# Patient Record
Sex: FEMALE | Race: WHITE | Employment: OTHER | URBAN - METROPOLITAN AREA
[De-identification: names, ages, dates, MRNs, and addresses within clinical notes are randomized per-mention and may not be internally consistent; named-entity substitution may affect disease eponyms.]

---

## 2017-01-02 ENCOUNTER — TELEPHONE (OUTPATIENT)
Dept: FAMILY MEDICINE | Facility: CLINIC | Age: 82
End: 2017-01-02

## 2017-01-02 DIAGNOSIS — K59.09 CONSTIPATION, CHRONIC: Primary | ICD-10-CM

## 2017-01-02 RX ORDER — AMOXICILLIN 250 MG
2 CAPSULE ORAL AT BEDTIME
Qty: 120 TABLET | Refills: 11 | Status: SHIPPED
Start: 2017-01-02 | End: 2018-11-28

## 2017-01-02 NOTE — TELEPHONE ENCOUNTER
Dok Plus 50/8.6mg      Last Written Prescription Date:  10/15/2015  Last Fill Quantity: 100,   # refills: 0  Last Office Visit with [facility], [facility] or [facility] prescribing provider: 12/22/2016  Future Office visit:    Next 5 appointments (look out 90 days)     Jan 05, 2017  9:40 AM   Nurse Only with [facility]   [facility]    [address] [place] [id number] [phone]                   Routing refill request to provider for review/approval because:  Drug not active on patient's medication list    Thank you,    Bravo Hughes CPhT  [facility]  P. [phone]

## 2017-01-05 ENCOUNTER — ALLIED HEALTH/NURSE VISIT (OUTPATIENT)
Dept: NURSING | Facility: CLINIC | Age: 82
End: 2017-01-05
Payer: MEDICARE

## 2017-01-05 ENCOUNTER — CARE COORDINATION (OUTPATIENT)
Dept: CARE COORDINATION | Facility: CLINIC | Age: 82
End: 2017-01-05

## 2017-01-05 DIAGNOSIS — I48.0 PAROXYSMAL ATRIAL FIBRILLATION (H): ICD-10-CM

## 2017-01-05 DIAGNOSIS — R42 DIZZINESS: Primary | ICD-10-CM

## 2017-01-05 PROCEDURE — 99207 ZZC NO CHARGE NURSE ONLY: CPT

## 2017-01-05 PROCEDURE — 93272 ECG/REVIEW INTERPRET ONLY: CPT | Performed by: INTERNAL MEDICINE

## 2017-01-05 PROCEDURE — 93225 XTRNL ECG REC<48 HRS REC: CPT | Performed by: INTERNAL MEDICINE

## 2017-01-05 PROCEDURE — 93227 XTRNL ECG REC<48 HR R&I: CPT | Performed by: INTERNAL MEDICINE

## 2017-01-05 NOTE — PROGRESS NOTES
Per Dr. Lockett, patient came in and had 48 hour of a 48 hour holter put on. Patient instructed to return on 1/9/2017 and have tape and battery changed, no diary was given. Patient was instructed to use the sensor and phone.  Patient to return on 1/9/2017 and have monitor removed.   All questions the patient had has been answered before she was discharged.  Glenna Marquez

## 2017-01-05 NOTE — PROGRESS NOTES
Clinic Care Coordination Contact  D/I: Received call from this patient asking about status of refill for Aldactone. Discussed her regular CC COLLEEN and that another RN CC has been assigned. She stated she had been given my number. Communicated that I would be happy to help her, but that she may be receiving a call from another CC so as not to get confused. Stated she understood this. As we were speaking, she realized that the refill she was looking for needed to go through Dr. Loza. Per chart review, his office has received request. She has Dr. Loza's RN number and will call her. Advised her to call this CC back if having difficulty. Stated she would.  P: Patient will contact Dr. Loza's office. Will notify RN CC of further needs.       RN CC will follow up as scheduled.    John SANDERS,RN- BC  Clinic Care Coordinator  [facility]  Phone: [phone]

## 2017-01-06 ENCOUNTER — INFUSION THERAPY VISIT (OUTPATIENT)
Dept: INFUSION THERAPY | Facility: CLINIC | Age: 82
End: 2017-01-06
Payer: MEDICARE

## 2017-01-06 VITALS
OXYGEN SATURATION: 100 % | SYSTOLIC BLOOD PRESSURE: 126 MMHG | RESPIRATION RATE: 18 BRPM | WEIGHT: 160.6 LBS | HEART RATE: 61 BPM | DIASTOLIC BLOOD PRESSURE: 58 MMHG | BODY MASS INDEX: 28.46 KG/M2 | TEMPERATURE: 96.5 F

## 2017-01-06 DIAGNOSIS — M06.9 RHEUMATOID ARTHRITIS INVOLVING MULTIPLE SITES, UNSPECIFIED RHEUMATOID FACTOR PRESENCE: Primary | ICD-10-CM

## 2017-01-06 PROCEDURE — 96401 CHEMO ANTI-NEOPL SQ/IM: CPT | Performed by: INTERNAL MEDICINE

## 2017-01-06 PROCEDURE — 99207 ZZC NO CHARGE LOS: CPT

## 2017-01-06 NOTE — PROGRESS NOTES
Infusion Nursing Note:  Linda Spicer presents today for Cimzia.    Patient seen by provider today: No    Note: Linda is feeling quite well lately.  She is happy to be feeling this good.      Intravenous Access:  No Intravenous access/labs at this visit.    Treatment Conditions:  N/A    Post Infusion Assessment:  Patient tolerated injection without incident.  Site patent and intact, free from redness, edema or discomfort.    Discharge Plan:   Copy of AVS reviewed with patient and/or family.  Patient will return 2/3/17 for next appointment.  Patient discharged in stable condition accompanied by: daughter.  Departure Mode: Ambulatory.    Isidra Rojas RN

## 2017-01-10 NOTE — NURSING NOTE
Patient returned Holter to the clinic 1/8/2016. Holter was mailed to life watched.  Glenna Marquez

## 2017-01-11 DIAGNOSIS — R42 DIZZINESS: ICD-10-CM

## 2017-01-11 DIAGNOSIS — I48.0 PAROXYSMAL ATRIAL FIBRILLATION (H): ICD-10-CM

## 2017-01-13 ENCOUNTER — CARE COORDINATION (OUTPATIENT)
Dept: CARE COORDINATION | Facility: CLINIC | Age: 82
End: 2017-01-13

## 2017-01-13 ENCOUNTER — TELEPHONE (OUTPATIENT)
Dept: CARDIOLOGY | Facility: CLINIC | Age: 82
End: 2017-01-13

## 2017-01-13 PROBLEM — Z76.89 HEALTH CARE HOME: Status: ACTIVE | Noted: 2017-01-13

## 2017-01-13 NOTE — TELEPHONE ENCOUNTER
Patient called and left a message stating that she sent in her paperwork to [employer]avis Guzman for her medication and she has some questions on it. Please give her a call back. Lizett Arguelles CMA ([facility])

## 2017-01-13 NOTE — TELEPHONE ENCOUNTER
Spoke with patient.  Needs forms filled out to help with financial assistance with the Eliquis. Will email forms to us and she asked if the forms could be sent back to the company when completed.

## 2017-01-13 NOTE — PROGRESS NOTES
"Clinic Care Coordination Contact  Care Team Conversations  D/I: This RN CC received incoming call from patient. She was wondering about her Holter Monitor results. Reviewed chart, and it appears that result has been sent to PCP for review. This RN CC checked report, it appears that nothing abnormal was found. Told patient it appears to be normal but that her PCP or cardiologist office should be contacting her with results. She then brought up an issue she had last night. States she got up in the night and had a strange feeling in the back of her palette. States it felt stretched and she had a choking- like sensation.States it is sore in that area today. States she has not had this before, wondering if normal. Advised that it is not something that most people experience but that she should continue to monitor for this and if she has more of these episodes or if soreness does not go away she should see PCP. Patient will continue to monitor. She states \"I know that if it is not happening at the moment they probably won't know what it is.\"  P: Patient will monitor/report further episodes. Will make MD appt. As needed.            RN CC will continue to follow and assist as needed with CC.    John SANDERS,RN- BC  Clinic Care Coordinator  [facility]  Phone: [phone]                   "

## 2017-01-16 DIAGNOSIS — K21.00 REFLUX ESOPHAGITIS: Primary | ICD-10-CM

## 2017-01-16 NOTE — TELEPHONE ENCOUNTER
omeprazole (PRILOSEC) 20 MG capsule      Last Written Prescription Date: 1/11/16  Last Fill Quantity: 90,  # refills: 3   Last Office Visit with [facility], [facility] or [facility] prescribing provider: 12/22/16

## 2017-01-18 NOTE — TELEPHONE ENCOUNTER
Prescription approved per [facility] Refill Protocol.  ANJELICA Jaramillo, Clinical RN [facility].

## 2017-01-26 ENCOUNTER — CARE COORDINATION (OUTPATIENT)
Dept: CARE COORDINATION | Facility: CLINIC | Age: 82
End: 2017-01-26

## 2017-01-26 NOTE — PROGRESS NOTES
Clinic Care Coordination Contact  Care Team Conversations  D/I: Received call from patient for advice about pain in the roof of her mouth. States it feels like she burned the roof of her mouth but to her knowledge she has not. She had called this RN CC 1 week ago stating that she awoke in the night with a pain/oressure sensation in the back of her mouth/throat area (states it was her palette). I asked if it was that same pain. She said no, that particular pain went away and this is something new. States it started a day or two ago and that it is worse today. She is wondering if a medication could be causing this. Advised this is possible but difficult to know what is causing it without seeing her mouth. States she cannot see it herself as it is toward the back of her mouth. She has no other symptoms. There is nobody with her who can look at moth for redness or white patches. Advise to swish and spit warm salt water for comfort. States she has to be on a low salt diet. Discussed not swallowing, just swishing and spitting. States she understands. She feels like she should be seen because itis getting worse. She plans to check her daughter's availability to bring her in tomorrow. She will have daughter call scheduling line if she plans to come in.  P: Patient will decide if she needs to be seen, Will try warm water swishes for comfort. Will notify RN CC of further needs.       RN CC will F/U with patient as planned.    John SANDERS,RN- BC  Clinic Care Coordinator  [facility]  Phone: [phone]

## 2017-01-27 ENCOUNTER — TELEPHONE (OUTPATIENT)
Dept: FAMILY MEDICINE | Facility: CLINIC | Age: 82
End: 2017-01-27

## 2017-01-27 ENCOUNTER — TELEPHONE (OUTPATIENT)
Dept: NURSING | Facility: CLINIC | Age: 82
End: 2017-01-27

## 2017-01-27 ENCOUNTER — TELEPHONE (OUTPATIENT)
Dept: RHEUMATOLOGY | Facility: CLINIC | Age: 82
End: 2017-01-27

## 2017-01-27 ENCOUNTER — OFFICE VISIT (OUTPATIENT)
Dept: FAMILY MEDICINE | Facility: CLINIC | Age: 82
End: 2017-01-27
Payer: MEDICARE

## 2017-01-27 VITALS
BODY MASS INDEX: 28.17 KG/M2 | HEART RATE: 66 BPM | DIASTOLIC BLOOD PRESSURE: 58 MMHG | SYSTOLIC BLOOD PRESSURE: 118 MMHG | OXYGEN SATURATION: 99 % | TEMPERATURE: 98.8 F | WEIGHT: 159 LBS | HEIGHT: 63 IN

## 2017-01-27 DIAGNOSIS — Z86.79 H/O ATRIAL FLUTTER: Primary | ICD-10-CM

## 2017-01-27 DIAGNOSIS — K12.0 ORAL APHTHAE: Primary | ICD-10-CM

## 2017-01-27 DIAGNOSIS — Z29.89 NEED FOR PROPHYLAXIS AGAINST URINARY TRACT INFECTION: ICD-10-CM

## 2017-01-27 DIAGNOSIS — I48.92 ATRIAL FLUTTER, UNSPECIFIED TYPE (H): ICD-10-CM

## 2017-01-27 DIAGNOSIS — Z87.440 PERSONAL HISTORY OF URINARY TRACT INFECTION: ICD-10-CM

## 2017-01-27 PROCEDURE — 99214 OFFICE O/P EST MOD 30 MIN: CPT | Performed by: INTERNAL MEDICINE

## 2017-01-27 RX ORDER — CHLORHEXIDINE GLUCONATE ORAL RINSE 1.2 MG/ML
15 SOLUTION DENTAL DAILY
COMMUNITY
End: 2017-02-16

## 2017-01-27 RX ORDER — FOLIC ACID 1 MG/1
1 TABLET ORAL DAILY
Qty: 100 TABLET | Refills: 3 | Status: SHIPPED | OUTPATIENT
Start: 2017-01-27 | End: 2017-05-12

## 2017-01-27 RX ORDER — CIPROFLOXACIN 250 MG/1
250 TABLET, FILM COATED ORAL DAILY
Qty: 90 TABLET | Refills: 3 | Status: SHIPPED | OUTPATIENT
Start: 2017-01-27 | End: 2017-02-16

## 2017-01-27 RX ORDER — CHLORHEXIDINE GLUCONATE ORAL RINSE 1.2 MG/ML
15 SOLUTION DENTAL DAILY
Qty: 473 ML | Refills: 11 | Status: SHIPPED | OUTPATIENT
Start: 2017-01-27 | End: 2017-02-16

## 2017-01-27 NOTE — MR AVS SNAPSHOT
After Visit Summary   1/27/2017    Linda Spicer    MRN: [id number]           Patient Information     Date Of Birth          6/13/1931        Visit Information        Provider Department      1/27/2017 8:00 AM Tre Lockett MD [facility]        Today's Diagnoses     Oral aphthae    -  1     Atrial flutter, unspecified type (H)         Personal history of urinary tract infection         Need for prophylaxis against urinary tract infection           Care Instructions    This summary includes the important diagnoses, test, medications and other important parts of your medical history.  Below are a few good we sites you can use to learn more about these.     Www.[place]a Village.OpenROV : Up to date and easily searchable information on multiple topics.  Www.Tribal Nova/Pharmacy/c_539084.asp : [facility] $4.99 medications  Www.Critical Media.gov : medication info, interactive tutorials, watch real surgeries online  Www.familydoctor.org : good info from the Academy of Family Physicians  Www.[employer].PROV[employer]US PH[employer]ACE[employer]ICALS : good info from the [facility]  Www.cdc.gov : public health info, travel advisories, epidemics (H1N1)  Www.aap.org : children's health info, normal development, vaccinations  Www.health.[facility].[place].us : [place] dept of heatlh, public health issues in [place], N1N1    Based on your medical history and these are the current health maintenance or preventive care services that you are due for (some may have been done at this visit:)  There are no preventive care reminders to display for this patient.  =================================================================================  Normal Values   Blood pressure  <140/90 for most adults    <130/80 for some chronic diseases (ask your care team about yours)    BMI (body mass index)  18.5-25 kg/m2 (based on height and weight)     Thank you for visiting [facility]    Normal or non-critical lab and imaging results will be  communicated to you by MyChart, letter or phone within 7 days.  If you do not hear from us within 10 days, please call the clinic. If you have a critical or abnormal lab result, we will notify you by phone as soon as possible.     If you have any questions regarding your visit please contact:     Team Comfort:   Clinic Hours Telephone Number   Dr. Aravind Novak   7am-5pm  Monday - Friday [phone]  Viktor RN   Pharmacy 8am-8pm Monday-Thursday      8am-6pm Friday  9am-5pm Saturday-Sunday [phone]   Urgent Care 11am-8pm Monday-Friday        9am-5pm Saturday-Sunday [phone]     After hours, weekend or if you need to make an appointment with your primary provider please call [phone].   After Hours nurse advise: call [facility]: [phone]    Medication Refills:  Call your pharmacy and they will forward the refill to us. Please allow 3 business days for your refills to be completed.    Use Global [employer] (secure email communication and access to your chart) to send your primary care provider a message or make an appointment. Ask someone on your Team how to sign up for [employer]. To log on to Garena or for more information in TheTakes please visit the website at [email or web address].  As of October 8, 2013, all password changes, disabled accounts, or ID changes in [employer]/MyHealth will be done by our Access Services Department.   If you need help with your account or password, call: [phone]. Clinic staff no longer has the ability to change passwords.           Follow-ups after your visit        Your next 10 appointments already scheduled     Feb 03, 2017  9:30 AM   Level O with [place] [facility] ([facility])    [address] [place] [id number] [phone]            Mar 03, 2017  9:30 AM   Level O with [address]  "([email or web address]    [address] [place] [id number] [phone]            Apr 18, 2017  9:20 AM   Return Visit with Mario Davenport MD   [facility] ([facility])    [address] [facility] [address]   [phone]            May 15, 2017 12:30 PM   Return Visit with Emeterio Loza MD   [email or web address] ([email or web address]    [address] [place] [id number] [phone]              Who to contact     If you have questions or need follow up information about today's clinic visit or your schedule please contact [facility] directly at [phone].  Normal or non-critical lab and imaging results will be communicated to you by MyChart, letter or phone within 4 business days after the clinic has received the results. If you do not hear from us within 7 days, please contact the clinic through ThirdPresencehart or phone. If you have a critical or abnormal lab result, we will notify you by phone as soon as possible.  Submit refill requests through MassHousing or call your pharmacy and they will forward the refill request to us. Please allow 3 business days for your refill to be completed.          Additional Information About Your Visit        MyChart Information     [employer] gives you secure access to your electronic health record. If you see a primary care provider, you can also send messages to your care team and make appointments. If you have questions, please call your primary care clinic.  If you do not have a primary care provider, please call [phone] and they will assist you.        Care EveryWhere ID     This is your Care EveryWhere ID. This could be used by other organizations to access your [facility] medical records  [id number]        Your Vitals Were     Pulse Temperature Height BMI (Body Mass Index) Pulse Oximetry       66 98.8  F (37.1  C) (Oral) 5' 3\" (1.6 m) 28.17 " kg/m2 99%        Blood Pressure from Last 3 Encounters:   01/27/17 118/58   01/06/17 126/58   12/22/16 138/70    Weight from Last 3 Encounters:   01/27/17 159 lb (72.122 kg)   01/06/17 160 lb 9.6 oz (72.848 kg)   12/22/16 158 lb (71.668 kg)              Today, you had the following     No orders found for display         Today's Medication Changes          These changes are accurate as of: 1/27/17  8:58 AM.  If you have any questions, ask your nurse or doctor.               Start taking these medicines.        Dose/Directions    folic acid 1 MG tablet   Commonly known as:  FOLVITE   Used for:  Oral aphthae   Started by:  Tre Lockett MD        Dose:  1 mg   Take 1 tablet (1 mg) by mouth daily   Quantity:  100 tablet   Refills:  3         These medicines have changed or have updated prescriptions.        Dose/Directions    ciprofloxacin 250 MG tablet   Commonly known as:  CIPRO   This may have changed:  additional instructions   Used for:  Need for prophylaxis against urinary tract infection, Personal history of urinary tract infection        Dose:  250 mg   Take 1 tablet (250 mg) by mouth daily   Quantity:  90 tablet   Refills:  3            Where to get your medicines      [place] [facility] [place] [address]  [address], [facility] [address]     Phone:  [phone]    - ciprofloxacin 250 MG tablet  - folic acid 1 MG tablet             Primary Care Provider Office Phone # Fax #    Tre Lockett [phone]       [facility] [address]  [facility] [address]        Thank you!     Thank you for choosing [facility]  for your care. Our goal is always to provide you with excellent care. Hearing back from our patients is one way we can continue to improve our services. Please take a few minutes to complete the written survey that you may receive in the mail after your  visit with us. Thank you!             Your Updated Medication List - Protect others around you: Learn how to safely use, store and throw away your medicines at www.disposemymeds.org.          This list is accurate as of: 1/27/17  8:58 AM.  Always use your most recent med list.                   Brand Name Dispense Instructions for use    albuterol (2.5 MG/3ML) 0.083% neb solution     50 vial    Take 1 vial (2.5 mg) by nebulization every 6 hours as needed for shortness of breath / dyspnea or wheezing       apixaban ANTICOAGULANT 2.5 MG tablet    ELIQUIS    60 tablet    Take 1 tablet (2.5 mg) by mouth 2 times daily       bismuth subsalicylate 262 MG chewable tablet    PEPTO BISMOL     Take 524 mg by mouth 4 times daily (before meals and nightly)       Blood Pressure Monitor Kit     1 kit    1 kit daily as needed       calcium-vitamin D 600-400 MG-UNIT per tablet    CALTRATE     Take 1 tablet by mouth 2 times daily       Carboxymethylcellulose Sodium 1 % Gel      1-2 drops (aka Genteal)       certolizumab pegol 2 X 200 MG/ML Kit 2 syringes/kit    CIMZIA     Inject 1 Syringe Subcutaneous       ciprofloxacin 250 MG tablet    CIPRO    90 tablet    Take 1 tablet (250 mg) by mouth daily       Cranberry 180 MG Caps      Take 2 capsules by mouth daily Unsure of strength       fish oil-omega-3 fatty acids 1000 MG capsule      Take 2 g by mouth daily. 2 capsules daily       fluticasone 50 MCG/ACT spray    FLONASE    16 g    Spray 2 sprays into both nostrils daily       folic acid 1 MG tablet    FOLVITE    100 tablet    Take 1 tablet (1 mg) by mouth daily       GENTEAL MILD OP      Apply  to eye daily.       IBANdronate 150 MG tablet    BONIVA    3 tablet    Take 1 tablet (150 mg) by mouth every 30 days       ipratropium 17 MCG/ACT Inhaler    ATROVENT HFA    1 Inhaler    Inhale 2 puffs into the lungs 4 times daily Use with Albuterol inhaler.       levothyroxine 75 MCG tablet    SYNTHROID/LEVOTHROID    90 tablet    Take 1 tablet  (75 mcg) by mouth daily       losartan 100 MG tablet    COZAAR    90 tablet    TAKE ONE TABLET BY MOUTH EVERY DAY FOR BLOOD PRESSURE       LUCENTIS IO      1 injection every 4 Weeks       metoprolol 25 MG 24 hr tablet    TOPROL-XL    45 tablet    Take 0.5 tablets (12.5 mg) by mouth daily       nitroglycerin 0.4 MG sublingual tablet    NITROSTAT    25 tablet    Place 1 tablet (0.4 mg) under the tongue every 5 minutes as needed for chest pain       omeprazole 20 MG CR capsule    priLOSEC    90 capsule    TAKE ONE CAPSULE BY MOUTH 30 MINUTES BEFORE BREAKFAST. DO NOT TAKE WITH LEVOTHYROXINE       * order for DME     1 Box    Equipment being ordered: Dispense face mask. Mrs. Spicer is immunosuppressed due to rheumatoid arthritis.       * order for DME     1 each    Equipment being ordered: Nebulizer. Use with Albuterol.       order for DME     1 each    Equipment being ordered: Dispense baffle, for use with nebulizer.       * order for DME     1 each    Equipment being ordered: Left wrist splint.       * order for DME     1 each    Equipment being ordered: Left wrist splint.       PRESERVISION AREDS PO      Take 2 tablets by mouth daily       REFRESH P.M. Oint      Apply  to eye. Daily at bedtime       saccharomyces boulardii 250 MG capsule    FLORASTOR     Take 250 mg by mouth       senna-docusate 8.6-50 MG per tablet    SENOKOT-S;PERICOLACE    120 tablet    Take 2 tablets by mouth At Bedtime Hold if diarrhea occurs.       spironolactone 25 MG tablet    ALDACTONE    90 tablet    Take 1 tablet (25 mg) by mouth daily       triamcinolone 0.1 % cream    KENALOG    453.6 g    Apply sparingly to affected area on face three times daily.       ZYRTEC ALLERGY 10 MG tablet   Generic drug:  cetirizine      Take 10 mg by mouth At Bedtime       * Notice:  This list has 4 medication(s) that are the same as other medications prescribed for you. Read the directions carefully, and ask your doctor or other care provider to review them  with you.

## 2017-01-27 NOTE — NURSING NOTE
"Chief Complaint   Patient presents with     Mouth Problem     sores in mouth x 3 days       Initial /68 mmHg  Pulse 66  Temp(Src) 98.8  F (37.1  C) (Oral)  Ht 5' 3\" (1.6 m)  Wt 159 lb (72.122 kg)  BMI 28.17 kg/m2  SpO2 99% Estimated body mass index is 28.17 kg/(m^2) as calculated from the following:    Height as of this encounter: 5' 3\" (1.6 m).    Weight as of this encounter: 159 lb (72.122 kg).  BP completed using cuff size: regular  Jorge Forrester MA      "

## 2017-01-27 NOTE — TELEPHONE ENCOUNTER
Left detailed message for patient on voice mail to follow-up with rheumatologist.  Usama Ching CMA

## 2017-01-27 NOTE — PATIENT INSTRUCTIONS
This summary includes the important diagnoses, test, medications and other important parts of your medical history.  Below are a few good we sites you can use to learn more about these.     Www.ResearchGate.org : Up to date and easily searchable information on multiple topics.  Www.ResearchGate.org/Pharmacy/c_539084.asp : [place] [facility] $4.99 medications  Www.medlineplus.gov : medication info, interactive tutorials, watch real surgeries online  Www.familydoctor.org : good info from the Academy of Family Physicians  Www.[facility]oPeacock [facility]adeinic.com : good info from the [facility]  Www.cdc.gov : public health info, travel advisories, epidemics (H1N1)  Www.aap.org : children's health info, normal development, vaccinations  Www.health.[facility].[place].us : [place] dept of heat, public health issues in [place], N1N1    Based on your medical history and these are the current health maintenance or preventive care services that you are due for (some may have been done at this visit:)  There are no preventive care reminders to display for this patient.  =================================================================================  Normal Values   Blood pressure  <140/90 for most adults    <130/80 for some chronic diseases (ask your care team about yours)    BMI (body mass index)  18.5-25 kg/m2 (based on height and weight)     Thank you for visiting [facility]    Normal or non-critical lab and imaging results will be communicated to you by MyChart, letter or phone within 7 days.  If you do not hear from us within 10 days, please call the clinic. If you have a critical or abnormal lab result, we will notify you by phone as soon as possible.     If you have any questions regarding your visit please contact:     Team Comfort:   Clinic Hours Telephone Number   Dr. Aravind Novak   7am-5pm  Monday - Friday [phone]  Viktor PAZ   Pharmacy 8am-8pm  Monday-Thursday      8am-6pm Friday  9am-5pm Saturday-Sunday [phone]   Urgent Care 11am-8pm Monday-Friday        9am-5pm Saturday-Sunday [phone]     After hours, weekend or if you need to make an appointment with your primary provider please call [phone].   After Hours nurse advise: call [facility]: [phone]    Medication Refills:  Call your pharmacy and they will forward the refill to us. Please allow 3 business days for your refills to be completed.    Use Arisoko (secure email communication and access to your chart) to send your primary care provider a message or make an appointment. Ask someone on your Team how to sign up for Arisoko. To log on to Ringerscommunications or for more information in [place] please visit the website at [email or web address].  As of October 8, 2013, all password changes, disabled accounts, or ID changes in Arisoko/MyHealth will be done by our Access Services Department.   If you need help with your account or password, call: [phone]. Clinic staff no longer has the ability to change passwords.

## 2017-01-27 NOTE — TELEPHONE ENCOUNTER
Status: Signed         Expand All Collapse All    Reason for Call:  Other prescription    Detailed comments: Pt would like a change  in her arthitrits Rx to be changed form April to now for Pt is startin to experience sores in her mouth.  She would like to discus this change further with Dr. Davenport    Phone Number Patient can be reached at: Home number on file [phone] (home)    Best Time: Anytime    Can we leave a detailed message on this number? YES    Call taken on 1/27/2017 at 4:27 PM by Maribel Crockett

## 2017-01-27 NOTE — TELEPHONE ENCOUNTER
Patient calling about paperwork for [employer] Patient Assistance [facility]. E-mail that she had sent was to have an attachment but did not. Unable to get through with phone number she provided. Did find a different phone contact number on the website and they were able to direct me to the forms. Will have Dr Loza fill out provider form and sign a prescription which will be faxed directly to the [facility]. Patient aware.      Faxed refill request.   Medication requested: Eliquis  Pharmacy Requested: Script for patient assistance [facility]   Pt's last office visit: 11/14/16  Next scheduled office visit: 5/15/17  Component      Latest Ref Rng 10/18/2016 12/22/2016   WBC      4.0 - 11.0 10e9/L 7.9    RBC Count      3.8 - 5.2 10e12/L 3.56 (L)    Hemoglobin      11.7 - 15.7 g/dL 11.5 (L)    Hematocrit      35.0 - 47.0 % 34.4 (L)    MCV      78 - 100 fl 97    MCH      26.5 - 33.0 pg 32.3    MCHC      31.5 - 36.5 g/dL 33.4    RDW      10.0 - 15.0 % 13.0    Platelet Count      150 - 450 10e9/L 279    Diff Method       Automated Method    % Neutrophils       56.9    % Lymphocytes       30.6    % Monocytes       9.5    % Eosinophils       2.5    % Basophils       0.5    Absolute Neutrophil      1.6 - 8.3 10e9/L 4.5    Absolute Lymphocytes      0.8 - 5.3 10e9/L 2.4    Absolute Monocytes      0.0 - 1.3 10e9/L 0.8    Absolute Eosinophils      0.0 - 0.7 10e9/L 0.2    Absolute Basophils      0.0 - 0.2 10e9/L 0.0    Sodium      133 - 144 mmol/L  139   Potassium      3.4 - 5.3 mmol/L  4.8   Chloride      94 - 109 mmol/L  105   Carbon Dioxide      20 - 32 mmol/L  26   Anion Gap      3 - 14 mmol/L  8   Glucose      70 - 99 mg/dL  146 (H)   Urea Nitrogen      7 - 30 mg/dL  37 (H)   Creatinine      0.52 - 1.04 mg/dL  1.22 (H)   GFR Estimate      >60 mL/min/1.7m2  42 (L)   GFR Estimate If Black      >60 mL/min/1.7m2  51 (L)   Calcium      8.5 - 10.1 mg/dL  9.2   Bilirubin Total      0.2 - 1.3 mg/dL  0.4   Albumin      3.4  - 5.0 g/dL  3.9   Protein Total      6.8 - 8.8 g/dL  8.1   Alkaline Phosphatase      40 - 150 U/L  66   ALT      0 - 50 U/L  31   AST      0 - 45 U/L  22       Refill authorized per protocol  Stacey Perez RN  Cardiology Care Coordinator  [facility]  Phone: [phone]

## 2017-01-27 NOTE — TELEPHONE ENCOUNTER
Reason for Call:  Other prescription    Detailed comments: Pt would like a change  in her arthitrits Rx to be changed form April to now for Pt is startin to experience sores in her mouth.  She would like to discus this change further with Dr. Lockett    Phone Number Patient can be reached at: Home number on file [phone] (home)    Best Time: Anytime    Can we leave a detailed message on this number? YES    Call taken on 1/27/2017 at 10:13 AM by Siva Swanson

## 2017-01-27 NOTE — PROGRESS NOTES
SUBJECTIVE:                                                    Linda Spicer is a 85 year old female who presents to clinic today for the following health issues:    Concern - sores     Onset: 3 days     Description:   Sores in the mouth     Intensity: mild    Progression of Symptoms:  worsening    Accompanying Signs & Symptoms:  Painful and tender to the touch       Previous history of similar problem:       Precipitating factors:   Worsened by:     Alleviating factors:  Improved by:        Therapies Tried and outcome: none       UTI follow-up      Duration: chronic    Description (location/character/radiation): recurring bacteriuria and pyuria that resolved with multiple oral antibiotics and with prophylactic daily doses of Cipro 250 mg    Culture last 5/23/16 shows Citrobacter freundii    Accompanying signs and symptoms: no pyelonephritis    History (similar episodes/previous evaluation): Yes. Urinalysis last month is normal.    Precipitating or alleviating factors: immunosuppression from Cimzia          Problem list and histories reviewed & adjusted, as indicated.  Additional history: as documented    Patient Active Problem List   Diagnosis     Advanced directives, counseling/discussion     Hypertension goal BP (blood pressure) < 140/90     Hypothyroid     Diffuse idiopathic pulmonary fibrosis (H)     Macular degeneration, left eye     Irritable bowel syndrome     Encounter for palliative care     Adjustment disorder with anxious mood     Mild anemia     DDD (degenerative disc disease), lumbar     CKD (chronic kidney disease) stage 3, GFR 30-59 ml/min     History of blood transfusion     Aftercare following surgery     S/P lumbar laminectomy     High risk medication use     Osteopenia     Atrophic vaginitis     Fecal incontinence     Female stress incontinence     Impingement syndrome of both shoulders     UIP (usual interstitial pneumonitis) (H)     High risk medications (not anticoagulants) long-term use      Heart failure with preserved ejection fraction (H)     Congestive heart failure with preserved LV function, NYHA class 3 (H)     Other specified hypothyroidism     Rheumatoid arthritis involving multiple sites with positive rheumatoid factor (H)     Health Care Home     Past Surgical History   Procedure Laterality Date     Biopsy       hemorrhoidectomy     Ent surgery       tonsillectomy     Appendectomy       Hysterectomy, pap no longer indicated       Gyn surgery       3 D & C's     Laminectomy lumbar one level N/A 10/13/2015     Procedure: LAMINECTOMY LUMBAR ONE LEVEL;  Surgeon: Fransico Toussaint MD;  Location:  OR       Social History   Substance Use Topics     Smoking status: Never Smoker      Smokeless tobacco: Never Used     Alcohol Use: Yes      Comment: rare wine      Family History   Problem Relation Age of Onset     Hypertension Mother      Psychotic Disorder Father      DIABETES Son      DIABETES Daughter      Blood Disease Daughter          Allergies   Allergen Reactions     Cephalexin Hcl Diarrhea     Gabapentin Other (See Comments)     Dizzsiness     Naproxen GI Disturbance     Perfume      Lactase Other (See Comments)     Macrobid [Nitrofurantoin Anhydrous]      Possibly related to lung disease      Sulfa Drugs      Throat swelling     Xanax [Alprazolam] Other (See Comments)     Dizziness      Recent Labs   Lab Test  12/22/16   1506  10/18/16   1000  08/18/16   1002  08/01/16   0845   06/03/16   0756   05/22/14   0821   LDL   --    --    --    --    --   109*   --   97   HDL   --    --    --    --    --   47*   --   42*   TRIG   --    --    --    --    --   75   --   84   ALT  31  30   --    --    --   36   < >   --    CR  1.22*  1.31*  1.22*   --    < >  1.44*   < >  1.21*   GFRESTIMATED  42*  39*  42*   --    < >  35*   < >  43*   GFRESTBLACK  51*  47*  51*   --    < >  42*   < >  52*   POTASSIUM  4.8   --   4.4   --    < >  5.1   < >  4.1   TSH  0.83   --    --   0.64   --    --    < >   "0.58    < > = values in this interval not displayed.      BP Readings from Last 3 Encounters:   01/27/17 118/58   01/06/17 126/58   12/22/16 138/70    Wt Readings from Last 3 Encounters:   01/27/17 159 lb (72.122 kg)   01/06/17 160 lb 9.6 oz (72.848 kg)   12/22/16 158 lb (71.668 kg)                  Labs reviewed in EPIC  Problem list, Medication list, Allergies, and Medical/Social/Surgical histories reviewed in [facility] and updated as appropriate.    ROS:  C: NEGATIVE for fever, chills, change in weight  INTEGUMENTARY/SKIN: As above.  E: NEGATIVE for vision changes or irritation  E/M: NEGATIVE for ear, mouth and throat problems  R: NEGATIVE for significant cough or SOB  CV: POSITIVE for atrial flutter. Patient is taking novel anticoagulant (Eliquis) without any side effects.  GI: NEGATIVE for nausea, abdominal pain, heartburn, or change in bowel habits  : NEGATIVE for frequency, dysuria, or hematuria. POSITIVE for CHRONIC KIDNEY DISEASE stage 3, GFR of 42 last 12/22/2016.  M: NEGATIVE for significant arthralgias or myalgia  N: NEGATIVE for weakness, dizziness or paresthesias  E: NEGATIVE for temperature intolerance, skin/hair changes  H: NEGATIVE for bleeding problems  P: NEGATIVE for changes in mood or affect    OBJECTIVE:                                                    /58 mmHg  Pulse 66  Temp(Src) 98.8  F (37.1  C) (Oral)  Ht 5' 3\" (1.6 m)  Wt 159 lb (72.122 kg)  BMI 28.17 kg/m2  SpO2 99%  Body mass index is 28.17 kg/(m^2).  GENERAL: healthy, alert and no distress  EYES: Eyes grossly normal to inspection  HENT: normal cephalic/atraumatic  RESP: lungs clear to auscultation - no rales, rhonchi or wheezes  CV: regular rate and rhythm, normal S1 S2, no S3 or S4, no murmur, click or rub, no peripheral edema and peripheral pulses strong  ABDOMEN: soft, nontender, no hepatosplenomegaly, no masses and bowel sounds normal   (female): deferred  NEURO: Normal strength and tone, mentation intact and speech " normal  PSYCH: mentation appears normal, affect normal/bright    Diagnostic Test Results:  Results for orders placed or performed in visit on 12/22/16   *UA reflex to Microscopic   Result Value Ref Range    Color Urine Yellow     Appearance Urine Clear     Glucose Urine Negative NEG mg/dL    Bilirubin Urine Negative NEG    Ketones Urine Negative NEG mg/dL    Specific Gravity Urine 1.010 1.003 - 1.035    Blood Urine Negative NEG    pH Urine 6.0 5.0 - 7.0 pH    Protein Albumin Urine Negative NEG mg/dL    Urobilinogen Urine 0.2 0.2 - 1.0 EU/dL    Nitrite Urine Negative NEG    Leukocyte Esterase Urine Negative NEG    Source Midstream Urine    Comprehensive metabolic panel (BMP + Alb, Alk Phos, ALT, AST, Total. Bili, TP)   Result Value Ref Range    Sodium 139 133 - 144 mmol/L    Potassium 4.8 3.4 - 5.3 mmol/L    Chloride 105 94 - 109 mmol/L    Carbon Dioxide 26 20 - 32 mmol/L    Anion Gap 8 3 - 14 mmol/L    Glucose 146 (H) 70 - 99 mg/dL    Urea Nitrogen 37 (H) 7 - 30 mg/dL    Creatinine 1.22 (H) 0.52 - 1.04 mg/dL    GFR Estimate 42 (L) >60 mL/min/1.7m2    GFR Estimate If Black 51 (L) >60 mL/min/1.7m2    Calcium 9.2 8.5 - 10.1 mg/dL    Bilirubin Total 0.4 0.2 - 1.3 mg/dL    Albumin 3.9 3.4 - 5.0 g/dL    Protein Total 8.1 6.8 - 8.8 g/dL    Alkaline Phosphatase 66 40 - 150 U/L    ALT 31 0 - 50 U/L    AST 22 0 - 45 U/L   TSH with free T4 reflex   Result Value Ref Range    TSH 0.83 0.40 - 4.00 mU/L   Ferritin   Result Value Ref Range    Ferritin 42 8 - 252 ng/mL        ASSESSMENT/PLAN:                                                            (K12.0) Oral aphthae  (primary encounter diagnosis)  Comment: Most likely secondary to use of immunosuppressants.  Plan: folic acid (FOLVITE) 1 MG tablet, chlorhexidine        (PERIDEX) 0.12 % solution            (I48.92) Atrial flutter, unspecified type (H)  Comment:Ventricular rate well-controlled with Toprol XL 12.5 mg once daily.  Plan: Continue Eliquis, adjusted according to GFR  (2.5 mg BID).    (Z87.440) Personal history of urinary tract infection  Comment: Asymptomatic since taking Ciprofloxacin  Plan: ciprofloxacin (CIPRO) 250 MG tablet            (Z41.8) Need for prophylaxis against urinary tract infection  Comment: Necessary due to recurrent episodes of UTI.  Plan: ciprofloxacin (CIPRO) 250 MG tablet              FUTURE APPOINTMENTS:       - Follow-up visit if aphthous ulcer are worse.    Tre Lockett MD  [facility]

## 2017-01-31 NOTE — TELEPHONE ENCOUNTER
Called and spoke to patient.  Patient recently was seen by PCP for mouth sores.  Patient states was last seen in clinic 10/2016.  During that appointment provider discussed changing medication.  Patient has an infusion scheduled for this week and would like to discuss the change before her next scheduled infusion in March.  Patient will have daughter call to make an appointment to discuss change in medication.  Sharon Chiang RN

## 2017-02-03 ENCOUNTER — INFUSION THERAPY VISIT (OUTPATIENT)
Dept: INFUSION THERAPY | Facility: CLINIC | Age: 82
End: 2017-02-03
Payer: MEDICARE

## 2017-02-03 VITALS
OXYGEN SATURATION: 98 % | SYSTOLIC BLOOD PRESSURE: 138 MMHG | WEIGHT: 159.6 LBS | BODY MASS INDEX: 28.28 KG/M2 | TEMPERATURE: 97.3 F | RESPIRATION RATE: 16 BRPM | HEART RATE: 68 BPM | DIASTOLIC BLOOD PRESSURE: 75 MMHG

## 2017-02-03 DIAGNOSIS — M06.9 RHEUMATOID ARTHRITIS INVOLVING MULTIPLE SITES, UNSPECIFIED RHEUMATOID FACTOR PRESENCE: Primary | ICD-10-CM

## 2017-02-03 PROCEDURE — 96401 CHEMO ANTI-NEOPL SQ/IM: CPT | Performed by: INTERNAL MEDICINE

## 2017-02-03 PROCEDURE — 99207 ZZC NO CHARGE LOS: CPT

## 2017-02-03 NOTE — PROGRESS NOTES
Infusion Nursing Note:  Linda Spicer presents today for Cimzia.    Patient seen by provider today: No   present during visit today: Not Applicable.    Note: N/A.    Intravenous Access:  No Intravenous access/labs at this visit.    Treatment Conditions:  Not Applicable.      Post Infusion Assessment:  Patient tolerated injection without incident; 2 injections given in each side of the lower abdomen    Discharge Plan:   Discharge instructions reviewed with: Patient.  Copy of AVS reviewed with patient and/or family.  Patient will return 2/21 with [place] and 3/3 for Cimzia (pt will call and notify us if plan of care changes)_ for next appointment.  Patient discharged in stable condition accompanied by: self.  Departure Mode: Ambulatory.    Charisma Lazo RN

## 2017-02-05 ENCOUNTER — TELEPHONE (OUTPATIENT)
Dept: NURSING | Facility: CLINIC | Age: 82
End: 2017-02-05

## 2017-02-05 ENCOUNTER — TRANSFERRED RECORDS (OUTPATIENT)
Dept: HEALTH INFORMATION MANAGEMENT | Facility: CLINIC | Age: 82
End: 2017-02-05

## 2017-02-05 NOTE — TELEPHONE ENCOUNTER
"Call Type: Triage Call    Presenting Problem: Caller reporting \"pulse is over 100.\" Patient  labored with her breathing. Heart rate rechecked during triage at  130 bpm.  Triage Note:  Guideline Title: Irregular Heartbeat  Recommended Disposition: Activate   Original Inclination: Did not know what to do  Override Disposition:  Intended Action: Follow advice given  Physician Contacted: No  Currently having palpitations/irregular heartbeats AND severe breathing problems ?  YES  Loss of consciousness for any period of time ? NO  New or worsening signs and symptoms that may indicate shock ? NO  Any other cardiac signs/symptoms for more than 5 minutes, now or within last hour.  Pain is NOT associated with taking a deep breath or a productive cough, movement,  or touch to a localized area on the chest or upper body. ? NO  Physician Instructions:  Care Advice: Do not give the patient anything to eat or drink.  "

## 2017-02-07 ENCOUNTER — TELEPHONE (OUTPATIENT)
Dept: NURSING | Facility: CLINIC | Age: 82
End: 2017-02-07

## 2017-02-07 NOTE — TELEPHONE ENCOUNTER
Patient placement just called me with phone call from [facility].     ADDENDUM (2/6/17): Patient hospitalized at [facility] a week ago with syncope and bradycardia and her beta blocker was stopped.  She returned to [facility] today with recurrent symptoms and was noted to have bursts of AF. 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       Emeterio Loza MD

## 2017-02-13 ENCOUNTER — TRANSFERRED RECORDS (OUTPATIENT)
Dept: HEALTH INFORMATION MANAGEMENT | Facility: CLINIC | Age: 82
End: 2017-02-13

## 2017-02-13 ENCOUNTER — TELEPHONE (OUTPATIENT)
Dept: FAMILY MEDICINE | Facility: CLINIC | Age: 82
End: 2017-02-13

## 2017-02-13 ENCOUNTER — TELEPHONE (OUTPATIENT)
Dept: CARDIOLOGY | Facility: CLINIC | Age: 82
End: 2017-02-13

## 2017-02-13 ENCOUNTER — ALLIED HEALTH/NURSE VISIT (OUTPATIENT)
Dept: NURSING | Facility: CLINIC | Age: 82
End: 2017-02-13
Payer: MEDICARE

## 2017-02-13 VITALS
HEART RATE: 74 BPM | SYSTOLIC BLOOD PRESSURE: 185 MMHG | OXYGEN SATURATION: 98 % | RESPIRATION RATE: 20 BRPM | DIASTOLIC BLOOD PRESSURE: 78 MMHG

## 2017-02-13 DIAGNOSIS — I10 HYPERTENSION GOAL BP (BLOOD PRESSURE) < 140/90: Primary | ICD-10-CM

## 2017-02-13 PROCEDURE — 99207 ZZC NO CHARGE NURSE ONLY: CPT

## 2017-02-13 NOTE — TELEPHONE ENCOUNTER
Reason for call: Same Day Appointment   Requested Provider: vocal      Reason for visit: feeling she needs a med change    Duration of symptoms: n/a    Have you been treated for this in the past? Yes    Additional comments: please win pt if you could work her in tomorrow  Or if your nurse can call pt to assess how she is feeling. Declined triage  But would like a call back as soon as you can      Phone Number Pt can be reached at: Home number on file [phone] (home)  Best Time: any  Can we leave a detailed message on this number? YES

## 2017-02-13 NOTE — TELEPHONE ENCOUNTER
Patient had pacemaker placed last week.  For the past 2 weeks the patient has felt like she is dragging.  Patient was hoping that the new pace maker would fix problems.  Patient has called pacemaker company multiple times and was told she had an episode last night, but it has resolved. Patient reports fatigue and shortness of breath; however, patient says the shortness of breath is not worse than normal.  Patient states her symptoms are worsening. Patient denies any dizziness, lightheadedness, or chest pain.  Patient did report some nasal congestion.  Patient checked vitals at home.  BP was 117/49, Pulse 76, Oxygen 95.    Patient has an appointment with Cardiologist March 6th.    Patient is requesting an appointment tomorrow with provider.  Due to the fatigue and shortness of breath, RN advised that she be seen today in ER.  Patient doesn't want to go to the ER.  RN explained concerns.  RN advised at the very least come to Urgent Care.  There is a chance that they will send you to the ER, but she should be evaluated in ER or Urgent care tonight. Patient will think about it.  Patient will call the clinic if she doesn't need appointment tomorrow.    Dayanara Luna RN

## 2017-02-13 NOTE — TELEPHONE ENCOUNTER
Patient calling to make a follow up appt with Dr Loza. Had pacemaker placed at [facility] on 2/6/17. Wound check by device nurse planned for next week. She would like to see the device nurse here in the future and this can be set up when she comes in to see Dr Loza. Appt made for 3/6/17.

## 2017-02-13 NOTE — MR AVS SNAPSHOT
After Visit Summary   2/13/2017    Linda Spicer    MRN: [id number]           Patient Information     Date Of Birth          6/13/1931        Visit Information        Provider Department      2/13/2017 3:00 PM BK RN [facility]        Today's Diagnoses     Hypertension goal BP (blood pressure) < 140/90    -  1       Follow-ups after your visit        Your next 10 appointments already scheduled     Feb 14, 2017  3:00 PM CST   Office Visit with Tre Lockett MD   [facility]    [address] [address]   [phone]           Bring a current list of meds and any records pertaining to this visit.  For Physicals, please bring immunization records and any forms needing to be filled out.  Please arrive 10 minutes early to complete paperwork.            Feb 16, 2017  9:00 AM CST   Office Visit with Tre Lockett MD   [facility]    [address] [address]   [phone]           Bring a current list of meds and any records pertaining to this visit.  For Physicals, please bring immunization records and any forms needing to be filled out.  Please arrive 10 minutes early to complete paperwork.            Feb 21, 2017 10:40 AM CST   Return Visit with Mario Davenport MD   [facility]    [address] [address]   [phone]            Mar 03, 2017  9:30 AM CST   Level O with [place] [facility] ([facility])    [address] [place] [address]   [phone]            Mar 06, 2017  3:50 PM CST   Return Visit with Emeterio Loza MD   [facility] ([facility]    [address] [place] [address]   [phone]            Mar 31, 2017   9:30 AM CDT   Level O with [place] [facility] ([facility])    [address] [address] [id number] [phone]            Apr 18, 2017  9:20 AM CDT   Return Visit with Mario Davenport MD   [facility]    [address] [facility] [id number] [phone]            May 15, 2017 12:30 PM CDT   Return Visit with Emeterio Loza MD   [facility] ([facility])    [address] [place] [address] [phone]              Who to contact     If you have questions or need follow up information about today's clinic visit or your schedule please contact [facility] directly at [phone].  Normal or non-critical lab and imaging results will be communicated to you by BuildingIQhart, letter or phone within 4 business days after the clinic has received the results. If you do not hear from us within 7 days, please contact the clinic through Geo[employer] or phone. If you have a critical or abnormal lab result, we will notify you by phone as soon as possible.  Submit refill requests through LOYAL3 or call your pharmacy and they will forward the refill request to us. Please allow 3 business days for your refill to be completed.          Additional Information About Your Visit        BuildingIQhart Information     LOYAL3 gives you secure access to your electronic health record. If you see a primary care provider, you can also send messages to your care team and make appointments. If you have questions, please call your primary care clinic.  If you do not have a primary care provider, please call [phone] and they will assist you.        Care EveryWhere ID     This is your Care EveryWhere ID. This could be used by other organizations to access your [place] medical records  [id number]        Your Vitals Were     Pulse Respirations Pulse Oximetry              74 20 98%          Blood Pressure from Last 3 Encounters:   02/13/17 185/78   02/03/17 138/75   01/27/17 118/58    Weight from Last 3 Encounters:   02/03/17 159 lb 9.6 oz (72.4 kg)   01/27/17 159 lb (72.1 kg)   01/06/17 160 lb 9.6 oz (72.8 kg)              Today, you had the following     No orders found for display       Primary Care Provider Office Phone # Fax #    Tre Lockett [phone]       [facility] [address]  [facility] [address]        Thank you!     Thank you for choosing [facility]  for your care. Our goal is always to provide you with excellent care. Hearing back from our patients is one way we can continue to improve our services. Please take a few minutes to complete the written survey that you may receive in the mail after your visit with us. Thank you!             Your Updated Medication List - Protect others around you: Learn how to safely use, store and throw away your medicines at www.disposemymeds.org.          This list is accurate as of: 2/13/17  4:41 PM.  Always use your most recent med list.                   Brand Name Dispense Instructions for use    albuterol (2.5 MG/3ML) 0.083% neb solution     50 vial    Take 1 vial (2.5 mg) by nebulization every 6 hours as needed for shortness of breath / dyspnea or wheezing       apixaban ANTICOAGULANT 2.5 MG tablet    ELIQUIS    60 tablet    Take 1 tablet (2.5 mg) by mouth 2 times daily       bismuth subsalicylate 262 MG chewable tablet    PEPTO BISMOL     Take 524 mg by mouth 4 times daily (before meals and nightly)       Blood Pressure Monitor Kit     1 kit    1 kit daily as needed       calcium-vitamin D 600-400 MG-UNIT per tablet    CALTRATE     Take 1 tablet by mouth 2 times daily       Carboxymethylcellulose Sodium 1 % Gel      1-2 drops (aka Genteal)       certolizumab pegol 2 X 200 MG/ML Kit 2 syringes/kit    CIMZIA     Inject 1 Syringe Subcutaneous       *  chlorhexidine 0.12 % solution    PERIDEX     Swish and spit 15 mLs in mouth daily       * chlorhexidine 0.12 % solution    PERIDEX    473 mL    Swish and spit 15 mLs in mouth daily       ciprofloxacin 250 MG tablet    CIPRO    90 tablet    Take 1 tablet (250 mg) by mouth daily       Cranberry 180 MG Caps      Take 2 capsules by mouth daily Unsure of strength       fish oil-omega-3 fatty acids 1000 MG capsule      Take 2 g by mouth daily. 2 capsules daily       fluticasone 50 MCG/ACT spray    FLONASE    16 g    Spray 2 sprays into both nostrils daily       folic acid 1 MG tablet    FOLVITE    100 tablet    Take 1 tablet (1 mg) by mouth daily       GENTEAL MILD OP      Apply  to eye daily.       IBANdronate 150 MG tablet    BONIVA    3 tablet    Take 1 tablet (150 mg) by mouth every 30 days       ipratropium 17 MCG/ACT Inhaler    ATROVENT HFA    1 Inhaler    Inhale 2 puffs into the lungs 4 times daily Use with Albuterol inhaler.       levothyroxine 75 MCG tablet    SYNTHROID/LEVOTHROID    90 tablet    Take 1 tablet (75 mcg) by mouth daily       losartan 100 MG tablet    COZAAR    90 tablet    TAKE ONE TABLET BY MOUTH EVERY DAY FOR BLOOD PRESSURE       LUCENTIS IO      1 injection every 4 Weeks       metoprolol 25 MG 24 hr tablet    TOPROL-XL    45 tablet    Take 0.5 tablets (12.5 mg) by mouth daily       nitroglycerin 0.4 MG sublingual tablet    NITROSTAT    25 tablet    Place 1 tablet (0.4 mg) under the tongue every 5 minutes as needed for chest pain       omeprazole 20 MG CR capsule    priLOSEC    90 capsule    TAKE ONE CAPSULE BY MOUTH 30 MINUTES BEFORE BREAKFAST. DO NOT TAKE WITH LEVOTHYROXINE       * order for DME     1 Box    Equipment being ordered: Dispense face mask. Mrs. Spicer is immunosuppressed due to rheumatoid arthritis.       * order for DME     1 each    Equipment being ordered: Nebulizer. Use with Albuterol.       order for DME     1 each    Equipment being ordered: Dispense baffle, for use with  nebulizer.       * order for DME     1 each    Equipment being ordered: Left wrist splint.       * order for DME     1 each    Equipment being ordered: Left wrist splint.       PRESERVISION AREDS PO      Take 2 tablets by mouth daily       REFRESH P.M. Oint      Apply  to eye. Daily at bedtime       saccharomyces boulardii 250 MG capsule    FLORASTOR     Take 250 mg by mouth       senna-docusate 8.6-50 MG per tablet    SENOKOT-S;PERICOLACE    120 tablet    Take 2 tablets by mouth At Bedtime Hold if diarrhea occurs.       spironolactone 25 MG tablet    ALDACTONE    90 tablet    Take 1 tablet (25 mg) by mouth daily       triamcinolone 0.1 % cream    KENALOG    453.6 g    Apply sparingly to affected area on face three times daily.       ZYRTEC ALLERGY 10 MG tablet   Generic drug:  cetirizine      Take 10 mg by mouth At Bedtime       * Notice:  This list has 6 medication(s) that are the same as other medications prescribed for you. Read the directions carefully, and ask your doctor or other care provider to review them with you.

## 2017-02-13 NOTE — NURSING NOTE
"RN Triage:     Chief Complaint   Patient presents with     Fatigue     BP of 117/49; extreme fatigue since pacemaker placement 1 week ago       Initial /78 (BP Location: Right arm, Patient Position: Chair, Cuff Size: Adult Regular)  Pulse 74  Resp 20  SpO2 98% Estimated body mass index is 28.27 kg/(m^2) as calculated from the following:    Height as of 1/27/17: 5' 3\" (1.6 m).    Weight as of 2/3/17: 159 lb 9.6 oz (72.4 kg).  BP completed using cuff size: regular    Assessment:  Patient presents for second level triage. Patient was informed when triaged on the phone to go to the ER. Pacemaker placed 1 week ago; patient is having fatigue; BP is 117/49 at home.    Triage Dispo: patient was taken to the ER by daughter.    Intervention: none    Sena Lloyd, RN    "

## 2017-02-15 ENCOUNTER — CARE COORDINATION (OUTPATIENT)
Dept: CARE COORDINATION | Facility: CLINIC | Age: 82
End: 2017-02-15

## 2017-02-15 NOTE — PROGRESS NOTES
Clinic Care Coordination Contact  [facility] / message with     Referral Source: [employer] Outreach (Per chart notes hospitalized X2 and ED x1 since last contact)  Clinical Data: Care Coordinator Outreach  Outreach attempted.  Left message on voicemail with call back information and requested return call.  Per chart notes, patient has had 2 hospital visits ([facility]) since our last contac. She was admitted 2/3 with near syncope and then again on 2/6 with afib and pacer was places. Pt called PCP yesterday C/O extreme fatigue that has not improved since pacer placed. Also had contacted pacer co. And was told she had some sort of event overnight.She was advised to be seen at ED. Per [place] chart notes, she was seen in ED. Work up was negative and she was discharged home with daughter and  late last evening. Today  reports patient is napping. He does not want to bother her. Pt. Has f/u with PCP tomorrow.  Plan: Care Coordinator mailed out care coordination introduction letter on 8/3/16. Care Coordinator will contact in 3-5 days.    John SANDERS,RN- BC  Clinic Care Coordinator  [facility]  Phone: [phone]

## 2017-02-16 ENCOUNTER — OFFICE VISIT (OUTPATIENT)
Dept: FAMILY MEDICINE | Facility: CLINIC | Age: 82
End: 2017-02-16
Payer: MEDICARE

## 2017-02-16 ENCOUNTER — RADIANT APPOINTMENT (OUTPATIENT)
Dept: GENERAL RADIOLOGY | Facility: CLINIC | Age: 82
End: 2017-02-16
Attending: INTERNAL MEDICINE
Payer: MEDICARE

## 2017-02-16 VITALS
OXYGEN SATURATION: 98 % | WEIGHT: 158 LBS | TEMPERATURE: 98.7 F | SYSTOLIC BLOOD PRESSURE: 130 MMHG | DIASTOLIC BLOOD PRESSURE: 70 MMHG | BODY MASS INDEX: 28 KG/M2 | HEIGHT: 63 IN | HEART RATE: 74 BPM

## 2017-02-16 DIAGNOSIS — D64.9 CHRONIC ANEMIA: ICD-10-CM

## 2017-02-16 DIAGNOSIS — J47.9 BRONCHIECTASIS WITHOUT COMPLICATION (H): ICD-10-CM

## 2017-02-16 DIAGNOSIS — I50.30 CONGESTIVE HEART FAILURE WITH PRESERVED LV FUNCTION, NYHA CLASS 3 (H): ICD-10-CM

## 2017-02-16 DIAGNOSIS — R53.83 OTHER FATIGUE: ICD-10-CM

## 2017-02-16 DIAGNOSIS — N30.20 CHRONIC CYSTITIS: ICD-10-CM

## 2017-02-16 DIAGNOSIS — J84.10 PULMONARY FIBROSIS (H): ICD-10-CM

## 2017-02-16 DIAGNOSIS — I49.5 SICK SINUS SYNDROME (H): Primary | ICD-10-CM

## 2017-02-16 LAB
ALBUMIN SERPL-MCNC: 3.6 G/DL (ref 3.4–5)
ALBUMIN UR-MCNC: NEGATIVE MG/DL
ALP SERPL-CCNC: 62 U/L (ref 40–150)
ALT SERPL W P-5'-P-CCNC: 21 U/L (ref 0–50)
ANION GAP SERPL CALCULATED.3IONS-SCNC: 8 MMOL/L (ref 3–14)
APPEARANCE UR: CLEAR
AST SERPL W P-5'-P-CCNC: 21 U/L (ref 0–45)
BACTERIA #/AREA URNS HPF: ABNORMAL /HPF
BASOPHILS # BLD AUTO: 0 10E9/L (ref 0–0.2)
BASOPHILS NFR BLD AUTO: 0.3 %
BILIRUB SERPL-MCNC: 0.3 MG/DL (ref 0.2–1.3)
BILIRUB UR QL STRIP: NEGATIVE
BUN SERPL-MCNC: 30 MG/DL (ref 7–30)
CALCIUM SERPL-MCNC: 8.5 MG/DL (ref 8.5–10.1)
CHLORIDE SERPL-SCNC: 103 MMOL/L (ref 94–109)
CO2 SERPL-SCNC: 27 MMOL/L (ref 20–32)
COLOR UR AUTO: YELLOW
CREAT SERPL-MCNC: 1.24 MG/DL (ref 0.52–1.04)
CRP SERPL-MCNC: 4.5 MG/L (ref 0–8)
DIFFERENTIAL METHOD BLD: ABNORMAL
EOSINOPHIL # BLD AUTO: 0.2 10E9/L (ref 0–0.7)
EOSINOPHIL NFR BLD AUTO: 2.9 %
ERYTHROCYTE [DISTWIDTH] IN BLOOD BY AUTOMATED COUNT: 12.4 % (ref 10–15)
FERRITIN SERPL-MCNC: 60 NG/ML (ref 8–252)
GFR SERPL CREATININE-BSD FRML MDRD: 41 ML/MIN/1.7M2
GLUCOSE SERPL-MCNC: 89 MG/DL (ref 70–99)
GLUCOSE UR STRIP-MCNC: NEGATIVE MG/DL
HCT VFR BLD AUTO: 32.5 % (ref 35–47)
HGB BLD-MCNC: 10.5 G/DL (ref 11.7–15.7)
HGB UR QL STRIP: NEGATIVE
IRON SATN MFR SERPL: 17 % (ref 15–46)
IRON SERPL-MCNC: 54 UG/DL (ref 35–180)
KETONES UR STRIP-MCNC: NEGATIVE MG/DL
LEUKOCYTE ESTERASE UR QL STRIP: ABNORMAL
LYMPHOCYTES # BLD AUTO: 2 10E9/L (ref 0.8–5.3)
LYMPHOCYTES NFR BLD AUTO: 34.7 %
MCH RBC QN AUTO: 31.9 PG (ref 26.5–33)
MCHC RBC AUTO-ENTMCNC: 32.3 G/DL (ref 31.5–36.5)
MCV RBC AUTO: 99 FL (ref 78–100)
MONOCYTES # BLD AUTO: 1.1 10E9/L (ref 0–1.3)
MONOCYTES NFR BLD AUTO: 18.8 %
NEUTROPHILS # BLD AUTO: 2.5 10E9/L (ref 1.6–8.3)
NEUTROPHILS NFR BLD AUTO: 43.3 %
NITRATE UR QL: NEGATIVE
PH UR STRIP: 5.5 PH (ref 5–7)
PLATELET # BLD AUTO: 256 10E9/L (ref 150–450)
POTASSIUM SERPL-SCNC: 4.7 MMOL/L (ref 3.4–5.3)
PROT SERPL-MCNC: 7.4 G/DL (ref 6.8–8.8)
RBC # BLD AUTO: 3.29 10E12/L (ref 3.8–5.2)
RBC #/AREA URNS AUTO: ABNORMAL /HPF (ref 0–2)
SODIUM SERPL-SCNC: 138 MMOL/L (ref 133–144)
SP GR UR STRIP: 1.01 (ref 1–1.03)
TIBC SERPL-MCNC: 322 UG/DL (ref 240–430)
TSH SERPL DL<=0.005 MIU/L-ACNC: 0.93 MU/L (ref 0.4–4)
URN SPEC COLLECT METH UR: ABNORMAL
UROBILINOGEN UR STRIP-ACNC: 0.2 EU/DL (ref 0.2–1)
WBC # BLD AUTO: 5.9 10E9/L (ref 4–11)
WBC #/AREA URNS AUTO: ABNORMAL /HPF (ref 0–2)

## 2017-02-16 PROCEDURE — 81001 URINALYSIS AUTO W/SCOPE: CPT | Performed by: INTERNAL MEDICINE

## 2017-02-16 PROCEDURE — 83550 IRON BINDING TEST: CPT | Performed by: INTERNAL MEDICINE

## 2017-02-16 PROCEDURE — 86140 C-REACTIVE PROTEIN: CPT | Performed by: INTERNAL MEDICINE

## 2017-02-16 PROCEDURE — 99214 OFFICE O/P EST MOD 30 MIN: CPT | Performed by: INTERNAL MEDICINE

## 2017-02-16 PROCEDURE — 82728 ASSAY OF FERRITIN: CPT | Performed by: INTERNAL MEDICINE

## 2017-02-16 PROCEDURE — 85025 COMPLETE CBC W/AUTO DIFF WBC: CPT | Performed by: INTERNAL MEDICINE

## 2017-02-16 PROCEDURE — 36415 COLL VENOUS BLD VENIPUNCTURE: CPT | Performed by: INTERNAL MEDICINE

## 2017-02-16 PROCEDURE — 71020 XR CHEST 2 VW: CPT

## 2017-02-16 PROCEDURE — 80053 COMPREHEN METABOLIC PANEL: CPT | Performed by: INTERNAL MEDICINE

## 2017-02-16 PROCEDURE — 83540 ASSAY OF IRON: CPT | Performed by: INTERNAL MEDICINE

## 2017-02-16 PROCEDURE — 84443 ASSAY THYROID STIM HORMONE: CPT | Performed by: INTERNAL MEDICINE

## 2017-02-16 RX ORDER — FUROSEMIDE 20 MG
20 TABLET ORAL EVERY MORNING
Qty: 30 TABLET | Refills: 5 | Status: SHIPPED | OUTPATIENT
Start: 2017-02-16 | End: 2017-02-16 | Stop reason: SINTOL

## 2017-02-16 RX ORDER — LEVOFLOXACIN 250 MG/1
250 TABLET, FILM COATED ORAL DAILY
Qty: 30 TABLET | Refills: 11 | Status: SHIPPED | OUTPATIENT
Start: 2017-02-16 | End: 2017-03-17

## 2017-02-16 NOTE — MR AVS SNAPSHOT
After Visit Summary   2/16/2017    Linda Spicer    MRN: [id number]           Patient Information     Date Of Birth          6/13/1931        Visit Information        Provider Department      2/16/2017 9:00 AM Tre Lockett MD [facility]        Today's Diagnoses     Atelectasis, bilateral    -  1    Chronic cystitis        Other fatigue        Chronic anemia        Congestive heart failure with preserved LV function, NYHA class 3 (H)        Bronchiectasis without complication (H)        Pulmonary fibrosis (H)        Chronic cystitis without hematuria          Care Instructions    This summary includes the important diagnoses, test, medications and other important parts of your medical history.  Below are a few good we sites you can use to learn more about these.     Www.Brighter.com.MoFuse : Up to date and easily searchable information on multiple topics.  Www.[email or web address]/c_[email or web address] : [facility] $4.99 medications  Www.Vatler.gov : medication info, interactive tutorials, watch real surgeries online  Www.familydoctor.org : good info from the Academy of Family Physicians  Www.OCP Collectiveinic.Regenesance : good info from the [facility]  Www.cdc.gov : public health info, travel advisories, epidemics (H1N1)  Www.aap.org : children's health info, normal development, vaccinations  Www.health.[facility].[place].us : [place] dept of heatlh, public health issues in [place], N1N1    Based on your medical history and these are the current health maintenance or preventive care services that you are due for (some may have been done at this visit:)  There are no preventive care reminders to display for this patient.  =================================================================================  Normal Values   Blood pressure  <140/90 for most adults    <130/80 for some chronic diseases (ask your care team about yours)    BMI (body mass index)  18.5-25 kg/m2 (based on height and weight)     Thank  you for visiting [facility]    Normal or non-critical lab and imaging results will be communicated to you by MyChart, letter or phone within 7 days.  If you do not hear from us within 10 days, please call the clinic. If you have a critical or abnormal lab result, we will notify you by phone as soon as possible.     If you have any questions regarding your visit please contact:     Team Comfort:   Clinic Hours Telephone Number   Dr. Aravind Novak   7am-5pm  Monday - Friday [phone]  Viktor RN   Pharmacy 8am-8pm Monday-Thursday      8am-6pm Friday  9am-5pm Saturday-Sunday [phone]   Urgent Care 11am-8pm Monday-Friday        9am-5pm Saturday-Sunday [phone]     After hours, weekend or if you need to make an appointment with your primary provider please call [phone].   After Hours nurse advise: call [facility]: [phone]    Medication Refills:  Call your pharmacy and they will forward the refill to us. Please allow 3 business days for your refills to be completed.    Use Fundgrazing (secure email communication and access to your chart) to send your primary care provider a message or make an appointment. Ask someone on your Team how to sign up for Fundgrazing. To log on to Coinsetter or for more information in MobileSnack please visit the website at [email or web address].  As of October 8, 2013, all password changes, disabled accounts, or ID changes in [email or web address]/MyHealth will be done by our Access Services Department.   If you need help with your account or password, call: [phone]. Clinic staff no longer has the ability to change passwords.     Understanding Bronchiectasis  Bronchiectasis is a condition in which the airways of the lungs (bronchi and bronchioles) become wider than normal. Over time the walls of the airways become thick and scarred. The damaged airways can t clear mucus as well.  Because of this, mucus builds up in the airways. This increases the risk for lung infections. Bronchiectasis is a long-term (chronic) condition.  What causes bronchiectasis?  Doctors do not know exactly what causes this condition. It occurs in people with lung infections who have long-term damage to the airways from other health problems.  Smokers and those with long-term lung disease are more likely to develop bronchiectasis. Some of the conditions that increase the chance for bronchiectasis include:    Cystic fibrosis    Lung infections such as pneumonia, tuberculosis, or whooping cough    Chronic obstructive pulmonary disease (COPD)    Problems with the body s defense (immune) system    Allergic bronchopulmonary aspergillosis (ABPA)    Long-term problem with inhaling food or liquids into the lungs (aspiration)    Certain autoimmune diseases such as rheumatoid arthritis    Blocked airway, such as from a tumor or inhaled object    Lung problems that are present at birth (congenital)  Symptoms of bronchiectasis  Damage to the airways often starts in childhood. You may not have symptoms until months or years after repeated lung infections. Some people have few or no symptoms. Others have daily symptoms that get worse over time. Common symptoms include:    Long-term cough    Coughing up a lot of thick mucus that may have blood in it    Trouble breathing    Inflammation of the nose and sinuses (rhinosinusitis)    Inflammation of the covering of the lungs (pleurisy or pleuritis)    Feeling tired  Diagnosing bronchiectasis  Your healthcare provider will ask about your past health and symptoms. You ll also have a physical exam. This includes listening to your chest with a stethoscope. You may need tests to help with the diagnosis, including:    Blood tests. These check for infection, immune system problems, and overall health.    Sputum culture. This is a test of the mucus in your lungs. It s checked for a bacterial or  fungal infection.    Chest X-ray. This is done to get information about your heart and lungs.    Chest CT scan. This gives detailed pictures of your airway. It s most often used to make the diagnosis.    Lung function tests. They include spirometry and a 6-minute walk test. These tests show how well your lungs work.    5667-8322 Boomerang.com. [address] [place], [place] [address]. All rights reserved. This information is not intended as a substitute for professional medical care. Always follow your healthcare professional's instructions.        Treatment for Bronchiectasis  Bronchiectasis is a condition in which the airways of the lungs become wider than normal. These airways are called bronchi and bronchioles. Over time the walls of the airways become thick and scarred. The damaged airways can t clear mucus as well. Because of this, mucus builds up in the airways. This increases the risk for lung infections. Bronchiectasis is a long-term (chronic) condition.  Types of treatment  Treating the cause of bronchiectasis can help to prevent more damage. For example, you may take antibiotic medicine for an infection caused by bacteria.  Bronchiectasis is a long-term (chronic) condition. There may be times when you have an infection and your symptoms get worse. During these times, you may be given antibiotic medicine to take by mouth.  If oral antibiotic medicine does not work, or if you have severe symptoms, you may need to stay in the hospital. While in the hospital, you may get antibiotic medicine in one of your veins (IV). You will get other treatment to help with breathing and other symptoms. For example, you may be given oxygen.  Surgery is another treatment. Your health care provider can tell you more about what kinds of treatment may work best for you.  Possible complications of bronchiectasis  Possible complications of bronchiectasis include:    Collapsed lung    Respiratory failure, when you  don t have enough oxygen in your blood    Heart failure, when your heart can t pump well  Managing bronchiectasis  Your health care provider will talk with you about managing your condition. You can take steps to help prevent bronchiectasis from getting worse, such as:    Avoiding people with respiratory infections, if possible    Keeping your hands clean by washing with soap and water or using antibacterial gel    Getting all the vaccines your health care provider advises    Taking antibiotic medicine exactly as prescribed, to treat or to prevent infections    Following all instructions to clear mucus from your airways, using special equipment or methods  Staying healthy with bronchiectasis  You ll need to take good care of your overall health. Make sure to:    Quit smoking, if you smoke. Talk with your health care provider. He or she can recommend medicines and programs that can help. Or call the national quit-line at 884-QUIT-BOI (488-157-3691).    Make sure you drink a lot of water or other healthy drinks every day.    Stay healthy by eating fresh fruits and vegetables, whole grains, low-fat milk foods, and lean meats.    Keep active and get exercise.     When to call the health care provider  Call your health care provider right away if you have any of these:    Fever of 100.4 F (38 C) or higher    Trouble breathing    Coughing that gets worse    Increased amount of mucus    Mucus that s darker in color        5599-1961 [employer]. [address] [place] [address], [place] [address]. All rights reserved. This information is not intended as a substitute for professional medical care. Always follow your healthcare professional's instructions.              Follow-ups after your visit        Your next 10 appointments already scheduled     Feb 21, 2017 10:40 AM CST   [facility]    [address] [place] [address]  [address]   [phone]            Mar 03, 2017  9:30 AM CST   Level O with BAY 4 INFUSION   [facility]    [address] [place] [address]   [phone]            Mar 06, 2017  3:50 PM CST   Return Visit with Emeterio Loza MD   [facility]    [address] [place] [address]   [phone]            Mar 31, 2017  9:30 AM CDT   Level O with BAY 4 INFUSION   [facility])    [address] [place] [address]   [phone]            Apr 18, 2017  9:20 AM CDT   Return Visit with Mario Davenport MD   [facility] ([facility])    [address] [place] [address]   [phone]            May 15, 2017 12:30 PM CDT   Return Visit with Emeterio Loza MD   [facility])    [address] [place] [address]   [phone]              Who to contact     If you have questions or need follow up information about today's clinic visit or your schedule please contact [facility] directly at [phone].  Normal or non-critical lab and imaging results will be communicated to you by MyChart, letter or phone within 4 business days after the clinic has received the results. If you do not hear from us within 7 days, please contact the clinic through [email or web address] or phone. If you have a critical or abnormal lab result, we will notify you by phone as soon as possible.  Submit refill requests through [employer] or call your pharmacy and they will forward the refill request to us. Please allow 3 business days for your refill to be completed.          Additional Information About Your Visit        [email or web address] Information     [employer] gives you secure access to your electronic health record. If you see a primary care  "provider, you can also send messages to your care team and make appointments. If you have questions, please call your primary care clinic.  If you do not have a primary care provider, please call [phone] and they will assist you.        Care EveryWhere ID     This is your Care EveryWhere ID. This could be used by other organizations to access your [facility] medical records  [id number]        Your Vitals Were     Pulse Temperature Height Pulse Oximetry BMI (Body Mass Index)       74 98.7  F (37.1  C) (Oral) 5' 3\" (1.6 m) 98% 27.99 kg/m2        Blood Pressure from Last 3 Encounters:   02/16/17 130/70   02/13/17 185/78   02/03/17 138/75    Weight from Last 3 Encounters:   02/16/17 158 lb (71.7 kg)   02/03/17 159 lb 9.6 oz (72.4 kg)   01/27/17 159 lb (72.1 kg)              We Performed the Following     *UA reflex to Microscopic     CBC with platelets and differential     Comprehensive metabolic panel (BMP + Alb, Alk Phos, ALT, AST, Total. Bili, TP)     CRP, inflammation     Ferritin     TSH with free T4 reflex     Urine Microscopic     XR Chest 2 Views          Today's Medication Changes          These changes are accurate as of: 2/16/17 10:25 AM.  If you have any questions, ask your nurse or doctor.               Start taking these medicines.        Dose/Directions    levofloxacin 250 MG tablet   Commonly known as:  LEVAQUIN   Used for:  Bronchiectasis without complication (H), Chronic cystitis without hematuria   Started by:  Tre Lockett MD        Dose:  250 mg   Take 1 tablet (250 mg) by mouth daily   Quantity:  30 tablet   Refills:  11         Stop taking these medicines if you haven't already. Please contact your care team if you have questions.     ciprofloxacin 250 MG tablet   Commonly known as:  CIPRO   Stopped by:  Tre Lockett MD                Where to get your medicines      These medications [place] [facility] [place] [place] - [address]  [address] [address] [facility] [address]     Phone:  [phone]     levofloxacin 250 MG tablet                Primary Care Provider Office Phone # Fax #    Tre Lockett [phone]       [facility] [address] [place]  [facility] [address]        Thank you!     Thank you for choosing [facility]  for your care. Our goal is always to provide you with excellent care. Hearing back from our patients is one way we can continue to improve our services. Please take a few minutes to complete the written survey that you may receive in the mail after your visit with us. Thank you!             Your Updated Medication List - Protect others around you: Learn how to safely use, store and throw away your medicines at www.disposemymeds.org.          This list is accurate as of: 2/16/17 10:25 AM.  Always use your most recent med list.                   Brand Name Dispense Instructions for use    apixaban ANTICOAGULANT 2.5 MG tablet    ELIQUIS    60 tablet    Take 1 tablet (2.5 mg) by mouth 2 times daily       Blood Pressure Monitor Kit     1 kit    1 kit daily as needed       calcium-vitamin D 600-400 MG-UNIT per tablet    CALTRATE     Take 1 tablet by mouth 2 times daily       Carboxymethylcellulose Sodium 1 % Gel      1-2 drops (aka Genteal)       certolizumab pegol 2 X 200 MG/ML Kit 2 syringes/kit    CIMZIA     Inject 1 Syringe Subcutaneous       Cranberry 180 MG Caps      Take 1 capsule by mouth daily Unsure of strength       fish oil-omega-3 fatty acids 1000 MG capsule      Take 2 g by mouth daily. 2 capsules daily       folic acid 1 MG tablet    FOLVITE    100 tablet    Take 1 tablet (1 mg) by mouth daily       GENTEAL MILD OP      Apply  to eye daily.       IBANdronate 150 MG tablet    BONIVA    3 tablet    Take 1 tablet (150 mg) by mouth every 30 days       levofloxacin 250 MG tablet    LEVAQUIN    30 tablet    Take 1 tablet (250 mg) by mouth daily        levothyroxine 75 MCG tablet    SYNTHROID/LEVOTHROID    90 tablet    Take 1 tablet (75 mcg) by mouth daily       losartan 100 MG tablet    COZAAR    90 tablet    TAKE ONE TABLET BY MOUTH EVERY DAY FOR BLOOD PRESSURE       LUCENTIS IO      1 injection every 4 Weeks       metoprolol 25 MG 24 hr tablet    TOPROL-XL    45 tablet    Take 0.5 tablets (12.5 mg) by mouth daily       nitroglycerin 0.4 MG sublingual tablet    NITROSTAT    25 tablet    Place 1 tablet (0.4 mg) under the tongue every 5 minutes as needed for chest pain       omeprazole 20 MG CR capsule    priLOSEC    90 capsule    TAKE ONE CAPSULE BY MOUTH 30 MINUTES BEFORE BREAKFAST. DO NOT TAKE WITH LEVOTHYROXINE       * order for DME     1 Box    Equipment being ordered: Dispense face mask. Mrs. Spicer is immunosuppressed due to rheumatoid arthritis.       * order for DME     1 each    Equipment being ordered: Nebulizer. Use with Albuterol.       order for DME     1 each    Equipment being ordered: Dispense baffle, for use with nebulizer.       * order for DME     1 each    Equipment being ordered: Left wrist splint.       * order for DME     1 each    Equipment being ordered: Left wrist splint.       PRESERVISION AREDS PO      Take 2 tablets by mouth daily       REFRESH P.M. Oint      Apply  to eye. Daily at bedtime       saccharomyces boulardii 250 MG capsule    FLORASTOR     Take 250 mg by mouth       senna-docusate 8.6-50 MG per tablet    SENOKOT-S;PERICOLACE    120 tablet    Take 2 tablets by mouth At Bedtime Hold if diarrhea occurs.       SPIRONOLACTONE PO      Take 25 mg by mouth daily       triamcinolone 0.1 % cream    KENALOG    453.6 g    Apply sparingly to affected area on face three times daily.       ZYRTEC ALLERGY 10 MG tablet   Generic drug:  cetirizine      Take 10 mg by mouth At Bedtime       * Notice:  This list has 4 medication(s) that are the same as other medications prescribed for you. Read the directions carefully, and ask your doctor or  other care provider to review them with you.

## 2017-02-16 NOTE — PROGRESS NOTES
SUBJECTIVE:                                                    Linda Spicer is a 85 year old female who presents to clinic today for the following health issues:    Hospital Follow-up Visit:    Hospital/Nursing Home/ Rehab Facility: [facility]  Date of Admission: 2-3-17  Date of Discharge: 2-8-17  Reason(s) for Admission: lightheadedness            Problems taking medications regularly:  None       Medication changes since discharge: None       Problems adhering to non-medication therapy:  None    Summary of hospitalization:  CareEverywhere information obtained and reviewed  Diagnostic Tests/Treatments reviewed.  Follow up needed: yes  Other Healthcare Providers Involved in Patient s Care:         None  Update since discharge: improved.     Post Discharge Medication Reconciliation: discharge medications reconciled and changed, per note/orders (see AVS).  Plan of care communicated with patient and family     Coding guidelines for this visit:  Type of Medical   Decision Making Face-to-Face Visit       within 7 Days of discharge Face-to-Face Visit        within 14 days of discharge   Moderate Complexity [id number]   High Complexity [id number]            Fatigue  Onset: 2/8/17  Description: easy fatigability  Course: worsening  Intensity: moderate  Associated symptoms: denies any chest pain  History: yes  Evaluation: yes  Precipitating factor: multifactorial  Alleviating factor: none  Complications: none  Therapies tried and outcome: none    Sick sinus sydnrome  Onset:   Description: 2/3/2017  Course: resolved  Intensity: severe  Associated symptoms: chest pain  History: no  Evaluation: Holter monitor last month did not show any sinus pauses.  Precipitating factor: use of beta blockers  Alleviating factor: permanent pacemaker placement.  Complications: none  Therapies tried and outcome: As above.                 Heart Failure Follow-up    Symptoms:    Shortness of breath: happens with rest and exertion -  improved    Lower extremity edema: none    Chest pain: No    Using more pillows than normal: No    Cough at night: No    Weight:    Checking weight daily: No    Weight change: none    Cardiology visits, ER/UC, or hospital admissions since last visit: Cardiology Visit - at [facility]. and ER/UC -     Medication side effects: none from Spironolactone (other than suspected orthostasis).         Problem list and histories reviewed & adjusted, as indicated.  Additional history: as documented    Patient Active Problem List   Diagnosis     Advanced directives, counseling/discussion     Hypertension goal BP (blood pressure) < 150/90     Hypothyroid     Diffuse idiopathic pulmonary fibrosis (H)     Macular degeneration, left eye     Irritable bowel syndrome     Encounter for palliative care     Adjustment disorder with anxious mood     Mild anemia     DDD (degenerative disc disease), lumbar     CKD (chronic kidney disease) stage 3, GFR 30-59 ml/min     History of blood transfusion     Aftercare following surgery     S/P lumbar laminectomy     High risk medication use     Osteopenia     Atrophic vaginitis     Fecal incontinence     Female stress incontinence     Impingement syndrome of both shoulders     UIP (usual interstitial pneumonitis) (H)     High risk medications (not anticoagulants) long-term use     Heart failure with preserved ejection fraction (H)     Congestive heart failure with preserved LV function, NYHA class 3 (H)     Other specified hypothyroidism     Rheumatoid arthritis involving multiple sites with positive rheumatoid factor (H)     Health Care Home     Sick sinus syndrome (H)     Past Surgical History   Procedure Laterality Date     Biopsy       hemorrhoidectomy     Ent surgery       tonsillectomy     Appendectomy       Hysterectomy, pap no longer indicated       Gyn surgery       3 D & C's     Laminectomy lumbar one level N/A 10/13/2015     Procedure: LAMINECTOMY LUMBAR ONE LEVEL;  Surgeon: Norbert  Fransico Melchor MD;  Location:  OR       Social History   Substance Use Topics     Smoking status: Never Smoker     Smokeless tobacco: Never Used     Alcohol use Yes      Comment: rare wine      Family History   Problem Relation Age of Onset     Hypertension Mother      Psychotic Disorder Father      DIABETES Son      DIABETES Daughter      Blood Disease Daughter          Allergies   Allergen Reactions     Cephalexin Hcl Diarrhea     Gabapentin Other (See Comments)     Dizzsiness     Naproxen GI Disturbance     Perfume      Lactase Other (See Comments)     Macrobid [Nitrofurantoin Anhydrous]      Possibly related to lung disease      Sulfa Drugs      Throat swelling     Xanax [Alprazolam] Other (See Comments)     Dizziness      Recent Labs   Lab Test  02/16/17   0949  12/22/16   1506  10/18/16   1000   06/03/16   0756   05/22/14   0821   LDL   --    --    --    --   109*   --   97   HDL   --    --    --    --   47*   --   42*   TRIG   --    --    --    --   75   --   84   ALT  21  31  30   --   36   < >   --    CR  1.24*  1.22*  1.31*   < >  1.44*   < >  1.21*   GFRESTIMATED  41*  42*  39*   < >  35*   < >  43*   GFRESTBLACK  50*  51*  47*   < >  42*   < >  52*   POTASSIUM  4.7  4.8   --    < >  5.1   < >  4.1   TSH  0.93  0.83   --    < >   --    < >  0.58    < > = values in this interval not displayed.      BP Readings from Last 3 Encounters:   02/21/17 130/70   02/21/17 150/72   02/16/17 130/70    Wt Readings from Last 3 Encounters:   02/21/17 157 lb (71.2 kg)   02/21/17 157 lb 3.2 oz (71.3 kg)   02/16/17 158 lb (71.7 kg)                  Labs reviewed in EPIC  Problem list, Medication list, Allergies, and Medical/Social/Surgical histories reviewed in [facility] and updated as appropriate.    ROS:  C: NEGATIVE for fever, chills, change in weight  I: NEGATIVE for worrisome rashes, moles or lesions  E: NEGATIVE for vision changes or irritation  E/M: NEGATIVE for ear, mouth and throat problems  R: NEGATIVE for  "significant cough or SOB  B: NEGATIVE for masses, tenderness or discharge  CV: NEGATIVE for chest pain, palpitations or peripheral edema  GI: NEGATIVE for nausea, abdominal pain, heartburn, or change in bowel habits  : NEGATIVE for frequency, dysuria, or hematuria  M: NEGATIVE for significant arthralgias or myalgia  N: NEGATIVE for weakness, dizziness or paresthesias  E: NEGATIVE for temperature intolerance, skin/hair changes  H: NEGATIVE for bleeding problems  P: NEGATIVE for changes in mood or affect    OBJECTIVE:                                                    /70  Pulse 74  Temp 98.7  F (37.1  C) (Oral)  Ht 5' 3\" (1.6 m)  Wt 158 lb (71.7 kg)  SpO2 98%  BMI 27.99 kg/m2  Body mass index is 27.99 kg/(m^2).  GENERAL: healthy, alert and no distress  EYES: Eyes grossly normal to inspection, PERRL and conjunctivae and sclerae normal  HENT: ear canals and TM's normal, nose and mouth without ulcers or lesions  NECK: no adenopathy, no asymmetry, masses, or scars and thyroid normal to palpation  RESP: lungs clear to auscultation - no rales, rhonchi or wheezes  CV: regular rate and rhythm, normal S1 S2, no S3 or S4, no murmur, click or rub, no peripheral edema and peripheral pulses strong  ABDOMEN: soft, nontender, no hepatosplenomegaly, no masses and bowel sounds normal  MS: no gross musculoskeletal defects noted, no edema  SKIN: no suspicious lesions or rashes  NEURO: Normal strength and tone, mentation intact and speech normal  PSYCH: mentation appears normal, affect normal/bright    Diagnostic Test Results:  Results for orders placed or performed in visit on 02/16/17   XR Chest 2 Views    Narrative    CHEST TWO VIEWS 2/16/2017 9:59 AM     HISTORY: Atelectasis.    COMPARISON: 4/19/2016.      Impression    IMPRESSION: There is a new cardiac device in the left chest wall with  leads in the projection of the right atrium and right ventricle.  Moderate cardiomegaly. There are unchanged bibasilar opacities " likely  due to fibrosis. No new infiltrates are seen.    WILFRIDO ANDRADE MD   *UA reflex to Microscopic   Result Value Ref Range    Color Urine Yellow     Appearance Urine Clear     Glucose Urine Negative NEG mg/dL    Bilirubin Urine Negative NEG    Ketones Urine Negative NEG mg/dL    Specific Gravity Urine 1.010 1.003 - 1.035    Blood Urine Negative NEG    pH Urine 5.5 5.0 - 7.0 pH    Protein Albumin Urine Negative NEG mg/dL    Urobilinogen Urine 0.2 0.2 - 1.0 EU/dL    Nitrite Urine Negative NEG    Leukocyte Esterase Urine Trace (A) NEG    Source Midstream Urine    CBC with platelets and differential   Result Value Ref Range    WBC 5.9 4.0 - 11.0 10e9/L    RBC Count 3.29 (L) 3.8 - 5.2 10e12/L    Hemoglobin 10.5 (L) 11.7 - 15.7 g/dL    Hematocrit 32.5 (L) 35.0 - 47.0 %    MCV 99 78 - 100 fl    MCH 31.9 26.5 - 33.0 pg    MCHC 32.3 31.5 - 36.5 g/dL    RDW 12.4 10.0 - 15.0 %    Platelet Count 256 150 - 450 10e9/L    Diff Method Automated Method     % Neutrophils 43.3 %    % Lymphocytes 34.7 %    % Monocytes 18.8 %    % Eosinophils 2.9 %    % Basophils 0.3 %    Absolute Neutrophil 2.5 1.6 - 8.3 10e9/L    Absolute Lymphocytes 2.0 0.8 - 5.3 10e9/L    Absolute Monocytes 1.1 0.0 - 1.3 10e9/L    Absolute Eosinophils 0.2 0.0 - 0.7 10e9/L    Absolute Basophils 0.0 0.0 - 0.2 10e9/L   Comprehensive metabolic panel (BMP + Alb, Alk Phos, ALT, AST, Total. Bili, TP)   Result Value Ref Range    Sodium 138 133 - 144 mmol/L    Potassium 4.7 3.4 - 5.3 mmol/L    Chloride 103 94 - 109 mmol/L    Carbon Dioxide 27 20 - 32 mmol/L    Anion Gap 8 3 - 14 mmol/L    Glucose 89 70 - 99 mg/dL    Urea Nitrogen 30 7 - 30 mg/dL    Creatinine 1.24 (H) 0.52 - 1.04 mg/dL    GFR Estimate 41 (L) >60 mL/min/1.7m2    GFR Estimate If Black 50 (L) >60 mL/min/1.7m2    Calcium 8.5 8.5 - 10.1 mg/dL    Bilirubin Total 0.3 0.2 - 1.3 mg/dL    Albumin 3.6 3.4 - 5.0 g/dL    Protein Total 7.4 6.8 - 8.8 g/dL    Alkaline Phosphatase 62 40 - 150 U/L    ALT 21 0 - 50 U/L     AST 21 0 - 45 U/L   CRP, inflammation   Result Value Ref Range    CRP Inflammation 4.5 0.0 - 8.0 mg/L   TSH with free T4 reflex   Result Value Ref Range    TSH 0.93 0.40 - 4.00 mU/L   Ferritin   Result Value Ref Range    Ferritin 60 8 - 252 ng/mL   Urine Microscopic   Result Value Ref Range    WBC Urine O - 2 0 - 2 /HPF    RBC Urine O - 2 0 - 2 /HPF    Bacteria Urine Few (A) NEG /HPF   Iron and iron binding capacity   Result Value Ref Range    Iron 54 35 - 180 ug/dL    Iron Binding Cap 322 240 - 430 ug/dL    Iron Saturation Index 17 15 - 46 %        ASSESSMENT/PLAN:                                                            (I49.5) Sick sinus syndrome (H)  (primary encounter diagnosis)  Comment: s/p permanent pacemaker placement.  Plan: Defer to Cardiology.    (J47.9) Bronchiectasis without complication (H)  Comment:   Plan: XR Chest 2 Views, levofloxacin (LEVAQUIN) 250         MG tablet, CT Chest w/o Contrast            (I50.30) Congestive heart failure with preserved LV function, NYHA class 3 (H)  Comment:   Plan: SPIRONOLACTONE PO, DISCONTINUED: furosemide         (LASIX) 20 MG tablet            (N30.20) Chronic cystitis  Comment:   Plan: *UA reflex to Microscopic, levofloxacin         (LEVAQUIN) 250 MG tablet, Urine Microscopic            (R53.83) Other fatigue  Comment:   Plan: CBC with platelets and differential,         Comprehensive metabolic panel (BMP + Alb, Alk         Phos, ALT, AST, Total. Bili, TP), CRP,         inflammation, TSH with free T4 reflex            (D64.9) Chronic anemia  Comment:   Plan: Ferritin, Iron and iron binding capacity            (J84.10) Pulmonary fibrosis (H)  Comment:   Plan: CT Chest w/o Contrast              Follow-up visit if condition worsens.    Tre Lockett MD  [facility]

## 2017-02-16 NOTE — NURSING NOTE
"Chief Complaint   Patient presents with     Hospital F/U       Initial /70 (BP Location: Right arm, Patient Position: Chair, Cuff Size: Adult Regular)  Pulse 74  Temp 98.7  F (37.1  C) (Oral)  Ht 5' 3\" (1.6 m)  Wt 158 lb (71.7 kg)  SpO2 98%  BMI 27.99 kg/m2 Estimated body mass index is 27.99 kg/(m^2) as calculated from the following:    Height as of this encounter: 5' 3\" (1.6 m).    Weight as of this encounter: 158 lb (71.7 kg).  Medication Reconciliation: complete     Jorge Forrester MA      "

## 2017-02-16 NOTE — PATIENT INSTRUCTIONS
This summary includes the important diagnoses, test, medications and other important parts of your medical history.  Below are a few good we sites you can use to learn more about these.     Www.Adly.org : Up to date and easily searchable information on multiple topics.  Www.Adly.org/Pharmacy/c_[email or web address] : [facility] $4.99 medications  Www.medlineplus.gov : medication info, interactive tutorials, watch real surgeries online  Www.familydoctor.org : good info from the Academy of Family Physicians  Www.may[facility]XMOSinic.com : good info from the [facility]  Www.cdc.gov : public health info, travel advisories, epidemics (H1N1)  Www.aap.org : children's health info, normal development, vaccinations  Www.health.[facility].[place].us : [place] dept of heat, public health issues in [place], N1N1    Based on your medical history and these are the current health maintenance or preventive care services that you are due for (some may have been done at this visit:)  There are no preventive care reminders to display for this patient.  =================================================================================  Normal Values   Blood pressure  <140/90 for most adults    <130/80 for some chronic diseases (ask your care team about yours)    BMI (body mass index)  18.5-25 kg/m2 (based on height and weight)     Thank you for visiting [facility]    Normal or non-critical lab and imaging results will be communicated to you by MyChart, letter or phone within 7 days.  If you do not hear from us within 10 days, please call the clinic. If you have a critical or abnormal lab result, we will notify you by phone as soon as possible.     If you have any questions regarding your visit please contact:     Team Comfort:   Clinic Hours Telephone Number   Dr. Aravind Novak   7am-5pm  Monday - Friday [phone]  [facility]   Pharmacy 8am-8pm  Monday-Thursday      8am-6pm Friday  9am-5pm Saturday-Sunday [phone]   Urgent Care 11am-8pm Monday-Friday        9am-5pm Saturday-Sunday [phone]     After hours, weekend or if you need to make an appointment with your primary provider please call [phone].   After Hours nurse advise: call [facility]: [phone]    Medication Refills:  Call your pharmacy and they will forward the refill to us. Please allow 3 business days for your refills to be completed.    Use Punctil (secure email communication and access to your chart) to send your primary care provider a message or make an appointment. Ask someone on your Team how to sign up for Punctil. To log on to [employer] or for more information in [email or web address] please visit the website at [email or web address].  As of October 8, 2013, all password changes, disabled accounts, or ID changes in Punctil/MyHealth will be done by our Access Services Department.   If you need help with your account or password, call: [phone]. Clinic staff no longer has the ability to change passwords.     Understanding Bronchiectasis  Bronchiectasis is a condition in which the airways of the lungs (bronchi and bronchioles) become wider than normal. Over time the walls of the airways become thick and scarred. The damaged airways can t clear mucus as well. Because of this, mucus builds up in the airways. This increases the risk for lung infections. Bronchiectasis is a long-term (chronic) condition.  What causes bronchiectasis?  Doctors do not know exactly what causes this condition. It occurs in people with lung infections who have long-term damage to the airways from other health problems.  Smokers and those with long-term lung disease are more likely to develop bronchiectasis. Some of the conditions that increase the chance for bronchiectasis include:    Cystic fibrosis    Lung infections such as pneumonia, tuberculosis, or whooping cough    Chronic obstructive  pulmonary disease (COPD)    Problems with the body s defense (immune) system    Allergic bronchopulmonary aspergillosis (ABPA)    Long-term problem with inhaling food or liquids into the lungs (aspiration)    Certain autoimmune diseases such as rheumatoid arthritis    Blocked airway, such as from a tumor or inhaled object    Lung problems that are present at birth (congenital)  Symptoms of bronchiectasis  Damage to the airways often starts in childhood. You may not have symptoms until months or years after repeated lung infections. Some people have few or no symptoms. Others have daily symptoms that get worse over time. Common symptoms include:    Long-term cough    Coughing up a lot of thick mucus that may have blood in it    Trouble breathing    Inflammation of the nose and sinuses (rhinosinusitis)    Inflammation of the covering of the lungs (pleurisy or pleuritis)    Feeling tired  Diagnosing bronchiectasis  Your healthcare provider will ask about your past health and symptoms. You ll also have a physical exam. This includes listening to your chest with a stethoscope. You may need tests to help with the diagnosis, including:    Blood tests. These check for infection, immune system problems, and overall health.    Sputum culture. This is a test of the mucus in your lungs. It s checked for a bacterial or fungal infection.    Chest X-ray. This is done to get information about your heart and lungs.    Chest CT scan. This gives detailed pictures of your airway. It s most often used to make the diagnosis.    Lung function tests. They include spirometry and a 6-minute walk test. These tests show how well your lungs work.    1353-9794 [employer]. [address] [place] [address], [place] [id number]. All rights reserved. This information is not intended as a substitute for professional medical care. Always follow your healthcare professional's instructions.        Treatment for Bronchiectasis  Bronchiectasis is a  condition in which the airways of the lungs become wider than normal. These airways are called bronchi and bronchioles. Over time the walls of the airways become thick and scarred. The damaged airways can t clear mucus as well. Because of this, mucus builds up in the airways. This increases the risk for lung infections. Bronchiectasis is a long-term (chronic) condition.  Types of treatment  Treating the cause of bronchiectasis can help to prevent more damage. For example, you may take antibiotic medicine for an infection caused by bacteria.  Bronchiectasis is a long-term (chronic) condition. There may be times when you have an infection and your symptoms get worse. During these times, you may be given antibiotic medicine to take by mouth.  If oral antibiotic medicine does not work, or if you have severe symptoms, you may need to stay in the hospital. While in the hospital, you may get antibiotic medicine in one of your veins (IV). You will get other treatment to help with breathing and other symptoms. For example, you may be given oxygen.  Surgery is another treatment. Your health care provider can tell you more about what kinds of treatment may work best for you.  Possible complications of bronchiectasis  Possible complications of bronchiectasis include:    Collapsed lung    Respiratory failure, when you don t have enough oxygen in your blood    Heart failure, when your heart can t pump well  Managing bronchiectasis  Your health care provider will talk with you about managing your condition. You can take steps to help prevent bronchiectasis from getting worse, such as:    Avoiding people with respiratory infections, if possible    Keeping your hands clean by washing with soap and water or using antibacterial gel    Getting all the vaccines your health care provider advises    Taking antibiotic medicine exactly as prescribed, to treat or to prevent infections    Following all instructions to clear mucus from your  airways, using special equipment or methods  Staying healthy with bronchiectasis  You ll need to take good care of your overall health. Make sure to:    Quit smoking, if you smoke. Talk with your health care provider. He or she can recommend medicines and programs that can help. Or call the national quit-line at 432-QUIT-NOW (720-089-8541).    Make sure you drink a lot of water or other healthy drinks every day.    Stay healthy by eating fresh fruits and vegetables, whole grains, low-fat milk foods, and lean meats.    Keep active and get exercise.     When to call the health care provider  Call your health care provider right away if you have any of these:    Fever of 100.4 F (38 C) or higher    Trouble breathing    Coughing that gets worse    Increased amount of mucus    Mucus that s darker in color        6283-0830 [employer]. [address] [place] [address], [place] [address]. All rights reserved. This information is not intended as a substitute for professional medical care. Always follow your healthcare professional's instructions.

## 2017-02-18 ENCOUNTER — TRANSFERRED RECORDS (OUTPATIENT)
Dept: HEALTH INFORMATION MANAGEMENT | Facility: CLINIC | Age: 82
End: 2017-02-18

## 2017-02-19 PROBLEM — I49.5 SICK SINUS SYNDROME (H): Status: ACTIVE | Noted: 2017-02-19

## 2017-02-20 ENCOUNTER — CARE COORDINATION (OUTPATIENT)
Dept: CARE COORDINATION | Facility: CLINIC | Age: 82
End: 2017-02-20

## 2017-02-20 ENCOUNTER — RADIANT APPOINTMENT (OUTPATIENT)
Dept: CT IMAGING | Facility: CLINIC | Age: 82
End: 2017-02-20
Attending: INTERNAL MEDICINE
Payer: MEDICARE

## 2017-02-20 DIAGNOSIS — J47.9 BRONCHIECTASIS WITHOUT COMPLICATION (H): ICD-10-CM

## 2017-02-20 DIAGNOSIS — J84.10 PULMONARY FIBROSIS (H): ICD-10-CM

## 2017-02-20 PROCEDURE — 71250 CT THORAX DX C-: CPT | Performed by: RADIOLOGY

## 2017-02-20 NOTE — PROGRESS NOTES
Clinic Care Coordination Contact  OUTREACH    Referral Information:  Referral Source: [facility] (Per chart notes hospitalized X2 and ED x1 since last contact)  Reason for Contact: Patient and daughter left  for f/U      [place] Utilization:   ED Visits in last year: 8  Hospital visits in last year: 4  Last PCP appointment: 01/27/17  Missed Appointments: 1  Concerns: no  Multiple Providers or Specialists: yes    Clinical Concerns:  Current Medical Concerns: Patient and her daughter Toya called with questions for this RN CC. Pt has been feeling dizzy. Saw PCP last week, and was started on Levofloxacin for UTI/Bronchiectasis per daughter. Patient has been having dizziness for quite sometime and adjustments have been made to cardiac medications. Per daughter, she was on XR metoprolol but that was changed to 1/2 tab of non-XR. However, she continues to be dizzy. Per daughter, patient went to the [place] ED on Saturday and was discharged home. Per ED report, it appears that cardiac issues were ruled out and they thought it was a panic attack and possibly a mild reaction to new Levofloxacin. Per daughter, dizziness is not new or changed, they have stopped her levofloxacin since Saturday and now want to stop Metorolol until they are able to see Dr. Lockett tomorrow. They were looking for guidance. Discussed importance of not just starting and stopping medications. Dizziness has not changed since starting Levofloxacin so they plan on restarting today (even though ED suggested stopping). They want to hold this evenings dose of metoprolol and will discuss it further with Dr. Lockett tomorrow.  Current Behavioral Concerns: None    Education Provided to patient: Importance of not just stopping medications abruptly, although sometimes side effects require this.   Clinical Pathway Name: None    Medication Management:  Daughter sets up medications and monitors.     Functional Status:  Mobility Status: Independent  Equipment  Currently Used at Home: raised toilet, shower chair  Transportation: Daughter transports      Psychosocial:  Current living arrangement: I live in a private home with spouse (Live in a Senior Apartment)  Financial/Insurance:No concerns     Resources and Interventions:  Current Resources: Other (see comment) (Heart Failure Action Plan)  Advanced Care Plans/Directives on file: In process   Patient/Caregiver understanding:Patient has an appointment with Dr. Lockett tomorrow. They will discuss her dizziness and if it is medication related.  Frequency of Care Coordination: as needed  Upcoming appointment: 02/21/17    Plan:Patient will see Dr. Lockett tomorrow. Daughter will be present.           RN CC will F/U as planned.    John SANDERS,RN- BC  Clinic Care Coordinator  [facility]  Phone: [phone]

## 2017-02-21 ENCOUNTER — OFFICE VISIT (OUTPATIENT)
Dept: FAMILY MEDICINE | Facility: CLINIC | Age: 82
End: 2017-02-21
Payer: MEDICARE

## 2017-02-21 ENCOUNTER — OFFICE VISIT (OUTPATIENT)
Dept: RHEUMATOLOGY | Facility: CLINIC | Age: 82
End: 2017-02-21
Payer: MEDICARE

## 2017-02-21 VITALS
WEIGHT: 157.2 LBS | HEIGHT: 63 IN | HEART RATE: 80 BPM | OXYGEN SATURATION: 98 % | BODY MASS INDEX: 27.85 KG/M2 | TEMPERATURE: 96.6 F | SYSTOLIC BLOOD PRESSURE: 150 MMHG | DIASTOLIC BLOOD PRESSURE: 72 MMHG

## 2017-02-21 DIAGNOSIS — I10 HYPERTENSION GOAL BP (BLOOD PRESSURE) < 150/90: ICD-10-CM

## 2017-02-21 DIAGNOSIS — I48.20 CHRONIC ATRIAL FIBRILLATION (H): ICD-10-CM

## 2017-02-21 DIAGNOSIS — Z79.899 HIGH RISK MEDICATION USE: ICD-10-CM

## 2017-02-21 DIAGNOSIS — Z95.0 CARDIAC PACEMAKER IN SITU: ICD-10-CM

## 2017-02-21 DIAGNOSIS — M05.79 RHEUMATOID ARTHRITIS INVOLVING MULTIPLE SITES WITH POSITIVE RHEUMATOID FACTOR (H): Primary | ICD-10-CM

## 2017-02-21 DIAGNOSIS — I95.1 ORTHOSTASIS: Primary | ICD-10-CM

## 2017-02-21 PROCEDURE — 86480 TB TEST CELL IMMUN MEASURE: CPT | Performed by: INTERNAL MEDICINE

## 2017-02-21 PROCEDURE — 99214 OFFICE O/P EST MOD 30 MIN: CPT | Performed by: INTERNAL MEDICINE

## 2017-02-21 PROCEDURE — 36415 COLL VENOUS BLD VENIPUNCTURE: CPT | Performed by: INTERNAL MEDICINE

## 2017-02-21 NOTE — PROGRESS NOTES
Rheumatology Clinic Visit      Linda Spicer MRN# [id number]   YOB: 1931 Age: 85 year old      Date of visit: 2/21/17   PCP: Dr. Tre Lockett  Pulmonology: Dr. Sandra Comer  Cardiology: Dr. Emeterio Loza  Dermatology: Dr. Andrews Knott at [facility] in [place]     Chief Complaint   Patient presents with:  Arthritis: Patient states RA is not real bad, feet still hurt some. Pacemaker put in on 2/6/17, also has infection in lungs.      Assessment and Plan     1. Seropositive Nonerosive Rheumatoid Arthritis (RF 99, CCP 52): Previously treated with hydroxychloroquine 200 mg daily (stopped previously because of macular degeneration) and methotrexate (leg cramps).  Has sulfa allergy. Currently on Cimzia 400 mg monthly. Recently with oral sores that are unlikely due to Cimzia, but given the recurrent basal cell carcinoma and cardiac issues will change Cimzia to Orencia.  We discussed this in detail today.    - Discontinue cimzia  - Start orencia 750mg IV to be given at the [place] infusion center  - Labs today: Quantiferon TB  - Labs 2-3 days prior to the next rheumatology clinic visit: CBC, Creatinine, Hepatic Panel, ESR, CRP    # Abatacept (Orencia) Risks and Benefits: The risks and benefits of abatacept were discussed in detail and the patient verbalized understanding.  The risks discussed include, but are not limited to, the risk for hypersensitivity, anaphylaxis, anaphylactoid reactions, an increased risk for serious infections leading to hospitalization or death, and an increased risk for more frequent respiratory adverse events in patients with COPD.  If subcutaneous injections are used, then injection site reactions and/or pain may occur at the site of injection.  The most common side effects were discussed that include headache, upper respiratory tract infections, nasopharyngitis, and nausea.  It was discussed that the medication would need to be discontinued if a serious infection  develops.  It was discussed that live vaccinations should not be received while using abatacept or within 30 days prior to starting abatacept.  I encouraged reviewing the package insert and asking any questions about the medication.        2. Shoulder impingement syndrome, bilaterally: Maintaining ROM with exercises at home. She was previously referred to PT but did not go.     3. Macular degeneration: listed here because of clinical significance    4. History of basal cell carcinoma: Reportedly with one lesion removed in 2007 and recurrence in fall 2016.    5. Heart failure: She is followed by cardiology.  Recently had a pacemaker placed per patient.     6. Pulmonary fibrosis / UIP: Many of her symptoms appear to be related to her travels to [place], and therefore she no longer goes to [place].  2013 chest CT with contrast performed at [facility] documents UIP pattern. There may be an association with rheumatoid arthritis.  Follows with pulmonology in the past and is considering seeing again in the near future but wants to discuss with Dr. Cathryn scott.     7. Hypertersion: Blood pressure checked this clinic visit and was elevated; management per PCP/cardiology.    8. Bone Health: Managed by PCP already.     9. Vaccinations:   - Influenza: encouraged yearly vaccination   - [id number]: up to date  - [id number]: up to date  - Zostavax: up to date    Ms. Spicer verbalized agreement with and understanding of the rational for the diagnosis and treatment plan.  All questions were answered to best of my ability and the patient's satisfaction. Ms. Spicer was advised to contact the clinic with any questions that may arise after the clinic visit.      Thank you for involving me in the care of the patient    Return to clinic: 3 months      HPI   Linda Spicer is a 85 year old female with a medical history significant for hypertension, heart failure, pulmonary fibrosis, audible bowel syndrome, degenerative disc disease of the  lumbar spine status post laminectomy, chronic kidney disease, history of basal cell carcinoma, and rheumatoid arthritis who presents for follow-up of rheumatoid arthritis.    Today, she reports doing well with regard to her rheumatoid nephritis. No morning stiffness. No joint pain. However, she had oral sores in her mouth about 1 month ago that was possibly related to Cimzia; she tells me that folic acid made sores better. She also recently had a pacemaker placed for heart failure/atrial fibrillation. In fall 2016 she had basal cell carcinoma removed.     Denies fevers, chills, nausea, vomiting, constipation, diarrhea. No abdominal pain. No chest pain/pressure, palpitations.  Chronic SOB. No oral or nasal sores. No neck pain.  No LE swelling.  No photosensitivity or photophobia.  No sicca symptoms.  No eye pain or redness.     Tobacco: None  EtOH: rare  Drugs: none  Used to live in [place], [place] part time.    ROS   GEN: No fevers, chills, night sweats, or weight change  SKIN: See HPI  HEENT: No epistaxis. No oral or nasal ulcers.  CV: No chest pain, pressure, palpitations  PULM: No wheeze, cough.  GI: No nausea, vomiting, constipation, diarrhea. No blood in stool. No abdominal pain.  : No blood in urine.  MSK: See HPI.  NEURO: No numbness, tingling, or weakness.  ENDO: No heat/cold intolerance.  EXT: No LE swelling    Active Problem List     Patient Active Problem List   Diagnosis     Advanced directives, counseling/discussion     Hypertension goal BP (blood pressure) < 140/90     Hypothyroid     Diffuse idiopathic pulmonary fibrosis (H)     Macular degeneration, left eye     Irritable bowel syndrome     Encounter for palliative care     Adjustment disorder with anxious mood     Mild anemia     DDD (degenerative disc disease), lumbar     CKD (chronic kidney disease) stage 3, GFR 30-59 ml/min     History of blood transfusion     Aftercare following surgery     S/P lumbar laminectomy     High risk medication use      Osteopenia     Atrophic vaginitis     Fecal incontinence     Female stress incontinence     Impingement syndrome of both shoulders     UIP (usual interstitial pneumonitis) (H)     High risk medications (not anticoagulants) long-term use     Heart failure with preserved ejection fraction (H)     Congestive heart failure with preserved LV function, NYHA class 3 (H)     Other specified hypothyroidism     Rheumatoid arthritis involving multiple sites with positive rheumatoid factor (H)     Health Care Home     Sick sinus syndrome (H)     Past Medical History     Past Medical History   Diagnosis Date     Adjustment disorder with anxious mood 5/18/2015     Advanced directives, counseling/discussion 8/30/2012     Patient states has Advance Directive and will bring in a copy to clinic. 8/30/2012   [facility] Medical Assistant \       Anemia 9/25/2015     Basal cell cancer      CHF (congestive heart failure) (H) 9/18/2014     CKD (chronic kidney disease) stage 3, GFR 30-59 ml/min 9/29/2015     DDD (degenerative disc disease), lumbar 9/25/2015     Diffuse idiopathic pulmonary fibrosis (H) 5/6/2013     Encounter for palliative care 5/18/2015     History of blood transfusion 9/29/2015     Hypertension goal BP (blood pressure) < 140/90 9/7/2012     Hypothyroid 9/7/2012     Irritable bowel syndrome 10/29/2013     Macular degeneration      Macular degeneration, 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     Nondisplaced spiral fracture of shaft of humerus      Osteoporosis 8/13/2013      Imo Update utility     RA (rheumatoid arthritis) (H) 5/7/2013     Rheumatic fever      Shingles      Spinal stenosis of lumbar region with neurogenic claudication 9/14/2015     Past Surgical History     Past Surgical History   Procedure Laterality Date     Biopsy       hemorrhoidectomy     Ent surgery       tonsillectomy     Appendectomy       Hysterectomy, pap no longer indicated       Gyn surgery       3 D & C's     Laminectomy lumbar one level N/A  10/13/2015     Procedure: LAMINECTOMY LUMBAR ONE LEVEL;  Surgeon: Fransico Toussaint MD;  Location: [facility] OR     Allergy     Allergies   Allergen Reactions     Cephalexin Hcl Diarrhea     Gabapentin Other (See Comments)     Dizzsiness     Naproxen GI Disturbance     Perfume      Lactase Other (See Comments)     Macrobid [Nitrofurantoin Anhydrous]      Possibly related to lung disease      Sulfa Drugs      Throat swelling     Xanax [Alprazolam] Other (See Comments)     Dizziness      Current Medication List     Current Outpatient Prescriptions   Medication Sig     levofloxacin (LEVAQUIN) 250 MG tablet Take 1 tablet (250 mg) by mouth daily     SPIRONOLACTONE PO Take 25 mg by mouth daily     folic acid (FOLVITE) 1 MG tablet Take 1 tablet (1 mg) by mouth daily     apixaban ANTICOAGULANT (ELIQUIS) 2.5 MG tablet Take 1 tablet (2.5 mg) by mouth 2 times daily     omeprazole (PRILOSEC) 20 MG CR capsule TAKE ONE CAPSULE BY MOUTH 30 MINUTES BEFORE BREAKFAST. DO NOT TAKE WITH LEVOTHYROXINE     losartan (COZAAR) 100 MG tablet TAKE ONE TABLET BY MOUTH EVERY DAY FOR BLOOD PRESSURE     senna-docusate (SENOKOT-S;PERICOLACE) 8.6-50 MG per tablet Take 2 tablets by mouth At Bedtime Hold if diarrhea occurs.     Cranberry 180 MG CAPS Take 1 capsule by mouth daily Unsure of strength     metoprolol (TOPROL-XL) 25 MG 24 hr tablet Take 0.5 tablets (12.5 mg) by mouth daily     Carboxymethylcellulose Sodium 1 % GEL 1-2 drops (aka Genteal)     certolizumab pegol (CIMZIA) 2 X 200 MG/ML KIT 2 syringes/kit Inject 1 Syringe Subcutaneous     saccharomyces boulardii (FLORASTOR) 250 MG capsule Take 250 mg by mouth     levothyroxine (SYNTHROID, LEVOTHROID) 75 MCG tablet Take 1 tablet (75 mcg) by mouth daily     order for DME Equipment being ordered: Left wrist splint.     order for DME Equipment being ordered: Left wrist splint.     IBANdronate (BONIVA) 150 MG tablet Take 1 tablet (150 mg) by mouth every 30 days     order for DME Equipment being  ordered: Dispense baffle, for use with nebulizer.     order for DME Equipment being ordered: Nebulizer. Use with Albuterol.     order for DME Equipment being ordered: Dispense face mask.  Mrs. Spicer is immunosuppressed due to rheumatoid arthritis.     triamcinolone (KENALOG) 0.1 % cream Apply sparingly to affected area on face three times daily.     Multiple Vitamins-Minerals (PRESERVISION AREDS PO) Take 2 tablets by mouth daily     Blood Pressure Monitor KIT 1 kit daily as needed     nitroglycerin (NITROSTAT) 0.4 MG SL tablet Place 1 tablet (0.4 mg) under the tongue every 5 minutes as needed for chest pain     Ranibizumab (LUCENTIS IO) 1 injection every 4 Weeks     cetirizine (ZYRTEC ALLERGY) 10 MG tablet Take 10 mg by mouth At Bedtime     Hypromellose (GENTEAL MILD OP) Apply  to eye daily.     Artificial Tear Ointment (REFRESH P.M.) OINT Apply  to eye. Daily at bedtime        calcium-vitamin D (CALTRATE) 600-400 MG-UNIT per tablet Take 1 tablet by mouth 2 times daily      fish oil-omega-3 fatty acids (FISH OIL) 1000 MG capsule Take 2 g by mouth daily. 2 capsules daily          No current facility-administered medications for this visit.      Facility-Administered Medications Ordered in Other Visits   Medication     DOBUTamine 500 mg in dextrose 5% 250 mL (adult std)     DOBUTamine 500 mg in dextrose 5% 250 mL (adult std)       Social History   See HPI    Family History     Family History   Problem Relation Age of Onset     Hypertension Mother      Psychotic Disorder Father      DIABETES Son      DIABETES Daughter      Blood Disease Daughter      Physical Exam     Temp Readings from Last 3 Encounters:   02/21/17 96.6  F (35.9  C) (Oral)   02/16/17 98.7  F (37.1  C) (Oral)   02/03/17 97.3  F (36.3  C) (Oral)     BP Readings from Last 5 Encounters:   02/21/17 140/64   02/21/17 150/72   02/16/17 130/70   02/13/17 185/78   02/03/17 138/75     Pulse Readings from Last 1 Encounters:   02/21/17 80     Resp Readings from  "Last 1 Encounters:   02/13/17 20     Estimated body mass index is 27.85 kg/(m^2) as calculated from the following:    Height as of this encounter: 1.6 m (5' 3\").    Weight as of this encounter: 71.3 kg (157 lb 3.2 oz).    GEN: NAD  HEENT: MMM. No oral lesions. Anicteric, noninjected sclera  CV: S1, S2. RRR. No m/r/g.  PULM: CTA bilaterally. No w/c. Not using supplemental oxygen. Appears short of breath while talking, which she tells me is her baseline.  MSK: Mild synovial swelling and tenderness to palpation of the right third-fourth PIPs Wrists and elbows without synovial swelling, increased warmth, tenderness to palpation, or overlying erythema.  Negative MCP squeeze.  Normal  strength. Bilateral shoulders nontender to palpation; positive Mann test bilaterally; negative lift off test, positive empty can sign. Knees mildly tender at the medial joint lines bilaterally. Ankles and feet without swelling, increased warmth, tenderness to palpation, or overlying erythema. Negative MTP squeeze.  No dactylitis.  NEURO: UE and LE strengths 5/5 and equal bilaterally.   SKIN: No rash  EXT: No LE edema  PSYCH: Alert. Appropriate.    Labs / Imaging (select studies)   RF/CCP  Recent Labs   Lab Test  04/05/16   1036   CCPIGG  52*   RHF  99*     CBC  Recent Labs   Lab Test  02/16/17   0949  10/18/16   1000  08/18/16   1002   WBC  5.9  7.9  7.6   RBC  3.29*  3.56*  3.45*   HGB  10.5*  11.5*  11.2*   HCT  32.5*  34.4*  33.5*   MCV  99  97  97   RDW  12.4  13.0  12.6   PLT  256  279  273   MCH  31.9  32.3  32.5   MCHC  32.3  33.4  33.4   NEUTROPHIL  43.3  56.9  62.9   LYMPH  34.7  30.6  24.3   MONOCYTE  18.8  9.5  8.4   EOSINOPHIL  2.9  2.5  4.0   BASOPHIL  0.3  0.5  0.4   ANEU  2.5  4.5  4.8   ALYM  2.0  2.4  1.8   YEHUDA  1.1  0.8  0.6   AEOS  0.2  0.2  0.3   ABAS  0.0  0.0  0.0     CMP  Recent Labs   Lab Test  02/16/17   0949  12/22/16   1506  10/18/16   1000  08/18/16   1002   NA  138  139   --   140   POTASSIUM  4.7  4.8   " --   4.4   CHLORIDE  103  105   --   108   CO2  27  26   --   24   ANIONGAP  8  8   --   8   GLC  89  146*   --   117*   BUN  30  37*   --   33*   CR  1.24*  1.22*  1.31*  1.22*   GFRESTIMATED  41*  42*  39*  42*   GFRESTBLACK  50*  51*  47*  51*   FATOUMATA  8.5  9.2   --   9.1   BILITOTAL  0.3  0.4  0.4   --    ALBUMIN  3.6  3.9  3.7   --    PROTTOTAL  7.4  8.1  7.4   --    ALKPHOS  62  66  64   --    AST  21  22  18   --    ALT  21  31  30   --      Iron Studies  Recent Labs   Lab Test  02/16/17   0949  12/22/16   1506  01/29/16   1044  05/23/13   1718   BAILEE  60  42  38  37   IRON  54   --   66  82   FEB  322   --   365  324   IRONSAT  17   --   18  25     Calcium/VitaminD  Recent Labs   Lab Test  02/16/17   0949  12/22/16   1506  08/18/16   1002   FATOUMATA  8.5  9.2  9.1     ESR/CRP  Recent Labs   Lab Test  02/16/17   0949  04/19/16   1800  04/05/16   1036   SED   --    --   28   CRP  4.5  <2.9   --      TSH/T4  Recent Labs   Lab Test  02/16/17   0949  12/22/16   1506  08/01/16   0845  04/08/15   1148   TSH  0.93  0.83  0.64  0.60   T4   --    --   1.19  1.32     Lipid Panel  Recent Labs   Lab Test  06/03/16   0756  05/22/14   0821   CHOL  171  155   TRIG  75  84   HDL  47*  42*   LDL  109*  97   VLDL   --   17   CHOLHDLRATIO   --   3.7   NHDL  124   --      Hepatitis B  Recent Labs   Lab Test  04/05/16   1036   HBCAB  Nonreactive   HEPBANG  Nonreactive     Hepatitis C  Recent Labs   Lab Test  04/05/16   1036   HCVAB  Nonreactive   Assay performance characteristics have not been established for newborns,   infants, and children       Tuberculosis Screening  Recent Labs   Lab Test  04/05/16   1037   TBRSLT  Negative   TBAGN  0.03     HIV Screening  Recent Labs   Lab Test  04/05/16   1036   HIAGAB  Nonreactive   HIV-1 p24 Ag & HIV-1/HIV-2 Ab Not Detected       UA  Recent Labs   Lab Test  02/16/17   1008  12/22/16   1517  10/21/16   0947  09/01/16   0940   08/09/16   1021  07/28/16   0901  07/24/16   0953   COLOR  Yellow   Yellow  Yellow  Yellow   < >  Yellow  Yellow  Yellow   APPEARANCE  Clear  Clear  Clear  Clear   < >  Clear  Clear  Slightly Cloudy   URINEGLC  Negative  Negative  Negative  Negative   < >  Negative  Negative  Negative   URINEBILI  Negative  Negative  Negative  Negative   < >  Negative  Negative  Negative   SG  1.010  1.010  1.010  1.010   < >  1.020  1.020  1.020   URINEPH  5.5  6.0  5.5  5.5   < >  5.5  5.0  5.5   PROTEIN  Negative  Negative  Negative  Negative   < >  Negative  Negative  Negative   UROBILINOGEN  0.2  0.2  0.2  0.2   < >  0.2  0.2  0.2   NITRITE  Negative  Negative  Negative  Negative   < >  Negative  Negative  Negative   UBLD  Negative  Negative  Negative  Negative   < >  Trace*  Negative  Small*   LEUKEST  Trace*  Negative  Negative  Trace*   < >  Negative  Negative  Large*   WBCU  O - 2   --    --   2-5*   --   10-25  Urine culture added due to reflex criteria  *  O - 2  >100  Clumps of WBC's seen  *   RBCU  O - 2   --    --   O - 2   --   O - 2  O - 2  2-5*   SQUAMOUSEPI   --    --    --   Few   --   Moderate*  Few   --    BACTERIA  Few*   --    --    --    --   Many*   --   Many*    < > = values in this interval not displayed.     Urine Microscopic  Recent Labs   Lab Test  02/16/17   1008  09/01/16   0940  08/09/16   1021  07/28/16   0901  07/24/16   0953   WBCU  O - 2  2-5*  10-25  Urine culture added due to reflex criteria  *  O - 2  >100  Clumps of WBC's seen  *   RBCU  O - 2  O - 2  O - 2  O - 2  2-5*   SQUAMOUSEPI   --   Few  Moderate*  Few   --    BACTERIA  Few*   --   Many*   --   Many*     Immunization History     Immunization History   Administered Date(s) Administered     Influenza (High Dose) 3 valent vaccine 09/07/2012, 10/08/2013, 09/26/2014, 10/09/2015, 09/21/2016     Influenza (IIV3) 08/29/2011     Pneumococcal (PCV 13) 04/05/2016     Pneumococcal 23 valent 05/01/2001, 10/04/2010     TD (ADULT, 7+) 07/01/2008          Chart documentation done in part with Dragon Voice recognition  Software. Although reviewed after completion, some word and grammatical error may remain.    Mario Davenport MD

## 2017-02-21 NOTE — NURSING NOTE
"Chief Complaint   Patient presents with     Arthritis     Patient states RA is not real bad, feet still hurt some. Pacemaker put in on 2/6/17, also has infection in lungs.       Initial Pulse 80  Temp 96.6  F (35.9  C) (Oral)  Ht 1.6 m (5' 3\")  Wt 71.3 kg (157 lb 3.2 oz)  SpO2 98%  BMI 27.85 kg/m2 Estimated body mass index is 27.85 kg/(m^2) as calculated from the following:    Height as of this encounter: 1.6 m (5' 3\").    Weight as of this encounter: 71.3 kg (157 lb 3.2 oz).  BP completed using cuff size:          RAPID3 (0-30) Cumulative Score            RAPID3 Weighted Score (divide #4 by 3 and that is the weighted score)           "

## 2017-02-21 NOTE — MR AVS SNAPSHOT
After Visit Summary   2/21/2017    Linda Spicer    MRN: [id number]           Patient Information     Date Of Birth          6/13/1931        Visit Information        Provider Department      2/21/2017 10:40 AM Mario Davenport MD [facility]        Today's Diagnoses     Rheumatoid arthritis involving multiple sites with positive rheumatoid factor (H)    -  1    High risk medication use           Follow-ups after your visit        Your next 10 appointments already scheduled     Feb 21, 2017 12:00 PM CST   [facility]    [address] [place] [address]   [phone]           Bring a current list of meds and any records pertaining to this visit.  For Physicals, please bring immunization records and any forms needing to be filled out.  Please arrive 10 minutes early to complete paperwork.            Feb 27, 2017  9:00 AM CST   [facility]   [facility]    [address] [facility] [address]   [phone]           Bring a current list of meds and any records pertaining to this visit.  For Physicals, please bring immunization records and any forms needing to be filled out.  Please arrive 10 minutes early to complete paperwork.            Mar 03, 2017  9:30 AM CST   Level O with [address] ([facility])    [address] [place] [address]   [phone]            Mar 06, 2017  3:50 PM CST   Return Visit with Emeterio Loza MD   [facility] ([facility])    [address] [place] [address]   [phone]            Mar 31, 2017  9:30 AM CDT   Level O with [address] ([facility])    [address] [place] [address]  [facility] [address]   [phone]            May 11, 2017  9:30 AM CDT   LAB with [facility] LAB   [facility]    [address] [facility] [address]   [phone]           Patient must bring picture ID.  Patient should be prepared to give a urine specimen  Please do not eat 10-12 hours before your appointment if you are coming in fasting for labs on lipids, cholesterol, or glucose (sugar).  Pregnant women should follow their Care Team instructions. Water with medications is okay. Do not drink coffee or other fluids.   If you have concerns about taking  your medications, please ask at office or if scheduling via [employer], send a message by clicking on Secure Messaging, Message Your Care Team.            May 15, 2017 12:30 PM CDT   [facility] Visit with Emeterio Loza MD   [facility]    [address] [place] [address]   [phone]            May 16, 2017  9:20 AM CDT   Return Visit with Mario Davenport MD   [facility] ([facility])    [address] [facility] [id number] [phone]              Future tests that were ordered for you today     Open Future Orders        Priority Expected Expires Ordered    CBC with platelets differential Routine 5/18/2017 6/6/2017 2/21/2017    Creatinine Routine 5/18/2017 6/6/2017 2/21/2017    CRP inflammation Routine 5/18/2017 6/6/2017 2/21/2017    Erythrocyte sedimentation rate auto Routine 5/18/2017 6/6/2017 2/21/2017    Hepatic panel Routine 5/18/2017 6/6/2017 2/21/2017            Who to contact     If you have questions or need follow up information about today's clinic visit or your schedule please contact [facility] directly at [phone].  Normal or non-critical lab and imaging results will be communicated to you by MyChart, letter or phone within 4 business days  "after the clinic has received the results. If you do not hear from us within 7 days, please contact the clinic through [employer] or phone. If you have a critical or abnormal lab result, we will notify you by phone as soon as possible.  Submit refill requests through [employer] or call your pharmacy and they will forward the refill request to us. Please allow 3 business days for your refill to be completed.          Additional Information About Your Visit        [facility] Information     [employer] gives you secure access to your electronic health record. If you see a primary care provider, you can also send messages to your care team and make appointments. If you have questions, please call your primary care clinic.  If you do not have a primary care provider, please call [phone] and they will assist you.        Care EveryWhere ID     This is your Care EveryWhere ID. This could be used by other organizations to access your [facility] medical records  [id number]        Your Vitals Were     Pulse Temperature Height Pulse Oximetry BMI (Body Mass Index)       80 96.6  F (35.9  C) (Oral) 5' 3\" (1.6 m) 98% 27.85 kg/m2        Blood Pressure from Last 3 Encounters:   02/21/17 150/72   02/16/17 130/70   02/13/17 185/78    Weight from Last 3 Encounters:   02/21/17 157 lb 3.2 oz (71.3 kg)   02/16/17 158 lb (71.7 kg)   02/03/17 159 lb 9.6 oz (72.4 kg)              We Performed the Following     M Tuberculosis by Quantiferon          Today's Medication Changes          These changes are accurate as of: 2/21/17 11:37 AM.  If you have any questions, ask your nurse or doctor.               Stop taking these medicines if you haven't already. Please contact your care team if you have questions.     certolizumab pegol 2 X 200 MG/ML Kit 2 syringes/kit   Commonly known as:  CIMZIA   Stopped by:  Mario Davenport MD                    Primary Care Provider Office Phone # Fax #    Tre Lockett [phone]       [place] " [facility] [address]  [facility] [address]        Thank you!     Thank you for choosing [facility]  for your care. Our goal is always to provide you with excellent care. Hearing back from our patients is one way we can continue to improve our services. Please take a few minutes to complete the written survey that you may receive in the mail after your visit with us. Thank you!             Your Updated Medication List - Protect others around you: Learn how to safely use, store and throw away your medicines at www.disposemymeds.org.          This list is accurate as of: 2/21/17 11:37 AM.  Always use your most recent med list.                   Brand Name Dispense Instructions for use    apixaban ANTICOAGULANT 2.5 MG tablet    ELIQUIS    60 tablet    Take 1 tablet (2.5 mg) by mouth 2 times daily       Blood Pressure Monitor Kit     1 kit    1 kit daily as needed       calcium-vitamin D 600-400 MG-UNIT per tablet    CALTRATE     Take 1 tablet by mouth 2 times daily       Carboxymethylcellulose Sodium 1 % Gel      1-2 drops (aka Genteal)       Cranberry 180 MG Caps      Take 1 capsule by mouth daily Unsure of strength       fish oil-omega-3 fatty acids 1000 MG capsule      Take 2 g by mouth daily. 2 capsules daily       folic acid 1 MG tablet    FOLVITE    100 tablet    Take 1 tablet (1 mg) by mouth daily       GENTEAL MILD OP      Apply  to eye daily.       IBANdronate 150 MG tablet    BONIVA    3 tablet    Take 1 tablet (150 mg) by mouth every 30 days       levofloxacin 250 MG tablet    LEVAQUIN    30 tablet    Take 1 tablet (250 mg) by mouth daily       levothyroxine 75 MCG tablet    SYNTHROID/LEVOTHROID    90 tablet    Take 1 tablet (75 mcg) by mouth daily       losartan 100 MG tablet    COZAAR    90 tablet    TAKE ONE TABLET BY MOUTH EVERY DAY FOR BLOOD PRESSURE       LUCENTIS IO      1 injection every 4 Weeks       metoprolol 25 MG 24 hr tablet    TOPROL-XL    45 tablet     Take 0.5 tablets (12.5 mg) by mouth daily       nitroglycerin 0.4 MG sublingual tablet    NITROSTAT    25 tablet    Place 1 tablet (0.4 mg) under the tongue every 5 minutes as needed for chest pain       omeprazole 20 MG CR capsule    priLOSEC    90 capsule    TAKE ONE CAPSULE BY MOUTH 30 MINUTES BEFORE BREAKFAST. DO NOT TAKE WITH LEVOTHYROXINE       * order for DME     1 Box    Equipment being ordered: Dispense face mask. Mrs. Spicer is immunosuppressed due to rheumatoid arthritis.       * order for DME     1 each    Equipment being ordered: Nebulizer. Use with Albuterol.       order for DME     1 each    Equipment being ordered: Dispense baffle, for use with nebulizer.       * order for DME     1 each    Equipment being ordered: Left wrist splint.       * order for DME     1 each    Equipment being ordered: Left wrist splint.       PRESERVISION AREDS PO      Take 2 tablets by mouth daily       REFRESH P.M. Oint      Apply  to eye. Daily at bedtime       saccharomyces boulardii 250 MG capsule    FLORASTOR     Take 250 mg by mouth       senna-docusate 8.6-50 MG per tablet    SENOKOT-S;PERICOLACE    120 tablet    Take 2 tablets by mouth At Bedtime Hold if diarrhea occurs.       SPIRONOLACTONE PO      Take 25 mg by mouth daily       triamcinolone 0.1 % cream    KENALOG    453.6 g    Apply sparingly to affected area on face three times daily.       ZYRTEC ALLERGY 10 MG tablet   Generic drug:  cetirizine      Take 10 mg by mouth At Bedtime       * Notice:  This list has 4 medication(s) that are the same as other medications prescribed for you. Read the directions carefully, and ask your doctor or other care provider to review them with you.

## 2017-02-21 NOTE — NURSING NOTE
"Chief Complaint   Patient presents with     Hospital F/U       Initial /70 (BP Location: Left arm, Patient Position: Chair, Cuff Size: Adult Regular)  Pulse 120  Temp 96.8  F (36  C) (Oral)  Ht 5' 3\" (1.6 m)  Wt 157 lb (71.2 kg)  SpO2 98%  BMI 27.81 kg/m2 Estimated body mass index is 27.81 kg/(m^2) as calculated from the following:    Height as of this encounter: 5' 3\" (1.6 m).    Weight as of this encounter: 157 lb (71.2 kg).  Medication Reconciliation: complete     Jorge Forrester MA      "

## 2017-02-21 NOTE — PATIENT INSTRUCTIONS
This summary includes the important diagnoses, test, medications and other important parts of your medical history.  Below are a few good we sites you can use to learn more about these.     Www.[employer].org : Up to date and easily searchable information on multiple topics.  Www.[employer].org/Pharmacy/c_539084.asp : [facility] $4.99 medications  Www.medlineplus.gov : medication info, interactive tutorials, watch real surgeries online  Www.familydoctor.org : good info from the Academy of Family Physicians  Www.[employer].com : good info from the [facility]  Www.cdc.gov : public health info, travel advisories, epidemics (H1N1)  Www.aap.org : children's health info, normal development, vaccinations  Www.health.[facility].[place].us : [place] dept of heat, public health issues in [place], N1N1    Based on your medical history and these are the current health maintenance or preventive care services that you are due for (some may have been done at this visit:)  There are no preventive care reminders to display for this patient.  =================================================================================  Normal Values   Blood pressure  <140/90 for most adults    <130/80 for some chronic diseases (ask your care team about yours)    BMI (body mass index)  18.5-25 kg/m2 (based on height and weight)     Thank you for visiting [facility]    Normal or non-critical lab and imaging results will be communicated to you by MyChart, letter or phone within 7 days.  If you do not hear from us within 10 days, please call the clinic. If you have a critical or abnormal lab result, we will notify you by phone as soon as possible.     If you have any questions regarding your visit please contact:     Team Comfort:   Clinic Hours Telephone Number   Dr. Aravind Novak   7am-5pm  Monday - Friday [phone]  [facility]   Pharmacy 8am-8pm  Monday-Thursday      8am-6pm Friday  9am-5pm Saturday-Sunday [phone]   Urgent Care 11am-8pm Monday-Friday        9am-5pm Saturday-Sunday [phone]     After hours, weekend or if you need to make an appointment with your primary provider please call [phone].   After Hours nurse advise: call [facility]: [phone]    Medication Refills:  Call your pharmacy and they will forward the refill to us. Please allow 3 business days for your refills to be completed.    Use [employer] (secure email communication and access to your chart) to send your primary care provider a message or make an appointment. Ask someone on your Team how to sign up for [employer]. To log on to [employer] or for more information in [employer] please visit the website at [email or web address].  As of October 8, 2013, all password changes, disabled accounts, or ID changes in [employer]/MyHealth will be done by our Access Services Department.   If you need help with your account or password, call: [phone]. Clinic staff no longer has the ability to change passwords.

## 2017-02-21 NOTE — PROGRESS NOTES
SUBJECTIVE:                                                    Linda Spicer is a 85 year old female who presents to clinic today for the following health issues:    ED/UC Followup:    Facility:  [facility]  Date of visit: 2-18-17  Reason for visit: lightheadedness  Current Status: better       Dizziness      Duration: less than one week.    Description (location/character/radiation): presyncope    Intensity:  moderate    Accompanying signs and symptoms: denies any fever, chest pain or palpitations.    History (similar episodes/previous evaluation): Holter does not show any sinus pauses.    Precipitating or alleviating factors: None    Therapies tried and outcome: Dizziness       Problem list and histories reviewed & adjusted, as indicated.  Additional history: as documented    Patient Active Problem List   Diagnosis     Advanced directives, counseling/discussion     Hypertension goal BP (blood pressure) < 150/90     Hypothyroid     Diffuse idiopathic pulmonary fibrosis (H)     Macular degeneration, left eye     Irritable bowel syndrome     Encounter for palliative care     Adjustment disorder with anxious mood     Mild anemia     DDD (degenerative disc disease), lumbar     CKD (chronic kidney disease) stage 3, GFR 30-59 ml/min     History of blood transfusion     Aftercare following surgery     S/P lumbar laminectomy     High risk medication use     Osteopenia     Atrophic vaginitis     Fecal incontinence     Female stress incontinence     Impingement syndrome of both shoulders     UIP (usual interstitial pneumonitis) (H)     High risk medications (not anticoagulants) long-term use     Heart failure with preserved ejection fraction (H)     Congestive heart failure with preserved LV function, NYHA class 3 (H)     Other specified hypothyroidism     Rheumatoid arthritis involving multiple sites with positive rheumatoid factor (H)     Health Care Home     Sick sinus syndrome (H)     Past Surgical History    Procedure Laterality Date     Biopsy       hemorrhoidectomy     Ent surgery       tonsillectomy     Appendectomy       Hysterectomy, pap no longer indicated       Gyn surgery       3 D & C's     Laminectomy lumbar one level N/A 10/13/2015     Procedure: LAMINECTOMY LUMBAR ONE LEVEL;  Surgeon: Fransico Toussaint MD;  Location:  OR       Social History   Substance Use Topics     Smoking status: Never Smoker     Smokeless tobacco: Never Used     Alcohol use Yes      Comment: rare wine      Family History   Problem Relation Age of Onset     Hypertension Mother      Psychotic Disorder Father      DIABETES Son      DIABETES Daughter      Blood Disease Daughter          Allergies   Allergen Reactions     Cephalexin Hcl Diarrhea     Gabapentin Other (See Comments)     Dizzsiness     Naproxen GI Disturbance     Perfume      Lactase Other (See Comments)     Macrobid [Nitrofurantoin Anhydrous]      Possibly related to lung disease      Sulfa Drugs      Throat swelling     Xanax [Alprazolam] Other (See Comments)     Dizziness      Recent Labs   Lab Test  02/16/17   0949  12/22/16   1506  10/18/16   1000   06/03/16   0756   05/22/14   0821   LDL   --    --    --    --   109*   --   97   HDL   --    --    --    --   47*   --   42*   TRIG   --    --    --    --   75   --   84   ALT  21  31  30   --   36   < >   --    CR  1.24*  1.22*  1.31*   < >  1.44*   < >  1.21*   GFRESTIMATED  41*  42*  39*   < >  35*   < >  43*   GFRESTBLACK  50*  51*  47*   < >  42*   < >  52*   POTASSIUM  4.7  4.8   --    < >  5.1   < >  4.1   TSH  0.93  0.83   --    < >   --    < >  0.58    < > = values in this interval not displayed.      BP Readings from Last 3 Encounters:   02/21/17 140/64   02/21/17 150/72   02/16/17 130/70    Wt Readings from Last 3 Encounters:   02/21/17 157 lb (71.2 kg)   02/21/17 157 lb 3.2 oz (71.3 kg)   02/16/17 158 lb (71.7 kg)                  Labs reviewed in EPIC  Problem list, Medication list, Allergies, and  "Medical/Social/Surgical histories reviewed in [facility] and updated as appropriate.    ROS:  C: NEGATIVE for fever, chills, change in weight  I: NEGATIVE for worrisome rashes, moles or lesions  E: NEGATIVE for vision changes or irritation  E/M: NEGATIVE for ear, mouth and throat problems  R: NEGATIVE for significant cough or SOB  CV: NEGATIVE for chest pain, palpitations or peripheral edema  GI: NEGATIVE for nausea, abdominal pain, heartburn, or change in bowel habits  : NEGATIVE for frequency, dysuria, or hematuria  M: NEGATIVE for significant arthralgias or myalgia  N: NEGATIVE for weakness, dizziness or paresthesias  E: NEGATIVE for temperature intolerance, skin/hair changes  H: NEGATIVE for bleeding problems  P: NEGATIVE for changes in mood or affect    OBJECTIVE:                                                    /64  Pulse 120  Temp 96.8  F (36  C) (Oral)  Ht 5' 3\" (1.6 m)  Wt 157 lb (71.2 kg)  SpO2 98%  BMI 27.81 kg/m2  Body mass index is 27.81 kg/(m^2).  GENERAL: healthy, alert and no distress  NECK: no adenopathy, no asymmetry, masses, or scars and thyroid normal to palpation  RESP: lungs clear to auscultation - no rales, rhonchi or wheezes  CV: regular rate and rhythm, normal S1 S2, no S3 or S4, no murmur, click or rub, no peripheral edema and peripheral pulses strong  ABDOMEN: soft, nontender, no hepatosplenomegaly, no masses and bowel sounds normal  MS: no gross musculoskeletal defects noted, no edema  SKIN: no suspicious lesions or rashes  NEURO: Normal strength and tone, mentation intact and speech normal  BACK: no CVA tenderness, no paralumbar tenderness  PSYCH: mentation appears normal, affect normal/bright    Diagnostic Test Results:  Pending.     ASSESSMENT/PLAN:                                                          (I95.1) Orthostasis  (primary encounter diagnosis)  Comment: Etiology of her dizziness, most likely triggered by polypharmacy.  Plan:Change Losartan to bedtime.     (I48.2) " Chronic atrial fibrillation (H)  Comment: Anticoagulated with Warfarin.  Plan: Continue Eliquis for rate control. Restart Toprol XL 12.5 mg once a day and D/C Metoprolol Tartrate.    (I10) Hypertension goal BP (blood pressure) < 150/90  Comment: At goal with current regimen.  Plan: Continue Losartan but switch to bedtime dose. Restart Toprol XL 12.5 mg every morning. Take spironolactone 25 mg every morning.    (Z95.0) Cardiac pacemaker in situ  Comment: Swelling noted at pacemaker site.  Plan: Possibly hematoma since patient keep takes Eliquis.    Follow-up visit if condition worsens.    Tre Lockett MD  [facility]

## 2017-02-21 NOTE — MR AVS SNAPSHOT
After Visit Summary   2/21/2017    Linda Spicer    MRN: [id number]           Patient Information     Date Of Birth          6/13/1931        Visit Information        Provider Department      2/21/2017 12:00 PM Tre Lockett MD [facility]        Today's Diagnoses     Orthostasis    -  1    Chronic atrial fibrillation (H)        Hypertension goal BP (blood pressure) < 150/90        Cardiac pacemaker in situ          Care Instructions    This summary includes the important diagnoses, test, medications and other important parts of your medical history.  Below are a few good we sites you can use to learn more about these.     Www.ExecOnline.Diaspora : Up to date and easily searchable information on multiple topics.  Www.[facility] Hospital[facility]oderopedia.Diaspora/Pharmacy/c_539084.asp : [place] [facility] $4.99 medications  Www.Apolo Energia.gov : medication info, interactive tutorials, watch real surgeries online  Www.familydoctor.org : good info from the Academy of Family Physicians  Www.Regent Education.BlackBamboozStudio : good info from the [facility]  Www.cdc.gov : public health info, travel advisories, epidemics (H1N1)  Www.aap.org : children's health info, normal development, vaccinations  Www.health.[facility].[place].us : [place] dept of heatlh, public health issues in [place], N1N1    Based on your medical history and these are the current health maintenance or preventive care services that you are due for (some may have been done at this visit:)  There are no preventive care reminders to display for this patient.  =================================================================================  Normal Values   Blood pressure  <140/90 for most adults    <130/80 for some chronic diseases (ask your care team about yours)    BMI (body mass index)  18.5-25 kg/m2 (based on height and weight)     Thank you for visiting [facility]    Normal or non-critical lab and imaging results will be communicated to you by MyChart, letter or  phone within 7 days.  If you do not hear from us within 10 days, please call the clinic. If you have a critical or abnormal lab result, we will notify you by phone as soon as possible.     If you have any questions regarding your visit please contact:     Team Comfort:   Clinic Hours Telephone Number   Dr. Aravind Mooney  [facility]   7am-5pm  Monday - Friday [phone]  Viktor PAZ   Pharmacy 8am-8pm Monday-Thursday      8am-6pm Friday  9am-5pm Saturday-Sunday [phone]   Urgent Care 11am-8pm Monday-Friday        9am-5pm Saturday-Sunday [phone]     After hours, weekend or if you need to make an appointment with your primary provider please call [phone].   After Hours nurse advise: call [facility]: [phone]    Medication Refills:  Call your pharmacy and they will forward the refill to us. Please allow 3 business days for your refills to be completed.    Use [employer] (secure email communication and access to your chart) to send your primary care provider a message or make an appointment. Ask someone on your Team how to sign up for [employer]. To log on to CoverMe or for more information in Net 263 please visit the website at [email or web address].  As of October 8, 2013, all password changes, disabled accounts, or ID changes in [employer]/MyHealth will be done by our Access Services Department.   If you need help with your account or password, call: [phone]. Clinic staff no longer has the ability to change passwords.           Follow-ups after your visit        Your next 10 appointments already scheduled     Feb 27, 2017  9:00 AM CST   Office Visit with Tre Lockett MD   [facility]    [address] [place] [address] [phone]           Bring a current list of meds and any records pertaining to this visit.  For Physicals, please  bring immunization records and any forms needing to be filled out.  Please arrive 10 minutes early to complete paperwork.            Mar 03, 2017  9:30 AM CST   Level O with BAY 4 INFUSION   [facility]    [address] [place] [address]   [phone]            Mar 06, 2017  3:50 PM CST   Return Visit with Emeterio Loza MD   [facility])    [address] [place] [address]   [phone]            Mar 17, 2017  9:30 AM CDT   Level O with BAY 6 INFUSION   [facility])    [address] [place] [address]   [phone]            Mar 31, 2017  9:30 AM CDT   Level O with BAY 4 INFUSION   [facility] ([facility])    [address] [place] [address]   [phone]            May 11, 2017  9:30 AM CDT   LAB with BK LAB   [facility]    [address] [place] [address] [phone]           Patient must bring picture ID.  Patient should be prepared to give a urine specimen  Please do not eat 10-12 hours before your appointment if you are coming in fasting for labs on lipids, cholesterol, or glucose (sugar).  Pregnant women should follow their Care Team instructions. Water with medications is okay. Do not drink coffee or other fluids.   If you have concerns about taking  your medications, please ask at office or if scheduling via [facility], send a message by clicking on Secure Messaging, Message Your Care Team.            May 15, 2017 12:30 PM CDT   Return Visit with Emeterio Loza MD   [facility])    [address] [place] [address]   [phone]            May 16, 2017  9:20 AM CDT   Return Visit with Mario Davenport MD   [facility]  [place] ([facility])    [address] [facility] [address]   [phone]              Future tests that were ordered for you today     Open Future Orders        Priority Expected Expires Ordered    CBC with platelets differential Routine 5/18/2017 6/6/2017 2/21/2017    Creatinine Routine 5/18/2017 6/6/2017 2/21/2017    CRP inflammation Routine 5/18/2017 6/6/2017 2/21/2017    Erythrocyte sedimentation rate auto Routine 5/18/2017 6/6/2017 2/21/2017    Hepatic panel Routine 5/18/2017 6/6/2017 2/21/2017            Who to contact     If you have questions or need follow up information about today's clinic visit or your schedule please contact [facility] directly at [phone].  Normal or non-critical lab and imaging results will be communicated to you by MyChart, letter or phone within 4 business days after the clinic has received the results. If you do not hear from us within 7 days, please contact the clinic through PeerPonghart or phone. If you have a critical or abnormal lab result, we will notify you by phone as soon as possible.  Submit refill requests through misterbnb or call your pharmacy and they will forward the refill request to us. Please allow 3 business days for your refill to be completed.          Additional Information About Your Visit        PeerPonghart Information     misterbnb gives you secure access to your electronic health record. If you see a primary care provider, you can also send messages to your care team and make appointments. If you have questions, please call your primary care clinic.  If you do not have a primary care provider, please call [phone] and they will assist you.        Care EveryWhere ID     This is your Care EveryWhere ID. This could be used by other organizations to access your [place] medical records  [id number]        Your Vitals Were     Pulse Temperature Height Pulse Oximetry BMI (Body Mass Index)       120 96.8  F (36  " C) (Oral) 5' 3\" (1.6 m) 98% 27.81 kg/m2        Blood Pressure from Last 3 Encounters:   02/21/17 140/64   02/21/17 150/72   02/16/17 130/70    Weight from Last 3 Encounters:   02/21/17 157 lb (71.2 kg)   02/21/17 157 lb 3.2 oz (71.3 kg)   02/16/17 158 lb (71.7 kg)              Today, you had the following     No orders found for display         Today's Medication Changes          These changes are accurate as of: 2/21/17 12:57 PM.  If you have any questions, ask your nurse or doctor.               These medicines have changed or have updated prescriptions.        Dose/Directions    LOSARTAN POTASSIUM PO   This may have changed:  Another medication with the same name was removed. Continue taking this medication, and follow the directions you see here.   Changed by:  Tre Lockett MD        Dose:  25 mg   Take 25 mg by mouth daily (with dinner) Hold if SBP less than 110.   Refills:  0       metoprolol 25 MG 24 hr tablet   Commonly known as:  TOPROL-XL   This may have changed:    - when to take this  - additional instructions   Used for:  Paroxysmal atrial flutter (H)        Dose:  12.5 mg   Take 0.5 tablets (12.5 mg) by mouth daily   Quantity:  45 tablet   Refills:  6         Stop taking these medicines if you haven't already. Please contact your care team if you have questions.     certolizumab pegol 2 X 200 MG/ML Kit 2 syringes/kit   Commonly known as:  CIMZIA   Stopped by:  Mario Davenport MD                    Primary Care Provider Office Phone # Fax #    Tre Lockett [phone]       [facility] [address]  [facility] [address]        Thank you!     Thank you for choosing [facility]  for your care. Our goal is always to provide you with excellent care. Hearing back from our patients is one way we can continue to improve our services. Please take a few minutes to complete the written survey that you may receive in the mail after your " visit with us. Thank you!             Your Updated Medication List - Protect others around you: Learn how to safely use, store and throw away your medicines at www.disposemymeds.org.          This list is accurate as of: 2/21/17 12:57 PM.  Always use your most recent med list.                   Brand Name Dispense Instructions for use    apixaban ANTICOAGULANT 2.5 MG tablet    ELIQUIS    60 tablet    Take 1 tablet (2.5 mg) by mouth 2 times daily       Blood Pressure Monitor Kit     1 kit    1 kit daily as needed       calcium-vitamin D 600-400 MG-UNIT per tablet    CALTRATE     Take 1 tablet by mouth 2 times daily       Carboxymethylcellulose Sodium 1 % Gel      1-2 drops (aka Genteal)       Cranberry 180 MG Caps      Take 1 capsule by mouth daily Unsure of strength       fish oil-omega-3 fatty acids 1000 MG capsule      Take 2 g by mouth daily. 2 capsules daily       folic acid 1 MG tablet    FOLVITE    100 tablet    Take 1 tablet (1 mg) by mouth daily       GENTEAL MILD OP      Apply  to eye daily.       IBANdronate 150 MG tablet    BONIVA    3 tablet    Take 1 tablet (150 mg) by mouth every 30 days       levofloxacin 250 MG tablet    LEVAQUIN    30 tablet    Take 1 tablet (250 mg) by mouth daily       levothyroxine 75 MCG tablet    SYNTHROID/LEVOTHROID    90 tablet    Take 1 tablet (75 mcg) by mouth daily       LOSARTAN POTASSIUM PO      Take 25 mg by mouth daily (with dinner) Hold if SBP less than 110.       LUCENTIS IO      1 injection every 4 Weeks       metoprolol 25 MG 24 hr tablet    TOPROL-XL    45 tablet    Take 0.5 tablets (12.5 mg) by mouth daily       nitroglycerin 0.4 MG sublingual tablet    NITROSTAT    25 tablet    Place 1 tablet (0.4 mg) under the tongue every 5 minutes as needed for chest pain       omeprazole 20 MG CR capsule    priLOSEC    90 capsule    TAKE ONE CAPSULE BY MOUTH 30 MINUTES BEFORE BREAKFAST. DO NOT TAKE WITH LEVOTHYROXINE       * order for DME     1 Box    Equipment being  ordered: Dispense face mask. Mrs. Spicer is immunosuppressed due to rheumatoid arthritis.       * order for DME     1 each    Equipment being ordered: Nebulizer. Use with Albuterol.       order for DME     1 each    Equipment being ordered: Dispense baffle, for use with nebulizer.       * order for DME     1 each    Equipment being ordered: Left wrist splint.       * order for DME     1 each    Equipment being ordered: Left wrist splint.       PRESERVISION AREDS PO      Take 2 tablets by mouth daily       REFRESH P.M. Oint      Apply  to eye. Daily at bedtime       saccharomyces boulardii 250 MG capsule    FLORASTOR     Take 250 mg by mouth       senna-docusate 8.6-50 MG per tablet    SENOKOT-S;PERICOLACE    120 tablet    Take 2 tablets by mouth At Bedtime Hold if diarrhea occurs.       SPIRONOLACTONE PO      Take 25 mg by mouth every morning Hold if SBP is less than 120.       triamcinolone 0.1 % cream    KENALOG    453.6 g    Apply sparingly to affected area on face three times daily.       ZYRTEC ALLERGY 10 MG tablet   Generic drug:  cetirizine      Take 10 mg by mouth At Bedtime       * Notice:  This list has 4 medication(s) that are the same as other medications prescribed for you. Read the directions carefully, and ask your doctor or other care provider to review them with you.

## 2017-02-22 VITALS
WEIGHT: 157 LBS | DIASTOLIC BLOOD PRESSURE: 70 MMHG | HEIGHT: 63 IN | SYSTOLIC BLOOD PRESSURE: 130 MMHG | BODY MASS INDEX: 27.82 KG/M2 | OXYGEN SATURATION: 98 % | HEART RATE: 120 BPM | TEMPERATURE: 96.8 F

## 2017-02-23 LAB
M TB TUBERC IFN-G BLD QL: NEGATIVE
M TB TUBERC IFN-G/MITOGEN IGNF BLD: 0.19 IU/ML

## 2017-03-03 ENCOUNTER — TELEPHONE (OUTPATIENT)
Dept: FAMILY MEDICINE | Facility: CLINIC | Age: 82
End: 2017-03-03

## 2017-03-03 ENCOUNTER — INFUSION THERAPY VISIT (OUTPATIENT)
Dept: INFUSION THERAPY | Facility: CLINIC | Age: 82
End: 2017-03-03
Payer: MEDICARE

## 2017-03-03 VITALS
SYSTOLIC BLOOD PRESSURE: 122 MMHG | DIASTOLIC BLOOD PRESSURE: 59 MMHG | HEART RATE: 63 BPM | BODY MASS INDEX: 28.06 KG/M2 | TEMPERATURE: 98 F | RESPIRATION RATE: 18 BRPM | OXYGEN SATURATION: 92 % | WEIGHT: 158.4 LBS

## 2017-03-03 DIAGNOSIS — I10 HYPERTENSION GOAL BP (BLOOD PRESSURE) < 140/90: Primary | ICD-10-CM

## 2017-03-03 DIAGNOSIS — M05.79 RHEUMATOID ARTHRITIS INVOLVING MULTIPLE SITES WITH POSITIVE RHEUMATOID FACTOR (H): Primary | ICD-10-CM

## 2017-03-03 PROCEDURE — 96365 THER/PROPH/DIAG IV INF INIT: CPT | Performed by: INTERNAL MEDICINE

## 2017-03-03 PROCEDURE — 99207 ZZC NO CHARGE LOS: CPT

## 2017-03-03 RX ADMIN — Medication 250 ML: at 10:01

## 2017-03-03 ASSESSMENT — PAIN SCALES - GENERAL: PAINLEVEL: NO PAIN (0)

## 2017-03-03 NOTE — TELEPHONE ENCOUNTER
Losartan 25mg      Last Written Prescription Date:  2/7/17  Last Fill Quantity: 30,   # refills: 0  Last Office Visit with G, P or [facility] prescribing provider: 2/21/17  Future Office visit:    Next 5 appointments (look out 90 days)     Mar 06, 2017  3:50 PM CST   Return Visit with Emeterio Loza MD   [facility])    [address] [place] [address]   [phone]            Mar 09, 2017  9:00 AM CST   Office Visit with PFT LAB   [facility])    [address] [place] [address]   [phone]            Mar 14, 2017  9:00 AM CDT   Return Visit with Sandra Comer MD   [facility])    [address] [place] [address]   [phone]            May 15, 2017 12:30 PM CDT   Return Visit with Emeterio Loza MD   [facility] ([facility])    [address] [place] [address]   [phone]            May 16, 2017  9:20 AM CDT   [facility] with [facility]   [facility]    [address] [facility] [address]   [phone]                   Routing refill request to provider for review/approval because:  Medication is reported/historical  Thank you very kindly!  Niurka Olivares CPhT  [facility] [place]

## 2017-03-03 NOTE — MR AVS SNAPSHOT
After Visit Summary   3/3/2017    Linda Spicer    MRN: [id number]           Patient Information     Date Of Birth          6/13/1931        Visit Information        Provider Department      3/3/2017 9:30 AM BAY 4 INFUSION [facility]        Today's Diagnoses     Rheumatoid arthritis involving multiple sites with positive rheumatoid factor (H)    -  1       Follow-ups after your visit        Your next 10 appointments already scheduled     Mar 06, 2017  3:50 PM CST   Return Visit with Emeterio Loza MD   [facility])    [address] [place] [address]   [phone]            Mar 09, 2017  9:00 AM CST   Office Visit with PFT LAB   [facility])    [address] [place] [address]   [phone]           Bring a current list of meds and any records pertaining to this visit.  For Physicals, please bring immunization records and any forms needing to be filled out.  Please arrive 10 minutes early to complete paperwork.            Mar 14, 2017  9:00 AM CDT   [facility]   [facility]    [address] [facility] [address]   [phone]            Mar 17, 2017  9:30 AM CDT   Level O with BAY 6 INFUSION   [facility]    [address] [facility] [address]   [phone]            Mar 31, 2017  9:30 AM CDT   Level O with BAY 4 INFUSION   [facility]    [address] [place] [address]   [phone]            May 11, 2017  9:30 AM CDT   LAB with BK LAB   [facility]    [address] [place] [id number] [phone]           Patient must bring picture ID.  Patient should be prepared  to give a urine specimen  Please do not eat 10-12 hours before your appointment if you are coming in fasting for labs on lipids, cholesterol, or glucose (sugar).  Pregnant women should follow their Care Team instructions. Water with medications is okay. Do not drink coffee or other fluids.   If you have concerns about taking  your medications, please ask at office or if scheduling via Yassets, send a message by clicking on Secure Messaging, Message Your Care Team.            May 15, 2017 12:30 PM CDT   [facility]   [facility]    [address] [place] [address]   [phone]            May 16, 2017  9:20 AM CDT   [facility] with [facility]   [facility]    [address] [facility] [address] [phone]              Who to contact     If you have questions or need follow up information about today's clinic visit or your schedule please contact [facility] directly at [phone].  Normal or non-critical lab and imaging results will be communicated to you by TapMetricshart, letter or phone within 4 business days after the clinic has received the results. If you do not hear from us within 7 days, please contact the clinic through TapMetricshart or phone. If you have a critical or abnormal lab result, we will notify you by phone as soon as possible.  Submit refill requests through Yassets or call your pharmacy and they will forward the refill request to us. Please allow 3 business days for your refill to be completed.          Additional Information About Your Visit        TapMetricshart Information     Yassets gives you secure access to your electronic health record. If you see a primary care provider, you can also send messages to your care team and make appointments. If you have questions, please call your primary care clinic.  If you do not have a  primary care provider, please call [phone] and they will assist you.      INVOLTA is an electronic gateway that provides easy, online access to your medical records. With INVOLTA, you can request a clinic appointment, read your test results, renew a prescription or communicate with your care team.     To access your existing account, please contact your [facility] or call [phone] for assistance.        Care EveryWhere ID     This is your Care EveryWhere ID. This could be used by other organizations to access your [place] medical records  [id number]        Your Vitals Were     Pulse Temperature Respirations Pulse Oximetry BMI (Body Mass Index)       63 98  F (36.7  C) (Oral) 18 92% 28.06 kg/m2        Blood Pressure from Last 3 Encounters:   03/03/17 122/59   02/21/17 130/70   02/21/17 150/72    Weight from Last 3 Encounters:   03/03/17 71.8 kg (158 lb 6.4 oz)   02/21/17 71.2 kg (157 lb)   02/21/17 71.3 kg (157 lb 3.2 oz)              We Performed the Following     Treatment Conditions     Treatment Conditions          Today's Medication Changes          These changes are accurate as of: 3/3/17 11:22 AM.  If you have any questions, ask your nurse or doctor.               These medicines have changed or have updated prescriptions.        Dose/Directions    metoprolol 25 MG 24 hr tablet   Commonly known as:  TOPROL-XL   This may have changed:    - when to take this  - additional instructions   Used for:  Paroxysmal atrial flutter (H)        Dose:  12.5 mg   Take 0.5 tablets (12.5 mg) by mouth daily   Quantity:  45 tablet   Refills:  6                Primary Care Provider Office Phone # Fax #    Tre Lockett [phone]       [facility] [address] [place]  [facility] [address]        Thank you!     Thank you for choosing [facility]  for your care. Our goal is always to provide you with excellent care. Hearing back  from our patients is one way we can continue to improve our services. Please take a few minutes to complete the written survey that you may receive in the mail after your visit with us. Thank you!             Your Updated Medication List - Protect others around you: Learn how to safely use, store and throw away your medicines at www.disposemymeds.org.          This list is accurate as of: 3/3/17 11:22 AM.  Always use your most recent med list.                   Brand Name Dispense Instructions for use    apixaban ANTICOAGULANT 2.5 MG tablet    ELIQUIS    60 tablet    Take 1 tablet (2.5 mg) by mouth 2 times daily       Blood Pressure Monitor Kit     1 kit    1 kit daily as needed       calcium-vitamin D 600-400 MG-UNIT per tablet    CALTRATE     Take 1 tablet by mouth 2 times daily       Carboxymethylcellulose Sodium 1 % Gel      1-2 drops (aka Genteal)       Cranberry 180 MG Caps      Take 1 capsule by mouth daily Unsure of strength       fish oil-omega-3 fatty acids 1000 MG capsule      Take 2 g by mouth daily. 2 capsules daily       folic acid 1 MG tablet    FOLVITE    100 tablet    Take 1 tablet (1 mg) by mouth daily       GENTEAL MILD OP      Apply  to eye daily.       IBANdronate 150 MG tablet    BONIVA    3 tablet    Take 1 tablet (150 mg) by mouth every 30 days       levofloxacin 250 MG tablet    LEVAQUIN    30 tablet    Take 1 tablet (250 mg) by mouth daily       levothyroxine 75 MCG tablet    SYNTHROID/LEVOTHROID    90 tablet    Take 1 tablet (75 mcg) by mouth daily       LOSARTAN POTASSIUM PO      Take 25 mg by mouth daily (with dinner) Hold if SBP less than 110.       LUCENTIS IO      1 injection every 4 Weeks       metoprolol 25 MG 24 hr tablet    TOPROL-XL    45 tablet    Take 0.5 tablets (12.5 mg) by mouth daily       nitroglycerin 0.4 MG sublingual tablet    NITROSTAT    25 tablet    Place 1 tablet (0.4 mg) under the tongue every 5 minutes as needed for chest pain       omeprazole 20 MG CR capsule     priLOSEC    90 capsule    TAKE ONE CAPSULE BY MOUTH 30 MINUTES BEFORE BREAKFAST. DO NOT TAKE WITH LEVOTHYROXINE       * order for DME     1 Box    Equipment being ordered: Dispense face mask. Mrs. Spicer is immunosuppressed due to rheumatoid arthritis.       * order for DME     1 each    Equipment being ordered: Nebulizer. Use with Albuterol.       order for DME     1 each    Equipment being ordered: Dispense baffle, for use with nebulizer.       * order for DME     1 each    Equipment being ordered: Left wrist splint.       * order for DME     1 each    Equipment being ordered: Left wrist splint.       PRESERVISION AREDS PO      Take 2 tablets by mouth daily       REFRESH P.M. Oint      Apply  to eye. Daily at bedtime       saccharomyces boulardii 250 MG capsule    FLORASTOR     Take 250 mg by mouth       senna-docusate 8.6-50 MG per tablet    SENOKOT-S;PERICOLACE    120 tablet    Take 2 tablets by mouth At Bedtime Hold if diarrhea occurs.       SPIRONOLACTONE PO      Take 25 mg by mouth every morning Hold if SBP is less than 120.       triamcinolone 0.1 % cream    KENALOG    453.6 g    Apply sparingly to affected area on face three times daily.       ZYRTEC ALLERGY 10 MG tablet   Generic drug:  cetirizine      Take 10 mg by mouth At Bedtime       * Notice:  This list has 4 medication(s) that are the same as other medications prescribed for you. Read the directions carefully, and ask your doctor or other care provider to review them with you.

## 2017-03-03 NOTE — PROGRESS NOTES
"Infusion Nursing Note:  Linda Spicer presents today for Orencia.    Patient seen by provider today: No   present during visit today: Not Applicable.    Note: Pharmacist met with patient for education on Orencia. Patient was provided a drug information handout on Orencia.    Intravenous Access:  Peripheral IV placed.    Treatment Conditions:  Rheumatology Infusion Checklist: PRIOR TO INFUSION OF BIOLOGICAL MEDICATIONS OR ANY OF THESE AS LISTED: Orencia (abatacept) \".rheumbiologicalchecklist\"    Prior to Infusion of biological medications or any of these as listed:    1. Elevated temperature, fever, chills, productive cough or abnormal vital signs, night sweats, coughing up blood or sputum, no appetite or abnormal vital signs : NO    2. Open wounds or new incisions: NO    3. Recent hospitalization: NO    4.  Recent surgeries:  NO    5. Any upcoming surgeries or dental procedures?:NO    6. Any current or recent bouts of illness or infection? On any antibiotics? : YES, on Levofloxacin for lung infection. Dr. Davenport aware.    7. Any new, sudden or worsening abdominal pain :NO    8. Vaccination within 4 weeks? Patient or someone in the household is scheduled to receive vaccination? No live virus vaccines prior to or during treatment :NO    9. Any nervous system diseases [i.e. multiple sclerosis, Guillain-Brutus, seizures, neurological  changes]: NO    10. Pregnant or breast feeding; or plans on pregnancy in the future: NO    11. Signs of worsening depression or suicidal ideations while taking benlysta:NO    12. New-onset medical symptoms: NO    13.  New cancer diagnosis or on chemotherapy or radiation NO    14.  Evaluate for any sign of active TB [Unexplained weight loss, Loss of appetite, Night sweats, Fever, Fatigue, Chills, Coughing for 3 weeks or longer, Hemoptysis (coughing up blood), Chest pain]: NO    **Note: If answered yes to any of the above, hold the infusion and contact ordering rheumatologist " or on-call rheumatologist.   .      Post Infusion Assessment:  Patient tolerated infusion without incident.  Blood return noted pre and post infusion.  Site patent and intact, free from redness, edema or discomfort.  Access discontinued per protocol.    Discharge Plan:   AVS to patient via MYCHART.  Patient will return 3/17/17 for next appointment.   Patient discharged in stable condition accompanied by: daughter.  Departure Mode: Ambulatory.    Trinity Villalpando RN

## 2017-03-05 RX ORDER — LOSARTAN POTASSIUM 25 MG/1
25 TABLET ORAL
Qty: 90 TABLET | Refills: 3 | Status: SHIPPED | OUTPATIENT
Start: 2017-03-05 | End: 2017-04-20

## 2017-03-06 ENCOUNTER — OFFICE VISIT (OUTPATIENT)
Dept: CARDIOLOGY | Facility: CLINIC | Age: 82
End: 2017-03-06
Payer: MEDICARE

## 2017-03-06 VITALS
OXYGEN SATURATION: 99 % | WEIGHT: 154 LBS | HEART RATE: 61 BPM | BODY MASS INDEX: 27.28 KG/M2 | DIASTOLIC BLOOD PRESSURE: 61 MMHG | SYSTOLIC BLOOD PRESSURE: 126 MMHG

## 2017-03-06 DIAGNOSIS — I50.22 CHRONIC SYSTOLIC CONGESTIVE HEART FAILURE (H): Primary | ICD-10-CM

## 2017-03-06 PROCEDURE — 99214 OFFICE O/P EST MOD 30 MIN: CPT | Performed by: INTERNAL MEDICINE

## 2017-03-06 NOTE — MR AVS SNAPSHOT
After Visit Summary   3/6/2017    Linda Spicer    MRN: [id number]           Patient Information     Date Of Birth          6/13/1931        Visit Information        Provider Department      3/6/2017 3:50 PM Emeterio Loza MD [facility]        Care Instructions      The following is a summary of your office visit:    Medications started today:none    Medications stopped today:none    Medication dose change: none    Nurse contact information: Stacey Perez RN  Cardiology Care Coordinator  [phone] Phone  [phone] Fax    Appointments made today: 6 month follow up    Patient instructions:1. Stacey will check when you need your next device check at [place]. 2. If your top BP number is almost always over 140, call Stacey        If you have had any blood work, imaging or other testing completed we will be in touch within 1-2 weeks regarding the results. If you have any questions, concerns or need to schedule a follow up, please contact us at [phone]. If you are needing refills please contact your pharmacy. For urgent after hour care please call the [facility] at [phone] or the [facility] at [phone] and ask to speak to the cardiologist on call.    It was a pleasure meeting with you today. Please let us know if there is anything else we can do for you so that we can be sure you are leaving completely satisfied with your care experience.     Your Cardiology Team at [facility]  RN Care Coordinator: Stacey Sebastian                  Follow-ups after your visit        Follow-up notes from your care team     Return in about 6 months (around 9/6/2017).      Your next 10 appointments already scheduled     Mar 09, 2017  9:00 AM CST   Office Visit with PFT LAB   [facility]    [address] [place] [id number] [phone]           Bring a  current list of meds and any records pertaining to this visit.  For Physicals, please bring immunization records and any forms needing to be filled out.  Please arrive 10 minutes early to complete paperwork.            Mar 14, 2017  9:00 AM CDT   Return Visit with Sandra Comer MD   [facility]    [address] [place] [address]   [phone]            Mar 17, 2017  9:30 AM CDT   Level O with BAY 6 INFUSION   [facility] ([facility])    [address] [place] [address]   [phone]            Mar 31, 2017  9:30 AM CDT   Level O with BAY 4 INFUSION   [facility] ([facility])    [address] [place] [address]   [phone]            May 11, 2017  9:30 AM CDT   LAB with BK LAB   [facility]    [address] [facility] [id number] [phone]           Patient must bring picture ID.  Patient should be prepared to give a urine specimen  Please do not eat 10-12 hours before your appointment if you are coming in fasting for labs on lipids, cholesterol, or glucose (sugar).  Pregnant women should follow their Care Team instructions. Water with medications is okay. Do not drink coffee or other fluids.   If you have concerns about taking  your medications, please ask at office or if scheduling via [facility], send a message by clicking on Secure Messaging, Message Your Care Team.            May 16, 2017  9:20 AM CDT   Return Visit with Mario Davenport MD   [facility]    [address] [facility] [id number] [phone]            Sep 11, 2017  1:10 PM CDT   Return Visit with Emeterio Loza MD   [facility] ([facility]    [address] [place] [address]    [phone]              Who to contact     If you have questions or need follow up information about today's clinic visit or your schedule please contact [facility] directly at [phone].  Normal or non-critical lab and imaging results will be communicated to you by MyChart, letter or phone within 4 business days after the clinic has received the results. If you do not hear from us within 7 days, please contact the clinic through MyChart or phone. If you have a critical or abnormal lab result, we will notify you by phone as soon as possible.  Submit refill requests through [employer] or call your pharmacy and they will forward the refill request to us. Please allow 3 business days for your refill to be completed.          Additional Information About Your Visit        [employer] Information     [employer] gives you secure access to your electronic health record. If you see a primary care provider, you can also send messages to your care team and make appointments. If you have questions, please call your primary care clinic.  If you do not have a primary care provider, please call [phone] and they will assist you.      [employer] is an electronic gateway that provides easy, online access to your medical records. With [employer], you can request a clinic appointment, read your test results, renew a prescription or communicate with your care team.     To access your existing account, please contact your [facility] or call [phone] for assistance.        Care EveryWhere ID     This is your Care EveryWhere ID. This could be used by other organizations to access your [facility] medical records  [id number]        Your Vitals Were     Pulse Pulse Oximetry BMI (Body Mass Index)             61 99% 27.28 kg/m2          Blood Pressure from Last 3 Encounters:   03/06/17 126/61   03/03/17 122/59   02/21/17 130/70    Weight from Last 3 Encounters:   03/06/17 154 lb (69.9 kg)    03/03/17 158 lb 6.4 oz (71.8 kg)   02/21/17 157 lb (71.2 kg)              Today, you had the following     No orders found for display         Today's Medication Changes          These changes are accurate as of: 3/6/17  4:34 PM.  If you have any questions, ask your nurse or doctor.               These medicines have changed or have updated prescriptions.        Dose/Directions    metoprolol 25 MG 24 hr tablet   Commonly known as:  TOPROL-XL   This may have changed:    - when to take this  - additional instructions   Used for:  Paroxysmal atrial flutter (H)        Dose:  12.5 mg   Take 0.5 tablets (12.5 mg) by mouth daily   Quantity:  45 tablet   Refills:  6                Primary Care Provider Office Phone # Fax #    Tre Lockett [phone]       [facility] [address] [facility] [address]        Thank you!     Thank you for choosing [facility]  for your care. Our goal is always to provide you with excellent care. Hearing back from our patients is one way we can continue to improve our services. Please take a few minutes to complete the written survey that you may receive in the mail after your visit with us. Thank you!             Your Updated Medication List - Protect others around you: Learn how to safely use, store and throw away your medicines at www.disposemymeds.org.          This list is accurate as of: 3/6/17  4:34 PM.  Always use your most recent med list.                   Brand Name Dispense Instructions for use    apixaban ANTICOAGULANT 2.5 MG tablet    ELIQUIS    60 tablet    Take 1 tablet (2.5 mg) by mouth 2 times daily       Blood Pressure Monitor Kit     1 kit    1 kit daily as needed       calcium-vitamin D 600-400 MG-UNIT per tablet    CALTRATE     Take 1 tablet by mouth 2 times daily       Carboxymethylcellulose Sodium 1 % Gel      1-2 drops (aka Genteal)       Cranberry 180 MG Caps      Take 1 capsule by mouth daily  Unsure of strength       fish oil-omega-3 fatty acids 1000 MG capsule      Take 2 g by mouth daily. 2 capsules daily       folic acid 1 MG tablet    FOLVITE    100 tablet    Take 1 tablet (1 mg) by mouth daily       GENTEAL MILD OP      Apply  to eye daily.       IBANdronate 150 MG tablet    BONIVA    3 tablet    Take 1 tablet (150 mg) by mouth every 30 days       levofloxacin 250 MG tablet    LEVAQUIN    30 tablet    Take 1 tablet (250 mg) by mouth daily       levothyroxine 75 MCG tablet    SYNTHROID/LEVOTHROID    90 tablet    Take 1 tablet (75 mcg) by mouth daily       losartan 25 MG tablet    COZAAR    90 tablet    Take 1 tablet (25 mg) by mouth daily (with dinner) Hold if SBP less than 110.       LUCENTIS IO      1 injection every 4 Weeks       metoprolol 25 MG 24 hr tablet    TOPROL-XL    45 tablet    Take 0.5 tablets (12.5 mg) by mouth daily       nitroglycerin 0.4 MG sublingual tablet    NITROSTAT    25 tablet    Place 1 tablet (0.4 mg) under the tongue every 5 minutes as needed for chest pain       omeprazole 20 MG CR capsule    priLOSEC    90 capsule    TAKE ONE CAPSULE BY MOUTH 30 MINUTES BEFORE BREAKFAST. DO NOT TAKE WITH LEVOTHYROXINE       * order for DME     1 Box    Equipment being ordered: Dispense face mask. Mrs. Spicer is immunosuppressed due to rheumatoid arthritis.       * order for DME     1 each    Equipment being ordered: Nebulizer. Use with Albuterol.       order for DME     1 each    Equipment being ordered: Dispense baffle, for use with nebulizer.       * order for DME     1 each    Equipment being ordered: Left wrist splint.       * order for DME     1 each    Equipment being ordered: Left wrist splint.       PRESERVISION AREDS PO      Take 2 tablets by mouth daily       REFRESH P.M. Oint      Apply  to eye. Daily at bedtime       saccharomyces boulardii 250 MG capsule    FLORASTOR     Take 250 mg by mouth       senna-docusate 8.6-50 MG per tablet    SENOKOT-S;PERICOLACE    120 tablet     Take 2 tablets by mouth At Bedtime Hold if diarrhea occurs.       SPIRONOLACTONE PO      Take 25 mg by mouth every morning Hold if SBP is less than 120.       triamcinolone 0.1 % cream    KENALOG    453.6 g    Apply sparingly to affected area on face three times daily.       ZYRTEC ALLERGY 10 MG tablet   Generic drug:  cetirizine      Take 10 mg by mouth At Bedtime       * Notice:  This list has 4 medication(s) that are the same as other medications prescribed for you. Read the directions carefully, and ask your doctor or other care provider to review them with you.

## 2017-03-06 NOTE — PROGRESS NOTES
CARDIOLOGY CLINIC FOLLOW UP    Referring Provider:  Established Patient  Primary Care Provider:   Marianne Basurto    HPI: Linda Spicer is a 85 year old female who returns to the cardiology clinic for ongoing evaluation of HFpEF and recent paroxysmal atrial flutter.  The patient's risk factor profile is: (+) HTN, (-) diabetes, (-) hyperlipidemia, (-) tobacco use, (+) family Hx CAD [mother, sister]. The patient was diagnosed with HFpEF in Oct 2014.  She had a dobutamine ECHO (Oct 2014) that was normal.  She had a RHC in Oct 2014 that showed normal hemodynamics at baseline although the CO was mildly depressed.  With fluid challenge, the left sided filling pressures dramatically increased. The results of her prior dobutamine ECHO and RHC are noted below; she has not undergone coronary angiography.    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 She had PFTs in Aug 2015 that showed moderate lung diffusion defect.  She did not desaturate after walking 6 minutes.  Inhalers have not provided any benefit.   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  She has been recuperating gradually.    She presented in August 2016 with chest pain, dizziness, and lightheadedness.  She was in atrial flutter at that time but converted back to NSR while in the ER.  She was started on metoprolol XL 12.5 mg daily, losartan was decreased to 50 mg daily, and spironolactone 25 mg qd was continued.  Her ECG did not show ischemic changes and her troponins were negative.  She was treated on heparin but anticoagulation was deferred and she was given ASA 81 mg qd.      In Nov 2016 she reported rapid fatiguability and signficant RUSSELL.  She uses a bike in the exercise room for 10 minutes followed by walking 20-30 minutes a day.  Her exercise is limited by SOB and fatigue.  She has used home nebullizer but  does not require home O2 therapy. I started her on Apixaban at that time.    In Feb 2017 she was started to have recurrent atrial flutter.  She was treated with higher dose of beta blocker but developed bradycardia and near syncope and her beta blocker was stopped.  She returned to [facility] with tachycardia and a pacemaker was placed and she was restarted on Toprol XL 12.5 mg qd.  Her losartan was decreased to 25 mg qd and her spironolactone was continued at 25 mg qd.    She denies chest pain.  She continues to experience SOB, unchanged.  She developed bronchiectasis and remains on antibiotics but is feeling better.  She is not O2 dependent.  She experiences RUSSELL with a flight of steps or walking a block.  She denies pedal edema, PND and orthopnea.  She denies palpitations, lightheadedness, and syncope.      She remains compliant with her medications.    Her BPs are borderline elevated 125-145/57-70 mm Hg.  Her HR is in the 60-78 range.    PAST MEDICAL HISTORY:  Past Medical History   Diagnosis Date     Adjustment disorder with anxious mood 5/18/2015     Advanced directives, counseling/discussion 8/30/2012     Patient states has Advance Directive and will bring in a copy to clinic. 8/30/2012   [facility] Medical Assistant \       Anemia 9/25/2015     Basal cell cancer      CHF (congestive heart failure) (H) 9/18/2014     CKD (chronic kidney disease) stage 3, GFR 30-59 ml/min 9/29/2015     DDD (degenerative disc disease), lumbar 9/25/2015     Diffuse idiopathic pulmonary fibrosis (H) 5/6/2013     Encounter for palliative care 5/18/2015     History of blood transfusion 9/29/2015     Hypertension goal BP (blood pressure) < 140/90 9/7/2012     Hypothyroid 9/7/2012     Irritable bowel syndrome 10/29/2013     Macular degeneration      Macular degeneration, 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     Nondisplaced spiral fracture of shaft of humerus      Osteoporosis 8/13/2013      [employer] Update utility     RA (rheumatoid arthritis) (H)  5/7/2013     Rheumatic fever      Shingles      Spinal stenosis of lumbar region with neurogenic claudication 9/14/2015       CURRENT MEDICATIONS:  Current Outpatient Prescriptions   Medication Sig Dispense Refill     losartan (COZAAR) 25 MG tablet Take 1 tablet (25 mg) by mouth daily (with dinner) Hold if SBP less than 110. 90 tablet 3     levofloxacin (LEVAQUIN) 250 MG tablet Take 1 tablet (250 mg) by mouth daily 30 tablet 11     SPIRONOLACTONE PO Take 25 mg by mouth every morning Hold if SBP is less than 120.       folic acid (FOLVITE) 1 MG tablet Take 1 tablet (1 mg) by mouth daily 100 tablet 3     apixaban ANTICOAGULANT (ELIQUIS) 2.5 MG tablet Take 1 tablet (2.5 mg) by mouth 2 times daily 60 tablet 11     omeprazole (PRILOSEC) 20 MG CR capsule TAKE ONE CAPSULE BY MOUTH 30 MINUTES BEFORE BREAKFAST. DO NOT TAKE WITH LEVOTHYROXINE 90 capsule 3     senna-docusate (SENOKOT-S;PERICOLACE) 8.6-50 MG per tablet Take 2 tablets by mouth At Bedtime Hold if diarrhea occurs. 120 tablet 11     Cranberry 180 MG CAPS Take 1 capsule by mouth daily Unsure of strength       metoprolol (TOPROL-XL) 25 MG 24 hr tablet Take 0.5 tablets (12.5 mg) by mouth daily (Patient taking differently: Take 12.5 mg by mouth every morning Hold if SBP less than 100.) 45 tablet 6     Carboxymethylcellulose Sodium 1 % GEL 1-2 drops (aka Genteal)       saccharomyces boulardii (FLORASTOR) 250 MG capsule Take 250 mg by mouth       levothyroxine (SYNTHROID, LEVOTHROID) 75 MCG tablet Take 1 tablet (75 mcg) by mouth daily 90 tablet 2     order for DME Equipment being ordered: Left wrist splint. 1 each 0     order for DME Equipment being ordered: Left wrist splint. 1 each 0     IBANdronate (BONIVA) 150 MG tablet Take 1 tablet (150 mg) by mouth every 30 days 3 tablet 3     order for DME Equipment being ordered: Dispense baffle, for use with nebulizer. 1 each 0     order for DME Equipment being ordered: Nebulizer. Use with Albuterol. 1 each 0     order for DME  Equipment being ordered: Dispense face mask.  Mrs. Spicer is immunosuppressed due to rheumatoid arthritis. 1 Box 11     triamcinolone (KENALOG) 0.1 % cream Apply sparingly to affected area on face three times daily. 453.6 g 5     Multiple Vitamins-Minerals (PRESERVISION AREDS PO) Take 2 tablets by mouth daily       Blood Pressure Monitor KIT 1 kit daily as needed 1 kit 0     nitroglycerin (NITROSTAT) 0.4 MG SL tablet Place 1 tablet (0.4 mg) under the tongue every 5 minutes as needed for chest pain 25 tablet 0     Ranibizumab (LUCENTIS IO) 1 injection every 4 Weeks       cetirizine (ZYRTEC ALLERGY) 10 MG tablet Take 10 mg by mouth At Bedtime       Hypromellose (GENTEAL MILD OP) Apply  to eye daily.       Artificial Tear Ointment (REFRESH P.M.) OINT Apply  to eye. Daily at bedtime          calcium-vitamin D (CALTRATE) 600-400 MG-UNIT per tablet Take 1 tablet by mouth 2 times daily        fish oil-omega-3 fatty acids (FISH OIL) 1000 MG capsule Take 2 g by mouth daily. 2 capsules daily              PAST SURGICAL HISTORY:  Past Surgical History   Procedure Laterality Date     Biopsy       hemorrhoidectomy     Ent surgery       tonsillectomy     Appendectomy       Hysterectomy, pap no longer indicated       Gyn surgery       3 D & C's     Laminectomy lumbar one level N/A 10/13/2015     Procedure: LAMINECTOMY LUMBAR ONE LEVEL;  Surgeon: Fransico Toussaint MD;  Location: [facility] OR       ALLERGIES  Cephalexin hcl; Gabapentin; Naproxen; Perfume; Lactase; Macrobid [nitrofurantoin anhydrous]; Sulfa drugs; and Xanax [alprazolam]    FAMILY HX:  Family History   Problem Relation Age of Onset     Hypertension Mother      Psychotic Disorder Father      DIABETES Son      DIABETES Daughter      Blood Disease Daughter        SOCIAL HX:  History     Social History     Marital Status:      Spouse Name: N/A     Number of Children: N/A     Years of Education: N/A     Social History Main Topics     Smoking status: Never Smoker       Smokeless tobacco: Never Used     Alcohol Use: Yes      Comment: rare wine      Drug Use: No     Sexual Activity: No     Other Topics Concern     None     Social History Narrative    . Has six children. She enjoys bridge and genealogy. Her  has cancer. Her son has financial and alcohol issues.       ROS:  Constitutional: No fever, chills, or sweats. No weight gain/loss.   HEENT: No visual disturbance, ear ache, epistaxis, sore throat.   Allergies/Immunologic: Negative.   Respiratory:(+) cough productive of clear sputum, (-) hemoptysis.   Cardiovascular: As per HPI.   GI: No nausea, vomiting, hematemesis, melena, or hematochezia.   : No urinary frequency, dysuria, or hematuria.   Integument: No rash.   Psychiatric: No anxiety / depression.   Neuro: No speech disturbance, focal sensory or motor deficit.   Endocrinology: No polyuria / polyphagia.   Musculoskeletal: No myalgia.    VITAL SIGNS:  /61 (BP Location: Left arm, Patient Position: Chair, Cuff Size: Adult Regular)  Pulse 61  Wt 69.9 kg (154 lb)  SpO2 99%  BMI 27.28 kg/m2  Body mass index is 27.28 kg/(m^2).  Wt Readings from Last 2 Encounters:   03/06/17 69.9 kg (154 lb)   03/03/17 71.8 kg (158 lb 6.4 oz)       PHYSICAL EXAM  Linda Spicer is a 84 year old female.in no acute distress.  HEENT: Eyes Nonicteric.  Neck: JVP normal.  Carotids +3/3 bilaterally without bruits.  Lungs: Bibasilar crackles about 1/3 the way up [unchanged] slightly worse than last clinic visit.  Cor: RRR. Normal S1 and S2.  No murmur, rub, or gallop.  PMI in Lf 5th ICS.  Abd: Soft, nontender, nondistended.  NABS.  No pulsatile mass.  Extremities: No C/C/E.  Pulses +3/3 symmetric in upper and lower extremities.  Neuro: Grossly intact.  Psych: A&O x 3.  Skin: No rash.    LABS  Recent Labs   Lab Test  10/15/15   0851  10/14/15   0645   WBC  10.0  11.6*   HGB  9.7*  9.7*   HCT  29.3*  29.4*   PLT  169  195     Recent Labs   Lab Test  10/15/15   0851  10/14/15   0645   NA   139  140   POTASSIUM  4.4  4.7   CHLORIDE  110*  112*   CO2  21  22   GLC  87  94   BUN  28  32*   CR  1.05*  1.08*   FATOUMATA  8.5  8.4*     Recent Labs   Lab Test  14   0821   CHOL  155   HDL  42*   LDL  97   TRIG  84   CHOLHDLRATIO  3.7        EK16  SB at 54.  Intervals: 0.17/0.076/0.404.  Isolated Q wave in III.  No other abnormalities.    EK/7/15  SB at 49.  Q waves in III and aVF.  No change from ECG () by report but I am not able to locate this or any other ECGs.    ECHO: None    DOBUTAMINE ECHO STRESS TEST:  14  Interpretation Summary  The EKG portion of this stress test was negative for inducible ischemia (see echo results below). Normal resting wall motion and no stress-induced wall motion abnormality.    Baseline  Normal baseline electrocardiogram.  Normal left ventricular wall motion.    Stress  The maximum dose of dobutamine was 20mcg/kg/min.  Target Heart Rate was achieved.  The EKG portion of this stress test was negative for inducible ischemia (see   echo results below).  Arrhythmia induced during stress: occasional PVC's and PAC's.  Normal resting wall motion and no stress-induced wall motion abnormality.    Left Ventricle  There is mild concentric left ventricular hypertrophy.  Left ventricular systolic function is normal.    Right Ventricle  The right ventricle is normal in size and function.    Atria  The left atrium is moderate to severely dilated.    Mitral Valve  There is mild (1+) mitral regurgitation.    Tricuspid Valve  There is mild (1+) tricuspid regurgitation.  The right ventricular systolic pressure is approximated at 39 mmHg plus the   right atrial pressure.    Aortic Valve  There is trace aortic regurgitation.    Pericardial/Pleural  There is no pericardial effusion.      RIGHT HEART CATH: 14  RA 3  RV 36/3  PA 36/13 (mean 21)  PCW 10  Fluid challenge 500 ml NS --> PA 46/18 (mean 27 mm Hg), PCW 18 with prominent v waves    PFTs  (8/21/15)  The six-minute  walk distance is normal.  There is no significant desaturation during the six-minute walk done on room air.  No significant airflow obstruction.  Moderate Diffusion Defect   Compared with testing from 1/15/2015 there has been an increase in the FVC.    CORONARY ANGIOGRAPHY:  None    ASSESSMENT AND PLAN:   1. Heart Failure, preserved LVEF.  The patient has significant RUSSELL, occurring with minimal activities. I attribute this to her underlying pulmonary fibrosis a recent pneumonia superimposed.  She is not volume overloaded on exam.  Continue Losartan 25 mg qd and Aldactone 25 mg qd and Toprol XL 12.5 mg qd.  We will monitor for worsening pulmonary symptoms on the beta blocker  Continue with light exercise. Avoid added salt and processed foods.    2. Atrial Flutter, Paroxysmal.  I would equate paroxysmal atrial flutter to PAF in regard to risk of stroke or systemic embolism.    The patient has a CHADS2-Vasc score of 4, corresponding to a 4.0% annual stroke / systemic emolism event rate.  The patient has Stage III CKD with GFR 39 - 42 ml/min.  She is on Eliquis 2.5 mg BID given the borderline Cr and the age > 80 yrs.  She has concerns over the interaction of coumadin with her food.  Pacemaker interrogation per protocol, at [facility].    3. Rheumatoid Pulmonary Fibrosis.  Per Dr. Comer, she had severe dyspnea and hyperventilation in the past now showing significant improvement in ventilatory control and symptoms and improvement in pulmonary function and exercise tolerance.  There was a concern that patient was destaturating but she did not do so on 6 minute walk test.  She is up to date on vaccinations and flu shots.    4. Hypertension.  Well controlled on combination of aldactone, Toprol, and Losartan.  Restrict Na.  No new changes.    FOLLOW UP:  6 months      Emeterio Loza MD    Divisions of Cardiology  [place], [place]    CC  ESTABLISHED PATIENT

## 2017-03-06 NOTE — NURSING NOTE
"Linda Spicer's goals for this visit include: atrial flutter  She requests these members of her care team be copied on today's visit information: yes    PCP: Tre Lockett    Referring Provider:  No referring provider defined for this encounter.    Chief Complaint   Patient presents with     RECHECK       Initial /61 (BP Location: Left arm, Patient Position: Chair, Cuff Size: Adult Regular)  Pulse 61  Wt 69.9 kg (154 lb)  SpO2 99%  BMI 27.28 kg/m2 Estimated body mass index is 27.28 kg/(m^2) as calculated from the following:    Height as of 2/21/17: 1.6 m (5' 3\").    Weight as of this encounter: 69.9 kg (154 lb).  Medication Reconciliation: complete    Do you need any medication refills at today's visit? no    "

## 2017-03-06 NOTE — PATIENT INSTRUCTIONS
The following is a summary of your office visit:    Medications started today:none    Medications stopped today:none    Medication dose change: none    Nurse contact information: Stacey Perez RN  Cardiology Care Coordinator  [phone] Phone  [phone] Fax    Appointments made today: 6 month follow up    Patient instructions:1. Stacey will check when you need your next device check at [place]. 2. If your top BP number is almost always over 140, call Stacey        If you have had any blood work, imaging or other testing completed we will be in touch within 1-2 weeks regarding the results. If you have any questions, concerns or need to schedule a follow up, please contact us at [phone]. If you are needing refills please contact your pharmacy. For urgent after hour care please call the [facility] at [phone] or the [facility] at [phone] and ask to speak to the cardiologist on call.    It was a pleasure meeting with you today. Please let us know if there is anything else we can do for you so that we can be sure you are leaving completely satisfied with your care experience.     Your Cardiology Team at [facility]  RN Care Coordinator: Stacey PAGAN: Jaz

## 2017-03-07 ENCOUNTER — CARE COORDINATION (OUTPATIENT)
Dept: CARE COORDINATION | Facility: CLINIC | Age: 82
End: 2017-03-07

## 2017-03-07 NOTE — PROGRESS NOTES
Clinic Care Coordination Contact  OUTREACH    Referral Information:  Referral Source: [employer] Outreach (Per chart notes hospitalized X2 and ED x1 prior to last contact)  Reason for Contact: follow up     Universal Utilization:   ED Visits in last year: 8  Hospital visits in last year: 4  Last PCP appointment: 02/16/17  Missed Appointments: 1  Concerns: no  Multiple Providers or Specialists: yes    Clinical Concerns:  Current Medical Concerns:Prior to last contact, patient had been in hospital ED. She and her daughter had many questions/concerns re treatment plan and meds. They saw Dr. Lockett the next day and patient has seem Rheum and Cards as well. Today she reports feeling better and feeling good about taking care of her health care issues. Continues to have SOB R/T lung disease. She will be seeing Pulmonary in a couple weeks and plans to discuss with provider. SOB has not changed or worsened but clearly evident when she speaks   Current Behavioral Concerns:None   Education Provided to patient: Discussed material given by PCP on Bronchiectasis. Questions were answered.   Clinical Pathway Name: None    Medication Management:  Good understanding of medications and takes appropriately.    Functional Status:  Mobility Status: Independent  Equipment Currently Used at Home: raised toilet, shower chair  Transportation: Family           Psychosocial:  Current living arrangement: I live in a private home with spouse (Live in a Senior Apartment)  Financial/Insurance: No concerns     Resources and Interventions:  Current Resources: Other (see comment) (Heart Failure Action Plan)  Advanced Care Plans/Directives on file: In process     Goals:   Goal 1 Statement: I will complete my health care directive.    Barriers: Multiple complex health issues/many specialties makes everything difficult to understand at times.  Strengths: Patient follows up as directed and tries to gain understanding of health issues and how they ar  interrelated.  Patient/Caregiver understanding: Patient asking appropriate questions re her recent PCP and specialty visits.   Frequency of Care Coordination: as needed  Upcoming appointment: None scheduled     Plan: Patient will F/U as indicated. Will contact RN CC with questions/concerns            RN CC will F/U in 1 month.    John IRAHETAN,RN- BC  Clinic Care Coordinator  [facility]  Phone: [phone]

## 2017-03-13 ENCOUNTER — OFFICE VISIT (OUTPATIENT)
Dept: NURSING | Facility: CLINIC | Age: 82
End: 2017-03-13
Payer: MEDICARE

## 2017-03-13 DIAGNOSIS — J84.112 IPF (IDIOPATHIC PULMONARY FIBROSIS) (H): ICD-10-CM

## 2017-03-13 DIAGNOSIS — J84.112 UIP (USUAL INTERSTITIAL PNEUMONITIS) (H): Primary | ICD-10-CM

## 2017-03-13 PROCEDURE — 94375 RESPIRATORY FLOW VOLUME LOOP: CPT | Performed by: INTERNAL MEDICINE

## 2017-03-13 PROCEDURE — 94726 PLETHYSMOGRAPHY LUNG VOLUMES: CPT | Performed by: INTERNAL MEDICINE

## 2017-03-13 PROCEDURE — 94729 DIFFUSING CAPACITY: CPT | Performed by: INTERNAL MEDICINE

## 2017-03-13 NOTE — MR AVS SNAPSHOT
After Visit Summary   3/13/2017    Linda Spicer    MRN: [id number]           Patient Information     Date Of Birth          6/13/1931        Visit Information        Provider Department      3/13/2017 9:00 AM PFT LAB [facility]        Today's Diagnoses     UIP (usual interstitial pneumonitis) (H)    -  1    IPF (idiopathic pulmonary fibrosis) (H)           Follow-ups after your visit        Your next 10 appointments already scheduled     Mar 14, 2017  9:00 AM CDT   Return Visit with Sandra Comer MD   Formerly named [facility])    [id number] [place] [address]   [phone]            Mar 17, 2017  9:30 AM CDT   Level O with BAY 6 INFUSION   Formerly named [facility])    [address] [place] [address]   [phone]            Mar 31, 2017  9:30 AM CDT   Level O with BAY 4 INFUSION   Formerly named [facility])    [address] [place] [address]   [phone]            May 11, 2017  9:30 AM CDT   LAB with BK LAB   [facility] ([facility])    [address] [facility] [id number] [phone]           Patient must bring picture ID.  Patient should be prepared to give a urine specimen  Please do not eat 10-12 hours before your appointment if you are coming in fasting for labs on lipids, cholesterol, or glucose (sugar).  Pregnant women should follow their Care Team instructions. Water with medications is okay. Do not drink coffee or other fluids.   If you have concerns about taking  your medications, please ask at office or if scheduling via Nex[employer]t, send a message by clicking on Secure Messaging, Message Your Care Team.            May 16, 2017  9:20 AM CDT   Return Visit with Mario Davenport MD   [facility] ([facility])    [address] [place] [address]  [facility] [address]   [phone]            Jun 07, 2017  9:00 AM CDT   Nurse Only with DEVICE CHECK RN CARD   [facility] ([facility])    [address] [address] [id number] [phone]            Sep 11, 2017  1:10 PM CDT   Return Visit with Emeterio Loza MD   [facility] ([facility])    [address] [id number] [phone]              Future tests that were ordered for you today     Open Future Orders        Priority Expected Expires Ordered    RESPIRATORY FLOW VOLUME LOOP Routine  3/13/2018 3/13/2017    HC PLETHYSMOGRAPHY LUNG VOLUMES W/WO AIRWAY RESIST Routine 3/13/2017 3/13/2018 3/13/2017            Who to contact     If you have questions or need follow up information about today's clinic visit or your schedule please contact [facility] directly at [phone].  Normal or non-critical lab and imaging results will be communicated to you by [email or web address], letter or phone within 4 business days after the clinic has received the results. If you do not hear from us within 7 days, please contact the clinic through [employer] or phone. If you have a critical or abnormal lab result, we will notify you by phone as soon as possible.  Submit refill requests through [employer] or call your pharmacy and they will forward the refill request to us. Please allow 3 business days for your refill to be completed.          Additional Information About Your Visit        [email or web address] Information     [employer] gives you secure access to your electronic health record. If you see a primary care provider, you can also send messages to your care team and make appointments. If you have questions, please call your primary care clinic.  If you do not have a primary care provider, please call [phone] and they will assist you.      [employer] is an electronic gateway that provides easy, online access  to your medical records. With Fundation, you can request a clinic appointment, read your test results, renew a prescription or communicate with your care team.     To access your existing account, please contact your [facility] or call [phone] for assistance.        Care EveryWhere ID     This is your Care EveryWhere ID. This could be used by other organizations to access your [facility] medical records  [id number]         Blood Pressure from Last 3 Encounters:   03/06/17 126/61   03/03/17 122/59   02/21/17 130/70    Weight from Last 3 Encounters:   03/06/17 69.9 kg (154 lb)   03/03/17 71.8 kg (158 lb 6.4 oz)   02/21/17 71.2 kg (157 lb)              We Performed the Following     General PFT Lab (Please always keep checked)     HC DIFFUSING CAPACITY     Pulmonary Function Test          Today's Medication Changes          These changes are accurate as of: 3/13/17  4:54 PM.  If you have any questions, ask your nurse or doctor.               These medicines have changed or have updated prescriptions.        Dose/Directions    metoprolol 25 MG 24 hr tablet   Commonly known as:  TOPROL-XL   This may have changed:    - when to take this  - additional instructions   Used for:  Paroxysmal atrial flutter (H)        Dose:  12.5 mg   Take 0.5 tablets (12.5 mg) by mouth daily   Quantity:  45 tablet   Refills:  6                Primary Care Provider Office Phone # Fax #    Tre Lockett [phone]       [facility] [address] [facility] [address]        Thank you!     Thank you for choosing [facility]  for your care. Our goal is always to provide you with excellent care. Hearing back from our patients is one way we can continue to improve our services. Please take a few minutes to complete the written survey that you may receive in the mail after your visit with us. Thank you!             Your Updated Medication List -  Protect others around you: Learn how to safely use, store and throw away your medicines at www.disposemymeds.org.          This list is accurate as of: 3/13/17  4:54 PM.  Always use your most recent med list.                   Brand Name Dispense Instructions for use    apixaban ANTICOAGULANT 2.5 MG tablet    ELIQUIS    60 tablet    Take 1 tablet (2.5 mg) by mouth 2 times daily       Blood Pressure Monitor Kit     1 kit    1 kit daily as needed       calcium-vitamin D 600-400 MG-UNIT per tablet    CALTRATE     Take 1 tablet by mouth 2 times daily       Carboxymethylcellulose Sodium 1 % Gel      1-2 drops (aka Genteal)       Cranberry 180 MG Caps      Take 1 capsule by mouth daily Unsure of strength       fish oil-omega-3 fatty acids 1000 MG capsule      Take 2 g by mouth daily. 2 capsules daily       folic acid 1 MG tablet    FOLVITE    100 tablet    Take 1 tablet (1 mg) by mouth daily       GENTEAL MILD OP      Apply  to eye daily.       IBANdronate 150 MG tablet    BONIVA    3 tablet    Take 1 tablet (150 mg) by mouth every 30 days       levofloxacin 250 MG tablet    LEVAQUIN    30 tablet    Take 1 tablet (250 mg) by mouth daily       levothyroxine 75 MCG tablet    SYNTHROID/LEVOTHROID    90 tablet    Take 1 tablet (75 mcg) by mouth daily       losartan 25 MG tablet    COZAAR    90 tablet    Take 1 tablet (25 mg) by mouth daily (with dinner) Hold if SBP less than 110.       LUCENTIS IO      1 injection every 4 Weeks       metoprolol 25 MG 24 hr tablet    TOPROL-XL    45 tablet    Take 0.5 tablets (12.5 mg) by mouth daily       nitroglycerin 0.4 MG sublingual tablet    NITROSTAT    25 tablet    Place 1 tablet (0.4 mg) under the tongue every 5 minutes as needed for chest pain       omeprazole 20 MG CR capsule    priLOSEC    90 capsule    TAKE ONE CAPSULE BY MOUTH 30 MINUTES BEFORE BREAKFAST. DO NOT TAKE WITH LEVOTHYROXINE       * order for DME     1 Box    Equipment being ordered: Dispense face mask. Mrs. Spicer is  immunosuppressed due to rheumatoid arthritis.       * order for DME     1 each    Equipment being ordered: Nebulizer. Use with Albuterol.       order for DME     1 each    Equipment being ordered: Dispense baffle, for use with nebulizer.       * order for DME     1 each    Equipment being ordered: Left wrist splint.       * order for DME     1 each    Equipment being ordered: Left wrist splint.       PRESERVISION AREDS PO      Take 2 tablets by mouth daily       REFRESH P.M. Oint      Apply  to eye. Daily at bedtime       saccharomyces boulardii 250 MG capsule    FLORASTOR     Take 250 mg by mouth       senna-docusate 8.6-50 MG per tablet    SENOKOT-S;PERICOLACE    120 tablet    Take 2 tablets by mouth At Bedtime Hold if diarrhea occurs.       SPIRONOLACTONE PO      Take 25 mg by mouth every morning Hold if SBP is less than 120.       triamcinolone 0.1 % cream    KENALOG    453.6 g    Apply sparingly to affected area on face three times daily.       ZYRTEC ALLERGY 10 MG tablet   Generic drug:  cetirizine      Take 10 mg by mouth At Bedtime       * Notice:  This list has 4 medication(s) that are the same as other medications prescribed for you. Read the directions carefully, and ask your doctor or other care provider to review them with you.

## 2017-03-14 ENCOUNTER — OFFICE VISIT (OUTPATIENT)
Dept: PULMONOLOGY | Facility: CLINIC | Age: 82
End: 2017-03-14
Payer: MEDICARE

## 2017-03-14 VITALS
WEIGHT: 160.8 LBS | BODY MASS INDEX: 28.48 KG/M2 | SYSTOLIC BLOOD PRESSURE: 152 MMHG | HEART RATE: 75 BPM | RESPIRATION RATE: 24 BRPM | OXYGEN SATURATION: 100 % | DIASTOLIC BLOOD PRESSURE: 77 MMHG | TEMPERATURE: 97.5 F

## 2017-03-14 DIAGNOSIS — R06.02 SHORTNESS OF BREATH: ICD-10-CM

## 2017-03-14 DIAGNOSIS — J84.112 UIP (USUAL INTERSTITIAL PNEUMONITIS) (H): Primary | ICD-10-CM

## 2017-03-14 PROCEDURE — 99214 OFFICE O/P EST MOD 30 MIN: CPT | Performed by: INTERNAL MEDICINE

## 2017-03-14 NOTE — MR AVS SNAPSHOT
After Visit Summary   3/14/2017    Linda Spicer    MRN: [id number]           Patient Information     Date Of Birth          6/13/1931        Visit Information        Provider Department      3/14/2017 9:00 AM Sandra Comer MD [facility]        Care Instructions    Okay to use the neb as needed  Breathing tests improved  CT scan stable  You do not need antibiotics for your lungs  Bronchiectasis is associated with the fibrosis (epected) and you do not have daily symptoms of bronchiectasis that need to be treated specifically          Follow-ups after your visit        Your next 10 appointments already scheduled     Mar 17, 2017  9:30 AM CDT   Level O with BAY 6 INFUSION   [facility] ([facility])    [id number] [place] [address] [phone]            Mar 31, 2017  9:30 AM CDT   Level O with BAY 4 INFUSION   [facility] ([facility])    [address] [place] [address] [phone]            May 11, 2017  9:30 AM CDT   LAB with BK LAB   [facility] ([facility])    [address] [place] [id number] [phone]           Patient must bring picture ID.  Patient should be prepared to give a urine specimen  Please do not eat 10-12 hours before your appointment if you are coming in fasting for labs on lipids, cholesterol, or glucose (sugar).  Pregnant women should follow their Care Team instructions. Water with medications is okay. Do not drink coffee or other fluids.   If you have concerns about taking  your medications, please ask at office or if scheduling via [email or web address], send a message by clicking on Secure Messaging, Message Your Care Team.            May 16, 2017  9:20 AM CDT   Return Visit with Mario Davenport MD   [facility] ([facility])    [address] [place] [address]  [facility] [address]   [phone]            Jun 07, 2017  9:00 AM CDT   Nurse Only with DEVICE CHECK RN CARD   [facility] ([facility])    [address] [place] [address] [phone]            Sep 11, 2017  1:10 PM CDT   Return Visit with Emeterio Loza MD   [facility] ([facility])    [address] [place] [address] [phone]              Who to contact     If you have questions or need follow up information about today's clinic visit or your schedule please contact [facility] directly at [phone].  Normal or non-critical lab and imaging results will be communicated to you by Guardiumhart, letter or phone within 4 business days after the clinic has received the results. If you do not hear from us within 7 days, please contact the clinic through Guardium[employer] or phone. If you have a critical or abnormal lab result, we will notify you by phone as soon as possible.  Submit refill requests through [employer] or call your pharmacy and they will forward the refill request to us. Please allow 3 business days for your refill to be completed.          Additional Information About Your Visit        [employer] Information     [employer] gives you secure access to your electronic health record. If you see a primary care provider, you can also send messages to your care team and make appointments. If you have questions, please call your primary care clinic.  If you do not have a primary care provider, please call [phone] and they will assist you.      [employer] is an electronic gateway that provides easy, online access to your medical records. With [employer], you can request a clinic appointment, read your test results, renew a prescription or communicate with your care team.     To access your existing account, please contact your [facility] or call [phone] for  assistance.        Care EveryWhere ID     This is your Care EveryWhere ID. This could be used by other organizations to access your [place] [facility] medical records  [id number]        Your Vitals Were     Pulse Temperature Respirations Pulse Oximetry BMI (Body Mass Index)       75 97.5  F (36.4  C) (Oral) 24 100% 28.48 kg/m2        Blood Pressure from Last 3 Encounters:   03/14/17 152/77   03/06/17 126/61   03/03/17 122/59    Weight from Last 3 Encounters:   03/14/17 72.9 kg (160 lb 12.8 oz)   03/06/17 69.9 kg (154 lb)   03/03/17 71.8 kg (158 lb 6.4 oz)              Today, you had the following     No orders found for display         Today's Medication Changes          These changes are accurate as of: 3/14/17  9:48 AM.  If you have any questions, ask your nurse or doctor.               These medicines have changed or have updated prescriptions.        Dose/Directions    metoprolol 25 MG 24 hr tablet   Commonly known as:  TOPROL-XL   This may have changed:    - when to take this  - additional instructions   Used for:  Paroxysmal atrial flutter (H)        Dose:  12.5 mg   Take 0.5 tablets (12.5 mg) by mouth daily   Quantity:  45 tablet   Refills:  6                Primary Care Provider Office Phone # Fax #    Tre Lockett [phone]       [facility] [address] [facility] [address]        Thank you!     Thank you for choosing [facility]  for your care. Our goal is always to provide you with excellent care. Hearing back from our patients is one way we can continue to improve our services. Please take a few minutes to complete the written survey that you may receive in the mail after your visit with us. Thank you!             Your Updated Medication List - Protect others around you: Learn how to safely use, store and throw away your medicines at www.disposemymeds.org.          This list is accurate as of: 3/14/17  9:48 AM.  Always use your most recent  med list.                   Brand Name Dispense Instructions for use    apixaban ANTICOAGULANT 2.5 MG tablet    ELIQUIS    60 tablet    Take 1 tablet (2.5 mg) by mouth 2 times daily       Blood Pressure Monitor Kit     1 kit    1 kit daily as needed       calcium-vitamin D 600-400 MG-UNIT per tablet    CALTRATE     Take 1 tablet by mouth 2 times daily       Carboxymethylcellulose Sodium 1 % Gel      1-2 drops (aka Genteal)       Cranberry 180 MG Caps      Take 1 capsule by mouth daily Unsure of strength       fish oil-omega-3 fatty acids 1000 MG capsule      Take 2 g by mouth daily. 2 capsules daily       folic acid 1 MG tablet    FOLVITE    100 tablet    Take 1 tablet (1 mg) by mouth daily       GENTEAL MILD OP      Apply  to eye daily.       IBANdronate 150 MG tablet    BONIVA    3 tablet    Take 1 tablet (150 mg) by mouth every 30 days       levofloxacin 250 MG tablet    LEVAQUIN    30 tablet    Take 1 tablet (250 mg) by mouth daily       levothyroxine 75 MCG tablet    SYNTHROID/LEVOTHROID    90 tablet    Take 1 tablet (75 mcg) by mouth daily       losartan 25 MG tablet    COZAAR    90 tablet    Take 1 tablet (25 mg) by mouth daily (with dinner) Hold if SBP less than 110.       LUCENTIS IO      1 injection every 4 Weeks       metoprolol 25 MG 24 hr tablet    TOPROL-XL    45 tablet    Take 0.5 tablets (12.5 mg) by mouth daily       nitroglycerin 0.4 MG sublingual tablet    NITROSTAT    25 tablet    Place 1 tablet (0.4 mg) under the tongue every 5 minutes as needed for chest pain       omeprazole 20 MG CR capsule    priLOSEC    90 capsule    TAKE ONE CAPSULE BY MOUTH 30 MINUTES BEFORE BREAKFAST. DO NOT TAKE WITH LEVOTHYROXINE       * order for DME     1 Box    Equipment being ordered: Dispense face mask. Mrs. Spicre is immunosuppressed due to rheumatoid arthritis.       * order for DME     1 each    Equipment being ordered: Nebulizer. Use with Albuterol.       order for DME     1 each    Equipment being ordered:  Dispense baffle, for use with nebulizer.       * order for DME     1 each    Equipment being ordered: Left wrist splint.       * order for DME     1 each    Equipment being ordered: Left wrist splint.       PRESERVISION AREDS PO      Take 2 tablets by mouth daily       REFRESH P.M. Oint      Apply  to eye. Daily at bedtime       saccharomyces boulardii 250 MG capsule    FLORASTOR     Take 250 mg by mouth       senna-docusate 8.6-50 MG per tablet    SENOKOT-S;PERICOLACE    120 tablet    Take 2 tablets by mouth At Bedtime Hold if diarrhea occurs.       SPIRONOLACTONE PO      Take 25 mg by mouth every morning Hold if SBP is less than 120.       triamcinolone 0.1 % cream    KENALOG    453.6 g    Apply sparingly to affected area on face three times daily.       ZYRTEC ALLERGY 10 MG tablet   Generic drug:  cetirizine      Take 10 mg by mouth At Bedtime       * Notice:  This list has 4 medication(s) that are the same as other medications prescribed for you. Read the directions carefully, and ask your doctor or other care provider to review them with you.

## 2017-03-14 NOTE — PROGRESS NOTES
CHIEF COMPLAINT:  Followup of dyspnea.      HISTORY OF PRESENT ILLNESS:  Linda Spicer is an 85-year-old female with history of dyspnea that has been chronic.  She has rheumatoid arthritis with interstitial lung disease.  She is currently on antibiotic chronically for cystitis as well as bronchiectasis.  A question today is whether she should continue this medication from a pulmonary standpoint.  The patient states that she continues to feel increased work of breathing.  Oxygen saturations at home usually between 95 and 99% on room air.  She denies chest pain.  She did have significant cough after receiving a pacemaker for atrial fibrillation.  She was treated with nebs, which she found helpful as well as levofloxacin.  She is continuing on this.  Her cough has essentially resolved.   She does not have a syndrome of bronchiectasis with chronic daily cough with excessive mucus production.  When she makes mucus it is less than 1 tablespoon.  This is not a primary complaint for her. She has occasional throat clearing but no hemoptysis.  She is on immunologic biological therapy for her rheumatoid arthritis.  She is not exposed to cigarette smoke.       PAST MEDICAL HISTORY, ALLERGIES, MEDICATIONS:  Reviewed.      SOCIAL HISTORY:  She is here with her daughter, nonsmoker.      REVIEW OF SYSTEMS:  A detailed review of systems is obtained and is notable for the items in the HPI, arthritis symptoms are under reasonably good control, mild edema.      PHYSICAL EXAMINATION:   GENERAL:  Pleasant female, mildly tachypneic.   VITAL SIGNS:  Blood pressure 152/77.  Pulse is 75, respirations 24.  Temperature is 97.5.  O2 saturation is 100% on room air.  Weight is 160 pounds for a body mass index of 28.   HEENT:  Normocephalic, atraumatic.  Pupils are equal, reactive.  Sclerae are without icterus.  Oropharynx is without exudates.   NECK:  Supple, without lymphadenopathy.   CHEST:  With bilateral basilar dry crackles.  Otherwise, mid  and upper lung fields are clear.   HEART:  Distant S1, S2.  Pacemaker placement not visualized but nontender.   ABDOMEN:  Obese and nontender.   EXTREMITIES:  Perfused with trace edema.      PULMONARY FUNCTION TESTING performed yesterday.  Forced vital capacity of 1.95, which is 83% predicted, FEV1 of 1.5, which is 85% predicted, ratio of 77, which is normal.  Total lung capacity normal at 86% predicted.  Diffusion capacity mildly reduced at 67% predicted.  Findings are consistent with normal spirometry and lung volumes, mild diffusion defect.  Compared to previous pulmonary function testing performed in 2015, there has been no significant change.  Overall PFTs have improved since 2014.      CT scan of the chest from 02/20/2017 was personally reviewed and personally compared with CT from 2013.  Shows some improvement in right mid lung ground-glass opacity, otherwise stable fibrotic changes in both lungs, particularly at the bases.  There is some mild traction bronchiectasis.  There are bilateral changes of honeycombing as well.      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  She has had stabilization with biologic therapy.  She remains tachypneic although pulmonary function testing is normal with the exception of mild diffusion defect.  She has no evidence for hypoxemia. No obstructive bronchiectasis syndrome requiring antibiotic or aggressive airway clearance.     PLAN:   1.  No change in her current medications with the exception that I do not recommend antibiotics from a pulmonary standpoint.   She should be assessed promptly for new signs of lower respiratory tract infection.  However, specific treatment for her classic bronchiectasis is not indicated in this patient.  The patient is going to continue to work on breathing techniques and follow up in this clinic on an as-needed basis.  She is agreeable with this plan.          JOSE LISA MD             D: 2017 10:34   T: 2017 13:30   MT:       Name:     RG WALTER   MRN:      [id number]        Account:      [id number]   :      1931           Visit Date:   2017      Document: [id number]

## 2017-03-14 NOTE — PATIENT INSTRUCTIONS
Okay to use the neb as needed  Breathing tests improved  CT scan stable  You do not need antibiotics for your lungs  Bronchiectasis is associated with the fibrosis (expected) and you do not have daily symptoms of bronchiectasis that need to be treated specifically

## 2017-03-14 NOTE — NURSING NOTE
"Linda Spicer's goals for this visit include: Pulmonary Fibrosis  She requests these members of her care team be copied on today's visit information: yes    PCP: Tre Lockett    Referring Provider:  ESTABLISHED PATIENT  No address on file    Chief Complaint   Patient presents with     RECHECK       Initial /77 (BP Location: Left arm, Patient Position: Chair, Cuff Size: Adult Regular)  Pulse 75  Temp 97.5  F (36.4  C) (Oral)  Resp 24  Wt 72.9 kg (160 lb 12.8 oz)  SpO2 100%  BMI 28.48 kg/m2 Estimated body mass index is 28.48 kg/(m^2) as calculated from the following:    Height as of 2/21/17: 1.6 m (5' 3\").    Weight as of this encounter: 72.9 kg (160 lb 12.8 oz).  Medication Reconciliation: complete    Do you need any medication refills at today's visit? no    "

## 2017-03-16 LAB
DLCOUNC-%PRED-PRE: 67 %
DLCOUNC-PRE: 12.49 ML/MIN/MMHG
DLCOUNC-PRED: 18.46 ML/MIN/MMHG
ERV-%PRED-PRE: 122 %
ERV-PRE: 0.46 L
ERV-PRED: 0.38 L
EXPTIME-PRE: 6.04 SEC
FEF2575-%PRED-PRE: 87 %
FEF2575-PRE: 1.2 L/SEC
FEF2575-PRED: 1.38 L/SEC
FEFMAX-%PRED-PRE: 105 %
FEFMAX-PRE: 4.32 L/SEC
FEFMAX-PRED: 4.09 L/SEC
FEV1-%PRED-PRE: 85 %
FEV1-PRE: 1.5 L
FEV1FEV6-PRE: 77 %
FEV1FEV6-PRED: 77 %
FEV1FVC-PRE: 77 %
FEV1FVC-PRED: 77 %
FEV1SVC-PRE: 74 %
FEV1SVC-PRED: 67 %
FIFMAX-PRE: 2.95 L/SEC
FRCPLETH-%PRED-PRE: 96 %
FRCPLETH-PRE: 2.58 L
FRCPLETH-PRED: 2.68 L
FVC-%PRED-PRE: 83 %
FVC-PRE: 1.95 L
FVC-PRED: 2.33 L
IC-%PRED-PRE: 69 %
IC-PRE: 1.57 L
IC-PRED: 2.25 L
RVPLETH-%PRED-PRE: 92 %
RVPLETH-PRE: 2.12 L
RVPLETH-PRED: 2.28 L
TLCPLETH-%PRED-PRE: 86 %
TLCPLETH-PRE: 4.16 L
TLCPLETH-PRED: 4.81 L
VA-%PRED-PRE: 73 %
VA-PRE: 3.63 L
VC-%PRED-PRE: 77 %
VC-PRE: 2.04 L
VC-PRED: 2.63 L

## 2017-03-17 ENCOUNTER — INFUSION THERAPY VISIT (OUTPATIENT)
Dept: INFUSION THERAPY | Facility: CLINIC | Age: 82
End: 2017-03-17
Payer: MEDICARE

## 2017-03-17 ENCOUNTER — TELEPHONE (OUTPATIENT)
Dept: FAMILY MEDICINE | Facility: CLINIC | Age: 82
End: 2017-03-17

## 2017-03-17 VITALS
BODY MASS INDEX: 26.45 KG/M2 | HEART RATE: 62 BPM | OXYGEN SATURATION: 99 % | TEMPERATURE: 97.4 F | RESPIRATION RATE: 18 BRPM | WEIGHT: 149.3 LBS

## 2017-03-17 DIAGNOSIS — N39.0 CHRONIC UTI: Primary | ICD-10-CM

## 2017-03-17 DIAGNOSIS — M05.79 RHEUMATOID ARTHRITIS INVOLVING MULTIPLE SITES WITH POSITIVE RHEUMATOID FACTOR (H): Primary | ICD-10-CM

## 2017-03-17 PROCEDURE — 96365 THER/PROPH/DIAG IV INF INIT: CPT | Performed by: INTERNAL MEDICINE

## 2017-03-17 PROCEDURE — 99207 ZZC NO CHARGE LOS: CPT

## 2017-03-17 RX ORDER — CIPROFLOXACIN 250 MG/1
250 TABLET, FILM COATED ORAL DAILY
Qty: 90 TABLET | Refills: 3 | Status: SHIPPED | OUTPATIENT
Start: 2017-03-17 | End: 2017-12-07

## 2017-03-17 RX ADMIN — Medication 250 ML: at 10:12

## 2017-03-17 NOTE — PROGRESS NOTES
"Infusion Nursing Note:  Linda KATEY Orellana presents today for Orencia.    Patient seen by provider today: No   present during visit today: Not Applicable.    Note: N/A.    Intravenous Access:  Peripheral IV placed.    Treatment Conditions:  Rheumatology Infusion Checklist: PRIOR TO INFUSION OF BIOLOGICAL MEDICATIONS OR ANY OF THESE AS LISTED: Orencia (abatacept) \".rheumbiologicalchecklist\"    Prior to Infusion of biological medications or any of these as listed:    1. Elevated temperature, fever, chills, productive cough or abnormal vital signs, night sweats, coughing up blood or sputum, no appetite or abnormal vital signs : NO    2. Open wounds or new incisions: NO    3. Recent hospitalization: NO    4.  Recent surgeries:  NO    5. Any upcoming surgeries or dental procedures?:NO    6. Any current or recent bouts of illness or infection? On any antibiotics? : NO    7. Any new, sudden or worsening abdominal pain :NO    8. Vaccination within 4 weeks? Patient or someone in the household is scheduled to receive vaccination? No live virus vaccines prior to or during treatment :NO    9. Any nervous system diseases [i.e. multiple sclerosis, Guillain-[place], seizures, neurological  changes]: NO    10. Pregnant or breast feeding; or plans on pregnancy in the future: NO    11. Signs of worsening depression or suicidal ideations while taking benlysta:NO    12. New-onset medical symptoms: NO    13.  New cancer diagnosis or on chemotherapy or radiation NO    14.  Evaluate for any sign of active TB [Unexplained weight loss, Loss of appetite, Night sweats, Fever, Fatigue, Chills, Coughing for 3 weeks or longer, Hemoptysis (coughing up blood), Chest pain]: NO    **Note: If answered yes to any of the above, hold the infusion and contact ordering rheumatologist or on-call rheumatologist.   .      Post Infusion Assessment:  Patient tolerated infusion without incident.    Discharge Plan:   RTC in 4 wks as scheduled.    BERENICE MELO" CHAD RN

## 2017-03-17 NOTE — MR AVS SNAPSHOT
After Visit Summary   3/17/2017    Linda Spicer    MRN: [id number]           Patient Information     Date Of Birth          6/13/1931        Visit Information        Provider Department      3/17/2017 9:30 AM BAY 6 INFUSION [facility]        Today's Diagnoses     Rheumatoid arthritis involving multiple sites with positive rheumatoid factor (H)    -  1       Follow-ups after your visit        Your next 10 appointments already scheduled     Mar 31, 2017  9:30 AM CDT   Level O with BAY 4 INFUSION   [facility] ([facility]    [address] [place] [id number] [phone]            Apr 21, 2017  9:30 AM CDT   Level O with BAY 4 INFUSION   [facility] ([facility])    [address] [place] [id number] [phone]            May 11, 2017  9:30 AM CDT   [facility]    [address] [facility] [id number] [phone]           Patient must bring picture ID.  Patient should be prepared to give a urine specimen  Please do not eat 10-12 hours before your appointment if you are coming in fasting for labs on lipids, cholesterol, or glucose (sugar).  Pregnant women should follow their Care Team instructions. Water with medications is okay. Do not drink coffee or other fluids.   If you have concerns about taking  your medications, please ask at office or if scheduling via [employer], send a message by clicking on Secure Messaging, Message Your Care Team.            May 16, 2017  9:20 AM CDT   [facility] Visit with Mario Davenport MD   [facility]    [address] [facility] [address]   [phone]            Jun 07, 2017  9:00 AM CDT   Nurse Only with DEVICE CHECK RN CARD   [facility] ([facility])    [id number]  [facility] [address] [phone]            Sep 11, 2017  1:10 PM CDT   Return Visit with Emeterio Loza MD   [facility] ([facility])    [address] [facility] [address] [phone]              Who to contact     If you have questions or need follow up information about today's clinic visit or your schedule please contact [facility] directly at [phone].  Normal or non-critical lab and imaging results will be communicated to you by Joontohart, letter or phone within 4 business days after the clinic has received the results. If you do not hear from us within 7 days, please contact the clinic through "[employer]"t or phone. If you have a critical or abnormal lab result, we will notify you by phone as soon as possible.  Submit refill requests through [employer] or call your pharmacy and they will forward the refill request to us. Please allow 3 business days for your refill to be completed.          Additional Information About Your Visit        [employer] Information     [employer] gives you secure access to your electronic health record. If you see a primary care provider, you can also send messages to your care team and make appointments. If you have questions, please call your primary care clinic.  If you do not have a primary care provider, please call [phone] and they will assist you.      [employer] is an electronic gateway that provides easy, online access to your medical records. With [employer], you can request a clinic appointment, read your test results, renew a prescription or communicate with your care team.     To access your existing account, please contact your [facility] or call [phone] for assistance.        Care EveryWhere ID     This is your Care EveryWhere ID. This could be used by other organizations to access your [facility] medical records  [id number]        Your Vitals Were      Pulse Temperature Respirations Pulse Oximetry BMI (Body Mass Index)       62 97.4  F (36.3  C) (Oral) 18 99% 26.45 kg/m2        Blood Pressure from Last 3 Encounters:   03/14/17 152/77   03/06/17 126/61   03/03/17 122/59    Weight from Last 3 Encounters:   03/17/17 67.7 kg (149 lb 4.8 oz)   03/14/17 72.9 kg (160 lb 12.8 oz)   03/06/17 69.9 kg (154 lb)              We Performed the Following     Treatment Conditions     Treatment Conditions          Today's Medication Changes          These changes are accurate as of: 3/17/17 10:49 AM.  If you have any questions, ask your nurse or doctor.               These medicines have changed or have updated prescriptions.        Dose/Directions    metoprolol 25 MG 24 hr tablet   Commonly known as:  TOPROL-XL   This may have changed:    - when to take this  - additional instructions   Used for:  Paroxysmal atrial flutter (H)        Dose:  12.5 mg   Take 0.5 tablets (12.5 mg) by mouth daily   Quantity:  45 tablet   Refills:  6                Primary Care Provider Office Phone # Fax #    Tre Lockett [phone]       [facility] [address]  [place] [address]        Thank you!     Thank you for choosing [facility]  for your care. Our goal is always to provide you with excellent care. Hearing back from our patients is one way we can continue to improve our services. Please take a few minutes to complete the written survey that you may receive in the mail after your visit with us. Thank you!             Your Updated Medication List - Protect others around you: Learn how to safely use, store and throw away your medicines at www.disposemymeds.org.          This list is accurate as of: 3/17/17 10:49 AM.  Always use your most recent med list.                   Brand Name Dispense Instructions for use    apixaban ANTICOAGULANT 2.5 MG tablet    ELIQUIS    60 tablet    Take 1 tablet (2.5 mg) by mouth 2 times daily        Blood Pressure Monitor Kit     1 kit    1 kit daily as needed       calcium-vitamin D 600-400 MG-UNIT per tablet    CALTRATE     Take 1 tablet by mouth 2 times daily       Carboxymethylcellulose Sodium 1 % Gel      1-2 drops (aka Genteal)       Cranberry 180 MG Caps      Take 1 capsule by mouth daily Unsure of strength       fish oil-omega-3 fatty acids 1000 MG capsule      Take 2 g by mouth daily. 2 capsules daily       folic acid 1 MG tablet    FOLVITE    100 tablet    Take 1 tablet (1 mg) by mouth daily       GENTEAL MILD OP      Apply  to eye daily.       IBANdronate 150 MG tablet    BONIVA    3 tablet    Take 1 tablet (150 mg) by mouth every 30 days       levofloxacin 250 MG tablet    LEVAQUIN    30 tablet    Take 1 tablet (250 mg) by mouth daily       levothyroxine 75 MCG tablet    SYNTHROID/LEVOTHROID    90 tablet    Take 1 tablet (75 mcg) by mouth daily       losartan 25 MG tablet    COZAAR    90 tablet    Take 1 tablet (25 mg) by mouth daily (with dinner) Hold if SBP less than 110.       LUCENTIS IO      1 injection every 4 Weeks       metoprolol 25 MG 24 hr tablet    TOPROL-XL    45 tablet    Take 0.5 tablets (12.5 mg) by mouth daily       nitroglycerin 0.4 MG sublingual tablet    NITROSTAT    25 tablet    Place 1 tablet (0.4 mg) under the tongue every 5 minutes as needed for chest pain       omeprazole 20 MG CR capsule    priLOSEC    90 capsule    TAKE ONE CAPSULE BY MOUTH 30 MINUTES BEFORE BREAKFAST. DO NOT TAKE WITH LEVOTHYROXINE       * order for DME     1 Box    Equipment being ordered: Dispense face mask. Mrs. Spicer is immunosuppressed due to rheumatoid arthritis.       * order for DME     1 each    Equipment being ordered: Nebulizer. Use with Albuterol.       order for DME     1 each    Equipment being ordered: Dispense baffle, for use with nebulizer.       * order for DME     1 each    Equipment being ordered: Left wrist splint.       * order for DME     1 each    Equipment being ordered: Left  wrist splint.       PRESERVISION AREDS PO      Take 2 tablets by mouth daily       REFRESH P.M. Oint      Apply  to eye. Daily at bedtime       saccharomyces boulardii 250 MG capsule    FLORASTOR     Take 250 mg by mouth       senna-docusate 8.6-50 MG per tablet    SENOKOT-S;PERICOLACE    120 tablet    Take 2 tablets by mouth At Bedtime Hold if diarrhea occurs.       SPIRONOLACTONE PO      Take 25 mg by mouth every morning Hold if SBP is less than 120.       triamcinolone 0.1 % cream    KENALOG    453.6 g    Apply sparingly to affected area on face three times daily.       ZYRTEC ALLERGY 10 MG tablet   Generic drug:  cetirizine      Take 10 mg by mouth At Bedtime       * Notice:  This list has 4 medication(s) that are the same as other medications prescribed for you. Read the directions carefully, and ask your doctor or other care provider to review them with you.

## 2017-03-17 NOTE — TELEPHONE ENCOUNTER
The patient called stating that the pulmonologist instructed her to stop the Levaquin and return to the Cipro as a prophylaxis.  The patient will need a refill on the Cipro is that is what you want.  Pharmacy has been indicated.  Please update the patient as to what the decision is and when the medication will be available for the patient to .    Jim Livingston, MSN, RN, PHN  [facility] Care Coordinator  [facility]  Phone: [phone]  [email or web address]

## 2017-03-23 ENCOUNTER — TRANSFERRED RECORDS (OUTPATIENT)
Dept: HEALTH INFORMATION MANAGEMENT | Facility: CLINIC | Age: 82
End: 2017-03-23

## 2017-03-23 ENCOUNTER — TELEPHONE (OUTPATIENT)
Dept: NURSING | Facility: CLINIC | Age: 82
End: 2017-03-23

## 2017-03-24 ENCOUNTER — OFFICE VISIT (OUTPATIENT)
Dept: FAMILY MEDICINE | Facility: CLINIC | Age: 82
End: 2017-03-24
Payer: MEDICARE

## 2017-03-24 ENCOUNTER — CARE COORDINATION (OUTPATIENT)
Dept: CARE COORDINATION | Facility: CLINIC | Age: 82
End: 2017-03-24

## 2017-03-24 ENCOUNTER — TELEPHONE (OUTPATIENT)
Dept: FAMILY MEDICINE | Facility: CLINIC | Age: 82
End: 2017-03-24

## 2017-03-24 VITALS
OXYGEN SATURATION: 96 % | TEMPERATURE: 98 F | BODY MASS INDEX: 28 KG/M2 | DIASTOLIC BLOOD PRESSURE: 72 MMHG | WEIGHT: 158 LBS | HEART RATE: 77 BPM | HEIGHT: 63 IN | SYSTOLIC BLOOD PRESSURE: 110 MMHG

## 2017-03-24 DIAGNOSIS — A08.11 ENTERITIS DUE TO NOROVIRUS: Primary | ICD-10-CM

## 2017-03-24 DIAGNOSIS — A09 ACUTE INFECTIOUS DIARRHEA: ICD-10-CM

## 2017-03-24 LAB
ALBUMIN SERPL-MCNC: 3.5 G/DL (ref 3.4–5)
ALP SERPL-CCNC: 62 U/L (ref 40–150)
ALT SERPL W P-5'-P-CCNC: 36 U/L (ref 0–50)
ANION GAP SERPL CALCULATED.3IONS-SCNC: 11 MMOL/L (ref 3–14)
AST SERPL W P-5'-P-CCNC: 34 U/L (ref 0–45)
BASOPHILS # BLD AUTO: 0 10E9/L (ref 0–0.2)
BASOPHILS NFR BLD AUTO: 0.2 %
BILIRUB SERPL-MCNC: 0.5 MG/DL (ref 0.2–1.3)
BUN SERPL-MCNC: 34 MG/DL (ref 7–30)
CALCIUM SERPL-MCNC: 8.7 MG/DL (ref 8.5–10.1)
CHLORIDE SERPL-SCNC: 104 MMOL/L (ref 94–109)
CO2 SERPL-SCNC: 23 MMOL/L (ref 20–32)
CREAT SERPL-MCNC: 1.31 MG/DL (ref 0.52–1.04)
DIFFERENTIAL METHOD BLD: ABNORMAL
EOSINOPHIL # BLD AUTO: 0.1 10E9/L (ref 0–0.7)
EOSINOPHIL NFR BLD AUTO: 1 %
ERYTHROCYTE [DISTWIDTH] IN BLOOD BY AUTOMATED COUNT: 12.7 % (ref 10–15)
GFR SERPL CREATININE-BSD FRML MDRD: 39 ML/MIN/1.7M2
GLUCOSE SERPL-MCNC: 96 MG/DL (ref 70–99)
HCT VFR BLD AUTO: 35.4 % (ref 35–47)
HGB BLD-MCNC: 11.8 G/DL (ref 11.7–15.7)
LYMPHOCYTES # BLD AUTO: 1.9 10E9/L (ref 0.8–5.3)
LYMPHOCYTES NFR BLD AUTO: 31 %
MCH RBC QN AUTO: 32.4 PG (ref 26.5–33)
MCHC RBC AUTO-ENTMCNC: 33.3 G/DL (ref 31.5–36.5)
MCV RBC AUTO: 97 FL (ref 78–100)
MONOCYTES # BLD AUTO: 0.9 10E9/L (ref 0–1.3)
MONOCYTES NFR BLD AUTO: 14 %
NEUTROPHILS # BLD AUTO: 3.3 10E9/L (ref 1.6–8.3)
NEUTROPHILS NFR BLD AUTO: 53.8 %
PLATELET # BLD AUTO: 224 10E9/L (ref 150–450)
POTASSIUM SERPL-SCNC: 3.7 MMOL/L (ref 3.4–5.3)
PROT SERPL-MCNC: 7.3 G/DL (ref 6.8–8.8)
RBC # BLD AUTO: 3.64 10E12/L (ref 3.8–5.2)
SODIUM SERPL-SCNC: 138 MMOL/L (ref 133–144)
WBC # BLD AUTO: 6.2 10E9/L (ref 4–11)

## 2017-03-24 PROCEDURE — 80053 COMPREHEN METABOLIC PANEL: CPT | Performed by: INTERNAL MEDICINE

## 2017-03-24 PROCEDURE — 99214 OFFICE O/P EST MOD 30 MIN: CPT | Performed by: INTERNAL MEDICINE

## 2017-03-24 PROCEDURE — 36415 COLL VENOUS BLD VENIPUNCTURE: CPT | Performed by: INTERNAL MEDICINE

## 2017-03-24 PROCEDURE — 83993 ASSAY FOR CALPROTECTIN FECAL: CPT | Performed by: INTERNAL MEDICINE

## 2017-03-24 PROCEDURE — 87506 IADNA-DNA/RNA PROBE TQ 6-11: CPT | Performed by: INTERNAL MEDICINE

## 2017-03-24 PROCEDURE — 85025 COMPLETE CBC W/AUTO DIFF WBC: CPT | Performed by: INTERNAL MEDICINE

## 2017-03-24 PROCEDURE — 99000 SPECIMEN HANDLING OFFICE-LAB: CPT | Performed by: INTERNAL MEDICINE

## 2017-03-24 NOTE — PROGRESS NOTES
SUBJECTIVE:                                                    Linda Spicer is a 85 year old female who presents to clinic today for the following health issues:      ED/UC Followup:    Facility:  [facility]  Date of visit: 3-23-17  Reason for visit: dehydration  Current Status: better       Diarrhea  Duration of complaint: acute   Description:       Consistency of stool: loose       Blood in stool: no        Number of loose stools past 24 hours: not elicited       Fever: no        Nausea/vomitting: no        Abdominal pain: YES       Weight loss: no        Recent antibiotics: no        Recent travel: no        Any contacts ill: no   Therapies tried and outcome: Flagyl (dispensed for other reasons) slightly improved diarrhea.    Problem list and histories reviewed & adjusted, as indicated.  Additional history: as documented    Patient Active Problem List   Diagnosis     Advanced directives, counseling/discussion     Hypertension goal BP (blood pressure) < 150/90     Hypothyroid     Diffuse idiopathic pulmonary fibrosis (H)     Macular degeneration, left eye     Irritable bowel syndrome     Encounter for palliative care     Adjustment disorder with anxious mood     Mild anemia     DDD (degenerative disc disease), lumbar     CKD (chronic kidney disease) stage 3, GFR 30-59 ml/min     History of blood transfusion     Aftercare following surgery     S/P lumbar laminectomy     High risk medication use     Osteopenia     Atrophic vaginitis     Fecal incontinence     Female stress incontinence     Impingement syndrome of both shoulders     UIP (usual interstitial pneumonitis) (H)     High risk medications (not anticoagulants) long-term use     Heart failure with preserved ejection fraction (H)     Congestive heart failure with preserved LV function, NYHA class 3 (H)     Other specified hypothyroidism     Rheumatoid arthritis involving multiple sites with positive rheumatoid factor (H)     Health Care Home      Sick sinus syndrome (H)     Past Surgical History:   Procedure Laterality Date     APPENDECTOMY       BIOPSY      hemorrhoidectomy     ENT SURGERY      tonsillectomy     GYN SURGERY      3 D & C's     HYSTERECTOMY, PAP NO LONGER INDICATED       LAMINECTOMY LUMBAR ONE LEVEL N/A 10/13/2015    Procedure: LAMINECTOMY LUMBAR ONE LEVEL;  Surgeon: Fransico Toussaint MD;  Location:  OR       Social History   Substance Use Topics     Smoking status: Never Smoker     Smokeless tobacco: Never Used     Alcohol use Yes      Comment: rare wine      Family History   Problem Relation Age of Onset     Hypertension Mother      Psychotic Disorder Father      DIABETES Son      DIABETES Daughter      Blood Disease Daughter          Allergies   Allergen Reactions     Cephalexin Hcl Diarrhea     Gabapentin Other (See Comments)     Dizzsiness     Naproxen GI Disturbance     Perfume      Lactase Other (See Comments)     Macrobid [Nitrofurantoin Anhydrous]      Possibly related to lung disease      Sulfa Drugs      Throat swelling     Xanax [Alprazolam] Other (See Comments)     Dizziness      Recent Labs   Lab Test  03/24/17   1401  02/16/17   0949  12/22/16   1506   06/03/16   0756   05/22/14   0821   LDL   --    --    --    --   109*   --   97   HDL   --    --    --    --   47*   --   42*   TRIG   --    --    --    --   75   --   84   ALT  36  21  31   < >  36   < >   --    CR  1.31*  1.24*  1.22*   < >  1.44*   < >  1.21*   GFRESTIMATED  39*  41*  42*   < >  35*   < >  43*   GFRESTBLACK  47*  50*  51*   < >  42*   < >  52*   POTASSIUM  3.7  4.7  4.8   < >  5.1   < >  4.1   TSH   --   0.93  0.83   < >   --    < >  0.58    < > = values in this interval not displayed.      BP Readings from Last 3 Encounters:   03/24/17 110/72   03/14/17 152/77   03/06/17 126/61    Wt Readings from Last 3 Encounters:   03/24/17 158 lb (71.7 kg)   03/17/17 149 lb 4.8 oz (67.7 kg)   03/14/17 160 lb 12.8 oz (72.9 kg)                  Labs reviewed in  "EPIC    Reviewed and updated as needed this visit by clinical staff       Reviewed and updated as needed this visit by Provider         ROS:  C: NEGATIVE for fever, chills, change in weight  I: NEGATIVE for worrisome rashes, moles or lesions  E: NEGATIVE for vision changes or irritation  E/M: NEGATIVE for ear, mouth and throat problems  R: NEGATIVE for significant cough or SOB  B: NEGATIVE for masses, tenderness or discharge  CV: NEGATIVE for chest pain, palpitations or peripheral edema  GI: NEGATIVE for heartburn or reflux, hematemesis, hematochezia, jaundice and melena  : NEGATIVE for frequency, dysuria, or hematuria  M: NEGATIVE for significant arthralgias or myalgia  N: NEGATIVE for weakness, dizziness or paresthesias  E: NEGATIVE for temperature intolerance, skin/hair changes  H: NEGATIVE for bleeding problems  P: NEGATIVE for changes in mood or affect    OBJECTIVE:                                                    /72  Pulse 77  Temp 98  F (36.7  C) (Oral)  Ht 5' 3\" (1.6 m)  Wt 158 lb (71.7 kg)  SpO2 96%  BMI 27.99 kg/m2  Body mass index is 27.99 kg/(m^2).  GENERAL: healthy, alert and no distress  EYES: Eyes grossly normal to inspection  NECK: no adenopathy  RESP: lungs clear to auscultation - no rales, rhonchi or wheezes  CV: regular rate and rhythm, normal S1 S2, no S3 or S4, no murmur, click or rub, no peripheral edema and peripheral pulses strong  ABDOMEN: Obese, soft, mildly distended with hyperactive bowel sounds.  MS: no gross musculoskeletal defects noted, no edema  SKIN: no suspicious lesions or rashes  NEURO: Normal strength and tone, mentation intact and speech normal  PSYCH: mentation appears normal, affect normal/bright    Diagnostic Test Results:  Results for orders placed or performed in visit on 03/24/17   CBC with platelets and differential   Result Value Ref Range    WBC 6.2 4.0 - 11.0 10e9/L    RBC Count 3.64 (L) 3.8 - 5.2 10e12/L    Hemoglobin 11.8 11.7 - 15.7 g/dL    " Hematocrit 35.4 35.0 - 47.0 %    MCV 97 78 - 100 fl    MCH 32.4 26.5 - 33.0 pg    MCHC 33.3 31.5 - 36.5 g/dL    RDW 12.7 10.0 - 15.0 %    Platelet Count 224 150 - 450 10e9/L    Diff Method Automated Method     % Neutrophils 53.8 %    % Lymphocytes 31.0 %    % Monocytes 14.0 %    % Eosinophils 1.0 %    % Basophils 0.2 %    Absolute Neutrophil 3.3 1.6 - 8.3 10e9/L    Absolute Lymphocytes 1.9 0.8 - 5.3 10e9/L    Absolute Monocytes 0.9 0.0 - 1.3 10e9/L    Absolute Eosinophils 0.1 0.0 - 0.7 10e9/L    Absolute Basophils 0.0 0.0 - 0.2 10e9/L   Comprehensive metabolic panel (BMP + Alb, Alk Phos, ALT, AST, Total. Bili, TP)   Result Value Ref Range    Sodium 138 133 - 144 mmol/L    Potassium 3.7 3.4 - 5.3 mmol/L    Chloride 104 94 - 109 mmol/L    Carbon Dioxide 23 20 - 32 mmol/L    Anion Gap 11 3 - 14 mmol/L    Glucose 96 70 - 99 mg/dL    Urea Nitrogen 34 (H) 7 - 30 mg/dL    Creatinine 1.31 (H) 0.52 - 1.04 mg/dL    GFR Estimate 39 (L) >60 mL/min/1.7m2    GFR Estimate If Black 47 (L) >60 mL/min/1.7m2    Calcium 8.7 8.5 - 10.1 mg/dL    Bilirubin Total 0.5 0.2 - 1.3 mg/dL    Albumin 3.5 3.4 - 5.0 g/dL    Protein Total 7.3 6.8 - 8.8 g/dL    Alkaline Phosphatase 62 40 - 150 U/L    ALT 36 0 - 50 U/L    AST 34 0 - 45 U/L        Campylobacter group by RUPA Not Detected  NDET  Final 03/24/2017 10:00    Salmonella species by RUPA Not Detected  NDET  Final 03/24/2017 10:00    Shigella species by RUPA Not Detected  NDET  Final 03/24/2017 10:00    Vibrio group by RUPA Not Detected  NDET  Final 03/24/2017 10:00    Rotavirus A by RUPA Not Detected  NDET  Final 03/24/2017 10:00    Shiga toxin 1 gene by RUPA Not Detected  NDET  Final 03/24/2017 10:00    Shiga toxin 2 gene by RUPA Not Detected  NDET  Final 03/24/2017 10:00    Norovirus I and II by RUPA  (A) NDET  Final 03/24/2017 10:00    Detected, Abnormal Result   Yersinia enterocolitica by RUPA Not Detected  NDET  Final 03/24/2017 10:00     Enteric pathogen comment     Final 03/24/2017 10:00        ASSESSMENT/PLAN:                                                            (A08.11) Enteritis due to Norovirus  (primary encounter diagnosis)  Comment: Positive stool culture. Requires conservative treatment only and proper hygiene.  Plan: bismuth subsalicylate 262 MG TABS            (A09) Acute infectious diarrhea  Comment: Rule out potential causeas.  Plan: Enteric Bacteria and Virus Panel by RUPA Stool,         Clostridium difficile Toxin B PCR, Calprotectin        Fecal, bismuth subsalicylate 262 MG TABS,         MetroNIDAZOLE (FLAGYL PO), CBC with platelets         and differential, Comprehensive metabolic panel        (BMP + Alb, Alk Phos, ALT, AST, Total. Bili,         TP)              Follow-up visit if condition worsens.    Tre Lockett MD  [facility]

## 2017-03-24 NOTE — TELEPHONE ENCOUNTER
"Call Type: Triage Call    Presenting Problem: \"My mom has diarrhea that started today\"  She  feels shaky.  Her daughter is not with her.  Triage Note:  Guideline Title: Diarrhea or Other Change in Bowel Habits  Recommended Disposition: See ED Immediately  Original Inclination: Would have called clinic  Override Disposition:  Intended Action: Go to Hospital / ED  Physician Contacted: No  Signs of dehydration ?  YES  New or worsening signs and symptoms that may indicate shock ? NO  Passing red, black or tarry material from rectum AND onset of new signs and  symptoms of hypovolemia ? NO  Rectal bleeding (blood in or on the stool, more than scant; black tarry stools) ?  NO  Known diabetic and diarrhea or other change in bowel habits ? NO  High to low (but not zero) risk of exposure to Ebola within the past 21 days ? NO  Physician Instructions:  Care Advice: Protect the patient from falling or other harm.  Another adult should drive.  Call  if signs and symptoms of shock develop (such as unable to  stand due to faintness, dizziness, or lightheadedness  new onset of confusion  slow to respond or difficult to awaken  skin is pale, gray, cool, or moist to touch  severe weakness  loss of consciousness).  IMMEDIATE ACTION  Change position slowly.  Sit for a couple of minutes before standing to  walk.  Quick position changes may cause or worsen symptoms.  Write down provider's name. List or place the following in a bag for  transport with the patient: current prescription and/or nonprescription  medications  alternative treatments, therapies and medications  and street drugs.  Take sips of clear liquids (such as water, clear fruit juices without pulp,  soda, tea or coffee without dairy or non-dairy creamer, clear broth or  bouillon, oral hydration solution, or plain gelatin, fruit ices/popsicles,  hard candy) as tolerated. Sucking on ice chips is another option.  "

## 2017-03-24 NOTE — TELEPHONE ENCOUNTER
Patient contacted and informed of appointment time with [facility] at 1:20pm. Patient very appreciative.  Cancelled 3pm appointment with Dr. Sharma.     Jacquelyn Schulz RN

## 2017-03-24 NOTE — TELEPHONE ENCOUNTER
..Reason for Call:   Requesting appointment today    Detailed comments: pt was seen at [facility] ER last night for frequent stools///they recurred this am, has had 5-6;  Pt is scheduled with Dr Sharma at 3 pm but is hoping Dr Lockett will add her on for today;   *pt is aware Dr Lockett leaves early.     Phone Number Patient can be reached at: Home number on file [phone] (home)    Best Time: anytime    Can we leave a detailed message on this number? YES    Call taken on 3/24/2017 at 9:03 AM by Radha Martins

## 2017-03-24 NOTE — MR AVS SNAPSHOT
After Visit Summary   3/24/2017    Linda Spicer    MRN: [id number]           Patient Information     Date Of Birth          6/13/1931        Visit Information        Provider Department      3/24/2017 1:20 PM Tre Lockett MD [facility]        Today's Diagnoses     Enteritis due to Norovirus    -  1    Acute infectious diarrhea          Care Instructions    This summary includes the important diagnoses, test, medications and other important parts of your medical history.  Below are a few good we sites you can use to learn more about these.     Www.BeckonCall.sageCrowd : Up to date and easily searchable information on multiple topics.  Www.BeckonCall.sageCrowd/Pharmacy/c_[email or web address].asp : [facility] $4.99 medications  Www.MBF Therapeutics.gov : medication info, interactive tutorials, watch real surgeries online  Www.familydoctor.org : good info from the Academy of Family Physicians  Www.Vdancer.built.io : good info from the [facility]  Www.cdc.gov : public health info, travel advisories, epidemics (H1N1)  Www.aap.org : children's health info, normal development, vaccinations  Www.health.[facility].[place].us : [place] dept of heatlh, public health issues in [place], N1N1    Based on your medical history and these are the current health maintenance or preventive care services that you are due for (some may have been done at this visit:)  There are no preventive care reminders to display for this patient.  =================================================================================  Normal Values   Blood pressure  <140/90 for most adults    <130/80 for some chronic diseases (ask your care team about yours)    BMI (body mass index)  18.5-25 kg/m2 (based on height and weight)     Thank you for visiting [facility]    Normal or non-critical lab and imaging results will be communicated to you by MyChart, letter or phone within 7 days.  If you do not hear from us within 10 days, please call  the clinic. If you have a critical or abnormal lab result, we will notify you by phone as soon as possible.     If you have any questions regarding your visit please contact:     Team Comfort:   Clinic Hours Telephone Number   Dr. Aravind Mooney  Maddie Novak   7am-5pm  Monday - Friday [phone]  Viktor PAZ   Pharmacy 8am-8pm Monday-Thursday      8am-6pm Friday  9am-5pm Saturday-Sunday [phone]   Urgent Care 11am-8pm Monday-Friday        9am-5pm Saturday-Sunday [phone]     After hours, weekend or if you need to make an appointment with your primary provider please call [phone].   After Hours nurse advise: call [place] [facility]: [phone]    Medication Refills:  Call your pharmacy and they will forward the refill to us. Please allow 3 business days for your refills to be completed.    Use Sjh direct marketing concepts (secure email communication and access to your chart) to send your primary care provider a message or make an appointment. Ask someone on your Team how to sign up for Sjh direct marketing concepts. To log on to goTenna or for more information in [employer] please visit the website at [email or web address].  As of October 8, 2013, all password changes, disabled accounts, or ID changes in Sjh direct marketing concepts/MyHealth will be done by our Access Services Department.   If you need help with your account or password, call: [phone]. Clinic staff no longer has the ability to change passwords.           Follow-ups after your visit        Follow-up notes from your care team     Return if symptoms worsen or fail to improve.      Your next 10 appointments already scheduled     Mar 31, 2017  9:30 AM CDT   Level O with [facility]FUSION   [facility]    [address] [address] [place] [address]   [phone]            Apr 21, 2017  9:30 AM CDT   Level O with [facility] INFUSION   [facility] ([facility]  [facility])    [address] [address] [place] [address]   [phone]            May 11, 2017  9:30 AM CDT   LAB with BK LAB   [facility] ([facility])    [address] [facility] [address]   [phone]           Patient must bring picture ID.  Patient should be prepared to give a urine specimen  Please do not eat 10-12 hours before your appointment if you are coming in fasting for labs on lipids, cholesterol, or glucose (sugar).  Pregnant women should follow their Care Team instructions. Water with medications is okay. Do not drink coffee or other fluids.   If you have concerns about taking  your medications, please ask at office or if scheduling via Spire Sensibo, send a message by clicking on Secure Messaging, Message Your Care Team.            May 16, 2017  9:20 AM CDT   Return Visit with Mario Davenport MD   [facility] ([facility])    [address] [facility] [id number] [phone]            Jun 07, 2017  9:00 AM CDT   Nurse Only with DEVICE CHECK RN CARD   [facility]    [address] [place] [id number] [phone]            Sep 11, 2017  1:10 PM CDT   Return Visit with Emeterio Loza MD   [facility] ([facility])    [address] [place] [address] [phone]              Who to contact     If you have questions or need follow up information about today's clinic visit or your schedule please contact [facility] directly at [phone].  Normal or non-critical lab and imaging results will be communicated to you by MyChart, letter or phone within 4 business days after the clinic has received the results. If you do not hear from us within 7 days, please contact the clinic through MyChart or phone. If you have a critical or abnormal lab result, we will  "notify you by phone as soon as possible.  Submit refill requests through Recoup or call your pharmacy and they will forward the refill request to us. Please allow 3 business days for your refill to be completed.          Additional Information About Your Visit        Paperhater.[email or web address] Information     Recoup gives you secure access to your electronic health record. If you see a primary care provider, you can also send messages to your care team and make appointments. If you have questions, please call your primary care clinic.  If you do not have a primary care provider, please call [phone] and they will assist you.        Care EveryWhere ID     This is your Care EveryWhere ID. This could be used by other organizations to access your [facility] medical records  [id number]        Your Vitals Were     Pulse Temperature Height Pulse Oximetry BMI (Body Mass Index)       77 98  F (36.7  C) (Oral) 5' 3\" (1.6 m) 96% 27.99 kg/m2        Blood Pressure from Last 3 Encounters:   03/24/17 110/72   03/14/17 152/77   03/06/17 126/61    Weight from Last 3 Encounters:   03/24/17 158 lb (71.7 kg)   03/17/17 149 lb 4.8 oz (67.7 kg)   03/14/17 160 lb 12.8 oz (72.9 kg)              We Performed the Following     CBC with platelets and differential     Comprehensive metabolic panel (BMP + Alb, Alk Phos, ALT, AST, Total. Bili, TP)          Today's Medication Changes          These changes are accurate as of: 3/24/17 11:59 PM.  If you have any questions, ask your nurse or doctor.               Start taking these medicines.        Dose/Directions    bismuth subsalicylate 262 MG Tabs   Used for:  Acute infectious diarrhea, Enteritis due to Norovirus   Started by:  Tre Lockett MD        Dose:  1 tablet   Take 1 tablet by mouth every 4 hours as needed   Quantity:  80 tablet   Refills:  1            Where to get your medicines      These medications were sent to [facility] - [place] - [address]  " [address], [facility] [address]     Phone:  [phone]     bismuth subsalicylate 262 MG Tabs                Primary Care Provider Office Phone # Fax #    Tre Lockett [phone]       [facility] [address]  [facility] [address]        Thank you!     Thank you for choosing [facility]  for your care. Our goal is always to provide you with excellent care. Hearing back from our patients is one way we can continue to improve our services. Please take a few minutes to complete the written survey that you may receive in the mail after your visit with us. Thank you!             Your Updated Medication List - Protect others around you: Learn how to safely use, store and throw away your medicines at www.disposemymeds.org.          This list is accurate as of: 3/24/17 11:59 PM.  Always use your most recent med list.                   Brand Name Dispense Instructions for use    apixaban ANTICOAGULANT 2.5 MG tablet    ELIQUIS    60 tablet    Take 1 tablet (2.5 mg) by mouth 2 times daily       bismuth subsalicylate 262 MG Tabs     80 tablet    Take 1 tablet by mouth every 4 hours as needed       Blood Pressure Monitor Kit     1 kit    1 kit daily as needed       calcium-vitamin D 600-400 MG-UNIT per tablet    CALTRATE     Take 1 tablet by mouth 2 times daily       Carboxymethylcellulose Sodium 1 % Gel      1-2 drops (aka Genteal)       ciprofloxacin 250 MG tablet    CIPRO    90 tablet    Take 1 tablet (250 mg) by mouth daily       Cranberry 180 MG Caps      Take 1 capsule by mouth daily Unsure of strength       fish oil-omega-3 fatty acids 1000 MG capsule      Take 2 g by mouth daily. 2 capsules daily       FLAGYL PO      Take 500 mg by mouth 2 times daily       folic acid 1 MG tablet    FOLVITE    100 tablet    Take 1 tablet (1 mg) by mouth daily       GENTEAL MILD OP      Apply  to eye daily.       IBANdronate 150 MG tablet    BONIVA    3  tablet    Take 1 tablet (150 mg) by mouth every 30 days       levothyroxine 75 MCG tablet    SYNTHROID/LEVOTHROID    90 tablet    Take 1 tablet (75 mcg) by mouth daily       losartan 25 MG tablet    COZAAR    90 tablet    Take 1 tablet (25 mg) by mouth daily (with dinner) Hold if SBP less than 110.       LUCENTIS IO      1 injection every 4 Weeks       metoprolol 25 MG 24 hr tablet    TOPROL-XL    45 tablet    Take 0.5 tablets (12.5 mg) by mouth daily       nitroglycerin 0.4 MG sublingual tablet    NITROSTAT    25 tablet    Place 1 tablet (0.4 mg) under the tongue every 5 minutes as needed for chest pain       omeprazole 20 MG CR capsule    priLOSEC    90 capsule    TAKE ONE CAPSULE BY MOUTH 30 MINUTES BEFORE BREAKFAST. DO NOT TAKE WITH LEVOTHYROXINE       * order for DME     1 Box    Equipment being ordered: Dispense face mask. Mrs. Spicer is immunosuppressed due to rheumatoid arthritis.       * order for DME     1 each    Equipment being ordered: Nebulizer. Use with Albuterol.       order for DME     1 each    Equipment being ordered: Dispense baffle, for use with nebulizer.       * order for DME     1 each    Equipment being ordered: Left wrist splint.       * order for DME     1 each    Equipment being ordered: Left wrist splint.       PRESERVISION AREDS PO      Take 2 tablets by mouth daily       REFRESH P.M. Oint      Apply  to eye. Daily at bedtime       saccharomyces boulardii 250 MG capsule    FLORASTOR     Take 250 mg by mouth       senna-docusate 8.6-50 MG per tablet    SENOKOT-S;PERICOLACE    120 tablet    Take 2 tablets by mouth At Bedtime Hold if diarrhea occurs.       SPIRONOLACTONE PO      Take 25 mg by mouth every morning Hold if SBP is less than 120.       triamcinolone 0.1 % cream    KENALOG    453.6 g    Apply sparingly to affected area on face three times daily.       ZYRTEC ALLERGY 10 MG tablet   Generic drug:  cetirizine      Take 10 mg by mouth At Bedtime       * Notice:  This list has 4  medication(s) that are the same as other medications prescribed for you. Read the directions carefully, and ask your doctor or other care provider to review them with you.

## 2017-03-24 NOTE — PROGRESS NOTES
Clinic Care Coordination Contact  Care Team Conversations    D:  Pt called CC expressing concerns with frequent loose stools.  Per pt she was seen in the [facility] ED last night d/t frequent loose stools.  Pt states this started roughly 36 hours ago.  Pt notes not fever, no vomiting however has had episodes of nausea.  A stool culture was not done in the ER as pt had no further stools.  However the pt states the stooling restarted shortly after she got back home.  Pt states she has had 5-6 stools so far.       I/A:  Assisted pt in connecting with [place] Sched[facility] to arrange a clinic appointment for today.  Pt PCP has no availability however pt was scheduled to see Dr. Sharma at 3 p.m. Today.  Pt states she will call her daughter to arrange transportation as well as having her attend the appointment.       P: Will updated primary RN CC for additional f/u.    Swathi Brito, RN BSN, PHN RN Care Coordinator  [facility]  [email or web address]  [phone]  3/24/2017 9:19 AM

## 2017-03-24 NOTE — PATIENT INSTRUCTIONS
This summary includes the important diagnoses, test, medications and other important parts of your medical history.  Below are a few good we sites you can use to learn more about these.     Www.Cheers.org : Up to date and easily searchable information on multiple topics.  Www.Cheers.org/[email or web address]/[email or web address] : [place] [facility] $4.99 medications  Www.medlineplus.gov : medication info, interactive tutorials, watch real surgeries online  Www.familydoctor.org : good info from the Academy of Family Physicians  Www.mayoDeposcoinic.com : good info from the [facility]  Www.cdc.gov : public health info, travel advisories, epidemics (H1N1)  Www.aap.org : children's health info, normal development, vaccinations  Www.health.[facility].us : [place] dept of heat, public health issues in [place], N1N1    Based on your medical history and these are the current health maintenance or preventive care services that you are due for (some may have been done at this visit:)  There are no preventive care reminders to display for this patient.  =================================================================================  Normal Values   Blood pressure  <140/90 for most adults    <130/80 for some chronic diseases (ask your care team about yours)    BMI (body mass index)  18.5-25 kg/m2 (based on height and weight)     Thank you for visiting [facility]    Normal or non-critical lab and imaging results will be communicated to you by MyChart, letter or phone within 7 days.  If you do not hear from us within 10 days, please call the clinic. If you have a critical or abnormal lab result, we will notify you by phone as soon as possible.     If you have any questions regarding your visit please contact:     Team Comfort:   Clinic Hours Telephone Number   Dr. Aravind Novak   7am-5pm  Monday - Friday [phone]  Viktor PAZ   Pharmacy 8am-8pm  Monday-Thursday      8am-6pm Friday  9am-5pm Saturday-Sunday [phone]   Urgent Care 11am-8pm Monday-Friday        9am-5pm Saturday-Sunday [phone]     After hours, weekend or if you need to make an appointment with your primary provider please call [phone].   After Hours nurse advise: call [place] [facility]: [phone]    Medication Refills:  Call your pharmacy and they will forward the refill to us. Please allow 3 business days for your refills to be completed.    Use LearnBIG (secure email communication and access to your chart) to send your primary care provider a message or make an appointment. Ask someone on your Team how to sign up for LearnBIG. To log on to Sarta or for more information in [employer] please visit the website at [email or web address].  As of October 8, 2013, all password changes, disabled accounts, or ID changes in LearnBIG/MyHealth will be done by our Access Services Department.   If you need help with your account or password, call: [phone]. Clinic staff no longer has the ability to change passwords.

## 2017-03-24 NOTE — TELEPHONE ENCOUNTER
Per Dr. Lockett, he will not be available at 11:40a, pt will need to come in later after 1:20p.  appt at 11:40a is cancelled.  Elmo Jimenez,  For Teams Comfort and Heart

## 2017-03-24 NOTE — TELEPHONE ENCOUNTER
Patient is being seen by a provider in the department.    Jim Livingston, MSN, RN, PHN  [facility] Care Coordinator  [facility]  Phone: [phone]  [email or web address]

## 2017-03-24 NOTE — NURSING NOTE
"Chief Complaint   Patient presents with     Hospital F/U     ER [facility] follow up       Initial /73 (BP Location: Left arm, Patient Position: Chair, Cuff Size: Adult Regular)  Pulse 77  Temp 98  F (36.7  C) (Oral)  Ht 5' 3\" (1.6 m)  Wt 158 lb (71.7 kg)  SpO2 96%  BMI 27.99 kg/m2 Estimated body mass index is 27.99 kg/(m^2) as calculated from the following:    Height as of this encounter: 5' 3\" (1.6 m).    Weight as of this encounter: 158 lb (71.7 kg).  Medication Reconciliation: complete     Jorge Forrester MA      "

## 2017-03-25 DIAGNOSIS — A09 INFECTIOUS DIARRHEA: ICD-10-CM

## 2017-03-26 LAB
CAMPYLOBACTER GROUP BY NAT: NOT DETECTED
ENTERIC PATHOGEN COMMENT: ABNORMAL
NOROVIRUS I AND II BY NAT: ABNORMAL
ROTAVIRUS A BY NAT: NOT DETECTED
SALMONELLA SPECIES BY NAT: NOT DETECTED
SHIGA TOXIN 1 GENE BY NAT: NOT DETECTED
SHIGA TOXIN 2 GENE BY NAT: NOT DETECTED
SHIGELLA SP+EIEC IPAH STL QL NAA+PROBE: NOT DETECTED
VIBRIO GROUP BY NAT: NOT DETECTED
YERSINIA ENTEROCOLITICA BY NAT: NOT DETECTED

## 2017-03-27 LAB — CALPROTECTIN STL-MCNT: 104 UG/G

## 2017-03-30 ENCOUNTER — DOCUMENTATION ONLY (OUTPATIENT)
Dept: OTHER | Facility: CLINIC | Age: 82
End: 2017-03-30

## 2017-03-30 DIAGNOSIS — Z71.89 ACP (ADVANCE CARE PLANNING): Chronic | ICD-10-CM

## 2017-03-31 ENCOUNTER — INFUSION THERAPY VISIT (OUTPATIENT)
Dept: INFUSION THERAPY | Facility: CLINIC | Age: 82
End: 2017-03-31
Payer: MEDICARE

## 2017-03-31 VITALS
SYSTOLIC BLOOD PRESSURE: 146 MMHG | RESPIRATION RATE: 18 BRPM | BODY MASS INDEX: 27.9 KG/M2 | TEMPERATURE: 98.5 F | DIASTOLIC BLOOD PRESSURE: 72 MMHG | OXYGEN SATURATION: 100 % | WEIGHT: 157.5 LBS | HEART RATE: 62 BPM

## 2017-03-31 DIAGNOSIS — M05.79 RHEUMATOID ARTHRITIS INVOLVING MULTIPLE SITES WITH POSITIVE RHEUMATOID FACTOR (H): Primary | ICD-10-CM

## 2017-03-31 PROCEDURE — 96365 THER/PROPH/DIAG IV INF INIT: CPT | Performed by: NURSE PRACTITIONER

## 2017-03-31 PROCEDURE — 99207 ZZC NO CHARGE LOS: CPT

## 2017-03-31 RX ADMIN — Medication 250 ML: at 10:23

## 2017-03-31 NOTE — MR AVS SNAPSHOT
After Visit Summary   3/31/2017    Linda Spicer    MRN: [id number]           Patient Information     Date Of Birth          6/13/1931        Visit Information        Provider Department      3/31/2017 9:30 AM [place] [facility]        Today's Diagnoses     Rheumatoid arthritis involving multiple sites with positive rheumatoid factor (H)    -  1       Follow-ups after your visit        Your next 10 appointments already scheduled     Apr 28, 2017  9:30 AM CDT   Level O with [place] [facility] INFUSION   [facility]    [address] [address] [address]   [phone]            May 11, 2017  9:30 AM CDT   LAB with [facility] LAB   [facility]    [address] [facility] [id number] [phone]           Patient must bring picture ID.  Patient should be prepared to give a urine specimen  Please do not eat 10-12 hours before your appointment if you are coming in fasting for labs on lipids, cholesterol, or glucose (sugar).  Pregnant women should follow their Care Team instructions. Water with medications is okay. Do not drink coffee or other fluids.   If you have concerns about taking  your medications, please ask at office or if scheduling via [employer], send a message by clicking on Secure Messaging, Message Your Care Team.            May 16, 2017  9:20 AM CDT   Return Visit with Mario Davenport MD   [facility]    [address] [facility] [id number] [phone]            May 26, 2017  9:30 AM CDT   Level O with [place] [facility] ([facility])    [address] [address] [address]   [phone]            Jun 07, 2017  9:00 AM CDT   Nurse Only with DEVICE CHECK RN CARD   [facility] ([facility]    [address] [address]  [facility] [address] [phone]            Sep 11, 2017  1:10 PM CDT   Return Visit with Emeterio Loza MD   [facility]    [address] [facility] [address] [phone]              Who to contact     If you have questions or need follow up information about today's clinic visit or your schedule please contact [facility] directly at [phone].  Normal or non-critical lab and imaging results will be communicated to you by [employer], letter or phone within 4 business days after the clinic has received the results. If you do not hear from us within 7 days, please contact the clinic through [email or web address] or phone. If you have a critical or abnormal lab result, we will notify you by phone as soon as possible.  Submit refill requests through [employer] or call your pharmacy and they will forward the refill request to us. Please allow 3 business days for your refill to be completed.          Additional Information About Your Visit        [employer] Information     [employer] gives you secure access to your electronic health record. If you see a primary care provider, you can also send messages to your care team and make appointments. If you have questions, please call your primary care clinic.  If you do not have a primary care provider, please call [phone] and they will assist you.      [employer] is an electronic gateway that provides easy, online access to your medical records. With [employer], you can request a clinic appointment, read your test results, renew a prescription or communicate with your care team.     To access your existing account, please contact your [facility] or call [phone] for assistance.        Care EveryWhere ID     This is your Care EveryWhere ID. This could be used by other organizations to access your [place] medical records  [id number]        Your Vitals Were      Pulse Temperature Respirations Pulse Oximetry BMI (Body Mass Index)       62 98.5  F (36.9  C) (Oral) 18 100% 27.9 kg/m2        Blood Pressure from Last 3 Encounters:   03/31/17 146/72   03/24/17 110/72   03/14/17 152/77    Weight from Last 3 Encounters:   03/31/17 71.4 kg (157 lb 8 oz)   03/24/17 71.7 kg (158 lb)   03/17/17 67.7 kg (149 lb 4.8 oz)              Today, you had the following     No orders found for display       Primary Care Provider Office Phone # Fax #    Tre Lockett [phone]       [facility] [address] [facility] [address]        Thank you!     Thank you for choosing [facility]  for your care. Our goal is always to provide you with excellent care. Hearing back from our patients is one way we can continue to improve our services. Please take a few minutes to complete the written survey that you may receive in the mail after your visit with us. Thank you!             Your Updated Medication List - Protect others around you: Learn how to safely use, store and throw away your medicines at www.disposemymeds.org.          This list is accurate as of: 3/31/17 11:50 AM.  Always use your most recent med list.                   Brand Name Dispense Instructions for use    apixaban ANTICOAGULANT 2.5 MG tablet    ELIQUIS    60 tablet    Take 1 tablet (2.5 mg) by mouth 2 times daily       bismuth subsalicylate 262 MG Tabs     80 tablet    Take 1 tablet by mouth every 4 hours as needed       Blood Pressure Monitor Kit     1 kit    1 kit daily as needed       calcium-vitamin D 600-400 MG-UNIT per tablet    CALTRATE     Take 1 tablet by mouth 2 times daily       Carboxymethylcellulose Sodium 1 % Gel      1-2 drops (aka Genteal)       ciprofloxacin 250 MG tablet    CIPRO    90 tablet    Take 1 tablet (250 mg) by mouth daily       Cranberry 180 MG Caps      Take 1 capsule by mouth daily Unsure of strength       fish oil-omega-3 fatty acids  1000 MG capsule      Take 2 g by mouth daily. 2 capsules daily       FLAGYL PO      Take 500 mg by mouth 2 times daily       folic acid 1 MG tablet    FOLVITE    100 tablet    Take 1 tablet (1 mg) by mouth daily       GENTEAL MILD OP      Apply  to eye daily.       IBANdronate 150 MG tablet    BONIVA    3 tablet    Take 1 tablet (150 mg) by mouth every 30 days       levothyroxine 75 MCG tablet    SYNTHROID/LEVOTHROID    90 tablet    Take 1 tablet (75 mcg) by mouth daily       losartan 25 MG tablet    COZAAR    90 tablet    Take 1 tablet (25 mg) by mouth daily (with dinner) Hold if SBP less than 110.       LUCENTIS IO      1 injection every 4 Weeks       metoprolol 25 MG 24 hr tablet    TOPROL-XL    45 tablet    Take 0.5 tablets (12.5 mg) by mouth daily       nitroglycerin 0.4 MG sublingual tablet    NITROSTAT    25 tablet    Place 1 tablet (0.4 mg) under the tongue every 5 minutes as needed for chest pain       omeprazole 20 MG CR capsule    priLOSEC    90 capsule    TAKE ONE CAPSULE BY MOUTH 30 MINUTES BEFORE BREAKFAST. DO NOT TAKE WITH LEVOTHYROXINE       * order for DME     1 Box    Equipment being ordered: Dispense face mask. Mrs. Spicer is immunosuppressed due to rheumatoid arthritis.       * order for DME     1 each    Equipment being ordered: Nebulizer. Use with Albuterol.       order for DME     1 each    Equipment being ordered: Dispense baffle, for use with nebulizer.       * order for DME     1 each    Equipment being ordered: Left wrist splint.       * order for DME     1 each    Equipment being ordered: Left wrist splint.       PRESERVISION AREDS PO      Take 2 tablets by mouth daily       REFRESH P.M. Oint      Apply  to eye. Daily at bedtime       saccharomyces boulardii 250 MG capsule    FLORASTOR     Take 250 mg by mouth       senna-docusate 8.6-50 MG per tablet    SENOKOT-S;PERICOLACE    120 tablet    Take 2 tablets by mouth At Bedtime Hold if diarrhea occurs.       SPIRONOLACTONE PO      Take 25 mg  by mouth every morning Hold if SBP is less than 120.       triamcinolone 0.1 % cream    KENALOG    453.6 g    Apply sparingly to affected area on face three times daily.       ZYRTEC ALLERGY 10 MG tablet   Generic drug:  cetirizine      Take 10 mg by mouth At Bedtime       * Notice:  This list has 4 medication(s) that are the same as other medications prescribed for you. Read the directions carefully, and ask your doctor or other care provider to review them with you.

## 2017-03-31 NOTE — PROGRESS NOTES
"Infusion Nursing Note:  Linda KATEY Orellana presents today for Orencia.    Patient seen by provider today: No   present during visit today: Not Applicable.    Note: N/A.    Intravenous Access:  Peripheral IV placed.    Treatment Conditions:  Rheumatology Infusion Checklist: PRIOR TO INFUSION OF BIOLOGICAL MEDICATIONS OR ANY OF THESE AS LISTED: Orencia (abatacept) \".rheumbiologicalchecklist\"    Prior to Infusion of biological medications or any of these as listed:    1. Elevated temperature, fever, chills, productive cough or abnormal vital signs, night sweats, coughing up blood or sputum, no appetite or abnormal vital signs : NO    2. Open wounds or new incisions: NO    3. Recent hospitalization: NO    4.  Recent surgeries:  NO    5. Any upcoming surgeries or dental procedures?:NO    6. Any current or recent bouts of illness or infection? On any antibiotics? : NO    7. Any new, sudden or worsening abdominal pain :NO    8. Vaccination within 4 weeks? Patient or someone in the household is scheduled to receive vaccination? No live virus vaccines prior to or during treatment :NO    9. Any nervous system diseases [i.e. multiple sclerosis, Guillain-Garrochales, seizures, neurological  changes]: NO    10. Pregnant or breast feeding; or plans on pregnancy in the future: NO    11. Signs of worsening depression or suicidal ideations while taking benlysta:NO    12. New-onset medical symptoms: NO    13.  New cancer diagnosis or on chemotherapy or radiation NO    14.  Evaluate for any sign of active TB [Unexplained weight loss, Loss of appetite, Night sweats, Fever, Fatigue, Chills, Coughing for 3 weeks or longer, Hemoptysis (coughing up blood), Chest pain]: NO    **Note: If answered yes to any of the above, hold the infusion and contact ordering rheumatologist or on-call rheumatologist.   .      Post Infusion Assessment:  Patient tolerated infusion without incident.    Discharge Plan:   Copy of AVS reviewed with patient " and/or family.  Patient will return 4/28/17 for next appointment.  Patient discharged in stable condition accompanied by: daughter.  Departure Mode: Ambulatory.    Isidra Rojas RN

## 2017-04-03 ENCOUNTER — CARE COORDINATION (OUTPATIENT)
Dept: CARE COORDINATION | Facility: CLINIC | Age: 82
End: 2017-04-03

## 2017-04-03 DIAGNOSIS — I10 HYPERTENSION GOAL BP (BLOOD PRESSURE) < 140/90: ICD-10-CM

## 2017-04-03 NOTE — PROGRESS NOTES
Her systolic  blood pressure should range between 143--140 for her age, hence she doesn't feel with her current blood pressure reading.  Reduce Losartan to 12.5 mg once daily (take 1/2 tablet of Losartan 25 mg once daily).

## 2017-04-03 NOTE — PROGRESS NOTES
"Clinic Care Coordination Contact  OUTREACH    Referral Information:  Referral Source: Self-patient/Caregiver  Reason for Contact: RN call to patient   Care Conference: No     Universal Utilization:   ED Visits in last year: 9  Hospital visits in last year: 4  Last PCP appointment: 03/24/17  Missed Appointments: 1  Concerns: no  Multiple Providers or Specialists: yes    Clinical Concerns:  Current Medical Concerns: Patient with multiple medical issues including rheumatoid arthritis, pulmonary fibrosis, CHF, sick sinus syndrome s/p pacemaker placement, HTN.  Patient reports diarrhea has resolved since last office visit 3/24/17, diagnosis was Norovirus.  She states this morning she awoke and \"I just didn't feel well.\"  Patient denies feeling dizzy or lightheaded.  Patient took her blood pressure and reports a reading of 108/50.  Patient states that since her blood pressure was lower she did not take her spironolactone and ate some salt and drank water.  Patient reports feeling slightly better after this and a repeat blood pressure of 116/52.  Patient informed writer that Dr. Lockett advised patient to hold her spironolactone and consume salt and water if she ever experienced a low blood pressure along with not feeling well.  Patient reports this is the 2nd occurrence since her pacemaker placement.  Patient states she will contact Dr. Lockett and/or her cardiologist if this occurs again or if she experiences any new symptoms.  RN CC will forward to PCP as fyi.     Current Behavioral Concerns: n/a    Education Provided to patient: RN CC reviewed when to contact provider for persistent, new or worsening symptoms.   Clinical Pathway Name: None  Clinical Pathway: none    Medication Management:  Patient is independent in medication management and sets up her medications weekly in a pill box.  Patient did not take her spironolactone today due to a blood pressure of 108/50 and a general ill feeling.     Functional " Status:  Mobility Status: Independent  Equipment Currently Used at Home: raised toilet, shower chair  Transportation: Patient's family provides transportation and she has [employer] if needed.           Psychosocial:  Current living arrangement: lives in a private home with spouse (Live in a Senior Apartment)  Financial/Insurance: No concerns at this time.       Resources and Interventions:  Current Resources:  ; Other (see comment) (Heart Failure Action Plan)        Advanced Care Plans/Directives on file:: In process        Goals:   Goal 1 Statement: I will complete my health care directive.  Goal 1 Progression Percent: 50%  Goal 1 Progression Date: 04/03/17              Barriers: Recent illnesses  Strengths: Patient has demonstrated that she is independent in following through with goals.  Patient attended a Advance Care Planning class on 3/23/17.  Patient/Caregiver understanding: Patient verbalized understanding of when to contact her providers.  Frequency of Care Coordination: as needed  Upcoming appointment: 04/28/17 (Infusion)     Plan:   Patient will continue to follow treatment plan as directed and follow up with PCP with concerns ongoing.   RN CC will forward update to PCP as FYI.  Patient will complete her health care directive.    RN CC will remain available to patient and care team as needed.      Melissa Behl BSN, RN, PHN  [facility] Care Coordinator  [phone]  [email or web address]

## 2017-04-03 NOTE — PROGRESS NOTES
Noted.    Melissa Behl BSN, RN, PHN  [facility] Care Coordinator  [phone]  [email or web address]

## 2017-04-19 ENCOUNTER — TELEPHONE (OUTPATIENT)
Dept: CARDIOLOGY | Facility: CLINIC | Age: 82
End: 2017-04-19

## 2017-04-19 DIAGNOSIS — I10 HYPERTENSION GOAL BP (BLOOD PRESSURE) < 140/90: ICD-10-CM

## 2017-04-19 NOTE — TELEPHONE ENCOUNTER
Patient calling because she had some salt over the weekend and her blood pressure has been running a little high. She had gained two pounds but she has lost it. Does have shortness of breath. No complaints of edema. BP over last few days has been 148/71, 138/73, and today 157/70. Pulse still runs. Advised to check BP daily a few hours after she takes her mediations and record. l send to Dr Loza for recommendations.    Date: 4/20/2017    Time of Call: 9:34 AM     Diagnosis:  Hypertension     [  ] Ordering provider: Dr Loza    Order: Increase the Losartan to 50 mg qd.      Order received by: BRANDI Berger     Follow-up/additional notes:

## 2017-04-20 RX ORDER — LOSARTAN POTASSIUM 25 MG/1
50 TABLET ORAL
Qty: 180 TABLET | Refills: 3 | Status: SHIPPED | OUTPATIENT
Start: 2017-04-20 | End: 2017-05-04

## 2017-04-20 NOTE — TELEPHONE ENCOUNTER
Called and spoke to patient. She checked her BP this am and it was 117/52 before medications and two hours later is was 142/68.   Date: 4/20/2017    Time of Call: 2:12 PM     Diagnosis:  hypertension     [  ] Ordering provider: Dr Loza    Order: Increase the Losartan to 50 mg qd     Order received by: BRANDI Berger     Follow-up/additional notes: Linda will increase to 50 mg daily. Pharmacy notified. She will continue to monitor her BP and call in two weeks with status report.

## 2017-04-20 NOTE — TELEPHONE ENCOUNTER
Pt left message on [facility] voicemail to speak to Stacey.     Ellen HOPKINS RN, BSN  Pulmonary Care Coordinator

## 2017-04-28 ENCOUNTER — INFUSION THERAPY VISIT (OUTPATIENT)
Dept: INFUSION THERAPY | Facility: CLINIC | Age: 82
End: 2017-04-28
Payer: MEDICARE

## 2017-04-28 VITALS
DIASTOLIC BLOOD PRESSURE: 61 MMHG | HEART RATE: 63 BPM | TEMPERATURE: 98.3 F | BODY MASS INDEX: 27.81 KG/M2 | OXYGEN SATURATION: 99 % | SYSTOLIC BLOOD PRESSURE: 131 MMHG | RESPIRATION RATE: 16 BRPM | WEIGHT: 157 LBS

## 2017-04-28 DIAGNOSIS — M05.79 RHEUMATOID ARTHRITIS INVOLVING MULTIPLE SITES WITH POSITIVE RHEUMATOID FACTOR (H): Primary | ICD-10-CM

## 2017-04-28 PROCEDURE — 96365 THER/PROPH/DIAG IV INF INIT: CPT | Performed by: INTERNAL MEDICINE

## 2017-04-28 PROCEDURE — 99207 ZZC NO CHARGE LOS: CPT

## 2017-04-28 RX ADMIN — Medication 250 ML: at 09:54

## 2017-04-28 NOTE — MR AVS SNAPSHOT
After Visit Summary   4/28/2017    Linda Spicer    MRN: [id number]           Patient Information     Date Of Birth          6/13/1931        Visit Information        Provider Department      4/28/2017 9:30 AM [address]        Today's Diagnoses     Rheumatoid arthritis involving multiple sites with positive rheumatoid factor (H)    -  1      Care Instructions    POST-INFUSION OF BIOLOGICAL MEDICATION:    Reviewed with patient.  Given biologic medication or medication hand-out. Inform patient if any fever, chills or signs of infection, new symptoms, abdominal pain, heart palpitations, shortness of breath, reaction, weakness, neurological changes, seek medical attention immediately and should not receive infusions. No live virus vaccines prior to or during treatment or up to 6 months post infusion. If the patient has an upcoming procedure or surgery, this should be discussed with the rheumatologist and surgeon or provider.        April 2017 Sunday Monday Tuesday Wednesday Thursday Friday Saturday                                 1       2     3     4     5     6     7     8       9     10     11     12     13     14     15       16     17     18     19     20     21     22       23     24     25     26     27     28     LEVEL 0    9:30 AM   (30 min.)   [address] 29       30                                              May 2017   [place]vany Monday Tuesday Wednesday Thursday Friday Saturday        1     2     3     4     5     6       7     8     9     10     11     LAB    9:30 AM   (15 min.)   BK LAB   [facility] 12     13       14     15     16     RETURN    9:20 AM   (20 min.)   Mario Davenport MD   [facility] 17     18     19     20       21     22     23     24     25     26     LEVEL 0    9:30 AM   (30 min.)   [address] 27 28 29     30     31                               No results found for this or any previous visit (from the past 24 hour(s)).            Follow-ups after your visit        Your next 10 appointments already scheduled     May 11, 2017  9:30 AM CDT   LAB with BK LAB   [facility])    [address] [facility] [address]   [phone]           Patient must bring picture ID.  Patient should be prepared to give a urine specimen  Please do not eat 10-12 hours before your appointment if you are coming in fasting for labs on lipids, cholesterol, or glucose (sugar).  Pregnant women should follow their Care Team instructions. Water with medications is okay. Do not drink coffee or other fluids.   If you have concerns about taking  your medications, please ask at office or if scheduling via [employer], send a message by clicking on Secure Messaging, Message Your Care Team.            May 16, 2017  9:20 AM CDT   Return Visit with Mario Davenport MD   [facility])    [address] [facility] [address]   [phone]            May 26, 2017  9:30 AM CDT   Level O with BAY 9 INFUSION   [facility]    [address] [facility] [address]   [phone]            Jun 07, 2017  9:00 AM CDT   Nurse Only with DEVICE CHECK RN CARD   [facility]    [address] [facility] [address]   [phone]            Jun 23, 2017  9:30 AM CDT   Level O with BAY 9 INFUSION   [facility]    [address] [facility] [address]   [phone]            Sep 11, 2017  1:10 PM CDT   Return Visit with Emeterio Loza MD   [facility])    [address] [facility] [address]   [phone]              Who to  contact     If you have questions or need follow up information about today's clinic visit or your schedule please contact [facility] directly at [phone].  Normal or non-critical lab and imaging results will be communicated to you by [employer], letter or phone within 4 business days after the clinic has received the results. If you do not hear from us within 7 days, please contact the clinic through [employer] or phone. If you have a critical or abnormal lab result, we will notify you by phone as soon as possible.  Submit refill requests through [employer] or call your pharmacy and they will forward the refill request to us. Please allow 3 business days for your refill to be completed.          Additional Information About Your Visit        [employer] Information     [employer] gives you secure access to your electronic health record. If you see a primary care provider, you can also send messages to your care team and make appointments. If you have questions, please call your primary care clinic.  If you do not have a primary care provider, please call [phone] and they will assist you.      [employer] is an electronic gateway that provides easy, online access to your medical records. With [employer], you can request a clinic appointment, read your test results, renew a prescription or communicate with your care team.     To access your existing account, please contact your [facility] or call [phone] for assistance.        Care EveryWhere ID     This is your Care EveryWhere ID. This could be used by other organizations to access your [facility] medical records  [id number]        Your Vitals Were     Pulse Temperature Respirations Pulse Oximetry BMI (Body Mass Index)       63 98.3  F (36.8  C) (Oral) 16 99% 27.81 kg/m2        Blood Pressure from Last 3 Encounters:   04/28/17 131/61   03/31/17 146/72   03/24/17 110/72    Weight from Last 3 Encounters:   04/28/17 71.2 kg (157  lb)   03/31/17 71.4 kg (157 lb 8 oz)   03/24/17 71.7 kg (158 lb)              We Performed the Following     Treatment Conditions     Treatment Conditions        Primary Care Provider Office Phone # Fax #    Tre Lockett [phone]       [facility] [address]  [facility] [address]        Thank you!     Thank you for choosing [facility]  for your care. Our goal is always to provide you with excellent care. Hearing back from our patients is one way we can continue to improve our services. Please take a few minutes to complete the written survey that you may receive in the mail after your visit with us. Thank you!             Your Updated Medication List - Protect others around you: Learn how to safely use, store and throw away your medicines at www.disposemymeds.org.          This list is accurate as of: 4/28/17  9:57 AM.  Always use your most recent med list.                   Brand Name Dispense Instructions for use    apixaban ANTICOAGULANT 2.5 MG tablet    ELIQUIS    60 tablet    Take 1 tablet (2.5 mg) by mouth 2 times daily       bismuth subsalicylate 262 MG Tabs     80 tablet    Take 1 tablet by mouth every 4 hours as needed       Blood Pressure Monitor Kit     1 kit    1 kit daily as needed       calcium-vitamin D 600-400 MG-UNIT per tablet    CALTRATE     Take 1 tablet by mouth 2 times daily       Carboxymethylcellulose Sodium 1 % Gel      1-2 drops (aka Genteal)       ciprofloxacin 250 MG tablet    CIPRO    90 tablet    Take 1 tablet (250 mg) by mouth daily       Cranberry 180 MG Caps      Take 1 capsule by mouth daily Unsure of strength       fish oil-omega-3 fatty acids 1000 MG capsule      Take 2 g by mouth daily. 2 capsules daily       FLAGYL PO      Take 500 mg by mouth 2 times daily       folic acid 1 MG tablet    FOLVITE    100 tablet    Take 1 tablet (1 mg) by mouth daily       GENTEAL MILD OP      Apply  to eye daily.        IBANdronate 150 MG tablet    BONIVA    3 tablet    Take 1 tablet (150 mg) by mouth every 30 days       levothyroxine 75 MCG tablet    SYNTHROID/LEVOTHROID    90 tablet    Take 1 tablet (75 mcg) by mouth daily       losartan 25 MG tablet    COZAAR    180 tablet    Take 2 tablets (50 mg) by mouth daily (with dinner) Hold if SBP less than 110.       LUCENTIS IO      1 injection every 4 Weeks       metoprolol 25 MG 24 hr tablet    TOPROL-XL    45 tablet    Take 0.5 tablets (12.5 mg) by mouth daily       nitroglycerin 0.4 MG sublingual tablet    NITROSTAT    25 tablet    Place 1 tablet (0.4 mg) under the tongue every 5 minutes as needed for chest pain       omeprazole 20 MG CR capsule    priLOSEC    90 capsule    TAKE ONE CAPSULE BY MOUTH 30 MINUTES BEFORE BREAKFAST. DO NOT TAKE WITH LEVOTHYROXINE       * order for DME     1 Box    Equipment being ordered: Dispense face mask. Mrs. Spicer is immunosuppressed due to rheumatoid arthritis.       * order for DME     1 each    Equipment being ordered: Nebulizer. Use with Albuterol.       order for DME     1 each    Equipment being ordered: Dispense baffle, for use with nebulizer.       * order for DME     1 each    Equipment being ordered: Left wrist splint.       * order for DME     1 each    Equipment being ordered: Left wrist splint.       PRESERVISION AREDS PO      Take 2 tablets by mouth daily       REFRESH P.M. Oint      Apply  to eye. Daily at bedtime       saccharomyces boulardii 250 MG capsule    FLORASTOR     Take 250 mg by mouth       senna-docusate 8.6-50 MG per tablet    SENOKOT-S;PERICOLACE    120 tablet    Take 2 tablets by mouth At Bedtime Hold if diarrhea occurs.       SPIRONOLACTONE PO      Take 25 mg by mouth every morning Hold if SBP is less than 120.       triamcinolone 0.1 % cream    KENALOG    453.6 g    Apply sparingly to affected area on face three times daily.       ZYRTEC ALLERGY 10 MG tablet   Generic drug:  cetirizine      Take 10 mg by mouth At  Bedtime       * Notice:  This list has 4 medication(s) that are the same as other medications prescribed for you. Read the directions carefully, and ask your doctor or other care provider to review them with you.

## 2017-04-28 NOTE — PATIENT INSTRUCTIONS
POST-INFUSION OF BIOLOGICAL MEDICATION:    Reviewed with patient.  Given biologic medication or medication hand-out. Inform patient if any fever, chills or signs of infection, new symptoms, abdominal pain, heart palpitations, shortness of breath, reaction, weakness, neurological changes, seek medical attention immediately and should not receive infusions. No live virus vaccines prior to or during treatment or up to 6 months post infusion. If the patient has an upcoming procedure or surgery, this should be discussed with the rheumatologist and surgeon or provider.        April 2017 Sunday Monday Tuesday Wednesday Thursday Friday Saturday                                 1       2     3     4     5     6     7     8       9     10     11     12     13     14     15       16     17     18     19     20     21     22       23     24     25     26     27     28     LEVEL 0    9:30 AM   (30 min.)   [address] 29       30                                              May 2017   Y[place]any Monday Tuesday Wednesday Thursday Friday Saturday        1     2     3     4     5     6       7     8     9     10     11     LAB    9:30 AM   (15 min.)   BK LAB   [facility] 12     13       14     15     16     RETURN    9:20 AM   (20 min.)   Mario Davenport MD   [facility] 17     18     19     20       21     22     23     24     25     26     LEVEL 0    9:30 AM   (30 min.)   [address] 27       28     29     30     31                              No results found for this or any previous visit (from the past 24 hour(s)).

## 2017-04-28 NOTE — PROGRESS NOTES
"Infusion Nursing Note:  Linda KATEY Orellana presents today for Orencia.    Patient seen by provider today: No   present during visit today: Not Applicable.    Note:   Last orencia 3/31; tolerated well; ordered every 4 weeks.     PRIOR TO INFUSION OF BIOLOGICAL MEDICATIONS OR ANY OF THESE AS LISTED: Orencia (abatacept) \".rheumbiologicalchecklist\"    Prior to Infusion of biological medications or any of these as listed:    1. Elevated temperature, fever, chills, productive cough or abnormal vital signs, night sweats, coughing up blood or sputum, no appetite or abnormal vital signs : NO    2. Open wounds or new incisions: NO    3. Recent hospitalization: NO    4.  Recent surgeries:  NO    5. Any upcoming surgeries or dental procedures?:NO    6. Any current or recent bouts of illness or infection? On any antibiotics? : NO    7. Any new, sudden or worsening abdominal pain :NO    8. Vaccination within 4 weeks? Patient or someone in the household is scheduled to receive vaccination? No live virus vaccines prior to or during treatment :NO    9. Any nervous system diseases [i.e. multiple sclerosis, Guillain-Melrose, seizures, neurological  changes]: NO    10. Pregnant or breast feeding; or plans on pregnancy in the future: NO    11. Signs of worsening depression or suicidal ideations while taking benlysta:YES    12. New-onset medical symptoms: NO    13.  New cancer diagnosis or on chemotherapy or radiation NO    14.  Evaluate for any sign of active TB [Unexplained weight loss, Loss of appetite, Night sweats, Fever, Fatigue, Chills, Coughing for 3 weeks or longer, Hemoptysis (coughing up blood), Chest pain]: NO    **Note: If answered yes to any of the above, hold the infusion and contact ordering rheumatologist or on-call rheumatologist.   .    Intravenous Access:  Peripheral IV placed.    Treatment Conditions:  Not Applicable.      Post Infusion Assessment:  Patient tolerated infusion without incident.  Blood return " noted pre and post infusion.  No evidence of extravasations.  Access discontinued per protocol.    Discharge Plan:   Copy of AVS reviewed with patient and/or family.  Patient will return 5/16 to see Dr. Davenport and 5/26 for next orencia for next appointment.  Patient discharged in stable condition accompanied by: self and daughter.  Departure Mode: Ambulatory.    Charisma Lazo RN

## 2017-05-03 ENCOUNTER — CARE COORDINATION (OUTPATIENT)
Dept: CARE COORDINATION | Facility: CLINIC | Age: 82
End: 2017-05-03

## 2017-05-03 NOTE — PROGRESS NOTES
Clinic Care Coordination Contact  OUTREACH    Referral Information:  Referral Source: Self-patient/Caregiver  Reason for Contact: RN CC call to patient for follow up.  Care Conference: Yes     Universal Utilization:   ED Visits in last year: 9  Hospital visits in last year: 4  Last PCP appointment: 03/24/17  Missed Appointments: 1  Concerns: no  Multiple Providers or Specialists: yes    Clinical Concerns:  Current Medical Concerns: Patient reports doing well.  She has increased her losartan to 50mg daily and is tolerating well.  Patient reports new symptoms of eye burning/pain and has a call into her ophthalmologist, as she receives eye injections due to her macular degeneration.  Patient continues to receive infusions for her rheumatoid arthritis and will be seeing her rheumatologist 5/16/17.  Patient denies any questions or concerns at this time, but would appreciate care coordination involvement.    Current Behavioral Concerns: n/a    Education Provided to patient: none this contact, patient continuing to work on goal of completing health care directive.   Clinical Pathway Name: None    Medication Management:  Patient is independent in medication management and sets up her medications weekly in a pill box.      Functional Status:  Mobility Status: Independent  Equipment Currently Used at Home: raised toilet, shower chair  Transportation: Patient's family provides and she also has [employer] if needed.           Psychosocial:  Current living arrangement:: I live in a private home with spouse (Live in a Senior Apartment)  Financial/Insurance: No concerns at this time.       Resources and Interventions:  Current Resources:  ; Other (see comment) (Heart Failure Action Plan)        Advanced Care Plans/Directives on file:: In process        Goals:   Goal 1 Statement: I will complete my health care directive.  Goal 1 Progression Percent: 50%  Goal 1 Progression Date: 05/03/17              Barriers: Patient dependent  on family for transportation to have health care directive notorized.  Strengths: Patient has been actively contacting care coordination when needed, has great family support.  Patient/Caregiver understanding: Patient verbalized understanding of her current medical conditions and treatments.  Patient denies any questions or concerns this contact, but would appreciate ongoing care coordination involvement due to her multiple chronic health conditions.  Frequency of Care Coordination: as needed  Upcoming appointment: 05/11/17 (labs)     Plan:   Patient will continue to follow treatment plan as directed and follow up with PCP with concerns ongoing.   Patient will have her health care directive completed.  RN CC will follow up with patient in 1 month.    Melissa Behl BSN, RN, N  [facility] Care Coordinator  [phone]  [email or web address]

## 2017-05-03 NOTE — LETTER
[facility]  Complex Care Plan  About Me  Patient Name:  Linda Walter    YOB: 1931  Age:   85 year old   [facility] MRN: [id number] Telephone Information:     Home Phone [phone]   Mobile [phone]       Address:    [address] [place] [address] [address]   [place] [address] Email address:  [email or web address]      Emergency Contact(s)  Name Relationship Lgl Grd Work Phone Home Phone Mobile Phone   1. JENNIFER GAONA Daughter No NONE NONE [phone]   2. LEA WALTER Spouse No none [phone]   3. KAROLINA WALTER Son No NONE NONE [phone]   4. CAITY WALTER Son No none [phone]           Primary language:  English     needed? No   [facility]:  [phone] op. 1  Other communication barriers: No  Preferred Method of Communication:  Phone  Current living arrangement: I live in a private home with spouse (Lives in a Senior Apartment)  Mobility Status/ Medical Equipment: Independent  Other information to know about me:    Health Maintenance  Health Maintenance Reviewed: Up to date    My Access Plan  Medical Emergency 911   Primary Clinic Line [facility]- [phone]   24 Hour Appointment Line [phone] or  [phone]) (toll-free)   24 Hour Nurse Line [phone] (toll-free)   Preferred Urgent Care [facility], [phone]   [facility]  [phone]   Preferred Pharmacy [facility] [place], [place] - [address]     Behavioral Health Crisis Line [facility], [phone] or 911     My Care Team Members  Patient Care Team       Relationship Specialty Notifications Start End    Tre Lockett MD PCP - General Internal Medicine  12/14/15     Phone: [phone] Fax: [phone]         [facility] [address] [facility] [place] [address]    [place]  Joey Paniagua MD MD Family Medicine - Sports Medicine  9/10/15     Phone: [phone] Fax: [phone]         [facility] [address] [facility] [address]    Fransico Toussaint MD MD Orthopaedic Surgery  9/10/15     Phone: [phone] Fax: [phone]          [facility] [address] [facility] [address]         My Care Plans  Self Management and Treatment Plan  Goals and (Comments)  Goal #1: I will complete my health care directive.      50% of goal reached    Action Plans on File: None  Advance Care Plans/Directives Type:        My Medical and Care Information  Problem List   Patient Active Problem List   Diagnosis     ACP (advance care planning)     Hypertension goal BP (blood pressure) < 150/90     Hypothyroid     Diffuse idiopathic pulmonary fibrosis (H)     Macular degeneration, left eye     Irritable bowel syndrome     Encounter for palliative care     Adjustment disorder with anxious mood     Mild anemia     DDD (degenerative disc disease), lumbar     CKD (chronic kidney disease) stage 3, GFR 30-59 ml/min     History of blood transfusion     Aftercare following surgery     S/P lumbar laminectomy     High risk medication use     Osteopenia     Atrophic vaginitis     Fecal incontinence     Female stress incontinence     Impingement syndrome of both shoulders     UIP (usual interstitial pneumonitis) (H)     High risk medications (not anticoagulants) long-term use     Heart failure with preserved ejection fraction (H)     Congestive heart failure with preserved LV function, NYHA class 3 (H)     Other specified hypothyroidism     Rheumatoid arthritis involving multiple sites with positive rheumatoid factor (H)     Health Care Home     Sick sinus syndrome (H)      Current Medications and Allergies:  See printed Medication Report.    Care Coordination Start Date: 04/06/16   Frequency of Care Coordination: as needed   Form Last Updated: 05/03/2017

## 2017-05-04 ENCOUNTER — TELEPHONE (OUTPATIENT)
Dept: NURSING | Facility: CLINIC | Age: 82
End: 2017-05-04

## 2017-05-04 DIAGNOSIS — I10 HYPERTENSION GOAL BP (BLOOD PRESSURE) < 140/90: ICD-10-CM

## 2017-05-04 RX ORDER — LOSARTAN POTASSIUM 25 MG/1
50 TABLET ORAL
Qty: 180 TABLET | Refills: 3 | Status: SHIPPED | OUTPATIENT
Start: 2017-05-04 | End: 2017-08-30 | Stop reason: DRUGHIGH

## 2017-05-04 NOTE — TELEPHONE ENCOUNTER
Patient calling to report recent BP readings. Running 130's/60. Feels great . She will continue on the 50 mg daily of Losartan and call with problems.     Medication requested: losartan  Pharmacy Requested: [facility]   Pt's last office visit: 3/6/16  Next scheduled office visit: 9/11/17      Refill authorized per protocol  Stacey Perez RN  Cardiology Care Coordinator  [facility]  Phone: [phone]

## 2017-05-11 DIAGNOSIS — Z79.899 HIGH RISK MEDICATION USE: ICD-10-CM

## 2017-05-11 DIAGNOSIS — M05.79 RHEUMATOID ARTHRITIS INVOLVING MULTIPLE SITES WITH POSITIVE RHEUMATOID FACTOR (H): ICD-10-CM

## 2017-05-11 LAB
ALBUMIN SERPL-MCNC: 3.4 G/DL (ref 3.4–5)
ALP SERPL-CCNC: 70 U/L (ref 40–150)
ALT SERPL W P-5'-P-CCNC: 29 U/L (ref 0–50)
AST SERPL W P-5'-P-CCNC: 23 U/L (ref 0–45)
BASOPHILS # BLD AUTO: 0 10E9/L (ref 0–0.2)
BASOPHILS NFR BLD AUTO: 0.3 %
BILIRUB DIRECT SERPL-MCNC: <0.1 MG/DL (ref 0–0.2)
BILIRUB SERPL-MCNC: 0.4 MG/DL (ref 0.2–1.3)
CREAT SERPL-MCNC: 1.18 MG/DL (ref 0.52–1.04)
CRP SERPL-MCNC: <2.9 MG/L (ref 0–8)
DIFFERENTIAL METHOD BLD: ABNORMAL
EOSINOPHIL # BLD AUTO: 0.2 10E9/L (ref 0–0.7)
EOSINOPHIL NFR BLD AUTO: 2.1 %
ERYTHROCYTE [DISTWIDTH] IN BLOOD BY AUTOMATED COUNT: 13.2 % (ref 10–15)
ERYTHROCYTE [SEDIMENTATION RATE] IN BLOOD BY WESTERGREN METHOD: 27 MM/H (ref 0–30)
GFR SERPL CREATININE-BSD FRML MDRD: 43 ML/MIN/1.7M2
HCT VFR BLD AUTO: 33 % (ref 35–47)
HGB BLD-MCNC: 10.9 G/DL (ref 11.7–15.7)
LYMPHOCYTES # BLD AUTO: 2 10E9/L (ref 0.8–5.3)
LYMPHOCYTES NFR BLD AUTO: 28.8 %
MCH RBC QN AUTO: 32.3 PG (ref 26.5–33)
MCHC RBC AUTO-ENTMCNC: 33 G/DL (ref 31.5–36.5)
MCV RBC AUTO: 98 FL (ref 78–100)
MONOCYTES # BLD AUTO: 0.7 10E9/L (ref 0–1.3)
MONOCYTES NFR BLD AUTO: 10.6 %
NEUTROPHILS # BLD AUTO: 4.1 10E9/L (ref 1.6–8.3)
NEUTROPHILS NFR BLD AUTO: 58.2 %
PLATELET # BLD AUTO: 227 10E9/L (ref 150–450)
PROT SERPL-MCNC: 7.1 G/DL (ref 6.8–8.8)
RBC # BLD AUTO: 3.37 10E12/L (ref 3.8–5.2)
WBC # BLD AUTO: 7 10E9/L (ref 4–11)

## 2017-05-11 PROCEDURE — 82565 ASSAY OF CREATININE: CPT | Performed by: INTERNAL MEDICINE

## 2017-05-11 PROCEDURE — 36415 COLL VENOUS BLD VENIPUNCTURE: CPT | Performed by: INTERNAL MEDICINE

## 2017-05-11 PROCEDURE — 80076 HEPATIC FUNCTION PANEL: CPT | Performed by: INTERNAL MEDICINE

## 2017-05-11 PROCEDURE — 86140 C-REACTIVE PROTEIN: CPT | Performed by: INTERNAL MEDICINE

## 2017-05-11 PROCEDURE — 85025 COMPLETE CBC W/AUTO DIFF WBC: CPT | Performed by: INTERNAL MEDICINE

## 2017-05-11 PROCEDURE — 85652 RBC SED RATE AUTOMATED: CPT | Performed by: INTERNAL MEDICINE

## 2017-05-12 ENCOUNTER — MYC REFILL (OUTPATIENT)
Dept: FAMILY MEDICINE | Facility: CLINIC | Age: 82
End: 2017-05-12

## 2017-05-12 DIAGNOSIS — K12.0 ORAL APHTHAE: ICD-10-CM

## 2017-05-12 NOTE — TELEPHONE ENCOUNTER
[facility]  Original authorizing provider: MD Linda Michael would like a refill of the following medications:  folic acid (FOLVITE) 1 MG tablet [Tre Lockett MD]    Preferred pharmacy: [facility], [place] - [address]    Comment:      Medication renewals requested in this message routed to other providers:  metoprolol (TOPROL-XL) 25 MG 24 hr tablet [Ayesha Paniagua PA-C]

## 2017-05-12 NOTE — TELEPHONE ENCOUNTER
folic acid (FOLVITE) 1 MG tablet      Last Written Prescription Date: 1/27/17  Last Fill Quantity: 100,  # refills: 3   Last Office Visit with [facility], [facility] or [facility] prescribing provider: 3/24/17                                         Next 5 appointments (look out 90 days)     May 16, 2017  9:20 AM CDT   [facility]port [facility]   [facility]    [address] [facility] [address]   [phone]            Jul 05, 2017  9:00 AM CDT   Nurse Only with DEVICE CHECK RN CARD   [facility])    [id number] [place] [address]   [phone]

## 2017-05-16 ENCOUNTER — TELEPHONE (OUTPATIENT)
Dept: RHEUMATOLOGY | Facility: CLINIC | Age: 82
End: 2017-05-16

## 2017-05-16 ENCOUNTER — OFFICE VISIT (OUTPATIENT)
Dept: RHEUMATOLOGY | Facility: CLINIC | Age: 82
End: 2017-05-16
Payer: MEDICARE

## 2017-05-16 VITALS
DIASTOLIC BLOOD PRESSURE: 66 MMHG | HEIGHT: 63 IN | TEMPERATURE: 97.8 F | SYSTOLIC BLOOD PRESSURE: 140 MMHG | BODY MASS INDEX: 28.24 KG/M2 | WEIGHT: 159.4 LBS | HEART RATE: 66 BPM | OXYGEN SATURATION: 99 %

## 2017-05-16 DIAGNOSIS — M05.79 RHEUMATOID ARTHRITIS INVOLVING MULTIPLE SITES WITH POSITIVE RHEUMATOID FACTOR (H): Primary | ICD-10-CM

## 2017-05-16 DIAGNOSIS — M70.50 PES ANSERINE BURSITIS: ICD-10-CM

## 2017-05-16 DIAGNOSIS — Z79.899 HIGH RISK MEDICATION USE: ICD-10-CM

## 2017-05-16 PROCEDURE — 99214 OFFICE O/P EST MOD 30 MIN: CPT | Performed by: INTERNAL MEDICINE

## 2017-05-16 RX ORDER — FOLIC ACID 1 MG/1
1 TABLET ORAL DAILY
Qty: 100 TABLET | Refills: 1 | Status: SHIPPED | OUTPATIENT
Start: 2017-05-16 | End: 2017-11-27

## 2017-05-16 RX ORDER — AZATHIOPRINE 50 MG/1
50 TABLET ORAL DAILY
Qty: 30 TABLET | Refills: 3 | Status: SHIPPED | OUTPATIENT
Start: 2017-05-16 | End: 2017-08-01

## 2017-05-16 NOTE — TELEPHONE ENCOUNTER
Prescription approved per [facility].  Betsey Mendoza RN

## 2017-05-16 NOTE — NURSING NOTE
"Chief Complaint   Patient presents with     Arthritis     RA, patient states her right knee is bothering her, left knee hurt a little. Left wrist is bad, right thumb       Initial /64 (BP Location: Right arm, Patient Position: Chair, Cuff Size: Adult Regular)  Pulse 66  Temp 97.8  F (36.6  C) (Oral)  Ht 1.6 m (5' 3\")  Wt 72.3 kg (159 lb 6.4 oz)  SpO2 99%  BMI 28.24 kg/m2 Estimated body mass index is 28.24 kg/(m^2) as calculated from the following:    Height as of this encounter: 1.6 m (5' 3\").    Weight as of this encounter: 72.3 kg (159 lb 6.4 oz).  BP completed using cuff size: regular         RAPID3 (0-30) Cumulative Score            RAPID3 Weighted Score (divide #4 by 3 and that is the weighted score)           "

## 2017-05-16 NOTE — PROGRESS NOTES
Rheumatology Clinic Visit      Linda Spicer MRN# [id number]   YOB: 1931 Age: 85 year old      Date of visit: 5/16/17   PCP: Dr. Tre Lockett  Pulmonology: Dr. aSndra Comer  Cardiology: Dr. Emeterio Loza  Dermatology: Dr. Andrews Knott at [facility] in [place]     Chief Complaint   Patient presents with:  Arthritis: RA, patient states her right knee is bothering her, left knee hurt a little. Left wrist is bad, right thumb      Assessment and Plan     1. Seropositive Nonerosive Rheumatoid Arthritis (RF 99, CCP 52): Previously treated with hydroxychloroquine 200 mg daily (stopped previously because of macular degeneration), methotrexate (leg cramps), Cimzia (stopped due to recurrent basal cell carcinoma and hx of heart failure). Has sulfa allergy. Leflunomide avoided because of ILD.  Currently on Orencia IV.  Doing poorly.  Continue orencia for a longer duration.  Start azathioprine today.   - Continue orencia 750mg IV to be given at the [facility]  - Start azathioprine 50mg daily  - Labs in 2 weeks: TPMT  - Labs in 2 weeks, 4 weeks, and 8 weeks: CBC, Cr, Hepatic Panel  - Labs 2-3 days prior to the next rheumatology clinic visit: CBC, Creatinine, Hepatic Panel, ESR, CRP    # Azathioprine (Imuran) Risks and Benefits.  The risks and benefits of azathioprine were discussed in detail and the patient verbalized understanding.  The risks discussed include, but are not limited to, the risk for hypersensitivity, anaphylaxis, anaphylactoid reactions, the increased risk for malignancy, leukopenia, thrombocytopenia, anemia, hepatotoxicity (including increases in alkaline phosphatase, bilirubin, and transaminases), myalgia, infection, and fever.  People with genetic deficiency of TPMT are more sensitive to the myelosuppressive effects.  Myelosuppressive effects may occur more often if concurrently taking a xanthine oxidase inhibitor such as allopurinol.  I encouraged reviewing the  package insert and asking any questions about the medication.     2. Shoulder impingement syndrome, bilaterally: Maintaining ROM with exercises at home. She was previously referred to PT but did not go. Not an issue today.     3. Macular degeneration: listed here because of clinical significance; not managed in this clinic    4. History of basal cell carcinoma: Reportedly with one lesion removed in 2007 and recurrence in fall 2016.    5. Heart failure: She is followed by cardiology.  Has pacemaker per patient.     6. 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  2013 chest CT with contrast performed at [facility] documents UIP pattern. She was then seen by Dr. Comer on 3/14/2017.  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     7. Hypertersion: Blood pressure checked this clinic visit and was elevated; management per PCP/cardiology.    8. Bilateral pes anserine bursitis: The diagnosis and treatment options were discussed in detail today. She prefers to do physical therapy. She does not want to have steroid injections at this time  - Physical therapy referral    9. Bone Health: Managed by PCP already.     Ms. Spicer verbalized agreement with and understanding of the rational for the diagnosis and treatment plan.  All questions were answered to best of my ability and the patient's satisfaction. Ms. Spicer was advised to contact the clinic with any questions that may arise after the clinic visit.      Thank you for involving me in the care of the patient    Return to clinic: 3 months      HPI   Linda Spicer is a 85 year old female with a medical history significant for hypertension, heart failure, pulmonary fibrosis, audible bowel syndrome, degenerative disc disease of the lumbar spine status post laminectomy, chronic kidney disease, history of basal cell carcinoma, and rheumatoid arthritis who presents for follow-up of  rheumatoid arthritis.    Today, she reports doing poorly because of bilateral knee pain, R>L, and pain in her fingers. She believes that the medication changes have made her arthritis worse. Morning stiffness for at least 1 hour. There most affected joints include the knees that are worse with walking and when she lies in bed if one knee is on top of the other. Bilateral wrists hurt. Right thumb IP joint is tender and makes flexion and extension painful.    Denies fevers, chills, nausea, vomiting, constipation, diarrhea. No abdominal pain. No chest pain/pressure, palpitations.  Chronic SOB. No oral or nasal sores. No neck pain.  No LE swelling.  No photosensitivity or photophobia.  No sicca symptoms.  No eye pain or redness.     Tobacco: None  EtOH: rare  Drugs: none  Used to live in [place], [place] part time.    ROS   GEN: No fevers, chills, night sweats, or weight change  SKIN: See HPI  HEENT: No epistaxis. No oral or nasal ulcers.  CV: No chest pain, pressure, palpitations  PULM: No wheeze, cough.  GI: No nausea, vomiting, constipation, diarrhea. No blood in stool. No abdominal pain.  : No blood in urine.  MSK: See HPI.  NEURO: No numbness, tingling, or weakness.  EXT: No LE swelling  PSYCH: Negative    Active Problem List     Patient Active Problem List   Diagnosis     ACP (advance care planning)     Hypertension goal BP (blood pressure) < 150/90     Hypothyroid     Diffuse idiopathic pulmonary fibrosis (H)     Macular degeneration, left eye     Irritable bowel syndrome     Encounter for palliative care     Adjustment disorder with anxious mood     Mild anemia     DDD (degenerative disc disease), lumbar     CKD (chronic kidney disease) stage 3, GFR 30-59 ml/min     History of blood transfusion     Aftercare following surgery     S/P lumbar laminectomy     High risk medication use     Osteopenia     Atrophic vaginitis     Fecal incontinence     Female stress incontinence     Impingement syndrome of both shoulders      UIP (usual interstitial pneumonitis) (H)     High risk medications (not anticoagulants) long-term use     Heart failure with preserved ejection fraction (H)     Congestive heart failure with preserved LV function, NYHA class 3 (H)     Other specified hypothyroidism     Rheumatoid arthritis involving multiple sites with positive rheumatoid factor (H)     Health Care Home     Sick sinus syndrome (H)     Past Medical History     Past Medical History:   Diagnosis Date     Adjustment disorder with anxious mood 5/18/2015     Advanced directives, counseling/discussion 8/30/2012    Patient states has Advance Directive and will bring in a copy to clinic. 8/30/2012   Tevin May  [facility] Medical Assistant \       Anemia 9/25/2015     Basal cell cancer      CHF (congestive heart failure) (H) 9/18/2014     CKD (chronic kidney disease) stage 3, GFR 30-59 ml/min 9/29/2015     DDD (degenerative disc disease), lumbar 9/25/2015     Diffuse idiopathic pulmonary fibrosis (H) 5/6/2013     Encounter for palliative care 5/18/2015     History of blood transfusion 9/29/2015     Hypertension goal BP (blood pressure) < 140/90 9/7/2012     Hypothyroid 9/7/2012     Irritable bowel syndrome 10/29/2013     Macular degeneration      Macular degeneration, 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     Nondisplaced spiral fracture of shaft of humerus      Osteoporosis 8/13/2013     Imo Update utility     RA (rheumatoid arthritis) (H) 5/7/2013     Rheumatic fever      Shingles      Spinal stenosis of lumbar region with neurogenic claudication 9/14/2015     Past Surgical History     Past Surgical History:   Procedure Laterality Date     APPENDECTOMY       BIOPSY      hemorrhoidectomy     ENT SURGERY      tonsillectomy     GYN SURGERY      3 D & C's     HYSTERECTOMY, PAP NO LONGER INDICATED       LAMINECTOMY LUMBAR ONE LEVEL N/A 10/13/2015    Procedure: LAMINECTOMY LUMBAR ONE LEVEL;  Surgeon: Fransico Toussaint MD;  Location: [facility] OR     Allergy     Allergies   Allergen  Reactions     Cephalexin Hcl Diarrhea     Gabapentin Other (See Comments)     Dizzsiness     Naproxen GI Disturbance     Perfume      Lactase Other (See Comments)     Macrobid [Nitrofurantoin Anhydrous]      Possibly related to lung disease      Sulfa Drugs      Throat swelling     Xanax [Alprazolam] Other (See Comments)     Dizziness      Current Medication List     Current Outpatient Prescriptions   Medication Sig     losartan (COZAAR) 25 MG tablet Take 2 tablets (50 mg) by mouth daily (with dinner) Hold if SBP less than 110.     bismuth subsalicylate 262 MG TABS Take 1 tablet by mouth every 4 hours as needed     MetroNIDAZOLE (FLAGYL PO) Take 500 mg by mouth 2 times daily      metoprolol (TOPROL-XL) 25 MG 24 hr tablet Take 0.5 tablets (12.5 mg) by mouth daily     ciprofloxacin (CIPRO) 250 MG tablet Take 1 tablet (250 mg) by mouth daily     SPIRONOLACTONE PO Take 25 mg by mouth every morning Hold if SBP is less than 120.     folic acid (FOLVITE) 1 MG tablet Take 1 tablet (1 mg) by mouth daily     apixaban ANTICOAGULANT (ELIQUIS) 2.5 MG tablet Take 1 tablet (2.5 mg) by mouth 2 times daily     omeprazole (PRILOSEC) 20 MG CR capsule TAKE ONE CAPSULE BY MOUTH 30 MINUTES BEFORE BREAKFAST. DO NOT TAKE WITH LEVOTHYROXINE     senna-docusate (SENOKOT-S;PERICOLACE) 8.6-50 MG per tablet Take 2 tablets by mouth At Bedtime Hold if diarrhea occurs.     Cranberry 180 MG CAPS Take 1 capsule by mouth daily Unsure of strength     Carboxymethylcellulose Sodium 1 % GEL 1-2 drops (aka Genteal)     saccharomyces boulardii (FLORASTOR) 250 MG capsule Take 250 mg by mouth     levothyroxine (SYNTHROID, LEVOTHROID) 75 MCG tablet Take 1 tablet (75 mcg) by mouth daily     order for DME Equipment being ordered: Left wrist splint.     order for DME Equipment being ordered: Left wrist splint.     IBANdronate (BONIVA) 150 MG tablet Take 1 tablet (150 mg) by mouth every 30 days     order for DME Equipment being ordered: Dispense baffle, for use  with nebulizer.     order for DME Equipment being ordered: Nebulizer. Use with Albuterol.     order for DME Equipment being ordered: Dispense face mask.  Mrs. Spicer is immunosuppressed due to rheumatoid arthritis.     triamcinolone (KENALOG) 0.1 % cream Apply sparingly to affected area on face three times daily.     Multiple Vitamins-Minerals (PRESERVISION AREDS PO) Take 2 tablets by mouth daily     Blood Pressure Monitor KIT 1 kit daily as needed     nitroglycerin (NITROSTAT) 0.4 MG SL tablet Place 1 tablet (0.4 mg) under the tongue every 5 minutes as needed for chest pain     Ranibizumab (LUCENTIS IO) 1 injection every 4 Weeks     cetirizine (ZYRTEC ALLERGY) 10 MG tablet Take 10 mg by mouth At Bedtime     Hypromellose (GENTEAL MILD OP) Apply  to eye daily.     Artificial Tear Ointment (REFRESH P.M.) OINT Apply  to eye. Daily at bedtime        calcium-vitamin D (CALTRATE) 600-400 MG-UNIT per tablet Take 1 tablet by mouth 2 times daily      fish oil-omega-3 fatty acids (FISH OIL) 1000 MG capsule Take 2 g by mouth daily. 2 capsules daily          No current facility-administered medications for this visit.      Facility-Administered Medications Ordered in Other Visits   Medication     DOBUTamine 500 mg in dextrose 5% 250 mL (adult std)     DOBUTamine 500 mg in dextrose 5% 250 mL (adult std)       Social History   See HPI    Family History     Family History   Problem Relation Age of Onset     Hypertension Mother      Psychotic Disorder Father      DIABETES Son      DIABETES Daughter      Blood Disease Daughter      Physical Exam     Temp Readings from Last 3 Encounters:   04/28/17 98.3  F (36.8  C) (Oral)   03/31/17 98.5  F (36.9  C) (Oral)   03/24/17 98  F (36.7  C) (Oral)     BP Readings from Last 5 Encounters:   05/16/17 140/66   04/28/17 131/61   03/31/17 146/72   03/24/17 110/72   03/14/17 152/77     Pulse Readings from Last 1 Encounters:   04/28/17 63     Resp Readings from Last 1 Encounters:   04/28/17 16  "    Estimated body mass index is 27.81 kg/(m^2) as calculated from the following:    Height as of 3/24/17: 1.6 m (5' 3\").    Weight as of 4/28/17: 71.2 kg (157 lb).    GEN: NAD  HEENT: MMM. No oral lesions. Anicteric, noninjected sclera  CV: S1, S2. RRR. No m/r/g.  PULM: CTA bilaterally. No w/c.   MSK: Synovial swelling and tenderness to palpation in the bilateral second and third MCPs and second-fifth PIPs. Wrists tender to palpation bilaterally but without swelling. Elbows and shoulders without swelling or tenderness to palpation. Hips nontender to direct palpation. Knees without medial joint line tenderness, effusion, or increased warmth; however, she has tenderness to palpation over the bilateral pes anserine bursae.  Ankles without swelling or tenderness to palpation. Positive MTP squeeze bilaterally.   NEURO: UE and LE strengths 5/5 and equal bilaterally.   SKIN: No rash  EXT: No LE edema  PSYCH: Alert. Appropriate.    Labs / Imaging (select studies)   RF/CCP  Recent Labs   Lab Test  04/05/16   1036   CCPIGG  52*   RHF  99*     CBC  Recent Labs   Lab Test  05/11/17   0931  03/24/17   1401  02/16/17   0949   WBC  7.0  6.2  5.9   RBC  3.37*  3.64*  3.29*   HGB  10.9*  11.8  10.5*   HCT  33.0*  35.4  32.5*   MCV  98  97  99   RDW  13.2  12.7  12.4   PLT  227  224  256   MCH  32.3  32.4  31.9   MCHC  33.0  33.3  32.3   NEUTROPHIL  58.2  53.8  43.3   LYMPH  28.8  31.0  34.7   MONOCYTE  10.6  14.0  18.8   EOSINOPHIL  2.1  1.0  2.9   BASOPHIL  0.3  0.2  0.3   ANEU  4.1  3.3  2.5   ALYM  2.0  1.9  2.0   YEHUDA  0.7  0.9  1.1   AEOS  0.2  0.1  0.2   ABAS  0.0  0.0  0.0     CMP  Recent Labs   Lab Test  05/11/17   0931  03/24/17   1401  02/16/17   0949  12/22/16   1506   NA   --   138  138  139   POTASSIUM   --   3.7  4.7  4.8   CHLORIDE   --   104  103  105   CO2   --   23  27  26   ANIONGAP   --   11  8  8   GLC   --   96  89  146*   BUN   --   34*  30  37*   CR  1.18*  1.31*  1.24*  1.22*   GFRESTIMATED  43*  39*  41*  " 42*   GFRESTBLACK  53*  47*  50*  51*   FATOUMATA   --   8.7  8.5  9.2   BILITOTAL  0.4  0.5  0.3  0.4   ALBUMIN  3.4  3.5  3.6  3.9   PROTTOTAL  7.1  7.3  7.4  8.1   ALKPHOS  70  62  62  66   AST  23  34  21  22   ALT  29  36  21  31     Iron Studies  Recent Labs   Lab Test  02/16/17   0949  12/22/16   1506  01/29/16   1044  05/23/13   1718   BAILEE  60  42  38  37   IRON  54   --   66  82   FEB  322   --   365  324   IRONSAT  17   --   18  25     Calcium/VitaminD  Recent Labs   Lab Test  03/24/17   1401  02/16/17   0949  12/22/16   1506   FATOUMATA  8.7  8.5  9.2     ESR/CRP  Recent Labs   Lab Test  05/11/17   0931  02/16/17   0949  04/19/16   1800  04/05/16   1036   SED  27   --    --   28   CRP  <2.9  4.5  <2.9   --      TSH/T4  Recent Labs   Lab Test  02/16/17   0949  12/22/16   1506  08/01/16   0845  04/08/15   1148   TSH  0.93  0.83  0.64  0.60   T4   --    --   1.19  1.32     Lipid Panel  Recent Labs   Lab Test  06/03/16   0756  05/22/14   0821   CHOL  171  155   TRIG  75  84   HDL  47*  42*   LDL  109*  97   VLDL   --   17   CHOLHDLRATIO   --   3.7   NHDL  124   --      Hepatitis B  Recent Labs   Lab Test  04/05/16   1036   HBCAB  Nonreactive   HEPBANG  Nonreactive     Hepatitis C  Recent Labs   Lab Test  04/05/16   1036   HCVAB  Nonreactive   Assay performance characteristics have not been established for newborns,   infants, and children       Tuberculosis Screening  Recent Labs   Lab Test  02/21/17   1148  04/05/16   1037   TBRSLT  Negative  Negative   TBAGN  0.19  0.03     HIV Screening  Recent Labs   Lab Test  04/05/16   1036   HIAGAB  Nonreactive   HIV-1 p24 Ag & HIV-1/HIV-2 Ab Not Detected       UA  Recent Labs   Lab Test  02/16/17   1008  12/22/16   1517  10/21/16   0947  09/01/16   0940   08/09/16   1021  07/28/16   0901  07/24/16   0953   COLOR  Yellow  Yellow  Yellow  Yellow   < >  Yellow  Yellow  Yellow   APPEARANCE  Clear  Clear  Clear  Clear   < >  Clear  Clear  Slightly Cloudy   URINEGLC  Negative  Negative   Negative  Negative   < >  Negative  Negative  Negative   URINEBILI  Negative  Negative  Negative  Negative   < >  Negative  Negative  Negative   SG  1.010  1.010  1.010  1.010   < >  1.020  1.020  1.020   URINEPH  5.5  6.0  5.5  5.5   < >  5.5  5.0  5.5   PROTEIN  Negative  Negative  Negative  Negative   < >  Negative  Negative  Negative   UROBILINOGEN  0.2  0.2  0.2  0.2   < >  0.2  0.2  0.2   NITRITE  Negative  Negative  Negative  Negative   < >  Negative  Negative  Negative   UBLD  Negative  Negative  Negative  Negative   < >  Trace*  Negative  Small*   LEUKEST  Trace*  Negative  Negative  Trace*   < >  Negative  Negative  Large*   WBCU  O - 2   --    --   2-5*   --   10-25  Urine culture added due to reflex criteria  *  O - 2  >100  Clumps of WBC's seen  *   RBCU  O - 2   --    --   O - 2   --   O - 2  O - 2  2-5*   SQUAMOUSEPI   --    --    --   Few   --   Moderate*  Few   --    BACTERIA  Few*   --    --    --    --   Many*   --   Many*    < > = values in this interval not displayed.     Urine Microscopic  Recent Labs   Lab Test  02/16/17   1008  09/01/16   0940  08/09/16   1021  07/28/16   0901  07/24/16   0953   WBCU  O - 2  2-5*  10-25  Urine culture added due to reflex criteria  *  O - 2  >100  Clumps of WBC's seen  *   RBCU  O - 2  O - 2  O - 2  O - 2  2-5*   SQUAMOUSEPI   --   Few  Moderate*  Few   --    BACTERIA  Few*   --   Many*   --   Many*     Immunization History     Immunization History   Administered Date(s) Administered     Influenza (High Dose) 3 valent vaccine 09/07/2012, 10/08/2013, 09/26/2014, 10/09/2015, 09/21/2016     Influenza (IIV3) 08/29/2011     Pneumococcal (PCV 13) 04/05/2016     Pneumococcal 23 valent 05/01/2001, 10/04/2010     TD (ADULT, 7+) 07/01/2008          Chart documentation done in part with Dragon Voice recognition Software. Although reviewed after completion, some word and grammatical error may remain.    Mario Davenport MD

## 2017-05-16 NOTE — MR AVS SNAPSHOT
After Visit Summary   5/16/2017    Linda Spicer    MRN: [id number]           Patient Information     Date Of Birth          6/13/1931        Visit Information        Provider Department      5/16/2017 9:20 AM Mario Davenport MD [facility]        Today's Diagnoses     Rheumatoid arthritis involving multiple sites with positive rheumatoid factor (H)    -  1    High risk medication use        Pes anserine bursitis           Follow-ups after your visit        Additional Services     PHYSICAL THERAPY REFERRAL       Physical therapy at [facility]    Indication: bilateral pes anserine bursitis  Evaluate and treat                  Your next 10 appointments already scheduled     May 24, 2017 12:15 PM CDT   New Visit with Darrell Donaldson DPM   [facility]    [address] [facility] [id number] [phone]            May 26, 2017  9:30 AM CDT   Level O with [address])    [address] [address] [place] [id number] [phone]            May 30, 2017  9:00 AM CDT   LAB with BK LAB   [facility]    [address] [facility] [id number] [phone]           Patient must bring picture ID.  Patient should be prepared to give a urine specimen  Please do not eat 10-12 hours before your appointment if you are coming in fasting for labs on lipids, cholesterol, or glucose (sugar).  Pregnant women should follow their Care Team instructions. Water with medications is okay. Do not drink coffee or other fluids.   If you have concerns about taking  your medications, please ask at office or if scheduling via [email or web address], send a message by clicking on Secure Messaging, Message Your Care Team.            Jun 23, 2017  9:30 AM CDT   Level O with [address]  [facility])    [address] [place] [phone]   [phone]            Jun 27, 2017  9:00 AM CDT   [facility]    [address] [facility] [address]   [phone]           Patient must bring picture ID.  Patient should be prepared to give a urine specimen  Please do not eat 10-12 hours before your appointment if you are coming in fasting for labs on lipids, cholesterol, or glucose (sugar).  Pregnant women should follow their Care Team instructions. Water with medications is okay. Do not drink coffee or other fluids.   If you have concerns about taking  your medications, please ask at office or if scheduling via [email or web address], send a message by clicking on Secure Messaging, Message Your Care Team.            Jul 05, 2017  9:00 AM CDT   Nurse Only with DEVICE CHECK RN CARD   [facility]    [address] [place] [phone]   [phone]            Jul 25, 2017  9:30 AM CDT   LAB with BK LAB   [facility]    [address] [facility] [address]   [phone]           Patient must bring picture ID.  Patient should be prepared to give a urine specimen  Please do not eat 10-12 hours before your appointment if you are coming in fasting for labs on lipids, cholesterol, or glucose (sugar).  Pregnant women should follow their Care Team instructions. Water with medications is okay. Do not drink coffee or other fluids.   If you have concerns about taking  your medications, please ask at office or if scheduling via [email or web address], send a message by clicking on Secure Messaging, Message Your Care Team.            Aug 01, 2017 10:20 AM CDT   [facility] Visit with [facility]   [facility]    [address] [facility] [address]    [phone]            Sep 11, 2017  1:10 PM CDT   Return Visit with Emeterio Loza MD   [facility]    [address] [place] [address] [phone]              Future tests that were ordered for you today     Open Standing Orders        Priority Remaining Interval Expires Ordered    CBC with platelets differential Routine 2/2 Every 4 Weeks 11/12/2017 5/16/2017    Creatinine Routine 2/2 Every 4 Weeks 11/12/2017 5/16/2017    Hepatic panel Routine 2/2 Every 4 Weeks 11/12/2017 5/16/2017          Open Future Orders        Priority Expected Expires Ordered    CBC with platelets differential Routine 8/10/2017 8/29/2017 5/16/2017    Creatinine Routine 8/10/2017 8/29/2017 5/16/2017    CRP inflammation Routine 8/10/2017 8/29/2017 5/16/2017    Erythrocyte sedimentation rate auto Routine 8/10/2017 8/29/2017 5/16/2017    Hepatic panel Routine 8/10/2017 8/29/2017 5/16/2017    CBC with platelets differential Routine 5/29/2017 6/2/2017 5/16/2017    Creatinine Routine 5/29/2017 6/2/2017 5/16/2017    Hepatic panel Routine 5/29/2017 6/2/2017 5/16/2017    TPMT enzyme and phenotype Routine 5/29/2017 6/2/2017 5/16/2017            Who to contact     If you have questions or need follow up information about today's clinic visit or your schedule please contact [facility] directly at [phone].  Normal or non-critical lab and imaging results will be communicated to you by MyChart, letter or phone within 4 business days after the clinic has received the results. If you do not hear from us within 7 days, please contact the clinic through MyChart or phone. If you have a critical or abnormal lab result, we will notify you by phone as soon as possible.  Submit refill requests through [employer] or call your pharmacy and they will forward the refill request to us. Please allow 3 business days for your refill to be completed.          Additional  "Information About Your Visit        MyChart Information     [employer] gives you secure access to your electronic health record. If you see a primary care provider, you can also send messages to your care team and make appointments. If you have questions, please call your primary care clinic.  If you do not have a primary care provider, please call [phone] and they will assist you.        Care EveryWhere ID     This is your Care EveryWhere ID. This could be used by other organizations to access your [facility] medical records  [id number]        Your Vitals Were     Pulse Temperature Height Pulse Oximetry BMI (Body Mass Index)       66 97.8  F (36.6  C) (Oral) 1.6 m (5' 3\") 99% 28.24 kg/m2        Blood Pressure from Last 3 Encounters:   05/16/17 142/64   04/28/17 131/61   03/31/17 146/72    Weight from Last 3 Encounters:   05/16/17 72.3 kg (159 lb 6.4 oz)   04/28/17 71.2 kg (157 lb)   03/31/17 71.4 kg (157 lb 8 oz)              We Performed the Following     PHYSICAL THERAPY REFERRAL          Today's Medication Changes          These changes are accurate as of: 5/16/17 10:11 AM.  If you have any questions, ask your nurse or doctor.               Start taking these medicines.        Dose/Directions    azaTHIOprine 50 MG tablet   Commonly known as:  IMURAN   Used for:  Rheumatoid arthritis involving multiple sites with positive rheumatoid factor (H), High risk medication use   Started by:  Mario Davenport MD        Dose:  50 mg   Take 1 tablet (50 mg) by mouth daily   Quantity:  30 tablet   Refills:  3            Where to get your medicines      These medications were sent to [facility] - [place], [place] - [address]  [address], [facility] [address]     Phone:  [phone]     azaTHIOprine 50 MG tablet                Primary Care Provider Office Phone # Fax #    Tre Lockett [phone]       [facility] [address]  [facility] " [place] [address]        Thank you!     Thank you for choosing [facility]  for your care. Our goal is always to provide you with excellent care. Hearing back from our patients is one way we can continue to improve our services. Please take a few minutes to complete the written survey that you may receive in the mail after your visit with us. Thank you!             Your Updated Medication List - Protect others around you: Learn how to safely use, store and throw away your medicines at www.disposemymeds.org.          This list is accurate as of: 5/16/17 10:11 AM.  Always use your most recent med list.                   Brand Name Dispense Instructions for use    apixaban ANTICOAGULANT 2.5 MG tablet    ELIQUIS    60 tablet    Take 1 tablet (2.5 mg) by mouth 2 times daily       azaTHIOprine 50 MG tablet    IMURAN    30 tablet    Take 1 tablet (50 mg) by mouth daily       bismuth subsalicylate 262 MG Tabs     80 tablet    Take 1 tablet by mouth every 4 hours as needed       Blood Pressure Monitor Kit     1 kit    1 kit daily as needed       calcium-vitamin D 600-400 MG-UNIT per tablet    CALTRATE     Take 1 tablet by mouth 2 times daily       Carboxymethylcellulose Sodium 1 % Gel      1-2 drops (aka Genteal)       ciprofloxacin 250 MG tablet    CIPRO    90 tablet    Take 1 tablet (250 mg) by mouth daily       Cranberry 180 MG Caps      Take 1 capsule by mouth daily Unsure of strength       fish oil-omega-3 fatty acids 1000 MG capsule      Take 2 g by mouth daily. 2 capsules daily       FLAGYL PO      Take 500 mg by mouth 2 times daily       folic acid 1 MG tablet    FOLVITE    100 tablet    Take 1 tablet (1 mg) by mouth daily       GENTEAL MILD OP      Apply  to eye daily.       IBANdronate 150 MG tablet    BONIVA    3 tablet    Take 1 tablet (150 mg) by mouth every 30 days       levothyroxine 75 MCG tablet    SYNTHROID/LEVOTHROID    90 tablet    Take 1 tablet (75 mcg) by mouth daily       losartan 25 MG  tablet    COZAAR    180 tablet    Take 2 tablets (50 mg) by mouth daily (with dinner) Hold if SBP less than 110.       LUCENTIS IO      1 injection every 4 Weeks       metoprolol 25 MG 24 hr tablet    TOPROL-XL    45 tablet    Take 0.5 tablets (12.5 mg) by mouth daily       nitroglycerin 0.4 MG sublingual tablet    NITROSTAT    25 tablet    Place 1 tablet (0.4 mg) under the tongue every 5 minutes as needed for chest pain       omeprazole 20 MG CR capsule    priLOSEC    90 capsule    TAKE ONE CAPSULE BY MOUTH 30 MINUTES BEFORE BREAKFAST. DO NOT TAKE WITH LEVOTHYROXINE       * order for DME     1 Box    Equipment being ordered: Dispense face mask. Mrs. Spicer is immunosuppressed due to rheumatoid arthritis.       * order for DME     1 each    Equipment being ordered: Nebulizer. Use with Albuterol.       order for DME     1 each    Equipment being ordered: Dispense baffle, for use with nebulizer.       * order for DME     1 each    Equipment being ordered: Left wrist splint.       * order for DME     1 each    Equipment being ordered: Left wrist splint.       PRESERVISION AREDS PO      Take 2 tablets by mouth daily       REFRESH P.M. Oint      Apply  to eye. Daily at bedtime       saccharomyces boulardii 250 MG capsule    FLORASTOR     Take 250 mg by mouth       senna-docusate 8.6-50 MG per tablet    SENOKOT-S;PERICOLACE    120 tablet    Take 2 tablets by mouth At Bedtime Hold if diarrhea occurs.       SPIRONOLACTONE PO      Take 25 mg by mouth every morning Hold if SBP is less than 120.       triamcinolone 0.1 % cream    KENALOG    453.6 g    Apply sparingly to affected area on face three times daily.       ZYRTEC ALLERGY 10 MG tablet   Generic drug:  cetirizine      Take 10 mg by mouth At Bedtime       * Notice:  This list has 4 medication(s) that are the same as other medications prescribed for you. Read the directions carefully, and ask your doctor or other care provider to review them with you.

## 2017-05-16 NOTE — TELEPHONE ENCOUNTER
Prior Auth Needed Please  Drug: azathioprine 50mg  Insurance: [employer]  ID: [id number]  Phone Number: [phone]    Please do not close encounter until prior auth is approved or denied.    Thank you very kindly!  Niurka Olivares [facility]

## 2017-05-23 ENCOUNTER — CARE COORDINATION (OUTPATIENT)
Dept: CARE COORDINATION | Facility: CLINIC | Age: 82
End: 2017-05-23

## 2017-05-23 NOTE — TELEPHONE ENCOUNTER
Prior authorization has been approved for Azathioprine until 2039. Patient has been informed, will inform pharmacy also.  Nicole Will CMA  5/23/2017 2:53 PM

## 2017-05-23 NOTE — PROGRESS NOTES
Clinic Care Coordination Contact  Care Team Conversations    Patient called into RN CC to inquire why her pharmacy will not fill her azathioprine.  RN CC noted telephone encounter from 5/16/17 that a prior authorization was requested by Dr. Davenport and faxed on 5/18/17.  BRNADI CC spoke with Nicole Will MA with Dr. Davenport who states she will call the insurance company to determine the status of the prior authorization and then update patient.  Patient informed of the above information and verbalized understanding.    Melissa Behl BSN, RN, N  [facility] Care Coordinator  [phone]  [email or web address]

## 2017-05-24 ENCOUNTER — TRANSFERRED RECORDS (OUTPATIENT)
Dept: HEALTH INFORMATION MANAGEMENT | Facility: CLINIC | Age: 82
End: 2017-05-24

## 2017-05-24 ENCOUNTER — CARE COORDINATION (OUTPATIENT)
Dept: CARE COORDINATION | Facility: CLINIC | Age: 82
End: 2017-05-24

## 2017-05-24 ENCOUNTER — OFFICE VISIT (OUTPATIENT)
Dept: PODIATRY | Facility: CLINIC | Age: 82
End: 2017-05-24
Payer: MEDICARE

## 2017-05-24 VITALS — WEIGHT: 160 LBS | HEART RATE: 65 BPM | BODY MASS INDEX: 28.34 KG/M2 | OXYGEN SATURATION: 98 %

## 2017-05-24 DIAGNOSIS — M77.41 METATARSALGIA OF BOTH FEET: Primary | ICD-10-CM

## 2017-05-24 DIAGNOSIS — M77.42 METATARSALGIA OF BOTH FEET: Primary | ICD-10-CM

## 2017-05-24 PROCEDURE — 99203 OFFICE O/P NEW LOW 30 MIN: CPT | Performed by: PODIATRIST

## 2017-05-24 NOTE — PROGRESS NOTES
Clinic Care Coordination Contact  Care Team Conversations  D/I: This RN CC received a call from patient as her assigned CC is out of office. Patient asking to speak with Dr. Davenport's (Rheum) nurse Nicole as she has questions about when she should follow up as there has been a delay in her getting prescribed medication. Advised I would contact Nicole and she will call her back. This RN CC spoke to Nicole , explaining situation, patient's question.   P: Nicole will f/u with patient.       RN CC will f/u with patient as previously planned.    John IRAHETAN,RN- BC  Clinic Care Coordinator  [facility]  Phone: [phone]

## 2017-05-24 NOTE — PATIENT INSTRUCTIONS
We wish you continued good healing. If you have any questions or concerns, please do not hesitate to contact us at [phone].      Please remember to call and schedule a follow up appointment if one was recommended at your earliest convenience.   PODIATRY CLINIC HOURS  TELEPHONE NUMBER    Dr. Darrell Donaldson D.P.M [facility]    Clinics:  [facility]        Chloé Orozco MA  Medical Assistant  Tuesday 1PM-6PM  [facility]  Wednesday 7AM-2PM  [place]  Thursday 10AM-6PM  [place] Friday 7AM-345PM  [place]  Specialty schedulers:   [phone] to make an appointment with any Specialty Provider.        Urgent Care locations:    [facility] Monday-Friday 5 pm - 9 pm. Saturday-Sunday 9 am -5pm    Monday-Friday 11 am - 9 pm Saturday 9 am - 5 pm     Monday-Sunday 12 noon-8PM [phone]     If you need a medication refill, please contact us you may need lab work and/or a follow up visit prior to your refill (i.e. Antifungal medications).    Eng[employer] (secure e-mail communication and access to your chart) to send a message or to make an appointment.    If MRI needed please call Darnell Sanchez at [phone]        Weight management plan: Patient was referred to their PCP to discuss a diet and exercise plan.

## 2017-05-24 NOTE — MR AVS SNAPSHOT
After Visit Summary   5/24/2017    Linda Spicer    MRN: [id number]           Patient Information     Date Of Birth          6/13/1931        Visit Information        Provider Department      5/24/2017 12:15 PM Darrell Donaldson, DPM [facility]        Today's Diagnoses     Metatarsalgia of both feet    -  1      Care Instructions    We wish you continued good healing. If you have any questions or concerns, please do not hesitate to contact us at [phone].      Please remember to call and schedule a follow up appointment if one was recommended at your earliest convenience.   PODIATRY CLINIC HOURS  TELEPHONE NUMBER    Dr. Darrell LÓPEZPERNESTINE FAC FAS    Clinics:  [facility]        Chloé Orozco MA  Medical Assistant  Tuesday 1PM-6PM  [place]  Wednesday 7AM-2PM  [place]  Thursday 10AM-6PM  [place] Friday 7AM-345PM  [place]  Specialty schedulers:   [phone] to make an appointment with any Specialty Provider.        Urgent Care locations:    [facility] Monday-Friday 5 pm - 9 pm. Saturday-Sunday 9 am -5pm    Monday-Friday 11 am - 9 pm Saturday 9 am - 5 pm     Monday-Sunday 12 noon-8PM [phone]     If you need a medication refill, please contact us you may need lab work and/or a follow up visit prior to your refill (i.e. Antifungal medications).    [employer] (secure e-mail communication and access to your chart) to send a message or to make an appointment.    If MRI needed please call [facility] at [phone]        Weight management plan: Patient was referred to their PCP to discuss a diet and exercise plan.            Follow-ups after your visit        Your next 10 appointments already scheduled     May 26, 2017  9:00 AM CDT   LAB with LAB ONC [facility] ([facility])     [address] [place] [id number]   [phone]           Patient must bring picture ID.  Patient should be prepared to give a urine specimen  Please do not eat 10-12 hours before your appointment if you are coming in fasting for labs on lipids, cholesterol, or glucose (sugar).  Pregnant women should follow their Care Team instructions. Water with medications is okay. Do not drink coffee or other fluids.   If you have concerns about taking  your medications, please ask at office or if scheduling via Vis[employer], send a message by clicking on Secure Messaging, Message Your Care Team.            May 26, 2017  9:30 AM CDT   Level O with BAY 9 INFUSION   [facility]    [address] [place] [id number] [phone]            Jun 23, 2017  9:30 AM CDT   Level O with BAY 9 INFUSION   [facility] ([facility])    [address] [place] [id number] [phone]            Jun 27, 2017  9:00 AM CDT   LAB with BK LAB   [facility]    [address] [place] [id number] [phone]           Patient must bring picture ID.  Patient should be prepared to give a urine specimen  Please do not eat 10-12 hours before your appointment if you are coming in fasting for labs on lipids, cholesterol, or glucose (sugar).  Pregnant women should follow their Care Team instructions. Water with medications is okay. Do not drink coffee or other fluids.   If you have concerns about taking  your medications, please ask at office or if scheduling via [employer], send a message by clicking on Secure Messaging, Message Your Care Team.            Jul 05, 2017  9:00 AM CDT   Nurse Only with DEVICE CHECK RN CARD   [facility]    [address] [place] [address]   [phone]            Jul 25, 2017   9:30 AM CDT   LAB with BK LAB   [facility]    [address] [facility] [id number] [phone]           Patient must bring picture ID.  Patient should be prepared to give a urine specimen  Please do not eat 10-12 hours before your appointment if you are coming in fasting for labs on lipids, cholesterol, or glucose (sugar).  Pregnant women should follow their Care Team instructions. Water with medications is okay. Do not drink coffee or other fluids.   If you have concerns about taking  your medications, please ask at office or if scheduling via Narus, send a message by clicking on Secure Messaging, Message Your Care Team.            Aug 01, 2017 10:20 AM CDT   [facility] Visit with [facility]port MD   [facility]    [address] [place] [id number] [phone]            Sep 11, 2017  1:10 PM CDT   Return Visit with Emeterio Loza MD   [facility]    [address] [place] [id number] [phone]              Who to contact     If you have questions or need follow up information about today's clinic visit or your schedule please contact [facility] directly at [phone].  Normal or non-critical lab and imaging results will be communicated to you by MyChart, letter or phone within 4 business days after the clinic has received the results. If you do not hear from us within 7 days, please contact the clinic through [employer] or phone. If you have a critical or abnormal lab result, we will notify you by phone as soon as possible.  Submit refill requests through Narus or call your pharmacy and they will forward the refill request to us. Please allow 3 business days for your refill to be completed.          Additional Information About Your Visit        [employer] Information     [employer]arus gives  you secure access to your electronic health record. If you see a primary care provider, you can also send messages to your care team and make appointments. If you have questions, please call your primary care clinic.  If you do not have a primary care provider, please call [phone] and they will assist you.        Care EveryWhere ID     This is your Care EveryWhere ID. This could be used by other organizations to access your [place] medical records  [id number]        Your Vitals Were     Pulse Pulse Oximetry BMI (Body Mass Index)             65 98% 28.34 kg/m2          Blood Pressure from Last 3 Encounters:   05/16/17 140/66   04/28/17 131/61   03/31/17 146/72    Weight from Last 3 Encounters:   05/24/17 72.6 kg (160 lb)   05/16/17 72.3 kg (159 lb 6.4 oz)   04/28/17 71.2 kg (157 lb)              Today, you had the following     No orders found for display       Primary Care Provider Office Phone # Fax #    Tre Lockett [phone]       [facility] [address]  [facility] [address]        Thank you!     Thank you for choosing [facility]  for your care. Our goal is always to provide you with excellent care. Hearing back from our patients is one way we can continue to improve our services. Please take a few minutes to complete the written survey that you may receive in the mail after your visit with us. Thank you!             Your Updated Medication List - Protect others around you: Learn how to safely use, store and throw away your medicines at www.disposemymeds.org.          This list is accurate as of: 5/24/17 12:57 PM.  Always use your most recent med list.                   Brand Name Dispense Instructions for use    apixaban ANTICOAGULANT 2.5 MG tablet    ELIQUIS    60 tablet    Take 1 tablet (2.5 mg) by mouth 2 times daily       azaTHIOprine 50 MG tablet    IMURAN    30 tablet    Take 1 tablet (50 mg) by mouth daily       bismuth  subsalicylate 262 MG Tabs     80 tablet    Take 1 tablet by mouth every 4 hours as needed       Blood Pressure Monitor Kit     1 kit    1 kit daily as needed       calcium-vitamin D 600-400 MG-UNIT per tablet    CALTRATE     Take 1 tablet by mouth 2 times daily       Carboxymethylcellulose Sodium 1 % Gel      1-2 drops (aka Genteal)       ciprofloxacin 250 MG tablet    CIPRO    90 tablet    Take 1 tablet (250 mg) by mouth daily       Cranberry 180 MG Caps      Take 1 capsule by mouth daily Unsure of strength       fish oil-omega-3 fatty acids 1000 MG capsule      Take 2 g by mouth daily. 2 capsules daily       FLAGYL PO      Take 500 mg by mouth 2 times daily       folic acid 1 MG tablet    FOLVITE    100 tablet    Take 1 tablet (1 mg) by mouth daily       GENTEAL MILD OP      Apply  to eye daily.       IBANdronate 150 MG tablet    BONIVA    3 tablet    Take 1 tablet (150 mg) by mouth every 30 days       levothyroxine 75 MCG tablet    SYNTHROID/LEVOTHROID    90 tablet    Take 1 tablet (75 mcg) by mouth daily       losartan 25 MG tablet    COZAAR    180 tablet    Take 2 tablets (50 mg) by mouth daily (with dinner) Hold if SBP less than 110.       LUCENTIS IO      1 injection every 4 Weeks       metoprolol 25 MG 24 hr tablet    TOPROL-XL    45 tablet    Take 0.5 tablets (12.5 mg) by mouth daily       nitroglycerin 0.4 MG sublingual tablet    NITROSTAT    25 tablet    Place 1 tablet (0.4 mg) under the tongue every 5 minutes as needed for chest pain       omeprazole 20 MG CR capsule    priLOSEC    90 capsule    TAKE ONE CAPSULE BY MOUTH 30 MINUTES BEFORE BREAKFAST. DO NOT TAKE WITH LEVOTHYROXINE       * order for DME     1 Box    Equipment being ordered: Dispense face mask. Mrs. Spicer is immunosuppressed due to rheumatoid arthritis.       * order for DME     1 each    Equipment being ordered: Nebulizer. Use with Albuterol.       order for DME     1 each    Equipment being ordered: Dispense baffle, for use with  nebulizer.       * order for DME     1 each    Equipment being ordered: Left wrist splint.       * order for DME     1 each    Equipment being ordered: Left wrist splint.       PRESERVISION AREDS PO      Take 2 tablets by mouth daily       REFRESH P.M. Oint      Apply  to eye. Daily at bedtime       saccharomyces boulardii 250 MG capsule    FLORASTOR     Take 250 mg by mouth       senna-docusate 8.6-50 MG per tablet    SENOKOT-S;PERICOLACE    120 tablet    Take 2 tablets by mouth At Bedtime Hold if diarrhea occurs.       SPIRONOLACTONE PO      Take 25 mg by mouth every morning Hold if SBP is less than 120.       triamcinolone 0.1 % cream    KENALOG    453.6 g    Apply sparingly to affected area on face three times daily.       ZYRTEC ALLERGY 10 MG tablet   Generic drug:  cetirizine      Take 10 mg by mouth At Bedtime       * Notice:  This list has 4 medication(s) that are the same as other medications prescribed for you. Read the directions carefully, and ask your doctor or other care provider to review them with you.

## 2017-05-24 NOTE — PROGRESS NOTES
S:  Patient complains of bilateral foot pain.  Points to ball of foot.  Describes as a burning pain.  aggravated by activity and relieved by rest.  Has had this for years and relieved by orthotics with metatarsal pad.  Would like new pair.  She has RA.    ROS:  Denies edema, erythema, ecchymosis, numbness, or drainage.  Denies weakness or toe deformity.       Allergies   Allergen Reactions     Cephalexin Hcl Diarrhea     Gabapentin Other (See Comments)     Dizzsiness     Naproxen GI Disturbance     Perfume      Lactase Other (See Comments)     Macrobid [Nitrofurantoin Anhydrous]      Possibly related to lung disease      Sulfa Drugs      Throat swelling     Xanax [Alprazolam] Other (See Comments)     Dizziness        Current Outpatient Prescriptions   Medication Sig Dispense Refill     folic acid (FOLVITE) 1 MG tablet Take 1 tablet (1 mg) by mouth daily 100 tablet 1     azaTHIOprine (IMURAN) 50 MG tablet Take 1 tablet (50 mg) by mouth daily 30 tablet 3     losartan (COZAAR) 25 MG tablet Take 2 tablets (50 mg) by mouth daily (with dinner) Hold if SBP less than 110. 180 tablet 3     bismuth subsalicylate 262 MG TABS Take 1 tablet by mouth every 4 hours as needed 80 tablet 1     MetroNIDAZOLE (FLAGYL PO) Take 500 mg by mouth 2 times daily        metoprolol (TOPROL-XL) 25 MG 24 hr tablet Take 0.5 tablets (12.5 mg) by mouth daily 45 tablet 6     ciprofloxacin (CIPRO) 250 MG tablet Take 1 tablet (250 mg) by mouth daily 90 tablet 3     SPIRONOLACTONE PO Take 25 mg by mouth every morning Hold if SBP is less than 120.       apixaban ANTICOAGULANT (ELIQUIS) 2.5 MG tablet Take 1 tablet (2.5 mg) by mouth 2 times daily 60 tablet 11     omeprazole (PRILOSEC) 20 MG CR capsule TAKE ONE CAPSULE BY MOUTH 30 MINUTES BEFORE BREAKFAST. DO NOT TAKE WITH LEVOTHYROXINE 90 capsule 3     senna-docusate (SENOKOT-S;PERICOLACE) 8.6-50 MG per tablet Take 2 tablets by mouth At Bedtime Hold if diarrhea occurs. 120 tablet 11     Cranberry 180 MG  CAPS Take 1 capsule by mouth daily Unsure of strength       Carboxymethylcellulose Sodium 1 % GEL 1-2 drops (aka Genteal)       saccharomyces boulardii (FLORASTOR) 250 MG capsule Take 250 mg by mouth       levothyroxine (SYNTHROID, LEVOTHROID) 75 MCG tablet Take 1 tablet (75 mcg) by mouth daily 90 tablet 2     order for DME Equipment being ordered: Left wrist splint. 1 each 0     order for DME Equipment being ordered: Left wrist splint. 1 each 0     IBANdronate (BONIVA) 150 MG tablet Take 1 tablet (150 mg) by mouth every 30 days 3 tablet 3     order for DME Equipment being ordered: Dispense baffle, for use with nebulizer. 1 each 0     order for DME Equipment being ordered: Nebulizer. Use with Albuterol. 1 each 0     order for DME Equipment being ordered: Dispense face mask.  Mrs. Spicer is immunosuppressed due to rheumatoid arthritis. 1 Box 11     triamcinolone (KENALOG) 0.1 % cream Apply sparingly to affected area on face three times daily. 453.6 g 5     Multiple Vitamins-Minerals (PRESERVISION AREDS PO) Take 2 tablets by mouth daily       Blood Pressure Monitor KIT 1 kit daily as needed 1 kit 0     nitroglycerin (NITROSTAT) 0.4 MG SL tablet Place 1 tablet (0.4 mg) under the tongue every 5 minutes as needed for chest pain 25 tablet 0     Ranibizumab (LUCENTIS IO) 1 injection every 4 Weeks       cetirizine (ZYRTEC ALLERGY) 10 MG tablet Take 10 mg by mouth At Bedtime       Hypromellose (GENTEAL MILD OP) Apply  to eye daily.       Artificial Tear Ointment (REFRESH P.M.) OINT Apply  to eye. Daily at bedtime          calcium-vitamin D (CALTRATE) 600-400 MG-UNIT per tablet Take 1 tablet by mouth 2 times daily        fish oil-omega-3 fatty acids (FISH OIL) 1000 MG capsule Take 2 g by mouth daily. 2 capsules daily              Patient Active Problem List   Diagnosis     ACP (advance care planning)     Hypertension goal BP (blood pressure) < 150/90     Hypothyroid     Diffuse idiopathic pulmonary fibrosis (H)     Macular  degeneration, left eye     Irritable bowel syndrome     Encounter for palliative care     Adjustment disorder with anxious mood     Mild anemia     DDD (degenerative disc disease), lumbar     CKD (chronic kidney disease) stage 3, GFR 30-59 ml/min     History of blood transfusion     Aftercare following surgery     S/P lumbar laminectomy     High risk medication use     Osteopenia     Atrophic vaginitis     Fecal incontinence     Female stress incontinence     Impingement syndrome of both shoulders     UIP (usual interstitial pneumonitis) (H)     High risk medications (not anticoagulants) long-term use     Heart failure with preserved ejection fraction (H)     Congestive heart failure with preserved LV function, NYHA class 3 (H)     Other specified hypothyroidism     Rheumatoid arthritis involving multiple sites with positive rheumatoid factor (H)     Health Care Home     Sick sinus syndrome (H)       Past Medical History:   Diagnosis Date     Adjustment disorder with anxious mood 5/18/2015     Advanced directives, counseling/discussion 8/30/2012    Patient states has Advance Directive and will bring in a copy to clinic. 8/30/2012   Tevin May  [facility] Medical Assistant \       Anemia 9/25/2015     Basal cell cancer      CHF (congestive heart failure) (H) 9/18/2014     CKD (chronic kidney disease) stage 3, GFR 30-59 ml/min 9/29/2015     DDD (degenerative disc disease), lumbar 9/25/2015     Diffuse idiopathic pulmonary fibrosis (H) 5/6/2013     Encounter for palliative care 5/18/2015     History of blood transfusion 9/29/2015     Hypertension goal BP (blood pressure) < 140/90 9/7/2012     Hypothyroid 9/7/2012     Irritable bowel syndrome 10/29/2013     Macular degeneration      Macular degeneration, 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     Nondisplaced spiral fracture of shaft of humerus      Osteoporosis 8/13/2013     Imo Update utility     RA (rheumatoid arthritis) (H) 5/7/2013     Rheumatic fever      Shingles      Spinal stenosis  of lumbar region with neurogenic claudication 9/14/2015       Past Surgical History:   Procedure Laterality Date     APPENDECTOMY       BIOPSY      hemorrhoidectomy     ENT SURGERY      tonsillectomy     GYN SURGERY      3 D & C's     HYSTERECTOMY, PAP NO LONGER INDICATED       LAMINECTOMY LUMBAR ONE LEVEL N/A 10/13/2015    Procedure: LAMINECTOMY LUMBAR ONE LEVEL;  Surgeon: Fransico Toussaint MD;  Location:  OR       Family History   Problem Relation Age of Onset     Hypertension Mother      Psychotic Disorder Father      DIABETES Son      DIABETES Daughter      Blood Disease Daughter        Social History   Substance Use Topics     Smoking status: Never Smoker     Smokeless tobacco: Never Used     Alcohol use Yes      Comment: rare wine          O:  Pulse 65  Wt 72.6 kg (160 lb)  SpO2 98%  BMI 28.34 kg/m2.  Patient good historian.  A&O X3.  Pulses DP, PT 1/4 b/l.  CRT < 3 seconds X 10 digits.  Mild ankle edema or varicosities noted.  Sensation to light touch intact b/l.  Reflexes 2/4 b/l.  Skin thin with no hair b/l.  Normal arch with weightbearing.  No forefoot or rear foot deformities noted.  MS 5/5 all compartments.  Normal ROM all fore foot and rearfoot joints.  No equinus.  Pain under bilateral plantar metatarsal heads.  Atrophic fat pad.  Negative Lachmans test.  Negative Mulders click.  No pain anywhere else.  No erythema, edema, ecchymosis, or subcutaneous masses noted.    FOOT, COMPLETE, THREE OR MORE VIEWS, BILATERAL   5/15/2013 11:42 AM      HISTORY: Rheumatoid arthritis.      COMPARISON: None.      FINDINGS: There is diffuse osteopenia. Resection of the head of the  right fifth metatarsal and probable bilateral medial first metatarsal  bunionectomies are noted. There is joint space loss of the  interphalangeal joints of the second and third toes bilaterally,  worse on the left. There could be fusion of these joints. No fracture  or malalignment is identified. Remaining joint spaces are  maintained.      IMPRESSION  IMPRESSION:  1. Diffuse osteopenia.  2. Postop changes status post resection of the distal right fifth  metatarsal and probable bunionectomies of the bilateral first  metatarsals.  3. Fusion of the proximal interphalangeal joints of the bilateral  second toes and possibly of the third toes.      A:  Metatarsalgia right and left feet    P:    Personally reviewed x-rays.  Discussed cause with patient.  Explained how her atrophic fat pad is contributing to this.  Her shoes are 5 years old and she will get new pair every year. Instructed to wear stiff soles shoes at all times.   RX for orthotics.  She will call if she would like a new pair in future.  RETURN TO CLINIC PRN.    Darrell Donaldson DPM, FACFAS

## 2017-05-24 NOTE — NURSING NOTE
"Chief Complaint   Patient presents with     Foot Pain     both feet due to needing new orthotics       Initial Pulse 65  Wt 72.6 kg (160 lb)  SpO2 98%  BMI 28.34 kg/m2 Estimated body mass index is 28.34 kg/(m^2) as calculated from the following:    Height as of 5/16/17: 1.6 m (5' 3\").    Weight as of this encounter: 72.6 kg (160 lb).  Medication Reconciliation: complete  "

## 2017-05-25 ENCOUNTER — CARE COORDINATION (OUTPATIENT)
Dept: CARE COORDINATION | Facility: CLINIC | Age: 82
End: 2017-05-25

## 2017-05-25 DIAGNOSIS — E03.9 HYPOTHYROIDISM, UNSPECIFIED TYPE: ICD-10-CM

## 2017-05-25 NOTE — TELEPHONE ENCOUNTER
levothyroxine (SYNTHROID, LEVOTHROID) 75 MCG tablet     Last Written Prescription Date: 08/01/16  Last Quantity: 90, # refills: 2  Last Office Visit with [facility], [facility] or [facility] prescribing provider: 03/24/17     Next 5 appointments (look out 90 days)     Jul 05, 2017  9:00 AM CDT   Nurse Only with DEVICE CHECK RN CARD   [facility]    [address] [place] [id number]   [phone]            Aug 01, 2017 10:20 AM CDT   Return Visit with Mario Davenport MD   [facility] ([facility])    [address] [facility] [id number] [phone]                   TSH   Date Value Ref Range Status   02/16/2017 0.93 0.40 - 4.00 mU/L Final         Joann Paul  [facility]

## 2017-05-25 NOTE — PROGRESS NOTES
Clinic Care Coordination Contact  Care Team Conversations    Patient called into writer, as she states she has not heard back from Dr. Davenport's office.  Patient was instructed at her office visit with Dr. Davenport on 5/16/17:  Start azathioprine 50mg daily  - Labs in 2 weeks: TPMT  - Labs in 2 weeks, 4 weeks, and 8 weeks: CBC, Cr, Hepatic Panel    Patient has a lab appointment tomorrow and inquires if she should keep it or wait until she's been on the medicine for 2 weeks.  RN CC spoke with Dr. Davenport's MA Nicole Will who states Dr. Davenport wants patient to be on the medication for 2 weeks prior to having her labs drawn.  RN CC relayed this information to patient who will call back to reschedule her lab appointment.    Melissa Behl BSN, RN, N  [facility] Care Coordinator  [phone]  [email or web address]

## 2017-05-26 ENCOUNTER — INFUSION THERAPY VISIT (OUTPATIENT)
Dept: INFUSION THERAPY | Facility: CLINIC | Age: 82
End: 2017-05-26
Payer: MEDICARE

## 2017-05-26 VITALS
TEMPERATURE: 97.5 F | WEIGHT: 158.4 LBS | OXYGEN SATURATION: 99 % | DIASTOLIC BLOOD PRESSURE: 61 MMHG | HEART RATE: 61 BPM | BODY MASS INDEX: 28.06 KG/M2 | RESPIRATION RATE: 18 BRPM | SYSTOLIC BLOOD PRESSURE: 125 MMHG

## 2017-05-26 DIAGNOSIS — Z79.899 HIGH RISK MEDICATION USE: ICD-10-CM

## 2017-05-26 DIAGNOSIS — M05.79 RHEUMATOID ARTHRITIS INVOLVING MULTIPLE SITES WITH POSITIVE RHEUMATOID FACTOR (H): Primary | ICD-10-CM

## 2017-05-26 DIAGNOSIS — M05.79 RHEUMATOID ARTHRITIS INVOLVING MULTIPLE SITES WITH POSITIVE RHEUMATOID FACTOR (H): ICD-10-CM

## 2017-05-26 PROCEDURE — 96365 THER/PROPH/DIAG IV INF INIT: CPT | Performed by: INTERNAL MEDICINE

## 2017-05-26 PROCEDURE — 99207 ZZC NO CHARGE LOS: CPT

## 2017-05-26 RX ADMIN — Medication 250 ML: at 10:31

## 2017-05-26 ASSESSMENT — PAIN SCALES - GENERAL: PAINLEVEL: MILD PAIN (2)

## 2017-05-26 NOTE — MR AVS SNAPSHOT
After Visit Summary   5/26/2017    Linda Spicer    MRN: [id number]           Patient Information     Date Of Birth          6/13/1931        Visit Information        Provider Department      5/26/2017 9:30 AM [address]        Today's Diagnoses     Rheumatoid arthritis involving multiple sites with positive rheumatoid factor (H)    -  1       Follow-ups after your visit        Your next 10 appointments already scheduled     Jun 12, 2017  9:30 AM CDT   [facility]   [facility]    [address] [facility] [address]   [phone]           Patient must bring picture ID.  Patient should be prepared to give a urine specimen  Please do not eat 10-12 hours before your appointment if you are coming in fasting for labs on lipids, cholesterol, or glucose (sugar).  Pregnant women should follow their Care Team instructions. Water with medications is okay. Do not drink coffee or other fluids.   If you have concerns about taking  your medications, please ask at office or if scheduling via Vint, send a message by clicking on Secure Messaging, Message Your Care Team.            Jun 23, 2017  9:30 AM CDT   Level O with [address] ([facility])    [id number] [place] [id number] [phone]            Jun 27, 2017  9:00 AM CDT   [facility] BK LAB   [facility]    [address] [facility] [id number] [phone]           Patient must bring picture ID.  Patient should be prepared to give a urine specimen  Please do not eat 10-12 hours before your appointment if you are coming in fasting for labs on lipids, cholesterol, or glucose (sugar).  Pregnant women should follow their Care Team instructions. Water with medications is okay. Do not drink coffee or other fluids.   If  you have concerns about taking  your medications, please ask at office or if scheduling via Re2you, send a message by clicking on Secure Messaging, Message Your Care Team.            Jul 05, 2017  9:00 AM CDT   Nurse Only with DEVICE CHECK RN CARD   [facility]    [address] [facility] [address]   [phone]            Jul 21, 2017  9:30 AM CDT   Level O with BAY 5 INFUSION   [facility] ([facility])    [address] [facility] [address]   [phone]            Jul 25, 2017  9:30 AM CDT   LAB with BK LAB   [facility]    [address] [facility] [address]   [phone]           Patient must bring picture ID.  Patient should be prepared to give a urine specimen  Please do not eat 10-12 hours before your appointment if you are coming in fasting for labs on lipids, cholesterol, or glucose (sugar).  Pregnant women should follow their Care Team instructions. Water with medications is okay. Do not drink coffee or other fluids.   If you have concerns about taking  your medications, please ask at office or if scheduling via Re2you, send a message by clicking on Secure Messaging, Message Your Care Team.            Aug 01, 2017 10:20 AM CDT   [facility] with [facility]   [facility]    [address] [facility] [address]   [phone]            Sep 11, 2017  1:10 PM CDT   Return Visit with Emeterio Loza MD   [facility] ([facility])    [address] [facility] [address]   [phone]              Future tests that were ordered for you today     Open Future Orders        Priority Expected Expires Ordered    CBC with platelets and differential Routine 5/26/2017 5/26/2018 5/26/2017    Creatinine Routine 5/26/2017  5/26/2018 5/26/2017    Hepatic panel Routine 5/26/2017 5/26/2018 5/26/2017    Thiopurine Methyltransferase RBC Routine 5/26/2017 5/26/2018 5/26/2017            Who to contact     If you have questions or need follow up information about today's clinic visit or your schedule please contact [facility] directly at [phone].  Normal or non-critical lab and imaging results will be communicated to you by [employer], letter or phone within 4 business days after the clinic has received the results. If you do not hear from us within 7 days, please contact the clinic through [employer] or phone. If you have a critical or abnormal lab result, we will notify you by phone as soon as possible.  Submit refill requests through SnapNames or call your pharmacy and they will forward the refill request to us. Please allow 3 business days for your refill to be completed.          Additional Information About Your Visit        P[employer]Lob Information     SnapNames gives you secure access to your electronic health record. If you see a primary care provider, you can also send messages to your care team and make appointments. If you have questions, please call your primary care clinic.  If you do not have a primary care provider, please call [phone] and they will assist you.      SnapNames is an electronic gateway that provides easy, online access to your medical records. With SnapNames, you can request a clinic appointment, read your test results, renew a prescription or communicate with your care team.     To access your existing account, please contact your [facility] or call [phone] for assistance.        Care EveryWhere ID     This is your Care EveryWhere ID. This could be used by other organizations to access your [place] medical records  [id number]        Your Vitals Were     Pulse Temperature Respirations Pulse Oximetry BMI (Body Mass Index)       61 97.5  F (36.4  C) (Oral) 18 99%  28.06 kg/m2        Blood Pressure from Last 3 Encounters:   05/26/17 125/61   05/16/17 140/66   04/28/17 131/61    Weight from Last 3 Encounters:   05/26/17 71.8 kg (158 lb 6.4 oz)   05/24/17 72.6 kg (160 lb)   05/16/17 72.3 kg (159 lb 6.4 oz)              Today, you had the following     No orders found for display       Primary Care Provider Office Phone # Fax #    Tre Lockett [phone]       [facility] [address]  [facility] [address]        Thank you!     Thank you for choosing [facility]  for your care. Our goal is always to provide you with excellent care. Hearing back from our patients is one way we can continue to improve our services. Please take a few minutes to complete the written survey that you may receive in the mail after your visit with us. Thank you!             Your Updated Medication List - Protect others around you: Learn how to safely use, store and throw away your medicines at www.disposemymeds.org.          This list is accurate as of: 5/26/17  3:49 PM.  Always use your most recent med list.                   Brand Name Dispense Instructions for use    apixaban ANTICOAGULANT 2.5 MG tablet    ELIQUIS    60 tablet    Take 1 tablet (2.5 mg) by mouth 2 times daily       azaTHIOprine 50 MG tablet    IMURAN    30 tablet    Take 1 tablet (50 mg) by mouth daily       bismuth subsalicylate 262 MG Tabs     80 tablet    Take 1 tablet by mouth every 4 hours as needed       Blood Pressure Monitor Kit     1 kit    1 kit daily as needed       calcium-vitamin D 600-400 MG-UNIT per tablet    CALTRATE     Take 1 tablet by mouth 2 times daily       Carboxymethylcellulose Sodium 1 % Gel      1-2 drops (aka Genteal)       ciprofloxacin 250 MG tablet    CIPRO    90 tablet    Take 1 tablet (250 mg) by mouth daily       Cranberry 180 MG Caps      Take 1 capsule by mouth daily Unsure of strength       fish oil-omega-3 fatty acids 1000 MG  capsule      Take 2 g by mouth daily. 2 capsules daily       FLAGYL PO      Take 500 mg by mouth 2 times daily       folic acid 1 MG tablet    FOLVITE    100 tablet    Take 1 tablet (1 mg) by mouth daily       GENTEAL MILD OP      Apply  to eye daily.       IBANdronate 150 MG tablet    BONIVA    3 tablet    Take 1 tablet (150 mg) by mouth every 30 days       levothyroxine 75 MCG tablet    SYNTHROID/LEVOTHROID    90 tablet    Take 1 tablet (75 mcg) by mouth daily       losartan 25 MG tablet    COZAAR    180 tablet    Take 2 tablets (50 mg) by mouth daily (with dinner) Hold if SBP less than 110.       LUCENTIS IO      1 injection every 4 Weeks       metoprolol 25 MG 24 hr tablet    TOPROL-XL    45 tablet    Take 0.5 tablets (12.5 mg) by mouth daily       nitroglycerin 0.4 MG sublingual tablet    NITROSTAT    25 tablet    Place 1 tablet (0.4 mg) under the tongue every 5 minutes as needed for chest pain       omeprazole 20 MG CR capsule    priLOSEC    90 capsule    TAKE ONE CAPSULE BY MOUTH 30 MINUTES BEFORE BREAKFAST. DO NOT TAKE WITH LEVOTHYROXINE       * order for DME     1 Box    Equipment being ordered: Dispense face mask. Mrs. Spicer is immunosuppressed due to rheumatoid arthritis.       * order for DME     1 each    Equipment being ordered: Nebulizer. Use with Albuterol.       order for DME     1 each    Equipment being ordered: Dispense baffle, for use with nebulizer.       * order for DME     1 each    Equipment being ordered: Left wrist splint.       * order for DME     1 each    Equipment being ordered: Left wrist splint.       PRESERVISION AREDS PO      Take 2 tablets by mouth daily       REFRESH P.M. Oint      Apply  to eye. Daily at bedtime       saccharomyces boulardii 250 MG capsule    FLORASTOR     Take 250 mg by mouth       senna-docusate 8.6-50 MG per tablet    SENOKOT-S;PERICOLACE    120 tablet    Take 2 tablets by mouth At Bedtime Hold if diarrhea occurs.       SPIRONOLACTONE PO      Take 25 mg by  mouth every morning Hold if SBP is less than 120.       triamcinolone 0.1 % cream    KENALOG    453.6 g    Apply sparingly to affected area on face three times daily.       ZYRTEC ALLERGY 10 MG tablet   Generic drug:  cetirizine      Take 10 mg by mouth At Bedtime       * Notice:  This list has 4 medication(s) that are the same as other medications prescribed for you. Read the directions carefully, and ask your doctor or other care provider to review them with you.

## 2017-05-26 NOTE — PROGRESS NOTES
"Infusion Nursing Note:  Linad Spicer presents today for Orencia.    Patient seen by provider today: No   present during visit today: Not Applicable.    Note: N/A.    Intravenous Access:  Peripheral IV placed.    Treatment Conditions:  Rheumatology Infusion Checklist: PRIOR TO INFUSION OF BIOLOGICAL MEDICATIONS OR ANY OF THESE AS LISTED: Orencia (abatacept) \".rheumbiologicalchecklist\"    Prior to Infusion of biological medications or any of these as listed:    1. Elevated temperature, fever, chills, productive cough or abnormal vital signs, night sweats, coughing up blood or sputum, no appetite or abnormal vital signs : NO    2. Open wounds or new incisions: NO    3. Recent hospitalization: NO    4.  Recent surgeries:  NO    5. Any upcoming surgeries or dental procedures?:NO    6. Any current or recent bouts of illness or infection? On any antibiotics? : NO    7. Any new, sudden or worsening abdominal pain :NO    8. Vaccination within 4 weeks? Patient or someone in the household is scheduled to receive vaccination? No live virus vaccines prior to or during treatment :NO    9. Any nervous system diseases [i.e. multiple sclerosis, Guillain-[place], seizures, neurological  changes]: NO    10. Pregnant or breast feeding; or plans on pregnancy in the future: NO    11. Signs of worsening depression or suicidal ideations while taking benlysta:NO    12. New-onset medical symptoms: NO    13.  New cancer diagnosis or on chemotherapy or radiation NO    14.  Evaluate for any sign of active TB [Unexplained weight loss, Loss of appetite, Night sweats, Fever, Fatigue, Chills, Coughing for 3 weeks or longer, Hemoptysis (coughing up blood), Chest pain]: NO    **Note: If answered yes to any of the above, hold the infusion and contact ordering rheumatologist or on-call rheumatologist.   .      Post Infusion Assessment:  Patient tolerated infusion without incident.    Discharge Plan:   6/23 for Orencia.  Labs will be " arranged based on when she starts new oral medication.    BERENICE BONILLA RN

## 2017-05-30 RX ORDER — LEVOTHYROXINE SODIUM 75 UG/1
TABLET ORAL
Qty: 90 TABLET | Refills: 1 | Status: SHIPPED | OUTPATIENT
Start: 2017-05-30 | End: 2017-11-24

## 2017-05-30 NOTE — TELEPHONE ENCOUNTER
Prescription approved per [facility] Refill Protocol.  Dayanara Luna RN

## 2017-05-31 ENCOUNTER — CARE COORDINATION (OUTPATIENT)
Dept: CARE COORDINATION | Facility: CLINIC | Age: 82
End: 2017-05-31

## 2017-05-31 DIAGNOSIS — M81.0 OSTEOPOROSIS, POST-MENOPAUSAL: ICD-10-CM

## 2017-05-31 RX ORDER — IBANDRONATE SODIUM 150 MG/1
150 TABLET, FILM COATED ORAL
Qty: 3 TABLET | Refills: 3 | Status: CANCELLED
Start: 2017-05-31

## 2017-05-31 RX ORDER — IBANDRONATE SODIUM 150 MG/1
150 TABLET, FILM COATED ORAL
Qty: 3 TABLET | Refills: 3 | Status: SHIPPED | OUTPATIENT
Start: 2017-05-31 | End: 2017-05-31

## 2017-05-31 RX ORDER — IBANDRONATE SODIUM 150 MG/1
150 TABLET, FILM COATED ORAL
Qty: 3 TABLET | Refills: 3 | Status: SHIPPED | OUTPATIENT
Start: 2017-05-31 | End: 2017-06-26

## 2017-05-31 NOTE — PROGRESS NOTES
Clinic Care Coordination Contact  Care Team Conversations    Patient called RN PENNY requesting a refill of her Boniva.  She is inquiring if Dr. Lockett will refill this for her and if he would authorize a 3 month supply.    Please advise.    Melissa Behl BSN, RN, N  [facility] Care Coordinator  [phone]  [email or web address]

## 2017-06-01 ENCOUNTER — TELEPHONE (OUTPATIENT)
Dept: FAMILY MEDICINE | Facility: CLINIC | Age: 82
End: 2017-06-01

## 2017-06-01 DIAGNOSIS — M85.80 OSTEOPENIA: Primary | ICD-10-CM

## 2017-06-01 NOTE — TELEPHONE ENCOUNTER
Prior Auth Needed Please product not on formulary  Drug: boniva 150  Insurance: advance pcs  ID: [id number]  Phone Number: [phone]    Please do not close encounter until prior auth is approved or denied.    Thank you very kindly!  Niurka Olivares [facility] [place]

## 2017-06-02 NOTE — PROGRESS NOTES
Clinic Care Coordination Contact  Care Team Conversations    Patient informed Dr. Lockett refilled her Boniva and a prior authorization is required. Patient states she was due to take her Boniva yesterday and inquires how she can get back to taking it the first of the month.  RN CC advised patient to discuss Boniva administration/timing with pharmacist when she picks up her medication.  Patient agreeable to plan.    Melissa Behl BSN, RN, PHN  [facility] Care Coordinator  [phone]  [email or web address]

## 2017-06-02 NOTE — PROGRESS NOTES
Clinic Care Coordination Contact  [facility]/Voicemail    Referral Source: Self-patient/Caregiver  Clinical Data: Care Coordinator Outreach  Noted Dr. Lockett refilled patient's Boniva and per 6/1/17 telephone encounter, a prior authorization is required.  Outreach attempted x 1.  Left message on voicemail with call back information and requested return call.  Plan: Care Coordinator mailed out care coordination introduction letter on previous encounter. Care Coordinator will try to reach patient again in 3-5 business days.    Melissa Behl BSN, RN, PHN  [facility] Care Coordinator  [phone]  [email or web address]

## 2017-06-08 ENCOUNTER — TELEPHONE (OUTPATIENT)
Dept: FAMILY MEDICINE | Facility: CLINIC | Age: 82
End: 2017-06-08

## 2017-06-08 DIAGNOSIS — M85.80 OSTEOPENIA: Primary | ICD-10-CM

## 2017-06-08 DIAGNOSIS — M89.9 DISORDER OF BONE: ICD-10-CM

## 2017-06-08 RX ORDER — RISEDRONATE SODIUM 35 MG/1
35 TABLET, FILM COATED ORAL
Qty: 12 TABLET | Refills: 3 | Status: SHIPPED | OUTPATIENT
Start: 2017-06-08 | End: 2017-06-26

## 2017-06-08 NOTE — TELEPHONE ENCOUNTER
Rx for Actonel, substitute for Boniva, sent to [facility] pharmacy.

## 2017-06-08 NOTE — TELEPHONE ENCOUNTER
Prior Auth Needed Please (not on formulary)  Drug: actonel 35mg  Insurance: advance pcs  ID: [id number]  Phone Number: [phone]    Please do not close encounter until prior auth is approved or denied.    Thank you very kindly!  Niurka Olivares [facility] [place]

## 2017-06-12 DIAGNOSIS — M05.79 RHEUMATOID ARTHRITIS INVOLVING MULTIPLE SITES WITH POSITIVE RHEUMATOID FACTOR (H): ICD-10-CM

## 2017-06-12 DIAGNOSIS — Z79.899 HIGH RISK MEDICATION USE: ICD-10-CM

## 2017-06-12 LAB
ALBUMIN SERPL-MCNC: 3.5 G/DL (ref 3.4–5)
ALP SERPL-CCNC: 62 U/L (ref 40–150)
ALT SERPL W P-5'-P-CCNC: 34 U/L (ref 0–50)
AST SERPL W P-5'-P-CCNC: 28 U/L (ref 0–45)
BASOPHILS # BLD AUTO: 0 10E9/L (ref 0–0.2)
BASOPHILS NFR BLD AUTO: 0.3 %
BILIRUB DIRECT SERPL-MCNC: <0.1 MG/DL (ref 0–0.2)
BILIRUB SERPL-MCNC: 0.5 MG/DL (ref 0.2–1.3)
CREAT SERPL-MCNC: 1.21 MG/DL (ref 0.52–1.04)
DIFFERENTIAL METHOD BLD: ABNORMAL
EOSINOPHIL # BLD AUTO: 0.1 10E9/L (ref 0–0.7)
EOSINOPHIL NFR BLD AUTO: 2.3 %
ERYTHROCYTE [DISTWIDTH] IN BLOOD BY AUTOMATED COUNT: 13.3 % (ref 10–15)
GFR SERPL CREATININE-BSD FRML MDRD: 42 ML/MIN/1.7M2
HCT VFR BLD AUTO: 35.4 % (ref 35–47)
HGB BLD-MCNC: 11.6 G/DL (ref 11.7–15.7)
LYMPHOCYTES # BLD AUTO: 1.7 10E9/L (ref 0.8–5.3)
LYMPHOCYTES NFR BLD AUTO: 27.3 %
MCH RBC QN AUTO: 32.2 PG (ref 26.5–33)
MCHC RBC AUTO-ENTMCNC: 32.8 G/DL (ref 31.5–36.5)
MCV RBC AUTO: 98 FL (ref 78–100)
MONOCYTES # BLD AUTO: 0.5 10E9/L (ref 0–1.3)
MONOCYTES NFR BLD AUTO: 7.4 %
NEUTROPHILS # BLD AUTO: 3.8 10E9/L (ref 1.6–8.3)
NEUTROPHILS NFR BLD AUTO: 62.7 %
PLATELET # BLD AUTO: 243 10E9/L (ref 150–450)
PROT SERPL-MCNC: 7.3 G/DL (ref 6.8–8.8)
RBC # BLD AUTO: 3.6 10E12/L (ref 3.8–5.2)
WBC # BLD AUTO: 6.1 10E9/L (ref 4–11)

## 2017-06-12 PROCEDURE — 82657 ENZYME CELL ACTIVITY: CPT | Mod: 90 | Performed by: INTERNAL MEDICINE

## 2017-06-12 PROCEDURE — 85025 COMPLETE CBC W/AUTO DIFF WBC: CPT | Performed by: INTERNAL MEDICINE

## 2017-06-12 PROCEDURE — 36415 COLL VENOUS BLD VENIPUNCTURE: CPT | Performed by: INTERNAL MEDICINE

## 2017-06-12 PROCEDURE — 82565 ASSAY OF CREATININE: CPT | Performed by: INTERNAL MEDICINE

## 2017-06-12 PROCEDURE — 99000 SPECIMEN HANDLING OFFICE-LAB: CPT | Performed by: INTERNAL MEDICINE

## 2017-06-12 PROCEDURE — 80076 HEPATIC FUNCTION PANEL: CPT | Performed by: INTERNAL MEDICINE

## 2017-06-13 NOTE — TELEPHONE ENCOUNTER
"Pursue prior authorization for Boniva, based on telephone encounter last 6/1/17, as indicated blow.  Indication: Osteopenia  Rationale: Postmenopausal osteopenia associated with connective tissue disorder is at high risk of osteoporosis. Calcium with Vitamin D is insufficient. If left untreated, it will lead to osteoporosis and at high risk of fragility fractures and hospitalization.      \"Prior Auth Needed Please product not on formulary  Drug: boniva 150  Insurance: advance pcs  ID: [id number]  Phone Number: [phone]     Please do not close encounter until prior auth is approved or denied.     Thank you very kindly!  Niurka Olivares [facility] [place]\"  "

## 2017-06-13 NOTE — TELEPHONE ENCOUNTER
Has prior auth been started? Just checking.  Thank you very kindly!  Niurka Olivares [facility] [place]

## 2017-06-15 ENCOUNTER — OFFICE VISIT (OUTPATIENT)
Dept: FAMILY MEDICINE | Facility: CLINIC | Age: 82
End: 2017-06-15
Payer: MEDICARE

## 2017-06-15 VITALS
DIASTOLIC BLOOD PRESSURE: 64 MMHG | WEIGHT: 154 LBS | BODY MASS INDEX: 27.29 KG/M2 | HEART RATE: 64 BPM | HEIGHT: 63 IN | SYSTOLIC BLOOD PRESSURE: 146 MMHG | TEMPERATURE: 97.8 F | OXYGEN SATURATION: 98 %

## 2017-06-15 DIAGNOSIS — Z98.890 S/P LUMBAR LAMINECTOMY: ICD-10-CM

## 2017-06-15 DIAGNOSIS — R30.0 DYSURIA: Primary | ICD-10-CM

## 2017-06-15 DIAGNOSIS — J84.10 DIFFUSE IDIOPATHIC PULMONARY FIBROSIS (H): ICD-10-CM

## 2017-06-15 DIAGNOSIS — M05.79 RHEUMATOID ARTHRITIS INVOLVING MULTIPLE SITES WITH POSITIVE RHEUMATOID FACTOR (H): ICD-10-CM

## 2017-06-15 DIAGNOSIS — M62.830 SPASM OF BACK MUSCLES: ICD-10-CM

## 2017-06-15 DIAGNOSIS — N18.30 CKD (CHRONIC KIDNEY DISEASE) STAGE 3, GFR 30-59 ML/MIN (H): ICD-10-CM

## 2017-06-15 DIAGNOSIS — M51.369 DDD (DEGENERATIVE DISC DISEASE), LUMBAR: ICD-10-CM

## 2017-06-15 LAB
ALBUMIN UR-MCNC: NEGATIVE MG/DL
APPEARANCE UR: CLEAR
BILIRUB UR QL STRIP: NEGATIVE
COLOR UR AUTO: YELLOW
GLUCOSE UR STRIP-MCNC: NEGATIVE MG/DL
HGB UR QL STRIP: NEGATIVE
KETONES UR STRIP-MCNC: NEGATIVE MG/DL
LEUKOCYTE ESTERASE UR QL STRIP: NEGATIVE
NITRATE UR QL: NEGATIVE
PH UR STRIP: 5 PH (ref 5–7)
SP GR UR STRIP: <=1.005 (ref 1–1.03)
URN SPEC COLLECT METH UR: NORMAL
UROBILINOGEN UR STRIP-ACNC: 0.2 EU/DL (ref 0.2–1)

## 2017-06-15 PROCEDURE — 81003 URINALYSIS AUTO W/O SCOPE: CPT | Performed by: FAMILY MEDICINE

## 2017-06-15 PROCEDURE — 99214 OFFICE O/P EST MOD 30 MIN: CPT | Performed by: FAMILY MEDICINE

## 2017-06-15 ASSESSMENT — PAIN SCALES - GENERAL: PAINLEVEL: EXTREME PAIN (9)

## 2017-06-15 NOTE — TELEPHONE ENCOUNTER
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      Prior Authorization Starts (date): 6/15/2017    Insurance Plan: Medicare  Patient ID: [id number]  Phone: [phone]  On the phone  Route it to the team comfort   Postponed for 3 business days as protocol.  G[place]na Marquez

## 2017-06-15 NOTE — PATIENT INSTRUCTIONS
How to contact your care team: [phone] Pharmacy [phone]   ABY BARAKAT MD KATYA GEORGIEV, PA-C CHRIS JONES, PA-C NAM HO, MD JONATHAN BATES, MD ARVIN VOCAL, MD    Clinic hours M-Th 7am-7pm Fri 7am-5pm.   Urgent care M-F 11am-9pm  Sat/Sun 9am-5pm.   Pharmacy   Mon-Th:  8:00am-8pm   Fri:  8:00am-6:00pm  Sat/Sun  8:00am-5:00 pm       Back Spasm (No Trauma)    Spasm of the back muscles can occur after a sudden forceful twisting or bending force (such as in a car accident), after a simple awkward movement, or after lifting something heavy with poor body positioning. In any case, muscle spasm adds to the pain. Sleeping in an awkward position or on a poor quality mattress can also cause this. Some people respond to emotional stress by tensing the muscles of their back.  Pain that continues may need further evaluation or other types of treatment such as physical therapy.  You don't always need X-rays for the initial evaluation of back pain, unless you had a physical injury such as from a car accident or fall. If your pain continues and doesn't respond to medical treatment, X-rays and other tests may then be done.   Home care    As soon as possible, start sitting or walking again to avoid problems from prolonged bed rest (muscle weakness, worsening back stiffness and pain, blood clots in the legs).    When in bed, try to find a position of comfort. A firm mattress is best. Try lying flat on your back with pillows under your knees. You can also try lying on your side with your knees bent up toward your chest and a pillow between your knees.    Avoid prolonged sitting, long car rides, or travel. This puts more stress on the lower back than standing or walking.     During the first 24 to 72 hours after an injury or flare-up, apply an ice pack to the painful area for 20 minutes, then remove it for 20 minutes. Do this over a period of 60 to 90 minutes or several times a day. This will  reduce swelling and pain. Always wrap ice packs in a thin towel.    You can start with ice, then switch to heat. Heat (hot shower, hot bath, or heating pad) reduces pain, and works well for muscle spasms. Apply heat to the painful area for 20 minutes, then remove it for 20 minutes. Do this over a period of 60 to 90 minutes or several times a day. Do not sleep on a heating pad as it can burn or damage skin.    Alternate ice and heat therapies.    Be aware of safe lifting methods and do not lift anything over 15 pounds until all the pain is gone.  Gentle stretching will help your back heal faster. Do this simple routine 2 to 3 times a day until your back is feeling better.    Lie on your back with your knees bent and both feet on the ground    Slowly raise your left knee to your chest as you flatten your lower back against the floor. Hold for 20 to 30 seconds.    Relax and repeat the exercise with your right knee.    Do 2 to 3 of these exercises for each leg.    Repeat, hugging both knees to your chest at the same time.    Do not bounce, but use a gentle pull.  Medicines  Talk to your doctor before using medicine, especially if you have other medical problems or are taking other medicines.  You may use acetaminophen or ibuprofen to control pain, unless your healthcare provider prescribed another pain medicine. If you have a chronic condition such as diabetes, liver or kidney disease, stomach ulcer, or gastrointestinal bleeding, or are taking blood thinners, talk with your healthcare provider before taking any medicines.  Be careful if you are given prescription pain medicine, narcotics, or medicine for muscle spasm. They can cause drowsiness, affect your coordination, reflexes, or judgment. Do not drive or operate heavy machinery when taking these medicines. Take pain medicine only as prescribed by your healthcare provider.  Follow-up care  Follow up with your doctor, or as advised. Physical therapy or further tests  may be needed.  If X-rays were taken, they may be reviewed by a radiologist. You will be notified of any new findings that may affect your care.  Call 911  Seek emergency medical care if any of these occur:    Trouble breathing    Confusion    Drowsiness or trouble awakening    Fainting or loss of consciousness    Rapid or very slow heart rate    Loss of bowel or bladder control  When to seek medical advice  Call your healthcare provider right away if any of these occur:    Pain becomes worse or spreads to your legs    Weakness or numbness in one or both legs    Numbness in the groin or genital area    Unexplained fever over 100.4 F (38.0 C)    Burning or pain when passing urine  Date Last Reviewed: 6/1/2016 2000-2017 [employer]. [address] [place] [address], [place] [address]. All rights reserved. This information is not intended as a substitute for professional medical care. Always follow your healthcare professional's instructions.

## 2017-06-15 NOTE — PROGRESS NOTES
SUBJECTIVE:                                                    Linda Spicer is a 86 year old female who presents to clinic today for the following health issues:    URINARY TRACT SYMPTOMS- left flank pain or upper back pain     Onset: Yesterday morning 4am    Description:   Painful urination (Dysuria): no but burning  Blood in urine (Hematuria): no   Delay in urine (Hesitency): no     Intensity: severe    Progression of Symptoms:  worsening    Accompanying Signs & Symptoms:  Fever/chills: no   Flank pain no   Nausea and vomiting: no   Any vaginal symptoms: none  Abdominal/Pelvic Pain: YES- left abdominal and upper left back   History:   History of frequent UTI's: YES  History of kidney stones: no   Sexually Active: no   Possibility of pregnancy: No    Precipitating factors:   None         Therapies Tried and outcome: Increase fluid intake        Hypertension Follow-up      Outpatient blood pressures are not being checked.    Low Salt Diet: no added salt     Chronic Kidney Disease Follow-up      Current NSAID use?  No    Hypothyroidism Follow-up      Since last visit, patient describes the following symptoms: Weight stable, no hair loss, no skin changes, no constipation, no loose stools     Back Pain Follow Up       Description:   Location of pain:  left  Character of pain: sharp  Pain radiation: Does not radiate  Since last visit, pain is:  unchanged  New numbness or weakness in legs, not attributed to pain:  no     Intensity: Currently 3/10    History:   Pain interferes with job: Not applicable  Therapies tried without relief: rest  Therapies tried with relief: heat        Accompanying Signs & Symptoms:  Risk of Fracture:  None  Risk of Cauda Equina:  None  Risk of Infection:  None  Risk of Cancer:  None           Problem list and histories reviewed & adjusted, as indicated.  Additional history: as documented    Patient Active Problem List   Diagnosis     ACP (advance care planning)     Hypertension goal BP  (blood pressure) < 150/90     Hypothyroid     Diffuse idiopathic pulmonary fibrosis (H)     Macular degeneration, left eye     Irritable bowel syndrome     Encounter for palliative care     Adjustment disorder with anxious mood     Mild anemia     DDD (degenerative disc disease), lumbar     CKD (chronic kidney disease) stage 3, GFR 30-59 ml/min     History of blood transfusion     Aftercare following surgery     S/P lumbar laminectomy     High risk medication use     Osteopenia     Atrophic vaginitis     Fecal incontinence     Female stress incontinence     Impingement syndrome of both shoulders     UIP (usual interstitial pneumonitis) (H)     High risk medications (not anticoagulants) long-term use     Heart failure with preserved ejection fraction (H)     Congestive heart failure with preserved LV function, NYHA class 3 (H)     Other specified hypothyroidism     Rheumatoid arthritis involving multiple sites with positive rheumatoid factor (H)     Health Care Home     Sick sinus syndrome (H)     Past Surgical History:   Procedure Laterality Date     APPENDECTOMY       BIOPSY      hemorrhoidectomy     ENT SURGERY      tonsillectomy     GYN SURGERY      3 D & C's     HYSTERECTOMY, PAP NO LONGER INDICATED       LAMINECTOMY LUMBAR ONE LEVEL N/A 10/13/2015    Procedure: LAMINECTOMY LUMBAR ONE LEVEL;  Surgeon: Fransico Toussaint MD;  Location:  OR       Social History   Substance Use Topics     Smoking status: Never Smoker     Smokeless tobacco: Never Used     Alcohol use Yes      Comment: rare wine      Family History   Problem Relation Age of Onset     Hypertension Mother      Psychotic Disorder Father      DIABETES Son      DIABETES Daughter      Blood Disease Daughter          Current Outpatient Prescriptions   Medication Sig Dispense Refill     RISEdronate (ACTONEL) 35 MG tablet Take 1 tablet (35 mg) by mouth every 7 days Take 60 minutes before am meal with 8 oz. water. Remain upright for 30 minutes. 12 tablet  3     IBANdronate (BONIVA) 150 MG tablet Take 1 tablet (150 mg) by mouth every 30 days 3 tablet 3     levothyroxine (SYNTHROID/LEVOTHROID) 75 MCG tablet TAKE ONE TABLET BY MOUTH EVERY DAY 90 tablet 1     folic acid (FOLVITE) 1 MG tablet Take 1 tablet (1 mg) by mouth daily 100 tablet 1     azaTHIOprine (IMURAN) 50 MG tablet Take 1 tablet (50 mg) by mouth daily 30 tablet 3     losartan (COZAAR) 25 MG tablet Take 2 tablets (50 mg) by mouth daily (with dinner) Hold if SBP less than 110. 180 tablet 3     bismuth subsalicylate 262 MG TABS Take 1 tablet by mouth every 4 hours as needed 80 tablet 1     MetroNIDAZOLE (FLAGYL PO) Take 500 mg by mouth 2 times daily        metoprolol (TOPROL-XL) 25 MG 24 hr tablet Take 0.5 tablets (12.5 mg) by mouth daily 45 tablet 6     ciprofloxacin (CIPRO) 250 MG tablet Take 1 tablet (250 mg) by mouth daily 90 tablet 3     SPIRONOLACTONE PO Take 25 mg by mouth every morning Hold if SBP is less than 120.       apixaban ANTICOAGULANT (ELIQUIS) 2.5 MG tablet Take 1 tablet (2.5 mg) by mouth 2 times daily 60 tablet 11     omeprazole (PRILOSEC) 20 MG CR capsule TAKE ONE CAPSULE BY MOUTH 30 MINUTES BEFORE BREAKFAST. DO NOT TAKE WITH LEVOTHYROXINE 90 capsule 3     senna-docusate (SENOKOT-S;PERICOLACE) 8.6-50 MG per tablet Take 2 tablets by mouth At Bedtime Hold if diarrhea occurs. 120 tablet 11     Cranberry 180 MG CAPS Take 1 capsule by mouth daily Unsure of strength       Carboxymethylcellulose Sodium 1 % GEL 1-2 drops (aka Genteal)       saccharomyces boulardii (FLORASTOR) 250 MG capsule Take 250 mg by mouth       order for DME Equipment being ordered: Left wrist splint. 1 each 0     order for DME Equipment being ordered: Left wrist splint. 1 each 0     order for DME Equipment being ordered: Dispense baffle, for use with nebulizer. 1 each 0     order for DME Equipment being ordered: Nebulizer. Use with Albuterol. 1 each 0     order for DME Equipment being ordered: Dispense face mask.  Mrs. Spicer  is immunosuppressed due to rheumatoid arthritis. 1 Box 11     triamcinolone (KENALOG) 0.1 % cream Apply sparingly to affected area on face three times daily. 453.6 g 5     Multiple Vitamins-Minerals (PRESERVISION AREDS PO) Take 2 tablets by mouth daily       Blood Pressure Monitor KIT 1 kit daily as needed 1 kit 0     nitroglycerin (NITROSTAT) 0.4 MG SL tablet Place 1 tablet (0.4 mg) under the tongue every 5 minutes as needed for chest pain 25 tablet 0     Ranibizumab (LUCENTIS IO) 1 injection every 4 Weeks       cetirizine (ZYRTEC ALLERGY) 10 MG tablet Take 10 mg by mouth At Bedtime       Hypromellose (GENTEAL MILD OP) Apply  to eye daily.       Artificial Tear Ointment (REFRESH P.M.) OINT Apply  to eye. Daily at bedtime          calcium-vitamin D (CALTRATE) 600-400 MG-UNIT per tablet Take 1 tablet by mouth 2 times daily        fish oil-omega-3 fatty acids (FISH OIL) 1000 MG capsule Take 2 g by mouth daily. 2 capsules daily            Allergies   Allergen Reactions     Cephalexin Hcl Diarrhea     Gabapentin Other (See Comments)     Dizzsiness     Naproxen GI Disturbance     Perfume      Lactase Other (See Comments)     Macrobid [Nitrofurantoin Anhydrous]      Possibly related to lung disease      Sulfa Drugs      Throat swelling     Xanax [Alprazolam] Other (See Comments)     Dizziness      Recent Labs   Lab Test  06/12/17   0931  05/11/17   0931  03/24/17   1401  02/16/17   0949  12/22/16   1506   06/03/16   0756   05/22/14   0821   LDL   --    --    --    --    --    --   109*   --   97   HDL   --    --    --    --    --    --   47*   --   42*   TRIG   --    --    --    --    --    --   75   --   84   ALT  34  29  36  21  31   < >  36   < >   --    CR  1.21*  1.18*  1.31*  1.24*  1.22*   < >  1.44*   < >  1.21*   GFRESTIMATED  42*  43*  39*  41*  42*   < >  35*   < >  43*   GFRESTBLACK  51*  53*  47*  50*  51*   < >  42*   < >  52*   POTASSIUM   --    --   3.7  4.7  4.8   < >  5.1   < >  4.1   TSH   --    --     "--   0.93  0.83   < >   --    < >  0.58    < > = values in this interval not displayed.      BP Readings from Last 3 Encounters:   06/15/17 156/64   05/26/17 125/61   05/16/17 140/66    Wt Readings from Last 3 Encounters:   06/15/17 154 lb (69.9 kg)   05/26/17 158 lb 6.4 oz (71.8 kg)   05/24/17 160 lb (72.6 kg)                  Labs reviewed in EPIC    Reviewed and updated as needed this visit by clinical staff       Reviewed and updated as needed this visit by Provider         ROS:  Constitutional, HEENT, cardiovascular, pulmonary, gi and gu systems are negative, except as otherwise noted.    OBJECTIVE:                                                    /64 (BP Location: Right arm, Patient Position: Chair, Cuff Size: Adult Large)  Pulse 64  Temp 97.8  F (36.6  C) (Oral)  Ht 5' 3\" (1.6 m)  Wt 154 lb (69.9 kg)  SpO2 98%  Breastfeeding? No  BMI 27.28 kg/m2  Body mass index is 27.28 kg/(m^2).  GENERAL: elderly, alert, well nourished, well hydrated, no distress, slow gait but does not need aid.   HENT: ear canals- normal; TMs- hearing aids not removed; Nose- normal; Mouth- no ulcers, no lesions, missing dentition  NECK: no tenderness, no adenopathy, no asymmetry, no masses, no stiffness; thyroid- normal to palpation  RESP: lungs bilateral crackles consistent with pulmonary fibrosis  CV: regular rates and rhythm, normal S1 S2, no S3 or S4 and no murmur, no click or rub, normal pulses  ABDOMEN: soft, no tenderness, no  hepatosplenomegaly, no masses, normal bowel sounds  MS: extremities- no gross deformities noted, no edema  SKIN: no suspicious lesions, no rashes, age related skin changes with seborrheic keratosis and no actinic keratosis.    NEURO: strength and tone- decreased, sensory exam- grossly normal, reflexes- symmetric  BACK: no CVA tenderness, no paralumbar tenderness but left upper back paraspinal muscle spasm with tenderness. Scoliosis of mid thoracic spine. Scar in low back with no spinal " tenderness. Decreased ROM and straight leg raise not done.   MENTAL STATUS EXAM:  Appearance/Behavior: no apparent distress, neatly groomed, dressed appropriately for weather, appears stated age and is frail-appearing  Speech: normal  Mood/Affect: normal affect  Insight: Good     Diagnostic Test Results:  Results for orders placed or performed in visit on 06/15/17 (from the past 24 hour(s))   *UA reflex to Microscopic and Culture ([facility] (except [place] and [facility])   Result Value Ref [facility]    Color Urine Yellow     Appearance Urine Clear     Glucose Urine Negative NEG mg/dL    Bilirubin Urine Negative NEG    Ketones Urine Negative NEG mg/dL    Specific Gravity Urine <=1.005 1.003 - 1.035    Blood Urine Negative NEG    pH Urine 5.0 5.0 - 7.0 pH    Protein Albumin Urine Negative NEG mg/dL    Urobilinogen Urine 0.2 0.2 - 1.0 EU/dL    Nitrite Urine Negative NEG    Leukocyte Esterase Urine Negative NEG    Source Midstream Urine         ASSESSMENT/PLAN:                                                              ICD-10-CM    1. Dysuria R30.0 *UA reflex to Microscopic and Culture ([facility] (except [place] and [facility])     **UA reflex to Microscopic FUTURE anytime   2. Spasm of back muscles M62.830 Left upper back with previous surgery and scoliosis. May have twisted her back or slept on it wrong. No deformity noted. Careful use of heating pad OK but should be on a timer.    3. Diffuse idiopathic pulmonary fibrosis (H) J84.10 Chronic and managed by pulmonary. No recent increase in dyspnea.    4. DDD (degenerative disc disease), lumbar M51.36 stable   5. CKD (chronic kidney disease) stage 3, GFR 30-59 ml/min N18.3 Worried about kidneys. Will put in future lab order for urinalysis in case symptoms recur or do not improve   6. Rheumatoid arthritis involving multiple sites with positive rheumatoid factor (H) M05.79 rheumatology   7. S/P lumbar laminectomy Z98.890 At risk for  problems above level of surgery.        FUTURE LABS:       - Schedule non-fasting labs in 3 months  FUTURE APPOINTMENTS:       - Follow-up visit in 1-2 months or sooner if any questions or concerns. Follow up with primary care provider   Regular exercise- walking and work with physical therapy at assisted living  See Patient Instructions    Lily Quintero MD  [facility]

## 2017-06-15 NOTE — MR AVS SNAPSHOT
After Visit Summary   6/15/2017    Linda Spicer    MRN: [id number]           Patient Information     Date Of Birth          6/13/1931        Visit Information        Provider Department      6/15/2017 11:20 AM Lily Quintero MD [facility]        Today's Diagnoses     Dysuria    -  1    Spasm of back muscles          Care Instructions    How to contact your care team: [phone] Pharmacy [phone]   ABY BARAKAT MD KATYA GEORGIEV, PA-C CHRIS JONES, PA-C NAM HO, MD JONATHAN BATES, MD ARVIN VOCAL, MD    Clinic hours M-Th 7am-7pm Fri 7am-5pm.   Urgent care M-F 11am-9pm  Sat/Sun 9am-5pm.   Pharmacy   Mon-Th:  8:00am-8pm   Fri:  8:00am-6:00pm  Sat/Sun  8:00am-5:00 pm       Back Spasm (No Trauma)    Spasm of the back muscles can occur after a sudden forceful twisting or bending force (such as in a car accident), after a simple awkward movement, or after lifting something heavy with poor body positioning. In any case, muscle spasm adds to the pain. Sleeping in an awkward position or on a poor quality mattress can also cause this. Some people respond to emotional stress by tensing the muscles of their back.  Pain that continues may need further evaluation or other types of treatment such as physical therapy.  You don't always need X-rays for the initial evaluation of back pain, unless you had a physical injury such as from a car accident or fall. If your pain continues and doesn't respond to medical treatment, X-rays and other tests may then be done.   Home care    As soon as possible, start sitting or walking again to avoid problems from prolonged bed rest (muscle weakness, worsening back stiffness and pain, blood clots in the legs).    When in bed, try to find a position of comfort. A firm mattress is best. Try lying flat on your back with pillows under your knees. You can also try lying on your side with your knees bent up toward your chest  and a pillow between your knees.    Avoid prolonged sitting, long car rides, or travel. This puts more stress on the lower back than standing or walking.     During the first 24 to 72 hours after an injury or flare-up, apply an ice pack to the painful area for 20 minutes, then remove it for 20 minutes. Do this over a period of 60 to 90 minutes or several times a day. This will reduce swelling and pain. Always wrap ice packs in a thin towel.    You can start with ice, then switch to heat. Heat (hot shower, hot bath, or heating pad) reduces pain, and works well for muscle spasms. Apply heat to the painful area for 20 minutes, then remove it for 20 minutes. Do this over a period of 60 to 90 minutes or several times a day. Do not sleep on a heating pad as it can burn or damage skin.    Alternate ice and heat therapies.    Be aware of safe lifting methods and do not lift anything over 15 pounds until all the pain is gone.  Gentle stretching will help your back heal faster. Do this simple routine 2 to 3 times a day until your back is feeling better.    Lie on your back with your knees bent and both feet on the ground    Slowly raise your left knee to your chest as you flatten your lower back against the floor. Hold for 20 to 30 seconds.    Relax and repeat the exercise with your right knee.    Do 2 to 3 of these exercises for each leg.    Repeat, hugging both knees to your chest at the same time.    Do not bounce, but use a gentle pull.  Medicines  Talk to your doctor before using medicine, especially if you have other medical problems or are taking other medicines.  You may use acetaminophen or ibuprofen to control pain, unless your healthcare provider prescribed another pain medicine. If you have a chronic condition such as diabetes, liver or kidney disease, stomach ulcer, or gastrointestinal bleeding, or are taking blood thinners, talk with your healthcare provider before taking any medicines.  Be careful if you are  given prescription pain medicine, narcotics, or medicine for muscle spasm. They can cause drowsiness, affect your coordination, reflexes, or judgment. Do not drive or operate heavy machinery when taking these medicines. Take pain medicine only as prescribed by your healthcare provider.  Follow-up care  Follow up with your doctor, or as advised. Physical therapy or further tests may be needed.  If X-rays were taken, they may be reviewed by a radiologist. You will be notified of any new findings that may affect your care.  Call 911  Seek emergency medical care if any of these occur:    Trouble breathing    Confusion    Drowsiness or trouble awakening    Fainting or loss of consciousness    Rapid or very slow heart rate    Loss of bowel or bladder control  When to seek medical advice  Call your healthcare provider right away if any of these occur:    Pain becomes worse or spreads to your legs    Weakness or numbness in one or both legs    Numbness in the groin or genital area    Unexplained fever over 100.4 F (38.0 C)    Burning or pain when passing urine  Date Last Reviewed: 6/1/2016 2000-2017 [employer]. [address] [place] [address]. All rights reserved. This information is not intended as a substitute for professional medical care. Always follow your healthcare professional's instructions.                Follow-ups after your visit        Your next 10 appointments already scheduled     Jun 23, 2017  9:30 AM CDT   Level O with BAY [address] [place] [address] ([facility])    [address] [address] [place] [id number] [phone]            Jun 27, 2017  9:00 AM CDT   LAB with BK LAB   [facility] ([facility])    [address] [facility] [id number] [phone]           Patient must bring picture ID.  Patient should be prepared to give a urine specimen  Please do not eat 10-12 hours before  your appointment if you are coming in fasting for labs on lipids, cholesterol, or glucose (sugar).  Pregnant women should follow their Care Team instructions. Water with medications is okay. Do not drink coffee or other fluids.   If you have concerns about taking  your medications, please ask at office or if scheduling via [employer], send a message by clicking on Secure Messaging, Message Your Care Team.            Jul 05, 2017  9:00 AM CDT   Nurse Only with DEVICE CHECK RN CARD   [facility] ([facility])    [address] [place] [address]   [phone]            Jul 21, 2017  9:30 AM CDT   Level O with BAY 5 INFUSION   [facility] ([facility])    [address] [place] [address]   [phone]            Jul 25, 2017  9:30 AM CDT   LAB with BK LAB   [facility] ([facility])    [address] [facility] [id number] [phone]           Patient must bring picture ID.  Patient should be prepared to give a urine specimen  Please do not eat 10-12 hours before your appointment if you are coming in fasting for labs on lipids, cholesterol, or glucose (sugar).  Pregnant women should follow their Care Team instructions. Water with medications is okay. Do not drink coffee or other fluids.   If you have concerns about taking  your medications, please ask at office or if scheduling via [employer], send a message by clicking on Secure Messaging, Message Your Care Team.            Aug 01, 2017 10:20 AM CDT   Return Visit with Mario Davenport MD   [facility] ([facility])    [address] [facility] [id number] [phone]            Sep 11, 2017  1:10 PM CDT   Return Visit with Emeterio Loza MD   [facility] ([facility])    [address] [place] [address]  "  [phone]              Future tests that were ordered for you today     Open Future Orders        Priority Expected Expires Ordered    **UA reflex to Microscopic FUTURE anytime Routine 6/15/2017 6/15/2018 6/15/2017            Who to contact     If you have questions or need follow up information about today's clinic visit or your schedule please contact [facility] directly at [phone].  Normal or non-critical lab and imaging results will be communicated to you by [employer], letter or phone within 4 business days after the clinic has received the results. If you do not hear from us within 7 days, please contact the clinic through [employer] or phone. If you have a critical or abnormal lab result, we will notify you by phone as soon as possible.  Submit refill requests through [employer] or call your pharmacy and they will forward the refill request to us. Please allow 3 business days for your refill to be completed.          Additional Information About Your Visit        [facility] Information     [employer] gives you secure access to your electronic health record. If you see a primary care provider, you can also send messages to your care team and make appointments. If you have questions, please call your primary care clinic.  If you do not have a primary care provider, please call [phone] and they will assist you.        Care EveryWhere ID     This is your Care EveryWhere ID. This could be used by other organizations to access your [facility] medical records  [id number]        Your Vitals Were     Pulse Temperature Height Pulse Oximetry Breastfeeding? BMI (Body Mass Index)    64 97.8  F (36.6  C) (Oral) 5' 3\" (1.6 m) 98% No 27.28 kg/m2       Blood Pressure from Last 3 Encounters:   06/15/17 156/64   05/26/17 125/61   05/16/17 140/66    Weight from Last 3 Encounters:   06/15/17 154 lb (69.9 kg)   05/26/17 158 lb 6.4 oz (71.8 kg)   05/24/17 160 lb (72.6 kg)              We Performed the Following  "    *UA reflex to Microscopic and Culture ([place] [facility] (except [place] and [place])        Primary Care Provider Office Phone # Fax #    Tre Lockett [phone]       [facility] [address]  [facility] [address]        Thank you!     Thank you for choosing [facility]  for your care. Our goal is always to provide you with excellent care. Hearing back from our patients is one way we can continue to improve our services. Please take a few minutes to complete the written survey that you may receive in the mail after your visit with us. Thank you!             Your Updated Medication List - Protect others around you: Learn how to safely use, store and throw away your medicines at www.disposemymeds.org.          This list is accurate as of: 6/15/17 11:59 AM.  Always use your most recent med list.                   Brand Name Dispense Instructions for use    apixaban ANTICOAGULANT 2.5 MG tablet    ELIQUIS    60 tablet    Take 1 tablet (2.5 mg) by mouth 2 times daily       azaTHIOprine 50 MG tablet    IMURAN    30 tablet    Take 1 tablet (50 mg) by mouth daily       bismuth subsalicylate 262 MG Tabs     80 tablet    Take 1 tablet by mouth every 4 hours as needed       Blood Pressure Monitor Kit     1 kit    1 kit daily as needed       calcium-vitamin D 600-400 MG-UNIT per tablet    CALTRATE     Take 1 tablet by mouth 2 times daily       Carboxymethylcellulose Sodium 1 % Gel      1-2 drops (aka Genteal)       ciprofloxacin 250 MG tablet    CIPRO    90 tablet    Take 1 tablet (250 mg) by mouth daily       Cranberry 180 MG Caps      Take 1 capsule by mouth daily Unsure of strength       fish oil-omega-3 fatty acids 1000 MG capsule      Take 2 g by mouth daily. 2 capsules daily       FLAGYL PO      Take 500 mg by mouth 2 times daily       folic acid 1 MG tablet    FOLVITE    100 tablet    Take 1 tablet (1 mg) by mouth daily       GENTEAL  MILD OP      Apply  to eye daily.       IBANdronate 150 MG tablet    BONIVA    3 tablet    Take 1 tablet (150 mg) by mouth every 30 days       levothyroxine 75 MCG tablet    SYNTHROID/LEVOTHROID    90 tablet    TAKE ONE TABLET BY MOUTH EVERY DAY       losartan 25 MG tablet    COZAAR    180 tablet    Take 2 tablets (50 mg) by mouth daily (with dinner) Hold if SBP less than 110.       LUCENTIS IO      1 injection every 4 Weeks       metoprolol 25 MG 24 hr tablet    TOPROL-XL    45 tablet    Take 0.5 tablets (12.5 mg) by mouth daily       nitroglycerin 0.4 MG sublingual tablet    NITROSTAT    25 tablet    Place 1 tablet (0.4 mg) under the tongue every 5 minutes as needed for chest pain       omeprazole 20 MG CR capsule    priLOSEC    90 capsule    TAKE ONE CAPSULE BY MOUTH 30 MINUTES BEFORE BREAKFAST. DO NOT TAKE WITH LEVOTHYROXINE       * order for DME     1 Box    Equipment being ordered: Dispense face mask. Mrs. Spicer is immunosuppressed due to rheumatoid arthritis.       * order for DME     1 each    Equipment being ordered: Nebulizer. Use with Albuterol.       order for DME     1 each    Equipment being ordered: Dispense baffle, for use with nebulizer.       * order for DME     1 each    Equipment being ordered: Left wrist splint.       * order for DME     1 each    Equipment being ordered: Left wrist splint.       PRESERVISION AREDS PO      Take 2 tablets by mouth daily       REFRESH P.M. Oint      Apply  to eye. Daily at bedtime       RISEdronate 35 MG tablet    ACTONEL    12 tablet    Take 1 tablet (35 mg) by mouth every 7 days Take 60 minutes before am meal with 8 oz. water. Remain upright for 30 minutes.       saccharomyces boulardii 250 MG capsule    FLORASTOR     Take 250 mg by mouth       senna-docusate 8.6-50 MG per tablet    SENOKOT-S;PERICOLACE    120 tablet    Take 2 tablets by mouth At Bedtime Hold if diarrhea occurs.       SPIRONOLACTONE PO      Take 25 mg by mouth every morning Hold if SBP is less  than 120.       triamcinolone 0.1 % cream    KENALOG    453.6 g    Apply sparingly to affected area on face three times daily.       ZYRTEC ALLERGY 10 MG tablet   Generic drug:  cetirizine      Take 10 mg by mouth At Bedtime       * Notice:  This list has 4 medication(s) that are the same as other medications prescribed for you. Read the directions carefully, and ask your doctor or other care provider to review them with you.

## 2017-06-15 NOTE — NURSING NOTE
"Chief Complaint   Patient presents with     UTI       Initial /64 (BP Location: Right arm, Patient Position: Chair, Cuff Size: Adult Large)  Pulse 64  Temp 97.8  F (36.6  C) (Oral)  Ht 5' 3\" (1.6 m)  Wt 154 lb (69.9 kg)  SpO2 98%  Breastfeeding? No  BMI 27.28 kg/m2 Estimated body mass index is 27.28 kg/(m^2) as calculated from the following:    Height as of this encounter: 5' 3\" (1.6 m).    Weight as of this encounter: 154 lb (69.9 kg).  Medication Reconciliation: complete     Usama Ching CMA    "

## 2017-06-15 NOTE — TELEPHONE ENCOUNTER
Prior Authorization for @medication@ IBANdronate (BONIVA) 150 MG tabletwas approved on Aarti 15, 2017.  End date: 6/15/2018  PA reference #: [id number]  Pharmacy was notified by plan  Patient will be notified by the insurance plan. Letter sent by insurance company and letter forwarded to Abstracting team.  Glenna Marquez

## 2017-06-16 RX ORDER — IBANDRONATE SODIUM 150 MG/1
150 TABLET, FILM COATED ORAL
Qty: 1 TABLET | Refills: 11 | Status: SHIPPED | OUTPATIENT
Start: 2017-06-16 | End: 2017-11-11

## 2017-06-16 NOTE — TELEPHONE ENCOUNTER
I meant need new RX for Boniva.  Per Dr Lockett- Actonel (risedronate sodium) was sent as a substitute for the Boniva.  We deleted the Rx for Boniva.  Please send new Rx for Boniva or pursue prior auth for Actonel.  Let us know what you plan to do.  Thank you very kindly!  Niurka Oilvares, [facility] Regino Ramirez

## 2017-06-16 NOTE — TELEPHONE ENCOUNTER
Per Dr Lockett- Actonel (risedronate sodium) was sent as a substitute for the Boniva.  We deleted the Rx for Boniva.  Please send new Rx for Actonel or pursue prior auth for Actonel.  Let us know what you plan to do.  Thank you very kindly!  Niurka Olivares, [facility] [place]

## 2017-06-18 ENCOUNTER — MYC REFILL (OUTPATIENT)
Dept: FAMILY MEDICINE | Facility: CLINIC | Age: 82
End: 2017-06-18

## 2017-06-18 DIAGNOSIS — K21.00 REFLUX ESOPHAGITIS: ICD-10-CM

## 2017-06-18 LAB — TPMT BLD-CCNC: 23.2 U/ML

## 2017-06-19 NOTE — TELEPHONE ENCOUNTER
omeprazole (PRILOSEC) 20 MG CR capsule      Last Written Prescription Date: 01/18/17  Last Fill Quantity: 90,  # refills: 3   Last Office Visit with [facility], [facility] or [facility] prescribing provider: 06/15/17                                           Next 5 appointments (look out 90 days)     Jul 05, 2017  9:00 AM CDT   Nurse Only with DEVICE CHECK RN CARD   [facility] ([facility])    [address] [place] [id number]   [phone]            Aug 01, 2017 10:20 AM CDT   Return Visit with Mario Davenport MD   [facility] ([facility])    [address] [place] [phone]   [phone]            Sep 11, 2017  1:10 PM CDT   Return Visit with Emeterio Loza MD   [facility] ([facility])    [address] [place] [id number]   [phone]

## 2017-06-19 NOTE — TELEPHONE ENCOUNTER
Message from MyChart:  Original authorizing provider: MD Linda Michael would like a refill of the following medications:  omeprazole (PRILOSEC) 20 MG CR capsule [Tre Lockett MD]    [place] pharmacy: [facility] [place] [address]    Comment:

## 2017-06-20 ENCOUNTER — CARE COORDINATION (OUTPATIENT)
Dept: CARE COORDINATION | Facility: CLINIC | Age: 82
End: 2017-06-20

## 2017-06-20 ENCOUNTER — MEDICAL CORRESPONDENCE (OUTPATIENT)
Dept: HEALTH INFORMATION MANAGEMENT | Facility: CLINIC | Age: 82
End: 2017-06-20

## 2017-06-20 NOTE — PROGRESS NOTES
Clinic Care Coordination Contact  Care Team Conversations    Patient called into RN CC to inquire how to transfer her prescription for Boniva to the [facility].  RN CC informed patient she can contact [facility] and request for her prescription to be transferred to them from [facility].  Patient verbalized understanding.  No further questions or concerns at this time.    Melissa Behl BSN, RN, N  [facility] Care Coordinator  [phone]  [email or web address]

## 2017-06-21 NOTE — TELEPHONE ENCOUNTER
Prescription approved per [facility] Refill Protocol.  ANJELICA Jaramillo, Clinical RN Aura Bran.

## 2017-06-22 NOTE — PROGRESS NOTES
"TPMT with intermediate activity    [employer] message sent:  \"Ms. Spicer,    Labs show stable renal function.  Other labs are normal.    Sincerely,  Mario Davenport MD  6/22/2017 10:57 AM\""

## 2017-06-23 ENCOUNTER — CARE COORDINATION (OUTPATIENT)
Dept: CARE COORDINATION | Facility: CLINIC | Age: 82
End: 2017-06-23

## 2017-06-23 ENCOUNTER — INFUSION THERAPY VISIT (OUTPATIENT)
Dept: INFUSION THERAPY | Facility: CLINIC | Age: 82
End: 2017-06-23
Payer: MEDICARE

## 2017-06-23 VITALS
SYSTOLIC BLOOD PRESSURE: 136 MMHG | TEMPERATURE: 97.1 F | RESPIRATION RATE: 20 BRPM | WEIGHT: 157.8 LBS | DIASTOLIC BLOOD PRESSURE: 74 MMHG | HEART RATE: 69 BPM | BODY MASS INDEX: 27.95 KG/M2 | OXYGEN SATURATION: 98 %

## 2017-06-23 DIAGNOSIS — M05.79 RHEUMATOID ARTHRITIS INVOLVING MULTIPLE SITES WITH POSITIVE RHEUMATOID FACTOR (H): Primary | ICD-10-CM

## 2017-06-23 PROCEDURE — 99207 ZZC NO CHARGE LOS: CPT

## 2017-06-23 PROCEDURE — 96365 THER/PROPH/DIAG IV INF INIT: CPT | Performed by: INTERNAL MEDICINE

## 2017-06-23 RX ADMIN — Medication 250 ML: at 10:02

## 2017-06-23 NOTE — PROGRESS NOTES
Rheumatology team: please advise MsDriss Dannielle to continue Imuran (azathioprine)    Mario Davenport MD  6/23/2017 1:49 PM

## 2017-06-23 NOTE — PROGRESS NOTES
Clinic Care Coordination Contact  Care Team Conversations    RN CC received a call from patient inquiring if she is to continue taking Imuran, as she has completed the 30 day prescription.  RN CC noted this medication has refills and there is no note in the chart indicating that she should stop this medication after 30 days and informed patient of this.  Patient states she would still like a message sent to Dr. Davenport to advise if she should continue the Imuran.    Melissa Behl BSN, RN, N  [facility] Care Coordinator  [phone]  [email or web address]

## 2017-06-23 NOTE — MR AVS SNAPSHOT
After Visit Summary   6/23/2017    Linda Spicer    MRN: [id number]           Patient Information     Date Of Birth          6/13/1931        Visit Information        Provider Department      6/23/2017 9:30 AM [place] [facility]        Today's Diagnoses     Rheumatoid arthritis involving multiple sites with positive rheumatoid factor (H)    -  1       Follow-ups after your visit        Your next 10 appointments already scheduled     Jun 26, 2017  9:30 AM CDT   DX HIP/PELVIS/SPINE with MGDX1   [facility] ([facility])    [address] [place] [address]   [phone]           Please do not take any of the following 24 hours prior to the day of your exam: vitamins, calcium tablets, antacids.            Jun 27, 2017  9:00 AM CDT   LAB with BK LAB   [facility] ([facility])    [address] [facility] [id number] [phone]           Patient must bring picture ID.  Patient should be prepared to give a urine specimen  Please do not eat 10-12 hours before your appointment if you are coming in fasting for labs on lipids, cholesterol, or glucose (sugar).  Pregnant women should follow their Care Team instructions. Water with medications is okay. Do not drink coffee or other fluids.   If you have concerns about taking  your medications, please ask at office or if scheduling via [facility], send a message by clicking on Secure Messaging, Message Your Care Team.            Jul 05, 2017  9:00 AM CDT   Nurse Only with DEVICE CHECK RN CARD   [facility] ([facility])    [address] [place] [address]   [phone]            Jul 21, 2017  9:30 AM CDT   Level O with BAY 5 INFUSION   [facility] ([facility])    [address] [facility] [address]   [phone]            Jul 25, 2017  9:30  AM CDT   LAB with BK LAB   [facility] ([facility])    [address] [facility] [id number] [phone]           Patient must bring picture ID.  Patient should be prepared to give a urine specimen  Please do not eat 10-12 hours before your appointment if you are coming in fasting for labs on lipids, cholesterol, or glucose (sugar).  Pregnant women should follow their Care Team instructions. Water with medications is okay. Do not drink coffee or other fluids.   If you have concerns about taking  your medications, please ask at office or if scheduling via [employer], send a message by clicking on Secure Messaging, Message Your Care Team.            Aug 01, 2017 10:20 AM CDT   Return Visit with Mario Davenport MD   [facility] ([facility])    [address] [facility] [id number] [phone]            Aug 18, 2017  9:30 AM CDT   Level O with [place] [facility] ([facility])    [address] [place] [id number] [phone]            Sep 11, 2017  1:10 PM CDT   Return Visit with mEeterio Loza MD   [facility] ([facility])    [address] [place] [id number] [phone]              Who to contact     If you have questions or need follow up information about today's clinic visit or your schedule please contact [facility] directly at [phone].  Normal or non-critical lab and imaging results will be communicated to you by MyChart, letter or phone within 4 business days after the clinic has received the results. If you do not hear from us within 7 days, please contact the clinic through MyChart or phone. If you have a critical or abnormal lab result, we will notify you by phone as soon as possible.  Submit refill requests through [employer] or call your pharmacy and they will  forward the refill request to us. Please allow 3 business days for your refill to be completed.          Additional Information About Your Visit        [employer]     [employer] gives you secure access to your electronic health record. If you see a primary care provider, you can also send messages to your care team and make appointments. If you have questions, please call your primary care clinic.  If you do not have a primary care provider, please call [phone] and they will assist you.      [employer] is an electronic gateway that provides easy, online access to your medical records. With [employer], you can request a clinic appointment, read your test results, renew a prescription or communicate with your care team.     To access your existing account, please contact your [facility] or call [phone] for assistance.        Care EveryWhere ID     This is your Care EveryWhere ID. This could be used by other organizations to access your [place] medical records  [id number]        Your Vitals Were     Pulse Temperature Respirations Pulse Oximetry BMI (Body Mass Index)       69 97.1  F (36.2  C) (Oral) 20 98% 27.95 kg/m2        Blood Pressure from Last 3 Encounters:   06/23/17 136/74   06/15/17 146/64   05/26/17 125/61    Weight from Last 3 Encounters:   06/23/17 71.6 kg (157 lb 12.8 oz)   06/15/17 69.9 kg (154 lb)   05/26/17 71.8 kg (158 lb 6.4 oz)              Today, you had the following     No orders found for display       Primary Care Provider Office Phone # Fax #    Tre Lockett [phone]       [facility] [address]  [facility] [address]        Equal Access to Services     [facility] : Hadii aad mariely hadasho Soomaali, waaxda luqadaha, qaybta kaalmada adeegyada, zahraa skinner. So [facility] [phone].    ATENCIÓN: Si habla español, tiene a merchant disposición servicios gratuitos de asistencia  lingüística. Alin al [phone].    We comply with applicable federal civil rights laws and [place] laws. We do not discriminate on the basis of race, color, national origin, age, disability sex, sexual orientation or gender identity.            Thank you!     Thank you for choosing [facility]  for your care. Our goal is always to provide you with excellent care. Hearing back from our patients is one way we can continue to improve our services. Please take a few minutes to complete the written survey that you may receive in the mail after your visit with us. Thank you!             Your Updated Medication List - Protect others around you: Learn how to safely use, store and throw away your medicines at www.disposemymeds.org.          This list is accurate as of: 6/23/17  2:49 PM.  Always use your most recent med list.                   Brand Name Dispense Instructions for use Diagnosis    apixaban ANTICOAGULANT 2.5 MG tablet    ELIQUIS    60 tablet    Take 1 tablet (2.5 mg) by mouth 2 times daily    H/O atrial flutter       azaTHIOprine 50 MG tablet    IMURAN    30 tablet    Take 1 tablet (50 mg) by mouth daily    Rheumatoid arthritis involving multiple sites with positive rheumatoid factor (H), High risk medication use       bismuth subsalicylate 262 MG Tabs     80 tablet    Take 1 tablet by mouth every 4 hours as needed    Acute infectious diarrhea, Enteritis due to Norovirus       Blood Pressure Monitor Kit     1 kit    1 kit daily as needed    CHF (congestive heart failure) (H)       calcium-vitamin D 600-400 MG-UNIT per tablet    CALTRATE     Take 1 tablet by mouth 2 times daily        Carboxymethylcellulose Sodium 1 % Gel      1-2 drops (aka Genteal)        ciprofloxacin 250 MG tablet    CIPRO    90 tablet    Take 1 tablet (250 mg) by mouth daily    Chronic UTI       Cranberry 180 MG Caps      Take 1 capsule by mouth daily Unsure of strength        fish oil-omega-3 fatty acids 1000 MG  capsule      Take 2 g by mouth daily. 2 capsules daily        FLAGYL PO      Take 500 mg by mouth 2 times daily    Acute infectious diarrhea       folic acid 1 MG tablet    FOLVITE    100 tablet    Take 1 tablet (1 mg) by mouth daily    Oral aphthae       GENTEAL MILD OP      Apply  to eye daily.        * IBANdronate 150 MG tablet    BONIVA    3 tablet    Take 1 tablet (150 mg) by mouth every 30 days    Osteoporosis, post-menopausal       * IBANdronate 150 MG tablet    BONIVA    1 tablet    Take 1 tablet (150 mg) by mouth every 30 days Take 60 minutes before am meal with 8 oz. water. Remain upright for 30 minutes.    Osteopenia       levothyroxine 75 MCG tablet    SYNTHROID/LEVOTHROID    90 tablet    TAKE ONE TABLET BY MOUTH EVERY DAY    Hypothyroidism, unspecified type       losartan 25 MG tablet    COZAAR    180 tablet    Take 2 tablets (50 mg) by mouth daily (with dinner) Hold if SBP less than 110.    Hypertension goal BP (blood pressure) < 140/90       LUCENTIS IO      1 injection every 4 Weeks        metoprolol 25 MG 24 hr tablet    TOPROL-XL    45 tablet    Take 0.5 tablets (12.5 mg) by mouth daily    Paroxysmal atrial flutter (H)       nitroglycerin 0.4 MG sublingual tablet    NITROSTAT    25 tablet    Place 1 tablet (0.4 mg) under the tongue every 5 minutes as needed for chest pain    Chest pain       omeprazole 20 MG CR capsule    priLOSEC    90 capsule    Take 1 capsule (20 mg) by mouth daily    Reflux esophagitis       * order for DME     1 Box    Equipment being ordered: Dispense face mask. Mrs. Spicer is immunosuppressed due to rheumatoid arthritis.    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 Use with Albuterol.    Hypoxia       order for DME     1 each    Equipment being ordered: Dispense baffle, for use with nebulizer.    Pneumonia of left upper lobe due to Mycoplasma pneumoniae       * order  for DME     1 each    Equipment being ordered: Left wrist splint.    Injury of left hand, initial encounter       * order for DME     1 each    Equipment being ordered: Left wrist splint.    Left wrist injury, initial encounter       PRESERVISION AREDS PO      Take 2 tablets by mouth daily        REFRESH P.M. Oint      Apply  to eye. Daily at bedtime        RISEdronate 35 MG tablet    ACTONEL    12 tablet    Take 1 tablet (35 mg) by mouth every 7 days Take 60 minutes before am meal with 8 oz. water. Remain upright for 30 minutes.    Osteopenia       saccharomyces boulardii 250 MG capsule    FLORASTOR     Take 250 mg by mouth        senna-docusate 8.6-50 MG per tablet    SENOKOT-S;PERICOLACE    120 tablet    Take 2 tablets by mouth At Bedtime Hold if diarrhea occurs.    Constipation, chronic       SPIRONOLACTONE PO      Take 25 mg by mouth every morning Hold if SBP is less than 120.    Congestive heart failure with preserved LV function, NYHA class 3 (H)       triamcinolone 0.1 % cream    KENALOG    453.6 g    Apply sparingly to affected area on face three times daily.    Facial lesion       ZYRTEC ALLERGY 10 MG tablet   Generic drug:  cetirizine      Take 10 mg by mouth At Bedtime        * Notice:  This list has 6 medication(s) that are the same as other medications prescribed for you. Read the directions carefully, and ask your doctor or other care provider to review them with you.

## 2017-06-23 NOTE — PROGRESS NOTES
"Infusion Nursing Note:  Linda KATEY Orellana presents today for Orencia.    Patient seen by provider today: No   present during visit today: Not Applicable.    Note: N/A.    Intravenous Access:  Peripheral IV placed.    Treatment Conditions:  Rheumatology Infusion Checklist: PRIOR TO INFUSION OF BIOLOGICAL MEDICATIONS OR ANY OF THESE AS LISTED: Orencia (abatacept) \".rheumbiologicalchecklist\"    Prior to Infusion of biological medications or any of these as listed:    1. Elevated temperature, fever, chills, productive cough or abnormal vital signs, night sweats, coughing up blood or sputum, no appetite or abnormal vital signs : NO    2. Open wounds or new incisions: NO    3. Recent hospitalization: NO    4.  Recent surgeries:  NO    5. Any upcoming surgeries or dental procedures?:NO    6. Any current or recent bouts of illness or infection? On any antibiotics? : NO    7. Any new, sudden or worsening abdominal pain :NO    8. Vaccination within 4 weeks? Patient or someone in the household is scheduled to receive vaccination? No live virus vaccines prior to or during treatment :NO    9. Any nervous system diseases [i.e. multiple sclerosis, Guillain-[place], seizures, neurological  changes]: NO    10. Pregnant or breast feeding; or plans on pregnancy in the future: NO    11. Signs of worsening depression or suicidal ideations while taking benlysta:N/A    12. New-onset medical symptoms: NO    13.  New cancer diagnosis or on chemotherapy or radiation NO    14.  Evaluate for any sign of active TB [Unexplained weight loss, Loss of appetite, Night sweats, Fever, Fatigue, Chills, Coughing for 3 weeks or longer, Hemoptysis (coughing up blood), Chest pain]: NO    **Note: If answered yes to any of the above, hold the infusion and contact ordering rheumatologist or on-call rheumatologist.   .      Post Infusion Assessment:  Patient tolerated infusion without incident.  Site patent and intact, free from redness, edema or " discomfort.  No evidence of extravasations.  Access discontinued per protocol.  Rheumatology Post Infusion: POST-INFUSION OF BIOLOGICAL MEDICATION:    Reviewed with patient.  Given biologic medication or medication hand-out. Inform patient if any fever, chills or signs of infection, new symptoms, abdominal pain, heart palpitations, shortness of breath, reaction, weakness, neurological changes, seek medical attention immediately and should not receive infusions. No live virus vaccines prior to or during treatment or up to 6 months post infusion. If the patient has an upcoming procedure or surgery, this should be discussed with the rheumatologist and surgeon or provider..    Discharge Plan:   Patient and/or family verbalized understanding of discharge instructions and all questions answered.  Copy of AVS reviewed with patient and/or family.  Patient will return 7/21/2017 for next appointment.  Patient discharged in stable condition accompanied by: daughter.  Departure Mode: Ambulatory.    Lacey Granger RN

## 2017-06-26 ENCOUNTER — RADIANT APPOINTMENT (OUTPATIENT)
Dept: BONE DENSITY | Facility: CLINIC | Age: 82
End: 2017-06-26
Attending: INTERNAL MEDICINE
Payer: MEDICARE

## 2017-06-26 DIAGNOSIS — M89.9 DISORDER OF BONE: ICD-10-CM

## 2017-06-26 DIAGNOSIS — M85.80 OSTEOPENIA: ICD-10-CM

## 2017-06-26 PROCEDURE — 77080 DXA BONE DENSITY AXIAL: CPT | Performed by: RADIOLOGY

## 2017-06-26 NOTE — PROGRESS NOTES
Patient informed of below information and recommendations. Understood and agreed with plan. Had no further questions. Verified appointment information in August.     Ivelisse Fall RN

## 2017-06-27 DIAGNOSIS — M05.79 RHEUMATOID ARTHRITIS INVOLVING MULTIPLE SITES WITH POSITIVE RHEUMATOID FACTOR (H): ICD-10-CM

## 2017-06-27 DIAGNOSIS — Z79.899 HIGH RISK MEDICATION USE: ICD-10-CM

## 2017-06-27 LAB
ALBUMIN SERPL-MCNC: 3.4 G/DL (ref 3.4–5)
ALP SERPL-CCNC: 64 U/L (ref 40–150)
ALT SERPL W P-5'-P-CCNC: 26 U/L (ref 0–50)
AST SERPL W P-5'-P-CCNC: 19 U/L (ref 0–45)
BASOPHILS # BLD AUTO: 0 10E9/L (ref 0–0.2)
BASOPHILS NFR BLD AUTO: 0.4 %
BILIRUB DIRECT SERPL-MCNC: <0.1 MG/DL (ref 0–0.2)
BILIRUB SERPL-MCNC: 0.4 MG/DL (ref 0.2–1.3)
CREAT SERPL-MCNC: 1.19 MG/DL (ref 0.52–1.04)
DIFFERENTIAL METHOD BLD: ABNORMAL
EOSINOPHIL # BLD AUTO: 0.1 10E9/L (ref 0–0.7)
EOSINOPHIL NFR BLD AUTO: 1.9 %
ERYTHROCYTE [DISTWIDTH] IN BLOOD BY AUTOMATED COUNT: 13.6 % (ref 10–15)
GFR SERPL CREATININE-BSD FRML MDRD: 43 ML/MIN/1.7M2
HCT VFR BLD AUTO: 35.1 % (ref 35–47)
HGB BLD-MCNC: 11.5 G/DL (ref 11.7–15.7)
LYMPHOCYTES # BLD AUTO: 1.8 10E9/L (ref 0.8–5.3)
LYMPHOCYTES NFR BLD AUTO: 25 %
MCH RBC QN AUTO: 32 PG (ref 26.5–33)
MCHC RBC AUTO-ENTMCNC: 32.8 G/DL (ref 31.5–36.5)
MCV RBC AUTO: 98 FL (ref 78–100)
MONOCYTES # BLD AUTO: 0.7 10E9/L (ref 0–1.3)
MONOCYTES NFR BLD AUTO: 10.2 %
NEUTROPHILS # BLD AUTO: 4.5 10E9/L (ref 1.6–8.3)
NEUTROPHILS NFR BLD AUTO: 62.5 %
PLATELET # BLD AUTO: 257 10E9/L (ref 150–450)
PROT SERPL-MCNC: 7.2 G/DL (ref 6.8–8.8)
RBC # BLD AUTO: 3.59 10E12/L (ref 3.8–5.2)
WBC # BLD AUTO: 7.2 10E9/L (ref 4–11)

## 2017-06-27 PROCEDURE — 36415 COLL VENOUS BLD VENIPUNCTURE: CPT | Performed by: INTERNAL MEDICINE

## 2017-06-27 PROCEDURE — 82565 ASSAY OF CREATININE: CPT | Performed by: INTERNAL MEDICINE

## 2017-06-27 PROCEDURE — 85025 COMPLETE CBC W/AUTO DIFF WBC: CPT | Performed by: INTERNAL MEDICINE

## 2017-06-27 PROCEDURE — 80076 HEPATIC FUNCTION PANEL: CPT | Performed by: INTERNAL MEDICINE

## 2017-06-28 NOTE — PROGRESS NOTES
"Planbo[employer] message sent:  \"Ms. Spicer,    Labs are stable.  No evidence of azathioprine toxicity.    Sincerely,  Mario Davenport MD  6/28/2017 6:04 AM\""

## 2017-07-05 ENCOUNTER — ALLIED HEALTH/NURSE VISIT (OUTPATIENT)
Dept: CARDIOLOGY | Facility: CLINIC | Age: 82
End: 2017-07-05
Payer: MEDICARE

## 2017-07-05 DIAGNOSIS — I49.5 SICK SINUS SYNDROME (H): Primary | ICD-10-CM

## 2017-07-05 PROBLEM — Z95.0 CARDIAC PACEMAKER IN SITU: Status: ACTIVE | Noted: 2017-07-05

## 2017-07-05 PROCEDURE — 93288 INTERROG EVL PM/LDLS PM IP: CPT | Mod: 26

## 2017-07-05 NOTE — MR AVS SNAPSHOT
After Visit Summary   7/5/2017    Linda Spicer    MRN: [id number]           Patient Information     Date Of Birth          6/13/1931        Visit Information        Provider Department      7/5/2017 9:00 AM DEVICE CHECK RN CARD [facility]        Today's Diagnoses     Sick sinus syndrome (H)    -  1      Care Instructions    It was a pleasure to see you in clinic today.  Please do not hesitate to call with any questions or concerns.  We look forward to seeing you in clinic at your next device check in 3 months.    Rose Marquez, RN, MS, CCRN  Electrophysiology Nurse Clinician  [facility]    During Business Hours Please Call:  [phone]  After Hours Please Call:  [phone] - select option #4 and ask for job code [phone]                    Follow-ups after your visit        Follow-up notes from your care team     Return in about 3 months (around 10/5/2017) for Pacemaker Check.      Your next 10 appointments already scheduled     Jul 21, 2017  9:30 AM CDT   Level O with BAY 5 INFUSION   [facility] ([facility])    [address] [address] [place] [address] [phone]            Jul 25, 2017  9:30 AM CDT   LAB with BK LAB   [facility] ([facility]    [address] [facility] [address] [phone]           Patient must bring picture ID.  Patient should be prepared to give a urine specimen  Please do not eat 10-12 hours before your appointment if you are coming in fasting for labs on lipids, cholesterol, or glucose (sugar).  Pregnant women should follow their Care Team instructions. Water with medications is okay. Do not drink coffee or other fluids.   If you have concerns about taking  your medications, please ask at office or if scheduling via [employer], send a message by clicking on Secure Messaging, Message Your Care Team.            Aug 01, 2017 10:20 AM  CDT   Return Visit with Mario Davenport MD   [facility] ([facility])    [address] [facility] [address]   [phone]            Aug 18, 2017  9:30 AM CDT   Level O with BAY 9 INFUSION   [facility] ([facility]    [address] [place] [address] [phone]            Sep 11, 2017  1:10 PM CDT   Return Visit with Emeterio Loza MD   [facility] ([facility])    [address] [place] [address] [phone]            Oct 04, 2017  4:30 PM CDT   Return Visit with DEVICE CHECK RN CARD   [facility] ([facility])    [address] [place] [id number] [phone]              Who to contact     If you have questions or need follow up information about today's clinic visit or your schedule please contact [facility] directly at [phone].  Normal or non-critical lab and imaging results will be communicated to you by BovControlhart, letter or phone within 4 business days after the clinic has received the results. If you do not hear from us within 7 days, please contact the clinic through Page Maget or phone. If you have a critical or abnormal lab result, we will notify you by phone as soon as possible.  Submit refill requests through [employer] or call your pharmacy and they will forward the refill request to us. Please allow 3 business days for your refill to be completed.          Additional Information About Your Visit        [email or web address] Information     [employer] gives you secure access to your electronic health record. If you see a primary care provider, you can also send messages to your care team and make appointments. If you have questions, please call your primary care clinic.  If you do not have a primary care provider, please call [phone] and they will assist you.      [employer] is an  electronic gateway that provides easy, online access to your medical records. With [employer] Solutions, you can request a clinic appointment, read your test results, renew a prescription or communicate with your care team.     To access your existing account, please contact your [facility] or call [phone] for assistance.        Care EveryWhere ID     This is your Care EveryWhere ID. This could be used by other organizations to access your [place] medical records  [id number]         Blood Pressure from Last 3 Encounters:   06/23/17 136/74   06/15/17 146/64   05/26/17 125/61    Weight from Last 3 Encounters:   06/23/17 71.6 kg (157 lb 12.8 oz)   06/15/17 69.9 kg (154 lb)   05/26/17 71.8 kg (158 lb 6.4 oz)              We Performed the Following     PM DEVICE PROGRAMMING EVAL, DUAL LEAD PACER        Primary Care Provider Office Phone # Fax #    Tre Lockett [phone]       [facility] [address] [address] [facility] [address]        Equal Access to Services     [facility] : Hadii aad ku hadasho Soomaali, waaxda luqadaha, qaybta kaalmada adeegyada, waxay marisabel moss . So [facility] [phone].    ATENCIÓN: Si habla español, tiene a merchant disposición servicios gratuitos de asistencia lingüística. Alin al [phone].    We comply with applicable federal civil rights laws and [place] laws. We do not discriminate on the basis of race, color, national origin, age, disability sex, sexual orientation or gender identity.            Thank you!     Thank you for choosing [facility]  for your care. Our goal is always to provide you with excellent care. Hearing back from our patients is one way we can continue to improve our services. Please take a few minutes to complete the written survey that you may receive in the mail after your visit with us. Thank you!             Your Updated Medication List - Protect others  around you: Learn how to safely use, store and throw away your medicines at www.disposemymeds.org.          This list is accurate as of: 7/5/17  9:25 AM.  Always use your most recent med list.                   Brand Name Dispense Instructions for use Diagnosis    apixaban ANTICOAGULANT 2.5 MG tablet    ELIQUIS    60 tablet    Take 1 tablet (2.5 mg) by mouth 2 times daily    H/O atrial flutter       azaTHIOprine 50 MG tablet    IMURAN    30 tablet    Take 1 tablet (50 mg) by mouth daily    Rheumatoid arthritis involving multiple sites with positive rheumatoid factor (H), High risk medication use       bismuth subsalicylate 262 MG Tabs     80 tablet    Take 1 tablet by mouth every 4 hours as needed    Acute infectious diarrhea, Enteritis due to Norovirus       Blood Pressure Monitor Kit     1 kit    1 kit daily as needed    CHF (congestive heart failure) (H)       calcium-vitamin D 600-400 MG-UNIT per tablet    CALTRATE     Take 1 tablet by mouth 2 times daily        Carboxymethylcellulose Sodium 1 % Gel      1-2 drops (aka Genteal)        ciprofloxacin 250 MG tablet    CIPRO    90 tablet    Take 1 tablet (250 mg) by mouth daily    Chronic UTI       Cranberry 180 MG Caps      Take 1 capsule by mouth daily Unsure of strength        fish oil-omega-3 fatty acids 1000 MG capsule      Take 2 g by mouth daily. 2 capsules daily        FLAGYL PO      Take 500 mg by mouth 2 times daily    Acute infectious diarrhea       folic acid 1 MG tablet    FOLVITE    100 tablet    Take 1 tablet (1 mg) by mouth daily    Oral aphthae       GENTEAL MILD OP      Apply  to eye daily.        IBANdronate 150 MG tablet    BONIVA    1 tablet    Take 1 tablet (150 mg) by mouth every 30 days Take 60 minutes before am meal with 8 oz. water. Remain upright for 30 minutes.    Osteopenia       levothyroxine 75 MCG tablet    SYNTHROID/LEVOTHROID    90 tablet    TAKE ONE TABLET BY MOUTH EVERY DAY    Hypothyroidism, unspecified type       losartan 25 MG  tablet    COZAAR    180 tablet    Take 2 tablets (50 mg) by mouth daily (with dinner) Hold if SBP less than 110.    Hypertension goal BP (blood pressure) < 140/90       LUCENTIS IO      1 injection every 4 Weeks        metoprolol 25 MG 24 hr tablet    TOPROL-XL    45 tablet    Take 0.5 tablets (12.5 mg) by mouth daily    Paroxysmal atrial flutter (H)       nitroGLYcerin 0.4 MG sublingual tablet    NITROSTAT    25 tablet    Place 1 tablet (0.4 mg) under the tongue every 5 minutes as needed for chest pain    Chest pain       omeprazole 20 MG CR capsule    priLOSEC    90 capsule    Take 1 capsule (20 mg) by mouth daily    Reflux esophagitis       * order for DME     1 Box    Equipment being ordered: Dispense face mask. Mrs. Spicer is immunosuppressed due to rheumatoid arthritis.    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 Use with Albuterol.    Hypoxia       order for DME     1 each    Equipment being ordered: Dispense baffle, for use with nebulizer.    Pneumonia of left upper lobe due to Mycoplasma pneumoniae       * order for DME     1 each    Equipment being ordered: Left wrist splint.    Injury of left hand, initial encounter       * order for DME     1 each    Equipment being ordered: Left wrist splint.    Left wrist injury, initial encounter       PRESERVISION AREDS PO      Take 2 tablets by mouth daily        REFRESH P.M. Oint      Apply  to eye. Daily at bedtime        saccharomyces boulardii 250 MG capsule    FLORASTOR     Take 250 mg by mouth        senna-docusate 8.6-50 MG per tablet    SENOKOT-S;PERICOLACE    120 tablet    Take 2 tablets by mouth At Bedtime Hold if diarrhea occurs.    Constipation, chronic       SPIRONOLACTONE PO      Take 25 mg by mouth every morning Hold if SBP is less than 120.    Congestive heart failure with preserved LV function, NYHA class 3 (H)       triamcinolone 0.1 % cream     KENALOG    453.6 g    Apply sparingly to affected area on face three times daily.    Facial lesion       ZYRTEC ALLERGY 10 MG tablet   Generic drug:  cetirizine      Take 10 mg by mouth At Bedtime        * Notice:  This list has 4 medication(s) that are the same as other medications prescribed for you. Read the directions carefully, and ask your doctor or other care provider to review them with you.

## 2017-07-05 NOTE — PROGRESS NOTES
Patient seen in clinic for evaluation and iterative programming of her Medtronic dual lead pacemaker per MD orders.  Patient is new to the [facility] device clinic today.  Normal pacemaker function.  292 AT/AF episodes recorded - < 1 min - 5 hrs 38 min in duration.  AF burden = 6.1%.  Patient is taking Eliquis.  Intrinsic rhythm = SB @ 58 bpm.  AP = 54.9%.   = 4.7%.  Estimated battery longevity to ZENAIDA = 9 years. Patient reports that she is feeling well and denies specific complaints.  Plan for patient to return to clinic in 3 months.    Dual lead pacemaker

## 2017-07-05 NOTE — PATIENT INSTRUCTIONS
It was a pleasure to see you in clinic today.  Please do not hesitate to call with any questions or concerns.  We look forward to seeing you in clinic at your next device check in 3 months.    Rose Marquez, RN, MS, CCRN  Electrophysiology Nurse Clinician  [facility]    During Business Hours Please Call:  [phone]  After Hours Please Call:  [phone] - select option #4 and ask for job code [phone]

## 2017-07-08 ENCOUNTER — MYC REFILL (OUTPATIENT)
Dept: RHEUMATOLOGY | Facility: CLINIC | Age: 82
End: 2017-07-08

## 2017-07-08 DIAGNOSIS — Z79.899 HIGH RISK MEDICATION USE: ICD-10-CM

## 2017-07-08 DIAGNOSIS — M05.79 RHEUMATOID ARTHRITIS INVOLVING MULTIPLE SITES WITH POSITIVE RHEUMATOID FACTOR (H): ICD-10-CM

## 2017-07-10 RX ORDER — AZATHIOPRINE 50 MG/1
50 TABLET ORAL DAILY
Qty: 30 TABLET | Refills: 3 | OUTPATIENT
Start: 2017-07-10

## 2017-07-10 NOTE — TELEPHONE ENCOUNTER
Message from MyChart:  Original authorizing provider: MD Linda Simons would like a refill of the following medications:  azaTHIOprine (IMURAN) 50 MG tablet [Mario Davenport MD]    Preferred pharmacy: [facility] [place] - [address]    Comment:

## 2017-07-10 NOTE — TELEPHONE ENCOUNTER
Routing refill request to provider for review/approval because:  Drug not on the [facility] refill protocol.

## 2017-07-10 NOTE — TELEPHONE ENCOUNTER
Called and spoke to pharmacy - just picked up Rx on 6/26/17 for a 1 month supply and has 2 refills left.   Called and updated patient on this and forgot she picked up the new rx.   Disregard message.     Dayanara Hernandes RN

## 2017-07-15 ENCOUNTER — ALLIED HEALTH/NURSE VISIT (OUTPATIENT)
Dept: CARDIOLOGY | Facility: CLINIC | Age: 82
End: 2017-07-15
Attending: INTERNAL MEDICINE
Payer: MEDICARE

## 2017-07-15 ENCOUNTER — TELEPHONE (OUTPATIENT)
Dept: CARDIOLOGY | Facility: CLINIC | Age: 82
End: 2017-07-15

## 2017-07-15 DIAGNOSIS — I49.5 SICK SINUS SYNDROME (H): Primary | ICD-10-CM

## 2017-07-15 PROCEDURE — 93296 REM INTERROG EVL PM/IDS: CPT | Mod: ZF

## 2017-07-15 PROCEDURE — 93294 REM INTERROG EVL PM/LDLS PM: CPT | Performed by: INTERNAL MEDICINE

## 2017-07-15 NOTE — TELEPHONE ENCOUNTER
Received page from patient who reports that she has been experiencing #5/10 chest pain this morning.  Patient reports that she has taken 1 SL NTG without relief.  Patient instructed to take SL NTG every 5 minutes x 3 and seek emergency medical attention.  Patient sent a remote pacemaker transmission.  Remote pacemaker transmission received and reviewed.  Device transmission sent per MD orders.  Patient has a Medtronic dual lead pacemaker.  Normal pacemaker function.  20 AT/AF episodes recorded - < 1 min - 2 hrs 42 min in duration.  AF burden = 4.1%.  Last AF episode was recorded on 7/13/17.  Presenting EGM = NSR @ 80 bpm.  AP = 53.9%.   = 3.9%.  Estimated battery longevity to ZENAIDA = 9 years.  Patient notified of interrogation results.  Patient reports that she is still experiencing 5/10 chest pain after taking a 2nd SL NTG.  Patient instructed to call 911 and go the emergency room for further assessment and treatment.  Patient states that her daughter will be at her house any minute and will take her to the [facility] ER.  Patient states she will take a 3rd SL NTG and go to the ER.  Patient encouraged to call device RN with further questions or concerns.    Remote pacemaker transmission

## 2017-07-15 NOTE — MR AVS SNAPSHOT
After Visit Summary   7/15/2017    Linda Spicer    MRN: [id number]           Patient Information     Date Of Birth          6/13/1931        Visit Information        Provider Department      7/15/2017 6:00 AM [facility] REMOTE [facility]        Today's Diagnoses     Sick sinus syndrome (H)    -  1       Follow-ups after your visit        Your next 10 appointments already scheduled     Jul 21, 2017  9:30 AM CDT   Level O with [place] 5 INFUSION   [facility]    [address] [facility] [address]   [phone]            Jul 25, 2017  9:30 AM CDT   LAB with BK LAB   [facility]    [address] [facility] [id number] [phone]           Patient must bring picture ID.  Patient should be prepared to give a urine specimen  Please do not eat 10-12 hours before your appointment if you are coming in fasting for labs on lipids, cholesterol, or glucose (sugar).  Pregnant women should follow their Care Team instructions. Water with medications is okay. Do not drink coffee or other fluids.   If you have concerns about taking  your medications, please ask at office or if scheduling via Dnevnikhart, send a message by clicking on Secure Messaging, Message Your Care Team.            Aug 01, 2017 10:20 AM CDT   Return Visit with Mario Davenport MD   [facility] ([facility])    [address] [facility] [address]   [phone]            Aug 18, 2017  9:30 AM CDT   Level O with [place] [address] [facility] ([facility])    [address] [address] [address]   [phone]            Sep 11, 2017  1:10 PM CDT   Return Visit with Emeterio Loza MD   [facility] ([facility])    [address] [facility] [address]   [phone]            Oct  04, 2017  4:30 PM CDT   Return Visit with DEVICE CHECK RN CARD   [facility] ([facility])    [address] [address] [place] [address] [phone]              Who to contact     If you have questions or need follow up information about today's clinic visit or your schedule please contact [facility] directly at [phone].  Normal or non-critical lab and imaging results will be communicated to you by ChiScanhart, letter or phone within 4 business days after the clinic has received the results. If you do not hear from us within 7 days, please contact the clinic through [employer] or phone. If you have a critical or abnormal lab result, we will notify you by phone as soon as possible.  Submit refill requests through [employer] or call your pharmacy and they will forward the refill request to us. Please allow 3 business days for your refill to be completed.          Additional Information About Your Visit        ChiScanharPinMyPet Information     [employer] gives you secure access to your electronic health record. If you see a primary care provider, you can also send messages to your care team and make appointments. If you have questions, please call your primary care clinic.  If you do not have a primary care provider, please call [phone] and they will assist you.        Care EveryWhere ID     This is your Care EveryWhere ID. This could be used by other organizations to access your [place] medical records  [id number]         Blood Pressure from Last 3 Encounters:   06/23/17 136/74   06/15/17 146/64   05/26/17 125/61    Weight from Last 3 Encounters:   06/23/17 71.6 kg (157 lb 12.8 oz)   06/15/17 69.9 kg (154 lb)   05/26/17 71.8 kg (158 lb 6.4 oz)              We Performed the Following     INTERROGATION DEVICE EVAL REMOTE, PACER/ICD        Primary Care Provider Office Phone # Fax #    Tre Lockett [phone]       [facility] [address]  [place] [facility] [address]        Equal Access to Services     [facility] : Hadii aad ku hadmyrajason Aaronali, wareidda lunoheliadanyha, qasabihata kagigifidencio jaureguidelanofidencio, waxay idiin haydyloncapri quezadairmaaddy moss . So [facility] [phone].    ATENCIÓN: Si habla español, tiene a merchant disposición servicios gratuitos de asistencia lingüística. [facility] [phone].    We comply with applicable federal civil rights laws and [place] laws. We do not discriminate on the basis of race, color, national origin, age, disability sex, sexual orientation or gender identity.            Thank you!     Thank you for choosing [facility]  for your care. Our goal is always to provide you with excellent care. Hearing back from our patients is one way we can continue to improve our services. Please take a few minutes to complete the written survey that you may receive in the mail after your visit with us. Thank you!             Your Updated Medication List - Protect others around you: Learn how to safely use, store and throw away your medicines at www.disposemymeds.org.          This list is accurate as of: 7/15/17 11:59 PM.  Always use your most recent med list.                   Brand Name Dispense Instructions for use Diagnosis    apixaban ANTICOAGULANT 2.5 MG tablet    ELIQUIS    60 tablet    Take 1 tablet (2.5 mg) by mouth 2 times daily    H/O atrial flutter       azaTHIOprine 50 MG tablet    IMURAN    30 tablet    Take 1 tablet (50 mg) by mouth daily    Rheumatoid arthritis involving multiple sites with positive rheumatoid factor (H), High risk medication use       bismuth subsalicylate 262 MG Tabs     80 tablet    Take 1 tablet by mouth every 4 hours as needed    Acute infectious diarrhea, Enteritis due to Norovirus       Blood Pressure Monitor Kit     1 kit    1 kit daily as needed    CHF (congestive heart failure) (H)       calcium-vitamin D 600-400 MG-UNIT per tablet    CALTRATE     Take 1 tablet by mouth 2 times daily         Carboxymethylcellulose Sodium 1 % Gel      1-2 drops (aka Genteal)        ciprofloxacin 250 MG tablet    CIPRO    90 tablet    Take 1 tablet (250 mg) by mouth daily    Chronic UTI       Cranberry 180 MG Caps      Take 1 capsule by mouth daily Unsure of strength        fish oil-omega-3 fatty acids 1000 MG capsule      Take 2 g by mouth daily. 2 capsules daily        FLAGYL PO      Take 500 mg by mouth 2 times daily    Acute infectious diarrhea       folic acid 1 MG tablet    FOLVITE    100 tablet    Take 1 tablet (1 mg) by mouth daily    Oral aphthae       GENTEAL MILD OP      Apply  to eye daily.        IBANdronate 150 MG tablet    BONIVA    1 tablet    Take 1 tablet (150 mg) by mouth every 30 days Take 60 minutes before am meal with 8 oz. water. Remain upright for 30 minutes.    Osteopenia       levothyroxine 75 MCG tablet    SYNTHROID/LEVOTHROID    90 tablet    TAKE ONE TABLET BY MOUTH EVERY DAY    Hypothyroidism, unspecified type       losartan 25 MG tablet    COZAAR    180 tablet    Take 2 tablets (50 mg) by mouth daily (with dinner) Hold if SBP less than 110.    Hypertension goal BP (blood pressure) < 140/90       LUCENTIS IO      1 injection every 4 Weeks        metoprolol 25 MG 24 hr tablet    TOPROL-XL    45 tablet    Take 0.5 tablets (12.5 mg) by mouth daily    Paroxysmal atrial flutter (H)       nitroGLYcerin 0.4 MG sublingual tablet    NITROSTAT    25 tablet    Place 1 tablet (0.4 mg) under the tongue every 5 minutes as needed for chest pain    Chest pain       omeprazole 20 MG CR capsule    priLOSEC    90 capsule    Take 1 capsule (20 mg) by mouth daily    Reflux esophagitis       * order for DME     1 Box    Equipment being ordered: Dispense face mask. Mrs. Spicer is immunosuppressed due to rheumatoid arthritis.    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 Use with Albuterol.    Hypoxia       order  for DME     1 each    Equipment being ordered: Dispense baffle, for use with nebulizer.    Pneumonia of left upper lobe due to Mycoplasma pneumoniae       * order for DME     1 each    Equipment being ordered: Left wrist splint.    Injury of left hand, initial encounter       * order for DME     1 each    Equipment being ordered: Left wrist splint.    Left wrist injury, initial encounter       PRESERVISION AREDS PO      Take 2 tablets by mouth daily        REFRESH P.M. Oint      Apply  to eye. Daily at bedtime        saccharomyces boulardii 250 MG capsule    FLORASTOR     Take 250 mg by mouth        senna-docusate 8.6-50 MG per tablet    SENOKOT-S;PERICOLACE    120 tablet    Take 2 tablets by mouth At Bedtime Hold if diarrhea occurs.    Constipation, chronic       SPIRONOLACTONE PO      Take 25 mg by mouth every morning Hold if SBP is less than 120.    Congestive heart failure with preserved LV function, NYHA class 3 (H)       triamcinolone 0.1 % cream    KENALOG    453.6 g    Apply sparingly to affected area on face three times daily.    Facial lesion       ZYRTEC ALLERGY 10 MG tablet   Generic drug:  cetirizine      Take 10 mg by mouth At Bedtime        * Notice:  This list has 4 medication(s) that are the same as other medications prescribed for you. Read the directions carefully, and ask your doctor or other care provider to review them with you.

## 2017-07-17 ENCOUNTER — TELEPHONE (OUTPATIENT)
Dept: NURSING | Facility: CLINIC | Age: 82
End: 2017-07-17

## 2017-07-17 NOTE — PROGRESS NOTES
Received page from patient who reports that she has been experiencing #5/10 chest pain this morning.  Patient reports that she has taken 1 SL NTG without relief.  Patient instructed to take SL NTG every 5 minutes x 3 and seek emergency medical attention.  Patient sent a remote pacemaker transmission.  Remote pacemaker transmission received and reviewed.  Device transmission sent per MD orders.  Patient has a Medtronic dual lead pacemaker.  Normal pacemaker function.  20 AT/AF episodes recorded - < 1 min - 2 hrs 42 min in duration.  AF burden = 4.1%.  Last AF episode was recorded on 7/13/17.  Presenting EGM = NSR @ 80 bpm.  AP = 53.9%.   = 3.9%.  Estimated battery longevity to ZENAIDA = 9 years.  Patient notified of interrogation results.  Patient reports that she is still experiencing 5/10 chest pain after taking a 2nd SL NTG.  Patient instructed to call 911 and go the emergency room for further assessment and treatment.  Patient states that her daughter will be at her house any minute and will take her to the [facility] ER.  Patient states she will take a 3rd SL NTG and go to the ER.  Patient encouraged to call device RN with further questions or concerns.     Remote pacemaker transmission

## 2017-07-17 NOTE — TELEPHONE ENCOUNTER
Patient called. States she had gone to [facility] in [place] on July 15 th for chest pains which have been resolved at this time. We are unable to see the records from [facility], will call and try to have faxed to Dr Loza.

## 2017-07-18 NOTE — TELEPHONE ENCOUNTER
"Left message asking patient to call back. Received records from [facility], It was recommended to follow up with Dr Loza for \" provocative cardiac testing\" .  "

## 2017-07-18 NOTE — TELEPHONE ENCOUNTER
Patient called back..Explained that I had reviewed records and the physician thought it would be best to follow up with Dr Loza. Appointment was made. She will call back if she cannot get a ride.

## 2017-07-21 ENCOUNTER — TELEPHONE (OUTPATIENT)
Dept: RHEUMATOLOGY | Facility: CLINIC | Age: 82
End: 2017-07-21

## 2017-07-21 ENCOUNTER — INFUSION THERAPY VISIT (OUTPATIENT)
Dept: INFUSION THERAPY | Facility: CLINIC | Age: 82
End: 2017-07-21
Payer: MEDICARE

## 2017-07-21 VITALS
BODY MASS INDEX: 28.17 KG/M2 | WEIGHT: 159 LBS | HEART RATE: 69 BPM | DIASTOLIC BLOOD PRESSURE: 74 MMHG | RESPIRATION RATE: 20 BRPM | TEMPERATURE: 97.8 F | OXYGEN SATURATION: 97 % | SYSTOLIC BLOOD PRESSURE: 145 MMHG

## 2017-07-21 DIAGNOSIS — R30.0 DYSURIA: ICD-10-CM

## 2017-07-21 DIAGNOSIS — Z79.899 HIGH RISK MEDICATION USE: ICD-10-CM

## 2017-07-21 DIAGNOSIS — M05.79 RHEUMATOID ARTHRITIS INVOLVING MULTIPLE SITES WITH POSITIVE RHEUMATOID FACTOR (H): ICD-10-CM

## 2017-07-21 DIAGNOSIS — M05.79 RHEUMATOID ARTHRITIS INVOLVING MULTIPLE SITES WITH POSITIVE RHEUMATOID FACTOR (H): Primary | ICD-10-CM

## 2017-07-21 LAB
ALBUMIN SERPL-MCNC: 3.5 G/DL (ref 3.4–5)
ALBUMIN UR-MCNC: NEGATIVE MG/DL
ALP SERPL-CCNC: 63 U/L (ref 40–150)
ALT SERPL W P-5'-P-CCNC: 31 U/L (ref 0–50)
APPEARANCE UR: CLEAR
AST SERPL W P-5'-P-CCNC: 28 U/L (ref 0–45)
BASOPHILS # BLD AUTO: 0 10E9/L (ref 0–0.2)
BASOPHILS NFR BLD AUTO: 0.4 %
BILIRUB DIRECT SERPL-MCNC: 0.1 MG/DL (ref 0–0.2)
BILIRUB SERPL-MCNC: 0.4 MG/DL (ref 0.2–1.3)
BILIRUB UR QL STRIP: NEGATIVE
COLOR UR AUTO: YELLOW
CREAT SERPL-MCNC: 1.24 MG/DL (ref 0.52–1.04)
DIFFERENTIAL METHOD BLD: ABNORMAL
EOSINOPHIL # BLD AUTO: 0.1 10E9/L (ref 0–0.7)
EOSINOPHIL NFR BLD AUTO: 1.8 %
ERYTHROCYTE [DISTWIDTH] IN BLOOD BY AUTOMATED COUNT: 13.7 % (ref 10–15)
GFR SERPL CREATININE-BSD FRML MDRD: 41 ML/MIN/1.7M2
GLUCOSE UR STRIP-MCNC: NEGATIVE MG/DL
HCT VFR BLD AUTO: 35.3 % (ref 35–47)
HGB BLD-MCNC: 11.9 G/DL (ref 11.7–15.7)
HGB UR QL STRIP: NEGATIVE
KETONES UR STRIP-MCNC: NEGATIVE MG/DL
LEUKOCYTE ESTERASE UR QL STRIP: NEGATIVE
LYMPHOCYTES # BLD AUTO: 1.3 10E9/L (ref 0.8–5.3)
LYMPHOCYTES NFR BLD AUTO: 23.6 %
MCH RBC QN AUTO: 32.2 PG (ref 26.5–33)
MCHC RBC AUTO-ENTMCNC: 33.7 G/DL (ref 31.5–36.5)
MCV RBC AUTO: 96 FL (ref 78–100)
MONOCYTES # BLD AUTO: 0.6 10E9/L (ref 0–1.3)
MONOCYTES NFR BLD AUTO: 11.3 %
NEUTROPHILS # BLD AUTO: 3.6 10E9/L (ref 1.6–8.3)
NEUTROPHILS NFR BLD AUTO: 62.9 %
NITRATE UR QL: NEGATIVE
PH UR STRIP: 5.5 PH (ref 5–7)
PLATELET # BLD AUTO: 246 10E9/L (ref 150–450)
PROT SERPL-MCNC: 7.3 G/DL (ref 6.8–8.8)
RBC # BLD AUTO: 3.69 10E12/L (ref 3.8–5.2)
SP GR UR STRIP: 1.01 (ref 1–1.03)
URN SPEC COLLECT METH UR: NORMAL
UROBILINOGEN UR STRIP-MCNC: NORMAL MG/DL (ref 0–2)
WBC # BLD AUTO: 5.7 10E9/L (ref 4–11)

## 2017-07-21 PROCEDURE — 85025 COMPLETE CBC W/AUTO DIFF WBC: CPT | Performed by: FAMILY MEDICINE

## 2017-07-21 PROCEDURE — 36415 COLL VENOUS BLD VENIPUNCTURE: CPT | Performed by: FAMILY MEDICINE

## 2017-07-21 PROCEDURE — 80076 HEPATIC FUNCTION PANEL: CPT | Performed by: FAMILY MEDICINE

## 2017-07-21 PROCEDURE — 81003 URINALYSIS AUTO W/O SCOPE: CPT | Performed by: FAMILY MEDICINE

## 2017-07-21 PROCEDURE — 82565 ASSAY OF CREATININE: CPT | Performed by: FAMILY MEDICINE

## 2017-07-21 PROCEDURE — 99207 ZZC NO CHARGE LOS: CPT

## 2017-07-21 PROCEDURE — 96365 THER/PROPH/DIAG IV INF INIT: CPT | Performed by: INTERNAL MEDICINE

## 2017-07-21 RX ADMIN — Medication 100 ML: at 09:46

## 2017-07-21 ASSESSMENT — PAIN SCALES - GENERAL: PAINLEVEL: NO PAIN (0)

## 2017-07-21 NOTE — TELEPHONE ENCOUNTER
Called and spoke to patient and she reports she has enough medication for another week but tried to refill her medication and said pharmacy said she already picked it up and couldn't get a refill yet because it was too soon.   Pharmacy said she was able to get a refill and will fill it for her.   Updated patient that Rx is being filled and will be available soon.     Dayanara Hernandes RN

## 2017-07-21 NOTE — PROGRESS NOTES
"Infusion Nursing Note:  Linda KATEY Orellana presents today for Orencia.    Patient seen by provider today: No   present during visit today: Not Applicable.    Note: N/A.    Intravenous Access:  Peripheral IV placed.    Treatment Conditions:  Rheumatology Infusion Checklist: PRIOR TO INFUSION OF BIOLOGICAL MEDICATIONS OR ANY OF THESE AS LISTED: Orencia (abatacept) \".rheumbiologicalchecklist\"    Prior to Infusion of biological medications or any of these as listed:    1. Elevated temperature, fever, chills, productive cough or abnormal vital signs, night sweats, coughing up blood or sputum, no appetite or abnormal vital signs : NO    2. Open wounds or new incisions: NO    3. Recent hospitalization: NO    4.  Recent surgeries:  NO    5. Any upcoming surgeries or dental procedures?:NO    6. Any current or recent bouts of illness or infection? On any antibiotics? : NO    7. Any new, sudden or worsening abdominal pain :NO    8. Vaccination within 4 weeks? Patient or someone in the household is scheduled to receive vaccination? No live virus vaccines prior to or during treatment :NO    9. Any nervous system diseases [i.e. multiple sclerosis, Guillain-[place]apwai, seizures, neurological  changes]: NO    10. Pregnant or breast feeding; or plans on pregnancy in the future: NO    11. Signs of worsening depression or suicidal ideations while taking benlysta:NO    12. New-onset medical symptoms: NO    13.  New cancer diagnosis or on chemotherapy or radiation NO    14.  Evaluate for any sign of active TB [Unexplained weight loss, Loss of appetite, Night sweats, Fever, Fatigue, Chills, Coughing for 3 weeks or longer, Hemoptysis (coughing up blood), Chest pain]: NO    **Note: If answered yes to any of the above, hold the infusion and contact ordering rheumatologist or on-call rheumatologist.   .        Post Infusion Assessment:  Patient tolerated infusion without incident.  Blood return noted pre and post infusion.  Site patent " and intact, free from redness, edema or discomfort.  Access discontinued per protocol.  Rheumatology Post Infusion: POST-INFUSION OF BIOLOGICAL MEDICATION:    Reviewed with patient.  Given biologic medication or medication hand-out. Inform patient if any fever, chills or signs of infection, new symptoms, abdominal pain, heart palpitations, shortness of breath, reaction, weakness, neurological changes, seek medical attention immediately and should not receive infusions. No live virus vaccines prior to or during treatment or up to 6 months post infusion. If the patient has an upcoming procedure or surgery, this should be discussed with the rheumatologist and surgeon or provider..      Discharge Plan:   Patient discharged in stable condition accompanied by:  and daughter.  Departure Mode: Ambulatory.    Trinity Villalpando RN

## 2017-07-21 NOTE — MR AVS SNAPSHOT
After Visit Summary   7/21/2017    Linda Spicer    MRN: [id number]           Patient Information     Date Of Birth          6/13/1931        Visit Information        Provider Department      7/21/2017 9:30 AM BAY 5 INFUSION [facility]        Today's Diagnoses     Rheumatoid arthritis involving multiple sites with positive rheumatoid factor (H)    -  1       Follow-ups after your visit        Your next 10 appointments already scheduled     Jul 31, 2017  3:50 PM CDT   Return Visit with Emeterio Loza MD   [facility]    [address] [place] [id number]   [phone]            Aug 01, 2017 10:20 AM CDT   Return Visit with Mario Davenport MD   [facility]    [address] [place] [id number]   [phone]            Aug 18, 2017  9:30 AM CDT   Level O with BAY 9 INFUSION   [facility]    [address] [place] [id number] [phone]            Sep 11, 2017  1:10 PM CDT   Return Visit with Emeterio Loza MD   [facility]    [id number] [place] [id number] [phone]            Sep 15, 2017  9:30 AM CDT   Level O with BAY 1 INFUSION   [facility])    [address] [place] [id number] [phone]            Oct 04, 2017  4:30 PM CDT   Return Visit with DEVICE CHECK RN CARD   [facility] ([facility])    [address] [place] [id number] [phone]              Who to contact     If you have questions or need follow up information about today's clinic visit or your schedule please contact [facility] directly at [phone].  Normal or non-critical lab and imaging results will be  communicated to you by [email or web address], letter or phone within 4 business days after the clinic has received the results. If you do not hear from us within 7 days, please contact the clinic through [employer] or phone. If you have a critical or abnormal lab result, we will notify you by phone as soon as possible.  Submit refill requests through [employer] Online or call your pharmacy and they will forward the refill request to us. Please allow 3 business days for your refill to be completed.          Additional Information About Your Visit        [employer] Online Information     [employer] gives you secure access to your electronic health record. If you see a primary care provider, you can also send messages to your care team and make appointments. If you have questions, please call your primary care clinic.  If you do not have a primary care provider, please call [phone] and they will assist you.      [employer] Online is an electronic gateway that provides easy, online access to your medical records. With [employer] Online, you can request a clinic appointment, read your test results, renew a prescription or communicate with your care team.     To access your existing account, please contact your [facility] or call [phone] for assistance.        Care EveryWhere ID     This is your Care EveryWhere ID. This could be used by other organizations to access your [facility] medical records  [id number]        Your Vitals Were     Pulse Temperature Respirations Pulse Oximetry BMI (Body Mass Index)       69 97.8  F (36.6  C) (Oral) 20 97% 28.17 kg/m2        Blood Pressure from Last 3 Encounters:   07/21/17 145/74   06/23/17 136/74   06/15/17 146/64    Weight from Last 3 Encounters:   07/21/17 72.1 kg (159 lb)   06/23/17 71.6 kg (157 lb 12.8 oz)   06/15/17 69.9 kg (154 lb)              Today, you had the following     No orders found for display       Primary Care Provider Office Phone # Fax #    Tre Lockett [phone]  [phone]       [facility] [address]  [facility] [address]        Equal Access to Services     [facility] : Hadii aad ku hadyakelin Sojordin, waaxda luqadaha, qaybta kaalmada aderyanneda, zahraa chazin hayaan kimberlynicolas kamara laVickiemelina skinner. So [facility] [phone].    ATENCIÓN: Si habla español, tiene a merchant disposición servicios gratuitos de asistencia lingüística. Llame al [phone].    We comply with applicable federal civil rights laws and [place] laws. We do not discriminate on the basis of race, color, national origin, age, disability sex, sexual orientation or gender identity.            Thank you!     Thank you for choosing [facility]  for your care. Our goal is always to provide you with excellent care. Hearing back from our patients is one way we can continue to improve our services. Please take a few minutes to complete the written survey that you may receive in the mail after your visit with us. Thank you!             Your Updated Medication List - Protect others around you: Learn how to safely use, store and throw away your medicines at www.disposemymeds.org.          This list is accurate as of: 7/21/17 10:56 AM.  Always use your most recent med list.                   Brand Name Dispense Instructions for use Diagnosis    apixaban ANTICOAGULANT 2.5 MG tablet    ELIQUIS    60 tablet    Take 1 tablet (2.5 mg) by mouth 2 times daily    H/O atrial flutter       azaTHIOprine 50 MG tablet    IMURAN    30 tablet    Take 1 tablet (50 mg) by mouth daily    Rheumatoid arthritis involving multiple sites with positive rheumatoid factor (H), High risk medication use       bismuth subsalicylate 262 MG Tabs     80 tablet    Take 1 tablet by mouth every 4 hours as needed    Acute infectious diarrhea, Enteritis due to Norovirus       Blood Pressure Monitor Kit     1 kit    1 kit daily as needed    CHF (congestive heart failure) (H)       calcium-vitamin D 600-400 MG-UNIT per tablet     CALTRATE     Take 1 tablet by mouth 2 times daily        Carboxymethylcellulose Sodium 1 % Gel      1-2 drops (aka Genteal)        ciprofloxacin 250 MG tablet    CIPRO    90 tablet    Take 1 tablet (250 mg) by mouth daily    Chronic UTI       Cranberry 180 MG Caps      Take 1 capsule by mouth daily Unsure of strength        fish oil-omega-3 fatty acids 1000 MG capsule      Take 2 g by mouth daily. 2 capsules daily        FLAGYL PO      Take 500 mg by mouth 2 times daily    Acute infectious diarrhea       folic acid 1 MG tablet    FOLVITE    100 tablet    Take 1 tablet (1 mg) by mouth daily    Oral aphthae       GENTEAL MILD OP      Apply  to eye daily.        IBANdronate 150 MG tablet    BONIVA    1 tablet    Take 1 tablet (150 mg) by mouth every 30 days Take 60 minutes before am meal with 8 oz. water. Remain upright for 30 minutes.    Osteopenia       levothyroxine 75 MCG tablet    SYNTHROID/LEVOTHROID    90 tablet    TAKE ONE TABLET BY MOUTH EVERY DAY    Hypothyroidism, unspecified type       losartan 25 MG tablet    COZAAR    180 tablet    Take 2 tablets (50 mg) by mouth daily (with dinner) Hold if SBP less than 110.    Hypertension goal BP (blood pressure) < 140/90       LUCENTIS IO      1 injection every 4 Weeks        metoprolol 25 MG 24 hr tablet    TOPROL-XL    45 tablet    Take 0.5 tablets (12.5 mg) by mouth daily    Paroxysmal atrial flutter (H)       nitroGLYcerin 0.4 MG sublingual tablet    NITROSTAT    25 tablet    Place 1 tablet (0.4 mg) under the tongue every 5 minutes as needed for chest pain    Chest pain       omeprazole 20 MG CR capsule    priLOSEC    90 capsule    Take 1 capsule (20 mg) by mouth daily    Reflux esophagitis       * order for DME     1 Box    Equipment being ordered: Dispense face mask. Mrs. Spicer is immunosuppressed due to rheumatoid arthritis.    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 Use with Albuterol.    Hypoxia       order for DME     1 each    Equipment being ordered: Dispense baffle, for use with nebulizer.    Pneumonia of left upper lobe due to Mycoplasma pneumoniae       * order for DME     1 each    Equipment being ordered: Left wrist splint.    Injury of left hand, initial encounter       * order for DME     1 each    Equipment being ordered: Left wrist splint.    Left wrist injury, initial encounter       PRESERVISION AREDS PO      Take 2 tablets by mouth daily        REFRESH P.M. Oint      Apply  to eye. Daily at bedtime        saccharomyces boulardii 250 MG capsule    FLORASTOR     Take 250 mg by mouth        senna-docusate 8.6-50 MG per tablet    SENOKOT-S;PERICOLACE    120 tablet    Take 2 tablets by mouth At Bedtime Hold if diarrhea occurs.    Constipation, chronic       SPIRONOLACTONE PO      Take 25 mg by mouth every morning Hold if SBP is less than 120.    Congestive heart failure with preserved LV function, NYHA class 3 (H)       triamcinolone 0.1 % cream    KENALOG    453.6 g    Apply sparingly to affected area on face three times daily.    Facial lesion       ZYRTEC ALLERGY 10 MG tablet   Generic drug:  cetirizine      Take 10 mg by mouth At Bedtime        * Notice:  This list has 4 medication(s) that are the same as other medications prescribed for you. Read the directions carefully, and ask your doctor or other care provider to review them with you.

## 2017-07-24 ENCOUNTER — CARE COORDINATION (OUTPATIENT)
Dept: CARE COORDINATION | Facility: CLINIC | Age: 82
End: 2017-07-24

## 2017-07-24 NOTE — PROGRESS NOTES
"Clinic Care Coordination Contact  Care Team Conversations    Patient calls into care coordination requesting advice from Dr. Lockett.  Patient states her creatinine was elevated on 7/21/17 at 1.24.  Creatinine   Date Value Ref Range Status   07/21/2017 1.24 (H) 0.52 - 1.04 mg/dL Final     Component      Latest Ref Rng & Units 6/27/2017   Creatinine      0.52 - 1.04 mg/dL 1.19 (H)     Patient verbalizes concern that this has risen.  Patient also reports persistent loose stools as well as lower abdominal discomfort and attributes this to her diverticulosis.  She denies fever, but states \"I just don't feel good.\"  Patient also reports a 2-3 lb weight gain recently.  She states she normally weighs 153-154 lbs and is now 156 lbs.  She denies any increase in shortness of breath, edema or chest pain.    Patient is inquiring if Dr. Lockett would want to see her for an appointment, wait until her cardiology appointment 7/31/17, rheumatology appointment 8/1/17 or of any other recommendations.    Please advise.    Melissa Behl BSN, RN, N  [facility] Care Coordinator  [phone]  [email or web address]       "

## 2017-07-24 NOTE — PROGRESS NOTES
This writer attempted to contact Linda on 07/24/17      Reason for call Provider's recommendation and left message to return call.      When patient calls back, please contact clinic RN team. If no one available, document that pt called and route to care team. routine priority.          Dayanara Luna RN

## 2017-07-24 NOTE — PROGRESS NOTES
I recommend appointment due to loose stools and abdomen.  Serum creatinine usually fluctuates; she had serum creatinine of 1.31 last 3/24/2017.

## 2017-07-26 ENCOUNTER — OFFICE VISIT (OUTPATIENT)
Dept: FAMILY MEDICINE | Facility: CLINIC | Age: 82
End: 2017-07-26
Payer: MEDICARE

## 2017-07-26 ENCOUNTER — RADIANT APPOINTMENT (OUTPATIENT)
Dept: GENERAL RADIOLOGY | Facility: CLINIC | Age: 82
End: 2017-07-26
Attending: INTERNAL MEDICINE
Payer: MEDICARE

## 2017-07-26 VITALS
BODY MASS INDEX: 28.35 KG/M2 | TEMPERATURE: 97.4 F | HEART RATE: 79 BPM | OXYGEN SATURATION: 100 % | DIASTOLIC BLOOD PRESSURE: 78 MMHG | SYSTOLIC BLOOD PRESSURE: 137 MMHG | HEIGHT: 63 IN | WEIGHT: 160 LBS

## 2017-07-26 DIAGNOSIS — R14.0 ABDOMINAL DISTENTION: ICD-10-CM

## 2017-07-26 DIAGNOSIS — N18.30 CKD (CHRONIC KIDNEY DISEASE) STAGE 3, GFR 30-59 ML/MIN (H): ICD-10-CM

## 2017-07-26 DIAGNOSIS — A09 ACUTE INFECTIOUS DIARRHEA: ICD-10-CM

## 2017-07-26 DIAGNOSIS — K21.9 GASTROESOPHAGEAL REFLUX DISEASE WITHOUT ESOPHAGITIS: ICD-10-CM

## 2017-07-26 DIAGNOSIS — R19.7 ACUTE DIARRHEA: Primary | ICD-10-CM

## 2017-07-26 LAB
CREAT SERPL-MCNC: 1.32 MG/DL (ref 0.52–1.04)
CREAT UR-MCNC: 42 MG/DL
GFR SERPL CREATININE-BSD FRML MDRD: 38 ML/MIN/1.7M2
MICROALBUMIN UR-MCNC: 20 MG/L
MICROALBUMIN/CREAT UR: 48.2 MG/G CR (ref 0–25)

## 2017-07-26 PROCEDURE — 83993 ASSAY FOR CALPROTECTIN FECAL: CPT | Performed by: INTERNAL MEDICINE

## 2017-07-26 PROCEDURE — 82043 UR ALBUMIN QUANTITATIVE: CPT | Performed by: INTERNAL MEDICINE

## 2017-07-26 PROCEDURE — 74010 XR KUB: CPT | Mod: 52

## 2017-07-26 PROCEDURE — 82565 ASSAY OF CREATININE: CPT | Performed by: INTERNAL MEDICINE

## 2017-07-26 PROCEDURE — 87506 IADNA-DNA/RNA PROBE TQ 6-11: CPT | Performed by: INTERNAL MEDICINE

## 2017-07-26 PROCEDURE — 36415 COLL VENOUS BLD VENIPUNCTURE: CPT | Performed by: INTERNAL MEDICINE

## 2017-07-26 PROCEDURE — 99214 OFFICE O/P EST MOD 30 MIN: CPT | Performed by: INTERNAL MEDICINE

## 2017-07-26 NOTE — PATIENT INSTRUCTIONS
This summary includes the important diagnoses, test, medications and other important parts of your medical history.  Below are a few good we sites you can use to learn more about these.     Www.Grow the Planet.org : Up to date and easily searchable information on multiple topics.  Www.Grow the Planet.org/Pharmacy/c_539084.asp : [facility] $4.99 medications  Www.medlineplus.gov : medication info, interactive tutorials, watch real surgeries online  Www.familydoctor.org : good info from the Academy of Family Physicians  Www.mayoEferioinic.com : good info from the [facility]  Www.cdc.gov : public health info, travel advisories, epidemics (H1N1)  Www.aap.org : children's health info, normal development, vaccinations  Www.health.[facility].[place].us : [place] dept of heat, public health issues in [place], N1N1    Based on your medical history and these are the current health maintenance or preventive care services that you are due for (some may have been done at this visit:)  Health Maintenance Due   Topic Date Due     LIPID MONITORING Q1 YEAR  06/03/2017     =================================================================================  Normal Values   Blood pressure  <140/90 for most adults    <130/80 for some chronic diseases (ask your care team about yours)    BMI (body mass index)  18.5-25 kg/m2 (based on height and weight)     Thank you for visiting [facility]    Normal or non-critical lab and imaging results will be communicated to you by MyChart, letter or phone within 7 days.  If you do not hear from us within 10 days, please call the clinic. If you have a critical or abnormal lab result, we will notify you by phone as soon as possible.     If you have any questions regarding your visit please contact:     Team Comfort:   Clinic Hours Telephone Number   Dr. Aravind Novak   7am-5pm  Monday - Friday [phone]  [facility]   Pharmacy  8am-8pm Monday-Thursday      8am-6pm Friday  9am-5pm Saturday-Sunday [phone]   Urgent Care 11am-8pm Monday-Friday        9am-5pm Saturday-Sunday [phone]     After hours, weekend or if you need to make an appointment with your primary provider please call [phone].   After Hours nurse advise: call [facility]: [phone]    Medication Refills:  Call your pharmacy and they will forward the refill to us. Please allow 3 business days for your refills to be completed.    Use Ey[employer]reight (secure email communication and access to your chart) to send your primary care provider a message or make an appointment. Ask someone on your Team how to sign up for Ey[employer]ight. To log on to [employer] or for more information in NextPage please visit the website at [email or web address].  As of October 8, 2013, all password changes, disabled accounts, or ID changes in Eyefreight/MyHealth will be done by our Access Services Department.   If you need help with your account or password, call: [phone]. Clinic staff no longer has the ability to change passwords.

## 2017-07-26 NOTE — NURSING NOTE
"Chief Complaint   Patient presents with     Sick     patient just don't feel good       Initial /78 (BP Location: Left arm, Patient Position: Chair, Cuff Size: Adult Regular)  Pulse 79  Temp 97.4  F (36.3  C) (Oral)  Ht 5' 3\" (1.6 m)  Wt 160 lb (72.6 kg)  SpO2 100%  BMI 28.34 kg/m2 Estimated body mass index is 28.34 kg/(m^2) as calculated from the following:    Height as of this encounter: 5' 3\" (1.6 m).    Weight as of this encounter: 160 lb (72.6 kg).  Medication Reconciliation: complete     Jorge Forrester MA      "

## 2017-07-26 NOTE — MR AVS SNAPSHOT
After Visit Summary   7/26/2017    Linda Spicer    MRN: [id number]           Patient Information     Date Of Birth          6/13/1931        Visit Information        Provider Department      7/26/2017 10:20 AM Tre Lockett MD [facility]        Today's Diagnoses     Acute diarrhea    -  1    Abdominal distention        CKD (chronic kidney disease) stage 3, GFR 30-59 ml/min        Gastroesophageal reflux disease without esophagitis          Care Instructions    This summary includes the important diagnoses, test, medications and other important parts of your medical history.  Below are a few good we sites you can use to learn more about these.     Www.[employer] Skate[employer].Actelis Networks : Up to date and easily searchable information on multiple topics.  Www.[facility] Hospital[facility]ace[facility] Logistics.Actelis Networks/Pharmacy/c_539084.asp : [facility] $4.99 medications  Www.MyLorry.gov : medication info, interactive tutorials, watch real surgeries online  Www.familydoctor.org : good info from the Academy of Family Physicians  Www.RealGravity.snagajob.com : good info from the [facility]  Www.cdc.gov : public health info, travel advisories, epidemics (H1N1)  Www.aap.org : children's health info, normal development, vaccinations  Www.health.[facility].[place].us : [place] dept of heatlh, public health issues in [place], N1N1    Based on your medical history and these are the current health maintenance or preventive care services that you are due for (some may have been done at this visit:)  Health Maintenance Due   Topic Date Due     LIPID MONITORING Q1 YEAR  06/03/2017     =================================================================================  Normal Values   Blood pressure  <140/90 for most adults    <130/80 for some chronic diseases (ask your care team about yours)    BMI (body mass index)  18.5-25 kg/m2 (based on height and weight)     Thank you for visiting [facility]    Normal or non-critical lab and imaging results will be  communicated to you by MyChart, letter or phone within 7 days.  If you do not hear from us within 10 days, please call the clinic. If you have a critical or abnormal lab result, we will notify you by phone as soon as possible.     If you have any questions regarding your visit please contact:     Team Comfort:   Clinic Hours Telephone Number   Dr. Aravind Novak   7am-5pm  Monday - Friday [phone]  Viktor RN   Pharmacy 8am-8pm Monday-Thursday      8am-6pm Friday  9am-5pm Saturday-Sunday [phone]   Urgent Care 11am-8pm Monday-Friday        9am-5pm Saturday-Sunday [phone]     After hours, weekend or if you need to make an appointment with your primary provider please call [phone].   After Hours nurse advise: call [facility]: [phone]    Medication Refills:  Call your pharmacy and they will forward the refill to us. Please allow 3 business days for your refills to be completed.    Use Prism Microwavet (secure email communication and access to your chart) to send your primary care provider a message or make an appointment. Ask someone on your Team how to sign up for AutoSpot. To log on to Cause.it or for more information in Gennio please visit the website at [email or web address].  As of October 8, 2013, all password changes, disabled accounts, or ID changes in AutoSpot/MyHealth will be done by our Access Services Department.   If you need help with your account or password, call: [phone]. Clinic staff no longer has the ability to change passwords.             Follow-ups after your visit        Follow-up notes from your care team     Return if symptoms worsen or fail to improve.      Your next 10 appointments already scheduled     Aug 18, 2017  9:30 AM CDT   Level O with [address] ([facility])    [address] [place] [address]    [phone]            Sep 11, 2017  1:10 PM CDT   [facility] Em[facility]io Loza MD   [facility]    [address] [place] [address]   [phone]            Sep 15, 2017  9:30 AM CDT   Level O with [address] INFUSION   [facility] ([facility])    [address] [place] [address]   [phone]            Oct 04, 2017  4:30 PM CDT   Return Visit with DEVICE CHECK RN CARD   [facility] ([facility])    [address] [place] [address]   [phone]            Nov 07, 2017  9:20 AM CST   [facility] Visit with Mario Davenport MD   [facility]    [address] [facility] [address] [phone]              Who to contact     If you have questions or need follow up information about today's clinic visit or your schedule please contact [facility] directly at [phone].  Normal or non-critical lab and imaging results will be communicated to you by MyChart, letter or phone within 4 business days after the clinic has received the results. If you do not hear from us within 7 days, please contact the clinic through Ubihart or phone. If you have a critical or abnormal lab result, we will notify you by phone as soon as possible.  Submit refill requests through George Mobile or call your pharmacy and they will forward the refill request to us. Please allow 3 business days for your refill to be completed.          Additional Information About Your Visit        Ubihart Information     George Mobile gives you secure access to your electronic health record. If you see a primary care provider, you can also send messages to your care team and make appointments. If you have questions, please call your primary care clinic.  If you do not have a primary care provider, please call [phone] and  "they will assist you.        Care EveryWhere ID     This is your Care EveryWhere ID. This could be used by other organizations to access your [facility] medical records  [id number]        Your Vitals Were     Pulse Temperature Height Pulse Oximetry BMI (Body Mass Index)       79 97.4  F (36.3  C) (Oral) 5' 3\" (1.6 m) 100% 28.34 kg/m2        Blood Pressure from Last 3 Encounters:   08/01/17 136/78   07/26/17 137/78   07/21/17 145/74    Weight from Last 3 Encounters:   08/01/17 159 lb 6.4 oz (72.3 kg)   07/26/17 160 lb (72.6 kg)   07/21/17 159 lb (72.1 kg)              We Performed the Following     Albumin Random Urine Quantitative     Calprotectin Feces     Creatinine          Today's Medication Changes          These changes are accurate as of: 7/26/17 11:59 PM.  If you have any questions, ask your nurse or doctor.               Start taking these medicines.        Dose/Directions    cholestyramine 4 G Packet   Commonly known as:  QUESTRAN   Used for:  Acute diarrhea   Started by:  Tre Lockett MD        Dose:  1 packet   Take 1 packet (4 g) by mouth 2 times daily (with meals)   Quantity:  60 each   Refills:  5       ranitidine 150 MG tablet   Commonly known as:  ZANTAC   Used for:  Gastroesophageal reflux disease without esophagitis   Started by:  Tre Lockett MD        Dose:  150 mg   Take 1 tablet (150 mg) by mouth 2 times daily   Quantity:  60 tablet   Refills:  5         Stop taking these medicines if you haven't already. Please contact your care team if you have questions.     omeprazole 20 MG CR capsule   Commonly known as:  priLOSEC   Stopped by:  Tre Lockett MD                Where to get your medicines      [place] [facility] [place] [address]  [address], [facility] [address]     Phone:  [phone]     cholestyramine 4 G Packet    ranitidine 150 MG tablet                Primary Care Provider Office " Phone # Fax #    Tre Lockett [phone]       [facility] [address]  [facility] [address]        Equal Access to Services     [facility] : Hadii rakesh ku hadmyrao Soomaali, waaxda luqadaha, qaybta kaalmada adeegyada, waxay chazin hayaan kimberlynicolas kamara ajay skinner. So [facility] [phone].    ATENCIÓN: Si habla español, tiene a merchant disposición servicios gratuitos de asistencia lingüística. Llame al [phone].    We comply with applicable federal civil rights laws and [place] laws. We do not discriminate on the basis of race, color, national origin, age, disability sex, sexual orientation or gender identity.            Thank you!     Thank you for choosing [facility]  for your care. Our goal is always to provide you with excellent care. Hearing back from our patients is one way we can continue to improve our services. Please take a few minutes to complete the written survey that you may receive in the mail after your visit with us. Thank you!             Your Updated Medication List - Protect others around you: Learn how to safely use, store and throw away your medicines at www.disposemymeds.org.          This list is accurate as of: 7/26/17 11:59 PM.  Always use your most recent med list.                   Brand Name Dispense Instructions for use Diagnosis    apixaban ANTICOAGULANT 2.5 MG tablet    ELIQUIS    60 tablet    Take 1 tablet (2.5 mg) by mouth 2 times daily    H/O atrial flutter       bismuth subsalicylate 262 MG Tabs     80 tablet    Take 1 tablet by mouth every 4 hours as needed    Acute infectious diarrhea, Enteritis due to Norovirus       Blood Pressure Monitor Kit     1 kit    1 kit daily as needed    CHF (congestive heart failure) (H)       calcium-vitamin D 600-400 MG-UNIT per tablet    CALTRATE     Take 1 tablet by mouth 2 times daily        Carboxymethylcellulose Sodium 1 % Gel      1-2 drops (aka Genteal)        cholestyramine 4  G Packet    QUESTRAN    60 each    Take 1 packet (4 g) by mouth 2 times daily (with meals)    Acute diarrhea       ciprofloxacin 250 MG tablet    CIPRO    90 tablet    Take 1 tablet (250 mg) by mouth daily    Chronic UTI       Cranberry 180 MG Caps      Take 1 capsule by mouth daily Unsure of strength        fish oil-omega-3 fatty acids 1000 MG capsule      Take 2 g by mouth daily. 2 capsules daily        FLAGYL PO      Take 500 mg by mouth 2 times daily    Acute infectious diarrhea       folic acid 1 MG tablet    FOLVITE    100 tablet    Take 1 tablet (1 mg) by mouth daily    Oral aphthae       GENTEAL MILD OP      Apply  to eye daily.        IBANdronate 150 MG tablet    BONIVA    1 tablet    Take 1 tablet (150 mg) by mouth every 30 days Take 60 minutes before am meal with 8 oz. water. Remain upright for 30 minutes.    Osteopenia       levothyroxine 75 MCG tablet    SYNTHROID/LEVOTHROID    90 tablet    TAKE ONE TABLET BY MOUTH EVERY DAY    Hypothyroidism, unspecified type       losartan 25 MG tablet    COZAAR    180 tablet    Take 2 tablets (50 mg) by mouth daily (with dinner) Hold if SBP less than 110.    Hypertension goal BP (blood pressure) < 140/90       LUCENTIS IO      1 injection every 4 Weeks        metoprolol 25 MG 24 hr tablet    TOPROL-XL    45 tablet    Take 0.5 tablets (12.5 mg) by mouth daily    Paroxysmal atrial flutter (H)       nitroGLYcerin 0.4 MG sublingual tablet    NITROSTAT    25 tablet    Place 1 tablet (0.4 mg) under the tongue every 5 minutes as needed for chest pain    Chest pain       * order for DME     1 Box    Equipment being ordered: Dispense face mask. Mrs. Spicer is immunosuppressed due to rheumatoid arthritis.    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 Use with Albuterol.    Hypoxia       order for DME     1 each    Equipment being ordered: Dispense baffle, for use with  nebulizer.    Pneumonia of left upper lobe due to Mycoplasma pneumoniae       * order for DME     1 each    Equipment being ordered: Left wrist splint.    Injury of left hand, initial encounter       * order for DME     1 each    Equipment being ordered: Left wrist splint.    Left wrist injury, initial encounter       PRESERVISION AREDS PO      Take 2 tablets by mouth daily        ranitidine 150 MG tablet    ZANTAC    60 tablet    Take 1 tablet (150 mg) by mouth 2 times daily    Gastroesophageal reflux disease without esophagitis       REFRESH P.M. Oint      Apply  to eye. Daily at bedtime        saccharomyces boulardii 250 MG capsule    FLORASTOR     Take 250 mg by mouth        senna-docusate 8.6-50 MG per tablet    SENOKOT-S;PERICOLACE    120 tablet    Take 2 tablets by mouth At Bedtime Hold if diarrhea occurs.    Constipation, chronic       SPIRONOLACTONE PO      Take 25 mg by mouth every morning Hold if SBP is less than 120.    Congestive heart failure with preserved LV function, NYHA class 3 (H)       triamcinolone 0.1 % cream    KENALOG    453.6 g    Apply sparingly to affected area on face three times daily.    Facial lesion       ZYRTEC ALLERGY 10 MG tablet   Generic drug:  cetirizine      Take 10 mg by mouth At Bedtime        * Notice:  This list has 4 medication(s) that are the same as other medications prescribed for you. Read the directions carefully, and ask your doctor or other care provider to review them with you.

## 2017-07-26 NOTE — PROGRESS NOTES
SUBJECTIVE:                                                    iLnda Spicer is a 86 year old female who presents to clinic today for the following health issues:      Acute diarrhea   Onset: 2-3 weeks    Fever: no    Chills/Sweats: no    Headache (location?): no    Sinus Pressure:no    Conjunctivitis:  no    Ear Pain: no    Rhinorrhea: no    Congestion: no    Sore Throat: no    Abdominal distention: YES     Cough: no    Wheeze: no    Decreased Appetite: YES    Nausea: YES- bloated also    Vomiting: no    Diarrhea:  YES- loose    Dysuria/Freq.: YES unsure    Fatigue/Achiness: YES    Sick/Strep Exposure: no     Therapies Tried and outcome: azo, little relief        Patient Active Problem List   Diagnosis     ACP (advance care planning)     Hypertension goal BP (blood pressure) < 150/90     Hypothyroid     Diffuse idiopathic pulmonary fibrosis (H)     Macular degeneration, left eye     Irritable bowel syndrome     Encounter for palliative care     Adjustment disorder with anxious mood     Mild anemia     DDD (degenerative disc disease), lumbar     CKD (chronic kidney disease) stage 3, GFR 30-59 ml/min     History of blood transfusion     Aftercare following surgery     S/P lumbar laminectomy     High risk medication use     Osteopenia     Atrophic vaginitis     Fecal incontinence     Female stress incontinence     Impingement syndrome of both shoulders     UIP (usual interstitial pneumonitis) (H)     High risk medications (not anticoagulants) long-term use     Heart failure with preserved ejection fraction (H)     Congestive heart failure with preserved LV function, NYHA class 3 (H)     Other specified hypothyroidism     Rheumatoid arthritis involving multiple sites with positive rheumatoid factor (H)     Health Care Home     Sick sinus syndrome (H)     Cardiac pacemaker - Medtronic dual lead pacemaker - Not Dependent - MRI Safe     Past Surgical History:   Procedure Laterality Date     APPENDECTOMY       BIOPSY       hemorrhoidectomy     ENT SURGERY      tonsillectomy     GYN SURGERY      3 D & C's     HYSTERECTOMY, PAP NO LONGER INDICATED       LAMINECTOMY LUMBAR ONE LEVEL N/A 10/13/2015    Procedure: LAMINECTOMY LUMBAR ONE LEVEL;  Surgeon: Fransico Toussaint MD;  Location:  OR       Social History   Substance Use Topics     Smoking status: Never Smoker     Smokeless tobacco: Never Used     Alcohol use Yes      Comment: rare wine      Family History   Problem Relation Age of Onset     Hypertension Mother      Psychotic Disorder Father      DIABETES Son      DIABETES Daughter      Blood Disease Daughter          Allergies   Allergen Reactions     Cephalexin Hcl Diarrhea     Gabapentin Other (See Comments)     Dizzsiness     Naproxen GI Disturbance     Perfume      Lactase Other (See Comments)     Macrobid [Nitrofurantoin Anhydrous]      Possibly related to lung disease      Sulfa Drugs      Throat swelling     Xanax [Alprazolam] Other (See Comments)     Dizziness      Recent Labs   Lab Test  07/26/17   1144  07/21/17   0859  06/27/17   0852  06/12/17   0931   03/24/17   1401  02/16/17   0949  12/22/16   1506   06/03/16   0756   05/22/14   0821   LDL   --    --    --    --    --    --    --    --    --   109*   --   97   HDL   --    --    --    --    --    --    --    --    --   47*   --   42*   TRIG   --    --    --    --    --    --    --    --    --   75   --   84   ALT   --   31  26  34   < >  36  21  31   < >  36   < >   --    CR  1.32*  1.24*  1.19*  1.21*   < >  1.31*  1.24*  1.22*   < >  1.44*   < >  1.21*   GFRESTIMATED  38*  41*  43*  42*   < >  39*  41*  42*   < >  35*   < >  43*   GFRESTBLACK  46*  50*  52*  51*   < >  47*  50*  51*   < >  42*   < >  52*   POTASSIUM   --    --    --    --    --   3.7  4.7  4.8   < >  5.1   < >  4.1   TSH   --    --    --    --    --    --   0.93  0.83   < >   --    < >  0.58    < > = values in this interval not displayed.      BP Readings from Last 3 Encounters:   08/01/17  "136/78   07/26/17 137/78   07/21/17 145/74    Wt Readings from Last 3 Encounters:   08/01/17 159 lb 6.4 oz (72.3 kg)   07/26/17 160 lb (72.6 kg)   07/21/17 159 lb (72.1 kg)               ROS:  C: NEGATIVE for fever, chills, change in weight  I: NEGATIVE for worrisome rashes, moles or lesions  E: NEGATIVE for vision changes or irritation  E/M: NEGATIVE for ear, mouth and throat problems  R: NEGATIVE for significant cough or SOB  CV: NEGATIVE for chest pain, palpitations or peripheral edema  GI: As above.  : NEGATIVE for frequency, dysuria, or hematuria  M: NEGATIVE for significant arthralgias or myalgia  N: NEGATIVE for weakness, dizziness or paresthesias  E: NEGATIVE for temperature intolerance, skin/hair changes  H: NEGATIVE for bleeding problems  P: NEGATIVE for changes in mood or affect    OBJECTIVE:     /78 (BP Location: Left arm, Patient Position: Chair, Cuff Size: Adult Regular)  Pulse 79  Temp 97.4  F (36.3  C) (Oral)  Ht 5' 3\" (1.6 m)  Wt 160 lb (72.6 kg)  SpO2 100%  BMI 28.34 kg/m2  Body mass index is 28.34 kg/(m^2).  GENERAL: healthy, in mild distress, elderly and fatigued  EYES: Eyes grossly normal to inspection and conjunctivae and sclerae normal  HENT: normal cephalic/atraumatic and oral mucous membranes moist  NECK: no adenopathy and thyroid normal to palpation  RESP: lungs clear to auscultation - no rales, rhonchi or wheezes  CV: regular rate and rhythm, normal S1 S2, no S3 or S4, no murmur, click or rub, no peripheral edema and peripheral pulses strong  ABDOMEN: soft, nontender, no hepatosplenomegaly, no masses and bowel sounds normal  MS: no gross musculoskeletal defects noted, no edema  SKIN: no suspicious lesions or rashes  NEURO: Normal strength and tone, mentation intact and speech normal  PSYCH: mentation appears normal, affect normal/bright    Diagnostic Test Results:  Results for orders placed or performed in visit on 07/26/17   Calprotectin Feces   Result Value Ref Range    " Calprotectin Feces <27.0  Normal   0.0 - 49.9 mg/kg   Creatinine   Result Value Ref Range    Creatinine 1.32 (H) 0.52 - 1.04 mg/dL    GFR Estimate 38 (L) >60 mL/min/1.7m2    GFR Estimate If Black 46 (L) >60 mL/min/1.7m2   Albumin Random Urine Quantitative   Result Value Ref Range    Creatinine Urine 42 mg/dL    Albumin Urine mg/L 20 mg/L    Albumin Urine mg/g Cr 48.20 (H) 0 - 25 mg/g Cr       ASSESSMENT/PLAN:       (R19.7) Acute diarrhea  (primary encounter diagnosis)  Comment:   Plan: Calprotectin Feces, Enteric Bacteria and Virus         Panel by RUPA Stool, Clostridium difficile Toxin        B PCR, cholestyramine (QUESTRAN) 4 G Packet            (R14.0) Abdominal distention  Comment:   Plan: XR KUB            (N18.3) CKD (chronic kidney disease) stage 3, GFR 30-59 ml/min  Comment:   Plan: Creatinine, Albumin Random Urine Quantitative            (K21.9) Gastroesophageal reflux disease without esophagitis  Comment:   Plan: ranitidine (ZANTAC) 150 MG tablet          Follow-up visit if condition worsens.    Tre Lockett MD  [facility]

## 2017-07-27 LAB
CAMPYLOBACTER GROUP BY NAT: NOT DETECTED
ENTERIC PATHOGEN COMMENT: NORMAL
NOROVIRUS I AND II BY NAT: NOT DETECTED
ROTAVIRUS A BY NAT: NOT DETECTED
SALMONELLA SPECIES BY NAT: NOT DETECTED
SHIGA TOXIN 1 GENE BY NAT: NOT DETECTED
SHIGA TOXIN 2 GENE BY NAT: NOT DETECTED
SHIGELLA SP+EIEC IPAH STL QL NAA+PROBE: NOT DETECTED
VIBRIO GROUP BY NAT: NOT DETECTED
YERSINIA ENTEROCOLITICA BY NAT: NOT DETECTED

## 2017-07-28 LAB — CALPROTECTIN STL-MCNT: NORMAL MG/KG (ref 0–49.9)

## 2017-07-30 RX ORDER — CHOLESTYRAMINE 4 G/9G
1 POWDER, FOR SUSPENSION ORAL 2 TIMES DAILY WITH MEALS
Qty: 60 EACH | Refills: 5 | Status: SHIPPED | OUTPATIENT
Start: 2017-07-30 | End: 2017-12-07

## 2017-08-01 ENCOUNTER — OFFICE VISIT (OUTPATIENT)
Dept: RHEUMATOLOGY | Facility: CLINIC | Age: 82
End: 2017-08-01
Payer: MEDICARE

## 2017-08-01 VITALS
HEART RATE: 62 BPM | OXYGEN SATURATION: 98 % | WEIGHT: 159.4 LBS | TEMPERATURE: 97.2 F | BODY MASS INDEX: 28.24 KG/M2 | HEIGHT: 63 IN | SYSTOLIC BLOOD PRESSURE: 136 MMHG | DIASTOLIC BLOOD PRESSURE: 78 MMHG

## 2017-08-01 DIAGNOSIS — A09 ACUTE INFECTIOUS DIARRHEA: ICD-10-CM

## 2017-08-01 DIAGNOSIS — Z79.899 HIGH RISK MEDICATION USE: ICD-10-CM

## 2017-08-01 DIAGNOSIS — M05.79 RHEUMATOID ARTHRITIS INVOLVING MULTIPLE SITES WITH POSITIVE RHEUMATOID FACTOR (H): Primary | ICD-10-CM

## 2017-08-01 PROCEDURE — 99213 OFFICE O/P EST LOW 20 MIN: CPT | Performed by: INTERNAL MEDICINE

## 2017-08-01 RX ORDER — AZATHIOPRINE 50 MG/1
50 TABLET ORAL DAILY
Qty: 90 TABLET | Refills: 1 | Status: SHIPPED | OUTPATIENT
Start: 2017-08-01 | End: 2017-11-07

## 2017-08-01 NOTE — MR AVS SNAPSHOT
After Visit Summary   8/1/2017    iLnda Spicer    MRN: [id number]           Patient Information     Date Of Birth          6/13/1931        Visit Information        Provider Department      8/1/2017 10:20 AM Mario Davenport MD [facility]        Today's Diagnoses     Rheumatoid arthritis involving multiple sites with positive rheumatoid factor (H)        High risk medication use           Follow-ups after your visit        Your next 10 appointments already scheduled     Aug 18, 2017  9:30 AM CDT   Level O with BAY 9 INFUSION   [facility])    [id number] [place] [id number]   [phone]            Sep 11, 2017  1:10 PM CDT   Return Visit with Emeterio Loza MD   [facility])    [id number] [place] [address]   [phone]            Sep 15, 2017  9:30 AM CDT   Level O with BAY 1 INFUSION   [facility])    [address] [place] [address]   [phone]            Oct 04, 2017  4:30 PM CDT   Return Visit with DEVICE CHECK RN CARD   [facility])    [address] [place] [address]   [phone]            Nov 07, 2017  9:20 AM CST   [facility] Visit with Mario Davenport MD   [facility]    [address] [facility] [id number] [phone]              Who to contact     If you have questions or need follow up information about today's clinic visit or your schedule please contact [facility] directly at [phone].  Normal or non-critical lab and imaging results will be communicated to you by MyChart, letter or phone within 4 business days after the clinic has received the results. If you do not hear from us within 7 days, please contact the clinic  "through [facility] or phone. If you have a critical or abnormal lab result, we will notify you by phone as soon as possible.  Submit refill requests through [employer] or call your pharmacy and they will forward the refill request to us. Please allow 3 business days for your refill to be completed.          Additional Information About Your Visit        [facility] Information     [employer] gives you secure access to your electronic health record. If you see a primary care provider, you can also send messages to your care team and make appointments. If you have questions, please call your primary care clinic.  If you do not have a primary care provider, please call [phone] and they will assist you.        Care EveryWhere ID     This is your Care EveryWhere ID. This could be used by other organizations to access your [place] medical records  [id number]        Your Vitals Were     Pulse Temperature Height Pulse Oximetry BMI (Body Mass Index)       62 97.2  F (36.2  C) (Oral) 1.6 m (5' 3\") 98% 28.24 kg/m2        Blood Pressure from Last 3 Encounters:   08/01/17 136/78   07/26/17 137/78   07/21/17 145/74    Weight from Last 3 Encounters:   08/01/17 72.3 kg (159 lb 6.4 oz)   07/26/17 72.6 kg (160 lb)   07/21/17 72.1 kg (159 lb)              Today, you had the following     No orders found for display       Primary Care Provider Office Phone # Fax #    Tre Lockett [phone]       [facility] [address]  [facility] [address]        Equal Access to Services     [facility]: Hadii aad ku hadasho Soomaali, waaxda luqadaha, qaybta kaalmada ana, zahraa moss . So [facility] [phone].    ATENCIÓN: Si habla español, tiene a merchant disposición servicios gratuitos de asistencia lingüística. Llame al [phone].    We comply with applicable federal civil rights laws and [place] laws. We do not discriminate on the basis of race, color, national " origin, age, disability sex, sexual orientation or gender identity.            Thank you!     Thank you for choosing [facility]  for your care. Our goal is always to provide you with excellent care. Hearing back from our patients is one way we can continue to improve our services. Please take a few minutes to complete the written survey that you may receive in the mail after your visit with us. Thank you!             Your Updated Medication List - Protect others around you: Learn how to safely use, store and throw away your medicines at www.disposemymeds.org.          This list is accurate as of: 8/1/17 11:08 AM.  Always use your most recent med list.                   Brand Name Dispense Instructions for use Diagnosis    apixaban ANTICOAGULANT 2.5 MG tablet    ELIQUIS    60 tablet    Take 1 tablet (2.5 mg) by mouth 2 times daily    H/O atrial flutter       azaTHIOprine 50 MG tablet    IMURAN    30 tablet    Take 1 tablet (50 mg) by mouth daily    Rheumatoid arthritis involving multiple sites with positive rheumatoid factor (H), High risk medication use       bismuth subsalicylate 262 MG Tabs     80 tablet    Take 1 tablet by mouth every 4 hours as needed    Acute infectious diarrhea, Enteritis due to Norovirus       Blood Pressure Monitor Kit     1 kit    1 kit daily as needed    CHF (congestive heart failure) (H)       calcium-vitamin D 600-400 MG-UNIT per tablet    CALTRATE     Take 1 tablet by mouth 2 times daily        Carboxymethylcellulose Sodium 1 % Gel      1-2 drops (aka Genteal)        cholestyramine 4 G Packet    QUESTRAN    60 each    Take 1 packet (4 g) by mouth 2 times daily (with meals)    Acute diarrhea       ciprofloxacin 250 MG tablet    CIPRO    90 tablet    Take 1 tablet (250 mg) by mouth daily    Chronic UTI       Cranberry 180 MG Caps      Take 1 capsule by mouth daily Unsure of strength        fish oil-omega-3 fatty acids 1000 MG capsule      Take 2 g by mouth daily. 2  capsules daily        FLAGYL PO      Take 500 mg by mouth 2 times daily    Acute infectious diarrhea       folic acid 1 MG tablet    FOLVITE    100 tablet    Take 1 tablet (1 mg) by mouth daily    Oral aphthae       GENTEAL MILD OP      Apply  to eye daily.        IBANdronate 150 MG tablet    BONIVA    1 tablet    Take 1 tablet (150 mg) by mouth every 30 days Take 60 minutes before am meal with 8 oz. water. Remain upright for 30 minutes.    Osteopenia       levothyroxine 75 MCG tablet    SYNTHROID/LEVOTHROID    90 tablet    TAKE ONE TABLET BY MOUTH EVERY DAY    Hypothyroidism, unspecified type       losartan 25 MG tablet    COZAAR    180 tablet    Take 2 tablets (50 mg) by mouth daily (with dinner) Hold if SBP less than 110.    Hypertension goal BP (blood pressure) < 140/90       LUCENTIS IO      1 injection every 4 Weeks        metoprolol 25 MG 24 hr tablet    TOPROL-XL    45 tablet    Take 0.5 tablets (12.5 mg) by mouth daily    Paroxysmal atrial flutter (H)       nitroGLYcerin 0.4 MG sublingual tablet    NITROSTAT    25 tablet    Place 1 tablet (0.4 mg) under the tongue every 5 minutes as needed for chest pain    Chest pain       * order for DME     1 Box    Equipment being ordered: Dispense face mask. Mrs. Spicer is immunosuppressed due to rheumatoid arthritis.    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 Use with Albuterol.    Hypoxia       order for DME     1 each    Equipment being ordered: Dispense baffle, for use with nebulizer.    Pneumonia of left upper lobe due to Mycoplasma pneumoniae       * order for DME     1 each    Equipment being ordered: Left wrist splint.    Injury of left hand, initial encounter       * order for DME     1 each    Equipment being ordered: Left wrist splint.    Left wrist injury, initial encounter       PRESERVISION AREDS PO      Take 2 tablets by mouth daily         ranitidine 150 MG tablet    ZANTAC    60 tablet    Take 1 tablet (150 mg) by mouth 2 times daily    Gastroesophageal reflux disease without esophagitis       REFRESH P.M. Oint      Apply  to eye. Daily at bedtime        saccharomyces boulardii 250 MG capsule    FLORASTOR     Take 250 mg by mouth        senna-docusate 8.6-50 MG per tablet    SENOKOT-S;PERICOLACE    120 tablet    Take 2 tablets by mouth At Bedtime Hold if diarrhea occurs.    Constipation, chronic       SPIRONOLACTONE PO      Take 25 mg by mouth every morning Hold if SBP is less than 120.    Congestive heart failure with preserved LV function, NYHA class 3 (H)       triamcinolone 0.1 % cream    KENALOG    453.6 g    Apply sparingly to affected area on face three times daily.    Facial lesion       ZYRTEC ALLERGY 10 MG tablet   Generic drug:  cetirizine      Take 10 mg by mouth At Bedtime        * Notice:  This list has 4 medication(s) that are the same as other medications prescribed for you. Read the directions carefully, and ask your doctor or other care provider to review them with you.

## 2017-08-01 NOTE — NURSING NOTE
"Chief Complaint   Patient presents with     Arthritis     RA, patient states she is doing good, right knee is still a little painful       Initial /69 (BP Location: Left arm, Patient Position: Chair, Cuff Size: Adult Regular)  Pulse 62  Temp 97.2  F (36.2  C) (Oral)  Ht 1.6 m (5' 3\")  Wt 72.3 kg (159 lb 6.4 oz)  SpO2 98%  BMI 28.24 kg/m2 Estimated body mass index is 28.24 kg/(m^2) as calculated from the following:    Height as of this encounter: 1.6 m (5' 3\").    Weight as of this encounter: 72.3 kg (159 lb 6.4 oz).  BP completed using cuff size: regular         RAPID3 (0-30) Cumulative Score            RAPID3 Weighted Score (divide #4 by 3 and that is the weighted score)           "

## 2017-08-01 NOTE — PROGRESS NOTES
"Rheumatology Clinic Visit      Linda Spicer MRN# [id number]   YOB: 1931 Age: 86 year old      Date of visit: 8/01/17   PCP: Dr. Tre Lockett  Pulmonology: Dr. Sandra Comer  Cardiology: Dr. Emeterio Loza  Dermatology: Dr. Andrews Knott at [facility] in [place]     Chief Complaint   Patient presents with:  Arthritis: RA, patient states she is doing good, right knee is still a little painful      Assessment and Plan     1. Seropositive Nonerosive Rheumatoid Arthritis (RF 99, CCP 52): Previously treated with hydroxychloroquine 200 mg daily (stopped previously because of macular degeneration), methotrexate (leg cramps), Cimzia (stopped due to recurrent basal cell carcinoma and hx of heart failure). Has sulfa allergy. Leflunomide avoided because of ILD.  Currently on Orencia IV and azathioprine 50mg daily. Doing well today and therefore will maintain the current medication regimen.  TPMT 23.2 (normal 24-44) and considered \"intermediate activity\".  - Continue orencia 750mg IV, administered  at the [facility]  - Continue azathioprine 50mg daily  - Labs every 3 months to be done with her Orencia infusions (every third infusion): CBC, Creatinine, Hepatic Panel, ESR, CRP    2. Bilateral shoulder impingement syndrome, history: Maintaining ROM with exercises at home. She was previously referred to PT but did not go. Not an issue today.     3. Macular degeneration: listed here because of clinical significance; not managed in this clinic    4. History of basal cell carcinoma: Reportedly with one lesion removed in 2007 and recurrence in fall 2016.    5. Heart failure: She is followed by cardiology.  Has a pacemaker, per patient.     6. 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  2013 chest CT with contrast performed at [facility] documents UIP pattern. She was then seen by " Dr. Comer on 3/14/2017.  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     7. Hypertersion: Blood pressure checked this clinic visit and it was reasonable.     8. Bilateral pes anserine bursitis: Left knee pain has resolved. Right knee pain improved and is minimal at this time.     9. Bone Health: Managed by PCP already.     Ms. Spicer verbalized agreement with and understanding of the rational for the diagnosis and treatment plan.  All questions were answered to best of my ability and the patient's satisfaction. Ms. Spicer was advised to contact the clinic with any questions that may arise after the clinic visit.      Thank you for involving me in the care of the patient    Return to clinic: 3 months      HPI   Linda Spicer is a 86 year old female with a medical history significant for hypertension, heart failure, pulmonary fibrosis, audible bowel syndrome, degenerative disc disease of the lumbar spine status post laminectomy, chronic kidney disease, history of basal cell carcinoma, and rheumatoid arthritis who presents for follow-up of rheumatoid arthritis.    Today, she reports that her joints are doing great. She went to physical therapy for her right knee pain where they did ultrasound and stretching exercises that were very effective. She still has mild right knee pain but it is not inhibiting her daily activities. She also went to podiatry where she had orthotics made that have helped her immensely. She says that she is able to do all of her daily activities without any problems. She is having some loose stools, but also some constipation. She is being worked up for infectious diarrhea by Dr. Lockett.  She plans to get the collection cup for a stool sample today from the lab. She reports that she was doing better while taking Flagyl.     Denies fevers, chills, nausea, vomiting. No abdominal pain. No chest pain/pressure, palpitations.  Chronic SOB. No oral or nasal sores. No neck pain.  No LE swelling.  No photosensitivity or  photophobia.  No sicca symptoms.  No eye pain or redness.     Tobacco: None  EtOH: rare  Drugs: none  Used to live in [place], [place] part time.    ROS   GEN: No fevers, chills, night sweats, or weight change  SKIN: See HPI  HEENT: No epistaxis. No oral or nasal ulcers.  CV: No chest pain, pressure, palpitations  PULM: No wheeze, cough.  GI: No nausea, vomiting. No blood in stool. No abdominal pain.  : No blood in urine.  MSK: See HPI.  NEURO: No numbness, tingling, or weakness.  EXT: No LE swelling  PSYCH: Negative    Active Problem List     Patient Active Problem List   Diagnosis     ACP (advance care planning)     Hypertension goal BP (blood pressure) < 150/90     Hypothyroid     Diffuse idiopathic pulmonary fibrosis (H)     Macular degeneration, left eye     Irritable bowel syndrome     Encounter for palliative care     Adjustment disorder with anxious mood     Mild anemia     DDD (degenerative disc disease), lumbar     CKD (chronic kidney disease) stage 3, GFR 30-59 ml/min     History of blood transfusion     Aftercare following surgery     S/P lumbar laminectomy     High risk medication use     Osteopenia     Atrophic vaginitis     Fecal incontinence     Female stress incontinence     Impingement syndrome of both shoulders     UIP (usual interstitial pneumonitis) (H)     High risk medications (not anticoagulants) long-term use     Heart failure with preserved ejection fraction (H)     Congestive heart failure with preserved LV function, NYHA class 3 (H)     Other specified hypothyroidism     Rheumatoid arthritis involving multiple sites with positive rheumatoid factor (H)     Health Care Home     Sick sinus syndrome (H)     Cardiac pacemaker - Medtronic dual lead pacemaker - Not Dependent - MRI Safe     Past Medical History     Past Medical History:   Diagnosis Date     Adjustment disorder with anxious mood 5/18/2015     Advanced directives, counseling/discussion 8/30/2012    Patient states has Advance Directive  and will bring in a copy to clinic. 8/30/2012   Tevin May  [facility] Medical Assistant \       Anemia 9/25/2015     Basal cell cancer      CHF (congestive heart failure) (H) 9/18/2014     CKD (chronic kidney disease) stage 3, GFR 30-59 ml/min 9/29/2015     DDD (degenerative disc disease), lumbar 9/25/2015     Diffuse idiopathic pulmonary fibrosis (H) 5/6/2013     Encounter for palliative care 5/18/2015     History of blood transfusion 9/29/2015     Hypertension goal BP (blood pressure) < 140/90 9/7/2012     Hypothyroid 9/7/2012     Irritable bowel syndrome 10/29/2013     Macular degeneration      Macular degeneration, 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     Nondisplaced spiral fracture of shaft of humerus      Osteoporosis 8/13/2013     Imo Update utility     RA (rheumatoid arthritis) (H) 5/7/2013     Rheumatic fever      Shingles      Spinal stenosis of lumbar region with neurogenic claudication 9/14/2015     Past Surgical History     Past Surgical History:   Procedure Laterality Date     APPENDECTOMY       BIOPSY      hemorrhoidectomy     ENT SURGERY      tonsillectomy     GYN SURGERY      3 D & C's     HYSTERECTOMY, PAP NO LONGER INDICATED       LAMINECTOMY LUMBAR ONE LEVEL N/A 10/13/2015    Procedure: LAMINECTOMY LUMBAR ONE LEVEL;  Surgeon: Fransico Toussaint MD;  Location: [facility] OR     Allergy     Allergies   Allergen Reactions     Cephalexin Hcl Diarrhea     Gabapentin Other (See Comments)     Dizzsiness     Naproxen GI Disturbance     Perfume      Lactase Other (See Comments)     Macrobid [Nitrofurantoin Anhydrous]      Possibly related to lung disease      Sulfa Drugs      Throat swelling     Xanax [Alprazolam] Other (See Comments)     Dizziness      Current Medication List     Current Outpatient Prescriptions   Medication Sig     ranitidine (ZANTAC) 150 MG tablet Take 1 tablet (150 mg) by mouth 2 times daily     cholestyramine (QUESTRAN) 4 G Packet Take 1 packet (4 g) by mouth 2 times daily (with meals)     IBANdronate  (BONIVA) 150 MG tablet Take 1 tablet (150 mg) by mouth every 30 days Take 60 minutes before am meal with 8 oz. water. Remain upright for 30 minutes.     levothyroxine (SYNTHROID/LEVOTHROID) 75 MCG tablet TAKE ONE TABLET BY MOUTH EVERY DAY     folic acid (FOLVITE) 1 MG tablet Take 1 tablet (1 mg) by mouth daily     azaTHIOprine (IMURAN) 50 MG tablet Take 1 tablet (50 mg) by mouth daily     losartan (COZAAR) 25 MG tablet Take 2 tablets (50 mg) by mouth daily (with dinner) Hold if SBP less than 110.     bismuth subsalicylate 262 MG TABS Take 1 tablet by mouth every 4 hours as needed     metoprolol (TOPROL-XL) 25 MG 24 hr tablet Take 0.5 tablets (12.5 mg) by mouth daily     ciprofloxacin (CIPRO) 250 MG tablet Take 1 tablet (250 mg) by mouth daily     SPIRONOLACTONE PO Take 25 mg by mouth every morning Hold if SBP is less than 120.     apixaban ANTICOAGULANT (ELIQUIS) 2.5 MG tablet Take 1 tablet (2.5 mg) by mouth 2 times daily     senna-docusate (SENOKOT-S;PERICOLACE) 8.6-50 MG per tablet Take 2 tablets by mouth At Bedtime Hold if diarrhea occurs.     Cranberry 180 MG CAPS Take 1 capsule by mouth daily Unsure of strength     Carboxymethylcellulose Sodium 1 % GEL 1-2 drops (aka Genteal)     saccharomyces boulardii (FLORASTOR) 250 MG capsule Take 250 mg by mouth     triamcinolone (KENALOG) 0.1 % cream Apply sparingly to affected area on face three times daily.     Multiple Vitamins-Minerals (PRESERVISION AREDS PO) Take 2 tablets by mouth daily     cetirizine (ZYRTEC ALLERGY) 10 MG tablet Take 10 mg by mouth At Bedtime     Hypromellose (GENTEAL MILD OP) Apply  to eye daily.     Artificial Tear Ointment (REFRESH P.M.) OINT Apply  to eye. Daily at bedtime        calcium-vitamin D (CALTRATE) 600-400 MG-UNIT per tablet Take 1 tablet by mouth 2 times daily      fish oil-omega-3 fatty acids (FISH OIL) 1000 MG capsule Take 2 g by mouth daily. 2 capsules daily          MetroNIDAZOLE (FLAGYL PO) Take 500 mg by mouth 2 times daily   "    order for DME Equipment being ordered: Left wrist splint.     order for DME Equipment being ordered: Left wrist splint.     order for DME Equipment being ordered: Dispense baffle, for use with nebulizer.     order for DME Equipment being ordered: Nebulizer. Use with Albuterol.     order for DME Equipment being ordered: Dispense face mask.  Mrs. Spicer is immunosuppressed due to rheumatoid arthritis.     Blood Pressure Monitor KIT 1 kit daily as needed     nitroglycerin (NITROSTAT) 0.4 MG SL tablet Place 1 tablet (0.4 mg) under the tongue every 5 minutes as needed for chest pain     Ranibizumab (LUCENTIS IO) 1 injection every 4 Weeks     No current facility-administered medications for this visit.      Facility-Administered Medications Ordered in Other Visits   Medication     DOBUTamine 500 mg in dextrose 5% 250 mL (adult std)     DOBUTamine 500 mg in dextrose 5% 250 mL (adult std)       Social History   See HPI    Family History     Family History   Problem Relation Age of Onset     Hypertension Mother      Psychotic Disorder Father      DIABETES Son      DIABETES Daughter      Blood Disease Daughter      Physical Exam     Temp Readings from Last 3 Encounters:   08/01/17 97.2  F (36.2  C) (Oral)   07/26/17 97.4  F (36.3  C) (Oral)   07/21/17 97.8  F (36.6  C) (Oral)     BP Readings from Last 5 Encounters:   08/01/17 136/78   07/26/17 137/78   07/21/17 145/74   06/23/17 136/74   06/15/17 146/64     Pulse Readings from Last 1 Encounters:   08/01/17 62     Resp Readings from Last 1 Encounters:   07/21/17 20     Estimated body mass index is 28.24 kg/(m^2) as calculated from the following:    Height as of this encounter: 1.6 m (5' 3\").    Weight as of this encounter: 72.3 kg (159 lb 6.4 oz).    GEN: NAD  HEENT: MMM. No oral lesions. Anicteric, noninjected sclera  CV: S1, S2. RRR. No m/r/g.  PULM: CTA bilaterally. No w/c.   MSK: Hands, wrists, elbows, and shoulders without swelling or tenderness to palpation. Hips " nontender to direct palpation. Right knee mildly tender over the pes anserine bursa but no increased warmth or overlying erythema. Left knee without tenderness to palpation or swelling. Ankles and feet without swelling or tenderness to palpation.  Negative MCP and MTP squeeze bilaterally.   NEURO: UE and LE strengths 5/5 and equal bilaterally.   SKIN: No rash  EXT: No LE edema  PSYCH: Alert. Appropriate.    Labs / Imaging (select studies)   RF/CCP  Recent Labs   Lab Test  04/05/16   1036   CCPIGG  52*   RHF  99*     CBC  Recent Labs   Lab Test  07/21/17   0859  06/27/17   0852  06/12/17   0931   WBC  5.7  7.2  6.1   RBC  3.69*  3.59*  3.60*   HGB  11.9  11.5*  11.6*   HCT  35.3  35.1  35.4   MCV  96  98  98   RDW  13.7  13.6  13.3   PLT  246  257  243   MCH  32.2  32.0  32.2   MCHC  33.7  32.8  32.8   NEUTROPHIL  62.9  62.5  62.7   LYMPH  23.6  25.0  27.3   MONOCYTE  11.3  10.2  7.4   EOSINOPHIL  1.8  1.9  2.3   BASOPHIL  0.4  0.4  0.3   ANEU  3.6  4.5  3.8   ALYM  1.3  1.8  1.7   YEHUDA  0.6  0.7  0.5   AEOS  0.1  0.1  0.1   ABAS  0.0  0.0  0.0     CMP  Recent Labs   Lab Test  07/26/17   1144  07/21/17   0859  06/27/17   0852  06/12/17   0931  05/11/17   0931  03/24/17   1401  02/16/17   0949  12/22/16   1506   08/18/16   1002  07/28/16   0951  06/03/16   0756   NA   --    --    --    --    --   138  138  139   --   140  139  139   POTASSIUM   --    --    --    --    --   3.7  4.7  4.8   --   4.4  4.4  5.1   CHLORIDE   --    --    --    --    --   104  103  105   --   108  107  109   CO2   --    --    --    --    --   23  27  26   --   24  24  23   ANIONGAP   --    --    --    --    --   11  8  8   --   8  8  7   GLC   --    --    --    --    --   96  89  146*   --   117*  82  88   BUN   --    --    --    --    --   34*  30  37*   --   33*  42*  35*   CR  1.32*  1.24*  1.19*  1.21*  1.18*  1.31*  1.24*  1.22*   < >  1.22*  1.27*  1.44*   GFRESTIMATED  38*  41*  43*  42*  43*  39*  41*  42*   < >  42*  40*  35*    GFRESTBLACK  46*  50*  52*  51*  53*  47*  50*  51*   < >  51*  48*  42*   FATOUMATA   --    --    --    --    --   8.7  8.5  9.2   --   9.1  9.7  8.9   BILITOTAL   --   0.4  0.4  0.5  0.4  0.5  0.3  0.4   < >   --    --   0.4   ALBUMIN   --   3.5  3.4  3.5  3.4  3.5  3.6  3.9   < >   --    --   3.5   PROTTOTAL   --   7.3  7.2  7.3  7.1  7.3  7.4  8.1   < >   --    --   7.3   ALKPHOS   --   63  64  62  70  62  62  66   < >   --    --   67   AST   --   28  19  28  23  34  21  22   < >   --    --   21   ALT   --   31  26  34  29  36  21  31   < >   --    --   36    < > = values in this interval not displayed.     Calcium/VitaminD  Recent Labs   Lab Test  03/24/17   1401  02/16/17   0949  12/22/16   1506   FATOUMATA  8.7  8.5  9.2     ESR/CRP  Recent Labs   Lab Test  05/11/17   0931  02/16/17   0949  04/19/16   1800  04/05/16   1036   SED  27   --    --   28   CRP  <2.9  4.5  <2.9   --      TSH/T4  Recent Labs   Lab Test  02/16/17   0949  12/22/16   1506  08/01/16   0845  04/08/15   1148   TSH  0.93  0.83  0.64  0.60   T4   --    --   1.19  1.32     Lipid Panel  Recent Labs   Lab Test  06/03/16   0756  05/22/14   0821   CHOL  171  155   TRIG  75  84   HDL  47*  42*   LDL  109*  97   VLDL   --   17   CHOLHDLRATIO   --   3.7   NHDL  124   --      Hepatitis B  Recent Labs   Lab Test  04/05/16   1036   HBCAB  Nonreactive   HEPBANG  Nonreactive     Hepatitis C  Recent Labs   Lab Test  04/05/16   1036   HCVAB  Nonreactive   Assay performance characteristics have not been established for newborns,   infants, and children       Tuberculosis Screening  Recent Labs   Lab Test  02/21/17   1148  04/05/16   1037   TBRSLT  Negative  Negative   TBAGN  0.19  0.03     HIV Screening  Recent Labs   Lab Test  04/05/16   1036   HIAGAB  Nonreactive   HIV-1 p24 Ag & HIV-1/HIV-2 Ab Not Detected       Immunization History     Immunization History   Administered Date(s) Administered     Influenza (High Dose) 3 valent vaccine 09/07/2012, 10/08/2013,  09/26/2014, 10/09/2015, 09/21/2016     Influenza (IIV3) 08/29/2011     Pneumococcal (PCV 13) 04/05/2016     Pneumococcal 23 valent 05/01/2001, 10/04/2010     TD (ADULT, 7+) 07/01/2008          Chart documentation done in part with Dragon Voice recognition Software. Although reviewed after completion, some word and grammatical error may remain.    Mario Davenport MD

## 2017-08-04 ENCOUNTER — MYC REFILL (OUTPATIENT)
Dept: FAMILY MEDICINE | Facility: CLINIC | Age: 82
End: 2017-08-04

## 2017-08-04 DIAGNOSIS — K12.0 ORAL APHTHAE: ICD-10-CM

## 2017-08-04 RX ORDER — FOLIC ACID 1 MG/1
1 TABLET ORAL DAILY
Qty: 100 TABLET | Refills: 1 | Status: CANCELLED | OUTPATIENT
Start: 2017-08-04

## 2017-08-04 NOTE — TELEPHONE ENCOUNTER
Message from MyChart:  Original authorizing provider: MD Linda Michael would like a refill of the following medications:  folic acid (FOLVITE) 1 MG tablet [Tre Lockett MD]    Preferred pharmacy: [facility] [place] - [address]    Comment:

## 2017-08-08 NOTE — TELEPHONE ENCOUNTER
Confirmed with pharmacy they have refill, he was not sure how it got sent.    Shala Martínez, MSN, RN-BC  Care Coordinator

## 2017-08-14 ENCOUNTER — CARE COORDINATION (OUTPATIENT)
Dept: CARE COORDINATION | Facility: CLINIC | Age: 82
End: 2017-08-14

## 2017-08-14 NOTE — PROGRESS NOTES
Clinic Care Coordination Contact  Care Team Conversations    Patient calls into writer to report persistent frequent, soft/loose stools that have not improved with any medications prescribed.  Patient reports most recently trying Questran 4 gm packets 2 times/daily as prescribed on 7/30/17.  Patient inquires if Dr. Lockett has any other advice or ideas and whether she should continue the bowel medications or if she should stop since she has noted no improvement with any of them.   Patient denies any new or worsening symptoms, only persistent frequent bowel movements.    Please advise.    Melissa Behl BSN, RN, N  [facility] Care Coordinator  [phone]  [email or web address]

## 2017-08-15 DIAGNOSIS — R19.7 DIARRHEA, UNSPECIFIED TYPE: Primary | ICD-10-CM

## 2017-08-15 NOTE — PROGRESS NOTES
Clinic Care Coordination Contact  Care Team Conversations    RN CC spoke with patient and informed her of Dr. Lockett's message below.  Patient states she is not taking any type of stool softener or laxative.  Patient scheduled a lab only appointment for 8/18/17.  Patient will hold the Cipro and continue Questran and metronidazole as advised.    RN CC will continue to follow up with patient on a monthly basis and remain available to patient and care team as needed.    Melissa Behl BSN, RN, N  [facility] Care Coordinator  [phone]  [email or web address]

## 2017-08-15 NOTE — PROGRESS NOTES
First of all, let's make that she is not taking any stool softeners.   A normal fecal calprotectin stool test last 7/27/2017 indicates no inflammatory/infectious causes of diarrhea.  Recommend additional lab workup (blood tests).   She can come in non-fasting  Hold Cipro.  Continue Questran and Metronidazole.

## 2017-08-18 ENCOUNTER — INFUSION THERAPY VISIT (OUTPATIENT)
Dept: INFUSION THERAPY | Facility: CLINIC | Age: 82
End: 2017-08-18
Payer: MEDICARE

## 2017-08-18 VITALS
RESPIRATION RATE: 20 BRPM | WEIGHT: 158.5 LBS | BODY MASS INDEX: 28.08 KG/M2 | SYSTOLIC BLOOD PRESSURE: 164 MMHG | TEMPERATURE: 97.1 F | DIASTOLIC BLOOD PRESSURE: 66 MMHG | HEART RATE: 84 BPM

## 2017-08-18 DIAGNOSIS — M05.79 RHEUMATOID ARTHRITIS INVOLVING MULTIPLE SITES WITH POSITIVE RHEUMATOID FACTOR (H): Primary | ICD-10-CM

## 2017-08-18 DIAGNOSIS — M05.79 RHEUMATOID ARTHRITIS INVOLVING MULTIPLE SITES WITH POSITIVE RHEUMATOID FACTOR (H): ICD-10-CM

## 2017-08-18 DIAGNOSIS — R19.7 DIARRHEA, UNSPECIFIED TYPE: ICD-10-CM

## 2017-08-18 DIAGNOSIS — Z79.899 HIGH RISK MEDICATION USE: ICD-10-CM

## 2017-08-18 LAB
ALBUMIN SERPL-MCNC: 3.7 G/DL (ref 3.4–5)
ALP SERPL-CCNC: 55 U/L (ref 40–150)
ALT SERPL W P-5'-P-CCNC: 31 U/L (ref 0–50)
AST SERPL W P-5'-P-CCNC: 20 U/L (ref 0–45)
BASOPHILS # BLD AUTO: 0 10E9/L (ref 0–0.2)
BASOPHILS NFR BLD AUTO: 0.3 %
BILIRUB DIRECT SERPL-MCNC: 0.1 MG/DL (ref 0–0.2)
BILIRUB SERPL-MCNC: 0.5 MG/DL (ref 0.2–1.3)
CREAT SERPL-MCNC: 1.18 MG/DL (ref 0.52–1.04)
CRP SERPL-MCNC: <2.9 MG/L (ref 0–8)
DIFFERENTIAL METHOD BLD: ABNORMAL
EOSINOPHIL # BLD AUTO: 0.1 10E9/L (ref 0–0.7)
EOSINOPHIL NFR BLD AUTO: 2.4 %
ERYTHROCYTE [DISTWIDTH] IN BLOOD BY AUTOMATED COUNT: 14.1 % (ref 10–15)
ERYTHROCYTE [SEDIMENTATION RATE] IN BLOOD BY WESTERGREN METHOD: 18 MM/H (ref 0–30)
GFR SERPL CREATININE-BSD FRML MDRD: 43 ML/MIN/1.7M2
HCT VFR BLD AUTO: 35.6 % (ref 35–47)
HGB BLD-MCNC: 12.1 G/DL (ref 11.7–15.7)
LYMPHOCYTES # BLD AUTO: 1.4 10E9/L (ref 0.8–5.3)
LYMPHOCYTES NFR BLD AUTO: 24.5 %
MCH RBC QN AUTO: 32.6 PG (ref 26.5–33)
MCHC RBC AUTO-ENTMCNC: 34 G/DL (ref 31.5–36.5)
MCV RBC AUTO: 96 FL (ref 78–100)
MONOCYTES # BLD AUTO: 0.5 10E9/L (ref 0–1.3)
MONOCYTES NFR BLD AUTO: 7.8 %
NEUTROPHILS # BLD AUTO: 3.8 10E9/L (ref 1.6–8.3)
NEUTROPHILS NFR BLD AUTO: 65 %
PLATELET # BLD AUTO: 253 10E9/L (ref 150–450)
PROT SERPL-MCNC: 7.5 G/DL (ref 6.8–8.8)
RBC # BLD AUTO: 3.71 10E12/L (ref 3.8–5.2)
WBC # BLD AUTO: 5.8 10E9/L (ref 4–11)

## 2017-08-18 PROCEDURE — 85652 RBC SED RATE AUTOMATED: CPT | Performed by: FAMILY MEDICINE

## 2017-08-18 PROCEDURE — 96365 THER/PROPH/DIAG IV INF INIT: CPT | Performed by: INTERNAL MEDICINE

## 2017-08-18 PROCEDURE — 83516 IMMUNOASSAY NONANTIBODY: CPT | Performed by: INTERNAL MEDICINE

## 2017-08-18 PROCEDURE — 86140 C-REACTIVE PROTEIN: CPT | Performed by: INTERNAL MEDICINE

## 2017-08-18 PROCEDURE — 82784 ASSAY IGA/IGD/IGG/IGM EACH: CPT | Performed by: INTERNAL MEDICINE

## 2017-08-18 PROCEDURE — 99207 ZZC NO CHARGE LOS: CPT

## 2017-08-18 PROCEDURE — 80076 HEPATIC FUNCTION PANEL: CPT | Performed by: FAMILY MEDICINE

## 2017-08-18 PROCEDURE — 36415 COLL VENOUS BLD VENIPUNCTURE: CPT | Performed by: FAMILY MEDICINE

## 2017-08-18 PROCEDURE — 82565 ASSAY OF CREATININE: CPT | Performed by: FAMILY MEDICINE

## 2017-08-18 PROCEDURE — 85025 COMPLETE CBC W/AUTO DIFF WBC: CPT | Performed by: FAMILY MEDICINE

## 2017-08-18 RX ADMIN — Medication 250 ML: at 10:33

## 2017-08-18 ASSESSMENT — PAIN SCALES - GENERAL: PAINLEVEL: NO PAIN (0)

## 2017-08-18 NOTE — PROGRESS NOTES
"Infusion Nursing Note:  Linda KATEY Orellana presents today for Orencia.    Patient seen by provider today: No   present during visit today: Not Applicable.    Note: N/A.    Intravenous Access:  Peripheral IV placed.    Treatment Conditions:  Rheumatology Infusion Checklist: PRIOR TO INFUSION OF BIOLOGICAL MEDICATIONS OR ANY OF THESE AS LISTED: Orencia (abatacept) \".rheumbiologicalchecklist\"    Prior to Infusion of biological medications or any of these as listed:    1. Elevated temperature, fever, chills, productive cough or abnormal vital signs, night sweats, coughing up blood or sputum, no appetite or abnormal vital signs : NO    2. Open wounds or new incisions: NO    3. Recent hospitalization: NO    4.  Recent surgeries:  NO    5. Any upcoming surgeries or dental procedures?:NO    6. Any current or recent bouts of illness or infection? On any antibiotics? : NO    7. Any new, sudden or worsening abdominal pain :YES, Patient saw Dr. Davenport on 8/1/17. Ok to proceed with Orencia.    8. Vaccination within 4 weeks? Patient or someone in the household is scheduled to receive vaccination? No live virus vaccines prior to or during treatment :NO    9. Any nervous system diseases [i.e. multiple sclerosis, Guillain-Blomkest, seizures, neurological  changes]: NO    10. Pregnant or breast feeding; or plans on pregnancy in the future: NO    11. Signs of worsening depression or suicidal ideations while taking benlysta:NO    12. New-onset medical symptoms: NO    13.  New cancer diagnosis or on chemotherapy or radiation NO    14.  Evaluate for any sign of active TB [Unexplained weight loss, Loss of appetite, Night sweats, Fever, Fatigue, Chills, Coughing for 3 weeks or longer, Hemoptysis (coughing up blood), Chest pain]: NO    **Note: If answered yes to any of the above, hold the infusion and contact ordering rheumatologist or on-call rheumatologist.   .        Post Infusion Assessment:  Patient tolerated infusion without " incident.  Blood return noted pre and post infusion.  Site patent and intact, free from redness, edema or discomfort.  Access discontinued per protocol.  Rheumatology Post Infusion: POST-INFUSION OF BIOLOGICAL MEDICATION:    Reviewed with patient.  Given biologic medication or medication hand-out. Inform patient if any fever, chills or signs of infection, new symptoms, abdominal pain, heart palpitations, shortness of breath, reaction, weakness, neurological changes, seek medical attention immediately and should not receive infusions. No live virus vaccines prior to or during treatment or up to 6 months post infusion. If the patient has an upcoming procedure or surgery, this should be discussed with the rheumatologist and surgeon or provider..    Discharge Plan:   Patient discharged in stable condition accompanied by: daughter.  Departure Mode: Ambulatory.    Trinity Villalpando RN

## 2017-08-18 NOTE — MR AVS SNAPSHOT
After Visit Summary   8/18/2017    Linda Spicer    MRN: [id number]           Patient Information     Date Of Birth          6/13/1931        Visit Information        Provider Department      8/18/2017 9:30 AM BAY 3 INFUSION [facility]        Today's Diagnoses     Rheumatoid arthritis involving multiple sites with positive rheumatoid factor (H)    -  1       Follow-ups after your visit        Your next 10 appointments already scheduled     Sep 11, 2017  1:10 PM CDT   Return Visit with Emeterio Loza MD   [facility] ([facility])    [id number] [place] [address]   [phone]            Sep 15, 2017  9:30 AM CDT   Level 2 with BAY 1 INFUSION   [facility] ([facility])    [address] [place] [address]   [phone]            Oct 04, 2017  4:30 PM CDT   Return Visit with DEVICE CHECK RN CARD   [facility] ([facility])    [address] [place] [address]   [phone]            Oct 13, 2017  9:30 AM CDT   Level 2 with BAY 4 INFUSION   [facility] ([facility])    [address] [place] [address]   [phone]            Nov 07, 2017  9:20 AM CST   [facility]    [address] [facility] [id number] [phone]              Who to contact     If you have questions or need follow up information about today's clinic visit or your schedule please contact [facility] directly at [phone].  Normal or non-critical lab and imaging results will be communicated to you by MyChart, letter or phone within 4 business days after the clinic has received the results. If you do not hear from us within 7 days, please contact the clinic through MyChart or phone. If you  have a critical or abnormal lab result, we will notify you by phone as soon as possible.  Submit refill requests through kabuku or call your pharmacy and they will forward the refill request to us. Please allow 3 business days for your refill to be completed.          Additional Information About Your Visit        Girly [employer]uff[employer] Information     kabuku gives you secure access to your electronic health record. If you see a primary care provider, you can also send messages to your care team and make appointments. If you have questions, please call your primary care clinic.  If you do not have a primary care provider, please call [phone] and they will assist you.      kabuku is an electronic gateway that provides easy, online access to your medical records. With kabuku, you can request a clinic appointment, read your test results, renew a prescription or communicate with your care team.     To access your existing account, please contact your [facility] or call [phone] for assistance.        Care EveryWhere ID     This is your Care EveryWhere ID. This could be used by other organizations to access your [place] medical records  [id number]        Your Vitals Were     Pulse Temperature Respirations BMI (Body Mass Index)          84 97.1  F (36.2  C) (Oral) 20 28.08 kg/m2         Blood Pressure from Last 3 Encounters:   08/18/17 164/66   08/01/17 136/78   07/26/17 137/78    Weight from Last 3 Encounters:   08/18/17 71.9 kg (158 lb 8 oz)   08/01/17 72.3 kg (159 lb 6.4 oz)   07/26/17 72.6 kg (160 lb)              Today, you had the following     No orders found for display       Primary Care Provider Office Phone # Fax #    Tre Lockett [phone]       [address]  [facility] [address]        Equal Access to Services     [facility] : Paul Goyal, zofia quinn, qazahraa malhotra.  So [facility] [phone].    ATENCIÓN: Si rik jessica, tiene a merchant disposición servicios gratuitos de asistencia lingüística. Alin george [phone].    We comply with applicable federal civil rights laws and [place] laws. We do not discriminate on the basis of race, color, national origin, age, disability sex, sexual orientation or gender identity.            Thank you!     Thank you for choosing [facility]  for your care. Our goal is always to provide you with excellent care. Hearing back from our patients is one way we can continue to improve our services. Please take a few minutes to complete the written survey that you may receive in the mail after your visit with us. Thank you!             Your Updated Medication List - Protect others around you: Learn how to safely use, store and throw away your medicines at www.disposemymeds.org.          This list is accurate as of: 8/18/17  1:26 PM.  Always use your most recent med list.                   Brand Name Dispense Instructions for use Diagnosis    apixaban ANTICOAGULANT 2.5 MG tablet    ELIQUIS    60 tablet    Take 1 tablet (2.5 mg) by mouth 2 times daily    H/O atrial flutter       azaTHIOprine 50 MG tablet    IMURAN    90 tablet    Take 1 tablet (50 mg) by mouth daily    Rheumatoid arthritis involving multiple sites with positive rheumatoid factor (H), High risk medication use       bismuth subsalicylate 262 MG Tabs     80 tablet    Take 1 tablet by mouth every 4 hours as needed    Acute infectious diarrhea, Enteritis due to Norovirus       Blood Pressure Monitor Kit     1 kit    1 kit daily as needed    CHF (congestive heart failure) (H)       calcium-vitamin D 600-400 MG-UNIT per tablet    CALTRATE     Take 1 tablet by mouth 2 times daily        Carboxymethylcellulose Sodium 1 % Gel      1-2 drops (aka Genteal)        cholestyramine 4 G Packet    QUESTRAN    60 each    Take 1 packet (4 g) by mouth 2 times daily (with meals)    Acute diarrhea        ciprofloxacin 250 MG tablet    CIPRO    90 tablet    Take 1 tablet (250 mg) by mouth daily    Chronic UTI       Cranberry 180 MG Caps      Take 1 capsule by mouth daily Unsure of strength        fish oil-omega-3 fatty acids 1000 MG capsule      Take 2 g by mouth daily. 2 capsules daily        FLAGYL PO      Take 500 mg by mouth 2 times daily    Acute infectious diarrhea       folic acid 1 MG tablet    FOLVITE    100 tablet    Take 1 tablet (1 mg) by mouth daily    Oral aphthae       GENTEAL MILD OP      Apply  to eye daily.        IBANdronate 150 MG tablet    BONIVA    1 tablet    Take 1 tablet (150 mg) by mouth every 30 days Take 60 minutes before am meal with 8 oz. water. Remain upright for 30 minutes.    Osteopenia       levothyroxine 75 MCG tablet    SYNTHROID/LEVOTHROID    90 tablet    TAKE ONE TABLET BY MOUTH EVERY DAY    Hypothyroidism, unspecified type       losartan 25 MG tablet    COZAAR    180 tablet    Take 2 tablets (50 mg) by mouth daily (with dinner) Hold if SBP less than 110.    Hypertension goal BP (blood pressure) < 140/90       LUCENTIS IO      1 injection every 4 Weeks        metoprolol 25 MG 24 hr tablet    TOPROL-XL    45 tablet    Take 0.5 tablets (12.5 mg) by mouth daily    Paroxysmal atrial flutter (H)       nitroGLYcerin 0.4 MG sublingual tablet    NITROSTAT    25 tablet    Place 1 tablet (0.4 mg) under the tongue every 5 minutes as needed for chest pain    Chest pain       * order for DME     1 Box    Equipment being ordered: Dispense face mask. Mrs. Spicer is immunosuppressed due to rheumatoid arthritis.    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 Use with Albuterol.    Hypoxia       order for DME     1 each    Equipment being ordered: Dispense baffle, for use with nebulizer.    Pneumonia of left upper lobe due to Mycoplasma pneumoniae       * order for DME     1 each     Equipment being ordered: Left wrist splint.    Injury of left hand, initial encounter       * order for DME     1 each    Equipment being ordered: Left wrist splint.    Left wrist injury, initial encounter       PRESERVISION AREDS PO      Take 2 tablets by mouth daily        ranitidine 150 MG tablet    ZANTAC    60 tablet    Take 1 tablet (150 mg) by mouth 2 times daily    Gastroesophageal reflux disease without esophagitis       REFRESH P.M. Oint      Apply  to eye. Daily at bedtime        saccharomyces boulardii 250 MG capsule    FLORASTOR     Take 250 mg by mouth        senna-docusate 8.6-50 MG per tablet    SENOKOT-S;PERICOLACE    120 tablet    Take 2 tablets by mouth At Bedtime Hold if diarrhea occurs.    Constipation, chronic       SPIRONOLACTONE PO      Take 25 mg by mouth every morning Hold if SBP is less than 120.    Congestive heart failure with preserved LV function, NYHA class 3 (H)       triamcinolone 0.1 % cream    KENALOG    453.6 g    Apply sparingly to affected area on face three times daily.    Facial lesion       ZYRTEC ALLERGY 10 MG tablet   Generic drug:  cetirizine      Take 10 mg by mouth At Bedtime        * Notice:  This list has 4 medication(s) that are the same as other medications prescribed for you. Read the directions carefully, and ask your doctor or other care provider to review them with you.

## 2017-08-21 ENCOUNTER — CARE COORDINATION (OUTPATIENT)
Dept: CARE COORDINATION | Facility: CLINIC | Age: 82
End: 2017-08-21

## 2017-08-21 LAB — IGA SERPL-MCNC: 351 MG/DL (ref 70–380)

## 2017-08-21 NOTE — PROGRESS NOTES
Also remind patient that Omeprazole is a proton pump inhibitor, and side effects include diarrhea.  While Ranitidine is a H2 receptor blocker, and side effects include constipation.  Both medications are not the same and they different mechanisms of action.

## 2017-08-21 NOTE — PROGRESS NOTES
Clinic Care Coordination Contact  Care Team Conversations    Patient called into care coordination to inquire if her test results ordered by Dr. Lockett and drawn on 8/18/17 were back.  RN CC informed 1 test is still pending.    Patient inquired if her tests come back negative if Dr. Lockett would refer her to a gastroenterologist and if he had any recommendations for a particular provider due to her persistent loose stools for the past 2 months.      Melissa Behl BSN, RN, N  [facility] Care Coordinator  [phone]  [email or web address]

## 2017-08-22 DIAGNOSIS — A09 ACUTE INFECTIOUS DIARRHEA: ICD-10-CM

## 2017-08-22 LAB
C DIFF TOX B STL QL: ABNORMAL
SPECIMEN SOURCE: ABNORMAL
TTG IGA SER-ACNC: 1 U/ML
TTG IGG SER-ACNC: <1 U/ML

## 2017-08-22 NOTE — PROGRESS NOTES
This writer attempted to contact Linda on 08/22/17      Reason for call inform of pending test results and left message to return call.      When patient calls back, please contact clinic RN team. If no one available, document that pt called and route to care team. routine priority.          Sobia Hung RN

## 2017-08-25 NOTE — PROGRESS NOTES
"BRANDI FRANCIS spoke with Dr. Lockett who requested patient to be informed of the following:  Negative fecal calprotectin  Negative fecal culture  No effect from Flagyl and cholestyramine, so patient is to stop these medications.  Dr. Lockett will be prescribing rifaximin for patient for new diagnosis of IBS diarrhea.    RN CC informed patient of Dr. Lockett's recommendations above and patient taught back information.    Patient inquired if she should be taking omeprazole and ranitidine, \"aren't they the same thing?\"  RN CC did not observe omeprazole on patient's current medication list and noted it was discontinued 7/30/17.  Upon chart review, it was noted that patient was instructed to stop omeprazole in 7/30/17 result notes:    A normal fecal calprotectin test means that your diarrhea is not from either infectious causes or even inflammatory causes. Therefore, it might be from irritable bowel syndrome or drug-induced. Stop Omeprazole, which can cause diarrhea and substitute with Ranitidine (Rx sent)  Also consider Cholestyramine powder twice a day also. I sent Rx for Cholestyramine for filing.     RN CC informed patient of this information who verbalized understanding.    FYI to Dr. Lockett that patient had not stopped omeprazole as instructed on 7/30/17.    BRANDI FRANCIS informed Dr. Lockett that patient had not stopped omeprazole and he advised for patient to wait a week to determine if symptoms resolve, as patient may not need rifaximin.  RN CC informed patient of this information who verbalized understanding.  Patient states she will call writer in 1 week with an update of symptoms.    Melissa Behl BSN, RN, N  [facility] Care Coordinator  [phone]  [email or web address]    "

## 2017-08-30 ENCOUNTER — TELEPHONE (OUTPATIENT)
Dept: NURSING | Facility: CLINIC | Age: 82
End: 2017-08-30

## 2017-08-30 ENCOUNTER — RADIANT APPOINTMENT (OUTPATIENT)
Dept: GENERAL RADIOLOGY | Facility: CLINIC | Age: 82
End: 2017-08-30
Attending: INTERNAL MEDICINE
Payer: MEDICARE

## 2017-08-30 ENCOUNTER — OFFICE VISIT (OUTPATIENT)
Dept: FAMILY MEDICINE | Facility: CLINIC | Age: 82
End: 2017-08-30
Payer: MEDICARE

## 2017-08-30 ENCOUNTER — CARE COORDINATION (OUTPATIENT)
Dept: CARE COORDINATION | Facility: CLINIC | Age: 82
End: 2017-08-30

## 2017-08-30 VITALS
HEIGHT: 63 IN | SYSTOLIC BLOOD PRESSURE: 120 MMHG | WEIGHT: 157 LBS | HEART RATE: 66 BPM | BODY MASS INDEX: 27.82 KG/M2 | DIASTOLIC BLOOD PRESSURE: 62 MMHG | OXYGEN SATURATION: 96 % | TEMPERATURE: 97.9 F

## 2017-08-30 DIAGNOSIS — E78.5 HYPERLIPIDEMIA LDL GOAL <100: ICD-10-CM

## 2017-08-30 DIAGNOSIS — I50.30 HEART FAILURE WITH PRESERVED EJECTION FRACTION (H): ICD-10-CM

## 2017-08-30 DIAGNOSIS — I48.20 CHRONIC ATRIAL FIBRILLATION (H): Primary | ICD-10-CM

## 2017-08-30 DIAGNOSIS — I10 HYPERTENSION GOAL BP (BLOOD PRESSURE) < 150/90: ICD-10-CM

## 2017-08-30 DIAGNOSIS — J84.10 DIFFUSE IDIOPATHIC PULMONARY FIBROSIS (H): ICD-10-CM

## 2017-08-30 DIAGNOSIS — Z87.440 PERSONAL HISTORY OF URINARY TRACT INFECTION: ICD-10-CM

## 2017-08-30 LAB
ALBUMIN UR-MCNC: NEGATIVE MG/DL
APPEARANCE UR: CLEAR
BILIRUB UR QL STRIP: NEGATIVE
CHOLEST SERPL-MCNC: 146 MG/DL
COLOR UR AUTO: YELLOW
GLUCOSE UR STRIP-MCNC: NEGATIVE MG/DL
HDLC SERPL-MCNC: 50 MG/DL
HGB UR QL STRIP: NEGATIVE
KETONES UR STRIP-MCNC: NEGATIVE MG/DL
LDLC SERPL CALC-MCNC: 76 MG/DL
LEUKOCYTE ESTERASE UR QL STRIP: NEGATIVE
NITRATE UR QL: NEGATIVE
NONHDLC SERPL-MCNC: 96 MG/DL
PH UR STRIP: 5.5 PH (ref 5–7)
SOURCE: NORMAL
SP GR UR STRIP: 1.01 (ref 1–1.03)
TRIGL SERPL-MCNC: 101 MG/DL
TSH SERPL DL<=0.005 MIU/L-ACNC: 0.77 MU/L (ref 0.4–4)
UROBILINOGEN UR STRIP-ACNC: 0.2 EU/DL (ref 0.2–1)

## 2017-08-30 PROCEDURE — 36415 COLL VENOUS BLD VENIPUNCTURE: CPT | Performed by: INTERNAL MEDICINE

## 2017-08-30 PROCEDURE — 99214 OFFICE O/P EST MOD 30 MIN: CPT | Performed by: INTERNAL MEDICINE

## 2017-08-30 PROCEDURE — 71020 XR CHEST 2 VW: CPT

## 2017-08-30 PROCEDURE — 80061 LIPID PANEL: CPT | Performed by: INTERNAL MEDICINE

## 2017-08-30 PROCEDURE — 84443 ASSAY THYROID STIM HORMONE: CPT | Performed by: INTERNAL MEDICINE

## 2017-08-30 PROCEDURE — 81003 URINALYSIS AUTO W/O SCOPE: CPT | Performed by: INTERNAL MEDICINE

## 2017-08-30 RX ORDER — METOPROLOL SUCCINATE 25 MG/1
25 TABLET, EXTENDED RELEASE ORAL EVERY MORNING
Qty: 90 TABLET | Refills: 3 | Status: SHIPPED | OUTPATIENT
Start: 2017-08-30 | End: 2017-12-07

## 2017-08-30 NOTE — PROGRESS NOTES
SUBJECTIVE:   Linda Spicer is a 86 year old female who presents to clinic today for the following health issues:  Chief Complaint   Patient presents with     Dizziness     lightheaded     Tachycardia     Heart rate elevated above normal     Shortness of Breath     Forgot meds, heart rate going up.          Problem list and histories reviewed & adjusted, as indicated.  Additional history: as documented    Patient Active Problem List   Diagnosis     ACP (advance care planning)     Hypertension goal BP (blood pressure) < 150/90     Hypothyroid     Diffuse idiopathic pulmonary fibrosis (H)     Macular degeneration, left eye     Irritable bowel syndrome     Encounter for palliative care     Adjustment disorder with anxious mood     Mild anemia     DDD (degenerative disc disease), lumbar     CKD (chronic kidney disease) stage 3, GFR 30-59 ml/min     History of blood transfusion     Aftercare following surgery     S/P lumbar laminectomy     High risk medication use     Osteopenia     Atrophic vaginitis     Fecal incontinence     Female stress incontinence     Impingement syndrome of both shoulders     UIP (usual interstitial pneumonitis) (H)     High risk medications (not anticoagulants) long-term use     Heart failure with preserved ejection fraction (H)     Congestive heart failure with preserved LV function, NYHA class 3 (H)     Other specified hypothyroidism     Rheumatoid arthritis involving multiple sites with positive rheumatoid factor (H)     Health Care Home     Sick sinus syndrome (H)     Cardiac pacemaker - Medtronic dual lead pacemaker - Not Dependent - MRI Safe     Past Surgical History:   Procedure Laterality Date     APPENDECTOMY       BIOPSY      hemorrhoidectomy     ENT SURGERY      tonsillectomy     GYN SURGERY      3 D & C's     HYSTERECTOMY, PAP NO LONGER INDICATED       LAMINECTOMY LUMBAR ONE LEVEL N/A 10/13/2015    Procedure: LAMINECTOMY LUMBAR ONE LEVEL;  Surgeon: Fransico Toussaint MD;   Location:  OR       Social History   Substance Use Topics     Smoking status: Never Smoker     Smokeless tobacco: Never Used     Alcohol use Yes      Comment: rare wine      Family History   Problem Relation Age of Onset     Hypertension Mother      Psychotic Disorder Father      DIABETES Son      DIABETES Daughter      Blood Disease Daughter          Allergies   Allergen Reactions     Cephalexin Hcl Diarrhea     Gabapentin Other (See Comments)     Dizzsiness     Naproxen GI Disturbance     Perfume      Lactase Other (See Comments)     Macrobid [Nitrofurantoin Anhydrous]      Possibly related to lung disease      Sulfa Drugs      Throat swelling     Xanax [Alprazolam] Other (See Comments)     Dizziness      Recent Labs   Lab Test  08/30/17   1214  08/18/17   0934  07/26/17   1144  07/21/17   0859  06/27/17   0852   03/24/17   1401  02/16/17   0949   06/03/16   0756   05/22/14   0821   LDL  76   --    --    --    --    --    --    --    --   109*   --   97   HDL  50   --    --    --    --    --    --    --    --   47*   --   42*   TRIG  101   --    --    --    --    --    --    --    --   75   --   84   ALT   --   31   --   31  26   < >  36  21   < >  36   < >   --    CR   --   1.18*  1.32*  1.24*  1.19*   < >  1.31*  1.24*   < >  1.44*   < >  1.21*   GFRESTIMATED   --   43*  38*  41*  43*   < >  39*  41*   < >  35*   < >  43*   GFRESTBLACK   --   53*  46*  50*  52*   < >  47*  50*   < >  42*   < >  52*   POTASSIUM   --    --    --    --    --    --   3.7  4.7   < >  5.1   < >  4.1   TSH  0.77   --    --    --    --    --    --   0.93   < >   --    < >  0.58    < > = values in this interval not displayed.      BP Readings from Last 3 Encounters:   08/30/17 120/62   08/18/17 164/66   08/01/17 136/78    Wt Readings from Last 3 Encounters:   08/30/17 157 lb (71.2 kg)   08/18/17 158 lb 8 oz (71.9 kg)   08/01/17 159 lb 6.4 oz (72.3 kg)                  Labs reviewed in EPIC          Reviewed and updated as needed this  "visit by clinical staff       Reviewed and updated as needed this visit by Provider         ROS:  C: NEGATIVE for fever, chills, change in weight  I: NEGATIVE for worrisome rashes, moles or lesions  E: NEGATIVE for vision changes or irritation  E/M: NEGATIVE for ear, mouth and throat problems  R: NEGATIVE for significant cough or SOB  B: NEGATIVE for masses, tenderness or discharge  CV: NEGATIVE for chest pain, palpitations or peripheral edema  GI: NEGATIVE for nausea, abdominal pain, heartburn, or change in bowel habits  : NEGATIVE for frequency, dysuria, or hematuria  M: NEGATIVE for significant arthralgias or myalgia  N: NEGATIVE for weakness, dizziness or paresthesias  E: NEGATIVE for temperature intolerance, skin/hair changes  H: NEGATIVE for bleeding problems  P: NEGATIVE for changes in mood or affect    OBJECTIVE:     /62  Pulse 66  Temp 97.9  F (36.6  C) (Oral)  Ht 5' 3\" (1.6 m)  Wt 157 lb (71.2 kg)  SpO2 96%  BMI 27.81 kg/m2  Body mass index is 27.81 kg/(m^2).  GENERAL: healthy, alert and no distress  EYES: Eyes grossly normal to inspection, PERRL and conjunctivae and sclerae normal  HENT: ear canals and TM's normal, nose and mouth without ulcers or lesions  NECK: no adenopathy, no asymmetry, masses, or scars and thyroid normal to palpation  RESP: lungs clear to auscultation - no rales, rhonchi or wheezes  CV: regular rate and rhythm, normal S1 S2, no S3 or S4, no murmur, click or rub, no peripheral edema and peripheral pulses strong  ABDOMEN: soft, nontender, no hepatosplenomegaly, no masses and bowel sounds normal  MS: no gross musculoskeletal defects noted, no edema  SKIN: no suspicious lesions or rashes  NEURO: Normal strength and tone, mentation intact and speech normal  PSYCH: mentation appears normal, affect normal/bright    Diagnostic Test Results:  Results for orders placed or performed in visit on 08/30/17   XR Chest 2 Views    Narrative    XR CHEST 2 VW 8/30/2017 12:29 " PM    HISTORY: Heart failure.    COMPARISON: April 19, 2016.      Impression    IMPRESSION: Left-sided cardiac device in place with leads in the right  atrium and ventricle. Peripheral interstitial prominence consistent  with fibrotic changes as before. No new focal pulmonary opacities. No  pleural effusions or pneumothorax. Stable heart size.    DARLENE BELL MD   Lipid panel reflex to direct LDL   Result Value Ref Range    Cholesterol 146 <200 mg/dL    Triglycerides 101 <150 mg/dL    HDL Cholesterol 50 >49 mg/dL    LDL Cholesterol Calculated 76 <100 mg/dL    Non HDL Cholesterol 96 <130 mg/dL   *UA reflex to Microscopic   Result Value Ref Range    Color Urine Yellow     Appearance Urine Clear     Glucose Urine Negative NEG^Negative mg/dL    Bilirubin Urine Negative NEG^Negative    Ketones Urine Negative NEG^Negative mg/dL    Specific Gravity Urine 1.015 1.003 - 1.035    Blood Urine Negative NEG^Negative    pH Urine 5.5 5.0 - 7.0 pH    Protein Albumin Urine Negative NEG^Negative mg/dL    Urobilinogen Urine 0.2 0.2 - 1.0 EU/dL    Nitrite Urine Negative NEG^Negative    Leukocyte Esterase Urine Negative NEG^Negative    Source Midstream Urine    TSH with free T4 reflex   Result Value Ref Range    TSH 0.77 0.40 - 4.00 mU/L       ASSESSMENT/PLAN:         (I48.2) Chronic atrial fibrillation (H)  (primary encounter diagnosis)  Comment:   Plan: metoprolol (TOPROL-XL) 25 MG 24 hr tablet, TSH         with free T4 reflex            (E78.5) Hyperlipidemia LDL goal <100  Comment:   Plan: Lipid panel reflex to direct LDL            (I10) Hypertension goal BP (blood pressure) < 150/90  Comment:   Plan:     (Z87.440) Personal history of urinary tract infection  Comment:   Plan: *UA reflex to Microscopic            (J84.10) Diffuse idiopathic pulmonary fibrosis (H)  Comment:   Plan:     (I50.30) Heart failure with preserved ejection fraction (H)  Comment:   Plan: XR Chest 2 Views            Follow-up visit if condition  worsens.      Tre Lockett MD  [facility]

## 2017-08-30 NOTE — PROGRESS NOTES
Clinic Care Coordination Contact  Care Team Conversations    RN CC received a voicemail from patient at 9:44 am stating she had to talk to Dr. Lockett based on the cardiology office's recommendation, as her cardiologist is out of the office this week.  RN CC noted patient spoke with Dr. Lockett's triage RN today and will be coming in to see Dr. Lockett at 11:00 am.  RN CC spoke with patient who states she realized she forgot to take her evening pills and this morning has increased heart rate from baseline and increased shortness of breath from baseline.  See 8/30/17 telephone encounter for full triage assessment.  Patient will be coming in to see Dr. Lockett at 11:00 am.    RN CC will follow up with patient as previously planned.    Melissa Behl BSN, RN, PHN  [facility] Care Coordinator  [phone]  [email or web address]

## 2017-08-30 NOTE — TELEPHONE ENCOUNTER
Patient calling. States since 9:10 this morning she has had lightheadedness. BP is 124/69. Pulse is varied from . No chest pain. Has a little more shortness of breath than usual (has pulmonary fibrosis) but denies edema and swelling. States she slept well last night. Confirmed she is taking spironolactone 25 mg, toprol 12.5 mg, losartan 25 mg ( ordered to be taking 50 mg daily), and eliquis 2.5 mg twice a day.  Patient was noted to be short of breath on the phone. Informed patient that she should be seen, she can try Dr Lockett's office but she may need to go to the ED. She may have some extra fluid on board, or currently be in Atrial fib and not tolerating it. She will call Dr Lockett's office.     In researching change in Losartan dose she was discharged on the 25 mg daily on 3/23/17.     Stacey Perez RN  Cardiology Care Coordinator  [facility]  Phone: [phone]

## 2017-08-30 NOTE — PATIENT INSTRUCTIONS
This summary includes the important diagnoses, test, medications and other important parts of your medical history.  Below are a few good we sites you can use to learn more about these.     Www.TeamDynamix.org : Up to date and easily searchable information on multiple topics.  Www.TeamDynamix.org/Pharmacy/c_539084.asp : [facility] $4.99 medications  Www.medlineplus.gov : medication info, interactive tutorials, watch real surgeries online  Www.familydoctor.org : good info from the Academy of Family Physicians  Www.mayoEndorseinic.com : good info from the [facility]  Www.cdc.gov : public health info, travel advisories, epidemics (H1N1)  Www.aap.org : children's health info, normal development, vaccinations  Www.health.[facility].[place].us : [place] dept of heat, public health issues in [place], N1N1    Based on your medical history and these are the current health maintenance or preventive care services that you are due for (some may have been done at this visit:)  Health Maintenance Due   Topic Date Due     LIPID MONITORING Q1 YEAR  06/03/2017     BMP Q6 MOS  09/24/2017     =================================================================================  Normal Values   Blood pressure  <140/90 for most adults    <130/80 for some chronic diseases (ask your care team about yours)    BMI (body mass index)  18.5-25 kg/m2 (based on height and weight)     Thank you for visiting [facility]    Normal or non-critical lab and imaging results will be communicated to you by MyChart, letter or phone within 7 days.  If you do not hear from us within 10 days, please call the clinic. If you have a critical or abnormal lab result, we will notify you by phone as soon as possible.     If you have any questions regarding your visit please contact:     Team Comfort:   Clinic Hours Telephone Number   Dr. Aravind Novak   7am-5pm  Monday - Friday  [phone]  Viktor PAZ   Pharmacy 8am-8pm Monday-Thursday      8am-6pm Friday  9am-5pm Saturday-Sunday [phone]   Urgent Care 11am-8pm Monday-Friday        9am-5pm Saturday-Sunday [phone]     After hours, weekend or if you need to make an appointment with your primary provider please call [phone].   After Hours nurse advise: call [facility]: [phone]    Medication Refills:  Call your pharmacy and they will forward the refill to us. Please allow 3 business days for your refills to be completed.    Use Cegal (secure email communication and access to your chart) to send your primary care provider a message or make an appointment. Ask someone on your Team how to sign up for Cegal. To log on to [employer] or for more information in bVisual please visit the website at [email or web address].  As of October 8, 2013, all password changes, disabled accounts, or ID changes in Cegal/MyHealth will be done by our Access Services Department.   If you need help with your account or password, call: [phone]. Clinic staff no longer has the ability to change passwords.

## 2017-08-30 NOTE — MR AVS SNAPSHOT
After Visit Summary   8/30/2017    Linda Spicer    MRN: [id number]           Patient Information     Date Of Birth          6/13/1931        Visit Information        Provider Department      8/30/2017 11:00 AM Tre Lockett MD [facility]        Today's Diagnoses     Chronic atrial fibrillation (H)    -  1    Hyperlipidemia LDL goal <100        Hypertension goal BP (blood pressure) < 150/90        Personal history of urinary tract infection        Diffuse idiopathic pulmonary fibrosis (H)        Heart failure with preserved ejection fraction (H)          Care Instructions    This summary includes the important diagnoses, test, medications and other important parts of your medical history.  Below are a few good we sites you can use to learn more about these.     Www.[employer] : Up to date and easily searchable information on multiple topics.  Www.[employer]/Pharmacy/c_539084.asp : [facility] $4.99 medications  Www.[employer].gov : medication info, interactive tutorials, watch real surgeries online  Www.familydoctor.org : good info from the Academy of Family Physicians  Www.Sicuboinic.U.S. Fiduciary : good info from the [facility]  Www.cdc.gov : public health info, travel advisories, epidemics (H1N1)  Www.aap.org : children's health info, normal development, vaccinations  Www.health.[facility].[place].us : [place] dept of heatlh, public health issues in [place], N1N1    Based on your medical history and these are the current health maintenance or preventive care services that you are due for (some may have been done at this visit:)  Health Maintenance Due   Topic Date Due     LIPID MONITORING Q1 YEAR  06/03/2017     BMP Q6 MOS  09/24/2017     =================================================================================  Normal Values   Blood pressure  <140/90 for most adults    <130/80 for some chronic diseases (ask your care team about yours)    BMI (body mass index)  18.5-25 kg/m2  (based on height and weight)     Thank you for visiting [facility]    Normal or non-critical lab and imaging results will be communicated to you by MyChart, letter or phone within 7 days.  If you do not hear from us within 10 days, please call the clinic. If you have a critical or abnormal lab result, we will notify you by phone as soon as possible.     If you have any questions regarding your visit please contact:     Team Comfort:   Clinic Hours Telephone Number   Dr. Aravind Novak   7am-5pm  Monday - Friday [phone]  Viktor RN   Pharmacy 8am-8pm Monday-Thursday      8am-6pm Friday  9am-5pm Saturday-Sunday [phone]   Urgent Care 11am-8pm Monday-Friday        9am-5pm Saturday-Sunday [phone]     After hours, weekend or if you need to make an appointment with your primary provider please call [phone].   After Hours nurse advise: call [facility]: [phone]    Medication Refills:  Call your pharmacy and they will forward the refill to us. Please allow 3 business days for your refills to be completed.    Use Eloquii (secure email communication and access to your chart) to send your primary care provider a message or make an appointment. Ask someone on your Team how to sign up for Eloquii. To log on to [employer] or for more information in Enerplant please visit the website at [email or web address].  As of October 8, 2013, all password changes, disabled accounts, or ID changes in Eloquii/MyHealth will be done by our Access Services Department.   If you need help with your account or password, call: [phone]. Clinic staff no longer has the ability to change passwords.             Follow-ups after your visit        Your next 10 appointments already scheduled     Sep 11, 2017  1:10 PM CDT   Return Visit with Emeterio Loza MD   [facility] ([facility]  [facility])    [address] [address] [place] [address]   [phone]            Sep 15, 2017  9:30 AM CDT   Level 2 with BAY 1 INFUSION   [facility] ([facility]    [address] [address] [place] [address]   [phone]            Oct 04, 2017  4:30 PM CDT   Return Visit with DEVICE CHECK RN CARD   [facility] ([facility])    [address] [place] [address]   [phone]            Oct 13, 2017  9:30 AM CDT   Level 2 with BAY 4 INFUSION   [facility] ([facility])    [address] [place] [address]   [phone]            Nov 07, 2017  9:20 AM CST   Return Visit with Mario Davenport MD   [facility] ([facility])    [address] [facility] [id number] [phone]              Who to contact     If you have questions or need follow up information about today's clinic visit or your schedule please contact [facility] directly at [phone].  Normal or non-critical lab and imaging results will be communicated to you by Jibohart, letter or phone within 4 business days after the clinic has received the results. If you do not hear from us within 7 days, please contact the clinic through MyChart or phone. If you have a critical or abnormal lab result, we will notify you by phone as soon as possible.  Submit refill requests through Qraved or call your pharmacy and they will forward the refill request to us. Please allow 3 business days for your refill to be completed.          Additional Information About Your Visit        Qraved Information     Qraved gives you secure access to your electronic health record. If you see a primary care provider, you can also send messages to your care team and make appointments. If you have questions, please call your primary care clinic.  If you do not have a  "primary care provider, please call [phone] and they will assist you.        Care EveryWhere ID     This is your Care EveryWhere ID. This could be used by other organizations to access your [facility] medical records  [id number]        Your Vitals Were     Pulse Temperature Height Pulse Oximetry BMI (Body Mass Index)       66 97.9  F (36.6  C) (Oral) 5' 3\" (1.6 m) 96% 27.81 kg/m2        Blood Pressure from Last 3 Encounters:   08/30/17 120/62   08/18/17 164/66   08/01/17 136/78    Weight from Last 3 Encounters:   08/30/17 157 lb (71.2 kg)   08/18/17 158 lb 8 oz (71.9 kg)   08/01/17 159 lb 6.4 oz (72.3 kg)              We Performed the Following     *UA reflex to Microscopic     Lipid panel reflex to direct LDL     TSH with free T4 reflex          Today's Medication Changes          These changes are accurate as of: 8/30/17 12:09 PM.  If you have any questions, ask your nurse or doctor.               These medicines have changed or have updated prescriptions.        Dose/Directions    LOSARTAN POTASSIUM PO   This may have changed:  Another medication with the same name was removed. Continue taking this medication, and follow the directions you see here.   Changed by:  Tre Lockett MD        Dose:  25 mg   Take 25 mg by mouth daily (with dinner) Hold if SBP < 110.   Refills:  0       metoprolol 25 MG 24 hr tablet   Commonly known as:  TOPROL-XL   This may have changed:    - how much to take  - when to take this   Used for:  Chronic atrial fibrillation (H)   Changed by:  Tre Lockett MD        Dose:  25 mg   Take 1 tablet (25 mg) by mouth every morning   Quantity:  90 tablet   Refills:  3            Where to get your medicines      These medications were [place] [facility] [place] [address]  [address], [place] [address]     Phone:  [phone]     metoprolol 25 MG 24 hr tablet                Primary Care Provider Office Phone # Fax #    Tre " Jenny Lockett [phone]       [address]  [facility] [address]        Equal Access to Services     [facility] S[facility]AR : Hadii aad ku hadmyrajason Goyal, zofia cohendanyha, michaelta kagigida kimberlylashon, waxcameron chazin hayaacapri harrisnicolas narayanaddy skinner. So [facility] [phone].    ATENCIÓN: Si habla español, tiene a merchant disposición servicios gratuitos de asistencia lingüística. Llame al [phone].    We comply with applicable federal civil rights laws and [place] laws. We do not discriminate on the basis of race, color, national origin, age, disability sex, sexual orientation or gender identity.            Thank you!     Thank you for choosing [facility]  for your care. Our goal is always to provide you with excellent care. Hearing back from our patients is one way we can continue to improve our services. Please take a few minutes to complete the written survey that you may receive in the mail after your visit with us. Thank you!             Your Updated Medication List - Protect others around you: Learn how to safely use, store and throw away your medicines at www.disposemymeds.org.          This list is accurate as of: 8/30/17 12:09 PM.  Always use your most recent med list.                   Brand Name Dispense Instructions for use Diagnosis    apixaban ANTICOAGULANT 2.5 MG tablet    ELIQUIS    60 tablet    Take 1 tablet (2.5 mg) by mouth 2 times daily    H/O atrial flutter       azaTHIOprine 50 MG tablet    IMURAN    90 tablet    Take 1 tablet (50 mg) by mouth daily    Rheumatoid arthritis involving multiple sites with positive rheumatoid factor (H), High risk medication use       bismuth subsalicylate 262 MG Tabs     80 tablet    Take 1 tablet by mouth every 4 hours as needed    Acute infectious diarrhea, Enteritis due to Norovirus       Blood Pressure Monitor Kit     1 kit    1 kit daily as needed    CHF (congestive heart failure) (H)       calcium-vitamin D 600-400 MG-UNIT per  tablet    CALTRATE     Take 1 tablet by mouth 2 times daily        Carboxymethylcellulose Sodium 1 % Gel      1-2 drops (aka Genteal)        cholestyramine 4 G Packet    QUESTRAN    60 each    Take 1 packet (4 g) by mouth 2 times daily (with meals)    Acute diarrhea       ciprofloxacin 250 MG tablet    CIPRO    90 tablet    Take 1 tablet (250 mg) by mouth daily    Chronic UTI       Cranberry 180 MG Caps      Take 1 capsule by mouth daily Unsure of strength        fish oil-omega-3 fatty acids 1000 MG capsule      Take 2 g by mouth daily. 2 capsules daily        FLAGYL PO      Take 500 mg by mouth 2 times daily    Acute infectious diarrhea       folic acid 1 MG tablet    FOLVITE    100 tablet    Take 1 tablet (1 mg) by mouth daily    Oral aphthae       GENTEAL MILD OP      Apply  to eye daily.        IBANdronate 150 MG tablet    BONIVA    1 tablet    Take 1 tablet (150 mg) by mouth every 30 days Take 60 minutes before am meal with 8 oz. water. Remain upright for 30 minutes.    Osteopenia       levothyroxine 75 MCG tablet    SYNTHROID/LEVOTHROID    90 tablet    TAKE ONE TABLET BY MOUTH EVERY DAY    Hypothyroidism, unspecified type       LOSARTAN POTASSIUM PO      Take 25 mg by mouth daily (with dinner) Hold if SBP < 110.        LUCENTIS IO      1 injection every 4 Weeks        metoprolol 25 MG 24 hr tablet    TOPROL-XL    90 tablet    Take 1 tablet (25 mg) by mouth every morning    Chronic atrial fibrillation (H)       nitroGLYcerin 0.4 MG sublingual tablet    NITROSTAT    25 tablet    Place 1 tablet (0.4 mg) under the tongue every 5 minutes as needed for chest pain    Chest pain       * order for DME     1 Box    Equipment being ordered: Dispense face mask. Mrs. Spicer is immunosuppressed due to rheumatoid arthritis.    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 Use with Albuterol.    Hypoxia       order for DME      1 each    Equipment being ordered: Dispense baffle, for use with nebulizer.    Pneumonia of left upper lobe due to Mycoplasma pneumoniae       * order for DME     1 each    Equipment being ordered: Left wrist splint.    Injury of left hand, initial encounter       * order for DME     1 each    Equipment being ordered: Left wrist splint.    Left wrist injury, initial encounter       PRESERVISION AREDS PO      Take 2 tablets by mouth daily        ranitidine 150 MG tablet    ZANTAC    60 tablet    Take 1 tablet (150 mg) by mouth 2 times daily    Gastroesophageal reflux disease without esophagitis       REFRESH P.M. Oint      Apply  to eye. Daily at bedtime        saccharomyces boulardii 250 MG capsule    FLORASTOR     Take 250 mg by mouth        senna-docusate 8.6-50 MG per tablet    SENOKOT-S;PERICOLACE    120 tablet    Take 2 tablets by mouth At Bedtime Hold if diarrhea occurs.    Constipation, chronic       SPIRONOLACTONE PO      Take 25 mg by mouth every morning Hold if SBP is less than 120.    Congestive heart failure with preserved LV function, NYHA class 3 (H)       triamcinolone 0.1 % cream    KENALOG    453.6 g    Apply sparingly to affected area on face three times daily.    Facial lesion       ZYRTEC ALLERGY 10 MG tablet   Generic drug:  cetirizine      Take 10 mg by mouth At Bedtime        * Notice:  This list has 4 medication(s) that are the same as other medications prescribed for you. Read the directions carefully, and ask your doctor or other care provider to review them with you.

## 2017-08-30 NOTE — NURSING NOTE
"Chief Complaint   Patient presents with     Dizziness     lightheaded     Tachycardia     Heart rate elevated above normal     Shortness of Breath       Initial /80 (BP Location: Left arm, Patient Position: Chair, Cuff Size: Adult Regular)  Pulse 72  Temp 97.9  F (36.6  C) (Oral)  Ht 5' 3\" (1.6 m)  Wt 157 lb (71.2 kg)  SpO2 96%  BMI 27.81 kg/m2 Estimated body mass index is 27.81 kg/(m^2) as calculated from the following:    Height as of this encounter: 5' 3\" (1.6 m).    Weight as of this encounter: 157 lb (71.2 kg).  Medication Reconciliation: complete     Jorge Forrester MA      "

## 2017-08-31 ENCOUNTER — TELEPHONE (OUTPATIENT)
Dept: NURSING | Facility: CLINIC | Age: 82
End: 2017-08-31

## 2017-08-31 ENCOUNTER — CARE COORDINATION (OUTPATIENT)
Dept: CARE COORDINATION | Facility: CLINIC | Age: 82
End: 2017-08-31

## 2017-08-31 NOTE — PROGRESS NOTES
Though the patient's vital signs are normal, I suspect that she has paroxysmal episodes of rapid atrial fibrillation, that would've triggered her chest pain.   I indicated to Mrs. Spicer yesterday to INCREASE her Toprol XL to 25 mg once a day.  Otherwise, go to ER if symptoms persist.

## 2017-08-31 NOTE — LETTER
Critical access hospital  Complex Care Plan  About Me  Patient Name:  Linda Spicer    YOB: 1931  Age:   86 year old   [place] MRN: [id number] Telephone Information:     Home Phone [phone]   Mobile [phone]       Address:    [address]   [facility] [address] Email address:  [email or web address]      Emergency Contact(s)  Name Relationship Lgl Grd Work Phone Home Phone Mobile Phone   1. JENNIFER GAONA Daughter No NONE NONE [phone]   2. LEA SPICER Spouse No none [phone]   3. KAROLINA SPICER Son No NONE NONE [phone]   4. CAITY SPICER Son No none [phone]           Primary language:  English     needed? No   [facility]:  [phone] op. 1  Other communication barriers: No  Preferred Method of Communication:  Phone  Current living arrangement: I live in a private home with spouse (Live in a Senior Apartment)  Mobility Status/ Medical Equipment: Independent  Other information to know about me:    Health Maintenance  Health Maintenance Reviewed:      My Access Plan  Medical Emergency 911   Primary Clinic Line [facility]- [phone]   24 Hour Appointment Line [phone] or  [phone]) (toll-free)   24 Hour Nurse Line [phone] (toll-free)   Preferred Urgent Care [facility], [phone]   [facility]  [phone]   Preferred Pharmacy [facility] [place], [place] - [address]     Behavioral Health Crisis Line The National Suicide Prevention Lifeline at 1-987.508.6442 or 911     My Care Team Members  Patient Care Team       Relationship Specialty Notifications Start End    Cathryn, Tre Alvarado MD PCP - General Internal Medicine  12/14/15     Phone: [phone] Fax: [phone]         [address] [facility] [place] [address]    Joey Ovalle MD  MD Family Medicine - Sports Medicine  9/10/15     Phone: [phone] Fax: [phone]         [address] [facility] [address]    Fransico Toussaint MD MD Orthopaedic Surgery  9/10/15     Phone: [phone] Fax: [phone]         [address] [facility] [address]         My Care Plans  Self Management and Treatment Plan  Goals and (Comments)  Goal #1: I will complete my health care directive.     Action Plans on File: None  Advance Care Plans/Directives Type:        My Medical and Care Information  Problem List   Patient Active Problem List   Diagnosis     ACP (advance care planning)     Hypertension goal BP (blood pressure) < 150/90     Hypothyroid     Diffuse idiopathic pulmonary fibrosis (H)     Macular degeneration, left eye     Irritable bowel syndrome     Encounter for palliative care     Adjustment disorder with anxious mood     Mild anemia     DDD (degenerative disc disease), lumbar     CKD (chronic kidney disease) stage 3, GFR 30-59 ml/min     History of blood transfusion     Aftercare following surgery     S/P lumbar laminectomy     High risk medication use     Osteopenia     Atrophic vaginitis     Fecal incontinence     Female stress incontinence     Impingement syndrome of both shoulders     UIP (usual interstitial pneumonitis) (H)     High risk medications (not anticoagulants) long-term use     Heart failure with preserved ejection fraction (H)     Congestive heart failure with preserved LV function, NYHA class 3 (H)     Other specified hypothyroidism     Rheumatoid arthritis involving multiple sites with positive rheumatoid factor (H)     Health Care Home     Sick sinus syndrome (H)     Cardiac pacemaker - Medtronic dual lead pacemaker - Not Dependent - MRI Safe      Current Medications and Allergies:  See printed Medication Report.    Care Coordination Start Date: 04/06/16   Frequency of Care Coordination: as needed   Form Last Updated: 09/25/2017

## 2017-08-31 NOTE — PROGRESS NOTES
Clinic Care Coordination Contact  Care Team Conversations    Pt called RN CC requesting clarification on medications that were changed yesterday during her clinic visit.  Per clinic visit summary pt is to take:    These medicines have changed or have updated prescriptions.       Dose/Directions     LOSARTAN POTASSIUM PO   This may have changed:  Another medication with the same name was removed. Continue taking this medication, and follow the directions you see here.   Changed by:  Tre Lockett MD          Dose:  25 mg   Take 25 mg by mouth daily (with dinner) Hold if SBP < 110.   Refills:  0         metoprolol 25 MG 24 hr tablet   Commonly known as:  TOPROL-XL   This may have changed:    - how much to take  - when to take this   Used for:  Chronic atrial fibrillation (H)   Changed by:  Tre Lockett MD         Reviewed with patient.   Patient states she took 50 mg of her Losartan dose last night and 12.5 mg of Metoprolol dose this morning.  Patient asking if she should take an additional dose of her metoprolol.  Pt states her BP was 127/63, Sats 98% and HR 78.  She notes she is short of breath but feels that she is less short of breath than how she felt yesterday.  Pt stated she is experiencing chest pain, increased pressure and in her left shoulder.   Given pt c/o of chest pain.  Pt instructed to seek care in the ED.  Pt in agreement.  Pt states her daughter is on her way.   Updated Stacey Perez RN CC Cardiology as pt had indicated she had spoken with Stacey this morning and indicated she was feeling better.    Will update Dr. Lockett.    Swathi Brito, RN BSN, PHN RN Care Coordinator  [facility] [place]  [email or web address]  [phone]  8/31/2017 11:28 AM

## 2017-08-31 NOTE — TELEPHONE ENCOUNTER
Patient calling this morning to state she is doing much better today. She believes the problem was that she forgot to take her medications the evening of 8/29. States her pulse is around 70 and coming down. She also said that Dr Lockett told her she could go back to taking losartan 50 mg daily. Encouraged her to continue to monitor her condition and call if needed. Reminded to take her medications as prescribed. Will change losartan dose in chart.

## 2017-09-01 ENCOUNTER — CARE COORDINATION (OUTPATIENT)
Dept: CARE COORDINATION | Facility: CLINIC | Age: 82
End: 2017-09-01

## 2017-09-01 NOTE — PROGRESS NOTES
Take Losartan 25 mg once a day.    I am proposing to change her Toprol XL 25 mg to a different beta-blocker, Propranolol.    The reason is that Propranolol is also beneficial for anxiety (instead of taking separate anxiolytic medications).    Inform patient if she is willing to change beta-blockers.

## 2017-09-01 NOTE — PROGRESS NOTES
"Clinic Care Coordination Contact  Care Team Conversations    Patient was seen at [facility] ED 8/31/17 for chest pain.  Patient states \"everything checked out fine\" and she was told she was experiencing anxiety.    Patient states her daughter Xi is over setting up her medications for her and that she is increasing her Toprol XL to 25mg daily as instructed and losartan is staying the same at 25mg daily.  Note medication list states losartan 50mg daily, will send to PCP to clarify.  Patient states her daughter Xi will be calling her every day at 5:30 pm to remind her to take her evening pills.    Patient reports since stopping omeprazole her loose stools and abdominal discomfort have resolved.    To PCP to clarify if patient is to be taking losartan 25 mg or 50 mg daily.    Melissa Behl BSN, RN, N  [facility] Care Coordinator  [phone]  [email or web address]      "

## 2017-09-06 NOTE — PROGRESS NOTES
"RN CC informed patient of Dr. Lockett's message below.  Patient states she would like to stay on her current medication regimen, \"It was going so well before I goofed up.\"  Patient states she wishes to stay on Torprol XL 25mg daily, \"If that's okay with Dr. Lockett,\" and that she is only anxious when she has breathing difficulties/shortness of breath.    Melissa Behl BSN, RN, N  [facility] Care Coordinator  [phone]  [email or web address]    "

## 2017-09-08 DIAGNOSIS — F41.1 GAD (GENERALIZED ANXIETY DISORDER): Primary | ICD-10-CM

## 2017-09-08 RX ORDER — PROPRANOLOL HYDROCHLORIDE 40 MG/1
40 TABLET ORAL 2 TIMES DAILY PRN
Qty: 60 TABLET | Refills: 5 | Status: SHIPPED | OUTPATIENT
Start: 2017-09-08 | End: 2017-09-25

## 2017-09-08 NOTE — PROGRESS NOTES
I agree to continue Toprol XL since it is better suited for atrial fibrillation.  However, still consider Propranolol as needed (40 mg twice BID PRN) if she develops anxiety.

## 2017-09-08 NOTE — PROGRESS NOTES
This writer attempted to contact Linda on 09/08/17      Reason for call relay message and left message to return call.      If patient calls back:   Please see if care coordinator Sena is available to take call. Otherwise take message and RN team will call back when able.        Jacquelyn Schulz RN

## 2017-09-08 NOTE — PROGRESS NOTES
RN CC informed patient of Dr. Lockett's message below.  Patient would like Dr. Lockett to know that she is feeling so much better since returning to her regular medication schedule.  Patient states she is agreeable to trying propanolol 40mg 2 times daily as needed as suggested by Dr. Lockett.  She would like this sent to the [facility].    RN CC will plan on following up with patient in 1 month and will remain available to patient and care team as needed.    Melissa Behl BSN, RN, PHN  [facility] Care Coordinator  [phone]  [email or web address]

## 2017-09-08 NOTE — PROGRESS NOTES
Clinic Care Coordination Contact  [facility]/Voicemail    Referral Source: Self-patient/Caregiver  Clinical Data: Care Coordinator Outreach  Outreach attempted x 1.  Left message with spouse for patient to return writer's phone call.  Plan: Care Coordinator mailed out care coordination introduction letter on 4/6/16. Care Coordinator will try to reach patient again in 3-5 business days.    Melissa Behl BSN, RN, PHN  [facility] Care Coordinator  [phone]  [email or web address]

## 2017-09-10 NOTE — PROGRESS NOTES
CARDIOLOGY CLINIC FOLLOW UP    Referring Provider:  Established Patient  Primary Care Provider:   Marianne Basurto    HPI: Linda Spicer is a 86 year old female who returns to the cardiology clinic for ongoing evaluation of HFpEF and recent paroxysmal atrial flutter.  The patient's risk factor profile is: (+) HTN, (-) diabetes, (-) hyperlipidemia, (-) tobacco use, (+) family Hx CAD [mother, sister]. The patient was diagnosed with HFpEF in Oct 2014.  She had a dobutamine ECHO (Oct 2014) that was normal.  She had a RHC in Oct 2014 that showed normal hemodynamics at baseline although the CO was mildly depressed.  With fluid challenge, the left sided filling pressures dramatically increased. The results of her prior dobutamine ECHO and RHC are noted below; she has not undergone coronary angiography.    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 She had PFTs in Aug 2015 that showed moderate lung diffusion defect.  She did not desaturate after walking 6 minutes.  Inhalers have not provided any benefit.   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  She has been recuperating gradually.    She presented in August 2016 with chest pain, dizziness, and lightheadedness.  She was in atrial flutter at that time but converted back to NSR while in the ER.  She was started on metoprolol XL 12.5 mg daily, losartan was decreased to 50 mg daily, and spironolactone 25 mg qd was continued.  Her ECG did not show ischemic changes and her troponins were negative.  She was treated on heparin but anticoagulation was deferred and she was given ASA 81 mg qd.  She was switched from ASA to Eliquis in Nov 2016.    In Feb 2017 she was started to have recurrent atrial flutter.  She was treated with higher dose of beta blocker but developed bradycardia and near syncope and her beta blocker was stopped.   She returned to [facility] with tachycardia and a pacemaker was placed and she was restarted on Toprol XL 12.5 mg qd.  Her losartan was decreased to 25 mg qd and her spironolactone was continued at 25 mg qd.    She was seen at [facility] ER in July 2017 with chest pain.  It had been occurring in the setting of resuming an exercise program and the left sided chest pain was attributed to musculoskeletal pain. ECG showed atrial pacing.    .  Troponin < 0.045. CXR negative.    She returned to the ER in Aug 2017 after mixing up her medications.  Her Toprol XL had been increased to 25 mg qd but she had accidentally taken Losartan 50 mg qd instead of 25 mg qd.  She noticed HR had increased to 100-110 and she had lightheadedness.  She was returned to Toprol XL 12.5 mg qd and Losartan 25 mg qHS, and Spironolactone 25 mg qd.    She has been feeling well for the past week.  She denies chest pain.  She continues to experience SOB, unchanged.  She has not had recurrent bronchiectasis and is off on ABX.  he is not O2 dependent.  She experiences RUSSELL with a flight of steps or walking a block.  At slow pace she can walk a mile.  She denies pedal edema, PND and orthopnea.  She denies palpitations, lightheadedness, and syncope.      She remains compliant with her medications.    Her BPs are borderline elevated 125-145/50-65 mm Hg.  Her HR is in the 60-80 range.    PAST MEDICAL HISTORY:  Past Medical History:   Diagnosis Date     Adjustment disorder with anxious mood 5/18/2015     Advanced directives, counseling/discussion 8/30/2012    Patient states has Advance Directive and will bring in a copy to clinic. 8/30/2012   Tevin May  [facility] Medical Assistant \       Anemia 9/25/2015     Basal cell cancer      CHF (congestive heart failure) (H) 9/18/2014     CKD (chronic kidney disease) stage 3, GFR 30-59 ml/min 9/29/2015     DDD (degenerative disc disease), lumbar 9/25/2015     Diffuse idiopathic pulmonary fibrosis (H) 5/6/2013      Encounter for palliative care 5/18/2015     History of blood transfusion 9/29/2015     Hypertension goal BP (blood pressure) < 140/90 9/7/2012     Hypothyroid 9/7/2012     Irritable bowel syndrome 10/29/2013     Macular degeneration      Macular degeneration, 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     Nondisplaced spiral fracture of shaft of humerus      Osteoporosis 8/13/2013     [employer] Update utility     RA (rheumatoid arthritis) (H) 5/7/2013     Rheumatic fever      Shingles      Spinal stenosis of lumbar region with neurogenic claudication 9/14/2015       CURRENT MEDICATIONS:  Current Outpatient Prescriptions   Medication Sig Dispense Refill     propranolol (INDERAL) 40 MG tablet Take 1 tablet (40 mg) by mouth 2 times daily as needed 60 tablet 5     metoprolol (TOPROL-XL) 25 MG 24 hr tablet Take 1 tablet (25 mg) by mouth every morning (Patient taking differently: Take 12.5 mg by mouth every morning ) 90 tablet 3     LOSARTAN POTASSIUM PO Take 25 mg by mouth daily (with dinner) Hold if SBP < 110.        azaTHIOprine (IMURAN) 50 MG tablet Take 1 tablet (50 mg) by mouth daily 90 tablet 1     ranitidine (ZANTAC) 150 MG tablet Take 1 tablet (150 mg) by mouth 2 times daily 60 tablet 5     cholestyramine (QUESTRAN) 4 G Packet Take 1 packet (4 g) by mouth 2 times daily (with meals) 60 each 5     IBANdronate (BONIVA) 150 MG tablet Take 1 tablet (150 mg) by mouth every 30 days Take 60 minutes before am meal with 8 oz. water. Remain upright for 30 minutes. 1 tablet 11     levothyroxine (SYNTHROID/LEVOTHROID) 75 MCG tablet TAKE ONE TABLET BY MOUTH EVERY DAY 90 tablet 1     folic acid (FOLVITE) 1 MG tablet Take 1 tablet (1 mg) by mouth daily 100 tablet 1     bismuth subsalicylate 262 MG TABS Take 1 tablet by mouth every 4 hours as needed 80 tablet 1     MetroNIDAZOLE (FLAGYL PO) Take 500 mg by mouth 2 times daily        ciprofloxacin (CIPRO) 250 MG tablet Take 1 tablet (250 mg) by mouth daily 90 tablet 3     SPIRONOLACTONE PO Take 25 mg by mouth  every morning Hold if SBP is less than 120.       apixaban ANTICOAGULANT (ELIQUIS) 2.5 MG tablet Take 1 tablet (2.5 mg) by mouth 2 times daily 60 tablet 11     senna-docusate (SENOKOT-S;PERICOLACE) 8.6-50 MG per tablet Take 2 tablets by mouth At Bedtime Hold if diarrhea occurs. 120 tablet 11     Cranberry 180 MG CAPS Take 1 capsule by mouth daily Unsure of strength       Carboxymethylcellulose Sodium 1 % GEL 1-2 drops (aka Genteal)       saccharomyces boulardii (FLORASTOR) 250 MG capsule Take 250 mg by mouth       triamcinolone (KENALOG) 0.1 % cream Apply sparingly to affected area on face three times daily. 453.6 g 5     Multiple Vitamins-Minerals (PRESERVISION AREDS PO) Take 2 tablets by mouth daily       Blood Pressure Monitor KIT 1 kit daily as needed 1 kit 0     Ranibizumab (LUCENTIS IO) 1 injection every 4 Weeks       cetirizine (ZYRTEC ALLERGY) 10 MG tablet Take 10 mg by mouth At Bedtime       Hypromellose (GENTEAL MILD OP) Apply  to eye daily.       Artificial Tear Ointment (REFRESH P.M.) OINT Apply  to eye. Daily at bedtime          calcium-vitamin D (CALTRATE) 600-400 MG-UNIT per tablet Take 1 tablet by mouth 2 times daily        fish oil-omega-3 fatty acids (FISH OIL) 1000 MG capsule Take 2 g by mouth daily. 2 capsules daily            order for DME Equipment being ordered: Left wrist splint. (Patient not taking: Reported on 9/11/2017) 1 each 0     order for DME Equipment being ordered: Left wrist splint. (Patient not taking: Reported on 9/11/2017) 1 each 0     order for DME Equipment being ordered: Dispense baffle, for use with nebulizer. (Patient not taking: Reported on 9/11/2017) 1 each 0     order for DME Equipment being ordered: Nebulizer. Use with Albuterol. (Patient not taking: Reported on 9/11/2017) 1 each 0     order for DME Equipment being ordered: Dispense face mask.  Mrs. Spicer is immunosuppressed due to rheumatoid arthritis. (Patient not taking: Reported on 9/11/2017) 1 Box 11      nitroglycerin (NITROSTAT) 0.4 MG SL tablet Place 1 tablet (0.4 mg) under the tongue every 5 minutes as needed for chest pain (Patient not taking: Reported on 9/11/2017) 25 tablet 0       PAST SURGICAL HISTORY:  Past Surgical History:   Procedure Laterality Date     APPENDECTOMY       BIOPSY      hemorrhoidectomy     ENT SURGERY      tonsillectomy     GYN SURGERY      3 D & C's     HYSTERECTOMY, PAP NO LONGER INDICATED       LAMINECTOMY LUMBAR ONE LEVEL N/A 10/13/2015    Procedure: LAMINECTOMY LUMBAR ONE LEVEL;  Surgeon: Fransico Toussaint MD;  Location: [facility] OR       ALLERGIES  Cephalexin hcl; Gabapentin; Naproxen; Perfume; Lactase; Macrobid [nitrofurantoin anhydrous]; Sulfa drugs; and Xanax [alprazolam]    FAMILY HX:  Family History   Problem Relation Age of Onset     Hypertension Mother      Psychotic Disorder Father      DIABETES Son      DIABETES Daughter      Blood Disease Daughter        SOCIAL HX:  History     Social History     Marital Status:      Spouse Name: N/A     Number of Children: N/A     Years of Education: N/A     Social History Main Topics     Smoking status: Never Smoker      Smokeless tobacco: Never Used     Alcohol Use: Yes      Comment: rare wine      Drug Use: No     Sexual Activity: No     Other Topics Concern     None     Social History Narrative    . Has six children. She enjoys [employer] and [employer]. Her  has cancer. Her son has financial and alcohol issues.       ROS:  Constitutional: No fever, chills, or sweats. No weight gain/loss.   HEENT: No visual disturbance, ear ache, epistaxis, sore throat.   Allergies/Immunologic: Negative.   Respiratory:(+) cough productive of clear sputum, (-) hemoptysis.   Cardiovascular: As per HPI.   GI: No nausea, vomiting, hematemesis, melena, or hematochezia.   : No urinary frequency, dysuria, or hematuria.   Integument: No rash.   Psychiatric: No anxiety / depression.   Neuro: No speech disturbance, focal sensory or motor  deficit.   Endocrinology: No polyuria / polyphagia.   Musculoskeletal: No myalgia.    VITAL SIGNS:  /67 (BP Location: Left arm, Patient Position: Chair, Cuff Size: Adult Regular)  Pulse 61  Wt 71.8 kg (158 lb 6.4 oz)  SpO2 99%  BMI 28.06 kg/m2  Body mass index is 28.06 kg/(m^2).  Wt Readings from Last 2 Encounters:   17 71.8 kg (158 lb 6.4 oz)   17 71.2 kg (157 lb)       PHYSICAL EXAM  Linda Spicer is a 84 year old female.in no acute distress.  HEENT: Eyes Nonicteric.  Neck: JVP normal.  Carotids +3/3 bilaterally without bruits.  Lungs: Bibasilar crackles about 1/3 the way up, unchanged.  Cor: RRR. Normal S1 and S2.  No murmur, rub, or gallop.  PMI in Lf 5th ICS.  Abd: Soft, nontender, nondistended.  NABS.  No pulsatile mass.  Extremities: No C/C.  Trace edema.  Pulses +3/3 symmetric in upper and lower extremities.  Neuro: Grossly intact.  Psych: A&O x 3.  Skin: No rash.    LABS  None    Recent Labs   Lab Test  17   1214  16   0756  14   0821   CHOL  146  171  155   HDL  50  47*  42*   LDL  76  109*  97   TRIG  101  75  84   CHOLHDLRATIO   --    --   3.7       EK16  SB at 54.  Intervals: 0.17/0.076/0.404.  Isolated Q wave in III.  No other abnormalities.    EK/7/15  SB at 49.  Q waves in III and aVF.  No change from ECG (2012) by report but I am not able to locate this or any other ECGs.    ECHO: None    DOBUTAMINE ECHO STRESS TEST:  14  Interpretation Summary  The EKG portion of this stress test was negative for inducible ischemia (see echo results below). Normal resting wall motion and no stress-induced wall motion abnormality.    Baseline  Normal baseline electrocardiogram.  Normal left ventricular wall motion.    Stress  The maximum dose of dobutamine was 20mcg/kg/min.  Target Heart Rate was achieved.  The EKG portion of this stress test was negative for inducible ischemia (see   echo results below).  Arrhythmia induced during stress: occasional  PVC's and PAC's.  Normal resting wall motion and no stress-induced wall motion abnormality.    Left Ventricle  There is mild concentric left ventricular hypertrophy.  Left ventricular systolic function is normal.    Right Ventricle  The right ventricle is normal in size and function.    Atria  The left atrium is moderate to severely dilated.    Mitral Valve  There is mild (1+) mitral regurgitation.    Tricuspid Valve  There is mild (1+) tricuspid regurgitation.  The right ventricular systolic pressure is approximated at 39 mmHg plus the   right atrial pressure.    Aortic Valve  There is trace aortic regurgitation.    Pericardial/Pleural  There is no pericardial effusion.      RIGHT HEART CATH: 9/22/14  RA 3  RV 36/3  PA 36/13 (mean 21)  PCW 10  Fluid challenge 500 ml NS --> PA 46/18 (mean 27 mm Hg), PCW 18 with prominent v waves    PFTs  (8/21/15)  The six-minute walk distance is normal.  There is no significant desaturation during the six-minute walk done on room air.  No significant airflow obstruction.  Moderate Diffusion Defect   Compared with testing from 1/15/2015 there has been an increase in the FVC.    CORONARY ANGIOGRAPHY:  None    ASSESSMENT AND PLAN:   1. Heart Failure, preserved LVEF.  The patient has significant RUSSELL, occurring with minimal activities. I attribute this to her underlying pulmonary fibrosis a recent pneumonia superimposed.  She is not volume overloaded on exam.  Continue Losartan 25 mg qd and Aldactone 25 mg qd and Toprol XL 12.5 mg qd.  We will monitor for worsening pulmonary symptoms on the beta blocker  Continue with light exercise. Avoid added salt and processed foods.    2. Atrial Flutter, Paroxysmal.  I would equate paroxysmal atrial flutter to PAF in regard to risk of stroke or systemic embolism.   The patient has a CHADS2-Vasc score of 4, corresponding to a 4.0% annual stroke / systemic [facility] event rate. The patient has Stage III CKD with GFR 39 - 42 ml/min.  She is on Eliquis  2.5 mg BID given the borderline Cr and the age > 80 yrs.  She has concerns over the interaction of coumadin with her food. Pacemaker interrogation per protocol, at [place].    3. Rheumatoid Pulmonary Fibrosis.  Per Dr. Comer, she had severe dyspnea and hyperventilation in the past now showing significant improvement in ventilatory control and symptoms and improvement in pulmonary function and exercise tolerance.  There was a concern that patient was destaturating but she did not do so on 6 minute walk test.  She is up to date on vaccinations and flu shots.  F/U with Dr. Comer prn.    4. Hypertension.  Well controlled on combination of aldactone, Toprol, and Losartan.  Restrict Na.  No new changes.    FOLLOW UP:  6 months      Emeterio Loza MD    Divisions of Cardiology  [place], [place]    CC  ESTABLISHED PATIENT

## 2017-09-11 ENCOUNTER — OFFICE VISIT (OUTPATIENT)
Dept: CARDIOLOGY | Facility: CLINIC | Age: 82
End: 2017-09-11
Payer: MEDICARE

## 2017-09-11 VITALS
SYSTOLIC BLOOD PRESSURE: 138 MMHG | WEIGHT: 158.4 LBS | BODY MASS INDEX: 28.06 KG/M2 | OXYGEN SATURATION: 99 % | DIASTOLIC BLOOD PRESSURE: 67 MMHG | HEART RATE: 61 BPM

## 2017-09-11 DIAGNOSIS — I50.30 HEART FAILURE WITH PRESERVED EJECTION FRACTION (H): Primary | ICD-10-CM

## 2017-09-11 PROCEDURE — 99214 OFFICE O/P EST MOD 30 MIN: CPT | Performed by: INTERNAL MEDICINE

## 2017-09-11 ASSESSMENT — PAIN SCALES - GENERAL: PAINLEVEL: NO PAIN (0)

## 2017-09-11 NOTE — MR AVS SNAPSHOT
After Visit Summary   9/11/2017    Linda Spiecr    MRN: [id number]           Patient Information     Date Of Birth          6/13/1931        Visit Information        Provider Department      9/11/2017 1:10 PM Emeterio Loza MD [facility]        Care Instructions    It was a pleasure to see you in the cardiology clinic today.    If you have any questions, you can reach my nurse, Stacey Perez, at [phone].     Note the new medications: None  Stop the following medications: None    The results from today include: None    Tests ordered today: None    I would like you to follow up with Dr. Loza in 6 months.    Sincerely,      Emeterio Loza MD     [facility]              Follow-ups after your visit        Follow-up notes from your care team     Return in about 6 months (around 3/11/2018).      Your next 10 appointments already scheduled     Sep 15, 2017  9:30 AM CDT   Level 2 with BAY 1 INFUSION   [facility]    [address] [place] [id number] [phone]            Oct 04, 2017  4:30 PM CDT   Return Visit with DEVICE CHECK RN CARD   [facility]    [address] [place] [id number] [phone]            Oct 13, 2017  9:30 AM CDT   Level 2 with BAY 4 INFUSION   [facility]    [address] [place] [id number] [phone]            Nov 07, 2017  9:20 AM CST   Return Visit with Mario Davenport MD   [facility] ([facility]    [address] [facility] [id number] [phone]              Who to contact     If you have questions or need follow up information about today's clinic visit or your schedule please contact [facility] directly at [phone].  Normal or non-critical  lab and imaging results will be communicated to you by [employer], letter or phone within 4 business days after the clinic has received the results. If you do not hear from us within 7 days, please contact the clinic through [employer] or phone. If you have a critical or abnormal lab result, we will notify you by phone as soon as possible.  Submit refill requests through [employer] or call your pharmacy and they will forward the refill request to us. Please allow 3 business days for your refill to be completed.          Additional Information About Your Visit        [employer] Information     [employer] gives you secure access to your electronic health record. If you see a primary care provider, you can also send messages to your care team and make appointments. If you have questions, please call your primary care clinic.  If you do not have a primary care provider, please call [phone] and they will assist you.      [employer] is an electronic gateway that provides easy, online access to your medical records. With [employer], you can request a clinic appointment, read your test results, renew a prescription or communicate with your care team.     To access your existing account, please contact your [facility] or call [phone] for assistance.        Care EveryWhere ID     This is your Care EveryWhere ID. This could be used by other organizations to access your [facility] medical records  [id number]        Your Vitals Were     Pulse Pulse Oximetry BMI (Body Mass Index)             61 99% 28.06 kg/m2          Blood Pressure from Last 3 Encounters:   09/11/17 138/67   08/30/17 120/62   08/18/17 164/66    Weight from Last 3 Encounters:   09/11/17 71.8 kg (158 lb 6.4 oz)   08/30/17 71.2 kg (157 lb)   08/18/17 71.9 kg (158 lb 8 oz)              Today, you had the following     No orders found for display         Today's Medication Changes          These changes are accurate as of: 9/11/17  1:39 PM.  If  you have any questions, ask your nurse or doctor.               These medicines have changed or have updated prescriptions.        Dose/Directions    metoprolol 25 MG 24 hr tablet   Commonly known as:  TOPROL-XL   This may have changed:  how much to take   Used for:  Chronic atrial fibrillation (H)        Dose:  25 mg   Take 1 tablet (25 mg) by mouth every morning   Quantity:  90 tablet   Refills:  3                Primary Care Provider Office Phone # Fax #    Tre Lockett [phone]       [address] [place]  [facility] [address]        Equal Access to Services     [facility]: Hadii aad ku hadasho Soomaali, waaxda luqadaha, qaybta kaalmada adeegyada, waxay marisabel moss . So [facility] [phone].    ATENCIÓN: Si habla español, tiene a merchant disposición servicios gratuitos de asistencia lingüística. [facility] [phone].    We comply with applicable federal civil rights laws and [place] laws. We do not discriminate on the basis of race, color, national origin, age, disability sex, sexual orientation or gender identity.            Thank you!     Thank you for choosing [facility]  for your care. Our goal is always to provide you with excellent care. Hearing back from our patients is one way we can continue to improve our services. Please take a few minutes to complete the written survey that you may receive in the mail after your visit with us. Thank you!             Your Updated Medication List - Protect others around you: Learn how to safely use, store and throw away your medicines at www.disposemymeds.org.          This list is accurate as of: 9/11/17  1:39 PM.  Always use your most recent med list.                   Brand Name Dispense Instructions for use Diagnosis    apixaban ANTICOAGULANT 2.5 MG tablet    ELIQUIS    60 tablet    Take 1 tablet (2.5 mg) by mouth 2 times daily    H/O atrial flutter       azaTHIOprine 50 MG tablet    IMURAN    90  tablet    Take 1 tablet (50 mg) by mouth daily    Rheumatoid arthritis involving multiple sites with positive rheumatoid factor (H), High risk medication use       bismuth subsalicylate 262 MG Tabs     80 tablet    Take 1 tablet by mouth every 4 hours as needed    Acute infectious diarrhea, Enteritis due to Norovirus       Blood Pressure Monitor Kit     1 kit    1 kit daily as needed    CHF (congestive heart failure) (H)       calcium-vitamin D 600-400 MG-UNIT per tablet    CALTRATE     Take 1 tablet by mouth 2 times daily        Carboxymethylcellulose Sodium 1 % Gel      1-2 drops (aka Genteal)        cholestyramine 4 G Packet    QUESTRAN    60 each    Take 1 packet (4 g) by mouth 2 times daily (with meals)    Acute diarrhea       ciprofloxacin 250 MG tablet    CIPRO    90 tablet    Take 1 tablet (250 mg) by mouth daily    Chronic UTI       Cranberry 180 MG Caps      Take 1 capsule by mouth daily Unsure of strength        fish oil-omega-3 fatty acids 1000 MG capsule      Take 2 g by mouth daily. 2 capsules daily        FLAGYL PO      Take 500 mg by mouth 2 times daily    Acute infectious diarrhea       folic acid 1 MG tablet    FOLVITE    100 tablet    Take 1 tablet (1 mg) by mouth daily    Oral aphthae       GENTEAL MILD OP      Apply  to eye daily.        IBANdronate 150 MG tablet    BONIVA    1 tablet    Take 1 tablet (150 mg) by mouth every 30 days Take 60 minutes before am meal with 8 oz. water. Remain upright for 30 minutes.    Osteopenia       levothyroxine 75 MCG tablet    SYNTHROID/LEVOTHROID    90 tablet    TAKE ONE TABLET BY MOUTH EVERY DAY    Hypothyroidism, unspecified type       LOSARTAN POTASSIUM PO      Take 25 mg by mouth daily (with dinner) Hold if SBP < 110.        LUCENTIS IO      1 injection every 4 Weeks        metoprolol 25 MG 24 hr tablet    TOPROL-XL    90 tablet    Take 1 tablet (25 mg) by mouth every morning    Chronic atrial fibrillation (H)       nitroGLYcerin 0.4 MG sublingual tablet     NITROSTAT    25 tablet    Place 1 tablet (0.4 mg) under the tongue every 5 minutes as needed for chest pain    Chest pain       * order for DME     1 Box    Equipment being ordered: Dispense face mask. Mrs. Spicer is immunosuppressed due to rheumatoid arthritis.    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 Use with Albuterol.    Hypoxia       order for DME     1 each    Equipment being ordered: Dispense baffle, for use with nebulizer.    Pneumonia of left upper lobe due to Mycoplasma pneumoniae       * order for DME     1 each    Equipment being ordered: Left wrist splint.    Injury of left hand, initial encounter       * order for DME     1 each    Equipment being ordered: Left wrist splint.    Left wrist injury, initial encounter       PRESERVISION AREDS PO      Take 2 tablets by mouth daily        propranolol 40 MG tablet    INDERAL    60 tablet    Take 1 tablet (40 mg) by mouth 2 times daily as needed    HANS (generalized anxiety disorder)       ranitidine 150 MG tablet    ZANTAC    60 tablet    Take 1 tablet (150 mg) by mouth 2 times daily    Gastroesophageal reflux disease without esophagitis       REFRESH P.M. Oint      Apply  to eye. Daily at bedtime        saccharomyces boulardii 250 MG capsule    FLORASTOR     Take 250 mg by mouth        senna-docusate 8.6-50 MG per tablet    SENOKOT-S;PERICOLACE    120 tablet    Take 2 tablets by mouth At Bedtime Hold if diarrhea occurs.    Constipation, chronic       SPIRONOLACTONE PO      Take 25 mg by mouth every morning Hold if SBP is less than 120.    Congestive heart failure with preserved LV function, NYHA class 3 (H)       triamcinolone 0.1 % cream    KENALOG    453.6 g    Apply sparingly to affected area on face three times daily.    Facial lesion       ZYRTEC ALLERGY 10 MG tablet   Generic drug:  cetirizine      Take 10 mg by mouth At Bedtime        *  Notice:  This list has 4 medication(s) that are the same as other medications prescribed for you. Read the directions carefully, and ask your doctor or other care provider to review them with you.

## 2017-09-11 NOTE — PATIENT INSTRUCTIONS
It was a pleasure to see you in the cardiology clinic today.    If you have any questions, you can reach my nurse, Stacey Perez, at [phone].     Note the new medications: None  Stop the following medications: None    The results from today include: None    Tests ordered today: None    I would like you to follow up with Dr. Loza in 6 months.    Sincerely,      Emeterio Loza MD     [facility]

## 2017-09-11 NOTE — NURSING NOTE
"Linda Spicer's goals for this visit include:   Chief Complaint   Patient presents with     RECHECK     Follow up       She requests these members of her care team be copied on today's visit information: PCP    PCP: Tre Lockett    Referring Provider:  No referring provider defined for this encounter.    Chief Complaint   Patient presents with     RECHECK     Follow up       Initial /67 (BP Location: Left arm, Patient Position: Chair, Cuff Size: Adult Regular)  Pulse 61  Wt 71.8 kg (158 lb 6.4 oz)  SpO2 99%  BMI 28.06 kg/m2 Estimated body mass index is 28.06 kg/(m^2) as calculated from the following:    Height as of 8/30/17: 1.6 m (5' 3\").    Weight as of this encounter: 71.8 kg (158 lb 6.4 oz).  Medication Reconciliation: complete         Medication Refills: none        Jaz Dumas CMA        "

## 2017-09-13 ENCOUNTER — CARE COORDINATION (OUTPATIENT)
Dept: CARE COORDINATION | Facility: CLINIC | Age: 82
End: 2017-09-13

## 2017-09-13 NOTE — PROGRESS NOTES
Noted. Patient should go to Urgent Care for removal of staples.  Hold Eliquis x 48 hours prior to procedure and recent 48 hours after procedure.

## 2017-09-13 NOTE — PROGRESS NOTES
"Clinic Care Coordination Contact  Care Team Conversations    RN CC received a call from patient with the following questions:    1. Patient will have a basal cell cancer removed from her nose on 10/24/17 and patient states the dermatologist is inquiring if patient can hold her Eliquis, and if so for how long prior to the procedure.    2. Patient reports a fall on 9/11/17 while walking on the paths outside her apartment.  Patient states she fell onto her left site and hit her head.  Patient reports she was evaluated at [facility] for a full work up, had 4 stiches placed on her left forehead and was informed everything else \"checked out.\"  Patient was instructed to have her stitches removed this upcoming weekend.  RN CC informed patient Urgent Care would need to see patient for an office visit to remove the sutures.  Patient elected to schedule a nurse only visit on 9/18/17 for suture removal.    To PCP to advise if patient is to hold Eliquis prior to her upcoming basal cell removal and if a hold is advised, please advise of duration    Melissa Behl BSN, RN, N  [facility] Care Coordinator  [phone]  [email or web address]       "

## 2017-09-14 NOTE — PROGRESS NOTES
RN CC informed patient of PCP's message below.  Patient verbalized understanding and cancelled her nurse only appointment for 9/18/17.  RN CC will follow up with patient in 4 weeks and remain available as needed.    Melissa Behl BSN, RN, N  [facility] Care Coordinator  [phone]  [email or web address]

## 2017-09-15 ENCOUNTER — INFUSION THERAPY VISIT (OUTPATIENT)
Dept: INFUSION THERAPY | Facility: CLINIC | Age: 82
End: 2017-09-15
Payer: MEDICARE

## 2017-09-15 VITALS
WEIGHT: 158.7 LBS | BODY MASS INDEX: 28.11 KG/M2 | OXYGEN SATURATION: 96 % | HEART RATE: 60 BPM | SYSTOLIC BLOOD PRESSURE: 146 MMHG | DIASTOLIC BLOOD PRESSURE: 70 MMHG | TEMPERATURE: 97.1 F | RESPIRATION RATE: 20 BRPM

## 2017-09-15 DIAGNOSIS — M05.79 RHEUMATOID ARTHRITIS INVOLVING MULTIPLE SITES WITH POSITIVE RHEUMATOID FACTOR (H): Primary | ICD-10-CM

## 2017-09-15 PROCEDURE — 99207 ZZC NO CHARGE LOS: CPT

## 2017-09-15 PROCEDURE — 96365 THER/PROPH/DIAG IV INF INIT: CPT | Performed by: INTERNAL MEDICINE

## 2017-09-15 RX ADMIN — Medication 250 ML: at 10:19

## 2017-09-15 ASSESSMENT — PAIN SCALES - GENERAL: PAINLEVEL: NO PAIN (0)

## 2017-09-15 NOTE — PROGRESS NOTES
"Infusion Nursing Note:  Linda KATEY Spicer presents today for Orencia.    Patient seen by provider today: No   present during visit today: Not Applicable.    Note: N/A.    Intravenous Access:  Peripheral IV placed.    Treatment Conditions:  Rheumatology Infusion Checklist: PRIOR TO INFUSION OF BIOLOGICAL MEDICATIONS OR ANY OF THESE AS LISTED: Orencia (abatacept) \".rheumbiologicalchecklist\"    Prior to Infusion of biological medications or any of these as listed:    1. Elevated temperature, fever, chills, productive cough or abnormal vital signs, night sweats, coughing up blood or sputum, no appetite or abnormal vital signs : NO    2. Open wounds or new incisions: YES, small skin tear left outer eye brow from fall. Four sutures in tact. Dry and intact.    3. Recent hospitalization: YES, was seen in ER after fall on 9/11/17.    4.  Recent surgeries:  NO    5. Any upcoming surgeries or dental procedures?:NO    6. Any current or recent bouts of illness or infection? On any antibiotics? : NO    7. Any new, sudden or worsening abdominal pain :NO    8. Vaccination within 4 weeks? Patient or someone in the household is scheduled to receive vaccination? No live virus vaccines prior to or during treatment :NO    9. Any nervous system diseases [i.e. multiple sclerosis, Guillain-[place], seizures, neurological  changes]: NO    10. Pregnant or breast feeding; or plans on pregnancy in the future: NO    11. Signs of worsening depression or suicidal ideations while taking benlysta:NO    12. New-onset medical symptoms: NO    13.  New cancer diagnosis or on chemotherapy or radiation NO    14.  Evaluate for any sign of active TB [Unexplained weight loss, Loss of appetite, Night sweats, Fever, Fatigue, Chills, Coughing for 3 weeks or longer, Hemoptysis (coughing up blood), Chest pain]: NO    **Note: If answered yes to any of the above, hold the infusion and contact ordering rheumatologist or on-call rheumatologist.       Post " Infusion Assessment:  Patient tolerated infusion without incident.  Blood return noted pre and post infusion.  Site patent and intact, free from redness, edema or discomfort.  Access discontinued per protocol.  Rheumatology Post Infusion: POST-INFUSION OF BIOLOGICAL MEDICATION:    Reviewed with patient.  Given biologic medication or medication hand-out. Inform patient if any fever, chills or signs of infection, new symptoms, abdominal pain, heart palpitations, shortness of breath, reaction, weakness, neurological changes, seek medical attention immediately and should not receive infusions. No live virus vaccines prior to or during treatment or up to 6 months post infusion. If the patient has an upcoming procedure or surgery, this should be discussed with the rheumatologist and surgeon or provider.    Discharge Plan:   Patient will return 10/1317 for next appointment.  Patient discharged in stable condition accompanied by: daughter.  Departure Mode: Ambulatory.    Trinity Villalpando RN

## 2017-09-15 NOTE — MR AVS SNAPSHOT
After Visit Summary   9/15/2017    Linda Spicer    MRN: [id number]           Patient Information     Date Of Birth          6/13/1931        Visit Information        Provider Department      9/15/2017 9:30 AM BAY 1 INFUSION [facility]        Today's Diagnoses     Rheumatoid arthritis involving multiple sites with positive rheumatoid factor (H)    -  1       Follow-ups after your visit        Your next 10 appointments already scheduled     Oct 04, 2017  4:30 PM CDT   Return Visit with DEVICE CHECK RN CARD   [facility] ([facility])    [address] [place] [id number] [phone]            Oct 13, 2017  9:30 AM CDT   Level 2 with BAY 4 INFUSION   [facility] ([facility])    [address] [place] [address] [phone]            Nov 07, 2017  9:20 AM CST   Return Visit with Mario Davenport MD   [facility] ([facility])    [address] [place] [id number] [phone]            Nov 10, 2017  9:30 AM CST   Level 2 with BAY 8 INFUSION   [facility] ([facility])    [address] [place] [address] [phone]            Mar 12, 2018  2:10 PM CDT   Return Visit with Emeterio Loza MD   [facility])    [id number] [place] [id number] [phone]              Future tests that were ordered for you today     Open Standing Orders        Priority Remaining Interval Expires Ordered    Notify Physician Routine [id number] PRN  9/15/2017            Who to contact     If you have questions or need follow up information about today's clinic visit or your schedule please contact [facility] directly at [phone].  Normal or non-critical lab and imaging results will be communicated to you by  MyChart, letter or phone within 4 business days after the clinic has received the results. If you do not hear from us within 7 days, please contact the clinic through [employer] or phone. If you have a critical or abnormal lab result, we will notify you by phone as soon as possible.  Submit refill requests through [employer] or call your pharmacy and they will forward the refill request to us. Please allow 3 business days for your refill to be completed.          Additional Information About Your Visit        [employer] [facility] Information     [employer] gives you secure access to your electronic health record. If you see a primary care provider, you can also send messages to your care team and make appointments. If you have questions, please call your primary care clinic.  If you do not have a primary care provider, please call [phone] and they will assist you.      [employer] is an electronic gateway that provides easy, online access to your medical records. With [employer], you can request a clinic appointment, read your test results, renew a prescription or communicate with your care team.     To access your existing account, please contact your [facility] or call [phone] for assistance.        Care EveryWhere ID     This is your Care EveryWhere ID. This could be used by other organizations to access your [facility] medical records  [id number]        Your Vitals Were     Pulse Temperature Respirations Pulse Oximetry BMI (Body Mass Index)       60 97.1  F (36.2  C) (Oral) 20 96% 28.11 kg/m2        Blood Pressure from Last 3 Encounters:   09/15/17 146/70   09/11/17 138/67   08/30/17 120/62    Weight from Last 3 Encounters:   09/15/17 72 kg (158 lb 11.2 oz)   09/11/17 71.8 kg (158 lb 6.4 oz)   08/30/17 71.2 kg (157 lb)              Today, you had the following     No orders found for display         Today's Medication Changes          These changes are accurate as of: 9/15/17  1:24 PM.  If you have  any questions, ask your nurse or doctor.               These medicines have changed or have updated prescriptions.        Dose/Directions    metoprolol 25 MG 24 hr tablet   Commonly known as:  TOPROL-XL   This may have changed:  how much to take   Used for:  Chronic atrial fibrillation (H)        Dose:  25 mg   Take 1 tablet (25 mg) by mouth every morning   Quantity:  90 tablet   Refills:  3                Primary Care Provider Office Phone # Fax #    Tre Lockett [phone] [address] [place]  [facility] [address]        Equal Access to Services     [facility]: Hadii aad ku hadasho Soomaali, waaxda luqadaha, qaybta kaalmada adeegyada, waxay marisabel moss . So [facility] [phone].    ATENCIÓN: Si habla español, tiene a merchant disposición servicios gratuitos de asistencia lingüística. [facility] [phone].    We comply with applicable federal civil rights laws and [place] laws. We do not discriminate on the basis of race, color, national origin, age, disability sex, sexual orientation or gender identity.            Thank you!     Thank you for choosing [facility]  for your care. Our goal is always to provide you with excellent care. Hearing back from our patients is one way we can continue to improve our services. Please take a few minutes to complete the written survey that you may receive in the mail after your visit with us. Thank you!             Your Updated Medication List - Protect others around you: Learn how to safely use, store and throw away your medicines at www.disposemymeds.org.          This list is accurate as of: 9/15/17  1:24 PM.  Always use your most recent med list.                   Brand Name Dispense Instructions for use Diagnosis    apixaban ANTICOAGULANT 2.5 MG tablet    ELIQUIS    60 tablet    Take 1 tablet (2.5 mg) by mouth 2 times daily    H/O atrial flutter       azaTHIOprine 50 MG tablet    IMURAN    90 tablet    Take  1 tablet (50 mg) by mouth daily    Rheumatoid arthritis involving multiple sites with positive rheumatoid factor (H), High risk medication use       bismuth subsalicylate 262 MG Tabs     80 tablet    Take 1 tablet by mouth every 4 hours as needed    Acute infectious diarrhea, Enteritis due to Norovirus       Blood Pressure Monitor Kit     1 kit    1 kit daily as needed    CHF (congestive heart failure) (H)       calcium-vitamin D 600-400 MG-UNIT per tablet    CALTRATE     Take 1 tablet by mouth 2 times daily        Carboxymethylcellulose Sodium 1 % Gel      1-2 drops (aka Genteal)        cholestyramine 4 G Packet    QUESTRAN    60 each    Take 1 packet (4 g) by mouth 2 times daily (with meals)    Acute diarrhea       ciprofloxacin 250 MG tablet    CIPRO    90 tablet    Take 1 tablet (250 mg) by mouth daily    Chronic UTI       Cranberry 180 MG Caps      Take 1 capsule by mouth daily Unsure of strength        fish oil-omega-3 fatty acids 1000 MG capsule      Take 2 g by mouth daily. 2 capsules daily        FLAGYL PO      Take 500 mg by mouth 2 times daily    Acute infectious diarrhea       folic acid 1 MG tablet    FOLVITE    100 tablet    Take 1 tablet (1 mg) by mouth daily    Oral aphthae       GENTEAL MILD OP      Apply  to eye daily.        IBANdronate 150 MG tablet    BONIVA    1 tablet    Take 1 tablet (150 mg) by mouth every 30 days Take 60 minutes before am meal with 8 oz. water. Remain upright for 30 minutes.    Osteopenia       levothyroxine 75 MCG tablet    SYNTHROID/LEVOTHROID    90 tablet    TAKE ONE TABLET BY MOUTH EVERY DAY    Hypothyroidism, unspecified type       LOSARTAN POTASSIUM PO      Take 25 mg by mouth daily (with dinner) Hold if SBP < 110.        LUCENTIS IO      1 injection every 4 Weeks        metoprolol 25 MG 24 hr tablet    TOPROL-XL    90 tablet    Take 1 tablet (25 mg) by mouth every morning    Chronic atrial fibrillation (H)       nitroGLYcerin 0.4 MG sublingual tablet    NITROSTAT     25 tablet    Place 1 tablet (0.4 mg) under the tongue every 5 minutes as needed for chest pain    Chest pain       * order for DME     1 Box    Equipment being ordered: Dispense face mask. Mrs. Spicer is immunosuppressed due to rheumatoid arthritis.    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 Use with Albuterol.    Hypoxia       order for DME     1 each    Equipment being ordered: Dispense baffle, for use with nebulizer.    Pneumonia of left upper lobe due to Mycoplasma pneumoniae       * order for DME     1 each    Equipment being ordered: Left wrist splint.    Injury of left hand, initial encounter       * order for DME     1 each    Equipment being ordered: Left wrist splint.    Left wrist injury, initial encounter       PRESERVISION AREDS PO      Take 2 tablets by mouth daily        propranolol 40 MG tablet    INDERAL    60 tablet    Take 1 tablet (40 mg) by mouth 2 times daily as needed    HANS (generalized anxiety disorder)       ranitidine 150 MG tablet    ZANTAC    60 tablet    Take 1 tablet (150 mg) by mouth 2 times daily    Gastroesophageal reflux disease without esophagitis       REFRESH P.M. Oint      Apply  to eye. Daily at bedtime        saccharomyces boulardii 250 MG capsule    FLORASTOR     Take 250 mg by mouth        senna-docusate 8.6-50 MG per tablet    SENOKOT-S;PERICOLACE    120 tablet    Take 2 tablets by mouth At Bedtime Hold if diarrhea occurs.    Constipation, chronic       SPIRONOLACTONE PO      Take 25 mg by mouth every morning Hold if SBP is less than 120.    Congestive heart failure with preserved LV function, NYHA class 3 (H)       triamcinolone 0.1 % cream    KENALOG    453.6 g    Apply sparingly to affected area on face three times daily.    Facial lesion       ZYRTEC ALLERGY 10 MG tablet   Generic drug:  cetirizine      Take 10 mg by mouth At Bedtime        * Notice:  This list  has 4 medication(s) that are the same as other medications prescribed for you. Read the directions carefully, and ask your doctor or other care provider to review them with you.

## 2017-09-16 ENCOUNTER — OFFICE VISIT (OUTPATIENT)
Dept: URGENT CARE | Facility: URGENT CARE | Age: 82
End: 2017-09-16
Payer: MEDICARE

## 2017-09-16 VITALS
OXYGEN SATURATION: 97 % | BODY MASS INDEX: 27.99 KG/M2 | TEMPERATURE: 98 F | DIASTOLIC BLOOD PRESSURE: 69 MMHG | SYSTOLIC BLOOD PRESSURE: 128 MMHG | HEART RATE: 65 BPM | WEIGHT: 158 LBS

## 2017-09-16 DIAGNOSIS — Z48.02 VISIT FOR SUTURE REMOVAL: Primary | ICD-10-CM

## 2017-09-16 PROCEDURE — 99211 OFF/OP EST MAY X REQ PHY/QHP: CPT | Performed by: FAMILY MEDICINE

## 2017-09-16 NOTE — NURSING NOTE
"Initial /69 (BP Location: Left arm, Patient Position: Chair)  Pulse 65  Temp 98  F (36.7  C) (Oral)  Wt 158 lb (71.7 kg)  SpO2 97%  BMI 27.99 kg/m2 Estimated body mass index is 27.99 kg/(m^2) as calculated from the following:    Height as of 8/30/17: 5' 3\" (1.6 m).    Weight as of this encounter: 158 lb (71.7 kg). .    "

## 2017-09-16 NOTE — PROGRESS NOTES
SUBJECTIVE:   Linda Spicer is a 86 year old female who presents to clinic today for the following health issues:      Linda presents to the clinic today for  removal of sutures.      The patient has had the sutures in place for 6 days.        There has been no history of infection or drainage.      O: 4 sutures are seen located on the left upper eyelid.  The wound is healing well with no signs of infection.      Tetanus status is up to date.      A: Suture removal.      P:  All sutures were easily removed today.  Routine wound care discussed.  The patient will follow up as needed.        Russell Melgar MD  [facility]

## 2017-09-16 NOTE — MR AVS SNAPSHOT
After Visit Summary   9/16/2017    Linda Spicer    MRN: [id number]           Patient Information     Date Of Birth          6/13/1931        Visit Information        Provider Department      9/16/2017 9:40 AM Russell Melgar MD [facility]        Today's Diagnoses     Visit for suture removal    -  1       Follow-ups after your visit        Your next 10 appointments already scheduled     Oct 04, 2017  4:30 PM CDT   Return Visit with DEVICE CHECK RN CARD   [facility] ([facility])    [address] [place] [address]   [phone]            Oct 13, 2017  9:30 AM CDT   Level 2 with BAY 4 INFUSION   [facility])    [address] [place] [id number] [phone]            Nov 07, 2017  9:20 AM CST   Return Visit with Mario Davenport MD   [facility] ([facility])    [address] [facility] [id number] [phone]            Nov 10, 2017  9:30 AM CST   Level 2 with BAY 8 INFUSION   [facility])    [address] [place] [address] [phone]            Mar 12, 2018  2:10 PM CDT   Return Visit with Emeterio Loza MD   [facility])    [address] [place] [address] [phone]              Who to contact     If you have questions or need follow up information about today's clinic visit or your schedule please contact [facility] directly at [phone].  Normal or non-critical lab and imaging results will be communicated to you by MyChart, letter or phone within 4 business days after the clinic has received the results. If you do not hear from us within 7 days, please contact the clinic through MyChart or phone. If you have a critical or abnormal lab result, we will  notify you by phone as soon as possible.  Submit refill requests through [employer] or call your pharmacy and they will forward the refill request to us. Please allow 3 business days for your refill to be completed.          Additional Information About Your Visit        [employer] Information     [employer] gives you secure access to your electronic health record. If you see a primary care provider, you can also send messages to your care team and make appointments. If you have questions, please call your primary care clinic.  If you do not have a primary care provider, please call [phone] and they will assist you.        Care EveryWhere ID     This is your Care EveryWhere ID. This could be used by other organizations to access your [facility] medical records  [id number]        Your Vitals Were     Pulse Temperature Pulse Oximetry BMI (Body Mass Index)          65 98  F (36.7  C) (Oral) 97% 27.99 kg/m2         Blood Pressure from Last 3 Encounters:   09/16/17 128/69   09/15/17 146/70   09/11/17 138/67    Weight from Last 3 Encounters:   09/16/17 158 lb (71.7 kg)   09/15/17 158 lb 11.2 oz (72 kg)   09/11/17 158 lb 6.4 oz (71.8 kg)              We Performed the Following     OFFICE/OUTPT VISIT,EST,LEVL I          Today's Medication Changes          These changes are accurate as of: 9/16/17 10:08 AM.  If you have any questions, ask your nurse or doctor.               These medicines have changed or have updated prescriptions.        Dose/Directions    metoprolol 25 MG 24 hr tablet   Commonly known as:  TOPROL-XL   This may have changed:  how much to take   Used for:  Chronic atrial fibrillation (H)        Dose:  25 mg   Take 1 tablet (25 mg) by mouth every morning   Quantity:  90 tablet   Refills:  3                Primary Care Provider Office Phone # Fax #    Tre Lockett [phone]       [address]  [facility] [address]        Equal Access to Services     [facility]: Paul schuster  el Goyal, wareidda luqadaha, qaybta kaalmada ana, zahraa chazin hayaan kimberlynicolas damienarnulfo ladejuancapri brody. So [facility] [phone].    ATENCIÓN: Si ashlila jaskaran, tiene a merchant disposición servicios gratuitos de asistencia lingüística. Alin al [phone].    We comply with applicable federal civil rights laws and [place] laws. We do not discriminate on the basis of race, color, national origin, age, disability sex, sexual orientation or gender identity.            Thank you!     Thank you for choosing [facility]  for your care. Our goal is always to provide you with excellent care. Hearing back from our patients is one way we can continue to improve our services. Please take a few minutes to complete the written survey that you may receive in the mail after your visit with us. Thank you!             Your Updated Medication List - Protect others around you: Learn how to safely use, store and throw away your medicines at www.disposemymeds.org.          This list is accurate as of: 9/16/17 10:08 AM.  Always use your most recent med list.                   Brand Name Dispense Instructions for use Diagnosis    apixaban ANTICOAGULANT 2.5 MG tablet    ELIQUIS    60 tablet    Take 1 tablet (2.5 mg) by mouth 2 times daily    H/O atrial flutter       azaTHIOprine 50 MG tablet    IMURAN    90 tablet    Take 1 tablet (50 mg) by mouth daily    Rheumatoid arthritis involving multiple sites with positive rheumatoid factor (H), High risk medication use       bismuth subsalicylate 262 MG Tabs     80 tablet    Take 1 tablet by mouth every 4 hours as needed    Acute infectious diarrhea, Enteritis due to Norovirus       Blood Pressure Monitor Kit     1 kit    1 kit daily as needed    CHF (congestive heart failure) (H)       calcium-vitamin D 600-400 MG-UNIT per tablet    CALTRATE     Take 1 tablet by mouth 2 times daily        cholestyramine 4 G Packet    QUESTRAN    60 each    Take 1 packet (4 g) by mouth 2 times  daily (with meals)    Acute diarrhea       ciprofloxacin 250 MG tablet    CIPRO    90 tablet    Take 1 tablet (250 mg) by mouth daily    Chronic UTI       Cranberry 180 MG Caps      Take 1 capsule by mouth daily Unsure of strength        fish oil-omega-3 fatty acids 1000 MG capsule      Take 2 g by mouth daily. 2 capsules daily        FLAGYL PO      Take 500 mg by mouth 2 times daily    Acute infectious diarrhea       folic acid 1 MG tablet    FOLVITE    100 tablet    Take 1 tablet (1 mg) by mouth daily    Oral aphthae       GENTEAL MILD OP      Apply  to eye daily.        IBANdronate 150 MG tablet    BONIVA    1 tablet    Take 1 tablet (150 mg) by mouth every 30 days Take 60 minutes before am meal with 8 oz. water. Remain upright for 30 minutes.    Osteopenia       levothyroxine 75 MCG tablet    SYNTHROID/LEVOTHROID    90 tablet    TAKE ONE TABLET BY MOUTH EVERY DAY    Hypothyroidism, unspecified type       LOSARTAN POTASSIUM PO      Take 25 mg by mouth daily (with dinner) Hold if SBP < 110.        * LUCENTIS IO      1 injection every 4 Weeks        * ranibizumab 0.3 MG/0.05ML Soln    LUCENTIS     0.3 mg by Intravitreal route        metoprolol 25 MG 24 hr tablet    TOPROL-XL    90 tablet    Take 1 tablet (25 mg) by mouth every morning    Chronic atrial fibrillation (H)       nitroGLYcerin 0.4 MG sublingual tablet    NITROSTAT    25 tablet    Place 1 tablet (0.4 mg) under the tongue every 5 minutes as needed for chest pain    Chest pain       * order for DME     1 Box    Equipment being ordered: Dispense face mask. Mrs. Spicer is immunosuppressed due to rheumatoid arthritis.    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 Use with Albuterol.    Hypoxia       order for DME     1 each    Equipment being ordered: Dispense baffle, for use with nebulizer.    Pneumonia of left upper lobe due to Mycoplasma pneumoniae        PRESERVISION AREDS PO      Take 2 tablets by mouth daily        propranolol 40 MG tablet    INDERAL    60 tablet    Take 1 tablet (40 mg) by mouth 2 times daily as needed    HANS (generalized anxiety disorder)       ranitidine 150 MG tablet    ZANTAC    60 tablet    Take 1 tablet (150 mg) by mouth 2 times daily    Gastroesophageal reflux disease without esophagitis       REFRESH P.M. Oint      Apply  to eye. Daily at bedtime        saccharomyces boulardii 250 MG capsule    FLORASTOR     Take 250 mg by mouth        senna-docusate 8.6-50 MG per tablet    SENOKOT-S;PERICOLACE    120 tablet    Take 2 tablets by mouth At Bedtime Hold if diarrhea occurs.    Constipation, chronic       SPIRONOLACTONE PO      Take 25 mg by mouth every morning Hold if SBP is less than 120.    Congestive heart failure with preserved LV function, NYHA class 3 (H)       triamcinolone 0.1 % cream    KENALOG    453.6 g    Apply sparingly to affected area on face three times daily.    Facial lesion       ZYRTEC ALLERGY 10 MG tablet   Generic drug:  cetirizine      Take 10 mg by mouth At Bedtime        * Notice:  This list has 4 medication(s) that are the same as other medications prescribed for you. Read the directions carefully, and ask your doctor or other care provider to review them with you.

## 2017-09-18 ENCOUNTER — CARE COORDINATION (OUTPATIENT)
Dept: CARE COORDINATION | Facility: CLINIC | Age: 82
End: 2017-09-18

## 2017-09-18 NOTE — PROGRESS NOTES
"Clinic Care Coordination Contact  Care Team Conversations    Patient had a fall 1 week ago and was seen at [facility].  Patient states since the fall she has had chest muscle pain located underneath her left breast that is worsened with talking and coughing.  \"When I fell it was like a jerking motion and I pulled something.\"  Patient states last night she began coughing and notes her shortness of breath \"Is slightly worse\" than her baseline dyspnea.  Patient checked her vitals this morning and temperature was 97, oxygen saturation was 96%.  Patient checked her blood pressure and pulse and did not have the readings but stated they were \"normal.\"  Patient states when she was at [facility] they did an MRI, X-rays \"checked my leads in my heart and my pacemaker and everything was fine.\"  RN CC reviewed when to seek emergency care for new/worsening chest pain, change in character, if unable to speak in full sentences due to shortness of breath.     Patient inquires if Dr. Lockett would see her today to evaluate her new cough, worsening chest muscle pain and slightly worsening shortness of breath.  Please advise.    Melissa Behl BSN, RN, N  [facility] Care Coordinator  [phone]  [email or web address]       "

## 2017-09-18 NOTE — PROGRESS NOTES
Clinic Care Coordination Contact  Care Team Conversations    RN CC spoke with triage RN with Dr. Lockett and states Dr. Lockett is advising ER due to patient's increase in shortness of breath.  RN CC spoke with patient and advised her to go to the ED per Dr. Lockett's recommendations and patient was agreeable to plan.    RN CC will follow up with patient tomorrow.    Melissa Behl BSN, RN, PHN  [facility] Care Coordinator  [phone]  [email or web address]

## 2017-09-19 NOTE — PROGRESS NOTES
"Clinic Care Coordination Contact  Care Team Conversations    RN CC spoke with patient who states she is doing better since her ED visit yesterday.  Patient states she was checked over pneumonia \"and anything for my heart\" and was told that her pain and shortness of breath was likely coming from bruising of ribs and muscles from her fall last week.  Patient will continue her albuterol nebulizer per ED instructions.    RN CC will follow up with patient in 1 week.    Melissa Behl BSN, RN, N  [facility] Care Coordinator  [phone]  [email or web address]       "

## 2017-09-25 ENCOUNTER — TELEPHONE (OUTPATIENT)
Dept: CARDIOLOGY | Facility: CLINIC | Age: 82
End: 2017-09-25

## 2017-09-25 ENCOUNTER — CARE COORDINATION (OUTPATIENT)
Dept: CARE COORDINATION | Facility: CLINIC | Age: 82
End: 2017-09-25

## 2017-09-25 NOTE — TELEPHONE ENCOUNTER
Called and spoke to patient. She states that on Saturday her SBP was 88 and she held the spironolactone for 2 days. She increased her fluid and salt intake. Her SBP did go up to 140. She feels much better today. She is wondering if she should cut her spironolactone to 12.5 mg. She already holds it for SBP<120.  Takes losartan 25 mg in the evening. Takes toprol 12.5 mg twice a day. Takes the spironolactone in the morning.   She also wants Dr Loza to be aware that after she saw him two weeks ago she tripped and fell while walking. She tripped on uneven ground.  Will route to Dr Loza regarding medication question.     Date: 9/25/2017    Time of Call: 11:41 AM       [  ] Ordering provider: Dr Emeterio Loza    Order: Her request is quite reasonable.   Drop spironolactone back to 12.5 mg qd.      Order received by: BRANDI Berger     Follow-up/additional notes: Informed patient. She had no questions. She will keep us updated regarding her blood pressures.

## 2017-09-25 NOTE — PROGRESS NOTES
"Clinic Care Coordination Contact  OUTREACH    Referral Information:  Referral Source: Self-patient/Caregiver  Reason for Contact: RN CC call to patient.  Care Conference: No     Universal Utilization:   ED Visits in last year: 9  Hospital visits in last year: 4  Last PCP appointment: 06/15/17  Missed Appointments: 1  Concerns: no  Multiple Providers or Specialists: yes    Clinical Concerns:  Current Medical Concerns: Patient left a voicemail for patient on 9/23/17 at 9:28 am stating she was not feeling well.  RN CC called patient today who states she is feeling much better.  Patient states on Saturday morning her systolic blood pressure was 88 and her heart rate was \"high\" and she did not feel well.  Patient states she did not want to go to the ED \"and just sit around all day,\" so she eat salt and drank water and by noon her systolic blood pressure was 118 and her heart rate was \"normal.\"  Patient did not recall diastolic readings or exact heart rate readings.  Patient informed writer that she developed a cold/cough and had been feeling ill on Saturday.  Patient denies experiences worsening shortness of breath, chest pain or fever.  Patient reports her cold/cough is improving and she remains afebrile.  Patient states she also has a message into her cardiology nurse.  RN CC advised patient to call writer/clinic for new, worsening symptoms or if symptoms do not continue to improve.  Patient verbalized understanding.    Current Behavioral Concerns: n/a    Education Provided to patient: RN CC reviewed 24 hour nurse line phone number with patient and advised when to contact clinic.     Clinical Pathway Name: None    Medication Management:  Patient sets up her medications weekly in a pill box and her daughter has been calling her in the evenings to remind her to take her evening medications.  Patient reports compliance and understanding of medication regimen.     Functional Status:  Mobility Status: Independent  Equipment " Currently Used at Home: raised toilet, shower chair  Transportation: Patient's family provides or she utilizes [employer] mobility.           Psychosocial:  Current living arrangement:: I live in a private home with spouse (Live in a Senior Apartment)  Financial/Insurance: No concerns.       Resources and Interventions:  Current Resources:  ; Other (see comment) (Heart Failure Action Plan)        Advanced Care Plans/Directives on file:: In process        Goals:   Goal 1 Statement: I will complete my health care directive.  Goal 1 Progression Percent: 50%  Goal 1 Progression Date: 09/25/17              Barriers: Patient has multiple health issues and she is dependent on family to complete her health care directive.  Strengths: Patient is engaged with care coordination.  Patient/Caregiver understanding: Patient wrote down phone number of 24 hour nurse line and verbalized understanding of when to contact writer/clinic.  Patient will await call back from cardiology nurse regarding her symptoms this weekend.  Frequency of Care Coordination: as needed  Upcoming appointment: 10/04/17 (cardiology)     Plan:   Patient will continue to work on finishing her health care directive.  Patient will call writer or clinic for new, worsening or no improvement in symptoms of cough.  Patient will utilize 24 hour nurse line if clinic is closed or if she is unable to get a hold of care coordination.  RN CC will follow up with patient in 1-2 weeks.    Melissa Behl BSN, RN, N  [facility] Care Coordinator  [phone]  [email or web address]

## 2017-09-25 NOTE — TELEPHONE ENCOUNTER
"Pt called and left voicemail on clinic phone stating, \"I am having blood pressure problems\". Pt is requesting a callback at [phone].  Jaz Dumas CMA      "

## 2017-10-04 ENCOUNTER — OFFICE VISIT (OUTPATIENT)
Dept: CARDIOLOGY | Facility: CLINIC | Age: 82
End: 2017-10-04
Payer: MEDICARE

## 2017-10-04 DIAGNOSIS — I49.5 SICK SINUS SYNDROME (H): Primary | ICD-10-CM

## 2017-10-04 PROCEDURE — 93288 INTERROG EVL PM/LDLS PM IP: CPT | Mod: 26 | Performed by: INTERNAL MEDICINE

## 2017-10-04 NOTE — PROGRESS NOTES
Pt seen in clinic for evaluation and iterative programming of her Medtronic dual chamber pacemaker per MD order.  She looks great and she states that she feels really good.  Her pacemaker check shows AF burden 26.9% of the time, no ventricular arrhythmias have been recorded.  She is RV pacing 17.2% of the time, her heart rate histograms show fair heart variation, and her pacemaker battery estimates 8 years left.  We will plan for her to send a remote transmission on 1/8/18 and RTC 4/4/18.  Normal pacemaker function.    Dual pacemaker

## 2017-10-04 NOTE — MR AVS SNAPSHOT
After Visit Summary   10/4/2017    Linda Spicer    MRN: [id number]           Patient Information     Date Of Birth          6/13/1931        Visit Information        Provider Department      10/4/2017 4:30 PM DEVICE CHECK RN CARD [facility]        Today's Diagnoses     Sick sinus syndrome (H)    -  1       Follow-ups after your visit        Follow-up notes from your care team     Discussed this visit Return in about 6 months (around 4/4/2018) for Pacemaker check.      Your next 10 appointments already scheduled     Oct 13, 2017  9:30 AM CDT   Level 2 with [place] 4 INFUSION   [facility] ([facility])    [id number] [place] [id number]   [phone]            Nov 07, 2017  9:20 AM CST   Return Visit with Mario Davenport MD   [facility] ([facility])    [address] [place] [id number]   [phone]            Nov 10, 2017  9:30 AM CST   Level 2 with [place] 8 INFUSION   [facility] ([facility])    [id number] [place] [id number]   [phone]            Mar 12, 2018  2:10 PM CDT   Return Visit with Emeterio Loza MD   [facility] ([facility])    [address] [place] [id number]   [phone]            Apr 04, 2018  4:00 PM CDT   Return Visit with DEVICE CHECK RN CARD   [facility])    [address] [place] [id number]   [phone]              Who to contact     If you have questions or need follow up information about today's clinic visit or your schedule please contact [facility] directly at [phone].  Normal or non-critical lab and imaging results will be communicated to you by MyChart, letter or phone within 4 business days after the clinic has received the results. If you do not hear from  us within 7 days, please contact the clinic through [employer] or phone. If you have a critical or abnormal lab result, we will notify you by phone as soon as possible.  Submit refill requests through [employer] or call your pharmacy and they will forward the refill request to us. Please allow 3 business days for your refill to be completed.          Additional Information About Your Visit        [employer] Information     [employer] gives you secure access to your electronic health record. If you see a primary care provider, you can also send messages to your care team and make appointments. If you have questions, please call your primary care clinic.  If you do not have a primary care provider, please call [phone] and they will assist you.      [employer] is an electronic gateway that provides easy, online access to your medical records. With [employer], you can request a clinic appointment, read your test results, renew a prescription or communicate with your care team.     To access your existing account, please contact your [facility] or call [phone] for assistance.        Care EveryWhere ID     This is your Care EveryWhere ID. This could be used by other organizations to access your [place] medical records  [id number]         Blood Pressure from Last 3 Encounters:   09/16/17 128/69   09/15/17 146/70   09/11/17 138/67    Weight from Last 3 Encounters:   09/16/17 71.7 kg (158 lb)   09/15/17 72 kg (158 lb 11.2 oz)   09/11/17 71.8 kg (158 lb 6.4 oz)              We Performed the Following     PM DEVICE PROGRAMMING EVAL, DUAL LEAD PACER          Today's Medication Changes          These changes are accurate as of: 10/4/17  4:52 PM.  If you have any questions, ask your nurse or doctor.               These medicines have changed or have updated prescriptions.        Dose/Directions    metoprolol 25 MG 24 hr tablet   Commonly known as:  TOPROL-XL   This may have changed:    - how much to  take  - when to take this   Used for:  Chronic atrial fibrillation (H)        Dose:  25 mg   Take 1 tablet (25 mg) by mouth every morning   Quantity:  90 tablet   Refills:  3                Primary Care Provider Office Phone # Fax #    Tre Lockett [phone] [address]  [place] [facility] [address]        Equal Access to Services     [facility] AH: Hadii aad ku hadasho Soomaali, waaxda luqadaha, qaybta kaalmada adeegyada, waxay idiin hayaan adeeg kharash la'aan ah. So [facility] [phone].    ATENCIÓN: Si habla español, tiene a merchant disposición servicios gratuitos de asistencia lingüística. Arsename al [phone].    We comply with applicable federal civil rights laws and [place] laws. We do not discriminate on the basis of race, color, national origin, age, disability, sex, sexual orientation, or gender identity.            Thank you!     Thank you for choosing [facility]  for your care. Our goal is always to provide you with excellent care. Hearing back from our patients is one way we can continue to improve our services. Please take a few minutes to complete the written survey that you may receive in the mail after your visit with us. Thank you!             Your Updated Medication List - Protect others around you: Learn how to safely use, store and throw away your medicines at www.disposemymeds.org.          This list is accurate as of: 10/4/17  4:52 PM.  Always use your most recent med list.                   Brand Name Dispense Instructions for use Diagnosis    apixaban ANTICOAGULANT 2.5 MG tablet    ELIQUIS    60 tablet    Take 1 tablet (2.5 mg) by mouth 2 times daily    H/O atrial flutter       azaTHIOprine 50 MG tablet    IMURAN    90 tablet    Take 1 tablet (50 mg) by mouth daily    Rheumatoid arthritis involving multiple sites with positive rheumatoid factor (H), High risk medication use       bismuth subsalicylate 262 MG Tabs     80 tablet    Take 1 tablet by  mouth every 4 hours as needed    Acute infectious diarrhea, Enteritis due to Norovirus       Blood Pressure Monitor Kit     1 kit    1 kit daily as needed    CHF (congestive heart failure) (H)       calcium-vitamin D 600-400 MG-UNIT per tablet    CALTRATE     Take 1 tablet by mouth 2 times daily        cholestyramine 4 G Packet    QUESTRAN    60 each    Take 1 packet (4 g) by mouth 2 times daily (with meals)    Acute diarrhea       ciprofloxacin 250 MG tablet    CIPRO    90 tablet    Take 1 tablet (250 mg) by mouth daily    Chronic UTI       Cranberry 180 MG Caps      Take 1 capsule by mouth daily Unsure of strength        fish oil-omega-3 fatty acids 1000 MG capsule      Take 2 g by mouth daily. 2 capsules daily        FLAGYL PO      Take 500 mg by mouth 2 times daily    Acute infectious diarrhea       folic acid 1 MG tablet    FOLVITE    100 tablet    Take 1 tablet (1 mg) by mouth daily    Oral aphthae       GENTEAL MILD OP      Apply  to eye daily.        IBANdronate 150 MG tablet    BONIVA    1 tablet    Take 1 tablet (150 mg) by mouth every 30 days Take 60 minutes before am meal with 8 oz. water. Remain upright for 30 minutes.    Osteopenia       levothyroxine 75 MCG tablet    SYNTHROID/LEVOTHROID    90 tablet    TAKE ONE TABLET BY MOUTH EVERY DAY    Hypothyroidism, unspecified type       LOSARTAN POTASSIUM PO      Take 25 mg by mouth daily (with dinner) Hold if SBP < 110.        * LUCENTIS IO      1 injection every 4 Weeks        * ranibizumab 0.3 MG/0.05ML Soln    LUCENTIS     0.3 mg by Intravitreal route        metoprolol 25 MG 24 hr tablet    TOPROL-XL    90 tablet    Take 1 tablet (25 mg) by mouth every morning    Chronic atrial fibrillation (H)       nitroGLYcerin 0.4 MG sublingual tablet    NITROSTAT    25 tablet    Place 1 tablet (0.4 mg) under the tongue every 5 minutes as needed for chest pain    Chest pain       * order for DME     1 Box    Equipment being ordered: Dispense face mask. Mrs. Spicer is  immunosuppressed due to rheumatoid arthritis.    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 Use with Albuterol.    Hypoxia       order for DME     1 each    Equipment being ordered: Dispense baffle, for use with nebulizer.    Pneumonia of left upper lobe due to Mycoplasma pneumoniae       PRESERVISION AREDS PO      Take 2 tablets by mouth daily        ranitidine 150 MG tablet    ZANTAC    60 tablet    Take 1 tablet (150 mg) by mouth 2 times daily    Gastroesophageal reflux disease without esophagitis       REFRESH P.M. Oint      Apply  to eye. Daily at bedtime        saccharomyces boulardii 250 MG capsule    FLORASTOR     Take 250 mg by mouth        senna-docusate 8.6-50 MG per tablet    SENOKOT-S;PERICOLACE    120 tablet    Take 2 tablets by mouth At Bedtime Hold if diarrhea occurs.    Constipation, chronic       SPIRONOLACTONE PO      Take 12.5 mg by mouth every morning Hold if SBP is less than 120.    Congestive heart failure with preserved LV function, NYHA class 3 (H)       triamcinolone 0.1 % cream    KENALOG    453.6 g    Apply sparingly to affected area on face three times daily.    Facial lesion       ZYRTEC ALLERGY 10 MG tablet   Generic drug:  cetirizine      Take 10 mg by mouth At Bedtime        * Notice:  This list has 4 medication(s) that are the same as other medications prescribed for you. Read the directions carefully, and ask your doctor or other care provider to review them with you.

## 2017-10-12 ENCOUNTER — CARE COORDINATION (OUTPATIENT)
Dept: CARE COORDINATION | Facility: CLINIC | Age: 82
End: 2017-10-12

## 2017-10-12 NOTE — PROGRESS NOTES
Clinic Care Coordination Contact  OUTREACH    Referral Information:  Referral Source: Self-patient/Caregiver  Reason for Contact: RN CC call to patient for follow up.  Care Conference: No     Universal Utilization:   ED Visits in last year: 9  Hospital visits in last year: 4  Last PCP appointment: 06/15/17  Missed Appointments: 1  Concerns: no  Multiple Providers or Specialists: yes    Clinical Concerns:  Current Medical Concerns: Patient reports her blood pressure and symptoms of dizziness have greatly improved since decreasing her spironolactone back to 12.5mg daily.  Patient holds spironolactone if SBP is <120.  Patient states this occurs 1-2 times/month, last occurrence was this morning.  Patient reports his SBP of 114, Hr 72, does not recall diastolic.  Patient states she was a little lightheaded, so she is drinking water and eating salt and it is improving.  Patient has the phone number to the cardiology nurse and will call if symptoms persist, worsen or if new symptoms develop.    Current Behavioral Concerns: n/a    Education Provided to patient: RN PENNY reviewed need for health care directive.   Clinical Pathway Name: None    Medication Management:  Patient sets up her medications weekly in a pill box and her daughter has been calling her in the evenings to remind her to take her evening medications.  Patient reports compliance and understanding of medication regimen.        Functional Status:  Mobility Status: Independent  Equipment Currently Used at Home: raised toilet, shower chair  Transportation: Patient uses [employer] or family provides.           Psychosocial:  Current living arrangement:: I live in a private home with spouse (Live in a Senior Apartment)  Financial/Insurance: No concerns at this time.       Resources and Interventions:  Current Resources:  ; Other (see comment) (Heart Failure Action Plan)        Advanced Care Plans/Directives on file:: In process        Goals:   Goal 1 Statement: I  will complete my health care directive.  Goal 1 Progression Percent: 50%  Goal 1 Progression Date: 10/12/17              Barriers: multiple complex health issues  Strengths: has family support  Patient/Caregiver understanding: Patient verbalizes understanding of when to call cardiology and seek emergency care for her blood pressure issues.  Patient continues to try and complete health care directive.  Frequency of Care Coordination: as needed  Upcoming appointment: 10/04/17 (cardiology)     Plan:   Patient will call cardiology office for new, worsening or persistent blood pressure concerns.  Patient will complete her health care directive.  RN CC will follow up with patient in 1 month.    Melissa Behl BSN, RN, PHN  [facility] Care Coordinator  [phone]  [email or web address]

## 2017-10-13 ENCOUNTER — INFUSION THERAPY VISIT (OUTPATIENT)
Dept: INFUSION THERAPY | Facility: CLINIC | Age: 82
End: 2017-10-13
Payer: MEDICARE

## 2017-10-13 VITALS
SYSTOLIC BLOOD PRESSURE: 131 MMHG | HEART RATE: 60 BPM | RESPIRATION RATE: 20 BRPM | BODY MASS INDEX: 28.33 KG/M2 | WEIGHT: 159.9 LBS | OXYGEN SATURATION: 98 % | TEMPERATURE: 96.9 F | DIASTOLIC BLOOD PRESSURE: 64 MMHG

## 2017-10-13 DIAGNOSIS — Z23 NEED FOR PROPHYLACTIC VACCINATION AND INOCULATION AGAINST INFLUENZA: Primary | ICD-10-CM

## 2017-10-13 DIAGNOSIS — M05.79 RHEUMATOID ARTHRITIS INVOLVING MULTIPLE SITES WITH POSITIVE RHEUMATOID FACTOR (H): ICD-10-CM

## 2017-10-13 PROCEDURE — G0008 ADMIN INFLUENZA VIRUS VAC: HCPCS | Performed by: NURSE PRACTITIONER

## 2017-10-13 PROCEDURE — 96365 THER/PROPH/DIAG IV INF INIT: CPT | Performed by: NURSE PRACTITIONER

## 2017-10-13 PROCEDURE — 99207 ZZC NO CHARGE LOS: CPT

## 2017-10-13 PROCEDURE — 90662 IIV NO PRSV INCREASED AG IM: CPT | Performed by: NURSE PRACTITIONER

## 2017-10-13 RX ADMIN — Medication 250 ML: at 09:57

## 2017-10-13 ASSESSMENT — PAIN SCALES - GENERAL: PAINLEVEL: NO PAIN (0)

## 2017-10-13 NOTE — PROGRESS NOTES
"Infusion Nursing Note:  Linda Spicer presents today for Orencia/Influenza vaccine.    Patient seen by provider today: No   present during visit today: Not Applicable.    Note: Injectable Influenza Immunization Documentation  1.  Has the patient received the information for the injectable influenza vaccine? YES     2. Is the patient 6 months of age or older? YES     3. Does the patient have any of the following contraindications?         Severe allergy to eggs? No     Severe allergic reaction to previous influenza vaccines? No   Severe allergy to latex? No       History of Guillain-[place] syndrome? No     Currently have a temperature greater than 100.4F? No  4.  Severely egg allergic patients should have flu vaccine eligibility assessed by an MD, RN, or pharmacist, and those who received flu vaccine should be observed for 15 min by an MD, RN, Pharmacist, Medical Technician, or member of clinic staff.\": YES    5. Latex-allergic patients should be given latex-free influenza vaccine Yes. Please reference the Vaccine latex table to determine if your clinic s product is latex-containing.    Vaccination given by Trinity Villalpando RN    Intravenous Access:  Peripheral IV placed.    Treatment Conditions:  Rheumatology Infusion Checklist: PRIOR TO INFUSION OF BIOLOGICAL MEDICATIONS OR ANY OF THESE AS LISTED: Orencia (abatacept) \".rheumbiologicalchecklist\"    Prior to Infusion of biological medications or any of these as listed:    1. Elevated temperature, fever, chills, productive cough or abnormal vital signs, night sweats, coughing up blood or sputum, no appetite or abnormal vital signs : NO    2. Open wounds or new incisions: NO    3. Recent hospitalization: NO    4.  Recent surgeries:  NO    5. Any upcoming surgeries or dental procedures?:NO    6. Any current or recent bouts of illness or infection? On any antibiotics? : NO    7. Any new, sudden or worsening abdominal pain :NO    8. Vaccination within 4 weeks? " Patient or someone in the household is scheduled to receive vaccination? No live virus vaccines prior to or during treatment :NO    9. Any nervous system diseases [i.e. multiple sclerosis, Guillain-[place], seizures, neurological  changes]: NO    10. Pregnant or breast feeding; or plans on pregnancy in the future: NO    11. Signs of worsening depression or suicidal ideations while taking benlysta:NO    12. New-onset medical symptoms: NO    13.  New cancer diagnosis or on chemotherapy or radiation NO    14.  Evaluate for any sign of active TB [Unexplained weight loss, Loss of appetite, Night sweats, Fever, Fatigue, Chills, Coughing for 3 weeks or longer, Hemoptysis (coughing up blood), Chest pain]: NO    **Note: If answered yes to any of the above, hold the infusion and contact ordering rheumatologist or on-call rheumatologist.     Post Infusion Assessment:  Patient tolerated infusion without incident.  Blood return noted pre and post infusion.  Site patent and intact, free from redness, edema or discomfort.  Access discontinued per protocol.  Rheumatology Post Infusion: POST-INFUSION OF BIOLOGICAL MEDICATION:    Reviewed with patient.  Given biologic medication or medication hand-out. Inform patient if any fever, chills or signs of infection, new symptoms, abdominal pain, heart palpitations, shortness of breath, reaction, weakness, neurological changes, seek medical attention immediately and should not receive infusions. No live virus vaccines prior to or during treatment or up to 6 months post infusion. If the patient has an upcoming procedure or surgery, this should be discussed with the rheumatologist and surgeon or provider..    Discharge Plan:   Patient will return 11/10/17 for next appointment.   Patient discharged in stable condition accompanied by: daughter.  Departure Mode: Ambulatory.    Trinity Villalpando RN

## 2017-10-13 NOTE — MR AVS SNAPSHOT
After Visit Summary   10/13/2017    Linda Spicer    MRN: [id number]           Patient Information     Date Of Birth          6/13/1931        Visit Information        Provider Department      10/13/2017 9:30 AM [place] 4 [facility]        Today's Diagnoses     Need for prophylactic vaccination and inoculation against influenza    -  1    Rheumatoid arthritis involving multiple sites with positive rheumatoid factor (H)           Follow-ups after your visit        Your next 10 appointments already scheduled     Nov 07, 2017  9:20 AM CST   [facility]    [address] [place] [id number]   [phone]            Nov 10, 2017  9:30 AM CST   Level 2 with [place] 8 [facility] ([facility])    [address] [place] [id number] [phone]            Mar 12, 2018  2:10 PM CDT   Return Visit with Emeterio Loza MD   [facility])    [address] [place] [id number] [phone]            Apr 04, 2018  4:00 PM CDT   Return Visit with DEVICE CHECK RN CARD   [facility])    [address] [place] [id number] [phone]              Who to contact     If you have questions or need follow up information about today's clinic visit or your schedule please contact [facility] directly at [phone].  Normal or non-critical lab and imaging results will be communicated to you by MyChart, letter or phone within 4 business days after the clinic has received the results. If you do not hear from us within 7 days, please contact the clinic through MyChart or phone. If you have a critical or abnormal lab result, we will notify you by phone as soon as possible.  Submit refill requests  through [employer] or call your pharmacy and they will forward the refill request to us. Please allow 3 business days for your refill to be completed.          Additional Information About Your Visit        [employer] Information     [employer] gives you secure access to your electronic health record. If you see a primary care provider, you can also send messages to your care team and make appointments. If you have questions, please call your primary care clinic.  If you do not have a primary care provider, please call [phone] and they will assist you.      [employer] is an electronic gateway that provides easy, online access to your medical records. With [employer], you can request a clinic appointment, read your test results, renew a prescription or communicate with your care team.     To access your existing account, please contact your [facility] or call [phone] for assistance.        Care EveryWhere ID     This is your Care EveryWhere ID. This could be used by other organizations to access your [place] medical records  [id number]        Your Vitals Were     Pulse Temperature Respirations Pulse Oximetry BMI (Body Mass Index)       60 96.9  F (36.1  C) (Oral) 20 98% 28.33 kg/m2        Blood Pressure from Last 3 Encounters:   10/13/17 131/64   09/16/17 128/69   09/15/17 146/70    Weight from Last 3 Encounters:   10/13/17 72.5 kg (159 lb 14.4 oz)   09/16/17 71.7 kg (158 lb)   09/15/17 72 kg (158 lb 11.2 oz)              Today, you had the following     No orders found for display         Today's Medication Changes          These changes are accurate as of: 10/13/17  1:26 PM.  If you have any questions, ask your nurse or doctor.               These medicines have changed or have updated prescriptions.        Dose/Directions    metoprolol 25 MG 24 hr tablet   Commonly known as:  TOPROL-XL   This may have changed:    - how much to take  - when to take this   Used for:  Chronic atrial  fibrillation (H)        Dose:  25 mg   Take 1 tablet (25 mg) by mouth every morning   Quantity:  90 tablet   Refills:  3                Primary Care Provider Office Phone # Fax #    Tre Lockett [phone]       [address]  [facility] [address]        Equal Access to Services     [facility] Hospital[facility] : Hadii aad ku hadasho Soomaali, waaxda luqadaha, qaybta kaalmada adeegyada, waxay marisabel hayaan adenicolas khirmash ladejuann . So [facility] [phone].    ATENCIÓN: Si habla español, tiene a merchant disposición servicios gratuitos de asistencia lingüística. Arsename al [phone].    We comply with applicable federal civil rights laws and [place] laws. We do not discriminate on the basis of race, color, national origin, age, disability, sex, sexual orientation, or gender identity.            Thank you!     Thank you for choosing [facility]  for your care. Our goal is always to provide you with excellent care. Hearing back from our patients is one way we can continue to improve our services. Please take a few minutes to complete the written survey that you may receive in the mail after your visit with us. Thank you!             Your Updated Medication List - Protect others around you: Learn how to safely use, store and throw away your medicines at www.disposemymeds.org.          This list is accurate as of: 10/13/17  1:26 PM.  Always use your most recent med list.                   Brand Name Dispense Instructions for use Diagnosis    apixaban ANTICOAGULANT 2.5 MG tablet    ELIQUIS    60 tablet    Take 1 tablet (2.5 mg) by mouth 2 times daily    H/O atrial flutter       azaTHIOprine 50 MG tablet    IMURAN    90 tablet    Take 1 tablet (50 mg) by mouth daily    Rheumatoid arthritis involving multiple sites with positive rheumatoid factor (H), High risk medication use       bismuth subsalicylate 262 MG Tabs     80 tablet    Take 1 tablet by mouth every 4 hours as needed    Acute infectious  diarrhea, Enteritis due to Norovirus       Blood Pressure Monitor Kit     1 kit    1 kit daily as needed    CHF (congestive heart failure) (H)       calcium-vitamin D 600-400 MG-UNIT per tablet    CALTRATE     Take 1 tablet by mouth 2 times daily        cholestyramine 4 G Packet    QUESTRAN    60 each    Take 1 packet (4 g) by mouth 2 times daily (with meals)    Acute diarrhea       ciprofloxacin 250 MG tablet    CIPRO    90 tablet    Take 1 tablet (250 mg) by mouth daily    Chronic UTI       Cranberry 180 MG Caps      Take 1 capsule by mouth daily Unsure of strength        fish oil-omega-3 fatty acids 1000 MG capsule      Take 2 g by mouth daily. 2 capsules daily        FLAGYL PO      Take 500 mg by mouth 2 times daily    Acute infectious diarrhea       folic acid 1 MG tablet    FOLVITE    100 tablet    Take 1 tablet (1 mg) by mouth daily    Oral aphthae       GENTEAL MILD OP      Apply  to eye daily.        IBANdronate 150 MG tablet    BONIVA    1 tablet    Take 1 tablet (150 mg) by mouth every 30 days Take 60 minutes before am meal with 8 oz. water. Remain upright for 30 minutes.    Osteopenia       levothyroxine 75 MCG tablet    SYNTHROID/LEVOTHROID    90 tablet    TAKE ONE TABLET BY MOUTH EVERY DAY    Hypothyroidism, unspecified type       LOSARTAN POTASSIUM PO      Take 25 mg by mouth daily (with dinner) Hold if SBP < 110.        * LUCENTIS IO      1 injection every 4 Weeks        * ranibizumab 0.3 MG/0.05ML Soln    LUCENTIS     0.3 mg by Intravitreal route        metoprolol 25 MG 24 hr tablet    TOPROL-XL    90 tablet    Take 1 tablet (25 mg) by mouth every morning    Chronic atrial fibrillation (H)       nitroGLYcerin 0.4 MG sublingual tablet    NITROSTAT    25 tablet    Place 1 tablet (0.4 mg) under the tongue every 5 minutes as needed for chest pain    Chest pain       * order for DME     1 Box    Equipment being ordered: Dispense face mask. Mrs. Spicer is immunosuppressed due to rheumatoid arthritis.     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 Use with Albuterol.    Hypoxia       order for DME     1 each    Equipment being ordered: Dispense baffle, for use with nebulizer.    Pneumonia of left upper lobe due to Mycoplasma pneumoniae       PRESERVISION AREDS PO      Take 2 tablets by mouth daily        ranitidine 150 MG tablet    ZANTAC    60 tablet    Take 1 tablet (150 mg) by mouth 2 times daily    Gastroesophageal reflux disease without esophagitis       REFRESH P.M. Oint      Apply  to eye. Daily at bedtime        saccharomyces boulardii 250 MG capsule    FLORASTOR     Take 250 mg by mouth        senna-docusate 8.6-50 MG per tablet    SENOKOT-S;PERICOLACE    120 tablet    Take 2 tablets by mouth At Bedtime Hold if diarrhea occurs.    Constipation, chronic       SPIRONOLACTONE PO      Take 12.5 mg by mouth every morning Hold if SBP is less than 120.    Congestive heart failure with preserved LV function, NYHA class 3 (H)       triamcinolone 0.1 % cream    KENALOG    453.6 g    Apply sparingly to affected area on face three times daily.    Facial lesion       ZYRTEC ALLERGY 10 MG tablet   Generic drug:  cetirizine      Take 10 mg by mouth At Bedtime        * Notice:  This list has 4 medication(s) that are the same as other medications prescribed for you. Read the directions carefully, and ask your doctor or other care provider to review them with you.

## 2017-10-23 ENCOUNTER — CARE COORDINATION (OUTPATIENT)
Dept: CARE COORDINATION | Facility: CLINIC | Age: 82
End: 2017-10-23

## 2017-10-23 NOTE — PROGRESS NOTES
"Clinic Care Coordination Contact  Care Team Conversations    Patient called RN CC to inquire what she should do regarding her Imuran.  Patient states she took her last pill on Saturday (10/21/17) and then ran out.  Patient questions if she is to continue taking this.  RN CC reviewed last rheumatology office visit notes from 8/1/17:  1. Seropositive Nonerosive Rheumatoid Arthritis (RF 99, CCP 52): Previously treated with hydroxychloroquine 200 mg daily (stopped previously because of macular degeneration), methotrexate (leg cramps), Cimzia (stopped due to recurrent basal cell carcinoma and hx of heart failure). Has sulfa allergy. Leflunomide avoided because of ILD.  Currently on Orencia IV and azathioprine 50mg daily. Doing well today and therefore will maintain the current medication regimen.  TPMT 23.2 (normal 24-44) and considered \"intermediate activity\".  - Continue orencia 750mg IV, administered  at the [facility]  - Continue azathioprine 50mg daily  - Labs every 3 months to be done with her Orencia infusions (every third infusion): CBC, Creatinine, Hepatic Panel, ESR, CRP    Patient informed of refill sent on 8/1/17 and office visit notes stating to continue azathioprine.  Patient questions as to whether she should refill this, \"because I don't have any pain anymore, I don't know if I should keep taking it.\"  Patient requested for writer to send a message to Dr. Davenport to clarify whether she should continue taking Imuran.  Of note, patient has a future appointment 11/7/17 with Dr. Davenport.    Please advise per patient request.    Melissa Behl BSN, RN, N  [facility] Care Coordinator  [phone]  [email or web address]       "

## 2017-10-23 NOTE — PROGRESS NOTES
Melissa Behl,    Yes, Ms. Spicer should continue the azathioprine that is used for rheumatoid arthritis treatment.  I am glad that she is doing well.     Please notify patient.     Mario Davenport MD  10/23/2017 4:50 PM

## 2017-10-24 NOTE — PROGRESS NOTES
RN CC informed patient of pharmacy's response below.  Patient states she will discuss this further with Dr. Davenport at her upcoming appointment on 11/7/17.    Melissa Behl BSN, RN, N  [facility] Care Coordinator  [phone]  [email or web address]

## 2017-10-24 NOTE — PROGRESS NOTES
"Clinic Care Coordination Contact  Care Team Conversations    RN CC informed patient of Dr. Davenport's message below.  Patient states she needs a refill of azathioprine.  RN CC spoke with [facility] pharmacy who informed writer patient had filled a 90 day prescription on 8/21/17, so it is approximately 1 month too soon to fill.  RN CC informed patient of this information and she stated she would look into it.    RN CC inquired how her health care directive was coming along and patient informed writer she was hung up on how to word \"I don't want them pounding on my chest.\"  RN CC discussed CPR and intubation with patient.  Patient would like writer to send her more information via [employer].  RN CC will send a [employer] message as well as honoring choices education documents on Health Care Goals, Advance Care Directive Planning, CPR and Help with Breathing.    Melissa Behl BSN, RN, N  [facility] Care Coordinator  [phone]  [email or web address]       "

## 2017-10-24 NOTE — PROGRESS NOTES
Clinic Care Coordination Contact  [facility]/Voicemail    Referral Source: Self-patient/Caregiver  Clinical Data: Care Coordinator Outreach  Outreach attempted x 1.  Left message on voicemail with call back information and requested return call.  Plan: Care Coordinator mailed out care coordination introduction letter on 4/6/16. Care Coordinator will try to reach patient again in 1-2 business days.    Melissa Behl BSN, RN, PHN  [facility] Care Coordinator  [phone]  [email or web address]

## 2017-10-24 NOTE — PROGRESS NOTES
"Clinic Care Coordination Contact  Care Team Conversations    Patient called RN CC back to state she had been taking additional azathioprine in error.  Patient states \"for some time\" she was taking 2 tablets/day instead of 1 tablet/day as prescribed.  Patient states this would account for why she is running out of medication too soon.  Patient is uncertain of how long she had been taking 2 tablets/day or how long ago she stopped doing this and went back to 1 tablet/day as prescribed.  Patient then informed writer she was able to locate 3 week's worth of azathioprine and will run out on 11/14/17.  RN CC spoke with [facility] and was informed that patient is unable to  her next prescription until 11/18/17.  Patient will need to call the clinic near 11/14/17 and request #4 tablets azathioprine 50mg to be filled at cash, she can then pay $11.17 and then on 11/18/17 can  the remaining 86 tablets under insurance cost.    RN CC left a voicemail for patient to return writer's call with this information.    FYI to Dr. Davenport that patient informed writer today of azathioprine patient dosing error, patient taking 2 tablets/day for an unknown length of time and changed back to 1 tablet/day when she began feeling better.  Patient is unsure how long she has been taking 1 tablet/day.    Melissa Behl BSN, RN, N  [facility] Care Coordinator  [phone]  [email or web address]        "

## 2017-10-26 ENCOUNTER — CARE COORDINATION (OUTPATIENT)
Dept: CARE COORDINATION | Facility: CLINIC | Age: 82
End: 2017-10-26

## 2017-10-26 NOTE — PROGRESS NOTES
Clinic Care Coordination Contact  Care Team Conversations    Patient called RN CC to inquire who she saw for her foot problem last spring, as she has obtained the new orthotics and believes they have caused more problems than they have helped.  RN CC informed patient she saw Dr. Donaldson at [facility] 5/24/17 and provided her with the phone number.  Patient states she will call to schedule an appointment.    RN CC will follow up with patient as previously planned.    Melissa Behl BSN, RN, N  [facility] Care Coordinator  [phone]  [email or web address]

## 2017-10-30 ENCOUNTER — CARE COORDINATION (OUTPATIENT)
Dept: CARE COORDINATION | Facility: CLINIC | Age: 82
End: 2017-10-30

## 2017-10-30 NOTE — PROGRESS NOTES
Clinic Care Coordination Contact  [facility]/Voicemail    Referral Source: Self-patient/Caregiver    Clinical Data: Care Coordinator Outreach:  Pt left  for primary RN CC requesting return call secondary to her foot bothering her.  Pt noted that she an appointment for f/u on 11/8.  Per VM pt concerned that she is unable to be seen until 11/8 and wondered if she could be seen sooner.      Outreach attempted x 1.  Left message on voicemail with call back information and requested return call.    Plan: Will route to primary RN CC, Melissa Behl for further f/u.    Swathi Brito, RN BSN, PHN RN Care Coordinator  [facility]  [email or web address]  [phone]  10/30/2017 3:07 PM

## 2017-10-31 ENCOUNTER — CARE COORDINATION (OUTPATIENT)
Dept: CARE COORDINATION | Facility: CLINIC | Age: 82
End: 2017-10-31

## 2017-10-31 NOTE — PROGRESS NOTES
Clinic Care Coordination Contact  Care Team Conversations    Pt left a VM returning writers call.  Verbal handoff given to Melissa Behl, RN CC who agreed to f/u with patient.    Swathi Brito RN BSN, PHN RN Care Coordinator  [facility]  [email or web address]  [phone]  10/31/2017 9:41 AM

## 2017-10-31 NOTE — PROGRESS NOTES
Patient returned writer's call stating she does not think she should wait until 11/8/17 to see podiatry, as her right foot pain has worsened.  She reports pain each time she steps on her foot, but it is improved if she walks in house slippers instead of a hard soled shoe.  Patient reports wearing the orthotics and foot pads as last recommended by podiatry at 5/24/17 office visit.  Patient requesting an X-ray of her foot, as she is worried she has dislocated something by wearing the orthotics and foot pads.  RN CC offered to look for sooner openings with podiatry at other clinic sites, but pt declined requesting to see PCP.  RN CC scheduled patient for an appointment with PCP tonight at 6:40 pm.    RN CC will continue to follow patient monthly and remain available as needed.    Melissa Behl BSN, RN, N  [facility] Care Coordinator  [phone]  [email or web address]

## 2017-10-31 NOTE — PROGRESS NOTES
"Patient's daughter Xi called writer (consent to communicate on file) to request the appointment for tonight to be rescheduled for tomorrow.  RN CC informed patient's daughter Xi of same day appointments for tomorrow.  Xi states she will call tomorrow morning for a same day appointment with PCP for patient.  Xi informed writer that patient had been walking \"just fine\" today and verbalized concern over the sudden report from patient 2 weeks ago, which was the same day patient's son had foot surgery.      Melissa Behl BSN, RN, N  [facility] Care Coordinator  [phone]  [email or web address]    "

## 2017-10-31 NOTE — PROGRESS NOTES
Clinic Care Coordination Contact  [facility]/Voicemail    Referral Source: Self-patient/Caregiver  Clinical Data: Care Coordinator Outreach  Outreach attempted x 1.  Left message with spouse for patient to return writer's call, as patient had left a message per covering RN CC, Swathi Brito.  Plan: Care Coordinator mailed out care coordination introduction letter on 4/6/16. Care Coordinator will try to reach patient again in 3-5 business days.    Melissa Behl BSN, RN, PHN  [facility] Care Coordinator  [phone]  [email or web address]

## 2017-10-31 NOTE — PROGRESS NOTES
[facility] requesting a call back in 1 hour, as she was going to be stepping out again.  RN CC attempted to return patient's call when requested, however, patient was not home at this time.  RN CC left a message with spouse for patient to return writer's call.    Melissa Behl BSN, RN, N  [facility] Care Coordinator  [phone]  [email or web address]

## 2017-11-01 ENCOUNTER — RADIANT APPOINTMENT (OUTPATIENT)
Dept: GENERAL RADIOLOGY | Facility: CLINIC | Age: 82
End: 2017-11-01
Attending: INTERNAL MEDICINE
Payer: MEDICARE

## 2017-11-01 ENCOUNTER — OFFICE VISIT (OUTPATIENT)
Dept: FAMILY MEDICINE | Facility: CLINIC | Age: 82
End: 2017-11-01
Payer: MEDICARE

## 2017-11-01 VITALS
WEIGHT: 158 LBS | OXYGEN SATURATION: 98 % | HEART RATE: 66 BPM | SYSTOLIC BLOOD PRESSURE: 136 MMHG | HEIGHT: 63 IN | DIASTOLIC BLOOD PRESSURE: 59 MMHG | BODY MASS INDEX: 28 KG/M2 | TEMPERATURE: 98.1 F

## 2017-11-01 DIAGNOSIS — N18.30 CHRONIC KIDNEY DISEASE, STAGE 3 (MODERATE): ICD-10-CM

## 2017-11-01 DIAGNOSIS — M79.674 PAIN OF TOE OF RIGHT FOOT: Primary | ICD-10-CM

## 2017-11-01 DIAGNOSIS — Z98.890 STATUS POST BUNIONECTOMY: ICD-10-CM

## 2017-11-01 LAB
ALBUMIN SERPL-MCNC: 3.6 G/DL (ref 3.4–5)
ALP SERPL-CCNC: 65 U/L (ref 40–150)
ALT SERPL W P-5'-P-CCNC: 33 U/L (ref 0–50)
ANION GAP SERPL CALCULATED.3IONS-SCNC: 3 MMOL/L (ref 3–14)
AST SERPL W P-5'-P-CCNC: 25 U/L (ref 0–45)
BILIRUB SERPL-MCNC: 0.5 MG/DL (ref 0.2–1.3)
BUN SERPL-MCNC: 42 MG/DL (ref 7–30)
CALCIUM SERPL-MCNC: 9.8 MG/DL (ref 8.5–10.1)
CHLORIDE SERPL-SCNC: 105 MMOL/L (ref 94–109)
CO2 SERPL-SCNC: 31 MMOL/L (ref 20–32)
CREAT SERPL-MCNC: 1.41 MG/DL (ref 0.52–1.04)
GFR SERPL CREATININE-BSD FRML MDRD: 35 ML/MIN/1.7M2
GLUCOSE SERPL-MCNC: 97 MG/DL (ref 70–99)
POTASSIUM SERPL-SCNC: 4.3 MMOL/L (ref 3.4–5.3)
PROT SERPL-MCNC: 7.4 G/DL (ref 6.8–8.8)
SODIUM SERPL-SCNC: 139 MMOL/L (ref 133–144)

## 2017-11-01 PROCEDURE — 99214 OFFICE O/P EST MOD 30 MIN: CPT | Performed by: INTERNAL MEDICINE

## 2017-11-01 PROCEDURE — 80053 COMPREHEN METABOLIC PANEL: CPT | Performed by: INTERNAL MEDICINE

## 2017-11-01 PROCEDURE — 36415 COLL VENOUS BLD VENIPUNCTURE: CPT | Performed by: INTERNAL MEDICINE

## 2017-11-01 PROCEDURE — 73660 X-RAY EXAM OF TOE(S): CPT | Mod: RT

## 2017-11-01 NOTE — PATIENT INSTRUCTIONS
At [facility], we strive to deliver an exceptional experience to you, every time we see you.  If you receive a survey in the mail, please send us back your thoughts. We really do value your feedback.    Based on your medical history, these are the current health maintenance/preventive care services that you are due for (some may have been done at this visit.)  Health Maintenance Due   Topic Date Due     BMP Q6 MOS  09/24/2017         Suggested websites for health information:  Www.[employer].org : Up to date and easily searchable information on multiple topics.  Www.medlineplus.gov : medication info, interactive tutorials, watch real surgeries online  Www.familydoctor.org : good info from the Academy of Family Physicians  Www.cdc.gov : public health info, travel advisories, epidemics (H1N1)  Www.aap.org : children's health info, normal development, vaccinations  Www.health.[facility].[place] : [place] dept of health, public health issues in [place], N1N1    Your care team:                            Family Medicine Internal Medicine   MD Tre Hicks MD Shantel Branch-Fleming, MD Katya Georgiev PA-C Nam Ho, MD Pediatrics   ABY Snyder, CNP Mariann Phillips APRN CNP   MD Maritza Condon MD Deborah Mielke, MD Kim Thein, APRN CNP      Clinic hours: Monday - Thursday 7 am-7 pm; Fridays 7 am-5 pm.   Urgent care: Monday - Friday 11 am-9 pm; Saturday and Sunday 9 am-5 pm.  Pharmacy : Monday -Thursday 8 am-8 pm; Friday 8 am-6 pm; Saturday and Sunday 9 am-5 pm.     Clinic: [phone]   Pharmacy: [phone]

## 2017-11-01 NOTE — MR AVS SNAPSHOT
After Visit Summary   11/1/2017    Linda Spicer    MRN: [id number]           Patient Information     Date Of Birth          6/13/1931        Visit Information        Provider Department      11/1/2017 8:00 AM Tre Lockett MD [facility]        Today's Diagnoses     Pain of toe of right foot    -  1    Status post bunionectomy        Chronic kidney disease, stage 3 (moderate)          Care Instructions    At [facility], we strive to deliver an exceptional experience to you, every time we see you.  If you receive a survey in the mail, please send us back your thoughts. We really do value your feedback.    Based on your medical history, these are the current health maintenance/preventive care services that you are due for (some may have been done at this visit.)  Health Maintenance Due   Topic Date Due     BMP Q6 MOS  09/24/2017         Suggested websites for health information:  Www.City Notes : Up to date and easily searchable information on multiple topics.  Www.ConXtech.gov : medication info, interactive tutorials, watch real surgeries online  Www.familydoctor.org : good info from the Academy of Family Physicians  Www.cdc.gov : public health info, travel advisories, epidemics (H1N1)  Www.aap.org : children's health info, normal development, vaccinations  Www.health.state.[place].us : [place] dept of health, public health issues in [place], N1N1    Your care team:                            Family Medicine Internal Medicine   MD Tre Hicks MD Shantel Branch-Fleming, MD Katya Georgiev PA-C Nam Ho, MD Pediatrics   ABY Snyder, KAVEH Phillips APRN MD Maritza Gutierres MD Deborah Mielke, MD Kim Thein, SUNNI CNP      Clinic hours: Monday - Thursday 7 am-7 pm; Fridays 7 am-5 pm.   Urgent care: Monday - Friday 11 am-9 pm; Saturday and Sunday 9 am-5 pm.  Pharmacy : Monday -Thursday 8 am-8  pm; Friday 8 am-6 pm; Saturday and Sunday 9 am-5 pm.     Clinic: [phone]   Pharmacy: [phone]            Follow-ups after your visit        Additional Services     PODIATRY/FOOT & ANKLE SURGERY REFERRAL       Your provider has referred you to: FMG: [facility] [phone]   [email or web address]    Please be aware that coverage of these services is subject to the terms and limitations of your health insurance plan.  Call member services at your health plan with any benefit or coverage questions.      Please bring the following to your appointment:  >>   Any x-rays, CTs or MRIs which have been performed.  Contact the facility where they were done to arrange for  prior to your scheduled appointment.    >>   List of current medications   >>   This referral request   >>   Any documents/labs given to you for this referral                  Your next 10 appointments already scheduled     Nov 07, 2017  9:20 AM CST   [facility] with Mario Davenport MD   [facility]    [address] [facility] [address]   [phone]            Nov 08, 2017  1:00 PM CST   Return Visit with Darrell Donaldson DPM   [facility]    [address] [address]   [phone]            Nov 10, 2017  9:30 AM CST   Level 2 with [place] [facility] ([facility])    [address] [place] [address]   [phone]            Mar 12, 2018  2:10 PM CDT   Return Visit with Emeterio Loza MD   [facility])    [address] [place] [address]   [phone]            Apr 04, 2018  4:00 PM CDT   Return Visit with DEVICE CHECK RN CARD   [facility])    [address]  "[address] [place] [address] [phone]              Who to contact     If you have questions or need follow up information about today's clinic visit or your schedule please contact [facility] [place] directly at [phone].  Normal or non-critical lab and imaging results will be communicated to you by MyChart, letter or phone within 4 business days after the clinic has received the results. If you do not hear from us within 7 days, please contact the clinic through [employer] or phone. If you have a critical or abnormal lab result, we will notify you by phone as soon as possible.  Submit refill requests through KnowRe or call your pharmacy and they will forward the refill request to us. Please allow 3 business days for your refill to be completed.          Additional Information About Your Visit        MyChart Information     KnowRe gives you secure access to your electronic health record. If you see a primary care provider, you can also send messages to your care team and make appointments. If you have questions, please call your primary care clinic.  If you do not have a primary care provider, please call [phone] and they will assist you.        Care EveryWhere ID     This is your Care EveryWhere ID. This could be used by other organizations to access your [facility] medical records  [id number]        Your Vitals Were     Pulse Temperature Height Pulse Oximetry BMI (Body Mass Index)       66 98.1  F (36.7  C) (Oral) 5' 3\" (1.6 m) 98% 27.99 kg/m2        Blood Pressure from Last 3 Encounters:   11/01/17 136/59   10/13/17 131/64   09/16/17 128/69    Weight from Last 3 Encounters:   11/01/17 158 lb (71.7 kg)   10/13/17 159 lb 14.4 oz (72.5 kg)   09/16/17 158 lb (71.7 kg)              We Performed the Following     Comprehensive metabolic panel (BMP + Alb, Alk Phos, ALT, AST, Total. Bili, TP)     PODIATRY/FOOT & ANKLE SURGERY REFERRAL     XR Toe Right G/E 2 Views          Today's " Medication Changes          These changes are accurate as of: 11/1/17  8:51 AM.  If you have any questions, ask your nurse or doctor.               These medicines have changed or have updated prescriptions.        Dose/Directions    metoprolol 25 MG 24 hr tablet   Commonly known as:  TOPROL-XL   This may have changed:    - how much to take  - when to take this   Used for:  Chronic atrial fibrillation (H)        Dose:  25 mg   Take 1 tablet (25 mg) by mouth every morning   Quantity:  90 tablet   Refills:  3                Primary Care Provider Office Phone # Fax #    Tre Lockett [phone]       [address]  [facility] [address]        Equal Access to Services     [facility] Hospital[facility]AM[facility]ON : Hadii rakesh gallegos Sojordin, waaxda luqadaha, qaybta kaalmada adenicolasyada, zahraa moss . So [facility] [phone].    ATENCIÓN: Si habla español, tiene a merchant disposición servicios gratuitos de asistencia lingüística. Llame al [phone].    We comply with applicable federal civil rights laws and [place] laws. We do not discriminate on the basis of race, color, national origin, age, disability, sex, sexual orientation, or gender identity.            Thank you!     Thank you for choosing [facility]  for your care. Our goal is always to provide you with excellent care. Hearing back from our patients is one way we can continue to improve our services. Please take a few minutes to complete the written survey that you may receive in the mail after your visit with us. Thank you!             Your Updated Medication List - Protect others around you: Learn how to safely use, store and throw away your medicines at www.disposemymeds.org.          This list is accurate as of: 11/1/17  8:51 AM.  Always use your most recent med list.                   Brand Name Dispense Instructions for use Diagnosis    apixaban ANTICOAGULANT 2.5 MG tablet    ELIQUIS    60 tablet    Take 1  tablet (2.5 mg) by mouth 2 times daily    H/O atrial flutter       azaTHIOprine 50 MG tablet    IMURAN    90 tablet    Take 1 tablet (50 mg) by mouth daily    Rheumatoid arthritis involving multiple sites with positive rheumatoid factor (H), High risk medication use       bismuth subsalicylate 262 MG Tabs     80 tablet    Take 1 tablet by mouth every 4 hours as needed    Acute infectious diarrhea, Enteritis due to Norovirus       Blood Pressure Monitor Kit     1 kit    1 kit daily as needed    CHF (congestive heart failure) (H)       calcium-vitamin D 600-400 MG-UNIT per tablet    CALTRATE     Take 1 tablet by mouth 2 times daily        cholestyramine 4 G Packet    QUESTRAN    60 each    Take 1 packet (4 g) by mouth 2 times daily (with meals)    Acute diarrhea       ciprofloxacin 250 MG tablet    CIPRO    90 tablet    Take 1 tablet (250 mg) by mouth daily    Chronic UTI       Cranberry 180 MG Caps      Take 1 capsule by mouth daily Unsure of strength        fish oil-omega-3 fatty acids 1000 MG capsule      Take 2 g by mouth daily. 2 capsules daily        FLAGYL PO      Take 500 mg by mouth 2 times daily    Acute infectious diarrhea       folic acid 1 MG tablet    FOLVITE    100 tablet    Take 1 tablet (1 mg) by mouth daily    Oral aphthae       GENTEAL MILD OP      Apply  to eye daily.        IBANdronate 150 MG tablet    BONIVA    1 tablet    Take 1 tablet (150 mg) by mouth every 30 days Take 60 minutes before am meal with 8 oz. water. Remain upright for 30 minutes.    Osteopenia       levothyroxine 75 MCG tablet    SYNTHROID/LEVOTHROID    90 tablet    TAKE ONE TABLET BY MOUTH EVERY DAY    Hypothyroidism, unspecified type       LOSARTAN POTASSIUM PO      Take 25 mg by mouth daily (with dinner) Hold if SBP < 110.        * LUCENTIS IO      1 injection every 4 Weeks        * ranibizumab 0.3 MG/0.05ML Soln    LUCENTIS     0.3 mg by Intravitreal route        metoprolol 25 MG 24 hr tablet    TOPROL-XL    90 tablet    Take  1 tablet (25 mg) by mouth every morning    Chronic atrial fibrillation (H)       nitroGLYcerin 0.4 MG sublingual tablet    NITROSTAT    25 tablet    Place 1 tablet (0.4 mg) under the tongue every 5 minutes as needed for chest pain    Chest pain       * order for DME     1 Box    Equipment being ordered: Dispense face mask. Mrs. Spicer is immunosuppressed due to rheumatoid arthritis.    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 Use with Albuterol.    Hypoxia       order for DME     1 each    Equipment being ordered: Dispense baffle, for use with nebulizer.    Pneumonia of left upper lobe due to Mycoplasma pneumoniae       PRESERVISION AREDS PO      Take 2 tablets by mouth daily        ranitidine 150 MG tablet    ZANTAC    60 tablet    Take 1 tablet (150 mg) by mouth 2 times daily    Gastroesophageal reflux disease without esophagitis       REFRESH P.M. Oint      Apply  to eye. Daily at bedtime        saccharomyces boulardii 250 MG capsule    FLORASTOR     Take 250 mg by mouth        senna-docusate 8.6-50 MG per tablet    SENOKOT-S;PERICOLACE    120 tablet    Take 2 tablets by mouth At Bedtime Hold if diarrhea occurs.    Constipation, chronic       SPIRONOLACTONE PO      Take 12.5 mg by mouth every morning Hold if SBP is less than 120.    Congestive heart failure with preserved LV function, NYHA class 3 (H)       triamcinolone 0.1 % cream    KENALOG    453.6 g    Apply sparingly to affected area on face three times daily.    Facial lesion       ZYRTEC ALLERGY 10 MG tablet   Generic drug:  cetirizine      Take 10 mg by mouth At Bedtime        * Notice:  This list has 4 medication(s) that are the same as other medications prescribed for you. Read the directions carefully, and ask your doctor or other care provider to review them with you.

## 2017-11-01 NOTE — NURSING NOTE
"Chief Complaint   Patient presents with     Musculoskeletal Problem     toe on right foot problem       Initial /59 (BP Location: Left arm, Patient Position: Chair, Cuff Size: Adult Regular)  Pulse 66  Temp 98.1  F (36.7  C) (Oral)  Ht 5' 3\" (1.6 m)  Wt 158 lb (71.7 kg)  SpO2 98%  BMI 27.99 kg/m2 Estimated body mass index is 27.99 kg/(m^2) as calculated from the following:    Height as of this encounter: 5' 3\" (1.6 m).    Weight as of this encounter: 158 lb (71.7 kg).  Medication Reconciliation: complete     Jorge Forrester MA      "

## 2017-11-03 ENCOUNTER — MYC REFILL (OUTPATIENT)
Dept: FAMILY MEDICINE | Facility: CLINIC | Age: 82
End: 2017-11-03

## 2017-11-03 ENCOUNTER — CARE COORDINATION (OUTPATIENT)
Dept: CARE COORDINATION | Facility: CLINIC | Age: 82
End: 2017-11-03

## 2017-11-03 ENCOUNTER — OFFICE VISIT (OUTPATIENT)
Dept: PODIATRY | Facility: CLINIC | Age: 82
End: 2017-11-03
Payer: MEDICARE

## 2017-11-03 VITALS — WEIGHT: 158 LBS | OXYGEN SATURATION: 98 % | HEART RATE: 65 BPM | BODY MASS INDEX: 27.99 KG/M2

## 2017-11-03 DIAGNOSIS — L84 CALLUS: ICD-10-CM

## 2017-11-03 DIAGNOSIS — M79.671 RIGHT FOOT PAIN: Primary | ICD-10-CM

## 2017-11-03 DIAGNOSIS — K21.9 GASTROESOPHAGEAL REFLUX DISEASE WITHOUT ESOPHAGITIS: ICD-10-CM

## 2017-11-03 DIAGNOSIS — M05.79 RHEUMATOID ARTHRITIS INVOLVING MULTIPLE SITES WITH POSITIVE RHEUMATOID FACTOR (H): ICD-10-CM

## 2017-11-03 DIAGNOSIS — N18.30 CKD (CHRONIC KIDNEY DISEASE) STAGE 3, GFR 30-59 ML/MIN (H): ICD-10-CM

## 2017-11-03 PROCEDURE — 99213 OFFICE O/P EST LOW 20 MIN: CPT | Mod: 25 | Performed by: PODIATRIST

## 2017-11-03 PROCEDURE — 11055 PARING/CUTG B9 HYPRKER LES 1: CPT | Mod: Q8 | Performed by: PODIATRIST

## 2017-11-03 NOTE — PATIENT INSTRUCTIONS
We wish you continued good healing. If you have any questions or concerns, please do not hesitate to contact us at [phone]      Please remember to call and schedule a follow up appointment if one was recommended at your earliest convenience.   PODIATRY CLINIC HOURS  TELEPHONE NUMBER    Dr. Darrell Donaldson D.P.M [facility]    Clinics:  [facility]        Chloé Orozco MA  Medical Assistant  Tuesday 1PM-6PM  [facility]  Wednesday 7AM-2PM  [place]  Thursday 10AM-6PM  [place] Friday 7AM-345PM  [place]  Specialty schedulers:   [phone] to make an appointment with any Specialty Provider.        Urgent Care locations:    [facility] Monday-Friday 5 pm - 9 pm. Saturday-Sunday 9 am -5pm    Monday-Friday 11 am - 9 pm Saturday 9 am - 5 pm     Monday-Sunday 12 noon-8PM [phone]     If you need a medication refill, please contact us you may need lab work and/or a follow up visit prior to your refill (i.e. Antifungal medications).    [employer] (secure e-mail communication and access to your chart) to send a message or to make an appointment.    If MRI needed please call Darnell Sanchez at [phone]        Weight management plan: Patient was referred to their PCP to discuss a diet and exercise plan.

## 2017-11-03 NOTE — PROGRESS NOTES
Clinic Care Coordination Contact  Care Team Conversations    Patient called into RN CC to inform writer she would be changing insurance from [employer] to [employer] starting 1/1/18.  Patient states her pharmacy will then be changing to [employer] [facility], which include [facility] and [facility].  Patient informed writer she would like to begin using the [facility] in [place], but may want to change to a mail order pharmacy in the future.  Patient inquired how to have her prescriptions changed over.  RN CC advised patient to contact the pharmacy she wishes to change to and to request a change.  Patient verbalized understanding.  RN CC updated patient's chart on preferred pharmacy.    Melissa Behl BSN, RN, PHN  [facility] Care Coordinator  [phone]  [email or web address]

## 2017-11-03 NOTE — MR AVS SNAPSHOT
After Visit Summary   11/3/2017    Linda Spicer    MRN: [id number]           Patient Information     Date Of Birth          6/13/1931        Visit Information        Provider Department      11/3/2017 9:30 AM Darrell Donaldson DPM [facility]        Care Instructions    We wish you continued good healing. If you have any questions or concerns, please do not hesitate to contact us at [phone]      Please remember to call and schedule a follow up appointment if one was recommended at your earliest convenience.   PODIATRY CLINIC HOURS  TELEPHONE NUMBER    Dr. Darrell Donaldson D.P.M FAC FAS    Clinics:  [facility]        Chloé Orozco MA  Medical Assistant  Tuesday 1PM-6PM  [facility]  Wednesday 7AM-2PM  [place]  Thursday 10AM-6PM  [place] Friday 7AM-345PM  [place]  Specialty schedulers:   [phone] to make an appointment with any Specialty Provider.        Urgent Care locations:    [facility] Monday-Friday 5 pm - 9 pm. Saturday-Sunday 9 am -5pm    Monday-Friday 11 am - 9 pm Saturday 9 am - 5 pm     Monday-Sunday 12 noon-8PM [phone]     If you need a medication refill, please contact us you may need lab work and/or a follow up visit prior to your refill (i.e. Antifungal medications).    [employer] (secure e-mail communication and access to your chart) to send a message or to make an appointment.    If MRI needed please call Darnell Sanchez at [phone]        Weight management plan: Patient was referred to their PCP to discuss a diet and exercise plan.            Follow-ups after your visit        Your next 10 appointments already scheduled     Nov 03, 2017  9:30 AM CDT   New Visit with Darrell Donaldson DPM   [facility] ([facility])    [address] [place] [address]  [facility] [address]   [phone]            Nov 07, 2017  9:20 AM CST   Return Visit with Mario Davenport MD   [facility] ([facility])    [address] [facility] [address]   [phone]            Nov 08, 2017  1:00 PM CST   Return Visit with Darrell Donaldson DPM   [facility] ([facility])    [address] [facility] [address]   [phone]            Nov 10, 2017  9:30 AM CST   Level 2 with BAY 8 INFUSION   [facility]    [address] [place] [address] [phone]            Mar 12, 2018  2:10 PM CDT   Return Visit with Emeterio Loza MD   [facility] ([facility])    [address] [place] [address] [phone]            Apr 04, 2018  4:00 PM CDT   Return Visit with DEVICE CHECK RN CARD   [facility] ([facility])    [address] [place] [address] [phone]              Who to contact     If you have questions or need follow up information about today's clinic visit or your schedule please contact [facility] directly at [phone].  Normal or non-critical lab and imaging results will be communicated to you by [employer], letter or phone within 4 business days after the clinic has received the results. If you do not hear from us within 7 days, please contact the clinic through [employer] or phone. If you have a critical or abnormal lab result, we will notify you by phone as soon as possible.  Submit refill requests through [employer] or call your pharmacy and they will forward the refill request to us. Please allow 3 business days for your refill to be completed.          Additional Information About Your Visit        Omiro Information     [employer] gives you secure access to your electronic health record.  If you see a primary care provider, you can also send messages to your care team and make appointments. If you have questions, please call your primary care clinic.  If you do not have a primary care provider, please call [phone] and they will assist you.        Care EveryWhere ID     This is your Care EveryWhere ID. This could be used by other organizations to access your [facility] medical records  [id number]        Your Vitals Were     Pulse Pulse Oximetry BMI (Body Mass Index)             65 98% 27.99 kg/m2          Blood Pressure from Last 3 Encounters:   11/01/17 136/59   10/13/17 131/64   09/16/17 128/69    Weight from Last 3 Encounters:   11/03/17 71.7 kg (158 lb)   11/01/17 71.7 kg (158 lb)   10/13/17 72.5 kg (159 lb 14.4 oz)              Today, you had the following     No orders found for display         Today's Medication Changes          These changes are accurate as of: 11/3/17  9:24 AM.  If you have any questions, ask your nurse or doctor.               These medicines have changed or have updated prescriptions.        Dose/Directions    metoprolol 25 MG 24 hr tablet   Commonly known as:  TOPROL-XL   This may have changed:    - how much to take  - when to take this   Used for:  Chronic atrial fibrillation (H)        Dose:  25 mg   Take 1 tablet (25 mg) by mouth every morning   Quantity:  90 tablet   Refills:  3                Primary Care Provider Office Phone # Fax #    Tre Lockett [phone]       [address]  [facility] [address]        Equal Access to Services     [facility] AH: Hadii aad ku hadasho Soomaali, waaxda luqadaha, qaybta kaalmada adeegyada, zahraa skinner. So [facility] [phone].    ATENCIÓN: Si habla español, tiene a merchant disposición servicios gratuitos de asistencia lingüística. Llame al [phone].    We comply with applicable federal civil rights laws and [place] laws. We do not discriminate on the basis of race,  color, national origin, age, disability, sex, sexual orientation, or gender identity.            Thank you!     Thank you for choosing [facility]  for your care. Our goal is always to provide you with excellent care. Hearing back from our patients is one way we can continue to improve our services. Please take a few minutes to complete the written survey that you may receive in the mail after your visit with us. Thank you!             Your Updated Medication List - Protect others around you: Learn how to safely use, store and throw away your medicines at www.disposemymeds.org.          This list is accurate as of: 11/3/17  9:24 AM.  Always use your most recent med list.                   Brand Name Dispense Instructions for use Diagnosis    apixaban ANTICOAGULANT 2.5 MG tablet    ELIQUIS    60 tablet    Take 1 tablet (2.5 mg) by mouth 2 times daily    H/O atrial flutter       azaTHIOprine 50 MG tablet    IMURAN    90 tablet    Take 1 tablet (50 mg) by mouth daily    Rheumatoid arthritis involving multiple sites with positive rheumatoid factor (H), High risk medication use       bismuth subsalicylate 262 MG Tabs     80 tablet    Take 1 tablet by mouth every 4 hours as needed    Acute infectious diarrhea, Enteritis due to Norovirus       Blood Pressure Monitor Kit     1 kit    1 kit daily as needed    CHF (congestive heart failure) (H)       calcium-vitamin D 600-400 MG-UNIT per tablet    CALTRATE     Take 1 tablet by mouth 2 times daily        cholestyramine 4 G Packet    QUESTRAN    60 each    Take 1 packet (4 g) by mouth 2 times daily (with meals)    Acute diarrhea       ciprofloxacin 250 MG tablet    CIPRO    90 tablet    Take 1 tablet (250 mg) by mouth daily    Chronic UTI       Cranberry 180 MG Caps      Take 1 capsule by mouth daily Unsure of strength        fish oil-omega-3 fatty acids 1000 MG capsule      Take 2 g by mouth daily. 2 capsules daily        FLAGYL PO      Take 500 mg by  mouth 2 times daily    Acute infectious diarrhea       folic acid 1 MG tablet    FOLVITE    100 tablet    Take 1 tablet (1 mg) by mouth daily    Oral aphthae       GENTEAL MILD OP      Apply  to eye daily.        IBANdronate 150 MG tablet    BONIVA    1 tablet    Take 1 tablet (150 mg) by mouth every 30 days Take 60 minutes before am meal with 8 oz. water. Remain upright for 30 minutes.    Osteopenia       levothyroxine 75 MCG tablet    SYNTHROID/LEVOTHROID    90 tablet    TAKE ONE TABLET BY MOUTH EVERY DAY    Hypothyroidism, unspecified type       LOSARTAN POTASSIUM PO      Take 25 mg by mouth daily (with dinner) Hold if SBP < 110.        * LUCENTIS IO      1 injection every 4 Weeks        * ranibizumab 0.3 MG/0.05ML Soln    LUCENTIS     0.3 mg by Intravitreal route        metoprolol 25 MG 24 hr tablet    TOPROL-XL    90 tablet    Take 1 tablet (25 mg) by mouth every morning    Chronic atrial fibrillation (H)       nitroGLYcerin 0.4 MG sublingual tablet    NITROSTAT    25 tablet    Place 1 tablet (0.4 mg) under the tongue every 5 minutes as needed for chest pain    Chest pain       * order for DME     1 Box    Equipment being ordered: Dispense face mask. Mrs. Spicer is immunosuppressed due to rheumatoid arthritis.    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 Use with Albuterol.    Hypoxia       order for DME     1 each    Equipment being ordered: Dispense baffle, for use with nebulizer.    Pneumonia of left upper lobe due to Mycoplasma pneumoniae       * order for DME     1 each    Dispense SP Walker-small    Pain of toe of right foot, Status post bunionectomy       PRESERVISION AREDS PO      Take 2 tablets by mouth daily        ranitidine 150 MG tablet    ZANTAC    60 tablet    Take 1 tablet (150 mg) by mouth 2 times daily    Gastroesophageal reflux disease without esophagitis       REFRESH P.M. Oint       Apply  to eye. Daily at bedtime        saccharomyces boulardii 250 MG capsule    FLORASTOR     Take 250 mg by mouth        senna-docusate 8.6-50 MG per tablet    SENOKOT-S;PERICOLACE    120 tablet    Take 2 tablets by mouth At Bedtime Hold if diarrhea occurs.    Constipation, chronic       SPIRONOLACTONE PO      Take 12.5 mg by mouth every morning Hold if SBP is less than 120.    Congestive heart failure with preserved LV function, NYHA class 3 (H)       triamcinolone 0.1 % cream    KENALOG    453.6 g    Apply sparingly to affected area on face three times daily.    Facial lesion       ZYRTEC ALLERGY 10 MG tablet   Generic drug:  cetirizine      Take 10 mg by mouth At Bedtime        * Notice:  This list has 5 medication(s) that are the same as other medications prescribed for you. Read the directions carefully, and ask your doctor or other care provider to review them with you.

## 2017-11-03 NOTE — LETTER
11/3/2017         RE: Linda Spicer  [address]   [facility] [address]        Dear Colleague,    Thank you for referring your patient, Linda Spicer, to the [facility]. Please see a copy of my visit note below.    Subjective:    Pt is seen today in consult from Dr. Lockett for right foot pain.  This started 6 weeks ago when she got new orthotics.  Had these adjusted 3 times but did not help. The pain is aggravated by activity and relieved by rest.pain in area of dorsal 3/4/5 MTPJs  Patient also has callus which in new on right fifth toe lateral, painful.  Describes it as a burning pain which is aggravated by activity and relieved by rest.  Primary care is Dr. Lockett last seen 11/1/17.  She was given ankle high air cast by Dr. Lockett.  No pain when wearing this and pain starting to subside.  History of RA and right fifth met head resection in the past.  She has gone back to wearing old orthotics.            ROS:  denies numbness, erythema, or fever and chills.  Denies foot ulcers.       Allergies   Allergen Reactions     Cephalexin Hcl Diarrhea     Gabapentin Other (See Comments)     Dizzsiness     Naproxen GI Disturbance     Perfume      Lactase Other (See Comments)     Macrobid [Nitrofurantoin Anhydrous]      Possibly related to lung disease      Sulfa Drugs      Throat swelling     Xanax [Alprazolam] Other (See Comments)     Dizziness        Current Outpatient Prescriptions   Medication Sig Dispense Refill     order for DME Dispense SP Walker-alberta 1 each 0     ranibizumab (LUCENTIS) 0.3 MG/0.05ML SOLN 0.3 mg by Intravitreal route       metoprolol (TOPROL-XL) 25 MG 24 hr tablet Take 1 tablet (25 mg) by mouth every morning (Patient taking differently: Take 12.5 mg by mouth 2 times daily ) 90 tablet 3     LOSARTAN POTASSIUM PO Take 25 mg by mouth daily (with dinner) Hold if SBP < 110.        azaTHIOprine (IMURAN) 50 MG tablet Take 1 tablet (50 mg) by mouth daily 90  tablet 1     ranitidine (ZANTAC) 150 MG tablet Take 1 tablet (150 mg) by mouth 2 times daily 60 tablet 5     cholestyramine (QUESTRAN) 4 G Packet Take 1 packet (4 g) by mouth 2 times daily (with meals) 60 each 5     IBANdronate (BONIVA) 150 MG tablet Take 1 tablet (150 mg) by mouth every 30 days Take 60 minutes before am meal with 8 oz. water. Remain upright for 30 minutes. 1 tablet 11     levothyroxine (SYNTHROID/LEVOTHROID) 75 MCG tablet TAKE ONE TABLET BY MOUTH EVERY DAY 90 tablet 1     folic acid (FOLVITE) 1 MG tablet Take 1 tablet (1 mg) by mouth daily 100 tablet 1     bismuth subsalicylate 262 MG TABS Take 1 tablet by mouth every 4 hours as needed 80 tablet 1     MetroNIDAZOLE (FLAGYL PO) Take 500 mg by mouth 2 times daily        ciprofloxacin (CIPRO) 250 MG tablet Take 1 tablet (250 mg) by mouth daily 90 tablet 3     SPIRONOLACTONE PO Take 12.5 mg by mouth every morning Hold if SBP is less than 120.       apixaban ANTICOAGULANT (ELIQUIS) 2.5 MG tablet Take 1 tablet (2.5 mg) by mouth 2 times daily 60 tablet 11     senna-docusate (SENOKOT-S;PERICOLACE) 8.6-50 MG per tablet Take 2 tablets by mouth At Bedtime Hold if diarrhea occurs. 120 tablet 11     Cranberry 180 MG CAPS Take 1 capsule by mouth daily Unsure of strength       saccharomyces boulardii (FLORASTOR) 250 MG capsule Take 250 mg by mouth       order for DME Equipment being ordered: Dispense baffle, for use with nebulizer. 1 each 0     order for DME Equipment being ordered: Nebulizer. Use with Albuterol. 1 each 0     order for DME Equipment being ordered: Dispense face mask.  Mrs. Spicer is immunosuppressed due to rheumatoid arthritis. 1 Box 11     triamcinolone (KENALOG) 0.1 % cream Apply sparingly to affected area on face three times daily. 453.6 g 5     Multiple Vitamins-Minerals (PRESERVISION AREDS PO) Take 2 tablets by mouth daily       Blood Pressure Monitor KIT 1 kit daily as needed 1 kit 0     nitroglycerin (NITROSTAT) 0.4 MG SL tablet Place 1  tablet (0.4 mg) under the tongue every 5 minutes as needed for chest pain 25 tablet 0     Ranibizumab (LUCENTIS IO) 1 injection every 4 Weeks       cetirizine (ZYRTEC ALLERGY) 10 MG tablet Take 10 mg by mouth At Bedtime       Hypromellose (GENTEAL MILD OP) Apply  to eye daily.       Artificial Tear Ointment (REFRESH P.M.) OINT Apply  to eye. Daily at bedtime          calcium-vitamin D (CALTRATE) 600-400 MG-UNIT per tablet Take 1 tablet by mouth 2 times daily        fish oil-omega-3 fatty acids (FISH OIL) 1000 MG capsule Take 2 g by mouth daily. 2 capsules daily              Patient Active Problem List   Diagnosis     ACP (advance care planning)     Hypertension goal BP (blood pressure) < 150/90     Hypothyroid     Diffuse idiopathic pulmonary fibrosis (H)     Macular degeneration, left eye     Irritable bowel syndrome     Encounter for palliative care     Adjustment disorder with anxious mood     Mild anemia     DDD (degenerative disc disease), lumbar     CKD (chronic kidney disease) stage 3, GFR 30-59 ml/min     History of blood transfusion     Aftercare following surgery     S/P lumbar laminectomy     High risk medication use     Osteopenia     Atrophic vaginitis     Fecal incontinence     Female stress incontinence     Impingement syndrome of both shoulders     UIP (usual interstitial pneumonitis) (H)     High risk medications (not anticoagulants) long-term use     Heart failure with preserved ejection fraction (H)     Congestive heart failure with preserved LV function, NYHA class 3 (H)     Other specified hypothyroidism     Rheumatoid arthritis involving multiple sites with positive rheumatoid factor (H)     Health Care Home     Sick sinus syndrome (H)     Cardiac pacemaker - Medtronic dual lead pacemaker - Not Dependent - MRI Safe       Past Medical History:   Diagnosis Date     Adjustment disorder with anxious mood 5/18/2015     Advanced directives, counseling/discussion 8/30/2012    Patient states has  Advance Directive and will bring in a copy to clinic. 8/30/2012   [facility]evin [facility] Medical Assistant \       Anemia 9/25/2015     Basal cell cancer      CHF (congestive heart failure) (H) 9/18/2014     CKD (chronic kidney disease) stage 3, GFR 30-59 ml/min 9/29/2015     DDD (degenerative disc disease), lumbar 9/25/2015     Diffuse idiopathic pulmonary fibrosis (H) 5/6/2013     Encounter for palliative care 5/18/2015     History of blood transfusion 9/29/2015     Hypertension goal BP (blood pressure) < 140/90 9/7/2012     Hypothyroid 9/7/2012     Irritable bowel syndrome 10/29/2013     Macular degeneration      Macular degeneration, 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     Nondisplaced spiral fracture of shaft of humerus      Osteoporosis 8/13/2013     Imo Update utility     RA (rheumatoid arthritis) (H) 5/7/2013     Rheumatic fever      Shingles      Spinal stenosis of lumbar region with neurogenic claudication 9/14/2015       Past Surgical History:   Procedure Laterality Date     APPENDECTOMY       BIOPSY      hemorrhoidectomy     ENT SURGERY      tonsillectomy     GYN SURGERY      3 D & C's     HYSTERECTOMY, PAP NO LONGER INDICATED       LAMINECTOMY LUMBAR ONE LEVEL N/A 10/13/2015    Procedure: LAMINECTOMY LUMBAR ONE LEVEL;  Surgeon: Fransico Toussaint MD;  Location:  OR       Family History   Problem Relation Age of Onset     Hypertension Mother      Psychotic Disorder Father      DIABETES Son      DIABETES Daughter      Blood Disease Daughter        Social History   Substance Use Topics     Smoking status: Never Smoker     Smokeless tobacco: Never Used     Alcohol use Yes      Comment: rare wine          Exam:    Pulse 65  Wt 71.7 kg (158 lb)  SpO2 98%  BMI 27.99 kg/m2.  Patient a good historian.  A&O X 3.  Sen with daughter.  Pulses DP 1/4, PT 0/4 b/l.  CRT < 3 seconds X 10 digits.  Bilateral edema or varicosities noted.  5.07 monofilament  intact b/l.  Reflexes 2/4 b/l.  Skin thin, shiny, and atrophic with no hair  growth noted b/l.  Normal arch with weightbearing.  No forefoot or rear foot deformities noted.  MS 5/5 all compartments.  Normal ROM all fore foot and rearfoot joints.  Right  fifth toe is flail.  Patient has callus on right fifth toe lateral.  Underlying tissue healthy.  Pain dorsum of 5/4/3 MTPJ.  No plantar pain.  No pain with vigorous loading of any met heads.  No masses or breakdown in the skin bilaterally.  No erythema or ecchymosis noted bilateral.     XR TOE RIGHT G/E 2 VIEWS 11/1/2017 8:38 AM     HISTORY: Right toe pain.     COMPARISON: 5/15/2013     FINDINGS: Stable changes to the distal fifth metatarsal. No acute  fracture or malalignment. The appearance of the lateral right forefoot  is not significantly changed from 5/15/2013.         IMPRESSION: No acute abnormality.    A/P  Right lateral forefoot pain from pressure  CKD stage 3  Calluses x 1    Personally reviewed x-rays.   Callus debrided with a fifteen blade.   She will keep this down with a pumice stone or use donut pad.  Explained  how her callus and foot pain from pressure with new orthotics taking up to much room in shoe.  She will not use these anymore.  Continue air cast until pain resolved.  Another option is cutting hole in old shoe over this area.    Return to clinic prn.  Thank you for allowing me participate in the care of this patient.          Darrell Donaldson DPM, FACFAS             Again, thank you for allowing me to participate in the care of your patient.        Sincerely,        Darrell Donaldson DPM

## 2017-11-03 NOTE — PROGRESS NOTES
Subjective:    Pt is seen today in consult from Dr. Lockett for right foot pain.  This started 6 weeks ago when she got new orthotics.  Had these adjusted 3 times but did not help. The pain is aggravated by activity and relieved by rest.pain in area of dorsal 3/4/5 MTPJs  Patient also has callus which in new on right fifth toe lateral, painful.  Describes it as a burning pain which is aggravated by activity and relieved by rest.  Primary care is Dr. Lockett last seen 11/1/17.  She was given ankle high air cast by Dr. Lockett.  No pain when wearing this and pain starting to subside.  History of RA and right fifth met head resection in the past.  She has gone back to wearing old orthotics.            ROS:  denies numbness, erythema, or fever and chills.  Denies foot ulcers.       Allergies   Allergen Reactions     Cephalexin Hcl Diarrhea     Gabapentin Other (See Comments)     Dizzsiness     Naproxen GI Disturbance     Perfume      Lactase Other (See Comments)     Macrobid [Nitrofurantoin Anhydrous]      Possibly related to lung disease      Sulfa Drugs      Throat swelling     Xanax [Alprazolam] Other (See Comments)     Dizziness        Current Outpatient Prescriptions   Medication Sig Dispense Refill     order for DME Dispense SP Walker-small 1 each 0     ranibizumab (LUCENTIS) 0.3 MG/0.05ML SOLN 0.3 mg by Intravitreal route       metoprolol (TOPROL-XL) 25 MG 24 hr tablet Take 1 tablet (25 mg) by mouth every morning (Patient taking differently: Take 12.5 mg by mouth 2 times daily ) 90 tablet 3     LOSARTAN POTASSIUM PO Take 25 mg by mouth daily (with dinner) Hold if SBP < 110.        azaTHIOprine (IMURAN) 50 MG tablet Take 1 tablet (50 mg) by mouth daily 90 tablet 1     ranitidine (ZANTAC) 150 MG tablet Take 1 tablet (150 mg) by mouth 2 times daily 60 tablet 5     cholestyramine (QUESTRAN) 4 G Packet Take 1 packet (4 g) by mouth 2 times daily (with meals) 60 each 5     IBANdronate (BONIVA) 150 MG tablet Take 1 tablet (150  mg) by mouth every 30 days Take 60 minutes before am meal with 8 oz. water. Remain upright for 30 minutes. 1 tablet 11     levothyroxine (SYNTHROID/LEVOTHROID) 75 MCG tablet TAKE ONE TABLET BY MOUTH EVERY DAY 90 tablet 1     folic acid (FOLVITE) 1 MG tablet Take 1 tablet (1 mg) by mouth daily 100 tablet 1     bismuth subsalicylate 262 MG TABS Take 1 tablet by mouth every 4 hours as needed 80 tablet 1     MetroNIDAZOLE (FLAGYL PO) Take 500 mg by mouth 2 times daily        ciprofloxacin (CIPRO) 250 MG tablet Take 1 tablet (250 mg) by mouth daily 90 tablet 3     SPIRONOLACTONE PO Take 12.5 mg by mouth every morning Hold if SBP is less than 120.       apixaban ANTICOAGULANT (ELIQUIS) 2.5 MG tablet Take 1 tablet (2.5 mg) by mouth 2 times daily 60 tablet 11     senna-docusate (SENOKOT-S;PERICOLACE) 8.6-50 MG per tablet Take 2 tablets by mouth At Bedtime Hold if diarrhea occurs. 120 tablet 11     Cranberry 180 MG CAPS Take 1 capsule by mouth daily Unsure of strength       saccharomyces boulardii (FLORASTOR) 250 MG capsule Take 250 mg by mouth       order for DME Equipment being ordered: Dispense baffle, for use with nebulizer. 1 each 0     order for DME Equipment being ordered: Nebulizer. Use with Albuterol. 1 each 0     order for DME Equipment being ordered: Dispense face mask.  Mrs. Spicer is immunosuppressed due to rheumatoid arthritis. 1 Box 11     triamcinolone (KENALOG) 0.1 % cream Apply sparingly to affected area on face three times daily. 453.6 g 5     Multiple Vitamins-Minerals (PRESERVISION AREDS PO) Take 2 tablets by mouth daily       Blood Pressure Monitor KIT 1 kit daily as needed 1 kit 0     nitroglycerin (NITROSTAT) 0.4 MG SL tablet Place 1 tablet (0.4 mg) under the tongue every 5 minutes as needed for chest pain 25 tablet 0     Ranibizumab (LUCENTIS IO) 1 injection every 4 Weeks       cetirizine (ZYRTEC ALLERGY) 10 MG tablet Take 10 mg by mouth At Bedtime       Hypromellose (GENTEAL MILD OP) Apply  to eye  daily.       Artificial Tear Ointment (REFRESH P.M.) OINT Apply  to eye. Daily at bedtime          calcium-vitamin D (CALTRATE) 600-400 MG-UNIT per tablet Take 1 tablet by mouth 2 times daily        fish oil-omega-3 fatty acids (FISH OIL) 1000 MG capsule Take 2 g by mouth daily. 2 capsules daily              Patient Active Problem List   Diagnosis     ACP (advance care planning)     Hypertension goal BP (blood pressure) < 150/90     Hypothyroid     Diffuse idiopathic pulmonary fibrosis (H)     Macular degeneration, left eye     Irritable bowel syndrome     Encounter for palliative care     Adjustment disorder with anxious mood     Mild anemia     DDD (degenerative disc disease), lumbar     CKD (chronic kidney disease) stage 3, GFR 30-59 ml/min     History of blood transfusion     Aftercare following surgery     S/P lumbar laminectomy     High risk medication use     Osteopenia     Atrophic vaginitis     Fecal incontinence     Female stress incontinence     Impingement syndrome of both shoulders     UIP (usual interstitial pneumonitis) (H)     High risk medications (not anticoagulants) long-term use     Heart failure with preserved ejection fraction (H)     Congestive heart failure with preserved LV function, NYHA class 3 (H)     Other specified hypothyroidism     Rheumatoid arthritis involving multiple sites with positive rheumatoid factor (H)     Health Care Home     Sick sinus syndrome (H)     Cardiac pacemaker - Medtronic dual lead pacemaker - Not Dependent - MRI Safe       Past Medical History:   Diagnosis Date     Adjustment disorder with anxious mood 5/18/2015     Advanced directives, counseling/discussion 8/30/2012    Patient states has Advance Directive and will bring in a copy to clinic. 8/30/2012   Tevin May  [facility] Medical Assistant \       Anemia 9/25/2015     Basal cell cancer      CHF (congestive heart failure) (H) 9/18/2014     CKD (chronic kidney disease) stage 3, GFR 30-59 ml/min 9/29/2015      DDD (degenerative disc disease), lumbar 9/25/2015     Diffuse idiopathic pulmonary fibrosis (H) 5/6/2013     Encounter for palliative care 5/18/2015     History of blood transfusion 9/29/2015     Hypertension goal BP (blood pressure) < 140/90 9/7/2012     Hypothyroid 9/7/2012     Irritable bowel syndrome 10/29/2013     Macular degeneration      Macular degeneration, 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     Nondisplaced spiral fracture of shaft of humerus      Osteoporosis 8/13/2013     Imo Update utility     RA (rheumatoid arthritis) (H) 5/7/2013     Rheumatic fever      Shingles      Spinal stenosis of lumbar region with neurogenic claudication 9/14/2015       Past Surgical History:   Procedure Laterality Date     APPENDECTOMY       BIOPSY      hemorrhoidectomy     ENT SURGERY      tonsillectomy     GYN SURGERY      3 D & C's     HYSTERECTOMY, PAP NO LONGER INDICATED       LAMINECTOMY LUMBAR ONE LEVEL N/A 10/13/2015    Procedure: LAMINECTOMY LUMBAR ONE LEVEL;  Surgeon: Fransico Toussaint MD;  Location:  OR       Family History   Problem Relation Age of Onset     Hypertension Mother      Psychotic Disorder Father      DIABETES Son      DIABETES Daughter      Blood Disease Daughter        Social History   Substance Use Topics     Smoking status: Never Smoker     Smokeless tobacco: Never Used     Alcohol use Yes      Comment: rare wine          Exam:    Pulse 65  Wt 71.7 kg (158 lb)  SpO2 98%  BMI 27.99 kg/m2.  Patient a good historian.  A&O X 3.  Sen with daughter.  Pulses DP 1/4, PT 0/4 b/l.  CRT < 3 seconds X 10 digits.  Bilateral edema or varicosities noted.  5.07 monofilament  intact b/l.  Reflexes 2/4 b/l.  Skin thin, shiny, and atrophic with no hair growth noted b/l.  Normal arch with weightbearing.  No forefoot or rear foot deformities noted.  MS 5/5 all compartments.  Normal ROM all fore foot and rearfoot joints.  Right  fifth toe is flail.  Patient has callus on right fifth toe lateral.  Underlying tissue healthy.   Pain dorsum of 5/4/3 MTPJ.  No plantar pain.  No pain with vigorous loading of any met heads.  No masses or breakdown in the skin bilaterally.  No erythema or ecchymosis noted bilateral.     XR TOE RIGHT G/E 2 VIEWS 11/1/2017 8:38 AM     HISTORY: Right toe pain.     COMPARISON: 5/15/2013     FINDINGS: Stable changes to the distal fifth metatarsal. No acute  fracture or malalignment. The appearance of the lateral right forefoot  is not significantly changed from 5/15/2013.         IMPRESSION: No acute abnormality.    A/P  Right lateral forefoot pain from pressure  CKD stage 3  Calluses x 1    Personally reviewed x-rays.   Callus debrided with a fifteen blade.   She will keep this down with a pumice stone or use donut pad.  Explained  how her callus and foot pain from pressure with new orthotics taking up to much room in shoe.  She will not use these anymore.  Continue air cast until pain resolved.  Another option is cutting hole in old shoe over this area.    Return to clinic prn.  Thank you for allowing me participate in the care of this patient.          Darrell Donaldson DPM, FACFAS

## 2017-11-03 NOTE — NURSING NOTE
"Chief Complaint   Patient presents with     Toe Pain     Right 5th toe       Initial Pulse 65  Wt 71.7 kg (158 lb)  SpO2 98%  BMI 27.99 kg/m2 Estimated body mass index is 27.99 kg/(m^2) as calculated from the following:    Height as of 11/1/17: 1.6 m (5' 3\").    Weight as of this encounter: 71.7 kg (158 lb).  Medication Reconciliation: complete  "

## 2017-11-06 DIAGNOSIS — I48.92 ATRIAL FLUTTER, PAROXYSMAL (H): Primary | ICD-10-CM

## 2017-11-06 NOTE — TELEPHONE ENCOUNTER
Message from MyChart:  Original authorizing provider: MD Linda Michael would like a refill of the following medications:  ranitidine (ZANTAC) 150 MG tablet [Tre Lockett MD]    Preferred pharmacy: [facility] [place] - [address]    Comment:

## 2017-11-07 ENCOUNTER — OFFICE VISIT (OUTPATIENT)
Dept: RHEUMATOLOGY | Facility: CLINIC | Age: 82
End: 2017-11-07
Payer: MEDICARE

## 2017-11-07 VITALS — WEIGHT: 159 LBS | BODY MASS INDEX: 28.17 KG/M2 | OXYGEN SATURATION: 98 % | HEART RATE: 66 BPM | HEIGHT: 63 IN

## 2017-11-07 DIAGNOSIS — M05.79 RHEUMATOID ARTHRITIS INVOLVING MULTIPLE SITES WITH POSITIVE RHEUMATOID FACTOR (H): Primary | ICD-10-CM

## 2017-11-07 DIAGNOSIS — Z79.899 HIGH RISK MEDICATION USE: ICD-10-CM

## 2017-11-07 PROCEDURE — 99213 OFFICE O/P EST LOW 20 MIN: CPT | Performed by: INTERNAL MEDICINE

## 2017-11-07 RX ORDER — AZATHIOPRINE 50 MG/1
50 TABLET ORAL DAILY
Qty: 90 TABLET | Refills: 2 | Status: SHIPPED | OUTPATIENT
Start: 2017-11-07 | End: 2018-05-08

## 2017-11-07 NOTE — PROGRESS NOTES
"Rheumatology Clinic Visit      Linda Spicer MRN# [id number]   YOB: 1931 Age: 86 year old      Date of visit: 11/07/17   PCP: Dr. Tre Lockett  Pulmonology: Dr. Sandra Comer  Cardiology: Dr. Emeterio Loza  Dermatology: Dr. Andrews Knott at [facility] in [place]     Chief Complaint   Patient presents with:  Arthritis: RA, patient states she is doing good, just once in awhile little twinges in her knees      Assessment and Plan     1. Seropositive Nonerosive Rheumatoid Arthritis (RF 99, CCP 52): Previously treated with hydroxychloroquine 200 mg daily (stopped previously because of macular degeneration), methotrexate (leg cramps), Cimzia (stopped due to recurrent basal cell carcinoma and hx of heart failure). Has sulfa allergy. Leflunomide avoided because of ILD.  Currently on Orencia IV and azathioprine 50mg daily. Doing well today and therefore will maintain the current medication regimen.  TPMT 23.2 (normal 24-44) and considered \"intermediate activity\".  - Continue orencia 750mg IV, administered  at the [facility]  - Continue azathioprine 50mg daily  - Labs every 3 months to be done with her Orencia infusions (every third infusion): CBC, Creatinine, Hepatic Panel, ESR, CRP    2. Bilateral shoulder impingement syndrome, history: Maintaining ROM with exercises at home. She was previously referred to PT but did not go. Not an issue today.     3. Macular degeneration: listed here because of clinical significance; not managed in this clinic    4. History of basal cell carcinoma: Reportedly with lesions removed in 2007, fall 2016 and fall 2017.    5. Heart failure: She is followed by cardiology.  Has a pacemaker, per patient.     6. 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  2013 chest CT with contrast performed at [facility] documents UIP pattern. She was then " seen by Dr. Comer on 3/14/2017.  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     7. Bone Health: Managed by PCP already.     8. Elevated creatinine / CKD: Possibly secondary to dehydration given the BUN: Creatinine ratio of 30. She is going have additional labs drawn on Friday for toxicity monitoring and the creatinine may be repeated at that time. I advised her to be well hydrated. The creatinine was only slightly elevated from her baseline. She follows with her PCP for this issue.     Ms. Spicer verbalized agreement with and understanding of the rational for the diagnosis and treatment plan.  All questions were answered to best of my ability and the patient's satisfaction. Ms. Spicer was advised to contact the clinic with any questions that may arise after the clinic visit.      Thank you for involving me in the care of the patient    Return to clinic: 3 months      HPI   Linda Spicer is a 86 year old female with a medical history significant for hypertension, heart failure, pulmonary fibrosis, audible bowel syndrome, degenerative disc disease of the lumbar spine status post laminectomy, chronic kidney disease, history of basal cell carcinoma, and rheumatoid arthritis who presents for follow-up of rheumatoid arthritis.    Today, she reports that her joints are doing great. She occasionally has a twinge in her knees but it never lasts for more than a couple minutes. No morning stiffness. Overall feels well. Recently found to have an elevated creatinine by Dr. Lockett.    Denies fevers, chills, nausea, vomiting. No abdominal pain. No chest pain/pressure, palpitations.  Chronic SOB. No oral or nasal sores. No neck pain.  No LE swelling.  No photosensitivity or photophobia.  No sicca symptoms.  No eye pain or redness.     Tobacco: None  EtOH: rare  Drugs: none  Used to live in [place], [place] part time.    ROS   GEN: No fevers, chills, night sweats, or weight change  SKIN: See HPI  HEENT: No epistaxis. No oral or nasal ulcers.  CV: No chest  pain, pressure, palpitations  PULM: No wheeze, cough.  GI: No nausea, vomiting. No blood in stool. No abdominal pain.  : No blood in urine.  MSK: See HPI.  NEURO: No numbness, tingling, or weakness.  EXT: No LE swelling  PSYCH: Negative    Active Problem List     Patient Active Problem List   Diagnosis     ACP (advance care planning)     Hypertension goal BP (blood pressure) < 150/90     Hypothyroid     Diffuse idiopathic pulmonary fibrosis (H)     Macular degeneration, left eye     Irritable bowel syndrome     Encounter for palliative care     Adjustment disorder with anxious mood     Mild anemia     DDD (degenerative disc disease), lumbar     CKD (chronic kidney disease) stage 3, GFR 30-59 ml/min     History of blood transfusion     Aftercare following surgery     S/P lumbar laminectomy     High risk medication use     Osteopenia     Atrophic vaginitis     Fecal incontinence     Female stress incontinence     Impingement syndrome of both shoulders     UIP (usual interstitial pneumonitis) (H)     High risk medications (not anticoagulants) long-term use     Heart failure with preserved ejection fraction (H)     Congestive heart failure with preserved LV function, NYHA class 3 (H)     Other specified hypothyroidism     Rheumatoid arthritis involving multiple sites with positive rheumatoid factor (H)     Health Care Home     Sick sinus syndrome (H)     Cardiac pacemaker - Medtronic dual lead pacemaker - Not Dependent - MRI Safe     Past Medical History     Past Medical History:   Diagnosis Date     Adjustment disorder with anxious mood 5/18/2015     Advanced directives, counseling/discussion 8/30/2012    Patient states has Advance Directive and will bring in a copy to clinic. 8/30/2012   Tevin May  [facility] Medical Assistant \       Anemia 9/25/2015     Basal cell cancer      CHF (congestive heart failure) (H) 9/18/2014     CKD (chronic kidney disease) stage 3, GFR 30-59 ml/min 9/29/2015     DDD (degenerative  disc disease), lumbar 9/25/2015     Diffuse idiopathic pulmonary fibrosis (H) 5/6/2013     Encounter for palliative care 5/18/2015     History of blood transfusion 9/29/2015     Hypertension goal BP (blood pressure) < 140/90 9/7/2012     Hypothyroid 9/7/2012     Irritable bowel syndrome 10/29/2013     Macular degeneration      Macular degeneration, 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     Nondisplaced spiral fracture of shaft of humerus      Osteoporosis 8/13/2013     Imo Update utility     RA (rheumatoid arthritis) (H) 5/7/2013     Rheumatic fever      Shingles      Spinal stenosis of lumbar region with neurogenic claudication 9/14/2015     Past Surgical History     Past Surgical History:   Procedure Laterality Date     APPENDECTOMY       BIOPSY      hemorrhoidectomy     ENT SURGERY      tonsillectomy     GYN SURGERY      3 D & C's     HYSTERECTOMY, PAP NO LONGER INDICATED       LAMINECTOMY LUMBAR ONE LEVEL N/A 10/13/2015    Procedure: LAMINECTOMY LUMBAR ONE LEVEL;  Surgeon: Fransico Toussaint MD;  Location: [facility] OR     Allergy     Allergies   Allergen Reactions     Cephalexin Hcl Diarrhea     Gabapentin Other (See Comments)     Dizzsiness     Naproxen GI Disturbance     Perfume      Lactase Other (See Comments)     Macrobid [Nitrofurantoin Anhydrous]      Possibly related to lung disease      Sulfa Drugs      Throat swelling     Xanax [Alprazolam] Other (See Comments)     Dizziness      Current Medication List     Current Outpatient Prescriptions   Medication Sig     apixaban ANTICOAGULANT (ELIQUIS) 2.5 MG tablet Take 1 tablet (2.5 mg) by mouth 2 times daily     order for DME Dispense TIESHA Curran     ranibizumab (LUCENTIS) 0.3 MG/0.05ML SOLN 0.3 mg by Intravitreal route     metoprolol (TOPROL-XL) 25 MG 24 hr tablet Take 1 tablet (25 mg) by mouth every morning (Patient taking differently: Take 12.5 mg by mouth 2 times daily )     LOSARTAN POTASSIUM PO Take 25 mg by mouth daily (with dinner) Hold if SBP < 110.      azaTHIOprine  (IMURAN) 50 MG tablet Take 1 tablet (50 mg) by mouth daily     ranitidine (ZANTAC) 150 MG tablet Take 1 tablet (150 mg) by mouth 2 times daily     cholestyramine (QUESTRAN) 4 G Packet Take 1 packet (4 g) by mouth 2 times daily (with meals)     IBANdronate (BONIVA) 150 MG tablet Take 1 tablet (150 mg) by mouth every 30 days Take 60 minutes before am meal with 8 oz. water. Remain upright for 30 minutes.     levothyroxine (SYNTHROID/LEVOTHROID) 75 MCG tablet TAKE ONE TABLET BY MOUTH EVERY DAY     folic acid (FOLVITE) 1 MG tablet Take 1 tablet (1 mg) by mouth daily     bismuth subsalicylate 262 MG TABS Take 1 tablet by mouth every 4 hours as needed     MetroNIDAZOLE (FLAGYL PO) Take 500 mg by mouth 2 times daily      ciprofloxacin (CIPRO) 250 MG tablet Take 1 tablet (250 mg) by mouth daily     SPIRONOLACTONE PO Take 12.5 mg by mouth every morning Hold if SBP is less than 120.     senna-docusate (SENOKOT-S;PERICOLACE) 8.6-50 MG per tablet Take 2 tablets by mouth At Bedtime Hold if diarrhea occurs.     Cranberry 180 MG CAPS Take 1 capsule by mouth daily Unsure of strength     saccharomyces boulardii (FLORASTOR) 250 MG capsule Take 250 mg by mouth     order for DME Equipment being ordered: Dispense baffle, for use with nebulizer.     order for DME Equipment being ordered: Nebulizer. Use with Albuterol.     order for DME Equipment being ordered: Dispense face mask.  Mrs. Spicer is immunosuppressed due to rheumatoid arthritis.     triamcinolone (KENALOG) 0.1 % cream Apply sparingly to affected area on face three times daily.     Multiple Vitamins-Minerals (PRESERVISION AREDS PO) Take 2 tablets by mouth daily     Blood Pressure Monitor KIT 1 kit daily as needed     nitroglycerin (NITROSTAT) 0.4 MG SL tablet Place 1 tablet (0.4 mg) under the tongue every 5 minutes as needed for chest pain     Ranibizumab (LUCENTIS IO) 1 injection every 4 Weeks     cetirizine (ZYRTEC ALLERGY) 10 MG tablet Take 10 mg by mouth At Bedtime      "Hypromellose (GENTEAL MILD OP) Apply  to eye daily.     Artificial Tear Ointment (REFRESH P.M.) OINT Apply  to eye. Daily at bedtime        calcium-vitamin D (CALTRATE) 600-400 MG-UNIT per tablet Take 1 tablet by mouth 2 times daily      fish oil-omega-3 fatty acids (FISH OIL) 1000 MG capsule Take 2 g by mouth daily. 2 capsules daily          No current facility-administered medications for this visit.      Facility-Administered Medications Ordered in Other Visits   Medication     DOBUTamine 500 mg in dextrose 5% 250 mL (adult std)     DOBUTamine 500 mg in dextrose 5% 250 mL (adult std)       Social History   See HPI    Family History     Family History   Problem Relation Age of Onset     Hypertension Mother      Psychotic Disorder Father      DIABETES Son      DIABETES Daughter      Blood Disease Daughter      Physical Exam     Temp Readings from Last 3 Encounters:   11/01/17 98.1  F (36.7  C) (Oral)   10/13/17 96.9  F (36.1  C) (Oral)   09/16/17 98  F (36.7  C) (Oral)     BP Readings from Last 5 Encounters:   11/01/17 136/59   10/13/17 131/64   09/16/17 128/69   09/15/17 146/70   09/11/17 138/67     Pulse Readings from Last 1 Encounters:   11/07/17 66     Resp Readings from Last 1 Encounters:   10/13/17 20     Estimated body mass index is 28.17 kg/(m^2) as calculated from the following:    Height as of this encounter: 1.6 m (5' 3\").    Weight as of this encounter: 72.1 kg (159 lb).    GEN: NAD  HEENT: MMM. No oral lesions. Anicteric, noninjected sclera  CV: S1, S2. RRR. No m/r/g.  PULM: CTA bilaterally. No w/c.   MSK: Hands, wrists, elbows, and shoulders without swelling or tenderness to palpation. Hips nontender to direct palpation. Bilateral knees with mild medial joint line tenderness but no effusion or increased warmth.  Ankles and feet without swelling or tenderness to palpation.  Negative MCP and MTP squeeze bilaterally.   NEURO: UE and LE strengths 5/5 and equal bilaterally.   SKIN: No rash  EXT: No LE " edema  PSYCH: Alert. Appropriate.    Labs / Imaging (select studies)   RF/CCP  Recent Labs   Lab Test  04/05/16   1036   CCPIGG  52*   RHF  99*     CBC  Recent Labs   Lab Test  08/18/17   0934  07/21/17   0859  06/27/17   0852   WBC  5.8  5.7  7.2   RBC  3.71*  3.69*  3.59*   HGB  12.1  11.9  11.5*   HCT  35.6  35.3  35.1   MCV  96  96  98   RDW  14.1  13.7  13.6   PLT  253  246  257   MCH  32.6  32.2  32.0   MCHC  34.0  33.7  32.8   NEUTROPHIL  65.0  62.9  62.5   LYMPH  24.5  23.6  25.0   MONOCYTE  7.8  11.3  10.2   EOSINOPHIL  2.4  1.8  1.9   BASOPHIL  0.3  0.4  0.4   ANEU  3.8  3.6  4.5   ALYM  1.4  1.3  1.8   YEHUDA  0.5  0.6  0.7   AEOS  0.1  0.1  0.1   ABAS  0.0  0.0  0.0     CMP  Recent Labs   Lab Test  11/01/17   0831  08/18/17   0934  07/26/17   1144  07/21/17   0859   03/24/17   1401  02/16/17   0949   NA  139   --    --    --    --   138  138   POTASSIUM  4.3   --    --    --    --   3.7  4.7   CHLORIDE  105   --    --    --    --   104  103   CO2  31   --    --    --    --   23  27   ANIONGAP  3   --    --    --    --   11  8   GLC  97   --    --    --    --   96  89   BUN  42*   --    --    --    --   34*  30   CR  1.41*  1.18*  1.32*  1.24*   < >  1.31*  1.24*   GFRESTIMATED  35*  43*  38*  41*   < >  39*  41*   GFRESTBLACK  43*  53*  46*  50*   < >  47*  50*   FATOUMATA  9.8   --    --    --    --   8.7  8.5   BILITOTAL  0.5  0.5   --   0.4   < >  0.5  0.3   ALBUMIN  3.6  3.7   --   3.5   < >  3.5  3.6   PROTTOTAL  7.4  7.5   --   7.3   < >  7.3  7.4   ALKPHOS  65  55   --   63   < >  62  62   AST  25  20   --   28   < >  34  21   ALT  33  31   --   31   < >  36  21    < > = values in this interval not displayed.     Calcium/VitaminD  Recent Labs   Lab Test  11/01/17   0831  03/24/17   1401  02/16/17   0949   FATOUMATA  9.8  8.7  8.5     ESR/CRP  Recent Labs   Lab Test  08/18/17   0934  05/11/17   0931  02/16/17   0949   04/05/16   1036   SED  18  27   --    --   28   CRP  <2.9  <2.9  4.5   < >   --     < > =  values in this interval not displayed.     Hepatitis B  Recent Labs   Lab Test  04/05/16   1036   HBCAB  Nonreactive   HEPBANG  Nonreactive     Hepatitis C  Recent Labs   Lab Test  04/05/16   1036   HCVAB  Nonreactive   Assay performance characteristics have not been established for newborns,   infants, and children       Tuberculosis Screening  Recent Labs   Lab Test  02/21/17   1148  04/05/16   1037   TBRSLT  Negative  Negative   TBAGN  0.19  0.03     HIV Screening  Recent Labs   Lab Test  04/05/16   1036   HIAGAB  Nonreactive   HIV-1 p24 Ag & HIV-1/HIV-2 Ab Not Detected       Immunization History     Immunization History   Administered Date(s) Administered     Influenza (High Dose) 3 valent vaccine 09/07/2012, 10/08/2013, 09/26/2014, 10/09/2015, 09/21/2016, 10/13/2017     Influenza (IIV3) 08/29/2011     Pneumococcal (PCV 13) 04/05/2016     Pneumococcal 23 valent 05/01/2001, 10/04/2010     TD (ADULT, 7+) 07/01/2008          Chart documentation done in part with Dragon Voice recognition Software. Although reviewed after completion, some word and grammatical error may remain.    Mario Davenport MD

## 2017-11-07 NOTE — Clinical Note
Arthritis is doing well.  Creatinine was elevated on recent labs but she has CKD. possibly more elevated due to dehydration? She will be getting toxicity monitoring labs on Friday with her Orencia infusion and will be repeated then.   Mario

## 2017-11-07 NOTE — MR AVS SNAPSHOT
After Visit Summary   11/7/2017    Linda Spicer    MRN: [id number]           Patient Information     Date Of Birth          6/13/1931        Visit Information        Provider Department      11/7/2017 9:20 AM Mario Davenport MD [facility]        Today's Diagnoses     Rheumatoid arthritis involving multiple sites with positive rheumatoid factor (H)    -  1    High risk medication use           Follow-ups after your visit        Your next 10 appointments already scheduled     Nov 10, 2017  9:30 AM CST   Level 2 with BAY 8 INFUSION   [facility] ([facility])    [address] [place] [id number]   [phone]            Feb 06, 2018  9:20 AM CST   Return Visit with Mario Davenport MD   [facility] ([facility])    [address] [place] [address]   [phone]            Mar 12, 2018  2:10 PM CDT   Return Visit with Emeterio Loza MD   [facility] ([facility])    [address] [place] [id number] [phone]            Apr 04, 2018  4:00 PM CDT   Return Visit with DEVICE CHECK RN CARD   [facility])    [address] [place] [id number] [phone]              Who to contact     If you have questions or need follow up information about today's clinic visit or your schedule please contact [facility] directly at [phone].  Normal or non-critical lab and imaging results will be communicated to you by MyChart, letter or phone within 4 business days after the clinic has received the results. If you do not hear from us within 7 days, please contact the clinic through MyChart or phone. If you have a critical or abnormal lab result, we will notify you by phone as soon as possible.  Submit refill requests through [employer] or call your pharmacy and  "they will forward the refill request to us. Please allow 3 business days for your refill to be completed.          Additional Information About Your Visit        [employer] Information     [employer] gives you secure access to your electronic health record. If you see a primary care provider, you can also send messages to your care team and make appointments. If you have questions, please call your primary care clinic.  If you do not have a primary care provider, please call [phone] and they will assist you.        Care EveryWhere ID     This is your Care EveryWhere ID. This could be used by other organizations to access your [facility] medical records  [id number]        Your Vitals Were     Pulse Height Pulse Oximetry BMI (Body Mass Index)          66 1.6 m (5' 3\") 98% 28.17 kg/m2         Blood Pressure from Last 3 Encounters:   11/01/17 136/59   10/13/17 131/64   09/16/17 128/69    Weight from Last 3 Encounters:   11/07/17 72.1 kg (159 lb)   11/03/17 71.7 kg (158 lb)   11/01/17 71.7 kg (158 lb)              Today, you had the following     No orders found for display         Today's Medication Changes          These changes are accurate as of: 11/7/17  9:54 AM.  If you have any questions, ask your nurse or doctor.               These medicines have changed or have updated prescriptions.        Dose/Directions    metoprolol 25 MG 24 hr tablet   Commonly known as:  TOPROL-XL   This may have changed:    - how much to take  - when to take this   Used for:  Chronic atrial fibrillation (H)        Dose:  25 mg   Take 1 tablet (25 mg) by mouth every morning   Quantity:  90 tablet   Refills:  3            Where to get your medicines      [place] [facility] [place] [address]  [address], [facility] [address]     Phone:  [phone]     azaTHIOprine 50 MG tablet                Primary Care Provider Office Phone # Fax #    Tre Lockett MD " [phone]       [address]  [facility] [address]        Equal Access to Services     [facility] SARKAR : Hadii aad ku hadyakelin Sojordin, waaxda luqadaha, qaybta kaalmada adelashon, zahraa craincapri skinner. So [facility] [phone].    ATENCIÓN: Si habla español, tiene a merchant disposición servicios gratuitos de asistencia lingüística. Llame al [phone].    We comply with applicable federal civil rights laws and [place] laws. We do not discriminate on the basis of race, color, national origin, age, disability, sex, sexual orientation, or gender identity.            Thank you!     Thank you for choosing [facility]  for your care. Our goal is always to provide you with excellent care. Hearing back from our patients is one way we can continue to improve our services. Please take a few minutes to complete the written survey that you may receive in the mail after your visit with us. Thank you!             Your Updated Medication List - Protect others around you: Learn how to safely use, store and throw away your medicines at www.disposemymeds.org.          This list is accurate as of: 11/7/17  9:54 AM.  Always use your most recent med list.                   Brand Name Dispense Instructions for use Diagnosis    apixaban ANTICOAGULANT 2.5 MG tablet    ELIQUIS    60 tablet    Take 1 tablet (2.5 mg) by mouth 2 times daily    Atrial flutter, paroxysmal (H)       azaTHIOprine 50 MG tablet    IMURAN    90 tablet    Take 1 tablet (50 mg) by mouth daily    Rheumatoid arthritis involving multiple sites with positive rheumatoid factor (H)       bismuth subsalicylate 262 MG Tabs     80 tablet    Take 1 tablet by mouth every 4 hours as needed    Acute infectious diarrhea, Enteritis due to Norovirus       Blood Pressure Monitor Kit     1 kit    1 kit daily as needed    CHF (congestive heart failure) (H)       calcium-vitamin D 600-400 MG-UNIT per tablet    CALTRATE     Take 1  tablet by mouth 2 times daily        cholestyramine 4 G Packet    QUESTRAN    60 each    Take 1 packet (4 g) by mouth 2 times daily (with meals)    Acute diarrhea       ciprofloxacin 250 MG tablet    CIPRO    90 tablet    Take 1 tablet (250 mg) by mouth daily    Chronic UTI       Cranberry 180 MG Caps      Take 1 capsule by mouth daily Unsure of strength        fish oil-omega-3 fatty acids 1000 MG capsule      Take 2 g by mouth daily. 2 capsules daily        FLAGYL PO      Take 500 mg by mouth 2 times daily    Acute infectious diarrhea       folic acid 1 MG tablet    FOLVITE    100 tablet    Take 1 tablet (1 mg) by mouth daily    Oral aphthae       GENTEAL MILD OP      Apply  to eye daily.        IBANdronate 150 MG tablet    BONIVA    1 tablet    Take 1 tablet (150 mg) by mouth every 30 days Take 60 minutes before am meal with 8 oz. water. Remain upright for 30 minutes.    Osteopenia       levothyroxine 75 MCG tablet    SYNTHROID/LEVOTHROID    90 tablet    TAKE ONE TABLET BY MOUTH EVERY DAY    Hypothyroidism, unspecified type       LOSARTAN POTASSIUM PO      Take 25 mg by mouth daily (with dinner) Hold if SBP < 110.        * LUCENTIS IO      1 injection every 4 Weeks        * ranibizumab 0.3 MG/0.05ML Soln    LUCENTIS     0.3 mg by Intravitreal route        metoprolol 25 MG 24 hr tablet    TOPROL-XL    90 tablet    Take 1 tablet (25 mg) by mouth every morning    Chronic atrial fibrillation (H)       nitroGLYcerin 0.4 MG sublingual tablet    NITROSTAT    25 tablet    Place 1 tablet (0.4 mg) under the tongue every 5 minutes as needed for chest pain    Chest pain       * order for DME     1 Box    Equipment being ordered: Dispense face mask. Mrs. Spicer is immunosuppressed due to rheumatoid arthritis.    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 Use with Albuterol.    Hypoxia       order for DME     1 each     Equipment being ordered: Dispense baffle, for use with nebulizer.    Pneumonia of left upper lobe due to Mycoplasma pneumoniae       * order for DME     1 each    Dispense SP Walker-small    Pain of toe of right foot, Status post bunionectomy       PRESERVISION AREDS PO      Take 2 tablets by mouth daily        ranitidine 150 MG tablet    ZANTAC    60 tablet    Take 1 tablet (150 mg) by mouth 2 times daily    Gastroesophageal reflux disease without esophagitis       REFRESH P.M. Oint      Apply  to eye. Daily at bedtime        saccharomyces boulardii 250 MG capsule    FLORASTOR     Take 250 mg by mouth        senna-docusate 8.6-50 MG per tablet    SENOKOT-S;PERICOLACE    120 tablet    Take 2 tablets by mouth At Bedtime Hold if diarrhea occurs.    Constipation, chronic       SPIRONOLACTONE PO      Take 12.5 mg by mouth every morning Hold if SBP is less than 120.    Congestive heart failure with preserved LV function, NYHA class 3 (H)       triamcinolone 0.1 % cream    KENALOG    453.6 g    Apply sparingly to affected area on face three times daily.    Facial lesion       ZYRTEC ALLERGY 10 MG tablet   Generic drug:  cetirizine      Take 10 mg by mouth At Bedtime        * Notice:  This list has 5 medication(s) that are the same as other medications prescribed for you. Read the directions carefully, and ask your doctor or other care provider to review them with you.

## 2017-11-07 NOTE — NURSING NOTE
"Chief Complaint   Patient presents with     Arthritis     RA, patient states she is doing good, just once in awhile little twinges in her knees       Initial Pulse 66  Ht 1.6 m (5' 3\")  Wt 72.1 kg (159 lb)  SpO2 98%  BMI 28.17 kg/m2 Estimated body mass index is 28.17 kg/(m^2) as calculated from the following:    Height as of this encounter: 1.6 m (5' 3\").    Weight as of this encounter: 72.1 kg (159 lb).  BP completed using cuff size: small regular         RAPID3 (0-30) Cumulative Score            RAPID3 Weighted Score (divide #4 by 3 and that is the weighted score)           "

## 2017-11-10 ENCOUNTER — INFUSION THERAPY VISIT (OUTPATIENT)
Dept: INFUSION THERAPY | Facility: CLINIC | Age: 82
End: 2017-11-10
Payer: MEDICARE

## 2017-11-10 VITALS
HEART RATE: 63 BPM | BODY MASS INDEX: 27.39 KG/M2 | OXYGEN SATURATION: 100 % | TEMPERATURE: 97.7 F | DIASTOLIC BLOOD PRESSURE: 74 MMHG | SYSTOLIC BLOOD PRESSURE: 114 MMHG | WEIGHT: 154.6 LBS | RESPIRATION RATE: 16 BRPM

## 2017-11-10 DIAGNOSIS — M05.79 RHEUMATOID ARTHRITIS INVOLVING MULTIPLE SITES WITH POSITIVE RHEUMATOID FACTOR (H): ICD-10-CM

## 2017-11-10 DIAGNOSIS — M05.79 RHEUMATOID ARTHRITIS INVOLVING MULTIPLE SITES WITH POSITIVE RHEUMATOID FACTOR (H): Primary | ICD-10-CM

## 2017-11-10 DIAGNOSIS — Z79.899 HIGH RISK MEDICATION USE: ICD-10-CM

## 2017-11-10 LAB
ALBUMIN SERPL-MCNC: 3.5 G/DL (ref 3.4–5)
ALP SERPL-CCNC: 59 U/L (ref 40–150)
ALT SERPL W P-5'-P-CCNC: 35 U/L (ref 0–50)
AST SERPL W P-5'-P-CCNC: 28 U/L (ref 0–45)
BASOPHILS # BLD AUTO: 0 10E9/L (ref 0–0.2)
BASOPHILS NFR BLD AUTO: 0.2 %
BILIRUB DIRECT SERPL-MCNC: <0.1 MG/DL (ref 0–0.2)
BILIRUB SERPL-MCNC: 0.5 MG/DL (ref 0.2–1.3)
CREAT SERPL-MCNC: 1.23 MG/DL (ref 0.52–1.04)
CRP SERPL-MCNC: <2.9 MG/L (ref 0–8)
DIFFERENTIAL METHOD BLD: ABNORMAL
EOSINOPHIL # BLD AUTO: 0.1 10E9/L (ref 0–0.7)
EOSINOPHIL NFR BLD AUTO: 1.8 %
ERYTHROCYTE [DISTWIDTH] IN BLOOD BY AUTOMATED COUNT: 12.1 % (ref 10–15)
ERYTHROCYTE [SEDIMENTATION RATE] IN BLOOD BY WESTERGREN METHOD: 23 MM/H (ref 0–30)
GFR SERPL CREATININE-BSD FRML MDRD: 41 ML/MIN/1.7M2
HCT VFR BLD AUTO: 36.8 % (ref 35–47)
HGB BLD-MCNC: 12 G/DL (ref 11.7–15.7)
LYMPHOCYTES # BLD AUTO: 1.5 10E9/L (ref 0.8–5.3)
LYMPHOCYTES NFR BLD AUTO: 24.7 %
MCH RBC QN AUTO: 32.6 PG (ref 26.5–33)
MCHC RBC AUTO-ENTMCNC: 32.6 G/DL (ref 31.5–36.5)
MCV RBC AUTO: 100 FL (ref 78–100)
MONOCYTES # BLD AUTO: 0.6 10E9/L (ref 0–1.3)
MONOCYTES NFR BLD AUTO: 10.1 %
NEUTROPHILS # BLD AUTO: 3.9 10E9/L (ref 1.6–8.3)
NEUTROPHILS NFR BLD AUTO: 63.2 %
PLATELET # BLD AUTO: 231 10E9/L (ref 150–450)
PROT SERPL-MCNC: 7.2 G/DL (ref 6.8–8.8)
RBC # BLD AUTO: 3.68 10E12/L (ref 3.8–5.2)
WBC # BLD AUTO: 6.2 10E9/L (ref 4–11)

## 2017-11-10 PROCEDURE — 80076 HEPATIC FUNCTION PANEL: CPT | Performed by: INTERNAL MEDICINE

## 2017-11-10 PROCEDURE — 99207 ZZC NO CHARGE LOS: CPT

## 2017-11-10 PROCEDURE — 85652 RBC SED RATE AUTOMATED: CPT | Performed by: INTERNAL MEDICINE

## 2017-11-10 PROCEDURE — 96365 THER/PROPH/DIAG IV INF INIT: CPT | Performed by: INTERNAL MEDICINE

## 2017-11-10 PROCEDURE — 85025 COMPLETE CBC W/AUTO DIFF WBC: CPT | Performed by: INTERNAL MEDICINE

## 2017-11-10 PROCEDURE — 86140 C-REACTIVE PROTEIN: CPT | Performed by: INTERNAL MEDICINE

## 2017-11-10 PROCEDURE — 36415 COLL VENOUS BLD VENIPUNCTURE: CPT | Performed by: INTERNAL MEDICINE

## 2017-11-10 PROCEDURE — 82565 ASSAY OF CREATININE: CPT | Performed by: INTERNAL MEDICINE

## 2017-11-10 RX ADMIN — Medication 500 ML: at 10:27

## 2017-11-10 NOTE — PROGRESS NOTES
"Infusion Nursing Note:  Linda KATEY Orellana presents today for Orencia.    Patient seen by provider today: No   present during visit today: Not Applicable.    Note: N/A.    Intravenous Access:  Peripheral IV placed.    Treatment Conditions:  Rheumatology Infusion Checklist: PRIOR TO INFUSION OF BIOLOGICAL MEDICATIONS OR ANY OF THESE AS LISTED: Orencia (abatacept) \".rheumbiologicalchecklist\"    Prior to Infusion of biological medications or any of these as listed:    1. Elevated temperature, fever, chills, productive cough or abnormal vital signs, night sweats, coughing up blood or sputum, no appetite or abnormal vital signs : NO    2. Open wounds or new incisions: NO    3. Recent hospitalization: NO    4.  Recent surgeries:  NO    5. Any upcoming surgeries or dental procedures?:NO    6. Any current or recent bouts of illness or infection? On any antibiotics? : NO      7. Any new, sudden or worsening abdominal pain :NO    8. Vaccination within 4 weeks? Patient or someone in the household is scheduled to receive vaccination? No live virus vaccines prior to or during treatment :NO    9. Any nervous system diseases [i.e. multiple sclerosis, Guillain-Tatum, seizures, neurological  changes]: NO    10. Pregnant or breast feeding; or plans on pregnancy in the future: NO    11. Signs of worsening depression or suicidal ideations while taking benlysta:N/A    12. New-onset medical symptoms: NO    13.  New cancer diagnosis or on chemotherapy or radiation NO    14.  Evaluate for any sign of active TB [Unexplained weight loss, Loss of appetite, Night sweats, Fever, Fatigue, Chills, Coughing for 3 weeks or longer, Hemoptysis (coughing up blood), Chest pain]: NO    **Note: If answered yes to any of the above, hold the infusion and contact ordering rheumatologist or on-call rheumatologist.   .        Post Infusion Assessment:  Patient tolerated infusion without incident.  No evidence of extravasations.  Access discontinued " per protocol.  Rheumatology Post Infusion: POST-INFUSION OF BIOLOGICAL MEDICATION:    Reviewed with patient.   Inform patient if any fever, chills or signs of infection, new symptoms, abdominal pain, heart palpitations, shortness of breath, reaction, weakness, neurological changes, seek medical attention immediately and should not receive infusions. No live virus vaccines prior to or during treatment or up to 6 months post infusion. If the patient has an upcoming procedure or surgery, this should be discussed with the rheumatologist and surgeon or provider..      Discharge Plan:   Discharge instructions reviewed with: Patient.  Patient and/or family verbalized understanding of discharge instructions and all questions answered.  Patient discharged in stable condition accompanied by: daughter.  Departure Mode: Ambulatory.    Lacey Granger RN

## 2017-11-10 NOTE — MR AVS SNAPSHOT
After Visit Summary   11/10/2017    Linda Spicer    MRN: [id number]           Patient Information     Date Of Birth          6/13/1931        Visit Information        Provider Department      11/10/2017 9:30 AM BAY 8 INFUSION [facility]        Today's Diagnoses     Rheumatoid arthritis involving multiple sites with positive rheumatoid factor (H)    -  1       Follow-ups after your visit        Your next 10 appointments already scheduled     Dec 08, 2017  9:00 AM CST   Level 2 with BAY 3 INFUSION   [facility] ([facility])    [address] [place] [address]   [phone]            Jan 05, 2018  9:30 AM CST   Level 2 with BAY 9 INFUSION   [facility] ([facility])    [address] [place] [id number] [phone]            Feb 06, 2018  9:20 AM CST   Return Visit with Mario Davenport MD   [facility])    [address] [place] [id number] [phone]            Mar 12, 2018  2:10 PM CDT   Return Visit with Emeterio Loza MD   [facility] ([facility])    [address] [place] [id number] [phone]            Apr 04, 2018  4:00 PM CDT   Return Visit with DEVICE CHECK RN CARD   [facility] ([facility])    [address] [place] [id number] [phone]              Who to contact     If you have questions or need follow up information about today's clinic visit or your schedule please contact [facility] directly at [phone].  Normal or non-critical lab and imaging results will be communicated to you by MyChart, letter or phone within 4 business days after the clinic has received the results. If you do not hear from us within 7 days, please contact the clinic through MyChart or phone. If you  have a critical or abnormal lab result, we will notify you by phone as soon as possible.  Submit refill requests through [employer] or call your pharmacy and they will forward the refill request to us. Please allow 3 business days for your refill to be completed.          Additional Information About Your Visit        [employer] Information     [employer] gives you secure access to your electronic health record. If you see a primary care provider, you can also send messages to your care team and make appointments. If you have questions, please call your primary care clinic.  If you do not have a primary care provider, please call [phone] and they will assist you.      [employer] is an electronic gateway that provides easy, online access to your medical records. With [employer], you can request a clinic appointment, read your test results, renew a prescription or communicate with your care team.     To access your existing account, please contact your [facility] or call [phone] for assistance.        Care EveryWhere ID     This is your Care EveryWhere ID. This could be used by other organizations to access your [facility] medical records  [id number]        Your Vitals Were     Pulse Temperature Respirations Pulse Oximetry BMI (Body Mass Index)       63 97.7  F (36.5  C) (Oral) 16 100% 27.39 kg/m2        Blood Pressure from Last 3 Encounters:   11/10/17 114/74   11/01/17 136/59   10/13/17 131/64    Weight from Last 3 Encounters:   11/10/17 70.1 kg (154 lb 9.6 oz)   11/07/17 72.1 kg (159 lb)   11/03/17 71.7 kg (158 lb)              Today, you had the following     No orders found for display         Today's Medication Changes          These changes are accurate as of: 11/10/17  3:40 PM.  If you have any questions, ask your nurse or doctor.               These medicines have changed or have updated prescriptions.        Dose/Directions    metoprolol 25 MG 24 hr tablet   Commonly known as:   TOPROL-XL   This may have changed:    - how much to take  - when to take this   Used for:  Chronic atrial fibrillation (H)        Dose:  25 mg   Take 1 tablet (25 mg) by mouth every morning   Quantity:  90 tablet   Refills:  3                Primary Care Provider Office Phone # Fax #    Tre Lockett [phone]       [address]  [facility] [address]        Equal Access to Services     [facility] SARKAR : Hadii aad ku hadasho Soomaali, waaxda luqadaha, qaybta kaalmada adeegyada, waxay idiin hayaan adeeg kharash la'emelina . So [facility] [phone].    ATENCIÓN: Si habla español, tiene a merchant disposición servicios gratuitos de asistencia lingüística. Llame al [phone].    We comply with applicable federal civil rights laws and [place] laws. We do not discriminate on the basis of race, color, national origin, age, disability, sex, sexual orientation, or gender identity.            Thank you!     Thank you for choosing [facility]  for your care. Our goal is always to provide you with excellent care. Hearing back from our patients is one way we can continue to improve our services. Please take a few minutes to complete the written survey that you may receive in the mail after your visit with us. Thank you!             Your Updated Medication List - Protect others around you: Learn how to safely use, store and throw away your medicines at www.disposemymeds.org.          This list is accurate as of: 11/10/17  3:40 PM.  Always use your most recent med list.                   Brand Name Dispense Instructions for use Diagnosis    apixaban ANTICOAGULANT 2.5 MG tablet    ELIQUIS    60 tablet    Take 1 tablet (2.5 mg) by mouth 2 times daily    Atrial flutter, paroxysmal (H)       azaTHIOprine 50 MG tablet    IMURAN    90 tablet    Take 1 tablet (50 mg) by mouth daily    Rheumatoid arthritis involving multiple sites with positive rheumatoid factor (H)       bismuth subsalicylate 262 MG  Tabs     80 tablet    Take 1 tablet by mouth every 4 hours as needed    Acute infectious diarrhea, Enteritis due to Norovirus       Blood Pressure Monitor Kit     1 kit    1 kit daily as needed    CHF (congestive heart failure) (H)       calcium-vitamin D 600-400 MG-UNIT per tablet    CALTRATE     Take 1 tablet by mouth 2 times daily        cholestyramine 4 G Packet    QUESTRAN    60 each    Take 1 packet (4 g) by mouth 2 times daily (with meals)    Acute diarrhea       ciprofloxacin 250 MG tablet    CIPRO    90 tablet    Take 1 tablet (250 mg) by mouth daily    Chronic UTI       Cranberry 180 MG Caps      Take 1 capsule by mouth daily Unsure of strength        fish oil-omega-3 fatty acids 1000 MG capsule      Take 2 g by mouth daily. 2 capsules daily        FLAGYL PO      Take 500 mg by mouth 2 times daily    Acute infectious diarrhea       folic acid 1 MG tablet    FOLVITE    100 tablet    Take 1 tablet (1 mg) by mouth daily    Oral aphthae       GENTEAL MILD OP      Apply  to eye daily.        IBANdronate 150 MG tablet    BONIVA    1 tablet    Take 1 tablet (150 mg) by mouth every 30 days Take 60 minutes before am meal with 8 oz. water. Remain upright for 30 minutes.    Osteopenia       levothyroxine 75 MCG tablet    SYNTHROID/LEVOTHROID    90 tablet    TAKE ONE TABLET BY MOUTH EVERY DAY    Hypothyroidism, unspecified type       LOSARTAN POTASSIUM PO      Take 25 mg by mouth daily (with dinner) Hold if SBP < 110.        * LUCENTIS IO      1 injection every 4 Weeks        * ranibizumab 0.3 MG/0.05ML Soln    LUCENTIS     0.3 mg by Intravitreal route        metoprolol 25 MG 24 hr tablet    TOPROL-XL    90 tablet    Take 1 tablet (25 mg) by mouth every morning    Chronic atrial fibrillation (H)       nitroGLYcerin 0.4 MG sublingual tablet    NITROSTAT    25 tablet    Place 1 tablet (0.4 mg) under the tongue every 5 minutes as needed for chest pain    Chest pain       * order for DME     1 Box    Equipment being  ordered: Dispense face mask. Mrs. Spicer is immunosuppressed due to rheumatoid arthritis.    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 Use with Albuterol.    Hypoxia       order for DME     1 each    Equipment being ordered: Dispense baffle, for use with nebulizer.    Pneumonia of left upper lobe due to Mycoplasma pneumoniae       * order for DME     1 each    Dispense SP Walker-small    Pain of toe of right foot, Status post bunionectomy       PRESERVISION AREDS PO      Take 2 tablets by mouth daily        ranitidine 150 MG tablet    ZANTAC    60 tablet    Take 1 tablet (150 mg) by mouth 2 times daily    Gastroesophageal reflux disease without esophagitis       REFRESH P.M. Oint      Apply  to eye. Daily at bedtime        saccharomyces boulardii 250 MG capsule    FLORASTOR     Take 250 mg by mouth        senna-docusate 8.6-50 MG per tablet    SENOKOT-S;PERICOLACE    120 tablet    Take 2 tablets by mouth At Bedtime Hold if diarrhea occurs.    Constipation, chronic       SPIRONOLACTONE PO      Take 12.5 mg by mouth every morning Hold if SBP is less than 120.    Congestive heart failure with preserved LV function, NYHA class 3 (H)       triamcinolone 0.1 % cream    KENALOG    453.6 g    Apply sparingly to affected area on face three times daily.    Facial lesion       ZYRTEC ALLERGY 10 MG tablet   Generic drug:  cetirizine      Take 10 mg by mouth At Bedtime        * Notice:  This list has 5 medication(s) that are the same as other medications prescribed for you. Read the directions carefully, and ask your doctor or other care provider to review them with you.

## 2017-11-16 ENCOUNTER — OFFICE VISIT (OUTPATIENT)
Dept: FAMILY MEDICINE | Facility: CLINIC | Age: 82
End: 2017-11-16
Payer: MEDICARE

## 2017-11-16 ENCOUNTER — RADIANT APPOINTMENT (OUTPATIENT)
Dept: GENERAL RADIOLOGY | Facility: CLINIC | Age: 82
End: 2017-11-16
Attending: INTERNAL MEDICINE
Payer: MEDICARE

## 2017-11-16 VITALS
TEMPERATURE: 97.7 F | HEART RATE: 67 BPM | BODY MASS INDEX: 28.35 KG/M2 | DIASTOLIC BLOOD PRESSURE: 62 MMHG | WEIGHT: 160 LBS | SYSTOLIC BLOOD PRESSURE: 116 MMHG | HEIGHT: 63 IN | OXYGEN SATURATION: 99 %

## 2017-11-16 DIAGNOSIS — M79.642 PAIN OF LEFT HAND: Primary | ICD-10-CM

## 2017-11-16 DIAGNOSIS — I10 HYPERTENSION GOAL BP (BLOOD PRESSURE) < 150/90: ICD-10-CM

## 2017-11-16 DIAGNOSIS — N30.00 ACUTE CYSTITIS WITHOUT HEMATURIA: ICD-10-CM

## 2017-11-16 DIAGNOSIS — M81.0 AGE-RELATED OSTEOPOROSIS WITHOUT CURRENT PATHOLOGICAL FRACTURE: ICD-10-CM

## 2017-11-16 PROCEDURE — 99214 OFFICE O/P EST MOD 30 MIN: CPT | Performed by: INTERNAL MEDICINE

## 2017-11-16 PROCEDURE — 73130 X-RAY EXAM OF HAND: CPT | Mod: LT

## 2017-11-16 RX ORDER — LOSARTAN POTASSIUM 25 MG/1
25 TABLET ORAL
Qty: 90 TABLET | Refills: 1 | Status: SHIPPED | OUTPATIENT
Start: 2017-11-16 | End: 2017-12-20

## 2017-11-16 RX ORDER — RALOXIFENE HYDROCHLORIDE 60 MG/1
60 TABLET, FILM COATED ORAL DAILY
Qty: 30 TABLET | Refills: 5 | Status: SHIPPED | OUTPATIENT
Start: 2017-11-16 | End: 2017-12-20 | Stop reason: SINTOL

## 2017-11-16 NOTE — PATIENT INSTRUCTIONS
At [facility], we strive to deliver an exceptional experience to you, every time we see you.  If you receive a survey in the mail, please send us back your thoughts. We really do value your feedback.    Based on your medical history, these are the current health maintenance/preventive care services that you are due for (some may have been done at this visit.)  There are no preventive care reminders to display for this patient.      Suggested websites for health information:  Www.[facility].org : Up to date and easily searchable information on multiple topics.  Www.medlineplus.gov : medication info, interactive tutorials, watch real surgeries online  Www.familydoctor.org : good info from the Academy of Family Physicians  Www.cdc.gov : public health info, travel advisories, epidemics (H1N1)  Www.aap.org : children's health info, normal development, vaccinations  Www.health.[facility].[place].us : [place] dept of health, public health issues in [place], N1N1    Your care team:                            Family Medicine Internal Medicine   MD Tre Hicks MD Shantel Branch-Fleming, MD Katya Georgiev PA-C Nam Ho, MD Pediatrics   ABY Snyder, KAVEH Phillips APRN CNP   MD Maritza Condon MD Deborah Mielke, MD Kim Thein, APRN [facility]      Clinic hours: Monday - Thursday 7 am-7 pm; Fridays 7 am-5 pm.   Urgent care: Monday - Friday 11 am-9 pm; Saturday and Sunday 9 am-5 pm.  Pharmacy : Monday -Thursday 8 am-8 pm; Friday 8 am-6 pm; Saturday and Sunday 9 am-5 pm.     Clinic: [phone]   Pharmacy: [phone]    Denosumab Solution for injection  What is this medicine?  DENOSUMAB (den oh marbella mab) slows bone breakdown. Prolia is used to treat osteoporosis in women after menopause and in men. Xgeva is used to prevent bone fractures and other bone problems caused by cancer bone metastases. Xgeva is also used to treat giant cell tumor of the bone.  This  medicine may be used for other purposes; ask your health care provider or pharmacist if you have questions.  What should I tell my health care provider before I take this medicine?  They need to know if you have any of these conditions:    dental disease    eczema    infection or history of infections    kidney disease or on dialysis    low blood calcium or vitamin D    malabsorption syndrome    scheduled to have surgery or tooth extraction    taking medicine that contains denosumab    thyroid or parathyroid disease    an unusual reaction to denosumab, other medicines, foods, dyes, or preservatives    pregnant or trying to get pregnant    breast-feeding  How should I use this medicine?  This medicine is for injection under the skin. It is given by a health care professional in a hospital or clinic setting.  If you are getting Prolia, a special MedGuide will be given to you by the pharmacist with each prescription and refill. Be sure to read this information carefully each time.  For Prolia, talk to your pediatrician regarding the use of this medicine in children. Special care may be needed. For Xgeva, talk to your pediatrician regarding the use of this medicine in children. While this drug may be prescribed for children as young as 13 years for selected conditions, precautions do apply.  Overdosage: If you think you've taken too much of this medicine contact a poison control center or emergency room at once.  NOTE: This medicine is only for you. Do not share this medicine with others.  What if I miss a dose?  It is important not to miss your dose. Call your doctor or health care professional if you are unable to keep an appointment.  What may interact with this medicine?  Do not take this medicine with any of the following medications:    other medicines containing denosumab  This medicine may also interact with the following medications:    medicines that suppress the immune system    medicines that treat  cancer    steroid medicines like prednisone or cortisone  This list may not describe all possible interactions. Give your health care provider a list of all the medicines, herbs, non-prescription drugs, or dietary supplements you use. Also tell them if you smoke, drink alcohol, or use illegal drugs. Some items may interact with your medicine.  What should I watch for while using this medicine?  Visit your doctor or health care professional for regular checks on your progress. Your doctor or health care professional may order blood tests and other tests to see how you are doing.  Call your doctor or health care professional if you get a cold or other infection while receiving this medicine. Do not treat yourself. This medicine may decrease your body's ability to fight infection.  You should make sure you get enough calcium and vitamin D while you are taking this medicine, unless your doctor tells you not to. Discuss the foods you eat and the vitamins you take with your health care professional.  See your dentist regularly. Brush and floss your teeth as directed. Before you have any dental work done, tell your dentist you are receiving this medicine.  Do not become pregnant while taking this medicine or for 5 months after stopping it. Women should inform their doctor if they wish to become pregnant or think they might be pregnant. There is a potential for serious side effects to an unborn child. Talk to your health care professional or pharmacist for more information.  What side effects may I notice from receiving this medicine?  Side effects that you should report to your doctor or health care professional as soon as possible:    allergic reactions like skin rash, itching or hives, swelling of the face, lips, or tongue    breathing problems    chest pain    fast, irregular heartbeat    feeling faint or lightheaded, falls    fever, chills, or any other sign of infection    muscle spasms, tightening, or  twitches    numbness or tingling    skin blisters or bumps, or is dry, peels, or red    slow healing or unexplained pain in the mouth or jaw    unusual bleeding or bruising  Side effects that usually do not require medical attention (Report these to your doctor or health care professional if they continue or are bothersome.):    muscle pain    stomach upset, gas  This list may not describe all possible side effects. Call your doctor for medical advice about side effects. You may report side effects to FDA at 0-317-WFO-5335.  Where should I keep my medicine?  This medicine is only given in a clinic, doctor's office, or other health care setting and will not be stored at home.  NOTE: This sheet is a summary. It may not cover all possible information. If you have questions about this medicine, talk to your doctor, pharmacist, or health care provider.  NOTE:This sheet is a summary. It may not cover all possible information. If you have questions about this medicine, talk to your doctor, pharmacist, or health care provider. Copyright  2016 [employer] Standard        Raloxifene tablets  Brand Name: Evista  What is this medicine?  RALOXIFENE (ral OX i feen) reduces the amount of calcium lost from bones. It is used to treat and prevent osteoporosis in women who have experienced menopause.  How should I use this medicine?  Take this medicine by mouth with a glass of water. Follow the directions on the prescription label. The tablets can be taken with or without food. Take your doses at regular intervals. Do not take your medicine more often than directed.  Talk to your pediatrician regarding the use of this medicine in children. Special care may be needed.  What side effects may I notice from receiving this medicine?  Side effects that you should report to your doctor or health care professional as soon as possible:    change in vision    chest pain    difficulty breathing    leg pain or swelling    skin rash, itching  Side  effects that usually do not require medical attention (report to your doctor or health care professional if they continue or are bothersome):    fluid build-up    leg cramps    stomach pain    sweating  What may interact with this medicine?    ampicillin    cholestyramine    colestipol    diazepam    diazoxide    female hormones like hormone replacement therapy    lidocaine    warfarin  What if I miss a dose?  If you miss a dose, take it as soon as you can. If it is almost time for your next dose, take only that dose. Do not take double or extra doses.  Where should I keep my medicine?  Keep out of the reach of children.  Store at room temperature between 15 and 30 degrees C (59 and 86 degrees F). Throw away any unused medicine after the expiration date.  What should I tell my health care provider before I take this medicine?  They need to know if you have any of these conditions:    a history of blood clots    cancer    heart failure    liver disease    premenopausal    an unusual or allergic reaction to raloxifene, other medicines, foods, dyes, or preservatives    pregnant or trying to get pregnant    breast-feeding  What should I watch for while using this medicine?  Visit your doctor or health care professional for regular checks on your progress. Do not stop taking this medicine except on the advice of your doctor or health care professional.  You should make sure you get enough calcium and vitamin D in your diet while you are taking this medicine. Discuss your dietary needs with your health care professional or nutritionist.  Exercise may help to prevent bone loss. Discuss your exercise needs with your doctor or health care professional.  This medicine can rarely cause blood clots. You should avoid long periods of bed rest while taking this medicine. If you are going to have surgery, tell your doctor or health care professional that you are taking this medicine. This medicine should be stopped at least 3 days  before surgery. After surgery, it should be restarted only after you are walking again. It should not be restarted while you still need long periods of bed rest.  You should not smoke while taking this medicine. Smoking may also increase your risk of blood clots. Smoking can also decrease the effects of this medicine.  This medicine does not prevent hot flashes. It may cause hot flashes in some patients at the start of therapy.  NOTE:This sheet is a summary. It may not cover all possible information. If you have questions about this medicine, talk to your doctor, pharmacist, or health care provider. Copyright  2017 Elsevier

## 2017-11-16 NOTE — NURSING NOTE
"Chief Complaint   Patient presents with     Musculoskeletal Problem     left hand pain       Initial /69 (BP Location: Left arm, Patient Position: Chair, Cuff Size: Adult Regular)  Pulse 67  Temp 97.7  F (36.5  C) (Oral)  Ht 5' 3\" (1.6 m)  Wt 160 lb (72.6 kg)  SpO2 99%  BMI 28.34 kg/m2 Estimated body mass index is 28.34 kg/(m^2) as calculated from the following:    Height as of this encounter: 5' 3\" (1.6 m).    Weight as of this encounter: 160 lb (72.6 kg).  Medication Reconciliation: complete     Jorge Forrester MA      "

## 2017-11-16 NOTE — PROGRESS NOTES
SUBJECTIVE:   Linda Spicer is a 86 year old female who presents to clinic today for the following health issues:    Joint Pain    Onset: 1 day    Description:   Location: left hand  Character: Sharp    Intensity: mild, severe    Progression of Symptoms: same    Accompanying Signs & Symptoms:  Other symptoms: weakness of left hand and discoloration of left hand    History:   Previous similar pain: no       Precipitating factors:   Trauma or overuse: YES- fell on 9-11-17    Alleviating factors:  Improved by: none  Therapies Tried and outcome: ice pack, little relief       Hypertension Follow-up    Low Salt Diet: no    Adverse effects: none    Compliance: excellent    Secondary causes: none    Chronic kidney disease: no    Hyperthyroidism: no    Anxiety: no    Decongestants: no    Substance abuse: no    Diabetes: no    Ischemic heart disease: no    Stroke: no    Hyperlipidemia:no           Patient Active Problem List   Diagnosis     ACP (advance care planning)     Hypertension goal BP (blood pressure) < 150/90     Hypothyroid     Diffuse idiopathic pulmonary fibrosis (H)     Macular degeneration, left eye     Irritable bowel syndrome     Encounter for palliative care     Adjustment disorder with anxious mood     Mild anemia     DDD (degenerative disc disease), lumbar     CKD (chronic kidney disease) stage 3, GFR 30-59 ml/min     History of blood transfusion     Aftercare following surgery     S/P lumbar laminectomy     High risk medication use     Age-related osteoporosis without current pathological fracture     Atrophic vaginitis     Fecal incontinence     Female stress incontinence     Impingement syndrome of both shoulders     UIP (usual interstitial pneumonitis) (H)     High risk medications (not anticoagulants) long-term use     Heart failure with preserved ejection fraction (H)     Congestive heart failure with preserved LV function, NYHA class 3 (H)     Other specified hypothyroidism     Rheumatoid  arthritis involving multiple sites with positive rheumatoid factor (H)     Health Care Home     Sick sinus syndrome (H)     Cardiac pacemaker - Medtronic dual lead pacemaker - Not Dependent - MRI Safe     Past Surgical History:   Procedure Laterality Date     APPENDECTOMY       BIOPSY      hemorrhoidectomy     ENT SURGERY      tonsillectomy     GYN SURGERY      3 D & C's     HYSTERECTOMY, PAP NO LONGER INDICATED       LAMINECTOMY LUMBAR ONE LEVEL N/A 10/13/2015    Procedure: LAMINECTOMY LUMBAR ONE LEVEL;  Surgeon: Fransico Toussaint MD;  Location:  OR       Social History   Substance Use Topics     Smoking status: Never Smoker     Smokeless tobacco: Never Used     Alcohol use Yes      Comment: rare wine      Family History   Problem Relation Age of Onset     Hypertension Mother      Psychotic Disorder Father      DIABETES Son      DIABETES Daughter      Blood Disease Daughter          Allergies   Allergen Reactions     Cephalexin Hcl Diarrhea     Gabapentin Other (See Comments)     Dizzsiness     Naproxen GI Disturbance     Perfume      Lactase Other (See Comments)     Macrobid [Nitrofurantoin Anhydrous]      Possibly related to lung disease      Sulfa Drugs      Throat swelling     Xanax [Alprazolam] Other (See Comments)     Dizziness      Recent Labs   Lab Test  11/10/17   0925  11/01/17   0831  08/30/17   1214  08/18/17   0934   03/24/17   1401  02/16/17   0949   06/03/16   0756   05/22/14   0821   LDL   --    --   76   --    --    --    --    --   109*   --   97   HDL   --    --   50   --    --    --    --    --   47*   --   42*   TRIG   --    --   101   --    --    --    --    --   75   --   84   ALT  35  33   --   31   < >  36  21   < >  36   < >   --    CR  1.23*  1.41*   --   1.18*   < >  1.31*  1.24*   < >  1.44*   < >  1.21*   GFRESTIMATED  41*  35*   --   43*   < >  39*  41*   < >  35*   < >  43*   GFRESTBLACK  50*  43*   --   53*   < >  47*  50*   < >  42*   < >  52*   POTASSIUM   --   4.3   --     "--    --   3.7  4.7   < >  5.1   < >  4.1   TSH   --    --   0.77   --    --    --   0.93   < >   --    < >  0.58    < > = values in this interval not displayed.      BP Readings from Last 3 Encounters:   11/16/17 116/62   11/10/17 114/74   11/01/17 136/59    Wt Readings from Last 3 Encounters:   11/16/17 160 lb (72.6 kg)   11/10/17 154 lb 9.6 oz (70.1 kg)   11/07/17 159 lb (72.1 kg)                  ROS:  C: NEGATIVE for fever, chills, change in weight  I: NEGATIVE for worrisome rashes, moles or lesions  E: NEGATIVE for vision changes or irritation  E/M: NEGATIVE for ear, mouth and throat problems  R: NEGATIVE for significant cough or SOB  CV: NEGATIVE for chest pain, palpitations or peripheral edema  GI: NEGATIVE for nausea, abdominal pain, heartburn, or change in bowel habits  : NEGATIVE for frequency, dysuria, or hematuria  MUSCULOSKELETAL:POSITIVE  for inflammatory polyarthropathy, 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  N: NEGATIVE for weakness, dizziness or paresthesias  ENDOCRINE: POSITIVE  for osteoporosis, with recent DEXA scan last 6/26/17 showing T-scores of -2.7 (left hip), -3.1 (lumbar spine) and  -2.3 (right hip)  HEME/ALLERGY/IMMUNE: POSITIVE  for chronic anticoagulation with Eliquis.  P: NEGATIVE for changes in mood or affect    OBJECTIVE:     /62  Pulse 67  Temp 97.7  F (36.5  C) (Oral)  Ht 5' 3\" (1.6 m)  Wt 160 lb (72.6 kg)  SpO2 99%  BMI 28.34 kg/m2  Body mass index is 28.34 kg/(m^2).  GENERAL: healthy, alert and no distress  EYES: Eyes grossly normal to inspection, PERRL and conjunctivae and sclerae normal  HENT: ear canals and TM's normal, nose and mouth without ulcers or lesions  NECK: no adenopathy, no asymmetry, masses, or scars and thyroid normal to palpation  RESP: lungs clear to auscultation - no rales, rhonchi or wheezes  CV: regular rate and rhythm, normal S1 S2, no S3 or S4, no murmur, click or rub, no peripheral edema and peripheral pulses strong  ABDOMEN: soft, nontender, no " hepatosplenomegaly, no masses and bowel sounds normal  MS: Tenderness on palpation of left 4th & 5th carpometacarpal joints.  SKIN: no suspicious lesions or rashes  NEURO: Normal strength and tone, mentation intact and speech normal  PSYCH: mentation appears normal, affect normal/bright    Diagnostic Test Results:  Results for orders placed or performed in visit on 11/16/17   XR Hand Left G/E 3 Views    Narrative    LEFT HAND THREE OR MORE VIEWS   11/16/2017 2:10 PM     HISTORY: Pain of left hand.    COMPARISON: 6/21/2016.      Impression    IMPRESSION: Severe loss of carpal joint space throughout all the  carpals again noted. The appearance is similar to prior x-ray. No  lucent fracture lines identified. Narrowing and sclerotic changes seen  at the radiocarpal and ulnocarpal joint spaces. The bones appear  demineralized.     SHIRLEY BARRETT MD       ASSESSMENT/PLAN:     (M79.642) Pain of left hand  (primary encounter diagnosis)  Comment: No evidence of fractures. Most likely due to tendinopathy that can be verified by MRI.  Plan: XR Hand Left G/E 3 Views            (I10) Hypertension goal BP (blood pressure) < 150/90  Comment: Within goal range.  Plan: losartan (COZAAR) 25 MG tablet            (M81.0) Age-related osteoporosis without current pathological fracture  Comment: Bone density is worse in comparison to 2012 despite chronic use of bisphophonates. Switch to Evist. No absolute contraindications since patient is already anticoagulated with Warfarin.  Plan: raloxifene (EVISTA) 60 MG tablet, CANCELED:         Vitamin D Deficiency          Follow-up visit if condition worsens.    Tre Lockett MD  [facility]

## 2017-11-16 NOTE — MR AVS SNAPSHOT
After Visit Summary   11/16/2017    Linda Spicer    MRN: [id number]           Patient Information     Date Of Birth          6/13/1931        Visit Information        Provider Department      11/16/2017 1:20 PM Tre Lockett MD [facility]        Today's Diagnoses     Pain of left hand    -  1    Hypertension goal BP (blood pressure) < 150/90        Age-related osteoporosis without current pathological fracture          Care Instructions    At [facility], we strive to deliver an exceptional experience to you, every time we see you.  If you receive a survey in the mail, please send us back your thoughts. We really do value your feedback.    Based on your medical history, these are the current health maintenance/preventive care services that you are due for (some may have been done at this visit.)  There are no preventive care reminders to display for this patient.      Suggested websites for health information:  Www."[employer] : Up to date and easily searchable information on multiple topics.  Www.medlineplus.gov : medication info, interactive tutorials, watch real surgeries online  Www.familydoctor.org : good info from the Academy of Family Physicians  Www.cdc.gov : public health info, travel advisories, epidemics (H1N1)  Www.aap.org : children's health info, normal development, vaccinations  Www.health.state.[place].us : [place] dept of health, public health issues in [place], N1N1    Your care team:                            Family Medicine Internal Medicine   MD Tre Hicks MD Shantel Branch-Fleming, MD Katya Georgiev PA-C Nam Ho, MD Pediatrics   ABY Snyder, KAVEH Phillips APRN MD Maritza Gutierres MD Deborah Mielke, MD Kim Thein, SUNNI CNP      Clinic hours: Monday - Thursday 7 am-7 pm; Fridays 7 am-5 pm.   Urgent care: Monday - Friday 11 am-9 pm; Saturday and Sunday 9 am-5  pm.  Pharmacy : Monday -Thursday 8 am-8 pm; Friday 8 am-6 pm; Saturday and Sunday 9 am-5 pm.     Clinic: [phone]   Pharmacy: [phone]    Denosumab Solution for injection  What is this medicine?  DENOSUMAB (den oh marbella mab) slows bone breakdown. Prolia is used to treat osteoporosis in women after menopause and in men. Xgeva is used to prevent bone fractures and other bone problems caused by cancer bone metastases. Xgeva is also used to treat giant cell tumor of the bone.  This medicine may be used for other purposes; ask your health care provider or pharmacist if you have questions.  What should I tell my health care provider before I take this medicine?  They need to know if you have any of these conditions:    dental disease    eczema    infection or history of infections    kidney disease or on dialysis    low blood calcium or vitamin D    malabsorption syndrome    scheduled to have surgery or tooth extraction    taking medicine that contains denosumab    thyroid or parathyroid disease    an unusual reaction to denosumab, other medicines, foods, dyes, or preservatives    pregnant or trying to get pregnant    breast-feeding  How should I use this medicine?  This medicine is for injection under the skin. It is given by a health care professional in a hospital or clinic setting.  If you are getting Prolia, a special MedGuide will be given to you by the pharmacist with each prescription and refill. Be sure to read this information carefully each time.  For Prolia, talk to your pediatrician regarding the use of this medicine in children. Special care may be needed. For Xgeva, talk to your pediatrician regarding the use of this medicine in children. While this drug may be prescribed for children as young as 13 years for selected conditions, precautions do apply.  Overdosage: If you think you've taken too much of this medicine contact a poison control center or emergency room at once.  NOTE: This medicine is  only for you. Do not share this medicine with others.  What if I miss a dose?  It is important not to miss your dose. Call your doctor or health care professional if you are unable to keep an appointment.  What may interact with this medicine?  Do not take this medicine with any of the following medications:    other medicines containing denosumab  This medicine may also interact with the following medications:    medicines that suppress the immune system    medicines that treat cancer    steroid medicines like prednisone or cortisone  This list may not describe all possible interactions. Give your health care provider a list of all the medicines, herbs, non-prescription drugs, or dietary supplements you use. Also tell them if you smoke, drink alcohol, or use illegal drugs. Some items may interact with your medicine.  What should I watch for while using this medicine?  Visit your doctor or health care professional for regular checks on your progress. Your doctor or health care professional may order blood tests and other tests to see how you are doing.  Call your doctor or health care professional if you get a cold or other infection while receiving this medicine. Do not treat yourself. This medicine may decrease your body's ability to fight infection.  You should make sure you get enough calcium and vitamin D while you are taking this medicine, unless your doctor tells you not to. Discuss the foods you eat and the vitamins you take with your health care professional.  See your dentist regularly. Brush and floss your teeth as directed. Before you have any dental work done, tell your dentist you are receiving this medicine.  Do not become pregnant while taking this medicine or for 5 months after stopping it. Women should inform their doctor if they wish to become pregnant or think they might be pregnant. There is a potential for serious side effects to an unborn child. Talk to your health care professional or  pharmacist for more information.  What side effects may I notice from receiving this medicine?  Side effects that you should report to your doctor or health care professional as soon as possible:    allergic reactions like skin rash, itching or hives, swelling of the face, lips, or tongue    breathing problems    chest pain    fast, irregular heartbeat    feeling faint or lightheaded, falls    fever, chills, or any other sign of infection    muscle spasms, tightening, or twitches    numbness or tingling    skin blisters or bumps, or is dry, peels, or red    slow healing or unexplained pain in the mouth or jaw    unusual bleeding or bruising  Side effects that usually do not require medical attention (Report these to your doctor or health care professional if they continue or are bothersome.):    muscle pain    stomach upset, gas  This list may not describe all possible side effects. Call your doctor for medical advice about side effects. You may report side effects to FDA at 4-256-FDA-9880.  Where should I keep my medicine?  This medicine is only given in a clinic, doctor's office, or other health care setting and will not be stored at home.  NOTE: This sheet is a summary. It may not cover all possible information. If you have questions about this medicine, talk to your doctor, pharmacist, or health care provider.  NOTE:This sheet is a summary. It may not cover all possible information. If you have questions about this medicine, talk to your doctor, pharmacist, or health care provider. Copyright  2016 [employer] Standard        Raloxifene tablets  Brand Name: Evista  What is this medicine?  RALOXIFENE (ral OX i feen) reduces the amount of calcium lost from bones. It is used to treat and prevent osteoporosis in women who have experienced menopause.  How should I use this medicine?  Take this medicine by mouth with a glass of water. Follow the directions on the prescription label. The tablets can be taken with or without  food. Take your doses at regular intervals. Do not take your medicine more often than directed.  Talk to your pediatrician regarding the use of this medicine in children. Special care may be needed.  What side effects may I notice from receiving this medicine?  Side effects that you should report to your doctor or health care professional as soon as possible:    change in vision    chest pain    difficulty breathing    leg pain or swelling    skin rash, itching  Side effects that usually do not require medical attention (report to your doctor or health care professional if they continue or are bothersome):    fluid build-up    leg cramps    stomach pain    sweating  What may interact with this medicine?    ampicillin    cholestyramine    colestipol    diazepam    diazoxide    female hormones like hormone replacement therapy    lidocaine    warfarin  What if I miss a dose?  If you miss a dose, take it as soon as you can. If it is almost time for your next dose, take only that dose. Do not take double or extra doses.  Where should I keep my medicine?  Keep out of the reach of children.  Store at room temperature between 15 and 30 degrees C (59 and 86 degrees F). Throw away any unused medicine after the expiration date.  What should I tell my health care provider before I take this medicine?  They need to know if you have any of these conditions:    a history of blood clots    cancer    heart failure    liver disease    premenopausal    an unusual or allergic reaction to raloxifene, other medicines, foods, dyes, or preservatives    pregnant or trying to get pregnant    breast-feeding  What should I watch for while using this medicine?  Visit your doctor or health care professional for regular checks on your progress. Do not stop taking this medicine except on the advice of your doctor or health care professional.  You should make sure you get enough calcium and vitamin D in your diet while you are taking this medicine.  Discuss your dietary needs with your health care professional or nutritionist.  Exercise may help to prevent bone loss. Discuss your exercise needs with your doctor or health care professional.  This medicine can rarely cause blood clots. You should avoid long periods of bed rest while taking this medicine. If you are going to have surgery, tell your doctor or health care professional that you are taking this medicine. This medicine should be stopped at least 3 days before surgery. After surgery, it should be restarted only after you are walking again. It should not be restarted while you still need long periods of bed rest.  You should not smoke while taking this medicine. Smoking may also increase your risk of blood clots. Smoking can also decrease the effects of this medicine.  This medicine does not prevent hot flashes. It may cause hot flashes in some patients at the start of therapy.  NOTE:This sheet is a summary. It may not cover all possible information. If you have questions about this medicine, talk to your doctor, pharmacist, or health care provider. Copyright  2017 [employer]                Follow-ups after your visit        Follow-up notes from your care team     Return if symptoms worsen or fail to improve.      Your next 10 appointments already scheduled     Dec 08, 2017  9:00 AM CST   Level 2 with [place] 3 INFUSION   [facility]    [id number] [place] [id number]   [phone]            Jan 05, 2018  9:30 AM CST   Level 2 with [place] 9 INFUSION   [facility]    [address] [place] [id number]   [phone]            Feb 06, 2018  9:20 AM CST   [facility] Visit with [facility] Davenport MD   [facility]    [address] [place] [id number]   [phone]            Mar 12, 2018  2:10 PM CDT   Return Visit with Emeterio Jamil  "MD Denia   [facility]    [address] [place] [id number] [phone]            Apr 04, 2018  4:00 PM CDT   Return Visit with DEVICE CHECK RN CARD   [facility]    [address] [address] [id number] [phone]              Who to contact     If you have questions or need follow up information about today's clinic visit or your schedule please contact [facility] directly at [phone].  Normal or non-critical lab and imaging results will be communicated to you by [employer], letter or phone within 4 business days after the clinic has received the results. If you do not hear from us within 7 days, please contact the clinic through [employer] or phone. If you have a critical or abnormal lab result, we will notify you by phone as soon as possible.  Submit refill requests through [email or web address] or call your pharmacy and they will forward the refill request to us. Please allow 3 business days for your refill to be completed.          Additional Information About Your Visit        [employer] Information     [email or web address] gives you secure access to your electronic health record. If you see a primary care provider, you can also send messages to your care team and make appointments. If you have questions, please call your primary care clinic.  If you do not have a primary care provider, please call [phone] and they will assist you.        Care EveryWhere ID     This is your Care EveryWhere ID. This could be used by other organizations to access your [place] medical records  [id number]        Your Vitals Were     Pulse Temperature Height Pulse Oximetry BMI (Body Mass Index)       67 97.7  F (36.5  C) (Oral) 5' 3\" (1.6 m) 99% 28.34 kg/m2        Blood Pressure from Last 3 Encounters:   11/16/17 116/62   11/10/17 114/74   11/01/17 136/59    Weight from Last 3 Encounters:   11/16/17 160 lb " (72.6 kg)   11/10/17 154 lb 9.6 oz (70.1 kg)   11/07/17 159 lb (72.1 kg)              We Performed the Following     XR Hand Left G/E 3 Views          Today's Medication Changes          These changes are accurate as of: 11/16/17 11:59 PM.  If you have any questions, ask your nurse or doctor.               Start taking these medicines.        Dose/Directions    raloxifene 60 MG tablet   Commonly known as:  Evista   Used for:  Age-related osteoporosis without current pathological fracture   Started by:  Tre Lockett MD        Dose:  60 mg   Take 1 tablet (60 mg) by mouth daily   Quantity:  30 tablet   Refills:  5         These medicines have changed or have updated prescriptions.        Dose/Directions    metoprolol 25 MG 24 hr tablet   Commonly known as:  TOPROL-XL   This may have changed:    - how much to take  - when to take this   Used for:  Chronic atrial fibrillation (H)        Dose:  25 mg   Take 1 tablet (25 mg) by mouth every morning   Quantity:  90 tablet   Refills:  3            Where to get your medicines      These medications were sent [place] [facility] [place] - [address]  [address], [facility] [address]     Phone:  [phone]     losartan 25 MG tablet         Some of these will need a paper prescription and others can be bought over the counter.  Ask your nurse if you have questions.     Bring a paper prescription for each of these medications     raloxifene 60 MG tablet                Primary Care Provider Office Phone # Fax #    Tre Lockett [phone]       [address]  [facility] [address]        Equal Access to Services     [facility]: Hadii rakesh schuster hadasho Soomaali, waaxda luqadaha, qaybta kaalmada adeegyada, zahraa moss . So [facility] [phone].    ATENCIÓN: Si habla español, tiene a merchant disposición servicios gratuitos de asistencia lingüística. Llame al [phone].    We  comply with applicable federal civil rights laws and [place] laws. We do not discriminate on the basis of race, color, national origin, age, disability, sex, sexual orientation, or gender identity.            Thank you!     Thank you for choosing [facility]  for your care. Our goal is always to provide you with excellent care. Hearing back from our patients is one way we can continue to improve our services. Please take a few minutes to complete the written survey that you may receive in the mail after your visit with us. Thank you!             Your Updated Medication List - Protect others around you: Learn how to safely use, store and throw away your medicines at www.disposemymeds.org.          This list is accurate as of: 11/16/17 11:59 PM.  Always use your most recent med list.                   Brand Name Dispense Instructions for use Diagnosis    apixaban ANTICOAGULANT 2.5 MG tablet    ELIQUIS    60 tablet    Take 1 tablet (2.5 mg) by mouth 2 times daily    Atrial flutter, paroxysmal (H)       azaTHIOprine 50 MG tablet    IMURAN    90 tablet    Take 1 tablet (50 mg) by mouth daily    Rheumatoid arthritis involving multiple sites with positive rheumatoid factor (H)       bismuth subsalicylate 262 MG Tabs     80 tablet    Take 1 tablet by mouth every 4 hours as needed    Acute infectious diarrhea, Enteritis due to Norovirus       Blood Pressure Monitor Kit     1 kit    1 kit daily as needed    CHF (congestive heart failure) (H)       calcium-vitamin D 600-400 MG-UNIT per tablet    CALTRATE     Take 1 tablet by mouth 2 times daily        cholestyramine 4 G Packet    QUESTRAN    60 each    Take 1 packet (4 g) by mouth 2 times daily (with meals)    Acute diarrhea       ciprofloxacin 250 MG tablet    CIPRO    90 tablet    Take 1 tablet (250 mg) by mouth daily    Chronic UTI       Cranberry 180 MG Caps      Take 1 capsule by mouth daily Unsure of strength        fish oil-omega-3 fatty acids 1000  MG capsule      Take 2 g by mouth daily. 2 capsules daily        FLAGYL PO      Take 500 mg by mouth 2 times daily    Acute infectious diarrhea       folic acid 1 MG tablet    FOLVITE    100 tablet    Take 1 tablet (1 mg) by mouth daily    Oral aphthae       GENTEAL MILD OP      Apply  to eye daily.        IBANdronate 150 MG tablet    BONIVA    1 tablet    Take 1 tablet (150 mg) by mouth every 30 days Take 60 minutes before am meal with 8 oz. water. Remain upright for 30 minutes.    Osteopenia, unspecified location       levothyroxine 75 MCG tablet    SYNTHROID/LEVOTHROID    90 tablet    TAKE ONE TABLET BY MOUTH EVERY DAY    Hypothyroidism, unspecified type       losartan 25 MG tablet    COZAAR    90 tablet    Take 1 tablet (25 mg) by mouth daily (with dinner) Hold if SBP < 110.    Hypertension goal BP (blood pressure) < 150/90       * LUCENTIS IO      1 injection every 4 Weeks        * ranibizumab 0.3 MG/0.05ML Soln    LUCENTIS     0.3 mg by Intravitreal route        metoprolol 25 MG 24 hr tablet    TOPROL-XL    90 tablet    Take 1 tablet (25 mg) by mouth every morning    Chronic atrial fibrillation (H)       nitroGLYcerin 0.4 MG sublingual tablet    NITROSTAT    25 tablet    Place 1 tablet (0.4 mg) under the tongue every 5 minutes as needed for chest pain    Chest pain       * order for DME     1 Box    Equipment being ordered: Dispense face mask. Mrs. Spicer is immunosuppressed due to rheumatoid arthritis.    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 Use with Albuterol.    Hypoxia       order for DME     1 each    Equipment being ordered: Dispense baffle, for use with nebulizer.    Pneumonia of left upper lobe due to Mycoplasma pneumoniae       * order for DME     1 each    Dispense SP Walker-small    Pain of toe of right foot, Status post bunionectomy       PRESERVISION AREDS PO      Take 2 tablets by mouth  daily        raloxifene 60 MG tablet    Evista    30 tablet    Take 1 tablet (60 mg) by mouth daily    Age-related osteoporosis without current pathological fracture       ranitidine 150 MG tablet    ZANTAC    60 tablet    Take 1 tablet (150 mg) by mouth 2 times daily    Gastroesophageal reflux disease without esophagitis       REFRESH P.M. Oint      Apply  to eye. Daily at bedtime        saccharomyces boulardii 250 MG capsule    FLORASTOR     Take 250 mg by mouth        senna-docusate 8.6-50 MG per tablet    SENOKOT-S;PERICOLACE    120 tablet    Take 2 tablets by mouth At Bedtime Hold if diarrhea occurs.    Constipation, chronic       SPIRONOLACTONE PO      Take 12.5 mg by mouth every morning Hold if SBP is less than 120.    Congestive heart failure with preserved LV function, NYHA class 3 (H)       triamcinolone 0.1 % cream    KENALOG    453.6 g    Apply sparingly to affected area on face three times daily.    Facial lesion       ZYRTEC ALLERGY 10 MG tablet   Generic drug:  cetirizine      Take 10 mg by mouth At Bedtime        * Notice:  This list has 5 medication(s) that are the same as other medications prescribed for you. Read the directions carefully, and ask your doctor or other care provider to review them with you.

## 2017-11-21 ENCOUNTER — OFFICE VISIT (OUTPATIENT)
Dept: FAMILY MEDICINE | Facility: CLINIC | Age: 82
End: 2017-11-21
Payer: MEDICARE

## 2017-11-21 VITALS
BODY MASS INDEX: 28.34 KG/M2 | DIASTOLIC BLOOD PRESSURE: 54 MMHG | WEIGHT: 160 LBS | TEMPERATURE: 98.5 F | OXYGEN SATURATION: 99 % | HEART RATE: 65 BPM | SYSTOLIC BLOOD PRESSURE: 132 MMHG

## 2017-11-21 DIAGNOSIS — D84.9 IMMUNOSUPPRESSION (H): ICD-10-CM

## 2017-11-21 DIAGNOSIS — R30.0 DYSURIA: Primary | ICD-10-CM

## 2017-11-21 DIAGNOSIS — M05.79 RHEUMATOID ARTHRITIS INVOLVING MULTIPLE SITES WITH POSITIVE RHEUMATOID FACTOR (H): ICD-10-CM

## 2017-11-21 DIAGNOSIS — R82.90 NONSPECIFIC FINDING ON EXAMINATION OF URINE: ICD-10-CM

## 2017-11-21 DIAGNOSIS — N39.0 URINARY TRACT INFECTION WITHOUT HEMATURIA, SITE UNSPECIFIED: ICD-10-CM

## 2017-11-21 LAB
ALBUMIN UR-MCNC: NEGATIVE MG/DL
APPEARANCE UR: ABNORMAL
BACTERIA #/AREA URNS HPF: ABNORMAL /HPF
BILIRUB UR QL STRIP: NEGATIVE
COLOR UR AUTO: YELLOW
GLUCOSE UR STRIP-MCNC: NEGATIVE MG/DL
HGB UR QL STRIP: ABNORMAL
KETONES UR STRIP-MCNC: NEGATIVE MG/DL
LEUKOCYTE ESTERASE UR QL STRIP: ABNORMAL
NITRATE UR QL: NEGATIVE
PH UR STRIP: 5.5 PH (ref 5–7)
RBC #/AREA URNS AUTO: ABNORMAL /HPF
SOURCE: ABNORMAL
SP GR UR STRIP: <=1.005 (ref 1–1.03)
UROBILINOGEN UR STRIP-ACNC: 0.2 EU/DL (ref 0.2–1)
WBC #/AREA URNS AUTO: >100 /HPF
WBC CLUMPS #/AREA URNS HPF: PRESENT /HPF

## 2017-11-21 PROCEDURE — 81001 URINALYSIS AUTO W/SCOPE: CPT | Performed by: PHYSICIAN ASSISTANT

## 2017-11-21 PROCEDURE — 87088 URINE BACTERIA CULTURE: CPT | Performed by: PHYSICIAN ASSISTANT

## 2017-11-21 PROCEDURE — 87186 SC STD MICRODIL/AGAR DIL: CPT | Performed by: PHYSICIAN ASSISTANT

## 2017-11-21 PROCEDURE — 87086 URINE CULTURE/COLONY COUNT: CPT | Performed by: PHYSICIAN ASSISTANT

## 2017-11-21 PROCEDURE — 99214 OFFICE O/P EST MOD 30 MIN: CPT | Performed by: PHYSICIAN ASSISTANT

## 2017-11-21 NOTE — PATIENT INSTRUCTIONS
"Take Cipro 250 mg by mouth every 12 hours for 1 week.      Return to clinic or Emergency Department for fevers, increasing pain, abdominal pain, vomiting or any other changes in condition.      Understanding Urinary Tract Infections (UTIs)  Most UTIs are caused by bacteria, although they may also be caused by viruses or fungi. Bacteria from the bowel are the most common source of infection. The infection may begin because of any of the following:  Sexual activity. During sex, germs can travel from the penis, vagina, or rectum into the urethra.   Germs on the skin outside the rectum may travel into the urethra. This is more common in women since the rectum and urethra are closer to each other than in men. Wiping from front to back after using the toilet and keeping the area clean can help prevent germs from getting to the urethra.  Blockage of urine flow through the urinary tract. If urine sits too long, germs may begin to grow out of control      Parts of the Urinary Tract  The infection can occur in any part of the urinary tract.  The kidneys collect and store urine.  The ureters carry urine from the kidneys to the bladder.  The bladder holds urine until you are ready to let it out.  The urethra carries urine from the bladder out of the body. It is shorter in women, so bacteria can move through it more easily. The urethra is longer in men, so a UTI is less likely to reach the bladder or kidneys in men.    A bladder infection (\"cystitis\" or \"UTI\") usually causes a constant urge to urinate and a burning when passing urine. Urine may be cloudy, smelly or dark. There may be pain in the lower abdomen. A bladder infection occurs when bacteria from the vaginal area enter the bladder opening (urethra). This can occur from sexual intercourse, wearing tight clothing, dehydration and other factors.  HOME CARE:  1. Drink lots of fluids (at least 6-8 glasses a day, unless you must restrict fluids for other medical reasons). " This will force the medicine into your urinary system and flush the bacteria out of your body. Cranberry juice has been shown to help clear out the bacteria.  2. Avoid sexual intercourse until your symptoms are gone.  3. A bladder infection is treated with antibiotics. You may also be given Pyridium (generic = phenazopyridine) to reduce the burning sensation. This medicine will cause your urine to become a bright orange color. The orange urine may stain clothing. You may wear a pad or panty-liner to protect clothing.  PREVENTING FUTURE INFECTIONS:  1. Always wipe from front to back after a bowel movement.  2. Keep the genital area clean and dry.  3. Drink plenty of fluids each day to avoid dehydration.  4. Urinate right after intercourse to flush out the bladder.  5. Wear cotton underwear and cotton-lined panty hose; avoid tight-fitting pants.  6. If you are on birth control pills and are having frequent bladder infections, discuss with your doctor.  FOLLOW UP: Return to this facility or see your doctor if ALL symptoms are not gone after three days of treatment.  GET PROMPT MEDICAL ATTENTION if any of the following occur:    Fever over 101 F (38.3 C)    No improvement by the third day of treatment    Increasing back or abdominal pain    Repeated vomiting; unable to keep medicine down    Weakness, dizziness or fainting    Vaginal discharge    Pain, redness or swelling in the labia (outer vaginal area)    8742-2201 [employer], [address] [place] [address]. All rights reserved. This information is not intended as a substitute for professional medical care. Always follow your healthcare professional's       [id number] [employer], [address] [place] [address]. All rights reserved. This information is not intended as a substitute for professional medical care. Always follow your healthcare professional's instructions.

## 2017-11-21 NOTE — PROGRESS NOTES
SUBJECTIVE:   Linda Spicer is a 86 year old female who presents to clinic today for the following health issues:      URINARY TRACT SYMPTOMS- noticed cloudy urine,  Having slight pelvic discomfort.  Has cipro at home.  Had been taking it for maintenance previously. Tolerates it very well.    Onset: This morning    Description:   Painful urination (Dysuria): no   Blood in urine (Hematuria): no   Delay in urine (Hesitency): no     Intensity: moderate    Progression of Symptoms:  same    Accompanying Signs & Symptoms:  Fever/chills: no   Flank pain no   Nausea and vomiting: no   Any vaginal symptoms: none  Abdominal/Pelvic Pain: NO    History:   History of frequent UTI's: YES  History of kidney stones: no   Sexually Active: no   Possibility of pregnancy: No    Precipitating factors:   none    Therapies Tried and outcome: none      Hx RA on immunomodulary        Allergies   Allergen Reactions     Cephalexin Hcl Diarrhea     Gabapentin Other (See Comments)     Dizzsiness     Naproxen GI Disturbance     Perfume      Lactase Other (See Comments)     Macrobid [Nitrofurantoin Anhydrous]      Possibly related to lung disease      Sulfa Drugs      Throat swelling     Xanax [Alprazolam] Other (See Comments)     Dizziness        Past Medical History:   Diagnosis Date     Adjustment disorder with anxious mood 5/18/2015     Advanced directives, counseling/discussion 8/30/2012    Patient states has Advance Directive and will bring in a copy to clinic. 8/30/2012   Tevin May  [facility] Medical Assistant \       Anemia 9/25/2015     Basal cell cancer      CHF (congestive heart failure) (H) 9/18/2014     CKD (chronic kidney disease) stage 3, GFR 30-59 ml/min 9/29/2015     DDD (degenerative disc disease), lumbar 9/25/2015     Diffuse idiopathic pulmonary fibrosis (H) 5/6/2013     Encounter for palliative care 5/18/2015     History of blood transfusion 9/29/2015     Hypertension goal BP (blood pressure) < 140/90 9/7/2012      Hypothyroid 9/7/2012     Irritable bowel syndrome 10/29/2013     Macular degeneration      Macular degeneration, 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     Nondisplaced spiral fracture of shaft of humerus      Osteoporosis 8/13/2013     Imo Update utility     RA (rheumatoid arthritis) (H) 5/7/2013     Rheumatic fever      Shingles      Spinal stenosis of lumbar region with neurogenic claudication 9/14/2015         Current Outpatient Prescriptions on File Prior to Visit:  losartan (COZAAR) 25 MG tablet Take 1 tablet (25 mg) by mouth daily (with dinner) Hold if SBP < 110.   raloxifene (EVISTA) 60 MG tablet Take 1 tablet (60 mg) by mouth daily   IBANdronate (BONIVA) 150 MG tablet Take 1 tablet (150 mg) by mouth every 30 days Take 60 minutes before am meal with 8 oz. water. Remain upright for 30 minutes.   ranitidine (ZANTAC) 150 MG tablet Take 1 tablet (150 mg) by mouth 2 times daily   azaTHIOprine (IMURAN) 50 MG tablet Take 1 tablet (50 mg) by mouth daily   apixaban ANTICOAGULANT (ELIQUIS) 2.5 MG tablet Take 1 tablet (2.5 mg) by mouth 2 times daily   order for DME Dispense  [facility]   ranibizumab (LUCENTIS) 0.3 MG/0.05ML SOLN 0.3 mg by Intravitreal route   metoprolol (TOPROL-XL) 25 MG 24 hr tablet Take 1 tablet (25 mg) by mouth every morning (Patient taking differently: Take 12.5 mg by mouth 2 times daily )   cholestyramine (QUESTRAN) 4 G Packet Take 1 packet (4 g) by mouth 2 times daily (with meals)   levothyroxine (SYNTHROID/LEVOTHROID) 75 MCG tablet TAKE ONE TABLET BY MOUTH EVERY DAY   folic acid (FOLVITE) 1 MG tablet Take 1 tablet (1 mg) by mouth daily   bismuth subsalicylate 262 MG TABS Take 1 tablet by mouth every 4 hours as needed   MetroNIDAZOLE (FLAGYL PO) Take 500 mg by mouth 2 times daily    ciprofloxacin (CIPRO) 250 MG tablet Take 1 tablet (250 mg) by mouth daily   SPIRONOLACTONE PO Take 12.5 mg by mouth every morning Hold if SBP is less than 120.   senna-docusate (SENOKOT-S;PERICOLACE) 8.6-50 MG per tablet Take 2  tablets by mouth At Bedtime Hold if diarrhea occurs.   Cranberry 180 MG CAPS Take 1 capsule by mouth daily Unsure of strength   saccharomyces boulardii (FLORASTOR) 250 MG capsule Take 250 mg by mouth   order for DME Equipment being ordered: Dispense baffle, for use with nebulizer.   order for DME Equipment being ordered: Nebulizer. Use with Albuterol.   order for DME Equipment being ordered: Dispense face mask.Mrs. Spicer is immunosuppressed due to rheumatoid arthritis.   triamcinolone (KENALOG) 0.1 % cream Apply sparingly to affected area on face three times daily.   Multiple Vitamins-Minerals (PRESERVISION AREDS PO) Take 2 tablets by mouth daily   Blood Pressure Monitor KIT 1 kit daily as needed   nitroglycerin (NITROSTAT) 0.4 MG SL tablet Place 1 tablet (0.4 mg) under the tongue every 5 minutes as needed for chest pain   Ranibizumab (LUCENTIS IO) 1 injection every 4 Weeks   cetirizine (ZYRTEC ALLERGY) 10 MG tablet Take 10 mg by mouth At Bedtime   Hypromellose (GENTEAL MILD OP) Apply  to eye daily.   Artificial Tear Ointment (REFRESH P.M.) OINT Apply  to eye. Daily at bedtime    calcium-vitamin D (CALTRATE) 600-400 MG-UNIT per tablet Take 1 tablet by mouth 2 times daily    fish oil-omega-3 fatty acids (FISH OIL) 1000 MG capsule Take 2 g by mouth daily. 2 capsules daily      Current Facility-Administered Medications on File Prior to Visit:  DOBUTamine 500 mg in dextrose 5% 250 mL (adult std)   DOBUTamine 500 mg in dextrose 5% 250 mL (adult std)       Social History   Substance Use Topics     Smoking status: Never Smoker     Smokeless tobacco: Never Used     Alcohol use Yes      Comment: rare wine        ROS:  General: negative for fever  ABD: + as above  : as above    OBJECTIVE:  /54 (BP Location: Left arm, Patient Position: Chair, Cuff Size: Adult Regular)  Pulse 65  Temp 98.5  F (36.9  C) (Oral)  Wt 160 lb (72.6 kg)  SpO2 99%  Breastfeeding? No  BMI 28.34 kg/m2   General:   awake, alert, and  cooperative.  NAD.   Head: Normocephalic, atraumatic.  Eyes: Conjunctiva clear, non icteric.   ABD: soft, no tenderness to palpation , no rigidity, guarding or rebound . No CVAT  Neuro: Alert and oriented - normal speech.     UA c/w UTI    ASSESSMENT:  Lower, uncomplicated urinary tract infection. Benign exam.      ICD-10-CM    1. Dysuria R30.0 *UA reflex to Microscopic and Culture ([facility] (except [place] and [place])     Urine Microscopic     CANCELED: *UA reflex to Microscopic and Culture ([facility] (except [place] and [place])   2. Nonspecific finding on examination of urine R82.90 Urine Culture Aerobic Bacterial   3. Urinary tract infection without hematuria, site unspecified N39.0    4. Rheumatoid arthritis involving multiple sites with positive rheumatoid factor (H) M05.79    5. 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  Drink plenty of fluids.  Prevention and treatment of UTI's discussed. Follow up with primary care physician if not improving.  Advised about symptoms which might herald more serious problems.

## 2017-11-21 NOTE — MR AVS SNAPSHOT
After Visit Summary   11/21/2017    Linda Spicer    MRN: [id number]           Patient Information     Date Of Birth          6/13/1931        Visit Information        Provider Department      11/21/2017 2:00 PM Antonio Lancaster PA [facility]        Today's Diagnoses     Dysuria    -  1    Nonspecific finding on examination of urine        Urinary tract infection without hematuria, site unspecified        Rheumatoid arthritis involving multiple sites with positive rheumatoid factor (H)        Immunosuppression (H)          Care Instructions    Take Cipro 250 mg by mouth every 12 hours for 1 week.      Return to clinic or Emergency Department for fevers, increasing pain, abdominal pain, vomiting or any other changes in condition.      Understanding Urinary Tract Infections (UTIs)  Most UTIs are caused by bacteria, although they may also be caused by viruses or fungi. Bacteria from the bowel are the most common source of infection. The infection may begin because of any of the following:  Sexual activity. During sex, germs can travel from the penis, vagina, or rectum into the urethra.   Germs on the skin outside the rectum may travel into the urethra. This is more common in women since the rectum and urethra are closer to each other than in men. Wiping from front to back after using the toilet and keeping the area clean can help prevent germs from getting to the urethra.  Blockage of urine flow through the urinary tract. If urine sits too long, germs may begin to grow out of control      Parts of the Urinary Tract  The infection can occur in any part of the urinary tract.  The kidneys collect and store urine.  The ureters carry urine from the kidneys to the bladder.  The bladder holds urine until you are ready to let it out.  The urethra carries urine from the bladder out of the body. It is shorter in women, so bacteria can move through it more easily. The urethra is  "longer in men, so a UTI is less likely to reach the bladder or kidneys in men.    A bladder infection (\"cystitis\" or \"UTI\") usually causes a constant urge to urinate and a burning when passing urine. Urine may be cloudy, smelly or dark. There may be pain in the lower abdomen. A bladder infection occurs when bacteria from the vaginal area enter the bladder opening (urethra). This can occur from sexual intercourse, wearing tight clothing, dehydration and other factors.  HOME CARE:  1. Drink lots of fluids (at least 6-8 glasses a day, unless you must restrict fluids for other medical reasons). This will force the medicine into your urinary system and flush the bacteria out of your body. Cranberry juice has been shown to help clear out the bacteria.  2. Avoid sexual intercourse until your symptoms are gone.  3. A bladder infection is treated with antibiotics. You may also be given Pyridium (generic = phenazopyridine) to reduce the burning sensation. This medicine will cause your urine to become a bright orange color. The orange urine may stain clothing. You may wear a pad or panty-liner to protect clothing.  PREVENTING FUTURE INFECTIONS:  1. Always wipe from front to back after a bowel movement.  2. Keep the genital area clean and dry.  3. Drink plenty of fluids each day to avoid dehydration.  4. Urinate right after intercourse to flush out the bladder.  5. Wear cotton underwear and cotton-lined panty hose; avoid tight-fitting pants.  6. If you are on birth control pills and are having frequent bladder infections, discuss with your doctor.  FOLLOW UP: Return to this facility or see your doctor if ALL symptoms are not gone after three days of treatment.  GET PROMPT MEDICAL ATTENTION if any of the following occur:    Fever over 101 F (38.3 C)    No improvement by the third day of treatment    Increasing back or abdominal pain    Repeated vomiting; unable to keep medicine down    Weakness, dizziness or fainting    Vaginal " discharge    Pain, redness or swelling in the labia (outer vaginal area)    2412-7590 [employer], [address] [place] [address], [place] [address]. All rights reserved. This information is not intended as a substitute for professional medical care. Always follow your healthcare professional's       [id number] [facility], [address] [place] [address], [place] [address]. All rights reserved. This information is not intended as a substitute for professional medical care. Always follow your healthcare professional's instructions.                Follow-ups after your visit        Follow-up notes from your care team     Return if symptoms worsen or fail to improve.      Your next 10 appointments already scheduled     Dec 08, 2017  9:00 AM CST   Level 2 with BAY 3 INFUSION   [facility] ([facility])    [id number] [place] [id number]   [phone]            Jan 05, 2018  9:30 AM CST   Level 2 with BAY 9 INFUSION   [facility] ([facility])    [address] [place] [id number] [phone]            Feb 06, 2018  9:20 AM CST   Return Visit with Mario Davenport MD   [facility] ([facility])    [address] [place] [address]   [phone]            Mar 12, 2018  2:10 PM CDT   Return Visit with Emeterio Loza MD   [facility])    [id number] [place] [id number] [phone]            Apr 04, 2018  4:00 PM CDT   Return Visit with DEVICE CHECK RN CARD   [facility])    [address] [place] [address] [phone]              Who to contact     If you have questions or need follow up information about today's clinic visit or your schedule please contact [facility] directly at [phone].  Normal or non-critical lab  and imaging results will be communicated to you by Buyapowahart, letter or phone within 4 business days after the clinic has received the results. If you do not hear from us within 7 days, please contact the clinic through [email or web address] or phone. If you have a critical or abnormal lab result, we will notify you by phone as soon as possible.  Submit refill requests through [email or web address] or call your pharmacy and they will forward the refill request to us. Please allow 3 business days for your refill to be completed.          Additional Information About Your Visit        [email or web address] Information     [email or web address] gives you secure access to your electronic health record. If you see a primary care provider, you can also send messages to your care team and make appointments. If you have questions, please call your primary care clinic.  If you do not have a primary care provider, please call [phone] and they will assist you.        Care EveryWhere ID     This is your Care EveryWhere ID. This could be used by other organizations to access your [place] medical records  [id number]        Your Vitals Were     Pulse Temperature Pulse Oximetry Breastfeeding? BMI (Body Mass Index)       65 98.5  F (36.9  C) (Oral) 99% No 28.34 kg/m2        Blood Pressure from Last 3 Encounters:   11/21/17 132/54   11/16/17 116/62   11/10/17 114/74    Weight from Last 3 Encounters:   11/21/17 160 lb (72.6 kg)   11/16/17 160 lb (72.6 kg)   11/10/17 154 lb 9.6 oz (70.1 kg)              We Performed the Following     *UA reflex to Microscopic and Culture ([place] and [facility] (except [place] and [place])     Urine Culture Aerobic Bacterial     Urine Microscopic          Today's Medication Changes          These changes are accurate as of: 11/21/17  2:22 PM.  If you have any questions, ask your nurse or doctor.               These medicines have changed or have updated prescriptions.        Dose/Directions    metoprolol 25 MG 24 hr tablet   Commonly known as:   TOPROL-XL   This may have changed:    - how much to take  - when to take this   Used for:  Chronic atrial fibrillation (H)        Dose:  25 mg   Take 1 tablet (25 mg) by mouth every morning   Quantity:  90 tablet   Refills:  3                Primary Care Provider Office Phone # Fax #    Tre Lockett [phone]       [address]  [facility] [address]        Equal Access to Services     [facility] S[facility]AR : Hadii aad ku hadasho Soomaali, waaxda luqadaha, qaybta kaalmada adeegyada, waxay idiin hayaan adeeg kharash la'emelina . So [facility] [phone].    ATENCIÓN: Si habla español, tiene a merchant disposición servicios gratuitos de asistencia lingüística. Llame al [phone].    We comply with applicable federal civil rights laws and [place] laws. We do not discriminate on the basis of race, color, national origin, age, disability, sex, sexual orientation, or gender identity.            Thank you!     Thank you for choosing [facility]  for your care. Our goal is always to provide you with excellent care. Hearing back from our patients is one way we can continue to improve our services. Please take a few minutes to complete the written survey that you may receive in the mail after your visit with us. Thank you!             Your Updated Medication List - Protect others around you: Learn how to safely use, store and throw away your medicines at www.disposemymeds.org.          This list is accurate as of: 11/21/17  2:22 PM.  Always use your most recent med list.                   Brand Name Dispense Instructions for use Diagnosis    apixaban ANTICOAGULANT 2.5 MG tablet    ELIQUIS    60 tablet    Take 1 tablet (2.5 mg) by mouth 2 times daily    Atrial flutter, paroxysmal (H)       azaTHIOprine 50 MG tablet    IMURAN    90 tablet    Take 1 tablet (50 mg) by mouth daily    Rheumatoid arthritis involving multiple sites with positive rheumatoid factor (H)       bismuth subsalicylate 262  MG Tabs     80 tablet    Take 1 tablet by mouth every 4 hours as needed    Acute infectious diarrhea, Enteritis due to Norovirus       Blood Pressure Monitor Kit     1 kit    1 kit daily as needed    CHF (congestive heart failure) (H)       calcium-vitamin D 600-400 MG-UNIT per tablet    CALTRATE     Take 1 tablet by mouth 2 times daily        cholestyramine 4 G Packet    QUESTRAN    60 each    Take 1 packet (4 g) by mouth 2 times daily (with meals)    Acute diarrhea       ciprofloxacin 250 MG tablet    CIPRO    90 tablet    Take 1 tablet (250 mg) by mouth daily    Chronic UTI       Cranberry 180 MG Caps      Take 1 capsule by mouth daily Unsure of strength        fish oil-omega-3 fatty acids 1000 MG capsule      Take 2 g by mouth daily. 2 capsules daily        FLAGYL PO      Take 500 mg by mouth 2 times daily    Acute infectious diarrhea       folic acid 1 MG tablet    FOLVITE    100 tablet    Take 1 tablet (1 mg) by mouth daily    Oral aphthae       GENTEAL MILD OP      Apply  to eye daily.        IBANdronate 150 MG tablet    BONIVA    1 tablet    Take 1 tablet (150 mg) by mouth every 30 days Take 60 minutes before am meal with 8 oz. water. Remain upright for 30 minutes.    Osteopenia, unspecified location       levothyroxine 75 MCG tablet    SYNTHROID/LEVOTHROID    90 tablet    TAKE ONE TABLET BY MOUTH EVERY DAY    Hypothyroidism, unspecified type       losartan 25 MG tablet    COZAAR    90 tablet    Take 1 tablet (25 mg) by mouth daily (with dinner) Hold if SBP < 110.    Hypertension goal BP (blood pressure) < 150/90       * LUCENTIS IO      1 injection every 4 Weeks        * ranibizumab 0.3 MG/0.05ML Soln    LUCENTIS     0.3 mg by Intravitreal route        metoprolol 25 MG 24 hr tablet    TOPROL-XL    90 tablet    Take 1 tablet (25 mg) by mouth every morning    Chronic atrial fibrillation (H)       nitroGLYcerin 0.4 MG sublingual tablet    NITROSTAT    25 tablet    Place 1 tablet (0.4 mg) under the tongue every  5 minutes as needed for chest pain    Chest pain       * order for DME     1 Box    Equipment being ordered: Dispense face mask. Mrs. Spicer is immunosuppressed due to rheumatoid arthritis.    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 Use with Albuterol.    Hypoxia       order for DME     1 each    Equipment being ordered: Dispense baffle, for use with nebulizer.    Pneumonia of left upper lobe due to Mycoplasma pneumoniae       * order for DME     1 each    Dispense SP Walker-small    Pain of toe of right foot, Status post bunionectomy       PRESERVISION AREDS PO      Take 2 tablets by mouth daily        raloxifene 60 MG tablet    Evista    30 tablet    Take 1 tablet (60 mg) by mouth daily    Age-related osteoporosis without current pathological fracture       ranitidine 150 MG tablet    ZANTAC    60 tablet    Take 1 tablet (150 mg) by mouth 2 times daily    Gastroesophageal reflux disease without esophagitis       REFRESH P.M. Oint      Apply  to eye. Daily at bedtime        saccharomyces boulardii 250 MG capsule    FLORASTOR     Take 250 mg by mouth        senna-docusate 8.6-50 MG per tablet    SENOKOT-S;PERICOLACE    120 tablet    Take 2 tablets by mouth At Bedtime Hold if diarrhea occurs.    Constipation, chronic       SPIRONOLACTONE PO      Take 12.5 mg by mouth every morning Hold if SBP is less than 120.    Congestive heart failure with preserved LV function, NYHA class 3 (H)       triamcinolone 0.1 % cream    KENALOG    453.6 g    Apply sparingly to affected area on face three times daily.    Facial lesion       ZYRTEC ALLERGY 10 MG tablet   Generic drug:  cetirizine      Take 10 mg by mouth At Bedtime        * Notice:  This list has 5 medication(s) that are the same as other medications prescribed for you. Read the directions carefully, and ask your doctor or other care provider to review them with  you.

## 2017-11-21 NOTE — LETTER
November 27, 2017      Linda Spicer  [address]   [place] [address]              Dear Linda Spicer      Your culture grew out bacteria that is sensitive to the antibiotic you have been given.  Complete the medication as prescribed and if you experience new, worsening or persistent symptoms, you should call or return for a recheck.     Enclosed is a copy of the results.  It was a pleasure to see you at your last appointment.    If you have any questions or concerns, please call myself or my nurse at [phone].      Sincerely,      Antonio Lancaster/MARIBEL Medel MA  [employer]      Results for orders placed or performed in visit on 11/21/17   *UA reflex to Microscopic and Culture ([place] and [facility] (except [place] and [place])   Result Value Ref Range    Color Urine Yellow     Appearance Urine Cloudy     Glucose Urine Negative NEG^Negative mg/dL    Bilirubin Urine Negative NEG^Negative    Ketones Urine Negative NEG^Negative mg/dL    Specific Gravity Urine <=1.005 1.003 - 1.035    Blood Urine Small (A) NEG^Negative    pH Urine 5.5 5.0 - 7.0 pH    Protein Albumin Urine Negative NEG^Negative mg/dL    Urobilinogen Urine 0.2 0.2 - 1.0 EU/dL    Nitrite Urine Negative NEG^Negative    Leukocyte Esterase Urine Large (A) NEG^Negative    Source Midstream Urine    Urine Microscopic   Result Value Ref Range    WBC Urine >100 (A) OTO2^O - 2 /HPF    RBC Urine 2-5 (A) OTO2^O - 2 /HPF    WBC Clumps Present (A) NEG^Negative /HPF    Bacteria Urine Many (A) NEG^Negative /HPF   Urine Culture Aerobic Bacterial   Result Value Ref Range    Specimen Description Midstream Urine     Culture Micro >100,000 colonies/mL  Citrobacter amalonaticus   (A)        Susceptibility    Citrobacter amalonaticus - JOSE     AMPICILLIN >=32 Resistant ug/mL     CEFAZOLIN* >=64 Resistant ug/mL      * Cefazolin JOSE breakpoints are for the treatment of uncomplicated urinary tract infections.  For the treatment of  systemic infections, please contact the laboratory for additional testing.     CEFOXITIN <=4 Sensitive ug/mL     CEFTAZIDIME <=1 Sensitive ug/mL     CEFTRIAXONE <=1 Sensitive ug/mL     CIPROFLOXACIN <=0.25 Sensitive ug/mL     GENTAMICIN <=1 Sensitive ug/mL     LEVOFLOXACIN <=0.12 Sensitive ug/mL     NITROFURANTOIN 32 Sensitive ug/mL     TOBRAMYCIN <=1 Sensitive ug/mL     Trimethoprim/Sulfa <=1/19 Sensitive ug/mL     AMPICILLIN/SULBACTAM <=2 Sensitive ug/mL     Piperacillin/Tazo <=4 Sensitive ug/mL     CEFEPIME <=1 Sensitive ug/mL                 RE: Linda M Dannielle   MRN: [id number]  : 1931  ENC DATE: 2017

## 2017-11-21 NOTE — NURSING NOTE
"Chief Complaint   Patient presents with     UTI     Started this morning.       Initial /54 (BP Location: Left arm, Patient Position: Chair, Cuff Size: Adult Regular)  Pulse 65  Temp 98.5  F (36.9  C) (Oral)  Wt 160 lb (72.6 kg)  SpO2 99%  Breastfeeding? No  BMI 28.34 kg/m2 Estimated body mass index is 28.34 kg/(m^2) as calculated from the following:    Height as of 11/16/17: 5' 3\" (1.6 m).    Weight as of this encounter: 160 lb (72.6 kg).  Medication Reconciliation: complete   Fernando Arellano MA        "

## 2017-11-22 ENCOUNTER — CARE COORDINATION (OUTPATIENT)
Dept: CARE COORDINATION | Facility: CLINIC | Age: 82
End: 2017-11-22

## 2017-11-22 RX ORDER — CEFUROXIME AXETIL 500 MG/1
500 TABLET ORAL 2 TIMES DAILY
Qty: 20 TABLET | Refills: 1 | Status: SHIPPED | OUTPATIENT
Start: 2017-11-22 | End: 2017-12-07

## 2017-11-22 NOTE — PROGRESS NOTES
Since patient is taking Cipro as prophylaxis, giving similar antibiotic is unlikely to be effective unless urine culture results are final.  I recommend Cefuroxime. Rx sent.

## 2017-11-22 NOTE — PROGRESS NOTES
Clinic Care Coordination Contact  OUTREACH    Clinical Concern:  Updated patient with provider response.  Per pt she will contact her daughter to  the new prescription.  Patient will talk with her daughter about arranging a f/u appointment with Dr. Lockett.     Pt did take her temp while writer was on the phone with her and it was 97.9.  Pt notes feeling more tired and took a nap this afternoon which is not her norm.   Reviewed with pt the importance of staying hydrated and that if her symptoms should worsen to seek medical care.     Plan:  Routing to primary RN CC for further review/f/u.    Swathi Brito RN BSN, PHN RN Care Coordinator  [facility]  [email or web address]  [phone]  11/22/2017 4:01 PM

## 2017-11-22 NOTE — PROGRESS NOTES
Clinic Care Coordination Contact  OUTREACH    Referral Information:  Referral Source: Self-patient/Caregiver  Reason for Contact: Patient left  requesting coverage RN CC return call.  Care Conference: No     Universal Utilization:   ED Visits in last year: 9  Hospital visits in last year: 4  Last PCP appointment: 08/30/17  Missed Appointments: 1  Concerns: no  Multiple Providers or Specialists: yes    Clinical Concerns:  Current Medical Concerns:  Pt notes that she has two red blotches on her skin that are new as of today.  Pt was seen in clinic yesterday and was started on Cipro 250 mg BID for 7 days.  No other changes in pt current medication regiment.  Pt denies itching.   Pt does have SOB but this is on going for her but notes no worsening of this symptom.      Agreed to f/u with PCP regarding situation.  Pt was advised to not take her next dose until she hears back from the clinic.     Plan:  Routing to primary care provider and RN triage for additional f/u.    Swathi Brito RN BSN, PHN RN Care Coordinator  [facility]  [email or web address]  [phone]  11/22/2017 11:15 AM

## 2017-11-23 LAB
BACTERIA SPEC CULT: ABNORMAL
SPECIMEN SOURCE: ABNORMAL

## 2017-11-24 ENCOUNTER — CARE COORDINATION (OUTPATIENT)
Dept: CARE COORDINATION | Facility: CLINIC | Age: 82
End: 2017-11-24

## 2017-11-24 DIAGNOSIS — N30.00 ACUTE CYSTITIS WITHOUT HEMATURIA: Primary | ICD-10-CM

## 2017-11-24 DIAGNOSIS — T50.905A ADVERSE EFFECT OF DRUG, INITIAL ENCOUNTER: ICD-10-CM

## 2017-11-24 DIAGNOSIS — E03.9 HYPOTHYROIDISM, UNSPECIFIED TYPE: ICD-10-CM

## 2017-11-24 RX ORDER — LEVOTHYROXINE SODIUM 75 UG/1
TABLET ORAL
Qty: 90 TABLET | Refills: 2 | Status: SHIPPED | OUTPATIENT
Start: 2017-11-24 | End: 2018-08-07

## 2017-11-24 NOTE — PROGRESS NOTES
Clinic Care Coordination Contact  OUTREACH    Referral Information:  Referral Source: Self-patient/Caregiver  Reason for Contact: Patient called RN CC concerned with new skin blotches.    Care Conference: No      Clinical Concerns:  Current Medical Concerns: On 11/22 pt contacted writer with similar concern after having started Cipro for a UTI.  Provider changed prescription to Cefuroxime which pt did  and is taking as directed.     Patient callingcoverage RN CC noting that she has developed new red blotches on her right and left thighs - more than previously noted.   Pt denies itching, fever, chills, body aches.  Notes stomach upset.  Pt denies burning or frequency with urination.  Unsure if urine is cloudy or clear as she has not checked.  Pt notes she is drinking fluids as directed.     Plan:   Routing to primary care provider and RN triage for additional f/u.     Swathi Brito, RN BSN, PHN RN Care Coordinator  [facility]  [email or web address]  [phone]  11/24/2017 10:59 AM

## 2017-11-24 NOTE — PROGRESS NOTES
Clinic Care Coordination Contact  Care Team Conversations    Updated patient on provider recommendation.  Patient states she will come in for the repeat urinalysis tomorrow as she has a family commitment this afternoon that she cannot change.  Patient will take benadryl as needed for the red blotches.       Routing to primary RN CC, Melissa Behl for on going management/follow up.    Swathi Brito RN BSN, PHN RN Care Coordinator  [facility]  [email or web address]  [phone]  11/24/2017 1:10 PM

## 2017-11-24 NOTE — PROGRESS NOTES
Notify patient that she requires a face-to-face encounter so this provider can examine her red blotches rather diagnosing through telephone correspondences.  I also recommend repeating her urinalysis during the requested face-to-face encounter.  For now, stop Cefuroxime.

## 2017-11-24 NOTE — PROGRESS NOTES
Noted. Will monitor urinalysis and will send results and treatment plan to patient via [employer] once available.

## 2017-11-24 NOTE — PROGRESS NOTES
I'll make it easy for Mrs. Spicer.  Come in for a repeat urinalysis.  If still abnormal, then will send another alternative.  In the meantime, take Benadryl as needed for the red blotches.  Orders for urinalysis done. Will send message If I will prescribe another alternative if urinalysis remains abnormal.

## 2017-11-24 NOTE — PROGRESS NOTES
Clinic Care Coordination Contact  Care Team Conversations    Updated patient on provider recommendation.   Pt will stop taking Cefuroxime. Connected patient with [facility]  and remained on the line.  At this time no appointments available at [place] or [facility].  Patient noted she could go to urgent care or possibly [facility] to be seen.   Pee[facility]e was able to lync Dr. Lockett regarding pt need for an appointment as no appointments available today.  Dr. Lockett lynpelon'd  back indicating that he would open a time for patient to be seen today.  [facility] agreed to work with patient on scheduling an appointment.      Swathi Brito, RN BSN, PHN RN Care Coordinator  [facility]  [email or web address]  [phone]  11/24/2017 12:00 PM

## 2017-11-25 ENCOUNTER — OFFICE VISIT (OUTPATIENT)
Dept: URGENT CARE | Facility: URGENT CARE | Age: 82
End: 2017-11-25
Payer: MEDICARE

## 2017-11-25 VITALS
OXYGEN SATURATION: 100 % | HEART RATE: 65 BPM | BODY MASS INDEX: 28.41 KG/M2 | WEIGHT: 160.4 LBS | TEMPERATURE: 97.9 F | SYSTOLIC BLOOD PRESSURE: 145 MMHG | DIASTOLIC BLOOD PRESSURE: 65 MMHG

## 2017-11-25 DIAGNOSIS — R34 DECREASED URINATION: ICD-10-CM

## 2017-11-25 DIAGNOSIS — R30.0 DYSURIA: Primary | ICD-10-CM

## 2017-11-25 DIAGNOSIS — R21 RASH: ICD-10-CM

## 2017-11-25 LAB
ALBUMIN SERPL-MCNC: 3.3 G/DL (ref 3.4–5)
ALBUMIN UR-MCNC: NEGATIVE MG/DL
ALP SERPL-CCNC: 58 U/L (ref 40–150)
ALT SERPL W P-5'-P-CCNC: 32 U/L (ref 0–50)
ANION GAP SERPL CALCULATED.3IONS-SCNC: 7 MMOL/L (ref 3–14)
APPEARANCE UR: CLEAR
AST SERPL W P-5'-P-CCNC: 40 U/L (ref 0–45)
BASOPHILS # BLD AUTO: 0 10E9/L (ref 0–0.2)
BASOPHILS NFR BLD AUTO: 0.2 %
BILIRUB SERPL-MCNC: 0.6 MG/DL (ref 0.2–1.3)
BILIRUB UR QL STRIP: NEGATIVE
BUN SERPL-MCNC: 33 MG/DL (ref 7–30)
CALCIUM SERPL-MCNC: 9.8 MG/DL (ref 8.5–10.1)
CHLORIDE SERPL-SCNC: 99 MMOL/L (ref 94–109)
CO2 SERPL-SCNC: 32 MMOL/L (ref 20–32)
COLOR UR AUTO: YELLOW
CREAT SERPL-MCNC: 1.6 MG/DL (ref 0.52–1.04)
DIFFERENTIAL METHOD BLD: ABNORMAL
EOSINOPHIL # BLD AUTO: 0.1 10E9/L (ref 0–0.7)
EOSINOPHIL NFR BLD AUTO: 2.3 %
ERYTHROCYTE [DISTWIDTH] IN BLOOD BY AUTOMATED COUNT: 12.2 % (ref 10–15)
GFR SERPL CREATININE-BSD FRML MDRD: 31 ML/MIN/1.7M2
GLUCOSE SERPL-MCNC: 86 MG/DL (ref 70–99)
GLUCOSE UR STRIP-MCNC: NEGATIVE MG/DL
HCT VFR BLD AUTO: 34.4 % (ref 35–47)
HGB BLD-MCNC: 11.3 G/DL (ref 11.7–15.7)
HGB UR QL STRIP: NEGATIVE
KETONES UR STRIP-MCNC: NEGATIVE MG/DL
LEUKOCYTE ESTERASE UR QL STRIP: NEGATIVE
LYMPHOCYTES # BLD AUTO: 1.6 10E9/L (ref 0.8–5.3)
LYMPHOCYTES NFR BLD AUTO: 26.9 %
MCH RBC QN AUTO: 33 PG (ref 26.5–33)
MCHC RBC AUTO-ENTMCNC: 32.8 G/DL (ref 31.5–36.5)
MCV RBC AUTO: 101 FL (ref 78–100)
MONOCYTES # BLD AUTO: 0.7 10E9/L (ref 0–1.3)
MONOCYTES NFR BLD AUTO: 10.7 %
NEUTROPHILS # BLD AUTO: 3.6 10E9/L (ref 1.6–8.3)
NEUTROPHILS NFR BLD AUTO: 59.9 %
NITRATE UR QL: NEGATIVE
PH UR STRIP: 6 PH (ref 5–7)
PLATELET # BLD AUTO: 242 10E9/L (ref 150–450)
POTASSIUM SERPL-SCNC: 5 MMOL/L (ref 3.4–5.3)
PROT SERPL-MCNC: 6.9 G/DL (ref 6.8–8.8)
RBC # BLD AUTO: 3.42 10E12/L (ref 3.8–5.2)
SODIUM SERPL-SCNC: 138 MMOL/L (ref 133–144)
SOURCE: NORMAL
SP GR UR STRIP: 1.01 (ref 1–1.03)
UROBILINOGEN UR STRIP-ACNC: 0.2 EU/DL (ref 0.2–1)
WBC # BLD AUTO: 6.1 10E9/L (ref 4–11)

## 2017-11-25 PROCEDURE — 36415 COLL VENOUS BLD VENIPUNCTURE: CPT | Performed by: PHYSICIAN ASSISTANT

## 2017-11-25 PROCEDURE — 99214 OFFICE O/P EST MOD 30 MIN: CPT | Performed by: PHYSICIAN ASSISTANT

## 2017-11-25 PROCEDURE — 85025 COMPLETE CBC W/AUTO DIFF WBC: CPT | Performed by: PHYSICIAN ASSISTANT

## 2017-11-25 PROCEDURE — 81003 URINALYSIS AUTO W/O SCOPE: CPT | Performed by: PHYSICIAN ASSISTANT

## 2017-11-25 PROCEDURE — 80053 COMPREHEN METABOLIC PANEL: CPT | Performed by: PHYSICIAN ASSISTANT

## 2017-11-25 RX ORDER — HYDROCORTISONE 2.5 %
CREAM (GRAM) TOPICAL
Qty: 30 G | Refills: 0 | Status: ON HOLD | OUTPATIENT
Start: 2017-11-25 | End: 2018-11-28

## 2017-11-25 NOTE — MR AVS SNAPSHOT
After Visit Summary   11/25/2017    Linda Spicer    MRN: [id number]           Patient Information     Date Of Birth          6/13/1931        Visit Information        Provider Department      11/25/2017 9:05 AM Antonio Lancaster PA [facility]        Today's Diagnoses     Dysuria    -  1    Decreased urination        Rash          Care Instructions        Dermatitis (Non-Specific)  Dermatitis is an inflammation of the skin. The exact cause of your rash is not certain. However, this rash does not appear to be an infection or contagious illness. Taking care of the rash at home should help relieve your symptoms.  Home Care:    Keep the areas of rash clean by washing it daily. This also helps to keep the skin moist.    Use a neutral pH soap such as Dove or Lever 2000.    Apply a moisturizing lotion after bathing to prevent dry skin.     Avoid skin irritants (wool or silk clothing, grease, oils, some medicines, harsh soaps, and detergents). Wear absorbent, soft fabrics next to the skin rather than rough or scratchy materials.    Unless another medicine was prescribed, you may use Hydrocortisone cream (which you can get without a prescription) to reduce the inflammation.  Follow Up:  Make an appointment with your doctor in the next 1 to 2 weeks if your symptoms do not improve with the above measures.  Get Prompt Medical Attention  if any of the following occur:    Increasing area of redness or pain in the skin    Yellow crusts or drainage from the rash    Joint pain    New rash that appears in other areas of the body    Fever of 100.4 F (38 C) or higher, or as directed by your healthcare provider    [id number] [address], [place] [address]. All rights reserved. This information is not intended as a substitute for professional medical care. Always follow your healthcare professional's instructions.                  Follow-ups after your visit         Follow-up notes from your care team     Return if symptoms worsen or fail to improve.      Your next 10 appointments already scheduled     Dec 08, 2017  9:00 AM CST   Level 2 with BAY 3 INFUSION   [facility]    [address] [facility] [address]   [phone]            Jan 05, 2018  9:30 AM CST   Level 2 with BAY 9 INFUSION   [facility]    [address] [facility] [address]   [phone]            Feb 06, 2018  9:20 AM CST   [facility] Visit with Mario Davenport MD   [facility]    [address] [facility] [id number] [phone]            Mar 12, 2018  2:10 PM CDT   Return Visit with Emeterio Loza MD   [facility])    [address] [place] [address]   [phone]            Apr 04, 2018  4:00 PM CDT   Return Visit with DEVICE CHECK RN CARD   [facility])    [id number] [place] [id number] [phone]              Future tests that were ordered for you today     Open Future Orders        Priority Expected Expires Ordered    *UA reflex to Microscopic Routine  11/24/2018 11/24/2017    IgE Routine  11/24/2018 11/24/2017    CBC with platelets and differential Routine  11/24/2018 11/24/2017            Who to contact     If you have questions or need follow up information about today's clinic visit or your schedule please contact [facility] directly at [phone].  Normal or non-critical lab and imaging results will be communicated to you by MyChart, letter or phone within 4 business days after the clinic has received the results. If you do not hear from us within 7 days, please contact the clinic through MyChart or phone. If you have a critical or abnormal lab result, we will  notify you by phone as soon as possible.  Submit refill requests through [employer] or call your pharmacy and they will forward the refill request to us. Please allow 3 business days for your refill to be completed.          Additional Information About Your Visit        [employer] Information     [employer] gives you secure access to your electronic health record. If you see a primary care provider, you can also send messages to your care team and make appointments. If you have questions, please call your primary care clinic.  If you do not have a primary care provider, please call [phone] and they will assist you.        Care EveryWhere ID     This is your Care EveryWhere ID. This could be used by other organizations to access your [place] medical records  [id number]        Your Vitals Were     Pulse Temperature Pulse Oximetry Breastfeeding? BMI (Body Mass Index)       65 97.9  F (36.6  C) (Oral) 100% No 28.41 kg/m2        Blood Pressure from Last 3 Encounters:   11/25/17 145/65   11/21/17 132/54   11/16/17 116/62    Weight from Last 3 Encounters:   11/25/17 160 lb 6.4 oz (72.8 kg)   11/21/17 160 lb (72.6 kg)   11/16/17 160 lb (72.6 kg)              We Performed the Following     CBC with platelets differential     Comprehensive metabolic panel     UA reflex to Microscopic and Culture          Today's Medication Changes          These changes are accurate as of: 11/25/17 10:46 AM.  If you have any questions, ask your nurse or doctor.               Start taking these medicines.        Dose/Directions    hydrocortisone 2.5 % cream   Used for:  Rash   Started by:  Antonio Lancaster PA        Apply BID to affected region(s) for 7-10 days.   Quantity:  30 g   Refills:  0         These medicines have changed or have updated prescriptions.        Dose/Directions    metoprolol 25 MG 24 hr tablet   Commonly known as:  TOPROL-XL   This may have changed:    - how much to take  - when to take this   Used for:   Chronic atrial fibrillation (H)        Dose:  25 mg   Take 1 tablet (25 mg) by mouth every morning   Quantity:  90 tablet   Refills:  3            Where to get your medicines      These [place] [facility] [place] [address]  [address], [facility] [address]     Phone:  [phone]     hydrocortisone 2.5 % cream                Primary Care Provider Office Phone # Fax #    Tre Lockett [phone]       [address]  [facility] [address]        Equal Access to Services     [facility]: Hadii aad ku hadasho Soomaali, waaxda luqadaha, qaybta kaalmada adeegyada, zahraa petit hayemelina moss . So [facility] [phone].    ATENCIÓN: Si habla español, tiene a merchant disposición servicios gratuitos de asistencia lingüística. [facility] [phone].    We comply with applicable federal civil rights laws and [place] laws. We do not discriminate on the basis of race, color, national origin, age, disability, sex, sexual orientation, or gender identity.            Thank you!     Thank you for choosing [facility]  for your care. Our goal is always to provide you with excellent care. Hearing back from our patients is one way we can continue to improve our services. Please take a few minutes to complete the written survey that you may receive in the mail after your visit with us. Thank you!             Your Updated Medication List - Protect others around you: Learn how to safely use, store and throw away your medicines at www.disposemymeds.org.          This list is accurate as of: 11/25/17 10:46 AM.  Always use your most recent med list.                   Brand Name Dispense Instructions for use Diagnosis    apixaban ANTICOAGULANT 2.5 MG tablet    ELIQUIS    60 tablet    Take 1 tablet (2.5 mg) by mouth 2 times daily    Atrial flutter, paroxysmal (H)       azaTHIOprine 50 MG tablet    IMURAN    90 tablet     Take 1 tablet (50 mg) by mouth daily    Rheumatoid arthritis involving multiple sites with positive rheumatoid factor (H)       bismuth subsalicylate 262 MG Tabs     80 tablet    Take 1 tablet by mouth every 4 hours as needed    Acute infectious diarrhea, Enteritis due to Norovirus       Blood Pressure Monitor Kit     1 kit    1 kit daily as needed    CHF (congestive heart failure) (H)       calcium-vitamin D 600-400 MG-UNIT per tablet    CALTRATE     Take 1 tablet by mouth 2 times daily        cefuroxime 500 MG tablet    CEFTIN    20 tablet    Take 1 tablet (500 mg) by mouth 2 times daily    Acute cystitis without hematuria       cholestyramine 4 G Packet    QUESTRAN    60 each    Take 1 packet (4 g) by mouth 2 times daily (with meals)    Acute diarrhea       ciprofloxacin 250 MG tablet    CIPRO    90 tablet    Take 1 tablet (250 mg) by mouth daily    Chronic UTI       Cranberry 180 MG Caps      Take 1 capsule by mouth daily Unsure of strength        fish oil-omega-3 fatty acids 1000 MG capsule      Take 2 g by mouth daily. 2 capsules daily        FLAGYL PO      Take 500 mg by mouth 2 times daily    Acute infectious diarrhea       folic acid 1 MG tablet    FOLVITE    100 tablet    Take 1 tablet (1 mg) by mouth daily    Oral aphthae       GENTEAL MILD OP      Apply  to eye daily.        hydrocortisone 2.5 % cream     30 g    Apply BID to affected region(s) for 7-10 days.    Rash       IBANdronate 150 MG tablet    BONIVA    1 tablet    Take 1 tablet (150 mg) by mouth every 30 days Take 60 minutes before am meal with 8 oz. water. Remain upright for 30 minutes.    Osteopenia, unspecified location       levothyroxine 75 MCG tablet    SYNTHROID/LEVOTHROID    90 tablet    TAKE ONE TABLET BY MOUTH EVERY DAY    Hypothyroidism, unspecified type       losartan 25 MG tablet    COZAAR    90 tablet    Take 1 tablet (25 mg) by mouth daily (with dinner) Hold if SBP < 110.    Hypertension goal BP (blood pressure) < 150/90       *  LUCENTIS IO      1 injection every 4 Weeks        * ranibizumab 0.3 MG/0.05ML Soln    LUCENTIS     0.3 mg by Intravitreal route        metoprolol 25 MG 24 hr tablet    TOPROL-XL    90 tablet    Take 1 tablet (25 mg) by mouth every morning    Chronic atrial fibrillation (H)       nitroGLYcerin 0.4 MG sublingual tablet    NITROSTAT    25 tablet    Place 1 tablet (0.4 mg) under the tongue every 5 minutes as needed for chest pain    Chest pain       * order for DME     1 Box    Equipment being ordered: Dispense face mask. Mrs. Spicer is immunosuppressed due to rheumatoid arthritis.    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 Use with Albuterol.    Hypoxia       order for DME     1 each    Equipment being ordered: Dispense baffle, for use with nebulizer.    Pneumonia of left upper lobe due to Mycoplasma pneumoniae       * order for DME     1 each    Dispense SP Walker-small    Pain of toe of right foot, Status post bunionectomy       PRESERVISION AREDS PO      Take 2 tablets by mouth daily        raloxifene 60 MG tablet    Evista    30 tablet    Take 1 tablet (60 mg) by mouth daily    Age-related osteoporosis without current pathological fracture       ranitidine 150 MG tablet    ZANTAC    60 tablet    Take 1 tablet (150 mg) by mouth 2 times daily    Gastroesophageal reflux disease without esophagitis       REFRESH P.M. Oint      Apply  to eye. Daily at bedtime        saccharomyces boulardii 250 MG capsule    FLORASTOR     Take 250 mg by mouth        senna-docusate 8.6-50 MG per tablet    SENOKOT-S;PERICOLACE    120 tablet    Take 2 tablets by mouth At Bedtime Hold if diarrhea occurs.    Constipation, chronic       SPIRONOLACTONE PO      Take 12.5 mg by mouth every morning Hold if SBP is less than 120.    Congestive heart failure with preserved LV function, NYHA class 3 (H)       triamcinolone 0.1 % cream     KENALOG    453.6 g    Apply sparingly to affected area on face three times daily.    Facial lesion       ZYRTEC ALLERGY 10 MG tablet   Generic drug:  cetirizine      Take 10 mg by mouth At Bedtime        * Notice:  This list has 5 medication(s) that are the same as other medications prescribed for you. Read the directions carefully, and ask your doctor or other care provider to review them with you.

## 2017-11-25 NOTE — LETTER
November 27, 2017      Linda Spicer  [address]   [facility] [address]        Dear Ms. Spicer,    Your kidney function is a little slower than usual. Please hold your losartan while you are on the antibiotic and drink plenty of fluids.  Resume the losartan once the cefuroxime if completed.      Resulted Orders   UA reflex to Microscopic and Culture   Result Value Ref Range    Color Urine Yellow     Appearance Urine Clear     Glucose Urine Negative NEG^Negative mg/dL    Bilirubin Urine Negative NEG^Negative    Ketones Urine Negative NEG^Negative mg/dL    Specific Gravity Urine 1.010 1.003 - 1.035    Blood Urine Negative NEG^Negative    pH Urine 6.0 5.0 - 7.0 pH    Protein Albumin Urine Negative NEG^Negative mg/dL    Urobilinogen Urine 0.2 0.2 - 1.0 EU/dL    Nitrite Urine Negative NEG^Negative    Leukocyte Esterase Urine Negative NEG^Negative    Source Midstream Urine    CBC with platelets differential   Result Value Ref Range    WBC 6.1 4.0 - 11.0 10e9/L    RBC Count 3.42 (L) 3.8 - 5.2 10e12/L    Hemoglobin 11.3 (L) 11.7 - 15.7 g/dL    Hematocrit 34.4 (L) 35.0 - 47.0 %     (H) 78 - 100 fl    MCH 33.0 26.5 - 33.0 pg    MCHC 32.8 31.5 - 36.5 g/dL    RDW 12.2 10.0 - 15.0 %    Platelet Count 242 150 - 450 10e9/L    Diff Method Automated Method     % Neutrophils 59.9 %    % Lymphocytes 26.9 %    % Monocytes 10.7 %    % Eosinophils 2.3 %    % Basophils 0.2 %    Absolute Neutrophil 3.6 1.6 - 8.3 10e9/L    Absolute Lymphocytes 1.6 0.8 - 5.3 10e9/L    Absolute Monocytes 0.7 0.0 - 1.3 10e9/L    Absolute Eosinophils 0.1 0.0 - 0.7 10e9/L    Absolute Basophils 0.0 0.0 - 0.2 10e9/L   Comprehensive metabolic panel   Result Value Ref Range    Sodium 138 133 - 144 mmol/L      Comment:      Testing performed on Garcia at [facility] lab.      Potassium 5.0 3.4 - 5.3 mmol/L    Chloride 99 94 - 109 mmol/L    Carbon Dioxide 32 20 - 32 mmol/L    Anion Gap 7 3 - 14 mmol/L    Glucose 86 70 - 99  mg/dL    Urea Nitrogen 33 (H) 7 - 30 mg/dL    Creatinine 1.60 (H) 0.52 - 1.04 mg/dL    GFR Estimate 31 (L) >60 mL/min/1.7m2    GFR Estimate If Black 37 (L) >60 mL/min/1.7m2    Calcium 9.8 8.5 - 10.1 mg/dL    Bilirubin Total 0.6 0.2 - 1.3 mg/dL    Albumin 3.3 (L) 3.4 - 5.0 g/dL    Protein Total 6.9 6.8 - 8.8 g/dL    Alkaline Phosphatase 58 40 - 150 U/L    ALT 32 0 - 50 U/L    AST 40 0 - 45 U/L       Please contact the clinic if you have additional questions or if symptoms persist.  Thank you.    Sincerely,      Antonio Lancaster/warren

## 2017-11-25 NOTE — PROGRESS NOTES
MsDriss Dannielle,    Your kidney function is a little slower than usual. Please hold your losartan while you are on the antibiotic and drink plenty of fluids.  Resume the losartan once the cefuroxime if completed.    Please contact the clinic if you have additional questions or if symptoms persist.  Thank you.    Sincerely,    Antonio Lancaster

## 2017-11-25 NOTE — PROGRESS NOTES
SUBJECTIVE:   Linda Spicer is a 86 year old female who presents to clinic today for the following health issues:      URINARY TRACT SYMPTOMS      Duration: since 5 days    Description   Hardly urinating, cloudy    Intensity:  moderate    Accompanying signs and symptoms:  Fever/chills: no   Flank pain no   Nausea and vomiting: no   Vaginal symptoms: cloudy  Abdominal/Pelvic Pain: YES    History  History of frequent UTI's: YES  History of kidney stones: no   Sexually Active: no   Possibility of pregnancy: No    Precipitating or alleviating factors: None    Therapies tried and outcome: Cefuroxime   Outcome: allergic reaction     Additional: Patient was multitasking at home and didn't sit correctly on her make up chiar and slowly tumbled of it onto her rt arm.. Arm ok now.  Happened last night about 10 pm. Denies Injuries.  No head injury.      Patient seen by me 11/21 for UTI symptoms, she had been off her prophlyactic ciproand this was restarted. Next day developed blotches on skin and cipro d/c and switched to cefuroxime- splotches seemed to improve after d/c cipro but seemed to trina up in same spots after starting ceftin, a little itchy, had them all up and down legs.  . cx grew citrobacter resistant to 1st gen cephs and ampicillin, sensitive to all others.     took two days of cipro and 4 pills of ceftin so far.      UA today is clear    Creatinine 50 11/20       Allergies   Allergen Reactions     Cephalexin Hcl Diarrhea     Gabapentin Other (See Comments)     Dizzsiness     Naproxen GI Disturbance     Perfume      Lactase Other (See Comments)     Macrobid [Nitrofurantoin Anhydrous]      Possibly related to lung disease      Sulfa Drugs      Throat swelling     Xanax [Alprazolam] Other (See Comments)     Dizziness        Past Medical History:   Diagnosis Date     Adjustment disorder with anxious mood 5/18/2015     Advanced directives, counseling/discussion 8/30/2012    Patient states has Advance Directive and  will bring in a copy to clinic. 8/30/2012   Tevin May  [facility] Medical Assistant \       Anemia 9/25/2015     Basal cell cancer      CHF (congestive heart failure) (H) 9/18/2014     CKD (chronic kidney disease) stage 3, GFR 30-59 ml/min 9/29/2015     DDD (degenerative disc disease), lumbar 9/25/2015     Diffuse idiopathic pulmonary fibrosis (H) 5/6/2013     Encounter for palliative care 5/18/2015     History of blood transfusion 9/29/2015     Hypertension goal BP (blood pressure) < 140/90 9/7/2012     Hypothyroid 9/7/2012     Irritable bowel syndrome 10/29/2013     Macular degeneration      Macular degeneration, 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     Nondisplaced spiral fracture of shaft of humerus      Osteoporosis 8/13/2013     Imo Update utility     RA (rheumatoid arthritis) (H) 5/7/2013     Rheumatic fever      Shingles      Spinal stenosis of lumbar region with neurogenic claudication 9/14/2015       Patient Active Problem List   Diagnosis     ACP (advance care planning)     Hypertension goal BP (blood pressure) < 150/90     Hypothyroid     Diffuse idiopathic pulmonary fibrosis (H)     Macular degeneration, left eye     Irritable bowel syndrome     Encounter for palliative care     Adjustment disorder with anxious mood     Mild anemia     DDD (degenerative disc disease), lumbar     CKD (chronic kidney disease) stage 3, GFR 30-59 ml/min     History of blood transfusion     Aftercare following surgery     S/P lumbar laminectomy     High risk medication use     Age-related osteoporosis without current pathological fracture     Atrophic vaginitis     Fecal incontinence     Female stress incontinence     Impingement syndrome of both shoulders     UIP (usual interstitial pneumonitis) (H)     High risk medications (not anticoagulants) long-term use     Heart failure with preserved ejection fraction (H)     Congestive heart failure with preserved LV function, NYHA class 3 (H)     Other specified hypothyroidism     Rheumatoid  arthritis involving multiple sites with positive rheumatoid factor (H)     Health Care Home     Sick sinus syndrome (H)     Cardiac pacemaker - Medtronic dual lead pacemaker - Not Dependent - MRI Safe     Immunosuppression (H)         Current Outpatient Prescriptions on File Prior to Visit:  levothyroxine (SYNTHROID/LEVOTHROID) 75 MCG tablet TAKE ONE TABLET BY MOUTH EVERY DAY   losartan (COZAAR) 25 MG tablet Take 1 tablet (25 mg) by mouth daily (with dinner) Hold if SBP < 110.   raloxifene (EVISTA) 60 MG tablet Take 1 tablet (60 mg) by mouth daily   IBANdronate (BONIVA) 150 MG tablet Take 1 tablet (150 mg) by mouth every 30 days Take 60 minutes before am meal with 8 oz. water. Remain upright for 30 minutes.   ranitidine (ZANTAC) 150 MG tablet Take 1 tablet (150 mg) by mouth 2 times daily   azaTHIOprine (IMURAN) 50 MG tablet Take 1 tablet (50 mg) by mouth daily   apixaban ANTICOAGULANT (ELIQUIS) 2.5 MG tablet Take 1 tablet (2.5 mg) by mouth 2 times daily   order for DME Dispense TIESHA Curran   ranibizumab (LUCENTIS) 0.3 MG/0.05ML SOLN 0.3 mg by Intravitreal route   metoprolol (TOPROL-XL) 25 MG 24 hr tablet Take 1 tablet (25 mg) by mouth every morning (Patient taking differently: Take 12.5 mg by mouth 2 times daily )   cholestyramine (QUESTRAN) 4 G Packet Take 1 packet (4 g) by mouth 2 times daily (with meals)   folic acid (FOLVITE) 1 MG tablet Take 1 tablet (1 mg) by mouth daily   bismuth subsalicylate 262 MG TABS Take 1 tablet by mouth every 4 hours as needed   MetroNIDAZOLE (FLAGYL PO) Take 500 mg by mouth 2 times daily    ciprofloxacin (CIPRO) 250 MG tablet Take 1 tablet (250 mg) by mouth daily   SPIRONOLACTONE PO Take 12.5 mg by mouth every morning Hold if SBP is less than 120.   senna-docusate (SENOKOT-S;PERICOLACE) 8.6-50 MG per tablet Take 2 tablets by mouth At Bedtime Hold if diarrhea occurs.   Cranberry 180 MG CAPS Take 1 capsule by mouth daily Unsure of strength   saccharomyces boulardii (FLORASTOR) 250  MG capsule Take 250 mg by mouth   order for DME Equipment being ordered: Dispense baffle, for use with nebulizer.   order for DME Equipment being ordered: Nebulizer. Use with Albuterol.   order for DME Equipment being ordered: Dispense face mask.Mrs. Spicer is immunosuppressed due to rheumatoid arthritis.   triamcinolone (KENALOG) 0.1 % cream Apply sparingly to affected area on face three times daily.   Multiple Vitamins-Minerals (PRESERVISION AREDS PO) Take 2 tablets by mouth daily   Blood Pressure Monitor KIT 1 kit daily as needed   nitroglycerin (NITROSTAT) 0.4 MG SL tablet Place 1 tablet (0.4 mg) under the tongue every 5 minutes as needed for chest pain   Ranibizumab (LUCENTIS IO) 1 injection every 4 Weeks   cetirizine (ZYRTEC ALLERGY) 10 MG tablet Take 10 mg by mouth At Bedtime   Hypromellose (GENTEAL MILD OP) Apply  to eye daily.   Artificial Tear Ointment (REFRESH P.M.) OINT Apply  to eye. Daily at bedtime    calcium-vitamin D (CALTRATE) 600-400 MG-UNIT per tablet Take 1 tablet by mouth 2 times daily    fish oil-omega-3 fatty acids (FISH OIL) 1000 MG capsule Take 2 g by mouth daily. 2 capsules daily    cefuroxime (CEFTIN) 500 MG tablet Take 1 tablet (500 mg) by mouth 2 times daily (Patient not taking: Reported on 11/25/2017)     Current Facility-Administered Medications on File Prior to Visit:  DOBUTamine 500 mg in dextrose 5% 250 mL (adult std)   DOBUTamine 500 mg in dextrose 5% 250 mL (adult std)       Social History   Substance Use Topics     Smoking status: Never Smoker     Smokeless tobacco: Never Used     Alcohol use Yes      Comment: rare wine        Family History   Problem Relation Age of Onset     Hypertension Mother      Psychotic Disorder Father      DIABETES Son      DIABETES Daughter      Blood Disease Daughter        ROS:  General: negative for fever  Resp: negative for chest pain   CV: negative for chest pain  GI: Negative for abd pain.  Neurologic:negative for headache    OBJECTIVE:  /65  (BP Location: Left arm, Patient Position: Chair, Cuff Size: Adult Regular)  Pulse 65  Temp 97.9  F (36.6  C) (Oral)  Wt 160 lb 6.4 oz (72.8 kg)  SpO2 100%  Breastfeeding? No  BMI 28.41 kg/m2   General:   awake, alert, and cooperative.  NAD.   Head: Normocephalic, atraumatic.  Eyes: Conjunctiva clear,   Heart: Regular rate and rhythm. No murmur.  Lungs: Chest is clear; no wheezes or rales.   Neuro: Alert and oriented - normal speech.  SKIN: both anterior thighs with 2-3, macular,  Red round 1.5 to 3 cm nonttp, lesions, no palpable petechiea      CBC mild anemia compared ot prev., platelets wnl    ASSESSMENT:well appearing, ?? Cipro reaction, I am skeptical it is cef rxn.  Given resistances of bacteria, and patient intolerances/allergies, best bet is just complete the cef    ICD-10-CM    1. Dysuria R30.0 UA reflex to Microscopic and Culture   2. Decreased urination R34 CBC with platelets differential     Comprehensive metabolic panel   3. Rash R21 hydrocortisone 2.5 % cream       PLAN: finish cefuroxime  Follow up:  prn  Advised about symptoms which might herald more serious problems.

## 2017-11-25 NOTE — PATIENT INSTRUCTIONS
Dermatitis (Non-Specific)  Dermatitis is an inflammation of the skin. The exact cause of your rash is not certain. However, this rash does not appear to be an infection or contagious illness. Taking care of the rash at home should help relieve your symptoms.  Home Care:    Keep the areas of rash clean by washing it daily. This also helps to keep the skin moist.    Use a neutral pH soap such as Dove or Lever 2000.    Apply a moisturizing lotion after bathing to prevent dry skin.     Avoid skin irritants (wool or silk clothing, grease, oils, some medicines, harsh soaps, and detergents). Wear absorbent, soft fabrics next to the skin rather than rough or scratchy materials.    Unless another medicine was prescribed, you may use Hydrocortisone cream (which you can get without a prescription) to reduce the inflammation.  Follow Up:  Make an appointment with your doctor in the next 1 to 2 weeks if your symptoms do not improve with the above measures.  Get Prompt Medical Attention  if any of the following occur:    Increasing area of redness or pain in the skin    Yellow crusts or drainage from the rash    Joint pain    New rash that appears in other areas of the body    Fever of 100.4 F (38 C) or higher, or as directed by your healthcare provider    [id number] [facility], [address] [place] [address], [place] [address]. All rights reserved. This information is not intended as a substitute for professional medical care. Always follow your healthcare professional's instructions.

## 2017-11-25 NOTE — NURSING NOTE
"Chief Complaint   Patient presents with     UTI       Initial /65 (BP Location: Left arm, Patient Position: Chair, Cuff Size: Adult Regular)  Pulse 65  Temp 97.9  F (36.6  C) (Oral)  Wt 160 lb 6.4 oz (72.8 kg)  SpO2 100%  Breastfeeding? No  BMI 28.41 kg/m2 Estimated body mass index is 28.41 kg/(m^2) as calculated from the following:    Height as of 11/16/17: 5' 3\" (1.6 m).    Weight as of this encounter: 160 lb 6.4 oz (72.8 kg).  Medication Reconciliation: complete     Piper Polanco MA      "

## 2017-11-27 ENCOUNTER — CARE COORDINATION (OUTPATIENT)
Dept: CARE COORDINATION | Facility: CLINIC | Age: 82
End: 2017-11-27

## 2017-11-27 DIAGNOSIS — N30.00 ACUTE CYSTITIS WITHOUT HEMATURIA: ICD-10-CM

## 2017-11-27 DIAGNOSIS — K12.0 ORAL APHTHAE: ICD-10-CM

## 2017-11-27 DIAGNOSIS — T50.905A ADVERSE EFFECT OF DRUG, INITIAL ENCOUNTER: ICD-10-CM

## 2017-11-27 LAB
ALBUMIN UR-MCNC: NEGATIVE MG/DL
APPEARANCE UR: CLEAR
BACTERIA #/AREA URNS HPF: ABNORMAL /HPF
BASOPHILS # BLD AUTO: 0 10E9/L (ref 0–0.2)
BASOPHILS NFR BLD AUTO: 0.3 %
BILIRUB UR QL STRIP: NEGATIVE
COLOR UR AUTO: YELLOW
DIFFERENTIAL METHOD BLD: ABNORMAL
EOSINOPHIL # BLD AUTO: 0.2 10E9/L (ref 0–0.7)
EOSINOPHIL NFR BLD AUTO: 2.1 %
ERYTHROCYTE [DISTWIDTH] IN BLOOD BY AUTOMATED COUNT: 12.5 % (ref 10–15)
GLUCOSE UR STRIP-MCNC: NEGATIVE MG/DL
HCT VFR BLD AUTO: 35.9 % (ref 35–47)
HGB BLD-MCNC: 11.8 G/DL (ref 11.7–15.7)
HGB UR QL STRIP: NEGATIVE
KETONES UR STRIP-MCNC: NEGATIVE MG/DL
LEUKOCYTE ESTERASE UR QL STRIP: ABNORMAL
LYMPHOCYTES # BLD AUTO: 2.1 10E9/L (ref 0.8–5.3)
LYMPHOCYTES NFR BLD AUTO: 28.1 %
MCH RBC QN AUTO: 33.1 PG (ref 26.5–33)
MCHC RBC AUTO-ENTMCNC: 32.9 G/DL (ref 31.5–36.5)
MCV RBC AUTO: 101 FL (ref 78–100)
MONOCYTES # BLD AUTO: 0.7 10E9/L (ref 0–1.3)
MONOCYTES NFR BLD AUTO: 9.9 %
NEUTROPHILS # BLD AUTO: 4.4 10E9/L (ref 1.6–8.3)
NEUTROPHILS NFR BLD AUTO: 59.6 %
NITRATE UR QL: NEGATIVE
NON-SQ EPI CELLS #/AREA URNS LPF: ABNORMAL /LPF
PH UR STRIP: 6 PH (ref 5–7)
PLATELET # BLD AUTO: 271 10E9/L (ref 150–450)
RBC # BLD AUTO: 3.56 10E12/L (ref 3.8–5.2)
RBC #/AREA URNS AUTO: ABNORMAL /HPF
SOURCE: ABNORMAL
SP GR UR STRIP: 1.01 (ref 1–1.03)
UROBILINOGEN UR STRIP-ACNC: 0.2 EU/DL (ref 0.2–1)
WBC # BLD AUTO: 7.3 10E9/L (ref 4–11)
WBC #/AREA URNS AUTO: ABNORMAL /HPF

## 2017-11-27 PROCEDURE — 82785 ASSAY OF IGE: CPT | Performed by: INTERNAL MEDICINE

## 2017-11-27 PROCEDURE — 36415 COLL VENOUS BLD VENIPUNCTURE: CPT | Performed by: INTERNAL MEDICINE

## 2017-11-27 PROCEDURE — 81001 URINALYSIS AUTO W/SCOPE: CPT | Performed by: INTERNAL MEDICINE

## 2017-11-27 PROCEDURE — 85025 COMPLETE CBC W/AUTO DIFF WBC: CPT | Performed by: INTERNAL MEDICINE

## 2017-11-27 RX ORDER — FOLIC ACID 1 MG/1
1 TABLET ORAL DAILY
Qty: 90 TABLET | Refills: 3 | Status: ON HOLD | OUTPATIENT
Start: 2017-11-27 | End: 2018-12-04

## 2017-11-27 NOTE — PROGRESS NOTES
RN CC informed patient of Dr. Lockett's message below.  Patient verbalized understanding and will come to the clinic today for labs.    Patient requesting a refill of folic acid to the [facility].  Will send to the care team for refill request.    Melissa Behl BSN, RN, N  [facility] Care Coordinator  [phone]

## 2017-11-27 NOTE — PROGRESS NOTES
Stop Ceftin. It appears that she could not tolerate any cephalosporins due to diarrhea.  Let's repeat urinalysis today to determine if she even needs any antibiotics. Orders one.

## 2017-11-27 NOTE — PROGRESS NOTES
Clinic Care Coordination Contact    Situation: Patient chart reviewed by care coordinator.    Background: Received update on patient status from covering RN CC, Swathi Brito.    Assessment: Patient being treated for UTI, antibiotic changed to Ceftin on 11/22/17.    Plan/Recommendations: RN CC will outreach to patient in 1 week to f/u on symptoms.    Melissa Behl BSN, RN, PHN  [facility] Care Coordinator  [phone]  [email or web address]

## 2017-11-27 NOTE — PROGRESS NOTES
"Patient reports diarrhea since 11/25/17, occurring several times per hour until mid afternoon Saturday and Sunday.  Patient reports a \"raw\" abdominal pain in her lower abdomen, \"I had it when I went into urgent care on Saturday.\"  Patient states yesterday she only took 1 dose of Ceftin instead of 2 times/day as instructed.  Patient states her diarrhea is less severe today since she did this and inquires if she can take the Ceftin less frequently for a longer period of time due to her diarrhea.  \"I know they told me if I didn't take this medication that I'd have to go into the hospital for IV's.\"  Patient reports nausea, but denies fever, signs of dehydration of blood in her stool.      Please advise.    Melissa Behl BSN, RN, N  [facility] Care Coordinator  [phone]      "

## 2017-11-29 LAB — IGE SERPL-ACNC: 3 KIU/L (ref 0–114)

## 2017-12-01 ENCOUNTER — CARE COORDINATION (OUTPATIENT)
Dept: CARE COORDINATION | Facility: CLINIC | Age: 82
End: 2017-12-01

## 2017-12-01 NOTE — PROGRESS NOTES
Clinic Care Coordination Contact  OUTREACH    Referral Information:  Referral Source: Self-patient/Caregiver  Reason for Contact: RN CC received call back from patient for follow up.  Care Conference: No     Universal Utilization:   ED Visits in last year: 9  Hospital visits in last year: 4  Last PCP appointment: 11/16/17  Missed Appointments: 1  Concerns: no  Multiple Providers or Specialists: yes    Clinical Concerns:  Current Medical Concerns: Patient reports diarrhea has resolved since last contact.  Patient was relieved to hear that her UTI is negative.  Patient inquired if she would be able to delay her next Orencia infusion from 12/8/17 to 12/13/17 due to transportation issues with family, patient's son will now be taking 1 day/week to drive her wherever she needs to go, so she wishes to change her infusions from Fridays to Wednesdays.  RN CC spoke with the [facility] Charge Nurse who informed writer that patient can delay her infusion by 5 days if she needs to, no provider authorization needed.  RN CC updated patient with this information.    Current Behavioral Concerns: n/a    Education Provided to patient: RN CC informed patient of infusion center instructions for delaying her upcoming Orencia infusion and of the MyChart phone number, as patient reports she is unable to access her MyChart.   Clinical Pathway Name: None    Medication Management:  Patient independent in medication management and verbalizes adherence and understanding of medication regimen.    Patient sets up her medications weekly in a medication box and her daughter calls her nightly to ensure she does not forget her evening medications.     Functional Status:  Mobility Status: Independent     Transportation: Patient has [employer] and family also provides transportation as needed.           Psychosocial:  Current living arrangement:: I live in a private home with spouse (Live in a Senior  Apartment)  Financial/Insurance: No concerns.       Resources and Interventions:  Current Resources:  ; Other (see comment) (Heart Failure Action Plan)        Advanced Care Plans/Directives on file:: In process        Goals:   Goal 1 Statement: I will complete my health care directive.  Goal 1 Supportive Steps: Pt has attended a health care directive class, is awaiting to see her  to finalize the document. 10/24/17 Pt informed of CPR vs intubation and information mailed out to patient.  Goal 1 Progression Percent: 0%  Goal 1 Progression Date: 12/01/17              Barriers: multiple health concerns  Strengths: Pt has support from family and is engaged with care coordination.  Patient/Caregiver understanding: Patient verbalized understanding from infusion nurse and will call to change her upcoming appointment from 12/8/17 to 12/13/17.  Frequency of Care Coordination: monthly  Upcoming appointment: 12/08/17 (infusion)     Plan:   Patient will continue to work on her health care directive.  Patient will call the infusion center to reschedule her upcoming infusion.  RN CC will continue to follow patient on a monthly and as needed basis.    Next contact will be in 5 weeks due to writer's upcoming out of office status.    Melissa Behl BSN, RN, N  [facility] Care Coordinator  [phone]

## 2017-12-01 NOTE — PROGRESS NOTES
Clinic Care Coordination Contact  [facility]/Voicemail    Referral Source: Self-patient/Caregiver  Clinical Data: Care Coordinator Outreach  Outreach attempted x 1.  Left message on voicemail with call back information and requested return call.  Plan: Care Coordinator mailed out care coordination introduction letter on 4/6/16. Care Coordinator will try to reach patient again in 5-10 business days.    Melissa Behl BSN, RN, N  [facility] Care Coordinator  [phone]

## 2017-12-02 ENCOUNTER — NURSE TRIAGE (OUTPATIENT)
Dept: NURSING | Facility: CLINIC | Age: 82
End: 2017-12-02

## 2017-12-02 NOTE — TELEPHONE ENCOUNTER
"  Reason for Disposition    [1] Thigh or calf pain AND [2] only 1 side AND [3] present > 1 hour     \"I noticed a pain in my leg leg about a month ago. I thought it was exercising too much. But I haven;t exercised in over a week, but the pain is still there and getting worse. It's tender when I tough it. The pain is in my left leg and goes up into my groin area. \" Denies other sx. Advised to be seen within 4 hrs.    Additional Information    Negative: Looks like a broken bone or dislocated joint (e.g., crooked or deformed)    Negative: Sounds like a life-threatening emergency to the triager    Negative: Followed a leg injury    Negative: Leg swelling is main symptom    Negative: Back pain radiating (shooting) into leg(s)    Negative: Knee pain is main symptom    Negative: Ankle pain is main symptom    Negative: Pregnant    Negative: Chest pain    Negative: Difficulty breathing    Negative: Entire foot is cool or blue in comparison to other side    Negative: Unable to walk    Negative: [1] Red area or streak AND [2] fever    Negative: [1] Swollen joint AND [2] fever    Negative: [1] Cast on leg or ankle AND [2] now increased pain    Negative: Patient sounds very sick or weak to the triager    Negative: [1] SEVERE pain (e.g., excruciating, unable to do any normal activities) AND [2] not improved after 2 hours of pain medicine    Protocols used: LEG PAIN-ADULT-    "

## 2017-12-04 ENCOUNTER — TELEPHONE (OUTPATIENT)
Dept: CARDIOLOGY | Facility: CLINIC | Age: 82
End: 2017-12-04

## 2017-12-04 ENCOUNTER — TRANSFERRED RECORDS (OUTPATIENT)
Dept: HEALTH INFORMATION MANAGEMENT | Facility: CLINIC | Age: 82
End: 2017-12-04

## 2017-12-04 DIAGNOSIS — I48.92 ATRIAL FLUTTER, PAROXYSMAL (H): ICD-10-CM

## 2017-12-04 NOTE — TELEPHONE ENCOUNTER
Linda called to let Dr Loza know that she was recently in [facility] ER    Around 5am this morning the patient with an Afib episode  She was transported to [facility] where they ran test and was discharged with resolved Afib  The episode corrected itself after the patient was at Hospital     The patient does still complain of shaky, weak, and tired    Blood pressure was back to normal 120/70

## 2017-12-05 NOTE — TELEPHONE ENCOUNTER
Message left on Voicemail at 1:10 pm 12/5/2017 requesting a call back from Stacey infante at [phone]  Darla Severin-Brown, LPN

## 2017-12-06 NOTE — TELEPHONE ENCOUNTER
Patient called back. She has been checking her blood pressure and it has been running higher than normal, also she had a nosebleed this morning which concerned her. She states that when the  checked her BP before she went to the hospital on 12/4 her BP was low. Her machine at home had not been getting low readings. She purchased a new unit and has been checking her blood pressure and pulse. Over the last two days her BP has been 161/73, 168/78, and 141/69. Pulse is 60-64. No symptoms. She has an appointment for a follow up with Dr Lockett tomorrow.   Assured her that her BP is not too high. Asked her to check it this afternoon and tomorrow after resting for 20 min and bring readings to Dr Lockett tomorrow. Also discussed having her BP unit checked once a year at the clinic to be sure it is valid. Her nosebleed may just be caused by the sudden cold and dry air. She can try a saline spray.   Also told her I am working on paperwork for her Eliquis from [employer]. Once completed and signed by Dr Lzoa it will be mailed to her for her to complete her part. She understands.       Medication requested: Eliquis  [facility]   Pt's last office visit: 9/11/17  Next scheduled office visit: 3/12/18  Component      Latest Ref Rng & Units 11/25/2017 11/27/2017   WBC      4.0 - 11.0 10e9/L  7.3   RBC Count      3.8 - 5.2 10e12/L  3.56 (L)   Hemoglobin      11.7 - 15.7 g/dL  11.8   Hematocrit      35.0 - 47.0 %  35.9   MCV      78 - 100 fl  101 (H)   MCH      26.5 - 33.0 pg  33.1 (H)   MCHC      31.5 - 36.5 g/dL  32.9   RDW      10.0 - 15.0 %  12.5   Platelet Count      150 - 450 10e9/L  271   Diff Method        Automated Method   % Neutrophils      %  59.6   % Lymphocytes      %  28.1   % Monocytes      %  9.9   % Eosinophils      %  2.1   % Basophils      %  0.3   Absolute Neutrophil      1.6 - 8.3 10e9/L  4.4   Absolute Lymphocytes      0.8 - 5.3  10e9/L  2.1   Absolute Monocytes      0.0 - 1.3 10e9/L  0.7   Absolute Eosinophils      0.0 - 0.7 10e9/L  0.2   Absolute Basophils      0.0 - 0.2 10e9/L  0.0   Sodium      133 - 144 mmol/L 138    Potassium      3.4 - 5.3 mmol/L 5.0    Chloride      94 - 109 mmol/L 99    Carbon Dioxide      20 - 32 mmol/L 32    Anion Gap      3 - 14 mmol/L 7    Glucose      70 - 99 mg/dL 86    Urea Nitrogen      7 - 30 mg/dL 33 (H)    Creatinine      0.52 - 1.04 mg/dL 1.60 (H)    GFR Estimate      >60 mL/min/1.7m2 31 (L)    GFR Estimate If Black      >60 mL/min/1.7m2 37 (L)    Calcium      8.5 - 10.1 mg/dL 9.8    Bilirubin Total      0.2 - 1.3 mg/dL 0.6    Albumin      3.4 - 5.0 g/dL 3.3 (L)    Protein Total      6.8 - 8.8 g/dL 6.9    Alkaline Phosphatase      40 - 150 U/L 58    ALT      0 - 50 U/L 32    AST      0 - 45 U/L 40      Refill authorized per protocol  Stacey Perez RN  Cardiology Care Coordinator  [facility]  Phone: [phone]

## 2017-12-07 ENCOUNTER — OFFICE VISIT (OUTPATIENT)
Dept: FAMILY MEDICINE | Facility: CLINIC | Age: 82
End: 2017-12-07
Payer: MEDICARE

## 2017-12-07 VITALS
BODY MASS INDEX: 28.17 KG/M2 | OXYGEN SATURATION: 94 % | DIASTOLIC BLOOD PRESSURE: 70 MMHG | HEART RATE: 66 BPM | WEIGHT: 159 LBS | TEMPERATURE: 97.9 F | SYSTOLIC BLOOD PRESSURE: 140 MMHG | HEIGHT: 63 IN

## 2017-12-07 DIAGNOSIS — I48.91 ATRIAL FIBRILLATION, UNSPECIFIED TYPE (H): Primary | ICD-10-CM

## 2017-12-07 DIAGNOSIS — E03.8 OTHER SPECIFIED HYPOTHYROIDISM: ICD-10-CM

## 2017-12-07 DIAGNOSIS — N18.30 CKD (CHRONIC KIDNEY DISEASE) STAGE 3, GFR 30-59 ML/MIN (H): ICD-10-CM

## 2017-12-07 DIAGNOSIS — I10 HYPERTENSION GOAL BP (BLOOD PRESSURE) < 150/90: ICD-10-CM

## 2017-12-07 LAB
CREAT SERPL-MCNC: 1.26 MG/DL (ref 0.52–1.04)
GFR SERPL CREATININE-BSD FRML MDRD: 40 ML/MIN/1.7M2
T4 FREE SERPL-MCNC: 1.3 NG/DL (ref 0.76–1.46)
TSH SERPL DL<=0.005 MIU/L-ACNC: 0.89 MU/L (ref 0.4–4)

## 2017-12-07 PROCEDURE — 84443 ASSAY THYROID STIM HORMONE: CPT | Performed by: INTERNAL MEDICINE

## 2017-12-07 PROCEDURE — 82565 ASSAY OF CREATININE: CPT | Performed by: INTERNAL MEDICINE

## 2017-12-07 PROCEDURE — 84439 ASSAY OF FREE THYROXINE: CPT | Performed by: INTERNAL MEDICINE

## 2017-12-07 PROCEDURE — 99214 OFFICE O/P EST MOD 30 MIN: CPT | Performed by: INTERNAL MEDICINE

## 2017-12-07 PROCEDURE — 36415 COLL VENOUS BLD VENIPUNCTURE: CPT | Performed by: INTERNAL MEDICINE

## 2017-12-07 RX ORDER — METOPROLOL TARTRATE 25 MG/1
25 TABLET, FILM COATED ORAL 2 TIMES DAILY
Qty: 60 TABLET | Refills: 5 | Status: SHIPPED | OUTPATIENT
Start: 2017-12-07 | End: 2018-07-25

## 2017-12-07 NOTE — NURSING NOTE
"Chief Complaint   Patient presents with     Hypertension     Heart Problem     follow up heart       Initial /81 (BP Location: Left arm, Patient Position: Chair, Cuff Size: Adult Regular)  Pulse 66  Temp 97.9  F (36.6  C) (Oral)  Ht 5' 3\" (1.6 m)  Wt 159 lb (72.1 kg)  SpO2 94%  BMI 28.17 kg/m2 Estimated body mass index is 28.17 kg/(m^2) as calculated from the following:    Height as of this encounter: 5' 3\" (1.6 m).    Weight as of this encounter: 159 lb (72.1 kg).  Medication Reconciliation: complete     Jorge Forrester MA      "

## 2017-12-07 NOTE — PROGRESS NOTES
SUBJECTIVE:   Linda Spicer is a 86 year old female who presents to clinic today for the following health issues:    Hypertension Follow-up      Outpatient blood pressures are being checked at home.  Results are 120/57.    Low Salt Diet: no added salt    Amount of exercise or physical activity: None    Problems taking medications regularly: No    Medication side effects: none    Diet: low salt      ER follow-up      Duration: 12/4/2017    Description (location/character/radiation): Episodes of rapid atrial fibrillation leading to ER visit.    Intensity:  Moderate (did not require hospitalization)    Accompanying signs and symptoms: No pulmonary edema or stroke-like symptoms.    History (similar episodes/previous evaluation): YES, negative cardiac biomarkers and no infectious disease (despite recent UTI).    Precipitating or alleviating factors: none    Therapies tried and outcome: Toprol XL (but still has breakthrough tachycardia).           Problem list and histories reviewed & adjusted, as indicated.  Additional history: as documented    Patient Active Problem List   Diagnosis     ACP (advance care planning)     Hypertension goal BP (blood pressure) < 150/90     Hypothyroidism     Diffuse idiopathic pulmonary fibrosis (H)     Macular degeneration, left eye     Irritable bowel syndrome     Encounter for palliative care     Adjustment disorder with anxious mood     Mild anemia     DDD (degenerative disc disease), lumbar     CKD (chronic kidney disease) stage 3, GFR 30-59 ml/min     History of blood transfusion     Aftercare following surgery     S/P lumbar laminectomy     High risk medication use     Age-related osteoporosis without current pathological fracture     Atrophic vaginitis     Fecal incontinence     Female stress incontinence     Impingement syndrome of both shoulders     UIP (usual interstitial pneumonitis) (H)     High risk medications (not anticoagulants) long-term use     Heart failure with  preserved ejection fraction (H)     Congestive heart failure with preserved LV function, NYHA class 3 (H)     Other specified hypothyroidism     Rheumatoid arthritis involving multiple sites with positive rheumatoid factor (H)     Health Care Home     Sick sinus syndrome (H)     Cardiac pacemaker - Medtronic dual lead pacemaker - Not Dependent - MRI Safe     Immunosuppression (H)     Past Surgical History:   Procedure Laterality Date     APPENDECTOMY       BIOPSY      hemorrhoidectomy     ENT SURGERY      tonsillectomy     GYN SURGERY      3 D & C's     HYSTERECTOMY, PAP NO LONGER INDICATED       LAMINECTOMY LUMBAR ONE LEVEL N/A 10/13/2015    Procedure: LAMINECTOMY LUMBAR ONE LEVEL;  Surgeon: Fransico Toussaint MD;  Location:  OR       Social History   Substance Use Topics     Smoking status: Never Smoker     Smokeless tobacco: Never Used     Alcohol use Yes      Comment: rare wine      Family History   Problem Relation Age of Onset     Hypertension Mother      Psychotic Disorder Father      DIABETES Son      DIABETES Daughter      Blood Disease Daughter          Current Outpatient Prescriptions   Medication Sig Dispense Refill     metoprolol (LOPRESSOR) 25 MG tablet Take 1 tablet (25 mg) by mouth 2 times daily 60 tablet 5     apixaban ANTICOAGULANT (ELIQUIS) 2.5 MG tablet Take 1 tablet (2.5 mg) by mouth 2 times daily 60 tablet 11     folic acid (FOLVITE) 1 MG tablet Take 1 tablet (1 mg) by mouth daily 90 tablet 3     hydrocortisone 2.5 % cream Apply BID to affected region(s) for 7-10 days. 30 g 0     levothyroxine (SYNTHROID/LEVOTHROID) 75 MCG tablet TAKE ONE TABLET BY MOUTH EVERY DAY 90 tablet 2     losartan (COZAAR) 25 MG tablet Take 1 tablet (25 mg) by mouth daily (with dinner) Hold if SBP < 110. 90 tablet 1     raloxifene (EVISTA) 60 MG tablet Take 1 tablet (60 mg) by mouth daily 30 tablet 5     ranitidine (ZANTAC) 150 MG tablet Take 1 tablet (150 mg) by mouth 2 times daily 60 tablet 5     azaTHIOprine  (IMURAN) 50 MG tablet Take 1 tablet (50 mg) by mouth daily 90 tablet 2     order for DME Dispense SP Walker-small 1 each 0     ranibizumab (LUCENTIS) 0.3 MG/0.05ML SOLN 0.3 mg by Intravitreal route       MetroNIDAZOLE (FLAGYL PO) Take 500 mg by mouth 2 times daily        SPIRONOLACTONE PO Take 12.5 mg by mouth every morning Hold if SBP is less than 120.       senna-docusate (SENOKOT-S;PERICOLACE) 8.6-50 MG per tablet Take 2 tablets by mouth At Bedtime Hold if diarrhea occurs. 120 tablet 11     Cranberry 180 MG CAPS Take 1 capsule by mouth daily Unsure of strength       order for DME Equipment being ordered: Dispense baffle, for use with nebulizer. 1 each 0     order for DME Equipment being ordered: Nebulizer. Use with Albuterol. 1 each 0     order for DME Equipment being ordered: Dispense face mask.  Mrs. Spicer is immunosuppressed due to rheumatoid arthritis. 1 Box 11     triamcinolone (KENALOG) 0.1 % cream Apply sparingly to affected area on face three times daily. 453.6 g 5     Multiple Vitamins-Minerals (PRESERVISION AREDS PO) Take 1 tablet by mouth daily        Blood Pressure Monitor KIT 1 kit daily as needed 1 kit 0     nitroglycerin (NITROSTAT) 0.4 MG SL tablet Place 1 tablet (0.4 mg) under the tongue every 5 minutes as needed for chest pain 25 tablet 0     cetirizine (ZYRTEC ALLERGY) 10 MG tablet Take 10 mg by mouth At Bedtime       Hypromellose (GENTEAL MILD OP) Apply  to eye daily.       Artificial Tear Ointment (REFRESH P.M.) OINT Apply  to eye. Daily at bedtime          calcium-vitamin D (CALTRATE) 600-400 MG-UNIT per tablet Take 1 tablet by mouth 2 times daily        fish oil-omega-3 fatty acids (FISH OIL) 1000 MG capsule Take 2 g by mouth daily. 2 capsules daily            Allergies   Allergen Reactions     Cephalexin Hcl Diarrhea     Gabapentin Other (See Comments)     Dizzsiness     Naproxen GI Disturbance     Perfume      Lactase Other (See Comments)     Macrobid [Nitrofurantoin Anhydrous]       "Possibly related to lung disease      Sulfa Drugs      Throat swelling     Xanax [Alprazolam] Other (See Comments)     Dizziness      Recent Labs   Lab Test  12/07/17   1157  11/25/17   1007  11/10/17   0925  11/01/17   0831  08/30/17   1214   06/03/16   0756   05/22/14   0821   LDL   --    --    --    --   76   --   109*   --   97   HDL   --    --    --    --   50   --   47*   --   42*   TRIG   --    --    --    --   101   --   75   --   84   ALT   --   32  35  33   --    < >  36   < >   --    CR  1.26*  1.60*  1.23*  1.41*   --    < >  1.44*   < >  1.21*   GFRESTIMATED  40*  31*  41*  35*   --    < >  35*   < >  43*   GFRESTBLACK  49*  37*  50*  43*   --    < >  42*   < >  52*   POTASSIUM   --   5.0   --   4.3   --    < >  5.1   < >  4.1   TSH  0.89   --    --    --   0.77   < >   --    < >  0.58    < > = values in this interval not displayed.      BP Readings from Last 3 Encounters:   12/07/17 140/70   11/25/17 145/65   11/21/17 132/54    Wt Readings from Last 3 Encounters:   12/07/17 159 lb (72.1 kg)   11/25/17 160 lb 6.4 oz (72.8 kg)   11/21/17 160 lb (72.6 kg)                  ROS:  C: NEGATIVE for fever, chills, change in weight  I: NEGATIVE for worrisome rashes, moles or lesions  E: NEGATIVE for vision changes or irritation  E/M: NEGATIVE for ear, mouth and throat problems  R: NEGATIVE for significant cough or SOB  CV: NEGATIVE for chest pain, palpitations or peripheral edema  GI: NEGATIVE for nausea, abdominal pain, heartburn, or change in bowel habits  : NEGATIVE for frequency, dysuria, or hematuria  M: NEGATIVE for significant arthralgias or myalgia  N: NEGATIVE for weakness, dizziness or paresthesias  E: NEGATIVE for temperature intolerance, skin/hair changes  H: NEGATIVE for bleeding problems  P: NEGATIVE for changes in mood or affect    OBJECTIVE:     /70  Pulse 66  Temp 97.9  F (36.6  C) (Oral)  Ht 5' 3\" (1.6 m)  Wt 159 lb (72.1 kg)  SpO2 94%  BMI 28.17 kg/m2  Body mass index is 28.17 " kg/(m^2).  GENERAL: healthy, alert and no distress  EYES: Eyes grossly normal to inspection, PERRL and conjunctivae and sclerae normal  HENT: ear canals and TM's normal, nose and mouth without ulcers or lesions  NECK: no adenopathy, no asymmetry, masses, or scars and thyroid normal to palpation  RESP: lungs clear to auscultation - no rales, rhonchi or wheezes  CV: regular rate and rhythm, normal S1 S2, no S3 or S4, no murmur, click or rub, no peripheral edema and peripheral pulses strong  ABDOMEN: soft, nontender, no hepatosplenomegaly, no masses and bowel sounds normal  MS: no gross musculoskeletal defects noted, no edema  SKIN: no suspicious lesions or rashes  NEURO: Normal strength and tone, mentation intact and speech normal  PSYCH: mentation appears normal, affect normal/bright    Diagnostic Test Results:  Results for orders placed or performed in visit on 12/07/17   TSH   Result Value Ref Range    TSH 0.89 0.40 - 4.00 mU/L   Creatinine   Result Value Ref Range    Creatinine 1.26 (H) 0.52 - 1.04 mg/dL    GFR Estimate 40 (L) >60 mL/min/1.7m2    GFR Estimate If Black 49 (L) >60 mL/min/1.7m2   T4 free   Result Value Ref Range    T4 Free 1.30 0.76 - 1.46 ng/dL       ASSESSMENT/PLAN:     (I48.91) Atrial fibrillation, unspecified type (H)  (primary encounter diagnosis)  Comment:   Plan: metoprolol (LOPRESSOR) 25 MG tablet, TSH,         CANCELED: T4 free            (I10) Hypertension goal BP (blood pressure) < 150/90  Comment:   Plan: metoprolol (LOPRESSOR) 25 MG tablet            (E03.8) Other specified hypothyroidism  Comment:   Plan: T4 free            (N18.3) CKD (chronic kidney disease) stage 3, GFR 30-59 ml/min  Comment:   Plan: Creatinine            Follow-up in 2 weeks.      Tre Lockett MD  [facility]

## 2017-12-07 NOTE — PATIENT INSTRUCTIONS
At [facility], we strive to deliver an exceptional experience to you, every time we see you.  If you receive a survey in the mail, please send us back your thoughts. We really do value your feedback.    Based on your medical history, these are the current health maintenance/preventive care services that you are due for (some may have been done at this visit.)  There are no preventive care reminders to display for this patient.      Suggested websites for health information:  Www.[place].org : Up to date and easily searchable information on multiple topics.  Www.medlineplus.gov : medication info, interactive tutorials, watch real surgeries online  Www.familydoctor.org : good info from the Academy of Family Physicians  Www.cdc.gov : public health info, travel advisories, epidemics (H1N1)  Www.aap.org : children's health info, normal development, vaccinations  Www.health.[facility].[place].us : [place] dept of health, public health issues in [place], N1N1    Your care team:                            Family Medicine Internal Medicine   MD Tre Hicks MD Shantel Branch-Fleming, MD Katya Georgiev PA-C Nam Ho, MD Pediatrics   ABY Snyder, KAVEH Phillips APRMYRA CNP   MD Maritza Condon MD Deborah Mielke, MD Kim Thein, APRN CNP      Clinic hours: Monday - Thursday 7 am-7 pm; Fridays 7 am-5 pm.   Urgent care: Monday - Friday 11 am-9 pm; Saturday and Sunday 9 am-5 pm.  Pharmacy : Monday -Thursday 8 am-8 pm; Friday 8 am-6 pm; Saturday and Sunday 9 am-5 pm.     Clinic: [phone]   Pharmacy: [phone]

## 2017-12-07 NOTE — MR AVS SNAPSHOT
After Visit Summary   12/7/2017    Linda Spicer    MRN: [id number]           Patient Information     Date Of Birth          6/13/1931        Visit Information        Provider Department      12/7/2017 11:00 AM Tre Lockett MD [facility]        Today's Diagnoses     Atrial fibrillation, unspecified type (H)    -  1    Hypertension goal BP (blood pressure) < 150/90        Other specified hypothyroidism        Personal history of urinary tract infection        CKD (chronic kidney disease) stage 3, GFR 30-59 ml/min          Care Instructions    At [facility], we strive to deliver an exceptional experience to you, every time we see you.  If you receive a survey in the mail, please send us back your thoughts. We really do value your feedback.    Based on your medical history, these are the current health maintenance/preventive care services that you are due for (some may have been done at this visit.)  There are no preventive care reminders to display for this patient.      Suggested websites for health information:  Www.Jaco Solarsi.AFINOS : Up to date and easily searchable information on multiple topics.  Www.medlineplus.gov : medication info, interactive tutorials, watch real surgeries online  Www.familydoctor.org : good info from the Academy of Family Physicians  Www.cdc.gov : public health info, travel advisories, epidemics (H1N1)  Www.aap.org : children's health info, normal development, vaccinations  Www.health.state.[place].us : [place] dept of health, public health issues in [place], N1N1    Your care team:                            Family Medicine Internal Medicine   MD Tre Hicks MD Shantel Branch-Fleming, MD Katya Georgiev PA-C Nam Ho, MD Pediatrics   ABY Snyder, MD Maritza Tinsley CNP, MD Deborah Mielke, MD Kim Thein, APRN CNP      Clinic hours: Monday - Thursday 7  am-7 pm; Fridays 7 am-5 pm.   Urgent care: Monday - Friday 11 am-9 pm; Saturday and Sunday 9 am-5 pm.  Pharmacy : Monday -Thursday 8 am-8 pm; Friday 8 am-6 pm; Saturday and Sunday 9 am-5 pm.     Clinic: [phone]   Pharmacy: [phone]            Follow-ups after your visit        Your next 10 appointments already scheduled     Dec 13, 2017  2:00 PM CST   Level 2 with BAY 1 INFUSION   [facility]    [address] [place] [address] [id number] [phone]            Jim 10, 2018 10:00 AM CST   Level 2 with BAY 1 INFUSION   [facility] ([facility]    [address] [place] [address] [id number] [phone]            Feb 06, 2018  9:20 AM CST   Return Visit with Mario Davenport MD   [facility] ([facility])    [address] [place] [id number] [phone]            Mar 12, 2018  2:10 PM CDT   Return Visit with Emeterio Loza MD   [facility])    [address] [place] [address] [id number] [phone]            Apr 04, 2018  4:00 PM CDT   Return Visit with DEVICE CHECK RN CARD   [facility])    [address] [place] [address] [id number] [phone]              Who to contact     If you have questions or need follow up information about today's clinic visit or your schedule please contact [facility] directly at [phone].  Normal or non-critical lab and imaging results will be communicated to you by MyChart, letter or phone within 4 business days after the clinic has received the results. If you do not hear from us within 7 days, please contact the clinic through MyChart or phone. If you have a critical or abnormal lab result, we will notify you by phone as soon as possible.  Submit refill requests through [employer] or call  "your pharmacy and they will forward the refill request to us. Please allow 3 business days for your refill to be completed.          Additional Information About Your Visit        [employer] Information     [employer] gives you secure access to your electronic health record. If you see a primary care provider, you can also send messages to your care team and make appointments. If you have questions, please call your primary care clinic.  If you do not have a primary care provider, please call [phone] and they will assist you.        Care EveryWhere ID     This is your Care EveryWhere ID. This could be used by other organizations to access your [facility] medical records  [id number]        Your Vitals Were     Pulse Temperature Height Pulse Oximetry BMI (Body Mass Index)       66 97.9  F (36.6  C) (Oral) 5' 3\" (1.6 m) 94% 28.17 kg/m2        Blood Pressure from Last 3 Encounters:   12/07/17 140/70   11/25/17 145/65   11/21/17 132/54    Weight from Last 3 Encounters:   12/07/17 159 lb (72.1 kg)   11/25/17 160 lb 6.4 oz (72.8 kg)   11/21/17 160 lb (72.6 kg)              We Performed the Following     Creatinine     T4 free     TSH          Today's Medication Changes          These changes are accurate as of: 12/7/17 12:13 PM.  If you have any questions, ask your nurse or doctor.               Start taking these medicines.        Dose/Directions    metoprolol 25 MG tablet   Commonly known as:  LOPRESSOR   Used for:  Atrial fibrillation, unspecified type (H)   Started by:  Tre Lockett MD        Dose:  25 mg   Take 1 tablet (25 mg) by mouth 2 times daily   Quantity:  60 tablet   Refills:  5         Stop taking these medicines if you haven't already. Please contact your care team if you have questions.     metoprolol 25 MG 24 hr tablet   Commonly known as:  TOPROL-XL   Stopped by:  Tre Lockett MD                Where to get your medicines      These medications were sent to [facility] - [place], [place] - [address]  [address], [place] [address]     Phone:  [phone]     metoprolol 25 MG tablet                Primary Care Provider Office Phone # Fax #    Tre Lockett [phone]       [address]  [place] [address]        Equal Access to Services     [facility]: Hadii aad ku hadasho Soomaali, waaxda luqadaha, qaybta kaalmada adeegyada, waxay idiin hayaan adeeg kharash la'aan ah. So [facility] [phone].    ATENCIÓN: Si habevette jessica, tiene a merchant disposición servicios gratuitos de asistencia lingüística. Llame al [phone].    We comply with applicable federal civil rights laws and [place] laws. We do not discriminate on the basis of race, color, national origin, age, disability, sex, sexual orientation, or gender identity.            Thank you!     Thank you for choosing [facility]  for your care. Our goal is always to provide you with excellent care. Hearing back from our patients is one way we can continue to improve our services. Please take a few minutes to complete the written survey that you may receive in the mail after your visit with us. Thank you!             Your Updated Medication List - Protect others around you: Learn how to safely use, store and throw away your medicines at www.disposemymeds.org.          This list is accurate as of: 12/7/17 12:14 PM.  Always use your most recent med list.                   Brand Name Dispense Instructions for use Diagnosis    apixaban ANTICOAGULANT 2.5 MG tablet    ELIQUIS    60 tablet    Take 1 tablet (2.5 mg) by mouth 2 times daily    Atrial flutter, paroxysmal (H)       azaTHIOprine 50 MG tablet    IMURAN    90 tablet    Take 1 tablet (50 mg) by mouth daily    Rheumatoid arthritis involving multiple sites with positive rheumatoid factor (H)       Blood Pressure Monitor Kit     1 kit    1 kit daily as needed    CHF (congestive heart failure) (H)       calcium-vitamin  D 600-400 MG-UNIT per tablet    CALTRATE     Take 1 tablet by mouth 2 times daily        Cranberry 180 MG Caps      Take 1 capsule by mouth daily Unsure of strength        fish oil-omega-3 fatty acids 1000 MG capsule      Take 2 g by mouth daily. 2 capsules daily        FLAGYL PO      Take 500 mg by mouth 2 times daily    Acute infectious diarrhea       folic acid 1 MG tablet    FOLVITE    90 tablet    Take 1 tablet (1 mg) by mouth daily    Oral aphthae       GENTEAL MILD OP      Apply  to eye daily.        hydrocortisone 2.5 % cream     30 g    Apply BID to affected region(s) for 7-10 days.    Rash       levothyroxine 75 MCG tablet    SYNTHROID/LEVOTHROID    90 tablet    TAKE ONE TABLET BY MOUTH EVERY DAY    Hypothyroidism, unspecified type       losartan 25 MG tablet    COZAAR    90 tablet    Take 1 tablet (25 mg) by mouth daily (with dinner) Hold if SBP < 110.    Hypertension goal BP (blood pressure) < 150/90       metoprolol 25 MG tablet    LOPRESSOR    60 tablet    Take 1 tablet (25 mg) by mouth 2 times daily    Atrial fibrillation, unspecified type (H)       nitroGLYcerin 0.4 MG sublingual tablet    NITROSTAT    25 tablet    Place 1 tablet (0.4 mg) under the tongue every 5 minutes as needed for chest pain    Chest pain       * order for DME     1 Box    Equipment being ordered: Dispense face mask. Mrs. Spicer is immunosuppressed due to rheumatoid arthritis.    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 Use with Albuterol.    Hypoxia       order for DME     1 each    Equipment being ordered: Dispense baffle, for use with nebulizer.    Pneumonia of left upper lobe due to Mycoplasma pneumoniae       * order for DME     1 each    Dispense SP Walker-small    Pain of toe of right foot, Status post bunionectomy       PRESERVISION AREDS PO      Take 1 tablet by mouth daily        raloxifene 60 MG tablet    Evista     30 tablet    Take 1 tablet (60 mg) by mouth daily    Age-related osteoporosis without current pathological fracture       ranibizumab 0.3 MG/0.05ML Soln    LUCENTIS     0.3 mg by Intravitreal route        ranitidine 150 MG tablet    ZANTAC    60 tablet    Take 1 tablet (150 mg) by mouth 2 times daily    Gastroesophageal reflux disease without esophagitis       REFRESH P.M. Oint      Apply  to eye. Daily at bedtime        senna-docusate 8.6-50 MG per tablet    SENOKOT-S;PERICOLACE    120 tablet    Take 2 tablets by mouth At Bedtime Hold if diarrhea occurs.    Constipation, chronic       SPIRONOLACTONE PO      Take 12.5 mg by mouth every morning Hold if SBP is less than 120.    Congestive heart failure with preserved LV function, NYHA class 3 (H)       triamcinolone 0.1 % cream    KENALOG    453.6 g    Apply sparingly to affected area on face three times daily.    Facial lesion       ZYRTEC ALLERGY 10 MG tablet   Generic drug:  cetirizine      Take 10 mg by mouth At Bedtime        * Notice:  This list has 3 medication(s) that are the same as other medications prescribed for you. Read the directions carefully, and ask your doctor or other care provider to review them with you.

## 2017-12-11 ENCOUNTER — TELEPHONE (OUTPATIENT)
Dept: CARDIOLOGY | Facility: CLINIC | Age: 82
End: 2017-12-11

## 2017-12-11 NOTE — TELEPHONE ENCOUNTER
Forms completed, then signed by Dr Loza for the patient assistance foundation and patient's Eliquis.     Stacey Perez RN  Cardiology Care Coordinator

## 2017-12-13 ENCOUNTER — INFUSION THERAPY VISIT (OUTPATIENT)
Dept: INFUSION THERAPY | Facility: CLINIC | Age: 82
End: 2017-12-13
Payer: MEDICARE

## 2017-12-13 VITALS
DIASTOLIC BLOOD PRESSURE: 61 MMHG | SYSTOLIC BLOOD PRESSURE: 152 MMHG | OXYGEN SATURATION: 97 % | HEART RATE: 63 BPM | TEMPERATURE: 97.6 F | WEIGHT: 160.8 LBS | BODY MASS INDEX: 28.48 KG/M2

## 2017-12-13 DIAGNOSIS — M05.79 RHEUMATOID ARTHRITIS INVOLVING MULTIPLE SITES WITH POSITIVE RHEUMATOID FACTOR (H): Primary | ICD-10-CM

## 2017-12-13 PROCEDURE — 99207 ZZC NO CHARGE NURSE ONLY: CPT

## 2017-12-13 PROCEDURE — 96365 THER/PROPH/DIAG IV INF INIT: CPT | Performed by: INTERNAL MEDICINE

## 2017-12-13 RX ADMIN — Medication 250 ML: at 14:38

## 2017-12-13 ASSESSMENT — PAIN SCALES - GENERAL: PAINLEVEL: NO PAIN (0)

## 2017-12-13 NOTE — PROGRESS NOTES
"Infusion Nursing Note:  Lindaterell Spicer presents today for Orencia.    Patient seen by provider today: No   present during visit today: Not Applicable.    Note: Pt denies any complaints, see flow sheet for assessment.    Intravenous Access:  Peripheral IV placed.    Treatment Conditions:  Rheumatology Infusion Checklist: PRIOR TO INFUSION OF BIOLOGICAL MEDICATIONS OR ANY OF THESE AS LISTED: Orencia (abatacept) \".rheumbiologicalchecklist\"    Prior to Infusion of biological medications or any of these as listed:    1. Elevated temperature, fever, chills, productive cough or abnormal vital signs, night sweats, coughing up blood or sputum, no appetite or abnormal vital signs : NO    2. Open wounds or new incisions: NO    3. Recent hospitalization: NO    4.  Recent surgeries:  NO    5. Any upcoming surgeries or dental procedures?:NO    6. Any current or recent bouts of illness or infection? On any antibiotics? : NO    7. Any new, sudden or worsening abdominal pain :NO    8. Vaccination within 4 weeks? Patient or someone in the household is scheduled to receive vaccination? No live virus vaccines prior to or during treatment :NO    9. Any nervous system diseases [i.e. multiple sclerosis, Guillain-[place], seizures, neurological  changes]: NO    10. Pregnant or breast feeding; or plans on pregnancy in the future: NO    11. Signs of worsening depression or suicidal ideations while taking benlysta:NO    12. New-onset medical symptoms: NO    13.  New cancer diagnosis or on chemotherapy or radiation NO    14.  Evaluate for any sign of active TB [Unexplained weight loss, Loss of appetite, Night sweats, Fever, Fatigue, Chills, Coughing for 3 weeks or longer, Hemoptysis (coughing up blood), Chest pain]: NO    **Note: If answered yes to any of the above, hold the infusion and contact ordering rheumatologist or on-call rheumatologist.   .        Post Infusion Assessment:  Patient tolerated infusion without incident.  Site " patent and intact, free from redness, edema or discomfort.  Access discontinued per protocol.    Discharge Plan:   Patient discharged in stable condition accompanied by: son.  Departure Mode: Ambulatory.  Pt will RTC 1/10/18 for Orencia.    John Hernandez RN

## 2017-12-13 NOTE — MR AVS SNAPSHOT
After Visit Summary   12/13/2017    Linda Spicer    MRN: [id number]           Patient Information     Date Of Birth          6/13/1931        Visit Information        Provider Department      12/13/2017 2:00 PM BAY 1 INFUSION [facility]        Today's Diagnoses     Rheumatoid arthritis involving multiple sites with positive rheumatoid factor (H)    -  1       Follow-ups after your visit        Your next 10 appointments already scheduled     Jim 10, 2018 10:00 AM CST   Level 2 with BAY 1 INFUSION   [facility]    [address] [place] [id number]   [phone]            Jim 10, 2018  1:00 PM CST   [facility]    [address] [facility] [address]   [phone]            Feb 06, 2018  9:20 AM CST   [facility]    [address] [facility] [address]   [phone]            Feb 07, 2018 10:00 AM CST   Level 2 with Condon 9 INFUSION   [facility]    [address] [place] [id number] [phone]            Mar 12, 2018  2:10 PM CDT   Return Visit with Emeterio Loza MD   [facility]    [address] [place] [id number] [phone]            Apr 04, 2018  4:00 PM CDT   Return Visit with DEVICE CHECK RN CARD   [facility]    [address] [place] [id number] [phone]              Who to contact     If you have questions or need follow up information about today's clinic visit or your schedule please contact [facility] directly at [phone].  Normal or non-critical lab and imaging results will be  communicated to you by [employer], letter or phone within 4 business days after the clinic has received the results. If you do not hear from us within 7 days, please contact the clinic through [employer] or phone. If you have a critical or abnormal lab result, we will notify you by phone as soon as possible.  Submit refill requests through [employer] or call your pharmacy and they will forward the refill request to us. Please allow 3 business days for your refill to be completed.          Additional Information About Your Visit        [employer] Information     [employer] gives you secure access to your electronic health record. If you see a primary care provider, you can also send messages to your care team and make appointments. If you have questions, please call your primary care clinic.  If you do not have a primary care provider, please call [phone] and they will assist you.      [employer] is an electronic gateway that provides easy, online access to your medical records. With [employer], you can request a clinic appointment, read your test results, renew a prescription or communicate with your care team.     To access your existing account, please contact your [facility] or call [phone] for assistance.        Care EveryWhere ID     This is your Care EveryWhere ID. This could be used by other organizations to access your [facility] medical records  [id number]        Your Vitals Were     Pulse Temperature Pulse Oximetry BMI (Body Mass Index)          63 97.6  F (36.4  C) (Oral) 97% 28.48 kg/m2         Blood Pressure from Last 3 Encounters:   12/13/17 152/61   12/07/17 140/70   11/25/17 145/65    Weight from Last 3 Encounters:   12/13/17 72.9 kg (160 lb 12.8 oz)   12/07/17 72.1 kg (159 lb)   11/25/17 72.8 kg (160 lb 6.4 oz)              Today, you had the following     No orders found for display         Today's Medication Changes          These changes are accurate as of: 12/13/17  4:35 PM.   If you have any questions, ask your nurse or doctor.               These medicines have changed or have updated prescriptions.        Dose/Directions    losartan 25 MG tablet   Commonly known as:  COZAAR   This may have changed:    - when to take this  - additional instructions   Used for:  Hypertension goal BP (blood pressure) < 150/90        Dose:  25 mg   Take 1 tablet (25 mg) by mouth daily (with dinner) Hold if SBP < 110.   Quantity:  90 tablet   Refills:  1                Primary Care Provider Office Phone # Fax #    Tre Lockett [phone]       [address]  [facility] [address]        Equal Access to Services     [facility]: Hadii rakesh schuster hadasho Soomaali, waaxda luqadaha, qaybta kaalmada adenicolasyada, zahraa moss . So [facility] [phone].    ATENCIÓN: Si habla español, tiene a merchant disposición servicios gratuitos de asistencia lingüística. [facility] [phone].    We comply with applicable federal civil rights laws and [place] laws. We do not discriminate on the basis of race, color, national origin, age, disability, sex, sexual orientation, or gender identity.            Thank you!     Thank you for choosing [facility]  for your care. Our goal is always to provide you with excellent care. Hearing back from our patients is one way we can continue to improve our services. Please take a few minutes to complete the written survey that you may receive in the mail after your visit with us. Thank you!             Your Updated Medication List - Protect others around you: Learn how to safely use, store and throw away your medicines at www.disposemymeds.org.          This list is accurate as of: 12/13/17  4:35 PM.  Always use your most recent med list.                   Brand Name Dispense Instructions for use Diagnosis    apixaban ANTICOAGULANT 2.5 MG tablet    ELIQUIS    60 tablet    Take 1 tablet (2.5 mg) by mouth 2 times daily     Atrial flutter, paroxysmal (H)       azaTHIOprine 50 MG tablet    IMURAN    90 tablet    Take 1 tablet (50 mg) by mouth daily    Rheumatoid arthritis involving multiple sites with positive rheumatoid factor (H)       Blood Pressure Monitor Kit     1 kit    1 kit daily as needed    CHF (congestive heart failure) (H)       calcium-vitamin D 600-400 MG-UNIT per tablet    CALTRATE     Take 1 tablet by mouth 2 times daily        Cranberry 180 MG Caps      Take 1 capsule by mouth daily Unsure of strength        fish oil-omega-3 fatty acids 1000 MG capsule      Take 2 g by mouth daily. 2 capsules daily        FLAGYL PO      Take 500 mg by mouth 2 times daily    Acute infectious diarrhea       folic acid 1 MG tablet    FOLVITE    90 tablet    Take 1 tablet (1 mg) by mouth daily    Oral aphthae       GENTEAL MILD OP      Apply  to eye daily.        hydrocortisone 2.5 % cream     30 g    Apply BID to affected region(s) for 7-10 days.    Rash       levothyroxine 75 MCG tablet    SYNTHROID/LEVOTHROID    90 tablet    TAKE ONE TABLET BY MOUTH EVERY DAY    Hypothyroidism, unspecified type       losartan 25 MG tablet    COZAAR    90 tablet    Take 1 tablet (25 mg) by mouth daily (with dinner) Hold if SBP < 110.    Hypertension goal BP (blood pressure) < 150/90       metoprolol 25 MG tablet    LOPRESSOR    60 tablet    Take 1 tablet (25 mg) by mouth 2 times daily    Atrial fibrillation, unspecified type (H), Hypertension goal BP (blood pressure) < 150/90       nitroGLYcerin 0.4 MG sublingual tablet    NITROSTAT    25 tablet    Place 1 tablet (0.4 mg) under the tongue every 5 minutes as needed for chest pain    Chest pain       * order for DME     1 Box    Equipment being ordered: Dispense face mask. Mrs. Spicer is immunosuppressed due to rheumatoid arthritis.    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 Use with Albuterol.     Hypoxia       order for DME     1 each    Equipment being ordered: Dispense baffle, for use with nebulizer.    Pneumonia of left upper lobe due to Mycoplasma pneumoniae       * order for DME     1 each    Dispense SP Walker-small    Pain of toe of right foot, Status post bunionectomy       PRESERVISION AREDS PO      Take 1 tablet by mouth daily        raloxifene 60 MG tablet    Evista    30 tablet    Take 1 tablet (60 mg) by mouth daily    Age-related osteoporosis without current pathological fracture       ranibizumab 0.3 MG/0.05ML Soln    LUCENTIS     0.3 mg by Intravitreal route        ranitidine 150 MG tablet    ZANTAC    60 tablet    Take 1 tablet (150 mg) by mouth 2 times daily    Gastroesophageal reflux disease without esophagitis       REFRESH P.M. Oint      Apply  to eye. Daily at bedtime        senna-docusate 8.6-50 MG per tablet    SENOKOT-S;PERICOLACE    120 tablet    Take 2 tablets by mouth At Bedtime Hold if diarrhea occurs.    Constipation, chronic       SPIRONOLACTONE PO      Take 12.5 mg by mouth every morning Hold if SBP is less than 120.    Congestive heart failure with preserved LV function, NYHA class 3 (H)       triamcinolone 0.1 % cream    KENALOG    453.6 g    Apply sparingly to affected area on face three times daily.    Facial lesion       ZYRTEC ALLERGY 10 MG tablet   Generic drug:  cetirizine      Take 10 mg by mouth At Bedtime        * Notice:  This list has 3 medication(s) that are the same as other medications prescribed for you. Read the directions carefully, and ask your doctor or other care provider to review them with you.

## 2017-12-15 ENCOUNTER — CARE COORDINATION (OUTPATIENT)
Dept: CARE COORDINATION | Facility: CLINIC | Age: 82
End: 2017-12-15

## 2017-12-15 NOTE — PROGRESS NOTES
"Patient calls into RN CC to report a concern that she is reacting to Evista.  Patient states she started Evista 12/10/17 and Monday morning woke up at 4:00 am and then could not fall back asleep.  Patient states she then continued to wake up earlier every morning this week and this morning she woke up at 2:40 am and could not fall back asleep.  Patient also reports her stools are most \"pastey\" since changing from Boniva to Evista.  Patient denies any other changes in medications/diet over the past week.    Patient states she is stopping Evista until Dr. Lockett advises if she should resume it and/or of any other recommendations.     Please advise.    Melissa Behl BSN, RN, N  [facility] Care Coordinator  [phone]     "

## 2017-12-15 NOTE — PROGRESS NOTES
Stop Evista. Side effect of insomnia occurs in 6% of patients.  Consult Endocrinology.  Call [phone] to schedule appointment for Endocrinology.

## 2017-12-19 ENCOUNTER — CARE COORDINATION (OUTPATIENT)
Dept: CARE COORDINATION | Facility: CLINIC | Age: 82
End: 2017-12-19

## 2017-12-19 ENCOUNTER — TELEPHONE (OUTPATIENT)
Dept: CARDIOLOGY | Facility: CLINIC | Age: 82
End: 2017-12-19

## 2017-12-19 DIAGNOSIS — I10 HYPERTENSION GOAL BP (BLOOD PRESSURE) < 150/90: ICD-10-CM

## 2017-12-19 NOTE — TELEPHONE ENCOUNTER
Pt left message on voicemail at 12:25pm on 12/19/17 stating she would like a call back from Lakeshia ALBERT regarding her Blood pressure readings. Pt states that she had a  Reading today of 182/86 and has had several readings in the 140's and 160's.  Pt states she has been avoiding salt and exercises and would like some advise.    Please call her at [phone].    Darla Severin-Brown, LPN

## 2017-12-19 NOTE — PROGRESS NOTES
Patient called writer to state endocrinology is booking out until April 2018.  Patient inquires if she needs to be seen sooner than this.    Please advise.    Melissa Behl BSN, RN, N  [facility] Care Coordinator  [phone]

## 2017-12-19 NOTE — TELEPHONE ENCOUNTER
Called and spoke to patient. She has concerns about her BP at home. She got a new BP unit and has been using it for about three weeks. Takes metoprolol 25 mg twice a day, Spironolactone 12.5 mg twice a day and losartan 25 mg in the evening. Her blood pressures are a little lower in the mornings but still run at least 140 and as high as 160. She will occasionally get a reading in the 120's. DBP 60-70's. Pulse in the 60's.   Will route to Dr Loza for recommendations.

## 2017-12-20 ENCOUNTER — CARE COORDINATION (OUTPATIENT)
Dept: CARE COORDINATION | Facility: CLINIC | Age: 82
End: 2017-12-20

## 2017-12-20 DIAGNOSIS — M81.0 AGE-RELATED OSTEOPOROSIS WITHOUT CURRENT PATHOLOGICAL FRACTURE: Primary | ICD-10-CM

## 2017-12-20 RX ORDER — LOSARTAN POTASSIUM 25 MG/1
50 TABLET ORAL
Qty: 90 TABLET | Refills: 1 | COMMUNITY
Start: 2017-12-20 | End: 2018-01-22 | Stop reason: SINTOL

## 2017-12-20 NOTE — TELEPHONE ENCOUNTER
Date: 12/20/2017    Time of Call: 3:25 PM     Diagnosis:  hypertension     [ TORB ] Ordering provider: Dr Emeterio Loza    Order: Increase Losartan to 50 mg daily and take in the morning     Order received by: BRANDI Berger     Follow-up/additional notes: Patient will increase the losartan. She will continue to monitor blood pressure and pulse and call if it goes low or is she is symptomatic. She has a large stock of pills at home and does not want a refill at this time.

## 2017-12-20 NOTE — PROGRESS NOTES
Keep April 2018 appointment.   Osteoporosis is not an urgent medical issue due to absence of any pathological fractures.  Go back to [facility]. Linda still has refills that are valid until November 2018.

## 2017-12-20 NOTE — PROGRESS NOTES
RN CC informed patient of Dr. Lockett's message below and patient verbalized understanding.    Melissa Behl BSN, RN, N  [facility] Care Coordinator  [phone]

## 2017-12-20 NOTE — PROGRESS NOTES
Done.  Inform patient that if she cannot get in earlier than April 2018, then I recommend an outside endocrinologist (Yojana Hurd at [facility]).

## 2017-12-20 NOTE — PROGRESS NOTES
Patient called into writer to state [facility] will not schedule her to be seen for her osteoporosis without orders.    Please advise.    Melissa Behl BSN, RN, N  [facility] Care Coordinator  [phone]

## 2017-12-21 NOTE — PROGRESS NOTES
RN CC informed patient of Dr. Lockett's message below.  Patient verbalized understanding and wrote down the information.    Melissa Behl BSN, RN, N  [facility] Care Coordinator  [phone]

## 2017-12-22 ENCOUNTER — CARE COORDINATION (OUTPATIENT)
Dept: CARE COORDINATION | Facility: CLINIC | Age: 82
End: 2017-12-22

## 2017-12-22 NOTE — PROGRESS NOTES
Patient calls into writer to report her spouse has shingles.  Contact precautions reviewed with patient.  Patient states she has had shingles before, but never a shingles vaccine.  RN CC advised patient to contact her insurance to determine if the vaccine is covered and where she can receive it.  Patient verbalized understanding.    Melissa Behl BSN, RN, N  [facility] Care Coordinator  [phone]

## 2018-01-04 ENCOUNTER — OFFICE VISIT (OUTPATIENT)
Dept: URGENT CARE | Facility: URGENT CARE | Age: 83
End: 2018-01-04
Payer: MEDICARE

## 2018-01-04 VITALS — BODY MASS INDEX: 28.7 KG/M2 | TEMPERATURE: 97.6 F | WEIGHT: 162 LBS | HEART RATE: 62 BPM | OXYGEN SATURATION: 98 %

## 2018-01-04 DIAGNOSIS — N39.0 URINARY TRACT INFECTION WITHOUT HEMATURIA, SITE UNSPECIFIED: Primary | ICD-10-CM

## 2018-01-04 DIAGNOSIS — R39.89 URINARY PROBLEM: ICD-10-CM

## 2018-01-04 DIAGNOSIS — R82.90 NONSPECIFIC FINDING ON EXAMINATION OF URINE: ICD-10-CM

## 2018-01-04 LAB
ALBUMIN UR-MCNC: 30 MG/DL
APPEARANCE UR: ABNORMAL
BACTERIA #/AREA URNS HPF: ABNORMAL /HPF
BILIRUB UR QL STRIP: NEGATIVE
COLOR UR AUTO: YELLOW
GLUCOSE UR STRIP-MCNC: NEGATIVE MG/DL
HGB UR QL STRIP: ABNORMAL
KETONES UR STRIP-MCNC: NEGATIVE MG/DL
LEUKOCYTE ESTERASE UR QL STRIP: ABNORMAL
NITRATE UR QL: NEGATIVE
NON-SQ EPI CELLS #/AREA URNS LPF: ABNORMAL /LPF
PH UR STRIP: 6 PH (ref 5–7)
RBC #/AREA URNS AUTO: ABNORMAL /HPF
SOURCE: ABNORMAL
SP GR UR STRIP: 1.01 (ref 1–1.03)
UROBILINOGEN UR STRIP-ACNC: 0.2 EU/DL (ref 0.2–1)
WBC #/AREA URNS AUTO: >100 /HPF

## 2018-01-04 PROCEDURE — 81001 URINALYSIS AUTO W/SCOPE: CPT | Performed by: PHYSICIAN ASSISTANT

## 2018-01-04 PROCEDURE — 99213 OFFICE O/P EST LOW 20 MIN: CPT | Performed by: PHYSICIAN ASSISTANT

## 2018-01-04 PROCEDURE — 87086 URINE CULTURE/COLONY COUNT: CPT | Performed by: PHYSICIAN ASSISTANT

## 2018-01-04 RX ORDER — LEVOFLOXACIN 250 MG/1
250 TABLET, FILM COATED ORAL DAILY
Qty: 10 TABLET | Refills: 0 | Status: SHIPPED | OUTPATIENT
Start: 2018-01-04 | End: 2018-01-14

## 2018-01-04 NOTE — NURSING NOTE
"Chief Complaint   Patient presents with     UTI     Patient complains of urinary problems       Initial Pulse 62  Temp 97.6  F (36.4  C) (Oral)  Wt 162 lb (73.5 kg)  SpO2 98%  BMI 28.7 kg/m2 Estimated body mass index is 28.7 kg/(m^2) as calculated from the following:    Height as of 12/7/17: 5' 3\" (1.6 m).    Weight as of this encounter: 162 lb (73.5 kg).  Medication Reconciliation: complete       Esther Will    "

## 2018-01-04 NOTE — PROGRESS NOTES
SUBJECTIVE:   Linda Spicer is a 86 year old female presenting with a chief complaint of   Chief Complaint   Patient presents with     UTI     Patient complains of urinary problems   .    Onset of symptoms was 1 day(s) ago.  Course of illness is worsening.    Severity moderate  Current and Associated symptoms: urinary urgency, burning, cloudy urine  Treatment measures tried include None tried.  Predisposing factors include None.        Review of Systems   Constitutional: Negative.    HENT: Negative.    Eyes: Negative.    Respiratory: Negative.    Cardiovascular: Negative.    Gastrointestinal: Negative.    Musculoskeletal: Negative.    Skin: Negative.    Neurological: Negative.    Endo/Heme/Allergies: Negative.    Psychiatric/Behavioral: Negative.          Past Medical History:   Diagnosis Date     Adjustment disorder with anxious mood 5/18/2015     Advanced directives, counseling/discussion 8/30/2012    Patient states has Advance Directive and will bring in a copy to clinic. 8/30/2012   [facility] [place] [place] by [facility] \       Anemia 9/25/2015     Basal cell cancer      CHF (congestive heart failure) (H) 9/18/2014     CKD (chronic kidney disease) stage 3, GFR 30-59 ml/min 9/29/2015     DDD (degenerative disc disease), lumbar 9/25/2015     Diffuse idiopathic pulmonary fibrosis (H) 5/6/2013     Encounter for palliative care 5/18/2015     History of blood transfusion 9/29/2015     Hypertension goal BP (blood pressure) < 140/90 9/7/2012     Hypothyroid 9/7/2012     Irritable bowel syndrome 10/29/2013     Macular degeneration      Macular degeneration, 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 90 tablet 1     metoprolol (LOPRESSOR) 25 MG tablet Take 1 tablet (25 mg) by mouth 2 times daily 60 tablet 5     apixaban ANTICOAGULANT (ELIQUIS) 2.5 MG tablet Take 1 tablet (2.5 mg) by mouth 2 times daily 60 tablet 11     folic acid (FOLVITE) 1 MG tablet Take 1 tablet (1 mg) by mouth daily 90 tablet 3     hydrocortisone 2.5 % cream Apply BID to affected region(s) for 7-10 days. 30 g 0     levothyroxine (SYNTHROID/LEVOTHROID) 75 MCG tablet TAKE ONE TABLET BY MOUTH EVERY DAY 90 tablet 2     ranitidine (ZANTAC) 150 MG tablet Take 1 tablet (150 mg) by mouth 2 times daily 60 tablet 5     azaTHIOprine (IMURAN) 50 MG tablet Take 1 tablet (50 mg) by mouth daily 90 tablet 2     order for DME Dispense SP Walker-small 1 each 0     ranibizumab (LUCENTIS) 0.3 MG/0.05ML SOLN 0.3 mg by Intravitreal route       MetroNIDAZOLE (FLAGYL PO) Take 500 mg by mouth 2 times daily        SPIRONOLACTONE PO Take 12.5 mg by mouth every morning Hold if SBP is less than 120.       senna-docusate (SENOKOT-S;PERICOLACE) 8.6-50 MG per tablet Take 2 tablets by mouth At Bedtime Hold if diarrhea occurs. 120 tablet 11     Cranberry 180 MG CAPS Take 1 capsule by mouth daily Unsure of strength       order for DME Equipment being ordered: Dispense baffle, for use with nebulizer. 1 each 0     order for DME Equipment being ordered: Nebulizer. Use with Albuterol. 1 each 0     order for DME Equipment being ordered: Dispense face mask.  Mrs. Spicer is immunosuppressed due to rheumatoid arthritis. 1 Box 11     triamcinolone (KENALOG) 0.1 % cream Apply sparingly to affected area on face three times daily. 453.6 g 5     Multiple Vitamins-Minerals (PRESERVISION AREDS PO) Take 1 tablet by mouth daily        Blood Pressure Monitor KIT 1 kit daily as needed 1 kit 0      nitroglycerin (NITROSTAT) 0.4 MG SL tablet Place 1 tablet (0.4 mg) under the tongue every 5 minutes as needed for chest pain 25 tablet 0     cetirizine (ZYRTEC ALLERGY) 10 MG tablet Take 10 mg by mouth At Bedtime       Hypromellose (GENTEAL MILD OP) Apply  to eye daily.       Artificial Tear Ointment (REFRESH P.M.) OINT Apply  to eye. Daily at bedtime          calcium-vitamin D (CALTRATE) 600-400 MG-UNIT per tablet Take 1 tablet by mouth 2 times daily        fish oil-omega-3 fatty acids (FISH OIL) 1000 MG capsule Take 2 g by mouth daily. 2 capsules daily            Social History   Substance Use Topics     Smoking status: Never Smoker     Smokeless tobacco: Never Used     Alcohol use Yes      Comment: rare wine        OBJECTIVE  Pulse 62  Temp 97.6  F (36.4  C) (Oral)  Wt 162 lb (73.5 kg)  SpO2 98%  BMI 28.7 kg/m2    Physical Exam   Constitutional: She is oriented to person, place, and time and well-developed, well-nourished, and in no distress.   HENT:   Head: Normocephalic and atraumatic.   Eyes: Conjunctivae and EOM are normal.   Neck: Normal range of motion.   Cardiovascular: Normal rate, regular rhythm and normal heart sounds.    Pulmonary/Chest: Effort normal and breath sounds normal.   Abdominal: Soft. There is tenderness in the suprapubic area. There is no CVA tenderness.   Neurological: She is alert and oriented to person, place, and time. Gait normal.   Skin: Skin is warm and dry.   Psychiatric: Mood and affect normal.       Labs:  Results for orders placed or performed in visit on 01/04/18 (from the past 24 hour(s))   UA with Microscopic reflex to Culture   Result Value Ref Range    Color Urine Yellow     Appearance Urine Slightly Cloudy     Glucose Urine Negative NEG^Negative mg/dL    Bilirubin Urine Negative NEG^Negative    Ketones Urine Negative NEG^Negative mg/dL    Specific Gravity Urine 1.010 1.003 - 1.035    pH Urine 6.0 5.0 - 7.0 pH    Protein Albumin Urine 30 (A) NEG^Negative mg/dL     Urobilinogen Urine 0.2 0.2 - 1.0 EU/dL    Nitrite Urine Negative NEG^Negative    Blood Urine Large (A) NEG^Negative    Leukocyte Esterase Urine Large (A) NEG^Negative    Source Midstream Urine     WBC Urine >100 (A) OTO2^O - 2 /HPF    RBC Urine 10-25 (A) OTO2^O - 2 /HPF    Squamous Epithelial /LPF Urine Few FEW^Few /LPF    Bacteria Urine Few (A) NEG^Negative /HPF           ASSESSMENT:      ICD-10-CM    1. Urinary tract infection without hematuria, site unspecified N39.0 levofloxacin (LEVAQUIN) 250 MG tablet   2. Urinary problem R39.89 UA with Microscopic reflex to Culture   3. Nonspecific finding on examination of urine R82.90 Urine Culture Aerobic Bacterial        Medical Decision Making:    Patient is well known to Dr. Lockett - I discussed treatment with him:  We will start with Levaquin and benadryl, watch closely for allergic reactions, follow up with Dr. Lockett in 10 days (after completing antibiotic course).     PLAN:    UTI Adult:  Levaquin x10 days     Followup:    If not improving or if condition worsens, follow up with your Primary Care Provider    There are no Patient Instructions on file for this visit.    Tawana Hou PA-C

## 2018-01-05 LAB
BACTERIA SPEC CULT: NORMAL
SPECIMEN SOURCE: NORMAL

## 2018-01-05 ASSESSMENT — ENCOUNTER SYMPTOMS
CARDIOVASCULAR NEGATIVE: 1
GASTROINTESTINAL NEGATIVE: 1
RESPIRATORY NEGATIVE: 1
NEUROLOGICAL NEGATIVE: 1
CONSTITUTIONAL NEGATIVE: 1
EYES NEGATIVE: 1
PSYCHIATRIC NEGATIVE: 1
MUSCULOSKELETAL NEGATIVE: 1

## 2018-01-09 ENCOUNTER — TELEPHONE (OUTPATIENT)
Dept: CARDIOLOGY | Facility: CLINIC | Age: 83
End: 2018-01-09

## 2018-01-09 DIAGNOSIS — R10.13 DYSPEPSIA AND DISORDER OF FUNCTION OF STOMACH: Primary | ICD-10-CM

## 2018-01-09 DIAGNOSIS — I50.30 CONGESTIVE HEART FAILURE WITH PRESERVED LV FUNCTION, NYHA CLASS 3 (H): ICD-10-CM

## 2018-01-09 DIAGNOSIS — K31.9 DYSPEPSIA AND DISORDER OF FUNCTION OF STOMACH: Primary | ICD-10-CM

## 2018-01-09 NOTE — TELEPHONE ENCOUNTER
Called and spoke to patient. She said that [facility] told her they called Dr Loza yesterday about refills but she has not heard anything back from them.  Called [facility] and spoke to the pharmacist/. He said that they faxed a refill request for her spironolactone to the [facility] yesterday.       Medication requested: spironolactone  Pharmacy Requested: [facility]    Pt's last office visit: 9/2017  Next scheduled office visit: 3/19/18  Component      Latest Ref Rng & Units 11/25/2017 12/7/2017   Sodium      133 - 144 mmol/L 138    Potassium      3.4 - 5.3 mmol/L 5.0    Chloride      94 - 109 mmol/L 99    Carbon Dioxide      20 - 32 mmol/L 32    Anion Gap      3 - 14 mmol/L 7    Glucose      70 - 99 mg/dL 86    Urea Nitrogen      7 - 30 mg/dL 33 (H)    Creatinine      0.52 - 1.04 mg/dL 1.60 (H) 1.26 (H)   GFR Estimate      >60 mL/min/1.7m2 31 (L) 40 (L)   GFR Estimate If Black      >60 mL/min/1.7m2 37 (L) 49 (L)   Calcium      8.5 - 10.1 mg/dL 9.8    Bilirubin Total      0.2 - 1.3 mg/dL 0.6    Albumin      3.4 - 5.0 g/dL 3.3 (L)    Protein Total      6.8 - 8.8 g/dL 6.9    Alkaline Phosphatase      40 - 150 U/L 58    ALT      0 - 50 U/L 32    AST      0 - 45 U/L 40      Refill authorized per protocol  Stacey Perez RN  Cardiology Care Coordinator  [facility]  Phone: [phone]

## 2018-01-09 NOTE — TELEPHONE ENCOUNTER
Routing refill request to provider for review/approval because:  Drug not active on patient's medication list- stomach relief  Spirolactone not addressed as of yet, this is a reported/historical med.  Jacquelyn Schulz RN

## 2018-01-10 ENCOUNTER — INFUSION THERAPY VISIT (OUTPATIENT)
Dept: INFUSION THERAPY | Facility: CLINIC | Age: 83
End: 2018-01-10
Payer: MEDICARE

## 2018-01-10 VITALS
SYSTOLIC BLOOD PRESSURE: 144 MMHG | RESPIRATION RATE: 18 BRPM | DIASTOLIC BLOOD PRESSURE: 63 MMHG | WEIGHT: 163.2 LBS | HEART RATE: 61 BPM | TEMPERATURE: 97.1 F | OXYGEN SATURATION: 99 % | BODY MASS INDEX: 28.91 KG/M2

## 2018-01-10 DIAGNOSIS — M05.79 RHEUMATOID ARTHRITIS INVOLVING MULTIPLE SITES WITH POSITIVE RHEUMATOID FACTOR (H): Primary | ICD-10-CM

## 2018-01-10 PROCEDURE — 96365 THER/PROPH/DIAG IV INF INIT: CPT | Performed by: INTERNAL MEDICINE

## 2018-01-10 PROCEDURE — 99207 ZZC NO CHARGE LOS: CPT

## 2018-01-10 RX ORDER — SPIRONOLACTONE 25 MG/1
25 TABLET ORAL EVERY MORNING
Qty: 90 TABLET | Refills: 1 | COMMUNITY
Start: 2018-01-09 | End: 2018-01-22 | Stop reason: SINTOL

## 2018-01-10 RX ORDER — BISMUTH SUBSALICYLATE 262 MG/1
524 TABLET, CHEWABLE ORAL 4 TIMES DAILY PRN
Qty: 100 TABLET | Refills: 11 | Status: SHIPPED | OUTPATIENT
Start: 2018-01-10 | End: 2018-08-20

## 2018-01-10 RX ORDER — SPIRONOLACTONE 25 MG/1
12.5 TABLET ORAL EVERY MORNING
Qty: 30 TABLET | OUTPATIENT
Start: 2018-01-10

## 2018-01-10 RX ADMIN — Medication 250 ML: at 10:15

## 2018-01-10 ASSESSMENT — PAIN SCALES - GENERAL: PAINLEVEL: NO PAIN (0)

## 2018-01-10 NOTE — PROGRESS NOTES
"Infusion Nursing Note:  Linda KATEY Orellana presents today for Orencia.    Patient seen by provider today: No   present during visit today: Not Applicable.    Note: N/A.    Intravenous Access:  Peripheral IV placed.    Treatment Conditions:  Rheumatology Infusion Checklist: PRIOR TO INFUSION OF BIOLOGICAL MEDICATIONS OR ANY OF THESE AS LISTED: Orencia (abatacept) \".rheumbiologicalchecklist\"    Prior to Infusion of biological medications or any of these as listed:    1. Elevated temperature, fever, chills, productive cough or abnormal vital signs, night sweats, coughing up blood or sputum, no appetite or abnormal vital signs : NO    2. Open wounds or new incisions: NO    3. Recent hospitalization: NO    4.  Recent surgeries:  NO    5. Any upcoming surgeries or dental procedures?:NO    6. Any current or recent bouts of illness or infection? On any antibiotics? : YES  Symptoms gone.  Completing antibiotics 1/14/18  7. Any new, sudden or worsening abdominal pain :NO    8. Vaccination within 4 weeks? Patient or someone in the household is scheduled to receive vaccination? No live virus vaccines prior to or during treatment :NO    9. Any nervous system diseases [i.e. multiple sclerosis, Guillain-Alden, seizures, neurological  changes]: NO    10. Pregnant or breast feeding; or plans on pregnancy in the future: NO    11. Signs of worsening depression or suicidal ideations while taking benlysta:NO    12. New-onset medical symptoms: NO    13.  New cancer diagnosis or on chemotherapy or radiation NO    14.  Evaluate for any sign of active TB [Unexplained weight loss, Loss of appetite, Night sweats, Fever, Fatigue, Chills, Coughing for 3 weeks or longer, Hemoptysis (coughing up blood), Chest pain]: NO    **Note: If answered yes to any of the above, hold the infusion and contact ordering rheumatologist or on-call rheumatologist.   .        Post Infusion Assessment:  Patient tolerated infusion without " incident.    Discharge Plan:   RTC in 4 wks.  Pt made next 2 appts.    BERENICE BONILLA RN

## 2018-01-10 NOTE — MR AVS SNAPSHOT
After Visit Summary   1/10/2018    Linda Spicer    MRN: [id number]           Patient Information     Date Of Birth          6/13/1931        Visit Information        Provider Department      1/10/2018 10:00 AM BAY 1 INFUSION [facility]        Today's Diagnoses     Rheumatoid arthritis involving multiple sites with positive rheumatoid factor (H)    -  1       Follow-ups after your visit        Your next 10 appointments already scheduled     Jim 15, 2018 11:00 AM CST   MyChart Long with Tre Lockett MD   [facility]    [address] [facility] [address]   [phone]            Jan 16, 2018 10:00 AM CST   Return Visit with Sandra Comer MD   [facility] ([facility])    [address] [place] [address]   [phone]            Feb 06, 2018  9:20 AM CST   Return Visit with Mario Davenport MD   [facility]    [address] [address]   [phone]            Feb 07, 2018 10:00 AM CST   Level 2 with BAY 9 INFUSION   [facility] ([facility])    [address] [place] [address]   [phone]            Mar 07, 2018 10:00 AM CST   Level 2 with BAY 9 INFUSION   [facility] ([facility])    [address] [place] [address]   [phone]            Mar 19, 2018  1:10 PM CDT   Return Visit with Emeterio Loza MD   [facility] ([facility])    [address] [place] [address]   [phone]            Apr 04, 2018  4:00 PM CDT   Return Visit with DEVICE CHECK RN CARD   [facility] ([facility])    [address] [address] [address]   [phone]            Apr 23, 2018 11:00 AM CDT   New  Visit with Tomi Peña MD   [facility] ([facility])    [address] [place] [address] [phone]              Who to contact     If you have questions or need follow up information about today's clinic visit or your schedule please contact [facility] directly at [phone].  Normal or non-critical lab and imaging results will be communicated to you by MyChart, letter or phone within 4 business days after the clinic has received the results. If you do not hear from us within 7 days, please contact the clinic through imageloophart or phone. If you have a critical or abnormal lab result, we will notify you by phone as soon as possible.  Submit refill requests through APS or call your pharmacy and they will forward the refill request to us. Please allow 3 business days for your refill to be completed.          Additional Information About Your Visit        [employer] Information     APS gives you secure access to your electronic health record. If you see a primary care provider, you can also send messages to your care team and make appointments. If you have questions, please call your primary care clinic.  If you do not have a primary care provider, please call [phone] and they will assist you.      APS is an electronic gateway that provides easy, online access to your medical records. With APS, you can request a clinic appointment, read your test results, renew a prescription or communicate with your care team.     To access your existing account, please contact your [facility] or call [phone] for assistance.        Care EveryWhere ID     This is your Care EveryWhere ID. This could be used by other organizations to access your [place] medical records  [id number]        Your Vitals Were     Pulse Temperature Respirations Pulse Oximetry BMI (Body Mass Index)       61 97.1  F (36.2  C)  (Oral) 18 99% 28.91 kg/m2        Blood Pressure from Last 3 Encounters:   01/10/18 144/63   12/13/17 152/61   12/07/17 140/70    Weight from Last 3 Encounters:   01/10/18 74 kg (163 lb 3.2 oz)   01/04/18 73.5 kg (162 lb)   12/13/17 72.9 kg (160 lb 12.8 oz)              Today, you had the following     No orders found for display       Primary Care Provider Office Phone # Fax #    Tre Lockett [phone]       [address]  [facility] [address]        Equal Access to Services     [facility]: Hadii rakesh schuster hadasho Sojordin, waaxda luqadaha, qaybta kaalmada adeegyada, zahraa moss . So [facility] [phone].    ATENCIÓN: Si habla español, tiene a merchant disposición servicios gratuitos de asistencia lingüística. Llame al [phone].    We comply with applicable federal civil rights laws and [place] laws. We do not discriminate on the basis of race, color, national origin, age, disability, sex, sexual orientation, or gender identity.            Thank you!     Thank you for choosing [facility]  for your care. Our goal is always to provide you with excellent care. Hearing back from our patients is one way we can continue to improve our services. Please take a few minutes to complete the written survey that you may receive in the mail after your visit with us. Thank you!             Your Updated Medication List - Protect others around you: Learn how to safely use, store and throw away your medicines at www.disposemymeds.org.          This list is accurate as of: 1/10/18 12:04 PM.  Always use your most recent med list.                   Brand Name Dispense Instructions for use Diagnosis    apixaban ANTICOAGULANT 2.5 MG tablet    ELIQUIS    60 tablet    Take 1 tablet (2.5 mg) by mouth 2 times daily    Atrial flutter, paroxysmal (H)       azaTHIOprine 50 MG tablet    IMURAN    90 tablet    Take 1 tablet (50 mg) by mouth daily    Rheumatoid arthritis  involving multiple sites with positive rheumatoid factor (H)       Blood Pressure Monitor Kit     1 kit    1 kit daily as needed    CHF (congestive heart failure) (H)       calcium-vitamin D 600-400 MG-UNIT per tablet    CALTRATE     Take 1 tablet by mouth 2 times daily        Cranberry 180 MG Caps      Take 1 capsule by mouth daily Unsure of strength        fish oil-omega-3 fatty acids 1000 MG capsule      Take 2 g by mouth daily. 2 capsules daily        FLAGYL PO      Take 500 mg by mouth 2 times daily    Acute infectious diarrhea       folic acid 1 MG tablet    FOLVITE    90 tablet    Take 1 tablet (1 mg) by mouth daily    Oral aphthae       GENTEAL MILD OP      Apply  to eye daily.        hydrocortisone 2.5 % cream     30 g    Apply BID to affected region(s) for 7-10 days.    Rash       levofloxacin 250 MG tablet    LEVAQUIN    10 tablet    Take 1 tablet (250 mg) by mouth daily for 10 days    Urinary tract infection without hematuria, site unspecified       levothyroxine 75 MCG tablet    SYNTHROID/LEVOTHROID    90 tablet    TAKE ONE TABLET BY MOUTH EVERY DAY    Hypothyroidism, unspecified type       losartan 25 MG tablet    COZAAR    90 tablet    Take 2 tablets (50 mg) by mouth daily (with breakfast) Hold if SBP < 110.    Hypertension goal BP (blood pressure) < 150/90       metoprolol 25 MG tablet    LOPRESSOR    60 tablet    Take 1 tablet (25 mg) by mouth 2 times daily    Atrial fibrillation, unspecified type (H), Hypertension goal BP (blood pressure) < 150/90       nitroGLYcerin 0.4 MG sublingual tablet    NITROSTAT    25 tablet    Place 1 tablet (0.4 mg) under the tongue every 5 minutes as needed for chest pain    Chest pain       * order for DME     1 Box    Equipment being ordered: Dispense face mask. Mrs. Spicer is immunosuppressed due to rheumatoid arthritis.    Rheumatoid arthritis involving left wrist with positive rheumatoid factor (H)       * order for DME     1 each    Equipment being ordered:  Nebulizer. Use with Albuterol.    Hypoxia       order for DME     1 each    Equipment being ordered: Dispense baffle, for use with nebulizer.    Pneumonia of left upper lobe due to Mycoplasma pneumoniae       * order for DME     1 each    Dispense SP Walker-small    Pain of toe of right foot, Status post bunionectomy       PRESERVISION AREDS PO      Take 1 tablet by mouth daily        ranibizumab 0.3 MG/0.05ML Soln    LUCENTIS     0.3 mg by Intravitreal route        ranitidine 150 MG tablet    ZANTAC    60 tablet    Take 1 tablet (150 mg) by mouth 2 times daily    Gastroesophageal reflux disease without esophagitis       REFRESH P.M. Oint      Apply  to eye. Daily at bedtime        senna-docusate 8.6-50 MG per tablet    SENOKOT-S;PERICOLACE    120 tablet    Take 2 tablets by mouth At Bedtime Hold if diarrhea occurs.    Constipation, chronic       SPIRONOLACTONE PO      Take 12.5 mg by mouth every morning Hold if SBP is less than 120.    Congestive heart failure with preserved LV function, NYHA class 3 (H)       triamcinolone 0.1 % cream    KENALOG    453.6 g    Apply sparingly to affected area on face three times daily.    Facial lesion       ZYRTEC ALLERGY 10 MG tablet   Generic drug:  cetirizine      Take 10 mg by mouth At Bedtime        * Notice:  This list has 3 medication(s) that are the same as other medications prescribed for you. Read the directions carefully, and ask your doctor or other care provider to review them with you.

## 2018-01-10 NOTE — TELEPHONE ENCOUNTER
Rx for Bismuth subsalicylate, which is identical to Stomach Relief 262 mg chewable tabs, sent to [facility].

## 2018-01-11 ENCOUNTER — ALLIED HEALTH/NURSE VISIT (OUTPATIENT)
Dept: CARDIOLOGY | Facility: CLINIC | Age: 83
End: 2018-01-11
Attending: INTERNAL MEDICINE
Payer: MEDICARE

## 2018-01-11 DIAGNOSIS — I49.5 SICK SINUS SYNDROME (H): Primary | ICD-10-CM

## 2018-01-11 PROCEDURE — 93294 REM INTERROG EVL PM/LDLS PM: CPT | Performed by: INTERNAL MEDICINE

## 2018-01-11 PROCEDURE — 93296 REM INTERROG EVL PM/IDS: CPT | Mod: ZF

## 2018-01-11 NOTE — MR AVS SNAPSHOT
After Visit Summary   1/11/2018    Linda Spicer    MRN: [id number]           Patient Information     Date Of Birth          6/13/1931        Visit Information        Provider Department      1/11/2018 6:00 AM  ICD REMOTE [facility]        Today's Diagnoses     Sick sinus syndrome (H)    -  1       Follow-ups after your visit        Your next 10 appointments already scheduled     Jim 15, 2018 11:00 AM CST   MyChart Long with Tre Lockett MD   [facility] ([facility])    [address] [facility] [address]   [phone]            Jan 16, 2018 10:00 AM CST   Return Visit with Sandra Comer MD   [facility] ([facility])    [address] [place] [address]   [phone]            Feb 06, 2018  9:20 AM CST   Return Visit with Mario Davenport MD   [facility] ([facility])    [address] [facility] [address]   [phone]            Feb 07, 2018 10:00 AM CST   Level 2 with BAY 9 INFUSION   [facility] ([facility])    [address] [place] [address]   [phone]            Mar 07, 2018 10:00 AM CST   Level 2 with BAY 9 INFUSION   [facility] ([facility])    [address] [place] [address]   [phone]            Mar 19, 2018  1:10 PM CDT   Return Visit with Emeterio Loza MD   [facility] ([facility])    [address] [place] [address]   [phone]            Apr 04, 2018  4:00 PM CDT   Return Visit with DEVICE CHECK RN CARD   [facility] ([facility])    [address] [place] [address]   [phone]            Apr 23, 2018 11:00 AM CDT   New Visit with Tomi Peña MD   [facility]  ([facility])    [address] [place] [address] [phone]              Who to contact     If you have questions or need follow up information about today's clinic visit or your schedule please contact [facility] directly at [phone].  Normal or non-critical lab and imaging results will be communicated to you by MyChart, letter or phone within 4 business days after the clinic has received the results. If you do not hear from us within 7 days, please contact the clinic through MyChart or phone. If you have a critical or abnormal lab result, we will notify you by phone as soon as possible.  Submit refill requests through [employer] or call your pharmacy and they will forward the refill request to us. Please allow 3 business days for your refill to be completed.          Additional Information About Your Visit        MyChart Information     [employer] gives you secure access to your electronic health record. If you see a primary care provider, you can also send messages to your care team and make appointments. If you have questions, please call your primary care clinic.  If you do not have a primary care provider, please call [phone] and they will assist you.        Care EveryWhere ID     This is your Care EveryWhere ID. This could be used by other organizations to access your [place] medical records  [id number]         Blood Pressure from Last 3 Encounters:   01/10/18 144/63   12/13/17 152/61   12/07/17 140/70    Weight from Last 3 Encounters:   01/10/18 74 kg (163 lb 3.2 oz)   01/04/18 73.5 kg (162 lb)   12/13/17 72.9 kg (160 lb 12.8 oz)              We Performed the Following     (07749) INTERROGATION DEVICE EVAL REMOTE, PACER/ICD        Primary Care Provider Office Phone # Fax #    Tre Lockett [phone] [address]  [facility] [address]        Equal Access to Services     [facility] AH: zofia Harrell  leena quinngigifidencio harriszahraa oates ah. So [facility] [phone].    ATENCIÓN: Si rik jessica, tiene a merchant disposición servicios gratuitos de asistencia lingüística. Alin al [phone].    We comply with applicable federal civil rights laws and [place] laws. We do not discriminate on the basis of race, color, national origin, age, disability, sex, sexual orientation, or gender identity.            Thank you!     Thank you for choosing [facility]  for your care. Our goal is always to provide you with excellent care. Hearing back from our patients is one way we can continue to improve our services. Please take a few minutes to complete the written survey that you may receive in the mail after your visit with us. Thank you!             Your Updated Medication List - Protect others around you: Learn how to safely use, store and throw away your medicines at www.disposemymeds.org.          This list is accurate as of: 1/11/18 11:59 PM.  Always use your most recent med list.                   Brand Name Dispense Instructions for use Diagnosis    apixaban ANTICOAGULANT 2.5 MG tablet    ELIQUIS    60 tablet    Take 1 tablet (2.5 mg) by mouth 2 times daily    Atrial flutter, paroxysmal (H)       azaTHIOprine 50 MG tablet    IMURAN    90 tablet    Take 1 tablet (50 mg) by mouth daily    Rheumatoid arthritis involving multiple sites with positive rheumatoid factor (H)       bismuth subsalicylate 262 MG chewable tablet    PEPTO BISMOL    100 tablet    Take 2 tablets (524 mg) by mouth 4 times daily as needed    Dyspepsia and disorder of function of stomach       Blood Pressure Monitor Kit     1 kit    1 kit daily as needed    CHF (congestive heart failure) (H)       calcium-vitamin D 600-400 MG-UNIT per tablet    CALTRATE     Take 1 tablet by mouth 2 times daily        Cranberry 180 MG Caps      Take 1 capsule by mouth daily Unsure of strength        fish oil-omega-3 fatty acids  1000 MG capsule      Take 2 g by mouth daily. 2 capsules daily        FLAGYL PO      Take 500 mg by mouth 2 times daily    Acute infectious diarrhea       folic acid 1 MG tablet    FOLVITE    90 tablet    Take 1 tablet (1 mg) by mouth daily    Oral aphthae       GENTEAL MILD OP      Apply  to eye daily.        hydrocortisone 2.5 % cream     30 g    Apply BID to affected region(s) for 7-10 days.    Rash       levofloxacin 250 MG tablet    LEVAQUIN    10 tablet    Take 1 tablet (250 mg) by mouth daily for 10 days    Urinary tract infection without hematuria, site unspecified       levothyroxine 75 MCG tablet    SYNTHROID/LEVOTHROID    90 tablet    TAKE ONE TABLET BY MOUTH EVERY DAY    Hypothyroidism, unspecified type       losartan 25 MG tablet    COZAAR    90 tablet    Take 2 tablets (50 mg) by mouth daily (with breakfast) Hold if SBP < 110.    Hypertension goal BP (blood pressure) < 150/90       metoprolol tartrate 25 MG tablet    LOPRESSOR    60 tablet    Take 1 tablet (25 mg) by mouth 2 times daily    Atrial fibrillation, unspecified type (H), Hypertension goal BP (blood pressure) < 150/90       nitroGLYcerin 0.4 MG sublingual tablet    NITROSTAT    25 tablet    Place 1 tablet (0.4 mg) under the tongue every 5 minutes as needed for chest pain    Chest pain       * order for DME     1 Box    Equipment being ordered: Dispense face mask. Mrs. Spicer is immunosuppressed due to rheumatoid arthritis.    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 Use with Albuterol.    Hypoxia       order for DME     1 each    Equipment being ordered: Dispense baffle, for use with nebulizer.    Pneumonia of left upper lobe due to Mycoplasma pneumoniae       * order for DME     1 each    Dispense SP Walker-small    Pain of toe of right foot, Status post bunionectomy       PRESERVISION AREDS PO      Take 1 tablet by mouth daily         ranibizumab 0.3 MG/0.05ML Soln    LUCENTIS     0.3 mg by Intravitreal route        ranitidine 150 MG tablet    ZANTAC    60 tablet    Take 1 tablet (150 mg) by mouth 2 times daily    Gastroesophageal reflux disease without esophagitis       REFRESH P.M. Oint      Apply  to eye. Daily at bedtime        senna-docusate 8.6-50 MG per tablet    SENOKOT-S;PERICOLACE    120 tablet    Take 2 tablets by mouth At Bedtime Hold if diarrhea occurs.    Constipation, chronic       spironolactone 25 MG tablet    ALDACTONE    90 tablet    Take 1 tablet (25 mg) by mouth every morning Hold if SBP is less than 120.    Congestive heart failure with preserved LV function, NYHA class 3 (H)       triamcinolone 0.1 % cream    KENALOG    453.6 g    Apply sparingly to affected area on face three times daily.    Facial lesion       ZYRTEC ALLERGY 10 MG tablet   Generic drug:  cetirizine      Take 10 mg by mouth At Bedtime        * Notice:  This list has 3 medication(s) that are the same as other medications prescribed for you. Read the directions carefully, and ask your doctor or other care provider to review them with you.

## 2018-01-12 NOTE — PROGRESS NOTES
Scheduled [employer] remote pacemaker transmission received and reviewed. Device transmission sent per MD orders. Her presenting rhythm is AS/ VS @ 75 bpm. AF burden= 0.7%. Pt states she is taking eliquis. Normal device function. AP= 73% and = 0.7%. No short V-V intervals recorded. Lead trends appear stable. Battery estimates 8.5 years to ZENAIDA. Pt notified of transmission results. Plan for pt to RTC in 3 months as scheduled.  Remote pacemaker transmission

## 2018-01-15 ENCOUNTER — TRANSFERRED RECORDS (OUTPATIENT)
Dept: HEALTH INFORMATION MANAGEMENT | Facility: CLINIC | Age: 83
End: 2018-01-15

## 2018-01-15 LAB
CREAT SERPL-MCNC: 1.59 MG/DL (ref 0.55–1.02)
GFR SERPL CREATININE-BSD FRML MDRD: 31 ML/MIN/1.73M2
GLUCOSE SERPL-MCNC: 85 MG/DL (ref 74–106)
POTASSIUM SERPL-SCNC: 4.8 MMOL/L (ref 3.5–5.1)

## 2018-01-16 ENCOUNTER — OFFICE VISIT (OUTPATIENT)
Dept: PULMONOLOGY | Facility: CLINIC | Age: 83
End: 2018-01-16
Payer: MEDICARE

## 2018-01-16 VITALS
SYSTOLIC BLOOD PRESSURE: 109 MMHG | HEART RATE: 74 BPM | WEIGHT: 165.1 LBS | DIASTOLIC BLOOD PRESSURE: 60 MMHG | RESPIRATION RATE: 20 BRPM | OXYGEN SATURATION: 96 % | BODY MASS INDEX: 28.19 KG/M2 | TEMPERATURE: 97.1 F | HEIGHT: 64 IN

## 2018-01-16 DIAGNOSIS — M05.79 RHEUMATOID ARTHRITIS INVOLVING MULTIPLE SITES WITH POSITIVE RHEUMATOID FACTOR (H): ICD-10-CM

## 2018-01-16 DIAGNOSIS — J84.9 ILD (INTERSTITIAL LUNG DISEASE) (H): Primary | ICD-10-CM

## 2018-01-16 PROCEDURE — 99214 OFFICE O/P EST MOD 30 MIN: CPT | Performed by: INTERNAL MEDICINE

## 2018-01-16 ASSESSMENT — PAIN SCALES - GENERAL: PAINLEVEL: NO PAIN (0)

## 2018-01-16 NOTE — PATIENT INSTRUCTIONS
Remember to practice slower breathing when you are walking, use walker for now.    Schedule breathing tests

## 2018-01-16 NOTE — NURSING NOTE
"Linda Spicer's goals for this visit include: Return  She requests these members of her care team be copied on today's visit information:     PCP: Tre Lockett    Referring Provider:  ESTABLISHED PATIENT  No address on file    Chief Complaint   Patient presents with     RECHECK       Initial /60 (BP Location: Left arm, Patient Position: Sitting, Cuff Size: Adult Large)  Pulse 74  Temp 97.1  F (36.2  C)  Resp 20  Ht 5' 4\" (1.626 m)  Wt 165 lb 1.6 oz (74.9 kg)  SpO2 96%  BMI 28.34 kg/m2 Estimated body mass index is 28.34 kg/(m^2) as calculated from the following:    Height as of this encounter: 5' 4\" (1.626 m).    Weight as of this encounter: 165 lb 1.6 oz (74.9 kg).  Medication Reconciliation: complete     Medications Refills SMA Tal  "

## 2018-01-16 NOTE — MR AVS SNAPSHOT
After Visit Summary   1/16/2018    Linda Spicer    MRN: [id number]           Patient Information     Date Of Birth          6/13/1931        Visit Information        Provider Department      1/16/2018 10:00 AM Sandra Comer MD [facility]        Today's Diagnoses     ILD (interstitial lung disease) (H)    -  1    Rheumatoid arthritis involving multiple sites with positive rheumatoid factor (H)          Care Instructions    Remember to practice slower breathing when you are walking, use walker for now.    Schedule breathing tests          Follow-ups after your visit        Follow-up notes from your care team     Return in about 1 year (around 1/16/2019).      Your next 10 appointments already scheduled     Feb 06, 2018  9:20 AM CST   Return Visit with Mario Davenport MD   [facility] ([facility])    [address] [place] [id number]   [phone]            Feb 07, 2018 10:00 AM CST   Level 2 with [address])    [address] [place] [address]   [phone]            Feb 14, 2018  2:30 PM CST   Office Visit with PFT LAB   [facility])    [address] [place] [id number]   [phone]           Bring a current list of meds and any records pertaining to this visit. For Physicals, please bring immunization records and any forms needing to be filled out. Please arrive 10 minutes early to complete paperwork.            Mar 07, 2018 10:00 AM CST   Level 2 with [address] ([facility])    [id number] [place] [id number]   [phone]            Mar 19, 2018  1:10 PM CDT   Return Visit with Emeterio Loza MD   [facility])    [address] [place] [address]  [address]   [phone]            Apr 04, 2018  4:00 PM CDT   Return Visit with DEVICE CHECK RN CARD   [facility] ([facility])    [address] [place] [address]   [phone]            Apr 23, 2018 11:00 AM CDT   New Visit with Tomi Peña MD   [facility] ([facility])    [address] [place] [address]   [phone]            Jim 15, 2019  9:30 AM CST   Return Visit with Sandra Comer MD   [facility] ([facility])    [address] [place] [address]   [phone]              Future tests that were ordered for you today     Open Future Orders        Priority Expected Expires Ordered    General PFT Lab (Please always keep checked) Routine 1/16/2018 1/16/2019 1/16/2018    Pulmonary Function Test Routine  1/16/2019 1/16/2018    6 minute walk test Routine  1/16/2019 1/16/2018            Who to contact     If you have questions or need follow up information about today's clinic visit or your schedule please contact [facility] directly at [phone].  Normal or non-critical lab and imaging results will be communicated to you by MyChart, letter or phone within 4 business days after the clinic has received the results. If you do not hear from us within 7 days, please contact the clinic through MyChart or phone. If you have a critical or abnormal lab result, we will notify you by phone as soon as possible.  Submit refill requests through [employer] or call your pharmacy and they will forward the refill request to us. Please allow 3 business days for your refill to be completed.          Additional Information About Your Visit        [employer] Information     [employer] gives you secure access to your electronic health record. If you see a primary care provider, you can also send messages to your care team and make appointments. If you  "have questions, please call your primary care clinic.  If you do not have a primary care provider, please call [phone] and they will assist you.      [email or web address] is an electronic gateway that provides easy, online access to your medical records. With [email or web address], you can request a clinic appointment, read your test results, renew a prescription or communicate with your care team.     To access your existing account, please contact your [facility] or call [phone] for assistance.        Care EveryWhere ID     This is your Care EveryWhere ID. This could be used by other organizations to access your [place] medical records  [id number]        Your Vitals Were     Pulse Temperature Respirations Height Pulse Oximetry BMI (Body Mass Index)    74 97.1  F (36.2  C) 20 1.626 m (5' 4\") 96% 28.34 kg/m2       Blood Pressure from Last 3 Encounters:   01/16/18 109/60   01/10/18 144/63   12/13/17 152/61    Weight from Last 3 Encounters:   01/16/18 74.9 kg (165 lb 1.6 oz)   01/10/18 74 kg (163 lb 3.2 oz)   01/04/18 73.5 kg (162 lb)               Primary Care Provider Office Phone # Fax #    Tre Lockett [phone]       [address] [place]  [facility] [address]        Equal Access to Services     [facility] : Hadii rakesh ku hadasho Soomaali, waaxda luqadaha, qaybta kaalmada juventinoyada, zahraa moss . So [facility] [phone].    ATENCIÓN: Si habla español, tiene a merchant disposición servicios gratuitos de asistencia lingüística. Alin al [phone].    We comply with applicable federal civil rights laws and [place] laws. We do not discriminate on the basis of race, color, national origin, age, disability, sex, sexual orientation, or gender identity.            Thank you!     Thank you for choosing [facility]  for your care. Our goal is always to provide you with excellent care. Hearing back from our patients is one way we can " continue to improve our services. Please take a few minutes to complete the written survey that you may receive in the mail after your visit with us. Thank you!             Your Updated Medication List - Protect others around you: Learn how to safely use, store and throw away your medicines at www.disposemymeds.org.          This list is accurate as of: 1/16/18 10:57 AM.  Always use your most recent med list.                   Brand Name Dispense Instructions for use Diagnosis    apixaban ANTICOAGULANT 2.5 MG tablet    ELIQUIS    60 tablet    Take 1 tablet (2.5 mg) by mouth 2 times daily    Atrial flutter, paroxysmal (H)       azaTHIOprine 50 MG tablet    IMURAN    90 tablet    Take 1 tablet (50 mg) by mouth daily    Rheumatoid arthritis involving multiple sites with positive rheumatoid factor (H)       bismuth subsalicylate 262 MG chewable tablet    PEPTO BISMOL    100 tablet    Take 2 tablets (524 mg) by mouth 4 times daily as needed    Dyspepsia and disorder of function of stomach       Blood Pressure Monitor Kit     1 kit    1 kit daily as needed    CHF (congestive heart failure) (H)       calcium-vitamin D 600-400 MG-UNIT per tablet    CALTRATE     Take 1 tablet by mouth 2 times daily        Cranberry 180 MG Caps      Take 1 capsule by mouth daily Unsure of strength        fish oil-omega-3 fatty acids 1000 MG capsule      Take 2 g by mouth daily. 2 capsules daily        FLAGYL PO      Take 500 mg by mouth 2 times daily    Acute infectious diarrhea       folic acid 1 MG tablet    FOLVITE    90 tablet    Take 1 tablet (1 mg) by mouth daily    Oral aphthae       GENTEAL MILD OP      Apply  to eye daily.        hydrocortisone 2.5 % cream     30 g    Apply BID to affected region(s) for 7-10 days.    Rash       levothyroxine 75 MCG tablet    SYNTHROID/LEVOTHROID    90 tablet    TAKE ONE TABLET BY MOUTH EVERY DAY    Hypothyroidism, unspecified type       losartan 25 MG tablet    COZAAR    90 tablet    Take 2 tablets  (50 mg) by mouth daily (with breakfast) Hold if SBP < 110.    Hypertension goal BP (blood pressure) < 150/90       metoprolol tartrate 25 MG tablet    LOPRESSOR    60 tablet    Take 1 tablet (25 mg) by mouth 2 times daily    Atrial fibrillation, unspecified type (H), Hypertension goal BP (blood pressure) < 150/90       nitroGLYcerin 0.4 MG sublingual tablet    NITROSTAT    25 tablet    Place 1 tablet (0.4 mg) under the tongue every 5 minutes as needed for chest pain    Chest pain       * order for DME     1 Box    Equipment being ordered: Dispense face mask. Mrs. Spicer is immunosuppressed due to rheumatoid arthritis.    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 Use with Albuterol.    Hypoxia       order for DME     1 each    Equipment being ordered: Dispense baffle, for use with nebulizer.    Pneumonia of left upper lobe due to Mycoplasma pneumoniae       * order for DME     1 each    Dispense SP Walker-small    Pain of toe of right foot, Status post bunionectomy       PRESERVISION AREDS PO      Take 1 tablet by mouth daily        ranibizumab 0.3 MG/0.05ML Soln    LUCENTIS     0.3 mg by Intravitreal route        ranitidine 150 MG tablet    ZANTAC    60 tablet    Take 1 tablet (150 mg) by mouth 2 times daily    Gastroesophageal reflux disease without esophagitis       REFRESH P.M. Oint      Apply  to eye. Daily at bedtime        senna-docusate 8.6-50 MG per tablet    SENOKOT-S;PERICOLACE    120 tablet    Take 2 tablets by mouth At Bedtime Hold if diarrhea occurs.    Constipation, chronic       spironolactone 25 MG tablet    ALDACTONE    90 tablet    Take 1 tablet (25 mg) by mouth every morning Hold if SBP is less than 120.    Congestive heart failure with preserved LV function, NYHA class 3 (H)       triamcinolone 0.1 % cream    KENALOG    453.6 g    Apply sparingly to affected area on face three times daily.     Facial lesion       ZYRTEC ALLERGY 10 MG tablet   Generic drug:  cetirizine      Take 10 mg by mouth At Bedtime        * Notice:  This list has 3 medication(s) that are the same as other medications prescribed for you. Read the directions carefully, and ask your doctor or other care provider to review them with you.

## 2018-01-16 NOTE — PROGRESS NOTES
CHIEF COMPLAINT:  Increased work of breathing.      HISTORY OF PRESENT ILLNESS:  Linda Spicer is an 86-year-old female with history of rheumatoid arthritis with a rheumatoid arthritis-associated interstitial lung disease.  She was seen about 10 months ago and had been doing well with stable symptoms. Since her last visit she has been having problems with arrhythmia and has had a pacemaker placed and recently had an emergency room visit for dizziness. Her daughter has noticed what appears to be increased work of breathing as she is talking, she appears dyspneic. The patient describes feeling fatigued and dizzy frequently and was seen in the emergency room where she received 2 liters of normal saline.  She had a recheck urine that was normal after treatment of a recent urinary tract infection. She also had other stable blood testing.  She has been having problems with blood pressure management and actually they discontinued 2 medications in that ED visit thought to potentially be contributing to her symptoms.  She denies problems with chest pain, PND or orthopnea.  No significant cough.  No significant mucous production.      PAST MEDICAL HISTORY, ALLERGIES, MEDICATIONS:  Reviewed.      PHYSICAL EXAMINATION:   GENERAL:  Thin female, no distress.   VITAL SIGNS:  Blood pressure 109/60, pulse 74, respirations 20.  Temperature is 97.1.  O2 saturation is 96% on room air.  Weight is 165 pounds.   HEENT:  Normocephalic, atraumatic.  Pupils are reactive to light.  Sclerae are without icterus.  Oropharynx is without evidence for thrush.   NECK:  Supple, without lymphadenopathy.   CHEST:  With dry crackles at the bases, mid and upper lung fields are clear bilaterally. No wheezes or rhonchi identified.   CARDIAC:  Appears regular without murmur.   EXTREMITIES:  Perfused without significant edema.        The patient appears to have had some CT scanning of the chest, abdomen and pelvis in the last year via Care Everywhere. Report  was reviewed.  This with 2017 and was done for trauma evaluation after a fall. Cardiomegaly was present, some emphysema and bibasilar fibrosis was present.  No new findings.  The pulmonary findings were thought to be stable.  Most recent labs show mild anemia that also has been stable.      ASSESSMENT:  An 86-year-old female with rheumatoid arthritis associated interstitial lung disease that has been stable for many years, but worse symptoms of dyspnea recently.  I suspect this may be more related to fatigue associated with blood pressure medications rather than primary progression of her underlying lung disease.  Her oxygen saturations are stable.      PLAN:  Further investigation for progression of lung disease with pulmonary function testing.  If there is a significant change would consider further evaluation with imaging.  The patient is up-to-date on influenza vaccination.  She is agreeable with this plan.  She already has followup scheduled with her cardiologist.  She will be following up in this clinic on a yearly basis.         JOSE LISA MD             D: 2018 12:15   T: 2018 12:43   MT: KRISTINE      Name:     RG WALTER   MRN:      [id number]        Account:      [id number]   :      1931           Visit Date:   2018      Document: [id number]

## 2018-01-18 ENCOUNTER — CARE COORDINATION (OUTPATIENT)
Dept: CARE COORDINATION | Facility: CLINIC | Age: 83
End: 2018-01-18

## 2018-01-18 NOTE — PROGRESS NOTES
"Clinic Care Coordination Contact  OUTREACH    Referral Information:  Referral Source: Self-patient/Caregiver  Reason for Contact: RN CC call from patient.  Care Conference: No     Universal Utilization:   ED Visits in last year: 9  Hospital visits in last year: 4  Last PCP appointment: 12/07/17  Missed Appointments: 1  Concerns: no  Multiple Providers or Specialists: yes    Clinical Concerns:  Current Medical Concerns: Patient calls to inform writer of recent hospitalization at [facility] for hypothyroidism, COPD, macular degenreation, paroxysmal atrial fibrillation, CHF, sick sinus syndrome, CKD stage 3, rheumatoid arthritis and dizziness.  Patient states for \"quite some time\" she has had increase in nasal congestion, difficulty breathing through her nose and facial fullness and pressure and small amounts of clear nasal discharge.  Patient unable to states exact timeline of symptoms.  Patient had an appointment with PCP for 1/15/18, however, she is in [facility] at that time.  Patient is inquiring what she can do.  RN CC reviewed imaging from [facility] in care everywhere and sinuses were noted to be clear.  Patient denies fever.  RN CC advised patient to utilize a saline nose spray and/or nasal irritation such as a Neti Pot and to use a humidifier in her home.  RN CC offered patient a clinic appointment with PCP for 1/19/18, however, patient declined and stated she would have her daughter schedule one for 1/22/18.    Current Behavioral Concerns: n/a      Education Provided to patient: RN CC educated patient on home care measures for nasal congestion.  Patient will be seen by PCP on 1/22/18.   Clinical Pathway Name: None    Medication Management:  Patient independent in medication management and verbalizes adherence and understanding of medication regimen.  Patient sets up her medications weekly and her daughter calls to remind her to take her evening medications.    Functional Status:  Mobility Status: " Independent  Equipment Currently Used at Home: raised toilet, shower chair  Transportation: Patient utilizes [employer] and her family provides transportation as needed.           Psychosocial:  Current living arrangement:: I live in a private home with spouse (Live in a Senior Apartment)  Financial/Insurance: No concerns.       Resources and Interventions:  Current Resources:  ; Other (see comment) (Heart Failure Action Plan)        Advanced Care Plans/Directives on file:: In process        Goals:   Goal 1 Statement: I will complete my health care directive.  Goal 1 Supportive Steps: Pt has attended a health care directive class, is awaiting to see her  to finalize the document. 10/24/17 Pt informed of CPR vs intubation and information mailed out to patient.  Goal 1 Progression Percent: 0%  Goal 1 Progression Date: 12/01/17  Goal 2 Statement: I will schedule an appointment with endocrinology.  Goal 2 Supportive Steps: RN CC provided pt with scheduling/referral information.  Goal 2 Progression Percent: 50%  Goal 2 Progression Date: 12/18/17              Barriers: multiple complex medical diagnoses  Strengths: has family support and is engaged in care coordination  Patient/Caregiver understanding: Patient verbalized understanding of home care measures to treat her nasal congestion until seen by PCP on 1/22/18.  Frequency of Care Coordination: monthly  Upcoming appointment: 01/22/18     Plan:   Patient will use saline nasal spray/irrigation and humidifier for nasal congestion.  Patient will see PCP on 1/22/18.  Patient will see endocrinologist as scheduled 4/23/18.  Patient will continue to work on completing her health care directive.  RN CC will continue to follow patient on a monthly and as needed basis.    Melissa Behl BSN, RN, PHN  [facility] Care Coordinator  [phone]

## 2018-01-22 ENCOUNTER — RADIANT APPOINTMENT (OUTPATIENT)
Dept: GENERAL RADIOLOGY | Facility: CLINIC | Age: 83
End: 2018-01-22
Attending: INTERNAL MEDICINE
Payer: MEDICARE

## 2018-01-22 ENCOUNTER — OFFICE VISIT (OUTPATIENT)
Dept: FAMILY MEDICINE | Facility: CLINIC | Age: 83
End: 2018-01-22
Payer: MEDICARE

## 2018-01-22 VITALS
HEART RATE: 65 BPM | HEIGHT: 64 IN | BODY MASS INDEX: 28 KG/M2 | SYSTOLIC BLOOD PRESSURE: 129 MMHG | WEIGHT: 164 LBS | TEMPERATURE: 97.9 F | DIASTOLIC BLOOD PRESSURE: 66 MMHG | OXYGEN SATURATION: 95 %

## 2018-01-22 DIAGNOSIS — I48.0 PAROXYSMAL ATRIAL FIBRILLATION (H): ICD-10-CM

## 2018-01-22 DIAGNOSIS — Z87.440 PERSONAL HISTORY OF URINARY TRACT INFECTION: ICD-10-CM

## 2018-01-22 DIAGNOSIS — I50.32 CHRONIC DIASTOLIC CONGESTIVE HEART FAILURE (H): ICD-10-CM

## 2018-01-22 DIAGNOSIS — N18.30 CKD (CHRONIC KIDNEY DISEASE) STAGE 3, GFR 30-59 ML/MIN (H): Primary | ICD-10-CM

## 2018-01-22 DIAGNOSIS — R09.81 CONGESTION OF PARANASAL SINUS: ICD-10-CM

## 2018-01-22 LAB
ALBUMIN UR-MCNC: NEGATIVE MG/DL
ANION GAP SERPL CALCULATED.3IONS-SCNC: 9 MMOL/L (ref 3–14)
APPEARANCE UR: CLEAR
BILIRUB UR QL STRIP: NEGATIVE
BUN SERPL-MCNC: 31 MG/DL (ref 7–30)
CALCIUM SERPL-MCNC: 9.3 MG/DL (ref 8.5–10.1)
CHLORIDE SERPL-SCNC: 103 MMOL/L (ref 94–109)
CO2 SERPL-SCNC: 27 MMOL/L (ref 20–32)
COLOR UR AUTO: YELLOW
CREAT SERPL-MCNC: 1.26 MG/DL (ref 0.52–1.04)
GFR SERPL CREATININE-BSD FRML MDRD: 40 ML/MIN/1.7M2
GLUCOSE SERPL-MCNC: 91 MG/DL (ref 70–99)
GLUCOSE UR STRIP-MCNC: NEGATIVE MG/DL
HGB UR QL STRIP: NEGATIVE
KETONES UR STRIP-MCNC: NEGATIVE MG/DL
LEUKOCYTE ESTERASE UR QL STRIP: NEGATIVE
NITRATE UR QL: NEGATIVE
NT-PROBNP SERPL-MCNC: 845 PG/ML (ref 0–450)
PH UR STRIP: 6 PH (ref 5–7)
POTASSIUM SERPL-SCNC: 4.3 MMOL/L (ref 3.4–5.3)
SODIUM SERPL-SCNC: 139 MMOL/L (ref 133–144)
SOURCE: NORMAL
SP GR UR STRIP: <=1.005 (ref 1–1.03)
UROBILINOGEN UR STRIP-ACNC: 0.2 EU/DL (ref 0.2–1)

## 2018-01-22 PROCEDURE — 81003 URINALYSIS AUTO W/O SCOPE: CPT | Performed by: INTERNAL MEDICINE

## 2018-01-22 PROCEDURE — 36415 COLL VENOUS BLD VENIPUNCTURE: CPT | Performed by: INTERNAL MEDICINE

## 2018-01-22 PROCEDURE — 80048 BASIC METABOLIC PNL TOTAL CA: CPT | Performed by: INTERNAL MEDICINE

## 2018-01-22 PROCEDURE — 83880 ASSAY OF NATRIURETIC PEPTIDE: CPT | Performed by: INTERNAL MEDICINE

## 2018-01-22 PROCEDURE — 99214 OFFICE O/P EST MOD 30 MIN: CPT | Performed by: INTERNAL MEDICINE

## 2018-01-22 PROCEDURE — 70220 X-RAY EXAM OF SINUSES: CPT | Mod: FY

## 2018-01-22 RX ORDER — LORATADINE 10 MG/1
10 TABLET ORAL EVERY MORNING
Status: ON HOLD | COMMUNITY
End: 2018-10-16

## 2018-01-22 RX ORDER — BUMETANIDE 1 MG/1
1 TABLET ORAL EVERY MORNING
Qty: 30 TABLET | Refills: 11 | Status: SHIPPED | OUTPATIENT
Start: 2018-01-22 | End: 2018-07-27

## 2018-01-22 RX ORDER — BUMETANIDE 1 MG/1
1 TABLET ORAL DAILY
COMMUNITY
End: 2018-01-22

## 2018-01-22 ASSESSMENT — PAIN SCALES - GENERAL: PAINLEVEL: NO PAIN (0)

## 2018-01-22 NOTE — MR AVS SNAPSHOT
After Visit Summary   1/22/2018    Linda Spicer    MRN: [id number]           Patient Information     Date Of Birth          6/13/1931        Visit Information        Provider Department      1/22/2018 9:40 AM Tre Lockett MD [facility]        Today's Diagnoses     Chronic diastolic congestive heart failure (H)    -  1    Paroxysmal atrial fibrillation (H)        Congestion of paranasal sinus        Personal history of urinary tract infection        CKD (chronic kidney disease) stage 3, GFR 30-59 ml/min          Care Instructions    At [facility], we strive to deliver an exceptional experience to you, every time we see you.  If you receive a survey in the mail, please send us back your thoughts. We really do value your feedback.    Based on your medical history, these are the current health maintenance/preventive care services that you are due for (some may have been done at this visit.)  Health Maintenance Due   Topic Date Due     FALL RISK ASSESSMENT  01/27/2018         Suggested websites for health information:  Www.FRS.Kaggle : Up to date and easily searchable information on multiple topics.  Www.medlineplus.gov : medication info, interactive tutorials, watch real surgeries online  Www.familydoctor.org : good info from the Academy of Family Physicians  Www.cdc.gov : public health info, travel advisories, epidemics (H1N1)  Www.aap.org : children's health info, normal development, vaccinations  Www.health[place] : [place] dept of health, public health issues in [place], N1N1    Your care team:                            Family Medicine Internal Medicine   MD Tre Hicks MD Shantel Branch-Fleming, MD Katya Georgiev PA-C Nam Ho, MD Pediatrics   ABY Snyder, MD Maritza Tinsley CNP, MD Deborah Mielke, MD Kim Thein, APRN CNP      Clinic hours: Monday - Thursday  7 am-7 pm; Fridays 7 am-5 pm.   Urgent care: Monday - Friday 11 am-9 pm; Saturday and Sunday 9 am-5 pm.  Pharmacy : Monday -Thursday 8 am-8 pm; Friday 8 am-6 pm; Saturday and Sunday 9 am-5 pm.     Clinic: [phone]   Pharmacy: [phone]                Follow-ups after your visit        Your next 10 appointments already scheduled     Feb 06, 2018  9:20 AM CST   [facility]    [address] [facility] [id number] [phone]            Feb 07, 2018 10:00 AM CST   Level 2 with [facility] ([facility])    [address] [place] [id number]   [phone]            Feb 14, 2018  2:30 PM CST   Office Visit with PFT LAB   [facility])    [address] [place] [id number] [phone]           Bring a current list of meds and any records pertaining to this visit. For Physicals, please bring immunization records and any forms needing to be filled out. Please arrive 10 minutes early to complete paperwork.            Mar 07, 2018 10:00 AM CST   Level 2 with [facility] ([facility])    [address] [place] [id number]   [phone]            Mar 19, 2018  1:10 PM CDT   Return Visit with Emeterio Loza MD   [facility])    [address] [place] [id number]   [phone]            Apr 04, 2018  4:00 PM CDT   Return Visit with DEVICE CHECK RN CARD   [facility])    [address] [place] [id number]   [phone]            Apr 23, 2018 11:00 AM CDT   New Visit with Tomi Peña MD   [facility]    [address] [place] [address]  [phone]            Jim 15, 2019  9:30 AM CST   Return Visit with Sandra Comer MD   [facility]    [address] [place] [id number] [phone]              Who to contact     If you have questions or need follow up information about today's clinic visit or your schedule please contact [facility] directly at [phone].  Normal or non-critical lab and imaging results will be communicated to you by Keyadehart, letter or phone within 4 business days after the clinic has received the results. If you do not hear from us within 7 days, please contact the clinic through yaM Labst or phone. If you have a critical or abnormal lab result, we will notify you by phone as soon as possible.  Submit refill requests through [facility] or call your pharmacy and they will forward the refill request to us. Please allow 3 business days for your refill to be completed.          Additional Information About Your Visit        Keyadehart Information     [facility] gives you secure access to your electronic health record. If you see a primary care provider, you can also send messages to your care team and make appointments. If you have questions, please call your primary care clinic.  If you do not have a primary care provider, please call [phone] and they will assist you.        Care EveryWhere ID     This is your Care EveryWhere ID. This could be used by other organizations to access your [facility] medical records  [id number]        Your Vitals Were     Pulse Temperature Height Pulse Oximetry BMI (Body Mass Index)       65 97.9  F (36.6  C) (Oral) 5' 4\" (1.626 m) 95% 28.15 kg/m2        Blood Pressure from Last 3 Encounters:   01/22/18 129/66   01/16/18 109/60   01/10/18 144/63    Weight from Last 3 Encounters:   01/22/18 164 lb (74.4 kg)   01/16/18 165 lb 1.6 oz (74.9 kg)   01/10/18 163 lb 3.2 oz (74 kg)              We Performed the " Following     Basic metabolic panel  (Ca, Cl, CO2, Creat, Gluc, K, Na, BUN)     BNP-N terminal pro     UA reflex to Microscopic and Culture     XR Sinus Complete G/E 3 Views          Today's Medication Changes          These changes are accurate as of: 1/22/18 10:42 AM.  If you have any questions, ask your nurse or doctor.               These medicines have changed or have updated prescriptions.        Dose/Directions    bumetanide 1 MG tablet   Commonly known as:  BUMEX   This may have changed:  when to take this   Used for:  Chronic diastolic congestive heart failure (H)   Changed by:  Tre Lockett MD        Dose:  1 mg   Take 1 tablet (1 mg) by mouth every morning   Quantity:  30 tablet   Refills:  11       LOSARTAN POTASSIUM PO   This may have changed:  Another medication with the same name was removed. Continue taking this medication, and follow the directions you see here.   Changed by:  Tre Lockett MD        Dose:  25 mg   Take 25 mg by mouth every evening   Refills:  0         Stop taking these medicines if you haven't already. Please contact your care team if you have questions.     spironolactone 25 MG tablet   Commonly known as:  ALDACTONE   Stopped by:  Tre Lockett MD                Where to get your medicines      These medications were sent to [facility] [address] [place] [address]     Phone:  [phone]     bumetanide 1 MG tablet                Primary Care Provider Office Phone # Fax #    Tre Lockett [phone]       [address]  [facility] [address]        Equal Access to Services     [facility]: Hadii rakesh schuster hadasho Soomaali, waaxda luqadaha, qaybta kaalmada adeegyada, zahraa moss . So [facility] [phone].    ATENCIÓN: Si habla español, tiene a merchant disposición servicios gratuitos de asistencia lingüística. Llame al [phone].    We  comply with applicable federal civil rights laws and [place] laws. We do not discriminate on the basis of race, color, national origin, age, disability, sex, sexual orientation, or gender identity.            Thank you!     Thank you for choosing [facility]  for your care. Our goal is always to provide you with excellent care. Hearing back from our patients is one way we can continue to improve our services. Please take a few minutes to complete the written survey that you may receive in the mail after your visit with us. Thank you!             Your Updated Medication List - Protect others around you: Learn how to safely use, store and throw away your medicines at www.disposemymeds.org.          This list is accurate as of: 1/22/18 10:42 AM.  Always use your most recent med list.                   Brand Name Dispense Instructions for use Diagnosis    apixaban ANTICOAGULANT 2.5 MG tablet    ELIQUIS    60 tablet    Take 1 tablet (2.5 mg) by mouth 2 times daily    Atrial flutter, paroxysmal (H)       azaTHIOprine 50 MG tablet    IMURAN    90 tablet    Take 1 tablet (50 mg) by mouth daily    Rheumatoid arthritis involving multiple sites with positive rheumatoid factor (H)       bismuth subsalicylate 262 MG chewable tablet    PEPTO BISMOL    100 tablet    Take 2 tablets (524 mg) by mouth 4 times daily as needed    Dyspepsia and disorder of function of stomach       Blood Pressure Monitor Kit     1 kit    1 kit daily as needed    CHF (congestive heart failure) (H)       bumetanide 1 MG tablet    BUMEX    30 tablet    Take 1 tablet (1 mg) by mouth every morning    Chronic diastolic congestive heart failure (H)       calcium-vitamin D 600-400 MG-UNIT per tablet    CALTRATE     Take 1 tablet by mouth 2 times daily        Cranberry 180 MG Caps      Take 1 capsule by mouth daily Unsure of strength        fish oil-omega-3 fatty acids 1000 MG capsule      Take 2 g by mouth daily. 2 capsules daily         FLAGYL PO      Take 500 mg by mouth 2 times daily    Acute infectious diarrhea       folic acid 1 MG tablet    FOLVITE    90 tablet    Take 1 tablet (1 mg) by mouth daily    Oral aphthae       GENTEAL MILD OP      Apply  to eye daily.        hydrocortisone 2.5 % cream     30 g    Apply BID to affected region(s) for 7-10 days.    Rash       levothyroxine 75 MCG tablet    SYNTHROID/LEVOTHROID    90 tablet    TAKE ONE TABLET BY MOUTH EVERY DAY    Hypothyroidism, unspecified type       loratadine 10 MG tablet    CLARITIN     Take 10 mg by mouth every morning        LOSARTAN POTASSIUM PO      Take 25 mg by mouth every evening        metoprolol tartrate 25 MG tablet    LOPRESSOR    60 tablet    Take 1 tablet (25 mg) by mouth 2 times daily    Atrial fibrillation, unspecified type (H), Hypertension goal BP (blood pressure) < 150/90       nitroGLYcerin 0.4 MG sublingual tablet    NITROSTAT    25 tablet    Place 1 tablet (0.4 mg) under the tongue every 5 minutes as needed for chest pain    Chest pain       * order for DME     1 Box    Equipment being ordered: Dispense face mask. Mrs. Spicer is immunosuppressed due to rheumatoid arthritis.    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 Use with Albuterol.    Hypoxia       order for DME     1 each    Equipment being ordered: Dispense baffle, for use with nebulizer.    Pneumonia of left upper lobe due to Mycoplasma pneumoniae       * order for DME     1 each    Dispense SP Walker-small    Pain of toe of right foot, Status post bunionectomy       PRESERVISION AREDS PO      Take 1 tablet by mouth daily        ranibizumab 0.3 MG/0.05ML Soln    LUCENTIS     0.3 mg by Intravitreal route        ranitidine 150 MG tablet    ZANTAC    60 tablet    Take 1 tablet (150 mg) by mouth 2 times daily    Gastroesophageal reflux disease without esophagitis       REFRESH P.M. Oint      Apply  to  eye. Daily at bedtime        senna-docusate 8.6-50 MG per tablet    SENOKOT-S;PERICOLACE    120 tablet    Take 2 tablets by mouth At Bedtime Hold if diarrhea occurs.    Constipation, chronic       triamcinolone 0.1 % cream    KENALOG    453.6 g    Apply sparingly to affected area on face three times daily.    Facial lesion       ZYRTEC ALLERGY 10 MG tablet   Generic drug:  cetirizine      Take 10 mg by mouth At Bedtime        * Notice:  This list has 3 medication(s) that are the same as other medications prescribed for you. Read the directions carefully, and ask your doctor or other care provider to review them with you.

## 2018-01-22 NOTE — PATIENT INSTRUCTIONS
At [facility], we strive to deliver an exceptional experience to you, every time we see you.  If you receive a survey in the mail, please send us back your thoughts. We really do value your feedback.    Based on your medical history, these are the current health maintenance/preventive care services that you are due for (some may have been done at this visit.)  Health Maintenance Due   Topic Date Due     FALL RISK ASSESSMENT  01/27/2018         Suggested websites for health information:  Www.Risingsun.org : Up to date and easily searchable information on multiple topics.  Www.medlineplus.gov : medication info, interactive tutorials, watch real surgeries online  Www.familydoctor.org : good info from the Academy of Family Physicians  Www.cdc.gov : public health info, travel advisories, epidemics (H1N1)  Www.aap.org : children's health info, normal development, vaccinations  Www.health.[facility].[place].us : [place] dept of health, public health issues in [place], N1N1    Your care team:                            Family Medicine Internal Medicine   MD Tre Hicks MD Shantel Branch-Fleming, MD Katya Georgiev PA-C Nam Ho, MD Pediatrics   ABY Snyder, MD Maritza Tinsley CNP, MD Deborah Mielke, MD Kim Thein, APRN CNP      Clinic hours: Monday - Thursday 7 am-7 pm; Fridays 7 am-5 pm.   Urgent care: Monday - Friday 11 am-9 pm; Saturday and Sunday 9 am-5 pm.  Pharmacy : Monday -Thursday 8 am-8 pm; Friday 8 am-6 pm; Saturday and Sunday 9 am-5 pm.     Clinic: [phone]   Pharmacy: [phone]

## 2018-01-22 NOTE — PROGRESS NOTES
[facility] Internal Medicine Progress Note           Assessment and Plan:      (N18.3) CKD (chronic kidney disease) stage 3, GFR 30-59 ml/min  (primary encounter diagnosis)  Comment: Transient increase in BUN and creatinine led to orthostatic hypotension.  and brief hospitalization at [facility]. This is most likely triggered by recent treatment with Levaquin plus chronic intake of Spironolactone and Losartan.   Plan: Basic metabolic panel  (Ca, Cl, CO2, Creat,         Gluc, K, Na, BUN)            (I50.32) Chronic diastolic congestive heart failure (H)  Comment: Not at goal weight, but reconsider adjusting weight. Still take Bumetanide at lowest possible dose to prevent pre-renal azotemia again. ProBNP unremarkable.  Plan: BNP-N terminal pro, bumetanide (BUMEX) 1 MG         tablet            (I48.0) Paroxysmal atrial fibrillation (H)  Comment: Continue Metoprolol Tartrate 25 mg BID and Eliquis.at the same dose as long as GFR is more than 30.  Plan: LOSARTAN POTASSIUM PO, Basic metabolic panel          (Ca, Cl, CO2, Creat, Gluc, K, Na, BUN)            (R09.81) Congestion of paranasal sinus  Comment: Unremarkable sinus x-rays.  Plan: XR Sinus Complete G/E 3 Views            (Z87.440) Personal history of urinary tract infection  Comment: UTI has resolved with recent treatment with Levaquin. Avoid prophylactic treatment due to increasing intolerance to quinolones.  Plan: UA reflex to Microscopic and Culture                       Interval History:        This is an 86 year old female with paroxysmal atrial fibrillation, S/P permanent pacemekr placement last 2/23/2017, who comes in today for a follow-up after ER visit. Mrs. Spicer [facility] ER due to complaints of dizziness. Head imaging tests are unremarkable. CBC shows no leukocytosis, mild anemia and non thrombocytopenia. Chemistry panel is unremarkable. Pacemaker interrogation since episodes of atrial  fibrillation/atrial flutter since 12/4/2017, maximal heart rates of 286-535 bpm (?), with an ongoing AT/AF at the time of transmission last 1/15/2018 (7:30 pm), that started on 1/14/2018 (11:30 am). According to the patient, her dizziness started while she was in the shower in the morning of 1/15/18, and reproducible when standing. It appears that her orthostatic vitals are positive; her reported systolic blood pressure on standing was in the 90s, The patient was then admitted wherein she was given intravenous fluids and her condition upon discharge the following day. It was reported that the patient was taking Ciprofloxacin for another episode of UTI. On further review, the patient was given Levofloxacin last 1/4/2018 at the Urgent Care. At that time, her urinalysis shows pyuria, with WBC > 100, large leukocyte esterase, cloudy urinary specimen with few bacteria. Due to subsequent finding of suprapubic tenderness, the patient was given Levofloxacin upon discussion by the Urgent Care staff with this provider.        Since hospital discharge, the patient claims claims that she gained weight, approximately more than 8 pounds above her baseline (156 pounds). Linda denies any orthopnea nor bipedal edema. Her blood pressure at home peaked at  (Losartan and Spironolactone were held upon hospital discharge). Other minor concerns include nasal congestion without any purulent nasal drainage.            Significant Problems:   Patient Active Problem List   Diagnosis     ACP (advance care planning)     Hypertension goal BP (blood pressure) < 150/90     Hypothyroidism     Macular degeneration, left eye     Irritable bowel syndrome     Encounter for palliative care     Adjustment disorder with anxious mood     Mild anemia     DDD (degenerative disc disease), lumbar     CKD (chronic kidney disease) stage 3, GFR 30-59 ml/min     History of blood transfusion     Aftercare following surgery     S/P lumbar laminectomy      "High risk medication use     Age-related osteoporosis without current pathological fracture     Atrophic vaginitis     Fecal incontinence     Female stress incontinence     Impingement syndrome of both shoulders     UIP (usual interstitial pneumonitis) (H)     High risk medications (not anticoagulants) long-term use     Heart failure with preserved ejection fraction (H)     Congestive heart failure with preserved LV function, NYHA class 3 (H)     Other specified hypothyroidism     Rheumatoid arthritis involving multiple sites with positive rheumatoid factor (H)     Health Care Home     Sick sinus syndrome (H)     Cardiac pacemaker - Medtronic dual lead pacemaker - Not Dependent - MRI Safe     Immunosuppression (H)     ILD (interstitial lung disease) (H)              Review of Systems:   CONSTITUTIONAL:POSITIVE  for weight gain and NEGATIVE  for chills, fever , malaise and myalgias  I: NEGATIVE for worrisome rashes, moles or lesions  E: NEGATIVE for vision changes or irritation  E/M: NEGATIVE for ear, mouth and throat problems  R: NEGATIVE for significant cough or SOB  B: NEGATIVE for masses, tenderness or discharge  CV: NEGATIVE for chest pain, palpitations or peripheral edema  GI: NEGATIVE for nausea, abdominal pain, heartburn, or change in bowel habits  : NEGATIVE for frequency, dysuria, or hematuria  MUSCULOSKELETAL:POSITIVE  for rheumatoid arthritis, currently on Azathioprine.  N: NEGATIVE for weakness, dizziness or paresthesias  E: NEGATIVE for temperature intolerance, skin/hair changes  H: NEGATIVE for bleeding problems  P: NEGATIVE for changes in mood or affect             Physical Exam:   /66  Pulse 65  Temp 97.9  F (36.6  C) (Oral)  Ht 5' 4\" (1.626 m)  Wt 164 lb (74.4 kg)  SpO2 95%  BMI 28.15 kg/m2  Constitutional: awake, alert, cooperative, no apparent distress and appears stated age  Eyes: sclera clear and conjunctiva normal  ENT: normocepalic, without obvious abnormality  Lungs: No " increased work of breathing, good air exchange, clear to auscultation bilaterally, no crackles or wheezing.  Cardiovascular: Regular rate and rhythm, normal S1 and S2, no S3 or S4, and no murmur noted.  Musculoskeletal: No redness, warmth, or swelling of the joints.    Neurologic: Awake, alert, oriented to name, place and time.  Cranial nerves II-XII are grossly intact.  Motor is 5 out of 5 bilaterally.  Neuropsychiatric: Normal affect, mood, orientation, memory and insight.  Skin: No rashes, erythema, pallor, petechia or purpura.            Data:   XR SINUS COMPLETE G/E 3 VW 1/22/2018 10:15 AM      HISTORY: ; Congestion of paranasal sinus     COMPARISON: 4/19/2016         IMPRESSION: The paranasal sinuses and mastoid air cells are aerated.  No significant mucosal thickening or air-fluid levels.     WILFRIDO ANDRADE MD         Color Urine Yellow    Final 01/22/2018 10:25 AM BK   Appearance Urine Clear    Final 01/22/2018 10:25 AM BK   Glucose Urine Negative  NEG^Negative mg/dL Final 01/22/2018 10:25 AM BK   Bilirubin Urine Negative  NEG^Negative  Final 01/22/2018 10:25 AM BK   Ketones Urine Negative  NEG^Negative mg/dL Final 01/22/2018 10:25 AM BK   Specific Gravity Urine <=1.005  1.003 - 1.035  Final 01/22/2018 10:25 AM BK   Blood Urine Negative  NEG^Negative  Final 01/22/2018 10:25 AM BK   pH Urine 6.0  5.0 - 7.0 pH Final 01/22/2018 10:25 AM BK   Protein Albumin Urine Negative  NEG^Negative mg/dL Final 01/22/2018 10:25 AM BK   Urobilinogen Urine 0.2  0.2 - 1.0 EU/dL Final 01/22/2018 10:25 AM BK   Nitrite Urine Negative  NEG^Negative  Final 01/22/2018 10:25 AM BK   Leukocyte Esterase Urine Negative  NEG^Negative  Final 01/22/2018 10:25 AM BK   Source Midstream Urine    Final 01/22/2018 10:25 AM      Sodium 139  133 - 144 mmol/L Final 01/22/2018 10:18 AM MG   Potassium 4.3  3.4 - 5.3 mmol/L Final 01/22/2018 10:18 AM MG   Chloride 103  94 - 109 mmol/L Final 01/22/2018 10:18 AM MG   Carbon Dioxide 27  20 - 32 mmol/L  Final 01/22/2018 10:18 AM MG   Anion Gap 9  3 - 14 mmol/L Final 01/22/2018 10:18 AM MG   Glucose 91  70 - 99 mg/dL Final 01/22/2018 10:18 AM MG   Comment:   Non Fasting   Urea Nitrogen 31 (H) 7 - 30 mg/dL Final 01/22/2018 10:18 AM MG   Creatinine 1.26 (H) 0.52 - 1.04 mg/dL Final 01/22/2018 10:18 AM MG   GFR Estimate 40 (L) >60 mL/min/1.7m2 Final 01/22/2018 10:18 AM MG   Comment:   Non  GFR Calc   GFR Estimate If Black 49 (L) >60 mL/min/1.7m2 Final 01/22/2018 10:18 AM MG   Comment:   African American GFR Calc   Calcium 9.3  8.5 - 10.1 mg/dL Final 01/22/2018 10:18 AM      N-Terminal Pro Bnp 845 (H) 0 - 450 pg/mL Final 01/22/2018 10:18 AM 51        Disposition: Follow-up visit if condition worsens.      Tre Lockett MD  Internal Medicine  [facility] Team

## 2018-01-30 ENCOUNTER — TELEPHONE (OUTPATIENT)
Dept: CARDIOLOGY | Facility: CLINIC | Age: 83
End: 2018-01-30

## 2018-01-30 DIAGNOSIS — I48.92 ATRIAL FLUTTER, PAROXYSMAL (H): ICD-10-CM

## 2018-01-30 NOTE — TELEPHONE ENCOUNTER
Called and spoke to patient. Informed her that Dr Loza received a letter from [employer] patient assistance foundation. The letter states that patient is not eligible to receive Eliquis because if is covered by insurance. She has not received anything from [facility] yet. She will fill the prescription and let us know if it is affordable. She also needs refill.   Will notify [facility]. It was refilled on 12/2017 with a local print, which was sent to [employer] for the assistance.      Component      Latest Ref Rng & Units 11/27/2017 1/22/2018   WBC      4.0 - 11.0 10e9/L 7.3    RBC Count      3.8 - 5.2 10e12/L 3.56 (L)    Hemoglobin      11.7 - 15.7 g/dL 11.8    Hematocrit      35.0 - 47.0 % 35.9    MCV      78 - 100 fl 101 (H)    MCH      26.5 - 33.0 pg 33.1 (H)    MCHC      31.5 - 36.5 g/dL 32.9    RDW      10.0 - 15.0 % 12.5    Platelet Count      150 - 450 10e9/L 271    Diff Method       Automated Method    % Neutrophils      % 59.6    % Lymphocytes      % 28.1    % Monocytes      % 9.9    % Eosinophils      % 2.1    % Basophils      % 0.3    Absolute Neutrophil      1.6 - 8.3 10e9/L 4.4    Absolute Lymphocytes      0.8 - 5.3 10e9/L 2.1    Absolute Monocytes      0.0 - 1.3 10e9/L 0.7    Absolute Eosinophils      0.0 - 0.7 10e9/L 0.2    Absolute Basophils      0.0 - 0.2 10e9/L 0.0    Sodium      133 - 144 mmol/L  139   Potassium      3.4 - 5.3 mmol/L  4.3   Chloride      94 - 109 mmol/L  103   Carbon Dioxide      20 - 32 mmol/L  27   Anion Gap      3 - 14 mmol/L  9   Glucose      70 - 99 mg/dL  91   Urea Nitrogen      7 - 30 mg/dL  31 (H)   Creatinine      0.52 - 1.04 mg/dL  1.26 (H)   GFR Estimate      >60 mL/min/1.7m2  40 (L)   GFR Estimate If Black      >60 mL/min/1.7m2  49 (L)   Calcium      8.5 - 10.1 mg/dL  9.3

## 2018-01-31 NOTE — TELEPHONE ENCOUNTER
Pt called stating that she had RX for Eliquis filled with her new Insurance and it is still ore than she can afford.  Her co pay is approximately $200 a month. Pt would like a letter sent to [employer] asking for assistance as discussed.    Pt aware that [facility] out of clinic until Friday 2/2/18.    Please call patient at [phone] when return to discuss.    Yuly Gaitan LPN

## 2018-02-02 ENCOUNTER — TELEPHONE (OUTPATIENT)
Dept: CARDIOLOGY | Facility: CLINIC | Age: 83
End: 2018-02-02

## 2018-02-02 NOTE — TELEPHONE ENCOUNTER
Called and spoke to [employer]. They stated that patient had to pay 3% or $552.50 out of pocket first, then they would consider her for the patient assistance program. She would need to fax or mail a pharmacy statement with the amount paid to [employer].   Left message for patient with above information. Will try and reach her Monday.

## 2018-02-05 ENCOUNTER — CARE COORDINATION (OUTPATIENT)
Dept: CARE COORDINATION | Facility: CLINIC | Age: 83
End: 2018-02-05

## 2018-02-05 NOTE — PROGRESS NOTES
Clinic Care Coordination Contact  Patient called into RN CC to inquire if she can have her shingles immunization on the same day as her Orencia infusion.    To Dr. Davenport to please advise.    Melissa Behl BSN, RN, N  [facility] Care Coordinator  [phone]

## 2018-02-06 ENCOUNTER — OFFICE VISIT (OUTPATIENT)
Dept: RHEUMATOLOGY | Facility: CLINIC | Age: 83
End: 2018-02-06
Payer: MEDICARE

## 2018-02-06 ENCOUNTER — TELEPHONE (OUTPATIENT)
Dept: CARDIOLOGY | Facility: CLINIC | Age: 83
End: 2018-02-06

## 2018-02-06 VITALS
DIASTOLIC BLOOD PRESSURE: 70 MMHG | HEART RATE: 65 BPM | HEIGHT: 64 IN | OXYGEN SATURATION: 99 % | SYSTOLIC BLOOD PRESSURE: 116 MMHG | WEIGHT: 163.2 LBS | BODY MASS INDEX: 27.86 KG/M2

## 2018-02-06 DIAGNOSIS — Z79.899 HIGH RISK MEDICATION USE: ICD-10-CM

## 2018-02-06 DIAGNOSIS — M05.79 RHEUMATOID ARTHRITIS INVOLVING MULTIPLE SITES WITH POSITIVE RHEUMATOID FACTOR (H): Primary | ICD-10-CM

## 2018-02-06 PROCEDURE — 99213 OFFICE O/P EST LOW 20 MIN: CPT | Performed by: INTERNAL MEDICINE

## 2018-02-06 NOTE — PATIENT INSTRUCTIONS
Dr. Davenport s Rheumatology Clinics  Locations Clinic Hours Telephone Number     [facility]  [address]  [place] [address]     Wednesday: 7:20AM - 4:00PM  Thursday:     7:20AM - 4:00PM     Friday:          7:20AM - 11:00AM       To schedule an appointment with  Dr. Davenport,  please contact  Specialty [phone]       [facility]  [address]  [place] [address]       Monday:       7:20AM - 4:00PM        [facility]  [address]  [place] [address]       Tuesday:      7:20AM - 4:00PM          Thank you!    Nicole Will CMA

## 2018-02-06 NOTE — PROGRESS NOTES
"Rheumatology Clinic Visit      Linda Spicer MRN# [id number]   YOB: 1931 Age: 86 year old      Date of visit: 2/06/18   PCP: Dr. Tre Lockett  Pulmonology: Dr. Sandra Comer  Cardiology: Dr. Emeterio Loza  Dermatology: Dr. Andrews Knott at [facility] in [place]     Chief Complaint   Patient presents with:  Arthritis: RA, patient states she is doing good just has a runny nose.       Assessment and Plan     1. Seropositive Nonerosive Rheumatoid Arthritis (RF 99, CCP 52): Previously treated with hydroxychloroquine 200 mg daily (stopped previously because of macular degeneration), methotrexate (leg cramps), Cimzia (stopped due to recurrent basal cell carcinoma and hx of heart failure). Has sulfa allergy. Leflunomide avoided because of ILD.  Currently on Orencia IV and azathioprine 50mg daily. Doing well today and therefore will maintain the current medication regimen.  TPMT 23.2 (normal 24-44) and considered \"intermediate activity\".  - Continue orencia 750mg IV every 4 weeks, administered  at the [facility]  - Continue azathioprine 50mg daily  - Labs every 3 months to be done with her Orencia infusions (every third infusion): CBC, Creatinine, Hepatic Panel    2. Bilateral shoulder impingement syndrome, history: Maintaining ROM with exercises at home. She was previously referred to PT but did not go. Not an issue today.     3. History of basal cell carcinoma: Reportedly with lesions removed in 2007, fall 2016 and fall 2017.    4. Heart failure: She is followed by cardiology.  Has a pacemaker, per patient.     5. 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  2013 chest CT with contrast performed at [facility] documents UIP pattern. She was then seen by Dr. Comer on 3/14/2017.  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     6. Bone Health: Managed by PCP already.     Ms. Spicer " verbalized agreement with and understanding of the rational for the diagnosis and treatment plan.  All questions were answered to best of my ability and the patient's satisfaction. Ms. Spicer was advised to contact the clinic with any questions that may arise after the clinic visit.      Thank you for involving me in the care of the patient    Return to clinic: 3 months      HPI   Linda Spicer is a 86 year old female with a medical history significant for hypertension, heart failure, pulmonary fibrosis, audible bowel syndrome, degenerative disc disease of the lumbar spine status post laminectomy, chronic kidney disease, history of basal cell carcinoma, and rheumatoid arthritis who presents for follow-up of rheumatoid arthritis.    Today, she reports that her joints are doing well.  She has had multiple emergency department visits because of cardiac issues.  Also having recurrent UTIs.  She is not sure if she is completely voiding her urine.  Currently without joint pain except for her knees on occasion.  No morning stiffness.  No gelling phenomenon.    Denies fevers, chills, nausea, vomiting. No abdominal pain. No chest pain/pressure, palpitations.  Chronic SOB. No oral or nasal sores. No neck pain.   No photosensitivity or photophobia.  No sicca symptoms.  No eye pain or redness.     Tobacco: None  EtOH: rare  Drugs: none  Used to live in [place], [place] part time.    ROS   GEN: No fevers, chills, night sweats, or weight change  SKIN: See HPI  HEENT: No epistaxis. No oral or nasal ulcers.  CV: No chest pain, pressure.  Monitoring her weight closely  PULM: No wheeze, cough.  GI: No nausea, vomiting. No blood in stool. No abdominal pain.  : No blood in urine.  MSK: See HPI.  EXT: No LE swelling  PSYCH: Negative    Active Problem List     Patient Active Problem List   Diagnosis     ACP (advance care planning)     Hypertension goal BP (blood pressure) < 150/90     Hypothyroidism     Macular degeneration, left eye      Irritable bowel syndrome     Encounter for palliative care     Adjustment disorder with anxious mood     Mild anemia     DDD (degenerative disc disease), lumbar     CKD (chronic kidney disease) stage 3, GFR 30-59 ml/min     History of blood transfusion     Aftercare following surgery     S/P lumbar laminectomy     High risk medication use     Age-related osteoporosis without current pathological fracture     Atrophic vaginitis     Fecal incontinence     Female stress incontinence     Impingement syndrome of both shoulders     UIP (usual interstitial pneumonitis) (H)     High risk medications (not anticoagulants) long-term use     Heart failure with preserved ejection fraction (H)     Congestive heart failure with preserved LV function, NYHA class 3 (H)     Other specified hypothyroidism     Rheumatoid arthritis involving multiple sites with positive rheumatoid factor (H)     Health Care Home     Sick sinus syndrome (H)     Cardiac pacemaker - Medtronic dual lead pacemaker - Not Dependent - MRI Safe     Immunosuppression (H)     ILD (interstitial lung disease) (H)     Past Medical History     Past Medical History:   Diagnosis Date     Adjustment disorder with anxious mood 5/18/2015     Advanced directives, counseling/discussion 8/30/2012    Patient states has Advance Directive and will bring in a copy to clinic. 8/30/2012   Tevin May  [facility] Medical Assistant \       Anemia 9/25/2015     Basal cell cancer      CHF (congestive heart failure) (H) 9/18/2014     CKD (chronic kidney disease) stage 3, GFR 30-59 ml/min 9/29/2015     DDD (degenerative disc disease), lumbar 9/25/2015     Diffuse idiopathic pulmonary fibrosis (H) 5/6/2013     Encounter for palliative care 5/18/2015     History of blood transfusion 9/29/2015     Hypertension goal BP (blood pressure) < 140/90 9/7/2012     Hypothyroid 9/7/2012     Irritable bowel syndrome 10/29/2013     Macular degeneration      Macular degeneration, 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      Nondisplaced spiral fracture of shaft of humerus      Osteoporosis 8/13/2013     [employer] Update utility     RA (rheumatoid arthritis) (H) 5/7/2013     Rheumatic fever      Shingles      Spinal stenosis of lumbar region with neurogenic claudication 9/14/2015     Past Surgical History     Past Surgical History:   Procedure Laterality Date     APPENDECTOMY       BIOPSY      hemorrhoidectomy     ENT SURGERY      tonsillectomy     GYN SURGERY      3 D & C's     HYSTERECTOMY, PAP NO LONGER INDICATED       LAMINECTOMY LUMBAR ONE LEVEL N/A 10/13/2015    Procedure: LAMINECTOMY LUMBAR ONE LEVEL;  Surgeon: Fransico Toussaint MD;  Location: [facility] OR     Allergy     Allergies   Allergen Reactions     Cephalexin Hcl Diarrhea     Gabapentin Other (See Comments)     Dizzsiness     Naproxen GI Disturbance     Perfume      Lactase Other (See Comments)     Macrobid [Nitrofurantoin Anhydrous]      Possibly related to lung disease      Sulfa Drugs      Throat swelling     Xanax [Alprazolam] Other (See Comments)     Dizziness      Current Medication List     Current Outpatient Prescriptions   Medication Sig     apixaban ANTICOAGULANT (ELIQUIS) 2.5 MG tablet Take 1 tablet (2.5 mg) by mouth 2 times daily     LOSARTAN POTASSIUM PO Take 25 mg by mouth every evening     loratadine (CLARITIN) 10 MG tablet Take 10 mg by mouth every morning     bumetanide (BUMEX) 1 MG tablet Take 1 tablet (1 mg) by mouth every morning     metoprolol (LOPRESSOR) 25 MG tablet Take 1 tablet (25 mg) by mouth 2 times daily     folic acid (FOLVITE) 1 MG tablet Take 1 tablet (1 mg) by mouth daily     levothyroxine (SYNTHROID/LEVOTHROID) 75 MCG tablet TAKE ONE TABLET BY MOUTH EVERY DAY     ranitidine (ZANTAC) 150 MG tablet Take 1 tablet (150 mg) by mouth 2 times daily     azaTHIOprine (IMURAN) 50 MG tablet Take 1 tablet (50 mg) by mouth daily     order for DME Dispense TIESHA Curran     ranibizumab (LUCENTIS) 0.3 MG/0.05ML SOLN 0.3 mg by Intravitreal route     Cranberry  180 MG CAPS Take 1 capsule by mouth daily Unsure of strength     order for DME Equipment being ordered: Dispense baffle, for use with nebulizer.     order for DME Equipment being ordered: Nebulizer. Use with Albuterol.     order for DME Equipment being ordered: Dispense face mask.  Mrs. Spicer is immunosuppressed due to rheumatoid arthritis.     triamcinolone (KENALOG) 0.1 % cream Apply sparingly to affected area on face three times daily.     Multiple Vitamins-Minerals (PRESERVISION AREDS PO) Take 1 tablet by mouth daily      Blood Pressure Monitor KIT 1 kit daily as needed     nitroglycerin (NITROSTAT) 0.4 MG SL tablet Place 1 tablet (0.4 mg) under the tongue every 5 minutes as needed for chest pain     cetirizine (ZYRTEC ALLERGY) 10 MG tablet Take 10 mg by mouth At Bedtime     Hypromellose (GENTEAL MILD OP) Apply  to eye daily.     Artificial Tear Ointment (REFRESH P.M.) OINT Apply  to eye. Daily at bedtime        calcium-vitamin D (CALTRATE) 600-400 MG-UNIT per tablet Take 1 tablet by mouth 2 times daily      fish oil-omega-3 fatty acids (FISH OIL) 1000 MG capsule Take 2 g by mouth daily. 2 capsules daily          bismuth subsalicylate (PEPTO BISMOL) 262 MG chewable tablet Take 2 tablets (524 mg) by mouth 4 times daily as needed (Patient not taking: Reported on 2/6/2018)     hydrocortisone 2.5 % cream Apply BID to affected region(s) for 7-10 days. (Patient not taking: Reported on 2/6/2018)     MetroNIDAZOLE (FLAGYL PO) Take 500 mg by mouth 2 times daily      senna-docusate (SENOKOT-S;PERICOLACE) 8.6-50 MG per tablet Take 2 tablets by mouth At Bedtime Hold if diarrhea occurs. (Patient not taking: Reported on 2/6/2018)     No current facility-administered medications for this visit.      Facility-Administered Medications Ordered in Other Visits   Medication     DOBUTamine 500 mg in dextrose 5% 250 mL (adult std)     DOBUTamine 500 mg in dextrose 5% 250 mL (adult std)       Social History   See HPI    Family History  "    Family History   Problem Relation Age of Onset     Hypertension Mother      Psychotic Disorder Father      DIABETES Son      DIABETES Daughter      Blood Disease Daughter      Physical Exam     Temp Readings from Last 3 Encounters:   01/22/18 97.9  F (36.6  C) (Oral)   01/16/18 97.1  F (36.2  C)   01/10/18 97.1  F (36.2  C) (Oral)     BP Readings from Last 5 Encounters:   02/06/18 116/70   01/22/18 129/66   01/16/18 109/60   01/10/18 144/63   12/13/17 152/61     Pulse Readings from Last 1 Encounters:   02/06/18 65     Resp Readings from Last 1 Encounters:   01/16/18 20     Estimated body mass index is 28.01 kg/(m^2) as calculated from the following:    Height as of this encounter: 1.626 m (5' 4\").    Weight as of this encounter: 74 kg (163 lb 3.2 oz).    GEN: NAD  HEENT: MMM. No oral lesions. Anicteric, noninjected sclera  CV: S1, S2. RRR. No m/r/g.  PULM: CTA bilaterally. No w/c.   MSK: Hands, wrists, elbows, and shoulders without swelling or tenderness to palpation. Hips nontender to direct palpation. Bilateral knees with mild medial joint line tenderness but no effusion or increased warmth.  Ankles and MTPs without swelling or tenderness to palpation.  Negative MCP and MTP squeeze bilaterally.   NEURO: UE and LE strengths 5/5 and equal bilaterally.   SKIN: No rash  PSYCH: Alert. Appropriate.    Labs / Imaging (select studies)   RF/CCP  Recent Labs   Lab Test  04/05/16   1036   CCPIGG  52*   RHF  99*     CBC  Recent Labs   Lab Test  11/27/17   1319  11/25/17   1007  11/10/17   0925   WBC  7.3  6.1  6.2   RBC  3.56*  3.42*  3.68*   HGB  11.8  11.3*  12.0   HCT  35.9  34.4*  36.8   MCV  101*  101*  100   RDW  12.5  12.2  12.1   PLT  271  242  231   MCH  33.1*  33.0  32.6   MCHC  32.9  32.8  32.6   NEUTROPHIL  59.6  59.9  63.2   LYMPH  28.1  26.9  24.7   MONOCYTE  9.9  10.7  10.1   EOSINOPHIL  2.1  2.3  1.8   BASOPHIL  0.3  0.2  0.2   ANEU  4.4  3.6  3.9   ALYM  2.1  1.6  1.5   YEHUDA  0.7  0.7  0.6   AEOS  0.2  " 0.1  0.1   ABAS  0.0  0.0  0.0     CMP  Recent Labs   Lab Test  01/22/18   1018  12/07/17   1157  11/25/17   1007  11/10/17   0925  11/01/17   0831  08/18/17   0934   03/24/17   1401  02/16/17   0949  12/22/16   1506   NA  139   --   138   --   139   --    --   138  138  139   POTASSIUM  4.3   --   5.0   --   4.3   --    --   3.7  4.7  4.8   CHLORIDE  103   --   99   --   105   --    --   104  103  105   CO2  27   --   32   --   31   --    --   23  27  26   ANIONGAP  9   --   7   --   3   --    --   11  8  8   GLC  91   --   86   --   97   --    --   96  89  146*   BUN  31*   --   33*   --   42*   --    --   34*  30  37*   CR  1.26*  1.26*  1.60*  1.23*  1.41*  1.18*   < >  1.31*  1.24*  1.22*   GFRESTIMATED  40*  40*  31*  41*  35*  43*   < >  39*  41*  42*   GFRESTBLACK  49*  49*  37*  50*  43*  53*   < >  47*  50*  51*   FATOUMATA  9.3   --   9.8   --   9.8   --    --   8.7  8.5  9.2   BILITOTAL   --    --   0.6  0.5  0.5  0.5   < >  0.5  0.3  0.4   ALBUMIN   --    --   3.3*  3.5  3.6  3.7   < >  3.5  3.6  3.9   PROTTOTAL   --    --   6.9  7.2  7.4  7.5   < >  7.3  7.4  8.1   ALKPHOS   --    --   58  59  65  55   < >  62  62  66   AST   --    --   40  28  25  20   < >  34  21  22   ALT   --    --   32  35  33  31   < >  36  21  31    < > = values in this interval not displayed.     Calcium/VitaminD  Recent Labs   Lab Test  01/22/18   1018  11/25/17   1007  11/01/17   0831   FATOUMATA  9.3  9.8  9.8     ESR/CRP  Recent Labs   Lab Test  11/10/17   0925  08/18/17   0934  05/11/17   0931   SED  23  18  27   CRP  <2.9  <2.9  <2.9     TSH/T4  Recent Labs   Lab Test  12/07/17   1157  08/30/17   1214  02/16/17   0949   08/01/16   0845  04/08/15   1148   TSH  0.89  0.77  0.93   < >  0.64  0.60   T4  1.30   --    --    --   1.19  1.32    < > = values in this interval not displayed.     Hepatitis B  Recent Labs   Lab Test  04/05/16   1036   HBCAB  Nonreactive   HEPBANG  Nonreactive     Hepatitis C  Recent Labs   Lab Test  04/05/16    1036   HCVAB  Nonreactive   Assay performance characteristics have not been established for newborns,   infants, and children       Tuberculosis Screening  Recent Labs   Lab Test  02/21/17   1148  04/05/16   1037   TBRSLT  Negative  Negative   TBAGN  0.19  0.03     HIV Screening  Recent Labs   Lab Test  04/05/16   1036   HIAGAB  Nonreactive   HIV-1 p24 Ag & HIV-1/HIV-2 Ab Not Detected       Immunization History     Immunization History   Administered Date(s) Administered     Influenza (High Dose) 3 valent vaccine 09/07/2012, 10/08/2013, 09/26/2014, 10/09/2015, 09/21/2016, 10/13/2017     Influenza (IIV3) PF 08/29/2011     Pneumo Conj 13-V (2010&after) 04/05/2016     Pneumococcal 23 valent 05/01/2001, 10/04/2010     TD (ADULT, 7+) 07/01/2008          Chart documentation done in part with Dragon Voice recognition Software. Although reviewed after completion, some word and grammatical error may remain.    Mario Davenport MD

## 2018-02-06 NOTE — NURSING NOTE
"Chief Complaint   Patient presents with     Arthritis     RA, patient states she is doing good just has a runny nose.        Initial /70  Pulse 65  Ht 1.626 m (5' 4\")  Wt 74 kg (163 lb 3.2 oz)  SpO2 99%  BMI 28.01 kg/m2 Estimated body mass index is 28.01 kg/(m^2) as calculated from the following:    Height as of this encounter: 1.626 m (5' 4\").    Weight as of this encounter: 74 kg (163 lb 3.2 oz).  BP completed using cuff size: regular         RAPID3 (0-30) Cumulative Score            RAPID3 Weighted Score (divide #4 by 3 and that is the weighted score)           "

## 2018-02-06 NOTE — TELEPHONE ENCOUNTER
Called and spoke to patient. She will continue on the Eliquis for now. She has a supply left from her last refill which she will take and will pay for the Eliquis out of pocket. She will check with her representative who sold her the insurance on how to monitor her deductible.

## 2018-02-06 NOTE — MR AVS SNAPSHOT
After Visit Summary   2018    Linda Spicer    MRN: [id number]           Patient Information     Date Of Birth          1931        Visit Information        Provider Department      2018 9:20 AM Mario Davenport MD [facility]        Today's Diagnoses     Rheumatoid arthritis involving multiple sites with positive rheumatoid factor (H)    -  1    High risk medication use          Care Instructions    Dr. Davenport s Rheumatology Clinics  Locations Clinic Hours Telephone Number     [facility]  [address]  [place] [address]     Wednesday: 7:20AM - 4:00PM  Thursday:     7:20AM - 4:00PM     Friday:          7:20AM - 11:00AM       To schedule an appointment with  Dr. Davenport,  please contact  Specialty [phone]       [place] [facility]  [address]  [place] [address]       Monday:       7:20AM - 4:00PM        [facility]  [address] [place] [address]       Tuesday:      7:20AM - 4:00PM          Thank you!    Nicole Will CMA              Follow-ups after your visit        Follow-up notes from your care team     Return in about 3 months (around 2018).      Your next 10 appointments already scheduled     2018 10:00 AM CST   Level 2 with Charlotte 1 [facility] ([facility])    [address] [place] [id number] [phone]            2018  2:30 PM CST   Office Visit with PFT LAB   [facility])    [address] [place] [phone]           Bring a current list of meds and any records pertaining to this visit. For Physicals, please bring immunization records and any forms needing to be filled out. Please arrive 10 minutes early to complete paperwork.            Mar 07, 2018 10:00 AM CST   Level 2 with Charlotte 9 [facility]  [facility])    [address] [place] [address]   [phone]            Mar 07, 2018  1:40 PM CST   Nurse Only with BK ANCILLARY   [facility] ([facility])    [address] [facility] [address]   [phone]            Mar 19, 2018  1:10 PM CDT   Return Visit with Emeterio Loza MD   [facility] ([facility])    [address] [place] [address]   [phone]            Apr 04, 2018  4:00 PM CDT   Return Visit with DEVICE CHECK RN CARD   [facility] ([facility])    [address] [place] [address]   [phone]            Apr 23, 2018 11:00 AM CDT   New Visit with Tomi Peña MD   [facility] ([facility])    [address] [place] [address]   [phone]            May 08, 2018 10:20 AM CDT   Return Visit with Mario Davenport MD   [facility] ([facility])    [address] [facility] [address]   [phone]            Jim 15, 2019  9:30 AM CST   Return Visit with Sandra Comer MD   [facility])    [address] [place] [address]   [phone]              Future tests that were ordered for you today     Open Standing Orders        Priority Remaining Interval Expires Ordered    CBC with platelets differential Routine 4/4 Every 3 Months 2/1/2019 2/6/2018    Creatinine Routine 4/4 Every 3 Months 2/1/2019 2/6/2018    Hepatic panel Routine 4/4 Every 3 Months 2/1/2019 2/6/2018            Who to contact     If you have questions or need follow up information about today's clinic visit or your schedule please contact [facility] directly at [phone].  Normal or non-critical lab and imaging results will be communicated to you by  "MyChart, letter or phone within 4 business days after the clinic has received the results. If you do not hear from us within 7 days, please contact the clinic through [employer] or phone. If you have a critical or abnormal lab result, we will notify you by phone as soon as possible.  Submit refill requests through [employer] [employer] or call your pharmacy and they will forward the refill request to us. Please allow 3 business days for your refill to be completed.          Additional Information About Your Visit        [employer] Information     [facility] [employer] gives you secure access to your electronic health record. If you see a primary care provider, you can also send messages to your care team and make appointments. If you have questions, please call your primary care clinic.  If you do not have a primary care provider, please call [phone] and they will assist you.        Care EveryWhere ID     This is your Care EveryWhere ID. This could be used by other organizations to access your [facility] medical records  [id number]        Your Vitals Were     Pulse Height Pulse Oximetry BMI (Body Mass Index)          65 1.626 m (5' 4\") 99% 28.01 kg/m2         Blood Pressure from Last 3 Encounters:   02/06/18 116/70   01/22/18 129/66   01/16/18 109/60    Weight from Last 3 Encounters:   02/06/18 74 kg (163 lb 3.2 oz)   01/22/18 74.4 kg (164 lb)   01/16/18 74.9 kg (165 lb 1.6 oz)               Primary Care Provider Office Phone # Fax #    Tre Lockett [phone]       [address]  [facility] [address]        Equal Access to Services     [facility]: Hadii aad ku hadasho Soomaali, waaxda luqadaha, qaybta kaalmada ana, zahraa skinner. So [facility] [phone].    ATENCIÓN: Si habla español, tiene a merchant disposición servicios gratuitos de asistencia lingüística. Alin al [phone].    We comply with applicable federal civil rights laws and [place] laws. We do not discriminate on the " basis of race, color, national origin, age, disability, sex, sexual orientation, or gender identity.            Thank you!     Thank you for choosing [facility]  for your care. Our goal is always to provide you with excellent care. Hearing back from our patients is one way we can continue to improve our services. Please take a few minutes to complete the written survey that you may receive in the mail after your visit with us. Thank you!             Your Updated Medication List - Protect others around you: Learn how to safely use, store and throw away your medicines at www.disposemymeds.org.          This list is accurate as of 2/6/18 10:16 AM.  Always use your most recent med list.                   Brand Name Dispense Instructions for use Diagnosis    apixaban ANTICOAGULANT 2.5 MG tablet    ELIQUIS    180 tablet    Take 1 tablet (2.5 mg) by mouth 2 times daily    Atrial flutter, paroxysmal (H)       azaTHIOprine 50 MG tablet    IMURAN    90 tablet    Take 1 tablet (50 mg) by mouth daily    Rheumatoid arthritis involving multiple sites with positive rheumatoid factor (H)       bismuth subsalicylate 262 MG chewable tablet    PEPTO BISMOL    100 tablet    Take 2 tablets (524 mg) by mouth 4 times daily as needed    Dyspepsia and disorder of function of stomach       Blood Pressure Monitor Kit     1 kit    1 kit daily as needed    CHF (congestive heart failure) (H)       bumetanide 1 MG tablet    BUMEX    30 tablet    Take 1 tablet (1 mg) by mouth every morning    Chronic diastolic congestive heart failure (H)       calcium-vitamin D 600-400 MG-UNIT per tablet    CALTRATE     Take 1 tablet by mouth 2 times daily        Cranberry 180 MG Caps      Take 1 capsule by mouth daily Unsure of strength        fish oil-omega-3 fatty acids 1000 MG capsule      Take 2 g by mouth daily. 2 capsules daily        FLAGYL PO      Take 500 mg by mouth 2 times daily    Acute infectious diarrhea       folic acid 1 MG  tablet    FOLVITE    90 tablet    Take 1 tablet (1 mg) by mouth daily    Oral aphthae       GENTEAL MILD OP      Apply  to eye daily.        hydrocortisone 2.5 % cream     30 g    Apply BID to affected region(s) for 7-10 days.    Rash       levothyroxine 75 MCG tablet    SYNTHROID/LEVOTHROID    90 tablet    TAKE ONE TABLET BY MOUTH EVERY DAY    Hypothyroidism, unspecified type       loratadine 10 MG tablet    CLARITIN     Take 10 mg by mouth every morning        LOSARTAN POTASSIUM PO      Take 25 mg by mouth every evening    Paroxysmal atrial fibrillation (H)       metoprolol tartrate 25 MG tablet    LOPRESSOR    60 tablet    Take 1 tablet (25 mg) by mouth 2 times daily    Atrial fibrillation, unspecified type (H), Hypertension goal BP (blood pressure) < 150/90       nitroGLYcerin 0.4 MG sublingual tablet    NITROSTAT    25 tablet    Place 1 tablet (0.4 mg) under the tongue every 5 minutes as needed for chest pain    Chest pain       * order for DME     1 Box    Equipment being ordered: Dispense face mask. Mrs. Spicer is immunosuppressed due to rheumatoid arthritis.    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 Use with Albuterol.    Hypoxia       order for DME     1 each    Equipment being ordered: Dispense baffle, for use with nebulizer.    Pneumonia of left upper lobe due to Mycoplasma pneumoniae       * order for DME     1 each    Dispense SP Walker-small    Pain of toe of right foot, Status post bunionectomy       PRESERVISION AREDS PO      Take 1 tablet by mouth daily        ranibizumab 0.3 MG/0.05ML Soln    LUCENTIS     0.3 mg by Intravitreal route        ranitidine 150 MG tablet    ZANTAC    60 tablet    Take 1 tablet (150 mg) by mouth 2 times daily    Gastroesophageal reflux disease without esophagitis       REFRESH P.M. Oint      Apply  to eye. Daily at bedtime        senna-docusate 8.6-50 MG per  tablet    SENOKOT-S;PERICOLACE    120 tablet    Take 2 tablets by mouth At Bedtime Hold if diarrhea occurs.    Constipation, chronic       triamcinolone 0.1 % cream    KENALOG    453.6 g    Apply sparingly to affected area on face three times daily.    Facial lesion       ZYRTEC ALLERGY 10 MG tablet   Generic drug:  cetirizine      Take 10 mg by mouth At Bedtime        * Notice:  This list has 3 medication(s) that are the same as other medications prescribed for you. Read the directions carefully, and ask your doctor or other care provider to review them with you.

## 2018-02-07 ENCOUNTER — DOCUMENTATION ONLY (OUTPATIENT)
Dept: SPIRITUAL SERVICES | Facility: CLINIC | Age: 83
End: 2018-02-07

## 2018-02-07 ENCOUNTER — INFUSION THERAPY VISIT (OUTPATIENT)
Dept: INFUSION THERAPY | Facility: CLINIC | Age: 83
End: 2018-02-07
Payer: MEDICARE

## 2018-02-07 VITALS
BODY MASS INDEX: 27.98 KG/M2 | OXYGEN SATURATION: 98 % | WEIGHT: 163 LBS | DIASTOLIC BLOOD PRESSURE: 64 MMHG | HEART RATE: 64 BPM | TEMPERATURE: 97.9 F | SYSTOLIC BLOOD PRESSURE: 143 MMHG | RESPIRATION RATE: 20 BRPM

## 2018-02-07 DIAGNOSIS — Z79.899 HIGH RISK MEDICATION USE: ICD-10-CM

## 2018-02-07 DIAGNOSIS — Z71.81 SPIRITUAL OR RELIGIOUS COUNSELING: Primary | ICD-10-CM

## 2018-02-07 DIAGNOSIS — M05.79 RHEUMATOID ARTHRITIS INVOLVING MULTIPLE SITES WITH POSITIVE RHEUMATOID FACTOR (H): Primary | ICD-10-CM

## 2018-02-07 DIAGNOSIS — M05.79 RHEUMATOID ARTHRITIS INVOLVING MULTIPLE SITES WITH POSITIVE RHEUMATOID FACTOR (H): ICD-10-CM

## 2018-02-07 LAB
ALBUMIN SERPL-MCNC: 3.5 G/DL (ref 3.4–5)
ALP SERPL-CCNC: 91 U/L (ref 40–150)
ALT SERPL W P-5'-P-CCNC: 35 U/L (ref 0–50)
AST SERPL W P-5'-P-CCNC: 30 U/L (ref 0–45)
BASOPHILS # BLD AUTO: 0 10E9/L (ref 0–0.2)
BASOPHILS NFR BLD AUTO: 0.3 %
BILIRUB DIRECT SERPL-MCNC: 0.1 MG/DL (ref 0–0.2)
BILIRUB SERPL-MCNC: 0.5 MG/DL (ref 0.2–1.3)
CREAT SERPL-MCNC: 1.43 MG/DL (ref 0.52–1.04)
CRP SERPL-MCNC: 3.7 MG/L (ref 0–8)
DIFFERENTIAL METHOD BLD: ABNORMAL
EOSINOPHIL # BLD AUTO: 0.1 10E9/L (ref 0–0.7)
EOSINOPHIL NFR BLD AUTO: 2.2 %
ERYTHROCYTE [DISTWIDTH] IN BLOOD BY AUTOMATED COUNT: 12.5 % (ref 10–15)
ERYTHROCYTE [SEDIMENTATION RATE] IN BLOOD BY WESTERGREN METHOD: 34 MM/H (ref 0–30)
GFR SERPL CREATININE-BSD FRML MDRD: 35 ML/MIN/1.7M2
HCT VFR BLD AUTO: 35.5 % (ref 35–47)
HGB BLD-MCNC: 11.6 G/DL (ref 11.7–15.7)
IMM GRANULOCYTES # BLD: 0 10E9/L (ref 0–0.4)
IMM GRANULOCYTES NFR BLD: 0.2 %
LYMPHOCYTES # BLD AUTO: 1.6 10E9/L (ref 0.8–5.3)
LYMPHOCYTES NFR BLD AUTO: 25.4 %
MCH RBC QN AUTO: 32 PG (ref 26.5–33)
MCHC RBC AUTO-ENTMCNC: 32.7 G/DL (ref 31.5–36.5)
MCV RBC AUTO: 98 FL (ref 78–100)
MONOCYTES # BLD AUTO: 0.7 10E9/L (ref 0–1.3)
MONOCYTES NFR BLD AUTO: 10.7 %
NEUTROPHILS # BLD AUTO: 3.9 10E9/L (ref 1.6–8.3)
NEUTROPHILS NFR BLD AUTO: 61.2 %
PLATELET # BLD AUTO: 256 10E9/L (ref 150–450)
PROT SERPL-MCNC: 7.4 G/DL (ref 6.8–8.8)
RBC # BLD AUTO: 3.62 10E12/L (ref 3.8–5.2)
WBC # BLD AUTO: 6.3 10E9/L (ref 4–11)

## 2018-02-07 PROCEDURE — 82565 ASSAY OF CREATININE: CPT | Performed by: INTERNAL MEDICINE

## 2018-02-07 PROCEDURE — 36415 COLL VENOUS BLD VENIPUNCTURE: CPT | Performed by: INTERNAL MEDICINE

## 2018-02-07 PROCEDURE — 85025 COMPLETE CBC W/AUTO DIFF WBC: CPT | Performed by: INTERNAL MEDICINE

## 2018-02-07 PROCEDURE — 96365 THER/PROPH/DIAG IV INF INIT: CPT | Performed by: INTERNAL MEDICINE

## 2018-02-07 PROCEDURE — 86140 C-REACTIVE PROTEIN: CPT | Performed by: INTERNAL MEDICINE

## 2018-02-07 PROCEDURE — 85652 RBC SED RATE AUTOMATED: CPT | Performed by: INTERNAL MEDICINE

## 2018-02-07 PROCEDURE — 80076 HEPATIC FUNCTION PANEL: CPT | Performed by: INTERNAL MEDICINE

## 2018-02-07 PROCEDURE — 99207 ZZC NO CHARGE LOS: CPT

## 2018-02-07 RX ADMIN — Medication 250 ML: at 10:31

## 2018-02-07 ASSESSMENT — PAIN SCALES - GENERAL: PAINLEVEL: NO PAIN (0)

## 2018-02-07 NOTE — MR AVS SNAPSHOT
After Visit Summary   2/7/2018    Linda Spicer    MRN: [id number]           Patient Information     Date Of Birth          6/13/1931        Visit Information        Provider Department      2/7/2018 10:00 AM Bailey 1 [facility]        Today's Diagnoses     Rheumatoid arthritis involving multiple sites with positive rheumatoid factor (H)    -  1       Follow-ups after your visit        Your next 10 appointments already scheduled     Feb 14, 2018  2:30 PM CST   Office Visit with PFT LAB   [facility]    [address] [place] [address]   [phone]           Bring a current list of meds and any records pertaining to this visit. For Physicals, please bring immunization records and any forms needing to be filled out. Please arrive 10 minutes early to complete paperwork.            Mar 07, 2018 10:00 AM CST   Level 2 with BAY 9 INFUSION   [facility] ([facility])    [address] [place] [address]   [phone]            Mar 07, 2018  1:40 PM CST   Nurse Only with BK ANCILLARY   [facility] ([facility])    [address] [place] [address]   [phone]            Mar 19, 2018  1:10 PM CDT   Return Visit with Emeterio Loza MD   [facility] ([facility])    [address] [place] [address]   [phone]            Apr 04, 2018  2:30 PM CDT   Level 2 with Bailey 6 INFUSION   [facility])    [address] [place] [address]   [phone]            Apr 04, 2018  4:00 PM CDT   Return Visit with DEVICE CHECK RN CARD   [facility])    [address] [place] [address]   [phone]            Apr 23, 2018 11:00 AM CDT   New Visit with Tomi  MD Casey   [facility] ([facility])    [address] [place] [address]   [phone]            May 08, 2018 10:20 AM CDT   Return Visit with Mario Davenport MD   [facility] ([facility])    [address] [facility] [address]   [phone]            Jim 15, 2019  9:30 AM CST   Return Visit with Sandra Comer MD   [facility] ([facility])    [address] [place] [address]   [phone]              Future tests that were ordered for you today     Open Standing Orders        Priority Remaining Interval Expires Ordered    CBC with platelets differential Routine 4/4 Every 3 Months 2/1/2019 2/6/2018    Creatinine Routine 4/4 Every 3 Months 2/1/2019 2/6/2018    Hepatic panel Routine 4/4 Every 3 Months 2/1/2019 2/6/2018            Who to contact     If you have questions or need follow up information about today's clinic visit or your schedule please contact [facility] directly at [phone].  Normal or non-critical lab and imaging results will be communicated to you by [employer], letter or phone within 4 business days after the clinic has received the results. If you do not hear from us within 7 days, please contact the clinic through [employer] or phone. If you have a critical or abnormal lab result, we will notify you by phone as soon as possible.  Submit refill requests through [employer] or call your pharmacy and they will forward the refill request to us. Please allow 3 business days for your refill to be completed.          Additional Information About Your Visit        [employer] Information     [employer] gives you secure access to your electronic health record. If you see a primary care provider, you can also send messages to your care team and make appointments. If you have questions, please call your primary care clinic.  If  you do not have a primary care provider, please call [phone] and they will assist you.      [employer] is an electronic gateway that provides easy, online access to your medical records. With [employer], you can request a clinic appointment, read your test results, renew a prescription or communicate with your care team.     To access your existing account, please contact your [facility] or call [phone] for assistance.        Care EveryWhere ID     This is your Care EveryWhere ID. This could be used by other organizations to access your [facility] medical records  [id number]        Your Vitals Were     Pulse Temperature Respirations Pulse Oximetry BMI (Body Mass Index)       64 97.9  F (36.6  C) (Oral) 20 98% 27.98 kg/m2        Blood Pressure from Last 3 Encounters:   02/07/18 143/64   02/06/18 116/70   01/22/18 129/66    Weight from Last 3 Encounters:   02/07/18 73.9 kg (163 lb)   02/06/18 74 kg (163 lb 3.2 oz)   01/22/18 74.4 kg (164 lb)              Today, you had the following     No orders found for display       Primary Care Provider Office Phone # Fax #    Tre Lockett [phone]       [address] [facility] [address]        Equal Access to Services     [facility] : Hadii aad ku hadasho Soomaali, waaxda luqadaha, qaybta kaalmada adeegyada, waxay marisabel hayaan juventino kharnulfo moss . So [facility] [phone].    ATENCIÓN: Si habla español, tiene a merchant disposición servicios gratuitos de asistencia lingüística. Llame al [phone].    We comply with applicable federal civil rights laws and [place] laws. We do not discriminate on the basis of race, color, national origin, age, disability, sex, sexual orientation, or gender identity.            Thank you!     Thank you for choosing [facility]  for your care. Our goal is always to provide you with excellent care. Hearing back from our patients is one way we can continue to  improve our services. Please take a few minutes to complete the written survey that you may receive in the mail after your visit with us. Thank you!             Your Updated Medication List - Protect others around you: Learn how to safely use, store and throw away your medicines at www.disposemymeds.org.          This list is accurate as of 2/7/18  3:03 PM.  Always use your most recent med list.                   Brand Name Dispense Instructions for use Diagnosis    apixaban ANTICOAGULANT 2.5 MG tablet    ELIQUIS    180 tablet    Take 1 tablet (2.5 mg) by mouth 2 times daily    Atrial flutter, paroxysmal (H)       azaTHIOprine 50 MG tablet    IMURAN    90 tablet    Take 1 tablet (50 mg) by mouth daily    Rheumatoid arthritis involving multiple sites with positive rheumatoid factor (H)       bismuth subsalicylate 262 MG chewable tablet    PEPTO BISMOL    100 tablet    Take 2 tablets (524 mg) by mouth 4 times daily as needed    Dyspepsia and disorder of function of stomach       Blood Pressure Monitor Kit     1 kit    1 kit daily as needed    CHF (congestive heart failure) (H)       bumetanide 1 MG tablet    BUMEX    30 tablet    Take 1 tablet (1 mg) by mouth every morning    Chronic diastolic congestive heart failure (H)       calcium-vitamin D 600-400 MG-UNIT per tablet    CALTRATE     Take 1 tablet by mouth 2 times daily        Cranberry 180 MG Caps      Take 1 capsule by mouth daily Unsure of strength        fish oil-omega-3 fatty acids 1000 MG capsule      Take 2 g by mouth daily. 2 capsules daily        FLAGYL PO      Take 500 mg by mouth 2 times daily    Acute infectious diarrhea       folic acid 1 MG tablet    FOLVITE    90 tablet    Take 1 tablet (1 mg) by mouth daily    Oral aphthae       GENTEAL MILD OP      Apply  to eye daily.        hydrocortisone 2.5 % cream     30 g    Apply BID to affected region(s) for 7-10 days.    Rash       levothyroxine 75 MCG tablet    SYNTHROID/LEVOTHROID    90 tablet    TAKE  ONE TABLET BY MOUTH EVERY DAY    Hypothyroidism, unspecified type       loratadine 10 MG tablet    CLARITIN     Take 10 mg by mouth every morning        LOSARTAN POTASSIUM PO      Take 25 mg by mouth every evening    Paroxysmal atrial fibrillation (H)       metoprolol tartrate 25 MG tablet    LOPRESSOR    60 tablet    Take 1 tablet (25 mg) by mouth 2 times daily    Atrial fibrillation, unspecified type (H), Hypertension goal BP (blood pressure) < 150/90       nitroGLYcerin 0.4 MG sublingual tablet    NITROSTAT    25 tablet    Place 1 tablet (0.4 mg) under the tongue every 5 minutes as needed for chest pain    Chest pain       * order for DME     1 Box    Equipment being ordered: Dispense face mask. Mrs. Spicer is immunosuppressed due to rheumatoid arthritis.    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 Use with Albuterol.    Hypoxia       order for DME     1 each    Equipment being ordered: Dispense baffle, for use with nebulizer.    Pneumonia of left upper lobe due to Mycoplasma pneumoniae       * order for DME     1 each    Dispense SP Walker-small    Pain of toe of right foot, Status post bunionectomy       PRESERVISION AREDS PO      Take 1 tablet by mouth daily        ranibizumab 0.3 MG/0.05ML Soln    LUCENTIS     0.3 mg by Intravitreal route        ranitidine 150 MG tablet    ZANTAC    60 tablet    Take 1 tablet (150 mg) by mouth 2 times daily    Gastroesophageal reflux disease without esophagitis       REFRESH P.M. Oint      Apply  to eye. Daily at bedtime        senna-docusate 8.6-50 MG per tablet    SENOKOT-S;PERICOLACE    120 tablet    Take 2 tablets by mouth At Bedtime Hold if diarrhea occurs.    Constipation, chronic       triamcinolone 0.1 % cream    KENALOG    453.6 g    Apply sparingly to affected area on face three times daily.    Facial lesion       ZYRTEC ALLERGY 10 MG tablet   Generic drug:   cetirizine      Take 10 mg by mouth At Bedtime        * Notice:  This list has 3 medication(s) that are the same as other medications prescribed for you. Read the directions carefully, and ask your doctor or other care provider to review them with you.

## 2018-02-07 NOTE — PROGRESS NOTES
"Infusion Nursing Note:  Linda M Esteban presents today for Orencia.    Patient seen by provider today: No   present during visit today: Not Applicable.    Note: N/A.    Intravenous Access:  Peripheral IV placed.    Treatment Conditions:  Rheumatology Infusion Checklist: PRIOR TO INFUSION OF BIOLOGICAL MEDICATIONS OR ANY OF THESE AS LISTED: Orencia (abatacept) \".rheumbiologicalchecklist\"    Prior to Infusion of biological medications or any of these as listed:    1. Elevated temperature, fever, chills, productive cough or abnormal vital signs, night sweats, coughing up blood or sputum, no appetite or abnormal vital signs : NO    2. Open wounds or new incisions: NO    3. Recent hospitalization: NO    4.  Recent surgeries:  NO    5. Any upcoming surgeries or dental procedures?:NO    6. Any current or recent bouts of illness or infection? On any antibiotics? : NO    7. Any new, sudden or worsening abdominal pain :NO    8. Vaccination within 4 weeks? Patient or someone in the household is scheduled to receive vaccination? No live virus vaccines prior to or during treatment :NO    9. Any nervous system diseases [i.e. multiple sclerosis, Guillain-[place], seizures, neurological  changes]: NO    10. Pregnant or breast feeding; or plans on pregnancy in the future: NO    11. Signs of worsening depression or suicidal ideations while taking benlysta:NO    12. New-onset medical symptoms: NO    13.  New cancer diagnosis or on chemotherapy or radiation NO    14.  Evaluate for any sign of active TB [Unexplained weight loss, Loss of appetite, Night sweats, Fever, Fatigue, Chills, Coughing for 3 weeks or longer, Hemoptysis (coughing up blood), Chest pain]: NO    **Note: If answered yes to any of the above, hold the infusion and contact ordering rheumatologist or on-call rheumatologist.     Post Infusion Assessment:  Patient tolerated infusion without incident.  Blood return noted pre and post infusion.  Site patent and " intact, free from redness, edema or discomfort.  Access discontinued per protocol.  Rheumatology Post Infusion: POST-INFUSION OF BIOLOGICAL MEDICATION:    Reviewed with patient.  Given biologic medication or medication hand-out. Inform patient if any fever, chills or signs of infection, new symptoms, abdominal pain, heart palpitations, shortness of breath, reaction, weakness, neurological changes, seek medical attention immediately and should not receive infusions. No live virus vaccines prior to or during treatment or up to 6 months post infusion. If the patient has an upcoming procedure or surgery, this should be discussed with the rheumatologist and surgeon or provider.    Discharge Plan:   Patient discharged in stable condition accompanied by: son.  Departure Mode: Ambulatory.    Trinity Villalpando RN

## 2018-02-07 NOTE — PROGRESS NOTES
SPIRITUAL HEALTH SERVICES  SPIRITUAL ASSESSMENT Progress Note  [facility]    PRIMARY FOCUS:     Support for coping    ILLNESS CIRCUMSTANCES:   Reviewed documentation. Reflective conversation shared with Linda Spicer & her son, Arjun, which integrated elements of illness and family narratives.     Context of Serious Illness/Symptom(s) - Cancer    Resources for Support - , son, daughter, Caodaism stacey, [facility].    DISTRESS:     Emotional/Spiritual/Existential Distress - Pt expressed concern for her  who is coping with a number of health issues.    Catholic Distress - Not Applicable    Social/Cultural/Economic Distress - Not discussed    SPIRITUAL/Gnosticist COPING:     Buddhist/Stacey - Caodaism    Spiritual Practice(s) - Islam, Prayer   provided prayer, communion, and a prayer shawl.    Emotional/Relational/Existential Connections - Pt mentioned how important the support of her son and daughter were to her and her .    GOALS OF CARE:    Goals of Care - Address the cancer.    Meaning/Sense-Making - Pt's Caodaism stacey is central to her meaning-making.    PLAN:  is available for pt/family needs.    Daniel Kessler M.Div., [facility]  Staff   Office tel: [phone]

## 2018-02-10 ENCOUNTER — MYC MEDICAL ADVICE (OUTPATIENT)
Dept: RHEUMATOLOGY | Facility: CLINIC | Age: 83
End: 2018-02-10

## 2018-02-12 NOTE — TELEPHONE ENCOUNTER
RF/CCP  Recent Labs   Lab Test  04/05/16   1036   CCPIGG  52*   RHF  99*     CBC  Recent Labs   Lab Test  02/07/18   1008  11/27/17   1319  11/25/17   1007   WBC  6.3  7.3  6.1   RBC  3.62*  3.56*  3.42*   HGB  11.6*  11.8  11.3*   HCT  35.5  35.9  34.4*   MCV  98  101*  101*   RDW  12.5  12.5  12.2   PLT  256  271  242   MCH  32.0  33.1*  33.0   MCHC  32.7  32.9  32.8   NEUTROPHIL  61.2  59.6  59.9   LYMPH  25.4  28.1  26.9   MONOCYTE  10.7  9.9  10.7   EOSINOPHIL  2.2  2.1  2.3   BASOPHIL  0.3  0.3  0.2   ANEU  3.9  4.4  3.6   ALYM  1.6  2.1  1.6   YEHUDA  0.7  0.7  0.7   AEOS  0.1  0.2  0.1   ABAS  0.0  0.0  0.0     CMP  Recent Labs   Lab Test  02/07/18   1008  01/22/18   1018  12/07/17   1157  11/25/17   1007  11/10/17   0925  11/01/17   0831   NA   --   139   --   138   --   139   POTASSIUM   --   4.3   --   5.0   --   4.3   CHLORIDE   --   103   --   99   --   105   CO2   --   27   --   32   --   31   ANIONGAP   --   9   --   7   --   3   GLC   --   91   --   86   --   97   BUN   --   31*   --   33*   --   42*   CR  1.43*  1.26*  1.26*  1.60*  1.23*  1.41*   GFRESTIMATED  35*  40*  40*  31*  41*  35*   GFRESTBLACK  42*  49*  49*  37*  50*  43*   FATOUMATA   --   9.3   --   9.8   --   9.8   BILITOTAL  0.5   --    --   0.6  0.5  0.5   ALBUMIN  3.5   --    --   3.3*  3.5  3.6   PROTTOTAL  7.4   --    --   6.9  7.2  7.4   ALKPHOS  91   --    --   58  59  65   AST  30   --    --   40  28  25   ALT  35   --    --   32  35  33     Calcium/VitaminD  Recent Labs   Lab Test  01/22/18   1018  11/25/17   1007  11/01/17   0831   FATOUMATA  9.3  9.8  9.8     ESR/CRP  Recent Labs   Lab Test  02/07/18   1008  11/10/17   0925  08/18/17   0934   SED  34*  23  18   CRP  3.7  <2.9  <2.9     Lipid Panel  Recent Labs   Lab Test  08/30/17   1214  06/03/16   0756  05/22/14   0821   CHOL  146  171  155   TRIG  101  75  84   HDL  50  47*  42*   LDL  76  109*  97   VLDL   --    --   17   CHOLHDLRATIO   --    --   3.7   NHDL  96  124   --       Hepatitis B  Recent Labs   Lab Test  04/05/16   1036   HBCAB  Nonreactive   HEPBANG  Nonreactive     Hepatitis C  Recent Labs   Lab Test  04/05/16   1036   HCVAB  Nonreactive   Assay performance characteristics have not been established for newborns,   infants, and children       Tuberculosis Screening  Recent Labs   Lab Test  02/21/17   1148  04/05/16   1037   TBRSLT  Negative  Negative   TBAGN  0.19  0.03     HIV Screening  Recent Labs   Lab Test  04/05/16   1036   HIAGAB  Nonreactive   HIV-1 p24 Ag & HIV-1/HIV-2 Ab Not Detected

## 2018-02-12 NOTE — TELEPHONE ENCOUNTER
Received request for vaccination after infusion.  Will discuss with patient at future appointment or she may discuss with her PCP.    Mario Davenport MD  2/12/2018 4:43 PM

## 2018-02-13 ENCOUNTER — TELEPHONE (OUTPATIENT)
Dept: CARDIOLOGY | Facility: CLINIC | Age: 83
End: 2018-02-13

## 2018-02-13 ENCOUNTER — OFFICE VISIT (OUTPATIENT)
Dept: FAMILY MEDICINE | Facility: CLINIC | Age: 83
End: 2018-02-13
Payer: MEDICARE

## 2018-02-13 ENCOUNTER — CARE COORDINATION (OUTPATIENT)
Dept: CARE COORDINATION | Facility: CLINIC | Age: 83
End: 2018-02-13

## 2018-02-13 VITALS
HEART RATE: 66 BPM | SYSTOLIC BLOOD PRESSURE: 120 MMHG | DIASTOLIC BLOOD PRESSURE: 78 MMHG | BODY MASS INDEX: 27.91 KG/M2 | OXYGEN SATURATION: 97 % | TEMPERATURE: 97.9 F | WEIGHT: 162.6 LBS

## 2018-02-13 DIAGNOSIS — R30.0 DYSURIA: Primary | ICD-10-CM

## 2018-02-13 LAB
ALBUMIN UR-MCNC: NEGATIVE MG/DL
APPEARANCE UR: CLEAR
BILIRUB UR QL STRIP: NEGATIVE
COLOR UR AUTO: YELLOW
GLUCOSE UR STRIP-MCNC: NEGATIVE MG/DL
HGB UR QL STRIP: NEGATIVE
KETONES UR STRIP-MCNC: NEGATIVE MG/DL
LEUKOCYTE ESTERASE UR QL STRIP: NEGATIVE
NITRATE UR QL: NEGATIVE
PH UR STRIP: 7 PH (ref 5–7)
SOURCE: NORMAL
SP GR UR STRIP: <=1.005 (ref 1–1.03)
UROBILINOGEN UR STRIP-ACNC: 0.2 EU/DL (ref 0.2–1)

## 2018-02-13 PROCEDURE — 87088 URINE BACTERIA CULTURE: CPT | Performed by: NURSE PRACTITIONER

## 2018-02-13 PROCEDURE — 87086 URINE CULTURE/COLONY COUNT: CPT | Performed by: NURSE PRACTITIONER

## 2018-02-13 PROCEDURE — 81003 URINALYSIS AUTO W/O SCOPE: CPT | Performed by: NURSE PRACTITIONER

## 2018-02-13 PROCEDURE — 99213 OFFICE O/P EST LOW 20 MIN: CPT | Performed by: NURSE PRACTITIONER

## 2018-02-13 PROCEDURE — 87186 SC STD MICRODIL/AGAR DIL: CPT | Performed by: NURSE PRACTITIONER

## 2018-02-13 ASSESSMENT — PAIN SCALES - GENERAL: PAINLEVEL: MILD PAIN (2)

## 2018-02-13 NOTE — PROGRESS NOTES
SUBJECTIVE:   Linda Spicer is a 86 year old female who presents to clinic today for the following health issues:    URINARY TRACT SYMPTOMS  Onset: at 3am today    Description:   Painful urination (Dysuria): no   Blood in urine (Hematuria): no   Delay in urine (Hesitency): YES    Intensity: moderate    Progression of Symptoms:  same    Accompanying Signs & Symptoms:  Fever/chills: no   Flank pain no   Nausea and vomiting: no   Any vaginal symptoms: none  Abdominal/Pelvic Pain: YES- lower abdomen     History:   History of frequent UTI's: YES, Pt was last treated for UTI on 1/4/2018 at [facility]  History of kidney stones: no   Therapies Tried and outcome:Increase fluid intake    Very pleasant 86 year old female presents with concerns for urinary tract infection that started about 7 hours ago at 3am. She had dysuria, urgency, and frequency. Not really any symptoms now. No fever, sweats or chills. No nausea, vomiting, or diarrhea. No rash. Has intermittent low abdominal discomfort. Normal BMs-last this AM. No hematuria. No home interventions.    Problem list and histories reviewed & adjusted, as indicated.  Additional history: as documented    Patient Active Problem List   Diagnosis     ACP (advance care planning)     Hypertension goal BP (blood pressure) < 150/90     Hypothyroidism     Macular degeneration, left eye     Irritable bowel syndrome     Encounter for palliative care     Adjustment disorder with anxious mood     Mild anemia     DDD (degenerative disc disease), lumbar     CKD (chronic kidney disease) stage 3, GFR 30-59 ml/min     History of blood transfusion     Aftercare following surgery     S/P lumbar laminectomy     High risk medication use     Age-related osteoporosis without current pathological fracture     Atrophic vaginitis     Fecal incontinence     Female stress incontinence     Impingement syndrome of both shoulders     UIP (usual interstitial pneumonitis) (H)     High risk medications (not  anticoagulants) long-term use     Heart failure with preserved ejection fraction (H)     Congestive heart failure with preserved LV function, NYHA class 3 (H)     Other specified hypothyroidism     Rheumatoid arthritis involving multiple sites with positive rheumatoid factor (H)     Health Care Home     Sick sinus syndrome (H)     Cardiac pacemaker - Medtronic dual lead pacemaker - Not Dependent - MRI Safe     Immunosuppression (H)     ILD (interstitial lung disease) (H)     Past Surgical History:   Procedure Laterality Date     APPENDECTOMY       BIOPSY      hemorrhoidectomy     ENT SURGERY      tonsillectomy     GYN SURGERY      3 D & C's     HYSTERECTOMY, PAP NO LONGER INDICATED       LAMINECTOMY LUMBAR ONE LEVEL N/A 10/13/2015    Procedure: LAMINECTOMY LUMBAR ONE LEVEL;  Surgeon: Fransico Toussaint MD;  Location:  OR       Social History   Substance Use Topics     Smoking status: Never Smoker     Smokeless tobacco: Never Used     Alcohol use Yes      Comment: rare wine      Family History   Problem Relation Age of Onset     Hypertension Mother      Psychotic Disorder Father      DIABETES Son      DIABETES Daughter      Blood Disease Daughter          Current Outpatient Prescriptions   Medication Sig Dispense Refill     apixaban ANTICOAGULANT (ELIQUIS) 2.5 MG tablet Take 1 tablet (2.5 mg) by mouth 2 times daily 180 tablet 3     LOSARTAN POTASSIUM PO Take 25 mg by mouth every evening       loratadine (CLARITIN) 10 MG tablet Take 10 mg by mouth every morning       bumetanide (BUMEX) 1 MG tablet Take 1 tablet (1 mg) by mouth every morning 30 tablet 11     bismuth subsalicylate (PEPTO BISMOL) 262 MG chewable tablet Take 2 tablets (524 mg) by mouth 4 times daily as needed 100 tablet 11     metoprolol (LOPRESSOR) 25 MG tablet Take 1 tablet (25 mg) by mouth 2 times daily 60 tablet 5     folic acid (FOLVITE) 1 MG tablet Take 1 tablet (1 mg) by mouth daily 90 tablet 3     hydrocortisone 2.5 % cream Apply BID to  affected region(s) for 7-10 days. 30 g 0     levothyroxine (SYNTHROID/LEVOTHROID) 75 MCG tablet TAKE ONE TABLET BY MOUTH EVERY DAY 90 tablet 2     ranitidine (ZANTAC) 150 MG tablet Take 1 tablet (150 mg) by mouth 2 times daily 60 tablet 5     azaTHIOprine (IMURAN) 50 MG tablet Take 1 tablet (50 mg) by mouth daily 90 tablet 2     order for DME Dispense SP Walker-small 1 each 0     ranibizumab (LUCENTIS) 0.3 MG/0.05ML SOLN 0.3 mg by Intravitreal route       MetroNIDAZOLE (FLAGYL PO) Take 500 mg by mouth 2 times daily        senna-docusate (SENOKOT-S;PERICOLACE) 8.6-50 MG per tablet Take 2 tablets by mouth At Bedtime Hold if diarrhea occurs. 120 tablet 11     Cranberry 180 MG CAPS Take 1 capsule by mouth daily Unsure of strength       order for DME Equipment being ordered: Dispense baffle, for use with nebulizer. 1 each 0     order for DME Equipment being ordered: Nebulizer. Use with Albuterol. 1 each 0     order for DME Equipment being ordered: Dispense face mask.  Mrs. Spicer is immunosuppressed due to rheumatoid arthritis. 1 Box 11     triamcinolone (KENALOG) 0.1 % cream Apply sparingly to affected area on face three times daily. 453.6 g 5     Multiple Vitamins-Minerals (PRESERVISION AREDS PO) Take 1 tablet by mouth daily        Blood Pressure Monitor KIT 1 kit daily as needed 1 kit 0     nitroglycerin (NITROSTAT) 0.4 MG SL tablet Place 1 tablet (0.4 mg) under the tongue every 5 minutes as needed for chest pain 25 tablet 0     cetirizine (ZYRTEC ALLERGY) 10 MG tablet Take 10 mg by mouth At Bedtime       Hypromellose (GENTEAL MILD OP) Apply  to eye daily.       Artificial Tear Ointment (REFRESH P.M.) OINT Apply  to eye. Daily at bedtime          calcium-vitamin D (CALTRATE) 600-400 MG-UNIT per tablet Take 1 tablet by mouth 2 times daily        fish oil-omega-3 fatty acids (FISH OIL) 1000 MG capsule Take 2 g by mouth daily. 2 capsules daily            Allergies   Allergen Reactions     Cephalexin Hcl Diarrhea      Gabapentin Other (See Comments)     Dizzsiness     Naproxen GI Disturbance     Perfume      Lactase Other (See Comments)     Macrobid [Nitrofurantoin Anhydrous]      Possibly related to lung disease      Sulfa Drugs      Throat swelling     Xanax [Alprazolam] Other (See Comments)     Dizziness        Reviewed and updated as needed this visit by clinical staff  Tobacco  Allergies  Meds  Problems       Reviewed and updated as needed this visit by Provider  Allergies  Meds  Problems         ROS:  Constitutional, HEENT, cardiovascular, pulmonary, gi and gu systems are negative, except as otherwise noted.    OBJECTIVE:     /78 (BP Location: Left arm, Patient Position: Sitting, Cuff Size: Adult Regular)  Pulse 66  Temp 97.9  F (36.6  C) (Oral)  Wt 162 lb 9.6 oz (73.8 kg)  SpO2 97%  BMI 27.91 kg/m2  Body mass index is 27.91 kg/(m^2).  GENERAL: healthy, alert and no distress  NECK: no adenopathy, no asymmetry, masses, or scars and thyroid normal to palpation  RESP: lungs clear to auscultation - no rales, rhonchi or wheezes  CV: regular rate and rhythm, normal S1 S2, no S3 or S4, no murmur, click or rub, no peripheral edema and peripheral pulses strong  ABDOMEN: soft, nontender, no hepatosplenomegaly, no masses and bowel sounds normal. No CVA tenderness  MS: no gross musculoskeletal defects noted, no edema  SKIN: no suspicious lesions or rashes  PSYCH: mentation appears normal, affect normal/bright    Diagnostic Test Results:  Results for orders placed or performed in visit on 02/13/18 (from the past 24 hour(s))   *UA reflex to Microscopic and Culture ([place] and [facility]3] (except [place] and [place])   Result Value Ref Range    Color Urine Yellow     Appearance Urine Clear     Glucose Urine Negative NEG^Negative mg/dL    Bilirubin Urine Negative NEG^Negative    Ketones Urine Negative NEG^Negative mg/dL    Specific Gravity Urine <=1.005 1.003 - 1.035    Blood Urine Negative NEG^Negative    pH  Urine 7.0 5.0 - 7.0 pH    Protein Albumin Urine Negative NEG^Negative mg/dL    Urobilinogen Urine 0.2 0.2 - 1.0 EU/dL    Nitrite Urine Negative NEG^Negative    Leukocyte Esterase Urine Negative NEG^Negative    Source Midstream Urine        ASSESSMENT/PLAN:     1. Dysuria  Unremarkable exam and urinalysis. Urine culture pending. Further plan pending results of culture.  - *UA reflex to Microscopic and Culture ([place] and [facility] (except [place] and [place]); Future  - Urine Culture Aerobic Bacterial    See Patient Instructions    SUNNI Polanco CNP  [facility]

## 2018-02-13 NOTE — MR AVS SNAPSHOT
After Visit Summary   2/13/2018    Linda Spicer    MRN: [id number]           Patient Information     Date Of Birth          6/13/1931        Visit Information        Provider Department      2/13/2018 10:20 AM Estela Mccrary APRN CNP [facility]        Today's Diagnoses     Dysuria    -  1      Care Instructions    Urinalysis normal. Urine culture pending.  Drink lots of water  If you develop additional symptoms such as nausea, vomiting, abdominal pain, etc, please let us know and we'll help you decide the next best steps      At [facility], we strive to deliver an exceptional experience to you, every time we see you.  If you receive a survey in the mail, please send us back your thoughts. We really do value your feedback.    Suggested websites for health information:  Www.[facility].[employer] : Up to date and easily searchable information on multiple topics.  Www.[employer].gov : medication info, interactive tutorials, watch real surgeries online  Www.familydoctor.org : good info from the Academy of Family Physicians  Www.cdc.gov : public health info, travel advisories, epidemics (H1N1)  Www.aap.org : children's health info, normal development, vaccinations  Www.health.[facility].[place].us : [place] dept of health, public health issues in [place], N1N1    Your care team:                            Family Medicine Internal Medicine   MD Tre Hicks MD Shantel Branch-Fleming, MD Katya Georgiev PA-C Megan Hill, APRN CNP Nam Ho, MD Pediatrics   ABY Snyder, MD Maritza Tinsley CNP, MD Deborah Mielke, MD Kim Thein, APRN CNP      Clinic hours: Monday - Thursday 7 am-7 pm; Fridays 7 am-5 pm.   Urgent care: Monday - Friday 11 am-9 pm; Saturday and Sunday 9 am-5 pm.  Pharmacy : Monday -Thursday 8 am-8 pm; Friday 8 am-6 pm; Saturday and Sunday 9 am-5 pm.     Clinic: [phone]   Pharmacy:  [phone]      Dysuria     Painful urination (dysuria) is often caused by a problem in the urinary tract.   Dysuria is pain felt during urination. It is often described as a burning. Learn more about this problem and how it can be treated.  What causes dysuria?  Possible causes include:    Infection with a bacteria or virus such as a urinary tract infection (UTI or a sexually transmitted infection (STI)    Sensitivity or allergy to chemicals such as those found in lotions and other products    Prostate or bladder problems    Radiation therapy to the pelvic area  How is dysuria diagnosed?  Your healthcare provider will examine you. He or she will ask about your symptoms and health. After talking with you and doing a physical exam, your healthcare provider may know what is causing your dysuria. He or she will usually request  a sample of your urine. Tests of your urine, or a \"urinalysis,\" are done. A urinalysis may include:    Looking at the urine sample (visual exam)    Checking for substances (chemical exam)    Looking at a small amount under a microscope (microscopic exam)  Some parts of the urinalysis may be done in the provider's office and some in a lab. And, the urine sample may be checked for bacteria and yeast (urine culture). Your healthcare provider will tell you more about these tests if they are needed.  How is dysuria treated?  Treatment depends on the cause. If you have a bacterial infection, you may need antibiotics. You may be given medicines to make it easier for you to urinate and help relieve pain. Your healthcare provider can tell you more about your treatment options. Untreated, symptoms may get worse.  When to call your healthcare provider  Call the healthcare provider right away if you have any of the following:    Fever of 100.4 F (38 C) or higher     No improvement after three days of treatment    Trouble urinating because of pain    New or increased discharge from the vagina or " penis    Rash or joint pain    Increased back or abdominal pain    Enlarged painful lymph nodes (lumps) in the groin   Date Last Reviewed: 1/1/2017 2000-2017 [employer]. [address] [place] [address], [place] [address]. All rights reserved. This information is not intended as a substitute for professional medical care. Always follow your healthcare professional's instructions.        Dysuria with Uncertain Cause (Adult)    The urethra is the tube that allows urine to pass out of the body. In a woman, the urethra is the opening above the vagina. In men, the urethra is the opening on the tip of the penis. Dysuria is the feeling of pain or burning in the urethra when passing urine.  Dysuria can be caused by anything that irritates or inflames the urethra. An infection or chemical irritation can cause this reaction. A bladder infection is the most common cause of dysuria in adults. A urine test can diagnose this. A bladder infection needs antibiotic treatment.  Soaps, lotions, colognes and feminine hygiene products can cause dysuria. So can birth control jellies, creams, and foams. It will go away 1 to 3 days after using these irritants.  Sexually transmitted diseases (STDs) such as chlamydia or gonorrhea can cause dysuria. Your healthcare provider may take a culture sample. Your provider may start you on antibiotic medicine before the culture test returns.  In women who have gone through menopause, dysuria can be from dryness in the lining of the urethra. This can be treated with hormones. Dysuria becomes long-term (chronic) when it lasts for weeks or months. You may need to see a specialist (urologist) to diagnose and treat chronic dysuria.  Home care  These home care tips may help:    Don't use any chemicals or products that you think may be causing your symptoms.    If you were given a prescription medicine, take as directed. Be sure to take it until it is all used up.    If a culture was taken, don't  have sex until you have been told that it is negative. This means you don't have an infection. Then follow your healthcare provider's advice to treat your condition.  If a culture was done and it is positive:    Both you and your sexual partner may need to be treated. This is true even if your partner has no symptoms.    Contact your healthcare provider or go to an urgent care clinic or the public health department to be looked at and treated.    Don't have sex until both you and your partner(s) have finished all antibiotics and your healthcare provider says you are no longer contagious.    Learn about and use safe sex practices. The safest sex is with a partner who has tested negative and only has sex with you. Condoms can prevent STDs from spreading, but they aren't a guarantee.  Follow-up care  Follow up with your healthcare provider, or as advised. If a culture was taken, you may call as directed for the results. If you have an STD, follow up with your provider or the public health department for a complete STD screening, including HIV testing. For more information, contact CDC-INFO at 436-210-6744.  When to seek medical advice  Call your healthcare provider right away if any of these occur:    You aren't better after 3 days of treatment    Fever of 100.4 F (38 C) or higher, or as directed by your healthcare provider    Back or belly pain that gets worse    You can't urinate because of pain    New discharge from the urethra, vagina, or penis    Painful sores on the penis    Rash or joint pain    Painful lumps (lymph nodes) in the groin    Testicle pain or swelling of the scrotum  Date Last Reviewed: 11/1/2016 2000-2017 [employer]. [address] [place] [address], [place] [address]. All rights reserved. This information is not intended as a substitute for professional medical care. Always follow your healthcare professional's instructions.                Follow-ups after your visit        Your next 10  appointments already scheduled     Feb 14, 2018  2:30 PM CST   Office Visit with PFT LAB   [facility]    [address] [place] [address]   [phone]           Bring a current list of meds and any records pertaining to this visit. For Physicals, please bring immunization records and any forms needing to be filled out. Please arrive 10 minutes early to complete paperwork.            Mar 07, 2018 10:00 AM CST   Level 2 with BAY 9 INFUSION   [facility]    [address] [place] [address]   [phone]            Mar 07, 2018  1:40 PM CST   Nurse Only with BK ANCILLARY   [facility]    [address] [facility] [address]   [phone]            Mar 19, 2018  1:10 PM CDT   Return Visit with Emeterio Loza MD   [facility]    [address] [place] [address]   [phone]            Apr 04, 2018  2:30 PM CDT   Level 2 with BAY 6 INFUSION   [facility]    [address] [place] [address]   [phone]            Apr 04, 2018  4:00 PM CDT   Return Visit with DEVICE CHECK RN CARD   [facility])    [address] [place] [address]   [phone]            Apr 23, 2018 11:00 AM CDT   New Visit with Tomi Peña MD   [facility]    [address] [place] [address]   [phone]            May 08, 2018 10:20 AM CDT   [facility] Visit with Mario Davenport MD   [facility]    [address] [facility] [address]   [phone]            Jim 15, 2019  9:30 AM CST   Return Visit with Sandra Comer MD     [facility] ([facility])    [address] [place] [address] [phone]              Who to contact     If you have questions or need follow up information about today's clinic visit or your schedule please contact [facility] directly at [phone].  Normal or non-critical lab and imaging results will be communicated to you by MyChart, letter or phone within 4 business days after the clinic has received the results. If you do not hear from us within 7 days, please contact the clinic through [employer] or phone. If you have a critical or abnormal lab result, we will notify you by phone as soon as possible.  Submit refill requests through [employer] or call your pharmacy and they will forward the refill request to us. Please allow 3 business days for your refill to be completed.          Additional Information About Your Visit        [employer] Information     [employer] gives you secure access to your electronic health record. If you see a primary care provider, you can also send messages to your care team and make appointments. If you have questions, please call your primary care clinic.  If you do not have a primary care provider, please call [phone] and they will assist you.        Care EveryWhere ID     This is your Care EveryWhere ID. This could be used by other organizations to access your [facility] medical records  [id number]        Your Vitals Were     Pulse Temperature Pulse Oximetry BMI (Body Mass Index)          66 97.9  F (36.6  C) (Oral) 97% 27.91 kg/m2         Blood Pressure from Last 3 Encounters:   02/13/18 120/78   02/07/18 143/64   02/06/18 116/70    Weight from Last 3 Encounters:   02/13/18 162 lb 9.6 oz (73.8 kg)   02/07/18 163 lb (73.9 kg)   02/06/18 163 lb 3.2 oz (74 kg)              We Performed the Following     *UA reflex to Microscopic and Culture ([place] [facility] (except [place] and [place])     Urine Culture  Aerobic Bacterial        Primary Care Provider Office Phone # Fax #    Tre Lockett [phone] [address]  [facility] [address]        Equal Access to Services     [facility] : Hadii aad ku hadmyrao Sojasonali, waaxda luqadaha, qaybta kaalmada adeegyada, zahraa chazin hayaacapri harrisnicolas narayanaddy skinner. So [facility] [phone].    ATENCIÓN: Si habla español, tiene a merchant disposición servicios gratuitos de asistencia lingüística. Llame al [phone].    We comply with applicable federal civil rights laws and [place] laws. We do not discriminate on the basis of race, color, national origin, age, disability, sex, sexual orientation, or gender identity.            Thank you!     Thank you for choosing [facility]  for your care. Our goal is always to provide you with excellent care. Hearing back from our patients is one way we can continue to improve our services. Please take a few minutes to complete the written survey that you may receive in the mail after your visit with us. Thank you!             Your Updated Medication List - Protect others around you: Learn how to safely use, store and throw away your medicines at www.disposemymeds.org.          This list is accurate as of 2/13/18 10:42 AM.  Always use your most recent med list.                   Brand Name Dispense Instructions for use Diagnosis    apixaban ANTICOAGULANT 2.5 MG tablet    ELIQUIS    180 tablet    Take 1 tablet (2.5 mg) by mouth 2 times daily    Atrial flutter, paroxysmal (H)       azaTHIOprine 50 MG tablet    IMURAN    90 tablet    Take 1 tablet (50 mg) by mouth daily    Rheumatoid arthritis involving multiple sites with positive rheumatoid factor (H)       bismuth subsalicylate 262 MG chewable tablet    PEPTO BISMOL    100 tablet    Take 2 tablets (524 mg) by mouth 4 times daily as needed    Dyspepsia and disorder of function of stomach       Blood Pressure Monitor Kit     1 kit    1 kit daily as  needed    CHF (congestive heart failure) (H)       bumetanide 1 MG tablet    BUMEX    30 tablet    Take 1 tablet (1 mg) by mouth every morning    Chronic diastolic congestive heart failure (H)       calcium-vitamin D 600-400 MG-UNIT per tablet    CALTRATE     Take 1 tablet by mouth 2 times daily        Cranberry 180 MG Caps      Take 1 capsule by mouth daily Unsure of strength        fish oil-omega-3 fatty acids 1000 MG capsule      Take 2 g by mouth daily. 2 capsules daily        FLAGYL PO      Take 500 mg by mouth 2 times daily    Acute infectious diarrhea       folic acid 1 MG tablet    FOLVITE    90 tablet    Take 1 tablet (1 mg) by mouth daily    Oral aphthae       GENTEAL MILD OP      Apply  to eye daily.        hydrocortisone 2.5 % cream     30 g    Apply BID to affected region(s) for 7-10 days.    Rash       levothyroxine 75 MCG tablet    SYNTHROID/LEVOTHROID    90 tablet    TAKE ONE TABLET BY MOUTH EVERY DAY    Hypothyroidism, unspecified type       loratadine 10 MG tablet    CLARITIN     Take 10 mg by mouth every morning        LOSARTAN POTASSIUM PO      Take 25 mg by mouth every evening    Paroxysmal atrial fibrillation (H)       metoprolol tartrate 25 MG tablet    LOPRESSOR    60 tablet    Take 1 tablet (25 mg) by mouth 2 times daily    Atrial fibrillation, unspecified type (H), Hypertension goal BP (blood pressure) < 150/90       nitroGLYcerin 0.4 MG sublingual tablet    NITROSTAT    25 tablet    Place 1 tablet (0.4 mg) under the tongue every 5 minutes as needed for chest pain    Chest pain       * order for DME     1 Box    Equipment being ordered: Dispense face mask. Mrs. Spicer is immunosuppressed due to rheumatoid arthritis.    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 Use with Albuterol.    Hypoxia       order for DME     1 each    Equipment being ordered: Dispense baffle, for use with  nebulizer.    Pneumonia of left upper lobe due to Mycoplasma pneumoniae       * order for DME     1 each    Dispense SP Walker-small    Pain of toe of right foot, Status post bunionectomy       PRESERVISION AREDS PO      Take 1 tablet by mouth daily        ranibizumab 0.3 MG/0.05ML Soln    LUCENTIS     0.3 mg by Intravitreal route        ranitidine 150 MG tablet    ZANTAC    60 tablet    Take 1 tablet (150 mg) by mouth 2 times daily    Gastroesophageal reflux disease without esophagitis       REFRESH P.M. Oint      Apply  to eye. Daily at bedtime        senna-docusate 8.6-50 MG per tablet    SENOKOT-S;PERICOLACE    120 tablet    Take 2 tablets by mouth At Bedtime Hold if diarrhea occurs.    Constipation, chronic       triamcinolone 0.1 % cream    KENALOG    453.6 g    Apply sparingly to affected area on face three times daily.    Facial lesion       ZYRTEC ALLERGY 10 MG tablet   Generic drug:  cetirizine      Take 10 mg by mouth At Bedtime        * Notice:  This list has 3 medication(s) that are the same as other medications prescribed for you. Read the directions carefully, and ask your doctor or other care provider to review them with you.

## 2018-02-13 NOTE — PROGRESS NOTES
Clinic Care Coordination Contact  Patient calls in stating she developed UTI symptoms this morning at 3:00 am including burning with urination, lower abdominal pain, frequency and hesitancy.  Patient states she has not checked her temperature, but denies feeling feverish.  Patient denies any other symptoms.  Patient will be coming in at 10:20 am to see the walk-in provider due to no availability with PCP.  Patient wants Dr. Lockett to be aware due to her complex medical history.    FYI to PCP.    Melissa Behl BSN, RN, N  [facility] Care Coordinator  [phone]

## 2018-02-13 NOTE — PATIENT INSTRUCTIONS
Urinalysis normal. Urine culture pending.  Drink lots of water  If you develop additional symptoms such as nausea, vomiting, abdominal pain, etc, please let us know and we'll help you decide the next best steps      At [facility], we strive to deliver an exceptional experience to you, every time we see you.  If you receive a survey in the mail, please send us back your thoughts. We really do value your feedback.    Suggested websites for health information:  Www.LifeBio.org : Up to date and easily searchable information on multiple topics.  Www.medlineplus.gov : medication info, interactive tutorials, watch real surgeries online  Www.familydoctor.org : good info from the Academy of Family Physicians  Www.cdc.gov : public health info, travel advisories, epidemics (H1N1)  Www.aap.org : children's health info, normal development, vaccinations  Www.health.Count includes the [facility].[place].us : [place] dept of health, public health issues in [place], N1N1    Your care team:                            Family Medicine Internal Medicine   MD Tre Hicks MD Shantel Branch-Fleming, MD Katya Georgiev PA-C Megan Hill, APRN CNP    Obi Sharma MD Pediatrics   Benja Lancaster, PAJOSE Caballero, CNP Mariann Phillips APRN CNP   MD Maritza Condon MD Deborah Mielke, MD Kim Thein, APRN [facility]      Clinic hours: Monday - Thursday 7 am-7 pm; Fridays 7 am-5 pm.   Urgent care: Monday - Friday 11 am-9 pm; Saturday and Sunday 9 am-5 pm.  Pharmacy : Monday -Thursday 8 am-8 pm; Friday 8 am-6 pm; Saturday and Sunday 9 am-5 pm.     Clinic: [phone]   Pharmacy: [phone]      Dysuria     Painful urination (dysuria) is often caused by a problem in the urinary tract.   Dysuria is pain felt during urination. It is often described as a burning. Learn more about this problem and how it can be treated.  What causes dysuria?  Possible causes include:    Infection with a bacteria or virus such as a urinary tract  "infection (UTI or a sexually transmitted infection (STI)    Sensitivity or allergy to chemicals such as those found in lotions and other products    Prostate or bladder problems    Radiation therapy to the pelvic area  How is dysuria diagnosed?  Your healthcare provider will examine you. He or she will ask about your symptoms and health. After talking with you and doing a physical exam, your healthcare provider may know what is causing your dysuria. He or she will usually request  a sample of your urine. Tests of your urine, or a \"urinalysis,\" are done. A urinalysis may include:    Looking at the urine sample (visual exam)    Checking for substances (chemical exam)    Looking at a small amount under a microscope (microscopic exam)  Some parts of the urinalysis may be done in the provider's office and some in a lab. And, the urine sample may be checked for bacteria and yeast (urine culture). Your healthcare provider will tell you more about these tests if they are needed.  How is dysuria treated?  Treatment depends on the cause. If you have a bacterial infection, you may need antibiotics. You may be given medicines to make it easier for you to urinate and help relieve pain. Your healthcare provider can tell you more about your treatment options. Untreated, symptoms may get worse.  When to call your healthcare provider  Call the healthcare provider right away if you have any of the following:    Fever of 100.4 F (38 C) or higher     No improvement after three days of treatment    Trouble urinating because of pain    New or increased discharge from the vagina or penis    Rash or joint pain    Increased back or abdominal pain    Enlarged painful lymph nodes (lumps) in the groin   Date Last Reviewed: 1/1/2017 2000-2017 [employer]. [address] [place] [address], [place] [address]. All rights reserved. This information is not intended as a substitute for professional medical care. Always follow your healthcare " professional's instructions.        Dysuria with Uncertain Cause (Adult)    The urethra is the tube that allows urine to pass out of the body. In a woman, the urethra is the opening above the vagina. In men, the urethra is the opening on the tip of the penis. Dysuria is the feeling of pain or burning in the urethra when passing urine.  Dysuria can be caused by anything that irritates or inflames the urethra. An infection or chemical irritation can cause this reaction. A bladder infection is the most common cause of dysuria in adults. A urine test can diagnose this. A bladder infection needs antibiotic treatment.  Soaps, lotions, colognes and feminine hygiene products can cause dysuria. So can birth control jellies, creams, and foams. It will go away 1 to 3 days after using these irritants.  Sexually transmitted diseases (STDs) such as chlamydia or gonorrhea can cause dysuria. Your healthcare provider may take a culture sample. Your provider may start you on antibiotic medicine before the culture test returns.  In women who have gone through menopause, dysuria can be from dryness in the lining of the urethra. This can be treated with hormones. Dysuria becomes long-term (chronic) when it lasts for weeks or months. You may need to see a specialist (urologist) to diagnose and treat chronic dysuria.  Home care  These home care tips may help:    Don't use any chemicals or products that you think may be causing your symptoms.    If you were given a prescription medicine, take as directed. Be sure to take it until it is all used up.    If a culture was taken, don't have sex until you have been told that it is negative. This means you don't have an infection. Then follow your healthcare provider's advice to treat your condition.  If a culture was done and it is positive:    Both you and your sexual partner may need to be treated. This is true even if your partner has no symptoms.    Contact your healthcare provider or go to  an urgent care clinic or the public health department to be looked at and treated.    Don't have sex until both you and your partner(s) have finished all antibiotics and your healthcare provider says you are no longer contagious.    Learn about and use safe sex practices. The safest sex is with a partner who has tested negative and only has sex with you. Condoms can prevent STDs from spreading, but they aren't a guarantee.  Follow-up care  Follow up with your healthcare provider, or as advised. If a culture was taken, you may call as directed for the results. If you have an STD, follow up with your provider or the public health department for a complete STD screening, including HIV testing. For more information, contact CDC-INFO at 129-472-2135.  When to seek medical advice  Call your healthcare provider right away if any of these occur:    You aren't better after 3 days of treatment    Fever of 100.4 F (38 C) or higher, or as directed by your healthcare provider    Back or belly pain that gets worse    You can't urinate because of pain    New discharge from the urethra, vagina, or penis    Painful sores on the penis    Rash or joint pain    Painful lumps (lymph nodes) in the groin    Testicle pain or swelling of the scrotum  Date Last Reviewed: 11/1/2016 2000-2017 [employer]. [address] [place] [address], [place] [address]. All rights reserved. This information is not intended as a substitute for professional medical care. Always follow your healthcare professional's instructions.

## 2018-02-13 NOTE — TELEPHONE ENCOUNTER
Pt called clinic to see why Stacey has not called her back yes. She states she was supposed to receive a callback Thursday or Friday. Informed pt Stacey is currently unavailable but Stacey would be notified when available. Pt states she will be gone this afternoon for a bit so would like a callback ASAP. Informed pt we would do our best to call prior to her leaving.  Jaz Dumas, CMA

## 2018-02-14 ENCOUNTER — TELEPHONE (OUTPATIENT)
Dept: CARDIOLOGY | Facility: CLINIC | Age: 83
End: 2018-02-14

## 2018-02-14 ENCOUNTER — OFFICE VISIT (OUTPATIENT)
Dept: NURSING | Facility: CLINIC | Age: 83
End: 2018-02-14
Payer: MEDICARE

## 2018-02-14 DIAGNOSIS — J84.9 ILD (INTERSTITIAL LUNG DISEASE) (H): ICD-10-CM

## 2018-02-14 DIAGNOSIS — M05.79 RHEUMATOID ARTHRITIS INVOLVING MULTIPLE SITES WITH POSITIVE RHEUMATOID FACTOR (H): ICD-10-CM

## 2018-02-14 LAB
6 MIN WALK (FT): 600 FT
6 MIN WALK (M): 183 M

## 2018-02-14 PROCEDURE — 99207 ZZC DROP WITH A PROCEDURE: CPT | Performed by: INTERNAL MEDICINE

## 2018-02-14 PROCEDURE — 94375 RESPIRATORY FLOW VOLUME LOOP: CPT | Performed by: INTERNAL MEDICINE

## 2018-02-14 PROCEDURE — 94726 PLETHYSMOGRAPHY LUNG VOLUMES: CPT | Performed by: INTERNAL MEDICINE

## 2018-02-14 PROCEDURE — 94618 PULMONARY STRESS TESTING: CPT | Performed by: INTERNAL MEDICINE

## 2018-02-14 PROCEDURE — 94729 DIFFUSING CAPACITY: CPT | Performed by: INTERNAL MEDICINE

## 2018-02-14 NOTE — TELEPHONE ENCOUNTER
Called and spoke to patient. She is unclear about how much out of pocket she has to pay to be eligible for patient assistance for Eliquis, and also how much the Eliquis will be once she meets her deductible.   Called [employer] at [phone]. ID [id number].  Cost is being split between her deductible and initial coverage stage. Currently it is $596/ 90 day supply. It will be that price just once, the next time she refills it , the cost will be $ 276 / 90 day supply. If she reaches the coverage gap, then the price will be $ 138/ 30 day supply. Her deductible is $ 400. So when she pays the $596, her copay goes down to $276 for 90 days.   Also she will have met the requirements to be considered for the patient assistance program. She just needs to send a copy of her receipt for the co pay to [employer].( see phone note from 2/2/18 )  Called and spoke to patient. She was given above information and recorded at home. She will plan to purchase a one month supply at a time and supplement it with the supply she has at home. The cost should be about $ 200 / month, but she will refill it every other month to keep the cost down. Once she has gotten three refills, she will give us a copy of the pharmacy receipts and we can submit them to [employer]. Will plan to touch base with her around May to be sure things are on track.

## 2018-02-14 NOTE — MR AVS SNAPSHOT
After Visit Summary   2/14/2018    Linda Spicer    MRN: [id number]           Patient Information     Date Of Birth          6/13/1931        Visit Information        Provider Department      2/14/2018 2:30 PM PFT LAB [facility]        Today's Diagnoses     ILD (interstitial lung disease) (H)        Rheumatoid arthritis involving multiple sites with positive rheumatoid factor (H)           Follow-ups after your visit        Your next 10 appointments already scheduled     Mar 07, 2018 10:00 AM CST   Level 2 with BAY 9 INFUSION   [facility]    [address] [place] [address]   [phone]            Mar 07, 2018  1:40 PM CST   Nurse Only with BK ANCILLARY   [facility] ([facility])    [address] [facility] [address]   [phone]            Mar 19, 2018  1:10 PM CDT   Return Visit with Emeterio Loza MD   [facility]    [address] [place] [address]   [phone]            Apr 04, 2018  2:30 PM CDT   Level 2 with BAY 6 INFUSION   [facility]    [address] [facility] [address]   [phone]            Apr 04, 2018  4:00 PM CDT   Return Visit with DEVICE CHECK RN CARD   [facility]    [address] [place] [address]   [phone]            Apr 23, 2018 11:00 AM CDT   New Visit with Tomi Peña MD   [facility]    [address] [facility] [address]   [phone]            May 08, 2018 10:20 AM CDT   Return Visit with Mario Davenport MD   [facility] ([facility])    [address] [facility] [address]   [phone]            Jim 15, 2019   9:30 AM CST   Return Visit with Sandra Comer MD   [facility] ([facility])    [address] [place] [address] [phone]              Who to contact     If you have questions or need follow up information about today's clinic visit or your schedule please contact [facility] directly at [phone].  Normal or non-critical lab and imaging results will be communicated to you by [employer], letter or phone within 4 business days after the clinic has received the results. If you do not hear from us within 7 days, please contact the clinic through [employer] or phone. If you have a critical or abnormal lab result, we will notify you by phone as soon as possible.  Submit refill requests through [employer] or call your pharmacy and they will forward the refill request to us. Please allow 3 business days for your refill to be completed.          Additional Information About Your Visit        [employer] Information     [employer] gives you secure access to your electronic health record. If you see a primary care provider, you can also send messages to your care team and make appointments. If you have questions, please call your primary care clinic.  If you do not have a primary care provider, please call [phone] and they will assist you.      [employer] is an electronic gateway that provides easy, online access to your medical records. With [employer], you can request a clinic appointment, read your test results, renew a prescription or communicate with your care team.     To access your existing account, please contact your [facility] or call [phone] for assistance.        Care EveryWhere ID     This is your Care EveryWhere ID. This could be used by other organizations to access your [facility] medical records  [id number]         Blood Pressure from Last 3 Encounters:   02/13/18 120/78   02/07/18 143/64   02/06/18 116/70     Weight from Last 3 Encounters:   02/13/18 73.8 kg (162 lb 9.6 oz)   02/07/18 73.9 kg (163 lb)   02/06/18 74 kg (163 lb 3.2 oz)              We Performed the Following     6 minute walk test     General PFT Lab (Please always keep checked)     HC DIFFUSING CAPACITY     HC PLETHYSMOGRAPHY LUNG VOLUMES W/WO AIRWAY RESIST     Pulmonary Function Test     PULMONARY STRESS TEST     RESPIRATORY FLOW VOLUME LOOP        Primary Care Provider Office Phone # Fax #    Tre Lockett [phone]       [address]  [facility] [address]        Equal Access to Services     [facility]: Hadii aad ku hadasho Soomaali, waaxda luqadaha, qaybta kaalmada adeegyada, waxcameron petit haydylonn adenicolas moss . So [facility] [phone].    ATENCIÓN: Si habla español, tiene a merchant disposición servicios gratuitos de asistencia lingüística. Llame al [phone].    We comply with applicable federal civil rights laws and [place] laws. We do not discriminate on the basis of race, color, national origin, age, disability, sex, sexual orientation, or gender identity.            Thank you!     Thank you for choosing [facility]  for your care. Our goal is always to provide you with excellent care. Hearing back from our patients is one way we can continue to improve our services. Please take a few minutes to complete the written survey that you may receive in the mail after your visit with us. Thank you!             Your Updated Medication List - Protect others around you: Learn how to safely use, store and throw away your medicines at www.disposemymeds.org.          This list is accurate as of 2/14/18  3:10 PM.  Always use your most recent med list.                   Brand Name Dispense Instructions for use Diagnosis    apixaban ANTICOAGULANT 2.5 MG tablet    ELIQUIS    180 tablet    Take 1 tablet (2.5 mg) by mouth 2 times daily    Atrial flutter, paroxysmal (H)       azaTHIOprine 50 MG tablet    IMURAN     90 tablet    Take 1 tablet (50 mg) by mouth daily    Rheumatoid arthritis involving multiple sites with positive rheumatoid factor (H)       bismuth subsalicylate 262 MG chewable tablet    PEPTO BISMOL    100 tablet    Take 2 tablets (524 mg) by mouth 4 times daily as needed    Dyspepsia and disorder of function of stomach       Blood Pressure Monitor Kit     1 kit    1 kit daily as needed    CHF (congestive heart failure) (H)       bumetanide 1 MG tablet    BUMEX    30 tablet    Take 1 tablet (1 mg) by mouth every morning    Chronic diastolic congestive heart failure (H)       calcium-vitamin D 600-400 MG-UNIT per tablet    CALTRATE     Take 1 tablet by mouth 2 times daily        Cranberry 180 MG Caps      Take 1 capsule by mouth daily Unsure of strength        fish oil-omega-3 fatty acids 1000 MG capsule      Take 2 g by mouth daily. 2 capsules daily        FLAGYL PO      Take 500 mg by mouth 2 times daily    Acute infectious diarrhea       folic acid 1 MG tablet    FOLVITE    90 tablet    Take 1 tablet (1 mg) by mouth daily    Oral aphthae       GENTEAL MILD OP      Apply  to eye daily.        hydrocortisone 2.5 % cream     30 g    Apply BID to affected region(s) for 7-10 days.    Rash       levothyroxine 75 MCG tablet    SYNTHROID/LEVOTHROID    90 tablet    TAKE ONE TABLET BY MOUTH EVERY DAY    Hypothyroidism, unspecified type       loratadine 10 MG tablet    CLARITIN     Take 10 mg by mouth every morning        LOSARTAN POTASSIUM PO      Take 25 mg by mouth every evening    Paroxysmal atrial fibrillation (H)       metoprolol tartrate 25 MG tablet    LOPRESSOR    60 tablet    Take 1 tablet (25 mg) by mouth 2 times daily    Atrial fibrillation, unspecified type (H), Hypertension goal BP (blood pressure) < 150/90       nitroGLYcerin 0.4 MG sublingual tablet    NITROSTAT    25 tablet    Place 1 tablet (0.4 mg) under the tongue every 5 minutes as needed for chest pain    Chest pain       * order for DME     1 Box     Equipment being ordered: Dispense face mask. Mrs. Spicer is immunosuppressed due to rheumatoid arthritis.    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 Use with Albuterol.    Hypoxia       order for DME     1 each    Equipment being ordered: Dispense baffle, for use with nebulizer.    Pneumonia of left upper lobe due to Mycoplasma pneumoniae       * order for DME     1 each    Dispense SP Walker-small    Pain of toe of right foot, Status post bunionectomy       PRESERVISION AREDS PO      Take 1 tablet by mouth daily        ranibizumab 0.3 MG/0.05ML Soln    LUCENTIS     0.3 mg by Intravitreal route        ranitidine 150 MG tablet    ZANTAC    60 tablet    Take 1 tablet (150 mg) by mouth 2 times daily    Gastroesophageal reflux disease without esophagitis       REFRESH P.M. Oint      Apply  to eye. Daily at bedtime        senna-docusate 8.6-50 MG per tablet    SENOKOT-S;PERICOLACE    120 tablet    Take 2 tablets by mouth At Bedtime Hold if diarrhea occurs.    Constipation, chronic       triamcinolone 0.1 % cream    KENALOG    453.6 g    Apply sparingly to affected area on face three times daily.    Facial lesion       ZYRTEC ALLERGY 10 MG tablet   Generic drug:  cetirizine      Take 10 mg by mouth At Bedtime        * Notice:  This list has 3 medication(s) that are the same as other medications prescribed for you. Read the directions carefully, and ask your doctor or other care provider to review them with you.

## 2018-02-14 NOTE — PROGRESS NOTES
PFT Note: Completed SVC, FVC, Pleth and DLCO per Dr. Comer.    6 Minute Walk Note:    SpO2 on RA @ Rest = 100%  SpO2 on RA with Activity = 96%    Patient walked at a slow steady pace on RA for 6 minutes with one 10 second rest time and did not desaturate below 96%.

## 2018-02-16 ENCOUNTER — TELEPHONE (OUTPATIENT)
Dept: FAMILY MEDICINE | Facility: CLINIC | Age: 83
End: 2018-02-16

## 2018-02-16 DIAGNOSIS — N39.0 URINARY TRACT INFECTION WITHOUT HEMATURIA, SITE UNSPECIFIED: Primary | ICD-10-CM

## 2018-02-16 LAB
BACTERIA SPEC CULT: ABNORMAL
BACTERIA SPEC CULT: ABNORMAL
SPECIMEN SOURCE: ABNORMAL

## 2018-02-16 RX ORDER — DOXYCYCLINE 100 MG/1
100 CAPSULE ORAL 2 TIMES DAILY
Qty: 14 CAPSULE | Refills: 0 | Status: SHIPPED | OUTPATIENT
Start: 2018-02-16 | End: 2019-02-07

## 2018-02-16 NOTE — TELEPHONE ENCOUNTER
Patient contacted and informed of the below per provider documentation. Patient verbalizes understanding. She denies any symptoms at this time.     Jacquelyn Schulz RN

## 2018-02-16 NOTE — TELEPHONE ENCOUNTER
Please call Linda and let her know the following:  I would like to send a medication for her mild UTI to the pharmacy. Please assure Linda that I spoke with her PCP, Dr. Lockett, about the antibiotic selection (this was a concern for her during her visit). I will send doxycycline 100 mg PO BID x7 days. I sent it to [facility] (pharamcy on file). Thanks

## 2018-02-16 NOTE — TELEPHONE ENCOUNTER
I attempted to call Linda on both home and cell phone numbers. Voicemail was not an option. Her urine culture came back with 10,000-50,000 colonies of staph in her urine. If she calls back, please ask her how she is feeling and if she's still symptomatic.    Please call back this afternoon to ask her the above.

## 2018-02-18 LAB
DLCOUNC-%PRED-PRE: 62 %
DLCOUNC-PRE: 11.46 ML/MIN/MMHG
DLCOUNC-PRED: 18.36 ML/MIN/MMHG
ERV-%PRED-PRE: 166 %
ERV-PRE: 0.57 L
ERV-PRED: 0.34 L
EXPTIME-PRE: 5.71 SEC
FEF2575-%PRED-PRE: 77 %
FEF2575-PRE: 1.06 L/SEC
FEF2575-PRED: 1.36 L/SEC
FEFMAX-%PRED-PRE: 73 %
FEFMAX-PRE: 2.93 L/SEC
FEFMAX-PRED: 3.99 L/SEC
FEV1-%PRED-PRE: 80 %
FEV1-PRE: 1.4 L
FEV1FEV6-PRE: 74 %
FEV1FEV6-PRED: 77 %
FEV1FVC-PRE: 75 %
FEV1FVC-PRED: 76 %
FEV1SVC-PRE: 73 %
FEV1SVC-PRED: 66 %
FIFMAX-PRE: 2.63 L/SEC
FRCPLETH-%PRED-PRE: 91 %
FRCPLETH-PRE: 2.45 L
FRCPLETH-PRED: 2.69 L
FVC-%PRED-PRE: 80 %
FVC-PRE: 1.85 L
FVC-PRED: 2.3 L
IC-%PRED-PRE: 58 %
IC-PRE: 1.34 L
IC-PRED: 2.28 L
RVPLETH-%PRED-PRE: 82 %
RVPLETH-PRE: 1.89 L
RVPLETH-PRED: 2.29 L
TLCPLETH-%PRED-PRE: 78 %
TLCPLETH-PRE: 3.79 L
TLCPLETH-PRED: 4.81 L
VA-%PRED-PRE: 66 %
VA-PRE: 3.27 L
VC-%PRED-PRE: 72 %
VC-PRE: 1.9 L
VC-PRED: 2.62 L

## 2018-02-22 ENCOUNTER — CARE COORDINATION (OUTPATIENT)
Dept: CARE COORDINATION | Facility: CLINIC | Age: 83
End: 2018-02-22

## 2018-02-22 ENCOUNTER — TELEPHONE (OUTPATIENT)
Dept: CARDIOLOGY | Facility: CLINIC | Age: 83
End: 2018-02-22

## 2018-02-22 NOTE — PROGRESS NOTES
"Patient calls into RN CC to report issues with her A-fib since starting doxycycline as prescribed on 2/16/18 for a UTI.  Patient states she was in the ED at [facility] last night due to experiencing palpitations and \"not feeling right.\"  Patient was brought in by ambulance.  Per Care Everywhere patient had 1 troponin drawn that was negative, patient's EKG showed a paced rhythm, patient's pacemaker was investigated and was found to be WNL.  Patient was discharged home and was instructed to f/u with PCP and cardiology.  RN CC reviewed drug insert information on doxycycline and did not note any cardiac side effects or precautions.  Patient requests a message to be sent to Dr. Lockett to advise if she should continue to take her doxycycline, as she reports she has experienced palpitations since starting it.    Please advise.    Melissa Behl BSN, RN, [facility] Care Coordinator  [phone]     "

## 2018-02-23 NOTE — PROGRESS NOTES
RN informed patient of Dr. Lockett's directions below.  Patient verbalized understanding.    Melissa Behl BSN, RN, PHN  [facility] Care Coordinator  [phone]

## 2018-02-26 ENCOUNTER — CARE COORDINATION (OUTPATIENT)
Dept: CARE COORDINATION | Facility: CLINIC | Age: 83
End: 2018-02-26

## 2018-02-26 NOTE — PROGRESS NOTES
Clinic Care Coordination Contact  Patient called in to RN CC, as she did not recall whether she was to return for a repeat UA or not.  RN CC reviewed Dr. Lockett's instructions from 2/22/18 with patient:  Tre Lockett MD     5:03 PM   Note      For now, continue Doxycycline.  If UTI does not improve, then recommend repeat urinalysis.            Patient verbalized understanding.  Patient states she continues to have issues with her pacemaker and will be seeing her cardiologist tomorrow.  RN CC noted upon chart review that patient notified her cardiologist of her ED visit per 2/22/18 telephone encounter.    RN CC will follow up with patient as planned.    Melissa Behl BSN, RN, N  [facility] Care Coordinator  [phone]

## 2018-02-27 ENCOUNTER — OFFICE VISIT (OUTPATIENT)
Dept: CARDIOLOGY | Facility: CLINIC | Age: 83
End: 2018-02-27
Attending: INTERNAL MEDICINE
Payer: MEDICARE

## 2018-02-27 VITALS
HEIGHT: 64 IN | DIASTOLIC BLOOD PRESSURE: 76 MMHG | WEIGHT: 165.9 LBS | HEART RATE: 61 BPM | SYSTOLIC BLOOD PRESSURE: 117 MMHG | BODY MASS INDEX: 28.32 KG/M2 | OXYGEN SATURATION: 100 %

## 2018-02-27 DIAGNOSIS — R00.2 PALPITATIONS: Primary | ICD-10-CM

## 2018-02-27 DIAGNOSIS — F41.9 ANXIETY: ICD-10-CM

## 2018-02-27 PROCEDURE — 93010 ELECTROCARDIOGRAM REPORT: CPT | Mod: ZP | Performed by: INTERNAL MEDICINE

## 2018-02-27 PROCEDURE — G0463 HOSPITAL OUTPT CLINIC VISIT: HCPCS | Mod: 25,ZF

## 2018-02-27 PROCEDURE — 93005 ELECTROCARDIOGRAM TRACING: CPT | Mod: ZF

## 2018-02-27 PROCEDURE — 99214 OFFICE O/P EST MOD 30 MIN: CPT | Mod: ZP | Performed by: INTERNAL MEDICINE

## 2018-02-27 RX ORDER — LORAZEPAM 1 MG/1
1 TABLET ORAL 2 TIMES DAILY
Qty: 30 TABLET | Refills: 0 | Status: SHIPPED | OUTPATIENT
Start: 2018-02-27 | End: 2018-08-20

## 2018-02-27 RX ORDER — SACCHAROMYCES BOULARDII 250 MG
250 CAPSULE ORAL DAILY
Status: ON HOLD | COMMUNITY
End: 2018-12-04

## 2018-02-27 ASSESSMENT — PAIN SCALES - GENERAL: PAINLEVEL: NO PAIN (0)

## 2018-02-27 NOTE — PATIENT INSTRUCTIONS
Patient Instructions:  It was a pleasure to see you in the cardiology clinic today.      If you have any questions, you can reach my nurse, Becky Jimenez, at [phone].  Press Option #1 for the [facility], and then press Option #3 for nursing.  We are encouraging the use of MyChart to communicate with your HealthCare Provider    Note the new medications: lorazepam (ativan) 1 mg by mouth twice daily.  This is a benzodiazapine.  Take the Cozaar 50 mg by mouth at 4 pm.  Stop the following medications: none    Follow the American Heart Association Diet and Lifestyle recommendations:  Limit saturated fat, trans fat, sodium, red meat, sweets and sugar-sweetened beverages. If you choose to eat red meat, compare labels and select the leanest cuts available.  Aim for at least 150 minutes of moderate physical activity or 75 minutes of vigorous physical activity - or an equal combination of both - each week.    The results from today include: none  Please follow up with Dr. Loza in six months    Sincerely,    Emeterio Loza MD     If you have an urgent need after hours (8:00 am to 4:30 pm) please call [phone] and ask for the cardiology fellow on call.

## 2018-02-27 NOTE — MR AVS SNAPSHOT
After Visit Summary   2/27/2018    Linda Spicer    MRN: [id number]           Patient Information     Date Of Birth          6/13/1931        Visit Information        Provider Department      2/27/2018 1:00 PM Emeterio Loza MD [facility]        Today's Diagnoses     Palpitations    -  1    Anxiety          Care Instructions    Patient Instructions:  It was a pleasure to see you in the cardiology clinic today.      If you have any questions, you can reach my nurse, Becky Jimenez, at [phone].  Press Option #1 for the [facility], and then press Option #3 for nursing.  We are encouraging the use of [employer] to communicate with your HealthCare Provider    Note the new medications: lorazepam (ativan) 1 mg by mouth twice daily.  This is a benzodiazapine.  Take the Cozaar 50 mg by mouth at 4 pm.  Stop the following medications: none    Follow the American Heart Association Diet and Lifestyle recommendations:  Limit saturated fat, trans fat, sodium, red meat, sweets and sugar-sweetened beverages. If you choose to eat red meat, compare labels and select the leanest cuts available.  Aim for at least 150 minutes of moderate physical activity or 75 minutes of vigorous physical activity - or an equal combination of both - each week.    The results from today include: none  Please follow up with Dr. Loza in six months    Sincerely,    Emeterio Loza MD     If you have an urgent need after hours (8:00 am to 4:30 pm) please call [phone] and ask for the cardiology fellow on call.                    Follow-ups after your visit        Additional Services     Follow-Up with Cardiologist       Schedule at [place]                  Follow-up notes from your care team     Return in about 6 months (around 8/27/2018).      Your next 10 appointments already scheduled     Mar 02, 2018 11:00 AM CST   Return Visit with Asia Steven PA-C   [facility]  ([facility])    [address] [place] [address]   [phone]            Mar 07, 2018 10:00 AM CST   Level 2 with BAY 9 INFUSION   [facility] ([facility])    [address] [place] [address]   [phone]            Mar 07, 2018  1:40 PM CST   Nurse Only with BK ANCILLARY   [facility] ([facility])    [address] [facility] [address]   [phone]            Apr 04, 2018  2:30 PM CDT   Level 2 with BAY 6 INFUSION   [facility] ([facility])    [address] [place] [address]   [phone]            Apr 04, 2018  4:00 PM CDT   Return Visit with DEVICE CHECK RN CARD   [facility] ([facility])    [address] [place] [address]   [phone]            Apr 23, 2018 11:00 AM CDT   New Visit with Tomi Peña MD   [facility] ([facility])    [address] [place] [address]   [phone]            May 08, 2018 10:20 AM CDT   Return Visit with Mario Davenport MD   [facility] ([facility])    [address] [facility] [address]   [phone]            Aug 20, 2018 12:30 PM CDT   Return Visit with Emeterio Loza MD   [facility] ([facility])    [address] [place] [address]   [phone]            Jim 15, 2019  9:30 AM CST   Return Visit with Sandra Comer MD   [facility] ([facility])    [address] [place] [address]   [phone]              Future tests that were ordered for you today     Open Future Orders        Priority Expected Expires Ordered    Follow-Up with Cardiologist Routine 8/26/2018 11/24/2018 2/27/2018            Who to contact   "   If you have questions or need follow up information about today's clinic visit or your schedule please contact [facility] directly at [phone].  Normal or non-critical lab and imaging results will be communicated to you by MyChart, letter or phone within 4 business days after the clinic has received the results. If you do not hear from us within 7 days, please contact the clinic through [employer] or phone. If you have a critical or abnormal lab result, we will notify you by phone as soon as possible.  Submit refill requests through [employer] or call your pharmacy and they will forward the refill request to us. Please allow 3 business days for your refill to be completed.          Additional Information About Your Visit        [employer] Information     [employer] gives you secure access to your electronic health record. If you see a primary care provider, you can also send messages to your care team and make appointments. If you have questions, please call your primary care clinic.  If you do not have a primary care provider, please call [phone] and they will assist you.        Care EveryWhere ID     This is your Care EveryWhere ID. This could be used by other organizations to access your [facility] medical records  [id number]        Your Vitals Were     Pulse Height Pulse Oximetry BMI (Body Mass Index)          61 1.626 m (5' 4\") 100% 28.48 kg/m2         Blood Pressure from Last 3 Encounters:   02/27/18 117/76   02/13/18 120/78   02/07/18 143/64    Weight from Last 3 Encounters:   02/27/18 75.3 kg (165 lb 14.4 oz)   02/13/18 73.8 kg (162 lb 9.6 oz)   02/07/18 73.9 kg (163 lb)              We Performed the Following     EKG 12-lead, tracing only (Same Day)          Today's Medication Changes          These changes are accurate as of 2/27/18  2:43 PM.  If you have any questions, ask your nurse or doctor.               Start taking these medicines.        Dose/Directions    LORazepam 1 MG tablet   Commonly " known as:  ATIVAN   Used for:  Anxiety   Started by:  Emeterio Loza MD        Dose:  1 mg   Take 1 tablet (1 mg) by mouth 2 times daily   Quantity:  30 tablet   Refills:  0            Where to get your medicines      Some of these will need a paper prescription and others can be bought over the counter.  Ask your nurse if you have questions.     Bring a paper prescription for each of these medications     LORazepam 1 MG tablet                Primary Care Provider Office Phone # Fax #    Tre Lockett [phone]       [address]  [facility] [address]        Equal Access to Services     [facility]: Hadii rakesh ku hadasho Soomaali, waaxda luqadaha, qaybta kaalmada adeegyada, zahraa moss . So [facility] [phone].    ATENCIÓN: Si habla español, tiene a merchant disposición servicios gratuitos de asistencia lingüística. Ll[facility] [phone].    We comply with applicable federal civil rights laws and [place] laws. We do not discriminate on the basis of race, color, national origin, age, disability, sex, sexual orientation, or gender identity.            Thank you!     Thank you for choosing [facility]  for your care. Our goal is always to provide you with excellent care. Hearing back from our patients is one way we can continue to improve our services. Please take a few minutes to complete the written survey that you may receive in the mail after your visit with us. Thank you!             Your Updated Medication List - Protect others around you: Learn how to safely use, store and throw away your medicines at www.disposemymeds.org.          This list is accurate as of 2/27/18  2:43 PM.  Always use your most recent med list.                   Brand Name Dispense Instructions for use Diagnosis    apixaban ANTICOAGULANT 2.5 MG tablet    ELIQUIS    180 tablet    Take 1 tablet (2.5 mg) by mouth 2 times daily    Atrial flutter, paroxysmal (H)        azaTHIOprine 50 MG tablet    IMURAN    90 tablet    Take 1 tablet (50 mg) by mouth daily    Rheumatoid arthritis involving multiple sites with positive rheumatoid factor (H)       bismuth subsalicylate 262 MG chewable tablet    PEPTO BISMOL    100 tablet    Take 2 tablets (524 mg) by mouth 4 times daily as needed    Dyspepsia and disorder of function of stomach       Blood Pressure Monitor Kit     1 kit    1 kit daily as needed    CHF (congestive heart failure) (H)       bumetanide 1 MG tablet    BUMEX    30 tablet    Take 1 tablet (1 mg) by mouth every morning    Chronic diastolic congestive heart failure (H)       calcium-vitamin D 600-400 MG-UNIT per tablet    CALTRATE     Take 1 tablet by mouth 2 times daily        Cranberry 180 MG Caps      Take 1 capsule by mouth daily Unsure of strength        fish oil-omega-3 fatty acids 1000 MG capsule      Take 2 g by mouth daily. 2 capsules daily        FLAGYL PO      Take 500 mg by mouth 2 times daily    Acute infectious diarrhea       folic acid 1 MG tablet    FOLVITE    90 tablet    Take 1 tablet (1 mg) by mouth daily    Oral aphthae       GENTEAL MILD OP      Apply  to eye daily.        hydrocortisone 2.5 % cream     30 g    Apply BID to affected region(s) for 7-10 days.    Rash       levothyroxine 75 MCG tablet    SYNTHROID/LEVOTHROID    90 tablet    TAKE ONE TABLET BY MOUTH EVERY DAY    Hypothyroidism, unspecified type       loratadine 10 MG tablet    CLARITIN     Take 10 mg by mouth every morning        LORazepam 1 MG tablet    ATIVAN    30 tablet    Take 1 tablet (1 mg) by mouth 2 times daily    Anxiety       LOSARTAN POTASSIUM PO      Take 25 mg by mouth every evening    Paroxysmal atrial fibrillation (H)       metoprolol tartrate 25 MG tablet    LOPRESSOR    60 tablet    Take 1 tablet (25 mg) by mouth 2 times daily    Atrial fibrillation, unspecified type (H), Hypertension goal BP (blood pressure) < 150/90       nitroGLYcerin 0.4 MG sublingual tablet     NITROSTAT    25 tablet    Place 1 tablet (0.4 mg) under the tongue every 5 minutes as needed for chest pain    Chest pain       * order for DME     1 Box    Equipment being ordered: Dispense face mask. Mrs. Spicer is immunosuppressed due to rheumatoid arthritis.    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 Use with Albuterol.    Hypoxia       order for DME     1 each    Equipment being ordered: Dispense baffle, for use with nebulizer.    Pneumonia of left upper lobe due to Mycoplasma pneumoniae       * order for DME     1 each    Dispense SP Walker-small    Pain of toe of right foot, Status post bunionectomy       PRESERVISION AREDS PO      Take 1 tablet by mouth daily        ranibizumab 0.3 MG/0.05ML Soln    LUCENTIS     0.3 mg by Intravitreal route        ranitidine 150 MG tablet    ZANTAC    60 tablet    Take 1 tablet (150 mg) by mouth 2 times daily    Gastroesophageal reflux disease without esophagitis       REFRESH P.M. Oint      Apply  to eye. Daily at bedtime        saccharomyces boulardii 250 MG capsule    FLORASTOR     Take 250 mg by mouth daily        senna-docusate 8.6-50 MG per tablet    SENOKOT-S;PERICOLACE    120 tablet    Take 2 tablets by mouth At Bedtime Hold if diarrhea occurs.    Constipation, chronic       triamcinolone 0.1 % cream    KENALOG    453.6 g    Apply sparingly to affected area on face three times daily.    Facial lesion       VITAMIN C PO           ZYRTEC ALLERGY 10 MG tablet   Generic drug:  cetirizine      Take 10 mg by mouth At Bedtime        * Notice:  This list has 3 medication(s) that are the same as other medications prescribed for you. Read the directions carefully, and ask your doctor or other care provider to review them with you.

## 2018-02-27 NOTE — PROGRESS NOTES
CARDIOLOGY CLINIC FOLLOW UP    Referring Provider:  Established Patient  Primary Care Provider:   Marianne Basurto    HPI: Linda Spicer is a 86 year old female who returns to the cardiology clinic for ongoing evaluation of HFpEF and recent paroxysmal atrial flutter.  The patient's risk factor profile is: (+) HTN, (-) diabetes, (-) hyperlipidemia, (-) tobacco use, (+) family Hx CAD [mother, sister]. The patient was diagnosed with HFpEF in Oct 2014.  She had a dobutamine ECHO (Oct 2014) that was normal.  She had a RHC in Oct 2014 that showed normal hemodynamics at baseline although the CO was mildly depressed.  With fluid challenge, the left sided filling pressures dramatically increased. The results of her prior dobutamine ECHO and RHC are noted below; she has not undergone coronary angiography.    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 She had PFTs in Aug 2015 that showed moderate lung diffusion defect.  She did not desaturate after walking 6 minutes.  Inhalers have not provided any benefit.   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  She has been recuperating gradually.    She presented in August 2016 with chest pain, dizziness, and lightheadedness.  She was in atrial flutter at that time but converted back to NSR while in the ER.  She was started on metoprolol XL 12.5 mg daily, losartan was decreased to 50 mg daily, and spironolactone 25 mg qd was continued.  Her ECG did not show ischemic changes and her troponins were negative.  She was treated on heparin but anticoagulation was deferred and she was given ASA 81 mg qd.  She was switched from ASA to Eliquis in Nov 2016.    In Feb 2017 she was started to have recurrent atrial flutter.  She was treated with higher dose of beta blocker but developed bradycardia and near syncope and her beta blocker was stopped.   She returned to [facility] with tachycardia and a pacemaker was placed and she was restarted on Toprol XL 12.5 mg qd.  Her losartan was decreased to 25 mg qd and her spironolactone was continued at 25 mg qd.    She was seen at [facility] in July 2017 with chest pain.  It had been occurring in the setting of resuming an exercise program and the left sided chest pain was attributed to musculoskeletal pain. ECG showed atrial pacing.    .  Troponin < 0.045. CXR negative.    She returned to the ER in Aug 2017 after mixing up her medications.  Her Toprol XL had been increased to 25 mg qd but she had accidentally taken Losartan 50 mg qd instead of 25 mg qd.  She noticed HR had increased to 100-110 and she had lightheadedness.  She was returned to Toprol XL 12.5 mg qd and Losartan 25 mg qHS, and Spironolactone 25 mg qd.    She had recurrent ER visits in Nov 2017, Jan 2018, and most recently Feb 2018.  She felt palpitations, lightheadedness, and shortness of breath.  She believes she was in atrial flutter on each occasion. Her pacemaker was interrogated on 2/21/18:    Data shows 5 AT/AF episodes indicating atrial arrhythmias. This equals 7% of the time since 1/15/18. Longest episodes listed is 3 days with atrial rates up to >400bpm. Current episode appears to have started today, 2/21/18, at 0517. Vrates during AT/AF are <110bpm about 95%. Patient has a hx of PAF.  Data shows 3 non-sustained VT episodes, 1-3sec, Vrates 162-175bpm, all episodes were 1/24/18@1044 and were actually the same episode of NSVT lasting ~6 sec. Atrial pacing at 72.1%.  Ventricular pacing at 5.1%.  Presenting rhythm is AS- (AF).    She was discharged from the ER on Apixaban 2.5 BID and Toprol XL 25 mg qd.  Her home BP have been 116-192/58-93 mm Hg and HR 60 - 66.  Her Losartan, previously 25 mg qAM, was switch to 50 mg qHS.      The patient continues to experience SOB when she speaks and she begins to hyperventilate.  She also notes RUSSELL with walking and  she has to pace herself.  The symptoms begin within seconds of activity.  She denies PND, orthopnea, pedal edema.  She denies palpitations.  She continues to experience lightheadedness.  No syncope.    She was seen by Dr. Comer this past week.  The six-minute walk distance (600 ft) is reduced. Her O2 saturation was 96%.  Mild restriction defect.  Mild diffusion defect. The diffusing capacity was not corrected for the patient's hemoglobin.  She remains compliant with her medications.  Of note, she had ABP with pH 7.50 and pCO2 30 confirming hyperventilation in 2014.    Her BPs are borderline elevated 125-145/50-65 mm Hg.  Her HR is in the 60-80 range.    PAST MEDICAL HISTORY:  Past Medical History:   Diagnosis Date     Adjustment disorder with anxious mood 5/18/2015     Advanced directives, counseling/discussion 8/30/2012    Patient states has Advance Directive and will bring in a copy to clinic. 8/30/2012   [facility] Assistant \       Anemia 9/25/2015     Basal cell cancer      CHF (congestive heart failure) (H) 9/18/2014     CKD (chronic kidney disease) stage 3, GFR 30-59 ml/min 9/29/2015     DDD (degenerative disc disease), lumbar 9/25/2015     Diffuse idiopathic pulmonary fibrosis (H) 5/6/2013     Encounter for palliative care 5/18/2015     History of blood transfusion 9/29/2015     Hypertension goal BP (blood pressure) < 140/90 9/7/2012     Hypothyroid 9/7/2012     Irritable bowel syndrome 10/29/2013     Macular degeneration      Macular degeneration, 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     Nondisplaced spiral fracture of shaft of humerus      Osteoporosis 8/13/2013     [employer] Update utility     RA (rheumatoid arthritis) (H) 5/7/2013     Rheumatic fever      Shingles      Spinal stenosis of lumbar region with neurogenic claudication 9/14/2015       CURRENT MEDICATIONS:  Current Outpatient Prescriptions   Medication Sig Dispense Refill     apixaban ANTICOAGULANT (ELIQUIS) 2.5 MG tablet Take 1 tablet (2.5 mg)  by mouth 2 times daily 180 tablet 3     LOSARTAN POTASSIUM PO Take 25 mg by mouth every evening       loratadine (CLARITIN) 10 MG tablet Take 10 mg by mouth every morning       bumetanide (BUMEX) 1 MG tablet Take 1 tablet (1 mg) by mouth every morning 30 tablet 11     bismuth subsalicylate (PEPTO BISMOL) 262 MG chewable tablet Take 2 tablets (524 mg) by mouth 4 times daily as needed 100 tablet 11     metoprolol (LOPRESSOR) 25 MG tablet Take 1 tablet (25 mg) by mouth 2 times daily 60 tablet 5     folic acid (FOLVITE) 1 MG tablet Take 1 tablet (1 mg) by mouth daily 90 tablet 3     hydrocortisone 2.5 % cream Apply BID to affected region(s) for 7-10 days. 30 g 0     levothyroxine (SYNTHROID/LEVOTHROID) 75 MCG tablet TAKE ONE TABLET BY MOUTH EVERY DAY 90 tablet 2     ranitidine (ZANTAC) 150 MG tablet Take 1 tablet (150 mg) by mouth 2 times daily 60 tablet 5     azaTHIOprine (IMURAN) 50 MG tablet Take 1 tablet (50 mg) by mouth daily 90 tablet 2     order for DME Dispense SP Walker-small 1 each 0     ranibizumab (LUCENTIS) 0.3 MG/0.05ML SOLN 0.3 mg by Intravitreal route       MetroNIDAZOLE (FLAGYL PO) Take 500 mg by mouth 2 times daily        senna-docusate (SENOKOT-S;PERICOLACE) 8.6-50 MG per tablet Take 2 tablets by mouth At Bedtime Hold if diarrhea occurs. 120 tablet 11     Cranberry 180 MG CAPS Take 1 capsule by mouth daily Unsure of strength       order for DME Equipment being ordered: Dispense baffle, for use with nebulizer. 1 each 0     order for DME Equipment being ordered: Nebulizer. Use with Albuterol. 1 each 0     order for DME Equipment being ordered: Dispense face mask.  Mrs. Spicer is immunosuppressed due to rheumatoid arthritis. 1 Box 11     triamcinolone (KENALOG) 0.1 % cream Apply sparingly to affected area on face three times daily. 453.6 g 5     Multiple Vitamins-Minerals (PRESERVISION AREDS PO) Take 1 tablet by mouth daily        Blood Pressure Monitor KIT 1 kit daily as needed 1 kit 0     nitroglycerin  (NITROSTAT) 0.4 MG SL tablet Place 1 tablet (0.4 mg) under the tongue every 5 minutes as needed for chest pain 25 tablet 0     cetirizine (ZYRTEC ALLERGY) 10 MG tablet Take 10 mg by mouth At Bedtime       Hypromellose (GENTEAL MILD OP) Apply  to eye daily.       Artificial Tear Ointment (REFRESH P.M.) OINT Apply  to eye. Daily at bedtime          calcium-vitamin D (CALTRATE) 600-400 MG-UNIT per tablet Take 1 tablet by mouth 2 times daily        fish oil-omega-3 fatty acids (FISH OIL) 1000 MG capsule Take 2 g by mouth daily. 2 capsules daily              PAST SURGICAL HISTORY:  Past Surgical History:   Procedure Laterality Date     APPENDECTOMY       BIOPSY      hemorrhoidectomy     ENT SURGERY      tonsillectomy     GYN SURGERY      3 D & C's     HYSTERECTOMY, PAP NO LONGER INDICATED       LAMINECTOMY LUMBAR ONE LEVEL N/A 10/13/2015    Procedure: LAMINECTOMY LUMBAR ONE LEVEL;  Surgeon: Fransico Toussaint MD;  Location: [facility] OR       ALLERGIES  Cephalexin hcl; Gabapentin; Naproxen; Perfume; Lactase; Macrobid [nitrofurantoin anhydrous]; Sulfa drugs; and Xanax [alprazolam]    FAMILY HX:  Family History   Problem Relation Age of Onset     Hypertension Mother      Psychotic Disorder Father      DIABETES Son      DIABETES Daughter      Blood Disease Daughter        SOCIAL HX:  History     Social History     Marital Status:      Spouse Name: N/A     Number of Children: N/A     Years of Education: N/A     Social History Main Topics     Smoking status: Never Smoker      Smokeless tobacco: Never Used     Alcohol Use: Yes      Comment: rare wine      Drug Use: No     Sexual Activity: No     Other Topics Concern     None     Social History Narrative    . Has six children. She enjoys [employer] and [employer]. Her  has cancer. Her son has financial and alcohol issues.       ROS:  Constitutional: No fever, chills, or sweats. No weight gain/loss.   HEENT: No visual disturbance, ear ache, epistaxis, sore throat.  "  Allergies/Immunologic: Negative.   Respiratory:(+) cough productive of clear sputum, (-) hemoptysis.   Cardiovascular: As per HPI.   GI: No nausea, vomiting, hematemesis, melena, or hematochezia.   : No urinary frequency, dysuria, or hematuria.   Integument: No rash.   Psychiatric: No anxiety / depression.   Neuro: No speech disturbance, focal sensory or motor deficit.   Endocrinology: No polyuria / polyphagia.   Musculoskeletal: No myalgia.    VITAL SIGNS:  Ht 1.626 m (5' 4\")  Wt 75.3 kg (165 lb 14.4 oz)  BMI 28.48 kg/m2  Body mass index is 28.48 kg/(m^2).  Wt Readings from Last 2 Encounters:   18 75.3 kg (165 lb 14.4 oz)   18 73.8 kg (162 lb 9.6 oz)       PHYSICAL EXAM  Linda Spicer is a 86 year old female.in no acute distress.  HEENT: Eyes Nonicteric.  Neck: JVP normal.  Carotids +3/3 bilaterally without bruits.  Lungs: Bibasilar crackles about 1/3 the way up, unchanged. Lower lobes crackles in bases bilaterally. Cor: RRR. Normal S1 and S2.  No murmur, rub, or gallop.  PMI in Lf 5th ICS.  Abd: Soft, nontender, nondistended.  NABS.  No pulsatile mass.  Extremities: No C/C.  Trace edema.  Pulses +3/3 symmetric in upper and lower extremities.  Neuro: Grossly intact.  Psych: A&O x 3.  Skin: No rash.    LABS  None    Recent Labs   Lab Test  17   1214  16   0756  14   0821   CHOL  146  171  155   HDL  50  47*  42*   LDL  76  109*  97   TRIG  101  75  84   CHOLHDLRATIO   --    --   3.7       EK18  Atrial paced rhythm, prolonged conduction.  Nonspecific ST-T wave changes.    EK16  SB at 54.  Intervals: 0.17/0.076/0.404.  Isolated Q wave in III.  No other abnormalities.    EK/7/15  SB at 49.  Q waves in III and aVF.  No change from ECG () by report but I am not able to locate this or any other ECGs.    ECHO: None    DOBUTAMINE ECHO STRESS TEST:  14  Interpretation Summary  The EKG portion of this stress test was negative for inducible ischemia (see " echo results below). Normal resting wall motion and no stress-induced wall motion abnormality.    Baseline  Normal baseline electrocardiogram.  Normal left ventricular wall motion.    Stress  The maximum dose of dobutamine was 20mcg/kg/min.  Target Heart Rate was achieved.  The EKG portion of this stress test was negative for inducible ischemia (see echo results below).  Arrhythmia induced during stress: occasional PVC's and PAC's.  Normal resting wall motion and no stress-induced wall motion abnormality.    Left Ventricle  There is mild concentric left ventricular hypertrophy.  Left ventricular systolic function is normal.    Right Ventricle  The right ventricle is normal in size and function.    Atria  The left atrium is moderate to severely dilated.    Mitral Valve  There is mild (1+) mitral regurgitation.    Tricuspid Valve  There is mild (1+) tricuspid regurgitation.  The right ventricular systolic pressure is approximated at 39 mmHg plus the   right atrial pressure.    Aortic Valve  There is trace aortic regurgitation.    Pericardial/Pleural  There is no pericardial effusion.      RIGHT HEART CATH: 9/22/14  RA 3  RV 36/3  PA 36/13 (mean 21)  PCW 10  Fluid challenge 500 ml NS --> PA 46/18 (mean 27 mm Hg), PCW 18 with prominent v waves    ECHO: 5/26/2016   1.  LV function is normal. The visually estimated ejection fraction is 65%.   2. No regional wall motion abnormalities.   3. Moderately enlarged right ventricle with mild decreased RV functions.   4. Diastolic filling consistent with diastolic dysfunction.   5. Moderate mitral valve regurgitation.   6. Moderately dilated left atrium.   7. Pulmonary hypertension is present.   8. Moderately elevated pulmonary pressure estimated at 40.0 mmHg plus right atrial pressure.    LEXISCAN:  5/27/16  1. Typical Lexiscan induced chest discomfort.  2. Typical symptoms with Lexiscan infusion; probable adequate vasodilation response.  3. Normal stress EKG.  4. Abnormal  baseline blood pressure 207/71.  5. Ejection fraction 76%.  6. No discrete regional wall motion abnormalities are identified.  7. Normal myocardial perfusion with no evidence for focal myocardial ischemia or myocardial infarction.      PFTs  (8/21/15)  The six-minute walk distance is normal.  There is no significant desaturation during the six-minute walk done on room air.  No significant airflow obstruction.  Moderate Diffusion Defect   Compared with testing from 1/15/2015 there has been an increase in the FVC.    CORONARY ANGIOGRAPHY:  None    ARTERIAL US LLE:  12/2/17  No evidence of significant arterial occlusive disease in the left leg.    CT chest w/o contrast (2/20/2017)  IMPRESSION:   Interval increase in peripheral lower lobe predominant fibrotic  changes with traction bronchiectasis in a UIP pattern. Redemonstrated  honeycombing with interval enlargement of cystic change.      ASSESSMENT AND PLAN:   1. Heart Failure, preserved LVEF.  The patient has significant RUSSELL, occurring with minimal activities. A component of this may be related to HFpEF.  Previously, I performed RHC and the left sided filling pressures were normal.  She tried a low dose diuretic without impact.  Continue Losartan 50 mg qHS and Toprol XL 25 mg qAM.  We will monitor for worsening pulmonary symptoms on the beta blocker  Continue with light exercise. Avoid added salt and processed foods.  Of note, the RHC essentially ruled out pulmonary artery hypertension but that was 3 years ago.  A repeat RHC may be required.    2. Atrial Flutter, Paroxysmal.  I would equate paroxysmal atrial flutter to PAF in regard to risk of stroke or systemic embolism.   The patient has a CHADS2-Vasc score of 4, corresponding to a 4.0% annual stroke / systemic emolism event rate. The patient has Stage III CKD with GFR 39 - 42 ml/min.  She is on Eliquis 2.5 mg BID given the borderline Cr and the age > 80 yrs.  She has concerns over the interaction of coumadin with  her food. Pacemaker interrogation per protocol, at [place].  She has been atrial pacing 72% of the time and has had 5 atrial tach episodes, the longest of which was 3 days.  However, the symptoms do not correlate with the arrhythmia.      3. Rheumatoid Pulmonary Fibrosis.  Per Dr. Comer, she had severe dyspnea and hyperventilation in the past now showing significant improvement in ventilatory control and symptoms and improvement in pulmonary function and exercise tolerance.  There was a concern that patient was destaturating but she did not do so on 6 minute walk test.  She is up to date on vaccinations and flu shots.  F/U with Dr. Comer prn.      4. Hypertension.  Well controlled on combination of Toprol, and Losartan.  Restrict Na.  No new changes.    5. Hyperventilation.  I think this may explain a significant portion of her symptoms.  It was confirmed on ABG.  Clearly, with minimal effort, as she begins to speak, the symptoms develop.  I would like to try a pulmonary rehabilitation consultation.  I would like to try a course of Ativan for one week, 1 mg BID for one week in the event anxiety is a significant component.Walked through the cooridor and her sats remained 96 or greater.    FOLLOW UP:  6 months    ADDENDUM (7/30/18): Records from [facility] reviewed (Dr. Alanis).  Feb 2017: Presented with syncope due to sick sinus syndrome with bradycardia tachycardia syndrome associated with paroxysmal atrial fibrillation with fast ventricular response.  Permanent pacemaker placed. Anticoagulated with eliquis, beta blocker started.  Subsequently started on Propofenone.  Patient has Hx pulmonary fibrosis (1973) so Amio would not be good option.      Emeterio Loza MD    Divisions of Cardiology  [place], [place]    CC  ESTABLISHED PATIENT

## 2018-02-27 NOTE — LETTER
2/27/2018      RE: Linda Spicer  [address]   [facility] [id number]       Dear Colleague,    Thank you for the opportunity to participate in the care of your patient, Linda Spicer, at the [facility] at [facility]. Please see a copy of my visit note below.    CARDIOLOGY CLINIC FOLLOW UP    Referring Provider:  Established Patient  Primary Care Provider:   Marianne Basurto    HPI: Linda Spicer is a 86 year old female who returns to the cardiology clinic for ongoing evaluation of HFpEF and recent paroxysmal atrial flutter.  The patient's risk factor profile is: (+) HTN, (-) diabetes, (-) hyperlipidemia, (-) tobacco use, (+) family Hx CAD [mother, sister]. The patient was diagnosed with HFpEF in Oct 2014.  She had a dobutamine ECHO (Oct 2014) that was normal.  She had a RHC in Oct 2014 that showed normal hemodynamics at baseline although the CO was mildly depressed.  With fluid challenge, the left sided filling pressures dramatically increased. The results of her prior dobutamine ECHO and RHC are noted below; she has not undergone coronary angiography.    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 She had PFTs in Aug 2015 that showed moderate lung diffusion defect.  She did not desaturate after walking 6 minutes.  Inhalers have not provided any benefit.   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  She has been recuperating gradually.    She presented in August 2016 with chest pain, dizziness, and lightheadedness.  She was in atrial flutter at that time but converted back to NSR while in the ER.  She was started on metoprolol XL 12.5 mg daily, losartan was decreased to 50 mg daily, and spironolactone 25 mg qd was continued.  Her ECG did not show ischemic changes and her troponins were negative.   She was treated on heparin but anticoagulation was deferred and she was given ASA 81 mg qd.  She was switched from ASA to Eliquis in Nov 2016.    In Feb 2017 she was started to have recurrent atrial flutter.  She was treated with higher dose of beta blocker but developed bradycardia and near syncope and her beta blocker was stopped.  She returned to [facility] [place] with tachycardia and a pacemaker was placed and she was restarted on Toprol XL 12.5 mg qd.  Her losartan was decreased to 25 mg qd and her spironolactone was continued at 25 mg qd.    She was seen at [facility] [place] ER in July 2017 with chest pain.  It had been occurring in the setting of resuming an exercise program and the left sided chest pain was attributed to musculoskeletal pain. ECG showed atrial pacing.    .  Troponin < 0.045. CXR negative.    She returned to the ER in Aug 2017 after mixing up her medications.  Her Toprol XL had been increased to 25 mg qd but she had accidentally taken Losartan 50 mg qd instead of 25 mg qd.  She noticed HR had increased to 100-110 and she had lightheadedness.  She was returned to Toprol XL 12.5 mg qd and Losartan 25 mg qHS, and Spironolactone 25 mg qd.    She had recurrent ER visits in Nov 2017, Jan 2018, and most recently Feb 2018.  She felt palpitations, lightheadedness, and shortness of breath.  She believes she was in atrial flutter on each occasion. Her pacemaker was interrogated on 2/21/18:    Data shows 5 AT/AF episodes indicating atrial arrhythmias. This equals 7% of the time since 1/15/18. Longest episodes listed is 3 days with atrial rates up to >400bpm. Current episode appears to have started today, 2/21/18, at 0517. Vrates during AT/AF are <110bpm about 95%. Patient has a hx of PAF.  Data shows 3 non-sustained VT episodes, 1-3sec, Vrates 162-175bpm, all episodes were 1/24/18@1044 and were actually the same episode of NSVT lasting ~6 sec. Atrial pacing at 72.1%.  Ventricular pacing at 5.1%.  Presenting rhythm is AS-  (AF).    She was discharged from the ER on Apixaban 2.5 BID and Toprol XL 25 mg qd.  Her home BP have been 116-192/58-93 mm Hg and HR 60 - 66.  Her Losartan, previously 25 mg qAM, was switch to 50 mg qHS.      The patient continues to experience SOB when she speaks and she begins to hyperventilate.  She also notes RUSSELL with walking and she has to pace herself.  The symptoms begin within seconds of activity.  She denies PND, orthopnea, pedal edema.  She denies palpitations.  She continues to experience lightheadedness.  No syncope.    She was seen by Dr. Comer this past week.  The six-minute walk distance (600 ft) is reduced. Her O2 saturation was 96%.  Mild restriction defect.  Mild diffusion defect. The diffusing capacity was not corrected for the patient's hemoglobin.  She remains compliant with her medications.  Of note, she had ABP with pH 7.50 and pCO2 30 confirming hyperventilation in 2014.    Her BPs are borderline elevated 125-145/50-65 mm Hg.  Her HR is in the 60-80 range.    PAST MEDICAL HISTORY:  Past Medical History:   Diagnosis Date     Adjustment disorder with anxious mood 5/18/2015     Advanced directives, counseling/discussion 8/30/2012    Patient states has Advance Directive and will bring in a copy to clinic. 8/30/2012   Tevin May  [facility] Medical Assistant \       Anemia 9/25/2015     Basal cell cancer      CHF (congestive heart failure) (H) 9/18/2014     CKD (chronic kidney disease) stage 3, GFR 30-59 ml/min 9/29/2015     DDD (degenerative disc disease), lumbar 9/25/2015     Diffuse idiopathic pulmonary fibrosis (H) 5/6/2013     Encounter for palliative care 5/18/2015     History of blood transfusion 9/29/2015     Hypertension goal BP (blood pressure) < 140/90 9/7/2012     Hypothyroid 9/7/2012     Irritable bowel syndrome 10/29/2013     Macular degeneration      Macular degeneration, 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     Nondisplaced spiral fracture of shaft of humerus      Osteoporosis 8/13/2013     o  Update utility     RA (rheumatoid arthritis) (H) 5/7/2013     Rheumatic fever      Shingles      Spinal stenosis of lumbar region with neurogenic claudication 9/14/2015       CURRENT MEDICATIONS:  Current Outpatient Prescriptions   Medication Sig Dispense Refill     apixaban ANTICOAGULANT (ELIQUIS) 2.5 MG tablet Take 1 tablet (2.5 mg) by mouth 2 times daily 180 tablet 3     LOSARTAN POTASSIUM PO Take 25 mg by mouth every evening       loratadine (CLARITIN) 10 MG tablet Take 10 mg by mouth every morning       bumetanide (BUMEX) 1 MG tablet Take 1 tablet (1 mg) by mouth every morning 30 tablet 11     bismuth subsalicylate (PEPTO BISMOL) 262 MG chewable tablet Take 2 tablets (524 mg) by mouth 4 times daily as needed 100 tablet 11     metoprolol (LOPRESSOR) 25 MG tablet Take 1 tablet (25 mg) by mouth 2 times daily 60 tablet 5     folic acid (FOLVITE) 1 MG tablet Take 1 tablet (1 mg) by mouth daily 90 tablet 3     hydrocortisone 2.5 % cream Apply BID to affected region(s) for 7-10 days. 30 g 0     levothyroxine (SYNTHROID/LEVOTHROID) 75 MCG tablet TAKE ONE TABLET BY MOUTH EVERY DAY 90 tablet 2     ranitidine (ZANTAC) 150 MG tablet Take 1 tablet (150 mg) by mouth 2 times daily 60 tablet 5     azaTHIOprine (IMURAN) 50 MG tablet Take 1 tablet (50 mg) by mouth daily 90 tablet 2     order for DME Dispense SP Walker-small 1 each 0     ranibizumab (LUCENTIS) 0.3 MG/0.05ML SOLN 0.3 mg by Intravitreal route       MetroNIDAZOLE (FLAGYL PO) Take 500 mg by mouth 2 times daily        senna-docusate (SENOKOT-S;PERICOLACE) 8.6-50 MG per tablet Take 2 tablets by mouth At Bedtime Hold if diarrhea occurs. 120 tablet 11     Cranberry 180 MG CAPS Take 1 capsule by mouth daily Unsure of strength       order for DME Equipment being ordered: Dispense baffle, for use with nebulizer. 1 each 0     order for DME Equipment being ordered: Nebulizer. Use with Albuterol. 1 each 0     order for DME Equipment being ordered: Dispense face mask.  Mrs.  Dannielle is immunosuppressed due to rheumatoid arthritis. 1 Box 11     triamcinolone (KENALOG) 0.1 % cream Apply sparingly to affected area on face three times daily. 453.6 g 5     Multiple Vitamins-Minerals (PRESERVISION AREDS PO) Take 1 tablet by mouth daily        Blood Pressure Monitor KIT 1 kit daily as needed 1 kit 0     nitroglycerin (NITROSTAT) 0.4 MG SL tablet Place 1 tablet (0.4 mg) under the tongue every 5 minutes as needed for chest pain 25 tablet 0     cetirizine (ZYRTEC ALLERGY) 10 MG tablet Take 10 mg by mouth At Bedtime       Hypromellose (GENTEAL MILD OP) Apply  to eye daily.       Artificial Tear Ointment (REFRESH P.M.) OINT Apply  to eye. Daily at bedtime          calcium-vitamin D (CALTRATE) 600-400 MG-UNIT per tablet Take 1 tablet by mouth 2 times daily        fish oil-omega-3 fatty acids (FISH OIL) 1000 MG capsule Take 2 g by mouth daily. 2 capsules daily              PAST SURGICAL HISTORY:  Past Surgical History:   Procedure Laterality Date     APPENDECTOMY       BIOPSY      hemorrhoidectomy     ENT SURGERY      tonsillectomy     GYN SURGERY      3 D & C's     HYSTERECTOMY, PAP NO LONGER INDICATED       LAMINECTOMY LUMBAR ONE LEVEL N/A 10/13/2015    Procedure: LAMINECTOMY LUMBAR ONE LEVEL;  Surgeon: Fransico Toussaint MD;  Location: [facility] OR       ALLERGIES  Cephalexin hcl; Gabapentin; Naproxen; Perfume; Lactase; Macrobid [nitrofurantoin anhydrous]; Sulfa drugs; and Xanax [alprazolam]    FAMILY HX:  Family History   Problem Relation Age of Onset     Hypertension Mother      Psychotic Disorder Father      DIABETES Son      DIABETES Daughter      Blood Disease Daughter        SOCIAL HX:  History     Social History     Marital Status:      Spouse Name: N/A     Number of Children: N/A     Years of Education: N/A     Social History Main Topics     Smoking status: Never Smoker      Smokeless tobacco: Never Used     Alcohol Use: Yes      Comment: rare wine      Drug Use: No     Sexual Activity:  "No     Other Topics Concern     None     Social History Narrative    . Has six children. She enjoys bridge and genealogy. Her  has cancer. Her son has financial and alcohol issues.       ROS:  Constitutional: No fever, chills, or sweats. No weight gain/loss.   HEENT: No visual disturbance, ear ache, epistaxis, sore throat.   Allergies/Immunologic: Negative.   Respiratory:(+) cough productive of clear sputum, (-) hemoptysis.   Cardiovascular: As per HPI.   GI: No nausea, vomiting, hematemesis, melena, or hematochezia.   : No urinary frequency, dysuria, or hematuria.   Integument: No rash.   Psychiatric: No anxiety / depression.   Neuro: No speech disturbance, focal sensory or motor deficit.   Endocrinology: No polyuria / polyphagia.   Musculoskeletal: No myalgia.    VITAL SIGNS:  Ht 1.626 m (5' 4\")  Wt 75.3 kg (165 lb 14.4 oz)  BMI 28.48 kg/m2  Body mass index is 28.48 kg/(m^2).  Wt Readings from Last 2 Encounters:   18 75.3 kg (165 lb 14.4 oz)   18 73.8 kg (162 lb 9.6 oz)       PHYSICAL EXAM  Linda Spicer is a 86 year old female.in no acute distress.  HEENT: Eyes Nonicteric.  Neck: JVP normal.  Carotids +3/3 bilaterally without bruits.  Lungs: Bibasilar crackles about 1/3 the way up, unchanged. Lower lobes crackles in bases bilaterally. Cor: RRR. Normal S1 and S2.  No murmur, rub, or gallop.  PMI in Lf 5th ICS.  Abd: Soft, nontender, nondistended.  NABS.  No pulsatile mass.  Extremities: No C/C.  Trace edema.  Pulses +3/3 symmetric in upper and lower extremities.  Neuro: Grossly intact.  Psych: A&O x 3.  Skin: No rash.    LABS  None    Recent Labs   Lab Test  17   1214  16   0756  14   0821   CHOL  146  171  155   HDL  50  47*  42*   LDL  76  109*  97   TRIG  101  75  84   CHOLHDLRATIO   --    --   3.7       EK18  Atrial paced rhythm, prolonged conduction.  Nonspecific ST-T wave changes.    EK16  SB at 54.  Intervals: 0.17/0.076/0.404.  Isolated Q " wave in III.  No other abnormalities.    EK/7/15  SB at 49.  Q waves in III and aVF.  No change from ECG (2012) by report but I am not able to locate this or any other ECGs.    ECHO: None    DOBUTAMINE ECHO STRESS TEST:  14  Interpretation Summary  The EKG portion of this stress test was negative for inducible ischemia (see echo results below). Normal resting wall motion and no stress-induced wall motion abnormality.    Baseline  Normal baseline electrocardiogram.  Normal left ventricular wall motion.    Stress  The maximum dose of dobutamine was 20mcg/kg/min.  Target Heart Rate was achieved.  The EKG portion of this stress test was negative for inducible ischemia (see echo results below).  Arrhythmia induced during stress: occasional PVC's and PAC's.  Normal resting wall motion and no stress-induced wall motion abnormality.    Left Ventricle  There is mild concentric left ventricular hypertrophy.  Left ventricular systolic function is normal.    Right Ventricle  The right ventricle is normal in size and function.    Atria  The left atrium is moderate to severely dilated.    Mitral Valve  There is mild (1+) mitral regurgitation.    Tricuspid Valve  There is mild (1+) tricuspid regurgitation.  The right ventricular systolic pressure is approximated at 39 mmHg plus the   right atrial pressure.    Aortic Valve  There is trace aortic regurgitation.    Pericardial/Pleural  There is no pericardial effusion.      RIGHT HEART CATH: 14  RA 3  RV 36/3  PA 36/13 (mean 21)  PCW 10  Fluid challenge 500 ml NS --> PA 46/18 (mean 27 mm Hg), PCW 18 with prominent v waves    ECHO: 2016   1.  LV function is normal. The visually estimated ejection fraction is 65%.   2. No regional wall motion abnormalities.   3. Moderately enlarged right ventricle with mild decreased RV functions.   4. Diastolic filling consistent with diastolic dysfunction.   5. Moderate mitral valve regurgitation.   6. Moderately dilated left  atrium.   7. Pulmonary hypertension is present.   8. Moderately elevated pulmonary pressure estimated at 40.0 mmHg plus right atrial pressure.    LEXISCAN:  5/27/16  1. Typical Lexiscan induced chest discomfort.  2. Typical symptoms with Lexiscan infusion; probable adequate vasodilation response.  3. Normal stress EKG.  4. Abnormal baseline blood pressure 207/71.  5. Ejection fraction 76%.  6. No discrete regional wall motion abnormalities are identified.  7. Normal myocardial perfusion with no evidence for focal myocardial ischemia or myocardial infarction.      PFTs  (8/21/15)  The six-minute walk distance is normal.  There is no significant desaturation during the six-minute walk done on room air.  No significant airflow obstruction.  Moderate Diffusion Defect   Compared with testing from 1/15/2015 there has been an increase in the FVC.    CORONARY ANGIOGRAPHY:  None    ARTERIAL US LLE:  12/2/17  No evidence of significant arterial occlusive disease in the left leg.    CT chest w/o contrast (2/20/2017)  IMPRESSION:   Interval increase in peripheral lower lobe predominant fibrotic  changes with traction bronchiectasis in a UIP pattern. Redemonstrated  honeycombing with interval enlargement of cystic change.      ASSESSMENT AND PLAN:   1. Heart Failure, preserved LVEF.  The patient has significant RUSSELL, occurring with minimal activities. A component of this may be related to HFpEF.  Previously, I performed RHC and the left sided filling pressures were normal.  She tried a low dose diuretic without impact.  Continue Losartan 50 mg qHS and Toprol XL 25 mg qAM.  We will monitor for worsening pulmonary symptoms on the beta blocker  Continue with light exercise. Avoid added salt and processed foods.  Of note, the RHC essentially ruled out pulmonary artery hypertension but that was 3 years ago.  A repeat RHC may be required.    2. Atrial Flutter, Paroxysmal.  I would equate paroxysmal atrial flutter to PAF in regard to  risk of stroke or systemic embolism.   The patient has a CHADS2-Vasc score of 4, corresponding to a 4.0% annual stroke / systemic emolism event rate. The patient has Stage III CKD with GFR 39 - 42 ml/min.  She is on Eliquis 2.5 mg BID given the borderline Cr and the age > 80 yrs.  She has concerns over the interaction of coumadin with her food. Pacemaker interrogation per protocol, at [place].  She has been atrial pacing 72% of the time and has had 5 atrial tach episodes, the longest of which was 3 days.  However, the symptoms do not correlate with the arrhythmia.      3. Rheumatoid Pulmonary Fibrosis.  Per Dr. Comer, she had severe dyspnea and hyperventilation in the past now showing significant improvement in ventilatory control and symptoms and improvement in pulmonary function and exercise tolerance.  There was a concern that patient was destaturating but she did not do so on 6 minute walk test.  She is up to date on vaccinations and flu shots.  F/U with Dr. Comer prn.      4. Hypertension.  Well controlled on combination of Toprol, and Losartan.  Restrict Na.  No new changes.    5. Hyperventilation.  I think this may explain a significant portion of her symptoms.  It was confirmed on ABG.  Clearly, with minimal effort, as she begins to speak, the symptoms develop.  I would like to try a pulmonary rehabilitation consultation.  I would like to try a course of Ativan for one week, 1 mg BID for one week in the event anxiety is a significant component.Walked through the cooridor and her sats remained 96 or greater.    FOLLOW UP:  6 months      Emeterio Loza MD    Divisions of Cardiology  [place], [place]

## 2018-02-27 NOTE — NURSING NOTE
Chief Complaint   Patient presents with     Follow Up For     hospital follow up at [facility] for palpitations/last visit at  on 9/11/2017     Vitals were taken and medications were reconciled. EKG was performed    Swathi Stacy MA    12:57 PM

## 2018-02-28 LAB — INTERPRETATION ECG - MUSE: NORMAL

## 2018-03-02 ENCOUNTER — OFFICE VISIT (OUTPATIENT)
Dept: UROLOGY | Facility: CLINIC | Age: 83
End: 2018-03-02
Payer: MEDICARE

## 2018-03-02 VITALS — OXYGEN SATURATION: 99 % | DIASTOLIC BLOOD PRESSURE: 60 MMHG | HEART RATE: 61 BPM | SYSTOLIC BLOOD PRESSURE: 129 MMHG

## 2018-03-02 DIAGNOSIS — N39.0 RECURRENT UTI: ICD-10-CM

## 2018-03-02 DIAGNOSIS — N95.2 ATROPHIC VAGINITIS: Primary | ICD-10-CM

## 2018-03-02 PROCEDURE — 99214 OFFICE O/P EST MOD 30 MIN: CPT | Performed by: PHYSICIAN ASSISTANT

## 2018-03-02 ASSESSMENT — PAIN SCALES - GENERAL: PAINLEVEL: NO PAIN (0)

## 2018-03-02 NOTE — PATIENT INSTRUCTIONS
UROLOGY CLINIC VISIT PATIENT INSTRUCTIONS    Avoid checking urine samples if you do not have any urinary symptoms (burning with urination, frequency, urgency, blood in the urine, etc.).    You SHOULD have a urine sample checked if you have any of the above symptoms or back pain, abdominal pain, fevers, chills, nausea, vomiting, or increased confusion.     Continue to take a daily probiotic as you are doing.     Start using vaginal estrogen cream. We will send a prescription to [facility] who will call you to confirm your address.   -Once the cream arrives in the mail, apply a dime size amount using your finger to the vaginal area. Do this every night for two weeks and then twice weekly thereafter as a maintenance dose.   -Estrogen can help reduce your risk for urinary tract infections.     If you continue to get SYMPTOMATIC urinary tract infections despite the estrogen cream + probiotics, I would then recommend that we start you on daily low dose trimethoprim 100 mg for 3-6 months and UTI prevention.    Follow up with me in 3 months to recheck, sooner with any issues.      - Should you develop symptoms consistent with a urinary tract infection in the interim, please call our clinic directly. Nursing staff will then place an order for UA/UC and you may then make a lab only appointment to drop a urine specimen off at any [facility] for convenience.    If you have any issues, questions or concerns in the meantime, do not hesitate to contact us at [phone] or via [employer].     It was a pleasure meeting with you today.  Thank you for allowing me and my team the privilege of caring for you today.  YOU are the reason we are here, and I truly hope we provided you with the excellent service you deserve.  Please let us know if there is anything else we can do for you so that we can be sure you are leaving completely satisfied with your care experience.

## 2018-03-02 NOTE — NURSING NOTE
"Linda Spicer's goals for this visit include:   Chief Complaint   Patient presents with     Consult     Recurrent UTI's       She requests these members of her care team be copied on today's visit information: Yes    PCP: Tre Lockett    Referring Provider:  No referring provider defined for this encounter.    Chief Complaint   Patient presents with     Consult     Recurrent UTI's       Initial /60 (BP Location: Left arm, Patient Position: Sitting, Cuff Size: Adult Regular)  Pulse 61  SpO2 99% Estimated body mass index is 28.48 kg/(m^2) as calculated from the following:    Height as of 2/27/18: 1.626 m (5' 4\").    Weight as of 2/27/18: 75.3 kg (165 lb 14.4 oz).  Medication Reconciliation: complete    Do you need any medication refills at today's visit? No      Pt unable to void, bladder scan showed 258mL  "

## 2018-03-02 NOTE — MR AVS SNAPSHOT
After Visit Summary   3/2/2018    Linda Spicer    MRN: [id number]           Patient Information     Date Of Birth          6/13/1931        Visit Information        Provider Department      3/2/2018 11:00 AM Asia Steven PA-C [facility]        Care Instructions    UROLOGY CLINIC VISIT PATIENT INSTRUCTIONS    Avoid checking urine samples if you do not have any urinary symptoms (burning with urination, frequency, urgency, blood in the urine, etc.).    You SHOULD have a urine sample checked if you have any of the above symptoms or back pain, abdominal pain, fevers, chills, nausea, vomiting, or increased confusion.     Continue to take a daily probiotic as you are doing.     Start using vaginal estrogen cream. We [facility] who will call you to confirm your address.   -Once the cream arrives in the mail, apply a dime size amount using your finger to the vaginal area. Do this every night for two weeks and then twice weekly thereafter as a maintenance dose.   -Estrogen can help reduce your risk for urinary tract infections.     If you continue to get SYMPTOMATIC urinary tract infections despite the estrogen cream + probiotics, I would then recommend that we start you on daily low dose trimethoprim 100 mg for 3-6 months and UTI prevention.    Follow up with me in 3 months to recheck, sooner with any issues.      - Should you develop symptoms consistent with a urinary tract infection in the interim, please call our clinic directly. Nursing staff will then place an order for UA/UC and you may then make a lab only appointment to drop a urine specimen off at any [facility] for convenience.    If you have any issues, questions or concerns in the meantime, do not hesitate to contact us at [phone] or via [employer]     It was a pleasure meeting with you today.  Thank you for allowing me and my team the privilege of caring for you  today.  YOU are the reason we are here, and I truly hope we provided you with the excellent service you deserve.  Please let us know if there is anything else we can do for you so that we can be sure you are leaving completely satisfied with your care experience.                Follow-ups after your visit        Your next 10 appointments already scheduled     Mar 07, 2018 10:00 AM CST   Level 2 with BAY 9 INFUSION   [facility]    [address] [place] [address]   [phone]            Mar 07, 2018  1:40 PM CST   Nurse Only with BK ANCILLARY   [facility]    [address] [facility] [address]   [phone]            Apr 04, 2018  2:30 PM CDT   Level 2 with BAY 6 INFUSION   [facility]    [address] [place] [address]   [phone]            Apr 04, 2018  4:00 PM CDT   Return Visit with DEVICE CHECK RN CARD   [facility])    [id number] [place] [address]   [phone]            Apr 23, 2018 11:00 AM CDT   New Visit with Tomi Peña MD   [facility])    [address] [place] [address]   [phone]            May 08, 2018 10:20 AM CDT   Return Visit with Mario Davenport MD   [facility]    [address] [facility] [address]   [phone]            Jun 01, 2018 10:30 AM CDT   Return Visit with Asia Steven PA-C   [facility])    [address] [place] [address]   [phone]            Aug 20, 2018 12:30 PM CDT   Return Visit with Emeterio Loza MD   [facility])    [address] [place] [address]  [facility] [id number] [phone]            Jim 15, 2019  9:30 AM CST   Return Visit with Sandra Comer MD   [facility]    [address] [place] [id number] [phone]              Who to contact     If you have questions or need follow up information about today's clinic visit or your schedule please contact [facility] directly at [phone].  Normal or non-critical lab and imaging results will be communicated to you by [employer], letter or phone within 4 business days after the clinic has received the results. If you do not hear from us within 7 days, please contact the clinic through [employer] or phone. If you have a critical or abnormal lab result, we will notify you by phone as soon as possible.  Submit refill requests through [employer] or call your pharmacy and they will forward the refill request to us. Please allow 3 business days for your refill to be completed.          Additional Information About Your Visit        [employer] Information     [employer] gives you secure access to your electronic health record. If you see a primary care provider, you can also send messages to your care team and make appointments. If you have questions, please call your primary care clinic.  If you do not have a primary care provider, please call [phone] and they will assist you.      [employer] is an electronic gateway that provides easy, online access to your medical records. With [employer], you can request a clinic appointment, read your test results, renew a prescription or communicate with your care team.     To access your existing account, please contact your [facility] or call [phone] for assistance.        Care EveryWhere ID     This is your Care EveryWhere ID. This could be used by other organizations to access your [facility] medical records  [id number]        Your Vitals Were     Pulse Pulse  Oximetry                61 99%           Blood Pressure from Last 3 Encounters:   03/02/18 129/60   02/27/18 117/76   02/13/18 120/78    Weight from Last 3 Encounters:   02/27/18 75.3 kg (165 lb 14.4 oz)   02/13/18 73.8 kg (162 lb 9.6 oz)   02/07/18 73.9 kg (163 lb)              Today, you had the following     No orders found for display       Primary Care Provider Office Phone # Fax #    Tre Lockett [phone]       [address]  [place] [facility] [address]        Equal Access to Services     [facility]: Hadii aad mariely hadmyrao Sojordin, waaxda luqadaha, qaybta kaalmada adenicolasyada, zahraa moss . So [facility] [phone].    ATENCIÓN: Si habla español, tiene a merchant disposición servicios gratuitos de asistencia lingüística. [facility] [phone].    We comply with applicable federal civil rights laws and [place] laws. We do not discriminate on the basis of race, color, national origin, age, disability, sex, sexual orientation, or gender identity.            Thank you!     Thank you for choosing [facility]  for your care. Our goal is always to provide you with excellent care. Hearing back from our patients is one way we can continue to improve our services. Please take a few minutes to complete the written survey that you may receive in the mail after your visit with us. Thank you!             Your Updated Medication List - Protect others around you: Learn how to safely use, store and throw away your medicines at www.disposemymeds.org.          This list is accurate as of 3/2/18 11:51 AM.  Always use your most recent med list.                   Brand Name Dispense Instructions for use Diagnosis    apixaban ANTICOAGULANT 2.5 MG tablet    ELIQUIS    180 tablet    Take 1 tablet (2.5 mg) by mouth 2 times daily    Atrial flutter, paroxysmal (H)       azaTHIOprine 50 MG tablet    IMURAN    90 tablet    Take 1 tablet (50 mg) by mouth daily    Rheumatoid  arthritis involving multiple sites with positive rheumatoid factor (H)       bismuth subsalicylate 262 MG chewable tablet    PEPTO BISMOL    100 tablet    Take 2 tablets (524 mg) by mouth 4 times daily as needed    Dyspepsia and disorder of function of stomach       Blood Pressure Monitor Kit     1 kit    1 kit daily as needed    CHF (congestive heart failure) (H)       bumetanide 1 MG tablet    BUMEX    30 tablet    Take 1 tablet (1 mg) by mouth every morning    Chronic diastolic congestive heart failure (H)       calcium-vitamin D 600-400 MG-UNIT per tablet    CALTRATE     Take 1 tablet by mouth 2 times daily        Cranberry 180 MG Caps      Take 1 capsule by mouth daily Unsure of strength        fish oil-omega-3 fatty acids 1000 MG capsule      Take 2 g by mouth daily. 2 capsules daily        FLAGYL PO      Take 500 mg by mouth 2 times daily    Acute infectious diarrhea       folic acid 1 MG tablet    FOLVITE    90 tablet    Take 1 tablet (1 mg) by mouth daily    Oral aphthae       GENTEAL MILD OP      Apply  to eye daily.        hydrocortisone 2.5 % cream     30 g    Apply BID to affected region(s) for 7-10 days.    Rash       levothyroxine 75 MCG tablet    SYNTHROID/LEVOTHROID    90 tablet    TAKE ONE TABLET BY MOUTH EVERY DAY    Hypothyroidism, unspecified type       loratadine 10 MG tablet    CLARITIN     Take 10 mg by mouth every morning        LORazepam 1 MG tablet    ATIVAN    30 tablet    Take 1 tablet (1 mg) by mouth 2 times daily    Anxiety       LOSARTAN POTASSIUM PO      Take 25 mg by mouth every evening    Paroxysmal atrial fibrillation (H)       metoprolol tartrate 25 MG tablet    LOPRESSOR    60 tablet    Take 1 tablet (25 mg) by mouth 2 times daily    Atrial fibrillation, unspecified type (H), Hypertension goal BP (blood pressure) < 150/90       nitroGLYcerin 0.4 MG sublingual tablet    NITROSTAT    25 tablet    Place 1 tablet (0.4 mg) under the tongue every 5 minutes as needed for chest pain     Chest pain       * order for DME     1 Box    Equipment being ordered: Dispense face mask. Mrs. Spicer is immunosuppressed due to rheumatoid arthritis.    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 Use with Albuterol.    Hypoxia       order for DME     1 each    Equipment being ordered: Dispense baffle, for use with nebulizer.    Pneumonia of left upper lobe due to Mycoplasma pneumoniae       * order for DME     1 each    Dispense SP Walker-small    Pain of toe of right foot, Status post bunionectomy       PRESERVISION AREDS PO      Take 1 tablet by mouth daily        ranibizumab 0.3 MG/0.05ML Soln    LUCENTIS     0.3 mg by Intravitreal route        ranitidine 150 MG tablet    ZANTAC    60 tablet    Take 1 tablet (150 mg) by mouth 2 times daily    Gastroesophageal reflux disease without esophagitis       REFRESH P.M. Oint      Apply  to eye. Daily at bedtime        saccharomyces boulardii 250 MG capsule    FLORASTOR     Take 250 mg by mouth daily        senna-docusate 8.6-50 MG per tablet    SENOKOT-S;PERICOLACE    120 tablet    Take 2 tablets by mouth At Bedtime Hold if diarrhea occurs.    Constipation, chronic       triamcinolone 0.1 % cream    KENALOG    453.6 g    Apply sparingly to affected area on face three times daily.    Facial lesion       VITAMIN C PO           ZYRTEC ALLERGY 10 MG tablet   Generic drug:  cetirizine      Take 10 mg by mouth At Bedtime        * Notice:  This list has 3 medication(s) that are the same as other medications prescribed for you. Read the directions carefully, and ask your doctor or other care provider to review them with you.

## 2018-03-02 NOTE — NURSING NOTE
Vaginal estrogen cream ordered per Asia Steven PA-C. Prescription successfully faxed to [facility] (Fax #[phone])    Sena Chapman RN, BSN

## 2018-03-02 NOTE — PROGRESS NOTES
CC: Recurrent urinary tract infections.    HPI: It is a pleasure to see Ms. Linda Spicer, a very pleasant 86 year old female seen today in the urology clinic in consultation from Dr. Lockett for evaluation of recurrent urinary tract infections. These have been ongoing for a few years but worsening significantly since November 2017. The patient reports having 3 UTI's since November. When asked what her symptoms of infection are, she states that she usually does not have any. She just gets urine samples checked when she is in clinic. She did have one episode of dysuria before her January infection, but this was transient. She otherwise denies dysuria, frequency, urgency, hematuria, abdominal/back pain, fevers, chills, or confusion. She denies a history of kidney stones and has never been hospitalized for a UTI.     She reports being on daily low dose Cipro for several months in the Spring of 2017 which was prescribed by her PCP. This worked well to reduce infection but caused a rash so she eventually stopped it.   She recently completed a course of Doxycycline for an infection (coag neg Staph) which she states worked well.  She denies any urinary symptoms today.   Not currently sexually active.   She is post-menopausal. Not taking any form of HRT.    REVIEW OF DIAGNOSTICS:   2/13/18 - UA: negative --> UC: 10-50K coag neg Staph, >100K mixed  osvaldo  1/22/18 - UA: negative  1/15/18 - UA: negative  1/4/18 - UA: large blood, large LE, >100 WBC, 10-25 RBC, few bacteria --> UC: 10-50K mixed  osvaldo  12/4/17 -  UA: negative  11/27/17 - UA: trace LE, 0-2 WBC, 0-2 RBC, few bacteria  11/25/17 - UA: negative  11/21/17 - UA: small blood, large LE, >100 WBC, 2-5 RBC, many bacteria --> UC: >100K Citrobacter amalonaticus  8/30/17 - UA: negative  7/21/17 - UA: negative  6/15/17 - UA: negative  2/3/17 - UA: negative    CT A/P w/o contrast  8/2016  No discrete renal or ureteric calculi are identified. There is  no  hydroureteronephrosis. The kidneys have an unremarkable noncontrast  appearance. Urinary bladder is partially distended.    Past Medical History:   Diagnosis Date     Adjustment disorder with anxious mood 5/18/2015     Advanced directives, counseling/discussion 8/30/2012    Patient states has Advance Directive and will bring in a copy to clinic. 8/30/2012   [facility] Medical Assistant \       Anemia 9/25/2015     Basal cell cancer      CHF (congestive heart failure) (H) 9/18/2014     CKD (chronic kidney disease) stage 3, GFR 30-59 ml/min 9/29/2015     DDD (degenerative disc disease), lumbar 9/25/2015     Diffuse idiopathic pulmonary fibrosis (H) 5/6/2013     Encounter for palliative care 5/18/2015     History of blood transfusion 9/29/2015     Hypertension goal BP (blood pressure) < 140/90 9/7/2012     Hypothyroid 9/7/2012     Irritable bowel syndrome 10/29/2013     Macular degeneration      Macular degeneration, 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     Nondisplaced spiral fracture of shaft of humerus      Osteoporosis 8/13/2013     [employer] Update utility     RA (rheumatoid arthritis) (H) 5/7/2013     Rheumatic fever      Shingles      Spinal stenosis of lumbar region with neurogenic claudication 9/14/2015     Past Surgical History:   Procedure Laterality Date     APPENDECTOMY       BIOPSY      hemorrhoidectomy     ENT SURGERY      tonsillectomy     GYN SURGERY      3 D & C's     HYSTERECTOMY, PAP NO LONGER INDICATED       LAMINECTOMY LUMBAR ONE LEVEL N/A 10/13/2015    Procedure: LAMINECTOMY LUMBAR ONE LEVEL;  Surgeon: Fransico Toussaint MD;  Location: [facility] OR     Current Outpatient Prescriptions   Medication Sig Dispense Refill     COMPOUNDED NON-CONTROLLED SUBSTANCE (CMPD RX) - PHARMACY TO MIX COMPOUNDED MEDICATION Estriol 1mg/gram. Place 1 gram vaginally daily for two weeks, then vaginally twice weekly after. 30 g 6     saccharomyces boulardii (FLORASTOR) 250 MG capsule Take 250 mg by mouth daily       Ascorbic Acid  (VITAMIN C PO)        LORazepam (ATIVAN) 1 MG tablet Take 1 tablet (1 mg) by mouth 2 times daily 30 tablet 0     apixaban ANTICOAGULANT (ELIQUIS) 2.5 MG tablet Take 1 tablet (2.5 mg) by mouth 2 times daily 180 tablet 3     LOSARTAN POTASSIUM PO Take 25 mg by mouth every evening       loratadine (CLARITIN) 10 MG tablet Take 10 mg by mouth every morning       bumetanide (BUMEX) 1 MG tablet Take 1 tablet (1 mg) by mouth every morning 30 tablet 11     bismuth subsalicylate (PEPTO BISMOL) 262 MG chewable tablet Take 2 tablets (524 mg) by mouth 4 times daily as needed 100 tablet 11     metoprolol (LOPRESSOR) 25 MG tablet Take 1 tablet (25 mg) by mouth 2 times daily 60 tablet 5     folic acid (FOLVITE) 1 MG tablet Take 1 tablet (1 mg) by mouth daily 90 tablet 3     levothyroxine (SYNTHROID/LEVOTHROID) 75 MCG tablet TAKE ONE TABLET BY MOUTH EVERY DAY 90 tablet 2     ranitidine (ZANTAC) 150 MG tablet Take 1 tablet (150 mg) by mouth 2 times daily 60 tablet 5     azaTHIOprine (IMURAN) 50 MG tablet Take 1 tablet (50 mg) by mouth daily 90 tablet 2     order for DME Dispense SP Walker-small 1 each 0     ranibizumab (LUCENTIS) 0.3 MG/0.05ML SOLN 0.3 mg by Intravitreal route       senna-docusate (SENOKOT-S;PERICOLACE) 8.6-50 MG per tablet Take 2 tablets by mouth At Bedtime Hold if diarrhea occurs. 120 tablet 11     order for DME Equipment being ordered: Dispense baffle, for use with nebulizer. 1 each 0     order for DME Equipment being ordered: Nebulizer. Use with Albuterol. 1 each 0     order for DME Equipment being ordered: Dispense face mask.  Mrs. Spicer is immunosuppressed due to rheumatoid arthritis. 1 Box 11     triamcinolone (KENALOG) 0.1 % cream Apply sparingly to affected area on face three times daily. 453.6 g 5     Multiple Vitamins-Minerals (PRESERVISION AREDS PO) Take 1 tablet by mouth daily        Blood Pressure Monitor KIT 1 kit daily as needed 1 kit 0     nitroglycerin (NITROSTAT) 0.4 MG SL tablet Place 1 tablet  (0.4 mg) under the tongue every 5 minutes as needed for chest pain 25 tablet 0     cetirizine (ZYRTEC ALLERGY) 10 MG tablet Take 10 mg by mouth At Bedtime       Hypromellose (GENTEAL MILD OP) Apply  to eye daily.       Artificial Tear Ointment (REFRESH P.M.) OINT Apply  to eye. Daily at bedtime          calcium-vitamin D (CALTRATE) 600-400 MG-UNIT per tablet Take 1 tablet by mouth 2 times daily        fish oil-omega-3 fatty acids (FISH OIL) 1000 MG capsule Take 2 g by mouth daily. 2 capsules daily            hydrocortisone 2.5 % cream Apply BID to affected region(s) for 7-10 days. (Patient not taking: Reported on 2/27/2018) 30 g 0     MetroNIDAZOLE (FLAGYL PO) Take 500 mg by mouth 2 times daily        Cranberry 180 MG CAPS Take 1 capsule by mouth daily Unsure of strength       Allergies   Allergen Reactions     Cephalexin Hcl Diarrhea     Gabapentin Other (See Comments)     Dizzsiness     Naproxen GI Disturbance     Perfume      Lactase Other (See Comments)     Macrobid [Nitrofurantoin Anhydrous]      Possibly related to lung disease      Sulfa Drugs      Throat swelling     Xanax [Alprazolam] Other (See Comments)     Dizziness        Family History: There is no h/o  carcinoma.  There is no h/o urolithiasis.    Social History: The patient does not smoke cigarettes, rare EtOH and no illicit drug use.    ROS: A comprehensive 14 point ROS was obtained and was positive for CKD stage 3, ILD, CAD s/p pacemaker, DDD, HTN, IBS and otherwise negative except for that outlined above in the HPI.    PHYSICAL EXAM:   Blood pressure 129/60, pulse 61, SpO2 99 %, not currently breastfeeding.   There is no height or weight on file to calculate BMI.  GENERAL: Well groomed/well developed/well nourished female in NAD.  HEENT: EOMI, AT, NC.  SKIN: Warm to touch, dry.  No visible rashes or lesions.  RESP: No increased respiratory effort.  LYMPH: No LE edema.  ABD: Soft, NT, ND.  No CVAT.    MS: Full ROM in extremities.  PELVIC: Deferred  for now.   NEURO: Alert and oriented x 3.  PSYCH: Normal mood and affect, pleasant and agreeable during interview and exam.    IMAGING: see above    Random bladder scan: 258 mL (last void a few hours ago)  After voiding, her repeat bladder scan was 202 mL but she reports feeling rushed so does not think she took enough time to empty all the way.     ASSESSMENT/PLAN:  Ms. Linda Spicer is an 86 year old female a history of recurrent UTIs and some incomplete bladder emptying. She reports that she typically does not have symptoms when she is found to have a UTI. Discussed with her that we typically advise against checking UA/UC's unless she is having symptoms. Otherwise, this may just be asymptomatic bacteriuria which does not necessarily require treatment. Review of cultures over the past year only show 1 true UTI in Nov 2017 with >100K Citrobacter amalonaticus. She had a CT in 8/2016 which showed normal kidneys and bladder, making stone as a nidus for infections unlikely. Possible that her post-menopausal state is contributing to frequent infections as well. We therefore discussed and will plan for the following:   -Do not leave urine samples if asymptomatic. Only check UA/UC if having dysuria, frequency, urgency, hematuria, flank/abdominal pain, fevers, chills, or confusion.   -Will start vaginal estrogen cream to restore normal vaginal pH which can promote lactobacillus growth and reduce risk for UTI's. Rx sent to [facility]. Apply qhs x 2 weeks, then twice weekly thereafter. Side effects and application directions discussed.   -Double void to promote complete bladder emptying.  -Continue to take a daily probiotic.     Follow up with me in 3 months for PVR and to recheck.  -If continues to get truly symptomatic UTI's with positive cultures, would then consider daily suppressive therapy with trimethoprim 100 mg.    - Should the patient develop symptoms consistent with a urinary tract infection in  the interim, I have asked her to call our clinic directly. Nursing staff will then place an order for UA/UC and she may then make a lab only appointment to drop a urine specimen off at any [facility] for convenience. We reviewed the importance of attempting to always leave a urine specimen when symptomatic to trend frequency of infections, causative organisms and drug sensitivities.      I have enjoyed participating in the medical care of this very pleasant patient.  Please don't hesitate to contact me with any questions or concerns.     Asia Steven PA-C  Department of Urology

## 2018-03-05 ENCOUNTER — CARE COORDINATION (OUTPATIENT)
Dept: CARE COORDINATION | Facility: CLINIC | Age: 83
End: 2018-03-05

## 2018-03-05 NOTE — PROGRESS NOTES
Clinic Care Coordination Contact  Patient called into RN CC to state she is feeling well, but she needs a refill of folic acid, however, patient does not have a refill on file at [facility].  RN CC noted a prescription on file from 11/27/17 with [facility]:  folic acid (FOLVITE) 1 MG tablet 90 tablet 3 11/27/2017  No   Sig: Take 1 tablet (1 mg) by mouth daily   Class: E-Prescribe   Route: Oral   Order: [id number]   E-Prescribing Status: Receipt confirmed by pharmacy (11/27/2017 12:31 PM CST)           RN CC called and spoke with [facility]ist to have this prescription transferred.    Melissa Behl BSN, RN, PHN  [facility] Care Coordinator  [phone]

## 2018-03-07 ENCOUNTER — INFUSION THERAPY VISIT (OUTPATIENT)
Dept: INFUSION THERAPY | Facility: CLINIC | Age: 83
End: 2018-03-07
Payer: MEDICARE

## 2018-03-07 VITALS
SYSTOLIC BLOOD PRESSURE: 124 MMHG | OXYGEN SATURATION: 100 % | HEART RATE: 63 BPM | TEMPERATURE: 98.1 F | BODY MASS INDEX: 28.12 KG/M2 | WEIGHT: 163.8 LBS | RESPIRATION RATE: 18 BRPM | DIASTOLIC BLOOD PRESSURE: 68 MMHG

## 2018-03-07 DIAGNOSIS — M05.79 RHEUMATOID ARTHRITIS INVOLVING MULTIPLE SITES WITH POSITIVE RHEUMATOID FACTOR (H): Primary | ICD-10-CM

## 2018-03-07 PROCEDURE — 99207 ZZC NO CHARGE LOS: CPT

## 2018-03-07 PROCEDURE — 96365 THER/PROPH/DIAG IV INF INIT: CPT | Performed by: INTERNAL MEDICINE

## 2018-03-07 RX ADMIN — Medication 250 ML: at 10:30

## 2018-03-07 NOTE — PROGRESS NOTES
Infusion Nursing Note:  Linda Spicer presents today for Orencia.    Patient seen by provider today: No   present during visit today: Not Applicable.    Note: N/A.    Intravenous Access:  Peripheral IV placed.    Treatment Conditions:  Not Applicable.      Post Infusion Assessment:  Patient tolerated infusion without incident.  Site patent and intact, free from redness, edema or discomfort.  Access discontinued per protocol.    Discharge Plan:   Patient will return 4/4/18 for next appointment.   Patient discharged in stable condition accompanied by: daughter.  Departure Mode: Ambulatory.    Isidra Rojas RN

## 2018-03-07 NOTE — MR AVS SNAPSHOT
After Visit Summary   3/7/2018    Linda Spicer    MRN: [id number]           Patient Information     Date Of Birth          6/13/1931        Visit Information        Provider Department      3/7/2018 10:00 AM [place] 9 INFUSION [facility]        Today's Diagnoses     Rheumatoid arthritis involving multiple sites with positive rheumatoid factor (H)    -  1       Follow-ups after your visit        Your next 10 appointments already scheduled     Apr 04, 2018  2:30 PM CDT   Level 2 with [place] 6 INFUSION   [facility] ([facility]    [address] [place] [id number]   [phone]            Apr 04, 2018  4:00 PM CDT   Return Visit with DEVICE CHECK RN CARD   [facility] ([facility])    [id number] [place] [address]   [phone]            Apr 23, 2018 11:00 AM CDT   New Visit with Tomi Peña MD   [facility] ([facility]    [address] [place] [address]   [phone]            May 02, 2018 10:00 AM CDT   Level 2 with [place] 9 INFUSION   [facility] ([facility])    [address] [place] [address]   [phone]            May 08, 2018 10:20 AM CDT   Return Visit with Mario Davenport MD   [facility] ([facility])    [address] [place] [address]   [phone]            Jun 01, 2018 10:30 AM CDT   Return Visit with Asia Steven PA-C   [facility]    [address] [facility] [address]   [phone]            Aug 20, 2018 12:30 PM CDT   Return Visit with Emeterio Loza MD   [facility] ([facility])    [address] [facility] [address]   [phone]            Jim 15, 2019  9:30 AM CST   Return Visit with  Sandra Comer MD   [facility] ([facility])    [address] [place] [address] [phone]              Who to contact     If you have questions or need follow up information about today's clinic visit or your schedule please contact [facility] directly at [phone].  Normal or non-critical lab and imaging results will be communicated to you by MyChart, letter or phone within 4 business days after the clinic has received the results. If you do not hear from us within 7 days, please contact the clinic through InstantQuesthart or phone. If you have a critical or abnormal lab result, we will notify you by phone as soon as possible.  Submit refill requests through GaBoom or call your pharmacy and they will forward the refill request to us. Please allow 3 business days for your refill to be completed.          Additional Information About Your Visit        InstantQuestharPivot3 Information     GaBoom gives you secure access to your electronic health record. If you see a primary care provider, you can also send messages to your care team and make appointments. If you have questions, please call your primary care clinic.  If you do not have a primary care provider, please call [phone] and they will assist you.      GaBoom is an electronic gateway that provides easy, online access to your medical records. With GaBoom, you can request a clinic appointment, read your test results, renew a prescription or communicate with your care team.     To access your existing account, please contact your [facility] or call [phone] for assistance.        Care EveryWhere ID     This is your Care EveryWhere ID. This could be used by other organizations to access your [facility] medical records  [id number]        Your Vitals Were     Pulse Temperature Respirations Pulse Oximetry BMI (Body Mass Index)       63 98.1  F (36.7  C) (Oral) 18  100% 28.12 kg/m2        Blood Pressure from Last 3 Encounters:   03/07/18 124/68   03/02/18 129/60   02/27/18 117/76    Weight from Last 3 Encounters:   03/07/18 74.3 kg (163 lb 12.8 oz)   02/27/18 75.3 kg (165 lb 14.4 oz)   02/13/18 73.8 kg (162 lb 9.6 oz)              Today, you had the following     No orders found for display       Primary Care Provider Office Phone # Fax #    Tre Lockett [phone]       [address]  [place] [address]        Equal Access to Services     [facility]: Hadii rakesh Goyal, wabrigida quinn, qaybta kaalmada ana, zahraa moss . So [facility] [phone].    ATENCIÓN: Si habla español, tiene a merchant disposición servicios gratuitos de asistencia lingüística. Llame al [phone].    We comply with applicable federal civil rights laws and [place] laws. We do not discriminate on the basis of race, color, national origin, age, disability, sex, sexual orientation, or gender identity.            Thank you!     Thank you for choosing [facility]  for your care. Our goal is always to provide you with excellent care. Hearing back from our patients is one way we can continue to improve our services. Please take a few minutes to complete the written survey that you may receive in the mail after your visit with us. Thank you!             Your Updated Medication List - Protect others around you: Learn how to safely use, store and throw away your medicines at www.disposemymeds.org.          This list is accurate as of 3/7/18  3:53 PM.  Always use your most recent med list.                   Brand Name Dispense Instructions for use Diagnosis    apixaban ANTICOAGULANT 2.5 MG tablet    ELIQUIS    180 tablet    Take 1 tablet (2.5 mg) by mouth 2 times daily    Atrial flutter, paroxysmal (H)       azaTHIOprine 50 MG tablet    IMURAN    90 tablet    Take 1 tablet (50 mg) by mouth daily    Rheumatoid arthritis  involving multiple sites with positive rheumatoid factor (H)       bismuth subsalicylate 262 MG chewable tablet    PEPTO BISMOL    100 tablet    Take 2 tablets (524 mg) by mouth 4 times daily as needed    Dyspepsia and disorder of function of stomach       Blood Pressure Monitor Kit     1 kit    1 kit daily as needed    CHF (congestive heart failure) (H)       bumetanide 1 MG tablet    BUMEX    30 tablet    Take 1 tablet (1 mg) by mouth every morning    Chronic diastolic congestive heart failure (H)       calcium-vitamin D 600-400 MG-UNIT per tablet    CALTRATE     Take 1 tablet by mouth 2 times daily        COMPOUNDED NON-CONTROLLED SUBSTANCE - PHARMACY TO MIX COMPOUNDED MEDICATION    CMPD RX    30 g    Estriol 1mg/gram. Place 1 gram vaginally daily for two weeks, then vaginally twice weekly after.    Atrophic vaginitis       Cranberry 180 MG Caps      Take 1 capsule by mouth daily Unsure of strength        fish oil-omega-3 fatty acids 1000 MG capsule      Take 2 g by mouth daily. 2 capsules daily        FLAGYL PO      Take 500 mg by mouth 2 times daily    Acute infectious diarrhea       folic acid 1 MG tablet    FOLVITE    90 tablet    Take 1 tablet (1 mg) by mouth daily    Oral aphthae       GENTEAL MILD OP      Apply  to eye daily.        hydrocortisone 2.5 % cream     30 g    Apply BID to affected region(s) for 7-10 days.    Rash       levothyroxine 75 MCG tablet    SYNTHROID/LEVOTHROID    90 tablet    TAKE ONE TABLET BY MOUTH EVERY DAY    Hypothyroidism, unspecified type       loratadine 10 MG tablet    CLARITIN     Take 10 mg by mouth every morning        LORazepam 1 MG tablet    ATIVAN    30 tablet    Take 1 tablet (1 mg) by mouth 2 times daily    Anxiety       LOSARTAN POTASSIUM PO      Take 25 mg by mouth every evening    Paroxysmal atrial fibrillation (H)       metoprolol tartrate 25 MG tablet    LOPRESSOR    60 tablet    Take 1 tablet (25 mg) by mouth 2 times daily    Atrial fibrillation, unspecified  type (H), Hypertension goal BP (blood pressure) < 150/90       nitroGLYcerin 0.4 MG sublingual tablet    NITROSTAT    25 tablet    Place 1 tablet (0.4 mg) under the tongue every 5 minutes as needed for chest pain    Chest pain       * order for DME     1 Box    Equipment being ordered: Dispense face mask. Mrs. Spicer is immunosuppressed due to rheumatoid arthritis.    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 Use with Albuterol.    Hypoxia       order for DME     1 each    Equipment being ordered: Dispense baffle, for use with nebulizer.    Pneumonia of left upper lobe due to Mycoplasma pneumoniae       * order for DME     1 each    Dispense SP Walker-small    Pain of toe of right foot, Status post bunionectomy       PRESERVISION AREDS PO      Take 1 tablet by mouth daily        ranibizumab 0.3 MG/0.05ML Soln    LUCENTIS     0.3 mg by Intravitreal route        ranitidine 150 MG tablet    ZANTAC    60 tablet    Take 1 tablet (150 mg) by mouth 2 times daily    Gastroesophageal reflux disease without esophagitis       REFRESH P.M. Oint      Apply  to eye. Daily at bedtime        saccharomyces boulardii 250 MG capsule    FLORASTOR     Take 250 mg by mouth daily        senna-docusate 8.6-50 MG per tablet    SENOKOT-S;PERICOLACE    120 tablet    Take 2 tablets by mouth At Bedtime Hold if diarrhea occurs.    Constipation, chronic       triamcinolone 0.1 % cream    KENALOG    453.6 g    Apply sparingly to affected area on face three times daily.    Facial lesion       VITAMIN C PO           ZYRTEC ALLERGY 10 MG tablet   Generic drug:  cetirizine      Take 10 mg by mouth At Bedtime        * Notice:  This list has 3 medication(s) that are the same as other medications prescribed for you. Read the directions carefully, and ask your doctor or other care provider to review them with you.

## 2018-03-09 ENCOUNTER — TELEPHONE (OUTPATIENT)
Dept: CARDIOLOGY | Facility: CLINIC | Age: 83
End: 2018-03-09

## 2018-03-09 NOTE — TELEPHONE ENCOUNTER
Called and spoke to Lacey at [employer]. Advised her that on 2/2 I had spoke to the company and was informed that the patient had to pay 3% or $552.50 out of pocket first, then they would consider her for the patient assistance program. She would need to fax or mail a pharmacy statement with the amount paid to [employer].  The patient has done so and I have a copy of her receipts. Lacey stated to fax the papers in with the case number stating we are asking for an appeal.   Receipts were faxed and confirmed to [employer].      Message left for patient on her cell phone number. Home number busy.

## 2018-03-19 ENCOUNTER — CARE COORDINATION (OUTPATIENT)
Dept: CARE COORDINATION | Facility: CLINIC | Age: 83
End: 2018-03-19

## 2018-03-19 ENCOUNTER — TELEPHONE (OUTPATIENT)
Dept: CARDIOLOGY | Facility: CLINIC | Age: 83
End: 2018-03-19

## 2018-03-19 DIAGNOSIS — I50.30 HEART FAILURE WITH PRESERVED EJECTION FRACTION, NYHA CLASS I (H): Primary | ICD-10-CM

## 2018-03-19 RX ORDER — LOSARTAN POTASSIUM 50 MG/1
50 TABLET ORAL DAILY
Qty: 90 TABLET | Refills: 1 | Status: SHIPPED | OUTPATIENT
Start: 2018-03-19 | End: 2018-08-06

## 2018-03-19 NOTE — PROGRESS NOTES
Clinic Care Coordination Contact  Patient calls into RN CC to state her prescription for losartan 25 mg, 2 tablets daily is running out.  Patient is requesting a new prescription for losartan 50 mg, 1 tablet daily to decrease how many pills she needs to take.  RN CC noted last cardiology appointment 2/27/18 notes from Dr. Loza:    1. Heart Failure, preserved LVEF.  The patient has significant RUSSELL, occurring with minimal activities. A component of this may be related to HFpEF.  Previously, I performed RHC and the left sided filling pressures were normal.  She tried a low dose diuretic without impact.  Continue Losartan 50 mg qHS and Toprol XL 25 mg qAM.     Please advise if losartan 50 mg, 1 tablet may be prescribed as requested by patient.  Patient states she only has 3 days left of medication.    Melissa Behl BSN, RN, [facility] Care Coordinator  [phone]

## 2018-03-19 NOTE — PROGRESS NOTES
Patient contacted and informed Rx for Losartan sent with new dosage of 50mg daily instead of 25mg twice daily to [facility] pharmacy.    Usama Ching CMA

## 2018-03-20 NOTE — TELEPHONE ENCOUNTER
Called and spoke to Linda. [employer] has activated her on the patient assistance program. Filled out physician form and faxed into company. She should call us if she does not receive the prescription .

## 2018-03-20 NOTE — PROGRESS NOTES
Noted.    Melissa Behl BSN, RN, PHN  [facility] Care Coordinator  [phone]

## 2018-03-29 ENCOUNTER — TELEPHONE (OUTPATIENT)
Dept: FAMILY MEDICINE | Facility: CLINIC | Age: 83
End: 2018-03-29

## 2018-03-29 ENCOUNTER — PATIENT OUTREACH (OUTPATIENT)
Dept: CARE COORDINATION | Facility: CLINIC | Age: 83
End: 2018-03-29

## 2018-03-29 DIAGNOSIS — E03.8 OTHER SPECIFIED HYPOTHYROIDISM: Primary | ICD-10-CM

## 2018-03-29 NOTE — PROGRESS NOTES
Clinic Care Coordination Contact  Patient states she has been drinking tonic water every night for leg cramps, which helps make her leg cramps tolerable.  Patient states she has read/heard that the tonic water can cause decrease in thyroid function and dangerous for your health.  Patient states without the tonic water her leg cramps are in her feet up to her knees and are not tolerable.  Patient inquires if Dr. Lockett would advise for patient to continue drinking tonic water for her leg cramps of if he has other advice.    RN CC sent patient's concern to PCP.  See 3/29/18 telephone encounter.    Melissa Behl BSN, RN, N  [facility] Care Coordinator  [phone]

## 2018-03-29 NOTE — TELEPHONE ENCOUNTER
Patient states she has been drinking tonic water every night for leg cramps, which helps make her leg cramps tolerable.  Patient states she has read/heard that the tonic water can cause decrease in thyroid function and dangerous for your health.  Patient states without the tonic water her leg cramps are in her feet up to her knees and are not tolerable.  Patient inquires if Dr. Lockett would advise for patient to continue drinking tonic water for her leg cramps of if he has other advice.    Please advise.    Melissa Behl BSN, RN, PHN  [facility] Care Coordinator  [phone]

## 2018-04-02 NOTE — TELEPHONE ENCOUNTER
Noted. Patient is due for her thyroid function tests anyway.  Orders done. She can come in anytime.

## 2018-04-03 NOTE — TELEPHONE ENCOUNTER
RN CC informed patient of Dr. Lockett's response.  Patient verbalized understanding.    Melissa Behl BSN, RN, N  [facility] Care Coordinator  [phone]

## 2018-04-04 ENCOUNTER — OFFICE VISIT (OUTPATIENT)
Dept: CARDIOLOGY | Facility: CLINIC | Age: 83
End: 2018-04-04
Payer: MEDICARE

## 2018-04-04 ENCOUNTER — INFUSION THERAPY VISIT (OUTPATIENT)
Dept: INFUSION THERAPY | Facility: CLINIC | Age: 83
End: 2018-04-04
Payer: MEDICARE

## 2018-04-04 ENCOUNTER — DOCUMENTATION ONLY (OUTPATIENT)
Dept: SPIRITUAL SERVICES | Facility: CLINIC | Age: 83
End: 2018-04-04

## 2018-04-04 VITALS
WEIGHT: 169 LBS | DIASTOLIC BLOOD PRESSURE: 56 MMHG | HEART RATE: 61 BPM | OXYGEN SATURATION: 100 % | BODY MASS INDEX: 29.01 KG/M2 | SYSTOLIC BLOOD PRESSURE: 130 MMHG | TEMPERATURE: 98.3 F | RESPIRATION RATE: 16 BRPM

## 2018-04-04 DIAGNOSIS — E03.8 OTHER SPECIFIED HYPOTHYROIDISM: ICD-10-CM

## 2018-04-04 DIAGNOSIS — M05.79 RHEUMATOID ARTHRITIS INVOLVING MULTIPLE SITES WITH POSITIVE RHEUMATOID FACTOR (H): Primary | ICD-10-CM

## 2018-04-04 DIAGNOSIS — Z71.81 SPIRITUAL OR RELIGIOUS COUNSELING: Primary | ICD-10-CM

## 2018-04-04 DIAGNOSIS — I49.5 SICK SINUS SYNDROME (H): Primary | ICD-10-CM

## 2018-04-04 LAB
T4 FREE SERPL-MCNC: 1.25 NG/DL (ref 0.76–1.46)
TSH SERPL DL<=0.005 MIU/L-ACNC: 0.53 MU/L (ref 0.4–4)

## 2018-04-04 PROCEDURE — 96365 THER/PROPH/DIAG IV INF INIT: CPT | Performed by: NURSE PRACTITIONER

## 2018-04-04 PROCEDURE — 93280 PM DEVICE PROGR EVAL DUAL: CPT | Mod: 26 | Performed by: INTERNAL MEDICINE

## 2018-04-04 PROCEDURE — 99207 ZZC NO CHARGE LOS: CPT | Performed by: INTERNAL MEDICINE

## 2018-04-04 PROCEDURE — 99207 ZZC NO CHARGE LOS: CPT

## 2018-04-04 PROCEDURE — 84443 ASSAY THYROID STIM HORMONE: CPT | Performed by: NURSE PRACTITIONER

## 2018-04-04 PROCEDURE — 84439 ASSAY OF FREE THYROXINE: CPT | Performed by: NURSE PRACTITIONER

## 2018-04-04 RX ADMIN — Medication 250 ML: at 15:10

## 2018-04-04 NOTE — PROGRESS NOTES
Preliminary Device Interrogation Results.  Final physician signed paceart report to be scanned and attached.      Pt seen in clinic for evaluation and iterative programming of her Medtronic dual chamber pacemaker per MD order.  She looks well and she states that she feels well but sometimes she feels her AFib.  Her pacemaker check shows an AF burden of 6.6% of the time, and she does take eliquis for anticoagulation. No ventricular arrhythmias have been recorded.  She is RV Pacing 10.1% of the time, her heart rate histograms show minimal heart rate variation and her pacemaker battery estimates 8.5 years left.  She sees Dr Loza in August 2018, we will plan for her to send a remote in 3 mos and RTC in 6 mos.      Dual pacemaker

## 2018-04-04 NOTE — MR AVS SNAPSHOT
After Visit Summary   4/4/2018    Linda Spicer    MRN: [id number]           Patient Information     Date Of Birth          6/13/1931        Visit Information        Provider Department      4/4/2018 2:30 PM Danese 6 INFUSION [facility]        Today's Diagnoses     Rheumatoid arthritis involving multiple sites with positive rheumatoid factor (H)    -  1    Other specified hypothyroidism           Follow-ups after your visit        Your next 10 appointments already scheduled     Apr 04, 2018  4:00 PM CDT   Return Visit with DEVICE CHECK RN CARD   [facility] ([facility])    [address] [address]   [phone]            Apr 23, 2018 11:00 AM CDT   New Visit with Tomi Peña MD   [facility] ([facility])    [address] [facility] [address]   [phone]            May 02, 2018 10:00 AM CDT   Level 2 with [address] ([facility])    [address] [place] [address]   [phone]            May 08, 2018 10:20 AM CDT   Return Visit with Mario Davenport MD   [facility] ([facility])    [address] [place] [address]   [phone]            May 30, 2018 10:00 AM CDT   Level 2 with [address] ([facility])    [address] [facility] [address]   [phone]            Jun 01, 2018 10:30 AM CDT   Return Visit with Asia Steven PA-C   [facility] ([facility])    [address] [facility] [address]   [phone]            Aug 20, 2018 12:30 PM CDT   Return Visit with Emeterio Loza MD   [facility])    [address] [facility] [address]   [phone]            Sep 05,  2018 10:00 AM CDT   Return Visit with DEVICE CHECK RN CARD   [facility] ([facility])    [address] [place] [address] [phone]            Jim 15, 2019  9:30 AM CST   Return Visit with Sandra Comer MD   [facility] ([facility])    [address] [facility] [address] [phone]              Who to contact     If you have questions or need follow up information about today's clinic visit or your schedule please contact [facility] directly at [phone].  Normal or non-critical lab and imaging results will be communicated to you by LiveBidhart, letter or phone within 4 business days after the clinic has received the results. If you do not hear from us within 7 days, please contact the clinic through LiveBidhart or phone. If you have a critical or abnormal lab result, we will notify you by phone as soon as possible.  Submit refill requests through [email or web address] or call your pharmacy and they will forward the refill request to us. Please allow 3 business days for your refill to be completed.          Additional Information About Your Visit        LiveBidharGuerrilla RF Information     [email or web address] gives you secure access to your electronic health record. If you see a primary care provider, you can also send messages to your care team and make appointments. If you have questions, please call your primary care clinic.  If you do not have a primary care provider, please call [phone] and they will assist you.      [email or web address] is an electronic gateway that provides easy, online access to your medical records. With [email or web address], you can request a clinic appointment, read your test results, renew a prescription or communicate with your care team.     To access your existing account, please contact your [facility] or call [phone] for assistance.        Care EveryWhere ID     This is your Care  EveryWhere ID. This could be used by other organizations to access your [place] medical records  [id number]        Your Vitals Were     Pulse Temperature Respirations Pulse Oximetry BMI (Body Mass Index)       61 98.3  F (36.8  C) (Oral) 16 100% 29.01 kg/m2        Blood Pressure from Last 3 Encounters:   04/04/18 130/56   03/07/18 124/68   03/02/18 129/60    Weight from Last 3 Encounters:   04/04/18 76.7 kg (169 lb)   03/07/18 74.3 kg (163 lb 12.8 oz)   02/27/18 75.3 kg (165 lb 14.4 oz)              We Performed the Following     T4, free     TSH        Primary Care Provider Office Phone # Fax #    Tre Lockett [phone]       [address] [place]  [facility] [address]        Goals        General    I will complete my health care directive. (pt-stated)     Notes - Note created  3/29/2018  3:07 PM by Behl, Melissa K, RN    Supporting Steps:    Pt has attended  a health care  directive clas-  s, is awaiting  to see her law-  elizabeth to finalize  the document.  10/24/17 Pt  informed of CPR  vs intubation  and information  mailed out to  patient.           I will schedule an appointment with endocrinology. (pt-stated)       Equal Access to Services     [facility]: Hadii rakesh schuster hadasho Soomaali, waaxda luqadaha, qaybta kaalmada adeegyada, zahraa skinner. So [facility] [phone].    ATENCIÓN: Si habla español, tiene a merchant disposición servicios gratuitos de asistencia lingüística. Llame al [phone].    We comply with applicable federal civil rights laws and [place] laws. We do not discriminate on the basis of race, color, national origin, age, disability, sex, sexual orientation, or gender identity.            Thank you!     Thank you for choosing [facility]  for your care. Our goal is always to provide you with excellent care. Hearing back from our patients is one way we can continue to improve our services. Please take a few minutes to complete the  written survey that you may receive in the mail after your visit with us. Thank you!             Your Updated Medication List - Protect others around you: Learn how to safely use, store and throw away your medicines at www.disposemymeds.org.          This list is accurate as of 4/4/18  3:51 PM.  Always use your most recent med list.                   Brand Name Dispense Instructions for use Diagnosis    apixaban ANTICOAGULANT 2.5 MG tablet    ELIQUIS    180 tablet    Take 1 tablet (2.5 mg) by mouth 2 times daily    Atrial flutter, paroxysmal (H)       azaTHIOprine 50 MG tablet    IMURAN    90 tablet    Take 1 tablet (50 mg) by mouth daily    Rheumatoid arthritis involving multiple sites with positive rheumatoid factor (H)       bismuth subsalicylate 262 MG chewable tablet    PEPTO BISMOL    100 tablet    Take 2 tablets (524 mg) by mouth 4 times daily as needed    Dyspepsia and disorder of function of stomach       Blood Pressure Monitor Kit     1 kit    1 kit daily as needed    CHF (congestive heart failure) (H)       bumetanide 1 MG tablet    BUMEX    30 tablet    Take 1 tablet (1 mg) by mouth every morning    Chronic diastolic congestive heart failure (H)       calcium-vitamin D 600-400 MG-UNIT per tablet    CALTRATE     Take 1 tablet by mouth 2 times daily        COMPOUNDED NON-CONTROLLED SUBSTANCE - PHARMACY TO MIX COMPOUNDED MEDICATION    CMPD RX    30 g    Estriol 1mg/gram. Place 1 gram vaginally daily for two weeks, then vaginally twice weekly after.    Atrophic vaginitis       Cranberry 180 MG Caps      Take 1 capsule by mouth daily Unsure of strength        fish oil-omega-3 fatty acids 1000 MG capsule      Take 2 g by mouth daily. 2 capsules daily        FLAGYL PO      Take 500 mg by mouth 2 times daily    Acute infectious diarrhea       folic acid 1 MG tablet    FOLVITE    90 tablet    Take 1 tablet (1 mg) by mouth daily    Oral aphthae       GENTEAL MILD OP      Apply  to eye daily.        hydrocortisone  2.5 % cream     30 g    Apply BID to affected region(s) for 7-10 days.    Rash       levothyroxine 75 MCG tablet    SYNTHROID/LEVOTHROID    90 tablet    TAKE ONE TABLET BY MOUTH EVERY DAY    Hypothyroidism, unspecified type       loratadine 10 MG tablet    CLARITIN     Take 10 mg by mouth every morning        LORazepam 1 MG tablet    ATIVAN    30 tablet    Take 1 tablet (1 mg) by mouth 2 times daily    Anxiety       losartan 50 MG tablet    COZAAR    90 tablet    Take 1 tablet (50 mg) by mouth daily    Heart failure with preserved ejection fraction, NYHA class I (H)       metoprolol tartrate 25 MG tablet    LOPRESSOR    60 tablet    Take 1 tablet (25 mg) by mouth 2 times daily    Atrial fibrillation, unspecified type (H), Hypertension goal BP (blood pressure) < 150/90       nitroGLYcerin 0.4 MG sublingual tablet    NITROSTAT    25 tablet    Place 1 tablet (0.4 mg) under the tongue every 5 minutes as needed for chest pain    Chest pain       * order for DME     1 Box    Equipment being ordered: Dispense face mask. Mrs. Spicer is immunosuppressed due to rheumatoid arthritis.    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 Use with Albuterol.    Hypoxia       order for DME     1 each    Equipment being ordered: Dispense baffle, for use with nebulizer.    Pneumonia of left upper lobe due to Mycoplasma pneumoniae       * order for DME     1 each    Dispense SP Walker-small    Pain of toe of right foot, Status post bunionectomy       PRESERVISION AREDS PO      Take 1 tablet by mouth daily        ranibizumab 0.3 MG/0.05ML Soln    LUCENTIS     0.3 mg by Intravitreal route        ranitidine 150 MG tablet    ZANTAC    60 tablet    Take 1 tablet (150 mg) by mouth 2 times daily    Gastroesophageal reflux disease without esophagitis       REFRESH P.M. Oint      Apply  to eye. Daily at bedtime        saccharomyces boulardii 250  MG capsule    FLORASTOR     Take 250 mg by mouth daily        senna-docusate 8.6-50 MG per tablet    SENOKOT-S;PERICOLACE    120 tablet    Take 2 tablets by mouth At Bedtime Hold if diarrhea occurs.    Constipation, chronic       triamcinolone 0.1 % cream    KENALOG    453.6 g    Apply sparingly to affected area on face three times daily.    Facial lesion       VITAMIN C PO           ZYRTEC ALLERGY 10 MG tablet   Generic drug:  cetirizine      Take 10 mg by mouth At Bedtime        * Notice:  This list has 3 medication(s) that are the same as other medications prescribed for you. Read the directions carefully, and ask your doctor or other care provider to review them with you.

## 2018-04-04 NOTE — MR AVS SNAPSHOT
After Visit Summary   4/4/2018    Linda Spicer    MRN: [id number]           Patient Information     Date Of Birth          6/13/1931        Visit Information        Provider Department      4/4/2018 4:00 PM DEVICE CHECK RN CARD [facility]        Today's Diagnoses     Sick sinus syndrome (H)    -  1      Care Instructions    It was a pleasure to see you in clinic today.  Please do not hesitate to call us if you have any questions or concerns.  Please return to clinic in 6 mos for a pacemaker check.    Next remote 7/9/18    Saloni Condon R.N.  Electrophysiology nurse specialist  [facility]  [address] [place] [address]     Sena Mooney  [phone] business hours    After business hours please call [phone], option #4, and ask for Job code [phone].                  Follow-ups after your visit        Follow-up notes from your care team     Discussed this visit Return in about 5 months (around 9/5/2018) for Pacemaker check.      Your next 10 appointments already scheduled     Apr 04, 2018  4:00 PM CDT   Return Visit with DEVICE CHECK RN CARD   [facility] ([facility])    [address] [place] [address]   [phone]            Apr 23, 2018 11:00 AM CDT   New Visit with Tomi Peña MD   [facility])    [address] [place] [address]   [phone]            May 02, 2018 10:00 AM CDT   Level 2 with [address])    [address] [place] [address]   [phone]            May 08, 2018 10:20 AM CDT   Return Visit with Mario Davenport MD   [facility] ([facility])    [address] [place] [address]   [phone]            Jun 01, 2018 10:30 AM CDT   Return Visit with  Asia Steven PA-C   [facility] ([facility])    [address] [place] [address]   [phone]            Aug 20, 2018 12:30 PM CDT   Return Visit with Emeterio Loza MD   [facility] ([facility])    [address] [place] [address]   [phone]            Sep 05, 2018 10:00 AM CDT   Return Visit with DEVICE CHECK RN CARD   [facility] ([facility])    [address] [place] [address]   [phone]            Jim 15, 2019  9:30 AM CST   Return Visit with Sandra Comer MD   [facility] ([facility])    [address] [place] [address] [phone]              Who to contact     If you have questions or need follow up information about today's clinic visit or your schedule please contact [facility] directly at [phone].  Normal or non-critical lab and imaging results will be communicated to you by Groovesharkhart, letter or phone within 4 business days after the clinic has received the results. If you do not hear from us within 7 days, please contact the clinic through Groovesharkhart or phone. If you have a critical or abnormal lab result, we will notify you by phone as soon as possible.  Submit refill requests through [employer] or call your pharmacy and they will forward the refill request to us. Please allow 3 business days for your refill to be completed.          Additional Information About Your Visit        Groovesharkhart Information     [employer] gives you secure access to your electronic health record. If you see a primary care provider, you can also send messages to your care team and make appointments. If you have questions, please call your primary care clinic.  If you do not have a primary care provider, please call [phone] and they will assist you.      [employer] is an electronic gateway  that provides easy, online access to your medical records. With [facility], you can request a clinic appointment, read your test results, renew a prescription or communicate with your care team.     To access your existing account, please contact your [facility] or call [phone] for assistance.        Care EveryWhere ID     This is your Care EveryWhere ID. This could be used by other organizations to access your [facility] medical records  [id number]         Blood Pressure from Last 3 Encounters:   03/07/18 124/68   03/02/18 129/60   02/27/18 117/76    Weight from Last 3 Encounters:   03/07/18 74.3 kg (163 lb 12.8 oz)   02/27/18 75.3 kg (165 lb 14.4 oz)   02/13/18 73.8 kg (162 lb 9.6 oz)              We Performed the Following     PM DEVICE PROGRAMMING EVAL, DUAL LEAD PACER        Primary Care Provider Office Phone # Fax #    Tre Jenny Lockett [phone]       [address] [facility] [address]        Goals        General    I will complete my health care directive. (pt-stated)     Notes - Note created  3/29/2018  3:07 PM by Behl, Melissa K, RN    Supporting Steps:    Pt has attended  a health care  directive clas-  s, is awaiting  to see her law-  elizabeth to finalize  the document.  10/24/17 Pt  informed of CPR  vs intubation  and information  mailed out to  patient.           I will schedule an appointment with endocrinology. (pt-stated)       Equal Access to Services     [facility]: Hadii rakesh sanchezo Sojordin, waaxda luqadaha, qaybta kaalmada adeegyada, waxcameron marisabel moss . So [facility] [phone].    ATENCIÓN: Si habla sydneyañol, tiene a merchant disposición servicios gratuitos de asistencia lingüística. Llame al [phone].    We comply with applicable federal civil rights laws and [place] laws. We do not discriminate on the basis of race, color, national origin, age, disability, sex, sexual orientation, or gender identity.             Thank you!     Thank you for choosing [facility]  for your care. Our goal is always to provide you with excellent care. Hearing back from our patients is one way we can continue to improve our services. Please take a few minutes to complete the written survey that you may receive in the mail after your visit with us. Thank you!             Your Updated Medication List - Protect others around you: Learn how to safely use, store and throw away your medicines at www.disposemymeds.org.          This list is accurate as of 4/4/18  2:37 PM.  Always use your most recent med list.                   Brand Name Dispense Instructions for use Diagnosis    apixaban ANTICOAGULANT 2.5 MG tablet    ELIQUIS    180 tablet    Take 1 tablet (2.5 mg) by mouth 2 times daily    Atrial flutter, paroxysmal (H)       azaTHIOprine 50 MG tablet    IMURAN    90 tablet    Take 1 tablet (50 mg) by mouth daily    Rheumatoid arthritis involving multiple sites with positive rheumatoid factor (H)       bismuth subsalicylate 262 MG chewable tablet    PEPTO BISMOL    100 tablet    Take 2 tablets (524 mg) by mouth 4 times daily as needed    Dyspepsia and disorder of function of stomach       Blood Pressure Monitor Kit     1 kit    1 kit daily as needed    CHF (congestive heart failure) (H)       bumetanide 1 MG tablet    BUMEX    30 tablet    Take 1 tablet (1 mg) by mouth every morning    Chronic diastolic congestive heart failure (H)       calcium-vitamin D 600-400 MG-UNIT per tablet    CALTRATE     Take 1 tablet by mouth 2 times daily        COMPOUNDED NON-CONTROLLED SUBSTANCE - PHARMACY TO MIX COMPOUNDED MEDICATION    CMPD RX    30 g    Estriol 1mg/gram. Place 1 gram vaginally daily for two weeks, then vaginally twice weekly after.    Atrophic vaginitis       Cranberry 180 MG Caps      Take 1 capsule by mouth daily Unsure of strength        fish oil-omega-3 fatty acids 1000 MG capsule      Take 2 g by mouth daily. 2 capsules daily         FLAGYL PO      Take 500 mg by mouth 2 times daily    Acute infectious diarrhea       folic acid 1 MG tablet    FOLVITE    90 tablet    Take 1 tablet (1 mg) by mouth daily    Oral aphthae       GENTEAL MILD OP      Apply  to eye daily.        hydrocortisone 2.5 % cream     30 g    Apply BID to affected region(s) for 7-10 days.    Rash       levothyroxine 75 MCG tablet    SYNTHROID/LEVOTHROID    90 tablet    TAKE ONE TABLET BY MOUTH EVERY DAY    Hypothyroidism, unspecified type       loratadine 10 MG tablet    CLARITIN     Take 10 mg by mouth every morning        LORazepam 1 MG tablet    ATIVAN    30 tablet    Take 1 tablet (1 mg) by mouth 2 times daily    Anxiety       losartan 50 MG tablet    COZAAR    90 tablet    Take 1 tablet (50 mg) by mouth daily    Heart failure with preserved ejection fraction, NYHA class I (H)       metoprolol tartrate 25 MG tablet    LOPRESSOR    60 tablet    Take 1 tablet (25 mg) by mouth 2 times daily    Atrial fibrillation, unspecified type (H), Hypertension goal BP (blood pressure) < 150/90       nitroGLYcerin 0.4 MG sublingual tablet    NITROSTAT    25 tablet    Place 1 tablet (0.4 mg) under the tongue every 5 minutes as needed for chest pain    Chest pain       * order for DME     1 Box    Equipment being ordered: Dispense face mask. Mrs. Spicer is immunosuppressed due to rheumatoid arthritis.    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 Use with Albuterol.    Hypoxia       order for DME     1 each    Equipment being ordered: Dispense baffle, for use with nebulizer.    Pneumonia of left upper lobe due to Mycoplasma pneumoniae       * order for DME     1 each    Dispense SP Walker-small    Pain of toe of right foot, Status post bunionectomy       PRESERVISION AREDS PO      Take 1 tablet by mouth daily        ranibizumab 0.3 MG/0.05ML Soln    LUCENTIS     0.3 mg by  Intravitreal route        ranitidine 150 MG tablet    ZANTAC    60 tablet    Take 1 tablet (150 mg) by mouth 2 times daily    Gastroesophageal reflux disease without esophagitis       REFRESH P.M. Oint      Apply  to eye. Daily at bedtime        saccharomyces boulardii 250 MG capsule    FLORASTOR     Take 250 mg by mouth daily        senna-docusate 8.6-50 MG per tablet    SENOKOT-S;PERICOLACE    120 tablet    Take 2 tablets by mouth At Bedtime Hold if diarrhea occurs.    Constipation, chronic       triamcinolone 0.1 % cream    KENALOG    453.6 g    Apply sparingly to affected area on face three times daily.    Facial lesion       VITAMIN C PO           ZYRTEC ALLERGY 10 MG tablet   Generic drug:  cetirizine      Take 10 mg by mouth At Bedtime        * Notice:  This list has 3 medication(s) that are the same as other medications prescribed for you. Read the directions carefully, and ask your doctor or other care provider to review them with you.

## 2018-04-04 NOTE — PROGRESS NOTES
Infusion Nursing Note:  Linda Spicer presents today for O[place].    Patient seen by provider today: No   present during visit today: Not Applicable.    Note: N/A.    Intravenous Access:  Peripheral IV placed.    Treatment Conditions:  TSH AND T4 drawn per standing orders per Dr. Lockett.       Post Infusion Assessment:  Patient tolerated infusion without incident.  Site patent and intact, free from redness, edema or discomfort.  No evidence of extravasations.  Access discontinued per protocol.    Discharge Plan:   Discharge instructions reviewed with: Patient and Family.  Patient and/or family verbalized understanding of discharge instructions and all questions answered.  Patient discharged in stable condition accompanied by: son.  Departure Mode: Ambulatory.    Lacey Granger RN

## 2018-04-04 NOTE — PROGRESS NOTES
SPIRITUAL HEALTH SERVICES  SPIRITUAL ASSESSMENT Progress Note  [facility]    (Follow up)  Pt reported she had communion this morning, but thanked  for seeing her.  Her son was present.   is available for pt/family needs.    Daniel Kessler M.Div., [facility]  Staff   Office tel: [phone]

## 2018-04-04 NOTE — PATIENT INSTRUCTIONS
It was a pleasure to see you in clinic today.  Please do not hesitate to call us if you have any questions or concerns.  Please return to clinic in 6 mos for a pacemaker check.    Next remote 7/9/18    Saloni Condon R.N.  Electrophysiology nurse specialist  [facility]  [address] [place] [address]     Sena Mooney  [phone] business hours    After business hours please call [phone], option #4, and ask for Job code [id number].

## 2018-04-06 ENCOUNTER — ALLIED HEALTH/NURSE VISIT (OUTPATIENT)
Dept: CARDIOLOGY | Facility: CLINIC | Age: 83
End: 2018-04-06
Attending: INTERNAL MEDICINE
Payer: MEDICARE

## 2018-04-06 ENCOUNTER — TELEPHONE (OUTPATIENT)
Dept: CARDIOLOGY | Facility: CLINIC | Age: 83
End: 2018-04-06

## 2018-04-06 DIAGNOSIS — I49.5 SICK SINUS SYNDROME (H): Primary | ICD-10-CM

## 2018-04-06 PROCEDURE — 93294 REM INTERROG EVL PM/LDLS PM: CPT | Performed by: INTERNAL MEDICINE

## 2018-04-06 PROCEDURE — 93296 REM INTERROG EVL PM/IDS: CPT | Mod: ZF

## 2018-04-06 NOTE — MR AVS SNAPSHOT
After Visit Summary   4/6/2018    Linda Spicer    MRN: [id number]           Patient Information     Date Of Birth          6/13/1931        Visit Information        Provider Department      4/6/2018 6:00 AM  ICD REMOTE [facility]        Today's Diagnoses     Sick sinus syndrome (H)    -  1       Follow-ups after your visit        Your next 10 appointments already scheduled     Apr 23, 2018 11:00 AM CDT   [facility] Tomi [facility]   [facility]    [address] [place] [address]   [phone]            May 02, 2018 10:00 AM CDT   Level 2 with [address] ([facility])    [address] [place] [address]   [phone]            May 08, 2018 10:20 AM CDT   Return Visit with Mario Davenport MD   [facility] ([facility]    [address] [place] [address]   [phone]            May 30, 2018 10:00 AM CDT   Level 2 with [address] ([facility])    [address] [place] [address]   [phone]            Jun 01, 2018 10:30 AM CDT   Return Visit with Asia Steven PA-C   [facility]    [address] [place] [address]   [phone]            Aug 20, 2018 12:30 PM CDT   [facility] Visit with Emeterio Loza MD   [facility]    [address] [facility] [address]   [phone]            Sep 05, 2018 10:00 AM CDT   Return Visit with DEVICE CHECK RN CARD   [facility]    [address] [address] [address]   [phone]            Jim 15, 2019  9:30 AM CST   Return Visit with Sandra Comer MD   [facility]  [facility])    [address] [place] [address] [phone]              Who to contact     If you have questions or need follow up information about today's clinic visit or your schedule please contact [facility] directly at [phone].  Normal or non-critical lab and imaging results will be communicated to you by MyChart, letter or phone within 4 business days after the clinic has received the results. If you do not hear from us within 7 days, please contact the clinic through MyChart or phone. If you have a critical or abnormal lab result, we will notify you by phone as soon as possible.  Submit refill requests through Looklet or call your pharmacy and they will forward the refill request to us. Please allow 3 business days for your refill to be completed.          Additional Information About Your Visit        MyChart Information     Looklet gives you secure access to your electronic health record. If you see a primary care provider, you can also send messages to your care team and make appointments. If you have questions, please call your primary care clinic.  If you do not have a primary care provider, please call [phone] and they will assist you.        Care EveryWhere ID     This is your Care EveryWhere ID. This could be used by other organizations to access your [place] medical records  [id number]         Blood Pressure from Last 3 Encounters:   04/04/18 130/56   03/07/18 124/68   03/02/18 129/60    Weight from Last 3 Encounters:   04/04/18 76.7 kg (169 lb)   03/07/18 74.3 kg (163 lb 12.8 oz)   02/27/18 75.3 kg (165 lb 14.4 oz)              We Performed the Following     INTERROGATION DEVICE EVAL REMOTE, PACER/ICD        Primary Care Provider Office Phone # Fax #    Tre Lockett [phone]       [address]  [facility] [address]        Goals        General    I will complete my health care directive. (pt-stated)     Notes - Note  created  3/29/2018  3:07 PM by Behl, Melissa K, RN    Supporting Steps:    Pt has attended  a health care  directive clas-  s, is awaiting  to see her law-  elizabeth to finalize  the document.  10/24/17 Pt  informed of CPR  vs intubation  and information  mailed out to  patient.           I will schedule an appointment with endocrinology. (pt-stated)       Equal Access to Services     [facility]: Hadii aad ku hadasho Soomaali, waaxda luqadaha, qaybta kaalmada adeegyada, waxay idiin hayaan adenicolas kamara lacristi . So [facility] [phone].    ATENCIÓN: Si habla español, tiene a merchant disposición servicios gratuitos de asistencia lingüística. Llame al [phone].    We comply with applicable federal civil rights laws and [place] laws. We do not discriminate on the basis of race, color, national origin, age, disability, sex, sexual orientation, or gender identity.            Thank you!     Thank you for choosing [facility]  for your care. Our goal is always to provide you with excellent care. Hearing back from our patients is one way we can continue to improve our services. Please take a few minutes to complete the written survey that you may receive in the mail after your visit with us. Thank you!             Your Updated Medication List - Protect others around you: Learn how to safely use, store and throw away your medicines at www.disposemymeds.org.          This list is accurate as of 4/6/18 11:59 PM.  Always use your most recent med list.                   Brand Name Dispense Instructions for use Diagnosis    apixaban ANTICOAGULANT 2.5 MG tablet    ELIQUIS    180 tablet    Take 1 tablet (2.5 mg) by mouth 2 times daily    Atrial flutter, paroxysmal (H)       azaTHIOprine 50 MG tablet    IMURAN    90 tablet    Take 1 tablet (50 mg) by mouth daily    Rheumatoid arthritis involving multiple sites with positive rheumatoid factor (H)       bismuth subsalicylate 262 MG chewable tablet    PEPTO BISMOL    100 tablet     Take 2 tablets (524 mg) by mouth 4 times daily as needed    Dyspepsia and disorder of function of stomach       Blood Pressure Monitor Kit     1 kit    1 kit daily as needed    CHF (congestive heart failure) (H)       bumetanide 1 MG tablet    BUMEX    30 tablet    Take 1 tablet (1 mg) by mouth every morning    Chronic diastolic congestive heart failure (H)       calcium-vitamin D 600-400 MG-UNIT per tablet    CALTRATE     Take 1 tablet by mouth 2 times daily        COMPOUNDED NON-CONTROLLED SUBSTANCE - PHARMACY TO MIX COMPOUNDED MEDICATION    CMPD RX    30 g    Estriol 1mg/gram. Place 1 gram vaginally daily for two weeks, then vaginally twice weekly after.    Atrophic vaginitis       Cranberry 180 MG Caps      Take 1 capsule by mouth daily Unsure of strength        fish oil-omega-3 fatty acids 1000 MG capsule      Take 2 g by mouth daily. 2 capsules daily        FLAGYL PO      Take 500 mg by mouth 2 times daily    Acute infectious diarrhea       folic acid 1 MG tablet    FOLVITE    90 tablet    Take 1 tablet (1 mg) by mouth daily    Oral aphthae       GENTEAL MILD OP      Apply  to eye daily.        hydrocortisone 2.5 % cream     30 g    Apply BID to affected region(s) for 7-10 days.    Rash       levothyroxine 75 MCG tablet    SYNTHROID/LEVOTHROID    90 tablet    TAKE ONE TABLET BY MOUTH EVERY DAY    Hypothyroidism, unspecified type       loratadine 10 MG tablet    CLARITIN     Take 10 mg by mouth every morning        LORazepam 1 MG tablet    ATIVAN    30 tablet    Take 1 tablet (1 mg) by mouth 2 times daily    Anxiety       losartan 50 MG tablet    COZAAR    90 tablet    Take 1 tablet (50 mg) by mouth daily    Heart failure with preserved ejection fraction, NYHA class I (H)       metoprolol tartrate 25 MG tablet    LOPRESSOR    60 tablet    Take 1 tablet (25 mg) by mouth 2 times daily    Atrial fibrillation, unspecified type (H), Hypertension goal BP (blood pressure) < 150/90       nitroGLYcerin 0.4 MG sublingual  tablet    NITROSTAT    25 tablet    Place 1 tablet (0.4 mg) under the tongue every 5 minutes as needed for chest pain    Chest pain       * order for DME     1 Box    Equipment being ordered: Dispense face mask. Mrs. Spicre is immunosuppressed due to rheumatoid arthritis.    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 Use with Albuterol.    Hypoxia       order for DME     1 each    Equipment being ordered: Dispense baffle, for use with nebulizer.    Pneumonia of left upper lobe due to Mycoplasma pneumoniae       * order for DME     1 each    Dispense SP Walker-small    Pain of toe of right foot, Status post bunionectomy       PRESERVISION AREDS PO      Take 1 tablet by mouth daily        ranibizumab 0.3 MG/0.05ML Soln    LUCENTIS     0.3 mg by Intravitreal route        ranitidine 150 MG tablet    ZANTAC    60 tablet    Take 1 tablet (150 mg) by mouth 2 times daily    Gastroesophageal reflux disease without esophagitis       REFRESH P.M. Oint      Apply  to eye. Daily at bedtime        saccharomyces boulardii 250 MG capsule    FLORASTOR     Take 250 mg by mouth daily        senna-docusate 8.6-50 MG per tablet    SENOKOT-S;PERICOLACE    120 tablet    Take 2 tablets by mouth At Bedtime Hold if diarrhea occurs.    Constipation, chronic       triamcinolone 0.1 % cream    KENALOG    453.6 g    Apply sparingly to affected area on face three times daily.    Facial lesion       VITAMIN C PO           ZYRTEC ALLERGY 10 MG tablet   Generic drug:  cetirizine      Take 10 mg by mouth At Bedtime        * Notice:  This list has 3 medication(s) that are the same as other medications prescribed for you. Read the directions carefully, and ask your doctor or other care provider to review them with you.

## 2018-04-07 NOTE — TELEPHONE ENCOUNTER
Preliminary Device Interrogation Results.  Final physician signed paceart report to be scanned and attached.    [employer] Carelink remote pacemaker transmission received and reviewed. Device transmission sent per MD orders. Pt called reporting she feels like she's been in AF all day.she states she has been dizzy with higher HR and BP.  Her presenting rhythm is AS/ VS @ 80 bpm with frequent PACs, some  Non-conducted. She had 2 episodes of AF today, one at 0700 lasting 33 minutes and one at 2100 lasting 2 minutes. AF burden= 5.5%. She is taking eliquis. Normal device function. AP= 84% and = 8%. No short V-V intervals recorded. Lead trends appear stable. Battery estimates 8.5 years to ZENAIDA. Pt notified of transmission results. Pt was instructed to seek medical attention if she continues to not feel well. Her symptoms don't appear to correlate with AF episodes. She verbalized understanding.  Remote pacemaker transmission

## 2018-04-09 NOTE — PROGRESS NOTES
Preliminary Device Interrogation Results.  Final physician signed paceart report to be scanned and attached.     [email or web address] Carelink remote pacemaker transmission received and reviewed. Device transmission sent per MD orders. Pt called reporting she feels like she's been in AF all day.she states she has been dizzy with higher HR and BP.  Her presenting rhythm is AS/ VS @ 80 bpm with frequent PACs, some  Non-conducted. She had 2 episodes of AF today, one at 0700 lasting 33 minutes and one at 2100 lasting 2 minutes. AF burden= 5.5%. She is taking eliquis. Normal device function. AP= 84% and = 8%. No short V-V intervals recorded. Lead trends appear stable. Battery estimates 8.5 years to ZENAIDA. Pt notified of transmission results. Pt was instructed to seek medical attention if she continues to not feel well. Her symptoms don't appear to correlate with AF episodes. She verbalized understanding.  Kassandra Martinez RN    Remote pacemaker transmission

## 2018-04-23 ENCOUNTER — OFFICE VISIT (OUTPATIENT)
Dept: ENDOCRINOLOGY | Facility: CLINIC | Age: 83
End: 2018-04-23
Attending: INTERNAL MEDICINE
Payer: MEDICARE

## 2018-04-23 VITALS
DIASTOLIC BLOOD PRESSURE: 69 MMHG | HEART RATE: 64 BPM | WEIGHT: 164.02 LBS | SYSTOLIC BLOOD PRESSURE: 140 MMHG | BODY MASS INDEX: 28 KG/M2 | HEIGHT: 64 IN | OXYGEN SATURATION: 97 %

## 2018-04-23 DIAGNOSIS — M81.0 AGE-RELATED OSTEOPOROSIS WITHOUT CURRENT PATHOLOGICAL FRACTURE: ICD-10-CM

## 2018-04-23 PROCEDURE — 99214 OFFICE O/P EST MOD 30 MIN: CPT | Performed by: INTERNAL MEDICINE

## 2018-04-23 NOTE — PROGRESS NOTES
Endocrinology Note         Linda is a 86 year old female presents today for osteoporosis.    HPI  Linda Spicer is a 86 year old female with hx of rheumatoid arthritis, RA associated pulmonary fibrosis, Afib s/p pacemaker, CKD state 3, CHF presents today for osteoporosis.    Linda has had RA since 1973 and was on chronic prednisone for 40 years before stopping it around 5036-1564. She is currently on immunomodulator. She has diagnosis of osteoporosis for long time as well. The earliest DXA I reviewed was in 2011 when T-score was -2.7 at lumbar spine. The recent DXA was 6/2017 when T-score was -3.1 at lumbar spine with significant bone loss in lumbar spine and hips. She stated that she was on either Fosamax or Boniva for a long time at least Fosamax from 5108-6375 and Boniva from 5173-2607. She stated that she was even on something before that. She was tried on Evista last year but could not tolerate due to side effects. She has no side effects with oral bisphosphonates.    She reports history of left humeral fracture when she fell. She did have right toe fracture when she stepped against something a long time ago. She increased calcium+vitamin D to 1 pill tid for several months. She has lactose intolerance and could not tolerate milk or other dairy product very well. She lives in her own apartment with her . She walks well without walker. She walks about 1 mile per day. She thinks that her mother had osteoporosis due to bend-over posture.     Past Medical History  Past Medical History:   Diagnosis Date     Adjustment disorder with anxious mood 5/18/2015     Advanced directives, counseling/discussion 8/30/2012    Patient states has Advance Directive and will bring in a copy to clinic. 8/30/2012   Tevin May  [facility] Medical Assistant \       Anemia 9/25/2015     Basal cell cancer      CHF (congestive heart failure) (H) 9/18/2014     CKD (chronic kidney disease) stage 3, GFR 30-59 ml/min 9/29/2015      DDD (degenerative disc disease), lumbar 9/25/2015     Diffuse idiopathic pulmonary fibrosis (H) 5/6/2013     Encounter for palliative care 5/18/2015     History of blood transfusion 9/29/2015     Hypertension goal BP (blood pressure) < 140/90 9/7/2012     Hypothyroid 9/7/2012     Irritable bowel syndrome 10/29/2013     Macular degeneration      Macular degeneration, 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     Nondisplaced spiral fracture of shaft of humerus      Osteoporosis 8/13/2013     Imo Update utility     RA (rheumatoid arthritis) (H) 5/7/2013     Rheumatic fever      Shingles      Spinal stenosis of lumbar region with neurogenic claudication 9/14/2015       Allergies  Allergies   Allergen Reactions     Cephalexin Hcl Diarrhea     Gabapentin Other (See Comments)     Dizzsiness     Naproxen GI Disturbance     Perfume      Lactase Other (See Comments)     Macrobid [Nitrofurantoin Anhydrous]      Possibly related to lung disease      Sulfa Drugs      Throat swelling     Xanax [Alprazolam] Other (See Comments)     Dizziness      Medications  Current Outpatient Prescriptions   Medication Sig Dispense Refill     apixaban ANTICOAGULANT (ELIQUIS) 2.5 MG tablet Take 1 tablet (2.5 mg) by mouth 2 times daily 180 tablet 3     Artificial Tear Ointment (REFRESH P.M.) OINT Apply  to eye. Daily at bedtime          Ascorbic Acid (VITAMIN C PO)        azaTHIOprine (IMURAN) 50 MG tablet Take 1 tablet (50 mg) by mouth daily 90 tablet 2     bismuth subsalicylate (PEPTO BISMOL) 262 MG chewable tablet Take 2 tablets (524 mg) by mouth 4 times daily as needed 100 tablet 11     Blood Pressure Monitor KIT 1 kit daily as needed 1 kit 0     bumetanide (BUMEX) 1 MG tablet Take 1 tablet (1 mg) by mouth every morning 30 tablet 11     calcium-vitamin D (CALTRATE) 600-400 MG-UNIT per tablet Take 1 tablet by mouth 2 times daily        cetirizine (ZYRTEC ALLERGY) 10 MG tablet Take 10 mg by mouth At Bedtime       COMPOUNDED NON-CONTROLLED SUBSTANCE (CMPD RX) -  PHARMACY TO MIX COMPOUNDED MEDICATION Estriol 1mg/gram. Place 1 gram vaginally daily for two weeks, then vaginally twice weekly after. 30 g 6     Cranberry 180 MG CAPS Take 1 capsule by mouth daily Unsure of strength       fish oil-omega-3 fatty acids (FISH OIL) 1000 MG capsule Take 2 g by mouth daily. 2 capsules daily            folic acid (FOLVITE) 1 MG tablet Take 1 tablet (1 mg) by mouth daily 90 tablet 3     hydrocortisone 2.5 % cream Apply BID to affected region(s) for 7-10 days. (Patient not taking: Reported on 2/27/2018) 30 g 0     Hypromellose (GENTEAL MILD OP) Apply  to eye daily.       levothyroxine (SYNTHROID/LEVOTHROID) 75 MCG tablet TAKE ONE TABLET BY MOUTH EVERY DAY 90 tablet 2     loratadine (CLARITIN) 10 MG tablet Take 10 mg by mouth every morning       LORazepam (ATIVAN) 1 MG tablet Take 1 tablet (1 mg) by mouth 2 times daily 30 tablet 0     losartan (COZAAR) 50 MG tablet Take 1 tablet (50 mg) by mouth daily 90 tablet 1     metoprolol (LOPRESSOR) 25 MG tablet Take 1 tablet (25 mg) by mouth 2 times daily 60 tablet 5     MetroNIDAZOLE (FLAGYL PO) Take 500 mg by mouth 2 times daily        Multiple Vitamins-Minerals (PRESERVISION AREDS PO) Take 1 tablet by mouth daily        nitroglycerin (NITROSTAT) 0.4 MG SL tablet Place 1 tablet (0.4 mg) under the tongue every 5 minutes as needed for chest pain 25 tablet 0     order for DME Equipment being ordered: Dispense face mask.  Mrs. Spicer is immunosuppressed due to rheumatoid arthritis. 1 Box 11     order for DME Equipment being ordered: Nebulizer. Use with Albuterol. 1 each 0     order for DME Equipment being ordered: Dispense baffle, for use with nebulizer. 1 each 0     order for DME Dispense SP Walker-small 1 each 0     ranibizumab (LUCENTIS) 0.3 MG/0.05ML SOLN 0.3 mg by Intravitreal route       ranitidine (ZANTAC) 150 MG tablet Take 1 tablet (150 mg) by mouth 2 times daily 60 tablet 5     saccharomyces boulardii (FLORASTOR) 250 MG capsule Take 250 mg by  "mouth daily       senna-docusate (SENOKOT-S;PERICOLACE) 8.6-50 MG per tablet Take 2 tablets by mouth At Bedtime Hold if diarrhea occurs. 120 tablet 11     triamcinolone (KENALOG) 0.1 % cream Apply sparingly to affected area on face three times daily. 453.6 g 5     Family History  family history includes Blood Disease in her daughter; DIABETES in her daughter and son; Hypertension in her mother; Psychotic Disorder in her father.  Social History  Social History   Substance Use Topics     Smoking status: Never Smoker     Smokeless tobacco: Never Used     Alcohol use Yes      Comment: rare wine        ROS  Constitutional: stable weight, low energy  Eyes: no vision change, diplopia or red eyes   Neck: no difficulty swallowing, no choking, no neck pain, no neck swelling  Cardiovascular: no chest pain, palpitations  Respiratory: + dyspnea, + shortness of breath due to pulmonary fibrosis  GI: no nausea, vomiting, diarrhea or constipation, no abdominal pain   : no change in urine, no dysuria or hematuria  Musculoskeletal: no joint or muscle pain or swelling   Integumentary: no concerning lesions   Neuro: no loss of strength or sensation, no numbness or tingling, no tremor, no dizziness, no headache   Endo: no polyuria or polydipsia, no temperature intolerance   Heme/Lymph: no concerning bumps, no bleeding problems   Allergy: no environmental allergies   Psych: no depression or anxiety, no sleep problems.    Physical Exam  /69  Pulse 64  Ht 1.626 m (5' 4.02\")  Wt 74.4 kg (164 lb 0.4 oz)  SpO2 97%  BMI 28.14 kg/m2  Body mass index is 28.14 kg/(m^2).  Constitutional: no distress, comfortable, pleasant   Eyes: anicteric, normal extra-ocular movements, no lid lag or retraction  Neck: no thyromegaly, no discrete nodule  Cardiovascular: regular rate and rhythm, normal S1 and S2, no murmurs  Respiratory: +crakles at lower lungs field, normal breath sounds   Gastrointestinal:  nontender, no hepatomegaly, no masses "   Musculoskeletal: no edema, mild kyphosis  Skin: no concerning lesions, no jaundice   Neurological: cranial nerves intact, 2+ reflexes at patella, normal gait, no tremor on outstretched hands bilaterally  Psychological: appropriate mood   Lymphatic: no cervical  lymphadenopathy.      RESULTS  DXA 7/26/2011  /      DXA 2/22/2013      DXA 5/20/2013  [facility] [place]  Conclusions:      a. Based on the most negative and valid T -score of - 3.2 at the   level of the L1 - L2 lumbar spine, this patient has osteoporosis.     DXA 6/29/2015  Lumbar spine T-score in region of L1-L4 = -2.2      HIPS:  Mean total hip T-score: -2.4     Left femoral neck T-score = -2.2  Right femoral neck T-score= 1.6      Radius 33% T-score = NA    DXA 6/23/2017  Lumbar spine T-score in region of L2, excluding L3 and L4 = -3.1   L1-4 percent change: -10.7%      HIPS:  Mean total hip T-score: -2.7  Mean total hip percent change:-4.1%      Left femoral neck T-score = -2.7  Right femoral neck T-score= -2.3      COMPARISON TO PRIOR:  Percent change in the spine and hips is significant accounting to the precision errors for this facility.       IMPRESSION:  Osteoporosis    ASSESSMENT:    Linda Spicer is a 86 year old female with hx of rheumatoid arthritis, RA associated pulmonary fibrosis, Afib s/p pacemaker, CKD state 3, CHF presents today for osteoporosis.    1) Osteoporosis: long standing secondary to RA, previous chronic use of steroid, thin body habitus and family hx of osteoporosis. She has been on long term use of oral bisphosphonates. Stopped for 8 months. Recent DXA in 6/2017 showed osteoporosis with worsening BMD at lumbar spine and hips. I would recommend to change to either Prolia or Forteo given recent DXA result and CKD state 3. Will check with insurance coverage.  - continue calcium and vitamin D 1 pill tid  - continue with fall precaution and weight bearing exercise    PLAN:   Discussed switching to Prolia or Forteo  Will check with  insurance  RTC 1 year    Tomi Peña MD     Division of Diabetes and Endocrinology  Department of Medicine  [phone]

## 2018-04-23 NOTE — MR AVS SNAPSHOT
After Visit Summary   4/23/2018    Linda Spicer    MRN: [id number]           Patient Information     Date Of Birth          6/13/1931        Visit Information        Provider Department      4/23/2018 11:00 AM Tomi Peña MD [facility]        Today's Diagnoses     Age-related osteoporosis without current pathological fracture          Care Instructions    Clinic will contact you regarding coverage on Prolia or Forteo           Follow-ups after your visit        Your next 10 appointments already scheduled     May 02, 2018 10:00 AM CDT   Level 2 with [address] ([facility])    [address] [address] [place] [id number]   [phone]            May 08, 2018 10:20 AM CDT   Return Visit with Mario Davenport MD   [facility] ([facility])    [address] [place] [address]   [phone]            May 30, 2018 10:00 AM CDT   Level 2 with [address] ([facility])    [address] [place] [id number]   [phone]            Jun 01, 2018 10:30 AM CDT   Return Visit with Asia Steven PA-C   [facility])    [address] [place] [address]   [phone]            Aug 20, 2018 12:30 PM CDT   Return Visit with Emeterio Loza MD   [facility] ([facility])    [address] [place] [address]   [phone]            Sep 05, 2018 10:00 AM CDT   Return Visit with DEVICE CHECK RN CARD   [facility])    [address] [place] [id number]   [phone]            Jim 15, 2019  9:30 AM CST   Return Visit with Sandra Comer MD   [facility])    [address] [place] [address]  "[place] [phone]              Who to contact     If you have questions or need follow up information about today's clinic visit or your schedule please contact [facility] directly at [phone].  Normal or non-critical lab and imaging results will be communicated to you by [employer], letter or phone within 4 business days after the clinic has received the results. If you do not hear from us within 7 days, please contact the clinic through [employer] or phone. If you have a critical or abnormal lab result, we will notify you by phone as soon as possible.  Submit refill requests through [employer] or call your pharmacy and they will forward the refill request to us. Please allow 3 business days for your refill to be completed.          Additional Information About Your Visit        [employer] Information     [employer] gives you secure access to your electronic health record. If you see a primary care provider, you can also send messages to your care team and make appointments. If you have questions, please call your primary care clinic.  If you do not have a primary care provider, please call [phone] and they will assist you.      [employer] is an electronic gateway that provides easy, online access to your medical records. With [employer], you can request a clinic appointment, read your test results, renew a prescription or communicate with your care team.     To access your existing account, please contact your [facility] or call [phone] for assistance.        Care EveryWhere ID     This is your Care EveryWhere ID. This could be used by other organizations to access your [facility] medical records  [id number]        Your Vitals Were     Pulse Height Pulse Oximetry BMI (Body Mass Index)          64 1.626 m (5' 4.02\") 97% 28.14 kg/m2         Blood Pressure from Last 3 Encounters:   04/23/18 140/69   04/04/18 130/56   03/07/18 124/68    Weight from Last 3 " Encounters:   04/23/18 74.4 kg (164 lb 0.4 oz)   04/04/18 76.7 kg (169 lb)   03/07/18 74.3 kg (163 lb 12.8 oz)              We Performed the Following     ENDOCRINOLOGY ADULT REFERRAL        Primary Care Provider Office Phone # Fax #    Tre Lockett [phone]       [address]  [facility] [address]        Goals        General    I will complete my health care directive. (pt-stated)     Notes - Note created  3/29/2018  3:07 PM by Behl, Melissa K, RN    Supporting Steps:    Pt has attended  a health care  directive clas-  s, is awaiting  to see her law-  elizabeth to finalize  the document.  10/24/17 Pt  informed of CPR  vs intubation  and information  mailed out to  patient.           I will schedule an appointment with endocrinology. (pt-stated)       Equal Access to Services     [facility]: Hadii rakesh schuster hadasho Sojordin, waaxda luqadaha, qaybta kaalmada adeegyada, zahraa moss . So [place] [phone].    ATENCIÓN: Si habla español, tiene a merchant disposición servicios gratuitos de asistencia lingüística. Llame al [phone].    We comply with applicable federal civil rights laws and [place] laws. We do not discriminate on the basis of race, color, national origin, age, disability, sex, sexual orientation, or gender identity.            Thank you!     Thank you for choosing [facility]  for your care. Our goal is always to provide you with excellent care. Hearing back from our patients is one way we can continue to improve our services. Please take a few minutes to complete the written survey that you may receive in the mail after your visit with us. Thank you!             Your Updated Medication List - Protect others around you: Learn how to safely use, store and throw away your medicines at www.disposemymeds.org.          This list is accurate as of 4/23/18 11:44 AM.  Always use your most recent med list.                   Brand Name  Dispense Instructions for use Diagnosis    apixaban ANTICOAGULANT 2.5 MG tablet    ELIQUIS    180 tablet    Take 1 tablet (2.5 mg) by mouth 2 times daily    Atrial flutter, paroxysmal (H)       azaTHIOprine 50 MG tablet    IMURAN    90 tablet    Take 1 tablet (50 mg) by mouth daily    Rheumatoid arthritis involving multiple sites with positive rheumatoid factor (H)       bismuth subsalicylate 262 MG chewable tablet    PEPTO BISMOL    100 tablet    Take 2 tablets (524 mg) by mouth 4 times daily as needed    Dyspepsia and disorder of function of stomach       Blood Pressure Monitor Kit     1 kit    1 kit daily as needed    CHF (congestive heart failure) (H)       bumetanide 1 MG tablet    BUMEX    30 tablet    Take 1 tablet (1 mg) by mouth every morning    Chronic diastolic congestive heart failure (H)       calcium-vitamin D 600-400 MG-UNIT per tablet    CALTRATE     Take 1 tablet by mouth 2 times daily        COMPOUNDED NON-CONTROLLED SUBSTANCE - PHARMACY TO MIX COMPOUNDED MEDICATION    CMPD RX    30 g    Estriol 1mg/gram. Place 1 gram vaginally daily for two weeks, then vaginally twice weekly after.    Atrophic vaginitis       Cranberry 180 MG Caps      Take 1 capsule by mouth daily Unsure of strength        fish oil-omega-3 fatty acids 1000 MG capsule      Take 2 g by mouth daily. 2 capsules daily        FLAGYL PO      Take 500 mg by mouth 2 times daily    Acute infectious diarrhea       folic acid 1 MG tablet    FOLVITE    90 tablet    Take 1 tablet (1 mg) by mouth daily    Oral aphthae       GENTEAL MILD OP      Apply  to eye daily.        hydrocortisone 2.5 % cream     30 g    Apply BID to affected region(s) for 7-10 days.    Rash       levothyroxine 75 MCG tablet    SYNTHROID/LEVOTHROID    90 tablet    TAKE ONE TABLET BY MOUTH EVERY DAY    Hypothyroidism, unspecified type       loratadine 10 MG tablet    CLARITIN     Take 10 mg by mouth every morning        LORazepam 1 MG tablet    ATIVAN    30 tablet    Take  1 tablet (1 mg) by mouth 2 times daily    Anxiety       losartan 50 MG tablet    COZAAR    90 tablet    Take 1 tablet (50 mg) by mouth daily    Heart failure with preserved ejection fraction, NYHA class I (H)       metoprolol tartrate 25 MG tablet    LOPRESSOR    60 tablet    Take 1 tablet (25 mg) by mouth 2 times daily    Atrial fibrillation, unspecified type (H), Hypertension goal BP (blood pressure) < 150/90       nitroGLYcerin 0.4 MG sublingual tablet    NITROSTAT    25 tablet    Place 1 tablet (0.4 mg) under the tongue every 5 minutes as needed for chest pain    Chest pain       * order for DME     1 Box    Equipment being ordered: Dispense face mask. Mrs. Spicer is immunosuppressed due to rheumatoid arthritis.    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 Use with Albuterol.    Hypoxia       order for DME     1 each    Equipment being ordered: Dispense baffle, for use with nebulizer.    Pneumonia of left upper lobe due to Mycoplasma pneumoniae       * order for DME     1 each    Dispense SP Walker-small    Pain of toe of right foot, Status post bunionectomy       PRESERVISION AREDS PO      Take 1 tablet by mouth daily        ranibizumab 0.3 MG/0.05ML Soln    LUCENTIS     0.3 mg by Intravitreal route        ranitidine 150 MG tablet    ZANTAC    60 tablet    Take 1 tablet (150 mg) by mouth 2 times daily    Gastroesophageal reflux disease without esophagitis       REFRESH P.M. Oint      Apply  to eye. Daily at bedtime        saccharomyces boulardii 250 MG capsule    FLORASTOR     Take 250 mg by mouth daily        senna-docusate 8.6-50 MG per tablet    SENOKOT-S;PERICOLACE    120 tablet    Take 2 tablets by mouth At Bedtime Hold if diarrhea occurs.    Constipation, chronic       triamcinolone 0.1 % cream    KENALOG    453.6 g    Apply sparingly to affected area on face three times daily.    Facial lesion        VITAMIN C PO           ZYRTEC ALLERGY 10 MG tablet   Generic drug:  cetirizine      Take 10 mg by mouth At Bedtime        * Notice:  This list has 3 medication(s) that are the same as other medications prescribed for you. Read the directions carefully, and ask your doctor or other care provider to review them with you.

## 2018-04-23 NOTE — LETTER
4/23/2018         RE: Linda Spicer  [address]   [place] [address]        Dear Colleague,    Thank you for referring your patient, Linda Spicer, to the [facility]. Please see a copy of my visit note below.         Endocrinology Note         Linda is a 86 year old female presents today for osteoporosis.    HPI  Linda Spicer is a 86 year old female with hx of rheumatoid arthritis, RA associated pulmonary fibrosis, Afib s/p pacemaker, CKD state 3, CHF presents today for osteoporosis.    Linda has had RA since 1973 and was on chronic prednisone for 40 years before stopping it around 2594-2422. She is currently on immunomodulator. She has diagnosis of osteoporosis for long time as well. The earliest DXA I reviewed was in 2011 when T-score was -2.7 at lumbar spine. The recent DXA was 6/2017 when T-score was -3.1 at lumbar spine with significant bone loss in lumbar spine and hips. She stated that she was on either Fosamax or Boniva for a long time at least Fosamax from 7243-8312 and Boniva from 2826-4633. She stated that she was even on something before that. She was tried on Evista last year but could not tolerate due to side effects. She has no side effects with oral bisphosphonates.    She reports history of left humeral fracture when she fell. She did have right toe fracture when she stepped against something a long time ago. She increased calcium+vitamin D to 1 pill tid for several months. She has lactose intolerance and could not tolerate milk or other dairy product very well. She lives in her own apartment with her . She walks well without walker. She walks about 1 mile per day. She thinks that her mother had osteoporosis due to bend-over posture.     Past Medical History  Past Medical History:   Diagnosis Date     Adjustment disorder with anxious mood 5/18/2015     Advanced directives, counseling/discussion 8/30/2012    Patient states has Advance  Directive and will bring in a copy to clinic. 8/30/2012   Tevin May  [facility] Medical Assistant \       Anemia 9/25/2015     Basal cell cancer      CHF (congestive heart failure) (H) 9/18/2014     CKD (chronic kidney disease) stage 3, GFR 30-59 ml/min 9/29/2015     DDD (degenerative disc disease), lumbar 9/25/2015     Diffuse idiopathic pulmonary fibrosis (H) 5/6/2013     Encounter for palliative care 5/18/2015     History of blood transfusion 9/29/2015     Hypertension goal BP (blood pressure) < 140/90 9/7/2012     Hypothyroid 9/7/2012     Irritable bowel syndrome 10/29/2013     Macular degeneration      Macular degeneration, 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     Nondisplaced spiral fracture of shaft of humerus      Osteoporosis 8/13/2013     Im[employer] Update utility     RA (rheumatoid arthritis) (H) 5/7/2013     Rheumatic fever      Shingles      Spinal stenosis of lumbar region with neurogenic claudication 9/14/2015       Allergies  Allergies   Allergen Reactions     Cephalexin Hcl Diarrhea     Gabapentin Other (See Comments)     Dizzsiness     Naproxen GI Disturbance     Perfume      Lactase Other (See Comments)     Macrobid [Nitrofurantoin Anhydrous]      Possibly related to lung disease      Sulfa Drugs      Throat swelling     Xanax [Alprazolam] Other (See Comments)     Dizziness      Medications  Current Outpatient Prescriptions   Medication Sig Dispense Refill     apixaban ANTICOAGULANT (ELIQUIS) 2.5 MG tablet Take 1 tablet (2.5 mg) by mouth 2 times daily 180 tablet 3     Artificial Tear Ointment (REFRESH P.M.) OINT Apply  to eye. Daily at bedtime          Ascorbic Acid (VITAMIN C PO)        azaTHIOprine (IMURAN) 50 MG tablet Take 1 tablet (50 mg) by mouth daily 90 tablet 2     bismuth subsalicylate (PEPTO BISMOL) 262 MG chewable tablet Take 2 tablets (524 mg) by mouth 4 times daily as needed 100 tablet 11     Blood Pressure Monitor KIT 1 kit daily as needed 1 kit 0     bumetanide (BUMEX) 1 MG tablet Take 1 tablet (1 mg) by  mouth every morning 30 tablet 11     calcium-vitamin D (CALTRATE) 600-400 MG-UNIT per tablet Take 1 tablet by mouth 2 times daily        cetirizine (ZYRTEC ALLERGY) 10 MG tablet Take 10 mg by mouth At Bedtime       COMPOUNDED NON-CONTROLLED SUBSTANCE (CMPD RX) - PHARMACY TO MIX COMPOUNDED MEDICATION Estriol 1mg/gram. Place 1 gram vaginally daily for two weeks, then vaginally twice weekly after. 30 g 6     Cranberry 180 MG CAPS Take 1 capsule by mouth daily Unsure of strength       fish oil-omega-3 fatty acids (FISH OIL) 1000 MG capsule Take 2 g by mouth daily. 2 capsules daily            folic acid (FOLVITE) 1 MG tablet Take 1 tablet (1 mg) by mouth daily 90 tablet 3     hydrocortisone 2.5 % cream Apply BID to affected region(s) for 7-10 days. (Patient not taking: Reported on 2/27/2018) 30 g 0     Hypromellose (GENTEAL MILD OP) Apply  to eye daily.       levothyroxine (SYNTHROID/LEVOTHROID) 75 MCG tablet TAKE ONE TABLET BY MOUTH EVERY DAY 90 tablet 2     loratadine (CLARITIN) 10 MG tablet Take 10 mg by mouth every morning       LORazepam (ATIVAN) 1 MG tablet Take 1 tablet (1 mg) by mouth 2 times daily 30 tablet 0     losartan (COZAAR) 50 MG tablet Take 1 tablet (50 mg) by mouth daily 90 tablet 1     metoprolol (LOPRESSOR) 25 MG tablet Take 1 tablet (25 mg) by mouth 2 times daily 60 tablet 5     MetroNIDAZOLE (FLAGYL PO) Take 500 mg by mouth 2 times daily        Multiple Vitamins-Minerals (PRESERVISION AREDS PO) Take 1 tablet by mouth daily        nitroglycerin (NITROSTAT) 0.4 MG SL tablet Place 1 tablet (0.4 mg) under the tongue every 5 minutes as needed for chest pain 25 tablet 0     order for DME Equipment being ordered: Dispense face mask.  Mrs. Spicer is immunosuppressed due to rheumatoid arthritis. 1 Box 11     order for DME Equipment being ordered: Nebulizer. Use with Albuterol. 1 each 0     order for DME Equipment being ordered: Dispense baffle, for use with nebulizer. 1 each 0     order for DME Dispense SP  "Walker-small 1 each 0     ranibizumab (LUCENTIS) 0.3 MG/0.05ML SOLN 0.3 mg by Intravitreal route       ranitidine (ZANTAC) 150 MG tablet Take 1 tablet (150 mg) by mouth 2 times daily 60 tablet 5     saccharomyces boulardii (FLORASTOR) 250 MG capsule Take 250 mg by mouth daily       senna-docusate (SENOKOT-S;PERICOLACE) 8.6-50 MG per tablet Take 2 tablets by mouth At Bedtime Hold if diarrhea occurs. 120 tablet 11     triamcinolone (KENALOG) 0.1 % cream Apply sparingly to affected area on face three times daily. 453.6 g 5     Family History  family history includes Blood Disease in her daughter; DIABETES in her daughter and son; Hypertension in her mother; Psychotic Disorder in her father.  Social History  Social History   Substance Use Topics     Smoking status: Never Smoker     Smokeless tobacco: Never Used     Alcohol use Yes      Comment: rare wine        ROS  Constitutional: stable weight, low energy  Eyes: no vision change, diplopia or red eyes   Neck: no difficulty swallowing, no choking, no neck pain, no neck swelling  Cardiovascular: no chest pain, palpitations  Respiratory: + dyspnea, + shortness of breath due to pulmonary fibrosis  GI: no nausea, vomiting, diarrhea or constipation, no abdominal pain   : no change in urine, no dysuria or hematuria  Musculoskeletal: no joint or muscle pain or swelling   Integumentary: no concerning lesions   Neuro: no loss of strength or sensation, no numbness or tingling, no tremor, no dizziness, no headache   Endo: no polyuria or polydipsia, no temperature intolerance   Heme/Lymph: no concerning bumps, no bleeding problems   Allergy: no environmental allergies   Psych: no depression or anxiety, no sleep problems.    Physical Exam  /69  Pulse 64  Ht 1.626 m (5' 4.02\")  Wt 74.4 kg (164 lb 0.4 oz)  SpO2 97%  BMI 28.14 kg/m2  Body mass index is 28.14 kg/(m^2).  Constitutional: no distress, comfortable, pleasant   Eyes: anicteric, normal extra-ocular movements, no " lid lag or retraction  Neck: no thyromegaly, no discrete nodule  Cardiovascular: regular rate and rhythm, normal S1 and S2, no murmurs  Respiratory: +crakles at lower lungs field, normal breath sounds   Gastrointestinal:  nontender, no hepatomegaly, no masses   Musculoskeletal: no edema, mild kyphosis  Skin: no concerning lesions, no jaundice   Neurological: cranial nerves intact, 2+ reflexes at patella, normal gait, no tremor on outstretched hands bilaterally  Psychological: appropriate mood   Lymphatic: no cervical  lymphadenopathy.      RESULTS  DXA 7/26/2011  /      DXA 2/22/2013      DXA 5/20/2013  [place]  Conclusions:      a. Based on the most negative and valid T -score of - 3.2 at the   level of the L1 - L2 lumbar spine, this patient has osteoporosis.     DXA 6/29/2015  Lumbar spine T-score in region of L1-L4 = -2.2      HIPS:  Mean total hip T-score: -2.4     Left femoral neck T-score = -2.2  Right femoral neck T-score= 1.6      Radius 33% T-score = NA    DXA 6/23/2017  Lumbar spine T-score in region of L2, excluding L3 and L4 = -3.1   L1-4 percent change: -10.7%      HIPS:  Mean total hip T-score: -2.7  Mean total hip percent change:-4.1%      Left femoral neck T-score = -2.7  Right femoral neck T-score= -2.3      COMPARISON TO PRIOR:  Percent change in the spine and hips is significant accounting to the precision errors for this facility.       IMPRESSION:  Osteoporosis    ASSESSMENT:    Linda Spicer is a 86 year old female with hx of rheumatoid arthritis, RA associated pulmonary fibrosis, Afib s/p pacemaker, CKD state 3, CHF presents today for osteoporosis.    1) Osteoporosis: long standing secondary to RA, previous chronic use of steroid, thin body habitus and family hx of osteoporosis. She has been on long term use of oral bisphosphonates. Stopped for 8 months. Recent DXA in 6/2017 showed osteoporosis with worsening BMD at lumbar spine and hips. I would recommend to change to either Prolia or  Forteo given recent DXA result and CKD state 3. Will check with insurance coverage.  - continue calcium and vitamin D 1 pill tid  - continue with fall precaution and weight bearing exercise    PLAN:   Discussed switching to Prolia or Forteo  Will check with insurance  RTC 1 year    Tomi Peña MD     Division of Diabetes and Endocrinology  Department of Medicine  [phone]        Again, thank you for allowing me to participate in the care of your patient.        Sincerely,        Tomi Peña MD

## 2018-04-23 NOTE — NURSING NOTE
"Linda Spicer's goals for this visit include: Age related osteoporosis   She requests these members of her care team be copied on today's visit information: yes    PCP: Tre Lockett    Referring Provider:  Tre Lockett MD  [id number] [place] [address]    Chief Complaint   Patient presents with     Arthritis       Initial /69  Pulse 64  Ht 1.626 m (5' 4.02\")  Wt 74.4 kg (164 lb 0.4 oz)  SpO2 97%  BMI 28.14 kg/m2 Estimated body mass index is 28.14 kg/(m^2) as calculated from the following:    Height as of this encounter: 1.626 m (5' 4.02\").    Weight as of this encounter: 74.4 kg (164 lb 0.4 oz).  Medication Reconciliation: complete    "

## 2018-05-02 ENCOUNTER — INFUSION THERAPY VISIT (OUTPATIENT)
Dept: INFUSION THERAPY | Facility: CLINIC | Age: 83
End: 2018-05-02
Payer: MEDICARE

## 2018-05-02 VITALS
OXYGEN SATURATION: 97 % | WEIGHT: 163.9 LBS | DIASTOLIC BLOOD PRESSURE: 76 MMHG | BODY MASS INDEX: 28.12 KG/M2 | HEART RATE: 60 BPM | SYSTOLIC BLOOD PRESSURE: 139 MMHG | TEMPERATURE: 98 F

## 2018-05-02 DIAGNOSIS — N18.30 CKD (CHRONIC KIDNEY DISEASE) STAGE 3, GFR 30-59 ML/MIN (H): ICD-10-CM

## 2018-05-02 DIAGNOSIS — Z79.899 HIGH RISK MEDICATION USE: ICD-10-CM

## 2018-05-02 DIAGNOSIS — M81.0 AGE-RELATED OSTEOPOROSIS WITHOUT CURRENT PATHOLOGICAL FRACTURE: ICD-10-CM

## 2018-05-02 DIAGNOSIS — M05.79 RHEUMATOID ARTHRITIS INVOLVING MULTIPLE SITES WITH POSITIVE RHEUMATOID FACTOR (H): Primary | ICD-10-CM

## 2018-05-02 LAB
ALBUMIN SERPL-MCNC: 3.4 G/DL (ref 3.4–5)
ALP SERPL-CCNC: 45 U/L (ref 40–150)
ALT SERPL W P-5'-P-CCNC: 28 U/L (ref 0–50)
AST SERPL W P-5'-P-CCNC: NORMAL U/L (ref 0–45)
BASOPHILS # BLD AUTO: 0 10E9/L (ref 0–0.2)
BASOPHILS NFR BLD AUTO: 0.5 %
BILIRUB DIRECT SERPL-MCNC: <0.1 MG/DL (ref 0–0.2)
BILIRUB SERPL-MCNC: 0.5 MG/DL (ref 0.2–1.3)
CALCIUM SERPL-MCNC: 9.6 MG/DL (ref 8.5–10.1)
CREAT SERPL-MCNC: 1.36 MG/DL (ref 0.52–1.04)
CRP SERPL-MCNC: <2.9 MG/L (ref 0–8)
DIFFERENTIAL METHOD BLD: ABNORMAL
EOSINOPHIL # BLD AUTO: 0.1 10E9/L (ref 0–0.7)
EOSINOPHIL NFR BLD AUTO: 1.5 %
ERYTHROCYTE [DISTWIDTH] IN BLOOD BY AUTOMATED COUNT: 12.4 % (ref 10–15)
GFR SERPL CREATININE-BSD FRML MDRD: 37 ML/MIN/1.7M2
HCT VFR BLD AUTO: 34.4 % (ref 35–47)
HGB BLD-MCNC: 11.6 G/DL (ref 11.7–15.7)
IMM GRANULOCYTES # BLD: 0 10E9/L (ref 0–0.4)
IMM GRANULOCYTES NFR BLD: 0.3 %
LYMPHOCYTES # BLD AUTO: 1.5 10E9/L (ref 0.8–5.3)
LYMPHOCYTES NFR BLD AUTO: 22.8 %
MCH RBC QN AUTO: 33 PG (ref 26.5–33)
MCHC RBC AUTO-ENTMCNC: 33.7 G/DL (ref 31.5–36.5)
MCV RBC AUTO: 98 FL (ref 78–100)
MONOCYTES # BLD AUTO: 0.6 10E9/L (ref 0–1.3)
MONOCYTES NFR BLD AUTO: 9.9 %
NEUTROPHILS # BLD AUTO: 4.2 10E9/L (ref 1.6–8.3)
NEUTROPHILS NFR BLD AUTO: 65 %
PHOSPHATE SERPL-MCNC: 3.8 MG/DL (ref 2.5–4.5)
PLATELET # BLD AUTO: 257 10E9/L (ref 150–450)
PROT SERPL-MCNC: 7.6 G/DL (ref 6.8–8.8)
RBC # BLD AUTO: 3.52 10E12/L (ref 3.8–5.2)
WBC # BLD AUTO: 6.5 10E9/L (ref 4–11)

## 2018-05-02 PROCEDURE — 96365 THER/PROPH/DIAG IV INF INIT: CPT | Performed by: INTERNAL MEDICINE

## 2018-05-02 PROCEDURE — 82310 ASSAY OF CALCIUM: CPT | Performed by: INTERNAL MEDICINE

## 2018-05-02 PROCEDURE — 82565 ASSAY OF CREATININE: CPT | Performed by: INTERNAL MEDICINE

## 2018-05-02 PROCEDURE — 99207 ZZC NO CHARGE LOS: CPT

## 2018-05-02 PROCEDURE — 84100 ASSAY OF PHOSPHORUS: CPT | Performed by: INTERNAL MEDICINE

## 2018-05-02 PROCEDURE — 86140 C-REACTIVE PROTEIN: CPT | Performed by: INTERNAL MEDICINE

## 2018-05-02 PROCEDURE — 85025 COMPLETE CBC W/AUTO DIFF WBC: CPT | Performed by: INTERNAL MEDICINE

## 2018-05-02 PROCEDURE — 96372 THER/PROPH/DIAG INJ SC/IM: CPT | Mod: 59 | Performed by: INTERNAL MEDICINE

## 2018-05-02 PROCEDURE — 80076 HEPATIC FUNCTION PANEL: CPT | Performed by: INTERNAL MEDICINE

## 2018-05-02 RX ADMIN — Medication 250 ML: at 10:55

## 2018-05-02 ASSESSMENT — PAIN SCALES - GENERAL: PAINLEVEL: MILD PAIN (2)

## 2018-05-02 NOTE — MR AVS SNAPSHOT
After Visit Summary   5/2/2018    Linda Spicer    MRN: [id number]           Patient Information     Date Of Birth          6/13/1931        Visit Information        Provider Department      5/2/2018 10:00 AM [address]        Today's Diagnoses     Rheumatoid arthritis involving multiple sites with positive rheumatoid factor (H)    -  1    Age-related osteoporosis without current pathological fracture        CKD (chronic kidney disease) stage 3, GFR 30-59 ml/min        High risk medication use           Follow-ups after your visit        Your next 10 appointments already scheduled     May 08, 2018 10:20 AM CDT   Return Visit with Mario Davenport MD   [facility] ([facility])    [address] [place] [address]   [phone]            May 30, 2018 10:00 AM CDT   Level 2 with [address] ([facility])    [address] [place] [address]   [phone]            Jun 01, 2018 10:30 AM CDT   Return Visit with Asia Steven PA-C   [facility] ([facility])    [id number] [place] [address]   [phone]            Jun 27, 2018  9:15 AM CDT   Level 2 with [address] ([facility])    [address] [place] [address]   [phone]            Aug 20, 2018 12:30 PM CDT   Return Visit with Emeterio Loza MD   [facility] ([facility])    [address] [place] [address]   [phone]            Sep 05, 2018 10:00 AM CDT   Return Visit with DEVICE CHECK RN CARD   [facility] ([facility])    [address] [place] [address]   [phone]            Jim 15, 2019  9:30 AM CST   Return Visit with Sandra Comer MD   [facility]  [facility] ([facility])    [address] [place] [address] [phone]              Who to contact     If you have questions or need follow up information about today's clinic visit or your schedule please contact [facility] directly at [phone].  Normal or non-critical lab and imaging results will be communicated to you by MyChart, letter or phone within 4 business days after the clinic has received the results. If you do not hear from us within 7 days, please contact the clinic through [employer] or phone. If you have a critical or abnormal lab result, we will notify you by phone as soon as possible.  Submit refill requests through [employer] or call your pharmacy and they will forward the refill request to us. Please allow 3 business days for your refill to be completed.          Additional Information About Your Visit        [employer] Information     [employer] gives you secure access to your electronic health record. If you see a primary care provider, you can also send messages to your care team and make appointments. If you have questions, please call your primary care clinic.  If you do not have a primary care provider, please call [phone] and they will assist you.      [employer] is an electronic gateway that provides easy, online access to your medical records. With [employer], you can request a clinic appointment, read your test results, renew a prescription or communicate with your care team.     To access your existing account, please contact your [facility] or call [phone] for assistance.        Care EveryWhere ID     This is your Care EveryWhere ID. This could be used by other organizations to access your [facility] medical records  [id number]        Your Vitals Were     Pulse Temperature Pulse Oximetry BMI (Body Mass Index)          60 98  F (36.7  C) (Oral) 97% 28.12 kg/m2         Blood Pressure from Last 3  Encounters:   05/02/18 139/76   04/23/18 140/69   04/04/18 130/56    Weight from Last 3 Encounters:   05/02/18 74.3 kg (163 lb 14.4 oz)   04/23/18 74.4 kg (164 lb 0.4 oz)   04/04/18 76.7 kg (169 lb)              We Performed the Following     Calcium     CBC with platelets differential     Creatinine     CRP inflammation     Hepatic panel     Phosphorus        Primary Care Provider Office Phone # Fax #    Tre Lockett [phone]       [address]  [facility] [address]        Goals        General    I will complete my health care directive. (pt-stated)     Notes - Note created  3/29/2018  3:07 PM by Behl, Melissa K, RN    Supporting Steps:    Pt has attended  a health care  directive clas-  s, is awaiting  to see her law-  elizabeth to finalize  the document.  10/24/17 Pt  informed of CPR  vs intubation  and information  mailed out to  patient.           I will schedule an appointment with endocrinology. (pt-stated)       Equal Access to Services     [facility]: Hadii rakesh schuster hadasho Soomaali, waaxda luqadaha, qaybta kaalmada adeegyada, zahraa moss . So [facility] [phone].    ATENCIÓN: Si habla español, tiene a merchant disposición servicios gratuitos de asistencia lingüística. Llame al [phone].    We comply with applicable federal civil rights laws and [place] laws. We do not discriminate on the basis of race, color, national origin, age, disability, sex, sexual orientation, or gender identity.            Thank you!     Thank you for choosing [facility]  for your care. Our goal is always to provide you with excellent care. Hearing back from our patients is one way we can continue to improve our services. Please take a few minutes to complete the written survey that you may receive in the mail after your visit with us. Thank you!             Your Updated Medication List - Protect others around you: Learn how to safely use, store and throw  away your medicines at www.disposemymeds.org.          This list is accurate as of 5/2/18  5:06 PM.  Always use your most recent med list.                   Brand Name Dispense Instructions for use Diagnosis    apixaban ANTICOAGULANT 2.5 MG tablet    ELIQUIS    180 tablet    Take 1 tablet (2.5 mg) by mouth 2 times daily    Atrial flutter, paroxysmal (H)       azaTHIOprine 50 MG tablet    IMURAN    90 tablet    Take 1 tablet (50 mg) by mouth daily    Rheumatoid arthritis involving multiple sites with positive rheumatoid factor (H)       bismuth subsalicylate 262 MG chewable tablet    PEPTO BISMOL    100 tablet    Take 2 tablets (524 mg) by mouth 4 times daily as needed    Dyspepsia and disorder of function of stomach       Blood Pressure Monitor Kit     1 kit    1 kit daily as needed    CHF (congestive heart failure) (H)       bumetanide 1 MG tablet    BUMEX    30 tablet    Take 1 tablet (1 mg) by mouth every morning    Chronic diastolic congestive heart failure (H)       calcium-vitamin D 600-400 MG-UNIT per tablet    CALTRATE     Take 1 tablet by mouth 2 times daily        COMPOUNDED NON-CONTROLLED SUBSTANCE - PHARMACY TO MIX COMPOUNDED MEDICATION    CMPD RX    30 g    Estriol 1mg/gram. Place 1 gram vaginally daily for two weeks, then vaginally twice weekly after.    Atrophic vaginitis       Cranberry 180 MG Caps      Take 1 capsule by mouth daily Unsure of strength        fish oil-omega-3 fatty acids 1000 MG capsule      Take 2 g by mouth daily. 2 capsules daily        FLAGYL PO      Take 500 mg by mouth 2 times daily    Acute infectious diarrhea       folic acid 1 MG tablet    FOLVITE    90 tablet    Take 1 tablet (1 mg) by mouth daily    Oral aphthae       GENTEAL MILD OP      Apply  to eye daily.        hydrocortisone 2.5 % cream     30 g    Apply BID to affected region(s) for 7-10 days.    Rash       levothyroxine 75 MCG tablet    SYNTHROID/LEVOTHROID    90 tablet    TAKE ONE TABLET BY MOUTH EVERY DAY     Hypothyroidism, unspecified type       loratadine 10 MG tablet    CLARITIN     Take 10 mg by mouth every morning        LORazepam 1 MG tablet    ATIVAN    30 tablet    Take 1 tablet (1 mg) by mouth 2 times daily    Anxiety       losartan 50 MG tablet    COZAAR    90 tablet    Take 1 tablet (50 mg) by mouth daily    Heart failure with preserved ejection fraction, NYHA class I (H)       metoprolol tartrate 25 MG tablet    LOPRESSOR    60 tablet    Take 1 tablet (25 mg) by mouth 2 times daily    Atrial fibrillation, unspecified type (H), Hypertension goal BP (blood pressure) < 150/90       nitroGLYcerin 0.4 MG sublingual tablet    NITROSTAT    25 tablet    Place 1 tablet (0.4 mg) under the tongue every 5 minutes as needed for chest pain    Chest pain       * order for DME     1 Box    Equipment being ordered: Dispense face mask. Mrs. Spicer is immunosuppressed due to rheumatoid arthritis.    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 Use with Albuterol.    Hypoxia       order for DME     1 each    Equipment being ordered: Dispense baffle, for use with nebulizer.    Pneumonia of left upper lobe due to Mycoplasma pneumoniae       * order for DME     1 each    Dispense SP Walker-small    Pain of toe of right foot, Status post bunionectomy       PRESERVISION AREDS PO      Take 1 tablet by mouth daily        ranibizumab 0.3 MG/0.05ML Soln    LUCENTIS     0.3 mg by Intravitreal route        ranitidine 150 MG tablet    ZANTAC    60 tablet    Take 1 tablet (150 mg) by mouth 2 times daily    Gastroesophageal reflux disease without esophagitis       REFRESH P.M. Oint      Apply  to eye. Daily at bedtime        saccharomyces boulardii 250 MG capsule    FLORASTOR     Take 250 mg by mouth daily        senna-docusate 8.6-50 MG per tablet    SENOKOT-S;PERICOLACE    120 tablet    Take 2 tablets by mouth At Bedtime Hold if diarrhea  occurs.    Constipation, chronic       triamcinolone 0.1 % cream    KENALOG    453.6 g    Apply sparingly to affected area on face three times daily.    Facial lesion       VITAMIN C PO           ZYRTEC ALLERGY 10 MG tablet   Generic drug:  cetirizine      Take 10 mg by mouth At Bedtime        * Notice:  This list has 3 medication(s) that are the same as other medications prescribed for you. Read the directions carefully, and ask your doctor or other care provider to review them with you.

## 2018-05-02 NOTE — PROGRESS NOTES
"Infusion Nursing Note:  Linda M Esteban presents today for Orencia/Prolia.    Patient seen by provider today: No   present during visit today: Not Applicable.    Note: N/A.    Intravenous Access:  Peripheral IV placed.    Treatment Conditions:  Rheumatology Infusion Checklist: PRIOR TO INFUSION OF BIOLOGICAL MEDICATIONS OR ANY OF THESE AS LISTED: Orencia (abatacept) \".rheumbiologicalchecklist\"    Prior to Infusion of biological medications or any of these as listed:    1. Elevated temperature, fever, chills, productive cough or abnormal vital signs, night sweats, coughing up blood or sputum, no appetite or abnormal vital signs : NO    2. Open wounds or new incisions: NO    3. Recent hospitalization: NO    4.  Recent surgeries:  NO    5. Any upcoming surgeries or dental procedures?:NO    6. Any current or recent bouts of illness or infection? On any antibiotics? : NO    7. Any new, sudden or worsening abdominal pain :NO    8. Vaccination within 4 weeks? Patient or someone in the household is scheduled to receive vaccination? No live virus vaccines prior to or during treatment :NO    9. Any nervous system diseases [i.e. multiple sclerosis, Guillain-Quincy, seizures, neurological  changes]: NO    10. Pregnant or breast feeding; or plans on pregnancy in the future: NO    11. Signs of worsening depression or suicidal ideations while taking benlysta:NO    12. New-onset medical symptoms: NO    13.  New cancer diagnosis or on chemotherapy or radiation NO    14.  Evaluate for any sign of active TB [Unexplained weight loss, Loss of appetite, Night sweats, Fever, Fatigue, Chills, Coughing for 3 weeks or longer, Hemoptysis (coughing up blood), Chest pain]: NO    **Note: If answered yes to any of the above, hold the infusion and contact ordering rheumatologist or on-call rheumatologist.     Post Infusion Assessment:  Patient tolerated infusion without incident.  Blood return noted pre and post infusion.  Site patent " and intact, free from redness, edema or discomfort.  Access discontinued per protocol.    Discharge Plan:   Patient discharged in stable condition accompanied by: son.  Departure Mode: Ambulatory.    Trinity Villalpando RN

## 2018-05-08 ENCOUNTER — OFFICE VISIT (OUTPATIENT)
Dept: RHEUMATOLOGY | Facility: CLINIC | Age: 83
End: 2018-05-08
Payer: MEDICARE

## 2018-05-08 VITALS
WEIGHT: 163.8 LBS | HEIGHT: 64 IN | DIASTOLIC BLOOD PRESSURE: 72 MMHG | SYSTOLIC BLOOD PRESSURE: 144 MMHG | RESPIRATION RATE: 16 BRPM | OXYGEN SATURATION: 93 % | TEMPERATURE: 98.2 F | HEART RATE: 62 BPM | BODY MASS INDEX: 27.96 KG/M2

## 2018-05-08 DIAGNOSIS — Z79.899 HIGH RISK MEDICATIONS (NOT ANTICOAGULANTS) LONG-TERM USE: ICD-10-CM

## 2018-05-08 DIAGNOSIS — M05.79 RHEUMATOID ARTHRITIS INVOLVING MULTIPLE SITES WITH POSITIVE RHEUMATOID FACTOR (H): Primary | ICD-10-CM

## 2018-05-08 PROCEDURE — 99213 OFFICE O/P EST LOW 20 MIN: CPT | Performed by: INTERNAL MEDICINE

## 2018-05-08 RX ORDER — AZATHIOPRINE 50 MG/1
50 TABLET ORAL DAILY
Qty: 90 TABLET | Refills: 2 | Status: ON HOLD | OUTPATIENT
Start: 2018-05-08 | End: 2018-12-04

## 2018-05-08 NOTE — PATIENT INSTRUCTIONS
Rheumatology    Dr. Mario Davenport         [facility]   (Monday)  [address]  [place] [address]       [facility]   (Tuesday)  [address]  [place] [address]    [facility]   (Wed., Thurs., and Friday)  [address] [place] [address]    Phone number: [phone]  Thank you for choosing [facility].  Nicole Will CMA

## 2018-05-08 NOTE — NURSING NOTE
"Chief Complaint   Patient presents with     Arthritis     RA, patient states she is doing good, did have hand pain when tempeture changed but doing much better       Initial /83  Pulse 62  Temp 98.2  F (36.8  C) (Oral)  Resp 16  Ht 1.626 m (5' 4.02\")  Wt 74.3 kg (163 lb 12.8 oz)  SpO2 93%  BMI 28.1 kg/m2 Estimated body mass index is 28.1 kg/(m^2) as calculated from the following:    Height as of this encounter: 1.626 m (5' 4.02\").    Weight as of this encounter: 74.3 kg (163 lb 12.8 oz).  BP completed using cuff size: regular         RAPID3 (0-30) Cumulative Score  8.3          RAPID3 Weighted Score (divide #4 by 3 and that is the weighted score)  2.77         "

## 2018-05-08 NOTE — PROGRESS NOTES
"Rheumatology Clinic Visit      Linda Spicer MRN# [id number]   YOB: 1931 Age: 86 year old      Date of visit: 5/08/18   PCP: Dr. Tre Lockett  Pulmonology: Dr. Sandra Comer  Cardiology: Dr. Emeterio Loza  Dermatology: Dr. Andrews Knott at [facility] in [place]     Chief Complaint   Patient presents with:  Arthritis: RA, patient states she is doing good, did have hand pain when tempeture changed but doing much better    Assessment and Plan     1. Seropositive Nonerosive Rheumatoid Arthritis (RF 99, CCP 52): Previously treated with hydroxychloroquine 200 mg daily (stopped previously because of macular degeneration), methotrexate (leg cramps), Cimzia (stopped due to recurrent basal cell carcinoma and hx of heart failure). Has sulfa allergy. Leflunomide avoided because of ILD.  Currently on Orencia IV and azathioprine 50mg daily. Doing well today and therefore will maintain the current medication regimen.  TPMT 23.2 (normal 24-44) and considered \"intermediate activity\".  - Continue orencia 750mg IV every 4 weeks, administered  at the [facility]  - Continue azathioprine 50mg daily  - Labs every 3 months to be done with her Orencia infusions (every third infusion): CBC, Creatinine, Hepatic Panel    2. Bilateral shoulder impingement syndrome, history: Maintaining ROM with exercises at home. She was previously referred to PT but did not go. Not an issue today.     3. History of basal cell carcinoma: Reportedly with lesions removed in 2007, fall 2016 and fall 2017.    4. Heart failure: She is followed by cardiology.  Has a pacemaker, per patient.     5. 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  2013 chest CT with contrast performed at [facility] documents UIP pattern. Follows with Dr. Comer. 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      6. Bone Health: Managed by PCP / " endocrinology.     Ms. Spicer verbalized agreement with and understanding of the rational for the diagnosis and treatment plan.  All questions were answered to best of my ability and the patient's satisfaction. Ms. Spicer was advised to contact the clinic with any questions that may arise after the clinic visit.      Thank you for involving me in the care of the patient    Return to clinic: 4 months      HPI   Linda Spicer is a 86 year old female with a medical history significant for hypertension, heart failure, pulmonary fibrosis, audible bowel syndrome, degenerative disc disease of the lumbar spine status post laminectomy, chronic kidney disease, history of basal cell carcinoma, and rheumatoid arthritis who presents for follow-up of rheumatoid arthritis.    Today, she reports that she is doing well.  She feels like this indication regiment works great for her.  Not having infections.  No morning stiffness.  No gelling phenomenon.  Tolerating medications well.    Denies fevers, chills, nausea, vomiting. No abdominal pain. No chest pain/pressure, palpitations.  Chronic SOB. No oral or nasal sores. No neck pain.   No photosensitivity or photophobia.   No eye pain or redness.     Tobacco: None  EtOH: rare  Drugs: none  Used to live in [place], [place] part time.    ROS   GEN: No fevers, chills, night sweats, or weight change  SKIN: See HPI  HEENT: No epistaxis. No oral or nasal ulcers.  CV: No chest pain, pressure.  Monitoring her weight closely  PULM: No wheeze, cough.  GI: No nausea, vomiting. No blood in stool. No abdominal pain.  : No blood in urine.  MSK: See HPI.  EXT: No LE swelling  PSYCH: Negative    Active Problem List     Patient Active Problem List   Diagnosis     ACP (advance care planning)     Hypertension goal BP (blood pressure) < 150/90     Hypothyroidism     Macular degeneration, left eye     Irritable bowel syndrome     Encounter for palliative care     Adjustment disorder with anxious mood     Mild  anemia     DDD (degenerative disc disease), lumbar     CKD (chronic kidney disease) stage 3, GFR 30-59 ml/min     History of blood transfusion     Aftercare following surgery     S/P lumbar laminectomy     High risk medication use     Age-related osteoporosis without current pathological fracture     Atrophic vaginitis     Fecal incontinence     Female stress incontinence     Impingement syndrome of both shoulders     UIP (usual interstitial pneumonitis) (H)     High risk medications (not anticoagulants) long-term use     Heart failure with preserved ejection fraction (H)     Congestive heart failure with preserved LV function, NYHA class 3 (H)     Other specified hypothyroidism     Rheumatoid arthritis involving multiple sites with positive rheumatoid factor (H)     Health Care Home     Sick sinus syndrome (H)     Cardiac pacemaker - Medtronic dual lead pacemaker - Not Dependent - MRI Safe     Immunosuppression (H)     ILD (interstitial lung disease) (H)     Heart failure with preserved ejection fraction, NYHA class I (H)     Past Medical History     Past Medical History:   Diagnosis Date     Adjustment disorder with anxious mood 5/18/2015     Advanced directives, counseling/discussion 8/30/2012    Patient states has Advance Directive and will bring in a copy to clinic. 8/30/2012   Tevin May  [facility] Medical Assistant \       Anemia 9/25/2015     Basal cell cancer      CHF (congestive heart failure) (H) 9/18/2014     CKD (chronic kidney disease) stage 3, GFR 30-59 ml/min 9/29/2015     DDD (degenerative disc disease), lumbar 9/25/2015     Diffuse idiopathic pulmonary fibrosis (H) 5/6/2013     Encounter for palliative care 5/18/2015     History of blood transfusion 9/29/2015     Hypertension goal BP (blood pressure) < 140/90 9/7/2012     Hypothyroid 9/7/2012     Irritable bowel syndrome 10/29/2013     Macular degeneration      Macular degeneration, 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     Nondisplaced spiral fracture of shaft of  humerus      Osteoporosis 8/13/2013     Imo Update utility     RA (rheumatoid arthritis) (H) 5/7/2013     Rheumatic fever      Shingles      Spinal stenosis of lumbar region with neurogenic claudication 9/14/2015     Past Surgical History     Past Surgical History:   Procedure Laterality Date     APPENDECTOMY       BIOPSY      hemorrhoidectomy     ENT SURGERY      tonsillectomy     GYN SURGERY      3 D & C's     HYSTERECTOMY, PAP NO LONGER INDICATED       LAMINECTOMY LUMBAR ONE LEVEL N/A 10/13/2015    Procedure: LAMINECTOMY LUMBAR ONE LEVEL;  Surgeon: Fransico Toussaint MD;  Location: [facility] OR     Allergy     Allergies   Allergen Reactions     Cephalexin Hcl Diarrhea     Gabapentin Other (See Comments)     Dizzsiness     Naproxen GI Disturbance     Perfume      Lactase Other (See Comments)     Macrobid [Nitrofurantoin Anhydrous]      Possibly related to lung disease      Sulfa Drugs      Throat swelling     Xanax [Alprazolam] Other (See Comments)     Dizziness      Current Medication List     Current Outpatient Prescriptions   Medication Sig     apixaban ANTICOAGULANT (ELIQUIS) 2.5 MG tablet Take 1 tablet (2.5 mg) by mouth 2 times daily     Artificial Tear Ointment (REFRESH P.M.) OINT Apply  to eye. Daily at bedtime        Ascorbic Acid (VITAMIN C PO)      azaTHIOprine (IMURAN) 50 MG tablet Take 1 tablet (50 mg) by mouth daily     bismuth subsalicylate (PEPTO BISMOL) 262 MG chewable tablet Take 2 tablets (524 mg) by mouth 4 times daily as needed     Blood Pressure Monitor KIT 1 kit daily as needed     bumetanide (BUMEX) 1 MG tablet Take 1 tablet (1 mg) by mouth every morning     calcium-vitamin D (CALTRATE) 600-400 MG-UNIT per tablet Take 1 tablet by mouth 2 times daily      cetirizine (ZYRTEC ALLERGY) 10 MG tablet Take 10 mg by mouth At Bedtime     COMPOUNDED NON-CONTROLLED SUBSTANCE (CMPD RX) - PHARMACY TO MIX COMPOUNDED MEDICATION Estriol 1mg/gram. Place 1 gram vaginally daily for two weeks, then vaginally twice  weekly after.     Cranberry 180 MG CAPS Take 1 capsule by mouth daily Unsure of strength     fish oil-omega-3 fatty acids (FISH OIL) 1000 MG capsule Take 2 g by mouth daily. 2 capsules daily          folic acid (FOLVITE) 1 MG tablet Take 1 tablet (1 mg) by mouth daily     hydrocortisone 2.5 % cream Apply BID to affected region(s) for 7-10 days.     Hypromellose (GENTEAL MILD OP) Apply  to eye daily.     levothyroxine (SYNTHROID/LEVOTHROID) 75 MCG tablet TAKE ONE TABLET BY MOUTH EVERY DAY     loratadine (CLARITIN) 10 MG tablet Take 10 mg by mouth every morning     LORazepam (ATIVAN) 1 MG tablet Take 1 tablet (1 mg) by mouth 2 times daily     losartan (COZAAR) 50 MG tablet Take 1 tablet (50 mg) by mouth daily     metoprolol (LOPRESSOR) 25 MG tablet Take 1 tablet (25 mg) by mouth 2 times daily     MetroNIDAZOLE (FLAGYL PO) Take 500 mg by mouth 2 times daily      Multiple Vitamins-Minerals (PRESERVISION AREDS PO) Take 1 tablet by mouth daily      nitroglycerin (NITROSTAT) 0.4 MG SL tablet Place 1 tablet (0.4 mg) under the tongue every 5 minutes as needed for chest pain     order for DME Equipment being ordered: Dispense face mask.  Mrs. Spicer is immunosuppressed due to rheumatoid arthritis.     order for DME Equipment being ordered: Nebulizer. Use with Albuterol.     order for DME Equipment being ordered: Dispense baffle, for use with nebulizer.     order for DME Dispense SP Walker-small     ranibizumab (LUCENTIS) 0.3 MG/0.05ML SOLN 0.3 mg by Intravitreal route     ranitidine (ZANTAC) 150 MG tablet Take 1 tablet (150 mg) by mouth 2 times daily     saccharomyces boulardii (FLORASTOR) 250 MG capsule Take 250 mg by mouth daily     senna-docusate (SENOKOT-S;PERICOLACE) 8.6-50 MG per tablet Take 2 tablets by mouth At Bedtime Hold if diarrhea occurs.     triamcinolone (KENALOG) 0.1 % cream Apply sparingly to affected area on face three times daily.     No current facility-administered medications for this visit.   "    Facility-Administered Medications Ordered in Other Visits   Medication     DOBUTamine 500 mg in dextrose 5% 250 mL (adult std)     DOBUTamine 500 mg in dextrose 5% 250 mL (adult std)       Social History   See HPI    Family History     Family History   Problem Relation Age of Onset     Hypertension Mother      Psychotic Disorder Father      DIABETES Son      DIABETES Daughter      Blood Disease Daughter      Physical Exam     Temp Readings from Last 3 Encounters:   05/02/18 98  F (36.7  C) (Oral)   04/04/18 98.3  F (36.8  C) (Oral)   03/07/18 98.1  F (36.7  C) (Oral)     BP Readings from Last 5 Encounters:   05/02/18 139/76   04/23/18 140/69   04/04/18 130/56   03/07/18 124/68   03/02/18 129/60     Pulse Readings from Last 1 Encounters:   05/02/18 60     Resp Readings from Last 1 Encounters:   04/04/18 16     Estimated body mass index is 28.12 kg/(m^2) as calculated from the following:    Height as of 4/23/18: 1.626 m (5' 4.02\").    Weight as of 5/2/18: 74.3 kg (163 lb 14.4 oz).    GEN: NAD  HEENT: MMM. No oral lesions. Anicteric, noninjected sclera  CV: S1, S2. RRR. No m/r/g.  PULM: CTA bilaterally. No w/c.   MSK: Hands, wrists, elbows, and shoulders without swelling or tenderness to palpation. Hips nontender to palpation. Bilateral knees with mild medial joint line tenderness but no effusion or increased warmth; right knee also tender to palpation over the pes anserine bursa.  Ankles and MTPs without swelling or tenderness to palpation.  Negative MCP and MTP squeeze bilaterally.   NEURO: UE and LE strengths 5/5 and equal bilaterally.   SKIN: No rash  PSYCH: Alert. Appropriate.    Labs / Imaging (select studies)   RF/CCP  Recent Labs   Lab Test  04/05/16   1036   CCPIGG  52*   RHF  99*     CBC  Recent Labs   Lab Test  05/02/18   1050  02/07/18   1008  11/27/17   1319   WBC  6.5  6.3  7.3   RBC  3.52*  3.62*  3.56*   HGB  11.6*  11.6*  11.8   HCT  34.4*  35.5  35.9   MCV  98  98  101*   RDW  12.4  12.5  12.5 "   PLT  257  256  271   MCH  33.0  32.0  33.1*   MCHC  33.7  32.7  32.9   NEUTROPHIL  65.0  61.2  59.6   LYMPH  22.8  25.4  28.1   MONOCYTE  9.9  10.7  9.9   EOSINOPHIL  1.5  2.2  2.1   BASOPHIL  0.5  0.3  0.3   ANEU  4.2  3.9  4.4   ALYM  1.5  1.6  2.1   YEHUDA  0.6  0.7  0.7   AEOS  0.1  0.1  0.2   ABAS  0.0  0.0  0.0     CMP  Recent Labs   Lab Test  05/02/18   1050  02/07/18   1008  01/22/18   1018 01/15/18   11/25/17   1007   11/01/17   0831   NA   --    --   139   --    --   138   --   139   POTASSIUM   --    --   4.3  4.8   --   5.0   --   4.3   CHLORIDE   --    --   103   --    --   99   --   105   CO2   --    --   27   --    --   32   --   31   ANIONGAP   --    --   9   --    --   7   --   3   GLC   --    --   91  85   --   86   --   97   BUN   --    --   31*   --    --   33*   --   42*   CR  1.36*  1.43*  1.26*  1.59*   < >  1.60*   < >  1.41*   GFRESTIMATED  37*  35*  40*  31*   < >  31*   < >  35*   GFRESTBLACK  45*  42*  49*  37*   < >  37*   < >  43*   FATOUMATA  9.6   --   9.3   --    --   9.8   --   9.8   BILITOTAL  0.5  0.5   --    --    --   0.6   < >  0.5   ALBUMIN  3.4  3.5   --    --    --   3.3*   < >  3.6   PROTTOTAL  7.6  7.4   --    --    --   6.9   < >  7.4   ALKPHOS  45  91   --    --    --   58   < >  65   AST  Unsatisfactory specimen - hemolyzed  30   --    --    --   40   < >  25   ALT  28  35   --    --    --   32   < >  33    < > = values in this interval not displayed.     Calcium/VitaminD  Recent Labs   Lab Test  05/02/18   1050  01/22/18   1018  11/25/17   1007   FATOUMATA  9.6  9.3  9.8     ESR/CRP  Recent Labs   Lab Test  05/02/18   1050  02/07/18   1008  11/10/17   0925  08/18/17   0934   SED   --   34*  23  18   CRP  <2.9  3.7  <2.9  <2.9     Hepatitis B  Recent Labs   Lab Test  04/05/16   1036   HBCAB  Nonreactive   HEPBANG  Nonreactive     Hepatitis C  Recent Labs   Lab Test  04/05/16   1036   HCVAB  Nonreactive   Assay performance characteristics have not been established for newborns,    infants, and children       Tuberculosis Screening  Recent Labs   Lab Test  02/21/17   1148  04/05/16   1037   TBRSLT  Negative  Negative   TBAGN  0.19  0.03     HIV Screening  Recent Labs   Lab Test  04/05/16   1036   HIAGAB  Nonreactive   HIV-1 p24 Ag & HIV-1/HIV-2 Ab Not Detected       Immunization History     Immunization History   Administered Date(s) Administered     Influenza (High Dose) 3 valent vaccine 09/07/2012, 10/08/2013, 09/26/2014, 10/09/2015, 09/21/2016, 10/13/2017     Influenza (IIV3) PF 08/29/2011     Pneumo Conj 13-V (2010&after) 04/05/2016     Pneumococcal 23 valent 05/01/2001, 10/04/2010     TD (ADULT, 7+) 07/01/2008          Chart documentation done in part with Dragon Voice recognition Software. Although reviewed after completion, some word and grammatical error may remain.    Mario Davenport MD

## 2018-05-08 NOTE — NURSING NOTE
Blood pressure rechecked after visit      144/72  Nicole Will CMA  5/8/2018 11:39 AM

## 2018-05-08 NOTE — MR AVS SNAPSHOT
After Visit Summary   5/8/2018    Linda Spicer    MRN: [id number]           Patient Information     Date Of Birth          6/13/1931        Visit Information        Provider Department      5/8/2018 10:20 AM Mario Davenport MD [facility]        Today's Diagnoses     Rheumatoid arthritis involving multiple sites with positive rheumatoid factor (H)          Care Instructions    Rheumatology    Dr. Mario Patrick [facility]   (Monday)  [address]  [place] [address]       [facility]   (Tuesday)  [address] [place] [address]    [facility]   (Wed., Thurs., and Friday)  [address] [facility] [place] [address]    Phone number: [phone]  Thank you for choosing [facility].  Nicole Will CMA            Follow-ups after your visit        Your next 10 appointments already scheduled     May 30, 2018 10:00 AM CDT   Level 2 with [place] 9 INFUSION   [facility] ([facility])    [address] [place] [address]   [phone]            Jun 01, 2018 10:30 AM CDT   Return Visit with Asia Steven PA-C   [facility])    [address] [facility] [address]   [phone]            Jun 27, 2018  9:15 AM CDT   Level 2 with [place] 7 INFUSION   [facility] ([facility])    [address] [facility] [address]   [phone]            Aug 20, 2018 12:30 PM CDT   Return Visit with Emeterio Loza MD   [facility])    [address] [facility] [address]   [phone]            Sep 05, 2018 10:00 AM CDT   Return Visit with DEVICE CHECK RN CARD   [facility])    [address] [facility] [address]   [phone]            Sep 11, 2018 10:00 AM CDT   Return Visit with Mario Davenport MD  "  [facility] ([facility])    [address] [facility] [id number] [phone]            Jim 15, 2019  9:30 AM CST   Return Visit with Sandra Comer MD   [facility])    [address] [place] [id number] [phone]              Who to contact     If you have questions or need follow up information about today's clinic visit or your schedule please contact [facility] directly at [phone].  Normal or non-critical lab and imaging results will be communicated to you by MyChart, letter or phone within 4 business days after the clinic has received the results. If you do not hear from us within 7 days, please contact the clinic through [employer] or phone. If you have a critical or abnormal lab result, we will notify you by phone as soon as possible.  Submit refill requests through [employer] or call your pharmacy and they will forward the refill request to us. Please allow 3 business days for your refill to be completed.          Additional Information About Your Visit        [employer] Information     [employer] gives you secure access to your electronic health record. If you see a primary care provider, you can also send messages to your care team and make appointments. If you have questions, please call your primary care clinic.  If you do not have a primary care provider, please call [phone] and they will assist you.        Care EveryWhere ID     This is your Care EveryWhere ID. This could be used by other organizations to access your [place] medical records  [id number]        Your Vitals Were     Pulse Temperature Respirations Height Pulse Oximetry BMI (Body Mass Index)    62 98.2  F (36.8  C) (Oral) 16 1.626 m (5' 4.02\") 93% 28.1 kg/m2       Blood Pressure from Last 3 Encounters:   05/08/18 151/83   05/02/18 139/76   04/23/18 140/69    Weight from Last 3 Encounters: "   05/08/18 74.3 kg (163 lb 12.8 oz)   05/02/18 74.3 kg (163 lb 14.4 oz)   04/23/18 74.4 kg (164 lb 0.4 oz)              Today, you had the following     No orders found for display         Where to get your medicines      These medications were sent to [facility], [place] - [address] [facility]  [address] [facility] [address]     Phone:  [phone]     azaTHIOprine 50 MG tablet          Primary Care Provider Office Phone # Fax #    Tre Jenny Lockett [phone]       [address]  [facility] [address]        Goals        General    I will complete my health care directive. (pt-stated)     Notes - Note created  3/29/2018  3:07 PM by Behl, Melissa K, RN    Supporting Steps:    Pt has attended  a health care  directive clas-  s, is awaiting  to see her law-  elizabeth to finalize  the document.  10/24/17 Pt  informed of CPR  vs intubation  and information  mailed out to  patient.           I will schedule an appointment with endocrinology. (pt-stated)       Equal Access to Services     [facility]: Hadii aad ku hadasho Soomaali, waaxda luqadaha, qaybta kaalmada adeegyada, zahraa moss . So [facility] [phone].    ATENCIÓN: Si habla español, tiene a merchant disposición servicios gratuitos de asistencia lingüística. Llame al [phone].    We comply with applicable federal civil rights laws and [place] laws. We do not discriminate on the basis of race, color, national origin, age, disability, sex, sexual orientation, or gender identity.            Thank you!     Thank you for choosing [facility]  for your care. Our goal is always to provide you with excellent care. Hearing back from our patients is one way we can continue to improve our services. Please take a few minutes to complete the written survey that you may receive in the mail after your visit with us. Thank you!             Your Updated Medication List -  Protect others around you: Learn how to safely use, store and throw away your medicines at www.disposemymeds.org.          This list is accurate as of 5/8/18 10:57 AM.  Always use your most recent med list.                   Brand Name Dispense Instructions for use Diagnosis    apixaban ANTICOAGULANT 2.5 MG tablet    ELIQUIS    180 tablet    Take 1 tablet (2.5 mg) by mouth 2 times daily    Atrial flutter, paroxysmal (H)       azaTHIOprine 50 MG tablet    IMURAN    90 tablet    Take 1 tablet (50 mg) by mouth daily    Rheumatoid arthritis involving multiple sites with positive rheumatoid factor (H)       bismuth subsalicylate 262 MG chewable tablet    PEPTO BISMOL    100 tablet    Take 2 tablets (524 mg) by mouth 4 times daily as needed    Dyspepsia and disorder of function of stomach       Blood Pressure Monitor Kit     1 kit    1 kit daily as needed    CHF (congestive heart failure) (H)       bumetanide 1 MG tablet    BUMEX    30 tablet    Take 1 tablet (1 mg) by mouth every morning    Chronic diastolic congestive heart failure (H)       calcium-vitamin D 600-400 MG-UNIT per tablet    CALTRATE     Take 1 tablet by mouth 2 times daily        COMPOUNDED NON-CONTROLLED SUBSTANCE - PHARMACY TO MIX COMPOUNDED MEDICATION    CMPD RX    30 g    Estriol 1mg/gram. Place 1 gram vaginally daily for two weeks, then vaginally twice weekly after.    Atrophic vaginitis       Cranberry 180 MG Caps      Take 1 capsule by mouth daily Unsure of strength        fish oil-omega-3 fatty acids 1000 MG capsule      Take 2 g by mouth daily. 2 capsules daily        FLAGYL PO      Take 500 mg by mouth 2 times daily    Acute infectious diarrhea       folic acid 1 MG tablet    FOLVITE    90 tablet    Take 1 tablet (1 mg) by mouth daily    Oral aphthae       GENTEAL MILD OP      Apply  to eye daily.        hydrocortisone 2.5 % cream     30 g    Apply BID to affected region(s) for 7-10 days.    Rash       levothyroxine 75 MCG tablet     SYNTHROID/LEVOTHROID    90 tablet    TAKE ONE TABLET BY MOUTH EVERY DAY    Hypothyroidism, unspecified type       loratadine 10 MG tablet    CLARITIN     Take 10 mg by mouth every morning        LORazepam 1 MG tablet    ATIVAN    30 tablet    Take 1 tablet (1 mg) by mouth 2 times daily    Anxiety       losartan 50 MG tablet    COZAAR    90 tablet    Take 1 tablet (50 mg) by mouth daily    Heart failure with preserved ejection fraction, NYHA class I (H)       metoprolol tartrate 25 MG tablet    LOPRESSOR    60 tablet    Take 1 tablet (25 mg) by mouth 2 times daily    Atrial fibrillation, unspecified type (H), Hypertension goal BP (blood pressure) < 150/90       nitroGLYcerin 0.4 MG sublingual tablet    NITROSTAT    25 tablet    Place 1 tablet (0.4 mg) under the tongue every 5 minutes as needed for chest pain    Chest pain       * order for DME     1 Box    Equipment being ordered: Dispense face mask. Mrs. Spicer is immunosuppressed due to rheumatoid arthritis.    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 Use with Albuterol.    Hypoxia       order for DME     1 each    Equipment being ordered: Dispense baffle, for use with nebulizer.    Pneumonia of left upper lobe due to Mycoplasma pneumoniae       * order for DME     1 each    Dispense SP Walker-small    Pain of toe of right foot, Status post bunionectomy       PRESERVISION AREDS PO      Take 1 tablet by mouth daily        ranibizumab 0.3 MG/0.05ML Soln    LUCENTIS     0.3 mg by Intravitreal route        ranitidine 150 MG tablet    ZANTAC    60 tablet    Take 1 tablet (150 mg) by mouth 2 times daily    Gastroesophageal reflux disease without esophagitis       REFRESH P.M. Oint      Apply  to eye. Daily at bedtime        saccharomyces boulardii 250 MG capsule    FLORASTOR     Take 250 mg by mouth daily        senna-docusate 8.6-50 MG per tablet     SENOKOT-S;PERICOLACE    120 tablet    Take 2 tablets by mouth At Bedtime Hold if diarrhea occurs.    Constipation, chronic       triamcinolone 0.1 % cream    KENALOG    453.6 g    Apply sparingly to affected area on face three times daily.    Facial lesion       VITAMIN C PO           ZYRTEC ALLERGY 10 MG tablet   Generic drug:  cetirizine      Take 10 mg by mouth At Bedtime        * Notice:  This list has 3 medication(s) that are the same as other medications prescribed for you. Read the directions carefully, and ask your doctor or other care provider to review them with you.

## 2018-05-14 ENCOUNTER — TELEPHONE (OUTPATIENT)
Dept: UROLOGY | Facility: CLINIC | Age: 83
End: 2018-05-14

## 2018-05-14 DIAGNOSIS — N39.0 RECURRENT UTI: ICD-10-CM

## 2018-05-14 DIAGNOSIS — N39.0 RECURRENT UTI: Primary | ICD-10-CM

## 2018-05-14 LAB
ALBUMIN UR-MCNC: NEGATIVE MG/DL
APPEARANCE UR: CLEAR
BACTERIA #/AREA URNS HPF: ABNORMAL /HPF
BILIRUB UR QL STRIP: NEGATIVE
COLOR UR AUTO: YELLOW
GLUCOSE UR STRIP-MCNC: NEGATIVE MG/DL
HGB UR QL STRIP: NEGATIVE
KETONES UR STRIP-MCNC: NEGATIVE MG/DL
LEUKOCYTE ESTERASE UR QL STRIP: ABNORMAL
NITRATE UR QL: NEGATIVE
NON-SQ EPI CELLS #/AREA URNS LPF: ABNORMAL /LPF
PH UR STRIP: 6 PH (ref 5–7)
RBC #/AREA URNS AUTO: ABNORMAL /HPF
SOURCE: ABNORMAL
SP GR UR STRIP: <=1.005 (ref 1–1.03)
UROBILINOGEN UR STRIP-ACNC: 0.2 EU/DL (ref 0.2–1)
WBC #/AREA URNS AUTO: ABNORMAL /HPF

## 2018-05-14 PROCEDURE — 81001 URINALYSIS AUTO W/SCOPE: CPT | Performed by: PHYSICIAN ASSISTANT

## 2018-05-14 NOTE — TELEPHONE ENCOUNTER
Future UA/UC ordered per protocol. Will watch for results and contact the patient once results are available.     Sena Chapman RN, BSN

## 2018-05-14 NOTE — TELEPHONE ENCOUNTER
See result note from 5/14/18. Relayed normal UA result to pt per Asia Steven PA-C. Pt had no further questions/concerns.    Minna Armenta LPN

## 2018-05-14 NOTE — TELEPHONE ENCOUNTER
5.14.18  Patient is going to [facility] for UA and there is no order placed.  Pt will be getting a ride within the next hour and is asking for an order to be placed.  Dr Steven said she was going to put an order in.

## 2018-05-30 ENCOUNTER — INFUSION THERAPY VISIT (OUTPATIENT)
Dept: INFUSION THERAPY | Facility: CLINIC | Age: 83
End: 2018-05-30
Payer: MEDICARE

## 2018-05-30 VITALS
HEART RATE: 63 BPM | OXYGEN SATURATION: 98 % | TEMPERATURE: 98.6 F | RESPIRATION RATE: 18 BRPM | DIASTOLIC BLOOD PRESSURE: 59 MMHG | SYSTOLIC BLOOD PRESSURE: 150 MMHG

## 2018-05-30 DIAGNOSIS — M05.79 RHEUMATOID ARTHRITIS INVOLVING MULTIPLE SITES WITH POSITIVE RHEUMATOID FACTOR (H): Primary | ICD-10-CM

## 2018-05-30 PROCEDURE — 99207 ZZC NO CHARGE LOS: CPT

## 2018-05-30 PROCEDURE — 96365 THER/PROPH/DIAG IV INF INIT: CPT | Performed by: INTERNAL MEDICINE

## 2018-05-30 RX ORDER — ALBUTEROL SULFATE 0.83 MG/ML
1 SOLUTION RESPIRATORY (INHALATION) EVERY 6 HOURS PRN
Status: ON HOLD | COMMUNITY
End: 2018-12-04

## 2018-05-30 RX ADMIN — Medication 250 ML: at 10:29

## 2018-05-30 NOTE — MR AVS SNAPSHOT
After Visit Summary   5/30/2018    Linda pSicer    MRN: [id number]           Patient Information     Date Of Birth          6/13/1931        Visit Information        Provider Department      5/30/2018 10:00 AM B[place] 9 INFUSION [facility]        Today's Diagnoses     Rheumatoid arthritis involving multiple sites with positive rheumatoid factor (H)    -  1       Follow-ups after your visit        Your next 10 appointments already scheduled     Jun 01, 2018 10:30 AM CDT   Return Visit with Asia Steven PA-C   [facility])    [address] [place] [address]   [phone]            Jun 27, 2018  9:15 AM CDT   Level 2 with [place]AY 7 INFUSION   [facility]    [address] [place] [address]   [phone]            Jul 24, 2018 10:00 AM CDT   Level 2 with [place] 2 [facility] ([facility])    [address] [place] [address]   [phone]            Aug 20, 2018 12:30 PM CDT   Return Visit with Emeterio Loza MD   [facility])    [address] [place] [address]   [phone]            Sep 05, 2018 10:00 AM CDT   Return Visit with DEVICE CHECK RN CARD   [facility] ([facility])    [address] [place] [address]   [phone]            Sep 11, 2018 10:00 AM CDT   Return Visit with Mario Davenport MD   [facility] ([facility])    [address] [place] [address]   [phone]            Jim 15, 2019  9:30 AM CST   Return Visit with Sandra Comer MD   [facility])    [address] [place] [address]   [phone]              Who to contact     If you have  questions or need follow up information about today's clinic visit or your schedule please contact [facility] directly at [phone].  Normal or non-critical lab and imaging results will be communicated to you by [employer]hart, letter or phone within 4 business days after the clinic has received the results. If you do not hear from us within 7 days, please contact the clinic through [employer] or phone. If you have a critical or abnormal lab result, we will notify you by phone as soon as possible.  Submit refill requests through [employer] or call your pharmacy and they will forward the refill request to us. Please allow 3 business days for your refill to be completed.          Additional Information About Your Visit        [employer] Information     [employer] gives you secure access to your electronic health record. If you see a primary care provider, you can also send messages to your care team and make appointments. If you have questions, please call your primary care clinic.  If you do not have a primary care provider, please call [phone] and they will assist you.      K[employer]izen is an electronic gateway that provides easy, online access to your medical records. With [employer], you can request a clinic appointment, read your test results, renew a prescription or communicate with your care team.     To access your existing account, please contact your [facility] or call [phone] for assistance.        Care EveryWhere ID     This is your Care EveryWhere ID. This could be used by other organizations to access your [facility] medical records  [id number]        Your Vitals Were     Pulse Temperature Respirations Pulse Oximetry          63 98.6  F (37  C) (Oral) 18 98%         Blood Pressure from Last 3 Encounters:   05/30/18 150/59   05/08/18 144/72   05/02/18 139/76    Weight from Last 3 Encounters:   05/08/18 74.3 kg (163 lb 12.8 oz)   05/02/18 74.3 kg (163 lb 14.4 oz)    04/23/18 74.4 kg (164 lb 0.4 oz)              Today, you had the following     No orders found for display       Primary Care Provider Office Phone # Fax #    Tre Lockett [phone]       [address]  [place] [facility] [address]        Goals        General    I will complete my health care directive. (pt-stated)     Notes - Note created  3/29/2018  3:07 PM by Behl, Melissa K, RN    Supporting Steps:    Pt has attended  a health care  directive clas-  s, is awaiting  to see her law-  elizabeth to finalize  the document.  10/24/17 Pt  informed of CPR  vs intubation  and information  mailed out to  patient.           I will schedule an appointment with endocrinology. (pt-stated)       Equal Access to Services     [facility] : Paul gallegos Sojordin, waaxda luqadaha, qaybta kaalmada adeegyada, zahraa moss . So [facility] [phone].    ATENCIÓN: Si habla español, tiene a merchant disposición servicios gratuitos de asistencia lingüística. Llame al [phone].    We comply with applicable federal civil rights laws and [place] laws. We do not discriminate on the basis of race, color, national origin, age, disability, sex, sexual orientation, or gender identity.            Thank you!     Thank you for choosing [facility]  for your care. Our goal is always to provide you with excellent care. Hearing back from our patients is one way we can continue to improve our services. Please take a few minutes to complete the written survey that you may receive in the mail after your visit with us. Thank you!             Your Updated Medication List - Protect others around you: Learn how to safely use, store and throw away your medicines at www.disposemymeds.org.          This list is accurate as of 5/30/18 11:06 AM.  Always use your most recent med list.                   Brand Name Dispense Instructions for use Diagnosis    albuterol (2.5 MG/3ML) 0.083% neb  solution      Take 1 vial by nebulization every 6 hours as needed for shortness of breath / dyspnea or wheezing        apixaban ANTICOAGULANT 2.5 MG tablet    ELIQUIS    180 tablet    Take 1 tablet (2.5 mg) by mouth 2 times daily    Atrial flutter, paroxysmal (H)       azaTHIOprine 50 MG tablet    IMURAN    90 tablet    Take 1 tablet (50 mg) by mouth daily    Rheumatoid arthritis involving multiple sites with positive rheumatoid factor (H)       bismuth subsalicylate 262 MG chewable tablet    PEPTO BISMOL    100 tablet    Take 2 tablets (524 mg) by mouth 4 times daily as needed    Dyspepsia and disorder of function of stomach       Blood Pressure Monitor Kit     1 kit    1 kit daily as needed    CHF (congestive heart failure) (H)       bumetanide 1 MG tablet    BUMEX    30 tablet    Take 1 tablet (1 mg) by mouth every morning    Chronic diastolic congestive heart failure (H)       calcium-vitamin D 600-400 MG-UNIT per tablet    CALTRATE     Take 1 tablet by mouth 2 times daily        COMPOUNDED NON-CONTROLLED SUBSTANCE - PHARMACY TO MIX COMPOUNDED MEDICATION    CMPD RX    30 g    Estriol 1mg/gram. Place 1 gram vaginally daily for two weeks, then vaginally twice weekly after.    Atrophic vaginitis       Cranberry 180 MG Caps      Take 1 capsule by mouth daily Unsure of strength        fish oil-omega-3 fatty acids 1000 MG capsule      Take 2 g by mouth daily. 2 capsules daily        FLAGYL PO      Take 500 mg by mouth 2 times daily    Acute infectious diarrhea       folic acid 1 MG tablet    FOLVITE    90 tablet    Take 1 tablet (1 mg) by mouth daily    Oral aphthae       GENTEAL MILD OP      Apply  to eye daily.        hydrocortisone 2.5 % cream     30 g    Apply BID to affected region(s) for 7-10 days.    Rash       levothyroxine 75 MCG tablet    SYNTHROID/LEVOTHROID    90 tablet    TAKE ONE TABLET BY MOUTH EVERY DAY    Hypothyroidism, unspecified type       loratadine 10 MG tablet    CLARITIN     Take 10 mg by  mouth every morning        LORazepam 1 MG tablet    ATIVAN    30 tablet    Take 1 tablet (1 mg) by mouth 2 times daily    Anxiety       losartan 50 MG tablet    COZAAR    90 tablet    Take 1 tablet (50 mg) by mouth daily    Heart failure with preserved ejection fraction, NYHA class I (H)       metoprolol tartrate 25 MG tablet    LOPRESSOR    60 tablet    Take 1 tablet (25 mg) by mouth 2 times daily    Atrial fibrillation, unspecified type (H), Hypertension goal BP (blood pressure) < 150/90       nitroGLYcerin 0.4 MG sublingual tablet    NITROSTAT    25 tablet    Place 1 tablet (0.4 mg) under the tongue every 5 minutes as needed for chest pain    Chest pain       * order for DME     1 Box    Equipment being ordered: Dispense face mask. Mrs. Spicer is immunosuppressed due to rheumatoid arthritis.    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 Use with Albuterol.    Hypoxia       order for DME     1 each    Equipment being ordered: Dispense baffle, for use with nebulizer.    Pneumonia of left upper lobe due to Mycoplasma pneumoniae       * order for DME     1 each    Dispense SP Walker-small    Pain of toe of right foot, Status post bunionectomy       PRESERVISION AREDS PO      Take 1 tablet by mouth daily        ranibizumab 0.3 MG/0.05ML Soln    LUCENTIS     0.3 mg by Intravitreal route        ranitidine 150 MG tablet    ZANTAC    60 tablet    Take 1 tablet (150 mg) by mouth 2 times daily    Gastroesophageal reflux disease without esophagitis       REFRESH P.M. Oint      Apply  to eye. Daily at bedtime        saccharomyces boulardii 250 MG capsule    FLORASTOR     Take 250 mg by mouth daily        senna-docusate 8.6-50 MG per tablet    SENOKOT-S;PERICOLACE    120 tablet    Take 2 tablets by mouth At Bedtime Hold if diarrhea occurs.    Constipation, chronic       triamcinolone 0.1 % cream    KENALOG    453.6 g     Apply sparingly to affected area on face three times daily.    Facial lesion       VITAMIN C PO           ZYRTEC ALLERGY 10 MG tablet   Generic drug:  cetirizine      Take 10 mg by mouth At Bedtime        * Notice:  This list has 3 medication(s) that are the same as other medications prescribed for you. Read the directions carefully, and ask your doctor or other care provider to review them with you.

## 2018-05-30 NOTE — PROGRESS NOTES
"Infusion Nursing Note:  Linda KATEY Orellana presents today for orencia.    Patient seen by provider today: No   present during visit today: Not Applicable.    Note: N/A.    Intravenous Access:  Peripheral IV placed.    Treatment Conditions:  Rheumatology Infusion Checklist: PRIOR TO INFUSION OF BIOLOGICAL MEDICATIONS OR ANY OF THESE AS LISTED: Orencia (abatacept) \".rheumbiologicalchecklist\"    Prior to Infusion of biological medications or any of these as listed:    1. Elevated temperature, fever, chills, productive cough or abnormal vital signs, night sweats, coughing up blood or sputum, no appetite or abnormal vital signs : NO    2. Open wounds or new incisions: NO    3. Recent hospitalization: NO    4.  Recent surgeries:  NO    5. Any upcoming surgeries or dental procedures?:NO    6. Any current or recent bouts of illness or infection? On any antibiotics? : NO    7. Any new, sudden or worsening abdominal pain :NO    8. Vaccination within 4 weeks? Patient or someone in the household is scheduled to receive vaccination? No live virus vaccines prior to or during treatment :NO    9. Any nervous system diseases [i.e. multiple sclerosis, Guillain-L[place], seizures, neurological  changes]: NO    10. Pregnant or breast feeding; or plans on pregnancy in the future: NO    11. Signs of worsening depression or suicidal ideations while taking benlysta:NO    12. New-onset medical symptoms: NO    13.  New cancer diagnosis or on chemotherapy or radiation NO    14.  Evaluate for any sign of active TB [Unexplained weight loss, Loss of appetite, Night sweats, Fever, Fatigue, Chills, Coughing for 3 weeks or longer, Hemoptysis (coughing up blood), Chest pain]: NO    **Note: If answered yes to any of the above, hold the infusion and contact ordering rheumatologist or on-call rheumatologist.   .      Post Infusion Assessment:  Patient tolerated infusion without incident.  No evidence of extravasations.  Access discontinued per " protocol.    Discharge Plan:   Patient will return 6/27/18 for next appointment.   Patient discharged in stable condition accompanied by: self and daughter.  Departure Mode: Ambulatory.    Lacey Piña RN

## 2018-06-01 ENCOUNTER — OFFICE VISIT (OUTPATIENT)
Dept: UROLOGY | Facility: CLINIC | Age: 83
End: 2018-06-01
Payer: MEDICARE

## 2018-06-01 VITALS — SYSTOLIC BLOOD PRESSURE: 123 MMHG | HEART RATE: 62 BPM | DIASTOLIC BLOOD PRESSURE: 59 MMHG | OXYGEN SATURATION: 98 %

## 2018-06-01 DIAGNOSIS — N39.0 RECURRENT UTI: Primary | ICD-10-CM

## 2018-06-01 DIAGNOSIS — N95.2 ATROPHIC VAGINITIS: ICD-10-CM

## 2018-06-01 PROCEDURE — 99213 OFFICE O/P EST LOW 20 MIN: CPT | Performed by: PHYSICIAN ASSISTANT

## 2018-06-01 ASSESSMENT — PAIN SCALES - GENERAL: PAINLEVEL: NO PAIN (0)

## 2018-06-01 NOTE — MR AVS SNAPSHOT
After Visit Summary   6/1/2018    Linda Spicer    MRN: [id number]           Patient Information     Date Of Birth          6/13/1931        Visit Information        Provider Department      6/1/2018 10:30 AM Asia Steven PA-C [facility]        Today's Diagnoses     Recurrent UTI    -  1    Atrophic vaginitis           Follow-ups after your visit        Your next 10 appointments already scheduled     Jun 28, 2018  9:30 AM CDT   Level 2 with BAY 5 INFUSION   [facility] ([facility])    [address] [place] [address]   [phone]            Jul 26, 2018  9:30 AM CDT   Level 2 with BAY 6 INFUSION   [facility] ([facility])    [address] [place] [address]   [phone]            Aug 20, 2018 12:30 PM CDT   Return Visit with Emeterio Loza MD   [facility])    [address] [place] [address]   [phone]            Sep 05, 2018 10:00 AM CDT   Return Visit with DEVICE CHECK RN CARD   [facility] ([facility])    [address] [place] [address]   [phone]            Sep 11, 2018 10:00 AM CDT   Return Visit with Mario Davenport MD   [facility])    [address] [facility] [address]   [phone]            Jim 15, 2019  9:30 AM CST   Return Visit with Sandra Comer MD   [facility] ([facility])    [address] [facility] [address]   [phone]            Mar 01, 2019 10:00 AM CST   Return Visit with Asia Steven PA-C   [facility])    [address] [facility] [address]   [phone]              Who to contact     If you have questions or need follow up information  about today's clinic visit or your schedule please contact [facility] directly at [phone].  Normal or non-critical lab and imaging results will be communicated to you by [employer], letter or phone within 4 business days after the clinic has received the results. If you do not hear from us within 7 days, please contact the clinic through [employer] or phone. If you have a critical or abnormal lab result, we will notify you by phone as soon as possible.  Submit refill requests through [facility] or call your pharmacy and they will forward the refill request to us. Please allow 3 business days for your refill to be completed.          Additional Information About Your Visit        [employer] Information     [facility] gives you secure access to your electronic health record. If you see a primary care provider, you can also send messages to your care team and make appointments. If you have questions, please call your primary care clinic.  If you do not have a primary care provider, please call [phone] and they will assist you.      [facility] is an electronic gateway that provides easy, online access to your medical records. With [facility], you can request a clinic appointment, read your test results, renew a prescription or communicate with your care team.     To access your existing account, please contact your [facility] or call [phone] for assistance.        Care EveryWhere ID     This is your Care EveryWhere ID. This could be used by other organizations to access your [place] medical records  [id number]        Your Vitals Were     Pulse Pulse Oximetry                62 98%           Blood Pressure from Last 3 Encounters:   06/01/18 123/59   05/30/18 150/59   05/08/18 144/72    Weight from Last 3 Encounters:   05/08/18 74.3 kg (163 lb 12.8 oz)   05/02/18 74.3 kg (163 lb 14.4 oz)   04/23/18 74.4 kg (164 lb 0.4 oz)              Today, you had the following     No orders  found for display       Primary Care Provider Office Phone # Fax #    Tre Lockett [phone]       [address]  [facility] [address]        Goals        General    I will complete my health care directive. (pt-stated)     Notes - Note created  3/29/2018  3:07 PM by Behl, Melissa K, RN    Supporting Steps:    Pt has attended  a health care  directive clas-  s, is awaiting  to see her law-  elizabeth to finalize  the document.  10/24/17 Pt  informed of CPR  vs intubation  and information  mailed out to  patient.           I will schedule an appointment with endocrinology. (pt-stated)       Equal Access to Services     [facility]: Hadii aad mariely hadasho Soomaali, waaxda luqadaha, qaybta kaalmada adeegyada, zahraa moss . So [facility] [phone].    ATENCIÓN: Si habla español, tiene a merchant disposición servicios gratuitos de asistencia lingüística. Llame al [phone].    We comply with applicable federal civil rights laws and [place] laws. We do not discriminate on the basis of race, color, national origin, age, disability, sex, sexual orientation, or gender identity.            Thank you!     Thank you for choosing [facility]  for your care. Our goal is always to provide you with excellent care. Hearing back from our patients is one way we can continue to improve our services. Please take a few minutes to complete the written survey that you may receive in the mail after your visit with us. Thank you!             Your Updated Medication List - Protect others around you: Learn how to safely use, store and throw away your medicines at www.disposemymeds.org.          This list is accurate as of 6/1/18 10:58 AM.  Always use your most recent med list.                   Brand Name Dispense Instructions for use Diagnosis    albuterol (2.5 MG/3ML) 0.083% neb solution      Take 1 vial by nebulization every 6 hours as needed for shortness of breath /  dyspnea or wheezing        apixaban ANTICOAGULANT 2.5 MG tablet    ELIQUIS    180 tablet    Take 1 tablet (2.5 mg) by mouth 2 times daily    Atrial flutter, paroxysmal (H)       azaTHIOprine 50 MG tablet    IMURAN    90 tablet    Take 1 tablet (50 mg) by mouth daily    Rheumatoid arthritis involving multiple sites with positive rheumatoid factor (H)       bismuth subsalicylate 262 MG chewable tablet    PEPTO BISMOL    100 tablet    Take 2 tablets (524 mg) by mouth 4 times daily as needed    Dyspepsia and disorder of function of stomach       Blood Pressure Monitor Kit     1 kit    1 kit daily as needed    CHF (congestive heart failure) (H)       bumetanide 1 MG tablet    BUMEX    30 tablet    Take 1 tablet (1 mg) by mouth every morning    Chronic diastolic congestive heart failure (H)       calcium-vitamin D 600-400 MG-UNIT per tablet    CALTRATE     Take 1 tablet by mouth 2 times daily        COMPOUNDED NON-CONTROLLED SUBSTANCE - PHARMACY TO MIX COMPOUNDED MEDICATION    CMPD RX    30 g    Estriol 1mg/gram. Place 1 gram vaginally daily for two weeks, then vaginally twice weekly after.    Atrophic vaginitis       Cranberry 180 MG Caps      Take 1 capsule by mouth daily Unsure of strength        fish oil-omega-3 fatty acids 1000 MG capsule      Take 2 g by mouth daily. 2 capsules daily        FLAGYL PO      Take 500 mg by mouth 2 times daily    Acute infectious diarrhea       folic acid 1 MG tablet    FOLVITE    90 tablet    Take 1 tablet (1 mg) by mouth daily    Oral aphthae       GENTEAL MILD OP      Apply  to eye daily.        hydrocortisone 2.5 % cream     30 g    Apply BID to affected region(s) for 7-10 days.    Rash       levothyroxine 75 MCG tablet    SYNTHROID/LEVOTHROID    90 tablet    TAKE ONE TABLET BY MOUTH EVERY DAY    Hypothyroidism, unspecified type       loratadine 10 MG tablet    CLARITIN     Take 10 mg by mouth every morning        LORazepam 1 MG tablet    ATIVAN    30 tablet    Take 1 tablet (1 mg)  by mouth 2 times daily    Anxiety       losartan 50 MG tablet    COZAAR    90 tablet    Take 1 tablet (50 mg) by mouth daily    Heart failure with preserved ejection fraction, NYHA class I (H)       metoprolol tartrate 25 MG tablet    LOPRESSOR    60 tablet    Take 1 tablet (25 mg) by mouth 2 times daily    Atrial fibrillation, unspecified type (H), Hypertension goal BP (blood pressure) < 150/90       nitroGLYcerin 0.4 MG sublingual tablet    NITROSTAT    25 tablet    Place 1 tablet (0.4 mg) under the tongue every 5 minutes as needed for chest pain    Chest pain       * order for DME     1 Box    Equipment being ordered: Dispense face mask. Mrs. Spicer is immunosuppressed due to rheumatoid arthritis.    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 Use with Albuterol.    Hypoxia       order for DME     1 each    Equipment being ordered: Dispense baffle, for use with nebulizer.    Pneumonia of left upper lobe due to Mycoplasma pneumoniae       * order for DME     1 each    Dispense SP Walker-small    Pain of toe of right foot, Status post bunionectomy       PRESERVISION AREDS PO      Take 1 tablet by mouth daily        ranibizumab 0.3 MG/0.05ML Soln    LUCENTIS     0.3 mg by Intravitreal route        ranitidine 150 MG tablet    ZANTAC    60 tablet    Take 1 tablet (150 mg) by mouth 2 times daily    Gastroesophageal reflux disease without esophagitis       REFRESH P.M. Oint      Apply  to eye. Daily at bedtime        saccharomyces boulardii 250 MG capsule    FLORASTOR     Take 250 mg by mouth daily        senna-docusate 8.6-50 MG per tablet    SENOKOT-S;PERICOLACE    120 tablet    Take 2 tablets by mouth At Bedtime Hold if diarrhea occurs.    Constipation, chronic       triamcinolone 0.1 % cream    KENALOG    453.6 g    Apply sparingly to affected area on face three times daily.    Facial lesion       VITAMIN C PO            ZYRTEC ALLERGY 10 MG tablet   Generic drug:  cetirizine      Take 10 mg by mouth At Bedtime        * Notice:  This list has 3 medication(s) that are the same as other medications prescribed for you. Read the directions carefully, and ask your doctor or other care provider to review them with you.

## 2018-06-01 NOTE — PROGRESS NOTES
Linda Spicer's goals for this visit include:   Chief Complaint   Patient presents with     RECHECK     Recurrent UTI       She requests these members of her care team be copied on today's visit information: Yes    PCP: Tre Lockett    Referring Provider:  No referring provider defined for this encounter.    /59 (BP Location: Left arm, Patient Position: Sitting, Cuff Size: Adult Large)  Pulse 62  SpO2 98%    Do you need any medication refills at today's visit? No      post void residual  260ml

## 2018-06-01 NOTE — PROGRESS NOTES
UROLOGY OFFICE VISIT - FOLLOW UP    REASON FOR VISIT: follow up recurrent UTI    HPI: Ms. Linda Spicer is an 86 year old female who returns to the urology clinic for follow up of recurrent UTI's. Seen in initial consultation on 3/2/18 - please see consult note from this date for full history. In brief, she has had issues with frequent UTI's for a few years, but they have been worsening since November 2017. She reports having 3 infections from November to March (see below). Of note, she typically does not have any symptoms when she gets diagnosed with a UTI - she just gets urine samples checked when she is in clinic which then come back positive. She has never had a febrile UTI or been hospitalized for UTI's. She was started on daily low dose Cipro by her PCP in the Spring of 2017 which worked well to reduce her infections but caused a rash so she eventually stopped it. At last office visit, her PVR was noted to be somewhat elevated to 202 mL. She was therefore encouraged to work on double voiding. Also started on vaginal estrogen cream and recommended to not check urine samples unless she has urinary symptoms concerning for a possible infection. Otherwise, this may just be asymptomatic bacteriuria which does not necessarily require treatment.     She follows up and today and reports no further infections since starting the vaginal estrogen. She is very pleased with this. Continues to work on double voiding on occasion, but often forgets.    REVIEW OF DIAGNOSTICS:   2/13/18 - UA: negative --> UC: 10-50K coag neg Staph, >100K mixed  osvaldo  1/22/18 - UA: negative  1/15/18 - UA: negative  1/4/18 - UA: large blood, large LE, >100 WBC, 10-25 RBC, few bacteria --> UC: 10-50K mixed  osvaldo  12/4/17 -  UA: negative  11/27/17 - UA: trace LE, 0-2 WBC, 0-2 RBC, few bacteria  11/25/17 - UA: negative  11/21/17 - UA: small blood, large LE, >100 WBC, 2-5 RBC, many bacteria --> UC: >100K Citrobacter amalonaticus  8/30/17 - UA:  negative  7/21/17 - UA: negative  6/15/17 - UA: negative  2/3/17 - UA: negative    PEx  /59 (BP Location: Left arm, Patient Position: Sitting, Cuff Size: Adult Large)  Pulse 62  SpO2 98%  GEN: well-appearing female in NAD  RESP: no increased respiratory effort    PVR: 260 mL measured by ultrasound today    A/P  85 yo female with recurrent UTI's vs. asymptomatic bacteriuria. Since starting vaginal estrogen therapy, she has had no further UTI's. She continues to not empty completely (PVR today 260 cc and was 202 cc at last visit). We therefore discussed continuing to double void vs. starting a clean intermittent catheterization regimen once or twice a day. She is really hoping to avoid catheters if possible. As she has had no further infections, would be reasonable to hold off on CIC for now. However, if she starts to get infections again, would then start CIC once daily to assist with bladder emptying.  -Continue vaginal estrogen cream twice weekly.  -Continue double voiding.     Follow up in March 2019 for recheck, PVR. Call or RTC sooner with any issues.     Asia Steven PA-C  Department of Urology

## 2018-06-24 ENCOUNTER — ALLIED HEALTH/NURSE VISIT (OUTPATIENT)
Dept: CARDIOLOGY | Facility: CLINIC | Age: 83
End: 2018-06-24
Attending: INTERNAL MEDICINE
Payer: MEDICARE

## 2018-06-24 ENCOUNTER — TELEPHONE (OUTPATIENT)
Dept: CARDIOLOGY | Facility: CLINIC | Age: 83
End: 2018-06-24

## 2018-06-24 DIAGNOSIS — I49.5 SICK SINUS SYNDROME (H): Primary | ICD-10-CM

## 2018-06-24 PROCEDURE — 93296 REM INTERROG EVL PM/IDS: CPT | Mod: ZF

## 2018-06-24 PROCEDURE — 93294 REM INTERROG EVL PM/LDLS PM: CPT | Performed by: INTERNAL MEDICINE

## 2018-06-24 NOTE — MR AVS SNAPSHOT
After Visit Summary   6/24/2018    Linda Spicer    MRN: [id number]           Patient Information     Date Of Birth          6/13/1931        Visit Information        Provider Department      6/24/2018 6:00 AM  ICD REMOTE [facility]        Today's Diagnoses     Sick sinus syndrome (H)    -  1       Follow-ups after your visit        Your next 10 appointments already scheduled     Jul 26, 2018  9:30 AM CDT   Level 1 with BAY 6 INFUSION   [facility]    [address] [place] [address]   [phone]            Aug 20, 2018 12:30 PM CDT   Return Visit with Emeterio Loza MD   [facility]    [address] [place] [address]   [phone]            Aug 23, 2018  9:00 AM CDT   Level 1 with BAY 8 INFUSION   [facility])    [address] [place] [address]   [phone]            Sep 05, 2018 10:00 AM CDT   Return Visit with DEVICE CHECK RN CARD   [facility] ([facility])    [address] [place] [address]   [phone]            Sep 11, 2018 10:00 AM CDT   Return Visit with Mario Davenport MD   [facility] ([facility])    [address] [facility] [address]   [phone]            Dec 11, 2018 10:00 AM CST   Office Visit with PFT LAB   [facility])    [address] [place] [address]   [phone]           Bring a current list of meds and any records pertaining to this visit. For Physicals, please bring immunization records and any forms needing to be filled out. Please arrive 10 minutes early to complete paperwork.            Dec 11, 2018 11:00 AM CST   Return Visit with Dea Acosta MD   [facility]  [facility])    [address] [place] [id number] [phone]            Mar 01, 2019 10:00 AM CST   Return Visit with Asia Steven PA-C   [facility] ([facility])    [address] [address] [place] [id number] [phone]              Who to contact     If you have questions or need follow up information about today's clinic visit or your schedule please contact [facility] directly at [phone].  Normal or non-critical lab and imaging results will be communicated to you by [employer], letter or phone within 4 business days after the clinic has received the results. If you do not hear from us within 7 days, please contact the clinic through [facility] [employer] or phone. If you have a critical or abnormal lab result, we will notify you by phone as soon as possible.  Submit refill requests through [facility] or call your pharmacy and they will forward the refill request to us. Please allow 3 business days for your refill to be completed.          Additional Information About Your Visit        MyChart Information     [facility] gives you secure access to your electronic health record. If you see a primary care provider, you can also send messages to your care team and make appointments. If you have questions, please call your primary care clinic.  If you do not have a primary care provider, please call [phone] and they will assist you.        Care EveryWhere ID     This is your Care EveryWhere ID. This could be used by other organizations to access your [place] medical records  [id number]         Blood Pressure from Last 3 Encounters:   06/28/18 120/70   06/01/18 123/59   05/30/18 150/59    Weight from Last 3 Encounters:   06/28/18 75.2 kg (165 lb 11.2 oz)   05/08/18 74.3 kg (163 lb 12.8 oz)   05/02/18 74.3 kg (163 lb 14.4 oz)              We Performed the Following     INTERROGATION DEVICE EVAL REMOTE, PACER/ICD        Primary Care Provider Office  Phone # Fax #    Tre Jenny Lockett [phone]       [address]  [facility] [address]        Goals        General    I will complete my health care directive. (pt-stated)     Notes - Note created  3/29/2018  3:07 PM by Behl, Melissa K, RN    Supporting Steps:    Pt has attended  a health care  directive clas-  s, is awaiting  to see her law-  elizabeth to finalize  the document.  10/24/17 Pt  informed of CPR  vs intubation  and information  mailed out to  patient.           I will schedule an appointment with endocrinology. (pt-stated)       Equal Access to Services     [facility]: Hadii aad ku hadasho Soomaali, waaxda luqadaha, qaybta kaalmada adeegyada, zahraa moss . So [facility] [phone].    ATENCIÓN: Si habla español, tiene a merchant disposición servicios gratuitos de asistencia lingüística. Llame al [phone].    We comply with applicable federal civil rights laws and [place] laws. We do not discriminate on the basis of race, color, national origin, age, disability, sex, sexual orientation, or gender identity.            Thank you!     Thank you for choosing [facility]  for your care. Our goal is always to provide you with excellent care. Hearing back from our patients is one way we can continue to improve our services. Please take a few minutes to complete the written survey that you may receive in the mail after your visit with us. Thank you!             Your Updated Medication List - Protect others around you: Learn how to safely use, store and throw away your medicines at www.disposemymeds.org.          This list is accurate as of 6/24/18 11:59 PM.  Always use your most recent med list.                   Brand Name Dispense Instructions for use Diagnosis    albuterol (2.5 MG/3ML) 0.083% neb solution      Take 1 vial by nebulization every 6 hours as needed for shortness of breath / dyspnea or wheezing        apixaban ANTICOAGULANT 2.5 MG tablet     ELIQUIS    180 tablet    Take 1 tablet (2.5 mg) by mouth 2 times daily    Atrial flutter, paroxysmal (H)       azaTHIOprine 50 MG tablet    IMURAN    90 tablet    Take 1 tablet (50 mg) by mouth daily    Rheumatoid arthritis involving multiple sites with positive rheumatoid factor (H)       bismuth subsalicylate 262 MG chewable tablet    PEPTO BISMOL    100 tablet    Take 2 tablets (524 mg) by mouth 4 times daily as needed    Dyspepsia and disorder of function of stomach       Blood Pressure Monitor Kit     1 kit    1 kit daily as needed    CHF (congestive heart failure) (H)       bumetanide 1 MG tablet    BUMEX    30 tablet    Take 1 tablet (1 mg) by mouth every morning    Chronic diastolic congestive heart failure (H)       calcium-vitamin D 600-400 MG-UNIT per tablet    CALTRATE     Take 1 tablet by mouth 2 times daily        COMPOUNDED NON-CONTROLLED SUBSTANCE - PHARMACY TO MIX COMPOUNDED MEDICATION    CMPD RX    30 g    Estriol 1mg/gram. Place 1 gram vaginally daily for two weeks, then vaginally twice weekly after.    Atrophic vaginitis       Cranberry 180 MG Caps      Take 1 capsule by mouth daily Unsure of strength        fish oil-omega-3 fatty acids 1000 MG capsule      Take 2 g by mouth daily. 2 capsules daily        FLAGYL PO      Take 500 mg by mouth 2 times daily    Acute infectious diarrhea       folic acid 1 MG tablet    FOLVITE    90 tablet    Take 1 tablet (1 mg) by mouth daily    Oral aphthae       GENTEAL MILD OP      Apply  to eye daily.        hydrocortisone 2.5 % cream     30 g    Apply BID to affected region(s) for 7-10 days.    Rash       levothyroxine 75 MCG tablet    SYNTHROID/LEVOTHROID    90 tablet    TAKE ONE TABLET BY MOUTH EVERY DAY    Hypothyroidism, unspecified type       loratadine 10 MG tablet    CLARITIN     Take 10 mg by mouth every morning        LORazepam 1 MG tablet    ATIVAN    30 tablet    Take 1 tablet (1 mg) by mouth 2 times daily    Anxiety       losartan 50 MG tablet     COZAAR    90 tablet    Take 1 tablet (50 mg) by mouth daily    Heart failure with preserved ejection fraction, NYHA class I (H)       metoprolol tartrate 25 MG tablet    LOPRESSOR    60 tablet    Take 1 tablet (25 mg) by mouth 2 times daily    Atrial fibrillation, unspecified type (H), Hypertension goal BP (blood pressure) < 150/90       nitroGLYcerin 0.4 MG sublingual tablet    NITROSTAT    25 tablet    Place 1 tablet (0.4 mg) under the tongue every 5 minutes as needed for chest pain    Chest pain       * order for DME     1 Box    Equipment being ordered: Dispense face mask. Mrs. Spicer is immunosuppressed due to rheumatoid arthritis.    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 Use with Albuterol.    Hypoxia       order for DME     1 each    Equipment being ordered: Dispense baffle, for use with nebulizer.    Pneumonia of left upper lobe due to Mycoplasma pneumoniae       * order for DME     1 each    Dispense SP Walker-small    Pain of toe of right foot, Status post bunionectomy       PRESERVISION AREDS PO      Take 1 tablet by mouth daily        ranibizumab 0.3 MG/0.05ML Soln    LUCENTIS     0.3 mg by Intravitreal route        ranitidine 150 MG tablet    ZANTAC    60 tablet    Take 1 tablet (150 mg) by mouth 2 times daily    Gastroesophageal reflux disease without esophagitis       REFRESH P.M. Oint      Apply  to eye. Daily at bedtime        saccharomyces boulardii 250 MG capsule    FLORASTOR     Take 250 mg by mouth daily        senna-docusate 8.6-50 MG per tablet    SENOKOT-S;PERICOLACE    120 tablet    Take 2 tablets by mouth At Bedtime Hold if diarrhea occurs.    Constipation, chronic       triamcinolone 0.1 % cream    KENALOG    453.6 g    Apply sparingly to affected area on face three times daily.    Facial lesion       VITAMIN C PO           ZYRTEC ALLERGY 10 MG tablet   Generic drug:  cetirizine       Take 10 mg by mouth At Bedtime        * Notice:  This list has 3 medication(s) that are the same as other medications prescribed for you. Read the directions carefully, and ask your doctor or other care provider to review them with you.

## 2018-06-24 NOTE — TELEPHONE ENCOUNTER
Preliminary Device Interrogation Results.  Final physician signed paceart report to be scanned and attached.    Patient paged device RN to report that she woke up this morning and has been feeling very SOB and dizzy.  Patient is audibly SOB while talking on the telephone.  Patient states that she is wondering if she is back in AFib and has been trying to send a remote transmission. Monitor troubleshooting provided.  Remote pacemaker transmissions received and reviewed.  Device transmission sent per MD orders.  Patient has a Medtronic dual lead pacemaker.  Normal pacemaker function.  647 AT/AF episodes recorded - < 1 min - 7 hrs 32 min in duration.  It appears that the patient has been in AFib since 0316 this morning.  AF burden = 7.1%.  Patient is taking Eliquis.  Presenting EGM = AFib with VS/ @  bpm.  AP = 78.6%.   = 14.5%.  Estimated battery longevity to ZENAIDA = 8 years.  Patient notified of interrogation results.  Patient instructed to go to the ER for further evaluation.  Patient states that her  is home with her and her daughter is on the way over and she will have her take her to the ER.  Instructed patient to call device RN with further questions or concerns.    Remote pacemaker transmission

## 2018-06-28 ENCOUNTER — INFUSION THERAPY VISIT (OUTPATIENT)
Dept: INFUSION THERAPY | Facility: CLINIC | Age: 83
End: 2018-06-28
Payer: MEDICARE

## 2018-06-28 VITALS
BODY MASS INDEX: 28.42 KG/M2 | TEMPERATURE: 98.1 F | SYSTOLIC BLOOD PRESSURE: 120 MMHG | HEART RATE: 63 BPM | DIASTOLIC BLOOD PRESSURE: 70 MMHG | WEIGHT: 165.7 LBS | RESPIRATION RATE: 16 BRPM | OXYGEN SATURATION: 97 %

## 2018-06-28 DIAGNOSIS — M05.79 RHEUMATOID ARTHRITIS INVOLVING MULTIPLE SITES WITH POSITIVE RHEUMATOID FACTOR (H): Primary | ICD-10-CM

## 2018-06-28 PROCEDURE — 96365 THER/PROPH/DIAG IV INF INIT: CPT | Performed by: NURSE PRACTITIONER

## 2018-06-28 PROCEDURE — 99207 ZZC NO CHARGE NURSE ONLY: CPT

## 2018-06-28 RX ADMIN — Medication 250 ML: at 10:19

## 2018-06-28 ASSESSMENT — PAIN SCALES - GENERAL: PAINLEVEL: NO PAIN (0)

## 2018-06-28 NOTE — PROGRESS NOTES
"Infusion Nursing Note:  Linda Spicer presents today for Orencia.    Patient seen by provider today: No   present during visit today: Not Applicable.    Note: States she had an exacerbation of her a-fib with shortness of breath and went to the ER which resolved without intervention. She was not admitted. CXR was negative.    Intravenous Access:  Peripheral IV placed.    Treatment Conditions:  Rheumatology Infusion Checklist: PRIOR TO INFUSION OF BIOLOGICAL MEDICATIONS OR ANY OF THESE AS LISTED: Orencia (abatacept) \".rheumbiologicalchecklist\"    Prior to Infusion of biological medications or any of these as listed:    1. Elevated temperature, fever, chills, productive cough or abnormal vital signs, night sweats, coughing up blood or sputum, no appetite or abnormal vital signs : NO    2. Open wounds or new incisions: NO    3. Recent hospitalization: NO    4.  Recent surgeries:  NO    5. Any upcoming surgeries or dental procedures?:NO    6. Any current or recent bouts of illness or infection? On any antibiotics? : NO    7. Any new, sudden or worsening abdominal pain :NO    8. Vaccination within 4 weeks? Patient or someone in the household is scheduled to receive vaccination? No live virus vaccines prior to or during treatment :NO    9. Any nervous system diseases [i.e. multiple sclerosis, Guillain-Boise, seizures, neurological  changes]: NO    10. Pregnant or breast feeding; or plans on pregnancy in the future: NO    11. Signs of worsening depression or suicidal ideations while taking benlysta:NO    12. New-onset medical symptoms: NO    13.  New cancer diagnosis or on chemotherapy or radiation NO    14.  Evaluate for any sign of active TB [Unexplained weight loss, Loss of appetite, Night sweats, Fever, Fatigue, Chills, Coughing for 3 weeks or longer, Hemoptysis (coughing up blood), Chest pain]: NO    **Note: If answered yes to any of the above, hold the infusion and contact ordering rheumatologist or " on-call rheumatologist.   .      Post Infusion Assessment:  Patient tolerated infusion without incident.  Blood return noted pre and post infusion.  Site patent and intact, free from redness, edema or discomfort.  No evidence of extravasations.  Access discontinued per protocol.    Discharge Plan:   Patient will return 7/26/18 for next appointment.   Patient discharged in stable condition accompanied by: son.  Departure Mode: Ambulatory.    Michelle Guadalupe RN

## 2018-06-28 NOTE — MR AVS SNAPSHOT
After Visit Summary   6/28/2018    Linda Spicer    MRN: [id number]           Patient Information     Date Of Birth          6/13/1931        Visit Information        Provider Department      6/28/2018 9:30 AM [place] 5 INFUSION [facility]        Today's Diagnoses     Rheumatoid arthritis involving multiple sites with positive rheumatoid factor (H)    -  1       Follow-ups after your visit        Your next 10 appointments already scheduled     Jul 26, 2018  9:30 AM CDT   Level 1 with BAY 6 INFUSION   [facility] ([facility])    [address] [place] [address]   [phone]            Aug 20, 2018 12:30 PM CDT   Return Visit with Emeterio Loza MD   [facility] ([facility])    [address] [place] [address]   [phone]            Aug 23, 2018  9:00 AM CDT   Level 1 with [place] 8 INFUSION   [facility])    [address] [place] [address]   [phone]            Sep 05, 2018 10:00 AM CDT   Return Visit with DEVICE CHECK RN CARD   [facility] ([facility])    [address] [place] [address]   [phone]            Sep 11, 2018 10:00 AM CDT   Return Visit with Mario Davenport MD   [facility] ([facility])    [address] [facility] [address]   [phone]            Dec 11, 2018 10:00 AM CST   Office Visit with PFT LAB   [facility])    [address] [place] [address]   [phone]           Bring a current list of meds and any records pertaining to this visit. For Physicals, please bring immunization records and any forms needing to be filled out. Please arrive 10 minutes early to complete paperwork.            Dec 11, 2018 11:00 AM CST   Return Visit  with Dea Acosta MD   [facility]    [address] [place] [address] [phone]            Mar 01, 2019 10:00 AM CST   Return Visit with Asia Steven PA-C   [facility] ([facility])    [address] [place] [address] [phone]              Who to contact     If you have questions or need follow up information about today's clinic visit or your schedule please contact [facility] directly at [phone].  Normal or non-critical lab and imaging results will be communicated to you by [employer], letter or phone within 4 business days after the clinic has received the results. If you do not hear from us within 7 days, please contact the clinic through [employer] or phone. If you have a critical or abnormal lab result, we will notify you by phone as soon as possible.  Submit refill requests through [employer] or call your pharmacy and they will forward the refill request to us. Please allow 3 business days for your refill to be completed.          Additional Information About Your Visit        [employer] Information     [employer] gives you secure access to your electronic health record. If you see a primary care provider, you can also send messages to your care team and make appointments. If you have questions, please call your primary care clinic.  If you do not have a primary care provider, please call [phone] and they will assist you.      [employer] is an electronic gateway that provides easy, online access to your medical records. With [employer], you can request a clinic appointment, read your test results, renew a prescription or communicate with your care team.     To access your existing account, please contact your [facility] or call [phone] for assistance.        Care EveryWhere ID     This is your Care EveryWhere ID. This could be used  by other organizations to access your [place] medical records  [id number]        Your Vitals Were     Pulse Temperature Respirations Pulse Oximetry BMI (Body Mass Index)       63 98.1  F (36.7  C) (Oral) 16 97% 28.42 kg/m2        Blood Pressure from Last 3 Encounters:   06/28/18 120/70   06/01/18 123/59   05/30/18 150/59    Weight from Last 3 Encounters:   06/28/18 75.2 kg (165 lb 11.2 oz)   05/08/18 74.3 kg (163 lb 12.8 oz)   05/02/18 74.3 kg (163 lb 14.4 oz)              Today, you had the following     No orders found for display       Primary Care Provider Office Phone # Fax #    Tre Lockett [phone]       [address] [place]  [facility] [address]        Goals        General    I will complete my health care directive. (pt-stated)     Notes - Note created  3/29/2018  3:07 PM by Behl, Melissa K, RN    Supporting Steps:    Pt has attended  a health care  directive clas-  s, is awaiting  to see her law-  elizabeth to finalize  the document.  10/24/17 Pt  informed of CPR  vs intubation  and information  mailed out to  patient.           I will schedule an appointment with endocrinology. (pt-stated)       Equal Access to Services     [facility] SRAKAR AH: Hadii rakesh sanchezo Soomaali, waaxda luqadaha, qaybta kaalmada adeegyada, zahraa moss . So [facility] [phone].    ATENCIÓN: Si habla español, tiene a merchant disposición servicios gratuitos de asistencia lingüística. Llame al [phone].    We comply with applicable federal civil rights laws and [place] laws. We do not discriminate on the basis of race, color, national origin, age, disability, sex, sexual orientation, or gender identity.            Thank you!     Thank you for choosing [facility]  for your care. Our goal is always to provide you with excellent care. Hearing back from our patients is one way we can continue to improve our services. Please take a few minutes to complete the written survey  that you may receive in the mail after your visit with us. Thank you!             Your Updated Medication List - Protect others around you: Learn how to safely use, store and throw away your medicines at www.disposemymeds.org.          This list is accurate as of 6/28/18 11:05 AM.  Always use your most recent med list.                   Brand Name Dispense Instructions for use Diagnosis    albuterol (2.5 MG/3ML) 0.083% neb solution      Take 1 vial by nebulization every 6 hours as needed for shortness of breath / dyspnea or wheezing        apixaban ANTICOAGULANT 2.5 MG tablet    ELIQUIS    180 tablet    Take 1 tablet (2.5 mg) by mouth 2 times daily    Atrial flutter, paroxysmal (H)       azaTHIOprine 50 MG tablet    IMURAN    90 tablet    Take 1 tablet (50 mg) by mouth daily    Rheumatoid arthritis involving multiple sites with positive rheumatoid factor (H)       bismuth subsalicylate 262 MG chewable tablet    PEPTO BISMOL    100 tablet    Take 2 tablets (524 mg) by mouth 4 times daily as needed    Dyspepsia and disorder of function of stomach       Blood Pressure Monitor Kit     1 kit    1 kit daily as needed    CHF (congestive heart failure) (H)       bumetanide 1 MG tablet    BUMEX    30 tablet    Take 1 tablet (1 mg) by mouth every morning    Chronic diastolic congestive heart failure (H)       calcium-vitamin D 600-400 MG-UNIT per tablet    CALTRATE     Take 1 tablet by mouth 2 times daily        COMPOUNDED NON-CONTROLLED SUBSTANCE - PHARMACY TO MIX COMPOUNDED MEDICATION    CMPD RX    30 g    Estriol 1mg/gram. Place 1 gram vaginally daily for two weeks, then vaginally twice weekly after.    Atrophic vaginitis       Cranberry 180 MG Caps      Take 1 capsule by mouth daily Unsure of strength        fish oil-omega-3 fatty acids 1000 MG capsule      Take 2 g by mouth daily. 2 capsules daily        FLAGYL PO      Take 500 mg by mouth 2 times daily    Acute infectious diarrhea       folic acid 1 MG tablet    FOLVITE     90 tablet    Take 1 tablet (1 mg) by mouth daily    Oral aphthae       GENTEAL MILD OP      Apply  to eye daily.        hydrocortisone 2.5 % cream     30 g    Apply BID to affected region(s) for 7-10 days.    Rash       levothyroxine 75 MCG tablet    SYNTHROID/LEVOTHROID    90 tablet    TAKE ONE TABLET BY MOUTH EVERY DAY    Hypothyroidism, unspecified type       loratadine 10 MG tablet    CLARITIN     Take 10 mg by mouth every morning        LORazepam 1 MG tablet    ATIVAN    30 tablet    Take 1 tablet (1 mg) by mouth 2 times daily    Anxiety       losartan 50 MG tablet    COZAAR    90 tablet    Take 1 tablet (50 mg) by mouth daily    Heart failure with preserved ejection fraction, NYHA class I (H)       metoprolol tartrate 25 MG tablet    LOPRESSOR    60 tablet    Take 1 tablet (25 mg) by mouth 2 times daily    Atrial fibrillation, unspecified type (H), Hypertension goal BP (blood pressure) < 150/90       nitroGLYcerin 0.4 MG sublingual tablet    NITROSTAT    25 tablet    Place 1 tablet (0.4 mg) under the tongue every 5 minutes as needed for chest pain    Chest pain       * order for DME     1 Box    Equipment being ordered: Dispense face mask. Mrs. Spicer is immunosuppressed due to rheumatoid arthritis.    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 Use with Albuterol.    Hypoxia       order for DME     1 each    Equipment being ordered: Dispense baffle, for use with nebulizer.    Pneumonia of left upper lobe due to Mycoplasma pneumoniae       * order for DME     1 each    Dispense SP Walker-small    Pain of toe of right foot, Status post bunionectomy       PRESERVISION AREDS PO      Take 1 tablet by mouth daily        ranibizumab 0.3 MG/0.05ML Soln    LUCENTIS     0.3 mg by Intravitreal route        ranitidine 150 MG tablet    ZANTAC    60 tablet    Take 1 tablet (150 mg) by mouth 2 times daily     Gastroesophageal reflux disease without esophagitis       REFRESH P.M. Oint      Apply  to eye. Daily at bedtime        saccharomyces boulardii 250 MG capsule    FLORASTOR     Take 250 mg by mouth daily        senna-docusate 8.6-50 MG per tablet    SENOKOT-S;PERICOLACE    120 tablet    Take 2 tablets by mouth At Bedtime Hold if diarrhea occurs.    Constipation, chronic       triamcinolone 0.1 % cream    KENALOG    453.6 g    Apply sparingly to affected area on face three times daily.    Facial lesion       VITAMIN C PO           ZYRTEC ALLERGY 10 MG tablet   Generic drug:  cetirizine      Take 10 mg by mouth At Bedtime        * Notice:  This list has 3 medication(s) that are the same as other medications prescribed for you. Read the directions carefully, and ask your doctor or other care provider to review them with you.

## 2018-07-09 ENCOUNTER — TELEPHONE (OUTPATIENT)
Dept: FAMILY MEDICINE | Facility: CLINIC | Age: 83
End: 2018-07-09

## 2018-07-09 ENCOUNTER — PATIENT OUTREACH (OUTPATIENT)
Dept: CARE COORDINATION | Facility: CLINIC | Age: 83
End: 2018-07-09

## 2018-07-09 NOTE — TELEPHONE ENCOUNTER
Reason for Call:  Home Health Care    Amaris with [facility] called regarding (reason for call):     Orders are needed for this patient.     Skilled Nursing: Once weekly nursing visits for 8 weeks for Skilled Nursing plus two PRN visits for falls and acute care needs.      Pt Provider: Dr. Toledo Vocal    Phone Number Homecare Nurse can be reached at: [phone]    Can we leave a detailed message on this number? YES    Best Time: anytime    Call taken on 7/9/2018 at 9:59 AM by Siva Swanson

## 2018-07-09 NOTE — PROGRESS NOTES
Clinic Care Coordination Contact  Care Coordination Communication    Referral Source: Self-patient/Caregiver    Clinical Data: Patient was hospitalized at [facility] from 7/2/18 to 7/5/18 with diagnosis of fall, closed zone III fracture of sacrum.     Home Care Contact:              Home Care Agency: [facility]              Contact name () and phone number: Filomena May [phone]              Care Coordination contacted home care: Yes              Anticipated start of care date: Patient has been opened    Patient Contact:               Introduced self and role of care coordination.               Discharge instructions were reviewed with patient/caregiver.               Do you have any questions about your medications? no              Follow up appointment is scheduled for n/a.              Provided 24 Hour Nurse Line and/or 24 Hour Appointment Scheduling: Yes              Home care has contacted patient: Yes              Patient questions/concerns: none    Plan: RN/SW Care Coordinator will await notification from home care staff informing RN/SW Care Coordinator of patients discharge plans/needs. RN/SW Care Coordinator will review chart and outreach to home care every 4 weeks and as needed.      Melissa Behl BSN, RN, PHN  [facility] Care Coordinator  [phone]

## 2018-07-09 NOTE — TELEPHONE ENCOUNTER
..Reason for Call:    orders      Detailed comments: 2 x week x 3 weeks for shower training, kitchen mgmt training, walker use and DME education    Phone Number Patient can be reached at: Other phone number:  [phone]    Best Time: anytime      Can we leave a detailed message on this number? YES    Call taken on 7/9/2018 at 12:35 PM by Radha Martins

## 2018-07-09 NOTE — LETTER
Health Care Home - Access Care Plan    About Me  Patient Name:  Linda Walter    YOB: 1931  Age:                             87 year old   [place] MRN:            [id number] Telephone Information:     Home Phone [phone]   Mobile [phone]       Address:    [address]  [facility] [address] Email address:  [email or web address]      Emergency Contact(s)  Name Relationship Lgl Grd Work Phone Home Phone Mobile Phone   1. JENNIFER GAONA Daughter No NONE NONE [phone]   2. LEA WALTER Spouse No none [phone]   3. KAROLINA WALTER Son No NONE NONE [phone]   4. CAITY WALTER Son No none [phone]             Health Maintenance: Routine Health maintenance Reviewed: Due/Overdue   Health Maintenance Due   Topic Date Due     TETANUS IMMUNIZATION (SYSTEM ASSIGNED)  07/01/2018     BMP Q6 MOS  07/22/2018        My Access Plan  Medical Emergency 911   Questions or concerns during clinic hours Primary Clinic Line, I will call the clinic directly: [facility] - [phone]   24 Hour Appointment Line [phone] or  [phone] [phone] ([phone]) (toll free)   24 Hour Nurse Line [phone] (toll free)   Questions or concerns outside clinic hours 24 Hour Appointment Line, I will call the after-hours on-call line:   [facility] [phone] or [phone]) (toll-free)   Preferred Urgent Care [facility], [phone]   Preferred Hospital [facility]  [phone]   Preferred Pharmacy [facility] [place] [place] - [address]     Behavioral Health Crisis Line The National Suicide Prevention Lifeline at 1-910.550.4601 or 911     My Care Team Members  Patient Care Team       Relationship Specialty Notifications Start End    Vocal, Tre Alvarado MD PCP - General Internal Medicine  12/14/15     Phone: [phone] Fax: [phone]          [address] [facility] [address]    Joey Ovalle MD MD Family Medicine - Sports Medicine  9/10/15     Phone: [phone] Fax: [phone]         [facility] [facility] [address] [facility] [address]    Behl, Melissa K, RN Clinic Care Coordinator Primary Care - CC Admissions 7/9/18     Phone: [phone] Fax: [phone]               My Medical and Care Information  Problem List   Patient Active Problem List   Diagnosis     ACP (advance care planning)     Hypertension goal BP (blood pressure) < 150/90     Hypothyroidism     Macular degeneration, left eye     Irritable bowel syndrome     Encounter for palliative care     Adjustment disorder with anxious mood     Mild anemia     DDD (degenerative disc disease), lumbar     CKD (chronic kidney disease) stage 3, GFR 30-59 ml/min     History of blood transfusion     Aftercare following surgery     S/P lumbar laminectomy     High risk medication use     Age-related osteoporosis without current pathological fracture     Atrophic vaginitis     Fecal incontinence     Female stress incontinence     Impingement syndrome of both shoulders     UIP (usual interstitial pneumonitis) (H)     High risk medications (not anticoagulants) long-term use     Heart failure with preserved ejection fraction (H)     Congestive heart failure with preserved LV function, NYHA class 3 (H)     Other specified hypothyroidism     Rheumatoid arthritis involving multiple sites with positive rheumatoid factor (H)     Health Care Home     Sick sinus syndrome (H)     Cardiac pacemaker - Medtronic dual lead pacemaker - Not Dependent - MRI Safe     Immunosuppression (H)     ILD (interstitial lung disease) (H)     Heart failure with preserved ejection fraction, NYHA class I (H)      Current Medications and Allergies:  See printed Medication Report

## 2018-07-09 NOTE — TELEPHONE ENCOUNTER
Notified Ayesha knight for home care orders per [facility] protocol.     Carmenza Miller RN, BSN

## 2018-07-09 NOTE — TELEPHONE ENCOUNTER
This writer attempted to contact Amaris at [facility] [employer] on 07/09/18      Reason for call Pneumonia injection dates and left detailed message.      If patient calls back:   Patient contacted by 1st floor [facility] Care Team (MA/TC). Inform patient that someone from the team will contact them, document that pt called and route to care team.         Usama Ching

## 2018-07-09 NOTE — TELEPHONE ENCOUNTER
Reason for Call:  Other call back    Detailed comments: Pt's RN calling for a verbal on  the dates of  Her Pneumococcal vaccines that were taken.    Phone Number RN can be reached at: Other phone number:  [phone]    Best Time: anytime    Can we leave a detailed message on this number? YES    Call taken on 7/9/2018 at 10:01 AM by Siva Swanson

## 2018-07-09 NOTE — TELEPHONE ENCOUNTER
Ayesha from [facility] needs verbal orders for physical therapy with patient.    2x week for 6 weeks to improve strength, balance, endurance, pain management.    Orders asap.    Ok to leave message on phone.    Thank you.

## 2018-07-09 NOTE — TELEPHONE ENCOUNTER
Notified Amaris knight for home care orders per [facility] protocol.     Carmenza Miller RN, BSN

## 2018-07-09 NOTE — TELEPHONE ENCOUNTER
Notified Josephine knight for home care orders per [facility] protocol for both PT and OT via detailed voice message.    Carmenza Miller RN, BSN

## 2018-07-12 ENCOUNTER — RADIANT APPOINTMENT (OUTPATIENT)
Dept: GENERAL RADIOLOGY | Facility: CLINIC | Age: 83
End: 2018-07-12
Attending: INTERNAL MEDICINE
Payer: MEDICARE

## 2018-07-12 ENCOUNTER — OFFICE VISIT (OUTPATIENT)
Dept: FAMILY MEDICINE | Facility: CLINIC | Age: 83
End: 2018-07-12
Payer: MEDICARE

## 2018-07-12 ENCOUNTER — PATIENT OUTREACH (OUTPATIENT)
Dept: CARE COORDINATION | Facility: CLINIC | Age: 83
End: 2018-07-12

## 2018-07-12 VITALS
DIASTOLIC BLOOD PRESSURE: 76 MMHG | WEIGHT: 165.4 LBS | HEIGHT: 64 IN | SYSTOLIC BLOOD PRESSURE: 169 MMHG | OXYGEN SATURATION: 97 % | TEMPERATURE: 97.5 F | BODY MASS INDEX: 28.24 KG/M2 | HEART RATE: 62 BPM

## 2018-07-12 DIAGNOSIS — S32.10XD CLOSED FRACTURE OF SACRUM WITH ROUTINE HEALING, UNSPECIFIED FRACTURE MORPHOLOGY, SUBSEQUENT ENCOUNTER: ICD-10-CM

## 2018-07-12 DIAGNOSIS — R10.2 PELVIC PAIN IN FEMALE: ICD-10-CM
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
## 2018-07-12 PROCEDURE — 99214 OFFICE O/P EST MOD 30 MIN: CPT | Performed by: INTERNAL MEDICINE

## 2018-07-12 PROCEDURE — 72170 X-RAY EXAM OF PELVIS: CPT | Mod: FY

## 2018-07-12 PROCEDURE — 72220 X-RAY EXAM SACRUM TAILBONE: CPT | Mod: FY

## 2018-07-12 RX ORDER — PROPAFENONE HYDROCHLORIDE 150 MG/1
150 TABLET, COATED ORAL
COMMUNITY
Start: 2018-06-26 | End: 2018-07-30

## 2018-07-12 RX ORDER — METHOCARBAMOL 500 MG/1
250 TABLET, FILM COATED ORAL
COMMUNITY
Start: 2018-07-05 | End: 2018-08-20

## 2018-07-12 RX ORDER — OXYCODONE HYDROCHLORIDE 5 MG/1
2.5 TABLET ORAL
COMMUNITY
Start: 2018-07-05 | End: 2018-08-20

## 2018-07-12 NOTE — PATIENT INSTRUCTIONS
At [facility], we strive to deliver an exceptional experience to you, every time we see you.  If you receive a survey in the mail, please send us back your thoughts. We really do value your feedback.    Based on your medical history, these are the current health maintenance/preventive care services that you are due for (some may have been done at this visit.)  Health Maintenance Due   Topic Date Due     BMP Q6 MOS  07/22/2018       Suggested websites for health information:  Www.[email or web address] : Up to date and easily searchable information on multiple topics.  Www.medlineplus.gov : medication info, interactive tutorials, watch real surgeries online  Www.familydoctor.org : good info from the Academy of Family Physicians  Www.cdc.gov : public health info, travel advisories, epidemics (H1N1)  Www.aap.org : children's health info, normal development, vaccinations  Www.health.[facility].[place].us : [place] dept of health, public health issues in [place], N1N1    Your care team:                            Family Medicine Internal Medicine   MD Tre Hicks MD Shantel Branch-Fleming, MD Katya Georgiev PA-C Megan Hill, APRN CNP    Obi Sharma MD Pediatrics   Benja Lancaster, PA-C  Sary Caballero, CNP MD Mariann Vogel APRN CNP   MD Maritza Condon MD Deborah Mielke, MD Kim Thein, APRN CNP      Clinic hours: Monday - Thursday 7 am-7 pm; Fridays 7 am-5 pm.   Urgent care: Monday - Friday 11 am-9 pm; Saturday and Sunday 9 am-5 pm.  Pharmacy : Monday -Thursday 8 am-8 pm; Friday 8 am-6 pm; Saturday and Sunday 9 am-5 pm.     Clinic: [phone]   Pharmacy: [phone]

## 2018-07-12 NOTE — PROGRESS NOTES
SUBJECTIVE:   Linda Spicer is a 87 year old female who presents to clinic today for the following health issues:    ED/UC Followup:    Facility:  [facility]  Date of visit: 7/2/18  Reason for visit: Fall  Current Status: Slight improvement but remains sore on hips, legs and lower back  Description: Fell backwards while exercising, landing on her buttock.  Evaluation: CT of pelvis shows anterior S3 buckle fracture  Alleviating factor: standing or loading pressure on pelvis; rarely takes opiate medication given at ER  Predisposing factor: osteoporosis (DEXA scan last 6/26/17 shows T-score at left femoral neck of -2.7)  Aggravating factor: novel anticoagulant use (Eliquis) for paroxysmal atrial fibrillation  Complication: none     Problem list and histories reviewed & adjusted, as indicated.  Additional history: as documented    Patient Active Problem List   Diagnosis     ACP (advance care planning)     Hypertension goal BP (blood pressure) < 150/90     Hypothyroidism     Macular degeneration, left eye     Irritable bowel syndrome     Encounter for palliative care     Adjustment disorder with anxious mood     Mild anemia     DDD (degenerative disc disease), lumbar     CKD (chronic kidney disease) stage 3, GFR 30-59 ml/min     History of blood transfusion     Aftercare following surgery     S/P lumbar laminectomy     High risk medication use     Age-related osteoporosis without current pathological fracture     Atrophic vaginitis     Fecal incontinence     Female stress incontinence     Impingement syndrome of both shoulders     UIP (usual interstitial pneumonitis) (H)     High risk medications (not anticoagulants) long-term use     Heart failure with preserved ejection fraction (H)     Congestive heart failure with preserved LV function, NYHA class 3 (H)     Other specified hypothyroidism     Rheumatoid arthritis involving multiple sites with positive rheumatoid factor (H)     Health Care Home     Sick sinus  syndrome (H)     Cardiac pacemaker - Medtronic dual lead pacemaker - Not Dependent - MRI Safe     Immunosuppression (H)     ILD (interstitial lung disease) (H)     Heart failure with preserved ejection fraction, NYHA class I (H)     Past Surgical History:   Procedure Laterality Date     APPENDECTOMY       BIOPSY      hemorrhoidectomy     ENT SURGERY      tonsillectomy     GYN SURGERY      3 D & C's     HYSTERECTOMY, PAP NO LONGER INDICATED       LAMINECTOMY LUMBAR ONE LEVEL N/A 10/13/2015    Procedure: LAMINECTOMY LUMBAR ONE LEVEL;  Surgeon: Fransico Toussaint MD;  Location:  OR       Social History   Substance Use Topics     Smoking status: Never Smoker     Smokeless tobacco: Never Used     Alcohol use Yes      Comment: rare wine      Family History   Problem Relation Age of Onset     Hypertension Mother      Psychotic Disorder Father      Diabetes Son      Diabetes Daughter      Blood Disease Daughter          Current Outpatient Prescriptions   Medication Sig Dispense Refill     albuterol (2.5 MG/3ML) 0.083% neb solution Take 1 vial by nebulization every 6 hours as needed for shortness of breath / dyspnea or wheezing       apixaban ANTICOAGULANT (ELIQUIS) 2.5 MG tablet Take 1 tablet (2.5 mg) by mouth 2 times daily 180 tablet 3     Artificial Tear Ointment (REFRESH P.M.) OINT Apply  to eye. Daily at bedtime          Ascorbic Acid (VITAMIN C PO)        azaTHIOprine (IMURAN) 50 MG tablet Take 1 tablet (50 mg) by mouth daily 90 tablet 2     bismuth subsalicylate (PEPTO BISMOL) 262 MG chewable tablet Take 2 tablets (524 mg) by mouth 4 times daily as needed 100 tablet 11     Blood Pressure Monitor KIT 1 kit daily as needed 1 kit 0     bumetanide (BUMEX) 1 MG tablet Take 1 tablet (1 mg) by mouth every morning 30 tablet 11     calcium-vitamin D (CALTRATE) 600-400 MG-UNIT per tablet Take 1 tablet by mouth 2 times daily        cetirizine (ZYRTEC ALLERGY) 10 MG tablet Take 10 mg by mouth At Bedtime       COMPOUNDED  NON-CONTROLLED SUBSTANCE (CMPD RX) - PHARMACY TO MIX COMPOUNDED MEDICATION Estriol 1mg/gram. Place 1 gram vaginally daily for two weeks, then vaginally twice weekly after. 30 g 6     Cranberry 180 MG CAPS Take 1 capsule by mouth daily Unsure of strength       fish oil-omega-3 fatty acids (FISH OIL) 1000 MG capsule Take 2 g by mouth daily. 2 capsules daily            folic acid (FOLVITE) 1 MG tablet Take 1 tablet (1 mg) by mouth daily 90 tablet 3     hydrocortisone 2.5 % cream Apply BID to affected region(s) for 7-10 days. 30 g 0     Hypromellose (GENTEAL MILD OP) Apply  to eye daily.       levothyroxine (SYNTHROID/LEVOTHROID) 75 MCG tablet TAKE ONE TABLET BY MOUTH EVERY DAY 90 tablet 2     loratadine (CLARITIN) 10 MG tablet Take 10 mg by mouth every morning       LORazepam (ATIVAN) 1 MG tablet Take 1 tablet (1 mg) by mouth 2 times daily 30 tablet 0     losartan (COZAAR) 50 MG tablet Take 1 tablet (50 mg) by mouth daily 90 tablet 1     methocarbamol (ROBAXIN) 500 MG tablet Take 250 mg by mouth       metoprolol (LOPRESSOR) 25 MG tablet Take 1 tablet (25 mg) by mouth 2 times daily 60 tablet 5     MetroNIDAZOLE (FLAGYL PO) Take 500 mg by mouth 2 times daily        Multiple Vitamins-Minerals (PRESERVISION AREDS PO) Take 1 tablet by mouth daily        nitroglycerin (NITROSTAT) 0.4 MG SL tablet Place 1 tablet (0.4 mg) under the tongue every 5 minutes as needed for chest pain 25 tablet 0     order for DME Dispense SP Walker-[place] 1 each 0     order for DME Equipment being ordered: Dispense baffle, for use with nebulizer. 1 each 0     order for DME Equipment being ordered: Nebulizer. Use with Albuterol. 1 each 0     order for DME Equipment being ordered: Dispense face mask.  Mrs. Spicer is immunosuppressed due to rheumatoid arthritis. 1 Box 11     oxyCODONE IR (ROXICODONE) 5 MG tablet Take 2.5 mg by mouth       propafenone (RYTHMOL) 150 MG TABS tablet Take 150 mg by mouth       ranibizumab (LUCENTIS) 0.3 MG/0.05ML SOLN 0.3 mg  by Intravitreal route       ranitidine (ZANTAC) 150 MG tablet Take 1 tablet (150 mg) by mouth 2 times daily 60 tablet 5     saccharomyces boulardii (FLORASTOR) 250 MG capsule Take 250 mg by mouth daily       senna-docusate (SENOKOT-S;PERICOLACE) 8.6-50 MG per tablet Take 2 tablets by mouth At Bedtime Hold if diarrhea occurs. 120 tablet 11     triamcinolone (KENALOG) 0.1 % cream Apply sparingly to affected area on face three times daily. 453.6 g 5     Allergies   Allergen Reactions     Cephalexin Hcl Diarrhea     Gabapentin Other (See Comments)     Dizzsiness     Naproxen GI Disturbance     Perfume      Lactase Other (See Comments)     Macrobid [Nitrofurantoin Anhydrous]      Possibly related to lung disease      Seasonal Allergies      Sulfa Drugs      Throat swelling     Xanax [Alprazolam] Other (See Comments)     Dizziness      Recent Labs   Lab Test  05/02/18   1050  04/04/18   1510  02/07/18   1008  01/22/18   1018 01/15/18  12/07/17   1157  11/25/17   1007   08/30/17   1214   06/03/16   0756   05/22/14   0821   LDL   --    --    --    --    --    --    --    --   76   --   109*   --   97   HDL   --    --    --    --    --    --    --    --   50   --   47*   --   42*   TRIG   --    --    --    --    --    --    --    --   101   --   75   --   84   ALT  28   --   35   --    --    --   32   < >   --    < >  36   < >   --    CR  1.36*   --   1.43*  1.26*  1.59*  1.26*  1.60*   < >   --    < >  1.44*   < >  1.21*   GFRESTIMATED  37*   --   35*  40*  31*  40*  31*   < >   --    < >  35*   < >  43*   GFRESTBLACK  45*   --   42*  49*  37*  49*  37*   < >   --    < >  42*   < >  52*   POTASSIUM   --    --    --   4.3  4.8   --   5.0   < >   --    < >  5.1   < >  4.1   TSH   --   0.53   --    --    --   0.89   --    --   0.77   < >   --    < >  0.58    < > = values in this interval not displayed.      BP Readings from Last 3 Encounters:   07/12/18 169/76   06/28/18 120/70   06/01/18 123/59    Wt Readings from Last 3  "Encounters:   07/12/18 165 lb 6.4 oz (75 kg)   06/28/18 165 lb 11.2 oz (75.2 kg)   05/08/18 163 lb 12.8 oz (74.3 kg)                 ROS:  CONSTITUTIONAL: NEGATIVE for fever, chills, change in weight  INTEGUMENTARY/SKIN: NEGATIVE for worrisome rashes, moles or lesions  EYES: NEGATIVE for vision changes or irritation  ENT/MOUTH: NEGATIVE for ear, mouth and throat problems  RESP: NEGATIVE for significant cough or SOB  CV: As above.  GI: NEGATIVE for nausea, abdominal pain, heartburn, or change in bowel habits  : NEGATIVE for frequency, dysuria, or hematuria  MUSCULOSKELETAL:As above.  NEURO: NEGATIVE for weakness, dizziness or paresthesias  ENDOCRINE: NEGATIVE for temperature intolerance, skin/hair changes  HEME: NEGATIVE for bleeding problems  PSYCHIATRIC: NEGATIVE for changes in mood or affect    OBJECTIVE:     /76 (BP Location: Right arm, Patient Position: Chair, Cuff Size: Adult Regular)  Pulse 62  Temp 97.5  F (36.4  C) (Oral)  Ht 5' 4.02\" (1.626 m)  Wt 165 lb 6.4 oz (75 kg)  SpO2 97%  BMI 28.37 kg/m2  Body mass index is 28.37 kg/(m^2).  GENERAL: alert, in mild distress, elderly and fatigued  EYES: Eyes grossly normal to inspection and conjunctivae and sclerae normal  HENT: normal cephalic/atraumatic and oral mucous membranes moist  RESP: lungs clear to auscultation - no rales, rhonchi or wheezes  CV: regular rate and rhythm, normal S1 S2, no S3 or S4, no murmur, click or rub, no peripheral edema and peripheral pulses strong  MS: Tenderness on palpation of both trochanters, pubic bone and sacrum.  SKIN: no suspicious lesions or rashes  NEURO: Normal strength and tone, mentation intact and speech normal  PSYCH: mentation appears normal, affect normal/bright    Diagnostic Test Results:  Pending.    ASSESSMENT/PLAN:     (W19.XXXD) Fall, subsequent encounter  (primary encounter diagnosis)  Comment: No precipitating factor other than accidental fall while exercising.  Plan: XR Pelvis 1/2 Views, XR Sacrum " and Coccyx 2         Views, CT Pelvis w/o Contrast            (R10.2) Pelvic pain in female  Comment: Most likely due to contusion as patient takes Eliquis.  Plan: XR Pelvis 1/2 Views, CT Pelvis w/o Contrast            (S32.10XD) Closed fracture of sacrum with routine healing, unspecified fracture morphology, subsequent encounter  Comment: Evident from CT of pelvis, not plain x-rays upon review of ER visit. Repeat CT of pelvis on 8/2/2018. Off-loading is key, hence, patient encouraged to use donut/seat cushion.  Plan: XR Sacrum and Coccyx 2 Views, CT Pelvis w/o         Contrast            Follow-up visit if condition worsens.    Tre Lockett MD  [facility]

## 2018-07-12 NOTE — PROGRESS NOTES
Clinic Care Coordination Contact  Care Team Conversations    RN CC met with patient in clinic to go over different consent forms: Consent to Communicate, Authorization for Electronic Communication and MyChart Proxy forms.    Forms completed with patient.  Patient remains in home care.    Plan: RN Care Coordinator will await notification from home care staff informing RN Care Coordinator of patients discharge plans/needs. RN Care Coordinator will review chart and outreach to home care every 4 weeks and will remain available to patient, care team and home care as needed.       Melissa Behl BSN, RN, PHN  [facility] Care Coordinator  [phone]

## 2018-07-12 NOTE — MR AVS SNAPSHOT
After Visit Summary   7/12/2018    Linda Spicer    MRN: [id number]           Patient Information     Date Of Birth          6/13/1931        Visit Information        Provider Department      7/12/2018 3:00 PM Tre Lockett MD [facility]        Today's Diagnoses     Fall, subsequent encounter    -  1    Pelvic pain in female        Closed fracture of sacrum with routine healing, unspecified fracture morphology, subsequent encounter          Care Instructions    At [facility], we strive to deliver an exceptional experience to you, every time we see you.  If you receive a survey in the mail, please send us back your thoughts. We really do value your feedback.    Based on your medical history, these are the current health maintenance/preventive care services that you are due for (some may have been done at this visit.)  Health Maintenance Due   Topic Date Due     BMP Q6 MOS  07/22/2018       Suggested websites for health information:  Www.Tapdaq.mPowa : Up to date and easily searchable information on multiple topics.  Www.medlineplus.gov : medication info, interactive tutorials, watch real surgeries online  Www.familydoctor.org : good info from the Academy of Family Physicians  Www.cdc.gov : public health info, travel advisories, epidemics (H1N1)  Www.aap.org : children's health info, normal development, vaccinations  Www.health.state.[place] : [place] dept of health, public health issues in [place], N1N1    Your care team:                            Family Medicine Internal Medicine   MD Tre Hicks MD Shantel Branch-Fleming, MD Katya Georgiev PA-C Megan Hill, SUNNI Sharma MD Pediatrics   ABY Snyder, MD Mariann Chaney APRN CNP   MD Maritza Condon MD Deborah Mielke, MD Kim Thein, APRN [facility]      Clinic hours: Monday - Thursday 7 am-7 pm; Fridays 7 am-5 pm.   Urgent care:  Monday - Friday 11 am-9 pm; Saturday and Sunday 9 am-5 pm.  Pharmacy : Monday -Thursday 8 am-8 pm; Friday 8 am-6 pm; Saturday and Sunday 9 am-5 pm.     Clinic: [phone]   Pharmacy: [phone]              Follow-ups after your visit        Your next 10 appointments already scheduled     Jul 26, 2018  9:30 AM CDT   Level 1 with BAY 6 INFUSION   [facility]    [address] [place] [id number] [phone]            Aug 20, 2018 12:30 PM CDT   Return Visit with Emeterio Loza MD   [facility])    [address] [place] [id number] [phone]            Aug 23, 2018  9:00 AM CDT   Level 1 with BAY 8 INFUSION   [facility])    [address] [place] [id number] [phone]            Sep 05, 2018 10:00 AM CDT   Return Visit with DEVICE CHECK RN CARD   [facility])    [address] [place] [id number] [phone]            Sep 11, 2018 10:00 AM CDT   Return Visit with Mario Davenport MD   [facility] ([facility])    [address] [facility] [id number] [phone]            Dec 11, 2018 10:00 AM CST   Office Visit with PFT LAB   [facility])    [address] [place] [id number] [phone]           Bring a current list of meds and any records pertaining to this visit. For Physicals, please bring immunization records and any forms needing to be filled out. Please arrive 10 minutes early to complete paperwork.            Dec 11, 2018 11:00 AM CST   Return Visit with Dea Acosta MD   [facility])    [address] [place] [id number]   [phone]            Mar 01, 2019 10:00  "AM CST   Return Visit with Asia Steven PA-C   [facility] ([facility])    [address] [place] [id number] [phone]              Future tests that were ordered for you today     Open Future Orders        Priority Expected Expires Ordered    CT Pelvis w/o Contrast Routine 8/2/2018 7/12/2019 7/12/2018            Who to contact     If you have questions or need follow up information about today's clinic visit or your schedule please contact [facility] directly at [phone].  Normal or non-critical lab and imaging results will be communicated to you by Glass[employer], letter or phone within 4 business days after the clinic has received the results. If you do not hear from us within 7 days, please contact the clinic through [employer] or phone. If you have a critical or abnormal lab result, we will notify you by phone as soon as possible.  Submit refill requests through [employer] or call your pharmacy and they will forward the refill request to us. Please allow 3 business days for your refill to be completed.          Additional Information About Your Visit        Gl[employer] Information     [employer] gives you secure access to your electronic health record. If you see a primary care provider, you can also send messages to your care team and make appointments. If you have questions, please call your primary care clinic.  If you do not have a primary care provider, please call [phone] and they will assist you.        Care EveryWhere ID     This is your Care EveryWhere ID. This could be used by other organizations to access your [facility] medical records  [id number]        Your Vitals Were     Pulse Temperature Height Pulse Oximetry BMI (Body Mass Index)       62 97.5  F (36.4  C) (Oral) 5' 4.02\" (1.626 m) 97% 28.37 kg/m2        Blood Pressure from Last 3 Encounters:   07/12/18 169/76   06/28/18 120/70   06/01/18 123/59    Weight from Last 3 " Encounters:   07/12/18 165 lb 6.4 oz (75 kg)   06/28/18 165 lb 11.2 oz (75.2 kg)   05/08/18 163 lb 12.8 oz (74.3 kg)              We Performed the Following     XR Pelvis 1/2 Views     XR Sacrum and Coccyx 2 Views       Information about OPIOIDS     PRESCRIPTION OPIOIDS: WHAT YOU NEED TO KNOW   We gave you an opioid (narcotic) pain medicine. It is important to manage your pain, but opioids are not always the best choice. You should first try all the other options your care team gave you. Take this medicine for as short a time (and as few doses) as possible.     These medicines have risks:    DO NOT drive when on new or higher doses of pain medicine. These medicines can affect your alertness and reaction times, and you could be arrested for driving under the influence (DUI). If you need to use opioids long-term, talk to your care team about driving.    DO NOT operate heave machinery    DO NOT do any other dangerous activities while taking these medicines.     DO NOT drink any alcohol while taking these medicines.      If the opioid prescribed includes acetaminophen, DO NOT take with any other medicines that contain acetaminophen. Read all labels carefully. Look for the word  acetaminophen  or  Tylenol.  Ask your pharmacist if you have questions or are unsure.    You can get addicted to pain medicines, especially if you have a history of addiction (chemical, alcohol or substance dependence). Talk to your care team about ways to reduce this risk.    Store your pills in a secure place, locked if possible. We will not replace any lost or stolen medicine. If you don t finish your medicine, please throw away (dispose) as directed by your pharmacist. The [place] Pollution Control Agency has more information about safe disposal: https://www.pca.state.[place].us/living-green/managing-unwanted-medications.     All opioids tend to cause constipation. Drink plenty of water and eat foods that have a lot of fiber, such as fruits,  vegetables, prune juice, apple juice and high-fiber cereal. Take a laxative (Miralax, milk of magnesia, Colace, Senna) if you don t move your bowels at least every other day.          Primary Care Provider Office Phone # Fax #    Tre Lockett [phone]       [address]  [facility] [address]        Goals        General    I will complete my health care directive. (pt-stated)     Notes - Note created  3/29/2018  3:07 PM by Behl, Melissa K, RN    Supporting Steps:    Pt has attended  a health care  directive clas-  s, is awaiting  to see her law-  elizabeth to finalize  the document.  10/24/17 Pt  informed of CPR  vs intubation  and information  mailed out to  patient.             Equal Access to Services     [facility] : Paul gallegos Sojordin, waaxda luqadaha, qaybta kaalmada adeegyada, zahraa moss . So [facility] [phone].    ATENCIÓN: Si habla español, tiene a merchant disposición servicios gratuitos de asistencia lingüística. Llame al [phone].    We comply with applicable federal civil rights laws and [place] laws. We do not discriminate on the basis of race, color, national origin, age, disability, sex, sexual orientation, or gender identity.            Thank you!     Thank you for choosing [facility]  for your care. Our goal is always to provide you with excellent care. Hearing back from our patients is one way we can continue to improve our services. Please take a few minutes to complete the written survey that you may receive in the mail after your visit with us. Thank you!             Your Updated Medication List - Protect others around you: Learn how to safely use, store and throw away your medicines at www.disposemymeds.org.          This list is accurate as of 7/12/18  4:17 PM.  Always use your most recent med list.                   Brand Name Dispense Instructions for use Diagnosis    albuterol (2.5 MG/3ML) 0.083% neb  solution      Take 1 vial by nebulization every 6 hours as needed for shortness of breath / dyspnea or wheezing        apixaban ANTICOAGULANT 2.5 MG tablet    ELIQUIS    180 tablet    Take 1 tablet (2.5 mg) by mouth 2 times daily    Atrial flutter, paroxysmal (H)       azaTHIOprine 50 MG tablet    IMURAN    90 tablet    Take 1 tablet (50 mg) by mouth daily    Rheumatoid arthritis involving multiple sites with positive rheumatoid factor (H)       bismuth subsalicylate 262 MG chewable tablet    PEPTO BISMOL    100 tablet    Take 2 tablets (524 mg) by mouth 4 times daily as needed    Dyspepsia and disorder of function of stomach       Blood Pressure Monitor Kit     1 kit    1 kit daily as needed    CHF (congestive heart failure) (H)       bumetanide 1 MG tablet    BUMEX    30 tablet    Take 1 tablet (1 mg) by mouth every morning    Chronic diastolic congestive heart failure (H)       calcium-vitamin D 600-400 MG-UNIT per tablet    CALTRATE     Take 1 tablet by mouth 2 times daily        COMPOUNDED NON-CONTROLLED SUBSTANCE - PHARMACY TO MIX COMPOUNDED MEDICATION    CMPD RX    30 g    Estriol 1mg/gram. Place 1 gram vaginally daily for two weeks, then vaginally twice weekly after.    Atrophic vaginitis       Cranberry 180 MG Caps      Take 1 capsule by mouth daily Unsure of strength        fish oil-omega-3 fatty acids 1000 MG capsule      Take 2 g by mouth daily. 2 capsules daily        FLAGYL PO      Take 500 mg by mouth 2 times daily    Acute infectious diarrhea       folic acid 1 MG tablet    FOLVITE    90 tablet    Take 1 tablet (1 mg) by mouth daily    Oral aphthae       GENTEAL MILD OP      Apply  to eye daily.        hydrocortisone 2.5 % cream     30 g    Apply BID to affected region(s) for 7-10 days.    Rash       levothyroxine 75 MCG tablet    SYNTHROID/LEVOTHROID    90 tablet    TAKE ONE TABLET BY MOUTH EVERY DAY    Hypothyroidism, unspecified type       loratadine 10 MG tablet    CLARITIN     Take 10 mg by  mouth every morning        LORazepam 1 MG tablet    ATIVAN    30 tablet    Take 1 tablet (1 mg) by mouth 2 times daily    Anxiety       losartan 50 MG tablet    COZAAR    90 tablet    Take 1 tablet (50 mg) by mouth daily    Heart failure with preserved ejection fraction, NYHA class I (H)       methocarbamol 500 MG tablet    ROBAXIN     Take 250 mg by mouth        metoprolol tartrate 25 MG tablet    LOPRESSOR    60 tablet    Take 1 tablet (25 mg) by mouth 2 times daily    Atrial fibrillation, unspecified type (H), Hypertension goal BP (blood pressure) < 150/90       nitroGLYcerin 0.4 MG sublingual tablet    NITROSTAT    25 tablet    Place 1 tablet (0.4 mg) under the tongue every 5 minutes as needed for chest pain    Chest pain       * order for DME     1 Box    Equipment being ordered: Dispense face mask. Mrs. Spicer is immunosuppressed due to rheumatoid arthritis.    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 Use with Albuterol.    Hypoxia       order for DME     1 each    Equipment being ordered: Dispense baffle, for use with nebulizer.    Pneumonia of left upper lobe due to Mycoplasma pneumoniae       * order for DME     1 each    Dispense SP Walker-small    Pain of toe of right foot, Status post bunionectomy       oxyCODONE IR 5 MG tablet    ROXICODONE     Take 2.5 mg by mouth        PRESERVISION AREDS PO      Take 1 tablet by mouth daily        propafenone 150 MG Tabs tablet    RYTHMOL     Take 150 mg by mouth        ranibizumab 0.3 MG/0.05ML Soln    LUCENTIS     0.3 mg by Intravitreal route        ranitidine 150 MG tablet    ZANTAC    60 tablet    Take 1 tablet (150 mg) by mouth 2 times daily    Gastroesophageal reflux disease without esophagitis       REFRESH P.M. Oint      Apply  to eye. Daily at bedtime        saccharomyces boulardii 250 MG capsule    FLORASTOR     Take 250 mg by mouth daily         senna-docusate 8.6-50 MG per tablet    SENOKOT-S;PERICOLACE    120 tablet    Take 2 tablets by mouth At Bedtime Hold if diarrhea occurs.    Constipation, chronic       triamcinolone 0.1 % cream    KENALOG    453.6 g    Apply sparingly to affected area on face three times daily.    Facial lesion       VITAMIN C PO           ZYRTEC ALLERGY 10 MG tablet   Generic drug:  cetirizine      Take 10 mg by mouth At Bedtime        * Notice:  This list has 3 medication(s) that are the same as other medications prescribed for you. Read the directions carefully, and ask your doctor or other care provider to review them with you.

## 2018-07-16 ENCOUNTER — TELEPHONE (OUTPATIENT)
Dept: FAMILY MEDICINE | Facility: CLINIC | Age: 83
End: 2018-07-16

## 2018-07-16 NOTE — TELEPHONE ENCOUNTER
This writer attempted to contact Linda on 07/16/18      Reason for call Medication and Left message.      If patient calls back:   Patient contacted by 1st floor [place] Care Team (MA/TC). Inform patient that someone from the team will contact them, document that pt called and route to care team.         Hollie Aguilar

## 2018-07-16 NOTE — TELEPHONE ENCOUNTER
Routing update to provider to advise.     Message from OT:    MIGUELINA:  Patient blood pressure elevated again  This am 7:15 167/76 heart rate 61     No other symptoms     Thank you     OT Josephine Trimble (HOME CARE) [phone]        Carmenza Miller RN, BSN

## 2018-07-16 NOTE — TELEPHONE ENCOUNTER
BP is elevated due to pain secondary to her acute sacral fracture, which led to ER visit last 7/2/18.    Inform patient to increase Losartan to 100 mg once a day (by taking 2 tablets of Losartan 50 mg once a day).

## 2018-07-16 NOTE — TELEPHONE ENCOUNTER
FYI:  Patient blood pressure elevated again  This am 7:15 167/76 heart rate 61    No other symptoms    Thank you    OT Josephine Trimble (HOME CARE) [phone]

## 2018-07-17 NOTE — TELEPHONE ENCOUNTER
..Reason for Call:  Other     Detailed comments: Patient said she was returning a call please call the patient with more information.    Phone Number Patient can be reached at: Home number on file [phone] (home)    Best Time: anytime    Can we leave a detailed message on this number? YES    Call taken on 7/17/2018 at 11:55 AM by Leonarda Gonzalez

## 2018-07-17 NOTE — TELEPHONE ENCOUNTER
Gave patient providers message and she wrote it down so she would not forget. She thanked me for the call and said she will keep an eye on her BP and pulse.     Carmenza Miller RN, BSN

## 2018-07-18 ENCOUNTER — TELEPHONE (OUTPATIENT)
Dept: FAMILY MEDICINE | Facility: CLINIC | Age: 83
End: 2018-07-18

## 2018-07-18 ENCOUNTER — PATIENT OUTREACH (OUTPATIENT)
Dept: CARE COORDINATION | Facility: CLINIC | Age: 83
End: 2018-07-18

## 2018-07-18 ENCOUNTER — OFFICE VISIT (OUTPATIENT)
Dept: FAMILY MEDICINE | Facility: CLINIC | Age: 83
End: 2018-07-18
Payer: MEDICARE

## 2018-07-18 VITALS
DIASTOLIC BLOOD PRESSURE: 70 MMHG | HEART RATE: 59 BPM | RESPIRATION RATE: 32 BRPM | OXYGEN SATURATION: 99 % | BODY MASS INDEX: 28.3 KG/M2 | TEMPERATURE: 97.8 F | WEIGHT: 165 LBS | SYSTOLIC BLOOD PRESSURE: 158 MMHG

## 2018-07-18 DIAGNOSIS — H91.91 DECREASED HEARING OF RIGHT EAR: ICD-10-CM

## 2018-07-18 DIAGNOSIS — H93.8X1 EAR FULLNESS, RIGHT: Primary | ICD-10-CM

## 2018-07-18 PROCEDURE — 99213 OFFICE O/P EST LOW 20 MIN: CPT | Performed by: NURSE PRACTITIONER

## 2018-07-18 ASSESSMENT — PAIN SCALES - GENERAL: PAINLEVEL: MODERATE PAIN (4)

## 2018-07-18 NOTE — PROGRESS NOTES
"Clinic Care Coordination Contact  Care Team Conversations    Patient called into RN CC for advice, as she reports loss of hearing noted since her fall 7/2/18.  Patient states it is intermittent, but she seems to be noticing it more often \"when I think of it.\"  Patient also reports a numb/full feeling in her right ear, \"like it's blocked.\"  Patient states \"it is not severe, it just keeps coming back.\"  Patient states she noticed when she was in [facility], but did not think to mention it to anyone until she saw her home care nurse today who advised her to be seen.  Patient denies change in baseline dizziness, \"my balance seems to be the same.\"  Patient has an appointment with ENT next available 7/31/18, but states she wonders whether she should be seen sooner.    RN CC discussed with triage RN and it was agreed that patient should be seen within 24 hours due to hearing loss >3 days.  Patient scheduled an appointment for today.    Plan: RN Care Coordinator will await notification from home care staff informing RN Care Coordinator of patients discharge plans/needs. RN Care Coordinator will review chart and outreach to home care every 4 weeks and will remain available to patient, care team and home care as needed.       Melissa Behl BSN, RN, N  [facility] Care Coordinator  [phone]       "

## 2018-07-18 NOTE — PROGRESS NOTES
SUBJECTIVE:   Linda Spicer is a 87 year old female who presents to clinic today for the following health issues:      Ear Problem: Right Ear  . 2 weeks  . Loss of hearing  . Feels like cotton in ear    87 year old female presents with daughter Xi with the above concerns. No pain. No recent cough or cold symptoms. Started around the time she was in the hospital for fall. Had CT of head that was negative. No headache. No numbness, tingling, weakness. No nausea or vomiting. No dizziness. Wears hearing aids. Never had issues with wax. Has appt with otolaryngology (ENT) on 7/31.    Problem list and histories reviewed & adjusted, as indicated.  Additional history: as documented    Patient Active Problem List   Diagnosis     ACP (advance care planning)     Hypertension goal BP (blood pressure) < 150/90     Hypothyroidism     Macular degeneration, left eye     Irritable bowel syndrome     Encounter for palliative care     Adjustment disorder with anxious mood     Mild anemia     DDD (degenerative disc disease), lumbar     CKD (chronic kidney disease) stage 3, GFR 30-59 ml/min     History of blood transfusion     Aftercare following surgery     S/P lumbar laminectomy     High risk medication use     Age-related osteoporosis without current pathological fracture     Atrophic vaginitis     Fecal incontinence     Female stress incontinence     Impingement syndrome of both shoulders     UIP (usual interstitial pneumonitis) (H)     High risk medications (not anticoagulants) long-term use     Heart failure with preserved ejection fraction (H)     Congestive heart failure with preserved LV function, NYHA class 3 (H)     Other specified hypothyroidism     Rheumatoid arthritis involving multiple sites with positive rheumatoid factor (H)     Health Care Home     Sick sinus syndrome (H)     Cardiac pacemaker - Medtronic dual lead pacemaker - Not Dependent - MRI Safe     Immunosuppression (H)     ILD (interstitial lung disease)  (H)     Heart failure with preserved ejection fraction, NYHA class I (H)     Past Surgical History:   Procedure Laterality Date     APPENDECTOMY       BIOPSY      hemorrhoidectomy     ENT SURGERY      tonsillectomy     GYN SURGERY      3 D & C's     HYSTERECTOMY, PAP NO LONGER INDICATED       LAMINECTOMY LUMBAR ONE LEVEL N/A 10/13/2015    Procedure: LAMINECTOMY LUMBAR ONE LEVEL;  Surgeon: Fransico Toussaint MD;  Location:  OR       Social History   Substance Use Topics     Smoking status: Never Smoker     Smokeless tobacco: Never Used     Alcohol use Yes      Comment: rare wine      Family History   Problem Relation Age of Onset     Hypertension Mother      Psychotic Disorder Father      Diabetes Son      Diabetes Daughter      Blood Disease Daughter          Current Outpatient Prescriptions   Medication Sig Dispense Refill     albuterol (2.5 MG/3ML) 0.083% neb solution Take 1 vial by nebulization every 6 hours as needed for shortness of breath / dyspnea or wheezing       apixaban ANTICOAGULANT (ELIQUIS) 2.5 MG tablet Take 1 tablet (2.5 mg) by mouth 2 times daily 180 tablet 3     Artificial Tear Ointment (REFRESH P.M.) OINT Apply  to eye. Daily at bedtime          Ascorbic Acid (VITAMIN C PO)        azaTHIOprine (IMURAN) 50 MG tablet Take 1 tablet (50 mg) by mouth daily 90 tablet 2     bismuth subsalicylate (PEPTO BISMOL) 262 MG chewable tablet Take 2 tablets (524 mg) by mouth 4 times daily as needed 100 tablet 11     Blood Pressure Monitor KIT 1 kit daily as needed 1 kit 0     bumetanide (BUMEX) 1 MG tablet Take 1 tablet (1 mg) by mouth every morning 30 tablet 11     calcium-vitamin D (CALTRATE) 600-400 MG-UNIT per tablet Take 1 tablet by mouth 2 times daily        cetirizine (ZYRTEC ALLERGY) 10 MG tablet Take 10 mg by mouth At Bedtime       COMPOUNDED NON-CONTROLLED SUBSTANCE (CMPD RX) - PHARMACY TO MIX COMPOUNDED MEDICATION Estriol 1mg/gram. Place 1 gram vaginally daily for two weeks, then vaginally twice  weekly after. 30 g 6     fish oil-omega-3 fatty acids (FISH OIL) 1000 MG capsule Take 2 g by mouth daily. 2 capsules daily            folic acid (FOLVITE) 1 MG tablet Take 1 tablet (1 mg) by mouth daily 90 tablet 3     hydrocortisone 2.5 % cream Apply BID to affected region(s) for 7-10 days. 30 g 0     Hypromellose (GENTEAL MILD OP) Apply  to eye daily.       levothyroxine (SYNTHROID/LEVOTHROID) 75 MCG tablet TAKE ONE TABLET BY MOUTH EVERY DAY 90 tablet 2     loratadine (CLARITIN) 10 MG tablet Take 10 mg by mouth every morning       LORazepam (ATIVAN) 1 MG tablet Take 1 tablet (1 mg) by mouth 2 times daily 30 tablet 0     losartan (COZAAR) 50 MG tablet Take 1 tablet (50 mg) by mouth daily 90 tablet 1     methocarbamol (ROBAXIN) 500 MG tablet Take 250 mg by mouth       metoprolol (LOPRESSOR) 25 MG tablet Take 1 tablet (25 mg) by mouth 2 times daily 60 tablet 5     Multiple Vitamins-Minerals (PRESERVISION AREDS PO) Take 1 tablet by mouth daily        nitroglycerin (NITROSTAT) 0.4 MG SL tablet Place 1 tablet (0.4 mg) under the tongue every 5 minutes as needed for chest pain 25 tablet 0     order for DME Dispense SP Walker-small 1 each 0     order for DME Equipment being ordered: Dispense baffle, for use with nebulizer. 1 each 0     order for DME Equipment being ordered: Nebulizer. Use with Albuterol. 1 each 0     order for DME Equipment being ordered: Dispense face mask.  Mrs. Spicer is immunosuppressed due to rheumatoid arthritis. 1 Box 11     oxyCODONE IR (ROXICODONE) 5 MG tablet Take 2.5 mg by mouth       propafenone (RYTHMOL) 150 MG TABS tablet Take 150 mg by mouth       ranibizumab (LUCENTIS) 0.3 MG/0.05ML SOLN 0.3 mg by Intravitreal route       ranitidine (ZANTAC) 150 MG tablet Take 1 tablet (150 mg) by mouth 2 times daily 60 tablet 5     saccharomyces boulardii (FLORASTOR) 250 MG capsule Take 250 mg by mouth daily       senna-docusate (SENOKOT-S;PERICOLACE) 8.6-50 MG per tablet Take 2 tablets by mouth At Bedtime  Hold if diarrhea occurs. 120 tablet 11     triamcinolone (KENALOG) 0.1 % cream Apply sparingly to affected area on face three times daily. 453.6 g 5     Cranberry 180 MG CAPS Take 1 capsule by mouth daily Unsure of strength       MetroNIDAZOLE (FLAGYL PO) Take 500 mg by mouth 2 times daily        Allergies   Allergen Reactions     Cephalexin Hcl Diarrhea     Gabapentin Other (See Comments)     Dizzsiness     Naproxen GI Disturbance     Perfume      Lactase Other (See Comments)     Macrobid [Nitrofurantoin Anhydrous]      Possibly related to lung disease      Seasonal Allergies      Sulfa Drugs      Throat swelling     Xanax [Alprazolam] Other (See Comments)     Dizziness        Reviewed and updated as needed this visit by clinical staff  Tobacco  Allergies  Meds  Problems  Med Hx  Surg Hx  Fam Hx  Soc Hx        Reviewed and updated as needed this visit by Provider  Allergies  Meds  Problems         ROS:  Constitutional, HEENT, cardiovascular, pulmonary, gi and gu systems are negative, except as otherwise noted.    OBJECTIVE:     /70  Pulse 59  Temp 97.8  F (36.6  C) (Oral)  Resp (!) 32  Wt 165 lb (74.8 kg)  LMP  (LMP Unknown)  SpO2 99%  Breastfeeding? No  BMI 28.3 kg/m2  Body mass index is 28.3 kg/(m^2).  GENERAL: healthy, alert and no distress  EYES: Eyes grossly normal to inspection, PERRL and conjunctivae and sclerae normal  HENT: ear canals and TM's normal, nose and mouth without ulcers or lesions  NECK: no adenopathy, no asymmetry, masses, or scars and thyroid normal to palpation  RESP: lungs clear to auscultation - no rales, rhonchi or wheezes  CV: regular rate and rhythm, normal S1 S2, no S3 or S4, no murmur, click or rub, no peripheral edema and peripheral pulses strong  MS: no gross musculoskeletal defects noted, no edema  PSYCH: mentation appears normal, affect normal/bright    Diagnostic Test Results:  none     ASSESSMENT/PLAN:       ICD-10-CM    1. Ear fullness, right H93.8X1     2. Decreased hearing of right ear H91.91      Exam is normal today  Please follow up with the otolaryngology (ENT) physician at your appointment that's already scheduled  If you have worsening symptoms or dizziness, nausea, vomiting, pain, please be re-evaluated before then    See Patient Instructions    Patient verbalizes understanding and agrees with plan of care. Patient stable for discharge.      SUNNI Polanco [facility]

## 2018-07-18 NOTE — TELEPHONE ENCOUNTER
Routing to provider to update on pt BP today during OT session with [facility]. See reading below. Chart review shows pt has had very similar readings in recent past. Coincidentally, pt was seen in clinic today by Estela Mccrary CNP  for ear fullness and BP was higher at 158/70.    Shayla Lee RN  [facility] Triage

## 2018-07-18 NOTE — MR AVS SNAPSHOT
After Visit Summary   7/18/2018    Linda Spicer    MRN: [id number]           Patient Information     Date Of Birth          6/13/1931        Visit Information        Provider Department      7/18/2018 1:40 PM Estela Mccrary APRN CNP [facility]        Today's Diagnoses     Ear fullness, right    -  1    Decreased hearing of right ear          Care Instructions    Exam is normal today  Please follow up with the otolaryngology (ENT) physician at your appointment that's already scheduled  If you have worsening symptoms or dizziness, nausea, vomiting, pain, please be re-evaluated before then      At [facility], we strive to deliver an exceptional experience to you, every time we see you.  If you receive a survey in the mail, please send us back your thoughts. We really do value your feedback.    Based on your medical history, these are the current health maintenance/preventive care services that you are due for (some may have been done at this visit.)  Health Maintenance Due   Topic Date Due     BMP Q6 MOS  07/22/2018         Suggested websites for health information:  Www.[employer].org : Up to date and easily searchable information on multiple topics.  Www.medlineplus.gov : medication info, interactive tutorials, watch real surgeries online  Www.familydoctor.org : good info from the Academy of Family Physicians  Www.cdc.gov : public health info, travel advisories, epidemics (H1N1)  Www.aap.org : children's health info, normal development, vaccinations  Www.health.[facility].[place].us : [place] dept of health, public health issues in [place], N1N1    Your care team:                            Family Medicine Internal Medicine   MD Tre Hicks MD Shantel Branch-Fleming, MD Katya Georgiev PA-C Nam Ho, MD Pediatrics   ABY Snyder, MD Maritza Tinsley CNP, MD Deborah Mielke, MD Kim Thein,  APRN CNP      Clinic hours: Monday - Thursday 7 am-7 pm; Fridays 7 am-5 pm.   Urgent care: Monday - Friday 11 am-9 pm; Saturday and Sunday 9 am-5 pm.  Pharmacy : Monday -Thursday 8 am-8 pm; Friday 8 am-6 pm; Saturday and Sunday 9 am-5 pm.     Clinic: [phone]   Pharmacy: [phone]            Follow-ups after your visit        Your next 10 appointments already scheduled     Jul 19, 2018 10:15 AM CDT   CT PELVIS BONE WO CONTRAST with MGCT1   [facility] ([facility])    [address] [place] [address] [phone]           Please bring any scans or X-rays taken at other hospitals, if similar tests were done. Also bring a list of your medicines, including vitamins, minerals and over-the-counter drugs. It is safest to leave personal items at home.  Be sure to tell your doctor:   If you have any allergies.   If there s any chance you are pregnant.   If you are breastfeeding.  How to prepare:   Do not eat or drink for 2 hours before your exam. If you need to take medicine, you may take it with small sips of water. (We may ask you to take liquid medicine as well.)   Please wear loose clothing, such as a sweat suit or jogging clothes. Avoid snaps, zippers and other metal. We may ask you to undress and put on a hospital gown.  Please arrive 30 minutes early for your CT. Once in the department you might be asked to drink water 15-20 minutes prior to your exam.  If indicated you may be asked to drink an oral contrast in advance of your CT.  If this is the case, the imaging team will let you know or be in contact with you prior to your appointment  Patients over 70 or patients with diabetes or kidney problems:   If you haven t had a blood test (creatinine test) within the last 30 days, the Cardiologist/Radiologist may require you to get this test prior to your exam.  If you have diabetes:   Continue to take your metformin medication on the day of your exam  If you  have any questions, please call the Imaging Department where you will have your exam.            Jul 26, 2018  9:30 AM CDT   Level 1 with BAY 6 INFUSION   [facility] ([facility])    [address] [place] [address]   [phone]            Jul 31, 2018 10:00 AM CDT   Return Visit with Triny Michel   [facility] ([facility])    [address] [facility] [address]   [phone]            Jul 31, 2018 10:30 AM CDT   New Visit with Fredrick Myers MD   [facility] ([facility])    [address] [address]   [phone]            Aug 20, 2018 12:30 PM CDT   Return Visit with Emeterio Loza MD   [facility])    [address] [place] [address]   [phone]            Aug 23, 2018  9:00 AM CDT   Level 1 with BAY 8 INFUSION   [facility] ([facility])    [address] [place] [address]   [phone]            Sep 05, 2018 10:00 AM CDT   Return Visit with DEVICE CHECK RN CARD   [facility] ([facility])    [address] [place] [address]   [phone]            Sep 11, 2018 10:00 AM CDT   Return Visit with Mario Davenport MD   [facility] ([facility])    [address] [address]   [phone]            Dec 11, 2018 10:00 AM CST   Office Visit with PFT LAB   [facility])    [address] [place] [address]   [phone]           Bring a current list of meds and any records pertaining to this visit. For Physicals, please bring immunization records and any forms needing to be filled out. Please arrive 10 minutes early to complete paperwork.             Dec 11, 2018 11:00 AM CST   Return Visit with Dea Acosta MD   [facility] ([facility])    [address] [place] [address] [phone]              Who to contact     If you have questions or need follow up information about today's clinic visit or your schedule please contact [facility] directly at [phone].  Normal or non-critical lab and imaging results will be communicated to you by [employer], letter or phone within 4 business days after the clinic has received the results. If you do not hear from us within 7 days, please contact the clinic through [employer] or phone. If you have a critical or abnormal lab result, we will notify you by phone as soon as possible.  Submit refill requests through [employer] or call your pharmacy and they will forward the refill request to us. Please allow 3 business days for your refill to be completed.          Additional Information About Your Visit        [employer] Information     [employer] gives you secure access to your electronic health record. If you see a primary care provider, you can also send messages to your care team and make appointments. If you have questions, please call your primary care clinic.  If you do not have a primary care provider, please call [phone] and they will assist you.        Care EveryWhere ID     This is your Care EveryWhere ID. This could be used by other organizations to access your [facility] medical records  [id number]        Your Vitals Were     Pulse Temperature Respirations Last Period Pulse Oximetry Breastfeeding?    59 97.8  F (36.6  C) (Oral) 32 (LMP Unknown) 99% No    BMI (Body Mass Index)                   28.3 kg/m2            Blood Pressure from Last 3 Encounters:   07/18/18 158/70   07/12/18 169/76   06/28/18 120/70    Weight from Last 3 Encounters:   07/18/18 165 lb (74.8 kg)   07/12/18 165 lb 6.4 oz (75 kg)   06/28/18 165 lb 11.2 oz (75.2 kg)               Today, you had the following     No orders found for display       Primary Care Provider Office Phone # Fax #    Tre Lockett [phone]       [address]  [facility] [address]        Goals        General    I will complete my health care directive. (pt-stated)     Notes - Note created  3/29/2018  3:07 PM by Behl, Melissa K, RN    Supporting Steps:    Pt has attended  a health care  directive clas-  s, is awaiting  to see her law-  elizabeth to finalize  the document.  10/24/17 Pt  informed of CPR  vs intubation  and information  mailed out to  patient.             Equal Access to Services     [facility]: Hadii aad ku hadasho Soomaali, waaxda luqadaha, qaybta kaalmada adeegyada, zahraa moss . So [facility] [phone].    ATENCIÓN: Si habla español, tiene a merchant disposición servicios gratuitos de asistencia lingüística. Llame al [phone].    We comply with applicable federal civil rights laws and [place] laws. We do not discriminate on the basis of race, color, national origin, age, disability, sex, sexual orientation, or gender identity.            Thank you!     Thank you for choosing [facility]  for your care. Our goal is always to provide you with excellent care. Hearing back from our patients is one way we can continue to improve our services. Please take a few minutes to complete the written survey that you may receive in the mail after your visit with us. Thank you!             Your Updated Medication List - Protect others around you: Learn how to safely use, store and throw away your medicines at www.disposemymeds.org.          This list is accurate as of 7/18/18  2:43 PM.  Always use your most recent med list.                   Brand Name Dispense Instructions for use Diagnosis    albuterol (2.5 MG/3ML) 0.083% neb solution      Take 1 vial by nebulization every 6 hours as needed for shortness of breath / dyspnea or wheezing         apixaban ANTICOAGULANT 2.5 MG tablet    ELIQUIS    180 tablet    Take 1 tablet (2.5 mg) by mouth 2 times daily    Atrial flutter, paroxysmal (H)       azaTHIOprine 50 MG tablet    IMURAN    90 tablet    Take 1 tablet (50 mg) by mouth daily    Rheumatoid arthritis involving multiple sites with positive rheumatoid factor (H)       bismuth subsalicylate 262 MG chewable tablet    PEPTO BISMOL    100 tablet    Take 2 tablets (524 mg) by mouth 4 times daily as needed    Dyspepsia and disorder of function of stomach       Blood Pressure Monitor Kit     1 kit    1 kit daily as needed    CHF (congestive heart failure) (H)       bumetanide 1 MG tablet    BUMEX    30 tablet    Take 1 tablet (1 mg) by mouth every morning    Chronic diastolic congestive heart failure (H)       calcium-vitamin D 600-400 MG-UNIT per tablet    CALTRATE     Take 1 tablet by mouth 2 times daily        COMPOUNDED NON-CONTROLLED SUBSTANCE - PHARMACY TO MIX COMPOUNDED MEDICATION    CMPD RX    30 g    Estriol 1mg/gram. Place 1 gram vaginally daily for two weeks, then vaginally twice weekly after.    Atrophic vaginitis       Cranberry 180 MG Caps      Take 1 capsule by mouth daily Unsure of strength        fish oil-omega-3 fatty acids 1000 MG capsule      Take 2 g by mouth daily. 2 capsules daily        FLAGYL PO      Take 500 mg by mouth 2 times daily    Acute infectious diarrhea       folic acid 1 MG tablet    FOLVITE    90 tablet    Take 1 tablet (1 mg) by mouth daily    Oral aphthae       GENTEAL MILD OP      Apply  to eye daily.        hydrocortisone 2.5 % cream     30 g    Apply BID to affected region(s) for 7-10 days.    Rash       levothyroxine 75 MCG tablet    SYNTHROID/LEVOTHROID    90 tablet    TAKE ONE TABLET BY MOUTH EVERY DAY    Hypothyroidism, unspecified type       loratadine 10 MG tablet    CLARITIN     Take 10 mg by mouth every morning        LORazepam 1 MG tablet    ATIVAN    30 tablet    Take 1 tablet (1 mg) by mouth 2 times  daily    Anxiety       losartan 50 MG tablet    COZAAR    90 tablet    Take 1 tablet (50 mg) by mouth daily    Heart failure with preserved ejection fraction, NYHA class I (H)       methocarbamol 500 MG tablet    ROBAXIN     Take 250 mg by mouth        metoprolol tartrate 25 MG tablet    LOPRESSOR    60 tablet    Take 1 tablet (25 mg) by mouth 2 times daily    Atrial fibrillation, unspecified type (H), Hypertension goal BP (blood pressure) < 150/90       nitroGLYcerin 0.4 MG sublingual tablet    NITROSTAT    25 tablet    Place 1 tablet (0.4 mg) under the tongue every 5 minutes as needed for chest pain    Chest pain       * order for DME     1 Box    Equipment being ordered: Dispense face mask. Mrs. Spicer is immunosuppressed due to rheumatoid arthritis.    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 Use with Albuterol.    Hypoxia       order for DME     1 each    Equipment being ordered: Dispense baffle, for use with nebulizer.    Pneumonia of left upper lobe due to Mycoplasma pneumoniae       * order for DME     1 each    Dispense SP Walker-small    Pain of toe of right foot, Status post bunionectomy       oxyCODONE IR 5 MG tablet    ROXICODONE     Take 2.5 mg by mouth        PRESERVISION AREDS PO      Take 1 tablet by mouth daily        propafenone 150 MG Tabs tablet    RYTHMOL     Take 150 mg by mouth        ranibizumab 0.3 MG/0.05ML Soln    LUCENTIS     0.3 mg by Intravitreal route        ranitidine 150 MG tablet    ZANTAC    60 tablet    Take 1 tablet (150 mg) by mouth 2 times daily    Gastroesophageal reflux disease without esophagitis       REFRESH P.M. Oint      Apply  to eye. Daily at bedtime        saccharomyces boulardii 250 MG capsule    FLORASTOR     Take 250 mg by mouth daily        senna-docusate 8.6-50 MG per tablet    SENOKOT-S;PERICOLACE    120 tablet    Take 2 tablets by mouth At Bedtime Hold  if diarrhea occurs.    Constipation, chronic       triamcinolone 0.1 % cream    KENALOG    453.6 g    Apply sparingly to affected area on face three times daily.    Facial lesion       VITAMIN C PO           ZYRTEC ALLERGY 10 MG tablet   Generic drug:  cetirizine      Take 10 mg by mouth At Bedtime        * Notice:  This list has 3 medication(s) that are the same as other medications prescribed for you. Read the directions carefully, and ask your doctor or other care provider to review them with you.

## 2018-07-19 ENCOUNTER — RADIANT APPOINTMENT (OUTPATIENT)
Dept: CT IMAGING | Facility: CLINIC | Age: 83
End: 2018-07-19
Attending: INTERNAL MEDICINE
Payer: MEDICARE

## 2018-07-19 ENCOUNTER — TELEPHONE (OUTPATIENT)
Dept: CARE COORDINATION | Facility: CLINIC | Age: 83
End: 2018-07-19

## 2018-07-19 ENCOUNTER — PATIENT OUTREACH (OUTPATIENT)
Dept: CARE COORDINATION | Facility: CLINIC | Age: 83
End: 2018-07-19

## 2018-07-19 DIAGNOSIS — S32.10XD CLOSED FRACTURE OF SACRUM WITH ROUTINE HEALING, UNSPECIFIED FRACTURE MORPHOLOGY, SUBSEQUENT ENCOUNTER: ICD-10-CM

## 2018-07-19 DIAGNOSIS — Z53.9 DIAGNOSIS NOT YET DEFINED: Primary | ICD-10-CM

## 2018-07-19 DIAGNOSIS — R10.2 PELVIC PAIN IN FEMALE: ICD-10-CM
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
## 2018-07-19 PROCEDURE — G0180 MD CERTIFICATION HHA PATIENT: HCPCS | Performed by: INTERNAL MEDICINE

## 2018-07-19 PROCEDURE — 72192 CT PELVIS W/O DYE: CPT | Performed by: RADIOLOGY

## 2018-07-19 NOTE — TELEPHONE ENCOUNTER
Patient calls into [facility] to inquire if there are parameters of when she should take a lower dose of losartan.  Patient was instructed per 7/16/18 telephone encounter to increase her losartan to 100 mg once per day (previously taking losartan 50 mg/day)  Patient states she felt weak this morning and her SBP was 109.  Patient's weakness resolved with rest and blood pressure at time of call was 148/64.  Patient normally takes her medication after lunch (around this time).    Please advise if there are BP parameters of when patient should decrease/hold her losartan.    Melissa Behl BSN, RN, PHN  [facility] Care Coordinator  [phone]

## 2018-07-19 NOTE — TELEPHONE ENCOUNTER
Patient updated on the below, no questions at this time, verbalized understanding.      Patient asked if she should be taking it at noon and at dinner time as she used to.    Advised patient to take it with breakfast and dinner to have space between doses.  Please advise if times of the day should be different.    Betsey Mendoza RN

## 2018-07-19 NOTE — TELEPHONE ENCOUNTER
Called spoke to Xi, patient is scheduled for next Friday.  Elmo Jimenez,  [facility]

## 2018-07-19 NOTE — TELEPHONE ENCOUNTER
Change Losartan to 50 mg BID (1/2 tab of 100 mg Losartan twice a day).  Hold Losartan is SBP < 120.

## 2018-07-19 NOTE — PROGRESS NOTES
Clinic Care Coordination Contact  Care Team Conversations    Patient calls into [facility] to inquire if there are parameters of when she should take a lower dose of losartan.  Patient was instructed per 7/16/18 telephone encounter to increase her losartan to 100 mg once per day (previously taking losartan 50 mg/day)  Patient states she felt weak this morning and her SBP was 109.  Patient's weakness resolved with rest and blood pressure at time of call was 148/64.  Patient normally takes her medication after lunch (around this time).    See 7/19/18 telephone encounter.    Melissa Behl BSN, RN, N  [facility] Care Coordinator  [phone]

## 2018-07-20 NOTE — TELEPHONE ENCOUNTER
Agree with recommendations to take Losartan  with breakfast and supper (instead of lunch and supper).

## 2018-07-25 ENCOUNTER — TELEPHONE (OUTPATIENT)
Dept: CARDIOLOGY | Facility: CLINIC | Age: 83
End: 2018-07-25

## 2018-07-25 ENCOUNTER — NURSE TRIAGE (OUTPATIENT)
Dept: NURSING | Facility: CLINIC | Age: 83
End: 2018-07-25

## 2018-07-25 DIAGNOSIS — I10 HTN (HYPERTENSION): Primary | ICD-10-CM

## 2018-07-25 DIAGNOSIS — I48.92 ATRIAL FLUTTER (H): ICD-10-CM

## 2018-07-25 RX ORDER — METOPROLOL SUCCINATE 25 MG/1
25 TABLET, EXTENDED RELEASE ORAL DAILY
Qty: 90 TABLET | Refills: 3 | COMMUNITY
Start: 2018-07-25 | End: 2018-09-06

## 2018-07-26 ENCOUNTER — TELEPHONE (OUTPATIENT)
Dept: RHEUMATOLOGY | Facility: CLINIC | Age: 83
End: 2018-07-26

## 2018-07-26 NOTE — TELEPHONE ENCOUNTER
New medication Propafenone for blood pressure last month. Also on Toprol XL.  201/92, HR 61 197/93 Denies symptoms.  Will call/see PCP within 24 hours.  Lory Patel RN  [facility]      Reason for Disposition    BP  >= 180/110    Additional Information    Negative: Difficult to awaken or acting confused  (e.g., disoriented, slurred speech)    Negative: Severe difficulty breathing (e.g., struggling for each breath, speaks in single words)    Negative: [1] Weakness of the face, arm or leg on one side of the body AND [2] new onset    Negative: [1] Numbness (i.e., loss of sensation) of the face, arm or leg on one side of the body AND [2] new onset    Negative: [1] Chest pain lasts > 5 minutes AND [2] history of heart disease  (i.e., heart attack, bypass surgery, angina, angioplasty, CHF)    Negative: [1] Chest pain AND [2] took nitrogylcerin AND [3] pain was not relieved    Negative: Sounds like a life-threatening emergency to the triager    Negative: [1] BP  >= 160 / 100 AND [2] cardiac or neurologic symptoms    (e.g., chest pain, difficulty breathing, unsteady gait, blurred vision)    Negative: [1] Pregnant AND [2] new hand or face swelling    Negative: [1] Pregnant > 20 weeks AND [2] BP  >= 140/90    Negative: [1] BP  >= 180/110 AND [2] missed most recent dose of blood pressure medication    Protocols used: HIGH BLOOD PRESSURE-ADULT-

## 2018-07-27 ENCOUNTER — OFFICE VISIT (OUTPATIENT)
Dept: FAMILY MEDICINE | Facility: CLINIC | Age: 83
End: 2018-07-27
Payer: MEDICARE

## 2018-07-27 VITALS
BODY MASS INDEX: 28.1 KG/M2 | DIASTOLIC BLOOD PRESSURE: 64 MMHG | RESPIRATION RATE: 16 BRPM | HEART RATE: 60 BPM | TEMPERATURE: 98.2 F | OXYGEN SATURATION: 98 % | WEIGHT: 164.6 LBS | SYSTOLIC BLOOD PRESSURE: 138 MMHG | HEIGHT: 64 IN

## 2018-07-27 DIAGNOSIS — I10 HYPERTENSION GOAL BP (BLOOD PRESSURE) < 140/90: ICD-10-CM

## 2018-07-27 DIAGNOSIS — H81.10 BENIGN PAROXYSMAL POSITIONAL VERTIGO, UNSPECIFIED LATERALITY: Primary | ICD-10-CM

## 2018-07-27 PROCEDURE — 99214 OFFICE O/P EST MOD 30 MIN: CPT | Performed by: INTERNAL MEDICINE

## 2018-07-27 RX ORDER — MECLIZINE HYDROCHLORIDE 25 MG/1
25 TABLET ORAL PRN
COMMUNITY
Start: 2018-07-26 | End: 2018-08-20

## 2018-07-27 RX ORDER — TRIAMTERENE AND HYDROCHLOROTHIAZIDE 37.5; 25 MG/1; MG/1
1 CAPSULE ORAL DAILY
Qty: 30 CAPSULE | Refills: 5 | Status: SHIPPED | OUTPATIENT
Start: 2018-07-27 | End: 2018-08-09

## 2018-07-27 ASSESSMENT — PAIN SCALES - GENERAL: PAINLEVEL: MILD PAIN (3)

## 2018-07-27 NOTE — PROGRESS NOTES
SUBJECTIVE:   Linda Spicer is a 87 year old female who presents to clinic today for the following health issues:          1) ED/UC Followup:    Facility:  [facility]  Date of visit: 7/26/18  Reason for visit: dizziness  Current Status: given meclizine - seems to improve sx's, but pt is still having dizziness, BP has improved      2) Hypertension Follow-up      Outpatient blood pressures monitoring: yes, and BPs are high.    Low Salt Diet: yes    Adverse effects: no    Compliance: good    Secondary causes: none    Chronic kidney disease: yes    Hyperthyroidism: no    Anxiety: yes    Decongestants: no    Substance abuse: no    Diabetes: no    Ischemic heart disease: no    Stroke: no    Hyperlipidemia: yes       Problem list and histories reviewed & adjusted, as indicated.  Additional history: as documented    Patient Active Problem List   Diagnosis     ACP (advance care planning)     Hypertension goal BP (blood pressure) < 150/90     Hypothyroidism     Macular degeneration, left eye     Irritable bowel syndrome     Encounter for palliative care     Adjustment disorder with anxious mood     Mild anemia     DDD (degenerative disc disease), lumbar     CKD (chronic kidney disease) stage 3, GFR 30-59 ml/min     History of blood transfusion     Aftercare following surgery     S/P lumbar laminectomy     High risk medication use     Age-related osteoporosis without current pathological fracture     Atrophic vaginitis     Fecal incontinence     Female stress incontinence     Impingement syndrome of both shoulders     UIP (usual interstitial pneumonitis) (H)     High risk medications (not anticoagulants) long-term use     Heart failure with preserved ejection fraction (H)     Congestive heart failure with preserved LV function, NYHA class 3 (H)     Other specified hypothyroidism     Rheumatoid arthritis involving multiple sites with positive rheumatoid factor (H)     Health Care Home     Sick sinus syndrome  (H)     Cardiac pacemaker - Medtronic dual lead pacemaker - Not Dependent - MRI Safe     Immunosuppression (H)     ILD (interstitial lung disease) (H)     Heart failure with preserved ejection fraction, NYHA class I (H)     Atrial flutter (H)     Past Surgical History:   Procedure Laterality Date     APPENDECTOMY       BIOPSY      hemorrhoidectomy     ENT SURGERY      tonsillectomy     GYN SURGERY      3 D & C's     HYSTERECTOMY, PAP NO LONGER INDICATED       LAMINECTOMY LUMBAR ONE LEVEL N/A 10/13/2015    Procedure: LAMINECTOMY LUMBAR ONE LEVEL;  Surgeon: Fransico Toussaint MD;  Location:  OR       Social History   Substance Use Topics     Smoking status: Never Smoker     Smokeless tobacco: Never Used     Alcohol use Yes      Comment: rare wine      Family History   Problem Relation Age of Onset     Hypertension Mother      Psychotic Disorder Father      Diabetes Son      Diabetes Daughter      Blood Disease Daughter          Allergies   Allergen Reactions     Cephalexin Hcl Diarrhea     Gabapentin Other (See Comments)     Dizzsiness     Naproxen GI Disturbance     Perfume      Lactase Other (See Comments)     Macrobid [Nitrofurantoin Anhydrous]      Possibly related to lung disease      Seasonal Allergies      Sulfa Drugs      Throat swelling     Xanax [Alprazolam] Other (See Comments)     Dizziness      Ciprofloxacin Itching and Rash     Recent Labs   Lab Test  05/02/18   1050  04/04/18   1510  02/07/18   1008  01/22/18   1018 01/15/18  12/07/17   1157  11/25/17   1007   08/30/17   1214   06/03/16   0756   05/22/14   0821   LDL   --    --    --    --    --    --    --    --   76   --   109*   --   97   HDL   --    --    --    --    --    --    --    --   50   --   47*   --   42*   TRIG   --    --    --    --    --    --    --    --   101   --   75   --   84   ALT  28   --   35   --    --    --   32   < >   --    < >  36   < >   --    CR  1.36*   --   1.43*  1.26*  1.59*  1.26*  1.60*   < >   --    < >  1.44*  "  < >  1.21*   GFRESTIMATED  37*   --   35*  40*  31*  40*  31*   < >   --    < >  35*   < >  43*   GFRESTBLACK  45*   --   42*  49*  37*  49*  37*   < >   --    < >  42*   < >  52*   POTASSIUM   --    --    --   4.3  4.8   --   5.0   < >   --    < >  5.1   < >  4.1   TSH   --   0.53   --    --    --   0.89   --    --   0.77   < >   --    < >  0.58    < > = values in this interval not displayed.      BP Readings from Last 3 Encounters:   07/27/18 138/64   07/18/18 158/70   07/12/18 169/76    Wt Readings from Last 3 Encounters:   07/27/18 164 lb 9.6 oz (74.7 kg)   07/18/18 165 lb (74.8 kg)   07/12/18 165 lb 6.4 oz (75 kg)                 ROS:  CONSTITUTIONAL: NEGATIVE for fever, chills, change in weight  INTEGUMENTARY/SKIN: NEGATIVE for worrisome rashes, moles or lesions  EYES: NEGATIVE for vision changes or irritation  ENT/MOUTH: NEGATIVE for ear, mouth and throat problems  RESP: NEGATIVE for significant cough or SOB  CV: NEGATIVE for chest pain, palpitations or peripheral edema  GI: NEGATIVE for nausea, abdominal pain, heartburn, or change in bowel habits  : NEGATIVE for frequency, dysuria, or hematuria  MUSCULOSKELETAL: NEGATIVE for significant arthralgias or myalgia  NEURO: As above.  ENDOCRINE: NEGATIVE for temperature intolerance, skin/hair changes  HEME: NEGATIVE for bleeding problems  PSYCHIATRIC: NEGATIVE for changes in mood or affect    OBJECTIVE:   /64 (BP Location: Left arm, Patient Position: Sitting, Cuff Size: Adult Regular)  Pulse 60  Temp 98.2  F (36.8  C) (Oral)  Resp 16  Ht 5' 4\" (1.626 m)  Wt 164 lb 9.6 oz (74.7 kg)  LMP  (LMP Unknown)  SpO2 98%  BMI 28.25 kg/m2  Body mass index is 28.25 kg/(m^2).  GENERAL: healthy, alert and no distress  EYES: Eyes grossly normal to inspection, PERRL and conjunctivae and sclerae normal  HENT: ear canals and TM's normal, nose and mouth without ulcers or lesions  NECK: no adenopathy, no asymmetry, masses, or scars and thyroid normal to " palpation  RESP: lungs clear to auscultation - no rales, rhonchi or wheezes  CV: regular rate and rhythm, normal S1 S2, no S3 or S4, no murmur, click or rub, no peripheral edema and peripheral pulses strong  ABDOMEN: soft, nontender, no hepatosplenomegaly, no masses and bowel sounds normal  MS: no gross musculoskeletal defects noted, no edema  SKIN: no suspicious lesions or rashes  NEURO: Normal strength and tone, mentation intact and speech normal  PSYCH: mentation appears normal, affect normal/bright    Diagnostic Test Results:  none     ASSESSMENT/PLAN:     (H81.10) Benign paroxysmal positional vertigo, unspecified laterality  (primary encounter diagnosis)  Comment: Most probable cause of dizziness, not associated with tinnitus nor hearing loss.  Plan: Continue Meclizine and consider ENT consultation.    (I10) Hypertension goal BP (blood pressure) < 140/90  Comment: Hold Bumetanide and start thiazide diuretics for better BP control and may potentially relieve dizziness if patient has Meniere's disease.  Plan: triamterene-hydrochlorothiazide (DYAZIDE)         37.5-25 MG per capsule          Follow-up visit if condition worsens.    Tre Lockett MD  [facility]

## 2018-07-27 NOTE — MR AVS SNAPSHOT
After Visit Summary   7/27/2018    Linda Spicer    MRN: [id number]           Patient Information     Date Of Birth          6/13/1931        Visit Information        Provider Department      7/27/2018 10:00 AM Tre Lockett MD [facility]        Today's Diagnoses     Hypertension goal BP (blood pressure) < 140/90    -  1    Benign paroxysmal positional vertigo, unspecified laterality           Follow-ups after your visit        Your next 10 appointments already scheduled     Jul 31, 2018 10:00 AM CDT   Return Visit with Triny Michel   [facility] ([facility]    [address] [address]   [phone]            Jul 31, 2018 10:30 AM CDT   New Visit with Fredrick Myers MD   [facility] ([facility])    [address] [address]   [phone]            Aug 20, 2018 12:30 PM CDT   Return Visit with Emeterio Loza MD   [facility] ([facility]    [address] [place] [address]   [phone]            Aug 23, 2018  9:00 AM CDT   Level 1 with BAY 8 INFUSION   [facility] ([facility])    [address] [place] [address]   [phone]            Sep 05, 2018 10:00 AM CDT   Return Visit with DEVICE CHECK RN CARD   [facility] ([facility])    [address] [place] [address]   [phone]            Sep 11, 2018 10:00 AM CDT   Return Visit with Mario Davenport MD   [facility] ([facility])    [address] [address]   [phone]            Dec 11, 2018 10:00 AM CST   Office Visit with PFT LAB   [facility] ([facility])    [address] [place] [address]   [phone]            Bring a current list of meds and any records pertaining to this visit. For Physicals, please bring immunization records and any forms needing to be filled out. Please arrive 10 minutes early to complete paperwork.            Dec 11, 2018 11:00 AM CST   Return Visit with Dea Acosta MD   [facility] ([facility])    [address] [place] [id number] [phone]            Mar 01, 2019 10:00 AM CST   Return Visit with Asia Steven PA-C   [facility] ([facility])    [address] [place] [id number] [phone]              Who to contact     If you have questions or need follow up information about today's clinic visit or your schedule please contact [facility] directly at [phone].  Normal or non-critical lab and imaging results will be communicated to you by MyChart, letter or phone within 4 business days after the clinic has received the results. If you do not hear from us within 7 days, please contact the clinic through [employer] or phone. If you have a critical or abnormal lab result, we will notify you by phone as soon as possible.  Submit refill requests through [employer] or call your pharmacy and they will forward the refill request to us. Please allow 3 business days for your refill to be completed.          Additional Information About Your Visit        [employer] Information     [employer] gives you secure access to your electronic health record. If you see a primary care provider, you can also send messages to your care team and make appointments. If you have questions, please call your primary care clinic.  If you do not have a primary care provider, please call [phone] and they will assist you.        Care EveryWhere ID     This is your Care EveryWhere ID. This could be used by other organizations to access your [facility] medical records  [id number]        Your  "Vitals Were     Pulse Temperature Respirations Height Last Period Pulse Oximetry    60 98.2  F (36.8  C) (Oral) 16 5' 4\" (1.626 m) (LMP Unknown) 98%    BMI (Body Mass Index)                   28.25 kg/m2            Blood Pressure from Last 3 Encounters:   07/27/18 138/64   07/18/18 158/70   07/12/18 169/76    Weight from Last 3 Encounters:   07/27/18 164 lb 9.6 oz (74.7 kg)   07/18/18 165 lb (74.8 kg)   07/12/18 165 lb 6.4 oz (75 kg)              Today, you had the following     No orders found for display         Today's Medication Changes          These changes are accurate as of 7/27/18 10:46 AM.  If you have any questions, ask your nurse or doctor.               Start taking these medicines.        Dose/Directions    triamterene-hydrochlorothiazide 37.5-25 MG per capsule   Commonly known as:  DYAZIDE   Used for:  Hypertension goal BP (blood pressure) < 140/90   Started by:  Tre Lockett MD        Dose:  1 capsule   Take 1 capsule by mouth daily   Quantity:  30 capsule   Refills:  5         Stop taking these medicines if you haven't already. Please contact your care team if you have questions.     bumetanide 1 MG tablet   Commonly known as:  BUMEX   Stopped by:  Tre Lockett MD                Where to get your medicines      These medications were sent to [facility] [address] [place] [id number]     Phone:  [phone]     triamterene-hydrochlorothiazide 37.5-25 MG per capsule                Primary Care Provider Office Phone # Fax #    Tre Lockett [phone]       [address]  [facility] [address]        Goals        General    I will complete my health care directive. (pt-stated)     Notes - Note created  3/29/2018  3:07 PM by Behl, Melissa K, RN    Supporting Steps:    Pt has attended  a health care  directive clas-  s, is awaiting  to see her law-  elizabeth to finalize  the document.  10/24/17 " Pt  informed of CPR  vs intubation  and information  mailed out to  patient.             Equal Access to Services     [facility] : Haddejan Goyal, zofia quinn, qasabihaiglesia krausgigizahraa cotton. So [facility] [phone].    ATENCIÓN: Si habla español, tiene a merchant disposición servicios gratuitos de asistencia lingüística. Llame al [phone].    We comply with applicable federal civil rights laws and [place] laws. We do not discriminate on the basis of race, color, national origin, age, disability, sex, sexual orientation, or gender identity.            Thank you!     Thank you for choosing [facility]  for your care. Our goal is always to provide you with excellent care. Hearing back from our patients is one way we can continue to improve our services. Please take a few minutes to complete the written survey that you may receive in the mail after your visit with us. Thank you!             Your Updated Medication List - Protect others around you: Learn how to safely use, store and throw away your medicines at www.disposemymeds.org.          This list is accurate as of 7/27/18 10:46 AM.  Always use your most recent med list.                   Brand Name Dispense Instructions for use Diagnosis    albuterol (2.5 MG/3ML) 0.083% neb solution      Take 1 vial by nebulization every 6 hours as needed for shortness of breath / dyspnea or wheezing        apixaban ANTICOAGULANT 2.5 MG tablet    ELIQUIS    180 tablet    Take 1 tablet (2.5 mg) by mouth 2 times daily    Atrial flutter, paroxysmal (H)       azaTHIOprine 50 MG tablet    IMURAN    90 tablet    Take 1 tablet (50 mg) by mouth daily    Rheumatoid arthritis involving multiple sites with positive rheumatoid factor (H)       bismuth subsalicylate 262 MG chewable tablet    PEPTO BISMOL    100 tablet    Take 2 tablets (524 mg) by mouth 4 times daily as needed    Dyspepsia and disorder of function of  stomach       Blood Pressure Monitor Kit     1 kit    1 kit daily as needed    CHF (congestive heart failure) (H)       calcium-vitamin D 600-400 MG-UNIT per tablet    CALTRATE     Take 1 tablet by mouth 2 times daily        COMPOUNDED NON-CONTROLLED SUBSTANCE - PHARMACY TO MIX COMPOUNDED MEDICATION    CMPD RX    30 g    Estriol 1mg/gram. Place 1 gram vaginally daily for two weeks, then vaginally twice weekly after.    Atrophic vaginitis       Cranberry 180 MG Caps      Take 1 capsule by mouth daily Unsure of strength        fish oil-omega-3 fatty acids 1000 MG capsule      Take 2 g by mouth daily. 2 capsules daily        FLAGYL PO      Take 500 mg by mouth 2 times daily    Acute infectious diarrhea       folic acid 1 MG tablet    FOLVITE    90 tablet    Take 1 tablet (1 mg) by mouth daily    Oral aphthae       GENTEAL MILD OP      Apply  to eye daily.        hydrocortisone 2.5 % cream     30 g    Apply BID to affected region(s) for 7-10 days.    Rash       levothyroxine 75 MCG tablet    SYNTHROID/LEVOTHROID    90 tablet    TAKE ONE TABLET BY MOUTH EVERY DAY    Hypothyroidism, unspecified type       loratadine 10 MG tablet    CLARITIN     Take 10 mg by mouth every morning        LORazepam 1 MG tablet    ATIVAN    30 tablet    Take 1 tablet (1 mg) by mouth 2 times daily    Anxiety       losartan 50 MG tablet    COZAAR    90 tablet    Take 1 tablet (50 mg) by mouth daily    Heart failure with preserved ejection fraction, NYHA class I (H)       meclizine 25 MG tablet    ANTIVERT     Take 25 mg by mouth as needed        methocarbamol 500 MG tablet    ROBAXIN     Take 250 mg by mouth        metoprolol succinate 25 MG 24 hr tablet    TOPROL-XL    90 tablet    Take 1 tablet (25 mg) by mouth daily    HTN (hypertension)       nitroGLYcerin 0.4 MG sublingual tablet    NITROSTAT    25 tablet    Place 1 tablet (0.4 mg) under the tongue every 5 minutes as needed for chest pain    Chest pain       * order for DME     1 Box     Equipment being ordered: Dispense face mask. Mrs. Spicer is immunosuppressed due to rheumatoid arthritis.    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 Use with Albuterol.    Hypoxia       order for DME     1 each    Equipment being ordered: Dispense baffle, for use with nebulizer.    Pneumonia of left upper lobe due to Mycoplasma pneumoniae       * order for DME     1 each    Dispense SP Walker-small    Pain of toe of right foot, Status post bunionectomy       oxyCODONE IR 5 MG tablet    ROXICODONE     Take 2.5 mg by mouth        PRESERVISION AREDS PO      Take 1 tablet by mouth daily        propafenone 150 MG Tabs tablet    RYTHMOL     Take 150 mg by mouth        ranibizumab 0.3 MG/0.05ML Soln    LUCENTIS     0.3 mg by Intravitreal route        ranitidine 150 MG tablet    ZANTAC    60 tablet    Take 1 tablet (150 mg) by mouth 2 times daily    Gastroesophageal reflux disease without esophagitis       REFRESH P.M. Oint      Apply  to eye. Daily at bedtime        saccharomyces boulardii 250 MG capsule    FLORASTOR     Take 250 mg by mouth daily        senna-docusate 8.6-50 MG per tablet    SENOKOT-S;PERICOLACE    120 tablet    Take 2 tablets by mouth At Bedtime Hold if diarrhea occurs.    Constipation, chronic       triamcinolone 0.1 % cream    KENALOG    453.6 g    Apply sparingly to affected area on face three times daily.    Facial lesion       triamterene-hydrochlorothiazide 37.5-25 MG per capsule    DYAZIDE    30 capsule    Take 1 capsule by mouth daily    Hypertension goal BP (blood pressure) < 140/90       VITAMIN C PO           ZYRTEC ALLERGY 10 MG tablet   Generic drug:  cetirizine      Take 10 mg by mouth At Bedtime        * Notice:  This list has 3 medication(s) that are the same as other medications prescribed for you. Read the directions carefully, and ask your doctor or other care provider to review  them with you.

## 2018-07-31 ENCOUNTER — OFFICE VISIT (OUTPATIENT)
Dept: OTOLARYNGOLOGY | Facility: CLINIC | Age: 83
End: 2018-07-31
Payer: MEDICARE

## 2018-07-31 ENCOUNTER — OFFICE VISIT (OUTPATIENT)
Dept: AUDIOLOGY | Facility: CLINIC | Age: 83
End: 2018-07-31
Payer: MEDICARE

## 2018-07-31 ENCOUNTER — TELEPHONE (OUTPATIENT)
Dept: UROLOGY | Facility: CLINIC | Age: 83
End: 2018-07-31

## 2018-07-31 DIAGNOSIS — R30.0 DYSURIA: Primary | ICD-10-CM

## 2018-07-31 DIAGNOSIS — R30.0 DYSURIA: ICD-10-CM

## 2018-07-31 DIAGNOSIS — H90.3 SENSORINEURAL HEARING LOSS (SNHL) OF BOTH EARS: ICD-10-CM

## 2018-07-31 DIAGNOSIS — G50.1 ATYPICAL FACIAL PAIN: ICD-10-CM

## 2018-07-31 DIAGNOSIS — H92.01 RIGHT EAR PAIN: Primary | ICD-10-CM

## 2018-07-31 DIAGNOSIS — H90.A31 MIXED CONDUCTIVE AND SENSORINEURAL HEARING LOSS OF RIGHT EAR WITH RESTRICTED HEARING OF LEFT EAR: Primary | ICD-10-CM

## 2018-07-31 PROBLEM — I48.92 ATRIAL FLUTTER (H): Status: ACTIVE | Noted: 2018-07-31

## 2018-07-31 LAB
ALBUMIN UR-MCNC: NEGATIVE MG/DL
APPEARANCE UR: CLEAR
BILIRUB UR QL STRIP: NEGATIVE
COLOR UR AUTO: YELLOW
GLUCOSE UR STRIP-MCNC: NEGATIVE MG/DL
HGB UR QL STRIP: NEGATIVE
KETONES UR STRIP-MCNC: NEGATIVE MG/DL
LEUKOCYTE ESTERASE UR QL STRIP: NEGATIVE
NITRATE UR QL: NEGATIVE
PH UR STRIP: 6 PH (ref 5–7)
SOURCE: NORMAL
SP GR UR STRIP: <=1.005 (ref 1–1.03)
UROBILINOGEN UR STRIP-ACNC: 0.2 EU/DL (ref 0.2–1)

## 2018-07-31 PROCEDURE — 92567 TYMPANOMETRY: CPT | Performed by: AUDIOLOGIST

## 2018-07-31 PROCEDURE — 92557 COMPREHENSIVE HEARING TEST: CPT | Performed by: AUDIOLOGIST

## 2018-07-31 PROCEDURE — 81003 URINALYSIS AUTO W/O SCOPE: CPT | Performed by: PHYSICIAN ASSISTANT

## 2018-07-31 PROCEDURE — 99207 ZZC NO CHARGE LOS: CPT | Performed by: AUDIOLOGIST

## 2018-07-31 PROCEDURE — 99213 OFFICE O/P EST LOW 20 MIN: CPT | Performed by: OTOLARYNGOLOGY

## 2018-07-31 RX ORDER — PROPAFENONE HYDROCHLORIDE 150 MG/1
150 TABLET, COATED ORAL 3 TIMES DAILY
Qty: 90 TABLET | Refills: 3 | Status: SHIPPED | OUTPATIENT
Start: 2018-07-31 | End: 2018-09-17 | Stop reason: ALTCHOICE

## 2018-07-31 RX ORDER — CYCLOBENZAPRINE HCL 5 MG
5 TABLET ORAL 3 TIMES DAILY PRN
Qty: 60 TABLET | Refills: 3 | Status: SHIPPED | OUTPATIENT
Start: 2018-07-31 | End: 2018-08-20

## 2018-07-31 NOTE — TELEPHONE ENCOUNTER
7/31/18 UA results negative. Attempted to reach patient, but per family patient is taking a nap at this time. Family requested for writer to return call to patient in about 45 minutes.    Sena Chapman RN, BSN

## 2018-07-31 NOTE — TELEPHONE ENCOUNTER
Date: 7/31/2018    Time of Call: 7:59 AM     Diagnosis:  Aflutter     [ VORB ] Ordering provider: Dr Emeterio Loza    Order: OK to continue Propafenone.     Order received by: Ab PAZ     Follow-up/additional notes: Spoke to patient. She will continue propafenone. Refill sent to pharmacy . Will see her for follow up as scheduled on 8/20.

## 2018-07-31 NOTE — PROGRESS NOTES
"History of Present Illness - Linda Spicer is a 87 year old female last seen on 10/11/2016, but she had seen Dr. Massey on 4/1/2015 for dizziness.  She saw me at that point due to continued ringing and pressure in the RIGHT ear.  However, she was seen a year prior due to feeling off balance all the time and every day.  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  She did not follow up with ENT because once her HTN medication was changed, it is totally fine.    The new issue is pressure and \"whistling\" in the RIGHT ear.  This started a few months ago, and it was most notable when she would wake up in the morning, only in the RIGHT ear.  There was no pain or draiange.  Fortunately, as of about a week its gone now.  She does mention that she had a bad viral URI and subsequent pneumonia in April, and it did start around then.  All I could find was some mild eustachian tube dysfunction, but no change in sensorineural hearing loss.  She was lost to follow up after that.    She is being seen today for a new reason, at the consult of Dr. Lockett. The reason for her return is that she had an accident and fell on July 2.  She tells me that she broke her S3 and also fell backward and hit her head on the mirror on the wall.  This was at a work out center.  She was eavlauted at the ER.  She had severe back pain. They scanned her head, and it was normal, and it was rescanned at [place], and it was normal.  She continues to have a \"numb fullness in the RIGHT side of the head around the ear.    Past Medical History -   Patient Active Problem List   Diagnosis     ACP (advance care planning)     Hypertension goal BP (blood pressure) < 150/90     Hypothyroidism     Macular degeneration, left eye     Irritable bowel syndrome     Encounter for palliative care     Adjustment disorder with anxious mood     Mild anemia     DDD (degenerative disc disease), lumbar     CKD (chronic kidney " disease) stage 3, GFR 30-59 ml/min     History of blood transfusion     Aftercare following surgery     S/P lumbar laminectomy     High risk medication use     Age-related osteoporosis without current pathological fracture     Atrophic vaginitis     Fecal incontinence     Female stress incontinence     Impingement syndrome of both shoulders     UIP (usual interstitial pneumonitis) (H)     High risk medications (not anticoagulants) long-term use     Heart failure with preserved ejection fraction (H)     Congestive heart failure with preserved LV function, NYHA class 3 (H)     Other specified hypothyroidism     Rheumatoid arthritis involving multiple sites with positive rheumatoid factor (H)     Health Care Home     Sick sinus syndrome (H)     Cardiac pacemaker - Medtronic dual lead pacemaker - Not Dependent - MRI Safe     Immunosuppression (H)     ILD (interstitial lung disease) (H)     Heart failure with preserved ejection fraction, NYHA class I (H)     Atrial flutter (H)       Current Medications -   Current Outpatient Prescriptions:      albuterol (2.5 MG/3ML) 0.083% neb solution, Take 1 vial by nebulization every 6 hours as needed for shortness of breath / dyspnea or wheezing, Disp: , Rfl:      apixaban ANTICOAGULANT (ELIQUIS) 2.5 MG tablet, Take 1 tablet (2.5 mg) by mouth 2 times daily, Disp: 180 tablet, Rfl: 3     Artificial Tear Ointment (REFRESH P.M.) OINT, Apply  to eye. Daily at bedtime  , Disp: , Rfl:      Ascorbic Acid (VITAMIN C PO), , Disp: , Rfl:      azaTHIOprine (IMURAN) 50 MG tablet, Take 1 tablet (50 mg) by mouth daily, Disp: 90 tablet, Rfl: 2     bismuth subsalicylate (PEPTO BISMOL) 262 MG chewable tablet, Take 2 tablets (524 mg) by mouth 4 times daily as needed, Disp: 100 tablet, Rfl: 11     Blood Pressure Monitor KIT, 1 kit daily as needed, Disp: 1 kit, Rfl: 0     calcium-vitamin D (CALTRATE) 600-400 MG-UNIT per tablet, Take 1 tablet by mouth 2 times daily , Disp: , Rfl:      cetirizine (ZYRTEC  ALLERGY) 10 MG tablet, Take 10 mg by mouth At Bedtime, Disp: , Rfl:      COMPOUNDED NON-CONTROLLED SUBSTANCE (CMPD RX) - PHARMACY TO MIX COMPOUNDED MEDICATION, Estriol 1mg/gram. Place 1 gram vaginally daily for two weeks, then vaginally twice weekly after., Disp: 30 g, Rfl: 6     Cranberry 180 MG CAPS, Take 1 capsule by mouth daily Unsure of strength, Disp: , Rfl:      fish oil-omega-3 fatty acids (FISH OIL) 1000 MG capsule, Take 2 g by mouth daily. 2 capsules daily   , Disp: , Rfl:      folic acid (FOLVITE) 1 MG tablet, Take 1 tablet (1 mg) by mouth daily, Disp: 90 tablet, Rfl: 3     hydrocortisone 2.5 % cream, Apply BID to affected region(s) for 7-10 days., Disp: 30 g, Rfl: 0     Hypromellose (GENTEAL MILD OP), Apply  to eye daily., Disp: , Rfl:      levothyroxine (SYNTHROID/LEVOTHROID) 75 MCG tablet, TAKE ONE TABLET BY MOUTH EVERY DAY, Disp: 90 tablet, Rfl: 2     loratadine (CLARITIN) 10 MG tablet, Take 10 mg by mouth every morning, Disp: , Rfl:      LORazepam (ATIVAN) 1 MG tablet, Take 1 tablet (1 mg) by mouth 2 times daily, Disp: 30 tablet, Rfl: 0     losartan (COZAAR) 50 MG tablet, Take 1 tablet (50 mg) by mouth daily, Disp: 90 tablet, Rfl: 1     meclizine (ANTIVERT) 25 MG tablet, Take 25 mg by mouth as needed, Disp: , Rfl:      methocarbamol (ROBAXIN) 500 MG tablet, Take 250 mg by mouth, Disp: , Rfl:      metoprolol succinate (TOPROL-XL) 25 MG 24 hr tablet, Take 1 tablet (25 mg) by mouth daily, Disp: 90 tablet, Rfl: 3     MetroNIDAZOLE (FLAGYL PO), Take 500 mg by mouth 2 times daily , Disp: , Rfl:      Multiple Vitamins-Minerals (PRESERVISION AREDS PO), Take 1 tablet by mouth daily , Disp: , Rfl:      nitroglycerin (NITROSTAT) 0.4 MG SL tablet, Place 1 tablet (0.4 mg) under the tongue every 5 minutes as needed for chest pain, Disp: 25 tablet, Rfl: 0     order for DME, Dispense SP Walker-small, Disp: 1 each, Rfl: 0     order for DME, Equipment being ordered: Dispense baffle, for use with nebulizer., Disp: 1  each, Rfl: 0     order for DME, Equipment being ordered: Nebulizer. Use with Albuterol., Disp: 1 each, Rfl: 0     order for DME, Equipment being ordered: Dispense face mask. Mrs. Spicer is immunosuppressed due to rheumatoid arthritis., Disp: 1 Box, Rfl: 11     oxyCODONE IR (ROXICODONE) 5 MG tablet, Take 2.5 mg by mouth, Disp: , Rfl:      propafenone (RYTHMOL) 150 MG TABS tablet, Take 1 tablet (150 mg) by mouth 3 times daily, Disp: 90 tablet, Rfl: 3     ranibizumab (LUCENTIS) 0.3 MG/0.05ML SOLN, 0.3 mg by Intravitreal route, Disp: , Rfl:      ranitidine (ZANTAC) 150 MG tablet, Take 1 tablet (150 mg) by mouth 2 times daily, Disp: 60 tablet, Rfl: 5     saccharomyces boulardii (FLORASTOR) 250 MG capsule, Take 250 mg by mouth daily, Disp: , Rfl:      senna-docusate (SENOKOT-S;PERICOLACE) 8.6-50 MG per tablet, Take 2 tablets by mouth At Bedtime Hold if diarrhea occurs., Disp: 120 tablet, Rfl: 11     triamcinolone (KENALOG) 0.1 % cream, Apply sparingly to affected area on face three times daily., Disp: 453.6 g, Rfl: 5     triamterene-hydrochlorothiazide (DYAZIDE) 37.5-25 MG per capsule, Take 1 capsule by mouth daily, Disp: 30 capsule, Rfl: 5  No current facility-administered medications for this visit.     Facility-Administered Medications Ordered in Other Visits:      DOBUTamine 500 mg in dextrose 5% 250 mL (adult std), 15 mcg/kg/min, Intravenous, Continuous, Parag Park MD     DOBUTamine 500 mg in dextrose 5% 250 mL (adult std), 2.5-20 mcg/kg/min, Intravenous, Continuous, Parag Park MD    Allergies -   Allergies   Allergen Reactions     Cephalexin Hcl Diarrhea     Gabapentin Other (See Comments)     Dizzsiness     Naproxen GI Disturbance     Perfume      Lactase Other (See Comments)     Macrobid [Nitrofurantoin Anhydrous]      Possibly related to lung disease      Seasonal Allergies      Sulfa Drugs      Throat swelling     Xanax [Alprazolam] Other (See Comments)     Dizziness      Ciprofloxacin Itching  and Rash       Social History -   Social History     Social History     Marital status:      Spouse name: N/A     Number of children: N/A     Years of education: N/A     Social History Main Topics     Smoking status: Never Smoker     Smokeless tobacco: Never Used     Alcohol use Yes      Comment: rare wine      Drug use: No     Sexual activity: No     Other Topics Concern     Not on file     Social History Narrative    . Has six children. She enjoys bridge and genealogy. Her  has cancer. Her son has financial and alcohol issues.       Family History -   Family History   Problem Relation Age of Onset     Hypertension Mother      Psychotic Disorder Father      Diabetes Son      Diabetes Daughter      Blood Disease Daughter        Review of Systems - As per HPI and PMHx, otherwise 10+ system review of the head and neck, and general constitution is negative.    Physical Exam  LMP  (LMP Unknown)    General - The patient is well nourished and well developed, and appears to have good nutritional status.  Alert and oriented to person and place, answers questions and cooperates with examination appropriately.   Head and Face - Normocephalic and atraumatic, with no gross asymmetry noted of the contour of the facial features.  The facial nerve is intact, with strong symmetric movements.  Voice and Breathing - The patient was breathing comfortably without the use of accessory muscles. There was no wheezing, stridor, or stertor.  The patients voice was clear and strong, and had appropriate pitch and quality.  Ears - The tympanic membranes are normal in appearance, bony landmarks are intact.  No retraction, perforation, or masses.  No fluid or purulence was seen in the external canal or the middle ear. No evidence of infection of the middle ear or external canal, cerumen was normal in appearance.  Eyes - Extraocular movements intact, and the pupils were reactive to light.  Sclera were not icteric or injected,  conjunctiva were pink and moist.  Mouth - Examination of the oral cavity showed pink, healthy oral mucosa. No lesions or ulcerations noted.  The tongue was mobile and midline, and the dentition were in good condition.    Throat - The walls of the oropharynx were smooth, pink, moist, symmetric, and had no lesions or ulcerations.  The tonsillar pillars and soft palate were symmetric.  The uvula was midline on elevation.    Neck - Normal midline excursion of the laryngotracheal complex during swallowing.  Full range of motion on passive movement.  Palpation of the occipital, submental, submandibular, internal jugular chain, and supraclavicular nodes did not demonstrate any abnormal lymph nodes or masses.  The carotid pulse was palpable bilaterally.  Palpation of the thyroid was soft and smooth, with no nodules or goiter appreciated.  The trachea was mobile and midline.  Nose - External contour is symmetric, no gross deflection or scars.  Nasal mucosa is pink and moist with no abnormal mucus.  The septum was midline and non-obstructive, turbinates of normal size and position.  No polyps, masses, or purulence noted on examination.    Audiologic Studies - An audiogram and tympanogram were performed today as part of the evaluation and personally reviewed. The tympanogram shows a normal Type A curve, with normal canal volume and middle ear pressure.  There is no sign of eustachian tube dysfunction or middle ear effusion.  The audiogram shows a gentle down sloping sensorineural hearing loss that is symmetric and unchanged from her audiogram in 2016.      A/P - Linda Spicer is a 87 year old female  (H92.01) Right ear pain  (primary encounter diagnosis)  (H90.3) Sensorineural hearing loss (SNHL) of both ears  (G50.1) Atypical facial pain    Based on today's history and physical exam, I can find no evidence of middle ear pathology or eustachian tube dysfunction.  At this point my primary diagnosis is of temporomandibular  syndrome.  I have discussed the etiology of TMJ, and the reasons why referred pain can mimic symptoms of ear disease, headaches, and even sinusitis.  i have given the patient an instructional sheet of things to be tried at home, as well a referral to a TMJ specialist should it be needed.  Finally, I counseled the patient that should the therapy not help, or should the symptoms change, that they should return to me.

## 2018-07-31 NOTE — TELEPHONE ENCOUNTER
"Called and spoke to patient who is aware of the 7/31/18 UA results. Patient stated, \"That puts my mind at ease.\" patient reports that she hasn't been doing the estrogen cream lately but will resume it again now. Informed patient to call with any questions or concerns.    Sena Chapman RN, BSN    "

## 2018-07-31 NOTE — MR AVS SNAPSHOT
After Visit Summary   7/31/2018    Linda Spicer    MRN: [id number]           Patient Information     Date Of Birth          6/13/1931        Visit Information        Provider Department      7/31/2018 10:30 AM Fredrick Myers MD [facility]        Today's Diagnoses     Right ear pain    -  1    Sensorineural hearing loss (SNHL) of both ears        Atypical facial pain          Care Instructions    Scheduling Information  To schedule your CT/MRI scan, please contact [facility] at [phone] OR [facility] at [phone]    To schedule your Surgery, please contact our Specialty Schedulers at [phone]      ENT Clinic Locations Clinic Hours Telephone Number     [facility]  [address]  [place] [address]   Monday:           1:00pm -- 5:00pm    Friday:              8:00am - 12:00pm   To schedule/reschedule an appointment with   Dr. Myers,   please contact our   Specialty Scheduling Department at:     [phone]       [facility]  [address] [place] [address] Tuesday:          8:00am -- 2:00pm         Urgent Care Locations Clinic Hours Telephone Numbers     [facility]  [address] [place] [address]     Monday-Friday:     11:00am - 9:00pm    Saturday-Sunday:  9:00am - 5:00pm   [phone]     [facility]  [address] [place] [address]. [place] [address]     Monday-Friday:      5:00pm - 9:00pm     Saturday-Sunday:  9:00am - 5:00pm   [phone]                 Follow-ups after your visit        Your next 10 appointments already scheduled     Aug 09, 2018  9:00 AM CDT   Office Visit with Tre Lockett MD   [facility] ([facility])    [address] [facility] [id number] [phone]           Bring a current list of meds and any records pertaining to this visit. For Physicals, please bring immunization records and any forms needing to  be filled out. Please arrive 10 minutes early to complete paperwork.            Aug 15, 2018  9:00 AM CDT   LAB with LAB ONC [facility] ([facility])    [address] [place] [address]   [phone]           Please do not eat 10-12 hours before your appointment if you are coming in fasting for labs on lipids, cholesterol, or glucose (sugar). This does not apply to pregnant women. Water, hot tea and black coffee (with nothing added) are okay. Do not drink other fluids, diet soda or chew gum.            Aug 15, 2018  9:30 AM CDT   Level 1 with [place] [facility] ([facility])    [address] [place] [address]   [phone]            Aug 20, 2018 12:30 PM CDT   Return Visit with Emeterio Loza MD   [facility])    [address] [place] [address]   [phone]            Sep 05, 2018 10:00 AM CDT   Return Visit with DEVICE CHECK RN CARD   [facility])    [address] [place] [address]   [phone]            Sep 11, 2018 10:00 AM CDT   Return Visit with Mario Davenport MD   [facility]    [address] [facility] [address]   [phone]            Sep 13, 2018  9:30 AM CDT   Level 1 with [place] [address] [facility] ([facility])    [address] [place] [address]   [phone]            Dec 11, 2018 10:00 AM CST   Office Visit with PFT LAB   [facility])    [address] [facility] [address]   [phone]           Bring a current list of meds and any records pertaining to this visit. For Physicals, please bring immunization records and any forms needing to be filled out. Please arrive 10  minutes early to complete paperwork.            Dec 11, 2018 11:00 AM CST   Return Visit with Dea Acosta MD   [facility] ([facility])    [address] [place] [address] [phone]            Mar 01, 2019 10:00 AM CST   Return Visit with Asia Steven PA-C   [facility] ([facility])    [address] [place] [address] [phone]              Who to contact     If you have questions or need follow up information about today's clinic visit or your schedule please contact [facility] directly at [phone].  Normal or non-critical lab and imaging results will be communicated to you by MyChart, letter or phone within 4 business days after the clinic has received the results. If you do not hear from us within 7 days, please contact the clinic through MyChart or phone. If you have a critical or abnormal lab result, we will notify you by phone as soon as possible.  Submit refill requests through [employer] or call your pharmacy and they will forward the refill request to us. Please allow 3 business days for your refill to be completed.          Additional Information About Your Visit        EnGeneIChart Information     LYCEEM gives you secure access to your electronic health record. If you see a primary care provider, you can also send messages to your care team and make appointments. If you have questions, please call your primary care clinic.  If you do not have a primary care provider, please call [phone] and they will assist you.        Care EveryWhere ID     This is your Care EveryWhere ID. This could be used by other organizations to access your [place] medical records  [id number]        Your Vitals Were     Last Period                   (LMP Unknown)            Blood Pressure from Last 3 Encounters:   07/27/18 138/64   07/18/18 158/70   07/12/18 169/76    Weight from Last 3  Encounters:   07/27/18 74.7 kg (164 lb 9.6 oz)   07/18/18 74.8 kg (165 lb)   07/12/18 75 kg (165 lb 6.4 oz)              Today, you had the following     No orders found for display         Today's Medication Changes          These changes are accurate as of 7/31/18 11:01 AM.  If you have any questions, ask your nurse or doctor.               Start taking these medicines.        Dose/Directions    cyclobenzaprine 5 MG tablet   Commonly known as:  FLEXERIL   Used for:  Atypical facial pain, Right ear pain   Started by:  Fredrick Myers MD        Dose:  5 mg   Take 1 tablet (5 mg) by mouth 3 times daily as needed for muscle spasms   Quantity:  60 tablet   Refills:  3            Where to get your medicines      These medications were sent to [facility] [address] [place] [address]     Phone:  [phone]     cyclobenzaprine 5 MG tablet                Primary Care Provider Office Phone # Fax #    Tre Lockett [phone]       [address]  [facility] [address]        Goals        General    I will complete my health care directive. (pt-stated)     Notes - Note created  3/29/2018  3:07 PM by Behl, Melissa K, RN    Supporting Steps:    Pt has attended  a health care  directive clas-  s, is awaiting  to see her law-  elizabeth to finalize  the document.  10/24/17 Pt  informed of CPR  vs intubation  and information  mailed out to  patient.             Equal Access to Services     [facility] : Hadii aad ku hadasho Soomaali, waaxda luqadaha, qaybta kaalmada adeegyada, waxay marisabel skinner. So [facility] [phone].    ATENCIÓN: Si habla español, tiene a merchant disposición servicios gratuitos de asistencia lingüística. Llame al [phone].    We comply with applicable federal civil rights laws and [place] laws. We do not discriminate on the basis of race, color, national origin, age, disability, sex,  sexual orientation, or gender identity.            Thank you!     Thank you for choosing [facility]  for your care. Our goal is always to provide you with excellent care. Hearing back from our patients is one way we can continue to improve our services. Please take a few minutes to complete the written survey that you may receive in the mail after your visit with us. Thank you!             Your Updated Medication List - Protect others around you: Learn how to safely use, store and throw away your medicines at www.disposemymeds.org.          This list is accurate as of 7/31/18 11:01 AM.  Always use your most recent med list.                   Brand Name Dispense Instructions for use Diagnosis    albuterol (2.5 MG/3ML) 0.083% neb solution      Take 1 vial by nebulization every 6 hours as needed for shortness of breath / dyspnea or wheezing        apixaban ANTICOAGULANT 2.5 MG tablet    ELIQUIS    180 tablet    Take 1 tablet (2.5 mg) by mouth 2 times daily    Atrial flutter, paroxysmal (H)       azaTHIOprine 50 MG tablet    IMURAN    90 tablet    Take 1 tablet (50 mg) by mouth daily    Rheumatoid arthritis involving multiple sites with positive rheumatoid factor (H)       bismuth subsalicylate 262 MG chewable tablet    PEPTO BISMOL    100 tablet    Take 2 tablets (524 mg) by mouth 4 times daily as needed    Dyspepsia and disorder of function of stomach       Blood Pressure Monitor Kit     1 kit    1 kit daily as needed    CHF (congestive heart failure) (H)       calcium-vitamin D 600-400 MG-UNIT per tablet    CALTRATE     Take 1 tablet by mouth 2 times daily        COMPOUNDED NON-CONTROLLED SUBSTANCE - PHARMACY TO MIX COMPOUNDED MEDICATION    CMPD RX    30 g    Estriol 1mg/gram. Place 1 gram vaginally daily for two weeks, then vaginally twice weekly after.    Atrophic vaginitis       Cranberry 180 MG Caps      Take 1 capsule by mouth daily Unsure of strength        cyclobenzaprine 5 MG tablet     FLEXERIL    60 tablet    Take 1 tablet (5 mg) by mouth 3 times daily as needed for muscle spasms    Atypical facial pain, Right ear pain       fish oil-omega-3 fatty acids 1000 MG capsule      Take 2 g by mouth daily. 2 capsules daily        FLAGYL PO      Take 500 mg by mouth 2 times daily    Acute infectious diarrhea       folic acid 1 MG tablet    FOLVITE    90 tablet    Take 1 tablet (1 mg) by mouth daily    Oral aphthae       GENTEAL MILD OP      Apply  to eye daily.        hydrocortisone 2.5 % cream     30 g    Apply BID to affected region(s) for 7-10 days.    Rash       levothyroxine 75 MCG tablet    SYNTHROID/LEVOTHROID    90 tablet    TAKE ONE TABLET BY MOUTH EVERY DAY    Hypothyroidism, unspecified type       loratadine 10 MG tablet    CLARITIN     Take 10 mg by mouth every morning        LORazepam 1 MG tablet    ATIVAN    30 tablet    Take 1 tablet (1 mg) by mouth 2 times daily    Anxiety       losartan 50 MG tablet    COZAAR    90 tablet    Take 1 tablet (50 mg) by mouth daily    Heart failure with preserved ejection fraction, NYHA class I (H)       meclizine 25 MG tablet    ANTIVERT     Take 25 mg by mouth as needed        methocarbamol 500 MG tablet    ROBAXIN     Take 250 mg by mouth        metoprolol succinate 25 MG 24 hr tablet    TOPROL-XL    90 tablet    Take 1 tablet (25 mg) by mouth daily    HTN (hypertension)       nitroGLYcerin 0.4 MG sublingual tablet    NITROSTAT    25 tablet    Place 1 tablet (0.4 mg) under the tongue every 5 minutes as needed for chest pain    Chest pain       * order for DME     1 Box    Equipment being ordered: Dispense face mask. Mrs. Spicer is immunosuppressed due to rheumatoid arthritis.    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 Use with Albuterol.    Hypoxia       order for DME     1 each    Equipment being ordered: Dispense baffle, for use with nebulizer.     Pneumonia of left upper lobe due to Mycoplasma pneumoniae       * order for DME     1 each    Dispense SP Walker-small    Pain of toe of right foot, Status post bunionectomy       oxyCODONE IR 5 MG tablet    ROXICODONE     Take 2.5 mg by mouth        PRESERVISION AREDS PO      Take 1 tablet by mouth daily        propafenone 150 MG Tabs tablet    RYTHMOL    90 tablet    Take 1 tablet (150 mg) by mouth 3 times daily    Atrial flutter (H)       ranibizumab 0.3 MG/0.05ML Soln    LUCENTIS     0.3 mg by Intravitreal route        ranitidine 150 MG tablet    ZANTAC    60 tablet    Take 1 tablet (150 mg) by mouth 2 times daily    Gastroesophageal reflux disease without esophagitis       REFRESH P.M. Oint      Apply  to eye. Daily at bedtime        saccharomyces boulardii 250 MG capsule    FLORASTOR     Take 250 mg by mouth daily        senna-docusate 8.6-50 MG per tablet    SENOKOT-S;PERICOLACE    120 tablet    Take 2 tablets by mouth At Bedtime Hold if diarrhea occurs.    Constipation, chronic       triamcinolone 0.1 % cream    KENALOG    453.6 g    Apply sparingly to affected area on face three times daily.    Facial lesion       triamterene-hydrochlorothiazide 37.5-25 MG per capsule    DYAZIDE    30 capsule    Take 1 capsule by mouth daily    Hypertension goal BP (blood pressure) < 140/90       VITAMIN C PO           ZYRTEC ALLERGY 10 MG tablet   Generic drug:  cetirizine      Take 10 mg by mouth At Bedtime        * Notice:  This list has 3 medication(s) that are the same as other medications prescribed for you. Read the directions carefully, and ask your doctor or other care provider to review them with you.

## 2018-07-31 NOTE — PROGRESS NOTES
AUDIOLOGY REPORT:    Patient was referred to Audiology from ENT by Mando Myers MD for a hearing examination.  She fell and hit her head about a month ago and since then her right ear feels plugged up.    Testing:    Otoscopy:   Otoscopic exam indicates ears are clear of cerumen bilaterally     Tympanograms:    RIGHT: Could not seal, due to very narrow ear canal.     LEFT:   normal eardrum mobility      Thresholds:   Pure Tone Thresholds assessed using conventional audiometry with good  reliability from 250-8000 Hz bilaterally using insert earphones     RIGHT:  moderate-severe mixed hearing loss    LEFT:    mild-moderate sensorineural hearing loss    Speech Reception Threshold:    RIGHT: 50 dB HL    LEFT:   40 dB HL    Word Recognition Score:     RIGHT: 100% at 85 dB HL using NU-6 recorded word list.    LEFT:   100% at 80 dB HL using NU-6 recorded word list.    Compared with previous audiogram on 10/11/16, there has been a decrease in the right ear hearing, with the change being conductive in nature.  Discussed results with the patient.     Patient was returned to ENT for follow up.  Should the hearing in the right ear not recover through medical intervention, patient should return to Audiology for an adjustment of her right hearing aid and possibly a custom earmold.    Tam Weber MA, CCC-A  [place] Licensed Audiologist #[id number]  7/31/2018

## 2018-07-31 NOTE — MR AVS SNAPSHOT
After Visit Summary   7/31/2018    Linda Spicer    MRN: [id number]           Patient Information     Date Of Birth          6/13/1931        Visit Information        Provider Department      7/31/2018 10:00 AM Francisco Weber AuD [facility]        Today's Diagnoses     Mixed conductive and sensorineural hearing loss of right ear with restricted hearing of left ear    -  1       Follow-ups after your visit        Your next 10 appointments already scheduled     Aug 09, 2018  9:00 AM CDT   Office Visit with Tre Lockett MD   [facility] ([facility])    [address] [facility] [id number] [phone]           Bring a current list of meds and any records pertaining to this visit. For Physicals, please bring immunization records and any forms needing to be filled out. Please arrive 10 minutes early to complete paperwork.            Aug 15, 2018  9:00 AM CDT   LAB with LAB ONC [facility])    [address] [place] [address]   [phone]           Please do not eat 10-12 hours before your appointment if you are coming in fasting for labs on lipids, cholesterol, or glucose (sugar). This does not apply to pregnant women. Water, hot tea and black coffee (with nothing added) are okay. Do not drink other fluids, diet soda or chew gum.            Aug 15, 2018  9:30 AM CDT   Level 1 with [address] ([facility])    [address] [place] [address]   [phone]            Aug 20, 2018 12:30 PM CDT   Return Visit with Emeterio Loza MD   [facility])    [address] [place] [address]   [phone]            Sep 05, 2018 10:00 AM CDT   Return Visit with DEVICE CHECK RN CARD   [facility]  [facility])    [address] [place] [address]   [phone]            Sep 11, 2018 10:00 AM CDT   Return Visit with Mario Davenport MD   [facility])    [address] [facility] [address]   [phone]            Sep 13, 2018  9:30 AM CDT   Level 1 with BAY 8 INFUSION   [facility]    [address] [place] [address]   [phone]            Dec 11, 2018 10:00 AM CST   Office Visit with PFT LAB   [facility])    [address] [place] [address]   [phone]           Bring a current list of meds and any records pertaining to this visit. For Physicals, please bring immunization records and any forms needing to be filled out. Please arrive 10 minutes early to complete paperwork.            Dec 11, 2018 11:00 AM CST   [facility] Visit with Dea Acosta MD   [facility]    [address] [place] [address]   [phone]            Mar 01, 2019 10:00 AM CST   Return Visit with Asia Steven PA-C   [facility])    [address] [place] [address]   [phone]              Who to contact     If you have questions or need follow up information about today's clinic visit or your schedule please contact [facility] directly at [phone].  Normal or non-critical lab and imaging results will be communicated to you by MyChart, letter or phone within 4 business days after the clinic has received the results. If you do not hear from us within 7 days, please contact the clinic through MyChart or phone. If you have a critical or abnormal lab result, we will notify you by phone as soon as possible.  Submit refill requests through [employer] or call your pharmacy and they will  forward the refill request to us. Please allow 3 business days for your refill to be completed.          Additional Information About Your Visit        [employer] Information     [employer] gives you secure access to your electronic health record. If you see a primary care provider, you can also send messages to your care team and make appointments. If you have questions, please call your primary care clinic.  If you do not have a primary care provider, please call [phone] and they will assist you.        Care EveryWhere ID     This is your Care EveryWhere ID. This could be used by other organizations to access your [place] medical records  [id number]        Your Vitals Were     Last Period                   (LMP Unknown)            Blood Pressure from Last 3 Encounters:   07/27/18 138/64   07/18/18 158/70   07/12/18 169/76    Weight from Last 3 Encounters:   07/27/18 164 lb 9.6 oz (74.7 kg)   07/18/18 165 lb (74.8 kg)   07/12/18 165 lb 6.4 oz (75 kg)              We Performed the Following     AUDIOGRAM/TYMPANOGRAM - INTERFACE     COMPREHENSIVE HEARING TEST     TYMPANOMETRY        Primary Care Provider Office Phone # Fax #    Tre Lockett [phone]       [address] [facility] [address]        Goals        General    I will complete my health care directive. (pt-stated)     Notes - Note created  3/29/2018  3:07 PM by Behl, Melissa K, RN    Supporting Steps:    Pt has attended  a health care  directive clas-  s, is awaiting  to see her law-  elizabeth to finalize  the document.  10/24/17 Pt  informed of CPR  vs intubation  and information  mailed out to  patient.             Equal Access to Services     [facility]: Hadii aad mariely hadasho Soomaali, waaxda luqadaha, qaybta kaalmada adeegyada, zahraa skinner. So [facility] [phone].    ATENCIÓN: Si habla español, tiene a merchant disposición servicios gratuitos de asistencia lingüística. Llame al [phone].    We  comply with applicable federal civil rights laws and [place] laws. We do not discriminate on the basis of race, color, national origin, age, disability, sex, sexual orientation, or gender identity.            Thank you!     Thank you for choosing [facility]  for your care. Our goal is always to provide you with excellent care. Hearing back from our patients is one way we can continue to improve our services. Please take a few minutes to complete the written survey that you may receive in the mail after your visit with us. Thank you!             Your Updated Medication List - Protect others around you: Learn how to safely use, store and throw away your medicines at www.disposemymeds.org.          This list is accurate as of 7/31/18 10:41 AM.  Always use your most recent med list.                   Brand Name Dispense Instructions for use Diagnosis    albuterol (2.5 MG/3ML) 0.083% neb solution      Take 1 vial by nebulization every 6 hours as needed for shortness of breath / dyspnea or wheezing        apixaban ANTICOAGULANT 2.5 MG tablet    ELIQUIS    180 tablet    Take 1 tablet (2.5 mg) by mouth 2 times daily    Atrial flutter, paroxysmal (H)       azaTHIOprine 50 MG tablet    IMURAN    90 tablet    Take 1 tablet (50 mg) by mouth daily    Rheumatoid arthritis involving multiple sites with positive rheumatoid factor (H)       bismuth subsalicylate 262 MG chewable tablet    PEPTO BISMOL    100 tablet    Take 2 tablets (524 mg) by mouth 4 times daily as needed    Dyspepsia and disorder of function of stomach       Blood Pressure Monitor Kit     1 kit    1 kit daily as needed    CHF (congestive heart failure) (H)       calcium-vitamin D 600-400 MG-UNIT per tablet    CALTRATE     Take 1 tablet by mouth 2 times daily        COMPOUNDED NON-CONTROLLED SUBSTANCE - PHARMACY TO MIX COMPOUNDED MEDICATION    CMPD RX    30 g    Estriol 1mg/gram. Place 1 gram vaginally daily for two weeks, then vaginally twice  weekly after.    Atrophic vaginitis       Cranberry 180 MG Caps      Take 1 capsule by mouth daily Unsure of strength        fish oil-omega-3 fatty acids 1000 MG capsule      Take 2 g by mouth daily. 2 capsules daily        FLAGYL PO      Take 500 mg by mouth 2 times daily    Acute infectious diarrhea       folic acid 1 MG tablet    FOLVITE    90 tablet    Take 1 tablet (1 mg) by mouth daily    Oral aphthae       GENTEAL MILD OP      Apply  to eye daily.        hydrocortisone 2.5 % cream     30 g    Apply BID to affected region(s) for 7-10 days.    Rash       levothyroxine 75 MCG tablet    SYNTHROID/LEVOTHROID    90 tablet    TAKE ONE TABLET BY MOUTH EVERY DAY    Hypothyroidism, unspecified type       loratadine 10 MG tablet    CLARITIN     Take 10 mg by mouth every morning        LORazepam 1 MG tablet    ATIVAN    30 tablet    Take 1 tablet (1 mg) by mouth 2 times daily    Anxiety       losartan 50 MG tablet    COZAAR    90 tablet    Take 1 tablet (50 mg) by mouth daily    Heart failure with preserved ejection fraction, NYHA class I (H)       meclizine 25 MG tablet    ANTIVERT     Take 25 mg by mouth as needed        methocarbamol 500 MG tablet    ROBAXIN     Take 250 mg by mouth        metoprolol succinate 25 MG 24 hr tablet    TOPROL-XL    90 tablet    Take 1 tablet (25 mg) by mouth daily    HTN (hypertension)       nitroGLYcerin 0.4 MG sublingual tablet    NITROSTAT    25 tablet    Place 1 tablet (0.4 mg) under the tongue every 5 minutes as needed for chest pain    Chest pain       * order for DME     1 Box    Equipment being ordered: Dispense face mask. Mrs. Spicer is immunosuppressed due to rheumatoid arthritis.    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 Use with Albuterol.    Hypoxia       order for DME     1 each    Equipment being ordered: Dispense baffle, for use with nebulizer.    Pneumonia of left  upper lobe due to Mycoplasma pneumoniae       * order for DME     1 each    Dispense SP Walker-small    Pain of toe of right foot, Status post bunionectomy       oxyCODONE IR 5 MG tablet    ROXICODONE     Take 2.5 mg by mouth        PRESERVISION AREDS PO      Take 1 tablet by mouth daily        propafenone 150 MG Tabs tablet    RYTHMOL    90 tablet    Take 1 tablet (150 mg) by mouth 3 times daily    Atrial flutter (H)       ranibizumab 0.3 MG/0.05ML Soln    LUCENTIS     0.3 mg by Intravitreal route        ranitidine 150 MG tablet    ZANTAC    60 tablet    Take 1 tablet (150 mg) by mouth 2 times daily    Gastroesophageal reflux disease without esophagitis       REFRESH P.M. Oint      Apply  to eye. Daily at bedtime        saccharomyces boulardii 250 MG capsule    FLORASTOR     Take 250 mg by mouth daily        senna-docusate 8.6-50 MG per tablet    SENOKOT-S;PERICOLACE    120 tablet    Take 2 tablets by mouth At Bedtime Hold if diarrhea occurs.    Constipation, chronic       triamcinolone 0.1 % cream    KENALOG    453.6 g    Apply sparingly to affected area on face three times daily.    Facial lesion       triamterene-hydrochlorothiazide 37.5-25 MG per capsule    DYAZIDE    30 capsule    Take 1 capsule by mouth daily    Hypertension goal BP (blood pressure) < 140/90       VITAMIN C PO           ZYRTEC ALLERGY 10 MG tablet   Generic drug:  cetirizine      Take 10 mg by mouth At Bedtime        * Notice:  This list has 3 medication(s) that are the same as other medications prescribed for you. Read the directions carefully, and ask your doctor or other care provider to review them with you.

## 2018-07-31 NOTE — LETTER
"    7/31/2018         RE: Linda Spicer  [address]  [facility] [address]        Dear Colleague,    Thank you for referring your patient, Linda Spicer, to the [facility]. Please see a copy of my visit note below.    History of Present Illness - Linda Spicer is a 87 year old female last seen on 10/11/2016, but she had seen Dr. Massey on 4/1/2015 for dizziness.  She saw me at that point due to continued ringing and pressure in the RIGHT ear.  However, she was seen a year prior due to feeling off balance all the time and every day.  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  She did not follow up with ENT because once her HTN medication was changed, it is totally fine.    The new issue is pressure and \"whistling\" in the RIGHT ear.  This started a few months ago, and it was most notable when she would wake up in the morning, only in the RIGHT ear.  There was no pain or draiange.  Fortunately, as of about a week its gone now.  She does mention that she had a bad viral URI and subsequent pneumonia in April, and it did start around then.  All I could find was some mild eustachian tube dysfunction, but no change in sensorineural hearing loss.  She was lost to follow up after that.    She is being seen today for a new reason, at the consult of Dr. Lockett. The reason for her return is that she had an accident and fell on July 2.  She tells me that she broke her S3 and also fell backward and hit her head on the mirror on the wall.  This was at a work out center.  She was eavlauted at the ER.  She had severe back pain. They scanned her head, and it was normal, and it was rescanned at [place], and it was normal.  She continues to have a \"numb fullness in the RIGHT side of the head around the ear.    Past Medical History -   Patient Active Problem List   Diagnosis     ACP (advance care planning)     Hypertension goal BP (blood " pressure) < 150/90     Hypothyroidism     Macular degeneration, left eye     Irritable bowel syndrome     Encounter for palliative care     Adjustment disorder with anxious mood     Mild anemia     DDD (degenerative disc disease), lumbar     CKD (chronic kidney disease) stage 3, GFR 30-59 ml/min     History of blood transfusion     Aftercare following surgery     S/P lumbar laminectomy     High risk medication use     Age-related osteoporosis without current pathological fracture     Atrophic vaginitis     Fecal incontinence     Female stress incontinence     Impingement syndrome of both shoulders     UIP (usual interstitial pneumonitis) (H)     High risk medications (not anticoagulants) long-term use     Heart failure with preserved ejection fraction (H)     Congestive heart failure with preserved LV function, NYHA class 3 (H)     Other specified hypothyroidism     Rheumatoid arthritis involving multiple sites with positive rheumatoid factor (H)     Health Care Home     Sick sinus syndrome (H)     Cardiac pacemaker - Medtronic dual lead pacemaker - Not Dependent - MRI Safe     Immunosuppression (H)     ILD (interstitial lung disease) (H)     Heart failure with preserved ejection fraction, NYHA class I (H)     Atrial flutter (H)       Current Medications -   Current Outpatient Prescriptions:      albuterol (2.5 MG/3ML) 0.083% neb solution, Take 1 vial by nebulization every 6 hours as needed for shortness of breath / dyspnea or wheezing, Disp: , Rfl:      apixaban ANTICOAGULANT (ELIQUIS) 2.5 MG tablet, Take 1 tablet (2.5 mg) by mouth 2 times daily, Disp: 180 tablet, Rfl: 3     Artificial Tear Ointment (REFRESH P.M.) OINT, Apply  to eye. Daily at bedtime  , Disp: , Rfl:      Ascorbic Acid (VITAMIN C PO), , Disp: , Rfl:      azaTHIOprine (IMURAN) 50 MG tablet, Take 1 tablet (50 mg) by mouth daily, Disp: 90 tablet, Rfl: 2     bismuth subsalicylate (PEPTO BISMOL) 262 MG chewable tablet, Take 2 tablets (524 mg) by mouth 4  times daily as needed, Disp: 100 tablet, Rfl: 11     Blood Pressure Monitor KIT, 1 kit daily as needed, Disp: 1 kit, Rfl: 0     calcium-vitamin D (CALTRATE) 600-400 MG-UNIT per tablet, Take 1 tablet by mouth 2 times daily , Disp: , Rfl:      cetirizine (ZYRTEC ALLERGY) 10 MG tablet, Take 10 mg by mouth At Bedtime, Disp: , Rfl:      COMPOUNDED NON-CONTROLLED SUBSTANCE (CMPD RX) - PHARMACY TO MIX COMPOUNDED MEDICATION, Estriol 1mg/gram. Place 1 gram vaginally daily for two weeks, then vaginally twice weekly after., Disp: 30 g, Rfl: 6     Cranberry 180 MG CAPS, Take 1 capsule by mouth daily Unsure of strength, Disp: , Rfl:      fish oil-omega-3 fatty acids (FISH OIL) 1000 MG capsule, Take 2 g by mouth daily. 2 capsules daily   , Disp: , Rfl:      folic acid (FOLVITE) 1 MG tablet, Take 1 tablet (1 mg) by mouth daily, Disp: 90 tablet, Rfl: 3     hydrocortisone 2.5 % cream, Apply BID to affected region(s) for 7-10 days., Disp: 30 g, Rfl: 0     Hypromellose (GENTEAL MILD OP), Apply  to eye daily., Disp: , Rfl:      levothyroxine (SYNTHROID/LEVOTHROID) 75 MCG tablet, TAKE ONE TABLET BY MOUTH EVERY DAY, Disp: 90 tablet, Rfl: 2     loratadine (CLARITIN) 10 MG tablet, Take 10 mg by mouth every morning, Disp: , Rfl:      LORazepam (ATIVAN) 1 MG tablet, Take 1 tablet (1 mg) by mouth 2 times daily, Disp: 30 tablet, Rfl: 0     losartan (COZAAR) 50 MG tablet, Take 1 tablet (50 mg) by mouth daily, Disp: 90 tablet, Rfl: 1     meclizine (ANTIVERT) 25 MG tablet, Take 25 mg by mouth as needed, Disp: , Rfl:      methocarbamol (ROBAXIN) 500 MG tablet, Take 250 mg by mouth, Disp: , Rfl:      metoprolol succinate (TOPROL-XL) 25 MG 24 hr tablet, Take 1 tablet (25 mg) by mouth daily, Disp: 90 tablet, Rfl: 3     MetroNIDAZOLE (FLAGYL PO), Take 500 mg by mouth 2 times daily , Disp: , Rfl:      Multiple Vitamins-Minerals (PRESERVISION AREDS PO), Take 1 tablet by mouth daily , Disp: , Rfl:      nitroglycerin (NITROSTAT) 0.4 MG SL tablet, Place 1  tablet (0.4 mg) under the tongue every 5 minutes as needed for chest pain, Disp: 25 tablet, Rfl: 0     order for DME, Dispense SP Walker-small, Disp: 1 each, Rfl: 0     order for DME, Equipment being ordered: Dispense baffle, for use with nebulizer., Disp: 1 each, Rfl: 0     order for DME, Equipment being ordered: Nebulizer. Use with Albuterol., Disp: 1 each, Rfl: 0     order for DME, Equipment being ordered: Dispense face mask. Mrs. Spicer is immunosuppressed due to rheumatoid arthritis., Disp: 1 Box, Rfl: 11     oxyCODONE IR (ROXICODONE) 5 MG tablet, Take 2.5 mg by mouth, Disp: , Rfl:      propafenone (RYTHMOL) 150 MG TABS tablet, Take 1 tablet (150 mg) by mouth 3 times daily, Disp: 90 tablet, Rfl: 3     ranibizumab (LUCENTIS) 0.3 MG/0.05ML SOLN, 0.3 mg by Intravitreal route, Disp: , Rfl:      ranitidine (ZANTAC) 150 MG tablet, Take 1 tablet (150 mg) by mouth 2 times daily, Disp: 60 tablet, Rfl: 5     saccharomyces boulardii (FLORASTOR) 250 MG capsule, Take 250 mg by mouth daily, Disp: , Rfl:      senna-docusate (SENOKOT-S;PERICOLACE) 8.6-50 MG per tablet, Take 2 tablets by mouth At Bedtime Hold if diarrhea occurs., Disp: 120 tablet, Rfl: 11     triamcinolone (KENALOG) 0.1 % cream, Apply sparingly to affected area on face three times daily., Disp: 453.6 g, Rfl: 5     triamterene-hydrochlorothiazide (DYAZIDE) 37.5-25 MG per capsule, Take 1 capsule by mouth daily, Disp: 30 capsule, Rfl: 5  No current facility-administered medications for this visit.     Facility-Administered Medications Ordered in Other Visits:      DOBUTamine 500 mg in dextrose 5% 250 mL (adult std), 15 mcg/kg/min, Intravenous, Continuous, Parag Park MD     DOBUTamine 500 mg in dextrose 5% 250 mL (adult std), 2.5-20 mcg/kg/min, Intravenous, Continuous, Parag Park MD    Allergies -   Allergies   Allergen Reactions     Cephalexin Hcl Diarrhea     Gabapentin Other (See Comments)     Dizzsiness     Naproxen GI Disturbance     Perfume       Lactase Other (See Comments)     Macrobid [Nitrofurantoin Anhydrous]      Possibly related to lung disease      Seasonal Allergies      Sulfa Drugs      Throat swelling     Xanax [Alprazolam] Other (See Comments)     Dizziness      Ciprofloxacin Itching and Rash       Social History -   Social History     Social History     Marital status:      Spouse name: N/A     Number of children: N/A     Years of education: N/A     Social History Main Topics     Smoking status: Never Smoker     Smokeless tobacco: Never Used     Alcohol use Yes      Comment: rare wine      Drug use: No     Sexual activity: No     Other Topics Concern     Not on file     Social History Narrative    . Has six children. She enjoys [employer]. Her  has cancer. Her son has financial and alcohol issues.       Family History -   Family History   Problem Relation Age of Onset     Hypertension Mother      Psychotic Disorder Father      Diabetes Son      Diabetes Daughter      Blood Disease Daughter        Review of Systems - As per HPI and PMHx, otherwise 10+ system review of the head and neck, and general constitution is negative.    Physical Exam  LMP  (LMP Unknown)    General - The patient is well nourished and well developed, and appears to have good nutritional status.  Alert and oriented to person and place, answers questions and cooperates with examination appropriately.   Head and Face - Normocephalic and atraumatic, with no gross asymmetry noted of the contour of the facial features.  The facial nerve is intact, with strong symmetric movements.  Voice and Breathing - The patient was breathing comfortably without the use of accessory muscles. There was no wheezing, stridor, or stertor.  The patients voice was clear and strong, and had appropriate pitch and quality.  Ears - The tympanic membranes are normal in appearance, bony landmarks are intact.  No retraction, perforation, or masses.  No fluid or purulence  was seen in the external canal or the middle ear. No evidence of infection of the middle ear or external canal, cerumen was normal in appearance.  Eyes - Extraocular movements intact, and the pupils were reactive to light.  Sclera were not icteric or injected, conjunctiva were pink and moist.  Mouth - Examination of the oral cavity showed pink, healthy oral mucosa. No lesions or ulcerations noted.  The tongue was mobile and midline, and the dentition were in good condition.    Throat - The walls of the oropharynx were smooth, pink, moist, symmetric, and had no lesions or ulcerations.  The tonsillar pillars and soft palate were symmetric.  The uvula was midline on elevation.    Neck - Normal midline excursion of the laryngotracheal complex during swallowing.  Full range of motion on passive movement.  Palpation of the occipital, submental, submandibular, internal jugular chain, and supraclavicular nodes did not demonstrate any abnormal lymph nodes or masses.  The carotid pulse was palpable bilaterally.  Palpation of the thyroid was soft and smooth, with no nodules or goiter appreciated.  The trachea was mobile and midline.  Nose - External contour is symmetric, no gross deflection or scars.  Nasal mucosa is pink and moist with no abnormal mucus.  The septum was midline and non-obstructive, turbinates of normal size and position.  No polyps, masses, or purulence noted on examination.    Audiologic Studies - An audiogram and tympanogram were performed today as part of the evaluation and personally reviewed. The tympanogram shows a normal Type A curve, with normal canal volume and middle ear pressure.  There is no sign of eustachian tube dysfunction or middle ear effusion.  The audiogram shows a gentle down sloping sensorineural hearing loss that is symmetric and unchanged from her audiogram in 2016.      A/P - Linda Spicer is a 87 year old female  (H92.01) Right ear pain  (primary encounter diagnosis)  (H90.3)  Sensorineural hearing loss (SNHL) of both ears  (G50.1) Atypical facial pain    Based on today's history and physical exam, I can find no evidence of middle ear pathology or eustachian tube dysfunction.  At this point my primary diagnosis is of temporomandibular syndrome.  I have discussed the etiology of TMJ, and the reasons why referred pain can mimic symptoms of ear disease, headaches, and even sinusitis.  i have given the patient an instructional sheet of things to be tried at home, as well a referral to a TMJ specialist should it be needed.  Finally, I counseled the patient that should the therapy not help, or should the symptoms change, that they should return to me.      Again, thank you for allowing me to participate in the care of your patient.        Sincerely,        Fredrick Myers MD

## 2018-07-31 NOTE — TELEPHONE ENCOUNTER
Health Call Center    Phone Message    May a detailed message be left on voicemail: yes    Reason for Call: Other: Patient states that she has a UTI. Wondering if an order can be placed for a urine sample. Please advise.      Action Taken: Message routed to:  Adult Clinics: Urology p [id number]

## 2018-07-31 NOTE — TELEPHONE ENCOUNTER
Spoke with patient regarding note below. Patient reports burning with urination, discomfort, and difficulty starting urine stream which started yesterday. Patient reports that she will be at the [facility] for an appointment today at 10am and would like to leave a urine sample at that time. Lab only appointment scheduled for today at 11:15am. Patient aware that we will watch for results and that she will be contacted with recommendations. Patient allergies verified. If antibiotics are needed, patient would like it sent to [facility] in [place].     Sena Chapman RN, BSN

## 2018-07-31 NOTE — PATIENT INSTRUCTIONS
Scheduling Information  To schedule your CT/MRI scan, please contact [facility] at [phone] OR [facility] at [phone]    To schedule your Surgery, please contact our Specialty Schedulers at [phone]      ENT Clinic Locations Clinic Hours Telephone Number     [facility]  [address] [place] [address]   Monday:           1:00pm -- 5:00pm    Friday:              8:00am - 12:00pm   To schedule/reschedule an appointment with   Dr. Myers,   please contact our   Specialty Scheduling Department at:     [phone]       [facility]  [address] [place] [address] Tuesday:          8:00am -- 2:00pm         Urgent Care Locations Clinic Hours Telephone Numbers     [facility]  [address] [place] [address]     Monday-Friday:     11:00am - 9:00pm    Saturday-Sunday:  9:00am - 5:00pm   [phone]     [facility]  [address] [place] [address]     Monday-Friday:      5:00pm - 9:00pm     Saturday-Sunday:  9:00am - 5:00pm   [phone]

## 2018-08-01 ENCOUNTER — PATIENT OUTREACH (OUTPATIENT)
Dept: CARE COORDINATION | Facility: CLINIC | Age: 83
End: 2018-08-01

## 2018-08-01 NOTE — PROGRESS NOTES
Clinic Care Coordination Contact  [facility]/Voicemail    Referral Source: Self-patient/Caregiver  Clinical Data: Care Coordinator Outreach  Outreach attempted x 1.  Left message on voicemail with call back information and requested return call.  Plan: Care Coordinator mailed out care coordination introduction letter on 4/6/16. Care Coordinator will try to reach patient again in 5-10 business days.    Melissa Behl BSN, RN, N  [facility] Care Coordinator  [phone]

## 2018-08-02 ENCOUNTER — MEDICAL CORRESPONDENCE (OUTPATIENT)
Dept: HEALTH INFORMATION MANAGEMENT | Facility: CLINIC | Age: 83
End: 2018-08-02

## 2018-08-02 NOTE — PROGRESS NOTES
"Clinic Care Coordination Contact    Clinic Care Coordination Contact  OUTREACH    Referral Information:  Referral Source: Self-patient/Caregiver    Primary Diagnosis: CHF    Chief Complaint   Patient presents with     Clinic Care Coordination - Follow-up     RN        Universal Utilization: Patient is followed by pulmonology, rheumatology, cardiology, endocrinology, urology  Patient utilizes the [facility]  Clinic Utilization  Difficulty keeping appointments:: No  Utilization    Last refreshed: 8/2/2018 10:38 AM:  No Show Count (past year) 1       Last refreshed: 8/2/2018 10:38 AM:  ED visits 0       Last refreshed: 8/2/2018 10:38 AM:  Hospital admissions 0          Current as of: 8/2/2018 10:38 AM             Clinical Concerns:  Current Medical Concerns:  Patient was in [facility] ED 7/26/18 due to dizziness.  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  Patient states she had take cyclobenzaprine yesterday and states she was very fatigued all day.  Patient reports fatigue since her fall 7/2/18.  Patient states she naps during the day and then has a difficult time sleeping at night, \"it's like my nights and days are mixed up.\"  RNCC counseled patient on sleep hygeine in order to correct her sleep disturbance.  RNCC advised patient to contact her PCP for new, worsening or persistent fatigue/sleep issues.  Patient continues to be followed by home care and stated she walked around the pond outside her home yesterday for the first time since the fall.  Patient attributes her increased fatigue today to her walk yesterday.  Patient's home care nurse will be out tomorrow and patient will also discuss symptoms with her.  Patient reports blood pressure has been well controlled 110's-120's/60's, heart rate in the 60's, paced.     Current Behavioral Concerns: n/a    Education Provided to patient: RNCC reviewed sleep hygiene and advised patient " to contact PCP for new, worsening or persistent symptoms.  Pain  Chronic pain (GOAL):: No  Health Maintenance Reviewed: Up to date  Clinical Pathway: None    Medication Management:  Patient sets up her medications on a weekly basis with her spouse.  Patient's children call her with reminder calls to take her medications.     Functional Status:  Dependent ADLs:: Ambulation-no assistive device  Bed or wheelchair confined:: No  Mobility Status: Independent    Living Situation:  Current living arrangement:: I live in a private home with spouse  Type of residence:: Apartment    Diet/Exercise/Sleep:  Diet:: Low cholesterol, Low saturated fat, No added salt  Inadequate nutrition (GOAL):: No  Food Insecurity: No  Tube Feeding: No  Exercise:: Currently not exercising  Inadequate activity/exercise (GOAL):: No  Significant changes in sleep pattern (GOAL): No    Transportation:  Transportation concerns (GOAL):: No  Transportation means:: Family, [place] mobility     Psychosocial:  Worship or spiritual beliefs that impact treatment:: No  Mental health DX:: No  Mental health management concern (GOAL):: No  Informal Support system:: [place] based, Family, Friends, Neighbors, Spouse     Financial/Insurance:   Financial/Insurance concerns (GOAL):: No       Resources and Interventions:  Current Resources:   List of home care services:: Skilled Nursing, Home Health Aid, Physicial Therapy;   Community Resources: Home Care     Equipment Currently Used at Home: raised toilet, shower chair    Advance Care Plan/Directive  Advanced Care Plans/Directives on file:: In process  Advanced Care Plan/Directive Status: In Process          Goals:   Goals        General    I will complete my health care directive. (pt-stated)     Notes - Note created  3/29/2018  3:07 PM by Behl, Melissa K, RN    Supporting Steps:    Pt has attended  a health care  directive clas-  s, is awaiting  to see her law-  elizabeth to finalize  the document.  10/24/17 Pt  informed of  CPR  vs intubation  and information  mailed out to  patient.                   Patient/Caregiver understanding: Patient has good understanding of her current plan of care, but often needs guidance and reassurance from care coordination.     Outreach Frequency: monthly  Future Appointments              [facility]    In 1 week LAB ONC [facility]    In 1 week BAY 9 INFUSION [facility]    In 2 weeks Emeterio Loza MD [facility]    In 1 month DEVICE CHECK RN CARD [facility]    In 1 month [facility]    In 1 month BAY 8 INFUSION [facility]    In 4 months PFT LAB [facility]    In 4 months Dea Acosta MD [facility]    In 7 months Asia Steven PA-C [facility]          Plan:   1. RN Care Coordinator will await notification from home care staff informing RN Care Coordinator of patients discharge plans/needs. RN Care Coordinator will review chart and outreach to home care every 4 weeks and will remain available to patient, care team and home care as needed.     2. Patient will call PCP for new, worsening or persistent issue with sleep/fatigue.    Melissa Behl BSN, RN, N  [facility] Care Coordinator  [phone]

## 2018-08-07 DIAGNOSIS — E03.9 HYPOTHYROIDISM, UNSPECIFIED TYPE: ICD-10-CM

## 2018-08-07 NOTE — TELEPHONE ENCOUNTER
"Requested Prescriptions   Pending Prescriptions Disp Refills     levothyroxine (SYNTHROID/LEVOTHROID) 75 MCG tablet [Pharmacy Med Name: LEVOTHYROXIN 75MCG  TAB]  Last Written Prescription Date:  11/24/17  Last Fill Quantity: 90,  # refills: 2   Last Office Visit with FMGORDON, TATIANA or [facility] prescribing provider:  07/27/18   Future Office Visit:    Next 5 appointments (look out 90 days)     Aug 09, 2018  9:00 AM CDT   [facility] Visit with [facility]   [facility]    [address] [place] [id number]   [phone]            Aug 20, 2018 12:30 PM CDT   Return Visit with Emeterio Loza MD   [facility])    [address] [place] [id number]   [phone]            Sep 05, 2018 10:00 AM CDT   Return Visit with DEVICE CHECK RN CARD   [facility])    [address] [place] [id number]   [phone]            Sep 11, 2018 10:00 AM CDT   Return Visit with Mario Davenport MD   [facility] ([address] [address]   [phone]                90 tablet 1     Sig: TAKE ONE TABLET BY MOUTH ONCE DAILY    Thyroid Protocol Passed    8/7/2018  9:32 AM       Passed - Patient is 12 years or older       Passed - Recent (12 mo) or future (30 days) visit within the authorizing provider's specialty    Patient had office visit in the last 12 months or has a visit in the next 30 days with authorizing provider or within the authorizing provider's specialty.  See \"Patient Info\" tab in inbasket, or \"Choose Columns\" in Meds & Orders section of the refill encounter.           Passed - Normal TSH on file in past 12 months    Recent Labs   Lab Test  04/04/18   1510   TSH  0.53             Passed - No active pregnancy on record    If patient is pregnant or has had " a positive pregnancy test, please check TSH.         Passed - No positive pregnancy test in past 12 months    If patient is pregnant or has had a positive pregnancy test, please check TSH.

## 2018-08-08 RX ORDER — LEVOTHYROXINE SODIUM 75 UG/1
TABLET ORAL
Qty: 90 TABLET | Refills: 1 | Status: ON HOLD | OUTPATIENT
Start: 2018-08-08 | End: 2018-12-04

## 2018-08-08 NOTE — TELEPHONE ENCOUNTER
Prescription approved per [facility] Refill Protocol.    Ana Arzate RN

## 2018-08-09 ENCOUNTER — OFFICE VISIT (OUTPATIENT)
Dept: FAMILY MEDICINE | Facility: CLINIC | Age: 83
End: 2018-08-09
Payer: MEDICARE

## 2018-08-09 VITALS
OXYGEN SATURATION: 98 % | DIASTOLIC BLOOD PRESSURE: 65 MMHG | BODY MASS INDEX: 28.05 KG/M2 | SYSTOLIC BLOOD PRESSURE: 127 MMHG | TEMPERATURE: 98.2 F | WEIGHT: 164.3 LBS | HEART RATE: 63 BPM | RESPIRATION RATE: 18 BRPM | HEIGHT: 64 IN

## 2018-08-09 DIAGNOSIS — E78.5 HYPERLIPIDEMIA LDL GOAL <100: ICD-10-CM

## 2018-08-09 DIAGNOSIS — M53.3 SACROILIAC JOINT PAIN: ICD-10-CM

## 2018-08-09 DIAGNOSIS — I50.30 HEART FAILURE WITH PRESERVED EJECTION FRACTION, NYHA CLASS I (H): ICD-10-CM

## 2018-08-09 DIAGNOSIS — I10 HYPERTENSION GOAL BP (BLOOD PRESSURE) < 130/80: Primary | ICD-10-CM

## 2018-08-09 LAB
ANION GAP SERPL CALCULATED.3IONS-SCNC: 7 MMOL/L (ref 3–14)
BUN SERPL-MCNC: 46 MG/DL (ref 7–30)
CALCIUM SERPL-MCNC: 8.8 MG/DL (ref 8.5–10.1)
CHLORIDE SERPL-SCNC: 101 MMOL/L (ref 94–109)
CHOLEST SERPL-MCNC: 156 MG/DL
CO2 SERPL-SCNC: 26 MMOL/L (ref 20–32)
CREAT SERPL-MCNC: 1.38 MG/DL (ref 0.52–1.04)
GFR SERPL CREATININE-BSD FRML MDRD: 36 ML/MIN/1.7M2
GLUCOSE SERPL-MCNC: 102 MG/DL (ref 70–99)
HDLC SERPL-MCNC: 59 MG/DL
LDLC SERPL CALC-MCNC: 87 MG/DL
NONHDLC SERPL-MCNC: 97 MG/DL
NT-PROBNP SERPL-MCNC: 1159 PG/ML (ref 0–450)
POTASSIUM SERPL-SCNC: 5.5 MMOL/L (ref 3.4–5.3)
SODIUM SERPL-SCNC: 134 MMOL/L (ref 133–144)
TRIGL SERPL-MCNC: 51 MG/DL

## 2018-08-09 PROCEDURE — 80048 BASIC METABOLIC PNL TOTAL CA: CPT | Performed by: INTERNAL MEDICINE

## 2018-08-09 PROCEDURE — 99214 OFFICE O/P EST MOD 30 MIN: CPT | Performed by: INTERNAL MEDICINE

## 2018-08-09 PROCEDURE — 83880 ASSAY OF NATRIURETIC PEPTIDE: CPT | Performed by: INTERNAL MEDICINE

## 2018-08-09 PROCEDURE — 36415 COLL VENOUS BLD VENIPUNCTURE: CPT | Performed by: INTERNAL MEDICINE

## 2018-08-09 PROCEDURE — 80061 LIPID PANEL: CPT | Performed by: INTERNAL MEDICINE

## 2018-08-09 RX ORDER — HYDROCHLOROTHIAZIDE 25 MG/1
25 TABLET ORAL
Qty: 30 TABLET | Refills: 5 | Status: SHIPPED | OUTPATIENT
Start: 2018-08-09 | End: 2018-08-21

## 2018-08-09 RX ORDER — LOSARTAN POTASSIUM 50 MG/1
50 TABLET ORAL
Qty: 180 TABLET | Refills: 1 | Status: ON HOLD | OUTPATIENT
Start: 2018-08-09 | End: 2018-10-11

## 2018-08-09 ASSESSMENT — PAIN SCALES - GENERAL: PAINLEVEL: SEVERE PAIN (7)

## 2018-08-09 NOTE — PROGRESS NOTES
SUBJECTIVE:   Linda Spicer is a 87 year old female who presents to clinic today for the following health issues:     1) Hypertension Follow-up      Outpatient blood pressures monitoring: YES,reportedly high    Low Salt Diet: yes    Adverse effects: none despite increased dose of Losartan (50 mg BID) and starting Dyazide (in favor of Bumetanide).    Compliance: good    Secondary causes: none    Chronic kidney disease: yes    Hyperthyroidism: no    Anxiety: yes    Decongestants: no    Substance abuse: no    Diabetes: no    Ischemic heart disease: yes    Stroke: no    Hyperlipidemia: no       2) Joint or Musculoskeletal Pain  Duration of complaint: acute   Description:   Location: left lower back pain  Character: Sharp  Intensity: mild, moderate  Progression of Symptoms: same  Accompanying Signs & Symptoms: Other symptoms: none  History: Previous similar pain: YES    Precipitating factors: Trauma: No                                     Overuse: YES  Alleviating factors: Improved by: rest/inactivity  Therapies Tried and outcome: None  Problem list and histories reviewed & adjusted, as indicated.      Patient Active Problem List   Diagnosis     ACP (advance care planning)     Hypertension goal BP (blood pressure) < 150/90     Hypothyroidism     Macular degeneration, left eye     Irritable bowel syndrome     Encounter for palliative care     Adjustment disorder with anxious mood     Mild anemia     DDD (degenerative disc disease), lumbar     CKD (chronic kidney disease) stage 3, GFR 30-59 ml/min     History of blood transfusion     Aftercare following surgery     S/P lumbar laminectomy     High risk medication use     Age-related osteoporosis without current pathological fracture     Atrophic vaginitis     Fecal incontinence     Female stress incontinence     Impingement syndrome of both shoulders     UIP (usual interstitial pneumonitis) (H)     High risk medications (not anticoagulants) long-term use     Heart  failure with preserved ejection fraction (H)     Congestive heart failure with preserved LV function, NYHA class 3 (H)     Other specified hypothyroidism     Rheumatoid arthritis involving multiple sites with positive rheumatoid factor (H)     Health Care Home     Sick sinus syndrome (H)     Cardiac pacemaker - Medtronic dual lead pacemaker - Not Dependent - MRI Safe     Immunosuppression (H)     ILD (interstitial lung disease) (H)     Heart failure with preserved ejection fraction, NYHA class I (H)     Atrial flutter (H)     Past Surgical History:   Procedure Laterality Date     APPENDECTOMY       BIOPSY      hemorrhoidectomy     ENT SURGERY      tonsillectomy     GYN SURGERY      3 D & C's     HYSTERECTOMY, PAP NO LONGER INDICATED       LAMINECTOMY LUMBAR ONE LEVEL N/A 10/13/2015    Procedure: LAMINECTOMY LUMBAR ONE LEVEL;  Surgeon: Fransico Toussaint MD;  Location:  OR       Social History   Substance Use Topics     Smoking status: Never Smoker     Smokeless tobacco: Never Used     Alcohol use Yes      Comment: rare wine      Family History   Problem Relation Age of Onset     Hypertension Mother      Psychotic Disorder Father      Diabetes Son      Diabetes Daughter      Blood Disease Daughter          Allergies   Allergen Reactions     Cephalexin Hcl Diarrhea     Gabapentin Other (See Comments)     Dizzsiness     Naproxen GI Disturbance     Perfume      Lactase Other (See Comments)     Macrobid [Nitrofurantoin Anhydrous]      Possibly related to lung disease      Seasonal Allergies      Sulfa Drugs      Throat swelling     Xanax [Alprazolam] Other (See Comments)     Dizziness      Ciprofloxacin Itching and Rash     Recent Labs   Lab Test  08/09/18   0958  05/02/18   1050  04/04/18   1510  02/07/18   1008  01/22/18   1018   12/07/17   1157  11/25/17   1007   08/30/17   1214   06/03/16   0756   LDL  87   --    --    --    --    --    --    --    --   76   --   109*   HDL  59   --    --    --    --    --     "--    --    --   50   --   47*   TRIG  51   --    --    --    --    --    --    --    --   101   --   75   ALT   --   28   --   35   --    --    --   32   < >   --    < >  36   CR  1.38*  1.36*   --   1.43*  1.26*   < >  1.26*  1.60*   < >   --    < >  1.44*   GFRESTIMATED  36*  37*   --   35*  40*   < >  40*  31*   < >   --    < >  35*   GFRESTBLACK  44*  45*   --   42*  49*   < >  49*  37*   < >   --    < >  42*   POTASSIUM  5.5*   --    --    --   4.3   < >   --   5.0   < >   --    < >  5.1   TSH   --    --   0.53   --    --    --   0.89   --    --   0.77   < >   --     < > = values in this interval not displayed.      BP Readings from Last 3 Encounters:   08/09/18 127/65   07/27/18 138/64   07/18/18 158/70    Wt Readings from Last 3 Encounters:   08/09/18 164 lb 4.8 oz (74.5 kg)   07/27/18 164 lb 9.6 oz (74.7 kg)   07/18/18 165 lb (74.8 kg)            ROS:  CONSTITUTIONAL: NEGATIVE for fever, chills, change in weight  INTEGUMENTARY/SKIN: NEGATIVE for worrisome rashes, moles or lesions  EYES: NEGATIVE for vision changes or irritation  ENT/MOUTH: NEGATIVE for ear, mouth and throat problems  RESP: NEGATIVE for significant cough or SOB  CV: NEGATIVE for chest pain, palpitations or peripheral edema  GI: NEGATIVE for nausea, abdominal pain, heartburn, or change in bowel habits  : NEGATIVE for frequency, dysuria, or hematuria  MUSCULOSKELETAL: NEGATIVE for significant arthralgias or myalgia  NEURO: NEGATIVE for weakness, dizziness or paresthesias  ENDOCRINE: NEGATIVE for temperature intolerance, skin/hair changes  HEME: NEGATIVE for bleeding problems  PSYCHIATRIC: NEGATIVE for changes in mood or affect    OBJECTIVE:     /65 (BP Location: Right arm, Patient Position: Chair, Cuff Size: Adult Regular)  Pulse 63  Temp 98.2  F (36.8  C) (Oral)  Resp 18  Ht 5' 4\" (1.626 m)  Wt 164 lb 4.8 oz (74.5 kg)  LMP  (LMP Unknown)  SpO2 98%  Breastfeeding? No  BMI 28.2 kg/m2  Body mass index is 28.2 kg/(m^2).  GENERAL: " healthy, alert and no distress  EYES: Eyes grossly normal to inspection, PERRL and conjunctivae and sclerae normal  HENT: ear canals and TM's normal, nose and mouth without ulcers or lesions  NECK: no adenopathy, no asymmetry, masses, or scars and thyroid normal to palpation  RESP: lungs clear to auscultation - no rales, rhonchi or wheezes  CV: regular rate and rhythm, normal S1 S2, no S3 or S4, no murmur, click or rub, no peripheral edema and peripheral pulses strong  ABDOMEN: soft, nontender, no hepatosplenomegaly, no masses and bowel sounds normal  MS: no gross musculoskeletal defects noted, no edema  SKIN: no suspicious lesions or rashes  NEURO: Normal strength and tone, mentation intact and speech normal  PSYCH: mentation appears normal, affect normal/bright    Diagnostic Test Results:  Results for orders placed or performed in visit on 08/09/18   BASIC METABOLIC PANEL   Result Value Ref Range    Sodium 134 133 - 144 mmol/L    Potassium 5.5 (H) 3.4 - 5.3 mmol/L    Chloride 101 94 - 109 mmol/L    Carbon Dioxide 26 20 - 32 mmol/L    Anion Gap 7 3 - 14 mmol/L    Glucose 102 (H) 70 - 99 mg/dL    Urea Nitrogen 46 (H) 7 - 30 mg/dL    Creatinine 1.38 (H) 0.52 - 1.04 mg/dL    GFR Estimate 36 (L) >60 mL/min/1.7m2    GFR Estimate If Black 44 (L) >60 mL/min/1.7m2    Calcium 8.8 8.5 - 10.1 mg/dL   Lipid panel reflex to direct LDL Fasting   Result Value Ref Range    Cholesterol 156 <200 mg/dL    Triglycerides 51 <150 mg/dL    HDL Cholesterol 59 >49 mg/dL    LDL Cholesterol Calculated 87 <100 mg/dL    Non HDL Cholesterol 97 <130 mg/dL   BNP-N terminal pro   Result Value Ref Range    N-Terminal Pro Bnp 1159 (H) 0 - 450 pg/mL       ASSESSMENT/PLAN:     (I10) Hypertension goal BP (blood pressure) < 130/80  (primary encounter diagnosis)  Comment:   Plan: BASIC METABOLIC PANEL, losartan (COZAAR) 50 MG         tablet, hydrochlorothiazide (HYDRODIURIL) 25 MG        tablet            (M53.3) Sacroiliac joint pain  Comment: Probable  cause of lower back pain.  Plan: Cannot take ora  NSAIDs due to intake of novel anticoagulants. Consider topical NSAIDs, lidocaine patch or even sacroiliac joint injections.    (I50.30) Heart failure with preserved ejection fraction, NYHA class I (H)  Comment:   Plan: losartan (COZAAR) 50 MG tablet, BNP-N terminal         pro          (E78.5) Hyperlipidemia LDL goal <100  Comment:   Plan: Lipid panel reflex to direct LDL Fasting,         STATIN NOT PRESCRIBED, INTENTIONAL,          Follow-up visit if condition worsens.      Tre Lockett MD  [facility]

## 2018-08-09 NOTE — MR AVS SNAPSHOT
After Visit Summary   8/9/2018    Linda Spicer    MRN: [id number]           Patient Information     Date Of Birth          6/13/1931        Visit Information        Provider Department      8/9/2018 9:00 AM Tre Lockett MD [facility]        Today's Diagnoses     Hypertension goal BP (blood pressure) < 130/80    -  1    Heart failure with preserved ejection fraction, NYHA class I (H)        Hyperlipidemia LDL goal <100        Pain of left sacroiliac joint           Follow-ups after your visit        Your next 10 appointments already scheduled     Aug 15, 2018  9:00 AM CDT   LAB with LAB ONC [facility] ([facility])    [id number] [place] [id number] [phone]           Please do not eat 10-12 hours before your appointment if you are coming in fasting for labs on lipids, cholesterol, or glucose (sugar). This does not apply to pregnant women. Water, hot tea and black coffee (with nothing added) are okay. Do not drink other fluids, diet soda or chew gum.            Aug 15, 2018  9:30 AM CDT   Level 1 with BAY 9 INFUSION   [facility] ([facility])    [address] [place] [address]   [phone]            Aug 20, 2018 12:30 PM CDT   Return Visit with Emeterio Loza MD   [facility])    [id number] [place] [address]   [phone]            Sep 05, 2018 10:00 AM CDT   Return Visit with DEVICE CHECK RN CARD   [facility])    [address] [place] [address]   [phone]            Sep 11, 2018 10:00 AM CDT   Return Visit with Mario Davenport MD   [facility] ([facility])    [address] [place] [id number] [phone]            Sep 13, 2018  9:30 AM CDT   Level 1 with BAY 8  INFUSION   [facility])    [address] [facility] [id number] [phone]            Dec 11, 2018 10:00 AM CST   Office Visit with PFT LAB   [facility] ([facility])    [address] [facility] [id number] [phone]           Bring a current list of meds and any records pertaining to this visit. For Physicals, please bring immunization records and any forms needing to be filled out. Please arrive 10 minutes early to complete paperwork.            Dec 11, 2018 11:00 AM CST   Return Visit with Dea Acosta MD   [facility])    [address] [facility] [id number] [phone]            Mar 01, 2019 10:00 AM CST   Return Visit with Asia Steven PA-C   [facility])    [address] [facility] [id number] [phone]              Who to contact     If you have questions or need follow up information about today's clinic visit or your schedule please contact [facility] directly at [phone].  Normal or non-critical lab and imaging results will be communicated to you by MyChart, letter or phone within 4 business days after the clinic has received the results. If you do not hear from us within 7 days, please contact the clinic through MyChart or phone. If you have a critical or abnormal lab result, we will notify you by phone as soon as possible.  Submit refill requests through [employer] or call your pharmacy and they will forward the refill request to us. Please allow 3 business days for your refill to be completed.          Additional Information About Your Visit        [employer] Information     [employer] gives you secure access to your electronic health record. If you see a primary care provider, you can also send messages to your care team and make appointments.  "If you have questions, please call your primary care clinic.  If you do not have a primary care provider, please call [phone] and they will assist you.        Care EveryWhere ID     This is your Care EveryWhere ID. This could be used by other organizations to access your [place] medical records  [id number]        Your Vitals Were     Pulse Temperature Respirations Height Last Period Pulse Oximetry    63 98.2  F (36.8  C) (Oral) 18 5' 4\" (1.626 m) (LMP Unknown) 98%    Breastfeeding? BMI (Body Mass Index)                No 28.2 kg/m2           Blood Pressure from Last 3 Encounters:   08/09/18 127/65   07/27/18 138/64   07/18/18 158/70    Weight from Last 3 Encounters:   08/09/18 164 lb 4.8 oz (74.5 kg)   07/27/18 164 lb 9.6 oz (74.7 kg)   07/18/18 165 lb (74.8 kg)              We Performed the Following     BASIC METABOLIC PANEL     BNP-N terminal pro     Lipid panel reflex to direct LDL Fasting          Today's Medication Changes          These changes are accurate as of 8/9/18  9:52 AM.  If you have any questions, ask your nurse or doctor.               These medicines have changed or have updated prescriptions.        Dose/Directions    * losartan 50 MG tablet   Commonly known as:  COZAAR   This may have changed:  Another medication with the same name was added. Make sure you understand how and when to take each.   Used for:  Heart failure with preserved ejection fraction, NYHA class I (H)   Changed by:  Tre Lockett MD        TAKE 1 TABLET BY MOUTH ONCE DAILY   Quantity:  90 tablet   Refills:  0       * losartan 50 MG tablet   Commonly known as:  COZAAR   This may have changed:  You were already taking a medication with the same name, and this prescription was added. Make sure you understand how and when to take each.   Used for:  Heart failure with preserved ejection fraction, NYHA class I (H), Hypertension goal BP (blood pressure) < 130/80   Changed by:  Tre Lockett MD        Dose:  50 " mg   Take 1 tablet (50 mg) by mouth 2 times daily   Quantity:  180 tablet   Refills:  1       * Notice:  This list has 2 medication(s) that are the same as other medications prescribed for you. Read the directions carefully, and ask your doctor or other care provider to review them with you.         Where to get your medicines      These medications were sent to [facility] [address] [place] [address] - [address] [facility]  [address] [facility] [address]     Phone:  [phone]     losartan 50 MG tablet                Primary Care Provider Office Phone # Fax #    Tre Lockett [phone]       [address]  [facility] [address]        Goals        General    I will complete my health care directive. (pt-stated)     Notes - Note created  3/29/2018  3:07 PM by Behl, Melissa K, RN    Supporting Steps:    Pt has attended  a health care  directive clas-  s, is awaiting  to see her law-  elizabeth to finalize  the document.  10/24/17 Pt  informed of CPR  vs intubation  and information  mailed out to  patient.             Equal Access to Services     [facility]: Hadii rakesh schuster hadasho Sojordin, waaxda luqadaha, qaybta kaalmada adeegyafidencio, zahraa moss . So [facility] [phone].    ATENCIÓN: Si habla español, tiene a merchant disposición servicios gratuitos de asistencia lingüística. Alin al [phone].    We comply with applicable federal civil rights laws and [place] laws. We do not discriminate on the basis of race, color, national origin, age, disability, sex, sexual orientation, or gender identity.            Thank you!     Thank you for choosing [facility]  for your care. Our goal is always to provide you with excellent care. Hearing back from our patients is one way we can continue to improve our services. Please take a few minutes to complete the written survey that you may receive in the mail after your visit with us. Thank  you!             Your Updated Medication List - Protect others around you: Learn how to safely use, store and throw away your medicines at www.disposemymeds.org.          This list is accurate as of 8/9/18  9:52 AM.  Always use your most recent med list.                   Brand Name Dispense Instructions for use Diagnosis    ACETAMINOPHEN PO      Take 1,000 mg by mouth 2 times daily        albuterol (2.5 MG/3ML) 0.083% neb solution      Take 1 vial by nebulization every 6 hours as needed for shortness of breath / dyspnea or wheezing        apixaban ANTICOAGULANT 2.5 MG tablet    ELIQUIS    180 tablet    Take 1 tablet (2.5 mg) by mouth 2 times daily    Atrial flutter, paroxysmal (H)       azaTHIOprine 50 MG tablet    IMURAN    90 tablet    Take 1 tablet (50 mg) by mouth daily    Rheumatoid arthritis involving multiple sites with positive rheumatoid factor (H)       bismuth subsalicylate 262 MG chewable tablet    PEPTO BISMOL    100 tablet    Take 2 tablets (524 mg) by mouth 4 times daily as needed    Dyspepsia and disorder of function of stomach       Blood Pressure Monitor Kit     1 kit    1 kit daily as needed    CHF (congestive heart failure) (H)       calcium-vitamin D 600-400 MG-UNIT per tablet    CALTRATE     Take 1 tablet by mouth 2 times daily        COMPOUNDED NON-CONTROLLED SUBSTANCE - PHARMACY TO MIX COMPOUNDED MEDICATION    CMPD RX    30 g    Estriol 1mg/gram. Place 1 gram vaginally daily for two weeks, then vaginally twice weekly after.    Atrophic vaginitis       Cranberry 180 MG Caps      Take 1 capsule by mouth daily Unsure of strength        cyclobenzaprine 5 MG tablet    FLEXERIL    60 tablet    Take 1 tablet (5 mg) by mouth 3 times daily as needed for muscle spasms    Atypical facial pain, Right ear pain       fish oil-omega-3 fatty acids 1000 MG capsule      Take 2 g by mouth daily. 2 capsules daily        FLAGYL PO      Take 500 mg by mouth 2 times daily    Acute infectious diarrhea       folic  acid 1 MG tablet    FOLVITE    90 tablet    Take 1 tablet (1 mg) by mouth daily    Oral aphthae       GENTEAL MILD OP      Apply  to eye daily.        hydrocortisone 2.5 % cream     30 g    Apply BID to affected region(s) for 7-10 days.    Rash       levothyroxine 75 MCG tablet    SYNTHROID/LEVOTHROID    90 tablet    TAKE ONE TABLET BY MOUTH ONCE DAILY    Hypothyroidism, unspecified type       loratadine 10 MG tablet    CLARITIN     Take 10 mg by mouth every morning        LORazepam 1 MG tablet    ATIVAN    30 tablet    Take 1 tablet (1 mg) by mouth 2 times daily    Anxiety       * losartan 50 MG tablet    COZAAR    90 tablet    TAKE 1 TABLET BY MOUTH ONCE DAILY    Heart failure with preserved ejection fraction, NYHA class I (H)       * losartan 50 MG tablet    COZAAR    180 tablet    Take 1 tablet (50 mg) by mouth 2 times daily    Heart failure with preserved ejection fraction, NYHA class I (H), Hypertension goal BP (blood pressure) < 130/80       meclizine 25 MG tablet    ANTIVERT     Take 25 mg by mouth as needed        methocarbamol 500 MG tablet    ROBAXIN     Take 250 mg by mouth        metoprolol succinate 25 MG 24 hr tablet    TOPROL-XL    90 tablet    Take 1 tablet (25 mg) by mouth daily    HTN (hypertension)       nitroGLYcerin 0.4 MG sublingual tablet    NITROSTAT    25 tablet    Place 1 tablet (0.4 mg) under the tongue every 5 minutes as needed for chest pain    Chest pain       * order for DME     1 Box    Equipment being ordered: Dispense face mask. Mrs. Spicer is immunosuppressed due to rheumatoid arthritis.    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 Use with Albuterol.    Hypoxia       order for DME     1 each    Equipment being ordered: Dispense baffle, for use with nebulizer.    Pneumonia of left upper lobe due to Mycoplasma pneumoniae       * order for DME     1 each    Dispense SP  Walker-small    Pain of toe of right foot, Status post bunionectomy       oxyCODONE IR 5 MG tablet    ROXICODONE     Take 2.5 mg by mouth        PRESERVISION AREDS PO      Take 1 tablet by mouth daily        propafenone 150 MG Tabs tablet    RYTHMOL    90 tablet    Take 1 tablet (150 mg) by mouth 3 times daily    Atrial flutter (H)       ranibizumab 0.3 MG/0.05ML Soln    LUCENTIS     0.3 mg by Intravitreal route        ranitidine 150 MG tablet    ZANTAC    60 tablet    Take 1 tablet (150 mg) by mouth 2 times daily    Gastroesophageal reflux disease without esophagitis       REFRESH P.M. Oint      Apply  to eye. Daily at bedtime        saccharomyces boulardii 250 MG capsule    FLORASTOR     Take 250 mg by mouth daily        senna-docusate 8.6-50 MG per tablet    SENOKOT-S;PERICOLACE    120 tablet    Take 2 tablets by mouth At Bedtime Hold if diarrhea occurs.    Constipation, chronic       triamcinolone 0.1 % cream    KENALOG    453.6 g    Apply sparingly to affected area on face three times daily.    Facial lesion       triamterene-hydrochlorothiazide 37.5-25 MG per capsule    DYAZIDE    30 capsule    Take 1 capsule by mouth daily    Hypertension goal BP (blood pressure) < 140/90       VITAMIN C PO           ZYRTEC ALLERGY 10 MG tablet   Generic drug:  cetirizine      Take 10 mg by mouth At Bedtime        * Notice:  This list has 5 medication(s) that are the same as other medications prescribed for you. Read the directions carefully, and ask your doctor or other care provider to review them with you.

## 2018-08-10 ENCOUNTER — PATIENT OUTREACH (OUTPATIENT)
Dept: CARE COORDINATION | Facility: CLINIC | Age: 83
End: 2018-08-10

## 2018-08-10 NOTE — PROGRESS NOTES
Clinic Care Coordination Contact  Care Team Conversations    RNCC called writer due to being confused regarding her medications.  Patient was seen by PCP yesterday.  Patient picked up a prescription for hydrochlorothiazide from the pharmacy and states she did not recall this being discussed during her visit.  RNCC reviewed chart and informed patient of 8/9/18 result note:  Entered by Tre Lockett MD at 8/9/2018  1:18 PM   Read by Linda Spicer at 8/9/2018  8:04 PM   Linda, your potassium is above normal range. This is due to increased dose of Losartan and Dyazide. Continue Losartan but STOP Dyazide in favor of Hydrochlorothiazide. Take Hydrochlorothiazide right away as soon as you  this new Rx (and stop Dyazide).  Dr. Lockett         Patient had her medications with her at the time of call and made sure to discard triamterene/hctz and only take hydrochlorothiazide going forward.  Patient states she did not fully understand the [employer] message.    Patient remains in home care through [facility].  Patient is uncertain of when home care will discharge.    Plan:  Patient will begin taking hydrochlorothiazide in place of triamterene/hctz immediately.  RN Care Coordinator will await notification from home care staff informing RN Care Coordinator of patients discharge plans/needs. RN Care Coordinator will review chart and outreach to home care every 4 weeks and will remain available to patient, care team and home care as needed.       Melissa Behl BSN, RN, N  [facility] Care Coordinator  [phone]

## 2018-08-14 ENCOUNTER — TELEPHONE (OUTPATIENT)
Dept: FAMILY MEDICINE | Facility: CLINIC | Age: 83
End: 2018-08-14

## 2018-08-14 NOTE — TELEPHONE ENCOUNTER
Left message for Ayesha of the okay as noted below.    Sena Lloyd RN, [facility]

## 2018-08-14 NOTE — TELEPHONE ENCOUNTER
Reason for Call:  Other HOME CARE    Detailed comments: this is the last week of 6 weeks of physical therapy and asking for an additional time for orders for 2 x a week for 2 weeks    Phone Number Patient can be reached at: Other phone number:   HOLLY[facility]LARRY (HOME CARE) [phone]          Best Time: any    Can we leave a detailed message on this number? YES    Call taken on 8/14/2018 at 10:47 AM by Marilia Wright

## 2018-08-14 NOTE — TELEPHONE ENCOUNTER
Notify Ayesha  [facility] that I agree to additional physical therapy.

## 2018-08-15 ENCOUNTER — INFUSION THERAPY VISIT (OUTPATIENT)
Dept: INFUSION THERAPY | Facility: CLINIC | Age: 83
End: 2018-08-15
Payer: MEDICARE

## 2018-08-15 VITALS
BODY MASS INDEX: 28.34 KG/M2 | SYSTOLIC BLOOD PRESSURE: 143 MMHG | DIASTOLIC BLOOD PRESSURE: 81 MMHG | WEIGHT: 165.1 LBS | TEMPERATURE: 98.1 F | OXYGEN SATURATION: 99 % | HEART RATE: 60 BPM

## 2018-08-15 DIAGNOSIS — M05.79 RHEUMATOID ARTHRITIS INVOLVING MULTIPLE SITES WITH POSITIVE RHEUMATOID FACTOR (H): Primary | ICD-10-CM

## 2018-08-15 DIAGNOSIS — M05.79 RHEUMATOID ARTHRITIS INVOLVING MULTIPLE SITES WITH POSITIVE RHEUMATOID FACTOR (H): ICD-10-CM

## 2018-08-15 DIAGNOSIS — Z79.899 HIGH RISK MEDICATION USE: ICD-10-CM

## 2018-08-15 LAB
ALBUMIN SERPL-MCNC: 3.4 G/DL (ref 3.4–5)
ALP SERPL-CCNC: 111 U/L (ref 40–150)
ALT SERPL W P-5'-P-CCNC: 18 U/L (ref 0–50)
AST SERPL W P-5'-P-CCNC: 18 U/L (ref 0–45)
BASOPHILS # BLD AUTO: 0 10E9/L (ref 0–0.2)
BASOPHILS NFR BLD AUTO: 0.5 %
BILIRUB DIRECT SERPL-MCNC: 0.1 MG/DL (ref 0–0.2)
BILIRUB SERPL-MCNC: 0.5 MG/DL (ref 0.2–1.3)
CREAT SERPL-MCNC: 1.27 MG/DL (ref 0.52–1.04)
DIFFERENTIAL METHOD BLD: ABNORMAL
EOSINOPHIL # BLD AUTO: 0.1 10E9/L (ref 0–0.7)
EOSINOPHIL NFR BLD AUTO: 1.8 %
ERYTHROCYTE [DISTWIDTH] IN BLOOD BY AUTOMATED COUNT: 12.9 % (ref 10–15)
GFR SERPL CREATININE-BSD FRML MDRD: 40 ML/MIN/1.7M2
HCT VFR BLD AUTO: 31.4 % (ref 35–47)
HGB BLD-MCNC: 10.6 G/DL (ref 11.7–15.7)
IMM GRANULOCYTES # BLD: 0 10E9/L (ref 0–0.4)
IMM GRANULOCYTES NFR BLD: 0.7 %
LYMPHOCYTES # BLD AUTO: 1.4 10E9/L (ref 0.8–5.3)
LYMPHOCYTES NFR BLD AUTO: 25 %
MCH RBC QN AUTO: 33.2 PG (ref 26.5–33)
MCHC RBC AUTO-ENTMCNC: 33.8 G/DL (ref 31.5–36.5)
MCV RBC AUTO: 98 FL (ref 78–100)
MONOCYTES # BLD AUTO: 0.7 10E9/L (ref 0–1.3)
MONOCYTES NFR BLD AUTO: 11.4 %
NEUTROPHILS # BLD AUTO: 3.4 10E9/L (ref 1.6–8.3)
NEUTROPHILS NFR BLD AUTO: 60.6 %
PLATELET # BLD AUTO: 254 10E9/L (ref 150–450)
PROT SERPL-MCNC: 7.4 G/DL (ref 6.8–8.8)
RBC # BLD AUTO: 3.19 10E12/L (ref 3.8–5.2)
WBC # BLD AUTO: 5.7 10E9/L (ref 4–11)

## 2018-08-15 PROCEDURE — 36415 COLL VENOUS BLD VENIPUNCTURE: CPT | Performed by: INTERNAL MEDICINE

## 2018-08-15 PROCEDURE — 96365 THER/PROPH/DIAG IV INF INIT: CPT | Performed by: INTERNAL MEDICINE

## 2018-08-15 PROCEDURE — 82565 ASSAY OF CREATININE: CPT | Performed by: INTERNAL MEDICINE

## 2018-08-15 PROCEDURE — 80076 HEPATIC FUNCTION PANEL: CPT | Performed by: INTERNAL MEDICINE

## 2018-08-15 PROCEDURE — 99207 ZZC NO CHARGE LOS: CPT

## 2018-08-15 PROCEDURE — 85025 COMPLETE CBC W/AUTO DIFF WBC: CPT | Performed by: INTERNAL MEDICINE

## 2018-08-15 RX ADMIN — Medication 250 ML: at 10:02

## 2018-08-15 ASSESSMENT — PAIN SCALES - GENERAL: PAINLEVEL: MODERATE PAIN (4)

## 2018-08-15 NOTE — MR AVS SNAPSHOT
After Visit Summary   8/15/2018    Linda Spicer    MRN: [id number]           Patient Information     Date Of Birth          6/13/1931        Visit Information        Provider Department      8/15/2018 9:30 AM BAY 2 INFUSION [facility]        Today's Diagnoses     Rheumatoid arthritis involving multiple sites with positive rheumatoid factor (H)    -  1       Follow-ups after your visit        Your next 10 appointments already scheduled     Aug 20, 2018 12:30 PM CDT   Return Visit with Emeterio Loza MD   [facility])    [id number] [place] [id number]   [phone]            Sep 05, 2018 10:00 AM CDT   Return Visit with DEVICE CHECK RN CARD   [facility] ([facility])    [address] [place] [address]   [phone]            Sep 11, 2018 10:00 AM CDT   [facility] Visit with Mario Davenport MD   [facility]    [address] [place] [id number]   [phone]            Sep 13, 2018  9:30 AM CDT   Level 1 with BAY 8 INFUSION   [facility] ([facility])    [address] [place] [address]   [phone]            Oct 11, 2018  9:00 AM CDT   Level 1 with BAY 7 INFUSION   [facility] ([facility])    [address] [place] [address]   [phone]            Dec 11, 2018 10:00 AM CST   Office Visit with PFT LAB   [facility])    [id number] [place] [address]   [phone]           Bring a current list of meds and any records pertaining to this visit. For Physicals, please bring immunization records and any forms needing to be filled out. Please arrive 10 minutes early to complete paperwork.            Dec 11, 2018 11:00 AM CST   Return Visit  with Dea Acosta MD   [facility] ([facility])    [address] [place] [address] [phone]            Mar 01, 2019 10:00 AM CST   Return Visit with Asia Steven PA-C   [facility] ([facility])    [address] [place] [address] [phone]              Who to contact     If you have questions or need follow up information about today's clinic visit or your schedule please contact [facility] directly at [phone].  Normal or non-critical lab and imaging results will be communicated to you by [employer], letter or phone within 4 business days after the clinic has received the results. If you do not hear from us within 7 days, please contact the clinic through [employer] or phone. If you have a critical or abnormal lab result, we will notify you by phone as soon as possible.  Submit refill requests through [employer] or call your pharmacy and they will forward the refill request to us. Please allow 3 business days for your refill to be completed.          Additional Information About Your Visit        [employer] Information     [employer] gives you secure access to your electronic health record. If you see a primary care provider, you can also send messages to your care team and make appointments. If you have questions, please call your primary care clinic.  If you do not have a primary care provider, please call [phone] and they will assist you.      [employer] is an electronic gateway that provides easy, online access to your medical records. With [employer], you can request a clinic appointment, read your test results, renew a prescription or communicate with your care team.     To access your existing account, please contact your [facility] or call [phone] for assistance.        Care EveryWhere ID     This is your Care EveryWhere ID. This could be used  by other organizations to access your [place] medical records  [id number]        Your Vitals Were     Pulse Temperature Last Period Pulse Oximetry BMI (Body Mass Index)       60 98.1  F (36.7  C) (Oral) (LMP Unknown) 99% 28.34 kg/m2        Blood Pressure from Last 3 Encounters:   08/15/18 143/81   08/09/18 127/65   07/27/18 138/64    Weight from Last 3 Encounters:   08/15/18 74.9 kg (165 lb 1.6 oz)   08/09/18 74.5 kg (164 lb 4.8 oz)   07/27/18 74.7 kg (164 lb 9.6 oz)              Today, you had the following     No orders found for display       Primary Care Provider Office Phone # Fax #    Tre Lockett [phone]       [address]  [facility] [address]        Goals        General    I will complete my health care directive. (pt-stated)     Notes - Note created  3/29/2018  3:07 PM by Behl, Melissa K, RN    Supporting Steps:    Pt has attended  a health care  directive clas-  s, is awaiting  to see her law-  elizabeth to finalize  the document.  10/24/17 Pt  informed of CPR  vs intubation  and information  mailed out to  patient.             Equal Access to Services     [facility] : Hadii aad ku hadasho Sojasonali, waaxda luqadaha, qaybta kaalmada adeegyada, zahraa skinner. So [facility] [phone].    ATENCIÓN: Si habla español, tiene a merchant disposición servicios gratuitos de asistencia lingüística. Llame al [phone].    We comply with applicable federal civil rights laws and [place] laws. We do not discriminate on the basis of race, color, national origin, age, disability, sex, sexual orientation, or gender identity.            Thank you!     Thank you for choosing [facility]  for your care. Our goal is always to provide you with excellent care. Hearing back from our patients is one way we can continue to improve our services. Please take a few minutes to complete the written survey that you may receive in the mail after your visit with us.  Thank you!             Your Updated Medication List - Protect others around you: Learn how to safely use, store and throw away your medicines at www.disposemymeds.org.          This list is accurate as of 8/15/18 10:50 AM.  Always use your most recent med list.                   Brand Name Dispense Instructions for use Diagnosis    ACETAMINOPHEN PO      Take 1,000 mg by mouth 2 times daily        albuterol (2.5 MG/3ML) 0.083% neb solution      Take 1 vial by nebulization every 6 hours as needed for shortness of breath / dyspnea or wheezing        apixaban ANTICOAGULANT 2.5 MG tablet    ELIQUIS    180 tablet    Take 1 tablet (2.5 mg) by mouth 2 times daily    Atrial flutter, paroxysmal (H)       azaTHIOprine 50 MG tablet    IMURAN    90 tablet    Take 1 tablet (50 mg) by mouth daily    Rheumatoid arthritis involving multiple sites with positive rheumatoid factor (H)       bismuth subsalicylate 262 MG chewable tablet    PEPTO BISMOL    100 tablet    Take 2 tablets (524 mg) by mouth 4 times daily as needed    Dyspepsia and disorder of function of stomach       Blood Pressure Monitor Kit     1 kit    1 kit daily as needed    CHF (congestive heart failure) (H)       calcium-vitamin D 600-400 MG-UNIT per tablet    CALTRATE     Take 1 tablet by mouth 2 times daily        COMPOUNDED NON-CONTROLLED SUBSTANCE - PHARMACY TO MIX COMPOUNDED MEDICATION    CMPD RX    30 g    Estriol 1mg/gram. Place 1 gram vaginally daily for two weeks, then vaginally twice weekly after.    Atrophic vaginitis       Cranberry 180 MG Caps      Take 1 capsule by mouth daily Unsure of strength        cyclobenzaprine 5 MG tablet    FLEXERIL    60 tablet    Take 1 tablet (5 mg) by mouth 3 times daily as needed for muscle spasms    Atypical facial pain, Right ear pain       fish oil-omega-3 fatty acids 1000 MG capsule      Take 2 g by mouth daily. 2 capsules daily        folic acid 1 MG tablet    FOLVITE    90 tablet    Take 1 tablet (1 mg) by mouth daily     Oral aphthae       GENTEAL MILD OP      Apply  to eye daily.        hydrochlorothiazide 25 MG tablet    HYDRODIURIL    30 tablet    Take 1 tablet (25 mg) by mouth daily (with breakfast)    Hypertension goal BP (blood pressure) < 130/80       hydrocortisone 2.5 % cream     30 g    Apply BID to affected region(s) for 7-10 days.    Rash       levothyroxine 75 MCG tablet    SYNTHROID/LEVOTHROID    90 tablet    TAKE ONE TABLET BY MOUTH ONCE DAILY    Hypothyroidism, unspecified type       loratadine 10 MG tablet    CLARITIN     Take 10 mg by mouth every morning        LORazepam 1 MG tablet    ATIVAN    30 tablet    Take 1 tablet (1 mg) by mouth 2 times daily    Anxiety       losartan 50 MG tablet    COZAAR    180 tablet    Take 1 tablet (50 mg) by mouth 2 times daily    Heart failure with preserved ejection fraction, NYHA class I (H), Hypertension goal BP (blood pressure) < 130/80       meclizine 25 MG tablet    ANTIVERT     Take 25 mg by mouth as needed        methocarbamol 500 MG tablet    ROBAXIN     Take 250 mg by mouth        metoprolol succinate 25 MG 24 hr tablet    TOPROL-XL    90 tablet    Take 1 tablet (25 mg) by mouth daily    HTN (hypertension)       nitroGLYcerin 0.4 MG sublingual tablet    NITROSTAT    25 tablet    Place 1 tablet (0.4 mg) under the tongue every 5 minutes as needed for chest pain    Chest pain       * order for DME     1 Box    Equipment being ordered: Dispense face mask. Mrs. Spicer is immunosuppressed due to rheumatoid arthritis.    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 Use with Albuterol.    Hypoxia       order for DME     1 each    Equipment being ordered: Dispense baffle, for use with nebulizer.    Pneumonia of left upper lobe due to Mycoplasma pneumoniae       * order for DME     1 each    Dispense SP Walker-small    Pain of toe of right foot, Status post bunionectomy        oxyCODONE IR 5 MG tablet    ROXICODONE     Take 2.5 mg by mouth        PRESERVISION AREDS PO      Take 1 tablet by mouth daily        propafenone 150 MG Tabs tablet    RYTHMOL    90 tablet    Take 1 tablet (150 mg) by mouth 3 times daily    Atrial flutter (H)       ranibizumab 0.3 MG/0.05ML Soln    LUCENTIS     0.3 mg by Intravitreal route        ranitidine 150 MG tablet    ZANTAC    60 tablet    Take 1 tablet (150 mg) by mouth 2 times daily    Gastroesophageal reflux disease without esophagitis       REFRESH P.M. Oint      Apply  to eye. Daily at bedtime        saccharomyces boulardii 250 MG capsule    FLORASTOR     Take 250 mg by mouth daily        senna-docusate 8.6-50 MG per tablet    SENOKOT-S;PERICOLACE    120 tablet    Take 2 tablets by mouth At Bedtime Hold if diarrhea occurs.    Constipation, chronic       STATIN NOT PRESCRIBED (INTENTIONAL)      Please choose reason not prescribed, below    Hyperlipidemia LDL goal <100       triamcinolone 0.1 % cream    KENALOG    453.6 g    Apply sparingly to affected area on face three times daily.    Facial lesion       VITAMIN C PO           ZYRTEC ALLERGY 10 MG tablet   Generic drug:  cetirizine      Take 10 mg by mouth At Bedtime        * Notice:  This list has 3 medication(s) that are the same as other medications prescribed for you. Read the directions carefully, and ask your doctor or other care provider to review them with you.

## 2018-08-20 ENCOUNTER — OFFICE VISIT (OUTPATIENT)
Dept: CARDIOLOGY | Facility: CLINIC | Age: 83
End: 2018-08-20
Payer: MEDICARE

## 2018-08-20 VITALS
WEIGHT: 165.8 LBS | OXYGEN SATURATION: 97 % | HEART RATE: 60 BPM | DIASTOLIC BLOOD PRESSURE: 78 MMHG | BODY MASS INDEX: 28.46 KG/M2 | SYSTOLIC BLOOD PRESSURE: 157 MMHG

## 2018-08-20 DIAGNOSIS — R00.2 PALPITATIONS: ICD-10-CM

## 2018-08-20 PROCEDURE — 99213 OFFICE O/P EST LOW 20 MIN: CPT | Performed by: INTERNAL MEDICINE

## 2018-08-20 ASSESSMENT — PAIN SCALES - GENERAL: PAINLEVEL: NO PAIN (0)

## 2018-08-20 NOTE — MR AVS SNAPSHOT
After Visit Summary   8/20/2018    Linda Spicer    MRN: [id number]           Patient Information     Date Of Birth          6/13/1931        Visit Information        Provider Department      8/20/2018 12:30 PM Emeterio Loza MD [facility]        Today's Diagnoses     Palpitations          Care Instructions      The following is a summary of your office visit:    Medications started today:    Medications stopped today:    Medication dose change:     Nurse contact information: Stacey Perez RN  Cardiology Care Coordinator  [phone] Phone  [phone] Fax    Appointments made today:     Patient instructions:    If you have had any blood work, imaging or other testing completed we will be in touch within 1-2 weeks regarding the results. If you have any questions, concerns or need to schedule a follow up, please contact us at [phone]. If you are needing refills please contact your pharmacy. For urgent after hour care please call the [place] [facility] at [phone] or the [facility] at [phone] and ask to speak to the cardiologist on call.    It was a pleasure meeting with you today. Please let us know if there is anything else we can do for you so that we can be sure you are leaving completely satisfied with your care experience.     Your Cardiology Team at [facility]  RN Care Coordinator: Stacey  Please call [phone] and ask for Cardiology with any new symptoms, questions, or concerns.     For urgent after hour care, please call the [place] [facility] at [phone], or the [facility] at [phone], and ask to speak to the cardiologist on call.    When to Call Your Doctor  Call your doctor if you have any of the following:  Changed or worsened chest pain.  Chest pain that started within the past 2 months and is now more severe.   Chest pain that happens 3 or more times per  day.   Chest pain that suddenly becomes more frequent or severe, lasts longer, or is brought on by less exertion than before.   Chest pain that occurs at rest, with no obvious exertion or stress. It might wake you from sleep.  Call 911 or other emergency services if you have CAD that has been diagnosed by a doctor and you have chest pain that doesn't go away after using your home treatment plan for angina.  When in Doubt:  If you aren't sure if your symptoms are serious or decide not to call 911, call our [place] [facility] at [phone] or the [facility] at [phone] and ask to speak to the cardiologist on call. They can help you decide if your pain is an emergency or not.              Follow-ups after your visit        Your next 10 appointments already scheduled     Sep 05, 2018 10:00 AM CDT   [facility] with DEVICE [facility] CARD   [facility]    [address] [place] [address]   [phone]            Sep 11, 2018 10:00 AM CDT   [facility]   [facility]    [address] [facility] [address]   [phone]            Sep 13, 2018  9:30 AM CDT   Level 1 with BAY 8 INFUSION   [facility])    [address] [place] [address]   [phone]            Oct 11, 2018  9:00 AM CDT   Level 1 with BAY 7 INFUSION   [facility]    [address] [place] [address]   [phone]            Dec 11, 2018 10:00 AM CST   Office Visit with PFT LAB   [facility])    [address] [place] [address]   [phone]           Bring a current list of meds and any records pertaining to this visit. For Physicals, please bring immunization records and any forms  needing to be filled out. Please arrive 10 minutes early to complete paperwork.            Dec 11, 2018 11:00 AM CST   Return Visit with Dea Acosta MD   [email or web address] ([facility] [[email or web address]    [address] [place] [id number] [phone]            Feb 18, 2019  2:10 PM CST   Return Visit with Emeterio Loza MD   [email or web address] ([email or web address]    [address] [place] [id number] [phone]            Mar 01, 2019 10:00 AM CST   Return Visit with Asia Steven PA-C   [email or web address] ([facility] [[email or web address]    [address] [place] [id number] [phone]              Who to contact     If you have questions or need follow up information about today's clinic visit or your schedule please contact [facility] directly at [phone].  Normal or non-critical lab and imaging results will be communicated to you by [email or web address], letter or phone within 4 business days after the clinic has received the results. If you do not hear from us within 7 days, please contact the clinic through [email or web address] or phone. If you have a critical or abnormal lab result, we will notify you by phone as soon as possible.  Submit refill requests through SNAPCARD or call your pharmacy and they will forward the refill request to us. Please allow 3 business days for your refill to be completed.          Additional Information About Your Visit        [email or web address] Information     SNAPCARD gives you secure access to your electronic health record. If you see a primary care provider, you can also send messages to your care team and make appointments. If you have questions, please call your primary care clinic.  If you do not have a primary care provider, please call [phone] and they will assist you.      SNAPCARD is an electronic gateway that provides easy, online access to your medical records. With SNAPCARD,  you can request a clinic appointment, read your test results, renew a prescription or communicate with your care team.     To access your existing account, please contact your [facility] or call [phone] for assistance.        Care EveryWhere ID     This is your Care EveryWhere ID. This could be used by other organizations to access your [facility] medical records  [id number]        Your Vitals Were     Pulse Last Period Pulse Oximetry BMI (Body Mass Index)          60 (LMP Unknown) 97% 28.46 kg/m2         Blood Pressure from Last 3 Encounters:   08/20/18 157/78   08/15/18 143/81   08/09/18 127/65    Weight from Last 3 Encounters:   08/20/18 75.2 kg (165 lb 12.8 oz)   08/15/18 74.9 kg (165 lb 1.6 oz)   08/09/18 74.5 kg (164 lb 4.8 oz)              We Performed the Following     Follow-Up with Cardiologist        Primary Care Provider Office Phone # Fax #    Tre Lockett [phone]       [address] [facility] [address]        Goals        General    I will complete my health care directive. (pt-stated)     Notes - Note created  3/29/2018  3:07 PM by Behl, Melissa K, RN    Supporting Steps:    Pt has attended  a health care  directive clas-  s, is awaiting  to see her law-  elizabeth to finalize  the document.  10/24/17 Pt  informed of CPR  vs intubation  and information  mailed out to  patient.             Equal Access to Services     [facility]: Hadii rakesh sanchezo Sojordin, waaxda luqadaha, qaybta kaalmada adeegyada, waxay marisabel brooks adenicolas moss . So [facility] [phone].    ATENCIÓN: Si habla español, tiene a merchant disposición servicios gratuitos de asistencia lingüística. Llame al [phone].    We comply with applicable federal civil rights laws and [place] laws. We do not discriminate on the basis of race, color, national origin, age, disability, sex, sexual orientation, or gender identity.            Thank you!     Thank you for  [facility]  for your care. Our goal is always to provide you with excellent care. Hearing back from our patients is one way we can continue to improve our services. Please take a few minutes to complete the written survey that you may receive in the mail after your visit with us. Thank you!             Your Updated Medication List - Protect others around you: Learn how to safely use, store and throw away your medicines at www.disposemymeds.org.          This list is accurate as of 8/20/18  3:16 PM.  Always use your most recent med list.                   Brand Name Dispense Instructions for use Diagnosis    ACETAMINOPHEN PO      Take 1,000 mg by mouth 2 times daily as needed        albuterol (2.5 MG/3ML) 0.083% neb solution      Take 1 vial by nebulization every 6 hours as needed for shortness of breath / dyspnea or wheezing        apixaban ANTICOAGULANT 2.5 MG tablet    ELIQUIS    180 tablet    Take 1 tablet (2.5 mg) by mouth 2 times daily    Atrial flutter, paroxysmal (H)       azaTHIOprine 50 MG tablet    IMURAN    90 tablet    Take 1 tablet (50 mg) by mouth daily    Rheumatoid arthritis involving multiple sites with positive rheumatoid factor (H)       Blood Pressure Monitor Kit     1 kit    1 kit daily as needed    CHF (congestive heart failure) (H)       calcium-vitamin D 600-400 MG-UNIT per tablet    CALTRATE     Take 1 tablet by mouth 2 times daily        COMPOUNDED NON-CONTROLLED SUBSTANCE - PHARMACY TO MIX COMPOUNDED MEDICATION    CMPD RX    30 g    Estriol 1mg/gram. Place 1 gram vaginally daily for two weeks, then vaginally twice weekly after.    Atrophic vaginitis       fish oil-omega-3 fatty acids 1000 MG capsule      Take 2 g by mouth daily. 2 capsules daily        folic acid 1 MG tablet    FOLVITE    90 tablet    Take 1 tablet (1 mg) by mouth daily    Oral aphthae       GENTEAL MILD OP      Apply  to eye daily.        hydrochlorothiazide 25 MG tablet    HYDRODIURIL    30  tablet    Take 1 tablet (25 mg) by mouth daily (with breakfast)    Hypertension goal BP (blood pressure) < 130/80       hydrocortisone 2.5 % cream     30 g    Apply BID to affected region(s) for 7-10 days.    Rash       levothyroxine 75 MCG tablet    SYNTHROID/LEVOTHROID    90 tablet    TAKE ONE TABLET BY MOUTH ONCE DAILY    Hypothyroidism, unspecified type       loratadine 10 MG tablet    CLARITIN     Take 10 mg by mouth every morning        losartan 50 MG tablet    COZAAR    180 tablet    Take 1 tablet (50 mg) by mouth 2 times daily    Heart failure with preserved ejection fraction, NYHA class I (H), Hypertension goal BP (blood pressure) < 130/80       metoprolol succinate 25 MG 24 hr tablet    TOPROL-XL    90 tablet    Take 1 tablet (25 mg) by mouth daily    HTN (hypertension)       nitroGLYcerin 0.4 MG sublingual tablet    NITROSTAT    25 tablet    Place 1 tablet (0.4 mg) under the tongue every 5 minutes as needed for chest pain    Chest pain       * order for DME     1 Box    Equipment being ordered: Dispense face mask. Mrs. Spicer is immunosuppressed due to rheumatoid arthritis.    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 Use with Albuterol.    Hypoxia       order for DME     1 each    Equipment being ordered: Dispense baffle, for use with nebulizer.    Pneumonia of left upper lobe due to Mycoplasma pneumoniae       * order for DME     1 each    Dispense SP Walker-small    Pain of toe of right foot, Status post bunionectomy       PRESERVISION AREDS PO      Take 1 tablet by mouth daily        propafenone 150 MG Tabs tablet    RYTHMOL    90 tablet    Take 1 tablet (150 mg) by mouth 3 times daily    Atrial flutter (H)       ranibizumab 0.3 MG/0.05ML Soln    LUCENTIS     0.3 mg by Intravitreal route Every 6 weeks        ranitidine 150 MG tablet    ZANTAC    60 tablet    Take 1 tablet (150 mg) by mouth 2  times daily    Gastroesophageal reflux disease without esophagitis       REFRESH P.M. Oint      Apply  to eye. Daily at bedtime        saccharomyces boulardii 250 MG capsule    FLORASTOR     Take 250 mg by mouth daily        senna-docusate 8.6-50 MG per tablet    SENOKOT-S;PERICOLACE    120 tablet    Take 2 tablets by mouth At Bedtime Hold if diarrhea occurs.    Constipation, chronic       STATIN NOT PRESCRIBED (INTENTIONAL)      Please choose reason not prescribed, below    Hyperlipidemia LDL goal <100       VITAMIN C PO      Take 500 mg by mouth daily        ZYRTEC ALLERGY 10 MG tablet   Generic drug:  cetirizine      Take 10 mg by mouth At Bedtime        * Notice:  This list has 3 medication(s) that are the same as other medications prescribed for you. Read the directions carefully, and ask your doctor or other care provider to review them with you.

## 2018-08-20 NOTE — PROGRESS NOTES
CARDIOLOGY CLINIC FOLLOW UP    Referring Provider:  Established Patient  Primary Care Provider:   Marianne Basurto    HPI: Linda Spicer is a 87 year old female who returns to the cardiology clinic for ongoing evaluation of HFpEF and recent paroxysmal atrial flutter.  The patient's risk factor profile is: (+) HTN, (-) diabetes, (-) hyperlipidemia, (-) tobacco use, (+) family Hx CAD [mother, sister]. The patient was diagnosed with HFpEF in Oct 2014.  She had a dobutamine ECHO (Oct 2014) that was normal.  She had a RHC in Oct 2014 that showed normal hemodynamics at baseline although the CO was mildly depressed.  With fluid challenge, the left sided filling pressures dramatically increased. The results of her prior dobutamine ECHO and RHC are noted below; she has not undergone coronary angiography.    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 She had PFTs in Aug 2015 that showed moderate lung diffusion defect.  She did not desaturate after walking 6 minutes.  Inhalers have not provided any benefit.   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  She has been recuperating gradually.    She presented in August 2016 with chest pain, dizziness, and lightheadedness.  She was in atrial flutter at that time but converted back to NSR while in the ER.  She was started on metoprolol XL 12.5 mg daily, losartan was decreased to 50 mg daily, and spironolactone 25 mg qd was continued.  Her ECG did not show ischemic changes and her troponins were negative.  She was treated on heparin but anticoagulation was deferred and she was given ASA 81 mg qd.  She was switched from ASA to Eliquis in Nov 2016.    In Feb 2017 she was started to have recurrent atrial flutter.  She was treated with higher dose of beta blocker but developed bradycardia and near syncope and her beta blocker was stopped.   She returned to [facility] with tachycardia and a pacemaker was placed and she was restarted on Toprol XL 12.5 mg qd.  Her losartan was decreased to 25 mg qd and her spironolactone was continued at 25 mg qd.  She was started on Propofenone at that time.    She was seen at [facility] ER in July 2017 with chest pain.  It had been occurring in the setting of resuming an exercise program and the left sided chest pain was attributed to musculoskeletal pain. ECG showed atrial pacing.    .  Troponin < 0.045. CXR negative.    She returned to the ER in Aug 2017 after mixing up her medications.  Her Toprol XL had been increased to 25 mg qd but she had accidentally taken Losartan 50 mg qd instead of 25 mg qd.  She noticed HR had increased to 100-110 and she had lightheadedness.  She was returned to Toprol XL 12.5 mg qd and Losartan 25 mg qHS, and Spironolactone 25 mg qd.    She had recurrent ER visits in Nov 2017, Jan 2018, and most recently Feb 2018.  She felt palpitations, lightheadedness, and shortness of breath.  She believes she was in atrial flutter on each occasion. Her pacemaker was interrogated on 2/21/18:    Data shows 5 AT/AF episodes indicating atrial arrhythmias. This equals 7% of the time since 1/15/18. Longest episodes listed is 3 days with atrial rates up to >400bpm. Current episode appears to have started today, 2/21/18, at 0517. Vrates during AT/AF are <110bpm about 95%. Patient has a hx of PAF.  Data shows 3 non-sustained VT episodes, 1-3sec, Vrates 162-175bpm, all episodes were 1/24/18@1044 and were actually the same episode of NSVT lasting ~6 sec. Atrial pacing at 72.1%.  Ventricular pacing at 5.1%.  Presenting rhythm is AS- (AF).    She was discharged from the ER on Apixaban 2.5 BID and Toprol XL 25 mg qd.  Her home BP have been 116-192/58-93 mm Hg and HR 60 - 66.  Her Losartan, previously 25 mg qAM, was switch to 50 mg qHS.      She was seen by Dr. Comer in Feb 2018.  The six-minute walk distance (600 ft)  is reduced. Her O2 saturation was 96%.  Mild restriction defect.  Mild diffusion defect. The diffusing capacity was not corrected for the patient's hemoglobin.  She remains compliant with her medications.  Of note, she had ABP with pH 7.50 and pCO2 30 confirming hyperventilation in 2014.    She had a fall in July 2018 after experiencing a fall with fractured S3.  She has been using a walker to ambulate and a wheelchair when necessary.      The patient continues to experience SOB when she speaks and she begins to hyperventilate.  She also notes RUSSELL with walking and she has to pace herself.  The symptoms begin within seconds of activity.  She denies PND, orthopnea, pedal edema.  She denies palpitations.  The lightheadedness has improved since the last visit.  No syncope.    Her BPs were previously elevated in the 125-145/50-65 mm Hg range.  Her more recent BPs have been less well controlled.    PAST MEDICAL HISTORY:  Past Medical History:   Diagnosis Date     Adjustment disorder with anxious mood 5/18/2015     Advanced directives, counseling/discussion 8/30/2012    Patient states has Advance Directive and will bring in a copy to clinic. 8/30/2012   T[facility] Medical Assistant \       Anemia 9/25/2015     Basal cell cancer      CHF (congestive heart failure) (H) 9/18/2014     CKD (chronic kidney disease) stage 3, GFR 30-59 ml/min 9/29/2015     DDD (degenerative disc disease), lumbar 9/25/2015     Diffuse idiopathic pulmonary fibrosis (H) 5/6/2013     Encounter for palliative care 5/18/2015     History of blood transfusion 9/29/2015     Hypertension goal BP (blood pressure) < 140/90 9/7/2012     Hypothyroid 9/7/2012     Irritable bowel syndrome 10/29/2013     Macular degeneration      Macular degeneration, 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     Nondisplaced spiral fracture of shaft of humerus      Osteoporosis 8/13/2013     Im[employer] Update utility     RA (rheumatoid arthritis) (H) 5/7/2013     Rheumatic fever      Shingles       Spinal stenosis of lumbar region with neurogenic claudication 9/14/2015       CURRENT MEDICATIONS:  Current Outpatient Prescriptions   Medication Sig Dispense Refill     ACETAMINOPHEN PO Take 1,000 mg by mouth 2 times daily as needed        albuterol (2.5 MG/3ML) 0.083% neb solution Take 1 vial by nebulization every 6 hours as needed for shortness of breath / dyspnea or wheezing       apixaban ANTICOAGULANT (ELIQUIS) 2.5 MG tablet Take 1 tablet (2.5 mg) by mouth 2 times daily 180 tablet 3     Artificial Tear Ointment (REFRESH P.M.) OINT Apply  to eye. Daily at bedtime          Ascorbic Acid (VITAMIN C PO) Take 500 mg by mouth daily        azaTHIOprine (IMURAN) 50 MG tablet Take 1 tablet (50 mg) by mouth daily 90 tablet 2     calcium-vitamin D (CALTRATE) 600-400 MG-UNIT per tablet Take 1 tablet by mouth 2 times daily        cetirizine (ZYRTEC ALLERGY) 10 MG tablet Take 10 mg by mouth At Bedtime       fish oil-omega-3 fatty acids (FISH OIL) 1000 MG capsule Take 2 g by mouth daily. 2 capsules daily            folic acid (FOLVITE) 1 MG tablet Take 1 tablet (1 mg) by mouth daily 90 tablet 3     hydrochlorothiazide (HYDRODIURIL) 25 MG tablet Take 1 tablet (25 mg) by mouth daily (with breakfast) 30 tablet 5     hydrocortisone 2.5 % cream Apply BID to affected region(s) for 7-10 days. 30 g 0     Hypromellose (GENTEAL MILD OP) Apply  to eye daily.       levothyroxine (SYNTHROID/LEVOTHROID) 75 MCG tablet TAKE ONE TABLET BY MOUTH ONCE DAILY 90 tablet 1     loratadine (CLARITIN) 10 MG tablet Take 10 mg by mouth every morning       losartan (COZAAR) 50 MG tablet Take 1 tablet (50 mg) by mouth 2 times daily 180 tablet 1     metoprolol succinate (TOPROL-XL) 25 MG 24 hr tablet Take 1 tablet (25 mg) by mouth daily 90 tablet 3     Multiple Vitamins-Minerals (PRESERVISION AREDS PO) Take 1 tablet by mouth daily        nitroglycerin (NITROSTAT) 0.4 MG SL tablet Place 1 tablet (0.4 mg) under the tongue every 5 minutes as needed for chest  pain 25 tablet 0     propafenone (RYTHMOL) 150 MG TABS tablet Take 1 tablet (150 mg) by mouth 3 times daily 90 tablet 3     ranibizumab (LUCENTIS) 0.3 MG/0.05ML SOLN 0.3 mg by Intravitreal route Every 6 weeks       ranitidine (ZANTAC) 150 MG tablet Take 1 tablet (150 mg) by mouth 2 times daily 60 tablet 5     saccharomyces boulardii (FLORASTOR) 250 MG capsule Take 250 mg by mouth daily       senna-docusate (SENOKOT-S;PERICOLACE) 8.6-50 MG per tablet Take 2 tablets by mouth At Bedtime Hold if diarrhea occurs. 120 tablet 11     Blood Pressure Monitor KIT 1 kit daily as needed 1 kit 0     COMPOUNDED NON-CONTROLLED SUBSTANCE (CMPD RX) - PHARMACY TO MIX COMPOUNDED MEDICATION Estriol 1mg/gram. Place 1 gram vaginally daily for two weeks, then vaginally twice weekly after. 30 g 6     order for DME Dispense SP Walker-small 1 each 0     order for DME Equipment being ordered: Dispense baffle, for use with nebulizer. 1 each 0     order for DME Equipment being ordered: Nebulizer. Use with Albuterol. 1 each 0     order for DME Equipment being ordered: Dispense face mask.  Mrs. Spicer is immunosuppressed due to rheumatoid arthritis. 1 Box 11     STATIN NOT PRESCRIBED, INTENTIONAL, Please choose reason not prescribed, below         PAST SURGICAL HISTORY:  Past Surgical History:   Procedure Laterality Date     APPENDECTOMY       BIOPSY      hemorrhoidectomy     ENT SURGERY      tonsillectomy     GYN SURGERY      3 D & C's     HYSTERECTOMY, PAP NO LONGER INDICATED       LAMINECTOMY LUMBAR ONE LEVEL N/A 10/13/2015    Procedure: LAMINECTOMY LUMBAR ONE LEVEL;  Surgeon: Fransico Toussaint MD;  Location: [facility] OR       ALLERGIES  Cephalexin hcl; Gabapentin; Naproxen; Perfume; Lactase; Macrobid [nitrofurantoin anhydrous]; Seasonal allergies; Sulfa drugs; Xanax [alprazolam]; and Ciprofloxacin    FAMILY HX:  Family History   Problem Relation Age of Onset     Hypertension Mother      Psychotic Disorder Father      Diabetes Son      Diabetes  Daughter      Blood Disease Daughter        SOCIAL HX:  History     Social History     Marital Status:      Spouse Name: N/A     Number of Children: N/A     Years of Education: N/A     Social History Main Topics     Smoking status: Never Smoker      Smokeless tobacco: Never Used     Alcohol Use: Yes      Comment: rare wine      Drug Use: No     Sexual Activity: No     Other Topics Concern     None     Social History Narrative    . Has six children. She enjoys bridge and [employer]. Her  has cancer. Her son has financial and alcohol issues.       ROS:  Constitutional: No fever, chills, or sweats. No weight gain/loss.   HEENT: No visual disturbance, ear ache, epistaxis, sore throat.   Allergies/Immunologic: Negative.   Respiratory:(-) cough, (-) hemoptysis.   Cardiovascular: As per HPI.   GI: No nausea, vomiting, hematemesis, melena, or hematochezia.   : No urinary frequency, dysuria, or hematuria.   Integument: No rash.   Psychiatric: No anxiety / depression.   Neuro: No speech disturbance, focal sensory or motor deficit.   Endocrinology: No polyuria / polyphagia.   Musculoskeletal: No myalgia.    VITAL SIGNS:  /78  Pulse 60  Wt 75.2 kg (165 lb 12.8 oz)  LMP  (LMP Unknown)  SpO2 97%  BMI 28.46 kg/m2  Body mass index is 28.46 kg/(m^2).  Wt Readings from Last 2 Encounters:   08/20/18 75.2 kg (165 lb 12.8 oz)   08/15/18 74.9 kg (165 lb 1.6 oz)       PHYSICAL EXAM  Linda Spicer is a 87 year old female.in no acute distress.  HEENT: Eyes Nonicteric.  Neck: JVP normal.  Carotids +3/3 bilaterally without bruits.  Lungs: Bilateral crackles about 1/2 the way up, unchanged. Cor RRR. Normal S1 and S2.  No murmur, rub, or gallop.  PMI in Lf 5th ICS.  Abd: Soft, nontender, nondistended.  NABS.  No pulsatile mass.  Extremities: No C/C.  Trace edema, Rt > Lf.  Pulses +3/3 symmetric in upper and lower extremities.  Neuro: Grossly intact.  Psych: A&O x 3.  Skin: No rash.    LABS  None    Recent Labs    Lab Test  17   1214  16   0756  14   0821   CHOL  146  171  155   HDL  50  47*  42*   LDL  76  109*  97   TRIG  101  75  84   CHOLHDLRATIO   --    --   3.7       EK18  Atrial paced rhythm, prolonged conduction.  Nonspecific ST-T wave changes.    EK16  SB at 54.  Intervals: 0.17/0.076/0.404.  Isolated Q wave in III.  No other abnormalities.    EK/7/15  SB at 49.  Q waves in III and aVF.  No change from ECG () by report but I am not able to locate this or any other ECGs.    ECHO: None    DOBUTAMINE ECHO STRESS TEST:  14  Interpretation Summary  The EKG portion of this stress test was negative for inducible ischemia (see echo results below). Normal resting wall motion and no stress-induced wall motion abnormality.    Baseline  Normal baseline electrocardiogram.  Normal left ventricular wall motion.    Stress  The maximum dose of dobutamine was 20mcg/kg/min.  Target Heart Rate was achieved.  The EKG portion of this stress test was negative for inducible ischemia (see echo results below).  Arrhythmia induced during stress: occasional PVC's and PAC's.  Normal resting wall motion and no stress-induced wall motion abnormality.    Left Ventricle  There is mild concentric left ventricular hypertrophy.  Left ventricular systolic function is normal.    Right Ventricle  The right ventricle is normal in size and function.    Atria  The left atrium is moderate to severely dilated.    Mitral Valve  There is mild (1+) mitral regurgitation.    Tricuspid Valve  There is mild (1+) tricuspid regurgitation.  The right ventricular systolic pressure is approximated at 39 mmHg plus the   right atrial pressure.    Aortic Valve  There is trace aortic regurgitation.    Pericardial/Pleural  There is no pericardial effusion.      RIGHT HEART CATH: 14  RA 3  RV 36/3  PA 36/13 (mean 21)  PCW 10  Fluid challenge 500 ml NS --> PA 46/18 (mean 27 mm Hg), PCW 18 with prominent v waves    ECHO:  5/26/2016   1.  LV function is normal. The visually estimated ejection fraction is 65%.   2. No regional wall motion abnormalities.   3. Moderately enlarged right ventricle with mild decreased RV functions.   4. Diastolic filling consistent with diastolic dysfunction.   5. Moderate mitral valve regurgitation.   6. Moderately dilated left atrium.   7. Pulmonary hypertension is present.   8. Moderately elevated pulmonary pressure estimated at 40.0 mmHg plus right atrial pressure.    LEXISCAN:  5/27/16  1. Typical Lexiscan induced chest discomfort.  2. Typical symptoms with Lexiscan infusion; probable adequate vasodilation response.  3. Normal stress EKG.  4. Abnormal baseline blood pressure 207/71.  5. Ejection fraction 76%.  6. No discrete regional wall motion abnormalities are identified.  7. Normal myocardial perfusion with no evidence for focal myocardial ischemia or myocardial infarction.      PFTs  (8/21/15)  The six-minute walk distance is normal.  There is no significant desaturation during the six-minute walk done on room air.  No significant airflow obstruction.  Moderate Diffusion Defect   Compared with testing from 1/15/2015 there has been an increase in the FVC.    CORONARY ANGIOGRAPHY:  None    ARTERIAL US LLE:  12/2/17  No evidence of significant arterial occlusive disease in the left leg.    CT chest w/o contrast (2/20/2017)  IMPRESSION:   Interval increase in peripheral lower lobe predominant fibrotic  changes with traction bronchiectasis in a UIP pattern. Redemonstrated  honeycombing with interval enlargement of cystic change.      ASSESSMENT AND PLAN:   1. Heart Failure, preserved LVEF.  The patient has significant RUSSELL, occurring with minimal activities. A component of this may be related to HFpEF.  Previously, I performed RHC and the left sided filling pressures were normal.  She tried a low dose diuretic without impact.  She continues on Losartan 50 mg qHS and Toprol XL 25 mg qAM.  We will monitor  for worsening pulmonary symptoms on the beta blocker  Continue with light exercise. Avoid added salt and processed foods.  Of note, the RHC essentially ruled out pulmonary artery hypertension but that was 3 years ago.  A repeat RHC may be required.    2. Atrial Flutter, Paroxysmal.  I would equate paroxysmal atrial flutter to PAF in regard to risk of stroke or systemic embolism.   The patient has a CHADS2-Vasc score of 4, corresponding to a 4.0% annual stroke / systemic emolism event rate. The patient has Stage III CKD with GFR 39 - 42 ml/min. She is on Propofenone 150 TID.  She is on Eliquis 2.5 mg BID given the borderline Cr and the age > 80 yrs.  She has concerns over the interaction of coumadin with her food. Pacemaker interrogation per protocol, at [facility].  Her pacemaker was last interrogated in June 2018 at which time function was normal.  AF 7% burden.  AP 78%   14%. 647 AT/AF episodes, ranging from 1 min to 7.5 hours. However, the symptoms do not correlate with the arrhythmia.      3. Rheumatoid Pulmonary Fibrosis.  Per Dr. Comer, she had severe dyspnea and hyperventilation in the past now showing significant improvement in ventilatory control and symptoms and improvement in pulmonary function and exercise tolerance.  There was a concern that patient was destaturating but she did not do so on 6 minute walk test.  She is up to date on vaccinations and flu shots.  F/U with pulmonary medicine at [facility].      4. Hypertension.  Previously well controlled on combination of Toprol, and Losartan.  Restrict Na.  Sporadic BPs in 160s.  Monitor BP and if persistent SBP> 140, adjust Rx.    5. Hyperventilation.  I think this may explain a significant portion of her symptoms.  It was confirmed on ABG.  Clearly, with minimal effort, as she begins to speak, the symptoms develop.  I would like to try a pulmonary rehabilitation consultation.  I would like to try a course of Ativan for one week, 1 mg BID for one  week in the event anxiety is a significant component.Walked through the cooridor and her sats remained 96 or greater.    FOLLOW UP:  6 months    Emeterio Loza MD    Divisions of Cardiology  [place], [place]    CC  ESTABLISHED PATIENT

## 2018-08-20 NOTE — NURSING NOTE
Linda Spicer's goals for this visit include: return  She requests these members of her care team be copied on today's visit information:     PCP: Tre Lockett    Referring Provider:  Emeterio Loza MD  [address] [place] [address] [address]  [place] [address]    /78  Pulse 60  Wt 75.2 kg (165 lb 12.8 oz)  LMP  (LMP Unknown)  SpO2 97%  BMI 28.46 kg/m2    Do you need any medication refills at today's visit? y

## 2018-08-20 NOTE — PATIENT INSTRUCTIONS
The following is a summary of your office visit:    Medications started today:    Medications stopped today:    Medication dose change:     Nurse contact information: Stacey Perez RN  Cardiology Care Coordinator  [phone] Phone  [phone] Fax    Appointments made today:     Patient instructions:    If you have had any blood work, imaging or other testing completed we will be in touch within 1-2 weeks regarding the results. If you have any questions, concerns or need to schedule a follow up, please contact us at [phone]. If you are needing refills please contact your pharmacy. For urgent after hour care please call the [facility] at [phone] or the [facility] at [phone] and ask to speak to the cardiologist on call.    It was a pleasure meeting with you today. Please let us know if there is anything else we can do for you so that we can be sure you are leaving completely satisfied with your care experience.     Your Cardiology Team at [facility]  RN Care Coordinator: Stacey  Please call [phone] and ask for Cardiology with any new symptoms, questions, or concerns.     For urgent after hour care, please call the [facility] at [phone], or the [facility] at [phone], and ask to speak to the cardiologist on call.    When to Call Your Doctor  Call your doctor if you have any of the following:  Changed or worsened chest pain.  Chest pain that started within the past 2 months and is now more severe.   Chest pain that happens 3 or more times per day.   Chest pain that suddenly becomes more frequent or severe, lasts longer, or is brought on by less exertion than before.   Chest pain that occurs at rest, with no obvious exertion or stress. It might wake you from sleep.  Call 911 or other emergency services if you have CAD that has been diagnosed by a doctor and you have chest pain that doesn't  go away after using your home treatment plan for angina.  When in Doubt:  If you aren't sure if your symptoms are serious or decide not to call 911, call our [facility] at [phone] or the [facility] at [phone] and ask to speak to the cardiologist on call. They can help you decide if your pain is an emergency or not.

## 2018-08-21 ENCOUNTER — PATIENT OUTREACH (OUTPATIENT)
Dept: CARE COORDINATION | Facility: CLINIC | Age: 83
End: 2018-08-21

## 2018-08-21 ENCOUNTER — TELEPHONE (OUTPATIENT)
Dept: FAMILY MEDICINE | Facility: CLINIC | Age: 83
End: 2018-08-21

## 2018-08-21 DIAGNOSIS — I10 HYPERTENSION GOAL BP (BLOOD PRESSURE) < 150/90: Primary | ICD-10-CM

## 2018-08-21 RX ORDER — CHLORTHALIDONE 25 MG/1
25 TABLET ORAL DAILY
Qty: 30 TABLET | Refills: 5 | Status: SHIPPED | OUTPATIENT
Start: 2018-08-21 | End: 2018-08-22

## 2018-08-21 NOTE — LETTER
[facility]  Complex Care Plan  About Me  Patient Name:  Linda Walter    YOB: 1931  Age:     87 year old   [place] MRN:   [id number] Telephone Information:    Home Phone [phone]   Mobile [phone]       Address:    [address]  [facility] [address] Email address:  [email or web address]      Emergency Contact(s)  Name Relationship Lgl Grd Work Phone Home Phone Mobile Phone   1. JENNIFER GAONA Daughter No NONE NONE [phone]   2. LEA WALTER Spouse No none [phone]   3. KAROLINA WALTER Son No NONE NONE [phone]   4. CAITY WALTER Son No none [phone]           Primary language:  English     needed? No   [employer]:  [phone] op. 1  Other communication barriers:    Preferred Method of Communication:  Mail  Current living arrangement: I live in a private home with spouse  Mobility Status/ Medical Equipment: Independent    Health Maintenance  Health Maintenance Reviewed: Up to date    My Access Plan  Medical Emergency 911   Primary Clinic Line [facility] - [phone]   24 Hour Appointment Line [phone] or  [phone] ([phone]) (toll-free)   24 Hour Nurse Line [phone] (toll-free)   Preferred Urgent Care [place] [phone]   Preferred Hospital [facility]  [phone]   Preferred Pharmacy [facility] - [place], [place] - [address]     Behavioral Health Crisis Line The National Suicide Prevention Lifeline at 1-475.763.4829 or 911     My Care Team Members    Patient Care Team       Relationship Specialty Notifications Start End    Tre Lockett MD PCP - General Internal Medicine  12/14/15     Phone: [phone] Fax: [phone]         [address] [facility] [address]    Joey Ovalle MD MD Family Medicine - Sports Medicine  9/10/15     Phone:  [phone] Fax: [phone]         [address] [facility] [address]    Behl, Melissa K, RN Lead Care Coordinator Primary Care - CC Admissions 7/9/18     Phone: [phone] Fax: [phone]                My Care Plans  Self Management and Treatment Plan  Goals and (Comments)  Goals        General    #1 Medication  (pt-stated)     Notes - Note created  8/21/2018 10:37 AM by Behl, Melissa K, RN    Goal Statement: I will take my medications as prescribed.  Measure of Success: Patient and home care will report consistently taking medication as prescribed.  Supportive Steps to Achieve: RN CC notified home care RN of medication error.  Barriers: multiple complex chronic health conditions  Strengths: has home care, care coordination and excellent family support  Date to Achieve By: 9/21/18  Patient expressed understanding of goal: yes         #2 I will complete my health care directive. (pt-stated)     Notes - Note edited  8/21/2018 10:39 AM by Behl, Melissa K, RN    Goal Statement: I will complete my health care directive.  Measure of Success: Health care directive will be completed and scanned into chart.  Supportive Steps to Achieve: Patient has attended a health care directive class, 10/24/17 informed of CPR vs intubation  Barriers: is awaiting to see her  to finalize document  Strengths: has care coordination, home care and excellent family support  Date to Achieve By: 12/31/18  Patient expressed understanding of goal: yes                    Action Plans on File:                       Advance Care Plans/Directives Type:        My Medical and Care Information  Problem List   Patient Active Problem List   Diagnosis     ACP (advance care planning)     Hypertension goal BP (blood pressure) < 150/90     Hypothyroidism     Macular degeneration, left eye     Irritable bowel syndrome     Encounter for palliative care     Adjustment disorder with anxious mood     Mild anemia     DDD (degenerative disc  disease), lumbar     CKD (chronic kidney disease) stage 3, GFR 30-59 ml/min     History of blood transfusion     Aftercare following surgery     S/P lumbar laminectomy     High risk medication use     Age-related osteoporosis without current pathological fracture     Atrophic vaginitis     Fecal incontinence     Female stress incontinence     Impingement syndrome of both shoulders     UIP (usual interstitial pneumonitis) (H)     High risk medications (not anticoagulants) long-term use     Heart failure with preserved ejection fraction (H)     Congestive heart failure with preserved LV function, NYHA class 3 (H)     Other specified hypothyroidism     Rheumatoid arthritis involving multiple sites with positive rheumatoid factor (H)     Health Care Home     Sick sinus syndrome (H)     Cardiac pacemaker - Medtronic dual lead pacemaker - Not Dependent - MRI Safe     Immunosuppression (H)     ILD (interstitial lung disease) (H)     Heart failure with preserved ejection fraction, NYHA class I (H)     Atrial flutter (H)      Current Medications and Allergies:  See printed Medication Report.    Care Coordination Start Date: 8/21/2018   Frequency of Care Coordination: monthly   Form Last Updated: 08/21/2018

## 2018-08-21 NOTE — TELEPHONE ENCOUNTER
"Patient calls into RN CC will the following concerns:  1. Patient states she was taking hydrochlorothiazide 25 mg, 3 times daily since it was prescribed on 8/9/18.  \"I know the bottle only said 1 time/day, but I swear he told me 3 times/day.\"  Patient states she ran out and is requesting a new prescription for hydrochlorothiazide 25 mg 3 times daily.  RN CC reviewed that this was much too medication and will update PCP.  Patient states she can  a refill if she pays out of pocket, which is what she plans on doing.  RN CC advised patient to only take medication as prescribed/directed on the bottle and to contact the pharmacy or PCP if she has any questions prior to making changes on her own.  Patient is currently enrolled in home care through [employer] and RN CC will alert the RN Case Manager of patient's medication error.  RN CC will also request home care to assist patient with medication management.    Please advise of any further recommendations regarding patient's medication error.    2. Patient reports her left sided low back pain is worsening since seen on 8/9/18.  Patient states she is needing to use her wheelchair more often than before.  She denies any other new/accompanying symptoms and states the intensity of the pain is worse.  Unable to state a number.  Patient states she has an appointment with PCP on 8/24/18.  RN CC reviewed chart and patient does not have an appointment scheduled for 8/24/18, but her spouse does.  Patient inquires if she can be seen 8/24/18 with spouse 11:00 am.    Please advise.    Melissa Behl BSN, RN, N  [facility] Care Coordinator  [phone]      "

## 2018-08-21 NOTE — TELEPHONE ENCOUNTER
RN CC left a message with patient's spouse requesting a call back.    Melissa Behl BSN, RN, N  [facility] Care Coordinator  [phone]

## 2018-08-21 NOTE — PROGRESS NOTES
"Clinic Care Coordination Contact    Clinic Care Coordination Contact  OUTREACH    Referral Information:  Referral Source: Self-patient/Caregiver    Primary Diagnosis: CHF    Chief Complaint   Patient presents with     Clinic Care Coordination - Follow-up     RN        Universal Utilization: Patient is followed by rheumatology, cardiology, endocrinology and pulmonology  Clinic Utilization  Difficulty keeping appointments:: No  Utilization    Last refreshed: 8/20/2018  3:35 PM:  No Show Count (past year) 1       Last refreshed: 8/20/2018  3:35 PM:  ED visits 0       Last refreshed: 8/20/2018  3:35 PM:  Hospital admissions 0          Current as of: 8/20/2018  3:35 PM             Clinical Concerns:  Current Medical Concerns:  Note      Patient calls into RN CC will the following concerns:  1. Patient states she was taking hydrochlorothiazide 25 mg, 3 times daily since it was prescribed on 8/9/18.  \"I know the bottle only said 1 time/day, but I swear he told me 3 times/day.\"  Patient states she ran out and is requesting a new prescription for hydrochlorothiazide 25 mg 3 times daily.  RN CC reviewed that this was much too medication and will update PCP.  Patient states she can  a refill if she pays out of pocket, which is what she plans on doing.  RN CC advised patient to only take medication as prescribed/directed on the bottle and to contact the pharmacy or PCP if she has any questions prior to making changes on her own.  Patient is currently enrolled in home care through [employer] and RN CC will alert the RN Case Manager of patient's medication error.  RN CC will also request home care to assist patient with medication management.    Please advise of any further recommendations regarding patient's medication error.     2. Patient reports her left sided low back pain is worsening since seen on 8/9/18.  Patient states she is needing to use her wheelchair more often than before.  She denies any other " new/accompanying symptoms and states the intensity of the pain is worse.  Unable to state a number.  Patient states she has an appointment with PCP on 8/24/18.  RN CC reviewed chart and patient does not have an appointment scheduled for 8/24/18, but her spouse does.  Patient inquires if she can be seen 8/24/18 with spouse 11:00 am.           See 8/21/18 telephone encounter sent to PCP.    Patient Active Problem List   Diagnosis     ACP (advance care planning)     Hypertension goal BP (blood pressure) < 150/90     Hypothyroidism     Macular degeneration, left eye     Irritable bowel syndrome     Encounter for palliative care     Adjustment disorder with anxious mood     Mild anemia     DDD (degenerative disc disease), lumbar     CKD (chronic kidney disease) stage 3, GFR 30-59 ml/min     History of blood transfusion     Aftercare following surgery     S/P lumbar laminectomy     High risk medication use     Age-related osteoporosis without current pathological fracture     Atrophic vaginitis     Fecal incontinence     Female stress incontinence     Impingement syndrome of both shoulders     UIP (usual interstitial pneumonitis) (H)     High risk medications (not anticoagulants) long-term use     Heart failure with preserved ejection fraction (H)     Congestive heart failure with preserved LV function, NYHA class 3 (H)     Other specified hypothyroidism     Rheumatoid arthritis involving multiple sites with positive rheumatoid factor (H)     Health Care Home     Sick sinus syndrome (H)     Cardiac pacemaker - Medtronic dual lead pacemaker - Not Dependent - MRI Safe     Immunosuppression (H)     ILD (interstitial lung disease) (H)     Heart failure with preserved ejection fraction, NYHA class I (H)     Atrial flutter (H)        Current Behavioral Concerns: n/a      Education Provided to patient: RN CC educated patient on the importance of medication compliance and instructed patient to always check with PCP or pharmacy if  she questions whether she should take her medication different than what is listed on her bottle.   Pain  Pain (GOAL):: No  Health Maintenance Reviewed: Up to date  Clinical Pathway: None    Medication Management:  See above, patient was taking hydrochlorothiazide 3 times daily since 8/9/18 until she ran out.  Patient instructed on correct dose, PCP and home care RN Case Manager Filomena Javierkenneth [phone] were notified of the error.  RN CC requested for Filomena to assist patient with medication management while home care is involved.     Functional Status:  Dependent ADLs:: Ambulation-no assistive device  Bed or wheelchair confined:: No  Mobility Status: Independent    Living Situation:  Current living arrangement:: I live in a private home with spouse  Type of residence:: Apartment    Diet/Exercise/Sleep:  Diet:: Low cholesterol, Low saturated fat, No added salt  Inadequate nutrition (GOAL):: No  Food Insecurity: No  Tube Feeding: No  Exercise:: Currently not exercising  Inadequate activity/exercise (GOAL):: No  Significant changes in sleep pattern (GOAL): No    Transportation:  Transportation concerns (GOAL):: No  Transportation means:: Family, [place] mobility     Psychosocial:  Bahai or spiritual beliefs that impact treatment:: No  Mental health DX:: No  Mental health management concern (GOAL):: No  Informal Support system:: [place] based, Family, Friends, Neighbors, Spouse     Financial/Insurance:   Financial/Insurance concerns (GOAL):: No       Resources and Interventions:  Current Resources:   List of home care services:: Skilled Nursing, Home Health Aid, Physicial Therapy;   Community Resources: Home Care     Equipment Currently Used at Home: raised toilet, shower chair    Advance Care Plan/Directive  Advanced Care Plans/Directives on file:: In process  Advanced Care Plan/Directive Status: In Process    Referrals Placed: None     Goals:   Goals        General    #1 Medication  (pt-stated)     Notes - Note  created  8/21/2018 10:37 AM by Behl, Melissa K, RN    Goal Statement: I will take my medications as prescribed.  Measure of Success: Patient and home care will report consistently taking medication as prescribed.  Supportive Steps to Achieve: RN CC notified home care RN of medication error.  Barriers: multiple complex chronic health conditions  Strengths: has home care, care coordination and excellent family support  Date to Achieve By: 9/21/18  Patient expressed understanding of goal: yes         #2 I will complete my health care directive. (pt-stated)     Notes - Note edited  8/21/2018 10:39 AM by Behl, Melissa K, RN    Goal Statement: I will complete my health care directive.  Measure of Success: Health care directive will be completed and scanned into chart.  Supportive Steps to Achieve: Patient has attended a health care directive class, 10/24/17 informed of CPR vs intubation  Barriers: is awaiting to see her  to finalize document  Strengths: has care coordination, home care and excellent family support  Date to Achieve By: 12/31/18  Patient expressed understanding of goal: yes                     Patient/Caregiver understanding: Patient has fair understanding of her current plan of care, but has made a medication error in the past week.  Patient often needs guidance and reassurance from care coordination.    Outreach Frequency: monthly  Future Appointments              In 2 weeks DEVICE CHECK RN CARD [facility]    In 3 weeks Mario Davenport MD [facility]    In 3 weeks B[place] 8 INFUSION [facility]    In 1 month [place] 7 INFUSION [facility]    In 3 months PFT LAB [facility]    In 3 months Dea Acosta MD [facility]    In 6 months Emeterio Loza MD [facility]    In 6 months Tenisha  Asia Turcios PA-C [facility]          Plan:   1. Patient will take medications as prescribed and contact PCP or pharmacy if she questions the directions on the bottle instead of making decision of how to take it on her own.  2. RN CC contacted home care RN Case Manager Filomena May to update her on medication error.  3. RN CC sent message to PCP regarding medication error, see 8/21/18 telephone encounter.  4. RN CC will follow up with patient in 1-2 weeks.    Melissa Behl BSN, RN, N  [facility] Care Coordinator  [phone]

## 2018-08-22 RX ORDER — HYDROCHLOROTHIAZIDE 25 MG/1
25 TABLET ORAL DAILY
Qty: 30 TABLET | Refills: 5 | Status: ON HOLD | OUTPATIENT
Start: 2018-08-22 | End: 2018-10-11

## 2018-08-22 NOTE — TELEPHONE ENCOUNTER
This writer attempted to contact Linda on 08/22/18      Reason for call medication instructions and unable to leave message.    When RN called patient's phone number a woman answered the phone and had a great amount of difficulty using the phone. She kept saying hello and pushing buttons. This writer kept saying helllo but she continued to press buttons. RN called back and no one answered the phone.    If patient calls back:   Patient contacted by a Registered Nurse. Inform patient that someone from the RN group will contact them, document that pt called and route to P DYAD 3 RN POOL [[id number]]        Regla Escalona, RN

## 2018-08-22 NOTE — TELEPHONE ENCOUNTER
RN CC informed patient of Dr. Lockett's response below.  Patient states she would prefer to pay the $4 out of pocket for the replacement prescription of hydrochlorothiazide rather than switch to chlorthalidone.      FYI to PCP.    Melissa Behl BSN, RN, N  [facility] Care Coordinator  [phone]

## 2018-08-23 NOTE — TELEPHONE ENCOUNTER
Written rx faxed to the pharmacy, Pharmacy will contact pt when medication is ready for pickup.  Elmo Jimenez,  For Teams Comfort and Heart

## 2018-08-24 ENCOUNTER — MYC REFILL (OUTPATIENT)
Dept: FAMILY MEDICINE | Facility: CLINIC | Age: 83
End: 2018-08-24

## 2018-08-24 ENCOUNTER — TELEPHONE (OUTPATIENT)
Dept: FAMILY MEDICINE | Facility: CLINIC | Age: 83
End: 2018-08-24

## 2018-08-24 ENCOUNTER — OFFICE VISIT (OUTPATIENT)
Dept: FAMILY MEDICINE | Facility: CLINIC | Age: 83
End: 2018-08-24
Payer: MEDICARE

## 2018-08-24 VITALS
SYSTOLIC BLOOD PRESSURE: 118 MMHG | RESPIRATION RATE: 14 BRPM | OXYGEN SATURATION: 98 % | WEIGHT: 165.8 LBS | HEART RATE: 62 BPM | DIASTOLIC BLOOD PRESSURE: 64 MMHG | BODY MASS INDEX: 28.31 KG/M2 | TEMPERATURE: 98.4 F | HEIGHT: 64 IN

## 2018-08-24 DIAGNOSIS — M70.72: ICD-10-CM

## 2018-08-24 DIAGNOSIS — I10 HYPERTENSION GOAL BP (BLOOD PRESSURE) < 150/90: ICD-10-CM

## 2018-08-24 DIAGNOSIS — R10.2 PELVIC PAIN IN FEMALE: Primary | ICD-10-CM

## 2018-08-24 PROCEDURE — 99214 OFFICE O/P EST MOD 30 MIN: CPT | Performed by: INTERNAL MEDICINE

## 2018-08-24 RX ORDER — HYDROCHLOROTHIAZIDE 25 MG/1
25 TABLET ORAL DAILY
Qty: 30 TABLET | Refills: 5 | Status: CANCELLED | OUTPATIENT
Start: 2018-08-24

## 2018-08-24 ASSESSMENT — PAIN SCALES - GENERAL: PAINLEVEL: MILD PAIN (3)

## 2018-08-24 NOTE — TELEPHONE ENCOUNTER
Reason for Call:  Other Orders    Detailed comments: Missing Home Care order from 07/19/18 that need to be signed and faxed back to fax #   [phone]    Phone Number [facility]  can be reached at: Other phone number:  [phone]    Best Time: anytime    Can we leave a detailed message on this number? YES    Call taken on 8/24/2018 at 9:27 AM by Siva Swanson

## 2018-08-24 NOTE — TELEPHONE ENCOUNTER
This is a duplicate, see telephone encounter dated 8/21/18.  Elmo Jimenez,  For Teams [facility]fort [facility] Heart

## 2018-08-24 NOTE — PATIENT INSTRUCTIONS
At [facility], we strive to deliver an exceptional experience to you, every time we see you.  If you receive a survey in the mail, please send us back your thoughts. We really do value your feedback.    Based on your medical history, these are the current health maintenance/preventive care services that you are due for (some may have been done at this visit.)  There are no preventive care reminders to display for this patient.    Suggested websites for health information:  Www.[place].org : Up to date and easily searchable information on multiple topics.  Www.medlineplus.gov : medication info, interactive tutorials, watch real surgeries online  Www.familydoctor.org : good info from the Academy of Family Physicians  Www.cdc.gov : public health info, travel advisories, epidemics (H1N1)  Www.aap.org : children's health info, normal development, vaccinations  Www.health.[facility].[place].us : [place] dept of health, public health issues in [place], N1N1    Your care team:                            Family Medicine Internal Medicine   MD Tre Hicks MD Shantel Branch-Fleming, MD Katya Georgiev PA-C Megan Hill, APRN CNP    Obi Sharma MD Pediatrics   Benja Lancaster, PATonyC  Sary Caballero, CNP MD Mariann Vogel APRN CNP   MD Maritza Condon MD Deborah Mielke, MD Kim Thein, APRN CNP      Clinic hours: Monday - Thursday 7 am-7 pm; Fridays 7 am-5 pm.   Urgent care: Monday - Friday 11 am-9 pm; Saturday and Sunday 9 am-5 pm.  Pharmacy : Monday -Thursday 8 am-8 pm; Friday 8 am-6 pm; Saturday and Sunday 9 am-5 pm.     Clinic: [phone]   Pharmacy: [phone]

## 2018-08-24 NOTE — MR AVS SNAPSHOT
After Visit Summary   8/24/2018    Linda Spicer    MRN: [id number]           Patient Information     Date Of Birth          6/13/1931        Visit Information        Provider Department      8/24/2018 11:00 AM Tre Lockett MD [facility]        Today's Diagnoses     Pelvic pain in female    -  1    Ischiogluteal bursitis, left          Care Instructions    At [facility], we strive to deliver an exceptional experience to you, every time we see you.  If you receive a survey in the mail, please send us back your thoughts. We really do value your feedback.    Based on your medical history, these are the current health maintenance/preventive care services that you are due for (some may have been done at this visit.)  There are no preventive care reminders to display for this patient.    Suggested websites for health information:  Www.[employer] : Up to date and easily searchable information on multiple topics.  Www.Voxy.gov : medication info, interactive tutorials, watch real surgeries online  Www.familydoctor.org : good info from the Academy of Family Physicians  Www.cdc.gov : public health info, travel advisories, epidemics (H1N1)  Www.aap.org : children's health info, normal development, vaccinations  Www.health.[facility].[place].us : [place] dept of health, public health issues in [place], N1N1    Your care team:                            Family Medicine Internal Medicine   MD Tre Hicks MD Shantel Branch-Fleming, MD Katya Georgiev PA-C Megan Hill, SUNNI Sharma MD Pediatrics   ABY Snyder, MD Mariann Chaney APRN CNP   MD Maritza Condon MD Deborah Mielke, MD Kim Thein, APRN [facility]      Clinic hours: Monday - Thursday 7 am-7 pm; Fridays 7 am-5 pm.   Urgent care: Monday - Friday 11 am-9 pm; Saturday and Sunday 9 am-5 pm.  Pharmacy : Monday -Thursday 8 am-8 pm; Friday 8  am-6 pm; Saturday and Sunday 9 am-5 pm.     Clinic: [phone]   Pharmacy: [phone]                Follow-ups after your visit        Additional Services     ORTHOPEDICS ADULT REFERRAL       Your provider has referred you to: [facility] [phone]   [email or web address]    Please be aware that coverage of these services is subject to the terms and limitations of your health insurance plan.  Call member services at your health plan with any benefit or coverage questions.      Please bring the following to your appointment:    >>   Any x-rays, CTs or MRIs which have been performed.  Contact the facility where they were done to arrange for  prior to your scheduled appointment.    >>   List of current medications   >>   This referral request   >>   Any documents/labs given to you for this referral                  Your next 10 appointments already scheduled     Sep 05, 2018 10:00 AM CDT   Return Visit with DEVICE CHECK RN CARD   [facility])    [address] [place] [id number]   [phone]            Sep 11, 2018 10:00 AM CDT   Return Visit with Mario Davenport MD   [facility]    [address] [place] [id number]   [phone]            Sep 13, 2018  9:30 AM CDT   Level 1 with BAY 8 INFUSION   [facility])    [address] [place] [address]   [phone]            Oct 11, 2018  9:00 AM CDT   Level 1 with BAY 7 INFUSION   [facility])    [address] [place] [id number] [phone]            Dec 11, 2018 10:00 AM CST   Office Visit with PFT LAB   [facility])     [id number] [place] [address]   [phone]           Bring a current list of meds and any records pertaining to this visit. For Physicals, please bring immunization records and any forms needing to be filled out. Please arrive 10 minutes early to complete paperwork.            Dec 11, 2018 11:00 AM CST   [facility]    [address] [place] [id number]   [phone]            Feb 18, 2019  2:10 PM CST   [facility] Visit with Em[facility]io Loza [facility]   [facility]    [address] [place] [id number]   [phone]            Mar 01, 2019 10:00 AM CST   [facility] Visit with Asia Steven PA-C   [facility]    [address] [place] [id number] [phone]              Future tests that were ordered for you today     Open Future Orders        Priority Expected Expires Ordered    NM Bone &/Or Joint Scan Limited Routine  8/24/2019 8/24/2018    ORTHOPEDICS ADULT REFERRAL Routine  8/24/2019 8/24/2018            Who to contact     If you have questions or need follow up information about today's clinic visit or your schedule please contact [facility] directly at [phone].  Normal or non-critical lab and imaging results will be communicated to you by MyChart, letter or phone within 4 business days after the clinic has received the results. If you do not hear from us within 7 days, please contact the clinic through MyChart or phone. If you have a critical or abnormal lab result, we will notify you by phone as soon as possible.  Submit refill requests through [employer] or call your pharmacy and they will forward the refill request to us. Please allow 3 business days for your refill to be completed.          Additional Information About Your Visit        Pix[employer]art  "Information     Janeth gives you secure access to your electronic health record. If you see a primary care provider, you can also send messages to your care team and make appointments. If you have questions, please call your primary care clinic.  If you do not have a primary care provider, please call [phone] and they will assist you.        Care EveryWhere ID     This is your Care EveryWhere ID. This could be used by other organizations to access your [place] medical records  [id number]        Your Vitals Were     Pulse Temperature Respirations Height Last Period Pulse Oximetry    62 98.4  F (36.9  C) (Oral) 14 5' 4\" (1.626 m) (LMP Unknown) 98%    BMI (Body Mass Index)                   28.46 kg/m2            Blood Pressure from Last 3 Encounters:   08/24/18 118/64   08/20/18 157/78   08/15/18 143/81    Weight from Last 3 Encounters:   08/24/18 165 lb 12.8 oz (75.2 kg)   08/20/18 165 lb 12.8 oz (75.2 kg)   08/15/18 165 lb 1.6 oz (74.9 kg)              We Performed the Following     XR Pelvis 1/2 Views        Primary Care Provider Office Phone # Fax #    Tre Lockett [phone]       [address]  [facility] [address]        Goals        General    #1 Medication  (pt-stated)     Notes - Note created  8/21/2018 10:37 AM by Behl, Melissa K, RN    Goal Statement: I will take my medications as prescribed.  Measure of Success: Patient and home care will report consistently taking medication as prescribed.  Supportive Steps to Achieve: RN CC notified home care RN of medication error.  Barriers: multiple complex chronic health conditions  Strengths: has home care, care coordination and excellent family support  Date to Achieve By: 9/21/18  Patient expressed understanding of goal: yes         #2 I will complete my health care directive. (pt-stated)     Notes - Note edited  8/21/2018 10:39 AM by Behl, Melissa K, RN    Goal Statement: I will complete my health care directive.  Measure " of Success: Health care directive will be completed and scanned into chart.  Supportive Steps to Achieve: Patient has attended a health care directive class, 10/24/17 informed of CPR vs intubation  Barriers: is awaiting to see her  to finalize document  Strengths: has care coordination, home care and excellent family support  Date to Achieve By: 12/31/18  Patient expressed understanding of goal: yes               Equal Access to Services     [facility] : Hadii rakesh schuster hadyakelin Sojordin, waaxda luqadaha, qaybta kaalmada adenicolasyada, zahraa quezadairmaaddy moss . So [facility] [phone].    ATENCIÓN: Si habla español, tiene a merchant disposición servicios gratuitos de asistencia lingüística. Llame al [phone].    We comply with applicable federal civil rights laws and [place] laws. We do not discriminate on the basis of race, color, national origin, age, disability, sex, sexual orientation, or gender identity.            Thank you!     Thank you for choosing [facility]  for your care. Our goal is always to provide you with excellent care. Hearing back from our patients is one way we can continue to improve our services. Please take a few minutes to complete the written survey that you may receive in the mail after your visit with us. Thank you!             Your Updated Medication List - Protect others around you: Learn how to safely use, store and throw away your medicines at www.disposemymeds.org.          This list is accurate as of 8/24/18 12:35 PM.  Always use your most recent med list.                   Brand Name Dispense Instructions for use Diagnosis    ACETAMINOPHEN PO      Take 1,000 mg by mouth 2 times daily as needed        albuterol (2.5 MG/3ML) 0.083% neb solution      Take 1 vial by nebulization every 6 hours as needed for shortness of breath / dyspnea or wheezing        apixaban ANTICOAGULANT 2.5 MG tablet    ELIQUIS    180 tablet    Take 1 tablet (2.5 mg) by mouth 2  times daily    Atrial flutter, paroxysmal (H)       azaTHIOprine 50 MG tablet    IMURAN    90 tablet    Take 1 tablet (50 mg) by mouth daily    Rheumatoid arthritis involving multiple sites with positive rheumatoid factor (H)       Blood Pressure Monitor Kit     1 kit    1 kit daily as needed    CHF (congestive heart failure) (H)       calcium carbonate 600 mg-vitamin D 400 units 600-400 MG-UNIT per tablet    CALTRATE     Take 1 tablet by mouth 2 times daily        COMPOUNDED NON-CONTROLLED SUBSTANCE - PHARMACY TO MIX COMPOUNDED MEDICATION    CMPD RX    30 g    Estriol 1mg/gram. Place 1 gram vaginally daily for two weeks, then vaginally twice weekly after.    Atrophic vaginitis       fish oil-omega-3 fatty acids 1000 MG capsule      Take 2 g by mouth daily. 2 capsules daily        folic acid 1 MG tablet    FOLVITE    90 tablet    Take 1 tablet (1 mg) by mouth daily    Oral aphthae       GENTEAL MILD OP      Apply  to eye daily.        hydrochlorothiazide 25 MG tablet    HYDRODIURIL    30 tablet    Take 1 tablet (25 mg) by mouth daily    Hypertension goal BP (blood pressure) < 150/90       hydrocortisone 2.5 % cream     30 g    Apply BID to affected region(s) for 7-10 days.    Rash       levothyroxine 75 MCG tablet    SYNTHROID/LEVOTHROID    90 tablet    TAKE ONE TABLET BY MOUTH ONCE DAILY    Hypothyroidism, unspecified type       loratadine 10 MG tablet    CLARITIN     Take 10 mg by mouth every morning        losartan 50 MG tablet    COZAAR    180 tablet    Take 1 tablet (50 mg) by mouth 2 times daily    Heart failure with preserved ejection fraction, NYHA class I (H), Hypertension goal BP (blood pressure) < 130/80       metoprolol succinate 25 MG 24 hr tablet    TOPROL-XL    90 tablet    Take 1 tablet (25 mg) by mouth daily    HTN (hypertension)       nitroGLYcerin 0.4 MG sublingual tablet    NITROSTAT    25 tablet    Place 1 tablet (0.4 mg) under the tongue every 5 minutes as needed for chest pain    Chest pain        * order for DME     1 Box    Equipment being ordered: Dispense face mask. Mrs. Spicer is immunosuppressed due to rheumatoid arthritis.    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 Use with Albuterol.    Hypoxia       order for DME     1 each    Equipment being ordered: Dispense baffle, for use with nebulizer.    Pneumonia of left upper lobe due to Mycoplasma pneumoniae       * order for DME     1 each    Dispense SP Walker-small    Pain of toe of right foot, Status post bunionectomy       PRESERVISION AREDS PO      Take 1 tablet by mouth daily        propafenone 150 MG Tabs tablet    RYTHMOL    90 tablet    Take 1 tablet (150 mg) by mouth 3 times daily    Atrial flutter (H)       ranibizumab 0.3 MG/0.05ML Soln    LUCENTIS     0.3 mg by Intravitreal route Every 6 weeks        ranitidine 150 MG tablet    ZANTAC    60 tablet    Take 1 tablet (150 mg) by mouth 2 times daily    Gastroesophageal reflux disease without esophagitis       REFRESH P.M. Oint      Apply  to eye. Daily at bedtime        saccharomyces boulardii 250 MG capsule    FLORASTOR     Take 250 mg by mouth daily        senna-docusate 8.6-50 MG per tablet    SENOKOT-S;PERICOLACE    120 tablet    Take 2 tablets by mouth At Bedtime Hold if diarrhea occurs.    Constipation, chronic       STATIN NOT PRESCRIBED (INTENTIONAL)      Please choose reason not prescribed, below    Hyperlipidemia LDL goal <100       VITAMIN C PO      Take 500 mg by mouth daily        ZYRTEC ALLERGY 10 MG tablet   Generic drug:  cetirizine      Take 10 mg by mouth At Bedtime        * Notice:  This list has 3 medication(s) that are the same as other medications prescribed for you. Read the directions carefully, and ask your doctor or other care provider to review them with you.

## 2018-08-24 NOTE — TELEPHONE ENCOUNTER
Printed 2 orders in patient's chart from [facility] and faxed to Abel with a note if they are still missing more orders then for [facility] to refax to Dr. Lockett at fax # [phone] to address for patient.  Elmo Jimenez,  For [facility]

## 2018-08-24 NOTE — TELEPHONE ENCOUNTER
Message from MyChart:  Original authorizing provider: MD Linda Michael would like a refill of the following medications:  hydrochlorothiazide (HYDRODIURIL) 25 MG tablet [Tre Lockett MD]    Preferred pharmacy: [facility] [address] [place] [address] - [facility]    Comment:

## 2018-08-24 NOTE — PROGRESS NOTES
SUBJECTIVE:   Linda Spicer is a 87 year old female who presents to clinic today for the following health issues:      Joint Pain    Onset: July    Description:   Location: left hip  Character: Sharp and Dull ache    Intensity: moderate    Progression of Symptoms: intermittent    Accompanying Signs & Symptoms:  Other symptoms: radiation of pain to back and down the leg    History:   Previous similar pain: No    Precipitating factors: Minimally displaced sacral fractures at S2/S3, based on CT pelvis last 7/19/18.  Trauma or overuse: YES- put weight on it and walking  Therapies Tried and outcome: Tylenol, no relief. Patient cannot take NSAIDs due to anticoagulant use.      Problem list and histories reviewed & adjusted, as indicated.  Additional history: as documented    Patient Active Problem List   Diagnosis     ACP (advance care planning)     Hypertension goal BP (blood pressure) < 150/90     Hypothyroidism     Macular degeneration, left eye     Irritable bowel syndrome     Encounter for palliative care     Adjustment disorder with anxious mood     Mild anemia     DDD (degenerative disc disease), lumbar     CKD (chronic kidney disease) stage 3, GFR 30-59 ml/min     History of blood transfusion     Aftercare following surgery     S/P lumbar laminectomy     High risk medication use     Age-related osteoporosis without current pathological fracture     Atrophic vaginitis     Fecal incontinence     Female stress incontinence     Impingement syndrome of both shoulders     UIP (usual interstitial pneumonitis) (H)     High risk medications (not anticoagulants) long-term use     Heart failure with preserved ejection fraction (H)     Congestive heart failure with preserved LV function, NYHA class 3 (H)     Other specified hypothyroidism     Rheumatoid arthritis involving multiple sites with positive rheumatoid factor (H)     Health Care Home     Sick sinus syndrome (H)     Cardiac pacemaker - Medtronic dual lead  pacemaker - Not Dependent - MRI Safe     Immunosuppression (H)     ILD (interstitial lung disease) (H)     Heart failure with preserved ejection fraction, NYHA class I (H)     Atrial flutter (H)     Past Surgical History:   Procedure Laterality Date     APPENDECTOMY       BIOPSY      hemorrhoidectomy     ENT SURGERY      tonsillectomy     GYN SURGERY      3 D & C's     HYSTERECTOMY, PAP NO LONGER INDICATED       LAMINECTOMY LUMBAR ONE LEVEL N/A 10/13/2015    Procedure: LAMINECTOMY LUMBAR ONE LEVEL;  Surgeon: Fransico Toussaint MD;  Location:  OR       Social History   Substance Use Topics     Smoking status: Never Smoker     Smokeless tobacco: Never Used     Alcohol use Yes      Comment: rare wine      Family History   Problem Relation Age of Onset     Hypertension Mother      Psychotic Disorder Father      Diabetes Son      Diabetes Daughter      Blood Disease Daughter          Allergies   Allergen Reactions     Cephalexin Hcl Diarrhea     Gabapentin Other (See Comments)     Dizzsiness     Naproxen GI Disturbance     Perfume      Lactase Other (See Comments)     Macrobid [Nitrofurantoin Anhydrous]      Possibly related to lung disease      Seasonal Allergies      Sulfa Drugs      Throat swelling     Xanax [Alprazolam] Other (See Comments)     Dizziness      Ciprofloxacin Itching and Rash     Recent Labs   Lab Test  08/15/18   0902  08/09/18   0958  05/02/18   1050  04/04/18   1510  02/07/18   1008  01/22/18   1018   12/07/17   1157   08/30/17   1214   06/03/16   0756   LDL   --   87   --    --    --    --    --    --    --   76   --   109*   HDL   --   59   --    --    --    --    --    --    --   50   --   47*   TRIG   --   51   --    --    --    --    --    --    --   101   --   75   ALT  18   --   28   --   35   --    --    --    < >   --    < >  36   CR  1.27*  1.38*  1.36*   --   1.43*  1.26*   < >  1.26*   < >   --    < >  1.44*   GFRESTIMATED  40*  36*  37*   --   35*  40*   < >  40*   < >   --    < >   "35*   GFRESTBLACK  48*  44*  45*   --   42*  49*   < >  49*   < >   --    < >  42*   POTASSIUM   --   5.5*   --    --    --   4.3   < >   --    < >   --    < >  5.1   TSH   --    --    --   0.53   --    --    --   0.89   --   0.77   < >   --     < > = values in this interval not displayed.      BP Readings from Last 3 Encounters:   08/30/18 166/64   08/24/18 118/64   08/20/18 157/78    Wt Readings from Last 3 Encounters:   08/24/18 165 lb 12.8 oz (75.2 kg)   08/20/18 165 lb 12.8 oz (75.2 kg)   08/15/18 165 lb 1.6 oz (74.9 kg)                 ROS:  CONSTITUTIONAL: NEGATIVE for fever, chills, change in weight  INTEGUMENTARY/SKIN: NEGATIVE for worrisome rashes, moles or lesions  EYES: NEGATIVE for vision changes or irritation  ENT/MOUTH: NEGATIVE for ear, mouth and throat problems  RESP: NEGATIVE for significant cough or SOB  CV: NEGATIVE for chest pain, palpitations or peripheral edema  GI: NEGATIVE for nausea, abdominal pain, heartburn, or change in bowel habits  : NEGATIVE for frequency, dysuria, or hematuria  MUSCULOSKELETAL:As above.  NEURO: NEGATIVE for weakness, dizziness or paresthesias  ENDOCRINE: NEGATIVE for temperature intolerance, skin/hair changes  HEME: NEGATIVE for bleeding problems  PSYCHIATRIC: NEGATIVE for changes in mood or affect    OBJECTIVE:     /64  Pulse 62  Temp 98.4  F (36.9  C) (Oral)  Resp 14  Ht 5' 4\" (1.626 m)  Wt 165 lb 12.8 oz (75.2 kg)  LMP  (LMP Unknown)  SpO2 98%  BMI 28.46 kg/m2  Body mass index is 28.46 kg/(m^2).  GENERAL: healthy, alert and no distress  EYES: Eyes grossly normal to inspection and conjunctivae and sclerae normal  HENT: normal cephalic/atraumatic and oral mucous membranes moist  RESP: lungs clear to auscultation - no rales, rhonchi or wheezes  CV: regular rate and rhythm, normal S1 S2, no S3 or S4, no murmur, click or rub, no peripheral edema and peripheral pulses strong  ABDOMEN: soft, nontender, no hepatosplenomegaly, no masses and bowel sounds " normal  MS: Tenderness on palpation of left pubic area, corresponding to left ischiogluteal bursa.  SKIN: no suspicious lesions or rashes  NEURO: Normal strength and tone, mentation intact and speech normal  PSYCH: mentation appears normal, affect normal/bright    Diagnostic Test Results:  Results for orders placed or performed in visit on 08/15/18   CBC with platelets differential   Result Value Ref Range    WBC 5.7 4.0 - 11.0 10e9/L    RBC Count 3.19 (L) 3.8 - 5.2 10e12/L    Hemoglobin 10.6 (L) 11.7 - 15.7 g/dL    Hematocrit 31.4 (L) 35.0 - 47.0 %    MCV 98 78 - 100 fl    MCH 33.2 (H) 26.5 - 33.0 pg    MCHC 33.8 31.5 - 36.5 g/dL    RDW 12.9 10.0 - 15.0 %    Platelet Count 254 150 - 450 10e9/L    Diff Method Automated Method     % Neutrophils 60.6 %    % Lymphocytes 25.0 %    % Monocytes 11.4 %    % Eosinophils 1.8 %    % Basophils 0.5 %    % Immature Granulocytes 0.7 %    Absolute Neutrophil 3.4 1.6 - 8.3 10e9/L    Absolute Lymphocytes 1.4 0.8 - 5.3 10e9/L    Absolute Monocytes 0.7 0.0 - 1.3 10e9/L    Absolute Eosinophils 0.1 0.0 - 0.7 10e9/L    Absolute Basophils 0.0 0.0 - 0.2 10e9/L    Abs Immature Granulocytes 0.0 0 - 0.4 10e9/L   Creatinine   Result Value Ref Range    Creatinine 1.27 (H) 0.52 - 1.04 mg/dL    GFR Estimate 40 (L) >60 mL/min/1.7m2    GFR Estimate If Black 48 (L) >60 mL/min/1.7m2   Hepatic panel   Result Value Ref Range    Bilirubin Direct 0.1 0.0 - 0.2 mg/dL    Bilirubin Total 0.5 0.2 - 1.3 mg/dL    Albumin 3.4 3.4 - 5.0 g/dL    Protein Total 7.4 6.8 - 8.8 g/dL    Alkaline Phosphatase 111 40 - 150 U/L    ALT 18 0 - 50 U/L    AST 18 0 - 45 U/L       ASSESSMENT/PLAN:     (R10.2) Pelvic pain in female  (primary encounter diagnosis)  Comment: Most likely due to sacral fractures but still obtain limited bone scan to rule out other possible causes.  Plan: XR Pelvis 1/2 Views, ORTHOPEDICS ADULT         REFERRAL, CANCELED: NM Bone &/Or Joint Scan         Limited            (M70.72) Ischiogluteal  bursitis, left, suspected  Comment: another probable cause of pelvic pain, evident from tenderness on palpation of left pubic bone.  Plan: ORTHOPEDICS ADULT REFERRAL            Follow-up visit if condition worsens.    Tre Lockett MD  [facility]

## 2018-08-28 ENCOUNTER — PATIENT OUTREACH (OUTPATIENT)
Dept: CARE COORDINATION | Facility: CLINIC | Age: 83
End: 2018-08-28

## 2018-08-28 ENCOUNTER — TELEPHONE (OUTPATIENT)
Dept: FAMILY MEDICINE | Facility: CLINIC | Age: 83
End: 2018-08-28

## 2018-08-28 NOTE — TELEPHONE ENCOUNTER
This writer attempted to contact Filomena RN on 08/28/18      Reason for call home care orders and left detailed message with verbal okay.      If patient calls back:   Patient contacted by 1st floor [place] Care Team (MA/TC). Inform patient that someone from the team will contact them, document that pt called and route to care team.         Usama Ching

## 2018-08-28 NOTE — PROGRESS NOTES
Clinic Care Coordination Contact  [facility]/Voicemail    Referral Source: Self-patient/Caregiver  Clinical Data: Care Coordinator Outreach  Outreach attempted x 1.  Left message with spouse requesting call back.  Plan: Care Coordinator mailed out care coordination introduction letter on 4/6/16. Care Coordinator will try to reach patient again in 5-10 business days.    Melissa Behl BSN, RN, N  [facility] Care Coordinator  [phone]

## 2018-08-28 NOTE — TELEPHONE ENCOUNTER
Reason for Call:  Home Health Care    Skilled Nursing: re-certifcation for 5 1x week skilled nursing, 1 PRN's: for BP monitoring, anxiety and medication monitoring     Pt Provider: Vocal    Phone Number Homecare Nurse Filomena can be reached at: [phone]    Can we leave a detailed message on this number? YES    Best Time: any    Call taken on 8/28/2018 at 9:47 AM by Maribel Crockett

## 2018-08-30 ENCOUNTER — HOSPITAL ENCOUNTER (OUTPATIENT)
Dept: NUCLEAR MEDICINE | Facility: CLINIC | Age: 83
Setting detail: NUCLEAR MEDICINE
Discharge: HOME | End: 2018-08-30
Attending: INTERNAL MEDICINE | Admitting: INTERNAL MEDICINE
Payer: MEDICARE

## 2018-08-30 ENCOUNTER — TELEPHONE (OUTPATIENT)
Dept: FAMILY MEDICINE | Facility: CLINIC | Age: 83
End: 2018-08-30

## 2018-08-30 ENCOUNTER — OFFICE VISIT (OUTPATIENT)
Dept: ORTHOPEDICS | Facility: CLINIC | Age: 83
End: 2018-08-30
Payer: MEDICARE

## 2018-08-30 ENCOUNTER — HOSPITAL ENCOUNTER (OUTPATIENT)
Dept: NUCLEAR MEDICINE | Facility: CLINIC | Age: 83
Setting detail: NUCLEAR MEDICINE
End: 2018-08-30
Attending: INTERNAL MEDICINE
Payer: MEDICARE

## 2018-08-30 VITALS — DIASTOLIC BLOOD PRESSURE: 64 MMHG | SYSTOLIC BLOOD PRESSURE: 166 MMHG | HEART RATE: 61 BPM | OXYGEN SATURATION: 97 %

## 2018-08-30 DIAGNOSIS — M25.552 LEFT HIP PAIN: Primary | ICD-10-CM

## 2018-08-30 DIAGNOSIS — Z53.9 DIAGNOSIS NOT YET DEFINED: Primary | ICD-10-CM

## 2018-08-30 DIAGNOSIS — R10.2 PELVIC PAIN IN FEMALE: ICD-10-CM

## 2018-08-30 PROCEDURE — G0179 MD RECERTIFICATION HHA PT: HCPCS | Performed by: INTERNAL MEDICINE

## 2018-08-30 PROCEDURE — 34300033 ZZH RX 343: Performed by: INTERNAL MEDICINE

## 2018-08-30 PROCEDURE — 78306 BONE IMAGING WHOLE BODY: CPT

## 2018-08-30 PROCEDURE — 20611 DRAIN/INJ JOINT/BURSA W/US: CPT | Mod: LT | Performed by: FAMILY MEDICINE

## 2018-08-30 PROCEDURE — 99213 OFFICE O/P EST LOW 20 MIN: CPT | Mod: 25 | Performed by: FAMILY MEDICINE

## 2018-08-30 PROCEDURE — A9503 TC99M MEDRONATE: HCPCS | Performed by: INTERNAL MEDICINE

## 2018-08-30 RX ORDER — TC 99M MEDRONATE 20 MG/10ML
20-30 INJECTION, POWDER, LYOPHILIZED, FOR SOLUTION INTRAVENOUS ONCE
Status: COMPLETED | OUTPATIENT
Start: 2018-08-30 | End: 2018-08-30

## 2018-08-30 RX ADMIN — TC 99M MEDRONATE 26.4 MCI.: 20 INJECTION, POWDER, LYOPHILIZED, FOR SOLUTION INTRAVENOUS at 10:58

## 2018-08-30 ASSESSMENT — PAIN SCALES - GENERAL: PAINLEVEL: SEVERE PAIN (6)

## 2018-08-30 NOTE — MR AVS SNAPSHOT
After Visit Summary   2018    Linda Spicer    MRN: [id number]           Patient Information     Date Of Birth          1931        Visit Information        Provider Department      2018 4:20 PM Joey Pina DO [facility]        Care Instructions    Thanks for coming today.  Ortho/Sports Medicine Clinic  [address] [place] [address]    To schedule future appointments in Ortho Clinic, you may call [phone].    To schedule ordered imaging by your provider:   Call Central Imaging [phone]    To schedule an injection ordered by your provider:  Call Central Imaging Injection scheduling line: [phone]  SimpliVThart available online at:  [email or web address]    Please call if any further questions or concerns ([phone]).  Clinic hours 8 am to 5 pm.    Return to clinic (call) if symptoms worsen or fail to improve.            Follow-ups after your visit        Your next 10 appointments already scheduled     Sep 05, 2018 10:00 AM CDT   Return Visit with DEVICE CHECK RN CARD   [facility])    [address] [place] [address]   [phone]            Sep 11, 2018 10:00 AM CDT   Return Visit with Mario Davenport MD   [facility] ([facility])    [address] [place] [address]   [phone]            Sep 13, 2018  9:30 AM CDT   Level 1 with BAY 8 INFUSION   [facility])    [address] [place] [address]   [phone]            Oct 11, 2018  9:00 AM CDT   Level 1 with BAY 7 INFUSION   [facility])    [address] [place] [address]   [phone]            Dec 11, 2018 10:00 AM CST   Office Visit with PFT LAB   [facility])    [address] [place] [address]  [facility] [address]   [phone]           Bring a current list of meds and any records pertaining to this visit. For Physicals, please bring immunization records and any forms needing to be filled out. Please arrive 10 minutes early to complete paperwork.            Dec 11, 2018 11:00 AM CST   [facility] Visit with Dea Acosta MD   [facility]    [address] [facility] [address] [phone]            Feb 18, 2019  2:10 PM CST   Return Visit with Emeterio Loza MD   [facility])    [id number] [facility] [address] [phone]            Mar 01, 2019 10:00 AM CST   Return Visit with Asia Steven PA-C   [facility] ([facility])    [id number] [facility] [address] [phone]              Future tests that were ordered for you today     Open Future Orders        Priority Expected Expires Ordered    [place] Bone Scan Whole Body Routine  8/24/2019 8/24/2018            Who to contact     If you have questions or need follow up information about today's clinic visit or your schedule please contact [facility] directly at [phone].  Normal or non-critical lab and imaging results will be communicated to you by MyChart, letter or phone within 4 business days after the clinic has received the results. If you do not hear from us within 7 days, please contact the clinic through Huolihart or phone. If you have a critical or abnormal lab result, we will notify you by phone as soon as possible.  Submit refill requests through [employer] or call your pharmacy and they will forward the refill request to us. Please allow 3 business days for your refill to be completed.          Additional Information About Your Visit        HuoliharSPHARES Information     [employer] gives you secure access to your electronic health record. If you  see a primary care provider, you can also send messages to your care team and make appointments. If you have questions, please call your primary care clinic.  If you do not have a primary care provider, please call [phone] and they will assist you.      [employer] is an electronic gateway that provides easy, online access to your medical records. With [employer], you can request a clinic appointment, read your test results, renew a prescription or communicate with your care team.     To access your existing account, please contact your [facility] or call [phone] for assistance.        Care EveryWhere ID     This is your Care EveryWhere ID. This could be used by other organizations to access your [facility] medical records  [id number]        Your Vitals Were     Pulse Last Period Pulse Oximetry             61 (LMP Unknown) 97%          Blood Pressure from Last 3 Encounters:   08/30/18 166/64   08/24/18 118/64   08/20/18 157/78    Weight from Last 3 Encounters:   08/24/18 75.2 kg (165 lb 12.8 oz)   08/20/18 75.2 kg (165 lb 12.8 oz)   08/15/18 74.9 kg (165 lb 1.6 oz)              Today, you had the following     No orders found for display       Primary Care Provider Office Phone # Fax #    Tre Lockett [phone]       [address]  [facility] [address]        Goals        General    #1 Medication  (pt-stated)     Notes - Note created  8/21/2018 10:37 AM by Behl, Melissa K, RN    Goal Statement: I will take my medications as prescribed.  Measure of Success: Patient and home care will report consistently taking medication as prescribed.  Supportive Steps to Achieve: RN CC notified home care RN of medication error.  Barriers: multiple complex chronic health conditions  Strengths: has home care, care coordination and excellent family support  Date to Achieve By: 9/21/18  Patient expressed understanding of goal: yes         #2 I will complete my health  care directive. (pt-stated)     Notes - Note edited  8/21/2018 10:39 AM by Behl, Melissa K, RN    Goal Statement: I will complete my health care directive.  Measure of Success: Health care directive will be completed and scanned into chart.  Supportive Steps to Achieve: Patient has attended a health care directive class, 10/24/17 informed of CPR vs intubation  Barriers: is awaiting to see her  to finalize document  Strengths: has care coordination, home care and excellent family support  Date to Achieve By: 12/31/18  Patient expressed understanding of goal: yes               Equal Access to Services     [facility]: Hadii aad mariely hadasho Soomaali, waaxda luqadaha, qaybta kaalmada adeegyafidencio, zahraa moss . So [facility] [phone].    ATENCIÓN: Si habla español, tiene a merchant disposición servicios gratuitos de asistencia lingüística. Llame al [phone].    We comply with applicable federal civil rights laws and [place] laws. We do not discriminate on the basis of race, color, national origin, age, disability, sex, sexual orientation, or gender identity.            Thank you!     Thank you for choosing [facility]  for your care. Our goal is always to provide you with excellent care. Hearing back from our patients is one way we can continue to improve our services. Please take a few minutes to complete the written survey that you may receive in the mail after your visit with us. Thank you!             Your Updated Medication List - Protect others around you: Learn how to safely use, store and throw away your medicines at www.disposemymeds.org.          This list is accurate as of 8/30/18  4:51 PM.  Always use your most recent med list.                   Brand Name Dispense Instructions for use Diagnosis    ACETAMINOPHEN PO      Take 1,000 mg by mouth 2 times daily as needed        albuterol (2.5 MG/3ML) 0.083% neb solution      Take 1 vial by nebulization every 6 hours  as needed for shortness of breath / dyspnea or wheezing        apixaban ANTICOAGULANT 2.5 MG tablet    ELIQUIS    180 tablet    Take 1 tablet (2.5 mg) by mouth 2 times daily    Atrial flutter, paroxysmal (H)       azaTHIOprine 50 MG tablet    IMURAN    90 tablet    Take 1 tablet (50 mg) by mouth daily    Rheumatoid arthritis involving multiple sites with positive rheumatoid factor (H)       Blood Pressure Monitor Kit     1 kit    1 kit daily as needed    CHF (congestive heart failure) (H)       calcium carbonate 600 mg-vitamin D 400 units 600-400 MG-UNIT per tablet    CALTRATE     Take 1 tablet by mouth 2 times daily        COMPOUNDED NON-CONTROLLED SUBSTANCE - PHARMACY TO MIX COMPOUNDED MEDICATION    CMPD RX    30 g    Estriol 1mg/gram. Place 1 gram vaginally daily for two weeks, then vaginally twice weekly after.    Atrophic vaginitis       fish oil-omega-3 fatty acids 1000 MG capsule      Take 2 g by mouth daily. 2 capsules daily        folic acid 1 MG tablet    FOLVITE    90 tablet    Take 1 tablet (1 mg) by mouth daily    Oral aphthae       GENTEAL MILD OP      Apply  to eye daily.        hydrochlorothiazide 25 MG tablet    HYDRODIURIL    30 tablet    Take 1 tablet (25 mg) by mouth daily    Hypertension goal BP (blood pressure) < 150/90       hydrocortisone 2.5 % cream     30 g    Apply BID to affected region(s) for 7-10 days.    Rash       levothyroxine 75 MCG tablet    SYNTHROID/LEVOTHROID    90 tablet    TAKE ONE TABLET BY MOUTH ONCE DAILY    Hypothyroidism, unspecified type       loratadine 10 MG tablet    CLARITIN     Take 10 mg by mouth every morning        losartan 50 MG tablet    COZAAR    180 tablet    Take 1 tablet (50 mg) by mouth 2 times daily    Heart failure with preserved ejection fraction, NYHA class I (H), Hypertension goal BP (blood pressure) < 130/80       metoprolol succinate 25 MG 24 hr tablet    TOPROL-XL    90 tablet    Take 1 tablet (25 mg) by mouth daily    HTN (hypertension)        nitroGLYcerin 0.4 MG sublingual tablet    NITROSTAT    25 tablet    Place 1 tablet (0.4 mg) under the tongue every 5 minutes as needed for chest pain    Chest pain       * order for DME     1 Box    Equipment being ordered: Dispense face mask. Mrs. Spicer is immunosuppressed due to rheumatoid arthritis.    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 Use with Albuterol.    Hypoxia       order for DME     1 each    Equipment being ordered: Dispense baffle, for use with nebulizer.    Pneumonia of left upper lobe due to Mycoplasma pneumoniae       * order for DME     1 each    Dispense SP Walker-small    Pain of toe of right foot, Status post bunionectomy       PRESERVISION AREDS PO      Take 1 tablet by mouth daily        propafenone 150 MG Tabs tablet    RYTHMOL    90 tablet    Take 1 tablet (150 mg) by mouth 3 times daily    Atrial flutter (H)       ranibizumab 0.3 MG/0.05ML Soln    LUCENTIS     0.3 mg by Intravitreal route Every 6 weeks        ranitidine 150 MG tablet    ZANTAC    60 tablet    Take 1 tablet (150 mg) by mouth 2 times daily    Gastroesophageal reflux disease without esophagitis       REFRESH P.M. Oint      Apply  to eye. Daily at bedtime        saccharomyces boulardii 250 MG capsule    FLORASTOR     Take 250 mg by mouth daily        senna-docusate 8.6-50 MG per tablet    SENOKOT-S;PERICOLACE    120 tablet    Take 2 tablets by mouth At Bedtime Hold if diarrhea occurs.    Constipation, chronic       STATIN NOT PRESCRIBED (INTENTIONAL)      Please choose reason not prescribed, below    Hyperlipidemia LDL goal <100       VITAMIN C PO      Take 500 mg by mouth daily        ZYRTEC ALLERGY 10 MG tablet   Generic drug:  cetirizine      Take 10 mg by mouth At Bedtime        * Notice:  This list has 3 medication(s) that are the same as other medications prescribed for you. Read the directions carefully, and  ask your doctor or other care provider to review them with you.

## 2018-08-30 NOTE — TELEPHONE ENCOUNTER
This writer attempted to contact patient on 08/30/18      Reason for call PT verbal orders and left detailed message.      If patient calls back:   Patient contacted by a Registered Nurse. Inform patient that someone from the RN group will contact them, document that pt called and route to P DYAD 3 RN POOL [[id number]]        Betsey Mendoza RN

## 2018-08-30 NOTE — TELEPHONE ENCOUNTER
returned call    Best number to reach caller:   RAINA (Provider) [phone]     Is it ok to leave a detailed message: YES

## 2018-08-30 NOTE — TELEPHONE ENCOUNTER
...Reason for Call:  Other     Detailed comments: [facility] requesting orders for physical therapy recertification 1 time a week for 4 weeks need verbal    Phone Number Patient can be reached at: [phone]    Best Time: anytime    Can we leave a detailed message on this number? YES    Call taken on 8/30/2018 at 9:19 AM by Leonarda Gonzalez

## 2018-08-30 NOTE — NURSING NOTE
Linda Spicer's chief complaint for this visit includes:  Chief Complaint   Patient presents with     Consult     left hip pain. Pt is requesting injection.      PCP: Tre Lockett    Referring Provider:  Tre Lockett MD  [address] [place] [address]    /64  Pulse 61  LMP  (LMP Unknown)  SpO2 97%  Severe Pain (6)     Do you need any medication refills at today's visit? No

## 2018-08-30 NOTE — LETTER
8/30/2018         RE: Linda Spicer  [address]  [facility] [address]        Dear Colleague,    Thank you for referring your patient, Linda Spicer, to the [facility]. Please see a copy of my visit note below.    CHIEF COMPLAINT:  Consult (left hip pain. Pt is requesting injection. )       HISTORY OF PRESENT ILLNESS  Ms. Spicer is a pleasant 87 year old year old female who presents to clinic today with hip pain.  Linda is seen at the request of Dr. Lockett.    Just over a week ago Linda suffered a misstep and felt an immediate pull in her lateral and posterior hip.  She has known osteoarthritis of the hip, this feels quite different to her.  She feels pain with each step, more so when she arises from a seated position.  She is very tender to the touch and her pain is quite debilitating.  She would like to discuss a corticosteroid injection for some relief.    Additional history: as documented    MEDICAL HISTORY  Patient Active Problem List   Diagnosis     ACP (advance care planning)     Hypertension goal BP (blood pressure) < 150/90     Hypothyroidism     Macular degeneration, left eye     Irritable bowel syndrome     Encounter for palliative care     Adjustment disorder with anxious mood     Mild anemia     DDD (degenerative disc disease), lumbar     CKD (chronic kidney disease) stage 3, GFR 30-59 ml/min     History of blood transfusion     Aftercare following surgery     S/P lumbar laminectomy     High risk medication use     Age-related osteoporosis without current pathological fracture     Atrophic vaginitis     Fecal incontinence     Female stress incontinence     Impingement syndrome of both shoulders     UIP (usual interstitial pneumonitis) (H)     High risk medications (not anticoagulants) long-term use     Heart failure with preserved ejection fraction (H)     Congestive heart failure with preserved LV function, NYHA class 3 (H)     Other specified  hypothyroidism     Rheumatoid arthritis involving multiple sites with positive rheumatoid factor (H)     Health Care Home     Sick sinus syndrome (H)     Cardiac pacemaker - Medtronic dual lead pacemaker - Not Dependent - MRI Safe     Immunosuppression (H)     ILD (interstitial lung disease) (H)     Heart failure with preserved ejection fraction, NYHA class I (H)     Atrial flutter (H)       Current Outpatient Prescriptions   Medication Sig Dispense Refill     ACETAMINOPHEN PO Take 1,000 mg by mouth 2 times daily as needed        albuterol (2.5 MG/3ML) 0.083% neb solution Take 1 vial by nebulization every 6 hours as needed for shortness of breath / dyspnea or wheezing       apixaban ANTICOAGULANT (ELIQUIS) 2.5 MG tablet Take 1 tablet (2.5 mg) by mouth 2 times daily 180 tablet 3     Artificial Tear Ointment (REFRESH P.M.) OINT Apply  to eye. Daily at bedtime          Ascorbic Acid (VITAMIN C PO) Take 500 mg by mouth daily        azaTHIOprine (IMURAN) 50 MG tablet Take 1 tablet (50 mg) by mouth daily 90 tablet 2     Blood Pressure Monitor KIT 1 kit daily as needed 1 kit 0     calcium-vitamin D (CALTRATE) 600-400 MG-UNIT per tablet Take 1 tablet by mouth 2 times daily        cetirizine (ZYRTEC ALLERGY) 10 MG tablet Take 10 mg by mouth At Bedtime       COMPOUNDED NON-CONTROLLED SUBSTANCE (CMPD RX) - PHARMACY TO MIX COMPOUNDED MEDICATION Estriol 1mg/gram. Place 1 gram vaginally daily for two weeks, then vaginally twice weekly after. 30 g 6     fish oil-omega-3 fatty acids (FISH OIL) 1000 MG capsule Take 2 g by mouth daily. 2 capsules daily            folic acid (FOLVITE) 1 MG tablet Take 1 tablet (1 mg) by mouth daily 90 tablet 3     hydrochlorothiazide (HYDRODIURIL) 25 MG tablet Take 1 tablet (25 mg) by mouth daily 30 tablet 5     hydrocortisone 2.5 % cream Apply BID to affected region(s) for 7-10 days. 30 g 0     Hypromellose (GENTEAL MILD OP) Apply  to eye daily.       levothyroxine (SYNTHROID/LEVOTHROID) 75 MCG  tablet TAKE ONE TABLET BY MOUTH ONCE DAILY 90 tablet 1     loratadine (CLARITIN) 10 MG tablet Take 10 mg by mouth every morning       losartan (COZAAR) 50 MG tablet Take 1 tablet (50 mg) by mouth 2 times daily 180 tablet 1     metoprolol succinate (TOPROL-XL) 25 MG 24 hr tablet Take 1 tablet (25 mg) by mouth daily 90 tablet 3     Multiple Vitamins-Minerals (PRESERVISION AREDS PO) Take 1 tablet by mouth daily        nitroglycerin (NITROSTAT) 0.4 MG SL tablet Place 1 tablet (0.4 mg) under the tongue every 5 minutes as needed for chest pain 25 tablet 0     order for DME Dispense SP Walker-small 1 each 0     order for DME Equipment being ordered: Dispense baffle, for use with nebulizer. 1 each 0     order for DME Equipment being ordered: Nebulizer. Use with Albuterol. 1 each 0     order for DME Equipment being ordered: Dispense face mask.  Mrs. Spicer is immunosuppressed due to rheumatoid arthritis. 1 [address]     propafenone (RYTHMOL) 150 MG TABS tablet Take 1 tablet (150 mg) by mouth 3 times daily 90 tablet 3     ranibizumab (LUCENTIS) 0.3 MG/0.05ML SOLN 0.3 mg by Intravitreal route Every 6 weeks       ranitidine (ZANTAC) 150 MG tablet Take 1 tablet (150 mg) by mouth 2 times daily 60 tablet 5     saccharomyces boulardii (FLORASTOR) 250 MG capsule Take 250 mg by mouth daily       senna-docusate (SENOKOT-S;PERICOLACE) 8.6-50 MG per tablet Take 2 tablets by mouth At Bedtime Hold if diarrhea occurs. 120 tablet 11     STATIN NOT PRESCRIBED, INTENTIONAL, Please choose reason not prescribed, below         Allergies   Allergen Reactions     Cephalexin Hcl Diarrhea     Gabapentin Other (See Comments)     Dizzsiness     Naproxen GI Disturbance     Perfume      Lactase Other (See Comments)     Macrobid [Nitrofurantoin Anhydrous]      Possibly related to lung disease      Seasonal Allergies      Sulfa Drugs      Throat swelling     Xanax [Alprazolam] Other (See Comments)     Dizziness      Ciprofloxacin Itching and Rash       Family  History   Problem Relation Age of Onset     Hypertension Mother      Psychotic Disorder Father      Diabetes Son      Diabetes Daughter      Blood Disease Daughter        Additional medical/Social/Surgical histories reviewed in [facility] and updated as appropriate.     REVIEW OF SYSTEMS (8/31/2018)  CONSTITUTIONAL: Denies fever and weight loss  EYES: Denies acute vision changes  ENT: Denies hearing changes or difficulty swallowing  CARDIAC: Denies chest pain or edema  RESPIRATORY: Denies dyspnea, cough or wheeze  GASTROINTESTINAL: Denies abdominal pain, nausea, vomiting  MUSCULOSKELETAL: See HPI  SKIN: Denies any recent rash or lesion  NEUROLOGICAL: Denies numbness or focal weakness  PSYCHIATRIC: No history of psychiatric symptoms or problems  ENDOCRINE: Denies current diagnosis of diabetes  HEMATOLOGY: Denies episodes of easy bleeding      PHYSICAL EXAM  Vitals:    08/30/18 1620   BP: 166/64   Pulse: 61   SpO2: 97%     General  - normal appearance, in no obvious distress  CV  - normal femoral pulse  Pulm  - normal respiratory pattern, non-labored  Musculoskeletal - left hip  - stance: in wheelchair, needs assistance with transfers  - inspection: no swelling or effusion,  normal bone and joint alignment, no obvious deformity  - palpation: tender over the greater trochanter and gluteal musculature to SI joint  - ROM: pain with active abduction, normal and painless flexion, extension, IR, ER, adduction  Neuro  - no sensory or motor deficit, grossly normal coordination, normal muscle tone  Skin  - no ecchymosis, erythema, warmth, or induration, no obvious rash  Psych  - interactive, appropriate, normal mood and affect           ASSESSMENT & PLAN  Ms. Spicer is a 87 year old year old female who presents to clinic today with left hip pain.    Linda's pain is not quite classic for trochanteric bursitis or enthesopathy, although it does have some features.  We had a long discussion about this.  Her pain is suggestive of a  possible small gluteal muscle tear or SI joint pathology.  I did perform a lateral hip injection today (see procedure note) on a diagnostic and therapeutic basis.    Linda is going to let me know how she is doing in a week, if this injection was not effective I will likely refer her for a fluoroscopic guided SI injection.    Thank you for allowing me to participate in Linda's care.    Trochanteric Hip Injection - Ultrasound Guided  The patient was informed of the risks and the benefits of the procedure and a written consent was signed.  The patient s left lateral hip was prepped with chlorhexidine in sterile fashion.   40 mg of triamcinolone suspension was drawn up into a 5 mL syringe with 2 mL of 1% lidocaine and 2 mL of 0.5% marcaine.  Injection was performed using sterile technique.  Under ultrasound guidance a 3.5-inch 25-gauge needle was used to enter the left josé luis-trochanteric tissue.  Needle placement was visualized and documented with ultrasound which was deemed necessary to ensure medication did not enter the tendon itself, which could potentially cause tendon damage.   Injection performed long axis to the probe with probe in short axis to the greater trochanter.  Expansion of the josé luis-trochanteric tissue was visualized under ultrasound upon injection.  Images were permanently stored for the patient's record.  There were no complications. The patient tolerated the procedure well. There was negligible bleeding.   The patient was instructed to ice the hip upon leaving clinic and refrain from overuse over the next 3 days.   The patient was instructed to call or go to the emergency room with any unusual pain, swelling, redness, or if otherwise concerned.  Kenalog: NDC [id number]          Joey Pina DO, [facility]  Primary Care Sports Medicine           Again, thank you for allowing me to participate in the care of your patient.        Sincerely,        Joey Pina DO

## 2018-08-30 NOTE — PATIENT INSTRUCTIONS
Thanks for coming today.  Ortho/Sports Medicine Clinic  [address] [place] [address]    To schedule future appointments in Ortho Clinic, you may call [phone].    To schedule ordered imaging by your provider:   Call Central Imaging [phone]    To schedule an injection ordered by your provider:  Call Central Imaging Injection scheduling line: [phone]  410 Labshart available online at:  [email or web address]    Please call if any further questions or concerns ([phone]).  Clinic hours 8 am to 5 pm.    Return to clinic (call) if symptoms worsen or fail to improve.

## 2018-08-31 RX ORDER — TRIAMCINOLONE ACETONIDE 40 MG/ML
40 INJECTION, SUSPENSION INTRA-ARTICULAR; INTRAMUSCULAR ONCE
Qty: 1 ML | Refills: 0 | OUTPATIENT
Start: 2018-08-31 | End: 2019-02-07

## 2018-08-31 NOTE — PROGRESS NOTES
CHIEF COMPLAINT:  Consult (left hip pain. Pt is requesting injection. )       HISTORY OF PRESENT ILLNESS  Ms. Spicer is a pleasant 87 year old year old female who presents to clinic today with hip pain.  Linda is seen at the request of Dr. Lockett.    Just over a week ago Linda suffered a misstep and felt an immediate pull in her lateral and posterior hip.  She has known osteoarthritis of the hip, this feels quite different to her.  She feels pain with each step, more so when she arises from a seated position.  She is very tender to the touch and her pain is quite debilitating.  She would like to discuss a corticosteroid injection for some relief.    Additional history: as documented    MEDICAL HISTORY  Patient Active Problem List   Diagnosis     ACP (advance care planning)     Hypertension goal BP (blood pressure) < 150/90     Hypothyroidism     Macular degeneration, left eye     Irritable bowel syndrome     Encounter for palliative care     Adjustment disorder with anxious mood     Mild anemia     DDD (degenerative disc disease), lumbar     CKD (chronic kidney disease) stage 3, GFR 30-59 ml/min     History of blood transfusion     Aftercare following surgery     S/P lumbar laminectomy     High risk medication use     Age-related osteoporosis without current pathological fracture     Atrophic vaginitis     Fecal incontinence     Female stress incontinence     Impingement syndrome of both shoulders     UIP (usual interstitial pneumonitis) (H)     High risk medications (not anticoagulants) long-term use     Heart failure with preserved ejection fraction (H)     Congestive heart failure with preserved LV function, NYHA class 3 (H)     Other specified hypothyroidism     Rheumatoid arthritis involving multiple sites with positive rheumatoid factor (H)     Health Care Home     Sick sinus syndrome (H)     Cardiac pacemaker - Medtronic dual lead pacemaker - Not Dependent - MRI Safe     Immunosuppression (H)     ILD  (interstitial lung disease) (H)     Heart failure with preserved ejection fraction, NYHA class I (H)     Atrial flutter (H)       Current Outpatient Prescriptions   Medication Sig Dispense Refill     ACETAMINOPHEN PO Take 1,000 mg by mouth 2 times daily as needed        albuterol (2.5 MG/3ML) 0.083% neb solution Take 1 vial by nebulization every 6 hours as needed for shortness of breath / dyspnea or wheezing       apixaban ANTICOAGULANT (ELIQUIS) 2.5 MG tablet Take 1 tablet (2.5 mg) by mouth 2 times daily 180 tablet 3     Artificial Tear Ointment (REFRESH P.M.) OINT Apply  to eye. Daily at bedtime          Ascorbic Acid (VITAMIN C PO) Take 500 mg by mouth daily        azaTHIOprine (IMURAN) 50 MG tablet Take 1 tablet (50 mg) by mouth daily 90 tablet 2     Blood Pressure Monitor KIT 1 kit daily as needed 1 kit 0     calcium-vitamin D (CALTRATE) 600-400 MG-UNIT per tablet Take 1 tablet by mouth 2 times daily        cetirizine (ZYRTEC ALLERGY) 10 MG tablet Take 10 mg by mouth At Bedtime       COMPOUNDED NON-CONTROLLED SUBSTANCE (CMPD RX) - PHARMACY TO MIX COMPOUNDED MEDICATION Estriol 1mg/gram. Place 1 gram vaginally daily for two weeks, then vaginally twice weekly after. 30 g 6     fish oil-omega-3 fatty acids (FISH OIL) 1000 MG capsule Take 2 g by mouth daily. 2 capsules daily            folic acid (FOLVITE) 1 MG tablet Take 1 tablet (1 mg) by mouth daily 90 tablet 3     hydrochlorothiazide (HYDRODIURIL) 25 MG tablet Take 1 tablet (25 mg) by mouth daily 30 tablet 5     hydrocortisone 2.5 % cream Apply BID to affected region(s) for 7-10 days. 30 g 0     Hypromellose (GENTEAL MILD OP) Apply  to eye daily.       levothyroxine (SYNTHROID/LEVOTHROID) 75 MCG tablet TAKE ONE TABLET BY MOUTH ONCE DAILY 90 tablet 1     loratadine (CLARITIN) 10 MG tablet Take 10 mg by mouth every morning       losartan (COZAAR) 50 MG tablet Take 1 tablet (50 mg) by mouth 2 times daily 180 tablet 1     metoprolol succinate (TOPROL-XL) 25 MG 24  hr tablet Take 1 tablet (25 mg) by mouth daily 90 tablet 3     Multiple Vitamins-Minerals (PRESERVISION AREDS PO) Take 1 tablet by mouth daily        nitroglycerin (NITROSTAT) 0.4 MG SL tablet Place 1 tablet (0.4 mg) under the tongue every 5 minutes as needed for chest pain 25 tablet 0     order for DME Dispense SP Walker-small 1 each 0     order for DME Equipment being ordered: Dispense baffle, for use with nebulizer. 1 each 0     order for DME Equipment being ordered: Nebulizer. Use with Albuterol. 1 each 0     order for DME Equipment being ordered: Dispense face mask.  Mrs. Spicer is immunosuppressed due to rheumatoid arthritis. 1 Box 11     propafenone (RYTHMOL) 150 MG TABS tablet Take 1 tablet (150 mg) by mouth 3 times daily 90 tablet 3     ranibizumab (LUCENTIS) 0.3 MG/0.05ML SOLN 0.3 mg by Intravitreal route Every 6 weeks       ranitidine (ZANTAC) 150 MG tablet Take 1 tablet (150 mg) by mouth 2 times daily 60 tablet 5     saccharomyces boulardii (FLORASTOR) 250 MG capsule Take 250 mg by mouth daily       senna-docusate (SENOKOT-S;PERICOLACE) 8.6-50 MG per tablet Take 2 tablets by mouth At Bedtime Hold if diarrhea occurs. 120 tablet 11     STATIN NOT PRESCRIBED, INTENTIONAL, Please choose reason not prescribed, below         Allergies   Allergen Reactions     Cephalexin Hcl Diarrhea     Gabapentin Other (See Comments)     Dizzsiness     Naproxen GI Disturbance     Perfume      Lactase Other (See Comments)     Macrobid [Nitrofurantoin Anhydrous]      Possibly related to lung disease      Seasonal Allergies      Sulfa Drugs      Throat swelling     Xanax [Alprazolam] Other (See Comments)     Dizziness      Ciprofloxacin Itching and Rash       Family History   Problem Relation Age of Onset     Hypertension Mother      Psychotic Disorder Father      Diabetes Son      Diabetes Daughter      Blood Disease Daughter        Additional medical/Social/Surgical histories [facility] and updated as appropriate.      REVIEW OF SYSTEMS (8/31/2018)  CONSTITUTIONAL: Denies fever and weight loss  EYES: Denies acute vision changes  ENT: Denies hearing changes or difficulty swallowing  CARDIAC: Denies chest pain or edema  RESPIRATORY: Denies dyspnea, cough or wheeze  GASTROINTESTINAL: Denies abdominal pain, nausea, vomiting  MUSCULOSKELETAL: See HPI  SKIN: Denies any recent rash or lesion  NEUROLOGICAL: Denies numbness or focal weakness  PSYCHIATRIC: No history of psychiatric symptoms or problems  ENDOCRINE: Denies current diagnosis of diabetes  HEMATOLOGY: Denies episodes of easy bleeding      PHYSICAL EXAM  Vitals:    08/30/18 1620   BP: 166/64   Pulse: 61   SpO2: 97%     General  - normal appearance, in no obvious distress  CV  - normal femoral pulse  Pulm  - normal respiratory pattern, non-labored  Musculoskeletal - left hip  - stance: in wheelchair, needs assistance with transfers  - inspection: no swelling or effusion,  normal bone and joint alignment, no obvious deformity  - palpation: tender over the greater trochanter and gluteal musculature to SI joint  - ROM: pain with active abduction, normal and painless flexion, extension, IR, ER, adduction  Neuro  - no sensory or motor deficit, grossly normal coordination, normal muscle tone  Skin  - no ecchymosis, erythema, warmth, or induration, no obvious rash  Psych  - interactive, appropriate, normal mood and affect           ASSESSMENT & PLAN  Ms. Spicer is a 87 year old year old female who presents to clinic today with left hip pain.    Linda's pain is not quite classic for trochanteric bursitis or enthesopathy, although it does have some features.  We had a long discussion about this.  Her pain is suggestive of a possible small gluteal muscle tear or SI joint pathology.  I did perform a lateral hip injection today (see procedure note) on a diagnostic and therapeutic basis.    Linda is going to let me know how she is doing in a week, if this injection was not effective I  will likely refer her for a fluoroscopic guided SI injection.    Thank you for allowing me to participate in Linda's care.    Trochanteric Hip Injection - Ultrasound Guided  The patient was informed of the risks and the benefits of the procedure and a written consent was signed.  The patient s left lateral hip was prepped with chlorhexidine in sterile fashion.   40 mg of triamcinolone suspension was drawn up into a 5 mL syringe with 2 mL of 1% lidocaine and 2 mL of 0.5% marcaine.  Injection was performed using sterile technique.  Under ultrasound guidance a 3.5-inch 25-gauge needle was used to enter the left josé luis-trochanteric tissue.  Needle placement was visualized and documented with ultrasound which was deemed necessary to ensure medication did not enter the tendon itself, which could potentially cause tendon damage.   Injection performed long axis to the probe with probe in short axis to the greater trochanter.  Expansion of the josé luis-trochanteric tissue was visualized under ultrasound upon injection.  Images were permanently stored for the patient's record.  There were no complications. The patient tolerated the procedure well. There was negligible bleeding.   The patient was instructed to ice the hip upon leaving clinic and refrain from overuse over the next 3 days.   The patient was instructed to call or go to the emergency room with any unusual pain, swelling, redness, or if otherwise concerned.  Kenalog: NDC [id number]          Joey Pina DO, CAKATEY  Primary Care Sports Medicine

## 2018-09-05 ENCOUNTER — OFFICE VISIT (OUTPATIENT)
Dept: CARDIOLOGY | Facility: CLINIC | Age: 83
End: 2018-09-05
Payer: MEDICARE

## 2018-09-05 ENCOUNTER — MEDICAL CORRESPONDENCE (OUTPATIENT)
Dept: HEALTH INFORMATION MANAGEMENT | Facility: CLINIC | Age: 83
End: 2018-09-05

## 2018-09-05 DIAGNOSIS — I49.5 SICK SINUS SYNDROME (H): Primary | ICD-10-CM

## 2018-09-05 PROCEDURE — 93280 PM DEVICE PROGR EVAL DUAL: CPT | Mod: 26 | Performed by: INTERNAL MEDICINE

## 2018-09-05 NOTE — PROGRESS NOTES
Preliminary Device Interrogation Results.  Final physician signed paceart report to be scanned and attached.    Patient seen in clinic for evaluation and iterative programming of her Medtronic dual lead pacemaker per MD orders.  Normal pacemaker function.  8 AT/AF episodes recorded - < 1 min - > 8 days.  AF burden = 22.7%.  It appears that the patient went into AFib on 8/28/18 @ 0046.  Patient is taking Eliquis.  Intrinsic rhythm = AFib with vent rate @ ~ 60 bpm.  AP = 74%.   = 18.2%.  Estimated battery longevity to ZENAIDA = 8 years.  Patient reports that she is feeling tired and was a little winded after a short walk today.  Patient states that she has not been sleeping very much lately.  Patient does report that her  is at home with hospice care and she is concerned that he will pass away sometime today or tomorrow.  Patient's daughter present for appointment.  Patient encouraged to rest and if symptoms increase to seek medical attention immediately.  Plan for patient to send a remote transmission in 3 months and return to clinic in 6 months.    Dual lead pacemaker

## 2018-09-05 NOTE — PATIENT INSTRUCTIONS
It was a pleasure to see you in clinic today.  Please do not hesitate to call with any questions or concerns.  You are scheduled for a remote transmission on 12/11/18.  We look forward to seeing you in clinic at your next device check in 6 months.    Rose Marquez, RN, MS, CCRN  Electrophysiology Nurse Clinician  [facility]    During Business Hours Please Call:  [phone]  After Hours Please Call:  [phone] - select option #4 and ask for job code [id number]

## 2018-09-06 ENCOUNTER — PATIENT OUTREACH (OUTPATIENT)
Dept: CARE COORDINATION | Facility: CLINIC | Age: 83
End: 2018-09-06

## 2018-09-06 ENCOUNTER — TELEPHONE (OUTPATIENT)
Dept: RHEUMATOLOGY | Facility: CLINIC | Age: 83
End: 2018-09-06

## 2018-09-06 ENCOUNTER — TELEPHONE (OUTPATIENT)
Dept: FAMILY MEDICINE | Facility: CLINIC | Age: 83
End: 2018-09-06

## 2018-09-06 DIAGNOSIS — F51.02 ADJUSTMENT INSOMNIA: Primary | ICD-10-CM

## 2018-09-06 RX ORDER — MIRTAZAPINE 15 MG/1
15 TABLET, FILM COATED ORAL AT BEDTIME
Qty: 30 TABLET | Refills: 1 | Status: ON HOLD | OUTPATIENT
Start: 2018-09-06 | End: 2018-10-16

## 2018-09-06 NOTE — TELEPHONE ENCOUNTER
Reason for Call:  Same Day Appointment, Requested Provider:  Dr. Mario Davenport    PCP: Tre Lockett    Reason for visit: 4 Month followup    Duration of symptoms: o going    Have you been treated for this in the past? Yes    Additional comments:  Pt's Daughter calling for Pt had to cancel her apt due to a family  and would like to see if Dr. Davenport can fit Pt into his 18 schedule as soon as possible tomorrow    Can we leave a detailed message on this number? YES    Phone number patient can be reached at: Cell number on file:    Telephone Information:   Mobile [phone]       Best Time: anytime    Call taken on 2018 at 12:49 PM by Siva Swanson

## 2018-09-06 NOTE — PROGRESS NOTES
Clinic Care Coordination Contact  Care Team Conversations    Patient left a voicemail for writer at 9:18 am requesting a prescription to help her sleep for the next week, as her  passed away yesterday.  See 9/6/18 telephone encounter sent to PCP.    RN CC attempted to return patient's call, however, there was no answer.  RN CC left patient a voicemail requesting a call back when she is up to it.    Melissa Behl BSN, RN, N  [facility] Care Coordinator  [phone]

## 2018-09-06 NOTE — TELEPHONE ENCOUNTER
Patient calls into RN CC requesting a medication to help her sleep for the next week, as her  passed away yesterday, 9/5/18.  To PCP to advise.    Melissa Behl BSN, RN, N  [facility] Care Coordinator  [phone]

## 2018-09-06 NOTE — TELEPHONE ENCOUNTER
Message left for patient.    Melissa Behl BSN, RN, N  [facility] Care Coordinator  [phone]

## 2018-09-06 NOTE — TELEPHONE ENCOUNTER
I sent in a prescription for Remeron to help with sleep at a low dose. This is pretty sedating, so she may want to start with half a tablet.   Lily Quintero MD

## 2018-09-06 NOTE — TELEPHONE ENCOUNTER
Spoke with patient's daughter Toya and advised her that there are no sooner openings at this time. Advised her of importance of getting labs done every 3 months and that she can keep her next scheduled appointment for January.    Nhan Patel RN....9/6/2018 2:15 PM

## 2018-09-07 ENCOUNTER — ALLIED HEALTH/NURSE VISIT (OUTPATIENT)
Dept: CARDIOLOGY | Facility: CLINIC | Age: 83
End: 2018-09-07
Attending: INTERNAL MEDICINE
Payer: MEDICARE

## 2018-09-07 DIAGNOSIS — I49.5 SICK SINUS SYNDROME (H): Primary | ICD-10-CM

## 2018-09-07 PROCEDURE — 93296 REM INTERROG EVL PM/IDS: CPT | Mod: ZF

## 2018-09-07 PROCEDURE — 93294 REM INTERROG EVL PM/LDLS PM: CPT | Performed by: INTERNAL MEDICINE

## 2018-09-07 NOTE — PROGRESS NOTES
Preliminary Device Interrogation Results.  Final physician signed paceart report to be scanned and attached.    Medtronic, dual lead pacemaker, remote transmission received and reviewed. Device transmission sent per MD orders. Pt called reporting extreme fatigue. Her  passed away 2 days ago. She is wondering if the fatigue is related to the stress of him passing or AF. She has been in AF since 8/14/18 and is taking eliquis. Her presenting rhythm is AF/ @ 70 bpm. Normal device function. Battery estimates 8 years to ZENAIDA. Pt notified of transmission results. She was encouraged to seek medical attention if she continues to feel poorly. Pt verbalized understanding.  Remote pacemaker transmission

## 2018-09-07 NOTE — MR AVS SNAPSHOT
After Visit Summary   9/7/2018    Linda Spicer    MRN: [id number]           Patient Information     Date Of Birth          6/13/1931        Visit Information        Provider Department      9/7/2018 6:00 AM  ICD REMOTE [facility]        Today's Diagnoses     Sick sinus syndrome (H)    -  1       Follow-ups after your visit        Your next 10 appointments already scheduled     Sep 13, 2018  9:30 AM CDT   Level 1 with BAY 8 INFUSION   [facility] ([facility])    [id number] [place] [address]   [phone]            Oct 11, 2018  9:00 AM CDT   Level 1 with BAY 7 INFUSION   [facility] ([facility])    [address] [place] [address]   [phone]            Dec 11, 2018 10:00 AM CST   Office Visit with PFT LAB   [facility])    [address] [place] [address]   [phone]           Bring a current list of meds and any records pertaining to this visit. For Physicals, please bring immunization records and any forms needing to be filled out. Please arrive 10 minutes early to complete paperwork.            Dec 11, 2018 11:00 AM CST   Return Visit with Dea Acosta MD   [facility])    [address] [place] [address]   [phone]            Jim 15, 2019  2:40 PM CST   Return Visit with Mario Davenport MD   [facility] ([facility])    [address] [place] [address]   [phone]            Feb 18, 2019  2:10 PM CST   Return Visit with Emeterio Loza MD   [facility])    [address] [place] [address]   [phone]            Mar 01, 2019 10:00 AM CST   Return Visit with Asia Steven PA-C   [facility]  [facility]    [address] [place] [address] [phone]              Who to contact     If you have questions or need follow up information about today's clinic visit or your schedule please contact [facility] directly at [phone].  Normal or non-critical lab and imaging results will be communicated to you by MyChart, letter or phone within 4 business days after the clinic has received the results. If you do not hear from us within 7 days, please contact the clinic through MyChart or phone. If you have a critical or abnormal lab result, we will notify you by phone as soon as possible.  Submit refill requests through [employer] or call your pharmacy and they will forward the refill request to us. Please allow 3 business days for your refill to be completed.          Additional Information About Your Visit        MyChart Information     [employer] gives you secure access to your electronic health record. If you see a primary care provider, you can also send messages to your care team and make appointments. If you have questions, please call your primary care clinic.  If you do not have a primary care provider, please call [phone] and they will assist you.        Care EveryWhere ID     This is your Care EveryWhere ID. This could be used by other organizations to access your [facility] medical records  [id number]        Your Vitals Were     Last Period                   (LMP Unknown)            Blood Pressure from Last 3 Encounters:   08/30/18 166/64   08/24/18 118/64   08/20/18 157/78    Weight from Last 3 Encounters:   08/24/18 75.2 kg (165 lb 12.8 oz)   08/20/18 75.2 kg (165 lb 12.8 oz)   08/15/18 74.9 kg (165 lb 1.6 oz)              We Performed the Following     INTERROGATION DEVICE EVAL REMOTE, PACER/ICD        Primary Care Provider Office Phone # Fax #    Tre Lockett [phone]       [address]  [facility] [address]        Goals         General    #1 Medication  (pt-stated)     Notes - Note created  8/21/2018 10:37 AM by Behl, Melissa K, RN    Goal Statement: I will take my medications as prescribed.  Measure of Success: Patient and home care will report consistently taking medication as prescribed.  Supportive Steps to Achieve: RN CC notified home care RN of medication error.  Barriers: multiple complex chronic health conditions  Strengths: has home care, care coordination and excellent family support  Date to Achieve By: 9/21/18  Patient expressed understanding of goal: yes         #2 I will complete my health care directive. (pt-stated)     Notes - Note edited  8/21/2018 10:39 AM by Behl, Melissa K, RN    Goal Statement: I will complete my health care directive.  Measure of Success: Health care directive will be completed and scanned into chart.  Supportive Steps to Achieve: Patient has attended a health care directive class, 10/24/17 informed of CPR vs intubation  Barriers: is awaiting to see her  to finalize document  Strengths: has care coordination, home care and excellent family support  Date to Achieve By: 12/31/18  Patient expressed understanding of goal: yes               Equal Access to Services     [facility]: Hadii rakesh schuster hadasho Soomaali, waaxda luqadaha, qaybta kaalmada adeegyada, zahraa moss . So [facility] [phone].    ATENCIÓN: Si habla español, tiene a merchant disposición servicios gratuitos de asistencia lingüística. Llame al [phone].    We comply with applicable federal civil rights laws and [place] laws. We do not discriminate on the basis of race, color, national origin, age, disability, sex, sexual orientation, or gender identity.            Thank you!     Thank you for choosing [facility]  for your care. Our goal is always to provide you with excellent care. Hearing back from our patients is one way we can continue to improve our services. Please take a few minutes to complete  the written survey that you may receive in the mail after your visit with us. Thank you!             Your Updated Medication List - Protect others around you: Learn how to safely use, store and throw away your medicines at www.disposemymeds.org.          This list is accurate as of 9/7/18  1:00 PM.  Always use your most recent med list.                   Brand Name Dispense Instructions for use Diagnosis    ACETAMINOPHEN PO      Take 1,000 mg by mouth 2 times daily as needed        albuterol (2.5 MG/3ML) 0.083% neb solution      Take 1 vial by nebulization every 6 hours as needed for shortness of breath / dyspnea or wheezing        apixaban ANTICOAGULANT 2.5 MG tablet    ELIQUIS    180 tablet    Take 1 tablet (2.5 mg) by mouth 2 times daily    Atrial flutter, paroxysmal (H)       azaTHIOprine 50 MG tablet    IMURAN    90 tablet    Take 1 tablet (50 mg) by mouth daily    Rheumatoid arthritis involving multiple sites with positive rheumatoid factor (H)       Blood Pressure Monitor Kit     1 kit    1 kit daily as needed    CHF (congestive heart failure) (H)       calcium carbonate 600 mg-vitamin D 400 units 600-400 MG-UNIT per tablet    CALTRATE     Take 1 tablet by mouth 2 times daily        COMPOUNDED NON-CONTROLLED SUBSTANCE - PHARMACY TO MIX COMPOUNDED MEDICATION    CMPD RX    30 g    Estriol 1mg/gram. Place 1 gram vaginally daily for two weeks, then vaginally twice weekly after.    Atrophic vaginitis       fish oil-omega-3 fatty acids 1000 MG capsule      Take 2 g by mouth daily. 2 capsules daily        folic acid 1 MG tablet    FOLVITE    90 tablet    Take 1 tablet (1 mg) by mouth daily    Oral aphthae       GENTEAL MILD OP      Apply  to eye daily.        hydrochlorothiazide 25 MG tablet    HYDRODIURIL    30 tablet    Take 1 tablet (25 mg) by mouth daily    Hypertension goal BP (blood pressure) < 150/90       hydrocortisone 2.5 % cream     30 g    Apply BID to affected region(s) for 7-10 days.    Rash        levothyroxine 75 MCG tablet    SYNTHROID/LEVOTHROID    90 tablet    TAKE ONE TABLET BY MOUTH ONCE DAILY    Hypothyroidism, unspecified type       loratadine 10 MG tablet    CLARITIN     Take 10 mg by mouth every morning        losartan 50 MG tablet    COZAAR    180 tablet    Take 1 tablet (50 mg) by mouth 2 times daily    Heart failure with preserved ejection fraction, NYHA class I (H), Hypertension goal BP (blood pressure) < 130/80       metoprolol succinate 25 MG 24 hr tablet    TOPROL-XL    90 tablet    Take 1 tablet (25 mg) by mouth daily    Hypertension goal BP (blood pressure) < 140/90       mirtazapine 15 MG tablet    REMERON    30 tablet    Take 1 tablet (15 mg) by mouth At Bedtime    Adjustment insomnia       nitroGLYcerin 0.4 MG sublingual tablet    NITROSTAT    25 tablet    Place 1 tablet (0.4 mg) under the tongue every 5 minutes as needed for chest pain    Chest pain       * order for DME     1 Box    Equipment being ordered: Dispense face mask. Mrs. Spicer is immunosuppressed due to rheumatoid arthritis.    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 Use with Albuterol.    Hypoxia       order for DME     1 each    Equipment being ordered: Dispense baffle, for use with nebulizer.    Pneumonia of left upper lobe due to Mycoplasma pneumoniae       * order for DME     1 each    Dispense SP Walker-small    Pain of toe of right foot, Status post bunionectomy       PRESERVISION AREDS PO      Take 1 tablet by mouth daily        propafenone 150 MG Tabs tablet    RYTHMOL    90 tablet    Take 1 tablet (150 mg) by mouth 3 times daily    Atrial flutter (H)       ranibizumab 0.3 MG/0.05ML Soln    LUCENTIS     0.3 mg by Intravitreal route Every 6 weeks        ranitidine 150 MG tablet    ZANTAC    60 tablet    Take 1 tablet (150 mg) by mouth 2 times daily    Gastroesophageal reflux disease without esophagitis        REFRESH P.M. Oint      Apply  to eye. Daily at bedtime        saccharomyces boulardii 250 MG capsule    FLORASTOR     Take 250 mg by mouth daily        senna-docusate 8.6-50 MG per tablet    SENOKOT-S;PERICOLACE    120 tablet    Take 2 tablets by mouth At Bedtime Hold if diarrhea occurs.    Constipation, chronic       STATIN NOT PRESCRIBED (INTENTIONAL)      Please choose reason not prescribed, below    Hyperlipidemia LDL goal <100       VITAMIN C PO      Take 500 mg by mouth daily        ZYRTEC ALLERGY 10 MG tablet   Generic drug:  cetirizine      Take 10 mg by mouth At Bedtime        * Notice:  This list has 3 medication(s) that are the same as other medications prescribed for you. Read the directions carefully, and ask your doctor or other care provider to review them with you.

## 2018-09-13 ENCOUNTER — ALLIED HEALTH/NURSE VISIT (OUTPATIENT)
Dept: CARDIOLOGY | Facility: CLINIC | Age: 83
End: 2018-09-13
Attending: INTERNAL MEDICINE
Payer: MEDICARE

## 2018-09-13 ENCOUNTER — INFUSION THERAPY VISIT (OUTPATIENT)
Dept: INFUSION THERAPY | Facility: CLINIC | Age: 83
End: 2018-09-13
Payer: MEDICARE

## 2018-09-13 VITALS
OXYGEN SATURATION: 100 % | HEART RATE: 61 BPM | DIASTOLIC BLOOD PRESSURE: 72 MMHG | BODY MASS INDEX: 29.06 KG/M2 | RESPIRATION RATE: 18 BRPM | TEMPERATURE: 98.6 F | WEIGHT: 169.3 LBS | SYSTOLIC BLOOD PRESSURE: 112 MMHG

## 2018-09-13 DIAGNOSIS — I49.5 SICK SINUS SYNDROME (H): ICD-10-CM

## 2018-09-13 DIAGNOSIS — M05.79 RHEUMATOID ARTHRITIS INVOLVING MULTIPLE SITES WITH POSITIVE RHEUMATOID FACTOR (H): Primary | ICD-10-CM

## 2018-09-13 DIAGNOSIS — I48.91 ATRIAL FIBRILLATION (H): Primary | ICD-10-CM

## 2018-09-13 PROCEDURE — 96365 THER/PROPH/DIAG IV INF INIT: CPT | Performed by: NURSE PRACTITIONER

## 2018-09-13 PROCEDURE — 99207 ZZC NO CHARGE LOS: CPT

## 2018-09-13 PROCEDURE — 93296 REM INTERROG EVL PM/IDS: CPT | Mod: ZF

## 2018-09-13 PROCEDURE — 93294 REM INTERROG EVL PM/LDLS PM: CPT | Performed by: INTERNAL MEDICINE

## 2018-09-13 RX ADMIN — Medication 250 ML: at 10:09

## 2018-09-13 ASSESSMENT — PAIN SCALES - GENERAL: PAINLEVEL: NO PAIN (0)

## 2018-09-13 NOTE — MR AVS SNAPSHOT
After Visit Summary   9/13/2018    Linda Spicer    MRN: [id number]           Patient Information     Date Of Birth          6/13/1931        Visit Information        Provider Department      9/13/2018 9:30 AM [place] 8 INFUSION [facility]        Today's Diagnoses     Rheumatoid arthritis involving multiple sites with positive rheumatoid factor (H)    -  1       Follow-ups after your visit        Your next 10 appointments already scheduled     Oct 11, 2018  9:00 AM CDT   Level 1 with [place] 7 [facility] ([facility])    [address] [place] [address]   [phone]            Nov 08, 2018  9:30 AM CST   Level 1 with [place] 9 [facility] ([facility])    [address] [place] [address]   [phone]            Dec 11, 2018 10:00 AM CST   Office Visit with PFT LAB   [facility])    [address] [place] [address]   [phone]           Bring a current list of meds and any records pertaining to this visit. For Physicals, please bring immunization records and any forms needing to be filled out. Please arrive 10 minutes early to complete paperwork.            Dec 11, 2018 11:00 AM CST   Return Visit with Dea Acosta MD   [facility])    [address] [place] [address]   [phone]            Jim 15, 2019  2:40 PM CST   Return Visit with Mario Davenport MD   [facility] ([facility])    [address] [place] [address]   [phone]            Feb 18, 2019  2:10 PM CST   Return Visit with Emeterio Loza MD   [facility])    [address] [place] [address]   [phone]            Mar 01, 2019 10:00 AM CST   Return Visit  with Asia Steven PA-C   [facility] ([facility])    [address] [place] [address] [phone]              Who to contact     If you have questions or need follow up information about today's clinic visit or your schedule please contact [facility] directly at [phone].  Normal or non-critical lab and imaging results will be communicated to you by [facility], letter or phone within 4 business days after the clinic has received the results. If you do not hear from us within 7 days, please contact the clinic through [facility] or phone. If you have a critical or abnormal lab result, we will notify you by phone as soon as possible.  Submit refill requests through Walk-in or call your pharmacy and they will forward the refill request to us. Please allow 3 business days for your refill to be completed.          Additional Information About Your Visit        Walk-in Information     Walk-in gives you secure access to your electronic health record. If you see a primary care provider, you can also send messages to your care team and make appointments. If you have questions, please call your primary care clinic.  If you do not have a primary care provider, please call [phone] and they will assist you.      Walk-in is an electronic gateway that provides easy, online access to your medical records. With Walk-in, you can request a clinic appointment, read your test results, renew a prescription or communicate with your care team.     To access your existing account, please contact your [facility] or call [phone] for assistance.        Care EveryWhere ID     This is your Care EveryWhere ID. This could be used by other organizations to access your [facility] medical records  [id number]        Your Vitals Were     Pulse Temperature Respirations Last Period Pulse Oximetry BMI (Body Mass Index)    61 98.6  F (37  C)  (Oral) 18 (LMP Unknown) 100% 29.06 kg/m2       Blood Pressure from Last 3 Encounters:   09/13/18 112/72   08/30/18 166/64   08/24/18 118/64    Weight from Last 3 Encounters:   09/13/18 76.8 kg (169 lb 4.8 oz)   08/24/18 75.2 kg (165 lb 12.8 oz)   08/20/18 75.2 kg (165 lb 12.8 oz)              Today, you had the following     No orders found for display       Primary Care Provider Office Phone # Fax #    Tre Jenny Lockett [phone]       [address]  [facility] [address]        Goals        General    #1 Medication  (pt-stated)     Notes - Note created  8/21/2018 10:37 AM by Behl, Melissa K, RN    Goal Statement: I will take my medications as prescribed.  Measure of Success: Patient and home care will report consistently taking medication as prescribed.  Supportive Steps to Achieve: RN CC notified home care RN of medication error.  Barriers: multiple complex chronic health conditions  Strengths: has home care, care coordination and excellent family support  Date to Achieve By: 9/21/18  Patient expressed understanding of goal: yes         #2 I will complete my health care directive. (pt-stated)     Notes - Note edited  8/21/2018 10:39 AM by Behl, Melissa K, RN    Goal Statement: I will complete my health care directive.  Measure of Success: Health care directive will be completed and scanned into chart.  Supportive Steps to Achieve: Patient has attended a health care directive class, 10/24/17 informed of CPR vs intubation  Barriers: is awaiting to see her  to finalize document  Strengths: has care coordination, home care and excellent family support  Date to Achieve By: 12/31/18  Patient expressed understanding of goal: yes               Equal Access to Services     [facility]: Paul Goyal, wareidda luqadaha, qaybta kaalmada aderyanneda, zahraa skinner. [facility] [phone].    ATENCIÓN: Si habla español, tiene a merchant disposición servicios  yane de asistencia lingüística. Alin george [phone].    We comply with applicable federal civil rights laws and [place] laws. We do not discriminate on the basis of race, color, national origin, age, disability, sex, sexual orientation, or gender identity.            Thank you!     Thank you for choosing [facility]  for your care. Our goal is always to provide you with excellent care. Hearing back from our patients is one way we can continue to improve our services. Please take a few minutes to complete the written survey that you may receive in the mail after your visit with us. Thank you!             Your Updated Medication List - Protect others around you: Learn how to safely use, store and throw away your medicines at www.disposemymeds.org.          This list is accurate as of 9/13/18 11:03 AM.  Always use your most recent med list.                   Brand Name Dispense Instructions for use Diagnosis    ACETAMINOPHEN PO      Take 1,000 mg by mouth 2 times daily as needed        albuterol (2.5 MG/3ML) 0.083% neb solution      Take 1 vial by nebulization every 6 hours as needed for shortness of breath / dyspnea or wheezing        apixaban ANTICOAGULANT 2.5 MG tablet    ELIQUIS    180 tablet    Take 1 tablet (2.5 mg) by mouth 2 times daily    Atrial flutter, paroxysmal (H)       azaTHIOprine 50 MG tablet    IMURAN    90 tablet    Take 1 tablet (50 mg) by mouth daily    Rheumatoid arthritis involving multiple sites with positive rheumatoid factor (H)       Blood Pressure Monitor Kit     1 kit    1 kit daily as needed    CHF (congestive heart failure) (H)       calcium carbonate 600 mg-vitamin D 400 units 600-400 MG-UNIT per tablet    CALTRATE     Take 1 tablet by mouth 2 times daily        COMPOUNDED NON-CONTROLLED SUBSTANCE - PHARMACY TO MIX COMPOUNDED MEDICATION    CMPD RX    30 g    Estriol 1mg/gram. Place 1 gram vaginally daily for two weeks, then vaginally twice weekly after.     Atrophic vaginitis       fish oil-omega-3 fatty acids 1000 MG capsule      Take 2 g by mouth daily. 2 capsules daily        folic acid 1 MG tablet    FOLVITE    90 tablet    Take 1 tablet (1 mg) by mouth daily    Oral aphthae       GENTEAL MILD OP      Apply  to eye daily.        hydrochlorothiazide 25 MG tablet    HYDRODIURIL    30 tablet    Take 1 tablet (25 mg) by mouth daily    Hypertension goal BP (blood pressure) < 150/90       hydrocortisone 2.5 % cream     30 g    Apply BID to affected region(s) for 7-10 days.    Rash       levothyroxine 75 MCG tablet    SYNTHROID/LEVOTHROID    90 tablet    TAKE ONE TABLET BY MOUTH ONCE DAILY    Hypothyroidism, unspecified type       loratadine 10 MG tablet    CLARITIN     Take 10 mg by mouth every morning        losartan 50 MG tablet    COZAAR    180 tablet    Take 1 tablet (50 mg) by mouth 2 times daily    Heart failure with preserved ejection fraction, NYHA class I (H), Hypertension goal BP (blood pressure) < 130/80       metoprolol succinate 25 MG 24 hr tablet    TOPROL-XL    90 tablet    Take 1 tablet (25 mg) by mouth daily    Hypertension goal BP (blood pressure) < 140/90       mirtazapine 15 MG tablet    REMERON    30 tablet    Take 1 tablet (15 mg) by mouth At Bedtime    Adjustment insomnia       nitroGLYcerin 0.4 MG sublingual tablet    NITROSTAT    25 tablet    Place 1 tablet (0.4 mg) under the tongue every 5 minutes as needed for chest pain    Chest pain       * order for DME     1 Box    Equipment being ordered: Dispense face mask. Mrs. Spicer is immunosuppressed due to rheumatoid arthritis.    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 Use with Albuterol.    Hypoxia       order for DME     1 each    Equipment being ordered: Dispense baffle, for use with nebulizer.    Pneumonia of left upper lobe due to Mycoplasma pneumoniae       * order for DME     1 each     Dispense SP Walker-small    Pain of toe of right foot, Status post bunionectomy       PRESERVISION AREDS PO      Take 1 tablet by mouth daily        propafenone 150 MG Tabs tablet    RYTHMOL    90 tablet    Take 1 tablet (150 mg) by mouth 3 times daily    Atrial flutter (H)       ranibizumab 0.3 MG/0.05ML Soln    LUCENTIS     0.3 mg by Intravitreal route Every 6 weeks        ranitidine 150 MG tablet    ZANTAC    60 tablet    Take 1 tablet (150 mg) by mouth 2 times daily    Gastroesophageal reflux disease without esophagitis       REFRESH P.M. Oint      Apply  to eye. Daily at bedtime        saccharomyces boulardii 250 MG capsule    FLORASTOR     Take 250 mg by mouth daily        senna-docusate 8.6-50 MG per tablet    SENOKOT-S;PERICOLACE    120 tablet    Take 2 tablets by mouth At Bedtime Hold if diarrhea occurs.    Constipation, chronic       STATIN NOT PRESCRIBED (INTENTIONAL)      Please choose reason not prescribed, below    Hyperlipidemia LDL goal <100       VITAMIN C PO      Take 500 mg by mouth daily        ZYRTEC ALLERGY 10 MG tablet   Generic drug:  cetirizine      Take 10 mg by mouth At Bedtime        * Notice:  This list has 3 medication(s) that are the same as other medications prescribed for you. Read the directions carefully, and ask your doctor or other care provider to review them with you.

## 2018-09-13 NOTE — MR AVS SNAPSHOT
After Visit Summary   9/13/2018    Linda Spicer    MRN: [id number]           Patient Information     Date Of Birth          6/13/1931        Visit Information        Provider Department      9/13/2018 6:00 AM [facility]        Today's Diagnoses     Atrial fibrillation (H)    -  1    Sick sinus syndrome (H)           Follow-ups after your visit        Your next 10 appointments already scheduled     Sep 14, 2018 12:30 PM CDT   Cardioversion with [id number]   [facility] [place],  Echocardiography ([facility], [facility])    [address]  [place] [address]   [phone]           1) NPO for 8 hours prior to the procedure except for sips of water with medications. 2) Hold insulin or oral diabetic medications morning of procedure. May take   dose long acting insulin. Follow further instructions of PMD. 3) Continue daily Coumadin. ~ If taking Digoxin, hold AM of procedure. ~ Plan to have a responsible adult take you home after the exam. You may not drive, take a bus or taxi by yourself. ~ A responsible adult must stay with you for 24 hours after the test.            Sep 17, 2018 12:30 PM CDT   Return Visit with Emeterio Loza MD   [facility])    [address] [facility] [address]   [phone]            Oct 11, 2018  9:00 AM CDT   Level 1 with BAY 7 INFUSION   [facility])    [address] [facility] [address]   [phone]            Nov 08, 2018  9:30 AM CST   Level 1 with BAY 9 INFUSION   [facility] ([facility])    [address] [facility] [address]   [phone]            Dec 11, 2018 10:00 AM CST   Office Visit with PFT LAB   [facility])    [address] [facility] [address]   [phone]           Bring a current  list of meds and any records pertaining to this visit. For Physicals, please bring immunization records and any forms needing to be filled out. Please arrive 10 minutes early to complete paperwork.            Dec 11, 2018 11:00 AM CST   [facility] Visit with Dea Acosta MD   [facility]    [address] [place] [address]   [phone]            Jim 15, 2019  2:40 PM CST   Return Visit with Mario Davenport MD   [facility] ([facility])    [address] [place] [address]   [phone]            Feb 18, 2019  2:10 PM CST   Return Visit with Emeterio Loza MD   [facility])    [address] [place] [address]   [phone]            Mar 01, 2019 10:00 AM CST   Return Visit with Asia Steven PA-C   [facility] ([facility])    [address] [place] [address]   [phone]              Future tests that were ordered for you today     Open Future Orders        Priority Expected Expires Ordered    Cardioversion Routine  9/14/2019 9/14/2018    Cardioversion Routine 9/14/2018 9/14/2019 9/14/2018            Who to contact     If you have questions or need follow up information about today's clinic visit or your schedule please contact [facility] HEART [facility] directly at [phone].  Normal or non-critical lab and imaging results will be communicated to you by MyChart, letter or phone within 4 business days after the clinic has received the results. If you do not hear from us within 7 days, please contact the clinic through MyChart or phone. If you have a critical or abnormal lab result, we will notify you by phone as soon as possible.  Submit refill requests through [employer] or call your pharmacy and they will forward the refill request to us. Please allow 3 business days for your  refill to be completed.          Additional Information About Your Visit        [employer] Information     [employer] gives you secure access to your electronic health record. If you see a primary care provider, you can also send messages to your care team and make appointments. If you have questions, please call your primary care clinic.  If you do not have a primary care provider, please call [phone] and they will assist you.        Care EveryWhere ID     This is your Care EveryWhere ID. This could be used by other organizations to access your [place] medical records  [id number]        Your Vitals Were     Last Period                   (LMP Unknown)            Blood Pressure from Last 3 Encounters:   09/13/18 112/72   08/30/18 166/64   08/24/18 118/64    Weight from Last 3 Encounters:   09/13/18 76.8 kg (169 lb 4.8 oz)   08/24/18 75.2 kg (165 lb 12.8 oz)   08/20/18 75.2 kg (165 lb 12.8 oz)              We Performed the Following     INTERROGATION DEVICE EVAL REMOTE, PACER/ICD        Primary Care Provider Office Phone # Fax #    Tre Lockett [phone]       [address] [facility] [address]        Goals        General    #1 Medication  (pt-stated)     Notes - Note created  8/21/2018 10:37 AM by Behl, Melissa K, RN    Goal Statement: I will take my medications as prescribed.  Measure of Success: Patient and home care will report consistently taking medication as prescribed.  Supportive Steps to Achieve: RN CC notified home care RN of medication error.  Barriers: multiple complex chronic health conditions  Strengths: has home care, care coordination and excellent family support  Date to Achieve By: 9/21/18  Patient expressed understanding of goal: yes         #2 I will complete my health care directive. (pt-stated)     Notes - Note edited  8/21/2018 10:39 AM by Behl, Melissa K, RN    Goal Statement: I will complete my health care directive.  Measure of Success: Health care  directive will be completed and scanned into chart.  Supportive Steps to Achieve: Patient has attended a health care directive class, 10/24/17 informed of CPR vs intubation  Barriers: is awaiting to see her  to finalize document  Strengths: has care coordination, home care and excellent family support  Date to Achieve By: 12/31/18  Patient expressed understanding of goal: yes               Equal Access to Services     [facility] : Hadii aad ku hadasho Soomaali, waaxda luqadaha, qaybta kaalmada ana, zahraa quezadairmaaddy moss . So [facility] [phone].    ATENCIÓN: Si habla español, tiene a merchant disposición servicios gratuitos de asistencia lingüística. Llame al [phone].    We comply with applicable federal civil rights laws and [place] laws. We do not discriminate on the basis of race, color, national origin, age, disability, sex, sexual orientation, or gender identity.            Thank you!     Thank you for choosing [facility]  for your care. Our goal is always to provide you with excellent care. Hearing back from our patients is one way we can continue to improve our services. Please take a few minutes to complete the written survey that you may receive in the mail after your visit with us. Thank you!             Your Updated Medication List - Protect others around you: Learn how to safely use, store and throw away your medicines at www.disposemymeds.org.          This list is accurate as of 9/13/18 11:59 PM.  Always use your most recent med list.                   Brand Name Dispense Instructions for use Diagnosis    ACETAMINOPHEN PO      Take 1,000 mg by mouth 2 times daily as needed        albuterol (2.5 MG/3ML) 0.083% neb solution      Take 1 vial by nebulization every 6 hours as needed for shortness of breath / dyspnea or wheezing        apixaban ANTICOAGULANT 2.5 MG tablet    ELIQUIS    180 tablet    Take 1 tablet (2.5 mg) by mouth 2 times daily    Atrial flutter,  paroxysmal (H)       azaTHIOprine 50 MG tablet    IMURAN    90 tablet    Take 1 tablet (50 mg) by mouth daily    Rheumatoid arthritis involving multiple sites with positive rheumatoid factor (H)       Blood Pressure Monitor Kit     1 kit    1 kit daily as needed    CHF (congestive heart failure) (H)       calcium carbonate 600 mg-vitamin D 400 units 600-400 MG-UNIT per tablet    CALTRATE     Take 1 tablet by mouth 2 times daily        COMPOUNDED NON-CONTROLLED SUBSTANCE - PHARMACY TO MIX COMPOUNDED MEDICATION    CMPD RX    30 g    Estriol 1mg/gram. Place 1 gram vaginally daily for two weeks, then vaginally twice weekly after.    Atrophic vaginitis       fish oil-omega-3 fatty acids 1000 MG capsule      Take 2 g by mouth daily. 2 capsules daily        folic acid 1 MG tablet    FOLVITE    90 tablet    Take 1 tablet (1 mg) by mouth daily    Oral aphthae       GENTEAL MILD OP      Apply  to eye daily.        hydrochlorothiazide 25 MG tablet    HYDRODIURIL    30 tablet    Take 1 tablet (25 mg) by mouth daily    Hypertension goal BP (blood pressure) < 150/90       hydrocortisone 2.5 % cream     30 g    Apply BID to affected region(s) for 7-10 days.    Rash       levothyroxine 75 MCG tablet    SYNTHROID/LEVOTHROID    90 tablet    TAKE ONE TABLET BY MOUTH ONCE DAILY    Hypothyroidism, unspecified type       loratadine 10 MG tablet    CLARITIN     Take 10 mg by mouth every morning        losartan 50 MG tablet    COZAAR    180 tablet    Take 1 tablet (50 mg) by mouth 2 times daily    Heart failure with preserved ejection fraction, NYHA class I (H), Hypertension goal BP (blood pressure) < 130/80       metoprolol succinate 25 MG 24 hr tablet    TOPROL-XL    90 tablet    Take 1 tablet (25 mg) by mouth daily    Hypertension goal BP (blood pressure) < 140/90       mirtazapine 15 MG tablet    REMERON    30 tablet    Take 1 tablet (15 mg) by mouth At Bedtime    Adjustment insomnia       nitroGLYcerin 0.4 MG sublingual tablet     NITROSTAT    25 tablet    Place 1 tablet (0.4 mg) under the tongue every 5 minutes as needed for chest pain    Chest pain       * order for DME     1 Box    Equipment being ordered: Dispense face mask. Mrs. Spicer is immunosuppressed due to rheumatoid arthritis.    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 Use with Albuterol.    Hypoxia       order for DME     1 each    Equipment being ordered: Dispense baffle, for use with nebulizer.    Pneumonia of left upper lobe due to Mycoplasma pneumoniae       * order for DME     1 each    Dispense SP Walker-small    Pain of toe of right foot, Status post bunionectomy       PRESERVISION AREDS PO      Take 1 tablet by mouth daily        propafenone 150 MG Tabs tablet    RYTHMOL    90 tablet    Take 1 tablet (150 mg) by mouth 3 times daily    Atrial flutter (H)       ranibizumab 0.3 MG/0.05ML Soln    LUCENTIS     0.3 mg by Intravitreal route Every 6 weeks        ranitidine 150 MG tablet    ZANTAC    60 tablet    Take 1 tablet (150 mg) by mouth 2 times daily    Gastroesophageal reflux disease without esophagitis       REFRESH P.M. Oint      Apply  to eye. Daily at bedtime        saccharomyces boulardii 250 MG capsule    FLORASTOR     Take 250 mg by mouth daily        senna-docusate 8.6-50 MG per tablet    SENOKOT-S;PERICOLACE    120 tablet    Take 2 tablets by mouth At Bedtime Hold if diarrhea occurs.    Constipation, chronic       STATIN NOT PRESCRIBED (INTENTIONAL)      Please choose reason not prescribed, below    Hyperlipidemia LDL goal <100       VITAMIN C PO      Take 500 mg by mouth daily        ZYRTEC ALLERGY 10 MG tablet   Generic drug:  cetirizine      Take 10 mg by mouth At Bedtime        * Notice:  This list has 3 medication(s) that are the same as other medications prescribed for you. Read the directions carefully, and ask your doctor or other care provider to  review them with you.

## 2018-09-13 NOTE — PROGRESS NOTES
"Infusion Nursing Note:  Linadterell Spicer presents today for Orencia.    Patient seen by provider today: No   present during visit today: Not Applicable.    Note: patient recently visited ER for pacemaker reading of A fib. She was discharged stable and has been under care of family without further incident.  Her stress level has been significant since her  passed away several days ago.    Intravenous Access:  Peripheral IV placed.    Treatment Conditions:  Rheumatology Infusion Checklist: PRIOR TO INFUSION OF BIOLOGICAL MEDICATIONS OR ANY OF THESE AS LISTED: Orencia (abatacept) \".rheumbiologicalchecklist\"    Prior to Infusion of biological medications or any of these as listed:    1. Elevated temperature, fever, chills, productive cough or abnormal vital signs, night sweats, coughing up blood or sputum, no appetite or abnormal vital signs : NO    2. Open wounds or new incisions: NO    3. Recent hospitalization: NO    4.  Recent surgeries:  NO    5. Any upcoming surgeries or dental procedures?:NO    6. Any current or recent bouts of illness or infection? On any antibiotics? : NO    7. Any new, sudden or worsening abdominal pain :NO    8. Vaccination within 4 weeks? Patient or someone in the household is scheduled to receive vaccination? No live virus vaccines prior to or during treatment :NO    9. Any nervous system diseases [i.e. multiple sclerosis, Guillain-Othello, seizures, neurological  changes]: NO    10. Pregnant or breast feeding; or plans on pregnancy in the future: NO    11. Signs of worsening depression or suicidal ideations while taking benlysta:NO    12. New-onset medical symptoms: NO    13.  New cancer diagnosis or on chemotherapy or radiation NO    14.  Evaluate for any sign of active TB [Unexplained weight loss, Loss of appetite, Night sweats, Fever, Fatigue, Chills, Coughing for 3 weeks or longer, Hemoptysis (coughing up blood), Chest pain]: NO    **Note: If answered yes to any of the " above, hold the infusion and contact ordering rheumatologist or on-call rheumatologist.     Post Infusion Assessment:  Patient tolerated infusion without incident.    Discharge Plan:   RTC 10/11 as scheduled.    BERENICE BONILLA RN

## 2018-09-14 ENCOUNTER — HOSPITAL ENCOUNTER (OUTPATIENT)
Dept: CARDIOLOGY | Facility: CLINIC | Age: 83
End: 2018-09-14
Attending: NURSE PRACTITIONER
Payer: MEDICARE

## 2018-09-14 ENCOUNTER — SURGERY (OUTPATIENT)
Age: 83
End: 2018-09-14

## 2018-09-14 ENCOUNTER — APPOINTMENT (OUTPATIENT)
Dept: MEDSURG UNIT | Facility: CLINIC | Age: 83
End: 2018-09-14
Attending: NURSE PRACTITIONER
Payer: MEDICARE

## 2018-09-14 ENCOUNTER — HOSPITAL ENCOUNTER (OUTPATIENT)
Facility: CLINIC | Age: 83
Discharge: HOME | End: 2018-09-14
Attending: INTERNAL MEDICINE | Admitting: INTERNAL MEDICINE
Payer: MEDICARE

## 2018-09-14 ENCOUNTER — ANESTHESIA EVENT (OUTPATIENT)
Dept: SURGERY | Facility: CLINIC | Age: 83
End: 2018-09-14
Payer: MEDICARE

## 2018-09-14 ENCOUNTER — APPOINTMENT (OUTPATIENT)
Dept: LAB | Facility: CLINIC | Age: 83
End: 2018-09-14
Attending: NURSE PRACTITIONER
Payer: MEDICARE

## 2018-09-14 ENCOUNTER — ANESTHESIA (OUTPATIENT)
Dept: SURGERY | Facility: CLINIC | Age: 83
End: 2018-09-14
Payer: MEDICARE

## 2018-09-14 VITALS
BODY MASS INDEX: 26.91 KG/M2 | RESPIRATION RATE: 16 BRPM | HEART RATE: 56 BPM | OXYGEN SATURATION: 98 % | TEMPERATURE: 98 F | HEIGHT: 64 IN | SYSTOLIC BLOOD PRESSURE: 189 MMHG | WEIGHT: 157.6 LBS | DIASTOLIC BLOOD PRESSURE: 69 MMHG

## 2018-09-14 VITALS
DIASTOLIC BLOOD PRESSURE: 77 MMHG | SYSTOLIC BLOOD PRESSURE: 163 MMHG | OXYGEN SATURATION: 100 % | RESPIRATION RATE: 16 BRPM | HEART RATE: 60 BPM

## 2018-09-14 DIAGNOSIS — I48.0 PAROXYSMAL ATRIAL FIBRILLATION (H): ICD-10-CM

## 2018-09-14 DIAGNOSIS — I48.91 ATRIAL FIBRILLATION (H): ICD-10-CM

## 2018-09-14 LAB
MAGNESIUM SERPL-MCNC: 1.9 MG/DL (ref 1.6–2.3)
POTASSIUM SERPL-SCNC: 4.7 MMOL/L (ref 3.4–5.3)

## 2018-09-14 PROCEDURE — 36415 COLL VENOUS BLD VENIPUNCTURE: CPT | Performed by: NURSE PRACTITIONER

## 2018-09-14 PROCEDURE — 84132 ASSAY OF SERUM POTASSIUM: CPT | Performed by: NURSE PRACTITIONER

## 2018-09-14 PROCEDURE — 92960 CARDIOVERSION ELECTRIC EXT: CPT

## 2018-09-14 PROCEDURE — 83735 ASSAY OF MAGNESIUM: CPT | Performed by: NURSE PRACTITIONER

## 2018-09-14 PROCEDURE — 93010 ELECTROCARDIOGRAM REPORT: CPT | Mod: 76 | Performed by: INTERNAL MEDICINE

## 2018-09-14 PROCEDURE — 40000065 ZZH STATISTIC EKG NON-CHARGEABLE

## 2018-09-14 PROCEDURE — 92960 CARDIOVERSION ELECTRIC EXT: CPT | Mod: GC | Performed by: INTERNAL MEDICINE

## 2018-09-14 PROCEDURE — 40000166 ZZH STATISTIC PP CARE STAGE 1

## 2018-09-14 PROCEDURE — 37000008 ZZH ANESTHESIA TECHNICAL FEE, 1ST 30 MIN

## 2018-09-14 PROCEDURE — 25000125 ZZHC RX 250: Performed by: NURSE ANESTHETIST, CERTIFIED REGISTERED

## 2018-09-14 RX ADMIN — METHADONE HYDROCHLORIDE 30 MG: 5 SOLUTION ORAL at 15:51

## 2018-09-14 ASSESSMENT — ENCOUNTER SYMPTOMS: DYSRHYTHMIAS: 1

## 2018-09-14 NOTE — PROGRESS NOTES
"Preliminary Device Interrogation Results.  Final physician signed paceart report to be scanned and attached.    [employer], dual chamber pacemaker, remote transmission received and reviewed. Device transmission sent per MD orders. Pt called reporting \"I'm just exhausted. I can't even function\". She has been in AF since 8/28/18. She is taking eliquis. Her presenting rhythm is AF/ @ 60 bpm. Normal device function. AP= 0% and = 81%. No short V-V intervals recorded. Lead trends appear stable. Battery estimates 8 years to ZENAIDA. Pt notified of transmission results. Pt and symptoms discussed with Saloni Armenta NP. Pt will be scheduled for a DCCV today at 1400.  Remote pacemaker transmission      "

## 2018-09-14 NOTE — OP NOTE
CARDIOVERSION    PROCEDURE:  direct current cardioversion  PROCEDURE DATE: 09/14/18    Pre-procedure diagnosis:  Symptomatic atrial fibrillation  Post-procedure diagnosis: Successful cardioversion to an atrial-paced rhythm  Complications:  none    BRIEF CLINICAL HISTORY:  86 yo female patient of Dr. Emeterio Loza with a documented history of paroxysmal atrial flutter, anticoagulated with apixaban, on propafenone for rhythm control, found to be in atrial fibrillation on pacemaker interrogation after she called cardiology clinic complaining of fatigue.     PROCEDURE:  The patient arrived at the Echo Laboratory in a fasting, non-sedated state.  Informed consent was previously obtained from patient, who understood indications, risks, and benefits of the procedure.  She reported having taken her apixaban without missing any doses for at least the last month.  With help from Anesthesia, patient was deeply sedated.  200J of synchronized biphasic shock was delivered through the cardiac monitor, and the patient was successfully converted to an atrial-paced rhythm.  After sedation wore off, patient awoke and remained neurologically intact.  The patient tolerated the procedure well from a hemodynamic and respiratory standpoint without any complications.  She was observed in the Echo Laboratory and then discharged to home after sedation wore off and he was ambulatory.    IMPRESSION:  1.  Successful direct current cardioversion with 200J biphasic shock.    RECOMMENDATIONS/PLANS:  1.   Follow-up with Dr. Emeterio Loza in cardiology clinic  2.   Continue apixaban without interruption for at least 4 weeks    Dr. Marlena Blackwood was present throughout the entire procedure.    Ivan Valencia MD  Cardiovascular disease fellow      I was present during the entire procedure.    Marlena Blackwood MD, MS  EP/Cardiology Staff

## 2018-09-14 NOTE — ANESTHESIA POSTPROCEDURE EVALUATION
Patient: Linda Spicer    Procedure(s):      Diagnosis:atrial fibrulation  Diagnosis Additional Information: No value filed.    Anesthesia Type:  No value filed.    Note:  Anesthesia Post Evaluation    Patient location during evaluation: Bedside  Patient participation: Unable to evaluate secondary to administered sedation  Level of consciousness: sleepy but conscious  Pain management: adequate  Airway patency: patent  Cardiovascular status: acceptable  Respiratory status: acceptable  Hydration status: acceptable  PONV: none             Last vitals:  Vitals:    09/14/18 1435 09/14/18 1630 09/14/18 1645   BP: 165/70 176/72 189/69   Pulse: 64 60 56   Resp: 24 16 16   Temp: 36.7  C (98  F)     SpO2: 99% 98%          Electronically Signed By: Jake Jennings MD  September 14, 2018  5:11 PM

## 2018-09-14 NOTE — ANESTHESIA CARE TRANSFER NOTE
Patient: Linda Spicer    Procedure(s):      Diagnosis: atrial fibrulation  Diagnosis Additional Information: No value filed.    Anesthesia Type:   No value filed.     Note:    Patient transferred to:PACU (ECHO Recovery )  Comments: Pt remains stable, monitors on alarms in place, exchanging well report to ECHO RN, no complicationsHandoff Report: Identifed the Patient, Identified the Reponsible Provider, Reviewed the pertinent medical history, Discussed the surgical course, Reviewed Intra-OP anesthesia mangement and issues during anesthesia, Set expectations for post-procedure period and Allowed opportunity for questions and acknowledgement of understanding      Vitals: (Last set prior to Anesthesia Care Transfer)    CRNA VITALS  9/14/2018 1542 - 9/14/2018 1612      9/14/2018             Resp Rate (observed): 12                Electronically Signed By: SUNNI Rogers CRNA  September 14, 2018  4:12 PM

## 2018-09-14 NOTE — PROGRESS NOTES
Prepped for CV.  Denies pain but states she has much fatigue & some shortness of breath.  Has been exhausted & feeling extremely tired since end of August.  Spouse of 68 years  2018 & is teary at times.  Son with her.  H & P needs update for sedation.  Labs complete.  Consent will be obtained in echo department.

## 2018-09-14 NOTE — PROGRESS NOTES
Returned from ECHO s/p Cardioversion.  BP elevated, but per patient, she had not take her noon anti-hypertensive.  Per patient, her  recently  in last 10 days, and she attributes some hypertension to this as well.  OVSS.  Denies pain.  Tolerated food, fluids, urination and ambulation all without issues.  Reviewed discharge instructions with son.  Discharged to home with son.

## 2018-09-14 NOTE — ANESTHESIA PREPROCEDURE EVALUATION
Anesthesia Evaluation     .             ROS/MED HX    ENT/Pulmonary: Comment: Interstitial lung disease      Neurologic:  - neg neurologic ROS     Cardiovascular: Comment: A flutter since 08/28    (+) hypertension----. : . . . pacemaker :. dysrhythmias a-flutter, .       METS/Exercise Tolerance:     Hematologic:     (+) Anemia, History of Transfusion -      Musculoskeletal: Comment: DDD - lumbar  osteoporosis        GI/Hepatic: Comment: Irritable bowel syndrome        Renal/Genitourinary: Comment: Stress incontinence    (+) chronic renal disease, type: CRI,       Endo:     (+) thyroid problem hypothyroidism, .      Psychiatric:  - neg psychiatric ROS       Infectious Disease:         Malignancy:         Other:                     Physical Exam  Normal systems: pulmonary and dental    Airway   Mallampati: III  TM distance: >3 FB  Neck ROM: full    Dental     Cardiovascular   Rhythm and rate: irregular      Pulmonary                     Anesthesia Plan      History & Physical Review  History and physical reviewed and following examination; no interval change.    ASA Status:  3 .    NPO Status:  > 8 hours    Plan for MAC with Intravenous induction. Maintenance will be Balanced.  Reason for MAC:  Deep or markedly invasive procedure (G8)         Postoperative Care  Postoperative pain management:  IV analgesics.      Consents  Anesthetic plan, risks, benefits and alternatives discussed with:  Patient..        Procedure: Procedure(s):   - Wound Class:     HPI: Linda Spicer is a 87 year old female scheduled for cardioversion for a flutter.  Patient has a pacermaker which was interrogated this AM.  Plan for methohexital IV prior to cardioversion.  Standard ASA monitors, single IV.    PMHx/PSHx:  Past Medical History:   Diagnosis Date     Adjustment disorder with anxious mood 5/18/2015     Advanced directives, counseling/discussion 8/30/2012    Patient states has Advance Directive and will bring in a copy to clinic.  8/30/2012   [facility] Medical Assistant \       Anemia 9/25/2015     Basal cell cancer      CHF (congestive heart failure) (H) 9/18/2014     CKD (chronic kidney disease) stage 3, GFR 30-59 ml/min 9/29/2015     DDD (degenerative disc disease), lumbar 9/25/2015     Diffuse idiopathic pulmonary fibrosis (H) 5/6/2013     Encounter for palliative care 5/18/2015     History of blood transfusion 9/29/2015     Hypertension goal BP (blood pressure) < 140/90 9/7/2012     Hypothyroid 9/7/2012     Irritable bowel syndrome 10/29/2013     Macular degeneration      Macular degeneration, 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     Nondisplaced spiral fracture of shaft of humerus      Osteoporosis 8/13/2013     Imo Update utility     RA (rheumatoid arthritis) (H) 5/7/2013     Rheumatic fever      Shingles      Spinal stenosis of lumbar region with neurogenic claudication 9/14/2015       Past Surgical History:   Procedure Laterality Date     APPENDECTOMY       BIOPSY      hemorrhoidectomy     ENT SURGERY      tonsillectomy     GYN SURGERY      3 D & C's     HYSTERECTOMY, PAP NO LONGER INDICATED       LAMINECTOMY LUMBAR ONE LEVEL N/A 10/13/2015    Procedure: LAMINECTOMY LUMBAR ONE LEVEL;  Surgeon: Fransico Toussaint MD;  Location: [facility] OR         Current Facility-Administered Medications on File Prior to Encounter:  DOBUTamine 500 mg in dextrose 5% 250 mL (adult std)   DOBUTamine 500 mg in dextrose 5% 250 mL (adult std)     Current Outpatient Prescriptions on File Prior to Encounter:  ACETAMINOPHEN PO Take 1,000 mg by mouth 2 times daily as needed    apixaban ANTICOAGULANT (ELIQUIS) 2.5 MG tablet Take 1 tablet (2.5 mg) by mouth 2 times daily   Artificial Tear Ointment (REFRESH P.M.) OINT Apply  to eye. Daily at bedtime    Ascorbic Acid (VITAMIN C PO) Take 500 mg by mouth daily    azaTHIOprine (IMURAN) 50 MG tablet Take 1 tablet (50 mg) by mouth daily   Blood Pressure Monitor KIT 1 kit daily as needed   calcium-vitamin D (CALTRATE) 600-400  MG-UNIT per tablet Take 1 tablet by mouth 2 times daily    cetirizine (ZYRTEC ALLERGY) 10 MG tablet Take 10 mg by mouth At Bedtime   COMPOUNDED NON-CONTROLLED SUBSTANCE (CMPD RX) - PHARMACY TO MIX COMPOUNDED MEDICATION Estriol 1mg/gram. Place 1 gram vaginally daily for two weeks, then vaginally twice weekly after.   fish oil-omega-3 fatty acids (FISH OIL) 1000 MG capsule Take 2 g by mouth daily. 2 capsules daily    folic acid (FOLVITE) 1 MG tablet Take 1 tablet (1 mg) by mouth daily   hydrochlorothiazide (HYDRODIURIL) 25 MG tablet Take 1 tablet (25 mg) by mouth daily   Hypromellose (GENTEAL MILD OP) Apply  to eye daily.   levothyroxine (SYNTHROID/LEVOTHROID) 75 MCG tablet TAKE ONE TABLET BY MOUTH ONCE DAILY   loratadine (CLARITIN) 10 MG tablet Take 10 mg by mouth every morning   losartan (COZAAR) 50 MG tablet Take 1 tablet (50 mg) by mouth 2 times daily   metoprolol succinate (TOPROL-XL) 25 MG 24 hr tablet Take 1 tablet (25 mg) by mouth daily   mirtazapine (REMERON) 15 MG tablet Take 1 tablet (15 mg) by mouth At Bedtime   Multiple Vitamins-Minerals (PRESERVISION AREDS PO) Take 1 tablet by mouth daily    order for DME Dispense SP Walker-small   propafenone (RYTHMOL) 150 MG TABS tablet Take 1 tablet (150 mg) by mouth 3 times daily   ranibizumab (LUCENTIS) 0.3 MG/0.05ML SOLN 0.3 mg by Intravitreal route Every 6 weeks   ranitidine (ZANTAC) 150 MG tablet Take 1 tablet (150 mg) by mouth 2 times daily   senna-docusate (SENOKOT-S;PERICOLACE) 8.6-50 MG per tablet Take 2 tablets by mouth At Bedtime Hold if diarrhea occurs.   albuterol (2.5 MG/3ML) 0.083% neb solution Take 1 vial by nebulization every 6 hours as needed for shortness of breath / dyspnea or wheezing   hydrocortisone 2.5 % cream Apply BID to affected region(s) for 7-10 days.   nitroglycerin (NITROSTAT) 0.4 MG SL tablet Place 1 tablet (0.4 mg) under the tongue every 5 minutes as needed for chest pain   order for DME Equipment being ordered: Dispense baffle, for  use with nebulizer.   order for DME Equipment being ordered: Nebulizer. Use with Albuterol.   order for DME Equipment being ordered: Dispense face mask.Mrs. Spicer is immunosuppressed due to rheumatoid arthritis.   saccharomyces boulardii (FLORASTOR) 250 MG capsule Take 250 mg by mouth daily   STATIN NOT PRESCRIBED, INTENTIONAL, Please choose reason not prescribed, below       Social Hx:   Social History   Substance Use Topics     Smoking status: Never Smoker     Smokeless tobacco: Never Used     Alcohol use Yes      Comment: rare wine        Allergies:   Allergies   Allergen Reactions     Cephalexin Hcl Diarrhea     Gabapentin Other (See Comments)     Dizzsiness     Naproxen GI Disturbance     Perfume      Lactase Other (See Comments)     Macrobid [Nitrofurantoin Anhydrous]      Possibly related to lung disease      Seasonal Allergies      Sulfa Drugs      Throat swelling     Xanax [Alprazolam] Other (See Comments)     Dizziness      Ciprofloxacin Itching and Rash         NPO Status: Per ASA Guidelines    Labs:    Blood Bank:  Lab Results   Component Value Date    ABO O 09/29/2015    RH  Pos 09/29/2015    AS Neg 09/29/2015     BMP:  Recent Labs   Lab Test  09/14/18   1337  08/15/18   0902  08/09/18   0958   NA   --    --   134   POTASSIUM  4.7   --   5.5*   CHLORIDE   --    --   101   CO2   --    --   26   BUN   --    --   46*   CR   --   1.27*  1.38*   GLC   --    --   102*   FATOUMATA   --    --   8.8     CBC:   Recent Labs   Lab Test  08/15/18   0902   WBC  5.7   RBC  3.19*   HGB  10.6*   HCT  31.4*   MCV  98   MCH  33.2*   MCHC  33.8   RDW  12.9   PLT  254     Coags:  Recent Labs   Lab Test  09/29/15   1253   INR  1.13       Jake Jennings MD  Staff Anesthesiologist  [phone]

## 2018-09-17 ENCOUNTER — PATIENT OUTREACH (OUTPATIENT)
Dept: CARE COORDINATION | Facility: CLINIC | Age: 83
End: 2018-09-17

## 2018-09-17 ENCOUNTER — OFFICE VISIT (OUTPATIENT)
Dept: CARDIOLOGY | Facility: CLINIC | Age: 83
End: 2018-09-17
Payer: MEDICARE

## 2018-09-17 ENCOUNTER — ALLIED HEALTH/NURSE VISIT (OUTPATIENT)
Dept: CARDIOLOGY | Facility: CLINIC | Age: 83
End: 2018-09-17
Attending: INTERNAL MEDICINE
Payer: MEDICARE

## 2018-09-17 VITALS
BODY MASS INDEX: 28.17 KG/M2 | WEIGHT: 159 LBS | OXYGEN SATURATION: 98 % | SYSTOLIC BLOOD PRESSURE: 113 MMHG | HEIGHT: 63 IN | RESPIRATION RATE: 18 BRPM | DIASTOLIC BLOOD PRESSURE: 70 MMHG | HEART RATE: 65 BPM

## 2018-09-17 DIAGNOSIS — I49.5 SICK SINUS SYNDROME (H): Primary | ICD-10-CM

## 2018-09-17 DIAGNOSIS — I48.0 PAROXYSMAL ATRIAL FIBRILLATION (H): Primary | ICD-10-CM

## 2018-09-17 LAB
INTERPRETATION ECG - MUSE: NORMAL
INTERPRETATION ECG - MUSE: NORMAL

## 2018-09-17 PROCEDURE — 93296 REM INTERROG EVL PM/IDS: CPT | Mod: ZF

## 2018-09-17 PROCEDURE — 93294 REM INTERROG EVL PM/LDLS PM: CPT | Performed by: INTERNAL MEDICINE

## 2018-09-17 PROCEDURE — 93000 ELECTROCARDIOGRAM COMPLETE: CPT | Performed by: INTERNAL MEDICINE

## 2018-09-17 PROCEDURE — 99215 OFFICE O/P EST HI 40 MIN: CPT | Performed by: INTERNAL MEDICINE

## 2018-09-17 NOTE — PATIENT INSTRUCTIONS
It was a pleasure to see you in the cardiology clinic today.    If you have any questions, you can reach my nurse, Becky Jimenez, at [phone].  Press Option #1 for the [facility], and then press Option #3 for nursing.    Note the new medications: Multaq 400 mg, twice a day    Stop the following medications: Rhythmol (Propofenone) after you receive the Multaq. You will wait 72 hours before starting the Multaq    The results from today include: None    Tests ordered today: None    I would like to schedule you for another cardioversion in 1 week after taking Multaq  I would like you to follow up with Dr. Loza in 4-6 weeks.    Sincerely,      Emeterio Loza MD     [facility]

## 2018-09-17 NOTE — PROGRESS NOTES
CARDIOLOGY CLINIC FOLLOW UP    Referring Provider:  Established Patient  Primary Care Provider:   Marianne Basurto    HPI: Linda Spicer is a 87 year old female who returns to the cardiology clinic for ongoing evaluation of HFpEF and recent paroxysmal atrial flutter.  The patient's risk factor profile is: (+) HTN, (-) diabetes, (-) hyperlipidemia, (-) tobacco use, (+) family Hx CAD [mother, sister]. The patient was diagnosed with HFpEF in Oct 2014.  She had a dobutamine ECHO (Oct 2014) that was normal.  She had a RHC in Oct 2014 that showed normal hemodynamics at baseline although the CO was mildly depressed.  With fluid challenge, the left sided filling pressures dramatically increased. The results of her prior dobutamine ECHO and RHC are noted below; she has not undergone coronary angiography.    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 She had PFTs in Aug 2015 that showed moderate lung diffusion defect.  She did not desaturate after walking 6 minutes.  Inhalers have not provided any benefit.   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  She has been recuperating gradually.    She presented in August 2016 with chest pain, dizziness, and lightheadedness.  She was in atrial flutter at that time but converted back to NSR while in the ER.  She was started on metoprolol XL 12.5 mg daily, losartan was decreased to 50 mg daily, and spironolactone 25 mg qd was continued.  Her ECG did not show ischemic changes and her troponins were negative.  She was treated on heparin but anticoagulation was deferred and she was given ASA 81 mg qd.  She was switched from ASA to Eliquis in Nov 2016.    In Feb 2017 she was started to have recurrent atrial flutter.  She was treated with higher dose of beta blocker but developed bradycardia and near syncope and her beta blocker was stopped.   She returned to [facility] with tachycardia and a pacemaker was placed and she was restarted on Toprol XL 12.5 mg qd.  Her losartan was decreased to 25 mg qd and her spironolactone was continued at 25 mg qd.  She was started on Propofenone at that time.    She was seen at [facility] ER in July 2017 with chest pain.  It had been occurring in the setting of resuming an exercise program and the left sided chest pain was attributed to musculoskeletal pain. ECG showed atrial pacing.    .  Troponin < 0.045. CXR negative.    She returned to the ER in Aug 2017 after mixing up her medications.  Her Toprol XL had been increased to 25 mg qd but she had accidentally taken Losartan 50 mg qd instead of 25 mg qd.  She noticed HR had increased to 100-110 and she had lightheadedness.  She was returned to Toprol XL 12.5 mg qd and Losartan 25 mg qHS, and Spironolactone 25 mg qd.    She had recurrent ER visits in Nov 2017, Jan 2018, and most recently Feb 2018.  She felt palpitations, lightheadedness, and shortness of breath.  She believes she was in atrial flutter on each occasion. Her pacemaker was interrogated on 2/21/18.  Data shows 5 AT/AF episodes indicating atrial arrhythmias. This equals 7% of the time since 1/15/18. Longest episodes listed is 3 days with atrial rates up to >400bpm. Current episode appears to have started today, 2/21/18, at 0517. Vrates during AT/AF are <110bpm about 95%. Patient has a hx of PAF.  Data shows 3 non-sustained VT episodes, 1-3sec, Vrates 162-175bpm, all episodes were 1/24/18@1044 and were actually the same episode of NSVT lasting ~6 sec. Atrial pacing at 72.1%.  Ventricular pacing at 5.1%.  Presenting rhythm is AS- (AF).    She was discharged from the ER on Apixaban 2.5 BID and Toprol XL 25 mg qd.  Her home BP have been 116-192/58-93 mm Hg and HR 60 - 66.  Her Losartan, previously 25 mg qAM, was switch to 50 mg qHS.      She was admitted to [facility] in June 2018 with fractured coccyx following a fall and  "was in atrial fibrillation at that time by her report. She was subseqeuntly started on Rhythmol and was cardioverted and continued on Rhythmol (July 2018).    She was seen by Dr. Comer in Feb 2018.  The six-minute walk distance (600 ft) is reduced. Her O2 saturation was 96%.  Mild restriction defect.  Mild diffusion defect. The diffusing capacity was not corrected for the patient's hemoglobin.  She remains compliant with her medications.  Of note, she had ABP with pH 7.50 and pCO2 30 confirming hyperventilation in 2014.    She had a fall in July 2018 after experiencing a fall with fractured S3.  She has been using a walker to ambulate and a wheelchair when necessary.      She presented to [facility] on Sept 13, 2018 with exhaustion.  She had SOB but this was unchanged from baseline.  She suspected she was back in AF so she contacted the EP nurses. Pacemaker interrogation showed presenting rhythm AF/ @ 60 bpm. Normal device function. AP= 0% and = 81%. No short V-V intervals recorded.  She underwent elective cardioversion by EP and was discharged to home that day.    She believes she was feeling better on 9/15, less fatigued, \"a new person\".   Yesterday, she awoke, feeling worse, and suspected she was in AF.  She had repeat interrogation of the pacer, confirming AF.    The patient continues to experience SOB when she speaks and she begins to hyperventilate.  She also notes RUSSELL with walking and she has to pace herself.  The symptoms begin within seconds of activity.  She denies PND, orthopnea, pedal edema.  She denies palpitations.  The lightheadedness has improved since the last visit.  No syncope.     Her BPs were previously elevated in the 120-130 / 60 - 70 mm Hg.  Her BPs have been reasonably well controlled.    PAST MEDICAL HISTORY:  Past Medical History:   Diagnosis Date     Adjustment disorder with anxious mood 5/18/2015     Advanced directives, counseling/discussion 8/30/2012    Patient states has Advance " Directive and will bring in a copy to clinic. 8/30/2012   Tevin May  [facility] Medical Assistant \       Anemia 9/25/2015     Basal cell cancer      CHF (congestive heart failure) (H) 9/18/2014     CKD (chronic kidney disease) stage 3, GFR 30-59 ml/min 9/29/2015     DDD (degenerative disc disease), lumbar 9/25/2015     Diffuse idiopathic pulmonary fibrosis (H) 5/6/2013     Encounter for palliative care 5/18/2015     History of blood transfusion 9/29/2015     Hypertension goal BP (blood pressure) < 140/90 9/7/2012     Hypothyroid 9/7/2012     Irritable bowel syndrome 10/29/2013     Macular degeneration      Macular degeneration, 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     Nondisplaced spiral fracture of shaft of humerus      Osteoporosis 8/13/2013     Imo Update utility     RA (rheumatoid arthritis) (H) 5/7/2013     Rheumatic fever      Shingles      Spinal stenosis of lumbar region with neurogenic claudication 9/14/2015       CURRENT MEDICATIONS:  Current Outpatient Prescriptions   Medication Sig Dispense Refill     ACETAMINOPHEN PO Take 1,000 mg by mouth 2 times daily as needed        albuterol (2.5 MG/3ML) 0.083% neb solution Take 1 vial by nebulization every 6 hours as needed for shortness of breath / dyspnea or wheezing       Artificial Tear Ointment (REFRESH P.M.) OINT Apply  to eye. Daily at bedtime          Ascorbic Acid (VITAMIN C PO) Take 500 mg by mouth daily        azaTHIOprine (IMURAN) 50 MG tablet Take 1 tablet (50 mg) by mouth daily 90 tablet 2     Blood Pressure Monitor KIT 1 kit daily as needed 1 kit 0     calcium-vitamin D (CALTRATE) 600-400 MG-UNIT per tablet Take 1 tablet by mouth 2 times daily        cetirizine (ZYRTEC ALLERGY) 10 MG tablet Take 10 mg by mouth At Bedtime       COMPOUNDED NON-CONTROLLED SUBSTANCE (CMPD RX) - PHARMACY TO MIX COMPOUNDED MEDICATION Estriol 1mg/gram. Place 1 gram vaginally daily for two weeks, then vaginally twice weekly after. 30 g 6     fish oil-omega-3 fatty acids (FISH OIL) 1000 MG  capsule Take 2 g by mouth daily. 2 capsules daily            folic acid (FOLVITE) 1 MG tablet Take 1 tablet (1 mg) by mouth daily 90 tablet 3     hydrochlorothiazide (HYDRODIURIL) 25 MG tablet Take 1 tablet (25 mg) by mouth daily 30 tablet 5     hydrocortisone 2.5 % cream Apply BID to affected region(s) for 7-10 days. 30 g 0     Hypromellose (GENTEAL MILD OP) Apply  to eye daily.       loratadine (CLARITIN) 10 MG tablet Take 10 mg by mouth every morning       losartan (COZAAR) 50 MG tablet Take 1 tablet (50 mg) by mouth 2 times daily 180 tablet 1     metoprolol succinate (TOPROL-XL) 25 MG 24 hr tablet Take 1 tablet (25 mg) by mouth daily 90 tablet 3     mirtazapine (REMERON) 15 MG tablet Take 1 tablet (15 mg) by mouth At Bedtime 30 tablet 1     Multiple Vitamins-Minerals (PRESERVISION AREDS PO) Take 1 tablet by mouth daily        nitroglycerin (NITROSTAT) 0.4 MG SL tablet Place 1 tablet (0.4 mg) under the tongue every 5 minutes as needed for chest pain 25 tablet 0     order for DME Dispense SP Walker-small 1 each 0     order for DME Equipment being ordered: Dispense baffle, for use with nebulizer. 1 each 0     order for DME Equipment being ordered: Nebulizer. Use with Albuterol. 1 each 0     order for DME Equipment being ordered: Dispense face mask.  Mrs. Spicer is immunosuppressed due to rheumatoid arthritis. 1 Box 11     propafenone (RYTHMOL) 150 MG TABS tablet Take 1 tablet (150 mg) by mouth 3 times daily 90 tablet 3     ranibizumab (LUCENTIS) 0.3 MG/0.05ML SOLN 0.3 mg by Intravitreal route Every 6 weeks       ranitidine (ZANTAC) 150 MG tablet Take 1 tablet (150 mg) by mouth 2 times daily 60 tablet 5     saccharomyces boulardii (FLORASTOR) 250 MG capsule Take 250 mg by mouth daily       STATIN NOT PRESCRIBED, INTENTIONAL, Please choose reason not prescribed, below       apixaban ANTICOAGULANT (ELIQUIS) 2.5 MG tablet Take 1 tablet (2.5 mg) by mouth 2 times daily 180 tablet 3     levothyroxine (SYNTHROID/LEVOTHROID)  75 MCG tablet TAKE ONE TABLET BY MOUTH ONCE DAILY 90 tablet 1     senna-docusate (SENOKOT-S;PERICOLACE) 8.6-50 MG per tablet Take 2 tablets by mouth At Bedtime Hold if diarrhea occurs. 120 tablet 11       PAST SURGICAL HISTORY:  Past Surgical History:   Procedure Laterality Date     ANESTHESIA CARDIOVERSION N/A 9/14/2018    Procedure: ANESTHESIA CARDIOVERSION;;  Surgeon: GENERIC ANESTHESIA PROVIDER;  Location: [facility] OR     APPENDECTOMY       BIOPSY      hemorrhoidectomy     ENT SURGERY      tonsillectomy     GYN SURGERY      3 D & C's     HYSTERECTOMY, PAP NO LONGER INDICATED       LAMINECTOMY LUMBAR ONE LEVEL N/A 10/13/2015    Procedure: LAMINECTOMY LUMBAR ONE LEVEL;  Surgeon: Fransico Toussaint MD;  Location: [facility] OR       ALLERGIES  Cephalexin hcl; Gabapentin; Naproxen; Perfume; Lactase; Macrobid [nitrofurantoin anhydrous]; Seasonal allergies; Sulfa drugs; Xanax [alprazolam]; and Ciprofloxacin    FAMILY HX:  Family History   Problem Relation Age of Onset     Hypertension Mother      Psychotic Disorder Father      Diabetes Son      Diabetes Daughter      Blood Disease Daughter        SOCIAL HX:  History     Social History     Marital Status:      Spouse Name: N/A     Number of Children: N/A     Years of Education: N/A     Social History Main Topics     Smoking status: Never Smoker      Smokeless tobacco: Never Used     Alcohol Use: Yes      Comment: rare wine      Drug Use: No     Sexual Activity: No     Other Topics Concern     None     Social History Narrative    . Has six children. She enjoys bridge and genealogy. Her  has cancer. Her son has financial and alcohol issues.       ROS:  Constitutional: No fever, chills, or sweats. No weight gain/loss.   HEENT: No visual disturbance, ear ache, epistaxis, sore throat.   Allergies/Immunologic: Negative.   Respiratory:(-) cough, (-) hemoptysis.   Cardiovascular: As per HPI.   GI: No nausea, vomiting, hematemesis, melena, or hematochezia.   : No  "urinary frequency, dysuria, or hematuria.   Integument: No rash.   Psychiatric: No anxiety / depression.   Neuro: No speech disturbance, focal sensory or motor deficit.   Endocrinology: No polyuria / polyphagia.   Musculoskeletal: No myalgia.    VITAL SIGNS:  /70  Pulse 65  Resp 18  Ht 1.6 m (5' 3\")  Wt 72.1 kg (159 lb)  LMP  (LMP Unknown)  SpO2 98%  BMI 28.17 kg/m2  LMP  (LMP Unknown)  Body mass index is 28.17 kg/(m^2).  Wt Readings from Last 2 Encounters:   18 71.5 kg (157 lb 9.6 oz)   18 76.8 kg (169 lb 4.8 oz)       PHYSICAL EXAM  Linda Spicer is a 87 year old female.in no acute distress.  HEENT: Eyes Nonicteric.  Neck: JVP normal.  Carotids +3/3 bilaterally without bruits.  Lungs: Fine, dry, bilateral crackles about 1/2 the way up, unchanged. Cor RRR. Normal S1 and S2.  No murmur, rub, or gallop.  PMI in Lf 5th ICS.  Abd: Soft, nontender, nondistended.  NABS.  No pulsatile mass.  Extremities: No C/C.  Trace edema, Rt > Lf.  Pulses +3/3 symmetric in upper and lower extremities.  Neuro: Grossly intact.  Psych: A&O x 3.  Skin: No rash.    LABS  None    Recent Labs   Lab Test  17   1214  16   0756  14   0821   CHOL  146  171  155   HDL  50  47*  42*   LDL  76  109*  97   TRIG  101  75  84   CHOLHDLRATIO   --    --   3.7     EK18  Atrial fibrillation, mostly vent paced rate 60.  One  supraventricular beat conducts through AV node system.    EK18  Atrial paced rhythm, prolonged conduction.  Nonspecific ST-T wave changes.    EK16  SB at 54.  Intervals: 0.17/0.076/0.404.  Isolated Q wave in III.  No other abnormalities.    EK/7/15  SB at 49.  Q waves in III and aVF.  No change from ECG () by report but I am not able to locate this or any other ECGs.    ECHO: None    DOBUTAMINE ECHO STRESS TEST:  14  Interpretation Summary  The EKG portion of this stress test was negative for inducible ischemia (see echo results below). Normal resting " wall motion and no stress-induced wall motion abnormality.    Baseline  Normal baseline electrocardiogram.  Normal left ventricular wall motion.    Stress  The maximum dose of dobutamine was 20mcg/kg/min.  Target Heart Rate was achieved.  The EKG portion of this stress test was negative for inducible ischemia (see echo results below).  Arrhythmia induced during stress: occasional PVC's and PAC's.  Normal resting wall motion and no stress-induced wall motion abnormality.    Left Ventricle  There is mild concentric left ventricular hypertrophy.  Left ventricular systolic function is normal.    Right Ventricle  The right ventricle is normal in size and function.    Atria  The left atrium is moderate to severely dilated.    Mitral Valve  There is mild (1+) mitral regurgitation.    Tricuspid Valve  There is mild (1+) tricuspid regurgitation.  The right ventricular systolic pressure is approximated at 39 mmHg plus the   right atrial pressure.    Aortic Valve  There is trace aortic regurgitation.    Pericardial/Pleural  There is no pericardial effusion.      RIGHT HEART CATH: 9/22/14  RA 3  RV 36/3  PA 36/13 (mean 21)  PCW 10  Fluid challenge 500 ml NS --> PA 46/18 (mean 27 mm Hg), PCW 18 with prominent v waves    ECHO: 5/26/2016   1.  LV function is normal. The visually estimated ejection fraction is 65%.   2. No regional wall motion abnormalities.   3. Moderately enlarged right ventricle with mild decreased RV functions.   4. Diastolic filling consistent with diastolic dysfunction.   5. Moderate mitral valve regurgitation.   6. Moderately dilated left atrium.   7. Pulmonary hypertension is present.   8. Moderately elevated pulmonary pressure estimated at 40.0 mmHg plus right atrial pressure.    LEXISCAN:  5/27/16  1. Typical Lexiscan induced chest discomfort.  2. Typical symptoms with Lexiscan infusion; probable adequate vasodilation response.  3. Normal stress EKG.  4. Abnormal baseline blood pressure 207/71.  5.  Ejection fraction 76%.  6. No discrete regional wall motion abnormalities are identified.  7. Normal myocardial perfusion with no evidence for focal myocardial ischemia or myocardial infarction.      PFTs  (8/21/15)  The six-minute walk distance is normal.  There is no significant desaturation during the six-minute walk done on room air.  No significant airflow obstruction.  Moderate Diffusion Defect   Compared with testing from 1/15/2015 there has been an increase in the FVC.    CORONARY ANGIOGRAPHY:  None    ARTERIAL US LLE:  12/2/17  No evidence of significant arterial occlusive disease in the left leg.    CT chest w/o contrast (2/20/2017)  IMPRESSION:   Interval increase in peripheral lower lobe predominant fibrotic  changes with traction bronchiectasis in a UIP pattern. Redemonstrated  honeycombing with interval enlargement of cystic change.      ASSESSMENT AND PLAN:   1. Heart Failure, preserved LVEF.  The patient has significant RUSSELL, occurring with minimal activities. A component of this may be related to HFpEF.  Previously, I performed RHC and the left sided filling pressures were normal.  She tried a low dose diuretic without impact.  She continues on Losartan 25 BID and Toprol XL 25 mg qAM.   Switch Losartan to 50 every day.  We will monitor for worsening pulmonary symptoms on the beta blocker  Continue with light exercise. Avoid added salt and processed foods.  Of note, the RHC essentially ruled out pulmonary artery hypertension but that was 3 years ago.      2. Atrial Flutter, Paroxysmal.  I would equate paroxysmal atrial flutter to PAF in regard to risk of stroke or systemic embolism.   The patient has a CHADS2-Vasc score of 4, corresponding to a 4.0% annual stroke / systemic emolism event rate. The patient has Stage III CKD with GFR 39 - 42 ml/min. She is on Propofenone 150 TID.  She is on Eliquis 2.5 mg BID given the borderline Cr and the age > 80 yrs.  She has symptomatic atrial flutter /  fibrillation.   The cardioversion on Rhythmol was successful but she returned to Atrial Fibrillation today.  The patient is known to Dr. Blackwood.  Before cardioverting her again, I will stop the Rhythmol, wait 72 hours, and then start Multaq 400 mg BID.  The cardioversion can be scheduled for next week.    3. Rheumatoid Pulmonary Fibrosis.  Per Dr. Comer, she had severe dyspnea and hyperventilation in the past now showing significant improvement in ventilatory control and symptoms and improvement in pulmonary function and exercise tolerance.  There was a concern that patient was destaturating but she did not do so on 6 minute walk test.  She is up to date on vaccinations and flu shots.  F/U with pulmonary medicine at [place].      4. Hypertension.  Well controlled on combination of Toprol, and Losartan.  Restrict Na.  Sporadic BPs in 160s.  Monitor BP and if persistent SBP> 140, adjust Rx.    5. Hyperventilation.  I think this may explain a significant portion of her symptoms.  It was confirmed on ABG.  Clearly, with minimal effort, as she begins to speak, the symptoms develop.  I would like to try a pulmonary rehabilitation consultation.  I would like to try a course of Ativan for one week, 1 mg BID for one week in the event anxiety is a significant component.Walked through the cooridor and her sats remained 96 or greater.    FOLLOW UP:  Cardioversion, 1 week    Emeterio Loza MD    Divisions of Cardiology  [place], [place]    CC  ESTABLISHED PATIENT

## 2018-09-17 NOTE — NURSING NOTE
Linda Spicer's goals for this visit include:   Chief Complaint   Patient presents with     RECHECK     not feeling well     She requests these members of her care team be copied on today's visit information: PCP  PCP: Tre Lockett    Referring Provider:  No referring provider defined for this encounter.    LMP  (LMP Unknown)    Do you need any medication refills at today's visit? n

## 2018-09-17 NOTE — PROGRESS NOTES
"Preliminary Device Interrogation Results.  Final physician signed paceart report to be scanned and attached.    Patient called the device clinic this morning and spoke with Sena Mooney device tech.  Patient reported that at breakfast yesterday she started feeling poorly and suspected she was back in AFib/Flutter.  Patient notified of interrogation results.  Patient is s/p cardioversion on 9/14/18.  Remote pacemaker transmission received and reviewed. Device transmission sent per MD orders.  Patient has a [employer]tronic dual lead pacemaker.  Normal pacemaker function.  1 AT/AF episode recorded.  AF burden = 46%.  Patient went back into AFib/Flutter on 9/16/18 @ 0642.  Presenting EGM = AFib with  @ 72 bpm.  Patient is taking Eliquis.  AP = 50.1%.   = 36.9%.  Estimated battery longevity to ZENAIDA = 6.5 years.  Device RN called patient this morning and left  requesting patient call back.  Device RN called patient again this evening and spoke with her.  She reports that she is feeling very fatigued and SOB with exertion.  She reports that she had an appt with Dr. Loza today.  Plan per Dr. Loza's note states \"Before cardioverting her again, I will stop the Rhythmol, wait 72 hours, and then start Multaq 400 mg BID.  The cardioversion can be scheduled for next week.\"  Patient encouraged to call Dr. Loza or device clinic with further questions or concerns.    Remote pacemaker transmission            "

## 2018-09-17 NOTE — MR AVS SNAPSHOT
After Visit Summary   9/17/2018    Linda Spicer    MRN: [id number]           Patient Information     Date Of Birth          6/13/1931        Visit Information        Provider Department      9/17/2018 12:30 PM Emeterio Loza MD [facility]        Today's Diagnoses     Paroxysmal atrial fibrillation (H)    -  1      Care Instructions      It was a pleasure to see you in the cardiology clinic today.    If you have any questions, you can reach my nurse, Becky Jimenez, at [phone].  Press Option #1 for the [facility], and then press Option #3 for nursing.    Note the new medications: Multaq 400 mg, twice a day    Stop the following medications: Rhythmol (Propofenone) after you receive the Multaq. You will wait 72 hours before starting the Multaq    The results from today include: None    Tests ordered today: None    I would like to schedule you for another cardioversion in 1 week after taking Multaq  I would like you to follow up with Dr. Loza in 4-6 weeks.    Sincerely,      Emeterio Loza MD     [facility]                Follow-ups after your visit        Your next 10 appointments already scheduled     Oct 11, 2018  9:00 AM CDT   Level 1 with BAY 7 INFUSION   [facility]    [address] [place] [id number]   [phone]            Nov 08, 2018  9:30 AM CST   Level 1 with BAY 9 INFUSION   [facility]    [address] [facility] [address]   [phone]            Dec 11, 2018 10:00 AM CST   Office Visit with PFT LAB   [facility]    [address] [facility] [address]   [phone]           Bring a current list of meds and any records pertaining to this visit. For Physicals, please bring immunization records and any forms needing  to be filled out. Please arrive 10 minutes early to complete paperwork.            Dec 11, 2018 11:00 AM CST   [facility]   [facility]    [address] [place] [id number] [phone]            Jim 15, 2019  2:40 PM CST   [facility]    [address] [place] [address]   [phone]            Feb 18, 2019  2:10 PM CST   [facility] Visit with Emeterio Loza MD   [facility]    [address] [place] [address] [phone]            Mar 01, 2019 10:00 AM CST   Return Visit with Asia Steven PA-C   [facility]    [address] [place] [id number] [phone]              Who to contact     If you have questions or need follow up information about today's clinic visit or your schedule please contact [facility] directly at [phone].  Normal or non-critical lab and imaging results will be communicated to you by MyChart, letter or phone within 4 business days after the clinic has received the results. If you do not hear from us within 7 days, please contact the clinic through MyChart or phone. If you have a critical or abnormal lab result, we will notify you by phone as soon as possible.  Submit refill requests through [employer] or call your pharmacy and they will forward the refill request to us. Please allow 3 business days for your refill to be completed.          Additional Information About Your Visit        [employer] Information     [employer] gives you secure access to your electronic health record. If you see a primary care provider, you can also send messages to your care team and make appointments. If you have questions, please call your primary care clinic.  If  "you do not have a primary care provider, please call [phone] and they will assist you.      [employer] is an electronic gateway that provides easy, online access to your medical records. With [employer], you can request a clinic appointment, read your test results, renew a prescription or communicate with your care team.     To access your existing account, please contact your [facility] or call [phone] for assistance.        Care EveryWhere ID     This is your Care EveryWhere ID. This could be used by other organizations to access your [facility] medical records  [id number]        Your Vitals Were     Pulse Respirations Height Last Period Pulse Oximetry BMI (Body Mass Index)    65 18 1.6 m (5' 3\") (LMP Unknown) 98% 28.17 kg/m2       Blood Pressure from Last 3 Encounters:   09/17/18 113/70   09/14/18 189/69   09/14/18 163/77    Weight from Last 3 Encounters:   09/17/18 72.1 kg (159 lb)   09/14/18 71.5 kg (157 lb 9.6 oz)   09/13/18 76.8 kg (169 lb 4.8 oz)              Today, you had the following     No orders found for display         Today's Medication Changes          These changes are accurate as of 9/17/18  1:16 PM.  If you have any questions, ask your nurse or doctor.               Start taking these medicines.        Dose/Directions    dronedarone 400 MG Tabs tablet   Commonly known as:  MULTAQ   Used for:  Paroxysmal atrial fibrillation (H)   Started by:  Emeterio Loza MD        Dose:  400 mg   Take 1 tablet (400 mg) by mouth 2 times daily (with meals)   Quantity:  60 tablet   Refills:  3         Stop taking these medicines if you haven't already. Please contact your care team if you have questions.     propafenone 150 MG Tabs tablet   Commonly known as:  RYTHMOL   Stopped by:  Emeterio Loza MD                Where to get your medicines      These medications were sent to [facility] [address] [place] [address] - [address], " [place] [facility] [address]     Phone:  [phone]     dronedarone 400 MG Tabs tablet                Primary Care Provider Office Phone # Fax #    Tre Lockett [phone]       [address]  [place] [facility] [address]        Goals        General    #1 Medication  (pt-stated)     Notes - Note created  8/21/2018 10:37 AM by Behl, Melissa K, RN    Goal Statement: I will take my medications as prescribed.  Measure of Success: Patient and home care will report consistently taking medication as prescribed.  Supportive Steps to Achieve: RN CC notified home care RN of medication error.  Barriers: multiple complex chronic health conditions  Strengths: has home care, care coordination and excellent family support  Date to Achieve By: 9/21/18  Patient expressed understanding of goal: yes         #2 I will complete my health care directive. (pt-stated)     Notes - Note edited  8/21/2018 10:39 AM by Behl, Melissa K, RN    Goal Statement: I will complete my health care directive.  Measure of Success: Health care directive will be completed and scanned into chart.  Supportive Steps to Achieve: Patient has attended a health care directive class, 10/24/17 informed of CPR vs intubation  Barriers: is awaiting to see her  to finalize document  Strengths: has care coordination, home care and excellent family support  Date to Achieve By: 12/31/18  Patient expressed understanding of goal: yes               Equal Access to Services     [facility] : Hadii rakesh ku hadasho Soomaali, waaxda luqadaha, qaybta kaalmada adeegyada, waxcameron skinner. So [facility] [phone].    ATENCIÓN: Si habla español, tiene a merchant disposición servicios gratuitos de asistencia lingüística. Llame al [phone].    We comply with applicable federal civil rights laws and [place] laws. We do not discriminate on the basis of race, color, national origin, age, disability, sex, sexual orientation, or gender  identity.            Thank you!     Thank you for choosing [facility]  for your care. Our goal is always to provide you with excellent care. Hearing back from our patients is one way we can continue to improve our services. Please take a few minutes to complete the written survey that you may receive in the mail after your visit with us. Thank you!             Your Updated Medication List - Protect others around you: Learn how to safely use, store and throw away your medicines at www.disposemymeds.org.          This list is accurate as of 9/17/18  1:16 PM.  Always use your most recent med list.                   Brand Name Dispense Instructions for use Diagnosis    ACETAMINOPHEN PO      Take 1,000 mg by mouth 2 times daily as needed        albuterol (2.5 MG/3ML) 0.083% neb solution      Take 1 vial by nebulization every 6 hours as needed for shortness of breath / dyspnea or wheezing        apixaban ANTICOAGULANT 2.5 MG tablet    ELIQUIS    180 tablet    Take 1 tablet (2.5 mg) by mouth 2 times daily    Atrial flutter, paroxysmal (H)       azaTHIOprine 50 MG tablet    IMURAN    90 tablet    Take 1 tablet (50 mg) by mouth daily    Rheumatoid arthritis involving multiple sites with positive rheumatoid factor (H)       Blood Pressure Monitor Kit     1 kit    1 kit daily as needed    CHF (congestive heart failure) (H)       calcium carbonate 600 mg-vitamin D 400 units 600-400 MG-UNIT per tablet    CALTRATE     Take 1 tablet by mouth 2 times daily        COMPOUNDED NON-CONTROLLED SUBSTANCE - PHARMACY TO MIX COMPOUNDED MEDICATION    CMPD RX    30 g    Estriol 1mg/gram. Place 1 gram vaginally daily for two weeks, then vaginally twice weekly after.    Atrophic vaginitis       dronedarone 400 MG Tabs tablet    MULTAQ    60 tablet    Take 1 tablet (400 mg) by mouth 2 times daily (with meals)    Paroxysmal atrial fibrillation (H)       fish oil-omega-3 fatty acids 1000 MG capsule      Take 2 g by mouth daily. 2  capsules daily        folic acid 1 MG tablet    FOLVITE    90 tablet    Take 1 tablet (1 mg) by mouth daily    Oral aphthae       GENTEAL MILD OP      Apply  to eye daily.        hydrochlorothiazide 25 MG tablet    HYDRODIURIL    30 tablet    Take 1 tablet (25 mg) by mouth daily    Hypertension goal BP (blood pressure) < 150/90       hydrocortisone 2.5 % cream     30 g    Apply BID to affected region(s) for 7-10 days.    Rash       levothyroxine 75 MCG tablet    SYNTHROID/LEVOTHROID    90 tablet    TAKE ONE TABLET BY MOUTH ONCE DAILY    Hypothyroidism, unspecified type       loratadine 10 MG tablet    CLARITIN     Take 10 mg by mouth every morning        losartan 50 MG tablet    COZAAR    180 tablet    Take 1 tablet (50 mg) by mouth 2 times daily    Heart failure with preserved ejection fraction, NYHA class I (H), Hypertension goal BP (blood pressure) < 130/80       metoprolol succinate 25 MG 24 hr tablet    TOPROL-XL    90 tablet    Take 1 tablet (25 mg) by mouth daily    Hypertension goal BP (blood pressure) < 140/90       mirtazapine 15 MG tablet    REMERON    30 tablet    Take 1 tablet (15 mg) by mouth At Bedtime    Adjustment insomnia       nitroGLYcerin 0.4 MG sublingual tablet    NITROSTAT    25 tablet    Place 1 tablet (0.4 mg) under the tongue every 5 minutes as needed for chest pain    Chest pain       * order for DME     1 Box    Equipment being ordered: Dispense face mask. Mrs. Spicer is immunosuppressed due to rheumatoid arthritis.    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 Use with Albuterol.    Hypoxia       order for DME     1 each    Equipment being ordered: Dispense baffle, for use with nebulizer.    Pneumonia of left upper lobe due to Mycoplasma pneumoniae       * order for DME     1 each    Dispense SP Walker-small    Pain of toe of right foot, Status post bunionectomy       PRESERVISION  AREDS PO      Take 1 tablet by mouth daily        ranibizumab 0.3 MG/0.05ML Soln    LUCENTIS     0.3 mg by Intravitreal route Every 6 weeks        ranitidine 150 MG tablet    ZANTAC    60 tablet    Take 1 tablet (150 mg) by mouth 2 times daily    Gastroesophageal reflux disease without esophagitis       REFRESH P.M. Oint      Apply  to eye. Daily at bedtime        saccharomyces boulardii 250 MG capsule    FLORASTOR     Take 250 mg by mouth daily        senna-docusate 8.6-50 MG per tablet    SENOKOT-S;PERICOLACE    120 tablet    Take 2 tablets by mouth At Bedtime Hold if diarrhea occurs.    Constipation, chronic       STATIN NOT PRESCRIBED (INTENTIONAL)      Please choose reason not prescribed, below    Hyperlipidemia LDL goal <100       VITAMIN C PO      Take 500 mg by mouth daily        ZYRTEC ALLERGY 10 MG tablet   Generic drug:  cetirizine      Take 10 mg by mouth At Bedtime        * Notice:  This list has 3 medication(s) that are the same as other medications prescribed for you. Read the directions carefully, and ask your doctor or other care provider to review them with you.

## 2018-09-17 NOTE — PROGRESS NOTES
Clinic Care Coordination Contact  Care Team Conversations    RN CC called patient for follow up.  Patient informed writer of being in A-fib again and requiring cardioversion on 9/14/18.  Patient state she felt good up until yesterday and is awaiting response from cardiology team.  Patient has an appointment today at 12:30 pm with cardiology.  Patient requested writer to call back in the next 1-2 days due to someone arriving to her door and she stated she wished to leave her phone line open at this time.    Melissa Behl BSN, RN, N  [facility] Care Coordinator  [phone]

## 2018-09-17 NOTE — MR AVS SNAPSHOT
After Visit Summary   9/17/2018    Linda Spicer    MRN: [id number]           Patient Information     Date Of Birth          6/13/1931        Visit Information        Provider Department      9/17/2018 6:00 AM  ICD REMOTE [facility]        Today's Diagnoses     Sick sinus syndrome (H)    -  1       Follow-ups after your visit        Your next 10 appointments already scheduled     Oct 11, 2018  9:00 AM CDT   Level 1 with [place] 7 INFUSION   [facility]    [id number] [place] [id number]   [phone]            Nov 08, 2018  9:30 AM CST   Level 1 with [place] 9 INFUSION   [facility] ([facility])    [id number] [place] [id number]   [phone]            Dec 11, 2018 10:00 AM CST   Office Visit with PFT LAB   [facility])    [id number] [place] [id number]   [phone]           Bring a current list of meds and any records pertaining to this visit. For Physicals, please bring immunization records and any forms needing to be filled out. Please arrive 10 minutes early to complete paperwork.            Dec 11, 2018 11:00 AM CST   Return Visit with Dea Acosta MD   [facility])    [id number] [place] [id number]   [phone]            Jim 15, 2019  2:40 PM CST   Return Visit with Mario Davenport MD   [facility]    [address] [place] [phone]   [phone]            Feb 18, 2019  2:10 PM CST   Return Visit with Emeterio Loza MD   [facility])    [id number] [place] [phone]   [phone]            Mar 01, 2019 10:00 AM CST   Return Visit with Asia Steven PA-C   Counts include 234 beds at the [facility]  [facility]    [address] [address] [place] [address] [phone]              Future tests that were ordered for you today     Open Future Orders        Priority Expected Expires Ordered    Cardioversion Routine 10/1/2018 9/17/2019 9/17/2018            Who to contact     If you have questions or need follow up information about today's clinic visit or your schedule please contact [facility] directly at [phone].  Normal or non-critical lab and imaging results will be communicated to you by [employer], letter or phone within 4 business days after the clinic has received the results. If you do not hear from us within 7 days, please contact the clinic through [employer] or phone. If you have a critical or abnormal lab result, we will notify you by phone as soon as possible.  Submit refill requests through [employer] or call your pharmacy and they will forward the refill request to us. Please allow 3 business days for your refill to be completed.          Additional Information About Your Visit        Bubbles Information     Bubbles gives you secure access to your electronic health record. If you see a primary care provider, you can also send messages to your care team and make appointments. If you have questions, please call your primary care clinic.  If you do not have a primary care provider, please call [phone] and they will assist you.        Care EveryWhere ID     This is your Care EveryWhere ID. This could be used by other organizations to access your [place] medical records  [id number]        Your Vitals Were     Last Period                   (LMP Unknown)            Blood Pressure from Last 3 Encounters:   09/17/18 113/70   09/14/18 189/69   09/14/18 163/77    Weight from Last 3 Encounters:   09/17/18 72.1 kg (159 lb)   09/14/18 71.5 kg (157 lb 9.6 oz)   09/13/18 76.8 kg (169 lb 4.8 oz)              We Performed the Following     INTERROGATION DEVICE EVAL REMOTE, PACER/ICD           Today's Medication Changes          These changes are accurate as of 9/17/18  5:49 PM.  If you have any questions, ask your nurse or doctor.               Start taking these medicines.        Dose/Directions    dronedarone 400 MG Tabs tablet   Commonly known as:  MULTAQ   Used for:  Paroxysmal atrial fibrillation (H)   Started by:  Emeterio Loza MD        Dose:  400 mg   Take 1 tablet (400 mg) by mouth 2 times daily (with meals)   Quantity:  60 tablet   Refills:  3         Stop taking these medicines if you haven't already. Please contact your care team if you have questions.     propafenone 150 MG Tabs tablet   Commonly known as:  RYTHMOL   Stopped by:  Emeterio Loza MD                Where to get your medicines      These medications were sent to [facility] [address] [place] [address]  [address] [facility] [address]     Phone:  [phone]     dronedarone 400 MG Tabs tablet                Primary Care Provider Office Phone # Fax #    Tre Lockett [phone]       [address]  [facility] [address]        Goals        General    #1 Medication  (pt-stated)     Notes - Note created  8/21/2018 10:37 AM by Behl, Melissa K, RN    Goal Statement: I will take my medications as prescribed.  Measure of Success: Patient and home care will report consistently taking medication as prescribed.  Supportive Steps to Achieve: RN CC notified home care RN of medication error.  Barriers: multiple complex chronic health conditions  Strengths: has home care, care coordination and excellent family support  Date to Achieve By: 9/21/18  Patient expressed understanding of goal: yes         #2 I will complete my health care directive. (pt-stated)     Notes - Note edited  8/21/2018 10:39 AM by Behl, Melissa K, RN    Goal Statement: I will complete my health care directive.  Measure of Success: Health care directive will be completed and scanned into  chart.  Supportive Steps to Achieve: Patient has attended a health care directive class, 10/24/17 informed of CPR vs intubation  Barriers: is awaiting to see her  to finalize document  Strengths: has care coordination, home care and excellent family support  Date to Achieve By: 12/31/18  Patient expressed understanding of goal: yes               Equal Access to Services     [facility] : Hadii rakesh ku hadmyrao Sojasonali, waaxda luqadaha, qaybta kaalmada ana, zahraa quezadairmaaddy moss . So [facility] [phone].    ATENCIÓN: Si habla español, tiene a merchant disposición servicios gratuitos de asistencia lingüística. [facility] [phone].    We comply with applicable federal civil rights laws and [place] laws. We do not discriminate on the basis of race, color, national origin, age, disability, sex, sexual orientation, or gender identity.            Thank you!     Thank you for choosing [facility]  for your care. Our goal is always to provide you with excellent care. Hearing back from our patients is one way we can continue to improve our services. Please take a few minutes to complete the written survey that you may receive in the mail after your visit with us. Thank you!             Your Updated Medication List - Protect others around you: Learn how to safely use, store and throw away your medicines at www.disposemymeds.org.          This list is accurate as of 9/17/18  5:49 PM.  Always use your most recent med list.                   Brand Name Dispense Instructions for use Diagnosis    ACETAMINOPHEN PO      Take 1,000 mg by mouth 2 times daily as needed        albuterol (2.5 MG/3ML) 0.083% neb solution      Take 1 vial by nebulization every 6 hours as needed for shortness of breath / dyspnea or wheezing        apixaban ANTICOAGULANT 2.5 MG tablet    ELIQUIS    180 tablet    Take 1 tablet (2.5 mg) by mouth 2 times daily    Atrial flutter, paroxysmal (H)       azaTHIOprine 50 MG tablet     IMURAN    90 tablet    Take 1 tablet (50 mg) by mouth daily    Rheumatoid arthritis involving multiple sites with positive rheumatoid factor (H)       Blood Pressure Monitor Kit     1 kit    1 kit daily as needed    CHF (congestive heart failure) (H)       calcium carbonate 600 mg-vitamin D 400 units 600-400 MG-UNIT per tablet    CALTRATE     Take 1 tablet by mouth 2 times daily        COMPOUNDED NON-CONTROLLED SUBSTANCE - PHARMACY TO MIX COMPOUNDED MEDICATION    CMPD RX    30 g    Estriol 1mg/gram. Place 1 gram vaginally daily for two weeks, then vaginally twice weekly after.    Atrophic vaginitis       dronedarone 400 MG Tabs tablet    MULTAQ    60 tablet    Take 1 tablet (400 mg) by mouth 2 times daily (with meals)    Paroxysmal atrial fibrillation (H)       fish oil-omega-3 fatty acids 1000 MG capsule      Take 2 g by mouth daily. 2 capsules daily        folic acid 1 MG tablet    FOLVITE    90 tablet    Take 1 tablet (1 mg) by mouth daily    Oral aphthae       GENTEAL MILD OP      Apply  to eye daily.        hydrochlorothiazide 25 MG tablet    HYDRODIURIL    30 tablet    Take 1 tablet (25 mg) by mouth daily    Hypertension goal BP (blood pressure) < 150/90       hydrocortisone 2.5 % cream     30 g    Apply BID to affected region(s) for 7-10 days.    Rash       levothyroxine 75 MCG tablet    SYNTHROID/LEVOTHROID    90 tablet    TAKE ONE TABLET BY MOUTH ONCE DAILY    Hypothyroidism, unspecified type       loratadine 10 MG tablet    CLARITIN     Take 10 mg by mouth every morning        losartan 50 MG tablet    COZAAR    180 tablet    Take 1 tablet (50 mg) by mouth 2 times daily    Heart failure with preserved ejection fraction, NYHA class I (H), Hypertension goal BP (blood pressure) < 130/80       metoprolol succinate 25 MG 24 hr tablet    TOPROL-XL    90 tablet    Take 1 tablet (25 mg) by mouth daily    Hypertension goal BP (blood pressure) < 140/90       mirtazapine 15 MG tablet    REMERON    30 tablet    Take 1  tablet (15 mg) by mouth At Bedtime    Adjustment insomnia       nitroGLYcerin 0.4 MG sublingual tablet    NITROSTAT    25 tablet    Place 1 tablet (0.4 mg) under the tongue every 5 minutes as needed for chest pain    Chest pain       * order for DME     1 Box    Equipment being ordered: Dispense face mask. Mrs. Spicer is immunosuppressed due to rheumatoid arthritis.    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 Use with Albuterol.    Hypoxia       order for DME     1 each    Equipment being ordered: Dispense baffle, for use with nebulizer.    Pneumonia of left upper lobe due to Mycoplasma pneumoniae       * order for DME     1 each    Dispense SP Walker-small    Pain of toe of right foot, Status post bunionectomy       PRESERVISION AREDS PO      Take 1 tablet by mouth daily        ranibizumab 0.3 MG/0.05ML Soln    LUCENTIS     0.3 mg by Intravitreal route Every 6 weeks        ranitidine 150 MG tablet    ZANTAC    60 tablet    Take 1 tablet (150 mg) by mouth 2 times daily    Gastroesophageal reflux disease without esophagitis       REFRESH P.M. Oint      Apply  to eye. Daily at bedtime        saccharomyces boulardii 250 MG capsule    FLORASTOR     Take 250 mg by mouth daily        senna-docusate 8.6-50 MG per tablet    SENOKOT-S;PERICOLACE    120 tablet    Take 2 tablets by mouth At Bedtime Hold if diarrhea occurs.    Constipation, chronic       STATIN NOT PRESCRIBED (INTENTIONAL)      Please choose reason not prescribed, below    Hyperlipidemia LDL goal <100       VITAMIN C PO      Take 500 mg by mouth daily        ZYRTEC ALLERGY 10 MG tablet   Generic drug:  cetirizine      Take 10 mg by mouth At Bedtime        * Notice:  This list has 3 medication(s) that are the same as other medications prescribed for you. Read the directions carefully, and ask your doctor or other care provider to review them with you.

## 2018-09-18 ENCOUNTER — TELEPHONE (OUTPATIENT)
Dept: CARDIOLOGY | Facility: CLINIC | Age: 83
End: 2018-09-18

## 2018-09-18 DIAGNOSIS — Z53.9 DIAGNOSIS NOT YET DEFINED: Primary | ICD-10-CM

## 2018-09-18 DIAGNOSIS — I48.0 PAROXYSMAL ATRIAL FIBRILLATION (H): ICD-10-CM

## 2018-09-18 NOTE — PROGRESS NOTES
Clinic Care Coordination Contact    Clinic Care Coordination Contact  OUTREACH    Referral Information:  Referral Source: Self-patient/Caregiver    Primary Diagnosis: CHF    Chief Complaint   Patient presents with     Clinic Care Coordination - Follow-up     RN        Universal Utilization: Patient is followed by rheumatology, cardiology, endocrinology and pulmonology  Clinic Utilization  Difficulty keeping appointments:: No  Compliance Concerns: No  No-Show Concerns: No  No PCP office visit in Past Year: No  Utilization    Last refreshed: 9/17/2018 11:16 PM:  No Show Count (past year) 1       Last refreshed: 9/17/2018 11:16 PM:  ED visits 0       Last refreshed: 9/17/2018 11:16 PM:  Hospital admissions 0          Current as of: 9/17/2018 11:16 PM             Clinical Concerns:  Current Medical Concerns:  Patient reviewed with RN CC her cardiology visits due to her A-fib.  Patient will have another cardioversion in 1 week and if this does not work she will have an ablation.  Patient states they are working on changing her cardiac medications, however, they will have to obtain a prior authorization first.  Patient reports feeling very tired with ongoing back pain.  Patient had to miss some home care visits due to her cardiology appointments and her 's passing, but states she has home care PT coming out tomorrow to work on her back pain.  Patient was taking Tylenol 500 mg once/day and inquired how often she can take this.  RN CC educated patient on taking no more than 4-6 tabs/day.  Patient Active Problem List   Diagnosis     ACP (advance care planning)     Hypertension goal BP (blood pressure) < 150/90     Hypothyroidism     Macular degeneration, left eye     Irritable bowel syndrome     Encounter for palliative care     Adjustment disorder with anxious mood     Mild anemia     DDD (degenerative disc disease), lumbar     CKD (chronic kidney disease) stage 3, GFR 30-59 ml/min     History of blood transfusion      Aftercare following surgery     S/P lumbar laminectomy     High risk medication use     Age-related osteoporosis without current pathological fracture     Atrophic vaginitis     Fecal incontinence     Female stress incontinence     Impingement syndrome of both shoulders     UIP (usual interstitial pneumonitis) (H)     High risk medications (not anticoagulants) long-term use     Heart failure with preserved ejection fraction (H)     Congestive heart failure with preserved LV function, NYHA class 3 (H)     Other specified hypothyroidism     Rheumatoid arthritis involving multiple sites with positive rheumatoid factor (H)     Health Care Home     Sick sinus syndrome (H)     Cardiac pacemaker - Medtronic dual lead pacemaker - Not Dependent - MRI Safe     Immunosuppression (H)     ILD (interstitial lung disease) (H)     Heart failure with preserved ejection fraction, NYHA class I (H)     Atrial flutter (H)        Current Behavioral Concerns: Patient reports difficult sleeping since her 's death on 9/5/18.  Patient was taking Remeron as needed, but did not feel this helped.  She will continue to update RN CC on her mental health/coping as time progresses.      Education Provided to patient: RN CC educated patient on maximum Tylenol dosing.   Pain  Pain (GOAL):: No  Health Maintenance Reviewed: Due/Overdue   Health Maintenance Due   Topic Date Due     MICROALBUMIN Q1 YEAR  07/26/2018     INFLUENZA VACCINE (1) 09/01/2018      Clinical Pathway: None    Medication Management:  Patient is managing her medications on her own, but has had a history of medication errors.  Patient's spouse used to assist patient in medication set up prior to his passing on 9/5/18.  Patient has family helping as well.     Functional Status:  Dependent ADLs:: Ambulation-no assistive device  Bed or wheelchair confined:: No  Mobility Status: Independent    Living Situation:  Current living arrangement:: I live in a private home with  spouse  Type of residence:: Apartment    Diet/Exercise/Sleep:  Diet:: Low cholesterol, Low saturated fat, No added salt  Inadequate nutrition (GOAL):: No  Food Insecurity: No  Tube Feeding: No  Exercise:: Currently not exercising  Inadequate activity/exercise (GOAL):: No  Significant changes in sleep pattern (GOAL): No    Transportation:  Transportation concerns (GOAL):: No  Transportation means:: Family, [place] mobility     Psychosocial:  Cheondoism or spiritual beliefs that impact treatment:: No  Mental health DX:: No  Mental health management concern (GOAL):: No  Informal Support system:: Stacey based, Family, Friends, Neighbors, Spouse     Financial/Insurance:   Financial/Insurance concerns (GOAL):: No  Patient states it has been challenging to cook for 1 person.  Patient states her daughter is coming over today to prepare meals and freeze them.  Patient states she was eating at the diner in their senior living apartment, however, the food is not heart healthy.  Patient states she spoke with the manager of the dining area and requested they offer heart healthy and diabetic friendly meal options for their residents.     Resources and Interventions:  Current Resources:   List of home care services:: Skilled Nursing, Home Health Aid, Physicial Therapy;   Community Resources: Home Care     Equipment Currently Used at Home: raised toilet, shower chair    Advance Care Plan/Directive  Advanced Care Plans/Directives on file:: In process  Advanced Care Plan/Directive Status: In Process    Referrals Placed: None     Goals:   Goals        General    #1 Medication  (pt-stated)     Notes - Note created  8/21/2018 10:37 AM by Behl, Melissa K, RN    Goal Statement: I will take my medications as prescribed.  Measure of Success: Patient and home care will report consistently taking medication as prescribed.  Supportive Steps to Achieve: RN CC notified home care RN of medication error.  Barriers: multiple complex chronic health  conditions  Strengths: has home care, care coordination and excellent family support  Date to Achieve By: 9/21/18  Patient expressed understanding of goal: yes         #2 I will complete my health care directive. (pt-stated)     Notes - Note edited  8/21/2018 10:39 AM by Behl, Melissa K, RN    Goal Statement: I will complete my health care directive.  Measure of Success: Health care directive will be completed and scanned into chart.  Supportive Steps to Achieve: Patient has attended a health care directive class, 10/24/17 informed of CPR vs intubation  Barriers: is awaiting to see her  to finalize document  Strengths: has care coordination, home care and excellent family support  Date to Achieve By: 12/31/18  Patient expressed understanding of goal: yes                     Patient/Caregiver understanding: Patient has fair understanding of her current plan of care, but will need ongoing care coordination support due to the recent passing of her spouse.    Outreach Frequency: monthly  Future Appointments              In 3 weeks [place] 7 INFUSION On license of [facility]    In 1 month [place] 9 INFUSION On license of [facility]    In 2 months PFT LAB On license of [facility]    In 2 months Dea Acosta MD On license of [facility]    In 3 months [facility]    In 5 months Emeterio Loza MD On license of [facility]    In 5 months Asia Steven PA-C On license of [facility]          Plan:   1. Patient will take medications as prescribed; awaiting prior authorization for new cardiac medication.  2. Patient will take Tylenol as needed for back pain per package instructions.  3. RN CC will follow up with patient in 1 week.    Melissa Behl BSN, RN, PHN  [facility] Care Coordinator  [phone]

## 2018-09-18 NOTE — TELEPHONE ENCOUNTER
Called and spoke to pharmacy. Checked on price of Multaq. The cost is $726 for a 30 day supply. Asked her to send the paperwork over to start a  Prior authorization. She will do so.  Received information from [facility] pharmacy. Called [facility] ([phone] ) and spoke with Lucille. She transferred me to Yanira ( [phone] ) who completed Prior authorization while on the phone. Ref # [id number].  We will be notified by fax of decision. Patient informed.   BRANDI Berger

## 2018-09-19 ENCOUNTER — OFFICE VISIT (OUTPATIENT)
Dept: CARDIOLOGY | Facility: CLINIC | Age: 83
End: 2018-09-19
Attending: INTERNAL MEDICINE
Payer: MEDICARE

## 2018-09-19 ENCOUNTER — TELEPHONE (OUTPATIENT)
Dept: CARDIOLOGY | Facility: CLINIC | Age: 83
End: 2018-09-19

## 2018-09-19 VITALS
OXYGEN SATURATION: 98 % | HEIGHT: 63 IN | DIASTOLIC BLOOD PRESSURE: 82 MMHG | HEART RATE: 61 BPM | BODY MASS INDEX: 27.64 KG/M2 | SYSTOLIC BLOOD PRESSURE: 128 MMHG | WEIGHT: 156 LBS

## 2018-09-19 DIAGNOSIS — I48.0 PAROXYSMAL ATRIAL FIBRILLATION (H): Primary | ICD-10-CM

## 2018-09-19 DIAGNOSIS — Z79.899 ON AMIODARONE THERAPY: ICD-10-CM

## 2018-09-19 DIAGNOSIS — I49.5 SICK SINUS SYNDROME (H): Primary | ICD-10-CM

## 2018-09-19 PROCEDURE — 93294 REM INTERROG EVL PM/LDLS PM: CPT | Performed by: INTERNAL MEDICINE

## 2018-09-19 PROCEDURE — 93296 REM INTERROG EVL PM/IDS: CPT | Mod: ZF

## 2018-09-19 PROCEDURE — 93010 ELECTROCARDIOGRAM REPORT: CPT | Mod: ZP | Performed by: INTERNAL MEDICINE

## 2018-09-19 PROCEDURE — G0463 HOSPITAL OUTPT CLINIC VISIT: HCPCS | Mod: 25,ZF

## 2018-09-19 PROCEDURE — 99205 OFFICE O/P NEW HI 60 MIN: CPT | Mod: ZP | Performed by: INTERNAL MEDICINE

## 2018-09-19 RX ORDER — AMIODARONE HYDROCHLORIDE 200 MG/1
TABLET ORAL
Qty: 120 TABLET | Refills: 3 | Status: SHIPPED | OUTPATIENT
Start: 2018-09-19 | End: 2018-11-27

## 2018-09-19 ASSESSMENT — PAIN SCALES - GENERAL: PAINLEVEL: MODERATE PAIN (5)

## 2018-09-19 NOTE — PROGRESS NOTES
Preliminary Device Interrogation Results.  Final physician signed paceart report to be scanned and attached.    Patient called this morning to report that she is feeling very poorly today.  Patient is s/p cardioversion on 9/14/18.  Patient went back into AF on 9/16/18 @ 0642.  Patient states that after she took a shower this morning she felt very SOB, fatigued and experienced burning in her neck and chest.  Patient states she is feeling a little better since resting.  Patient states that she is wondering if she should move forward with a cardioversion and/or ablation.  Stacey Perez RN for Dr. Loza notified of above.  Plan for patient to come to clinic for an appt with Dr. Carrasquillo at 1 pm today.  Remote pacemaker transmission received and reviewed.  Device transmission sent per MD orders.  Patient has a Medtronic dual lead pacemaker.  Normal pacemaker function.  AF burden = 100%.  Presenting EGM = AF with vent rate @ ~ 70's bpm.  Patient is taking Eliquis.  AP = 0.6%.   = 79.4%.  Estimated battery longevity to ZENAIDA = 6.5 years.      Remote pacemaker transmission

## 2018-09-19 NOTE — LETTER
9/19/2018      RE: Linda Spicer  [address]  [place] [id number]       Dear Colleague,    Thank you for the opportunity to participate in the care of your patient, Linda Spicer, at the [facility] at [facility]. Please see a copy of my visit note below.    Electrophysiology Clinic Note  HPI:   Ms. 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 She was referred for management of AF/AFL.     She was first diagnosed with AFL in 8/2016 when she presented with chest pain, dizziness, and lightheadedness. She was found to be in AFL and converted back to sinus while in the ER. She was started on Toprol XL and her losartan was decreased. Anticoagulation was deferred until Cardiology follow up. She was then started on Eliquis in 11/2016. She had recurrent AFL in 2/2017 and her beta blocker was increased. However, she developed symptomatic bradycardia and presyncope and her beta blocker was stopped. She then developed RVR again and had a PPM implanted for tachy/lisa syndrome at [facility]. Afterwards, she was restarted on Toprol XL and propofenone was added. She then had recurrent ER visits in 11/2017, 1/2018, and 2/2018 with palpitations, lightheadedness, and shortness of breath. Her device interrogation showed 7% AF burden up to 3 days corresponding to symptoms. She was then admitted to [facility] in 6/2018 after experiencing a fall and fractured her coccyx. She was in AF during that hospitalization and required DCCV. She had another fall in 7/2018 resulting in fractured S3. She then presented on 9/13/18 to [facility] with exhaustion, SOB, and some palpitations, She was back in AF. She underwent DCCV on 9/14/18. She stated that  "she had immediate improvement in her symptoms and \"felt like a new person.\" Then on 9/16/18, she awoke feeling worse and felt she was back in AF. Device transmission confirmed recurrence of AF. She followed up with Dr. Loza on 9/17/18 and she continued to feel SOB, RUSSELL, fatigue, and some lightheadedness.  Decision was made to stop propafenone for 72 hours and start multaq 400 mg BID thereafter and scheduled DCCV. She then called and requested appointment with EP due to ongoing symptoms and lack of insurance coverage for multaq. She also has a cardiac history of HFpEF and has been on cozaar and metoprolol for this. Last echo in 5/2016 showed LVEF 65%, mild RV enlargement and mildly decreased RV function, diastolic dysfunction, moderate MR, moderately dilated LA, and evidence of pulmonary HTN. Last echo from OSH showed LVEF 65-70%, midly dilated LA, sigmoid septum, mild LVH, and mild AR. She does have known rheumatoid pulmonary fibrosis dating back to 1973. She had PFTs in 2015 that showed moderate lung diffusion defect. She has followed with Pulmonary Dr. Comer and had previously had sever dyspnea and hyperventilation in the past now showing significant improvement in ventilatory control and symptoms with improvement in PFTs and exercise tolerance. She has been tolerating low dose BB with her pulmonary status.     She presents today for AF management. She reports feeling extremely tired, decreased stamina, and RUSSELL which worsened with the onset of AF. Device interrogation shows normal pacemaker function. She went back into AF on 9/16/18 @ 0642.  Patient states that after she took a shower this morning she felt very SOB, fatigued and experienced burning in her neck and chest.  Patient states she is feeling a little better since resting.  AP = 0.6%.   = 79.4%.   She denies any chest pain/pressures, leg/ankle swelling, PND, orthopnea, palpitations, or syncopal symptoms. Presenting 12 lead ECG shows  underlying " AF Vent Rate 60 bpm,  ms, QTc 498 ms. Current cardiac medications include; Toprol XL, propafenone, hydrochlorothiazide, cozaar, and Eliquis.     PAST MEDICAL HISTORY:  Past Medical History:   Diagnosis Date     Adjustment disorder with anxious mood 5/18/2015     Advanced directives, counseling/discussion 8/30/2012    Patient states has Advance Directive and will bring in a copy to clinic. 8/30/2012   [facility] Medical Assistant \       Anemia 9/25/2015     Basal cell cancer      CHF (congestive heart failure) (H) 9/18/2014     CKD (chronic kidney disease) stage 3, GFR 30-59 ml/min 9/29/2015     DDD (degenerative disc disease), lumbar 9/25/2015     Diffuse idiopathic pulmonary fibrosis (H) 5/6/2013     Encounter for palliative care 5/18/2015     History of blood transfusion 9/29/2015     Hypertension goal BP (blood pressure) < 140/90 9/7/2012     Hypothyroid 9/7/2012     Irritable bowel syndrome 10/29/2013     Macular degeneration      Macular degeneration, 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     Nondisplaced spiral fracture of shaft of humerus      Osteoporosis 8/13/2013     Im[employer] Update utility     RA (rheumatoid arthritis) (H) 5/7/2013     Rheumatic fever      Shingles      Spinal stenosis of lumbar region with neurogenic claudication 9/14/2015       CURRENT MEDICATIONS:  Current Outpatient Prescriptions   Medication Sig Dispense Refill     ACETAMINOPHEN PO Take 1,000 mg by mouth 2 times daily as needed        albuterol (2.5 MG/3ML) 0.083% neb solution Take 1 vial by nebulization every 6 hours as needed for shortness of breath / dyspnea or wheezing       apixaban ANTICOAGULANT (ELIQUIS) 2.5 MG tablet Take 1 tablet (2.5 mg) by mouth 2 times daily 180 tablet 3     Artificial Tear Ointment (REFRESH P.M.) OINT Apply  to eye. Daily at bedtime          Ascorbic Acid (VITAMIN C PO) Take 500 mg by mouth daily        azaTHIOprine (IMURAN) 50 MG tablet Take 1 tablet (50 mg) by mouth daily 90 tablet 2     Blood Pressure  Monitor KIT 1 kit daily as needed 1 kit 0     calcium-vitamin D (CALTRATE) 600-400 MG-UNIT per tablet Take 1 tablet by mouth 2 times daily        cetirizine (ZYRTEC ALLERGY) 10 MG tablet Take 10 mg by mouth At Bedtime       COMPOUNDED NON-CONTROLLED SUBSTANCE (CMPD RX) - PHARMACY TO MIX COMPOUNDED MEDICATION Estriol 1mg/gram. Place 1 gram vaginally daily for two weeks, then vaginally twice weekly after. 30 g 6     dronedarone (MULTAQ) 400 MG TABS tablet Take 1 tablet (400 mg) by mouth 2 times daily (with meals) 60 tablet 3     fish oil-omega-3 fatty acids (FISH OIL) 1000 MG capsule Take 2 g by mouth daily. 2 capsules daily            folic acid (FOLVITE) 1 MG tablet Take 1 tablet (1 mg) by mouth daily 90 tablet 3     hydrochlorothiazide (HYDRODIURIL) 25 MG tablet Take 1 tablet (25 mg) by mouth daily 30 tablet 5     hydrocortisone 2.5 % cream Apply BID to affected region(s) for 7-10 days. 30 g 0     Hypromellose (GENTEAL MILD OP) Apply  to eye daily.       levothyroxine (SYNTHROID/LEVOTHROID) 75 MCG tablet TAKE ONE TABLET BY MOUTH ONCE DAILY 90 tablet 1     loratadine (CLARITIN) 10 MG tablet Take 10 mg by mouth every morning       losartan (COZAAR) 50 MG tablet Take 1 tablet (50 mg) by mouth 2 times daily 180 tablet 1     metoprolol succinate (TOPROL-XL) 25 MG 24 hr tablet Take 1 tablet (25 mg) by mouth daily 90 tablet 3     mirtazapine (REMERON) 15 MG tablet Take 1 tablet (15 mg) by mouth At Bedtime 30 tablet 1     Multiple Vitamins-Minerals (PRESERVISION AREDS PO) Take 1 tablet by mouth daily        nitroglycerin (NITROSTAT) 0.4 MG SL tablet Place 1 tablet (0.4 mg) under the tongue every 5 minutes as needed for chest pain 25 tablet 0     order for DME Dispense SP Walker-small 1 each 0     order for DME Equipment being ordered: Dispense baffle, for use with nebulizer. 1 each 0     order for DME Equipment being ordered: Nebulizer. Use with Albuterol. 1 each 0     order for DME Equipment being ordered: Dispense face  mask.  Mrs. Spicer is immunosuppressed due to rheumatoid arthritis. 1 Box 11     ranibizumab (LUCENTIS) 0.3 MG/0.05ML SOLN 0.3 mg by Intravitreal route Every 6 weeks       ranitidine (ZANTAC) 150 MG tablet Take 1 tablet (150 mg) by mouth 2 times daily 60 tablet 5     saccharomyces boulardii (FLORASTOR) 250 MG capsule Take 250 mg by mouth daily       senna-docusate (SENOKOT-S;PERICOLACE) 8.6-50 MG per tablet Take 2 tablets by mouth At Bedtime Hold if diarrhea occurs. 120 tablet 11     STATIN NOT PRESCRIBED, INTENTIONAL, Please choose reason not prescribed, below         PAST SURGICAL HISTORY:  Past Surgical History:   Procedure Laterality Date     ANESTHESIA CARDIOVERSION N/A 9/14/2018    Procedure: ANESTHESIA CARDIOVERSION;;  Surgeon: GENERIC ANESTHESIA PROVIDER;  Location: [facility] OR     APPENDECTOMY       BIOPSY      hemorrhoidectomy     ENT SURGERY      tonsillectomy     GYN SURGERY      3 D & C's     HYSTERECTOMY, PAP NO LONGER INDICATED       LAMINECTOMY LUMBAR ONE LEVEL N/A 10/13/2015    Procedure: LAMINECTOMY LUMBAR ONE LEVEL;  Surgeon: Fransico Toussaint MD;  Location: [facility] OR       ALLERGIES:     Allergies   Allergen Reactions     Cephalexin Hcl Diarrhea     Gabapentin Other (See Comments)     Dizzsiness     Naproxen GI Disturbance     Perfume      Lactase Other (See Comments)     Macrobid [Nitrofurantoin Anhydrous]      Possibly related to lung disease      Seasonal Allergies      Sulfa Drugs      Throat swelling     Xanax [Alprazolam] Other (See Comments)     Dizziness      Ciprofloxacin Itching and Rash       FAMILY HISTORY:  Family History   Problem Relation Age of Onset     Hypertension Mother      Psychotic Disorder Father      Diabetes Son      Diabetes Daughter      Blood Disease Daughter        SOCIAL HISTORY:  Social History   Substance Use Topics     Smoking status: Never Smoker     Smokeless tobacco: Never Used     Alcohol use Yes      Comment: rare wine        ROS:   A comprehensive 10 point review  "of systems negative other than as mentioned in HPI.  Exam:  /82 (BP Location: Left arm, Patient Position: Chair, Cuff Size: Adult Regular)  Pulse 61  Ht 1.6 m (5' 3\")  Wt 70.8 kg (156 lb)  LMP  (LMP Unknown)  SpO2 98%  BMI 27.63 kg/m2  GENERAL APPEARANCE: alert and no distress  HEENT: no icterus, no xanthelasmas, normal pupil size and reaction, normal palate, mucosa moist, no central cyanosis  NECK: no adenopathy, no asymmetry, masses, or scars, thyroid normal to palpation and no bruits, JVP not elevated  RESPIRATORY: lungs clear to auscultation - no rales, rhonchi or wheezes, no use of accessory muscles, no retractions, respirations are unlabored, normal respiratory rate  CARDIOVASCULAR: regular rhythm, normal S1 with physiologic split S2, no S3 or S4 and no murmur, click or rub, precordium quiet with normal PMI.  ABDOMEN: soft, non tender, bowel sounds normal, non-distended  EXTREMITIES: peripheral pulses normal, no edema  NEURO: alert and oriented to person/place/time, normal speech, gait and affect  SKIN: no ecchymoses, no rashes  PSYCH: normal affect, cooperative    Labs:  Reviewed.     Testing/Procedures:  PULMONARY FUNCTION TESTS:   PFT Latest Ref Rng & Units 2/14/2018   FVC L 1.85   FEV1 L 1.40   FVC% % 80   FEV1% % 80         6/2018 ECHOCARDIOGRAM (OSH REPORT):   Interpretation Summary    * The left ventricular systolic function is normal, estimated LVEF 65-70%.    * The left atrium is mildly dilated.    * There is mild concentric left ventricular hypertrophy.    * Sigmoid septum.    * pacemaker wire is in the right ventricle.    * There is mild aortic regurgitation.    * Compared to 02/07/2017 TTE, no major changes noted.      Assessment and Plan:   Ms. 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 She was referred for management of AF/AFL.   She was first diagnosed with AFL in 8/2016 and has has several recurrences over the years and developed tachy/lisa syndrome and had PPM implanted in 2017. Afterwards, she was restarted on Toprol XL and propofenone was added. She continued to have symptomatic recurrences with multiple ER visits and DCCVs in 6/2018 and recently on 9/14/18. She went back into AF about 1.5 days later with recurrence of symptoms. She followed up with Dr. Loza on 9/17/18 and she continued to feel SOB, RUSSELL, fatigue, and some lightheadedness.  Decision was made to stop propafenone for 72 hours and start multaq 400 mg BID thereafter and scheduled DCCV. She then called and requested appointment with EP due to ongoing symptoms and lack of insurance coverage for multaq. She also has a cardiac history of HFpEF and has been on cozaar and metoprolol for this. Last echo in 5/2016 showed LVEF 65%, mild RV enlargement and mildly decreased RV function, diastolic dysfunction, moderate MR, moderately dilated LA, and evidence of pulmonary HTN. She does have known rheumatoid pulmonary fibrosis dating back to 1973. She had PFTs in 2015 that showed moderate lung diffusion defect. She has followed with Pulmonary Dr. Comer and had previously had sever dyspnea and hyperventilation in the past now showing significant improvement in ventilatory control and symptoms with improvement in PFTs and exercise tolerance. She has been tolerating low dose BB with her pulmonary status. She presents today for AF management. She reports feeling extremely tired, decreased stamina, and RUSSELL which worsened with the onset of AF. Device interrogation shows normal pacemaker function. She went back into AF on 9/16/18 @ 0642.  Patient states that after she took a shower this morning she felt very SOB, fatigued and experienced burning in her neck and chest.  Patient states she is feeling a little better since resting.  AP  = 0.6%.   = 79.4%.   Presenting 12 lead ECG shows  underlying AF Vent Rate 60 bpm,  ms, QTc 498 ms. Current cardiac medications include; Toprol XL, propafenone, hydrochlorothiazide, cozaar, and Eliquis.     We discussed in detail with the patient management/treatment options for Donato includin. Stroke Prophylaxis:  CHADSVASC=++age, +gender, +HTN  3, corresponding to a 3.2% annual stroke / systemic emolism event rate. indicating need for long term oral anticoagulation.  She is appropriately on Eliquis which has been renally dosed due to age, hx of variable renal function. She has had two mechanical falls resulting in bone fractures over the last year; however, no bleeding issues.   2. Rate Control: Appears to be adequately rate controlled. Continue Toprol XL, can consider changing to diltiazem if BB an issue with her pulmonary disease; however, has tolerated well thus far.   3. Rhythm Control: Cardioversion, Antiarrhythmics and/or ablation are options for rhythm control. She has been reported to have AFL (tracings not available to us) and device interrogation now c/w AF. She has been on propafenone since 2017 with continued recurrences. Originally, multaq was going to be tried, but cost prohibitive as not covered by her insurance. Limited AAT options given variable renal function (prefer to avoid Sotalol or dofetilide). Therefore, we will have stop propafenone for 48 hours prior to starting amiodarone.  400 mg BID X1 week, then 200 mg BID X1 week, then 200 mg daily thereafter. If she does not chemically cardiovert, we will arrange for DCCV. We will have her send in a remote device transmission in 2 weeks to confirm if she is in AF or note after some amiodarone loading. We will have to monitor her lung function closely when on amiodarone. She will require PFTs at starting and then would monitor again in 6 months then at least annually. She will require LFTs/TFTs at baseline and every 6 months.     4.  Risk Factor Management: maintain tight BP control and JESUS MANUEL evaluation as indicated.      Follow up in 3 months.     The patient states understanding and is agreeable with plan.   This patient was seen and evaluated with Dr. Carrasquillo. The above note reflects our joint assessment and plan.   SUNNI Ortiz CNP  Pager: [phone]    EP STAFF NOTE  I have seen and examined the patient as part of a shared visit with RAAD Ortiz NP.  I agree with the note above. I reviewed today's vital signs and medications. I have reviewed and discussed with the advanced practice provider their physical examination, assessment, and plan   Briefly, persistent AF, symptomatic despite no RVR and RV pacing, very little options from medical standpoint, propafenone not effective  My key history/exam findings are: AF.   The key management decisions made by me: amiodarone, high risk of stroke and bleeding with ablation given her age.      Franki Carrasquillo MD [facility]  Cardiology - Electrophysiology    CC  VOCAL, ELENA AVINA

## 2018-09-19 NOTE — PROGRESS NOTES
"Electrophysiology Clinic Note  HPI:   Ms. 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 She was referred for management of AF/AFL.     She was first diagnosed with AFL in 8/2016 when she presented with chest pain, dizziness, and lightheadedness. She was found to be in AFL and converted back to sinus while in the ER. She was started on Toprol XL and her losartan was decreased. Anticoagulation was deferred until Cardiology follow up. She was then started on Eliquis in 11/2016. She had recurrent AFL in 2/2017 and her beta blocker was increased. However, she developed symptomatic bradycardia and presyncope and her beta blocker was stopped. She then developed RVR again and had a PPM implanted for tachy/lisa syndrome at [facility]. Afterwards, she was restarted on Toprol XL and propofenone was added. She then had recurrent ER visits in 11/2017, 1/2018, and 2/2018 with palpitations, lightheadedness, and shortness of breath. Her device interrogation showed 7% AF burden up to 3 days corresponding to symptoms. She was then admitted to [facility] in 6/2018 after experiencing a fall and fractured her coccyx. She was in AF during that hospitalization and required DCCV. She had another fall in 7/2018 resulting in fractured S3. She then presented on 9/13/18 to [facility] with exhaustion, SOB, and some palpitations, She was back in AF. She underwent DCCV on 9/14/18. She stated that she had immediate improvement in her symptoms and \"felt like a new person.\" Then on 9/16/18, she awoke feeling worse and felt she was back in AF. Device transmission confirmed recurrence of AF. She followed up with Dr. Loza on 9/17/18 and she continued to feel SOB, RUSSELL, fatigue, and some lightheadedness.  Decision was made to stop " propafenone for 72 hours and start multaq 400 mg BID thereafter and scheduled DCCV. She then called and requested appointment with EP due to ongoing symptoms and lack of insurance coverage for multaq. She also has a cardiac history of HFpEF and has been on cozaar and metoprolol for this. Last echo in 5/2016 showed LVEF 65%, mild RV enlargement and mildly decreased RV function, diastolic dysfunction, moderate MR, moderately dilated LA, and evidence of pulmonary HTN. Last echo from OSH showed LVEF 65-70%, midly dilated LA, sigmoid septum, mild LVH, and mild AR. She does have known rheumatoid pulmonary fibrosis dating back to 1973. She had PFTs in 2015 that showed moderate lung diffusion defect. She has followed with Pulmonary Dr. Comer and had previously had sever dyspnea and hyperventilation in the past now showing significant improvement in ventilatory control and symptoms with improvement in PFTs and exercise tolerance. She has been tolerating low dose BB with her pulmonary status.     She presents today for AF management. She reports feeling extremely tired, decreased stamina, and RUSSELL which worsened with the onset of AF. Device interrogation shows normal pacemaker function. She went back into AF on 9/16/18 @ 0642.  Patient states that after she took a shower this morning she felt very SOB, fatigued and experienced burning in her neck and chest.  Patient states she is feeling a little better since resting.  AP = 0.6%.   = 79.4%.  She denies any chest pain/pressures, leg/ankle swelling, PND, orthopnea, palpitations, or syncopal symptoms. Presenting 12 lead ECG shows  underlying AF Vent Rate 60 bpm,  ms, QTc 498 ms. Current cardiac medications include; Toprol XL, propafenone, hydrochlorothiazide, cozaar, and Eliquis.     PAST MEDICAL HISTORY:  Past Medical History:   Diagnosis Date     Adjustment disorder with anxious mood 5/18/2015     Advanced directives, counseling/discussion 8/30/2012    Patient  [facility] has Advance Directive and will bring in a copy to clinic. 8/30/2012   Tevin May  [facility] Medical Assistant \       Anemia 9/25/2015     Basal cell cancer      CHF (congestive heart failure) (H) 9/18/2014     CKD (chronic kidney disease) stage 3, GFR 30-59 ml/min 9/29/2015     DDD (degenerative disc disease), lumbar 9/25/2015     Diffuse idiopathic pulmonary fibrosis (H) 5/6/2013     Encounter for palliative care 5/18/2015     History of blood transfusion 9/29/2015     Hypertension goal BP (blood pressure) < 140/90 9/7/2012     Hypothyroid 9/7/2012     Irritable bowel syndrome 10/29/2013     Macular degeneration      Macular degeneration, 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     Nondisplaced spiral fracture of shaft of humerus      Osteoporosis 8/13/2013     Im[employer] Update utility     RA (rheumatoid arthritis) (H) 5/7/2013     Rheumatic fever      Shingles      Spinal stenosis of lumbar region with neurogenic claudication 9/14/2015       CURRENT MEDICATIONS:  Current Outpatient Prescriptions   Medication Sig Dispense Refill     ACETAMINOPHEN PO Take 1,000 mg by mouth 2 times daily as needed        albuterol (2.5 MG/3ML) 0.083% neb solution Take 1 vial by nebulization every 6 hours as needed for shortness of breath / dyspnea or wheezing       apixaban ANTICOAGULANT (ELIQUIS) 2.5 MG tablet Take 1 tablet (2.5 mg) by mouth 2 times daily 180 tablet 3     Artificial Tear Ointment (REFRESH P.M.) OINT Apply  to eye. Daily at bedtime          Ascorbic Acid (VITAMIN C PO) Take 500 mg by mouth daily        azaTHIOprine (IMURAN) 50 MG tablet Take 1 tablet (50 mg) by mouth daily 90 tablet 2     Blood Pressure Monitor KIT 1 kit daily as needed 1 kit 0     calcium-vitamin D (CALTRATE) 600-400 MG-UNIT per tablet Take 1 tablet by mouth 2 times daily        cetirizine (ZYRTEC ALLERGY) 10 MG tablet Take 10 mg by mouth At Bedtime       COMPOUNDED NON-CONTROLLED SUBSTANCE (CMPD RX) - PHARMACY TO MIX COMPOUNDED MEDICATION Estriol 1mg/gram. Place 1  gram vaginally daily for two weeks, then vaginally twice weekly after. 30 g 6     dronedarone (MULTAQ) 400 MG TABS tablet Take 1 tablet (400 mg) by mouth 2 times daily (with meals) 60 tablet 3     fish oil-omega-3 fatty acids (FISH OIL) 1000 MG capsule Take 2 g by mouth daily. 2 capsules daily            folic acid (FOLVITE) 1 MG tablet Take 1 tablet (1 mg) by mouth daily 90 tablet 3     hydrochlorothiazide (HYDRODIURIL) 25 MG tablet Take 1 tablet (25 mg) by mouth daily 30 tablet 5     hydrocortisone 2.5 % cream Apply BID to affected region(s) for 7-10 days. 30 g 0     Hypromellose (GENTEAL MILD OP) Apply  to eye daily.       levothyroxine (SYNTHROID/LEVOTHROID) 75 MCG tablet TAKE ONE TABLET BY MOUTH ONCE DAILY 90 tablet 1     loratadine (CLARITIN) 10 MG tablet Take 10 mg by mouth every morning       losartan (COZAAR) 50 MG tablet Take 1 tablet (50 mg) by mouth 2 times daily 180 tablet 1     metoprolol succinate (TOPROL-XL) 25 MG 24 hr tablet Take 1 tablet (25 mg) by mouth daily 90 tablet 3     mirtazapine (REMERON) 15 MG tablet Take 1 tablet (15 mg) by mouth At Bedtime 30 tablet 1     Multiple Vitamins-Minerals (PRESERVISION AREDS PO) Take 1 tablet by mouth daily        nitroglycerin (NITROSTAT) 0.4 MG SL tablet Place 1 tablet (0.4 mg) under the tongue every 5 minutes as needed for chest pain 25 tablet 0     order for DME Dispense SP Walker-small 1 each 0     order for DME Equipment being ordered: Dispense baffle, for use with nebulizer. 1 each 0     order for DME Equipment being ordered: Nebulizer. Use with Albuterol. 1 each 0     order for DME Equipment being ordered: Dispense face mask.  Mrs. Spicer is immunosuppressed due to rheumatoid arthritis. 1 Box 11     ranibizumab (LUCENTIS) 0.3 MG/0.05ML SOLN 0.3 mg by Intravitreal route Every 6 weeks       ranitidine (ZANTAC) 150 MG tablet Take 1 tablet (150 mg) by mouth 2 times daily 60 tablet 5     saccharomyces boulardii (FLORASTOR) 250 MG capsule Take 250 mg by  "mouth daily       senna-docusate (SENOKOT-S;PERICOLACE) 8.6-50 MG per tablet Take 2 tablets by mouth At Bedtime Hold if diarrhea occurs. 120 tablet 11     STATIN NOT PRESCRIBED, INTENTIONAL, Please choose reason not prescribed, below         PAST SURGICAL HISTORY:  Past Surgical History:   Procedure Laterality Date     ANESTHESIA CARDIOVERSION N/A 9/14/2018    Procedure: ANESTHESIA CARDIOVERSION;;  Surgeon: GENERIC ANESTHESIA PROVIDER;  Location: [facility] OR     APPENDECTOMY       BIOPSY      hemorrhoidectomy     ENT SURGERY      tonsillectomy     GYN SURGERY      3 D & C's     HYSTERECTOMY, PAP NO LONGER INDICATED       LAMINECTOMY LUMBAR ONE LEVEL N/A 10/13/2015    Procedure: LAMINECTOMY LUMBAR ONE LEVEL;  Surgeon: Fransico Toussaint MD;  Location: [facility] OR       ALLERGIES:     Allergies   Allergen Reactions     Cephalexin Hcl Diarrhea     Gabapentin Other (See Comments)     Dizzsiness     Naproxen GI Disturbance     Perfume      Lactase Other (See Comments)     Macrobid [Nitrofurantoin Anhydrous]      Possibly related to lung disease      Seasonal Allergies      Sulfa Drugs      Throat swelling     Xanax [Alprazolam] Other (See Comments)     Dizziness      Ciprofloxacin Itching and Rash       FAMILY HISTORY:  Family History   Problem Relation Age of Onset     Hypertension Mother      Psychotic Disorder Father      Diabetes Son      Diabetes Daughter      Blood Disease Daughter        SOCIAL HISTORY:  Social History   Substance Use Topics     Smoking status: Never Smoker     Smokeless tobacco: Never Used     Alcohol use Yes      Comment: rare wine        ROS:   A comprehensive 10 point review of systems negative other than as mentioned in HPI.  Exam:  /82 (BP Location: Left arm, Patient Position: Chair, Cuff Size: Adult Regular)  Pulse 61  Ht 1.6 m (5' 3\")  Wt 70.8 kg (156 lb)  LMP  (LMP Unknown)  SpO2 98%  BMI 27.63 kg/m2  GENERAL APPEARANCE: alert and no distress  HEENT: no icterus, no xanthelasmas, " normal pupil size and reaction, normal palate, mucosa moist, no central cyanosis  NECK: no adenopathy, no asymmetry, masses, or scars, thyroid normal to palpation and no bruits, JVP not elevated  RESPIRATORY: lungs clear to auscultation - no rales, rhonchi or wheezes, no use of accessory muscles, no retractions, respirations are unlabored, normal respiratory rate  CARDIOVASCULAR: regular rhythm, normal S1 with physiologic split S2, no S3 or S4 and no murmur, click or rub, precordium quiet with normal PMI.  ABDOMEN: soft, non tender, bowel sounds normal, non-distended  EXTREMITIES: peripheral pulses normal, no edema  NEURO: alert and oriented to person/place/time, normal speech, gait and affect  SKIN: no ecchymoses, no rashes  PSYCH: normal affect, cooperative    Labs:  Reviewed.     Testing/Procedures:  PULMONARY FUNCTION TESTS:   PFT Latest Ref Rng & Units 2/14/2018   FVC L 1.85   FEV1 L 1.40   FVC% % 80   FEV1% % 80         6/2018 ECHOCARDIOGRAM (OS[facility] REPORT):   Interpretation Summary    * The left ventricular systolic function is normal, estimated LVEF 65-70%.    * The left atrium is mildly dilated.    * There is mild concentric left ventricular hypertrophy.    * Sigmoid septum.    * pacemaker wire is in the right ventricle.    * There is mild aortic regurgitation.    * Compared to 02/07/2017 TTE, no major changes noted.      Assessment and Plan:   Ms. 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 She was referred for management of AF/AFL.   She was first diagnosed with AFL in 8/2016 and has has several recurrences over the years and developed tachy/lisa syndrome and had PPM implanted in 2017. Afterwards, she was restarted on Toprol XL and propofenone was added. She continued to have symptomatic  recurrences with multiple ER visits and DCCVs in 2018 and recently on 18. She went back into AF about 1.5 days later with recurrence of symptoms. She followed up with Dr. Loza on 18 and she continued to feel SOB, RUSSELL, fatigue, and some lightheadedness.  Decision was made to stop propafenone for 72 hours and start multaq 400 mg BID thereafter and scheduled DCCV. She then called and requested appointment with EP due to ongoing symptoms and lack of insurance coverage for multaq. She also has a cardiac history of HFpEF and has been on cozaar and metoprolol for this. Last echo in 2016 showed LVEF 65%, mild RV enlargement and mildly decreased RV function, diastolic dysfunction, moderate MR, moderately dilated LA, and evidence of pulmonary HTN. She does have known rheumatoid pulmonary fibrosis dating back to . She had PFTs in  that showed moderate lung diffusion defect. She has followed with Pulmonary Dr. Comer and had previously had sever dyspnea and hyperventilation in the past now showing significant improvement in ventilatory control and symptoms with improvement in PFTs and exercise tolerance. She has been tolerating low dose BB with her pulmonary status. She presents today for AF management. She reports feeling extremely tired, decreased stamina, and RUSSELL which worsened with the onset of AF. Device interrogation shows normal pacemaker function. She went back into AF on 18 @ 0642.  Patient states that after she took a shower this morning she felt very SOB, fatigued and experienced burning in her neck and chest.  Patient states she is feeling a little better since resting.  AP = 0.6%.   = 79.4%.  Presenting 12 lead ECG shows  underlying AF Vent Rate 60 bpm,  ms, QTc 498 ms. Current cardiac medications include; Toprol XL, propafenone, hydrochlorothiazide, cozaar, and Eliquis.     We discussed in detail with the patient management/treatment options for Donato includin. Stroke  Prophylaxis:  CHADSVASC=++age, +gender, +HTN  3, corresponding to a 3.2% annual stroke / systemic emolism event rate. indicating need for long term oral anticoagulation.  She is appropriately on Eliquis which has been renally dosed due to age, hx of variable renal function. She has had two mechanical falls resulting in bone fractures over the last year; however, no bleeding issues.   2. Rate Control: Appears to be adequately rate controlled. Continue Toprol XL, can consider changing to diltiazem if BB an issue with her pulmonary disease; however, has tolerated well thus far.   3. Rhythm Control: Cardioversion, Antiarrhythmics and/or ablation are options for rhythm control. She has been reported to have AFL (tracings not available to us) and device interrogation now c/w AF. She has been on propafenone since 2017 with continued recurrences. Originally, multaq was going to be tried, but cost prohibitive as not covered by her insurance. Limited AAT options given variable renal function (prefer to avoid Sotalol or dofetilide). Therefore, we will have stop propafenone for 48 hours prior to starting amiodarone.  400 mg BID X1 week, then 200 mg BID X1 week, then 200 mg daily thereafter. If she does not chemically cardiovert, we will arrange for DCCV. We will have her send in a remote device transmission in 2 weeks to confirm if she is in AF or note after some amiodarone loading. We will have to monitor her lung function closely when on amiodarone. She will require PFTs at starting and then would monitor again in 6 months then at least annually. She will require LFTs/TFTs at baseline and every 6 months.     4. Risk Factor Management: maintain tight BP control and JESUS MANUEL evaluation as indicated.      Follow up in 3 months.     The patient states understanding and is agreeable with plan.   This patient was seen and evaluated with Dr. Carrasquillo. The above note reflects our joint assessment and plan.   SUNNI Ortiz CNP  Pager:  [phone]    EP STAFF NOTE  I have seen and examined the patient as part of a shared visit with RAAD Ortiz NP.  I agree with the note above. I reviewed today's vital signs and medications. I have reviewed and discussed with the advanced practice provider their physical examination, assessment, and plan   Briefly, persistent AF, symptomatic despite no RVR and RV pacing, very little options from medical standpoint, propafenone not effective  My key history/exam findings are: AF.   The key management decisions made by me: amiodarone, high risk of stroke and bleeding with ablation given her age.    Franki Carrasquillo MD [facility]  Cardiology - Electrophysiology    CC  VOCAL, ELENA AVINA

## 2018-09-19 NOTE — MR AVS SNAPSHOT
After Visit Summary   9/19/2018    Linda Spicer    MRN: [id number]           Patient Information     Date Of Birth          6/13/1931        Visit Information        Provider Department      9/19/2018 6:00 AM UC ICD REMOTE [facility]        Today's Diagnoses     Sick sinus syndrome (H)    -  1       Follow-ups after your visit        Your next 10 appointments already scheduled     Sep 19, 2018  1:00 PM CDT   (Arrive by 12:45 PM)   NEW ARRHYTHMIA with Franki Carrasquillo MD   [facility] ([facility])    [address] [place] [address]  [address] [place] [id number]   [phone]            Oct 11, 2018  9:00 AM CDT   Level 1 with [place] [facility] ([facility] [facility])    [id number] [place] [id number]   [phone]            Nov 08, 2018  9:30 AM CST   Level 1 with B[facility] 9 INFUSION   [facility] ([facility])    [id number] [place] [id number] [phone]            Dec 11, 2018 10:00 AM CST   Office Visit with PFT LAB   [facility] ([facility])    [id number] [place] [id number] [phone]           Bring a current list of meds and any records pertaining to this visit. For Physicals, please bring immunization records and any forms needing to be filled out. Please arrive 10 minutes early to complete paperwork.            Dec 11, 2018 11:00 AM CST   Return Visit with Dea Acosta MD   [facility] ([facility])    [id number] [place] [phone]   [phone]            Jim 15, 2019  2:40 PM CST   Return Visit with Mario Davenport MD   [facility]    [address] [place] [id number]   [phone]            Feb 18, 2019  2:10 PM CST   Return Visit with Emeterio Loza MD   [facility] ([facility])    [address] [address] [place] [id number] [phone]            Mar 01, 2019 10:00 AM CST   Return Visit with Asia Steven PA-C   [facility] ([facility])    [address] [address] [place] [id number] [phone]              Who to contact     If you have questions or need follow up information about today's clinic visit or your schedule please contact [facility] directly at [phone].  Normal or non-critical lab and imaging results will be communicated to you by Naurexhart, letter or phone within 4 business days after the clinic has received the results. If you do not hear from us within 7 days, please contact the clinic through OwnerListenst or phone. If you have a critical or abnormal lab result, we will notify you by phone as soon as possible.  Submit refill requests through [employer] or call your pharmacy and they will forward the refill request to us. Please allow 3 business days for your refill to be completed.          Additional Information About Your Visit        Naurexhart Information     [employer] gives you secure access to your electronic health record. If you see a primary care provider, you can also send messages to your care team and make appointments. If you have questions, please call your primary care clinic.  If you do not have a primary care provider, please call [phone] and they will assist you.        Care EveryWhere ID     This is your Care EveryWhere ID. This could be used by other organizations to access your [place] medical records  [id number]        Your Vitals Were     Last Period                   (LMP Unknown)            Blood Pressure from Last 3 Encounters:   09/17/18 113/70   09/14/18 189/69   09/14/18 163/77    Weight from Last 3 Encounters:   09/17/18 72.1 kg (159 lb)   09/14/18 71.5 kg (157 lb 9.6 oz)   09/13/18 76.8 kg (169 lb 4.8 oz)              We Performed the  Following     INTERROGATION DEVICE EVAL REMOTE, PACER/ICD        Primary Care Provider Office Phone # Fax #    Tre Lockett [phone]       [address]  [facility] [address]        Goals        General    #1 Medication  (pt-stated)     Notes - Note created  8/21/2018 10:37 AM by Behl, Melissa K, RN    Goal Statement: I will take my medications as prescribed.  Measure of Success: Patient and home care will report consistently taking medication as prescribed.  Supportive Steps to Achieve: RN CC notified home care RN of medication error.  Barriers: multiple complex chronic health conditions  Strengths: has home care, care coordination and excellent family support  Date to Achieve By: 9/21/18  Patient expressed understanding of goal: yes         #2 I will complete my health care directive. (pt-stated)     Notes - Note edited  8/21/2018 10:39 AM by Behl, Melissa K, RN    Goal Statement: I will complete my health care directive.  Measure of Success: Health care directive will be completed and scanned into chart.  Supportive Steps to Achieve: Patient has attended a health care directive class, 10/24/17 informed of CPR vs intubation  Barriers: is awaiting to see her  to finalize document  Strengths: has care coordination, home care and excellent family support  Date to Achieve By: 12/31/18  Patient expressed understanding of goal: yes               Equal Access to Services     [facility]: Hadii rakesh schuster hadasho Soomaali, waaxda luqadaha, qaybta kaalmada adeegyada, waxay marisabel hayemelina moss . So [facility] [phone].    ATENCIÓN: Si habla español, tiene a merchant disposición servicios gratuitos de asistencia lingüística. Llame al [phone].    We comply with applicable federal civil rights laws and [place] laws. We do not discriminate on the basis of race, color, national origin, age, disability, sex, sexual orientation, or gender identity.            Thank you!     Thank  you for choosing [facility]  for your care. Our goal is always to provide you with excellent care. Hearing back from our patients is one way we can continue to improve our services. Please take a few minutes to complete the written survey that you may receive in the mail after your visit with us. Thank you!             Your Updated Medication List - Protect others around you: Learn how to safely use, store and throw away your medicines at www.disposemymeds.org.          This list is accurate as of 9/19/18 10:33 AM.  Always use your most recent med list.                   Brand Name Dispense Instructions for use Diagnosis    ACETAMINOPHEN PO      Take 1,000 mg by mouth 2 times daily as needed        albuterol (2.5 MG/3ML) 0.083% neb solution      Take 1 vial by nebulization every 6 hours as needed for shortness of breath / dyspnea or wheezing        apixaban ANTICOAGULANT 2.5 MG tablet    ELIQUIS    180 tablet    Take 1 tablet (2.5 mg) by mouth 2 times daily    Atrial flutter, paroxysmal (H)       azaTHIOprine 50 MG tablet    IMURAN    90 tablet    Take 1 tablet (50 mg) by mouth daily    Rheumatoid arthritis involving multiple sites with positive rheumatoid factor (H)       Blood Pressure Monitor Kit     1 kit    1 kit daily as needed    CHF (congestive heart failure) (H)       calcium carbonate 600 mg-vitamin D 400 units 600-400 MG-UNIT per tablet    CALTRATE     Take 1 tablet by mouth 2 times daily        COMPOUNDED NON-CONTROLLED SUBSTANCE - PHARMACY TO MIX COMPOUNDED MEDICATION    CMPD RX    30 g    Estriol 1mg/gram. Place 1 gram vaginally daily for two weeks, then vaginally twice weekly after.    Atrophic vaginitis       dronedarone 400 MG Tabs tablet    MULTAQ    60 tablet    Take 1 tablet (400 mg) by mouth 2 times daily (with meals)    Paroxysmal atrial fibrillation (H)       fish oil-omega-3 fatty acids 1000 MG capsule      Take 2 g by mouth daily. 2 capsules daily        folic acid 1 MG tablet     FOLVITE    90 tablet    Take 1 tablet (1 mg) by mouth daily    Oral aphthae       GENTEAL MILD OP      Apply  to eye daily.        hydrochlorothiazide 25 MG tablet    HYDRODIURIL    30 tablet    Take 1 tablet (25 mg) by mouth daily    Hypertension goal BP (blood pressure) < 150/90       hydrocortisone 2.5 % cream     30 g    Apply BID to affected region(s) for 7-10 days.    Rash       levothyroxine 75 MCG tablet    SYNTHROID/LEVOTHROID    90 tablet    TAKE ONE TABLET BY MOUTH ONCE DAILY    Hypothyroidism, unspecified type       loratadine 10 MG tablet    CLARITIN     Take 10 mg by mouth every morning        losartan 50 MG tablet    COZAAR    180 tablet    Take 1 tablet (50 mg) by mouth 2 times daily    Heart failure with preserved ejection fraction, NYHA class I (H), Hypertension goal BP (blood pressure) < 130/80       metoprolol succinate 25 MG 24 hr tablet    TOPROL-XL    90 tablet    Take 1 tablet (25 mg) by mouth daily    Hypertension goal BP (blood pressure) < 140/90       mirtazapine 15 MG tablet    REMERON    30 tablet    Take 1 tablet (15 mg) by mouth At Bedtime    Adjustment insomnia       nitroGLYcerin 0.4 MG sublingual tablet    NITROSTAT    25 tablet    Place 1 tablet (0.4 mg) under the tongue every 5 minutes as needed for chest pain    Chest pain       * order for DME     1 Box    Equipment being ordered: Dispense face mask. Mrs. Spicer is immunosuppressed due to rheumatoid arthritis.    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 Use with Albuterol.    Hypoxia       order for DME     1 each    Equipment being ordered: Dispense baffle, for use with nebulizer.    Pneumonia of left upper lobe due to Mycoplasma pneumoniae       * order for DME     1 each    Dispense SP Walker-small    Pain of toe of right foot, Status post bunionectomy       PRESERVISION AREDS PO      Take 1 tablet by mouth daily         ranibizumab 0.3 MG/0.05ML Soln    LUCENTIS     0.3 mg by Intravitreal route Every 6 weeks        ranitidine 150 MG tablet    ZANTAC    60 tablet    Take 1 tablet (150 mg) by mouth 2 times daily    Gastroesophageal reflux disease without esophagitis       REFRESH P.M. Oint      Apply  to eye. Daily at bedtime        saccharomyces boulardii 250 MG capsule    FLORASTOR     Take 250 mg by mouth daily        senna-docusate 8.6-50 MG per tablet    SENOKOT-S;PERICOLACE    120 tablet    Take 2 tablets by mouth At Bedtime Hold if diarrhea occurs.    Constipation, chronic       STATIN NOT PRESCRIBED (INTENTIONAL)      Please choose reason not prescribed, below    Hyperlipidemia LDL goal <100       VITAMIN C PO      Take 500 mg by mouth daily        ZYRTEC ALLERGY 10 MG tablet   Generic drug:  cetirizine      Take 10 mg by mouth At Bedtime        * Notice:  This list has 3 medication(s) that are the same as other medications prescribed for you. Read the directions carefully, and ask your doctor or other care provider to review them with you.

## 2018-09-19 NOTE — MR AVS SNAPSHOT
After Visit Summary   9/19/2018    Linda Spicer    MRN: [id number]           Patient Information     Date Of Birth          6/13/1931        Visit Information        Provider Department      9/19/2018 1:00 PM Franki Carrasquillo MD [facility]        Today's Diagnoses     Paroxysmal atrial fibrillation (H)    -  1    On amiodarone therapy          Care Instructions    Cardiology Provider you saw in clinic today: Dr. Carrasquillo    Medication Changes:     1. Start amiodarone:    - 400mg twice a day for 1 week, then    - 200mg twice a day for 1 week, then    - 200mg daily   2. Stop propafenone 48 hours before starting amiodarone     Follow-up Visit:    Send remote transmission in 2 weeks on October 5th, if you are still in atrial fibrillation, we will schedule you for a cardioversion. See Dr. Carrasquillo in 3 months with labs and pulmonary function tests.      Please contact us via [email or web address] for questions or concerns.    Karol Perez RN   Electrophysiology Nurse Coordinator.  [phone]    If your question concerns the schedule/appointment times, contact:  [facility]   [phone]    Device Clinic (Pacemakers, ICD, Loop Recorders)   [phone]      You will receive all normal lab and testing results via [email or web address] or mail if not reviewed in clinic today.   If you need a medication refill please contact your pharmacy.    As always, thank you for trusting us with your health care needs!            Follow-ups after your visit        Additional Services     Follow-Up with Electrophysiologist                 Your next 10 appointments already scheduled     Oct 11, 2018  9:00 AM CDT   Level 1 with Gray 7 [facility])    [address] [place] [address]   [phone]            Nov 08, 2018  9:30 AM CST   Level 1 with Gray 9 INFUSION   [facility])    [address] [place] [address]  [facility] [address]   [phone]            Dec 11, 2018 10:00 AM CST   Office Visit with PFT LAB   [facility] ([facility])    [address] [facility] [address]   [phone]           Bring a current list of meds and any records pertaining to this visit. For Physicals, please bring immunization records and any forms needing to be filled out. Please arrive 10 minutes early to complete paperwork.            Dec 11, 2018 11:00 AM CST   Return Visit with Dea Acosta MD   [facility] ([facility])    [address] [facility] [address]   [phone]            Dec 19, 2018 11:30 AM CST   Lab with UC LAB   [facility] ([facility])    [address]  1st [address]  [facility] [address]   [phone]            Dec 19, 2018 12:00 PM CST   PFT VISIT with  PFL B   [facility] Pulmonary Function Testing ([facility])    [address]  3rd Floor  [facility] [address]   [phone]            Dec 19, 2018  1:00 PM CST   (Arrive by 12:45 PM)   RETURN ARRHYTHMIA with Franki Carrasquillo MD   [facility] Heart [facility] ([facility])    [address]  [address] [place] [address]   [phone]            Jim 15, 2019  2:40 PM CST   Return Visit with Mario Davenport MD   [facility] ([facility])    [address] [facility] [address]   [phone]            Feb 18, 2019  2:10 PM CST   Return Visit with Emeterio Loza MD   [facility] ([facility])    [address] [place] [address]   [phone]            Mar 01, 2019 10:00 AM CST   Return Visit with Asia Steven PA-C   [facility] ([facility])    [address] [place] [address]   [phone]             "  Future tests that were ordered for you today     Open Future Orders        Priority Expected Expires Ordered    Hepatic panel STAT 12/19/2018 9/19/2019 9/19/2018    TSH with free T4 reflex STAT 12/19/2018 9/19/2019 9/19/2018    Pulmonary Function Test Routine 12/19/2018 9/19/2019 9/19/2018    Follow-Up with Electrophysiologist Routine 12/19/2018 9/19/2019 9/19/2018            Who to contact     If you have questions or need follow up information about today's clinic visit or your schedule please contact [facility] directly at [phone].  Normal or non-critical lab and imaging results will be communicated to you by [employer], letter or phone within 4 business days after the clinic has received the results. If you do not hear from us within 7 days, please contact the clinic through [employer] or phone. If you have a critical or abnormal lab result, we will notify you by phone as soon as possible.  Submit refill requests through [employer] or call your pharmacy and they will forward the refill request to us. Please allow 3 business days for your refill to be completed.          Additional Information About Your Visit        [employer] Information     [employer] gives you secure access to your electronic health record. If you see a primary care provider, you can also send messages to your care team and make appointments. If you have questions, please call your primary care clinic.  If you do not have a primary care provider, please call [phone] and they will assist you.        Care EveryWhere ID     This is your Care EveryWhere ID. This could be used by other organizations to access your [place] medical records  [id number]        Your Vitals Were     Pulse Height Last Period Pulse Oximetry BMI (Body Mass Index)       61 1.6 m (5' 3\") (LMP Unknown) 98% 27.63 kg/m2        Blood Pressure from Last 3 Encounters:   09/19/18 128/82   09/17/18 113/70   09/14/18 189/69    Weight from Last 3 Encounters:   09/19/18 70.8 " kg (156 lb)   09/17/18 72.1 kg (159 lb)   09/14/18 71.5 kg (157 lb 9.6 oz)              We Performed the Following     EKG 12-lead, tracing only (Same Day)          Today's Medication Changes          These changes are accurate as of 9/19/18  2:31 PM.  If you have any questions, ask your nurse or doctor.               Start taking these medicines.        Dose/Directions    amiodarone 200 MG tablet   Commonly known as:  PACERONE/CODARONE   Used for:  Paroxysmal atrial fibrillation (H)   Started by:  Franki Carrasquillo MD        1st week: 400mg twice a day. 2nd week: 200mg twice a day. Then 200mg daily   Quantity:  120 tablet   Refills:  3            Where to get your medicines      These medications were sent to [facility] [address] [place] [address]     Phone:  [phone]     amiodarone 200 MG tablet                Primary Care Provider Office Phone # Fax #    Tre Lockett [phone]       [address] [facility] [address]        Goals        General    #1 Medication  (pt-stated)     Notes - Note created  8/21/2018 10:37 AM by Behl, Melissa K, RN    Goal Statement: I will take my medications as prescribed.  Measure of Success: Patient and home care will report consistently taking medication as prescribed.  Supportive Steps to Achieve: RN CC notified home care RN of medication error.  Barriers: multiple complex chronic health conditions  Strengths: has home care, care coordination and excellent family support  Date to Achieve By: 9/21/18  Patient expressed understanding of goal: yes         #2 I will complete my health care directive. (pt-stated)     Notes - Note edited  8/21/2018 10:39 AM by Behl, Melissa K, RN    Goal Statement: I will complete my health care directive.  Measure of Success: Health care directive will be completed and scanned into chart.  Supportive Steps to Achieve: Patient has attended a health  care directive class, 10/24/17 informed of CPR vs intubation  Barriers: is awaiting to see her  to finalize document  Strengths: has care coordination, home care and excellent family support  Date to Achieve By: 12/31/18  Patient expressed understanding of goal: yes               Equal Access to Services     [facility] : Hadii aad ku hadmyrajason Sojordin, waaxda luqadaha, qaybta kaalmada adelashon, waxcameron marisabel angelitacapri quezadairmaaddy moss . So [facility] [phone].    ATENCIÓN: Si habla español, tiene a merchant disposición servicios gratuitos de asistencia lingüística. Llame al [phone].    We comply with applicable federal civil rights laws and [place] laws. We do not discriminate on the basis of race, color, national origin, age, disability, sex, sexual orientation, or gender identity.            Thank you!     Thank you for choosing [facility]  for your care. Our goal is always to provide you with excellent care. Hearing back from our patients is one way we can continue to improve our services. Please take a few minutes to complete the written survey that you may receive in the mail after your visit with us. Thank you!             Your Updated Medication List - Protect others around you: Learn how to safely use, store and throw away your medicines at www.disposemymeds.org.          This list is accurate as of 9/19/18  2:31 PM.  Always use your most recent med list.                   Brand Name Dispense Instructions for use Diagnosis    ACETAMINOPHEN PO      Take 1,000 mg by mouth 2 times daily as needed        albuterol (2.5 MG/3ML) 0.083% neb solution      Take 1 vial by nebulization every 6 hours as needed for shortness of breath / dyspnea or wheezing        amiodarone 200 MG tablet    PACERONE/CODARONE    120 tablet    1st week: 400mg twice a day. 2nd week: 200mg twice a day. Then 200mg daily    Paroxysmal atrial fibrillation (H)       apixaban ANTICOAGULANT 2.5 MG tablet    ELIQUIS    180 tablet     Take 1 tablet (2.5 mg) by mouth 2 times daily    Atrial flutter, paroxysmal (H)       azaTHIOprine 50 MG tablet    IMURAN    90 tablet    Take 1 tablet (50 mg) by mouth daily    Rheumatoid arthritis involving multiple sites with positive rheumatoid factor (H)       Blood Pressure Monitor Kit     1 kit    1 kit daily as needed    CHF (congestive heart failure) (H)       calcium carbonate 600 mg-vitamin D 400 units 600-400 MG-UNIT per tablet    CALTRATE     Take 1 tablet by mouth 2 times daily        COMPOUNDED NON-CONTROLLED SUBSTANCE - PHARMACY TO MIX COMPOUNDED MEDICATION    CMPD RX    30 g    Estriol 1mg/gram. Place 1 gram vaginally daily for two weeks, then vaginally twice weekly after.    Atrophic vaginitis       dronedarone 400 MG Tabs tablet    MULTAQ    60 tablet    Take 1 tablet (400 mg) by mouth 2 times daily (with meals)    Paroxysmal atrial fibrillation (H)       fish oil-omega-3 fatty acids 1000 MG capsule      Take 2 g by mouth daily. 2 capsules daily        folic acid 1 MG tablet    FOLVITE    90 tablet    Take 1 tablet (1 mg) by mouth daily    Oral aphthae       GENTEAL MILD OP      Apply  to eye daily.        hydrochlorothiazide 25 MG tablet    HYDRODIURIL    30 tablet    Take 1 tablet (25 mg) by mouth daily    Hypertension goal BP (blood pressure) < 150/90       hydrocortisone 2.5 % cream     30 g    Apply BID to affected region(s) for 7-10 days.    Rash       levothyroxine 75 MCG tablet    SYNTHROID/LEVOTHROID    90 tablet    TAKE ONE TABLET BY MOUTH ONCE DAILY    Hypothyroidism, unspecified type       loratadine 10 MG tablet    CLARITIN     Take 10 mg by mouth every morning        losartan 50 MG tablet    COZAAR    180 tablet    Take 1 tablet (50 mg) by mouth 2 times daily    Heart failure with preserved ejection fraction, NYHA class I (H), Hypertension goal BP (blood pressure) < 130/80       metoprolol succinate 25 MG 24 hr tablet    TOPROL-XL    90 tablet    Take 1 tablet (25 mg) by mouth daily     Hypertension goal BP (blood pressure) < 140/90       mirtazapine 15 MG tablet    REMERON    30 tablet    Take 1 tablet (15 mg) by mouth At Bedtime    Adjustment insomnia       nitroGLYcerin 0.4 MG sublingual tablet    NITROSTAT    25 tablet    Place 1 tablet (0.4 mg) under the tongue every 5 minutes as needed for chest pain    Chest pain       * order for DME     1 Box    Equipment being ordered: Dispense face mask. Mrs. Spicer is immunosuppressed due to rheumatoid arthritis.    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 Use with Albuterol.    Hypoxia       order for DME     1 each    Equipment being ordered: Dispense baffle, for use with nebulizer.    Pneumonia of left upper lobe due to Mycoplasma pneumoniae       * order for DME     1 each    Dispense SP Walker-small    Pain of toe of right foot, Status post bunionectomy       PRESERVISION AREDS PO      Take 1 tablet by mouth daily        ranibizumab 0.3 MG/0.05ML Soln    LUCENTIS     0.3 mg by Intravitreal route Every 6 weeks        ranitidine 150 MG tablet    ZANTAC    60 tablet    Take 1 tablet (150 mg) by mouth 2 times daily    Gastroesophageal reflux disease without esophagitis       REFRESH P.M. Oint      Apply  to eye. Daily at bedtime        saccharomyces boulardii 250 MG capsule    FLORASTOR     Take 250 mg by mouth daily        senna-docusate 8.6-50 MG per tablet    SENOKOT-S;PERICOLACE    120 tablet    Take 2 tablets by mouth At Bedtime Hold if diarrhea occurs.    Constipation, chronic       STATIN NOT PRESCRIBED (INTENTIONAL)      Please choose reason not prescribed, below    Hyperlipidemia LDL goal <100       VITAMIN C PO      Take 500 mg by mouth daily        ZYRTEC ALLERGY 10 MG tablet   Generic drug:  cetirizine      Take 10 mg by mouth At Bedtime        * Notice:  This list has 3 medication(s) that are the same as other medications  prescribed for you. Read the directions carefully, and ask your doctor or other care provider to review them with you.

## 2018-09-19 NOTE — PATIENT INSTRUCTIONS
Cardiology Provider you saw in clinic today: Dr. Carrasquillo    Medication Changes:     1. Start amiodarone:    - 400mg twice a day for 1 week, then    - 200mg twice a day for 1 week, then    - 200mg daily   2. Stop propafenone 48 hours before starting amiodarone     Follow-up Visit:    Send remote transmission in 2 weeks on October 5th, if you are still in atrial fibrillation, we will schedule you for a cardioversion. See Dr. Carrasquillo in 3 months with labs and pulmonary function tests.      Please contact us via [employer] for questions or concerns.    Karol Perez RN   Electrophysiology Nurse Coordinator.  [phone]    If your question concerns the schedule/appointment times, contact:  [facility]   [phone]    Device Clinic (Pacemakers, ICD, Loop Recorders)   [phone]      You will receive all normal lab and testing results via [employer] or mail if not reviewed in clinic today.   If you need a medication refill please contact your pharmacy.    As always, thank you for trusting us with your health care needs!

## 2018-09-19 NOTE — TELEPHONE ENCOUNTER
Called and spoke to patient and daughter. Appointment has been made for today with Dr Penn at the [facility] for 1:00 pm. Daughter will bring patient to appointment. Address given.   Ab RN

## 2018-09-20 DIAGNOSIS — Z53.9 DIAGNOSIS NOT YET DEFINED: Primary | ICD-10-CM

## 2018-09-20 LAB — INTERPRETATION ECG - MUSE: NORMAL

## 2018-09-20 NOTE — TELEPHONE ENCOUNTER
Called Radha back and she states there are faxed orders sent for July and another one 3 weeks ago that have not been signed and faxed back. Told her to refax attn to myself    Usama Ching CMA

## 2018-09-20 NOTE — TELEPHONE ENCOUNTER
Fax is received and forward to Dr. Lockett to address.  Elmo Jimenez,  [facility]

## 2018-09-20 NOTE — TELEPHONE ENCOUNTER
returned call    Best number to reach caller: Radha [facility] [phone]    Is it ok to leave a detailed message: YES

## 2018-09-20 NOTE — TELEPHONE ENCOUNTER
Orders signed by provider and faxed back to [facility] at fax # [phone].  Elmo Jimenez,  [employer] [facility] [facility]

## 2018-09-24 ENCOUNTER — CARE COORDINATION (OUTPATIENT)
Dept: CARDIOLOGY | Facility: CLINIC | Age: 83
End: 2018-09-24

## 2018-09-24 NOTE — PROGRESS NOTES
Called and spoke to the patient.   She will restart the amiodarone per below.      Message  Received: Yesterday       Saloni Nguyen APRN CNP Kelling, Maria, RN                   Can you please call her and have her decrease her amiodarone load to 200 mg BID X2 weeks (see note below she is having some orthostatism) then she can go to 200 daily thereafter. If needed we can also back off on her Toprol Xl (but she is on low dose).   Let me know how she does with this.   Thanks,   LVW            Previous Messages       ----- Message -----      From: Marshall Damon MD      Sent: 9/23/2018  10:21 AM        To: Franki Carrasquillo MD, *     Hi Saloni Phelps. I received a call from Linda Spicer that she started amiodarone yesterday 400 mg BID and is now feeling light headed when she stands up. She asked me if it was OK if she stopped taking if for now and I told her that was fine.     I was a little worried about her being unsteady while walking around and she told me she felt safe at home, she was more concerned about discontinuing the medication. I asked her to have her daughter come stay with her for the day and to have her come in to the ED if she is feeling any worse, and she said that she would.     She said she is going to try to follow up with you tomorrow regarding the medication, would you be able to have one of your staff members give her a call? Thanks a bunch!     -Fransico Damon MD PhD   Cardiology Fellow   P: [phone]

## 2018-09-25 ENCOUNTER — PATIENT OUTREACH (OUTPATIENT)
Dept: CARE COORDINATION | Facility: CLINIC | Age: 83
End: 2018-09-25

## 2018-09-26 ASSESSMENT — ACTIVITIES OF DAILY LIVING (ADL)
DEPENDENT_IADLS:: COOKING;CLEANING;LAUNDRY;SHOPPING;MEAL PREPARATION;MEDICATION MANAGEMENT;MONEY MANAGEMENT;TRANSPORTATION

## 2018-09-26 NOTE — PROGRESS NOTES
Clinic Care Coordination Contact  [facility]/Voicemail       Clinical Data: Care Coordinator Outreach  Outreach attempted x 2.  Left message on voicemail with call back information and requested return call.  Plan: Care Coordinator mailed out care coordination introduction letter on 4/6/16. Care Coordinator will try to reach patient again in 3-5 business days.    Melissa Behl BSN, RN, [facility] Care Coordinator  [phone]

## 2018-09-26 NOTE — PROGRESS NOTES
Clinic Care Coordination Contact    Clinic Care Coordination Contact  OUTREACH    Referral Information:  Referral Source: Self-patient/Caregiver    Primary Diagnosis: CHF    Chief Complaint   Patient presents with     Clinic Care Coordination - Follow-up     RN        Universal Utilization: Patient is followed by rheumatology, cardiology, endocrinology and pulmonology  Clinic Utilization  Difficulty keeping appointments:: No  Compliance Concerns: No  No-Show Concerns: No  No PCP office visit in Past Year: No  Utilization    Last refreshed: 9/26/2018  1:44 PM:  No Show Count (past year) 1       Last refreshed: 9/26/2018  1:44 PM:  ED visits 0       Last refreshed: 9/26/2018  1:44 PM:  Hospital admissions 0          Current as of: 9/26/2018  1:44 PM             Clinical Concerns:  Current Medical Concerns:  Patient continues to work with cardiology regarding her A-fib.  Patient remains in home care services and is working on medication management.  Patient states she has a family member with her on and off every day.  Patient states physical therapy is going to wait a week or two prior to coming back out due to patient's A-fib/cardiac status being figured out.  Patient Active Problem List   Diagnosis     ACP (advance care planning)     Hypertension goal BP (blood pressure) < 150/90     Hypothyroidism     Macular degeneration, left eye     Irritable bowel syndrome     Encounter for palliative care     Adjustment disorder with anxious mood     Mild anemia     DDD (degenerative disc disease), lumbar     CKD (chronic kidney disease) stage 3, GFR 30-59 ml/min     History of blood transfusion     Aftercare following surgery     S/P lumbar laminectomy     High risk medication use     Age-related osteoporosis without current pathological fracture     Atrophic vaginitis     Fecal incontinence     Female stress incontinence     Impingement syndrome of both shoulders     UIP (usual interstitial pneumonitis) (H)     High risk  medications (not anticoagulants) long-term use     Heart failure with preserved ejection fraction (H)     Congestive heart failure with preserved LV function, NYHA class 3 (H)     Other specified hypothyroidism     Rheumatoid arthritis involving multiple sites with positive rheumatoid factor (H)     Health Care Home     Sick sinus syndrome (H)     Cardiac pacemaker - Medtronic dual lead pacemaker - Not Dependent - MRI Safe     Immunosuppression (H)     ILD (interstitial lung disease) (H)     Heart failure with preserved ejection fraction, NYHA class I (H)     Atrial flutter (H)     Paroxysmal atrial fibrillation (H)          Current Behavioral Concerns: n/a      Education Provided to patient: RN CC reviewed goals.   Pain  Pain (GOAL):: No  Health Maintenance Reviewed: Due/Overdue   Health Maintenance Due   Topic Date Due     MICROALBUMIN Q1 YEAR  07/26/2018     INFLUENZA VACCINE (1) 09/01/2018      Clinical Pathway: None    Medication Management:  Patient has home care and family setting up medications and helping manage.  Patient is currently on amiodarone, but states she may be switching to a new medication since the prior authorization has been approved.     Functional Status:  Dependent ADLs:: Ambulation-no assistive device, Bathing  Dependent IADLs:: Cooking, Cleaning, Laundry, Shopping, Meal Preparation, Medication Management, Money Management, Transportation  Bed or wheelchair confined:: No  Mobility Status: Independent  Fallen 2 or more times in the past year?: No  Any fall with injury in the past year?: Yes    Living Situation:  Current living arrangement:: I live in a private home with spouse  Type of residence:: Apartment    Diet/Exercise/Sleep:  Diet:: Low cholesterol, Low saturated fat, No added salt  Inadequate nutrition (GOAL):: No  Food Insecurity: No  Tube Feeding: No  Exercise:: Currently not exercising  Inadequate activity/exercise (GOAL):: No  Significant changes in sleep pattern (GOAL):  No    Transportation:  Transportation concerns (GOAL):: No  Transportation means:: Family, [place] mobility     Psychosocial:  Rastafari or spiritual beliefs that impact treatment:: No  Mental health DX:: No  Mental health management concern (GOAL):: No  Informal Support system:: [place] based, Family, Friends, Neighbors, Spouse     Financial/Insurance:   Financial/Insurance concerns (GOAL):: No       Resources and Interventions:  Current Resources:   List of home care services:: Skilled Nursing, Home Health Aid, Physicial Therapy;   Community Resources: Home Care  Supplies used at home:: None  Equipment Currently Used at Home: raised toilet, shower chair    Advance Care Plan/Directive  Advanced Care Plans/Directives on file:: In process  Advanced Care Plan/Directive Status: In Process    Referrals Placed: None     Goals:   Goals        General    #1 Medication  (pt-stated)     Notes - Note created  8/21/2018 10:37 AM by Behl, Melissa K, RN    Goal Statement: I will take my medications as prescribed.  Measure of Success: Patient and home care will report consistently taking medication as prescribed.  Supportive Steps to Achieve: RN CC notified home care RN of medication error.  Barriers: multiple complex chronic health conditions  Strengths: has home care, care coordination and excellent family support  Date to Achieve By: 9/21/18  Patient expressed understanding of goal: yes         #2 I will complete my health care directive. (pt-stated)     Notes - Note edited  8/21/2018 10:39 AM by Behl, Melissa K, RN    Goal Statement: I will complete my health care directive.  Measure of Success: Health care directive will be completed and scanned into chart.  Supportive Steps to Achieve: Patient has attended a health care directive class, 10/24/17 informed of CPR vs intubation  Barriers: is awaiting to see her  to finalize document  Strengths: has care coordination, home care and excellent family support  Date to Achieve  By: 12/31/18  Patient expressed understanding of goal: yes                     Patient/Caregiver understanding: Patient has fair understanding of her current plan of care, but will need ongoing care coordination support due to the recent passing of her spouse.    Outreach Frequency: monthly  Future Appointments              In 2 weeks [place] [facility]    In 1 month [place] [facility]    In 2 months PFT [facility]    In 2 months Dea Acosta MD [facility]    In 2 months UC LAB [facility], [facility]    In 2 months  PFL B [facility] Pulmonary Function Testing, [facility]    In 2 months [facility]    In 3 months Mario Davenport MD [facility]    In 4 months Emeterio Loza MD [facility]    In 5 months Asia Steven PA-C [facility]          Plan:   1. Patient will continue to follow treatment plan as directed and follow up with PCP and cardiology with concerns ongoing.   2. Patient will continue to work on updating health care directive.  3. RN CC will follow up with patient in 2-3 weeks.    Melissa Behl BSN, RN, N  [facility] Care Coordinator  [phone]

## 2018-09-27 ENCOUNTER — ALLIED HEALTH/NURSE VISIT (OUTPATIENT)
Dept: NURSING | Facility: CLINIC | Age: 83
End: 2018-09-27
Payer: MEDICARE

## 2018-09-27 ENCOUNTER — TELEPHONE (OUTPATIENT)
Dept: UROLOGY | Facility: CLINIC | Age: 83
End: 2018-09-27

## 2018-09-27 DIAGNOSIS — R35.0 URINARY FREQUENCY: Primary | ICD-10-CM

## 2018-09-27 DIAGNOSIS — R35.0 URINARY FREQUENCY: ICD-10-CM

## 2018-09-27 DIAGNOSIS — Z23 NEED FOR PROPHYLACTIC VACCINATION AND INOCULATION AGAINST INFLUENZA: Primary | ICD-10-CM

## 2018-09-27 DIAGNOSIS — R82.90 NONSPECIFIC FINDING ON EXAMINATION OF URINE: Primary | ICD-10-CM

## 2018-09-27 LAB
ALBUMIN UR-MCNC: NEGATIVE MG/DL
APPEARANCE UR: ABNORMAL
BACTERIA #/AREA URNS HPF: ABNORMAL /HPF
BILIRUB UR QL STRIP: NEGATIVE
COLOR UR AUTO: YELLOW
GLUCOSE UR STRIP-MCNC: NEGATIVE MG/DL
HGB UR QL STRIP: ABNORMAL
KETONES UR STRIP-MCNC: NEGATIVE MG/DL
LEUKOCYTE ESTERASE UR QL STRIP: ABNORMAL
NITRATE UR QL: POSITIVE
NON-SQ EPI CELLS #/AREA URNS LPF: ABNORMAL /LPF
PH UR STRIP: 6 PH (ref 5–7)
RBC #/AREA URNS AUTO: ABNORMAL /HPF
SOURCE: ABNORMAL
SP GR UR STRIP: 1.02 (ref 1–1.03)
UROBILINOGEN UR STRIP-ACNC: 0.2 EU/DL (ref 0.2–1)
WBC #/AREA URNS AUTO: ABNORMAL /HPF

## 2018-09-27 PROCEDURE — G0008 ADMIN INFLUENZA VIRUS VAC: HCPCS

## 2018-09-27 PROCEDURE — 99207 ZZC NO CHARGE NURSE ONLY: CPT

## 2018-09-27 PROCEDURE — 87186 SC STD MICRODIL/AGAR DIL: CPT | Performed by: PHYSICIAN ASSISTANT

## 2018-09-27 PROCEDURE — 87086 URINE CULTURE/COLONY COUNT: CPT | Performed by: PHYSICIAN ASSISTANT

## 2018-09-27 PROCEDURE — 81001 URINALYSIS AUTO W/SCOPE: CPT | Performed by: PHYSICIAN ASSISTANT

## 2018-09-27 PROCEDURE — 87088 URINE BACTERIA CULTURE: CPT | Performed by: PHYSICIAN ASSISTANT

## 2018-09-27 PROCEDURE — 90662 IIV NO PRSV INCREASED AG IM: CPT

## 2018-09-27 RX ORDER — CEFDINIR 300 MG/1
300 CAPSULE ORAL DAILY
Qty: 7 CAPSULE | Refills: 0 | Status: SHIPPED | OUTPATIENT
Start: 2018-09-27 | End: 2019-02-07

## 2018-09-27 NOTE — TELEPHONE ENCOUNTER
9/27/18 UA results positive. Attempted to reach patient by phone, but no answer. Left generic message with request for patient to return call to clinic. When patient returns call, will inform her of the 9/27/18 UA results, result note and recommendations from Asia Steven PA-C.     Sena Chapman RN, BSN

## 2018-09-27 NOTE — PROGRESS NOTES
Injectable Influenza Immunization Documentation    1.  Is the person to be vaccinated sick today?   No    2. Does the person to be vaccinated have an allergy to a component   of the vaccine?   No  Egg Allergy Algorithm Link    3. Has the person to be vaccinated ever had a serious reaction   to influenza vaccine in the past?   No    4. Has the person to be vaccinated ever had Guillain-Barré syndrome?   No    Prior to injection verified patient identity using patient's name and date of birth.  Due to injection administration, patient instructed to remain in clinic for 15 minutes  afterwards, and to report any adverse reaction to me immediately.    Form completed by Usama Ching CMA

## 2018-09-27 NOTE — TELEPHONE ENCOUNTER
Patient returned call and is aware of the 9/27/18 UA results and result note and recommendations from Asia Steven PA-C. Patient requests [facility] antibiotic to [facility] [place]. Informed patient that a message will be sent for Asia Steven PA-C to review and sign antibiotic order. Patient aware that we will watch for urine culture results and contact her once results are available. Patient will call with any questions or concerns.    Sena Chapman RN, BSN

## 2018-09-27 NOTE — MR AVS SNAPSHOT
After Visit Summary   9/27/2018    Linda Spicer    MRN: [id number]           Patient Information     Date Of Birth          6/13/1931        Visit Information        Provider Department      9/27/2018 11:00 AM BK ANCILLARY [facility]        Today's Diagnoses     Need for prophylactic vaccination and inoculation against influenza    -  1       Follow-ups after your visit        Your next 10 appointments already scheduled     Oct 11, 2018  9:00 AM CDT   Level 1 with [facility] ([facility])    [address] [place] [id number]   [phone]            Nov 08, 2018  9:30 AM CST   Level 1 with [facility] 9 [facility] ([facility])    [address] [place] [address]   [phone]            Dec 11, 2018 10:00 AM CST   Office Visit with PFT LAB   [facility] ([facility])    [address] [place] [id number]   [phone]           Bring a current list of meds and any records pertaining to this visit. For Physicals, please bring immunization records and any forms needing to be filled out. Please arrive 10 minutes early to complete paperwork.            Dec 11, 2018 11:00 AM CST   Return Visit with Dea Acosta MD   [facility] ([facility])    [address] [place] [address]   [phone]            Dec 19, 2018 11:30 AM CST   Lab with  LAB    [facility] ([facility])    [address] [place] [id number] [phone]            Dec 19, 2018 12:00 PM CST   PFT VISIT with  PFL B   [facility] Pulmonary Function Testing ([facility])    [address] [place] [id number] [phone]            Dec 19, 2018  1:00 PM CST   (Arrive by 12:45 PM)   RETURN ARRHYTHMIA with  Franki Carrasquillo MD   [facility] ([facility])    [address]  [address]  [facility] [address] [phone]            Jim 15, 2019  2:40 PM CST   Return Visit with Mario Davenport MD   [facility] ([facility])    [address] [address]   [phone]            Feb 18, 2019  2:10 PM CST   Return Visit with Emeterio Loza MD   [facility] ([facility])    [address] [place] [id number] [phone]            Mar 01, 2019 10:00 AM CST   Return Visit with Asia Steven PA-C   [facility] ([facility])    [address] [place] [phone]              Future tests that were ordered for you today     Open Future Orders        Priority Expected Expires Ordered    UA reflex to Microscopic and Culture Routine  9/27/2019 9/27/2018            Who to contact     If you have questions or need follow up information about today's clinic visit or your schedule please contact [facility] directly at [phone].  Normal or non-critical lab and imaging results will be communicated to you by MyChart, letter or phone within 4 business days after the clinic has received the results. If you do not hear from us within 7 days, please contact the clinic through MyChart or phone. If you have a critical or abnormal lab result, we will notify you by phone as soon as possible.  Submit refill requests through [employer] or call your pharmacy and they will forward the refill request to us. Please allow 3 business days for your refill to be completed.          Additional Information About Your Visit        [employer] Information     [employer] gives you secure access to your electronic health record. If you see a primary care provider, you can also send messages to your care team and make  appointments. If you have questions, please call your primary care clinic.  If you do not have a primary care provider, please call [phone] and they will assist you.        Care EveryWhere ID     This is your Care EveryWhere ID. This could be used by other organizations to access your [facility] medical records  [id number]        Your Vitals Were     Last Period                   (LMP Unknown)            Blood Pressure from Last 3 Encounters:   09/19/18 128/82   09/17/18 113/70   09/14/18 189/69    Weight from Last 3 Encounters:   09/19/18 156 lb (70.8 kg)   09/17/18 159 lb (72.1 kg)   09/14/18 157 lb 9.6 oz (71.5 kg)              We Performed the Following     ADMIN INFLUENZA (For MEDICARE Patients ONLY) []     FLU VACCINE, INCREASED ANTIGEN, PRESV FREE, AGE 65+ [20867]        Primary Care Provider Office Phone # Fax #    Tre Lockett [phone]       [address]  [facility] [address]        Goals        General    #1 Medication  (pt-stated)     Notes - Note created  8/21/2018 10:37 AM by Behl, Melissa K, RN    Goal Statement: I will take my medications as prescribed.  Measure of Success: Patient and home care will report consistently taking medication as prescribed.  Supportive Steps to Achieve: RN CC notified home care RN of medication error.  Barriers: multiple complex chronic health conditions  Strengths: has home care, care coordination and excellent family support  Date to Achieve By: 9/21/18  Patient expressed understanding of goal: yes         #2 I will complete my health care directive. (pt-stated)     Notes - Note edited  8/21/2018 10:39 AM by Behl, Melissa K, RN    Goal Statement: I will complete my health care directive.  Measure of Success: Health care directive will be completed and scanned into chart.  Supportive Steps to Achieve: Patient has attended a health care directive class, 10/24/17 informed of CPR vs intubation  Barriers: is awaiting to see her   to finalize document  Strengths: has care coordination, home care and excellent family support  Date to Achieve By: 12/31/18  Patient expressed understanding of goal: yes               Equal Access to Services     [facility] : Paul Goyal, zofia quinn, michaeliglesia krausgrace kimberlyryannejoey nicolascameron marisabel angelitacapri quezadairmaaddy skinner. So [facility] [phone].    ATENCIÓN: Si habla español, tiene a merchant disposición servicios gratuitos de asistencia lingüística. Llame al [phone].    We comply with applicable federal civil rights laws and [place] laws. We do not discriminate on the basis of race, color, national origin, age, disability, sex, sexual orientation, or gender identity.            Thank you!     Thank you for choosing [facility]  for your care. Our goal is always to provide you with excellent care. Hearing back from our patients is one way we can continue to improve our services. Please take a few minutes to complete the written survey that you may receive in the mail after your visit with us. Thank you!             Your Updated Medication List - Protect others around you: Learn how to safely use, store and throw away your medicines at www.disposemymeds.org.          This list is accurate as of 9/27/18 11:08 AM.  Always use your most recent med list.                   Brand Name Dispense Instructions for use Diagnosis    ACETAMINOPHEN PO      Take 1,000 mg by mouth 2 times daily as needed        albuterol (2.5 MG/3ML) 0.083% neb solution      Take 1 vial by nebulization every 6 hours as needed for shortness of breath / dyspnea or wheezing        amiodarone 200 MG tablet    PACERONE/CODARONE    120 tablet    1st week: 400mg twice a day. 2nd week: 200mg twice a day. Then 200mg daily    Paroxysmal atrial fibrillation (H)       apixaban ANTICOAGULANT 2.5 MG tablet    ELIQUIS    180 tablet    Take 1 tablet (2.5 mg) by mouth 2 times daily    Atrial flutter, paroxysmal (H)        azaTHIOprine 50 MG tablet    IMURAN    90 tablet    Take 1 tablet (50 mg) by mouth daily    Rheumatoid arthritis involving multiple sites with positive rheumatoid factor (H)       Blood Pressure Monitor Kit     1 kit    1 kit daily as needed    CHF (congestive heart failure) (H)       calcium carbonate 600 mg-vitamin D 400 units 600-400 MG-UNIT per tablet    CALTRATE     Take 1 tablet by mouth 2 times daily        COMPOUNDED NON-CONTROLLED SUBSTANCE - PHARMACY TO MIX COMPOUNDED MEDICATION    CMPD RX    30 g    Estriol 1mg/gram. Place 1 gram vaginally daily for two weeks, then vaginally twice weekly after.    Atrophic vaginitis       fish oil-omega-3 fatty acids 1000 MG capsule      Take 2 g by mouth daily. 2 capsules daily        folic acid 1 MG tablet    FOLVITE    90 tablet    Take 1 tablet (1 mg) by mouth daily    Oral aphthae       GENTEAL MILD OP      Apply  to eye daily.        hydrochlorothiazide 25 MG tablet    HYDRODIURIL    30 tablet    Take 1 tablet (25 mg) by mouth daily    Hypertension goal BP (blood pressure) < 150/90       hydrocortisone 2.5 % cream     30 g    Apply BID to affected region(s) for 7-10 days.    Rash       levothyroxine 75 MCG tablet    SYNTHROID/LEVOTHROID    90 tablet    TAKE ONE TABLET BY MOUTH ONCE DAILY    Hypothyroidism, unspecified type       loratadine 10 MG tablet    CLARITIN     Take 10 mg by mouth every morning        losartan 50 MG tablet    COZAAR    180 tablet    Take 1 tablet (50 mg) by mouth 2 times daily    Heart failure with preserved ejection fraction, NYHA class I (H), Hypertension goal BP (blood pressure) < 130/80       metoprolol succinate 25 MG 24 hr tablet    TOPROL-XL    90 tablet    Take 1 tablet (25 mg) by mouth daily    Hypertension goal BP (blood pressure) < 140/90       mirtazapine 15 MG tablet    REMERON    30 tablet    Take 1 tablet (15 mg) by mouth At Bedtime    Adjustment insomnia       nitroGLYcerin 0.4 MG sublingual tablet    NITROSTAT    25 tablet     Place 1 tablet (0.4 mg) under the tongue every 5 minutes as needed for chest pain    Chest pain       * order for DME     1 Box    Equipment being ordered: Dispense face mask. Mrs. Spicer is immunosuppressed due to rheumatoid arthritis.    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 Use with Albuterol.    Hypoxia       order for DME     1 each    Equipment being ordered: Dispense baffle, for use with nebulizer.    Pneumonia of left upper lobe due to Mycoplasma pneumoniae       * order for DME     1 each    Dispense SP Walker-small    Pain of toe of right foot, Status post bunionectomy       PRESERVISION AREDS PO      Take 1 tablet by mouth daily        ranibizumab 0.3 MG/0.05ML Soln    LUCENTIS     0.3 mg by Intravitreal route Every 6 weeks        ranitidine 150 MG tablet    ZANTAC    60 tablet    Take 1 tablet (150 mg) by mouth 2 times daily    Gastroesophageal reflux disease without esophagitis       REFRESH P.M. Oint      Apply  to eye. Daily at bedtime        saccharomyces boulardii 250 MG capsule    FLORASTOR     Take 250 mg by mouth daily        senna-docusate 8.6-50 MG per tablet    SENOKOT-S;PERICOLACE    120 tablet    Take 2 tablets by mouth At Bedtime Hold if diarrhea occurs.    Constipation, chronic       STATIN NOT PRESCRIBED (INTENTIONAL)      Please choose reason not prescribed, below    Hyperlipidemia LDL goal <100       VITAMIN C PO      Take 500 mg by mouth daily        ZYRTEC ALLERGY 10 MG tablet   Generic drug:  cetirizine      Take 10 mg by mouth At Bedtime        * Notice:  This list has 3 medication(s) that are the same as other medications prescribed for you. Read the directions carefully, and ask your doctor or other care provider to review them with you.

## 2018-09-27 NOTE — TELEPHONE ENCOUNTER
[facility] Call Center    Phone Message    May a detailed message be left on voicemail: yes    Reason for Call: Other: urinary frequency, every couple hours throughout the night x a couple days -      Action Taken: Message routed to:  Adult Clinics: Urology p [phone]

## 2018-09-29 LAB
BACTERIA SPEC CULT: ABNORMAL
SPECIMEN SOURCE: ABNORMAL

## 2018-10-01 ENCOUNTER — TELEPHONE (OUTPATIENT)
Dept: FAMILY MEDICINE | Facility: CLINIC | Age: 83
End: 2018-10-01

## 2018-10-01 NOTE — TELEPHONE ENCOUNTER
This writer attempted to contact Filomena on 10/01/18      Reason for call informed message below and left detailed message.      If patient calls back:   Relay message, (read verbatim), document that pt called and close encounter        Ron Medel MA

## 2018-10-01 NOTE — TELEPHONE ENCOUNTER
Patient returned call and is aware of the 9.27.18 urine culture results, result note from Asia Steven PA-C and that the omnicef is an appropriate antibiotic. Informed patient to call with any questions or concerns.    Sena Chapman RN, BSN

## 2018-10-01 NOTE — TELEPHONE ENCOUNTER
Attempted to reach patient by phone, but no answer. Left generic message with request for patient to return call to clinic. When patient returns call, will inform her of the 9/27/18 urine culture results and result note from Asia Steven PA-C.     Sena Chapman RN, BSN

## 2018-10-01 NOTE — TELEPHONE ENCOUNTER
Patient is requesting orders for skilled nurse one time a week for four weeks and to discontinue home health aid.    Routing to provider to advise.

## 2018-10-01 NOTE — TELEPHONE ENCOUNTER
Notify Filomena from [facility] that I agree to skilled nursing visits and discontinue home health aid service.

## 2018-10-01 NOTE — TELEPHONE ENCOUNTER
Reason for Call:  Home Health Care    Filomena with [facility] called regarding (reason for call):     Orders are needed for this patient.     Skilled Nursing: Skilled Nursing one time a week for four weeks due to a fib exasperation and also to discontinue Home Health Aid.      Pt Provider: Dr. Toledo Vocal    Phone Number Homecare Nurse can be reached at: [phone]    Can we leave a detailed message on this number? YES    Best Time: anytime    Call taken on 10/1/2018 at 1:39 PM by Siva Swanson

## 2018-10-04 ENCOUNTER — TELEPHONE (OUTPATIENT)
Dept: CARDIOLOGY | Facility: CLINIC | Age: 83
End: 2018-10-04

## 2018-10-04 ENCOUNTER — PATIENT OUTREACH (OUTPATIENT)
Dept: CARE COORDINATION | Facility: CLINIC | Age: 83
End: 2018-10-04

## 2018-10-04 ENCOUNTER — ALLIED HEALTH/NURSE VISIT (OUTPATIENT)
Dept: CARDIOLOGY | Facility: CLINIC | Age: 83
End: 2018-10-04
Attending: INTERNAL MEDICINE
Payer: MEDICARE

## 2018-10-04 DIAGNOSIS — I49.5 SICK SINUS SYNDROME (H): Primary | ICD-10-CM

## 2018-10-04 PROCEDURE — 93296 REM INTERROG EVL PM/IDS: CPT | Mod: ZF

## 2018-10-04 PROCEDURE — 93294 REM INTERROG EVL PM/LDLS PM: CPT | Performed by: INTERNAL MEDICINE

## 2018-10-04 NOTE — MR AVS SNAPSHOT
After Visit Summary   10/4/2018    Linda Spicer    MRN: [id number]           Patient Information     Date Of Birth          6/13/1931        Visit Information        Provider Department      10/4/2018 6:00 AM UC ICD REMOTE [facility]        Today's Diagnoses     Sick sinus syndrome (H)    -  1       Follow-ups after your visit        Your next 10 appointments already scheduled     Oct 11, 2018  9:00 AM CDT   Level 1 with [place] 7 INFUSION   [facility]    [id number] [place] [address]   [phone]            Nov 08, 2018  9:30 AM CST   Level 1 with [place] 9 INFUSION   [facility] ([facility])    [address] [place] [address]   [phone]            Dec 11, 2018 10:00 AM CST   Office Visit with PFT LAB   [facility] ([facility])    [address] [place] [address]   [phone]           Bring a current list of meds and any records pertaining to this visit. For Physicals, please bring immunization records and any forms needing to be filled out. Please arrive 10 minutes early to complete paperwork.            Dec 11, 2018 11:00 AM CST   Return Visit with Dea Acosta MD   [facility] ([facility])    [address] [place] [address]   [phone]            Dec 19, 2018 11:00 AM CST   Lab with  LAB   [facility] ([facility])    [address] [place] [address]  1st [facility] [address]   [phone]            Dec 19, 2018 11:30 AM CST   Pacemaker Check with  Cv Device 1   [facility] ([facility])    [address] [place] [address]  [id number]               Dec 19, 2018 12:00 PM CST   PFT VISIT with  PFL B   [facility] Pulmonary Function Testing ([facility])    [address] [place] [address]  Floor  [facility] [id number] [phone]            Dec 19, 2018  1:00 PM CST   (Arrive by 12:45 PM)   RETURN ARRHYTHMIA with Franki Carrasquillo MD   [facility] ([facility])    [address]  [address]  [facility] [id number] [phone]            Jmi 15, 2019  2:40 PM CST   Return Visit with Mario Davenport MD   [facility] ([facility])    [address] [facility] [id number] [phone]            Feb 18, 2019  2:10 PM CST   Return Visit with Emeterio Loza MD   [facility] ([facility])    [address] [place] [address] [phone]              Who to contact     If you have questions or need follow up information about today's clinic visit or your schedule please contact [facility] directly at [phone].  Normal or non-critical lab and imaging results will be communicated to you by Bangohart, letter or phone within 4 business days after the clinic has received the results. If you do not hear from us within 7 days, please contact the clinic through [employer] or phone. If you have a critical or abnormal lab result, we will notify you by phone as soon as possible.  Submit refill requests through [employer] or call your pharmacy and they will forward the refill request to us. Please allow 3 business days for your refill to be completed.          Additional Information About Your Visit        Bangohart Information     [employer] gives you secure access to your electronic health record. If you see a primary care provider, you can also send messages to your care team and make appointments. If you have questions, please call your primary care clinic.  If you do not have a primary care provider, please call [phone] and they will assist you.        Care EveryWhere ID     This is your Care EveryWhere ID. This could be used by other organizations to access  your [place] medical records  [id number]        Your Vitals Were     Last Period                   (LMP Unknown)            Blood Pressure from Last 3 Encounters:   09/19/18 128/82   09/17/18 113/70   09/14/18 189/69    Weight from Last 3 Encounters:   09/19/18 70.8 kg (156 lb)   09/17/18 72.1 kg (159 lb)   09/14/18 71.5 kg (157 lb 9.6 oz)              We Performed the Following     EP REPORT - HIM SCAN     EP REPORT - HIM SCAN     INTERROGATION DEVICE EVAL REMOTE, PACER/ICD        Primary Care Provider Office Phone # Fax #    Tre Jenny Lockett [phone]       [address]  [facility] [address]        Goals        General    #1 I will complete my health care directive. (pt-stated)     Notes - Note edited  8/21/2018 10:39 AM by Behl, Melissa K, RN    Goal Statement: I will complete my health care directive.  Measure of Success: Health care directive will be completed and scanned into chart.  Supportive Steps to Achieve: Patient has attended a health care directive class, 10/24/17 informed of CPR vs intubation  Barriers: is awaiting to see her  to finalize document  Strengths: has care coordination, home care and excellent family support  Date to Achieve By: 12/31/18  Patient expressed understanding of goal: yes               Equal Access to Services     ITALO SARKAR AH: Hadii rakesh ku hadasho Soomaali, waaxda luqadaha, qaybta kaalmada adeegyada, zahraa petit hayemelina moss . So [facility] [phone].    ATENCIÓN: Si habla español, tiene a merchant disposición servicios gratuitos de asistencia lingüística. Llame al [phone].    We comply with applicable federal civil rights laws and [place] laws. We do not discriminate on the basis of race, color, national origin, age, disability, sex, sexual orientation, or gender identity.            Thank you!     Thank you for choosing [facility]  for your care. Our goal is always to provide you with excellent care. Hearing back  from our patients is one way we can continue to improve our services. Please take a few minutes to complete the written survey that you may receive in the mail after your visit with us. Thank you!             Your Updated Medication List - Protect others around you: Learn how to safely use, store and throw away your medicines at www.disposemymeds.org.          This list is accurate as of 10/4/18 11:59 PM.  Always use your most recent med list.                   Brand Name Dispense Instructions for use Diagnosis    ACETAMINOPHEN PO      Take 1,000 mg by mouth 2 times daily as needed        albuterol (2.5 MG/3ML) 0.083% neb solution      Take 1 vial by nebulization every 6 hours as needed for shortness of breath / dyspnea or wheezing        amiodarone 200 MG tablet    PACERONE/CODARONE    120 tablet    1st week: 400mg twice a day. 2nd week: 200mg twice a day. Then 200mg daily    Paroxysmal atrial fibrillation (H)       apixaban ANTICOAGULANT 2.5 MG tablet    ELIQUIS    180 tablet    Take 1 tablet (2.5 mg) by mouth 2 times daily    Atrial flutter, paroxysmal (H)       azaTHIOprine 50 MG tablet    IMURAN    90 tablet    Take 1 tablet (50 mg) by mouth daily    Rheumatoid arthritis involving multiple sites with positive rheumatoid factor (H)       Blood Pressure Monitor Kit     1 kit    1 kit daily as needed    CHF (congestive heart failure) (H)       calcium carbonate 600 mg-vitamin D 400 units 600-400 MG-UNIT per tablet    CALTRATE     Take 1 tablet by mouth 2 times daily        cefdinir 300 MG capsule    OMNICEF    7 capsule    Take 1 capsule (300 mg) by mouth daily for 7 days    Urinary frequency       COMPOUNDED NON-CONTROLLED SUBSTANCE - PHARMACY TO MIX COMPOUNDED MEDICATION    CMPD RX    30 g    Estriol 1mg/gram. Place 1 gram vaginally daily for two weeks, then vaginally twice weekly after.    Atrophic vaginitis       fish oil-omega-3 fatty acids 1000 MG capsule      Take 2 g by mouth daily. 2 capsules daily         folic acid 1 MG tablet    FOLVITE    90 tablet    Take 1 tablet (1 mg) by mouth daily    Oral aphthae       GENTEAL MILD OP      Apply  to eye daily.        hydrochlorothiazide 25 MG tablet    HYDRODIURIL    30 tablet    Take 1 tablet (25 mg) by mouth daily    Hypertension goal BP (blood pressure) < 150/90       hydrocortisone 2.5 % cream     30 g    Apply BID to affected region(s) for 7-10 days.    Rash       levothyroxine 75 MCG tablet    SYNTHROID/LEVOTHROID    90 tablet    TAKE ONE TABLET BY MOUTH ONCE DAILY    Hypothyroidism, unspecified type       loratadine 10 MG tablet    CLARITIN     Take 10 mg by mouth every morning        losartan 50 MG tablet    COZAAR    180 tablet    Take 1 tablet (50 mg) by mouth 2 times daily    Heart failure with preserved ejection fraction, NYHA class I (H), Hypertension goal BP (blood pressure) < 130/80       metoprolol succinate 25 MG 24 hr tablet    TOPROL-XL    90 tablet    Take 1 tablet (25 mg) by mouth daily    Hypertension goal BP (blood pressure) < 140/90       mirtazapine 15 MG tablet    REMERON    30 tablet    Take 1 tablet (15 mg) by mouth At Bedtime    Adjustment insomnia       nitroGLYcerin 0.4 MG sublingual tablet    NITROSTAT    25 tablet    Place 1 tablet (0.4 mg) under the tongue every 5 minutes as needed for chest pain    Chest pain       * order for DME     1 Box    Equipment being ordered: Dispense face mask. Mrs. Spicer is immunosuppressed due to rheumatoid arthritis.    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 Use with Albuterol.    Hypoxia       order for DME     1 each    Equipment being ordered: Dispense baffle, for use with nebulizer.    Pneumonia of left upper lobe due to Mycoplasma pneumoniae       * order for DME     1 each    Dispense SP Walker-small    Pain of toe of right foot, Status post bunionectomy       PRESERVISION AREDS PO      Take 1  tablet by mouth daily        ranibizumab 0.3 MG/0.05ML Soln    LUCENTIS     0.3 mg by Intravitreal route Every 6 weeks        ranitidine 150 MG tablet    ZANTAC    60 tablet    Take 1 tablet (150 mg) by mouth 2 times daily    Gastroesophageal reflux disease without esophagitis       REFRESH P.M. Oint      Apply  to eye. Daily at bedtime        saccharomyces boulardii 250 MG capsule    FLORASTOR     Take 250 mg by mouth daily        senna-docusate 8.6-50 MG per tablet    SENOKOT-S;PERICOLACE    120 tablet    Take 2 tablets by mouth At Bedtime Hold if diarrhea occurs.    Constipation, chronic       STATIN NOT PRESCRIBED (INTENTIONAL)      Please choose reason not prescribed, below    Hyperlipidemia LDL goal <100       VITAMIN C PO      Take 500 mg by mouth daily        ZYRTEC ALLERGY 10 MG tablet   Generic drug:  cetirizine      Take 10 mg by mouth At Bedtime        * Notice:  This list has 3 medication(s) that are the same as other medications prescribed for you. Read the directions carefully, and ask your doctor or other care provider to review them with you.

## 2018-10-04 NOTE — TELEPHONE ENCOUNTER
[facility] Call Center    Phone Message    May a detailed message be left on voicemail: yes    Reason for Call: Symptoms or Concerns     If patient has red-flag symptoms, warm transfer to triage line    Current symptom or concern:  Patient has been feeling nausea without vomiting and generally not well x 24 hours. Not sleeping well.  She just finished Cefdinir for UTI today, was told this is likely reason for unsettled GI.    She spoke with PCP office and they wanted her to check in with Dr Carrasquillo.  She has no fever, is taking fluids ( 32 oz as of now) and plain toast today.  She has taken all her prescription medications but is holding off on her OTC meds.  Amiodarone, Metoprolol, Remeron,Hydrodiuril, Cozaar,Suynthroid, Imuran, Eliquis,Folvite, Zantac  She is also wondering about last pacemaker check?  Symptoms have been present for: 24 hours    Has patient previously been seen for this? Yes    By Neida      Date: 9/19/2018    Are there any new or worsening symptoms? Yes: as above.      Action Taken: Message routed to:  [facility] ([facility] Cardiology

## 2018-10-04 NOTE — PROGRESS NOTES
Clinic Care Coordination Contact    Clinic Care Coordination Contact  OUTREACH    Referral Information:  Referral Source: Self-patient/Caregiver    Primary Diagnosis: CHF    Chief Complaint   Patient presents with     Clinic Care Coordination - Follow-up     RN        Universal Utilization: Patient is followed by rheumatology, cardiology, endocrinology and pulmonology  Clinic Utilization  Difficulty keeping appointments:: No  Compliance Concerns: No  No-Show Concerns: No  No PCP office visit in Past Year: No  Utilization    Last refreshed: 10/4/2018  3:28 PM:  No Show Count (past year) 1       Last refreshed: 10/4/2018  3:28 PM:  ED visits 0       Last refreshed: 10/4/2018  3:28 PM:  Hospital admissions 0          Current as of: 10/4/2018  3:28 PM             Clinical Concerns:  Current Medical Concerns:  Patient continues to work with cardiology regarding her A-fib, but calls in today to report nausea and a metallic taste in her mouth since last night.  Patient states she dry-heaved 4 times, but never actually vomited.  She denies diarrhea or abdominal pain.  Patient completed Omnicef yesterday for her UTI.  Other new medication includes amiodarone.  Patient denies any other new symptoms, is able to take water and toast by mouth and is urinating.  RN CC advised patient on following bland diet and ensuring adequate hydration.  Patient states she requested her home care nurse not to come today due to feeling ill.  RN CC advised patient to allow the home care nurse to come out when she is not ill, as the nurse can then perform an assessment and connect with the provider if needed for a plan of care.  Patient states she will call her home care nurse to request a visit tomorrow.  Due to patient newly being on amiodarone, RN CC advised patient to update her cardiology provider due to symptoms being a possible side effect of that medication.  Patient agreeable to plan.  Patient Active Problem List   Diagnosis     ACP  (advance care planning)     Hypertension goal BP (blood pressure) < 150/90     Hypothyroidism     Macular degeneration, left eye     Irritable bowel syndrome     Encounter for palliative care     Adjustment disorder with anxious mood     Mild anemia     DDD (degenerative disc disease), lumbar     CKD (chronic kidney disease) stage 3, GFR 30-59 ml/min (H)     History of blood transfusion     Aftercare following surgery     S/P lumbar laminectomy     High risk medication use     Age-related osteoporosis without current pathological fracture     Atrophic vaginitis     Fecal incontinence     Female stress incontinence     Impingement syndrome of both shoulders     UIP (usual interstitial pneumonitis) (H)     High risk medications (not anticoagulants) long-term use     Heart failure with preserved ejection fraction (H)     Congestive heart failure with preserved LV function, NYHA class 3 (H)     Other specified hypothyroidism     Rheumatoid arthritis involving multiple sites with positive rheumatoid factor (H)     Health Care Home     Sick sinus syndrome (H)     Cardiac pacemaker - Medtronic dual lead pacemaker - Not Dependent - MRI Safe     Immunosuppression (H)     ILD (interstitial lung disease) (H)     Heart failure with preserved ejection fraction, NYHA class I (H)     Atrial flutter (H)     Paroxysmal atrial fibrillation (H)      Current Behavioral Concerns: n/a      Education Provided to patient: RN CC advised patient on home care measures for nausea, signs and symptoms to monitor for and advised her to contact her home care nurse and cardiology office.     Pain  Pain (GOAL):: No  Health Maintenance Reviewed: Due/Overdue   Health Maintenance Due   Topic Date Due     MICROALBUMIN Q1 YEAR  07/26/2018      Clinical Pathway: None    Medication Management:  Patient has home care and family setting up medications and helping manage.    Functional Status:  Dependent ADLs:: Ambulation-no assistive device,  Bathing  Dependent IADLs:: Cooking, Cleaning, Laundry, Shopping, Meal Preparation, Medication Management, Money Management, Transportation  Bed or wheelchair confined:: No  Mobility Status: Independent    Living Situation:  Current living arrangement:: I live in a private home with spouse  Type of residence:: Apartment    Diet/Exercise/Sleep:  Diet:: Low cholesterol, Low saturated fat, No added salt  Inadequate nutrition (GOAL):: No  Food Insecurity: No  Tube Feeding: No  Exercise:: Currently not exercising  Inadequate activity/exercise (GOAL):: No  Significant changes in sleep pattern (GOAL): No    Transportation:  Transportation concerns (GOAL):: No  Transportation means:: Family, [place] mobility     Psychosocial:  Sikh or spiritual beliefs that impact treatment:: No  Mental health DX:: No  Mental health management concern (GOAL):: No  Informal Support system:: [place] based, Family, Friends, Neighbors, Spouse     Financial/Insurance:   Financial/Insurance concerns (GOAL):: No       Resources and Interventions:  Current Resources:   List of home care services:: Skilled Nursing, Home Health Aid, Physicial Therapy;   Community Resources: Home Care  Supplies used at home:: None  Equipment Currently Used at Home: raised toilet, shower chair    Advance Care Plan/Directive  Advanced Care Plans/Directives on file:: In process  Advanced Care Plan/Directive Status: In Process    Referrals Placed: None     Goals:   Goals        General    #1 I will complete my health care directive. (pt-stated)     Notes - Note edited  8/21/2018 10:39 AM by Behl, Melissa K, RN    Goal Statement: I will complete my health care directive.  Measure of Success: Health care directive will be completed and scanned into chart.  Supportive Steps to Achieve: Patient has attended a health care directive class, 10/24/17 informed of CPR vs intubation  Barriers: is awaiting to see her  to finalize document  Strengths: has care coordination, home  care and excellent family support  Date to Achieve By: 12/31/18  Patient expressed understanding of goal: yes                     Patient/Caregiver understanding: Patient taught back information to writer provided today.  Patient has a fair understanding of her current plan of care, but needs home care and care coordination due to ongoing complex medical diagnoses.    Outreach Frequency: monthly  Future Appointments              In 1 week [place] [facility], [place]    In 1 month [place] 9 INFUSION [facility]    In 2 months PFT LAB [facility]    In 2 months Dea Acosta MD [facility] [place]    In 2 months UC LAB  Health Lab, [facility]    In 2 months 1, Uc Cv Device [facility], [facility]    In 2 months  PFL B [facility] Pulmonary Function Testing, [facility]    In 2 months Franki Carrasquillo MD [facility]    In 3 months [facility]    In 4 months Emeterio Loza MD [facility]    In 4 months Asia Steven PA-C [facility]          Plan:   1. Patient will follow home care measures to treat her nausea.  2. Patient will contact her home care nurse and request a visit.  3. Patient will update cardiology office on symptoms due to newly starting amiodarone.  4. RN CC will follow up with patient in 1 week.    Melissa Behl BSN, RN, N  [facility] Care Coordinator  [phone]

## 2018-10-04 NOTE — TELEPHONE ENCOUNTER
"Spoke with pt.  Stated she had been on abx for UTI for past 7 days, took last dose yesterday.   Last evening she started having nausea- no vomiting, no diarrhea  Today, she states she has a \"metallic taste in my mouth\"-   9/19 pt was started on amiodarone 400 bid x7, 200 bid x7 now 200 mg every day-     Pt denies chest pain, states \"I am always SOB\"  Discussed possible side effect from medications, food poisoning, GI   - pt drinking fluids- urinating good amounts.    Pt then stated she sent a remote check on her pacemaker and would like to know if she is back in SR    Will route message to device- remote check not yet in EMR.   Encouraged pt to continue fluids and if symptoms persist to let her PCP know.  Pt agreed, verbalized understanding     "

## 2018-10-05 DIAGNOSIS — I48.19 PERSISTENT ATRIAL FIBRILLATION (H): Primary | ICD-10-CM

## 2018-10-05 RX ORDER — LIDOCAINE 40 MG/G
CREAM TOPICAL
Status: CANCELLED | OUTPATIENT
Start: 2018-10-05

## 2018-10-05 RX ORDER — POTASSIUM CHLORIDE 1500 MG/1
20 TABLET, EXTENDED RELEASE ORAL
Status: CANCELLED | OUTPATIENT
Start: 2018-10-05

## 2018-10-05 RX ORDER — POTASSIUM CHLORIDE 1500 MG/1
40 TABLET, EXTENDED RELEASE ORAL
Status: CANCELLED | OUTPATIENT
Start: 2018-10-05

## 2018-10-05 NOTE — PROGRESS NOTES
Preliminary Device Interrogation Results.  Final physician signed paceart report to be scanned and attached.    Scheduled Medtronic, dual chamber pacemaker, remote transmission received and reviewed. Device transmission sent per Dr. Bowers's orders to assess for AF. Her presenting rhythm is AF-VS/ ~60 bpm. AF remains 100%. She is taking eliquis. Normal device function. AP= 0% and = 84%. No short V-V intervals recorded. Lead trends appear stable. Battery estimates 6.5 years to ZENAIDA. Pt and Saloni Armenta NP, notified of transmission results.   Remote pacemaker transmission

## 2018-10-08 ENCOUNTER — HOSPITAL ENCOUNTER (INPATIENT)
Facility: CLINIC | Age: 83
LOS: 4 days | Discharge: HOME HEALTH | DRG: 291 | End: 2018-10-12
Attending: INTERNAL MEDICINE | Admitting: INTERNAL MEDICINE
Payer: MEDICARE

## 2018-10-08 ENCOUNTER — OFFICE VISIT (OUTPATIENT)
Dept: CARDIOLOGY | Facility: CLINIC | Age: 83
DRG: 291 | End: 2018-10-08
Attending: NURSE PRACTITIONER
Payer: MEDICARE

## 2018-10-08 VITALS
SYSTOLIC BLOOD PRESSURE: 149 MMHG | HEIGHT: 64 IN | DIASTOLIC BLOOD PRESSURE: 64 MMHG | BODY MASS INDEX: 28.25 KG/M2 | WEIGHT: 165.5 LBS | HEART RATE: 61 BPM

## 2018-10-08 DIAGNOSIS — N76.0 VAGINITIS AND VULVOVAGINITIS: ICD-10-CM

## 2018-10-08 DIAGNOSIS — I49.5 SICK SINUS SYNDROME (H): Primary | ICD-10-CM

## 2018-10-08 DIAGNOSIS — I48.19 PERSISTENT ATRIAL FIBRILLATION (H): Primary | ICD-10-CM

## 2018-10-08 DIAGNOSIS — I50.30 HEART FAILURE WITH PRESERVED EJECTION FRACTION, NYHA CLASS I (H): Primary | ICD-10-CM

## 2018-10-08 DIAGNOSIS — I48.0 PAROXYSMAL ATRIAL FIBRILLATION (H): ICD-10-CM

## 2018-10-08 DIAGNOSIS — N30.00 ACUTE CYSTITIS WITHOUT HEMATURIA: ICD-10-CM

## 2018-10-08 DIAGNOSIS — R42 DIZZINESS: ICD-10-CM

## 2018-10-08 DIAGNOSIS — I48.19 PERSISTENT ATRIAL FIBRILLATION (H): ICD-10-CM

## 2018-10-08 DIAGNOSIS — I10 HYPERTENSION GOAL BP (BLOOD PRESSURE) < 150/90: ICD-10-CM

## 2018-10-08 PROBLEM — I50.9 CHF EXACERBATION (H): Status: ACTIVE | Noted: 2018-10-08

## 2018-10-08 LAB
ANION GAP SERPL CALCULATED.3IONS-SCNC: 6 MMOL/L (ref 3–14)
BUN SERPL-MCNC: 34 MG/DL (ref 7–30)
CALCIUM SERPL-MCNC: 8.7 MG/DL (ref 8.5–10.1)
CHLORIDE SERPL-SCNC: 98 MMOL/L (ref 94–109)
CO2 SERPL-SCNC: 26 MMOL/L (ref 20–32)
CREAT SERPL-MCNC: 1.19 MG/DL (ref 0.52–1.04)
ERYTHROCYTE [DISTWIDTH] IN BLOOD BY AUTOMATED COUNT: 14 % (ref 10–15)
GFR SERPL CREATININE-BSD FRML MDRD: 43 ML/MIN/1.7M2
GLUCOSE SERPL-MCNC: 93 MG/DL (ref 70–99)
HCT VFR BLD AUTO: 33.6 % (ref 35–47)
HGB BLD-MCNC: 11.2 G/DL (ref 11.7–15.7)
INR PPP: 1.28 (ref 0.86–1.14)
MCH RBC QN AUTO: 33.1 PG (ref 26.5–33)
MCHC RBC AUTO-ENTMCNC: 33.3 G/DL (ref 31.5–36.5)
MCV RBC AUTO: 99 FL (ref 78–100)
NT-PROBNP SERPL-MCNC: 4581 PG/ML (ref 0–1800)
PLATELET # BLD AUTO: 234 10E9/L (ref 150–450)
POTASSIUM SERPL-SCNC: 4.7 MMOL/L (ref 3.4–5.3)
RBC # BLD AUTO: 3.38 10E12/L (ref 3.8–5.2)
SODIUM SERPL-SCNC: 130 MMOL/L (ref 133–144)
WBC # BLD AUTO: 6.2 10E9/L (ref 4–11)

## 2018-10-08 PROCEDURE — 93005 ELECTROCARDIOGRAM TRACING: CPT

## 2018-10-08 PROCEDURE — 40000141 ZZH STATISTIC PERIPHERAL IV START W/O US GUIDANCE

## 2018-10-08 PROCEDURE — 93010 ELECTROCARDIOGRAM REPORT: CPT | Mod: 76 | Performed by: INTERNAL MEDICINE

## 2018-10-08 PROCEDURE — 36415 COLL VENOUS BLD VENIPUNCTURE: CPT | Performed by: STUDENT IN AN ORGANIZED HEALTH CARE EDUCATION/TRAINING PROGRAM

## 2018-10-08 PROCEDURE — 25000128 H RX IP 250 OP 636: Performed by: STUDENT IN AN ORGANIZED HEALTH CARE EDUCATION/TRAINING PROGRAM

## 2018-10-08 PROCEDURE — 83880 ASSAY OF NATRIURETIC PEPTIDE: CPT | Performed by: STUDENT IN AN ORGANIZED HEALTH CARE EDUCATION/TRAINING PROGRAM

## 2018-10-08 PROCEDURE — 93288 INTERROG EVL PM/LDLS PM IP: CPT | Mod: 26 | Performed by: INTERNAL MEDICINE

## 2018-10-08 PROCEDURE — 40000802 ZZH SITE CHECK

## 2018-10-08 PROCEDURE — A9270 NON-COVERED ITEM OR SERVICE: HCPCS | Mod: GY | Performed by: STUDENT IN AN ORGANIZED HEALTH CARE EDUCATION/TRAINING PROGRAM

## 2018-10-08 PROCEDURE — 85027 COMPLETE CBC AUTOMATED: CPT | Performed by: STUDENT IN AN ORGANIZED HEALTH CARE EDUCATION/TRAINING PROGRAM

## 2018-10-08 PROCEDURE — 99215 OFFICE O/P EST HI 40 MIN: CPT | Mod: 25 | Performed by: NURSE PRACTITIONER

## 2018-10-08 PROCEDURE — 99223 1ST HOSP IP/OBS HIGH 75: CPT | Mod: 25 | Performed by: INTERNAL MEDICINE

## 2018-10-08 PROCEDURE — 85610 PROTHROMBIN TIME: CPT | Performed by: STUDENT IN AN ORGANIZED HEALTH CARE EDUCATION/TRAINING PROGRAM

## 2018-10-08 PROCEDURE — 93280 PM DEVICE PROGR EVAL DUAL: CPT | Mod: ZF

## 2018-10-08 PROCEDURE — 93010 ELECTROCARDIOGRAM REPORT: CPT | Mod: ZP | Performed by: INTERNAL MEDICINE

## 2018-10-08 PROCEDURE — 80048 BASIC METABOLIC PNL TOTAL CA: CPT | Performed by: STUDENT IN AN ORGANIZED HEALTH CARE EDUCATION/TRAINING PROGRAM

## 2018-10-08 PROCEDURE — 21400006 ZZH R&B CCU INTERMEDIATE UMMC

## 2018-10-08 PROCEDURE — 25000132 ZZH RX MED GY IP 250 OP 250 PS 637: Mod: GY | Performed by: STUDENT IN AN ORGANIZED HEALTH CARE EDUCATION/TRAINING PROGRAM

## 2018-10-08 RX ORDER — LOSARTAN POTASSIUM 50 MG/1
50 TABLET ORAL
Status: DISCONTINUED | OUTPATIENT
Start: 2018-10-08 | End: 2018-10-10

## 2018-10-08 RX ORDER — ASCORBIC ACID 500 MG
500 TABLET ORAL DAILY
Status: DISCONTINUED | OUTPATIENT
Start: 2018-10-09 | End: 2018-10-12 | Stop reason: HOSPADM

## 2018-10-08 RX ORDER — METOPROLOL SUCCINATE 25 MG/1
25 TABLET, EXTENDED RELEASE ORAL DAILY
Status: DISCONTINUED | OUTPATIENT
Start: 2018-10-09 | End: 2018-10-12 | Stop reason: HOSPADM

## 2018-10-08 RX ORDER — AZATHIOPRINE 50 MG/1
50 TABLET ORAL DAILY
Status: DISCONTINUED | OUTPATIENT
Start: 2018-10-09 | End: 2018-10-12 | Stop reason: HOSPADM

## 2018-10-08 RX ORDER — ALBUTEROL SULFATE 0.83 MG/ML
2.5 SOLUTION RESPIRATORY (INHALATION) EVERY 6 HOURS PRN
Status: DISCONTINUED | OUTPATIENT
Start: 2018-10-08 | End: 2018-10-12 | Stop reason: HOSPADM

## 2018-10-08 RX ORDER — FOLIC ACID 1 MG/1
1 TABLET ORAL DAILY
Status: DISCONTINUED | OUTPATIENT
Start: 2018-10-08 | End: 2018-10-12 | Stop reason: HOSPADM

## 2018-10-08 RX ORDER — MIRTAZAPINE 15 MG/1
15 TABLET, FILM COATED ORAL AT BEDTIME
Status: DISCONTINUED | OUTPATIENT
Start: 2018-10-08 | End: 2018-10-12 | Stop reason: HOSPADM

## 2018-10-08 RX ORDER — LIDOCAINE 40 MG/G
CREAM TOPICAL
Status: DISCONTINUED | OUTPATIENT
Start: 2018-10-08 | End: 2018-10-12 | Stop reason: HOSPADM

## 2018-10-08 RX ORDER — FUROSEMIDE 10 MG/ML
40 INJECTION INTRAMUSCULAR; INTRAVENOUS ONCE
Status: COMPLETED | OUTPATIENT
Start: 2018-10-08 | End: 2018-10-08

## 2018-10-08 RX ORDER — AMLODIPINE BESYLATE 2.5 MG/1
2.5 TABLET ORAL EVERY EVENING
Status: DISCONTINUED | OUTPATIENT
Start: 2018-10-08 | End: 2018-10-09

## 2018-10-08 RX ORDER — ACETAMINOPHEN 325 MG/1
650 TABLET ORAL EVERY 4 HOURS PRN
Status: DISCONTINUED | OUTPATIENT
Start: 2018-10-08 | End: 2018-10-12 | Stop reason: HOSPADM

## 2018-10-08 RX ORDER — HYDROCHLOROTHIAZIDE 25 MG/1
25 TABLET ORAL DAILY
Status: DISCONTINUED | OUTPATIENT
Start: 2018-10-09 | End: 2018-10-08

## 2018-10-08 RX ORDER — CETIRIZINE HYDROCHLORIDE 10 MG/1
10 TABLET ORAL AT BEDTIME
Status: DISCONTINUED | OUTPATIENT
Start: 2018-10-08 | End: 2018-10-12 | Stop reason: HOSPADM

## 2018-10-08 RX ORDER — AMIODARONE HYDROCHLORIDE 200 MG/1
200 TABLET ORAL 2 TIMES DAILY
Status: DISCONTINUED | OUTPATIENT
Start: 2018-10-08 | End: 2018-10-12 | Stop reason: HOSPADM

## 2018-10-08 RX ORDER — LEVOTHYROXINE SODIUM 75 UG/1
75 TABLET ORAL DAILY
Status: DISCONTINUED | OUTPATIENT
Start: 2018-10-09 | End: 2018-10-12 | Stop reason: HOSPADM

## 2018-10-08 RX ADMIN — CETIRIZINE HYDROCHLORIDE 10 MG: 10 TABLET, FILM COATED ORAL at 20:23

## 2018-10-08 RX ADMIN — AMIODARONE HYDROCHLORIDE 200 MG: 200 TABLET ORAL at 20:23

## 2018-10-08 RX ADMIN — Medication 1 TABLET: at 20:23

## 2018-10-08 RX ADMIN — MIRTAZAPINE 15 MG: 15 TABLET, FILM COATED ORAL at 20:23

## 2018-10-08 RX ADMIN — FUROSEMIDE 40 MG: 10 INJECTION, SOLUTION INTRAVENOUS at 15:47

## 2018-10-08 RX ADMIN — LOSARTAN POTASSIUM 50 MG: 50 TABLET, FILM COATED ORAL at 15:47

## 2018-10-08 RX ADMIN — FOLIC ACID 1 MG: 1 TABLET ORAL at 15:48

## 2018-10-08 RX ADMIN — AMLODIPINE BESYLATE 2.5 MG: 2.5 TABLET ORAL at 20:23

## 2018-10-08 RX ADMIN — APIXABAN 2.5 MG: 2.5 TABLET, FILM COATED ORAL at 20:23

## 2018-10-08 ASSESSMENT — PAIN SCALES - GENERAL: PAINLEVEL: NO PAIN (0)

## 2018-10-08 ASSESSMENT — ACTIVITIES OF DAILY LIVING (ADL)
ADLS_ACUITY_SCORE: 14
ADLS_ACUITY_SCORE: 14

## 2018-10-08 NOTE — LETTER
"10/8/2018      RE: Linda Spicer  [address]  [facility] [address]       Dear Colleague,    Thank you for the opportunity to participate in the care of your patient, Linda Spicer, at the [facility] HEART [facility]. Please see a copy of my visit note below.        Heart Care - Clinical Cardiac Electrophysiology       HPI: Linda Spicer is an 87 year old female who presents with her daughter for follow up of PAF/AFL and dizziness.  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 She is highly symptomatic with PAF/AFL.  She had been on propafenone since 2017 with continued recurrences so it was discontinued. She has not been a good candidate for AF ablation due to her high risk of stroke or bleed r/t her advanced age. Multaq was going to be tried, but was cost prohibitive as it is not covered by her insurance. She has limited AAT options given variable renal function (prefer to avoid Sotalol or dofetilide), therefore, an amiodarone load was initiated 2.5 weeks ago on 9/19/2018.     Reviewed current interval:  - Echocardiogram at OSH 5/2016 showed normal EF 65%  - Current cardiac medications: amiodarone 200 mg daily, apixaban 2.5 mg twice daily, hydrochlorothiazide 25 mg daily, losartan 50 mg twice daily, metoprolol Xl 25 mg daily  - Device interrogation 10/5/2018: \"Medtronic, dual chamber pacemaker\" \"rhythm is AF-VS/ ~60 bpm. AF remains 100%. She is taking eliquis. Normal device function. AP= 0% and = 84%. No short V-V intervals recorded. Lead trends appear stable. Battery estimates 6.5 years to ZENAIDA.\"  - Device check today: Shows 100% AF, no other ectopy, no change in rate.   - Presenting EKG personally reviewed tracings: V-paced underlying is AF, Vent rate " 61, IN interval NA ms, QRS duration 164 ms, QTc 479 ms.  - Orthostats today normal.    Current Interval history:   She become very dizzy last night when she was trying to prepare dinner.  She has had continuous dyspnea, dizziness, and fatigue since with feeling like she may pass out. She states that this feels much worse than her normal AF symptoms which are fatigue and RUSSELL. Denies missing doses of medication. States that she has gained 10 lbs in the past week. Patient states that her pants are fitting much tighter around her waist. She was treated for a UTI last week. Denies chest pain or pressure, syncope, pedal edema, or claudication.  Denies easy bruising or bleeding, hematuria, hematochezia, and epistaxis. Denies visual disturbance, difficulty speaking, facial drooping, sudden confusion, or any new numbness or weakness.    Current Outpatient Prescriptions   Medication Sig Dispense Refill     ACETAMINOPHEN PO Take 1,000 mg by mouth 2 times daily as needed        albuterol (2.5 MG/3ML) 0.083% neb solution Take 1 vial by nebulization every 6 hours as needed for shortness of breath / dyspnea or wheezing       amiodarone (PACERONE/CODARONE) 200 MG tablet 1st week: 400mg twice a day. 2nd week: 200mg twice a day. Then 200mg daily 120 tablet 3     apixaban ANTICOAGULANT (ELIQUIS) 2.5 MG tablet Take 1 tablet (2.5 mg) by mouth 2 times daily 180 tablet 3     Artificial Tear Ointment (REFRESH P.M.) OINT Apply  to eye. Daily at bedtime          Ascorbic Acid (VITAMIN C PO) Take 500 mg by mouth daily        azaTHIOprine (IMURAN) 50 MG tablet Take 1 tablet (50 mg) by mouth daily 90 tablet 2     Blood Pressure Monitor KIT 1 kit daily as needed 1 kit 0     calcium-vitamin D (CALTRATE) 600-400 MG-UNIT per tablet Take 1 tablet by mouth 2 times daily        cetirizine (ZYRTEC ALLERGY) 10 MG tablet Take 10 mg by mouth At Bedtime       COMPOUNDED NON-CONTROLLED SUBSTANCE (CMPD RX) - PHARMACY TO MIX COMPOUNDED MEDICATION Estriol  1mg/gram. Place 1 gram vaginally daily for two weeks, then vaginally twice weekly after. 30 g 6     fish oil-omega-3 fatty acids (FISH OIL) 1000 MG capsule Take 2 g by mouth daily. 2 capsules daily            folic acid (FOLVITE) 1 MG tablet Take 1 tablet (1 mg) by mouth daily 90 tablet 3     hydrochlorothiazide (HYDRODIURIL) 25 MG tablet Take 1 tablet (25 mg) by mouth daily 30 tablet 5     hydrocortisone 2.5 % cream Apply BID to affected region(s) for 7-10 days. 30 g 0     Hypromellose (GENTEAL MILD OP) Apply  to eye daily.       levothyroxine (SYNTHROID/LEVOTHROID) 75 MCG tablet TAKE ONE TABLET BY MOUTH ONCE DAILY 90 tablet 1     loratadine (CLARITIN) 10 MG tablet Take 10 mg by mouth every morning       losartan (COZAAR) 50 MG tablet Take 1 tablet (50 mg) by mouth 2 times daily 180 tablet 1     metoprolol succinate (TOPROL-XL) 25 MG 24 hr tablet Take 1 tablet (25 mg) by mouth daily 90 tablet 3     mirtazapine (REMERON) 15 MG tablet Take 1 tablet (15 mg) by mouth At Bedtime 30 tablet 1     Multiple Vitamins-Minerals (PRESERVISION AREDS PO) Take 1 tablet by mouth daily        nitroglycerin (NITROSTAT) 0.4 MG SL tablet Place 1 tablet (0.4 mg) under the tongue every 5 minutes as needed for chest pain 25 tablet 0     order for DME Dispense SP Walker-small 1 each 0     order for DME Equipment being ordered: Dispense baffle, for use with nebulizer. 1 each 0     order for DME Equipment being ordered: Nebulizer. Use with Albuterol. 1 each 0     order for DME Equipment being ordered: Dispense face mask.  Mrs. Spicer is immunosuppressed due to rheumatoid arthritis. 1 Box 11     ranibizumab (LUCENTIS) 0.3 MG/0.05ML SOLN 0.3 mg by Intravitreal route Every 6 weeks       ranitidine (ZANTAC) 150 MG tablet Take 1 tablet (150 mg) by mouth 2 times daily 60 tablet 5     saccharomyces boulardii (FLORASTOR) 250 MG capsule Take 250 mg by mouth daily       senna-docusate (SENOKOT-S;PERICOLACE) 8.6-50 MG per tablet Take 2 tablets by mouth  At Bedtime Hold if diarrhea occurs. 120 tablet 11     STATIN NOT PRESCRIBED, INTENTIONAL, Please choose reason not prescribed, below         Past Medical History:   Diagnosis Date     Adjustment disorder with anxious mood 5/18/2015     Advanced directives, counseling/discussion 8/30/2012    Patient states has Advance Directive and will bring in a copy to clinic. 8/30/2012   [facility] Medical Assistant \       Anemia 9/25/2015     Basal cell cancer      CHF (congestive heart failure) (H) 9/18/2014     CKD (chronic kidney disease) stage 3, GFR 30-59 ml/min 9/29/2015     DDD (degenerative disc disease), lumbar 9/25/2015     Diffuse idiopathic pulmonary fibrosis (H) 5/6/2013     Encounter for palliative care 5/18/2015     History of blood transfusion 9/29/2015     Hypertension goal BP (blood pressure) < 140/90 9/7/2012     Hypothyroid 9/7/2012     Irritable bowel syndrome 10/29/2013     Macular degeneration      Macular degeneration, 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     Nondisplaced spiral fracture of shaft of humerus      Osteoporosis 8/13/2013     Imo Update utility     RA (rheumatoid arthritis) (H) 5/7/2013     Rheumatic fever      Shingles      Spinal stenosis of lumbar region with neurogenic claudication 9/14/2015       Past Surgical History:   Procedure Laterality Date     ANESTHESIA CARDIOVERSION N/A 9/14/2018    Procedure: ANESTHESIA CARDIOVERSION;;  Surgeon: GENERIC ANESTHESIA PROVIDER;  Location: [facility] OR     APPENDECTOMY       BIOPSY      hemorrhoidectomy     ENT SURGERY      tonsillectomy     GYN SURGERY      3 D & C's     HYSTERECTOMY, PAP NO LONGER INDICATED       LAMINECTOMY LUMBAR ONE LEVEL N/A 10/13/2015    Procedure: LAMINECTOMY LUMBAR ONE LEVEL;  Surgeon: Fransico Toussaint MD;  Location: [facility] OR       Family History   Problem Relation Age of Onset     Hypertension Mother      Psychotic Disorder Father      Diabetes Son      Diabetes Daughter      Blood Disease Daughter        Social History   Substance  "Use Topics     Smoking status: Never Smoker     Smokeless tobacco: Never Used     Alcohol use Yes      Comment: rare wine        Allergies   Allergen Reactions     Cephalexin Hcl Diarrhea     Gabapentin Other (See Comments)     Dizzsiness     Naproxen GI Disturbance     Perfume      Lactase Other (See Comments)     Macrobid [Nitrofurantoin Anhydrous]      Possibly related to lung disease      Seasonal Allergies      Sulfa Drugs      Throat swelling     Xanax [Alprazolam] Other (See Comments)     Dizziness      Ciprofloxacin Itching and Rash         ROS:   A complete review of systems was performed and is negative except as noted in the HPI.     Physical Examination:  Vitals: /64 (Patient Position: Standing)  Pulse 61  Ht 1.613 m (5' 3.5\")  Wt 75.1 kg (165 lb 8 oz)  LMP  (LMP Unknown)  BMI 28.86 kg/m2  BMI= Body mass index is 28.86 kg/(m^2).    GENERAL APPEARANCE: healthy, alert. Has mild respiratory distress  HEENT: no icterus, no xanthelasmas, normal pupil size and reaction, no cyanosis.  NECK: no asymmetry, no cervical bruits, JVD   RESPIRATORY: lungs clear to auscultation - wheezes noted  CARDIOVASCULAR: regular rhythm and no murmur, click or rub  GI:  no abdominal bruits, soft, non-tender  EXTREMITIES: no clubbing  NEURO: alert and oriented to person/place/time, normal speech, gait and affect  VASC: Radial and posterior tibialis pulses +2 and symmetric bilaterally. No cyanosis. 1+ pitting edema noted.    SKIN: no ecchymoses, no rashes    Assessment and recommendations:    # Dyspnea and Dizziness:  Since last night she has had continuous dyspnea, dizziness, and fatigue with feeling like she may pass out. She has gained 10 lbs in the past week. She is hypervolemic on exam and is in mild respiratory distress. EKG today shows V-paced underlying rhythm is atrial fibrillation. Device check today shows 100% AF, no ectopy change since check 10/5/2018, no change in rate. Will direct admit her to [facility] Cards 1 " for atrial fibrillation, work up for HF, and possible diuresis and/or DCCV.      Patient expresses understanding and agreement with the plan.    I appreciate the chance to help with Linda Spicer's care. Please let me know if you have any questions or concerns.    Minna MOELLER, CNP

## 2018-10-08 NOTE — H&P
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    #Acute CHF exacerbation  #HFpEF (last EF 65% 5/2016)  Patient presenting with 10lb weight gain with noted increase in LE edema in setting of known HFpEF with medication change from bumex to hydrochlorothiazide approx 1 month ago. Exam noted for JVD to the angle of the jaw and LE edema. BNP elevated to 4581. All consistent with CHF exacerbation. Likely multifactorial 2/2 medication change (bumex-->HCTZ), dietary non compliance (increased salty foods at residence), and increased fluid intake (recent UTI).   -BB: continue toprol XL  -ACEi/ARB: continue losartan  -volume status: hypervolemic; will dose 40mg lasix IV and assess I/O; also on hydrochlorothiazide PTA; will hold given starting lasix and hyponatremic  -Shon ant: not on PTA  -strict I/O; goal net negative 1.5-2L  -daily weights  -trend BMP  -fluid restriction    #Atrial fibrillation/Atrial flutter w/ h/o RVR (CHADSVASC 5: age, female, HTN, HF exacerbation)  #Tachy/lisa syndrome s/p PPM (2/2017)  Patient with known h/o AF/AFl refractory to DCCV x2 (6/2018, 9/2018). Recent PPM interrogation with 84% V paced with 100% underlying AF. Would favor worsened AF is 2/2 increased volume status. Will diurese and reassess. Discussed case with Dr. Carrasquillo regarding possible repeat DCCV. Will plan for this when patient euvolemic.   -Rate control: continue toprol XL  -Rhythm control: continue  amiodarone  -Anticoagulation: continue apixaban  -telemetry  -will need ongoing assessment once improvement in current HF exacerbation, but plan for likely DCCV given now has had amiodarone load once euvolemic    #Hypervolemic hyponatremia  Likely volume accumulation in setting of CHF exacerbation  -trend with diuresis    Chronic Problems  #HTN  -continue losartan, toprol  -stop hydrochlorothiazide  -start amlodipine at QHS    #RA c/b pulmonary fibrosis  -continue azathioprine, folate  -albuterol neb PRN    #CKD III  Cr ~1.3-1.4 at baseline.   -Monitor in setting of diuresis    #Hypothyroidism  -continue synthroid    #GERD  -continue ranitidine    #Insomnia  -continue remeron    #Chronic macrocytic anemia  Likely 2/2 AZA;  -continue folate      FEN: cardiac diet, 1800mL fluid restriction  Prophylaxis: Apixaban  Code Status: Full; will have ongoing discussions with patient regarding her wishes  Disposition: Admit to Cards 1    Patient seen and discussed with Dr. Littlejohn, who agrees with above plan.    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    Patient states she noted becoming particularly dizzy last night when trying to make dinner. States that she has constantly felt dizzy and SOB since that period of time. States she feels that she overall has felt poorly since starting amiodarone, which has persisted even with decreased doses.    She notes that she has had a trying last month from a health and emotional perspective. Notes the passing of her  has been challenging in the  "setting of her health. States she previously was cooking her own meals and avoiding salt but notes that she has been eating at the AL facility more often and that they use salt in their cooking. States she has noted that she has gained ~10lbs over the last week. States that some of this is probably due to the salt, but she has also been in a difficult spot as she is being treated for UTI and she was told she should be drinking extra fluid to help flush things out.    States she has had prominent RUSSELL and fatigue. Also notes that her legs feel much more swollen and her pants are fitting tighter. She has been compliant with all of her medications. No chest pain or anginal symptoms. No bleeding.     Of note, per note with EP team today, noted that she was V paced with underlying AF with no ectopy change since 10/5/18. Recommended direct admit to [place] [phone] for ongoing care as patient was having worsening symptoms as well as signs of excess volume             Review of Systems:   10 point ROS negative unless noted above         Past Medical History:     Last Echocardiogram:   TTE 5/26/16 at [facility] (report)    Device interrogation 10/5/2018: \"Medtronic, dual chamber pacemaker\" \"rhythm is AF-VS/ ~60 bpm. AF remains 100%. She is taking eliquis. Normal device function. AP= 0% and = 84%. No short V-V intervals recorded. Lead trends appear stable. Battery estimates 6.5 years to ZENAIDA.\"      Medical History reviewed.   Past Medical History:   Diagnosis Date     Adjustment disorder with anxious mood 5/18/2015     Advanced directives, counseling/discussion 8/30/2012    Patient states has Advance Directive and will bring in a copy to clinic. 8/30/2012   Tevin May  [facility] Medical Assistant \       Anemia 9/25/2015     Basal cell cancer      CHF (congestive heart failure) (H) 9/18/2014     CKD (chronic kidney disease) stage 3, GFR 30-59 ml/min (H) 9/29/2015     DDD (degenerative disc disease), lumbar 9/25/2015     " Diffuse idiopathic pulmonary fibrosis (H) 5/6/2013     Encounter for palliative care 5/18/2015     History of blood transfusion 9/29/2015     Hypertension goal BP (blood pressure) < 140/90 9/7/2012     Hypothyroid 9/7/2012     Irritable bowel syndrome 10/29/2013     Macular degeneration      Macular degeneration, 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     Nondisplaced spiral fracture of shaft of humerus      Osteoporosis 8/13/2013     Imo Update utility     RA (rheumatoid arthritis) (H) 5/7/2013     Rheumatic fever      Shingles      Spinal stenosis of lumbar region with neurogenic claudication 9/14/2015             Past Surgical History:   Surgical History reviewed.   Past Surgical History:   Procedure Laterality Date     ANESTHESIA CARDIOVERSION N/A 9/14/2018    Procedure: ANESTHESIA CARDIOVERSION;;  Surgeon: GENERIC ANESTHESIA PROVIDER;  Location: [facility] OR     APPENDECTOMY       BIOPSY      hemorrhoidectomy     ENT SURGERY      tonsillectomy     GYN SURGERY      3 D & C's     HYSTERECTOMY, PAP NO LONGER INDICATED       LAMINECTOMY LUMBAR ONE LEVEL N/A 10/13/2015    Procedure: LAMINECTOMY LUMBAR ONE LEVEL;  Surgeon: Fransico Toussaint MD;  Location:  OR             Social History:   Social History reviewed.   Social History   Substance Use Topics     Smoking status: Never Smoker     Smokeless tobacco: Never Used     Alcohol use Yes      Comment: rare wine              Family History:   Family History reviewed.  Family History   Problem Relation Age of Onset     Hypertension Mother      Psychotic Disorder Father      Diabetes Son      Diabetes Daughter      Blood Disease Daughter              Allergies:     Allergies   Allergen Reactions     Cephalexin Hcl Diarrhea     Gabapentin Other (See Comments)     Dizzsiness     Naproxen GI Disturbance     Perfume      Lactase Other (See Comments)     Macrobid [Nitrofurantoin Anhydrous]      Possibly related to lung disease      Seasonal Allergies      Sulfa Drugs      Throat swelling      Xanax [Alprazolam] Other (See Comments)     Dizziness      Ciprofloxacin Itching and Rash             Medications:   Medications Reviewed.   Current Facility-Administered Medications   Medication     acetaminophen (TYLENOL) tablet 650 mg     albuterol neb solution 2.5 mg     amiodarone (PACERONE/CODARONE) tablet 200 mg     apixaban ANTICOAGULANT (ELIQUIS) tablet 2.5 mg     [START ON 10/9/2018] ascorbic acid (VITAMIN C) tablet 500 mg     [START ON 10/9/2018] azaTHIOprine (IMURAN) tablet 50 mg     calcium carbonate 600 mg-vitamin D 400 units (CALTRATE) per tablet 1 tablet     cetirizine (zyrTEC) tablet 10 mg     folic acid (FOLVITE) tablet 1 mg     [START ON 10/9/2018] hydrochlorothiazide (HYDRODIURIL) tablet 25 mg     [START ON 10/9/2018] levothyroxine (SYNTHROID/LEVOTHROID) tablet 75 mcg     lidocaine (LMX4) cream     lidocaine 1 % 1 mL     losartan (COZAAR) tablet 50 mg     medication instruction     [START ON 10/9/2018] metoprolol succinate (TOPROL-XL) 24 hr tablet 25 mg     mirtazapine (REMERON) tablet 15 mg     Patient is already receiving anticoagulation with heparin, enoxaparin (LOVENOX), warfarin (COUMADIN)  or other anticoagulant medication     ranitidine (ZANTAC) tablet 150 mg     sodium chloride (PF) 0.9% PF flush 3 mL     sodium chloride (PF) 0.9% PF flush 3 mL             Physical Exam:   Vitals were reviewed.  Blood pressure 180/87, temperature 97.7  F (36.5  C), temperature source Oral, resp. rate 16, not currently breastfeeding.    General: elderly female in NAD  HEENT: at/nc, eomi, mmm, neck veins distended to angle of jaw at 30 degrees  CV: regular, normal S1S2, 2/6 holosystolic murmur at the apex  Resp: CTAB with good inspiratory effort  Abd: soft, slightly distended but NT   Skin: warm and dry, no rash  Extremities: moving all 4 with no limitations, 1+ LE edema  Neuro: alert, interactive, speech fluent, CN II-XII grossly intact, negative pronator drift, sensation grossly intact, gait wnl          Data:        ROUTINE LABS (Last four results)  CMP    Recent Labs  Lab 10/08/18  1353   *   POTASSIUM 4.7   CHLORIDE 98   CO2 26   ANIONGAP 6   GLC 93   BUN 34*   CR 1.19*   GFRESTIMATED 43*   GFRESTBLACK 52*   FATOUMATA 8.7     CBC  Recent Labs  Lab 10/08/18  1353   WBC 6.2   RBC 3.38*   HGB 11.2*   HCT 33.6*   MCV 99   MCH 33.1*   MCHC 33.3   RDW 14.0        INR    Recent Labs  Lab 10/08/18  1353   INR 1.28*     Imaging:  No interval imaging obtained

## 2018-10-08 NOTE — PROGRESS NOTES
Preliminary Device Interrogation Results.  Final physician signed paceart report to be scanned and attached.    Patient seen in clinic for evaluation and iterative programming of Medtronic Advisa dual lead pacemaker per MD orders.  Patient is scheduled to see Minna Gallagher NP today and noted that patient has been feeling worse since last night and wanted device checked for sending her to the hospital today.  Normal pacemaker function.  Only the AF episode in progress recorded. No Ventricular High rates.   Intrinsic rhythm = AF with ventricular response @ 58 bpm. Presenting Rhythm AF with @ 60 bpm.    AP = 4.3%.   = 79.4%. Estimated battery longevity to ZENAIDA = 6.5 years. Patient is scheduled to return to clinic 12/19 for a device check and to see Dr. Carrasquillo.    dual lead pacemaker

## 2018-10-08 NOTE — IP AVS SNAPSHOT
MRN:[id number]                      After Visit Summary   10/8/2018    Linda Spicer    MRN: [id number]           Thank you!     Thank you for choosing [facility] for your care. Our goal is always to provide you with excellent care. Hearing back from our patients is one way we can continue to improve our services. Please take a few minutes to complete the written survey that you may receive in the mail after you visit with us. Thank you!        Patient Information     Date Of Birth          6/13/1931        Designated Caregiver       Most Recent Value    Caregiver    Will someone help with your care after discharge? no      About your hospital stay     You were admitted on:  October 8, 2018 You last received care in the:  [address] [facility]    You were discharged on:  October 12, 2018        Reason for your hospital stay       You were admitted for extra fluid and atrial fibrillation. We gave you medications to help you pee and converted you to a normal heart rhythm.                  Who to Call     For medical emergencies, please call 911.  For non-urgent questions about your medical care, please call your primary care provider or clinic, [phone]  For questions related to your surgery, please call your surgery clinic        Attending Provider     Provider Specialty    Talat Littlejohn MD Cardiology       Primary Care Provider Office Phone # Fax #    Tre Lockett [phone]      After Care Instructions     Activity       Your activity upon discharge: activity as tolerated            Diet       Follow this diet upon discharge: Orders Placed This Encounter      Fluid restriction 1800 ML FLUID      Snacks/Supplements Adult: Other; Pt does not tolerate milk. Tolerates most other dairy products.; With Meals      Low Saturated Fat Na <2400 mg            Monitor and record       weight every day                  Follow-up Appointments     Adult [facility] Follow-up  and recommended labs and tests       Follow up in the CORE clinic and with EP as scheduled.  Follow up with PCP as needed.    Appointments on [place] and/or [facility] (with [facility] or [facility] provider or service). Call [phone] if you haven't heard regarding these appointments within 7 days of discharge.                  Your next 10 appointments already scheduled     Oct 17, 2018 10:10 AM CDT   LAB with LAB FIRST FLOOR [facility] ([facility])    [address] [place] [id number] [phone]           Please do not eat 10-12 hours before your appointment if you are coming in fasting for labs on lipids, cholesterol, or glucose (sugar). This does not apply to pregnant women. Water, hot tea and black coffee (with nothing added) are okay. Do not drink other fluids, diet soda or chew gum.            Oct 18, 2018 10:00 AM CDT   CORE Enrollment with Priscilla Agrawal PA-C   [facility] ([facility])    [address] [place] [address] 2nd [facility] [address]   [phone]            Oct 24, 2018  9:00 AM CDT   Level 1 with BAY 8 INFUSION   [facility] ([facility])    [address] [place] [place] [id number] [phone]            Nov 07, 2018 11:30 AM CST   Lab with UC LAB   [facility] ([facility])    [address] [place] [address]  1st [facility] [id number] [phone]            Nov 07, 2018 12:00 PM CST   FULL PULMONARY FUNCTION with UC PFL A   [facility] Pulmonary Function Testing ([facility])    [address] [place] [address]  3rd [facility] [id number] [phone]            Nov 07, 2018  1:30 PM CST   (Arrive by 1:15 PM)   Pacemaker Check with Uc Cv Device 1   [facility] ([facility])    [address] [place] [address]  3rd [facility]  [id number]               Nov 07, 2018  2:00  PM CST   (Arrive by 1:45 PM)   RETURN ARRHYTHMIA with Franki Carrasquillo MD   [facility] ([facility])    [address] [facility]  [address]  [facility] [address] [phone]            Nov 08, 2018  9:30 AM CST   Level 1 with BAY 9 INFUSION   [facility] ([facility])    [address] [facility] [address] [phone]            Dec 11, 2018 10:00 AM CST   Office Visit with PFT LAB   [facility] ([facility])    [address] [facility] [address] [phone]           Bring a current list of meds and any records pertaining to this visit. For Physicals, please bring immunization records and any forms needing to be filled out. Please arrive 10 minutes early to complete paperwork.            Dec 11, 2018 11:00 AM CST   Return Visit with Dea Acosta MD   [facility])    [address] [address] [phone]              Additional Services     Home Care PT Referral for Hospital Discharge       Home safety evaluation    Your provider has ordered home care - physical therapy. If you have not been contacted within 2 days of your discharge please call the department phone number listed on the top of this document.            Home care nursing referral       [facility]   Phone: [phone]   Fax: [phone]    For RN eval post hospitalization.   Resume previous orders.   Assess: vital signs, respiratory and cardiac status, activity tolerance, hydration, nutritional status.   Med set up and management.   Heart failure education reinforcement.   HHA for assist with ADL's.   PT/OT eval and treat for deconditioning, strengthening, and endurance.      Your provider has ordered home care nursing services. If you have not been contacted within 2 days of your discharge please call the inpatient department phone  number at [phone] .            Medication Therapy Management Referral       MTM referral reason            Patient has 5 PTA or Discharge Medications AND one of the following   diagnoses: DM,HF,COPD,AMI DX,PULM HTN       This service is designed to help you get the most from your medications.  A specially trained pharmacist will work closely with you and your doctors  to solve any problems related to your medications and to help you get the   best results from taking them.      The Medication Therapy Management staff will call you to schedule an appointment.                  General Recommendations To Control Heart Failure When You Get Home     Heart Failure Instructions for Patients and Families: Please read and check off each of these important instructions as you do them when you get home.     Weight and Symptoms    ___ Put a scale in your bathroom.    ___ Post a weight chart or calendar next to your scale.    ___ Weigh yourself everyday as soon as you get up in the morning (before breakfast).  You should only be wearing your pajamas.  Write your weight on the chart/calendar.    ___ Bring your weight chart/calendar with you to all appointments.    ___ Call your doctor or nurse practitioner if you gain 2 pounds (in 1 day) or 5 pounds in (1 week) from your goal  good  weight.  Your good weight is also called your  dry  weight.  Your doctor or nurse will tell you what your good weight should be.    ___ Call your doctor or nurse practitioner if you have shortness of breath that gets worse over time, leg swelling or fatigue.    Medications and Diet    ___ Make sure to take your medication as prescribed.    ___ Bring a current list of your medication and all of your medicine bottles with you to all appointments.    ___ Limit fluids if you still have swelling or shortness of breath, or if your doctor tells you to do so.      ___ Eat less than 2000 mg of sodium (salt) every day. Read food labels, and do not add salt  to meals. Remember, if you eat less salt you retain less fluid.    ___ Follow a heart healthy diet that is low in saturated fat.         Activity and Suggested Lifestyle Changes   ___ Stay active. Talk to your doctor about an exercise program that is safe for your heart.    ___ Stop smoking. Reduce alcohol use.      ___ Lose weight if you are overweight. Extra weight puts a lot of stress on the heart.    Control for Leg Swelling  ___ Keep your legs elevated (raised) as needed for swelling. If swelling is uncomfortable or elevation doesn t help, ask your doctor about using ACE wraps or support stockings.      What is the C.O.R.E. Clinic?     Cardiomyopathy  Optimization  Rehabilitation  Education    The C.O.R.E. Clinic is a heart failure specialty clinic within [facility].  It is an outpatient disease management program that is based on a phase-by-phase approach, which is tailored to each patient s individual needs.  The cardiologist, nurse practitioner, physician assistant and nurses provide an ongoing outpatient care and treatment plan that guides heart failure and cardiomyopathy patients from evaluation and education to stabilization. This team works with your current primary care doctor and cardiologist to help you:      Avoid hospitalizations    Slow the progression of the disease    Improve length and quality of life    Know who and when to call if heart failure symptoms appear    Receive easy access to quality health care and advice    Better understand your condition and treatment    Decrease the tremendous cost burden of heart failure care    Detect future heart problems before they become life threatening    Your C.O.R.E. Clinic Team will continue to educate you on your heart failure and may adjust medications based on your vital signs, lab work, and how you are feeling.  Therefore, it is very important to bring the following to all C.O.R.E. appointments:    - An accurate list of your  medications  - Your medicine bottles  - Your weight chart/calendar    [facility] ([place]):    [phone]  [facility] ([place]):    [phone]  [facility] ([place]):  [phone]        Pending Results     Date and Time Order Name Status Description    10/11/2018 1551 Urine Culture Aerobic Bacterial Preliminary     10/11/2018 0944 EKG 12-lead, complete Preliminary     10/11/2018 0009 Cardioversion In process             Statement of Approval     Ordered          10/12/18 0832  I have reviewed and agree with all the recommendations and orders detailed in this document.  EFFECTIVE NOW     Approved and electronically signed by:  Talat Umaña MD             Admission Information     Date & Time Provider Department Dept. Phone    10/8/2018 Talat Littlejohn MD [address] [facility] [phone]      Your Vitals Were     Blood Pressure Pulse Temperature Respirations Weight Last Period    146/56 (BP Location: Left arm) 60 98.4  F (36.9  C) (Oral) 18 71.5 kg (157 lb 9.6 oz) (LMP Unknown)    Pulse Oximetry BMI (Body Mass Index)                96% 27.48 kg/m2          MyChart Information     [facility] gives you secure access to your electronic health record. If you see a primary care provider, you can also send messages to your care team and make appointments. If you have questions, please call your primary care clinic.  If you do not have a primary care provider, please call [phone] and they will assist you.        Care EveryWhere ID     This is your Care EveryWhere ID. This could be used by other organizations to access your [facility] medical records  [id number]        Equal Access to Services     [facility]: Hadii rakesh Goyal, waaxda luqadaha, qaybta kaalmazahraa cotton. So [facility] [phone].    ATENCIÓN: Si habla español, tiene a merchant disposición servicios gratuitos de  brendancodey [place] al [phone].    We comply with applicable federal civil rights laws and [place] laws. We do not discriminate on the basis of race, color, national origin, age, disability, sex, sexual orientation, or gender identity.               Review of your medicines      START taking        Dose / Directions    amLODIPine 5 MG tablet   Commonly known as:  NORVASC   Used for:  Hypertension goal BP (blood pressure) < 150/90        Dose:  5 mg   Take 1 tablet (5 mg) by mouth daily   Quantity:  90 tablet   Refills:  3       cefdinir 300 MG capsule   Commonly known as:  OMNICEF   Indication:  Urinary Tract Infection   Used for:  Acute cystitis without hematuria        Dose:  300 mg   Take 1 capsule (300 mg) by mouth daily for 6 days   Quantity:  6 capsule   Refills:  0       clotrimazole 1 % cream   Commonly known as:  LOTRIMIN   Used for:  Vaginitis and vulvovaginitis        Dose:  1 Applicatorful   Place 1 Applicatorful vaginally daily   Quantity:  50 g   Refills:  1       furosemide 40 MG tablet   Commonly known as:  LASIX   Used for:  Heart failure with preserved ejection fraction, NYHA class I (H)        Dose:  20 mg   Take 0.5 tablets (20 mg) by mouth 2 times daily   Quantity:  60 tablet   Refills:  3         CONTINUE these medicines which have NOT CHANGED        Dose / Directions    ACETAMINOPHEN PO        Dose:  1000 mg   Take 1,000 mg by mouth 2 times daily as needed   Refills:  0       albuterol (2.5 MG/3ML) 0.083% neb solution        Dose:  1 vial   Take 1 vial by nebulization every 6 hours as needed for shortness of breath / dyspnea or wheezing   Refills:  0       amiodarone 200 MG tablet   Commonly known as:  PACERONE/CODARONE   Used for:  Paroxysmal atrial fibrillation (H)        1st week: 400mg twice a day. 2nd week: 200mg twice a day. Then 200mg daily   Quantity:  120 tablet   Refills:  3       apixaban ANTICOAGULANT 2.5 MG tablet   Commonly known as:  ELIQUIS   Used for:  Atrial  flutter, paroxysmal (H)        Dose:  2.5 mg   Take 1 tablet (2.5 mg) by mouth 2 times daily   Quantity:  180 tablet   Refills:  3       azaTHIOprine 50 MG tablet   Commonly known as:  IMURAN   Used for:  Rheumatoid arthritis involving multiple sites with positive rheumatoid factor (H)        Dose:  50 mg   Take 1 tablet (50 mg) by mouth daily   Quantity:  90 tablet   Refills:  2       Blood Pressure Monitor Kit   Used for:  CHF (congestive heart failure) (H)        Dose:  1 kit   1 kit daily as needed   Quantity:  1 kit   Refills:  0       calcium carbonate 600 mg-vitamin D 400 units 600-400 MG-UNIT per tablet   Commonly known as:  CALTRATE        Dose:  1 tablet   Take 1 tablet by mouth 2 times daily   Refills:  0       COMPOUNDED NON-CONTROLLED SUBSTANCE - PHARMACY TO MIX COMPOUNDED MEDICATION   Commonly known as:  CMPD RX   Used for:  Atrophic vaginitis        Estriol 1mg/gram. Place 1 gram vaginally daily for two weeks, then vaginally twice weekly after.   Quantity:  30 g   Refills:  6       fish oil-omega-3 fatty acids 1000 MG capsule        Dose:  2 g   Take 2 g by mouth daily. 2 capsules daily   Refills:  0       folic acid 1 MG tablet   Commonly known as:  FOLVITE   Used for:  Oral aphthae        Dose:  1 mg   Take 1 tablet (1 mg) by mouth daily   Quantity:  90 tablet   Refills:  3       GENTEAL MILD OP        Apply  to eye daily.   Refills:  0       hydrocortisone 2.5 % cream   Used for:  Rash        Apply BID to affected region(s) for 7-10 days.   Quantity:  30 g   Refills:  0       levothyroxine 75 MCG tablet   Commonly known as:  SYNTHROID/LEVOTHROID   Used for:  Hypothyroidism, unspecified type        TAKE ONE TABLET BY MOUTH ONCE DAILY   Quantity:  90 tablet   Refills:  1       loratadine 10 MG tablet   Commonly known as:  CLARITIN        Dose:  10 mg   Take 10 mg by mouth every morning   Refills:  0       metoprolol succinate 25 MG 24 hr tablet   Commonly known as:  TOPROL-XL   Used for:   Hypertension goal BP (blood pressure) < 140/90        Dose:  25 mg   Take 1 tablet (25 mg) by mouth daily   Quantity:  90 tablet   Refills:  3       mirtazapine 15 MG tablet   Commonly known as:  REMERON   Used for:  Adjustment insomnia        Dose:  15 mg   Take 1 tablet (15 mg) by mouth At Bedtime   Quantity:  30 tablet   Refills:  1       nitroGLYcerin 0.4 MG sublingual tablet   Commonly known as:  NITROSTAT   Used for:  Chest pain        Dose:  0.4 mg   Place 1 tablet (0.4 mg) under the tongue every 5 minutes as needed for chest pain   Quantity:  25 tablet   Refills:  0       * order for DME   Used for:  Rheumatoid arthritis involving left wrist with positive rheumatoid factor (H)        Equipment being ordered: Dispense face mask. Mrs. Spicer is immunosuppressed due to rheumatoid arthritis.   Quantity:  1 Box   Refills:  11       * order for DME   Used for:  Hypoxia        Equipment being ordered: Nebulizer. Use with Albuterol.   Quantity:  1 each   Refills:  0       order for DME   Used for:  Pneumonia of left upper lobe due to Mycoplasma pneumoniae        Equipment being ordered: Dispense baffle, for use with nebulizer.   Quantity:  1 each   Refills:  0       * order for DME   Used for:  Pain of toe of right foot, Status post bunionectomy        Dispense SP Walker-small   Quantity:  1 each   Refills:  0       PRESERVISION AREDS PO        Dose:  1 tablet   Take 1 tablet by mouth daily   Refills:  0       ranibizumab 0.3 MG/0.05ML Soln   Commonly known as:  LUCENTIS        Dose:  0.3 mg   0.3 mg by Intravitreal route Every 6 weeks   Refills:  0       ranitidine 150 MG tablet   Commonly known as:  ZANTAC   Used for:  Gastroesophageal reflux disease without esophagitis        Dose:  150 mg   Take 1 tablet (150 mg) by mouth 2 times daily   Quantity:  60 tablet   Refills:  5       REFRESH P.M. Oint        Apply  to eye. Daily at bedtime   Refills:  0       saccharomyces boulardii 250 MG capsule   Commonly known as:   FLORASTOR        Dose:  250 mg   Take 250 mg by mouth daily   Refills:  0       senna-docusate 8.6-50 MG per tablet   Commonly known as:  SENOKOT-S;PERICOLACE   Used for:  Constipation, chronic        Dose:  2 tablet   Take 2 tablets by mouth At Bedtime Hold if diarrhea occurs.   Quantity:  120 tablet   Refills:  11       VITAMIN C PO        Dose:  500 mg   Take 500 mg by mouth daily   Refills:  0       ZYRTEC ALLERGY 10 MG tablet   Generic drug:  cetirizine        Dose:  10 mg   Take 10 mg by mouth At Bedtime   Refills:  0       * Notice:  This list has 3 medication(s) that are the same as other medications prescribed for you. Read the directions carefully, and ask your doctor or other care provider to review them with you.      STOP taking     hydrochlorothiazide 25 MG tablet   Commonly known as:  HYDRODIURIL           losartan 50 MG tablet   Commonly known as:  COZAAR           STATIN NOT PRESCRIBED (INTENTIONAL)                Where to get your medicines      These medications were sent to [facility] [address] [place] [id number]     Phone:  [phone]     amLODIPine 5 MG tablet    cefdinir 300 MG capsule    clotrimazole 1 % cream    furosemide 40 MG tablet                Protect others around you: Learn how to safely use, store and throw away your medicines at www.disposemymeds.org.        ANTIBIOTIC INSTRUCTION     You've Been Prescribed an Antibiotic - Now What?  Your healthcare team thinks that you or your loved one might have an infection. Some infections can be treated with antibiotics, which are powerful, life-saving drugs. Like all medications, antibiotics have side effects and should only be used when necessary. There are some important things you should know about your antibiotic treatment.      Your healthcare team may run tests before you start taking an antibiotic.    Your team may take samples (e.g., from your blood, urine or  other areas) to run tests to look for bacteria. These test can be important to determine if you need an antibiotic at all and, if you do, which antibiotic will work best.      Within a few days, your healthcare team might change or even stop your antibiotic.    Your team may start you on an antibiotic while they are working to find out what is making you sick.    Your team might change your antibiotic because test results show that a different antibiotic would be better to treat your infection.    In some cases, once your team has more information, they learn that you do not need an antibiotic at all. They may find out that you don't have an infection, or that the antibiotic you're taking won't work against your infection. For example, an infection caused by a virus can't be treated with antibiotics. Staying on an antibiotic when you don't need it is more likely to be harmful than helpful.      You may experience side effects from your antibiotic.    Like all medications, antibiotics have side effects. Some of these can be serious.    Let you healthcare team know if you have any known allergies when you are admitted to the hospital.    One significant side effect of nearly all antibiotics is the risk of severe and sometimes deadly diarrhea caused by Clostridium difficile (C. Difficile). This occurs when a person takes antibiotics because some good germs are destroyed. Antibiotic use allows C. diificile to take over, putting patients at high risk for this serious infection.    As a patient or caregiver, it is important to understand your or your loved one's antibiotic treatment. It is especially important for caregivers to speak up when patients can't speak for themselves. Here are some important questions to ask your healthcare team.    What infection is this antibiotic treating and how do you know I have that infection?    What side effects might occur from this antibiotic?    How long will I need to take this  antibiotic?    Is it safe to take this antibiotic with other medications or supplements (e.g., vitamins) that I am taking?     Are there any special directions I need to know about taking this antibiotic? For example, should I take it with food?    How will I be monitored to know whether my infection is responding to the antibiotic?    What tests may help to make sure the right antibiotic is prescribed for me?      Information provided by:  www.cdc.gov/getsmart  U.S. Department of Health and Human Services  Centers for disease Control and Prevention  National Center for Emerging and Zoonotic Infectious Diseases  Division of Healthcare Quality Promotion             Medication List: This is a list of all your medications and when to take them. Check marks below indicate your daily home schedule. Keep this list as a reference.      Medications           Morning Afternoon Evening Bedtime As Needed    ACETAMINOPHEN PO   Take 1,000 mg by mouth 2 times daily as needed                                albuterol (2.5 MG/3ML) 0.083% neb solution   Take 1 vial by nebulization every 6 hours as needed for shortness of breath / dyspnea or wheezing                                amiodarone 200 MG tablet   Commonly known as:  PACERONE/CODARONE   1st week: 400mg twice a day. 2nd week: 200mg twice a day. Then 200mg daily   Last time this was given:  200 mg on 10/12/2018  8:33 AM                                      amLODIPine 5 MG tablet   Commonly known as:  NORVASC   Take 1 tablet (5 mg) by mouth daily   Last time this was given:  5 mg on 10/12/2018  8:31 AM                                   apixaban ANTICOAGULANT 2.5 MG tablet   Commonly known as:  ELIQUIS   Take 1 tablet (2.5 mg) by mouth 2 times daily   Last time this was given:  2.5 mg on 10/12/2018  8:33 AM                                      azaTHIOprine 50 MG tablet   Commonly known as:  IMURAN   Take 1 tablet (50 mg) by mouth daily   Last time this was given:  50 mg on  10/12/2018  8:30 AM                                   Blood Pressure Monitor Kit   1 kit daily as needed                                calcium carbonate 600 mg-vitamin D 400 units 600-400 MG-UNIT per tablet   Commonly known as:  CALTRATE   Take 1 tablet by mouth 2 times daily   Last time this was given:  1 tablet on 10/12/2018  8:26 AM                                      cefdinir 300 MG capsule   Commonly known as:  OMNICEF   Take 1 capsule (300 mg) by mouth daily for 6 days   Last time this was given:  300 mg on 10/12/2018  8:41 AM                                   clotrimazole 1 % cream   Commonly known as:  LOTRIMIN   Place 1 Applicatorful vaginally daily   Last time this was given:  1 Applicatorful on 10/12/2018  5:00 AM                                COMPOUNDED NON-CONTROLLED SUBSTANCE - PHARMACY TO MIX COMPOUNDED MEDICATION   Commonly known as:  CMPD RX   Estriol 1mg/gram. Place 1 gram vaginally daily for two weeks, then vaginally twice weekly after.                                fish oil-omega-3 fatty acids 1000 MG capsule   Take 2 g by mouth daily. 2 capsules daily   Last time this was given:  1 g on 10/12/2018  8:31 AM                                      folic acid 1 MG tablet   Commonly known as:  FOLVITE   Take 1 tablet (1 mg) by mouth daily   Last time this was given:  1 mg on 10/12/2018  8:30 AM                                   furosemide 40 MG tablet   Commonly known as:  LASIX   Take 0.5 tablets (20 mg) by mouth 2 times daily                                GENTEAL MILD OP   Apply  to eye daily.                                hydrocortisone 2.5 % cream   Apply BID to affected region(s) for 7-10 days.                                levothyroxine 75 MCG tablet   Commonly known as:  SYNTHROID/LEVOTHROID   TAKE ONE TABLET BY MOUTH ONCE DAILY   Last time this was given:  75 mcg on 10/12/2018  8:26 AM                                   loratadine 10 MG tablet   Commonly known as:  CLARITIN   Take 10 mg  by mouth every morning                                metoprolol succinate 25 MG 24 hr tablet   Commonly known as:  TOPROL-XL   Take 1 tablet (25 mg) by mouth daily   Last time this was given:  25 mg on 10/12/2018  8:26 AM                                   mirtazapine 15 MG tablet   Commonly known as:  REMERON   Take 1 tablet (15 mg) by mouth At Bedtime   Last time this was given:  15 mg on 10/11/2018  7:53 PM                                   nitroGLYcerin 0.4 MG sublingual tablet   Commonly known as:  NITROSTAT   Place 1 tablet (0.4 mg) under the tongue every 5 minutes as needed for chest pain                                * order for DME   Equipment being ordered: Dispense face mask. Mrs. Spicer is immunosuppressed due to rheumatoid arthritis.                                * order for DME   Equipment being ordered: Nebulizer. Use with Albuterol.                                order for DME   Equipment being ordered: Dispense baffle, for use with nebulizer.                                * order for DME   Dispense SP Walker-small                                PRESERVISION AREDS PO   Take 1 tablet by mouth daily   Last time this was given:  1 tablet on 10/12/2018  8:26 AM                                   ranibizumab 0.3 MG/0.05ML Soln   Commonly known as:  LUCENTIS   0.3 mg by Intravitreal route Every 6 weeks                                ranitidine 150 MG tablet   Commonly known as:  ZANTAC   Take 1 tablet (150 mg) by mouth 2 times daily   Last time this was given:  150 mg on 10/12/2018  8:25 AM                                      REFRESH P.M. Oint   Apply  to eye. Daily at bedtime                                saccharomyces boulardii 250 MG capsule   Commonly known as:  FLORASTOR   Take 250 mg by mouth daily                                senna-docusate 8.6-50 MG per tablet   Commonly known as:  SENOKOT-S;PERICOLACE   Take 2 tablets by mouth At Bedtime Hold if diarrhea occurs.   Last time this was given:  1  tablet on 10/10/2018  9:57 AM                                VITAMIN C PO   Take 500 mg by mouth daily   Last time this was given:  500 mg on 10/12/2018  8:30 AM                                   ZYRTEC ALLERGY 10 MG tablet   Take 10 mg by mouth At Bedtime   Last time this was given:  10 mg on 10/11/2018  7:53 PM   Generic drug:  cetirizine                                   * Notice:  This list has 3 medication(s) that are the same as other medications prescribed for you. Read the directions carefully, and ask your doctor or other care provider to review them with you.

## 2018-10-08 NOTE — H&P
"      Cardiology Consult Note      Linda Spicer  [id number]  [address]   [facility] [address]  87 year old, female  Admission Date/Time: 10/8/2018 12:58 PM  PMD: Tre Lockett, [phone]  Requesting physician: TREE Gallagher.  Date of service: 10/8/2018    Reason for consult: We were asked to see Linda Spicer by TREE Gallagher.    Subjective    HPI:   Linda Spicer is a 87 year old female, with h/o HFpEF (EF 65% 5/2016), paroxysmal A.fib (CHADSVASC 5 on Apixiban) s/p DCCV (6/2018, 9/14/2018), sick sinus syndrome s/p pacemaker (2017), rheumatoid pulmonary fibrosis, CKD stage 3, HTN, hypothyroidism, IBS, RA, anxiety, presenting with one day of dizziness, fatigue, and dyspnea, and 10 lb weight gain within 2 weeks. Last night, patient stated that she was feeling dizzy while preparing dinner, associated with continuous dyspnea, fatigue. She noted that she felt worse than her \"normal AF symptoms of fatigue and dyspnea on exertion.\" She did not think much of it, and decided to go to bed. The next morning, she continued to have worsening dizziness, dyspnea, and fatigue, prompting her to call her daughter and visit the clinic with TREE Gallagher. On pacemaker interrogation, showed 100% AF, Vrate 61.Patient stated that she was started on Amiodarone 2.5 weeks ago (9/19/18) and has been compliant with this, but did state that she has been drinking more liquids orally than before due to recent UTI, and noticed a 10 lb weight gain within the past 2 weeks. Patient denied chest pain/pressure, syncope, vision changes, facial drooping, extremity weakness.     Of note, she has been on propafenone since 2017, but with continued recurrence of A.fib. She is not a good candidate for AF ablation due to risk of bleeding and her old age. Multaq was too expensive and not covered by her insurance. Sotalol and Dofetilide were both not usable due to her CKD.    Review of Systems:  A comprehensive " review of systems is negative except for those items noted in HPI.     Past Medical History:   Diagnosis Date     Adjustment disorder with anxious mood 5/18/2015     Advanced directives, counseling/discussion 8/30/2012    Patient states has Advance Directive and will bring in a copy to clinic. 8/30/2012   Tevin May  [facility] Medical Assistant \       Anemia 9/25/2015     Basal cell cancer      CHF (congestive heart failure) (H) 9/18/2014     CKD (chronic kidney disease) stage 3, GFR 30-59 ml/min (H) 9/29/2015     DDD (degenerative disc disease), lumbar 9/25/2015     Diffuse idiopathic pulmonary fibrosis (H) 5/6/2013     Encounter for palliative care 5/18/2015     History of blood transfusion 9/29/2015     Hypertension goal BP (blood pressure) < 140/90 9/7/2012     Hypothyroid 9/7/2012     Irritable bowel syndrome 10/29/2013     Macular degeneration      Macular degeneration, 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     Nondisplaced spiral fracture of shaft of humerus      Osteoporosis 8/13/2013     Imo Update utility     RA (rheumatoid arthritis) (H) 5/7/2013     Rheumatic fever      Shingles      Spinal stenosis of lumbar region with neurogenic claudication 9/14/2015       Past Surgical History:   Procedure Laterality Date     ANESTHESIA CARDIOVERSION N/A 9/14/2018    Procedure: ANESTHESIA CARDIOVERSION;;  Surgeon: GENERIC ANESTHESIA PROVIDER;  Location: [facility] OR     APPENDECTOMY       BIOPSY      hemorrhoidectomy     ENT SURGERY      tonsillectomy     GYN SURGERY      3 D & C's     HYSTERECTOMY, PAP NO LONGER INDICATED       LAMINECTOMY LUMBAR ONE LEVEL N/A 10/13/2015    Procedure: LAMINECTOMY LUMBAR ONE LEVEL;  Surgeon: Fransico Toussaint MD;  Location: [facility] OR       Prescriptions Prior to Admission   Medication Sig Dispense Refill Last Dose     ACETAMINOPHEN PO Take 1,000 mg by mouth 2 times daily as needed    Taking     albuterol (2.5 MG/3ML) 0.083% neb solution Take 1 vial by nebulization every 6 hours as needed for shortness of  breath / dyspnea or wheezing   Taking     amiodarone (PACERONE/CODARONE) 200 MG tablet 1st week: 400mg twice a day. 2nd week: 200mg twice a day. Then 200mg daily 120 tablet 3 Taking     apixaban ANTICOAGULANT (ELIQUIS) 2.5 MG tablet Take 1 tablet (2.5 mg) by mouth 2 times daily 180 tablet 3 Taking     Artificial Tear Ointment (REFRESH P.M.) OINT Apply  to eye. Daily at bedtime      Taking     Ascorbic Acid (VITAMIN C PO) Take 500 mg by mouth daily    Taking     azaTHIOprine (IMURAN) 50 MG tablet Take 1 tablet (50 mg) by mouth daily 90 tablet 2 Taking     Blood Pressure Monitor KIT 1 kit daily as needed 1 kit 0 Taking     calcium-vitamin D (CALTRATE) 600-400 MG-UNIT per tablet Take 1 tablet by mouth 2 times daily    Taking     cetirizine (ZYRTEC ALLERGY) 10 MG tablet Take 10 mg by mouth At Bedtime   Taking     COMPOUNDED NON-CONTROLLED SUBSTANCE (CMPD RX) - PHARMACY TO MIX COMPOUNDED MEDICATION Estriol 1mg/gram. Place 1 gram vaginally daily for two weeks, then vaginally twice weekly after. 30 g 6 Taking     fish oil-omega-3 fatty acids (FISH OIL) 1000 MG capsule Take 2 g by mouth daily. 2 capsules daily        Taking     folic acid (FOLVITE) 1 MG tablet Take 1 tablet (1 mg) by mouth daily 90 tablet 3 Taking     hydrochlorothiazide (HYDRODIURIL) 25 MG tablet Take 1 tablet (25 mg) by mouth daily 30 tablet 5 Taking     hydrocortisone 2.5 % cream Apply BID to affected region(s) for 7-10 days. 30 g 0 Taking     Hypromellose (GENTEAL MILD OP) Apply  to eye daily.   Taking     levothyroxine (SYNTHROID/LEVOTHROID) 75 MCG tablet TAKE ONE TABLET BY MOUTH ONCE DAILY 90 tablet 1 Taking     loratadine (CLARITIN) 10 MG tablet Take 10 mg by mouth every morning   Taking     losartan (COZAAR) 50 MG tablet Take 1 tablet (50 mg) by mouth 2 times daily 180 tablet 1 Taking     metoprolol succinate (TOPROL-XL) 25 MG 24 hr tablet Take 1 tablet (25 mg) by mouth daily 90 tablet 3 Taking     mirtazapine (REMERON) 15 MG tablet Take 1 tablet  (15 mg) by mouth At Bedtime 30 tablet 1 Taking     Multiple Vitamins-Minerals (PRESERVISION AREDS PO) Take 1 tablet by mouth daily    Taking     nitroglycerin (NITROSTAT) 0.4 MG SL tablet Place 1 tablet (0.4 mg) under the tongue every 5 minutes as needed for chest pain 25 tablet 0 Taking     order for DME Dispense SP Walker-small 1 each 0 Taking     order for DME Equipment being ordered: Dispense baffle, for use with nebulizer. 1 each 0 Taking     order for DME Equipment being ordered: Nebulizer. Use with Albuterol. 1 each 0 Taking     order for DME Equipment being ordered: Dispense face mask.  Mrs. Spicer is immunosuppressed due to rheumatoid arthritis. 1 Box 11 Taking     ranibizumab (LUCENTIS) 0.3 MG/0.05ML SOLN 0.3 mg by Intravitreal route Every 6 weeks   Taking     ranitidine (ZANTAC) 150 MG tablet Take 1 tablet (150 mg) by mouth 2 times daily 60 tablet 5 Taking     saccharomyces boulardii (FLORASTOR) 250 MG capsule Take 250 mg by mouth daily   Taking     senna-docusate (SENOKOT-S;PERICOLACE) 8.6-50 MG per tablet Take 2 tablets by mouth At Bedtime Hold if diarrhea occurs. 120 tablet 11 Taking     STATIN NOT PRESCRIBED, INTENTIONAL, Please choose reason not prescribed, below   Taking       Allergies   Allergen Reactions     Cephalexin Hcl Diarrhea     Gabapentin Other (See Comments)     Dizzsiness     Naproxen GI Disturbance     Perfume      Lactase Other (See Comments)     Macrobid [Nitrofurantoin Anhydrous]      Possibly related to lung disease      Seasonal Allergies      Sulfa Drugs      Throat swelling     Xanax [Alprazolam] Other (See Comments)     Dizziness      Ciprofloxacin Itching and Rash       Family Hx:  Mother-HTN  No cardiac history.    Social Hx:  Lives in independent housing. Has daughter Xi living 2 miles down the street. Recently had  pass away 1 month ago.     Objective:    /87  Temp 97.7  F (36.5  C) (Oral)  Resp 16  LMP  (LMP Unknown)     Physical Exam:  Gen: NAD.   Neck:  supple  CV: RRR, nl S1, S2 no m/r/g. JVP elevated below ear. +1 pitting lower extremity edema bilaterally.   Resp: Fine crackles heard in posterior left lung base.  Abd: +BS, soft, non-tender, non-distended  Skin: no lesions on limited exam.  Neuro: CN2-12 grossly intact. Speech fluent. Normal strength and tone. Ambulating.     Labs and Imaging:  ECHO (2016):  EF 65%, LV function normal.  Moderate MR, Moderately dilated left atrium, PAH @40 mm Hg.      Recent Labs   Lab Test  10/08/18   1353  08/15/18   0902   WBC  6.2  5.7   RBC  3.38*  3.19*   HGB  11.2*  10.6*   HCT  33.6*  31.4*   MCV  99  98   MCH  33.1*  33.2*   MCHC  33.3  33.8   PLT  234  254       Recent Labs   Lab Test  10/08/18   1353  09/14/18   1337  08/09/18   0958  01/22/18   1018   POTASSIUM  4.7  4.7  5.5*  4.3   CHLORIDE  98   --   101  103   BUN  34*   --   46*  31*       Recent Labs   Lab Test  10/08/18   1353  08/15/18   0902  05/02/18   1050  02/07/18   1008   09/29/15   1253   INR  1.28*   --    --    --    --   1.13   PLT  234  254  257  256   < >  237    < > = values in this interval not displayed.       Problem List:  Active Problems:    CHF exacerbation (H)      Impression/Plan:  Linda Spicer is a 87 year old female, with h/o HFpEF (EF 65% 5/2016), paroxysmal A.fib (CHADSVASC 5 on Apixiban) s/p DCCV (6/2018, 9/14/2018), sick sinus syndrome s/p pacemaker (2017), rheumatoid pulmonary fibrosis, CKD stage 3, HTN, hypothyroidism, IBS, RA, anxiety, presenting with one day of dizziness, fatigue, and dyspnea, and 10 lb weight gain within 2 weeks. Hospitalized for CHF exacerbation and A.fib treatment.    # CHF exacerbation  # h/o HFpEF (last EF 65% on 2016)  Her elevated BNP 4581, edema, SOB, and her recent h/o increased PO fluid intake (recent UTI), elevated JVD to below her ear, all suggests CHF exacerbation. Most likely due to a combination of increased fluid intake for recent UTI and increased salty food at her independent residence. Per  report, she has been compliant with her home medications.  - Lasix 40 mg IV 1x(goal net: -1.5-2 L), reassess tomorrow  - Hold hydrochlorothiazide (in setting of starting lasix and hyponatremia)  - Continue Losartan 50 mg PO BID  - Fluid restriction 1900 mL fluid  - Daily weights  - Strict I/Os  - ECHO   - Trend BMP    # A. Fib (CHADSVASC 5: age, HTN, CHF exacerbation, female)  Pt with h/o AF regractory to DCCVx2 (6/2018, 9/2018). Pt was placed on Amiodarone 200 mg daily 2.5 weeks ago, and has been on apixaban and metoprolol at home. Most likely triggered by CHF exacerbation/volume overloaded status. Pacemaker interrogation showed 84% V paced, %, and Vrate of 60. Pt symptoms include: dizziness, dyspnea, fatigue. No neurological complications seen on physical exam.   - Apixaban 2.5 mg PO BID  - Metoprolol 25 mg PO daily  - Amiodarone 200 mg PO BID  - Cardioversion tomorrow (NPO at midnight), after improved volume status  - Telemetry    # Hyponatremia  Most likely due to volume overload from CHF exacerbation and being on hydrochlorothiazide  - Hold hydrochlorothiazide  - Trend    Chronic problems:  # HTN  - Start Amlodipine 2.5 mg PO daily  - Continue losartan, metoprolol     # GERD  - Continue ranitidine 150 mg PO daily    # Hypothyroidism  - Continue home levothyroxine 75 mcg PO daily    # Depression  - Mirtazapine 15 mg PO     FEN: 1800 mL fluid restriction  Diet: NPO for cardioversion 10/9  Disposition: pending diuresis, cardioversion, 1-2 days inpatient.     The above was discussed with Dr. Littlejohn.

## 2018-10-08 NOTE — IP AVS SNAPSHOT
[address] [address]    Phone:  [phone]                                       After Visit Summary   10/8/2018    Linda Spicer    MRN: [id number]           After Visit Summary Signature Page     I have received my discharge instructions, and my questions have been answered. I have discussed any challenges I see with this plan with the nurse or doctor.    ..........................................................................................................................................  Patient/Patient Representative Signature      ..........................................................................................................................................  Patient Representative Print Name and Relationship to Patient    ..................................................               ................................................  Date                                   Time    ..........................................................................................................................................  Reviewed by Signature/Title    ...................................................              ..............................................  Date                                               Time          22EPIC Rev 08/18

## 2018-10-08 NOTE — MR AVS SNAPSHOT
After Visit Summary   10/8/2018    Linda Spicer    MRN: [id number]           Patient Information     Date Of Birth          6/13/1931        Visit Information        Provider Department      10/8/2018 9:15 AM Minna Gallagher APRN CNP [facility]        Today's Diagnoses     Persistent atrial fibrillation (H)    -  1    Dizziness           Follow-ups after your visit        Your next 10 appointments already scheduled     Oct 11, 2018  9:00 AM CDT   Level 1 with Delano 7 INFUSION   [facility] ([facility])    [id number] [place] [address]   [phone]            Nov 08, 2018  9:30 AM CST   Level 1 with Delano 9 [facility] ([facility])    [address] [place] [address]   [phone]            Dec 11, 2018 10:00 AM CST   Office Visit with PFT LAB   [facility] ([facility])    [address] [place] [address]   [phone]           Bring a current list of meds and any records pertaining to this visit. For Physicals, please bring immunization records and any forms needing to be filled out. Please arrive 10 minutes early to complete paperwork.            Dec 11, 2018 11:00 AM CST   Return Visit with Dea Acosta MD   [facility] ([facility])    [address] [place] [address]   [phone]            Dec 19, 2018 11:00 AM CST   Lab with  LAB   [facility] ([facility])    [address] [place] [address]  [address] [address] [phone]            Dec 19, 2018 11:30 AM CST   Pacemaker Check with  Cv Device 1   [facility] ([facility] [place])    [address] [place] [address]  [phone]               Dec 19, 2018 12:00 PM CST   PFT VISIT with  PFL B   [facility] Pulmonary Function Testing ([facility])    [address]  3rd Floor  [facility] [address] [phone]            Dec 19, 2018  1:00 PM CST   (Arrive by 12:45 PM)   RETURN ARRHYTHMIA with Franki Carrasquillo MD   [facility] ([facility])    [address]  [address]  [facility] [address] [phone]            Jim 15, 2019  2:40 PM CST   Return Visit with Mario Davenport MD   [facility] ([facility])    [address] [facility] [address] [facility] [id number] [phone]            Feb 18, 2019  2:10 PM CST   Return Visit with Em[facility]io Loza MD   [facility]    [address] [place] [id number] [phone]              Future tests that were ordered for you today     Open Standing Orders        Priority Remaining Interval Expires Ordered    NPO per Anesthesia Guidelines for Procedure/Surgery Except for: Meds Routine 1/1 DIET EFFECTIVE MIDNIGHT  10/8/2018    CBC with platelets Routine 1/1 AM DRAW  10/8/2018    Basic metabolic panel Routine 1/1 AM DRAW  10/8/2018    EKG 12-lead, tracing only STAT 100/100 CONDITIONAL (SPECIFY)  10/8/2018    Oxygen: Nasal cannula Routine 23727/03881 PRN  10/8/2018    EKG 12-lead, tracing only Routine 1/1 CONDITIONAL X 1  10/8/2018    Smoking Cessation Program IP Consult Routine 1/1 CONDITIONAL X 1  10/8/2018            Who to contact     If you have questions or need follow up information about today's clinic visit or your schedule please contact [facility] directly at [phone].  Normal or non-critical lab and imaging results will be communicated to you by MyChart, letter or phone within 4 business days after the clinic has received the results. If you do not hear from us within 7 days, please contact the clinic through MyChart or phone. If you have a critical or abnormal lab result, we will notify you by phone as soon as possible.  Submit  "refill requests through [employer] or call your pharmacy and they will forward the refill request to us. Please allow 3 business days for your refill to be completed.          Additional Information About Your Visit        Portfoliumhart Information     [employer] gives you secure access to your electronic health record. If you see a primary care provider, you can also send messages to your care team and make appointments. If you have questions, please call your primary care clinic.  If you do not have a primary care provider, please call [phone] and they will assist you.        Care EveryWhere ID     This is your Care EveryWhere ID. This could be used by other organizations to access your [place] medical records  [id number]        Your Vitals Were     Pulse Height Last Period BMI (Body Mass Index)          61 1.613 m (5' 3.5\") (LMP Unknown) 28.86 kg/m2         Blood Pressure from Last 3 Encounters:   10/08/18 180/87   10/08/18 149/64   09/19/18 128/82    Weight from Last 3 Encounters:   10/08/18 75.1 kg (165 lb 8 oz)   09/19/18 70.8 kg (156 lb)   09/17/18 72.1 kg (159 lb)              Today, you had the following     No orders found for display       Primary Care Provider Office Phone # Fax #    Tre Lockett [phone]       [address]  [facility] [address]        Goals        General    #1 I will complete my health care directive. (pt-stated)     Notes - Note edited  8/21/2018 10:39 AM by Behl, Melissa K, RN    Goal Statement: I will complete my health care directive.  Measure of Success: Health care directive will be completed and scanned into chart.  Supportive Steps to Achieve: Patient has attended a health care directive class, 10/24/17 informed of CPR vs intubation  Barriers: is awaiting to see her  to finalize document  Strengths: has care coordination, home care and excellent family support  Date to Achieve By: 12/31/18  Patient expressed understanding of goal: yes   "             Equal Access to Services     [facility]: Hadii aad ku danielo Sojordin, waaxda luqadaha, qaybta kagrace kimberlyryannefidencio, waxcameron marisabel quezadairmaaddy moss . So [facility] [phone].    ATENCIÓN: Si habla jaskaran, tiene a merchant disposición servicios gratuitos de asistencia lingüística. Arsename al [phone].    We comply with applicable federal civil rights laws and [place] laws. We do not discriminate on the basis of race, color, national origin, age, disability, sex, sexual orientation, or gender identity.            Thank you!     Thank you for choosing [facility]  for your care. Our goal is always to provide you with excellent care. Hearing back from our patients is one way we can continue to improve our services. Please take a few minutes to complete the written survey that you may receive in the mail after your visit with us. Thank you!             Your Updated Medication List - Protect others around you: Learn how to safely use, store and throw away your medicines at www.disposemymeds.org.          This list is accurate as of 10/8/18 12:58 PM.  Always use your most recent med list.                   Brand Name Dispense Instructions for use Diagnosis    ACETAMINOPHEN PO      Take 1,000 mg by mouth 2 times daily as needed        albuterol (2.5 MG/3ML) 0.083% neb solution      Take 1 vial by nebulization every 6 hours as needed for shortness of breath / dyspnea or wheezing        amiodarone 200 MG tablet    PACERONE/CODARONE    120 tablet    1st week: 400mg twice a day. 2nd week: 200mg twice a day. Then 200mg daily    Paroxysmal atrial fibrillation (H)       apixaban ANTICOAGULANT 2.5 MG tablet    ELIQUIS    180 tablet    Take 1 tablet (2.5 mg) by mouth 2 times daily    Atrial flutter, paroxysmal (H)       azaTHIOprine 50 MG tablet    IMURAN    90 tablet    Take 1 tablet (50 mg) by mouth daily    Rheumatoid arthritis involving multiple sites with positive rheumatoid factor (H)       Blood  Pressure Monitor Kit     1 kit    1 kit daily as needed    CHF (congestive heart failure) (H)       calcium carbonate 600 mg-vitamin D 400 units 600-400 MG-UNIT per tablet    CALTRATE     Take 1 tablet by mouth 2 times daily        COMPOUNDED NON-CONTROLLED SUBSTANCE - PHARMACY TO MIX COMPOUNDED MEDICATION    CMPD RX    30 g    Estriol 1mg/gram. Place 1 gram vaginally daily for two weeks, then vaginally twice weekly after.    Atrophic vaginitis       fish oil-omega-3 fatty acids 1000 MG capsule      Take 2 g by mouth daily. 2 capsules daily        folic acid 1 MG tablet    FOLVITE    90 tablet    Take 1 tablet (1 mg) by mouth daily    Oral aphthae       GENTEAL MILD OP      Apply  to eye daily.        hydrochlorothiazide 25 MG tablet    HYDRODIURIL    30 tablet    Take 1 tablet (25 mg) by mouth daily    Hypertension goal BP (blood pressure) < 150/90       hydrocortisone 2.5 % cream     30 g    Apply BID to affected region(s) for 7-10 days.    Rash       levothyroxine 75 MCG tablet    SYNTHROID/LEVOTHROID    90 tablet    TAKE ONE TABLET BY MOUTH ONCE DAILY    Hypothyroidism, unspecified type       loratadine 10 MG tablet    CLARITIN     Take 10 mg by mouth every morning        losartan 50 MG tablet    COZAAR    180 tablet    Take 1 tablet (50 mg) by mouth 2 times daily    Heart failure with preserved ejection fraction, NYHA class I (H), Hypertension goal BP (blood pressure) < 130/80       metoprolol succinate 25 MG 24 hr tablet    TOPROL-XL    90 tablet    Take 1 tablet (25 mg) by mouth daily    Hypertension goal BP (blood pressure) < 140/90       mirtazapine 15 MG tablet    REMERON    30 tablet    Take 1 tablet (15 mg) by mouth At Bedtime    Adjustment insomnia       nitroGLYcerin 0.4 MG sublingual tablet    NITROSTAT    25 tablet    Place 1 tablet (0.4 mg) under the tongue every 5 minutes as needed for chest pain    Chest pain       * order for DME     1 Box    Equipment being ordered: Dispense face mask. Mrs.  Dannielle is immunosuppressed due to rheumatoid arthritis.    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 Use with Albuterol.    Hypoxia       order for DME     1 each    Equipment being ordered: Dispense baffle, for use with nebulizer.    Pneumonia of left upper lobe due to Mycoplasma pneumoniae       * order for DME     1 each    Dispense SP Walker-small    Pain of toe of right foot, Status post bunionectomy       PRESERVISION AREDS PO      Take 1 tablet by mouth daily        ranibizumab 0.3 MG/0.05ML Soln    LUCENTIS     0.3 mg by Intravitreal route Every 6 weeks        ranitidine 150 MG tablet    ZANTAC    60 tablet    Take 1 tablet (150 mg) by mouth 2 times daily    Gastroesophageal reflux disease without esophagitis       REFRESH P.M. Oint      Apply  to eye. Daily at bedtime        saccharomyces boulardii 250 MG capsule    FLORASTOR     Take 250 mg by mouth daily        senna-docusate 8.6-50 MG per tablet    SENOKOT-S;PERICOLACE    120 tablet    Take 2 tablets by mouth At Bedtime Hold if diarrhea occurs.    Constipation, chronic       STATIN NOT PRESCRIBED (INTENTIONAL)      Please choose reason not prescribed, below    Hyperlipidemia LDL goal <100       VITAMIN C PO      Take 500 mg by mouth daily        ZYRTEC ALLERGY 10 MG tablet   Generic drug:  cetirizine      Take 10 mg by mouth At Bedtime        * Notice:  This list has 3 medication(s) that are the same as other medications prescribed for you. Read the directions carefully, and ask your doctor or other care provider to review them with you.

## 2018-10-08 NOTE — PROGRESS NOTES
"    Heart Care - Clinical Cardiac Electrophysiology       HPI: Linda Spicer is an 87 year old female who presents with her daughter for follow up of PAF/AFL and dizziness.  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 She is highly symptomatic with PAF/AFL.  She had been on propafenone since 2017 with continued recurrences so it was discontinued. She has not been a good candidate for AF ablation due to her high risk of stroke or bleed r/t her advanced age. Multaq was going to be tried, but was cost prohibitive as it is not covered by her insurance. She has limited AAT options given variable renal function (prefer to avoid Sotalol or dofetilide), therefore, an amiodarone load was initiated 2.5 weeks ago on 9/19/2018.     Reviewed current interval:  - Echocardiogram at OSH 5/2016 showed normal EF 65%  - Current cardiac medications: amiodarone 200 mg daily, apixaban 2.5 mg twice daily, hydrochlorothiazide 25 mg daily, losartan 50 mg twice daily, metoprolol Xl 25 mg daily  - Device interrogation 10/5/2018: \"Medtronic, dual chamber pacemaker\" \"rhythm is AF-VS/ ~60 bpm. AF remains 100%. She is taking eliquis. Normal device function. AP= 0% and = 84%. No short V-V intervals recorded. Lead trends appear stable. Battery estimates 6.5 years to ZENAIDA.\"  - Device check today: Shows 100% AF, no other ectopy, no change in rate.   - Presenting EKG personally reviewed tracings: V-paced underlying is AF, Vent rate 61, CT interval NA ms, QRS duration 164 ms, QTc 479 ms.  - Orthostats today normal.    Current Interval history:   She become very dizzy last night when she was trying to prepare dinner.  She has had continuous dyspnea, dizziness, and fatigue since with feeling like she may pass out. She states that this feels much worse than her " normal AF symptoms which are fatigue and RUSSELL. Denies missing doses of medication. States that she has gained 10 lbs in the past week. Patient states that her pants are fitting much tighter around her waist. She was treated for a UTI last week. Denies chest pain or pressure, syncope, pedal edema, or claudication.  Denies easy bruising or bleeding, hematuria, hematochezia, and epistaxis. Denies visual disturbance, difficulty speaking, facial drooping, sudden confusion, or any new numbness or weakness.    Current Outpatient Prescriptions   Medication Sig Dispense Refill     ACETAMINOPHEN PO Take 1,000 mg by mouth 2 times daily as needed        albuterol (2.5 MG/3ML) 0.083% neb solution Take 1 vial by nebulization every 6 hours as needed for shortness of breath / dyspnea or wheezing       amiodarone (PACERONE/CODARONE) 200 MG tablet 1st week: 400mg twice a day. 2nd week: 200mg twice a day. Then 200mg daily 120 tablet 3     apixaban ANTICOAGULANT (ELIQUIS) 2.5 MG tablet Take 1 tablet (2.5 mg) by mouth 2 times daily 180 tablet 3     Artificial Tear Ointment (REFRESH P.M.) OINT Apply  to eye. Daily at bedtime          Ascorbic Acid (VITAMIN C PO) Take 500 mg by mouth daily        azaTHIOprine (IMURAN) 50 MG tablet Take 1 tablet (50 mg) by mouth daily 90 tablet 2     Blood Pressure Monitor KIT 1 kit daily as needed 1 kit 0     calcium-vitamin D (CALTRATE) 600-400 MG-UNIT per tablet Take 1 tablet by mouth 2 times daily        cetirizine (ZYRTEC ALLERGY) 10 MG tablet Take 10 mg by mouth At Bedtime       COMPOUNDED NON-CONTROLLED SUBSTANCE (CMPD RX) - PHARMACY TO MIX COMPOUNDED MEDICATION Estriol 1mg/gram. Place 1 gram vaginally daily for two weeks, then vaginally twice weekly after. 30 g 6     fish oil-omega-3 fatty acids (FISH OIL) 1000 MG capsule Take 2 g by mouth daily. 2 capsules daily            folic acid (FOLVITE) 1 MG tablet Take 1 tablet (1 mg) by mouth daily 90 tablet 3     hydrochlorothiazide (HYDRODIURIL) 25 MG  tablet Take 1 tablet (25 mg) by mouth daily 30 tablet 5     hydrocortisone 2.5 % cream Apply BID to affected region(s) for 7-10 days. 30 g 0     Hypromellose (GENTEAL MILD OP) Apply  to eye daily.       levothyroxine (SYNTHROID/LEVOTHROID) 75 MCG tablet TAKE ONE TABLET BY MOUTH ONCE DAILY 90 tablet 1     loratadine (CLARITIN) 10 MG tablet Take 10 mg by mouth every morning       losartan (COZAAR) 50 MG tablet Take 1 tablet (50 mg) by mouth 2 times daily 180 tablet 1     metoprolol succinate (TOPROL-XL) 25 MG 24 hr tablet Take 1 tablet (25 mg) by mouth daily 90 tablet 3     mirtazapine (REMERON) 15 MG tablet Take 1 tablet (15 mg) by mouth At Bedtime 30 tablet 1     Multiple Vitamins-Minerals (PRESERVISION AREDS PO) Take 1 tablet by mouth daily        nitroglycerin (NITROSTAT) 0.4 MG SL tablet Place 1 tablet (0.4 mg) under the tongue every 5 minutes as needed for chest pain 25 tablet 0     order for DME Dispense SP Walker-small 1 each 0     order for DME Equipment being ordered: Dispense baffle, for use with nebulizer. 1 each 0     order for DME Equipment being ordered: Nebulizer. Use with Albuterol. 1 each 0     order for DME Equipment being ordered: Dispense face mask.  Mrs. Spicer is immunosuppressed due to rheumatoid arthritis. 1 Box 11     ranibizumab (LUCENTIS) 0.3 MG/0.05ML SOLN 0.3 mg by Intravitreal route Every 6 weeks       ranitidine (ZANTAC) 150 MG tablet Take 1 tablet (150 mg) by mouth 2 times daily 60 tablet 5     saccharomyces boulardii (FLORASTOR) 250 MG capsule Take 250 mg by mouth daily       senna-docusate (SENOKOT-S;PERICOLACE) 8.6-50 MG per tablet Take 2 tablets by mouth At Bedtime Hold if diarrhea occurs. 120 tablet 11     STATIN NOT PRESCRIBED, INTENTIONAL, Please choose reason not prescribed, below         Past Medical History:   Diagnosis Date     Adjustment disorder with anxious mood 5/18/2015     Advanced directives, counseling/discussion 8/30/2012    Patient states has Advance Directive and  will bring in a copy to clinic. 8/30/2012   Tevin May  [facility] Medical Assistant \       Anemia 9/25/2015     Basal cell cancer      CHF (congestive heart failure) (H) 9/18/2014     CKD (chronic kidney disease) stage 3, GFR 30-59 ml/min 9/29/2015     DDD (degenerative disc disease), lumbar 9/25/2015     Diffuse idiopathic pulmonary fibrosis (H) 5/6/2013     Encounter for palliative care 5/18/2015     History of blood transfusion 9/29/2015     Hypertension goal BP (blood pressure) < 140/90 9/7/2012     Hypothyroid 9/7/2012     Irritable bowel syndrome 10/29/2013     Macular degeneration      Macular degeneration, 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     Nondisplaced spiral fracture of shaft of humerus      Osteoporosis 8/13/2013     [employer] Update utility     RA (rheumatoid arthritis) (H) 5/7/2013     Rheumatic fever      Shingles      Spinal stenosis of lumbar region with neurogenic claudication 9/14/2015       Past Surgical History:   Procedure Laterality Date     ANESTHESIA CARDIOVERSION N/A 9/14/2018    Procedure: ANESTHESIA CARDIOVERSION;;  Surgeon: GENERIC ANESTHESIA PROVIDER;  Location: [facility] OR     APPENDECTOMY       BIOPSY      hemorrhoidectomy     ENT SURGERY      tonsillectomy     GYN SURGERY      3 D & C's     HYSTERECTOMY, PAP NO LONGER INDICATED       LAMINECTOMY LUMBAR ONE LEVEL N/A 10/13/2015    Procedure: LAMINECTOMY LUMBAR ONE LEVEL;  Surgeon: Fransico Toussaint MD;  Location: [facility] OR       Family History   Problem Relation Age of Onset     Hypertension Mother      Psychotic Disorder Father      Diabetes Son      Diabetes Daughter      Blood Disease Daughter        Social History   Substance Use Topics     Smoking status: Never Smoker     Smokeless tobacco: Never Used     Alcohol use Yes      Comment: rare wine        Allergies   Allergen Reactions     Cephalexin Hcl Diarrhea     Gabapentin Other (See Comments)     Dizzsiness     Naproxen GI Disturbance     Perfume      Lactase Other (See Comments)     Macrobid  "[Nitrofurantoin Anhydrous]      Possibly related to lung disease      Seasonal Allergies      Sulfa Drugs      Throat swelling     Xanax [Alprazolam] Other (See Comments)     Dizziness      Ciprofloxacin Itching and Rash         ROS:   A complete review of systems was performed and is negative except as noted in the HPI.     Physical Examination:  Vitals: /64 (Patient Position: Standing)  Pulse 61  Ht 1.613 m (5' 3.5\")  Wt 75.1 kg (165 lb 8 oz)  LMP  (LMP Unknown)  BMI 28.86 kg/m2  BMI= Body mass index is 28.86 kg/(m^2).    GENERAL APPEARANCE: healthy, alert. Has mild respiratory distress  HEENT: no icterus, no xanthelasmas, normal pupil size and reaction, no cyanosis.  NECK: no asymmetry, no cervical bruits, JVD   RESPIRATORY: lungs clear to auscultation - wheezes noted  CARDIOVASCULAR: regular rhythm and no murmur, click or rub  GI:  no abdominal bruits, soft, non-tender  EXTREMITIES: no clubbing  NEURO: alert and oriented to person/place/time, normal speech, gait and affect  VASC: Radial and posterior tibialis pulses +2 and symmetric bilaterally. No cyanosis. 1+ pitting edema noted.    SKIN: no ecchymoses, no rashes    Assessment and recommendations:    # Dyspnea and Dizziness:  Since last night she has had continuous dyspnea, dizziness, and fatigue with feeling like she may pass out. She has gained 10 lbs in the past week. She is hypervolemic on exam and is in mild respiratory distress. EKG today shows V-paced underlying rhythm is atrial fibrillation. Device check today shows 100% AF, no ectopy change since check 10/5/2018, no change in rate. Will direct admit her to [facility] 1 for atrial fibrillation, work up for HF, and possible diuresis and/or DCCV.      Patient expresses understanding and agreement with the plan.    I appreciate the chance to help with Linda Spicer's care. Please let me know if you have any questions or concerns.    Minna MOELLER, CNP  "

## 2018-10-08 NOTE — MR AVS SNAPSHOT
After Visit Summary   10/8/2018    Linda Spicer    MRN: [id number]           Patient Information     Date Of Birth          6/13/1931        Visit Information        Provider Department      10/8/2018 12:30 PM UC CV DEVICE 2 [facility]        Today's Diagnoses     Sick sinus syndrome (H)    -  1    Paroxysmal atrial fibrillation (H)           Follow-ups after your visit        Your next 10 appointments already scheduled     Oct 11, 2018  9:00 AM CDT   Level 1 with [place] 7 INFUSION   [facility]    [address] [place] [address]   [phone]            Nov 08, 2018  9:30 AM CST   Level 1 with [place] 9 INFUSION   [facility]    [address] [place] [address]   [phone]            Dec 11, 2018 10:00 AM CST   Office Visit with PFT LAB   [facility]    [address] [place] [address]   [phone]           Bring a current list of meds and any records pertaining to this visit. For Physicals, please bring immunization records and any forms needing to be filled out. Please arrive 10 minutes early to complete paperwork.            Dec 11, 2018 11:00 AM CST   Return Visit with Dea Acosta MD   [facility]    [address] [place] [address]   [phone]            Dec 19, 2018 11:00 AM CST   Lab with  LAB   [facility] ([facility])    [address] [place] [address]  [facility] [address]   [phone]            Dec 19, 2018 11:30 AM CST   Pacemaker Check with  Cv Device 1   [facility] ([facility])    [address] [place] [address]  [phone]               Dec 19, 2018 12:00 PM CST   PFT VISIT with  PFL B   [facility] ([facility])    [address]  3rd [address]  [facility] [address] [phone]            Dec 19, 2018  1:00 PM CST   (Arrive by 12:45 PM)   RETURN ARRHYTHMIA with Franki Carrasquillo MD   [facility] ([facility])    [address]  [address]  [facility] [address] [phone]            Jim 15, 2019  2:40 PM CST   [facility]    [address] [facility] [id number] [phone]            Feb 18, 2019  2:10 PM CST   Return Visit with Emeterio Loza MD   [facility]    [address] [place] [id number] [phone]              Who to contact     If you have questions or need follow up information about today's clinic visit or your schedule please contact [facility] directly at [phone].  Normal or non-critical lab and imaging results will be communicated to you by Trustifihart, letter or phone within 4 business days after the clinic has received the results. If you do not hear from us within 7 days, please contact the clinic through [employer] or phone. If you have a critical or abnormal lab result, we will notify you by phone as soon as possible.  Submit refill requests through [employer] or call your pharmacy and they will forward the refill request to us. Please allow 3 business days for your refill to be completed.          Additional Information About Your Visit        One[employer] Information     [employer] gives you secure access to your electronic health record. If you see a primary care provider, you can also send messages to your care team and make appointments. If you have questions, please call your primary care clinic.  If you do not have a primary care provider, please call [phone] and they will assist you.        Care EveryWhere ID     This is your Care EveryWhere ID. This  could be used by other organizations to access your [place] medical records  [id number]        Your Vitals Were     Last Period                   (LMP Unknown)            Blood Pressure from Last 3 Encounters:   10/08/18 149/64   09/19/18 128/82   09/17/18 113/70    Weight from Last 3 Encounters:   10/08/18 75.1 kg (165 lb 8 oz)   09/19/18 70.8 kg (156 lb)   09/17/18 72.1 kg (159 lb)              We Performed the Following     (07268)PM DEVICE PROGRAMMING EVAL, DUAL LEAD PACER        Primary Care Provider Office Phone # Fax #    Tre Jenny Lockett [phone] [address]  [facility] [address]        Goals        General    #1 I will complete my health care directive. (pt-stated)     Notes - Note edited  8/21/2018 10:39 AM by Behl, Melissa K, RN    Goal Statement: I will complete my health care directive.  Measure of Success: Health care directive will be completed and scanned into chart.  Supportive Steps to Achieve: Patient has attended a health care directive class, 10/24/17 informed of CPR vs intubation  Barriers: is awaiting to see her  to finalize document  Strengths: has care coordination, home care and excellent family support  Date to Achieve By: 12/31/18  Patient expressed understanding of goal: yes               Equal Access to Services     [facility] AH: Hadii rakesh schuster hadasho Sojordin, waaxda luqadaha, qaybta kaalmada adeegyada, zahraa moss . So [facility] [phone].    ATENCIÓN: Si habla español, tiene a merchant disposición servicios gratuitos de asistencia lingüística. Llame al [phone].    We comply with applicable federal civil rights laws and [place] laws. We do not discriminate on the basis of race, color, national origin, age, disability, sex, sexual orientation, or gender identity.            Thank you!     Thank you for choosing [facility]  for your care. Our goal is always to provide you with excellent care. Hearing back  from our patients is one way we can continue to improve our services. Please take a few minutes to complete the written survey that you may receive in the mail after your visit with us. Thank you!             Your Updated Medication List - Protect others around you: Learn how to safely use, store and throw away your medicines at www.disposemymeds.org.          This list is accurate as of 10/8/18 12:49 PM.  Always use your most recent med list.                   Brand Name Dispense Instructions for use Diagnosis    ACETAMINOPHEN PO      Take 1,000 mg by mouth 2 times daily as needed        albuterol (2.5 MG/3ML) 0.083% neb solution      Take 1 vial by nebulization every 6 hours as needed for shortness of breath / dyspnea or wheezing        amiodarone 200 MG tablet    PACERONE/CODARONE    120 tablet    1st week: 400mg twice a day. 2nd week: 200mg twice a day. Then 200mg daily    Paroxysmal atrial fibrillation (H)       apixaban ANTICOAGULANT 2.5 MG tablet    ELIQUIS    180 tablet    Take 1 tablet (2.5 mg) by mouth 2 times daily    Atrial flutter, paroxysmal (H)       azaTHIOprine 50 MG tablet    IMURAN    90 tablet    Take 1 tablet (50 mg) by mouth daily    Rheumatoid arthritis involving multiple sites with positive rheumatoid factor (H)       Blood Pressure Monitor Kit     1 kit    1 kit daily as needed    CHF (congestive heart failure) (H)       calcium carbonate 600 mg-vitamin D 400 units 600-400 MG-UNIT per tablet    CALTRATE     Take 1 tablet by mouth 2 times daily        COMPOUNDED NON-CONTROLLED SUBSTANCE - PHARMACY TO MIX COMPOUNDED MEDICATION    CMPD RX    30 g    Estriol 1mg/gram. Place 1 gram vaginally daily for two weeks, then vaginally twice weekly after.    Atrophic vaginitis       fish oil-omega-3 fatty acids 1000 MG capsule      Take 2 g by mouth daily. 2 capsules daily        folic acid 1 MG tablet    FOLVITE    90 tablet    Take 1 tablet (1 mg) by mouth daily    Oral aphthae       GENTEAL MILD OP       Apply  to eye daily.        hydrochlorothiazide 25 MG tablet    HYDRODIURIL    30 tablet    Take 1 tablet (25 mg) by mouth daily    Hypertension goal BP (blood pressure) < 150/90       hydrocortisone 2.5 % cream     30 g    Apply BID to affected region(s) for 7-10 days.    Rash       levothyroxine 75 MCG tablet    SYNTHROID/LEVOTHROID    90 tablet    TAKE ONE TABLET BY MOUTH ONCE DAILY    Hypothyroidism, unspecified type       loratadine 10 MG tablet    CLARITIN     Take 10 mg by mouth every morning        losartan 50 MG tablet    COZAAR    180 tablet    Take 1 tablet (50 mg) by mouth 2 times daily    Heart failure with preserved ejection fraction, NYHA class I (H), Hypertension goal BP (blood pressure) < 130/80       metoprolol succinate 25 MG 24 hr tablet    TOPROL-XL    90 tablet    Take 1 tablet (25 mg) by mouth daily    Hypertension goal BP (blood pressure) < 140/90       mirtazapine 15 MG tablet    REMERON    30 tablet    Take 1 tablet (15 mg) by mouth At Bedtime    Adjustment insomnia       nitroGLYcerin 0.4 MG sublingual tablet    NITROSTAT    25 tablet    Place 1 tablet (0.4 mg) under the tongue every 5 minutes as needed for chest pain    Chest pain       * order for DME     1 Box    Equipment being ordered: Dispense face mask. Mrs. Spicer is immunosuppressed due to rheumatoid arthritis.    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 Use with Albuterol.    Hypoxia       order for DME     1 each    Equipment being ordered: Dispense baffle, for use with nebulizer.    Pneumonia of left upper lobe due to Mycoplasma pneumoniae       * order for DME     1 each    Dispense SP Walker-small    Pain of toe of right foot, Status post bunionectomy       PRESERVISION AREDS PO      Take 1 tablet by mouth daily        ranibizumab 0.3 MG/0.05ML Soln    LUCENTIS     0.3 mg by Intravitreal route Every 6 weeks         ranitidine 150 MG tablet    ZANTAC    60 tablet    Take 1 tablet (150 mg) by mouth 2 times daily    Gastroesophageal reflux disease without esophagitis       REFRESH P.M. Oint      Apply  to eye. Daily at bedtime        saccharomyces boulardii 250 MG capsule    FLORASTOR     Take 250 mg by mouth daily        senna-docusate 8.6-50 MG per tablet    SENOKOT-S;PERICOLACE    120 tablet    Take 2 tablets by mouth At Bedtime Hold if diarrhea occurs.    Constipation, chronic       STATIN NOT PRESCRIBED (INTENTIONAL)      Please choose reason not prescribed, below    Hyperlipidemia LDL goal <100       VITAMIN C PO      Take 500 mg by mouth daily        ZYRTEC ALLERGY 10 MG tablet   Generic drug:  cetirizine      Take 10 mg by mouth At Bedtime        * Notice:  This list has 3 medication(s) that are the same as other medications prescribed for you. Read the directions carefully, and ask your doctor or other care provider to review them with you.

## 2018-10-09 ENCOUNTER — APPOINTMENT (OUTPATIENT)
Dept: CARDIOLOGY | Facility: CLINIC | Age: 83
DRG: 291 | End: 2018-10-09
Attending: INTERNAL MEDICINE
Payer: MEDICARE

## 2018-10-09 ENCOUNTER — TELEPHONE (OUTPATIENT)
Dept: CARDIOLOGY | Facility: CLINIC | Age: 83
End: 2018-10-09

## 2018-10-09 ENCOUNTER — TELEPHONE (OUTPATIENT)
Dept: FAMILY MEDICINE | Facility: CLINIC | Age: 83
End: 2018-10-09

## 2018-10-09 ENCOUNTER — APPOINTMENT (OUTPATIENT)
Dept: PHYSICAL THERAPY | Facility: CLINIC | Age: 83
DRG: 291 | End: 2018-10-09
Attending: INTERNAL MEDICINE
Payer: MEDICARE

## 2018-10-09 DIAGNOSIS — Z53.9 DIAGNOSIS NOT YET DEFINED: Primary | ICD-10-CM

## 2018-10-09 LAB
ANION GAP SERPL CALCULATED.3IONS-SCNC: 11 MMOL/L (ref 3–14)
ANION GAP SERPL CALCULATED.3IONS-SCNC: 11 MMOL/L (ref 3–14)
ANION GAP SERPL CALCULATED.3IONS-SCNC: 6 MMOL/L (ref 3–14)
BUN SERPL-MCNC: 33 MG/DL (ref 7–30)
BUN SERPL-MCNC: 34 MG/DL (ref 7–30)
BUN SERPL-MCNC: 37 MG/DL (ref 7–30)
CALCIUM SERPL-MCNC: 9 MG/DL (ref 8.5–10.1)
CALCIUM SERPL-MCNC: 9.2 MG/DL (ref 8.5–10.1)
CALCIUM SERPL-MCNC: 9.6 MG/DL (ref 8.5–10.1)
CHLORIDE SERPL-SCNC: 94 MMOL/L (ref 94–109)
CHLORIDE SERPL-SCNC: 96 MMOL/L (ref 94–109)
CHLORIDE SERPL-SCNC: 97 MMOL/L (ref 94–109)
CO2 SERPL-SCNC: 23 MMOL/L (ref 20–32)
CO2 SERPL-SCNC: 28 MMOL/L (ref 20–32)
CO2 SERPL-SCNC: 32 MMOL/L (ref 20–32)
CREAT SERPL-MCNC: 1.14 MG/DL (ref 0.52–1.04)
CREAT SERPL-MCNC: 1.19 MG/DL (ref 0.52–1.04)
CREAT SERPL-MCNC: 1.46 MG/DL (ref 0.52–1.04)
ERYTHROCYTE [DISTWIDTH] IN BLOOD BY AUTOMATED COUNT: 14 % (ref 10–15)
GFR SERPL CREATININE-BSD FRML MDRD: 34 ML/MIN/1.7M2
GFR SERPL CREATININE-BSD FRML MDRD: 43 ML/MIN/1.7M2
GFR SERPL CREATININE-BSD FRML MDRD: 45 ML/MIN/1.7M2
GLUCOSE SERPL-MCNC: 116 MG/DL (ref 70–99)
GLUCOSE SERPL-MCNC: 142 MG/DL (ref 70–99)
GLUCOSE SERPL-MCNC: 78 MG/DL (ref 70–99)
HCT VFR BLD AUTO: 34.3 % (ref 35–47)
HGB BLD-MCNC: 11.5 G/DL (ref 11.7–15.7)
INTERPRETATION ECG - MUSE: NORMAL
MAGNESIUM SERPL-MCNC: 1.5 MG/DL (ref 1.6–2.3)
MAGNESIUM SERPL-MCNC: 1.8 MG/DL (ref 1.6–2.3)
MAGNESIUM SERPL-MCNC: 3.6 MG/DL (ref 1.6–2.3)
MCH RBC QN AUTO: 33.1 PG (ref 26.5–33)
MCHC RBC AUTO-ENTMCNC: 33.5 G/DL (ref 31.5–36.5)
MCV RBC AUTO: 99 FL (ref 78–100)
PLATELET # BLD AUTO: 219 10E9/L (ref 150–450)
POTASSIUM SERPL-SCNC: 3.8 MMOL/L (ref 3.4–5.3)
POTASSIUM SERPL-SCNC: 3.9 MMOL/L (ref 3.4–5.3)
POTASSIUM SERPL-SCNC: 4.1 MMOL/L (ref 3.4–5.3)
RBC # BLD AUTO: 3.47 10E12/L (ref 3.8–5.2)
SODIUM SERPL-SCNC: 131 MMOL/L (ref 133–144)
SODIUM SERPL-SCNC: 134 MMOL/L (ref 133–144)
SODIUM SERPL-SCNC: 134 MMOL/L (ref 133–144)
WBC # BLD AUTO: 6.5 10E9/L (ref 4–11)

## 2018-10-09 PROCEDURE — 80048 BASIC METABOLIC PNL TOTAL CA: CPT | Performed by: STUDENT IN AN ORGANIZED HEALTH CARE EDUCATION/TRAINING PROGRAM

## 2018-10-09 PROCEDURE — 97530 THERAPEUTIC ACTIVITIES: CPT | Mod: GP

## 2018-10-09 PROCEDURE — A9270 NON-COVERED ITEM OR SERVICE: HCPCS | Mod: GY | Performed by: STUDENT IN AN ORGANIZED HEALTH CARE EDUCATION/TRAINING PROGRAM

## 2018-10-09 PROCEDURE — 25000128 H RX IP 250 OP 636: Performed by: INTERNAL MEDICINE

## 2018-10-09 PROCEDURE — 36415 COLL VENOUS BLD VENIPUNCTURE: CPT | Performed by: STUDENT IN AN ORGANIZED HEALTH CARE EDUCATION/TRAINING PROGRAM

## 2018-10-09 PROCEDURE — 97116 GAIT TRAINING THERAPY: CPT | Mod: GP

## 2018-10-09 PROCEDURE — 99233 SBSQ HOSP IP/OBS HIGH 50: CPT | Mod: 25 | Performed by: INTERNAL MEDICINE

## 2018-10-09 PROCEDURE — 83735 ASSAY OF MAGNESIUM: CPT | Performed by: STUDENT IN AN ORGANIZED HEALTH CARE EDUCATION/TRAINING PROGRAM

## 2018-10-09 PROCEDURE — 85027 COMPLETE CBC AUTOMATED: CPT | Performed by: STUDENT IN AN ORGANIZED HEALTH CARE EDUCATION/TRAINING PROGRAM

## 2018-10-09 PROCEDURE — 40000193 ZZH STATISTIC PT WARD VISIT

## 2018-10-09 PROCEDURE — 80048 BASIC METABOLIC PNL TOTAL CA: CPT | Performed by: INTERNAL MEDICINE

## 2018-10-09 PROCEDURE — 25000131 ZZH RX MED GY IP 250 OP 636 PS 637: Mod: GY | Performed by: STUDENT IN AN ORGANIZED HEALTH CARE EDUCATION/TRAINING PROGRAM

## 2018-10-09 PROCEDURE — 40000264 ECHO COMPLETE WITH OPTISON

## 2018-10-09 PROCEDURE — A9270 NON-COVERED ITEM OR SERVICE: HCPCS | Mod: GY | Performed by: INTERNAL MEDICINE

## 2018-10-09 PROCEDURE — 83735 ASSAY OF MAGNESIUM: CPT | Performed by: INTERNAL MEDICINE

## 2018-10-09 PROCEDURE — 25000132 ZZH RX MED GY IP 250 OP 250 PS 637: Mod: GY | Performed by: INTERNAL MEDICINE

## 2018-10-09 PROCEDURE — 25500064 ZZH RX 255 OP 636: Performed by: INTERNAL MEDICINE

## 2018-10-09 PROCEDURE — 25000132 ZZH RX MED GY IP 250 OP 250 PS 637: Mod: GY | Performed by: STUDENT IN AN ORGANIZED HEALTH CARE EDUCATION/TRAINING PROGRAM

## 2018-10-09 PROCEDURE — 21400006 ZZH R&B CCU INTERMEDIATE UMMC

## 2018-10-09 PROCEDURE — 36415 COLL VENOUS BLD VENIPUNCTURE: CPT | Performed by: INTERNAL MEDICINE

## 2018-10-09 PROCEDURE — 93306 TTE W/DOPPLER COMPLETE: CPT | Mod: 26 | Performed by: INTERNAL MEDICINE

## 2018-10-09 PROCEDURE — 97161 PT EVAL LOW COMPLEX 20 MIN: CPT | Mod: GP

## 2018-10-09 PROCEDURE — G0179 MD RECERTIFICATION HHA PT: HCPCS | Performed by: INTERNAL MEDICINE

## 2018-10-09 RX ORDER — MAGNESIUM SULFATE HEPTAHYDRATE 40 MG/ML
4 INJECTION, SOLUTION INTRAVENOUS EVERY 4 HOURS PRN
Status: DISCONTINUED | OUTPATIENT
Start: 2018-10-09 | End: 2018-10-11

## 2018-10-09 RX ORDER — MAGNESIUM SULFATE HEPTAHYDRATE 40 MG/ML
4 INJECTION, SOLUTION INTRAVENOUS EVERY 4 HOURS PRN
Status: DISCONTINUED | OUTPATIENT
Start: 2018-10-09 | End: 2018-10-12 | Stop reason: HOSPADM

## 2018-10-09 RX ORDER — POTASSIUM CHLORIDE 750 MG/1
20-40 TABLET, EXTENDED RELEASE ORAL
Status: DISCONTINUED | OUTPATIENT
Start: 2018-10-09 | End: 2018-10-12 | Stop reason: HOSPADM

## 2018-10-09 RX ORDER — POTASSIUM CHLORIDE 29.8 MG/ML
20 INJECTION INTRAVENOUS
Status: DISCONTINUED | OUTPATIENT
Start: 2018-10-09 | End: 2018-10-12 | Stop reason: HOSPADM

## 2018-10-09 RX ORDER — POLYETHYLENE GLYCOL 3350 17 G/17G
17 POWDER, FOR SOLUTION ORAL 2 TIMES DAILY PRN
Status: DISCONTINUED | OUTPATIENT
Start: 2018-10-09 | End: 2018-10-12 | Stop reason: HOSPADM

## 2018-10-09 RX ORDER — FUROSEMIDE 10 MG/ML
40 INJECTION INTRAMUSCULAR; INTRAVENOUS ONCE
Status: DISCONTINUED | OUTPATIENT
Start: 2018-10-09 | End: 2018-10-09

## 2018-10-09 RX ORDER — POTASSIUM CHLORIDE 7.45 MG/ML
10 INJECTION INTRAVENOUS
Status: DISCONTINUED | OUTPATIENT
Start: 2018-10-09 | End: 2018-10-12 | Stop reason: HOSPADM

## 2018-10-09 RX ORDER — AMOXICILLIN 250 MG
1 CAPSULE ORAL 2 TIMES DAILY PRN
Status: DISCONTINUED | OUTPATIENT
Start: 2018-10-09 | End: 2018-10-12 | Stop reason: HOSPADM

## 2018-10-09 RX ORDER — POTASSIUM CL/LIDO/0.9 % NACL 10MEQ/0.1L
10 INTRAVENOUS SOLUTION, PIGGYBACK (ML) INTRAVENOUS
Status: DISCONTINUED | OUTPATIENT
Start: 2018-10-09 | End: 2018-10-09 | Stop reason: RX

## 2018-10-09 RX ORDER — BISACODYL 10 MG
10 SUPPOSITORY, RECTAL RECTAL DAILY PRN
Status: DISCONTINUED | OUTPATIENT
Start: 2018-10-09 | End: 2018-10-12 | Stop reason: HOSPADM

## 2018-10-09 RX ORDER — FUROSEMIDE 10 MG/ML
40 INJECTION INTRAMUSCULAR; INTRAVENOUS 2 TIMES DAILY
Status: DISCONTINUED | OUTPATIENT
Start: 2018-10-09 | End: 2018-10-10

## 2018-10-09 RX ORDER — FUROSEMIDE 10 MG/ML
40 INJECTION INTRAMUSCULAR; INTRAVENOUS 2 TIMES DAILY
Status: DISCONTINUED | OUTPATIENT
Start: 2018-10-09 | End: 2018-10-09

## 2018-10-09 RX ORDER — POTASSIUM CHLORIDE 1.5 G/1.58G
20-40 POWDER, FOR SOLUTION ORAL
Status: DISCONTINUED | OUTPATIENT
Start: 2018-10-09 | End: 2018-10-12 | Stop reason: HOSPADM

## 2018-10-09 RX ADMIN — HUMAN ALBUMIN MICROSPHERES AND PERFLUTREN 6 ML: 10; .22 INJECTION, SOLUTION INTRAVENOUS at 13:45

## 2018-10-09 RX ADMIN — METOPROLOL SUCCINATE 25 MG: 25 TABLET, EXTENDED RELEASE ORAL at 09:02

## 2018-10-09 RX ADMIN — CETIRIZINE HYDROCHLORIDE 10 MG: 10 TABLET, FILM COATED ORAL at 20:18

## 2018-10-09 RX ADMIN — AMIODARONE HYDROCHLORIDE 200 MG: 200 TABLET ORAL at 20:18

## 2018-10-09 RX ADMIN — MIRTAZAPINE 15 MG: 15 TABLET, FILM COATED ORAL at 20:18

## 2018-10-09 RX ADMIN — POTASSIUM CHLORIDE 20 MEQ: 750 TABLET, EXTENDED RELEASE ORAL at 10:17

## 2018-10-09 RX ADMIN — LOSARTAN POTASSIUM 50 MG: 50 TABLET, FILM COATED ORAL at 08:59

## 2018-10-09 RX ADMIN — MAGNESIUM SULFATE IN WATER 4 G: 40 INJECTION, SOLUTION INTRAVENOUS at 12:13

## 2018-10-09 RX ADMIN — APIXABAN 2.5 MG: 2.5 TABLET, FILM COATED ORAL at 20:18

## 2018-10-09 RX ADMIN — AMIODARONE HYDROCHLORIDE 200 MG: 200 TABLET ORAL at 08:58

## 2018-10-09 RX ADMIN — LEVOTHYROXINE SODIUM 75 MCG: 75 TABLET ORAL at 07:50

## 2018-10-09 RX ADMIN — AZATHIOPRINE 50 MG: 50 TABLET ORAL at 09:01

## 2018-10-09 RX ADMIN — FUROSEMIDE 40 MG: 10 INJECTION, SOLUTION INTRAVENOUS at 09:04

## 2018-10-09 RX ADMIN — Medication 1 TABLET: at 20:17

## 2018-10-09 RX ADMIN — OXYCODONE HYDROCHLORIDE AND ACETAMINOPHEN 500 MG: 500 TABLET ORAL at 08:58

## 2018-10-09 RX ADMIN — POTASSIUM CHLORIDE 20 MEQ: 750 TABLET, EXTENDED RELEASE ORAL at 15:11

## 2018-10-09 RX ADMIN — LOSARTAN POTASSIUM 50 MG: 50 TABLET, FILM COATED ORAL at 16:49

## 2018-10-09 RX ADMIN — APIXABAN 2.5 MG: 2.5 TABLET, FILM COATED ORAL at 08:59

## 2018-10-09 RX ADMIN — RANITIDINE 150 MG: 150 TABLET ORAL at 08:57

## 2018-10-09 RX ADMIN — FOLIC ACID 1 MG: 1 TABLET ORAL at 09:01

## 2018-10-09 RX ADMIN — SENNOSIDES AND DOCUSATE SODIUM 1 TABLET: 8.6; 5 TABLET ORAL at 20:18

## 2018-10-09 RX ADMIN — Medication 1 TABLET: at 08:59

## 2018-10-09 RX ADMIN — FUROSEMIDE 40 MG: 10 INJECTION, SOLUTION INTRAVENOUS at 16:48

## 2018-10-09 ASSESSMENT — ACTIVITIES OF DAILY LIVING (ADL)
ADLS_ACUITY_SCORE: 12
ADLS_ACUITY_SCORE: 12
ADLS_ACUITY_SCORE: 13
ADLS_ACUITY_SCORE: 12
ADLS_ACUITY_SCORE: 12
ADLS_ACUITY_SCORE: 13

## 2018-10-09 NOTE — PROGRESS NOTES
CARDIOLOGY I SERVICE NOTE  10/09/2018    INTERVAL HISTORY:    Patient stated that she still had SOB, but unchanged from baseline. Also mentioned that she typically has 1 BM every day, but has not gone since yesterday morning, asked for stool softeners. Denied dizziness, fatigue, chest pain, palpitations.     I/O: -1.9 L overnight  Wt: 155 lb this AM    PERTINENT LABS:  CMP  Recent Labs  Lab 10/09/18  0553 10/08/18  1353   * 130*   POTASSIUM 3.9 4.7   CHLORIDE 97 98   CO2 23 26   ANIONGAP 11 6   GLC 78 93   BUN 33* 34*   CR 1.19* 1.19*   GFRESTIMATED 43* 43*   GFRESTBLACK 52* 52*   FATOUMATA 9.0 8.7   MAG 1.5*  --      CBC  Recent Labs  Lab 10/09/18  0553 10/08/18  1353   WBC 6.5 6.2   RBC 3.47* 3.38*   HGB 11.5* 11.2*   HCT 34.3* 33.6*   MCV 99 99   MCH 33.1* 33.1*   MCHC 33.5 33.3   RDW 14.0 14.0    234     INR  Recent Labs  Lab 10/08/18  1353   INR 1.28*     PHYSICAL EXAM:  Temp:  [97.7  F (36.5  C)-98.8  F (37.1  C)] 98.8  F (37.1  C)  Heart Rate:  [59-97] 59  Resp:  [16-18] 18  BP: (124-166)/(56-81) 124/56  SpO2:  [97 %-98 %] 98 %  Gen: A and O x 3, NAD  CV:  RRR, nl S1/S2, no m/r/g, Neck veins distended to angle of jaw, 1+ LE edema bilaterally  Resp: Fine crackles in posterior left lower base  Abd: soft, NT, ND, +BS  Skin: No lesions on limited exam  Neuro: Alert, speech fluent. CN2-12 grossly intact. Normal strength and tone.     Impression/Plan:  Linda Spicer is a 87 year old female, with h/o HFpEF (EF 65% 5/2016), paroxysmal A.fib (CHADSVASC 5 on Apixiban) s/p DCCV (6/2018, 9/14/2018), sick sinus syndrome s/p pacemaker (2017), rheumatoid pulmonary fibrosis, CKD stage 3, HTN, hypothyroidism, IBS, RA, anxiety, presenting with one day of dizziness, fatigue, and dyspnea, and 10 lb weight gain within 2 weeks. Hospitalized for CHF exacerbation and A.fib treatment.     # Acute on Chronic CHF exacerbation  # h/o HFpEF (last EF 65% on 2016)  Her elevated BNP 4581, edema, SOB, and her recent h/o increased PO  fluid intake (recent UTI), elevated JVD to below her ear, all suggests CHF exacerbation. Most likely due to a combination of increased fluid intake for recent UTI and increased salty food at her independent residence. Per report, she has been compliant with her home medications.   - Lasix 40 mg IV BID (goal net: -1.5-2 L), reassess I/O tomorrow  - Hold hydrochlorothiazide (in setting of starting lasix and hyponatremia)  - Continue Losartan 50 mg PO BID  - Fluid restriction 1900 mL   - Daily weights  - Strict I/Os  - ECHO   - Trend BMP  - Connect with [facility]     # A. Fib (CHADSVASC 5: age, HTN, CHF exacerbation, female)  Pt with h/o AF refractory to DCCVx2 (6/2018, 9/2018). Pt was placed on Amiodarone 200 mg daily 2.5 weeks ago, and has been on apixaban and metoprolol at home. Most likely triggered by CHF exacerbation/volume overloaded status. Pacemaker interrogation showed 84% V paced, %, and Vrate of 60. Pt symptoms include: dizziness, dyspnea, fatigue. No neurological complications seen on physical exam. Discussed with Dr. Carrasquillo for repeat DCCV, will plan for this when patient is euvolemic.  - AC: Apixaban 2.5 mg PO BID  - Rate: Metoprolol 25 mg PO daily  - Rhythm: Amiodarone 200 mg PO BID  - Cardioversion possibly tomorrow (NPO at midnight), if euvolemic and in A. Fib   - Telemetry     # Hyponatremia  Most likely due to volume overload from CHF exacerbation, in addition to being on hydrochlorothiazide. Suspect improvement overtime.   - Hold hydrochlorothiazide  - Trend, BMP 2000    # Constipation  At home, she uses stool softeners. She has not gone since 10/8 AM, but typically has 1 BM/day. She is passing flatus. Soft belly on physical exam.  - Miralax PRN 17g PO BID     Chronic problems:  # HTN  - Continue Amlodipine 2.5 mg PO daily  - Continue losartan, metoprolol   - Stop HCTZ     # GERD  - Continue ranitidine 150 mg PO daily     # Hypothyroidism  - Continue home levothyroxine 75 mcg PO  daily     # Insomnia  - Mirtazapine 15 mg PO      FEN: 1800 mL fluid restriction, 2 g Na  PPX: apixaban  Diet: NPO at midnight for possible cardioversion 10/10  Disposition: pending diuresis,1-2 days inpatient.     The above was discussed with Dr. Littlejohn.    Susi Fernandez, MS4.

## 2018-10-09 NOTE — PROGRESS NOTES
Cardiology Progress Note  Linda Spicer MRN: [id number]  Age: 87 year old, : 1931  10/09/18               Subjective     No acute events overnight. This morning, patient reports persistent shortness of breath while talking on the phone or positional changes.           Objective     Vitals:  /70  Temp 97.7  F (36.5  C) (Oral)  Resp 18  LMP  (LMP Unknown)  SpO2 97%    General: elderly female in NAD  HEENT: MMM, neck veins distended to angle of jaw at 30 degrees  CV: regular, normal S1S2, 2/6 holosystolic murmur at the apex  Resp: CTAB with good inspiratory effort  Abd: soft, slightly distended but NT   Skin: warm and dry, no rash  Extremities: moving all 4 with no limitations, 1+ LE edema  Neuro: alert, interactive, grossly non-focal           Data:     Noted.         Medications     Medications    amiodarone  200 mg Oral BID     amLODIPine  2.5 mg Oral QPM     apixaban ANTICOAGULANT  2.5 mg Oral BID     ascorbic acid (VITAMIN C) tablet 500 mg  500 mg Oral Daily     azaTHIOprine  50 mg Oral Daily     calcium carbonate 600 mg-vitamin D 400 units  1 tablet Oral BID     cetirizine  10 mg Oral At Bedtime     folic acid  1 mg Oral Daily     levothyroxine  75 mcg Oral Daily     losartan  50 mg Oral BID     metoprolol succinate  25 mg Oral Daily     mirtazapine  15 mg Oral At Bedtime     ranitidine  150 mg Oral Daily     sodium chloride (PF)  3 mL Intracatheter Q8H       - MEDICATION INSTRUCTIONS -       - MEDICATION INSTRUCTIONS -             Changes Today:     - Possible DCCV today   - Lasix 40mg IV BID ordered   - PRN medications for constipation ordered         Assessment and Plan:     Linda Spicer is an 87 year old female with PMH of PAF/AFL (CHADSVASC 5) s/p DCCV (2018, 2018), HFpEF (last EF 65% 2016), tachy/lisa syndrome s/p PPM (2017), RA c/b pulmonary fibrosis, CKD, HTN, hypothyroidism, IBS, and anxiety who presents from clinic after presenting with  dizziness and palpitations coupled with 10lb weight gain.     #. Acute CHF exacerbation  #. HFpEF (last EF 65% 5/2016)  Patient presented with 10lb weight gain with noted increase in LE edema in setting of known HFpEF with medication change from bumex to hydrochlorothiazide approx 1 month ago. Exam noted for JVD to the angle of the jaw and LE edema. BNP elevated to 4581. All consistent with CHF exacerbation. Likely multifactorial 2/2 medication change (bumex-->HCTZ), dietary non compliance (increased salty foods at residence), and increased fluid intake (recent UTI).   - BB: continue toprol XL  - ACEi/ARB: continue losartan  - Volume status: hypervolemic; will dose 40mg lasix IV BID and assess I/O; home hydrochlorothiazide held given current lasix administration and hyponatremic  - Shon ant: not on PTA  - strict I/O; goal net negative 1.5-2L  - daily weights  - trend BMP  - fluid restriction     #. Atrial fibrillation/Atrial flutter w/ h/o RVR (CHADSVASC 5: age, female, HTN, HF exacerbation)  #. 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 Recent PPM interrogation with 84% V paced with 100% underlying AF. Would favor worsened AF is 2/2 increased volume status. Will diurese and reassess. Discussed case with Dr. Carrasquillo regarding possible repeat DCCV. Will plan for this when patient euvolemic.   - Rate control: continue toprol XL  - Rhythm control: continue amiodarone  - Anticoagulation: continue apixaban  - telemetry  - will need ongoing assessment once improvement in current HF exacerbation, but plan for likely DCCV given now has had amiodarone load once euvolemic     #. Hypervolemic hyponatremia  Likely volume accumulation in setting of CHF exacerbation  - trend with diuresis     Chronic Problems  #. HTN  - continue losartan, toprol  - stop hydrochlorothiazide  - start amlodipine at QHS     #. RA c/b pulmonary fibrosis  - continue azathioprine,  folate  - albuterol neb PRN     #. CKD III  Cr ~1.3-1.4 at baseline.   - Monitor in setting of diuresis     #. Hypothyroidism  - continue synthroid     #. GERD  -continue ranitidine     #. Insomnia  - continue remeron     #. Chronic macrocytic anemia  Likely 2/2 AZA;  -continue folate    FEN: 2gm Na; 1.8L fluid restriction  PPX: apixaban   CODE: Full Code  Disposition: discharge to home possibly in 2-3 days following optimization of heart failure.      Patient was discussed with staff attending, Dr. Littlejohn.    Cameron Pagan MD  Internal Medicine, PGY-2  Cardiology Service  Pager: [phone]

## 2018-10-09 NOTE — TELEPHONE ENCOUNTER
Per patient instructions :  EP Scheduling called the patient & daughter to schedule a Cardioversion. The number [phone] was left for the patient to return the call and schedule the procedure.    Lakeshia Wood  Peri Electrophysiology   [phone]

## 2018-10-09 NOTE — PROGRESS NOTES
Care Coordinator Progress Note    Admission Date/Time:  10/8/2018  Attending MD:  Talat Littlejohn  Data  Chart reviewed, discussed with interdisciplinary team.   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     Concerns with insurance coverage for discharge needs: None stated by pt.  Current Living Situation: Patient lives alone.   Support System: Supportive and Involved family.   Services Involved: [facility]  Transportation at Discharge: Pt's son, Arjun Spicer said that either he or his sister, Xi will provide pt with a ride home.   Transportation to Medical Appointments: family to provide.    - Name of caregiver: son and daughter  Barriers to Discharge: medical condition.     Coordination of Care and Referrals: Provided patient/family with options for resumption of home care.    Assessment  Physical Therapy is recommending home care; pt said that she is currently receiving home care from [facility] and she wants to resume their services.   Intervention:  Arrangements made with [facility] (Ph: [phone] Fax: [phone]) for RN eval post hospitalization, resume previous orders, assess vital signs, respiratory and cardiac status, activity tolerance, hydration, nutrition, med set up and management, heart failure education reinforcement. HHA for assist with ADL's. PT/OT eval and treat for deconditioning, strengthening, and endurance.    Plan  Anticipated Discharge Date:  TBD  Anticipated Discharge Plan:  Discharge to home with resumption of home care.   CC will continue to monitor pt's medical condition and progress toward discharge.   VICKI MITCHELL RN BSN  Care Coordinator Unit   [phone]

## 2018-10-09 NOTE — TELEPHONE ENCOUNTER
Reason for Call:  Other prescription    Detailed comments: Please sign [id number] for multiple medications.    Phone Number [facility] can be reached at: [phone]    Best Time: any    Can we leave a detailed message on this number? YES    Call taken on 10/9/2018 at 8:41 AM by Maribel Crockett

## 2018-10-09 NOTE — PROGRESS NOTES
10/09/18 1500   Quick Adds   Type of Visit Initial PT Evaluation   Living Environment   Lives With alone;other (see comments)  (pt's  recent passed away and is now living alone)   Living Arrangements apartment   Home Accessibility no concerns   Number of Stairs to Enter Home 0   Number of Stairs Within Home 0   Stair Railings at Home none   Transportation Available car;family or friend will provide   Living Environment Comment Pt lives alone after recent passing of .  Lives in IND apt.  Only assist is for cleaning once every other wk.  At baseline is only able to amb short apt distances.  Uses electric scooter to navigate longer distances. Family assists with running errands.     Self-Care   Usual Activity Tolerance moderate   Current Activity Tolerance fair   Regular Exercise no   Equipment Currently Used at Home raised toilet;shower chair;wheelchair, power;walker, rolling   Functional Level Prior   Ambulation 1-->assistive equipment   Transferring 1-->assistive equipment   Toileting 0-->independent   Bathing 0-->independent   Dressing 0-->independent   Eating 0-->independent   Communication 0-->understands/communicates without difficulty   Swallowing 0-->swallows foods/liquids without difficulty   Cognition 0 - no cognition issues reported   Fall history within last six months yes   Number of times patient has fallen within last six months 1   Which of the above functional risks had a recent onset or change? ambulation   Prior Functional Level Comment SOB with IND gait and transfers.    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   Precautions/Limitations fall precautions   Heart Disease Risk Factors Age;Medical history;Lack of physical activity   Cognitive Status Examination   Orientation orientation to person, place and time   Level of Consciousness alert   Follows Commands and Answers Questions 100% of the time   Personal Safety and Judgment intact   Memory intact   Posture    Posture Kyphosis;Protracted shoulders;Forward head position   Range of Motion (ROM)   ROM Comment WFL   Strength   Strength Comments WFL   Bed Mobility   Bed Mobility Comments IND with HOB elevated 60deg   Transfer Skills   Transfer Comments STS with CGAx1 and FWW   Gait   Gait Comments Ambulats 35ft x2 with FWW and CGAx1.  Very SOB.     General Therapy Interventions   Planned Therapy Interventions balance training;bed mobility training;gait training;motor coordination training;neuromuscular re-education;strengthening;transfer training;risk factor education;home program guidelines;progressive activity/exercise   Clinical Impression   Criteria for Skilled Therapeutic Intervention yes, treatment indicated   PT Diagnosis functional deconditioning   Influenced by the following impairments fatigue, SOB, weakness   Functional limitations due to impairments IND prolonged gait   Clinical Presentation Evolving/Changing   Clinical Presentation Rationale SOB primary limitation of mobility   Clinical Decision Making (Complexity) Low complexity   Therapy Frequency` 5 times/week   Predicted Duration of Therapy Intervention (days/wks) 10/17/18   Anticipated Discharge Disposition Home with Assist;Home with Home Therapy   Risk & Benefits of therapy have been explained Yes   Patient, Family & other staff in agreement with plan of care Yes   [facility] [employer] AM-PAC  "TM \"6 Clicks\"   2016, Trustees of [facility], under license to [employer].  All rights reserved.   6 Clicks Short Forms Basic Mobility Inpatient Short Form   [facility] AM-PAC  \"6 Clicks\" V.2 Basic Mobility Inpatient Short Form   1. Turning from your back to your side while in a flat bed without using bedrails? 4 - None   2. Moving from lying on your back to sitting on the side of a flat bed without using bedrails? 3 - A Little   3. Moving to and from a bed to a chair (including a wheelchair)? 3 - A Little   4. Standing up from a chair using your arms (e.g., wheelchair, or bedside chair)? 3 - A Little   5. To walk in hospital room? 3 - A Little   6. Climbing 3-5 steps with a railing? 2 - A Lot   Basic Mobility Raw Score (Score out of 24.Lower scores equate to lower levels of function) 18   Total Evaluation Time   Total Evaluation Time (Minutes) 10     "

## 2018-10-09 NOTE — TELEPHONE ENCOUNTER
Received form from Dr. Lockett from [facility], form is signed and faxed back to fax # [phone].  Elmo Jimenez,  For [facility]

## 2018-10-09 NOTE — PROGRESS NOTES
Neuro: A&Ox4.   Cardiac: Afebrile, VSS, Paced rhythm.   Respiratory: RA   GI/: Voiding spontaneously, diuresing with lasix. No BM this shift.   Diet/appetite: Tolerating NPO diet. Denies nausea   Activity: Up with SBA and walker     Pain: . Denies   Skin: No new deficits noted.  Lines:  PIV saline locked     Pt has been resting comfortably throughout night, will continue to monitor and follow plan of care.

## 2018-10-10 ENCOUNTER — APPOINTMENT (OUTPATIENT)
Dept: PHYSICAL THERAPY | Facility: CLINIC | Age: 83
DRG: 291 | End: 2018-10-10
Attending: INTERNAL MEDICINE
Payer: MEDICARE

## 2018-10-10 ENCOUNTER — PATIENT OUTREACH (OUTPATIENT)
Dept: CARE COORDINATION | Facility: CLINIC | Age: 83
End: 2018-10-10

## 2018-10-10 LAB
ANION GAP SERPL CALCULATED.3IONS-SCNC: 11 MMOL/L (ref 3–14)
BUN SERPL-MCNC: 52 MG/DL (ref 7–30)
BUN SERPL-MCNC: 56 MG/DL (ref 7–30)
CALCIUM SERPL-MCNC: 9.6 MG/DL (ref 8.5–10.1)
CALCIUM SERPL-MCNC: 9.9 MG/DL (ref 8.5–10.1)
CHLORIDE SERPL-SCNC: 95 MMOL/L (ref 94–109)
CHLORIDE SERPL-SCNC: 99 MMOL/L (ref 94–109)
CO2 SERPL-SCNC: 26 MMOL/L (ref 20–32)
CO2 SERPL-SCNC: 28 MMOL/L (ref 20–32)
CREAT SERPL-MCNC: 1.5 MG/DL (ref 0.52–1.04)
CREAT SERPL-MCNC: 1.69 MG/DL (ref 0.52–1.04)
GFR SERPL CREATININE-BSD FRML MDRD: 29 ML/MIN/1.7M2
GFR SERPL CREATININE-BSD FRML MDRD: 33 ML/MIN/1.7M2
GLUCOSE SERPL-MCNC: 133 MG/DL (ref 70–99)
GLUCOSE SERPL-MCNC: 96 MG/DL (ref 70–99)
MAGNESIUM SERPL-MCNC: 2.4 MG/DL (ref 1.6–2.3)
MAGNESIUM SERPL-MCNC: 2.6 MG/DL (ref 1.6–2.3)
POTASSIUM SERPL-SCNC: 4.2 MMOL/L (ref 3.4–5.3)
POTASSIUM SERPL-SCNC: 4.6 MMOL/L (ref 3.4–5.3)
SODIUM SERPL-SCNC: 133 MMOL/L (ref 133–144)
SODIUM SERPL-SCNC: 134 MMOL/L (ref 133–144)

## 2018-10-10 PROCEDURE — 25000132 ZZH RX MED GY IP 250 OP 250 PS 637: Mod: GY | Performed by: STUDENT IN AN ORGANIZED HEALTH CARE EDUCATION/TRAINING PROGRAM

## 2018-10-10 PROCEDURE — 97530 THERAPEUTIC ACTIVITIES: CPT | Mod: GP

## 2018-10-10 PROCEDURE — 25000132 ZZH RX MED GY IP 250 OP 250 PS 637: Mod: GY | Performed by: INTERNAL MEDICINE

## 2018-10-10 PROCEDURE — 21400006 ZZH R&B CCU INTERMEDIATE UMMC

## 2018-10-10 PROCEDURE — 83735 ASSAY OF MAGNESIUM: CPT | Performed by: INTERNAL MEDICINE

## 2018-10-10 PROCEDURE — 97110 THERAPEUTIC EXERCISES: CPT | Mod: GP

## 2018-10-10 PROCEDURE — 80048 BASIC METABOLIC PNL TOTAL CA: CPT | Performed by: INTERNAL MEDICINE

## 2018-10-10 PROCEDURE — 40000193 ZZH STATISTIC PT WARD VISIT

## 2018-10-10 PROCEDURE — 25000128 H RX IP 250 OP 636: Performed by: INTERNAL MEDICINE

## 2018-10-10 PROCEDURE — 99233 SBSQ HOSP IP/OBS HIGH 50: CPT | Mod: GC | Performed by: INTERNAL MEDICINE

## 2018-10-10 PROCEDURE — A9270 NON-COVERED ITEM OR SERVICE: HCPCS | Mod: GY | Performed by: INTERNAL MEDICINE

## 2018-10-10 PROCEDURE — 25000131 ZZH RX MED GY IP 250 OP 636 PS 637: Mod: GY | Performed by: STUDENT IN AN ORGANIZED HEALTH CARE EDUCATION/TRAINING PROGRAM

## 2018-10-10 PROCEDURE — 97116 GAIT TRAINING THERAPY: CPT | Mod: GP

## 2018-10-10 PROCEDURE — 36415 COLL VENOUS BLD VENIPUNCTURE: CPT | Performed by: INTERNAL MEDICINE

## 2018-10-10 PROCEDURE — A9270 NON-COVERED ITEM OR SERVICE: HCPCS | Mod: GY | Performed by: STUDENT IN AN ORGANIZED HEALTH CARE EDUCATION/TRAINING PROGRAM

## 2018-10-10 RX ORDER — FUROSEMIDE 10 MG/ML
60 INJECTION INTRAMUSCULAR; INTRAVENOUS ONCE
Status: COMPLETED | OUTPATIENT
Start: 2018-10-10 | End: 2018-10-10

## 2018-10-10 RX ORDER — VITS A,C,E/LUTEIN/MINERALS 300MCG-200
1 TABLET ORAL DAILY
Status: DISCONTINUED | OUTPATIENT
Start: 2018-10-10 | End: 2018-10-12 | Stop reason: HOSPADM

## 2018-10-10 RX ORDER — CHLORAL HYDRATE 500 MG
1 CAPSULE ORAL DAILY
Status: DISCONTINUED | OUTPATIENT
Start: 2018-10-10 | End: 2018-10-12 | Stop reason: HOSPADM

## 2018-10-10 RX ORDER — FUROSEMIDE 10 MG/ML
60 INJECTION INTRAMUSCULAR; INTRAVENOUS ONCE
Status: DISCONTINUED | OUTPATIENT
Start: 2018-10-10 | End: 2018-10-10

## 2018-10-10 RX ORDER — VITS A,C,E/LUTEIN/MINERALS 300MCG-200
1 TABLET ORAL DAILY
Status: DISCONTINUED | OUTPATIENT
Start: 2018-10-11 | End: 2018-10-10

## 2018-10-10 RX ORDER — CLOTRIMAZOLE 1 %
1 CREAM WITH APPLICATOR VAGINAL AT BEDTIME
Status: DISCONTINUED | OUTPATIENT
Start: 2018-10-10 | End: 2018-10-10

## 2018-10-10 RX ORDER — CLOTRIMAZOLE 1 %
1 CREAM WITH APPLICATOR VAGINAL DAILY
Status: DISCONTINUED | OUTPATIENT
Start: 2018-10-10 | End: 2018-10-12 | Stop reason: HOSPADM

## 2018-10-10 RX ORDER — CHLORAL HYDRATE 500 MG
1 CAPSULE ORAL DAILY
Status: DISCONTINUED | OUTPATIENT
Start: 2018-10-11 | End: 2018-10-10

## 2018-10-10 RX ADMIN — APIXABAN 2.5 MG: 2.5 TABLET, FILM COATED ORAL at 09:48

## 2018-10-10 RX ADMIN — OXYCODONE HYDROCHLORIDE AND ACETAMINOPHEN 500 MG: 500 TABLET ORAL at 09:41

## 2018-10-10 RX ADMIN — CETIRIZINE HYDROCHLORIDE 10 MG: 10 TABLET, FILM COATED ORAL at 20:14

## 2018-10-10 RX ADMIN — Medication 1 TABLET: at 09:50

## 2018-10-10 RX ADMIN — AMIODARONE HYDROCHLORIDE 200 MG: 200 TABLET ORAL at 20:13

## 2018-10-10 RX ADMIN — RANITIDINE 150 MG: 150 TABLET ORAL at 09:51

## 2018-10-10 RX ADMIN — AMIODARONE HYDROCHLORIDE 200 MG: 200 TABLET ORAL at 09:38

## 2018-10-10 RX ADMIN — CLOTRIMAZOLE 1 APPLICATORFUL: 1 CREAM VAGINAL at 12:40

## 2018-10-10 RX ADMIN — FOLIC ACID 1 MG: 1 TABLET ORAL at 09:51

## 2018-10-10 RX ADMIN — APIXABAN 2.5 MG: 2.5 TABLET, FILM COATED ORAL at 20:14

## 2018-10-10 RX ADMIN — MIRTAZAPINE 15 MG: 15 TABLET, FILM COATED ORAL at 20:13

## 2018-10-10 RX ADMIN — METOPROLOL SUCCINATE 25 MG: 25 TABLET, EXTENDED RELEASE ORAL at 09:51

## 2018-10-10 RX ADMIN — FUROSEMIDE 60 MG: 10 INJECTION, SOLUTION INTRAVENOUS at 10:18

## 2018-10-10 RX ADMIN — Medication 1 TABLET: at 20:14

## 2018-10-10 RX ADMIN — SENNOSIDES AND DOCUSATE SODIUM 1 TABLET: 8.6; 5 TABLET ORAL at 09:57

## 2018-10-10 RX ADMIN — AZATHIOPRINE 50 MG: 50 TABLET ORAL at 09:41

## 2018-10-10 RX ADMIN — LEVOTHYROXINE SODIUM 75 MCG: 75 TABLET ORAL at 07:56

## 2018-10-10 ASSESSMENT — ACTIVITIES OF DAILY LIVING (ADL)
ADLS_ACUITY_SCORE: 13

## 2018-10-10 NOTE — PROGRESS NOTES
Cardiology Progress Note  Linda Spicer MRN: [id number]  Age: 87 year old, : 1931  10/09/18               Subjective     No acute events overnight. This morning, patient reports feeling well. Is able to walk from bed to bathroom with walker. Continues to feel short of breath at rest. Denies any palpitations.           Objective     Vitals:  /58 (BP Location: Left arm)  Pulse 62  Temp 98.5  F (36.9  C) (Oral)  Resp 16  Wt 70.2 kg (154 lb 12.8 oz)  LMP  (LMP Unknown)  SpO2 98%  BMI 26.99 kg/m2       Intake/Output Summary (Last 24 hours) at 10/10/18 0713  Last data filed at 10/10/18 0150   Gross per 24 hour   Intake              730 ml   Output             1825 ml   Net            -1095 ml     Vitals:    10/09/18 0600 10/10/18 0146   Weight: 70.5 kg (155 lb 8 oz) 70.2 kg (154 lb 12.8 oz)       General: elderly female in NAD  HEENT: MMM  CV: regular, normal S1S2, 2/6 holosystolic murmur at the apex  Resp: CTAB with good inspiratory effort  Abd: soft, slightly distended but NT   Skin: warm and dry, no rash  Extremities: moving all 4 with no limitations, trace LE edema  Neuro: alert, interactive, grossly non-focal           Data:     Noted.         Medications     Medications    amiodarone  200 mg Oral BID     apixaban ANTICOAGULANT  2.5 mg Oral BID     ascorbic acid (VITAMIN C) tablet 500 mg  500 mg Oral Daily     azaTHIOprine  50 mg Oral Daily     calcium carbonate 600 mg-vitamin D 400 units  1 tablet Oral BID     cetirizine  10 mg Oral At Bedtime     folic acid  1 mg Oral Daily     levothyroxine  75 mcg Oral Daily     losartan  50 mg Oral BID     metoprolol succinate  25 mg Oral Daily     mirtazapine  15 mg Oral At Bedtime     ranitidine  150 mg Oral Daily     sodium chloride (PF)  20 mL Intracatheter Once     sodium chloride (PF)  3 mL Intracatheter Q8H       - MEDICATION INSTRUCTIONS -       - MEDICATION INSTRUCTIONS -             Changes Today:     - Held losartan  in setting of NEIL   - Increased lasix to 60mg IV this AM    - PRN medications for constipation ordered   - NPO after midnight for possible cardioversion tomorrow         Assessment and Plan:     Linda Spicer is an 87 year old female with PMH of PAF/AFL (CHADSVASC 5) s/p DCCV (6/2018, 9/2018), HFpEF (last EF 65% 5/2016), 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     #. Acute CHF exacerbation  #. HFpEF (last EF 65% 5/2016)  Patient presented with 10lb weight gain with noted increase in LE edema in setting of known HFpEF with medication change from bumex to hydrochlorothiazide approx 1 month ago. Exam noted for JVD to the angle of the jaw and LE edema. BNP elevated to 4581. All consistent with CHF exacerbation. Likely multifactorial 2/2 medication change (bumex-->HCTZ), dietary non compliance (increased salty foods at residence), and increased fluid intake (recent UTI).   - BB: continue toprol XL  - ACEi/ARB: continue losartan  - Volume status: euvolemic; lasix 60mg IV once today and assess I/O; home hydrochlorothiazide held given hyponatremic and current lasix use   - Shon ant: not on PTA  - strict I/O; goal net negative 1.5-2L  - daily weights  - trend BMP  - fluid restriction     #. Atrial fibrillation/Atrial flutter w/ h/o RVR (CHADSVASC 5: age, female, HTN, HF exacerbation)  #. 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 Recent PPM interrogation with 84% V paced with 100% underlying AF. Would favor worsened AF is 2/2 increased volume status. Will diurese and reassess. Discussed case with Dr. Carrasquillo regarding possible repeat DCCV. Will plan for this when patient euvolemic.   - Rate control: continue toprol XL  - Rhythm control: continue amiodarone  - Anticoagulation: continue  apixaban  - telemetry  - will need ongoing assessment once improvement in current HF exacerbation, but plan for likely DCCV given now has had amiodarone load once euvolemic    #. Acute on chronic stage III kidney injury   Cr 1.5 from 1.1 yesterday. Likely in setting of hypovolemia secondary to diuresis.   - Hold nephrotoxic medications including losartan      #. Hypervolemic hyponatremia, resolved   Likely volume accumulation in setting of CHF exacerbation  - trend with diuresis     Chronic Problems  #. HTN  - continue losartan, toprol  - stop hydrochlorothiazide  - start amlodipine at QHS     #. RA c/b pulmonary fibrosis  - continue azathioprine, folate  - albuterol neb PRN     #. Hypothyroidism  - continue synthroid     #. GERD  -continue ranitidine     #. Insomnia  - continue remeron     #. Chronic macrocytic anemia  Likely 2/2 AZA;  -continue folate    FEN: 2gm Na; 1.8L fluid restriction; NPO after MN for possible cardioversion tomorrow   PPX: apixaban   CODE: Full Code  Disposition: discharge to home possibly in 2-3 days following optimization of heart failure.      Patient was discussed with staff attending, Dr. Littlejohn.    Cameron Pagan MD  Internal Medicine, PGY-2  Cardiology Service  Pager: [phone]

## 2018-10-10 NOTE — CONSULTS
"Linda is an 87 year old female presenting with diastolic heart failure. CORE consult requested. She is currently admitted with fluid overload/CHF exacerbation.    She was provided with a CHF book.  I reviewed the importance of daily weights, 2 gm sodium diet, 2L fluid restriction and compliance with medications upon hospital discharge.  CORE contact phone numbers provided and patient is encouraged to call with any questions or concerns, including any weight gain or loss of 2 or more pounds in 24 hours or 5 or more pounds in 1 week.      Appointment made in CORE clinic on 10/18 at 10am with Brook with labs prior.  I will follow-up with the patient at that time, sooner if needed.  Thank you for the consult.    Saloni Buitrgao RN BSN  Cardiology Care Coordinator - [facility]  Questions and [phone]  First press #1 for the [place] [facility] and then press #3 for \"Medical Advise\" to reach us Cardiology Nurses.   "

## 2018-10-10 NOTE — PROGRESS NOTES
"CLINICAL NUTRITION SERVICES    Per diet office, \" Pt cannot drink milk but tolerates most other dairy products. Would like lactose allergy.\"    INTERVENTIONS:  Implementation:  Modify composition of meals/snacks: Changed diet to a low lactose diet. Placed snack/supplement order to note the above in the event her diet order is changed.     Follow up/Monitoring:  Will continue to follow pt.    Nara Morris, MS, RD, LD, [facility]   6C Pgr: [phone]  "

## 2018-10-10 NOTE — PROGRESS NOTES
CARDIOLOGY I SERVICE NOTE  10/10/2018    INTERVAL HISTORY:    Patient stated that she still had SOB, but unchanged from baseline. Denied dizziness, fatigue, chest pain, palpitations. Complained of post-UTI irritation/itchiness between her labial folds, has been present prior to admission.    I/O: -1.8 L overnight  Wt: 154 lb (70.5 kg) this AM, 155 lb (70.5 kg) yesterday    PERTINENT LABS:  CMP    Recent Labs  Lab 10/10/18  0610 10/09/18  1809 10/09/18  1304 10/09/18  0553 10/08/18  1353    134 134 131* 130*   POTASSIUM 4.6 4.1 3.8 3.9 4.7   CHLORIDE 99 94 96 97 98   CO2 26 28 32 23 26   ANIONGAP  --  11 6 11 6   GLC 96 142* 116* 78 93   BUN 52* 37* 34* 33* 34*   CR 1.50* 1.46* 1.14* 1.19* 1.19*   GFRESTIMATED 33* 34* 45* 43* 43*   GFRESTBLACK 40* 41* 55* 52* 52*   FATOUMATA 9.6 9.6 9.2 9.0 8.7   MAG 2.6* 3.6* 1.8 1.5*  --      CBC    Recent Labs  Lab 10/09/18  0553 10/08/18  1353   WBC 6.5 6.2   RBC 3.47* 3.38*   HGB 11.5* 11.2*   HCT 34.3* 33.6*   MCV 99 99   MCH 33.1* 33.1*   MCHC 33.5 33.3   RDW 14.0 14.0    234     INR    Recent Labs  Lab 10/08/18  1353   INR 1.28*     PHYSICAL EXAM:  Temp:  [96.8  F (36  C)-98.8  F (37.1  C)] 98.4  F (36.9  C)  Pulse:  [62] 62  Heart Rate:  [59-68] 68  Resp:  [16-18] 16  BP: (107-145)/(52-71) 145/71  SpO2:  [97 %-98 %] 98 %  Gen: A and O x 3, NAD  CV:  RRR, nl S1/S2, no m/r/g, Neck veins near collarbone, trace LE edema bilaterally  Resp: Fine crackles in posterior left lower base  Abd: soft, NT, ND, +BS  Skin: No lesions on limited exam  Neuro: Alert, speech fluent. CN2-12 grossly intact. Normal strength and tone.     Impression/Plan:  Linda Spicer is a 87 year old female, with h/o HFpEF (EF 65% 5/2016), paroxysmal A.fib (CHADSVASC 5 on Apixiban) s/p DCCV (6/2018, 9/14/2018), sick sinus syndrome s/p pacemaker (2017), rheumatoid pulmonary fibrosis, CKD stage 3, HTN, hypothyroidism, IBS, RA, anxiety, presenting with one day of dizziness, fatigue, and dyspnea, and 10 lb  weight gain within 2 weeks. Hospitalized for CHF exacerbation and A.fib monitoring/treatment.     # Acute on Chronic CHF exacerbation  # h/o HFpEF (last EF 55-60% on 2018)  On admission, her elevated BNP 4581, edema, SOB, and her recent h/o increased PO fluid intake (recent UTI), elevated JVD to below her ear, all suggested CHF exacerbation. Most likely due to a combination of increased fluid intake for recent UTI and increased salty food at her independent residence. Per report, she has been compliant with her home medications. Now s/p 3x Lasix IV 40 mg+ 1x Lasix 60 mg this AM.  - Reassess I/O tomorrow, but stop Lasix (hypotensive and patient euvolemic)   - Hold hydrochlorothiazide (in setting of starting lasix and now resolved hyponatremia)  - hold Losartan 50 mg PO BID (in setting of low BPs)  - Fluid restriction 1900 mL   - Daily weights  - Strict I/Os  - Trend BMP  - Connected with [facility] CHF clinic     # A. Fib (CHADSVASC 5: age, HTN, CHF exacerbation, female)  Pt with h/o AF refractory to DCCVx2 (6/2018, 9/2018). Pt was placed on Amiodarone 200 mg daily 2.5 weeks ago, and has been on apixaban and metoprolol at home. Most likely triggered by CHF exacerbation/volume overloaded status. Pacemaker interrogation showed 84% V paced, %, and Vrate of 60. Pt symptoms include: dizziness, dyspnea, fatigue. No neurological complications seen on physical exam. Pt continues to be in A.fib during admission, per telemetry. Discussed with Dr. Carrasquillo for repeat DCCV, will plan for this when patient is euvolemic.  - AC: Apixaban 2.5 mg PO BID  - Rate: Metoprolol 25 mg PO daily  - Rhythm: Amiodarone 200 mg PO BID  - Cardioversion possibly tomorrow (NPO at midnight), if euvolemic and in A. Fib   - Telemetry     # Hyponatremia-resolved-   On admission Na 130. Na today 134. Most likely due to volume overload from CHF exacerbation, in addition to being on hydrochlorothiazide.   - Hold hydrochlorothiazide    # Constipation  At  home, she uses stool softeners. She has not gone since 10/8 AM, but typically has 1 BM/day. She is passing flatus. Soft belly on physical exam.  - Miralax PRN 17g PO BID     Chronic problems:  # HTN  - Continue Amlodipine 2.5 mg PO daily  - Continue losartan, metoprolol   - Stop HCTZ     # GERD  - Continue ranitidine 150 mg PO daily     # Hypothyroidism  - Continue home levothyroxine 75 mcg PO daily     # Insomnia  - Mirtazapine 15 mg PO      FEN: 1800 mL fluid restriction, 2 g Na  PPX: apixaban  Diet: NPO at midnight for possible cardioversion 10/11  Disposition: pending cardioversion,1-2 days inpatient.     The above was discussed with Dr. Littlejohn.    Susi Fernandez, MS4.

## 2018-10-10 NOTE — PROGRESS NOTES
Clinic Care Coordination Contact    Situation: Patient chart reviewed by care coordinator.    Background: RN CC reviewing chart for follow up.    Assessment: Patient is inpatient at [facility].    Plan/Recommendations: RN CC will monitor for patient's disposition.    Melissa Behl BSN, RN, N  [facility] Care Coordinator  [phone]

## 2018-10-11 ENCOUNTER — APPOINTMENT (OUTPATIENT)
Dept: CARDIOLOGY | Facility: CLINIC | Age: 83
DRG: 291 | End: 2018-10-11
Attending: INTERNAL MEDICINE
Payer: MEDICARE

## 2018-10-11 ENCOUNTER — APPOINTMENT (OUTPATIENT)
Dept: PHYSICAL THERAPY | Facility: CLINIC | Age: 83
DRG: 291 | End: 2018-10-11
Attending: INTERNAL MEDICINE
Payer: MEDICARE

## 2018-10-11 ENCOUNTER — ANESTHESIA EVENT (OUTPATIENT)
Dept: SURGERY | Facility: CLINIC | Age: 83
DRG: 291 | End: 2018-10-11
Payer: MEDICARE

## 2018-10-11 ENCOUNTER — ANESTHESIA (OUTPATIENT)
Dept: SURGERY | Facility: CLINIC | Age: 83
DRG: 291 | End: 2018-10-11
Payer: MEDICARE

## 2018-10-11 LAB
ALBUMIN UR-MCNC: 10 MG/DL
ANION GAP SERPL CALCULATED.3IONS-SCNC: 6 MMOL/L (ref 3–14)
ANION GAP SERPL CALCULATED.3IONS-SCNC: 9 MMOL/L (ref 3–14)
APPEARANCE UR: ABNORMAL
BACTERIA #/AREA URNS HPF: ABNORMAL /HPF
BILIRUB UR QL STRIP: NEGATIVE
BUN SERPL-MCNC: 59 MG/DL (ref 7–30)
BUN SERPL-MCNC: 68 MG/DL (ref 7–30)
CALCIUM SERPL-MCNC: 9 MG/DL (ref 8.5–10.1)
CALCIUM SERPL-MCNC: 9.6 MG/DL (ref 8.5–10.1)
CHLORIDE SERPL-SCNC: 98 MMOL/L (ref 94–109)
CHLORIDE SERPL-SCNC: 98 MMOL/L (ref 94–109)
CO2 SERPL-SCNC: 27 MMOL/L (ref 20–32)
CO2 SERPL-SCNC: 30 MMOL/L (ref 20–32)
COLOR UR AUTO: YELLOW
CREAT SERPL-MCNC: 1.51 MG/DL (ref 0.52–1.04)
CREAT SERPL-MCNC: 1.73 MG/DL (ref 0.52–1.04)
DIGOXIN SERPL-MCNC: <0.1 UG/L (ref 0.5–2)
GFR SERPL CREATININE-BSD FRML MDRD: 28 ML/MIN/1.7M2
GFR SERPL CREATININE-BSD FRML MDRD: 33 ML/MIN/1.7M2
GLUCOSE SERPL-MCNC: 105 MG/DL (ref 70–99)
GLUCOSE SERPL-MCNC: 89 MG/DL (ref 70–99)
GLUCOSE UR STRIP-MCNC: NEGATIVE MG/DL
HGB UR QL STRIP: ABNORMAL
INR PPP: 1.34 (ref 0.86–1.14)
KETONES UR STRIP-MCNC: NEGATIVE MG/DL
LEUKOCYTE ESTERASE UR QL STRIP: ABNORMAL
MAGNESIUM SERPL-MCNC: 1.9 MG/DL (ref 1.6–2.3)
MAGNESIUM SERPL-MCNC: 2 MG/DL (ref 1.6–2.3)
MAGNESIUM SERPL-MCNC: 2.1 MG/DL (ref 1.6–2.3)
NITRATE UR QL: POSITIVE
PH UR STRIP: 5 PH (ref 5–7)
POTASSIUM SERPL-SCNC: 4.1 MMOL/L (ref 3.4–5.3)
POTASSIUM SERPL-SCNC: 4.1 MMOL/L (ref 3.4–5.3)
POTASSIUM SERPL-SCNC: 4.3 MMOL/L (ref 3.4–5.3)
RBC #/AREA URNS AUTO: 5 /HPF (ref 0–2)
SODIUM SERPL-SCNC: 133 MMOL/L (ref 133–144)
SODIUM SERPL-SCNC: 134 MMOL/L (ref 133–144)
SOURCE: ABNORMAL
SP GR UR STRIP: 1.01 (ref 1–1.03)
SQUAMOUS #/AREA URNS AUTO: 8 /HPF (ref 0–1)
TRANS CELLS #/AREA URNS HPF: 1 /HPF (ref 0–1)
UROBILINOGEN UR STRIP-MCNC: NORMAL MG/DL (ref 0–2)
WBC #/AREA URNS AUTO: >182 /HPF (ref 0–5)
WBC CLUMPS #/AREA URNS HPF: PRESENT /HPF

## 2018-10-11 PROCEDURE — 36415 COLL VENOUS BLD VENIPUNCTURE: CPT | Performed by: INTERNAL MEDICINE

## 2018-10-11 PROCEDURE — 83735 ASSAY OF MAGNESIUM: CPT | Performed by: INTERNAL MEDICINE

## 2018-10-11 PROCEDURE — 92960 CARDIOVERSION ELECTRIC EXT: CPT

## 2018-10-11 PROCEDURE — 93005 ELECTROCARDIOGRAM TRACING: CPT

## 2018-10-11 PROCEDURE — 25000125 ZZHC RX 250: Performed by: INTERNAL MEDICINE

## 2018-10-11 PROCEDURE — 87086 URINE CULTURE/COLONY COUNT: CPT | Performed by: INTERNAL MEDICINE

## 2018-10-11 PROCEDURE — 97530 THERAPEUTIC ACTIVITIES: CPT | Mod: GP | Performed by: PHYSICAL THERAPIST

## 2018-10-11 PROCEDURE — 25000131 ZZH RX MED GY IP 250 OP 636 PS 637: Mod: GY | Performed by: STUDENT IN AN ORGANIZED HEALTH CARE EDUCATION/TRAINING PROGRAM

## 2018-10-11 PROCEDURE — 21400006 ZZH R&B CCU INTERMEDIATE UMMC

## 2018-10-11 PROCEDURE — 92960 CARDIOVERSION ELECTRIC EXT: CPT | Performed by: NURSE PRACTITIONER

## 2018-10-11 PROCEDURE — 25000132 ZZH RX MED GY IP 250 OP 250 PS 637: Mod: GY | Performed by: STUDENT IN AN ORGANIZED HEALTH CARE EDUCATION/TRAINING PROGRAM

## 2018-10-11 PROCEDURE — 97110 THERAPEUTIC EXERCISES: CPT | Mod: GP | Performed by: PHYSICAL THERAPIST

## 2018-10-11 PROCEDURE — 25000132 ZZH RX MED GY IP 250 OP 250 PS 637: Mod: GY | Performed by: INTERNAL MEDICINE

## 2018-10-11 PROCEDURE — 93010 ELECTROCARDIOGRAM REPORT: CPT | Performed by: INTERNAL MEDICINE

## 2018-10-11 PROCEDURE — 85610 PROTHROMBIN TIME: CPT | Performed by: INTERNAL MEDICINE

## 2018-10-11 PROCEDURE — 40000193 ZZH STATISTIC PT WARD VISIT: Performed by: PHYSICAL THERAPIST

## 2018-10-11 PROCEDURE — 37000008 ZZH ANESTHESIA TECHNICAL FEE, 1ST 30 MIN

## 2018-10-11 PROCEDURE — A9270 NON-COVERED ITEM OR SERVICE: HCPCS | Mod: GY | Performed by: STUDENT IN AN ORGANIZED HEALTH CARE EDUCATION/TRAINING PROGRAM

## 2018-10-11 PROCEDURE — 80162 ASSAY OF DIGOXIN TOTAL: CPT | Performed by: INTERNAL MEDICINE

## 2018-10-11 PROCEDURE — 97116 GAIT TRAINING THERAPY: CPT | Mod: GP | Performed by: PHYSICAL THERAPIST

## 2018-10-11 PROCEDURE — 87186 SC STD MICRODIL/AGAR DIL: CPT | Performed by: INTERNAL MEDICINE

## 2018-10-11 PROCEDURE — 25000128 H RX IP 250 OP 636: Performed by: INTERNAL MEDICINE

## 2018-10-11 PROCEDURE — 25000125 ZZHC RX 250: Performed by: NURSE ANESTHETIST, CERTIFIED REGISTERED

## 2018-10-11 PROCEDURE — 80048 BASIC METABOLIC PNL TOTAL CA: CPT | Performed by: INTERNAL MEDICINE

## 2018-10-11 PROCEDURE — A9270 NON-COVERED ITEM OR SERVICE: HCPCS | Mod: GY | Performed by: INTERNAL MEDICINE

## 2018-10-11 PROCEDURE — 84132 ASSAY OF SERUM POTASSIUM: CPT | Performed by: INTERNAL MEDICINE

## 2018-10-11 PROCEDURE — 81001 URINALYSIS AUTO W/SCOPE: CPT | Performed by: INTERNAL MEDICINE

## 2018-10-11 PROCEDURE — 5A2204Z RESTORATION OF CARDIAC RHYTHM, SINGLE: ICD-10-PCS | Performed by: INTERNAL MEDICINE

## 2018-10-11 PROCEDURE — 99232 SBSQ HOSP IP/OBS MODERATE 35: CPT | Mod: 25 | Performed by: INTERNAL MEDICINE

## 2018-10-11 PROCEDURE — 87088 URINE BACTERIA CULTURE: CPT | Performed by: INTERNAL MEDICINE

## 2018-10-11 RX ORDER — AMLODIPINE BESYLATE 5 MG/1
5 TABLET ORAL DAILY
Status: DISCONTINUED | OUTPATIENT
Start: 2018-10-11 | End: 2018-10-12 | Stop reason: HOSPADM

## 2018-10-11 RX ORDER — AMLODIPINE BESYLATE 5 MG/1
5 TABLET ORAL DAILY
Qty: 90 TABLET | Refills: 3 | Status: SHIPPED | OUTPATIENT
Start: 2018-10-12 | End: 2018-10-12

## 2018-10-11 RX ORDER — LIDOCAINE 40 MG/G
CREAM TOPICAL
Status: DISCONTINUED | OUTPATIENT
Start: 2018-10-11 | End: 2018-10-11

## 2018-10-11 RX ORDER — POTASSIUM CHLORIDE 750 MG/1
20 TABLET, EXTENDED RELEASE ORAL
Status: DISCONTINUED | OUTPATIENT
Start: 2018-10-11 | End: 2018-10-11

## 2018-10-11 RX ORDER — CLOTRIMAZOLE 1 %
1 CREAM WITH APPLICATOR VAGINAL DAILY
Qty: 50 G | Refills: 1 | Status: SHIPPED | OUTPATIENT
Start: 2018-10-12 | End: 2018-10-12

## 2018-10-11 RX ORDER — POTASSIUM CHLORIDE 750 MG/1
40 TABLET, EXTENDED RELEASE ORAL
Status: DISCONTINUED | OUTPATIENT
Start: 2018-10-11 | End: 2018-10-11

## 2018-10-11 RX ORDER — POTASSIUM CHLORIDE 750 MG/1
20 TABLET, EXTENDED RELEASE ORAL
Status: DISCONTINUED | OUTPATIENT
Start: 2018-10-11 | End: 2018-10-11 | Stop reason: HOSPADM

## 2018-10-11 RX ORDER — FUROSEMIDE 40 MG
40 TABLET ORAL DAILY
Qty: 60 TABLET | Refills: 3 | Status: SHIPPED | OUTPATIENT
Start: 2018-10-11 | End: 2018-10-12

## 2018-10-11 RX ORDER — POTASSIUM CHLORIDE 750 MG/1
40 TABLET, EXTENDED RELEASE ORAL
Status: DISCONTINUED | OUTPATIENT
Start: 2018-10-11 | End: 2018-10-11 | Stop reason: HOSPADM

## 2018-10-11 RX ADMIN — MIRTAZAPINE 15 MG: 15 TABLET, FILM COATED ORAL at 19:53

## 2018-10-11 RX ADMIN — AMIODARONE HYDROCHLORIDE 200 MG: 200 TABLET ORAL at 19:53

## 2018-10-11 RX ADMIN — METOPROLOL SUCCINATE 25 MG: 25 TABLET, EXTENDED RELEASE ORAL at 08:12

## 2018-10-11 RX ADMIN — AZATHIOPRINE 50 MG: 50 TABLET ORAL at 08:12

## 2018-10-11 RX ADMIN — CETIRIZINE HYDROCHLORIDE 10 MG: 10 TABLET, FILM COATED ORAL at 19:53

## 2018-10-11 RX ADMIN — Medication 40 MG: at 09:35

## 2018-10-11 RX ADMIN — Medication 1 TABLET: at 11:13

## 2018-10-11 RX ADMIN — APIXABAN 2.5 MG: 2.5 TABLET, FILM COATED ORAL at 08:12

## 2018-10-11 RX ADMIN — OMEGA-3 FATTY ACIDS CAP 1000 MG 1 G: 1000 CAP at 11:14

## 2018-10-11 RX ADMIN — AMLODIPINE BESYLATE 5 MG: 5 TABLET ORAL at 11:13

## 2018-10-11 RX ADMIN — RANITIDINE 150 MG: 150 TABLET ORAL at 08:13

## 2018-10-11 RX ADMIN — FOLIC ACID 1 MG: 1 TABLET ORAL at 11:14

## 2018-10-11 RX ADMIN — LEVOTHYROXINE SODIUM 75 MCG: 75 TABLET ORAL at 08:12

## 2018-10-11 RX ADMIN — Medication 1 TABLET: at 11:14

## 2018-10-11 RX ADMIN — Medication 1 TABLET: at 19:53

## 2018-10-11 RX ADMIN — AMIODARONE HYDROCHLORIDE 200 MG: 200 TABLET ORAL at 08:12

## 2018-10-11 RX ADMIN — OXYCODONE HYDROCHLORIDE AND ACETAMINOPHEN 500 MG: 500 TABLET ORAL at 11:14

## 2018-10-11 RX ADMIN — APIXABAN 2.5 MG: 2.5 TABLET, FILM COATED ORAL at 19:53

## 2018-10-11 RX ADMIN — Medication 2 G: at 21:24

## 2018-10-11 RX ADMIN — SODIUM CHLORIDE 250 ML: 9 INJECTION, SOLUTION INTRAVENOUS at 08:13

## 2018-10-11 RX ADMIN — CLOTRIMAZOLE 1 APPLICATORFUL: 1 CREAM VAGINAL at 08:17

## 2018-10-11 ASSESSMENT — ACTIVITIES OF DAILY LIVING (ADL)
ADLS_ACUITY_SCORE: 13

## 2018-10-11 ASSESSMENT — ENCOUNTER SYMPTOMS: DYSRHYTHMIAS: 1

## 2018-10-11 NOTE — DISCHARGE SUMMARY
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     Patient states she noted becoming particularly dizzy last night when trying to make dinner. States that she has constantly felt dizzy and SOB since that period of time. States she feels that she overall has felt poorly since starting amiodarone, which has persisted even with decreased doses.    **See H&P for full history and physical details**          Discharge Diagnosis:   Acute on chronic heart failure exacerbation  Atrial fibrillation/flutter   Acute on chronic kidney disease  Hypervolemic hyponatremia  Hypertension  UTI         Follow Up:   1. Follow-up with primary cardiologist in 4-6 weeks   2. Follow-up with PCP as needed         Pending Results:   None.          Hospital Course by Problem:    #. Acute CHF exacerbation  #. HFpEF (last EF 65% 5/2016)  Patient presented with 10lb weight gain with noted increase in LE edema in setting of known HFpEF with medication change from bumex to hydrochlorothiazide approximately 1 month ago. Exam noted for JVD to the angle of the jaw and LE edema. BNP elevated to 4581. All consistent with CHF exacerbation. Likely multifactorial 2/2 medication change (bumex-->HCTZ), dietary non compliance (increased salty foods at residence), and increased  fluid intake (recent UTI).   - BB: continue toprol XL  - ACEi/ARB: hold losartan in setting of NEIL   - Volume status: euvolemic; stop home hydrochlorothiazide given hyponatremic and start lasix 20mg PO BID, first dose on Sunday, 10/14   - Shon ant: not on PTA  - daily weights  - follow up in [facility] clinic next week      #. Atrial fibrillation/Atrial flutter w/ h/o RVR (CHADSVASC 5: age, female, HTN, HF exacerbation)  #. 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 Recent PPM interrogation with 84% V paced with 100% underlying AF. Would favor worsened AF is 2/2 increased volume status. Will diurese and reassess.   - s/p DCCV on 10/11   - Rate control: continue toprol XL  - Rhythm control: continue amiodarone  - Anticoagulation: continue apixaban     #. Acute on chronic stage III kidney injury   Cr 1.3 today. Cr 1.1 on admission. Likely in setting of hypovolemia secondary to rapid diuresis.   - losartan was stopped      #. Hypervolemic hyponatremia, resolved   Likely volume accumulation in setting of CHF exacerbation    #. UTI  History of UTI. Will treat with cefdinir as she has in the past  - PCP to follow up final culture results and sensitivities      Chronic Problems  #. HTN  - hold losartan  - continue toprol  - stop hydrochlorothiazide  - start amlodipine 5mg daily       #. RA c/b pulmonary fibrosis  - continue azathioprine, folate  - albuterol neb PRN      #. Hypothyroidism  - continue synthroid      #. GERD  - continue ranitidine      #. Insomnia  - continue remeron      #. Chronic macrocytic anemia  Likely 2/2 AZA  - continue folate    Physical Exam on day of Discharge:  Blood pressure 146/56, pulse 60, temperature 98.4  F (36.9  C), temperature source Oral, resp. rate 18, weight 71.5 kg (157 lb 9.6 oz), SpO2 96 %, not currently breastfeeding.    General: elderly female in NAD  HEENT: MMM  CV: regular, normal S1S2, 2/6 holosystolic murmur at the  apex  Resp: CTAB with good inspiratory effort  Abd: soft, slightly distended but NT   Skin: warm and dry, no rash  Extremities: moving all 4 with no limitations, no LE edema  Neuro: alert, interactive, grossly non-focal     Lab Studies on Day of Discharge:   Last Comprehensive Metabolic Panel:  Sodium   Date Value Ref Range Status   10/12/2018 134 133 - 144 mmol/L Final     Potassium   Date Value Ref Range Status   10/12/2018 4.1 3.4 - 5.3 mmol/L Final     Chloride   Date Value Ref Range Status   10/12/2018 99 94 - 109 mmol/L Final     Carbon Dioxide   Date Value Ref Range Status   10/12/2018 27 20 - 32 mmol/L Final     Anion Gap   Date Value Ref Range Status   10/12/2018 9 3 - 14 mmol/L Final     Glucose   Date Value Ref Range Status   10/12/2018 92 70 - 99 mg/dL Final     Urea Nitrogen   Date Value Ref Range Status   10/12/2018 51 (H) 7 - 30 mg/dL Final     Creatinine   Date Value Ref Range Status   10/12/2018 1.31 (H) 0.52 - 1.04 mg/dL Final     GFR Estimate   Date Value Ref Range Status   10/12/2018 38 (L) >60 mL/min/1.7m2 Final     Comment:     Non  GFR Calc     Calcium   Date Value Ref Range Status   10/12/2018 9.4 8.5 - 10.1 mg/dL Final     CBC RESULTS:   Recent Labs   Lab Test  10/09/18   0553   WBC  6.5   RBC  3.47*   HGB  11.5*   HCT  34.3*   MCV  99   MCH  33.1*   MCHC  33.5   RDW  14.0   PLT  219          Procedures & Significant Findings:   DCCV         Consultations:   EP         Discharge Medications:     Current Discharge Medication List      START taking these medications    Details   amLODIPine (NORVASC) 5 MG tablet Take 1 tablet (5 mg) by mouth daily  Qty: 90 tablet, Refills: 3    Associated Diagnoses: Hypertension goal BP (blood pressure) < 150/90      cefdinir (OMNICEF) 300 MG capsule Take 1 capsule (300 mg) by mouth daily for 6 days  Qty: 6 capsule, Refills: 0    Associated Diagnoses: Acute cystitis without hematuria      clotrimazole (LOTRIMIN) 1 % cream Place 1 Applicatorful  vaginally daily  Qty: 50 g, Refills: 1    Associated Diagnoses: Vaginitis and vulvovaginitis      furosemide (LASIX) 40 MG tablet Take 0.5 tablets (20 mg) by mouth 2 times daily  Qty: 60 tablet, Refills: 3    Comments: Start 10/14  Associated Diagnoses: Heart failure with preserved ejection fraction, NYHA class I (H)         CONTINUE these medications which have NOT CHANGED    Details   ACETAMINOPHEN PO Take 1,000 mg by mouth 2 times daily as needed       albuterol (2.5 MG/3ML) 0.083% neb solution Take 1 vial by nebulization every 6 hours as needed for shortness of breath / dyspnea or wheezing      Artificial Tear Ointment (REFRESH P.M.) OINT Apply  to eye. Daily at bedtime         ranibizumab (LUCENTIS) 0.3 MG/0.05ML SOLN 0.3 mg by Intravitreal route Every 6 weeks      senna-docusate (SENOKOT-S;PERICOLACE) 8.6-50 MG per tablet Take 2 tablets by mouth At Bedtime Hold if diarrhea occurs.  Qty: 120 tablet, Refills: 11    Associated Diagnoses: Constipation, chronic      amiodarone (PACERONE/CODARONE) 200 MG tablet 1st week: 400mg twice a day. 2nd week: 200mg twice a day. Then 200mg daily  Qty: 120 tablet, Refills: 3    Associated Diagnoses: Paroxysmal atrial fibrillation (H)      apixaban ANTICOAGULANT (ELIQUIS) 2.5 MG tablet Take 1 tablet (2.5 mg) by mouth 2 times daily  Qty: 180 tablet, Refills: 3    Associated Diagnoses: Atrial flutter, paroxysmal (H)      Ascorbic Acid (VITAMIN C PO) Take 500 mg by mouth daily       azaTHIOprine (IMURAN) 50 MG tablet Take 1 tablet (50 mg) by mouth daily  Qty: 90 tablet, Refills: 2    Associated Diagnoses: Rheumatoid arthritis involving multiple sites with positive rheumatoid factor (H)      Blood Pressure Monitor KIT 1 kit daily as needed  Qty: 1 kit, Refills: 0    Associated Diagnoses: CHF (congestive heart failure) (H)      calcium-vitamin D (CALTRATE) 600-400 MG-UNIT per tablet Take 1 tablet by mouth 2 times daily       cetirizine (ZYRTEC ALLERGY) 10 MG tablet Take 10 mg by  mouth At Bedtime      COMPOUNDED NON-CONTROLLED SUBSTANCE (CMPD RX) - PHARMACY TO MIX COMPOUNDED MEDICATION Estriol 1mg/gram. Place 1 gram vaginally daily for two weeks, then vaginally twice weekly after.  Qty: 30 g, Refills: 6    Associated Diagnoses: Atrophic vaginitis      fish oil-omega-3 fatty acids (FISH OIL) 1000 MG capsule Take 2 g by mouth daily. 2 capsules daily           folic acid (FOLVITE) 1 MG tablet Take 1 tablet (1 mg) by mouth daily  Qty: 90 tablet, Refills: 3    Associated Diagnoses: Oral aphthae      hydrocortisone 2.5 % cream Apply BID to affected region(s) for 7-10 days.  Qty: 30 g, Refills: 0    Associated Diagnoses: Rash      Hypromellose (GENTEAL MILD OP) Apply  to eye daily.      levothyroxine (SYNTHROID/LEVOTHROID) 75 MCG tablet TAKE ONE TABLET BY MOUTH ONCE DAILY  Qty: 90 tablet, Refills: 1    Associated Diagnoses: Hypothyroidism, unspecified type      loratadine (CLARITIN) 10 MG tablet Take 10 mg by mouth every morning      metoprolol succinate (TOPROL-XL) 25 MG 24 hr tablet Take 1 tablet (25 mg) by mouth daily  Qty: 90 tablet, Refills: 3    Associated Diagnoses: Hypertension goal BP (blood pressure) < 140/90      mirtazapine (REMERON) 15 MG tablet Take 1 tablet (15 mg) by mouth At Bedtime  Qty: 30 tablet, Refills: 1    Associated Diagnoses: Adjustment insomnia      Multiple Vitamins-Minerals (PRESERVISION AREDS PO) Take 1 tablet by mouth daily       nitroglycerin (NITROSTAT) 0.4 MG SL tablet Place 1 tablet (0.4 mg) under the tongue every 5 minutes as needed for chest pain  Qty: 25 tablet, Refills: 0    Associated Diagnoses: Chest pain      !! order for DME Dispense SP Walker-small  Qty: 1 each, Refills: 0    Associated Diagnoses: Pain of toe of right foot; Status post bunionectomy      !! order for DME Equipment being ordered: Dispense baffle, for use with nebulizer.  Qty: 1 each, Refills: 0    Associated Diagnoses: Pneumonia of left upper lobe due to Mycoplasma pneumoniae      !! order  for DME Equipment being ordered: Nebulizer. Use with Albuterol.  Qty: 1 each, Refills: 0    Associated Diagnoses: Hypoxia      !! order for DME Equipment being ordered: Dispense face mask.  Mrs. Spicer is immunosuppressed due to rheumatoid arthritis.  Qty: 1 Box, Refills: 11    Associated Diagnoses: Rheumatoid arthritis involving left wrist with positive rheumatoid factor (H)      ranitidine (ZANTAC) 150 MG tablet Take 1 tablet (150 mg) by mouth 2 times daily  Qty: 60 tablet, Refills: 5    Associated Diagnoses: Gastroesophageal reflux disease without esophagitis      saccharomyces boulardii (FLORASTOR) 250 MG capsule Take 250 mg by mouth daily       !! - Potential duplicate medications found. Please discuss with provider.      STOP taking these medications       hydrochlorothiazide (HYDRODIURIL) 25 MG tablet Comments:   Reason for Stopping:         losartan (COZAAR) 50 MG tablet Comments:   Reason for Stopping:         STATIN NOT PRESCRIBED, INTENTIONAL, Comments:   Reason for Stopping:                    Discharge Instructions and Follow-Up:     Discharge Procedure Orders  Medication Therapy Management Referral   Referral Type: Med Therapy Management     Home care nursing referral   Referral Type: Home Health Therapies & Aides     Home Care PT Referral for Hospital Discharge   Referral Type: Home Health Therapies & Aides     Reason for your hospital stay   Order Comments: You were admitted for extra fluid and atrial fibrillation. We gave you medications to help you pee and converted you to a normal heart rhythm.     Adult [facility] Follow-up and recommended labs and tests   Order Comments: Follow up in the [facility] clinic and with EP as scheduled.  Follow up with PCP as needed.    Appointments on [facility] and/or [facility] (with [facility] or [facility] provider or service). Call [phone] if you haven't heard regarding these appointments within 7 days of discharge.     Activity   Order Comments: Your activity upon  discharge: activity as tolerated   Order Specific Question Answer Comments   Is discharge order? Yes      Monitor and record   Order Comments: weight every day     Full Code   Order Specific Question Answer Comments   Code status determined by: Discussion with patient/legal decision maker      Diet   Order Comments: Follow this diet upon discharge: Orders Placed This Encounter     Fluid restriction 1800 ML FLUID     Snacks/Supplements Adult: Other; Pt does not tolerate milk. Tolerates most other dairy products.; With Meals     Low Saturated Fat Na <2400 mg   Order Specific Question Answer Comments   Is discharge order? Yes               Discharge Disposition:   Stable.         Condition on Discharge:   Discharge condition: Stable   Code status on discharge: Full Code        Date of service: 10/12/2018    The patient was discussed with Dr. Littlejohn.    60 minutes spent in discharge, including >50% in counseling and coordination of care, medication review and plan of care recommended on follow up. Questions were answered.     It was our pleasure to care for Linda Spicer during this hospitalization. Please do not hesitate to contact me should there be questions regarding the hospital course or discharge plan.      Talat Zendejas MD  Cardiovascular Medicine Fellow, PGY-7  [phone]

## 2018-10-11 NOTE — ANESTHESIA CARE TRANSFER NOTE
Patient: Linda Spicer    Procedure(s):  Cardioversion    Diagnosis: Artial Fibrillation  Diagnosis Additional Information: No value filed.    Anesthesia Type:   No value filed.     Note:  Airway :Nasal Cannula  Patient transferred to:Telemetry/Step Down Unit  Comments: Patient responding and breathing without difficulty; care to Echo RNHandoff Report: Identifed the Patient, Identified the Reponsible Provider, Reviewed the pertinent medical history, Discussed the surgical course, Reviewed Intra-OP anesthesia mangement and issues during anesthesia, Set expectations for post-procedure period and Allowed opportunity for questions and acknowledgement of understanding      Vitals: (Last set prior to Anesthesia Care Transfer)    CRNA VITALS  10/11/2018 0935 - 10/11/2018 1005      10/11/2018             NIBP: (!)  200/100    Pulse: 69    SpO2: 100 %    Resp Rate (observed): 16    EKG: Sinus rhythm                Electronically Signed By: LINDY GONZALEZ APRN CRNA  October 11, 2018  10:05 AM

## 2018-10-11 NOTE — PROGRESS NOTES
CLINICAL NUTRITION SERVICES    Reason for Assessment:  Low-sodium (2 g/day) nutrition education.  Received consult.     Diet History:  Pt reports no history of receiving low-sodium nutrition education in the past.    Nutrition Diagnosis:  Food- and nutrition-related knowledge deficit r/t no previous knowledge of low-sodium diet AEB pt report of no previous formal low-sodium nutrition education.    Nutrition Prescription/Recs:  Continue low-sodium diet.      Interventions:  Nutrition Education  1. Provided verbal instruction on a heart-healthy diet with emphasis on low-sodium meal planning.  2. Provided the following handouts: How to Read Nutrition Labels, Low-Sodium Foods and Drinks, Tips for a Low-Sodium Diet, and Managing Fluid Restriction.      Goals:    Pt will verbalize at least five high sodium foods and the importance of avoiding added salt to foods for cooking or seasoning foods.     Follow-up:   Patient to ask any further nutrition-related questions before discharge. In addition, pt may request outpatient RD appointment.    Lizett Yip  Dietetic Intern    I have read and agree with the above nutrition note.   Nara Morris, MS, RD, LD, [facility]   6C Pgr:  [phone]

## 2018-10-11 NOTE — PROGRESS NOTES
Pt arrived in ECHO department for scheduled Cardioversion.   Procedure explained, questions answered and consent signed. Discharge instructions discussed with patient.  Anesthesia gave pt 40mg IV brevitol for sedation and pt was DCCV at 150 Joules to a SR. Post EKG completed and placed in chart.   Pt denied chest pain or any other discomfort after procedure and was monitored until and VSS. Escorted back to  via stretcher and hospital transport.   Report given to BRANDI Taylor.

## 2018-10-11 NOTE — PROGRESS NOTES
Cardiology Progress Note  Linda Spicer MRN: [id number]  Age: 87 year old, : 1931  10/09/18               Subjective     No acute events overnight. This morning, patient reports feeling well. Is looking forward to undergoing cardioversion today.           Objective     Vitals:  /74 (BP Location: Left arm, Cuff Size: Adult Regular)  Pulse 60  Temp 98.1  F (36.7  C) (Oral)  Resp 14  Wt 70.7 kg (155 lb 14.4 oz)  LMP  (LMP Unknown)  SpO2 97%  BMI 27.18 kg/m2       Intake/Output Summary (Last 24 hours) at 10/11/18 1145  Last data filed at 10/11/18 1100   Gross per 24 hour   Intake              450 ml   Output             1425 ml   Net             -975 ml       Vitals:    10/09/18 0600 10/10/18 0146 10/11/18 0220   Weight: 70.5 kg (155 lb 8 oz) 70.2 kg (154 lb 12.8 oz) 70.7 kg (155 lb 14.4 oz)       General: elderly female in NAD  HEENT: MMM  CV: regular, normal S1S2, 2/6 holosystolic murmur at the apex  Resp: CTAB with good inspiratory effort  Abd: soft, slightly distended but NT   Skin: warm and dry, no rash  Extremities: moving all 4 with no limitations, no LE edema  Neuro: alert, interactive, grossly non-focal           Data:     Labs reviewed.         Medications     Medications    amiodarone  200 mg Oral BID     amLODIPine  5 mg Oral Daily     apixaban ANTICOAGULANT  2.5 mg Oral BID     ascorbic acid (VITAMIN C) tablet 500 mg  500 mg Oral Daily     azaTHIOprine  50 mg Oral Daily     calcium carbonate 600 mg-vitamin D 400 units  1 tablet Oral BID     cetirizine  10 mg Oral At Bedtime     clotrimazole  1 Applicatorful Vaginal Daily     fish oil-omega-3 fatty acids  1 g Oral Daily     folic acid  1 mg Oral Daily     levothyroxine  75 mcg Oral Daily     metoprolol succinate  25 mg Oral Daily     mirtazapine  15 mg Oral At Bedtime     multivitamin  1 tablet Oral Daily     ranitidine  150 mg Oral Daily     sodium chloride (PF)  20 mL Intracatheter Once     sodium  chloride (PF)  3 mL Intracatheter Q8H       - MEDICATION INSTRUCTIONS -       - MEDICATION INSTRUCTIONS -             Changes Today:     - Cardioversion today  - Continue to hold diuresis and losartan   - Initiated amlodipine 5mg every day PO         Assessment and Plan:     Linda Spicer is an 87 year old female with PMH of PAF/AFL (CHADSVASC 5) s/p DCCV (6/2018, 9/2018), HFpEF (last EF 65% 5/2016), 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     #. Acute CHF exacerbation  #. HFpEF (last EF 65% 5/2016)  Patient presented with 10lb weight gain with noted increase in LE edema in setting of known HFpEF with medication change from bumex to hydrochlorothiazide approx 1 month ago. Exam noted for JVD to the angle of the jaw and LE edema. BNP elevated to 4581. All consistent with CHF exacerbation. Likely multifactorial 2/2 medication change (bumex-->HCTZ), dietary non compliance (increased salty foods at residence), and increased fluid intake (recent UTI).   - BB: continue toprol XL  - ACEi/ARB: hold losartan in setting of NEIL   - Volume status: hypovolemic; hold diuretic; home hydrochlorothiazide held given hyponatremic   - Initiate lasix 40mg PO BID upon discharge to start on Sunday, 10/14   - Shon ant: not on PTA  - strict I/O; goal net negative 1.5-2L  - daily weights  - trend BMP  - fluid restriction     #. Atrial fibrillation/Atrial flutter w/ h/o RVR (CHADSVASC 5: age, female, HTN, HF exacerbation)  #. 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 Recent PPM interrogation with 84% V paced with 100% underlying AF. Would favor worsened AF is 2/2 increased volume status. Will diurese and reassess.   - s/p DCCV on 10/11   - Rate control: continue toprol XL  - Rhythm control: continue  amiodarone  - Anticoagulation: continue apixaban  - telemetry    #. Acute on chronic stage III kidney injury   Cr 1.7 today. Cr 1.1 on admission. Likely in setting of hypovolemia secondary to diuresis.   - Hold nephrotoxic medications including losartan      #. Hypervolemic hyponatremia, resolved   Likely volume accumulation in setting of CHF exacerbation  - trend with diuresis     Chronic Problems  #. HTN  - hold losartan  - continue toprol  - stop hydrochlorothiazide  - start amlodipine 5mg every day today      #. RA c/b pulmonary fibrosis  - continue azathioprine, folate  - albuterol neb PRN     #. Hypothyroidism  - continue synthroid     #. GERD  - continue ranitidine     #. Insomnia  - continue remeron     #. Chronic macrocytic anemia  Likely 2/2 AZA  - continue folate    FEN: 2gm Na; 1.8L fluid restriction  PPX: apixaban   CODE: Full Code  Disposition: discharge to home tomorrow with [facility] clinic follow-up next week      Patient was discussed with staff attending, Dr. Littlejohn.    Cameron Pagan MD  Internal Medicine, PGY-2  Cardiology Service  Pager: [phone]

## 2018-10-11 NOTE — PROGRESS NOTES
Shift: 0700 - 1930  VS: Temp: 97.8  F (36.6  C) Temp src: Oral BP: 148/58 Pulse: 60 Heart Rate: 59 Resp: 16 SpO2: 99 % O2 Device: None (Room air)    Pain: Denies pain.  Neuro: A&Ox4. Wears glasses. Pleasant and cooperative with care.   Cardiac:   100% V-Paced. Successful cardioversion this AM at ~0930, paced at 60BPM, received 150 Jules x1. No Ectopy. Denies chest pain, SOB.   Respiratory: Lung sounds clear on RA  GI/Diet/Appetite: Low Sat Fat diet, good appetite. LBM 10/11, bowel sounds active.   :  Voiding spontaneously w/GOP. UA sent for cloudy, odorous urine. UC pending.   LDA's: PIV to PRISCA PATEL  Skin: Intact.   Activity: SBA.  Tests/Procedures: Cardioversion.   Pertinent Labs/Lab Collection:      Plan: Probable Discharge 10/12

## 2018-10-11 NOTE — ANESTHESIA PREPROCEDURE EVALUATION
Anesthesia Evaluation     .             ROS/MED HX    ENT/Pulmonary: Comment: Interstitial lung disease      Neurologic:  - neg neurologic ROS     Cardiovascular: Comment: A flutter since 08/28    (+) hypertension----. : . CHF . . pacemaker :. dysrhythmias a-flutter, .       METS/Exercise Tolerance:     Hematologic:     (+) Anemia, History of Transfusion -      Musculoskeletal: Comment: DDD - lumbar  osteoporosis        GI/Hepatic: Comment: Irritable bowel syndrome        Renal/Genitourinary: Comment: Stress incontinence    (+) chronic renal disease, type: CRI,       Endo:     (+) thyroid problem hypothyroidism, .      Psychiatric:  - neg psychiatric ROS       Infectious Disease:         Malignancy:         Other:                     Physical Exam  Normal systems: pulmonary and dental    Airway   Mallampati: III  TM distance: >3 FB  Neck ROM: full    Dental     Cardiovascular   Rhythm and rate: irregular      Pulmonary                         Anesthesia Plan      History & Physical Review  History and physical reviewed and following examination; no interval change.    ASA Status:  3 .    NPO Status:  > 8 hours    Plan for General with Intravenous induction. Maintenance will be TIVA.    PONV prophylaxis:  Ondansetron (or other 5HT-3) and Dexamethasone or Solumedrol       Postoperative Care  Postoperative pain management:  IV analgesics and Multi-modal analgesia.      Consents  Anesthetic plan, risks, benefits and alternatives discussed with:  Patient..        Late entry note due to need for immediate patient care.

## 2018-10-11 NOTE — ANESTHESIA POSTPROCEDURE EVALUATION
Patient: Linda Spicer    Procedure(s):  Cardioversion    Diagnosis:Artial Fibrillation  Diagnosis Additional Information: No value filed.    Anesthesia Type:  No value filed.    Note:  Anesthesia Post Evaluation    Patient location during evaluation: Cardiac Cath Lab and Bedside  Patient participation: Able to fully participate in evaluation  Level of consciousness: awake and alert  Pain management: satisfactory to patient  Airway patency: patent  Cardiovascular status: acceptable and stable  Respiratory status: acceptable and spontaneous ventilation  Hydration status: euvolemic  PONV: controlled     Anesthetic complications: None          Last vitals:  Vitals:    10/11/18 1037 10/11/18 1308 10/11/18 1546   BP: 140/74 150/71 148/58   Pulse:      Resp:  16 16   Temp:  36.7  C (98  F) 36.6  C (97.8  F)   SpO2: 97% 100% 99%         Electronically Signed By: Darryl Mejía MD  October 11, 2018  4:19 PM

## 2018-10-12 ENCOUNTER — APPOINTMENT (OUTPATIENT)
Dept: PHYSICAL THERAPY | Facility: CLINIC | Age: 83
DRG: 291 | End: 2018-10-12
Attending: INTERNAL MEDICINE
Payer: MEDICARE

## 2018-10-12 VITALS
BODY MASS INDEX: 27.48 KG/M2 | RESPIRATION RATE: 18 BRPM | SYSTOLIC BLOOD PRESSURE: 146 MMHG | OXYGEN SATURATION: 96 % | HEART RATE: 60 BPM | TEMPERATURE: 98.4 F | DIASTOLIC BLOOD PRESSURE: 56 MMHG | WEIGHT: 157.6 LBS

## 2018-10-12 LAB
ANION GAP SERPL CALCULATED.3IONS-SCNC: 9 MMOL/L (ref 3–14)
BUN SERPL-MCNC: 51 MG/DL (ref 7–30)
CALCIUM SERPL-MCNC: 9.4 MG/DL (ref 8.5–10.1)
CHLORIDE SERPL-SCNC: 99 MMOL/L (ref 94–109)
CO2 SERPL-SCNC: 27 MMOL/L (ref 20–32)
CREAT SERPL-MCNC: 1.31 MG/DL (ref 0.52–1.04)
GFR SERPL CREATININE-BSD FRML MDRD: 38 ML/MIN/1.7M2
GLUCOSE SERPL-MCNC: 92 MG/DL (ref 70–99)
INTERPRETATION ECG - MUSE: NORMAL
MAGNESIUM SERPL-MCNC: 2.1 MG/DL (ref 1.6–2.3)
POTASSIUM SERPL-SCNC: 4.1 MMOL/L (ref 3.4–5.3)
SODIUM SERPL-SCNC: 134 MMOL/L (ref 133–144)

## 2018-10-12 PROCEDURE — A9270 NON-COVERED ITEM OR SERVICE: HCPCS | Mod: GY | Performed by: INTERNAL MEDICINE

## 2018-10-12 PROCEDURE — 83735 ASSAY OF MAGNESIUM: CPT | Performed by: INTERNAL MEDICINE

## 2018-10-12 PROCEDURE — 97110 THERAPEUTIC EXERCISES: CPT | Mod: GP

## 2018-10-12 PROCEDURE — 40000193 ZZH STATISTIC PT WARD VISIT

## 2018-10-12 PROCEDURE — 99239 HOSP IP/OBS DSCHRG MGMT >30: CPT | Mod: GC | Performed by: INTERNAL MEDICINE

## 2018-10-12 PROCEDURE — 25000132 ZZH RX MED GY IP 250 OP 250 PS 637: Mod: GY | Performed by: STUDENT IN AN ORGANIZED HEALTH CARE EDUCATION/TRAINING PROGRAM

## 2018-10-12 PROCEDURE — 80048 BASIC METABOLIC PNL TOTAL CA: CPT | Performed by: INTERNAL MEDICINE

## 2018-10-12 PROCEDURE — A9270 NON-COVERED ITEM OR SERVICE: HCPCS | Mod: GY | Performed by: STUDENT IN AN ORGANIZED HEALTH CARE EDUCATION/TRAINING PROGRAM

## 2018-10-12 PROCEDURE — 25000132 ZZH RX MED GY IP 250 OP 250 PS 637: Mod: GY | Performed by: INTERNAL MEDICINE

## 2018-10-12 PROCEDURE — 25000131 ZZH RX MED GY IP 250 OP 636 PS 637: Mod: GY | Performed by: STUDENT IN AN ORGANIZED HEALTH CARE EDUCATION/TRAINING PROGRAM

## 2018-10-12 PROCEDURE — 36415 COLL VENOUS BLD VENIPUNCTURE: CPT | Performed by: INTERNAL MEDICINE

## 2018-10-12 RX ORDER — FUROSEMIDE 40 MG
20 TABLET ORAL 2 TIMES DAILY
Qty: 60 TABLET | Refills: 3 | Status: SHIPPED | OUTPATIENT
Start: 2018-10-12 | End: 2018-11-01

## 2018-10-12 RX ORDER — CLOTRIMAZOLE 1 %
1 CREAM WITH APPLICATOR VAGINAL DAILY
Qty: 50 G | Refills: 1 | Status: SHIPPED | OUTPATIENT
Start: 2018-10-12 | End: 2018-10-25

## 2018-10-12 RX ORDER — CEFDINIR 300 MG/1
300 CAPSULE ORAL DAILY
Status: DISCONTINUED | OUTPATIENT
Start: 2018-10-12 | End: 2018-10-12 | Stop reason: HOSPADM

## 2018-10-12 RX ORDER — CEFDINIR 300 MG/1
300 CAPSULE ORAL DAILY
Qty: 6 CAPSULE | Refills: 0 | Status: SHIPPED | OUTPATIENT
Start: 2018-10-12 | End: 2019-02-07

## 2018-10-12 RX ORDER — AMLODIPINE BESYLATE 5 MG/1
5 TABLET ORAL DAILY
Qty: 90 TABLET | Refills: 3 | Status: SHIPPED | OUTPATIENT
Start: 2018-10-12 | End: 2018-11-01

## 2018-10-12 RX ADMIN — Medication 1 TABLET: at 08:26

## 2018-10-12 RX ADMIN — METOPROLOL SUCCINATE 25 MG: 25 TABLET, EXTENDED RELEASE ORAL at 08:26

## 2018-10-12 RX ADMIN — CLOTRIMAZOLE 1 APPLICATORFUL: 1 CREAM VAGINAL at 05:00

## 2018-10-12 RX ADMIN — AZATHIOPRINE 50 MG: 50 TABLET ORAL at 08:30

## 2018-10-12 RX ADMIN — AMIODARONE HYDROCHLORIDE 200 MG: 200 TABLET ORAL at 08:33

## 2018-10-12 RX ADMIN — OXYCODONE HYDROCHLORIDE AND ACETAMINOPHEN 500 MG: 500 TABLET ORAL at 08:30

## 2018-10-12 RX ADMIN — CEFDINIR 300 MG: 300 CAPSULE ORAL at 08:41

## 2018-10-12 RX ADMIN — OMEGA-3 FATTY ACIDS CAP 1000 MG 1 G: 1000 CAP at 08:31

## 2018-10-12 RX ADMIN — FOLIC ACID 1 MG: 1 TABLET ORAL at 08:30

## 2018-10-12 RX ADMIN — APIXABAN 2.5 MG: 2.5 TABLET, FILM COATED ORAL at 08:33

## 2018-10-12 RX ADMIN — LEVOTHYROXINE SODIUM 75 MCG: 75 TABLET ORAL at 08:26

## 2018-10-12 RX ADMIN — AMLODIPINE BESYLATE 5 MG: 5 TABLET ORAL at 08:31

## 2018-10-12 RX ADMIN — RANITIDINE 150 MG: 150 TABLET ORAL at 08:25

## 2018-10-12 ASSESSMENT — ACTIVITIES OF DAILY LIVING (ADL)
ADLS_ACUITY_SCORE: 13

## 2018-10-12 NOTE — OP NOTE
CARDIOVERSION    PROCEDURE:  direct current cardioversion  PROCEDURE DATE: 10/11/2018     Pre-procedure diagnosis:  Atrial fibrillation  Post-procedure diagnosis: s/p direct current cardioverison  Complications:  none    BRIEF CLINICAL HISTORY:  Ms. 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 She was seen in EP clinic after last DCCV on 9/19/18 after she had recurrent AF. She was started on oral amiodarone loading with plan to perform DCCV after amiodarone load. She remained in AF. She is now admitted with HFpEF exacerbation. She has underwent diuresis and now presents for DCCV.      PROCEDURE:  The patient arrived at the Echo Laboratory in a fasting, non-sedated state.  Informed consent was previously obtained from patient, who understood indications, risks, and benefits of the procedure.  Patient taking uninterrupted anticoagulation for at least the last month.  With help from Anesthesia, patient was deeply sedated.  150J of synchronized biphasic shock was delivered through the cardiac monitor, and the patient was successfully converted to normal sinus rhythm.  After sedation wore off, patient awoke and remained neurologically intact.  The patient tolerated the procedure well from a hemodynamic and respiratory standpoint without any complications.  She was observed in the Echo Laboratory and transferred back to inpatient unit after sedation wore off and she was ambulatory.    IMPRESSION:  1.  Successful direct current cardioversion with 150J biphasic shock.    RECOMMENDATIONS/PLANS:  1.   Transfer back to inpatient unit   2.   Continue anticoagulation  3.  Follow up in EP as scheduled as outpatient.     SUNNI Ortiz CNP  Electrophysiology Consult Service  Pager: [phone]

## 2018-10-12 NOTE — PROGRESS NOTES
DISCHARGE                         10/12/2018  1:18 PM  ----------------------------------------------------------------------------  Discharged to: Home  Via: Automobile  Accompanied by: son  Discharge Instructions: diet, activity, medications, follow up appointments, when to call the MD, aftercare instructions, and what to watchout for (i.e. s/s of infection, increasing SOB, palpitations, chest pain,)  Prescriptions: To be filled by [facility] per pt's request; medication list reviewed & sent with pt  Follow Up Appointments: arranged; information given  Belongings: All sent with pt  IV: out  Telemetry: off  Pt and son exhibit understanding of above discharge instructions; all questions answered.    Discharge Paperwork: Signed, copied, and sent home with patient.

## 2018-10-12 NOTE — PROGRESS NOTES
Care Coordinator - Discharge Planning    Admission Date/Time:  10/8/2018  Attending MD:  Talat Littlejohn     Data  Date of initial CC assessment: 10/9/2018  Chart reviewed, discussed with interdisciplinary team.   Patient was admitted for:   1. Heart failure with preserved ejection fraction, NYHA class I (H)    2. Paroxysmal atrial fibrillation (H)    3. Hypertension goal BP (blood pressure) < 150/90    4. Vaginitis and vulvovaginitis    5. Persistent atrial fibrillation (H)    6. Acute cystitis without hematuria         Assessment   Full assessment completed in previous note  Concerns with insurance coverage for discharge needs: None stated by pt.  Current Living Situation: Patient lives alone.   Support System: Supportive and Involved family.   Services Involved: [facility]  Transportation at Discharge: Pt's son, Arjun Spicer said that either he or his sister, Xi will provide pt with a ride home.   Transportation to Medical Appointments: family to provide.    - Name of caregiver: son and daughter    Per MD, pt medically ready for discharge today. This writer update Aliya intake RN at [facility] and faxed over discharge orders/updated clinicals.     Intervention:  Arrangements made with [facility] (Ph: [phone] Fax: [phone]) for RN eval post hospitalization, resume previous orders, assess vital signs, respiratory and cardiac status, activity tolerance, hydration, nutrition, med set up and management, heart failure education reinforcement. HHA for assist with ADL's. PT/OT eval and treat for deconditioning, strengthening, and endurance.      Plan  Anticipated Discharge Date: 10/12/2018  Anticipated Discharge Plan: Discharge to home with resumption of home care    CTS Handoff completed:  YES  Renetta Layton, RN BSN  6C Unit Care Coordinator  Phone number: [phone]  Pager: [phone]

## 2018-10-15 ENCOUNTER — DOCUMENTATION ONLY (OUTPATIENT)
Dept: LAB | Facility: CLINIC | Age: 83
End: 2018-10-15

## 2018-10-15 ENCOUNTER — TELEPHONE (OUTPATIENT)
Dept: FAMILY MEDICINE | Facility: CLINIC | Age: 83
End: 2018-10-15

## 2018-10-15 ENCOUNTER — ANESTHESIA EVENT (OUTPATIENT)
Dept: SURGERY | Facility: CLINIC | Age: 83
End: 2018-10-15
Payer: MEDICARE

## 2018-10-15 ENCOUNTER — OFFICE VISIT (OUTPATIENT)
Dept: CARDIOLOGY | Facility: CLINIC | Age: 83
End: 2018-10-15
Attending: INTERNAL MEDICINE
Payer: MEDICARE

## 2018-10-15 ENCOUNTER — TELEPHONE (OUTPATIENT)
Dept: CARE COORDINATION | Facility: CLINIC | Age: 83
End: 2018-10-15

## 2018-10-15 ENCOUNTER — PATIENT OUTREACH (OUTPATIENT)
Dept: CARE COORDINATION | Facility: CLINIC | Age: 83
End: 2018-10-15

## 2018-10-15 VITALS
HEIGHT: 64 IN | HEART RATE: 62 BPM | OXYGEN SATURATION: 100 % | SYSTOLIC BLOOD PRESSURE: 143 MMHG | DIASTOLIC BLOOD PRESSURE: 82 MMHG | BODY MASS INDEX: 27.05 KG/M2

## 2018-10-15 DIAGNOSIS — I48.0 PAROXYSMAL ATRIAL FIBRILLATION (H): Primary | ICD-10-CM

## 2018-10-15 DIAGNOSIS — I50.30 HEART FAILURE WITH PRESERVED EJECTION FRACTION (H): Primary | ICD-10-CM

## 2018-10-15 DIAGNOSIS — I50.30 CONGESTIVE HEART FAILURE WITH PRESERVED LV FUNCTION, NYHA CLASS 3 (H): ICD-10-CM

## 2018-10-15 LAB
BACTERIA SPEC CULT: ABNORMAL
BACTERIA SPEC CULT: ABNORMAL
INTERPRETATION ECG - MUSE: NORMAL
Lab: ABNORMAL
SPECIMEN SOURCE: ABNORMAL

## 2018-10-15 PROCEDURE — G0463 HOSPITAL OUTPT CLINIC VISIT: HCPCS | Mod: 25,ZF

## 2018-10-15 PROCEDURE — 99214 OFFICE O/P EST MOD 30 MIN: CPT | Mod: 25 | Performed by: NURSE PRACTITIONER

## 2018-10-15 PROCEDURE — 93010 ELECTROCARDIOGRAM REPORT: CPT | Mod: ZP | Performed by: INTERNAL MEDICINE

## 2018-10-15 PROCEDURE — 93005 ELECTROCARDIOGRAM TRACING: CPT | Mod: ZF

## 2018-10-15 RX ORDER — MAGNESIUM SULFATE HEPTAHYDRATE 40 MG/ML
2 INJECTION, SOLUTION INTRAVENOUS
Status: CANCELLED | OUTPATIENT
Start: 2018-10-15

## 2018-10-15 RX ORDER — POTASSIUM CHLORIDE 1500 MG/1
20 TABLET, EXTENDED RELEASE ORAL
Status: CANCELLED | OUTPATIENT
Start: 2018-10-15

## 2018-10-15 RX ORDER — POTASSIUM CHLORIDE 1500 MG/1
40 TABLET, EXTENDED RELEASE ORAL
Status: CANCELLED | OUTPATIENT
Start: 2018-10-15

## 2018-10-15 RX ORDER — LIDOCAINE 40 MG/G
CREAM TOPICAL
Status: CANCELLED | OUTPATIENT
Start: 2018-10-15

## 2018-10-15 ASSESSMENT — PAIN SCALES - GENERAL: PAINLEVEL: NO PAIN (0)

## 2018-10-15 NOTE — PATIENT INSTRUCTIONS
Cardiology Provider you saw in clinic today: ENEDELIA Velarde    Medication Changes:      Labs/Tests needed:       Follow-up Visit:      Further Instructions:      Recommend a low sodium diet (less than 2 grams/day)     Take daily weights and notify clinic if a weight gain of more than 3 lbs in a day or 5 lbs in a week.     You are scheduled for a Cardioversion at the [facility] ([address], [place] [address], [phone]).       Follow these instructions:    1. Report to the GOLD waiting room in the [facility] on: October 15, 2018   at 7am.   Please arrive at the time listed on this letter and disregard any phone reminder times.     2. Please do not eat anything for 8 hours prior to your procedure. You may have sips of water up until 2 hours prior to your arrival.    3. The morning of your procedure you may take your scheduled medications with a SIP of water. If you take diabetic medications or a diuretic, you may hold these.     4. You will receive medication that makes you sleepy; you will need a  and someone to stay with you for 24 hours following this procedure.    You should not make any legal decisions for 24 hours following discharge.         If your question concerns the schedule/appointment times, contact:  [facility]   [phone]          You will receive all normal lab and testing results via [employer] or mail if not reviewed in clinic today. Please contact our office if you need assistance with setting up [facility].    If you need a medication refill please contact your pharmacy. Please allow 3 business days for your refill to be completed.     As always, thank you for trusting us with your health care needs!    If you have any questions regarding your visit please contact your care team:   Cardiology  Telephone Number    EP RN  Electrophysiology Nurse Coordinator [phone]     Call for EP procedure scheduling concerns  Lakeshia  [facility] Wood  [phone]           Device Clinic (Pacemakers, ICDs, Loop)   RN's : Saloni Cannon Connie, Dawn During business hours: [phone]    After business hours:   [phone]- select option 4 and ask for job code [id number].

## 2018-10-15 NOTE — NURSING NOTE
Chief Complaint   Patient presents with     Follow Up For      EKG diagnosis paroxysmal atrial fibrillation      Vitals were taken and medications were reconciled. EKG was performed    Blaire ANN  10:54 AM

## 2018-10-15 NOTE — TELEPHONE ENCOUNTER
Patient discharged from [facility] IP  ( Inpatient or ER).    Discharge location: [facility]  Discharge date: 10/12/18  Diagnosis: Heart Failure With Preserved Ejection Fraction, Nyha Class I (H)  Patient has been in the ER/IP 0/1 times.  Care Coord:  Enrolled  10/4/18  Please follow up as appropriate. If no follow up required, please close encounter.

## 2018-10-15 NOTE — PROGRESS NOTES
"    Heart Care - Clinical Cardiac Electrophysiology       HPI: Linda Spicer is an 87 year old female who presents with her son for follow up of PAF/AFL and dizziness.  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 She is highly symptomatic with PAF/AFL.  She had been on propafenone since 2017 with continued recurrences so it was discontinued. She has not been a good candidate for AF ablation due to her high risk of stroke or bleed r/t her advanced age. Multaq was going to be tried, but was cost prohibitive as it is not covered by her insurance. She has limited AAT options given variable renal function (prefer to avoid Sotalol or dofetilide), therefore, an amiodarone load was initiated on 9/19/2018.  She was hospitalized for a HF exacerbation last week and was diuresed and underwent a successful DCCV on 10/11/2018.     Reviewed current interval:  - Echocardiogram normal LV function with EF 55-60%, mild pulmonary hypertension, and mild-moderate TI   - Current cardiac medications: Amiodarone 200 mg daily, amlodipine 5 mg daily, apixaban 2.5 mg twice daily, furosemide 20 mg twice daily, metoprolol Xl 25 mg daily   - Presenting EKG personally reviewed tracings: atrial flutter, Vent rate 60, OK interval NA ms, QRS duration 158 ms, QTc 490 ms.    Current Interval history:   States that she felt better after her cardioversion last Thursday but yesterday morning (Sunday) she noted increased fatigue, palpitations \"hearing\" her heartbeat, and shortness of breath again that feels like her AF.  Patient states that she never misses doses of medication. States that she was 154 lbs this morning, down from 165 last week in the hospital. Denies recent weight gain or salt intake.  Denies chest pain or pressure, syncope, dyspnea at rest, " orthopnea, PND, abdominal edema, pedal edema, or claudication.  Denies easy bruising or bleeding, hematuria, hematochezia, and epistaxis. Denies signs/symptoms of stroke such as visual disturbance, difficulty speaking, facial drooping, confusion, problems with gait, or any new numbness or weakness.    Current Outpatient Prescriptions   Medication Sig Dispense Refill     ACETAMINOPHEN PO Take 1,000 mg by mouth 2 times daily as needed        albuterol (2.5 MG/3ML) 0.083% neb solution Take 1 vial by nebulization every 6 hours as needed for shortness of breath / dyspnea or wheezing       amiodarone (PACERONE/CODARONE) 200 MG tablet 1st week: 400mg twice a day. 2nd week: 200mg twice a day. Then 200mg daily 120 tablet 3     amLODIPine (NORVASC) 5 MG tablet Take 1 tablet (5 mg) by mouth daily 90 tablet 3     apixaban ANTICOAGULANT (ELIQUIS) 2.5 MG tablet Take 1 tablet (2.5 mg) by mouth 2 times daily 180 tablet 3     Artificial Tear Ointment (REFRESH P.M.) OINT Apply  to eye. Daily at bedtime          Ascorbic Acid (VITAMIN C PO) Take 500 mg by mouth daily        azaTHIOprine (IMURAN) 50 MG tablet Take 1 tablet (50 mg) by mouth daily 90 tablet 2     Blood Pressure Monitor KIT 1 kit daily as needed 1 kit 0     calcium-vitamin D (CALTRATE) 600-400 MG-UNIT per tablet Take 1 tablet by mouth 2 times daily        cefdinir (OMNICEF) 300 MG capsule Take 1 capsule (300 mg) by mouth daily for 6 days 6 capsule 0     cetirizine (ZYRTEC ALLERGY) 10 MG tablet Take 10 mg by mouth At Bedtime       clotrimazole (LOTRIMIN) 1 % cream Place 1 Applicatorful vaginally daily 50 g 1     COMPOUNDED NON-CONTROLLED SUBSTANCE (CMPD RX) - PHARMACY TO MIX COMPOUNDED MEDICATION Estriol 1mg/gram. Place 1 gram vaginally daily for two weeks, then vaginally twice weekly after. 30 g 6     fish oil-omega-3 fatty acids (FISH OIL) 1000 MG capsule Take 2 g by mouth daily. 2 capsules daily            folic acid (FOLVITE) 1 MG tablet Take 1 tablet (1 mg) by mouth  daily 90 tablet 3     furosemide (LASIX) 40 MG tablet Take 0.5 tablets (20 mg) by mouth 2 times daily 60 tablet 3     hydrocortisone 2.5 % cream Apply BID to affected region(s) for 7-10 days. 30 g 0     Hypromellose (GENTEAL MILD OP) Apply  to eye daily.       levothyroxine (SYNTHROID/LEVOTHROID) 75 MCG tablet TAKE ONE TABLET BY MOUTH ONCE DAILY 90 tablet 1     loratadine (CLARITIN) 10 MG tablet Take 10 mg by mouth every morning       metoprolol succinate (TOPROL-XL) 25 MG 24 hr tablet Take 1 tablet (25 mg) by mouth daily 90 tablet 3     mirtazapine (REMERON) 15 MG tablet Take 1 tablet (15 mg) by mouth At Bedtime 30 tablet 1     Multiple Vitamins-Minerals (PRESERVISION AREDS PO) Take 1 tablet by mouth daily        nitroglycerin (NITROSTAT) 0.4 MG SL tablet Place 1 tablet (0.4 mg) under the tongue every 5 minutes as needed for chest pain 25 tablet 0     order for DME Dispense SP Walker-small 1 each 0     order for DME Equipment being ordered: Dispense baffle, for use with nebulizer. 1 each 0     order for DME Equipment being ordered: Nebulizer. Use with Albuterol. 1 each 0     order for DME Equipment being ordered: Dispense face mask.  Mrs. Spicer is immunosuppressed due to rheumatoid arthritis. 1 Box 11     ranibizumab (LUCENTIS) 0.3 MG/0.05ML SOLN 0.3 mg by Intravitreal route Every 6 weeks       ranitidine (ZANTAC) 150 MG tablet Take 1 tablet (150 mg) by mouth 2 times daily 60 tablet 5     saccharomyces boulardii (FLORASTOR) 250 MG capsule Take 250 mg by mouth daily       senna-docusate (SENOKOT-S;PERICOLACE) 8.6-50 MG per tablet Take 2 tablets by mouth At Bedtime Hold if diarrhea occurs. 120 tablet 11       Past Medical History:   Diagnosis Date     Adjustment disorder with anxious mood 5/18/2015     Advanced directives, counseling/discussion 8/30/2012    Patient states has Advance Directive and will bring in a copy to clinic. 8/30/2012   Tevin May  [facility] Medical Assistant \       Anemia 9/25/2015     Basal  cell cancer      CHF (congestive heart failure) (H) 9/18/2014     CKD (chronic kidney disease) stage 3, GFR 30-59 ml/min (H) 9/29/2015     DDD (degenerative disc disease), lumbar 9/25/2015     Diffuse idiopathic pulmonary fibrosis (H) 5/6/2013     Encounter for palliative care 5/18/2015     History of blood transfusion 9/29/2015     Hypertension goal BP (blood pressure) < 140/90 9/7/2012     Hypothyroid 9/7/2012     Irritable bowel syndrome 10/29/2013     Macular degeneration      Macular degeneration, 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     Nondisplaced spiral fracture of shaft of humerus      Osteoporosis 8/13/2013     [employer] Update utility     RA (rheumatoid arthritis) (H) 5/7/2013     Rheumatic fever      Shingles      Spinal stenosis of lumbar region with neurogenic claudication 9/14/2015       Past Surgical History:   Procedure Laterality Date     ANESTHESIA CARDIOVERSION N/A 9/14/2018    Procedure: ANESTHESIA CARDIOVERSION;;  Surgeon: GENERIC ANESTHESIA PROVIDER;  Location: [facility] OR     ANESTHESIA CARDIOVERSION N/A 10/11/2018    Procedure: ANESTHESIA CARDIOVERSION;  Cardioversion;  Surgeon: GENERIC ANESTHESIA PROVIDER;  Location: [facility] OR     APPENDECTOMY       BIOPSY      hemorrhoidectomy     ENT SURGERY      tonsillectomy     GYN SURGERY      3 D & C's     HYSTERECTOMY, PAP NO LONGER INDICATED       LAMINECTOMY LUMBAR ONE LEVEL N/A 10/13/2015    Procedure: LAMINECTOMY LUMBAR ONE LEVEL;  Surgeon: Fransico Toussaint MD;  Location: [facility] OR       Family History   Problem Relation Age of Onset     Hypertension Mother      Psychotic Disorder Father      Diabetes Son      Diabetes Daughter      Blood Disease Daughter        Social History   Substance Use Topics     Smoking status: Never Smoker     Smokeless tobacco: Never Used     Alcohol use Yes      Comment: rare wine        Allergies   Allergen Reactions     Cephalexin Hcl Diarrhea     Gabapentin Other (See Comments)     Dizzsiness     Naproxen GI Disturbance     Perfume      Macrobid  "[Nitrofurantoin Anhydrous]      Possibly related to lung disease      Seasonal Allergies      Sulfa Drugs      Throat swelling     Xanax [Alprazolam] Other (See Comments)     Dizziness      Ciprofloxacin Itching and Rash         ROS:   A complete review of systems was performed and is negative except as noted in the HPI.     Physical Examination:  Vitals: /82 (BP Location: Left arm, Patient Position: Chair, Cuff Size: Adult Regular)  Pulse 62  Ht 1.626 m (5' 4\")  LMP  (LMP Unknown)  SpO2 100%  BMI 27.05 kg/m2  BMI= Body mass index is 27.05 kg/(m^2).    GENERAL APPEARANCE: healthy, alert, and no acute respiratory distress  HEENT: no icterus, no xanthelasmas, normal pupil size and reaction, no cyanosis.  NECK: no asymmetry, no cervical bruits. JVD   RESPIRATORY: lungs clear to auscultation - no rales, rhonchi or wheezes, no use of accessory muscles, no retractions, respirations are unlabored, normal respiratory rate  CARDIOVASCULAR: irregular rhythm, normal S1 with physiologic split S2, no S3 or S4 and no murmur, click or rub  GI:  no abdominal bruits, soft, non-tender  EXTREMITIES: no clubbing  NEURO: alert and oriented to person/place/time, normal speech, gait and affect  VASC: Radial and posterior tibialis pulses +2 and symmetric bilaterally. No cyanosis.  Trace bilateral pedal edema.   SKIN: no ecchymoses, no rashes    Assessment and recommendations:    #1. Paroxysmal atrial fibrillation/atrial flutter: Discussed in detail with the patient management/treatment options for AF includin. Stroke Prophylaxis:  CHADSVASC=HF+, age++, female+, HTN+  5, corresponding to a 6.7% annual stroke / systemic emolism event rate. indicating need for long term oral anticoagulation. She is on renal dose of apixaban and has not had bleeding problems. Continue 2.5 mg twice daily.   2. Rate Control: Metoprolol XL 25 mg daily.    3. Rhythm Control: Ablation is not recommended due to her advanced age. She has limited AAT " options given variable renal function (prefer to avoid Sotalol or dofetilide) and propafenone failed to maintain SR.  She was started on amiodarone 9/19/2018 and underwent a successful DCCV 10/11/2018. She felt better for a couple of days and is now back in AFL with a return of significant fatigue, shortness of breath, and palpitations.  Will repeat DCCV now that she has been on the amiodarone for a longer duration.  She has not missed any doses of apixaban so no YOLANDA is necessary. Discussed risks/benefits of cardioversion with patient and she would like to proceed.     #2. HFpEF:  NYHA CLASS III: Fluid status mild hypervolemia   1. Aldosterone antagonist: Contraindicated due to renal function.   2. Diuretic for volume management: Continue furosemide 20 mg twice daily.   3. No evidence to support use of ACEi/ARB, BB, or SCD prophylaxis (normal EF) in HFpEF   4. NSAID use: Contraindicated, reviewed with patient   5. BP: controlled   6. Maintain rate control or rhythm control in atrial fibrillation.  Patient has diastolic dysfunction (HFpEF) and LV filling in HFpEF occurs largely in late diastole and is therefore more dependent than normal hearts on atrial contraction. Tachycardia is also deleterious by shortening the time of diastole. For these reasons, restoration and maintenance of sinus rhythm are preferred when AF/AFL occurs in patients with HFpEF. When this cannot be achieved (as it often happens due to changes in the atria), rate control becomes important. For these reasons, I would like the patient to continue amiodarone and repeat DCCV.   7. Recommend a low sodium diet (less than 2 grams/day)   8. Take daily weights and notify clinic if a weight gain of more than 3 lbs in a day or 5 lbs in a week.     FOLLOW UP: Keep scheduled follow up with Dr. Carrasquillo 11/7 or follow up sooner if needed for problems or symptoms.     Patient expresses understanding and agreement with the plan.    I appreciate the chance to help  with Linda Spicer's care. Please let me know if you have any questions or concerns.    Minna MOELLER, CNP

## 2018-10-15 NOTE — TELEPHONE ENCOUNTER
Reason for Call:  Other Home Care    Detailed comments: Pt's RN calling to report medication interactions that she would like to discuss as soon as possible.    Phone Number [facility]  RN can be reached at: Other phone number:  [phone]    Best Time: anytime    Can we leave a detailed message on this number? YES    Call taken on 10/15/2018 at 3:16 PM by Siva Swanson

## 2018-10-15 NOTE — TELEPHONE ENCOUNTER
Patient's recent hospitalization for CHF exacerbation is mainly due to increased fluid retention after her diuretics were switched from Bumex tp hydrochlorothiazide.     Continue Zantac, Remeron and Metoprolol at the same doses.

## 2018-10-15 NOTE — TELEPHONE ENCOUNTER
ED / Discharge Outreach Protocol    Patient Contact    Attempt # 1    Was call answered?  No.  Left message that RN will call back another time.  Regla Escalona RN

## 2018-10-15 NOTE — PROGRESS NOTES
Clinic Care Coordination Contact    Clinic Care Coordination Contact  OUTREACH    Referral Information:  Referral Source: Self-patient/Caregiver    Primary Diagnosis: CHF    Chief Complaint   Patient presents with     Clinic Care Coordination - [facility]     RN        Universal Utilization: Patient is followed by rheumatology, cardiology, endocrinology and pulmonology.   Clinic Utilization  Difficulty keeping appointments:: No  Compliance Concerns: No  No-Show Concerns: No  No PCP office visit in Past Year: No  Utilization    Last refreshed: 10/15/2018 12:56 PM:  No Show Count (past year) 1       Last refreshed: 10/15/2018 12:56 PM:  ED visits 0       Last refreshed: 10/15/2018 12:56 PM:  Hospital admissions 1          Current as of: 10/15/2018 12:56 PM             Clinical Concerns:  Current Medical Concerns:  Patient was inpatient at [facility] 10/8/18-10/12/18 for Acute on chronic heart failure exacerbation,   Atrial fibrillation/flutter, Acute on chronic kidney disease  Hypervolemic hyponatremia, Hypertension, UTI.  Patient was seen again today by cardiology due to return of symptoms.  Patient will have a cardioversion tomorrow morning.  Patient continues to receive home care services.  Patient is following cardiology closely.    Patient Active Problem List   Diagnosis     Hypertension goal BP (blood pressure) < 150/90     Hypothyroidism     Macular degeneration, left eye     Irritable bowel syndrome     Encounter for palliative care     Adjustment disorder with anxious mood     Mild anemia     DDD (degenerative disc disease), lumbar     CKD (chronic kidney disease) stage 3, GFR 30-59 ml/min (H)     History of blood transfusion     Aftercare following surgery     S/P lumbar laminectomy     High risk medication use     Age-related osteoporosis without current pathological fracture     Atrophic vaginitis     Fecal incontinence     Female stress incontinence     Impingement syndrome of both shoulders     UIP (usual  interstitial pneumonitis) (H)     High risk medications (not anticoagulants) long-term use     Heart failure with preserved ejection fraction (H)     Congestive heart failure with preserved LV function, NYHA class 3 (H)     Other specified hypothyroidism     Rheumatoid arthritis involving multiple sites with positive rheumatoid factor (H)     Health Care Home     Sick sinus syndrome (H)     Cardiac pacemaker - Medtronic dual lead pacemaker - Not Dependent - MRI Safe     Immunosuppression (H)     ILD (interstitial lung disease) (H)     Heart failure with preserved ejection fraction, NYHA class I (H)     Atrial flutter (H)     Paroxysmal atrial fibrillation (H)     CHF exacerbation (H)      Current Behavioral Concerns: n/a      Education Provided to patient: RN CC reviewed hospital discharge instructions.     Pain  Pain (GOAL):: No  Health Maintenance Reviewed: Due/Overdue   Health Maintenance Due   Topic Date Due     MICROALBUMIN Q1 YEAR  07/26/2018      Clinical Pathway: None    Medication Management:  Home care and family assist with medication management.   Patient states she needs a refill of Imuran.  RN CC noted there was a prescription on file from 5/8/18 with 90 tablets with 2 refills.  RN CC contacted [facility] pharmacy and was informed patient continued to have refills on file from 5/8/18 and they will fill this for patient.     Functional Status:  Dependent ADLs:: Ambulation-no assistive device, Bathing  Dependent IADLs:: Cooking, Cleaning, Laundry, Shopping, Meal Preparation, Medication Management, Money Management, Transportation  Bed or wheelchair confined:: No  Mobility Status: Independent    Living Situation:  Current living arrangement:: I live in a private home with spouse  Type of residence:: Apartment    Diet/Exercise/Sleep:  Diet:: Low cholesterol, Low saturated fat, No added salt  Inadequate nutrition (GOAL):: No  Food Insecurity: No  Tube Feeding: No  Exercise:: Currently not  exercising  Inadequate activity/exercise (GOAL):: No  Significant changes in sleep pattern (GOAL): No    Transportation:  Transportation concerns (GOAL):: No  Transportation means:: Family, [place] mobility     Psychosocial:  Sikh or spiritual beliefs that impact treatment:: No  Mental health DX:: No  Mental health management concern (GOAL):: No  Informal Support system:: Stacey based, Family, Friends, Neighbors, Spouse     Financial/Insurance:   Financial/Insurance concerns (GOAL):: No       Resources and Interventions:  Current Resources:   List of home care services:: Skilled Nursing, Home Health Aid, Physicial Therapy;   Community Resources: Home Care  Supplies used at home:: None  Equipment Currently Used at Home: raised toilet, shower chair, walker, rolling, wheelchair, power    Advance Care Plan/Directive  Advanced Care Plans/Directives on file:: In process  Advanced Care Plan/Directive Status: In Process  Patient has not followed through on completing health care directive.  May be more apt to completing it once her health is stable.    Referrals Placed: None     Goals:   Goals        General    #1 I will complete my health care directive. (pt-stated)     Notes - Note edited  8/21/2018 10:39 AM by Behl, Melissa K, RN    Goal Statement: I will complete my health care directive.  Measure of Success: Health care directive will be completed and scanned into chart.  Supportive Steps to Achieve: Patient has attended a health care directive class, 10/24/17 informed of CPR vs intubation  Barriers: is awaiting to see her  to finalize document  Strengths: has care coordination, home care and excellent family support  Date to Achieve By: 12/31/18  Patient expressed understanding of goal: yes                     Patient/Caregiver understanding: Patient has fair understanding of her current plan of care, but needs ongoing home care and care coordination due to her ongoing complex medical diagnoses.    Outreach  Frequency: monthly  Future Appointments              Tomorrow UU LAB GOLD WAITING [facility], Lab, UNIVERSITY O    Tomorrow U2A ROOM 15 Unit 2A [facility] [place], [place] O    Tomorrow UUETEER1 [facility],  Echocardiography, [place] O    In 2 days LAB FIRST FLOOR [facility], [place]    In 2 days Nelida Patel, [facility] MTM, EC    In 3 days Priscilla Agrawal PA-C [facility] [place], UMP PSA CLIN    In 1 week [place] 8 INFUSION [facility], [place]    In 3 weeks [facility] LAB [facility]    In 3 weeks UC PFL A [facility] Pulmonary Function Testing, [email or web address]    In 3 weeks 1, Uc Cv Device [facility], [facility] [[email or web address]    In 3 weeks Franki Carrasquillo MD [facility], [facility] [[email or web address]    In 3 weeks [place] 9 INFUSION [facility], [place]    In 1 month PFT LAB [facility]    In 1 month Dea Acosta MD [facility]    In 3 months [facility]    In 4 months Emeterio Loza MD [facility]    In 4 months Asai Steven PA-C [facility]          Plan:   1. Patient will have cardioversion 10/16/18 as scheduled.  2. Patient will  Imuran prescription refill.  3. Patient will continue to try and complete her health care directive.  4. RN CC will follow up with patient in 2-4 weeks, as patient remains in home care.    Melissa Behl BSN, RN, N  [facility] Care Coordinator  [phone]

## 2018-10-15 NOTE — LETTER
10/15/2018      RE: Linda Spicer  [address]  [place] [address]       Dear Colleague,    Thank you for the opportunity to participate in the care of your patient, Linda Spicer, at the [facility] HEART [facility]. Please see a copy of my visit note below.        Heart Care - Clinical Cardiac Electrophysiology       HPI: Linda Spicer is an 87 year old female who presents with her son for follow up of PAF/AFL and dizziness.  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 She is highly symptomatic with PAF/AFL.  She had been on propafenone since 2017 with continued recurrences so it was discontinued. She has not been a good candidate for AF ablation due to her high risk of stroke or bleed r/t her advanced age. Multaq was going to be tried, but was cost prohibitive as it is not covered by her insurance. She has limited AAT options given variable renal function (prefer to avoid Sotalol or dofetilide), therefore, an amiodarone load was initiated on 9/19/2018.  She was hospitalized for a HF exacerbation last week and was diuresed and underwent a successful DCCV on 10/11/2018.     Reviewed current interval:  - Echocardiogram normal LV function with EF 55-60%, mild pulmonary hypertension, and mild-moderate TI   - Current cardiac medications: Amiodarone 200 mg daily, amlodipine 5 mg daily, apixaban 2.5 mg twice daily, furosemide 20 mg twice daily, metoprolol Xl 25 mg daily   - Presenting EKG personally reviewed tracings: atrial flutter, Vent rate 60, NJ interval NA ms, QRS duration 158 ms, QTc 490 ms.    Current Interval history:   States that she felt better after her cardioversion last Thursday but yesterday morning (Sunday) she noted increased fatigue,  "palpitations \"hearing\" her heartbeat, and shortness of breath again that feels like her AF.  Patient states that she never misses doses of medication. States that she was 154 lbs this morning, down from 165 last week in the hospital. Denies recent weight gain or salt intake.  Denies chest pain or pressure, syncope, dyspnea at rest, orthopnea, PND, abdominal edema, pedal edema, or claudication.  Denies easy bruising or bleeding, hematuria, hematochezia, and epistaxis. Denies signs/symptoms of stroke such as visual disturbance, difficulty speaking, facial drooping, confusion, problems with gait, or any new numbness or weakness.    Current Outpatient Prescriptions   Medication Sig Dispense Refill     ACETAMINOPHEN PO Take 1,000 mg by mouth 2 times daily as needed        albuterol (2.5 MG/3ML) 0.083% neb solution Take 1 vial by nebulization every 6 hours as needed for shortness of breath / dyspnea or wheezing       amiodarone (PACERONE/CODARONE) 200 MG tablet 1st week: 400mg twice a day. 2nd week: 200mg twice a day. Then 200mg daily 120 tablet 3     amLODIPine (NORVASC) 5 MG tablet Take 1 tablet (5 mg) by mouth daily 90 tablet 3     apixaban ANTICOAGULANT (ELIQUIS) 2.5 MG tablet Take 1 tablet (2.5 mg) by mouth 2 times daily 180 tablet 3     Artificial Tear Ointment (REFRESH P.M.) OINT Apply  to eye. Daily at bedtime          Ascorbic Acid (VITAMIN C PO) Take 500 mg by mouth daily        azaTHIOprine (IMURAN) 50 MG tablet Take 1 tablet (50 mg) by mouth daily 90 tablet 2     Blood Pressure Monitor KIT 1 kit daily as needed 1 kit 0     calcium-vitamin D (CALTRATE) 600-400 MG-UNIT per tablet Take 1 tablet by mouth 2 times daily        cefdinir (OMNICEF) 300 MG capsule Take 1 capsule (300 mg) by mouth daily for 6 days 6 capsule 0     cetirizine (ZYRTEC ALLERGY) 10 MG tablet Take 10 mg by mouth At Bedtime       clotrimazole (LOTRIMIN) 1 % cream Place 1 Applicatorful vaginally daily 50 g 1     COMPOUNDED NON-CONTROLLED " SUBSTANCE (CMPD RX) - PHARMACY TO MIX COMPOUNDED MEDICATION Estriol 1mg/gram. Place 1 gram vaginally daily for two weeks, then vaginally twice weekly after. 30 g 6     fish oil-omega-3 fatty acids (FISH OIL) 1000 MG capsule Take 2 g by mouth daily. 2 capsules daily            folic acid (FOLVITE) 1 MG tablet Take 1 tablet (1 mg) by mouth daily 90 tablet 3     furosemide (LASIX) 40 MG tablet Take 0.5 tablets (20 mg) by mouth 2 times daily 60 tablet 3     hydrocortisone 2.5 % cream Apply BID to affected region(s) for 7-10 days. 30 g 0     Hypromellose (GENTEAL MILD OP) Apply  to eye daily.       levothyroxine (SYNTHROID/LEVOTHROID) 75 MCG tablet TAKE ONE TABLET BY MOUTH ONCE DAILY 90 tablet 1     loratadine (CLARITIN) 10 MG tablet Take 10 mg by mouth every morning       metoprolol succinate (TOPROL-XL) 25 MG 24 hr tablet Take 1 tablet (25 mg) by mouth daily 90 tablet 3     mirtazapine (REMERON) 15 MG tablet Take 1 tablet (15 mg) by mouth At Bedtime 30 tablet 1     Multiple Vitamins-Minerals (PRESERVISION AREDS PO) Take 1 tablet by mouth daily        nitroglycerin (NITROSTAT) 0.4 MG SL tablet Place 1 tablet (0.4 mg) under the tongue every 5 minutes as needed for chest pain 25 tablet 0     order for DME Dispense SP Magdy 1 each 0     order for DME Equipment being ordered: Dispense baffle, for use with nebulizer. 1 each 0     order for DME Equipment being ordered: Nebulizer. Use with Albuterol. 1 each 0     order for DME Equipment being ordered: Dispense face mask.  Mrs. Spicer is immunosuppressed due to rheumatoid arthritis. 1 [address] 11     ranibizumab (LUCENTIS) 0.3 MG/0.05ML SOLN 0.3 mg by Intravitreal route Every 6 weeks       ranitidine (ZANTAC) 150 MG tablet Take 1 tablet (150 mg) by mouth 2 times daily 60 tablet 5     saccharomyces boulardii (FLORASTOR) 250 MG capsule Take 250 mg by mouth daily       senna-docusate (SENOKOT-S;PERICOLACE) 8.6-50 MG per tablet Take 2 tablets by mouth At Bedtime Hold if diarrhea  occurs. 120 tablet 11       Past Medical History:   Diagnosis Date     Adjustment disorder with anxious mood 5/18/2015     Advanced directives, counseling/discussion 8/30/2012    Patient states has Advance Directive and will bring in a copy to clinic. 8/30/2012   Tevin May  [facility] Medical Assistant \       Anemia 9/25/2015     Basal cell cancer      CHF (congestive heart failure) (H) 9/18/2014     CKD (chronic kidney disease) stage 3, GFR 30-59 ml/min (H) 9/29/2015     DDD (degenerative disc disease), lumbar 9/25/2015     Diffuse idiopathic pulmonary fibrosis (H) 5/6/2013     Encounter for palliative care 5/18/2015     History of blood transfusion 9/29/2015     Hypertension goal BP (blood pressure) < 140/90 9/7/2012     Hypothyroid 9/7/2012     Irritable bowel syndrome 10/29/2013     Macular degeneration      Macular degeneration, 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     Nondisplaced spiral fracture of shaft of humerus      Osteoporosis 8/13/2013     Imo Update utility     RA (rheumatoid arthritis) (H) 5/7/2013     Rheumatic fever      Shingles      Spinal stenosis of lumbar region with neurogenic claudication 9/14/2015       Past Surgical History:   Procedure Laterality Date     ANESTHESIA CARDIOVERSION N/A 9/14/2018    Procedure: ANESTHESIA CARDIOVERSION;;  Surgeon: GENERIC ANESTHESIA PROVIDER;  Location: [facility] OR     ANESTHESIA CARDIOVERSION N/A 10/11/2018    Procedure: ANESTHESIA CARDIOVERSION;  Cardioversion;  Surgeon: GENERIC ANESTHESIA PROVIDER;  Location: [facility] OR     APPENDECTOMY       BIOPSY      hemorrhoidectomy     ENT SURGERY      tonsillectomy     GYN SURGERY      3 D & C's     HYSTERECTOMY, PAP NO LONGER INDICATED       LAMINECTOMY LUMBAR ONE LEVEL N/A 10/13/2015    Procedure: LAMINECTOMY LUMBAR ONE LEVEL;  Surgeon: Fransico Toussaint MD;  Location: [facility] OR       Family History   Problem Relation Age of Onset     Hypertension Mother      Psychotic Disorder Father      Diabetes Son      Diabetes Daughter      Blood  "Disease Daughter        Social History   Substance Use Topics     Smoking status: Never Smoker     Smokeless tobacco: Never Used     Alcohol use Yes      Comment: rare wine        Allergies   Allergen Reactions     Cephalexin Hcl Diarrhea     Gabapentin Other (See Comments)     Dizzsiness     Naproxen GI Disturbance     Perfume      Macrobid [Nitrofurantoin Anhydrous]      Possibly related to lung disease      Seasonal Allergies      Sulfa Drugs      Throat swelling     Xanax [Alprazolam] Other (See Comments)     Dizziness      Ciprofloxacin Itching and Rash         ROS:   A complete review of systems was performed and is negative except as noted in the HPI.     Physical Examination:  Vitals: /82 (BP Location: Left arm, Patient Position: Chair, Cuff Size: Adult Regular)  Pulse 62  Ht 1.626 m (5' 4\")  LMP  (LMP Unknown)  SpO2 100%  BMI 27.05 kg/m2  BMI= Body mass index is 27.05 kg/(m^2).    GENERAL APPEARANCE: healthy, alert, and no acute respiratory distress  HEENT: no icterus, no xanthelasmas, normal pupil size and reaction, no cyanosis.  NECK: no asymmetry, no cervical bruits. JVD   RESPIRATORY: lungs clear to auscultation - no rales, rhonchi or wheezes, no use of accessory muscles, no retractions, respirations are unlabored, normal respiratory rate  CARDIOVASCULAR: irregular rhythm, normal S1 with physiologic split S2, no S3 or S4 and no murmur, click or rub  GI:  no abdominal bruits, soft, non-tender  EXTREMITIES: no clubbing  NEURO: alert and oriented to person/place/time, normal speech, gait and affect  VASC: Radial and posterior tibialis pulses +2 and symmetric bilaterally. No cyanosis.  Trace bilateral pedal edema.   SKIN: no ecchymoses, no rashes    Assessment and recommendations:    #1. Paroxysmal atrial fibrillation/atrial flutter: Discussed in detail with the patient management/treatment options for AF includin. Stroke Prophylaxis:  CHADSVASC=HF+, age++, female+, HTN+  5, corresponding " to a 6.7% annual stroke / systemic [facility] event rate. indicating need for long term oral anticoagulation. She is on renal dose of apixaban and has not had bleeding problems. Continue 2.5 mg twice daily.   2. Rate Control: Metoprolol XL 25 mg daily.    3. Rhythm Control: Ablation is not recommended due to her advanced age. She has limited AAT options given variable renal function (prefer to avoid Sotalol or dofetilide) and propafenone failed to maintain SR.  She was started on amiodarone 9/19/2018 and underwent a successful DCCV 10/11/2018. She felt better for a couple of days and is now back in AFL with a return of significant fatigue, shortness of breath, and palpitations.  Will repeat DCCV now that she has been on the amiodarone for a longer duration.  She has not missed any doses of apixaban so no YOLANDA is necessary. Discussed risks/benefits of cardioversion with patient and she would like to proceed.     #2. HFpEF:  NYHA CLASS III: Fluid status mild hypervolemia   1. Aldosterone antagonist: Contraindicated due to renal function.   2. Diuretic for volume management: Continue furosemide 20 mg twice daily.   3. No evidence to support use of ACEi/ARB, BB, or SCD prophylaxis (normal EF) in HFpEF   4. NSAID use: Contraindicated, reviewed with patient   5. BP: controlled   6. Maintain rate control or rhythm control in atrial fibrillation.  Patient has diastolic dysfunction (HFpEF) and LV filling in HFpEF occurs largely in late diastole and is therefore more dependent than normal hearts on atrial contraction. Tachycardia is also deleterious by shortening the time of diastole. For these reasons, restoration and maintenance of sinus rhythm are preferred when AF/AFL occurs in patients with HFpEF. When this cannot be achieved (as it often happens due to changes in the atria), rate control becomes important. For these reasons, I would like the patient to continue amiodarone and repeat DCCV.   7. Recommend a low sodium diet  (less than 2 grams/day)   8. Take daily weights and notify clinic if a weight gain of more than 3 lbs in a day or 5 lbs in a week.     FOLLOW UP: Keep scheduled follow up with Dr. Carrasquillo 11/7 or follow up sooner if needed for problems or symptoms.     Patient expresses understanding and agreement with the plan.    I appreciate the chance to help with Linda KATEY Spicer's care. Please let me know if you have any questions or concerns.    Minna MOELLER, CNP

## 2018-10-15 NOTE — MR AVS SNAPSHOT
After Visit Summary   10/15/2018    Linda Spicer    MRN: [id number]           Patient Information     Date Of Birth          6/13/1931        Visit Information        Provider Department      10/15/2018 10:00 AM Minna Gallagher APRN CNP [facility]        Today's Diagnoses     Paroxysmal atrial fibrillation (H)    -  1    Congestive heart failure with preserved LV function, NYHA class 3 (H)          Care Instructions    Cardiology Provider you saw in clinic today: Minna MOELLER, NP-C    Medication Changes:      Labs/Tests needed:       Follow-up Visit:      Further Instructions:      Recommend a low sodium diet (less than 2 grams/day)     Take daily weights and notify clinic if a weight gain of more than 3 lbs in a day or 5 lbs in a week.     You are scheduled for a Cardioversion at the [facility] ([address], [facility] [address], [phone]).       Follow these instructions:    1. Report to the GOLD waiting room in the [facility] on: October 15, 2018   at 7am.   Please arrive at the time listed on this letter and disregard any phone reminder times.     2. Please do not eat anything for 8 hours prior to your procedure. You may have sips of water up until 2 hours prior to your arrival.    3. The morning of your procedure you may take your scheduled medications with a SIP of water. If you take diabetic medications or a diuretic, you may hold these.     4. You will receive medication that makes you sleepy; you will need a  and someone to stay with you for 24 hours following this procedure.    You should not make any legal decisions for 24 hours following discharge.         If your question concerns the schedule/appointment times, contact:  RAAD Severino Procedure   [phone]          You will receive all normal lab and testing results via [employer] or mail if not reviewed in clinic today. Please contact our office if you need  assistance with setting up MyChart.    If you need a medication refill please contact your pharmacy. Please allow 3 business days for your refill to be completed.     As always, thank you for trusting us with your health care needs!    If you have any questions regarding your visit please contact your care team:   Cardiology  Telephone Number    EP RN  Electrophysiology Nurse Coordinator [phone]     Call for EP procedure scheduling concerns  Lakeshia Wood,   EP  [phone]           Device Clinic (Pacemakers, ICDs, Loop)   RN's : Saloni Cannon Connie, Dawn During business hours: [phone]    After business hours:   [phone]- select option 4 and ask for job code [id number].                  Follow-ups after your visit        Your next 10 appointments already scheduled     Oct 16, 2018  8:30 AM CDT   Cardioversion no [employer] with UUETEER1   [facility] [place],  Echocardiography ([facility], [facility])    [address]  [email or web address] [address] [phone]           1) NPO for 8 hours prior to the procedure except for sips of water with medications. 2) Hold insulin or oral diabetic medications morning of procedure. May take   dose long acting insulin. Follow further instructions of PMD. 3) Continue daily Coumadin. ~ If taking Digoxin, hold AM of procedure. ~Plan to have a responsible adult take you home after the exam. You may not drive, take a bus or taxi by yourself. A responsible adult must stay with you for 24 hours after the test.            Oct 17, 2018 10:10 AM CDT   LAB with LAB FIRST FLOOR [facility] ([facility])    [address] [place] [address] [phone]           Please do not eat 10-12 hours before your appointment if you are coming in fasting for labs on lipids, cholesterol, or glucose (sugar). This does not apply to pregnant women. Water, hot tea and black coffee (with nothing added) are  okay. Do not drink other fluids, diet soda or chew gum.            Oct 18, 2018 10:00 AM CDT   CORE Enrollment with Priscilla Agrawal PA-C   [facility] PHYSICIANS HEART AT [facility] ([facility])    [address] 2nd [address]  [place] [address]   [phone]            Oct 24, 2018  9:00 AM CDT   Level 1 with [place] 8 INFUSION   [facility] ([facility])    [address] [place] [address] [phone]            Nov 07, 2018 11:30 AM CST   Lab with UC LAB   [facility] ([facility])    [address]  1st Floor  [facility] [address] [phone]            Nov 07, 2018 12:00 PM CST   FULL PULMONARY FUNCTION with UC PFL A   [facility] Pulmonary Function Testing ([facility])    [address]  3rd Floor  [facility] [id number] [phone]            Nov 07, 2018  1:30 PM CST   (Arrive by 1:15 PM)   Pacemaker Check with Uc Cv Device 1   [facility] ([facility])    [address]  3rd Floor  [address]               Nov 07, 2018  2:00 PM CST   (Arrive by 1:45 PM)   RETURN ARRHYTHMIA with Franki Carrasquillo MD   [facility] ([facility])    [address] [place] [address]  [address]  [facility] [address] [phone]            Nov 08, 2018  9:30 AM CST   Level 1 with [place] 9 INFUSION   [facility] ([facility])    [address] [place] [address] [phone]            Dec 11, 2018 10:00 AM CST   Office Visit with PFT LAB   [facility] ([facility])    [address] [place] [address] [phone]           Bring a current list of meds and any records pertaining to this visit. For Physicals, please bring immunization records and any forms needing to be filled out. Please arrive 10 minutes early to  "complete paperwork.              Future tests that were ordered for you today     Open Future Orders        Priority Expected Expires Ordered    Cardioversion Routine  10/15/2019 10/15/2018    Cardioversion Routine 10/22/2018 10/15/2019 10/15/2018            Who to contact     If you have questions or need follow up information about today's clinic visit or your schedule please contact [facility] directly at [phone].  Normal or non-critical lab and imaging results will be communicated to you by [employer], letter or phone within 4 business days after the clinic has received the results. If you do not hear from us within 7 days, please contact the clinic through [employer] or phone. If you have a critical or abnormal lab result, we will notify you by phone as soon as possible.  Submit refill requests through [employer] or call your pharmacy and they will forward the refill request to us. Please allow 3 business days for your refill to be completed.          Additional Information About Your Visit        Channel [employer] Information     [employer] gives you secure access to your electronic health record. If you see a primary care provider, you can also send messages to your care team and make appointments. If you have questions, please call your primary care clinic.  If you do not have a primary care provider, please call [phone] and they will assist you.        Care EveryWhere ID     This is your Care EveryWhere ID. This could be used by other organizations to access your [place] medical records  [id number]        Your Vitals Were     Pulse Height Last Period Pulse Oximetry BMI (Body Mass Index)       62 1.626 m (5' 4\") (LMP Unknown) 100% 27.05 kg/m2        Blood Pressure from Last 3 Encounters:   10/15/18 143/82   10/12/18 146/56   10/08/18 149/64    Weight from Last 3 Encounters:   10/11/18 71.5 kg (157 lb 9.6 oz)   10/08/18 75.1 kg (165 lb 8 oz)   09/19/18 70.8 kg (156 lb)              We Performed the Following  "    EKG 12-lead, tracing only (Same Day)        Primary Care Provider Office Phone # Fax #    Tre Lockett [phone]       [address]  [facility] [address]        Goals        General    #1 I will complete my health care directive. (pt-stated)     Notes - Note edited  8/21/2018 10:39 AM by Behl, Melissa K, RN    Goal Statement: I will complete my health care directive.  Measure of Success: Health care directive will be completed and scanned into chart.  Supportive Steps to Achieve: Patient has attended a health care directive class, 10/24/17 informed of CPR vs intubation  Barriers: is awaiting to see her  to finalize document  Strengths: has care coordination, home care and excellent family support  Date to Achieve By: 12/31/18  Patient expressed understanding of goal: yes               Equal Access to Services     [facility]: Paul Goyal, waaxfidencio luqadaha, qaybta kaalmafidencio perez, zahraa moss . So [facility] [phone].    ATENCIÓN: Si habla español, tiene a merchant disposición servicios gratuitos de asistencia lingüística. Llame al [phone].    We comply with applicable federal civil rights laws and [place] laws. We do not discriminate on the basis of race, color, national origin, age, disability, sex, sexual orientation, or gender identity.            Thank you!     Thank you for choosing [facility]  for your care. Our goal is always to provide you with excellent care. Hearing back from our patients is one way we can continue to improve our services. Please take a few minutes to complete the written survey that you may receive in the mail after your visit with us. Thank you!             Your Updated Medication List - Protect others around you: Learn how to safely use, store and throw away your medicines at www.disposemymeds.org.          This list is accurate as of 10/15/18 11:31 AM.  Always use your most recent med  list.                   Brand Name Dispense Instructions for use Diagnosis    ACETAMINOPHEN PO      Take 1,000 mg by mouth 2 times daily as needed        albuterol (2.5 MG/3ML) 0.083% neb solution      Take 1 vial by nebulization every 6 hours as needed for shortness of breath / dyspnea or wheezing        amiodarone 200 MG tablet    PACERONE/CODARONE    120 tablet    1st week: 400mg twice a day. 2nd week: 200mg twice a day. Then 200mg daily    Paroxysmal atrial fibrillation (H)       amLODIPine 5 MG tablet    NORVASC    90 tablet    Take 1 tablet (5 mg) by mouth daily    Hypertension goal BP (blood pressure) < 150/90       apixaban ANTICOAGULANT 2.5 MG tablet    ELIQUIS    180 tablet    Take 1 tablet (2.5 mg) by mouth 2 times daily    Atrial flutter, paroxysmal (H)       azaTHIOprine 50 MG tablet    IMURAN    90 tablet    Take 1 tablet (50 mg) by mouth daily    Rheumatoid arthritis involving multiple sites with positive rheumatoid factor (H)       Blood Pressure Monitor Kit     1 kit    1 kit daily as needed    CHF (congestive heart failure) (H)       calcium carbonate 600 mg-vitamin D 400 units 600-400 MG-UNIT per tablet    CALTRATE     Take 1 tablet by mouth 2 times daily        cefdinir 300 MG capsule    OMNICEF    6 capsule    Take 1 capsule (300 mg) by mouth daily for 6 days    Acute cystitis without hematuria       clotrimazole 1 % cream    LOTRIMIN    50 g    Place 1 Applicatorful vaginally daily    Vaginitis and vulvovaginitis       COMPOUNDED NON-CONTROLLED SUBSTANCE - PHARMACY TO MIX COMPOUNDED MEDICATION    CMPD RX    30 g    Estriol 1mg/gram. Place 1 gram vaginally daily for two weeks, then vaginally twice weekly after.    Atrophic vaginitis       fish oil-omega-3 fatty acids 1000 MG capsule      Take 2 g by mouth daily. 2 capsules daily        folic acid 1 MG tablet    FOLVITE    90 tablet    Take 1 tablet (1 mg) by mouth daily    Oral aphthae       furosemide 40 MG tablet    LASIX    60 tablet    Take  0.5 tablets (20 mg) by mouth 2 times daily    Heart failure with preserved ejection fraction, NYHA class I (H)       GENTEAL MILD OP      Apply  to eye daily.        hydrocortisone 2.5 % cream     30 g    Apply BID to affected region(s) for 7-10 days.    Rash       levothyroxine 75 MCG tablet    SYNTHROID/LEVOTHROID    90 tablet    TAKE ONE TABLET BY MOUTH ONCE DAILY    Hypothyroidism, unspecified type       loratadine 10 MG tablet    CLARITIN     Take 10 mg by mouth every morning        metoprolol succinate 25 MG 24 hr tablet    TOPROL-XL    90 tablet    Take 1 tablet (25 mg) by mouth daily    Hypertension goal BP (blood pressure) < 140/90       mirtazapine 15 MG tablet    REMERON    30 tablet    Take 1 tablet (15 mg) by mouth At Bedtime    Adjustment insomnia       nitroGLYcerin 0.4 MG sublingual tablet    NITROSTAT    25 tablet    Place 1 tablet (0.4 mg) under the tongue every 5 minutes as needed for chest pain    Chest pain       * order for DME     1 Box    Equipment being ordered: Dispense face mask. Mrs. Spicer is immunosuppressed due to rheumatoid arthritis.    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 Use with Albuterol.    Hypoxia       order for DME     1 each    Equipment being ordered: Dispense baffle, for use with nebulizer.    Pneumonia of left upper lobe due to Mycoplasma pneumoniae       * order for DME     1 each    Dispense SP Walker-small    Pain of toe of right foot, Status post bunionectomy       PRESERVISION AREDS PO      Take 1 tablet by mouth daily        ranibizumab 0.3 MG/0.05ML Soln    LUCENTIS     0.3 mg by Intravitreal route Every 6 weeks        ranitidine 150 MG tablet    ZANTAC    60 tablet    Take 1 tablet (150 mg) by mouth 2 times daily    Gastroesophageal reflux disease without esophagitis       REFRESH P.M. Oint      Apply  to eye. Daily at bedtime        saccharomyces  boulardii 250 MG capsule    FLORASTOR     Take 250 mg by mouth daily        senna-docusate 8.6-50 MG per tablet    SENOKOT-S;PERICOLACE    120 tablet    Take 2 tablets by mouth At Bedtime Hold if diarrhea occurs.    Constipation, chronic       VITAMIN C PO      Take 500 mg by mouth daily        ZYRTEC ALLERGY 10 MG tablet   Generic drug:  cetirizine      Take 10 mg by mouth At Bedtime        * Notice:  This list has 3 medication(s) that are the same as other medications prescribed for you. Read the directions carefully, and ask your doctor or other care provider to review them with you.

## 2018-10-15 NOTE — TELEPHONE ENCOUNTER
Spoke with home care Nurse. Home Care RN has concerns about medication interactions in patient's medication profile.  Home care RN reports that she noted that Metoprolol interacts with Zantac.   Home care RN notes that Remeron medication has interactions with CHF patients.  Home care RN wanted to update Dr. Lockett that patient was hospitalized for Afib and CHF exacerbation 10/8/18-10/12/18. Patient was admitted again today and is having cardio version done tomorrow.  Provider please review and advise.  Regla Escalona RN

## 2018-10-15 NOTE — PROGRESS NOTES
Clinic Care Coordination Contact  [facility]/Voicemail       Clinical Data: Care Coordinator Outreach  Outreach attempted x 1.  Left message on voicemail with call back information and requested return call.  Plan: Care Coordinator mailed out care coordination introduction letter on 4/6/16. Care Coordinator will try to reach patient again in 1-2 business days.    Melissa Behl BSN, RN, [facility] Care Coordinator  [phone]

## 2018-10-16 ENCOUNTER — ALLIED HEALTH/NURSE VISIT (OUTPATIENT)
Dept: CARDIOLOGY | Facility: CLINIC | Age: 83
End: 2018-10-16
Attending: INTERNAL MEDICINE
Payer: MEDICARE

## 2018-10-16 ENCOUNTER — HOSPITAL ENCOUNTER (OUTPATIENT)
Dept: CARDIOLOGY | Facility: CLINIC | Age: 83
End: 2018-10-16
Attending: NURSE PRACTITIONER
Payer: MEDICARE

## 2018-10-16 ENCOUNTER — HOSPITAL ENCOUNTER (OUTPATIENT)
Facility: CLINIC | Age: 83
Discharge: HOME | End: 2018-10-16
Attending: INTERNAL MEDICINE | Admitting: INTERNAL MEDICINE
Payer: MEDICARE

## 2018-10-16 ENCOUNTER — ANESTHESIA (OUTPATIENT)
Dept: SURGERY | Facility: CLINIC | Age: 83
End: 2018-10-16
Payer: MEDICARE

## 2018-10-16 ENCOUNTER — SURGERY (OUTPATIENT)
Age: 83
End: 2018-10-16

## 2018-10-16 ENCOUNTER — APPOINTMENT (OUTPATIENT)
Dept: MEDSURG UNIT | Facility: CLINIC | Age: 83
End: 2018-10-16
Payer: MEDICARE

## 2018-10-16 VITALS
TEMPERATURE: 98.1 F | DIASTOLIC BLOOD PRESSURE: 50 MMHG | HEIGHT: 64 IN | WEIGHT: 154 LBS | RESPIRATION RATE: 16 BRPM | OXYGEN SATURATION: 95 % | BODY MASS INDEX: 26.29 KG/M2 | SYSTOLIC BLOOD PRESSURE: 121 MMHG | HEART RATE: 62 BPM

## 2018-10-16 DIAGNOSIS — I50.30 HEART FAILURE WITH PRESERVED EJECTION FRACTION (H): ICD-10-CM

## 2018-10-16 DIAGNOSIS — I48.0 PAROXYSMAL ATRIAL FIBRILLATION (H): ICD-10-CM

## 2018-10-16 DIAGNOSIS — I48.19 PERSISTENT ATRIAL FIBRILLATION (H): ICD-10-CM

## 2018-10-16 DIAGNOSIS — I49.5 SICK SINUS SYNDROME (H): Primary | ICD-10-CM

## 2018-10-16 LAB
ANION GAP SERPL CALCULATED.3IONS-SCNC: 9 MMOL/L (ref 3–14)
BUN SERPL-MCNC: 47 MG/DL (ref 7–30)
CALCIUM SERPL-MCNC: 8.6 MG/DL (ref 8.5–10.1)
CHLORIDE SERPL-SCNC: 103 MMOL/L (ref 94–109)
CO2 SERPL-SCNC: 25 MMOL/L (ref 20–32)
CREAT SERPL-MCNC: 1.55 MG/DL (ref 0.52–1.04)
GFR SERPL CREATININE-BSD FRML MDRD: 32 ML/MIN/1.7M2
GLUCOSE SERPL-MCNC: 98 MG/DL (ref 70–99)
INR PPP: 1.32 (ref 0.86–1.14)
INTERPRETATION ECG - MUSE: NORMAL
INTERPRETATION ECG - MUSE: NORMAL
MAGNESIUM SERPL-MCNC: 2.1 MG/DL (ref 1.6–2.3)
NT-PROBNP SERPL-MCNC: 3657 PG/ML (ref 0–450)
POTASSIUM SERPL-SCNC: 4.5 MMOL/L (ref 3.4–5.3)
SODIUM SERPL-SCNC: 137 MMOL/L (ref 133–144)

## 2018-10-16 PROCEDURE — 40000166 ZZH STATISTIC PP CARE STAGE 1

## 2018-10-16 PROCEDURE — 83880 ASSAY OF NATRIURETIC PEPTIDE: CPT | Performed by: PHYSICIAN ASSISTANT

## 2018-10-16 PROCEDURE — 93325 DOPPLER ECHO COLOR FLOW MAPG: CPT | Mod: 26 | Performed by: INTERNAL MEDICINE

## 2018-10-16 PROCEDURE — 40000065 ZZH STATISTIC EKG NON-CHARGEABLE

## 2018-10-16 PROCEDURE — 93320 DOPPLER ECHO COMPLETE: CPT | Mod: 26 | Performed by: INTERNAL MEDICINE

## 2018-10-16 PROCEDURE — 25000128 H RX IP 250 OP 636: Performed by: INTERNAL MEDICINE

## 2018-10-16 PROCEDURE — 80048 BASIC METABOLIC PNL TOTAL CA: CPT | Performed by: PHYSICIAN ASSISTANT

## 2018-10-16 PROCEDURE — 92960 CARDIOVERSION ELECTRIC EXT: CPT

## 2018-10-16 PROCEDURE — 25000125 ZZHC RX 250: Performed by: NURSE ANESTHETIST, CERTIFIED REGISTERED

## 2018-10-16 PROCEDURE — 93005 ELECTROCARDIOGRAM TRACING: CPT

## 2018-10-16 PROCEDURE — 99152 MOD SED SAME PHYS/QHP 5/>YRS: CPT

## 2018-10-16 PROCEDURE — 92960 CARDIOVERSION ELECTRIC EXT: CPT | Performed by: NURSE PRACTITIONER

## 2018-10-16 PROCEDURE — 93312 ECHO TRANSESOPHAGEAL: CPT | Mod: 26 | Performed by: INTERNAL MEDICINE

## 2018-10-16 PROCEDURE — 93280 PM DEVICE PROGR EVAL DUAL: CPT | Mod: ZF

## 2018-10-16 PROCEDURE — 25000125 ZZHC RX 250: Performed by: INTERNAL MEDICINE

## 2018-10-16 PROCEDURE — 85610 PROTHROMBIN TIME: CPT | Performed by: PHYSICIAN ASSISTANT

## 2018-10-16 PROCEDURE — 37000008 ZZH ANESTHESIA TECHNICAL FEE, 1ST 30 MIN

## 2018-10-16 PROCEDURE — 83735 ASSAY OF MAGNESIUM: CPT | Performed by: PHYSICIAN ASSISTANT

## 2018-10-16 PROCEDURE — 93280 PM DEVICE PROGR EVAL DUAL: CPT | Mod: 26 | Performed by: INTERNAL MEDICINE

## 2018-10-16 PROCEDURE — 36415 COLL VENOUS BLD VENIPUNCTURE: CPT | Performed by: PHYSICIAN ASSISTANT

## 2018-10-16 PROCEDURE — 93312 ECHO TRANSESOPHAGEAL: CPT

## 2018-10-16 RX ORDER — SODIUM CHLORIDE 9 MG/ML
1000 INJECTION, SOLUTION INTRAVENOUS CONTINUOUS
Status: DISCONTINUED | OUTPATIENT
Start: 2018-10-16 | End: 2018-10-16 | Stop reason: HOSPADM

## 2018-10-16 RX ORDER — POTASSIUM CHLORIDE 750 MG/1
40 TABLET, EXTENDED RELEASE ORAL
Status: DISCONTINUED | OUTPATIENT
Start: 2018-10-16 | End: 2018-10-16 | Stop reason: HOSPADM

## 2018-10-16 RX ORDER — ATROPINE SULFATE 0.1 MG/ML
.5-1 INJECTION INTRAVENOUS
Status: DISCONTINUED | OUTPATIENT
Start: 2018-10-16 | End: 2018-10-16 | Stop reason: HOSPADM

## 2018-10-16 RX ORDER — POTASSIUM CHLORIDE 750 MG/1
20 TABLET, EXTENDED RELEASE ORAL
Status: DISCONTINUED | OUTPATIENT
Start: 2018-10-16 | End: 2018-10-16 | Stop reason: HOSPADM

## 2018-10-16 RX ORDER — LIDOCAINE 40 MG/G
CREAM TOPICAL
Status: DISCONTINUED | OUTPATIENT
Start: 2018-10-16 | End: 2018-10-16 | Stop reason: HOSPADM

## 2018-10-16 RX ORDER — NALOXONE HYDROCHLORIDE 0.4 MG/ML
.1-.4 INJECTION, SOLUTION INTRAMUSCULAR; INTRAVENOUS; SUBCUTANEOUS
Status: DISCONTINUED | OUTPATIENT
Start: 2018-10-16 | End: 2018-10-16 | Stop reason: HOSPADM

## 2018-10-16 RX ORDER — FLUMAZENIL 0.1 MG/ML
0.2 INJECTION, SOLUTION INTRAVENOUS
Status: DISCONTINUED | OUTPATIENT
Start: 2018-10-16 | End: 2018-10-16 | Stop reason: HOSPADM

## 2018-10-16 RX ORDER — FENTANYL CITRATE 50 UG/ML
25 INJECTION, SOLUTION INTRAMUSCULAR; INTRAVENOUS
Status: DISCONTINUED | OUTPATIENT
Start: 2018-10-16 | End: 2018-10-16 | Stop reason: HOSPADM

## 2018-10-16 RX ORDER — FENTANYL CITRATE 50 UG/ML
25-50 INJECTION, SOLUTION INTRAMUSCULAR; INTRAVENOUS
Status: DISCONTINUED | OUTPATIENT
Start: 2018-10-16 | End: 2018-10-16 | Stop reason: HOSPADM

## 2018-10-16 RX ADMIN — MIDAZOLAM HYDROCHLORIDE 1 MG: 2 INJECTION, SOLUTION INTRAMUSCULAR; INTRAVENOUS at 09:30

## 2018-10-16 RX ADMIN — METHOHEXITAL SODIUM 30 MG: 500 INJECTION, POWDER, LYOPHILIZED, FOR SOLUTION INTRAMUSCULAR; INTRAVENOUS; RECTAL at 10:17

## 2018-10-16 RX ADMIN — LIDOCAINE HYDROCHLORIDE 30 ML: 20 SOLUTION ORAL; TOPICAL at 09:28

## 2018-10-16 RX ADMIN — TOPICAL ANESTHETIC 0.5 ML: 200 SPRAY DENTAL; PERIODONTAL at 09:28

## 2018-10-16 RX ADMIN — FENTANYL CITRATE 25 MCG: 50 INJECTION, SOLUTION INTRAMUSCULAR; INTRAVENOUS at 09:30

## 2018-10-16 ASSESSMENT — ENCOUNTER SYMPTOMS: DYSRHYTHMIAS: 1

## 2018-10-16 NOTE — ANESTHESIA POSTPROCEDURE EVALUATION
Patient: Linda Spicer    Procedure(s):  Anesthesia Cardioversion     Diagnosis:Atrial Fibrillation   Diagnosis Additional Information: No value filed.    Anesthesia Type:  General    Note:  Anesthesia Post Evaluation    Patient location during evaluation: Echo Lab  Patient participation: Able to fully participate in evaluation  Level of consciousness: awake and alert  Pain management: adequate  Airway patency: patent  Cardiovascular status: acceptable  Respiratory status: acceptable  Hydration status: acceptable  PONV: none     Anesthetic complications: None          Last vitals:  Vitals:    10/16/18 1100 10/16/18 1115 10/16/18 1131   BP: 124/52 116/49 121/50   Pulse: 60 60 62   Resp: 16 16 16   Temp:      SpO2: 96% 98% 95%         Electronically Signed By: Ángel Loaiza MD  October 16, 2018  1:22 PM

## 2018-10-16 NOTE — ANESTHESIA CARE TRANSFER NOTE
Patient: Linda Spicer    Procedure(s):  Anesthesia Cardioversion     Diagnosis: Atrial Fibrillation   Diagnosis Additional Information: No value filed.    Anesthesia Type:   General     Note:  Airway :Nasal Cannula  Patient transferred to:Phase II  Comments: Awake with good patent airway.  VSS.  Left patient in care of RN in ECHO lab. Handoff Report: Identifed the Patient, Identified the Reponsible Provider, Reviewed the pertinent medical history, Discussed the surgical course, Reviewed Intra-OP anesthesia mangement and issues during anesthesia, Set expectations for post-procedure period and Allowed opportunity for questions and acknowledgement of understanding      Vitals: (Last set prior to Anesthesia Care Transfer)    CRNA VITALS  10/16/2018 1020 - 10/16/2018 1050      10/16/2018             Resp Rate (observed): 16                Electronically Signed By: SUNNI Carrion CRNA  October 16, 2018  10:50 AM

## 2018-10-16 NOTE — TELEPHONE ENCOUNTER
Patient returned call    Best number to reach caller: Home number on file [phone] (home)    Is it ok to leave a detailed message: Not Applicable     Sent to RN

## 2018-10-16 NOTE — MR AVS SNAPSHOT
After Visit Summary   10/16/2018    Linda Spicer    MRN: [id number]           Patient Information     Date Of Birth          6/13/1931        Visit Information        Provider Department      10/16/2018 6:00 AM  CV DEVICE 2 [facility]        Today's Diagnoses     Sick sinus syndrome (H)    -  1       Follow-ups after your visit        Your next 10 appointments already scheduled     Oct 24, 2018  9:00 AM CDT   Level 1 with [address] ([facility])    [address] [address] [place] [id number] [phone]            Oct 25, 2018  8:00 AM CDT   TELEMEDICINE with Nelida Patel [facility] ([facility])    [address] [place] [address] [phone]           Note: this is not an onsite visit; there is no need to come to the facility.            Oct 29, 2018  9:30 AM CDT   LAB with BK LAB   [facility] ([facility])    [address] [facility] [address] [phone]           Please do not eat 10-12 hours before your appointment if you are coming in fasting for labs on lipids, cholesterol, or glucose (sugar). This does not apply to pregnant women. Water, hot tea and black coffee (with nothing added) are okay. Do not drink other fluids, diet soda or chew gum.            Oct 29, 2018 10:00 AM CDT   Office Visit with Tre Lockett MD   [facility] ([facility])    [address] [facility] [address] [phone]           Bring a current list of meds and any records pertaining to this visit. For Physicals, please bring immunization records and any forms needing to be filled out. Please arrive 10 minutes early to complete paperwork.            Nov 01, 2018  9:30 AM CDT   Core Return with Priscilla Agrawal PA-C   [facility]  [facility] ([facility])    [address]  [place] [address]   [phone]            Nov 07, 2018 11:30 AM CST   Lab with UC LAB   [facility] ([facility])    [address] [address]  1st Floor  [facility] [id number] [phone]            Nov 07, 2018 12:00 PM CST   FULL PULMONARY FUNCTION with UC PFL A   [facility] Pulmonary Function Testing ([facility])    [address]  3rd Floor  [facility] [id number] [phone]            Nov 07, 2018  1:30 PM CST   (Arrive by 1:15 PM)   Pacemaker Check with  Cv Device 1   [facility] ([facility])    [address] [place] [address]  3rd [facility]  [address]               Nov 07, 2018  2:00 PM CST   (Arrive by 1:45 PM)   RETURN ARRHYTHMIA with Franki Carrasquillo MD   [facility] ([facility])    [address] [place] [address]  [address] [place] [id number] [phone]            Nov 26, 2018  1:00 PM CST   Level 1 with BAY 8 INFUSION   [facility] ([facility])    [address] [place] [address] [phone]              Future tests that were ordered for you today     Open Future Orders        Priority Expected Expires Ordered    Follow-Up with [facility] Routine 11/1/2018 1/23/2019 10/18/2018            Who to contact     If you have questions or need follow up information about today's clinic visit or your schedule please contact [facility] directly at [phone].  Normal or non-critical lab and imaging results will be communicated to you by MyChart, letter or phone within 4 business days after the clinic has received the results. If you do not hear from us within 7 days, please contact the clinic through MyChart or phone. If you have a critical or abnormal lab result, we will notify you by phone as soon as possible.  Submit refill requests  through [employer] or call your pharmacy and they will forward the refill request to us. Please allow 3 business days for your refill to be completed.          Additional Information About Your Visit        [employer] Information     [employer] gives you secure access to your electronic health record. If you see a primary care provider, you can also send messages to your care team and make appointments. If you have questions, please call your primary care clinic.  If you do not have a primary care provider, please call [phone] and they will assist you.        Care EveryWhere ID     This is your Care EveryWhere ID. This could be used by other organizations to access your [facility] medical records  [id number]        Your Vitals Were     Last Period                   (LMP Unknown)            Blood Pressure from Last 3 Encounters:   10/18/18 138/82   10/16/18 121/50   10/15/18 143/82    Weight from Last 3 Encounters:   10/18/18 72.6 kg (160 lb)   10/16/18 69.9 kg (154 lb)   10/11/18 71.5 kg (157 lb 9.6 oz)              We Performed the Following     (36856) PM DEVICE PROGRAMMING EVAL, DUAL LEAD PACER          Today's Medication Changes      Notice     This visit is during an admission. Changes to the med list made in this visit will be reflected in the After Visit Summary of the admission.             Primary Care Provider Office Phone # Fax #    Tre Lockett [phone]       [id number] [place]  [facility] [address]        Goals        General    #1 I will complete my health care directive. (pt-stated)     Notes - Note edited  8/21/2018 10:39 AM by Behl, Melissa K, RN    Goal Statement: I will complete my health care directive.  Measure of Success: Health care directive will be completed and scanned into chart.  Supportive Steps to Achieve: Patient has attended a health care directive class, 10/24/17 informed of CPR vs intubation  Barriers: is awaiting to see her  to finalize  document  Strengths: has care coordination, home care and excellent family support  Date to Achieve By: 12/31/18  Patient expressed understanding of goal: yes               Equal Access to Services     [facility] : Paul Goyal, zofia quinn, leena garzamafidencio harrisryannefidencio, zahraa warein hayaacapri harrisnicolas kamara ladejuancapri skinner. So [facility] [phone].    ATENCIÓN: Si habla español, tiene a merchant disposición servicios gratuitos de asistencia lingüística. Llame al [phone].    We comply with applicable federal civil rights laws and [place] laws. We do not discriminate on the basis of race, color, national origin, age, disability, sex, sexual orientation, or gender identity.            Thank you!     Thank you for choosing [facility]  for your care. Our goal is always to provide you with excellent care. Hearing back from our patients is one way we can continue to improve our services. Please take a few minutes to complete the written survey that you may receive in the mail after your visit with us. Thank you!             Your Updated Medication List - Protect others around you: Learn how to safely use, store and throw away your medicines at www.disposemymeds.org.      Notice     This visit is during an admission. Changes to the med list made in this visit will be reflected in the After Visit Summary of the admission.

## 2018-10-16 NOTE — PROGRESS NOTES
Pt here for YOLANDA and Cardioversion. Procedures were explained to the pt and the consent was signed. Pt was given Fentanyl 25 mcg IV and Versed 1 mg IV for the YOLANDA. Pt tolerated the procedure without any adverse effects. Probe # 51 was used. Pt to remain NPO unitl 1125 am. Pt denies any pain or discomfort post procedure. Per Dr. Diaz pt can proceed with the cardioversion. Pt was given Brevitol 30 mg IV per anesthesia. Pt was shocked with 150 Joules backed to A paced V sensed rhythm. Pt's device was interrogated. Report called to 2A RN. Pt transported back to 2A on a stretcher.

## 2018-10-16 NOTE — ANESTHESIA PREPROCEDURE EVALUATION
Anesthesia Evaluation     . Pt has had prior anesthetic.     No history of anesthetic complications          ROS/MED HX    ENT/Pulmonary: Comment: Interstitial lung disease - neg pulmonary ROS     Neurologic:  - neg neurologic ROS     Cardiovascular: Comment: A flutter since 08/28    TTE 10/8/2018:  LVEF 55-60%  Mild to mod TR  Mild pulm HTN    (+) hypertension----. : . CHF (pEF) . . pacemaker (SSS, not dependent) :. dysrhythmias a-flutter, .       METS/Exercise Tolerance:     Hematologic:     (+) Anemia, History of Transfusion -      Musculoskeletal: Comment: DDD - lumbar  osteoporosis        GI/Hepatic: Comment: Irritable bowel syndrome       (-) GERD   Renal/Genitourinary: Comment: Stress incontinence    (+) chronic renal disease (CKD 3), type: CRI,       Endo:     (+) thyroid problem hypothyroidism, .      Psychiatric:  - neg psychiatric ROS       Infectious Disease:         Malignancy:         Other:                     Physical Exam  Normal systems: dental    Airway   Mallampati: III  TM distance: <3 FB  Neck ROM: full    Dental     Cardiovascular   Rhythm and rate: regular and normal  (-) no weak pulses and no murmur    Pulmonary    breath sounds clear to auscultation(-) no rhonchi                    Anesthesia Plan      History & Physical Review      ASA Status:  3 .    NPO Status:  > 8 hours    Plan for General     General with natural airway. PIVx1. Standard ASA monitors. Echo lab post    Discussed GA and airway adjuncts if any respiratory concerns arise.     All Q&A answered from patient/family        Postoperative Care      Consents  Anesthetic plan, risks, benefits and alternatives discussed with:  Patient..                          .

## 2018-10-16 NOTE — IP AVS SNAPSHOT
MRN:[id number]                      After Visit Summary   10/16/2018    Linda Spicer    MRN: [id number]           Visit Information        Department      10/16/2018  7:16 AM [address] [facility]          Review of your medicines      UNREVIEWED medicines. Ask your doctor about these medicines        Dose / Directions    ACETAMINOPHEN PO        Dose:  1000 mg   Take 1,000 mg by mouth 2 times daily as needed   Refills:  0       albuterol (2.5 MG/3ML) 0.083% neb solution        Dose:  1 vial   Take 1 vial by nebulization every 6 hours as needed for shortness of breath / dyspnea or wheezing   Refills:  0       amiodarone 200 MG tablet   Commonly known as:  PACERONE/CODARONE   Used for:  Paroxysmal atrial fibrillation (H)        1st week: 400mg twice a day. 2nd week: 200mg twice a day. Then 200mg daily   Quantity:  120 tablet   Refills:  3       amLODIPine 5 MG tablet   Commonly known as:  NORVASC   Used for:  Hypertension goal BP (blood pressure) < 150/90        Dose:  5 mg   Take 1 tablet (5 mg) by mouth daily   Quantity:  90 tablet   Refills:  3       apixaban ANTICOAGULANT 2.5 MG tablet   Commonly known as:  ELIQUIS   Used for:  Atrial flutter, paroxysmal (H)        Dose:  2.5 mg   Take 1 tablet (2.5 mg) by mouth 2 times daily   Quantity:  180 tablet   Refills:  3       azaTHIOprine 50 MG tablet   Commonly known as:  IMURAN   Used for:  Rheumatoid arthritis involving multiple sites with positive rheumatoid factor (H)        Dose:  50 mg   Take 1 tablet (50 mg) by mouth daily   Quantity:  90 tablet   Refills:  2       calcium carbonate 600 mg-vitamin D 400 units 600-400 MG-UNIT per tablet   Commonly known as:  CALTRATE        Dose:  1 tablet   Take 1 tablet by mouth 2 times daily   Refills:  0       cefdinir 300 MG capsule   Commonly known as:  OMNICEF   Indication:  Urinary Tract Infection   Used for:  Acute cystitis without hematuria        Dose:  300 mg   Take 1 capsule (300 mg) by mouth  daily for 6 days   Quantity:  6 capsule   Refills:  0       clotrimazole 1 % cream   Commonly known as:  LOTRIMIN   Used for:  Vaginitis and vulvovaginitis        Dose:  1 Applicatorful   Place 1 Applicatorful vaginally daily   Quantity:  50 g   Refills:  1       COMPOUNDED NON-CONTROLLED SUBSTANCE - PHARMACY TO MIX COMPOUNDED MEDICATION   Commonly known as:  CMPD RX   Used for:  Atrophic vaginitis        Estriol 1mg/gram. Place 1 gram vaginally daily for two weeks, then vaginally twice weekly after.   Quantity:  30 g   Refills:  6       fish oil-omega-3 fatty acids 1000 MG capsule        Dose:  2 g   Take 2 g by mouth daily. 2 capsules daily   Refills:  0       folic acid 1 MG tablet   Commonly known as:  FOLVITE   Used for:  Oral aphthae        Dose:  1 mg   Take 1 tablet (1 mg) by mouth daily   Quantity:  90 tablet   Refills:  3       furosemide 40 MG tablet   Commonly known as:  LASIX   Used for:  Heart failure with preserved ejection fraction, NYHA class I (H)        Dose:  20 mg   Take 0.5 tablets (20 mg) by mouth 2 times daily   Quantity:  60 tablet   Refills:  3       GENTEAL MILD OP        Apply  to eye daily.   Refills:  0       hydrocortisone 2.5 % cream   Used for:  Rash        Apply BID to affected region(s) for 7-10 days.   Quantity:  30 g   Refills:  0       levothyroxine 75 MCG tablet   Commonly known as:  SYNTHROID/LEVOTHROID   Used for:  Hypothyroidism, unspecified type        TAKE ONE TABLET BY MOUTH ONCE DAILY   Quantity:  90 tablet   Refills:  1       metoprolol succinate 25 MG 24 hr tablet   Commonly known as:  TOPROL-XL   Used for:  Hypertension goal BP (blood pressure) < 140/90        Dose:  25 mg   Take 1 tablet (25 mg) by mouth daily   Quantity:  90 tablet   Refills:  3       nitroGLYcerin 0.4 MG sublingual tablet   Commonly known as:  NITROSTAT   Used for:  Chest pain        Dose:  0.4 mg   Place 1 tablet (0.4 mg) under the tongue every 5 minutes as needed for chest pain   Quantity:  25  tablet   Refills:  0       ORENCIA IV        Inject into the vein every 28 days   Refills:  0       PRESERVISION AREDS PO        Dose:  1 tablet   Take 1 tablet by mouth daily   Refills:  0       ranibizumab 0.3 MG/0.05ML Soln   Commonly known as:  LUCENTIS        Dose:  0.3 mg   0.3 mg by Intravitreal route Every 6 weeks   Refills:  0       ranitidine 150 MG tablet   Commonly known as:  ZANTAC   Used for:  Gastroesophageal reflux disease without esophagitis        Dose:  150 mg   Take 1 tablet (150 mg) by mouth 2 times daily   Quantity:  60 tablet   Refills:  5       REFRESH P.M. Oint        Apply  to eye. Daily at bedtime   Refills:  0       saccharomyces boulardii 250 MG capsule   Commonly known as:  FLORASTOR        Dose:  250 mg   Take 250 mg by mouth daily   Refills:  0       senna-docusate 8.6-50 MG per tablet   Commonly known as:  SENOKOT-S;PERICOLACE   Used for:  Constipation, chronic        Dose:  2 tablet   Take 2 tablets by mouth At Bedtime Hold if diarrhea occurs.   Quantity:  120 tablet   Refills:  11       VITAMIN C PO        Dose:  500 mg   Take 500 mg by mouth daily   Refills:  0       ZYRTEC ALLERGY 10 MG tablet   Generic drug:  cetirizine        Dose:  10 mg   Take 10 mg by mouth At Bedtime   Refills:  0         CONTINUE these medicines which have NOT CHANGED        Dose / Directions    Blood Pressure Monitor Kit   Used for:  CHF (congestive heart failure) (H)        Dose:  1 kit   1 kit daily as needed   Quantity:  1 kit   Refills:  0       * order for DME   Used for:  Rheumatoid arthritis involving left wrist with positive rheumatoid factor (H)        Equipment being ordered: Dispense face mask. Mrs. Spicer is immunosuppressed due to rheumatoid arthritis.   Quantity:  1 Box   Refills:  11       * order for DME   Used for:  Hypoxia        Equipment being ordered: Nebulizer. Use with Albuterol.   Quantity:  1 each   Refills:  0       order for DME   Used for:  Pneumonia of left upper lobe due to  Mycoplasma pneumoniae        Equipment being ordered: Dispense baffle, for use with nebulizer.   Quantity:  1 each   Refills:  0       * order for DME   Used for:  Pain of toe of right foot, Status post bunionectomy        Dispense SP Walker-small   Quantity:  1 each   Refills:  0       * Notice:  This list has 3 medication(s) that are the same as other medications prescribed for you. Read the directions carefully, and ask your doctor or other care provider to review them with you.             Protect others around you: Learn how to safely use, store and throw away your medicines at www.disposemymeds.org.         Follow-ups after your visit        Your next 10 appointments already scheduled     Oct 17, 2018 10:10 AM CDT   LAB with LAB FIRST FLOOR [facility] ([facility])    [id number] [place] [id number] [phone]           Please do not eat 10-12 hours before your appointment if you are coming in fasting for labs on lipids, cholesterol, or glucose (sugar). This does not apply to pregnant women. Water, hot tea and black coffee (with nothing added) are okay. Do not drink other fluids, diet soda or chew gum.            Oct 17, 2018 12:00 PM CDT   TELEMEDICINE with Nelida Patel [facility] [place]    [address] [place] [address] [phone]           Note: this is not an onsite visit; there is no need to come to the facility.            Oct 18, 2018 10:00 AM CDT   CORE Enrollment with Priscilla Agrawal PA-C   [facility] ([facility])    [address] [place] [address] 2nd Floor  [facility] [address]   [phone]            Oct 24, 2018  9:00 AM CDT   Level 1 with [address] ([facility])    [address] [address] [place] [id number] [phone]            Nov  07, 2018 11:30 AM CST   Lab with UC LAB    [facility] ([facility])    [address]  1st Floor  [facility] [address]   [phone]            Nov 07, 2018 12:00 PM CST   FULL PULMONARY FUNCTION with  PFL A   [facility] Pulmonary Function Testing ([facility])    [address]  3rd Floor  [facility] [address]   [phone]            Nov 07, 2018  1:30 PM CST   (Arrive by 1:15 PM)   Pacemaker Check with Uc Cv Device 1   [facility] ([facility])    [address]  3rd Floor  [address]               Nov 07, 2018  2:00 PM CST   (Arrive by 1:45 PM)   RETURN ARRHYTHMIA with Franki Carrasquillo MD   [facility] ([facility])    [address]  [address]  [facility] [address]   [phone]            Nov 08, 2018  9:30 AM CST   Level 1 with BAY 9 INFUSION   [facility]    [address] [place] [id number] [phone]            Dec 11, 2018 10:00 AM CST   Office Visit with PFT LAB   [facility])    [address] [place] [id number] [phone]           Bring a current list of meds and any records pertaining to this visit. For Physicals, please bring immunization records and any forms needing to be filled out. Please arrive 10 minutes early to complete paperwork.               Care Instructions        Further instructions from your care team           Going Home after Sedation      For 24 hours:    An adult should stay with you.    Relax and take it easy.    DO NOT make any important legal decisions.    DO NOT drive or operate machines at home or at work.    Resume your regular diet and drink plenty of fluids.    If you have any redness/skin sorness where the patches were placed, you may use aloe vera gel as needed to help relieve local pain.  "    CALL THE PHYSICIAN IF:    You develop nausea or vomiting    You develop hives or a rash or any unexplained itching    ADDITIONAL INFORMATION:  Cardiovascular Clinic:   [address] [facility] [place] [address]  Your Care Team:  [facility] Cardiology   Telephone Number     Saloni Carrasquillo [phone]   Karol Perez RN  Kylie Dan RN  [phone]     For scheduling appts or procedures:    Lakeshia Wood   [phone]   For the Device Clinic (Pacemakers and ICD's)   RN's :   Kassandra Rose  During business hours: [phone]     After business hours:   [phone]- select option 4 and ask for job code [id number].       As always, Thank you for trusting us with your health care needs!           Additional Information About Your Visit        MyChart Information     [employer] gives you secure access to your electronic health record. If you see a primary care provider, you can also send messages to your care team and make appointments. If you have questions, please call your primary care clinic.  If you do not have a primary care provider, please call [phone] and they will assist you.        Care EveryWhere ID     This is your Care EveryWhere ID. This could be used by other organizations to access your [facility] medical records  [id number]        Your Vitals Were     Blood Pressure Pulse Temperature Respirations Height Weight    121/50 62 98.1  F (36.7  C) (Oral) 16 1.626 m (5' 4\") 69.9 kg (154 lb)    Last Period Pulse Oximetry BMI (Body Mass Index)             (LMP Unknown) 95% 26.43 kg/m2          Primary Care Provider Office Phone # Fax #    Tre Lockett [phone]      Equal Access to Services     [facility]: Hadii aad ku hadasho Soomaali, waaxda luqadaha, qaybta kaalmada adeegyada, zahraa skinner. So [facility] [phone].    ATENCIÓN: Si habla español, tiene a merchant disposición servicios gratuitos de asistencia lingüística. Llame al " [phone].    We comply with applicable federal civil rights laws and [place] laws. We do not discriminate on the basis of race, color, national origin, age, disability, sex, sexual orientation, or gender identity.            Thank you!     Thank you for choosing [facility] for your care. Our goal is always to provide you with excellent care. Hearing back from our patients is one way we can continue to improve our services. Please take a few minutes to complete the written survey that you may receive in the mail after you visit with us. Thank you!             Medication List: This is a list of all your medications and when to take them. Check marks below indicate your daily home schedule. Keep this list as a reference.      Medications           Morning Afternoon Evening Bedtime As Needed    ACETAMINOPHEN PO   Take 1,000 mg by mouth 2 times daily as needed                                albuterol (2.5 MG/3ML) 0.083% neb solution   Take 1 vial by nebulization every 6 hours as needed for shortness of breath / dyspnea or wheezing                                amiodarone 200 MG tablet   Commonly known as:  PACERONE/CODARONE   1st week: 400mg twice a day. 2nd week: 200mg twice a day. Then 200mg daily                                amLODIPine 5 MG tablet   Commonly known as:  NORVASC   Take 1 tablet (5 mg) by mouth daily                                apixaban ANTICOAGULANT 2.5 MG tablet   Commonly known as:  ELIQUIS   Take 1 tablet (2.5 mg) by mouth 2 times daily                                azaTHIOprine 50 MG tablet   Commonly known as:  IMURAN   Take 1 tablet (50 mg) by mouth daily                                Blood Pressure Monitor Kit   1 kit daily as needed                                calcium carbonate 600 mg-vitamin D 400 units 600-400 MG-UNIT per tablet   Commonly known as:  CALTRATE   Take 1 tablet by mouth 2 times daily                                cefdinir 300 MG capsule   Commonly known as:   OMNICEF   Take 1 capsule (300 mg) by mouth daily for 6 days                                clotrimazole 1 % cream   Commonly known as:  LOTRIMIN   Place 1 Applicatorful vaginally daily                                COMPOUNDED NON-CONTROLLED SUBSTANCE - PHARMACY TO MIX COMPOUNDED MEDICATION   Commonly known as:  CMPD RX   Estriol 1mg/gram. Place 1 gram vaginally daily for two weeks, then vaginally twice weekly after.                                fish oil-omega-3 fatty acids 1000 MG capsule   Take 2 g by mouth daily. 2 capsules daily                                folic acid 1 MG tablet   Commonly known as:  FOLVITE   Take 1 tablet (1 mg) by mouth daily                                furosemide 40 MG tablet   Commonly known as:  LASIX   Take 0.5 tablets (20 mg) by mouth 2 times daily                                GENTEAL MILD OP   Apply  to eye daily.                                hydrocortisone 2.5 % cream   Apply BID to affected region(s) for 7-10 days.                                levothyroxine 75 MCG tablet   Commonly known as:  SYNTHROID/LEVOTHROID   TAKE ONE TABLET BY MOUTH ONCE DAILY                                metoprolol succinate 25 MG 24 hr tablet   Commonly known as:  TOPROL-XL   Take 1 tablet (25 mg) by mouth daily                                nitroGLYcerin 0.4 MG sublingual tablet   Commonly known as:  NITROSTAT   Place 1 tablet (0.4 mg) under the tongue every 5 minutes as needed for chest pain                                * order for DME   Equipment being ordered: Dispense face mask. Mrs. Spicer is immunosuppressed due to rheumatoid arthritis.                                * order for DME   Equipment being ordered: Nebulizer. Use with Albuterol.                                order for DME   Equipment being ordered: Dispense baffle, for use with nebulizer.                                * order for DME   Dispense SP Walker-alberta SHARMA IV   Inject into the  vein every 28 days                                PRESERVISION AREDS PO   Take 1 tablet by mouth daily                                ranibizumab 0.3 MG/0.05ML Soln   Commonly known as:  LUCENTIS   0.3 mg by Intravitreal route Every 6 weeks                                ranitidine 150 MG tablet   Commonly known as:  ZANTAC   Take 1 tablet (150 mg) by mouth 2 times daily                                REFRESH P.M. Oint   Apply  to eye. Daily at bedtime                                saccharomyces boulardii 250 MG capsule   Commonly known as:  FLORASTOR   Take 250 mg by mouth daily                                senna-docusate 8.6-50 MG per tablet   Commonly known as:  SENOKOT-S;PERICOLACE   Take 2 tablets by mouth At Bedtime Hold if diarrhea occurs.                                VITAMIN C PO   Take 500 mg by mouth daily                                ZYRTEC ALLERGY 10 MG tablet   Take 10 mg by mouth At Bedtime   Generic drug:  cetirizine                                * Notice:  This list has 3 medication(s) that are the same as other medications prescribed for you. Read the directions carefully, and ask your doctor or other care provider to review them with you.

## 2018-10-16 NOTE — DISCHARGE INSTRUCTIONS
Going Home after Sedation      For 24 hours:    An adult should stay with you.    Relax and take it easy.    DO NOT make any important legal decisions.    DO NOT drive or operate machines at home or at work.    Resume your regular diet and drink plenty of fluids.    If you have any redness/skin sorness where the patches were placed, you may use aloe vera gel as needed to help relieve local pain.     CALL THE PHYSICIAN IF:    You develop nausea or vomiting    You develop hives or a rash or any unexplained itching    ADDITIONAL INFORMATION:  Cardiovascular Clinic:   [address] [place] [address]. [place] [address]  Your Care Team:  EP Cardiology   Telephone Number     Saloni Carrasquillo [phone]   Karol Perez RN  Kylie Dan RN  [phone]     For scheduling appts or procedures:    Lakeshia Wood   [phone]   For the Device Clinic (Pacemakers and ICD's)   RN's :   Kassandra Rose  During business hours: [phone]     After business hours:   [phone]- select option 4 and ask for job code [id number].       As always, Thank you for trusting us with your health care needs!

## 2018-10-16 NOTE — TELEPHONE ENCOUNTER
"ED/Discharge Protocol    \"Hi, my name is Shayla Mary Louflo, a registered nurse, and I am calling on behalf of Dr. Lockett's office at [facility].  I am calling to follow up and see how things are going for you after your recent visit.\"    \"I see that you were in the (ER/UC/IP) on 10/8/18-10/12/18.    How are you doing now that you are home?\" much better, had cardioversion and YOLANDA this morning as well.    Is patient experiencing symptoms that may require a hospital visit?  no    Discharge Instructions    \"Let's review your discharge instructions.  What is/are the follow-up recommendations?  Pt. Response: f/u with cardiology in 4-6 weeks and see PCP as needed.    \"Were you instructed to make a follow-up appointment?\"  Pt. Response: No.         **Care Coordination RN has already been in contact with this patient. Discussion has taken place and documented. See Chart Review for details. This RN assisted patient in scheduling a f/u appt with PCP for 10/29/18 at 10:00 am. No further outreach needed at this time as CCRN is in contact.  Call Summary    \"Do you have any questions or concerns about your condition or care plan at the moment?\"    No  Triage nurse advice given: Keep all follow up appointments, call office with any questions or concerns, come see PCP on 10/29 to update and f/u on recent hospitalization and cardiology appts.     Patient was in ER 8+ visits  in the past year (assess appropriateness of ER visits.)  Complex health patient, has CC RN and cardiologist following closely.    \"If you have questions or things don't continue to improve, we encourage you contact us through the main clinic number,  [phone].  Even if the clinic is not open, triage nurses are available 24/7 to help you.     We would like you to know that our clinic has extended hours (provide information).  We also have urgent care (provide details on closest location and hours/contact info)\"      \"Thank you for your time and take " "care!\"      Shayla Lee RN  [facility] Triage    "

## 2018-10-16 NOTE — PROGRESS NOTES
Pt's gag intact at 1130; taking clear liquids without problem, apple juice and water. Pt declined food. Pt awake and alert; up to bathroom walking with stand by assist. Used restroom. IV discontinued;denies pain; reviewed discharge instructions with pt and daughter, copy to pt. PT discharged to home per wheelchair with daughter.

## 2018-10-16 NOTE — IP AVS SNAPSHOT
[address] [address]                                       After Visit Summary   10/16/2018    Linda Spicer    MRN: [id number]           After Visit Summary Signature Page     I have received my discharge instructions, and my questions have been answered. I have discussed any challenges I see with this plan with the nurse or doctor.    ..........................................................................................................................................  Patient/Patient Representative Signature      ..........................................................................................................................................  Patient Representative Print Name and Relationship to Patient    ..................................................               ................................................  Date                                   Time    ..........................................................................................................................................  Reviewed by Signature/Title    ...................................................              ..............................................  Date                                               Time          22EPIC Rev 08/18

## 2018-10-16 NOTE — TELEPHONE ENCOUNTER
ED / Discharge Outreach Protocol    Patient Contact    Attempt # 2    Was call answered?  No.  Left message that RN will call back another time.  Regla Escalona RN

## 2018-10-16 NOTE — PROGRESS NOTES
Prep and teaching complete for Cardioversion; Daughter, Xi at bedside, will drive pt home; phone:  [phone].  Pt reports missed evening dose of Eliquis on Sunday night, took both doses on Monday and today (Tuesday) dose this morning; RN from [facility] notified, she reports she will follow up with staff. Pt reports no dentures or contacts or hearing aids in. Consent will be done in [facility].

## 2018-10-16 NOTE — OP NOTE
CARDIOVERSION     PROCEDURE:  direct current cardioversion  PROCEDURE DATE: 10/16/2018      Pre-procedure diagnosis:  Atrial fibrillation  Post-procedure diagnosis: s/p direct current cardioverison  Complications:  none     BRIEF CLINICAL HISTORY:  Ms. 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 She was seen in EP clinic after last DCCV on 9/19/18 after she had recurrent AF. She was started on oral amiodarone loading with plan to perform DCCV after amiodarone load. She was admitted with HFpEF exacerbation. She has underwent diuresis and had DCCV on 10/11/18. She then followed up in clinic and was back in AF and arranged again for another DCCV hoping that the more amiodarone load would be helpful in maintaining sinus.       PROCEDURE:  The patient arrived at the Echo Laboratory in a fasting, non-sedated state.  Informed consent was previously obtained from patient, who understood indications, risks, and benefits of the procedure. Transesophageal echocardiogram performed by echo lab staff which demonstrated no intracardiac thrombus. With help from Anesthesia, patient was deeply sedated.  150J of synchronized biphasic shock was delivered through the cardiac monitor, and the patient was successfully converted to AP/VS rhythm.  After sedation wore off, patient awoke and remained neurologically intact.  The patient tolerated the procedure well from a hemodynamic and respiratory standpoint without any complications.  She was observed in the Echo Laboratory and transferred back to inpatient unit after sedation wore off and she was ambulatory.     IMPRESSION:  1.  Successful direct current cardioversion with 150J biphasic shock.     RECOMMENDATIONS/PLANS:  1.   Continue anticoagulation  2.  Follow up in  EP as scheduled as outpatient.      SUNNI Ortiz CNP  Electrophysiology Consult Service  Pager: [phone]

## 2018-10-17 ENCOUNTER — TELEPHONE (OUTPATIENT)
Dept: FAMILY MEDICINE | Facility: CLINIC | Age: 83
End: 2018-10-17

## 2018-10-17 ENCOUNTER — ALLIED HEALTH/NURSE VISIT (OUTPATIENT)
Dept: PHARMACY | Facility: CLINIC | Age: 83
End: 2018-10-17
Attending: INTERNAL MEDICINE
Payer: COMMERCIAL

## 2018-10-17 DIAGNOSIS — I48.0 PAROXYSMAL ATRIAL FIBRILLATION (H): ICD-10-CM

## 2018-10-17 DIAGNOSIS — I50.33 ACUTE ON CHRONIC DIASTOLIC CONGESTIVE HEART FAILURE (H): Primary | ICD-10-CM

## 2018-10-17 DIAGNOSIS — E63.9 NUTRITIONAL DEFICIENCY: ICD-10-CM

## 2018-10-17 PROCEDURE — 99607 MTMS BY PHARM ADDL 15 MIN: CPT | Performed by: PHARMACIST

## 2018-10-17 PROCEDURE — 99605 MTMS BY PHARM NP 15 MIN: CPT | Performed by: PHARMACIST

## 2018-10-17 NOTE — TELEPHONE ENCOUNTER
..Reason for Call:   orders      Detailed comments: Requesting orders for: OT treatments 1 x week x 1 week, 2 x week x 4 weeks;     training client in ECT(energy conservation techniques) cognitive assessment and training in power mobility;        Phone Number Patient can be reached at:  phone number:  [phone]    Best Time: anytime    Can we leave a detailed message on this number? YES    Call taken on 10/17/2018 at 12:32 PM by Radha Martins

## 2018-10-17 NOTE — MR AVS SNAPSHOT
After Visit Summary   10/17/2018    Linda Spicer    MRN: [id number]           Patient Information     Date Of Birth          6/13/1931        Visit Information        Provider Department      10/17/2018 12:00 PM Nelida Patel, [facility] MTM        Care Instructions    Recommendations from today's MTM visit:                                                    MTM (medication therapy management) is a service provided by a clinical pharmacist designed to help you get the most of out of your medicines.   Today we reviewed what your medicines are for, how to know if they are working, that your medicines are safe and how to make your medicine regimen as easy as possible.     1.  As always, make sure you reaching out to your care team if you gain more than 2 pounds in 1 day or 5 pounds in a week.  If you develop significant worsening shortness of breath at any time, go to the emergency room.    2.  It is unlikely from an effectiveness perspective that you need to take both PreserVision and a multivitamin.  Continue PreserVision and consider stopping your multivitamin.    3. We will continue our discussion next week, focusing on supplements to help you determine which supplements are providing value and which may not.    Next MTM visit: I will call again at 8am on Thursday, 10/25.    To schedule another MTM appointment, please call the clinic directly or you may call the MTM scheduling line at [phone] or toll-free at [phone].     My Clinical Pharmacist's contact information:                                                      It was a pleasure seeing you today!  Please feel free to contact me with any questions or concerns you have.      Nelida Patel, PharmD, [facility]  Medication Therapy Management Provider  Phone: [phone]  [email or web address]    You may receive a survey about the MTM services you received.  I would appreciate your feedback to help me serve you  better in the future. Please fill it out and return it when you can. Your comments will be anonymous.                  Follow-ups after your visit        Your next 10 appointments already scheduled     Oct 18, 2018 10:00 AM CDT   CORE Enrollment with Priscilla Agrawal PA-C   [facility] AT [facility] ([facility])    [address]  [facility] [address]   [phone]            Oct 24, 2018  9:00 AM CDT   Level 1 with [address] ([facility])    [address] [place] [id number] [phone]            Oct 25, 2018  8:00 AM CDT   TELEMEDICINE with Nelida Patel [facility] ([facility] [place] [place])    [address] [place] [id number] [phone]           Note: this is not an onsite visit; there is no need to come to the facility.            Oct 29, 2018 10:00 AM CDT   Office Visit with Tre Lockett MD   [facility] ([facility])    [address] [facility] [id number] [phone]           Bring a current list of meds and any records pertaining to this visit. For Physicals, please bring immunization records and any forms needing to be filled out. Please arrive 10 minutes early to complete paperwork.            Nov 07, 2018 11:30 AM CST   Lab with  LAB   [facility] ([facility])    [address] [place] [address]  [address] [facility] [id number] [phone]            Nov 07, 2018 12:00 PM CST   FULL PULMONARY FUNCTION with  PFL A   [facility] Pulmonary Function Testing ([facility])    [address]  [address] [facility] [id number] [phone]            Nov 07, 2018  1:30 PM CST   (Arrive by 1:15 PM)   Pacemaker Check with  Cv Device 1   [facility] Heart Care ([facility])     [address]  3rd Floor  [address]               Nov 07, 2018  2:00 PM CST   (Arrive by 1:45 PM)   RETURN ARRHYTHMIA with Franki Carrasquillo MD   [facility] ([facility])    [address]  [address]  [facility] [address]   [phone]            Nov 08, 2018  9:30 AM CST   Level 1 with BAY 9 INFUSION   [facility]    [address] [facility] [id number] [phone]            Dec 11, 2018 10:00 AM CST   Office Visit with PFT LAB   [facility]    [address] [facility] [address] [phone]           Bring a current list of meds and any records pertaining to this visit. For Physicals, please bring immunization records and any forms needing to be filled out. Please arrive 10 minutes early to complete paperwork.              Who to contact     If you have questions or need follow up information about today's clinic visit or your schedule please contact [facility] MTM directly at [phone].  Normal or non-critical lab and imaging results will be communicated to you by MyChart, letter or phone within 4 business days after the clinic has received the results. If you do not hear from us within 7 days, please contact the clinic through [email or web address] or phone. If you have a critical or abnormal lab result, we will notify you by phone as soon as possible.  Submit refill requests through [facility] or call your pharmacy and they will forward the refill request to us. Please allow 3 business days for your refill to be completed.          Additional Information About Your Visit        [email or web address] Information     [facility] gives you secure access to your electronic health record. If you see a primary care provider, you can also send messages to your care team and make appointments. If you have questions, please call your primary care clinic.  If you  do not have a primary care provider, please call [phone] and they will assist you.        Care EveryWhere ID     This is your Care EveryWhere ID. This could be used by other organizations to access your [facility] medical records  [id number]        Your Vitals Were     Last Period                   (LMP Unknown)            Blood Pressure from Last 3 Encounters:   10/16/18 121/50   10/15/18 143/82   10/12/18 146/56    Weight from Last 3 Encounters:   10/16/18 154 lb (69.9 kg)   10/11/18 157 lb 9.6 oz (71.5 kg)   10/08/18 165 lb 8 oz (75.1 kg)              Today, you had the following     No orders found for display       Primary Care Provider Office Phone # Fax #    Tre Lockett [phone]       [address] [place]  [facility] [address]        Goals        General    #1 I will complete my health care directive. (pt-stated)     Notes - Note edited  8/21/2018 10:39 AM by Behl, Melissa K, RN    Goal Statement: I will complete my health care directive.  Measure of Success: Health care directive will be completed and scanned into chart.  Supportive Steps to Achieve: Patient has attended a health care directive class, 10/24/17 informed of CPR vs intubation  Barriers: is awaiting to see her  to finalize document  Strengths: has care coordination, home care and excellent family support  Date to Achieve By: 12/31/18  Patient expressed understanding of goal: yes               Equal Access to Services     [facility] AH: Hadii rakesh Goyal, waaxda luqadaha, qaybta kaalmada zahraa perez. So [facility] [phone].    ATENCIÓN: Si habla español, tiene a merchant disposición servicios gratuitos de asistencia lingüística. Llame al [phone].    We comply with applicable federal civil rights laws and [place] laws. We do not discriminate on the basis of race, color, national origin, age, disability, sex, sexual orientation, or gender identity.             Thank you!     Thank you for choosing [facility]  for your care. Our goal is always to provide you with excellent care. Hearing back from our patients is one way we can continue to improve our services. Please take a few minutes to complete the written survey that you may receive in the mail after your visit with us. Thank you!             Your Updated Medication List - Protect others around you: Learn how to safely use, store and throw away your medicines at www.disposemymeds.org.          This list is accurate as of 10/17/18 12:49 PM.  Always use your most recent med list.                   Brand Name Dispense Instructions for use Diagnosis    ACETAMINOPHEN PO      Take 1,000 mg by mouth 2 times daily as needed        albuterol (2.5 MG/3ML) 0.083% neb solution      Take 1 vial by nebulization every 6 hours as needed for shortness of breath / dyspnea or wheezing        amiodarone 200 MG tablet    PACERONE/CODARONE    120 tablet    1st week: 400mg twice a day. 2nd week: 200mg twice a day. Then 200mg daily    Paroxysmal atrial fibrillation (H)       amLODIPine 5 MG tablet    NORVASC    90 tablet    Take 1 tablet (5 mg) by mouth daily    Hypertension goal BP (blood pressure) < 150/90       apixaban ANTICOAGULANT 2.5 MG tablet    ELIQUIS    180 tablet    Take 1 tablet (2.5 mg) by mouth 2 times daily    Atrial flutter, paroxysmal (H)       azaTHIOprine 50 MG tablet    IMURAN    90 tablet    Take 1 tablet (50 mg) by mouth daily    Rheumatoid arthritis involving multiple sites with positive rheumatoid factor (H)       Blood Pressure Monitor Kit     1 kit    1 kit daily as needed    CHF (congestive heart failure) (H)       calcium carbonate 600 mg-vitamin D 400 units 600-400 MG-UNIT per tablet    CALTRATE     Take 1 tablet by mouth 2 times daily        cefdinir 300 MG capsule    OMNICEF    6 capsule    Take 1 capsule (300 mg) by mouth daily for 6 days    Acute cystitis without hematuria        clotrimazole 1 % cream    LOTRIMIN    50 g    Place 1 Applicatorful vaginally daily    Vaginitis and vulvovaginitis       COMPOUNDED NON-CONTROLLED SUBSTANCE - PHARMACY TO MIX COMPOUNDED MEDICATION    CMPD RX    30 g    Estriol 1mg/gram. Place 1 gram vaginally daily for two weeks, then vaginally twice weekly after.    Atrophic vaginitis       fish oil-omega-3 fatty acids 1000 MG capsule      Take 2 g by mouth daily. 2 capsules daily        folic acid 1 MG tablet    FOLVITE    90 tablet    Take 1 tablet (1 mg) by mouth daily    Oral aphthae       furosemide 40 MG tablet    LASIX    60 tablet    Take 0.5 tablets (20 mg) by mouth 2 times daily    Heart failure with preserved ejection fraction, NYHA class I (H)       GENTEAL MILD OP      Apply  to eye daily.        hydrocortisone 2.5 % cream     30 g    Apply BID to affected region(s) for 7-10 days.    Rash       levothyroxine 75 MCG tablet    SYNTHROID/LEVOTHROID    90 tablet    TAKE ONE TABLET BY MOUTH ONCE DAILY    Hypothyroidism, unspecified type       metoprolol succinate 25 MG 24 hr tablet    TOPROL-XL    90 tablet    Take 1 tablet (25 mg) by mouth daily    Hypertension goal BP (blood pressure) < 140/90       nitroGLYcerin 0.4 MG sublingual tablet    NITROSTAT    25 tablet    Place 1 tablet (0.4 mg) under the tongue every 5 minutes as needed for chest pain    Chest pain       * order for DME     1 Box    Equipment being ordered: Dispense face mask. Mrs. Spicer is immunosuppressed due to rheumatoid arthritis.    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 Use with Albuterol.    Hypoxia       order for DME     1 each    Equipment being ordered: Dispense baffle, for use with nebulizer.    Pneumonia of left upper lobe due to Mycoplasma pneumoniae       * order for DME     1 each    Dispense SP Walker-small    Pain of toe of right foot, Status post bunionectomy        ORENCIA IV      Inject into the vein every 28 days        PRESERVISION AREDS PO      Take 1 tablet by mouth daily        ranibizumab 0.3 MG/0.05ML Soln    LUCENTIS     0.3 mg by Intravitreal route Every 6 weeks        ranitidine 150 MG tablet    ZANTAC    60 tablet    Take 1 tablet (150 mg) by mouth 2 times daily    Gastroesophageal reflux disease without esophagitis       REFRESH P.M. Oint      Apply  to eye. Daily at bedtime        saccharomyces boulardii 250 MG capsule    FLORASTOR     Take 250 mg by mouth daily        senna-docusate 8.6-50 MG per tablet    SENOKOT-S;PERICOLACE    120 tablet    Take 2 tablets by mouth At Bedtime Hold if diarrhea occurs.    Constipation, chronic       VITAMIN C PO      Take 500 mg by mouth daily        ZYRTEC ALLERGY 10 MG tablet   Generic drug:  cetirizine      Take 10 mg by mouth At Bedtime        * Notice:  This list has 3 medication(s) that are the same as other medications prescribed for you. Read the directions carefully, and ask your doctor or other care provider to review them with you.

## 2018-10-17 NOTE — PATIENT INSTRUCTIONS
Recommendations from today's MTM visit:                                                    MTM (medication therapy management) is a service provided by a clinical pharmacist designed to help you get the most of out of your medicines.   Today we reviewed what your medicines are for, how to know if they are working, that your medicines are safe and how to make your medicine regimen as easy as possible.     1.  As always, make sure you reaching out to your care team if you gain more than 2 pounds in 1 day or 5 pounds in a week.  If you develop significant worsening shortness of breath at any time, go to the emergency room.    2.  It is unlikely from an effectiveness perspective that you need to take both PreserVision and a multivitamin.  Continue PreserVision and consider stopping your multivitamin.    3. We will continue our discussion next week, focusing on supplements to help you determine which supplements are providing value and which may not.    Next MTM visit: I will call again at 8am on Thursday, 10/25.    To schedule another MTM appointment, please call the clinic directly or you may call the MTM scheduling line at [phone] or toll-free at [phone].     My Clinical Pharmacist's contact information:                                                      It was a pleasure seeing you today!  Please feel free to contact me with any questions or concerns you have.      Nelida Patel, [place], [facility]  Medication Therapy Management Provider  Phone: [phone]  [email or web address]    You may receive a survey about the MTM services you received.  I would appreciate your feedback to help me serve you better in the future. Please fill it out and return it when you can. Your comments will be anonymous.

## 2018-10-17 NOTE — PROGRESS NOTES
SUBJECTIVE/OBJECTIVE:                Linda Spicer is a 87 year old female called for a transitions of care visit. I spoke primarily with her daughter, Xi, but Linda was on the call. She was discharged from [facility] on 10/12 for acute on chronic CHF with AFib.     Chief Complaint: Xi wants to make sure current meds do not have drug interactions. She is also wondering whether pt needs both PreserVision and a regular multivitamin. Pt would like to come off of any meds that are not needed.    Allergies/ADRs: Reviewed in Epic  Tobacco: No tobacco use   Alcohol: A few sips of wine once a week.    PMH: Reviewed in Epic    Medication Adherence/Access:  No concerns noted today.        HFpEF: Pt is currently taking metoprolol 25mg daily, furosemide 20mg BID, and amlodipine 5mg daily.  During last hospitalization, hydrochlorothiazide and losartan were stopped in the setting of NEIL at which point the amlodipine was started.  SOB has improved since yesterday. She did lose 8 pounds prior to cardioversion and has stayed at 154lb since and is weighing daily. Pt generally has 1/2 glass of wine on Fridays and reports rarely finishing it. She takes no NSAIDs. She has not needed her prn albuterol nebs for SOB in last 2 weeks. She is following a low-sodium diet.    AFib: Pt is currently taking amiodarone 200mg BID, which was started prior to last admission (9/19) due to control issues and limited options for rhythm control, as Multaq not covered and pt has reduced renal function.  Patient was originally on 400 twice daily but did not tolerate.  She is also on Apixaban 2.5mg BID for anticoagulation without complaint of concerning bruising or bleeding. Pt was cardioverted yesterday.     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 Dr. Carrasquillo, cardiology, did  the patient on fibrosis toxicity and she is down to 200mg BID. Pt is weighing daily and reports stable.       Supplements: Patient is currently taking both a multivitamin and PreserVision for macular degeneration.  Patient's daughter is wondering if this is necessary.    Pt reports she has not eaten anything yet today as she is unable to get low-sodium options at her current facility so Xi is hoping to take her out to lunch soon. As such we will review less acutely important disease states on follow-up. Med rec and interaction check were completed today.    Today's Vitals: LMP  (LMP Unknown)   Last Comprehensive Metabolic Panel:  Sodium   Date Value Ref Range Status   10/16/2018 137 133 - 144 mmol/L Final     Potassium   Date Value Ref Range Status   10/16/2018 4.5 3.4 - 5.3 mmol/L Final     Comment:     Specimen slightly hemolyzed, potassium may be falsely elevated     Chloride   Date Value Ref Range Status   10/16/2018 103 94 - 109 mmol/L Final     Carbon Dioxide   Date Value Ref Range Status   10/16/2018 25 20 - 32 mmol/L Final     Anion Gap   Date Value Ref Range Status   10/16/2018 9 3 - 14 mmol/L Final     Glucose   Date Value Ref Range Status   10/16/2018 98 70 - 99 mg/dL Final     Urea Nitrogen   Date Value Ref Range Status   10/16/2018 47 (H) 7 - 30 mg/dL Final     Creatinine   Date Value Ref Range Status   10/16/2018 1.55 (H) 0.52 - 1.04 mg/dL Final     GFR Estimate   Date Value Ref Range Status   10/16/2018 32 (L) >60 mL/min/1.7m2 Final     Comment:     Non  GFR Calc     Calcium   Date Value Ref Range Status   10/16/2018 8.6 8.5 - 10.1 mg/dL Final     ASSESSMENT:                 Current medications were reviewed today.      Medication Adherence: good, no issues identified    HFpEF: Improving. BP at goal <130/80.  Patient would benefit from continuing daily weights and lifestyle management as well as current medications.  Ideally would consider starting losartan at some point once serum creatinine has leveled out to protect remaining kidney function (lowering amlodipine if needed).    AFib:  Stable. Pulse at goal <80 on discharge.  Patient would benefit from continued follow-up with cardiology and regular pulmonary monitoring, as her new amiodarone start is not ideal in the presence of her pulmonary fibrosis.    Supplements: Needs improvement.  Patient may benefit from discontinuing multivitamin, as there is unlikely much benefit and there are overlapping ingredients with PreserVision.    PLAN:                  1.  Patient encouraged to discontinue multivitamin.    2.  Consider losartan restart in the near future.    3.  We will discuss all other medications including supplements and follow-up.    I spent 30 minutes with this patient today. All changes were made via collaborative practice agreement with Dr. Lockett. A copy of the visit note was provided to the patient's primary care provider.    Will follow up in 1 week.    The patient was mailed a summary of these recommendations as an after visit summary.    Chart documentation was completed in part with Dragon voice-recognition software. Even though reviewed, some grammatical, spelling, and word errors may remain.    Nelida Patel PharmD, BCACP  Medication Therapy Management Provider  Phone: [phone]  [email or web address]

## 2018-10-18 ENCOUNTER — OFFICE VISIT (OUTPATIENT)
Dept: CARDIOLOGY | Facility: CLINIC | Age: 83
End: 2018-10-18
Payer: MEDICARE

## 2018-10-18 ENCOUNTER — TELEPHONE (OUTPATIENT)
Dept: FAMILY MEDICINE | Facility: CLINIC | Age: 83
End: 2018-10-18

## 2018-10-18 VITALS
DIASTOLIC BLOOD PRESSURE: 82 MMHG | BODY MASS INDEX: 27.46 KG/M2 | WEIGHT: 160 LBS | HEART RATE: 61 BPM | OXYGEN SATURATION: 98 % | SYSTOLIC BLOOD PRESSURE: 138 MMHG

## 2018-10-18 DIAGNOSIS — I50.32 CHRONIC DIASTOLIC CONGESTIVE HEART FAILURE (H): Primary | ICD-10-CM

## 2018-10-18 DIAGNOSIS — I48.0 PAROXYSMAL ATRIAL FIBRILLATION (H): ICD-10-CM

## 2018-10-18 PROCEDURE — 99215 OFFICE O/P EST HI 40 MIN: CPT | Performed by: PHYSICIAN ASSISTANT

## 2018-10-18 NOTE — TELEPHONE ENCOUNTER
Per [facility] Care, Assisted Living or Nursing Home Evaluations and Treatments writer able to give verbal order for requested PT services. Ayesha denies any further questions or concerns.     Ana Arzate RN

## 2018-10-18 NOTE — PROGRESS NOTES
CARDIOLOGY CLINIC  Linda Spicer is a 87 year old female with diastolic heart failure, SSS with pacemaker and atrial fibrillation on anticoagulation who was referred to [facility] clinic.    She was in the ER at [facility] in July with dizziness and hypertension, switched from Bumex to Triamterene-hydrochlorothiazide for better blood pressure control. In October, hospitalized from 10/8 to 10/12 with CHF, switched to Lasix 20mg BID. Very sensitive to A fib, with several previous cardioversion, last one 2 days ago.     Today she is feeling much improved since cardioversion. She does check her weight regularly. Since being home it is up just over 1 lb. She feels very weak and tired. No chest pain or cough. Sleeps in bed with 1 small pillow. She endorses insomnia in that she often with have troubles falling back asleep if she gets up at night. When her a fib bothers her she gets very short of breath with activity. Doesn't specifically note palpitations but is very aware when she goes to a fib.     They do note she has incomplete bladder emptying.     PAST MEDICAL HISTORY:  Past Medical History:   Diagnosis Date     Adjustment disorder with anxious mood 5/18/2015     Advanced directives, counseling/discussion 8/30/2012    Patient states has Advance Directive and will bring in a copy to clinic. 8/30/2012   Tevin May  [facility] Medical Assistant \       Anemia 9/25/2015     Basal cell cancer      CHF (congestive heart failure) (H) 9/18/2014     CKD (chronic kidney disease) stage 3, GFR 30-59 ml/min (H) 9/29/2015     DDD (degenerative disc disease), lumbar 9/25/2015     Diffuse idiopathic pulmonary fibrosis (H) 5/6/2013     Encounter for palliative care 5/18/2015     History of blood transfusion 9/29/2015     Hypertension goal BP (blood pressure) < 140/90 9/7/2012     Hypothyroid 9/7/2012     Irritable bowel syndrome 10/29/2013     Macular degeneration      Macular degeneration, 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     Nondisplaced spiral  fracture of shaft of humerus      Osteoporosis 8/13/2013     Imo Update utility     RA (rheumatoid arthritis) (H) 5/7/2013     Rheumatic fever      Shingles      Spinal stenosis of lumbar region with neurogenic claudication 9/14/2015     FAMILY HISTORY:  Family History   Problem Relation Age of Onset     Hypertension Mother      Psychotic Disorder Father      Diabetes Son      Diabetes Daughter      Blood Disease Daughter      SOCIAL HISTORY: Now ,  recently passed away. Children present with her today. Lives in own place in assisted living, can eat there though notes food is usually quite salty so eats at her place.     CURRENT MEDICATIONS:  Current Outpatient Prescriptions   Medication Sig Dispense Refill     Abatacept (ORENCIA IV) Inject into the vein every 28 days       ACETAMINOPHEN PO Take 1,000 mg by mouth 2 times daily as needed        albuterol (2.5 MG/3ML) 0.083% neb solution Take 1 vial by nebulization every 6 hours as needed for shortness of breath / dyspnea or wheezing       amiodarone (PACERONE/CODARONE) 200 MG tablet 1st week: 400mg twice a day. 2nd week: 200mg twice a day. Then 200mg daily 120 tablet 3     amLODIPine (NORVASC) 5 MG tablet Take 1 tablet (5 mg) by mouth daily 90 tablet 3     apixaban ANTICOAGULANT (ELIQUIS) 2.5 MG tablet Take 1 tablet (2.5 mg) by mouth 2 times daily 180 tablet 3     Artificial Tear Ointment (REFRESH P.M.) OINT Apply  to eye. Daily at bedtime          Ascorbic Acid (VITAMIN C PO) Take 500 mg by mouth daily        azaTHIOprine (IMURAN) 50 MG tablet Take 1 tablet (50 mg) by mouth daily 90 tablet 2     Blood Pressure Monitor KIT 1 kit daily as needed 1 kit 0     calcium-vitamin D (CALTRATE) 600-400 MG-UNIT per tablet Take 1 tablet by mouth 2 times daily        cefdinir (OMNICEF) 300 MG capsule Take 1 capsule (300 mg) by mouth daily for 6 days 6 capsule 0     cetirizine (ZYRTEC ALLERGY) 10 MG tablet Take 10 mg by mouth At Bedtime       clotrimazole (LOTRIMIN)  1 % cream Place 1 Applicatorful vaginally daily 50 g 1     COMPOUNDED NON-CONTROLLED SUBSTANCE (CMPD RX) - PHARMACY TO MIX COMPOUNDED MEDICATION Estriol 1mg/gram. Place 1 gram vaginally daily for two weeks, then vaginally twice weekly after. 30 g 6     fish oil-omega-3 fatty acids (FISH OIL) 1000 MG capsule Take 2 g by mouth daily. 2 capsules daily            folic acid (FOLVITE) 1 MG tablet Take 1 tablet (1 mg) by mouth daily 90 tablet 3     furosemide (LASIX) 40 MG tablet Take 0.5 tablets (20 mg) by mouth 2 times daily 60 tablet 3     Hypromellose (GENTEAL MILD OP) Apply  to eye daily.       levothyroxine (SYNTHROID/LEVOTHROID) 75 MCG tablet TAKE ONE TABLET BY MOUTH ONCE DAILY 90 tablet 1     metoprolol succinate (TOPROL-XL) 25 MG 24 hr tablet Take 1 tablet (25 mg) by mouth daily 90 tablet 3     Multiple Vitamins-Minerals (PRESERVISION AREDS PO) Take 1 tablet by mouth daily        order for DME Dispense SP Walker-small 1 each 0     order for DME Equipment being ordered: Dispense baffle, for use with nebulizer. 1 each 0     order for DME Equipment being ordered: Nebulizer. Use with Albuterol. 1 each 0     order for DME Equipment being ordered: Dispense face mask.  Mrs. Spicer is immunosuppressed due to rheumatoid arthritis. 1 Box 11     ranibizumab (LUCENTIS) 0.3 MG/0.05ML SOLN 0.3 mg by Intravitreal route Every 6 weeks       ranitidine (ZANTAC) 150 MG tablet Take 1 tablet (150 mg) by mouth 2 times daily 60 tablet 5     saccharomyces boulardii (FLORASTOR) 250 MG capsule Take 250 mg by mouth daily       senna-docusate (SENOKOT-S;PERICOLACE) 8.6-50 MG per tablet Take 2 tablets by mouth At Bedtime Hold if diarrhea occurs. 120 tablet 11     hydrocortisone 2.5 % cream Apply BID to affected region(s) for 7-10 days. (Patient not taking: Reported on 10/17/2018) 30 g 0     nitroglycerin (NITROSTAT) 0.4 MG SL tablet Place 1 tablet (0.4 mg) under the tongue every 5 minutes as needed for chest pain (Patient not taking: Reported  on 10/17/2018) 25 tablet 0     ROS:  No fever or chills  Mild epistaxis  No cough or dyspnea currently  No chest pain  No nausea, vomiting or diarrhea  Incomplete bladder emptying as above    EXAM:  /80 (BP Location: Right arm, Patient Position: Sitting, Cuff Size: Adult Large)  Pulse 61  Wt 72.6 kg (160 lb)  LMP  (LMP Unknown)  SpO2 100%  BMI 27.46 kg/m2  General: seated in chair, in NAD  HEENT: NC/AT, sclera anicteric, MMM  Card: RRR, no murmur appreciated. JVP 8cm  Pulm: CTA B, no wheezing  Abdomen: ND, +BS, soft, NT  Extremities: tr-1+ edema    Neurological: alert, conversant with normal speech, moving all extremities equally  Skin:  No ecchymoses, rashes, or clubbing.  Vascular: No carotid bruit, 2+ radial and DP pulses bilateral    Labs:  CBC RESULTS:  Lab Results   Component Value Date    WBC 6.5 10/09/2018    RBC 3.47 (L) 10/09/2018    HGB 11.5 (L) 10/09/2018    HCT 34.3 (L) 10/09/2018    MCV 99 10/09/2018    MCH 33.1 (H) 10/09/2018    MCHC 33.5 10/09/2018    RDW 14.0 10/09/2018     10/09/2018     CMP RESULTS:  Lab Results   Component Value Date     10/16/2018    POTASSIUM 4.5 10/16/2018    CHLORIDE 103 10/16/2018    CO2 25 10/16/2018    ANIONGAP 9 10/16/2018    GLC 98 10/16/2018    BUN 47 (H) 10/16/2018    CR 1.55 (H) 10/16/2018    GFRESTIMATED 32 (L) 10/16/2018    GFRESTBLACK 38 (L) 10/16/2018    FATOUMATA 8.6 10/16/2018    BILITOTAL 0.5 08/15/2018    ALBUMIN 3.4 08/15/2018    ALKPHOS 111 08/15/2018    ALT 18 08/15/2018    AST 18 08/15/2018      Lab Results   Component Value Date    NTBNP 3657 (H) 10/16/2018     Most recent echocardiogram 10/16/2018: LVEF normal at 55-60%, global RV function normal. Moderate to severe MR with multiple jets so possibly worse, moderate TR. Moderate LA enlargement. Mild RA enlargement.     Assessment and Plan:    Linda Spicer is a 87 year old female with diastolic heart failure who presents for follow up. She underwent recent cardioversion and feels well.  Of course, her heart failure will vary depending on the status of her atrial fibrillation.     The mainstays of therapy for her HFpEF include volume management, blood pressure control, treating underlying sleep apnea if present, treatment of underlying CAD if within the goals of care, weight management, exercise training, rate control for atrial fibrillation as well as consideration for a rhythm control strategy, as well as consideration for clinical trials.      In all of our patients we consider spironolactone in low risk patients given the positive CHF results in the TOPCAT trial.     1. Chronic diastolic heart failure, Stage C, NYHA class II   Fluid status euvolemic  Aldosterone antagonist: defer secondary to CKD and high normal potassium  BP: borderline control- have asked her to keep and bring log to next appointment   Ischemia evaluation- negative  stress in . Did briefly discuss possibility of repeat evaluation if it meets their goals of care.   Atrial fibrillation- she relies largely on her atrial contraction which gets lost when she's in a fib of course, in addition to the tachycardia which shortens her diastole as well. Will defer management to EP [facility] as they have questions as to how often she can continue to have cardioversion. Note that if/when she is in a fib again to notify [facility] clinic so we can closely monitor her heart failure as she will likely need adjusting at those times.   Daily weights and sodium/water restrictions discussed.     Follow-up 1 week with labs prior    I spent 40 minutes with patient and family, >50% on counseling and education.       Priscilla Agrawal PA-C  [facility]  Pager [phone]

## 2018-10-18 NOTE — LETTER
10/18/2018      RE: Linda Spicer  [address]  [facility] [id number]       Dear Colleague,    Thank you for the opportunity to participate in the care of your patient, Linda Spicer, at the [facility] AT [facility]. Please see a copy of my visit note below.    CARDIOLOGY CLINIC  Linda Spicer is a 87 year old female with diastolic heart failure, SSS with pacemaker and atrial fibrillation on anticoagulation who was referred to [facility] clinic.    She was in the ER at [facility] in July with dizziness and hypertension, switched from Bumex to Triamterene-hydrochlorothiazide for better blood pressure control. In October, hospitalized from 10/8 to 10/12 with CHF, switched to Lasix 20mg BID. Very sensitive to A fib, with several previous cardioversion, last one 2 days ago.     Today she is feeling much improved since cardioversion. She does check her weight regularly. Since being home it is up just over 1 lb. She feels very weak and tired. No chest pain or cough. Sleeps in bed with 1 small pillow. She endorses insomnia in that she often with have troubles falling back asleep if she gets up at night. When her a fib bothers her she gets very short of breath with activity. Doesn't specifically note palpitations but is very aware when she goes to a fib.     They do note she has incomplete bladder emptying.     PAST MEDICAL HISTORY:  Past Medical History:   Diagnosis Date     Adjustment disorder with anxious mood 5/18/2015     Advanced directives, counseling/discussion 8/30/2012    Patient states has Advance Directive and will bring in a copy to clinic. 8/30/2012   Tevin May  Clinic Medical Assistant \       Anemia 9/25/2015     Basal cell cancer      CHF (congestive heart failure) (H) 9/18/2014     CKD (chronic kidney disease) stage 3, GFR 30-59 ml/min (H) 9/29/2015     DDD (degenerative disc disease),  lumbar 9/25/2015     Diffuse idiopathic pulmonary fibrosis (H) 5/6/2013     Encounter for palliative care 5/18/2015     History of blood transfusion 9/29/2015     Hypertension goal BP (blood pressure) < 140/90 9/7/2012     Hypothyroid 9/7/2012     Irritable bowel syndrome 10/29/2013     Macular degeneration      Macular degeneration, 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     Nondisplaced spiral fracture of shaft of humerus      Osteoporosis 8/13/2013     Imo Update utility     RA (rheumatoid arthritis) (H) 5/7/2013     Rheumatic fever      Shingles      Spinal stenosis of lumbar region with neurogenic claudication 9/14/2015     FAMILY HISTORY:  Family History   Problem Relation Age of Onset     Hypertension Mother      Psychotic Disorder Father      Diabetes Son      Diabetes Daughter      Blood Disease Daughter      SOCIAL HISTORY: Now ,  recently passed away. Children present with her today. Lives in own place in assisted living, can eat there though notes food is usually quite salty so eats at her place.     CURRENT MEDICATIONS:  Current Outpatient Prescriptions   Medication Sig Dispense Refill     Abatacept (ORENCIA IV) Inject into the vein every 28 days       ACETAMINOPHEN PO Take 1,000 mg by mouth 2 times daily as needed        albuterol (2.5 MG/3ML) 0.083% neb solution Take 1 vial by nebulization every 6 hours as needed for shortness of breath / dyspnea or wheezing       amiodarone (PACERONE/CODARONE) 200 MG tablet 1st week: 400mg twice a day. 2nd week: 200mg twice a day. Then 200mg daily 120 tablet 3     amLODIPine (NORVASC) 5 MG tablet Take 1 tablet (5 mg) by mouth daily 90 tablet 3     apixaban ANTICOAGULANT (ELIQUIS) 2.5 MG tablet Take 1 tablet (2.5 mg) by mouth 2 times daily 180 tablet 3     Artificial Tear Ointment (REFRESH P.M.) OINT Apply  to eye. Daily at bedtime          Ascorbic Acid (VITAMIN C PO) Take 500 mg by mouth daily        azaTHIOprine (IMURAN) 50 MG tablet Take 1 tablet (50 mg) by mouth daily  90 tablet 2     Blood Pressure Monitor KIT 1 kit daily as needed 1 kit 0     calcium-vitamin D (CALTRATE) 600-400 MG-UNIT per tablet Take 1 tablet by mouth 2 times daily        cefdinir (OMNICEF) 300 MG capsule Take 1 capsule (300 mg) by mouth daily for 6 days 6 capsule 0     cetirizine (ZYRTEC ALLERGY) 10 MG tablet Take 10 mg by mouth At Bedtime       clotrimazole (LOTRIMIN) 1 % cream Place 1 Applicatorful vaginally daily 50 g 1     COMPOUNDED NON-CONTROLLED SUBSTANCE (CMPD RX) - PHARMACY TO MIX COMPOUNDED MEDICATION Estriol 1mg/gram. Place 1 gram vaginally daily for two weeks, then vaginally twice weekly after. 30 g 6     fish oil-omega-3 fatty acids (FISH OIL) 1000 MG capsule Take 2 g by mouth daily. 2 capsules daily            folic acid (FOLVITE) 1 MG tablet Take 1 tablet (1 mg) by mouth daily 90 tablet 3     furosemide (LASIX) 40 MG tablet Take 0.5 tablets (20 mg) by mouth 2 times daily 60 tablet 3     Hypromellose (GENTEAL MILD OP) Apply  to eye daily.       levothyroxine (SYNTHROID/LEVOTHROID) 75 MCG tablet TAKE ONE TABLET BY MOUTH ONCE DAILY 90 tablet 1     metoprolol succinate (TOPROL-XL) 25 MG 24 hr tablet Take 1 tablet (25 mg) by mouth daily 90 tablet 3     Multiple Vitamins-Minerals (PRESERVISION AREDS PO) Take 1 tablet by mouth daily        order for DME Dispense SP Walker-small 1 each 0     order for DME Equipment being ordered: Dispense baffle, for use with nebulizer. 1 each 0     order for DME Equipment being ordered: Nebulizer. Use with Albuterol. 1 each 0     order for DME Equipment being ordered: Dispense face mask.  Mrs. Spicer is immunosuppressed due to rheumatoid arthritis. 1 Box 11     ranibizumab (LUCENTIS) 0.3 MG/0.05ML SOLN 0.3 mg by Intravitreal route Every 6 weeks       ranitidine (ZANTAC) 150 MG tablet Take 1 tablet (150 mg) by mouth 2 times daily 60 tablet 5     saccharomyces boulardii (FLORASTOR) 250 MG capsule Take 250 mg by mouth daily       senna-docusate (SENOKOT-S;PERICOLACE)  8.6-50 MG per tablet Take 2 tablets by mouth At Bedtime Hold if diarrhea occurs. 120 tablet 11     hydrocortisone 2.5 % cream Apply BID to affected region(s) for 7-10 days. (Patient not taking: Reported on 10/17/2018) 30 g 0     nitroglycerin (NITROSTAT) 0.4 MG SL tablet Place 1 tablet (0.4 mg) under the tongue every 5 minutes as needed for chest pain (Patient not taking: Reported on 10/17/2018) 25 tablet 0     ROS:  No fever or chills  Mild epistaxis  No cough or dyspnea currently  No chest pain  No nausea, vomiting or diarrhea  Incomplete bladder emptying as above    EXAM:  /80 (BP Location: Right arm, Patient Position: Sitting, Cuff Size: Adult Large)  Pulse 61  Wt 72.6 kg (160 lb)  LMP  (LMP Unknown)  SpO2 100%  BMI 27.46 kg/m2  General: seated in chair, in NAD  HEENT: NC/AT, sclera anicteric, MMM  Card: RRR, no murmur appreciated. JVP 8cm  Pulm: CTA B, no wheezing  Abdomen: ND, +BS, soft, NT  Extremities: tr-1+ edema    Neurological: alert, conversant with normal speech, moving all extremities equally  Skin:  No ecchymoses, rashes, or clubbing.  Vascular: No carotid bruit, 2+ radial and DP pulses bilateral    Labs:  CBC RESULTS:  Lab Results   Component Value Date    WBC 6.5 10/09/2018    RBC 3.47 (L) 10/09/2018    HGB 11.5 (L) 10/09/2018    HCT 34.3 (L) 10/09/2018    MCV 99 10/09/2018    MCH 33.1 (H) 10/09/2018    MCHC 33.5 10/09/2018    RDW 14.0 10/09/2018     10/09/2018     CMP RESULTS:  Lab Results   Component Value Date     10/16/2018    POTASSIUM 4.5 10/16/2018    CHLORIDE 103 10/16/2018    CO2 25 10/16/2018    ANIONGAP 9 10/16/2018    GLC 98 10/16/2018    BUN 47 (H) 10/16/2018    CR 1.55 (H) 10/16/2018    GFRESTIMATED 32 (L) 10/16/2018    GFRESTBLACK 38 (L) 10/16/2018    FATOUMATA 8.6 10/16/2018    BILITOTAL 0.5 08/15/2018    ALBUMIN 3.4 08/15/2018    ALKPHOS 111 08/15/2018    ALT 18 08/15/2018    AST 18 08/15/2018      Lab Results   Component Value Date    NTBNP 3657 (H) 10/16/2018      Most recent echocardiogram 10/16/2018: LVEF normal at 55-60%, global RV function normal. Moderate to severe MR with multiple jets so possibly worse, moderate TR. Moderate LA enlargement. Mild RA enlargement.     Assessment and Plan:    Linda Spicer is a 87 year old female with diastolic heart failure who presents for follow up. She underwent recent cardioversion and feels well. Of course, her heart failure will vary depending on the status of her atrial fibrillation.     The mainstays of therapy for her HFpEF include volume management, blood pressure control, treating underlying sleep apnea if present, treatment of underlying CAD if within the goals of care, weight management, exercise training, rate control for atrial fibrillation as well as consideration for a rhythm control strategy, as well as consideration for clinical trials.      In all of our patients we consider spironolactone in low risk patients given the positive CHF results in the TOPCAT trial.     1. Chronic diastolic heart failure, Stage C, NYHA class II   Fluid status euvolemic  Aldosterone antagonist: defer secondary to CKD and high normal potassium  BP: borderline control- have asked her to keep and bring log to next appointment   Ischemia evaluation- negative  stress in . Did briefly discuss possibility of repeat evaluation if it meets their goals of care.   Atrial fibrillation- she relies largely on her atrial contraction which gets lost when she's in a fib of course, in addition to the tachycardia which shortens her diastole as well. Will defer management to EP [place] as they have questions as to how often she can continue to have cardioversion. Note that if/when she is in a fib again to notify [facility] clinic so we can closely monitor her heart failure as she will likely need adjusting at those times.   Daily weights and sodium/water restrictions discussed.     Follow-up 1 week with labs prior    I spent 40 minutes with patient and  family, >50% on counseling and education.       Priscilla Agrawal PA-C  [facility]  Pager [phone]

## 2018-10-18 NOTE — TELEPHONE ENCOUNTER
This writer attempted to contact Josephine on 10/18/18      Reason for call inform orders and left detailed message.      If patient calls back:   Relay message, (read verbatim), document that pt called and close encounter        Ron Medel MA

## 2018-10-18 NOTE — PATIENT INSTRUCTIONS
"Thank you for seeing Rochelle Agrawal PA-C at the [facility] @ [place];  please note the following instructions:    1. No medication changes today.   2. Bring log of weights and blood pressures to next clinic visit  3. Follow up in [facility] Clinic on  at 10AM with labs a day prior.    Results for RG WALTER (MRN [id number]) as of 10/18/2018 10:33   Ref. Range 10/16/2018 07:34   Sodium Latest Ref Range: 133 - 144 mmol/L 137   Potassium Latest Ref Range: 3.4 - 5.3 mmol/L 4.5   Chloride Latest Ref Range: 94 - 109 mmol/L 103   Carbon Dioxide Latest Ref Range: 20 - 32 mmol/L 25   Urea Nitrogen Latest Ref Range: 7 - 30 mg/dL 47 (H)   Creatinine Latest Ref Range: 0.52 - 1.04 mg/dL 1.55 (H)   GFR Estimate Latest Ref Range: >60 mL/min/1.7m2 32 (L)   GFR Estimate If Black Latest Ref Range: >60 mL/min/1.7m2 38 (L)   Calcium Latest Ref Range: 8.5 - 10.1 mg/dL 8.6   Anion Gap Latest Ref Range: 3 - 14 mmol/L 9   Magnesium Latest Ref Range: 1.6 - 2.3 mg/dL 2.1   N-Terminal Pro Bnp Latest Ref Range: 0 - 450 pg/mL 3657 (H)   Glucose Latest Ref Range: 70 - 99 mg/dL 98   INR Latest Ref Range: 0.86 - 1.14  1.32 (H)       _______________________________________________________________________    Please limit your fluid intake to 2 L (64 ounces) daily.    2 Liters a day = 8.5 cups, or 72 ounces.  Please limit your salt intake to 2 grams a day or less.    If you gain 2# in 24 hours or 5# in one week call us so we can adjust your medications as needed over the phone.      Please feel free to call me with any questions or concerns.    Lizett Zapata RN   [facility]  Cardiology Care Coordinator-Heart Failure Clinic    *Questions and [phone].   First press #1 for the University and then press #3 for \"Medical Questions\" to reach us Cardiology Nurses.     *On Call Cardiologist for after hours or on weekends: [phone]   option #4 and ask to speak to the " on-call Cardiologist. Inform them you are a CORE/heart failure patient at the [facility].    *If you need a medication refill, please contact your pharmacy.  Please allow 3 business days for your refill to be completed.  _______________________________________________________  [facility] Cardiomyopathy, Optimization, Rehabilitation, Education   The [facility] is a heart failure specialty clinic within the [facility] where you will work with specialized nurse practitioners dedicated to helping patients with heart failure carefully adjust medications, receive education, and learn who and when to call if symptoms develop. They specialize in helping you better understand your condition, slow the progression of your disease, improve the length and quality of your life, help you detect future heart problems before they become life threatening, and avoid hospitalizations.  As always, thank you for trusting us with your health care needs!

## 2018-10-18 NOTE — MR AVS SNAPSHOT
After Visit Summary   10/18/2018    Linda Walter    MRN: [id number]           Patient Information     Date Of Birth          6/13/1931        Visit Information        Provider Department      10/18/2018 10:00 AM Priscilla Agrawal PA-C [facility] PHYSICIANS HEART AT [facility]        Today's Diagnoses     Chronic diastolic congestive heart failure (H)    -  1      Care Instructions    Thank you for seeing Rochelle Agrawal PA-C at the [facility] @ [facility];  please note the following instructions:    1. No medication changes today.   2. Bring log of weights and blood pressures to next clinic visit  3. Follow up in [facility] Clinic on November 1st at 10AM with labs a day prior.    Results for LINDA WALTER (MRN [id number]) as of 10/18/2018 10:33   Ref. Range 10/16/2018 07:34   Sodium Latest Ref Range: 133 - 144 mmol/L 137   Potassium Latest Ref Range: 3.4 - 5.3 mmol/L 4.5   Chloride Latest Ref Range: 94 - 109 mmol/L 103   Carbon Dioxide Latest Ref Range: 20 - 32 mmol/L 25   Urea Nitrogen Latest Ref Range: 7 - 30 mg/dL 47 (H)   Creatinine Latest Ref Range: 0.52 - 1.04 mg/dL 1.55 (H)   GFR Estimate Latest Ref Range: >60 mL/min/1.7m2 32 (L)   GFR Estimate If Black Latest Ref Range: >60 mL/min/1.7m2 38 (L)   Calcium Latest Ref Range: 8.5 - 10.1 mg/dL 8.6   Anion Gap Latest Ref Range: 3 - 14 mmol/L 9   Magnesium Latest Ref Range: 1.6 - 2.3 mg/dL 2.1   N-Terminal Pro Bnp Latest Ref Range: 0 - 450 pg/mL 3657 (H)   Glucose Latest Ref Range: 70 - 99 mg/dL 98   INR Latest Ref Range: 0.86 - 1.14  1.32 (H)       _______________________________________________________________________    Please limit your fluid intake to 2 L (64 ounces) daily.    2 Liters a day = 8.5 cups, or 72 ounces.  Please limit your salt intake to 2 grams a day or less.    If you gain 2# in 24 hours or 5# in one week call us so we can adjust your medications as needed over the phone.      Please  "feel free to call me with any questions or concerns.    Lizett Zapata, BRANDI   [facility]  Cardiology Care Coordinator-Heart Failure Clinic    *Questions and [phone].   First press #1 for the University and then press #3 for \"Medical Questions\" to reach us Cardiology Nurses.     *On Call Cardiologist for after hours or on weekends: [phone]   option #4 and ask to speak to the on-call Cardiologist. Inform them you are a CORE/heart failure patient at the [facility].    *If you need a medication refill, please contact your pharmacy.  Please allow 3 business days for your refill to be completed.  _______________________________________________________  C.O.R.E. CLINIC Cardiomyopathy, Optimization, Rehabilitation, Education   The C.O.R.E. CLINIC is a heart failure specialty clinic within the [facility] where you will work with specialized nurse practitioners dedicated to helping patients with heart failure carefully adjust medications, receive education, and learn who and when to call if symptoms develop. They specialize in helping you better understand your condition, slow the progression of your disease, improve the length and quality of your life, help you detect future heart problems before they become life threatening, and avoid hospitalizations.  As always, thank you for trusting us with your health care needs!              Follow-ups after your visit        Additional Services     Follow-Up with [facility] Clinic       Rochelle Mac                  Your next 10 appointments already scheduled     Oct 24, 2018  9:00 AM CDT   Level 1 with [address] ([facility])    [address] [address] [place] [id number] [phone]            Oct 25, 2018  8:00 AM CDT   TELEMEDICINE with Nelida Patel [facility] ([facility]    [address] [facility]  [place] [address] [phone]           Note: this is not an onsite visit; there is no need to come to the facility.            Oct 29, 2018  9:30 AM CDT   [facility]    [address] [facility] [address]   [phone]           Please do not eat 10-12 hours before your appointment if you are coming in fasting for labs on lipids, cholesterol, or glucose (sugar). This does not apply to pregnant women. Water, hot tea and black coffee (with nothing added) are okay. Do not drink other fluids, diet soda or chew gum.            Oct 29, 2018 10:00 AM CDT   [facility]    [address] [facility] [id number] [phone]           Bring a current list of meds and any records pertaining to this visit. For Physicals, please bring immunization records and any forms needing to be filled out. Please arrive 10 minutes early to complete paperwork.            Nov 01, 2018  9:30 AM CDT   Core Return with Priscilla Agrawal PA-C   [facility] PHYSICIANS HEART AT [facility] ([facility])    [address] [place] [address] 2nd [facility] [address]   [phone]            Nov 07, 2018 11:30 AM CST   Lab with UC LAB    [facility] ([facility])    [address] [place] [address]  1st [facility] [id number] [phone]            Nov 07, 2018 12:00 PM CST   FULL PULMONARY FUNCTION with UC PFL A   [facility] Pulmonary Function Testing ([facility])    [address] [place] [address]  3rd [facility] [id number] [phone]            Nov 07, 2018  1:30 PM CST   (Arrive by 1:15 PM)   Pacemaker Check with Uc Cv Device 1   [facility] Care ([facility])    [address] [place] [address]  [id number]                Nov 07, 2018  2:00 PM CST   (Arrive by 1:45 PM)   RETURN ARRHYTHMIA with Franki Carrasquillo MD   [facility] ([facility])    [address] [facility]  [address] [place] [address] [phone]            Nov 08, 2018  9:30 AM CST   Level 1 with [address] ([facility])    [address] [address] [place] [address] [phone]              Future tests that were ordered for you today     Open Future Orders        Priority Expected Expires Ordered    Follow-Up with [facility] Clinic Routine 11/1/2018 1/23/2019 10/18/2018            Who to contact     If you have questions or need follow up information about today's clinic visit or your schedule please contact [facility] PHYSICIANS HEART AT [facility] directly at [phone].  Normal or non-critical lab and imaging results will be communicated to you by MyChart, letter or phone within 4 business days after the clinic has received the results. If you do not hear from us within 7 days, please contact the clinic through [employer] or phone. If you have a critical or abnormal lab result, we will notify you by phone as soon as possible.  Submit refill requests through [employer] or call your pharmacy and they will forward the refill request to us. Please allow 3 business days for your refill to be completed.          Additional Information About Your Visit        MyChart Information     [employer] gives you secure access to your electronic health record. If you see a primary care provider, you can also send messages to your care team and make appointments. If you have questions, please call your primary care clinic.  If you do not have a primary care provider, please call [phone] and they will assist you.        Care EveryWhere ID     This is your Care EveryWhere ID. This could be used by other organizations to access your [facility] medical records  [id number]        Your  Vitals Were     Pulse Last Period Pulse Oximetry BMI (Body Mass Index)          61 (LMP Unknown) 98% 27.46 kg/m2         Blood Pressure from Last 3 Encounters:   10/18/18 138/82   10/16/18 121/50   10/15/18 143/82    Weight from Last 3 Encounters:   10/18/18 72.6 kg (160 lb)   10/16/18 69.9 kg (154 lb)   10/11/18 71.5 kg (157 lb 9.6 oz)               Primary Care Provider Office Phone # Fax #    Tre Lockett [phone]       [id number]  Health system [address]        Goals        General    #1 I will complete my health care directive. (pt-stated)     Notes - Note edited  8/21/2018 10:39 AM by Behl, Melissa K, RN    Goal Statement: I will complete my health care directive.  Measure of Success: Health care directive will be completed and scanned into chart.  Supportive Steps to Achieve: Patient has attended a health care directive class, 10/24/17 informed of CPR vs intubation  Barriers: is awaiting to see her  to finalize document  Strengths: has care coordination, home care and excellent family support  Date to Achieve By: 12/31/18  Patient expressed understanding of goal: yes               Equal Access to Services     [facility] AH: Hadii rakesh schuster hadasho Sojasonali, waaxda luqadaha, qaybta kaalmada adeegyada, zahraa skinner. So [facility] [phone].    ATENCIÓN: Si habla español, tiene a merchant disposición servicios gratuitos de asistencia lingüística. Llame al [phone].    We comply with applicable federal civil rights laws and [place] laws. We do not discriminate on the basis of race, color, national origin, age, disability, sex, sexual orientation, or gender identity.            Thank you!     Thank you for choosing [facility] PHYSICIANS HEART AT [facility]  for your care. Our goal is always to provide you with excellent care. Hearing back from our patients is one way we can continue to improve our services. Please take a few minutes to  complete the written survey that you may receive in the mail after your visit with us. Thank you!             Your Updated Medication List - Protect others around you: Learn how to safely use, store and throw away your medicines at www.disposemymeds.org.          This list is accurate as of 10/18/18 11:47 AM.  Always use your most recent med list.                   Brand Name Dispense Instructions for use Diagnosis    ACETAMINOPHEN PO      Take 1,000 mg by mouth 2 times daily as needed        albuterol (2.5 MG/3ML) 0.083% neb solution      Take 1 vial by nebulization every 6 hours as needed for shortness of breath / dyspnea or wheezing        amiodarone 200 MG tablet    PACERONE/CODARONE    120 tablet    1st week: 400mg twice a day. 2nd week: 200mg twice a day. Then 200mg daily    Paroxysmal atrial fibrillation (H)       amLODIPine 5 MG tablet    NORVASC    90 tablet    Take 1 tablet (5 mg) by mouth daily    Hypertension goal BP (blood pressure) < 150/90       apixaban ANTICOAGULANT 2.5 MG tablet    ELIQUIS    180 tablet    Take 1 tablet (2.5 mg) by mouth 2 times daily    Atrial flutter, paroxysmal (H)       azaTHIOprine 50 MG tablet    IMURAN    90 tablet    Take 1 tablet (50 mg) by mouth daily    Rheumatoid arthritis involving multiple sites with positive rheumatoid factor (H)       Blood Pressure Monitor Kit     1 kit    1 kit daily as needed    CHF (congestive heart failure) (H)       calcium carbonate 600 mg-vitamin D 400 units 600-400 MG-UNIT per tablet    CALTRATE     Take 1 tablet by mouth 2 times daily        cefdinir 300 MG capsule    OMNICEF    6 capsule    Take 1 capsule (300 mg) by mouth daily for 6 days    Acute cystitis without hematuria       clotrimazole 1 % cream    LOTRIMIN    50 g    Place 1 Applicatorful vaginally daily    Vaginitis and vulvovaginitis       COMPOUNDED NON-CONTROLLED SUBSTANCE - PHARMACY TO MIX COMPOUNDED MEDICATION    CMPD RX    30 g    Estriol 1mg/gram. Place 1 gram vaginally  daily for two weeks, then vaginally twice weekly after.    Atrophic vaginitis       fish oil-omega-3 fatty acids 1000 MG capsule      Take 2 g by mouth daily. 2 capsules daily        folic acid 1 MG tablet    FOLVITE    90 tablet    Take 1 tablet (1 mg) by mouth daily    Oral aphthae       furosemide 40 MG tablet    LASIX    60 tablet    Take 0.5 tablets (20 mg) by mouth 2 times daily    Heart failure with preserved ejection fraction, NYHA class I (H)       GENTEAL MILD OP      Apply  to eye daily.        hydrocortisone 2.5 % cream     30 g    Apply BID to affected region(s) for 7-10 days.    Rash       levothyroxine 75 MCG tablet    SYNTHROID/LEVOTHROID    90 tablet    TAKE ONE TABLET BY MOUTH ONCE DAILY    Hypothyroidism, unspecified type       metoprolol succinate 25 MG 24 hr tablet    TOPROL-XL    90 tablet    Take 1 tablet (25 mg) by mouth daily    Hypertension goal BP (blood pressure) < 140/90       nitroGLYcerin 0.4 MG sublingual tablet    NITROSTAT    25 tablet    Place 1 tablet (0.4 mg) under the tongue every 5 minutes as needed for chest pain    Chest pain       * order for DME     1 Box    Equipment being ordered: Dispense face mask. Mrs. Spicer is immunosuppressed due to rheumatoid arthritis.    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 Use with Albuterol.    Hypoxia       order for DME     1 each    Equipment being ordered: Dispense baffle, for use with nebulizer.    Pneumonia of left upper lobe due to Mycoplasma pneumoniae       * order for DME     1 each    Dispense SP Walker-small    Pain of toe of right foot, Status post bunionectomy       ORENCIA IV      Inject into the vein every 28 days        PRESERVISION AREDS PO      Take 1 tablet by mouth daily        ranibizumab 0.3 MG/0.05ML Soln    LUCENTIS     0.3 mg by Intravitreal route Every 6 weeks        ranitidine 150 MG tablet    ZANTAC    60  tablet    Take 1 tablet (150 mg) by mouth 2 times daily    Gastroesophageal reflux disease without esophagitis       REFRESH P.M. Oint      Apply  to eye. Daily at bedtime        saccharomyces boulardii 250 MG capsule    FLORASTOR     Take 250 mg by mouth daily        senna-docusate 8.6-50 MG per tablet    SENOKOT-S;PERICOLACE    120 tablet    Take 2 tablets by mouth At Bedtime Hold if diarrhea occurs.    Constipation, chronic       VITAMIN C PO      Take 500 mg by mouth daily        ZYRTEC ALLERGY 10 MG tablet   Generic drug:  cetirizine      Take 10 mg by mouth At Bedtime        * Notice:  This list has 3 medication(s) that are the same as other medications prescribed for you. Read the directions carefully, and ask your doctor or other care provider to review them with you.

## 2018-10-19 NOTE — PROGRESS NOTES
Preliminary Device Interrogation Results.  Final physician signed paceart report to be scanned and attached.    Patient seen in [facility]  for evaluation and iterative programming of [employer] Advisa DR lead pacemaker per MD orders.  Patient is scheduled for external cardioversion today. Normal pacemaker function.  1 AT/AF episode recorded that has been in progress for 25 days.  Intrinsic rhythm = AF with Ventricular Response @ 56-62 bpm.  AP = 23.9%.   = 62.8%. Estimated battery longevity to ZENAIDA = 6.5 years.  Patient tolerated procedure and displayed ApVs rhythm @ 60 bpm. It was recommended not to check Atrial thresholds post procedure by Saloni Plata NP. Device is set with Capture Management and will run test during the night. With intrinsic conduction, it appears that the device is capturing appropriately. Plan for patient to return to device clinic Nov 7th 2018 in conjunction with seeing Dr. Carrasquillo.    dual lead pacemaker

## 2018-10-22 ENCOUNTER — TELEPHONE (OUTPATIENT)
Dept: CARDIOLOGY | Facility: CLINIC | Age: 83
End: 2018-10-22

## 2018-10-22 NOTE — TELEPHONE ENCOUNTER
Reviewed below information with ARTEM Lui. She recommends no changes at this time. Continue to monitor and call with worsening dizziness or any weight gain. I reviewed this with Arjun who states understanding.   He wanted to make sure we are aware of a couple of things. First - his mom has been super active and on her feet a lot. They try to tell her to balance in some rest but she likes to keep busy.   Second - she called them on Saturday when her feet were swollen and asked if she should take an extra dose of Amiodarone for the swelling. They told her no but she admitted to them that she took an extra dose. Reiterated that Amio was for her afib and not for swelling. He states they did tell her this. I told him to tell her to make sure to call us for any symptoms so we can advise on medication changes. Do not take extra medication without talking to RN .  I also told him I would let Rochelle Agrawal know about this so we can discuss next week when she is in clinic.

## 2018-10-22 NOTE — TELEPHONE ENCOUNTER
[facility] Call Center    Phone Message    May a detailed message be left on voicemail: yes    Reason for Call: Other: Patient son has questions about mother and swollen ankles.      Action Taken: Message routed to:  [facility] ([facility] Heart

## 2018-10-22 NOTE — TELEPHONE ENCOUNTER
Called Arjun back. He conferenced in Linda at home. She states her feet and ankles have been more swollen since Saturday. Overall her weight is up 1 pound since we saw her on Thursday. No changes to her breathing. Did get SOB while talking to me but states that is also from her lung disease and is not new. Did recently start on Amlodipine as well.      Her other complaint is she woke up dizzy this morning. Not sure what is causing that. Her PT came out this morning. She checked her BP twice this morning. Right before her BP meds she was 166/83 with HR 59. When her PT got there her BP was 126/78 with HR 64. The dizziness mostly occurs when she is up moving around. Advised her to make sure she is drinking enough fluids, can have up to 2 Liters/day. She states she will drink a little additional this morning.   Her son wanted us to know that she was much more active this weekend. She did a lot of fall cleaning and made some meals which is more active than normal for her.   I advised her to take it easy today, drink some fluids, and elevate feet when she's sitting down. She states understanding.   I told Linda and her son I would review this information with Rochelle and call back with any recommendations.

## 2018-10-24 ENCOUNTER — TELEPHONE (OUTPATIENT)
Dept: CARDIOLOGY | Facility: CLINIC | Age: 83
End: 2018-10-24

## 2018-10-24 ENCOUNTER — INFUSION THERAPY VISIT (OUTPATIENT)
Dept: INFUSION THERAPY | Facility: CLINIC | Age: 83
End: 2018-10-24
Payer: MEDICARE

## 2018-10-24 VITALS
DIASTOLIC BLOOD PRESSURE: 73 MMHG | BODY MASS INDEX: 27.45 KG/M2 | RESPIRATION RATE: 18 BRPM | SYSTOLIC BLOOD PRESSURE: 116 MMHG | WEIGHT: 159.9 LBS | OXYGEN SATURATION: 98 % | HEART RATE: 61 BPM | TEMPERATURE: 97.9 F

## 2018-10-24 DIAGNOSIS — I50.30 HEART FAILURE WITH PRESERVED EJECTION FRACTION (H): Primary | ICD-10-CM

## 2018-10-24 DIAGNOSIS — M05.79 RHEUMATOID ARTHRITIS INVOLVING MULTIPLE SITES WITH POSITIVE RHEUMATOID FACTOR (H): Primary | ICD-10-CM

## 2018-10-24 PROCEDURE — 99207 ZZC NO CHARGE LOS: CPT

## 2018-10-24 PROCEDURE — 96365 THER/PROPH/DIAG IV INF INIT: CPT | Performed by: INTERNAL MEDICINE

## 2018-10-24 RX ADMIN — Medication 250 ML: at 09:20

## 2018-10-24 NOTE — PROGRESS NOTES
Infusion Nursing Note:  Linda Spicer presents today for Orencia.    Patient seen by provider today: No   present during visit today: Not Applicable.    Note: N/A.    Intravenous Access:  Peripheral IV placed.    Treatment Conditions:  ~~~ NOTE: If the patient answers yes to any of the questions below, hold the infusion and contact ordering provider or on-call provider.    1. Have you recently had an elevated temperature, fever, chills, productive cough, coughing for 3 weeks or longer or hemoptysis,  abnormal vital signs, night sweats,  chest pain or have you noticed a decrease in your appetite, unexplained weight loss or fatigue? No  2. Do you have any open wounds or new incisions? No  3. Do you have any recent or upcoming hospitalizations, surgeries or dental procedures? Yes, In hospital Oct 8-12.  Had last cardioversion Oct 16th.  No surgeries or wounds  4. Do you currently have or recently have had any signs of illness or infection or are you on any antibiotics? No  5. Have you had any new, sudden or worsening abdominal pain? No  6. Have you or anyone in your household received a live vaccination in the past 4 weeks? Please note:  No live vaccines while on biologic/chemotherapy until 6 months after the last treatment.  Patient can receive the flu vaccine (shot only) and the pneumovax.  It is optimal for the patient to get these vaccines mid cycle, but they can be given at any time as long as it is not on the day of the infusion. No  7. Have you recently been diagnosed with any new nervous system diseases (ie. Multiple sclerosis, Guillain [place], seizures, neurological changes) or cancer diagnosis? Are you on any form of radiation or chemotherapy? No  8. Are you pregnant or breast feeding or do you have plans of pregnancy in the future? No  9. Have you been having any signs of worsening depression or suicidal ideations?  (benlysta only) No  10. Have there been any other new onset medical symptoms?  No        Post Infusion Assessment:  Patient tolerated infusion without incident.  Site patent and intact, free from redness, edema or discomfort.  Access discontinued per protocol.    Discharge Plan:    Patient will return 11/26/18 for next appointment.   Patient discharged in stable condition accompanied by: daughter.  Departure Mode: walker.    Isidra Rojas RN

## 2018-10-24 NOTE — TELEPHONE ENCOUNTER
Patient called wanting to know if she can use compression stockings.  States that she is experiencing swelling in both her ankles.  Feels it has worsened since she started amlodipine.  The swelling gets better in the morning.  she is able to put her shoes on and wear them with no problem.  Weighs herself daily and since she was seen by Rochelle she has gained 1 pound.  She is usually 154.4 pounds and she is now 155.4 pounds.  She has been watching her salt intake and trying to remember to elevate her lower extremities when sitting.  States she feels much better since her cardioversion and is very pleased.      Writer advised for her to try compression stockings to help with the swelling and encouraged to continue a low salt diet and elevate the lower extremities at rest.  No medication changes at this time and to update cardiology if edema does not improve.  Patient is agreeable with the plan and verbalized  Understanding.    Dina Swan RN  Care Coordinator  [facility] [place] [facility]  [phone]

## 2018-10-24 NOTE — MR AVS SNAPSHOT
After Visit Summary   10/24/2018    Linda Spicer    MRN: [id number]           Patient Information     Date Of Birth          6/13/1931        Visit Information        Provider Department      10/24/2018 9:00 AM [address]        Today's Diagnoses     Rheumatoid arthritis involving multiple sites with positive rheumatoid factor (H)    -  1       Follow-ups after your visit        Your next 10 appointments already scheduled     Oct 25, 2018  8:00 AM CDT   TELEMEDICINE with Nelida Patel RPH   [facility] ([facility])    [address] [place] [id number] [phone]           Note: this is not an onsite visit; there is no need to come to the facility.            Oct 29, 2018  9:30 AM CDT   LAB with BECKY LAB   [facility]    [address] [id number] [phone]           Please do not eat 10-12 hours before your appointment if you are coming in fasting for labs on lipids, cholesterol, or glucose (sugar). This does not apply to pregnant women. Water, hot tea and black coffee (with nothing added) are okay. Do not drink other fluids, diet soda or chew gum.            Oct 29, 2018 10:00 AM CDT   Office Visit with Tre Lockett MD   [facility] ([facility])    [address] [id number] [phone]           Bring a current list of meds and any records pertaining to this visit. For Physicals, please bring immunization records and any forms needing to be filled out. Please arrive 10 minutes early to complete paperwork.            Nov 01, 2018  9:30 AM CDT   Core Return with Priscilla Agrawal PA-C   [facility] ([facility])    [address] [place] [address] 2nd Floor  [facility] [id number] [phone]             Nov 21, 2018  8:30 AM CST   Lab with UC LAB    [facility]    [address]  1st Floor  [facility] [address]   [phone]            Nov 21, 2018  9:00 AM CST   FULL PULMONARY FUNCTION with  PFL C   [facility] Pulmonary Function Testing ([facility])    [address]  3rd Floor  [facility] [address]   [phone]            Nov 21, 2018 10:30 AM CST   (Arrive by 10:15 AM)   Pacemaker Check with  Cv Device 1   [facility] ([facility])    [address]  3rd Floor  [address]               Nov 21, 2018 11:30 AM CST   (Arrive by 11:15 AM)   RETURN ARRHYTHMIA with Franki Carrasquillo MD   [facility] ([facility])    [address]  [address]  [facility] [address] [phone]            Nov 26, 2018  1:00 PM CST   Level 1 with BAY 8 INFUSION   [facility]    [address] [place] [id number] [phone]            Dec 11, 2018 10:00 AM CST   Office Visit with PFT LAB   [facility]    [address] [place] [id number] [phone]           Bring a current list of meds and any records pertaining to this visit. For Physicals, please bring immunization records and any forms needing to be filled out. Please arrive 10 minutes early to complete paperwork.              Who to contact     If you have questions or need follow up information about today's clinic visit or your schedule please contact [facility] directly at [phone].  Normal or non-critical lab and imaging results will be communicated to you by MyChart, letter or phone within 4 business days after the clinic has received the results. If you do not hear from us within 7 days, please contact the clinic through MyChart or phone. If you have a  critical or abnormal lab result, we will notify you by phone as soon as possible.  Submit refill requests through Direct Access Software or call your pharmacy and they will forward the refill request to us. Please allow 3 business days for your refill to be completed.          Additional Information About Your Visit        PiPsportshart Information     Direct Access Software gives you secure access to your electronic health record. If you see a primary care provider, you can also send messages to your care team and make appointments. If you have questions, please call your primary care clinic.  If you do not have a primary care provider, please call [phone] and they will assist you.      Direct Access Software is an electronic gateway that provides easy, online access to your medical records. With Direct Access Software, you can request a clinic appointment, read your test results, renew a prescription or communicate with your care team.     To access your existing account, please contact your [facility] or call [phone] for assistance.        Care EveryWhere ID     This is your Care EveryWhere ID. This could be used by other organizations to access your [facility] medical records  [id number]        Your Vitals Were     Pulse Temperature Respirations Last Period Pulse Oximetry BMI (Body Mass Index)    61 97.9  F (36.6  C) (Oral) 18 (LMP Unknown) 98% 27.45 kg/m2       Blood Pressure from Last 3 Encounters:   10/24/18 116/73   10/18/18 138/82   10/16/18 121/50    Weight from Last 3 Encounters:   10/24/18 72.5 kg (159 lb 14.4 oz)   10/18/18 72.6 kg (160 lb)   10/16/18 69.9 kg (154 lb)              Today, you had the following     No orders found for display       Primary Care Provider Office Phone # Fax #    Tre Lockett [phone]       [address]  [facility] [address]        Goals        General    #1 I will complete my health care directive. (pt-stated)     Notes - Note edited  8/21/2018 10:39 AM by Behl,  Sena HOPKINS RN    Goal Statement: I will complete my health care directive.  Measure of Success: Health care directive will be completed and scanned into chart.  Supportive Steps to Achieve: Patient has attended a health care directive class, 10/24/17 informed of CPR vs intubation  Barriers: is awaiting to see her  to finalize document  Strengths: has care coordination, home care and excellent family support  Date to Achieve By: 12/31/18  Patient expressed understanding of goal: yes               Equal Access to Services     [facility] : Hadii aad ku hadasho Soomaali, waaxda luqadaha, qaybta kaalmada adeegyada, waxay idiin hayaan juventino sylvainaddy lacristi . So [facility] [phone].    ATENCIÓN: Si habla español, tiene a merchant disposición servicios gratuitos de asistencia lingüística. Llame al [phone].    We comply with applicable federal civil rights laws and [place] laws. We do not discriminate on the basis of race, color, national origin, age, disability, sex, sexual orientation, or gender identity.            Thank you!     Thank you for choosing [facility]  for your care. Our goal is always to provide you with excellent care. Hearing back from our patients is one way we can continue to improve our services. Please take a few minutes to complete the written survey that you may receive in the mail after your visit with us. Thank you!             Your Updated Medication List - Protect others around you: Learn how to safely use, store and throw away your medicines at www.disposemymeds.org.          This list is accurate as of 10/24/18 10:05 AM.  Always use your most recent med list.                   Brand Name Dispense Instructions for use Diagnosis    ACETAMINOPHEN PO      Take 1,000 mg by mouth 2 times daily as needed        albuterol (2.5 MG/3ML) 0.083% neb solution      Take 1 vial by nebulization every 6 hours as needed for shortness of breath / dyspnea or wheezing        amiodarone 200  MG tablet    PACERONE/CODARONE    120 tablet    1st week: 400mg twice a day. 2nd week: 200mg twice a day. Then 200mg daily    Paroxysmal atrial fibrillation (H)       amLODIPine 5 MG tablet    NORVASC    90 tablet    Take 1 tablet (5 mg) by mouth daily    Hypertension goal BP (blood pressure) < 150/90       apixaban ANTICOAGULANT 2.5 MG tablet    ELIQUIS    180 tablet    Take 1 tablet (2.5 mg) by mouth 2 times daily    Atrial flutter, paroxysmal (H)       azaTHIOprine 50 MG tablet    IMURAN    90 tablet    Take 1 tablet (50 mg) by mouth daily    Rheumatoid arthritis involving multiple sites with positive rheumatoid factor (H)       Blood Pressure Monitor Kit     1 kit    1 kit daily as needed    CHF (congestive heart failure) (H)       calcium carbonate 600 mg-vitamin D 400 units 600-400 MG-UNIT per tablet    CALTRATE     Take 1 tablet by mouth 2 times daily        clotrimazole 1 % cream    LOTRIMIN    50 g    Place 1 Applicatorful vaginally daily    Vaginitis and vulvovaginitis       COMPOUNDED NON-CONTROLLED SUBSTANCE - PHARMACY TO MIX COMPOUNDED MEDICATION    CMPD RX    30 g    Estriol 1mg/gram. Place 1 gram vaginally daily for two weeks, then vaginally twice weekly after.    Atrophic vaginitis       fish oil-omega-3 fatty acids 1000 MG capsule      Take 2 g by mouth daily. 2 capsules daily        folic acid 1 MG tablet    FOLVITE    90 tablet    Take 1 tablet (1 mg) by mouth daily    Oral aphthae       furosemide 40 MG tablet    LASIX    60 tablet    Take 0.5 tablets (20 mg) by mouth 2 times daily    Heart failure with preserved ejection fraction, NYHA class I (H)       GENTEAL MILD OP      Apply  to eye daily.        hydrocortisone 2.5 % cream     30 g    Apply BID to affected region(s) for 7-10 days.    Rash       levothyroxine 75 MCG tablet    SYNTHROID/LEVOTHROID    90 tablet    TAKE ONE TABLET BY MOUTH ONCE DAILY    Hypothyroidism, unspecified type       metoprolol succinate 25 MG 24 hr tablet    TOPROL-XL     90 tablet    Take 1 tablet (25 mg) by mouth daily    Hypertension goal BP (blood pressure) < 140/90       nitroGLYcerin 0.4 MG sublingual tablet    NITROSTAT    25 tablet    Place 1 tablet (0.4 mg) under the tongue every 5 minutes as needed for chest pain    Chest pain       * order for DME     1 Box    Equipment being ordered: Dispense face mask. Mrs. Spicer is immunosuppressed due to rheumatoid arthritis.    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 Use with Albuterol.    Hypoxia       order for DME     1 each    Equipment being ordered: Dispense baffle, for use with nebulizer.    Pneumonia of left upper lobe due to Mycoplasma pneumoniae       * order for DME     1 each    Dispense SP Walker-small    Pain of toe of right foot, Status post bunionectomy       ORENCIA IV      Inject into the vein every 28 days        PRESERVISION AREDS PO      Take 1 tablet by mouth daily        ranibizumab 0.3 MG/0.05ML Soln    LUCENTIS     0.3 mg by Intravitreal route Every 6 weeks        ranitidine 150 MG tablet    ZANTAC    60 tablet    Take 1 tablet (150 mg) by mouth 2 times daily    Gastroesophageal reflux disease without esophagitis       REFRESH P.M. Oint      Apply  to eye. Daily at bedtime        saccharomyces boulardii 250 MG capsule    FLORASTOR     Take 250 mg by mouth daily        senna-docusate 8.6-50 MG per tablet    SENOKOT-S;PERICOLACE    120 tablet    Take 2 tablets by mouth At Bedtime Hold if diarrhea occurs.    Constipation, chronic       VITAMIN C PO      Take 500 mg by mouth daily        ZYRTEC ALLERGY 10 MG tablet   Generic drug:  cetirizine      Take 10 mg by mouth At Bedtime        * Notice:  This list has 3 medication(s) that are the same as other medications prescribed for you. Read the directions carefully, and ask your doctor or other care provider to review them with you.

## 2018-10-25 ENCOUNTER — ALLIED HEALTH/NURSE VISIT (OUTPATIENT)
Dept: PHARMACY | Facility: CLINIC | Age: 83
End: 2018-10-25
Payer: COMMERCIAL

## 2018-10-25 DIAGNOSIS — E63.9 NUTRITIONAL DEFICIENCY: ICD-10-CM

## 2018-10-25 DIAGNOSIS — I48.0 PAROXYSMAL ATRIAL FIBRILLATION (H): ICD-10-CM

## 2018-10-25 DIAGNOSIS — H04.123 DRY EYES: ICD-10-CM

## 2018-10-25 DIAGNOSIS — M05.79 RHEUMATOID ARTHRITIS INVOLVING MULTIPLE SITES WITH POSITIVE RHEUMATOID FACTOR (H): ICD-10-CM

## 2018-10-25 DIAGNOSIS — K59.09 OTHER CONSTIPATION: ICD-10-CM

## 2018-10-25 DIAGNOSIS — N95.2 ATROPHIC VAGINITIS: ICD-10-CM

## 2018-10-25 DIAGNOSIS — J30.2 SEASONAL ALLERGIC RHINITIS, UNSPECIFIED TRIGGER: ICD-10-CM

## 2018-10-25 DIAGNOSIS — I50.33 ACUTE ON CHRONIC DIASTOLIC CONGESTIVE HEART FAILURE (H): Primary | ICD-10-CM

## 2018-10-25 DIAGNOSIS — E03.8 OTHER SPECIFIED HYPOTHYROIDISM: ICD-10-CM

## 2018-10-25 PROCEDURE — 99607 MTMS BY PHARM ADDL 15 MIN: CPT | Performed by: PHARMACIST

## 2018-10-25 PROCEDURE — 99606 MTMS BY PHARM EST 15 MIN: CPT | Performed by: PHARMACIST

## 2018-10-25 NOTE — PROGRESS NOTES
SUBJECTIVE/OBJECTIVE:                Linda Spicer is a 87 year old female called for a follow-up visit for Medication Therapy Management.  I spoke with her daughter, Xi, who has a consent to communicate on file.  She was referred to me from our transitions of care program.     Chief Complaint: Follow up from our visit on 10/18.  Calling to follow-up on any discharge concerns and discussed supplements.    Tobacco: No tobacco use  Alcohol: A few sips of wine once a week    Medication Adherence/Access:  Patient has assistance with medication administration: assisted living and daughter.    AFib: Pt is taking amiodarone 200mg once per day now, down from twice daily, and has been asymptomatic. Xi realized after last visit that they were instructed to drop dose to once daily so they have corrected since last visit. Pt's ankles are a little swollen but weight has been stable (went up 1 pound total since starting). SOB is stable with pulmonary fibrosis. She is also on Apixaban 2.5mg BID for anticoagulation without complaint of concerning bruising or bleeding.    HFpEF: Pt is currently taking metoprolol 25mg daily, furosemide 20mg BID, and amlodipine 5mg daily.  She takes no NSAIDs. She has not needed her prn albuterol nebs for SOB in last 2 weeks. She is following a low-sodium diet. Weights are stable as listed above.    RA: Pt is taking azathioprine 50mg daily, Orencia injection every 28 days. Pt has not been taking acetaminophen because she does not endorse pain. Pt does have some stiffness but it's tolerable.    Allergies/Dry Eye: Pt is taking Zyrtec nightly. Pt uses artificial tears and ointment daily after glaucoma surgery.    Hypothyroidism: Patient is taking levothyroxine 75 mcg daily early in the morning. Patient is having the following symptoms: none.   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 She completed cloltrimazole  after hospital stay.    Constipation: Pt does occasionally use senna/docusate but has not been using it recently.    Other Supplements: Pt is taking vitamin C because she was advised by a physician at some point to supplement intake (does not remember why). Pt is taking folic acid 1mg daily due to history of mouth sores from Cimzia and leg cramps (is no longer having either issue).  Patient is also taking calcium with vitamin D for bone health.  Patient is also taking fish oil as directed by cardiology for heart health.  Patient is taking PreserVision for macular degeneration and Florastor for history of indigestion issues. Pt does not have complaints of side effects from any of these supplements.    Today's Vitals: LMP  (LMP Unknown)     Last Comprehensive Metabolic Panel:  Sodium   Date Value Ref Range Status   10/16/2018 137 133 - 144 mmol/L Final     Potassium   Date Value Ref Range Status   10/16/2018 4.5 3.4 - 5.3 mmol/L Final     Comment:     Specimen slightly hemolyzed, potassium may be falsely elevated     Chloride   Date Value Ref Range Status   10/16/2018 103 94 - 109 mmol/L Final     Carbon Dioxide   Date Value Ref Range Status   10/16/2018 25 20 - 32 mmol/L Final     Anion Gap   Date Value Ref Range Status   10/16/2018 9 3 - 14 mmol/L Final     Glucose   Date Value Ref Range Status   10/16/2018 98 70 - 99 mg/dL Final     Urea Nitrogen   Date Value Ref Range Status   10/16/2018 47 (H) 7 - 30 mg/dL Final     Creatinine   Date Value Ref Range Status   10/16/2018 1.55 (H) 0.52 - 1.04 mg/dL Final     GFR Estimate   Date Value Ref Range Status   10/16/2018 32 (L) >60 mL/min/1.7m2 Final     Comment:     Non  GFR Calc     Calcium   Date Value Ref Range Status   10/16/2018 8.6 8.5 - 10.1 mg/dL Final         ASSESSMENT:              Current medications were reviewed today as discussed above.      Medication Adherence: good, no issues identified    AFib: Stable.  Current therapy appears appropriate  and patient appears to be asymptomatic.    HFpEF:  Stable.  Patient appears to be asymptomatic.    RA: Stable.    Allergies/Dry Eye: Stable.    Hypothyroidism: Stable.  TSH within normal limits.    Vaginal Atrophy: Stable.  Current therapy appropriate to reduce risk of UTI, which patient has a history of.    Constipation: Stable.    Other Supplements: Needs improvement.  Patient is currently taking vitamin C both independently and and her PreserVision tablets, so it is unlikely that she needs to take the additional vitamin C on top of her PreserVision.  We discussed that it is unlikely that patient is needing Folic Acid supplementation, especially at 1 mg daily, chronically so it may be ideal to talk to her primary about discontinuing this medication.  Florastor is possibly effective and likely safe.  Current calcium and vitamin D therapy is also likely appropriate, though it looks like pt has not had recent vitamin D level drawn.  Patient will benefits at 2000 mg/day are questionable but unlikely to be harmful.  Discussed with patient that fish oil needs to be stopped prior to major surgeries and patient was aware of this already.     PLAN:                  1.  Patient to consider discontinuing vitamin C supplementation and consider with physician discontinuing folic acid.    2.  Patient to have vitamin D level checked with primary care.  She was comfortable asking primary care about this.    I spent 45 minutes with this patient today. All changes were made via collaborative practice agreement with Dr. Lockett. A copy of the visit note was provided to the patient's primary care provider.     Will follow up upon patient request, as Xi would prefer to just call if she needs anything in the future.    The patient was mailed a summary of these recommendations as an after visit summary.    Chart documentation was completed in part with Dragon voice-recognition software. Even though reviewed, some grammatical, spelling,  and word errors may remain.    Nelida Patel PharmD, [facility]  Medication Therapy Management Provider  Phone: [phone]  [email or web address]

## 2018-10-25 NOTE — PATIENT INSTRUCTIONS
Recommendations from today's MTM visit:                                                      1. Preservision contains 500mg vitamin C so it is unlikely that you need more supplemental vitamin C.    2. It may be a good idea to check vitamin D. Discuss this with your primary at your next regularly scheduled visit.    3. See whether folate is necessary now that you are not having mouth sores and may not be having leg cramps anymore. 400mcg or 0.4mg is a more typical daily recommended dose and likely could be obtained through diet (beans, lentils, vegetables are all high in folate).    Next MTM visit: Call anytime with questions or to set up another appointment.    To schedule another MTM appointment, please call the clinic directly or you may call the MTM scheduling line at [phone] or toll-free at [phone].     My Clinical Pharmacist's contact information:                                                      It was a pleasure talking with you today!  Please feel free to contact me with any questions or concerns you have.      Nelida Patel PharmD, [facility]  Medication Therapy Management Provider  Phone: [phone]  [email or web address]    You may receive a survey about the MTM services you received.  I would appreciate your feedback to help me serve you better in the future. Please fill it out and return it when you can. Your comments will be anonymous.

## 2018-10-25 NOTE — MR AVS SNAPSHOT
After Visit Summary   10/25/2018    Linda Spicer    MRN: [id number]           Patient Information     Date Of Birth          6/13/1931        Visit Information        Provider Department      10/25/2018 8:00 AM Nelida Patel, [facility] MTM        Today's Diagnoses     Acute on chronic diastolic congestive heart failure (H)    -  1    Paroxysmal atrial fibrillation (H)        Nutritional deficiency        Atrophic vaginitis        Rheumatoid arthritis involving multiple sites with positive rheumatoid factor (H)        Seasonal allergic rhinitis, unspecified trigger        Dry eyes        Other specified hypothyroidism        Other constipation          Care Instructions    Recommendations from today's MTM visit:                                                      1. Preservision contains 500mg vitamin C so it is unlikely that you need more supplemental vitamin C.    2. It may be a good idea to check vitamin D. Discuss this with your primary at your next regularly scheduled visit.    3. See whether folate is necessary now that you are not having mouth sores and may not be having leg cramps anymore. 400mcg or 0.4mg is a more typical daily recommended dose and likely could be obtained through diet (beans, lentils, vegetables are all high in folate).    Next MTM visit: Call anytime with questions or to set up another appointment.    To schedule another MTM appointment, please call the clinic directly or you may call the MTM scheduling line at [phone] or toll-free at [phone].     My Clinical Pharmacist's contact information:                                                      It was a pleasure talking with you today!  Please feel free to contact me with any questions or concerns you have.      Nelida Patel, PharmD, BCACP  Medication Therapy Management Provider  Phone: [phone]  [email or web address]    You may receive a survey about the MTM services you  received.  I would appreciate your feedback to help me serve you better in the future. Please fill it out and return it when you can. Your comments will be anonymous.                Follow-ups after your visit        Your next 10 appointments already scheduled     Oct 29, 2018  9:30 AM CDT   LAB with BK LAB   [facility] ([facility])    [address] [place] [address]   [phone]           Please do not eat 10-12 hours before your appointment if you are coming in fasting for labs on lipids, cholesterol, or glucose (sugar). This does not apply to pregnant women. Water, hot tea and black coffee (with nothing added) are okay. Do not drink other fluids, diet soda or chew gum.            Oct 29, 2018 10:00 AM CDT   Office Visit with Tre Lockett MD   [facility] ([facility])    [address] [place] [address]   [phone]           Bring a current list of meds and any records pertaining to this visit. For Physicals, please bring immunization records and any forms needing to be filled out. Please arrive 10 minutes early to complete paperwork.            Nov 01, 2018  9:30 AM CDT   Core Return with Priscilla Agrawal PA-C   [facility] PHYSICIANS HEART AT [facility] ([facility])    [address] [place] [address] 2nd [facility] [address]   [phone]            Nov 21, 2018  8:30 AM CST   Lab with HATTIE LAB    [facility] ([facility])    [address] [place] [address]  1st [facility] [address] [phone]            Nov 21, 2018  9:00 AM CST   FULL PULMONARY FUNCTION with  PFL C   [facility] Pulmonary Function Testing ([facility])    [address] [place] [address]  3rd [facility] [address] [phone]            Nov 21, 2018 10:30 AM CST   (Arrive by 10:15 AM)   Pacemaker Check with  Cv Device 1   [facility]  Heart Care ([facility])    [address]  3rd Floor  [address]               Nov 21, 2018 11:30 AM CST   (Arrive by 11:15 AM)   RETURN ARRHYTHMIA with Franki Carrasquillo MD   [facility] ([facility])    [address] [facility]  [address]  [facility] [id number] [phone]            Nov 26, 2018  1:00 PM CST   Level 1 with BAY 8 INFUSION   [facility] ([facility])    [address] [place] [phone]            Dec 11, 2018 10:00 AM CST   Office Visit with PFT LAB   [facility])    [address] [place] [phone]           Bring a current list of meds and any records pertaining to this visit. For Physicals, please bring immunization records and any forms needing to be filled out. Please arrive 10 minutes early to complete paperwork.            Dec 11, 2018 11:00 AM CST   Return Visit with Dea Acosta MD   [facility] ([facility])    [address] [place] [id number] [phone]              Future tests that were ordered for you today     Open Future Orders        Priority Expected Expires Ordered    Basic metabolic panel Routine 10/29/2018 10/24/2019 10/24/2018            Who to contact     If you have questions or need follow up information about today's clinic visit or your schedule please contact [facility] directly at [phone].  Normal or non-critical lab and imaging results will be communicated to you by MyChart, letter or phone within 4 business days after the clinic has received the results. If you do not hear from us within 7 days, please contact the clinic through MyChart or phone. If you have a critical or abnormal lab result, we will notify you by phone as soon as possible.  Submit refill requests through GreenWizardhart or call  your pharmacy and they will forward the refill request to us. Please allow 3 business days for your refill to be completed.          Additional Information About Your Visit        aConhart Information     uTrack TV gives you secure access to your electronic health record. If you see a primary care provider, you can also send messages to your care team and make appointments. If you have questions, please call your primary care clinic.  If you do not have a primary care provider, please call [phone] and they will assist you.        Care EveryWhere ID     This is your Care EveryWhere ID. This could be used by other organizations to access your [place] medical records  [id number]        Your Vitals Were     Last Period                   (LMP Unknown)            Blood Pressure from Last 3 Encounters:   10/24/18 116/73   10/18/18 138/82   10/16/18 121/50    Weight from Last 3 Encounters:   10/24/18 159 lb 14.4 oz (72.5 kg)   10/18/18 160 lb (72.6 kg)   10/16/18 154 lb (69.9 kg)              Today, you had the following     No orders found for display       Primary Care Provider Office Phone # Fax #    Tre Jenny [place] [phone]       [address]  [place] [address]        Goals        General    #1 I will complete my health care directive. (pt-stated)     Notes - Note edited  8/21/2018 10:39 AM by Behl, Melissa K, RN    Goal Statement: I will complete my health care directive.  Measure of Success: Health care directive will be completed and scanned into chart.  Supportive Steps to Achieve: Patient has attended a health care directive class, 10/24/17 informed of CPR vs intubation  Barriers: is awaiting to see her  to finalize document  Strengths: has care coordination, home care and excellent family support  Date to Achieve By: 12/31/18  Patient expressed understanding of goal: yes               Equal Access to Services     [facility] AH: zofia Harrell  kai bertosabihaiglesia nayanazahraa manzano ah. So [facility] [phone].    ATENCIÓN: Si rik jessica, tiene a merchant disposición servicios gratuitos de asistencia lingüística. Alin al [phone].    We comply with applicable federal civil rights laws and [place] laws. We do not discriminate on the basis of race, color, national origin, age, disability, sex, sexual orientation, or gender identity.            Thank you!     Thank you for choosing [facility]  for your care. Our goal is always to provide you with excellent care. Hearing back from our patients is one way we can continue to improve our services. Please take a few minutes to complete the written survey that you may receive in the mail after your visit with us. Thank you!             Your Updated Medication List - Protect others around you: Learn how to safely use, store and throw away your medicines at www.disposemymeds.org.          This list is accurate as of 10/25/18  1:57 PM.  Always use your most recent med list.                   Brand Name Dispense Instructions for use Diagnosis    ACETAMINOPHEN PO      Take 1,000 mg by mouth 2 times daily as needed        albuterol (2.5 MG/3ML) 0.083% neb solution      Take 1 vial by nebulization every 6 hours as needed for shortness of breath / dyspnea or wheezing        amiodarone 200 MG tablet    PACERONE/CODARONE    120 tablet    1st week: 400mg twice a day. 2nd week: 200mg twice a day. Then 200mg daily    Paroxysmal atrial fibrillation (H)       amLODIPine 5 MG tablet    NORVASC    90 tablet    Take 1 tablet (5 mg) by mouth daily    Hypertension goal BP (blood pressure) < 150/90       apixaban ANTICOAGULANT 2.5 MG tablet    ELIQUIS    180 tablet    Take 1 tablet (2.5 mg) by mouth 2 times daily    Atrial flutter, paroxysmal (H)       azaTHIOprine 50 MG tablet    IMURAN    90 tablet    Take 1 tablet (50 mg) by mouth daily    Rheumatoid arthritis involving  multiple sites with positive rheumatoid factor (H)       Blood Pressure Monitor Kit     1 kit    1 kit daily as needed    CHF (congestive heart failure) (H)       calcium carbonate 600 mg-vitamin D 400 units 600-400 MG-UNIT per tablet    CALTRATE     Take 1 tablet by mouth 2 times daily        COMPOUNDED NON-CONTROLLED SUBSTANCE - PHARMACY TO MIX COMPOUNDED MEDICATION    CMPD RX    30 g    Estriol 1mg/gram. Place 1 gram vaginally daily for two weeks, then vaginally twice weekly after.    Atrophic vaginitis       fish oil-omega-3 fatty acids 1000 MG capsule      Take 2 g by mouth daily. 2 capsules daily        folic acid 1 MG tablet    FOLVITE    90 tablet    Take 1 tablet (1 mg) by mouth daily    Oral aphthae       furosemide 40 MG tablet    LASIX    60 tablet    Take 0.5 tablets (20 mg) by mouth 2 times daily    Heart failure with preserved ejection fraction, NYHA class I (H)       GENTEAL MILD OP      Apply  to eye daily.        hydrocortisone 2.5 % cream     30 g    Apply BID to affected region(s) for 7-10 days.    Rash       levothyroxine 75 MCG tablet    SYNTHROID/LEVOTHROID    90 tablet    TAKE ONE TABLET BY MOUTH ONCE DAILY    Hypothyroidism, unspecified type       metoprolol succinate 25 MG 24 hr tablet    TOPROL-XL    90 tablet    Take 1 tablet (25 mg) by mouth daily    Hypertension goal BP (blood pressure) < 140/90       nitroGLYcerin 0.4 MG sublingual tablet    NITROSTAT    25 tablet    Place 1 tablet (0.4 mg) under the tongue every 5 minutes as needed for chest pain    Chest pain       * order for DME     1 Box    Equipment being ordered: Dispense face mask. Mrs. Spicer is immunosuppressed due to rheumatoid arthritis.    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 Use with Albuterol.    Hypoxia       order for DME     1 each    Equipment being ordered: Dispense baffle, for use with nebulizer.     Pneumonia of left upper lobe due to Mycoplasma pneumoniae       * order for DME     1 each    Dispense SP Walker-small    Pain of toe of right foot, Status post bunionectomy       ORENCIA IV      Inject into the vein every 28 days        PRESERVISION AREDS PO      Take 1 tablet by mouth daily        ranibizumab 0.3 MG/0.05ML Soln    LUCENTIS     0.3 mg by Intravitreal route Every 6 weeks        ranitidine 150 MG tablet    ZANTAC    60 tablet    Take 1 tablet (150 mg) by mouth 2 times daily    Gastroesophageal reflux disease without esophagitis       REFRESH P.M. Oint      Apply  to eye. Daily at bedtime        saccharomyces boulardii 250 MG capsule    FLORASTOR     Take 250 mg by mouth daily        senna-docusate 8.6-50 MG per tablet    SENOKOT-S;PERICOLACE    120 tablet    Take 2 tablets by mouth At Bedtime Hold if diarrhea occurs.    Constipation, chronic       VITAMIN C PO      Take 500 mg by mouth daily        ZYRTEC ALLERGY 10 MG tablet   Generic drug:  cetirizine      Take 10 mg by mouth At Bedtime        * Notice:  This list has 3 medication(s) that are the same as other medications prescribed for you. Read the directions carefully, and ask your doctor or other care provider to review them with you.

## 2018-10-29 ENCOUNTER — OFFICE VISIT (OUTPATIENT)
Dept: FAMILY MEDICINE | Facility: CLINIC | Age: 83
End: 2018-10-29
Payer: MEDICARE

## 2018-10-29 VITALS
SYSTOLIC BLOOD PRESSURE: 132 MMHG | WEIGHT: 160 LBS | RESPIRATION RATE: 16 BRPM | DIASTOLIC BLOOD PRESSURE: 75 MMHG | TEMPERATURE: 98.6 F | HEART RATE: 63 BPM | HEIGHT: 64 IN | OXYGEN SATURATION: 97 % | BODY MASS INDEX: 27.31 KG/M2

## 2018-10-29 DIAGNOSIS — I50.32 CHRONIC DIASTOLIC CONGESTIVE HEART FAILURE (H): Primary | ICD-10-CM

## 2018-10-29 DIAGNOSIS — I50.30 HEART FAILURE WITH PRESERVED EJECTION FRACTION (H): ICD-10-CM

## 2018-10-29 LAB
ANION GAP SERPL CALCULATED.3IONS-SCNC: 8 MMOL/L (ref 3–14)
BUN SERPL-MCNC: 36 MG/DL (ref 7–30)
CALCIUM SERPL-MCNC: 9 MG/DL (ref 8.5–10.1)
CHLORIDE SERPL-SCNC: 101 MMOL/L (ref 94–109)
CO2 SERPL-SCNC: 31 MMOL/L (ref 20–32)
CREAT SERPL-MCNC: 1.58 MG/DL (ref 0.52–1.04)
GFR SERPL CREATININE-BSD FRML MDRD: 31 ML/MIN/1.7M2
GLUCOSE SERPL-MCNC: 141 MG/DL (ref 70–99)
NT-PROBNP SERPL-MCNC: 3154 PG/ML (ref 0–450)
POTASSIUM SERPL-SCNC: 4.2 MMOL/L (ref 3.4–5.3)
SODIUM SERPL-SCNC: 140 MMOL/L (ref 133–144)

## 2018-10-29 PROCEDURE — 36415 COLL VENOUS BLD VENIPUNCTURE: CPT | Performed by: INTERNAL MEDICINE

## 2018-10-29 PROCEDURE — 99214 OFFICE O/P EST MOD 30 MIN: CPT | Performed by: INTERNAL MEDICINE

## 2018-10-29 PROCEDURE — 80048 BASIC METABOLIC PNL TOTAL CA: CPT | Performed by: PHYSICIAN ASSISTANT

## 2018-10-29 PROCEDURE — 83880 ASSAY OF NATRIURETIC PEPTIDE: CPT | Performed by: INTERNAL MEDICINE

## 2018-10-29 NOTE — MR AVS SNAPSHOT
After Visit Summary   10/29/2018    Linda Spicer    MRN: [id number]           Patient Information     Date Of Birth          6/13/1931        Visit Information        Provider Department      10/29/2018 10:00 AM Tre [place] MD [facility]        Today's Diagnoses     Chronic diastolic congestive heart failure (H)    -  1    Mitral valve insufficiency, unspecified etiology        Chronic atrial fibrillation (H)           Follow-ups after your visit        Your next 10 appointments already scheduled     Nov 01, 2018  9:30 AM CDT   Core Return with Priscilla Agrawal PA-C   [facility] PHYSICIANS HEART AT [facility] ([facility])    [address] [place] [address] 2nd Floor  [facility] [address] [phone]            Nov 21, 2018  8:30 AM CST   Lab with UC LAB   [facility] ([facility])    [address] [place] [id number] [phone]            Nov 21, 2018  9:00 AM CST   FULL PULMONARY FUNCTION with UC PFL C   [facility] Pulmonary Function Testing ([facility])    [address] [place] [address]  3rd [facility] [id number] [phone]            Nov 21, 2018 10:30 AM CST   (Arrive by 10:15 AM)   Pacemaker Check with  Cv Device 1   [facility] ([facility])    [address] [place] [address]  [phone]               Nov 21, 2018 11:30 AM CST   (Arrive by 11:15 AM)   RETURN ARRHYTHMIA with Franki Carrasquillo MD   [facility] ([facility])    [address] [place] [address]  [address] [place] [phone] [phone]            Nov 26, 2018  1:00 PM CST   Level 1 with [place] 8 [facility] ([facility])    [address] [place] [phone]            Dec 11, 2018 10:00 AM CST   Office Visit with PFT LAB   [facility] ([facility])    [address] [place] [address]   [phone]           Bring a current list of meds and any records pertaining to this visit. For Physicals, please bring immunization records and any forms needing to be filled out. Please arrive 10 minutes early to complete paperwork.            Dec 11, 2018 11:00 AM CST   Return Visit with Dea Acosta MD   [facility] ([facility])    [address] [place] [address]   [phone]            Dec 27, 2018  9:00 AM CST   Level 1 with [address] ([facility])    [address] [place] [address]   [phone]            Jim 15, 2019  2:40 PM CST   Return Visit with Mario Davenport MD   [facility] ([facility])    [address] [facility] [phone] [phone]              Who to contact     If you have questions or need follow up information about today's clinic visit or your schedule please contact [facility] directly at [phone].  Normal or non-critical lab and imaging results will be communicated to you by TranslateMediahart, letter or phone within 4 business days after the clinic has received the results. If you do not hear from us within 7 days, please contact the clinic through TranslateMediahart or phone. If you have a critical or abnormal lab result, we will notify you by phone as soon as possible.  Submit refill requests through InStore Audio Network or call your pharmacy and they will forward the refill request to us. Please allow 3 business days for your refill to be completed.          Additional Information About Your Visit        InStore Audio Network Information     [employer] Network gives you secure access to your electronic health record. If you see a primary care provider, you can also send messages to your care team and make appointments. If you have questions, please call your primary  "care clinic.  If you do not have a primary care provider, please call [phone] and they will assist you.        Care EveryWhere ID     This is your Care EveryWhere ID. This could be used by other organizations to access your [place] medical records  [id number]        Your Vitals Were     Pulse Temperature Respirations Height Last Period Pulse Oximetry    63 98.6  F (37  C) (Oral) 16 1.626 m (5' 4\") (LMP Unknown) 97%    BMI (Body Mass Index)                   27.46 kg/m2            Blood Pressure from Last 3 Encounters:   10/29/18 132/75   10/24/18 116/73   10/18/18 138/82    Weight from Last 3 Encounters:   10/29/18 72.6 kg (160 lb)   10/24/18 72.5 kg (159 lb 14.4 oz)   10/18/18 72.6 kg (160 lb)              We Performed the Following     BNP-N terminal pro        Primary Care Provider Office Phone # Fax #    Tre [place] [phone]       [address] [facility] [address]        Goals        General    #1 I will complete my health care directive. (pt-stated)     Notes - Note edited  8/21/2018 10:39 AM by Behl, Melissa K, RN    Goal Statement: I will complete my health care directive.  Measure of Success: Health care directive will be completed and scanned into chart.  Supportive Steps to Achieve: Patient has attended a health care directive class, 10/24/17 informed of CPR vs intubation  Barriers: is awaiting to see her  to finalize document  Strengths: has care coordination, home care and excellent family support  Date to Achieve By: 12/31/18  Patient expressed understanding of goal: yes               Equal Access to Services     [facility] S[facility] AH: zofia Harrell, zahraa ramos. So [facility] [phone].    ATENCIÓN: Si habla español, tiene a merchant disposición servicios gratuitos de asistencia lingüística. Llame al [phone].    We comply with applicable federal civil rights laws and [place] " laws. We do not discriminate on the basis of race, color, national origin, age, disability, sex, sexual orientation, or gender identity.            Thank you!     Thank you for choosing [facility]  for your care. Our goal is always to provide you with excellent care. Hearing back from our patients is one way we can continue to improve our services. Please take a few minutes to complete the written survey that you may receive in the mail after your visit with us. Thank you!             Your Updated Medication List - Protect others around you: Learn how to safely use, store and throw away your medicines at www.disposemymeds.org.          This list is accurate as of 10/29/18 10:36 AM.  Always use your most recent med list.                   Brand Name Dispense Instructions for use Diagnosis    ACETAMINOPHEN PO      Take 1,000 mg by mouth 2 times daily as needed        albuterol (2.5 MG/3ML) 0.083% neb solution      Take 1 vial by nebulization every 6 hours as needed for shortness of breath / dyspnea or wheezing        amiodarone 200 MG tablet    PACERONE/CODARONE    120 tablet    1st week: 400mg twice a day. 2nd week: 200mg twice a day. Then 200mg daily    Paroxysmal atrial fibrillation (H)       amLODIPine 5 MG tablet    NORVASC    90 tablet    Take 1 tablet (5 mg) by mouth daily    Hypertension goal BP (blood pressure) < 150/90       apixaban ANTICOAGULANT 2.5 MG tablet    ELIQUIS    180 tablet    Take 1 tablet (2.5 mg) by mouth 2 times daily    Atrial flutter, paroxysmal (H)       azaTHIOprine 50 MG tablet    IMURAN    90 tablet    Take 1 tablet (50 mg) by mouth daily    Rheumatoid arthritis involving multiple sites with positive rheumatoid factor (H)       Blood Pressure Monitor Kit     1 kit    1 kit daily as needed    CHF (congestive heart failure) (H)       calcium carbonate 600 mg-vitamin D 400 units 600-400 MG-UNIT per tablet    CALTRATE     Take 1 tablet by mouth 2 times daily         COMPOUNDED NON-CONTROLLED SUBSTANCE - PHARMACY TO MIX COMPOUNDED MEDICATION    CMPD RX    30 g    Estriol 1mg/gram. Place 1 gram vaginally daily for two weeks, then vaginally twice weekly after.    Atrophic vaginitis       fish oil-omega-3 fatty acids 1000 MG capsule      Take 2 g by mouth daily. 2 capsules daily        folic acid 1 MG tablet    FOLVITE    90 tablet    Take 1 tablet (1 mg) by mouth daily    Oral aphthae       furosemide 40 MG tablet    LASIX    60 tablet    Take 0.5 tablets (20 mg) by mouth 2 times daily    Heart failure with preserved ejection fraction, NYHA class I (H)       GENTEAL MILD OP      Apply  to eye daily.        hydrocortisone 2.5 % cream     30 g    Apply BID to affected region(s) for 7-10 days.    Rash       levothyroxine 75 MCG tablet    SYNTHROID/LEVOTHROID    90 tablet    TAKE ONE TABLET BY MOUTH ONCE DAILY    Hypothyroidism, unspecified type       metoprolol succinate 25 MG 24 hr tablet    TOPROL-XL    90 tablet    Take 1 tablet (25 mg) by mouth daily    Hypertension goal BP (blood pressure) < 140/90       nitroGLYcerin 0.4 MG sublingual tablet    NITROSTAT    25 tablet    Place 1 tablet (0.4 mg) under the tongue every 5 minutes as needed for chest pain    Chest pain       * order for DME     1 Box    Equipment being ordered: Dispense face mask. Mrs. Spicer is immunosuppressed due to rheumatoid arthritis.    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 Use with Albuterol.    Hypoxia       order for DME     1 each    Equipment being ordered: Dispense baffle, for use with nebulizer.    Pneumonia of left upper lobe due to Mycoplasma pneumoniae       * order for DME     1 each    Dispense SP Walker-small    Pain of toe of right foot, Status post bunionectomy       ORENCIA IV      Inject into the vein every 28 days        PRESERVISION AREDS PO      Take 1 tablet by mouth daily         ranibizumab 0.3 MG/0.05ML Soln    LUCENTIS     0.3 mg by Intravitreal route Every 6 weeks        ranitidine 150 MG tablet    ZANTAC    60 tablet    Take 1 tablet (150 mg) by mouth 2 times daily    Gastroesophageal reflux disease without esophagitis       REFRESH P.M. Oint      Apply  to eye. Daily at bedtime        saccharomyces boulardii 250 MG capsule    FLORASTOR     Take 250 mg by mouth daily        senna-docusate 8.6-50 MG per tablet    SENOKOT-S;PERICOLACE    120 tablet    Take 2 tablets by mouth At Bedtime Hold if diarrhea occurs.    Constipation, chronic       VITAMIN C PO      Take 500 mg by mouth daily        ZYRTEC ALLERGY 10 MG tablet   Generic drug:  cetirizine      Take 10 mg by mouth At Bedtime        * Notice:  This list has 3 medication(s) that are the same as other medications prescribed for you. Read the directions carefully, and ask your doctor or other care provider to review them with you.

## 2018-10-29 NOTE — PROGRESS NOTES
SUBJECTIVE:   Linda Spicer is a 87 year old female who presents to clinic today for the following health issues:      Hospital Follow-up Visit:    Hospital/Nursing Home/IP Rehab Facility: [facility]  Date of Admission and Discharge: 10/08/18-10/12/18 and then 10/16/18  Reason(s) for Admission: Cardio Version             Problems taking medications regularly:  None       Medication changes since discharge: Started new medication        Problems adhering to non-medication therapy:  None    Summary of hospitalization:  [facility] discharge summary reviewed  Diagnostic Tests/Treatments reviewed.  Follow up needed: Yes.  Other Healthcare Providers Involved in Patient s Care:           Update since discharge: improved.    Post Discharge Medication Reconciliation: discharge medications reconciled, continue medications without change.  Plan of care communicated with patient and family     Coding guidelines for this visit:  Type of Medical   Decision Making Face-to-Face Visit       within 7 Days of discharge Face-to-Face Visit        within 14 days of discharge   Moderate Complexity 76102 [id number]   High Complexity [id number]              Heart Failure Follow-up    Symptoms:    Shortness of breath: none    Lower extremity edema: worsened from baseline    Chest pain: No    Using more pillows than normal: No    Cough at night: No    Weight:    Checking weight daily: Yes    Weight change: none    Cardiology visits, ER/UC, or hospital admissions since last visit: Hospitalizations -  Thee times since 9/4/2018.    Medication changes: Losartan and hydrochlorothiazide discontinued (apparently due to acute kidney injury) last 10/6/18, in favor of Amlodipine and Furosemide.       Problem list and histories reviewed & adjusted, as indicated.  Additional history: as documented    Patient Active Problem List   Diagnosis     Hypertension goal BP (blood pressure) < 150/90     Hypothyroidism     Macular  degeneration, left eye     Irritable bowel syndrome     Encounter for palliative care     Adjustment disorder with anxious mood     Mild anemia     DDD (degenerative disc disease), lumbar     CKD (chronic kidney disease) stage 3, GFR 30-59 ml/min (H)     History of blood transfusion     Aftercare following surgery     S/P lumbar laminectomy     High risk medication use     Age-related osteoporosis without current pathological fracture     Atrophic vaginitis     Fecal incontinence     Female stress incontinence     Impingement syndrome of both shoulders     UIP (usual interstitial pneumonitis) (H)     High risk medications (not anticoagulants) long-term use     Heart failure with preserved ejection fraction (H)     Congestive heart failure with preserved LV function, NYHA class 3 (H)     Other specified hypothyroidism     Rheumatoid arthritis involving multiple sites with positive rheumatoid factor (H)     Health Care Home     Sick sinus syndrome (H)     Cardiac pacemaker - Medtronic dual lead pacemaker - Not Dependent - MRI Safe     Immunosuppression (H)     ILD (interstitial lung disease) (H)     Heart failure with preserved ejection fraction, NYHA class I (H)     Atrial flutter (H)     Paroxysmal atrial fibrillation (H)     CHF exacerbation (H)     Past Surgical History:   Procedure Laterality Date     ANESTHESIA CARDIOVERSION N/A 9/14/2018    Procedure: ANESTHESIA CARDIOVERSION;;  Surgeon: GENERIC ANESTHESIA PROVIDER;  Location: [facility] OR     ANESTHESIA CARDIOVERSION N/A 10/11/2018    Procedure: ANESTHESIA CARDIOVERSION;  Cardioversion;  Surgeon: GENERIC ANESTHESIA PROVIDER;  Location: [facility] OR     ANESTHESIA CARDIOVERSION N/A 10/16/2018    Procedure: Anesthesia Cardioversion ;  Surgeon: GENERIC ANESTHESIA PROVIDER;  Location: [facility] OR     APPENDECTOMY       BIOPSY      hemorrhoidectomy     ENT SURGERY      tonsillectomy     GYN SURGERY      3 D & C's     HYSTERECTOMY, PAP NO LONGER INDICATED       LAMINECTOMY LUMBAR ONE  LEVEL N/A 10/13/2015    Procedure: LAMINECTOMY LUMBAR ONE LEVEL;  Surgeon: Fransico Toussaint MD;  Location:  OR       Social History   Substance Use Topics     Smoking status: Never Smoker     Smokeless tobacco: Never Used     Alcohol use Yes      Comment: rare wine      Family History   Problem Relation Age of Onset     Hypertension Mother      Psychotic Disorder Father      Diabetes Son      Diabetes Daughter      Blood Disease Daughter          Allergies   Allergen Reactions     Cephalexin Hcl Diarrhea     Gabapentin Other (See Comments)     Dizzsiness     Naproxen GI Disturbance     Perfume      Macrobid [Nitrofurantoin Anhydrous]      Possibly related to lung disease      Seasonal Allergies      Sulfa Drugs      Throat swelling     Xanax [Alprazolam] Other (See Comments)     Dizziness      Ciprofloxacin Itching and Rash     Recent Labs   Lab Test  10/29/18   0910  10/16/18   0734   08/15/18   0902  08/09/18   0958  05/02/18   1050  04/04/18   1510  02/07/18   1008   12/07/17   1157   08/30/17   1214   06/03/16   0756   LDL   --    --    --    --   87   --    --    --    --    --    --   76   --   109*   HDL   --    --    --    --   59   --    --    --    --    --    --   50   --   47*   TRIG   --    --    --    --   51   --    --    --    --    --    --   101   --   75   ALT   --    --    --   18   --   28   --   35   --    --    < >   --    < >  36   CR  1.58*  1.55*   < >  1.27*  1.38*  1.36*   --   1.43*   < >  1.26*   < >   --    < >  1.44*   GFRESTIMATED  31*  32*   < >  40*  36*  37*   --   35*   < >  40*   < >   --    < >  35*   GFRESTBLACK  37*  38*   < >  48*  44*  45*   --   42*   < >  49*   < >   --    < >  42*   POTASSIUM  4.2  4.5   < >   --   5.5*   --    --    --    < >   --    < >   --    < >  5.1   TSH   --    --    --    --    --    --   0.53   --    --   0.89   --   0.77   < >   --     < > = values in this interval not displayed.      BP Readings from Last 3 Encounters:   11/01/18  "125/77   10/29/18 132/75   10/24/18 116/73    Wt Readings from Last 3 Encounters:   11/01/18 161 lb 1 oz (73.1 kg)   10/29/18 160 lb (72.6 kg)   10/24/18 159 lb 14.4 oz (72.5 kg)                    ROS:  CONSTITUTIONAL: NEGATIVE for fever, chills, change in weight  INTEGUMENTARY/SKIN: NEGATIVE for worrisome rashes, moles or lesions  EYES: NEGATIVE for vision changes or irritation  ENT/MOUTH: NEGATIVE for ear, mouth and throat problems  RESP: NEGATIVE for significant cough or SOB  CV: NEGATIVE for chest pain, palpitations or peripheral edema  GI: NEGATIVE for nausea, abdominal pain, heartburn, or change in bowel habits  : NEGATIVE for frequency, dysuria, or hematuria  MUSCULOSKELETAL: NEGATIVE for significant arthralgias or myalgia  NEURO: NEGATIVE for weakness, dizziness or paresthesias  ENDOCRINE: NEGATIVE for temperature intolerance, skin/hair changes  HEME: NEGATIVE for bleeding problems  PSYCHIATRIC: NEGATIVE for changes in mood or affect    OBJECTIVE:     /75 (BP Location: Left arm, Patient Position: Sitting, Cuff Size: Adult Regular)  Pulse 63  Temp 98.6  F (37  C) (Oral)  Resp 16  Ht 5' 4\" (1.626 m)  Wt 160 lb (72.6 kg)  LMP  (LMP Unknown)  SpO2 97%  BMI 27.46 kg/m2  Body mass index is 27.46 kg/(m^2).  GENERAL: healthy, alert and no distress  EYES: Eyes grossly normal to inspection, PERRL and conjunctivae and sclerae normal  HENT: ear canals and TM's normal, nose and mouth without ulcers or lesions  NECK: no adenopathy, no asymmetry, masses, or scars and thyroid normal to palpation  RESP: lungs clear to auscultation - no rales, rhonchi or wheezes  CV: regular rate and rhythm, normal S1 S2, no S3 or S4, no murmur, click or rub, no peripheral edema and peripheral pulses strong  ABDOMEN: soft, nontender, no hepatosplenomegaly, no masses and bowel sounds normal  MS: Pitting edema, grade 1+ on both lower extremities.  SKIN: no suspicious lesions or rashes  NEURO: Normal strength and tone, mentation " intact and speech normal  PSYCH: mentation appears normal, affect normal/bright    Diagnostic Test Results:  Results for orders placed or performed in visit on 10/29/18   BNP-N terminal pro   Result Value Ref Range    N-Terminal Pro Bnp 3154 (H) 0 - 450 pg/mL     *Note: Due to a large number of results and/or encounters for the requested time period, some results have not been displayed. A complete set of results can be found in Results Review.       ASSESSMENT/PLAN:     (I50.32) Chronic diastolic congestive heart failure (H)  (primary encounter diagnosis)  Comment: Stable condition after multiple hospitalizations. Patient is finally compliant with low-salt diet. Ideal beta-blocker remains Carvedilol due to its vasodilating effect. Losartan discontinued due to recent acute kidney injry.  Plan: BNP-N terminal pro          Follow-up visit if condition worsens.    Tre Lockett MD  [facility]

## 2018-10-30 ENCOUNTER — PATIENT OUTREACH (OUTPATIENT)
Dept: CARE COORDINATION | Facility: CLINIC | Age: 83
End: 2018-10-30

## 2018-10-30 ENCOUNTER — TELEPHONE (OUTPATIENT)
Dept: UROLOGY | Facility: CLINIC | Age: 83
End: 2018-10-30

## 2018-10-30 DIAGNOSIS — R82.90 NONSPECIFIC FINDING ON EXAMINATION OF URINE: Primary | ICD-10-CM

## 2018-10-30 DIAGNOSIS — R30.0 DYSURIA: ICD-10-CM

## 2018-10-30 DIAGNOSIS — R30.0 DYSURIA: Primary | ICD-10-CM

## 2018-10-30 LAB
ALBUMIN UR-MCNC: NEGATIVE MG/DL
APPEARANCE UR: ABNORMAL
BACTERIA #/AREA URNS HPF: ABNORMAL /HPF
BILIRUB UR QL STRIP: NEGATIVE
COLOR UR AUTO: YELLOW
GLUCOSE UR STRIP-MCNC: NEGATIVE MG/DL
HGB UR QL STRIP: ABNORMAL
KETONES UR STRIP-MCNC: NEGATIVE MG/DL
LEUKOCYTE ESTERASE UR QL STRIP: ABNORMAL
NITRATE UR QL: NEGATIVE
NON-SQ EPI CELLS #/AREA URNS LPF: ABNORMAL /LPF
PH UR STRIP: 5.5 PH (ref 5–7)
RBC #/AREA URNS AUTO: ABNORMAL /HPF
SOURCE: ABNORMAL
SP GR UR STRIP: 1.01 (ref 1–1.03)
UROBILINOGEN UR STRIP-ACNC: 0.2 EU/DL (ref 0.2–1)
WBC #/AREA URNS AUTO: >100 /HPF
WBC CLUMPS #/AREA URNS HPF: PRESENT /HPF

## 2018-10-30 PROCEDURE — 81001 URINALYSIS AUTO W/SCOPE: CPT | Performed by: PHYSICIAN ASSISTANT

## 2018-10-30 PROCEDURE — 87186 SC STD MICRODIL/AGAR DIL: CPT | Performed by: PHYSICIAN ASSISTANT

## 2018-10-30 PROCEDURE — 87086 URINE CULTURE/COLONY COUNT: CPT | Performed by: PHYSICIAN ASSISTANT

## 2018-10-30 PROCEDURE — 87088 URINE BACTERIA CULTURE: CPT | Performed by: PHYSICIAN ASSISTANT

## 2018-10-30 RX ORDER — CEFDINIR 300 MG/1
300 CAPSULE ORAL DAILY
Qty: 7 CAPSULE | Refills: 0 | Status: SHIPPED | OUTPATIENT
Start: 2018-10-30 | End: 2019-02-07

## 2018-10-30 ASSESSMENT — ACTIVITIES OF DAILY LIVING (ADL): DEPENDENT_IADLS:: COOKING;CLEANING;LAUNDRY;SHOPPING;MEDICATION MANAGEMENT;MONEY MANAGEMENT;TRANSPORTATION

## 2018-10-30 NOTE — TELEPHONE ENCOUNTER
10/30/18 UA results abnormal, urine culture in process. Results reviewed with Dr. Martins as Asia Steven PA-C is out of the office today. Dr. Martins recommends for patient to start omnicef 300mg po daily for 7 days.     Called and spoke to patient who is aware of the above information. omnicef ordered and sent for Dr. Martins to review and sign order. Prescription [facility] [place] per patient request. Patient aware that we will watch for results and contact her once results are available. Patient verbalized understanding and was comfortable with plan.    Sena Chapman RN, BSN

## 2018-10-30 NOTE — TELEPHONE ENCOUNTER
Health Call Center    Phone Message    May a detailed message be left on voicemail: yes    Reason for Call: Symptoms or Concerns     If patient has red-flag symptoms, warm transfer to triage line    Current symptom or concern: Strong burning sensation when urinating.    Symptoms have been present for: today    Has patient previously been seen for this? Yes    By: Asia Steven        Are there any new or worsening symptoms? Burning started this morning.  Patient is requesting an order for UA/Culture. Please advise.  Thank you.       Action Taken: Message routed to:  Adult Clinics: Urology p [id number]

## 2018-10-30 NOTE — PROGRESS NOTES
Clinic Care Coordination Contact    Clinic Care Coordination Contact  OUTREACH    Referral Information:  Referral Source: Self-patient/Caregiver    Primary Diagnosis: CHF    Chief Complaint   Patient presents with     Clinic Care Coordination - Follow-up     RN        Universal Utilization: Patient is followed by rheumatology, cardiology, endocrinology, urology and pulmonology.   Clinic Utilization  Difficulty keeping appointments:: No  Compliance Concerns: No  No-Show Concerns: No  No PCP office visit in Past Year: No  Utilization    Last refreshed: 10/30/2018  4:07 PM:  No Show Count (past year) 1       Last refreshed: 10/30/2018  4:07 PM:  ED visits 0       Last refreshed: 10/30/2018  4:07 PM:  Hospital admissions 1          Current as of: 10/30/2018  4:07 PM             Clinical Concerns:  Current Medical Concerns:  Patient reports she has been asymptomatic of A-fib since her cardioversion on 10/16/18.    Patient informed writer of new UTI diagnosed today by urology.  Patient will follow their treatment plan and verbalized frustration of recurrent UTI's.  Patient provided an update to writer regarding her PCP appointment yesterday.  Patient states she would like to work on following a low sodium diet, as she has not been eating at the dining magallanes in her senior apartment building due to the food being high in sodium.  Patient states she has been eating Healthy Choice entrees, but was told that she should avoid frozen, processed food.  RN CC will send patient Low-Sodium Recipes and Recipe Changes for Heart Healthy Cooking information sheets from [employer].  Patient reports she will continue home care PT/OT for 3 more weeks, but nursing will be finished this Friday.    Patient Active Problem List   Diagnosis     Hypertension goal BP (blood pressure) < 150/90     Hypothyroidism     Macular degeneration, left eye     Irritable bowel syndrome     Encounter for palliative care     Adjustment disorder with anxious mood      Mild anemia     DDD (degenerative disc disease), lumbar     CKD (chronic kidney disease) stage 3, GFR 30-59 ml/min (H)     History of blood transfusion     Aftercare following surgery     S/P lumbar laminectomy     High risk medication use     Age-related osteoporosis without current pathological fracture     Atrophic vaginitis     Fecal incontinence     Female stress incontinence     Impingement syndrome of both shoulders     UIP (usual interstitial pneumonitis) (H)     High risk medications (not anticoagulants) long-term use     Heart failure with preserved ejection fraction (H)     Congestive heart failure with preserved LV function, NYHA class 3 (H)     Other specified hypothyroidism     Rheumatoid arthritis involving multiple sites with positive rheumatoid factor (H)     Health Care Home     Sick sinus syndrome (H)     Cardiac pacemaker - Medtronic dual lead pacemaker - Not Dependent - MRI Safe     Immunosuppression (H)     ILD (interstitial lung disease) (H)     Heart failure with preserved ejection fraction, NYHA class I (H)     Atrial flutter (H)     Paroxysmal atrial fibrillation (H)     CHF exacerbation (H)       Current Behavioral Concerns: n/a      Education Provided to patient: RN CC reviewed previous goal of completing health care directive and information regarding low sodium recipes.   Pain  Pain (GOAL):: No  Health Maintenance Reviewed: Due/Overdue   Health Maintenance Due   Topic Date Due     MICROALBUMIN Q1 YEAR  07/26/2018      Clinical Pathway: None    Medication Management:  Family is assisting patient with medication management, setting up pill boxes and reminders to take medications.     Functional Status:  Dependent ADLs:: Bathing, Ambulation-walker  Dependent IADLs:: Cooking, Cleaning, Laundry, Shopping, Medication Management, Money Management, Transportation  Bed or wheelchair confined:: No  Mobility Status: Independent w/Device    Living Situation:  Current living arrangement:: I live  alone  Type of residence:: Independent Senior Living    Diet/Exercise/Sleep:  Diet:: Low cholesterol, Low saturated fat, No added salt  Inadequate nutrition (GOAL):: No  Food Insecurity: No  Tube Feeding: No  Exercise:: Currently not exercising  Inadequate activity/exercise (GOAL):: No  Significant changes in sleep pattern (GOAL): No    Transportation:  Transportation concerns (GOAL):: No  Transportation means:: Family, [place] mobility     Psychosocial:  Baptist or spiritual beliefs that impact treatment:: No  Mental health DX:: No  Mental health management concern (GOAL):: No  Informal Support system:: [place] based, Family, Friends, Neighbors, Spouse     Financial/Insurance:   Financial/Insurance concerns (GOAL):: No       Resources and Interventions:  Current Resources:   List of home care services:: Skilled Nursing, Home Health Aid, Physicial Therapy, Occupational Therapy;   Community Resources: Home Care  Supplies used at home:: None  Equipment Currently Used at Home: raised toilet, shower chair, walker, rolling, wheelchair, power    Advance Care Plan/Directive  Advanced Care Plans/Directives on file:: In process  Advanced Care Plan/Directive Status: In Process    Patient states she forgot about her health care directive, but states she has it completed and just needs it notarized or witnessed.  Patient states she plans to have this done by next RN CC contact.    Referrals Placed: None     Goals:   Goals        General    #1 I will complete my health care directive. (pt-stated)     Notes - Note edited  8/21/2018 10:39 AM by Behl, Melissa K, RN    Goal Statement: I will complete my health care directive.  Measure of Success: Health care directive will be completed and scanned into chart.  Supportive Steps to Achieve: Patient has attended a health care directive class, 10/24/17 informed of CPR vs intubation  Barriers: is awaiting to see her  to finalize document  Strengths: has care coordination, home care  and excellent family support  Date to Achieve By: 12/31/18  Patient expressed understanding of goal: yes             #2 Healthy Eating (pt-stated)     Notes - Note created  10/30/2018  3:58 PM by Behl, Melissa K, RN    Goal Statement: I will follow a low sodium diet.  Measure of Success: Patient will consistently eat a low sodium diet.  Supportive Steps to Achieve: RN CC will mail out low sodium diet ideas.  Family providing assistance.  Barriers: not always able to cook for self  Strengths: family support  Date to Achieve By: 12/30/18  Patient expressed understanding of goal: yes                 Patient/Caregiver understanding: Patient has fair understanding of her current plan of care, but needs ongoing home care and care coordination due to her ongoing complex medical diagnoses.    Outreach Frequency: 2 weeks  Future Appointments              In 2 days Priscilla Agrawal PA-C [facility] AT [facility], [facility] PSA CLIN    In 3 weeks  LAB  [facility]    In 3 weeks  PFL C [facility] Pulmonary Function Testing, [facility]    In 3 weeks 1,  Cv Device [facility], [facility]    In 3 weeks Franki Carrasquillo MD [facility], [facility]    In 3 weeks [facility] INFUSION [facility]    In 1 month PFT LAB [facility]    In 1 month Dea Acosta MD [facility]    In 1 month [facility] [place] [facility]    In 2 months Mario Davenport MD [facility] [place]    In 3 months Emeterio Loza MD [facility]    In 4 months Asia Steven PA-C [facility]          Plan:   1. Patient will complete antibiotic treatment per urology.  2. Patient will follow a low sodium diet.  RN CC sending out low sodium recipe and cooking information.  3. Patient will have her health care  directive notarized and then bring a copy to clinic.  4. RN CC will follow up with patient in 2 weeks.    Melissa Behl BSN, RN, N  [facility] Care Coordinator  [phone]

## 2018-10-30 NOTE — TELEPHONE ENCOUNTER
Called and spoke to patient who reports burning with urination which started this morning. Per patient, the burning sensation is constant. Patient denies fevers and chills. Informed patient that she could complete a urine sample to check for infection and patient would like to do this. Future UA/UC ordered and sent for Asia Steven PA-C to review and sign. Lab only appointment scheduled for today at 10:15am. Patient allergies verified. Patient aware that she will be contacted with results.     Sena Chapman RN, BSN

## 2018-11-01 ENCOUNTER — OFFICE VISIT (OUTPATIENT)
Dept: CARDIOLOGY | Facility: CLINIC | Age: 83
End: 2018-11-01
Payer: MEDICARE

## 2018-11-01 VITALS
WEIGHT: 161.06 LBS | BODY MASS INDEX: 27.65 KG/M2 | OXYGEN SATURATION: 99 % | DIASTOLIC BLOOD PRESSURE: 77 MMHG | HEART RATE: 64 BPM | SYSTOLIC BLOOD PRESSURE: 125 MMHG

## 2018-11-01 DIAGNOSIS — I50.32 CHRONIC DIASTOLIC CONGESTIVE HEART FAILURE (H): Primary | ICD-10-CM

## 2018-11-01 DIAGNOSIS — I10 BENIGN ESSENTIAL HYPERTENSION: ICD-10-CM

## 2018-11-01 DIAGNOSIS — I50.30 HEART FAILURE WITH PRESERVED EJECTION FRACTION, NYHA CLASS I (H): ICD-10-CM

## 2018-11-01 DIAGNOSIS — I48.0 PAROXYSMAL ATRIAL FIBRILLATION (H): ICD-10-CM

## 2018-11-01 LAB
BACTERIA SPEC CULT: ABNORMAL
SPECIMEN SOURCE: ABNORMAL

## 2018-11-01 PROCEDURE — 99214 OFFICE O/P EST MOD 30 MIN: CPT | Performed by: PHYSICIAN ASSISTANT

## 2018-11-01 RX ORDER — FUROSEMIDE 40 MG
TABLET ORAL
Qty: 60 TABLET | Refills: 3 | Status: SHIPPED | OUTPATIENT
Start: 2018-11-01 | End: 2018-11-09

## 2018-11-01 RX ORDER — CARVEDILOL 6.25 MG/1
6.25 TABLET ORAL 2 TIMES DAILY WITH MEALS
Qty: 60 TABLET | Refills: 1 | Status: SHIPPED | OUTPATIENT
Start: 2018-11-01 | End: 2018-11-27

## 2018-11-01 NOTE — LETTER
"11/1/2018      RE: Linda Spicer  [address]  [facility] [id number]       Dear Colleague,    Thank you for the opportunity to participate in the care of your patient, Linda Spicer, at the [facility] AT [facility]. Please see a copy of my visit note below.    CARDIOLOGY CLINIC  Linda Spicer is a 87 year old female with sick sinus syndrome s/p pacemaker, atrial fibrillation on anticoagulation and diastolic heart failure here for follow up.     I met her around 2 weeks ago as a new referral. She has followed with our electrophysiologist team for her atrial fibrillation and was having recurrence for which she had been cardioverted a few times. There had been a recent change in her diuretic regimen which resulted in a hospitalization earlier this month. She was doing well when I saw her.     Today she notes she is feeling more bloated and legs are more swollen at the ankles. Her weight is up about 2 pounds in the past 2 weeks, almost 1 pound just over night despite no changes in diet yesterday. Her diet is wel controlled for fluid and salt intake. She has noted a bit more dyspnea as well, using her walker when walking longer distances like coming into clinic today. She denies cough though dose have chronic throat \"clearing.\" She is feeling quite fatigued overall. She doesn't believe her a fib has recurred. She did bring a chart of her recent weights, pulses and blood pressures for me to review. Her son is present with her again today    PAST MEDICAL HISTORY:  Past Medical History:   Diagnosis Date     Adjustment disorder with anxious mood 5/18/2015     Advanced directives, counseling/discussion 8/30/2012    Patient states has Advance Directive and will bring in a copy to clinic. 8/30/2012   Tevin May  Clinic Medical Assistant \       Anemia 9/25/2015     Basal cell cancer      CHF (congestive heart " failure) (H) 9/18/2014     CKD (chronic kidney disease) stage 3, GFR 30-59 ml/min (H) 9/29/2015     DDD (degenerative disc disease), lumbar 9/25/2015     Diffuse idiopathic pulmonary fibrosis (H) 5/6/2013     Encounter for palliative care 5/18/2015     History of blood transfusion 9/29/2015     Hypertension goal BP (blood pressure) < 140/90 9/7/2012     Hypothyroid 9/7/2012     Irritable bowel syndrome 10/29/2013     Macular degeneration      Macular degeneration, 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     Nondisplaced spiral fracture of shaft of humerus      Osteoporosis 8/13/2013     Imo Update utility     RA (rheumatoid arthritis) (H) 5/7/2013     Rheumatic fever      Shingles      Spinal stenosis of lumbar region with neurogenic claudication 9/14/2015     SOCIAL HISTORY: newly , here with her son, lives in senior living    CURRENT MEDICATIONS:  Current Outpatient Prescriptions   Medication Sig Dispense Refill     Abatacept (ORENCIA IV) Inject into the vein every 28 days       ACETAMINOPHEN PO Take 1,000 mg by mouth 2 times daily as needed        albuterol (2.5 MG/3ML) 0.083% neb solution Take 1 vial by nebulization every 6 hours as needed for shortness of breath / dyspnea or wheezing       amiodarone (PACERONE/CODARONE) 200 MG tablet 1st week: 400mg twice a day. 2nd week: 200mg twice a day. Then 200mg daily 120 tablet 3     amLODIPine (NORVASC) 5 MG tablet Take 1 tablet (5 mg) by mouth daily 90 tablet 3     apixaban ANTICOAGULANT (ELIQUIS) 2.5 MG tablet Take 1 tablet (2.5 mg) by mouth 2 times daily 180 tablet 3     Artificial Tear Ointment (REFRESH P.M.) OINT Apply  to eye. Daily at bedtime          Ascorbic Acid (VITAMIN C PO) Take 500 mg by mouth daily        azaTHIOprine (IMURAN) 50 MG tablet Take 1 tablet (50 mg) by mouth daily 90 tablet 2     Blood Pressure Monitor KIT 1 kit daily as needed 1 kit 0     calcium-vitamin D (CALTRATE) 600-400 MG-UNIT per tablet Take 1 tablet by mouth 2 times daily        cefdinir (OMNICEF)  300 MG capsule Take 1 capsule (300 mg) by mouth daily for 7 days 7 capsule 0     cetirizine (ZYRTEC ALLERGY) 10 MG tablet Take 10 mg by mouth At Bedtime       COMPOUNDED NON-CONTROLLED SUBSTANCE (CMPD RX) - PHARMACY TO MIX COMPOUNDED MEDICATION Estriol 1mg/gram. Place 1 gram vaginally daily for two weeks, then vaginally twice weekly after. 30 g 6     fish oil-omega-3 fatty acids (FISH OIL) 1000 MG capsule Take 2 g by mouth daily. 2 capsules daily            folic acid (FOLVITE) 1 MG tablet Take 1 tablet (1 mg) by mouth daily 90 tablet 3     furosemide (LASIX) 40 MG tablet Take 0.5 tablets (20 mg) by mouth 2 times daily 60 tablet 3     hydrocortisone 2.5 % cream Apply BID to affected region(s) for 7-10 days. 30 g 0     Hypromellose (GENTEAL MILD OP) Apply  to eye daily.       levothyroxine (SYNTHROID/LEVOTHROID) 75 MCG tablet TAKE ONE TABLET BY MOUTH ONCE DAILY 90 tablet 1     metoprolol succinate (TOPROL-XL) 25 MG 24 hr tablet Take 1 tablet (25 mg) by mouth daily 90 tablet 3     Multiple Vitamins-Minerals (PRESERVISION AREDS PO) Take 1 tablet by mouth daily        nitroglycerin (NITROSTAT) 0.4 MG SL tablet Place 1 tablet (0.4 mg) under the tongue every 5 minutes as needed for chest pain 25 tablet 0     order for DME Dispense SP Magdy 1 each 0     order for DME Equipment being ordered: Dispense baffle, for use with nebulizer. 1 each 0     order for DME Equipment being ordered: Nebulizer. Use with Albuterol. 1 each 0     order for DME Equipment being ordered: Dispense face mask.  Mrs. Spicer is immunosuppressed due to rheumatoid arthritis. 1 [address] 11     ranibizumab (LUCENTIS) 0.3 MG/0.05ML SOLN 0.3 mg by Intravitreal route Every 6 weeks       ranitidine (ZANTAC) 150 MG tablet Take 1 tablet (150 mg) by mouth 2 times daily 60 tablet 5     saccharomyces boulardii (FLORASTOR) 250 MG capsule Take 250 mg by mouth daily       senna-docusate (SENOKOT-S;PERICOLACE) 8.6-50 MG per tablet Take 2 tablets by mouth At Bedtime  Hold if diarrhea occurs. 120 tablet 11     ROS:  No fever or chills. +Fatigue  No rhinorrhea, epistaxis, vision changes  Resp as above  Card as above  No nausea, vomiting, diarrhea. No melena or hematochezia  No new rashes  No headaches, presyncope, focal weakness    EXAM:  /77 (BP Location: Left arm, Patient Position: Chair, Cuff Size: Adult Large)  Pulse 64  Wt 73.1 kg (161 lb 1 oz)  LMP  (LMP Unknown)  SpO2 99%  BMI 27.65 kg/m2  Home weight: 157lbs  General: seated in chair, in NAD  HEENT: NC/AT, sclera anicteric, MMM  Card: RRR, no murmur appreciated. JVP 10cm  Pulm: mild crackles at bases, apices CTA  Abdomen: distended, +BS, soft  Extremities: 1+ edema   Neurological: alert, conversant with normal speech, moving all extremities equally    Labs:  CMP RESULTS:  Lab Results   Component Value Date     10/29/2018    POTASSIUM 4.2 10/29/2018    CHLORIDE 101 10/29/2018    CO2 31 10/29/2018    ANIONGAP 8 10/29/2018     (H) 10/29/2018    BUN 36 (H) 10/29/2018    CR 1.58 (H) 10/29/2018    GFRESTIMATED 31 (L) 10/29/2018    GFRESTBLACK 37 (L) 10/29/2018    FATOUMATA 9.0 10/29/2018    BILITOTAL 0.5 08/15/2018    ALBUMIN 3.4 08/15/2018    ALKPHOS 111 08/15/2018    ALT 18 08/15/2018    AST 18 08/15/2018      Assessment and Plan:    Linda Spicer is a 87 year old female with diastolic heart failure who presents for follow up. I met her a few weeks ago at which time she was mildly volume overloaded but had recent atrial fibrillation exacerbation with return to normal sinus rhythm. She seems to have maintained sinus rhythm, at least not rapid atrial fibrillation since our last visit. However, her weight is slowing trending up and she is wondering why. We discussed that it may just be that we haven't found the adequate dose of diuretic. In addition, last visit her blood pressure was borderline and her log she brings ranges from 120s to 160s systolic which has a little room for improvement.     1. Chronic  diastolic heart failure, Stage C, NYHA class IIIb   Fluid status hypervolemic, increase lasix to 40mg in the AM and 20mg in the PM. Estimate goal weight around 154lbs  Aldosterone antagonist: has had borderline potassium levels. Consider if K improves on follow up labs.   BP: still some systolic 150s/160s- will adust, see below   Ischemia evaluation: negative  stress in , can consider repeat if diastolic heart failure difficult to manage/maintain   Atrial fibrillation: has follow up in a few weeks with device check, appears to be maintaining sinus  ACEi/ARB - N/A no evidence to support use HFpEF  BB - N/A no evidence to support use in HFpEF  NSAID use: Contraindicated, reviewed with patient     2. Hypertension  -could use improved control. Discussed that some of her swelling could be related to amlodipine.   -stop amlodipine and metoprolol XL  -start carvedilol 6.25mg BID to offer rate control as well as better anti-hypertensive    3.  Follow-up: BMP next week, EP in 3 weeks as arranged, CORE in 6 weeks.     I spent 25 minutes with this patient, >50% on counseling and education    Priscilla Agrawal PA-C  [facility]  Pager [phone]

## 2018-11-01 NOTE — PROGRESS NOTES
"CARDIOLOGY CLINIC  Linda Spicer is a 87 year old female with sick sinus syndrome s/p pacemaker, atrial fibrillation on anticoagulation and diastolic heart failure here for follow up.     I met her around 2 weeks ago as a new referral. She has followed with our electrophysiologist team for her atrial fibrillation and was having recurrence for which she had been cardioverted a few times. There had been a recent change in her diuretic regimen which resulted in a hospitalization earlier this month. She was doing well when I saw her.     Today she notes she is feeling more bloated and legs are more swollen at the ankles. Her weight is up about 2 pounds in the past 2 weeks, almost 1 pound just over night despite no changes in diet yesterday. Her diet is wel controlled for fluid and salt intake. She has noted a bit more dyspnea as well, using her walker when walking longer distances like coming into clinic today. She denies cough though dose have chronic throat \"clearing.\" She is feeling quite fatigued overall. She doesn't believe her a fib has recurred. She did bring a chart of her recent weights, pulses and blood pressures for me to review. Her son is present with her again today    PAST MEDICAL HISTORY:  Past Medical History:   Diagnosis Date     Adjustment disorder with anxious mood 5/18/2015     Advanced directives, counseling/discussion 8/30/2012    Patient states has Advance Directive and will bring in a copy to clinic. 8/30/2012   Tevin May  [facility] Medical Assistant \       Anemia 9/25/2015     Basal cell cancer      CHF (congestive heart failure) (H) 9/18/2014     CKD (chronic kidney disease) stage 3, GFR 30-59 ml/min (H) 9/29/2015     DDD (degenerative disc disease), lumbar 9/25/2015     Diffuse idiopathic pulmonary fibrosis (H) 5/6/2013     Encounter for palliative care 5/18/2015     History of blood transfusion 9/29/2015     Hypertension goal BP (blood pressure) < 140/90 9/7/2012     Hypothyroid " 9/7/2012     Irritable bowel syndrome 10/29/2013     Macular degeneration      Macular degeneration, 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     Nondisplaced spiral fracture of shaft of humerus      Osteoporosis 8/13/2013     Imo Update utility     RA (rheumatoid arthritis) (H) 5/7/2013     Rheumatic fever      Shingles      Spinal stenosis of lumbar region with neurogenic claudication 9/14/2015     SOCIAL HISTORY: newly , here with her son, lives in senior living    CURRENT MEDICATIONS:  Current Outpatient Prescriptions   Medication Sig Dispense Refill     Abatacept (ORENCIA IV) Inject into the vein every 28 days       ACETAMINOPHEN PO Take 1,000 mg by mouth 2 times daily as needed        albuterol (2.5 MG/3ML) 0.083% neb solution Take 1 vial by nebulization every 6 hours as needed for shortness of breath / dyspnea or wheezing       amiodarone (PACERONE/CODARONE) 200 MG tablet 1st week: 400mg twice a day. 2nd week: 200mg twice a day. Then 200mg daily 120 tablet 3     amLODIPine (NORVASC) 5 MG tablet Take 1 tablet (5 mg) by mouth daily 90 tablet 3     apixaban ANTICOAGULANT (ELIQUIS) 2.5 MG tablet Take 1 tablet (2.5 mg) by mouth 2 times daily 180 tablet 3     Artificial Tear Ointment (REFRESH P.M.) OINT Apply  to eye. Daily at bedtime          Ascorbic Acid (VITAMIN C PO) Take 500 mg by mouth daily        azaTHIOprine (IMURAN) 50 MG tablet Take 1 tablet (50 mg) by mouth daily 90 tablet 2     Blood Pressure Monitor KIT 1 kit daily as needed 1 kit 0     calcium-vitamin D (CALTRATE) 600-400 MG-UNIT per tablet Take 1 tablet by mouth 2 times daily        cefdinir (OMNICEF) 300 MG capsule Take 1 capsule (300 mg) by mouth daily for 7 days 7 capsule 0     cetirizine (ZYRTEC ALLERGY) 10 MG tablet Take 10 mg by mouth At Bedtime       COMPOUNDED NON-CONTROLLED SUBSTANCE (CMPD RX) - PHARMACY TO MIX COMPOUNDED MEDICATION Estriol 1mg/gram. Place 1 gram vaginally daily for two weeks, then vaginally twice weekly after. 30 g 6     fish oil-omega-3  fatty acids (FISH OIL) 1000 MG capsule Take 2 g by mouth daily. 2 capsules daily            folic acid (FOLVITE) 1 MG tablet Take 1 tablet (1 mg) by mouth daily 90 tablet 3     furosemide (LASIX) 40 MG tablet Take 0.5 tablets (20 mg) by mouth 2 times daily 60 tablet 3     hydrocortisone 2.5 % cream Apply BID to affected region(s) for 7-10 days. 30 g 0     Hypromellose (GENTEAL MILD OP) Apply  to eye daily.       levothyroxine (SYNTHROID/LEVOTHROID) 75 MCG tablet TAKE ONE TABLET BY MOUTH ONCE DAILY 90 tablet 1     metoprolol succinate (TOPROL-XL) 25 MG 24 hr tablet Take 1 tablet (25 mg) by mouth daily 90 tablet 3     Multiple Vitamins-Minerals (PRESERVISION AREDS PO) Take 1 tablet by mouth daily        nitroglycerin (NITROSTAT) 0.4 MG SL tablet Place 1 tablet (0.4 mg) under the tongue every 5 minutes as needed for chest pain 25 tablet 0     order for DME Dispense SP Walker-small 1 each 0     order for DME Equipment being ordered: Dispense baffle, for use with nebulizer. 1 each 0     order for DME Equipment being ordered: Nebulizer. Use with Albuterol. 1 each 0     order for DME Equipment being ordered: Dispense face mask.  Mrs. Spicer is immunosuppressed due to rheumatoid arthritis. 1 Box 11     ranibizumab (LUCENTIS) 0.3 MG/0.05ML SOLN 0.3 mg by Intravitreal route Every 6 weeks       ranitidine (ZANTAC) 150 MG tablet Take 1 tablet (150 mg) by mouth 2 times daily 60 tablet 5     saccharomyces boulardii (FLORASTOR) 250 MG capsule Take 250 mg by mouth daily       senna-docusate (SENOKOT-S;PERICOLACE) 8.6-50 MG per tablet Take 2 tablets by mouth At Bedtime Hold if diarrhea occurs. 120 tablet 11     ROS:  No fever or chills. +Fatigue  No rhinorrhea, epistaxis, vision changes  Resp as above  Card as above  No nausea, vomiting, diarrhea. No melena or hematochezia  No new rashes  No headaches, presyncope, focal weakness    EXAM:  /77 (BP Location: Left arm, Patient Position: Chair, Cuff Size: Adult Large)  Pulse 64   Wt 73.1 kg (161 lb 1 oz)  LMP  (LMP Unknown)  SpO2 99%  BMI 27.65 kg/m2  Home weight: 157lbs  General: seated in chair, in NAD  HEENT: NC/AT, sclera anicteric, MMM  Card: RRR, no murmur appreciated. JVP 10cm  Pulm: mild crackles at bases, apices CTA  Abdomen: distended, +BS, soft  Extremities: 1+ edema   Neurological: alert, conversant with normal speech, moving all extremities equally    Labs:  CMP RESULTS:  Lab Results   Component Value Date     10/29/2018    POTASSIUM 4.2 10/29/2018    CHLORIDE 101 10/29/2018    CO2 31 10/29/2018    ANIONGAP 8 10/29/2018     (H) 10/29/2018    BUN 36 (H) 10/29/2018    CR 1.58 (H) 10/29/2018    GFRESTIMATED 31 (L) 10/29/2018    GFRESTBLACK 37 (L) 10/29/2018    FATOUMATA 9.0 10/29/2018    BILITOTAL 0.5 08/15/2018    ALBUMIN 3.4 08/15/2018    ALKPHOS 111 08/15/2018    ALT 18 08/15/2018    AST 18 08/15/2018      Assessment and Plan:    Linda Spicer is a 87 year old female with diastolic heart failure who presents for follow up. I met her a few weeks ago at which time she was mildly volume overloaded but had recent atrial fibrillation exacerbation with return to normal sinus rhythm. She seems to have maintained sinus rhythm, at least not rapid atrial fibrillation since our last visit. However, her weight is slowing trending up and she is wondering why. We discussed that it may just be that we haven't found the adequate dose of diuretic. In addition, last visit her blood pressure was borderline and her log she brings ranges from 120s to 160s systolic which has a little room for improvement.     1. Chronic diastolic heart failure, Stage C, NYHA class IIIb   Fluid status hypervolemic, increase lasix to 40mg in the AM and 20mg in the PM. Estimate goal weight around 154lbs  Aldosterone antagonist: has had borderline potassium levels. Consider if K improves on follow up labs.   BP: still some systolic 150s/160s- will adust, see below   Ischemia evaluation: negative  stress in  2014, can consider repeat if diastolic heart failure difficult to manage/maintain   Atrial fibrillation: has follow up in a few weeks with device check, appears to be maintaining sinus  ACEi/ARB - N/A no evidence to support use HFpEF  BB - N/A no evidence to support use in HFpEF  NSAID use: Contraindicated, reviewed with patient     2. Hypertension  -could use improved control. Discussed that some of her swelling could be related to amlodipine.   -stop amlodipine and metoprolol XL  -start carvedilol 6.25mg BID to offer rate control as well as better anti-hypertensive    3.  Follow-up: BMP next week, EP in 3 weeks as arranged, CORE in 6 weeks.     I spent 25 minutes with this patient, >50% on counseling and education    Priscilla Agrawal PA-C  [facility]  Pager [phone]

## 2018-11-01 NOTE — NURSING NOTE
Diet: Patient instructed regarding a heart healthy diet, including discussion of reduced fat and sodium intake. Patient demonstrated understanding of this information and agreed to call with further questions or concerns.  Labs: Patient was given results of the laboratory testing obtained today. Patient was instructed to return for the next laboratory testing in 1 week. Patient demonstrated understanding of this information and agreed to call with further questions or concerns.   New Medication: Patient was educated regarding newly prescribed medication, including discussion of  the indication, administration, side effects, and when to report to MD or RN. Patient demonstrated understanding of this information and agreed to call with further questions or concerns.  Return Appointment: Patient given instructions regarding scheduling next clinic visit. Patient demonstrated understanding of this information and agreed to call with further questions or concerns.  Medication Change: Patient was educated regarding prescribed medication change, including discussion of the indication, administration, side effects, and when to report to MD or RN. Patient demonstrated understanding of this information and agreed to call with further questions or concerns.  Patient stated she understood all health information given and agreed to call with further questions or concerns.

## 2018-11-01 NOTE — TELEPHONE ENCOUNTER
10/30/18 UC results available and show that the omnicef is an appropriate antibiotic. Attempted to reach patient by phone, but no answer. Left generic message with request for patient to return call to clinic. When patient returns call, will inform her of the 10/30/18 UC results, result note and recommendations from Asia Steven PA-C, and assist in scheduling a sooner follow up visit with Asia Steven PA-C.    Sena Chapman RN, BSN

## 2018-11-01 NOTE — TELEPHONE ENCOUNTER
Received returned call and is aware of the 10/30/18 urine culture results, result note and recommendations from Asia Steven PA-C. Patient verbalized understanding and was comfortable with plan. Follow up visit with Asia Steven PA-C scheduled for 11/9/18 at 10:30am. Informed patient to call with any questions in the meantime.    Sena Chapman RN, BSN

## 2018-11-01 NOTE — NURSING NOTE
"Chief Complaint   Patient presents with     CORE     Return CORE; 87 year old female with chronic diastolic heart failure presents for return visit with labs prior. Patient notes an increase in weight, SOB and fatigue.  She also reports an increase in swelling of her ankles and abdomen.       Initial /77 (BP Location: Left arm, Patient Position: Chair, Cuff Size: Adult Large)  Pulse 64  Wt 73.1 kg (161 lb 1 oz)  LMP  (LMP Unknown)  SpO2 99%  BMI 27.65 kg/m2 Estimated body mass index is 27.65 kg/(m^2) as calculated from the following:    Height as of 10/29/18: 1.626 m (5' 4\").    Weight as of this encounter: 73.1 kg (161 lb 1 oz)..  BP completed using cuff size: harika Mercado, SETH        "

## 2018-11-01 NOTE — PATIENT INSTRUCTIONS
"Thank you for seeing Rochelle Agrawal PA-C at the [facility] @ [place];  please note the following instructions:    1. STOP taking Amlodipine and Metoprolol    2. START taking Carvedilol (Coreg) 6.25 mg twice daily.     3. Increase Lasix to 40 mg in the morning, 20 mg in the afternoon.     4. Repeat labs in one week to check Potassium; please see following pages for appointment detail information.     5. Follow up in CORE Clinic mid December; please see following pages for appointment detail information.      _Results for RG WALTER (MRN [id number]) as of 2018 09:50   Ref. Range 10/29/2018 09:10   Sodium Latest Ref Range: 133 - 144 mmol/L 140   Potassium Latest Ref Range: 3.4 - 5.3 mmol/L 4.2   Chloride Latest Ref Range: 94 - 109 mmol/L 101   Carbon Dioxide Latest Ref Range: 20 - 32 mmol/L 31   Urea Nitrogen Latest Ref Range: 7 - 30 mg/dL 36 (H)   Creatinine Latest Ref Range: 0.52 - 1.04 mg/dL 1.58 (H)   GFR Estimate Latest Ref Range: >60 mL/min/1.7m2 31 (L)   GFR Estimate If Black Latest Ref Range: >60 mL/min/1.7m2 37 (L)   Calcium Latest Ref Range: 8.5 - 10.1 mg/dL 9.0   Anion Gap Latest Ref Range: 3 - 14 mmol/L 8   N-Terminal Pro Bnp Latest Ref Range: 0 - 450 pg/mL 3154 (H)   Glucose Latest Ref Range: 70 - 99 mg/dL 141 (H)   ______________________________________________________________________    Please limit your fluid intake to 2 L (64 ounces) daily.    2 Liters a day = 8.5 cups, or 72 ounces.  Please limit your salt intake to 2 grams a day or less.    If you gain 2# in 24 hours or 5# in one week please call us so we can adjust your medications as needed over the phone.      Please feel free to call me with any questions or concerns.    Lizett Zapata RN CHFN  *Questions and [phone].   First press #1 for the University and then press #3 for \"Medical Questions\" to reach us Cardiology Nurses.     *On Call Cardiologist for after hours or on weekends: " [phone]   option #4 and ask to speak to the on-call Cardiologist. Inform them you are a CORE/heart failure patient at the [facility].    *If you need a medication refill, please contact your pharmacy.  Please allow 3 business days for your refill to be completed.  _______________________________________________________  [facility] Cardiomyopathy, Optimization, Rehabilitation, Education   The [facility] is a heart failure specialty clinic within the [facility] where you will work with specialized nurse practitioners dedicated to helping patients with heart failure carefully adjust medications, receive education, and learn who and when to call if symptoms develop. They specialize in helping you better understand your condition, slow the progression of your disease, improve the length and quality of your life, help you detect future heart problems before they become life threatening, and avoid hospitalizations.  As always, thank you for trusting us with your health care needs!

## 2018-11-01 NOTE — MR AVS SNAPSHOT
After Visit Summary   11/1/2018    Linda Walter    MRN: [id number]           Patient Information     Date Of Birth          6/13/1931        Visit Information        Provider Department      11/1/2018 9:30 AM Priscilla Agrawal PA-C [facility] AT [facility]        Today's Diagnoses     Benign essential hypertension    -  1    Chronic diastolic congestive heart failure (H)        Heart failure with preserved ejection fraction, NYHA class I (H)          Care Instructions    Thank you for seeing Rochelle Agrawal PA-C at the [facility];  please note the following instructions:    1. STOP taking Amlodipine and Metoprolol    2. START taking Carvedilol (Coreg) 6.25 mg twice daily.     3. Increase Lasix to 40 mg in the morning, 20 mg in the afternoon.     4. Repeat labs in one week to check Potassium; please see following pages for appointment detail information.     5. Follow up in [facility] mid December; please see following pages for appointment detail information.      _Results for LINDA WALTER (MRN [id number]) as of 11/1/2018 09:50   Ref. Range 10/29/2018 09:10   Sodium Latest Ref Range: 133 - 144 mmol/L 140   Potassium Latest Ref Range: 3.4 - 5.3 mmol/L 4.2   Chloride Latest Ref Range: 94 - 109 mmol/L 101   Carbon Dioxide Latest Ref Range: 20 - 32 mmol/L 31   Urea Nitrogen Latest Ref Range: 7 - 30 mg/dL 36 (H)   Creatinine Latest Ref Range: 0.52 - 1.04 mg/dL 1.58 (H)   GFR Estimate Latest Ref Range: >60 mL/min/1.7m2 31 (L)   GFR Estimate If Black Latest Ref Range: >60 mL/min/1.7m2 37 (L)   Calcium Latest Ref Range: 8.5 - 10.1 mg/dL 9.0   Anion Gap Latest Ref Range: 3 - 14 mmol/L 8   N-Terminal Pro Bnp Latest Ref Range: 0 - 450 pg/mL 3154 (H)   Glucose Latest Ref Range: 70 - 99 mg/dL 141 (H)   ______________________________________________________________________    Please limit your fluid intake to 2 L (64 ounces)  "daily.    2 Liters a day = 8.5 cups, or 72 ounces.  Please limit your salt intake to 2 grams a day or less.    If you gain 2# in 24 hours or 5# in one week please call us so we can adjust your medications as needed over the phone.      Please feel free to call me with any questions or concerns.    Lizett Zapata RN CHFN  *Questions and [phone].   First press #1 for the University and then press #3 for \"Medical Questions\" to reach us Cardiology Nurses.     *On Call Cardiologist for after hours or on weekends: [phone]   option #4 and ask to speak to the on-call Cardiologist. Inform them you are a CORE/heart failure patient at the [facility].    *If you need a medication refill, please contact your pharmacy.  Please allow 3 business days for your refill to be completed.  _______________________________________________________  [facility] Cardiomyopathy, Optimization, Rehabilitation, Education   The [facility] is a heart failure specialty clinic within the [facility] where you will work with specialized nurse practitioners dedicated to helping patients with heart failure carefully adjust medications, receive education, and learn who and when to call if symptoms develop. They specialize in helping you better understand your condition, slow the progression of your disease, improve the length and quality of your life, help you detect future heart problems before they become life threatening, and avoid hospitalizations.  As always, thank you for trusting us with your health care needs!              Follow-ups after your visit        Additional Services     Follow-Up with [facility]           Follow-Up with [facility]                 Your next 10 appointments already scheduled     2018 10:00 AM CST   LAB with [facility] LAB   [facility]    [address] [place] [address] "   [phone]           Please do not eat 10-12 hours before your appointment if you are coming in fasting for labs on lipids, cholesterol, or glucose (sugar). This does not apply to pregnant women. Water, hot tea and black coffee (with nothing added) are okay. Do not drink other fluids, diet soda or chew gum.            Nov 21, 2018  8:30 AM CST   Lab with UC LAB    [facility] ([facility])    [address]  1st Floor  [facility] [address]   [phone]            Nov 21, 2018  9:00 AM CST   FULL PULMONARY FUNCTION with  PFL C   [facility] Function [facility] ([facility])    [address]  3rd Floor  [facility] [address]   [phone]            Nov 21, 2018 10:30 AM CST   (Arrive by 10:15 AM)   Pacemaker Check with  Cv Device 1   [facility] ([facility])    [address] [place] [address]  3rd Floor  [id number]               Nov 21, 2018 11:30 AM CST   (Arrive by 11:15 AM)   RETURN ARRHYTHMIA with Franki Carrasquillo MD   [facility] ([facility])    [address] [place] [address]  [address]  [facility] [id number] [phone]            Nov 26, 2018  1:00 PM CST   Level 1 with BAY 8 [facility] ([facility])    [address] [place] [id number] [phone]            Dec 11, 2018 10:00 AM CST   Office Visit with PFT LAB   [facility])    [address] [place] [id number] [phone]           Bring a current list of meds and any records pertaining to this visit. For Physicals, please bring immunization records and any forms needing to be filled out. Please arrive 10 minutes early to complete paperwork.            Dec 11, 2018 11:00 AM CST   Return Visit with Dea Acosta MD   [facility] ([facility])    [address] [place] [address]  N  [place] [address]   [phone]            Dec 14, 2018  9:45 AM CST   LAB with BK LAB   [facility] ([facility])    [address] [address]   [phone]           Please do not eat 10-12 hours before your appointment if you are coming in fasting for labs on lipids, cholesterol, or glucose (sugar). This does not apply to pregnant women. Water, hot tea and black coffee (with nothing added) are okay. Do not drink other fluids, diet soda or chew gum.            Dec 17, 2018  9:30 AM CST   Core Return with Karla Mabry NP   [facility]YS[facility] AT [facility] ([facility])    [address] [place] [address] Floor  [facility] [address]   [phone]              Future tests that were ordered for you today     Open Future Orders        Priority Expected Expires Ordered    Follow-Up with [facility] Routine 12/7/2018 2/6/2019 11/1/2018    Follow-Up with [facility] Routine 12/17/2018 2/6/2019 11/1/2018    Basic metabolic panel Routine 11/8/2018 2/6/2019 11/1/2018            Who to contact     If you have questions or need follow up information about today's clinic visit or your schedule please contact [facility] AT [facility] directly at [phone].  Normal or non-critical lab and imaging results will be communicated to you by MyChart, letter or phone within 4 business days after the clinic has received the results. If you do not hear from us within 7 days, please contact the clinic through [employer] or phone. If you have a critical or abnormal lab result, we will notify you by phone as soon as possible.  Submit refill requests through [facility] or call your pharmacy and they will forward the refill request to us. Please allow 3 business days for your refill to be completed.          Additional Information About Your Visit        MyChart Information     [facility] gives you secure  access to your electronic health record. If you see a primary care provider, you can also send messages to your care team and make appointments. If you have questions, please call your primary care clinic.  If you do not have a primary care provider, please call [phone] and they will assist you.        Care EveryWhere ID     This is your Care EveryWhere ID. This could be used by other organizations to access your [facility] medical records  [id number]        Your Vitals Were     Pulse Last Period Pulse Oximetry BMI (Body Mass Index)          64 (LMP Unknown) 99% 27.65 kg/m2         Blood Pressure from Last 3 Encounters:   11/01/18 125/77   10/29/18 132/75   10/24/18 116/73    Weight from Last 3 Encounters:   11/01/18 73.1 kg (161 lb 1 oz)   10/29/18 72.6 kg (160 lb)   10/24/18 72.5 kg (159 lb 14.4 oz)              We Performed the Following     Follow-Up with [facility]          Today's Medication Changes          These changes are accurate as of 11/1/18 10:32 AM.  If you have any questions, ask your nurse or doctor.               Start taking these medicines.        Dose/Directions    carvedilol 6.25 MG tablet   Commonly known as:  COREG   Used for:  Heart failure with preserved ejection fraction, NYHA class I (H), Benign essential hypertension   Started by:  Priscilla Agrawal PA-C        Dose:  6.25 mg   Take 1 tablet (6.25 mg) by mouth 2 times daily (with meals)   Quantity:  60 tablet   Refills:  1         These medicines have changed or have updated prescriptions.        Dose/Directions    furosemide 40 MG tablet   Commonly known as:  LASIX   This may have changed:    - how much to take  - how to take this  - when to take this  - additional instructions   Used for:  Heart failure with preserved ejection fraction, NYHA class I (H)   Changed by:  Priscilla Agrawal PA-C        Take 1 tablet (40mg) in the morning and take 1/2 tablet (20mg) in the evening.   Quantity:  60 tablet   Refills:  3             Where to get your medicines      These medications were sent to [facility] - [facility], [place] - [address]  [address], [facility] [address]     Phone:  [phone]     carvedilol 6.25 MG tablet    furosemide 40 MG tablet                Primary Care Provider Office Phone # Fax #    Tre Lockett [phone]       [address]  [facility] [address]        Goals        General    #1 I will complete my health care directive. (pt-stated)     Notes - Note edited  8/21/2018 10:39 AM by Behl, Melissa K, RN    Goal Statement: I will complete my health care directive.  Measure of Success: Health care directive will be completed and scanned into chart.  Supportive Steps to Achieve: Patient has attended a health care directive class, 10/24/17 informed of CPR vs intubation  Barriers: is awaiting to see her  to finalize document  Strengths: has care coordination, home care and excellent family support  Date to Achieve By: 12/31/18  Patient expressed understanding of goal: yes             #2 Healthy Eating (pt-stated)     Notes - Note created  10/30/2018  3:58 PM by Behl, Melissa K, RN    Goal Statement: I will follow a low sodium diet.  Measure of Success: Patient will consistently eat a low sodium diet.  Supportive Steps to Achieve: RN CC will mail out low sodium diet ideas.  Family providing assistance.  Barriers: not always able to cook for self  Strengths: family support  Date to Achieve By: 12/30/18  Patient expressed understanding of goal: yes           Equal Access to Services     [facility]: Hadii rakesh schuster hadasho Soomaali, waaxda luqadaha, qaybta kaalmada adeegyada, wax marisabel brooks adenicolas moss . So [facility] [phone].    ATENCIÓN: Si habla español, tiene a merchant disposición servicios gratuitos de asistencia lingüística. Llame al [phone].    We comply with applicable federal civil rights laws and [place] laws. We do not discriminate  on the basis of race, color, national origin, age, disability, sex, sexual orientation, or gender identity.            Thank you!     Thank you for choosing [facility]YS[facility] AT [facility]  for your care. Our goal is always to provide you with excellent care. Hearing back from our patients is one way we can continue to improve our services. Please take a few minutes to complete the written survey that you may receive in the mail after your visit with us. Thank you!             Your Updated Medication List - Protect others around you: Learn how to safely use, store and throw away your medicines at www.disposemymeds.org.          This list is accurate as of 11/1/18 10:32 AM.  Always use your most recent med list.                   Brand Name Dispense Instructions for use Diagnosis    ACETAMINOPHEN PO      Take 1,000 mg by mouth 2 times daily as needed        albuterol (2.5 MG/3ML) 0.083% neb solution      Take 1 vial by nebulization every 6 hours as needed for shortness of breath / dyspnea or wheezing        amiodarone 200 MG tablet    PACERONE/CODARONE    120 tablet    1st week: 400mg twice a day. 2nd week: 200mg twice a day. Then 200mg daily    Paroxysmal atrial fibrillation (H)       apixaban ANTICOAGULANT 2.5 MG tablet    ELIQUIS    180 tablet    Take 1 tablet (2.5 mg) by mouth 2 times daily    Atrial flutter, paroxysmal (H)       azaTHIOprine 50 MG tablet    IMURAN    90 tablet    Take 1 tablet (50 mg) by mouth daily    Rheumatoid arthritis involving multiple sites with positive rheumatoid factor (H)       Blood Pressure Monitor Kit     1 kit    1 kit daily as needed    CHF (congestive heart failure) (H)       calcium carbonate 600 mg-vitamin D 400 units 600-400 MG-UNIT per tablet    CALTRATE     Take 1 tablet by mouth 2 times daily        carvedilol 6.25 MG tablet    COREG    60 tablet    Take 1 tablet (6.25 mg) by mouth 2 times daily (with meals)    Heart failure with preserved  ejection fraction, NYHA class I (H), Benign essential hypertension       cefdinir 300 MG capsule    OMNICEF    7 capsule    Take 1 capsule (300 mg) by mouth daily for 7 days    Dysuria       COMPOUNDED NON-CONTROLLED SUBSTANCE - PHARMACY TO MIX COMPOUNDED MEDICATION    CMPD RX    30 g    Estriol 1mg/gram. Place 1 gram vaginally daily for two weeks, then vaginally twice weekly after.    Atrophic vaginitis       fish oil-omega-3 fatty acids 1000 MG capsule      Take 2 g by mouth daily. 2 capsules daily        folic acid 1 MG tablet    FOLVITE    90 tablet    Take 1 tablet (1 mg) by mouth daily    Oral aphthae       furosemide 40 MG tablet    LASIX    60 tablet    Take 1 tablet (40mg) in the morning and take 1/2 tablet (20mg) in the evening.    Heart failure with preserved ejection fraction, NYHA class I (H)       GENTEAL MILD OP      Apply  to eye daily.        hydrocortisone 2.5 % cream     30 g    Apply BID to affected region(s) for 7-10 days.    Rash       levothyroxine 75 MCG tablet    SYNTHROID/LEVOTHROID    90 tablet    TAKE ONE TABLET BY MOUTH ONCE DAILY    Hypothyroidism, unspecified type       nitroGLYcerin 0.4 MG sublingual tablet    NITROSTAT    25 tablet    Place 1 tablet (0.4 mg) under the tongue every 5 minutes as needed for chest pain    Chest pain       * order for DME     1 Box    Equipment being ordered: Dispense face mask. Mrs. Spicer is immunosuppressed due to rheumatoid arthritis.    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 Use with Albuterol.    Hypoxia       order for DME     1 each    Equipment being ordered: Dispense baffle, for use with nebulizer.    Pneumonia of left upper lobe due to Mycoplasma pneumoniae       * order for DME     1 each    Dispense SP Walker-small    Pain of toe of right foot, Status post bunionectomy       ORENCIA IV      Inject into the vein every 28 days         PRESERVISION AREDS PO      Take 1 tablet by mouth daily        ranibizumab 0.3 MG/0.05ML Soln    LUCENTIS     0.3 mg by Intravitreal route Every 6 weeks        ranitidine 150 MG tablet    ZANTAC    60 tablet    Take 1 tablet (150 mg) by mouth 2 times daily    Gastroesophageal reflux disease without esophagitis       REFRESH P.M. Oint      Apply  to eye. Daily at bedtime        saccharomyces boulardii 250 MG capsule    FLORASTOR     Take 250 mg by mouth daily        senna-docusate 8.6-50 MG per tablet    SENOKOT-S;PERICOLACE    120 tablet    Take 2 tablets by mouth At Bedtime Hold if diarrhea occurs.    Constipation, chronic       VITAMIN C PO      Take 500 mg by mouth daily        ZYRTEC ALLERGY 10 MG tablet   Generic drug:  cetirizine      Take 10 mg by mouth At Bedtime        * Notice:  This list has 3 medication(s) that are the same as other medications prescribed for you. Read the directions carefully, and ask your doctor or other care provider to review them with you.

## 2018-11-02 ENCOUNTER — TELEPHONE (OUTPATIENT)
Dept: FAMILY MEDICINE | Facility: CLINIC | Age: 83
End: 2018-11-02

## 2018-11-02 NOTE — TELEPHONE ENCOUNTER
...Reason for Call: Request for an order or referral:    Order or referral being requested: Discharge physical therapy, today last day for home health physical therapy, transition to outpatient physical therapy    Date needed: as soon as possible    Has the patient been seen by the PCP for this problem? YES    Additional comments:     Phone number Patient can be reached at:  [phone]    Best Time:  anytime    Can we leave a detailed message on this number?  YES    Call taken on 11/2/2018 at 12:15 PM by Leonarda Gonzalez

## 2018-11-02 NOTE — TELEPHONE ENCOUNTER
[facility] is calling to request outpatient PT referral be placed for the patient. She is noting that the patient is discharging early from home care. She states that the patient lives at [facility] and they have PT on site.    She also noted that the patient is interested in a dietary consultation.    Sena Lloyd RN, [facility] Triage

## 2018-11-08 ENCOUNTER — DOCUMENTATION ONLY (OUTPATIENT)
Dept: LAB | Facility: CLINIC | Age: 83
End: 2018-11-08

## 2018-11-08 DIAGNOSIS — I50.32 CHRONIC DIASTOLIC CONGESTIVE HEART FAILURE (H): ICD-10-CM

## 2018-11-08 DIAGNOSIS — Z79.899 HIGH RISK MEDICATION USE: ICD-10-CM

## 2018-11-08 DIAGNOSIS — M05.79 RHEUMATOID ARTHRITIS INVOLVING MULTIPLE SITES WITH POSITIVE RHEUMATOID FACTOR (H): ICD-10-CM

## 2018-11-08 DIAGNOSIS — N39.0 RECURRENT UTI: Primary | ICD-10-CM

## 2018-11-08 LAB
ALBUMIN SERPL-MCNC: 3.4 G/DL (ref 3.4–5)
ALP SERPL-CCNC: 57 U/L (ref 40–150)
ALT SERPL W P-5'-P-CCNC: 25 U/L (ref 0–50)
ANION GAP SERPL CALCULATED.3IONS-SCNC: 8 MMOL/L (ref 3–14)
AST SERPL W P-5'-P-CCNC: 19 U/L (ref 0–45)
BASOPHILS # BLD AUTO: 0 10E9/L (ref 0–0.2)
BASOPHILS NFR BLD AUTO: 0.3 %
BILIRUB DIRECT SERPL-MCNC: 0.1 MG/DL (ref 0–0.2)
BILIRUB SERPL-MCNC: 0.4 MG/DL (ref 0.2–1.3)
BUN SERPL-MCNC: 39 MG/DL (ref 7–30)
CALCIUM SERPL-MCNC: 9.1 MG/DL (ref 8.5–10.1)
CHLORIDE SERPL-SCNC: 102 MMOL/L (ref 94–109)
CO2 SERPL-SCNC: 30 MMOL/L (ref 20–32)
CREAT SERPL-MCNC: 1.47 MG/DL (ref 0.52–1.04)
DIFFERENTIAL METHOD BLD: ABNORMAL
EOSINOPHIL # BLD AUTO: 0.2 10E9/L (ref 0–0.7)
EOSINOPHIL NFR BLD AUTO: 2.2 %
ERYTHROCYTE [DISTWIDTH] IN BLOOD BY AUTOMATED COUNT: 13.3 % (ref 10–15)
GFR SERPL CREATININE-BSD FRML MDRD: 34 ML/MIN/1.7M2
GLUCOSE SERPL-MCNC: 116 MG/DL (ref 70–99)
HCT VFR BLD AUTO: 35.2 % (ref 35–47)
HGB BLD-MCNC: 11.7 G/DL (ref 11.7–15.7)
LYMPHOCYTES # BLD AUTO: 1.1 10E9/L (ref 0.8–5.3)
LYMPHOCYTES NFR BLD AUTO: 16.3 %
MCH RBC QN AUTO: 33.9 PG (ref 26.5–33)
MCHC RBC AUTO-ENTMCNC: 33.2 G/DL (ref 31.5–36.5)
MCV RBC AUTO: 102 FL (ref 78–100)
MONOCYTES # BLD AUTO: 0.6 10E9/L (ref 0–1.3)
MONOCYTES NFR BLD AUTO: 8.5 %
NEUTROPHILS # BLD AUTO: 5 10E9/L (ref 1.6–8.3)
NEUTROPHILS NFR BLD AUTO: 72.7 %
PLATELET # BLD AUTO: 266 10E9/L (ref 150–450)
POTASSIUM SERPL-SCNC: 3.9 MMOL/L (ref 3.4–5.3)
PROT SERPL-MCNC: 7.4 G/DL (ref 6.8–8.8)
RBC # BLD AUTO: 3.45 10E12/L (ref 3.8–5.2)
SODIUM SERPL-SCNC: 140 MMOL/L (ref 133–144)
WBC # BLD AUTO: 6.9 10E9/L (ref 4–11)

## 2018-11-08 PROCEDURE — 36415 COLL VENOUS BLD VENIPUNCTURE: CPT | Performed by: PHYSICIAN ASSISTANT

## 2018-11-08 PROCEDURE — 80076 HEPATIC FUNCTION PANEL: CPT | Performed by: PHYSICIAN ASSISTANT

## 2018-11-08 PROCEDURE — 85025 COMPLETE CBC W/AUTO DIFF WBC: CPT | Performed by: PHYSICIAN ASSISTANT

## 2018-11-08 PROCEDURE — 80048 BASIC METABOLIC PNL TOTAL CA: CPT | Performed by: PHYSICIAN ASSISTANT

## 2018-11-08 NOTE — PROGRESS NOTES
Patient came in for a lab appointment today 11/08/2018. Said she was supposed to leave a urine sample for you.  Please review chart and send orders.    Thank you,  Genet Guillory

## 2018-11-09 ENCOUNTER — CARE COORDINATION (OUTPATIENT)
Dept: CARDIOLOGY | Facility: CLINIC | Age: 83
End: 2018-11-09

## 2018-11-09 ENCOUNTER — OFFICE VISIT (OUTPATIENT)
Dept: UROLOGY | Facility: CLINIC | Age: 83
End: 2018-11-09
Payer: MEDICARE

## 2018-11-09 ENCOUNTER — TELEPHONE (OUTPATIENT)
Dept: UROLOGY | Facility: CLINIC | Age: 83
End: 2018-11-09

## 2018-11-09 ENCOUNTER — MEDICAL CORRESPONDENCE (OUTPATIENT)
Dept: HEALTH INFORMATION MANAGEMENT | Facility: CLINIC | Age: 83
End: 2018-11-09

## 2018-11-09 VITALS — RESPIRATION RATE: 18 BRPM | OXYGEN SATURATION: 95 % | HEART RATE: 61 BPM

## 2018-11-09 DIAGNOSIS — N39.0 RECURRENT UTI: Primary | ICD-10-CM

## 2018-11-09 DIAGNOSIS — I50.30 HEART FAILURE WITH PRESERVED EJECTION FRACTION, NYHA CLASS I (H): ICD-10-CM

## 2018-11-09 DIAGNOSIS — R82.90 NONSPECIFIC FINDING ON EXAMINATION OF URINE: ICD-10-CM

## 2018-11-09 LAB
ALBUMIN UR-MCNC: NEGATIVE MG/DL
APPEARANCE UR: CLEAR
BILIRUB UR QL STRIP: NEGATIVE
COLOR UR AUTO: ABNORMAL
GLUCOSE UR STRIP-MCNC: NEGATIVE MG/DL
HGB UR QL STRIP: NEGATIVE
KETONES UR STRIP-MCNC: NEGATIVE MG/DL
LEUKOCYTE ESTERASE UR QL STRIP: ABNORMAL
NITRATE UR QL: NEGATIVE
NON-SQ EPI CELLS #/AREA URNS LPF: ABNORMAL /LPF
PH UR STRIP: 6.5 PH (ref 5–7)
RBC #/AREA URNS AUTO: ABNORMAL /HPF
SOURCE: ABNORMAL
SP GR UR STRIP: 1.01 (ref 1–1.03)
UROBILINOGEN UR STRIP-MCNC: NORMAL MG/DL (ref 0–2)
WBC #/AREA URNS AUTO: ABNORMAL /HPF

## 2018-11-09 PROCEDURE — 87086 URINE CULTURE/COLONY COUNT: CPT | Performed by: PHYSICIAN ASSISTANT

## 2018-11-09 PROCEDURE — 99213 OFFICE O/P EST LOW 20 MIN: CPT | Performed by: PHYSICIAN ASSISTANT

## 2018-11-09 PROCEDURE — 87186 SC STD MICRODIL/AGAR DIL: CPT | Performed by: PHYSICIAN ASSISTANT

## 2018-11-09 PROCEDURE — 87088 URINE BACTERIA CULTURE: CPT | Performed by: PHYSICIAN ASSISTANT

## 2018-11-09 PROCEDURE — 81001 URINALYSIS AUTO W/SCOPE: CPT | Performed by: PHYSICIAN ASSISTANT

## 2018-11-09 RX ORDER — FUROSEMIDE 20 MG
20 TABLET ORAL 2 TIMES DAILY
Qty: 60 TABLET | Refills: 3 | Status: SHIPPED | OUTPATIENT
Start: 2018-11-09 | End: 2018-11-14

## 2018-11-09 RX ORDER — SPIRONOLACTONE 25 MG/1
12.5 TABLET ORAL DAILY
Qty: 30 TABLET | Refills: 1 | Status: ON HOLD | OUTPATIENT
Start: 2018-11-09 | End: 2018-12-04

## 2018-11-09 RX ORDER — TRIMETHOPRIM 100 MG/1
50 TABLET ORAL DAILY
Qty: 45 TABLET | Refills: 1 | Status: ON HOLD | OUTPATIENT
Start: 2018-11-09 | End: 2018-12-04

## 2018-11-09 ASSESSMENT — PAIN SCALES - GENERAL: PAINLEVEL: NO PAIN (0)

## 2018-11-09 NOTE — MR AVS SNAPSHOT
After Visit Summary   11/9/2018    Linda Spicer    MRN: [id number]           Patient Information     Date Of Birth          6/13/1931        Visit Information        Provider Department      11/9/2018 10:30 AM Asia Steven PA-C [facility]        Today's Diagnoses     Recurrent UTI    -  1    Nonspecific finding on examination of urine           Follow-ups after your visit        Your next 10 appointments already scheduled     Nov 21, 2018  8:30 AM CST   Lab with UC LAB    Health Lab ([facility])    [address] [place] [address]  1st Floor  [facility] [address]   [phone]            Nov 21, 2018  9:00 AM CST   FULL PULMONARY FUNCTION with  PFL C   [facility] Pulmonary Function Testing ([facility])    [address] [place] [address]  3rd [facility] [id number] [phone]            Nov 21, 2018 10:30 AM CST   (Arrive by 10:15 AM)   Pacemaker Check with  Cv Device 1   [facility] ([facility])    [address] [place] [address] Floor  [id number]               Nov 21, 2018 11:30 AM CST   (Arrive by 11:15 AM)   RETURN ARRHYTHMIA with Franki Carrasquillo MD   [facility] ([facility])    [address] [place] [address]  [address]  [facility] [id number] [phone]            Nov 26, 2018  1:00 PM CST   Level 1 with BAY 8 INFUSION   [facility] ([facility])    [address] [place] [id number] [phone]            Dec 11, 2018 10:00 AM CST   Office Visit with PFT LAB   [facility] ([facility])    [address] [place] [id number] [phone]           Bring a current list of meds and any records pertaining to this visit. For Physicals, please bring immunization records and any forms needing to be filled out. Please arrive 10 minutes early to complete paperwork.            Dec  11, 2018 11:00 AM CST   Return Visit with Dea Acosta MD   [facility] ([facility])    [address] [place] [id number] [phone]            Dec 14, 2018  9:45 AM CST   LAB with BK LAB   [facility] ([facility])    [address] [facility] [id number] [phone]           Please do not eat 10-12 hours before your appointment if you are coming in fasting for labs on lipids, cholesterol, or glucose (sugar). This does not apply to pregnant women. Water, hot tea and black coffee (with nothing added) are okay. Do not drink other fluids, diet soda or chew gum.            Dec 17, 2018  9:30 AM CST   Core Return with Karla Mabry NP   [facility] AT [facility] ([facility])    [address] [place] [address] 2nd [facility] [address]   [phone]            Dec 27, 2018  9:00 AM CST   Level 1 with BAY 9 [facility] ([facility])    [address] [place] [id number] [phone]              Who to contact     If you have questions or need follow up information about today's clinic visit or your schedule please contact [facility] directly at [phone].  Normal or non-critical lab and imaging results will be communicated to you by MyChart, letter or phone within 4 business days after the clinic has received the results. If you do not hear from us within 7 days, please contact the clinic through [employer] or phone. If you have a critical or abnormal lab result, we will notify you by phone as soon as possible.  Submit refill requests through [employer] or call your pharmacy and they will forward the refill request to us. Please allow 3 business days for your refill to be completed.          Additional Information About Your Visit        [employer] Information     [employer] gives you secure  access to your electronic health record. If you see a primary care provider, you can also send messages to your care team and make appointments. If you have questions, please call your primary care clinic.  If you do not have a primary care provider, please call [phone] and they will assist you.      [employer] is an electronic gateway that provides easy, online access to your medical records. With [employer], you can request a clinic appointment, read your test results, renew a prescription or communicate with your care team.     To access your existing account, please contact your [facility] or call [phone] for assistance.        Care EveryWhere ID     This is your Care EveryWhere ID. This could be used by other organizations to access your [facility] medical records  [id number]        Your Vitals Were     Pulse Respirations Last Period Pulse Oximetry          61 18 (LMP Unknown) 95%         Blood Pressure from Last 3 Encounters:   11/01/18 125/77   10/29/18 132/75   10/24/18 116/73    Weight from Last 3 Encounters:   11/01/18 73.1 kg (161 lb 1 oz)   10/29/18 72.6 kg (160 lb)   10/24/18 72.5 kg (159 lb 14.4 oz)              We Performed the Following     UA reflex to Microscopic and Culture     Urine Culture Aerobic Bacterial     Urine Microscopic          Today's Medication Changes          These changes are accurate as of 11/9/18  1:27 PM.  If you have any questions, ask your nurse or doctor.               Start taking these medicines.        Dose/Directions    trimethoprim 100 MG tablet   Commonly known as:  TRIMPEX   Used for:  Recurrent UTI   Started by:  Asia Steven PA-C        Dose:  50 mg   Take 0.5 tablets (50 mg) by mouth daily   Quantity:  45 tablet   Refills:  1            Where to get your medicines      These medications were sent to [facility] [address] [place] [address] - [address] [facility]  [address] [facility] [address]     Phone:   [phone]     trimethoprim 100 MG tablet                Primary Care Provider Office Phone # Fax #    Tre Lockett [phone]       [address]  [facility] [address]        Goals        General    #1 I will complete my health care directive. (pt-stated)     Notes - Note edited  8/21/2018 10:39 AM by Behl, Melissa K, RN    Goal Statement: I will complete my health care directive.  Measure of Success: Health care directive will be completed and scanned into chart.  Supportive Steps to Achieve: Patient has attended a health care directive class, 10/24/17 informed of CPR vs intubation  Barriers: is awaiting to see her  to finalize document  Strengths: has care coordination, home care and excellent family support  Date to Achieve By: 12/31/18  Patient expressed understanding of goal: yes             #2 Healthy Eating (pt-stated)     Notes - Note created  10/30/2018  3:58 PM by Behl, Melissa K, RN    Goal Statement: I will follow a low sodium diet.  Measure of Success: Patient will consistently eat a low sodium diet.  Supportive Steps to Achieve: RN CC will mail out low sodium diet ideas.  Family providing assistance.  Barriers: not always able to cook for self  Strengths: family support  Date to Achieve By: 12/30/18  Patient expressed understanding of goal: yes           Equal Access to Services     ITALO [facility] Center[facility]ERON : Hadii rakesh schuster hadasho Soomaali, waaxda luqadaha, qaybta kaalmada adeegyada, waxay marisabel skinner. So [facility] [phone].    ATENCIÓN: Si habla español, tiene a merchant disposición servicios gratuitos de asistencia lingüística. Llgallito al [phone].    We comply with applicable federal civil rights laws and [place] laws. We do not discriminate on the basis of race, color, national origin, age, disability, sex, sexual orientation, or gender identity.            Thank you!     Thank you for choosing [facility]  for your care. Our goal is  always to provide you with excellent care. Hearing back from our patients is one way we can continue to improve our services. Please take a few minutes to complete the written survey that you may receive in the mail after your visit with us. Thank you!             Your Updated Medication List - Protect others around you: Learn how to safely use, store and throw away your medicines at www.disposemymeds.org.          This list is accurate as of 11/9/18  1:27 PM.  Always use your most recent med list.                   Brand Name Dispense Instructions for use Diagnosis    ACETAMINOPHEN PO      Take 1,000 mg by mouth 2 times daily as needed        albuterol (2.5 MG/3ML) 0.083% neb solution      Take 1 vial by nebulization every 6 hours as needed for shortness of breath / dyspnea or wheezing        amiodarone 200 MG tablet    PACERONE/CODARONE    120 tablet    1st week: 400mg twice a day. 2nd week: 200mg twice a day. Then 200mg daily    Paroxysmal atrial fibrillation (H)       apixaban ANTICOAGULANT 2.5 MG tablet    ELIQUIS    180 tablet    Take 1 tablet (2.5 mg) by mouth 2 times daily    Atrial flutter, paroxysmal (H)       azaTHIOprine 50 MG tablet    IMURAN    90 tablet    Take 1 tablet (50 mg) by mouth daily    Rheumatoid arthritis involving multiple sites with positive rheumatoid factor (H)       Blood Pressure Monitor Kit     1 kit    1 kit daily as needed    CHF (congestive heart failure) (H)       calcium carbonate 600 mg-vitamin D 400 units 600-400 MG-UNIT per tablet    CALTRATE     Take 1 tablet by mouth 2 times daily        carvedilol 6.25 MG tablet    COREG    60 tablet    Take 1 tablet (6.25 mg) by mouth 2 times daily (with meals)    Heart failure with preserved ejection fraction, NYHA class I (H), Benign essential hypertension       COMPOUNDED NON-CONTROLLED SUBSTANCE - PHARMACY TO MIX COMPOUNDED MEDICATION    CMPD RX    30 g    Estriol 1mg/gram. Place 1 gram vaginally daily for two weeks, then vaginally  twice weekly after.    Atrophic vaginitis       fish oil-omega-3 fatty acids 1000 MG capsule      Take 2 g by mouth daily. 2 capsules daily        folic acid 1 MG tablet    FOLVITE    90 tablet    Take 1 tablet (1 mg) by mouth daily    Oral aphthae       furosemide 40 MG tablet    LASIX    60 tablet    Take 1 tablet (40mg) in the morning and take 1/2 tablet (20mg) in the evening.    Heart failure with preserved ejection fraction, NYHA class I (H)       GENTEAL MILD OP      Apply  to eye daily.        hydrocortisone 2.5 % cream     30 g    Apply BID to affected region(s) for 7-10 days.    Rash       levothyroxine 75 MCG tablet    SYNTHROID/LEVOTHROID    90 tablet    TAKE ONE TABLET BY MOUTH ONCE DAILY    Hypothyroidism, unspecified type       nitroGLYcerin 0.4 MG sublingual tablet    NITROSTAT    25 tablet    Place 1 tablet (0.4 mg) under the tongue every 5 minutes as needed for chest pain    Chest pain       * order for DME     1 Box    Equipment being ordered: Dispense face mask. Mrs. Spicer is immunosuppressed due to rheumatoid arthritis.    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 Use with Albuterol.    Hypoxia       order for DME     1 each    Equipment being ordered: Dispense baffle, for use with nebulizer.    Pneumonia of left upper lobe due to Mycoplasma pneumoniae       * order for DME     1 each    Dispense SP Walker-small    Pain of toe of right foot, Status post bunionectomy       ORENCIA IV      Inject into the vein every 28 days        PRESERVISION AREDS PO      Take 1 tablet by mouth daily        ranibizumab 0.3 MG/0.05ML Soln    LUCENTIS     0.3 mg by Intravitreal route Every 6 weeks        ranitidine 150 MG tablet    ZANTAC    60 tablet    Take 1 tablet (150 mg) by mouth 2 times daily    Gastroesophageal reflux disease without esophagitis       REFRESH P.M. Oint      Apply  to eye. Daily at  bedtime        saccharomyces boulardii 250 MG capsule    FLORASTOR     Take 250 mg by mouth daily        senna-docusate 8.6-50 MG per tablet    SENOKOT-S;PERICOLACE    120 tablet    Take 2 tablets by mouth At Bedtime Hold if diarrhea occurs.    Constipation, chronic       trimethoprim 100 MG tablet    TRIMPEX    45 tablet    Take 0.5 tablets (50 mg) by mouth daily    Recurrent UTI       VITAMIN C PO      Take 500 mg by mouth daily        ZYRTEC ALLERGY 10 MG tablet   Generic drug:  cetirizine      Take 10 mg by mouth At Bedtime        * Notice:  This list has 3 medication(s) that are the same as other medications prescribed for you. Read the directions carefully, and ask your doctor or other care provider to review them with you.

## 2018-11-09 NOTE — TELEPHONE ENCOUNTER
[facility] Call Center    Phone Message    May a detailed message be left on voicemail: yes    Reason for Call: Linda called to say the Estradiol she had been using was  and is not sure if that is why she has an infection and if she should start the trimethoprim (TRIMPEX) 100 MG tablet that was prescribed.  Requesting a call to discuss.  Thank you.     Action Taken: Message routed to:  Adult Clinics: Urology p [phone]

## 2018-11-09 NOTE — PROGRESS NOTES
Reviewed below information with ARTEM Lui. Called Linda back. No answer. Per her request I left a detailed voicemail with following orders.     Date: 11/9/2018    Time of Call: 3:20 PM     Diagnosis:  Diastolic heart failure     [ VORB ] Ordering provider: ARTEM Lui  Order: Take 40 mg Lasix this evening. Take 40mg/20mg Lasix tomorrow. Starting Sunday decrease to 20 mg BID. Start taking Spironolactone 12.5 mg daily. This was sent to your pharmacy.      Order received by: Lizett Zapata RN      Follow-up/additional notes: also  Needs BMP in one week. I will call her on Monday to arrange this.

## 2018-11-09 NOTE — PROGRESS NOTES
Called Linda to check in. Labs reviewed. Per Linda she has been holding on to fluid even on increased dose of Lasix. Her weight is up 3-4 lbs since she saw us. She states she has increased fluid in her legs and abdomen. Doesn't notice any change to her breathing. She is hesitant to decrease her Lasix. Told her I would review with Rochelle and call her back.     Lizett Zapata RN

## 2018-11-09 NOTE — NURSING NOTE
Linda Spicer's goals for this visit include:   Chief Complaint   Patient presents with     Follow Up For     recurrent UTI's. Pt states just got UTI a little over a week ago for the third time in about one month. Pt done with antibitiotics now.       She requests these members of her care team be copied on today's visit information:     PCP: Tre Lockett    Referring Provider:  No referring provider defined for this encounter.    Pulse 61  Resp 18  LMP  (LMP Unknown)  SpO2 95%    Do you need any medication refills at today's visit? No    Lucia Degroot LPN

## 2018-11-09 NOTE — PROGRESS NOTES
UROLOGY OFFICE VISIT - FOLLOW UP    REASON FOR VISIT: follow up recurrent UTI    HPI: Ms. Linda Spicer is an 87 year old female who returns to the urology clinic for follow up of recurrent UTI's. Seen in initial consultation on 3/2/18 - please see consult note from this date for full history. In brief, she has had issues with frequent UTI's for a few years, but they have been worsening since November 2017. She reports having 3 infections from November to March 2018 (see below). Of note, she typically does not have any symptoms when she gets diagnosed with a UTI - she just requests urine samples to be checked when she is in clinic which then come back positive. She has never had a febrile UTI or been hospitalized for UTI's. She was started on daily low dose Cipro by her PCP in the Spring of 2017 which worked well to reduce her infections but caused a rash so she eventually stopped it. At an office visit on 3/2/18, her PVR was noted to be somewhat elevated to 202 mL. She was therefore encouraged to work on double voiding. Also started on vaginal estrogen cream and recommended to not check urine samples unless she has urinary symptoms concerning for a possible infection. Otherwise, this may just be asymptomatic bacteriuria which does not necessarily require treatment. At a follow up visit on 6/1/18, she was doing well with no further infections since starting vaginal estrogen cream. PVR was still elevated to 260 mL at that time so she was offered instruction in CIC but declined. She elected to continue double voiding.    She returns for another follow up today and reports 3 infections in the past 2 months (all culture confirmed - see below). Recently completed a 7 day course of Cefdinir a few days ago. She currently feels well with no urinary symptoms. Symptoms with her recent infections include strong odor, hesitancy, leakage. She is still using the vaginal estrogen cream twice weekly. Of note, her  passed  away on September 5 of this year so she has been under more stress than usual.     REVIEW OF DIAGNOSTICS:   10/30/18 - UC: >100K Klebsiella pneumoniae  10/11/18 - UC: >100K Citrobacter freundii complex  9/27/18 - UC: >100K Klebsiella pneumoniae  7/31/18 - UA: negative  7/26/18 - UA: negative  5/14/18 - UA: negative  2/13/18 - UA: negative --> UC: 10-50K coag neg Staph, >100K mixed  osvaldo  1/22/18 - UA: negative  1/15/18 - UA: negative  1/4/18 - UA: large blood, large LE, >100 WBC, 10-25 RBC, few bacteria --> UC: 10-50K mixed  osvaldo  12/4/17 -  UA: negative  11/27/17 - UA: trace LE, 0-2 WBC, 0-2 RBC, few bacteria  11/25/17 - UA: negative  11/21/17 - UA: small blood, large LE, >100 WBC, 2-5 RBC, many bacteria --> UC: >100K Citrobacter amalonaticus  8/30/17 - UA: negative  7/21/17 - UA: negative  6/15/17 - UA: negative  2/3/17 - UA: negative    PEx  Pulse 61  Resp 18  LMP  (LMP Unknown)  SpO2 95%  GEN: well-appearing female in NAD  RESP: no increased respiratory effort    PVR: 18 mL measured by ultrasound today    A/P  88 yo female with recurrent UTI's vs. asymptomatic bacteriuria. Initially, she did well following initiation of vaginal estrogen therapy with no infections from March-September. She has now had 3 culture-confirmed infections in the past 2 months. She does appear to be emptying better today (PVR 18 mL, down from 260 mL last time). We therefore discussed trying a low dose antibiotic for daily prophylaxis. She would like to give this a try.  -Start trimethoprim 50 mg at bedtime for UTI prophylaxis.  -Continue vaginal estrogen cream twice weekly.  -Continue double voiding.      Follow up in 3 months to recheck. If no further infections, consider stopping trimethoprim at that time. If infections persist, will need CT urogram and cystoscopy to check for nidus for infections. Call or RTC sooner with any issues.     Asia Steven PA-C  Department of Urology

## 2018-11-11 LAB
BACTERIA SPEC CULT: ABNORMAL
BACTERIA SPEC CULT: ABNORMAL
SPECIMEN SOURCE: ABNORMAL

## 2018-11-12 ENCOUNTER — TRANSFERRED RECORDS (OUTPATIENT)
Dept: HEALTH INFORMATION MANAGEMENT | Facility: CLINIC | Age: 83
End: 2018-11-12

## 2018-11-12 ENCOUNTER — ALLIED HEALTH/NURSE VISIT (OUTPATIENT)
Dept: CARDIOLOGY | Facility: CLINIC | Age: 83
End: 2018-11-12
Attending: INTERNAL MEDICINE
Payer: MEDICARE

## 2018-11-12 ENCOUNTER — TELEPHONE (OUTPATIENT)
Dept: CARDIOLOGY | Facility: CLINIC | Age: 83
End: 2018-11-12

## 2018-11-12 DIAGNOSIS — I48.92 ATRIAL FLUTTER (H): Primary | ICD-10-CM

## 2018-11-12 PROCEDURE — 93296 REM INTERROG EVL PM/IDS: CPT | Mod: ZF

## 2018-11-12 PROCEDURE — 93294 REM INTERROG EVL PM/LDLS PM: CPT | Performed by: INTERNAL MEDICINE

## 2018-11-12 NOTE — TELEPHONE ENCOUNTER
Discussed with Asia Steven PA-C on 11/9/18. Per Asia Steven PA-C, pt should still take trimethoprim as prescribed. Pt should contact [facility] to refill Estradiol cream as well.    Minna Armenta LPN

## 2018-11-12 NOTE — MR AVS SNAPSHOT
After Visit Summary   11/12/2018    Linda Spicer    MRN: [id number]           Patient Information     Date Of Birth          6/13/1931        Visit Information        Provider Department      11/12/2018 6:00 AM UC ICD REMOTE [facility]        Today's Diagnoses     Atrial flutter (H)    -  1       Follow-ups after your visit        Your next 10 appointments already scheduled     Nov 21, 2018  8:30 AM CST   Lab with UC LAB    Health Lab ([facility])    [address]  1st Floor  [facility] [address]   [phone]            Nov 21, 2018  9:00 AM CST   FULL PULMONARY FUNCTION with UC PFL C   [facility] Pulmonary Function Testing ([facility])    [address]  3rd Floor  [facility] [address]   [phone]            Nov 21, 2018 10:30 AM CST   (Arrive by 10:15 AM)   Pacemaker Check with Uc Cv Device 1   [facility] ([facility])    [address]  3rd Floor  [address]               Nov 21, 2018 11:30 AM CST   (Arrive by 11:15 AM)   RETURN ARRHYTHMIA with Franki Carrasquillo MD   [facility] ([facility])    [address] [place] [address]  [address]  [facility] [id number] [phone]            Nov 26, 2018  1:00 PM CST   Level 1 with BAY 8 INFUSION   [facility] ([facility])    [address] [place] [id number] [phone]            Dec 11, 2018 10:00 AM CST   Office Visit with PFT LAB   [facility] ([facility])    [address] [place] [id number] [phone]           Bring a current list of meds and any records pertaining to this visit. For Physicals, please bring immunization records and any forms needing to be filled out. Please arrive 10 minutes early to complete paperwork.            Dec 11, 2018 11:00 AM CST   Return Visit with Dea Acosta MD    [facility] ([facility])    [address] [place] [address]   [phone]            Dec 14, 2018  9:45 AM CST   LAB with BK LAB   [facility] ([facility])    [address] [facility] [address]   [phone]           Please do not eat 10-12 hours before your appointment if you are coming in fasting for labs on lipids, cholesterol, or glucose (sugar). This does not apply to pregnant women. Water, hot tea and black coffee (with nothing added) are okay. Do not drink other fluids, diet soda or chew gum.            Dec 17, 2018  9:30 AM CST   Core Return with Karla Mabry NP   [facility] ([facility])    [address] [place] [address]   [phone]            Dec 27, 2018  9:00 AM CST   Level 1 with [address] ([facility])    [address] [place] [address]   [phone]              Who to contact     If you have questions or need follow up information about today's clinic visit or your schedule please contact [facility] directly at [phone].  Normal or non-critical lab and imaging results will be communicated to you by MyChart, letter or phone within 4 business days after the clinic has received the results. If you do not hear from us within 7 days, please contact the clinic through [employer]orph[employer] or phone. If you have a critical or abnormal lab result, we will notify you by phone as soon as possible.  Submit refill requests through [employer] or call your pharmacy and they will forward the refill request to us. Please allow 3 business days for your refill to be completed.          Additional Information About Your Visit        [employer] Information     [employer] gives you secure access to your electronic health record. If you see a primary care provider,  you can also send messages to your care team and make appointments. If you have questions, please call your primary care clinic.  If you do not have a primary care provider, please call [phone] and they will assist you.        Care EveryWhere ID     This is your Care EveryWhere ID. This could be used by other organizations to access your [place] medical records  [id number]        Your Vitals Were     Last Period                   (LMP Unknown)            Blood Pressure from Last 3 Encounters:   11/01/18 125/77   10/29/18 132/75   10/24/18 116/73    Weight from Last 3 Encounters:   11/01/18 73.1 kg (161 lb 1 oz)   10/29/18 72.6 kg (160 lb)   10/24/18 72.5 kg (159 lb 14.4 oz)              We Performed the Following     INTERROGATION DEVICE EVAL REMOTE, PACER/ICD        Primary Care Provider Office Phone # Fax #    Tre Lockett [phone]       [address]  [facility] [address]        Goals        General    #1 I will complete my health care directive. (pt-stated)     Notes - Note edited  8/21/2018 10:39 AM by Behl, Melissa K, RN    Goal Statement: I will complete my health care directive.  Measure of Success: Health care directive will be completed and scanned into chart.  Supportive Steps to Achieve: Patient has attended a health care directive class, 10/24/17 informed of CPR vs intubation  Barriers: is awaiting to see her  to finalize document  Strengths: has care coordination, home care and excellent family support  Date to Achieve By: 12/31/18  Patient expressed understanding of goal: yes             #2 Healthy Eating (pt-stated)     Notes - Note created  10/30/2018  3:58 PM by Behl, Melissa K, RN    Goal Statement: I will follow a low sodium diet.  Measure of Success: Patient will consistently eat a low sodium diet.  Supportive Steps to Achieve: RN CC will mail out low sodium diet ideas.  Family providing assistance.  Barriers: not always able to cook for  self  Strengths: family support  Date to Achieve By: 12/30/18  Patient expressed understanding of goal: yes           Equal Access to Services     [facility] : Hadii aad ku hadmyrajason Goyal, bebetoda noeldanyha, leena bazzifidencio harrisryannefidencio, waxcameron marisabel angelitacapri quezadairmaaddy skinner. So [facility] [phone].    ATENCIÓN: Si habla español, tiene a merchant disposición servicios gratuitos de asistencia lingüística. Llame al [phone].    We comply with applicable federal civil rights laws and [place] laws. We do not discriminate on the basis of race, color, national origin, age, disability, sex, sexual orientation, or gender identity.            Thank you!     Thank you for choosing [facility]  for your care. Our goal is always to provide you with excellent care. Hearing back from our patients is one way we can continue to improve our services. Please take a few minutes to complete the written survey that you may receive in the mail after your visit with us. Thank you!             Your Updated Medication List - Protect others around you: Learn how to safely use, store and throw away your medicines at www.disposemymeds.org.          This list is accurate as of 11/12/18 11:59 PM.  Always use your most recent med list.                   Brand Name Dispense Instructions for use Diagnosis    ACETAMINOPHEN PO      Take 1,000 mg by mouth 2 times daily as needed        albuterol (2.5 MG/3ML) 0.083% neb solution      Take 1 vial by nebulization every 6 hours as needed for shortness of breath / dyspnea or wheezing        amiodarone 200 MG tablet    PACERONE/CODARONE    120 tablet    1st week: 400mg twice a day. 2nd week: 200mg twice a day. Then 200mg daily    Paroxysmal atrial fibrillation (H)       apixaban ANTICOAGULANT 2.5 MG tablet    ELIQUIS    180 tablet    Take 1 tablet (2.5 mg) by mouth 2 times daily    Atrial flutter, paroxysmal (H)       azaTHIOprine 50 MG tablet    IMURAN    90 tablet    Take 1 tablet (50 mg) by  mouth daily    Rheumatoid arthritis involving multiple sites with positive rheumatoid factor (H)       Blood Pressure Monitor Kit     1 kit    1 kit daily as needed    CHF (congestive heart failure) (H)       calcium carbonate 600 mg-vitamin D 400 units 600-400 MG-UNIT per tablet    CALTRATE     Take 1 tablet by mouth 2 times daily        carvedilol 6.25 MG tablet    COREG    60 tablet    Take 1 tablet (6.25 mg) by mouth 2 times daily (with meals)    Heart failure with preserved ejection fraction, NYHA class I (H), Benign essential hypertension       COMPOUNDED NON-CONTROLLED SUBSTANCE - PHARMACY TO MIX COMPOUNDED MEDICATION    CMPD RX    30 g    Estriol 1mg/gram. Place 1 gram vaginally daily for two weeks, then vaginally twice weekly after.    Atrophic vaginitis       fish oil-omega-3 fatty acids 1000 MG capsule      Take 2 g by mouth daily. 2 capsules daily        folic acid 1 MG tablet    FOLVITE    90 tablet    Take 1 tablet (1 mg) by mouth daily    Oral aphthae       furosemide 20 MG tablet    LASIX    60 tablet    Take 1 tablet (20 mg) by mouth 2 times daily    Heart failure with preserved ejection fraction, NYHA class I (H)       GENTEAL MILD OP      Apply  to eye daily.        hydrocortisone 2.5 % cream     30 g    Apply BID to affected region(s) for 7-10 days.    Rash       levothyroxine 75 MCG tablet    SYNTHROID/LEVOTHROID    90 tablet    TAKE ONE TABLET BY MOUTH ONCE DAILY    Hypothyroidism, unspecified type       nitroGLYcerin 0.4 MG sublingual tablet    NITROSTAT    25 tablet    Place 1 tablet (0.4 mg) under the tongue every 5 minutes as needed for chest pain    Chest pain       * order for DME     1 Box    Equipment being ordered: Dispense face mask. Mrs. Spicer is immunosuppressed due to rheumatoid arthritis.    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 Use with Albuterol.    Hypoxia       order  for DME     1 each    Equipment being ordered: Dispense baffle, for use with nebulizer.    Pneumonia of left upper lobe due to Mycoplasma pneumoniae       * order for DME     1 each    Dispense SP Walker-small    Pain of toe of right foot, Status post bunionectomy       ORENCIA IV      Inject into the vein every 28 days        PRESERVISION AREDS PO      Take 1 tablet by mouth daily        ranibizumab 0.3 MG/0.05ML Soln    LUCENTIS     0.3 mg by Intravitreal route Every 6 weeks        ranitidine 150 MG tablet    ZANTAC    60 tablet    Take 1 tablet (150 mg) by mouth 2 times daily    Gastroesophageal reflux disease without esophagitis       REFRESH P.M. Oint      Apply  to eye. Daily at bedtime        saccharomyces boulardii 250 MG capsule    FLORASTOR     Take 250 mg by mouth daily        senna-docusate 8.6-50 MG per tablet    SENOKOT-S;PERICOLACE    120 tablet    Take 2 tablets by mouth At Bedtime Hold if diarrhea occurs.    Constipation, chronic       spironolactone 25 MG tablet    ALDACTONE    30 tablet    Take 0.5 tablets (12.5 mg) by mouth daily    Heart failure with preserved ejection fraction, NYHA class I (H)       trimethoprim 100 MG tablet    TRIMPEX    45 tablet    Take 0.5 tablets (50 mg) by mouth daily    Recurrent UTI       VITAMIN C PO      Take 500 mg by mouth daily        ZYRTEC ALLERGY 10 MG tablet   Generic drug:  cetirizine      Take 10 mg by mouth At Bedtime        * Notice:  This list has 3 medication(s) that are the same as other medications prescribed for you. Read the directions carefully, and ask your doctor or other care provider to review them with you.

## 2018-11-12 NOTE — TELEPHONE ENCOUNTER
Called and informed pt of message below. Pt stated understanding. No further questions at this time.    Minna Armenta LPN

## 2018-11-12 NOTE — PROGRESS NOTES
Called Linda back to ensure she understood message left on Friday afternoon. She stated understanding and started Spironolactone half tab yesterday. States her weight is down a couple of lbs over weekend and her ankles are less swollen. She reports she's been watching her sodium intake closely and keeping it to around 1500 mg. In clinic Wednesday and we will check labs then. Patient verbalizes understanding and agrees with plan of care. Lizett Zapata RN

## 2018-11-13 NOTE — PROGRESS NOTES
Preliminary Device Interrogation Results.  Final physician signed paceart report to be scanned and attached.    Received page from patient this evening.  Patient reports that she has been feeling very SOB and tired all day.  Patient states that she is worried that she is back in AFib.  Requested that patient send a remote transmission.  Remote pacemaker transmission received and reviewed.  Device transmission sent per MD orders.  Patient has a Medtronic dual lead pacemaker.  Normal pacemaker function.  1 AT/AF episode recorded lasting 22 days in duration. Patient went into AFlutter on 10/22/18 @ 0440.  AF burden = 79.1%.  Presenting EGM = AFlutter with  @ 62 bpm.  Patient is taking Eliquis.  AP = 19.2%.   = 66.5%.  Estimated battery longevity to ZENIADA = 6.5 years.  Patient notified of interrogation results.  Patient states that she does not feel like she needs to go to the ER this evening. Patient instructed to call device RN with further questions or concerns.  Patient instructed to call 911 and go to the ER if her symptoms increase.  Will notify Dr. Carrasquillo and call patient with plan of care in the morning.  Patient verbalized understanding of instructions and plan.  Saloni Armenta NP and Dr. Carrasquillo notified.  Requested that Saloni Armenta NP call patient's son with plan.       Remote pacemaker transmission

## 2018-11-13 NOTE — TELEPHONE ENCOUNTER
Preliminary Device Interrogation Results.  Final physician signed paceart report to be scanned and attached.    Received page from patient this evening.  Patient reports that she has been feeling very SOB and tired all day.  Patient states that she is worried that she is back in AFib.  Requested that patient send a remote transmission.  Remote pacemaker transmission received and reviewed.  Device transmission sent per MD orders.  Patient has a Medtronic dual lead pacemaker.  Normal pacemaker function.  1 AT/AF episode recorded lasting 22 days in duration.  Patient went into AFlutter on 10/22/18 @ 0440.  AF burden = 79.1%.  Presenting EGM = AFlutter with  @ 62 bpm.  Patient is taking Eliquis.  AP = 19.2%.   = 66.5%.  Estimated battery longevity to ZENAIDA = 6.5 years.  Patient notified of interrogation results.  Patient states that she does not feel like she needs to go to the ER this evening. Patient instructed to call device RN with further questions or concerns.  Patient instructed to call 911 and go to the ER if her symptoms increase.  Will notify Dr. Carrasquillo and call patient with plan of care in the morning.  Patient verbalized understanding of instructions and plan.    Remote pacemaker transmission

## 2018-11-14 ENCOUNTER — TELEPHONE (OUTPATIENT)
Dept: CARDIOLOGY | Facility: CLINIC | Age: 83
End: 2018-11-14

## 2018-11-14 ENCOUNTER — CARE COORDINATION (OUTPATIENT)
Dept: CARDIOLOGY | Facility: CLINIC | Age: 83
End: 2018-11-14

## 2018-11-14 DIAGNOSIS — I50.30 HEART FAILURE WITH PRESERVED EJECTION FRACTION, NYHA CLASS I (H): ICD-10-CM

## 2018-11-14 RX ORDER — FUROSEMIDE 20 MG
40 TABLET ORAL 2 TIMES DAILY
Qty: 120 TABLET | Refills: 0 | Status: SHIPPED | OUTPATIENT
Start: 2018-11-14 | End: 2018-11-20

## 2018-11-14 NOTE — TELEPHONE ENCOUNTER
Routing to provider as it appears that has been a status change for patient to resume home care.     Carmenza Miller RN, BSN

## 2018-11-14 NOTE — TELEPHONE ENCOUNTER
[facility] Call Center    Phone Message    May a detailed message be left on voicemail: yes    Reason for Call: Other: Pt requesting call back from a nurse to discuss her 2lb weight gain, and her BP is 122/102. Pt needs to know what she should do     Action Taken: Message routed to:  [facility] ([facility] cardio clinic

## 2018-11-14 NOTE — PROGRESS NOTES
Called Linda after receiving notice from device patient back in afib and has weight gain of 2 lbs in last few days, 5-6 total since seeing [facility] in clinic a few weeks ago. Gave following orders per Rochelle:    Date: 11/14/2018    Time of Call: 10:34 AM     Diagnosis:  Heart failure     [ VORB ] Ordering provider:  ARTEM Lui  Order: Increase Lasix to 40 mg twice daily.     Order received by: Lizett Zapata RN      Follow-up/additional notes: communicated this to Linda and son Arjun who state understanding. Will send mychart message as well.

## 2018-11-14 NOTE — TELEPHONE ENCOUNTER
....Reason for Call: Request for an order or referral:    Order or referral being requested: HOME HEALTH ORDER/. RESUMPTION OF CARE    Date needed: as soon as possible    Has the patient been seen by the PCP for this problem? YES    Additional comments:     Phone number Patient can be reached at:  [phone]    Best Time:  ANYTIME    Can we leave a detailed message on this number?  YES    Call taken on 11/14/2018 at 9:58 AM by Leonarda Gonzalez

## 2018-11-14 NOTE — TELEPHONE ENCOUNTER
Patient called in a device check yesterday because she had been feeling increased dyspnea for the past day (since Sunday).  Her device check showed that she was in Afib for the past 21 days. I called patient son Arjun at [phone] per pt request and he stated that she had decreased her dose of Lasix on Saturday and that she also has had weight gain and increased BP today.  I explained to the son that it is unlikely that her symptoms are related to the Afib as she felt well until decreasing the lasix and because she had been in the rhythm for so long before without symptoms and that the increased Afib and symptoms are likely due to hypervolemia from her HFpEF. I see that a new message was routed to Shala Agrawal in the [facility] today to manage the diuretic so I told him that she or the nurse would be calling.  She does have EP follow up scheduled for 11/21.  Please let me know if I can be of anymore assistance.     Minna Gallagher

## 2018-11-15 ENCOUNTER — TELEPHONE (OUTPATIENT)
Dept: CARDIOLOGY | Facility: CLINIC | Age: 83
End: 2018-11-15

## 2018-11-15 NOTE — TELEPHONE ENCOUNTER
[facility] Call Center    Phone Message    May a detailed message be left on voicemail: yes    Reason for Call: Patient called and said Dr. Loza referred her to another Cardiologist.  She is requesting a call to discuss if the follow up visit with Dr. Loza is needed in February.  Please advise.  Thank you.     Action Taken: Message routed to:  [facility]: Cardiology p [id number]

## 2018-11-16 NOTE — TELEPHONE ENCOUNTER
Called and spoke to patient. Explained since she is following with [facility] and Dr Carrasquillo, she does not need a follow up with Dr Loza at this time. She understands, and appointment was cancelled.   BRANDI Berger

## 2018-11-17 ENCOUNTER — APPOINTMENT (OUTPATIENT)
Dept: GENERAL RADIOLOGY | Facility: CLINIC | Age: 83
End: 2018-11-17
Attending: FAMILY MEDICINE
Payer: MEDICARE

## 2018-11-17 ENCOUNTER — ALLIED HEALTH/NURSE VISIT (OUTPATIENT)
Dept: CARDIOLOGY | Facility: CLINIC | Age: 83
End: 2018-11-17
Attending: INTERNAL MEDICINE
Payer: MEDICARE

## 2018-11-17 ENCOUNTER — APPOINTMENT (OUTPATIENT)
Dept: CT IMAGING | Facility: CLINIC | Age: 83
End: 2018-11-17
Attending: FAMILY MEDICINE
Payer: MEDICARE

## 2018-11-17 ENCOUNTER — HOSPITAL ENCOUNTER (OUTPATIENT)
Facility: CLINIC | Age: 83
Setting detail: OBSERVATION
Discharge: HOME | End: 2018-11-18
Attending: FAMILY MEDICINE | Admitting: INTERNAL MEDICINE
Payer: MEDICARE

## 2018-11-17 ENCOUNTER — TELEPHONE (OUTPATIENT)
Dept: CARDIOLOGY | Facility: CLINIC | Age: 83
End: 2018-11-17

## 2018-11-17 DIAGNOSIS — R55 NEAR SYNCOPE: ICD-10-CM

## 2018-11-17 DIAGNOSIS — I48.91 ATRIAL FIBRILLATION WITH CONTROLLED VENTRICULAR RESPONSE (H): ICD-10-CM

## 2018-11-17 DIAGNOSIS — I49.5 SICK SINUS SYNDROME (H): Primary | ICD-10-CM

## 2018-11-17 DIAGNOSIS — Z79.01 LONG TERM (CURRENT) USE OF ANTICOAGULANTS: ICD-10-CM

## 2018-11-17 DIAGNOSIS — R53.1 WEAKNESS GENERALIZED: ICD-10-CM

## 2018-11-17 LAB
ABO + RH BLD: NORMAL
ABO + RH BLD: NORMAL
ALBUMIN SERPL-MCNC: 3.4 G/DL (ref 3.4–5)
ALBUMIN UR-MCNC: NEGATIVE MG/DL
ALP SERPL-CCNC: 60 U/L (ref 40–150)
ALT SERPL W P-5'-P-CCNC: 22 U/L (ref 0–50)
ANION GAP SERPL CALCULATED.3IONS-SCNC: 8 MMOL/L (ref 3–14)
APPEARANCE UR: ABNORMAL
APTT PPP: 28 SEC (ref 22–37)
AST SERPL W P-5'-P-CCNC: 24 U/L (ref 0–45)
BASOPHILS # BLD AUTO: 0 10E9/L (ref 0–0.2)
BASOPHILS NFR BLD AUTO: 0.3 %
BILIRUB SERPL-MCNC: 0.6 MG/DL (ref 0.2–1.3)
BILIRUB UR QL STRIP: NEGATIVE
BLD GP AB SCN SERPL QL: NORMAL
BLOOD BANK CMNT PATIENT-IMP: NORMAL
BUN SERPL-MCNC: 37 MG/DL (ref 7–30)
CALCIUM SERPL-MCNC: 9.3 MG/DL (ref 8.5–10.1)
CHLORIDE SERPL-SCNC: 98 MMOL/L (ref 94–109)
CO2 SERPL-SCNC: 28 MMOL/L (ref 20–32)
COLOR UR AUTO: ABNORMAL
CREAT SERPL-MCNC: 1.79 MG/DL (ref 0.52–1.04)
DIFFERENTIAL METHOD BLD: ABNORMAL
EOSINOPHIL # BLD AUTO: 0.1 10E9/L (ref 0–0.7)
EOSINOPHIL NFR BLD AUTO: 1.3 %
ERYTHROCYTE [DISTWIDTH] IN BLOOD BY AUTOMATED COUNT: 13.3 % (ref 10–15)
GFR SERPL CREATININE-BSD FRML MDRD: 27 ML/MIN/1.7M2
GLUCOSE SERPL-MCNC: 191 MG/DL (ref 70–99)
GLUCOSE UR STRIP-MCNC: NEGATIVE MG/DL
HCT VFR BLD AUTO: 38.6 % (ref 35–47)
HGB BLD-MCNC: 12.7 G/DL (ref 11.7–15.7)
HGB UR QL STRIP: NEGATIVE
IMM GRANULOCYTES # BLD: 0 10E9/L (ref 0–0.4)
IMM GRANULOCYTES NFR BLD: 0.2 %
INR PPP: 1.4 (ref 0.86–1.14)
INTERPRETATION ECG - MUSE: NORMAL
KETONES UR STRIP-MCNC: NEGATIVE MG/DL
LEUKOCYTE ESTERASE UR QL STRIP: ABNORMAL
LIPASE SERPL-CCNC: 116 U/L (ref 73–393)
LYMPHOCYTES # BLD AUTO: 1.2 10E9/L (ref 0.8–5.3)
LYMPHOCYTES NFR BLD AUTO: 18.5 %
MCH RBC QN AUTO: 33.9 PG (ref 26.5–33)
MCHC RBC AUTO-ENTMCNC: 32.9 G/DL (ref 31.5–36.5)
MCV RBC AUTO: 103 FL (ref 78–100)
MONOCYTES # BLD AUTO: 0.4 10E9/L (ref 0–1.3)
MONOCYTES NFR BLD AUTO: 6.6 %
MUCOUS THREADS #/AREA URNS LPF: PRESENT /LPF
NEUTROPHILS # BLD AUTO: 4.6 10E9/L (ref 1.6–8.3)
NEUTROPHILS NFR BLD AUTO: 73.1 %
NITRATE UR QL: NEGATIVE
NRBC # BLD AUTO: 0 10*3/UL
NRBC BLD AUTO-RTO: 0 /100
NT-PROBNP SERPL-MCNC: 1562 PG/ML (ref 0–1800)
PH UR STRIP: 7 PH (ref 5–7)
PLATELET # BLD AUTO: 255 10E9/L (ref 150–450)
POTASSIUM SERPL-SCNC: 4.4 MMOL/L (ref 3.4–5.3)
PROT SERPL-MCNC: 7.4 G/DL (ref 6.8–8.8)
RBC # BLD AUTO: 3.75 10E12/L (ref 3.8–5.2)
RBC #/AREA URNS AUTO: 3 /HPF (ref 0–2)
SODIUM SERPL-SCNC: 134 MMOL/L (ref 133–144)
SOURCE: ABNORMAL
SP GR UR STRIP: 1.01 (ref 1–1.03)
SPECIMEN EXP DATE BLD: NORMAL
SQUAMOUS #/AREA URNS AUTO: 3 /HPF (ref 0–1)
TROPONIN I SERPL-MCNC: <0.015 UG/L (ref 0–0.04)
TSH SERPL DL<=0.005 MIU/L-ACNC: 0.82 MU/L (ref 0.4–4)
UROBILINOGEN UR STRIP-MCNC: NORMAL MG/DL (ref 0–2)
WBC # BLD AUTO: 6.2 10E9/L (ref 4–11)
WBC #/AREA URNS AUTO: 2 /HPF (ref 0–5)

## 2018-11-17 PROCEDURE — 93296 REM INTERROG EVL PM/IDS: CPT | Mod: ZF

## 2018-11-17 PROCEDURE — 71045 X-RAY EXAM CHEST 1 VIEW: CPT

## 2018-11-17 PROCEDURE — 83690 ASSAY OF LIPASE: CPT | Performed by: FAMILY MEDICINE

## 2018-11-17 PROCEDURE — 70450 CT HEAD/BRAIN W/O DYE: CPT

## 2018-11-17 PROCEDURE — A9270 NON-COVERED ITEM OR SERVICE: HCPCS | Mod: GY | Performed by: INTERNAL MEDICINE

## 2018-11-17 PROCEDURE — 25000128 H RX IP 250 OP 636: Performed by: FAMILY MEDICINE

## 2018-11-17 PROCEDURE — 87088 URINE BACTERIA CULTURE: CPT | Performed by: INTERNAL MEDICINE

## 2018-11-17 PROCEDURE — 85025 COMPLETE CBC W/AUTO DIFF WBC: CPT | Performed by: FAMILY MEDICINE

## 2018-11-17 PROCEDURE — 84443 ASSAY THYROID STIM HORMONE: CPT | Performed by: FAMILY MEDICINE

## 2018-11-17 PROCEDURE — 96374 THER/PROPH/DIAG INJ IV PUSH: CPT | Performed by: FAMILY MEDICINE

## 2018-11-17 PROCEDURE — 81001 URINALYSIS AUTO W/SCOPE: CPT | Performed by: FAMILY MEDICINE

## 2018-11-17 PROCEDURE — 99285 EMERGENCY DEPT VISIT HI MDM: CPT | Mod: 25 | Performed by: FAMILY MEDICINE

## 2018-11-17 PROCEDURE — 25000131 ZZH RX MED GY IP 250 OP 636 PS 637: Mod: GY | Performed by: INTERNAL MEDICINE

## 2018-11-17 PROCEDURE — 74176 CT ABD & PELVIS W/O CONTRAST: CPT

## 2018-11-17 PROCEDURE — 85610 PROTHROMBIN TIME: CPT | Performed by: FAMILY MEDICINE

## 2018-11-17 PROCEDURE — 96361 HYDRATE IV INFUSION ADD-ON: CPT | Performed by: FAMILY MEDICINE

## 2018-11-17 PROCEDURE — G0378 HOSPITAL OBSERVATION PER HR: HCPCS

## 2018-11-17 PROCEDURE — 86901 BLOOD TYPING SEROLOGIC RH(D): CPT | Performed by: FAMILY MEDICINE

## 2018-11-17 PROCEDURE — 84484 ASSAY OF TROPONIN QUANT: CPT | Performed by: FAMILY MEDICINE

## 2018-11-17 PROCEDURE — 86900 BLOOD TYPING SEROLOGIC ABO: CPT | Performed by: FAMILY MEDICINE

## 2018-11-17 PROCEDURE — 86850 RBC ANTIBODY SCREEN: CPT | Performed by: FAMILY MEDICINE

## 2018-11-17 PROCEDURE — 87186 SC STD MICRODIL/AGAR DIL: CPT | Performed by: INTERNAL MEDICINE

## 2018-11-17 PROCEDURE — 80053 COMPREHEN METABOLIC PANEL: CPT | Performed by: FAMILY MEDICINE

## 2018-11-17 PROCEDURE — 93005 ELECTROCARDIOGRAM TRACING: CPT | Mod: XU | Performed by: FAMILY MEDICINE

## 2018-11-17 PROCEDURE — 87086 URINE CULTURE/COLONY COUNT: CPT | Performed by: INTERNAL MEDICINE

## 2018-11-17 PROCEDURE — 25000132 ZZH RX MED GY IP 250 OP 250 PS 637: Mod: GY | Performed by: INTERNAL MEDICINE

## 2018-11-17 PROCEDURE — 85730 THROMBOPLASTIN TIME PARTIAL: CPT | Performed by: FAMILY MEDICINE

## 2018-11-17 PROCEDURE — 93010 ELECTROCARDIOGRAM REPORT: CPT | Mod: Z6 | Performed by: FAMILY MEDICINE

## 2018-11-17 PROCEDURE — 83880 ASSAY OF NATRIURETIC PEPTIDE: CPT | Performed by: FAMILY MEDICINE

## 2018-11-17 RX ORDER — CETIRIZINE HYDROCHLORIDE 10 MG/1
10 TABLET ORAL AT BEDTIME
Status: DISCONTINUED | OUTPATIENT
Start: 2018-11-17 | End: 2018-11-18 | Stop reason: HOSPADM

## 2018-11-17 RX ORDER — CARVEDILOL 6.25 MG/1
6.25 TABLET ORAL 2 TIMES DAILY WITH MEALS
Status: DISCONTINUED | OUTPATIENT
Start: 2018-11-17 | End: 2018-11-18 | Stop reason: HOSPADM

## 2018-11-17 RX ORDER — ACETAMINOPHEN 325 MG/1
650 TABLET ORAL EVERY 4 HOURS PRN
Status: DISCONTINUED | OUTPATIENT
Start: 2018-11-17 | End: 2018-11-18 | Stop reason: HOSPADM

## 2018-11-17 RX ORDER — AMIODARONE HYDROCHLORIDE 200 MG/1
200 TABLET ORAL DAILY
Status: DISCONTINUED | OUTPATIENT
Start: 2018-11-17 | End: 2018-11-18 | Stop reason: HOSPADM

## 2018-11-17 RX ORDER — SPIRONOLACTONE 25 MG
12.5 TABLET ORAL DAILY
Status: CANCELLED | OUTPATIENT
Start: 2018-11-17

## 2018-11-17 RX ORDER — AMOXICILLIN 250 MG
2 CAPSULE ORAL AT BEDTIME
Status: CANCELLED | OUTPATIENT
Start: 2018-11-17

## 2018-11-17 RX ORDER — AZATHIOPRINE 50 MG/1
50 TABLET ORAL DAILY
Status: DISCONTINUED | OUTPATIENT
Start: 2018-11-17 | End: 2018-11-18 | Stop reason: HOSPADM

## 2018-11-17 RX ORDER — LIDOCAINE 40 MG/G
CREAM TOPICAL
Status: DISCONTINUED | OUTPATIENT
Start: 2018-11-17 | End: 2018-11-17

## 2018-11-17 RX ORDER — ONDANSETRON 2 MG/ML
4 INJECTION INTRAMUSCULAR; INTRAVENOUS EVERY 30 MIN PRN
Status: DISCONTINUED | OUTPATIENT
Start: 2018-11-17 | End: 2018-11-18 | Stop reason: HOSPADM

## 2018-11-17 RX ORDER — ALUMINA, MAGNESIA, AND SIMETHICONE 2400; 2400; 240 MG/30ML; MG/30ML; MG/30ML
30 SUSPENSION ORAL EVERY 4 HOURS PRN
Status: DISCONTINUED | OUTPATIENT
Start: 2018-11-17 | End: 2018-11-18 | Stop reason: HOSPADM

## 2018-11-17 RX ORDER — ACETAMINOPHEN 650 MG/1
650 SUPPOSITORY RECTAL EVERY 4 HOURS PRN
Status: DISCONTINUED | OUTPATIENT
Start: 2018-11-17 | End: 2018-11-18 | Stop reason: HOSPADM

## 2018-11-17 RX ORDER — LIDOCAINE 40 MG/G
CREAM TOPICAL
Status: DISCONTINUED | OUTPATIENT
Start: 2018-11-17 | End: 2018-11-18 | Stop reason: HOSPADM

## 2018-11-17 RX ORDER — ALBUTEROL SULFATE 0.83 MG/ML
2.5 SOLUTION RESPIRATORY (INHALATION) EVERY 6 HOURS PRN
Status: DISCONTINUED | OUTPATIENT
Start: 2018-11-17 | End: 2018-11-18 | Stop reason: HOSPADM

## 2018-11-17 RX ORDER — TRIMETHOPRIM 100 MG/1
50 TABLET ORAL DAILY
Status: DISCONTINUED | OUTPATIENT
Start: 2018-11-17 | End: 2018-11-18 | Stop reason: HOSPADM

## 2018-11-17 RX ORDER — LEVOTHYROXINE SODIUM 25 UG/1
75 TABLET ORAL DAILY
Status: DISCONTINUED | OUTPATIENT
Start: 2018-11-17 | End: 2018-11-18 | Stop reason: HOSPADM

## 2018-11-17 RX ADMIN — CARVEDILOL 6.25 MG: 6.25 TABLET, FILM COATED ORAL at 20:16

## 2018-11-17 RX ADMIN — AZATHIOPRINE 50 MG: 50 TABLET ORAL at 20:18

## 2018-11-17 RX ADMIN — CETIRIZINE HYDROCHLORIDE 10 MG: 10 TABLET, FILM COATED ORAL at 21:26

## 2018-11-17 RX ADMIN — TRIMETHOPRIM 50 MG: 100 TABLET ORAL at 21:22

## 2018-11-17 RX ADMIN — SODIUM CHLORIDE 500 ML: 9 INJECTION, SOLUTION INTRAVENOUS at 11:59

## 2018-11-17 RX ADMIN — ONDANSETRON HYDROCHLORIDE 4 MG: 2 INJECTION, SOLUTION INTRAMUSCULAR; INTRAVENOUS at 11:59

## 2018-11-17 RX ADMIN — APIXABAN 2.5 MG: 2.5 TABLET, FILM COATED ORAL at 20:17

## 2018-11-17 RX ADMIN — RANITIDINE 150 MG: 150 TABLET, FILM COATED ORAL at 20:13

## 2018-11-17 ASSESSMENT — ENCOUNTER SYMPTOMS
ABDOMINAL PAIN: 0
NECK PAIN: 0
ARTHRALGIAS: 0
APPETITE CHANGE: 0
DIFFICULTY URINATING: 0
LIGHT-HEADEDNESS: 1
BRUISES/BLEEDS EASILY: 1
ACTIVITY CHANGE: 1
DYSPHORIC MOOD: 1
HEADACHES: 0
WEAKNESS: 1
NUMBNESS: 0
WOUND: 0
SEIZURES: 0
DIZZINESS: 0
BLOOD IN STOOL: 0
NAUSEA: 1
DIARRHEA: 0
NECK STIFFNESS: 0
FEVER: 0
CONFUSION: 0
FATIGUE: 1
COLOR CHANGE: 0
SHORTNESS OF BREATH: 0
DYSURIA: 0
VOMITING: 0
CONSTIPATION: 0
TREMORS: 0
SLEEP DISTURBANCE: 0
DECREASED CONCENTRATION: 1
EYE REDNESS: 0
SPEECH DIFFICULTY: 0

## 2018-11-17 NOTE — MR AVS SNAPSHOT
After Visit Summary   11/17/2018    Linda Spicer    MRN: [id number]           Patient Information     Date Of Birth          6/13/1931        Visit Information        Provider Department      11/17/2018 6:00 AM UC ICD REMOTE [facility]        Today's Diagnoses     Sick sinus syndrome (H)    -  1       Follow-ups after your visit        Your next 10 appointments already scheduled     Nov 21, 2018  8:30 AM CST   Lab with UC LAB    [facility] ([facility])    [address]  1st Floor  [facility] [address]   [phone]            Nov 21, 2018  9:00 AM CST   FULL PULMONARY FUNCTION with UC PFL C   [facility] Pul[facility] Function Testing ([facility])    [address]  3rd Floor  [facility] [address]   [phone]            Nov 21, 2018 10:30 AM CST   (Arrive by 10:15 AM)   Pacemaker Check with Uc Cv Device 1   [facility] ([facility])    [address]  3rd Floor  [address]               Nov 21, 2018 11:30 AM CST   (Arrive by 11:15 AM)   RETURN ARRHYTHMIA with Franki Carrasquillo MD   [facility] ([facility])    [address] [place] [address]  [address]  [facility] [id number] [phone]            Nov 26, 2018  1:00 PM CST   Level 1 with BAY 8 INFUSION   [facility] ([facility]    [address] [place] [id number] [phone]            Dec 11, 2018 10:00 AM CST   Office Visit with PFT LAB   [facility] ([facility])    [address] [place] [id number] [phone]           Bring a current list of meds and any records pertaining to this visit. For Physicals, please bring immunization records and any forms needing to be filled out. Please arrive 10 minutes early to complete paperwork.            Dec 11, 2018 11:00 AM CST   Return Visit with Dea Acosta  MD   [facility] ([facility]    [address] [place] [address]   [phone]            Dec 14, 2018  9:45 AM CST   LAB with BK LAB   [facility] ([facility]    [address] [facility] [address]   [phone]           Please do not eat 10-12 hours before your appointment if you are coming in fasting for labs on lipids, cholesterol, or glucose (sugar). This does not apply to pregnant women. Water, hot tea and black coffee (with nothing added) are okay. Do not drink other fluids, diet soda or chew gum.            Dec 17, 2018  9:30 AM CST   Core Return with Karla Mabry NP   [facility] ([facility])    [address] [place] [address] 2nd Floor  [facility] [address]   [phone]            Dec 27, 2018  9:00 AM CST   Level 1 with [address] ([facility])    [address] [place] [address]   [phone]              Who to contact     If you have questions or need follow up information about today's clinic visit or your schedule please contact [facility] directly at [phone].  Normal or non-critical lab and imaging results will be communicated to you by MyChart, letter or phone within 4 business days after the clinic has received the results. If you do not hear from us within 7 days, please contact the clinic through [employer] or phone. If you have a critical or abnormal lab result, we will notify you by phone as soon as possible.  Submit refill requests through [employer] or call your pharmacy and they will forward the refill request to us. Please allow 3 business days for your refill to be completed.          Additional Information About Your Visit        [employer] Information     [employer] gives you secure access to your electronic health record. If you see a primary care  provider, you can also send messages to your care team and make appointments. If you have questions, please call your primary care clinic.  If you do not have a primary care provider, please call [phone] and they will assist you.        Care EveryWhere ID     This is your Care EveryWhere ID. This could be used by other organizations to access your [facility] medical records  [id number]        Your Vitals Were     Last Period                   (LMP Unknown)            Blood Pressure from Last 3 Encounters:   11/18/18 165/87   11/01/18 125/77   10/29/18 132/75    Weight from Last 3 Encounters:   11/18/18 71.1 kg (156 lb 12.8 oz)   11/01/18 73.1 kg (161 lb 1 oz)   10/29/18 72.6 kg (160 lb)              We Performed the Following     INTERROGATION DEVICE EVAL REMOTE, PACER/ICD        Primary Care Provider Office Phone # Fax #    Tre Lockett [phone]       [address] [facility] [address]        Goals        General    #1 I will complete my health care directive. (pt-stated)     Notes - Note edited  8/21/2018 10:39 AM by Behl, Melissa K, RN    Goal Statement: I will complete my health care directive.  Measure of Success: Health care directive will be completed and scanned into chart.  Supportive Steps to Achieve: Patient has attended a health care directive class, 10/24/17 informed of CPR vs intubation  Barriers: is awaiting to see her  to finalize document  Strengths: has care coordination, home care and excellent family support  Date to Achieve By: 12/31/18  Patient expressed understanding of goal: yes             #2 Healthy Eating (pt-stated)     Notes - Note created  10/30/2018  3:58 PM by Behl, Melissa K, RN    Goal Statement: I will follow a low sodium diet.  Measure of Success: Patient will consistently eat a low sodium diet.  Supportive Steps to Achieve: RN CC will mail out low sodium diet ideas.  Family providing assistance.  Barriers: not always able to cook for  self  Strengths: family support  Date to Achieve By: 12/30/18  Patient expressed understanding of goal: yes           Equal Access to Services     [facility] : Hadii aad ku hadmyrajason Goyal, bebetoda noeldanyha, leena bazzifidencio harrisryannefidencio, zahraa petit angelitacapri quezadairmaaddy skinner. So [facility] [phone].    ATENCIÓN: Si habla español, tiene a merchant disposición servicios gratuitos de asistencia lingüística. Llame al [phone].    We comply with applicable federal civil rights laws and [place] laws. We do not discriminate on the basis of race, color, national origin, age, disability, sex, sexual orientation, or gender identity.            Thank you!     Thank you for choosing [facility]  for your care. Our goal is always to provide you with excellent care. Hearing back from our patients is one way we can continue to improve our services. Please take a few minutes to complete the written survey that you may receive in the mail after your visit with us. Thank you!             Your Updated Medication List - Protect others around you: Learn how to safely use, store and throw away your medicines at www.disposemymeds.org.          This list is accurate as of 11/17/18 11:03 AM.  Always use your most recent med list.                   Brand Name Dispense Instructions for use Diagnosis    ACETAMINOPHEN PO      Take 1,000 mg by mouth 2 times daily as needed        albuterol (2.5 MG/3ML) 0.083% neb solution      Take 1 vial by nebulization every 6 hours as needed for shortness of breath / dyspnea or wheezing        amiodarone 200 MG tablet    PACERONE/CODARONE    120 tablet    1st week: 400mg twice a day. 2nd week: 200mg twice a day. Then 200mg daily    Paroxysmal atrial fibrillation (H)       apixaban ANTICOAGULANT 2.5 MG tablet    ELIQUIS    180 tablet    Take 1 tablet (2.5 mg) by mouth 2 times daily    Atrial flutter, paroxysmal (H)       azaTHIOprine 50 MG tablet    IMURAN    90 tablet    Take 1 tablet (50 mg) by  mouth daily    Rheumatoid arthritis involving multiple sites with positive rheumatoid factor (H)       Blood Pressure Monitor Kit     1 kit    1 kit daily as needed    CHF (congestive heart failure) (H)       calcium carbonate 600 mg-vitamin D 400 units 600-400 MG-UNIT per tablet    CALTRATE     Take 1 tablet by mouth 2 times daily        carvedilol 6.25 MG tablet    COREG    60 tablet    Take 1 tablet (6.25 mg) by mouth 2 times daily (with meals)    Heart failure with preserved ejection fraction, NYHA class I (H), Benign essential hypertension       COMPOUNDED NON-CONTROLLED SUBSTANCE - PHARMACY TO MIX COMPOUNDED MEDICATION    CMPD RX    30 g    Estriol 1mg/gram. Place 1 gram vaginally daily for two weeks, then vaginally twice weekly after.    Atrophic vaginitis       fish oil-omega-3 fatty acids 1000 MG capsule      Take 2 g by mouth daily. 2 capsules daily        folic acid 1 MG tablet    FOLVITE    90 tablet    Take 1 tablet (1 mg) by mouth daily    Oral aphthae       furosemide 20 MG tablet    LASIX    120 tablet    Take 2 tablets (40 mg) by mouth 2 times daily    Heart failure with preserved ejection fraction, NYHA class I (H)       GENTEAL MILD OP      Apply  to eye daily.        hydrocortisone 2.5 % cream     30 g    Apply BID to affected region(s) for 7-10 days.    Rash       levothyroxine 75 MCG tablet    SYNTHROID/LEVOTHROID    90 tablet    TAKE ONE TABLET BY MOUTH ONCE DAILY    Hypothyroidism, unspecified type       nitroGLYcerin 0.4 MG sublingual tablet    NITROSTAT    25 tablet    Place 1 tablet (0.4 mg) under the tongue every 5 minutes as needed for chest pain    Chest pain       * order for DME     1 Box    Equipment being ordered: Dispense face mask. Mrs. Spicer is immunosuppressed due to rheumatoid arthritis.    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 Use with Albuterol.    Hypoxia        order for DME     1 each    Equipment being ordered: Dispense baffle, for use with nebulizer.    Pneumonia of left upper lobe due to Mycoplasma pneumoniae       * order for DME     1 each    Dispense SP Walker-small    Pain of toe of right foot, Status post bunionectomy       ORENCIA IV      Inject into the vein every 28 days        PRESERVISION AREDS PO      Take 1 tablet by mouth daily        ranibizumab 0.3 MG/0.05ML Soln    LUCENTIS     0.3 mg by Intravitreal route Every 6 weeks        ranitidine 150 MG tablet    ZANTAC    60 tablet    Take 1 tablet (150 mg) by mouth 2 times daily    Gastroesophageal reflux disease without esophagitis       REFRESH P.M. Oint      Apply  to eye. Daily at bedtime        saccharomyces boulardii 250 MG capsule    FLORASTOR     Take 250 mg by mouth daily        senna-docusate 8.6-50 MG per tablet    SENOKOT-S;PERICOLACE    120 tablet    Take 2 tablets by mouth At Bedtime Hold if diarrhea occurs.    Constipation, chronic       spironolactone 25 MG tablet    ALDACTONE    30 tablet    Take 0.5 tablets (12.5 mg) by mouth daily    Heart failure with preserved ejection fraction, NYHA class I (H)       trimethoprim 100 MG tablet    TRIMPEX    45 tablet    Take 0.5 tablets (50 mg) by mouth daily    Recurrent UTI       VITAMIN C PO      Take 500 mg by mouth daily        ZYRTEC ALLERGY 10 MG tablet   Generic drug:  cetirizine      Take 10 mg by mouth At Bedtime        * Notice:  This list has 3 medication(s) that are the same as other medications prescribed for you. Read the directions carefully, and ask your doctor or other care provider to review them with you.

## 2018-11-17 NOTE — ED TRIAGE NOTES
Pt BIBA for dizziness and weakness that started this morning. Been in A-fib for 2-4 weeks. Has a pacemaker. . /80, 99% SpO2 on RA. 18 joyce in L AC.

## 2018-11-17 NOTE — IP AVS SNAPSHOT
Unit 6D Observation [address] [address]    Phone:  [phone]    Fax:  [phone]                                       After Visit Summary   11/17/2018    Linda Spicer    MRN: [id number]           After Visit Summary Signature Page     I have received my discharge instructions, and my questions have been answered. I have discussed any challenges I see with this plan with the nurse or doctor.    ..........................................................................................................................................  Patient/Patient Representative Signature      ..........................................................................................................................................  Patient Representative Print Name and Relationship to Patient    ..................................................               ................................................  Date                                   Time    ..........................................................................................................................................  Reviewed by Signature/Title    ...................................................              ..............................................  Date                                               Time          22EPIC Rev 08/18

## 2018-11-17 NOTE — ED NOTES
[facility] [place]   ED Nurse to Floor Handoff     Linda Spicer is a 87 year old female who speaks English and lives alone,  in a home  They arrived in the ED by ambulance from home    ED Chief Complaint: Dizziness and Generalized Weakness    ED Dx;   Final diagnoses:   Atrial fibrillation with controlled ventricular response (H)         Needed?: No    Allergies:   Allergies   Allergen Reactions     Cephalexin Hcl Diarrhea     Gabapentin Other (See Comments)     Dizzsiness     Naproxen GI Disturbance     Perfume      Macrobid [Nitrofurantoin Anhydrous]      Possibly related to lung disease      Seasonal Allergies      Sulfa Drugs      Throat swelling     Xanax [Alprazolam] Other (See Comments)     Dizziness      Ciprofloxacin Itching and Rash   .  Past Medical Hx:   Past Medical History:   Diagnosis Date     Adjustment disorder with anxious mood 5/18/2015     Advanced directives, counseling/discussion 8/30/2012    Patient states has Advance Directive and will bring in a copy to clinic. 8/30/2012   [facility] Medical Assistant \       Anemia 9/25/2015     Basal cell cancer      CHF (congestive heart failure) (H) 9/18/2014     CKD (chronic kidney disease) stage 3, GFR 30-59 ml/min (H) 9/29/2015     DDD (degenerative disc disease), lumbar 9/25/2015     Diffuse idiopathic pulmonary fibrosis (H) 5/6/2013     Encounter for palliative care 5/18/2015     History of blood transfusion 9/29/2015     Hypertension goal BP (blood pressure) < 140/90 9/7/2012     Hypothyroid 9/7/2012     Irritable bowel syndrome 10/29/2013     Macular degeneration      Macular degeneration, 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     Nondisplaced spiral fracture of shaft of humerus      Osteoporosis 8/13/2013     Imo Update utility     RA (rheumatoid arthritis) (H) 5/7/2013     Rheumatic fever      Shingles      Spinal stenosis of lumbar region with neurogenic claudication 9/14/2015      Baseline Mental status:  [facility]  Current Mental Status changes: at basesline    Infection present or suspected this encounter: no  Sepsis suspected: No  Isolation type: No active isolations     Activity level - Baseline/Home:  Stand with Assist  Activity Level - Current:   Stand with Assist    Bariatric equipment needed?: No    In the ED these meds were given:   Medications   lidocaine 1 % 1 mL (not administered)   lidocaine (LMX4) cream (not administered)   sodium chloride (PF) 0.9% PF flush 3 mL (not administered)   sodium chloride (PF) 0.9% PF flush 3 mL (not administered)   ondansetron (ZOFRAN) injection 4 mg (4 mg Intravenous Given 11/17/18 1159)   0.9% sodium chloride BOLUS (0 mLs Intravenous Stopped 11/17/18 1329)       Drips running?  No    Home pump  No    Current LDAs  Peripheral IV 11/17/18 Left Lower forearm (Active)   Site Assessment [facility] 11/17/2018 11:10 AM   Number of days:0       Incision/Surgical Site 10/13/15 Midline;Posterior Back (Active)   Number of days:1131       Labs results:   Labs Ordered and Resulted from Time of ED Arrival Up to the Time of Departure from the ED   CBC WITH PLATELETS DIFFERENTIAL - Abnormal; Notable for the following:        Result Value    RBC Count 3.75 (*)      (*)     MCH 33.9 (*)     All other components within normal limits   INR - Abnormal; Notable for the following:     INR 1.40 (*)     All other components within normal limits   COMPREHENSIVE METABOLIC PANEL - Abnormal; Notable for the following:     Glucose 191 (*)     Urea Nitrogen 37 (*)     Creatinine 1.79 (*)     GFR Estimate 27 (*)     GFR Estimate If Black 32 (*)     All other components within normal limits   ROUTINE UA WITH MICROSCOPIC REFLEX TO CULTURE - Abnormal; Notable for the following:     Leukocyte Esterase Urine Moderate (*)     RBC Urine 3 (*)     Squamous Epithelial /HPF Urine 3 (*)     Mucous Urine Present (*)     All other components within normal limits   PARTIAL THROMBOPLASTIN TIME   LIPASE   TROPONIN I   NT  PROBNP INPATIENT   CARDIAC CONTINUOUS MONITORING   PULSE OXIMETRY NURSING   PERIPHERAL IV CATHETER   ABO/RH TYPE AND SCREEN       Imaging Studies:   Recent Results (from the past 24 hour(s))   CT Abdomen Pelvis w/o Contrast    Narrative    EXAMINATION: CT ABDOMEN PELVIS W/O CONTRAST, 11/17/2018 11:47 AM    TECHNIQUE:  Helical CT images from the lung bases through the  symphysis pubis were obtained without IV contrast. Contrast dose: None    COMPARISON: SPECT scan 8/30/2018, CT pelvis 7/19/2018, outside CT  9/11/2017    HISTORY: abd pain and light headedness;     FINDINGS:    Abdomen and pelvis:   Unremarkable noncontrast appearance of the liver. Multiple prominent  cholelithiasis. No convincing evidence of cholecystitis, no adjacent  inflammatory thickening or cholecystic fluid. Spleen is within normal  limits. 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 Adrenal glands are unremarkable. Cystic pancreatic  lesion measuring 2.6 x 1.7 cm (series 3, image 100), not significantly  changed from 9/11/2017, not seen on 8/16/2016, additional smaller  cystic pancreatic foci. Partial fatty atrophy of the pancreas.  Bilateral renal cortical atrophy, not significantly changed. No  significant hydronephrosis or hydroureter. Small amount of gas within  the bladder. No significant bladder wall thickening. Mild prominent  fat of the left inguinal canal. Postoperative changes of hysterectomy.    No dilated loops bowel. Moderate colonic stool. Colonic diverticulosis  without evidence of diverticulitis.    The abdominal aorta is within normal limits with moderate calcified  atherosclerotic plaque.    Lung bases: Basilar predominant interstitial lung changes with  subpleural honeycombing mild peripheral traction bronchiectasis and  peribronchovascular consolidation. No evidence of acute airspace  disease. No significant pleural effusion. The heart is enlarged with  partially visualized cardiac device  leads in appropriate positions.  Biatrial enlargement. Mild cardiac annular calcifications, coronary  artery calcifications.    Bones and soft tissues: Chronic left L4 pars defects. Degenerative L4  on L5 grade 1 anterolisthesis. Subacute left sacral alar and  transverse S2-S3 transverse sacral fractures.       Impression    IMPRESSION:   1. No acute findings of the abdomen/pelvis to explain patient's  symptoms.  2. Cholelithiasis without evidence of cholecystitis.  3. Diverticulosis without evidence of diverticulitis.  4. Trace amount of gas within the bladder, correlate for recent  catheterization or evidence of cystitis.  5. Basilar interstitial lung disease.  6. Cystic pancreatic lesion, stable from 9/1/2017, not seen in 2016.  Differential includes cystic pancreatic neoplasm, IPMN, or sequela  pancreatitis. Consider follow-up MRI with contrast.    Consider Follow Up: Pancreatic cyst]    This report will be copied to the [facility] to ensure a  provider acknowledges the finding.     I have personally reviewed the examination and initial interpretation  and I agree with the findings.    JOSEPH WHITE MD   CT Head w/o Contrast    Narrative    CT HEAD W/O CONTRAST 11/17/2018 11:48 AM    Provided History: weakness on anticoagulant;   ICD-10:    Comparison: CT sinuses 9/18/2012.    Technique: Using multidetector thin collimation helical acquisition  technique, axial, coronal and sagittal CT images from the skull base  to the vertex were obtained without intravenous contrast.     Findings:    No intracranial hemorrhage, mass effect, or midline shift. The  ventricles are proportionate to the cerebral sulci. Mild diffuse  cerebral volume loss. Scattered subcortical and periventricular white  matter hypoattenuating foci. Calcified pineal gland. The gray to white  matter differentiation of the cerebral hemispheres is preserved. The  basal cisterns are patent. Calcifications of the bilateral carotid  siphons.  Bilateral pseudophakia.    The visualized paranasal sinuses are clear. The mastoid air cells are  clear.       Impression    Impression:   1. No acute intracranial pathology.   2. Mild cerebral volume loss and chronic small vessel ischemic  changes.    I have personally reviewed the examination and initial interpretation  and I agree with the findings.    SIMIN DAVID MD   XR Chest Port 1 View    Narrative    XR CHEST PORT 1 VW 11/17/2018 12:24 PM    Comparison: Chest radiograph 8/30/2017    History: weakness near syncope;     Findings: Single AP view of the chest. Left chest wall cardiac device  leads in stable position. Cardiac silhouette is within normal limits,  unchanged. Basilar predominant interstitial opacities, similar to  mildly increased compared to the prior exam. No pleural effusion. No  pneumothorax. No acute airspace disease. Upper abdomen is  unremarkable.      Impression    Impression:   Mildly increased basilar reticular opacities from 8/30/2017, likely  atelectasis versus progression of interstitial lung disease.    I have personally reviewed the examination and initial interpretation  and I agree with the findings.    JOSEPH WHITE MD       Recent vital signs:   /55  Pulse 62  Temp 97.8  F (36.6  C) (Oral)  Resp 18  LMP  (LMP Unknown)  SpO2 100%    Cardiac Rhythm: A-fib, paced  Pt needs tele? Yes  Skin/wound Issues: None    Code Status: Full Code    Pain control: pt had none    Nausea control: pt had none    Abnormal labs/tests/findings requiring intervention: NA    Family present during ED course? Yes   Family Comments/Social Situation comments: NA    Tasks needing completion: None    Samira Alcantara RN    [phone] [facility] ED

## 2018-11-17 NOTE — IP AVS SNAPSHOT
MRN:[id number]                      After Visit Summary   11/17/2018    Linda Spicer    MRN: [id number]           Thank you!     Thank you for choosing [facility] for your care. Our goal is always to provide you with excellent care. Hearing back from our patients is one way we can continue to improve our services. Please take a few minutes to complete the written survey that you may receive in the mail after you visit with us. Thank you!        Patient Information     Date Of Birth          6/13/1931        About your hospital stay     You were admitted on:  November 17, 2018 You last received care in the:  Unit 6D Observation [facility]    You were discharged on:  November 18, 2018        Reason for your hospital stay       Vagal versus Orthostatic Pre Syncope                  Who to Call     For medical emergencies, please call 911.  For non-urgent questions about your medical care, please call your primary care provider or clinic, [phone]          Attending Provider     Provider Specialty    Ángel Stephen MD Emergency Medicine    Franki Carrasquillo MD Clinical Cardiac Electrophysiology       Primary Care Provider Office Phone # Fax #    Tre Lockett [phone]      After Care Instructions     Activity       Your activity upon discharge: activity as tolerated            Diet       Follow this diet upon discharge: 2g Na, 2L fluid restriction            Discharge Instructions       1. Continue to check your weights daily  2. Hold spironolactone for the next few days - monitor your weights. If you find you are gaining weight too rapidly, take it.  3. Touch base with [facility] clinic this week   4 You will have follow up with Dr. Carrasquillo in a month - your Wednesday appointments will be rescheduled                  Follow-up Appointments     Adult [facility] Follow-up and recommended labs and tests       Follow up with Dr. Carrasquillo in a month - all your things scheduled for  Monday will happen them  Follow up with [facility] clinic (call in)     Appointments on [facility] and/or [facility] (with [facility] or [employer] provider or service). Call [phone] if you haven't heard regarding these appointments within 7 days of discharge.                  Your next 10 appointments already scheduled     Nov 21, 2018  8:30 AM CST   Lab with UC LAB    [facility] ([facility])    [address]  1st Floor  [facility] [address]   [phone]            Nov 21, 2018  9:00 AM CST   FULL PULMONARY FUNCTION with  PFL C   [facility] Pulmonary Function Testing ([facility])    [address]  3rd Floor  [facility] [id number] [phone]            Nov 21, 2018 10:30 AM CST   (Arrive by 10:15 AM)   Pacemaker Check with  Cv Device 1   [facility] Heart Care ([facility])    [address]  3rd Floor  [address]               Nov 21, 2018 11:30 AM CST   (Arrive by 11:15 AM)   RETURN ARRHYTHMIA with Franki Carrasquillo MD   [facility] ([facility])    [address] [place] [address]  [address]  [facility] [address] [phone]            Nov 26, 2018  1:00 PM CST   Level 1 with BAY 8 INFUSION   [facility]    [address] [place] [id number] [phone]            Dec 11, 2018 10:00 AM CST   Office Visit with PFT LAB   [facility])    [address] [place] [id number] [phone]           Bring a current list of meds and any records pertaining to this visit. For Physicals, please bring immunization records and any forms needing to be filled out. Please arrive 10 minutes early to complete paperwork.            Dec 11, 2018 11:00 AM CST   [facility] Visit with De[facility] Acosta MD   [facility]    [address] [place] [address]  [facility] [address]   [phone]            Dec 14, 2018  9:45 AM CST   LAB with BK LAB   [facility]    [address] [address]   [phone]           Please do not eat 10-12 hours before your appointment if you are coming in fasting for labs on lipids, cholesterol, or glucose (sugar). This does not apply to pregnant women. Water, hot tea and black coffee (with nothing added) are okay. Do not drink other fluids, diet soda or chew gum.            Dec 17, 2018  9:30 AM CST   Core Return with Karla Mabry NP   [facility]YS[facility] AT [facility] ([facility])    [address] [place] [address] 2nd Floor  [facility] [address]   [phone]            Dec 27, 2018  9:00 AM CST   Level 1 with [address] ([facility])    [address] [address] [place] [address]   [phone]              Pending Results     Date and Time Order Name Status Description    11/17/2018 1124 Urine Culture Aerobic Bacterial Preliminary             Statement of Approval     Ordered          11/18/18 1122  I have reviewed and agree with all the recommendations and orders detailed in this document.  EFFECTIVE NOW     Approved and electronically signed by:  Ashley Justin MD             Admission Information     Date & Time Provider Department Dept. Phone    11/17/2018 Franki Carrasquillo MD Unit 6D Observation [facility] [phone]      Your Vitals Were     Blood Pressure Pulse Temperature Respirations Weight Last Period    165/87 (BP Location: Right arm) 62 98.6  F (37  C) (Oral) 14 71.1 kg (156 lb 12.8 oz) (LMP Unknown)    Pulse Oximetry BMI (Body Mass Index)                97% 26.91 kg/m2          [employer] Information     [employer] gives you secure access to your electronic health record. If you see a primary care provider, you can also send messages to your care team  and make appointments. If you have questions, please call your primary care clinic.  If you do not have a primary care provider, please call [phone] and they will assist you.        Care EveryWhere ID     This is your Care EveryWhere ID. This could be used by other organizations to access your [facility] medical records  [id number]        Equal Access to Services     [facility] : Hadii rakesh schuster hadmyrao Sojasonali, waaxda luqadaha, qaybta kaalmada ana, zahraa quezadairmaaddy moss . So [facility] [phone].    ATENCIÓN: Si habla español, tiene a merchant disposición servicios gratuitos de asistencia lingüística. Alin al [phone].    We comply with applicable federal civil rights laws and [place] laws. We do not discriminate on the basis of race, color, national origin, age, disability, sex, sexual orientation, or gender identity.               Review of your medicines      CONTINUE these medicines which have NOT CHANGED        Dose / Directions    ACETAMINOPHEN PO        Dose:  1000 mg   Take 1,000 mg by mouth 2 times daily as needed   Refills:  0       albuterol (2.5 MG/3ML) 0.083% neb solution        Dose:  1 vial   Take 1 vial by nebulization every 6 hours as needed for shortness of breath / dyspnea or wheezing   Refills:  0       amiodarone 200 MG tablet   Commonly known as:  PACERONE/CODARONE   Used for:  Paroxysmal atrial fibrillation (H)        1st week: 400mg twice a day. 2nd week: 200mg twice a day. Then 200mg daily   Quantity:  120 tablet   Refills:  3       apixaban ANTICOAGULANT 2.5 MG tablet   Commonly known as:  ELIQUIS   Used for:  Atrial flutter, paroxysmal (H)        Dose:  2.5 mg   Take 1 tablet (2.5 mg) by mouth 2 times daily   Quantity:  180 tablet   Refills:  3       azaTHIOprine 50 MG tablet   Commonly known as:  IMURAN   Used for:  Rheumatoid arthritis involving multiple sites with positive rheumatoid factor (H)        Dose:  50 mg   Take 1 tablet (50 mg) by mouth daily    Quantity:  90 tablet   Refills:  2       Blood Pressure Monitor Kit   Used for:  CHF (congestive heart failure) (H)        Dose:  1 kit   1 kit daily as needed   Quantity:  1 kit   Refills:  0       calcium carbonate 600 mg-vitamin D 400 units 600-400 MG-UNIT per tablet   Commonly known as:  CALTRATE        Dose:  1 tablet   Take 1 tablet by mouth 2 times daily   Refills:  0       carvedilol 6.25 MG tablet   Commonly known as:  COREG   Used for:  Heart failure with preserved ejection fraction, NYHA class I (H), Benign essential hypertension        Dose:  6.25 mg   Take 1 tablet (6.25 mg) by mouth 2 times daily (with meals)   Quantity:  60 tablet   Refills:  1       COMPOUNDED NON-CONTROLLED SUBSTANCE - PHARMACY TO MIX COMPOUNDED MEDICATION   Commonly known as:  CMPD RX   Used for:  Atrophic vaginitis        Estriol 1mg/gram. Place 1 gram vaginally daily for two weeks, then vaginally twice weekly after.   Quantity:  30 g   Refills:  6       fish oil-omega-3 fatty acids 1000 MG capsule        Dose:  2 g   Take 2 g by mouth daily. 2 capsules daily   Refills:  0       folic acid 1 MG tablet   Commonly known as:  FOLVITE   Used for:  Oral aphthae        Dose:  1 mg   Take 1 tablet (1 mg) by mouth daily   Quantity:  90 tablet   Refills:  3       furosemide 20 MG tablet   Commonly known as:  LASIX   Used for:  Heart failure with preserved ejection fraction, NYHA class I (H)        Dose:  40 mg   Take 2 tablets (40 mg) by mouth 2 times daily   Quantity:  120 tablet   Refills:  0       GENTEAL MILD OP        Apply  to eye daily.   Refills:  0       hydrocortisone 2.5 % cream   Used for:  Rash        Apply BID to affected region(s) for 7-10 days.   Quantity:  30 g   Refills:  0       levothyroxine 75 MCG tablet   Commonly known as:  SYNTHROID/LEVOTHROID   Used for:  Hypothyroidism, unspecified type        TAKE ONE TABLET BY MOUTH ONCE DAILY   Quantity:  90 tablet   Refills:  1       nitroGLYcerin 0.4 MG sublingual tablet    Commonly known as:  NITROSTAT   Used for:  Chest pain        Dose:  0.4 mg   Place 1 tablet (0.4 mg) under the tongue every 5 minutes as needed for chest pain   Quantity:  25 tablet   Refills:  0       * order for DME   Used for:  Rheumatoid arthritis involving left wrist with positive rheumatoid factor (H)        Equipment being ordered: Dispense face mask. Mrs. Spicer is immunosuppressed due to rheumatoid arthritis.   Quantity:  1 Box   Refills:  11       * order for DME   Used for:  Hypoxia        Equipment being ordered: Nebulizer. Use with Albuterol.   Quantity:  1 each   Refills:  0       order for DME   Used for:  Pneumonia of left upper lobe due to Mycoplasma pneumoniae        Equipment being ordered: Dispense baffle, for use with nebulizer.   Quantity:  1 each   Refills:  0       * order for DME   Used for:  Pain of toe of right foot, Status post bunionectomy        Dispense SP Walker-small   Quantity:  1 each   Refills:  0       ORENCIA IV        Inject into the vein every 28 days   Refills:  0       PRESERVISION AREDS PO        Dose:  1 tablet   Take 1 tablet by mouth daily   Refills:  0       ranibizumab 0.3 MG/0.05ML Soln   Commonly known as:  LUCENTIS        Dose:  0.3 mg   0.3 mg by Intravitreal route Every 6 weeks   Refills:  0       ranitidine 150 MG tablet   Commonly known as:  ZANTAC   Used for:  Gastroesophageal reflux disease without esophagitis        Dose:  150 mg   Take 1 tablet (150 mg) by mouth 2 times daily   Quantity:  60 tablet   Refills:  5       REFRESH P.M. Oint        Apply  to eye. Daily at bedtime   Refills:  0       saccharomyces boulardii 250 MG capsule   Commonly known as:  FLORASTOR        Dose:  250 mg   Take 250 mg by mouth daily   Refills:  0       senna-docusate 8.6-50 MG per tablet   Commonly known as:  SENOKOT-S;PERICOLACE   Used for:  Constipation, chronic        Dose:  2 tablet   Take 2 tablets by mouth At Bedtime Hold if diarrhea occurs.   Quantity:  120 tablet    Refills:  11       spironolactone 25 MG tablet   Commonly known as:  ALDACTONE   Used for:  Heart failure with preserved ejection fraction, NYHA class I (H)        Dose:  12.5 mg   Take 0.5 tablets (12.5 mg) by mouth daily   Quantity:  30 tablet   Refills:  1       trimethoprim 100 MG tablet   Commonly known as:  TRIMPEX   Used for:  Recurrent UTI        Dose:  50 mg   Take 0.5 tablets (50 mg) by mouth daily   Quantity:  45 tablet   Refills:  1       VITAMIN C PO        Dose:  500 mg   Take 500 mg by mouth daily   Refills:  0       ZYRTEC ALLERGY 10 MG tablet   Generic drug:  cetirizine        Dose:  10 mg   Take 10 mg by mouth At Bedtime   Refills:  0       * Notice:  This list has 3 medication(s) that are the same as other medications prescribed for you. Read the directions carefully, and ask your doctor or other care provider to review them with you.             Protect others around you: Learn how to safely use, store and throw away your medicines at www.disposemymeds.org.             Medication List: This is a list of all your medications and when to take them. Check marks below indicate your daily home schedule. Keep this list as a reference.      Medications           Morning Afternoon Evening Bedtime As Needed    ACETAMINOPHEN PO   Take 1,000 mg by mouth 2 times daily as needed                                albuterol (2.5 MG/3ML) 0.083% neb solution   Take 1 vial by nebulization every 6 hours as needed for shortness of breath / dyspnea or wheezing                                amiodarone 200 MG tablet   Commonly known as:  PACERONE/CODARONE   1st week: 400mg twice a day. 2nd week: 200mg twice a day. Then 200mg daily   Last time this was given:  200 mg on 11/18/2018  7:38 AM                                apixaban ANTICOAGULANT 2.5 MG tablet   Commonly known as:  ELIQUIS   Take 1 tablet (2.5 mg) by mouth 2 times daily   Last time this was given:  2.5 mg on 11/18/2018  7:40 AM                                 azaTHIOprine 50 MG tablet   Commonly known as:  IMURAN   Take 1 tablet (50 mg) by mouth daily   Last time this was given:  50 mg on 11/18/2018  7:39 AM                                Blood Pressure Monitor Kit   1 kit daily as needed                                calcium carbonate 600 mg-vitamin D 400 units 600-400 MG-UNIT per tablet   Commonly known as:  CALTRATE   Take 1 tablet by mouth 2 times daily                                carvedilol 6.25 MG tablet   Commonly known as:  COREG   Take 1 tablet (6.25 mg) by mouth 2 times daily (with meals)   Last time this was given:  6.25 mg on 11/18/2018  7:38 AM                                COMPOUNDED NON-CONTROLLED SUBSTANCE - PHARMACY TO MIX COMPOUNDED MEDICATION   Commonly known as:  CMPD RX   Estriol 1mg/gram. Place 1 gram vaginally daily for two weeks, then vaginally twice weekly after.                                fish oil-omega-3 fatty acids 1000 MG capsule   Take 2 g by mouth daily. 2 capsules daily                                folic acid 1 MG tablet   Commonly known as:  FOLVITE   Take 1 tablet (1 mg) by mouth daily                                furosemide 20 MG tablet   Commonly known as:  LASIX   Take 2 tablets (40 mg) by mouth 2 times daily   Last time this was given:  40 mg on 11/18/2018  7:38 AM                                GENTEAL MILD OP   Apply  to eye daily.                                hydrocortisone 2.5 % cream   Apply BID to affected region(s) for 7-10 days.                                levothyroxine 75 MCG tablet   Commonly known as:  SYNTHROID/LEVOTHROID   TAKE ONE TABLET BY MOUTH ONCE DAILY   Last time this was given:  75 mcg on 11/18/2018  7:38 AM                                nitroGLYcerin 0.4 MG sublingual tablet   Commonly known as:  NITROSTAT   Place 1 tablet (0.4 mg) under the tongue every 5 minutes as needed for chest pain                                * order for DME   Equipment being ordered: Dispense face mask. Mrs.  Dannielle is immunosuppressed due to rheumatoid arthritis.                                * order for DME   Equipment being ordered: Nebulizer. Use with Albuterol.                                order for DME   Equipment being ordered: Dispense baffle, for use with nebulizer.                                * order for DME   Dispense SP Walker-small                                ORENCIA IV   Inject into the vein every 28 days                                PRESERVISION AREDS PO   Take 1 tablet by mouth daily                                ranibizumab 0.3 MG/0.05ML Soln   Commonly known as:  LUCENTIS   0.3 mg by Intravitreal route Every 6 weeks                                ranitidine 150 MG tablet   Commonly known as:  ZANTAC   Take 1 tablet (150 mg) by mouth 2 times daily   Last time this was given:  150 mg on 11/18/2018  7:38 AM                                REFRESH P.M. Oint   Apply  to eye. Daily at bedtime                                saccharomyces boulardii 250 MG capsule   Commonly known as:  FLORASTOR   Take 250 mg by mouth daily                                senna-docusate 8.6-50 MG per tablet   Commonly known as:  SENOKOT-S;PERICOLACE   Take 2 tablets by mouth At Bedtime Hold if diarrhea occurs.                                spironolactone 25 MG tablet   Commonly known as:  ALDACTONE   Take 0.5 tablets (12.5 mg) by mouth daily                                trimethoprim 100 MG tablet   Commonly known as:  TRIMPEX   Take 0.5 tablets (50 mg) by mouth daily   Last time this was given:  50 mg on 11/18/2018  7:39 AM                                VITAMIN C PO   Take 500 mg by mouth daily                                ZYRTEC ALLERGY 10 MG tablet   Take 10 mg by mouth At Bedtime   Last time this was given:  10 mg on 11/17/2018  9:26 PM   Generic drug:  cetirizine                                * Notice:  This list has 3 medication(s) that are the same as other medications prescribed for you. Read the  directions carefully, and ask your doctor or other care provider to review them with you.

## 2018-11-17 NOTE — PROGRESS NOTES
Social Work: Assessment with Discharge Plan    Patient Name:  Rg Walter  :  1931  Age:  87 year old  MRN:  [id number]  Risk/Complexity Score:     Completed assessment with:  Patient and family (son and 2 daughters)    Presenting Information   Reason for Referral:  Potential need for community services upon discharge  Date of Intake:  2018  Referral Source:  Chart Review  Decision Maker:  patient  Alternate Decision Maker:  Not documented  Health Care Directive:  Not documented   Living Situation:  Apartment  Previous Functional Status:  Independent  Patient and family understanding of hospitalization: patient in [facility]  Cultural/Language/Spiritual Considerations:  Not assessed  Adjustment to Illness:  appropriate    Physical Health  Reason for Admission:  No diagnosis found.  Services Needed/Recommended:  Home with no services    Mental Health/Chemical Dependency  Diagnosis:  None documented  Support/Services in Place:  none  Services Needed/Recommended:  none    Support System  Significant relationship at present time:  Family (Son Arjun and daughter Xi)  Family of origin is available for support:  yes  Other support available:  Has had Home Care through [facility] in the past (ph:[phone]), but none currently.  Lives at [facility] in [place] ([facility])  Has meals available.     Gaps in support system:  None identified  Patient is caregiver to:  None     Provider Information   Primary Care Physician:  Tre Lockett   [phone]   Clinic:  [address] / [facility] [address]      :      Financial   Income Source:    Financial Concerns:  None identified  Insurance:    Payor/Plan Subscriber Name Rel Member # Group #   MEDICARE - MEDICARE [place]  [id number]       ATTN CLAIMS, [address]   BCBS - BCBS OF [place]  [id number]      [address] [address]       Discharge Plan   Patient and family discharge goal:  Home; no services needed per  "patient  Provided education on discharge plan: disposition pending at this time  Patient agreeable to discharge plan:  YES  A list of Medicare Certified Facilities was provided to the patient and/or family to encourage patient choice. Patient's choices for facility are:  n/a  Will NH provide Skilled rehabilitation or complex medical:   General information regarding anticipated insurance coverage and possible out of pocket cost was discussed. Patient and patient's family are aware patient may incur the cost of transportation to the facility, pending insurance payment: n/a  Barriers to discharge:  None identified    Discharge Recommendations   Anticipated Disposition:  Home, no needs identified  Transportation Needs:  Family will provide  Name of Transportation Company and Phone:  n/a    Additional comments   Patient denies need to additional services at home currently.  She plans to continue P.T as currently scheduled (goes to the \"gym\" in her building) to increase her strength.        JAQUELINE Harley, RN  Social Work Services, Emergency Dept [facility]  Pager: [phone] Mon-Sat 9 am - 9 pm, on-call/after hours pager [phone]  "

## 2018-11-17 NOTE — H&P
Cardiology History and Physical  Linda Spicer MRN: [id number]  Age: 87 year old, : 1931  Primary care provider: Tre Lockett            Assessment and Plan:     Ms. Linda Spicer is an 87 woman with a PMH notable for rheumatoid arthritis, hypothyroid, CHFpEF, paroxysmal atrial fibrillation (recent YOLANDA and DCCV 10/16/2018), HTN, sick sinus s/p PPM, CKD 3, recurrent UTIs who presents to the ED with chief complaint of dizziness and weakness for one day.       # PreSyncope  Unclear etiology. Episode sounded orthostatic vs vagal (told ED she did have a BM) in nature, but patient unable to give clear picture of what happened today. Last echocardiogram 10/2018 with normal EF of 55-60%, moderate to severe MR (worse than prior). Today patient is hemodynamically stable, on room air. Outside of one loose bowel movement, no localizing infectious symptoms. Head CT negative, CT AP unremarkable. BNP is lowest its ever been. Upon my evaluation in the ED, she reports she's feeling better. Had received 500cc NS while in the ED.   - telemetry   - orthostatics on the floor   - hold spironolactone   - enteric panel given that patient was recently started on suppressive Abx; hold senna   - will hold off on repeat echo for now given recent one 10/15/2018       # Paroxysmal Atrial Fibrillation  Currently in flutter vs fib while in ER. Per Device report, she had been it for several days without knowledge. Per family, they think she feels symptoms of afib more when she is volume overloaded; unclear if at all contributing to event today.   - continue PTA apixiban   - continue carvedilol, amiodarone for now; not likely that BB is contributing to symptoms but something to consider   - NPO at MN          -- unsure whether cardioversion may help patients symptoms as she could not tell whether or not             she's in it; will follow up with patient in AM     # CHFpEF   EDW: 154; patient reports AM weight today  156#  Recently started spironolactone with improvement in her swelling and weight. On room air, complains of baseline shortness of breath. On exam with no edema.   - check weight on floor  - continue PTA lasix  - 2g Na diet  - 2L fluid restriction       # CKD  # Elevated Creatinine 1.79  Somewhat higher creatinine than recent months, appears to be about 1.3-1.6, though lowest was about 1.14 on 10/9. BUN is about the same. BUN: Cr ~20. Good UOP per patient. UA unremarkable. Did just start trimethoprim at last urology appointment (11/9) which could also explain the rise in her creatinine. Currently not hypervolemic on exam, no electrolyte abnormalities.    - recheck in AM   - I/O  - daily weights    # Severe MR  On room air. Blood pressures acceptable. Not on any afterload reduction, but volume has been closely managed. Continue to monitor.     # Recurrent UTI - continue PTA trimethoprim   # Hypothyroid TSH wnl. continue PTA levothyroxine   # Rheumatoid Arthritis No signs of acute flares. Continue imuran  # GERD - continue ranitidine    FEN:  -2g Na   -2L fluid restriction  - no mIVF   PPX: apixiban  Code Status: DNR - would be OK with intubation for respiratory compromise   Dispo; Observation admission      Patient discussed with Dr. Carrasquillo.     Maria C Justin  [id number]  [id number]                     Chief Complaint:     Dizziness, weakness           History of Present Illness:     History is obtained from the patient     Ms. Linda Spicer is an 87 woman with a PMH notable for rheumatoid arthritis, hypothyroid, CHFpEF, paroxysmal atrial fibrillation (recent YOLANDA and DCCV 10/16/2018), sick sinus s/p PPM, CKD 3, recurrent UTIs who presents to the ED with chief complaint of presyncope x1 day.     Patient reports onset of lightheadedness and weakness this AM. She had her usual breakfast, went to the bathroom, was sitting on the toilet for a few minute - no BM that she can recall, no history of needing to strain - when she had acute  "onset of feeling like she was going to pass out. She called a friend who came over. She made her way to the hallway and eventually to the couch. She denies any symptoms right before the episode, no associated symptoms (including CP, SOB, nausea, diaphoresis) during the episode. She has never had something this bad before - though something possible similar prior to her pacemaker and associated with palpitations.     Prior to today, family has noticed she's been \"up and down\" throughout the week with good days and bad. Patient denies fevers, chills, congestion, sore throat, cough. Baseline shorntess of breath. Weight and swelling have improved since starting spironolactone.  Maintaining a good appetite. Monitoring her sodium intake, now down to less than 1.5g per day (she continues to do her own cooking). She had a loose bowel movement today, which is new - but no associated abdominal pain, she doesn't think it was bloody. No significant dysuria nor flank pain. No rash.    In the ED, she was afebrile, hemodynamically stable, saturating well on room air. Labs largely unremarkable including normal WBC count, Hgb, RUQ labs, BNP, troponin, lipase. Creatinine somewhat elevated from baseline at 1.79. Na 134, K 4.4, Cl 98, CO2 28, BUN 37. Urinalysis with leuk esterase, 2 WBC, otherwise without casts. She was given 500cc NS and admitted to cardiology for further management.   --   Last seen in [facility] 11/1. At that time, complaining of LE edema weight up2lb in 2 weeks and one lb overnight. Increased dyspnea, fatigue. At that time, her lasix was increasd to 40mg in the AM and 20mg in the PM. Amlodipine and metoprolol were dicontinued and carvedilol was started. EDW at 154lb. On 11/9, called in concerned that she was holding onto fluid despite increased lasix. On 11/11, patient started spironolactone. Reported improvement recently in her ankle swelling and weight.  On 11/12, called in with fatigue and SOB; device " interrogation showed 1 AT/AF episode lasting 22 days in duration; patient went into aflutter on 10/22/2018. AF burden - 79.1%, presenting EGM = aflutter with  at 62bpm. Patient did not feel as though she needed to go to the ED at that time.           Past Medical History:     Past Medical History:   Diagnosis Date     Adjustment disorder with anxious mood 5/18/2015     Advanced directives, counseling/discussion 8/30/2012    Patient states has Advance Directive and will bring in a copy to clinic. 8/30/2012   [facility] Medical Assistant \       Anemia 9/25/2015     Basal cell cancer      CHF (congestive heart failure) (H) 9/18/2014     CKD (chronic kidney disease) stage 3, GFR 30-59 ml/min (H) 9/29/2015     DDD (degenerative disc disease), lumbar 9/25/2015     Diffuse idiopathic pulmonary fibrosis (H) 5/6/2013     Encounter for palliative care 5/18/2015     History of blood transfusion 9/29/2015     Hypertension goal BP (blood pressure) < 140/90 9/7/2012     Hypothyroid 9/7/2012     Irritable bowel syndrome 10/29/2013     Macular degeneration      Macular degeneration, 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     Nondisplaced spiral fracture of shaft of humerus      Osteoporosis 8/13/2013     Imo Update utility     RA (rheumatoid arthritis) (H) 5/7/2013     Rheumatic fever      Shingles      Spinal stenosis of lumbar region with neurogenic claudication 9/14/2015              Past Surgical History:      Past Surgical History:   Procedure Laterality Date     ANESTHESIA CARDIOVERSION N/A 9/14/2018    Procedure: ANESTHESIA CARDIOVERSION;;  Surgeon: GENERIC ANESTHESIA PROVIDER;  Location: [facility] OR     ANESTHESIA CARDIOVERSION N/A 10/11/2018    Procedure: ANESTHESIA CARDIOVERSION;  Cardioversion;  Surgeon: GENERIC ANESTHESIA PROVIDER;  Location: [facility] OR     ANESTHESIA CARDIOVERSION N/A 10/16/2018    Procedure: Anesthesia Cardioversion ;  Surgeon: GENERIC ANESTHESIA PROVIDER;  Location: [facility] OR     APPENDECTOMY       BIOPSY       hemorrhoidectomy     ENT SURGERY      tonsillectomy     GYN SURGERY      3 D & C's     HYSTERECTOMY, PAP NO LONGER INDICATED       LAMINECTOMY LUMBAR ONE LEVEL N/A 10/13/2015    Procedure: LAMINECTOMY LUMBAR ONE LEVEL;  Surgeon: Fransico Toussaint MD;  Location:  OR              Social History:     Social History   Substance Use Topics     Smoking status: Never Smoker     Smokeless tobacco: Never Used     Alcohol use Yes      Comment: rare wine               Family History:     Family History   Problem Relation Age of Onset     Hypertension Mother      Psychotic Disorder Father      Diabetes Son      Diabetes Daughter      Blood Disease Daughter      Family history reviewed          Allergies:     Allergies   Allergen Reactions     Cephalexin Hcl Diarrhea     Gabapentin Other (See Comments)     Dizzsiness     Naproxen GI Disturbance     Perfume      Macrobid [Nitrofurantoin Anhydrous]      Possibly related to lung disease      Seasonal Allergies      Sulfa Drugs      Throat swelling     Xanax [Alprazolam] Other (See Comments)     Dizziness      Ciprofloxacin Itching and Rash              Medications:     Reviewed. Notable for  abatacept Q28 days  Acetaminophen 1000mg BID PRN  Albuterol 0.083% neb Q6H PRN  Amiodarone 200mg QD   apixiban 2.5mg BID   Azathioprine 50mg every day  Carvedilol 6.26mg BID  Cetirizine  Furosemide 40mg BID  Levothyroxine 75mcg every day  Ranitidine 150mg BId  Spironolactone 12.5mg every day   trimethorpime 100mg every day                 Physical Exam:     Temp:  [97.8  F (36.6  C)] 97.8  F (36.6  C)  Pulse:  [62] 62  Heart Rate:  [60-61] 60  Resp:  [13-18] 13  BP: (102-141)/(42-86) 119/52  SpO2:  [96 %-100 %] 97 %      Gen: Resting comfortably in bed, NAD   HEENT:AT/ NC, PERRL b/l, EOM grossly intact, MMM   BACK: no CVA tenderness, no midline bony tenderness  PULM/THORAX: Normal effort on room air. Anteriorly clear to auscultation bilaterally, no rales/rhonchi/wheezes  CV: Irregular.  Normal rate. No murmurs noted. Difficult to assess JVD 2/2 body habitus.   ABD: soft, nontender, nondistended. Normoactive bowel sounds.   EXT: Warm, well perfused. No LE edema noted.  NEURO: AOx4. Speech fluent and articulate. No focal deficits appreciated.             Data:     Labs Reviewed on Admission  Na 134   CL 98   BUN 37                              TB 0.6    Alk Phos 60      ALT 22    AST 24   K 4.4     Co2 28   Cr 1.79    NT BNP 1562                                                 WBC 6.2      Hgb 12.7     Plt 255   Lipase 116  Troponin < 0.015    Urinalysis: moderate LE, 2 WBC, 3 RBC, 3 epithelial cells           Most Recent Imagin2018 CXR  Findings: Single AP view of the chest. Left chest wall cardiac device  leads in stable position. Cardiac silhouette is within normal limits,  unchanged. Basilar predominant interstitial opacities, similar to  mildly increased compared to the prior exam. No pleural effusion. No  pneumothorax. No acute airspace disease. Upper abdomen is  unremarkable.  Impression:   Mildly increased basilar reticular opacities from 2017, likely  atelectasis versus progression of interstitial lung disease.       2018 CT Head  Impression:   1. No acute intracranial pathology.   2. Mild cerebral volume loss and chronic small vessel ischemic  Changes.    2018 CT AP  IMPRESSION:   1. No acute findings of the abdomen/pelvis to explain patient's  symptoms.  2. Cholelithiasis without evidence of cholecystitis.  3. Diverticulosis without evidence of diverticulitis.  4. Trace amount of gas within the bladder, correlate for recent  catheterization or evidence of cystitis.  5. Basilar interstitial lung disease.  6. Cystic pancreatic lesion, stable from 2017, not seen in 2016.  Differential includes cystic pancreatic neoplasm, IPMN, or sequela  pancreatitis. Consider follow-up MRI with contrast.      10/16/2018 Echocardiogram (YOLANDA)  Interpretation Summary  The left  atrial appendage is normal. It is free of spontaneous echo contrast  and thrombus.     The Ejection Fraction is estimated at 55-60%.  Global right ventricular function is normal.  Moderate to severe mitral insufficiency is present.  Moderate tricuspid insufficiency is present.  Multiple MR jets noted. There is blunted systolic forward flow in 2 of the  pulmonary veins. MR volume is estimated at 34ml, however this likely  underestimates MR due to multiple jets.     Compared to prior TTE on 10/9/2018, MR appears to be worse.       CARDIOLOGY STAFF NOTE  I have seen and examined the patient with the Cards 1 team. I agree with the note above that reflects my assessment and plan of care.  The patient was seen on 11/18/18. Please refer to progress note dated 11/18/18 for details of assessment and plan.  Franki Carrasquillo MD [facility]  Cardiology - Electrophysiology

## 2018-11-18 ENCOUNTER — PATIENT OUTREACH (OUTPATIENT)
Dept: CARE COORDINATION | Facility: CLINIC | Age: 83
End: 2018-11-18

## 2018-11-18 ENCOUNTER — APPOINTMENT (OUTPATIENT)
Dept: PHYSICAL THERAPY | Facility: CLINIC | Age: 83
End: 2018-11-18
Payer: MEDICARE

## 2018-11-18 VITALS
SYSTOLIC BLOOD PRESSURE: 165 MMHG | BODY MASS INDEX: 26.91 KG/M2 | RESPIRATION RATE: 14 BRPM | TEMPERATURE: 98.6 F | OXYGEN SATURATION: 97 % | HEART RATE: 62 BPM | DIASTOLIC BLOOD PRESSURE: 87 MMHG | WEIGHT: 156.8 LBS

## 2018-11-18 LAB
ANION GAP SERPL CALCULATED.3IONS-SCNC: 7 MMOL/L (ref 3–14)
BUN SERPL-MCNC: 37 MG/DL (ref 7–30)
CALCIUM SERPL-MCNC: 9 MG/DL (ref 8.5–10.1)
CHLORIDE SERPL-SCNC: 102 MMOL/L (ref 94–109)
CO2 SERPL-SCNC: 29 MMOL/L (ref 20–32)
CREAT SERPL-MCNC: 1.72 MG/DL (ref 0.52–1.04)
GFR SERPL CREATININE-BSD FRML MDRD: 28 ML/MIN/1.7M2
GLUCOSE SERPL-MCNC: 87 MG/DL (ref 70–99)
MAGNESIUM SERPL-MCNC: 2 MG/DL (ref 1.6–2.3)
POTASSIUM SERPL-SCNC: 4.1 MMOL/L (ref 3.4–5.3)
SODIUM SERPL-SCNC: 138 MMOL/L (ref 133–144)

## 2018-11-18 PROCEDURE — A9270 NON-COVERED ITEM OR SERVICE: HCPCS | Mod: GY | Performed by: INTERNAL MEDICINE

## 2018-11-18 PROCEDURE — 99218 ZZC INITIAL OBSERVATION CARE,LEVL I: CPT | Mod: GC | Performed by: INTERNAL MEDICINE

## 2018-11-18 PROCEDURE — G0378 HOSPITAL OBSERVATION PER HR: HCPCS

## 2018-11-18 PROCEDURE — 40000193 ZZH STATISTIC PT WARD VISIT

## 2018-11-18 PROCEDURE — 97530 THERAPEUTIC ACTIVITIES: CPT | Mod: GP

## 2018-11-18 PROCEDURE — 97161 PT EVAL LOW COMPLEX 20 MIN: CPT | Mod: GP

## 2018-11-18 PROCEDURE — 80048 BASIC METABOLIC PNL TOTAL CA: CPT | Performed by: INTERNAL MEDICINE

## 2018-11-18 PROCEDURE — 25000131 ZZH RX MED GY IP 250 OP 636 PS 637: Mod: GY | Performed by: INTERNAL MEDICINE

## 2018-11-18 PROCEDURE — 83735 ASSAY OF MAGNESIUM: CPT | Performed by: INTERNAL MEDICINE

## 2018-11-18 PROCEDURE — 36415 COLL VENOUS BLD VENIPUNCTURE: CPT | Performed by: INTERNAL MEDICINE

## 2018-11-18 PROCEDURE — 25000132 ZZH RX MED GY IP 250 OP 250 PS 637: Mod: GY | Performed by: INTERNAL MEDICINE

## 2018-11-18 RX ORDER — FUROSEMIDE 40 MG
40 TABLET ORAL
Status: DISCONTINUED | OUTPATIENT
Start: 2018-11-18 | End: 2018-11-18 | Stop reason: HOSPADM

## 2018-11-18 RX ADMIN — FUROSEMIDE 40 MG: 40 TABLET ORAL at 07:38

## 2018-11-18 RX ADMIN — AMIODARONE HYDROCHLORIDE 200 MG: 200 TABLET ORAL at 07:38

## 2018-11-18 RX ADMIN — LEVOTHYROXINE SODIUM 75 MCG: 25 TABLET ORAL at 07:38

## 2018-11-18 RX ADMIN — APIXABAN 2.5 MG: 2.5 TABLET, FILM COATED ORAL at 07:40

## 2018-11-18 RX ADMIN — TRIMETHOPRIM 50 MG: 100 TABLET ORAL at 07:39

## 2018-11-18 RX ADMIN — RANITIDINE 150 MG: 150 TABLET, FILM COATED ORAL at 07:38

## 2018-11-18 RX ADMIN — CARVEDILOL 6.25 MG: 6.25 TABLET, FILM COATED ORAL at 07:38

## 2018-11-18 RX ADMIN — AZATHIOPRINE 50 MG: 50 TABLET ORAL at 07:39

## 2018-11-18 NOTE — DISCHARGE SUMMARY
"[facility], [place]    Discharge Summary  Cardiology 1     Date of Admission:  11/17/2018  Date of Discharge:  11/18/2018     Discharge Diagnoses    Presyncope   Vasovagal, Orthostatic     History of Present Illness     Ms. Linda Spicer is an 87 woman with a PMH notable for rheumatoid arthritis, hypothyroid, CHFpEF, paroxysmal atrial fibrillation (recent YOLANDA and DCCV 10/16/2018), sick sinus s/p PPM, CKD 3, recurrent UTIs who presents to the ED with chief complaint of presyncope x1 day.      Patient reports onset of lightheadedness and weakness this AM. She had her usual breakfast, went to the bathroom, was sitting on the toilet for a few minutes - cannot recall if she had a BM for me (though told family and ED that she did) but denied any need to strain - when she had acute onset of feeling like she was going to pass out. She called a family member who came over. She made her way to the hallway and eventually to the couch. She denies any symptoms right before the episode, no associated symptoms (including CP, SOB, nausea, diaphoresis) during the episode. She has never had something this bad before - though something possible similar prior to her pacemaker and associated with palpitations.      Prior to today, family has noticed she's been \"up and down\" throughout the week with good days and bad. Patient denies fevers, chills, congestion, sore throat, cough. Baseline shortness of breath. Weight and swelling have improved since starting spironolactone.  Maintaining a good appetite. Monitoring her sodium intake, now down to less than 1.5g per day (she continues to do her own cooking). She had a loose bowel movement today, which is new - but no associated abdominal pain, she doesn't think it was bloody. No significant dysuria nor flank pain. No rash.     In the ED, she was afebrile, hemodynamically stable, saturating well on room air. Labs largely unremarkable including normal WBC count, " Hgb, RUQ labs, BNP, troponin, lipase. Creatinine somewhat elevated from baseline at 1.79. Na 134, K 4.4, Cl 98, CO2 28, BUN 37. Urinalysis with leuk esterase, 2 WBC, otherwise without casts. She was given 500cc NS and admitted to cardiology for further management.     Hospital Course        # PreSyncope  Vasovagal likely exacerbated by orthostasis.   No evidence for arrhythmia on device, she was hemodynamically stable, on room air. Labs were largely unremarkable, no signs of infection. Did not repeat echo as she just had one 10/15/2018. She received 500cc NS in the ED. Enteric panel was ordered as she was having diarrhea in the ED, in conjunction with recent initiation of antibiotics, however she had no further BMs on the floor.   On day of discharge, she reported feeling much better. She remained hemodynamically stable and without any further events during her stay.    Follow up:   1. She will hold spironolactone for two days unless she gains weight; she is to call into [facility] for ongoing management at that time        # Paroxysmal Atrial Fibrillation  In flutter vs fib while in ER. Per Device report, she had been it for several days without knowledge. Per family, they think she feels symptoms of afib more when she is volume overloaded; unclear if at all contributing to event leading to admission. By the morning, it was noted that the atrial fibrillation had resolved and she was A-paced.     Question of patient possibly feeling better with A-pacing versus V- pacing was proposed. Discussed with patient potential for considering His bundle pacemaker, but prior to that would be prudent to reprogram her device for A pacing only.     Follow up   1. Appointment originally scheduled with Dr. Carrasquillo on Wednesday; will reschedule 1 month out; advised scheduling team   2. Continue amiodarone, apixiban, carvedilol                      # CHFpEF   EDW: 154; patient reports AM weight today 156#  Recently started  spironolactone with improvement in her swelling and weight. On room air, complains of baseline shortness of breath. On exam with no edema. Discharge plan as noted above. She will continue her lasix at PTA dose.         # CKD  # Elevated Creatinine 1.79  Somewhat higher creatinine than recent months, appears to be about 1.3-1.6, though lowest was about 1.14 on 10/9. BUN is about the same. BUN: Cr ~20. Good UOP per patient. UA unremarkable. Did just start trimethoprim at last urology appointment (11/9) which could also explain the rise in her creatinine. Not hypervolemic on exam, no electrolyte abnormalities.  Remained stable, good uop overnight. Follow up with [facility] for ongoing management.      # Severe MR  On room air with only baseline SOB on admission. Blood pressures acceptable. Not on any afterload reduction, but volume has been closely managed. No issues during this stay.      # Recurrent UTI - continue PTA trimethoprim   # Hypothyroid TSH wnl. continue PTA levothyroxine   # Rheumatoid Arthritis No signs of acute flares. Continue imuran  # GERD - continue ranitidine    Patient seen and discussed with Dr. Carrasquillo.     Maria C Justin   [facility]  [id number]     CARDIOLOGY STAFF NOTE  I have seen and examined the patient with the Cards 1 team. I agree with the note above that reflects my assessment and plan of care.  Franki Carrasquillo MD [facility]  Cardiology - Electrophysiology      Code Status   DNR    Primary Care Physician   Tre Alvarado Vocal    Physical Exam   Temp: 98.6  F (37  C) Temp src: Oral BP: 165/87   Heart Rate: 61 Resp: 14 SpO2: 97 % O2 Device: None (Room air)    Vitals:    11/17/18 2200 11/18/18 0349   Weight: 71.6 kg (157 lb 14.4 oz) 71.1 kg (156 lb 12.8 oz)     Vital Signs with Ranges  Temp:  [97.1  F (36.2  C)-98.7  F (37.1  C)] 98.6  F (37  C)  Heart Rate:  [59-63] 61  Resp:  [13-42] 14  BP: (102-165)/(42-87) 165/87  SpO2:  [92 %-100 %] 97 %  I/O last 3 completed shifts:  In: -   Out: 200  [Urine:200]    General: resting comfortably in chair at bedside, NAD   HEENT: anicteric sclera, clear conjunctiva  Respiratory: normal effort on room air. CTAB.   Cardiovascular: RRR. No murmurs noted.   Abdomen: soft, NT, ND   Skin: warm, dry. No rash.   Extremities warm, well perfused, no LE edema   Neurologic: alert, oriented. Speech fluent and articulate. Face symmetric. No lateralizing deficits noted.       Time Spent on this Encounter   I, Ashley Justin, personally saw the patient today and spent greater than 30 minutes discharging this patient.    Discharge Disposition   Discharged to home  Condition at discharge: Stable    Consultations This Hospital Stay   PHYSICAL THERAPY ADULT IP CONSULT  SMOKING CESSATION PROGRAM IP CONSULT    Discharge Orders     Reason for your hospital stay   Vagal versus Orthostatic Pre Syncope     Adult [facility] Follow-up and recommended labs and tests   Follow up with Dr. Carrasquillo in a month - all your things scheduled for Monday will happen them  Follow up with [facility] (call in)     Appointments on [place] and/or [facility] (with [facility] provider or service). Call [phone] if you haven't heard regarding these appointments within 7 days of discharge.     Activity   Your activity upon discharge: activity as tolerated     Discharge Instructions   1. Continue to check your weights daily  2. Hold spironolactone for the next few days - monitor your weights. If you find you are gaining weight too rapidly, take it.  3. Touch base with [facility] this week   4 You will have follow up with Dr. Carrasquillo in a month - your Wednesday appointments will be rescheduled     DNR (Do Not Resuscitate)     Diet   Follow this diet upon discharge: 2g Na, 2L fluid restriction       Discharge Medications   Current Discharge Medication List      CONTINUE these medications which have NOT CHANGED    Details   furosemide (LASIX) 20 MG tablet Take 2 tablets (40 mg) by mouth 2 times  daily  Qty: 120 tablet, Refills: 0    Associated Diagnoses: Heart failure with preserved ejection fraction, NYHA class I (H)      Abatacept (ORENCIA IV) Inject into the vein every 28 days      ACETAMINOPHEN PO Take 1,000 mg by mouth 2 times daily as needed       albuterol (2.5 MG/3ML) 0.083% neb solution Take 1 vial by nebulization every 6 hours as needed for shortness of breath / dyspnea or wheezing      amiodarone (PACERONE/CODARONE) 200 MG tablet 1st week: 400mg twice a day. 2nd week: 200mg twice a day. Then 200mg daily  Qty: 120 tablet, Refills: 3    Associated Diagnoses: Paroxysmal atrial fibrillation (H)      apixaban ANTICOAGULANT (ELIQUIS) 2.5 MG tablet Take 1 tablet (2.5 mg) by mouth 2 times daily  Qty: 180 tablet, Refills: 3    Associated Diagnoses: Atrial flutter, paroxysmal (H)      Artificial Tear Ointment (REFRESH P.M.) OINT Apply  to eye. Daily at bedtime         Ascorbic Acid (VITAMIN C PO) Take 500 mg by mouth daily       azaTHIOprine (IMURAN) 50 MG tablet Take 1 tablet (50 mg) by mouth daily  Qty: 90 tablet, Refills: 2    Associated Diagnoses: Rheumatoid arthritis involving multiple sites with positive rheumatoid factor (H)      Blood Pressure Monitor KIT 1 kit daily as needed  Qty: 1 kit, Refills: 0    Associated Diagnoses: CHF (congestive heart failure) (H)      calcium-vitamin D (CALTRATE) 600-400 MG-UNIT per tablet Take 1 tablet by mouth 2 times daily       carvedilol (COREG) 6.25 MG tablet Take 1 tablet (6.25 mg) by mouth 2 times daily (with meals)  Qty: 60 tablet, Refills: 1    Associated Diagnoses: Heart failure with preserved ejection fraction, NYHA class I (H); Benign essential hypertension      cetirizine (ZYRTEC ALLERGY) 10 MG tablet Take 10 mg by mouth At Bedtime      COMPOUNDED NON-CONTROLLED SUBSTANCE (CMPD RX) - PHARMACY TO MIX COMPOUNDED MEDICATION Estriol 1mg/gram. Place 1 gram vaginally daily for two weeks, then vaginally twice weekly after.  Qty: 30 g, Refills: 6    Associated  Diagnoses: Atrophic vaginitis      fish oil-omega-3 fatty acids (FISH OIL) 1000 MG capsule Take 2 g by mouth daily. 2 capsules daily           folic acid (FOLVITE) 1 MG tablet Take 1 tablet (1 mg) by mouth daily  Qty: 90 tablet, Refills: 3    Associated Diagnoses: Oral aphthae      hydrocortisone 2.5 % cream Apply BID to affected region(s) for 7-10 days.  Qty: 30 g, Refills: 0    Associated Diagnoses: Rash      Hypromellose (GENTEAL MILD OP) Apply  to eye daily.      levothyroxine (SYNTHROID/LEVOTHROID) 75 MCG tablet TAKE ONE TABLET BY MOUTH ONCE DAILY  Qty: 90 tablet, Refills: 1    Associated Diagnoses: Hypothyroidism, unspecified type      Multiple Vitamins-Minerals (PRESERVISION AREDS PO) Take 1 tablet by mouth daily       nitroglycerin (NITROSTAT) 0.4 MG SL tablet Place 1 tablet (0.4 mg) under the tongue every 5 minutes as needed for chest pain  Qty: 25 tablet, Refills: 0    Associated Diagnoses: Chest pain      !! order for DME Dispense SP Walker-small  Qty: 1 each, Refills: 0    Associated Diagnoses: Pain of toe of right foot; Status post bunionectomy      !! order for DME Equipment being ordered: Dispense baffle, for use with nebulizer.  Qty: 1 each, Refills: 0    Associated Diagnoses: Pneumonia of left upper lobe due to Mycoplasma pneumoniae      !! order for DME Equipment being ordered: Nebulizer. Use with Albuterol.  Qty: 1 each, Refills: 0    Associated Diagnoses: Hypoxia      !! order for DME Equipment being ordered: Dispense face mask.  Mrs. Spicer is immunosuppressed due to rheumatoid arthritis.  Qty: 1 Box, Refills: 11    Associated Diagnoses: Rheumatoid arthritis involving left wrist with positive rheumatoid factor (H)      ranibizumab (LUCENTIS) 0.3 MG/0.05ML SOLN 0.3 mg by Intravitreal route Every 6 weeks      ranitidine (ZANTAC) 150 MG tablet Take 1 tablet (150 mg) by mouth 2 times daily  Qty: 60 tablet, Refills: 5    Associated Diagnoses: Gastroesophageal reflux disease without esophagitis       saccharomyces boulardii (FLORASTOR) 250 MG capsule Take 250 mg by mouth daily      senna-docusate (SENOKOT-S;PERICOLACE) 8.6-50 MG per tablet Take 2 tablets by mouth At Bedtime Hold if diarrhea occurs.  Qty: 120 tablet, Refills: 11    Associated Diagnoses: Constipation, chronic      spironolactone (ALDACTONE) 25 MG tablet Take 0.5 tablets (12.5 mg) by mouth daily  Qty: 30 tablet, Refills: 1    Associated Diagnoses: Heart failure with preserved ejection fraction, NYHA class I (H)      trimethoprim (TRIMPEX) 100 MG tablet Take 0.5 tablets (50 mg) by mouth daily  Qty: 45 tablet, Refills: 1    Associated Diagnoses: Recurrent UTI       !! - Potential duplicate medications found. Please discuss with provider.        Allergies   Allergies   Allergen Reactions     Cephalexin Hcl Diarrhea     Gabapentin Other (See Comments)     Dizzsiness     Naproxen GI Disturbance     Perfume      Macrobid [Nitrofurantoin Anhydrous]      Possibly related to lung disease      Seasonal Allergies      Sulfa Drugs      Throat swelling     Xanax [Alprazolam] Other (See Comments)     Dizziness      Ciprofloxacin Itching and Rash     Data   Most Recent 3 CBC's:  Recent Labs   Lab Test  11/17/18   1124  11/08/18   1010  10/09/18   0553   WBC  6.2  6.9  6.5   HGB  12.7  11.7  11.5*   MCV  103*  102*  99   PLT  255  266  219     Most Recent 3 BMP's:  Recent Labs   Lab Test  11/18/18   0608  11/17/18   1124  11/08/18   1010   NA  138  134  140   POTASSIUM  4.1  4.4  3.9   CHLORIDE  102  98  102   CO2  29  28  30   BUN  37*  37*  39*   CR  1.72*  1.79*  1.47*   ANIONGAP  7  8  8   FATOUMATA  9.0  9.3  9.1   GLC  87  191*  116*     Most Recent 3 BNP's:  Recent Labs   Lab Test  11/17/18   1124  10/29/18   0910  10/16/18   0734  10/08/18   1353  08/09/18   0958   NTBNPI  1562   --    --   4581*   --    NTBNP   --   3154*  3657*   --   1159*

## 2018-11-18 NOTE — PLAN OF CARE
Problem: Patient Care Overview  Goal: Plan of Care/Patient Progress Review  Observation goals PER UNIT ROUTINE     Comments: Ambulate independently without symptoms -Not yet will do more walking, walker ordered  At baseline weight on oral medications -Yes

## 2018-11-18 NOTE — PLAN OF CARE
Problem: Patient Care Overview  Goal: Plan of Care/Patient Progress Review  Outcome: No Change  Pt arrived from ED via cart.

## 2018-11-18 NOTE — PLAN OF CARE
Problem: Patient Care Overview  Goal: Plan of Care/Patient Progress Review  Patient discharged to home and son accompanied her, discharge instructions were read and given to the patient.

## 2018-11-18 NOTE — PROGRESS NOTES
11/18/18 0930   Quick Adds   Type of Visit Initial PT Evaluation   Living Environment   Lives With alone   Living Arrangements independent living facility   Home Accessibility no concerns   Number of Stairs to Enter Home 0   Number of Stairs Within Home 0   Stair Railings at Home none   Living Environment Comment pt lives alone in ILF, receives home care services and some assistance for meals from family. Family not able to provide 24 hour assistance at this time   Self-Care   Dominant Hand right   Usual Activity Tolerance moderate   Current Activity Tolerance fair   Regular Exercise no   Equipment Currently Used at Home raised toilet;shower chair;walker, rolling;wheelchair, power   Activity/Exercise/Self-Care Comment pt is indep with ADLs, uses a FWW in apartment and 4WW in hallways, motorized cart in grocery stores   Functional Level Prior   Ambulation 1-->assistive equipment   Transferring 1-->assistive equipment   Toileting 0-->independent   Bathing 0-->independent   Dressing 0-->independent   Eating 0-->independent   Communication 0-->understands/communicates without difficulty   Swallowing 0-->swallows foods/liquids without difficulty   Cognition 0 - no cognition issues reported   Fall history within last six months yes   Number of times patient has fallen within last six months 1   Prior Functional Level Comment ind ambulation using FWW short distances, 4WW longer distances   General Information   Onset of Illness/Injury or Date of Surgery - Date 11/17/18   Referring Physician Ashley Justin MD   Patient/Family Goals Statement go home   Pertinent History of Current Problem (include personal factors and/or comorbidities that impact the POC) Ms. Linda Spicer is an 87 woman with a PMH notable for rheumatoid arthritis, hypothyroid, CHFpEF, paroxysmal atrial fibrillation (recent YOLANDA and DCCV 10/16/2018), HTN, sick sinus s/p PPM, CKD 3, recurrent UTIs who presents to the ED with chief complaint of  dizziness and weakness for one day.    Precautions/Limitations fall precautions   General Info Comments activity: up with assist   Cognitive Status Examination   Orientation orientation to person, place and time   Level of Consciousness alert   Follows Commands and Answers Questions 100% of the time;able to follow multistep instructions   Personal Safety and Judgment intact   Memory intact   Pain Assessment   Patient Currently in Pain No   Integumentary/Edema   Integumentary/Edema no deficits were identifed   Posture    Posture Not impaired   Range of Motion (ROM)   ROM Comment B LE WFL    Strength   Strength Comments grossly assessed at EOB 4/5 strength   Bed Mobility   Bed Mobility Comments mod I with bed rail    Transfer Skills   Transfer Comments indep   Gait   Gait Comments CGA with FWW 15 feet   Balance   Balance Comments standing static balance good; dynamic balance fair - needs walker for support   Sensory Examination   Sensory Perception no deficits were identified   Coordination   Coordination no deficits were identified   Muscle Tone   Muscle Tone no deficits were identified   General Therapy Interventions   Planned Therapy Interventions gait training;neuromuscular re-education;balance training;strengthening;risk factor education;home program guidelines;progressive activity/exercise   Clinical Impression   Criteria for Skilled Therapeutic Intervention yes, treatment indicated   PT Diagnosis impaired functional mobility   Influenced by the following impairments weakness, decreased activity tolerance, impaired dynamic balance   Functional limitations due to impairments decreased indep with functional mobility   Clinical Presentation Evolving/Changing   Clinical Presentation Rationale complex medical history   Clinical Decision Making (Complexity) Low complexity   Therapy Frequency` daily   Predicted Duration of Therapy Intervention (days/wks) 2 days   Anticipated Discharge Disposition Transitional Care  "Facility;Home with Home Therapy  (TBD)   Risk & Benefits of therapy have been explained Yes   Patient, Family & other staff in agreement with plan of care Yes   [facility] TM \"6 Clicks\"   2016, Trustees of [facility], under license to [employer].  All rights reserved.   6 Clicks Short Forms Basic Mobility Inpatient Short Form   [facility] AM-PAC  \"6 Clicks\" V.2 Basic Mobility Inpatient Short Form   1. Turning from your back to your side while in a flat bed without using bedrails? 4 - None   2. Moving from lying on your back to sitting on the side of a flat bed without using bedrails? 4 - None   3. Moving to and from a bed to a chair (including a wheelchair)? 4 - None   4. Standing up from a chair using your arms (e.g., wheelchair, or bedside chair)? 4 - None   5. To walk in hospital room? 3 - A Little   6. Climbing 3-5 steps with a railing? 3 - A Little   Basic Mobility Raw Score (Score out of 24.Lower scores equate to lower levels of function) 22   Total Evaluation Time   Total Evaluation Time (Minutes) 10     "

## 2018-11-18 NOTE — PLAN OF CARE
Problem: PT General Care Plan  Goal: Aerobic Activity w/Stable CV Response (PT)  PT Goal: Aerobic Activity with Stable Cardiovascular Response  Outcome: Change based on patient need/priority Date Met: 11/18/18

## 2018-11-18 NOTE — PLAN OF CARE
Problem: Patient Care Overview  Goal: Discharge Needs Assessment  Problem: Patient Care Overview  Goal: Discharge Needs Assessment  Problem: Patient Care Overview  Goal: Discharge Needs Assessment  Observation goals PER UNIT ROUTINE     Comments: Ambulate independently without symptoms:  In bed this night. Up with 1 assist  to bedside commode and reported slight dizziness.    At baseline weight on oral medications: Yes, weight slightly down. Lasix on hold.   Voided 200 ml clear urine entire shift. Provider paged during the night and updated. No BM overnight.  NPO   Ortho BP completed..  /64  Pulse 62  Temp 97.1  F (36.2  C) (Oral)  Resp (!) 42  Wt 71.1 kg (156 lb 12.8 oz)  LMP  (LMP Unknown)  SpO2 92%  BMI 26.91 kg/m2

## 2018-11-18 NOTE — PLAN OF CARE
Problem: Patient Care Overview  Goal: Plan of Care/Patient Progress Review  Discharge Planner PT   Patient plan for discharge: desires to discharge home, family advocating for TCU  Current status: patient demos indep bed mobility and transfers. Ambulates with CGA and use of FWW ~60 feet, slow gait speed and patient with 2 LOB secondary to dizziness.   Barriers to return to prior living situation: level of assist, medical stability  Recommendations for discharge: recommend TCU in order to work on strengthening, balance, and activity tolerance; if pt refuses, recommend home with home PT and initial 24 hour assistance   Rationale for recommendations: see above       Entered by: Valery Lisa 11/18/2018 9:26 AM       Physical Therapy Discharge Summary    Reason for therapy discharge:    Discharged to home with home therapy.    Progress towards therapy goal(s). See goals on [facility] electronic [facility] record for goal details.  Goals not met.  Barriers to achieving goals:   discharge on same date as initial evaluation.    Therapy recommendation(s):    Continued therapy is recommended.  Rationale/Recommendations:  home PT to progress safety and indep with functional mobility.

## 2018-11-18 NOTE — TELEPHONE ENCOUNTER
Preliminary Device Interrogation Results.  Final physician signed paceart report to be scanned and attached.    [employer] remote transmission received from the ER.  Remote pacemaker transmission received and reviewed.  Device transmission sent per MD orders.  Patient has a [employer]tronic dual lead pacemaker.  Normal pacemaker function.  3 AT/AF episodes recorded - 13 hrs - > 99 hrs in duration.  AF burden = 73.1%.  Patient is taking Eliquis.  Presenting EGM = AF with  @ 60 bpm.  AP = 25.9%.   = 62%.  Estimated battery longevity to ZENAIDA = 6.5 years.  ER MD notified of interrogation results.    Remote pacemaker transmission

## 2018-11-18 NOTE — PLAN OF CARE
Problem: Patient Care Overview  Goal: Discharge Needs Assessment  Observation goals PER UNIT ROUTINE     Comments: Ambulate independently without symptoms: Pending    At baseline weight on oral medications; Pending  /50 (BP Location: Right arm)  Pulse 62  Temp 98.7  F (37.1  C) (Oral)  Resp 18 SpO2 96% RA

## 2018-11-18 NOTE — PROGRESS NOTES
Social Work Services Progress Note    Hospital Day: 1 (observation status)  Date of Initial Social Work Evaluation:  11/17/2018  Collaborated with:  PT, Bedside RN, Pt, son, Daniel, in room and dtr, Xi, via phone     Data:  SW consulted for potential need for TCU placement. Collaborated w/ PT, whom is recommending TCU. Writer reviewed pt's insurance information and noted that is currently in observation status. Further reviewed any potential inpatient admissions in the last 30 days and pt was admitted to [facility] 10/8-10/12, which is outside of the 30 day window. At this time pt is a private pay for TCU placement.     Met w/ pt, son, and dtr was on speaker phone. Discussed above information with pt and family and they understand pt would be a private pay at TCU. Pt's son and dtr are concerned with pt returning to [facility]. Pt reluctantly agreeable to referrals to TCU facilities; pt would prefer to return home. Pt and family preference for facilities are [facility] and [facility]. Referral initiated to [facility] and accepted for admission with a $6000 upfront payment. [facility] does not anticipate beds until late next week.     Writer went back to update pt and in the mean time the Primary Team had seen pt and plan is to discharge home.     Intervention:  Discharge Planning     Assessment:  Pt is very eager that she will be able to discharge home. Pt appears to have good support from her family and friends at the [place].     Plan:    Anticipated Disposition:  Home, no needs identified    Barriers to d/c plan:  None anticipated     Follow Up:  No further SW needs identified. Pt's son to transport home.

## 2018-11-18 NOTE — PLAN OF CARE
Problem: Patient Care Overview  Goal: Discharge Needs Assessment  Problem: Patient Care Overview  Goal: Discharge Needs Assessment  Observation goals PER UNIT ROUTINE     Comments: Ambulate independently without symptoms: Pending    At baseline weight on oral medications; Pending  /50 (BP Location: Right arm)  Pulse 62  Temp 98.7  F (37.1  C) (Oral)  Resp 18 SpO2 96% RA

## 2018-11-19 ENCOUNTER — PATIENT OUTREACH (OUTPATIENT)
Dept: CARE COORDINATION | Facility: CLINIC | Age: 83
End: 2018-11-19

## 2018-11-19 LAB
BACTERIA SPEC CULT: ABNORMAL
Lab: ABNORMAL
SPECIMEN SOURCE: ABNORMAL

## 2018-11-19 ASSESSMENT — ACTIVITIES OF DAILY LIVING (ADL): DEPENDENT_IADLS:: COOKING;CLEANING;LAUNDRY;SHOPPING;MEDICATION MANAGEMENT;MONEY MANAGEMENT;TRANSPORTATION

## 2018-11-19 NOTE — PROGRESS NOTES
[employer] remote transmission received from the ER.  Remote pacemaker transmission received and reviewed.  Device transmission sent per MD orders.  Patient has a Medtronic dual lead pacemaker.  Normal pacemaker function.  3 AT/AF episodes recorded - 13 hrs - > 99 hrs in duration.  AF burden = 73.1%.  Patient is taking Eliquis.  Presenting EGM = AF with  @ 60 bpm.  AP = 25.9%.   = 62%.  Estimated battery longevity to ZENAIDA = 6.5 years.  ER MD notified of interrogation results.     Remote pacemaker transmission

## 2018-11-19 NOTE — PROGRESS NOTES
Clinic Care Coordination Contact    Clinic Care Coordination Contact  OUTREACH    Referral Information:  Referral Source: IP Report    Primary Diagnosis: CHF - atrial fib  Chief Complaint   Patient presents with     Clinic Care Coordination - Post Hospital     RN   [place] Utilization:   Clinic Utilization  Difficulty keeping appointments:: No  Compliance Concerns: No  No-Show Concerns: No  No PCP office visit in Past Year: No  Utilization    Last refreshed: 11/19/2018  9:29 AM:  No Show Count (past year) 1       Last refreshed: 11/19/2018  9:29 AM:  ED visits 0       Last refreshed: 11/19/2018  9:29 AM:  Hospital admissions 2          Current as of: 11/19/2018  9:29 AM         Clinical Concerns:    Current Medical Concerns:  Patient was inpatient at [facility] from 11/17/18 to 11/18/18 for pre-syncope, vasovagal.     Patient states she is feeling much better. No feeling of lightheadedness or weakness. She states she had a BM this am with no difficulty. She is eating well and states she slept really good last night.     Patient states she is going to wait to take a shower until her daughter can be in the apartment with her.        Current Behavioral Concerns: Denies concerns      Education Provided to patient: Safety in the BR     Pain  Pain (GOAL):: No  Health Maintenance Reviewed:      Clinical Pathway: CHF  Patient is [facility] closely by [facility].    She weighs daily.     She states that she was on too high a dose of diuretic, since that has been decreased she has felt good.     Medication Management:  Manages her own medications - she is knowledgeable about medications she is taking      Functional Status:  Dependent ADLs:: Bathing, Ambulation-walker  Dependent IADLs:: Cooking, Cleaning, Laundry, Shopping, Medication Management, Money Management, Transportation  Bed or wheelchair confined:: No  Mobility Status: Independent w/Device  Fallen 2 or more times in the past year?: No  Any fall with injury in the past  year?: No    Living Situation:  Current living arrangement:: I live alone  Type of residence:: Independent Senior Living    Diet/Exercise/Sleep:  Diet:: Low cholesterol, Low saturated fat, No added salt  Inadequate nutrition (GOAL):: No  Food Insecurity: No  Tube Feeding: No  Exercise:: Currently not exercising  Inadequate activity/exercise (GOAL):: No  Significant changes in sleep pattern (GOAL): No    Patient watches her sodium intake closely    Transportation:  Transportation concerns (GOAL):: No  Transportation means:: Family, [place] mobility     Psychosocial:  Muslim or spiritual beliefs that impact treatment:: No  Mental health DX:: No  Mental health management concern (GOAL):: No  Informal Support system:: [place] based, Family, Friends, Neighbors, Spouse     Financial/Insurance:   Financial/Insurance concerns (GOAL):: No     Resources and Interventions:  Current Resources:    ;   Community Resources: None  Supplies used at home:: None  Equipment Currently Used at Home: raised toilet, shower chair, walker, rolling, wheelchair, power    Advance Care Plan/Directive  Advanced Care Plans/Directives on file:: In process  Advanced Care Plan/Directive Status: In Process    Referrals Placed: None     Goals:   Goals        General    #1 I will complete my health care directive. (pt-stated)     Notes - Note edited  8/21/2018 10:39 AM by Behl, Melissa K, RN    Goal Statement: I will complete my health care directive.  Measure of Success: Health care directive will be completed and scanned into chart.  Supportive Steps to Achieve: Patient has attended a health care directive class, 10/24/17 informed of CPR vs intubation  Barriers: is awaiting to see her  to finalize document  Strengths: has care coordination, home care and excellent family support  Date to Achieve By: 12/31/18  Patient expressed understanding of goal: yes             #2 Healthy Eating (pt-stated)     Notes - Note created  10/30/2018  3:58 PM by  Behl, Melissa K, RN    Goal Statement: I will follow a low sodium diet.  Measure of Success: Patient will consistently eat a low sodium diet.  Supportive Steps to Achieve: RN CC will mail out low sodium diet ideas.  Family providing assistance.  Barriers: not always able to cook for self  Strengths: family support  Date to Achieve By: 12/30/18  Patient expressed understanding of goal: yes           Patient/Caregiver understanding: Expresses understanding    Outreach Frequency: 2 weeks  Future Appointments              In 2 days UC LAB  Health Lab, [facility]    In 2 days UC PFL C  Health Pulmonary Function Testing, [facility]    In 2 days 1, Uc Cv Device [facility], [facility]    In 2 days Franki Carrasquillo MD [facility], [facility]    In 1 week [place] [address] [facility]FUS[facility]    In 3 weeks PFT LAB [facility]    In 3 weeks Dea Acosta MD [facility]    In 3 weeks BK LAB [facility], [place]    In 4 weeks Karla Mabry, NP [facility] AT [facility], [facility] PSA CLIN    In 1 month [place] [address] [facility]    In 1 month Mario Davenport MD [facility], [place]    In 2 months Asia Steven PA-C [facility]    In 3 months [employer] PA-C [facility]      Plan: Patient will see cardiology on 11/21/18.  Clinic care coordinator will continue to follow.    Cintia Alves RN, CCM - Care Coordinator     11/19/2018    9:57 AM  [phone]

## 2018-11-19 NOTE — PROGRESS NOTES
Patient returned to her assisted living facility: [facility]. No post DC follow up call is needed.

## 2018-11-20 ENCOUNTER — TELEPHONE (OUTPATIENT)
Dept: CARDIOLOGY | Facility: CLINIC | Age: 83
End: 2018-11-20

## 2018-11-20 DIAGNOSIS — I50.30 HEART FAILURE WITH PRESERVED EJECTION FRACTION, NYHA CLASS I (H): ICD-10-CM

## 2018-11-20 RX ORDER — FUROSEMIDE 20 MG
TABLET ORAL
Qty: 120 TABLET | Refills: 3 | Status: ON HOLD | OUTPATIENT
Start: 2018-11-20 | End: 2018-12-04

## 2018-11-20 NOTE — TELEPHONE ENCOUNTER
Called Arjun back. He states his mom was discharged from hospital 2 days ago. Instructions to hold Spironolactone x2 days then talk to [facility] about resuming. She is still having intermittent dizziness when she turns her head side to side. This happens sitting as well as standing. Yesterday she was reaching for something in her freezer and she started to fall. Reportedly lost her balance, did not get dizzy at this time. Was able to lean into wall and slide down it. Her blood pressures have been ok in the 120s. Weight has been stable (arjun didn't know exact numbers) and swelling in ankles has gone down.   They are wondering what to do with Spironolactone and any further recommendations.     Lizett Zapata RN

## 2018-11-20 NOTE — TELEPHONE ENCOUNTER
[facility] Call Center    Phone Message    May a detailed message be left on voicemail: yes    Reason for Call: Other: Per call from PT's son PT was discharged from hospital over the weekend, was told to discontinue the spironolactone (ALDACTONE) 25 MG tablet for two days and call the [facility] in [place] to discuss plan.  PT's son is requesting for a call back to the above #      Action Taken: Message routed to:  [facility] [place] [facility]

## 2018-11-20 NOTE — TELEPHONE ENCOUNTER
"Discussed with ARTEM Lui then call Linda then also Arjun back.  Linda reaffirms she is having intermittent dizziness daily. She states her weight is \"down a little bit more today too, at 155.4lbs\".  Linda and Arjun verbalizes understanding and agrees with plan of care. Clau Sigala RN      Date: 11/20/2018  Time of Call: 5:32 PM  Diagnosis:  Heart failure  VORB - Ordering provider: ARTEM Lui  Order: Decrease lasix to 40/20. Continue to hold Spironolactone  Order received by: Clau Sigala RN   Follow-up/additional notes: Discussed with Linda and Arjun to call if Linda continues to have dizziness after this change, or if her ankle swelling or increasing SOB returns. Also let them know Rochelle to review case a little further and will call if she has additional orders.        "

## 2018-11-23 ENCOUNTER — TELEPHONE (OUTPATIENT)
Dept: UROLOGY | Facility: CLINIC | Age: 83
End: 2018-11-23

## 2018-11-23 DIAGNOSIS — N39.0 URINARY TRACT INFECTION: Primary | ICD-10-CM

## 2018-11-23 DIAGNOSIS — R82.90 NONSPECIFIC FINDING ON EXAMINATION OF URINE: Primary | ICD-10-CM

## 2018-11-23 DIAGNOSIS — N39.0 URINARY TRACT INFECTION: ICD-10-CM

## 2018-11-23 LAB
ALBUMIN UR-MCNC: NEGATIVE MG/DL
ALBUMIN UR-MCNC: NORMAL MG/DL
APPEARANCE UR: CLEAR
APPEARANCE UR: NORMAL
BACTERIA #/AREA URNS HPF: ABNORMAL /HPF
BILIRUB UR QL STRIP: NEGATIVE
BILIRUB UR QL STRIP: NORMAL
COLOR UR AUTO: NORMAL
COLOR UR AUTO: YELLOW
GLUCOSE UR STRIP-MCNC: NEGATIVE MG/DL
GLUCOSE UR STRIP-MCNC: NORMAL MG/DL
HGB UR QL STRIP: NEGATIVE
HGB UR QL STRIP: NORMAL
KETONES UR STRIP-MCNC: NEGATIVE MG/DL
KETONES UR STRIP-MCNC: NORMAL MG/DL
LEUKOCYTE ESTERASE UR QL STRIP: ABNORMAL
LEUKOCYTE ESTERASE UR QL STRIP: NORMAL
NITRATE UR QL: NEGATIVE
NITRATE UR QL: NORMAL
NON-SQ EPI CELLS #/AREA URNS LPF: ABNORMAL /LPF
NON-SQ EPI CELLS #/AREA URNS LPF: NORMAL /LPF
PH UR STRIP: 6 PH (ref 5–7)
PH UR STRIP: NORMAL PH (ref 5–7)
RBC #/AREA URNS AUTO: ABNORMAL /HPF
RBC #/AREA URNS AUTO: NORMAL /HPF (ref 0–2)
SOURCE: ABNORMAL
SOURCE: NORMAL
SP GR UR STRIP: <=1.005 (ref 1–1.03)
SP GR UR STRIP: NORMAL (ref 1–1.03)
UROBILINOGEN UR STRIP-ACNC: 0.2 EU/DL (ref 0.2–1)
UROBILINOGEN UR STRIP-ACNC: NORMAL EU/DL (ref 0.2–1)
WBC #/AREA URNS AUTO: ABNORMAL /HPF
WBC #/AREA URNS AUTO: NORMAL /HPF

## 2018-11-23 PROCEDURE — 87086 URINE CULTURE/COLONY COUNT: CPT | Performed by: PHYSICIAN ASSISTANT

## 2018-11-23 PROCEDURE — 81001 URINALYSIS AUTO W/SCOPE: CPT | Performed by: PHYSICIAN ASSISTANT

## 2018-11-23 NOTE — TELEPHONE ENCOUNTER
Health Call Center    Phone Message    May a detailed message be left on voicemail: yes    Reason for Call: Symptoms or Concerns     If patient has red-flag symptoms, warm transfer to triage line    Current symptom or concern: Possible UTI    Symptoms have been present for:  2 day- Patient is requesting an order be placed so she can go to the lab in [place] and pending results get medication before the weekend.      Has patient previously been seen for this? Yes    By Asia Steven    Are there any new or worsening symptoms? Yes- New symptoms for 2 days      Action Taken: Routed to Urology

## 2018-11-23 NOTE — TELEPHONE ENCOUNTER
Asia recommended no antibiotics until culture is back. She can have Pyridium 100 mg TID prn, #12 for comfort over the weekend.    Pt declined on the Pyridium, she does not want to take anymore medication if she does not have to. She will wait to see what the culture shows.    Jennifer Smith RN, BSN, PHN  [facility]  GI/Gen Surg/Hepatology Care Coordinator

## 2018-11-23 NOTE — TELEPHONE ENCOUNTER
Nurse updated patient that UA is back and does not look too bad but the culture is not back yet. She said she would wait for the culture results and Asia's recommendations.    Jennifer Smith RN, BSN, PHN  [facility]  GI/Gen Surg/Hepatology Care Coordinator

## 2018-11-23 NOTE — TELEPHONE ENCOUNTER
Nurse returned call and requested that the patient submit a UA/UC sample. She will go to [facility].    eJnnifer Smith RN, BSN, PHN  [facility]  GI/Gen Surg/Hepatology Care Coordinator

## 2018-11-24 LAB
BACTERIA SPEC CULT: NO GROWTH
SPECIMEN SOURCE: NORMAL

## 2018-11-26 ENCOUNTER — TELEPHONE (OUTPATIENT)
Dept: CARDIOLOGY | Facility: CLINIC | Age: 83
End: 2018-11-26

## 2018-11-26 ENCOUNTER — ALLIED HEALTH/NURSE VISIT (OUTPATIENT)
Dept: CARDIOLOGY | Facility: CLINIC | Age: 83
DRG: 291 | End: 2018-11-26
Attending: INTERNAL MEDICINE
Payer: MEDICARE

## 2018-11-26 ENCOUNTER — INFUSION THERAPY VISIT (OUTPATIENT)
Dept: INFUSION THERAPY | Facility: CLINIC | Age: 83
End: 2018-11-26
Payer: MEDICARE

## 2018-11-26 VITALS
DIASTOLIC BLOOD PRESSURE: 79 MMHG | SYSTOLIC BLOOD PRESSURE: 124 MMHG | WEIGHT: 160.4 LBS | OXYGEN SATURATION: 100 % | HEART RATE: 58 BPM | TEMPERATURE: 98.3 F | BODY MASS INDEX: 27.53 KG/M2 | RESPIRATION RATE: 18 BRPM

## 2018-11-26 DIAGNOSIS — M05.79 RHEUMATOID ARTHRITIS INVOLVING MULTIPLE SITES WITH POSITIVE RHEUMATOID FACTOR (H): Primary | ICD-10-CM

## 2018-11-26 DIAGNOSIS — I49.5 SICK SINUS SYNDROME (H): Primary | ICD-10-CM

## 2018-11-26 DIAGNOSIS — M81.0 AGE-RELATED OSTEOPOROSIS WITHOUT CURRENT PATHOLOGICAL FRACTURE: ICD-10-CM

## 2018-11-26 DIAGNOSIS — N18.30 CKD (CHRONIC KIDNEY DISEASE) STAGE 3, GFR 30-59 ML/MIN (H): ICD-10-CM

## 2018-11-26 PROCEDURE — 99207 ZZC NO CHARGE LOS: CPT

## 2018-11-26 PROCEDURE — 96365 THER/PROPH/DIAG IV INF INIT: CPT | Performed by: INTERNAL MEDICINE

## 2018-11-26 PROCEDURE — 4B02XSZ MEASUREMENT OF CARDIAC PACEMAKER, EXTERNAL APPROACH: ICD-10-PCS | Performed by: INTERNAL MEDICINE

## 2018-11-26 PROCEDURE — 93294 REM INTERROG EVL PM/LDLS PM: CPT | Performed by: INTERNAL MEDICINE

## 2018-11-26 PROCEDURE — 96372 THER/PROPH/DIAG INJ SC/IM: CPT | Mod: 59 | Performed by: INTERNAL MEDICINE

## 2018-11-26 PROCEDURE — 93296 REM INTERROG EVL PM/IDS: CPT | Mod: ZF

## 2018-11-26 RX ADMIN — Medication 250 ML: at 13:49

## 2018-11-26 ASSESSMENT — PAIN SCALES - GENERAL: PAINLEVEL: MILD PAIN (2)
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
## 2018-11-26 NOTE — MR AVS SNAPSHOT
After Visit Summary   11/26/2018    Linda Spicer    MRN: [id number]           Patient Information     Date Of Birth          6/13/1931        Visit Information        Provider Department      11/26/2018 1:00 PM [address]        Today's Diagnoses     Rheumatoid arthritis involving multiple sites with positive rheumatoid factor (H)    -  1    Age-related osteoporosis without current pathological fracture        CKD (chronic kidney disease) stage 3, GFR 30-59 ml/min (H)           Follow-ups after your visit        Your next 10 appointments already scheduled     Nov 27, 2018 10:15 AM CST   Return Visit with SUNNI Wagner CNP   [facility] ([facility])    [address] [place] [id number]   [phone]            Dec 14, 2018  9:45 AM CST   LAB with  LAB   [facility] ([facility])    [address] [place] [id number] [phone]           Please do not eat 10-12 hours before your appointment if you are coming in fasting for labs on lipids, cholesterol, or glucose (sugar). This does not apply to pregnant women. Water, hot tea and black coffee (with nothing added) are okay. Do not drink other fluids, diet soda or chew gum.            Dec 17, 2018  9:30 AM CST   Core Return with Karla Mabry NP   [facility] ([facility])    [address] [place] [address]   [phone]            Dec 19, 2018  2:15 PM CST   Lab with  LAB   [facility] ([facility])    [address] [place] [id number]   [phone]            Dec 19, 2018  2:30 PM CST   FULL PULMONARY FUNCTION with  PFL D    [facility] ([facility])    [address] [place] [address]  Floor  [facility] [id number]   [phone]            Dec 19, 2018  4:00 PM CST   (Arrive by 3:45 PM)   Pacemaker Check with Uc Cv Device 1   [facility] ([facility])    [address]  3rd Floor  [id number]               Dec 19, 2018  4:30 PM CST   (Arrive by 4:15 PM)   RETURN ARRHYTHMIA with Franki Carrasquillo MD   [facility] ([facility])    [address]  [address]  [facility] [address] [phone]            Dec 27, 2018  9:00 AM CST   Level 1 with BAY  INFUSION   [facility]    [address] [place] [id number] [phone]            Jim 15, 2019  2:40 PM CST   Return Visit with Mario Davenpotr MD   [facility])    [address] [id number] [phone]            Jan 24, 2019 10:00 AM CST   Level 1 with BAY  INFUSION   [facility] ([facility])    [id number] [place] [id number] [phone]              Who to contact     If you have questions or need follow up information about today's clinic visit or your schedule please contact [facility] directly at [phone].  Normal or non-critical lab and imaging results will be communicated to you by MyChart, letter or phone within 4 business days after the clinic has received the results. If you do not hear from us within 7 days, please contact the clinic through [employer] or phone. If you have a critical or abnormal lab result, we will notify you by phone as soon as possible.  Submit refill requests through [employer] or call your pharmacy and they will forward the refill request to us. Please allow 3 business days for your refill to be completed.          Additional Information About Your Visit        [employer] Information     [employer] gives you secure access to your  electronic health record. If you see a primary care provider, you can also send messages to your care team and make appointments. If you have questions, please call your primary care clinic.  If you do not have a primary care provider, please call [phone] and they will assist you.      [employer] is an electronic gateway that provides easy, online access to your medical records. With [employer], you can request a clinic appointment, read your test results, renew a prescription or communicate with your care team.     To access your existing account, please contact your [facility] or call [phone] for assistance.        Care EveryWhere ID     This is your Care EveryWhere ID. This could be used by other organizations to access your [place] medical records  [id number]        Your Vitals Were     Pulse Temperature Respirations Last Period Pulse Oximetry BMI (Body Mass Index)    58 98.3  F (36.8  C) (Oral) 18 (LMP Unknown) 100% 27.53 kg/m2       Blood Pressure from Last 3 Encounters:   11/26/18 124/79   11/18/18 165/87   11/01/18 125/77    Weight from Last 3 Encounters:   11/26/18 72.8 kg (160 lb 6.4 oz)   11/18/18 71.1 kg (156 lb 12.8 oz)   11/01/18 73.1 kg (161 lb 1 oz)              Today, you had the following     No orders found for display       Primary Care Provider Office Phone # Fax #    Tre Lockett [phone]       [address] [facility] [address]        Goals        General    #1 I will complete my health care directive. (pt-stated)     Notes - Note edited  8/21/2018 10:39 AM by Behl, Melissa K, RN    Goal Statement: I will complete my health care directive.  Measure of Success: Health care directive will be completed and scanned into chart.  Supportive Steps to Achieve: Patient has attended a health care directive class, 10/24/17 informed of CPR vs intubation  Barriers: is awaiting to see her  to finalize document  Strengths: has care  coordination, home care and excellent family support  Date to Achieve By: 12/31/18  Patient expressed understanding of goal: yes             #2 Healthy Eating (pt-stated)     Notes - Note created  10/30/2018  3:58 PM by Behl, Melissa K, RN    Goal Statement: I will follow a low sodium diet.  Measure of Success: Patient will consistently eat a low sodium diet.  Supportive Steps to Achieve: RN CC will mail out low sodium diet ideas.  Family providing assistance.  Barriers: not always able to cook for self  Strengths: family support  Date to Achieve By: 12/30/18  Patient expressed understanding of goal: yes           Equal Access to Services     [facility]: Hadii rakesh schuster hadasho Soomaali, waaxda luqadaha, qaybta kaalmada ana, zahraa moss . So [facility] [phone].    ATENCIÓN: Si habla español, tiene a merchant disposición servicios gratuitos de asistencia lingüística. Llame al [phone].    We comply with applicable federal civil rights laws and [place] laws. We do not discriminate on the basis of race, color, national origin, age, disability, sex, sexual orientation, or gender identity.            Thank you!     Thank you for choosing [facility]  for your care. Our goal is always to provide you with excellent care. Hearing back from our patients is one way we can continue to improve our services. Please take a few minutes to complete the written survey that you may receive in the mail after your visit with us. Thank you!             Your Updated Medication List - Protect others around you: Learn how to safely use, store and throw away your medicines at www.disposemymeds.org.          This list is accurate as of 11/26/18  3:09 PM.  Always use your most recent med list.                   Brand Name Dispense Instructions for use Diagnosis    ACETAMINOPHEN PO      Take 1,000 mg by mouth 2 times daily as needed        albuterol (2.5 MG/3ML) 0.083% neb solution     PROVENTIL     Take 1 vial by nebulization every 6 hours as needed for shortness of breath / dyspnea or wheezing        amiodarone 200 MG tablet    PACERONE/CODARONE    120 tablet    1st week: 400mg twice a day. 2nd week: 200mg twice a day. Then 200mg daily    Paroxysmal atrial fibrillation (H)       apixaban ANTICOAGULANT 2.5 MG tablet    ELIQUIS    180 tablet    Take 1 tablet (2.5 mg) by mouth 2 times daily    Atrial flutter, paroxysmal (H)       azaTHIOprine 50 MG tablet    IMURAN    90 tablet    Take 1 tablet (50 mg) by mouth daily    Rheumatoid arthritis involving multiple sites with positive rheumatoid factor (H)       Blood Pressure Monitor Kit     1 kit    1 kit daily as needed    CHF (congestive heart failure) (H)       calcium carbonate 600 mg-vitamin D 400 units 600-400 MG-UNIT per tablet    CALTRATE     Take 1 tablet by mouth 2 times daily        carvedilol 6.25 MG tablet    COREG    60 tablet    Take 1 tablet (6.25 mg) by mouth 2 times daily (with meals)    Heart failure with preserved ejection fraction, NYHA class I (H), Benign essential hypertension       COMPOUNDED NON-CONTROLLED SUBSTANCE - PHARMACY TO MIX COMPOUNDED MEDICATION    CMPD RX    30 g    Estriol 1mg/gram. Place 1 gram vaginally daily for two weeks, then vaginally twice weekly after.    Atrophic vaginitis       fish oil-omega-3 fatty acids 1000 MG capsule      Take 2 g by mouth daily. 2 capsules daily        folic acid 1 MG tablet    FOLVITE    90 tablet    Take 1 tablet (1 mg) by mouth daily    Oral aphthae       furosemide 20 MG tablet    LASIX    120 tablet    Take 40mg in the AM and 20mg in the PM    Heart failure with preserved ejection fraction, NYHA class I (H)       GENTEAL MILD OP      Apply  to eye daily.        hydrocortisone 2.5 % cream     30 g    Apply BID to affected region(s) for 7-10 days.    Rash       levothyroxine 75 MCG tablet    SYNTHROID/LEVOTHROID    90 tablet    TAKE ONE TABLET BY MOUTH ONCE DAILY     Hypothyroidism, unspecified type       nitroGLYcerin 0.4 MG sublingual tablet    NITROSTAT    25 tablet    Place 1 tablet (0.4 mg) under the tongue every 5 minutes as needed for chest pain    Chest pain       * order for DME     1 Box    Equipment being ordered: Dispense face mask. Mrs. Spicer is immunosuppressed due to rheumatoid arthritis.    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 Use with Albuterol.    Hypoxia       order for DME     1 each    Equipment being ordered: Dispense baffle, for use with nebulizer.    Pneumonia of left upper lobe due to Mycoplasma pneumoniae       * order for DME     1 each    Dispense SP Walker-small    Pain of toe of right foot, Status post bunionectomy       ORENCIA IV      Inject into the vein every 28 days        PRESERVISION AREDS PO      Take 1 tablet by mouth daily        ranibizumab 0.3 MG/0.05ML Soln    LUCENTIS     0.3 mg by Intravitreal route Every 6 weeks        ranitidine 150 MG tablet    ZANTAC    60 tablet    Take 1 tablet (150 mg) by mouth 2 times daily    Gastroesophageal reflux disease without esophagitis       REFRESH P.M. Oint      Apply  to eye. Daily at bedtime        saccharomyces boulardii 250 MG capsule    FLORASTOR     Take 250 mg by mouth daily        senna-docusate 8.6-50 MG per tablet    SENOKOT-S;PERICOLACE    120 tablet    Take 2 tablets by mouth At Bedtime Hold if diarrhea occurs.    Constipation, chronic       spironolactone 25 MG tablet    ALDACTONE    30 tablet    Take 0.5 tablets (12.5 mg) by mouth daily    Heart failure with preserved ejection fraction, NYHA class I (H)       trimethoprim 100 MG tablet    TRIMPEX    45 tablet    Take 0.5 tablets (50 mg) by mouth daily    Recurrent UTI       VITAMIN C PO      Take 500 mg by mouth daily        ZYRTEC ALLERGY 10 MG tablet   Generic drug:  cetirizine      Take 10 mg by mouth At Bedtime        *  Notice:  This list has 3 medication(s) that are the same as other medications prescribed for you. Read the directions carefully, and ask your doctor or other care provider to review them with you.

## 2018-11-26 NOTE — PROGRESS NOTES
Infusion Nursing Note:  Linda Spicer presents today for Orencia & Prolia.    Patient seen by provider today: No   present during visit today: Not Applicable.    Note: Call placed to Dr. Davenport regarding the use of prophylactic antibiotics. OK to proceed with Orencia as planned.     Intravenous Access:  Peripheral IV placed.    Treatment Conditions:  Biological Infusion Checklist:    ~~~ NOTE: If the patient answers yes to any of the questions below, hold the infusion and contact ordering provider or on-call provider.    1. Have you recently had an elevated temperature, fever, chills, productive cough, coughing for 3 weeks or longer or hemoptysis,  abnormal vital signs, night sweats,  chest pain or have you noticed a decrease in your appetite, unexplained weight loss or fatigue? No  2. Do you have any open wounds or new incisions? No  3. Do you have any recent or upcoming hospitalizations, surgeries or dental procedures? Yes, in hospital for A- fib 11/17 & 11/18  4. Do you currently have or recently have had any signs of illness or infection or are you on any antibiotics? Yes, on on Triethoprim for yeast infection prevention  5. Have you had any new, sudden or worsening abdominal pain? No  6. Have you or anyone in your household received a live vaccination in the past 4 weeks? Please note:  No live vaccines while on biologic/chemotherapy until 6 months after the last treatment.  Patient can receive the flu vaccine (shot only) and the pneumovax.  It is optimal for the patient to get these vaccines mid cycle, but they can be given at any time as long as it is not on the day of the infusion. No  7. Have you recently been diagnosed with any new nervous system diseases (ie. Multiple sclerosis, Guillain [place], seizures, neurological changes) or cancer diagnosis? Are you on any form of radiation or chemotherapy? No  8. Are you pregnant or breast feeding or do you have plans of pregnancy in the future?  No  9. Have you been having any signs of worsening depression or suicidal ideations?  (benlysta only) No  10. Have there been any other new onset medical symptoms? No        Post Infusion Assessment:  Patient tolerated infusion without incident.  Patient tolerated injection without incident.  No evidence of extravasations.  Access discontinued per protocol.  Biologic Infusion Post Education: Call the triage nurse at your clinic or seek medical attention if you have chills and/or temperature greater than or equal to 100.5, uncontrolled nausea/vomiting, diarrhea, constipation, dizziness, shortness of breath, chest pain, heart palpitations, weakness or any other new or concerning symptoms, questions or concerns.  You cannot have any live virus vaccines prior to or during treatment or up to 6 months post infusion.  If you have an upcoming surgery, medical procedure or dental procedure during treatment, this should be discussed with your ordering physician and your surgeon/dentist.  If you are having any concerning symptom, if you are unsure if you should get your next infusion or wish to speak to a provider before your next infusion, please call your care coordinator or triage nurse at your clinic to notify them so we can adequately serve you.    Discharge Plan:   Discharge instructions reviewed with: Patient and Family.  Patient and/or family verbalized understanding of discharge instructions and all questions answered.  Patient discharged in stable condition accompanied by: son.  Departure Mode: Ambulatory.    Lacey Granger RN

## 2018-11-26 NOTE — TELEPHONE ENCOUNTER
Updated by CORE RN re: patient symptoms and possible AF contributing. Per remote transmission and device RN: AF for last 6 days, vent rate 62bpm.     Reviewed with Minna Gallagher NP: not suspicious it is AF, as she reports symptoms only over past 2 days. Possibly pulmonary related. Can see patient tomorrow in [place] for further evaluation.      Spoke to patient. Agree with CORE RN that patient is audibly SOB on the phone. Made appt in [place] to see   Minna Gallagher NP on 11/17/18 at 1015. If symptoms worsen throughout the day, should see care in ER.    Patient verbalized understanding and is in agreement to the plan.

## 2018-11-26 NOTE — PROGRESS NOTES
Preliminary Device Interrogation Results.  Final physician signed paceart report to be scanned and attached.    Remote pacemaker transmission received and reviewed.  Device transmission sent per MD orders.  Patient has a Medtronic dual lead pacemaker.  Normal pacemaker function.  4 AT/AF episodes recorded since 11/17/18 - < 1 min - > 6 days in duration.  AF burden = 74.3%.  Patient is taking Eliquis.  No VHR episodes recorded.  Presenting EGM = AF with  @ 62 bpm.  AP = 24.5%.   = 63%.  Estimated battery longevity to ZENAIDA = 7 years.  Karol Perez RN notified of interrogation results.    Remote pacemaker transmission

## 2018-11-26 NOTE — TELEPHONE ENCOUNTER
11/23/18 urine culture negative. Asia Steven PA-C released results to my chart and patient read via my chart today. Will close encounter at this time.    Sena Chapman RN, BSN

## 2018-11-26 NOTE — TELEPHONE ENCOUNTER
Health Call Center    Phone Message    May a detailed message be left on voicemail: yes    Reason for Call: Symptoms or Concerns     If patient has red-flag symptoms, warm transfer to triage line    Current symptom or concern: Elevated Blood pressure 152/105 11.25.18 @ 4:30pm; 145/74 @ 9:45pm 11.25.18.  This morning 130/112 8:15am.  Does not feel well.    Symptoms have been present for:  2 day(s)    Has patient previously been seen for this? No        Are there any new or worsening symptoms? No      Action Taken: Message routed to:  [facility] ([facility] cardiology

## 2018-11-26 NOTE — MR AVS SNAPSHOT
After Visit Summary   11/26/2018    Linda Spicer    MRN: [id number]           Patient Information     Date Of Birth          6/13/1931        Visit Information        Provider Department      11/26/2018 6:00 AM UC ICD REMOTE [facility]        Today's Diagnoses     Sick sinus syndrome (H)    -  1       Follow-ups after your visit        Your next 10 appointments already scheduled     Nov 27, 2018 10:15 AM CST   Return Visit with SUNNI Wagner CNP   [facility] PHYSICIANS HEART AT [facility] ([facility])    [address] [place] [address]   [phone]            Dec 14, 2018  9:45 AM CST   [facility] with [facility] LAB   [facility]    [address] [place] [address]   [phone]           Please do not eat 10-12 hours before your appointment if you are coming in fasting for labs on lipids, cholesterol, or glucose (sugar). This does not apply to pregnant women. Water, hot tea and black coffee (with nothing added) are okay. Do not drink other fluids, diet soda or chew gum.            Dec 17, 2018  9:30 AM CST   Core Return with Karla Mabry NP   [facility] PHYSICIANS HEART AT [facility] ([facility])    [address] [place] [address]   [phone]            Dec 19, 2018  2:15 PM CST   Lab with HATTIE LAB    [facility] ([facility])    [address] [facility] [address]  [address] [facility] [address] [phone]            Dec 19, 2018  2:30 PM CST   FULL PULMONARY FUNCTION with  PFL D   [facility] Pulmonary Function Testing ([facility])    [address]  [address] [facility] [id number] [phone]            Dec 19, 2018  4:00 PM CST   (Arrive by 3:45 PM)   Pacemaker Check with  Cv Device 1   [facility] ([facility])    [address]  [facility] [address]  3rd Floor  [address]               Dec 19, 2018  4:30 PM CST   (Arrive by 4:15 PM)   RETURN ARRHYTHMIA with Franki Carrasquillo MD   [facility] ([facility])    [address]  [address]  [facility] [id number] [phone]            Dec 27, 2018  9:00 AM CST   Level 1 with BAY 9 INFUSION   [facility] ([facility])    [address] [place] [id number] [phone]            Jim 15, 2019  2:40 PM CST   Return Visit with Mario Davenport MD   [facility] ([facility])    [address] [facility] [address] [phone]            Jan 24, 2019 10:00 AM CST   Level 1 with BAY  INFUSION   [facility] ([facility])    [address] [place] [id number] [phone]              Who to contact     If you have questions or need follow up information about today's clinic visit or your schedule please contact [facility] directly at [phone].  Normal or non-critical lab and imaging results will be communicated to you by RFIDeashart, letter or phone within 4 business days after the clinic has received the results. If you do not hear from us within 7 days, please contact the clinic through RFIDeashart or phone. If you have a critical or abnormal lab result, we will notify you by phone as soon as possible.  Submit refill requests through SeatSwapr or call your pharmacy and they will forward the refill request to us. Please allow 3 business days for your refill to be completed.          Additional Information About Your Visit        RFIDeashart Information     SeatSwapr gives you secure access to your electronic health record. If you see a primary care provider, you can also send messages to your care team and make appointments. If you have questions, please call your primary care clinic.  If you do not have a primary care  provider, please call [phone] and they will assist you.        Care EveryWhere ID     This is your Care EveryWhere ID. This could be used by other organizations to access your [facility] medical records  [id number]        Your Vitals Were     Last Period                   (LMP Unknown)            Blood Pressure from Last 3 Encounters:   11/26/18 124/79   11/18/18 165/87   11/01/18 125/77    Weight from Last 3 Encounters:   11/26/18 72.8 kg (160 lb 6.4 oz)   11/18/18 71.1 kg (156 lb 12.8 oz)   11/01/18 73.1 kg (161 lb 1 oz)              We Performed the Following     INTERROGATION DEVICE EVAL REMOTE, PACER/ICD        Primary Care Provider Office Phone # Fax #    Tre Lockett [phone]       [id number] [place]  [facility] [address]        Goals        General    #1 I will complete my health care directive. (pt-stated)     Notes - Note edited  8/21/2018 10:39 AM by Behl, Melissa K, RN    Goal Statement: I will complete my health care directive.  Measure of Success: Health care directive will be completed and scanned into chart.  Supportive Steps to Achieve: Patient has attended a health care directive class, 10/24/17 informed of CPR vs intubation  Barriers: is awaiting to see her  to finalize document  Strengths: has care coordination, home care and excellent family support  Date to Achieve By: 12/31/18  Patient expressed understanding of goal: yes             #2 Healthy Eating (pt-stated)     Notes - Note created  10/30/2018  3:58 PM by Behl, Melissa K, RN    Goal Statement: I will follow a low sodium diet.  Measure of Success: Patient will consistently eat a low sodium diet.  Supportive Steps to Achieve: RN CC will mail out low sodium diet ideas.  Family providing assistance.  Barriers: not always able to cook for self  Strengths: family support  Date to Achieve By: 12/30/18  Patient expressed understanding of goal: yes           Equal Access to Services     [facility]: Paul  rakesh Goyal, wareidda luqadaha, qaybta kaalbrooke perez, zahraa chazin hayaacapri harrisnicolas damienarnulfo laVickiemelina brody. [place] [phone].    ATENCIÓN: Si habla jaskaran, tiene a merchant disposición servicios gratuitos de asistencia lingüística. Arsename al [phone].    We comply with applicable federal civil rights laws and [place] laws. We do not discriminate on the basis of race, color, national origin, age, disability, sex, sexual orientation, or gender identity.            Thank you!     Thank you for choosing [facility]  for your care. Our goal is always to provide you with excellent care. Hearing back from our patients is one way we can continue to improve our services. Please take a few minutes to complete the written survey that you may receive in the mail after your visit with us. Thank you!             Your Updated Medication List - Protect others around you: Learn how to safely use, store and throw away your medicines at www.disposemymeds.org.          This list is accurate as of 11/26/18  1:20 PM.  Always use your most recent med list.                   Brand Name Dispense Instructions for use Diagnosis    ACETAMINOPHEN PO      Take 1,000 mg by mouth 2 times daily as needed        albuterol (2.5 MG/3ML) 0.083% neb solution    PROVENTIL     Take 1 vial by nebulization every 6 hours as needed for shortness of breath / dyspnea or wheezing        amiodarone 200 MG tablet    PACERONE/CODARONE    120 tablet    1st week: 400mg twice a day. 2nd week: 200mg twice a day. Then 200mg daily    Paroxysmal atrial fibrillation (H)       apixaban ANTICOAGULANT 2.5 MG tablet    ELIQUIS    180 tablet    Take 1 tablet (2.5 mg) by mouth 2 times daily    Atrial flutter, paroxysmal (H)       azaTHIOprine 50 MG tablet    IMURAN    90 tablet    Take 1 tablet (50 mg) by mouth daily    Rheumatoid arthritis involving multiple sites with positive rheumatoid factor (H)       Blood Pressure Monitor Kit     1 kit    1 kit daily as  needed    CHF (congestive heart failure) (H)       calcium carbonate 600 mg-vitamin D 400 units 600-400 MG-UNIT per tablet    CALTRATE     Take 1 tablet by mouth 2 times daily        carvedilol 6.25 MG tablet    COREG    60 tablet    Take 1 tablet (6.25 mg) by mouth 2 times daily (with meals)    Heart failure with preserved ejection fraction, NYHA class I (H), Benign essential hypertension       COMPOUNDED NON-CONTROLLED SUBSTANCE - PHARMACY TO MIX COMPOUNDED MEDICATION    CMPD RX    30 g    Estriol 1mg/gram. Place 1 gram vaginally daily for two weeks, then vaginally twice weekly after.    Atrophic vaginitis       fish oil-omega-3 fatty acids 1000 MG capsule      Take 2 g by mouth daily. 2 capsules daily        folic acid 1 MG tablet    FOLVITE    90 tablet    Take 1 tablet (1 mg) by mouth daily    Oral aphthae       furosemide 20 MG tablet    LASIX    120 tablet    Take 40mg in the AM and 20mg in the PM    Heart failure with preserved ejection fraction, NYHA class I (H)       GENTEAL MILD OP      Apply  to eye daily.        hydrocortisone 2.5 % cream     30 g    Apply BID to affected region(s) for 7-10 days.    Rash       levothyroxine 75 MCG tablet    SYNTHROID/LEVOTHROID    90 tablet    TAKE ONE TABLET BY MOUTH ONCE DAILY    Hypothyroidism, unspecified type       nitroGLYcerin 0.4 MG sublingual tablet    NITROSTAT    25 tablet    Place 1 tablet (0.4 mg) under the tongue every 5 minutes as needed for chest pain    Chest pain       * order for DME     1 Box    Equipment being ordered: Dispense face mask. Mrs. Spicer is immunosuppressed due to rheumatoid arthritis.    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 Use with Albuterol.    Hypoxia       order for DME     1 each    Equipment being ordered: Dispense baffle, for use with nebulizer.    Pneumonia of left upper lobe due to Mycoplasma pneumoniae       * order  for DME     1 each    Dispense SP Walker-small    Pain of toe of right foot, Status post bunionectomy       ORENCIA IV      Inject into the vein every 28 days        PRESERVISION AREDS PO      Take 1 tablet by mouth daily        ranibizumab 0.3 MG/0.05ML Soln    LUCENTIS     0.3 mg by Intravitreal route Every 6 weeks        ranitidine 150 MG tablet    ZANTAC    60 tablet    Take 1 tablet (150 mg) by mouth 2 times daily    Gastroesophageal reflux disease without esophagitis       REFRESH P.M. Oint      Apply  to eye. Daily at bedtime        saccharomyces boulardii 250 MG capsule    FLORASTOR     Take 250 mg by mouth daily        senna-docusate 8.6-50 MG per tablet    SENOKOT-S;PERICOLACE    120 tablet    Take 2 tablets by mouth At Bedtime Hold if diarrhea occurs.    Constipation, chronic       spironolactone 25 MG tablet    ALDACTONE    30 tablet    Take 0.5 tablets (12.5 mg) by mouth daily    Heart failure with preserved ejection fraction, NYHA class I (H)       trimethoprim 100 MG tablet    TRIMPEX    45 tablet    Take 0.5 tablets (50 mg) by mouth daily    Recurrent UTI       VITAMIN C PO      Take 500 mg by mouth daily        ZYRTEC ALLERGY 10 MG tablet   Generic drug:  cetirizine      Take 10 mg by mouth At Bedtime        * Notice:  This list has 3 medication(s) that are the same as other medications prescribed for you. Read the directions carefully, and ask your doctor or other care provider to review them with you.

## 2018-11-27 ENCOUNTER — OFFICE VISIT (OUTPATIENT)
Dept: CARDIOLOGY | Facility: CLINIC | Age: 83
End: 2018-11-27
Payer: MEDICARE

## 2018-11-27 VITALS
SYSTOLIC BLOOD PRESSURE: 131 MMHG | OXYGEN SATURATION: 99 % | HEART RATE: 62 BPM | WEIGHT: 161 LBS | DIASTOLIC BLOOD PRESSURE: 78 MMHG | BODY MASS INDEX: 27.64 KG/M2

## 2018-11-27 DIAGNOSIS — I48.0 PAROXYSMAL ATRIAL FIBRILLATION (H): Primary | ICD-10-CM

## 2018-11-27 DIAGNOSIS — I34.0 MITRAL VALVE INSUFFICIENCY, UNSPECIFIED ETIOLOGY: ICD-10-CM

## 2018-11-27 DIAGNOSIS — I50.30 HEART FAILURE WITH PRESERVED EJECTION FRACTION, NYHA CLASS I (H): ICD-10-CM

## 2018-11-27 DIAGNOSIS — I10 BENIGN ESSENTIAL HYPERTENSION: ICD-10-CM

## 2018-11-27 PROCEDURE — 93000 ELECTROCARDIOGRAM COMPLETE: CPT | Performed by: NURSE PRACTITIONER

## 2018-11-27 PROCEDURE — 99214 OFFICE O/P EST MOD 30 MIN: CPT | Performed by: NURSE PRACTITIONER

## 2018-11-27 RX ORDER — CARVEDILOL 3.12 MG/1
3.12 TABLET ORAL 2 TIMES DAILY WITH MEALS
Qty: 60 TABLET | Refills: 11 | Status: ON HOLD | OUTPATIENT
Start: 2018-11-27 | End: 2018-12-04

## 2018-11-27 NOTE — TELEPHONE ENCOUNTER
Routing to provider to review and approve requested orders below. Patient had change of status,RN unable to approve without provider review first. Patient was recently in hospital and went to short term TCU after discharge. Records in [facility]. Patient is now home and home care asking for resumption of care and continued physician orders.     Shayla Lee RN  [facility] Triage

## 2018-11-27 NOTE — LETTER
"11/27/2018      RE: Linda Spicer  [address]  [facility] [address]       Dear Colleague,    Thank you for the opportunity to participate in the care of your patient, Linda Spicer, at the [facility]. Please see a copy of my visit note below.        Heart Care - Clinical Cardiac Electrophysiology       HPI: Linda Spicer is an 87 year old female who presents with her daughter for follow up of PAF/AFL and dizziness.  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 Propafenone failed to maintain SR, Multaq was cost prohibitive. She has limited AAT options given variable renal function (prefer to avoid Sotalol or dofetilide), therefore, an amiodarone load was initiated on 9/19/2018. She has not been a good candidate for AF ablation due to her high risk of stroke or bleed r/t her advanced age. She was hospitalized 10/2018 for a HF exacerbation, was diuresed, and underwent a successful DCCV on 10/11/2018. She was hospitalized 11/17-11/19 for near-syncope while on the toliet (thought to be vasovagal) with no evidence for arrhythmia on device, and, per Device report, she had been AF/AFL for several days prior without knowledge or symptoms.     Reviewed current interval:   - Echocardiogram 10/9/2018 normal LV function with EF 55-60%, mild pulmonary hypertension, and mild-moderate TI, mild-moderate MR   - YOLANDA 10/16/2018 showed progression to severe mitral insufficiency \"Multiple MR jets noted. There is blunted systolic forward flow in 2 of the pulmonary veins. MR volume is estimated at 34ml, however this likely underestimates MR due to multiple jets.\"   - Remote device check 11/26/2018: " "\"Patient has a Medtronic dual lead pacemaker.  Normal pacemaker function.  4 AT/AF episodes recorded since 11/17/18 - < 1 min - > 6 days in duration.  AF burden = 74.3%.  Patient is taking Eliquis.  No VHR episodes recorded.  Presenting EGM = AF with  @ 62 bpm.  AP = 24.5%.   = 63%.  Estimated battery longevity to ZENAIDA = 7 years.\"   - Current cardiac medications: amiodarone 200 mg daily, apixaban 2.5 mg daily, carvedilol 6.25 mg daily, furosemide 40 mg in the am and 20 mg in the pm, nitroglycerin PRN, spironolactone 12.5 mg daily  - Presenting EKG personally reviewed tracings: AFL/ V-paced, Vent rate 61, VT interval na ms, QRS duration 170 ms, QTc 509 ms.    Current Interval history:   States that on 11/20 she started to feel increased but intermittent dizziness, fatigue, dyspnea with activity (difficult to walk and with transferring self), dyspnea with talking. Chart review shows that she called in on that day.  She states that she feels better if she is resting but still will have some dyspnea at rest.  Denies pedal edema. Weight has been stable. Eating a low sodium diet. Patient states that she never misses doses of medication.  States that activity level is very light, limited by activity intolerance.  Sleeps with one pillow under head, able to sleep flat without problems.  Denies chest pain or pressure, syncope, palpitations, orthopnea, PND, abdominal edema, pedal edema, or claudication.  Denies easy bleeding, hematuria, hematochezia, and epistaxis. Denies signs/symptoms of stroke such as visual disturbance, difficulty speaking, facial drooping, confusion, problems with gait, or any new numbness or weakness.    Current Outpatient Prescriptions   Medication Sig Dispense Refill     Abatacept (ORENCIA IV) Inject into the vein every 28 days       ACETAMINOPHEN PO Take 1,000 mg by mouth 2 times daily as needed        albuterol (2.5 MG/3ML) 0.083% neb solution Take 1 vial by nebulization every 6 hours as needed " for shortness of breath / dyspnea or wheezing       amiodarone (PACERONE/CODARONE) 200 MG tablet 1st week: 400mg twice a day. 2nd week: 200mg twice a day. Then 200mg daily 120 tablet 3     apixaban ANTICOAGULANT (ELIQUIS) 2.5 MG tablet Take 1 tablet (2.5 mg) by mouth 2 times daily 180 tablet 3     Artificial Tear Ointment (REFRESH P.M.) OINT Apply  to eye. Daily at bedtime          Ascorbic Acid (VITAMIN C PO) Take 500 mg by mouth daily        azaTHIOprine (IMURAN) 50 MG tablet Take 1 tablet (50 mg) by mouth daily 90 tablet 2     calcium-vitamin D (CALTRATE) 600-400 MG-UNIT per tablet Take 1 tablet by mouth 2 times daily        carvedilol (COREG) 6.25 MG tablet Take 1 tablet (6.25 mg) by mouth 2 times daily (with meals) 60 tablet 1     cetirizine (ZYRTEC ALLERGY) 10 MG tablet Take 10 mg by mouth At Bedtime       COMPOUNDED NON-CONTROLLED SUBSTANCE (CMPD RX) - PHARMACY TO MIX COMPOUNDED MEDICATION Estriol 1mg/gram. Place 1 gram vaginally daily for two weeks, then vaginally twice weekly after. 30 g 6     fish oil-omega-3 fatty acids (FISH OIL) 1000 MG capsule Take 2 g by mouth daily. 2 capsules daily            folic acid (FOLVITE) 1 MG tablet Take 1 tablet (1 mg) by mouth daily 90 tablet 3     furosemide (LASIX) 20 MG tablet Take 40mg in the AM and 20mg in the  tablet 3     hydrocortisone 2.5 % cream Apply BID to affected region(s) for 7-10 days. 30 g 0     Hypromellose (GENTEAL MILD OP) Apply  to eye daily.       levothyroxine (SYNTHROID/LEVOTHROID) 75 MCG tablet TAKE ONE TABLET BY MOUTH ONCE DAILY 90 tablet 1     Multiple Vitamins-Minerals (PRESERVISION AREDS PO) Take 1 tablet by mouth daily        nitroglycerin (NITROSTAT) 0.4 MG SL tablet Place 1 tablet (0.4 mg) under the tongue every 5 minutes as needed for chest pain 25 tablet 0     ranibizumab (LUCENTIS) 0.3 MG/0.05ML SOLN 0.3 mg by Intravitreal route Every 6 weeks       ranitidine (ZANTAC) 150 MG tablet Take 1 tablet (150 mg) by mouth 2 times daily 60  tablet 5     saccharomyces boulardii (FLORASTOR) 250 MG capsule Take 250 mg by mouth daily       senna-docusate (SENOKOT-S;PERICOLACE) 8.6-50 MG per tablet Take 2 tablets by mouth At Bedtime Hold if diarrhea occurs. 120 tablet 11     spironolactone (ALDACTONE) 25 MG tablet Take 0.5 tablets (12.5 mg) by mouth daily 30 tablet 1     trimethoprim (TRIMPEX) 100 MG tablet Take 0.5 tablets (50 mg) by mouth daily 45 tablet 1     Blood Pressure Monitor KIT 1 kit daily as needed 1 kit 0     order for DME Dispense SP Walker-small 1 each 0     order for DME Equipment being ordered: Dispense baffle, for use with nebulizer. 1 each 0     order for DME Equipment being ordered: Nebulizer. Use with Albuterol. 1 each 0     order for DME Equipment being ordered: Dispense face mask.  Mrs. Spicer is immunosuppressed due to rheumatoid arthritis. [address]       Past Medical History:   Diagnosis Date     Adjustment disorder with anxious mood 5/18/2015     Advanced directives, counseling/discussion 8/30/2012    Patient states has Advance Directive and will bring in a copy to clinic. 8/30/2012   [facility] Medical Assistant \       Anemia 9/25/2015     Basal cell cancer      CHF (congestive heart failure) (H) 9/18/2014     CKD (chronic kidney disease) stage 3, GFR 30-59 ml/min (H) 9/29/2015     DDD (degenerative disc disease), lumbar 9/25/2015     Diffuse idiopathic pulmonary fibrosis (H) 5/6/2013     Encounter for palliative care 5/18/2015     History of blood transfusion 9/29/2015     Hypertension goal BP (blood pressure) < 140/90 9/7/2012     Hypothyroid 9/7/2012     Irritable bowel syndrome 10/29/2013     Macular degeneration      Macular degeneration, 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     Nondisplaced spiral fracture of shaft of humerus      Osteoporosis 8/13/2013     [employer] Update utility     RA (rheumatoid arthritis) (H) 5/7/2013     Rheumatic fever      Shingles      Spinal stenosis of lumbar region with neurogenic claudication 9/14/2015        Past Surgical History:   Procedure Laterality Date     ANESTHESIA CARDIOVERSION N/A 9/14/2018    Procedure: ANESTHESIA CARDIOVERSION;;  Surgeon: GENERIC ANESTHESIA PROVIDER;  Location: [facility] OR     ANESTHESIA CARDIOVERSION N/A 10/11/2018    Procedure: ANESTHESIA CARDIOVERSION;  Cardioversion;  Surgeon: GENERIC ANESTHESIA PROVIDER;  Location: [facility] OR     ANESTHESIA CARDIOVERSION N/A 10/16/2018    Procedure: Anesthesia Cardioversion ;  Surgeon: GENERIC ANESTHESIA PROVIDER;  Location: [facility] OR     APPENDECTOMY       BIOPSY      hemorrhoidectomy     ENT SURGERY      tonsillectomy     GYN SURGERY      3 D & C's     HYSTERECTOMY, PAP NO LONGER INDICATED       LAMINECTOMY LUMBAR ONE LEVEL N/A 10/13/2015    Procedure: LAMINECTOMY LUMBAR ONE LEVEL;  Surgeon: Fransico Toussaint MD;  Location: [facility] OR       Family History   Problem Relation Age of Onset     Hypertension Mother      Psychotic Disorder Father      Diabetes Son      Diabetes Daughter      Blood Disease Daughter        Social History   Substance Use Topics     Smoking status: Never Smoker     Smokeless tobacco: Never Used     Alcohol use Yes      Comment: rare wine        Allergies   Allergen Reactions     Cephalexin Hcl Diarrhea     Gabapentin Other (See Comments)     Dizzsiness     Naproxen GI Disturbance     Perfume      Macrobid [Nitrofurantoin Anhydrous]      Possibly related to lung disease      Seasonal Allergies      Sulfa Drugs      Throat swelling     Xanax [Alprazolam] Other (See Comments)     Dizziness      Ciprofloxacin Itching and Rash         ROS:   A complete review of systems was performed and is negative except as noted in the HPI.     Physical Examination:  Vitals: /78 (BP Location: Left arm, Patient Position: Chair, Cuff Size: Adult Large)  Pulse 62  Wt 73 kg (161 lb)  LMP  (LMP Unknown)  SpO2 99%  BMI 27.64 kg/m2  BMI= Body mass index is 27.64 kg/(m^2).    GENERAL APPEARANCE: healthy, alert, and no acute distress  HEENT: no icterus,  no xanthelasmas, normal pupil size and reaction, no cyanosis.  NECK: no asymmetry, no cervical bruits, no JVD   RESPIRATORY: basilar crackles, no use of accessory muscles, no retractions, respirations are unlabored, normal respiratory rate  CARDIOVASCULAR: regular rhythm, normal S1 with physiologic split S2, no S3 or S4 and no murmur, click or rub  GI:  no abdominal bruits, soft, non-tender  EXTREMITIES: no clubbing  NEURO: alert and oriented to person/place/time, normal speech, gait and affect  VASC: Radial and posterior tibialis pulses +2 and symmetric bilaterally. No cyanosis or edema.   SKIN: no ecchymoses, no rashes    Assessment and recommendations:    # Paroxysmal atrial fibrillation/atrial flutter: Discussed in detail with the patient management/treatment options for AF includin. Stroke Prophylaxis:  CHADSVASC=HF+, age++, female+, HTN+  5, corresponding to a 6.7% annual stroke / systemic emolism event rate. indicating need for long term oral anticoagulation. She is on renal dose of apixaban and has not had bleeding problems. Continue apixaban 2.5 mg twice daily.   2. Rate Control: She has noted worsening fatigue so will reduce Carvedilol to 3.125 mg twice daily.   3. Rhythm Control: Ablation is not recommended due to her advanced age. She has limited AAT options given variable renal function (prefer to avoid Sotalol or dofetilide), Multaq was cost prohibitive, and propafenone failed to maintain SR.  She was started on amiodarone 2018 and underwent a successful DCCV 10/11/2018, 10/16/208.  Device check 2018 showed 74% AF/AFL burden. The past 3 Device reports show that she had been AF/AFL for several days at a time without knowledge or symptoms so I now think it is unlikely that AF/AFL is the only source of her symptoms over the past couple of months. She also has rheumatoid pulmonary fibrosis which may be contributing to her worsening symptoms and as the amiodarone has not been successful in  maintaining SR will discontinue the amiodarone and get a PFT to assess for worsening lung function.     # HFpEF:  NYHA CLASS III: Fluid status euvolemic today   1. Aldosterone antagonist: not at this time    2. Diuretic for volume management: Continue furosemide 40 mg in the am and 20 mg in the pm.   3. No evidence to support use of ACEi/ARB, BB, or SCD prophylaxis (normal EF) in HFpEF   4. NSAID use: Contraindicated, reviewed with patient   5. BP: controlled   6. Maintain rate control or rhythm control in atrial fibrillation.   7. Recommend a low sodium diet (less than 2 grams/day)   8. Take daily weights and notify clinic if a weight gain of more than 3 lbs in a day or 5 lbs in a week.   9. Keep schedules follow ups with [facility].       #Severe mitral insufficiency: YOLANDA 10/16/2018 showed progression to severe mitral insufficiency   1. Symptoms have progressed so will refer to the valve clinic.     FOLLOW UP: Keep scheduled follow up with Dr. Carrasquillo or follow up sooner if needed for problems or symptoms.     Patient expresses understanding and agreement with the plan.    I appreciate the chance to help with Linda Spicer's care. Please let me know if you have any questions or concerns.    Minna MOELLER, CNP

## 2018-11-27 NOTE — MR AVS SNAPSHOT
After Visit Summary   11/27/2018    Lnida Spicer    MRN: [id number]           Patient Information     Date Of Birth          6/13/1931        Visit Information        Provider Department      11/27/2018 10:15 AM Minna Gallagher APRN CNP [facility] PHYSICIANS HEART AT [facility]        Today's Diagnoses     Paroxysmal atrial fibrillation (H)    -  1    Heart failure with preserved ejection fraction, NYHA class I (H)        Benign essential hypertension          Care Instructions    Thank you for coming to the [facility] Heart @ [facility]; please note the following instructions:    1. Stop the amiodarone now   2. Lung function test.  This is scheduled for Tuesday December 4th at 9:00 am.  Please check in at Desk D on the 2nd floor  3. Reduce to carvedilol to 3.125 mg twice daily.   4. Follow up as scheduled with MARIAH and Dr. Carrasquillo  5. Referral to valve clinic for your mitral valve.  The Valve clinic will call you with details of date/time and possible testing needed.  The Valve Clinic is at the [facility] ([address] [place] [address])    If you have any questions regarding your visit please contact your care team:     Cardiology  Telephone Number   Dina CHILD, RN  Lili GUILLERMO, RN   Marianne MCKINNEY, [place]   [phone]    *After hours: [phone]   For scheduling appts:     [phone] or    [phone] (select option 1)    *After hours: [phone]     For the Device Clinic (Pacemakers and ICD's)  RN's :  Kassandra Rose   During business hours: [phone]    *After business hours:  [phone] (select option 4)      Normal test result notifications will be released via [employer] or mailed within 7 business days.  All other test results, will be communicated via telephone once reviewed by your cardiologist.    If you need a medication refill please contact your pharmacy.  Please allow 3 business days for your refill to be completed.    As always,  thank you for trusting us with your health care needs!            Follow-ups after your visit        Your next 10 appointments already scheduled     Dec 04, 2018  9:00 AM CST   Office Visit with PFT LAB   [facility] ([facility])    [address] [facility] [id number] [phone]           Bring a current list of meds and any records pertaining to this visit. For Physicals, please bring immunization records and any forms needing to be filled out. Please arrive 10 minutes early to complete paperwork.            Dec 14, 2018  9:45 AM CST   LAB with BK LAB   [facility] ([facility])    [address] [facility] [address] [phone]           Please do not eat 10-12 hours before your appointment if you are coming in fasting for labs on lipids, cholesterol, or glucose (sugar). This does not apply to pregnant women. Water, hot tea and black coffee (with nothing added) are okay. Do not drink other fluids, diet soda or chew gum.            Dec 17, 2018  9:30 AM CST   Core Return with Karla Mabry NP   [facility]YS[facility] AT [facility] ([facility])    [address] [facility] [address] [facility] [address] [phone]            Dec 19, 2018  3:15 PM CST   Lab with UC LAB   [facility])    [address] [place] [address]  1st Floor  [facility] [id number] [phone]            Dec 19, 2018  4:00 PM CST   (Arrive by 3:45 PM)   Pacemaker Check with Uc Cv Device 1   [facility] ([facility])    [address] [place] [address]  3rd Floor  [id number]               Dec 19, 2018  4:30 PM CST   (Arrive by 4:15 PM)   RETURN ARRHYTHMIA with Franki Carrasquillo MD   [facility])    [address] [place] [address]  [address]  [facility] [id number] [phone]            Dec 27, 2018  9:00 AM CST    Level 1 with [facility] INFUSION   [facility] ([facility])    [address] [facility] [address]   [phone]            Jim 15, 2019  2:40 PM CST   Return Visit with Mario Davenport MD   [facility]    [address] [facility] [address]   [phone]            Jan 24, 2019 10:00 AM CST   Level 1 with [place] [facility] ([facility])    [address] [address] [place] [address]   [phone]            Feb 15, 2019 10:00 AM CST   Return Visit with Asia Steven PA-C   [facility] ([facility])    [id number] [place] [address]   [phone]              Who to contact     If you have questions or need follow up information about today's clinic visit or your schedule please contact [facility] directly at [phone].  Normal or non-critical lab and imaging results will be communicated to you by MyChart, letter or phone within 4 business days after the clinic has received the results. If you do not hear from us within 7 days, please contact the clinic through [employer] or phone. If you have a critical or abnormal lab result, we will notify you by phone as soon as possible.  Submit refill requests through [employer] or call your pharmacy and they will forward the refill request to us. Please allow 3 business days for your refill to be completed.          Additional Information About Your Visit        [employer] Information     [employer] gives you secure access to your electronic health record. If you see a primary care provider, you can also send messages to your care team and make appointments. If you have questions, please call your primary care clinic.  If you do not have a primary care provider, please call [phone] and they will assist you.         Care EveryWhere ID     This is your Care EveryWhere ID. This could be used by other organizations to access your [place] medical records  [id number]        Your Vitals Were     Pulse Last Period Pulse Oximetry BMI (Body Mass Index)          62 (LMP Unknown) 99% 27.64 kg/m2         Blood Pressure from Last 3 Encounters:   11/27/18 131/78   11/26/18 124/79   11/18/18 165/87    Weight from Last 3 Encounters:   11/27/18 73 kg (161 lb)   11/26/18 72.8 kg (160 lb 6.4 oz)   11/18/18 71.1 kg (156 lb 12.8 oz)              We Performed the Following     EKG 12-lead complete w/read - Clinics          Today's Medication Changes          These changes are accurate as of 11/27/18 12:05 PM.  If you have any questions, ask your nurse or doctor.               These medicines have changed or have updated prescriptions.        Dose/Directions    carvedilol 3.125 MG tablet   Commonly known as:  COREG   This may have changed:    - medication strength  - how much to take   Used for:  Heart failure with preserved ejection fraction, NYHA class I (H), Benign essential hypertension   Changed by:  Minna Gallagher APRN CNP        Dose:  3.125 mg   Take 1 tablet (3.125 mg) by mouth 2 times daily (with meals)   Quantity:  60 tablet   Refills:  11         Stop taking these medicines if you haven't already. Please contact your care team if you have questions.     amiodarone 200 MG tablet   Commonly known as:  PACERONE/CODARONE   Stopped by:  Minna Gallagher APRN CNP                Where to get your medicines      These medications were sent to [facility] [address] [place] [address] [address] [address]     Phone:  [phone]     carvedilol 3.125 MG tablet                Primary Care Provider Office Phone # Fax #    Tre Lockett [phone]       [address]  [facility] [address]        Goals        General    #1 I will complete my health care  directive. (pt-stated)     Notes - Note edited  8/21/2018 10:39 AM by Behl, Melissa K, RN    Goal Statement: I will complete my health care directive.  Measure of Success: Health care directive will be completed and scanned into chart.  Supportive Steps to Achieve: Patient has attended a health care directive class, 10/24/17 informed of CPR vs intubation  Barriers: is awaiting to see her  to finalize document  Strengths: has care coordination, home care and excellent family support  Date to Achieve By: 12/31/18  Patient expressed understanding of goal: yes             #2 Healthy Eating (pt-stated)     Notes - Note created  10/30/2018  3:58 PM by Behl, Melissa K, RN    Goal Statement: I will follow a low sodium diet.  Measure of Success: Patient will consistently eat a low sodium diet.  Supportive Steps to Achieve: RN CC will mail out low sodium diet ideas.  Family providing assistance.  Barriers: not always able to cook for self  Strengths: family support  Date to Achieve By: 12/30/18  Patient expressed understanding of goal: yes           Equal Access to Services     ITALO [facility] System[facility]AMERON : Hadii aad ku hadasho Soomaali, waaxda luqadaha, qaybta kaalmada adeegyada, waxay chazin hayemelina moss . So [facility] [phone].    ATENCIÓN: Si habla español, tiene a merchant disposición servicios gratuitos de asistencia lingüística. Llame al [phone].    We comply with applicable federal civil rights laws and [place] laws. We do not discriminate on the basis of race, color, national origin, age, disability, sex, sexual orientation, or gender identity.            Thank you!     Thank you for choosing [facility] PHYSICIANS HEART AT [facility]  for your care. Our goal is always to provide you with excellent care. Hearing back from our patients is one way we can continue to improve our services. Please take a few minutes to complete the written survey that you may receive in the mail after your visit with  us. Thank you!             Your Updated Medication List - Protect others around you: Learn how to safely use, store and throw away your medicines at www.disposemymeds.org.          This list is accurate as of 11/27/18 12:05 PM.  Always use your most recent med list.                   Brand Name Dispense Instructions for use Diagnosis    ACETAMINOPHEN PO      Take 1,000 mg by mouth 2 times daily as needed        albuterol (2.5 MG/3ML) 0.083% neb solution    PROVENTIL     Take 1 vial by nebulization every 6 hours as needed for shortness of breath / dyspnea or wheezing        apixaban ANTICOAGULANT 2.5 MG tablet    ELIQUIS    180 tablet    Take 1 tablet (2.5 mg) by mouth 2 times daily    Atrial flutter, paroxysmal (H)       azaTHIOprine 50 MG tablet    IMURAN    90 tablet    Take 1 tablet (50 mg) by mouth daily    Rheumatoid arthritis involving multiple sites with positive rheumatoid factor (H)       Blood Pressure Monitor Kit     1 kit    1 kit daily as needed    CHF (congestive heart failure) (H)       calcium carbonate 600 mg-vitamin D 400 units 600-400 MG-UNIT per tablet    CALTRATE     Take 1 tablet by mouth 2 times daily        carvedilol 3.125 MG tablet    COREG    60 tablet    Take 1 tablet (3.125 mg) by mouth 2 times daily (with meals)    Heart failure with preserved ejection fraction, NYHA class I (H), Benign essential hypertension       COMPOUNDED NON-CONTROLLED SUBSTANCE - PHARMACY TO MIX COMPOUNDED MEDICATION    CMPD RX    30 g    Estriol 1mg/gram. Place 1 gram vaginally daily for two weeks, then vaginally twice weekly after.    Atrophic vaginitis       fish oil-omega-3 fatty acids 1000 MG capsule      Take 2 g by mouth daily. 2 capsules daily        folic acid 1 MG tablet    FOLVITE    90 tablet    Take 1 tablet (1 mg) by mouth daily    Oral aphthae       furosemide 20 MG tablet    LASIX    120 tablet    Take 40mg in the AM and 20mg in the PM    Heart failure with preserved ejection fraction, NYHA class I  (H)       GENTEAL MILD OP      Apply  to eye daily.        hydrocortisone 2.5 % cream     30 g    Apply BID to affected region(s) for 7-10 days.    Rash       levothyroxine 75 MCG tablet    SYNTHROID/LEVOTHROID    90 tablet    TAKE ONE TABLET BY MOUTH ONCE DAILY    Hypothyroidism, unspecified type       nitroGLYcerin 0.4 MG sublingual tablet    NITROSTAT    25 tablet    Place 1 tablet (0.4 mg) under the tongue every 5 minutes as needed for chest pain    Chest pain       * order for DME     1 Box    Equipment being ordered: Dispense face mask. Mrs. Spicer is immunosuppressed due to rheumatoid arthritis.    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 Use with Albuterol.    Hypoxia       order for DME     1 each    Equipment being ordered: Dispense baffle, for use with nebulizer.    Pneumonia of left upper lobe due to Mycoplasma pneumoniae       * order for DME     1 each    Dispense SP Walker-small    Pain of toe of right foot, Status post bunionectomy       ORENCIA IV      Inject into the vein every 28 days        PRESERVISION AREDS PO      Take 1 tablet by mouth daily        ranibizumab 0.3 MG/0.05ML Soln    LUCENTIS     0.3 mg by Intravitreal route Every 6 weeks        ranitidine 150 MG tablet    ZANTAC    60 tablet    Take 1 tablet (150 mg) by mouth 2 times daily    Gastroesophageal reflux disease without esophagitis       REFRESH P.M. Oint      Apply  to eye. Daily at bedtime        saccharomyces boulardii 250 MG capsule    FLORASTOR     Take 250 mg by mouth daily        senna-docusate 8.6-50 MG tablet    SENOKOT-S/PERICOLACE    120 tablet    Take 2 tablets by mouth At Bedtime Hold if diarrhea occurs.    Constipation, chronic       spironolactone 25 MG tablet    ALDACTONE    30 tablet    Take 0.5 tablets (12.5 mg) by mouth daily    Heart failure with preserved ejection fraction, NYHA class I (H)        trimethoprim 100 MG tablet    TRIMPEX    45 tablet    Take 0.5 tablets (50 mg) by mouth daily    Recurrent UTI       VITAMIN C PO      Take 500 mg by mouth daily        ZYRTEC ALLERGY 10 MG tablet   Generic drug:  cetirizine      Take 10 mg by mouth At Bedtime        * Notice:  This list has 3 medication(s) that are the same as other medications prescribed for you. Read the directions carefully, and ask your doctor or other care provider to review them with you.

## 2018-11-27 NOTE — PATIENT INSTRUCTIONS
Thank you for coming to the [facility] @ [facility]; please note the following instructions:    1. Stop the amiodarone now   2. Lung function test.  This is scheduled for Tuesday December 4th at 9:00 am.  Please check in at Desk D on the 2nd floor  3. Reduce to carvedilol to 3.125 mg twice daily.   4. Follow up as scheduled with MARIAH and Dr. Carrasquillo  5. Referral to valve clinic for your mitral valve.  The Valve clinic will call you with details of date/time and possible testing needed.  The Valve Clinic is at the [facility] ([address] [place] [address])    If you have any questions regarding your visit please contact your care team:     Cardiology  Telephone Number   Dina CHILD, RN  Lili GUILLERMO, BRANDI MCKINNEY, [place]   [phone]    *After hours: [phone]   For scheduling appts:     [phone] or    [phone] (select option 1)    *After hours: [phone]     For the Device Clinic (Pacemakers and ICD's)  RN's :  Kassandra Rose   During business hours: [phone]    *After business hours:  [phone] (select option 4)      Normal test result notifications will be released via [employer] or mailed within 7 business days.  All other test results, will be communicated via telephone once reviewed by your cardiologist.    If you need a medication refill please contact your pharmacy.  Please allow 3 business days for your refill to be completed.    As always, thank you for trusting us with your health care needs!

## 2018-11-27 NOTE — PROGRESS NOTES
"    Heart Care - Clinical Cardiac Electrophysiology       HPI: Linda Spicer is an 87 year old female who presents with her daughter for follow up of PAF/AFL and dizziness.  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 Propafenone failed to maintain SR, Multaq was cost prohibitive. She has limited AAT options given variable renal function (prefer to avoid Sotalol or dofetilide), therefore, an amiodarone load was initiated on 9/19/2018. She has not been a good candidate for AF ablation due to her high risk of stroke or bleed r/t her advanced age. She was hospitalized 10/2018 for a HF exacerbation, was diuresed, and underwent a successful DCCV on 10/11/2018. She was hospitalized 11/17-11/19 for near-syncope while on the toliet (thought to be vasovagal) with no evidence for arrhythmia on device, and, per Device report, she had been AF/AFL for several days prior without knowledge or symptoms.     Reviewed current interval:   - Echocardiogram 10/9/2018 normal LV function with EF 55-60%, mild pulmonary hypertension, and mild-moderate TI, mild-moderate MR   - YOLANDA 10/16/2018 showed progression to severe mitral insufficiency \"Multiple MR jets noted. There is blunted systolic forward flow in 2 of the pulmonary veins. MR volume is estimated at 34ml, however this likely underestimates MR due to multiple jets.\"   - Remote device check 11/26/2018: \"Patient has a Medtronic dual lead pacemaker.  Normal pacemaker function.  4 AT/AF episodes recorded since 11/17/18 - < 1 min - > 6 days in duration.  AF burden = 74.3%.  Patient is taking Eliquis.  No VHR episodes recorded.  Presenting EGM = AF with  @ 62 bpm.  AP = 24.5%.   = 63%.  Estimated battery longevity to ZENAIDA = 7 years.\"   - Current cardiac medications: amiodarone " 200 mg daily, apixaban 2.5 mg daily, carvedilol 6.25 mg daily, furosemide 40 mg in the am and 20 mg in the pm, nitroglycerin PRN, spironolactone 12.5 mg daily  - Presenting EKG personally reviewed tracings: AFL/ V-paced, Vent rate 61, NE interval na ms, QRS duration 170 ms, QTc 509 ms.    Current Interval history:   States that on 11/20 she started to feel increased but intermittent dizziness, fatigue, dyspnea with activity (difficult to walk and with transferring self), dyspnea with talking. Chart review shows that she called in on that day.  She states that she feels better if she is resting but still will have some dyspnea at rest.  Denies pedal edema. Weight has been stable. Eating a low sodium diet. Patient states that she never misses doses of medication.  States that activity level is very light, limited by activity intolerance.  Sleeps with one pillow under head, able to sleep flat without problems.  Denies chest pain or pressure, syncope, palpitations, orthopnea, PND, abdominal edema, pedal edema, or claudication.  Denies easy bleeding, hematuria, hematochezia, and epistaxis. Denies signs/symptoms of stroke such as visual disturbance, difficulty speaking, facial drooping, confusion, problems with gait, or any new numbness or weakness.    Current Outpatient Prescriptions   Medication Sig Dispense Refill     Abatacept (ORENCIA IV) Inject into the vein every 28 days       ACETAMINOPHEN PO Take 1,000 mg by mouth 2 times daily as needed        albuterol (2.5 MG/3ML) 0.083% neb solution Take 1 vial by nebulization every 6 hours as needed for shortness of breath / dyspnea or wheezing       amiodarone (PACERONE/CODARONE) 200 MG tablet 1st week: 400mg twice a day. 2nd week: 200mg twice a day. Then 200mg daily 120 tablet 3     apixaban ANTICOAGULANT (ELIQUIS) 2.5 MG tablet Take 1 tablet (2.5 mg) by mouth 2 times daily 180 tablet 3     Artificial Tear Ointment (REFRESH P.M.) OINT Apply  to eye. Daily at bedtime           Ascorbic Acid (VITAMIN C PO) Take 500 mg by mouth daily        azaTHIOprine (IMURAN) 50 MG tablet Take 1 tablet (50 mg) by mouth daily 90 tablet 2     calcium-vitamin D (CALTRATE) 600-400 MG-UNIT per tablet Take 1 tablet by mouth 2 times daily        carvedilol (COREG) 6.25 MG tablet Take 1 tablet (6.25 mg) by mouth 2 times daily (with meals) 60 tablet 1     cetirizine (ZYRTEC ALLERGY) 10 MG tablet Take 10 mg by mouth At Bedtime       COMPOUNDED NON-CONTROLLED SUBSTANCE (CMPD RX) - PHARMACY TO MIX COMPOUNDED MEDICATION Estriol 1mg/gram. Place 1 gram vaginally daily for two weeks, then vaginally twice weekly after. 30 g 6     fish oil-omega-3 fatty acids (FISH OIL) 1000 MG capsule Take 2 g by mouth daily. 2 capsules daily            folic acid (FOLVITE) 1 MG tablet Take 1 tablet (1 mg) by mouth daily 90 tablet 3     furosemide (LASIX) 20 MG tablet Take 40mg in the AM and 20mg in the  tablet 3     hydrocortisone 2.5 % cream Apply BID to affected region(s) for 7-10 days. 30 g 0     Hypromellose (GENTEAL MILD OP) Apply  to eye daily.       levothyroxine (SYNTHROID/LEVOTHROID) 75 MCG tablet TAKE ONE TABLET BY MOUTH ONCE DAILY 90 tablet 1     Multiple Vitamins-Minerals (PRESERVISION AREDS PO) Take 1 tablet by mouth daily        nitroglycerin (NITROSTAT) 0.4 MG SL tablet Place 1 tablet (0.4 mg) under the tongue every 5 minutes as needed for chest pain 25 tablet 0     ranibizumab (LUCENTIS) 0.3 MG/0.05ML SOLN 0.3 mg by Intravitreal route Every 6 weeks       ranitidine (ZANTAC) 150 MG tablet Take 1 tablet (150 mg) by mouth 2 times daily 60 tablet 5     saccharomyces boulardii (FLORASTOR) 250 MG capsule Take 250 mg by mouth daily       senna-docusate (SENOKOT-S;PERICOLACE) 8.6-50 MG per tablet Take 2 tablets by mouth At Bedtime Hold if diarrhea occurs. 120 tablet 11     spironolactone (ALDACTONE) 25 MG tablet Take 0.5 tablets (12.5 mg) by mouth daily 30 tablet 1     trimethoprim (TRIMPEX) 100 MG tablet Take 0.5  tablets (50 mg) by mouth daily 45 tablet 1     Blood Pressure Monitor KIT 1 kit daily as needed 1 kit 0     order for DME Dispense SP Walker-small 1 each 0     order for DME Equipment being ordered: Dispense baffle, for use with nebulizer. 1 each 0     order for DME Equipment being ordered: Nebulizer. Use with Albuterol. 1 each 0     order for DME Equipment being ordered: Dispense face mask.  Mrs. Spicer is immunosuppressed due to rheumatoid arthritis. 1 [address]       Past Medical History:   Diagnosis Date     Adjustment disorder with anxious mood 5/18/2015     Advanced directives, counseling/discussion 8/30/2012    Patient states has Advance Directive and will bring in a copy to clinic. 8/30/2012   [facility] Medical Assistant \       Anemia 9/25/2015     Basal cell cancer      CHF (congestive heart failure) (H) 9/18/2014     CKD (chronic kidney disease) stage 3, GFR 30-59 ml/min (H) 9/29/2015     DDD (degenerative disc disease), lumbar 9/25/2015     Diffuse idiopathic pulmonary fibrosis (H) 5/6/2013     Encounter for palliative care 5/18/2015     History of blood transfusion 9/29/2015     Hypertension goal BP (blood pressure) < 140/90 9/7/2012     Hypothyroid 9/7/2012     Irritable bowel syndrome 10/29/2013     Macular degeneration      Macular degeneration, 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     Nondisplaced spiral fracture of shaft of humerus      Osteoporosis 8/13/2013     Im[employer] Update utility     RA (rheumatoid arthritis) (H) 5/7/2013     Rheumatic fever      Shingles      Spinal stenosis of lumbar region with neurogenic claudication 9/14/2015       Past Surgical History:   Procedure Laterality Date     ANESTHESIA CARDIOVERSION N/A 9/14/2018    Procedure: ANESTHESIA CARDIOVERSION;;  Surgeon: GENERIC ANESTHESIA PROVIDER;  Location: [facility] OR     ANESTHESIA CARDIOVERSION N/A 10/11/2018    Procedure: ANESTHESIA CARDIOVERSION;  Cardioversion;  Surgeon: GENERIC ANESTHESIA PROVIDER;  Location: [facility] OR     ANESTHESIA CARDIOVERSION  N/A 10/16/2018    Procedure: Anesthesia Cardioversion ;  Surgeon: GENERIC ANESTHESIA PROVIDER;  Location: [facility] OR     APPENDECTOMY       BIOPSY      hemorrhoidectomy     ENT SURGERY      tonsillectomy     GYN SURGERY      3 D & C's     HYSTERECTOMY, PAP NO LONGER INDICATED       LAMINECTOMY LUMBAR ONE LEVEL N/A 10/13/2015    Procedure: LAMINECTOMY LUMBAR ONE LEVEL;  Surgeon: Fransico Toussaint MD;  Location: [facility] OR       Family History   Problem Relation Age of Onset     Hypertension Mother      Psychotic Disorder Father      Diabetes Son      Diabetes Daughter      Blood Disease Daughter        Social History   Substance Use Topics     Smoking status: Never Smoker     Smokeless tobacco: Never Used     Alcohol use Yes      Comment: rare wine        Allergies   Allergen Reactions     Cephalexin Hcl Diarrhea     Gabapentin Other (See Comments)     Dizzsiness     Naproxen GI Disturbance     Perfume      Macrobid [Nitrofurantoin Anhydrous]      Possibly related to lung disease      Seasonal Allergies      Sulfa Drugs      Throat swelling     Xanax [Alprazolam] Other (See Comments)     Dizziness      Ciprofloxacin Itching and Rash         ROS:   A complete review of systems was performed and is negative except as noted in the HPI.     Physical Examination:  Vitals: /78 (BP Location: Left arm, Patient Position: Chair, Cuff Size: Adult Large)  Pulse 62  Wt 73 kg (161 lb)  LMP  (LMP Unknown)  SpO2 99%  BMI 27.64 kg/m2  BMI= Body mass index is 27.64 kg/(m^2).    GENERAL APPEARANCE: healthy, alert, and no acute distress  HEENT: no icterus, no xanthelasmas, normal pupil size and reaction, no cyanosis.  NECK: no asymmetry, no cervical bruits, no JVD   RESPIRATORY: basilar crackles, no use of accessory muscles, no retractions, respirations are unlabored, normal respiratory rate  CARDIOVASCULAR: regular rhythm, normal S1 with physiologic split S2, no S3 or S4 and no murmur, click or rub  GI:  no abdominal bruits,  soft, non-tender  EXTREMITIES: no clubbing  NEURO: alert and oriented to person/place/time, normal speech, gait and affect  VASC: Radial and posterior tibialis pulses +2 and symmetric bilaterally. No cyanosis or edema.   SKIN: no ecchymoses, no rashes    Assessment and recommendations:    # Paroxysmal atrial fibrillation/atrial flutter: Discussed in detail with the patient management/treatment options for AF includin. Stroke Prophylaxis:  CHADSVASC=HF+, age++, female+, HTN+  5, corresponding to a 6.7% annual stroke / systemic emolism event rate. indicating need for long term oral anticoagulation. She is on renal dose of apixaban and has not had bleeding problems. Continue apixaban 2.5 mg twice daily.   2. Rate Control: She has noted worsening fatigue so will reduce Carvedilol to 3.125 mg twice daily.   3. Rhythm Control: Ablation is not recommended due to her advanced age. She has limited AAT options given variable renal function (prefer to avoid Sotalol or dofetilide), Multaq was cost prohibitive, and propafenone failed to maintain SR.  She was started on amiodarone 2018 and underwent a successful DCCV 10/11/2018, 10/16/208.  Device check 2018 showed 74% AF/AFL burden. The past 3 Device reports show that she had been AF/AFL for several days at a time without knowledge or symptoms so I now think it is unlikely that AF/AFL is the only source of her symptoms over the past couple of months. She also has rheumatoid pulmonary fibrosis which may be contributing to her worsening symptoms and as the amiodarone has not been successful in maintaining SR will discontinue the amiodarone and get a PFT to assess for worsening lung function.     # HFpEF:  NYHA CLASS III: Fluid status euvolemic today   1. Aldosterone antagonist: not at this time    2. Diuretic for volume management: Continue furosemide 40 mg in the am and 20 mg in the pm.   3. No evidence to support use of ACEi/ARB, BB, or SCD prophylaxis (normal  EF) in HFpEF   4. NSAID use: Contraindicated, reviewed with patient   5. BP: controlled   6. Maintain rate control or rhythm control in atrial fibrillation.   7. Recommend a low sodium diet (less than 2 grams/day)   8. Take daily weights and notify clinic if a weight gain of more than 3 lbs in a day or 5 lbs in a week.   9. Keep schedules follow ups with [facility].       #Severe mitral insufficiency: YOLANDA 10/16/2018 showed progression to severe mitral insufficiency   1. Symptoms have progressed so will refer to the valve clinic.     FOLLOW UP: Keep scheduled follow up with Dr. Carrasquillo or follow up sooner if needed for problems or symptoms.     Patient expresses understanding and agreement with the plan.    I appreciate the chance to help with Linda Spicer's care. Please let me know if you have any questions or concerns.    Minna MOELLER, CNP

## 2018-11-27 NOTE — TELEPHONE ENCOUNTER
...Reason for Call: Request for an order or referral:    Order or referral being requested: Home Health care/ resumption of care and physicians orders.    Date needed: as soon as possible    Has the patient been seen by the PCP for this problem? YES    Additional comments:     Phone number Patient can be reached at:  Home number on file [phone] (home)    Best Time:  anytime    Can we leave a detailed message on this number?  YES    Call taken on 11/27/2018 at 12:16 PM by Leonarda Gonzalez

## 2018-11-27 NOTE — NURSING NOTE
"Chief Complaint   Patient presents with     RECHECK     HTN, SOB, back in Afib? Pt reports chest tightness, SOB, palpitations, lightheaded, dizziness, severe exhaustion.       Initial /78 (BP Location: Left arm, Patient Position: Chair, Cuff Size: Adult Large)  Pulse 62  Wt 73 kg (161 lb)  LMP  (LMP Unknown)  SpO2 99%  BMI 27.64 kg/m2 Estimated body mass index is 27.64 kg/(m^2) as calculated from the following:    Height as of 10/29/18: 1.626 m (5' 4\").    Weight as of this encounter: 73 kg (161 lb)..  BP completed using cuff size: harika Khan MA    "

## 2018-11-27 NOTE — TELEPHONE ENCOUNTER
Called Bhanu at [facility] back and he states needs the orders signed. They will fax a order for Dr Lockett to sign instead.    Usama Ching CMA

## 2018-11-28 ENCOUNTER — HOSPITAL ENCOUNTER (INPATIENT)
Facility: CLINIC | Age: 83
LOS: 6 days | Discharge: NURSING, REHAB OR LONG-TERM CARE | DRG: 291 | End: 2018-12-04
Attending: EMERGENCY MEDICINE | Admitting: INTERNAL MEDICINE
Payer: MEDICARE

## 2018-11-28 ENCOUNTER — TELEPHONE (OUTPATIENT)
Dept: CARDIOLOGY | Facility: CLINIC | Age: 83
End: 2018-11-28

## 2018-11-28 ENCOUNTER — APPOINTMENT (OUTPATIENT)
Dept: GENERAL RADIOLOGY | Facility: CLINIC | Age: 83
DRG: 291 | End: 2018-11-28
Attending: EMERGENCY MEDICINE
Payer: MEDICARE

## 2018-11-28 DIAGNOSIS — H35.30 MACULAR DEGENERATION OF LEFT EYE, UNSPECIFIED TYPE: Chronic | ICD-10-CM

## 2018-11-28 DIAGNOSIS — N39.0 RECURRENT UTI: ICD-10-CM

## 2018-11-28 DIAGNOSIS — R53.83 FATIGUE, UNSPECIFIED TYPE: ICD-10-CM

## 2018-11-28 DIAGNOSIS — M81.0 AGE-RELATED OSTEOPOROSIS WITHOUT CURRENT PATHOLOGICAL FRACTURE: ICD-10-CM

## 2018-11-28 DIAGNOSIS — I50.30 HEART FAILURE WITH PRESERVED EJECTION FRACTION, NYHA CLASS I (H): ICD-10-CM

## 2018-11-28 DIAGNOSIS — R55 NEAR SYNCOPE: ICD-10-CM

## 2018-11-28 DIAGNOSIS — M05.79 RHEUMATOID ARTHRITIS INVOLVING MULTIPLE SITES WITH POSITIVE RHEUMATOID FACTOR (H): Primary | ICD-10-CM

## 2018-11-28 DIAGNOSIS — E56.9 VITAMIN DEFICIENCY: ICD-10-CM

## 2018-11-28 DIAGNOSIS — E03.9 HYPOTHYROIDISM, UNSPECIFIED TYPE: ICD-10-CM

## 2018-11-28 DIAGNOSIS — I10 BENIGN ESSENTIAL HYPERTENSION: ICD-10-CM

## 2018-11-28 DIAGNOSIS — K21.9 GASTROESOPHAGEAL REFLUX DISEASE WITHOUT ESOPHAGITIS: ICD-10-CM

## 2018-11-28 DIAGNOSIS — K12.0 ORAL APHTHAE: ICD-10-CM

## 2018-11-28 DIAGNOSIS — I48.92 ATRIAL FLUTTER, UNSPECIFIED TYPE (H): ICD-10-CM

## 2018-11-28 DIAGNOSIS — R07.9 CHEST PAIN: ICD-10-CM

## 2018-11-28 DIAGNOSIS — H04.123 DRY EYES: ICD-10-CM

## 2018-11-28 DIAGNOSIS — K58.9 IRRITABLE BOWEL SYNDROME, UNSPECIFIED TYPE: ICD-10-CM

## 2018-11-28 DIAGNOSIS — I48.92 ATRIAL FLUTTER, PAROXYSMAL (H): ICD-10-CM

## 2018-11-28 DIAGNOSIS — R42 LIGHT HEADEDNESS: ICD-10-CM

## 2018-11-28 DIAGNOSIS — R07.9 ACUTE CHEST PAIN: ICD-10-CM

## 2018-11-28 DIAGNOSIS — J84.9 ILD (INTERSTITIAL LUNG DISEASE) (H): ICD-10-CM

## 2018-11-28 DIAGNOSIS — I50.30 CONGESTIVE HEART FAILURE WITH PRESERVED LV FUNCTION, NYHA CLASS 3 (H): Primary | ICD-10-CM

## 2018-11-28 DIAGNOSIS — J30.89 SEASONAL ALLERGIC RHINITIS DUE TO OTHER ALLERGIC TRIGGER: ICD-10-CM

## 2018-11-28 PROBLEM — I50.9 HEART FAILURE (H): Status: ACTIVE | Noted: 2018-11-28

## 2018-11-28 LAB
ALBUMIN SERPL-MCNC: 3.5 G/DL (ref 3.4–5)
ALBUMIN UR-MCNC: NEGATIVE MG/DL
ALP SERPL-CCNC: 65 U/L (ref 40–150)
ALT SERPL W P-5'-P-CCNC: 21 U/L (ref 0–50)
ANION GAP SERPL CALCULATED.3IONS-SCNC: 7 MMOL/L (ref 3–14)
APPEARANCE UR: CLEAR
AST SERPL W P-5'-P-CCNC: 21 U/L (ref 0–45)
BASOPHILS # BLD AUTO: 0 10E9/L (ref 0–0.2)
BASOPHILS NFR BLD AUTO: 0.3 %
BILIRUB SERPL-MCNC: 0.4 MG/DL (ref 0.2–1.3)
BILIRUB UR QL STRIP: NEGATIVE
BUN SERPL-MCNC: 41 MG/DL (ref 7–30)
CALCIUM SERPL-MCNC: 7.8 MG/DL (ref 8.5–10.1)
CHLORIDE SERPL-SCNC: 104 MMOL/L (ref 94–109)
CO2 SERPL-SCNC: 26 MMOL/L (ref 20–32)
COLOR UR AUTO: ABNORMAL
CREAT SERPL-MCNC: 1.69 MG/DL (ref 0.52–1.04)
DIFFERENTIAL METHOD BLD: ABNORMAL
EOSINOPHIL # BLD AUTO: 0.2 10E9/L (ref 0–0.7)
EOSINOPHIL NFR BLD AUTO: 2.4 %
ERYTHROCYTE [DISTWIDTH] IN BLOOD BY AUTOMATED COUNT: 13.6 % (ref 10–15)
GFR SERPL CREATININE-BSD FRML MDRD: 29 ML/MIN/1.7M2
GLUCOSE SERPL-MCNC: 110 MG/DL (ref 70–99)
GLUCOSE UR STRIP-MCNC: NEGATIVE MG/DL
HCT VFR BLD AUTO: 38.3 % (ref 35–47)
HGB BLD-MCNC: 12.4 G/DL (ref 11.7–15.7)
HGB UR QL STRIP: NEGATIVE
IMM GRANULOCYTES # BLD: 0 10E9/L (ref 0–0.4)
IMM GRANULOCYTES NFR BLD: 0.2 %
KETONES UR STRIP-MCNC: NEGATIVE MG/DL
LEUKOCYTE ESTERASE UR QL STRIP: ABNORMAL
LYMPHOCYTES # BLD AUTO: 1.3 10E9/L (ref 0.8–5.3)
LYMPHOCYTES NFR BLD AUTO: 20 %
MCH RBC QN AUTO: 33.2 PG (ref 26.5–33)
MCHC RBC AUTO-ENTMCNC: 32.4 G/DL (ref 31.5–36.5)
MCV RBC AUTO: 102 FL (ref 78–100)
MONOCYTES # BLD AUTO: 0.6 10E9/L (ref 0–1.3)
MONOCYTES NFR BLD AUTO: 9.6 %
NEUTROPHILS # BLD AUTO: 4.2 10E9/L (ref 1.6–8.3)
NEUTROPHILS NFR BLD AUTO: 67.5 %
NITRATE UR QL: NEGATIVE
NRBC # BLD AUTO: 0 10*3/UL
NRBC BLD AUTO-RTO: 0 /100
NT-PROBNP SERPL-MCNC: 2473 PG/ML (ref 0–1800)
PH UR STRIP: 6.5 PH (ref 5–7)
PLATELET # BLD AUTO: 270 10E9/L (ref 150–450)
POTASSIUM SERPL-SCNC: 3.8 MMOL/L (ref 3.4–5.3)
PROT SERPL-MCNC: 7.5 G/DL (ref 6.8–8.8)
RBC # BLD AUTO: 3.74 10E12/L (ref 3.8–5.2)
RBC #/AREA URNS AUTO: 2 /HPF (ref 0–2)
SODIUM SERPL-SCNC: 136 MMOL/L (ref 133–144)
SOURCE: ABNORMAL
SP GR UR STRIP: 1.01 (ref 1–1.03)
SQUAMOUS #/AREA URNS AUTO: 1 /HPF (ref 0–1)
TRANS CELLS #/AREA URNS HPF: 1 /HPF (ref 0–1)
TROPONIN I SERPL-MCNC: <0.015 UG/L (ref 0–0.04)
TSH SERPL DL<=0.005 MIU/L-ACNC: 0.87 MU/L (ref 0.4–4)
UROBILINOGEN UR STRIP-MCNC: NORMAL MG/DL (ref 0–2)
WBC # BLD AUTO: 6.2 10E9/L (ref 4–11)
WBC #/AREA URNS AUTO: 5 /HPF (ref 0–5)

## 2018-11-28 PROCEDURE — 93005 ELECTROCARDIOGRAM TRACING: CPT | Performed by: EMERGENCY MEDICINE

## 2018-11-28 PROCEDURE — 21400006 ZZH R&B CCU INTERMEDIATE UMMC

## 2018-11-28 PROCEDURE — 85025 COMPLETE CBC W/AUTO DIFF WBC: CPT | Performed by: EMERGENCY MEDICINE

## 2018-11-28 PROCEDURE — 99285 EMERGENCY DEPT VISIT HI MDM: CPT | Mod: 25 | Performed by: EMERGENCY MEDICINE

## 2018-11-28 PROCEDURE — 84484 ASSAY OF TROPONIN QUANT: CPT | Performed by: EMERGENCY MEDICINE

## 2018-11-28 PROCEDURE — 25000132 ZZH RX MED GY IP 250 OP 250 PS 637: Performed by: STUDENT IN AN ORGANIZED HEALTH CARE EDUCATION/TRAINING PROGRAM

## 2018-11-28 PROCEDURE — 93010 ELECTROCARDIOGRAM REPORT: CPT | Mod: Z6 | Performed by: EMERGENCY MEDICINE

## 2018-11-28 PROCEDURE — 25000128 H RX IP 250 OP 636: Performed by: EMERGENCY MEDICINE

## 2018-11-28 PROCEDURE — 83880 ASSAY OF NATRIURETIC PEPTIDE: CPT | Performed by: EMERGENCY MEDICINE

## 2018-11-28 PROCEDURE — 80053 COMPREHEN METABOLIC PANEL: CPT | Performed by: EMERGENCY MEDICINE

## 2018-11-28 PROCEDURE — 84443 ASSAY THYROID STIM HORMONE: CPT | Performed by: EMERGENCY MEDICINE

## 2018-11-28 PROCEDURE — 99223 1ST HOSP IP/OBS HIGH 75: CPT | Mod: GC | Performed by: INTERNAL MEDICINE

## 2018-11-28 PROCEDURE — 25000131 ZZH RX MED GY IP 250 OP 636 PS 637: Performed by: STUDENT IN AN ORGANIZED HEALTH CARE EDUCATION/TRAINING PROGRAM

## 2018-11-28 PROCEDURE — 71046 X-RAY EXAM CHEST 2 VIEWS: CPT

## 2018-11-28 PROCEDURE — 96374 THER/PROPH/DIAG INJ IV PUSH: CPT | Performed by: EMERGENCY MEDICINE

## 2018-11-28 PROCEDURE — 81001 URINALYSIS AUTO W/SCOPE: CPT | Performed by: EMERGENCY MEDICINE

## 2018-11-28 PROCEDURE — A9270 NON-COVERED ITEM OR SERVICE: HCPCS | Performed by: STUDENT IN AN ORGANIZED HEALTH CARE EDUCATION/TRAINING PROGRAM

## 2018-11-28 RX ORDER — AMOXICILLIN 250 MG
1 CAPSULE ORAL DAILY PRN
Status: ON HOLD | COMMUNITY
End: 2018-12-04

## 2018-11-28 RX ORDER — HYDROCORTISONE 2.5 %
CREAM (GRAM) TOPICAL 2 TIMES DAILY PRN
Status: ON HOLD | COMMUNITY
End: 2018-12-04

## 2018-11-28 RX ORDER — ASCORBIC ACID 500 MG
500 TABLET ORAL DAILY
Status: ON HOLD | COMMUNITY
End: 2018-12-04

## 2018-11-28 RX ORDER — CARVEDILOL 3.12 MG/1
3.12 TABLET ORAL 2 TIMES DAILY WITH MEALS
Status: DISCONTINUED | OUTPATIENT
Start: 2018-11-28 | End: 2018-12-04 | Stop reason: HOSPADM

## 2018-11-28 RX ORDER — CARBOXYMETHYLCELLULOSE SODIUM 10 MG/ML
1 GEL OPHTHALMIC DAILY PRN
Status: ON HOLD | COMMUNITY
End: 2018-12-04

## 2018-11-28 RX ORDER — AZATHIOPRINE 50 MG/1
50 TABLET ORAL DAILY
Status: DISCONTINUED | OUTPATIENT
Start: 2018-11-28 | End: 2018-12-04 | Stop reason: HOSPADM

## 2018-11-28 RX ORDER — ALBUTEROL SULFATE 0.83 MG/ML
2.5 SOLUTION RESPIRATORY (INHALATION) EVERY 6 HOURS PRN
Status: DISCONTINUED | OUTPATIENT
Start: 2018-11-28 | End: 2018-12-04 | Stop reason: HOSPADM

## 2018-11-28 RX ORDER — ACETAMINOPHEN 500 MG
500-1000 TABLET ORAL EVERY 6 HOURS PRN
Status: ON HOLD | COMMUNITY
End: 2018-12-04

## 2018-11-28 RX ORDER — TRIAMCINOLONE ACETONIDE 0.25 MG/G
CREAM TOPICAL DAILY PRN
Status: ON HOLD | COMMUNITY
End: 2018-12-04

## 2018-11-28 RX ORDER — FOLIC ACID 1 MG/1
1 TABLET ORAL DAILY
Status: DISCONTINUED | OUTPATIENT
Start: 2018-11-29 | End: 2018-12-04 | Stop reason: HOSPADM

## 2018-11-28 RX ORDER — AMOXICILLIN 250 MG
2 CAPSULE ORAL
Status: DISCONTINUED | OUTPATIENT
Start: 2018-11-28 | End: 2018-12-04 | Stop reason: HOSPADM

## 2018-11-28 RX ORDER — TRIMETHOPRIM 100 MG/1
50 TABLET ORAL DAILY
Status: DISCONTINUED | OUTPATIENT
Start: 2018-11-28 | End: 2018-12-04 | Stop reason: HOSPADM

## 2018-11-28 RX ORDER — FUROSEMIDE 10 MG/ML
40 INJECTION INTRAMUSCULAR; INTRAVENOUS ONCE
Status: COMPLETED | OUTPATIENT
Start: 2018-11-28 | End: 2018-11-28

## 2018-11-28 RX ORDER — LEVOTHYROXINE SODIUM 75 UG/1
75 TABLET ORAL DAILY
Status: DISCONTINUED | OUTPATIENT
Start: 2018-11-29 | End: 2018-12-04 | Stop reason: HOSPADM

## 2018-11-28 RX ADMIN — RANITIDINE 150 MG: 150 TABLET ORAL at 23:06

## 2018-11-28 RX ADMIN — CARVEDILOL 3.12 MG: 3.12 TABLET, FILM COATED ORAL at 23:05

## 2018-11-28 RX ADMIN — AZATHIOPRINE 50 MG: 50 TABLET ORAL at 23:05

## 2018-11-28 RX ADMIN — Medication 50 MG: at 23:06

## 2018-11-28 RX ADMIN — APIXABAN 2.5 MG: 2.5 TABLET, FILM COATED ORAL at 23:06

## 2018-11-28 RX ADMIN — FUROSEMIDE 40 MG: 10 INJECTION, SOLUTION INTRAVENOUS at 18:54

## 2018-11-28 ASSESSMENT — ENCOUNTER SYMPTOMS
SHORTNESS OF BREATH: 1
HEADACHES: 0
FATIGUE: 1
NECK STIFFNESS: 0
CHILLS: 0
ABDOMINAL PAIN: 0
COLOR CHANGE: 0
LIGHT-HEADEDNESS: 1
POLYDIPSIA: 0
AGITATION: 0
NAUSEA: 0
ADENOPATHY: 0
NECK PAIN: 0
DIFFICULTY URINATING: 0
BACK PAIN: 0
WEAKNESS: 1
VOMITING: 0
FEVER: 0

## 2018-11-28 ASSESSMENT — ACTIVITIES OF DAILY LIVING (ADL)
FALL_HISTORY_WITHIN_LAST_SIX_MONTHS: YES
SWALLOWING: 0-->SWALLOWS FOODS/LIQUIDS WITHOUT DIFFICULTY
BATHING: 2 - ASSISTIVE PERSON
RETIRED_EATING: 0-->INDEPENDENT
AMBULATION: 1-->ASSISTIVE EQUIPMENT
WHICH_OF_THE_ABOVE_FUNCTIONAL_RISKS_HAD_A_RECENT_ONSET_OR_CHANGE?: BATHING;DRESSING
AMBULATION: 1 - ASSISTIVE EQUIPMENT
COGNITION: 0 - NO COGNITION ISSUES REPORTED
TOILETING: 1-->ASSISTIVE EQUIPMENT
BATHING: 2-->ASSISTIVE PERSON
DRESS: 0 - INDEPENDENT
TRANSFERRING: 1 - ASSISTIVE EQUIPMENT
TOILETING: 2 - ASSISTIVE PERSON
EATING: 0 - INDEPENDENT
NUMBER_OF_TIMES_PATIENT_HAS_FALLEN_WITHIN_LAST_SIX_MONTHS: 2
DRESS: 2-->ASSISTIVE PERSON
TRANSFERRING: 1-->ASSISTIVE EQUIPMENT
SWALLOWING: 0 - SWALLOWS FOODS/LIQUIDS WITHOUT DIFFICULTY
COMMUNICATION: 0 - UNDERSTANDS/COMMUNICATES WITHOUT DIFFICULTY
RETIRED_COMMUNICATION: 0-->UNDERSTANDS/COMMUNICATES WITHOUT DIFFICULTY

## 2018-11-28 NOTE — IP AVS SNAPSHOT
UNIT 6C [facility]: [phone]                                              INTERAGENCY TRANSFER FORM - PHYSICIAN ORDERS   2018                   CHF Orders/Instructions for Nursing Home/TCU     CHF Orders and Instructions for Nursing Home/TCU  1.  Have the patient get a scale to put in their room and then use at home.    2.  Post a weight chart or calendar in room next to the scale.    3.  Ask patient to weigh themselves daily first thing when they get up in the morning, before breakfast, with only their night gown on and record on the chart/calendar (if able).    4.  Record NH/TCU admission weight.    5.  Record daily am weight, with same clothes every day. If patient is in a wheelchair, note weight of wheel chair.     6.  Provide patient CHF education booklet and review with patient and family.    7.  Vital signs twice daily and prn. Check oxygen sats prn dyspnea.    8.  Apply Oxygen 2 or 3 liters/min by nasal cannula prn O2 Sat < 90%.     9.  Notify NP/MD:   a) If HR <50 bpm, SBP <90mmHg, or O2 Sat < 90%.   b) If weight is up more than 2 lbs. in one day or 5 lbs. in one week from their admission weight OR if patient has signs or symptoms of CHF, such as worsening dyspnea or leg edema.    10.  ACE-wraps or support stockings (knee high) to bilateral lower extremities prn edema. On in a.m. and off at HS.    11.  Diet: 2 gm sodium cardiac diet, with additional diet modifications if ordered elsewhere.    12.  Fluid restriction:  1500cc/day, if hospital discharge sodium < 134.    13.  Dietician: CHF education.    14.  PT/OT to Evaluate and Treat for deconditioning and CHF.    15.  Activity as tolerated.     16.  PT to notify RN if wheelchair equipment has been modified (change in weight should also be noted on the patients weight calendar).         Hospital Admission Date: 2018  RG WALTER   : 1931  Sex: Female        Attending Provider: Talat Littlejohn MD     Allergies:   "Cephalexin Hcl, Gabapentin, Naproxen, Perfume, Macrobid [Nitrofurantoin Anhydrous], Seasonal Allergies, Sulfa Drugs, Xanax [Alprazolam], Ciprofloxacin    Infection:  None   Service:  CARDIOLOGY    Ht:  1.613 m (5' 3.5\")   Wt:  70.9 kg (156 lb 4.8 oz)   Admission Wt:  70.8 kg (156 lb)    BMI:  27.25 kg/m 2   BSA:  1.78 m 2            Patient PCP Information     Provider PCP Type    Tre Lockett MD General      ED Clinical Impression     Diagnosis Description Comment Added By Time Added    Fatigue, unspecified type [R53.83] Fatigue, unspecified type [R53.83]  Jagjit Otoole MD 11/28/2018  6:04 PM    Near syncope [R55] Near syncope [R55]  Jagjit Otoole MD 11/28/2018  6:04 PM    Light headedness [R42] Light headedness [R42]  Jagjit Otoole MD 11/28/2018  6:04 PM    Atrial flutter, unspecified type (H) [I48.92] Atrial flutter, unspecified type (H) [I48.92]  Jagjit Otoole MD 11/28/2018  6:19 PM      Hospital Problems as of 12/4/2018              Priority Class Noted POA    Heart failure (H) Medium  11/28/2018 Yes      Non-Hospital Problems as of 12/4/2018              Priority Class Noted    Rheumatoid arthritis involving multiple sites with positive rheumatoid factor (H) Medium  8/19/2010    Hypertension goal BP (blood pressure) < 150/90 Medium  9/7/2012    Hypothyroidism Medium  9/7/2012    Macular degeneration, left eye Medium  5/7/2013    Irritable bowel syndrome Medium  10/29/2013    Encounter for palliative care Medium  5/18/2015    Adjustment disorder with anxious mood Medium  5/18/2015    Mild anemia Medium  9/25/2015    DDD (degenerative disc disease), lumbar Medium  9/25/2015    CKD (chronic kidney disease) stage 3, GFR 30-59 ml/min (H) Medium  9/29/2015    History of blood transfusion Medium  9/29/2015    Aftercare following surgery Medium  10/28/2015    S/P lumbar laminectomy Medium  10/28/2015    High risk medication use High  11/3/2015    Age-related osteoporosis without current pathological " fracture High  11/3/2015    Atrophic vaginitis Medium  2/8/2016    Fecal incontinence Medium  2/8/2016    Female stress incontinence Medium  2/8/2016    Impingement syndrome of both shoulders Medium  4/5/2016    UIP (usual interstitial pneumonitis) (H) Medium  4/5/2016    High risk medications (not anticoagulants) long-term use Medium  4/5/2016    Heart failure with preserved ejection fraction (H) Medium  6/2/2016    Congestive heart failure with preserved LV function, NYHA class 3 (H) Medium  6/21/2016    Other specified hypothyroidism Medium  7/22/2016    Health Care Home Medium  1/13/2017    Sick sinus syndrome (H) Medium  2/19/2017    Cardiac pacemaker - Medtronic dual lead pacemaker - Not Dependent - MRI Safe Medium  7/5/2017    Immunosuppression (H) Medium  11/21/2017    ILD (interstitial lung disease) (H) Medium  1/16/2018    Heart failure with preserved ejection fraction, NYHA class I (H) Medium  3/19/2018    Atrial flutter (H) Medium  7/31/2018    Paroxysmal atrial fibrillation (H) Medium  9/18/2018    CHF exacerbation (H) Medium  10/8/2018    Pre-syncope Medium  11/17/2018      Code Status History     Date Active Date Inactive Code Status Order ID Comments User Context    12/4/2018  2:07 PM 12/4/2018  2:45 PM DNR [id number] DNR but would be OKAY WITH INTUBATION Peggy Ramos MD Outpatient    11/18/2018 11:22 AM 12/4/2018  2:07 PM DNR [id number]  Ashley Justin MD Outpatient    11/17/2018  6:26 PM 11/18/2018 11:22 AM DNR [id number] Discussion with patient and family  DNR but would be OKAY WITH INTUBATION Aslhey Justin MD Inpatient    10/12/2018  8:29 AM 11/17/2018  6:26 PM Full Code [id number]  Talat Umaña MD Outpatient    10/15/2015  7:48 AM 10/12/2018  8:29 AM Full Code [id number]  Fili Lockett MD Outpatient    10/13/2015  2:23 PM 10/15/2015  7:48 AM Full Code [id number]  Fili Lockett MD Inpatient    9/18/2014  7:36 AM 9/18/2014 11:29 AM Full  Code [id number]  Shayla Wise RN Inpatient         Medication Review      CONTINUE these medications which may have CHANGED, or have new prescriptions. If we are uncertain of the size of tablets/capsules you have at home, strength may be listed as something that might have changed.        Dose / Directions Comments    acetaminophen 500 MG tablet   Commonly known as:  TYLENOL   This may have changed:    - how much to take  - reasons to take this   Used for:  Rheumatoid arthritis involving multiple sites with positive rheumatoid factor (H)        Dose:  500 mg   Take 1 tablet (500 mg) by mouth every 6 hours as needed for mild pain or fever   Refills:  0        levothyroxine 75 MCG tablet   Commonly known as:  SYNTHROID/LEVOTHROID   This may have changed:  See the new instructions.   Used for:  Hypothyroidism, unspecified type        Dose:  75 mcg   Take 1 tablet (75 mcg) by mouth daily   Quantity:  90 tablet   Refills:  1        ranibizumab 0.3 MG/0.05ML Soln   Commonly known as:  LUCENTIS   This may have changed:  when to take this   Used for:  Macular degeneration of left eye, unspecified type        Dose:  0.3 mg   0.05 mLs (0.3 mg) by Intravitreal route See Admin Instructions Every 6 weeks   Refills:  0    Reportedly receives this injection every 6 weeks, unknown timing of last dose. Please coordinate with patient's primary care provider or ophthalmologist to determine timing of next dose.       ranitidine 150 MG tablet   Commonly known as:  ZANTAC   This may have changed:  Another medication with the same name was removed. Continue taking this medication, and follow the directions you see here.   Used for:  Gastroesophageal reflux disease without esophagitis        Dose:  150 mg   Take 1 tablet (150 mg) by mouth 2 times daily   Quantity:  60 tablet   Refills:  0          CONTINUE these medications which have NOT CHANGED        Dose / Directions Comments    abatacept 250 MG injection   Commonly known as:   ORENCIA   Used for:  Rheumatoid arthritis involving multiple sites with positive rheumatoid factor (H)        Dose:  750 mg   Inject 750 mg into the vein every 28 days   Quantity:  3 each   Refills:  0    Last dose reportedly on 11/26. Will likely be due for next dose on 12/24. Please discuss with outpatient rheumatology provider.       albuterol (2.5 MG/3ML) 0.083% neb solution   Commonly known as:  PROVENTIL   Used for:  ILD (interstitial lung disease) (H)        Dose:  2.5 mg   Take 1 vial (2.5 mg) by nebulization every 6 hours as needed for shortness of breath / dyspnea or wheezing   Quantity:  360 mL   Refills:  0        apixaban ANTICOAGULANT 2.5 MG tablet   Commonly known as:  ELIQUIS   Used for:  Atrial flutter, paroxysmal (H)        Dose:  2.5 mg   Take 1 tablet (2.5 mg) by mouth 2 times daily   Quantity:  180 tablet   Refills:  3        azaTHIOprine 50 MG tablet   Commonly known as:  IMURAN   Used for:  Rheumatoid arthritis involving multiple sites with positive rheumatoid factor (H)        Dose:  50 mg   Take 1 tablet (50 mg) by mouth daily   Quantity:  90 tablet   Refills:  2        Blood Pressure Monitor Kit   Used for:  CHF (congestive heart failure) (H)        Dose:  1 kit   1 kit daily as needed   Quantity:  1 kit   Refills:  0        calcium carbonate-vitamin D 500-400 MG-UNIT tablet   Commonly known as:  OS-FATOUMATA   Used for:  Rheumatoid arthritis involving multiple sites with positive rheumatoid factor (H)        Dose:  1 tablet   Take 1 tablet by mouth daily   Quantity:  60 tablet   Refills:  0        Carboxymethylcellulose Sod PF 1 % ophthalmic gel   Commonly known as:  CELLUVISC/REFRESH LIQUIGEL   Used for:  Dry eyes        Dose:  1 drop   Place 1 drop into both eyes daily as needed for dry eyes   Quantity:  1 Bottle   Refills:  0        carvedilol 3.125 MG tablet   Commonly known as:  COREG   Used for:  Heart failure with preserved ejection fraction, NYHA class I (H), Benign essential  hypertension        Dose:  3.125 mg   Take 1 tablet (3.125 mg) by mouth 2 times daily (with meals)   Quantity:  60 tablet   Refills:  11    Hold if SBP<90 or HR<60 and discuss with MD       cetirizine 10 MG tablet   Commonly known as:  ZYRTEC ALLERGY   Used for:  Seasonal allergic rhinitis due to other allergic trigger        Dose:  10 mg   Take 1 tablet (10 mg) by mouth At Bedtime   Quantity:  30 tablet   Refills:  0        denosumab 60 MG/ML Soln injection   Commonly known as:  PROLIA   Used for:  Age-related osteoporosis without current pathological fracture        Dose:  60 mg   Inject 1 mL (60 mg) Subcutaneous every 6 months   Quantity:  1 mL   Refills:  0    Reportedly last given on 11/26/18. Due for next dose on 5/26/18. Please discuss with endocrinology or primary care prior to administering this dose.       folic acid 1 MG tablet   Commonly known as:  FOLVITE   Used for:  Oral aphthae        Dose:  1 mg   Take 1 tablet (1 mg) by mouth daily   Quantity:  90 tablet   Refills:  3        furosemide 20 MG tablet   Commonly known as:  LASIX   Used for:  Heart failure with preserved ejection fraction, NYHA class I (H)        Take 40mg in the AM and 20mg in the PM   Quantity:  120 tablet   Refills:  3        hypromellose 0.3 % Soln ophthalmic solution   Commonly known as:  GENTEAL   Used for:  Dry eyes        Dose:  1 drop   Place 1 drop into both eyes daily as needed for dry eyes   Refills:  0        nitroGLYcerin 0.4 MG sublingual tablet   Commonly known as:  NITROSTAT   Used for:  Acute chest pain        Dose:  0.4 mg   Place 1 tablet (0.4 mg) under the tongue every 5 minutes as needed for chest pain   Quantity:  25 tablet   Refills:  0    Page MD if use is required       * order for DME   Used for:  Rheumatoid arthritis involving left wrist with positive rheumatoid factor (H)        Equipment being ordered: Dispense face mask. Mrs. Spicer is immunosuppressed due to rheumatoid arthritis.   Quantity:  1 Box    Refills:  11        * order for DME   Used for:  Hypoxia        Equipment being ordered: Nebulizer. Use with Albuterol.   Quantity:  1 each   Refills:  0        order for DME   Used for:  Pneumonia of left upper lobe due to Mycoplasma pneumoniae        Equipment being ordered: Dispense baffle, for use with nebulizer.   Quantity:  1 each   Refills:  0        * order for DME   Used for:  Pain of toe of right foot, Status post bunionectomy        Dispense SP Walker-small   Quantity:  1 each   Refills:  0        senna-docusate 8.6-50 MG tablet   Commonly known as:  SENOKOT-S/PERICOLACE   Used for:  Irritable bowel syndrome, unspecified type        Dose:  1 tablet   Take 1 tablet by mouth daily as needed for constipation   Quantity:  100 tablet   Refills:  0        spironolactone 25 MG tablet   Commonly known as:  ALDACTONE   Used for:  Heart failure with preserved ejection fraction, NYHA class I (H)        Dose:  12.5 mg   Take 0.5 tablets (12.5 mg) by mouth daily   Quantity:  30 tablet   Refills:  1        trimethoprim 100 MG tablet   Commonly known as:  TRIMPEX   Used for:  Recurrent UTI        Dose:  50 mg   Take 0.5 tablets (50 mg) by mouth daily   Quantity:  45 tablet   Refills:  1        vitamin C 500 MG tablet   Commonly known as:  ASCORBIC ACID   Used for:  Vitamin deficiency        Dose:  500 mg   Take 1 tablet (500 mg) by mouth daily   Quantity:  30 tablet   Refills:  0        * Notice:  This list has 3 medication(s) that are the same as other medications prescribed for you. Read the directions carefully, and ask your doctor or other care provider to review them with you.      STOP taking     COMPOUNDED NON-CONTROLLED SUBSTANCE - PHARMACY TO MIX COMPOUNDED MEDICATION   Commonly known as:  CMPD RX           fish oil-omega-3 fatty acids 1000 MG capsule           hydrocortisone 2.5 % cream           PRESERVISION AREDS PO           saccharomyces boulardii 250 MG capsule   Commonly known as:  FLORASTOR            triamcinolone 0.025 % cream   Commonly known as:  KENALOG                   Summary of Visit     Reason for your hospital stay       Patient was admitted to the hospital with shortness of breath, likely secondary to multiple factors including heart failure with preserved ejection fraction and rheumatic lung disease. Shortness of breath improved throughout admission. Patient also had episodes of dizziness and pre-syncope during admission, cause unknown but possibly vasovagal in nature. She will require outpatient neurology follow up for additional workup and management of these episodes.             After Care     Activity - Up with nursing assistance           Additional Discharge Instructions       See discharge summary for complete information       Advance Diet as Tolerated       Follow this diet upon discharge: Orders Placed This Encounter      2 Gram Sodium Diet       Daily weights       Call Provider for weight gain of more than 2 pounds per day or 5 pounds per week.       Fall precautions           General info for SNF       Length of Stay Estimate: Short Term Care: Estimated # of Days <30  Condition at Discharge: Improving  Level of care:skilled   Rehabilitation Potential: Good  Admission H&P remains valid and up-to-date: Yes  Recent Chemotherapy: N/A  Use Nursing Home Standing Orders: Yes       Glucose monitor nursing POCT       Before meals and at bedtime       Intake and output       Every shift       Mantoux instructions       Give two-step Mantoux (PPD) Per Facility Policy Yes             Referrals     Medication Therapy Management Referral       MTM referral reason            Patient had a hospital or ED visit in last 6 months and has more than 10   PTA or Discharge medications    Patient has 5 PTA or Discharge Medications AND one of the following   diagnoses: DM,HF,COPD,AMI DX,PULM HTN       This service is designed to help you get the most from your medications.  A specially trained pharmacist will  work closely with you and your doctors  to solve any problems related to your medications and to help you get the   best results from taking them.      The Medication Therapy Management staff will call you to schedule an appointment.       Neurology Adult Referral       Evaluated by neurology during recent hospitalization. Outpatient follow up recommended by neurology provider for additional workup and management of dizziness, light headedness, and near syncope events along with weakness (MG panel pending at time of discharge).       Occupational Therapy Adult Consult       Evaluate and treat as clinically indicated.    Reason:  Generalized weakness and dizziness       Physical Therapy Adult Consult       Evaluate and treat as clinically indicated.    Reason:  Generalized weakness and dizziness             Your next 10 appointments already scheduled     Dec 13, 2018  2:00 PM CST   (Arrive by 1:45 PM)   New Patient Visit with José Miguel Mak MD   [facility] ([facility])    [address] [place] [id number] [phone]            Dec 14, 2018  9:45 AM CST   LAB with  LAB   [facility]    [address] [address] [facility] [address]   [phone]           Please do not eat 10-12 hours before your appointment if you are coming in fasting for labs on lipids, cholesterol, or glucose (sugar). This does not apply to pregnant women. Water, hot tea and black coffee (with nothing added) are okay. Do not drink other fluids, diet soda or chew gum.            Dec 17, 2018  9:30 AM CST   Core Return with Karla Mabry NP   [facility] ([facility])    [address] [place] [address]   [phone]            Dec 19, 2018  3:15 PM CST   Lab with  LAB   [facility] ([facility])    [address] [place] [address]  [address]  1st Floor  [facility] [address]   [phone]            Dec 19, 2018  4:00 PM CST   (Arrive by 3:45 PM)   CARDIAC DEVICE CHECK - IN CLINIC with  CV DEVICE 1   [facility] ([facility])    [address]  3rd Floor  [facility] [address]   [phone]            Dec 19, 2018  4:30 PM CST   (Arrive by 4:15 PM)   RETURN ARRHYTHMIA with Franki Carrasquillo MD   [facility] ([facility])    [address]  [address]  [facility] [address]   [phone]            Dec 27, 2018  9:00 AM CST   Level 1 with [address] ([facility])    [address] [address] [place] [address]   [phone]            Jim 15, 2019  2:40 PM CST   Return Visit with Mario Davenport MD   [facility] ([facility])    [address] [facility] [address]   [phone]            Jan 24, 2019 10:00 AM CST   Level 1 with [address] ([facility])    [address] [address] [place] [id number] [phone]            Feb 15, 2019 10:00 AM CST   Return Visit with Aisa Steven PA-C   [facility] ([facility])    [address] [address] [place] [address]   [phone]              Follow-Up Appointment Instructions     Future Labs/Procedures    Follow Up and recommended labs and tests     Comments:    Follow up with Nursing home physician.   Follow up with Cardiology (Dr. Carrasquillo) as scheduled on 12/19 or within one month of discharge.   Follow up with Neurology (new referral) in 1-2 months.   Follow up with Pulmonology (Dr. Perlman) within 1 month.   Follow up with Rheumatology as scheduled.      Follow-Up Appointment Instructions     Follow Up and recommended labs and tests       Follow up with Nursing home physician.   Follow up with  Cardiology (Dr. Carrasquillo) as scheduled on 12/19 or within one month of discharge.   Follow up with Neurology (new referral) in 1-2 months.   Follow up with Pulmonology (Dr. Perlman) within 1 month.   Follow up with Rheumatology as scheduled.             Statement of Approval     Ordered          12/04/18 1409  I have reviewed and agree with all the recommendations and orders detailed in this document.  EFFECTIVE NOW     Approved and electronically signed by:  Peggy Ramos MD               General Recommendations To Control Heart Failure When You Get Home (Give at DC from TCU)     Heart Failure Instructions for Patients and Families: Please read and check off each of these important instructions as you do them when you get home.     Weight and Symptoms    ___ Put a scale in your bathroom.    ___ Post a weight chart or calendar next to your scale.    ___ Weigh yourself everyday as soon as you get up in the morning (before breakfast).  You should only be wearing your pajamas.  Write your weight on the chart/calendar.    ___ Bring your weight chart/calendar with you to all appointments.    ___ Call your doctor or nurse practitioner if you gain 2 pounds (in 1 day) or 5 pounds in (1 week) from your goal  good  weight.  Your good weight is also called your  dry  weight.  Your doctor or nurse will tell you what your good weight should be.    ___ Call your doctor or nurse practitioner if you have shortness of breath that gets worse over time, leg swelling or fatigue.    Medications and Diet    ___ Make sure to take your medication as prescribed.    ___ Bring a current list of your medication and all of your medicine bottles with you to all appointments.    ___ Limit fluids if you still have swelling or shortness of breath, or if your doctor tells you to do so.      ___ Eat less than 2000 mg of sodium (salt) every day. Read food labels, and do not add salt to meals. Remember, if you eat less salt you retain less  fluid.    ___ Follow a heart healthy diet that is low in saturated fat.         Activity and Suggested Lifestyle Changes   ___ Stay active. Talk to your doctor about an exercise program that is safe for your heart.    ___ Stop smoking. Reduce alcohol use.      ___ Lose weight if you are overweight. Extra weight puts a lot of stress on the heart.    Control for Leg Swelling  ___ Keep your legs elevated (raised) as needed for swelling. If swelling is uncomfortable or elevation doesn t help, ask your doctor about using ACE wraps or support stockings.      What is the [facility]?     Cardiomyopathy  Optimization  Rehabilitation  Education    The [facility] is a heart failure specialty clinic within [facility].  It is an outpatient disease management program that is based on a phase-by-phase approach, which is tailored to each patient s individual needs.  The cardiologist, nurse practitioner, physician assistant and nurses provide an ongoing outpatient care and treatment plan that guides heart failure and cardiomyopathy patients from evaluation and education to stabilization. This team works with your current primary care doctor and cardiologist to help you:      Avoid hospitalizations    Slow the progression of the disease    Improve length and quality of life    Know who and when to call if heart failure symptoms appear    Receive easy access to quality health care and advice    Better understand your condition and treatment    Decrease the tremendous cost burden of heart failure care    Detect future heart problems before they become life threatening    Your [facility] Clinic Team will continue to educate you on your heart failure and may adjust medications based on your vital signs, lab work, and how you are feeling.  Therefore, it is very important to bring the following to all C.O.R.E. appointments:    - An accurate list of your medications  - Your medicine bottles  - Your weight  chart/calendar    [facility] ([place]):    [phone]  [facility] ([place]):    [phone]  [facility] ([place]):  [phone]

## 2018-11-28 NOTE — IP AVS SNAPSHOT
` `     [address] [facility] BANK: [phone]            Medication Administration Report for Linda Spicer as of 12/04/18 1514   Legend:    Given Hold Not Given Due Canceled Entry Other Actions    Time Time (Time) Time  Time-Action       Inactive    Active    Linked        Medications 11/28/18 11/29/18 11/30/18 12/01/18 12/02/18 12/03/18 12/04/18    albuterol (PROVENTIL) neb solution 2.5 mg  Dose: 2.5 mg  Freq: EVERY 6 HOURS PRN Route: NEBULIZATION  PRN Reasons: shortness of breath / dyspnea,wheezing  Start: 11/28/18 2055   Admin. Amount: 2.5 mg = 3 mL Conc: 2.5 mg/3 mL  Dispense Loc: [facility] ADS 6C1  Volume: 3 mL               apixaban ANTICOAGULANT (ELIQUIS) tablet 2.5 mg  Dose: 2.5 mg  Freq: 2 TIMES DAILY Route: PO  Start: 11/28/18 2100   Admin. Amount: 1 tablet (1 × 2.5 mg tablet)  Last Admin: 12/04/18 1023  Dispense Loc: [facility] 6C1     2306 (2.5 mg)-Given [C]        0816 (2.5 mg)-Given       2055 (2.5 mg)-Given        0911 (2.5 mg)-Given       2054 (2.5 mg)-Given        0822 (2.5 mg)-Given       1954 (2.5 mg)-Given        0950 (2.5 mg)-Given       2013 (2.5 mg)-Given        0852 (2.5 mg)-Given       2033 (2.5 mg)-Given        1023 (2.5 mg)-Given       [ ] 2000           azaTHIOprine (IMURAN) tablet 50 mg  Dose: 50 mg  Freq: DAILY Route: PO  Start: 11/28/18 2115   Admin. Amount: 1 tablet (1 × 50 mg tablet)  Last Admin: 12/03/18 2033  Dispense Loc: [facility] 6C[facility]     2305 (50 mg)-Given [C]        2055 (50 mg)-Given        2054 (50 mg)-Given        1954 (50 mg)-Given        2013 (50 mg)-Given        2033 (50 mg)-Given        [ ] 2000           carvedilol (COREG) tablet 3.125 mg  Dose: 3.125 mg  Freq: 2 TIMES DAILY WITH MEALS Route: PO  Start: 11/28/18 2100   Admin. Amount: 1 tablet (1 × 3.125 mg tablet)  Last Admin: 12/04/18 1023  Dispense Loc: [facility] 6C[facility]     2305 (3.125 mg)-Given [C]        0816 (3.125 mg)-Given       1749 (3.125 mg)-Given        0911 (3.125 mg)-Given       1809 (3.125 mg)-Given         0822 (3.125 mg)-Given       1749 (3.125 mg)-Given        0950 (3.125 mg)-Given       1759 (3.125 mg)-Given        0852 (3.125 mg)-Given       1716 (3.125 mg)-Given        1023 (3.125 mg)-Given       [ ] 1800           folic acid (FOLVITE) tablet 1 mg  Dose: 1 mg  Freq: DAILY Route: PO  Start: 11/29/18 0800   Admin. Amount: 1 tablet (1 × 1 mg tablet)  Last Admin: 12/04/18 1023  Dispense Loc: [facility] 6C[facility]      0816 (1 mg)-Given        0911 (1 mg)-Given        0823 (1 mg)-Given        0950 (1 mg)-Given        0851 (1 mg)-Given        1023 (1 mg)-Given           furosemide (LASIX) tablet 20 mg  Dose: 20 mg  Freq: DAILY Route: PO  Start: 12/02/18 1600   Admin. Amount: 1 tablet (1 × 20 mg tablet)  Last Admin: 12/03/18 1532  Dispense Loc: [facility] 6C1         1648 (20 mg)-Given        1532 (20 mg)-Given        [ ] 1600           furosemide (LASIX) tablet 40 mg  Dose: 40 mg  Freq: DAILY Route: PO  Start: 12/02/18 1215   Admin. Amount: 1 tablet (1 × 40 mg tablet)  Last Admin: 12/04/18 1024  Dispense Loc: [facility] 6C1         1301 (40 mg)-Given        0852 (40 mg)-Given        1024 (40 mg)-Given           levothyroxine (SYNTHROID/LEVOTHROID) tablet 75 mcg  Dose: 75 mcg  Freq: DAILY Route: PO  Start: 11/29/18 0800   Admin Instructions: Separate oral administration of iron- or calcium-containing products and levothyroxine by at least 4 hours.    Admin. Amount: 1 tablet (1 × 75 mcg tablet)  Last Admin: 12/04/18 0823  Dispense Loc: [facility] 6C[facility]      0816 (75 mcg)-Given        0911 (75 mcg)-Given        0821 (75 mcg)-Given        0858 (75 mcg)-Given        0851 (75 mcg)-Given        0823 (75 mcg)-Given           Patient is already receiving anticoagulation with heparin, enoxaparin (LOVENOX), warfarin (COUMADIN)  or other anticoagulant medication  Freq: CONTINUOUS PRN Route: XX  Start: 11/28/18 2055   Dispense Loc: [facility]               ranitidine (ZANTAC) tablet 150 mg  Dose: 150 mg  Freq: 2 TIMES  DAILY Route: PO  Start: 11/28/18 2100   Admin. Amount: 1 tablet (1 × 150 mg tablet)  Last Admin: 12/04/18 1035  Dispense Loc: [facility] 6C1     2306 (150 mg)-Given [C]        (0816)-Not Given [C]       2054 (150 mg)-Given        0911 (150 mg)-Given       2054 (150 mg)-Given        0822 (150 mg)-Given       1954 (150 mg)-Given        0950 (150 mg)-Given       2013 (150 mg)-Given        0851 (150 mg)-Given       2033 (150 mg)-Given        (1024)-Not Given [C]       1035 (150 mg)-Given [C]       [ ] 2000           Reason ACE/ARB/ARNI order not selected  Freq: DOES NOT GO TO MAR Route: OTHER  PRN Reason: other  Start: 12/04/18 1409   Order specific questions:  Reason not prescribed: Diastolic Heart Failure ejection fraction > 40%     Dispense Loc: [facility]  POC: Discharge-NON Med               senna-docusate (SENOKOT-S/PERICOLACE) 8.6-50 MG per tablet 2 tablet  Dose: 2 tablet  Freq: AT BEDTIME PRN Route: PO  PRN Reason: constipation  Start: 11/28/18 2055   Admin. Amount: 2 tablet  Dispense Loc: [facility] 6C[facility]               spironolactone (ALDACTONE) half-tab 12.5 mg  Dose: 12.5 mg  Freq: DAILY Route: PO  Start: 11/29/18 0800   Admin. Amount: 1 half-tab (1 × 12.5 mg half-tab)  Last Admin: 12/04/18 1025  Dispense Loc: [facility] 6C1      0816 (12.5 mg)-Given        0911 (12.5 mg)-Given        0822 (12.5 mg)-Given        0951 (12.5 mg)-Given        0851 (12.5 mg)-Given        1025 (12.5 mg)-Given           trimethoprim (TRIMPEX) half-tab 50 mg  Dose: 50 mg  Freq: DAILY Route: PO  Indications Comment: prophylaxis  Start: 11/28/18 2200   Admin. Amount: 1 half-tab (1 × 50 mg half-tab)  Last Admin: 12/03/18 2036  Dispense Loc: [facility]     2306 (50 mg)-Given [C]        2055 (50 mg)-Given        2055 (50 mg)-Given        1956 (50 mg)-Given        2013 (50 mg)-Given        2036 (50 mg)-Given        [ ] 2000          Discontinued Medications  Medications 11/28/18 11/29/18 11/30/18 12/01/18 12/02/18  18         Dose: 10 mg  Freq: ONCE PRN Route: PO  PRN Comment: For SBP>170 or DBP>110  Start: 18 0003   End: 18 1350   Admin. Amount: 1 tablet (1 × 10 mg tablet)  Dispense Loc: [facility] 6C[facility]  Administrations Remainin           1350-Med Discontinued         Dose: 10 mg  Freq: 3 TIMES DAILY Route: PO  Start: 18 0800   End: 18 1133   Admin. Amount: 1 tablet (1 × 10 mg tablet)  Last Admin: 1851  Dispense Loc: [facility] AD[facility] 6C1      0816 (10 mg)-Given       1426 (10 mg)-Given       2055 (10 mg)-Given        0911 (10 mg)-Given       1346 (10 mg)-Given       2054 (10 mg)-Given        0823 (10 mg)-Given       1307 (10 mg)-Given       1954 (10 mg)-Given        0950 (10 mg)-Given       1500 (10 mg)-Given       2013 (10 mg)-Given        0851 (10 mg)-Given       1133-Med Discontinued          Dose: 10 mg  Freq: 3 TIMES DAILY BEFORE MEALS Route: PO  Start: 18 0730   End: 18 1133   Admin Instructions: Recommended to take on empty stomach.    Admin. Amount: 1 tablet (1 × 10 mg tablet)  Last Admin: 1851  Dispense Loc: [facility] 6C[facility]      0816 (10 mg)-Given       1249 (10 mg)-Given       1749 (10 mg)-Given        0911 (10 mg)-Given       1345 (10 mg)-Given       1652 (10 mg)-Given        0822 (10 mg)-Given       1154 (10 mg)-Given       1717 (10 mg)-Given        0858 (10 mg)-Given       1301 (10 mg)-Given       1754 (10 mg)-Given        0851 (10 mg)-Given       1133-Med Discontinued

## 2018-11-28 NOTE — LETTER
Transition Communication Hand-off for Care Transitions to Next Level of Care Provider    Name: Linda Spicer  : 1931  MRN #: [id number]  Primary Care Provider: Tre Lockett     Primary Clinic: [facility] [address]  [place] [facility] [address]     Reason for Hospitalization:  Near syncope [R55]  Light headedness [R42]  Fatigue, unspecified type [R53.83]  Atrial flutter, unspecified type (H) [I48.92]  Admit Date/Time: 2018  3:30 PM  Discharge Date: 2018  Payor Source: Payor: MEDICARE / Plan: MEDICARE / Product Type: Medicare /     Readmission Assessment Measure (ZAHIRA) Risk Score/category: Unknown    Plan of Care Goals/Milestone Events:   Patient Concern:   Patient Goals:   Short-term    Long-term   Medical Goals   Short-term    Long-term         Reason for Communication Hand-off Referral: Other TCU rec    Discharge Plan: Rec for TCU        Concern for non-adherence with plan of care:   Y/N No  Discharge Needs Assessment:  Needs       Most Recent Value    Equipment Currently Used at Home raised toilet, shower chair, walker, rolling, wheelchair, power          Already enrolled in Tele-monitoring program and name of program: NA  Follow-up specialty is recommended: Unknown at this time    Follow-up plan:  Future Appointments  Date Time Provider Department Center   2018 6:00 AM [facility]   12/3/2018 7:00 PM UU OT OVERFLOW [facility]   2018 9:00 AM PFT LAB [facility] [place]   2018 2:00 PM José Miguel Mak MD [facility]   2018 9:45 AM BK LAB [facility]   2018 9:30 AM Karla Mabry NP FKUMHT UMP [facility]   2018 3:15 PM UC LAB UCLAB [facility]   2018 4:00 PM 1, Uc Cv Device UCCVCV [facility]   2018 4:30 PM [facility]   2018 9:00 AM BAY 9 INFUSION [facility] [place]   1/15/2019 2:40 PM Mario Davenport MD [facility] [place] [facility]   2019 10:00 AM BAY 9 INFUSION [facility] [place]    2/15/2019 10:00 AM Asia Steven PA-C [facility] [place]   3/1/2019 10:00 AM Asia Steven PA-C [facility] [place]       Any outstanding tests or procedures:              Key Recommendations:      Destiney Lambert    AVS/Discharge Summary is the source of truth; this is a helpful guide for improved communication of patient story

## 2018-11-28 NOTE — IP AVS SNAPSHOT
MRN:[id number]                      After Visit Summary   11/28/2018    Linda Spicer    MRN: [id number]           Thank you!     Thank you for choosing [facility] for your care. Our goal is always to provide you with excellent care. Hearing back from our patients is one way we can continue to improve our services. Please take a few minutes to complete the written survey that you may receive in the mail after you visit with us. Thank you!        Patient Information     Date Of Birth          6/13/1931        About your hospital stay     You were admitted on:  November 28, 2018 You last received care in the:  [address] [facility]    You were discharged on:  December 4, 2018        Reason for your hospital stay       Patient was admitted to the hospital with shortness of breath, likely secondary to multiple factors including heart failure with preserved ejection fraction and rheumatic lung disease. Shortness of breath improved throughout admission. Patient also had episodes of dizziness and pre-syncope during admission, cause unknown but possibly vasovagal in nature. She will require outpatient neurology follow up for additional workup and management of these episodes.                  Who to Call     For medical emergencies, please call 911.  For non-urgent questions about your medical care, please call your primary care provider or clinic, [phone]          Attending Provider     Provider Specialty    Jagjit Otoole MD Emergency Medicine    Ruiz Amato DO Emergency Medicine    Quinn Nieves MD Cardiology    [facility]       Primary Care Provider Office Phone # Fax #    rTe [facility]ett [phone]      After Care Instructions     Activity - Up with nursing assistance           Additional Discharge Instructions       See discharge summary for complete information            Advance Diet as Tolerated       Follow this diet upon  discharge: Orders Placed This Encounter      2 Gram Sodium Diet            Daily weights       Call Provider for weight gain of more than 2 pounds per day or 5 pounds per week.            Fall precautions           General info for SNF       Length of Stay Estimate: Short Term Care: Estimated # of Days <30  Condition at Discharge: Improving  Level of care:skilled   Rehabilitation Potential: Good  Admission H&P remains valid and up-to-date: Yes  Recent Chemotherapy: N/A  Use Nursing Home Standing Orders: Yes            Glucose monitor nursing POCT       Before meals and at bedtime            Intake and output       Every shift            Mantoux instructions       Give two-step Mantoux (PPD) Per Facility Policy Yes                  Follow-up Appointments     Follow Up and recommended labs and tests       Follow up with Nursing home physician.   Follow up with Cardiology (Dr. Carrasquillo) as scheduled on 12/19 or within one month of discharge.   Follow up with Neurology (new referral) in 1-2 months.   Follow up with Pulmonology (Dr. Perlman) within 1 month.   Follow up with Rheumatology as scheduled.                  Your next 10 appointments already scheduled     Dec 13, 2018  2:00 PM CST   (Arrive by 1:45 PM)   New Patient Visit with José Miguel Mak MD   [facility] ([facility])    [address] [place] [address]  [address] [place] [address] [phone]            Dec 14, 2018  9:45 AM CST   LAB with BK LAB   [facility]    [address] [place] [address] [phone]           Please do not eat 10-12 hours before your appointment if you are coming in fasting for labs on lipids, cholesterol, or glucose (sugar). This does not apply to pregnant women. Water, hot tea and black coffee (with nothing added) are okay. Do not drink other fluids, diet soda or chew gum.            Dec 17, 2018  9:30 AM CST   Core  Return with Karla Mabry NP   [facility] ([facility])    [address]  [facility] [address]   [phone]            Dec 19, 2018  3:15 PM CST   Lab with UC LAB   [facility] ([facility])    [address]  1st [address]  [facility] [address]   [phone]            Dec 19, 2018  4:00 PM CST   (Arrive by 3:45 PM)   CARDIAC DEVICE CHECK - IN CLINIC with UC CV DEVICE 1   [facility] ([facility])    [address]  3rd Floor  [facility] [address]   [phone]            Dec 19, 2018  4:30 PM CST   (Arrive by 4:15 PM)   RETURN ARRHYTHMIA with Franki Carrasquillo MD   [facility] ([facility])    [address]  [address]  [facility] [address] [phone]            Dec 27, 2018  9:00 AM CST   Level 1 with [address] ([facility])    [address] [place] [address] [phone]            Jim 15, 2019  2:40 PM CST   Return Visit with Mario Davenport MD   [facility] ([facility])    [address] [facility] [address]   [phone]            Jan 24, 2019 10:00 AM CST   Level 1 with [address] ([facility])    [address] [address] [place] [address] [phone]            Feb 15, 2019 10:00 AM CST   Return Visit with Asia Steven PA-C   [facility] ([facility])    [address] [place] [address] [phone]              Additional Services     Medication Therapy Management Referral       MTM referral reason            Patient had a hospital or ED visit in last 6 months and has more than 10   PTA or Discharge medications    Patient has 5 PTA or Discharge Medications AND one  of the following   diagnoses: DM,HF,COPD,AMI DX,PULM HTN       This service is designed to help you get the most from your medications.  A specially trained pharmacist will work closely with you and your doctors  to solve any problems related to your medications and to help you get the   best results from taking them.      The Medication Therapy Management staff will call you to schedule an appointment.            Neurology Adult Referral       Evaluated by neurology during recent hospitalization. Outpatient follow up recommended by neurology provider for additional workup and management of dizziness, light headedness, and near syncope events along with weakness (MG panel pending at time of discharge).            Occupational Therapy Adult Consult       Evaluate and treat as clinically indicated.    Reason:  Generalized weakness and dizziness            Physical Therapy Adult Consult       Evaluate and treat as clinically indicated.    Reason:  Generalized weakness and dizziness                  General Recommendations To Control Heart Failure When You Get Home     Heart Failure Instructions for Patients and Families: Please read and check off each of these important instructions as you do them when you get home.     Weight and Symptoms    ___ Put a scale in your bathroom.    ___ Post a weight chart or calendar next to your scale.    ___ Weigh yourself everyday as soon as you get up in the morning (before breakfast).  You should only be wearing your pajamas.  Write your weight on the chart/calendar.    ___ Bring your weight chart/calendar with you to all appointments.    ___ Call your doctor or nurse practitioner if you gain 2 pounds (in 1 day) or 5 pounds in (1 week) from your goal  good  weight.  Your good weight is also called your  dry  weight.  Your doctor or nurse will tell you what your good weight should be.    ___ Call your doctor or nurse practitioner if you have shortness of breath that gets worse over  time, leg swelling or fatigue.    Medications and Diet    ___ Make sure to take your medication as prescribed.    ___ Bring a current list of your medication and all of your medicine bottles with you to all appointments.    ___ Limit fluids if you still have swelling or shortness of breath, or if your doctor tells you to do so.      ___ Eat less than 2000 mg of sodium (salt) every day. Read food labels, and do not add salt to meals. Remember, if you eat less salt you retain less fluid.    ___ Follow a heart healthy diet that is low in saturated fat.         Activity and Suggested Lifestyle Changes   ___ Stay active. Talk to your doctor about an exercise program that is safe for your heart.    ___ Stop smoking. Reduce alcohol use.      ___ Lose weight if you are overweight. Extra weight puts a lot of stress on the heart.    Control for Leg Swelling  ___ Keep your legs elevated (raised) as needed for swelling. If swelling is uncomfortable or elevation doesn t help, ask your doctor about using ACE wraps or support stockings.      What is the C.O.R.E. Clinic?     Cardiomyopathy  Optimization  Rehabilitation  Education    The C.O.R.E. Clinic is a heart failure specialty clinic within [facility].  It is an outpatient disease management program that is based on a phase-by-phase approach, which is tailored to each patient s individual needs.  The cardiologist, nurse practitioner, physician assistant and nurses provide an ongoing outpatient care and treatment plan that guides heart failure and cardiomyopathy patients from evaluation and education to stabilization. This team works with your current primary care doctor and cardiologist to help you:      Avoid hospitalizations    Slow the progression of the disease    Improve length and quality of life    Know who and when to call if heart failure symptoms appear    Receive easy access to quality health care and advice    Better understand your condition and  "treatment    Decrease the tremendous cost burden of heart failure care    Detect future heart problems before they become life threatening    Your C.O.R.E. Clinic Team will continue to educate you on your heart failure and may adjust medications based on your vital signs, lab work, and how you are feeling.  Therefore, it is very important to bring the following to all C.O.R.E. appointments:    - An accurate list of your medications  - Your medicine bottles  - Your weight chart/calendar    [facility] ([place]):    [phone]  [facility] ([place]):    [phone]  [facility] ([place]):  [phone]        Pending Results     Date and Time Order Name Status Description    12/1/2018 2300 Acetylcholine receptor blocking muriel In process     12/1/2018 2300 Acetylcholine modulating antibody In process     12/1/2018 2300 Striated muscle antibody IgG In process     12/1/2018 2300 Acetylcholine receptor binding In process     12/1/2018 1457 Methylmalonic acid In process             Statement of Approval     Ordered          12/04/18 1409  I have reviewed and agree with all the recommendations and orders detailed in this document.  EFFECTIVE NOW     Approved and electronically signed by:  Peggy Ramos MD             Admission Information     Date & Time Provider Department Dept. Phone    11/28/2018 Talat Littlejohn MD [address] [facility] [phone]      Your Vitals Were     Blood Pressure Pulse Temperature Respirations Height Weight    136/72 (BP Location: Left arm, Cuff Size: Adult Regular) 69 97.5  F (36.4  C) (Oral) 18 1.613 m (5' 3.5\") 70.9 kg (156 lb 4.8 oz)    Last Period Pulse Oximetry BMI (Body Mass Index)             (LMP Unknown) 98% 27.25 kg/m2         [facility] Information     [facility] gives you secure access to your electronic health record. If you see a primary care provider, you can also send messages to your care team and " make appointments. If you have questions, please call your primary care clinic.  If you do not have a primary care provider, please call [phone] and they will assist you.        Care EveryWhere ID     This is your Care EveryWhere ID. This could be used by other organizations to access your [facility] medical records  [id number]        Equal Access to Services     [facility] : Hadii aad ku hadmyrajason Sojasonali, waaxda luqadaha, qaybta kaalmada juventinodelanofidencio, zahraa quezadairmaaddy moss . So [facility] [phone].    ATENCIÓN: Si habla español, tiene a merchant disposición servicios gratuitos de asistencia lingüística. Alin al [phone].    We comply with applicable federal civil rights laws and [place] laws. We do not discriminate on the basis of race, color, national origin, age, disability, sex, sexual orientation, or gender identity.               Review of your medicines      CONTINUE these medicines which may have CHANGED, or have new prescriptions. If we are uncertain of the size of tablets/capsules you have at home, strength may be listed as something that might have changed.        Dose / Directions    acetaminophen 500 MG tablet   Commonly known as:  TYLENOL   This may have changed:    - how much to take  - reasons to take this   Used for:  Rheumatoid arthritis involving multiple sites with positive rheumatoid factor (H)        Dose:  500 mg   Take 1 tablet (500 mg) by mouth every 6 hours as needed for mild pain or fever   Refills:  0       levothyroxine 75 MCG tablet   Commonly known as:  SYNTHROID/LEVOTHROID   This may have changed:  See the new instructions.   Used for:  Hypothyroidism, unspecified type        Dose:  75 mcg   Take 1 tablet (75 mcg) by mouth daily   Quantity:  90 tablet   Refills:  1       ranitidine 150 MG tablet   Commonly known as:  ZANTAC   This may have changed:  Another medication with the same name was removed. Continue taking this medication, and follow the directions you  see here.   Used for:  Gastroesophageal reflux disease without esophagitis        Dose:  150 mg   Take 1 tablet (150 mg) by mouth 2 times daily   Quantity:  60 tablet   Refills:  0         CONTINUE these medicines which have NOT CHANGED        Dose / Directions    abatacept 250 MG injection   Commonly known as:  ORENCIA   Used for:  Rheumatoid arthritis involving multiple sites with positive rheumatoid factor (H)        Dose:  750 mg   Inject 750 mg into the vein every 28 days   Quantity:  3 each   Refills:  0       albuterol (2.5 MG/3ML) 0.083% neb solution   Commonly known as:  PROVENTIL   Used for:  ILD (interstitial lung disease) (H)        Dose:  2.5 mg   Take 1 vial (2.5 mg) by nebulization every 6 hours as needed for shortness of breath / dyspnea or wheezing   Quantity:  360 mL   Refills:  0       apixaban ANTICOAGULANT 2.5 MG tablet   Commonly known as:  ELIQUIS   Used for:  Atrial flutter, paroxysmal (H)        Dose:  2.5 mg   Take 1 tablet (2.5 mg) by mouth 2 times daily   Quantity:  180 tablet   Refills:  3       azaTHIOprine 50 MG tablet   Commonly known as:  IMURAN   Used for:  Rheumatoid arthritis involving multiple sites with positive rheumatoid factor (H)        Dose:  50 mg   Take 1 tablet (50 mg) by mouth daily   Quantity:  90 tablet   Refills:  2       Blood Pressure Monitor Kit   Used for:  CHF (congestive heart failure) (H)        Dose:  1 kit   1 kit daily as needed   Quantity:  1 kit   Refills:  0       calcium carbonate-vitamin D 500-400 MG-UNIT tablet   Commonly known as:  OS-FATOUMATA   Used for:  Rheumatoid arthritis involving multiple sites with positive rheumatoid factor (H)        Dose:  1 tablet   Take 1 tablet by mouth daily   Quantity:  60 tablet   Refills:  0       Carboxymethylcellulose Sod PF 1 % ophthalmic gel   Commonly known as:  CELLUVISC/REFRESH LIQUIGEL   Used for:  Dry eyes        Dose:  1 drop   Place 1 drop into both eyes daily as needed for dry eyes   Quantity:  1 Bottle    Refills:  0       carvedilol 3.125 MG tablet   Commonly known as:  COREG   Used for:  Heart failure with preserved ejection fraction, NYHA class I (H), Benign essential hypertension        Dose:  3.125 mg   Take 1 tablet (3.125 mg) by mouth 2 times daily (with meals)   Quantity:  60 tablet   Refills:  11       cetirizine 10 MG tablet   Commonly known as:  ZYRTEC ALLERGY   Used for:  Seasonal allergic rhinitis due to other allergic trigger        Dose:  10 mg   Take 1 tablet (10 mg) by mouth At Bedtime   Quantity:  30 tablet   Refills:  0       denosumab 60 MG/ML Soln injection   Commonly known as:  PROLIA        Dose:  60 mg   Inject 60 mg Subcutaneous every 6 months   Refills:  0       fish oil-omega-3 fatty acids 1000 MG capsule        Dose:  1 g   Take 1 g by mouth 2 times daily   Refills:  0       folic acid 1 MG tablet   Commonly known as:  FOLVITE   Used for:  Oral aphthae        Dose:  1 mg   Take 1 tablet (1 mg) by mouth daily   Quantity:  90 tablet   Refills:  3       furosemide 20 MG tablet   Commonly known as:  LASIX   Used for:  Heart failure with preserved ejection fraction, NYHA class I (H)        Take 40mg in the AM and 20mg in the PM   Quantity:  120 tablet   Refills:  3       hypromellose 0.3 % Soln ophthalmic solution   Commonly known as:  GENTEAL   Used for:  Dry eyes        Dose:  1 drop   Place 1 drop into both eyes daily as needed for dry eyes   Refills:  0       nitroGLYcerin 0.4 MG sublingual tablet   Commonly known as:  NITROSTAT   Used for:  Acute chest pain        Dose:  0.4 mg   Place 1 tablet (0.4 mg) under the tongue every 5 minutes as needed for chest pain   Quantity:  25 tablet   Refills:  0       * order for DME   Used for:  Rheumatoid arthritis involving left wrist with positive rheumatoid factor (H)        Equipment being ordered: Dispense face mask. Mrs. Spicer is immunosuppressed due to rheumatoid arthritis.   Quantity:  1 Box   Refills:  11       * order for DME   Used for:   Hypoxia        Equipment being ordered: Nebulizer. Use with Albuterol.   Quantity:  1 each   Refills:  0       order for DME   Used for:  Pneumonia of left upper lobe due to Mycoplasma pneumoniae        Equipment being ordered: Dispense baffle, for use with nebulizer.   Quantity:  1 each   Refills:  0       * order for DME   Used for:  Pain of toe of right foot, Status post bunionectomy        Dispense SP Walker-small   Quantity:  1 each   Refills:  0       ranibizumab 0.3 MG/0.05ML Soln   Commonly known as:  LUCENTIS        Dose:  0.3 mg   0.3 mg by Intravitreal route Every 6 weeks   Refills:  0       senna-docusate 8.6-50 MG tablet   Commonly known as:  SENOKOT-S/PERICOLACE   Used for:  Irritable bowel syndrome, unspecified type        Dose:  1 tablet   Take 1 tablet by mouth daily as needed for constipation   Quantity:  100 tablet   Refills:  0       spironolactone 25 MG tablet   Commonly known as:  ALDACTONE   Used for:  Heart failure with preserved ejection fraction, NYHA class I (H)        Dose:  12.5 mg   Take 0.5 tablets (12.5 mg) by mouth daily   Quantity:  30 tablet   Refills:  1       trimethoprim 100 MG tablet   Commonly known as:  TRIMPEX   Used for:  Recurrent UTI        Dose:  50 mg   Take 0.5 tablets (50 mg) by mouth daily   Quantity:  45 tablet   Refills:  1       vitamin C 500 MG tablet   Commonly known as:  ASCORBIC ACID   Used for:  Vitamin deficiency        Dose:  500 mg   Take 1 tablet (500 mg) by mouth daily   Quantity:  30 tablet   Refills:  0       * Notice:  This list has 3 medication(s) that are the same as other medications prescribed for you. Read the directions carefully, and ask your doctor or other care provider to review them with you.      STOP taking     COMPOUNDED NON-CONTROLLED SUBSTANCE - PHARMACY TO MIX COMPOUNDED MEDICATION   Commonly known as:  CMPD RX           hydrocortisone 2.5 % cream           PRESERVISION AREDS PO           saccharomyces boulardii 250 MG capsule    Commonly known as:  FLORASTOR           triamcinolone 0.025 % cream   Commonly known as:  KENALOG                Where to get your medicines      Some of these will need a paper prescription and others can be bought over the counter. Ask your nurse if you have questions.     You don't need a prescription for these medications     abatacept 250 MG injection    acetaminophen 500 MG tablet    albuterol (2.5 MG/3ML) 0.083% neb solution    apixaban ANTICOAGULANT 2.5 MG tablet    azaTHIOprine 50 MG tablet    calcium carbonate-vitamin D 500-400 MG-UNIT tablet    Carboxymethylcellulose Sod PF 1 % ophthalmic gel    carvedilol 3.125 MG tablet    cetirizine 10 MG tablet    folic acid 1 MG tablet    furosemide 20 MG tablet    hypromellose 0.3 % Soln ophthalmic solution    levothyroxine 75 MCG tablet    nitroGLYcerin 0.4 MG sublingual tablet    ranitidine 150 MG tablet    senna-docusate 8.6-50 MG tablet    spironolactone 25 MG tablet    trimethoprim 100 MG tablet    vitamin C 500 MG tablet                Protect others around you: Learn how to safely use, store and throw away your medicines at www.disposemymeds.org.        ANTIBIOTIC INSTRUCTION     You've Been Prescribed an Antibiotic - Now What?  Your healthcare team thinks that you or your loved one might have an infection. Some infections can be treated with antibiotics, which are powerful, life-saving drugs. Like all medications, antibiotics have side effects and should only be used when necessary. There are some important things you should know about your antibiotic treatment.      Your healthcare team may run tests before you start taking an antibiotic.    Your team may take samples (e.g., from your blood, urine or other areas) to run tests to look for bacteria. These test can be important to determine if you need an antibiotic at all and, if you do, which antibiotic will work best.      Within a few days, your healthcare team might change or even stop your  antibiotic.    Your team may start you on an antibiotic while they are working to find out what is making you sick.    Your team might change your antibiotic because test results show that a different antibiotic would be better to treat your infection.    In some cases, once your team has more information, they learn that you do not need an antibiotic at all. They may find out that you don't have an infection, or that the antibiotic you're taking won't work against your infection. For example, an infection caused by a virus can't be treated with antibiotics. Staying on an antibiotic when you don't need it is more likely to be harmful than helpful.      You may experience side effects from your antibiotic.    Like all medications, antibiotics have side effects. Some of these can be serious.    Let you healthcare team know if you have any known allergies when you are admitted to the hospital.    One significant side effect of nearly all antibiotics is the risk of severe and sometimes deadly diarrhea caused by Clostridium difficile (C. Difficile). This occurs when a person takes antibiotics because some good germs are destroyed. Antibiotic use allows C. diificile to take over, putting patients at high risk for this serious infection.    As a patient or caregiver, it is important to understand your or your loved one's antibiotic treatment. It is especially important for caregivers to speak up when patients can't speak for themselves. Here are some important questions to ask your healthcare team.    What infection is this antibiotic treating and how do you know I have that infection?    What side effects might occur from this antibiotic?    How long will I need to take this antibiotic?    Is it safe to take this antibiotic with other medications or supplements (e.g., vitamins) that I am taking?     Are there any special directions I need to know about taking this antibiotic? For example, should I take it with  food?    How will I be monitored to know whether my infection is responding to the antibiotic?    What tests may help to make sure the right antibiotic is prescribed for me?      Information provided by:  www.cdc.gov/getsmart  U.S. Department of Health and Human Services  Centers for disease Control and Prevention  National Center for Emerging and Zoonotic Infectious Diseases  Division of Healthcare Quality Promotion             Medication List: This is a list of all your medications and when to take them. Check marks below indicate your daily home schedule. Keep this list as a reference.      Medications           Morning Afternoon Evening Bedtime As Needed    abatacept 250 MG injection   Commonly known as:  ORENCIA   Inject 750 mg into the vein every 28 days                                acetaminophen 500 MG tablet   Commonly known as:  TYLENOL   Take 1 tablet (500 mg) by mouth every 6 hours as needed for mild pain or fever                                albuterol (2.5 MG/3ML) 0.083% neb solution   Commonly known as:  PROVENTIL   Take 1 vial (2.5 mg) by nebulization every 6 hours as needed for shortness of breath / dyspnea or wheezing                                apixaban ANTICOAGULANT 2.5 MG tablet   Commonly known as:  ELIQUIS   Take 1 tablet (2.5 mg) by mouth 2 times daily   Last time this was given:  2.5 mg on 12/4/2018 10:23 AM                                azaTHIOprine 50 MG tablet   Commonly known as:  IMURAN   Take 1 tablet (50 mg) by mouth daily   Last time this was given:  50 mg on 12/3/2018  8:33 PM                                Blood Pressure Monitor Kit   1 kit daily as needed                                calcium carbonate-vitamin D 500-400 MG-UNIT tablet   Commonly known as:  OS-FATOUMATA   Take 1 tablet by mouth daily                                Carboxymethylcellulose Sod PF 1 % ophthalmic gel   Commonly known as:  CELLUVISC/REFRESH LIQUIGEL   Place 1 drop into both eyes daily as needed for dry  eyes                                carvedilol 3.125 MG tablet   Commonly known as:  COREG   Take 1 tablet (3.125 mg) by mouth 2 times daily (with meals)   Last time this was given:  3.125 mg on 12/4/2018 10:23 AM                                cetirizine 10 MG tablet   Commonly known as:  ZYRTEC ALLERGY   Take 1 tablet (10 mg) by mouth At Bedtime                                denosumab 60 MG/ML Soln injection   Commonly known as:  PROLIA   Inject 60 mg Subcutaneous every 6 months                                fish oil-omega-3 fatty acids 1000 MG capsule   Take 1 g by mouth 2 times daily                                folic acid 1 MG tablet   Commonly known as:  FOLVITE   Take 1 tablet (1 mg) by mouth daily   Last time this was given:  1 mg on 12/4/2018 10:23 AM                                furosemide 20 MG tablet   Commonly known as:  LASIX   Take 40mg in the AM and 20mg in the PM   Last time this was given:  40 mg on 12/4/2018 10:24 AM                                hypromellose 0.3 % Soln ophthalmic solution   Commonly known as:  GENTEAL   Place 1 drop into both eyes daily as needed for dry eyes                                levothyroxine 75 MCG tablet   Commonly known as:  SYNTHROID/LEVOTHROID   Take 1 tablet (75 mcg) by mouth daily   Last time this was given:  75 mcg on 12/4/2018  8:23 AM                                nitroGLYcerin 0.4 MG sublingual tablet   Commonly known as:  NITROSTAT   Place 1 tablet (0.4 mg) under the tongue every 5 minutes as needed for chest pain                                * order for DME   Equipment being ordered: Dispense face mask. Mrs. Spicer is immunosuppressed due to rheumatoid arthritis.                                * order for DME   Equipment being ordered: Nebulizer. Use with Albuterol.                                order for DME   Equipment being ordered: Dispense baffle, for use with nebulizer.                                * order for DME   Dispense SP  Walker-small                                ranibizumab 0.3 MG/0.05ML Soln   Commonly known as:  LUCENTIS   0.3 mg by Intravitreal route Every 6 weeks                                ranitidine 150 MG tablet   Commonly known as:  ZANTAC   Take 1 tablet (150 mg) by mouth 2 times daily   Last time this was given:  150 mg on 12/4/2018 10:35 AM                                senna-docusate 8.6-50 MG tablet   Commonly known as:  SENOKOT-S/PERICOLACE   Take 1 tablet by mouth daily as needed for constipation                                spironolactone 25 MG tablet   Commonly known as:  ALDACTONE   Take 0.5 tablets (12.5 mg) by mouth daily   Last time this was given:  12.5 mg on 12/4/2018 10:25 AM                                trimethoprim 100 MG tablet   Commonly known as:  TRIMPEX   Take 0.5 tablets (50 mg) by mouth daily   Last time this was given:  50 mg on 12/3/2018  8:36 PM                                vitamin C 500 MG tablet   Commonly known as:  ASCORBIC ACID   Take 1 tablet (500 mg) by mouth daily                                * Notice:  This list has 3 medication(s) that are the same as other medications prescribed for you. Read the directions carefully, and ask your doctor or other care provider to review them with you.

## 2018-11-28 NOTE — IP AVS SNAPSHOT
` `     [address] [facility]: [phone]                 INTERAGENCY TRANSFER FORM - NOTES (H&P, Discharge Summary, Consults, Procedures, Therapies)   2018                    Hospital Admission Date: 2018  RG SPICER   : 1931  Sex: Female        Patient PCP Information     Provider PCP Type    Tre Lockett MD General         History & Physicals      H&P by Lisa Hammonds MD at 2018  7:34 PM     Author:  Lisa Hammonds MD Service:  Cardiology Author Type:  Fellow    Filed:  2018  5:18 AM Date of Service:  2018  7:34 PM Creation Time:  2018  7:34 PM    Status:  Attested :  Lisa Hammonds MD (Fellow)    Cosigner:  Quinn Nieves MD at 2018  5:10 PM        Attestation signed by Quinn Nieves MD at 2018  5:10 PM        Attestation:  Physician Attestation   I, Quinn Nieves, saw this patient with the resident and agree with the resident/fellow's findings and plan of care as documented in the note.      I personally reviewed vital signs, medications, labs, imaging and ECGs.    Key findings: Patient with acute diastolic heart failure and previously severe mitral regurgitation.  Plan for afterload reduction and diuresis.  Will check echo to re-evaluate valve.    Quinn Nieves MD  Date of Service (when I saw the patient): 18                                 History and Physical    Cards 1     Date of Service: 18  Date of Admission: 2018  Patient Name: gR Spicer  : 1931  MRN: [id number]       Chief complaint:   Generalized weakness     History of Present Illness:   Rg Spicer is a 87 year old female with PMH of HFpEF, PAF/Afluter (CHADDSVASC4 on eliquis) s/p multiple cardioversions currently on amiodarone, tachy lisa s/p PPM 2/ rheumatoid pulmonary fibrosis, CKD, hypothyroidism,  who presents with fatigue and shortness of breath.[AG1.1] She states she has  been feeling poorly about a week ago she feels she is about to faint is short of breath at times at rest and with any minimal exertion she gets lightheaded and at times feels she is about to pass out. She states her flutter feels somewhat different since she has the pacer she cant tell when she is in an abnormal rhythm but she gets fatigued quicker, she was cardioverted on 10/16/18. She went to core clinic yesterday she has been having high blood pressures since 11/25 yesterday as high as 174/90 with pulse remaining in the 60's. Her weight has not changes much she may be up one pound.[AG1.2]     Last echo 10/9/18 showed an EF 55-60%   In the ED the patient was[AG1.1] hypertensive 154/59 Hr in the 60's and afebrile[AG1.3]. Labs were significant for[AG1.1] troponin was negative SCr 1.69 BUN 41 baseline around 1.3-1.4, Nt pro BNP is elevated at 2473[AG1.2].[AG1.1] She has WBC of 12.4 hgb at baseline of 12.[AG1.2]  The patient was treated with[AG1.1] 40mg Iv lasix and sent to the floor.     She denies any coughing fever or sick contacts. ROS otherwise negative[AG1.3]    PTA cardiac meds  apixaban 2.5mg BID  Coreg 3.125mg BID  Spironolactone 12.5mg daily  Lasix 40mg in the am and 20mg in the pm[AG1.4]      Review of Symptoms:     Comprehensive 10 point review of systems was negative unless otherwise noted in the HPI.     Past Medical History:     Past Medical History:   Diagnosis Date     Adjustment disorder with anxious mood 5/18/2015     Advanced directives, counseling/discussion 8/30/2012    Patient states has Advance Directive and will bring in a copy to clinic. 8/30/2012   Tevin May  [facility] Medical Assistant \       Anemia 9/25/2015     Basal cell cancer      CHF (congestive heart failure) (H) 9/18/2014     CKD (chronic kidney disease) stage 3, GFR 30-59 ml/min (H) 9/29/2015     DDD (degenerative disc disease), lumbar 9/25/2015     Diffuse idiopathic pulmonary fibrosis (H) 5/6/2013     Encounter for palliative  care 5/18/2015     History of blood transfusion 9/29/2015     Hypertension goal BP (blood pressure) < 140/90 9/7/2012     Hypothyroid 9/7/2012     Irritable bowel syndrome 10/29/2013     Macular degeneration      Macular degeneration, 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     Nondisplaced spiral fracture of shaft of humerus      Osteoporosis 8/13/2013     Imo Update utility     RA (rheumatoid arthritis) (H) 5/7/2013     Rheumatic fever      Shingles      Spinal stenosis of lumbar region with neurogenic claudication 9/14/2015       Past Surgical History:   Procedure Laterality Date     ANESTHESIA CARDIOVERSION N/A 9/14/2018    Procedure: ANESTHESIA CARDIOVERSION;;  Surgeon: GENERIC ANESTHESIA PROVIDER;  Location: [facility] OR     ANESTHESIA CARDIOVERSION N/A 10/11/2018    Procedure: ANESTHESIA CARDIOVERSION;  Cardioversion;  Surgeon: GENERIC ANESTHESIA PROVIDER;  Location: [facility] OR     ANESTHESIA CARDIOVERSION N/A 10/16/2018    Procedure: Anesthesia Cardioversion ;  Surgeon: GENERIC ANESTHESIA PROVIDER;  Location: [facility] OR     APPENDECTOMY       BIOPSY      hemorrhoidectomy     ENT SURGERY      tonsillectomy     GYN SURGERY      3 D & C's     HYSTERECTOMY, PAP NO LONGER INDICATED       LAMINECTOMY LUMBAR ONE LEVEL N/A 10/13/2015    Procedure: LAMINECTOMY LUMBAR ONE LEVEL;  Surgeon: Fransico Toussaint MD;  Location: [facility] OR        Allergies:     Allergies   Allergen Reactions     Cephalexin Hcl Diarrhea     Gabapentin Other (See Comments)     Dizzsiness     Naproxen GI Disturbance     Perfume      Macrobid [Nitrofurantoin Anhydrous]      Possibly related to lung disease      Seasonal Allergies      Sulfa Drugs      Throat swelling     Xanax [Alprazolam] Other (See Comments)     Dizziness      Ciprofloxacin Itching and Rash        Outpatient Medications:       No current facility-administered medications on file prior to encounter.   Current Outpatient Prescriptions on File Prior to Encounter:  Abatacept (ORENCIA IV) Inject into the vein every 28  days   ACETAMINOPHEN PO Take 1,000 mg by mouth 2 times daily as needed    albuterol (2.5 MG/3ML) 0.083% neb solution Take 1 vial by nebulization every 6 hours as needed for shortness of breath / dyspnea or wheezing   apixaban ANTICOAGULANT (ELIQUIS) 2.5 MG tablet Take 1 tablet (2.5 mg) by mouth 2 times daily   Artificial Tear Ointment (REFRESH P.M.) OINT Apply  to eye. Daily at bedtime    Ascorbic Acid (VITAMIN C PO) Take 500 mg by mouth daily    azaTHIOprine (IMURAN) 50 MG tablet Take 1 tablet (50 mg) by mouth daily   Blood Pressure Monitor KIT 1 kit daily as needed   calcium-vitamin D (CALTRATE) 600-400 MG-UNIT per tablet Take 1 tablet by mouth 2 times daily    carvedilol (COREG) 3.125 MG tablet Take 1 tablet (3.125 mg) by mouth 2 times daily (with meals)   cetirizine (ZYRTEC ALLERGY) 10 MG tablet Take 10 mg by mouth At Bedtime   COMPOUNDED NON-CONTROLLED SUBSTANCE (CMPD RX) - PHARMACY TO MIX COMPOUNDED MEDICATION Estriol 1mg/gram. Place 1 gram vaginally daily for two weeks, then vaginally twice weekly after.   fish oil-omega-3 fatty acids (FISH OIL) 1000 MG capsule Take 2 g by mouth daily. 2 capsules daily    folic acid (FOLVITE) 1 MG tablet Take 1 tablet (1 mg) by mouth daily   furosemide (LASIX) 20 MG tablet Take 40mg in the AM and 20mg in the PM   hydrocortisone 2.5 % cream Apply BID to affected region(s) for 7-10 days.   Hypromellose (GENTEAL MILD OP) Apply  to eye daily.   levothyroxine (SYNTHROID/LEVOTHROID) 75 MCG tablet TAKE ONE TABLET BY MOUTH ONCE DAILY   Multiple Vitamins-Minerals (PRESERVISION AREDS PO) Take 1 tablet by mouth daily    nitroglycerin (NITROSTAT) 0.4 MG SL tablet Place 1 tablet (0.4 mg) under the tongue every 5 minutes as needed for chest pain   order for DME Dispense SP Magdy   order for DME Equipment being ordered: Dispense baffle, for use with nebulizer.   order for DME Equipment being ordered: Nebulizer. Use with Albuterol.   order for DME Equipment being ordered: Dispense  "face mask.Mrs. Spicer is immunosuppressed due to rheumatoid arthritis.   ranibizumab (LUCENTIS) 0.3 MG/0.05ML SOLN 0.3 mg by Intravitreal route Every 6 weeks   ranitidine (ZANTAC) 150 MG tablet Take 1 tablet (150 mg) by mouth 2 times daily   saccharomyces boulardii (FLORASTOR) 250 MG capsule Take 250 mg by mouth daily   senna-docusate (SENOKOT-S;PERICOLACE) 8.6-50 MG per tablet Take 2 tablets by mouth At Bedtime Hold if diarrhea occurs.   spironolactone (ALDACTONE) 25 MG tablet Take 0.5 tablets (12.5 mg) by mouth daily   trimethoprim (TRIMPEX) 100 MG tablet Take 0.5 tablets (50 mg) by mouth daily        Family History:     Family History   Problem Relation Age of Onset     Hypertension Mother      Psychotic Disorder Father      Diabetes Son      Diabetes Daughter      Blood Disease Daughter         Social History:     Social History   Substance Use Topics     Smoking status: Never Smoker     Smokeless tobacco: Never Used     Alcohol use Yes      Comment: rare wine           Physical Exam:   Blood pressure 144/89, pulse 65, temperature 98.1  F (36.7  C), temperature source Oral, resp. rate 18, height 1.6 m (5' 3\"), weight 70.8 kg (156 lb), SpO2 99 %, not currently breastfeeding.  Temp (24hrs), Av.1  F (36.7  C), Min:98.1  F (36.7  C), Max:98.1  F (36.7  C)      Gen: no acute distress  HEENT: no scleral icterus, pupils equal and reactive to light, moist mucous membranes, no nasal discharge.  NECK: JVP[AG1.1] 14[AG1.3]  CARDIOVASCULAR: normal rate, S1/S2 normal, no murmurs, rubs or gallops   RESPIRATORY:[AG1.1] bilateral coarse crackles[AG1.3]   ABDOMEN:[AG1.1] mildly distended non tender to palpation[AG1.3]  EXTREMITIES: peripheral pulses normal, no peripheral edema, warm, capillary refill < 2 seconds  Skin: no ecchymoses, no rashes  NEURO:[AG1.1] grossly intact[AG1.3]  PSYCH: affect appropriate      Data:   CMP  Recent Labs  Lab 18  1558      POTASSIUM 3.8   CHLORIDE 104   CO2 26   ANIONGAP 7   GLC " "110*   BUN 41*   CR 1.69*   GFRESTIMATED 29*   GFRESTBLACK 35*   FATOUMATA 7.8*   PROTTOTAL 7.5   ALBUMIN 3.5   BILITOTAL 0.4   ALKPHOS 65   AST 21   ALT 21     CBC  Recent Labs  Lab 11/28/18  1558   WBC 6.2   RBC 3.74*   HGB 12.4   HCT 38.3   *   MCH 33.2*   MCHC 32.4   RDW 13.6        INRNo lab results found in last 7 days.  Arterial Blood GasNo lab results found in last 7 days.     EKG:[AG1.1]    ECG shows baseline a flutter with intermittently paced rythm         Remote device check 11/26/2018: \"Patient has a Medtronic dual lead pacemaker.  Normal pacemaker function.  4 AT/AF episodes recorded since 11/17/18 - < 1 min - > 6 days in duration.  AF burden = 74.3%.  Patient is taking Eliquis.  No VHR episodes recorded.  Presenting EGM = AF with  @ 62 bpm.  AP = 24.5%.   = 63%.  Estimated battery longevity to ZENAIDA = 7 years.\"[AG1.3]      Imaging:    CXR[AG1.1] Mildly increased bibasilar opacities which may represent  atelectasis versus infection versus progression of interstitial lung  disease.[AG1.3]    Echo[AG1.1] 10/9/18    Global and regional left ventricular function is normal with an EF of 55-60%.  The right ventricle is not well visualized.  Mild to moderate tricuspid insufficiency is present.  There is mild pulmonary hypertension.  The inferior vena cava was normal in size.  This study was compared with the study from 8/28/14 .  There has been no change.[AG1.2]    Echo 10/16/2018  The Ejection Fraction is estimated at 55-60%.  Global right ventricular function is normal.  Moderate to severe mitral insufficiency is present.  Moderate tricuspid insufficiency is present.  Multiple MR jets noted. There is blunted systolic forward flow in 2 of the  pulmonary veins. MR volume is estimated at 34ml, however this likely  underestimates MR due to multiple jets.     Compared to prior TTE on 10/9/2018, MR appears to be worse.[AG1.4]    PFT  -2/14/2018 mild restriction and mild diffusion defect[AG1.3]       " Assessment/Plan:   Linda Spicer is a[AG1.1]n 88 yo F with a  PMH of HFpEF, PAF/Afluter (CHADDSVASC4 on eliquis) s/p multiple cardioversions currently on amiodarone, tachy lisa s/p PPM 2/17 rheumatoid pulmonary fibrosis, CKD, hypothyroidism,  who presents with fatigue and shortness of breath.     HFpEF  - ADHF -> she presents with increased BNP and shortness of breath worsening for the past week  - she has been in flutter frequently and presents in flutter she does go in and out of it per last pacer check, rates are controlled though she may be very symptomatic depending on her atrial kick. She has been seen by EP and was tried on amiodarone which was discontinue given she keeps going in and out of AF vs progression of MR to severe per last YOLANDA 10/16   - not a candidate for Ablation given co morbidities per EP notes  - consider repeat DCCV  - on spironolactone 12.5 mg daily       AF  - on apixaban for anticoagulation  - with very frequent cardioversions 9/14/18,10/11/18, 10/16/18  - rate controlled   - CHADSVASC4 on eliquis   - per device check has had 4 episodes since 11/17 1min->6 days   - given use of amiodarone and parenchymal involvement of rheumatic disease will order PFT's to ensure etiology of shortness of breath is not worsening lung function    Severe MR  - could benefit from afterload reduction and better bp control  - will use hydral/isordil given NEIL and need for diuresis    chronic  Rheumatoid disease with pulmonary involvement on azathioprine   Hypothyroidism- continue thyroxine      Nutrition:heart healthy diet  DVT ppx:on apixaban  Stress Ulcer ppx:ranitidine    Case to be staffed in the am[AG1.4]    Lisa Thompson[AG1.5]   Cardiology fellow, PGY-5[AG1.4]      Revision History        User Key Date/Time User Provider Type Action    > AG1.5 11/29/2018  5:18 AM Lisa Hammonds MD Fellow Sign     AG1.4 11/29/2018  4:56 AM Lisa Hammonds MD Fellow      AG1.3  11/28/2018  8:38 PM Lisa Hammonds MD Fellow      AG1.2 11/28/2018  8:16 PM Lisa Hammonds MD Fellow      AG1.1 11/28/2018  7:34 PM Lisa Hammonds MD Fellow                      Discharge Summaries      Discharge Summaries by Peggy Ramos MD at 12/4/2018  2:39 PM     Author:  Peggy Ramos MD Service:  Cardiology Author Type:  Resident    Filed:  12/4/2018  2:40 PM Date of Service:  12/4/2018  2:39 PM Creation Time:  12/4/2018  2:29 PM    Status:  Attested :  Peggy Ramos MD (Resident)    Cosigner:  Talat Littlejohn MD at 12/4/2018  2:40 PM        Attestation signed by Talat Littlejohn MD at 12/4/2018  2:40 PM        Attestation:  I have seen and examined the patient with the house staff on December 4, 2018 and agree with the above outlined hospital course and discharge plan.      Greater than 30 minutes were spent in discharge planning between patient education, medication teaching, and in the coordination of care to outpatient providers.    Talat Littlejohn MD  Cardiology                                   [facility] Discharge Summary- Cardiology     Linda Spicer MRN# [id number]   Age: 87 year old YOB: 1931     Date of Admission:  11/28/2018  Date of Discharge::  12/4/2018  Admitting Physician:  Quinn Nieves MD  Discharge Physician:  Peggy Ramos MD          Admission Diagnoses:[CB1.1]   Near syncope [R55]  Light headedness [R42]  Fatigue, unspecified type [R53.83]  Atrial flutter, unspecified type (H) [I48.92][CB1.2]          Discharge Diagnosis:   Dyspnea on exertion  Chronic heart failure with preserved ejection fracture   Rheumatoid pulmonary fibrosis  Light headedness   Near syncope  Atrial fibrillation, on anticoagulation   Mild to moderate mitral regurgitation           Medications Prior to Admission:[CB1.1]     Prescriptions Prior to Admission   Medication Sig  Dispense Refill Last Dose     denosumab (PROLIA) 60 MG/ML SOLN injection Inject 60 mg Subcutaneous every 6 months    11/26/2018 at few days ago     fish oil-omega-3 fatty acids (FISH OIL) 1000 MG capsule Take 1 g by mouth 2 times daily    11/28/2018 at am     Blood Pressure Monitor KIT 1 kit daily as needed 1 kit 0 Unknown at Unknown time     order for DME Dispense SP Walker-small 1 each 0 Unknown at Unknown time     order for DME Equipment being ordered: Dispense baffle, for use with nebulizer. 1 each 0 Unknown at Unknown time     order for DME Equipment being ordered: Nebulizer. Use with Albuterol. 1 each 0 Unknown at Unknown time     order for DME Equipment being ordered: Dispense face mask.  Mrs. Spicer is immunosuppressed due to rheumatoid arthritis. 1 Box 11 Unknown at Unknown time     ranibizumab (LUCENTIS) 0.3 MG/0.05ML SOLN 0.3 mg by Intravitreal route Every 6 weeks   Unknown at Unknown time     [DISCONTINUED] COMPOUNDED NON-CONTROLLED SUBSTANCE (CMPD RX) - PHARMACY TO MIX COMPOUNDED MEDICATION Estriol 1mg/gram. Place 1 gram vaginally daily for two weeks, then vaginally twice weekly after. 30 g 6 Past Week at few days ago     [DISCONTINUED] hydrocortisone 2.5 % cream Apply topically 2 times daily as needed   prn at prn     [DISCONTINUED] Multiple Vitamins-Minerals (PRESERVISION AREDS PO) Take 1 tablet by mouth 2 times daily    11/28/2018 at am     [DISCONTINUED] ranitidine (ZANTAC) 150 MG tablet Take 150 mg by mouth At Bedtime   11/27/2018 at pm     [DISCONTINUED] saccharomyces boulardii (FLORASTOR) 250 MG capsule Take 250 mg by mouth daily   11/28/2018 at afternoon     [DISCONTINUED] triamcinolone (KENALOG) 0.025 % cream Apply topically daily as needed for irritation   prn at prn[CB1.3]             Discharge Medications:[CB1.1]     Current Discharge Medication List      CONTINUE these medications which have CHANGED    Details   abatacept (ORENCIA) 250 MG injection Inject 750 mg into the vein every 28  days  Qty: 3 each, Refills: 0    Comments: Last dose reportedly on 11/26. Will likely be due for next dose on 12/24. Please discuss with outpatient rheumatology provider.  Associated Diagnoses: Rheumatoid arthritis involving multiple sites with positive rheumatoid factor (H)      acetaminophen (TYLENOL) 500 MG tablet Take 1 tablet (500 mg) by mouth every 6 hours as needed for mild pain or fever    Associated Diagnoses: Rheumatoid arthritis involving multiple sites with positive rheumatoid factor (H)      albuterol (PROVENTIL) (2.5 MG/3ML) 0.083% neb solution Take 1 vial (2.5 mg) by nebulization every 6 hours as needed for shortness of breath / dyspnea or wheezing  Qty: 360 mL    Associated Diagnoses: ILD (interstitial lung disease) (H)      apixaban ANTICOAGULANT (ELIQUIS) 2.5 MG tablet Take 1 tablet (2.5 mg) by mouth 2 times daily  Qty: 180 tablet, Refills: 3    Associated Diagnoses: Atrial flutter, paroxysmal (H)      azaTHIOprine (IMURAN) 50 MG tablet Take 1 tablet (50 mg) by mouth daily  Qty: 90 tablet, Refills: 2    Associated Diagnoses: Rheumatoid arthritis involving multiple sites with positive rheumatoid factor (H)      calcium carbonate-vitamin D (OS-FATOUMATA) 500-400 MG-UNIT tablet Take 1 tablet by mouth daily  Qty: 60 tablet    Associated Diagnoses: Rheumatoid arthritis involving multiple sites with positive rheumatoid factor (H)      Carboxymethylcellulose Sod PF (CELLUVISC/REFRESH LIQUIGEL) 1 % ophthalmic gel Place 1 drop into both eyes daily as needed for dry eyes  Qty: 1 Bottle    Associated Diagnoses: Dry eyes      carvedilol (COREG) 3.125 MG tablet Take 1 tablet (3.125 mg) by mouth 2 times daily (with meals)  Qty: 60 tablet, Refills: 11    Comments: Hold if SBP<90 or HR<60 and discuss with MD  Associated Diagnoses: Heart failure with preserved ejection fraction, NYHA class I (H); Benign essential hypertension      cetirizine (ZYRTEC ALLERGY) 10 MG tablet Take 1 tablet (10 mg) by mouth At Bedtime  Qty: 30  tablet    Associated Diagnoses: Seasonal allergic rhinitis due to other allergic trigger      folic acid (FOLVITE) 1 MG tablet Take 1 tablet (1 mg) by mouth daily  Qty: 90 tablet, Refills: 3    Associated Diagnoses: Oral aphthae      furosemide (LASIX) 20 MG tablet Take 40mg in the AM and 20mg in the PM  Qty: 120 tablet, Refills: 3    Associated Diagnoses: Heart failure with preserved ejection fraction, NYHA class I (H)      hypromellose (GENTEAL) 0.3 % SOLN ophthalmic solution Place 1 drop into both eyes daily as needed for dry eyes    Associated Diagnoses: Dry eyes      levothyroxine (SYNTHROID/LEVOTHROID) 75 MCG tablet Take 1 tablet (75 mcg) by mouth daily  Qty: 90 tablet, Refills: 1    Associated Diagnoses: Hypothyroidism, unspecified type      ranitidine (ZANTAC) 150 MG tablet Take 1 tablet (150 mg) by mouth 2 times daily  Qty: 60 tablet    Associated Diagnoses: Gastroesophageal reflux disease without esophagitis      senna-docusate (SENOKOT-S/PERICOLACE) 8.6-50 MG tablet Take 1 tablet by mouth daily as needed for constipation  Qty: 100 tablet    Associated Diagnoses: Irritable bowel syndrome, unspecified type      spironolactone (ALDACTONE) 25 MG tablet Take 0.5 tablets (12.5 mg) by mouth daily  Qty: 30 tablet, Refills: 1    Associated Diagnoses: Heart failure with preserved ejection fraction, NYHA class I (H)      trimethoprim (TRIMPEX) 100 MG tablet Take 0.5 tablets (50 mg) by mouth daily  Qty: 45 tablet, Refills: 1    Associated Diagnoses: Recurrent UTI      vitamin C (ASCORBIC ACID) 500 MG tablet Take 1 tablet (500 mg) by mouth daily  Qty: 30 tablet    Associated Diagnoses: Vitamin deficiency      nitroGLYcerin (NITROSTAT) 0.4 MG sublingual tablet Place 1 tablet (0.4 mg) under the tongue every 5 minutes as needed for chest pain  Qty: 25 tablet, Refills: 0    Comments: Michelle SAWYER if use is required  Associated Diagnoses: Acute chest pain         CONTINUE these medications which have NOT CHANGED    Details    denosumab (PROLIA) 60 MG/ML SOLN injection Inject 60 mg Subcutaneous every 6 months       fish oil-omega-3 fatty acids (FISH OIL) 1000 MG capsule Take 1 g by mouth 2 times daily       Blood Pressure Monitor KIT 1 kit daily as needed  Qty: 1 kit, Refills: 0    Associated Diagnoses: CHF (congestive heart failure) (H)      !! order for DME Dispense SP Walker-small  Qty: 1 each, Refills: 0    Associated Diagnoses: Pain of toe of right foot; Status post bunionectomy      !! order for DME Equipment being ordered: Dispense baffle, for use with nebulizer.  Qty: 1 each, Refills: 0    Associated Diagnoses: Pneumonia of left upper lobe due to Mycoplasma pneumoniae      !! order for DME Equipment being ordered: Nebulizer. Use with Albuterol.  Qty: 1 each, Refills: 0    Associated Diagnoses: Hypoxia      !! order for DME Equipment being ordered: Dispense face mask.  Mrs. Spicer is immunosuppressed due to rheumatoid arthritis.  Qty: 1 Box, Refills: 11    Associated Diagnoses: Rheumatoid arthritis involving left wrist with positive rheumatoid factor (H)      ranibizumab (LUCENTIS) 0.3 MG/0.05ML SOLN 0.3 mg by Intravitreal route Every 6 weeks       !! - Potential duplicate medications found. Please discuss with provider.      STOP taking these medications       COMPOUNDED NON-CONTROLLED SUBSTANCE (CMPD RX) - PHARMACY TO MIX COMPOUNDED MEDICATION Comments:   Reason for Stopping:         hydrocortisone 2.5 % cream Comments:   Reason for Stopping:         Multiple Vitamins-Minerals (PRESERVISION AREDS PO) Comments:   Reason for Stopping:         saccharomyces boulardii (FLORASTOR) 250 MG capsule Comments:   Reason for Stopping:         triamcinolone (KENALOG) 0.025 % cream Comments:   Reason for Stopping:[CB1.3]                    Consultations:     Consultation during this admission received from neurology and pulmonary medicine          Brief History of Illness:     Linda Spicer is an 86 y/o F with history of HFpEF, paroxysmal  Afib/Aflutter (on apixaban) s/p multiple failed cardioversions, tachy lisa s/p PPM (2/17), rheumatoid pulmonary fibrosis, CKD, and hypothyroidism who presented with fatigue and shortness of breath. Please see admission H&P for additional information.           Hospital Course:     # Dyspnea on exertion   # Chronic HFpEF  # Rheumatoid disease pulmonary fibrosis  Dyspnea likely multifactorial in the setting of known HFpEF, pulmonary fibrosis, atrial fibrillation and deconditioning. Symptoms improved somewhat s/p diuresis. Appears euvolemic on exam near .  lbs. Currently in NSR, continuing rate control for Afib (not candidate for ablation given co-morbidities per EP notes).   - Pulmonology consulted: Given stable CT chest, it is unlikely that pulmonary fibrosis has progressed or flared, although she has limited pulmonary reserves. Plan for follow up with Dr. Perlman in clinic.   - Neurology consulted: MG workup in process. Follow up with general neurology in clinic (new referral).   - Diuresis:continue lasix 40mg/20mg  - Ald ant: continue spironalactone 12.5mg daily      # Lightheadedness  Felt to be secondary to vasovagal etiolgoy vs Afib vs orthostatic (although orthostatic vitals negative). Afib discussed below.  - PPM rate increased to 70bpm on 12/3 without improvement in symptoms  - Neurology consulted. Symptoms are likely secondary to a combination of pulmonary and cardiac factors.  MG workup ongoing. Follow up with neurology in clinic for additional workup and evaluation.  - Fall precautions, compression stockings recommended.  - PT/OT recommended  - Assistance with ambulation as needed      # Atrial fibrillation   CHADsVASC 4, on apixaban. History of multiple cardioversions 9/14/18,10/11/18, 10/16/18. Has previously tried amiodarone but discontinued due to failure to significantly suppress AF. Not an ablation candidate. May be contributing to dyspnea discussed above. Per device check, has had 4 episodes  since 11/17 ranging from 1 minute to 6 days. Continue to follow with cardiology as scheduled.      # Mild to moderate MR  May benefit from afterload reduction and improved BP control; however, this has been complicated by presyncope. Previously started isodil/hyral this admission, now holding given recurrent pre-syncopal episodes.  - Recommend close outpatient follow up with cardiology for consideration of afterload reduction if pre-syncopal events resolve      # Macrocytic anemia   Normal B12, MMA pending. No active bleeding. Recommend ongoing workup as outpatient.      # Rheumatoid disease   On azathioprine PTA, continued during admission. Also on abatacept q28 days continue per rheumatology.      # Hypothyroidism   - Continue levothyroxine           Discharge Instructions and Follow-Up:   Discharge diet: 2g salt   Discharge activity: Activity as tolerated. Ambulate with assistance.    Discharge follow-up: Follow up with TCU provider within one week. Follow up with Nursing home physician. Follow up with Cardiology (Dr. Carrasquillo) as scheduled on 12/19 or within one month of discharge. Follow up with Neurology (new referral) in 1-2 months. Follow up with Pulmonology (Dr. Perlman) within 1 month. Follow up with Rheumatology as scheduled.           Discharge Disposition:   Discharged to [facility]      Seen and discussed with Dr. Littlejohn, staff physician.     Peggy Ramos MD   Internal Medicine, PGY2  [phone][CB1.1]        Revision History        User Key Date/Time User Provider Type Action    > CB1.3 12/4/2018  2:40 PM Peggy Ramos MD Resident Sign     CB1.2 12/4/2018  2:30 PM Peggy Ramos MD Resident      CB1.1 12/4/2018  2:29 PM Peggy Ramos MD Resident                      Consult Notes      Consults by Romina Garcia MD at 12/1/2018  7:36 AM     Author:  Romina Garcia MD Service:  Pulmonology Author Type:  Fellow    Filed:   12/1/2018  4:53 PM Date of Service:  12/1/2018  7:36 AM Creation Time:  12/1/2018  7:36 AM    Status:  Attested :  Romina Garcia MD (Fellow)    Cosigner:  Stefania Reeder MD at 12/1/2018  4:56 PM         Consult Orders:    1. Pulmonary General Adult IP Consult: Patient to be seen: Routine within 24 hrs; Call back #: [id number]; hx afib s/p ppm, HFpEF, rheumatoid pulmonary fibrosis with significant shortness of breath/dizziness without clear cardiac cause. Related to pulm fi... [[id number]] ordered by Fredrick Will PA-C at 11/30/18 1147           Attestation signed by Stefania Reeder MD at 12/1/2018  4:56 PM        Attending statement:    The patient was seen and examined by me with Dr. Garcia.  The case was discussed at length.    Vitals, lab results and imaging from today were reviewed.  The note reflects our joint assessment and plan.  Patient with worsening dyspnea despite diuresis.   -- Agree with non contrast chest CT.  Stefania Reeder MD MPH                                 [facility]    Pulmonary, Allergy, Critical Care and Sleep Medicine    Initial Consultation[MS1.1]  12/01/2018[MS1.2]    Linda Spicer MRN# [id number]   Age: 87 year old YOB: 1931     Date of Admission: 11/28/2018    Primary care provider: Tre Lockett     Assessment and Recommendations:[MS1.1]    87-year-old female with long-standing rheumatoid arthritis and associated ILD, A. fib on anticoagulation, diastolic CHF, CKD and hypothyroidism is admitted to the hospital with acute respiratory distress secondary to uncontrolled A. fib and CHF exacerbation.  Cardiology consulted pulmonary to evaluate contribution of her pulmonary fibrosis to her current dyspnea. Her CRP and ESR are WNL. BNP 2400.  It does not look like that patient has an ILD flare[MS1.3] or an[MS1.4] infection.  Given her stable PFTs it is unlikely that her ILD progressed.  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     Continue diuresis as tolerated and a fib rate control.     Follow up with Dr. Perlman of pulmonary at [facility] after discharge.[MS1.4]     Seen and discussed with Staff [MS1.1] Varinder[MS1.3] MD. Please see staff addendum for any changes/edits.     Romina Garcia  Pulmonary and Critical Care Fellow   Pager [phone]    Chief Complaint and History of Present Illness:    CC:  Dyspnea and weakness.   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 Associated with dizziness, presyncope and palpitations. In the ED the patient was hypertensive 154/59 Hr in the 60's and afebrile. Labs were significant for troponin was negative SCr 1.69 BUN 41 baseline around 1.3-1.4, Nt pro BNP is elevated at 2473. She has WBC of 12.4 hgb at baseline of 12.  The patient was treated with 40mg Iv lasix and sent to the floor. She was being diuresed by cardiology but her creat started rising. Repeat echo with mild-moderate mitral insufficiency. Device interrogation showing normal function, 80% AF burden.[MS1.1]     Patient says that her rheumatoid arthritis affects her hands arms knees and feet.  She feels her rheumatoid arthritis is  controlled.  She has not been on prednisone since 2015 when she stopped going to [facility].  Patient feels that she had exposure to formaldehyde the air which caused her to become short of breath.  Patient would feel markedly better when she would come back.  Has not been there since 2015.  Patient follows up with pulmonary and her PFTs show stable FVC and FEV1 over the course of last 4 years.  Patient now reports that for the last 1 week she has had worsening shortness of breath which starts if she is talking or on exertion.  Patient is not sure if this is related to her A. fib or her lung disease.  Patient has been diuresed while she was in the hospital her.  She is -1200 mL.  Patient was on 2 L oxygen on admission and right now she is on room air.[MS1.3]    Pt has RA[MS1.1] since 1973 and associated[MS1.3] ILD[MS1.1].  She[MS1.3] is on abatacept and[MS1.1] Imuran currently[MS1.3] last seen by Dr. Davenport Feb 2018. Previously treated with hydroxychloroquine 200 mg daily (stopped previously because of macular degeneration), methotrexate (leg cramps), Cimzia (stopped due to recurrent basal cell carcinoma and hx of heart failure). Has sulfa allergy. Leflunomide avoided because of ILD.  Currently on Orencia IV and azathioprine 50mg daily. Pt is on trimethoprim only ppx.     Pt is being followed up by Dr. Comer of pul and was last seen in Jan 2018.  PFT I feb 2018 show stable FVC and FEV1. TLC still in normal limit and normal DLCO.     Review of Systems:  Complete 12 point ROS negative unless mentioned in HPI    Histories, Prior to Admission Medications, Allergies:    Past Medical History:[MS1.1]  Past Medical History:   Diagnosis Date     Adjustment disorder with anxious mood 5/18/2015     Advanced directives, counseling/discussion 8/30/2012    Patient states has Advance Directive and will bring in a copy to clinic. 8/30/2012   Tevin May  [facility] Medical Assistant \       Anemia 9/25/2015     Basal cell  cancer      CHF (congestive heart failure) (H) 9/18/2014     CKD (chronic kidney disease) stage 3, GFR 30-59 ml/min (H) 9/29/2015     DDD (degenerative disc disease), lumbar 9/25/2015     Diffuse idiopathic pulmonary fibrosis (H) 5/6/2013     Encounter for palliative care 5/18/2015     History of blood transfusion 9/29/2015     Hypertension goal BP (blood pressure) < 140/90 9/7/2012     Hypothyroid 9/7/2012     Irritable bowel syndrome 10/29/2013     Macular degeneration      Macular degeneration, 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     Nondisplaced spiral fracture of shaft of humerus      Osteoporosis 8/13/2013     Imo Update utility     RA (rheumatoid arthritis) (H) 5/7/2013     Rheumatic fever      Shingles      Spinal stenosis of lumbar region with neurogenic claudication 9/14/2015[MS1.2]     Past Surgical History:[MS1.1]  Past Surgical History:   Procedure Laterality Date     ANESTHESIA CARDIOVERSION N/A 9/14/2018    Procedure: ANESTHESIA CARDIOVERSION;;  Surgeon: GENERIC ANESTHESIA PROVIDER;  Location: [facility] OR     ANESTHESIA CARDIOVERSION N/A 10/11/2018    Procedure: ANESTHESIA CARDIOVERSION;  Cardioversion;  Surgeon: GENERIC ANESTHESIA PROVIDER;  Location: [facility] OR     ANESTHESIA CARDIOVERSION N/A 10/16/2018    Procedure: Anesthesia Cardioversion ;  Surgeon: GENERIC ANESTHESIA PROVIDER;  Location: [facility] OR     APPENDECTOMY       BIOPSY      hemorrhoidectomy     ENT SURGERY      tonsillectomy     GYN SURGERY      3 D & C's     HYSTERECTOMY, PAP NO LONGER INDICATED       LAMINECTOMY LUMBAR ONE LEVEL N/A 10/13/2015    Procedure: LAMINECTOMY LUMBAR ONE LEVEL;  Surgeon: Fransico Toussaint MD;  Location: [facility] OR[MS1.2]     Past Social History:[MS1.1]  Social History     Social History     Marital status:      Spouse name: N/A     Number of children: N/A     Years of education: N/A     Occupational History     Not on file.     Social History Main Topics     Smoking status: Never Smoker     Smokeless tobacco: Never Used     Alcohol  use Yes      Comment: rare wine      Drug use: No     Sexual activity: No     Other Topics Concern     Not on file     Social History Narrative    . Has six children. She enjoys bridge and genealogy. Her  has cancer. Her son has financial and alcohol issues.[MS1.2]     ETOH:[MS1.1]  Occassional.[MS1.3]   Tobacco:[MS1.1] no[MS1.3]  Significant inhalational exposures:[MS1.1] no[MS1.3]  PPD/TB exposure status:[MS1.1] fernando.[MS1.3]   Family History:[MS1.1]  Family History   Problem Relation Age of Onset     Hypertension Mother      Psychotic Disorder Father      Diabetes Son      Diabetes Daughter      Blood Disease Daughter[MS1.2]      Family history negative for[MS1.1] other[MS1.3] ILD, sarcoidosis, lung cancer, rheumatoid arthritis lupus or other connective tissue diseases as per patient's knowledge  Medications:[MS1.1]    apixaban ANTICOAGULANT  2.5 mg Oral BID     azaTHIOprine  50 mg Oral Daily     carvedilol  3.125 mg Oral BID w/meals     folic acid  1 mg Oral Daily     hydrALAZINE  10 mg Oral TID     isosorbide dinitrate  10 mg Oral TID AC     levothyroxine  75 mcg Oral Daily     ranitidine  150 mg Oral BID     spironolactone  12.5 mg Oral Daily     trimethoprim  50 mg Oral Daily     albuterol, - MEDICATION INSTRUCTIONS -, senna-docusate[MS1.2]  Allergies:[MS1.1]     Allergies   Allergen Reactions     Cephalexin Hcl Diarrhea     Gabapentin Other (See Comments)     Dizzsiness     Naproxen GI Disturbance     Perfume      Macrobid [Nitrofurantoin Anhydrous]      Possibly related to lung disease      Seasonal Allergies      Sulfa Drugs      Throat swelling     Xanax [Alprazolam] Other (See Comments)     Dizziness      Ciprofloxacin Itching and Rash[MS1.2]       Physical Exam:[MS1.1]    Temp:  [96.7  F (35.9  C)-98.3  F (36.8  C)] 98.1  F (36.7  C)  Heart Rate:  [54-69] 62  Resp:  [16-18] 18  BP: (111-146)/(64-95) 135/95  SpO2:  [95 %-99 %] 99 %[MS1.2]    Intake/Output Summary (Last 24 hours) at  18 0737  Last data filed at 18 0500   Gross per 24 hour   Intake              920 ml   Output              800 ml   Net              120 ml     General: laying in bed in NAD[MS1.1] on room air but would get dyspnea on talking.[MS1.3]   HEENT: anicteric, moist mucosa  Neck: no palpable lymphadenopathy,[MS1.1] JVP ~ mid neck[MS1.3]   Chest:[MS1.1] tachypnea. NVB with basal crackles dry no wheeze.[MS1.3]   Cardiac: RRR no murmurs  Abdomen: Soft, flat, non tender, active BS  Extremities: No LE Edema  Neuro: A&Ox3, no focal deficits   Skin: no rash noted    Laboratory, imaging, and microbiologic data:    All laboratory and imaging data reviewed.[MS1.1]      Recent Labs  Lab 18  1022 18  1646 18  0651 18  1558   WBC 6.4  --  6.7 6.2   HGB 11.6*  --  12.2 12.4   *  --  102* 102*     --  249 270    137 141 136   POTASSIUM 3.7 4.0 3.6 3.8   CHLORIDE 105 104 106 104   CO2 25 26 28 26   BUN 42* 47* 38* 41*   CR 1.71* 1.79* 1.57* 1.69*   ANIONGAP 7 7 7 7   FATOUMATA 7.5* 7.5* 7.7* 7.8*   * 106* 93 110*   ALBUMIN  --   --   --  3.5   PROTTOTAL  --   --   --  7.5   BILITOTAL  --   --   --  0.4   ALKPHOS  --   --   --  65   ALT  --   --   --  21   AST  --   --   --  21   TROPI  --   --   --  <0.015     No lab results found in last 7 days.[MS1.2]  Recent Results (from the past 4320 hour(s))   Echo limited (Adults Only)    Narrative    [id number]  [id number]  [id number]  [id number]^CHAD^YANDY^JORGE L           [facility],[place]  Echocardiography Laboratory  [address] [place] [id number]     Name: RG WALTER  MRN: [id number]  : 1931  Study Date: 2018 11:24 AM  Age: 87 yrs  Gender: Female  [place] [facility] [place] For Study: Mitral Valve Disorder  Ordering Physician: YANDY BONILLA  Performed By: Elver Smith RDCS     BSA: 1.7 m2  Height: 63 in  Weight: 156 lb  BP: 114/63  mmHg  _____________________________________________________________________________  __        Procedure  Limited Portable Echo Adult.  _____________________________________________________________________________  __        Interpretation Summary  Limited study.  Normal biventricular size and systolic function. The Ejection Fraction is  estimated at 55-60%.  Mild to moderate mitral insufficiency is present.  Mild to moderate tricuspid insufficiency is present.  Estimated pulmonary artery systolic pressure is 26 mmHg plus right atrial  pressure.  Compared to ECHO on 10/9, there are no significant changes. Mitral  regurgitation less prominent compared to recent YOLANDA (10/16).     _____________________________________________________________________________  __        Left Ventricle  Left ventricular size is normal. There is increased LV wall thickness. The  Ejection Fraction is estimated at 55-60%. No regional wall motion  abnormalities are seen.     Right Ventricle  The right ventricle is normal size. Global right ventricular function is  normal.     Atria  Moderate right atrial enlargement is present. Moderate left atrial enlargement  is present.        Mitral Valve  Mild to moderate mitral insufficiency is present.     Aortic Valve  Trace aortic insufficiency is present.     Tricuspid Valve  Mild to moderate tricuspid insufficiency is present. Estimated pulmonary  artery systolic pressure is 26 mmHg plus right atrial pressure. There are  multiple TR jets.     Pulmonic Valve  The pulmonic valve cannot be assessed.     Vessels  The inferior vena cava is normal. The thoracic aorta cannot be assessed.     Pericardium  No pericardial effusion is present.        Compared to Previous Study  Compared to ECHO on 10/9, there are no significant changes. MR on YOLANDA (10/16)  appears more severe.  _____________________________________________________________________________  __           Doppler Measurements & Calculations  TV max  P.5 mmHg  TR max adrian: 252.4 cm/sec  TR max P.5 mmHg     _____________________________________________________________________________  __           Report approved by: Blanca Adams 2018 02:40 PM      ECHO COMPLETE WITH OPTISON    Narrative    [id number]  ECH73  [id number]  [id number]^DINAH^CRYS           [facility]  Echocardiography Laboratory  [address] [place] [id number]     Name: RG WALTER  MRN: [id number]  : 1931  Study Date: 10/09/2018 01:09 PM  Age: 87 yrs  Gender: Female  Patient Location: [facility] [place]  Reason For Study: CHF  History: CHF  Ordering Physician: CRYS NIX  Referring Physician: ERIC ENGLE  Performed By: Sharonda Galo RDCS     BSA: 1.8 m2  Height: 63 in  Weight: 164 lb  BP: 180/87 mmHg  _____________________________________________________________________________  __        Procedure  Complete Portable Echo Adult. Contrast Optison. Optison (NDC #1176-9331-59)  given intravenously. Patient was given 6 ml mixture of 3 ml Optison and 6 ml  saline. 3 ml wasted.  _____________________________________________________________________________  __        Interpretation Summary  Global and regional left ventricular function is normal with an EF of 55-60%.  The right ventricle is not well visualized.  Mild to moderate tricuspid insufficiency is present.  There is mild pulmonary hypertension.  The inferior vena cava was normal in size.  This study was compared with the study from 14 .  There has been no change.     _____________________________________________________________________________  __        Left Ventricle  Global and regional left ventricular function is normal with an EF of 55-60%.  Left ventricular size is normal. Left ventricular wall thickness is normal.  Left ventricular diastolic function is indeterminate.     Right Ventricle  Likely normal RV function but unable to assess size. The right  ventricle is  not well visualized.     Atria  Both atria appear normal. The atrial septum is intact as assessed by color  Doppler .     Mitral Valve  The mitral valve is normal. Mild to moderate mitral insufficiency is present.        Aortic Valve  Aortic valve is normal in structure and function. The aortic valve is  tricuspid. Mild aortic insufficiency is present.     Tricuspid Valve  Mild to moderate tricuspid insufficiency is present. Right ventricular  systolic pressure is 40mmHg above the right atrial pressure. Mild (pulmonary  artery systolic pressure<50mmHg) pulmonary hypertension is present.     Pulmonic Valve  The pulmonic valve is normal.     Vessels  The aorta root is normal. The thoracic aorta is normal. The pulmonary artery  is normal. The inferior vena cava was normal in size with preserved  respiratory variability. Estimated mean right atrial pressure is 3 mmHg  (normal).     Pericardium  No pericardial effusion is present.        Compared to Previous Study  This study was compared with the study from 14 . There has been no  change.     Attestation  I have personally viewed the imaging and agree with the interpretation and  report as documented by the fellow, Parris Duvall, and/or edited by me.  _____________________________________________________________________________  __     MMode/2D Measurements & Calculations  IVSd: 1.1 cm  LVIDd: 3.7 cm  LVIDs: 1.9 cm  LVPWd: 1.0 cm  FS: 47.7 %  LV mass(C)d: 122.1 grams  LV mass(C)dI: 68.7 grams/m2  Ao root diam: 2.7 cm  LA dimension: 4.3 cm  asc Aorta Diam: 3.4 cm  LA/Ao: 1.6  LVOT diam: 2.0 cm  LVOT area: 3.1 cm2  LA Volume (BP): 51.3 ml     LA Volume Index (BP): 28.8 ml/m2  RWT: 0.54        Doppler Measurements & Calculations  MV E max adrian: 104.0 cm/sec  MV A max adrian: 33.8 cm/sec  MV E/A: 3.1  MV dec time: 0.14 sec  Ao V2 max: 89.2 cm/sec  Ao max PG: 3.0 mmHg  Ao V2 mean: 66.3 cm/sec  Ao mean P.0 mmHg  Ao V2 VTI: 16.6 cm  RAJ(I,D): 3.5 cm2  RAJ(V,D):  3.2 cm2  LV V1 max PG: 3.3 mmHg  LV V1 max: 90.7 cm/sec  LV V1 VTI: 18.7 cm  SV(LVOT): 58.7 ml  SI(LVOT): 33.1 ml/m2  PA acc time: 0.06 sec  TR max alvarado: 242.0 cm/sec  TR max P.7 mmHg  AV Alvarado Ratio (DI): 1.0  RAJ Index (cm2/m2): 2.0  Medial E/e': 19.5        _____________________________________________________________________________  __        Report approved by: Blanca Adams 10/09/2018 04:28 PM        PFT: 2018     [MS1.1]  PFT Latest Ref Rng & Units 2018   FVC L 1.85   FEV1 L 1.40   FVC% % 80   FEV1% % 80[MS1.2]     Romina Garcia MD  Pulmonary Critical Care Fellow   [phone]  ------------------------------------------------------------------------------------------------------------[MS1.1]     Revision History        User Key Date/Time User Provider Type Action    > [N/A] 2018  4:53 PM Romina Garcia MD Fellow Sign     MS1.4 2018  3:06 PM Romina Garcia MD Fellow Sign     MS1.3 2018 11:03 AM Romina Garcia MD Fellow      MS1.2 2018  7:37 AM Roimna Garcia MD Fellow      MS1.1 2018  7:36 AM Romina Garcia MD Fellow             Consults by Elvia Burks MD at 2018  4:41 PM     Author:  Elvia Burks MD Service:  Neurology Author Type:  Physician    Filed:  2018  8:34 AM Date of Service:  2018  4:41 PM Creation Time:  2018  4:41 PM    Status:  Signed :  Elvia Burks MD (Physician)     Consult Orders:    1. Neurology General Adult IP Consult: Patient to be seen: Routine within 24 hrs; Call back #: [id number]; hx of afib, s/p PPM, CKD, pulm fibrosis presenting with significant shortness of breath/dizziness without obvious cardiac cause for symptoms. Please... [[id number]] ordered by Fredrick Will PA-C at 18 1147                Neurology Consultation    Linda Spicer[AL1.1] MRN:   [id number][SB1.1]   87 year  old       Reason for consult:[AL1.1] Neuro team was consulted by the primary team to evaluate Linda for a possible neurological etiology to her worsening fatigue and shortness of breath that seemed to be worse in upright position than lying flat.[AM1.1]          HPI:[AL1.1]      Linda Spicer is a 87-year-old woman with PMH of HFpEF, AFib/AFlutter s/p multiple cardioversions currently anticoagulated on apixaban, 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 She reports that she has had increased shortness of breath with minimal exertion including talking, and has had several episodes of lightheadedness that have resulted in a few falls. She has had multiple CT scans and MRI during workup at [facility] for similar symptoms of lightheadedness and falls, all with no definitive findings. Recently, the shortness of breath has worsened so that she is speaking in short sentences and has limited her ability to enjoy hobbies. She describes the lightheadedness as well as vision closing in, feeling like she needs to lie down or will faint. She denies nausea, diaphoresis during this episode and does not notice if her heart is beating faster than usual. Prior to this admission, she was using a walker to get around, but has become limited in the distance she can ambulate do to her SOB and lightheadedness. When asked about whether she feels her SOB improves with lying flat, she states that she thinks that may only be because she doesn't talk while lying down. She has not noticed any muscle weakness, numbness, or tingling. No headaches or vision changes. No recent head trauma. She has had no significant weight change or change in appetite.    She lives alone in a senior independent living apartment since her 's passing in September. She says she is doing well with her grief with the support of her family and  friends. She is most concerned about her increased feelings of lightheadedness for fear of falling down while she is alone. She does has an alert bracelet and has discussed with family about moving to an assisted living facility.[AM1.1]     She has had falls and been evaluated for intracerebral hemorrhage in the past.     She has long standing dizziness that seems to have an orthostatic nature. Sometimes gets worse with medication changes.[SB1.1]                Past Medical History:[AL1.1]     Past Medical History:   Diagnosis Date     Adjustment disorder with anxious mood 5/18/2015     Advanced directives, counseling/discussion 8/30/2012    Patient states has Advance Directive and will bring in a copy to clinic. 8/30/2012   [facility], [place], operated by [facility] \       Anemia 9/25/2015     Basal cell cancer      CHF (congestive heart failure) (H) 9/18/2014     CKD (chronic kidney disease) stage 3, GFR 30-59 ml/min (H) 9/29/2015     DDD (degenerative disc disease), lumbar 9/25/2015     Diffuse idiopathic pulmonary fibrosis (H) 5/6/2013     Encounter for palliative care 5/18/2015     History of blood transfusion 9/29/2015     Hypertension goal BP (blood pressure) < 140/90 9/7/2012     Hypothyroid 9/7/2012     Irritable bowel syndrome 10/29/2013     Macular degeneration      Macular degeneration, 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     Nondisplaced spiral fracture of shaft of humerus      Osteoporosis 8/13/2013     [employer] Update utility     RA (rheumatoid arthritis) (H) 5/7/2013     Rheumatic fever      Shingles      Spinal stenosis of lumbar region with neurogenic claudication 9/14/2015[AL1.2]              Past Surgical History:[AL1.1]     Past Surgical History:   Procedure Laterality Date     ANESTHESIA CARDIOVERSION N/A 9/14/2018    Procedure: ANESTHESIA CARDIOVERSION;;  Surgeon: GENERIC ANESTHESIA PROVIDER;  Location: [facility] OR     ANESTHESIA CARDIOVERSION N/A 10/11/2018    Procedure: ANESTHESIA CARDIOVERSION;  Cardioversion;  Surgeon:  GENERIC ANESTHESIA PROVIDER;  Location: [facility] OR     ANESTHESIA CARDIOVERSION N/A 10/16/2018    Procedure: Anesthesia Cardioversion ;  Surgeon: GENERIC ANESTHESIA PROVIDER;  Location: [facility] OR     APPENDECTOMY       BIOPSY      hemorrhoidectomy     ENT SURGERY      tonsillectomy     GYN SURGERY      3 D & C's     HYSTERECTOMY, PAP NO LONGER INDICATED       LAMINECTOMY LUMBAR ONE LEVEL N/A 10/13/2015    Procedure: LAMINECTOMY LUMBAR ONE LEVEL;  Surgeon: Fransico Toussaint MD;  Location: [facility] OR[AL1.2]              Social History:[AL1.1]     Social History   Substance Use Topics     Smoking status: Never Smoker     Smokeless tobacco: Never Used     Alcohol use Yes      Comment: rare wine[AL1.2]               Family History:[AL1.1]     Family History   Problem Relation Age of Onset     Hypertension Mother      Psychotic Disorder Father      Diabetes Son      Diabetes Daughter      Blood Disease Daughter[AL1.2]               Allergies:[AL1.1]     Allergies   Allergen Reactions     Cephalexin Hcl Diarrhea     Gabapentin Other (See Comments)     Dizzsiness     Naproxen GI Disturbance     Perfume      Macrobid [Nitrofurantoin Anhydrous]      Possibly related to lung disease      Seasonal Allergies      Sulfa Drugs      Throat swelling     Xanax [Alprazolam] Other (See Comments)     Dizziness      Ciprofloxacin Itching and Rash[AL1.2]             Medications:[AL1.1]     Prescriptions Prior to Admission   Medication Sig Dispense Refill Last Dose     abatacept (ORENCIA) 250 MG injection Inject 750 mg into the vein every 28 days   11/26/2018 at Unknown time     acetaminophen (TYLENOL) 500 MG tablet Take 500-1,000 mg by mouth every 6 hours as needed for mild pain   PRN at few months     albuterol (2.5 MG/3ML) 0.083% neb solution Take 1 vial by nebulization every 6 hours as needed for shortness of breath / dyspnea or wheezing   PRN at 6 months ago     apixaban ANTICOAGULANT (ELIQUIS) 2.5 MG tablet Take 1 tablet (2.5 mg) by mouth 2  times daily 180 tablet 3 11/28/2018 at am     azaTHIOprine (IMURAN) 50 MG tablet Take 1 tablet (50 mg) by mouth daily 90 tablet 2 11/27/2018 at pm     calcium carbonate-vitamin D (OS-FATOUMATA) 500-400 MG-UNIT tablet Take 1 tablet by mouth daily   11/27/2018 at pm     Carboxymethylcellulose Sod PF (CELLUVISC/REFRESH LIQUIGEL) 1 % ophthalmic gel Place 1 drop into both eyes daily as needed for dry eyes   11/27/2018 at pm     carvedilol (COREG) 3.125 MG tablet Take 1 tablet (3.125 mg) by mouth 2 times daily (with meals) 60 tablet 11 11/28/2018 at am     cetirizine (ZYRTEC ALLERGY) 10 MG tablet Take 10 mg by mouth At Bedtime   11/27/2018 at pm     COMPOUNDED NON-CONTROLLED SUBSTANCE (CMPD RX) - PHARMACY TO MIX COMPOUNDED MEDICATION Estriol 1mg/gram. Place 1 gram vaginally daily for two weeks, then vaginally twice weekly after. 30 g 6 Past Week at few days ago     denosumab (PROLIA) 60 MG/ML SOLN injection Inject 60 mg Subcutaneous every 6 months    11/26/2018 at few days ago     fish oil-omega-3 fatty acids (FISH OIL) 1000 MG capsule Take 1 g by mouth 2 times daily    11/28/2018 at am     folic acid (FOLVITE) 1 MG tablet Take 1 tablet (1 mg) by mouth daily 90 tablet 3 11/28/2018 at am     furosemide (LASIX) 20 MG tablet Take 40mg in the AM and 20mg in the  tablet 3 11/28/2018 at afternoon     hydrocortisone 2.5 % cream Apply topically 2 times daily as needed   prn at prn     hypromellose (GENTEAL) 0.3 % SOLN ophthalmic solution Place 1 drop into both eyes daily as needed for dry eyes   PRN at PRN     levothyroxine (SYNTHROID/LEVOTHROID) 75 MCG tablet TAKE ONE TABLET BY MOUTH ONCE DAILY 90 tablet 1 11/28/2018 at am     Multiple Vitamins-Minerals (PRESERVISION AREDS PO) Take 1 tablet by mouth 2 times daily    11/28/2018 at am     ranitidine (ZANTAC) 150 MG tablet Take 150 mg by mouth At Bedtime   11/27/2018 at pm     saccharomyces boulardii (FLORASTOR) 250 MG capsule Take 250 mg by mouth daily   11/28/2018 at afternoon  "    senna-docusate (SENOKOT-S/PERICOLACE) 8.6-50 MG tablet Take 1 tablet by mouth daily as needed for constipation   PRN at PRN     spironolactone (ALDACTONE) 25 MG tablet Take 0.5 tablets (12.5 mg) by mouth daily 30 tablet 1 11/28/2018 at am     triamcinolone (KENALOG) 0.025 % cream Apply topically daily as needed for irritation   prn at prn     trimethoprim (TRIMPEX) 100 MG tablet Take 0.5 tablets (50 mg) by mouth daily 45 tablet 1 11/27/2018 at pm     vitamin C (ASCORBIC ACID) 500 MG tablet Take 500 mg by mouth daily   11/27/2018 at pm     Blood Pressure Monitor KIT 1 kit daily as needed 1 kit 0 Unknown at Unknown time     nitroglycerin (NITROSTAT) 0.4 MG SL tablet Place 1 tablet (0.4 mg) under the tongue every 5 minutes as needed for chest pain 25 tablet 0 PRN at PRN     order for DME Dispense SP Walker-small 1 each 0 Unknown at Unknown time     order for DME Equipment being ordered: Dispense baffle, for use with nebulizer. 1 each 0 Unknown at Unknown time     order for DME Equipment being ordered: Nebulizer. Use with Albuterol. 1 each 0 Unknown at Unknown time     order for DME Equipment being ordered: Dispense face mask.  Mrs. Spicer is immunosuppressed due to rheumatoid arthritis. 1 Box 11 Unknown at Unknown time     ranibizumab (LUCENTIS) 0.3 MG/0.05ML SOLN 0.3 mg by Intravitreal route Every 6 weeks   Unknown at Unknown time[AL1.2]              Review of Systems:   The 10 point Review of Systems is negative other than noted in the HPI          Physical Exam:   Vital signs:[AL1.1]  Temp: 97.6  F (36.4  C) Temp src: Oral BP: 123/65   Heart Rate: 54 Resp: 16 SpO2: 97 % O2 Device: None (Room air)   Height: 161.3 cm (5' 3.5\") Weight: 70.5 kg (155 lb 8 oz)  Estimated body mass index is 27.11 kg/(m^2) as calculated from the following:    Height as of this encounter: 1.613 m (5' 3.5\").    Weight as of this encounter: 70.5 kg (155 lb 8 oz).[AL1.2]        Constitutional :[AL1.1] pleasantly conversive older woman, " mildly fatigued[AM1.1]  Head: atraumati[AL1.1]c[AM1.1]  Eyes:[AL1.1] anicteric[AM1.1]  CV: S1/S2[AL1.1],[AM1.1] no murmurs  L[AL1.1]u[AM1.1]ng: On room air.[AL1.1] Crackles at lung bases bilaterally. Dyspnea during speech both upright and lying flat.[AM1.1]  A[AL1.1]b[AM1.1]domen: soft, non tender[AL1.1], obese[AM1.1]  Extremities:[AL1.1] Distal p[AM1.1]ulses[AL1.1] intact. Mild ankle[AM1.1] edema.  Psychiatry:[AL1.1] mildly anxious, affect congruent, cooperative[AM1.1]    Neuro[AL1.1] exam:  Mental Status:[AM1.1] Alert and oriented to place, date, self and reasons of hospitalization.  Follow commands[AL1.1].[AM1.1] Excellent recall of recent and more long term events. No aphasia or dysarthria. While she spoke she was witnessed to be taking frequent breaths every 4 words or so initially after she walked form bathroom and got back in bed. It then calmed down and she could say 8-10 words before taking a breath. At this time she was propped up to a 45 degree angle. She was then place din a supine position and she did seem to start taking more frequent breaths while talking but did not personally notice. Certainly no improvement.[SB1.1]  Cranial nerves[AM1.1]:[AL1.1] PERRL,[SB1.1] EOM intact[AM1.1], no nystagmus[AL1.1]. Facial movement symmetric. Hearing intact to conversation.[AM1.1]  No dysarthria[AL1.1]. Palate elevation symmetric. Shoulder shrug strong.Tongue midline.[AM1.1] No nystagmus.[SB1.1]  Motor[AM1.1] Survey[SB1.1]:[AM1.1] Normal muscle bulk and tone.[SB1.1] No tremors[AL1.1]. Neck flexion strength 4/5.[AM1.1] Strength preserved on arms and legs, proximally and distally[AL1.1]. Exercise of deltoids did not elicit weakness[SB1.1]  Reflexes[AL1.1]:[AM1.1] Reflexes in the upper and lower extremities are normal and bilaterally symmetric[SB1.1].[AM1.1] Plantar response is flexor bilaterally.[SB1.1]  Coordination: FNF normal.[AM1.1] No dysmetria (arms and legs tested)  Sensory[AL1.1]: L[AM1.1]ight touch[AL1.1] and  vibratory[AM1.1] preserved[AL1.1] upper and lower extremities.  Gait: somewhat wide-based gait[AM1.1], using a walker and stable.[SB1.1]           Data:[AL1.1]     Recent Results (from the past 24 hour(s))   EKG 12-lead, tracing only    Collection Time: 11/30/18  9:44 AM   Result Value Ref Range    Interpretation ECG Click View Image link to view waveform and result    CBC with platelets    Collection Time: 11/30/18 10:22 AM   Result Value Ref Range    WBC 6.4 4.0 - 11.0 10e9/L    RBC Count 3.50 (L) 3.8 - 5.2 10e12/L    Hemoglobin 11.6 (L) 11.7 - 15.7 g/dL    Hematocrit 35.3 35.0 - 47.0 %     (H) 78 - 100 fl    MCH 33.1 (H) 26.5 - 33.0 pg    MCHC 32.9 31.5 - 36.5 g/dL    RDW 13.4 10.0 - 15.0 %    Platelet Count 239 150 - 450 10e9/L   Basic metabolic panel    Collection Time: 11/30/18 10:22 AM   Result Value Ref Range    Sodium 138 133 - 144 mmol/L    Potassium 3.7 3.4 - 5.3 mmol/L    Chloride 105 94 - 109 mmol/L    Carbon Dioxide 25 20 - 32 mmol/L    Anion Gap 7 3 - 14 mmol/L    Glucose 141 (H) 70 - 99 mg/dL    Urea Nitrogen 42 (H) 7 - 30 mg/dL    Creatinine 1.71 (H) 0.52 - 1.04 mg/dL    GFR Estimate 28 (L) >60 mL/min/1.7m2    GFR Estimate If Black 34 (L) >60 mL/min/1.7m2    Calcium 7.5 (L) 8.5 - 10.1 mg/dL[AM1.2]              Assessment and Plan:[AL1.1]   Linda Spicer is a 87-year-old woman with PMH of HFpEF, AFib/AFlutter s/p multiple cardioversions currently anticoagulated on apixaban, 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[AM1.1]    1.[SB1.1] Shortness of breath  Linda has had occasional periods of increased shortness of breath over the years contributed by rheumatoid pulmonary fibrosis and heart failure. She has had recent episodes of worsened shortness of breath with minimal exertion and lightheadedness that have resulted in syncope or near-syncope and falls. On exam, she has fluctuating dyspnea that  "results in 4-word to much longer sentences. Her dyspnea was equivalent if not a little worse when lying flat and continuing conversation[AM1.1] certainly was not improved[SB1.1]. There may be a psychiatric or emotional component to her worsened symptoms due to the recent passing of her  contributing to anxiety and evolution of symptoms[AM1.1] it almost seemed in the exam that with distraction her sentences became longer and her breathing calmed down[SB1.1]. Her symptoms are most likely pulmonary[AM1.1] in nature[SB1.1]. Less likely is an underlying neuromuscular weakness that must be ruled out.    - Recommend obtaining PFTs to evaluate for possible neuromuscular etiology[AM1.1] vs pulmonary pattern[SB1.1]  - Recommend obtaining[AM1.1] serum[SB1.1] Myasthenia Gravis panel[AM1.1] (acetylcholine binding, blocking and modulating antibodies)[SB1.1]  to rule out this NM etiology[AM1.1]  -EMG could be performed as outpatient if neuromuscular pattern of weakness seen on PFTs. Not ideal to obtain in the hospital as quality is low making repetitive nerve stimulation highly unreliable. Also EMG not available over the weekend.    2. Dizziness/Lightheadedness  On review of Care Everywhere she has had numerous work ups for \"dizziness\" over the past two years including 4 Head CTs and an MRI brain. Some episodes have led to falls and trauma but no sign of hemorrhage. No need for more imaging. Yesterday she was witnessed to have a syncopal event. My assumption this is a combination of pulmonary and cardiac factors. Anxiety may play a role. Orthostatic blood pressure should be checked. I understand the cardiology team is trying to avoid fluid overload. Consideration fo compression stockings may help if she is orthostatic.     Recommendation-check orthostatic vitals signs, use compression stockings if present.[SB1.1]           Attestation:[AL1.1]  I,[SB1.1] Elvia Burks[SB1.2], have seen and examined the patient and " confirmed all findings. I agree with the findings in this note and have edited for detail and accuracy.    Overall I have low suspicion for neurological etiology for the patient's shortness of breath. Pulmonary function tests will help clarify type of breathing difficulties. She was witnessed to actually get worse with exertion and to my observation be worse laying flat while talking rather than better as initially reported by primary team. In that setting I can't completely rule out a neuromuscular junction disorder and I am recommending myasthenia gravis antibodies be drawn.    Thank you for the consultation.    Elvia Burks MD [facility]  Department of Neurology  Pager [phone][SB1.1]                   Revision History        User Key Date/Time User Provider Type Action    > SB1.2 12/1/2018  8:34 AM Elvia Burks MD Physician Sign     SB1.1 12/1/2018  8:17 AM Elvia Burks MD Physician      AM1.2 11/30/2018  6:07 PM Dayanara Lazcano Medical Student Share     AM1.1 11/30/2018  4:44 PM Dayanara Lazcano Medical Student      AL1.2 11/30/2018  4:43 PM Juan Manuel Bowser MD Resident Share     AL1.1 11/30/2018  4:41 PM Juan Manuel Bowser MD Resident                      Progress Notes - Physician (Notes from 12/01/18 through 12/04/18)      Progress Notes by Peggy Ramos MD at 12/4/2018 12:40 PM     Author:  Peggy Ramos MD Service:  Cardiology Author Type:  Resident    Filed:  12/4/2018 12:58 PM Date of Service:  12/4/2018 12:40 PM Creation Time:  12/3/2018 12:17 AM    Status:  Attested :  Peggy Ramos MD (Resident)    Cosigner:  Talat Littlejohn MD at 12/4/2018  2:40 PM        Attestation signed by Talat Littlejohn MD at 12/4/2018  2:40 PM        Attestation:  CARDIOLOGY STAFF NOTE  I have seen and examined the patient with the Cards 1 team. Patient was seen and examined with cardiology service. History was  personally confirmed by me. Labs, imaging studies, EKGs, and telemetry was reviewed. I agree with the note above that reflects my assessment and plan of care.    Talat Littlejohn MD  Staff Cardiologist                                     [facility]   Cardiology Progress Note            Assessment and Plan:[CB1.1]   Linda Spicer[CB1.2] is an 86 y/o F with history of HFpEF, paroxysmal Afib/Aflutter (on apixaban) s/p multiple failed cardioversions, tachy lisa s/p PPM (2/17), rheumatoid pulmonary fibrosis, CKD, and hypothyroidism who presented with fatigue and shortness of breath.[CB1.1]     Changes today:  - Medically ready for discharge; awaiting TCU placement[CB1.3]    # Dyspnea on exertion   # Chronic HFpEF  # Rheumatoid disease pulmonary fibrosis  Dyspnea likely multifactorial in the setting of known HFpEF, pulmonary fibrosis, atrial fibrillation and deconditioning. Symptoms improved somewhat s/p diuresis.[CB1.1] Appears euvolemic on exam near .  lbs. Currently in NSR, continuing rate control for Afib (not candidate for ablation given co-morbidities per EP notes).[CB1.3]     - Pulmonology consulted, appreciate recs:   -[CB1.1] Given stable CT chest, it is unlikely that pulmonary fibrosis has progressed or flared, although she  has limited pulmonary reserves[CB1.3]   - Neurology consulted, appreciate recs:   -[CB1.1] MG workup in process. Follow up with neuromuscular neurologist as outpatient (Dr. Bond in [place])[CB1.3]   - Diuresis:[CB1.1]continue[CB1.3] lasix 40mg/20mg  - Ald ant: continue spironalactone 12.5mg daily     # Lightheadedness  Felt to be secondary to vasovagal etiolgoy vs Afib vs orthostatic (although orthostatic vitals negative). Afib discussed below.[CB1.1]  - PPM rate increased to 70bpm on 12/3 without improvement in symptoms[CB1.3]  - Neurology consulted[CB1.1]. Symptoms are likely secondary to a combination of pulmonary and cardiac factors.  MG  workup ongoing.[CB1.3]   - Compression stockings    # Atrial fibrillation   CHADsVASC 4, on apixaban. History of multip[CB1.1]le[CB1.3] cardioversions[CB1.1] 9/14/18,10/11/18, 10/16/18.[CB1.3] Has previously tried amiodarone but discontinued due to failure to significantly suppress AF. Not an ablation candidate. May be contributing to dyspnea discussed above. Currently rate controlled.  Per device check, has had 4 episodes since 11/17 ranging from 1 minute to 6 days.     #[CB1.1] Mild to moderate[CB1.3] MR[CB1.1]  May benefit from afterload reduction and improved BP control; however, this has been complicated by presyncope. Previously started isodil/hyral this admission, now holding given recurrent pre-syncopal episodes.  - Recommend close outpatient follow up for consideration of afterload reduction if pre-syncopal events resolve[CB1.3]     # Macrocytic anemia[CB1.1]   Normal B12, MMA pending. No active bleeding. Recommend ongoing workup as outpatient.[CB1.3]     # Rheumatoid disease[CB1.1]   On azathioprine PTA, continued during admission. Also on abatacept q28 days, continue per rheumatology.[CB1.3]     # Hypothyroidism   - Continue levothyroxine     FEN:[CB1.1] 2g sodium diet[CB1.3]   Prophylaxis: DVT apixaba[CB1.1]n[CB1.3]   Dispo:[CB1.1] Medically ready for di[CB1.3]scharge to TCU pending placement   CODE status: Full     Patient seen and discussed with [CB1.1] Annetta.[CB1.3]     Peggy Ramos MD  Internal Medicine, PGY-2  Cardiology Service   [phone]        Interval History:   Hemodynamically stable overnight.[CB1.1] Reports ongoing dizziness when walking or sitting in chair, improved with lying down. Placed on 2L O2 for comfort, weaned off early this morning. PPM lower rate limit increased to 70bpm in case symptoms are due to chronotropic incompetence.[CB1.3]           Medications:   Reviewed in chart.[CB1.1] No changes today, will continue to hold hydral/isordil given pre-syncopal  "events.[CB1.3]           Physical Exam:[CB1.1]   Vitals:[CB1.3] /72 (BP Location: Left arm, Cuff Size: Adult Regular)  Pulse 69  Temp 97.5  F (36.4  C) (Oral)  Resp 18  Ht 1.613 m (5' 3.5\")  Wt 70.9 kg (156 lb 4.8 oz)  LMP  (LMP Unknown)  SpO2 98%  BMI 27.25 kg/m2[CB1.4]  BMI=[CB1.3] Body mass index is 27.25 kg/(m^2).      Intake/Output Summary (Last 24 hours) at 12/04/18 1247  Last data filed at 12/04/18 1144   Gross per 24 hour   Intake              410 ml   Output             2350 ml   Net            -1940 ml[CB1.4]       General: alert, interactive, NAD  HEENT: sclera anicteric, EOM grossly intact, mucous membranes moist   Cardiovascular: regular rate and rhythm, normal S1 and S2, no murmurs, no gallops, no rubs appreciated   Resp: clear to auscultation bilaterall[CB1.1]y apart from soft bibasilar crackles[CB1.3]  GI: soft, nontender, nondistended. +BS.   Extremities: warm,[CB1.1] no LE[CB1.3] edema, no cyanosis or clubbing  Skin: no jaundice or rash on exposed skin, lines in place without surrounding erythema  Neuro:  alert & oriented x 3, non-focal, moving all extremities in bed[CB1.1], CN II-XII grossly intact although becomes dizzy with lateral eye movement bilaterally[CB1.3]   Psych: appropriate affect           Data:   Labs and images reviewed in chart. Notable for:[CB1.1]    CMP[CB1.3]  Recent Labs  Lab 12/03/18  0547 12/02/18  0658 12/01/18  0759 11/30/18  1022  11/28/18  1558    134 137 138  < > 136   POTASSIUM 4.4 4.2 4.0 3.7  < > 3.8   CHLORIDE 104 103 107 105  < > 104   CO2 22 23 23 25  < > 26   ANIONGAP 8 8 7 7  < > 7   GLC 81 93 91 141*  < > 110*   BUN 30 34* 40* 42*  < > 41*   CR 1.48* 1.41* 1.57* 1.71*  < > 1.69*   GFRESTIMATED 33* 35* 31* 28*  < > 29*   GFRESTBLACK 40* 43* 38* 34*  < > 35*   AFTOUMATA 7.5* 7.3* 7.4* 7.5*  < > 7.8*   MAG  --   --  2.4*  --   --   --    PROTTOTAL  --  6.6*  --   --   --  7.5   ALBUMIN  --  3.1*  --   --   --  3.5   BILITOTAL  --  0.4  --   --   --  " 0.4   ALKPHOS  --  57  --   --   --  65   AST  --  19  --   --   --  21   ALT  --  18  --   --   --  21   < > = values in this interval not displayed.[CB1.5]  CBC[CB1.3]  Recent Labs  Lab 12/03/18  0547 12/02/18  0658 12/01/18  0759 11/30/18  1022   WBC 5.9 6.2 7.4 6.4   RBC 3.22* 3.23* 3.46* 3.50*   HGB 10.7* 10.6* 11.5* 11.6*   HCT 32.6* 32.9* 35.2 35.3   * 102* 102* 101*   MCH 33.2* 32.8 33.2* 33.1*   MCHC 32.8 32.2 32.7 32.9   RDW 13.0 13.2 13.4 13.4    237 239 239[CB1.5]     Echocardiogram:  Normal biventricular size and systolic function. The Ejection Fraction is  estimated at 55-60%.  Mild to moderate mitral insufficiency is present.  Mild to moderate tricuspid insufficiency is present.  Estimated pulmonary artery systolic pressure is 26 mmHg plus right atrial  pressure.  Compared to ECHO on 10/9, there are no significant changes. Mitral  regurgitation less prominent compared to recent YOLANDA (10/16).   ---------------     CT chest:  1. Compared to 2/20/2017, stable to slightly progressed basilar and  peripheral predominant fibrotic changes consistent with UIP pattern.  No CT evidence of acute ILD flare.  2. New pulmonary nodules, largest measuring 5 mm in the right lower  lobe compared to 9/11/2017. Follow-up per Fleischner Society criteria.  Multiple additional stable small pulmonary nodules are noted.     PFTs:  FEV1 0.87 of pred   FVC 1.03 of pred  FEV/FVC ~85%[CB1.1]     Revision History        User Key Date/Time User Provider Type Action    > CB1.5 12/4/2018 12:58 PM Peggy Ramos MD Resident Sign     CB1.4 12/4/2018 12:47 PM Peggy Ramos MD Resident      CB1.3 12/4/2018 12:40 PM Peggy Ramos MD Resident      CB1.2 12/3/2018 12:26 AM Peggy Ramos MD Resident      CB1.1 12/3/2018 12:17 AM Peggy Ramos MD Resident             Progress Notes by Karolina Hart MSW at 12/4/2018  1:53 PM     Author:  Karolina Hart  JAQUELINE ROJAS Service:  Social Work Author Type:      Filed:  12/4/2018  2:01 PM Date of Service:  12/4/2018  1:53 PM Creation Time:  12/4/2018  1:53 PM    Status:  Signed :  Karolina Hart MSW ()         Social Work Services Discharge Note      Patient Name:  Linda Spicer     Anticipated Discharge Date: 12/04/18 @ 1600    Discharge Disposition:   Facility: [facility]  PH: [phone]  F: [phone]  Nurse to Nurse Call: PH:[LH1.1] [phone][LH1.2]    Following MD: Tre Lockett  [address] / [place] [address]     Pre-Admission Screening (PAS) online form has been completed.  The Level of Care (LOC) is:  Determined  Confirmation Code is:[LH1.1]  [id number][LH1.2]  Patient/caregiver informed of referral to [facility] Line for Pre-Admission Screening for skilled nursing facility (SNF) placement and to expect a phone call post discharge from SNF.     Additional Services/Equipment Arranged:    - Transportation: Family - Daughter Xi (PH: [phone]) to transport pt at 1600.     Patient / Family response to discharge plan:  Pt and family in agreement with the plan.     Persons notified of above discharge plan:  Pt, Family, bedside RN, Cards 1 team, PCP, SNF admissions.    Staff Discharge Instructions:  Please fax discharge orders and signed hard scripts for any controlled substances.  Please print a packet and send with patient.     CTS Handoff completed:  YES    Medicare Notice of Rights provided to the patient/family:  YES    DENVER Bass APSW  6C Unit   Phone: [phone]  Pager: [phone]  Unit: [phone][LH1.1]             Revision History        User Key Date/Time User Provider Type Action    > LH1.2 12/4/2018  2:01 PM Karolina Hart MSW  Sign     LH1.1 12/4/2018  1:53 PM Karolina Hart MSW              Progress Notes by Karolina Hart MSW at 12/4/2018   1:49 PM     Author:  Karolina Hart MSW Service:  Social Work Author Type:      Filed:  12/4/2018  1:53 PM Date of Service:  12/4/2018  1:49 PM Creation Time:  12/4/2018  1:49 PM    Status:  Signed :  Karolina Hart MSW ()         Social Work Services Progress Note    Hospital Day: 6  Date of Initial Social Work Evaluation:  Not yet completed  Collaborated with:  Pt, daughter Xi    Data:  Pt is a 87 year old female being followed by SW for discharge planning to TCU.    Intervention:  SW called Xi to update on discharge plans per pt request.  [facility] as the family was concerned that [facility] do not go out to [facility].  SW also informed Xi that  at this time does not staff [facility] as well.  Xi asked for a referral anyways as the family knows people who have been in the rehab center and had a positive outcome.    [facility] at this time does not have bed availability.  Xi in agreement with discharging to [facility].  Xi will transport pt at 4 pm today.    Referrals in Process:  [facility]  PH: [phone]  F: [phone]    Assessment:  Pt and family are in agreement with TCU recommendation at discharge.    Plan:    Anticipated Disposition:  Facility:  TCU - [facility]    Barriers to d/c plan: None    Follow Up:  SW to follow for discharge.    DENVER Bass APSW  6C Unit   Phone: [phone]  Pager: [phone]  Unit: [phone]    ___________________________________________________________________________________________________________________________________________________    Referrals Discontinued:  [facility] - family declined  PH: [phone]  F: [phone]    [facility] - no bed availability  [phone]    [facility] - no bed availability  PH: [phone]  F: [phone]    [facility] - family choosing [facility]  at discharge  PH: [phone]  F: [phone]    Community Case Management/Community Services in place:   None[LH1.1]           Revision History        User Key Date/Time User Provider Type Action    > LH1.1 12/4/2018  1:53 PM Karolina Hart MSW  Sign            Progress Notes by Ivelisse Damon RN at 12/3/2018  6:00 AM     Author:  Ivelisse Damon RN Service:  (none) Author Type:  Registered Nurse    Filed:  12/3/2018  3:54 PM Encounter Date:  12/3/2018 Status:  Signed    :  Ivelisse Damon RN (Registered Nurse)           Preliminary Device Interrogation Results.  Final physician signed paceart report to be scanned and attached.    Patient seen at [facility] Unit 6C for evaluation and iterative programming of Medtronic Advisa dual lead pacemaker per MD orders. Per Physician orders LR changed from 60 bpm to 70 bpm. Normal pacemaker function. Since last  session Nov 29th, no new episodes/arrhythmias recorded. Presenting rhythm Ap VS @ 60 bpm.  Intrinsic rhythm = SB @ 54 bpm.  AP = 45.7%.   = 40.2%.  Estimated battery longevity to ZENAIDA = 7 years. When doing atrial capture thresholds at 90 bpm (AAI) the AV conduction moved from 1;1 to 2:1 while patient was sitting up in bed.  No symptoms, and communicated this result to patient nurse and ordering physician.    dual lead pacemaker[RB1.1]           Revision History        User Key Date/Time User Provider Type Action    > RB1.1 12/3/2018  3:54 PM Ivelisse Damon RN Registered Nurse Sign            Progress Notes by Clau Alcala MSW at 12/3/2018 11:07 AM     Author:  Clau Alcala MSW Service:  Social Work Author Type:      Filed:  12/3/2018  3:08 PM Date of Service:  12/3/2018 11:07 AM Creation Time:  12/3/2018 11:07 AM    Status:  Signed :  Clau Alcala MSW ()         Social Work Services Progress Note    Hospital Day: 5  Date of Initial Social Work  "Evaluation:[SS1.1]  Not yet completed[SS1.2]  Collaborated with:[SS1.1]  Patient,[SS1.2] Pt's son (Arjun), Pt's dtr (Xi), [facility] ([facility]), [facility] (Eddy), [facility] 1 Team, Chart Review    Data:[SS1.1]  SW involved for TCU placement at discharge. Per Cards 1 Team, Pt is not stable for discharge today but potentially in 1[SS1.2]-2 days.[SS1.3]     Intervention:[SS1.1]  SW spoke with Pt's son (Arjun) via the phone, and met with Pt and Pt's dtr (Xi) at bedside today. Xi is now the main point of contact for family members at this time. SW discussed discharge planning and TCU placement. Pt and family continue to be in agreement with this recommendation. SW reviewed TCU referrals that have been initiated and clarified Pt/family preference. [facility]'s [facility] would [facility].     Referrals in Process:  1)[SS1.2] [facility][SS1.4] ([id number])[SS1.2]:[SS1.4] Clinically accepted pending bed availability.   2)[SS1.2] [facility][SS1.4] (Admissions: [phone], Fax: [phone])[SS1.2]:[SS1.4] Accepted for admission, however, requesting clarification on:   -When next Orencia injection is scheduled, and can this be put on hold while at TCU?   -Clarification on orders for Hydrocoritisone cream on home medication list.   -Clarification on Estriol on home medication list   -If her \"eye injection drug\" can be put on hold while at TCU?  3) [facility] (Main: [phone], FaxL [phone]): No update from facility today.[SS1.2]    Assessment:[SS1.1]  Pt and family are in agreement with TCU recommendation at discharge.[SS1.2]    Plan:    Anticipated Disposition:[SS1.1]  Facility:  TCU (TBD)[SS1.2]    Barriers to d/c plan:[SS1.1]  Medical stability[SS1.2]    Follow Up:[SS1.1]  SW to continue to follow and assist with discharge plan.    JAQUELINE Quevedo, [facility]  Acute Care Unit  - Covering for Unit 6C  Phone: [phone]  Pager: [phone]  ________________________________________________________________________________________________________________________________    Referrals Discontinued:  -[facility]: Family discontinued this referral[SS1.2]       Revision History        User Key Date/Time User Provider Type Action    > SS1.2 12/3/2018  3:08 PM Clau Alcala MSW  Sign     SS1.3 12/3/2018  1:46 PM Clau Alcala MSW       SS1.4 12/3/2018 12:11 PM Clau Alcala MSW       SS1.1 12/3/2018 11:36 AM Clau Alcala MSW              Progress Notes by Aubree Bai NP at 12/3/2018  1:34 PM     Author:  Aubree Bai NP Service:  Cardiology Author Type:  Nurse Practitioner    Filed:  12/3/2018  1:49 PM Date of Service:  12/3/2018  1:34 PM Creation Time:  12/3/2018  1:34 PM    Status:  Attested :  Aubree Bai NP (Nurse Practitioner)    Cosigner:  Talat Littlejohn MD at 12/3/2018  2:23 PM        Attestation signed by Talat Littlejohn MD at 12/3/2018  2:23 PM        Attestation:  Physician Attestation   I, Talat Littlejohn, saw and evaluated Linda Spicer as part of a shared visit.  I have reviewed and discussed with the advanced practice provider their history, physical and plan.    I personally reviewed the vital signs, medications, labs and imaging.    My key history or physical exam findings: vitals stable, telemetry A-paced rhythm at 59 bpm    Key management decisions made by me:   -attempt to increase lower rate limit on pacing in case symptoms due to chronotropic incompetence  -clinically euvolemic    Talat Littlejohn  Date of Service (when I saw the patient): 18                                                             Progress Note  Linda Spicer MRN: [id number]  Age: 87 year old, : 1931  Date: 2018            Interval History:     Had vasovagal episode after getting up from the  "toilet - was dizz[BF1.1]y[BF1.2], lightheaded and diaphoretic. No LOC, tele and VS unremarkable.     PHYSICAL EXAM    Vital signs:  Temp: 98  F (36.7  C) Temp src: Oral BP: 155/62   Heart Rate: 59 Resp: 20 SpO2: 99 % O2 Device: None (Room air) Oxygen Delivery: 2 LPM Height: 161.3 cm (5' 3.5\") Weight: 71.7 kg (158 lb)  Estimated body mass index is 27.55 kg/(m^2) as calculated from the following:    Height as of this encounter: 1.613 m (5' 3.5\").    Weight as of this encounter: 71.7 kg (158 lb).    Intake/Output Summary (Last 24 hours) at 12/01/18 0716  Last data filed at 12/01/18 0500   Gross per 24 hour   Intake              920 ml   Output              800 ml   Net              120 ml     GEN: NAD, resting comfortably on exam, pleasant and cooperative , AAox3  HEENT: NC/AT , well injected conjunctiva, anicteric sclera, EOMI, PERRL  Neck: trachea midline, JVD difficult to assess  CV: RRR, nl S1/S2 no mumurs  PULM: basilar inspiratory crackles   Abdomen: soft, non tender/distented , BS +   EXTREMITIES: warm, no pedal edema  SKIN: No rashes  NEURO: MS: AAOx3 - no focal deficit     DIAGNOSTIC STUDIES  ROUTINE ICU LABS (Last four results)  CMP[BF1.1]    Recent Labs  Lab 12/03/18  0547 12/02/18  0658 12/01/18  0759 11/30/18  1022  11/28/18  1558    134 137 138  < > 136   POTASSIUM 4.4 4.2 4.0 3.7  < > 3.8   CHLORIDE 104 103 107 105  < > 104   CO2 22 23 23 25  < > 26   ANIONGAP 8 8 7 7  < > 7   GLC 81 93 91 141*  < > 110*   BUN 30 34* 40* 42*  < > 41*   CR 1.48* 1.41* 1.57* 1.71*  < > 1.69*   GFRESTIMATED 33* 35* 31* 28*  < > 29*   GFRESTBLACK 40* 43* 38* 34*  < > 35*   FATOUMATA 7.5* 7.3* 7.4* 7.5*  < > 7.8*   MAG  --   --  2.4*  --   --   --    PROTTOTAL  --  6.6*  --   --   --  7.5   ALBUMIN  --  3.1*  --   --   --  3.5   BILITOTAL  --  0.4  --   --   --  0.4   ALKPHOS  --  57  --   --   --  65   AST  --  19  --   --   --  21   ALT  --  18  --   --   --  21   < > = values in this interval not " displayed.[BF1.3]  CBC    Recent Labs  Lab 12/03/18  0547 12/02/18  0658 12/01/18  0759 11/30/18  1022   WBC 5.9 6.2 7.4 6.4   RBC 3.22* 3.23* 3.46* 3.50*   HGB 10.7* 10.6* 11.5* 11.6*   HCT 32.6* 32.9* 35.2 35.3   * 102* 102* 101*   MCH 33.2* 32.8 33.2* 33.1*   MCHC 32.8 32.2 32.7 32.9   RDW 13.0 13.2 13.4 13.4    237 239 239     Echocardiogram:  Normal biventricular size and systolic function. The Ejection Fraction is  estimated at 55-60%.  Mild to moderate mitral insufficiency is present.  Mild to moderate tricuspid insufficiency is present.  Estimated pulmonary artery systolic pressure is 26 mmHg plus right atrial  pressure.  Compared to ECHO on 10/9, there are no significant changes. Mitral  regurgitation less prominent compared to recent YOLANDA (10/16).   ---------------    CT chest:  1. Compared to 2/20/2017, stable to slightly progressed basilar and  peripheral predominant fibrotic changes consistent with UIP pattern.  No CT evidence of acute ILD flare.  2. New pulmonary nodules, largest measuring 5 mm in the right lower  lobe compared to 9/11/2017. Follow-up per Fleischner Society criteria.  Multiple additional stable small pulmonary nodules are noted.    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     #Plan for today:    - holding discharge due to presyncope event today  - increase PPM rate to 70 and see if symptoms improve    #Dyspnea on exertion   #HFpEF  #Rheumatoid pulmonary fibrosis   This is likely multifactorial given HFpEF , pulmonary fibrosis , and deconditioning. Patient received IV diuresis[BF1.1] with improvement in SOB but not complete resolution.[BF1.4] Pulmonary medicine  evaluated and there is no evidence of pulmonary disease flare or infectious process. CT chest didn't show significant progression of PF. A-fib could also be contributing but less likely given that SOB is present even when she is in a-paced rhythm. She has failed multiple cardioversion in the past, was tried on amiodarone but was discontinued given it failed to suppress AF significantly, and is not a candidate for ablation. Continue rate control for the time being. Her mitral insufficiency is unlikely to be causing her symptoms given that its only mild to moderate MI.     - C[BF1.1]urrently appears euvolemic on exam near EDW of 154 lbs  - C[BF1.4]ontinue lasix 40/20 mg daily and malathi 12.5 mg daily   - C[BF1.1]urrently in NSR, continue[BF1.4] rate control of a-fib, not a candidate for Ablation given co morbidities per EP notes  - Pulmonology consulted given history of rheumatic pulmonary fibrosis, CT chest with no major changes   - Neurology consulted to assess for possible neurological sources of her symptoms, work up for MG     #Lighheadedness   Presented with lightheadedness/presyncope. Orthostatics negative.  Neuro evaluated for other causes and per assessment likely vasovagal. Has an episode of presyncope this a.m. While getting up from the toilet. Sounded vasovagal. Will attempt to increase pacer rate to 70 and see if symptoms improve.   - Increase pacer rate to 70 BPM  - Compression stocking   - Per Neurology MG work up   - A.fib as above     # AF  - on apixaban for anticoagulation  - with very frequent cardioversions 9/14/18,10/11/18, 10/16/18  - rate controlled   - CHADSVASC4 on eliquis   - per device check has had 4 episodes since 11/17 1min->6 days       #Mild-mod MR  - could benefit from afterload reduction and better bp control; however complicated by presyncope  - Had previously start isordil/hydral this admission, no holding given presyncopal episode today     #Macrocytic Anemia   Normal B12, MMA  "pending. Further workup deferred to outpatient setting.     chronic  #Rheumatoid disease with pulmonary involvement on azathioprine   #Hypothyroidism- continue thyroxine        Prophylaxis:  DVT: Apixiban GI: None   Family: Not updated  Disposition: Plan for TCU, pending placement[BF1.1] 1-2 days[BF1.4]  Code Status: Full     Aubree Bai, APRN, CNP  [facility] Cardiology Team   [phone][BF1.1]       Revision History        User Key Date/Time User Provider Type Action    > [N/A] 12/3/2018  1:49 PM Aubree Bai, NP Nurse Practitioner Sign     BF1.2 12/3/2018  1:48 PM Aubree Bai, NP Nurse Practitioner Sign     BF1.4 12/3/2018  1:46 PM Aubree Bai, NP Nurse Practitioner      BF1.3 12/3/2018  1:35 PM Aubree Bai, NP Nurse Practitioner      BF1.1 12/3/2018  1:34 PM Aubree Bai, NP Nurse Practitioner             Progress Notes by Yony Subramanian RN at 12/3/2018 11:05 AM     Author:  Yony Subramanian RN Service:  Cardiology Author Type:  Registered Nurse    Filed:  12/3/2018  2:19 PM Date of Service:  12/3/2018 11:05 AM Creation Time:  12/3/2018  2:15 PM    Status:  Signed :  Yony Subramanian RN (Registered Nurse)         Feeling SOB, Fatigued, \"I just dont feel right\"[EO1.1]    /62 (BP Location: Right arm)  Pulse 65  Temp 98  F (36.7  C) (Oral)  Resp 20  Ht 1.613 m (5' 3.5\")  Wt 71.7 kg (158 lb)  LMP  (LMP Unknown)  SpO2 99%  BMI 27.55 kg/m2[EO1.2]    Pt c/o of having symptoms that brought her in to hospital. She C/o of SOB, but sats were 98% RA, feeling tired and not able to catch her breath. MD was notified and was at bedside right away. New orders received.    1400:  Pr reports to be feeling better, pt reports O2 seems to be helping. Daughter at bedside and POC reviewed.[EO1.1]     Revision History        User Key Date/Time User Provider Type Action    > EO1.2 12/3/2018  2:19 PM Yony Subramanian RN Registered Nurse Sign     EO1.1 12/3/2018  2:15 PM " Yony Subramanian RN Registered Nurse             Progress Notes by Chapincito Jones MD at 12/2/2018  1:24 PM     Author:  Chapincito Jones MD Service:  Neurology Author Type:  Resident    Filed:  12/2/2018  1:28 PM Date of Service:  12/2/2018  1:24 PM Creation Time:  12/2/2018  1:24 PM    Status:  Attested :  Chapincito Jones MD (Resident)    Cosigner:  Elvia Burks MD at 12/2/2018  2:00 PM        Attestation signed by Elvia Burks MD at 12/2/2018  2:00 PM        Attestation:  Physician Attestation   I did not see the patient on this date. Agree with Dr Jones.    Elvia Burks MD                               BRIEF NEUROLOGY NOTE:    Chart reviewed, patient discussed on rounds, not examined. Myasthenia panel drawn and pending, will continue to follow peripherally for this result. Would recommend continued follow up with pulmonology for treatment of underlying pulmonary causes of SOB prior to extensive workup of neurologic causes. Would recommend she follow up with neuromuscular neurologist as an outpatient (can seen Dr. Bond in [place] seeing as this is where she gets some of her care).    Neurology will follow peripherally for lab results. Do not hesitate to contact us with any questions or concerns.    Patient care discussed with staff neurologist, Dr. Burks.    Bhanu Jones MD  Neurology PGY-3[NM1.1]     Revision History        User Key Date/Time User Provider Type Action    > NM1.1 12/2/2018  1:28 PM Chapincito Jones MD Resident Sign            Progress Notes by Romina Garcia MD at 12/2/2018  7:25 AM     Author:  Romina Garcia MD Service:  Pulmonology Author Type:  Fellow    Filed:  12/2/2018  1:03 PM Date of Service:  12/2/2018  7:25 AM Creation Time:  12/2/2018  7:25 AM    Status:  Attested :  Romina Garcia MD (Fellow)    Cosigner:  Michoacano Ambrosio MD at 12/2/2018  1:25 PM         Attestation signed by Michoacano Ambrosio MD at 12/2/2018  1:25 PM        Attestation:  Attending statement:    The patient was seen and examined by me with the pulmonary fellow, Dr Garcia.  The case was discussed at length.  Vitals, lab results and imaging from our visit were reviewed.  The note written by Dr Garcia above reflects our joint assessment and plan.    Michoacano Ambrosio MD                                   [facility]    Pulmonary, Allergy, Critical Care and Sleep Medicine    Follow-up Note  12/02/2018    Assessment and Recommendations:    87-year-old female with long-standing rheumatoid arthritis associated ILD, A. fib on anticoagulation, diastolic CHF, CKD and hypothyroidism is admitted to the hospital with acute respiratory distress secondary to uncontrolled A. fib and CHF exacerbation.  Cardiology consulted pulmonary to evaluate contribution of her pulmonary fibrosis to her current dyspnea. Her CRP and ESR are WNL. BNP 2400.  It does not look like that patient has an ILD flare or an infection.  Given her stable PFTs it is unlikely that her ILD progressed.[MS1.1] Ct scan on this admission 12/1 is not much changed from her previous 1 yr back.[MS1.2]  Drug toxicity is another possibility however[MS1.1] less likely.[MS1.2]      1. Acute Resp Failure2/2 CHF exacerbation and Afib  2. 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 RA: On abatacept and azathioprine     Recommendations:[MS1.1]     Given her stable CT chest findings, WNL, ESR, CRP we doubt that her pulmonary fibrosis had progressed or flared up causing her to have dyspnea. She has limited pulmonary reserves and this acute process has resulted in worsening dyspnea.[MS1.2]     Continue diuresis as tolerated and a fib rate control.[MS1.1]      Would recommend goals of care discussion and offer hospice if appropriate. Would defer the decision to primary team.[MS1.2]     Follow up with Dr. Perlman of  pulmonary at [facility] clinic after discharge.      Seen and discussed with Staff Dr ALBERT[MS1.1]shira[MS1.2] MD. Please see staff addendum for any changes/edits.      Romina Garcia  Pulmonary and Critical Care Fellow      Subjective, Interval history:[MS1.1]   PT on room air sat > 95%. Has dyspnea on talking and ambulation slightly better than yesterday.   PT reports recent loss of her  to cancer in Sep 2018 and being alone at her senior home. Was on her knees for 15 min.[MS1.2]    PFT bedside 12/1[MS1.1], although her FVC is decreased from her baseline, this could be secondary to her current illness and should be repeated as out pt after resolution of her current illness.[MS1.2]         Test                                       1                                          2                                          3  FEV1 (L)                    0.87                                     1.02                                     0.94  FVC (L)                     1.03                                     1.20                                     1.14  FEV1/FVC                 85%                                     84%                                     82%  PEF (l/s)                    2.46                                     2.54                                     2.60                      Objective:   Medications:    apixaban ANTICOAGULANT  2.5 mg Oral BID     azaTHIOprine  50 mg Oral Daily     carvedilol  3.125 mg Oral BID w/meals     folic acid  1 mg Oral Daily     hydrALAZINE  10 mg Oral TID     isosorbide dinitrate  10 mg Oral TID AC     levothyroxine  75 mcg Oral Daily     ranitidine  150 mg Oral BID     spironolactone  12.5 mg Oral Daily     trimethoprim  50 mg Oral Daily     albuterol, - MEDICATION INSTRUCTIONS -, senna-docusate    Physical Exam:  Temp:  [97.8  F (36.6  C)-98.3  F (36.8  C)] 97.8  F (36.6  C)  Heart Rate:  [59-64] 60  Resp:  [18] 18  BP: (108-148)/(60-92) 140/60  SpO2:  [96  %-99 %] 96 %    Intake/Output Summary (Last 24 hours) at 12/02/18 0723  Last data filed at 12/02/18 0645   Gross per 24 hour   Intake              240 ml   Output              750 ml   Net             -510 ml[MS1.1]     General: laying in bed in NAD on room air but would get dyspnea on talking.   HEENT: anicteric, moist mucosa  Neck: no palpable lymphadenopathy, JVP ~ mid neck   Chest: tachypnea. NVB with basal crackles dry no wheeze.   Cardiac: RRR no murmurs  Abdomen: Soft, flat, non tender, active BS  Extremities: No LE Edema  Neuro: A&Ox3, no focal deficits   Skin: no rash noted[MS1.2]    Labs and imaging: All laboratory and imaging data personally reviewed.    CT chest 12/1  1. Compared to 2/20/2017, stable to slightly progressed basilar and  peripheral predominant fibrotic changes consistent with UIP pattern.  No CT evidence of acute ILD flare.  2. New pulmonary nodules, largest measuring 5 mm in the right lower  lobe compared to 9/11/2017. Follow-up per Fleischner Society criteria.  Multiple additional stable small pulmonary nodules are noted.         Recent Labs  Lab 12/02/18  0658 12/01/18  0759 11/30/18  1022 11/29/18  1646  11/28/18  1558   WBC 6.2 7.4 6.4  --   < > 6.2   HGB 10.6* 11.5* 11.6*  --   < > 12.4   * 102* 101*  --   < > 102*    239 239  --   < > 270   NA  --  137 138 137  < > 136   POTASSIUM  --  4.0 3.7 4.0  < > 3.8   CHLORIDE  --  107 105 104  < > 104   CO2  --  23 25 26  < > 26   BUN  --  40* 42* 47*  < > 41*   CR  --  1.57* 1.71* 1.79*  < > 1.69*   ANIONGAP  --  7 7 7  < > 7   FATOUMATA  --  7.4* 7.5* 7.5*  < > 7.8*   GLC  --  91 141* 106*  < > 110*   ALBUMIN  --   --   --   --   --  3.5   PROTTOTAL  --   --   --   --   --  7.5   BILITOTAL  --   --   --   --   --  0.4   ALKPHOS  --   --   --   --   --  65   ALT  --   --   --   --   --  21   AST  --   --   --   --   --  21   TROPI  --   --   --   --   --  <0.015   < > = values in this interval not displayed.  Recent Results (from  the past 24 hour(s))   CT Chest w/o Contrast    Narrative    EXAMINATION: Chest CT  without contrast     CLINICAL HISTORY: History of pulmonary fibrosis evaluate for ILD  flare..    COMPARISON: CT 9/11/2017, 2/20/2017 and 9/18/2012.    TECHNIQUE: CT imaging obtained through the chest without contrast.  Coronal and axial MIP reformatted images obtained.     FINDINGS:  Question trace secretions in the proximal trachea, otherwise the  central airways are clear. Lungs are hyperexpanded. Redemonstration of  peripheral and basilar predominant reticulation, architectural  distortion, and traction bronchiectasis. Basilar predominant  honeycombing is again noted and stable to slightly progressed from  either exam. No groundglass component. No focal consolidation, pleural  effusion, or pneumothorax. 5 mm solid pulmonary nodule right lower  lobe (series 6 image 131), new from 9/11/2017. Multiple additional  small pulmonary nodules are noted, some of which are new and some  which are stable from prior exam.    Left chest wall cardiac device with leads in the right atrium and  right ventricle. Cardiomegaly. No significant pericardial effusion.  Ascending aorta and main pulmonary trunk are normal caliber. Mild  coronary artery calcifications. Scattered calcifications of the aortic  arch and proximal great vessels. 1.3 cm subcarinal lymph node is  unchanged. Tiny sliding-type hernia. Patulous esophagus with debris  extending to the proximal esophagus.    Bones and soft tissues: No acute fracture. Multilevel degenerative  changes of the spine. Soft tissues are unremarkable..     Partially imaged upper abdomen: Partial visualized cystic structure  arising from the body of the pancreas, (series 2 image 57), better  characterized on recent CT abdomen 11/17/2018. Circular rim  calcification in the left upper quadrant measuring 1.3 cm, small  splenic artery aneurysm.       Impression    IMPRESSION:   1. Compared to 2/20/2017, stable to  slightly progressed basilar and  peripheral predominant fibrotic changes consistent with UIP pattern.  No CT evidence of acute ILD flare.  2. New pulmonary nodules, largest measuring 5 mm in the right lower  lobe compared to 9/11/2017. Follow-up per Fleischner Society criteria.  Multiple additional stable small pulmonary nodules are noted.    I have personally reviewed the examination and initial interpretation  and I agree with the findings.    MD Romina CENTENO MD  Pulmonary Critical Care Fellow[MS1.1]          Revision History        User Key Date/Time User Provider Type Action    > MS1.2 12/2/2018  1:03 PM Romina Garcia MD Fellow Sign     MS1.1 12/2/2018  7:25 AM Romina Garcia MD Fellow             Progress Notes by Fredrick Will PA-C at 11/30/2018 11:47 AM     Author:  Fredrick Will PA-C Service:  Cardiology Author Type:  Physician Assistant    Filed:  11/30/2018  2:18 PM Date of Service:  11/30/2018 11:47 AM Creation Time:  11/30/2018 11:47 AM    Status:  Attested :  Fredrick Will PA-C (Physician Assistant)    Cosigner:  Quinn Nieves MD at 12/1/2018  2:57 PM        Attestation signed by Quinn Nieves MD at 12/1/2018  2:57 PM        Attestation:  Physician Attestation   IQuinn, saw and evaluated Linda Spicer as part of a shared visit.  I have reviewed and discussed with the advanced practice provider their history, physical and plan.    I personally reviewed the vital signs, medications, labs and imaging.    My key history or physical exam findings: Patient being seen for SOB.  Possible contributors include paroxysmal atrial fibrillation, moderate mitral regurgitation, diastolic heart failure and lung disease.     Key management decisions made by me: Plan to check PFTs and consult pulmonary.    Quinn Nieves  Date of Service (when I saw the patient): 11/30/18                                    [facility]   Cardiology Consults  Progress Note     Interval History:  - No significant improvement with diuresis. Creatinine bumped to 1.79. Possibly dry  - Device interrogation normal function, echo demonstrating mild-moderate mitral insuff    Physical Exam:  Temp:  [98  F (36.7  C)-98.3  F (36.8  C)] 98.2  F (36.8  C)  Heart Rate:  [59-82] 69  Resp:  [18-20] 18  BP: (109-135)/(61-85) 135/85  SpO2:  [95 %-97 %] 96 %    I/O:    Wt:   Wt Readings from Last 5 Encounters:   11/30/18 70.5 kg (155 lb 8 oz)   11/27/18 73 kg (161 lb)   11/26/18 72.8 kg (160 lb 6.4 oz)   11/18/18 71.1 kg (156 lb 12.8 oz)   11/01/18 73.1 kg (161 lb 1 oz)       GEN: Awake, alert, appears less short of breath today  Pulm: Bi-basillar crackles, no wheeze, no cough, no rhonchi  Cardiac: JVP not visible on exam, no audible murmurs, paced rhythm  Vascular: No lower extremity edema and palpable radial and pedal pulses  GI: soft, non distended  Neuro: CN II-XII grossly intact    Medications:    apixaban ANTICOAGULANT  2.5 mg Oral BID     azaTHIOprine  50 mg Oral Daily     carvedilol  3.125 mg Oral BID w/meals     folic acid  1 mg Oral Daily     hydrALAZINE  10 mg Oral TID     isosorbide dinitrate  10 mg Oral TID AC     levothyroxine  75 mcg Oral Daily     ranitidine  150 mg Oral BID     spironolactone  12.5 mg Oral Daily     trimethoprim  50 mg Oral Daily       - MEDICATION INSTRUCTIONS -         Labs:   CMP    Recent Labs  Lab 11/30/18  1022 11/29/18  1646 11/29/18  0651 11/28/18  1558    137 141 136   POTASSIUM 3.7 4.0 3.6 3.8   CHLORIDE 105 104 106 104   CO2 25 26 28 26   ANIONGAP 7 7 7 7   * 106* 93 110*   BUN 42* 47* 38* 41*   CR 1.71* 1.79* 1.57* 1.69*   GFRESTIMATED 28* 27* 31* 29*   GFRESTBLACK 34* 32* 38* 35*   FATOUMATA 7.5* 7.5* 7.7* 7.8*   PROTTOTAL  --   --   --  7.5   ALBUMIN  --   --   --  3.5   BILITOTAL  --   --   --  0.4   ALKPHOS  --   --   --  65   AST  --   --   --  21   ALT  --   --    --  21     CBC    Recent Labs  Lab 11/30/18  1022 11/29/18  0651 11/28/18  1558   WBC 6.4 6.7 6.2   RBC 3.50* 3.71* 3.74*   HGB 11.6* 12.2 12.4   HCT 35.3 37.8 38.3   * 102* 102*   MCH 33.1* 32.9 33.2*   MCHC 32.9 32.3 32.4   RDW 13.4 13.6 13.6    249 270     INRNo lab results found in last 7 days.  Arterial Blood GasNo lab results found in last 7 days.    Diagnostics:  ECG 11/28/2018:      CXR 11/28/2018:  Findings:   Frontal convexity of the chest. Left chest wall cardiac device with  intracardiac leads. No large pleural effusion or pneumothorax.  Slightly increased bibasilar opacities compared to chest x-ray dated  11/17/2018         Impression: Mildly increased bibasilar opacities which may represent  atelectasis versus infection versus progression of interstitial lung  disease.      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 She states she has been feeling poorly about a week ago she feels she is about to faint is short of breath at times at rest and with any minimal exertion she gets lightheaded and at times feels she is about to pass out. She states her flutter feels somewhat different since she has the pacer she cant tell when she is in an abnormal rhythm but she gets fatigued quicker, she was cardioverted on 10/16/18. She went to core clinic yesterday she has been having high blood pressures since 11/25 as high as 174/90 with pulse remaining in the 60's. Her weight has not change[DJ1.1]d[DJ1.2] much[DJ1.1] -[DJ1.2] may be up one pound.[DJ1.1] Today she appears less short of breath, but subjectively remains symptomatic[DJ1.2] and has bibasilar crackles. No audible murmur on exam. Repeat echo with mild-moderate mitral insufficiency. Device  interrogation showing normal function, 80% AF burden. Creatinine bumped, will hold further diuresis.     #Shortness of breath  #HFpEF  - ADHF -> she presents with increased BNP and shortness of breath worsening for the past week  - she has been in flutter frequently and presents in flutter she does go in and out of it per last pacer check, rates are controlled though she may be very symptomatic depending on her atrial kick. She has been seen by EP and was tried on amiodarone which was discontinue given she keeps going in and out of AF. Unlikely to be related to mitral insufficiency given current reading of mild to moderate MI. Continues to have oxygen saturation of high 90s.  - not a candidate for Ablation given co morbidities per EP notes  - consider repeat DCCV  - on spironolactone 12.5 mg daily   - Hold lasix  - Retry PFTs today  - Pulmonology consulted given history of rheumatic pulmonary fibrosis, appreciate recs  - Neurology consulted to assess for possible neurological sources of her symptoms    # AF  - on apixaban for anticoagulation  - with very frequent cardioversions 9/14/18,10/11/18, 10/16/18  - rate controlled   - CHADSVASC4 on eliquis   - per device check has had 4 episodes since 11/17 1min->6 days   - given use of amiodarone and parenchymal involvement of rheumatic disease try to get PFT's to ensure etiology of shortness of breath is not worsening lung function     #Severe MR  - could benefit from afterload reduction and better bp control - hydral/isordil given NEIL    - may consider switching to lisinopril for afterload reduction.    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 Nieves, who agrees with above plan.      Fredrick Will PA-C  [facility] Cardiology Team[DJ1.1]           Revision History        User Key  Date/Time User Provider Type Action    > DJ1.2 11/30/2018  2:18 PM Fredrick Will PA-C Physician Assistant Sign     DJ1.1 11/30/2018 11:54 AM Fredrick Will PA-C Physician Assistant Sign            Progress Notes by Francisco Polo RT at 12/1/2018  2:21 PM     Author:  Francisco Polo RT Service:  (none) Author Type:  Respiratory Therapist    Filed:  12/1/2018  2:23 PM Date of Service:  12/1/2018  2:21 PM Creation Time:  12/1/2018  2:21 PM    Status:  Signed :  Francisco Polo RT (Respiratory Therapist)         Bedside PFT performed x 3 attempts.  Results are        Test    1    2    3  FEV1 (L) 0.87  1.02  0.94  FVC (L) 1.03  1.20  1.14  FEV1/FVC 85%  84%  82%  PEF (l/s) 2.46  2.54  2.60[SM1.1]     Revision History        User Key Date/Time User Provider Type Action    > SM1.1 12/1/2018  2:23 PM Francisco Polo, RT Respiratory Therapist Sign            Progress Notes by Elvia Burks MD at 12/1/2018 12:00 PM     Author:  Elvia Burks MD Service:  Neurology Author Type:  Physician    Filed:  12/1/2018 12:02 PM Date of Service:  12/1/2018 12:00 PM Creation Time:  12/1/2018 12:00 PM    Status:  Signed :  Elvia Burks MD (Physician)         Chart reviewed. Awating PFTS, pulmonary consult awaiting staffing. Myasthenic antibodies not drawn yet. Will continue to follow.    Elvia Burks MD [facility]  Department of Neurology  Pager [phone][SB1.1]           Revision History        User Key Date/Time User Provider Type Action    > SB1.1 12/1/2018 12:02 PM Elvia Burks MD Physician Sign            Progress Notes by Destiney Lambert BSW at 12/1/2018  8:42 AM     Author:  Destiney Lambert BSW Service:  Social Work Author Type:      Filed:  12/1/2018 10:16 AM Date of Service:  12/1/2018  8:42 AM Creation Time:  12/1/2018  8:42 AM    Status:  Addendum :  Destiney Lambert BSW ()         Social Work Services Progress  Note    Hospital Day: 4  Date of Initial Social Work Evaluation:[EG1.1]  Not completed[EG1.2]  Collaborated with:[EG1.1]  [facility] , [facility][EG1.2]r, [facility][EG1.3]    Data:[EG1.1]  SW involved for TCU placement[EG1.2]    Intervention:[EG1.1]  Discharge planning[EG1.2]    Assessment:[EG1.1]    * Update* SW spoke to Ella who explained could accept pt when medically ready as she has a bed available at this time. SW confirmed the information.[EG1.3]    - Referrals in Process:    Addendum: Son called to start referrals to the following facilities:   [facility]  PH: [phone]  F: [phone]     [facility]  PH: [phone]  F: [phone][EG1.2]    Plan:    Anticipated Disposition:[EG1.1]  Facility:  TBD[EG1.2]    Barriers to d/c plan:[EG1.1]  Placement[EG1.2]    Follow Up:[EG1.1]  SW will continue to follow    SANDEEP Gomez   Pager: [phone][EG1.2]     Revision History        User Key Date/Time User Provider Type Action    > EG1.3 12/1/2018 10:16 AM Destiney Lambert BSW  Addend     EG1.2 12/1/2018  8:49 AM Destiney Lambert BSW  Sign     EG1.1 12/1/2018  8:42 AM Destiney Lambert BSW                    Procedure Notes     No notes of this type exist for this encounter.         Progress Notes - Therapies (Notes from 12/01/18 through 12/04/18)      Progress Notes by Francisco Polo RT at 12/1/2018  2:21 PM     Author:  Francisco Polo RT Service:  (none) Author Type:  Respiratory Therapist    Filed:  12/1/2018  2:23 PM Date of Service:  12/1/2018  2:21 PM Creation Time:  12/1/2018  2:21 PM    Status:  Signed :  Francisco Polo, RT (Respiratory Therapist)         Bedside PFT performed x 3 attempts.  Results are        Test    1    2    3  FEV1 (L) 0.87  1.02  0.94  FVC (L) 1.03  1.20  1.14  FEV1/FVC 85%  84%  82%  PEF (l/s) 2.46  2.54  2.60[SM1.1]     Revision History         User Key Date/Time User Provider Type Action    > SM1.1 12/1/2018  2:23 PM Francisco Polo, RT Respiratory Therapist Sign

## 2018-11-28 NOTE — IP AVS SNAPSHOT
"          UNIT 6C [facility]: [phone]                                              INTERAGENCY TRANSFER FORM - LAB / IMAGING / EKG / EMG RESULTS   2018                    Hospital Admission Date: 2018  RG WALTER   : 1931  Sex: Female        Attending Provider: Talat Littlejohn MD     Allergies:  Cephalexin Hcl, Gabapentin, Naproxen, Perfume, Macrobid [Nitrofurantoin Anhydrous], Seasonal Allergies, Sulfa Drugs, Xanax [Alprazolam], Ciprofloxacin    Infection:  None   Service:  CARDIOLOGY    Ht:  1.613 m (5' 3.5\")   Wt:  70.9 kg (156 lb 4.8 oz)   Admission Wt:  70.8 kg (156 lb)    BMI:  27.25 kg/m 2   BSA:  1.78 m 2            Patient PCP Information     Provider PCP Type    Tre Lockett MD General         Lab Results - 3 Days      Basic metabolic panel [[id number]] (Abnormal)  Resulted: 18 0645, Result status: Final result    Ordering provider: Blu Ferreira MD  18 6042 Resulting lab: [facility]    Specimen Information    Type Source Collected On   Blood  18 0547          Components       Value Reference Range Flag Lab   Sodium 134 133 - 144 mmol/L  51   Potassium 4.4 3.4 - 5.3 mmol/L  51   Chloride 104 94 - 109 mmol/L  51   Carbon Dioxide 22 20 - 32 mmol/L  51   Anion Gap 8 3 - 14 mmol/L  51   Glucose 81 70 - 99 mg/dL  51   Urea Nitrogen 30 7 - 30 mg/dL  51   Creatinine 1.48 0.52 - 1.04 mg/dL H 51   GFR Estimate 33 >60 mL/min/1.7m2 L 51   Comment:  Non  GFR Calc   GFR Estimate If Black 40 >60 mL/min/1.7m2 L 51   Comment:  African American GFR Calc   Calcium 7.5 8.5 - 10.1 mg/dL L 51            CBC with platelets [[id number]] (Abnormal)  Resulted: 18 0626, Result status: Final result    Ordering provider: Blu Ferreira MD  18 9250 Resulting lab: [facility]    Specimen Information    Type Source Collected On   Blood  18 0547          " Components       Value Reference Range Flag Lab   WBC 5.9 4.0 - 11.0 10e9/L  51   RBC Count 3.22 3.8 - 5.2 10e12/L L 51   Hemoglobin 10.7 11.7 - 15.7 g/dL L 51   Hematocrit 32.6 35.0 - 47.0 % L 51    78 - 100 fl H 51   MCH 33.2 26.5 - 33.0 pg H 51   MCHC 32.8 31.5 - 36.5 g/dL  51   RDW 13.0 10.0 - 15.0 %  51   Platelet Count 232 150 - 450 10e9/L  51            Comprehensive metabolic panel [[id number]] (Abnormal)  Resulted: 12/02/18 0744, Result status: Final result    Ordering provider: Blu Ferreira MD  12/01/18 2300 Resulting lab: [facility]    Specimen Information    Type Source Collected On   Blood  12/02/18 0658          Components       Value Reference Range Flag Lab   Sodium 134 133 - 144 mmol/L  51   Potassium 4.2 3.4 - 5.3 mmol/L  51   Chloride 103 94 - 109 mmol/L  51   Carbon Dioxide 23 20 - 32 mmol/L  51   Anion Gap 8 3 - 14 mmol/L  51   Glucose 93 70 - 99 mg/dL  51   Urea Nitrogen 34 7 - 30 mg/dL H 51   Creatinine 1.41 0.52 - 1.04 mg/dL H 51   GFR Estimate 35 >60 mL/min/1.7m2 L 51   Comment:  Non  GFR Calc   GFR Estimate If Black 43 >60 mL/min/1.7m2 L 51   Comment:  African American GFR Calc   Calcium 7.3 8.5 - 10.1 mg/dL L 51   Bilirubin Total 0.4 0.2 - 1.3 mg/dL  51   Albumin 3.1 3.4 - 5.0 g/dL L 51   Protein Total 6.6 6.8 - 8.8 g/dL L 51   Alkaline Phosphatase 57 40 - 150 U/L  51   ALT 18 0 - 50 U/L  51   AST 19 0 - 45 U/L  51            CBC with platelets [[id number]] (Abnormal)  Resulted: 12/02/18 0722, Result status: Final result    Ordering provider: Blu Ferreira MD  12/01/18 7153 Resulting lab: [facility]    Specimen Information    Type Source Collected On   Blood  12/02/18 0658          Components       Value Reference Range Flag Lab   WBC 6.2 4.0 - 11.0 10e9/L  51   RBC Count 3.23 3.8 - 5.2 10e12/L L 51   Hemoglobin 10.6 11.7 - 15.7 g/dL L 51   Hematocrit 32.9 35.0 - 47.0 % L 51     78 - 100 fl H 51   MCH 32.8 26.5 - 33.0 pg  51   MCHC 32.2 31.5 - 36.5 g/dL  51   RDW 13.2 10.0 - 15.0 %  51   Platelet Count 237 150 - 450 10e9/L  51            Acetylcholine receptor binding [[id number]]  Resulted: 12/02/18 0702, Result status: In process    Ordering provider: Blu Ferreira MD  12/01/18 2300 Resulting lab: [facility]    Specimen Information    Type Source Collected On   Blood  12/02/18 0658            Striated muscle antibody IgG [[id number]]  Resulted: 12/02/18 0702, Result status: In process    Ordering provider: Blu Ferreira MD  12/01/18 2300 Resulting lab: [facility]    Specimen Information    Type Source Collected On   Blood  12/02/18 0658            Acetylcholine modulating antibody [[id number]]  Resulted: 12/02/18 0702, Result status: In process    Ordering provider: Blu Ferreira MD  12/01/18 2300 Resulting lab: [facility]    Specimen Information    Type Source Collected On   Blood  12/02/18 0658            Acetylcholine receptor blocking muriel [[id number]]  Resulted: 12/02/18 0702, Result status: In process    Ordering provider: Blu Ferreira MD  12/01/18 2300 Resulting lab: [facility]    Specimen Information    Type Source Collected On   Blood  12/02/18 0658            Vitamin B12 [[id number]]  Resulted: 12/01/18 1706, Result status: Final result    Ordering provider: Blu Ferreira MD  12/01/18 3481 Resulting lab: [facility]    Specimen Information    Type Source Collected On   Blood  12/01/18 1557          Components       Value Reference Range Flag Lab   Vitamin B12 983 193 - 986 pg/mL  51            Iron and iron binding capacity [[id number]]  Resulted: 12/01/18 1646, Result status: Final result    Ordering provider: Blu Ferreira MD  12/01/18 6987 Resulting lab: [facility]    Specimen Information    Type Source Collected On   Blood  12/01/18 1557          Components       Value Reference  Range Flag Lab   Iron 83 35 - 180 ug/dL  51   Iron Binding Cap 326 240 - 430 ug/dL  51   Iron Saturation Index 25 15 - 46 %  51            Ferritin [[id number]]  Resulted: 12/01/18 1646, Result status: Final result    Ordering provider: Blu Ferreira MD  12/01/18 1457 Resulting lab: [facility]    Specimen Information    Type Source Collected On   Blood  12/01/18 1557          Components       Value Reference Range Flag Lab   Ferritin 32 8 - 252 ng/mL  51            Methylmalonic acid [[id number]]  Resulted: 12/01/18 1558, Result status: In process    Ordering provider: Blu Ferreira MD  12/01/18 1457 Resulting lab: [facility]    Specimen Information    Type Source Collected On   Blood  12/01/18 1557            Nt probnp inpatient [[id number]]  Resulted: 12/01/18 1308, Result status: Final result    Ordering provider: Blu Ferreira MD  12/01/18 0759 Resulting lab: [facility]    Specimen Information    Type Source Collected On     12/01/18 0759          Components       Value Reference Range Flag Lab   N-Terminal Pro BNP Inpatient 1238 0 - 1800 pg/mL  51   Comment:            Reference range shown and results flagged as abnormal are suggested inpatient   cut points for confirming diagnosis if CHF in an acute setting. Establishing a   baseline value for each individual patient is useful for follow-up. An   inpatient or emergency department NT-proPBNP <300 pg/mL effectively rules out   acute CHF, with 99% negative predictive value.  The outpatient non-acute reference range for ruling out CHF is:   0-125 pg/mL (age 18 to less than 75)   0-450 pg/mL (age 75 yrs and older)              Erythrocyte sedimentation rate auto [[id number]]  Resulted: 12/01/18 0907, Result status: Final result    Ordering provider: Blu Ferreira MD  12/01/18 3305 Resulting lab: [facility]    Specimen Information    Type  Source Collected On     12/01/18 0759          Components       Value Reference Range Flag Lab   Sed Rate 28 0 - 30 mm/h  51            Basic metabolic panel [[id number]] (Abnormal)  Resulted: 12/01/18 0834, Result status: Final result    Ordering provider: Blu Ferreira MD  12/01/18 0756 Resulting lab: [facility]    Specimen Information    Type Source Collected On   Blood  12/01/18 0759          Components       Value Reference Range Flag Lab   Sodium 137 133 - 144 mmol/L  51   Potassium 4.0 3.4 - 5.3 mmol/L  51   Chloride 107 94 - 109 mmol/L  51   Carbon Dioxide 23 20 - 32 mmol/L  51   Anion Gap 7 3 - 14 mmol/L  51   Glucose 91 70 - 99 mg/dL  51   Urea Nitrogen 40 7 - 30 mg/dL H 51   Creatinine 1.57 0.52 - 1.04 mg/dL H 51   GFR Estimate 31 >60 mL/min/1.7m2 L 51   Comment:  Non  GFR Calc   GFR Estimate If Black 38 >60 mL/min/1.7m2 L 51   Comment:  African American GFR Calc   Calcium 7.4 8.5 - 10.1 mg/dL L 51            Magnesium [[id number]] (Abnormal)  Resulted: 12/01/18 0834, Result status: Final result    Ordering provider: Blu Ferreira MD  12/01/18 7824 Resulting lab: [facility]    Specimen Information    Type Source Collected On   Blood  12/01/18 0759          Components       Value Reference Range Flag Lab   Magnesium 2.4 1.6 - 2.3 mg/dL H 51            CRP inflammation [[id number]]  Resulted: 12/01/18 0834, Result status: Final result    Ordering provider: Blu Ferreira MD  12/01/18 1921 Resulting lab: [facility]    Specimen Information    Type Source Collected On     12/01/18 0759          Components       Value Reference Range Flag Lab   CRP Inflammation <2.9 0.0 - 8.0 mg/L  51            CBC with platelets [[id number]] (Abnormal)  Resulted: 12/01/18 0814, Result status: Final result    Ordering provider: Blu Ferreira MD  12/01/18 6509 Resulting lab:  [facility]    Specimen Information    Type Source Collected On   Blood  12/01/18 0759          Components       Value Reference Range Flag Lab   WBC 7.4 4.0 - 11.0 10e9/L  51   RBC Count 3.46 3.8 - 5.2 10e12/L L 51   Hemoglobin 11.5 11.7 - 15.7 g/dL L 51   Hematocrit 35.2 35.0 - 47.0 %  51    78 - 100 fl H 51   MCH 33.2 26.5 - 33.0 pg H 51   MCHC 32.7 31.5 - 36.5 g/dL  51   RDW 13.4 10.0 - 15.0 %  51   Platelet Count 239 150 - 450 10e9/L  51            Testing Performed By     Lab - Abbreviation Name Director Address Valid Date Range    45 - QTH069 [facility] Unknown Unknown 01/28/02 0000 - Present    51 - Unknown [facility] Unknown [address] [facility] [address] 12/31/14 1010 - Present            Unresulted Labs     None         Imaging Results - 3 Days      CT Chest w/o Contrast [[id number]]  Resulted: 12/01/18 1830, Result status: Final result    Ordering provider: Blu Ferreira MD  12/01/18 1423 Resulted by: Minna Stanton MD Lee, Mark Theodore, DO    Performed: 12/01/18 1447 - 12/01/18 1454 Resulting lab: RADIOLOGY RESULTS    Narrative:       EXAMINATION: Chest CT  without contrast     CLINICAL HISTORY: History of pulmonary fibrosis evaluate for ILD  flare..    COMPARISON: CT 9/11/2017, 2/20/2017 and 9/18/2012.    TECHNIQUE: CT imaging obtained through the chest without contrast.  Coronal and axial MIP reformatted images obtained.     FINDINGS:  Question trace secretions in the proximal trachea, otherwise the  central airways are clear. Lungs are hyperexpanded. Redemonstration of  peripheral and basilar predominant reticulation, architectural  distortion, and traction bronchiectasis. Basilar predominant  honeycombing is again noted and stable to slightly progressed from  either exam. No groundglass component. No focal consolidation, pleural  effusion, or pneumothorax. 5 mm solid pulmonary nodule right lower  lobe  (series 6 image 131), new from 9/11/2017. Multiple additional  small pulmonary nodules are noted, some of which are new and some  which are stable from prior exam.    Left chest wall cardiac device with leads in the right atrium and  right ventricle. Cardiomegaly. No significant pericardial effusion.  Ascending aorta and main pulmonary trunk are normal caliber. Mild  coronary artery calcifications. Scattered calcifications of the aortic  arch and proximal great vessels. 1.3 cm subcarinal lymph node is  unchanged. Tiny sliding-type hernia. Patulous esophagus with debris  extending to the proximal esophagus.    Bones and soft tissues: No acute fracture. Multilevel degenerative  changes of the spine. Soft tissues are unremarkable..     Partially imaged upper abdomen: Partial visualized cystic structure  arising from the body of the pancreas, (series 2 image 57), better  characterized on recent CT abdomen 11/17/2018. Circular rim  calcification in the left upper quadrant measuring 1.3 cm, small  splenic artery aneurysm.       Impression:       IMPRESSION:   1. Compared to 2/20/2017, stable to slightly progressed basilar and  peripheral predominant fibrotic changes consistent with UIP pattern.  No CT evidence of acute ILD flare.  2. New pulmonary nodules, largest measuring 5 mm in the right lower  lobe compared to 9/11/2017. Follow-up per Fleischner Society criteria.  Multiple additional stable small pulmonary nodules are noted.    I have personally reviewed the examination and initial interpretation  and I agree with the findings.    ERIC PATRICK MD      Testing Performed By     Lab - Abbreviation Name Director Address Valid Date Range    104 - Rad Rslts RADIOLOGY RESULTS Unknown Unknown 02/16/05 1553 - Present               ECG/EMG Results      Echo limited (Adults Only) [[id number]]  Resulted: 11/29/18 1124, Result status: Edited Result - FINAL    Ordering provider: Fredrick Will PA-C  11/29/18 3649  Resulted by: Baudilio Mckenzie MD    Performed: 18 1151 - 18 1151 Resulting lab: RADIOLOGY RESULTS    Narrative:       [id number]  [facility]  [id number]  [id number]^CHAD^YANDY^JORGE L           [facility]  Echocardiography Laboratory  [address] [place] [address]     Name: RG WALTER  MRN: [id number]  : 1931  Study Date: 2018 11:24 AM  Age: 87 yrs  Gender: Female  Patient Location: [facility]  Reason For Study: Mitral Valve Disorder  Ordering Physician: YANDY BONILLA  Performed By: Elver Smith RDCS     BSA: 1.7 m2  Height: 63 in  Weight: 156 lb  BP: 114/63 mmHg  _____________________________________________________________________________  __        Procedure  Limited Portable Echo Adult.  _____________________________________________________________________________  __        Interpretation Summary  Limited study.  Normal biventricular size and systolic function. The Ejection Fraction is  estimated at 55-60%.  Mild to moderate mitral insufficiency is present.  Mild to moderate tricuspid insufficiency is present.  Estimated pulmonary artery systolic pressure is 26 mmHg plus right atrial  pressure.  Compared to ECHO on 10/9, there are no significant changes. Mitral  regurgitation less prominent compared to recent YOLANDA (10/16).     _____________________________________________________________________________  __        Left Ventricle  Left ventricular size is normal. There is increased LV wall thickness. The  Ejection Fraction is estimated at 55-60%. No regional wall motion  abnormalities are seen.     Right Ventricle  The right ventricle is normal size. Global right ventricular function is  normal.     Atria  Moderate right atrial enlargement is present. Moderate left atrial enlargement  is present.        Mitral Valve  Mild to moderate mitral insufficiency is present.     Aortic Valve  Trace aortic insufficiency is present.     Tricuspid  Valve  Mild to moderate tricuspid insufficiency is present. Estimated pulmonary  artery systolic pressure is 26 mmHg plus right atrial pressure. There are  multiple TR jets.     Pulmonic Valve  The pulmonic valve cannot be assessed.     Vessels  The inferior vena cava is normal. The thoracic aorta cannot be assessed.     Pericardium  No pericardial effusion is present.        Compared to Previous Study  Compared to ECHO on 10/9, there are no significant changes. MR on YOLANDA (10/16)  appears more severe.  _____________________________________________________________________________  __           Doppler Measurements & Calculations  TV max P.5 mmHg  TR max adrian: 252.4 cm/sec  TR max P.5 mmHg     _____________________________________________________________________________  __           Report approved by: Blanca Adams 2018 02:40 PM       1    Type Source Collected On     18 1124          View Image (below)              Encounter-Level Documents:     There are no encounter-level documents.      Order-Level Documents:     There are no order-level documents.

## 2018-11-28 NOTE — TELEPHONE ENCOUNTER
"Patient called today reporting that last night she forgot her medications and today she feels \"so tired that I can barely get out of bed\" \"I haven't showered and I could barely get my cereal\".  Patient states her shortness of breath is the same as yesterday but her fatigue has worsened and she is concerned.  Patient was seen yesterday by Minna Gallagher NP cardiology at the [facility] clinic and was referred to the valve clinic at the [place] due to severe mitral regurgitation.  Writer spoke with son and daughter who vocalized that they did not see her today but spoke with her on the phone and patient is wanting to get admitted to move things along faster since today she feels miserable and is very fatigued.  Son reports that she has had shortness of breath and this is ongoing.  Daughter reports that patient went to [facility] last night and had a good time.    Writer discussed situation with Minna Gallagher NP.  Due to the worsening symptoms of fatigue it was decided for the patient to go to the ED to be evaluated.  This was communicated with the son and daughter whom both verbalized understanding.  The plan is they will get her there as soon as possible and if her symptoms worsen, patient to call 911.  Caller agreed to the plan and verbalized understanding.    Dina Swan RN  Care Coordinator  [facility] [email or web address]] [facility]  [phone]      "

## 2018-11-28 NOTE — LETTER
Transition Communication Hand-off for Care Transitions to Next Level of Care Provider    Name: Linda Spicer  : 1931  MRN #: [id number]  Primary Care Provider: Tre Lockett     Primary Clinic: [facility] [address]  [place] [facility] [address]     Reason for Hospitalization:  Near syncope [R55]  Light headedness [R42]  Fatigue, unspecified type [R53.83]  Atrial flutter, unspecified type (H) [I48.92]  Admit Date/Time: 2018  3:30 PM  Discharge Date: 18  Payor Source: Payor: MEDICARE / Plan: MEDICARE / Product Type: Medicare /     Readmission Assessment Measure (ZAHIRA) Risk Score/category: Elevated         Reason for Communication Hand-off Referral: Fragility    Discharge Plan:  [facility]  PH: [phone]  F: [phone]     Concern for non-adherence with plan of care:   Y/N N  Discharge Needs Assessment:  Needs      Most Recent Value   Equipment Currently Used at Home  raised toilet, shower chair, walker, rolling, wheelchair, power          Already enrolled in Tele-monitoring program and name of program:  MTM  Follow-up specialty is recommended: Yes    Follow-up plan:    Future Appointments   Date Time Provider Department Center   2018  2:00 PM José Miguel Mak MD [facility]   2018  9:45 AM BK LAB BK[facility]   2018  9:30 AM Karla Mabry NP [facility] PSA CLIN   2018  3:15 PM UC LAB UCLAB [facility]   2018  4:00 PM  CV DEVICE 1 UCCVCV [facility]   2018  4:30 PM Franki Carrasquillo MD [facility]   2018  9:00 AM BAY 9 INFUSION [facility] [place]   1/15/2019  2:40 PM Mario Davenport MD BKEU [facility]   2019 11:00 AM Prabhakar Mcdowell MD [facility]   2019 10:00 AM BAY 9 INFUSION [facility] [place]   2/15/2019 10:00 AM Lahti, ABY [facility]   3/1/2019 10:00 AM Asia Steven PA-C [facility] [place]   3/20/2019 12:00 AM UC ICD REMOTE UCCVSV [facility]       Any outstanding tests or  procedures:        Referrals     Future Labs/Procedures    Medication Therapy Management Referral     Comments:    MTM referral reason            Patient had a hospital or ED visit in last 6 months and has more than 10   PTA or Discharge medications    Patient has 5 PTA or Discharge Medications AND one of the following   diagnoses: DM,HF,COPD,AMI DX,PULM HTN       This service is designed to help you get the most from your medications.  A specially trained pharmacist will work closely with you and your doctors  to solve any problems related to your medications and to help you get the   best results from taking them.      The Medication Therapy Management staff will call you to schedule an appointment.    Neurology Adult Referral     Comments:    Evaluated by neurology during recent hospitalization. Outpatient follow up recommended by neurology provider for additional workup and management of dizziness, light headedness, and near syncope events along with weakness (MG panel pending at time of discharge).    Occupational Therapy Adult Consult     Comments:    Evaluate and treat as clinically indicated.    Reason:  Generalized weakness and dizziness    Physical Therapy Adult Consult     Comments:    Evaluate and treat as clinically indicated.    Reason:  Generalized weakness and dizziness            Key Recommendations:      None for today.  Thank you,    DENVER Bass, APSW  6[address] Unit   Phone: [phone]  Pager: [phone]  Unit: [phone]

## 2018-11-28 NOTE — ED TRIAGE NOTES
Pt arrived in triage with concerns of generalized weakness. Pt has been in and out of the hospital for A-fib, cardiac issues for the past few weeks. Pt says she continues to feel weak and wants further work up.

## 2018-11-28 NOTE — IP AVS SNAPSHOT
[address] [address]    Phone:  [phone]                                       After Visit Summary   11/28/2018    Linda Spicer    MRN: [id number]           After Visit Summary Signature Page     I have received my discharge instructions, and my questions have been answered. I have discussed any challenges I see with this plan with the nurse or doctor.    ..........................................................................................................................................  Patient/Patient Representative Signature      ..........................................................................................................................................  Patient Representative Print Name and Relationship to Patient    ..................................................               ................................................  Date                                   Time    ..........................................................................................................................................  Reviewed by Signature/Title    ...................................................              ..............................................  Date                                               Time          22EPIC Rev 08/18

## 2018-11-28 NOTE — TELEPHONE ENCOUNTER
FUTURE VISIT INFORMATION      FUTURE VISIT INFORMATION:    Date: 12/13/18    Time: 2:00 PM    Location: [facility] [place] - Cardiology  REFERRAL INFORMATION:    Referring provider:  Minna MOELLER CNP    Referring providers clinic:  [facility]    Reason for visit/diagnosis: Mitral Valve regurgitation    NOTES STATUS DETAILS   OFFICE NOTE from referring provider (last 1 yr) Internal 10/15/18, 10/8/18   OFFICE NOTE from other specialist Internal          Care Everywhere Rochelle Agrawal 11/1/18, 10/24/18, 10/18/18    Dr. Carrasquillo 9/19/18    Dr. Loza 9/17/18, 8/20/18, 2/27/18        Jourdan Dean, MPH, PA-C 6/29/18    Ruiz Alanis MD 6/26/18     DISCHARGE SUMMARY from hospital Internal 11/17/18, 10/16/18, 10/8/18, 9/14/18   DISCHARGE REPORT from the ER Care Everywhere 9/7/18, 7/26/18,7/2/18, 6/24/18,2/21/18, 12/4/17, 12/2/17   OPERATIVE REPORT N/A    MEDICATION LIST Internal    IMAGING     CTA CORONARY ARTERIES WITH CALCIUM SCORE STUDY (REPORT & IMAGES) N/A    CAROTID ULTRASOUND (REPORT & IMAGES) N/A    CORONARY ANGIOGRAM (REPORT & IMAGES) N/A    CT CHEST (REPORT & IMAGES) N/A    ECHO (YOLANDA OR TTE) (REPORT & IMAGES) Internal YOLANDA 10/16/18     PFT RESULTS Internal 2/14/18   VEIN MAPPING (REPORT & IMAGES) N/A    ANY OTHER CARDIAC IMAGING   (MRI, CT, ANGIOGRAM) (DISC & REPORT) Internal Chest XRay 11/17/18

## 2018-11-28 NOTE — IP AVS SNAPSHOT
"` `     [address] [facility]: [phone]                                              INTERAGENCY TRANSFER FORM - NURSING   2018                    Hospital Admission Date: 2018  RG WALTER   : 1931  Sex: Female        Attending Provider: Talat Littlejohn MD     Allergies:  Cephalexin Hcl, Gabapentin, Naproxen, Perfume, Macrobid [Nitrofurantoin Anhydrous], Seasonal Allergies, Sulfa Drugs, Xanax [Alprazolam], Ciprofloxacin    Infection:  None   Service:  CARDIOLOGY    Ht:  1.613 m (5' 3.5\")   Wt:  70.9 kg (156 lb 4.8 oz)   Admission Wt:  70.8 kg (156 lb)    BMI:  27.25 kg/m 2   BSA:  1.78 m 2            Patient PCP Information     Provider PCP Type    Tre Lockett MD General      Current Code Status     Date Active Code Status Order ID Comments User Context       2018  2:45 PM DNR [id number] OK with intubation Peggy Ramos MD Inpatient       Questions for Current Code Status     Question Answer Comment    Code status determined by: Discussion with patient/legal decision maker       Code Status History     Date Active Date Inactive Code Status Order ID Comments User Context    2018  2:07 PM 2018  2:45 PM DNR [id number] DNR but would be OKAY WITH INTUBATION Peggy Ramos MD Outpatient    2018 11:22 AM 2018  2:07 PM DNR [id number]  Ashley Justin MD Outpatient    2018  6:26 PM 2018 11:22 AM DNR [id number] Discussion with patient and family  DNR but would be OKAY WITH INTUBATION Ashley Justin MD Inpatient    10/12/2018  8:29 AM 2018  6:26 PM Full Code [id number]  Talat Umaña MD Outpatient    10/15/2015  7:48 AM 10/12/2018  8:29 AM Full Code [id number]  Fili Lockett MD Outpatient    10/13/2015  2:23 PM 10/15/2015  7:48 AM Full Code [id number]  Fili Lockett MD Inpatient    2014  7:36 AM 2014 11:29 AM Full Code [id number]  Shayla Wise RN Inpatient "      Advance Directives        Scanned docmt in ACP Activity?           Yes, scanned ACP docmt        Hospital Problems as of 12/4/2018              Priority Class Noted POA    Heart failure (H) Medium  11/28/2018 Yes      Non-Hospital Problems as of 12/4/2018              Priority Class Noted    Rheumatoid arthritis involving multiple sites with positive rheumatoid factor (H) Medium  8/19/2010    Hypertension goal BP (blood pressure) < 150/90 Medium  9/7/2012    Hypothyroidism Medium  9/7/2012    Macular degeneration, left eye Medium  5/7/2013    Irritable bowel syndrome Medium  10/29/2013    Encounter for palliative care Medium  5/18/2015    Adjustment disorder with anxious mood Medium  5/18/2015    Mild anemia Medium  9/25/2015    DDD (degenerative disc disease), lumbar Medium  9/25/2015    CKD (chronic kidney disease) stage 3, GFR 30-59 ml/min (H) Medium  9/29/2015    History of blood transfusion Medium  9/29/2015    Aftercare following surgery Medium  10/28/2015    S/P lumbar laminectomy Medium  10/28/2015    High risk medication use High  11/3/2015    Age-related osteoporosis without current pathological fracture High  11/3/2015    Atrophic vaginitis Medium  2/8/2016    Fecal incontinence Medium  2/8/2016    Female stress incontinence Medium  2/8/2016    Impingement syndrome of both shoulders Medium  4/5/2016    UIP (usual interstitial pneumonitis) (H) Medium  4/5/2016    High risk medications (not anticoagulants) long-term use Medium  4/5/2016    Heart failure with preserved ejection fraction (H) Medium  6/2/2016    Congestive heart failure with preserved LV function, NYHA class 3 (H) Medium  6/21/2016    Other specified hypothyroidism Medium  7/22/2016    Health Care Home Medium  1/13/2017    Sick sinus syndrome (H) Medium  2/19/2017    Cardiac pacemaker - Medtronic dual lead pacemaker - Not Dependent - MRI Safe Medium  7/5/2017    Immunosuppression (H) Medium  11/21/2017    ILD (interstitial lung disease)  (H) Medium  1/16/2018    Heart failure with preserved ejection fraction, NYHA class I (H) Medium  3/19/2018    Atrial flutter (H) Medium  7/31/2018    Paroxysmal atrial fibrillation (H) Medium  9/18/2018    CHF exacerbation (H) Medium  10/8/2018    Pre-syncope Medium  11/17/2018      Immunizations     Name Date      Influenza (High Dose) 3 valent vaccine 09/27/18     Influenza (High Dose) 3 valent vaccine 10/13/17     Influenza (High Dose) 3 valent vaccine 09/21/16     Influenza (High Dose) 3 valent vaccine 09/30/15     Influenza (High Dose) 3 valent vaccine 09/26/14     Influenza (High Dose) 3 valent vaccine 10/08/13     Influenza (High Dose) 3 valent vaccine 09/07/12     Influenza (IIV3) PF 08/29/11     Pneumo Conj 13-V (2010&after) 04/05/16     Pneumococcal 23 valent 10/04/10     Pneumococcal 23 valent 05/01/01     TD (ADULT, 7+) 07/01/08     TDAP Vaccine (Boostrix) 09/11/17          END      ASSESSMENT     Discharge Profile Flowsheet     DISCHARGE NEEDS ASSESSMENT     COMMUNICATION ASSESSMENT      Concerns To Be Addressed  care coordination/care conferences 03/19/18 0914   Patient's communication style  spoken language (English or Bilingual) 11/17/18 1055    Equipment Currently Used at Home  raised toilet;shower chair;walker, rolling;wheelchair, power 11/30/18 1237   FINAL RESOURCES      Equipment Used at Home  grab bar;raised toilet;shower chair 10/14/15 0903   Other Resources  Other (see comment) (Heart Failure Action Plan) 03/19/18 0914    FUNCTIONAL LEVEL CURRENT     Existing Resources/Services  Other (see comment) 09/12/14 1304    Ambulation  1 - assistive equipment 12/04/18 0208   SKIN      Transferring  1 - assistive equipment 12/04/18 0208   Inspection of bony prominences  Full except (identify areas not inspected) 12/04/18 1438    Toileting  2 - assistive person 12/04/18 0208   Inspection under devices  Full 12/03/18 2037    Bathing  1 - assistive equipment 12/04/18 0208   Skin WDL  ex 12/04/18 1323     "Dressing  0 - independent 12/04/18 0208   Skin Temperature  warm 12/04/18 1323    Eating  0 - independent 12/04/18 0208   Skin Elasticity  quick return to original state 12/03/18 1603    Communication  0 - understands/communicates without difficulty 12/04/18 0208   Skin Integrity  bruise(s) 12/04/18 1323    Swallowing  0 - swallows foods/liquids without difficulty 12/03/18 1603   Skin Moisture  dry 12/04/18 1323    GASTROINTESTINAL (ADULT,PEDIATRIC,OB)     Full except areas not inspected   Hip, left;Hip, right;Buttock, left;Buttock, right;Sacrum;Coccyx 12/04/18 1438    GI WDL  WDL 12/04/18 0831   SAFETY      Last Bowel Movement  12/03/18 12/04/18 0831   Safety WDL  WDL 12/04/18 1323    Passing flatus  yes 12/04/18 0831   All Alarms  none present 12/04/18 1323                 Assessment WDL (Within Defined Limits) Definitions           Safety WDL     Effective: 09/28/15    Row Information: <b>WDL Definition:</b> Bed in low position, wheels locked; call light in reach; upper side rails up x 2; ID band on<br> <font color=\"gray\"><i>Item=AS safety wdl>>List=AS safety wdl>>Version=F14</i></font>      Skin WDL     Effective: 09/28/15    Row Information: <b>WDL Definition:</b> Warm; dry; intact; elastic; without discoloration; pressure points without redness<br> <font color=\"gray\"><i>Item=AS skin wdl>>List=AS skin wdl>>Version=F14</i></font>      Vitals     Vital Signs Flowsheet     VITAL SIGNS     Functioning  can do everything I need to 12/03/18 1534    Temp  97.5  F (36.4  C) 12/04/18 1101   Sleep  normal sleep 12/03/18 1534    Temp src  Oral 12/04/18 1101   DL COMA SCALE      Resp  18 12/04/18 1101   Best Eye Response  4-->(E4) spontaneous 12/04/18 0822    Pulse  69 12/03/18 1933   Best Motor Response  6-->(M6) obeys commands 12/04/18 0822    Heart Rate  62 12/04/18 1101   Best Verbal Response  5-->(V5) oriented 12/04/18 0822    Pulse/Heart Rate Source  Monitor 12/03/18 1933   Dl Coma Scale Score  15 12/04/18 " "0822    BP  136/72 12/04/18 1101   HEIGHT AND WEIGHT      BP Location  Left arm 12/04/18 1101   Height  1.613 m (5' 3.5\") 11/28/18 2127    LYING ORTHOSTATIC BP     Height Method  Stated 11/28/18 2127    Lying Orthostatic BP  139/61 12/03/18 1451   Height Method  Stated 11/28/18 1530    Lying Orthostatic Pulse  61 bpm 12/01/18 1142   Weight  70.9 kg (156 lb 4.8 oz) 12/04/18 0316    SITTING ORTHOSTATIC BP     Weight Method  Standing scale 12/02/18 0234    Sitting Orthostatic BP  131/58 12/03/18 1448   BSA (Calculated - sq m)  1.78 11/28/18 2127    Sitting Orthostatic Pulse  64 bpm 12/01/18 1142   BMI (Calculated)  27.14 11/28/18 2127    STANDING ORTHOSTATIC BP     POSITIONING      Standing Orthostatic BP  150/63 12/03/18 1448   Body Position  independently positioning 12/04/18 0230    Standing Orthostatic Pulse  62 bpm 12/01/18 1142   Head of Bed (HOB)  HOB at 20 degrees 12/04/18 0230    OXYGEN THERAPY     Chair  Upright in chair 12/04/18 0818    SpO2  98 % 12/04/18 1101   Positioning/Transfer Devices  pillows 12/03/18 1603    O2 Device  None (Room air) 12/04/18 1101   DAILY CARE      Oxygen Delivery  2.5 LPM 12/04/18 0818   Activity Management  activity adjusted per tolerance 12/04/18 1323    PAIN/COMFORT     Activity Assistance Provided  assistance, 1 person 12/04/18 1323    Patient Currently in Pain  denies 12/04/18 1259   Assistive Device Utilized  walker 12/04/18 1323    Preferred Pain Scale  CAPA (Clinically Aligned Pain Assessment) ([facility] Adults Only) 12/04/18 1259   Symptoms Noted During/After Activity  shortness of breath 12/04/18 0230    CLINICALLY ALIGNED PAIN ASSESSMENT (CAPA) ([facility] ADULTS ONLY)     ECG      Comfort  negligible pain 12/03/18 2202   ECG Rhythm  Paced rhythm 12/04/18 1259    Change in Pain  about the same 12/03/18 1534   Ectopy  None 12/03/18 0428    Pain Control  fully effective 12/03/18 1534   Ectopy Frequency  Occasional 11/29/18 2356          "   Patient Lines/Drains/Airways Status    Active LINES/DRAINS/AIRWAYS     Name: Placement date: Placement time: Site: Days: Last dressing change:    Incision/Surgical Site 10/13/15 Midline;Posterior Back 10/13/15   0952    1148             Patient Lines/Drains/Airways Status    Active PICC/CVC     None            Intake/Output Detail Report     Date Intake   Output   Net    Shift P.O. IV Piggyback Total Urine Other Total       Andra 18 0700 - 18 1459 -- -- -- 700 -- 700 -700    Noc 18 1500 - 18 2359 210 -- 210 1300 -- 1300 -1090    Day 18 0000 - 18 0659 -- -- -- 200 -- 200 -200    Andra 18 0700 - 18 1459 440 -- 440 600 -- 600 -160    Noc 18 1500 - 18 2359 -- -- -- -- -- -- 0      Last Void/BM       Most Recent Value    Urine Occurrence 1 at 2018 1937    Stool Occurrence 1 at 2018 1937      Case Management/Discharge Planning     Case Management/Discharge Planning Flowsheet     LIVING ENVIRONMENT     Existing Resources/Services  Other (see comment) 14 1304    Lives With  alone 18 1237   ABUSE RISK SCREEN      Living Arrangements  independent living facility 18 1237   QUESTION TO PATIENT:  Has a member of your family or a partner(now or in the past) intimidated, hurt, manipulated, or controlled you in any way?  no 18 1526    COPING/STRESS     QUESTION TO PATIENT: Do you feel safe going back to the place where you are living?  yes 18 1526    Major Change/Loss/Stressor  death (  in sept) 18   OBSERVATION: Is there reason to believe there has been maltreatment of a vulnerable adult (ie. Physical/Sexual/Emotional abuse, self neglect, lack of adequate food, shelter, medical care, or financial exploitation)?  no 18 1526    ASSESSMENT/CONCERNS TO BE ADDRESSED     OTHER      Concerns To Be Addressed  care coordination/care conferences 18 0914   Are you depressed or being treated for depression?  No  11/28/18 2102    DISCHARGE PLANNING     HOMICIDE RISK      Equipment Used at Home  grab bar;raised toilet;shower chair 10/14/15 0903   Feels Like Hurting Others  no 11/28/18 1526    FINAL RESOURCES     / CAREGIVER      Equipment Currently Used at Home  raised toilet;shower chair;walker, rolling;wheelchair, power 11/30/18 1237   Filed Complexity Screen Score  11 12/03/18 1257    Other Resources  Other (see comment) (Heart Failure Action Plan) 03/19/18 9729

## 2018-11-28 NOTE — ED PROVIDER NOTES
"  History     Chief Complaint   Patient presents with     Generalized Weakness     The history is provided by the patient and medical records.     Linda Spicer is a 87 year old female with a history of pulmonary fibrosis, congestive heart failure (LV EF 55-60% on 10/16/2018), cardiac pacemaker (Medtronic dual lead pacemaker), atrial flutter, atrial fibrillation, hypertension, CKD stage III, immunosuppression, and ILD, who presents to the Emergency Department for evaluation of generalized weakness.  The patient reports yesterday (11/27/2018) she felt \"lousy\", and after taking a shower she was not able to make herself breakfast. The patient reports today that after dressing herself, she couldn't make herself breakfast, and states she is \"getting weaker and weaker\" and \"I can't hardly do anything\". The patient reports that she feels as though she is going to faint, and is \"just not feeling good\". The patient reports chest pain that presented last night and earlier today, however, has resolved today; patient reports no chest pain at this time.  She complains of shortness of breath that is worse than her baseline.  The patient denies cough, fever, or vomiting.  Patient denies any history of myocardial infarction.  The patient endorses taking diuretics and blood thinners.  The patient also reports she has a history of \"irregular blood pressure\".    Social: The patient lives at an independent living facility. The patient reports her son dropped her off at the Emergency Department.    I have reviewed the Medications, Allergies, Past Medical and Surgical History, and Social History in the Epic system.    Review of Systems   Constitutional: Positive for fatigue. Negative for chills and fever.   HENT: Negative for congestion.    Eyes: Negative for visual disturbance.   Respiratory: Positive for shortness of breath.    Cardiovascular: Negative for chest pain.   Gastrointestinal: Negative for abdominal pain, nausea and " "vomiting.   Endocrine: Negative for polydipsia and polyuria.   Genitourinary: Negative for difficulty urinating.   Musculoskeletal: Negative for back pain, neck pain and neck stiffness.   Skin: Negative for color change.   Allergic/Immunologic: Negative for immunocompromised state.   Neurological: Positive for weakness ( Generalized) and light-headedness. Negative for syncope and headaches.   Hematological: Negative for adenopathy.   Psychiatric/Behavioral: Negative for agitation and behavioral problems.   All other systems reviewed and are negative.      Physical Exam   BP: 154/59  Pulse: 65  Heart Rate: 63  Temp: 98.1  F (36.7  C)  Resp: 18  Height: 160 cm (5' 3\")  Weight: 70.8 kg (156 lb)  SpO2: 100 %      Physical Exam   Constitutional: She is oriented to person, place, and time. She appears well-developed and well-nourished. No distress.   HENT:   Head: Normocephalic and atraumatic.   Mouth/Throat: Oropharynx is clear and moist. No oropharyngeal exudate.   Eyes: Conjunctivae and EOM are normal. Pupils are equal, round, and reactive to light. No scleral icterus.   Neck: Normal range of motion. Neck supple.   Cardiovascular: Normal rate, normal heart sounds and intact distal pulses.    Pulmonary/Chest: Effort normal. No respiratory distress. She has no wheezes. She has rales ( Bilateral, posterior, lower lobes).   Abdominal: Soft. Bowel sounds are normal. She exhibits no distension. There is no tenderness. There is no rebound and no guarding.   Musculoskeletal: Normal range of motion. She exhibits edema ( 2 b/l LEs). She exhibits no tenderness.   Neurological: She is alert and oriented to person, place, and time. She has normal strength. No cranial nerve deficit or sensory deficit. She exhibits normal muscle tone. Coordination normal. GCS eye subscore is 4. GCS verbal subscore is 5. GCS motor subscore is 6.   Skin: Skin is warm. No rash noted. She is not diaphoretic.   Psychiatric: She has a normal mood and " affect. Her behavior is normal. Judgment and thought content normal.   Nursing note and vitals reviewed.      ED Course   4:07 PM  The patient was seen and examined by Dr. Otoole in Room ED07.     ED Course     Procedures             EKG Interpretation:      Interpreted by Jagjit Otoole  Time reviewed: 3:49 PM  Symptoms at time of EKG: Fatigue  Rhythm: Atrial flutter  Rate: normal  Axis: LAD  Ectopy: none  Conduction: normal  ST Segments/ T Waves: No ST-T wave changes  Q Waves: none  Comparison to prior: Unchanged from EKG done on October 15, 2018    Clinical Impression: Atrial flutter          Critical Care time:  none             Labs Ordered and Resulted from Time of ED Arrival Up to the Time of Departure from the ED   CBC WITH PLATELETS DIFFERENTIAL - Abnormal; Notable for the following:        Result Value    RBC Count 3.74 (*)      (*)     MCH 33.2 (*)     All other components within normal limits   COMPREHENSIVE METABOLIC PANEL - Abnormal; Notable for the following:     Glucose 110 (*)     Urea Nitrogen 41 (*)     Creatinine 1.69 (*)     GFR Estimate 29 (*)     GFR Estimate If Black 35 (*)     Calcium 7.8 (*)     All other components within normal limits   ROUTINE UA WITH MICROSCOPIC - Abnormal; Notable for the following:     Leukocyte Esterase Urine Small (*)     All other components within normal limits   NT PROBNP INPATIENT - Abnormal; Notable for the following:     N-Terminal Pro BNP Inpatient 2473 (*)     All other components within normal limits   TSH   TROPONIN I   PERIPHERAL IV CATHETER   CARDIAC CONTINUOUS MONITORING   PULSE OXIMETRY NURSING            Assessments & Plan (with Medical Decision Making)   Linda Spicer is a 87 year old female presenting with fatigue and lightheadedness and generalized weakness for 2 days.  Differential diagnosis: Dehydration, electrode normalities, arrhythmia, ACS, thyroid disorder, urinary tract infection, fluid overload, CHF exacerbation.    After thorough  history and recent physical exam patient appears to be in no acute distress.  I will obtain EKG, chest x-ray, and laboratory studies for further diagnostic evaluation. Patient agrees with the plan.     Patient's laboratory studies returned with no leukocytosis, WBC is normal at 6200.  Hemoglobin is 12.4 without any evidence of anemia.  Electrolytes show slight elevation of creatinine 1.69. However, this appears to be close to patient's baseline.  LFTs are normal.  TSH is normal 0.87.  BNP is elevated 2473. EKG showed no acute ischemia.  Troponin is undetectable.  Urinalysis shows no evidence of infection.  I reviewed her chest x-ray and  read the radiology report; there are mildly increased bibasilar opacities which could represent atelectasis versus infection or progression of her ILD.  Patient shows no infectious signs, and I do not I do not suspect that she has acute pneumonia.  Case was discussed with cardiology team given that she was recently admitted to their service and Dr. Holloway recommended admitting the patient to cardiology for further evaluation management.  I believe this is a reasonable plan.  She agrees with this plan as well.  She was given a dose of IV Lasix in the emergency department for diuresis per    This part of the medical record was transcribed by Jaylon Maldonado Medical Scribe, from a dictation done by Jagjit Otoole MD.      I have reviewed the nursing notes.    I have reviewed the findings, diagnosis, plan and need for follow up with the patient.    New Prescriptions    No medications on file       Final diagnoses:   Fatigue, unspecified type   Near syncope   Light headedness   Atrial flutter, unspecified type (H)   I, Darryl Fajardo, am serving as a trained medical scribe to document services personally performed by Jagjit Otoole MD, based on the provider's statements to me.      IJagjit MD, was physically present and have reviewed and verified the accuracy of this note  documented by Darryl Fajardo.     11/28/2018   [facility], [place], EMERGENCY DEPARTMENT     Jagjit Otoole MD  11/28/18 0160

## 2018-11-29 ENCOUNTER — ALLIED HEALTH/NURSE VISIT (OUTPATIENT)
Dept: CARDIOLOGY | Facility: CLINIC | Age: 83
DRG: 291 | End: 2018-11-29
Attending: INTERNAL MEDICINE
Payer: MEDICARE

## 2018-11-29 ENCOUNTER — APPOINTMENT (OUTPATIENT)
Dept: CARDIOLOGY | Facility: CLINIC | Age: 83
DRG: 291 | End: 2018-11-29
Attending: PHYSICIAN ASSISTANT
Payer: MEDICARE

## 2018-11-29 DIAGNOSIS — I49.5 SICK SINUS SYNDROME (H): Primary | ICD-10-CM

## 2018-11-29 DIAGNOSIS — J84.9 ILD (INTERSTITIAL LUNG DISEASE) (H): Primary | ICD-10-CM

## 2018-11-29 LAB
ANION GAP SERPL CALCULATED.3IONS-SCNC: 7 MMOL/L (ref 3–14)
ANION GAP SERPL CALCULATED.3IONS-SCNC: 7 MMOL/L (ref 3–14)
BUN SERPL-MCNC: 38 MG/DL (ref 7–30)
BUN SERPL-MCNC: 47 MG/DL (ref 7–30)
CALCIUM SERPL-MCNC: 7.5 MG/DL (ref 8.5–10.1)
CALCIUM SERPL-MCNC: 7.7 MG/DL (ref 8.5–10.1)
CHLORIDE SERPL-SCNC: 104 MMOL/L (ref 94–109)
CHLORIDE SERPL-SCNC: 106 MMOL/L (ref 94–109)
CO2 SERPL-SCNC: 26 MMOL/L (ref 20–32)
CO2 SERPL-SCNC: 28 MMOL/L (ref 20–32)
CREAT SERPL-MCNC: 1.57 MG/DL (ref 0.52–1.04)
CREAT SERPL-MCNC: 1.79 MG/DL (ref 0.52–1.04)
ERYTHROCYTE [DISTWIDTH] IN BLOOD BY AUTOMATED COUNT: 13.6 % (ref 10–15)
GFR SERPL CREATININE-BSD FRML MDRD: 27 ML/MIN/1.7M2
GFR SERPL CREATININE-BSD FRML MDRD: 31 ML/MIN/1.7M2
GLUCOSE SERPL-MCNC: 106 MG/DL (ref 70–99)
GLUCOSE SERPL-MCNC: 93 MG/DL (ref 70–99)
HCT VFR BLD AUTO: 37.8 % (ref 35–47)
HGB BLD-MCNC: 12.2 G/DL (ref 11.7–15.7)
INTERPRETATION ECG - MUSE: NORMAL
MCH RBC QN AUTO: 32.9 PG (ref 26.5–33)
MCHC RBC AUTO-ENTMCNC: 32.3 G/DL (ref 31.5–36.5)
MCV RBC AUTO: 102 FL (ref 78–100)
PLATELET # BLD AUTO: 249 10E9/L (ref 150–450)
POTASSIUM SERPL-SCNC: 3.6 MMOL/L (ref 3.4–5.3)
POTASSIUM SERPL-SCNC: 4 MMOL/L (ref 3.4–5.3)
PROCALCITONIN SERPL-MCNC: <0.05 NG/ML
RBC # BLD AUTO: 3.71 10E12/L (ref 3.8–5.2)
SODIUM SERPL-SCNC: 137 MMOL/L (ref 133–144)
SODIUM SERPL-SCNC: 141 MMOL/L (ref 133–144)
WBC # BLD AUTO: 6.7 10E9/L (ref 4–11)

## 2018-11-29 PROCEDURE — 93325 DOPPLER ECHO COLOR FLOW MAPG: CPT

## 2018-11-29 PROCEDURE — 21400006 ZZH R&B CCU INTERMEDIATE UMMC

## 2018-11-29 PROCEDURE — 93280 PM DEVICE PROGR EVAL DUAL: CPT | Mod: 26 | Performed by: INTERNAL MEDICINE

## 2018-11-29 PROCEDURE — 93308 TTE F-UP OR LMTD: CPT | Mod: 26 | Performed by: INTERNAL MEDICINE

## 2018-11-29 PROCEDURE — 25000132 ZZH RX MED GY IP 250 OP 250 PS 637: Performed by: STUDENT IN AN ORGANIZED HEALTH CARE EDUCATION/TRAINING PROGRAM

## 2018-11-29 PROCEDURE — 99232 SBSQ HOSP IP/OBS MODERATE 35: CPT | Mod: 25 | Performed by: INTERNAL MEDICINE

## 2018-11-29 PROCEDURE — A9270 NON-COVERED ITEM OR SERVICE: HCPCS | Performed by: STUDENT IN AN ORGANIZED HEALTH CARE EDUCATION/TRAINING PROGRAM

## 2018-11-29 PROCEDURE — 93280 PM DEVICE PROGR EVAL DUAL: CPT | Mod: ZF

## 2018-11-29 PROCEDURE — 80048 BASIC METABOLIC PNL TOTAL CA: CPT | Performed by: STUDENT IN AN ORGANIZED HEALTH CARE EDUCATION/TRAINING PROGRAM

## 2018-11-29 PROCEDURE — 25000131 ZZH RX MED GY IP 250 OP 636 PS 637: Mod: GY | Performed by: STUDENT IN AN ORGANIZED HEALTH CARE EDUCATION/TRAINING PROGRAM

## 2018-11-29 PROCEDURE — 85027 COMPLETE CBC AUTOMATED: CPT | Performed by: STUDENT IN AN ORGANIZED HEALTH CARE EDUCATION/TRAINING PROGRAM

## 2018-11-29 PROCEDURE — 93325 DOPPLER ECHO COLOR FLOW MAPG: CPT | Mod: 26 | Performed by: INTERNAL MEDICINE

## 2018-11-29 PROCEDURE — 25000128 H RX IP 250 OP 636: Performed by: PHYSICIAN ASSISTANT

## 2018-11-29 PROCEDURE — 36415 COLL VENOUS BLD VENIPUNCTURE: CPT | Performed by: STUDENT IN AN ORGANIZED HEALTH CARE EDUCATION/TRAINING PROGRAM

## 2018-11-29 PROCEDURE — 93321 DOPPLER ECHO F-UP/LMTD STD: CPT | Mod: 26 | Performed by: INTERNAL MEDICINE

## 2018-11-29 PROCEDURE — 84145 PROCALCITONIN (PCT): CPT | Performed by: STUDENT IN AN ORGANIZED HEALTH CARE EDUCATION/TRAINING PROGRAM

## 2018-11-29 PROCEDURE — 80048 BASIC METABOLIC PNL TOTAL CA: CPT | Performed by: PHYSICIAN ASSISTANT

## 2018-11-29 PROCEDURE — 25000128 H RX IP 250 OP 636: Performed by: STUDENT IN AN ORGANIZED HEALTH CARE EDUCATION/TRAINING PROGRAM

## 2018-11-29 PROCEDURE — 36415 COLL VENOUS BLD VENIPUNCTURE: CPT | Performed by: PHYSICIAN ASSISTANT

## 2018-11-29 RX ORDER — POTASSIUM CHLORIDE 750 MG/1
20 TABLET, EXTENDED RELEASE ORAL ONCE
Status: COMPLETED | OUTPATIENT
Start: 2018-11-29 | End: 2018-11-29

## 2018-11-29 RX ORDER — FUROSEMIDE 10 MG/ML
80 INJECTION INTRAMUSCULAR; INTRAVENOUS ONCE
Status: COMPLETED | OUTPATIENT
Start: 2018-11-29 | End: 2018-11-29

## 2018-11-29 RX ORDER — SPIRONOLACTONE 25 MG
12.5 TABLET ORAL DAILY
Status: DISCONTINUED | OUTPATIENT
Start: 2018-11-29 | End: 2018-12-04 | Stop reason: HOSPADM

## 2018-11-29 RX ORDER — ISOSORBIDE DINITRATE 10 MG/1
10 TABLET ORAL
Status: DISCONTINUED | OUTPATIENT
Start: 2018-11-29 | End: 2018-12-03

## 2018-11-29 RX ORDER — HYDRALAZINE HYDROCHLORIDE 10 MG/1
10 TABLET, FILM COATED ORAL 3 TIMES DAILY
Status: DISCONTINUED | OUTPATIENT
Start: 2018-11-29 | End: 2018-12-03

## 2018-11-29 RX ADMIN — HYDRALAZINE HYDROCHLORIDE 10 MG: 10 TABLET, FILM COATED ORAL at 14:26

## 2018-11-29 RX ADMIN — HYDRALAZINE HYDROCHLORIDE 10 MG: 10 TABLET, FILM COATED ORAL at 20:55

## 2018-11-29 RX ADMIN — ISOSORBIDE DINITRATE 10 MG: 10 TABLET ORAL at 12:49

## 2018-11-29 RX ADMIN — APIXABAN 2.5 MG: 2.5 TABLET, FILM COATED ORAL at 20:55

## 2018-11-29 RX ADMIN — ISOSORBIDE DINITRATE 10 MG: 10 TABLET ORAL at 17:49

## 2018-11-29 RX ADMIN — APIXABAN 2.5 MG: 2.5 TABLET, FILM COATED ORAL at 08:16

## 2018-11-29 RX ADMIN — HYDRALAZINE HYDROCHLORIDE 10 MG: 10 TABLET, FILM COATED ORAL at 08:16

## 2018-11-29 RX ADMIN — AZATHIOPRINE 50 MG: 50 TABLET ORAL at 20:55

## 2018-11-29 RX ADMIN — CARVEDILOL 3.12 MG: 3.12 TABLET, FILM COATED ORAL at 08:16

## 2018-11-29 RX ADMIN — ISOSORBIDE DINITRATE 10 MG: 10 TABLET ORAL at 08:16

## 2018-11-29 RX ADMIN — RANITIDINE 150 MG: 150 TABLET ORAL at 20:54

## 2018-11-29 RX ADMIN — POTASSIUM CHLORIDE 20 MEQ: 750 TABLET, EXTENDED RELEASE ORAL at 02:36

## 2018-11-29 RX ADMIN — LEVOTHYROXINE SODIUM 75 MCG: 75 TABLET ORAL at 08:16

## 2018-11-29 RX ADMIN — FUROSEMIDE 80 MG: 10 INJECTION, SOLUTION INTRAVENOUS at 12:50

## 2018-11-29 RX ADMIN — CARVEDILOL 3.12 MG: 3.12 TABLET, FILM COATED ORAL at 17:49

## 2018-11-29 RX ADMIN — Medication 12.5 MG: at 08:16

## 2018-11-29 RX ADMIN — FOLIC ACID 1 MG: 1 TABLET ORAL at 08:16

## 2018-11-29 RX ADMIN — Medication 50 MG: at 20:55

## 2018-11-29 RX ADMIN — FUROSEMIDE 80 MG: 10 INJECTION, SOLUTION INTRAVENOUS at 02:36

## 2018-11-29 ASSESSMENT — ACTIVITIES OF DAILY LIVING (ADL)
ADLS_ACUITY_SCORE: 18
ADLS_ACUITY_SCORE: 18
ADLS_ACUITY_SCORE: 19
ADLS_ACUITY_SCORE: 18

## 2018-11-29 NOTE — MR AVS SNAPSHOT
After Visit Summary   11/29/2018    Linda Spicer    MRN: [id number]           Patient Information     Date Of Birth          6/13/1931        Visit Information        Provider Department      11/29/2018 6:00 AM UC CV DEVICE 2 [facility]        Today's Diagnoses     Sick sinus syndrome (H)    -  1       Follow-ups after your visit        Your next 10 appointments already scheduled     Dec 04, 2018  9:00 AM CST   Office Visit with PFT LAB   [facility] ([facility])    [address] [place] [id number] [phone]           Bring a current list of meds and any records pertaining to this visit. For Physicals, please bring immunization records and any forms needing to be filled out. Please arrive 10 minutes early to complete paperwork.            Dec 13, 2018  2:00 PM CST   (Arrive by 1:45 PM)   New Patient Visit with José Miguel Mka MD   [facility] ([facility])    [address] [place] [id number] [phone]            Dec 14, 2018  9:45 AM CST   LAB with BK LAB   [facility] ([facility])    [address] [facility] [id number] [phone]           Please do not eat 10-12 hours before your appointment if you are coming in fasting for labs on lipids, cholesterol, or glucose (sugar). This does not apply to pregnant women. Water, hot tea and black coffee (with nothing added) are okay. Do not drink other fluids, diet soda or chew gum.            Dec 17, 2018  9:30 AM CST   Core Return with Karla Mabry NP   [facility] ([facility])    [address] [facility] [address] [facility] [address] [phone]            Dec 19, 2018  3:15 PM CST   Lab with UC LAB   [facility])    [address] [place] [address]  [address]   [phone]            Dec 19, 2018  4:00 PM CST   (Arrive by 3:45 PM)   Pacemaker Check with Uc Cv Device 1   [facility] ([facility])    [address] [facility]  3rd Floor  [phone]               Dec 19, 2018  4:30 PM CST   (Arrive by 4:15 PM)   RETURN ARRHYTHMIA with Franki Carrasquillo MD   [facility] ([facility])    [address] [facility]  [address]  [facility] [address] [phone]            Dec 27, 2018  9:00 AM CST   Level 1 with BAY  INFUSION   [facility] ([facility])    [address] [place] [id number] [phone]            Jim 15, 2019  2:40 PM CST   Return Visit with Mario Davenport MD   [facility] ([facility])    [address] [facility] [id number] [phone]            Jan 24, 2019 10:00 AM CST   Level 1 with BAY  INFUSION   [facility] ([facility])    [address] [place] [id number] [phone]              Future tests that were ordered for you today     Open Standing Orders        Priority Remaining Interval Expires Ordered    EKG 12-lead, tracing only STAT 100/100 CONDITIONAL (SPECIFY)  11/28/2018    Oxygen: Nasal cannula Routine 26513/24117 PRN  11/28/2018          Open Future Orders        Priority Expected Expires Ordered    Basic metabolic panel Routine 12/14/2018 11/28/2019 11/28/2018            Who to contact     If you have questions or need follow up information about today's clinic visit or your schedule please contact [facility] directly at [phone].  Normal or non-critical lab and imaging results will be communicated to you by MyChart, letter or phone within 4 business days after the clinic has received the results. If you do not hear from us within 7 days, please contact the clinic through MyChart or phone. If you have a  critical or abnormal lab result, we will notify you by phone as soon as possible.  Submit refill requests through My [facility] or call your pharmacy and they will forward the refill request to us. Please allow 3 business days for your refill to be completed.          Additional Information About Your Visit        MyChart Information     My [facility]I gives you secure access to your electronic health record. If you see a primary care provider, you can also send messages to your care team and make appointments. If you have questions, please call your primary care clinic.  If you do not have a primary care provider, please call [phone] and they will assist you.        Care EveryWhere ID     This is your Care EveryWhere ID. This could be used by other organizations to access your [place] medical records  [id number]        Your Vitals Were     Last Period                   (LMP Unknown)            Blood Pressure from Last 3 Encounters:   11/29/18 114/63   11/27/18 131/78   11/26/18 124/79    Weight from Last 3 Encounters:   11/29/18 71 kg (156 lb 8 oz)   11/27/18 73 kg (161 lb)   11/26/18 72.8 kg (160 lb 6.4 oz)              We Performed the Following     (77179)PM DEVICE PROGRAMMING EVAL, DUAL LEAD PACER          Today's Medication Changes      Notice     This visit is during an admission. Changes to the med list made in this visit will be reflected in the After Visit Summary of the admission.             Primary Care Provider Office Phone # Fax #    Tre Lockett [phone]       [address] [facility] [address]        Goals        General    #1 I will complete my health care directive. (pt-stated)     Notes - Note edited  8/21/2018 10:39 AM by Behl, Melissa K, RN    Goal Statement: I will complete my health care directive.  Measure of Success: Health care directive will be completed and scanned into chart.  Supportive Steps to Achieve: Patient has attended a health care directive class,  10/24/17 informed of CPR vs intubation  Barriers: is awaiting to see her  to finalize document  Strengths: has care coordination, home care and excellent family support  Date to Achieve By: 12/31/18  Patient expressed understanding of goal: yes             #2 Healthy Eating (pt-stated)     Notes - Note created  10/30/2018  3:58 PM by Behl, Melissa K, RN    Goal Statement: I will follow a low sodium diet.  Measure of Success: Patient will consistently eat a low sodium diet.  Supportive Steps to Achieve: RN CC will mail out low sodium diet ideas.  Family providing assistance.  Barriers: not always able to cook for self  Strengths: family support  Date to Achieve By: 12/30/18  Patient expressed understanding of goal: yes           Equal Access to Services     [facility] Hospital[facility]ON : Paul Goyal, wabrigida quinn, qasabihata kaalmada danida, zahraa moss . So [facility] [phone].    ATENCIÓN: Si habla español, tiene a merchant disposición servicios gratuitos de asistencia lingüística. Llame al [phone].    We comply with applicable federal civil rights laws and [place] laws. We do not discriminate on the basis of race, color, national origin, age, disability, sex, sexual orientation, or gender identity.            Thank you!     Thank you for choosing [facility]  for your care. Our goal is always to provide you with excellent care. Hearing back from our patients is one way we can continue to improve our services. Please take a few minutes to complete the written survey that you may receive in the mail after your visit with us. Thank you!             Your Updated Medication List - Protect others around you: Learn how to safely use, store and throw away your medicines at www.disposemymeds.org.      Notice     This visit is during an admission. Changes to the med list made in this visit will be reflected in the After Visit Summary of the admission.

## 2018-11-29 NOTE — PROGRESS NOTES
"Social Work Services Progress Note     Hospital Day: 1  Date of Initial Social Work Evaluation:  Not yet assessed  Collaborated with:  Pt, Arjun (son), Toya (daughter), Cards 1 Team     Data: Pt is a 87 year old female being followed by SW for possible discharge to TCU.       Intervention:  SW met with pt, son and daughter to discuss possible options for TCU. Arjun (son) explained he and Toya would like additional answers regarding pt's care. They are unsure of treatment plan and would like to learn more before they move forward with exploring rehab. Pt was previously at [facility] and pt and family do not prefer that facility. Pt did not feel her dietary needs were met and will only consider going back if they make changes. Arjun is hesitant about [facility] by saying he didn't get a \"good feel\" from the facility and the employees. Pt and family agree that going home hasn't been working and are interested in exploring new options. SW provided medicare list of providers for family to review while they wait for PT input.    Before viewing medicare list, Pt and family are interested in [place], [facility], as well as the [place] area. Pt and family can be flexible with distance if need be.     - Referrals in Process:    None at this time.     Assessment: Pt in pleasant mood. Family was cooperative and appearing concerned about care and steps to take moving forward.     Plan:    Anticipated Disposition: Facility:  Likely TCU    Barriers to d/c plan: medical stability, bed availability    Follow Up: SW will continue to follow for discharge planning to TCU- pending PT/OT recommendations.     Jesenia Campoverde  6C Social Work Intern  Phone: [phone]  Unit: [phone]      ___________________________________________________________________________________________________________________________________________________    Referrals Discontinued:  [facility]-  and family no longer " interested.  PH: [phone]  F: [phone]      Community Case Management/Community Services in place:   None.

## 2018-11-29 NOTE — PLAN OF CARE
"Problem: Patient Care Overview  Goal: Plan of Care/Patient Progress Review  Outcome: No Change  /66 (BP Location: Left arm, Cuff Size: Adult Regular)  Pulse 65  Temp 98.3  F (36.8  C) (Oral)  Resp 16  Ht 1.613 m (5' 3.5\")  Wt 71 kg (156 lb 8 oz)  LMP  (LMP Unknown)  SpO2 97%  BMI 27.29 kg/m2     Neuro: A/Ox4  Cardiac: Paced/A flutter rhythm. AOVSS. Afebrile.   Respiratory: C/o SOB at rest and RUSSELL. Shallow RR. On room air.   GI/: Received 80 mg IV lasix. Adequate UOP. LBM yesterday  Diet/Appetite: 2gm Na diet. Denies nausea  Skin: No new skin deficits noted  LDA: PIV saline locked  Activity: Up with assist of 1 and walker. C/o being lightheaded and dizzy at times. Encouraged pt to take it slow. History of falls. Pt calls appropriately  Pain: Denies  Plan: PFTs scheduled for 0900. Continue to monitor and follow POC. Notify MD with any changes or concerns.         "

## 2018-11-29 NOTE — PROGRESS NOTES
[facility]   Cardiology Consults  Progress Note     Interval History:  - Continues to be short of breath despite IV lasix, however responded well to 80mg dose  - syncopal episode during PFT. Suspect neurocardiogenic, will interrogate device.    Physical Exam:  Temp:  [97.5  F (36.4  C)-98.3  F (36.8  C)] 98.3  F (36.8  C)  Pulse:  [65] 65  Heart Rate:  [59-63] 62  Resp:  [14-18] 16  BP: (131-164)/(53-89) 132/65  SpO2:  [47 %-100 %] 95 %    I/O:    Wt:   Wt Readings from Last 5 Encounters:   11/29/18 71 kg (156 lb 8 oz)   11/27/18 73 kg (161 lb)   11/26/18 72.8 kg (160 lb 6.4 oz)   11/18/18 71.1 kg (156 lb 12.8 oz)   11/01/18 73.1 kg (161 lb 1 oz)       GEN: Awake, alert, short of breath during conversation despite 97% oxygen saturation  Pulm: Bi-basillar crackles, no wheeze, no cough, no rhonchi  Cardiac: JVP not visible on exam, no audible murmurs, paced rhythm  Vascular: No lower extremity edema and palpable radial and pedal pulses  GI: soft, non distended  Neuro: CN II-XII grossly intact    Medications:    apixaban ANTICOAGULANT  2.5 mg Oral BID     azaTHIOprine  50 mg Oral Daily     carvedilol  3.125 mg Oral BID w/meals     folic acid  1 mg Oral Daily     furosemide  80 mg Intravenous Once     hydrALAZINE  10 mg Oral TID     isosorbide dinitrate  10 mg Oral TID AC     levothyroxine  75 mcg Oral Daily     ranitidine  150 mg Oral BID     spironolactone  12.5 mg Oral Daily     trimethoprim  50 mg Oral Daily       - MEDICATION INSTRUCTIONS -         Labs:   CMP  Recent Labs  Lab 11/29/18  0651 11/28/18  1558    136   POTASSIUM 3.6 3.8   CHLORIDE 106 104   CO2 28 26   ANIONGAP 7 7   GLC 93 110*   BUN 38* 41*   CR 1.57* 1.69*   GFRESTIMATED 31* 29*   GFRESTBLACK 38* 35*   FATOUMATA 7.7* 7.8*   PROTTOTAL  --  7.5   ALBUMIN  --  3.5   BILITOTAL  --  0.4   ALKPHOS  --  65   AST  --  21   ALT  --  21     CBC  Recent Labs  Lab 11/29/18  0651 11/28/18  1558   WBC 6.7 6.2   RBC 3.71* 3.74*    HGB 12.2 12.4   HCT 37.8 38.3   * 102*   MCH 32.9 33.2*   MCHC 32.3 32.4   RDW 13.6 13.6    270     INRNo lab results found in last 7 days.  Arterial Blood GasNo lab results found in last 7 days.    Diagnostics:  ECG 11/28/2018:      CXR 11/28/2018:  Findings:   Frontal convexity of the chest. Left chest wall cardiac device with  intracardiac leads. No large pleural effusion or pneumothorax.  Slightly increased bibasilar opacities compared to chest x-ray dated  11/17/2018         Impression: Mildly increased bibasilar opacities which may represent  atelectasis versus infection versus progression of interstitial lung  disease.      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 She states she has been feeling poorly about a week ago she feels she is about to faint is short of breath at times at rest and with any minimal exertion she gets lightheaded and at times feels she is about to pass out. She states her flutter feels somewhat different since she has the pacer she cant tell when she is in an abnormal rhythm but she gets fatigued quicker, she was cardioverted on 10/16/18. She went to core clinic yesterday she has been having high blood pressures since 11/25 yesterday as high as 174/90 with pulse remaining in the 60's. Her weight has not changes much she may be up one pound. Today she remains short of breath, speaking in short sentences, and has bibasilar crackles. No audible murmur on exam despite moderate-severe TR on previous echo. Responding to diuretic, will continue to diurese. Had syncope/presycopal episode with PFT testing. Will interrogate device.    #HFpEF  - ADHF -> she presents with increased BNP and shortness of breath worsening for the past  week  - she has been in flutter frequently and presents in flutter she does go in and out of it per last pacer check, rates are controlled though she may be very symptomatic depending on her atrial kick. She has been seen by EP and was tried on amiodarone which was discontinue given she keeps going in and out of AF vs progression of MR to severe per last YOLANDA 10/16.   - not a candidate for Ablation given co morbidities per EP notes  - consider repeat DCCV  - on spironolactone 12.5 mg daily   - Repeat lasix IV 80mg. Has already gotten IV 40, and IV 80 and responded well this AM  - Echocardiogram today to assess mitral insufficiency     # AF  - on apixaban for anticoagulation  - with very frequent cardioversions 9/14/18,10/11/18, 10/16/18  - rate controlled   - CHADSVASC4 on eliquis   - per device check has had 4 episodes since 11/17 1min->6 days   - given use of amiodarone and parenchymal involvement of rheumatic disease will order PFT's to ensure etiology of shortness of breath is not worsening lung function  - Device interrogation today     #Severe MR  - could benefit from afterload reduction and better bp control  - will use hydral/isordil given NEIL and need for diuresis   - may consider switching to lisinopril for afterload reduction.    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 Nieves, who agrees with above plan.      Fredrick Will PA-C  [facility] Cardiology Team

## 2018-11-29 NOTE — ED NOTES
[facility], [place]   ED Nurse to Floor Handoff     Linda Spicer is a 87 year old female who speaks English and lives with family members,  in a home  They arrived in the ED by car from home    ED Chief Complaint: Generalized Weakness    ED Dx;   Final diagnoses:   Fatigue, unspecified type   Near syncope   Light headedness         Needed?: No    Allergies:   Allergies   Allergen Reactions     Cephalexin Hcl Diarrhea     Gabapentin Other (See Comments)     Dizzsiness     Naproxen GI Disturbance     Perfume      Macrobid [Nitrofurantoin Anhydrous]      Possibly related to lung disease      Seasonal Allergies      Sulfa Drugs      Throat swelling     Xanax [Alprazolam] Other (See Comments)     Dizziness      Ciprofloxacin Itching and Rash   .  Past Medical Hx:   Past Medical History:   Diagnosis Date     Adjustment disorder with anxious mood 5/18/2015     Advanced directives, counseling/discussion 8/30/2012    Patient states has Advance Directive and will bring in a copy to clinic. 8/30/2012   [facility] \       Anemia 9/25/2015     Basal cell cancer      CHF (congestive heart failure) (H) 9/18/2014     CKD (chronic kidney disease) stage 3, GFR 30-59 ml/min (H) 9/29/2015     DDD (degenerative disc disease), lumbar 9/25/2015     Diffuse idiopathic pulmonary fibrosis (H) 5/6/2013     Encounter for palliative care 5/18/2015     History of blood transfusion 9/29/2015     Hypertension goal BP (blood pressure) < 140/90 9/7/2012     Hypothyroid 9/7/2012     Irritable bowel syndrome 10/29/2013     Macular degeneration      Macular degeneration, 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     Nondisplaced spiral fracture of shaft of humerus      Osteoporosis 8/13/2013     Imo Update utility     RA (rheumatoid arthritis) (H) 5/7/2013     Rheumatic fever      Shingles      Spinal stenosis of lumbar region with neurogenic claudication 9/14/2015      Baseline Mental status:  "WDL  Current Mental Status changes: at basesline    Infection present or suspected this encounter: no  Sepsis suspected: No  Isolation type: No active isolations     Activity level - Baseline/Home:  independent with walker   Activity Level - Current:   Stand with Assist    Bariatric equipment needed?: No    In the ED these meds were given: Medications - No data to display    Drips running?  No    Home pump  No    Current LDAs  Peripheral IV 11/28/18 Left Upper forearm (Active)   Number of days:0       Incision/Surgical Site 10/13/15 Midline;Posterior Back (Active)   Number of days:1142       Labs results:   Labs Ordered and Resulted from Time of ED Arrival Up to the Time of Departure from the ED   CBC WITH PLATELETS DIFFERENTIAL - Abnormal; Notable for the following:        Result Value    RBC Count 3.74 (*)      (*)     MCH 33.2 (*)     All other components within normal limits   COMPREHENSIVE METABOLIC PANEL - Abnormal; Notable for the following:     Glucose 110 (*)     Urea Nitrogen 41 (*)     Creatinine 1.69 (*)     GFR Estimate 29 (*)     GFR Estimate If Black 35 (*)     Calcium 7.8 (*)     All other components within normal limits   ROUTINE UA WITH MICROSCOPIC - Abnormal; Notable for the following:     Leukocyte Esterase Urine Small (*)     All other components within normal limits   NT PROBNP INPATIENT - Abnormal; Notable for the following:     N-Terminal Pro BNP Inpatient 2473 (*)     All other components within normal limits   TSH   TROPONIN I   PERIPHERAL IV CATHETER   CARDIAC CONTINUOUS MONITORING   PULSE OXIMETRY NURSING       Imaging Studies:   Recent Results (from the past 24 hour(s))   XR Chest 2 Views    Impression    Impression:     Mildly increased bibasilar opacities which may represent atelectasis  versus infection versus progression of interstitial lung disease.       Recent vital signs:   /89  Pulse 65  Temp 98.1  F (36.7  C) (Oral)  Resp 18  Ht 1.6 m (5' 3\")  Wt 70.8 kg (156 " lb)  LMP  (LMP Unknown)  SpO2 99%  BMI 27.63 kg/m2    Cardiac Rhythm: Other V paced   Pt needs tele? No  Skin/wound Issues: None    Code Status: Full Code    Pain control: pt had none    Nausea control: pt had none    Abnormal labs/tests/findings requiring intervention: see epic    Family present during ED course? Yes   Family Comments/Social Situation comments: n/a    Tasks needing completion: None    [phone] [facility] ED

## 2018-11-29 NOTE — PHARMACY-ADMISSION MEDICATION HISTORY
Admission medication history interview status for the 11/28/2018 admission is complete. See Epic admission navigator for allergy information, pharmacy, prior to admission medications and immunization status.     Medication history interview sources:  Patient provided medication schedule    Changes made to PTA medication list (reason)  Added: Prolia, Triamcinolone 0.025% cream  Deleted: none  Changed: Senna/docusate (2 tabs po at bedtime --> 1 tab daily prn), ranitidine 150mg (1 po BID --> 1 qpm), Preservision (1 tab daily --> 1 tab BID), Genteal (clarified directions), Fish Oil (2 caps daily --> 1 cap BID), Oscal D (600mg +D; 1 tab BID --> 500mg + D; 1 tab qpm), artificial tears (clarified  usage), Acetaminophen (added strength; 1,000 mg BID prn --> 500-1,000 mg po q 6 h prn), Orencia (added dose)    Additional medication history information (including reliability of information, actions taken by pharmacist):  -Patient had a list/schedule for her medications that she gave me. Stated that she took all of her morning and afternoon medications for today but had not taken her evening medications.   -Stated she got her prolia injection a few days ago her sheet did not specify the frequency but looking at records it appears she gets it every 6 months.  -Does use the Estriol cream that is compounded for her. She stated that she did bring this with her and has not used it in a few days. Also stated that she does not have set days in which she uses the medication.   - Has two different products for dry eyes. She stated that she did bring these products with her. Also said that she uses one of them every night and the other is used as needed but could not remember which one. I asked if she could show me which one she used daily and which one she used as needed but was unable to find them in her bag and was not able to verify what she thought she did.       Prior to Admission medications    Medication Sig Last Dose Taking?  Auth Provider   abatacept (ORENCIA) 250 MG injection Inject 750 mg into the vein every 28 days Past Month at Unknown time Yes Unknown, Entered By History   acetaminophen (TYLENOL) 500 MG tablet Take 500-1,000 mg by mouth every 6 hours as needed for mild pain PRN at few months Yes Unknown, Entered By History   albuterol (2.5 MG/3ML) 0.083% neb solution Take 1 vial by nebulization every 6 hours as needed for shortness of breath / dyspnea or wheezing PRN at 6 months ago Yes Reported, Patient   apixaban ANTICOAGULANT (ELIQUIS) 2.5 MG tablet Take 1 tablet (2.5 mg) by mouth 2 times daily 11/28/2018 at am Yes Emeterio Loza MD   azaTHIOprine (IMURAN) 50 MG tablet Take 1 tablet (50 mg) by mouth daily 11/27/2018 at pm Yes Mario Davenport MD   calcium carbonate-vitamin D (OS-FATOUMATA) 500-400 MG-UNIT tablet Take 1 tablet by mouth daily 11/27/2018 at pm Yes Unknown, Entered By History   Carboxymethylcellulose Sod PF (CELLUVISC/REFRESH LIQUIGEL) 1 % ophthalmic gel Place 1 drop into both eyes daily as needed for dry eyes 11/27/2018 at pm Yes Unknown, Entered By History   carvedilol (COREG) 3.125 MG tablet Take 1 tablet (3.125 mg) by mouth 2 times daily (with meals) 11/28/2018 at am Yes Minna Gallagher APRN CNP   cetirizine (ZYRTEC ALLERGY) 10 MG tablet Take 10 mg by mouth At Bedtime 11/27/2018 at pm Yes Reported, Patient   COMPOUNDED NON-CONTROLLED SUBSTANCE (CMPD RX) - PHARMACY TO MIX COMPOUNDED MEDICATION Estriol 1mg/gram. Place 1 gram vaginally daily for two weeks, then vaginally twice weekly after. Past Week at few days ago Yes Asia Steven PA-C   denosumab (PROLIA) 60 MG/ML SOLN injection Inject 60 mg Subcutaneous every 6 months  Past Week at few days ago Yes Unknown, Entered By History   fish oil-omega-3 fatty acids (FISH OIL) 1000 MG capsule Take 1 g by mouth 2 times daily 2 capsules daily      11/28/2018 at am Yes Reported, Patient   folic acid (FOLVITE) 1 MG tablet Take 1 tablet (1 mg) by mouth daily  11/28/2018 at am Yes Tre Lockett MD   furosemide (LASIX) 20 MG tablet Take 40mg in the AM and 20mg in the PM 11/28/2018 at afternoon Yes Priscilla Agrawal PA-C   hydrocortisone 2.5 % cream Apply topically 2 times daily as needed prn at prn Yes Unknown, Entered By History   hypromellose (GENTEAL) 0.3 % SOLN ophthalmic solution Place 1 drop into both eyes daily as needed for dry eyes PRN at PRN Yes Unknown, Entered By History   levothyroxine (SYNTHROID/LEVOTHROID) 75 MCG tablet TAKE ONE TABLET BY MOUTH ONCE DAILY 11/28/2018 at am Yes Tre Lockett MD   Multiple Vitamins-Minerals (PRESERVISION AREDS PO) Take 1 tablet by mouth 2 times daily  11/28/2018 at am Yes Reported, Patient   ranibizumab (LUCENTIS) 0.3 MG/0.05ML SOLN 0.3 mg by Intravitreal route Every 6 weeks Past Month at Unknown time Yes Reported, Patient   ranitidine (ZANTAC) 150 MG tablet Take 150 mg by mouth At Bedtime 11/27/2018 at pm Yes Unknown, Entered By History   saccharomyces boulardii (FLORASTOR) 250 MG capsule Take 250 mg by mouth daily 11/28/2018 at afternoon Yes Reported, Patient   senna-docusate (SENOKOT-S/PERICOLACE) 8.6-50 MG tablet Take 1 tablet by mouth daily as needed for constipation PRN at PRN Yes Unknown, Entered By History   spironolactone (ALDACTONE) 25 MG tablet Take 0.5 tablets (12.5 mg) by mouth daily 11/28/2018 at am Yes Priscilla Agrawal PA-C   triamcinolone (KENALOG) 0.025 % cream Apply topically daily as needed for irritation prn at prn Yes Unknown, Entered By History   trimethoprim (TRIMPEX) 100 MG tablet Take 0.5 tablets (50 mg) by mouth daily 11/27/2018 at pm Yes Asia Steven PA-C   vitamin C (ASCORBIC ACID) 500 MG tablet Take 500 mg by mouth daily 11/27/2018 at pm Yes Unknown, Entered By History   Blood Pressure Monitor KIT 1 kit daily as needed Unknown at Unknown time  Dejuan Perez MD   nitroglycerin (NITROSTAT) 0.4 MG SL tablet Place 1 tablet (0.4 mg) under the tongue every  5 minutes as needed for chest pain PRN at PRN  Parag Park MD   order for DME Dispense SP Walker-small Unknown at Unknown time  Tre Lockett MD   order for DME Equipment being ordered: Dispense baffle, for use with nebulizer. Unknown at Unknown time  Tre Lockett MD   order for DME Equipment being ordered: Nebulizer. Use with Albuterol. Unknown at Unknown time  Tre Lockett MD   order for DME Equipment being ordered: Dispense face mask.  Mrs. Spicer is immunosuppressed due to rheumatoid arthritis. Unknown at Unknown time  Tre Lockett MD         Medication history completed by: Pedro Luis Snell, Pharmacy Intern

## 2018-11-29 NOTE — PROGRESS NOTES
Preliminary Device Interrogation Results.  Final physician signed paceart report to be scanned and attached.    Patient seen on 6C for evaluation and iterative programming of her Medtronic dual lead pacemaker per MD orders.   Normal pacemaker function.  1 AT/AF episode in progress since 11/20/18, with well-controlled ventricular response per histograms.  AF burden since 11/17/18 = 80.9%.  No ventricular arrhythmia detections have been recorded.   Intrinsic rhythm = AF @ 65-70 bpm.   AP = 18.3%.   = 68.9%.  Estimated battery longevity to ZENAIDA = 7 years./Battery voltage = 3.01V.  No programming changes were made.      Dual lead pacemaker

## 2018-11-29 NOTE — PLAN OF CARE
"Problem: Fall Risk (Adult)  Goal: Identify Related Risk Factors and Signs and Symptoms  Related risk factors and signs and symptoms are identified upon initiation of Human Response Clinical Practice Guideline (CPG).  D/admitted from the ER on litter, able to stand on the scale. Admitted for fatigue, near syncope and dizziness. History of recurrent AFib/flutter, tachybrady-pacer, pulmonary fibrosis, CKD, HTN, IBS, Rheumatoid arthritis, anxiety, EF 55-60%, mild pulm HTN, mild/mod TI/MR. She was hospitalized 10/20 for CHF and was CV 10-11. She was inpatient for syncope on 11/17. Today, she reports last 2 days of weak, \"feels lousy\", SOB, and had 40 Lasix in the ER. She has 2 + LE and rales in bases. She lives in independent part of senior center. But, the last 2 days she was too weak to cook. She tells me she follows heart healthy diet and cooking, and has some good recipes and especially likes to cook some Mexican foods, but not the rice. She fell twice in last few months-fall band in place, and usually uses a walker. She called her daughter to let her know she was admitted. (her son brought her here).   A/expresses frustration about readmission. Says she has been cardioverted in past and it does not last. She is frustrated with continuing weakness as she would like to be independent  P/monitor for changes       "

## 2018-11-30 ENCOUNTER — HOSPITAL ENCOUNTER (INPATIENT)
Facility: SKILLED NURSING FACILITY | Age: 83
End: 2018-11-30
Attending: HOSPITALIST | Admitting: HOSPITALIST
Payer: MEDICARE

## 2018-11-30 ENCOUNTER — APPOINTMENT (OUTPATIENT)
Dept: PHYSICAL THERAPY | Facility: CLINIC | Age: 83
DRG: 291 | End: 2018-11-30
Attending: INTERNAL MEDICINE
Payer: MEDICARE

## 2018-11-30 LAB
ANION GAP SERPL CALCULATED.3IONS-SCNC: 7 MMOL/L (ref 3–14)
BUN SERPL-MCNC: 42 MG/DL (ref 7–30)
CALCIUM SERPL-MCNC: 7.5 MG/DL (ref 8.5–10.1)
CHLORIDE SERPL-SCNC: 105 MMOL/L (ref 94–109)
CO2 SERPL-SCNC: 25 MMOL/L (ref 20–32)
CREAT SERPL-MCNC: 1.71 MG/DL (ref 0.52–1.04)
ERYTHROCYTE [DISTWIDTH] IN BLOOD BY AUTOMATED COUNT: 13.4 % (ref 10–15)
GFR SERPL CREATININE-BSD FRML MDRD: 28 ML/MIN/1.7M2
GLUCOSE SERPL-MCNC: 141 MG/DL (ref 70–99)
HCT VFR BLD AUTO: 35.3 % (ref 35–47)
HGB BLD-MCNC: 11.6 G/DL (ref 11.7–15.7)
MCH RBC QN AUTO: 33.1 PG (ref 26.5–33)
MCHC RBC AUTO-ENTMCNC: 32.9 G/DL (ref 31.5–36.5)
MCV RBC AUTO: 101 FL (ref 78–100)
PLATELET # BLD AUTO: 239 10E9/L (ref 150–450)
POTASSIUM SERPL-SCNC: 3.7 MMOL/L (ref 3.4–5.3)
RBC # BLD AUTO: 3.5 10E12/L (ref 3.8–5.2)
SODIUM SERPL-SCNC: 138 MMOL/L (ref 133–144)
WBC # BLD AUTO: 6.4 10E9/L (ref 4–11)

## 2018-11-30 PROCEDURE — 85027 COMPLETE CBC AUTOMATED: CPT | Performed by: PHYSICIAN ASSISTANT

## 2018-11-30 PROCEDURE — 40000193 ZZH STATISTIC PT WARD VISIT

## 2018-11-30 PROCEDURE — 21400006 ZZH R&B CCU INTERMEDIATE UMMC

## 2018-11-30 PROCEDURE — 36415 COLL VENOUS BLD VENIPUNCTURE: CPT | Performed by: PHYSICIAN ASSISTANT

## 2018-11-30 PROCEDURE — 25000132 ZZH RX MED GY IP 250 OP 250 PS 637: Mod: GY | Performed by: STUDENT IN AN ORGANIZED HEALTH CARE EDUCATION/TRAINING PROGRAM

## 2018-11-30 PROCEDURE — 97162 PT EVAL MOD COMPLEX 30 MIN: CPT | Mod: GP

## 2018-11-30 PROCEDURE — 97530 THERAPEUTIC ACTIVITIES: CPT | Mod: GP

## 2018-11-30 PROCEDURE — 93005 ELECTROCARDIOGRAM TRACING: CPT

## 2018-11-30 PROCEDURE — A9270 NON-COVERED ITEM OR SERVICE: HCPCS | Mod: GY | Performed by: STUDENT IN AN ORGANIZED HEALTH CARE EDUCATION/TRAINING PROGRAM

## 2018-11-30 PROCEDURE — 99232 SBSQ HOSP IP/OBS MODERATE 35: CPT | Performed by: INTERNAL MEDICINE

## 2018-11-30 PROCEDURE — 80048 BASIC METABOLIC PNL TOTAL CA: CPT | Performed by: PHYSICIAN ASSISTANT

## 2018-11-30 PROCEDURE — 25000131 ZZH RX MED GY IP 250 OP 636 PS 637: Mod: GY | Performed by: STUDENT IN AN ORGANIZED HEALTH CARE EDUCATION/TRAINING PROGRAM

## 2018-11-30 PROCEDURE — 93010 ELECTROCARDIOGRAM REPORT: CPT | Performed by: INTERNAL MEDICINE

## 2018-11-30 PROCEDURE — 97110 THERAPEUTIC EXERCISES: CPT | Mod: GP

## 2018-11-30 RX ADMIN — HYDRALAZINE HYDROCHLORIDE 10 MG: 10 TABLET, FILM COATED ORAL at 13:46

## 2018-11-30 RX ADMIN — CARVEDILOL 3.12 MG: 3.12 TABLET, FILM COATED ORAL at 09:11

## 2018-11-30 RX ADMIN — FOLIC ACID 1 MG: 1 TABLET ORAL at 09:11

## 2018-11-30 RX ADMIN — LEVOTHYROXINE SODIUM 75 MCG: 75 TABLET ORAL at 09:11

## 2018-11-30 RX ADMIN — RANITIDINE 150 MG: 150 TABLET ORAL at 09:11

## 2018-11-30 RX ADMIN — RANITIDINE 150 MG: 150 TABLET ORAL at 20:54

## 2018-11-30 RX ADMIN — HYDRALAZINE HYDROCHLORIDE 10 MG: 10 TABLET, FILM COATED ORAL at 09:11

## 2018-11-30 RX ADMIN — ISOSORBIDE DINITRATE 10 MG: 10 TABLET ORAL at 09:11

## 2018-11-30 RX ADMIN — APIXABAN 2.5 MG: 2.5 TABLET, FILM COATED ORAL at 20:54

## 2018-11-30 RX ADMIN — Medication 50 MG: at 20:55

## 2018-11-30 RX ADMIN — AZATHIOPRINE 50 MG: 50 TABLET ORAL at 20:54

## 2018-11-30 RX ADMIN — APIXABAN 2.5 MG: 2.5 TABLET, FILM COATED ORAL at 09:11

## 2018-11-30 RX ADMIN — ISOSORBIDE DINITRATE 10 MG: 10 TABLET ORAL at 13:45

## 2018-11-30 RX ADMIN — HYDRALAZINE HYDROCHLORIDE 10 MG: 10 TABLET, FILM COATED ORAL at 20:54

## 2018-11-30 RX ADMIN — CARVEDILOL 3.12 MG: 3.12 TABLET, FILM COATED ORAL at 18:09

## 2018-11-30 RX ADMIN — ISOSORBIDE DINITRATE 10 MG: 10 TABLET ORAL at 16:52

## 2018-11-30 RX ADMIN — Medication 12.5 MG: at 09:11

## 2018-11-30 ASSESSMENT — ACTIVITIES OF DAILY LIVING (ADL)
ADLS_ACUITY_SCORE: 18
ADLS_ACUITY_SCORE: 18
ADLS_ACUITY_SCORE: 12
ADLS_ACUITY_SCORE: 18
ADLS_ACUITY_SCORE: 12
ADLS_ACUITY_SCORE: 18

## 2018-11-30 NOTE — PLAN OF CARE
"Problem: Patient Care Overview  Goal: Plan of Care/Patient Progress Review  Outcome: No Change  /63 (BP Location: Left arm)  Pulse 65  Temp 98  F (36.7  C) (Oral)  Resp 18  Ht 1.613 m (5' 3.5\")  Wt 70.5 kg (155 lb 8 oz)  LMP  (LMP Unknown)  SpO2 96%  BMI 27.11 kg/m2    Alert and oriented x4. VSS on room air. V paced with underlying afib/aflutter. Denies pain. On 2 gram Na diet. Good appetite. No BM. Oliguric. Up with assist of 2 with a gait belt and walker. No episodes of vertigo/dizziness overnight. Pt slept between cares. Plan for PFTs today. Will continue to monitor and notify MDs accordingly.      "

## 2018-11-30 NOTE — PLAN OF CARE
Problem: Patient Care Overview  Goal: Plan of Care/Patient Progress Review  Discharge Planner PT   Patient plan for discharge: agreeable to TCU  Current status: patient ambulates to/from bathroom ~25 feet with FWW and CGA x1, no complaints of dizziness during ambulation however not able to walk further distances d/t fatigue. Engaged in standing exercises. Patient fatigues quickly and feels generally weaker than usual, afraid to return home alone to ILF.  Barriers to return to prior living situation: level of assist, medical stability  Recommendations for discharge: TCU  Rationale for recommendations: pt would benefit from TCU to progress activity tolerance and strength for increased indep with functional mobility       Entered by: Valery Collins 11/30/2018 9:38 AM

## 2018-11-30 NOTE — PROGRESS NOTES
SPIRITUAL HEALTH SERVICES  SPIRITUAL ASSESSMENT Progress Note  [facility]C     REFERRAL SOURCE: Initial unit  visit with pt and family, per request for hospital  visit as noted in initial nursing assessment.    No particular needs expressed today - pt is Christian, appreciates receiving communion. Pt said she hopes to be discharged soon.    PLAN: Further assess needs if pt stays beyond weekend.    Fernando Liz) Rajeev Nunn M.Div., [facility]  Staff   Pager [phone]

## 2018-11-30 NOTE — CONSULTS
Neurology Consultation    Linda Spicer MRN:   [id number]   87 year old       Reason for consult: Neuro team was consulted by the primary team to evaluate Linda for a possible neurological etiology to her worsening fatigue and shortness of breath that seemed to be worse in upright position than lying flat.          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 She reports that she has had increased shortness of breath with minimal exertion including talking, and has had several episodes of lightheadedness that have resulted in a few falls. She has had multiple CT scans and MRI during workup at [facility] for similar symptoms of lightheadedness and falls, all with no definitive findings. Recently, the shortness of breath has worsened so that she is speaking in short sentences and has limited her ability to enjoy hobbies. She describes the lightheadedness as well as vision closing in, feeling like she needs to lie down or will faint. She denies nausea, diaphoresis during this episode and does not notice if her heart is beating faster than usual. Prior to this admission, she was using a walker to get around, but has become limited in the distance she can ambulate do to her SOB and lightheadedness. When asked about whether she feels her SOB improves with lying flat, she states that she thinks that may only be because she doesn't talk while lying down. She has not noticed any muscle weakness, numbness, or tingling. No headaches or vision changes. No recent head trauma. She has had no significant weight change or change in appetite.    She lives alone in a senior independent living apartment since her 's passing in September. She says she is doing well with her  grief with the support of her family and friends. She is most concerned about her increased feelings of lightheadedness for fear of falling down while she is alone. She does has an alert bracelet and has discussed with family about moving to an assisted living facility.     She has had falls and been evaluated for intracerebral hemorrhage in the past.     She has long standing dizziness that seems to have an orthostatic nature. Sometimes gets worse with medication changes.                Past Medical History:     Past Medical History:   Diagnosis Date     Adjustment disorder with anxious mood 5/18/2015     Advanced directives, counseling/discussion 8/30/2012    Patient states has Advance Directive and will bring in a copy to clinic. 8/30/2012   [facility] Medical Assistant \       Anemia 9/25/2015     Basal cell cancer      CHF (congestive heart failure) (H) 9/18/2014     CKD (chronic kidney disease) stage 3, GFR 30-59 ml/min (H) 9/29/2015     DDD (degenerative disc disease), lumbar 9/25/2015     Diffuse idiopathic pulmonary fibrosis (H) 5/6/2013     Encounter for palliative care 5/18/2015     History of blood transfusion 9/29/2015     Hypertension goal BP (blood pressure) < 140/90 9/7/2012     Hypothyroid 9/7/2012     Irritable bowel syndrome 10/29/2013     Macular degeneration      Macular degeneration, 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     Nondisplaced spiral fracture of shaft of humerus      Osteoporosis 8/13/2013     [employer] Update utility     RA (rheumatoid arthritis) (H) 5/7/2013     Rheumatic fever      Shingles      Spinal stenosis of lumbar region with neurogenic claudication 9/14/2015              Past Surgical History:     Past Surgical History:   Procedure Laterality Date     ANESTHESIA CARDIOVERSION N/A 9/14/2018    Procedure: ANESTHESIA CARDIOVERSION;;  Surgeon: GENERIC ANESTHESIA PROVIDER;  Location: [facility] OR     ANESTHESIA CARDIOVERSION N/A 10/11/2018    Procedure: ANESTHESIA CARDIOVERSION;  Cardioversion;   Surgeon: GENERIC ANESTHESIA PROVIDER;  Location: [facility] OR     ANESTHESIA CARDIOVERSION N/A 10/16/2018    Procedure: Anesthesia Cardioversion ;  Surgeon: GENERIC ANESTHESIA PROVIDER;  Location: [facility] OR     APPENDECTOMY       BIOPSY      hemorrhoidectomy     ENT SURGERY      tonsillectomy     GYN SURGERY      3 D & C's     HYSTERECTOMY, PAP NO LONGER INDICATED       LAMINECTOMY LUMBAR ONE LEVEL N/A 10/13/2015    Procedure: LAMINECTOMY LUMBAR ONE LEVEL;  Surgeon: Fransico Toussaint MD;  Location: [facility] OR              Social History:     Social History   Substance Use Topics     Smoking status: Never Smoker     Smokeless tobacco: Never Used     Alcohol use Yes      Comment: rare wine               Family History:     Family History   Problem Relation Age of Onset     Hypertension Mother      Psychotic Disorder Father      Diabetes Son      Diabetes Daughter      Blood Disease Daughter               Allergies:     Allergies   Allergen Reactions     Cephalexin Hcl Diarrhea     Gabapentin Other (See Comments)     Dizzsiness     Naproxen GI Disturbance     Perfume      Macrobid [Nitrofurantoin Anhydrous]      Possibly related to lung disease      Seasonal Allergies      Sulfa Drugs      Throat swelling     Xanax [Alprazolam] Other (See Comments)     Dizziness      Ciprofloxacin Itching and Rash             Medications:     Prescriptions Prior to Admission   Medication Sig Dispense Refill Last Dose     abatacept (ORENCIA) 250 MG injection Inject 750 mg into the vein every 28 days   11/26/2018 at Unknown time     acetaminophen (TYLENOL) 500 MG tablet Take 500-1,000 mg by mouth every 6 hours as needed for mild pain   PRN at few months     albuterol (2.5 MG/3ML) 0.083% neb solution Take 1 vial by nebulization every 6 hours as needed for shortness of breath / dyspnea or wheezing   PRN at 6 months ago     apixaban ANTICOAGULANT (ELIQUIS) 2.5 MG tablet Take 1 tablet (2.5 mg) by mouth 2 times daily 180 tablet 3 11/28/2018 at am      azaTHIOprine (IMURAN) 50 MG tablet Take 1 tablet (50 mg) by mouth daily 90 tablet 2 11/27/2018 at pm     calcium carbonate-vitamin D (OS-FATOUMATA) 500-400 MG-UNIT tablet Take 1 tablet by mouth daily   11/27/2018 at pm     Carboxymethylcellulose Sod PF (CELLUVISC/REFRESH LIQUIGEL) 1 % ophthalmic gel Place 1 drop into both eyes daily as needed for dry eyes   11/27/2018 at pm     carvedilol (COREG) 3.125 MG tablet Take 1 tablet (3.125 mg) by mouth 2 times daily (with meals) 60 tablet 11 11/28/2018 at am     cetirizine (ZYRTEC ALLERGY) 10 MG tablet Take 10 mg by mouth At Bedtime   11/27/2018 at pm     COMPOUNDED NON-CONTROLLED SUBSTANCE (CMPD RX) - PHARMACY TO MIX COMPOUNDED MEDICATION Estriol 1mg/gram. Place 1 gram vaginally daily for two weeks, then vaginally twice weekly after. 30 g 6 Past Week at few days ago     denosumab (PROLIA) 60 MG/ML SOLN injection Inject 60 mg Subcutaneous every 6 months    11/26/2018 at few days ago     fish oil-omega-3 fatty acids (FISH OIL) 1000 MG capsule Take 1 g by mouth 2 times daily    11/28/2018 at am     folic acid (FOLVITE) 1 MG tablet Take 1 tablet (1 mg) by mouth daily 90 tablet 3 11/28/2018 at am     furosemide (LASIX) 20 MG tablet Take 40mg in the AM and 20mg in the  tablet 3 11/28/2018 at afternoon     hydrocortisone 2.5 % cream Apply topically 2 times daily as needed   prn at prn     hypromellose (GENTEAL) 0.3 % SOLN ophthalmic solution Place 1 drop into both eyes daily as needed for dry eyes   PRN at PRN     levothyroxine (SYNTHROID/LEVOTHROID) 75 MCG tablet TAKE ONE TABLET BY MOUTH ONCE DAILY 90 tablet 1 11/28/2018 at am     Multiple Vitamins-Minerals (PRESERVISION AREDS PO) Take 1 tablet by mouth 2 times daily    11/28/2018 at am     ranitidine (ZANTAC) 150 MG tablet Take 150 mg by mouth At Bedtime   11/27/2018 at pm     saccharomyces boulardii (FLORASTOR) 250 MG capsule Take 250 mg by mouth daily   11/28/2018 at afternoon     senna-docusate (SENOKOT-S/PERICOLACE) 8.6-47  "MG tablet Take 1 tablet by mouth daily as needed for constipation   PRN at PRN     spironolactone (ALDACTONE) 25 MG tablet Take 0.5 tablets (12.5 mg) by mouth daily 30 tablet 1 11/28/2018 at am     triamcinolone (KENALOG) 0.025 % cream Apply topically daily as needed for irritation   prn at prn     trimethoprim (TRIMPEX) 100 MG tablet Take 0.5 tablets (50 mg) by mouth daily 45 tablet 1 11/27/2018 at pm     vitamin C (ASCORBIC ACID) 500 MG tablet Take 500 mg by mouth daily   11/27/2018 at pm     Blood Pressure Monitor KIT 1 kit daily as needed 1 kit 0 Unknown at Unknown time     nitroglycerin (NITROSTAT) 0.4 MG SL tablet Place 1 tablet (0.4 mg) under the tongue every 5 minutes as needed for chest pain 25 tablet 0 PRN at PRN     order for DME Dispense SP Walker-small 1 each 0 Unknown at Unknown time     order for DME Equipment being ordered: Dispense baffle, for use with nebulizer. 1 each 0 Unknown at Unknown time     order for DME Equipment being ordered: Nebulizer. Use with Albuterol. 1 each 0 Unknown at Unknown time     order for DME Equipment being ordered: Dispense face mask.  Mrs. Spicer is immunosuppressed due to rheumatoid arthritis. 1 [address] Unknown at Unknown time     ranibizumab (LUCENTIS) 0.3 MG/0.05ML SOLN 0.3 mg by Intravitreal route Every 6 weeks   Unknown at Unknown time              Review of Systems:   The 10 point Review of Systems is negative other than noted in the HPI          Physical Exam:   Vital signs:  Temp: 97.6  F (36.4  C) Temp src: Oral BP: 123/65   Heart Rate: 54 Resp: 16 SpO2: 97 % O2 Device: None (Room air)   Height: 161.3 cm (5' 3.5\") Weight: 70.5 kg (155 lb 8 oz)  Estimated body mass index is 27.11 kg/(m^2) as calculated from the following:    Height as of this encounter: 1.613 m (5' 3.5\").    Weight as of this encounter: 70.5 kg (155 lb 8 oz).        Constitutional : pleasantly conversive older woman, mildly fatigued  Head: atraumatic  Eyes: anicteric  CV: S1/S2, no murmurs  Lung: " On room air. Crackles at lung bases bilaterally. Dyspnea during speech both upright and lying flat.  Abdomen: soft, non tender, obese  Extremities: Distal pulses intact. Mild ankle edema.  Psychiatry: mildly anxious, affect congruent, cooperative    Neuro exam:  Mental Status: Alert and oriented to place, date, self and reasons of hospitalization.  Follow commands. Excellent recall of recent and more long term events. No aphasia or dysarthria. While she spoke she was witnessed to be taking frequent breaths every 4 words or so initially after she walked form bathroom and got back in bed. It then calmed down and she could say 8-10 words before taking a breath. At this time she was propped up to a 45 degree angle. She was then place din a supine position and she did seem to start taking more frequent breaths while talking but did not personally notice. Certainly no improvement.  Cranial nerves: PERRL, EOM intact, no nystagmus. Facial movement symmetric. Hearing intact to conversation.  No dysarthria. Palate elevation symmetric. Shoulder shrug strong.Tongue midline. No nystagmus.  Motor Survey: Normal muscle bulk and tone. No tremors. Neck flexion strength 4/5. Strength preserved on arms and legs, proximally and distally. Exercise of deltoids did not elicit weakness  Reflexes: Reflexes in the upper and lower extremities are normal and bilaterally symmetric. Plantar response is flexor bilaterally.  Coordination: FNF normal. No dysmetria (arms and legs tested)  Sensory: Light touch and vibratory preserved upper and lower extremities.  Gait: somewhat wide-based gait, using a walker and stable.           Data:     Recent Results (from the past 24 hour(s))   EKG 12-lead, tracing only    Collection Time: 11/30/18  9:44 AM   Result Value Ref Range    Interpretation ECG Click View Image link to view waveform and result    CBC with platelets    Collection Time: 11/30/18 10:22 AM   Result Value Ref Range    WBC 6.4 4.0 - 11.0  10e9/L    RBC Count 3.50 (L) 3.8 - 5.2 10e12/L    Hemoglobin 11.6 (L) 11.7 - 15.7 g/dL    Hematocrit 35.3 35.0 - 47.0 %     (H) 78 - 100 fl    MCH 33.1 (H) 26.5 - 33.0 pg    MCHC 32.9 31.5 - 36.5 g/dL    RDW 13.4 10.0 - 15.0 %    Platelet Count 239 150 - 450 10e9/L   Basic metabolic panel    Collection Time: 11/30/18 10:22 AM   Result Value Ref Range    Sodium 138 133 - 144 mmol/L    Potassium 3.7 3.4 - 5.3 mmol/L    Chloride 105 94 - 109 mmol/L    Carbon Dioxide 25 20 - 32 mmol/L    Anion Gap 7 3 - 14 mmol/L    Glucose 141 (H) 70 - 99 mg/dL    Urea Nitrogen 42 (H) 7 - 30 mg/dL    Creatinine 1.71 (H) 0.52 - 1.04 mg/dL    GFR Estimate 28 (L) >60 mL/min/1.7m2    GFR Estimate If Black 34 (L) >60 mL/min/1.7m2    Calcium 7.5 (L) 8.5 - 10.1 mg/dL              Assessment and Plan:   Linda Spicer is a 87-year-old woman with PMH of HFpEF, AFib/AFlutter s/p multiple cardioversions currently anticoagulated on apixaban, 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    1. Shortness of breath  Linda has had occasional periods of increased shortness of breath over the years contributed by rheumatoid pulmonary fibrosis and heart failure. She has had recent episodes of worsened shortness of breath with minimal exertion and lightheadedness that have resulted in syncope or near-syncope and falls. On exam, she has fluctuating dyspnea that results in 4-word to much longer sentences. Her dyspnea was equivalent if not a little worse when lying flat and continuing conversation certainly was not improved. There may be a psychiatric or emotional component to her worsened symptoms due to the recent passing of her  contributing to anxiety and evolution of symptoms it almost seemed in the exam that with distraction her sentences became longer and her breathing calmed down. Her symptoms are most likely pulmonary in nature. Less likely is an  "underlying neuromuscular weakness that must be ruled out.    - Recommend obtaining PFTs to evaluate for possible neuromuscular etiology vs pulmonary pattern  - Recommend obtaining serum Myasthenia Gravis panel (acetylcholine binding, blocking and modulating antibodies)  to rule out this NM etiology  -EMG could be performed as outpatient if neuromuscular pattern of weakness seen on PFTs. Not ideal to obtain in the hospital as quality is low making repetitive nerve stimulation highly unreliable. Also EMG not available over the weekend.    2. Dizziness/Lightheadedness  On review of Care Everywhere she has had numerous work ups for \"dizziness\" over the past two years including 4 Head CTs and an MRI brain. Some episodes have led to falls and trauma but no sign of hemorrhage. No need for more imaging. Yesterday she was witnessed to have a syncopal event. My assumption this is a combination of pulmonary and cardiac factors. Anxiety may play a role. Orthostatic blood pressure should be checked. I understand the cardiology team is trying to avoid fluid overload. Consideration fo compression stockings may help if she is orthostatic.     Recommendation-check orthostatic vitals signs, use compression stockings if present.           Attestation:  I, Elvia Burks, have seen and examined the patient and confirmed all findings. I agree with the findings in this note and have edited for detail and accuracy.    Overall I have low suspicion for neurological etiology for the patient's shortness of breath. Pulmonary function tests will help clarify type of breathing difficulties. She was witnessed to actually get worse with exertion and to my observation be worse laying flat while talking rather than better as initially reported by primary team. In that setting I can't completely rule out a neuromuscular junction disorder and I am recommending myasthenia gravis antibodies be drawn.    Thank you for the consultation.    Elvia SESAY" MD Kimmie FAAN  Department of Neurology  Pager [phone]

## 2018-11-30 NOTE — PROGRESS NOTES
[facility]   Cardiology Consults  Progress Note     Interval History:  - No significant improvement with diuresis. Creatinine bumped to 1.79. Possibly dry  - Device interrogation normal function, echo demonstrating mild-moderate mitral insuff    Physical Exam:  Temp:  [98  F (36.7  C)-98.3  F (36.8  C)] 98.2  F (36.8  C)  Heart Rate:  [59-82] 69  Resp:  [18-20] 18  BP: (109-135)/(61-85) 135/85  SpO2:  [95 %-97 %] 96 %    I/O:    Wt:   Wt Readings from Last 5 Encounters:   11/30/18 70.5 kg (155 lb 8 oz)   11/27/18 73 kg (161 lb)   11/26/18 72.8 kg (160 lb 6.4 oz)   11/18/18 71.1 kg (156 lb 12.8 oz)   11/01/18 73.1 kg (161 lb 1 oz)       GEN: Awake, alert, appears less short of breath today  Pulm: Bi-basillar crackles, no wheeze, no cough, no rhonchi  Cardiac: JVP not visible on exam, no audible murmurs, paced rhythm  Vascular: No lower extremity edema and palpable radial and pedal pulses  GI: soft, non distended  Neuro: CN II-XII grossly intact    Medications:    apixaban ANTICOAGULANT  2.5 mg Oral BID     azaTHIOprine  50 mg Oral Daily     carvedilol  3.125 mg Oral BID w/meals     folic acid  1 mg Oral Daily     hydrALAZINE  10 mg Oral TID     isosorbide dinitrate  10 mg Oral TID AC     levothyroxine  75 mcg Oral Daily     ranitidine  150 mg Oral BID     spironolactone  12.5 mg Oral Daily     trimethoprim  50 mg Oral Daily       - MEDICATION INSTRUCTIONS -         Labs:   CMP    Recent Labs  Lab 11/30/18  1022 11/29/18  1646 11/29/18  0651 11/28/18  1558    137 141 136   POTASSIUM 3.7 4.0 3.6 3.8   CHLORIDE 105 104 106 104   CO2 25 26 28 26   ANIONGAP 7 7 7 7   * 106* 93 110*   BUN 42* 47* 38* 41*   CR 1.71* 1.79* 1.57* 1.69*   GFRESTIMATED 28* 27* 31* 29*   GFRESTBLACK 34* 32* 38* 35*   FATOUMATA 7.5* 7.5* 7.7* 7.8*   PROTTOTAL  --   --   --  7.5   ALBUMIN  --   --   --  3.5   BILITOTAL  --   --   --  0.4   ALKPHOS  --   --   --  65   AST  --   --   --  21   ALT  --   --   --   21     CBC    Recent Labs  Lab 11/30/18  1022 11/29/18  0651 11/28/18  1558   WBC 6.4 6.7 6.2   RBC 3.50* 3.71* 3.74*   HGB 11.6* 12.2 12.4   HCT 35.3 37.8 38.3   * 102* 102*   MCH 33.1* 32.9 33.2*   MCHC 32.9 32.3 32.4   RDW 13.4 13.6 13.6    249 270     INRNo lab results found in last 7 days.  Arterial Blood GasNo lab results found in last 7 days.    Diagnostics:  ECG 11/28/2018:      CXR 11/28/2018:  Findings:   Frontal convexity of the chest. Left chest wall cardiac device with  intracardiac leads. No large pleural effusion or pneumothorax.  Slightly increased bibasilar opacities compared to chest x-ray dated  11/17/2018         Impression: Mildly increased bibasilar opacities which may represent  atelectasis versus infection versus progression of interstitial lung  disease.      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 She states she has been feeling poorly about a week ago she feels she is about to faint is short of breath at times at rest and with any minimal exertion she gets lightheaded and at times feels she is about to pass out. She states her flutter feels somewhat different since she has the pacer she cant tell when she is in an abnormal rhythm but she gets fatigued quicker, she was cardioverted on 10/16/18. She went to core clinic yesterday she has been having high blood pressures since 11/25 as high as 174/90 with pulse remaining in the 60's. Her weight has not changed much - may be up one pound. Today she appears less short of breath, but subjectively remains symptomatic and has bibasilar crackles. No audible murmur on exam. Repeat echo with mild-moderate mitral insufficiency. Device interrogation showing normal function, 80% AF burden.  Creatinine bumped, will hold further diuresis.     #Shortness of breath  #HFpEF  - ADHF -> she presents with increased BNP and shortness of breath worsening for the past week  - she has been in flutter frequently and presents in flutter she does go in and out of it per last pacer check, rates are controlled though she may be very symptomatic depending on her atrial kick. She has been seen by EP and was tried on amiodarone which was discontinue given she keeps going in and out of AF. Unlikely to be related to mitral insufficiency given current reading of mild to moderate MI. Continues to have oxygen saturation of high 90s.  - not a candidate for Ablation given co morbidities per EP notes  - consider repeat DCCV  - on spironolactone 12.5 mg daily   - Hold lasix  - Retry PFTs today  - Pulmonology consulted given history of rheumatic pulmonary fibrosis, appreciate recs  - Neurology consulted to assess for possible neurological sources of her symptoms    # AF  - on apixaban for anticoagulation  - with very frequent cardioversions 9/14/18,10/11/18, 10/16/18  - rate controlled   - CHADSVASC4 on eliquis   - per device check has had 4 episodes since 11/17 1min->6 days   - given use of amiodarone and parenchymal involvement of rheumatic disease try to get PFT's to ensure etiology of shortness of breath is not worsening lung function     #Severe MR  - could benefit from afterload reduction and better bp control - hydral/isordil given NEIL    - may consider switching to lisinopril for afterload reduction.    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 Nieves, who agrees with above plan.      Fredrick Will PA-C  [facility] Cardiology Team

## 2018-11-30 NOTE — PROGRESS NOTES
"Social Work Services Progress Note     Hospital Day: 2  Date of Initial Social Work Evaluation:  Not yet assessed  Collaborated with:  Pt, Arjun (son) via phone, Toya (daughter), Cards 1 Team     Data: Pt is a 87 year old female being followed by SW for possible discharge to TCU.       Intervention:  SW met with pt and family to discuss discharge placement.  Pt's son was interested in gaining reports about the Medicare star reviews of the facilities that are closer to pt's home.  SW provided education on how to navigate the Medicare Nursing Home Compare website.  Family will make choices on placement and notify SW either this afternoon or tomorrow morning.     - Referrals in Process:    Addendum: Son called to start referrals to the following facilities:   [facility]  PH: [phone]  F: [phone]    [facility]  PH: [phone]  F: [phone]    [facility]  [phone]     Assessment: Pt in a positive mood and family advocating for placement in a \"good\" facility.     Plan:    Anticipated Disposition: Facility:  TCU    Barriers to d/c plan: Medical stability, need referral options from family    Follow Up: SW to follow for discharge planning.     DENVER Bass, APSW  [address]   Phone: [phone]  Pager: [phone]  Unit: [phone]      ___________________________________________________________________________________________________________________________________________________    Referrals Discontinued:  [facility]- Pt and family no longer interested.  PH: [phone]  F: [phone]      Community Case Management/Community Services in place:   None.          "

## 2018-11-30 NOTE — PLAN OF CARE
Problem: Patient Care Overview  Goal: Plan of Care/Patient Progress Review  Outcome: Declining  D/A/I:  Patient A&O x4, denied pain, palpitations, and nausea.  Reported dizziness and lightheadedness that increased with position changes, but returned to baseline after a few minutes at rest/still.  Also reported RUSSELL and SOB, crackles auscultated in lung bases.  Had 1-2+ edema in arms and legs, was given schedule furosemide.  Patient had good urine output, see I&O flowsheet.  In paced rhythm with underlying atrial fibrillation and BBB, HR 60s-80s, SBP 110s-140s, SaO2 95-97% on room air.  Had witnessed and assisted fall during shift, see Provider Notification note.  Echocardiogram and device interrogation performed during shift, see Chart Review for results.  Ambulated to bathroom with 1-2 person assist and use of walker.  Son and daughter-in-law visited during shift.    P:  Continue to monitor pain, VS, heart rhythm, fluid status, cardiac and respiratory status.  Notify care team of changes in patient condition or other concerns.  Re-attempt PFT in AM.  Provide safety for patient during position changes and transfers/ambulation.

## 2018-11-30 NOTE — PROGRESS NOTES
11/30/18 1236   Living Environment   Lives With alone   Living Arrangements independent living facility   Home Accessibility no concerns   Number of Stairs to Enter Home 0   Number of Stairs Within Home 0   Stair Railings at Home none   Living Environment Comment pt lives alone in ILF, receives assist for meal prep from family   Self-Care   Dominant Hand right   Usual Activity Tolerance moderate   Current Activity Tolerance poor   Regular Exercise no   Equipment Currently Used at Home raised toilet;shower chair;walker, rolling;wheelchair, power   Activity/Exercise/Self-Care Comment indep with ADLs; uses FWW in apartment, 4WW in community. Has A for cleaning   Functional Level Prior   Ambulation 1-->assistive equipment   Transferring 0-->independent   Toileting 0-->independent   Bathing 0-->independent   Dressing 0-->independent   Eating 0-->independent   Communication 0-->understands/communicates without difficulty   Swallowing 0-->swallows foods/liquids without difficulty   Cognition 0 - no cognition issues reported   Fall history within last six months yes   Number of times patient has fallen within last six months 3   Which of the above functional risks had a recent onset or change? 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   Precautions/Limitations fall precautions   General Info Comments  "activity: amb with assist   Cognitive Status Examination   Orientation orientation to person, place and time   Level of Consciousness alert   Follows Commands and Answers Questions 100% of the time;able to follow multistep instructions   Personal Safety and Judgment intact   Memory intact   Pain Assessment   Patient Currently in Pain No   Integumentary/Edema   Integumentary/Edema no deficits were identifed   Posture    Posture Kyphosis   Range of Motion (ROM)   ROM Quick Adds No deficits were identified   Bed Mobility   Bed Mobility Comments not assessed   Transfer Skills   Transfer Comments sit <> stand with CGA and B UE support   Gait   Gait Comments ambulates 25 feet with FWW and CGA   Balance   Balance Comments static and dynamic balance fair   Sensory Examination   Sensory Perception no deficits were identified   Coordination   Coordination no deficits were identified   Muscle Tone   Muscle Tone no deficits were identified   General Therapy Interventions   Planned Therapy Interventions balance training;bed mobility training;gait training;neuromuscular re-education;strengthening;transfer training;risk factor education;home program guidelines;progressive activity/exercise   Clinical Impression   Criteria for Skilled Therapeutic Intervention yes, treatment indicated   PT Diagnosis impaired functional mobility   Influenced by the following impairments weakness, decreased activity tolerance, impaired balance, dizziness   Functional limitations due to impairments decreased indep with functional mobility   Clinical Presentation Evolving/Changing   Clinical Decision Making (Complexity) Moderate complexity   Therapy Frequency` 5 times/week   Predicted Duration of Therapy Intervention (days/wks) 1 wk   Anticipated Discharge Disposition Transitional Care Facility   Risk & Benefits of therapy have been explained Yes   Patient, Family & other staff in agreement with plan of care Yes   [facility] AM-PAC TM \"6 Clicks\" " "  2016, Trustees of [facility], under license to [employer].  All rights reserved.   6 Clicks Short Forms Basic Mobility Inpatient Short Form   [facility] AM-PAC  \"6 Clicks\" V.2 Basic Mobility Inpatient Short Form   1. Turning from your back to your side while in a flat bed without using bedrails? 4 - None   2. Moving from lying on your back to sitting on the side of a flat bed without using bedrails? 3 - A Little   3. Moving to and from a bed to a chair (including a wheelchair)? 3 - A Little   4. Standing up from a chair using your arms (e.g., wheelchair, or bedside chair)? 3 - A Little   5. To walk in hospital room? 3 - A Little   6. Climbing 3-5 steps with a railing? 2 - A Lot   Basic Mobility Raw Score (Score out of 24.Lower scores equate to lower levels of function) 18   Total Evaluation Time   Total Evaluation Time (Minutes) 8     "

## 2018-11-30 NOTE — PLAN OF CARE
Problem: Patient Care Overview  Goal: Plan of Care/Patient Progress Review  OT/6C: Evaluation orders received. Per discussion with PT, OT to hold at this time as pt is independent with basic ADLs. Will initiate as appropriate.

## 2018-11-30 NOTE — PROVIDER NOTIFICATION
Issue:  Patient had witnessed and assisted fall.    Assessment:  Patient was returned from PFT lab prior to testing as she had felt dizzy.  Patient was wheeled to the bedside in a wheelchair and assisted to stand by RN and PFT technician.  Patient became weak and unresponsive while standing and put her full weight on the support of the RN and technician, who lowered her to the floor.  Once patient was on the floor, she regained responsiveness and indicated that a similar event had happened at home.  Patient was oriented x4, but did not recall how she got to the floor.  VS checked from this position, /75, HR 65 with rhythm unchanged from prior to event, SaO2 96% on room air.  Patient was then sat up on the floor, /80, HR 62 and rhythm unchanged, SaO2 96% on room air.    Notified:  Dr. Benny Villagomez, phone [phone]    Interventions:  Patient was returned to bed using mechanical lift, Dr. Benny Villagomez performed neurological assessment.  Echocardiogram and device interrogation ordered.

## 2018-12-01 ENCOUNTER — APPOINTMENT (OUTPATIENT)
Dept: CT IMAGING | Facility: CLINIC | Age: 83
DRG: 291 | End: 2018-12-01
Attending: INTERNAL MEDICINE
Payer: MEDICARE

## 2018-12-01 LAB
ANION GAP SERPL CALCULATED.3IONS-SCNC: 7 MMOL/L (ref 3–14)
BUN SERPL-MCNC: 40 MG/DL (ref 7–30)
CALCIUM SERPL-MCNC: 7.4 MG/DL (ref 8.5–10.1)
CHLORIDE SERPL-SCNC: 107 MMOL/L (ref 94–109)
CO2 SERPL-SCNC: 23 MMOL/L (ref 20–32)
CREAT SERPL-MCNC: 1.57 MG/DL (ref 0.52–1.04)
CRP SERPL-MCNC: <2.9 MG/L (ref 0–8)
ERYTHROCYTE [DISTWIDTH] IN BLOOD BY AUTOMATED COUNT: 13.4 % (ref 10–15)
ERYTHROCYTE [SEDIMENTATION RATE] IN BLOOD BY WESTERGREN METHOD: 28 MM/H (ref 0–30)
FERRITIN SERPL-MCNC: 32 NG/ML (ref 8–252)
GFR SERPL CREATININE-BSD FRML MDRD: 31 ML/MIN/1.7M2
GLUCOSE SERPL-MCNC: 91 MG/DL (ref 70–99)
HCT VFR BLD AUTO: 35.2 % (ref 35–47)
HGB BLD-MCNC: 11.5 G/DL (ref 11.7–15.7)
IRON SATN MFR SERPL: 25 % (ref 15–46)
IRON SERPL-MCNC: 83 UG/DL (ref 35–180)
MAGNESIUM SERPL-MCNC: 2.4 MG/DL (ref 1.6–2.3)
MCH RBC QN AUTO: 33.2 PG (ref 26.5–33)
MCHC RBC AUTO-ENTMCNC: 32.7 G/DL (ref 31.5–36.5)
MCV RBC AUTO: 102 FL (ref 78–100)
NT-PROBNP SERPL-MCNC: 1238 PG/ML (ref 0–1800)
PLATELET # BLD AUTO: 239 10E9/L (ref 150–450)
POTASSIUM SERPL-SCNC: 4 MMOL/L (ref 3.4–5.3)
RBC # BLD AUTO: 3.46 10E12/L (ref 3.8–5.2)
SODIUM SERPL-SCNC: 137 MMOL/L (ref 133–144)
TIBC SERPL-MCNC: 326 UG/DL (ref 240–430)
VIT B12 SERPL-MCNC: 983 PG/ML (ref 193–986)
WBC # BLD AUTO: 7.4 10E9/L (ref 4–11)

## 2018-12-01 PROCEDURE — 83735 ASSAY OF MAGNESIUM: CPT | Performed by: STUDENT IN AN ORGANIZED HEALTH CARE EDUCATION/TRAINING PROGRAM

## 2018-12-01 PROCEDURE — A9270 NON-COVERED ITEM OR SERVICE: HCPCS | Mod: GY | Performed by: STUDENT IN AN ORGANIZED HEALTH CARE EDUCATION/TRAINING PROGRAM

## 2018-12-01 PROCEDURE — 82607 VITAMIN B-12: CPT | Performed by: STUDENT IN AN ORGANIZED HEALTH CARE EDUCATION/TRAINING PROGRAM

## 2018-12-01 PROCEDURE — 99233 SBSQ HOSP IP/OBS HIGH 50: CPT | Mod: GC | Performed by: INTERNAL MEDICINE

## 2018-12-01 PROCEDURE — 80048 BASIC METABOLIC PNL TOTAL CA: CPT | Performed by: STUDENT IN AN ORGANIZED HEALTH CARE EDUCATION/TRAINING PROGRAM

## 2018-12-01 PROCEDURE — 86140 C-REACTIVE PROTEIN: CPT | Performed by: STUDENT IN AN ORGANIZED HEALTH CARE EDUCATION/TRAINING PROGRAM

## 2018-12-01 PROCEDURE — 85652 RBC SED RATE AUTOMATED: CPT | Performed by: STUDENT IN AN ORGANIZED HEALTH CARE EDUCATION/TRAINING PROGRAM

## 2018-12-01 PROCEDURE — 71250 CT THORAX DX C-: CPT

## 2018-12-01 PROCEDURE — 94060 EVALUATION OF WHEEZING: CPT

## 2018-12-01 PROCEDURE — 25000132 ZZH RX MED GY IP 250 OP 250 PS 637: Mod: GY | Performed by: STUDENT IN AN ORGANIZED HEALTH CARE EDUCATION/TRAINING PROGRAM

## 2018-12-01 PROCEDURE — 83880 ASSAY OF NATRIURETIC PEPTIDE: CPT | Performed by: STUDENT IN AN ORGANIZED HEALTH CARE EDUCATION/TRAINING PROGRAM

## 2018-12-01 PROCEDURE — 36415 COLL VENOUS BLD VENIPUNCTURE: CPT | Performed by: STUDENT IN AN ORGANIZED HEALTH CARE EDUCATION/TRAINING PROGRAM

## 2018-12-01 PROCEDURE — 83540 ASSAY OF IRON: CPT | Performed by: STUDENT IN AN ORGANIZED HEALTH CARE EDUCATION/TRAINING PROGRAM

## 2018-12-01 PROCEDURE — 21400006 ZZH R&B CCU INTERMEDIATE UMMC

## 2018-12-01 PROCEDURE — 25000131 ZZH RX MED GY IP 250 OP 636 PS 637: Mod: GY | Performed by: STUDENT IN AN ORGANIZED HEALTH CARE EDUCATION/TRAINING PROGRAM

## 2018-12-01 PROCEDURE — 83550 IRON BINDING TEST: CPT | Performed by: STUDENT IN AN ORGANIZED HEALTH CARE EDUCATION/TRAINING PROGRAM

## 2018-12-01 PROCEDURE — 83921 ORGANIC ACID SINGLE QUANT: CPT | Performed by: STUDENT IN AN ORGANIZED HEALTH CARE EDUCATION/TRAINING PROGRAM

## 2018-12-01 PROCEDURE — 85027 COMPLETE CBC AUTOMATED: CPT | Performed by: STUDENT IN AN ORGANIZED HEALTH CARE EDUCATION/TRAINING PROGRAM

## 2018-12-01 PROCEDURE — 82728 ASSAY OF FERRITIN: CPT | Performed by: STUDENT IN AN ORGANIZED HEALTH CARE EDUCATION/TRAINING PROGRAM

## 2018-12-01 RX ADMIN — APIXABAN 2.5 MG: 2.5 TABLET, FILM COATED ORAL at 08:22

## 2018-12-01 RX ADMIN — ISOSORBIDE DINITRATE 10 MG: 10 TABLET ORAL at 08:22

## 2018-12-01 RX ADMIN — HYDRALAZINE HYDROCHLORIDE 10 MG: 10 TABLET, FILM COATED ORAL at 13:07

## 2018-12-01 RX ADMIN — AZATHIOPRINE 50 MG: 50 TABLET ORAL at 19:54

## 2018-12-01 RX ADMIN — ISOSORBIDE DINITRATE 10 MG: 10 TABLET ORAL at 17:17

## 2018-12-01 RX ADMIN — HYDRALAZINE HYDROCHLORIDE 10 MG: 10 TABLET, FILM COATED ORAL at 19:54

## 2018-12-01 RX ADMIN — CARVEDILOL 3.12 MG: 3.12 TABLET, FILM COATED ORAL at 08:22

## 2018-12-01 RX ADMIN — Medication 12.5 MG: at 08:22

## 2018-12-01 RX ADMIN — HYDRALAZINE HYDROCHLORIDE 10 MG: 10 TABLET, FILM COATED ORAL at 08:23

## 2018-12-01 RX ADMIN — ISOSORBIDE DINITRATE 10 MG: 10 TABLET ORAL at 11:54

## 2018-12-01 RX ADMIN — CARVEDILOL 3.12 MG: 3.12 TABLET, FILM COATED ORAL at 17:49

## 2018-12-01 RX ADMIN — RANITIDINE 150 MG: 150 TABLET ORAL at 08:22

## 2018-12-01 RX ADMIN — Medication 50 MG: at 19:56

## 2018-12-01 RX ADMIN — APIXABAN 2.5 MG: 2.5 TABLET, FILM COATED ORAL at 19:54

## 2018-12-01 RX ADMIN — FOLIC ACID 1 MG: 1 TABLET ORAL at 08:23

## 2018-12-01 RX ADMIN — LEVOTHYROXINE SODIUM 75 MCG: 75 TABLET ORAL at 08:21

## 2018-12-01 RX ADMIN — RANITIDINE 150 MG: 150 TABLET ORAL at 19:54

## 2018-12-01 ASSESSMENT — ACTIVITIES OF DAILY LIVING (ADL)
ADLS_ACUITY_SCORE: 12

## 2018-12-01 NOTE — PLAN OF CARE
Assumed care [id number]    Diagnosis: admitted w/ fatigue and shortness of breath    Assessment:  Alert and oriented. VSS on RA.   Tele: V paced with underlying a flutter  Neuros: baseline   Pain: denies  Skin/Incisions: intact  LDA: PIV / PICC  infusing / saline locked.   Diet: 2g sodium, good appetite  /GI: Voiding well on own, last BM yesterday.  Mobility: SBA + GB + walker   New this shift: orthostatic BP done, walked in hallway with walker and gait belt 2 rooms down, became short of breath. PFTs done today.    Plan: TCU once placement is found

## 2018-12-01 NOTE — CONSULTS
[facility]    Pulmonary, Allergy, Critical Care and Sleep Medicine    Initial Consultation  12/01/2018    Linda Spicer MRN# [id number]   Age: 87 year old YOB: 1931     Date of Admission: 11/28/2018    Primary care provider: Tre Lockett     Assessment and Recommendations:    87-year-old female with long-standing rheumatoid arthritis and associated ILD, A. fib on anticoagulation, diastolic CHF, CKD and hypothyroidism is admitted to the hospital with acute respiratory distress secondary to uncontrolled A. fib and CHF exacerbation.  Cardiology consulted pulmonary to evaluate contribution of her pulmonary fibrosis to her current dyspnea. Her CRP and ESR are WNL. BNP 2400.  It does not look like that patient has an ILD flare or an infection.  Given her stable PFTs it is unlikely that her ILD progressed.  Drug toxicity is another possibility however she has been on Orencia, imuran  for a long time. Also has had amiodarone for some time which could cause pul toxicity.     1. Acute Resp Failure2/2 CHF exacerbation and Afib  2. 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 RA: On abatacept and azathioprine    Recommendations:     Would get CT chest plain to evaluate progression of her ILD vs drug toxicity.     Continue diuresis as tolerated and a fib rate control.     Follow up with Dr. Perlman of pulmonary at [facility] after discharge.     Seen and discussed with Staff Dr Varinder SAWYER. Please see staff addendum for any changes/edits.     Romina Garcia  Pulmonary and Critical Care Fellow   Pager [phone]    Chief Complaint and History of Present Illness:    CC:  Dyspnea and weakness.   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 Associated with dizziness, presyncope and palpitations. In the ED the patient was hypertensive 154/59 Hr in the 60's and afebrile. Labs were significant for troponin was negative SCr 1.69 BUN 41 baseline around 1.3-1.4, Nt pro BNP is elevated at 2473. She has WBC of 12.4 hgb at baseline of 12.  The patient was treated with 40mg Iv lasix and sent to the floor. She was being diuresed by cardiology but her creat started rising. Repeat echo with mild-moderate mitral insufficiency. Device interrogation showing normal function, 80% AF burden.     Patient says that her rheumatoid arthritis affects her hands arms knees and feet.  She feels her rheumatoid arthritis is controlled.  She has not been on prednisone since 2015 when she stopped going to [facility].  Patient feels that she had exposure to formaldehyde the air which caused her to become short of breath.  Patient would feel markedly better when she would come back.  Has not been there since 2015.  Patient follows up with pulmonary and her PFTs show stable FVC and FEV1 over the course of last 4 years.  Patient now reports that for the last 1 week she has had worsening shortness of breath which starts if she is talking or on exertion.  Patient is not sure if this is related to her A. fib or her lung disease.  Patient has been diuresed while she was in the hospital her.  She is -1200 mL.  Patient was on 2 L oxygen on admission and right now she is on room air.    Pt has RA since 1973 and associated ILD.  She is on abatacept and Imuran currently last seen by Dr. Davenport Feb 2018. Previously treated with hydroxychloroquine 200 mg daily (stopped previously because of macular degeneration), methotrexate (leg cramps), Cimzia (stopped due to recurrent basal cell carcinoma and hx of heart failure). Has sulfa allergy. Leflunomide avoided because of ILD.  Currently on Orencia IV and azathioprine 50mg  daily. Pt is on trimethoprim only ppx.     Pt is being followed up by Dr. Comer of pul and was last seen in Jan 2018.  PFT I feb 2018 show stable FVC and FEV1. TLC still in normal limit and normal DLCO.     Review of Systems:  Complete 12 point ROS negative unless mentioned in HPI    Histories, Prior to Admission Medications, Allergies:    Past Medical History:  Past Medical History:   Diagnosis Date     Adjustment disorder with anxious mood 5/18/2015     Advanced directives, counseling/discussion 8/30/2012    Patient states has Advance Directive and will bring in a copy to clinic. 8/30/2012   [facility] Medical [facility] \       Anemia 9/25/2015     Basal cell cancer      CHF (congestive heart failure) (H) 9/18/2014     CKD (chronic kidney disease) stage 3, GFR 30-59 ml/min (H) 9/29/2015     DDD (degenerative disc disease), lumbar 9/25/2015     Diffuse idiopathic pulmonary fibrosis (H) 5/6/2013     Encounter for palliative care 5/18/2015     History of blood transfusion 9/29/2015     Hypertension goal BP (blood pressure) < 140/90 9/7/2012     Hypothyroid 9/7/2012     Irritable bowel syndrome 10/29/2013     Macular degeneration      Macular degeneration, 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     Nondisplaced spiral fracture of shaft of humerus      Osteoporosis 8/13/2013     Imo Update utility     RA (rheumatoid arthritis) (H) 5/7/2013     Rheumatic fever      Shingles      Spinal stenosis of lumbar region with neurogenic claudication 9/14/2015     Past Surgical History:  Past Surgical History:   Procedure Laterality Date     ANESTHESIA CARDIOVERSION N/A 9/14/2018    Procedure: ANESTHESIA CARDIOVERSION;;  Surgeon: GENERIC ANESTHESIA PROVIDER;  Location: [facility]     ANESTHESIA CARDIOVERSION N/A 10/11/2018    Procedure: ANESTHESIA CARDIOVERSION;  Cardioversion;  Surgeon: GENERIC ANESTHESIA PROVIDER;  Location: [facility] OR     ANESTHESIA CARDIOVERSION N/A 10/16/2018    Procedure: Anesthesia Cardioversion ;  Surgeon: GENERIC  ANESTHESIA PROVIDER;  Location: [facility] OR     APPENDECTOMY       BIOPSY      hemorrhoidectomy     ENT SURGERY      tonsillectomy     GYN SURGERY      3 D & C's     HYSTERECTOMY, PAP NO LONGER INDICATED       LAMINECTOMY LUMBAR ONE LEVEL N/A 10/13/2015    Procedure: LAMINECTOMY LUMBAR ONE LEVEL;  Surgeon: Fransico Toussaint MD;  Location: [facility] OR     Past Social History:  Social History     Social History     Marital status:      Spouse name: N/A     Number of children: N/A     Years of education: N/A     Occupational History     Not on file.     Social History Main Topics     Smoking status: Never Smoker     Smokeless tobacco: Never Used     Alcohol use Yes      Comment: rare wine      Drug use: No     Sexual activity: No     Other Topics Concern     Not on file     Social History Narrative    . Has six children. She enjoys bridge and genealogy. Her  has cancer. Her son has financial and alcohol issues.     ETOH:  Occassional.   Tobacco: no  Significant inhalational exposures: no  PPD/TB exposure status: fernando.   Family History:  Family History   Problem Relation Age of Onset     Hypertension Mother      Psychotic Disorder Father      Diabetes Son      Diabetes Daughter      Blood Disease Daughter      Family history negative for other ILD, sarcoidosis, lung cancer, rheumatoid arthritis lupus or other connective tissue diseases as per patient's knowledge  Medications:    apixaban ANTICOAGULANT  2.5 mg Oral BID     azaTHIOprine  50 mg Oral Daily     carvedilol  3.125 mg Oral BID w/meals     folic acid  1 mg Oral Daily     hydrALAZINE  10 mg Oral TID     isosorbide dinitrate  10 mg Oral TID AC     levothyroxine  75 mcg Oral Daily     ranitidine  150 mg Oral BID     spironolactone  12.5 mg Oral Daily     trimethoprim  50 mg Oral Daily     albuterol, - MEDICATION INSTRUCTIONS -, senna-docusate  Allergies:     Allergies   Allergen Reactions     Cephalexin Hcl Diarrhea     Gabapentin Other (See  Comments)     Dizzsiness     Naproxen GI Disturbance     Perfume      Macrobid [Nitrofurantoin Anhydrous]      Possibly related to lung disease      Seasonal Allergies      Sulfa Drugs      Throat swelling     Xanax [Alprazolam] Other (See Comments)     Dizziness      Ciprofloxacin Itching and Rash       Physical Exam:    Temp:  [96.7  F (35.9  C)-98.3  F (36.8  C)] 98.1  F (36.7  C)  Heart Rate:  [54-69] 62  Resp:  [16-18] 18  BP: (111-146)/(64-95) 135/95  SpO2:  [95 %-99 %] 99 %    Intake/Output Summary (Last 24 hours) at 12/01/18 0737  Last data filed at 12/01/18 0500   Gross per 24 hour   Intake              920 ml   Output              800 ml   Net              120 ml     General: laying in bed in NAD on room air but would get dyspnea on talking.   HEENT: anicteric, moist mucosa  Neck: no palpable lymphadenopathy, JVP ~ mid neck   Chest: tachypnea. NVB with basal crackles dry no wheeze.   Cardiac: RRR no murmurs  Abdomen: Soft, flat, non tender, active BS  Extremities: No LE Edema  Neuro: A&Ox3, no focal deficits   Skin: no rash noted    Laboratory, imaging, and microbiologic data:    All laboratory and imaging data reviewed.      Recent Labs  Lab 11/30/18  1022 11/29/18  1646 11/29/18  0651 11/28/18  1558   WBC 6.4  --  6.7 6.2   HGB 11.6*  --  12.2 12.4   *  --  102* 102*     --  249 270    137 141 136   POTASSIUM 3.7 4.0 3.6 3.8   CHLORIDE 105 104 106 104   CO2 25 26 28 26   BUN 42* 47* 38* 41*   CR 1.71* 1.79* 1.57* 1.69*   ANIONGAP 7 7 7 7   FATOUMATA 7.5* 7.5* 7.7* 7.8*   * 106* 93 110*   ALBUMIN  --   --   --  3.5   PROTTOTAL  --   --   --  7.5   BILITOTAL  --   --   --  0.4   ALKPHOS  --   --   --  65   ALT  --   --   --  21   AST  --   --   --  21   TROPI  --   --   --  <0.015     No lab results found in last 7 days.  Recent Results (from the past 4320 hour(s))   Echo limited (Adults Only)    Narrative    [id number]  [facility]11  [id number]  [id number]^CHAD^YANDY^JORGE L           [facility]  [facility]  Echocardiography Laboratory  [address] [place] [address]     Name: RG WALTER  MRN: [id number]  : 1931  Study Date: 2018 11:24 AM  Age: 87 yrs  Gender: Female  [facility] [place] [place]  Reason For Study: Mitral Valve Disorder  Ordering Physician: YANDY BONILLA  Performed By: Elver Smith RDCS     BSA: 1.7 m2  Height: 63 in  Weight: 156 lb  BP: 114/63 mmHg  _____________________________________________________________________________  __        Procedure  Limited Portable Echo Adult.  _____________________________________________________________________________  __        Interpretation Summary  Limited study.  Normal biventricular size and systolic function. The Ejection Fraction is  estimated at 55-60%.  Mild to moderate mitral insufficiency is present.  Mild to moderate tricuspid insufficiency is present.  Estimated pulmonary artery systolic pressure is 26 mmHg plus right atrial  pressure.  Compared to ECHO on 10/9, there are no significant changes. Mitral  regurgitation less prominent compared to recent YOLANDA (10/16).     _____________________________________________________________________________  __        Left Ventricle  Left ventricular size is normal. There is increased LV wall thickness. The  Ejection Fraction is estimated at 55-60%. No regional wall motion  abnormalities are seen.     Right Ventricle  The right ventricle is normal size. Global right ventricular function is  normal.     Atria  Moderate right atrial enlargement is present. Moderate left atrial enlargement  is present.        Mitral Valve  Mild to moderate mitral insufficiency is present.     Aortic Valve  Trace aortic insufficiency is present.     Tricuspid Valve  Mild to moderate tricuspid insufficiency is present. Estimated pulmonary  artery systolic pressure is 26 mmHg plus right atrial pressure. There are  multiple TR jets.     Pulmonic Valve  The pulmonic valve cannot  be assessed.     Vessels  The inferior vena cava is normal. The thoracic aorta cannot be assessed.     Pericardium  No pericardial effusion is present.        Compared to Previous Study  Compared to ECHO on 10/9, there are no significant changes. MR on YOLANDA (10/16)  appears more severe.  _____________________________________________________________________________  __           Doppler Measurements & Calculations  TV max P.5 mmHg  TR max adrian: 252.4 cm/sec  TR max P.5 mmHg     _____________________________________________________________________________  __           Report approved by: Blanca Adams 2018 02:40 PM      ECHO COMPLETE WITH OPTISON    Narrative    [id number]  [facility]73  [id number]  [id number]^DINAH^CRYS           [facility],[place]  Echocardiography Laboratory  [address] [place], [place] [address]     Name: RG WALTER  MRN: [id number]  : 1931  Study Date: 10/09/2018 01:09 PM  Age: 87 yrs  Gender: Female  Patient Location: [facility] [place]  Reason For Study: CHF  History: CHF  Ordering Physician: CRYS NIX  Referring Physician: ERIC ENGLE  Performed By: Sharonda Galo RDCS     BSA: 1.8 m2  Height: 63 in  Weight: 164 lb  BP: 180/87 mmHg  _____________________________________________________________________________  __        Procedure  Complete Portable Echo Adult. Contrast Optison. Optison (NDC #9341-6706-98)  given intravenously. Patient was given 6 ml mixture of 3 ml Optison and 6 ml  saline. 3 ml wasted.  _____________________________________________________________________________  __        Interpretation Summary  Global and regional left ventricular function is normal with an EF of 55-60%.  The right ventricle is not well visualized.  Mild to moderate tricuspid insufficiency is present.  There is mild pulmonary hypertension.  The inferior vena cava was normal in size.  This study was compared with the study from 14  .  There has been no change.     _____________________________________________________________________________  __        Left Ventricle  Global and regional left ventricular function is normal with an EF of 55-60%.  Left ventricular size is normal. Left ventricular wall thickness is normal.  Left ventricular diastolic function is indeterminate.     Right Ventricle  Likely normal RV function but unable to assess size. The right ventricle is  not well visualized.     Atria  Both atria appear normal. The atrial septum is intact as assessed by color  Doppler .     Mitral Valve  The mitral valve is normal. Mild to moderate mitral insufficiency is present.        Aortic Valve  Aortic valve is normal in structure and function. The aortic valve is  tricuspid. Mild aortic insufficiency is present.     Tricuspid Valve  Mild to moderate tricuspid insufficiency is present. Right ventricular  systolic pressure is 40mmHg above the right atrial pressure. Mild (pulmonary  artery systolic pressure<50mmHg) pulmonary hypertension is present.     Pulmonic Valve  The pulmonic valve is normal.     Vessels  The aorta root is normal. The thoracic aorta is normal. The pulmonary artery  is normal. The inferior vena cava was normal in size with preserved  respiratory variability. Estimated mean right atrial pressure is 3 mmHg  (normal).     Pericardium  No pericardial effusion is present.        Compared to Previous Study  This study was compared with the study from 8/28/14 . There has been no  change.     Attestation  I have personally viewed the imaging and agree with the interpretation and  report as documented by the fellow, Parris Duvall, and/or edited by me.  _____________________________________________________________________________  __     MMode/2D Measurements & Calculations  IVSd: 1.1 cm  LVIDd: 3.7 cm  LVIDs: 1.9 cm  LVPWd: 1.0 cm  FS: 47.7 %  LV mass(C)d: 122.1 grams  LV mass(C)dI: 68.7 grams/m2  Ao root diam: 2.7 cm  LA  dimension: 4.3 cm  asc Aorta Diam: 3.4 cm  LA/Ao: 1.6  LVOT diam: 2.0 cm  LVOT area: 3.1 cm2  LA Volume (BP): 51.3 ml     LA Volume Index (BP): 28.8 ml/m2  RWT: 0.54        Doppler Measurements & Calculations  MV E max alvarado: 104.0 cm/sec  MV A max alvarado: 33.8 cm/sec  MV E/A: 3.1  MV dec time: 0.14 sec  Ao V2 max: 89.2 cm/sec  Ao max PG: 3.0 mmHg  Ao V2 mean: 66.3 cm/sec  Ao mean P.0 mmHg  Ao V2 VTI: 16.6 cm  RAJ(I,D): 3.5 cm2  RAJ(V,D): 3.2 cm2  LV V1 max PG: 3.3 mmHg  LV V1 max: 90.7 cm/sec  LV V1 VTI: 18.7 cm  SV(LVOT): 58.7 ml  SI(LVOT): 33.1 ml/m2  PA acc time: 0.06 sec  TR max alvarado: 242.0 cm/sec  TR max P.7 mmHg  AV Alvarado Ratio (DI): 1.0  RAJ Index (cm2/m2): 2.0  Medial E/e': 19.5        _____________________________________________________________________________  __        Report approved by: Blanca Adams 10/09/2018 04:28 PM        PFT: 2018       PFT Latest Ref Rng & Units 2018   FVC L 1.85   FEV1 L 1.40   FVC% % 80   FEV1% % 80     Romina Garcia MD  Pulmonary Critical Care Fellow   [phone]  ------------------------------------------------------------------------------------------------------------

## 2018-12-01 NOTE — PROGRESS NOTES
Bedside PFT performed x 3 attempts.  Results are        Test    1    2    3  FEV1 (L) 0.87  1.02  0.94  FVC (L) 1.03  1.20  1.14  FEV1/FVC 85%  84%  82%  PEF (l/s) 2.46  2.54  2.60

## 2018-12-01 NOTE — PLAN OF CARE
Problem: Patient Care Overview  Goal: Plan of Care/Patient Progress Review  Pt A&Ox4, VSS, paced rhythm @60, denies pain.  DNR code status.  Pt c/o multiple dizzy spells and verbalized a concern that it may be r/t a fall in July when she hurt her jaw.  Pt  Moving very gingerly after the syncopal episode yesterday while at her PFT.  Continue to monitor and contact Cards 1 with concerns.

## 2018-12-01 NOTE — PLAN OF CARE
Problem: Patient Care Overview  Goal: Plan of Care/Patient Progress Review  D: Pt stable and comfortable. AVSS, paced. No pain noted. RUSSELL. Pt reported dizziness with movement. Pt verbalized understanding of importance of using call light for help before getting up.  I: Monitored/assessed pt. Assisted with cares.  A: Pt stable and comfortable.  P: Continue to monitor/assess pt, contact provider with concerns.

## 2018-12-01 NOTE — PROGRESS NOTES
Schedule Procedure:   Please Schedule 3 year colonoscopy recall, Last 9/14/2015  Procedure: Colonoscopy (85700) with SuPrep  Diagnosis: Colon Adenomas D12.6  Is patient:    Diabetic? No   On Coumadin, heparin, lovenox? No   On ASA/NSAIDS? Platelet Modifying (examples - Plavix, aspirin, nsaids, Aggrenox)? No  Prophylactic Antibiotics? No  Location: [facility]  Special Instructions:   MAC Anesthesia   Social Work Services Progress Note    Hospital Day: 4  Date of Initial Social Work Evaluation:  Not completed  Collaborated with:  [facility] , [facility], [facility]    Data:  SW involved for TCU placement    Intervention:  Discharge planning    Assessment:    * Update* SW spoke to Ella who explained could accept pt when medically ready as she has a bed available at this time. SW confirmed the information.    - Referrals in Process:    Addendum: Son called to start referrals to the following facilities:   [facility]  PH: [phone]  F: [phone]     [facility]  PH: [phone]  F: [phone]    Plan:    Anticipated Disposition:  Facility:  TBD    Barriers to d/c plan:  Placement    Follow Up:  SW will continue to follow    SANDEEP Gomez  Weekend   Pager: [phone]

## 2018-12-01 NOTE — PROGRESS NOTES
"                              Progress Note  Linda Spicer MRN: [id number]  Age: 87 year old, : 1931  Date: 2018            Interval History:     No acute events, doing well. She feels better today compared to prior. No lightheadedness.     PHYSICAL EXAM    Vital signs:  Temp: 98.1  F (36.7  C) Temp src: Oral BP: (!) 135/95   Heart Rate: 62 Resp: 18 SpO2: 99 % O2 Device: Nasal cannula Oxygen Delivery: 2 LPM Height: 161.3 cm (5' 3.5\") Weight: 71.1 kg (156 lb 11.2 oz)  Estimated body mass index is 27.32 kg/(m^2) as calculated from the following:    Height as of this encounter: 1.613 m (5' 3.5\").    Weight as of this encounter: 71.1 kg (156 lb 11.2 oz).      Intake/Output Summary (Last 24 hours) at 18 0716  Last data filed at 18 0500   Gross per 24 hour   Intake              920 ml   Output              800 ml   Net              120 ml       GEN: NAD, resting comfortably on exam, pleasant and cooperative , AAox3  HEENT: NC/AT , well injected conjunctiva, anicteric sclera, EOMI, PERRL  Neck: trachea midline, JVD difficult to assess  CV: RRR, nl S1/S2 no mumurs  PULM: basilar rales   Abdomen: soft, non tender/distented , BS +   EXTREMITIES: warm, no pedal edema  SKIN: No rashes  NEURO: MS: AAOx3 - no focal deficit     DIAGNOSTIC STUDIES  ROUTINE ICU LABS (Last four results)  CMP  Recent Labs  Lab 18  1022 18  1646 18  0651 18  1558    137 141 136   POTASSIUM 3.7 4.0 3.6 3.8   CHLORIDE 105 104 106 104   CO2 25 26 28 26   ANIONGAP 7 7 7 7   * 106* 93 110*   BUN 42* 47* 38* 41*   CR 1.71* 1.79* 1.57* 1.69*   GFRESTIMATED 28* 27* 31* 29*   GFRESTBLACK 34* 32* 38* 35*   FATOUMATA 7.5* 7.5* 7.7* 7.8*   PROTTOTAL  --   --   --  7.5   ALBUMIN  --   --   --  3.5   BILITOTAL  --   --   --  0.4   ALKPHOS  --   --   --  65   AST  --   --   --  21   ALT  --   --   --  21     CBC  Recent Labs  Lab 18  1022 18  0651 18  1558   WBC 6.4 6.7 6.2   RBC 3.50* 3.71* " 3.74*   HGB 11.6* 12.2 12.4   HCT 35.3 37.8 38.3   * 102* 102*   MCH 33.1* 32.9 33.2*   MCHC 32.9 32.3 32.4   RDW 13.4 13.6 13.6    249 270     INRNo lab results found in last 7 days.  Arterial Blood GasNo lab results found in last 7 days.    Echocardiogram:  Normal biventricular size and systolic function. The Ejection Fraction is  estimated at 55-60%.  Mild to moderate mitral insufficiency is present.  Mild to moderate tricuspid insufficiency is present.  Estimated pulmonary artery systolic pressure is 26 mmHg plus right atrial  pressure.  Compared to ECHO on 10/9, there are no significant changes. Mitral  regurgitation less prominent compared to recent YOLANDA (10/16).         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     #Plan for today:    - Work up anemia  - CT chest     #Shortness of breath  #HFpEF  - ADHF -> she presents with increased BNP and shortness of breath worsening for the past week  - she has been in flutter frequently and presents in flutter she does go in and out of it per last pacer check, rates are controlled though she may be very symptomatic depending on her atrial kick. She has been seen by EP and was tried on amiodarone which was discontinue given she keeps going in and out of AF. Unlikely to be related to mitral insufficiency given current reading of mild to moderate MI. Continues to have oxygen saturation of high 90s.    - not a candidate for Ablation given co morbidities per EP notes  - consider repeat DCCV  - on spironolactone 12.5 mg daily   - lasix; will do bedside US and decide on diuresis   - Retry PFTs today  - Pulmonology consulted given history of rheumatic pulmonary fibrosis, Plan for CT chest    - Neurology consulted to assess for possible neurological sources of her symptoms, work up for MG      # AF  - on apixaban for anticoagulation  - with very frequent cardioversions 9/14/18,10/11/18, 10/16/18  - rate controlled   - CHADSVASC4 on eliquis   - per device check has had 4 episodes since 11/17 1min->6 days   - given use of amiodarone and parenchymal involvement of rheumatic disease try to get PFT's to ensure etiology of shortness of breath is not worsening lung function      #Severe MR  - could benefit from afterload reduction and better bp control - hydral/isordil given NEIL    - may consider switching to lisinopril for afterload reduction.     chronic  #Rheumatoid disease with pulmonary involvement on azathioprine   # Hypothyroidism- continue thyroxine        Prophylaxis:  DVT: Apixiban GI: None   Family: Not updated  Disposition: discharge in 24 hours   Code Status: Full     Patient seen and discussed with Dr. Nieves, who agrees with above plan.    Genoveva Villagomez MD  Internal Medicine, PGY-3  Pager [phone]
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
## 2018-12-02 ENCOUNTER — APPOINTMENT (OUTPATIENT)
Dept: PHYSICAL THERAPY | Facility: CLINIC | Age: 83
DRG: 291 | End: 2018-12-02
Attending: INTERNAL MEDICINE
Payer: MEDICARE

## 2018-12-02 DIAGNOSIS — I49.5 SSS (SICK SINUS SYNDROME) (H): Primary | ICD-10-CM

## 2018-12-02 DIAGNOSIS — I49.5 SICK SINUS SYNDROME (H): Primary | ICD-10-CM

## 2018-12-02 LAB
ALBUMIN SERPL-MCNC: 3.1 G/DL (ref 3.4–5)
ALP SERPL-CCNC: 57 U/L (ref 40–150)
ALT SERPL W P-5'-P-CCNC: 18 U/L (ref 0–50)
ANION GAP SERPL CALCULATED.3IONS-SCNC: 8 MMOL/L (ref 3–14)
AST SERPL W P-5'-P-CCNC: 19 U/L (ref 0–45)
BILIRUB SERPL-MCNC: 0.4 MG/DL (ref 0.2–1.3)
BUN SERPL-MCNC: 34 MG/DL (ref 7–30)
CALCIUM SERPL-MCNC: 7.3 MG/DL (ref 8.5–10.1)
CHLORIDE SERPL-SCNC: 103 MMOL/L (ref 94–109)
CO2 SERPL-SCNC: 23 MMOL/L (ref 20–32)
CREAT SERPL-MCNC: 1.41 MG/DL (ref 0.52–1.04)
ERYTHROCYTE [DISTWIDTH] IN BLOOD BY AUTOMATED COUNT: 13.2 % (ref 10–15)
GFR SERPL CREATININE-BSD FRML MDRD: 35 ML/MIN/1.7M2
GLUCOSE SERPL-MCNC: 93 MG/DL (ref 70–99)
HCT VFR BLD AUTO: 32.9 % (ref 35–47)
HGB BLD-MCNC: 10.6 G/DL (ref 11.7–15.7)
MCH RBC QN AUTO: 32.8 PG (ref 26.5–33)
MCHC RBC AUTO-ENTMCNC: 32.2 G/DL (ref 31.5–36.5)
MCV RBC AUTO: 102 FL (ref 78–100)
PLATELET # BLD AUTO: 237 10E9/L (ref 150–450)
POTASSIUM SERPL-SCNC: 4.2 MMOL/L (ref 3.4–5.3)
PROT SERPL-MCNC: 6.6 G/DL (ref 6.8–8.8)
RBC # BLD AUTO: 3.23 10E12/L (ref 3.8–5.2)
SODIUM SERPL-SCNC: 134 MMOL/L (ref 133–144)
WBC # BLD AUTO: 6.2 10E9/L (ref 4–11)

## 2018-12-02 PROCEDURE — 83516 IMMUNOASSAY NONANTIBODY: CPT | Performed by: STUDENT IN AN ORGANIZED HEALTH CARE EDUCATION/TRAINING PROGRAM

## 2018-12-02 PROCEDURE — 21400006 ZZH R&B CCU INTERMEDIATE UMMC

## 2018-12-02 PROCEDURE — 97110 THERAPEUTIC EXERCISES: CPT | Mod: GP | Performed by: PHYSICAL THERAPIST

## 2018-12-02 PROCEDURE — 80053 COMPREHEN METABOLIC PANEL: CPT | Performed by: STUDENT IN AN ORGANIZED HEALTH CARE EDUCATION/TRAINING PROGRAM

## 2018-12-02 PROCEDURE — 36415 COLL VENOUS BLD VENIPUNCTURE: CPT | Performed by: STUDENT IN AN ORGANIZED HEALTH CARE EDUCATION/TRAINING PROGRAM

## 2018-12-02 PROCEDURE — 25000132 ZZH RX MED GY IP 250 OP 250 PS 637: Mod: GY | Performed by: STUDENT IN AN ORGANIZED HEALTH CARE EDUCATION/TRAINING PROGRAM

## 2018-12-02 PROCEDURE — A9270 NON-COVERED ITEM OR SERVICE: HCPCS | Mod: GY | Performed by: STUDENT IN AN ORGANIZED HEALTH CARE EDUCATION/TRAINING PROGRAM

## 2018-12-02 PROCEDURE — 85027 COMPLETE CBC AUTOMATED: CPT | Performed by: STUDENT IN AN ORGANIZED HEALTH CARE EDUCATION/TRAINING PROGRAM

## 2018-12-02 PROCEDURE — 83519 RIA NONANTIBODY: CPT | Performed by: STUDENT IN AN ORGANIZED HEALTH CARE EDUCATION/TRAINING PROGRAM

## 2018-12-02 PROCEDURE — 25000131 ZZH RX MED GY IP 250 OP 636 PS 637: Mod: GY | Performed by: STUDENT IN AN ORGANIZED HEALTH CARE EDUCATION/TRAINING PROGRAM

## 2018-12-02 PROCEDURE — 97116 GAIT TRAINING THERAPY: CPT | Mod: GP | Performed by: PHYSICAL THERAPIST

## 2018-12-02 PROCEDURE — 86255 FLUORESCENT ANTIBODY SCREEN: CPT | Performed by: STUDENT IN AN ORGANIZED HEALTH CARE EDUCATION/TRAINING PROGRAM

## 2018-12-02 PROCEDURE — 40000193 ZZH STATISTIC PT WARD VISIT: Performed by: PHYSICAL THERAPIST

## 2018-12-02 PROCEDURE — 97530 THERAPEUTIC ACTIVITIES: CPT | Mod: GP | Performed by: PHYSICAL THERAPIST

## 2018-12-02 PROCEDURE — 99232 SBSQ HOSP IP/OBS MODERATE 35: CPT | Mod: GC | Performed by: INTERNAL MEDICINE

## 2018-12-02 RX ORDER — FUROSEMIDE 20 MG
20 TABLET ORAL DAILY
Status: DISCONTINUED | OUTPATIENT
Start: 2018-12-02 | End: 2018-12-04 | Stop reason: HOSPADM

## 2018-12-02 RX ORDER — FUROSEMIDE 40 MG
40 TABLET ORAL DAILY
Status: DISCONTINUED | OUTPATIENT
Start: 2018-12-02 | End: 2018-12-04 | Stop reason: HOSPADM

## 2018-12-02 RX ADMIN — HYDRALAZINE HYDROCHLORIDE 10 MG: 10 TABLET, FILM COATED ORAL at 09:50

## 2018-12-02 RX ADMIN — Medication 12.5 MG: at 09:51

## 2018-12-02 RX ADMIN — CARVEDILOL 3.12 MG: 3.12 TABLET, FILM COATED ORAL at 09:50

## 2018-12-02 RX ADMIN — ISOSORBIDE DINITRATE 10 MG: 10 TABLET ORAL at 17:54

## 2018-12-02 RX ADMIN — AZATHIOPRINE 50 MG: 50 TABLET ORAL at 20:13

## 2018-12-02 RX ADMIN — HYDRALAZINE HYDROCHLORIDE 10 MG: 10 TABLET, FILM COATED ORAL at 20:13

## 2018-12-02 RX ADMIN — ISOSORBIDE DINITRATE 10 MG: 10 TABLET ORAL at 13:01

## 2018-12-02 RX ADMIN — RANITIDINE 150 MG: 150 TABLET ORAL at 20:13

## 2018-12-02 RX ADMIN — Medication 50 MG: at 20:13

## 2018-12-02 RX ADMIN — CARVEDILOL 3.12 MG: 3.12 TABLET, FILM COATED ORAL at 17:59

## 2018-12-02 RX ADMIN — HYDRALAZINE HYDROCHLORIDE 10 MG: 10 TABLET, FILM COATED ORAL at 15:00

## 2018-12-02 RX ADMIN — RANITIDINE 150 MG: 150 TABLET ORAL at 09:50

## 2018-12-02 RX ADMIN — APIXABAN 2.5 MG: 2.5 TABLET, FILM COATED ORAL at 09:50

## 2018-12-02 RX ADMIN — FUROSEMIDE 20 MG: 20 TABLET ORAL at 16:48

## 2018-12-02 RX ADMIN — APIXABAN 2.5 MG: 2.5 TABLET, FILM COATED ORAL at 20:13

## 2018-12-02 RX ADMIN — FUROSEMIDE 40 MG: 40 TABLET ORAL at 13:01

## 2018-12-02 RX ADMIN — LEVOTHYROXINE SODIUM 75 MCG: 75 TABLET ORAL at 08:58

## 2018-12-02 RX ADMIN — ISOSORBIDE DINITRATE 10 MG: 10 TABLET ORAL at 08:58

## 2018-12-02 RX ADMIN — FOLIC ACID 1 MG: 1 TABLET ORAL at 09:50

## 2018-12-02 ASSESSMENT — ACTIVITIES OF DAILY LIVING (ADL)
ADLS_ACUITY_SCORE: 13
ADLS_ACUITY_SCORE: 12

## 2018-12-02 NOTE — PLAN OF CARE
Problem: Patient Care Overview  Goal: Plan of Care/Patient Progress Review  Outcome: No Change  Pt A/O. See flowsheets for VS. BPs running high, tachypneic, Cards 1 updated/aware. Denies pain. Given PO Lasix x2 as ordered. Worked with therapy. Daughter supportive at bedside this afternoon. Continue with plan of care and report changes to treatment team. Plan for TCU, pending placement (per notes).

## 2018-12-02 NOTE — PROGRESS NOTES
[facility]    Pulmonary, Allergy, Critical Care and Sleep Medicine    Follow-up Note  12/02/2018    Assessment and Recommendations:    87-year-old female with long-standing rheumatoid arthritis associated ILD, A. fib on anticoagulation, diastolic CHF, CKD and hypothyroidism is admitted to the hospital with acute respiratory distress secondary to uncontrolled A. fib and CHF exacerbation.  Cardiology consulted pulmonary to evaluate contribution of her pulmonary fibrosis to her current dyspnea. Her CRP and ESR are WNL. BNP 2400.  It does not look like that patient has an ILD flare or an infection.  Given her stable PFTs it is unlikely that her ILD progressed. Ct scan on this admission 12/1 is not much changed from her previous 1 yr back.  Drug toxicity is another possibility however less likely.      1. Acute Resp Failure2/2 CHF exacerbation and Afib  2. 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 RA: On abatacept and azathioprine     Recommendations:     Given her stable CT chest findings, WNL, ESR, CRP we doubt that her pulmonary fibrosis had progressed or flared up causing her to have dyspnea. She has limited pulmonary reserves and this acute process has resulted in worsening dyspnea.     Continue diuresis as tolerated and a fib rate control.      Would recommend goals of care discussion and offer hospice if appropriate. Would defer the decision to primary team.     Follow up with Dr. Perlman of pulmonary at [facility] after discharge.      Seen and discussed with Staff Dr Hebert SAWYER. Please see staff addendum for any changes/edits.      Romina Garcia  Pulmonary and Critical Care Fellow      Subjective, Interval history:   PT on room air sat > 95%. Has dyspnea on talking and ambulation slightly better than yesterday.   PT reports recent loss of her  to cancer in Sep 2018 and being alone at her senior home. Was on her knees for 15 min.     PFT bedside 12/1, although her FVC is decreased from her baseline, this could be secondary to her current illness and should be repeated as out pt after resolution of her current illness.         Test                                       1                                          2                                          3  FEV1 (L)                    0.87                                     1.02                                     0.94  FVC (L)                     1.03                                     1.20                                     1.14  FEV1/FVC                 85%                                     84%                                     82%  PEF (l/s)                    2.46                                     2.54                                     2.60                      Objective:   Medications:    apixaban ANTICOAGULANT  2.5 mg Oral BID     azaTHIOprine  50 mg Oral Daily     carvedilol  3.125 mg Oral BID w/meals     folic acid  1 mg Oral Daily     hydrALAZINE  10 mg Oral TID     isosorbide dinitrate  10 mg Oral TID AC     levothyroxine  75 mcg Oral Daily     ranitidine  150 mg Oral BID     spironolactone  12.5 mg Oral Daily     trimethoprim  50 mg Oral Daily     albuterol, - MEDICATION INSTRUCTIONS -, senna-docusate    Physical Exam:  Temp:  [97.8  F (36.6  C)-98.3  F (36.8  C)] 97.8  F (36.6  C)  Heart Rate:  [59-64] 60  Resp:  [18] 18  BP: (108-148)/(60-92) 140/60  SpO2:  [96 %-99 %] 96 %    Intake/Output Summary (Last 24 hours) at 12/02/18 0725  Last data filed at 12/02/18 0645   Gross per 24 hour   Intake              240 ml   Output              750 ml   Net             -510 ml     General: laying in bed in NAD on room air but would get dyspnea on talking.   HEENT: anicteric, moist mucosa  Neck: no palpable lymphadenopathy, JVP ~ mid neck   Chest: tachypnea. NVB with basal crackles dry no wheeze.   Cardiac: RRR no murmurs  Abdomen: Soft, flat, non tender, active BS  Extremities: No  LE Edema  Neuro: A&Ox3, no focal deficits   Skin: no rash noted    Labs and imaging: All laboratory and imaging data personally reviewed.    CT chest 12/1  1. Compared to 2/20/2017, stable to slightly progressed basilar and  peripheral predominant fibrotic changes consistent with UIP pattern.  No CT evidence of acute ILD flare.  2. New pulmonary nodules, largest measuring 5 mm in the right lower  lobe compared to 9/11/2017. Follow-up per Fleischner Society criteria.  Multiple additional stable small pulmonary nodules are noted.         Recent Labs  Lab 12/02/18  0658 12/01/18  0759 11/30/18  1022 11/29/18  1646  11/28/18  1558   WBC 6.2 7.4 6.4  --   < > 6.2   HGB 10.6* 11.5* 11.6*  --   < > 12.4   * 102* 101*  --   < > 102*    239 239  --   < > 270   NA  --  137 138 137  < > 136   POTASSIUM  --  4.0 3.7 4.0  < > 3.8   CHLORIDE  --  107 105 104  < > 104   CO2  --  23 25 26  < > 26   BUN  --  40* 42* 47*  < > 41*   CR  --  1.57* 1.71* 1.79*  < > 1.69*   ANIONGAP  --  7 7 7  < > 7   FATOUMATA  --  7.4* 7.5* 7.5*  < > 7.8*   GLC  --  91 141* 106*  < > 110*   ALBUMIN  --   --   --   --   --  3.5   PROTTOTAL  --   --   --   --   --  7.5   BILITOTAL  --   --   --   --   --  0.4   ALKPHOS  --   --   --   --   --  65   ALT  --   --   --   --   --  21   AST  --   --   --   --   --  21   TROPI  --   --   --   --   --  <0.015   < > = values in this interval not displayed.  Recent Results (from the past 24 hour(s))   CT Chest w/o Contrast    Narrative    EXAMINATION: Chest CT  without contrast     CLINICAL HISTORY: History of pulmonary fibrosis evaluate for ILD  flare..    COMPARISON: CT 9/11/2017, 2/20/2017 and 9/18/2012.    TECHNIQUE: CT imaging obtained through the chest without contrast.  Coronal and axial MIP reformatted images obtained.     FINDINGS:  Question trace secretions in the proximal trachea, otherwise the  central airways are clear. Lungs are hyperexpanded. Redemonstration of  peripheral and basilar  predominant reticulation, architectural  distortion, and traction bronchiectasis. Basilar predominant  honeycombing is again noted and stable to slightly progressed from  either exam. No groundglass component. No focal consolidation, pleural  effusion, or pneumothorax. 5 mm solid pulmonary nodule right lower  lobe (series 6 image 131), new from 9/11/2017. Multiple additional  small pulmonary nodules are noted, some of which are new and some  which are stable from prior exam.    Left chest wall cardiac device with leads in the right atrium and  right ventricle. Cardiomegaly. No significant pericardial effusion.  Ascending aorta and main pulmonary trunk are normal caliber. Mild  coronary artery calcifications. Scattered calcifications of the aortic  arch and proximal great vessels. 1.3 cm subcarinal lymph node is  unchanged. Tiny sliding-type hernia. Patulous esophagus with debris  extending to the proximal esophagus.    Bones and soft tissues: No acute fracture. Multilevel degenerative  changes of the spine. Soft tissues are unremarkable..     Partially imaged upper abdomen: Partial visualized cystic structure  arising from the body of the pancreas, (series 2 image 57), better  characterized on recent CT abdomen 11/17/2018. Circular rim  calcification in the left upper quadrant measuring 1.3 cm, small  splenic artery aneurysm.       Impression    IMPRESSION:   1. Compared to 2/20/2017, stable to slightly progressed basilar and  peripheral predominant fibrotic changes consistent with UIP pattern.  No CT evidence of acute ILD flare.  2. New pulmonary nodules, largest measuring 5 mm in the right lower  lobe compared to 9/11/2017. Follow-up per Fleischner Society criteria.  Multiple additional stable small pulmonary nodules are noted.    I have personally reviewed the examination and initial interpretation  and I agree with the findings.    MD Romina CENTENO MD  Pulmonary Critical Care  Fellow

## 2018-12-02 NOTE — PLAN OF CARE
Problem: Patient Care Overview  Goal: Plan of Care/Patient Progress Review  Discharge Planner PT   Patient plan for discharge: TCU  Current status: Pt transfers with mod I-SBA. Ambulated 130 ft with FWW, CGA. Limited by RUSSELL. Issued HEP for proximal strength. Encourage ambulation 4x/day with assist to improve activity tolerance.   Barriers to return to prior living situation: deconditioning  Recommendations for discharge: TCU  Rationale for recommendations: progress strength and aerobic endurance       Entered by: Asia Parra 12/02/2018 12:55 PM

## 2018-12-02 NOTE — PROGRESS NOTES
BRIEF NEUROLOGY NOTE:    Chart reviewed, patient discussed on rounds, not examined. Myasthenia panel drawn and pending, will continue to follow peripherally for this result. Would recommend continued follow up with pulmonology for treatment of underlying pulmonary causes of SOB prior to extensive workup of neurologic causes. Would recommend she follow up with neuromuscular neurologist as an outpatient (can seen Dr. Bond in [place] seeing as this is where she gets some of her care).    Neurology will follow peripherally for lab results. Do not hesitate to contact us with any questions or concerns.    Patient care discussed with staff neurologist, Dr. Burks.    Bhanu Jones MD  Neurology PGY-3

## 2018-12-02 NOTE — PLAN OF CARE
Problem: Patient Care Overview  Goal: Plan of Care/Patient Progress Review  Outcome: No Change  Pt with rheumatoid arthritis and ILD is V-paced 60s-70s with 0-11 PVCs, and 0-5 cplts. Ambulated x 1 with the walker and gait belt in the magallanes, some RUSSELL. No dizziness. PFTs done today.

## 2018-12-02 NOTE — PLAN OF CARE
Problem: Patient Care Overview  Goal: Plan of Care/Patient Progress Review  D AVSS with sat's 95% on room air. Heart Vpaced with underlying Afib. Lungs decreased in bases otherwise clear. Has voiced no c/o pain or nausea and has slept well between cares.   Up to bathroom x 2. The first time patient got up she was unable to void with a bladder scan of 259 ml then recently got up to bathroom and was able to void 550 ml of yellow/clear urine.   I Vital's, assessment and med's per order.   A Resting in bed with call light in reach.   P Continue to monitor and update MD with changes.

## 2018-12-02 NOTE — PROGRESS NOTES
"                              Progress Note  Linda Spicer MRN: [id number]  Age: 87 year old, : 1931  Date: 2018            Interval History:     No acute events, had PFTs yesterday - doing well this AM. Sitting in chair.     PHYSICAL EXAM    Vital signs:  Temp: 97.8  F (36.6  C) Temp src: Oral BP: 140/60   Heart Rate: 60 Resp: 18 SpO2: 96 % O2 Device: None (Room air) Oxygen Delivery: 2 LPM Height: 161.3 cm (5' 3.5\") Weight: 72.3 kg (159 lb 6.4 oz)  Estimated body mass index is 27.79 kg/(m^2) as calculated from the following:    Height as of this encounter: 1.613 m (5' 3.5\").    Weight as of this encounter: 72.3 kg (159 lb 6.4 oz).      Intake/Output Summary (Last 24 hours) at 18 0716  Last data filed at 18 0500   Gross per 24 hour   Intake              920 ml   Output              800 ml   Net              120 ml       GEN: NAD, resting comfortably on exam, pleasant and cooperative , AAox3  HEENT: NC/AT , well injected conjunctiva, anicteric sclera, EOMI, PERRL  Neck: trachea midline, JVD difficult to assess  CV: RRR, nl S1/S2 no mumurs  PULM: basilar inspiratory crackles   Abdomen: soft, non tender/distented , BS +   EXTREMITIES: warm, no pedal edema  SKIN: No rashes  NEURO: MS: AAOx3 - no focal deficit     DIAGNOSTIC STUDIES  ROUTINE ICU LABS (Last four results)  CMP    Recent Labs  Lab 18  0759 18  1022 18  1646 18  0651 18  1558    138 137 141 136   POTASSIUM 4.0 3.7 4.0 3.6 3.8   CHLORIDE 107 105 104 106 104   CO2 23 25 26 28 26   ANIONGAP 7 7 7 7 7   GLC 91 141* 106* 93 110*   BUN 40* 42* 47* 38* 41*   CR 1.57* 1.71* 1.79* 1.57* 1.69*   GFRESTIMATED 31* 28* 27* 31* 29*   GFRESTBLACK 38* 34* 32* 38* 35*   FATOUMATA 7.4* 7.5* 7.5* 7.7* 7.8*   MAG 2.4*  --   --   --   --    PROTTOTAL  --   --   --   --  7.5   ALBUMIN  --   --   --   --  3.5   BILITOTAL  --   --   --   --  0.4   ALKPHOS  --   --   --   --  65   AST  --   --   --   --  21   ALT  --   --   -- "   --  21     CBC    Recent Labs  Lab 12/01/18  0759 11/30/18  1022 11/29/18  0651 11/28/18  1558   WBC 7.4 6.4 6.7 6.2   RBC 3.46* 3.50* 3.71* 3.74*   HGB 11.5* 11.6* 12.2 12.4   HCT 35.2 35.3 37.8 38.3   * 101* 102* 102*   MCH 33.2* 33.1* 32.9 33.2*   MCHC 32.7 32.9 32.3 32.4   RDW 13.4 13.4 13.6 13.6    239 249 270     INRNo lab results found in last 7 days.  Arterial Blood GasNo lab results found in last 7 days.    Echocardiogram:  Normal biventricular size and systolic function. The Ejection Fraction is  estimated at 55-60%.  Mild to moderate mitral insufficiency is present.  Mild to moderate tricuspid insufficiency is present.  Estimated pulmonary artery systolic pressure is 26 mmHg plus right atrial  pressure.  Compared to ECHO on 10/9, there are no significant changes. Mitral  regurgitation less prominent compared to recent YOLANDA (10/16).   ---------------    CT chest:  1. Compared to 2/20/2017, stable to slightly progressed basilar and  peripheral predominant fibrotic changes consistent with UIP pattern.  No CT evidence of acute ILD flare.  2. New pulmonary nodules, largest measuring 5 mm in the right lower  lobe compared to 9/11/2017. Follow-up per Fleischner Society criteria.  Multiple additional stable small pulmonary nodules are noted.    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     #Plan for today:    - Pending placement     #Dyspnea on exertion   #HFpEF  #Rheumatoid pulmonary fibrosis   This is likely multifactorial given HFpEF , pulmonary fibrosis ,  and deconitioning. Patient received IV diuresis and felt better but symptoms failed to resolve  completely . Pulmonary medicine evaluated and there is no evidence of pulmonary disease flare or infectious process. CT chest didn't show significant progression of PF.     This could be related to her A.fib and being in NSR may improve her symptoms. However controlling her A.fib is difficult and may be futile . She has failed multiple cardioversion , was tried on amiodarone but was discontinue given it failed to suppress AF significantly, and is not a candidate for ablaiton.      Her mitral insufficiency is unlikely to be causing her symptoms given that its only mild to moderate MI.     - Not a candidate for Ablation given co morbidities per EP notes  - on spironolactone 12.5 mg daily   - lasix; resume PTA 40 mg / 20 mg   - Pulmonology consulted given history of rheumatic pulmonary fibrosis, CT chest with no major changes   - Neurology consulted to assess for possible neurological sources of her symptoms, work up for MG     #Lighheadeness   Vasovagal vs A.fib related (atria kick dependant) vs orthostatic (negative orthostatic vitals). Neuro evaluated for other causes and per assessment likely vasovagal.   - Compression stocking   - Per Neurology MG work up   - A.fib as above     # AF  - on apixaban for anticoagulation  - with very frequent cardioversions 9/14/18,10/11/18, 10/16/18  - rate controlled   - CHADSVASC4 on eliquis   - per device check has had 4 episodes since 11/17 1min->6 days       #Severe MR  - could benefit from afterload reduction and better bp control - hydral/isordil given NEIL    - may consider switching to lisinopril for afterload reduction.     #Macrocytic Anemia   Normal B12, MMA pending. Further workup deferred to outpatient setting.     chronic  #Rheumatoid disease with pulmonary involvement on azathioprine   # Hypothyroidism- continue thyroxine        Prophylaxis:  DVT: Apixiban GI: None   Family: Not updated  Disposition: Plan for TCU, pending placement  Code Status: Full     Patient seen  and discussed with Dr. Nieves, who agrees with above plan.    Genoveva Villagomez MD  Internal Medicine, PGY-3  Pager [phone]

## 2018-12-03 ENCOUNTER — ALLIED HEALTH/NURSE VISIT (OUTPATIENT)
Dept: CARDIOLOGY | Facility: CLINIC | Age: 83
DRG: 291 | End: 2018-12-03
Attending: INTERNAL MEDICINE
Payer: MEDICARE

## 2018-12-03 ENCOUNTER — APPOINTMENT (OUTPATIENT)
Dept: PHYSICAL THERAPY | Facility: CLINIC | Age: 83
DRG: 291 | End: 2018-12-03
Attending: INTERNAL MEDICINE
Payer: MEDICARE

## 2018-12-03 DIAGNOSIS — I49.5 SICK SINUS SYNDROME (H): Primary | ICD-10-CM

## 2018-12-03 LAB
ANION GAP SERPL CALCULATED.3IONS-SCNC: 8 MMOL/L (ref 3–14)
BUN SERPL-MCNC: 30 MG/DL (ref 7–30)
CALCIUM SERPL-MCNC: 7.5 MG/DL (ref 8.5–10.1)
CHLORIDE SERPL-SCNC: 104 MMOL/L (ref 94–109)
CO2 SERPL-SCNC: 22 MMOL/L (ref 20–32)
CREAT SERPL-MCNC: 1.48 MG/DL (ref 0.52–1.04)
ERYTHROCYTE [DISTWIDTH] IN BLOOD BY AUTOMATED COUNT: 13 % (ref 10–15)
GFR SERPL CREATININE-BSD FRML MDRD: 33 ML/MIN/1.7M2
GLUCOSE SERPL-MCNC: 81 MG/DL (ref 70–99)
HCT VFR BLD AUTO: 32.6 % (ref 35–47)
HGB BLD-MCNC: 10.7 G/DL (ref 11.7–15.7)
INTERPRETATION ECG - MUSE: NORMAL
MCH RBC QN AUTO: 33.2 PG (ref 26.5–33)
MCHC RBC AUTO-ENTMCNC: 32.8 G/DL (ref 31.5–36.5)
MCV RBC AUTO: 101 FL (ref 78–100)
PLATELET # BLD AUTO: 232 10E9/L (ref 150–450)
POTASSIUM SERPL-SCNC: 4.4 MMOL/L (ref 3.4–5.3)
RBC # BLD AUTO: 3.22 10E12/L (ref 3.8–5.2)
SODIUM SERPL-SCNC: 134 MMOL/L (ref 133–144)
WBC # BLD AUTO: 5.9 10E9/L (ref 4–11)

## 2018-12-03 PROCEDURE — 25000132 ZZH RX MED GY IP 250 OP 250 PS 637: Mod: GY | Performed by: STUDENT IN AN ORGANIZED HEALTH CARE EDUCATION/TRAINING PROGRAM

## 2018-12-03 PROCEDURE — 85027 COMPLETE CBC AUTOMATED: CPT | Performed by: STUDENT IN AN ORGANIZED HEALTH CARE EDUCATION/TRAINING PROGRAM

## 2018-12-03 PROCEDURE — 80048 BASIC METABOLIC PNL TOTAL CA: CPT | Performed by: STUDENT IN AN ORGANIZED HEALTH CARE EDUCATION/TRAINING PROGRAM

## 2018-12-03 PROCEDURE — A9270 NON-COVERED ITEM OR SERVICE: HCPCS | Mod: GY | Performed by: STUDENT IN AN ORGANIZED HEALTH CARE EDUCATION/TRAINING PROGRAM

## 2018-12-03 PROCEDURE — 21400006 ZZH R&B CCU INTERMEDIATE UMMC

## 2018-12-03 PROCEDURE — 93280 PM DEVICE PROGR EVAL DUAL: CPT | Mod: 26 | Performed by: INTERNAL MEDICINE

## 2018-12-03 PROCEDURE — 40000193 ZZH STATISTIC PT WARD VISIT

## 2018-12-03 PROCEDURE — 25000131 ZZH RX MED GY IP 250 OP 636 PS 637: Mod: GY | Performed by: STUDENT IN AN ORGANIZED HEALTH CARE EDUCATION/TRAINING PROGRAM

## 2018-12-03 PROCEDURE — 36415 COLL VENOUS BLD VENIPUNCTURE: CPT | Performed by: STUDENT IN AN ORGANIZED HEALTH CARE EDUCATION/TRAINING PROGRAM

## 2018-12-03 PROCEDURE — 97110 THERAPEUTIC EXERCISES: CPT | Mod: GP

## 2018-12-03 PROCEDURE — 97530 THERAPEUTIC ACTIVITIES: CPT | Mod: GP

## 2018-12-03 PROCEDURE — 99232 SBSQ HOSP IP/OBS MODERATE 35: CPT | Mod: 25 | Performed by: INTERNAL MEDICINE

## 2018-12-03 PROCEDURE — 93280 PM DEVICE PROGR EVAL DUAL: CPT | Mod: ZF

## 2018-12-03 RX ORDER — HYDRALAZINE HYDROCHLORIDE 10 MG/1
10 TABLET, FILM COATED ORAL
Status: DISCONTINUED | OUTPATIENT
Start: 2018-12-03 | End: 2018-12-04

## 2018-12-03 RX ADMIN — HYDRALAZINE HYDROCHLORIDE 10 MG: 10 TABLET, FILM COATED ORAL at 08:51

## 2018-12-03 RX ADMIN — ISOSORBIDE DINITRATE 10 MG: 10 TABLET ORAL at 08:51

## 2018-12-03 RX ADMIN — Medication 50 MG: at 20:36

## 2018-12-03 RX ADMIN — AZATHIOPRINE 50 MG: 50 TABLET ORAL at 20:33

## 2018-12-03 RX ADMIN — FOLIC ACID 1 MG: 1 TABLET ORAL at 08:51

## 2018-12-03 RX ADMIN — FUROSEMIDE 20 MG: 20 TABLET ORAL at 15:32

## 2018-12-03 RX ADMIN — Medication 12.5 MG: at 08:51

## 2018-12-03 RX ADMIN — FUROSEMIDE 40 MG: 40 TABLET ORAL at 08:52

## 2018-12-03 RX ADMIN — RANITIDINE 150 MG: 150 TABLET ORAL at 08:51

## 2018-12-03 RX ADMIN — LEVOTHYROXINE SODIUM 75 MCG: 75 TABLET ORAL at 08:51

## 2018-12-03 RX ADMIN — APIXABAN 2.5 MG: 2.5 TABLET, FILM COATED ORAL at 08:52

## 2018-12-03 RX ADMIN — CARVEDILOL 3.12 MG: 3.12 TABLET, FILM COATED ORAL at 08:52

## 2018-12-03 RX ADMIN — RANITIDINE 150 MG: 150 TABLET ORAL at 20:33

## 2018-12-03 RX ADMIN — APIXABAN 2.5 MG: 2.5 TABLET, FILM COATED ORAL at 20:33

## 2018-12-03 RX ADMIN — CARVEDILOL 3.12 MG: 3.12 TABLET, FILM COATED ORAL at 17:16

## 2018-12-03 ASSESSMENT — ACTIVITIES OF DAILY LIVING (ADL)
ADLS_ACUITY_SCORE: 12

## 2018-12-03 NOTE — PROGRESS NOTES
Preliminary Device Interrogation Results.  Final physician signed paceart report to be scanned and attached.    Patient seen at [facility] for evaluation and iterative programming of [employer]onic Advisa dual lead pacemaker per MD orders. Per Physician orders LR changed from 60 bpm to 70 bpm. Normal pacemaker function. Since last  session Nov 29th, no new episodes/arrhythmias recorded. Presenting rhythm Ap VS @ 60 bpm.  Intrinsic rhythm = SB @ 54 bpm.  AP = 45.7%.   = 40.2%.  Estimated battery longevity to ZENAIDA = 7 years. When doing atrial capture thresholds at 90 bpm (AAI) the AV conduction moved from 1;1 to 2:1 while patient was sitting up in bed.  No symptoms, and communicated this result to patient nurse and ordering physician.    dual lead pacemaker

## 2018-12-03 NOTE — PROGRESS NOTES
"Feeling SOB, Fatigued, \"I just dont feel right\"    /62 (BP Location: Right arm)  Pulse 65  Temp 98  F (36.7  C) (Oral)  Resp 20  Ht 1.613 m (5' 3.5\")  Wt 71.7 kg (158 lb)  LMP  (LMP Unknown)  SpO2 99%  BMI 27.55 kg/m2    Pt c/o of having symptoms that brought her in to hospital. She C/o of SOB, but sats were 98% RA, feeling tired and not able to catch her breath. MD was notified and was at bedside right away. New orders received.    1400:  Pr reports to be feeling better, pt reports O2 seems to be helping. Daughter at bedside and POC reviewed.  "

## 2018-12-03 NOTE — PROGRESS NOTES
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     Changes today:  - Medically ready for discharge; awaiting TCU placement    # Dyspnea on exertion   # Chronic HFpEF  # Rheumatoid disease pulmonary fibrosis  Dyspnea likely multifactorial in the setting of known HFpEF, pulmonary fibrosis, atrial fibrillation and deconditioning. Symptoms improved somewhat s/p diuresis. Appears euvolemic on exam near .  lbs. Currently in NSR, continuing rate control for Afib (not candidate for ablation given co-morbidities per EP notes).     - Pulmonology consulted, appreciate recs:   - Given stable CT chest, it is unlikely that pulmonary fibrosis has progressed or flared, although she  has limited pulmonary reserves   - Neurology consulted, appreciate recs:   - MG workup in process. Follow up with neuromuscular neurologist as outpatient (Dr. Bond in [place])   - Diuresis:continue lasix 40mg/20mg  - Ald ant: continue spironalactone 12.5mg daily     # Lightheadedness  Felt to be secondary to vasovagal etiolgoy vs Afib vs orthostatic (although orthostatic vitals negative). Afib discussed below.  - PPM rate increased to 70bpm on 12/3 without improvement in symptoms  - Neurology consulted. Symptoms are likely secondary to a combination of pulmonary and cardiac factors.  MG workup ongoing.   - Compression stockings    # Atrial fibrillation   CHADsVASC 4, on apixaban. History of multiple cardioversions 9/14/18,10/11/18, 10/16/18. Has previously tried amiodarone but discontinued due to failure to significantly suppress AF. Not an ablation candidate. May be contributing to dyspnea discussed above. Currently rate  "controlled.  Per device check, has had 4 episodes since 11/17 ranging from 1 minute to 6 days.     # Mild to moderate MR  May benefit from afterload reduction and improved BP control; however, this has been complicated by presyncope. Previously started isodil/hyral this admission, now holding given recurrent pre-syncopal episodes.  - Recommend close outpatient follow up for consideration of afterload reduction if pre-syncopal events resolve     # Macrocytic anemia   Normal B12, MMA pending. No active bleeding. Recommend ongoing workup as outpatient.     # Rheumatoid disease   On azathioprine PTA, continued during admission. Also on abatacept q28 days, continue per rheumatology.     # Hypothyroidism   - Continue levothyroxine     FEN: 2g sodium diet   Prophylaxis: DVT apixaban   Dispo: Medically ready for discharge to TCU pending placement   CODE status: Full     Patient seen and discussed with Dr. Littlejohn.     Peggy Ramos MD  Internal Medicine, PGY-2  Cardiology Service   [phone]        Interval History:   Hemodynamically stable overnight. Reports ongoing dizziness when walking or sitting in chair, improved with lying down. Placed on 2L O2 for comfort, weaned off early this morning. PPM lower rate limit increased to 70bpm in case symptoms are due to chronotropic incompetence.           Medications:   Reviewed in chart. No changes today, will continue to hold hydral/isordil given pre-syncopal events.           Physical Exam:   Vitals: /72 (BP Location: Left arm, Cuff Size: Adult Regular)  Pulse 69  Temp 97.5  F (36.4  C) (Oral)  Resp 18  Ht 1.613 m (5' 3.5\")  Wt 70.9 kg (156 lb 4.8 oz)  LMP  (LMP Unknown)  SpO2 98%  BMI 27.25 kg/m2  BMI= Body mass index is 27.25 kg/(m^2).      Intake/Output Summary (Last 24 hours) at 12/04/18 1247  Last data filed at 12/04/18 1144   Gross per 24 hour   Intake              410 ml   Output             2350 ml   Net            -1940 ml       General: alert, " interactive, NAD  HEENT: sclera anicteric, EOM grossly intact, mucous membranes moist   Cardiovascular: regular rate and rhythm, normal S1 and S2, no murmurs, no gallops, no rubs appreciated   Resp: clear to auscultation bilaterally apart from soft bibasilar crackles  GI: soft, nontender, nondistended. +BS.   Extremities: warm, no LE edema, no cyanosis or clubbing  Skin: no jaundice or rash on exposed skin, lines in place without surrounding erythema  Neuro:  alert & oriented x 3, non-focal, moving all extremities in bed, CN II-XII grossly intact although becomes dizzy with lateral eye movement bilaterally   Psych: appropriate affect           Data:   Labs and images reviewed in chart. Notable for:    CMP  Recent Labs  Lab 12/03/18 0547 12/02/18 0658 12/01/18 0759 11/30/18  1022  11/28/18  1558    134 137 138  < > 136   POTASSIUM 4.4 4.2 4.0 3.7  < > 3.8   CHLORIDE 104 103 107 105  < > 104   CO2 22 23 23 25  < > 26   ANIONGAP 8 8 7 7  < > 7   GLC 81 93 91 141*  < > 110*   BUN 30 34* 40* 42*  < > 41*   CR 1.48* 1.41* 1.57* 1.71*  < > 1.69*   GFRESTIMATED 33* 35* 31* 28*  < > 29*   GFRESTBLACK 40* 43* 38* 34*  < > 35*   FATOUMATA 7.5* 7.3* 7.4* 7.5*  < > 7.8*   MAG  --   --  2.4*  --   --   --    PROTTOTAL  --  6.6*  --   --   --  7.5   ALBUMIN  --  3.1*  --   --   --  3.5   BILITOTAL  --  0.4  --   --   --  0.4   ALKPHOS  --  57  --   --   --  65   AST  --  19  --   --   --  21   ALT  --  18  --   --   --  21   < > = values in this interval not displayed.  CBC  Recent Labs  Lab 12/03/18 0547 12/02/18 0658 12/01/18 0759 11/30/18  1022   WBC 5.9 6.2 7.4 6.4   RBC 3.22* 3.23* 3.46* 3.50*   HGB 10.7* 10.6* 11.5* 11.6*   HCT 32.6* 32.9* 35.2 35.3   * 102* 102* 101*   MCH 33.2* 32.8 33.2* 33.1*   MCHC 32.8 32.2 32.7 32.9   RDW 13.0 13.2 13.4 13.4    237 239 239     Echocardiogram:  Normal biventricular size and systolic function. The Ejection Fraction is  estimated at 55-60%.  Mild to moderate mitral  insufficiency is present.  Mild to moderate tricuspid insufficiency is present.  Estimated pulmonary artery systolic pressure is 26 mmHg plus right atrial  pressure.  Compared to ECHO on 10/9, there are no significant changes. Mitral  regurgitation less prominent compared to recent YOLANDA (10/16).   ---------------     CT chest:  1. Compared to 2/20/2017, stable to slightly progressed basilar and  peripheral predominant fibrotic changes consistent with UIP pattern.  No CT evidence of acute ILD flare.  2. New pulmonary nodules, largest measuring 5 mm in the right lower  lobe compared to 9/11/2017. Follow-up per Fleischner Society criteria.  Multiple additional stable small pulmonary nodules are noted.     PFTs:  FEV1 0.87 of pred   FVC 1.03 of pred  FEV/FVC ~85%

## 2018-12-03 NOTE — PROGRESS NOTES
"                              Progress Note  Linda Spicer MRN: [id number]  Age: 87 year old, : 1931  Date: 2018            Interval History:     Had vasovagal episode after getting up from the toilet - was dizzy, lightheaded and diaphoretic. No LOC, tele and VS unremarkable.     PHYSICAL EXAM    Vital signs:  Temp: 98  F (36.7  C) Temp src: Oral BP: 155/62   Heart Rate: 59 Resp: 20 SpO2: 99 % O2 Device: None (Room air) Oxygen Delivery: 2 LPM Height: 161.3 cm (5' 3.5\") Weight: 71.7 kg (158 lb)  Estimated body mass index is 27.55 kg/(m^2) as calculated from the following:    Height as of this encounter: 1.613 m (5' 3.5\").    Weight as of this encounter: 71.7 kg (158 lb).    Intake/Output Summary (Last 24 hours) at 18 0716  Last data filed at 18 0500   Gross per 24 hour   Intake              920 ml   Output              800 ml   Net              120 ml     GEN: NAD, resting comfortably on exam, pleasant and cooperative , AAox3  HEENT: NC/AT , well injected conjunctiva, anicteric sclera, EOMI, PERRL  Neck: trachea midline, JVD difficult to assess  CV: RRR, nl S1/S2 no mumurs  PULM: basilar inspiratory crackles   Abdomen: soft, non tender/distented , BS +   EXTREMITIES: warm, no pedal edema  SKIN: No rashes  NEURO: MS: AAOx3 - no focal deficit     DIAGNOSTIC STUDIES  ROUTINE ICU LABS (Last four results)  CMP    Recent Labs  Lab 18  0547 18  0658 18  0759 18  1022  18  1558    134 137 138  < > 136   POTASSIUM 4.4 4.2 4.0 3.7  < > 3.8   CHLORIDE 104 103 107 105  < > 104   CO2 22 23 23 25  < > 26   ANIONGAP 8 8 7 7  < > 7   GLC 81 93 91 141*  < > 110*   BUN 30 34* 40* 42*  < > 41*   CR 1.48* 1.41* 1.57* 1.71*  < > 1.69*   GFRESTIMATED 33* 35* 31* 28*  < > 29*   GFRESTBLACK 40* 43* 38* 34*  < > 35*   FATOUMATA 7.5* 7.3* 7.4* 7.5*  < > 7.8*   MAG  --   --  2.4*  --   --   --    PROTTOTAL  --  6.6*  --   --   --  7.5   ALBUMIN  --  3.1*  --   --   --  3.5   BILITOTAL  " --  0.4  --   --   --  0.4   ALKPHOS  --  57  --   --   --  65   AST  --  19  --   --   --  21   ALT  --  18  --   --   --  21   < > = values in this interval not displayed.  CBC    Recent Labs  Lab 12/03/18  0547 12/02/18  0658 12/01/18  0759 11/30/18  1022   WBC 5.9 6.2 7.4 6.4   RBC 3.22* 3.23* 3.46* 3.50*   HGB 10.7* 10.6* 11.5* 11.6*   HCT 32.6* 32.9* 35.2 35.3   * 102* 102* 101*   MCH 33.2* 32.8 33.2* 33.1*   MCHC 32.8 32.2 32.7 32.9   RDW 13.0 13.2 13.4 13.4    237 239 239     Echocardiogram:  Normal biventricular size and systolic function. The Ejection Fraction is  estimated at 55-60%.  Mild to moderate mitral insufficiency is present.  Mild to moderate tricuspid insufficiency is present.  Estimated pulmonary artery systolic pressure is 26 mmHg plus right atrial  pressure.  Compared to ECHO on 10/9, there are no significant changes. Mitral  regurgitation less prominent compared to recent YOLANDA (10/16).   ---------------    CT chest:  1. Compared to 2/20/2017, stable to slightly progressed basilar and  peripheral predominant fibrotic changes consistent with UIP pattern.  No CT evidence of acute ILD flare.  2. New pulmonary nodules, largest measuring 5 mm in the right lower  lobe compared to 9/11/2017. Follow-up per Fleischner Society criteria.  Multiple additional stable small pulmonary nodules are noted.    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     #Plan for today:    - holding discharge due to presyncope event today  - increase PPM rate to 70 and see if symptoms improve    #Dyspnea on exertion   #HFpEF  #Rheumatoid pulmonary fibrosis   This is likely multifactorial given HFpEF ,  pulmonary fibrosis , and deconditioning. Patient received IV diuresis with improvement in SOB but not complete resolution. Pulmonary medicine evaluated and there is no evidence of pulmonary disease flare or infectious process. CT chest didn't show significant progression of PF. A-fib could also be contributing but less likely given that SOB is present even when she is in a-paced rhythm. She has failed multiple cardioversion in the past, was tried on amiodarone but was discontinued given it failed to suppress AF significantly, and is not a candidate for ablation. Continue rate control for the time being. Her mitral insufficiency is unlikely to be causing her symptoms given that its only mild to moderate MI.     - Currently appears euvolemic on exam near EDW of 154 lbs  - Continue lasix 40/20 mg daily and malathi 12.5 mg daily   - Currently in NSR, continue rate control of a-fib, not a candidate for Ablation given co morbidities per EP notes  - Pulmonology consulted given history of rheumatic pulmonary fibrosis, CT chest with no major changes   - Neurology consulted to assess for possible neurological sources of her symptoms, work up for MG     #Lighheadedness   Presented with lightheadedness/presyncope. Orthostatics negative.  Neuro evaluated for other causes and per assessment likely vasovagal. Has an episode of presyncope this a.m. While getting up from the toilet. Sounded vasovagal. Will attempt to increase pacer rate to 70 and see if symptoms improve.   - Increase pacer rate to 70 BPM  - Compression stocking   - Per Neurology MG work up   - A.fib as above     # AF  - on apixaban for anticoagulation  - with very frequent cardioversions 9/14/18,10/11/18, 10/16/18  - rate controlled   - CHADSVASC4 on eliquis   - per device check has had 4 episodes since 11/17 1min->6 days       #Mild-mod MR  - could benefit from afterload reduction and better bp control; however complicated by presyncope  - Had previously start  isordil/hydral this admission, no holding given presyncopal episode today     #Macrocytic Anemia   Normal B12, MMA pending. Further workup deferred to outpatient setting.     chronic  #Rheumatoid disease with pulmonary involvement on azathioprine   #Hypothyroidism- continue thyroxine        Prophylaxis:  DVT: Apixiban GI: None   Family: Not updated  Disposition: Plan for TCU, pending placement 1-2 days  Code Status: Full     Aubree SUNNI Bai, CNP  [facility] Cardiology Team   [phone]

## 2018-12-03 NOTE — PLAN OF CARE
"Problem: Patient Care Overview  Goal: Plan of Care/Patient Progress Review  Outcome: No Change  /62 (BP Location: Right arm)  Pulse 65  Temp 98  F (36.7  C) (Oral)  Resp 20  Ht 1.613 m (5' 3.5\")  Wt 71.7 kg (158 lb)  LMP  (LMP Unknown)  SpO2 99%  BMI 27.55 kg/m2  Neuro: A&Ox4.   Cardiac: VSS.   Respiratory: RA   GI/: Voiding spontaneously.No BM this shift.   Diet/appetite: Tolerating diet. Denies nausea   Activity: SBA.  Pain: Denies  Skin: Intact, no new deficits noted.  Lines: PIV  Drains: None    Will continue to monitor and follow plan of care.           "

## 2018-12-03 NOTE — PLAN OF CARE
Problem: Patient Care Overview  Goal: Plan of Care/Patient Progress Review  Discharge Planner PT   Patient plan for discharge: Rehab  Current status: Pt more SOB today, reporting some chest heaviness.  SpO2 >95% through out and /70.  Pt ambulated 30' x 2 with use of FWW and CGA, attempted to complete use of nustep but due to labored breathing had to discontinue.    Barriers to return to prior living situation: Need for increased assist with ambulation, weakness and deconditioning, impaired balance, SOB  Recommendations for discharge: TCU  Rationale for recommendations: To progress activity tolerance and safety with mobility       Entered by: Daphne Irwin 12/03/2018 10:40 AM

## 2018-12-03 NOTE — PROGRESS NOTES
"Social Work Services Progress Note    Hospital Day: 5  Date of Initial Social Work Evaluation:  Not yet completed  Collaborated with:  Patient, Pt's son (Arjun), Pt's dtr (Xi), [facility] ([facility]), [facility] (Eddy), [facility] 1 Team, Chart Review    Data:  SW involved for TCU placement at discharge. Per Cards 1 Team, Pt is not stable for discharge today but potentially in 1-2 days.     Intervention:  SW spoke with Pt's son (Arjun) via the phone, and met with Pt and Pt's dtr (Xi) at bedside today. Xi is now the main point of contact for family members at this time. SW discussed discharge planning and TCU placement. Pt and family continue to be in agreement with this recommendation. SW reviewed TCU referrals that have been initiated and clarified Pt/family preference. Pt's preferred TCU would be [facility] at discharge.     Referrals in Process:  1)  TCU ([id number]): Clinically accepted pending bed availability.   2) [facility] (Admissions: [phone], Fax: [phone]): Accepted for admission, however, requesting clarification on:   -When next Orencia injection is scheduled, and can this be put on hold while at TCU?   -Clarification on orders for Hydrocoritisone cream on home medication list.   -Clarification on Estriol on home medication list   -If her \"eye injection drug\" can be put on hold while at TCU?  3) [facility] (Main: [phone], FaxL [phone]): No update from facility today.    Assessment:  Pt and family are in agreement with TCU recommendation at discharge.    Plan:    Anticipated Disposition:  Facility:  TCU (TBD)    Barriers to d/c plan:  Medical stability    Follow Up:  SW to continue to follow and assist with discharge plan.    JAQUELINE Quevedo, [facility]  Acute Care Unit  - Covering for Unit 6C  Phone: [phone]  Pager: [phone]  ________________________________________________________________________________________________________________________________    Referrals Discontinued:  -[facility]: Family discontinued this referral

## 2018-12-03 NOTE — PLAN OF CARE
Problem: Patient Care Overview  Goal: Plan of Care/Patient Progress Review  Afebrile, BP's high, systolic 160's-170's. MD paged. Prn hydralazine available for SBP > 170. OVSS. Tele paced with underlying A-fib. Ambulated in halls before bedtime. SBA with walker. Denies pain. Voiding adequate amounts. Denies nausea. A/Ox4, calling appropriately and making needs known. Slept well overnight. Plan for TCU - placement pending. Will continue to monitor and follow POC.

## 2018-12-03 NOTE — MR AVS SNAPSHOT
After Visit Summary   12/3/2018    Linda Spicer    MRN: [id number]           Patient Information     Date Of Birth          6/13/1931        Visit Information        Provider Department      12/3/2018 6:00 AM UC CV DEVICE 2 [facility]        Today's Diagnoses     Sick sinus syndrome (H)    -  1       Follow-ups after your visit        Your next 10 appointments already scheduled     Dec 04, 2018  9:00 AM CST   Office Visit with PFT LAB   [facility] ([facility])    [address] [place] [id number] [phone]           Bring a current list of meds and any records pertaining to this visit. For Physicals, please bring immunization records and any forms needing to be filled out. Please arrive 10 minutes early to complete paperwork.            Dec 13, 2018  2:00 PM CST   (Arrive by 1:45 PM)   New Patient Visit with José Miguel Mak MD   [facility] ([facility] [facility] [place])    [address] [place] [id number] [phone]            Dec 14, 2018  9:45 AM CST   LAB with BK LAB   [facility] ([facility])    [address] [facility] [id number] [phone]           Please do not eat 10-12 hours before your appointment if you are coming in fasting for labs on lipids, cholesterol, or glucose (sugar). This does not apply to pregnant women. Water, hot tea and black coffee (with nothing added) are okay. Do not drink other fluids, diet soda or chew gum.            Dec 17, 2018  9:30 AM CST   Core Return with Karla Mabry NP   [facility] ([facility])    [address] [facility] [address] [phone]            Dec 19, 2018  3:15 PM CST   Lab with UC LAB   [facility])    [address] [place] [address]  [address]   [phone]            Dec 19, 2018  4:00 PM CST   (Arrive by 3:45 PM)   CARDIAC DEVICE CHECK - IN CLINIC with UC CV DEVICE 1   [facility] ([facility])    [address]  3rd [address]  [facility] [address]   [phone]            Dec 19, 2018  4:30 PM CST   (Arrive by 4:15 PM)   RETURN ARRHYTHMIA with Franki Carrasquillo MD   [facility] ([facility])    [address]  [address]  [facility] [address] [phone]            Dec 27, 2018  9:00 AM CST   Level 1 with BAY  INFUSION   [facility] ([facility])    [id number] [place] [id number] [phone]            Jim 15, 2019  2:40 PM CST   Return Visit with Mario Davenport MD   [facility] ([facility])    [address] [facility] [id number] [phone]            Jan 24, 2019 10:00 AM CST   Level 1 with BAY  INFUSION   [facility] ([facility])    [address] [place] [id number] [phone]              Future tests that were ordered for you today     Open Future Orders        Priority Expected Expires Ordered    Cardiac Device Check - Remote Routine 3/20/2019 12/2/2020 12/2/2018    Cardiac Device Check - In Clinic Routine 12/19/2018 12/2/2020 12/2/2018            Who to contact     If you have questions or need follow up information about today's clinic visit or your schedule please contact [facility] directly at [phone].  Normal or non-critical lab and imaging results will be communicated to you by MyChart, letter or phone within 4 business days after the clinic has received the results. If you do not hear from us within 7 days, please contact the clinic through MyChart or phone. If you have a critical or abnormal lab result, we will notify you by phone as soon as possible.  Submit  refill requests through [employer] or call your pharmacy and they will forward the refill request to us. Please allow 3 business days for your refill to be completed.          Additional Information About Your Visit        "[employer]"hart Information     [employer] gives you secure access to your electronic health record. If you see a primary care provider, you can also send messages to your care team and make appointments. If you have questions, please call your primary care clinic.  If you do not have a primary care provider, please call [phone] and they will assist you.        Care EveryWhere ID     This is your Care EveryWhere ID. This could be used by other organizations to access your [place] medical records  [id number]        Your Vitals Were     Last Period                   (LMP Unknown)            Blood Pressure from Last 3 Encounters:   12/03/18 155/62   11/27/18 131/78   11/26/18 124/79    Weight from Last 3 Encounters:   12/03/18 71.7 kg (158 lb)   11/27/18 73 kg (161 lb)   11/26/18 72.8 kg (160 lb 6.4 oz)              We Performed the Following     (92527)PM DEVICE PROGRAMMING EVAL, DUAL LEAD PACER          Today's Medication Changes      Notice     This visit is during an admission. Changes to the med list made in this visit will be reflected in the After Visit Summary of the admission.             Primary Care Provider Office Phone # Fax #    Tre Lockett [phone]       [address] [facility] [address]        Goals        General    #1 I will complete my health care directive. (pt-stated)     Notes - Note edited  8/21/2018 10:39 AM by Behl, Melissa K, RN    Goal Statement: I will complete my health care directive.  Measure of Success: Health care directive will be completed and scanned into chart.  Supportive Steps to Achieve: Patient has attended a health care directive class, 10/24/17 informed of CPR vs intubation  Barriers: is awaiting to see her  to finalize  document  Strengths: has care coordination, home care and excellent family support  Date to Achieve By: 12/31/18  Patient expressed understanding of goal: yes             #2 Healthy Eating (pt-stated)     Notes - Note created  10/30/2018  3:58 PM by Behl, Melissa K, RN    Goal Statement: I will follow a low sodium diet.  Measure of Success: Patient will consistently eat a low sodium diet.  Supportive Steps to Achieve: RN CC will mail out low sodium diet ideas.  Family providing assistance.  Barriers: not always able to cook for self  Strengths: family support  Date to Achieve By: 12/30/18  Patient expressed understanding of goal: yes           Equal Access to Services     [facility]: Hadii rakesh schuster hadasho Soomaali, waaxda luqadaha, qaybta kaalmada ana, zahraa moss . So [facility] [phone].    ATENCIÓN: Si habla español, tiene a merchant disposición servicios gratuitos de asistencia lingüística. Llame al [phone].    We comply with applicable federal civil rights laws and [place] laws. We do not discriminate on the basis of race, color, national origin, age, disability, sex, sexual orientation, or gender identity.            Thank you!     Thank you for choosing [facility]  for your care. Our goal is always to provide you with excellent care. Hearing back from our patients is one way we can continue to improve our services. Please take a few minutes to complete the written survey that you may receive in the mail after your visit with us. Thank you!             Your Updated Medication List - Protect others around you: Learn how to safely use, store and throw away your medicines at www.disposemymeds.org.      Notice     This visit is during an admission. Changes to the med list made in this visit will be reflected in the After Visit Summary of the admission.

## 2018-12-03 NOTE — PLAN OF CARE
Problem: Patient Care Overview  Goal: Plan of Care/Patient Progress Review  OT/6C: Per discussion with PT, pt continues to be independent/SBA with basic ADLs, mostly limited by weakness, fatigue, and SOB. OT will continue to hold and initiate as appropriate. Refer to PT note for details.

## 2018-12-04 VITALS
OXYGEN SATURATION: 98 % | RESPIRATION RATE: 18 BRPM | BODY MASS INDEX: 26.69 KG/M2 | HEART RATE: 69 BPM | DIASTOLIC BLOOD PRESSURE: 72 MMHG | SYSTOLIC BLOOD PRESSURE: 136 MMHG | TEMPERATURE: 97.5 F | HEIGHT: 64 IN | WEIGHT: 156.3 LBS

## 2018-12-04 PROCEDURE — 99239 HOSP IP/OBS DSCHRG MGMT >30: CPT | Mod: GC | Performed by: INTERNAL MEDICINE

## 2018-12-04 PROCEDURE — A9270 NON-COVERED ITEM OR SERVICE: HCPCS | Mod: GY | Performed by: STUDENT IN AN ORGANIZED HEALTH CARE EDUCATION/TRAINING PROGRAM

## 2018-12-04 PROCEDURE — 25000132 ZZH RX MED GY IP 250 OP 250 PS 637: Mod: GY | Performed by: STUDENT IN AN ORGANIZED HEALTH CARE EDUCATION/TRAINING PROGRAM

## 2018-12-04 RX ORDER — CETIRIZINE HYDROCHLORIDE 10 MG/1
10 TABLET ORAL AT BEDTIME
Qty: 30 TABLET | Status: ON HOLD | DISCHARGE
Start: 2018-12-04 | End: 2019-01-20

## 2018-12-04 RX ORDER — FUROSEMIDE 20 MG
TABLET ORAL
Qty: 120 TABLET | Refills: 3 | DISCHARGE
Start: 2018-12-04 | End: 2018-12-17

## 2018-12-04 RX ORDER — CARBOXYMETHYLCELLULOSE SODIUM 10 MG/ML
1 GEL OPHTHALMIC DAILY PRN
Qty: 1 BOTTLE | Status: ON HOLD | DISCHARGE
Start: 2018-12-04 | End: 2019-01-20

## 2018-12-04 RX ORDER — TRIMETHOPRIM 100 MG/1
50 TABLET ORAL DAILY
Qty: 45 TABLET | Refills: 1 | Status: ON HOLD | DISCHARGE
Start: 2018-12-04 | End: 2019-01-20

## 2018-12-04 RX ORDER — AMOXICILLIN 250 MG
1 CAPSULE ORAL DAILY PRN
Qty: 100 TABLET | Status: ON HOLD | DISCHARGE
Start: 2018-12-04 | End: 2019-01-20

## 2018-12-04 RX ORDER — ASCORBIC ACID 500 MG
500 TABLET ORAL DAILY
Qty: 30 TABLET | Status: ON HOLD | DISCHARGE
Start: 2018-12-04 | End: 2019-01-20

## 2018-12-04 RX ORDER — ALBUTEROL SULFATE 0.83 MG/ML
2.5 SOLUTION RESPIRATORY (INHALATION) EVERY 6 HOURS PRN
Qty: 360 ML | Status: ON HOLD | DISCHARGE
Start: 2018-12-04 | End: 2019-01-20

## 2018-12-04 RX ORDER — NITROGLYCERIN 0.4 MG/1
0.4 TABLET SUBLINGUAL EVERY 5 MIN PRN
Qty: 25 TABLET | Refills: 0 | Status: ON HOLD | DISCHARGE
Start: 2018-12-04 | End: 2019-01-16

## 2018-12-04 RX ORDER — LEVOTHYROXINE SODIUM 75 UG/1
75 TABLET ORAL DAILY
Qty: 90 TABLET | Refills: 1 | Status: ON HOLD | DISCHARGE
Start: 2018-12-04 | End: 2019-01-20

## 2018-12-04 RX ORDER — CARVEDILOL 3.12 MG/1
3.12 TABLET ORAL 2 TIMES DAILY WITH MEALS
Qty: 60 TABLET | Refills: 11 | Status: ON HOLD | DISCHARGE
Start: 2018-12-04 | End: 2019-01-20

## 2018-12-04 RX ORDER — AZATHIOPRINE 50 MG/1
50 TABLET ORAL DAILY
Qty: 90 TABLET | Refills: 2 | Status: ON HOLD | DISCHARGE
Start: 2018-12-04 | End: 2019-01-20

## 2018-12-04 RX ORDER — SPIRONOLACTONE 25 MG/1
12.5 TABLET ORAL DAILY
Qty: 30 TABLET | Refills: 1 | DISCHARGE
Start: 2018-12-04 | End: 2018-12-14

## 2018-12-04 RX ORDER — ACETAMINOPHEN 500 MG
500 TABLET ORAL EVERY 6 HOURS PRN
Status: ON HOLD | DISCHARGE
Start: 2018-12-04 | End: 2019-01-20

## 2018-12-04 RX ORDER — FOLIC ACID 1 MG/1
1 TABLET ORAL DAILY
Qty: 90 TABLET | Refills: 3 | Status: ON HOLD | DISCHARGE
Start: 2018-12-04 | End: 2019-01-20

## 2018-12-04 RX ADMIN — APIXABAN 2.5 MG: 2.5 TABLET, FILM COATED ORAL at 10:23

## 2018-12-04 RX ADMIN — LEVOTHYROXINE SODIUM 75 MCG: 75 TABLET ORAL at 08:23

## 2018-12-04 RX ADMIN — FUROSEMIDE 40 MG: 40 TABLET ORAL at 10:24

## 2018-12-04 RX ADMIN — RANITIDINE 150 MG: 150 TABLET ORAL at 10:35

## 2018-12-04 RX ADMIN — FOLIC ACID 1 MG: 1 TABLET ORAL at 10:23

## 2018-12-04 RX ADMIN — CARVEDILOL 3.12 MG: 3.12 TABLET, FILM COATED ORAL at 10:23

## 2018-12-04 RX ADMIN — Medication 12.5 MG: at 10:25

## 2018-12-04 ASSESSMENT — ACTIVITIES OF DAILY LIVING (ADL)
ADLS_ACUITY_SCORE: 12

## 2018-12-04 NOTE — PLAN OF CARE
Problem: Patient Care Overview  Goal: Plan of Care/Patient Progress Review  Outcome: Improving  Pt admitted for SOB and Fatigue on 11-28-18. Pt has been receiving lasix to help decrease edema, pt does not have edema at this time.  Up with walker and 1 standby assist to the bathroom.  Pt is voiding in good amounts.

## 2018-12-04 NOTE — PLAN OF CARE
Problem: Patient Care Overview  Goal: Plan of Care/Patient Progress Review  Outcome: No Change  D: Fatigue, unspecified type  Near syncope  Light headedness  Atrial flutter, unspecified type (H)  Rheumatoid pulmonary fibrosis.  CKD.  Hypothyroidism.  Admitted for shortness of breath and lightheadedness.    I: Monitored vitals and assessed pt status.   Changed:n/a  Running:n/a  PRN:n/a    A: A0x4 and AVSS, on  RA, RUSSELL per baseline but denies feeling lightheadness after PPM rate was set at 70/b/min.  Up with walker and ambulates to the bathroom with ease.    I/O this shift:  In: -   Out: 200 [Urine:200]    Temp:  [97.5  F (36.4  C)-98.6  F (37  C)] 97.5  F (36.4  C)  Pulse:  [69] 69  Heart Rate:  [59-72] 71  Resp:  [18-22] 18  BP: (135-155)/(59-62) 150/61  SpO2:  [96 %-99 %] 96 %      P: Continue to monitor Pt status and report changes to treatment team.  Possible discharge to TCU in a day or two.

## 2018-12-04 NOTE — PLAN OF CARE
Called [facility] to discuss surgery isn't approved yet and ins is closed Friday Nov 23. I LMM him if he wants to wait it out or reschedule today.    Problem: Patient Care Overview  Goal: Plan of Care/Patient Progress Review  Physical Therapy Discharge Summary    Reason for therapy discharge:    Discharged to transitional care facility.    Progress towards therapy goal(s). See goals on Care Plan in [facility] electronic health record for goal details.  Goals partially met.  Barriers to achieving goals:   discharge from facility.    Therapy recommendation(s):    Continued therapy is recommended.  Rationale/Recommendations:  To progress activity tolerance and safety with mobility.

## 2018-12-04 NOTE — PROGRESS NOTES
Social Work Services Progress Note    Hospital Day: 6  Date of Initial Social Work Evaluation:  Not yet completed  Collaborated with:  Pt, daughter Xi    Data:  Pt is a 87 year old female being followed by SW for discharge planning to [facility].    Intervention:  SW called Xi to update on discharge plans per pt request.  Xi was asking about [facility] as the family was concerned that [facility] do not go out to [facility].  SW also informed Xi that  at this time does not staff [facility] as well.  Xi asked for a referral anyways as the family knows people who have been in the rehab center and had a positive outcome.    [facility] at this time does not have bed availability.  Xi in agreement with discharging to [facility].  Xi will transport pt at 4 pm today.    Referrals in Process:  [facility]  PH: [phone]  F: [phone]    Assessment:  Pt and family are in agreement with TCU recommendation at discharge.    Plan:    Anticipated Disposition:  Facility:  TCU - [facility]    Barriers to d/c plan: None    Follow Up:  SW to follow for discharge.    DENVER Bass, APSW  6C Unit   Phone: [phone]  Pager: [phone]  Unit: [phone]    ___________________________________________________________________________________________________________________________________________________    Referrals Discontinued:  [facility] - family declined  PH: [phone]  F: [phone]    [facility]  [phone]    [facility]  PH: [phone]  F: [phone]    [facility] - [facility] choosing [facility] at discharge  PH: [phone]  F: [phone]    Community Case Management/Community Services in place:   None

## 2018-12-04 NOTE — PROGRESS NOTES
Pt getting transferred to [facility]. Daughter coming to  pt at 1600. Writer called [facility] to give report (i.e. dizziness, RUSSELL, fall precautions, etc), spoke with Lucille.

## 2018-12-04 NOTE — PLAN OF CARE
Problem: Patient Care Overview  Goal: Plan of Care/Patient Progress Review  Outcome: No Change  Pt admit 11/28 with fatigue + SOB. Pt A/O. See flowsheets for VS. Paced. Denies pain. RUSSELL. Pt endorsing dizziness, dizziness at rest reported this AM (Cards 1 updated with this). Pt endorsing dizziness is improving, but is still present. PIV removed, okayed to not have an IV in place (see pt care order). Pleasant, makes needs known. Continue with plan of care and report changes to treatment team. Plan for d/ c to TCU upon placement.     Writer spoke with Cards 1 that orthostatics were not done today, okayed by Cards 1.

## 2018-12-04 NOTE — DISCHARGE SUMMARY
[facility] Discharge Summary- Cardiology     Linda Spicer MRN# [id number]   Age: 87 year old YOB: 1931     Date of Admission:  11/28/2018  Date of Discharge::  12/4/2018  Admitting Physician:  Quinn Nieves MD  Discharge Physician:  Peggy Ramos MD          Admission Diagnoses:   Near syncope [R55]  Light headedness [R42]  Fatigue, unspecified type [R53.83]  Atrial flutter, unspecified type (H) [I48.92]          Discharge Diagnosis:   Dyspnea on exertion  Chronic heart failure with preserved ejection fracture   Rheumatoid pulmonary fibrosis  Light headedness   Near syncope  Atrial fibrillation, on anticoagulation   Mild to moderate mitral regurgitation           Medications Prior to Admission:     Prescriptions Prior to Admission   Medication Sig Dispense Refill Last Dose     denosumab (PROLIA) 60 MG/ML SOLN injection Inject 60 mg Subcutaneous every 6 months    11/26/2018 at few days ago     fish oil-omega-3 fatty acids (FISH OIL) 1000 MG capsule Take 1 g by mouth 2 times daily    11/28/2018 at am     Blood Pressure Monitor KIT 1 kit daily as needed 1 kit 0 Unknown at Unknown time     order for DME Dispense SP Walker-small 1 each 0 Unknown at Unknown time     order for DME Equipment being ordered: Dispense baffle, for use with nebulizer. 1 each 0 Unknown at Unknown time     order for DME Equipment being ordered: Nebulizer. Use with Albuterol. 1 each 0 Unknown at Unknown time     order for DME Equipment being ordered: Dispense face mask.  Mrs. Spicer is immunosuppressed due to rheumatoid arthritis. 1 Box 11 Unknown at Unknown time     ranibizumab (LUCENTIS) 0.3 MG/0.05ML SOLN 0.3 mg by Intravitreal route Every 6 weeks   Unknown at Unknown time     [DISCONTINUED] COMPOUNDED NON-CONTROLLED SUBSTANCE (CMPD RX) - PHARMACY TO MIX COMPOUNDED MEDICATION Estriol 1mg/gram. Place 1 gram vaginally daily for two weeks, then vaginally twice weekly after. 30 g 6 Past Week at few days ago      [DISCONTINUED] hydrocortisone 2.5 % cream Apply topically 2 times daily as needed   prn at prn     [DISCONTINUED] Multiple Vitamins-Minerals (PRESERVISION AREDS PO) Take 1 tablet by mouth 2 times daily    11/28/2018 at am     [DISCONTINUED] ranitidine (ZANTAC) 150 MG tablet Take 150 mg by mouth At Bedtime   11/27/2018 at pm     [DISCONTINUED] saccharomyces boulardii (FLORASTOR) 250 MG capsule Take 250 mg by mouth daily   11/28/2018 at afternoon     [DISCONTINUED] triamcinolone (KENALOG) 0.025 % cream Apply topically daily as needed for irritation   prn at prn             Discharge Medications:     Current Discharge Medication List      CONTINUE these medications which have CHANGED    Details   abatacept (ORENCIA) 250 MG injection Inject 750 mg into the vein every 28 days  Qty: 3 each, Refills: 0    Comments: Last dose reportedly on 11/26. Will likely be due for next dose on 12/24. Please discuss with outpatient rheumatology provider.  Associated Diagnoses: Rheumatoid arthritis involving multiple sites with positive rheumatoid factor (H)      acetaminophen (TYLENOL) 500 MG tablet Take 1 tablet (500 mg) by mouth every 6 hours as needed for mild pain or fever    Associated Diagnoses: Rheumatoid arthritis involving multiple sites with positive rheumatoid factor (H)      albuterol (PROVENTIL) (2.5 MG/3ML) 0.083% neb solution Take 1 vial (2.5 mg) by nebulization every 6 hours as needed for shortness of breath / dyspnea or wheezing  Qty: 360 mL    Associated Diagnoses: ILD (interstitial lung disease) (H)      apixaban ANTICOAGULANT (ELIQUIS) 2.5 MG tablet Take 1 tablet (2.5 mg) by mouth 2 times daily  Qty: 180 tablet, Refills: 3    Associated Diagnoses: Atrial flutter, paroxysmal (H)      azaTHIOprine (IMURAN) 50 MG tablet Take 1 tablet (50 mg) by mouth daily  Qty: 90 tablet, Refills: 2    Associated Diagnoses: Rheumatoid arthritis involving multiple sites with positive rheumatoid factor (H)      calcium carbonate-vitamin  D (OS-FATOUMATA) 500-400 MG-UNIT tablet Take 1 tablet by mouth daily  Qty: 60 tablet    Associated Diagnoses: Rheumatoid arthritis involving multiple sites with positive rheumatoid factor (H)      Carboxymethylcellulose Sod PF (CELLUVISC/REFRESH LIQUIGEL) 1 % ophthalmic gel Place 1 drop into both eyes daily as needed for dry eyes  Qty: 1 Bottle    Associated Diagnoses: Dry eyes      carvedilol (COREG) 3.125 MG tablet Take 1 tablet (3.125 mg) by mouth 2 times daily (with meals)  Qty: 60 tablet, Refills: 11    Comments: Hold if SBP<90 or HR<60 and discuss with MD  Associated Diagnoses: Heart failure with preserved ejection fraction, NYHA class I (H); Benign essential hypertension      cetirizine (ZYRTEC ALLERGY) 10 MG tablet Take 1 tablet (10 mg) by mouth At Bedtime  Qty: 30 tablet    Associated Diagnoses: Seasonal allergic rhinitis due to other allergic trigger      folic acid (FOLVITE) 1 MG tablet Take 1 tablet (1 mg) by mouth daily  Qty: 90 tablet, Refills: 3    Associated Diagnoses: Oral aphthae      furosemide (LASIX) 20 MG tablet Take 40mg in the AM and 20mg in the PM  Qty: 120 tablet, Refills: 3    Associated Diagnoses: Heart failure with preserved ejection fraction, NYHA class I (H)      hypromellose (GENTEAL) 0.3 % SOLN ophthalmic solution Place 1 drop into both eyes daily as needed for dry eyes    Associated Diagnoses: Dry eyes      levothyroxine (SYNTHROID/LEVOTHROID) 75 MCG tablet Take 1 tablet (75 mcg) by mouth daily  Qty: 90 tablet, Refills: 1    Associated Diagnoses: Hypothyroidism, unspecified type      ranitidine (ZANTAC) 150 MG tablet Take 1 tablet (150 mg) by mouth 2 times daily  Qty: 60 tablet    Associated Diagnoses: Gastroesophageal reflux disease without esophagitis      senna-docusate (SENOKOT-S/PERICOLACE) 8.6-50 MG tablet Take 1 tablet by mouth daily as needed for constipation  Qty: 100 tablet    Associated Diagnoses: Irritable bowel syndrome, unspecified type      spironolactone (ALDACTONE) 25 MG  tablet Take 0.5 tablets (12.5 mg) by mouth daily  Qty: 30 tablet, Refills: 1    Associated Diagnoses: Heart failure with preserved ejection fraction, NYHA class I (H)      trimethoprim (TRIMPEX) 100 MG tablet Take 0.5 tablets (50 mg) by mouth daily  Qty: 45 tablet, Refills: 1    Associated Diagnoses: Recurrent UTI      vitamin C (ASCORBIC ACID) 500 MG tablet Take 1 tablet (500 mg) by mouth daily  Qty: 30 tablet    Associated Diagnoses: Vitamin deficiency      nitroGLYcerin (NITROSTAT) 0.4 MG sublingual tablet Place 1 tablet (0.4 mg) under the tongue every 5 minutes as needed for chest pain  Qty: 25 tablet, Refills: 0    Comments: Page MD if use is required  Associated Diagnoses: Acute chest pain         CONTINUE these medications which have NOT CHANGED    Details   denosumab (PROLIA) 60 MG/ML SOLN injection Inject 60 mg Subcutaneous every 6 months       fish oil-omega-3 fatty acids (FISH OIL) 1000 MG capsule Take 1 g by mouth 2 times daily       Blood Pressure Monitor KIT 1 kit daily as needed  Qty: 1 kit, Refills: 0    Associated Diagnoses: CHF (congestive heart failure) (H)      !! order for DME Dispense SP Walker-small  Qty: 1 each, Refills: 0    Associated Diagnoses: Pain of toe of right foot; Status post bunionectomy      !! order for DME Equipment being ordered: Dispense baffle, for use with nebulizer.  Qty: 1 each, Refills: 0    Associated Diagnoses: Pneumonia of left upper lobe due to Mycoplasma pneumoniae      !! order for DME Equipment being ordered: Nebulizer. Use with Albuterol.  Qty: 1 each, Refills: 0    Associated Diagnoses: Hypoxia      !! order for DME Equipment being ordered: Dispense face mask.  Mrs. Spicer is immunosuppressed due to rheumatoid arthritis.  Qty: 1 Box, Refills: 11    Associated Diagnoses: Rheumatoid arthritis involving left wrist with positive rheumatoid factor (H)      ranibizumab (LUCENTIS) 0.3 MG/0.05ML SOLN 0.3 mg by Intravitreal route Every 6 weeks       !! - Potential  duplicate medications found. Please discuss with provider.      STOP taking these medications       COMPOUNDED NON-CONTROLLED SUBSTANCE (CMPD RX) - PHARMACY TO MIX COMPOUNDED MEDICATION Comments:   Reason for Stopping:         hydrocortisone 2.5 % cream Comments:   Reason for Stopping:         Multiple Vitamins-Minerals (PRESERVISION AREDS PO) Comments:   Reason for Stopping:         saccharomyces boulardii (FLORASTOR) 250 MG capsule Comments:   Reason for Stopping:         triamcinolone (KENALOG) 0.025 % cream Comments:   Reason for Stopping:                    Consultations:     Consultation during this admission received from neurology and pulmonary medicine          Brief History of Illness:     Linda Spicer is an 88 y/o F with history of HFpEF, paroxysmal Afib/Aflutter (on apixaban) s/p multiple failed cardioversions, tachy lisa s/p PPM (2/17), rheumatoid pulmonary fibrosis, CKD, and hypothyroidism who presented with fatigue and shortness of breath. Please see admission H&P for additional information.           Hospital Course:     #Acute-on-chronic diastolic heart failure:  Dyspnea likely multifactorial in the setting of known HFpEF, pulmonary fibrosis, atrial fibrillation and deconditioning. Symptoms improved somewhat s/p diuresis. Now clinically euvolemic on exam near .  lbs. Currently in NSR, continuing rate control for Afib (not candidate for ablation given co-morbidities per EP notes).   - Pulmonology consulted: Given stable CT chest, it is unlikely that pulmonary fibrosis has progressed or flared, although she has limited pulmonary reserves. Plan for follow up with Dr. Perlman in clinic.   - Neurology consulted: MG workup in process. Follow up with general neurology in clinic (new referral).   - Diuresis:continue lasix 40mg/20mg  - Ald ant: continue spironalactone 12.5mg daily      # Lightheadedness  Felt to be secondary to vasovagal etiolgoy vs Afib vs orthostatic (although orthostatic vitals  negative). Afib discussed below.  - PPM rate increased to 70bpm on 12/3 without improvement in symptoms  - Neurology consulted. Symptoms are likely secondary to a combination of pulmonary and cardiac factors.  MG workup ongoing. Follow up with neurology in clinic for additional workup and evaluation.  - Fall precautions, compression stockings recommended.  - PT/OT recommended  - Assistance with ambulation as needed      # Atrial fibrillation   CHADsVASC 4, on apixaban. History of multiple cardioversions 9/14/18,10/11/18, 10/16/18. Has previously tried amiodarone but discontinued due to failure to significantly suppress AF. Not an ablation candidate. May be contributing to dyspnea discussed above. Per device check, has had 4 episodes since 11/17 ranging from 1 minute to 6 days. Continue to follow with cardiology as scheduled.      # Mild to moderate MR  May benefit from afterload reduction and improved BP control; however, this has been complicated by presyncope. Previously started isodil/hyral this admission, now holding given recurrent pre-syncopal episodes.  - Recommend close outpatient follow up with cardiology for consideration of afterload reduction if pre-syncopal events resolve      # Macrocytic anemia   Normal B12, MMA pending. No active bleeding. Recommend ongoing workup as outpatient.      # Rheumatoid disease   On azathioprine PTA, continued during admission. Also on abatacept q28 days continue per rheumatology.      # Hypothyroidism   - Continue levothyroxine           Discharge Instructions and Follow-Up:   Discharge diet: 2g salt   Discharge activity: Activity as tolerated. Ambulate with assistance.    Discharge follow-up: Follow up with TCU provider within one week. Follow up with Nursing home physician. Follow up with Cardiology (Dr. Carrasquillo) as scheduled on 12/19 or within one month of discharge. Follow up with Neurology (new referral) in 1-2 months. Follow up with Pulmonology (Dr. Perlman) within 1  month. Follow up with Rheumatology as scheduled.           Discharge Disposition:   Discharged to [facility]      Seen and discussed with Dr. Littlejohn, staff physician.     Peggy Ramos MD   Internal Medicine, PGY2  [phone]     Date of Service 12/4/18  I have seen and examined the patient with the house staff on 12/4/18 and agree with the above outlined hospital course and discharge plan.      Greater than 30 minutes were spent in discharge planning between patient education, medication teaching, and in the coordination of care to outpatient providers.    Talat Littlejohn MD  Cardiology

## 2018-12-04 NOTE — PROGRESS NOTES
Social Work Services Discharge Note      Patient Name:  Linda Spicer     Anticipated Discharge Date: 12/04/18 @ 1600    Discharge Disposition:   Facility: [facility]  PH: [phone]  F: [phone]  Nurse to Nurse Call: PH: [phone]    Following MD: Tre Lockett  [address] / [place] [facility] [address]     Pre-Admission Screening (PAS) online form has been completed.  The Level of Care (LOC) is:  Determined  Confirmation Code is:  [id number]  Patient/caregiver informed of referral to [facility] Line for Pre-Admission Screening for skilled nursing facility (SNF) placement and to expect a phone call post discharge from SNF.     Additional Services/Equipment Arranged:    - Transportation: Family - Daughter Xi (PH: [phone]) to transport pt at 1600.     Patient / Family response to discharge plan:  Pt and family in agreement with the plan.     Persons notified of above discharge plan:  Pt, Family, bedside RN, Cards 1 team, PCP, SNF admissions.    Staff Discharge Instructions:  Please fax discharge orders and signed hard scripts for any controlled substances.  Please print a packet and send with patient.     CTS Handoff completed:  YES    Medicare Notice of Rights provided to the patient/family:  YES    DENVER Bass, APSW  [address] Unit   Phone: [phone]  Pager: [phone]  Unit: [phone]

## 2018-12-05 ENCOUNTER — TELEPHONE (OUTPATIENT)
Dept: PHARMACY | Facility: OTHER | Age: 83
End: 2018-12-05

## 2018-12-05 ENCOUNTER — TELEPHONE (OUTPATIENT)
Dept: CARDIOLOGY | Facility: CLINIC | Age: 83
End: 2018-12-05

## 2018-12-05 ENCOUNTER — PATIENT OUTREACH (OUTPATIENT)
Dept: CARE COORDINATION | Facility: CLINIC | Age: 83
End: 2018-12-05

## 2018-12-05 LAB
ACHR BLOCK AB/ACHR TOTAL SFR SER: 15 % (ref 0–26)
ACHR MOD AB/ACHR TOTAL SFR SER: 10 %
METHYLMALONATE SERPL-SCNC: 0.25 UMOL/L (ref 0–0.4)

## 2018-12-05 ASSESSMENT — ACTIVITIES OF DAILY LIVING (ADL): DEPENDENT_IADLS:: COOKING;CLEANING;LAUNDRY;SHOPPING;MEDICATION MANAGEMENT;MONEY MANAGEMENT;TRANSPORTATION

## 2018-12-05 NOTE — TELEPHONE ENCOUNTER
[facility] Call Center    Phone Message    May a detailed message be left on voicemail: yes    Reason for Call: Other: patient was discharged from the hospital yesterday and has questions about mulitple medications changes that were made to mothers RX's.      Action Taken: Message routed to:  [facility] Heart      I called Arjun patient's son. I did not see this patient in the hospital so can not speak to changes made on discharge.  Her son could call [phone], press option #4 and ask to speak to the on-call Cardiologist. I think this will connect him to the cardiology fellow on call. He states that he will try this.     Minna Gallahger CNP

## 2018-12-05 NOTE — TELEPHONE ENCOUNTER
MTM referral from: Transitions of Care (recent hospital discharge or ED visit)    MTM referral outreach attempt #1 on December 5, 2018 at 2:39 PM      Outcome: Patient is not interested at this time because she was discharged to a rehab facility, will route to MTM Pharmacist/Provider as an FYI. Thank you for the referral.     Samuel Lenz, MTM Coordinator

## 2018-12-05 NOTE — PROGRESS NOTES
Clinic Care Coordination Contact  Care Coordination Transition Communication    Referral Source: IP Report    Clinical Data: Patient was hospitalized at [facility] from 11/28/18 to 12/4/18 with diagnosis near syncope, light headedness, fatigue, atrial flutter.     Transition to Facility:              Facility Name: [facility]              Contact name and phone number/fax: Elizabeth SANCHEZ [phone]    Plan: RN/SW Care Coordinator will await notification from facility staff informing RN/SW Care Coordinator of patient's discharge plans/needs. RN/SW Care Coordinator will review chart and outreach to facility staff every 4 weeks and as needed.     Melissa Behl BSN, RN, PHN  [facility]3] Care Coordinator  [phone]

## 2018-12-06 ENCOUNTER — CARE COORDINATION (OUTPATIENT)
Dept: CARDIOLOGY | Facility: CLINIC | Age: 83
End: 2018-12-06

## 2018-12-06 DIAGNOSIS — I50.30 (HFPEF) HEART FAILURE WITH PRESERVED EJECTION FRACTION (H): Primary | ICD-10-CM

## 2018-12-06 LAB
ACHR BIND AB SER-SCNC: 0 NMOL/L (ref 0–0.4)
STRIA MUS IGG SER QL IF: NORMAL

## 2018-12-06 RX ORDER — VIT A/VIT C/VIT E/ZINC/COPPER 4296-226
1 CAPSULE ORAL 2 TIMES DAILY
Qty: 30 CAPSULE | Status: ON HOLD
Start: 2018-12-06 | End: 2019-01-20

## 2018-12-06 RX ORDER — SACCHAROMYCES BOULARDII 250 MG
250 CAPSULE ORAL DAILY
Qty: 14 CAPSULE | Status: ON HOLD
Start: 2018-12-06 | End: 2019-01-20

## 2018-12-06 NOTE — PROGRESS NOTES
Pt adm 11/28-12/4/18:   Dyspnea on exertion  Chronic heart failure with preserved ejection fracture   Rheumatoid pulmonary fibrosis  Light headedness   Near syncope  Atrial fibrillation, on anticoagulation   Mild to moderate mitral regurgitation       Spoke to son this am. She is in transitional care at this time.   Clarified discontinued meds with on call cardiologist last evening and would like medication list updated per discontinue summary. EMR updated.    Reviewed follow up visits. Verbalized understanding. Stated they use my chart and will make sure they check location of appts.   No further questions

## 2018-12-06 NOTE — TELEPHONE ENCOUNTER
[facility] Call Center    Phone Message    May a detailed message be left on voicemail: yes    Reason for Call: Other: Pt's son called to follow up regarding the medication changes that were made when the pt was discharged from the hospital. He said that he spoke with someone regarding these medications and found out that some of them had been discontinued by mistake, and he just wanted to make sure all the notes from that call had gotten put into the pt's chart. Please give him a call back if there need to be any changes to the pt's med list.     Action Taken: Message routed to:  [facility] Cardiology

## 2018-12-10 ENCOUNTER — PRE VISIT (OUTPATIENT)
Dept: CARDIOLOGY | Facility: CLINIC | Age: 83
End: 2018-12-10

## 2018-12-12 ENCOUNTER — PATIENT OUTREACH (OUTPATIENT)
Dept: CARE COORDINATION | Facility: CLINIC | Age: 83
End: 2018-12-12

## 2018-12-12 ENCOUNTER — TELEPHONE (OUTPATIENT)
Dept: INTERNAL MEDICINE | Facility: CLINIC | Age: 83
End: 2018-12-12

## 2018-12-12 ENCOUNTER — MEDICAL CORRESPONDENCE (OUTPATIENT)
Dept: HEALTH INFORMATION MANAGEMENT | Facility: CLINIC | Age: 83
End: 2018-12-12

## 2018-12-12 ASSESSMENT — ACTIVITIES OF DAILY LIVING (ADL): DEPENDENT_IADLS:: COOKING;CLEANING;LAUNDRY;SHOPPING;MEDICATION MANAGEMENT;MONEY MANAGEMENT;TRANSPORTATION

## 2018-12-12 NOTE — TELEPHONE ENCOUNTER
1st attempt to reach Xi(daughter) to schedule an appointment with either Dr. Reyes, Dr. Cr or Dr. Skaggs per dizziness, lightheadedness; hospital referral. Did message Karrie to enter the neurology referral; lvm for Xi.    Misti Sprague  [facility]   [phone]

## 2018-12-12 NOTE — PROGRESS NOTES
Clinic Care Coordination Contact  Care Team Conversations    Patient called RN CC to inquire what the call was regarding 12/11/18.  RN CC reviewed the following with patient from 12/11/18 telephone encounter:    For high-risk patients taking immunosuppressants (in her case, she receives Orencia) , I recommend [id number] every 6 years.     Last dose of [id number] was given last October 4, 2010.  Standing orders for [id number] done.     [id number] given last 4/5/2016 (one-time dose only).      Patient verbalized understanding.  Patient requested a call back at a later time, as she had another call coming in.    Plan:  RN CC will follow up with patient in 1-2 business days.    Melissa Behl BSN, RN, N  [facility] Care Coordinator  [phone]

## 2018-12-13 ENCOUNTER — PRE VISIT (OUTPATIENT)
Dept: CARDIOLOGY | Facility: CLINIC | Age: 83
End: 2018-12-13

## 2018-12-13 ASSESSMENT — ACTIVITIES OF DAILY LIVING (ADL): DEPENDENT_IADLS:: COOKING;CLEANING;LAUNDRY;SHOPPING;MEDICATION MANAGEMENT;MONEY MANAGEMENT;TRANSPORTATION

## 2018-12-13 NOTE — PROGRESS NOTES
Clinic Care Coordination Contact    Clinic Care Coordination Contact  OUTREACH    Referral Information:  Referral Source: IP Report    Primary Diagnosis: CHF    Chief Complaint   Patient presents with     Clinic Care Coordination - Follow-up     RN TCU discharge        Universal Utilization: Patient is followed by cardiology, rheumatology, endocrinology, urology and pulmonology  Clinic Utilization  Difficulty keeping appointments:: No  Compliance Concerns: No  No-Show Concerns: No  No PCP office visit in Past Year: No  Utilization    Last refreshed: 12/12/2018  5:49 PM:  Hospital Admissions 3           Last refreshed: 12/12/2018  5:49 PM:  ED Visits 0           Last refreshed: 12/12/2018  5:49 PM:  No Show Count (past year) 1              Current as of: 12/12/2018  5:49 PM              Clinical Concerns:  Current Medical Concerns:  Patient was discharged home from [facility] earlier this week following a hospital stay at [facility] 11/28/18-12/4/18 for near syncope, light headedness, fatigue and atrial flutter.  She states she thought she was discharged home with home physical therapy and had declined home health aide services.  Patient states she would like bathing help at this time, as her daughter Xi is helping her but she would like if someone else could do it.  RN CC spoke with [facility] to determine if patient was reopened following her TCU stay.  [facility] informed writer that no orders for home care were received.  BRANDI FRANCIS spoke with Elizabeth SANCHEZ at [facility] who informed writer that patient declined home care services upon discharge and only wanted outpatient physical therapy at her [facility].    RN CC informed patient of the above information and she stated she was aware physical therapy was outpatient and incorrectly informed writer that this was home care.  Patient states she was hoping to receive bathing help so her daughter Xi did not have to do it.  RN CC explained that home  bathing help would then need to be private pay if she was not receiving skilled home care services.  Patient states she will discuss with her daughter Xi, and thinks her daughter Xi will likely continue to perform her bathing.  Patient will contact writer if she decides to pursue home care for bathing assistance.  Patient reviewed with writer her upcoming appointments and states her children are managing all of her appointments for her at this time.  Patient states she will schedule a f/u with PCP within 1-2 weeks.     Patient Active Problem List   Diagnosis     Hypertension goal BP (blood pressure) < 150/90     Hypothyroidism     Macular degeneration, left eye     Irritable bowel syndrome     Encounter for palliative care     Adjustment disorder with anxious mood     Mild anemia     DDD (degenerative disc disease), lumbar     CKD (chronic kidney disease) stage 3, GFR 30-59 ml/min (H)     History of blood transfusion     Aftercare following surgery     S/P lumbar laminectomy     High risk medication use     Age-related osteoporosis without current pathological fracture     Atrophic vaginitis     Fecal incontinence     Female stress incontinence     Impingement syndrome of both shoulders     UIP (usual interstitial pneumonitis) (H)     High risk medications (not anticoagulants) long-term use     Heart failure with preserved ejection fraction (H)     Congestive heart failure with preserved LV function, NYHA class 3 (H)     Other specified hypothyroidism     Rheumatoid arthritis involving multiple sites with positive rheumatoid factor (H)     Health Care Home     Sick sinus syndrome (H)     Cardiac pacemaker - Medtronic dual lead pacemaker - Not Dependent - MRI Safe     Immunosuppression (H)     ILD (interstitial lung disease) (H)     Heart failure with preserved ejection fraction, NYHA class I (H)     Atrial flutter (H)     Paroxysmal atrial fibrillation (H)     CHF exacerbation (H)     Pre-syncope     Heart failure  (H)      Current Behavioral Concerns: n/a      Education Provided to patient: RN CC reviewed importance of follow up with PCP following TCU discharge.   Pain  Pain (GOAL):: No  Health Maintenance Reviewed: Due/Overdue   Health Maintenance Due   Topic Date Due     ZOSTER IMMUNIZATION (1 of 2) 06/13/1981     DTAP/TDAP/TD IMMUNIZATION (3 - Td) 03/11/2018     MICROALBUMIN Q1 YEAR  07/26/2018      Clinical Pathway: None    Medication Management:  Patient's children are assisting patient with medication management, setting up pill boxes and reminders to take medications.     Functional Status:  Dependent ADLs:: Bathing, Ambulation-walker  Dependent IADLs:: Cooking, Cleaning, Laundry, Shopping, Medication Management, Money Management, Transportation  Bed or wheelchair confined:: No  Mobility Status: Independent w/Device    Living Situation:  Current living arrangement:: I live alone  Type of residence:: Independent Senior Living    Diet/Exercise/Sleep:  Diet:: Low cholesterol, Low saturated fat, No added salt  Inadequate nutrition (GOAL):: No  Food Insecurity: No  Tube Feeding: No  Exercise:: Currently not exercising  Inadequate activity/exercise (GOAL):: No  Significant changes in sleep pattern (GOAL): No    Transportation:  Transportation concerns (GOAL):: No  Transportation means:: Family, [place] mobility     Psychosocial:  Pentecostalism or spiritual beliefs that impact treatment:: No  Mental health DX:: No  Mental health management concern (GOAL):: No  Informal Support system:: [place] based, Family, Friends, Neighbors, Spouse     Financial/Insurance:   Financial/Insurance concerns (GOAL):: No       Resources and Interventions:  Current Resources:    ;   Community Resources: None  Supplies used at home:: None  Equipment Currently Used at Home: raised toilet, shower chair, walker, rolling, wheelchair, power    Advance Care Plan/Directive  Advanced Care Plans/Directives on file:: In process  Advanced Care Plan/Directive Status:  In Process    Referrals Placed: None     Goals: none set at this time        Patient/Caregiver understanding: Patient has a fair understanding of her current plan of care, but needs ongoing care coordination due to complex medical diagnoses.    Outreach Frequency: 2 weeks  Future Appointments              Today José Miguel Mak MD [facility], [facility]    Tomorrow BK LAB [facility], [facility]    In 4 days Karla Mabry, NP [facility] [place] [facility], [facility]    In 6 days [facility] LAB  [facility]    In 6 days UC CV DEVICE [address] [place] [address] Cardiac Services, [facility]    In 6 days Franki Carrasquillo MD [facility], [facility]    In 2 weeks [address]    In 3 weeks Gokul Cr MD [facility]    In 1 month Mario Davenport MD [facility], [facility]    In 1 month [address]    In 2 months Asia Steven PA-C [facility]    In 2 months Asia Steven PA-C [facility]    In 3 months UC ICD REMOTE [facility]          Plan:   1. Patient will continue to follow treatment plan as directed and follow up with PCP with concerns ongoing.   2. Patient will schedule a follow up with Dr. Lockett within 1-2 weeks.  3. RN CC will follow up with patient in 2-3 weeks.    Melissa Behl BSN, RN, N  [facility] Care Coordinator  [phone]

## 2018-12-14 ENCOUNTER — CARE COORDINATION (OUTPATIENT)
Dept: CARDIOLOGY | Facility: CLINIC | Age: 83
End: 2018-12-14

## 2018-12-14 ENCOUNTER — PRE VISIT (OUTPATIENT)
Dept: CARDIOLOGY | Facility: CLINIC | Age: 83
End: 2018-12-14

## 2018-12-14 DIAGNOSIS — I50.30 HEART FAILURE WITH PRESERVED EJECTION FRACTION (H): Primary | ICD-10-CM

## 2018-12-14 DIAGNOSIS — I50.30 CONGESTIVE HEART FAILURE WITH PRESERVED LV FUNCTION, NYHA CLASS 3 (H): ICD-10-CM

## 2018-12-14 LAB
ANION GAP SERPL CALCULATED.3IONS-SCNC: 9 MMOL/L (ref 3–14)
BUN SERPL-MCNC: 48 MG/DL (ref 7–30)
CALCIUM SERPL-MCNC: 9.1 MG/DL (ref 8.5–10.1)
CHLORIDE SERPL-SCNC: 100 MMOL/L (ref 94–109)
CO2 SERPL-SCNC: 27 MMOL/L (ref 20–32)
CREAT SERPL-MCNC: 2.18 MG/DL (ref 0.52–1.04)
GFR SERPL CREATININE-BSD FRML MDRD: 21 ML/MIN/1.7M2
GLUCOSE SERPL-MCNC: 152 MG/DL (ref 70–99)
POTASSIUM SERPL-SCNC: 4.1 MMOL/L (ref 3.4–5.3)
SODIUM SERPL-SCNC: 136 MMOL/L (ref 133–144)

## 2018-12-14 PROCEDURE — 80048 BASIC METABOLIC PNL TOTAL CA: CPT | Performed by: NURSE PRACTITIONER

## 2018-12-14 PROCEDURE — 36415 COLL VENOUS BLD VENIPUNCTURE: CPT | Performed by: NURSE PRACTITIONER

## 2018-12-14 NOTE — PROGRESS NOTES
Called Linda to check in as kidney function worse on labs. She states she has not been doing well. Feels short of breath even at rest. Weight has been stable. Hospital discharge weight was 155 lbs. today's weight was 153.5. She states no swelling in her legs, even without the wraps. Main complaint is SOB. She was audibly SOB talking on the phone. Was supposed to follow up with Dr Mak for workup of valve surgery yesterday but it had to be rescheduled. She states this shortness of breath has been ongoing pretty much since left hospital. Has had a few good days otherwise SOB persists. .    I gave following order and instructed her if breathing gets worse she should present to the ER. Otherwise follow up with Karla as scheduled on Monday and I have added on a lab appt for afterwards to recheck labs.     Lizett Zapata RN     Date: 12/14/2018    Time of Call: 2:13 PM     Diagnosis:  Heart failure     [ VORB ] Ordering provider: Karla Mabry NP  Order: NEGRA Monday Dec 17th. Stop Spironolactone.      Order received by: Lizett Zapata RN      Follow-up/additional notes:

## 2018-12-16 PROBLEM — I50.30 (HFPEF) HEART FAILURE WITH PRESERVED EJECTION FRACTION (H): Status: ACTIVE | Noted: 2018-11-28

## 2018-12-17 ENCOUNTER — OFFICE VISIT (OUTPATIENT)
Dept: CARDIOLOGY | Facility: CLINIC | Age: 83
End: 2018-12-17
Payer: MEDICARE

## 2018-12-17 VITALS
BODY MASS INDEX: 27.72 KG/M2 | SYSTOLIC BLOOD PRESSURE: 130 MMHG | WEIGHT: 159 LBS | HEART RATE: 72 BPM | DIASTOLIC BLOOD PRESSURE: 82 MMHG | OXYGEN SATURATION: 99 %

## 2018-12-17 DIAGNOSIS — I10 ESSENTIAL HYPERTENSION: ICD-10-CM

## 2018-12-17 DIAGNOSIS — I34.0 MITRAL VALVE INSUFFICIENCY, UNSPECIFIED ETIOLOGY: ICD-10-CM

## 2018-12-17 DIAGNOSIS — I49.5 TACHY-BRADY SYNDROME (H): ICD-10-CM

## 2018-12-17 DIAGNOSIS — Z71.89 ADVANCED CARE PLANNING/COUNSELING DISCUSSION: ICD-10-CM

## 2018-12-17 DIAGNOSIS — I48.91 ATRIAL FIBRILLATION, UNSPECIFIED TYPE (H): ICD-10-CM

## 2018-12-17 DIAGNOSIS — I50.30 (HFPEF) HEART FAILURE WITH PRESERVED EJECTION FRACTION (H): ICD-10-CM

## 2018-12-17 DIAGNOSIS — N17.9 ACUTE RENAL FAILURE, UNSPECIFIED ACUTE RENAL FAILURE TYPE (H): Primary | ICD-10-CM

## 2018-12-17 PROCEDURE — 99214 OFFICE O/P EST MOD 30 MIN: CPT | Performed by: NURSE PRACTITIONER

## 2018-12-17 RX ORDER — FUROSEMIDE 20 MG
TABLET ORAL
Qty: 90 TABLET | Refills: 3 | Status: SHIPPED | OUTPATIENT
Start: 2018-12-17 | End: 2018-12-19

## 2018-12-17 SDOH — HEALTH STABILITY: PHYSICAL HEALTH: ON AVERAGE, HOW MANY DAYS PER WEEK DO YOU ENGAGE IN MODERATE TO STRENUOUS EXERCISE (LIKE A BRISK WALK)?: 0 DAYS

## 2018-12-17 SDOH — HEALTH STABILITY: PHYSICAL HEALTH: ON AVERAGE, HOW MANY MINUTES DO YOU ENGAGE IN EXERCISE AT THIS LEVEL?: 0 MIN

## 2018-12-17 NOTE — PATIENT INSTRUCTIONS
Cardiology Providers you saw during your visit:  Karla Mabry NP    Results for RG WALTER (MRN [id number]) as of 12/17/2018 09:54   Ref. Range 12/14/2018 09:51   Sodium Latest Ref Range: 133 - 144 mmol/L 136   Potassium Latest Ref Range: 3.4 - 5.3 mmol/L 4.1   Chloride Latest Ref Range: 94 - 109 mmol/L 100   Carbon Dioxide Latest Ref Range: 20 - 32 mmol/L 27   Urea Nitrogen Latest Ref Range: 7 - 30 mg/dL 48 (H)   Creatinine Latest Ref Range: 0.52 - 1.04 mg/dL 2.18 (H)   GFR Estimate Latest Ref Range: >60 mL/min/1.7m2 21 (L)   GFR Estimate If Black Latest Ref Range: >60 mL/min/1.7m2 26 (L)   Calcium Latest Ref Range: 8.5 - 10.1 mg/dL 9.1   Anion Gap Latest Ref Range: 3 - 14 mmol/L 9   Glucose Latest Ref Range: 70 - 99 mg/dL 152 (H)       Medication changes:  1. Hold Lasix for 2 days.   2. In 2 days restart lasix at 20 mg daily.     Follow up:    Please return to clinic for follow-up:  As scheduled.       Please call if you have:  1. Weight gain of more than 2 pounds in a day or 5 pounds in a week  2. Increased shortness of breath, swelling or bloating  3. Dizziness, lightheadedness   4. Any questions or concerns.     Follow the American Heart Association Diet and Lifestyle recommendations:  Limit saturated fat, trans fat, sodium, red meat, sweets and sugar-sweetened beverages. If you choose to eat red meat, compare labels and select the leanest cuts available.  Aim for at least 150 minutes of moderate physical activity or 75 minutes of vigorous physical activity - or an equal combination of both - each week.    During business hours: [phone], press option # 1 to be routed to the [facility] then option # 3 for medical questions to speak with a nurse.     After hours, weekends or holidays: On Call Cardiologist- [phone]   option #4 and ask to speak to the on-call Cardiologist. Inform them you are a CORE/heart failure patient at the [facility].      Amna Casey, RN  Cardiology Nurse Care  Coordinator    Keep up the good work!    Take Care!

## 2018-12-17 NOTE — LETTER
12/17/2018      RE: Linda Spicer  [address]  [facility] [address]       Dear Colleague,    Thank you for the opportunity to participate in the care of your patient, Linda Spicer, at the [facility] at [facility]. Please see a copy of my visit note below.        Pl  HPI: Linda is an 87 year old female with a past medical history of CKD, HTN, anemia, pulmonary fibrosis, tachy/lisa syndrome s/p PPM placement, atrial flutter, PAF, mild to moderate MR, and HFpEF who presents to [facility] today following a recent hospitalization for acute diastolic heart failure.     She was hospitalized at [facility] from 11/28/18-12/4/18 for a presyncopal episode, fatigue, and acute decompensated heart failure. She was diuresed to an EDW of 154 lbs and was discharged on Furosemide 40 mg in the am and 20 mg in the evening.     She has been home for about a week and has been feeling terrible.  She complains of shortness of breath and fatigue that is constant.  She notices SOB with prolonged talking.  Since discharge, she has had one lightheaded episode, which occurred on Sunday.  Denies any near syncopal episodes.  Her appetite is good.  Denies early satiety.  She is staying within her recommended sodium restrictions and is drinking at least 40-60 ounces daily.      PAST MEDICAL HISTORY:  Past Medical History:   Diagnosis Date     Adjustment disorder with anxious mood 5/18/2015     Advanced directives, counseling/discussion 8/30/2012    Patient states has Advance Directive and will bring in a copy to clinic. 8/30/2012   Tevin May  Clinic Medical Assistant \       Anemia 9/25/2015     Basal cell cancer      CHF (congestive heart failure) (H) 9/18/2014     CKD (chronic kidney disease) stage 3, GFR 30-59 ml/min (H) 9/29/2015     DDD (degenerative disc disease), lumbar 9/25/2015     Diffuse idiopathic pulmonary fibrosis (H)  5/6/2013     Encounter for palliative care 5/18/2015     History of blood transfusion 9/29/2015     Hypertension goal BP (blood pressure) < 140/90 9/7/2012     Hypothyroid 9/7/2012     Irritable bowel syndrome 10/29/2013     Macular degeneration      Macular degeneration, 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     Nondisplaced spiral fracture of shaft of humerus      Osteoporosis 8/13/2013     Imo Update utility     RA (rheumatoid arthritis) (H) 5/7/2013     Rheumatic fever      Shingles      Spinal stenosis of lumbar region with neurogenic claudication 9/14/2015       FAMILY HISTORY:  Family History   Problem Relation Age of Onset     Hypertension Mother      Psychotic Disorder Father      Diabetes Son      Diabetes Daughter      Blood Disease Daughter        SOCIAL HISTORY:  Social History     Socioeconomic History     Marital status:      Spouse name: Not on file     Number of children: Not on file     Years of education: Not on file     Highest education level: Not on file   Social Needs     Financial resource strain: Not on file     Food insecurity - worry: Not on file     Food insecurity - inability: Not on file     Transportation needs - medical: Not on file     Transportation needs - non-medical: Not on file   Occupational History     Not on file   Tobacco Use     Smoking status: Never Smoker     Smokeless tobacco: Never Used   Substance and Sexual Activity     Alcohol use: Yes     Comment: rare wine      Drug use: No     Sexual activity: No   Other Topics Concern     Parent/sibling w/ CABG, MI or angioplasty before 65F 55M? Not Asked   Social History Narrative    . Has six children. She enjoys bridge and genealogy. Her  has cancer. Her son has financial and alcohol issues.       CURRENT MEDICATIONS:  Current Outpatient Medications   Medication Sig Dispense Refill     abatacept (ORENCIA) 250 MG injection Inject 750 mg into the vein every 28 days 3 each 0     acetaminophen (TYLENOL) 500 MG tablet Take 1  tablet (500 mg) by mouth every 6 hours as needed for mild pain or fever       albuterol (PROVENTIL) (2.5 MG/3ML) 0.083% neb solution Take 1 vial (2.5 mg) by nebulization every 6 hours as needed for shortness of breath / dyspnea or wheezing 360 mL      apixaban ANTICOAGULANT (ELIQUIS) 2.5 MG tablet Take 1 tablet (2.5 mg) by mouth 2 times daily 180 tablet 3     azaTHIOprine (IMURAN) 50 MG tablet Take 1 tablet (50 mg) by mouth daily 90 tablet 2     Blood Pressure Monitor KIT 1 kit daily as needed 1 kit 0     calcium carbonate-vitamin D (OS-FATOUMATA) 500-400 MG-UNIT tablet Take 1 tablet by mouth daily 60 tablet      Carboxymethylcellulose Sod PF (CELLUVISC/REFRESH LIQUIGEL) 1 % ophthalmic gel Place 1 drop into both eyes daily as needed for dry eyes 1 Bottle      carvedilol (COREG) 3.125 MG tablet Take 1 tablet (3.125 mg) by mouth 2 times daily (with meals) 60 tablet 11     cetirizine (ZYRTEC ALLERGY) 10 MG tablet Take 1 tablet (10 mg) by mouth At Bedtime 30 tablet      denosumab (PROLIA) 60 MG/ML SOLN injection Inject 1 mL (60 mg) Subcutaneous every 6 months 1 mL      folic acid (FOLVITE) 1 MG tablet Take 1 tablet (1 mg) by mouth daily 90 tablet 3     furosemide (LASIX) 20 MG tablet Take 40mg in the AM and 20mg in the  tablet 3     hypromellose (GENTEAL) 0.3 % SOLN ophthalmic solution Place 1 drop into both eyes daily as needed for dry eyes       levothyroxine (SYNTHROID/LEVOTHROID) 75 MCG tablet Take 1 tablet (75 mcg) by mouth daily 90 tablet 1     Multiple Vitamins-Minerals (PRESERVISION AREDS) CAPS Take 1 capsule by mouth 2 times daily 30 capsule      nitroGLYcerin (NITROSTAT) 0.4 MG sublingual tablet Place 1 tablet (0.4 mg) under the tongue every 5 minutes as needed for chest pain 25 tablet 0     order for DME Dispense SP Walker-small 1 each 0     order for DME Equipment being ordered: Dispense baffle, for use with nebulizer. 1 each 0     order for DME Equipment being ordered: Nebulizer. Use with Albuterol. 1 each  0     order for DME Equipment being ordered: Dispense face mask.  Mrs. Spicer is immunosuppressed due to rheumatoid arthritis. 1 [address]     ranibizumab (LUCENTIS) 0.3 MG/0.05ML SOLN 0.05 mLs (0.3 mg) by Intravitreal route See Admin Instructions Every 6 weeks       ranitidine (ZANTAC) 150 MG tablet Take 1 tablet (150 mg) by mouth 2 times daily 60 tablet      saccharomyces boulardii (FLORASTOR) 250 MG capsule Take 1 capsule (250 mg) by mouth daily 14 capsule      senna-docusate (SENOKOT-S/PERICOLACE) 8.6-50 MG tablet Take 1 tablet by mouth daily as needed for constipation 100 tablet      trimethoprim (TRIMPEX) 100 MG tablet Take 0.5 tablets (50 mg) by mouth daily 45 tablet 1     vitamin C (ASCORBIC ACID) 500 MG tablet Take 1 tablet (500 mg) by mouth daily 30 tablet      EXAM:  /82 (BP Location: Right arm, Patient Position: Chair, Cuff Size: Adult Regular)   Pulse 72   Wt 72.1 kg (159 lb)   LMP  (LMP Unknown)   SpO2 99%   BMI 27.72 kg/m      Home weight: 154.6 lbs  General: alert, articulate, and in no acute distress.  HEENT: normocephalic, atraumatic, anicteric sclera, EOMI, normal palate, mucosa moist, no cyanosis.    Neck: neck supple.  No adenopathy, masses, or carotid bruits.  JVP below clavicle.   Heart: regular rhythm, normal S1/S2, no murmur, gallop, rub.  Precordium quiet with normal PMI.     Lungs: Coarse crackles bilaterally mid lungs to bases.  No ronchi or wheezing.  No accessory muscle use, respirations unlabored.   Abdomen: soft, non-tender, bowel sounds present, no hepatomegaly  Extremities: 1+ pitting edema.  No cyanosis.  Extremities warm to touch.  Neurological: Alert and oriented x 3.  Normal speech and affect, no gross motor deficits  Skin:  Skin turgor dry with moderate tenting. No ecchymoses, rashes, or clubbing.       Labs:  CBC RESULTS:  Lab Results   Component Value Date    WBC 5.9 12/03/2018    RBC 3.22 (L) 12/03/2018    HGB 10.7 (L) 12/03/2018    HCT 32.6 (L) 12/03/2018      (H) 2018    MCH 33.2 (H) 2018    MCHC 32.8 2018    RDW 13.0 2018     2018       CMP RESULTS:  Lab Results   Component Value Date     2018    POTASSIUM 4.1 2018    CHLORIDE 100 2018    CO2 27 2018    ANIONGAP 9 2018     (H) 2018    BUN 48 (H) 2018    CR 2.18 (H) 2018    GFRESTIMATED 21 (L) 2018    GFRESTBLACK 26 (L) 2018    FATOUMATA 9.1 2018    BILITOTAL 0.4 2018    ALBUMIN 3.1 (L) 2018    ALKPHOS 57 2018    ALT 18 2018    AST 19 2018        INR RESULTS:  Lab Results   Component Value Date    INR 1.40 (H) 2018       No components found for: CK  Lab Results   Component Value Date    MAG 2.4 (H) 2018     Lab Results   Component Value Date    NTBNP 3,154 (H) 10/29/2018       Most recent echocardiogram:  Recent Results (from the past 4320 hour(s))   Echo limited (Adults Only)    Narrative    [id number]  [id number]  [id number]  [id number]^CHAD^YANDY^T           [facility]  Echocardiography Laboratory  [address] [place] [id number]     Name: RG WALTER  MRN: [id number]  : 1931  Study Date: 2018 11:24 AM  Age: 87 yrs  Gender: Female  [place] [facility] [place] For Study: Mitral Valve Disorder  Ordering Physician: YANDY BONILLA  Performed By: Elver Smith RDCS     BSA: 1.7 m2  Height: 63 in  Weight: 156 lb  BP: 114/63 mmHg  _____________________________________________________________________________  __        Procedure  Limited Portable Echo Adult.  _____________________________________________________________________________  __        Interpretation Summary  Limited study.  Normal biventricular size and systolic function. The Ejection Fraction is  estimated at 55-60%.  Mild to moderate mitral insufficiency is present.  Mild to moderate tricuspid insufficiency is present.  Estimated pulmonary artery  systolic pressure is 26 mmHg plus right atrial  pressure.  Compared to ECHO on 10/9, there are no significant changes. Mitral  regurgitation less prominent compared to recent YOLANDA (10/16).     _____________________________________________________________________________  __        Left Ventricle  Left ventricular size is normal. There is increased LV wall thickness. The  Ejection Fraction is estimated at 55-60%. No regional wall motion  abnormalities are seen.     Right Ventricle  The right ventricle is normal size. Global right ventricular function is  normal.     Atria  Moderate right atrial enlargement is present. Moderate left atrial enlargement  is present.        Mitral Valve  Mild to moderate mitral insufficiency is present.     Aortic Valve  Trace aortic insufficiency is present.     Tricuspid Valve  Mild to moderate tricuspid insufficiency is present. Estimated pulmonary  artery systolic pressure is 26 mmHg plus right atrial pressure. There are  multiple TR jets.     Pulmonic Valve  The pulmonic valve cannot be assessed.     Vessels  The inferior vena cava is normal. The thoracic aorta cannot be assessed.     Pericardium  No pericardial effusion is present.        Compared to Previous Study  Compared to ECHO on 10/9, there are no significant changes. MR on YOLANDA (10/16)  appears more severe.  _____________________________________________________________________________  __           Doppler Measurements & Calculations  TV max P.5 mmHg  TR max adrian: 252.4 cm/sec  TR max P.5 mmHg     _____________________________________________________________________________  __           Report approved by: Blanca Adams 2018 02:40 PM      ECHO COMPLETE WITH OPTISON    Narrative    [id number]  ECH73  [id number]  [id number]^DINAH^CRYS           [facility],[place]  Echocardiography Laboratory  [address] [place] [id number]     Name: RG WALTER  MRN:  [id number]  : 1931  Study Date: 10/09/2018 01:09 PM  Age: 87 yrs  Gender: Female  [place] [facility] [place] Study: CHF  History: CHF  Ordering Physician: CRYS NIX  Referring Physician: ERIC ENGLE  Performed By: Sharonda Galo RDCS     BSA: 1.8 m2  Height: 63 in  Weight: 164 lb  BP: 180/87 mmHg  _____________________________________________________________________________  __        Procedure  Complete Portable Echo Adult. Contrast Optison. Optison (NDC #7660-7466-06)  given intravenously. Patient was given 6 ml mixture of 3 ml Optison and 6 ml  saline. 3 ml wasted.  _____________________________________________________________________________  __        Interpretation Summary  Global and regional left ventricular function is normal with an EF of 55-60%.  The right ventricle is not well visualized.  Mild to moderate tricuspid insufficiency is present.  There is mild pulmonary hypertension.  The inferior vena cava was normal in size.  This study was compared with the study from 14 .  There has been no change.     _____________________________________________________________________________  __        Left Ventricle  Global and regional left ventricular function is normal with an EF of 55-60%.  Left ventricular size is normal. Left ventricular wall thickness is normal.  Left ventricular diastolic function is indeterminate.     Right Ventricle  Likely normal RV function but unable to assess size. The right ventricle is  not well visualized.     Atria  Both atria appear normal. The atrial septum is intact as assessed by color  Doppler .     Mitral Valve  The mitral valve is normal. Mild to moderate mitral insufficiency is present.        Aortic Valve  Aortic valve is normal in structure and function. The aortic valve is  tricuspid. Mild aortic insufficiency is present.     Tricuspid Valve  Mild to moderate tricuspid insufficiency is present. Right ventricular  systolic pressure  is 40mmHg above the right atrial pressure. Mild (pulmonary  artery systolic pressure<50mmHg) pulmonary hypertension is present.     Pulmonic Valve  The pulmonic valve is normal.     Vessels  The aorta root is normal. The thoracic aorta is normal. The pulmonary artery  is normal. The inferior vena cava was normal in size with preserved  respiratory variability. Estimated mean right atrial pressure is 3 mmHg  (normal).     Pericardium  No pericardial effusion is present.        Compared to Previous Study  This study was compared with the study from 14 . There has been no  change.     Attestation  I have personally viewed the imaging and agree with the interpretation and  report as documented by the fellow, Parris Duvall, and/or edited by me.  _____________________________________________________________________________  __     MMode/2D Measurements & Calculations  IVSd: 1.1 cm  LVIDd: 3.7 cm  LVIDs: 1.9 cm  LVPWd: 1.0 cm  FS: 47.7 %  LV mass(C)d: 122.1 grams  LV mass(C)dI: 68.7 grams/m2  Ao root diam: 2.7 cm  LA dimension: 4.3 cm  asc Aorta Diam: 3.4 cm  LA/Ao: 1.6  LVOT diam: 2.0 cm  LVOT area: 3.1 cm2  LA Volume (BP): 51.3 ml     LA Volume Index (BP): 28.8 ml/m2  RWT: 0.54        Doppler Measurements & Calculations  MV E max alvarado: 104.0 cm/sec  MV A max alvarado: 33.8 cm/sec  MV E/A: 3.1  MV dec time: 0.14 sec  Ao V2 max: 89.2 cm/sec  Ao max PG: 3.0 mmHg  Ao V2 mean: 66.3 cm/sec  Ao mean P.0 mmHg  Ao V2 VTI: 16.6 cm  RAJ(I,D): 3.5 cm2  RAJ(V,D): 3.2 cm2  LV V1 max PG: 3.3 mmHg  LV V1 max: 90.7 cm/sec  LV V1 VTI: 18.7 cm  SV(LVOT): 58.7 ml  SI(LVOT): 33.1 ml/m2  PA acc time: 0.06 sec  TR max alvarado: 242.0 cm/sec  TR max P.7 mmHg  AV Alvarado Ratio (DI): 1.0  RAJ Index (cm2/m2): 2.0  Medial E/e': 19.5        _____________________________________________________________________________  __        Report approved by: Blanca Adams 10/09/2018 04:28 PM            Assessment and Plan:    In summary, this is a very  pleasant 87 year old with HFpEF who appears hypovolemic today with worsening renal function suggesting she's dry.  I have asked her to hold her Lasix for two days, and then decrease her Lasix to 20 mg daily after that.  She will follow up as scheduled with Dr. Jiménez and the [facility] on Wednesday 12/19.  We will determine further follow up at that time.     The risk factors for HFpEF in her include (age, gender, HTN, obesity) Therefore the mainstays of therapy for HFpEF include volume management, blood pressure control, treating underlying sleep apnea if present, treatment of underlying CAD if within the goals of care, weight management, exercise training, rate and rhythm control strategy for atrial fibrillation, as well as consideration for clinical trials. In all of our patients we consider spironolactone in low risk patients given the positive CHF results in the TOPCAT trial.     1. HFpEF:   Stage C  NYHA CLASS IV:  Symptoms at rest , though not from heart failure.   BP: controlled at 130/82  Fluid status hypovolemic.  Decrease diuretics as outlined above.   Aldosterone antagonist: no, Deferred today due to NEIL  Ischemia evaluation: Negative dobutamine stress test 2014  ACEi/ARB -  None.  BB - Coreg 3.125 mg twice daily   SCD prophylaxis - N/A, EF is normal   NSAID use: Contraindicated, reviewed with patient   Sleep Apnea Evaluation: None.   Previously evaluated.  Doesn't want re-evaluation or CPAP  Remote Monitoring:  None.     2.  Atrial Fibrillation:  Anticoagulation: Apixaban 2.5 mg twice daily    Rate control:  Coreg 3.125 mg twice daily.  ChadsVasc Score: 4      3.  Tachy-Jadiel syndrome s/p PPM:  Device check as scheduled on 12/19.    4.  Advanced Care Planning: Discussed code status with her today. She would like to be DNR.  She doesn't want chest compressions but is willing to get a breathing tube placed if needed.     5.  NEIL on CKD:  Creatinine 2.18 today.  Previously 1.48.  Suspect over diuresis.   Hold lasix for two days, and decreased to 20 mg daily after that. Repeat BMP on Wednesday 12/19.    6. Mitral regurgitation:  Keep appointment with Dr. Jiménez as scheduled.     7.  Follow-up: on 12/19 with Dr. Jiménez, device, and Shala MOELLER, [facility]  [facility]

## 2018-12-17 NOTE — PROGRESS NOTES
HPI: Linda is an 87 year old female with a past medical history of CKD, HTN, anemia, pulmonary fibrosis, tachy/lisa syndrome s/p PPM placement, atrial flutter, PAF, mild to moderate MR, and HFpEF who presents to [facility] today following a recent hospitalization for acute diastolic heart failure.     She was hospitalized at [facility] from 11/28/18-12/4/18 for a presyncopal episode, fatigue, and acute decompensated heart failure. She was diuresed to an EDW of 154 lbs and was discharged on Furosemide 40 mg in the am and 20 mg in the evening.     She has been home for about a week and has been feeling terrible.  She complains of shortness of breath and fatigue that is constant.  She notices SOB with prolonged talking.  Since discharge, she has had one lightheaded episode, which occurred on Sunday.  Denies any near syncopal episodes.  Her appetite is good.  Denies early satiety.  She is staying within her recommended sodium restrictions and is drinking at least 40-60 ounces daily.      PAST MEDICAL HISTORY:  Past Medical History:   Diagnosis Date     Adjustment disorder with anxious mood 5/18/2015     Advanced directives, counseling/discussion 8/30/2012    Patient states has Advance Directive and will bring in a copy to clinic. 8/30/2012   Tevin May  [facility] Medical Assistant \       Anemia 9/25/2015     Basal cell cancer      CHF (congestive heart failure) (H) 9/18/2014     CKD (chronic kidney disease) stage 3, GFR 30-59 ml/min (H) 9/29/2015     DDD (degenerative disc disease), lumbar 9/25/2015     Diffuse idiopathic pulmonary fibrosis (H) 5/6/2013     Encounter for palliative care 5/18/2015     History of blood transfusion 9/29/2015     Hypertension goal BP (blood pressure) < 140/90 9/7/2012     Hypothyroid 9/7/2012     Irritable bowel syndrome 10/29/2013     Macular degeneration      Macular degeneration, 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     Nondisplaced spiral fracture of shaft of humerus      Osteoporosis 8/13/2013     [place]  Update utility     RA (rheumatoid arthritis) (H) 5/7/2013     Rheumatic fever      Shingles      Spinal stenosis of lumbar region with neurogenic claudication 9/14/2015       FAMILY HISTORY:  Family History   Problem Relation Age of Onset     Hypertension Mother      Psychotic Disorder Father      Diabetes Son      Diabetes Daughter      Blood Disease Daughter        SOCIAL HISTORY:  Social History     Socioeconomic History     Marital status:      Spouse name: Not on file     Number of children: Not on file     Years of education: Not on file     Highest education level: Not on file   Social Needs     Financial resource strain: Not on file     Food insecurity - worry: Not on file     Food insecurity - inability: Not on file     Transportation needs - medical: Not on file     Transportation needs - non-medical: Not on file   Occupational History     Not on file   Tobacco Use     Smoking status: Never Smoker     Smokeless tobacco: Never Used   Substance and Sexual Activity     Alcohol use: Yes     Comment: rare wine      Drug use: No     Sexual activity: No   Other Topics Concern     Parent/sibling w/ CABG, MI or angioplasty before 65F 55M? Not Asked   Social History Narrative    . Has six children. She enjoys [employer] and [email or web address]. Her  has cancer. Her son has financial and alcohol issues.       CURRENT MEDICATIONS:  Current Outpatient Medications   Medication Sig Dispense Refill     abatacept (ORENCIA) 250 MG injection Inject 750 mg into the vein every 28 days 3 each 0     acetaminophen (TYLENOL) 500 MG tablet Take 1 tablet (500 mg) by mouth every 6 hours as needed for mild pain or fever       albuterol (PROVENTIL) (2.5 MG/3ML) 0.083% neb solution Take 1 vial (2.5 mg) by nebulization every 6 hours as needed for shortness of breath / dyspnea or wheezing 360 mL      apixaban ANTICOAGULANT (ELIQUIS) 2.5 MG tablet Take 1 tablet (2.5 mg) by mouth 2 times daily 180 tablet 3     azaTHIOprine (IMURAN)  50 MG tablet Take 1 tablet (50 mg) by mouth daily 90 tablet 2     Blood Pressure Monitor KIT 1 kit daily as needed 1 kit 0     calcium carbonate-vitamin D (OS-FATOUMATA) 500-400 MG-UNIT tablet Take 1 tablet by mouth daily 60 tablet      Carboxymethylcellulose Sod PF (CELLUVISC/REFRESH LIQUIGEL) 1 % ophthalmic gel Place 1 drop into both eyes daily as needed for dry eyes 1 Bottle      carvedilol (COREG) 3.125 MG tablet Take 1 tablet (3.125 mg) by mouth 2 times daily (with meals) 60 tablet 11     cetirizine (ZYRTEC ALLERGY) 10 MG tablet Take 1 tablet (10 mg) by mouth At Bedtime 30 tablet      denosumab (PROLIA) 60 MG/ML SOLN injection Inject 1 mL (60 mg) Subcutaneous every 6 months 1 mL      folic acid (FOLVITE) 1 MG tablet Take 1 tablet (1 mg) by mouth daily 90 tablet 3     furosemide (LASIX) 20 MG tablet Take 40mg in the AM and 20mg in the  tablet 3     hypromellose (GENTEAL) 0.3 % SOLN ophthalmic solution Place 1 drop into both eyes daily as needed for dry eyes       levothyroxine (SYNTHROID/LEVOTHROID) 75 MCG tablet Take 1 tablet (75 mcg) by mouth daily 90 tablet 1     Multiple Vitamins-Minerals (PRESERVISION AREDS) CAPS Take 1 capsule by mouth 2 times daily 30 capsule      nitroGLYcerin (NITROSTAT) 0.4 MG sublingual tablet Place 1 tablet (0.4 mg) under the tongue every 5 minutes as needed for chest pain 25 tablet 0     order for DME Dispense SP Walker-small 1 each 0     order for DME Equipment being ordered: Dispense baffle, for use with nebulizer. 1 each 0     order for DME Equipment being ordered: Nebulizer. Use with Albuterol. 1 each 0     order for DME Equipment being ordered: Dispense face mask.  Mrs. Spicer is immunosuppressed due to rheumatoid arthritis. 1 Box 11     ranibizumab (LUCENTIS) 0.3 MG/0.05ML SOLN 0.05 mLs (0.3 mg) by Intravitreal route See Admin Instructions Every 6 weeks       ranitidine (ZANTAC) 150 MG tablet Take 1 tablet (150 mg) by mouth 2 times daily 60 tablet      saccharomyces boulardii  (FLORASTOR) 250 MG capsule Take 1 capsule (250 mg) by mouth daily 14 capsule      senna-docusate (SENOKOT-S/PERICOLACE) 8.6-50 MG tablet Take 1 tablet by mouth daily as needed for constipation 100 tablet      trimethoprim (TRIMPEX) 100 MG tablet Take 0.5 tablets (50 mg) by mouth daily 45 tablet 1     vitamin C (ASCORBIC ACID) 500 MG tablet Take 1 tablet (500 mg) by mouth daily 30 tablet        ROS:  Review Of Systems  Constitutional:  +Overall fatigue.  Weight up a couple of pounds.  Skin: positive skin breakdown around groin.  Eyes: +macular degeneration  Ears/Nose/Throat: negative for hearing loss, tinnitus, sinus trouble  Respiratory: See HPI  Cardiovascular: See HPI  Gastrointestinal: positive for constipation and diarrhea unchanged.   Genitourinary: negative, dysuria, frequency and hematuria  Musculoskeletal: negative and joint pain  Neurologic: Negative.   Psychiatric: +Anxiety  Hematologic/Lymphatic/Immunologic: positive for anemia  Endocrine: positive for thyroid disorder    EXAM:  /82 (BP Location: Right arm, Patient Position: Chair, Cuff Size: Adult Regular)   Pulse 72   Wt 72.1 kg (159 lb)   LMP  (LMP Unknown)   SpO2 99%   BMI 27.72 kg/m     Home weight: 154.6 lbs  General: alert, articulate, and in no acute distress.  HEENT: normocephalic, atraumatic, anicteric sclera, EOMI, normal palate, mucosa moist, no cyanosis.    Neck: neck supple.  No adenopathy, masses, or carotid bruits.  JVP below clavicle.   Heart: regular rhythm, normal S1/S2, no murmur, gallop, rub.  Precordium quiet with normal PMI.     Lungs: Coarse crackles bilaterally mid lungs to bases.  No ronchi or wheezing.  No accessory muscle use, respirations unlabored.   Abdomen: soft, non-tender, bowel sounds present, no hepatomegaly  Extremities: 1+ pitting edema.  No cyanosis.  Extremities warm to touch.  Neurological: Alert and oriented x 3.  Normal speech and affect, no gross motor deficits  Skin:  Skin turgor dry with moderate  tenting. No ecchymoses, rashes, or clubbing.       Labs:  CBC RESULTS:  Lab Results   Component Value Date    WBC 5.9 2018    RBC 3.22 (L) 2018    HGB 10.7 (L) 2018    HCT 32.6 (L) 2018     (H) 2018    MCH 33.2 (H) 2018    MCHC 32.8 2018    RDW 13.0 2018     2018       CMP RESULTS:  Lab Results   Component Value Date     2018    POTASSIUM 4.1 2018    CHLORIDE 100 2018    CO2 27 2018    ANIONGAP 9 2018     (H) 2018    BUN 48 (H) 2018    CR 2.18 (H) 2018    GFRESTIMATED 21 (L) 2018    GFRESTBLACK 26 (L) 2018    FATOUMATA 9.1 2018    BILITOTAL 0.4 2018    ALBUMIN 3.1 (L) 2018    ALKPHOS 57 2018    ALT 18 2018    AST 19 2018        INR RESULTS:  Lab Results   Component Value Date    INR 1.40 (H) 2018       No components found for: CK  Lab Results   Component Value Date    MAG 2.4 (H) 2018     Lab Results   Component Value Date    NTBNP 3,154 (H) 10/29/2018       Most recent echocardiogram:  Recent Results (from the past 4320 hour(s))   Echo limited (Adults Only)    Narrative    [id number]  [id number]  [id number]  [id number]^CHAD^YANDY^T           [facility],[place]  Echocardiography Laboratory  [address] [place] [address]     Name: RG WALTER  MRN: [id number]  : 1931  Study Date: 2018 11:24 AM  Age: 87 yrs  Gender: Female  Patient Location: [facility] [place]  Reason For Study: Mitral Valve Disorder  Ordering Physician: YANDY BONILLA  Performed By: Elver Smith RDCS     BSA: 1.7 m2  Height: 63 in  Weight: 156 lb  BP: 114/63 mmHg  _____________________________________________________________________________  __        Procedure  Limited Portable Echo Adult.  _____________________________________________________________________________  __        Interpretation Summary  Limited study.  Normal  biventricular size and systolic function. The Ejection Fraction is  estimated at 55-60%.  Mild to moderate mitral insufficiency is present.  Mild to moderate tricuspid insufficiency is present.  Estimated pulmonary artery systolic pressure is 26 mmHg plus right atrial  pressure.  Compared to ECHO on 10/9, there are no significant changes. Mitral  regurgitation less prominent compared to recent YOLANDA (10/16).     _____________________________________________________________________________  __        Left Ventricle  Left ventricular size is normal. There is increased LV wall thickness. The  Ejection Fraction is estimated at 55-60%. No regional wall motion  abnormalities are seen.     Right Ventricle  The right ventricle is normal size. Global right ventricular function is  normal.     Atria  Moderate right atrial enlargement is present. Moderate left atrial enlargement  is present.        Mitral Valve  Mild to moderate mitral insufficiency is present.     Aortic Valve  Trace aortic insufficiency is present.     Tricuspid Valve  Mild to moderate tricuspid insufficiency is present. Estimated pulmonary  artery systolic pressure is 26 mmHg plus right atrial pressure. There are  multiple TR jets.     Pulmonic Valve  The pulmonic valve cannot be assessed.     Vessels  The inferior vena cava is normal. The thoracic aorta cannot be assessed.     Pericardium  No pericardial effusion is present.        Compared to Previous Study  Compared to ECHO on 10/9, there are no significant changes. MR on YOLANDA (10/16)  appears more severe.  _____________________________________________________________________________  __           Doppler Measurements & Calculations  TV max P.5 mmHg  TR max adrian: 252.4 cm/sec  TR max P.5 mmHg     _____________________________________________________________________________  __           Report approved by: Blanca Adams 2018 02:40 PM      ECHO COMPLETE WITH OPTISON    Narrative     [id number]  [id number]  [id number]  [id number]^DINAH^CRYS           [facility],[place]  Echocardiography Laboratory  [address] [place] [id number]     Name: RG WALTER  MRN: [id number]  : 1931  Study Date: 10/09/2018 01:09 PM  Age: 87 yrs  Gender: Female  [place] [facility] [place]  Reason For Study: CHF  History: CHF  Ordering Physician: CRYS NIX  Referring Physician: ERIC ENGLE  Performed By: Sharonda Galo RDCS     BSA: 1.8 m2  Height: 63 in  Weight: 164 lb  BP: 180/87 mmHg  _____________________________________________________________________________  __        Procedure  Complete Portable Echo Adult. Contrast Optison. Optison (NDC #8708-8438-49)  given intravenously. Patient was given 6 ml mixture of 3 ml Optison and 6 ml  saline. 3 ml wasted.  _____________________________________________________________________________  __        Interpretation Summary  Global and regional left ventricular function is normal with an EF of 55-60%.  The right ventricle is not well visualized.  Mild to moderate tricuspid insufficiency is present.  There is mild pulmonary hypertension.  The inferior vena cava was normal in size.  This study was compared with the study from 14 .  There has been no change.     _____________________________________________________________________________  __        Left Ventricle  Global and regional left ventricular function is normal with an EF of 55-60%.  Left ventricular size is normal. Left ventricular wall thickness is normal.  Left ventricular diastolic function is indeterminate.     Right Ventricle  Likely normal RV function but unable to assess size. The right ventricle is  not well visualized.     Atria  Both atria appear normal. The atrial septum is intact as assessed by color  Doppler .     Mitral Valve  The mitral valve is normal. Mild to moderate mitral insufficiency is present.        Aortic Valve  Aortic valve is  normal in structure and function. The aortic valve is  tricuspid. Mild aortic insufficiency is present.     Tricuspid Valve  Mild to moderate tricuspid insufficiency is present. Right ventricular  systolic pressure is 40mmHg above the right atrial pressure. Mild (pulmonary  artery systolic pressure<50mmHg) pulmonary hypertension is present.     Pulmonic Valve  The pulmonic valve is normal.     Vessels  The aorta root is normal. The thoracic aorta is normal. The pulmonary artery  is normal. The inferior vena cava was normal in size with preserved  respiratory variability. Estimated mean right atrial pressure is 3 mmHg  (normal).     Pericardium  No pericardial effusion is present.        Compared to Previous Study  This study was compared with the study from 14 . There has been no  change.     Attestation  I have personally viewed the imaging and agree with the interpretation and  report as documented by the fellow, Parris Duvall, and/or edited by me.  _____________________________________________________________________________  __     MMode/2D Measurements & Calculations  IVSd: 1.1 cm  LVIDd: 3.7 cm  LVIDs: 1.9 cm  LVPWd: 1.0 cm  FS: 47.7 %  LV mass(C)d: 122.1 grams  LV mass(C)dI: 68.7 grams/m2  Ao root diam: 2.7 cm  LA dimension: 4.3 cm  asc Aorta Diam: 3.4 cm  LA/Ao: 1.6  LVOT diam: 2.0 cm  LVOT area: 3.1 cm2  LA Volume (BP): 51.3 ml     LA Volume Index (BP): 28.8 ml/m2  RWT: 0.54        Doppler Measurements & Calculations  MV E max alvarado: 104.0 cm/sec  MV A max alvarado: 33.8 cm/sec  MV E/A: 3.1  MV dec time: 0.14 sec  Ao V2 max: 89.2 cm/sec  Ao max PG: 3.0 mmHg  Ao V2 mean: 66.3 cm/sec  Ao mean P.0 mmHg  Ao V2 VTI: 16.6 cm  RAJ(I,D): 3.5 cm2  RAJ(V,D): 3.2 cm2  LV V1 max PG: 3.3 mmHg  LV V1 max: 90.7 cm/sec  LV V1 VTI: 18.7 cm  SV(LVOT): 58.7 ml  SI(LVOT): 33.1 ml/m2  PA acc time: 0.06 sec  TR max alvarado: 242.0 cm/sec  TR max P.7 mmHg  AV Alvarado Ratio (DI): 1.0  RAJ Index (cm2/m2): 2.0  Medial E/e': 19.5         _____________________________________________________________________________  __        Report approved by: Blanca Adams 10/09/2018 04:28 PM            Assessment and Plan:    In summary, this is a very pleasant 87 year old with HFpEF who appears hypovolemic today with worsening renal function suggesting she's dry.  I have asked her to hold her Lasix for two days, and then decrease her Lasix to 20 mg daily after that.  She will follow up as scheduled with Dr. Jiménez and the [facility] clinic on Wednesday 12/19.  We will determine further follow up at that time.     The risk factors for HFpEF in her include (age, gender, HTN, obesity) Therefore the mainstays of therapy for HFpEF include volume management, blood pressure control, treating underlying sleep apnea if present, treatment of underlying CAD if within the goals of care, weight management, exercise training, rate and rhythm control strategy for atrial fibrillation, as well as consideration for clinical trials. In all of our patients we consider spironolactone in low risk patients given the positive CHF results in the TOPCAT trial.     1. HFpEF:   Stage C  NYHA CLASS IV:  Symptoms at rest , though not from heart failure.   BP: controlled at 130/82  Fluid status hypovolemic.  Decrease diuretics as outlined above.   Aldosterone antagonist: no, Deferred today due to NEIL  Ischemia evaluation: Negative dobutamine stress test 2014  ACEi/ARB -  None.  BB - Coreg 3.125 mg twice daily   SCD prophylaxis - N/A, EF is normal   NSAID use: Contraindicated, reviewed with patient   Sleep Apnea Evaluation: None.   Previously evaluated.  Doesn't want re-evaluation or CPAP  Remote Monitoring:  None.     2.  Atrial Fibrillation:  Anticoagulation: Apixaban 2.5 mg twice daily    Rate control:  Coreg 3.125 mg twice daily.  ChadsVasc Score: 4      3.  Tachy-Jadiel syndrome s/p PPM:  Device check as scheduled on 12/19.    4.  Advanced Care Planning: Discussed code status with  her today. She would like to be DNR.  She doesn't want chest compressions but is willing to get a breathing tube placed if needed.     5.  NEIL on CKD:  Creatinine 2.18 today.  Previously 1.48.  Suspect over diuresis.  Hold lasix for two days, and decreased to 20 mg daily after that. Repeat BMP on Wednesday 12/19.    6. Mitral regurgitation:  Keep appointment with Dr. Jiménez as scheduled.     7.  Follow-up: on 12/19 with Dr. Jiménez, device, and Shala MOELLER, CNP  [facility]

## 2018-12-17 NOTE — NURSING NOTE
"Chief Complaint   Patient presents with     RECHECK     Return [facility], 86 yo female, diastolic Hf. Pt reports feeling very fatigued, had an episode of feeling lightheaded yesterday. Always feels SOB.       Initial /82 (BP Location: Right arm, Patient Position: Chair, Cuff Size: Adult Regular)   Pulse 72   Wt 72.1 kg (159 lb)   LMP  (LMP Unknown)   SpO2 99%   BMI 27.72 kg/m   Estimated body mass index is 27.72 kg/m  as calculated from the following:    Height as of 12/13/18: 1.613 m (5' 3.5\").    Weight as of this encounter: 72.1 kg (159 lb)..  BP completed using cuff size: regular    Karla Khan MA    "

## 2018-12-17 NOTE — NURSING NOTE
Diet: Patient instructed regarding a heart healthy diet, including discussion of reduced fat and sodium intake. Patient demonstrated understanding of this information and agreed to call with further questions or concerns.  Labs: Patient was given results of the laboratory testing obtained today. Patient was instructed to return for the next laboratory testing. Patient demonstrated understanding of this information and agreed to call with further questions or concerns.   Med Reconcile: Reviewed and verified all current medications with the patient. The updated medication list was printed and given to the patient.  Return Appointment: Patient given instructions regarding scheduling next clinic visit. Patient demonstrated understanding of this information and agreed to call with further questions or concerns.  Patient stated she understood all health information given and agreed to call with further questions or concerns. Amna Casey RN [facility] Care Coordinator

## 2018-12-18 ENCOUNTER — TELEPHONE (OUTPATIENT)
Dept: CARDIOLOGY | Facility: CLINIC | Age: 83
End: 2018-12-18

## 2018-12-18 NOTE — TELEPHONE ENCOUNTER
Patient was called upon request. Patient's daughter was not avalable to talk to clinic staff. Nurses direct line phone number was given to patient for daughter to return call to clinic later on to discuss.      Marion Gutierrez RN

## 2018-12-18 NOTE — TELEPHONE ENCOUNTER
Reason for call:  Other   Patient called regarding (reason for call): call back  Additional comments: Patients son has questions regarding yesterday's visit. Please return call - try all numbers.    Phone number to reach patient:  Cell number on file:    Telephone Information:   Mobile [phone]       Best Time:  ASAP    Can we leave a detailed message on this number?  YES

## 2018-12-19 ENCOUNTER — ANCILLARY PROCEDURE (OUTPATIENT)
Dept: CARDIOLOGY | Facility: CLINIC | Age: 83
End: 2018-12-19
Attending: INTERNAL MEDICINE
Payer: MEDICARE

## 2018-12-19 ENCOUNTER — HOSPITAL ENCOUNTER (OUTPATIENT)
Facility: CLINIC | Age: 83
Setting detail: SPECIMEN
Discharge: HOME | End: 2018-12-19
Admitting: PHYSICIAN ASSISTANT
Payer: MEDICARE

## 2018-12-19 VITALS
HEIGHT: 64 IN | OXYGEN SATURATION: 98 % | HEART RATE: 71 BPM | SYSTOLIC BLOOD PRESSURE: 149 MMHG | WEIGHT: 159 LBS | BODY MASS INDEX: 27.14 KG/M2 | DIASTOLIC BLOOD PRESSURE: 71 MMHG

## 2018-12-19 DIAGNOSIS — I48.0 PAROXYSMAL ATRIAL FIBRILLATION (H): Primary | ICD-10-CM

## 2018-12-19 DIAGNOSIS — I50.30 HEART FAILURE WITH PRESERVED EJECTION FRACTION, NYHA CLASS I (H): ICD-10-CM

## 2018-12-19 DIAGNOSIS — J84.9 ILD (INTERSTITIAL LUNG DISEASE) (H): Primary | ICD-10-CM

## 2018-12-19 DIAGNOSIS — I50.30 HEART FAILURE WITH PRESERVED EJECTION FRACTION (H): ICD-10-CM

## 2018-12-19 DIAGNOSIS — I49.5 SICK SINUS SYNDROME (H): ICD-10-CM

## 2018-12-19 DIAGNOSIS — I34.0 NON-RHEUMATIC MITRAL REGURGITATION: ICD-10-CM

## 2018-12-19 LAB
ANION GAP SERPL CALCULATED.3IONS-SCNC: 8 MMOL/L (ref 3–14)
BUN SERPL-MCNC: 35 MG/DL (ref 7–30)
CALCIUM SERPL-MCNC: 8 MG/DL (ref 8.5–10.1)
CHLORIDE SERPL-SCNC: 101 MMOL/L (ref 94–109)
CO2 SERPL-SCNC: 27 MMOL/L (ref 20–32)
CREAT SERPL-MCNC: 1.41 MG/DL (ref 0.52–1.04)
GFR SERPL CREATININE-BSD FRML MDRD: 33 ML/MIN/{1.73_M2}
GLUCOSE SERPL-MCNC: 95 MG/DL (ref 70–99)
POTASSIUM SERPL-SCNC: 3.9 MMOL/L (ref 3.4–5.3)
SODIUM SERPL-SCNC: 135 MMOL/L (ref 133–144)

## 2018-12-19 PROCEDURE — 80048 BASIC METABOLIC PNL TOTAL CA: CPT | Performed by: PHYSICIAN ASSISTANT

## 2018-12-19 PROCEDURE — 99214 OFFICE O/P EST MOD 30 MIN: CPT | Mod: ZP | Performed by: INTERNAL MEDICINE

## 2018-12-19 PROCEDURE — G0463 HOSPITAL OUTPT CLINIC VISIT: HCPCS

## 2018-12-19 PROCEDURE — 36415 COLL VENOUS BLD VENIPUNCTURE: CPT | Performed by: PHYSICIAN ASSISTANT

## 2018-12-19 RX ORDER — FUROSEMIDE 20 MG
20 TABLET ORAL 2 TIMES DAILY
Qty: 60 TABLET | Refills: 0 | Status: ON HOLD | OUTPATIENT
Start: 2018-12-19 | End: 2019-01-20

## 2018-12-19 ASSESSMENT — MIFFLIN-ST. JEOR: SCORE: 1141.22

## 2018-12-19 ASSESSMENT — PAIN SCALES - GENERAL: PAINLEVEL: NO PAIN (0)

## 2018-12-19 NOTE — LETTER
12/19/2018      RE: Linda Spicer  [address] [place] [address]       Dear Colleague,    Thank you for the opportunity to participate in the care of your patient, Linda Spicer, at the [facility] acute hospital. Please see a copy of my visit note below.        PleaElectrophysiology Clinic Note  HPI:   Ms. 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 She presents today for follow up.     She was first diagnosed with AFL in 8/2016 when she presented with chest pain, dizziness, and lightheadedness. She was found to be in AFL and converted back to sinus while in the ER. She was started on Toprol XL and her losartan was decreased. Anticoagulation was deferred until Cardiology follow up. She was then started on Eliquis in 11/2016. She had recurrent AFL in 2/2017 and her beta blocker was increased. However, she developed symptomatic bradycardia and presyncope and her beta blocker was stopped. She then developed RVR again and had a PPM implanted for tachy/lisa syndrome at [facility]. Afterwards, she was restarted on Toprol XL and propofenone was added. She then had recurrent ER visits in 11/2017, 1/2018, and 2/2018 with palpitations, lightheadedness, and shortness of breath. Her device interrogation showed 7% AF burden up to 3 days corresponding to symptoms. She was then admitted to [facility] in 6/2018 after experiencing a fall and fractured her coccyx. She was in AF during that hospitalization and required DCCV. She had another fall in 7/2018 resulting in fractured S3. She then presented on 9/13/18 to [facility] with exhaustion, SOB, and some palpitations, She was back in AF. She underwent DCCV on 9/14/18. She stated that she had  "immediate improvement in her symptoms and \"felt like a new person.\" Then on 9/16/18, she awoke feeling worse and felt she was back in AF. Device transmission confirmed recurrence of AF. She followed up with Dr. Loza on 9/17/18 and she continued to feel SOB, RUSSELL, fatigue, and some lightheadedness.  Decision was made to stop propafenone for 72 hours and start multaq 400 mg BID thereafter and scheduled DCCV. She then called and requested appointment with EP due to ongoing symptoms and lack of insurance coverage for multaq. She also has a cardiac history of HFpEF and has been on cozaar and metoprolol for this. Last echo in 5/2016 showed LVEF 65%, mild RV enlargement and mildly decreased RV function, diastolic dysfunction, moderate MR, moderately dilated LA, and evidence of pulmonary HTN. Last echo from OSH showed LVEF 65-70%, midly dilated LA, sigmoid septum, mild LVH, and mild AR. She does have known rheumatoid pulmonary fibrosis dating back to 1973. She had PFTs in 2015 that showed moderate lung diffusion defect. She has followed with Pulmonary Dr. Comer and had previously had sever dyspnea and hyperventilation in the past now showing significant improvement in ventilatory control and symptoms with improvement in PFTs and exercise tolerance. She has been tolerating low dose BB with her pulmonary status.     She was seen in EP clinic on 9/19/18 for AF management. She reported feeling extremely tired, decreased stamina, and RUSSELL which worsened with the onset of AF. Device interrogation showed normal pacemaker function. She went back into AF on 9/16/18 @ 0642. AP = 0.6%.   = 79.4%. Decision was made to start amiodarone and pursue DCCV.     She was hospitalized 10/2018 for a HF exacerbation, was diuresed, and underwent a successful DCCV on 10/11/2018. She then followed up in clinic and was back in AF and arranged again for another DCCV hoping that the more amiodarone load would be helpful in maintaining sinus. She " was then hospitalized 11/2018  for near-syncope while on the toliet (thought to be vasovagal/orthostatic in nature) with no evidence for new arrhythmia on device, and, per Device report, she had been AF/AFL for several days prior without knowledge or symptoms.  Her amiodarone appears to have been stopped during hospitalization in 11/2018. An echocardiogram 10/9/2018 normal LV function with EF 55-60%, mild pulmonary hypertension, and mild-moderate TI, mild-moderate MR.YOLANDA 10/16/2018 showed progression to severe mitral insufficiency. Multiple MR jets noted. There is blunted systolic forward flow in 2 of the pulmonary veins. MR volume is estimated at 34ml, however this likely underestimates MR due to multiple jets. Referral was made to consider mireille clip.     She presents today for follow up. She continues to have intermittent dizziness, fatigue, dyspnea with activity (difficult to walk and with transferring self), dyspnea with talking.  She has been seeing [facility], SCr bumped and her diuretics were held. Device interrogation today shows 1 AT/AF episode recorded on 12/8/18 lasting > 100 hours in duration.  AF burden = 65%. Intrinsic rhythm = SB @ 46 bpm. AP = 35.8%.  = 62.7%. She denies any chest pain/pressures, leg/ankle swelling, PND, orthopnea, or syncopal symptoms. Current cardiac medications include: Coreg, Eliquis and spironolactone.     PAST MEDICAL HISTORY:  Past Medical History:   Diagnosis Date     Adjustment disorder with anxious mood 5/18/2015     Advanced directives, counseling/discussion 8/30/2012    Patient states has Advance Directive and will bring in a copy to clinic. 8/30/2012   Tevin May  [facility] Medical Assistant \       Anemia 9/25/2015     Basal cell cancer      CHF (congestive heart failure) (H) 9/18/2014     CKD (chronic kidney disease) stage 3, GFR 30-59 ml/min (H) 9/29/2015     DDD (degenerative disc disease), lumbar 9/25/2015     Diffuse idiopathic pulmonary fibrosis (H) 5/6/2013      Encounter for palliative care 5/18/2015     History of blood transfusion 9/29/2015     Hypertension goal BP (blood pressure) < 140/90 9/7/2012     Hypothyroid 9/7/2012     Irritable bowel syndrome 10/29/2013     Macular degeneration      Macular degeneration, 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     Nondisplaced spiral fracture of shaft of humerus      Osteoporosis 8/13/2013     Imo Update utility     RA (rheumatoid arthritis) (H) 5/7/2013     Rheumatic fever      Shingles      Spinal stenosis of lumbar region with neurogenic claudication 9/14/2015       CURRENT MEDICATIONS:  Current Outpatient Medications   Medication Sig Dispense Refill     abatacept (ORENCIA) 250 MG injection Inject 750 mg into the vein every 28 days 3 each 0     acetaminophen (TYLENOL) 500 MG tablet Take 1 tablet (500 mg) by mouth every 6 hours as needed for mild pain or fever       albuterol (PROVENTIL) (2.5 MG/3ML) 0.083% neb solution Take 1 vial (2.5 mg) by nebulization every 6 hours as needed for shortness of breath / dyspnea or wheezing 360 mL      apixaban ANTICOAGULANT (ELIQUIS) 2.5 MG tablet Take 1 tablet (2.5 mg) by mouth 2 times daily 180 tablet 3     azaTHIOprine (IMURAN) 50 MG tablet Take 1 tablet (50 mg) by mouth daily 90 tablet 2     Blood Pressure Monitor KIT 1 kit daily as needed 1 kit 0     calcium carbonate-vitamin D (OS-FATOUMATA) 500-400 MG-UNIT tablet Take 1 tablet by mouth daily 60 tablet      Carboxymethylcellulose Sod PF (CELLUVISC/REFRESH LIQUIGEL) 1 % ophthalmic gel Place 1 drop into both eyes daily as needed for dry eyes 1 Bottle      carvedilol (COREG) 3.125 MG tablet Take 1 tablet (3.125 mg) by mouth 2 times daily (with meals) 60 tablet 11     cetirizine (ZYRTEC ALLERGY) 10 MG tablet Take 1 tablet (10 mg) by mouth At Bedtime 30 tablet      denosumab (PROLIA) 60 MG/ML SOLN injection Inject 1 mL (60 mg) Subcutaneous every 6 months 1 mL      folic acid (FOLVITE) 1 MG tablet Take 1 tablet (1 mg) by mouth daily 90 tablet 3     furosemide (LASIX)  20 MG tablet Hold Lasix 12/18 and 12/19, then start 20 mg daily after that. 90 tablet 3     hypromellose (GENTEAL) 0.3 % SOLN ophthalmic solution Place 1 drop into both eyes daily as needed for dry eyes       levothyroxine (SYNTHROID/LEVOTHROID) 75 MCG tablet Take 1 tablet (75 mcg) by mouth daily 90 tablet 1     Multiple Vitamins-Minerals (PRESERVISION AREDS) CAPS Take 1 capsule by mouth 2 times daily 30 capsule      nitroGLYcerin (NITROSTAT) 0.4 MG sublingual tablet Place 1 tablet (0.4 mg) under the tongue every 5 minutes as needed for chest pain 25 tablet 0     order for DME Dispense SP Walker-small 1 each 0     order for DME Equipment being ordered: Dispense baffle, for use with nebulizer. 1 each 0     order for DME Equipment being ordered: Nebulizer. Use with Albuterol. 1 each 0     order for DME Equipment being ordered: Dispense face mask.  Mrs. Spicer is immunosuppressed due to rheumatoid arthritis. 1 Box 11     ranibizumab (LUCENTIS) 0.3 MG/0.05ML SOLN 0.05 mLs (0.3 mg) by Intravitreal route See Admin Instructions Every 6 weeks       ranitidine (ZANTAC) 150 MG tablet TAKE ONE TABLET BY MOUTH TWICE DAILY 60 tablet 11     saccharomyces boulardii (FLORASTOR) 250 MG capsule Take 1 capsule (250 mg) by mouth daily 14 capsule      senna-docusate (SENOKOT-S/PERICOLACE) 8.6-50 MG tablet Take 1 tablet by mouth daily as needed for constipation 100 tablet      trimethoprim (TRIMPEX) 100 MG tablet Take 0.5 tablets (50 mg) by mouth daily 45 tablet 1     vitamin C (ASCORBIC ACID) 500 MG tablet Take 1 tablet (500 mg) by mouth daily 30 tablet        PAST SURGICAL HISTORY:  Past Surgical History:   Procedure Laterality Date     ANESTHESIA CARDIOVERSION N/A 9/14/2018    Procedure: ANESTHESIA CARDIOVERSION;;  Surgeon: GENERIC ANESTHESIA PROVIDER;  Location: [facility] OR     ANESTHESIA CARDIOVERSION N/A 10/11/2018    Procedure: ANESTHESIA CARDIOVERSION;  Cardioversion;  Surgeon: GENERIC ANESTHESIA PROVIDER;  Location: [facility] OR      "ANESTHESIA CARDIOVERSION N/A 10/16/2018    Procedure: Anesthesia Cardioversion ;  Surgeon: GENERIC ANESTHESIA PROVIDER;  Location: [facility] OR     APPENDECTOMY       BIOPSY      hemorrhoidectomy     ENT SURGERY      tonsillectomy     GYN SURGERY      3 D & C's     HYSTERECTOMY, PAP NO LONGER INDICATED       LAMINECTOMY LUMBAR ONE LEVEL N/A 10/13/2015    Procedure: LAMINECTOMY LUMBAR ONE LEVEL;  Surgeon: Fransico Toussaint MD;  Location: [facility] OR       ALLERGIES:     Allergies   Allergen Reactions     Cephalexin Hcl Diarrhea     Gabapentin Other (See Comments)     Dizzsiness     Naproxen GI Disturbance     Perfume      Macrobid [Nitrofurantoin Anhydrous]      Possibly related to lung disease      Seasonal Allergies      Sulfa Drugs      Throat swelling     Xanax [Alprazolam] Other (See Comments)     Dizziness      Ciprofloxacin Itching and Rash       FAMILY HISTORY:  Family History   Problem Relation Age of Onset     Hypertension Mother      Psychotic Disorder Father      Diabetes Son      Diabetes Daughter      Blood Disease Daughter        SOCIAL HISTORY:  Social History     Tobacco Use     Smoking status: Never Smoker     Smokeless tobacco: Never Used   Substance Use Topics     Alcohol use: Yes     Comment: rare wine      Drug use: No       ROS:   A comprehensive 10 point review of systems negative other than as mentioned in HPI.  Exam:  /71 (BP Location: Left arm, Patient Position: Chair, Cuff Size: Adult Regular)   Pulse 71   Ht 1.626 m (5' 4\")   Wt 72.1 kg (159 lb)   LMP  (LMP Unknown)   SpO2 98%   BMI 27.29 kg/m     GENERAL APPEARANCE: alert and no distress  HEENT: no icterus, no xanthelasmas, normal pupil size and reaction, normal palate, mucosa moist, no central cyanosis  NECK: no adenopathy, no asymmetry, masses, or scars, thyroid normal to palpation and no bruits, JVP not elevated  RESPIRATORY: RUSSELL with talking, diminished in bases bilaterally.   CARDIOVASCULAR: regular, S1/S2, loud " murmur.  ABDOMEN: soft, non tender, bowel sounds normal, non-distended  EXTREMITIES: peripheral pulses normal, no edema  NEURO: alert and oriented to person/place/time, normal speech, gait and affect  SKIN: no ecchymoses, no rashes  PSYCH: normal affect, cooperative    Labs:  Reviewed.     Testing/Procedures:  PULMONARY FUNCTION TESTS:   PFT Latest Ref Rng & Units 2/14/2018   FVC L 1.85   FEV1 L 1.40   FVC% % 80   FEV1% % 80         6/2018 ECHOCARDIOGRAM (OSH REPORT):   Interpretation Summary    * The left ventricular systolic function is normal, estimated LVEF 65-70%.    * The left atrium is mildly dilated.    * There is mild concentric left ventricular hypertrophy.    * Sigmoid septum.    * pacemaker wire is in the right ventricle.    * There is mild aortic regurgitation.    * Compared to 02/07/2017 TTE, no major changes noted.  11/2018 ECHOCARDIOGRAM:  Interpretation Summary  Limited study.  Normal biventricular size and systolic function. The Ejection Fraction is  estimated at 55-60%.  Mild to moderate mitral insufficiency is present.  Mild to moderate tricuspid insufficiency is present.  Estimated pulmonary artery systolic pressure is 26 mmHg plus right atrial  pressure.  Compared to ECHO on 10/9, there are no significant changes. Mitral  regurgitation less prominent compared to recent YOLANDA (10/16).    Assessment and Plan:   Ms. 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 She presents today for follow up.     She was first diagnosed with AFL in 8/2016 and has has several recurrences over the years and developed tachy/lisa syndrome and had PPM implanted in 2017. Afterwards, she was restarted on Toprol XL and propofenone was added. She continued to have symptomatic recurrences with multiple ER  visits and DCCVs in 6/2018 and recently on 9/14/18. She went back into AF about 1.5 days later with recurrence of symptoms. She followed up with Dr. Loza on 9/17/18 and she continued to feel SOB, RUSSELL, fatigue, and some lightheadedness.  Decision was made to stop propafenone for 72 hours and start multaq 400 mg BID thereafter and scheduled DCCV. She then called and requested appointment with EP due to ongoing symptoms and lack of insurance coverage for multaq. She also has a cardiac history of HFpEF and has been on cozaar and metoprolol for this. Last echo in 5/2016 showed LVEF 65%, mild RV enlargement and mildly decreased RV function, diastolic dysfunction, moderate MR, moderately dilated LA, and evidence of pulmonary HTN. She does have known rheumatoid pulmonary fibrosis dating back to 1973. She had PFTs in 2015 that showed moderate lung diffusion defect. She has followed with Pulmonary Dr. Comer and had previously had sever dyspnea and hyperventilation in the past now showing significant improvement in ventilatory control and symptoms with improvement in PFTs and exercise tolerance. She has been tolerating low dose BB with her pulmonary status.  She was started on amiodarone. She was hospitalized 10/2018 for a HF exacerbation, was diuresed, and underwent a successful DCCV on 10/11/2018. She then followed up in clinic and was back in AF and arranged again for another DCCV hoping that the more amiodarone load would be helpful in maintaining sinus. She was then hospitalized 11/2018  for near-syncope while on the toliet (thought to be vasovagal/orthostatic in nature) with no evidence for new arrhythmia on device, and, per Device report, she had been AF/AFL for several days prior without knowledge or symptoms.  Her amiodarone appears to have been stopped during hospitalization in 11/2018. An echocardiogram 10/9/2018 normal LV function with EF 55-60%, mild pulmonary hypertension, and mild-moderate TI, mild-moderate  MR.YOLANDA 10/16/2018 showed progression to severe mitral insufficiency. Multiple MR jets noted. There is blunted systolic forward flow in 2 of the pulmonary veins. MR volume is estimated at 34ml, however this likely underestimates MR due to multiple jets. Referral was made to consider mireille clip. She continues to have intermittent dizziness, fatigue, dyspnea with activity (difficult to walk and with transferring self), dyspnea with talking.  She has been seeing [facility], SCr bumped and her diuretics were held. Device interrogation today shows 1 AT/AF episode recorded on 18 lasting > 100 hours in duration.  AF burden = 65%. Intrinsic rhythm = SB @ 46 bpm. AP = 35.8%.  = 62.7%.      We discussed in detail with the patient management/treatment options for Donato includin. Stroke Prophylaxis:  CHADSVASC=++age, +gender, +HTN  3, corresponding to a 3.2% annual stroke / systemic emolism event rate. indicating need for long term oral anticoagulation.  She is appropriately on Eliquis which has been renally dosed due to age, hx of variable renal function. She has had two mechanical falls resulting in bone fractures over the last year; however, no bleeding issues.   2. Rate Control: Appears to be adequately rate controlled. Continue Coreg can consider changing to diltiazem if BB an issue with her pulmonary disease; however, has tolerated well thus far.   3. Rhythm Control: Cardioversion, Antiarrhythmics and/or ablation are options for rhythm control. She has been reported to have AFL (tracings not available to us) and device interrogation now c/w AF. She had been on propafenone since 2017 with continued recurrences. Originally, multaq was going to be tried, but cost prohibitive as not covered by her insurance. Limited AAT options given variable renal function (prefer to avoid Sotalol or dofetilide). She was then started on amiodarone which was ineffective at maintaining sinus and was stopped in 2018.   4. Risk Factor  Management: maintain tight BP control and JESUS MANUEL evaluation as indicated.    Many of her symptoms appear to be related to severe MR and agree with mireille clip evaluations.     Diastolic Heart Failure:  Diastolic heart failure is caused by any disease process that causes stiffening of the left ventricular resulting in abnormal relaxation and impaired LV filling. Common causes include long standing hypertension, valvular heart disease, diabetes and age. There is no specific therapy to improve LV diastolic dysfunction however diuretic management plays a role in preventing volume overload and subsequent exacerbations. Treatment of the underlying disease in currently the most important therapeutic approach. Many of her symptoms appear to be related to her severe MR and agree with mireille clip evaluations. She will need tight volume control. We will resume her lasix 20 mg BID with BMP in 1 week. She will also need tight HR and BP control.     She will follow up with [facility] clinic in 2 weeks and would recommend increasing BP meds if able at that visit. We will have her follow up with Minna Gallagher CNP in 2-3 months and can follow up with EP MD in 6 months.     The patient states understanding and is agreeable with plan.   This patient was seen and evaluated with Dr. Carrasquillo. The above note reflects our joint assessment and plan.   SUNNI Ortiz CNP  Pager: [phone]    EP STAFF NOTE  I have seen and examined the patient as part of a shared visit with RAAD Ortiz NP.  I agree with the note above. I reviewed today's vital signs and medications. I have reviewed and discussed with the advanced practice provider their physical examination, assessment, and plan   Briefly, symptoms mostly driven by her severe MR  My key history/exam findings are: RRR.   The key management decisions made by me: consider mitraclip, close follow-up.    Franki Carrasquillo MD [facility]  Cardiology - Electrophysiology

## 2018-12-19 NOTE — PATIENT INSTRUCTIONS
It was a pleasure to see you in clinic today.  Please do not hesitate to call with any questions or concerns.  You are scheduled for a remote transmission on 3/20/19.  We look forward to seeing you in clinic at your next device check in 6 months.    Rose Marquez, RN, MS, CCRN  Electrophysiology Nurse Clinician  [facility]    During Business Hours Please Call:  [phone]  After Hours Please Call:  [phone] - select option #4 and ask for job code [phone]

## 2018-12-19 NOTE — NURSING NOTE
Chief Complaint   Patient presents with     Follow Up     Follow-up AF, off amio. Inpt 11/28 - 12/4 r/t HFpEF exacerbation, presyncope, SOB likely multifactorial given HFpEF, pulmonary fibrosis, AF.      Vitals were taken and medications were reconciled    Swathi Stacy MA    4:26 PM

## 2018-12-19 NOTE — PATIENT INSTRUCTIONS
Cardiology Provider you saw in clinic today: Dr. Carrasquillo    Medication Changes:     1. Lasix 20mg twice a day    Labs/Tests needed:     1. Labs next week     Follow-up Visit:  See [facility] in 2-3 weeks. 3 months with Minna Gallagher NP with a device check. Referral to structural heart clinic/Mitraclip.           Please contact us via [employer] for questions or concerns.    Karol Perez RN   Electrophysiology Nurse Coordinator.  [phone]    If your question concerns the schedule/appointment times, contact:  [facility] Procedure   [phone]    Device Clinic (Pacemakers, ICD, Loop Recorders)   [phone]      You will receive all normal lab and testing results via [email or web address] or mail if not reviewed in clinic today.   If you need a medication refill please contact your pharmacy.    As always, thank you for trusting us with your health care needs!

## 2018-12-19 NOTE — PROGRESS NOTES
"Electrophysiology Clinic Note  HPI:   Ms. 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 She presents today for follow up.     She was first diagnosed with AFL in 8/2016 when she presented with chest pain, dizziness, and lightheadedness. She was found to be in AFL and converted back to sinus while in the ER. She was started on Toprol XL and her losartan was decreased. Anticoagulation was deferred until Cardiology follow up. She was then started on Eliquis in 11/2016. She had recurrent AFL in 2/2017 and her beta blocker was increased. However, she developed symptomatic bradycardia and presyncope and her beta blocker was stopped. She then developed RVR again and had a PPM implanted for tachy/lisa syndrome at [facility]. Afterwards, she was restarted on Toprol XL and propofenone was added. She then had recurrent ER visits in 11/2017, 1/2018, and 2/2018 with palpitations, lightheadedness, and shortness of breath. Her device interrogation showed 7% AF burden up to 3 days corresponding to symptoms. She was then admitted to [facility] in 6/2018 after experiencing a fall and fractured her coccyx. She was in AF during that hospitalization and required DCCV. She had another fall in 7/2018 resulting in fractured S3. She then presented on 9/13/18 to [facility] with exhaustion, SOB, and some palpitations, She was back in AF. She underwent DCCV on 9/14/18. She stated that she had immediate improvement in her symptoms and \"felt like a new person.\" Then on 9/16/18, she awoke feeling worse and felt she was back in AF. Device transmission confirmed recurrence of AF. She followed up with Dr. Loza on 9/17/18 and she continued to feel SOB, RUSSELL, fatigue, and some lightheadedness.  Decision was made to stop " propafenone for 72 hours and start multaq 400 mg BID thereafter and scheduled DCCV. She then called and requested appointment with EP due to ongoing symptoms and lack of insurance coverage for multaq. She also has a cardiac history of HFpEF and has been on cozaar and metoprolol for this. Last echo in 5/2016 showed LVEF 65%, mild RV enlargement and mildly decreased RV function, diastolic dysfunction, moderate MR, moderately dilated LA, and evidence of pulmonary HTN. Last echo from OSH showed LVEF 65-70%, midly dilated LA, sigmoid septum, mild LVH, and mild AR. She does have known rheumatoid pulmonary fibrosis dating back to 1973. She had PFTs in 2015 that showed moderate lung diffusion defect. She has followed with Pulmonary Dr. Comer and had previously had sever dyspnea and hyperventilation in the past now showing significant improvement in ventilatory control and symptoms with improvement in PFTs and exercise tolerance. She has been tolerating low dose BB with her pulmonary status.     She was seen in EP clinic on 9/19/18 for AF management. She reported feeling extremely tired, decreased stamina, and RUSSELL which worsened with the onset of AF. Device interrogation showed normal pacemaker function. She went back into AF on 9/16/18 @ 0642. AP = 0.6%.   = 79.4%. Decision was made to start amiodarone and pursue DCCV.     She was hospitalized 10/2018 for a HF exacerbation, was diuresed, and underwent a successful DCCV on 10/11/2018. She then followed up in clinic and was back in AF and arranged again for another DCCV hoping that the more amiodarone load would be helpful in maintaining sinus. She was then hospitalized 11/2018  for near-syncope while on the toliet (thought to be vasovagal/orthostatic in nature) with no evidence for new arrhythmia on device, and, per Device report, she had been AF/AFL for several days prior without knowledge or symptoms.  Her amiodarone appears to have been stopped during hospitalization  in 11/2018. An echocardiogram 10/9/2018 normal LV function with EF 55-60%, mild pulmonary hypertension, and mild-moderate TI, mild-moderate MR.YOLANDA 10/16/2018 showed progression to severe mitral insufficiency. Multiple MR jets noted. There is blunted systolic forward flow in 2 of the pulmonary veins. MR volume is estimated at 34ml, however this likely underestimates MR due to multiple jets. Referral was made to consider mireille clip.     She presents today for follow up. She continues to have intermittent dizziness, fatigue, dyspnea with activity (difficult to walk and with transferring self), dyspnea with talking. She has been seeing [facility], SCr bumped and her diuretics were held. Device interrogation today shows 1 AT/AF episode recorded on 12/8/18 lasting > 100 hours in duration.  AF burden = 65%. Intrinsic rhythm = SB @ 46 bpm. AP = 35.8%.  = 62.7%. She denies any chest pain/pressures, leg/ankle swelling, PND, orthopnea, or syncopal symptoms. Current cardiac medications include: Coreg, Eliquis and spironolactone.     PAST MEDICAL HISTORY:  Past Medical History:   Diagnosis Date     Adjustment disorder with anxious mood 5/18/2015     Advanced directives, counseling/discussion 8/30/2012    Patient states has Advance Directive and will bring in a copy to clinic. 8/30/2012   Tevin May  [facility] Medical Assistant \       Anemia 9/25/2015     Basal cell cancer      CHF (congestive heart failure) (H) 9/18/2014     CKD (chronic kidney disease) stage 3, GFR 30-59 ml/min (H) 9/29/2015     DDD (degenerative disc disease), lumbar 9/25/2015     Diffuse idiopathic pulmonary fibrosis (H) 5/6/2013     Encounter for palliative care 5/18/2015     History of blood transfusion 9/29/2015     Hypertension goal BP (blood pressure) < 140/90 9/7/2012     Hypothyroid 9/7/2012     Irritable bowel syndrome 10/29/2013     Macular degeneration      Macular degeneration, 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     Nondisplaced spiral fracture of shaft of humerus       Osteoporosis 8/13/2013     Imo Update utility     RA (rheumatoid arthritis) (H) 5/7/2013     Rheumatic fever      Shingles      Spinal stenosis of lumbar region with neurogenic claudication 9/14/2015       CURRENT MEDICATIONS:  Current Outpatient Medications   Medication Sig Dispense Refill     abatacept (ORENCIA) 250 MG injection Inject 750 mg into the vein every 28 days 3 each 0     acetaminophen (TYLENOL) 500 MG tablet Take 1 tablet (500 mg) by mouth every 6 hours as needed for mild pain or fever       albuterol (PROVENTIL) (2.5 MG/3ML) 0.083% neb solution Take 1 vial (2.5 mg) by nebulization every 6 hours as needed for shortness of breath / dyspnea or wheezing 360 mL      apixaban ANTICOAGULANT (ELIQUIS) 2.5 MG tablet Take 1 tablet (2.5 mg) by mouth 2 times daily 180 tablet 3     azaTHIOprine (IMURAN) 50 MG tablet Take 1 tablet (50 mg) by mouth daily 90 tablet 2     Blood Pressure Monitor KIT 1 kit daily as needed 1 kit 0     calcium carbonate-vitamin D (OS-FATOUMATA) 500-400 MG-UNIT tablet Take 1 tablet by mouth daily 60 tablet      Carboxymethylcellulose Sod PF (CELLUVISC/REFRESH LIQUIGEL) 1 % ophthalmic gel Place 1 drop into both eyes daily as needed for dry eyes 1 Bottle      carvedilol (COREG) 3.125 MG tablet Take 1 tablet (3.125 mg) by mouth 2 times daily (with meals) 60 tablet 11     cetirizine (ZYRTEC ALLERGY) 10 MG tablet Take 1 tablet (10 mg) by mouth At Bedtime 30 tablet      denosumab (PROLIA) 60 MG/ML SOLN injection Inject 1 mL (60 mg) Subcutaneous every 6 months 1 mL      folic acid (FOLVITE) 1 MG tablet Take 1 tablet (1 mg) by mouth daily 90 tablet 3     furosemide (LASIX) 20 MG tablet Hold Lasix 12/18 and 12/19, then start 20 mg daily after that. 90 tablet 3     hypromellose (GENTEAL) 0.3 % SOLN ophthalmic solution Place 1 drop into both eyes daily as needed for dry eyes       levothyroxine (SYNTHROID/LEVOTHROID) 75 MCG tablet Take 1 tablet (75 mcg) by mouth daily 90 tablet 1     Multiple  Vitamins-Minerals (PRESERVISION AREDS) CAPS Take 1 capsule by mouth 2 times daily 30 capsule      nitroGLYcerin (NITROSTAT) 0.4 MG sublingual tablet Place 1 tablet (0.4 mg) under the tongue every 5 minutes as needed for chest pain 25 tablet 0     order for DME Dispense SP Walker-small 1 each 0     order for DME Equipment being ordered: Dispense baffle, for use with nebulizer. 1 each 0     order for DME Equipment being ordered: Nebulizer. Use with Albuterol. 1 each 0     order for DME Equipment being ordered: Dispense face mask.  Mrs. Spicer is immunosuppressed due to rheumatoid arthritis. 1 Box 11     ranibizumab (LUCENTIS) 0.3 MG/0.05ML SOLN 0.05 mLs (0.3 mg) by Intravitreal route See Admin Instructions Every 6 weeks       ranitidine (ZANTAC) 150 MG tablet TAKE ONE TABLET BY MOUTH TWICE DAILY 60 tablet 11     saccharomyces boulardii (FLORASTOR) 250 MG capsule Take 1 capsule (250 mg) by mouth daily 14 capsule      senna-docusate (SENOKOT-S/PERICOLACE) 8.6-50 MG tablet Take 1 tablet by mouth daily as needed for constipation 100 tablet      trimethoprim (TRIMPEX) 100 MG tablet Take 0.5 tablets (50 mg) by mouth daily 45 tablet 1     vitamin C (ASCORBIC ACID) 500 MG tablet Take 1 tablet (500 mg) by mouth daily 30 tablet        PAST SURGICAL HISTORY:  Past Surgical History:   Procedure Laterality Date     ANESTHESIA CARDIOVERSION N/A 9/14/2018    Procedure: ANESTHESIA CARDIOVERSION;;  Surgeon: GENERIC ANESTHESIA PROVIDER;  Location: [facility] OR     ANESTHESIA CARDIOVERSION N/A 10/11/2018    Procedure: ANESTHESIA CARDIOVERSION;  Cardioversion;  Surgeon: GENERIC ANESTHESIA PROVIDER;  Location: [facility] OR     ANESTHESIA CARDIOVERSION N/A 10/16/2018    Procedure: Anesthesia Cardioversion ;  Surgeon: GENERIC ANESTHESIA PROVIDER;  Location: [facility] OR     APPENDECTOMY       BIOPSY      hemorrhoidectomy     ENT SURGERY      tonsillectomy     GYN SURGERY      3 D & C's     HYSTERECTOMY, PAP NO LONGER INDICATED       LAMINECTOMY LUMBAR ONE LEVEL  "N/A 10/13/2015    Procedure: LAMINECTOMY LUMBAR ONE LEVEL;  Surgeon: Fransico Toussaint MD;  Location: [facility] OR       ALLERGIES:     Allergies   Allergen Reactions     Cephalexin Hcl Diarrhea     Gabapentin Other (See Comments)     Dizzsiness     Naproxen GI Disturbance     Perfume      Macrobid [Nitrofurantoin Anhydrous]      Possibly related to lung disease      Seasonal Allergies      Sulfa Drugs      Throat swelling     Xanax [Alprazolam] Other (See Comments)     Dizziness      Ciprofloxacin Itching and Rash       FAMILY HISTORY:  Family History   Problem Relation Age of Onset     Hypertension Mother      Psychotic Disorder Father      Diabetes Son      Diabetes Daughter      Blood Disease Daughter        SOCIAL HISTORY:  Social History     Tobacco Use     Smoking status: Never Smoker     Smokeless tobacco: Never Used   Substance Use Topics     Alcohol use: Yes     Comment: rare wine      Drug use: No       ROS:   A comprehensive 10 point review of systems negative other than as mentioned in HPI.  Exam:  /71 (BP Location: Left arm, Patient Position: Chair, Cuff Size: Adult Regular)   Pulse 71   Ht 1.626 m (5' 4\")   Wt 72.1 kg (159 lb)   LMP  (LMP Unknown)   SpO2 98%   BMI 27.29 kg/m    GENERAL APPEARANCE: alert and no distress  HEENT: no icterus, no xanthelasmas, normal pupil size and reaction, normal palate, mucosa moist, no central cyanosis  NECK: no adenopathy, no asymmetry, masses, or scars, thyroid normal to palpation and no bruits, JVP not elevated  RESPIRATORY: RUSSELL with talking, diminished in bases bilaterally.   CARDIOVASCULAR: regular, S1/S2, loud murmur.  ABDOMEN: soft, non tender, bowel sounds normal, non-distended  EXTREMITIES: peripheral pulses normal, no edema  NEURO: alert and oriented to person/place/time, normal speech, gait and affect  SKIN: no ecchymoses, no rashes  PSYCH: normal affect, cooperative    Labs:  Reviewed.     Testing/Procedures:  PULMONARY FUNCTION TESTS:   PFT " Latest Ref Rng & Units 2/14/2018   FVC L 1.85   FEV1 L 1.40   FVC% % 80   FEV1% % 80         6/2018 ECHOCARDIOGRAM ([facility] REPORT):   Interpretation Summary    * The left ventricular systolic function is normal, estimated LVEF 65-70%.    * The left atrium is mildly dilated.    * There is mild concentric left ventricular hypertrophy.    * Sigmoid septum.    * pacemaker wire is in the right ventricle.    * There is mild aortic regurgitation.    * Compared to 02/07/2017 TTE, no major changes noted.  11/2018 ECHOCARDIOGRAM:  Interpretation Summary  Limited study.  Normal biventricular size and systolic function. The Ejection Fraction is  estimated at 55-60%.  Mild to moderate mitral insufficiency is present.  Mild to moderate tricuspid insufficiency is present.  Estimated pulmonary artery systolic pressure is 26 mmHg plus right atrial  pressure.  Compared to ECHO on 10/9, there are no significant changes. Mitral  regurgitation less prominent compared to recent YOLANDA (10/16).    Assessment and Plan:   Ms. 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 She presents today for follow up.     She was first diagnosed with AFL in 8/2016 and has has several recurrences over the years and developed tachy/lsia syndrome and had PPM implanted in 2017. Afterwards, she was restarted on Toprol XL and propofenone was added. She continued to have symptomatic recurrences with multiple ER visits and DCCVs in 6/2018 and recently on 9/14/18. She went back into AF about 1.5 days later with recurrence of symptoms. She followed up with Dr. Loza on 9/17/18 and she continued to feel SOB, RUSSELL, fatigue, and some lightheadedness.  Decision was made to stop propafenone for 72 hours and start multaq 400 mg BID thereafter and scheduled  DCCV. She then called and requested appointment with EP due to ongoing symptoms and lack of insurance coverage for multaq. She also has a cardiac history of HFpEF and has been on cozaar and metoprolol for this. Last echo in 5/2016 showed LVEF 65%, mild RV enlargement and mildly decreased RV function, diastolic dysfunction, moderate MR, moderately dilated LA, and evidence of pulmonary HTN. She does have known rheumatoid pulmonary fibrosis dating back to 1973. She had PFTs in 2015 that showed moderate lung diffusion defect. She has followed with Pulmonary Dr. Comer and had previously had sever dyspnea and hyperventilation in the past now showing significant improvement in ventilatory control and symptoms with improvement in PFTs and exercise tolerance. She has been tolerating low dose BB with her pulmonary status.  She was started on amiodarone. She was hospitalized 10/2018 for a HF exacerbation, was diuresed, and underwent a successful DCCV on 10/11/2018. She then followed up in clinic and was back in AF and arranged again for another DCCV hoping that the more amiodarone load would be helpful in maintaining sinus. She was then hospitalized 11/2018  for near-syncope while on the toliet (thought to be vasovagal/orthostatic in nature) with no evidence for new arrhythmia on device, and, per Device report, she had been AF/AFL for several days prior without knowledge or symptoms.  Her amiodarone appears to have been stopped during hospitalization in 11/2018. An echocardiogram 10/9/2018 normal LV function with EF 55-60%, mild pulmonary hypertension, and mild-moderate TI, mild-moderate MR.YOLANDA 10/16/2018 showed progression to severe mitral insufficiency. Multiple MR jets noted. There is blunted systolic forward flow in 2 of the pulmonary veins. MR volume is estimated at 34ml, however this likely underestimates MR due to multiple jets. Referral was made to consider mireille clip. She continues to have intermittent dizziness,  fatigue, dyspnea with activity (difficult to walk and with transferring self), dyspnea with talking. She has been seeing [facility], SCr bumped and her diuretics were held. Device interrogation today shows 1 AT/AF episode recorded on 18 lasting > 100 hours in duration.  AF burden = 65%. Intrinsic rhythm = SB @ 46 bpm. AP = 35.8%.  = 62.7%.      We discussed in detail with the patient management/treatment options for Donato includin. Stroke Prophylaxis:  CHADSVASC=++age, +gender, +HTN  3, corresponding to a 3.2% annual stroke / [facility] rate. indicating need for long term oral anticoagulation.  She is appropriately on Eliquis which has been renally dosed due to age, hx of variable renal function. She has had two mechanical falls resulting in bone fractures over the last year; however, no bleeding issues.   2. Rate Control: Appears to be adequately rate controlled. Continue Coreg can consider changing to diltiazem if BB an issue with her pulmonary disease; however, has tolerated well thus far.   3. Rhythm Control: Cardioversion, Antiarrhythmics and/or ablation are options for rhythm control. She has been reported to have AFL (tracings not available to us) and device interrogation now c/w AF. She had been on propafenone since 2017 with continued recurrences. Originally, multaq was going to be tried, but cost prohibitive as not covered by her insurance. Limited AAT options given variable renal function (prefer to avoid Sotalol or dofetilide). She was then started on amiodarone which was ineffective at maintaining sinus and was stopped in 2018.   4. Risk Factor Management: maintain tight BP control and JESUS MANUEL evaluation as indicated.    Many of her symptoms appear to be related to severe MR and agree with mireille clip evaluations.     Diastolic Heart Failure:  Diastolic heart failure is caused by any disease process that causes stiffening of the left ventricular resulting in abnormal relaxation and  impaired LV filling. Common causes include long standing hypertension, valvular heart disease, diabetes and age. There is no specific therapy to improve LV diastolic dysfunction however diuretic management plays a role in preventing volume overload and subsequent exacerbations. Treatment of the underlying disease in currently the most important therapeutic approach. Many of her symptoms appear to be related to her severe MR and agree with mireille clip evaluations. She will need tight volume control. We will resume her lasix 20 mg BID with BMP in 1 week. She will also need tight HR and BP control.     She will follow up with [facility] clinic in 2 weeks and would recommend increasing BP meds if able at that visit. We will have her follow up with Minna Gallagher CNP in 2-3 months and can follow up with EP MD in 6 months.     The patient states understanding and is agreeable with plan.   This patient was seen and evaluated with Dr. Carrasquillo. The above note reflects our joint assessment and plan.   SUNNI Ortiz CNP  Pager: [phone]    EP STAFF NOTE  I have seen and examined the patient as part of a shared visit with RAAD Ortiz NP.  I agree with the note above. I reviewed today's vital signs and medications. I have reviewed and discussed with the advanced practice provider their physical examination, assessment, and plan   Briefly, symptoms mostly driven by her severe MR  My key history/exam findings are: RRR.   The key management decisions made by me: consider mitraclip, close follow-up.    Franki Carrasquillo MD [facility]  Cardiology - Electrophysiology

## 2018-12-20 ENCOUNTER — OFFICE VISIT (OUTPATIENT)
Dept: CARDIOLOGY | Facility: CLINIC | Age: 83
End: 2018-12-20
Attending: SURGERY
Payer: MEDICARE

## 2018-12-20 ENCOUNTER — TELEPHONE (OUTPATIENT)
Dept: CARDIOLOGY | Facility: CLINIC | Age: 83
End: 2018-12-20

## 2018-12-20 ENCOUNTER — OFFICE VISIT (OUTPATIENT)
Dept: CARDIOLOGY | Facility: CLINIC | Age: 83
End: 2018-12-20
Attending: INTERNAL MEDICINE
Payer: MEDICARE

## 2018-12-20 VITALS
WEIGHT: 162 LBS | SYSTOLIC BLOOD PRESSURE: 177 MMHG | DIASTOLIC BLOOD PRESSURE: 81 MMHG | HEIGHT: 64 IN | BODY MASS INDEX: 27.66 KG/M2 | HEART RATE: 70 BPM | OXYGEN SATURATION: 97 %

## 2018-12-20 VITALS
DIASTOLIC BLOOD PRESSURE: 81 MMHG | OXYGEN SATURATION: 97 % | SYSTOLIC BLOOD PRESSURE: 177 MMHG | BODY MASS INDEX: 27.66 KG/M2 | HEART RATE: 70 BPM | WEIGHT: 162 LBS | HEIGHT: 64 IN

## 2018-12-20 DIAGNOSIS — I34.0 NON-RHEUMATIC MITRAL REGURGITATION: Primary | ICD-10-CM

## 2018-12-20 PROCEDURE — 99203 OFFICE O/P NEW LOW 30 MIN: CPT | Mod: ZP | Performed by: INTERNAL MEDICINE

## 2018-12-20 ASSESSMENT — PAIN SCALES - GENERAL
PAINLEVEL: NO PAIN (0)
PAINLEVEL: NO PAIN (0)

## 2018-12-20 ASSESSMENT — MIFFLIN-ST. JEOR
SCORE: 1146.89
SCORE: 1146.89

## 2018-12-20 NOTE — TELEPHONE ENCOUNTER
Patient was seen yesterday by Dr. Carrasquillo and today by Dr. Quigley.  Patient is going forward with getting ready to have the mitral valve clip.  Ok to close this encounter.  Dina Swan RN

## 2018-12-20 NOTE — LETTER
12/20/2018      RE: Linda Spicer  [address] [facility] [address]       Dear Colleague,    Thank you for the opportunity to participate in the care of your patient, Linda Spicer, at the [facility] HEART [facility]. Please see a copy of my visit note below.    [facility] CARDIOTHORACIC SURGERY CONSULT     Linda Spicer MRN# [id number]   YOB: 1931 Age: 87 year old      Date of Admission:  (Not on file)    Primary care provider: Tre Lockett         Chief Complaint:   Severe mitral regurgitation, Atrial fibrillation     History of Present Illness: Linda Spicer is a 87 year old female with multiple recent hospital admissions for atrial fibrillation and heart failure.  Recent echocardiogram showed severe mitral regurgitation.  She was referred to clinic for evaluation for surgery versus mitral clip.    She is seen today in clinic with her daughter present.  She is a pleasant elderly female.  She is not interested in surgery for her mitral regurgitation.  She is interested in investigating possible mitral clip.    I have asked Dr. Avila Watson to see the patient today as well.  Please refer to his note for further details of his evaluation.           Assessment and Plan:   Linda Spicer is a 87 year old female with severe mitral regurgitation and atrial fibrillation  1) Not a candidate for surgical intervention given age, frailty, comorbidities  2) Would consider mitral clip - please refer to Dr. Watson's note   3) Please call with questions or concerns.     Thank you for the opportunity to participate in the care of this patient.           Ruiz Quigley MD  Cardiothoracic Surgery  [phone]                Past Medical History:     Past Medical History:   Diagnosis Date     Adjustment disorder with anxious mood 5/18/2015     Advanced directives, counseling/discussion 8/30/2012    Patient states has Advance Directive and  will bring in a copy to clinic. 8/30/2012   Tevin May  [facility] Medical Assistant \       Anemia 9/25/2015     Basal cell cancer      CHF (congestive heart failure) (H) 9/18/2014     CKD (chronic kidney disease) stage 3, GFR 30-59 ml/min (H) 9/29/2015     DDD (degenerative disc disease), lumbar 9/25/2015     Diffuse idiopathic pulmonary fibrosis (H) 5/6/2013     Encounter for palliative care 5/18/2015     History of blood transfusion 9/29/2015     Hypertension goal BP (blood pressure) < 140/90 9/7/2012     Hypothyroid 9/7/2012     Irritable bowel syndrome 10/29/2013     Macular degeneration      Macular degeneration, 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     Nondisplaced spiral fracture of shaft of humerus      Osteoporosis 8/13/2013     Imo Update utility     RA (rheumatoid arthritis) (H) 5/7/2013     Rheumatic fever      Shingles      Spinal stenosis of lumbar region with neurogenic claudication 9/14/2015               Past Surgical History:     Past Surgical History:   Procedure Laterality Date     ANESTHESIA CARDIOVERSION N/A 9/14/2018    Procedure: ANESTHESIA CARDIOVERSION;;  Surgeon: GENERIC ANESTHESIA PROVIDER;  Location: [facility] OR     ANESTHESIA CARDIOVERSION N/A 10/11/2018    Procedure: ANESTHESIA CARDIOVERSION;  Cardioversion;  Surgeon: GENERIC ANESTHESIA PROVIDER;  Location: [facility] OR     ANESTHESIA CARDIOVERSION N/A 10/16/2018    Procedure: Anesthesia Cardioversion ;  Surgeon: GENERIC ANESTHESIA PROVIDER;  Location: [facility] OR     APPENDECTOMY       BIOPSY      hemorrhoidectomy     ENT SURGERY      tonsillectomy     GYN SURGERY      3 D & C's     HYSTERECTOMY, PAP NO LONGER INDICATED       LAMINECTOMY LUMBAR ONE LEVEL N/A 10/13/2015    Procedure: LAMINECTOMY LUMBAR ONE LEVEL;  Surgeon: Fransico Toussaint MD;  Location: [facility] OR               Social History:     Social History     Socioeconomic History     Marital status:      Spouse name: Not on file     Number of children: Not on file     Years of education: Not on file      Highest education level: Not on file   Social Needs     Financial resource strain: Not on file     Food insecurity - worry: Not on file     Food insecurity - inability: Not on file     Transportation needs - medical: Not on file     Transportation needs - non-medical: Not on file   Occupational History     Not on file   Tobacco Use     Smoking status: Never Smoker     Smokeless tobacco: Never Used   Substance and Sexual Activity     Alcohol use: Yes     Comment: rare wine      Drug use: No     Sexual activity: No   Other Topics Concern     Parent/sibling w/ CABG, MI or angioplasty before 65F 55M? Not Asked   Social History Narrative    . Has six children. She enjoys [employer] and [employer].  passed away.  Her son has financial and alcohol issues.               Family History:     Family History   Problem Relation Age of Onset     Hypertension Mother      Psychotic Disorder Father      Diabetes Son      Diabetes Daughter      Blood Disease Daughter               Allergies:      Allergies   Allergen Reactions     Cephalexin Hcl Diarrhea     Gabapentin Other (See Comments)     Dizzsiness     Naproxen GI Disturbance     Perfume      Macrobid [Nitrofurantoin Anhydrous]      Possibly related to lung disease      Seasonal Allergies      Sulfa Drugs      Throat swelling     Xanax [Alprazolam] Other (See Comments)     Dizziness      Ciprofloxacin Itching and Rash               Medications:     Current Outpatient Medications   Medication     abatacept (ORENCIA) 250 MG injection     acetaminophen (TYLENOL) 500 MG tablet     albuterol (PROVENTIL) (2.5 MG/3ML) 0.083% neb solution     apixaban ANTICOAGULANT (ELIQUIS) 2.5 MG tablet     azaTHIOprine (IMURAN) 50 MG tablet     Blood Pressure Monitor KIT     calcium carbonate-vitamin D (OS-FATOUMATA) 500-400 MG-UNIT tablet     Carboxymethylcellulose Sod PF (CELLUVISC/REFRESH LIQUIGEL) 1 % ophthalmic gel     carvedilol (COREG) 3.125 MG tablet     cetirizine (ZYRTEC ALLERGY) 10 MG  tablet     denosumab (PROLIA) 60 MG/ML SOLN injection     folic acid (FOLVITE) 1 MG tablet     furosemide (LASIX) 20 MG tablet     hypromellose (GENTEAL) 0.3 % SOLN ophthalmic solution     levothyroxine (SYNTHROID/LEVOTHROID) 75 MCG tablet     Multiple Vitamins-Minerals (PRESERVISION AREDS) CAPS     nitroGLYcerin (NITROSTAT) 0.4 MG sublingual tablet     order for DME     order for DME     order for DME     order for DME     ranibizumab (LUCENTIS) 0.3 MG/0.05ML SOLN     ranitidine (ZANTAC) 150 MG tablet     saccharomyces boulardii (FLORASTOR) 250 MG capsule     senna-docusate (SENOKOT-S/PERICOLACE) 8.6-50 MG tablet     trimethoprim (TRIMPEX) 100 MG tablet     vitamin C (ASCORBIC ACID) 500 MG tablet     No current facility-administered medications for this visit.              Review of Systems:   A 10 point ROS was performed and is negative other than HPI.          Physical Exam:           Gen: NAD, resting comfortably in bed  Lungs: CTAB, non-labored breathing  CV: regular rhythm, normal rate  Abd: soft, not tender, not distended  Ext: motor, sensation, pulses intact  Neuro: AOx3    Labs:  Lab Results   Component Value Date    WBC 5.9 12/03/2018     Lab Results   Component Value Date    RBC 3.22 12/03/2018     Lab Results   Component Value Date    HGB 10.7 12/03/2018     Lab Results   Component Value Date    HCT 32.6 12/03/2018     No components found for: MCT  Lab Results   Component Value Date     12/03/2018     Lab Results   Component Value Date    MCH 33.2 12/03/2018     Lab Results   Component Value Date    MCHC 32.8 12/03/2018     Lab Results   Component Value Date    RDW 13.0 12/03/2018     Lab Results   Component Value Date     12/03/2018     Last Comprehensive Metabolic Panel:  Sodium   Date Value Ref Range Status   12/26/2018 138 133 - 144 mmol/L Final     Potassium   Date Value Ref Range Status   12/26/2018 4.0 3.4 - 5.3 mmol/L Final     Chloride   Date Value Ref Range Status   12/26/2018 104  94 - 109 mmol/L Final     Carbon Dioxide   Date Value Ref Range Status   12/26/2018 28 20 - 32 mmol/L Final     Anion Gap   Date Value Ref Range Status   12/26/2018 6 3 - 14 mmol/L Final     Glucose   Date Value Ref Range Status   12/26/2018 94 70 - 99 mg/dL Final     Comment:     Non Fasting     Urea Nitrogen   Date Value Ref Range Status   12/26/2018 29 7 - 30 mg/dL Final     Creatinine   Date Value Ref Range Status   12/26/2018 1.43 (H) 0.52 - 1.04 mg/dL Final     GFR Estimate   Date Value Ref Range Status   12/26/2018 33 (L) >60 mL/min/[1.73_m2] Final     Comment:     Non  GFR Calc  Starting 12/18/2018, serum creatinine based estimated GFR (eGFR) will be   calculated using the Chronic Kidney Disease Epidemiology Collaboration   (CKD-EPI) equation.       Calcium   Date Value Ref Range Status   12/26/2018 8.7 8.5 - 10.1 mg/dL Final       Imaging:  Transesophageal echocardiogram (10/16/18):  Interpretation Summary  The left atrial appendage is normal. It is free of spontaneous echo contrast  and thrombus.  No transgastric images obtained due to patient discomfort.     The Ejection Fraction is estimated at 55-60%.  Global right ventricular function is normal.  Moderate to severe mitral insufficiency is present.  Moderate tricuspid insufficiency is present.  Multiple MR jets noted. There is blunted systolic forward flow in 2 of the  pulmonary veins. MR volume is estimated at 34ml, however this likely  underestimates MR due to multiple jets.     Compared to prior TTE on 10/9/2018, MR appears to be worse.  _____________________________________________________________________________  __        Procedure  Transesophageal Echocardiogram with color and spectral Doppler performed. 3D  image acquisition, reconstruction, and real-time interpretation was performed.  Procedure location Echo Lab. Informed consent for Transesophegeal echo  obtained. YOLANDA Probe #51 was used during the procedure. Patient was  sedated  using Fentanyl 25 mcg. Patient was sedated using Versed 1 mg. I determined  this patient to be an appropriate candidate for the planned sedation and  procedure and have reassessed the patient immediately prior to sedation and  procedure. Total sedation time: 22 minutes of continuous bedside 1:1  monitoring. The Transducer was inserted without difficulty . The patient  tolerated the procedure well. Complications None. The patient's rhythm is  atrial fibrillation. Good quality two-dimensional was performed and  interpreted.     Left Ventricle  The Ejection Fraction is estimated at 55-60%. No regional wall motion  abnormalities are seen. Left ventricular function, chamber size, wall motion,  and wall thickness are normal.The EF is 55-60%.     Right Ventricle  The right ventricle is normal size. Global right ventricular function is  normal. A pacemaker lead is noted in the right ventricle.     Atria  The left atrial appendage is normal. It is free of spontaneous echo contrast  and thrombus. Moderate left atrial enlargement is present. Mild right atrial  enlargement is present. The atrial septum is intact as assessed by color  Doppler . A pacemaker lead is noted in the right atrium.        Mitral Valve  Moderate to severe mitral insufficiency is present. Multiple MR jets noted.  There is blunted systolic forward flow in 2 of the pulmonary veins. MR volume  is estimated at 34ml, however this likely underestimates MR due to multiple  jets.     Aortic Valve  The aortic valve is tricuspid. Trace to mild aortic insufficiency is present.     Tricuspid Valve  The tricuspid valve is normal. Moderate tricuspid insufficiency is present.  Right ventricular systolic pressure is 31mmHg above the right atrial pressure.     Pulmonic Valve  The pulmonic valve is normal. Trace pulmonic insufficiency is present.     Vessels  Moderate to severe focal plaque noted in the descending aorta and arch. The  aorta root is normal. Normal  pulmonary arteries.        Pericardium  No pericardial effusion is present.     _____________________________________________________________________________  __  Doppler Measurements & Calculations  TR max adrian: 278.0 cm/sec  TR max P.9 mmHg      Ruiz Quigley MD

## 2018-12-20 NOTE — TELEPHONE ENCOUNTER
[facility] Call Center    Phone Message    May a detailed message be left on voicemail: yes    Reason for Call: Other: Per call from PT's son PT resumed her Lasix this morning, has gained 3 pounds since yesterday with symptom of dizziness and not feeling well.  Please call PT's son back ASAP to address his concerns     Action Taken: Message routed to:  [facility] ([facility]): Cardiology   Detail Level: Detailed

## 2018-12-20 NOTE — TELEPHONE ENCOUNTER
"Pt has gained 3 lbs over night and while making lunch today she began to feel very tired and \"sick.\" Pt had to lay in her chair to rest. Pt's son thinks she may be back in afib and is concerned for her wt gain. Offered pt a 3 pm appt in the [facility] clinic. He stated he will go to his mothers ad bring her in. Had no further questions. Amna Casey RN CORE Care Coordinator     "

## 2018-12-20 NOTE — NURSING NOTE
Vitals completed successfully and medication reconciled. Ayesha Grayson MA  Chief Complaint   Patient presents with     New Patient     Follow-up AF, off amio. Inpt 11/28 - 12/4 r/t HFpEF exacerbation, presyncope, SOB likely multifactorial given HFpEF, pulmonary fibrosis, AF.

## 2018-12-20 NOTE — TELEPHONE ENCOUNTER
Patient was seen today at the [facility] cardiology [facility].  Dina Swan RN

## 2018-12-20 NOTE — PATIENT INSTRUCTIONS
You were seen today in the Cardiovascular Clinic at the [facility].      Cardiology Providers you saw during your visit:  Dr. Walter MD    Recommendations:    You will need an angiogram procedure.  I will call you after Ana with this appointment information.      We will be scheduling you for a MitraClip procedure.           Nursing questions: [phone] option 1 then chose option 3 for the triage nurse, and then ask to speak with Zehra.  For emergencies call 911.    It was a pleasure to see you in clinic.  If you have any questions at all, please feel free to give me a call.      Zehra Butler RN  Structural Heart Care Coordinator   TAVR and MitraClip Programs  [facility]  Office: [phone]    [facility]  [address] [place] [address]  Cardiology Clinic [id number]  [place] [address]

## 2018-12-20 NOTE — LETTER
12/20/2018      RE: Linda Spicer  [address] [facility] [address]       Dear Colleague,    Thank you for the opportunity to participate in the care of your patient, Linda Spicer, at the [facility]. Please see a copy of my visit note below.    HPI:    Ms. Rutledge is an 87-year-old female who was referred by Dr. Carrasquillo for evaluation of mitral regurgitation and for possible mitral clip placement.  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 she also has a history of pulmonary fibrosis secondary to rheumatoid arthritis, chronic kidney disease hypertension and hypothyroidism.    She has been having worsening shortness of breath over the last several months.  Denies for chest pain, syncope however had few episodes of lightheadedness.    Current Outpatient Medications   Medication     abatacept (ORENCIA) 250 MG injection     acetaminophen (TYLENOL) 500 MG tablet     albuterol (PROVENTIL) (2.5 MG/3ML) 0.083% neb solution     apixaban ANTICOAGULANT (ELIQUIS) 2.5 MG tablet     azaTHIOprine (IMURAN) 50 MG tablet     Blood Pressure Monitor KIT     calcium carbonate-vitamin D (OS-FATOUMATA) 500-400 MG-UNIT tablet     Carboxymethylcellulose Sod PF (CELLUVISC/REFRESH LIQUIGEL) 1 % ophthalmic gel     carvedilol (COREG) 3.125 MG tablet     cetirizine (ZYRTEC ALLERGY) 10 MG tablet     denosumab (PROLIA) 60 MG/ML SOLN injection     folic acid (FOLVITE) 1 MG tablet     furosemide (LASIX) 20 MG tablet     hydrALAZINE (APRESOLINE) 10 MG tablet     hypromellose (GENTEAL) 0.3 % SOLN ophthalmic solution     levothyroxine (SYNTHROID/LEVOTHROID) 75 MCG tablet     Multiple Vitamins-Minerals (PRESERVISION AREDS) CAPS     nitroGLYcerin (NITROSTAT) 0.4 MG sublingual tablet     order for DME     order for DME     order for DME     order for DME     ranibizumab (LUCENTIS)  "0.3 MG/0.05ML SOLN     ranitidine (ZANTAC) 150 MG tablet     saccharomyces boulardii (FLORASTOR) 250 MG capsule     senna-docusate (SENOKOT-S/PERICOLACE) 8.6-50 MG tablet     trimethoprim (TRIMPEX) 100 MG tablet     vitamin C (ASCORBIC ACID) 500 MG tablet     No current facility-administered medications for this visit.      P/E:    /81 (BP Location: Right arm, Patient Position: Chair, Cuff Size: Adult Regular)   Pulse 70   Ht 1.613 m (5' 3.5\")   Wt 73.5 kg (162 lb)   LMP  (LMP Unknown)   SpO2 97%   BMI 28.25 kg/m       Exam:  Constitutional: healthy, alert and no distress  Head: Normocephalic. No masses, lesions, tenderness or abnormalities  Neck: Neck supple. No adenopathy. Thyroid symmetric, normal size,, Carotids without bruits.  ENT: ENT exam normal, no neck nodes or sinus tenderness  Cardiovascular: negative, PMI normal. No lifts, heaves, or thrills. RRR. SM murmur present, clicks gallops or rub  Respiratory: negative, Percussion normal. Good diaphragmatic excursion. Lungs clear  Gastrointestinal: Abdomen soft, non-tender. BS normal. No masses, organomegaly  : Deferred      YOALNDA:    Interpretation Summary  The left atrial appendage is normal. It is free of spontaneous echo contrast  and thrombus.  No transgastric images obtained due to patient discomfort.     The Ejection Fraction is estimated at 55-60%.  Global right ventricular function is normal.  Moderate to severe mitral insufficiency is present.  Moderate tricuspid insufficiency is present.  Multiple MR jets noted. There is blunted systolic forward flow in 2 of the  pulmonary veins. MR volume is estimated at 34ml, however this likely  underestimates MR due to multiple jets.     Compared to prior TTE on 10/9/2018, MR appears to be worse.  _____________________________________________________________________________  __        Procedure  Transesophageal Echocardiogram with color and spectral Doppler performed. 3D  image acquisition, " reconstruction, and real-time interpretation was performed.  Procedure location Echo Lab. Informed consent for Transesophegeal echo  obtained. YOLANDA Probe #51 was used during the procedure. Patient was sedated  using Fentanyl 25 mcg. Patient was sedated using Versed 1 mg. I determined  this patient to be an appropriate candidate for the planned sedation and  procedure and have reassessed the patient immediately prior to sedation and  procedure. Total sedation time: 22 minutes of continuous bedside 1:1  monitoring. The Transducer was inserted without difficulty . The patient  tolerated the procedure well. Complications None. The patient's rhythm is  atrial fibrillation. Good quality two-dimensional was performed and  interpreted.     Left Ventricle  The Ejection Fraction is estimated at 55-60%. No regional wall motion  abnormalities are seen. Left ventricular function, chamber size, wall motion,  and wall thickness are normal.The EF is 55-60%.     Right Ventricle  The right ventricle is normal size. Global right ventricular function is  normal. A pacemaker lead is noted in the right ventricle.     Atria  The left atrial appendage is normal. It is free of spontaneous echo contrast  and thrombus. Moderate left atrial enlargement is present. Mild right atrial  enlargement is present. The atrial septum is intact as assessed by color  Doppler . A pacemaker lead is noted in the right atrium.        Mitral Valve  Moderate to severe mitral insufficiency is present. Multiple MR jets noted.  There is blunted systolic forward flow in 2 of the pulmonary veins. MR volume  is estimated at 34ml, however this likely underestimates MR due to multiple  jets.     Aortic Valve  The aortic valve is tricuspid. Trace to mild aortic insufficiency is present.     Tricuspid Valve  The tricuspid valve is normal. Moderate tricuspid insufficiency is present.  Right ventricular systolic pressure is 31mmHg above the right atrial  pressure.     Pulmonic Valve  The pulmonic valve is normal. Trace pulmonic insufficiency is present.     Vessels  Moderate to severe focal plaque noted in the descending aorta and arch. The  aorta root is normal. Normal pulmonary arteries.        Pericardium  No pericardial effusion is present.      Assessment and plan #1 severe mitral regurgitation:  Reviewed the transthoracic echocardiogram and transesophageal echocardiogram. she would be a candidate for mitral clip placement.  Risk benefits and alternatives of mitral clip placement was discussed with the patient patient has been to proceed at this time.  The meantime I will increase her afterload reduction and continue to manage her fluid status.    Avila Watson M.D.  Interventional Cardiology  [facility]

## 2018-12-21 ENCOUNTER — TELEPHONE (OUTPATIENT)
Dept: INTERNAL MEDICINE | Facility: CLINIC | Age: 83
End: 2018-12-21

## 2018-12-21 NOTE — TELEPHONE ENCOUNTER
Per Dr. Davenport as long as she has no infection it is ok to get the infusion. Called and explained this to patient, patient understood.  Nicole Will [facility] Rhe[facility]ology  12/21/2018 12:05 PM

## 2018-12-21 NOTE — TELEPHONE ENCOUNTER
Reason for Call:  Other     Detailed comments: Needs callback about taking medication infusion and having dental procedure on 1/10/2018. Message needs to forward to Dr Davenport.    Phone Number Patient can be reached at: Home number on file [phone] (home)    Best Time: any    Can we leave a detailed message on this number? YES    Call taken on 12/21/2018 at 10:48 AM by Maribel Crockett

## 2018-12-22 LAB
DLCOUNC-%PRED-PRE: 75 %
DLCOUNC-PRE: 13.74 ML/MIN/MMHG
DLCOUNC-PRED: 18.27 ML/MIN/MMHG
ERV-%PRED-PRE: 99 %
ERV-PRE: 0.36 L
ERV-PRED: 0.36 L
EXPTIME-PRE: 5.77 SEC
FEF2575-%PRED-PRE: 87 %
FEF2575-PRE: 1.17 L/SEC
FEF2575-PRED: 1.34 L/SEC
FEFMAX-%PRED-PRE: 89 %
FEFMAX-PRE: 3.49 L/SEC
FEFMAX-PRED: 3.9 L/SEC
FEV1-%PRED-PRE: 78 %
FEV1-PRE: 1.35 L
FEV1FEV6-PRE: 80 %
FEV1FEV6-PRED: 76 %
FEV1FVC-PRE: 80 %
FEV1FVC-PRED: 72 %
FEV1SVC-PRE: 73 %
FEV1SVC-PRED: 66 %
FIFMAX-PRE: 3.14 L/SEC
FVC-%PRED-PRE: 73 %
FVC-PRE: 1.68 L
FVC-PRED: 2.28 L
IC-%PRED-PRE: 65 %
IC-PRE: 1.48 L
IC-PRED: 2.24 L
ICDO DEVICE COMMENTS: NORMAL
MDC_IDC_EPISODE_DTM: NORMAL
MDC_IDC_EPISODE_DURATION: NORMAL S
MDC_IDC_EPISODE_ID: 587
MDC_IDC_EPISODE_TYPE: NORMAL
MDC_IDC_LEAD_IMPLANT_DT: NORMAL
MDC_IDC_LEAD_IMPLANT_DT: NORMAL
MDC_IDC_LEAD_LOCATION: NORMAL
MDC_IDC_LEAD_LOCATION: NORMAL
MDC_IDC_LEAD_LOCATION_DETAIL_1: NORMAL
MDC_IDC_LEAD_LOCATION_DETAIL_1: NORMAL
MDC_IDC_LEAD_MFG: NORMAL
MDC_IDC_LEAD_MFG: NORMAL
MDC_IDC_LEAD_MODEL: NORMAL
MDC_IDC_LEAD_MODEL: NORMAL
MDC_IDC_LEAD_POLARITY_TYPE: NORMAL
MDC_IDC_LEAD_POLARITY_TYPE: NORMAL
MDC_IDC_LEAD_SERIAL: NORMAL
MDC_IDC_LEAD_SERIAL: NORMAL
MDC_IDC_MSMT_BATTERY_DTM: NORMAL
MDC_IDC_MSMT_BATTERY_REMAINING_LONGEVITY: 85 MO
MDC_IDC_MSMT_BATTERY_RRT_TRIGGER: 2.83
MDC_IDC_MSMT_BATTERY_STATUS: NORMAL
MDC_IDC_MSMT_BATTERY_VOLTAGE: 3 V
MDC_IDC_MSMT_LEADCHNL_RA_IMPEDANCE_VALUE: 380 OHM
MDC_IDC_MSMT_LEADCHNL_RA_IMPEDANCE_VALUE: 494 OHM
MDC_IDC_MSMT_LEADCHNL_RA_PACING_THRESHOLD_AMPLITUDE: 1 V
MDC_IDC_MSMT_LEADCHNL_RA_PACING_THRESHOLD_PULSEWIDTH: 0.4 MS
MDC_IDC_MSMT_LEADCHNL_RA_SENSING_INTR_AMPL: 3.88 MV
MDC_IDC_MSMT_LEADCHNL_RA_SENSING_INTR_AMPL: 4.25 MV
MDC_IDC_MSMT_LEADCHNL_RV_IMPEDANCE_VALUE: 399 OHM
MDC_IDC_MSMT_LEADCHNL_RV_IMPEDANCE_VALUE: 475 OHM
MDC_IDC_MSMT_LEADCHNL_RV_PACING_THRESHOLD_AMPLITUDE: 0.75 V
MDC_IDC_MSMT_LEADCHNL_RV_PACING_THRESHOLD_PULSEWIDTH: 0.4 MS
MDC_IDC_MSMT_LEADCHNL_RV_SENSING_INTR_AMPL: 19.38 MV
MDC_IDC_MSMT_LEADCHNL_RV_SENSING_INTR_AMPL: 27.25 MV
MDC_IDC_PG_IMPLANT_DTM: NORMAL
MDC_IDC_PG_MFG: NORMAL
MDC_IDC_PG_MODEL: NORMAL
MDC_IDC_PG_SERIAL: NORMAL
MDC_IDC_PG_TYPE: NORMAL
MDC_IDC_SESS_CLINIC_NAME: NORMAL
MDC_IDC_SESS_DTM: NORMAL
MDC_IDC_SESS_TYPE: NORMAL
MDC_IDC_SET_BRADY_AT_MODE_SWITCH_RATE: 171 {BEATS}/MIN
MDC_IDC_SET_BRADY_HYSTRATE: NORMAL
MDC_IDC_SET_BRADY_LOWRATE: 70 {BEATS}/MIN
MDC_IDC_SET_BRADY_MAX_SENSOR_RATE: 130 {BEATS}/MIN
MDC_IDC_SET_BRADY_MAX_TRACKING_RATE: 130 {BEATS}/MIN
MDC_IDC_SET_BRADY_MODE: NORMAL
MDC_IDC_SET_BRADY_PAV_DELAY_LOW: 180 MS
MDC_IDC_SET_BRADY_SAV_DELAY_LOW: 150 MS
MDC_IDC_SET_LEADCHNL_RA_PACING_AMPLITUDE: 1.75 V
MDC_IDC_SET_LEADCHNL_RA_PACING_ANODE_ELECTRODE_1: NORMAL
MDC_IDC_SET_LEADCHNL_RA_PACING_ANODE_LOCATION_1: NORMAL
MDC_IDC_SET_LEADCHNL_RA_PACING_CAPTURE_MODE: NORMAL
MDC_IDC_SET_LEADCHNL_RA_PACING_CATHODE_ELECTRODE_1: NORMAL
MDC_IDC_SET_LEADCHNL_RA_PACING_CATHODE_LOCATION_1: NORMAL
MDC_IDC_SET_LEADCHNL_RA_PACING_POLARITY: NORMAL
MDC_IDC_SET_LEADCHNL_RA_PACING_PULSEWIDTH: 0.4 MS
MDC_IDC_SET_LEADCHNL_RA_SENSING_ANODE_ELECTRODE_1: NORMAL
MDC_IDC_SET_LEADCHNL_RA_SENSING_ANODE_LOCATION_1: NORMAL
MDC_IDC_SET_LEADCHNL_RA_SENSING_CATHODE_ELECTRODE_1: NORMAL
MDC_IDC_SET_LEADCHNL_RA_SENSING_CATHODE_LOCATION_1: NORMAL
MDC_IDC_SET_LEADCHNL_RA_SENSING_POLARITY: NORMAL
MDC_IDC_SET_LEADCHNL_RA_SENSING_SENSITIVITY: 0.3 MV
MDC_IDC_SET_LEADCHNL_RV_PACING_AMPLITUDE: 1.5 V
MDC_IDC_SET_LEADCHNL_RV_PACING_ANODE_ELECTRODE_1: NORMAL
MDC_IDC_SET_LEADCHNL_RV_PACING_ANODE_LOCATION_1: NORMAL
MDC_IDC_SET_LEADCHNL_RV_PACING_CAPTURE_MODE: NORMAL
MDC_IDC_SET_LEADCHNL_RV_PACING_CATHODE_ELECTRODE_1: NORMAL
MDC_IDC_SET_LEADCHNL_RV_PACING_CATHODE_LOCATION_1: NORMAL
MDC_IDC_SET_LEADCHNL_RV_PACING_POLARITY: NORMAL
MDC_IDC_SET_LEADCHNL_RV_PACING_PULSEWIDTH: 0.4 MS
MDC_IDC_SET_LEADCHNL_RV_SENSING_ANODE_ELECTRODE_1: NORMAL
MDC_IDC_SET_LEADCHNL_RV_SENSING_ANODE_LOCATION_1: NORMAL
MDC_IDC_SET_LEADCHNL_RV_SENSING_CATHODE_ELECTRODE_1: NORMAL
MDC_IDC_SET_LEADCHNL_RV_SENSING_CATHODE_LOCATION_1: NORMAL
MDC_IDC_SET_LEADCHNL_RV_SENSING_POLARITY: NORMAL
MDC_IDC_SET_LEADCHNL_RV_SENSING_SENSITIVITY: 0.9 MV
MDC_IDC_SET_ZONE_DETECTION_INTERVAL: 350 MS
MDC_IDC_SET_ZONE_DETECTION_INTERVAL: 400 MS
MDC_IDC_SET_ZONE_TYPE: NORMAL
MDC_IDC_STAT_AT_BURDEN_PERCENT: 65 %
MDC_IDC_STAT_AT_DTM_END: NORMAL
MDC_IDC_STAT_AT_DTM_START: NORMAL
MDC_IDC_STAT_BRADY_AP_VP_PERCENT: 0.31 %
MDC_IDC_STAT_BRADY_AP_VS_PERCENT: 33.52 %
MDC_IDC_STAT_BRADY_AS_VP_PERCENT: 62.38 %
MDC_IDC_STAT_BRADY_AS_VS_PERCENT: 3.79 %
MDC_IDC_STAT_BRADY_DTM_END: NORMAL
MDC_IDC_STAT_BRADY_DTM_START: NORMAL
MDC_IDC_STAT_BRADY_RA_PERCENT_PACED: 27.52 %
MDC_IDC_STAT_BRADY_RV_PERCENT_PACED: 61.89 %
MDC_IDC_STAT_EPISODE_RECENT_COUNT: 0
MDC_IDC_STAT_EPISODE_RECENT_COUNT: 1
MDC_IDC_STAT_EPISODE_RECENT_COUNT_DTM_END: NORMAL
MDC_IDC_STAT_EPISODE_RECENT_COUNT_DTM_START: NORMAL
MDC_IDC_STAT_EPISODE_TOTAL_COUNT: 0
MDC_IDC_STAT_EPISODE_TOTAL_COUNT: 0
MDC_IDC_STAT_EPISODE_TOTAL_COUNT: 3
MDC_IDC_STAT_EPISODE_TOTAL_COUNT: 584
MDC_IDC_STAT_EPISODE_TOTAL_COUNT_DTM_END: NORMAL
MDC_IDC_STAT_EPISODE_TOTAL_COUNT_DTM_START: NORMAL
MDC_IDC_STAT_EPISODE_TYPE: NORMAL
VA-%PRED-PRE: 71 %
VA-PRE: 3.55 L
VC-%PRED-PRE: 70 %
VC-PRE: 1.84 L
VC-PRED: 2.6 L

## 2018-12-26 ENCOUNTER — TRANSFERRED RECORDS (OUTPATIENT)
Dept: HEALTH INFORMATION MANAGEMENT | Facility: CLINIC | Age: 83
End: 2018-12-26

## 2018-12-26 ENCOUNTER — PATIENT OUTREACH (OUTPATIENT)
Dept: CARE COORDINATION | Facility: CLINIC | Age: 83
End: 2018-12-26

## 2018-12-26 DIAGNOSIS — I48.0 PAROXYSMAL ATRIAL FIBRILLATION (H): ICD-10-CM

## 2018-12-26 DIAGNOSIS — I50.30 HEART FAILURE WITH PRESERVED EJECTION FRACTION (H): ICD-10-CM

## 2018-12-26 DIAGNOSIS — I50.30 HEART FAILURE WITH PRESERVED EJECTION FRACTION (H): Primary | ICD-10-CM

## 2018-12-26 LAB
ANION GAP SERPL CALCULATED.3IONS-SCNC: 6 MMOL/L (ref 3–14)
BUN SERPL-MCNC: 29 MG/DL (ref 7–30)
CALCIUM SERPL-MCNC: 8.7 MG/DL (ref 8.5–10.1)
CHLORIDE SERPL-SCNC: 104 MMOL/L (ref 94–109)
CO2 SERPL-SCNC: 28 MMOL/L (ref 20–32)
CREAT SERPL-MCNC: 1.43 MG/DL (ref 0.52–1.04)
GFR SERPL CREATININE-BSD FRML MDRD: 33 ML/MIN/{1.73_M2}
GLUCOSE SERPL-MCNC: 94 MG/DL (ref 70–99)
POTASSIUM SERPL-SCNC: 4 MMOL/L (ref 3.4–5.3)
SODIUM SERPL-SCNC: 138 MMOL/L (ref 133–144)

## 2018-12-26 PROCEDURE — 80048 BASIC METABOLIC PNL TOTAL CA: CPT | Performed by: INTERNAL MEDICINE

## 2018-12-26 PROCEDURE — 36415 COLL VENOUS BLD VENIPUNCTURE: CPT | Performed by: INTERNAL MEDICINE

## 2018-12-26 ASSESSMENT — ACTIVITIES OF DAILY LIVING (ADL): DEPENDENT_IADLS:: COOKING;CLEANING;LAUNDRY;SHOPPING;MEDICATION MANAGEMENT;MONEY MANAGEMENT;TRANSPORTATION

## 2018-12-26 NOTE — PROGRESS NOTES
Clinic Care Coordination Contact    Clinic Care Coordination Contact  OUTREACH    Referral Information:  Referral Source: IP Report    Primary Diagnosis: CHF    Chief Complaint   Patient presents with     Clinic Care Coordination - Follow-up     RN        Universal Utilization: Patient is followed by cardiology, rheumatology, endocrinology, urology and pulmonology  Clinic Utilization  Difficulty keeping appointments:: No  Compliance Concerns: No  No-Show Concerns: No  No PCP office visit in Past Year: No  Utilization    Last refreshed: 12/26/2018 11:31 AM:  Hospital Admissions 3           Last refreshed: 12/26/2018 11:31 AM:  ED Visits 0           Last refreshed: 12/26/2018 11:31 AM:  No Show Count (past year) 2              Current as of: 12/26/2018 11:31 AM              Clinical Concerns:  Current Medical Concerns:  Patient reports she has been doing well.  She has been following up with cardiology as directed and states she will be having an angiogram sometime in 2019, likely February.  Patient states her children having been coming over often to help with housework and bathing.  Patient is now receiving delivered meals from [employer] and her children have also been bringing her fresh meals.  No other concerns at this time.  Patient Active Problem List   Diagnosis     Hypertension goal BP (blood pressure) < 150/90     Hypothyroidism     Macular degeneration, left eye     Irritable bowel syndrome     Encounter for palliative care     Adjustment disorder with anxious mood     Mild anemia     DDD (degenerative disc disease), lumbar     CKD (chronic kidney disease) stage 3, GFR 30-59 ml/min (H)     History of blood transfusion     Aftercare following surgery     S/P lumbar laminectomy     High risk medication use     Age-related osteoporosis without current pathological fracture     Atrophic vaginitis     Fecal incontinence     Female stress incontinence     Impingement syndrome of both shoulders     UIP (usual  interstitial pneumonitis) (H)     High risk medications (not anticoagulants) long-term use     Heart failure with preserved ejection fraction (H)     Congestive heart failure with preserved LV function, NYHA class 3 (H)     Other specified hypothyroidism     Rheumatoid arthritis involving multiple sites with positive rheumatoid factor (H)     Health Care Home     Sick sinus syndrome (H)     Cardiac pacemaker - Medtronic dual lead pacemaker - Not Dependent - MRI Safe     Immunosuppression (H)     ILD (interstitial lung disease) (H)     Heart failure with preserved ejection fraction, NYHA class I (H)     Atrial flutter (H)     Paroxysmal atrial fibrillation (H)     CHF exacerbation (H)     Pre-syncope     (HFpEF) heart failure with preserved ejection fraction (H)     Mitral regurgitation        Current Behavioral Concerns: n/a      Education Provided to patient: none at this time     Pain  Pain (GOAL):: No  Health Maintenance Reviewed: Due/Overdue   Health Maintenance Due   Topic Date Due     ZOSTER IMMUNIZATION (1 of 2) 06/13/1981     MICROALBUMIN Q1 YEAR  07/26/2018      Clinical Pathway: None    Medication Management:  Patient's children are assisting patient with medication management, setting up pill boxes and reminders to take medications.     Functional Status:  Dependent ADLs:: Bathing, Ambulation-walker  Dependent IADLs:: Cooking, Cleaning, Laundry, Shopping, Medication Management, Money Management, Transportation  Bed or wheelchair confined:: No  Mobility Status: Independent w/Device    Living Situation:  Current living arrangement:: I live alone  Type of residence:: Independent Senior Living    Diet/Exercise/Sleep:  Diet:: Low cholesterol, Low saturated fat, No added salt  Inadequate nutrition (GOAL):: No  Food Insecurity: No  Tube Feeding: No  Exercise:: Currently not exercising  Inadequate activity/exercise (GOAL):: No  Significant changes in sleep pattern (GOAL): No    Transportation:  Transportation  concerns (GOAL):: No  Transportation means:: Family, [place] mobility     Psychosocial:  Sabianism or spiritual beliefs that impact treatment:: No  Mental health DX:: No  Mental health management concern (GOAL):: No  Informal Support system:: Stacey based, Family, Friends, Neighbors, Spouse     Financial/Insurance:   Financial/Insurance concerns (GOAL):: No       Resources and Interventions:  Current Resources:    ;   Community Resources: None  Supplies used at home:: None  Equipment Currently Used at Home: raised toilet, shower chair, walker, rolling, wheelchair, power    Advance Care Plan/Directive  Advanced Care Plans/Directives on file:: In process  Advanced Care Plan/Directive Status: In Process    Referrals Placed: None     Goals: none at this time        Patient/Caregiver understanding: Patient has good understanding of her current plan of care and states she will contact writer if she has questions or concerns prior to our next contact.  Patient state she does not need a call from writer for a month.    Outreach Frequency: 2 weeks  Future Appointments              Tomorrow [address]    In 1 week [facility] [place]    In 1 week Priscilla Agrawal PA-C [facility]    In 2 weeks Mario Davenport MD [facility]    In 4 weeks [address]    In 1 month Asia Steven PA-C [facility]    In 2 months Asia Steven PA-C [facility]    In 2 months Minna Gallagher APRN CNP [facility]    In 2 months [facility]          Plan:   1. Patient will continue to follow treatment plan as directed and follow up with PCP and specialists with concerns  ongoing.   2. RN CC will follow up with patient in 1 month as requested and remain available to patient and care team as needed.    Melissa Behl BSN, RN, N  [facility] Care Coordinator  [phone]

## 2018-12-27 ENCOUNTER — INFUSION THERAPY VISIT (OUTPATIENT)
Dept: INFUSION THERAPY | Facility: CLINIC | Age: 83
End: 2018-12-27
Payer: MEDICARE

## 2018-12-27 VITALS
WEIGHT: 162.9 LBS | HEART RATE: 69 BPM | SYSTOLIC BLOOD PRESSURE: 133 MMHG | BODY MASS INDEX: 28.4 KG/M2 | TEMPERATURE: 97.9 F | RESPIRATION RATE: 16 BRPM | OXYGEN SATURATION: 96 % | DIASTOLIC BLOOD PRESSURE: 77 MMHG

## 2018-12-27 DIAGNOSIS — M05.79 RHEUMATOID ARTHRITIS INVOLVING MULTIPLE SITES WITH POSITIVE RHEUMATOID FACTOR (H): Primary | ICD-10-CM

## 2018-12-27 PROCEDURE — 99207 ZZC NO CHARGE LOS: CPT

## 2018-12-27 PROCEDURE — 96365 THER/PROPH/DIAG IV INF INIT: CPT | Performed by: NURSE PRACTITIONER

## 2018-12-27 RX ADMIN — Medication 250 ML: at 09:30

## 2018-12-27 ASSESSMENT — PAIN SCALES - GENERAL: PAINLEVEL: NO PAIN (0)

## 2018-12-27 NOTE — PROGRESS NOTES
SPIRITUAL HEALTH SERVICES Progress Note  [facility]    Visited Linda Spicer and her daughter for ongoing emotional/spiritual support.   I introduced  services and offered the patient and her daughter prayer and Holy Communion today.       Illness Narrative - Patient states that she has been diagnosed with arthritis for many years. She is here for an infusion treatment today and her daughter who is visiting from [place] is here with her.       Distress - Patient shared with me the lost of her  F[place] of 60+ years this past September.  Patient's oldest daughter also has been experiencing health issues and this has been difficult for her to watch.       Coping - Patient has six adult children, many grandchildren and great grandchildren who support her.  Patient has  lived at [facility] for over four years.  She states that she is Caodaism and they have mass regularly during the week.  She had been a member of [facility] [employer] for many years.  She appreciates receiving  the sacraments.       Meaning-Making - Her debbie and her family give her life meaning.       Plan - I let her know of   Availability. She knows that she can request a  visit if that would be helpful.     Adrianne Gomez  Associate   Pager

## 2018-12-27 NOTE — PROGRESS NOTES
Infusion Nursing Note:  Linda KATEY Spicer presents today for Orencia infusion.    Patient seen by provider today: No   present during visit today: Not Applicable.    Note: 11/28/18 hospitalized for afib and leaky mitral valve. Has followed up with cardiology since discharge.  Is planning to have surgery in February to repair leaky valve.  Educated patient on needing to possibly hold Orenica 4-6 weeks prior to surgery, due to impaired healing.  Patient states surgery will not be until February, thus wants infusion today.  Patient states she will inform her cardiologist, and Dr Davenport, about heart surgery and Orencia use, prior to any future infusions.    Intravenous Access:  Peripheral IV placed.    Treatment Conditions:  Biological Infusion Checklist:    ~~~ NOTE: If the patient answers yes to any of the questions below, hold the infusion and contact ordering provider or on-call provider.    1. Have you recently had an elevated temperature, fever, chills, productive cough, coughing for 3 weeks or longer or hemoptysis,  abnormal vital signs, night sweats,  chest pain or have you noticed a decrease in your appetite, unexplained weight loss or fatigue? No  2. Do you have any open wounds or new incisions? No  3. Do you have any recent or upcoming hospitalizations, surgeries or dental procedures? Yes, Planning surgery February 2019 to repair mitral valve  4. Do you currently have or recently have had any signs of illness or infection or are you on any antibiotics? No On prophylactic antibiotic for UTIs.  5. Have you had any new, sudden or worsening abdominal pain? No  6. Have you or anyone in your household received a live vaccination in the past 4 weeks? Please note:  No live vaccines while on biologic/chemotherapy until 6 months after the last treatment.  Patient can receive the flu vaccine (shot only) and the pneumovax.  It is optimal for the patient to get these vaccines mid cycle, but they can be given at  any time as long as it is not on the day of the infusion. No  7. Have you recently been diagnosed with any new nervous system diseases (ie. Multiple sclerosis, Guillain [place], seizures, neurological changes) or cancer diagnosis? Are you on any form of radiation or chemotherapy? No  8. Have there been any other new onset medical symptoms? No    Post Infusion Assessment:  Patient tolerated infusion without incident.  Blood return noted pre and post infusion.  Site patent and intact, free from redness, edema or discomfort.  No evidence of extravasations.  Access discontinued per protocol.  Biologic Infusion Post Education: Call the triage nurse at your clinic or seek medical attention if you have chills and/or temperature greater than or equal to 100.5, uncontrolled nausea/vomiting, diarrhea, constipation, dizziness, shortness of breath, chest pain, heart palpitations, weakness or any other new or concerning symptoms, questions or concerns.  You cannot have any live virus vaccines prior to or during treatment or up to 6 months post infusion.  If you have an upcoming surgery, medical procedure or dental procedure during treatment, this should be discussed with your ordering physician and your surgeon/dentist.  If you are having any concerning symptom, if you are unsure if you should get your next infusion or wish to speak to a provider before your next infusion, please call your care coordinator or triage nurse at your clinic to notify them so we can adequately serve you.    Discharge Plan:   Patient will check with cardiologist, and rheumatologist, regarding timing of next infusion and surgery.  Patient discharged in stable condition accompanied by: daughter from [place].  Departure Mode: Wheelchair.    Sweta Floyd RN

## 2018-12-28 ENCOUNTER — TELEPHONE (OUTPATIENT)
Dept: CARDIOLOGY | Facility: CLINIC | Age: 83
End: 2018-12-28

## 2018-12-28 NOTE — TELEPHONE ENCOUNTER
Patient called to report that she is been in AFIB since 8:30 AM this morning. Patient reported feeling short of breath,  has an upset stomach and feeling very tired.Blood pressure readings for the  last few days are: 162/117,177/93,148/85,167/93 and 129/106 during telephone call. Heart rates vary between 70-73 bpm. Patient denied any chest pain.    Writer advised patient to go to emergency department for a full work-up.patient is a little resistive  to go to ED right now due to long period of time waiting that she has  experienced in the past.    Writer explain to patient due to history of presyncopal episode,and recent hospitalization it will be in her best interest to be seen right now.    Patient expressed understanding and in agreement with plan, and stated that her son is coming to her house in a moment he will take her to ED. Writer instructed patient to call 911 if symptoms become severe.    Message sent to Nurse practitioner for update.    Marion Gutierrez RN

## 2018-12-31 NOTE — PROGRESS NOTES
[facility] CARDIOTHORACIC SURGERY CONSULT     Linda Spicer MRN# [id number]   YOB: 1931 Age: 87 year old      Date of Admission:  (Not on file)    Primary care provider: Tre Lockett         Chief Complaint:   Severe mitral regurgitation, Atrial fibrillation     History of Present Illness: Linda Spicer is a 87 year old female with multiple recent hospital admissions for atrial fibrillation and heart failure.  Recent echocardiogram showed severe mitral regurgitation.  She was referred to clinic for evaluation for surgery versus mitral clip.    She is seen today in clinic with her daughter present.  She is a pleasant elderly female.  She is not interested in surgery for her mitral regurgitation.  She is interested in investigating possible mitral clip.    I have asked Dr. Avila Watson to see the patient today as well.  Please refer to his note for further details of his evaluation.           Assessment and Plan:   Linda Spicer is a 87 year old female with severe mitral regurgitation and atrial fibrillation  1) Not a candidate for surgical intervention given age, frailty, comorbidities  2) Would consider mitral clip - please refer to Dr. Watson's note   3) Please call with questions or concerns.     Thank you for the opportunity to participate in the care of this patient.           Ruiz Quigley MD  Cardiothoracic Surgery  [phone]                Past Medical History:     Past Medical History:   Diagnosis Date     Adjustment disorder with anxious mood 5/18/2015     Advanced directives, counseling/discussion 8/30/2012    Patient states has Advance Directive and will bring in a copy to clinic. 8/30/2012   Tevin May  [facility] Medical Assistant \       Anemia 9/25/2015     Basal cell cancer      CHF (congestive heart failure) (H) 9/18/2014     CKD (chronic kidney disease) stage 3, GFR 30-59 ml/min (H) 9/29/2015     DDD (degenerative disc disease), lumbar 9/25/2015     Diffuse  idiopathic pulmonary fibrosis (H) 5/6/2013     Encounter for palliative care 5/18/2015     History of blood transfusion 9/29/2015     Hypertension goal BP (blood pressure) < 140/90 9/7/2012     Hypothyroid 9/7/2012     Irritable bowel syndrome 10/29/2013     Macular degeneration      Macular degeneration, 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     Nondisplaced spiral fracture of shaft of humerus      Osteoporosis 8/13/2013     Imo Update utility     RA (rheumatoid arthritis) (H) 5/7/2013     Rheumatic fever      Shingles      Spinal stenosis of lumbar region with neurogenic claudication 9/14/2015               Past Surgical History:     Past Surgical History:   Procedure Laterality Date     ANESTHESIA CARDIOVERSION N/A 9/14/2018    Procedure: ANESTHESIA CARDIOVERSION;;  Surgeon: GENERIC ANESTHESIA PROVIDER;  Location: [facility] OR     ANESTHESIA CARDIOVERSION N/A 10/11/2018    Procedure: ANESTHESIA CARDIOVERSION;  Cardioversion;  Surgeon: GENERIC ANESTHESIA PROVIDER;  Location: [facility] OR     ANESTHESIA CARDIOVERSION N/A 10/16/2018    Procedure: Anesthesia Cardioversion ;  Surgeon: GENERIC ANESTHESIA PROVIDER;  Location: [facility] OR     APPENDECTOMY       BIOPSY      hemorrhoidectomy     ENT SURGERY      tonsillectomy     GYN SURGERY      3 D & C's     HYSTERECTOMY, PAP NO LONGER INDICATED       LAMINECTOMY LUMBAR ONE LEVEL N/A 10/13/2015    Procedure: LAMINECTOMY LUMBAR ONE LEVEL;  Surgeon: Fransico Toussaint MD;  Location: [facility] OR               Social History:     Social History     Socioeconomic History     Marital status:      Spouse name: Not on file     Number of children: Not on file     Years of education: Not on file     Highest education level: Not on file   Social Needs     Financial resource strain: Not on file     Food insecurity - worry: Not on file     Food insecurity - inability: Not on file     Transportation needs - medical: Not on file     Transportation needs - non-medical: Not on file   Occupational History     Not on file    Tobacco Use     Smoking status: Never Smoker     Smokeless tobacco: Never Used   Substance and Sexual Activity     Alcohol use: Yes     Comment: rare wine      Drug use: No     Sexual activity: No   Other Topics Concern     Parent/sibling w/ CABG, MI or angioplasty before 65F 55M? Not Asked   Social History Narrative    . Has six children. She enjoys [employer] and [employer].  passed away.  Her son has financial and alcohol issues.               Family History:     Family History   Problem Relation Age of Onset     Hypertension Mother      Psychotic Disorder Father      Diabetes Son      Diabetes Daughter      Blood Disease Daughter               Allergies:      Allergies   Allergen Reactions     Cephalexin Hcl Diarrhea     Gabapentin Other (See Comments)     Dizzsiness     Naproxen GI Disturbance     Perfume      Macrobid [Nitrofurantoin Anhydrous]      Possibly related to lung disease      Seasonal Allergies      Sulfa Drugs      Throat swelling     Xanax [Alprazolam] Other (See Comments)     Dizziness      Ciprofloxacin Itching and Rash               Medications:     Current Outpatient Medications   Medication     abatacept (ORENCIA) 250 MG injection     acetaminophen (TYLENOL) 500 MG tablet     albuterol (PROVENTIL) (2.5 MG/3ML) 0.083% neb solution     apixaban ANTICOAGULANT (ELIQUIS) 2.5 MG tablet     azaTHIOprine (IMURAN) 50 MG tablet     Blood Pressure Monitor KIT     calcium carbonate-vitamin D (OS-FATOUMATA) 500-400 MG-UNIT tablet     Carboxymethylcellulose Sod PF (CELLUVISC/REFRESH LIQUIGEL) 1 % ophthalmic gel     carvedilol (COREG) 3.125 MG tablet     cetirizine (ZYRTEC ALLERGY) 10 MG tablet     denosumab (PROLIA) 60 MG/ML SOLN injection     folic acid (FOLVITE) 1 MG tablet     furosemide (LASIX) 20 MG tablet     hypromellose (GENTEAL) 0.3 % SOLN ophthalmic solution     levothyroxine (SYNTHROID/LEVOTHROID) 75 MCG tablet     Multiple Vitamins-Minerals (PRESERVISION AREDS) CAPS     nitroGLYcerin  (NITROSTAT) 0.4 MG sublingual tablet     order for DME     order for DME     order for DME     order for DME     ranibizumab (LUCENTIS) 0.3 MG/0.05ML SOLN     ranitidine (ZANTAC) 150 MG tablet     saccharomyces boulardii (FLORASTOR) 250 MG capsule     senna-docusate (SENOKOT-S/PERICOLACE) 8.6-50 MG tablet     trimethoprim (TRIMPEX) 100 MG tablet     vitamin C (ASCORBIC ACID) 500 MG tablet     No current facility-administered medications for this visit.              Review of Systems:   A 10 point ROS was performed and is negative other than HPI.          Physical Exam:           Gen: NAD, resting comfortably in bed  Lungs: CTAB, non-labored breathing  CV: regular rhythm, normal rate  Abd: soft, not tender, not distended  Ext: motor, sensation, pulses intact  Neuro: AOx3    Labs:  Lab Results   Component Value Date    WBC 5.9 12/03/2018     Lab Results   Component Value Date    RBC 3.22 12/03/2018     Lab Results   Component Value Date    HGB 10.7 12/03/2018     Lab Results   Component Value Date    HCT 32.6 12/03/2018     No components found for: MCT  Lab Results   Component Value Date     12/03/2018     Lab Results   Component Value Date    MCH 33.2 12/03/2018     Lab Results   Component Value Date    MCHC 32.8 12/03/2018     Lab Results   Component Value Date    RDW 13.0 12/03/2018     Lab Results   Component Value Date     12/03/2018     Last Comprehensive Metabolic Panel:  Sodium   Date Value Ref Range Status   12/26/2018 138 133 - 144 mmol/L Final     Potassium   Date Value Ref Range Status   12/26/2018 4.0 3.4 - 5.3 mmol/L Final     Chloride   Date Value Ref Range Status   12/26/2018 104 94 - 109 mmol/L Final     Carbon Dioxide   Date Value Ref Range Status   12/26/2018 28 20 - 32 mmol/L Final     Anion Gap   Date Value Ref Range Status   12/26/2018 6 3 - 14 mmol/L Final     Glucose   Date Value Ref Range Status   12/26/2018 94 70 - 99 mg/dL Final     Comment:     Non Fasting     Urea Nitrogen    Date Value Ref Range Status   12/26/2018 29 7 - 30 mg/dL Final     Creatinine   Date Value Ref Range Status   12/26/2018 1.43 (H) 0.52 - 1.04 mg/dL Final     GFR Estimate   Date Value Ref Range Status   12/26/2018 33 (L) >60 mL/min/[1.73_m2] Final     Comment:     Non  GFR Calc  Starting 12/18/2018, serum creatinine based estimated GFR (eGFR) will be   calculated using the Chronic Kidney Disease Epidemiology Collaboration   (CKD-EPI) equation.       Calcium   Date Value Ref Range Status   12/26/2018 8.7 8.5 - 10.1 mg/dL Final       Imaging:  Transesophageal echocardiogram (10/16/18):  Interpretation Summary  The left atrial appendage is normal. It is free of spontaneous echo contrast  and thrombus.  No transgastric images obtained due to patient discomfort.     The Ejection Fraction is estimated at 55-60%.  Global right ventricular function is normal.  Moderate to severe mitral insufficiency is present.  Moderate tricuspid insufficiency is present.  Multiple MR jets noted. There is blunted systolic forward flow in 2 of the  pulmonary veins. MR volume is estimated at 34ml, however this likely  underestimates MR due to multiple jets.     Compared to prior TTE on 10/9/2018, MR appears to be worse.  _____________________________________________________________________________  __        Procedure  Transesophageal Echocardiogram with color and spectral Doppler performed. 3D  image acquisition, reconstruction, and real-time interpretation was performed.  Procedure location Echo Lab. Informed consent for Transesophegeal echo  obtained. YOLANDA Probe #[id number] was used during the procedure. Patient was sedated  using Fentanyl 25 mcg. Patient was sedated using Versed 1 mg. I determined  this patient to be an appropriate candidate for the planned sedation and  procedure and have reassessed the patient immediately prior to sedation and  procedure. Total sedation time: 22 minutes of continuous bedside 1:1  monitoring.  The Transducer was inserted without difficulty . The patient  tolerated the procedure well. Complications None. The patient's rhythm is  atrial fibrillation. Good quality two-dimensional was performed and  interpreted.     Left Ventricle  The Ejection Fraction is estimated at 55-60%. No regional wall motion  abnormalities are seen. Left ventricular function, chamber size, wall motion,  and wall thickness are normal.The EF is 55-60%.     Right Ventricle  The right ventricle is normal size. Global right ventricular function is  normal. A pacemaker lead is noted in the right ventricle.     Atria  The left atrial appendage is normal. It is free of spontaneous echo contrast  and thrombus. Moderate left atrial enlargement is present. Mild right atrial  enlargement is present. The atrial septum is intact as assessed by color  Doppler . A pacemaker lead is noted in the right atrium.        Mitral Valve  Moderate to severe mitral insufficiency is present. Multiple MR jets noted.  There is blunted systolic forward flow in 2 of the pulmonary veins. MR volume  is estimated at 34ml, however this likely underestimates MR due to multiple  jets.     Aortic Valve  The aortic valve is tricuspid. Trace to mild aortic insufficiency is present.     Tricuspid Valve  The tricuspid valve is normal. Moderate tricuspid insufficiency is present.  Right ventricular systolic pressure is 31mmHg above the right atrial pressure.     Pulmonic Valve  The pulmonic valve is normal. Trace pulmonic insufficiency is present.     Vessels  Moderate to severe focal plaque noted in the descending aorta and arch. The  aorta root is normal. Normal pulmonary arteries.        Pericardium  No pericardial effusion is present.     _____________________________________________________________________________  __  Doppler Measurements & Calculations  TR max adrian: 278.0 cm/sec  TR max P.9 mmHg

## 2019-01-02 NOTE — PROGRESS NOTES
CARDIOLOGY CLINIC  Linda Spicer is a 87 year old female with rheumatoid arthritis, sick sinus syndrome s/p pacemaker in 2017, atrial fibrillation on anticoagulation s/p multiple cardioversions, mitral regurgitation and chronic diastolic heart failure who presents for follow up.     I last saw her in early November, since that time she unfortunately had another hospitalization at [facility] from 11/28 to 12/4/2018. She presented with complaint of generalized weakness. Interrogation showed she'd been in atrial fib/flutter since 11/20. She did have a syncopal event after PFTs when standing to get back into bed. She continued to have dyspnea despite not being volume overloaded, repeat Echo showed only mild to moderate MR. Neurology was consulted and labs for myasthenia were negative. Pulmonary was consulted, Chest CT and PFTs were done. 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 Her CRP and ESR were within normal. CT showed only mild progression of fibrosis. A new 5mm nodule noted that will need follow up. It was felt her atrial arrhythmias may be contributing but rhythm control has been difficult, EP does not feel she is candidate for ablation. Her LRL was increased from 60 to 70bpm. She then went to rehab. Weight at 154lbs, Lasix dose at 40mg in AM and 20mg in afternoon. Seen in clinic 12/17 and was feeling bad. Creatinine bumped to 2.1 and she was felt to by hypovolemic. Instructed to hold Lasix for 2 days and resume at 20mg daily thereafter. Saw Dr. Carrasquillo on 12/19 at the [facility], device interrogation noted >100 hours of AT/AF starting on 12/8 which patient notes correlated to her symptoms of feeling unwell. They felt she was hypervolemic so increased her Lasix to 20mg BID. She also had a consultation with the valve clinic regarding her mitral valve, which on YOLANDA back in October was read as severe but on TTE the end of November during hospitalization it was mild to  moderate. They are waiting to hear from the valve nurse as to whether MitraClip appropriate or not.    Today she reports feeling well, her 3rd day in a row. She notes that last week she had a rough day. She did call in and speak to one of our nurses who recommended going to the ER but given her previous waits she opted to wait at home with family and then started feeling a bit better. Initially she had high blood pressure but then they noted a blood pressure later of 126/106 which they thought was odd. There were a few days she was having dizziness in the morning when she gets up but that seems better. She denies chest pain.     She's frustrated by her lack of ability to exert herself. She can only walk from car to building without dyspnea. Can't tolerate shopping. She wants to be more active. Her son and daughter are present with her today. She feels her weight is up a little bit. Legs aren't really swollen.     PAST MEDICAL HISTORY:  Past Medical History:   Diagnosis Date     Adjustment disorder with anxious mood 5/18/2015     Advanced directives, counseling/discussion 8/30/2012    Patient states has Advance Directive and will bring in a copy to clinic. 8/30/2012   Tevin May  [facility] Medical Assistant \       Anemia 9/25/2015     Basal cell cancer      CHF (congestive heart failure) (H) 9/18/2014     CKD (chronic kidney disease) stage 3, GFR 30-59 ml/min (H) 9/29/2015     DDD (degenerative disc disease), lumbar 9/25/2015     Diffuse idiopathic pulmonary fibrosis (H) 5/6/2013     Encounter for palliative care 5/18/2015     History of blood transfusion 9/29/2015     Hypertension goal BP (blood pressure) < 140/90 9/7/2012     Hypothyroid 9/7/2012     Irritable bowel syndrome 10/29/2013     Macular degeneration      Macular degeneration, 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     Nondisplaced spiral fracture of shaft of humerus      Osteoporosis 8/13/2013     Imo Update utility     RA (rheumatoid arthritis) (H) 5/7/2013     Rheumatic  fever      Shingles      Spinal stenosis of lumbar region with neurogenic claudication 9/14/2015     SOCIAL HISTORY: ,  passed away this past fall. Never a smoker. Loves playing cards- bridge especially    CURRENT MEDICATIONS:  Current Outpatient Medications   Medication Sig Dispense Refill     abatacept (ORENCIA) 250 MG injection Inject 750 mg into the vein every 28 days 3 each 0     acetaminophen (TYLENOL) 500 MG tablet Take 1 tablet (500 mg) by mouth every 6 hours as needed for mild pain or fever       albuterol (PROVENTIL) (2.5 MG/3ML) 0.083% neb solution Take 1 vial (2.5 mg) by nebulization every 6 hours as needed for shortness of breath / dyspnea or wheezing 360 mL      apixaban ANTICOAGULANT (ELIQUIS) 2.5 MG tablet Take 1 tablet (2.5 mg) by mouth 2 times daily 180 tablet 3     azaTHIOprine (IMURAN) 50 MG tablet Take 1 tablet (50 mg) by mouth daily 90 tablet 2     Blood Pressure Monitor KIT 1 kit daily as needed 1 kit 0     calcium carbonate-vitamin D (OS-FATOUMATA) 500-400 MG-UNIT tablet Take 1 tablet by mouth daily 60 tablet      Carboxymethylcellulose Sod PF (CELLUVISC/REFRESH LIQUIGEL) 1 % ophthalmic gel Place 1 drop into both eyes daily as needed for dry eyes 1 Bottle      carvedilol (COREG) 3.125 MG tablet Take 1 tablet (3.125 mg) by mouth 2 times daily (with meals) 60 tablet 11     cetirizine (ZYRTEC ALLERGY) 10 MG tablet Take 1 tablet (10 mg) by mouth At Bedtime 30 tablet      denosumab (PROLIA) 60 MG/ML SOLN injection Inject 1 mL (60 mg) Subcutaneous every 6 months 1 mL      folic acid (FOLVITE) 1 MG tablet Take 1 tablet (1 mg) by mouth daily 90 tablet 3     furosemide (LASIX) 20 MG tablet Take 1 tablet (20 mg) by mouth 2 times daily Hold Lasix 12/18 and 12/19, then start 20 mg daily after that. 60 tablet 0     hypromellose (GENTEAL) 0.3 % SOLN ophthalmic solution Place 1 drop into both eyes daily as needed for dry eyes       levothyroxine (SYNTHROID/LEVOTHROID) 75 MCG tablet Take 1 tablet (75  mcg) by mouth daily 90 tablet 1     Multiple Vitamins-Minerals (PRESERVISION AREDS) CAPS Take 1 capsule by mouth 2 times daily 30 capsule      nitroGLYcerin (NITROSTAT) 0.4 MG sublingual tablet Place 1 tablet (0.4 mg) under the tongue every 5 minutes as needed for chest pain 25 tablet 0     order for DME Dispense SP Walker-small 1 each 0     order for DME Equipment being ordered: Dispense baffle, for use with nebulizer. 1 each 0     order for DME Equipment being ordered: Nebulizer. Use with Albuterol. 1 each 0     order for DME Equipment being ordered: Dispense face mask.  Mrs. Spicer is immunosuppressed due to rheumatoid arthritis. 1 Box 11     ranibizumab (LUCENTIS) 0.3 MG/0.05ML SOLN 0.05 mLs (0.3 mg) by Intravitreal route See Admin Instructions Every 6 weeks       ranitidine (ZANTAC) 150 MG tablet TAKE ONE TABLET BY MOUTH TWICE DAILY 60 tablet 11     saccharomyces boulardii (FLORASTOR) 250 MG capsule Take 1 capsule (250 mg) by mouth daily 14 capsule      senna-docusate (SENOKOT-S/PERICOLACE) 8.6-50 MG tablet Take 1 tablet by mouth daily as needed for constipation 100 tablet      trimethoprim (TRIMPEX) 100 MG tablet Take 0.5 tablets (50 mg) by mouth daily 45 tablet 1     vitamin C (ASCORBIC ACID) 500 MG tablet Take 1 tablet (500 mg) by mouth daily 30 tablet        ROS:  +fatigue, weight stable, no fevers  No epistaxis  Resp as above- no significant cough  Card as above  No vomiting, melena, hematochezia  +leg edema- improved  No headaches, +occasional dizziness worse with position change    EXAM:  /72 (BP Location: Left arm, Patient Position: Sitting, Cuff Size: Adult Large)   Pulse 69   Wt 73.9 kg (163 lb)   LMP  (LMP Unknown)   SpO2 98%   BMI 28.42 kg/m    General: seated in wheelchair, breathing at times is more rapid than others  HEENT: NC/AT, sclera anicteric, MMM  Card: RRR, no murmur or rub noted  Pulm: bibasilar crackles noted  Abdomen: ND, +BS, soft, NT  Extremities: trace LE edema    Neurological: alert, conversant, moving all extremities equally    Labs:  CBC RESULTS:  Lab Results   Component Value Date    WBC 5.9 12/03/2018    RBC 3.22 (L) 12/03/2018    HGB 10.7 (L) 12/03/2018    HCT 32.6 (L) 12/03/2018     (H) 12/03/2018    MCH 33.2 (H) 12/03/2018    MCHC 32.8 12/03/2018    RDW 13.0 12/03/2018     12/03/2018     CMP RESULTS:  Lab Results   Component Value Date     12/26/2018    POTASSIUM 4.0 12/26/2018    CHLORIDE 104 12/26/2018    CO2 28 12/26/2018    ANIONGAP 6 12/26/2018    GLC 94 12/26/2018    BUN 29 12/26/2018    CR 1.43 (H) 12/26/2018    GFRESTIMATED 33 (L) 12/26/2018    GFRESTBLACK 38 (L) 12/26/2018    FATOUMATA 8.7 12/26/2018    BILITOTAL 0.4 12/02/2018    ALBUMIN 3.1 (L) 12/02/2018    ALKPHOS 57 12/02/2018    ALT 18 12/02/2018    AST 19 12/02/2018        NTBNP 12/1 1238, 11/28 2473    Echocardiogram 11/28/18: increased LV wall thickness, EF 55-60%, global RV function normal. Moderate biatrial enlargement, mild to moderate AI, Trace AI, mild to moderate TR with multiple jets. IVC normal.     Assessment and Plan:    Linda Spicer is a 87 year old female with diastolic heart failure who presents for follow up. Early in the fall she was struggle with recurrent dyspnea with heart failure and atrial arrhythmias. We haven't been too successful in getting her to a stable standpoint at feeling well. We had a long discussion today. Here are some things noted:  -She receives Orencia infusions every 4 weeks. In November in occurred 1-2 days prior to her getting admitted and last week it occurred the day before she called us feeling unwell. This is not a new medication but perhaps her heart isn't tolerating the infusions as it was before. There are no specific issues with the medication that I know of, but it appears she gets a 250mL bolus and then 100mL infusion. I recommend she take 40mg of Lasix those days when she returns home.   -She was seen by valve clinic regarding  possible MitraClip but here more recent Echo when her rhythm was sinus and volume stable was much improved.   -She has been following EP but unfortunately there doesn't seem to be great options for rhythm management with her, will defer to them  -She was noted to have new anemia, macrocytic which should be evaluated- at the level she's at it shouldn't be causing rest dyspnea  -I do suspect she may be having some underlying anxiety- we didn't get to addressing that today, perhaps something her PCP can evaluate with her.   -Her dyspnea in the hospital was out of proportion to her volume status and underlying pulmonary fibrosis. Myasthenia labs were negative.     From a volume standpoint today I think she may have 1-2 pounds, but she is stable and tolerating well. I noted that if her weight rises anymore or she doesn't come down in next few days than can take a one time extra dose of Lasix. She seems to have a narrow window between hyper and hypovolemia.     1. Chronic diastolic heart failure, Stage C, NYHA class IV- confounded by unclear   Fluid status minimally hypervolemic, no change in Lasix now, take extra dose of Lasix on days of infusions  Aldosterone antagonist: deferred due to prior NEIL, continue to defer for now  BP: variable control, last week had a low pulse pressure, this hasn't been noted previously. No aortic disease. Will monitor  Ischemia evaluation: negative nuclear stress in 2016 at [facility]. The first time I met them we discussed whether to evaluate further but she hasn't been too stable. If plan is for MitraClip she will need coronary angiogram. If no MitraClip, then will rediscuss with her and family- nuclear stress test would be our only great non invasive option    Follow up: within 2-3 weeks, pending decision of valve team  I spent 40 minutes with the patient and family, >50% on counseling and education    Priscilla Agrawal PA-C  [facility]

## 2019-01-03 ENCOUNTER — OFFICE VISIT (OUTPATIENT)
Dept: CARDIOLOGY | Facility: CLINIC | Age: 84
End: 2019-01-03
Payer: MEDICARE

## 2019-01-03 VITALS
BODY MASS INDEX: 28.42 KG/M2 | DIASTOLIC BLOOD PRESSURE: 72 MMHG | SYSTOLIC BLOOD PRESSURE: 119 MMHG | OXYGEN SATURATION: 98 % | HEART RATE: 69 BPM | WEIGHT: 163 LBS

## 2019-01-03 DIAGNOSIS — I50.30 HEART FAILURE WITH PRESERVED EJECTION FRACTION (H): ICD-10-CM

## 2019-01-03 DIAGNOSIS — I34.0 NON-RHEUMATIC MITRAL REGURGITATION: Primary | ICD-10-CM

## 2019-01-03 LAB
ANION GAP SERPL CALCULATED.3IONS-SCNC: 8 MMOL/L (ref 3–14)
BUN SERPL-MCNC: 25 MG/DL (ref 7–30)
CALCIUM SERPL-MCNC: 7.9 MG/DL (ref 8.5–10.1)
CHLORIDE SERPL-SCNC: 104 MMOL/L (ref 94–109)
CO2 SERPL-SCNC: 26 MMOL/L (ref 20–32)
CREAT SERPL-MCNC: 1.5 MG/DL (ref 0.52–1.04)
GFR SERPL CREATININE-BSD FRML MDRD: 31 ML/MIN/{1.73_M2}
GLUCOSE SERPL-MCNC: 96 MG/DL (ref 70–99)
POTASSIUM SERPL-SCNC: 4 MMOL/L (ref 3.4–5.3)
SODIUM SERPL-SCNC: 138 MMOL/L (ref 133–144)

## 2019-01-03 PROCEDURE — 80048 BASIC METABOLIC PNL TOTAL CA: CPT | Performed by: PHYSICIAN ASSISTANT

## 2019-01-03 PROCEDURE — 99215 OFFICE O/P EST HI 40 MIN: CPT | Performed by: PHYSICIAN ASSISTANT

## 2019-01-03 PROCEDURE — 36415 COLL VENOUS BLD VENIPUNCTURE: CPT | Performed by: PHYSICIAN ASSISTANT

## 2019-01-03 NOTE — PATIENT INSTRUCTIONS
Cardiology Providers you saw during your visit:  Rochelle Agrawal NP    Results for RG WALTER (MRN [id number]) as of 1/3/2019 10:25   Ref. Range 12/26/2018 09:57   Sodium Latest Ref Range: 133 - 144 mmol/L 138   Potassium Latest Ref Range: 3.4 - 5.3 mmol/L 4.0   Chloride Latest Ref Range: 94 - 109 mmol/L 104   Carbon Dioxide Latest Ref Range: 20 - 32 mmol/L 28   Urea Nitrogen Latest Ref Range: 7 - 30 mg/dL 29   Creatinine Latest Ref Range: 0.52 - 1.04 mg/dL 1.43 (H)   GFR Estimate Latest Ref Range: >60 mL/min/1.73_m2 33 (L)   GFR Estimate If Black Latest Ref Range: >60 mL/min/1.73_m2 38 (L)   Calcium Latest Ref Range: 8.5 - 10.1 mg/dL 8.7   Anion Gap Latest Ref Range: 3 - 14 mmol/L 6   Glucose Latest Ref Range: 70 - 99 mg/dL 94       Medication changes:  On days you have your infusions, take 2 tablets (40mg) Lasix when you get home    Follow up:  We'll be in touch depending on decision from Valve team      Please call if you have:  1. Weight gain of more than 2 pounds in a day or 5 pounds in a week  2. Increased shortness of breath, swelling or bloating  3. Dizziness, lightheadedness   4. Any questions or concerns.     Follow the American Heart Association Diet and Lifestyle recommendations:  Limit saturated fat, trans fat, sodium, red meat, sweets and sugar-sweetened beverages. If you choose to eat red meat, compare labels and select the leanest cuts available.  Aim for at least 150 minutes of moderate physical activity or 75 minutes of vigorous physical activity - or an equal combination of both - each week.    During business hours: [phone], press option # 1 to be routed to the [facility] then option # 3 for medical questions to speak with a nurse.     After hours, weekends or holidays: On Call Cardiologist- [phone]   option #4 and ask to speak to the on-call Cardiologist. Inform them you are a CORE/heart failure patient at the [facility].      Amna Casey, RN  Cardiology Nurse Care  Coordinator    Keep up the good work!    Take Care!

## 2019-01-03 NOTE — LETTER
1/3/2019      RE: Linda Spicer  [address]  [address]  [facility] [id number]       Dear Colleague,    Thank you for the opportunity to participate in the care of your patient, Linda Spicer, at the [facility] HEART AT [facility]. Please see a copy of my visit note below.    CARDIOLOGY CLINIC  Linda Spicer is a 87 year old female with rheumatoid arthritis, sick sinus syndrome s/p pacemaker in 2017, atrial fibrillation on anticoagulation s/p multiple cardioversions, mitral regurgitation and chronic diastolic heart failure who presents for follow up.     I last saw her in early November, since that time she unfortunately had another hospitalization at [facility] from 11/28 to 12/4/2018. She presented with complaint of generalized weakness. Interrogation showed she'd been in atrial fib/flutter since 11/20. She did have a syncopal event after PFTs when standing to get back into bed. She continued to have dyspnea despite not being volume overloaded, repeat Echo showed only mild to moderate MR. Neurology was consulted and labs for myasthenia were negative. Pulmonary was consulted, Chest CT and PFTs were done. 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 Her CRP and ESR were within normal. CT showed only mild progression of fibrosis. A new 5mm nodule noted that will need follow up. It was felt her atrial arrhythmias may be contributing but rhythm control has been difficult, EP does not feel she is candidate for ablation. Her LRL was increased from 60 to 70bpm. She then went to rehab. Weight at 154lbs, Lasix dose at 40mg in AM and 20mg in afternoon. Seen in clinic 12/17 and was feeling bad. Creatinine bumped to 2.1 and she was felt to by hypovolemic. Instructed to hold Lasix for 2 days and resume at 20mg daily thereafter. Saw Dr. Carrasquillo on 12/19 at the [facility], device interrogation noted >100  hours of AT/AF starting on 12/8 which patient notes correlated to her symptoms of feeling unwell. They felt she was hypervolemic so increased her Lasix to 20mg BID. She also had a consultation with the valve clinic regarding her mitral valve, which on YOLANDA back in October was read as severe but on TTE the end of November during hospitalization it was mild to moderate. They are waiting to hear from the valve nurse as to whether MitraClip appropriate or not.    Today she reports feeling well, her 3rd day in a row. She notes that last week she had a rough day. She did call in and speak to one of our nurses who recommended going to the ER but given her previous waits she opted to wait at home with family and then started feeling a bit better. Initially she had high blood pressure but then they noted a blood pressure later of 126/106 which they thought was odd. There were a few days she was having dizziness in the morning when she gets up but that seems better. She denies chest pain.     She's frustrated by her lack of ability to exert herself. She can only walk from car to building without dyspnea. Can't tolerate shopping. She wants to be more active. Her son and daughter are present with her today. She feels her weight is up a little bit. Legs aren't really swollen.     PAST MEDICAL HISTORY:  Past Medical History:   Diagnosis Date     Adjustment disorder with anxious mood 5/18/2015     Advanced directives, counseling/discussion 8/30/2012    Patient states has Advance Directive and will bring in a copy to clinic. 8/30/2012   [facility] Medical Assistant \       Anemia 9/25/2015     Basal cell cancer      CHF (congestive heart failure) (H) 9/18/2014     CKD (chronic kidney disease) stage 3, GFR 30-59 ml/min (H) 9/29/2015     DDD (degenerative disc disease), lumbar 9/25/2015     Diffuse idiopathic pulmonary fibrosis (H) 5/6/2013     Encounter for palliative care 5/18/2015     History of blood transfusion  9/29/2015     Hypertension goal BP (blood pressure) < 140/90 9/7/2012     Hypothyroid 9/7/2012     Irritable bowel syndrome 10/29/2013     Macular degeneration      Macular degeneration, 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     Nondisplaced spiral fracture of shaft of humerus      Osteoporosis 8/13/2013     Im[employer] Update utility     RA (rheumatoid arthritis) (H) 5/7/2013     Rheumatic fever      Shingles      Spinal stenosis of lumbar region with neurogenic claudication 9/14/2015     SOCIAL HISTORY: ,  passed away this past fall. Never a smoker. Loves playing [employer] especially    CURRENT MEDICATIONS:  Current Outpatient Medications   Medication Sig Dispense Refill     abatacept (ORENCIA) 250 MG injection Inject 750 mg into the vein every 28 days 3 each 0     acetaminophen (TYLENOL) 500 MG tablet Take 1 tablet (500 mg) by mouth every 6 hours as needed for mild pain or fever       albuterol (PROVENTIL) (2.5 MG/3ML) 0.083% neb solution Take 1 vial (2.5 mg) by nebulization every 6 hours as needed for shortness of breath / dyspnea or wheezing 360 mL      apixaban ANTICOAGULANT (ELIQUIS) 2.5 MG tablet Take 1 tablet (2.5 mg) by mouth 2 times daily 180 tablet 3     azaTHIOprine (IMURAN) 50 MG tablet Take 1 tablet (50 mg) by mouth daily 90 tablet 2     Blood Pressure Monitor KIT 1 kit daily as needed 1 kit 0     calcium carbonate-vitamin D (OS-FATOUMATA) 500-400 MG-UNIT tablet Take 1 tablet by mouth daily 60 tablet      Carboxymethylcellulose Sod PF (CELLUVISC/REFRESH LIQUIGEL) 1 % ophthalmic gel Place 1 drop into both eyes daily as needed for dry eyes 1 Bottle      carvedilol (COREG) 3.125 MG tablet Take 1 tablet (3.125 mg) by mouth 2 times daily (with meals) 60 tablet 11     cetirizine (ZYRTEC ALLERGY) 10 MG tablet Take 1 tablet (10 mg) by mouth At Bedtime 30 tablet      denosumab (PROLIA) 60 MG/ML SOLN injection Inject 1 mL (60 mg) Subcutaneous every 6 months 1 mL      folic acid (FOLVITE) 1 MG tablet Take 1 tablet (1 mg) by  mouth daily 90 tablet 3     furosemide (LASIX) 20 MG tablet Take 1 tablet (20 mg) by mouth 2 times daily Hold Lasix 12/18 and 12/19, then start 20 mg daily after that. 60 tablet 0     hypromellose (GENTEAL) 0.3 % SOLN ophthalmic solution Place 1 drop into both eyes daily as needed for dry eyes       levothyroxine (SYNTHROID/LEVOTHROID) 75 MCG tablet Take 1 tablet (75 mcg) by mouth daily 90 tablet 1     Multiple Vitamins-Minerals (PRESERVISION AREDS) CAPS Take 1 capsule by mouth 2 times daily 30 capsule      nitroGLYcerin (NITROSTAT) 0.4 MG sublingual tablet Place 1 tablet (0.4 mg) under the tongue every 5 minutes as needed for chest pain 25 tablet 0     order for DME Dispense SP Walker-al[place] 1 each 0     order for DME Equipment being ordered: Dispense baffle, for use with nebulizer. 1 each 0     order for DME Equipment being ordered: Nebulizer. Use with Albuterol. 1 each 0     order for DME Equipment being ordered: Dispense face mask.  Mrs. Spicer is immunosuppressed due to rheumatoid arthritis. 1 Box 11     ranibizumab (LUCENTIS) 0.3 MG/0.05ML SOLN 0.05 mLs (0.3 mg) by Intravitreal route See Admin Instructions Every 6 weeks       ranitidine (ZANTAC) 150 MG tablet TAKE ONE TABLET BY MOUTH TWICE DAILY 60 tablet 11     saccharomyces boulardii (FLORASTOR) 250 MG capsule Take 1 capsule (250 mg) by mouth daily 14 capsule      senna-docusate (SENOKOT-S/PERICOLACE) 8.6-50 MG tablet Take 1 tablet by mouth daily as needed for constipation 100 tablet      trimethoprim (TRIMPEX) 100 MG tablet Take 0.5 tablets (50 mg) by mouth daily 45 tablet 1     vitamin C (ASCORBIC ACID) 500 MG tablet Take 1 tablet (500 mg) by mouth daily 30 tablet        ROS:  +fatigue, weight stable, no fevers  No epistaxis  Resp as above- no significant cough  Card as above  No vomiting, melena, hematochezia  +leg edema- improved  No headaches, +occasional dizziness worse with position change    EXAM:  /72 (BP Location: Left arm, Patient Position:  Sitting, Cuff Size: Adult Large)   Pulse 69   Wt 73.9 kg (163 lb)   LMP  (LMP Unknown)   SpO2 98%   BMI 28.42 kg/m     General: seated in wheelchair, breathing at times is more rapid than others  HEENT: NC/AT, sclera anicteric, MMM  Card: RRR, no murmur or rub noted  Pulm: bibasilar crackles noted  Abdomen: ND, +BS, soft, NT  Extremities: trace LE edema   Neurological: alert, conversant, moving all extremities equally    Labs:  CBC RESULTS:  Lab Results   Component Value Date    WBC 5.9 12/03/2018    RBC 3.22 (L) 12/03/2018    HGB 10.7 (L) 12/03/2018    HCT 32.6 (L) 12/03/2018     (H) 12/03/2018    MCH 33.2 (H) 12/03/2018    MCHC 32.8 12/03/2018    RDW 13.0 12/03/2018     12/03/2018     CMP RESULTS:  Lab Results   Component Value Date     12/26/2018    POTASSIUM 4.0 12/26/2018    CHLORIDE 104 12/26/2018    CO2 28 12/26/2018    ANIONGAP 6 12/26/2018    GLC 94 12/26/2018    BUN 29 12/26/2018    CR 1.43 (H) 12/26/2018    GFRESTIMATED 33 (L) 12/26/2018    GFRESTBLACK 38 (L) 12/26/2018    FATOUMATA 8.7 12/26/2018    BILITOTAL 0.4 12/02/2018    ALBUMIN 3.1 (L) 12/02/2018    ALKPHOS 57 12/02/2018    ALT 18 12/02/2018    AST 19 12/02/2018        NTBNP 12/1 1238, 11/28 2473    Echocardiogram 11/28/18: increased LV wall thickness, EF 55-60%, global RV function normal. Moderate biatrial enlargement, mild to moderate AI, Trace AI, mild to moderate TR with multiple jets. IVC normal.     Assessment and Plan:    Linda Spicer is a 87 year old female with diastolic heart failure who presents for follow up. Early in the fall she was struggle with recurrent dyspnea with heart failure and atrial arrhythmias. We haven't been too successful in getting her to a stable standpoint at feeling well. We had a long discussion today. Here are some things noted:  -She receives Orencia infusions every 4 weeks. In November in occurred 1-2 days prior to her getting admitted and last week it occurred the day before she called us  feeling unwell. This is not a new medication but perhaps her heart isn't tolerating the infusions as it was before. There are no specific issues with the medication that I know of, but it appears she gets a 250mL bolus and then 100mL infusion. I recommend she take 40mg of Lasix those days when she returns home.   -She was seen by valve clinic regarding possible MitraClip but here more recent Echo when her rhythm was sinus and volume stable was much improved.   -She has been following EP but unfortunately there doesn't seem to be great options for rhythm management with her, will defer to them  -She was noted to have new anemia, macrocytic which should be evaluated- at the level she's at it shouldn't be causing rest dyspnea  -I do suspect she may be having some underlying anxiety- we didn't get to addressing that today, perhaps something her PCP can evaluate with her.   -Her dyspnea in the hospital was out of proportion to her volume status and underlying pulmonary fibrosis. Myasthenia labs were negative.     From a volume standpoint today I think she may have 1-2 pounds, but she is stable and tolerating well. I noted that if her weight rises anymore or she doesn't come down in next few days than can take a one time extra dose of Lasix. She seems to have a narrow window between hyper and hypovolemia.     1. Chronic diastolic heart failure, Stage C, NYHA class IV- confounded by unclear   Fluid status minimally hypervolemic, no change in Lasix now, take extra dose of Lasix on days of infusions  Aldosterone antagonist: deferred due to prior NEIL, continue to defer for now  BP: variable control, last week had a low pulse pressure, this hasn't been noted previously. No aortic disease. Will monitor  Ischemia evaluation: negative nuclear stress in 2016 at [facility]. The first time I met them we discussed whether to evaluate further but she hasn't been too stable. If plan is for MitraClip she will need coronary  angiogram. If no MitraClip, then will rediscuss with her and family- nuclear stress test would be our only great non invasive option    Follow up: within 2-3 weeks, pending decision of valve team  I spent 40 minutes with the patient and family, >50% on counseling and education    Priscilla Agrawal PA-C  [facility]

## 2019-01-03 NOTE — NURSING NOTE
"Chief Complaint   Patient presents with     Heart Failure     Return CORE, 86 yo female, Diastolic HF, EF 55-60, labs prior. Per patient sob. dizzy occationally and heart pound sound .       Initial /72 (BP Location: Left arm, Patient Position: Sitting, Cuff Size: Adult Large)   Pulse 69   Wt 73.9 kg (163 lb)   LMP  (LMP Unknown)   SpO2 98%   BMI 28.42 kg/m   Estimated body mass index is 28.42 kg/m  as calculated from the following:    Height as of 12/20/18: 1.613 m (5' 3.5\").    Weight as of this encounter: 73.9 kg (163 lb)..  BP completed using cuff size: large  Patient here with another person, IPV. Screen not initiated. Alexander Melendez LPN.      Alexander MCKINNEYPDrissN.    "

## 2019-01-04 ENCOUNTER — CARE COORDINATION (OUTPATIENT)
Dept: CARDIOLOGY | Facility: CLINIC | Age: 84
End: 2019-01-04

## 2019-01-04 ENCOUNTER — TELEPHONE (OUTPATIENT)
Dept: FAMILY MEDICINE | Facility: CLINIC | Age: 84
End: 2019-01-04

## 2019-01-04 ENCOUNTER — PATIENT OUTREACH (OUTPATIENT)
Dept: CARE COORDINATION | Facility: CLINIC | Age: 84
End: 2019-01-04

## 2019-01-04 ASSESSMENT — ACTIVITIES OF DAILY LIVING (ADL): DEPENDENT_IADLS:: COOKING;CLEANING;LAUNDRY;SHOPPING;MEDICATION MANAGEMENT;MONEY MANAGEMENT;TRANSPORTATION

## 2019-01-04 NOTE — TELEPHONE ENCOUNTER
Please let patient know that it is okay to increase the vitamin C to 1000 mg daily. No problems with this.    Obi Sharma MD

## 2019-01-04 NOTE — TELEPHONE ENCOUNTER
RN CC informed patient of Dr. Sharma's message below.  Patient verbalized understanding.    Melissa Behl BSN, RN, N  [facility] Care Coordinator  [phone]

## 2019-01-04 NOTE — PROGRESS NOTES
After reviewing echo on 11/29/18, Dr. Watson and Dr. Quigley feel it is still appropriate to proceed with MitraClip.    Arjun was called and given this information.  Arjun also mentioned how there will be a plan of increase in diuretics for a couple of days at the time of the pt's (his mom's) infusions and he is encouraged that this may help his mom's bouts with heart failure.      He stated that he also knows that in the future his mom may be needed this procedure and that she may be more frail at the time of the procedure.    He states that he wants to have conversations with his mom, and his siblings, and find out what his mom's wishes are regarding the procedure.    In the meantime, Linda's case will be discussed at an upcoming Valve conference and I will be contacting Arjun with the discussion points afterward.

## 2019-01-04 NOTE — TELEPHONE ENCOUNTER
"Patient calls into RN CC to report 1 day of runny nose with body chills.  Patient has not checked her fever, but denies feeling \"feverish.\"  Patient denies any worsening of her baseline shortness of breath or any cough.    Patient inquired if she could increase her Vitamin C from 500 mg daily to 1000 mg daily or more \"to prevent it from getting worse.\"  RN CC also provided patient with the 24 hour nurse triage line and educated patient on home care measures.    Please advise if patient may increase in Vitamin C from 500 mg to 1000 mg or more daily while she is feeling ill.    Melissa Behl BSN, RN, N  [facility] Care Coordinator  [phone]     "

## 2019-01-05 ENCOUNTER — HOSPITAL ENCOUNTER (EMERGENCY)
Facility: CLINIC | Age: 84
Discharge: HOME | End: 2019-01-05
Attending: EMERGENCY MEDICINE | Admitting: EMERGENCY MEDICINE
Payer: MEDICARE

## 2019-01-05 VITALS
HEIGHT: 64 IN | SYSTOLIC BLOOD PRESSURE: 188 MMHG | RESPIRATION RATE: 16 BRPM | DIASTOLIC BLOOD PRESSURE: 81 MMHG | BODY MASS INDEX: 27.83 KG/M2 | TEMPERATURE: 97.8 F | OXYGEN SATURATION: 98 % | WEIGHT: 163 LBS | HEART RATE: 72 BPM

## 2019-01-05 DIAGNOSIS — I10 ESSENTIAL HYPERTENSION: ICD-10-CM

## 2019-01-05 LAB
CA-I BLD-SCNC: 4.4 MG/DL (ref 4.4–5.2)
CO2 BLDCOV-SCNC: 28 MMOL/L (ref 21–28)
CREAT BLD-MCNC: 1.4 MG/DL (ref 0.52–1.04)
GFR SERPL CREATININE-BSD FRML MDRD: 36 ML/MIN/{1.73_M2}
GLUCOSE BLD-MCNC: 81 MG/DL (ref 70–99)
HCT VFR BLD CALC: 34 %PCV (ref 35–47)
HGB BLD CALC-MCNC: 11.6 G/DL (ref 11.7–15.7)
PCO2 BLDV: 47 MM HG (ref 40–50)
PH BLDV: 7.38 PH (ref 7.32–7.43)
PO2 BLDV: 25 MM HG (ref 25–47)
POTASSIUM BLD-SCNC: 3.9 MMOL/L (ref 3.4–5.3)
SAO2 % BLDV FROM PO2: 43 %
SODIUM BLD-SCNC: 139 MMOL/L (ref 133–144)
TROPONIN I BLD-MCNC: 0.02 UG/L (ref 0–0.08)

## 2019-01-05 PROCEDURE — 36415 COLL VENOUS BLD VENIPUNCTURE: CPT | Performed by: EMERGENCY MEDICINE

## 2019-01-05 PROCEDURE — 93005 ELECTROCARDIOGRAM TRACING: CPT | Performed by: EMERGENCY MEDICINE

## 2019-01-05 PROCEDURE — 82330 ASSAY OF CALCIUM: CPT

## 2019-01-05 PROCEDURE — 84484 ASSAY OF TROPONIN QUANT: CPT

## 2019-01-05 PROCEDURE — 40000501 ZZHCL STATISTIC HEMATOCRIT ED POCT

## 2019-01-05 PROCEDURE — 82565 ASSAY OF CREATININE: CPT

## 2019-01-05 PROCEDURE — 25000132 ZZH RX MED GY IP 250 OP 250 PS 637: Mod: GY | Performed by: EMERGENCY MEDICINE

## 2019-01-05 PROCEDURE — 40000497 ZZHCL STATISTIC SODIUM ED POCT

## 2019-01-05 PROCEDURE — 40000502 ZZHCL STATISTIC GLUCOSE ED POCT

## 2019-01-05 PROCEDURE — 93010 ELECTROCARDIOGRAM REPORT: CPT | Mod: Z6 | Performed by: EMERGENCY MEDICINE

## 2019-01-05 PROCEDURE — 99283 EMERGENCY DEPT VISIT LOW MDM: CPT | Mod: 25 | Performed by: EMERGENCY MEDICINE

## 2019-01-05 PROCEDURE — A9270 NON-COVERED ITEM OR SERVICE: HCPCS | Mod: GY | Performed by: EMERGENCY MEDICINE

## 2019-01-05 PROCEDURE — 99284 EMERGENCY DEPT VISIT MOD MDM: CPT | Performed by: EMERGENCY MEDICINE

## 2019-01-05 PROCEDURE — 82803 BLOOD GASES ANY COMBINATION: CPT

## 2019-01-05 PROCEDURE — 40000498 ZZHCL STATISTIC POTASSIUM ED POCT

## 2019-01-05 RX ORDER — AMLODIPINE BESYLATE 2.5 MG/1
2.5 TABLET ORAL DAILY
Qty: 30 TABLET | Refills: 0 | Status: SHIPPED | OUTPATIENT
Start: 2019-01-05 | End: 2019-01-08

## 2019-01-05 RX ORDER — AMLODIPINE BESYLATE 2.5 MG/1
2.5 TABLET ORAL ONCE
Status: COMPLETED | OUTPATIENT
Start: 2019-01-05 | End: 2019-01-05

## 2019-01-05 RX ADMIN — AMLODIPINE BESYLATE 2.5 MG: 2.5 TABLET ORAL at 16:54

## 2019-01-05 ASSESSMENT — MIFFLIN-ST. JEOR: SCORE: 1151.42

## 2019-01-05 NOTE — ED TRIAGE NOTES
"Triage Assessment & Note:    /67   Pulse 70   Temp 97.6  F (36.4  C) (Oral)   Resp 16   Ht 1.613 m (5' 3.5\")   Wt 73.9 kg (163 lb)   LMP  (LMP Unknown)   SpO2 98%   BMI 28.42 kg/m        Patient presents with: Pt with cardiac hx comes to triage with reports of hypertension.  Pt called cardiac team and was told to come in for evaluation.  No reports of fever, cough, SOB, or CP    Home Treatments/Remedies: home medications    Febrile / Afebrile: afebrile    Duration of C/o: < 24 hours    Karol Rondon RN  January 5, 2019        "

## 2019-01-05 NOTE — ED AVS SNAPSHOT
[facility], [place], Emergency Department  [address] [place] [id number]  Phone:  [phone]                                    Linda Spicer   MRN: [id number]    Department:  [facility], Emergency Department   Date of Visit:  1/5/2019           After Visit Summary Signature Page    I have received my discharge instructions, and my questions have been answered. I have discussed any challenges I see with this plan with the nurse or doctor.    ..........................................................................................................................................  Patient/Patient Representative Signature      ..........................................................................................................................................  Patient Representative Print Name and Relationship to Patient    ..................................................               ................................................  Date                                   Time    ..........................................................................................................................................  Reviewed by Signature/Title    ...................................................              ..............................................  Date                                               Time          22EPIC Rev 08/18

## 2019-01-05 NOTE — DISCHARGE INSTRUCTIONS
Take your blood pressure every day, about the same time of day, and record it for your follow-up appointment    Please make an appointment to follow up with Your Primary Care Provider in one week for recheck.

## 2019-01-05 NOTE — ED PROVIDER NOTES
History     Chief Complaint   Patient presents with     Hypertension     HPI  Linda Spicer is a 87 year old female who presents to the ER for evaluation of her blood pressure which seems to be creeping upwards over the last week.  Patient complains of some occasional biparietal headaches associated with an elevated blood pressure but has had no focal neurologic symptoms, no blurriness of vision no nausea or vomiting and denies any chest pain or pressure.  Patient also denies any shortness of breath.  Patient currently is primarily controlled with Coreg.  Patient does have nitro to take but rarely takes it at home    I have reviewed the Medications, Allergies, Past Medical and Surgical History, and Social History in the [employer] system.    Past Medical History:   Diagnosis Date     Adjustment disorder with anxious mood 5/18/2015     Advanced directives, counseling/discussion 8/30/2012    Patient states has Advance Directive and will bring in a copy to clinic. 8/30/2012   [facility] Medical Assistant \       Anemia 9/25/2015     Basal cell cancer      CHF (congestive heart failure) (H) 9/18/2014     CKD (chronic kidney disease) stage 3, GFR 30-59 ml/min (H) 9/29/2015     DDD (degenerative disc disease), lumbar 9/25/2015     Diffuse idiopathic pulmonary fibrosis (H) 5/6/2013     Encounter for palliative care 5/18/2015     History of blood transfusion 9/29/2015     Hypertension goal BP (blood pressure) < 140/90 9/7/2012     Hypothyroid 9/7/2012     Irritable bowel syndrome 10/29/2013     Macular degeneration      Macular degeneration, 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     Nondisplaced spiral fracture of shaft of humerus      Osteoporosis 8/13/2013     Imo Update utility     RA (rheumatoid arthritis) (H) 5/7/2013     Rheumatic fever      Shingles      Spinal stenosis of lumbar region with neurogenic claudication 9/14/2015         abatacept (ORENCIA) 250 MG injection Inject 750 mg into the vein every 28 days   acetaminophen  (TYLENOL) 500 MG tablet Take 1 tablet (500 mg) by mouth every 6 hours as needed for mild pain or fever   albuterol (PROVENTIL) (2.5 MG/3ML) 0.083% neb solution Take 1 vial (2.5 mg) by nebulization every 6 hours as needed for shortness of breath / dyspnea or wheezing   apixaban ANTICOAGULANT (ELIQUIS) 2.5 MG tablet Take 1 tablet (2.5 mg) by mouth 2 times daily   azaTHIOprine (IMURAN) 50 MG tablet Take 1 tablet (50 mg) by mouth daily   calcium carbonate-vitamin D (OS-FATOUMATA) 500-400 MG-UNIT tablet Take 1 tablet by mouth daily   Carboxymethylcellulose Sod PF (CELLUVISC/REFRESH LIQUIGEL) 1 % ophthalmic gel Place 1 drop into both eyes daily as needed for dry eyes   carvedilol (COREG) 3.125 MG tablet Take 1 tablet (3.125 mg) by mouth 2 times daily (with meals)   cetirizine (ZYRTEC ALLERGY) 10 MG tablet Take 1 tablet (10 mg) by mouth At Bedtime   denosumab (PROLIA) 60 MG/ML SOLN injection Inject 1 mL (60 mg) Subcutaneous every 6 months   folic acid (FOLVITE) 1 MG tablet Take 1 tablet (1 mg) by mouth daily   furosemide (LASIX) 20 MG tablet Take 1 tablet (20 mg) by mouth 2 times daily Hold Lasix 12/18 and 12/19, then start 20 mg daily after that.   hypromellose (GENTEAL) 0.3 % SOLN ophthalmic solution Place 1 drop into both eyes daily as needed for dry eyes   levothyroxine (SYNTHROID/LEVOTHROID) 75 MCG tablet Take 1 tablet (75 mcg) by mouth daily   Multiple Vitamins-Minerals (PRESERVISION AREDS) CAPS Take 1 capsule by mouth 2 times daily   ranibizumab (LUCENTIS) 0.3 MG/0.05ML SOLN 0.05 mLs (0.3 mg) by Intravitreal route See Admin Instructions Every 6 weeks   ranitidine (ZANTAC) 150 MG tablet TAKE ONE TABLET BY MOUTH TWICE DAILY   saccharomyces boulardii (FLORASTOR) 250 MG capsule Take 1 capsule (250 mg) by mouth daily   trimethoprim (TRIMPEX) 100 MG tablet Take 0.5 tablets (50 mg) by mouth daily   vitamin C (ASCORBIC ACID) 500 MG tablet Take 1 tablet (500 mg) by mouth daily   Blood Pressure Monitor KIT 1 kit daily as needed    nitroGLYcerin (NITROSTAT) 0.4 MG sublingual tablet Place 1 tablet (0.4 mg) under the tongue every 5 minutes as needed for chest pain   order for DME Dispense SP Walker-small   order for DME Equipment being ordered: Dispense baffle, for use with nebulizer.   order for DME Equipment being ordered: Nebulizer. Use with Albuterol.   order for DME Equipment being ordered: Dispense face mask.Mrs. Spicer is immunosuppressed due to rheumatoid arthritis.   senna-docusate (SENOKOT-S/PERICOLACE) 8.6-50 MG tablet Take 1 tablet by mouth daily as needed for constipation     Allergies   Allergen Reactions     Cephalexin Hcl Diarrhea     Gabapentin Other (See Comments)     Dizzsiness     Naproxen GI Disturbance     Perfume      Macrobid [Nitrofurantoin Anhydrous]      Possibly related to lung disease      Seasonal Allergies      Sulfa Drugs      Throat swelling     Xanax [Alprazolam] Other (See Comments)     Dizziness      Ciprofloxacin Itching and Rash     Social History     Socioeconomic History     Marital status:      Spouse name: Not on file     Number of children: Not on file     Years of education: Not on file     Highest education level: Not on file   Social Needs     Financial resource strain: Not on file     Food insecurity - worry: Not on file     Food insecurity - inability: Not on file     Transportation needs - medical: Not on file     Transportation needs - non-medical: Not on file   Occupational History     Not on file   Tobacco Use     Smoking status: Never Smoker     Smokeless tobacco: Never Used   Substance and Sexual Activity     Alcohol use: Yes     Comment: rare wine      Drug use: No     Sexual activity: No   Other Topics Concern     Parent/sibling w/ CABG, MI or angioplasty before 65F 55M? No   Social History Narrative    . Has six children. She enjoys bridge and genealogy.  passed away.  Her son has financial and alcohol issues.     Past Surgical History:   Procedure Laterality Date      "ANESTHESIA CARDIOVERSION N/A 9/14/2018    Procedure: ANESTHESIA CARDIOVERSION;;  Surgeon: GENERIC ANESTHESIA PROVIDER;  Location: [facility] OR     ANESTHESIA CARDIOVERSION N/A 10/11/2018    Procedure: ANESTHESIA CARDIOVERSION;  Cardioversion;  Surgeon: GENERIC ANESTHESIA PROVIDER;  Location: [facility] OR     ANESTHESIA CARDIOVERSION N/A 10/16/2018    Procedure: Anesthesia Cardioversion ;  Surgeon: GENERIC ANESTHESIA PROVIDER;  Location: [facility] OR     APPENDECTOMY       BIOPSY      hemorrhoidectomy     ENT SURGERY      tonsillectomy     GYN SURGERY      3 D & C's     HYSTERECTOMY, PAP NO LONGER INDICATED       LAMINECTOMY LUMBAR ONE LEVEL N/A 10/13/2015    Procedure: LAMINECTOMY LUMBAR ONE LEVEL;  Surgeon: Fransico Toussaint MD;  Location: [facility] OR     Family History   Problem Relation Age of Onset     Hypertension Mother      Psychotic Disorder Father      Diabetes Son      Diabetes Daughter      Blood Disease Daughter        Review of Systems   All other systems reviewed and are negative.      Physical Exam   BP: 177/67  Pulse: 70  Heart Rate: 69  Temp: 97.6  F (36.4  C)  Resp: 16  Height: 161.3 cm (5' 3.5\")  Weight: 73.9 kg (163 lb)  SpO2: 98 %      Physical Exam   Constitutional: She is oriented to person, place, and time.   Elderly alert conversant pleasant comfortable   HENT:   Head: Atraumatic.   Eyes: EOM are normal. Pupils are equal, round, and reactive to light.   Neck: Neck supple. No JVD present.   Pulmonary/Chest: She has no wheezes. She has no rales.   Good breath sounds bilaterally   Abdominal: Soft.   Musculoskeletal: She exhibits no edema or deformity.   Neurological: She is alert and oriented to person, place, and time.   Grossly intact and symmetric   Skin: Skin is warm.   Psychiatric: She has a normal mood and affect.       ED Course        Procedures          EKG revealed a atrially paced rhythm at a rate of 71.  The patient had some inverted T waves in the inferior and lateral leads but these were consistent " with previous EKGs.  There were no new acute changes.  This was read by me personally.        Labs Ordered and Resulted from Time of ED Arrival Up to the Time of Departure from the ED   CREATININE POCT - Abnormal; Notable for the following components:       Result Value    Creatinine 1.4 (*)     GFR Estimate 36 (*)     GFR Estimate If Black 43 (*)     All other components within normal limits   ISTAT GASES ELEC ICA GLUC PHILIPPE POCT - Abnormal; Notable for the following components:    Hemoglobin 11.6 (*)     Hematocrit - POCT 34 (*)     All other components within normal limits   ISTAT CREATININE NURSING POCT   ISTAT TROPONIN NURSING POCT   ISTAT GAS OR ELECTROLYTE NURSING POCT   TROPONIN POCT         Assessments & Plan (with Medical Decision Making)     I have reviewed the nursing notes.    Medications   amLODIPine (NORVASC) tablet 2.5 mg (not administered)   pending admin    I have reviewed the findings, diagnosis, plan and need for follow up with the patient.       Medication List      Started    amLODIPine 2.5 MG tablet  Commonly known as:  NORVASC  2.5 mg, Oral, DAILY            Final diagnoses:   Essential hypertension     Take your blood pressure every day, about the same time of day, and record it for your follow-up appointment    Please make an appointment to follow up with Your Primary Care Provider in one week for recheck.    Clarence Hughes MD      1/5/2019   [facility], [place], EMERGENCY DEPARTMENT     Clarence Hughes MD  01/05/19 [id number]

## 2019-01-07 ENCOUNTER — PATIENT OUTREACH (OUTPATIENT)
Dept: CARE COORDINATION | Facility: CLINIC | Age: 84
End: 2019-01-07

## 2019-01-07 LAB — INTERPRETATION ECG - MUSE: NORMAL

## 2019-01-07 ASSESSMENT — ACTIVITIES OF DAILY LIVING (ADL): DEPENDENT_IADLS:: COOKING;CLEANING;LAUNDRY;SHOPPING;MEDICATION MANAGEMENT;MONEY MANAGEMENT;TRANSPORTATION

## 2019-01-07 NOTE — PROGRESS NOTES
"Clinic Care Coordination Contact  Care Team Conversations    Patient calls into RN CC to report 1 day of runny nose with body chills.  Patient has not checked her fever, but denies feeling \"feverish.\"  Patient denies any worsening of her baseline shortness of breath or any cough.    Patient inquired if she could increase her Vitamin C from 500 mg daily to 1000 mg daily or more \"to prevent it from getting worse.\"  RN CC also provided patient with the 24 hour nurse triage line and educated patient on home care measures.    See 1/4/19 telephone encounter sent to PCP.    Melissa Behl BSN, RN, N  [facility] Care Coordinator  [phone]       "

## 2019-01-07 NOTE — PROGRESS NOTES
Clinic Care Coordination Contact    Clinic Care Coordination Contact  OUTREACH    Referral Information:  Referral Source: IP Report    Primary Diagnosis: CHF    Chief Complaint   Patient presents with     Clinic Care Coordination - Post Hospital     RN ED f/u        [place] Utilization:Patient is followed by cardiology, rheumatology, endocrinology, urology and pulmonology    Clinic Utilization  Difficulty keeping appointments:: No  Compliance Concerns: No  No-Show Concerns: No  No PCP office visit in Past Year: No  Utilization    Last refreshed: 1/7/2019  7:51 AM:  Hospital Admissions 3           Last refreshed: 1/7/2019  7:51 AM:  ED Visits 1           Last refreshed: 1/7/2019  7:51 AM:  No Show Count (past year) 2              Current as of: 1/7/2019  7:51 AM              Clinical Concerns:  Current Medical Concerns: Patient was [facility] ED 1/5/19 for hypertension.  Patient was restarted on amlodipine 5 mg daily.  Patient states she has been feeling better since Saturday, however, had increase in edema this morning due to eating at [employer] yesterday.  Patient states she took an extra Lasix as instructed by her cardiology provider and this is now coming down.  Patient states she is always careful about salt, however, yesterday was a Ana treat to her from her kids.  Patient will continue to follow a low sodium diet going forward.  Patient Active Problem List   Diagnosis     Hypertension goal BP (blood pressure) < 150/90     Hypothyroidism     Macular degeneration, left eye     Irritable bowel syndrome     Encounter for palliative care     Adjustment disorder with anxious mood     Mild anemia     DDD (degenerative disc disease), lumbar     CKD (chronic kidney disease) stage 3, GFR 30-59 ml/min (H)     History of blood transfusion     Aftercare following surgery     S/P lumbar laminectomy     High risk medication use     Age-related osteoporosis without current pathological fracture     Atrophic vaginitis      Fecal incontinence     Female stress incontinence     Impingement syndrome of both shoulders     UIP (usual interstitial pneumonitis) (H)     High risk medications (not anticoagulants) long-term use     Heart failure with preserved ejection fraction (H)     Congestive heart failure with preserved LV function, NYHA class 3 (H)     Other specified hypothyroidism     Rheumatoid arthritis involving multiple sites with positive rheumatoid factor (H)     Health Care Home     Sick sinus syndrome (H)     Cardiac pacemaker - Medtronic dual lead pacemaker - Not Dependent - MRI Safe     Immunosuppression (H)     ILD (interstitial lung disease) (H)     Heart failure with preserved ejection fraction, NYHA class I (H)     Atrial flutter (H)     Paroxysmal atrial fibrillation (H)     CHF exacerbation (H)     Pre-syncope     (HFpEF) heart failure with preserved ejection fraction (H)     Mitral regurgitation      Current Behavioral Concerns: n/a      Education Provided to patient: RN CC reviewed importance of following low sodium diet and reviewed ED discharge instructions.  Patient requested RN CC contact her daughter about the needed ED follow up appointment.  RN CC spoke with patient's daughter Xi (consent to communicate on file) who states she is planning on scheduling a f/u with the [facility] within 1 week for patient's ED follow up.   Pain  Pain (GOAL):: No  Health Maintenance Reviewed: Due/Overdue   Health Maintenance Due   Topic Date Due     ZOSTER IMMUNIZATION (1 of 2) 06/[birth year]/1981     MICROALBUMIN Q1 YEAR  07/26/2018      Clinical Pathway: None    Medication Management:  Patient's children set up her medications for her in a weekly pill planner and patient verbalizes understanding and adherence to medication regimen.     Functional Status:  Dependent ADLs:: Bathing, Ambulation-walker  Dependent IADLs:: Cooking, Cleaning, Laundry, Shopping, Medication Management, Money Management, Transportation  Bed or wheelchair  confined:: No  Mobility Status: Independent w/Device    Living Situation:  Current living arrangement:: I live alone  Type of residence:: Independent Senior Living    Diet/Exercise/Sleep:  Diet:: Low cholesterol, Low saturated fat, No added salt  Inadequate nutrition (GOAL):: No  Food Insecurity: No  Tube Feeding: No  Exercise:: Currently not exercising  Inadequate activity/exercise (GOAL):: No  Significant changes in sleep pattern (GOAL): No    Transportation:  Transportation concerns (GOAL):: No  Transportation means:: Family, [place] mobility     Psychosocial:  Hinduism or spiritual beliefs that impact treatment:: No  Mental health DX:: No  Mental health management concern (GOAL):: No  Informal Support system:: [place] based, Family, Friends, Neighbors, Spouse     Financial/Insurance:   Financial/Insurance concerns (GOAL):: No       Resources and Interventions:  Current Resources:    ;   Community Resources: None  Supplies used at home:: None  Equipment Currently Used at Home: raised toilet, shower chair, walker, rolling, wheelchair, power    Advance Care Plan/Directive  Advanced Care Plans/Directives on file:: In process  Advanced Care Plan/Directive Status: In Process    Referrals Placed: None     Goals: none at this time        Patient/Caregiver understanding: Patient verbalized understanding of current plan of care.    Outreach Frequency: 2 weeks  Future Appointments              In 1 week Mario Davenport MD [facility] [place]    In 2 weeks [address]    In 4 weeks DEVICE CHECK RN CARD [facility] [place], [facility]    In 1 month Asia Steven PA-C [facility]    In 1 month Asia Steven PA-C [facility]    In 2 months Minna Gallagher APRN CNP [facility], [facility]    In 2 months  [facility]          Plan:   1. Patient will take new medication amlodipine as prescribed.  2. Patient's daughter will schedule an ED f/u within 1 week.  3. Patient will continue to monitor her blood pressure at the same time every day.  4. Patient will continue to follow a low sodium diet.  5. RN CC will follow up with patient in 2-3 weeks.    Melissa Behl BSN, RN, N  [facility] Care Coordinator  [phone]

## 2019-01-08 ENCOUNTER — CARE COORDINATION (OUTPATIENT)
Dept: CARDIOLOGY | Facility: CLINIC | Age: 84
End: 2019-01-08

## 2019-01-08 ENCOUNTER — MEDICAL CORRESPONDENCE (OUTPATIENT)
Dept: HEALTH INFORMATION MANAGEMENT | Facility: CLINIC | Age: 84
End: 2019-01-08

## 2019-01-08 ENCOUNTER — TELEPHONE (OUTPATIENT)
Dept: CARDIOLOGY | Facility: CLINIC | Age: 84
End: 2019-01-08

## 2019-01-08 DIAGNOSIS — I50.30 HEART FAILURE WITH PRESERVED EJECTION FRACTION (H): Primary | ICD-10-CM

## 2019-01-08 RX ORDER — HYDRALAZINE HYDROCHLORIDE 10 MG/1
10 TABLET, FILM COATED ORAL 3 TIMES DAILY
Qty: 90 TABLET | Refills: 11 | Status: ON HOLD | OUTPATIENT
Start: 2019-01-08 | End: 2019-01-20

## 2019-01-08 NOTE — PROGRESS NOTES
Incoming call from iLnda with complaints of high blood pressure.   She states she went to the ER on the 5th when her reading was 198/113.   Today her morning reading was 143/107. She took her BP at noon and it was 183/103. She said last two days readings were 143/86 and 129/80. She states when she saw Rochelle she was advised that she could take an extra 20 mg of Lasix when her blood pressure was high so she did that the last few days.   Her weights not gone down with the extra Lasix, she was 158.2 on the 6th and is 159.2 lbs today.   She is having some dizziness and c/o slight chest pressure similar to when she is in afib. She thinks her afib is acting up again.  Is SOB on the phone, talking fast and sounds anxious.   Told her to try to relax through the day. If symptoms worsen she should go to ER.   Advised her I would review with Rochelle to see if she had any further recommendations and we would call her back either later today or tomorrow morning.     Lizett Zapata RN

## 2019-01-08 NOTE — PROGRESS NOTES
Per Rochelle Agrawal, called son Arjun to review possible medication changes. Rochelle would recommend switching from Norvasc to Hydralazine 3 times daily if he feels his mom could do TID dosing. If not can switch to Imdur 30 mg daily. Left voicemail and asked for call back.     Called Linda to review Rochelle's recommendations. She states she hadn't yet started the Norvasc because she wanted to see what Rochelle thought of it. We reviewed indications for starting Hydralazine to help with afterload. She is agreeable to this and states she has no problems with three time a day medication. Told her to have Xi or Arjun call us if they have any questions.     Date: 1/8/2019    Time of Call: 3:45 PM     Diagnosis:  Diastolic heart failure     [ VORB ] Ordering provider: ARTEM Lui  Order: STOP Amlodipine. START Hydralazine 10 mg TID. Hold for SBP <100.      Order received by: Lizett Zapata RN      Follow-up/additional notes:       Lizett Zapata RN

## 2019-01-08 NOTE — TELEPHONE ENCOUNTER
Patient called to report that Blood pressure is up high again this morning. Patient stated when I got up this morning at around 7:15 BP was 143/107, and I recheck it again at around 12:30 PM  after taking my morning medication it went up to 183/103.    Patient reported some Shortness of breath and dizziness.  Message relayed to [facility] team for advice. Awaiting response.    Marion Gutierrez RN

## 2019-01-11 ENCOUNTER — TELEPHONE (OUTPATIENT)
Dept: CARDIOLOGY | Facility: CLINIC | Age: 84
End: 2019-01-11

## 2019-01-11 NOTE — TELEPHONE ENCOUNTER
Health Call Center    Phone Message    May a detailed message be left on voicemail: yes    Reason for Call: Other: Pt's son calling to schedule a telephone appt with Priscilla Agrawal. He said that he has been waiting for someone to call him back about setting up this appt. He wants to be able to have this appt over the phone instead of coming into the clinic if possible. Please give Arjun a call back to discuss/schedule.     Action Taken: Message routed to:  [facility] Cardiology

## 2019-01-11 NOTE — TELEPHONE ENCOUNTER
Health Call Center    Phone Message    May a detailed message be left on voicemail: yes    Reason for Call: Other: Arjun (son) would like to schedule the telephone appointment for Wednesday 1.16.19 morning.  Please call Arjun back with confirmation of appointment.     Action Taken: Message routed to:  [facility]): [facility] cardiology

## 2019-01-14 NOTE — TELEPHONE ENCOUNTER
[facility]    Phone Message    May a detailed message be left on voicemail: yes    Reason for Call: Other: Arjun called back to follow up regarding getting this phone appointment set up for Linda. Please give him a call back.     Action Taken: Message routed to:  [facility] Cardiology

## 2019-01-15 ENCOUNTER — OFFICE VISIT (OUTPATIENT)
Dept: RHEUMATOLOGY | Facility: CLINIC | Age: 84
End: 2019-01-15
Payer: MEDICARE

## 2019-01-15 ENCOUNTER — HOSPITAL ENCOUNTER (INPATIENT)
Facility: CLINIC | Age: 84
LOS: 5 days | Discharge: NURSING, REHAB OR LONG-TERM CARE | DRG: 291 | End: 2019-01-20
Attending: EMERGENCY MEDICINE | Admitting: INTERNAL MEDICINE
Payer: MEDICARE

## 2019-01-15 ENCOUNTER — APPOINTMENT (OUTPATIENT)
Dept: GENERAL RADIOLOGY | Facility: CLINIC | Age: 84
DRG: 291 | End: 2019-01-15
Payer: MEDICARE

## 2019-01-15 VITALS
WEIGHT: 163 LBS | RESPIRATION RATE: 34 BRPM | OXYGEN SATURATION: 96 % | SYSTOLIC BLOOD PRESSURE: 126 MMHG | HEIGHT: 64 IN | DIASTOLIC BLOOD PRESSURE: 63 MMHG | HEART RATE: 76 BPM | BODY MASS INDEX: 27.83 KG/M2

## 2019-01-15 DIAGNOSIS — M81.0 AGE-RELATED OSTEOPOROSIS WITHOUT CURRENT PATHOLOGICAL FRACTURE: Primary | ICD-10-CM

## 2019-01-15 DIAGNOSIS — N30.00 ACUTE CYSTITIS WITHOUT HEMATURIA: ICD-10-CM

## 2019-01-15 DIAGNOSIS — I50.30 HEART FAILURE WITH PRESERVED EJECTION FRACTION, NYHA CLASS I (H): ICD-10-CM

## 2019-01-15 DIAGNOSIS — M05.9 SEROPOSITIVE RHEUMATOID ARTHRITIS (H): Primary | ICD-10-CM

## 2019-01-15 DIAGNOSIS — J30.89 SEASONAL ALLERGIC RHINITIS DUE TO OTHER ALLERGIC TRIGGER: ICD-10-CM

## 2019-01-15 DIAGNOSIS — R07.9 ACUTE CHEST PAIN: ICD-10-CM

## 2019-01-15 DIAGNOSIS — H04.123 DRY EYES: ICD-10-CM

## 2019-01-15 DIAGNOSIS — M05.79 RHEUMATOID ARTHRITIS INVOLVING MULTIPLE SITES WITH POSITIVE RHEUMATOID FACTOR (H): ICD-10-CM

## 2019-01-15 DIAGNOSIS — E78.5 HYPERLIPIDEMIA LDL GOAL <100: ICD-10-CM

## 2019-01-15 DIAGNOSIS — E03.9 HYPOTHYROIDISM, UNSPECIFIED TYPE: ICD-10-CM

## 2019-01-15 DIAGNOSIS — R06.82 TACHYPNEA: ICD-10-CM

## 2019-01-15 DIAGNOSIS — N18.30 CKD (CHRONIC KIDNEY DISEASE) STAGE 3, GFR 30-59 ML/MIN (H): ICD-10-CM

## 2019-01-15 DIAGNOSIS — K21.9 GASTROESOPHAGEAL REFLUX DISEASE WITHOUT ESOPHAGITIS: ICD-10-CM

## 2019-01-15 DIAGNOSIS — N39.0 RECURRENT UTI: ICD-10-CM

## 2019-01-15 DIAGNOSIS — J84.9 ILD (INTERSTITIAL LUNG DISEASE) (H): ICD-10-CM

## 2019-01-15 DIAGNOSIS — I50.9 ACUTE ON CHRONIC CONGESTIVE HEART FAILURE, UNSPECIFIED HEART FAILURE TYPE (H): ICD-10-CM

## 2019-01-15 DIAGNOSIS — E56.9 VITAMIN DEFICIENCY: ICD-10-CM

## 2019-01-15 DIAGNOSIS — I50.30 (HFPEF) HEART FAILURE WITH PRESERVED EJECTION FRACTION (H): ICD-10-CM

## 2019-01-15 DIAGNOSIS — M05.9 SEROPOSITIVE RHEUMATOID ARTHRITIS (H): ICD-10-CM

## 2019-01-15 DIAGNOSIS — K12.0 ORAL APHTHAE: ICD-10-CM

## 2019-01-15 DIAGNOSIS — R06.02 SOB (SHORTNESS OF BREATH): ICD-10-CM

## 2019-01-15 DIAGNOSIS — I50.30 HEART FAILURE WITH PRESERVED EJECTION FRACTION (H): ICD-10-CM

## 2019-01-15 DIAGNOSIS — H35.30 MACULAR DEGENERATION OF LEFT EYE, UNSPECIFIED TYPE: Chronic | ICD-10-CM

## 2019-01-15 DIAGNOSIS — I10 HYPERTENSION GOAL BP (BLOOD PRESSURE) < 150/90: ICD-10-CM

## 2019-01-15 DIAGNOSIS — I10 BENIGN ESSENTIAL HYPERTENSION: ICD-10-CM

## 2019-01-15 DIAGNOSIS — Z79.899 HIGH RISK MEDICATION USE: ICD-10-CM

## 2019-01-15 DIAGNOSIS — I48.92 ATRIAL FLUTTER, PAROXYSMAL (H): ICD-10-CM

## 2019-01-15 DIAGNOSIS — K58.9 IRRITABLE BOWEL SYNDROME, UNSPECIFIED TYPE: ICD-10-CM

## 2019-01-15 LAB
ALBUMIN SERPL-MCNC: 3.8 G/DL (ref 3.4–5)
ALP SERPL-CCNC: 59 U/L (ref 40–150)
ALT SERPL W P-5'-P-CCNC: 18 U/L (ref 0–50)
ANION GAP SERPL CALCULATED.3IONS-SCNC: 8 MMOL/L (ref 3–14)
AST SERPL W P-5'-P-CCNC: 22 U/L (ref 0–45)
BASE EXCESS BLDV CALC-SCNC: 4.1 MMOL/L
BASOPHILS # BLD AUTO: 0 10E9/L (ref 0–0.2)
BASOPHILS NFR BLD AUTO: 0.4 %
BILIRUB SERPL-MCNC: 0.4 MG/DL (ref 0.2–1.3)
BUN SERPL-MCNC: 33 MG/DL (ref 7–30)
CALCIUM SERPL-MCNC: 9.2 MG/DL (ref 8.5–10.1)
CHLORIDE SERPL-SCNC: 100 MMOL/L (ref 94–109)
CO2 SERPL-SCNC: 28 MMOL/L (ref 20–32)
CREAT SERPL-MCNC: 1.51 MG/DL (ref 0.52–1.04)
DIFFERENTIAL METHOD BLD: ABNORMAL
EOSINOPHIL # BLD AUTO: 0.1 10E9/L (ref 0–0.7)
EOSINOPHIL NFR BLD AUTO: 1.6 %
ERYTHROCYTE [DISTWIDTH] IN BLOOD BY AUTOMATED COUNT: 13 % (ref 10–15)
GFR SERPL CREATININE-BSD FRML MDRD: 31 ML/MIN/{1.73_M2}
GLUCOSE SERPL-MCNC: 90 MG/DL (ref 70–99)
HCO3 BLDV-SCNC: 30 MMOL/L (ref 21–28)
HCT VFR BLD AUTO: 39.1 % (ref 35–47)
HGB BLD-MCNC: 13 G/DL (ref 11.7–15.7)
IMM GRANULOCYTES # BLD: 0 10E9/L (ref 0–0.4)
IMM GRANULOCYTES NFR BLD: 0.1 %
LYMPHOCYTES # BLD AUTO: 1.6 10E9/L (ref 0.8–5.3)
LYMPHOCYTES NFR BLD AUTO: 23.4 %
MCH RBC QN AUTO: 33.5 PG (ref 26.5–33)
MCHC RBC AUTO-ENTMCNC: 33.2 G/DL (ref 31.5–36.5)
MCV RBC AUTO: 101 FL (ref 78–100)
MONOCYTES # BLD AUTO: 0.6 10E9/L (ref 0–1.3)
MONOCYTES NFR BLD AUTO: 9.4 %
NEUTROPHILS # BLD AUTO: 4.4 10E9/L (ref 1.6–8.3)
NEUTROPHILS NFR BLD AUTO: 65.1 %
NRBC # BLD AUTO: 0 10*3/UL
NRBC BLD AUTO-RTO: 0 /100
NT-PROBNP SERPL-MCNC: 2153 PG/ML (ref 0–1800)
O2/TOTAL GAS SETTING VFR VENT: ABNORMAL %
PCO2 BLDV: 50 MM HG (ref 40–50)
PH BLDV: 7.39 PH (ref 7.32–7.43)
PLATELET # BLD AUTO: 283 10E9/L (ref 150–450)
PO2 BLDV: 30 MM HG (ref 25–47)
POTASSIUM SERPL-SCNC: 3.7 MMOL/L (ref 3.4–5.3)
PROT SERPL-MCNC: 7.8 G/DL (ref 6.8–8.8)
RBC # BLD AUTO: 3.88 10E12/L (ref 3.8–5.2)
SODIUM SERPL-SCNC: 136 MMOL/L (ref 133–144)
TROPONIN I SERPL-MCNC: 0.01 UG/L (ref 0–0.04)
WBC # BLD AUTO: 6.7 10E9/L (ref 4–11)

## 2019-01-15 PROCEDURE — 93005 ELECTROCARDIOGRAM TRACING: CPT | Performed by: EMERGENCY MEDICINE

## 2019-01-15 PROCEDURE — 84484 ASSAY OF TROPONIN QUANT: CPT | Performed by: EMERGENCY MEDICINE

## 2019-01-15 PROCEDURE — 71046 X-RAY EXAM CHEST 2 VIEWS: CPT

## 2019-01-15 PROCEDURE — 96374 THER/PROPH/DIAG INJ IV PUSH: CPT | Performed by: EMERGENCY MEDICINE

## 2019-01-15 PROCEDURE — 25000128 H RX IP 250 OP 636: Performed by: EMERGENCY MEDICINE

## 2019-01-15 PROCEDURE — 99214 OFFICE O/P EST MOD 30 MIN: CPT | Performed by: INTERNAL MEDICINE

## 2019-01-15 PROCEDURE — 12000001 ZZH R&B MED SURG/OB UMMC

## 2019-01-15 PROCEDURE — 82803 BLOOD GASES ANY COMBINATION: CPT | Performed by: EMERGENCY MEDICINE

## 2019-01-15 PROCEDURE — 99285 EMERGENCY DEPT VISIT HI MDM: CPT | Mod: Z6 | Performed by: EMERGENCY MEDICINE

## 2019-01-15 PROCEDURE — 80053 COMPREHEN METABOLIC PANEL: CPT | Performed by: EMERGENCY MEDICINE

## 2019-01-15 PROCEDURE — 83880 ASSAY OF NATRIURETIC PEPTIDE: CPT | Performed by: EMERGENCY MEDICINE

## 2019-01-15 PROCEDURE — 85025 COMPLETE CBC W/AUTO DIFF WBC: CPT | Performed by: EMERGENCY MEDICINE

## 2019-01-15 PROCEDURE — 99285 EMERGENCY DEPT VISIT HI MDM: CPT | Mod: 25 | Performed by: EMERGENCY MEDICINE

## 2019-01-15 RX ORDER — CETIRIZINE HYDROCHLORIDE 10 MG/1
10 TABLET ORAL AT BEDTIME
Status: DISCONTINUED | OUTPATIENT
Start: 2019-01-15 | End: 2019-01-20 | Stop reason: HOSPADM

## 2019-01-15 RX ORDER — CARBOXYMETHYLCELLULOSE SODIUM 10 MG/ML
1 GEL OPHTHALMIC DAILY PRN
Status: DISCONTINUED | OUTPATIENT
Start: 2019-01-15 | End: 2019-01-20 | Stop reason: HOSPADM

## 2019-01-15 RX ORDER — ASCORBIC ACID 500 MG
500 TABLET ORAL DAILY
Status: DISCONTINUED | OUTPATIENT
Start: 2019-01-16 | End: 2019-01-20 | Stop reason: HOSPADM

## 2019-01-15 RX ORDER — LEVOTHYROXINE SODIUM 75 UG/1
75 TABLET ORAL DAILY
Status: DISCONTINUED | OUTPATIENT
Start: 2019-01-16 | End: 2019-01-20 | Stop reason: HOSPADM

## 2019-01-15 RX ORDER — CARVEDILOL 3.12 MG/1
3.12 TABLET ORAL 2 TIMES DAILY WITH MEALS
Status: DISCONTINUED | OUTPATIENT
Start: 2019-01-16 | End: 2019-01-20 | Stop reason: HOSPADM

## 2019-01-15 RX ORDER — TRIMETHOPRIM 100 MG/1
50 TABLET ORAL DAILY
Status: DISCONTINUED | OUTPATIENT
Start: 2019-01-16 | End: 2019-01-16

## 2019-01-15 RX ORDER — AMOXICILLIN 250 MG
1 CAPSULE ORAL DAILY PRN
Status: DISCONTINUED | OUTPATIENT
Start: 2019-01-15 | End: 2019-01-18

## 2019-01-15 RX ORDER — FUROSEMIDE 10 MG/ML
40 INJECTION INTRAMUSCULAR; INTRAVENOUS ONCE
Status: COMPLETED | OUTPATIENT
Start: 2019-01-15 | End: 2019-01-15

## 2019-01-15 RX ORDER — AZATHIOPRINE 50 MG/1
50 TABLET ORAL DAILY
Status: DISCONTINUED | OUTPATIENT
Start: 2019-01-16 | End: 2019-01-20 | Stop reason: HOSPADM

## 2019-01-15 RX ORDER — FOLIC ACID 1 MG/1
1 TABLET ORAL DAILY
Status: DISCONTINUED | OUTPATIENT
Start: 2019-01-16 | End: 2019-01-20 | Stop reason: HOSPADM

## 2019-01-15 RX ORDER — ALBUTEROL SULFATE 0.83 MG/ML
2.5 SOLUTION RESPIRATORY (INHALATION) EVERY 6 HOURS PRN
Status: DISCONTINUED | OUTPATIENT
Start: 2019-01-15 | End: 2019-01-20 | Stop reason: HOSPADM

## 2019-01-15 RX ORDER — HYDRALAZINE HYDROCHLORIDE 10 MG/1
10 TABLET, FILM COATED ORAL 3 TIMES DAILY
Status: DISCONTINUED | OUTPATIENT
Start: 2019-01-16 | End: 2019-01-18

## 2019-01-15 RX ORDER — ACETAMINOPHEN 500 MG
500 TABLET ORAL EVERY 6 HOURS PRN
Status: DISCONTINUED | OUTPATIENT
Start: 2019-01-15 | End: 2019-01-20 | Stop reason: HOSPADM

## 2019-01-15 RX ADMIN — FUROSEMIDE 40 MG: 10 INJECTION, SOLUTION INTRAVENOUS at 22:19

## 2019-01-15 ASSESSMENT — MIFFLIN-ST. JEOR
SCORE: 1141.22
SCORE: 1121
SCORE: 1151.42

## 2019-01-15 ASSESSMENT — ENCOUNTER SYMPTOMS
COUGH: 0
SHORTNESS OF BREATH: 1
UNEXPECTED WEIGHT CHANGE: 1
ACTIVITY CHANGE: 1

## 2019-01-15 ASSESSMENT — PAIN DESCRIPTION - DESCRIPTORS: DESCRIPTORS: PRESSURE

## 2019-01-15 NOTE — PROGRESS NOTES
HPI:    Ms. Rutledge is an 87-year-old female who was referred by Dr. Carrasquillo for evaluation of mitral regurgitation and for possible mitral clip placement.  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 she also has a history of pulmonary fibrosis secondary to rheumatoid arthritis, chronic kidney disease hypertension and hypothyroidism.    She has been having worsening shortness of breath over the last several months.  Denies for chest pain, syncope however had few episodes of lightheadedness.    Current Outpatient Medications   Medication     abatacept (ORENCIA) 250 MG injection     acetaminophen (TYLENOL) 500 MG tablet     albuterol (PROVENTIL) (2.5 MG/3ML) 0.083% neb solution     apixaban ANTICOAGULANT (ELIQUIS) 2.5 MG tablet     azaTHIOprine (IMURAN) 50 MG tablet     Blood Pressure Monitor KIT     calcium carbonate-vitamin D (OS-FATOUMATA) 500-400 MG-UNIT tablet     Carboxymethylcellulose Sod PF (CELLUVISC/REFRESH LIQUIGEL) 1 % ophthalmic gel     carvedilol (COREG) 3.125 MG tablet     cetirizine (ZYRTEC ALLERGY) 10 MG tablet     denosumab (PROLIA) 60 MG/ML SOLN injection     folic acid (FOLVITE) 1 MG tablet     furosemide (LASIX) 20 MG tablet     hydrALAZINE (APRESOLINE) 10 MG tablet     hypromellose (GENTEAL) 0.3 % SOLN ophthalmic solution     levothyroxine (SYNTHROID/LEVOTHROID) 75 MCG tablet     Multiple Vitamins-Minerals (PRESERVISION AREDS) CAPS     nitroGLYcerin (NITROSTAT) 0.4 MG sublingual tablet     order for DME     order for DME     order for DME     order for DME     ranibizumab (LUCENTIS) 0.3 MG/0.05ML SOLN     ranitidine (ZANTAC) 150 MG tablet     saccharomyces boulardii (FLORASTOR) 250 MG capsule     senna-docusate (SENOKOT-S/PERICOLACE) 8.6-50 MG tablet     trimethoprim (TRIMPEX) 100 MG tablet     vitamin C (ASCORBIC ACID) 500 MG tablet     No current facility-administered medications for this visit.   "    Review Of Systems  Skin: negative  Eyes: negative  Ears/Nose/Throat: negative  Respiratory: No shortness of breath, dyspnea on exertion, cough, or hemoptysis and Shortness of breath  Cardiovascular: positive forSOB  Gastrointestinal: negative  Genitourinary: negative  Neurologic: negative  P/E:    /81 (BP Location: Right arm, Patient Position: Chair, Cuff Size: Adult Regular)   Pulse 70   Ht 1.613 m (5' 3.5\")   Wt 73.5 kg (162 lb)   LMP  (LMP Unknown)   SpO2 97%   BMI 28.25 kg/m      Exam:  Constitutional: healthy, alert and no distress  Head: Normocephalic. No masses, lesions, tenderness or abnormalities  Neck: Neck supple. No adenopathy. Thyroid symmetric, normal size,, Carotids without bruits.  ENT: ENT exam normal, no neck nodes or sinus tenderness  Cardiovascular: negative, PMI normal. No lifts, heaves, or thrills. RRR. SM murmur present, clicks gallops or rub  Respiratory: negative, Percussion normal. Good diaphragmatic excursion. Lungs clear  Gastrointestinal: Abdomen soft, non-tender. BS normal. No masses, organomegaly  : Deferred      YOLANDA:    Interpretation Summary  The left atrial appendage is normal. It is free of spontaneous echo contrast  and thrombus.  No transgastric images obtained due to patient discomfort.     The Ejection Fraction is estimated at 55-60%.  Global right ventricular function is normal.  Moderate to severe mitral insufficiency is present.  Moderate tricuspid insufficiency is present.  Multiple MR jets noted. There is blunted systolic forward flow in 2 of the  pulmonary veins. MR volume is estimated at 34ml, however this likely  underestimates MR due to multiple jets.     Compared to prior TTE on 10/9/2018, MR appears to be worse.  _____________________________________________________________________________  __        Procedure  Transesophageal Echocardiogram with color and spectral Doppler performed. 3D  image acquisition, reconstruction, and real-time " interpretation was performed.  Procedure location Echo Lab. Informed consent for Transesophegeal echo  obtained. YOLANDA Probe #51 was used during the procedure. Patient was sedated  using Fentanyl 25 mcg. Patient was sedated using Versed 1 mg. I determined  this patient to be an appropriate candidate for the planned sedation and  procedure and have reassessed the patient immediately prior to sedation and  procedure. Total sedation time: 22 minutes of continuous bedside 1:1  monitoring. The Transducer was inserted without difficulty . The patient  tolerated the procedure well. Complications None. The patient's rhythm is  atrial fibrillation. Good quality two-dimensional was performed and  interpreted.     Left Ventricle  The Ejection Fraction is estimated at 55-60%. No regional wall motion  abnormalities are seen. Left ventricular function, chamber size, wall motion,  and wall thickness are normal.The EF is 55-60%.     Right Ventricle  The right ventricle is normal size. Global right ventricular function is  normal. A pacemaker lead is noted in the right ventricle.     Atria  The left atrial appendage is normal. It is free of spontaneous echo contrast  and thrombus. Moderate left atrial enlargement is present. Mild right atrial  enlargement is present. The atrial septum is intact as assessed by color  Doppler . A pacemaker lead is noted in the right atrium.        Mitral Valve  Moderate to severe mitral insufficiency is present. Multiple MR jets noted.  There is blunted systolic forward flow in 2 of the pulmonary veins. MR volume  is estimated at 34ml, however this likely underestimates MR due to multiple  jets.     Aortic Valve  The aortic valve is tricuspid. Trace to mild aortic insufficiency is present.     Tricuspid Valve  The tricuspid valve is normal. Moderate tricuspid insufficiency is present.  Right ventricular systolic pressure is 31mmHg above the right atrial pressure.     Pulmonic Valve  The pulmonic valve  is normal. Trace pulmonic insufficiency is present.     Vessels  Moderate to severe focal plaque noted in the descending aorta and arch. The  aorta root is normal. Normal pulmonary arteries.        Pericardium  No pericardial effusion is present.      Assessment and plan #1 severe mitral regurgitation:  Reviewed the transthoracic echocardiogram and transesophageal echocardiogram. she would be a candidate for mitral clip placement.  Risk benefits and alternatives of mitral clip placement was discussed with the patient patient has been to proceed at this time.  The meantime I will increase her afterload reduction and continue to manage her fluid status.         Avila Watson M.D.  Interventional Cardiology  [facility]

## 2019-01-15 NOTE — ED TRIAGE NOTES
Pt. Presents to ED from clinic for SOB, chest pain, back pain that has been increasing for several days. Pt. A & O x 4, SBA with walker. Pt. Reports that she feels weak.

## 2019-01-15 NOTE — LETTER
Health Information Management Services               Recipient:  ATTN Admissions, seeking placement asap        Sender:  Khushbu Dangelo ALKASW, MSW  5B  (Medical/Surgical)  Phone: [phone]  Pager: [phone]         Date: January 18, 2019  Patient Name:  Linda Spicer  Routing Message:            The documents accompanying this notice contain confidential information belonging to the sender.  This information is intended only for the use of the individual or entity named above.  The authorized recipient of this information is prohibited from disclosing this information to any other party and is required to destroy the information after its stated need has been fulfilled, unless otherwise required by state law.      If you are not the intended recipient, you are hereby notified that any disclosure, copying, distribution or action taken in reliance on the contents of these documents is strictly prohibited. If you have received this document in error, please notify [place] immediately at [phone].  You may return the document via fax ([phone]) or return mail  (Health Information Management, , [address] [place] [address]).

## 2019-01-15 NOTE — LETTER
Transition Communication Hand-off for Care Transitions to Next Level of Care Provider    Name: Linda Spicer  : 1931  MRN #: [id number]  Primary Care Provider: Tre Lockett     Primary Clinic: [facility] [address]  [facility] [address]     Reason for Hospitalization:  ILD (interstitial lung disease) (H) [J84.9]  Acute on chronic congestive heart failure, unspecified heart failure type (H) [I50.9]  Admit Date/Time: 1/15/2019  5:16 PM  Discharge Date: 19  Payor Source: Payor: MEDICARE / Plan: MEDICARE / Product Type: Medicare /     Readmission Assessment Measure (ZAHIRA) Risk Score/category: high    Plan of Care Goals/Milestone Events:   Patient Concerns: Linda Spicer is a 87 year old female with PMH of HFpEF, PAF/Aflutter on apixaban s/p multiple cardioversions, tachy lisa s/p PPM , rheumatoid pulmonary fibrosis, CKD, hypothyroidism, who presents with 1 day of L-sided chest discomfort and chronic SOB w/ exertion.    Patient Goals:   Short-term rehab   Long-term    Medical Goals   Short-term PCP follow up   Long-term CARDS follow up         Reason for Communication Hand-off Referral: Fragility  Difficulty understanding plan of care  Multiple providers/specialties    Discharge Plan:       Concern for non-adherence with plan of care:   Y/N no  Discharge Needs Assessment:  Needs      Most Recent Value   Equipment Currently Used at Home  walker, rolling, walker, standard, wheelchair, manual, shower chair, grab bar, tub/shower   Home Care  -- [[facility] (Ph: [phone] Fax: [phone]) ]          Already enrolled in Tele-monitoring program and name of program:    Follow-up specialty is recommended: Yes    Follow-up plan:    Future Appointments   Date Time Provider Department Center   2019  6:00 AM Sharonda High OT [facility]   2019 10:00 AM [address] INFUSION [facility] [place]   2019  4:00 PM DEVICE CHECK RN CARD [facility]   2/15/2019 10:00 AM Tenisha  [facility]   3/1/2019 10:00 AM Tenisha [facility]   3/19/2019  9:30 AM Minna Gallagher APRN CNP [facility] P [facility]   3/20/2019 12:00 AM UC ICD REMOTE [facility]   7/16/2019 10:00 AM Mario Davenport MD [facility]       Any outstanding tests or procedures:        Referrals     Future Labs/Procedures    Home care nursing referral     Comments:    [facility]  Phone: [phone]   Fax: [phone]    For RN eval post hospitalization.   Assess: vital signs, respiratory and cardiac status, activity tolerance, hydration, nutritional status.   Med set up and management.   Heart failure education reinforcement.  HHA for assist with ADL's.   PT/OT eval and treat for deconditioning, strengthening, and endurance.    Your provider has ordered home care nursing services. If you have not been contacted within 2 days of your discharge please call the inpatient department phone number at [phone] .    Medication Therapy Management Referral     Comments:    MTM referral reason            Patient had a hospital or ED visit in last 6 months and has more than 10   PTA or Discharge medications    Patient has 5 PTA or Discharge Medications AND one of the following   diagnoses: DM,HF,COPD,AMI DX,PULM HTN       This service is designed to help you get the most from your medications.  A specially trained pharmacist will work closely with you and your doctors  to solve any problems related to your medications and to help you get the   best results from taking them.      The Medication Therapy Management staff will call you to schedule an appointment.            Key Recommendations:      Khushbu Dangelo    AVS/Discharge Summary is the source of truth; this is a helpful guide for improved communication of patient story

## 2019-01-15 NOTE — PROGRESS NOTES
"Rheumatology Clinic Visit      Linda Spicer MRN# [id number]   YOB: 1931 Age: 87 year old      Date of visit: 1/15/19   PCP: Dr. Tre Lockett  Pulmonology: Dr. Sandra Comer  Cardiology: Dr. Emeterio Loza  Dermatology: Dr. Andrews Knott at [facility] in [place]     Chief Complaint   Patient presents with:  Arthritis: RA, patient states she is doing pretty good.    Assessment and Plan     1. Seropositive Nonerosive Rheumatoid Arthritis (RF 99, CCP 52): Previously treated with hydroxychloroquine 200 mg daily (stopped previously because of macular degeneration), methotrexate (leg cramps), Cimzia (stopped due to recurrent basal cell carcinoma and hx of heart failure). Has sulfa allergy. Leflunomide avoided because of ILD.  Currently on Orencia IV and azathioprine 50mg daily. Doing well today with regard to RA and therefore will maintain the current medication regimen.  TPMT 23.2 (normal 24-44) and considered \"intermediate activity\".  - Continue orencia 750mg IV every 4 weeks, administered  at the [facility]  - Continue azathioprine 50mg daily  - Labs every 3 months to be done with her Orencia infusions (every third infusion): CBC, Creatinine, Hepatic Panel    2. Bilateral shoulder impingement syndrome, history: Maintaining ROM with exercises at home. She was previously referred to PT but did not go. Not an issue today.     3. History of basal cell carcinoma: Reportedly with lesions removed in 2007, fall 2016 and fall 2017.    4. Heart failure: She is followed by cardiology.  Has a pacemaker, per patient.     5. 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  2013 chest CT with contrast performed at [facility] documents UIP pattern. Follows with Dr. Comer. 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      6. Bone Health: Managed by PCP / endocrinology.     7. Tachypnea: RR of 34, " rechecked and was 36.  She cannot speak more than a couple words without interrupting her sentence for a breath.  HR and BP are okay.  Advised that she needs to be seen in the ED today. Called the cardiology team at Ms. Spicer' request and spoke with Ari Sanz (spelling?) who advised [facility].  Called and spoke with Dr. Hughes (spelling?) at the [facility] ED to give report. Patient having her daughter drive her to the ED now.     Ms. Spicer verbalized agreement with and understanding of the rational for the diagnosis and treatment plan.  All questions were answered to best of my ability and the patient's satisfaction. Ms. Spicer was advised to contact the clinic with any questions that may arise after the clinic visit.      Thank you for involving me in the care of the patient    Return to clinic: 6 months      HPI   Linda Spicer is a 87 year old female with a medical history significant for hypertension, heart failure, pulmonary fibrosis, audible bowel syndrome, degenerative disc disease of the lumbar spine status post laminectomy, chronic kidney disease, history of basal cell carcinoma, and rheumatoid arthritis who presents for follow-up of rheumatoid arthritis.    Today, she reports that she is doing well with regard to RA.  Mild right shoulder and left elbow pain that she says is due to her leaning on things and propping up things since she has just been sitting since she has zero exercise tolerance.  Since last seen, her   and she has been having worsening cardiac issues.  Today she says that she feels awful; no energy and shortness of breath. Denies chest pain, nausea, or diaphoresis. She says that she is scheduled to have a cardiac procedure done at the [facility] in February and reports being worse off when seen by cardiology last.      Denies fevers, chills, nausea, vomiting. No abdominal pain. No chest pain/pressure, palpitations.  Chronic SOB. No oral or nasal sores. No neck pain.    No photosensitivity or photophobia.   No eye pain or redness.     Tobacco: None  EtOH: rare  Drugs: none  Used to live in [place], [place] part time.    ROS   GEN: No fevers, chills, night sweats, or weight change  SKIN: See HPI  HEENT: No epistaxis. No oral or nasal ulcers.  CV: See HPI  PULM: See HPI  GI: No nausea, vomiting. No blood in stool. No abdominal pain.  : No blood in urine.  MSK: See HPI.  EXT: No LE swelling  PSYCH: Negative    Active Problem List     Patient Active Problem List   Diagnosis     Hypertension goal BP (blood pressure) < 150/90     Hypothyroidism     Macular degeneration, left eye     Irritable bowel syndrome     Encounter for palliative care     Adjustment disorder with anxious mood     Mild anemia     DDD (degenerative disc disease), lumbar     CKD (chronic kidney disease) stage 3, GFR 30-59 ml/min (H)     History of blood transfusion     Aftercare following surgery     S/P lumbar laminectomy     High risk medication use     Age-related osteoporosis without current pathological fracture     Atrophic vaginitis     Fecal incontinence     Female stress incontinence     Impingement syndrome of both shoulders     UIP (usual interstitial pneumonitis) (H)     High risk medications (not anticoagulants) long-term use     Heart failure with preserved ejection fraction (H)     Congestive heart failure with preserved LV function, NYHA class 3 (H)     Other specified hypothyroidism     Rheumatoid arthritis involving multiple sites with positive rheumatoid factor (H)     Health Care Home     Sick sinus syndrome (H)     Cardiac pacemaker - Medtronic dual lead pacemaker - Not Dependent - MRI Safe     Immunosuppression (H)     ILD (interstitial lung disease) (H)     Heart failure with preserved ejection fraction, NYHA class I (H)     Atrial flutter (H)     Paroxysmal atrial fibrillation (H)     CHF exacerbation (H)     Pre-syncope     (HFpEF) heart failure with preserved ejection fraction (H)     Mitral  regurgitation     Past Medical History     Past Medical History:   Diagnosis Date     Adjustment disorder with anxious mood 5/18/2015     Advanced directives, counseling/discussion 8/30/2012    Patient states has Advance Directive and will bring in a copy to clinic. 8/30/2012   [facility] Medical Assistant \       Anemia 9/25/2015     Basal cell cancer      CHF (congestive heart failure) (H) 9/18/2014     CKD (chronic kidney disease) stage 3, GFR 30-59 ml/min (H) 9/29/2015     DDD (degenerative disc disease), lumbar 9/25/2015     Diffuse idiopathic pulmonary fibrosis (H) 5/6/2013     Encounter for palliative care 5/18/2015     History of blood transfusion 9/29/2015     Hypertension goal BP (blood pressure) < 140/90 9/7/2012     Hypothyroid 9/7/2012     Irritable bowel syndrome 10/29/2013     Macular degeneration      Macular degeneration, 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     Nondisplaced spiral fracture of shaft of humerus      Osteoporosis 8/13/2013     Imo Update utility     RA (rheumatoid arthritis) (H) 5/7/2013     Rheumatic fever      Shingles      Spinal stenosis of lumbar region with neurogenic claudication 9/14/2015     Past Surgical History     Past Surgical History:   Procedure Laterality Date     ANESTHESIA CARDIOVERSION N/A 9/14/2018    Procedure: ANESTHESIA CARDIOVERSION;;  Surgeon: GENERIC ANESTHESIA PROVIDER;  Location: [facility] OR     ANESTHESIA CARDIOVERSION N/A 10/11/2018    Procedure: ANESTHESIA CARDIOVERSION;  Cardioversion;  Surgeon: GENERIC ANESTHESIA PROVIDER;  Location: [facility] OR     ANESTHESIA CARDIOVERSION N/A 10/16/2018    Procedure: Anesthesia Cardioversion ;  Surgeon: GENERIC ANESTHESIA PROVIDER;  Location: [facility] OR     APPENDECTOMY       BIOPSY      hemorrhoidectomy     ENT SURGERY      tonsillectomy     GYN SURGERY      3 D & C's     HYSTERECTOMY, PAP NO LONGER INDICATED       LAMINECTOMY LUMBAR ONE LEVEL N/A 10/13/2015    Procedure: LAMINECTOMY LUMBAR ONE LEVEL;  Surgeon: Fransico Toussaint MD;   Location: [facility] OR     Allergy     Allergies   Allergen Reactions     Cephalexin Hcl Diarrhea     Gabapentin Other (See Comments)     Dizzsiness     Naproxen GI Disturbance     Perfume      Macrobid [Nitrofurantoin Anhydrous]      Possibly related to lung disease      Seasonal Allergies      Sulfa Drugs      Throat swelling     Xanax [Alprazolam] Other (See Comments)     Dizziness      Ciprofloxacin Itching and Rash     Current Medication List     Current Outpatient Medications   Medication Sig     abatacept (ORENCIA) 250 MG injection Inject 750 mg into the vein every 28 days     acetaminophen (TYLENOL) 500 MG tablet Take 1 tablet (500 mg) by mouth every 6 hours as needed for mild pain or fever     albuterol (PROVENTIL) (2.5 MG/3ML) 0.083% neb solution Take 1 vial (2.5 mg) by nebulization every 6 hours as needed for shortness of breath / dyspnea or wheezing     apixaban ANTICOAGULANT (ELIQUIS) 2.5 MG tablet Take 1 tablet (2.5 mg) by mouth 2 times daily     azaTHIOprine (IMURAN) 50 MG tablet Take 1 tablet (50 mg) by mouth daily     calcium carbonate-vitamin D (OS-FATOUMATA) 500-400 MG-UNIT tablet Take 1 tablet by mouth daily     Carboxymethylcellulose Sod PF (CELLUVISC/REFRESH LIQUIGEL) 1 % ophthalmic gel Place 1 drop into both eyes daily as needed for dry eyes     carvedilol (COREG) 3.125 MG tablet Take 1 tablet (3.125 mg) by mouth 2 times daily (with meals)     cetirizine (ZYRTEC ALLERGY) 10 MG tablet Take 1 tablet (10 mg) by mouth At Bedtime     denosumab (PROLIA) 60 MG/ML SOLN injection Inject 1 mL (60 mg) Subcutaneous every 6 months     folic acid (FOLVITE) 1 MG tablet Take 1 tablet (1 mg) by mouth daily     furosemide (LASIX) 20 MG tablet Take 1 tablet (20 mg) by mouth 2 times daily Hold Lasix 12/18 and 12/19, then start 20 mg daily after that.     hydrALAZINE (APRESOLINE) 10 MG tablet Take 1 tablet (10 mg) by mouth 3 times daily     hypromellose (GENTEAL) 0.3 % SOLN ophthalmic solution Place 1 drop into both eyes daily  as needed for dry eyes     levothyroxine (SYNTHROID/LEVOTHROID) 75 MCG tablet Take 1 tablet (75 mcg) by mouth daily     Multiple Vitamins-Minerals (PRESERVISION AREDS) CAPS Take 1 capsule by mouth 2 times daily     nitroGLYcerin (NITROSTAT) 0.4 MG sublingual tablet Place 1 tablet (0.4 mg) under the tongue every 5 minutes as needed for chest pain     ranibizumab (LUCENTIS) 0.3 MG/0.05ML SOLN 0.05 mLs (0.3 mg) by Intravitreal route See Admin Instructions Every 6 weeks     ranitidine (ZANTAC) 150 MG tablet TAKE ONE TABLET BY MOUTH TWICE DAILY     saccharomyces boulardii (FLORASTOR) 250 MG capsule Take 1 capsule (250 mg) by mouth daily     senna-docusate (SENOKOT-S/PERICOLACE) 8.6-50 MG tablet Take 1 tablet by mouth daily as needed for constipation     trimethoprim (TRIMPEX) 100 MG tablet Take 0.5 tablets (50 mg) by mouth daily     vitamin C (ASCORBIC ACID) 500 MG tablet Take 1 tablet (500 mg) by mouth daily     Blood Pressure Monitor KIT 1 kit daily as needed     order for DME Dispense SP Walker-small     order for DME Equipment being ordered: Dispense baffle, for use with nebulizer.     order for DME Equipment being ordered: Nebulizer. Use with Albuterol.     order for DME Equipment being ordered: Dispense face mask.  Mrs. Spicer is immunosuppressed due to rheumatoid arthritis.     No current facility-administered medications for this visit.        Social History   See HPI    Family History     Family History   Problem Relation Age of Onset     Hypertension Mother      Psychotic Disorder Father      Diabetes Son      Diabetes Daughter      Blood Disease Daughter      Physical Exam     Temp Readings from Last 3 Encounters:   01/05/19 97.8  F (36.6  C) (Oral)   12/27/18 97.9  F (36.6  C) (Oral)   12/04/18 97.5  F (36.4  C) (Oral)     BP Readings from Last 5 Encounters:   01/15/19 126/63   01/05/19 188/81   01/03/19 119/72   12/27/18 133/77   12/20/18 177/81     Pulse Readings from Last 1 Encounters:   01/15/19 76  "    Resp Readings from Last 1 Encounters:   01/15/19 (!) 34     Estimated body mass index is 28.42 kg/m  as calculated from the following:    Height as of this encounter: 1.613 m (5' 3.5\").    Weight as of this encounter: 73.9 kg (163 lb).    GEN: NAD  HEENT: MMM. No oral lesions. Anicteric, noninjected sclera  CV: S1, S2. RRR. No m/r/g.  PULM: CTA bilaterally. No w/c. Cannot complete more than a couple words without stopping to take a breath.  Increase work of breathing.  MSK: Hands, wrists, elbows, and shoulders without swelling or tenderness to palpation. Hips nontender to palpation. Bilateral knees with mild medial joint line tenderness but no effusion or increased warmth; right knee also tender to palpation over the pes anserine bursa.  Ankles and MTPs without swelling or tenderness to palpation.  Negative MCP and MTP squeeze bilaterally.   NEURO: UE and LE strengths 5/5 and equal bilaterally.   SKIN: No rash  PSYCH: Alert. Appropriate.    Labs / Imaging (select studies)   RF/CCP  Recent Labs   Lab Test 04/05/16  1036   CCPIGG 52*   RHF 99*     CBC  Recent Labs   Lab Test 01/05/19  1610 12/03/18  0547 12/02/18  0658 12/01/18  0759  11/28/18  1558 11/17/18  1124 11/08/18  1010   WBC  --  5.9 6.2 7.4   < > 6.2 6.2 6.9   RBC  --  3.22* 3.23* 3.46*   < > 3.74* 3.75* 3.45*   HGB 11.6* 10.7* 10.6* 11.5*   < > 12.4 12.7 11.7   HCT  --  32.6* 32.9* 35.2   < > 38.3 38.6 35.2   MCV  --  101* 102* 102*   < > 102* 103* 102*   RDW  --  13.0 13.2 13.4   < > 13.6 13.3 13.3   PLT  --  232 237 239   < > 270 255 266   MCH  --  33.2* 32.8 33.2*   < > 33.2* 33.9* 33.9*   MCHC  --  32.8 32.2 32.7   < > 32.4 32.9 33.2   NEUTROPHIL  --   --   --   --   --  67.5 73.1 72.7   LYMPH  --   --   --   --   --  20.0 18.5 16.3   MONOCYTE  --   --   --   --   --  9.6 6.6 8.5   EOSINOPHIL  --   --   --   --   --  2.4 1.3 2.2   BASOPHIL  --   --   --   --   --  0.3 0.3 0.3   ANEU  --   --   --   --   --  4.2 4.6 5.0   ALYM  --   --   --   --   " --  1.3 1.2 1.1   YEHUDA  --   --   --   --   --  0.6 0.4 0.6   AEOS  --   --   --   --   --  0.2 0.1 0.2   ABAS  --   --   --   --   --  0.0 0.0 0.0    < > = values in this interval not displayed.     CMP  Recent Labs   Lab Test 01/05/19  1610 01/03/19  0934 12/26/18  0957 12/19/18  1541  12/02/18  0658  11/28/18  1558  11/17/18  1124    138 138 135   < > 134   < > 136   < > 134   POTASSIUM 3.9 4.0 4.0 3.9   < > 4.2   < > 3.8   < > 4.4   CHLORIDE  --  104 104 101   < > 103   < > 104   < > 98   CO2  --  26 28 27   < > 23   < > 26   < > 28   ANIONGAP  --  8 6 8   < > 8   < > 7   < > 8   GLC 81 96 94 95   < > 93   < > 110*   < > 191*   BUN  --  25 29 35*   < > 34*   < > 41*   < > 37*   CR  --  1.50* 1.43* 1.41*   < > 1.41*   < > 1.69*   < > 1.79*   GFRESTIMATED 36* 31* 33* 33*   < > 35*   < > 29*   < > 27*   GFRESTBLACK 43* 36* 38* 39*   < > 43*   < > 35*   < > 32*   FATOUMATA  --  7.9* 8.7 8.0*   < > 7.3*   < > 7.8*   < > 9.3   BILITOTAL  --   --   --   --   --  0.4  --  0.4  --  0.6   ALBUMIN  --   --   --   --   --  3.1*  --  3.5  --  3.4   PROTTOTAL  --   --   --   --   --  6.6*  --  7.5  --  7.4   ALKPHOS  --   --   --   --   --  57  --  65  --  60   AST  --   --   --   --   --  19  --  21  --  24   ALT  --   --   --   --   --  18  --  21  --  22    < > = values in this interval not displayed.     Calcium/VitaminD  Recent Labs   Lab Test 01/03/19  0934 12/26/18  0957 12/19/18  1541   FATOUMATA 7.9* 8.7 8.0*     ESR/CRP  Recent Labs   Lab Test 12/01/18  0759 05/02/18  1050 02/07/18  1008 11/10/17  0925   SED 28  --  34* 23   CRP <2.9 <2.9 3.7 <2.9     Lipid Panel  Recent Labs   Lab Test 08/09/18  0958 08/30/17  1214 06/03/16  0756 05/22/14  0821   CHOL 156 146 171 155   TRIG 51 101 75 84   HDL 59 50 47* 42*   LDL 87 76 109* 97   VLDL  --   --   --  17   CHOLHDLRATIO  --   --   --  3.7   NHDL 97 96 124  --      Hepatitis B  Recent Labs   Lab Test 04/05/16  1036   HBCAB Nonreactive   HEPBANG Nonreactive     Hepatitis  C  Recent Labs   Lab Test 04/05/16  1036   HCVAB Nonreactive   Assay performance characteristics have not been established for newborns, infants, and children       Tuberculosis Screening  Recent Labs   Lab Test 02/21/17  1148 04/05/16  1037   TBRSLT Negative Negative   TBAGN 0.19 0.03     HIV Screening  Recent Labs   Lab Test 04/05/16  1036   HIAGAB Nonreactive   HIV-1 p24 Ag & HIV-1/HIV-2 Ab Not Detected       Immunization History     Immunization History   Administered Date(s) Administered     Influenza (High Dose) 3 valent vaccine 09/07/2012, 10/08/2013, 09/26/2014, 09/30/2015, 09/21/2016, 10/13/2017, 09/27/2018     Influenza (IIV3) PF 08/29/2011     Mantoux Tuberculin Skin Test 12/04/2018     Pneumo Conj 13-V (2010&after) 04/05/2016     Pneumococcal 23 valent 05/01/2001, 10/04/2010     TD (ADULT, 7+) 07/01/2008     TDAP Vaccine (Boostrix) 09/11/2017          Chart documentation done in part with Dragon Voice recognition Software. Although reviewed after completion, some word and grammatical error may remain.    Mario Davenport MD

## 2019-01-15 NOTE — ED PROVIDER NOTES
[place] EMERGENCY DEPARTMENT ([facility])  1/15/19 ED 23 6:00 PM   History     Chief Complaint   Patient presents with     Shortness of Breath     Palpitations     The history is provided by the patient, medical records and a relative (son).     Linda Spicer is a 87 year old female who presents with chest pain, shortness of breath and increased work of breathing over the past few months and accelerating over past week. She has a complex past medical history of pulmonary fibrosis, CHF on lasix, paroxysmal atrial fibrillation status post pacemaker placement, leaky mitral valve, rheumatoid arthritis. Patient has had some shortness of breath that has been gradually worsening from her baseline. She has been declining since May 2018. She notes being seen in ED in December and has gotten even worse since then. She has shortness of breath with any movement which is worse than usual. She has had a few episodes of palpitations in the past week. She has left sided chest pain wrapping around to the back. She has had it last week, resolved and it has since recurred. The chest pain has been present all day today. She has increased work of breathing which is worse compared to her baseline. She notes having been started on lasix, had cardioversion last month. She states the effects of the cardioversion seem to be temporary and benefits just decreased. Patient accompanied by son who notes she had change of her lasix as well as her hydralazine. A few weeks ago she was on lasix 20 mg in morning and afternoon. Approximately 2 weeks ago her lasix was changed--if her ankles are edematous they instructed her to take an additional one in morning, so 40 mg morning and 20 mg evenings. Son notes she has had problems with her kidney function as well as problems with dehydration when taking lasix. Over the past few months she has noticed her pants have not been fitting well and she has increased edema in her ankles as well as  her abdomen. She has been checking daily weights, has been gradually going up.Her weight has been 154-159 lbs at home.       Today patient was at Rheumatology Clinic, was noted to be tachypneic with respiratory rate of 36 and unable to speak more than a couple of words before needing to take a breath.  She was sent to the ER for evaluation.  She has no history of cardiac stenting. She denies cough or cold symptoms. She is not on O2 at baseline, usually her O2 sats are around 98. No recent fevers. No rhinorrhea or sore throat. No vomiting or diarrhea. Patient lives by herself at [facility], ambulates with a walker.     Per Epic records her last echo was on 11/28/18. She has an EF of 55-60% with mild to moderate mitral valve insufficiency, mild to moderate tricuspid valve insufficiency.     I have reviewed the Medications, Allergies, Past Medical and Surgical History, and Social History in the Epic system.  .  PAST MEDICAL HISTORY:   Past Medical History:   Diagnosis Date     Adjustment disorder with anxious mood 5/18/2015     Advanced directives, counseling/discussion 8/30/2012    Patient states has Advance Directive and will bring in a copy to clinic. 8/30/2012   Tevin [facility] Medical Assistant \       Anemia 9/25/2015     Basal cell cancer      CHF (congestive heart failure) (H) 9/18/2014     CKD (chronic kidney disease) stage 3, GFR 30-59 ml/min (H) 9/29/2015     DDD (degenerative disc disease), lumbar 9/25/2015     Diffuse idiopathic pulmonary fibrosis (H) 5/6/2013     Encounter for palliative care 5/18/2015     History of blood transfusion 9/29/2015     Hypertension goal BP (blood pressure) < 140/90 9/7/2012     Hypothyroid 9/7/2012     Irritable bowel syndrome 10/29/2013     Macular degeneration      Macular degeneration, 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     Nondisplaced spiral fracture of shaft of humerus      Osteoporosis 8/13/2013     Imo Update utility     RA (rheumatoid arthritis) (H)  5/7/2013     Rheumatic fever      Shingles      Spinal stenosis of lumbar region with neurogenic claudication 9/14/2015       PAST SURGICAL HISTORY:   Past Surgical History:   Procedure Laterality Date     ANESTHESIA CARDIOVERSION N/A 9/14/2018    Procedure: ANESTHESIA CARDIOVERSION;;  Surgeon: GENERIC ANESTHESIA PROVIDER;  Location: [facility] OR     ANESTHESIA CARDIOVERSION N/A 10/11/2018    Procedure: ANESTHESIA CARDIOVERSION;  Cardioversion;  Surgeon: GENERIC ANESTHESIA PROVIDER;  Location: [facility] OR     ANESTHESIA CARDIOVERSION N/A 10/16/2018    Procedure: Anesthesia Cardioversion ;  Surgeon: GENERIC ANESTHESIA PROVIDER;  Location: [facility] OR     APPENDECTOMY       BIOPSY      hemorrhoidectomy     ENT SURGERY      tonsillectomy     GYN SURGERY      3 D & C's     HYSTERECTOMY, PAP NO LONGER INDICATED       LAMINECTOMY LUMBAR ONE LEVEL N/A 10/13/2015    Procedure: LAMINECTOMY LUMBAR ONE LEVEL;  Surgeon: Fransico Toussaint MD;  Location: [facility] OR       FAMILY HISTORY:   Family History   Problem Relation Age of Onset     Hypertension Mother      Psychotic Disorder Father      Diabetes Son      Diabetes Daughter      Blood Disease Daughter        SOCIAL HISTORY:   Social History     Tobacco Use     Smoking status: Never Smoker     Smokeless tobacco: Never Used   Substance Use Topics     Alcohol use: Yes     Comment: rare wine           Medication List      CONTINUE taking these medications    Blood Pressure Monitor Kit  1 kit daily as needed     * order for DME  Equipment being ordered: Dispense face mask.  Mrs. Spicer is immunosuppressed due to rheumatoid arthritis.     * order for DME  Equipment being ordered: Nebulizer. Use with Albuterol.     order for DME  Equipment being ordered: Dispense baffle, for use with nebulizer.     * order for DME  Dispense SP Walker-small         * This list has 3 medication(s) that are the same as other medications prescribed for you. Read the directions carefully, and ask your doctor or other care  provider to review them with you.            ASK your doctor about these medications    abatacept 250 MG injection  Commonly known as:  ORENCIA  Inject 750 mg into the vein every 28 days     acetaminophen 500 MG tablet  Commonly known as:  TYLENOL  Take 1 tablet (500 mg) by mouth every 6 hours as needed for mild pain or fever     albuterol (2.5 MG/3ML) 0.083% neb solution  Commonly known as:  PROVENTIL  Take 1 vial (2.5 mg) by nebulization every 6 hours as needed for shortness of breath / dyspnea or wheezing     apixaban ANTICOAGULANT 2.5 MG tablet  Commonly known as:  ELIQUIS  Take 1 tablet (2.5 mg) by mouth 2 times daily     azaTHIOprine 50 MG tablet  Commonly known as:  IMURAN  Take 1 tablet (50 mg) by mouth daily     calcium carbonate-vitamin D 500-400 MG-UNIT tablet  Commonly known as:  OS-FATOUMATA  Take 1 tablet by mouth daily     Carboxymethylcellulose Sod PF 1 % ophthalmic gel  Commonly known as:  CELLUVISC/REFRESH LIQUIGEL  Place 1 drop into both eyes daily as needed for dry eyes     carvedilol 3.125 MG tablet  Commonly known as:  COREG  Take 1 tablet (3.125 mg) by mouth 2 times daily (with meals)     cetirizine 10 MG tablet  Commonly known as:  ZYRTEC ALLERGY  Take 1 tablet (10 mg) by mouth At Bedtime     denosumab 60 MG/ML Soln injection  Commonly known as:  PROLIA  Inject 1 mL (60 mg) Subcutaneous every 6 months     folic acid 1 MG tablet  Commonly known as:  FOLVITE  Take 1 tablet (1 mg) by mouth daily     furosemide 20 MG tablet  Commonly known as:  LASIX  Take 1 tablet (20 mg) by mouth 2 times daily Hold Lasix 12/18 and 12/19, then start 20 mg daily after that.     hydrALAZINE 10 MG tablet  Commonly known as:  APRESOLINE  Take 1 tablet (10 mg) by mouth 3 times daily     hypromellose 0.3 % Soln ophthalmic solution  Commonly known as:  GENTEAL  Place 1 drop into both eyes daily as needed for dry eyes     levothyroxine 75 MCG tablet  Commonly known as:  SYNTHROID/LEVOTHROID  Take 1 tablet (75 mcg) by mouth  "daily     nitroGLYcerin 0.4 MG sublingual tablet  Commonly known as:  NITROSTAT  Place 1 tablet (0.4 mg) under the tongue every 5 minutes as needed for chest pain     PRESERVISION AREDS Caps  Take 1 capsule by mouth 2 times daily     ranibizumab 0.3 MG/0.05ML Soln  Commonly known as:  LUCENTIS  0.05 mLs (0.3 mg) by Intravitreal route See Admin Instructions Every 6 weeks     ranitidine 150 MG tablet  Commonly known as:  ZANTAC  TAKE ONE TABLET BY MOUTH TWICE DAILY     saccharomyces boulardii 250 MG capsule  Commonly known as:  FLORASTOR  Take 1 capsule (250 mg) by mouth daily     senna-docusate 8.6-50 MG tablet  Commonly known as:  SENOKOT-S/PERICOLACE  Take 1 tablet by mouth daily as needed for constipation     trimethoprim 100 MG tablet  Commonly known as:  TRIMPEX  Take 0.5 tablets (50 mg) by mouth daily     vitamin C 500 MG tablet  Commonly known as:  ASCORBIC ACID  Take 1 tablet (500 mg) by mouth daily               Allergies   Allergen Reactions     Cephalexin Hcl Diarrhea     Gabapentin Other (See Comments)     Dizzsiness     Naproxen GI Disturbance     Perfume      Macrobid [Nitrofurantoin Anhydrous]      Possibly related to lung disease      Seasonal Allergies      Sulfa Drugs      Throat swelling     Xanax [Alprazolam] Other (See Comments)     Dizziness      Ciprofloxacin Itching and Rash      Review of Systems   Constitutional: Positive for activity change and unexpected weight change.   HENT: Negative for postnasal drip.    Respiratory: Positive for shortness of breath. Negative for cough.    Cardiovascular: Positive for chest pain and leg swelling.   Gastrointestinal: Negative for abdominal pain, diarrhea, nausea and vomiting.   All other systems reviewed and are negative.      Physical Exam   BP: (!) 189/96  Pulse: 71  Heart Rate: 70  Temp: 97.8  F (36.6  C)  Resp: 20  Height: 162.6 cm (5' 4\")  Weight: 72.1 kg (159 lb)  SpO2: 99 %      Physical Exam   Constitutional: She is oriented to person, place, " and time. She appears well-developed and well-nourished. She appears distressed.   Elderly female, appears dyspneic, cooperative, in some distress   HENT:   Head: Normocephalic and atraumatic.   Eyes: Conjunctivae and EOM are normal. Pupils are equal, round, and reactive to light.   Cardiovascular: Normal rate, regular rhythm, normal heart sounds and intact distal pulses.   Pulmonary/Chest: She is in respiratory distress. She has no wheezes. She has no rales.   Crackles noted in both bases bilaterally to approximately FPC up the thorax.  Tachypneic, speaking in 2-3 word phrases.   Abdominal: Soft. Bowel sounds are normal. She exhibits no distension. There is no tenderness.   Musculoskeletal: She exhibits edema.   2-3+ bilateral lower extremity edema   Neurological: She is alert and oriented to person, place, and time.   Skin: Skin is warm and dry. She is not diaphoretic.   Nursing note and vitals reviewed.      ED Course        Procedures             EKG Interpretation:      Interpreted by Sharonda Nicholson  Time reviewed: 1830  Symptoms at time of EKG: None   Rhythm: paced  Rate: Normal  Axis: Normal  Ectopy: none  Conduction: left bundle branch block (complete)  ST Segments/ T Waves: No acute ischemic changes  Q Waves: none  Comparison to prior: similar to prior EKGs    Clinical Impression: paced rhythm                Critical Care time:  none             Results for orders placed or performed during the hospital encounter of 01/15/19   Chest XR,  PA & LAT    Narrative    Exam:  Chest X-ray 1/15/2019 8:24 PM    History: SOB, hx of CHF and ILD, eval for pulmonary edema    Comparison: 11/28/2018    Findings: PA and lateral views of the chest. The chest wall ICD with  the lead tips projecting over the right atrium and right ventricle.  Trachea is midline. Atherosclerotic calcifications of the aortic arch.  Stable enlarged cardiac mediastinal silhouette. Bibasilar reticular  opacities do not appear to be  significant changed. No new focal  pulmonary opacity. Mild pulmonary vascular congestion. No  pneumothorax. The visualized upper abdomen is unremarkable.      Impression    Impression:   1. Chronic changes of ILD are not significantly changed from the prior  chest x-ray. No superimposed focal opacity.  2. Mild pulmonary vascular congestion.    I have personally reviewed the examination and initial interpretation  and I agree with the findings.    DEBRA KOEHLER MD   CBC with platelets differential   Result Value Ref Range    WBC 6.7 4.0 - 11.0 10e9/L    RBC Count 3.88 3.8 - 5.2 10e12/L    Hemoglobin 13.0 11.7 - 15.7 g/dL    Hematocrit 39.1 35.0 - 47.0 %     (H) 78 - 100 fl    MCH 33.5 (H) 26.5 - 33.0 pg    MCHC 33.2 31.5 - 36.5 g/dL    RDW 13.0 10.0 - 15.0 %    Platelet Count 283 150 - 450 10e9/L    Diff Method Automated Method     % Neutrophils 65.1 %    % Lymphocytes 23.4 %    % Monocytes 9.4 %    % Eosinophils 1.6 %    % Basophils 0.4 %    % Immature Granulocytes 0.1 %    Nucleated RBCs 0 0 /100    Absolute Neutrophil 4.4 1.6 - 8.3 10e9/L    Absolute Lymphocytes 1.6 0.8 - 5.3 10e9/L    Absolute Monocytes 0.6 0.0 - 1.3 10e9/L    Absolute Eosinophils 0.1 0.0 - 0.7 10e9/L    Absolute Basophils 0.0 0.0 - 0.2 10e9/L    Abs Immature Granulocytes 0.0 0 - 0.4 10e9/L    Absolute Nucleated RBC 0.0    Comprehensive metabolic panel   Result Value Ref Range    Sodium 136 133 - 144 mmol/L    Potassium 3.7 3.4 - 5.3 mmol/L    Chloride 100 94 - 109 mmol/L    Carbon Dioxide 28 20 - 32 mmol/L    Anion Gap 8 3 - 14 mmol/L    Glucose 90 70 - 99 mg/dL    Urea Nitrogen 33 (H) 7 - 30 mg/dL    Creatinine 1.51 (H) 0.52 - 1.04 mg/dL    GFR Estimate 31 (L) >60 mL/min/[1.73_m2]    GFR Estimate If Black 36 (L) >60 mL/min/[1.73_m2]    Calcium 9.2 8.5 - 10.1 mg/dL    Bilirubin Total 0.4 0.2 - 1.3 mg/dL    Albumin 3.8 3.4 - 5.0 g/dL    Protein Total 7.8 6.8 - 8.8 g/dL    Alkaline Phosphatase 59 40 - 150 U/L    ALT 18 0 - 50 U/L    AST  "22 0 - 45 U/L   Troponin I   Result Value Ref Range    Troponin I ES 0.015 0.000 - 0.045 ug/L   Nt probnp inpatient   Result Value Ref Range    N-Terminal Pro BNP Inpatient 2,153 (H) 0 - 1,800 pg/mL   Blood gas venous   Result Value Ref Range    Ph Venous 7.39 7.32 - 7.43 pH    PCO2 Venous 50 40 - 50 mm Hg    PO2 Venous 30 25 - 47 mm Hg    Bicarbonate Venous 30 (H) 21 - 28 mmol/L    Base Excess Venous 4.1 mmol/L    FIO2 ROOM AIR    EKG 12 lead   Result Value Ref Range    Interpretation ECG Click View Image link to view waveform and result      *Note: Due to a large number of results and/or encounters for the requested time period, some results have not been displayed. A complete set of results can be found in Results Review.              Assessments & Plan (with Medical Decision Making)       This is an 87 year old female with a prior history of ILD likely associated with her RA, as well as atrial fibrillation and CHF who presents to the ED today from her rheumatology clinic due to shortness of breath.  Patient is very vague and is having difficulty giving specifics but does state that she is had increasing shortness of breath \"for a while\".  She has a prior history of issues with congestive heart failure and has had her meds adjusted.  She is currently taking 20 mg of Lasix twice a day.  She attended her rheumatology clinic appointment today and her rheumatologist was concerned about her dyspnea and tachypnea.  Therefore they elected to send her to the ED.  Patient is denying a cough.  She is reporting some increasing swelling of her legs and abdomen and her feels that her pants are not fitting as well.  She has been tracking her weight and has noticed approximately 5 pound increase in her weight over several months.    On exam she does appear to be in some respiratory distress, has crackles in both bases bilaterally.  2-3+ lower extremity edema noted.  She has a significant amount of dyspnea, can only speak 2-3 " words at a time with very minimal exertion such as sitting on the toilet.    Differential diagnosis certainly could include CHF exacerbation.  Infection is possible though I do not think is likely in this case.  Due to her pre-existing ILD, need to consider possible flare of this as well.  Again my underlying suspicion is that this is going to be a CHF exacerbation.  Most recent echo is from October 2008 at which point she had an EF of 55-60% with evidence of mild pulmonary hypertension, mild to moderate tricuspid insufficiency and mild to moderate mitral insufficiency.  ACS is possible though I think less likely.    We did establish IV access and we did draw blood for laboratory analysis.  CBC within normal limits, troponin within normal limits at 0.015, BMP shows renal insufficiency with creatinine 1.5 which is slightly above her baseline, and BNP is 2153..  EKG also ordered and this demonstrates paced rhythm, VBG WNL.  Chest x-ray demonstrates mild pulmonary vascular congestion.  Strongly suspect patient is going to require admission.  Will discuss with Cardiology team.    Her symptoms appear to be somewhat out of proportion to her evaluation.  She herself looks much worse than her numbers do.  I see this has been noted before in prior provider notes particularly from the cardiology clinic visit on January 3.  Will admit to cardiology at this time, it is possible that the combination of arrhythmia, CHF, and pre-existing ILD may be causing some of her significant symptoms.    This part of the document was transcribed by Melba Maldonado Medical Scribe.      I have reviewed the nursing notes.    I have reviewed the findings, diagnosis, plan and need for follow up with the patient.       Medication List      There are no discharge medications for this visit.         Final diagnoses:   Acute on chronic congestive heart failure, unspecified heart failure type (H)   ILD (interstitial lung disease) (H)     Melba ROJAS  Erica, am serving as a trained medical scribe to document services personally performed by Sharonda Nicholson MD based on the provider's statements to me on January 15, 2019.  This document has been checked and approved by the attending provider.    I, Sharonda Nicholson MD, was physically present and have reviewed and verified the accuracy of this note documented by Melba Maldonado, medical scribe.     1/15/2019   [facility], [place], EMERGENCY DEPARTMENT     Sharonda Nicholson MD  01/16/19 0133

## 2019-01-15 NOTE — ED NOTES
Bed: ED23  Expected date:   Expected time:   Means of arrival:   Comments:  Lou Korus  Tachycardic, a-fib? Pulmonary fibrosis

## 2019-01-16 ENCOUNTER — APPOINTMENT (OUTPATIENT)
Dept: CARDIOLOGY | Facility: CLINIC | Age: 84
DRG: 291 | End: 2019-01-16
Payer: MEDICARE

## 2019-01-16 ENCOUNTER — APPOINTMENT (OUTPATIENT)
Dept: OCCUPATIONAL THERAPY | Facility: CLINIC | Age: 84
DRG: 291 | End: 2019-01-16
Payer: MEDICARE

## 2019-01-16 ENCOUNTER — TELEPHONE (OUTPATIENT)
Dept: CARDIOLOGY | Facility: CLINIC | Age: 84
End: 2019-01-16

## 2019-01-16 ENCOUNTER — APPOINTMENT (OUTPATIENT)
Dept: ULTRASOUND IMAGING | Facility: CLINIC | Age: 84
DRG: 291 | End: 2019-01-16
Payer: MEDICARE

## 2019-01-16 LAB
ANION GAP SERPL CALCULATED.3IONS-SCNC: 9 MMOL/L (ref 3–14)
BUN SERPL-MCNC: 36 MG/DL (ref 7–30)
CALCIUM SERPL-MCNC: 8.7 MG/DL (ref 8.5–10.1)
CHLORIDE SERPL-SCNC: 102 MMOL/L (ref 94–109)
CO2 SERPL-SCNC: 28 MMOL/L (ref 20–32)
CREAT SERPL-MCNC: 1.59 MG/DL (ref 0.52–1.04)
CRP SERPL-MCNC: <2.9 MG/L (ref 0–8)
GFR SERPL CREATININE-BSD FRML MDRD: 29 ML/MIN/{1.73_M2}
GLUCOSE SERPL-MCNC: 84 MG/DL (ref 70–99)
INTERPRETATION ECG - MUSE: NORMAL
MAGNESIUM SERPL-MCNC: 1.9 MG/DL (ref 1.6–2.3)
POTASSIUM SERPL-SCNC: 3.5 MMOL/L (ref 3.4–5.3)
SODIUM SERPL-SCNC: 139 MMOL/L (ref 133–144)
TROPONIN I SERPL-MCNC: 0.02 UG/L (ref 0–0.04)

## 2019-01-16 PROCEDURE — 25000128 H RX IP 250 OP 636: Performed by: STUDENT IN AN ORGANIZED HEALTH CARE EDUCATION/TRAINING PROGRAM

## 2019-01-16 PROCEDURE — 80048 BASIC METABOLIC PNL TOTAL CA: CPT | Performed by: INTERNAL MEDICINE

## 2019-01-16 PROCEDURE — 97535 SELF CARE MNGMENT TRAINING: CPT | Mod: GO

## 2019-01-16 PROCEDURE — 93308 TTE F-UP OR LMTD: CPT | Mod: 26 | Performed by: INTERNAL MEDICINE

## 2019-01-16 PROCEDURE — 84484 ASSAY OF TROPONIN QUANT: CPT | Performed by: INTERNAL MEDICINE

## 2019-01-16 PROCEDURE — 97110 THERAPEUTIC EXERCISES: CPT | Mod: GO

## 2019-01-16 PROCEDURE — 93325 DOPPLER ECHO COLOR FLOW MAPG: CPT | Mod: 26 | Performed by: INTERNAL MEDICINE

## 2019-01-16 PROCEDURE — 93975 VASCULAR STUDY: CPT | Mod: TC

## 2019-01-16 PROCEDURE — A9270 NON-COVERED ITEM OR SERVICE: HCPCS | Mod: GY | Performed by: STUDENT IN AN ORGANIZED HEALTH CARE EDUCATION/TRAINING PROGRAM

## 2019-01-16 PROCEDURE — 83735 ASSAY OF MAGNESIUM: CPT | Performed by: INTERNAL MEDICINE

## 2019-01-16 PROCEDURE — 86140 C-REACTIVE PROTEIN: CPT | Performed by: INTERNAL MEDICINE

## 2019-01-16 PROCEDURE — 25000132 ZZH RX MED GY IP 250 OP 250 PS 637: Mod: GY | Performed by: STUDENT IN AN ORGANIZED HEALTH CARE EDUCATION/TRAINING PROGRAM

## 2019-01-16 PROCEDURE — 40000133 ZZH STATISTIC OT WARD VISIT

## 2019-01-16 PROCEDURE — 12000001 ZZH R&B MED SURG/OB UMMC

## 2019-01-16 PROCEDURE — 25000132 ZZH RX MED GY IP 250 OP 250 PS 637: Mod: GY | Performed by: HOSPITALIST

## 2019-01-16 PROCEDURE — 97165 OT EVAL LOW COMPLEX 30 MIN: CPT | Mod: GO

## 2019-01-16 PROCEDURE — 93321 DOPPLER ECHO F-UP/LMTD STD: CPT | Mod: 26 | Performed by: INTERNAL MEDICINE

## 2019-01-16 PROCEDURE — 93308 TTE F-UP OR LMTD: CPT

## 2019-01-16 PROCEDURE — A9270 NON-COVERED ITEM OR SERVICE: HCPCS | Mod: GY | Performed by: HOSPITALIST

## 2019-01-16 PROCEDURE — 99223 1ST HOSP IP/OBS HIGH 75: CPT | Mod: 25 | Performed by: INTERNAL MEDICINE

## 2019-01-16 PROCEDURE — 36415 COLL VENOUS BLD VENIPUNCTURE: CPT | Performed by: INTERNAL MEDICINE

## 2019-01-16 PROCEDURE — 25000131 ZZH RX MED GY IP 250 OP 636 PS 637: Mod: GY | Performed by: STUDENT IN AN ORGANIZED HEALTH CARE EDUCATION/TRAINING PROGRAM

## 2019-01-16 RX ORDER — FUROSEMIDE 20 MG
20 TABLET ORAL 2 TIMES DAILY
Status: DISCONTINUED | OUTPATIENT
Start: 2019-01-16 | End: 2019-01-16

## 2019-01-16 RX ORDER — MAGNESIUM SULFATE 1 G/100ML
1 INJECTION INTRAVENOUS ONCE
Status: COMPLETED | OUTPATIENT
Start: 2019-01-16 | End: 2019-01-16

## 2019-01-16 RX ORDER — FUROSEMIDE 20 MG
20 TABLET ORAL
Status: DISCONTINUED | OUTPATIENT
Start: 2019-01-16 | End: 2019-01-16

## 2019-01-16 RX ORDER — ISOSORBIDE MONONITRATE 30 MG/1
30 TABLET, EXTENDED RELEASE ORAL DAILY
Status: DISCONTINUED | OUTPATIENT
Start: 2019-01-16 | End: 2019-01-18

## 2019-01-16 RX ORDER — FUROSEMIDE 10 MG/ML
40 INJECTION INTRAMUSCULAR; INTRAVENOUS 2 TIMES DAILY WITH MEALS
Status: DISCONTINUED | OUTPATIENT
Start: 2019-01-16 | End: 2019-01-16

## 2019-01-16 RX ORDER — FUROSEMIDE 20 MG
20 TABLET ORAL 2 TIMES DAILY
Status: DISCONTINUED | OUTPATIENT
Start: 2019-01-16 | End: 2019-01-17

## 2019-01-16 RX ORDER — TRIMETHOPRIM 100 MG/1
50 TABLET ORAL DAILY
Status: DISCONTINUED | OUTPATIENT
Start: 2019-01-16 | End: 2019-01-20 | Stop reason: HOSPADM

## 2019-01-16 RX ORDER — POTASSIUM CHLORIDE 750 MG/1
20 TABLET, EXTENDED RELEASE ORAL ONCE
Status: COMPLETED | OUTPATIENT
Start: 2019-01-16 | End: 2019-01-16

## 2019-01-16 RX ORDER — POLYETHYLENE GLYCOL 3350 17 G/17G
17 POWDER, FOR SOLUTION ORAL DAILY
Status: DISCONTINUED | OUTPATIENT
Start: 2019-01-16 | End: 2019-01-20 | Stop reason: HOSPADM

## 2019-01-16 RX ADMIN — Medication 50 MG: at 17:37

## 2019-01-16 RX ADMIN — HYDRALAZINE HYDROCHLORIDE 10 MG: 10 TABLET, FILM COATED ORAL at 13:24

## 2019-01-16 RX ADMIN — CARVEDILOL 3.12 MG: 3.12 TABLET, FILM COATED ORAL at 09:30

## 2019-01-16 RX ADMIN — MAGNESIUM SULFATE IN DEXTROSE 1 G: 10 INJECTION, SOLUTION INTRAVENOUS at 12:04

## 2019-01-16 RX ADMIN — AZATHIOPRINE 50 MG: 50 TABLET ORAL at 17:37

## 2019-01-16 RX ADMIN — LEVOTHYROXINE SODIUM 75 MCG: 25 TABLET ORAL at 09:26

## 2019-01-16 RX ADMIN — RANITIDINE 150 MG: 150 TABLET ORAL at 09:25

## 2019-01-16 RX ADMIN — APIXABAN 2.5 MG: 2.5 TABLET, FILM COATED ORAL at 09:27

## 2019-01-16 RX ADMIN — ISOSORBIDE MONONITRATE 30 MG: 30 TABLET, EXTENDED RELEASE ORAL at 12:04

## 2019-01-16 RX ADMIN — FOLIC ACID 1 MG: 1 TABLET ORAL at 09:26

## 2019-01-16 RX ADMIN — CARVEDILOL 3.12 MG: 3.12 TABLET, FILM COATED ORAL at 17:37

## 2019-01-16 RX ADMIN — POTASSIUM CHLORIDE 20 MEQ: 750 TABLET, EXTENDED RELEASE ORAL at 09:27

## 2019-01-16 RX ADMIN — HYDRALAZINE HYDROCHLORIDE 10 MG: 10 TABLET, FILM COATED ORAL at 20:06

## 2019-01-16 RX ADMIN — CETIRIZINE HYDROCHLORIDE 10 MG: 10 TABLET, FILM COATED ORAL at 21:03

## 2019-01-16 RX ADMIN — Medication 500 MG: at 09:25

## 2019-01-16 RX ADMIN — POLYETHYLENE GLYCOL 3350 17 G: 17 POWDER, FOR SOLUTION ORAL at 17:40

## 2019-01-16 RX ADMIN — HYDRALAZINE HYDROCHLORIDE 10 MG: 10 TABLET, FILM COATED ORAL at 09:26

## 2019-01-16 ASSESSMENT — ACTIVITIES OF DAILY LIVING (ADL)
PREVIOUS_RESPONSIBILITIES: LAUNDRY;MEDICATION MANAGEMENT
ADLS_ACUITY_SCORE: 17
ADLS_ACUITY_SCORE: 16
ADLS_ACUITY_SCORE: 16
ADLS_ACUITY_SCORE: 17
ADLS_ACUITY_SCORE: 17
ADLS_ACUITY_SCORE: 18

## 2019-01-16 ASSESSMENT — ENCOUNTER SYMPTOMS
VOMITING: 0
NAUSEA: 0
ABDOMINAL PAIN: 0
DIARRHEA: 0

## 2019-01-16 NOTE — H&P
Cardiology History and Physical, [facility]  Patient: Linda Spicer MRN# [id number]   Age: 87 year old YOB: 1931     Attending physician: [id number] Dr. 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     # Acute on chronic respiratory failure  # Chest discomfort on exertion  # CHF exacerbation  # Mitral insufficiency (mild-moderate)  # Rheumatoid pulmonary fibrosis  EF 55%, mild-moderate mitral valve insufficiency per last TTE. SOB is most likely multifactorial, secondary to rheumatoid pulmonary fibrosis, CHF, MR, possible CAD etc. Has been on lasix 20 mg BID. Has leg edema, Wt slightly up, being compliant on meds. On physical exam, the sign of mild fluid overload seen. No infection sign. Pt responded to IV diuretics. Oxygenation is preserved without supplementary O2. Has chest discomfort on exertion, and improves with rest. Characteristic could be angina, also could be secondary to valve insufficiency. Troponin was  Negative, no change in EKG. Spironolactone has been held due to NEIL.  - schedule IV Lasix 40mg BID for now  - K/Mg check with correction as needed  - continue aspirin 81 mg, coreg 3.125 mg BID, hydralazine 10 mg TID   - NPO at MN for possible procedure  - daily weight, strict I&Os  - TTE ordered    # RA  # Pulmonary fibrosis  # RA associated ILD  On orencia 750mg IV every 4 weeks.  - Continue azathioprine 50 mg qday  - Pulmonary cosult    # CKD  Cr is 1.5, baseline is 1.3-1.4.  - Monitor Cr and avoid nephrotoxins    # Paroxysmal Afib s/p multiple cardioversions  # Tachy lisa s/p PPM  [id number] 4. Amiodalone was discontinued in  11/2018.Had cardioversions 9/14/18,10/11/18, 10/16/18  - Continue apixaban 2.5 mg BID, coreg 3.125 mg BID    # Hypothyroid  - continue levothyroxine 75 mcg qday    # Hypertension -coreg 3.125 mg BID, hydralazine 10 mg TID     Code status: DNR  FEN: Cardiac, NPO at MN  DVT Prophylaxis: on apixaban  Family: son updated  Dispo: pending further improvement but likely to home    The patient to be discussed with attending physician in the morning.   The patient was discussed with Cardiology Fellow.    Juana Garza MD PhD  Internal Medicine PGY-2  [phone]          Chief complaint:     Shortness of Breath  Chest discomfort          History of Present Illness:   History obtained from the patient and medical record.    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 She states she has been feeling poorly for a while, but progressively getting worse for the past week. She is especially SOB on exertion. Cannot even complete a sentence. As a baseline she was able to walk about a mile without any difficulties last summer. She lives in independent living facility, and able to take care of herself, but would get exhausted by walking in a house. She also has chest discomfort on exertion, which locates under the left breast. The pain is pressure-like, and would go away with the rest. Denied palpitation, dizziness, lightheadedness, syncope or fall. Checks her weight daily, and today was 157 lb, and reported that DW is 154 lb. Denied orthopnea, and able to sleep flat. Denied fever, chills, cough, sputum, n/v/c/d, or dysuria. She denies sick contacts. ROS otherwise negative.     Last echo 11/29/18 showed an EF 55-60% mild-moderate mitral insufficiency.    In the ED the patient was hypertensive 151/79 HR in the 60's and afebrile.  Labs: troponin was negative, SCr 1.51, BUN 33, (baseline Cr around 1.3-1.4), Nt pro BNP is elevated at 2153. She has WBC of 6.7 hgb at baseline of 13. The patient was treated with 40mg Iv lasix and sent to the floor.      PTA cardiac meds  apixaban 2.5mg BID  Coreg 3.125mg BID  Lasix 20 mg BID          Past Medical History:     Past Medical History:   Diagnosis Date     Adjustment disorder with anxious mood 5/18/2015     Advanced directives, counseling/discussion 8/30/2012    Patient states has Advance Directive and will bring in a copy to clinic. 8/30/2012   [facility] Medical Assistant \       Anemia 9/25/2015     Basal cell cancer      CHF (congestive heart failure) (H) 9/18/2014     CKD (chronic kidney disease) stage 3, GFR 30-59 ml/min (H) 9/29/2015     DDD (degenerative disc disease), lumbar 9/25/2015     Diffuse idiopathic pulmonary fibrosis (H) 5/6/2013     Encounter for palliative care 5/18/2015     History of blood transfusion 9/29/2015     Hypertension goal BP (blood pressure) < 140/90 9/7/2012     Hypothyroid 9/7/2012     Irritable bowel syndrome 10/29/2013     Macular degeneration      Macular degeneration, 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     Nondisplaced spiral fracture of shaft of humerus      Osteoporosis 8/13/2013     Imo Update utility     RA (rheumatoid arthritis) (H) 5/7/2013     Rheumatic fever      Shingles      Spinal stenosis of lumbar region with neurogenic claudication 9/14/2015     Past Surgical History:   Procedure Laterality Date     ANESTHESIA CARDIOVERSION N/A 9/14/2018    Procedure: ANESTHESIA CARDIOVERSION;;  Surgeon: GENERIC ANESTHESIA PROVIDER;  Location: [facility] OR     ANESTHESIA CARDIOVERSION N/A 10/11/2018    Procedure: ANESTHESIA CARDIOVERSION;  Cardioversion;  Surgeon: GENERIC ANESTHESIA PROVIDER;  Location: [facility] OR     ANESTHESIA CARDIOVERSION N/A 10/16/2018    Procedure: Anesthesia Cardioversion ;  Surgeon: GENERIC ANESTHESIA PROVIDER;  Location: [facility] OR     APPENDECTOMY       BIOPSY       hemorrhoidectomy     ENT SURGERY      tonsillectomy     GYN SURGERY      3 D & C's     HYSTERECTOMY, PAP NO LONGER INDICATED       LAMINECTOMY LUMBAR ONE LEVEL N/A 10/13/2015    Procedure: LAMINECTOMY LUMBAR ONE LEVEL;  Surgeon: Fransico Toussaint MD;  Location:  OR     Last Cardiac Hospitalization: 2018  Last Angiogram: [facility] in     Echocardiogram:  Recent Results (from the past 4320 hour(s))   Echo limited (Adults Only)    Narrative    [id number]  [id number]  [id number]  [id number]^CHAD^YANDY^JORGE L           [facility],[place]  Echocardiography Laboratory  [address] [place] [id number]     Name: RG WALTER  MRN: [id number]  : 1931  Study Date: 2018 11:24 AM  Age: 87 yrs  Gender: Female  Patient [place] [facility] [place]  Reason For Study: Mitral Valve Disorder  Ordering Physician: YANDY BONILLA  Performed By: Elver Smith RDCS     BSA: 1.7 m2  Height: 63 in  Weight: 156 lb  BP: 114/63 mmHg  _____________________________________________________________________________  __        Procedure  Limited Portable Echo Adult.  _____________________________________________________________________________  __        Interpretation Summary  Limited study.  Normal biventricular size and systolic function. The Ejection Fraction is  estimated at 55-60%.  Mild to moderate mitral insufficiency is present.  Mild to moderate tricuspid insufficiency is present.  Estimated pulmonary artery systolic pressure is 26 mmHg plus right atrial  pressure.  Compared to ECHO on 10/9, there are no significant changes. Mitral  regurgitation less prominent compared to recent YOLANDA (10/16).     _____________________________________________________________________________  __        Left Ventricle  Left ventricular size is normal. There is increased LV wall thickness. The  Ejection Fraction is estimated at 55-60%. No regional wall motion  abnormalities are seen.     Right Ventricle  The right  ventricle is normal size. Global right ventricular function is  normal.     Atria  Moderate right atrial enlargement is present. Moderate left atrial enlargement  is present.        Mitral Valve  Mild to moderate mitral insufficiency is present.     Aortic Valve  Trace aortic insufficiency is present.     Tricuspid Valve  Mild to moderate tricuspid insufficiency is present. Estimated pulmonary  artery systolic pressure is 26 mmHg plus right atrial pressure. There are  multiple TR jets.     Pulmonic Valve  The pulmonic valve cannot be assessed.     Vessels  The inferior vena cava is normal. The thoracic aorta cannot be assessed.     Pericardium  No pericardial effusion is present.        Compared to Previous Study  Compared to ECHO on 10/9, there are no significant changes. MR on YOLANDA (10/16)  appears more severe.  _____________________________________________________________________________  __           Doppler Measurements & Calculations  TV max P.5 mmHg  TR max adrian: 252.4 cm/sec  TR max P.5 mmHg     _____________________________________________________________________________  __           Report approved by: Blanca Adams 2018 02:40 PM      ECHO COMPLETE WITH OPTISON    Narrative    [id number]  ECH73  [id number]  [id number]^DINAH^CRYS           [facility],[place]  Echocardiography Laboratory  [address] [place] [id number]     Name: RG WALTER  MRN: [id number]  : 1931  Study Date: 10/09/2018 01:09 PM  Age: 87 yrs  Gender: Female  Patient Location: [facility] [place]  Reason For Study: CHF  History: CHF  Ordering Physician: CRYS NIX  Referring Physician: ERIC ENGLE  Performed By: Sharonda Galo RDCS     BSA: 1.8 m2  Height: 63 in  Weight: 164 lb  BP: 180/87 mmHg  _____________________________________________________________________________  __        Procedure  Complete Portable Echo Adult. Contrast Optison. Optison (NDC  #2086-9162-85)  given intravenously. Patient was given 6 ml mixture of 3 ml Optison and 6 ml  saline. 3 ml wasted.  _____________________________________________________________________________  __        Interpretation Summary  Global and regional left ventricular function is normal with an EF of 55-60%.  The right ventricle is not well visualized.  Mild to moderate tricuspid insufficiency is present.  There is mild pulmonary hypertension.  The inferior vena cava was normal in size.  This study was compared with the study from 8/28/14 .  There has been no change.     _____________________________________________________________________________  __        Left Ventricle  Global and regional left ventricular function is normal with an EF of 55-60%.  Left ventricular size is normal. Left ventricular wall thickness is normal.  Left ventricular diastolic function is indeterminate.     Right Ventricle  Likely normal RV function but unable to assess size. The right ventricle is  not well visualized.     Atria  Both atria appear normal. The atrial septum is intact as assessed by color  Doppler .     Mitral Valve  The mitral valve is normal. Mild to moderate mitral insufficiency is present.        Aortic Valve  Aortic valve is normal in structure and function. The aortic valve is  tricuspid. Mild aortic insufficiency is present.     Tricuspid Valve  Mild to moderate tricuspid insufficiency is present. Right ventricular  systolic pressure is 40mmHg above the right atrial pressure. Mild (pulmonary  artery systolic pressure<50mmHg) pulmonary hypertension is present.     Pulmonic Valve  The pulmonic valve is normal.     Vessels  The aorta root is normal. The thoracic aorta is normal. The pulmonary artery  is normal. The inferior vena cava was normal in size with preserved  respiratory variability. Estimated mean right atrial pressure is 3 mmHg  (normal).     Pericardium  No pericardial effusion is present.        Compared to  Previous Study  This study was compared with the study from 14 . There has been no  change.     Attestation  I have personally viewed the imaging and agree with the interpretation and  report as documented by the fellow, Parris Duvall, and/or edited by me.  _____________________________________________________________________________  __     MMode/2D Measurements & Calculations  IVSd: 1.1 cm  LVIDd: 3.7 cm  LVIDs: 1.9 cm  LVPWd: 1.0 cm  FS: 47.7 %  LV mass(C)d: 122.1 grams  LV mass(C)dI: 68.7 grams/m2  Ao root diam: 2.7 cm  LA dimension: 4.3 cm  asc Aorta Diam: 3.4 cm  LA/Ao: 1.6  LVOT diam: 2.0 cm  LVOT area: 3.1 cm2  LA Volume (BP): 51.3 ml     LA Volume Index (BP): 28.8 ml/m2  RWT: 0.54        Doppler Measurements & Calculations  MV E max alvarado: 104.0 cm/sec  MV A max alvarado: 33.8 cm/sec  MV E/A: 3.1  MV dec time: 0.14 sec  Ao V2 max: 89.2 cm/sec  Ao max PG: 3.0 mmHg  Ao V2 mean: 66.3 cm/sec  Ao mean P.0 mmHg  Ao V2 VTI: 16.6 cm  RAJ(I,D): 3.5 cm2  RAJ(V,D): 3.2 cm2  LV V1 max PG: 3.3 mmHg  LV V1 max: 90.7 cm/sec  LV V1 VTI: 18.7 cm  SV(LVOT): 58.7 ml  SI(LVOT): 33.1 ml/m2  PA acc time: 0.06 sec  TR max alvarado: 242.0 cm/sec  TR max P.7 mmHg  AV Alvarado Ratio (DI): 1.0  RAJ Index (cm2/m2): 2.0  Medial E/e': 19.5        _____________________________________________________________________________  __        Report approved by: Blanca Adams 10/09/2018 04:28 PM              Allergies:     Allergies   Allergen Reactions     Cephalexin Hcl Diarrhea     Gabapentin Other (See Comments)     Dizzsiness     Naproxen GI Disturbance     Perfume      Macrobid [Nitrofurantoin Anhydrous]      Possibly related to lung disease      Seasonal Allergies      Sulfa Drugs      Throat swelling     Xanax [Alprazolam] Other (See Comments)     Dizziness      Ciprofloxacin Itching and Rash           Family History:     Family History   Problem Relation Age of Onset     Hypertension Mother      Psychotic Disorder Father       Diabetes Son      Diabetes Daughter      Blood Disease Daughter            Social History:   Tobacco: non smoler  EtOH: occasional drinker  Illicit substances: No  Social History     Socioeconomic History     Marital status:      Spouse name: Not on file     Number of children: Not on file     Years of education: Not on file     Highest education level: Not on file   Social Needs     Financial resource strain: Not on file     Food insecurity - worry: Not on file     Food insecurity - inability: Not on file     Transportation needs - medical: Not on file     Transportation needs - non-medical: Not on file   Occupational History     Not on file   Tobacco Use     Smoking status: Never Smoker     Smokeless tobacco: Never Used   Substance and Sexual Activity     Alcohol use: Yes     Comment: rare wine      Drug use: No     Sexual activity: No   Other Topics Concern     Parent/sibling w/ CABG, MI or angioplasty before 65F 55M? No   Social History Narrative    . Has six children. She enjoys [employer] and [employer].  passed away.  Her son has financial and alcohol issues.           Medications:     No current facility-administered medications on file prior to encounter.   Current Outpatient Medications on File Prior to Encounter:  abatacept (ORENCIA) 250 MG injection Inject 750 mg into the vein every 28 days   acetaminophen (TYLENOL) 500 MG tablet Take 1 tablet (500 mg) by mouth every 6 hours as needed for mild pain or fever   albuterol (PROVENTIL) (2.5 MG/3ML) 0.083% neb solution Take 1 vial (2.5 mg) by nebulization every 6 hours as needed for shortness of breath / dyspnea or wheezing   apixaban ANTICOAGULANT (ELIQUIS) 2.5 MG tablet Take 1 tablet (2.5 mg) by mouth 2 times daily   azaTHIOprine (IMURAN) 50 MG tablet Take 1 tablet (50 mg) by mouth daily   Blood Pressure Monitor KIT 1 kit daily as needed   calcium carbonate-vitamin D (OS-FATOUMATA) 500-400 MG-UNIT tablet Take 1 tablet by mouth daily    Carboxymethylcellulose Sod PF (CELLUVISC/REFRESH LIQUIGEL) 1 % ophthalmic gel Place 1 drop into both eyes daily as needed for dry eyes   carvedilol (COREG) 3.125 MG tablet Take 1 tablet (3.125 mg) by mouth 2 times daily (with meals)   cetirizine (ZYRTEC ALLERGY) 10 MG tablet Take 1 tablet (10 mg) by mouth At Bedtime   denosumab (PROLIA) 60 MG/ML SOLN injection Inject 1 mL (60 mg) Subcutaneous every 6 months   folic acid (FOLVITE) 1 MG tablet Take 1 tablet (1 mg) by mouth daily   furosemide (LASIX) 20 MG tablet Take 1 tablet (20 mg) by mouth 2 times daily Hold Lasix 12/18 and 12/19, then start 20 mg daily after that.   hydrALAZINE (APRESOLINE) 10 MG tablet Take 1 tablet (10 mg) by mouth 3 times daily   hypromellose (GENTEAL) 0.3 % SOLN ophthalmic solution Place 1 drop into both eyes daily as needed for dry eyes   levothyroxine (SYNTHROID/LEVOTHROID) 75 MCG tablet Take 1 tablet (75 mcg) by mouth daily   Multiple Vitamins-Minerals (PRESERVISION AREDS) CAPS Take 1 capsule by mouth 2 times daily   nitroGLYcerin (NITROSTAT) 0.4 MG sublingual tablet Place 1 tablet (0.4 mg) under the tongue every 5 minutes as needed for chest pain   order for DME Dispense SP Magdy   order for DME Equipment being ordered: Dispense baffle, for use with nebulizer.   order for DME Equipment being ordered: Nebulizer. Use with Albuterol.   order for DME Equipment being ordered: Dispense face mask.Mrs. Spicer is immunosuppressed due to rheumatoid arthritis.   ranibizumab (LUCENTIS) 0.3 MG/0.05ML SOLN 0.05 mLs (0.3 mg) by Intravitreal route See Admin Instructions Every 6 weeks   ranitidine (ZANTAC) 150 MG tablet TAKE ONE TABLET BY MOUTH TWICE DAILY   saccharomyces boulardii (FLORASTOR) 250 MG capsule Take 1 capsule (250 mg) by mouth daily   senna-docusate (SENOKOT-S/PERICOLACE) 8.6-50 MG tablet Take 1 tablet by mouth daily as needed for constipation   trimethoprim (TRIMPEX) 100 MG tablet Take 0.5 tablets (50 mg) by mouth daily   vitamin C  "(ASCORBIC ACID) 500 MG tablet Take 1 tablet (500 mg) by mouth daily           Physical Examination:   /68   Pulse 68   Temp 97.7  F (36.5  C) (Oral)   Resp 22   Ht 1.626 m (5' 4\")   Wt 72.1 kg (159 lb)   LMP  (LMP Unknown)   SpO2 96%   BMI 27.29 kg/m    Last 3 days weights:  Patient Vitals for the past 72 hrs:   Weight   01/15/19 1719 72.1 kg (159 lb)     Patient Vitals for the past 120 hrs:   Weight   01/15/19 1719 72.1 kg (159 lb)     Wt Readings from Last 5 Encounters:   01/15/19 72.1 kg (159 lb)   01/15/19 73.9 kg (163 lb)   01/05/19 73.9 kg (163 lb)   01/03/19 73.9 kg (163 lb)   12/27/18 73.9 kg (162 lb 14.4 oz)     Intake/Output Summary (Last 24 hours) at 1/15/2019 2652  Last data filed at 1/15/2019 2000  Gross per 24 hour   Intake --   Output 800 ml   Net -800 ml     GENERAL APPEARANCE: pleasant adult, NAD  HEENT: NCAT, oropharynx clear, no icterus or injection, no palatal jaundice  NECK: no cervical LAD  LUNGS: crackles at base  CARDIOVASCULAR: Heart is with regular rate/rhythm. Mild systolic murmur, no JVD   lower extremities edema 2+/2+  ABDOMEN: soft and nontender, nondistended, BS+  SKIN: unremarkable; no rash or lesions  MUSCULOSKELETAL: no joint effusions  EXTREMITIES: lower extremities edema 2+/2+  NEUROLOGIC: alert and oriented, speech fluent; PERRL, EOMI, facial muscles symmetric; no gait abnormalities appreciated.  Psychological: appropriate mood           Laboratory Data:   Metabolic Studies  Recent Labs   Lab Test 01/15/19  1936 01/05/19  1610 01/03/19  0934 12/26/18  0957 12/19/18  1541  12/01/18  0759  11/18/18  0608  10/16/18  0734 10/12/18  0504  05/02/18  1050    139 138 138 135   < > 137   < > 138   < > 137 134   < >  --    POTASSIUM 3.7 3.9 4.0 4.0 3.9   < > 4.0   < > 4.1   < > 4.5 4.1   < >  --    CHLORIDE 100  --  104 104 101   < > 107   < > 102   < > 103 99   < >  --    CO2 28  --  26 28 27   < > 23   < > 29   < > 25 27   < >  --    ANIONGAP 8  --  8 6 8   < > 7   < " > 7   < > 9 9   < >  --    GLC 90 81 96 94 95   < > 91   < > 87   < > 98 92   < >  --    BUN 33*  --  25 29 35*   < > 40*   < > 37*   < > 47* 51*   < >  --    CR 1.51*  --  1.50* 1.43* 1.41*   < > 1.57*   < > 1.72*   < > 1.55* 1.31*   < > 1.36*   GFRESTIMATED 31* 36* 31* 33* 33*   < > 31*   < > 28*   < > 32* 38*   < > 37*   GFRESTBLACK 36* 43* 36* 38* 39*   < > 38*   < > 34*   < > 38* 46*   < > 45*   FATOUMATA 9.2  --  7.9* 8.7 8.0*   < > 7.4*   < > 9.0   < > 8.6 9.4   < > 9.6   MAG  --   --   --   --   --   --  2.4*  --  2.0  --  2.1 2.1   < >  --    PHOS  --   --   --   --   --   --   --   --   --   --   --   --   --  3.8    < > = values in this interval not displayed.       Recent Labs   Lab Test 01/15/19  1936 12/02/18  0658 11/28/18  1558 11/17/18  1124  08/18/16  1002  07/07/15  1535   PROTTOTAL 7.8 6.6* 7.5 7.4   < >  --    < > 8.5   ALBUMIN 3.8 3.1* 3.5 3.4   < >  --    < > 4.0   BILITOTAL 0.4 0.4 0.4 0.6   < >  --    < > 0.4   ALKPHOS 59 57 65 60   < >  --    < > 58   AST 22 19 21 24   < >  --    < > 21   ALT 18 18 21 22   < >  --    < > 29   LIPASE  --   --   --  116  --  137  --  139    < > = values in this interval not displayed.     Hematology  Recent Labs   Lab Test 01/15/19  1936 01/05/19  1610 12/03/18  0547 12/02/18  0658 12/01/18  0759   WBC 6.7  --  5.9 6.2 7.4   RBC 3.88  --  3.22* 3.23* 3.46*   HGB 13.0 11.6* 10.7* 10.6* 11.5*   HCT 39.1  --  32.6* 32.9* 35.2   *  --  101* 102* 102*   MCH 33.5*  --  33.2* 32.8 33.2*   MCHC 33.2  --  32.8 32.2 32.7   RDW 13.0  --  13.0 13.2 13.4     --  232 237 239     INR  Recent Labs   Lab Test 11/17/18  1124 10/16/18  0734 10/11/18  0756   INR 1.40* 1.32* 1.34*     Troponin  Recent Labs   Lab Test 01/15/19  1936 01/05/19  1609 11/28/18  1558 11/17/18  1124   TROPI 0.015  --  <0.015 <0.015   TROPONIN  --  0.02  --   --      Venous Blood Gas Recent Labs   Lab Test 01/15/19  1937 01/05/19  1610 06/20/14  1600   PHV 7.39 7.38  --    PCO2V 50 47  --    PO2V  30 25  --    HCO3V 30* 28  --    CAMELIA 4.1  --   --    O2PER ROOM AIR  --  0.21     Thyroid Studies  Recent Labs   Lab Test 11/28/18  1558 11/17/18  1124 04/04/18  1510 12/07/17  1157  08/01/16  0845   TSH 0.87 0.82 0.53 0.89   < > 0.64   T4  --   --  1.25 1.30  --  1.19    < > = values in this interval not displayed.     Inflammatory Markers  Recent Labs   Lab Test 12/01/18  0759 05/02/18  1050 02/07/18  1008 11/10/17  0925 08/18/17  0934 05/11/17  0931 02/16/17  0949 04/19/16  1800 04/05/16  1036   SED 28  --  34* 23 18 27  --   --  28   CRP <2.9 <2.9 3.7 <2.9 <2.9 <2.9 4.5 <2.9  --      Hepatitis B Testing Recent Labs   Lab Test 04/05/16  1036   HBCAB Nonreactive   HEPBANG Nonreactive     Hepatitis C Testing   Hepatitis C Antibody   Date Value Ref Range Status   04/05/2016  NR Final    Nonreactive   Assay performance characteristics have not been established for newborns,   infants, and children       Microbiology:  Culture Micro   Date Value Ref Range Status   11/23/2018 No growth  Final   11/17/2018 (A)  Final    50,000 to 100,000 colonies/mL  Enterococcus faecalis     11/09/2018 (A)  Final    50,000 to 100,000 colonies/mL  Enterococcus faecalis     11/09/2018   Final    <10,000 colonies/mL  mixed urogenital osvaldo  Susceptibility testing not routinely done     10/30/2018 >100,000 colonies/mL  Klebsiella pneumoniae   (A)  Final   10/11/2018 (A)  Final    >100,000 colonies/mL  Citrobacter freundii complex     10/11/2018   Final    <10,000 colonies/mL  urogenital osvaldo  Susceptibility testing not routinely done     09/27/2018 >100,000 colonies/mL  Klebsiella pneumoniae   (A)  Final   02/13/2018   Final    >100,000 colonies/mL  urogenital osvaldo  Susceptibility testing not routinely done     02/13/2018 (A)  Final    10,000 to 50,000 colonies/mL  Coagulase negative Staphylococcus     01/04/2018   Final    10,000 to 50,000 colonies/mL  mixed urogenital osvaldo  Susceptibility testing not routinely done     11/21/2017  >100,000 colonies/mL  Citrobacter amalonaticus   (A)  Final   08/09/2016   Final    <10,000 colonies/mL mixed urogenital osvaldo Susceptibility testing not routinely   done     07/24/2016 No growth  Final   05/23/2016 >100,000 colonies/mL Citrobacter freundii complex (A)  Final   04/19/2016 No growth  Final   07/07/2015   Final    10,000 to 50,000 colonies/mL mixed urogenital osvaldo   04/08/2015 No Beta Streptococcus isolated  Final   02/23/2015 No Beta Streptococcus isolated  Final   10/02/2014   Final    10,000 to 50,000 colonies/mL mixed urogenital osvaldo   08/27/2014 >100,000 colonies/mL Escherichia coli (A)  Final   07/25/2014 No yeast isolated  Final   09/24/2013 >100,000 colonies/mL Klebsiella pneumoniae  Final   10/09/2012   Final    >100,000 colonies/mL Enterococcus species  10 to 50,000 colonies/mL Diptheroids Susceptibility testing not routinely done   09/05/2012 Normal osvaldo  Final   09/04/2012 >100,000 colonies/mL Proteus mirabilis  Final   09/04/2012 Normal osvaldo  Final       Most Recent 6 Bacteria Isolates From Any Culture (See EPIC Reports for Culture Details):  Recent Labs   Lab Test 11/23/18  1538 11/17/18  1124 11/09/18  1040 10/30/18  1013 10/11/18  1551 09/27/18  1124   CULT No growth 50,000 to 100,000 colonies/mL  Enterococcus faecalis  * 50,000 to 100,000 colonies/mL  Enterococcus faecalis  *  <10,000 colonies/mL  mixed urogenital osvaldo  Susceptibility testing not routinely done   >100,000 colonies/mL  Klebsiella pneumoniae  * >100,000 colonies/mL  Citrobacter freundii complex  *  <10,000 colonies/mL  urogenital osvaldo  Susceptibility testing not routinely done   >100,000 colonies/mL  Klebsiella pneumoniae  *       Results for orders placed or performed during the hospital encounter of 01/15/19 (from the past 48 hour(s))   EKG 12 lead   Result Value Ref Range    Interpretation ECG Click View Image link to view waveform and result    CBC with platelets differential   Result Value Ref Range    WBC  6.7 4.0 - 11.0 10e9/L    RBC Count 3.88 3.8 - 5.2 10e12/L    Hemoglobin 13.0 11.7 - 15.7 g/dL    Hematocrit 39.1 35.0 - 47.0 %     (H) 78 - 100 fl    MCH 33.5 (H) 26.5 - 33.0 pg    MCHC 33.2 31.5 - 36.5 g/dL    RDW 13.0 10.0 - 15.0 %    Platelet Count 283 150 - 450 10e9/L    Diff Method Automated Method     % Neutrophils 65.1 %    % Lymphocytes 23.4 %    % Monocytes 9.4 %    % Eosinophils 1.6 %    % Basophils 0.4 %    % Immature Granulocytes 0.1 %    Nucleated RBCs 0 0 /100    Absolute Neutrophil 4.4 1.6 - 8.3 10e9/L    Absolute Lymphocytes 1.6 0.8 - 5.3 10e9/L    Absolute Monocytes 0.6 0.0 - 1.3 10e9/L    Absolute Eosinophils 0.1 0.0 - 0.7 10e9/L    Absolute Basophils 0.0 0.0 - 0.2 10e9/L    Abs Immature Granulocytes 0.0 0 - 0.4 10e9/L    Absolute Nucleated RBC 0.0    Comprehensive metabolic panel   Result Value Ref Range    Sodium 136 133 - 144 mmol/L    Potassium 3.7 3.4 - 5.3 mmol/L    Chloride 100 94 - 109 mmol/L    Carbon Dioxide 28 20 - 32 mmol/L    Anion Gap 8 3 - 14 mmol/L    Glucose 90 70 - 99 mg/dL    Urea Nitrogen 33 (H) 7 - 30 mg/dL    Creatinine 1.51 (H) 0.52 - 1.04 mg/dL    GFR Estimate 31 (L) >60 mL/min/[1.73_m2]    GFR Estimate If Black 36 (L) >60 mL/min/[1.73_m2]    Calcium 9.2 8.5 - 10.1 mg/dL    Bilirubin Total 0.4 0.2 - 1.3 mg/dL    Albumin 3.8 3.4 - 5.0 g/dL    Protein Total 7.8 6.8 - 8.8 g/dL    Alkaline Phosphatase 59 40 - 150 U/L    ALT 18 0 - 50 U/L    AST 22 0 - 45 U/L   Troponin I   Result Value Ref Range    Troponin I ES 0.015 0.000 - 0.045 ug/L   Nt probnp inpatient   Result Value Ref Range    N-Terminal Pro BNP Inpatient 2,153 (H) 0 - 1,800 pg/mL   Blood gas venous   Result Value Ref Range    Ph Venous 7.39 7.32 - 7.43 pH    PCO2 Venous 50 40 - 50 mm Hg    PO2 Venous 30 25 - 47 mm Hg    Bicarbonate Venous 30 (H) 21 - 28 mmol/L    Base Excess Venous 4.1 mmol/L    FIO2 ROOM AIR    Chest XR,  PA & LAT    Narrative    Exam:  Chest X-ray 1/15/2019 8:24 PM    History: SOB, hx of CHF  and ILD, eval for pulmonary edema    Comparison: 11/28/2018    Findings: PA and lateral views of the chest. The chest wall ICD with  the lead tips projecting over the right atrium and right ventricle.  Trachea is midline. Atherosclerotic calcifications of the aortic arch.  Stable enlarged cardiac mediastinal silhouette. Bibasilar reticular  opacities do not appear to be significant changed. No new focal  pulmonary opacity. Mild pulmonary vascular congestion. No  pneumothorax. The visualized upper abdomen is unremarkable.      Impression    Impression:   1. Chronic changes of ILD are not significantly changed from the prior  chest x-ray. No superimposed focal opacity.  2. Mild pulmonary vascular congestion.    I have personally reviewed the examination and initial interpretation  and I agree with the findings.    DEBRA KOEHLER MD     *Note: Due to a large number of results and/or encounters for the requested time period, some results have not been displayed. A complete set of results can be found in Results Review.     Testing/Procedures:  PULMONARY FUNCTION TESTS:   PFT Latest Ref Rng & Units 12/19/2018   FVC L 1.68   FEV1 L 1.35   FVC% % 73   FEV1% % 78     EKG: V- paced with PVCs    LINES/TUBES:   Peripheral IV 01/15/19 Left Lower forearm (Active)   Site Assessment WDL 1/15/2019  7:51 PM   Line Status Saline locked 1/15/2019  7:51 PM   Phlebitis Scale 0-->no symptoms 1/15/2019  7:51 PM   Infiltration Scale 0 1/15/2019  7:51 PM   Infiltration Site Treatment Method  None 1/15/2019  7:51 PM   Extravasation? No 1/15/2019  7:51 PM   Dressing Intervention New dressing  1/15/2019  7:51 PM   Number of days: 0

## 2019-01-16 NOTE — PROGRESS NOTES
"SPIRITUAL HEALTH SERVICES  SPIRITUAL ASSESSMENT Progress Note  [facility] 6D   ON-CALL VISIT    REFERRAL SOURCE: Request on admission.    Visited pt, introduced spiritual health, offered emotional support and prayer. Pt expressed that she is here at the hospital because of her health challenges and that she is waiting for the care team to run several test to find out the problem. Pt said \"I am blessed,\" and expressed how much her children loves and cares for her, and also her friends at the care home where she is currently at. She said, \"I want to get well so that my children do not have to give up some things while taking care of me.\" She said, \"my   in September and I am still grieving.\" \" I miss him so much\" and expressed that the death of her  happened sooner that expected. She is grateful to the care team and the [facility] for all the care and support. She appreciates on-going  support.       PLAN: I will notify unit/on-call  of the visit.     Pat Sandoval  Chaplain Resident  Pager  [phone]    "

## 2019-01-16 NOTE — PROVIDER NOTIFICATION
Pt became very faint while attempting to have BM. Pt denies straining. BP taken while pt sitting -right after episode - 80/46, When pt laying in bed pts /65. MD notified. Continue to monitor closely.

## 2019-01-16 NOTE — PROGRESS NOTES
[facility], [place]   ED Nurse to Floor Handoff     Linda Spicer is a 87 year old female who speaks English and lives alone at [facility]  in a nursing home  They arrived in the ED by car from home    ED Chief Complaint: Shortness of Breath and Palpitations    ED Dx;   Final diagnoses:   Acute on chronic congestive heart failure, unspecified heart failure type (H)   ILD (interstitial lung disease) (H)         Needed?: No    Allergies:   Allergies   Allergen Reactions     Cephalexin Hcl Diarrhea     Gabapentin Other (See Comments)     Dizzsiness     Naproxen GI Disturbance     Perfume      Macrobid [Nitrofurantoin Anhydrous]      Possibly related to lung disease      Seasonal Allergies      Sulfa Drugs      Throat swelling     Xanax [Alprazolam] Other (See Comments)     Dizziness      Ciprofloxacin Itching and Rash   .  Past Medical Hx:   Past Medical History:   Diagnosis Date     Adjustment disorder with anxious mood 5/18/2015     Advanced directives, counseling/discussion 8/30/2012    Patient states has Advance Directive and will bring in a copy to clinic. 8/30/2012   Tevin May  [facility] Medical Assistant \       Anemia 9/25/2015     Basal cell cancer      CHF (congestive heart failure) (H) 9/18/2014     CKD (chronic kidney disease) stage 3, GFR 30-59 ml/min (H) 9/29/2015     DDD (degenerative disc disease), lumbar 9/25/2015     Diffuse idiopathic pulmonary fibrosis (H) 5/6/2013     Encounter for palliative care 5/18/2015     History of blood transfusion 9/29/2015     Hypertension goal BP (blood pressure) < 140/90 9/7/2012     Hypothyroid 9/7/2012     Irritable bowel syndrome 10/29/2013     Macular degeneration      Macular degeneration, 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     Nondisplaced spiral fracture of shaft of humerus      Osteoporosis 8/13/2013     Imo Update utility     RA (rheumatoid arthritis) (H) 5/7/2013     Rheumatic fever      Shingles       Spinal stenosis of lumbar region with neurogenic claudication 9/14/2015      Baseline Mental status: WDL  Current Mental Status changes: at basesline    Infection present or suspected this encounter: no  Sepsis suspected: No  Isolation type: No active isolations     Activity level - Baseline/Home:  Stand with Assist  Activity Level - Current:   Stand with Assist    Bariatric equipment needed?: No    In the ED these meds were given:   Medications   furosemide (LASIX) injection 40 mg (not administered)       Drips running?  No- lasix 40 mg IV given.     Home pump  No    Current LDAs  Peripheral IV 01/15/19 Left Lower forearm (Active)   Site Assessment WDL 1/15/2019  7:51 PM   Line Status Saline locked 1/15/2019  7:51 PM   Phlebitis Scale 0-->no symptoms 1/15/2019  7:51 PM   Infiltration Scale 0 1/15/2019  7:51 PM   Infiltration Site Treatment Method  None 1/15/2019  7:51 PM   Extravasation? No 1/15/2019  7:51 PM   Dressing Intervention New dressing  1/15/2019  7:51 PM   Number of days: 0       Incision/Surgical Site 10/13/15 Midline;Posterior Back (Active)   Number of days: 1190       Labs results:   Labs Ordered and Resulted from Time of ED Arrival Up to the Time of Departure from the ED   CBC WITH PLATELETS DIFFERENTIAL - Abnormal; Notable for the following components:       Result Value     (*)     MCH 33.5 (*)     All other components within normal limits   COMPREHENSIVE METABOLIC PANEL - Abnormal; Notable for the following components:    Urea Nitrogen 33 (*)     Creatinine 1.51 (*)     GFR Estimate 31 (*)     GFR Estimate If Black 36 (*)     All other components within normal limits   NT PROBNP INPATIENT - Abnormal; Notable for the following components:    N-Terminal Pro BNP Inpatient 2,153 (*)     All other components within normal limits   BLOOD GAS VENOUS - Abnormal; Notable for the following components:    Bicarbonate Venous 30 (*)     All other components within normal limits   TROPONIN I  "  CARDIAC CONTINUOUS MONITORING   ISTAT TROPONIN NURSING POCT   INTAKE AND OUTPUT       Imaging Studies:   Recent Results (from the past 24 hour(s))   Chest XR,  PA & LAT    Impression    Impression:   1. Chronic changes of ILD are not significantly changed from the prior  chest x-ray. No superimposed focal opacity.  2. Mild pulmonary vascular congestion.       Recent vital signs:   /79   Pulse 71   Temp 97.8  F (36.6  C) (Oral)   Resp 10   Ht 1.626 m (5' 4\")   Wt 72.1 kg (159 lb)   LMP  (LMP Unknown)   SpO2 99%   BMI 27.29 kg/m      Cardiac Rhythm: Normal Sinus -Paced  Pt needs tele? Yes  Skin/wound Issues: None    Code Status: Full Code    Pain control: fair    Nausea control: pt had none    Abnormal labs/tests/findings requiring intervention: See epic    Family present during ED course? Yes   Family Comments/Social Situation comments: Son supportive at bedside     Tasks needing completion: None    Rhonda Wang, RN  [phone] [facility] ED    "

## 2019-01-16 NOTE — PLAN OF CARE
OT 6C  Discharge Planner OT   Patient plan for discharge: Home  Current status: Eval complete, treatment indicated. SBA sit<>stand with FWW> SBA for g/h and sponge bathing while standing at the sink. SBA for LB dressing, toilet transfer and clothing management. Pt ambulated ~50ft with 4WW and SBA.   Barriers to return to prior living situation: fatigue, SOB, acute medical needs  Recommendations for discharge: Home with assist as needed from family and HH OT/PT  Rationale for recommendations: Pt is primarily independent with ADL completion, however would benefit from continued skilled therapy to increase activity tolerance and exercise endurance.        Entered by: Sherley Chamberlain 01/16/2019 9:52 AM

## 2019-01-16 NOTE — TELEPHONE ENCOUNTER
Patient calling stating that the number wanting to be called is Xi's number as her brothers phone . Referring tot he call today for a check up. Please advise.

## 2019-01-16 NOTE — PROGRESS NOTES
01/16/19 0900   Quick Adds   Type of Visit Initial Occupational Therapy Evaluation   Living Environment   Lives With facility resident   Living Arrangements independent living facility   Home Accessibility no concerns   Living Environment Comment Pt lives alone in an independent living facility. She has a tub with grab bars and a shower chair    Self-Care   Usual Activity Tolerance moderate   Current Activity Tolerance fair   Equipment Currently Used at Home walker, rolling;walker, standard;wheelchair, manual;shower chair;grab bar, tub/shower   Activity/Exercise/Self-Care Comment pt was previously Mod I with ADL completion   Functional Level   Ambulation 1-->assistive equipment   Transferring 1-->assistive equipment   Toileting 0-->independent   Bathing 1-->assistive equipment   Dressing 0-->independent   Eating 0-->independent   Communication 0-->understands/communicates without difficulty   Cognition 0 - no cognition issues reported   Fall history within last six months yes   Number of times patient has fallen within last six months 3   Which of the above functional risks had a recent onset or change? 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    Heart Disease Risk Factors Lack of physical activity;Age   General Observations pt is pleasant and agreeable to therapy   General Info Comments Activity: up ad noelle   Cognitive Status Examination   Orientation orientation to person, place  and time   Level of Consciousness alert   Follows Commands (Cognition) WNL   Memory intact   Attention No deficits were identified   Organization/Problem Solving No deficits were identified   Executive Function No deficits were identified   Cognitive Comment Pt is alert, oriented and generally appropriate in conversation. WIll continue to monitor   Visual Perception   Visual Perception No deficits were identified   Sensory Examination   Sensory Quick Adds No deficits were identified   Integumentary/Edema   Integumentary/Edema no deficits were identifed   Range of Motion (ROM)   ROM Comment BUE ROM WFL   Strength   Strength Comments BUE grossly 5/5, however generalized weakness   Hand Strength   Hand Strength Comments Bilateral  strength WNL   Transfer Skill: Sit to Stand   Level of [place]: Sit/Stand stand-by assist   Physical Assist/Nonphysical Assist: Sit/Stand supervision   Transfer Skill: Sit to Stand full weight-bearing   Assistive Device for Transfer: Sit/Stand standard walker   Transfer Skill: Toilet Transfer   Level of [place]: Toilet stand-by assist  (commode)   Physical Assist/Nonphysical Assist: Toilet supervision   Weight-Bearing Restrictions: Toilet full weight-bearing   Assistive Device standard walker   Grooming   Level of [place]: Grooming stand-by assist   Physical Assist/Nonphysical Assist: Grooming supervision   Instrumental Activities of Daily Living (IADL)   Previous Responsibilities laundry;medication management   IADL Comments Pt has services come in for cleaning and meal prep. Pt family assists with most IADls such as medications, finanaces, etc.    Activities of Daily Living Analysis   Impairments Contributing to Impaired Activities of Daily Living balance impaired;strength decreased   General Therapy Interventions   Planned Therapy Interventions ADL retraining;IADL retraining;strengthening;home program guidelines;progressive activity/exercise   Clinical Impression  "  Criteria for Skilled Therapeutic Interventions Met yes, treatment indicated   OT Diagnosis decreased activity tolerance and independence with ALDs   Influenced by the following impairments weakness, fatigue, SOB, deconditioning   Assessment of Occupational Performance 5 or more Performance Deficits   Identified Performance Deficits functional mobility, g/h, toileting, dressing, bathing   Clinical Decision Making (Complexity) Low complexity   Therapy Frequency daily   Predicted Duration of Therapy Intervention (days/wks) 1/30/19   Anticipated Equipment Needs at Discharge other (see comments)  (TBD)   Anticipated Discharge Disposition Home with Home Therapy;Home with Assist   Risks and Benefits of Treatment have been explained. Yes   Patient, Family & other staff in agreement with plan of care Yes   [facility] [employer] TM \"6 Clicks\"   2016, Trustees of [facility], under license to [employer].  All rights reserved.   6 Clicks Short Forms Daily Activity Inpatient Short Form   [facility] [employer]  \"6 Clicks\" Daily Activity Inpatient Short Form   1. Putting on and taking off regular lower body clothing? 3 - A Little   2. Bathing (including washing, rinsing, drying)? 3 - A Little   3. Toileting, which includes using toilet, bedpan or urinal? 4 - None   4. Putting on and taking off regular upper body clothing? 4 - None   5. Taking care of personal grooming such as brushing teeth? 4 - None   6. Eating meals? 4 - None   Daily Activity Raw Score (Score out of 24.Lower scores equate to lower levels of function) 22   Total Evaluation Time   Total Evaluation Time (Minutes) 5     "

## 2019-01-16 NOTE — PROGRESS NOTES
Admission Note:    Admitted from: [facility]  For: SOB, chest pressure, fluid overload  Admission profile, care plan, education complete. Med rec verified.   Oriented to room/unit, call dont fall and plan  Voiding on commode  Belongings: clothing, 2 hearing aides, glasses, ring, watch.   No complaints at this time.     Will continue to monitor and notify MD with pertinent changes.

## 2019-01-16 NOTE — PROGRESS NOTES
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      Changes today:   - Add Imdur 30mg every day for BP control   - TTE   - PT/OT   - Angiogram tomorrow for w/u of mitraclip per communication w/ Msdaxa Castro     #Chronic SOB w/ exertion  # L-sided chest discomfort   # Mitral insufficiency (mild-moderate)  Suspect sxs related to HTN and possible pulm edema in the setting of diastolic dysfunction and MR; sxs likely exacerbated by underlying pulm fibrosis and general deconditioning; wts stable at 154lb (which is EDW), BNP not elevated above BL, and no signs of volume overload on exam so acute HF exacerbation unlikely; ACS unlikely (trop negative x2, EKG unchanged from prior); L sided chest discomfort sounds non-cardiac (likely MSK); no signs of infection, RA pulm fibrosis flare unlikely (stable CXR, stable dx on last pulm apt in 1/2018); HRs has been well-controlled here so unlikely rate-related   - TTE today to eval volume status, MR, and overall heart function   - BP control as below   - Patient plans to pursue mitraclip in February (angiogram tomorrow for w/u per communication w/ daxa Fisher)  - PT/OT   - Pulm rehab referral for outpatient     #HTN urgency   BPs intermittently elevated (max 189/96), likely contributing to sxs; hx of poorly-controlled HTN  - Cont hydralazine 10mg TID  - Add Imdur 30mg every day for BP control      #HFpEF   EF 55%, mild-moderate mitral valve insufficiency per last TTE in 11/2018; no signs of acute exacerbation- wts stable at 154lb (which is EDW), BNP not elevated above BL, and no signs of  volume overload on exam    - TTE as above  - Cont Coreg 3.125mg BID   - On Lasix 20mg BID at home, s/p Lasix 40mg IV in the ED on 1/15- cont home dosing (may adjust based on TTE)     # RA  # Pulmonary fibrosis  # RA associated ILD  On Orencia 750mg IV every 4 weeks; RA pulm fibrosis flare unlikely (stable CXR, stable dx on last pulm apt in 1/2018)   - Continue azathioprine 50 mg qday     # CKD  Baseline is 1.3-1.4; stable; present since 2014; underlying etiology unclear (possibly HTN), does not appear to have seen a nephrologist in the past  - Monitor Cr and avoid nephrotoxins     # Paroxysmal Afib s/p multiple cardioversions  # Tachy lisa s/p PPM   [id number] 4. Amiodalone was discontinued in 11/2018. Had cardioversions 9/14/18,10/11/18, 10/16/18; per cardiology notes, EP has stated she is not a candidate for ablation  - Continue apixaban 2.5 mg BID, coreg 3.125 mg BID     # Hypothyroid  - continue levothyroxine 75 mcg qday     #Hx of recurrent UTIs   - Cont trimeoprim 50mg every day for ppx per urology    #Osteoporosis  - On denosumab Q6mo    #Macular degeneration  - On Lucentis [id number]     Code status: DNR  FEN: 2g Na restricted, 2L fluid restricted   DVT Prophylaxis: on apixaban  Dispo: pending TTE and better BP management, likely home within 2-3 days; (PT/OT consulted)    The patient's care was discussed with the Attending Physician, Dr. Simeon.    Mariola Angeles MD  Cardiology 1 Service  [facility], [place]  Pager: [phone]  ______________________________________________________________________    Interval History   NAEO; patient endorsed L sided, dull chest soreness, worse when she talks, also has a separate spot of soreness on her back; still w/ SOB w/ talking;     Data reviewed today: I reviewed all medications, new labs and imaging results over the last 24 hours. I personally reviewed.    Physical Exam   Vital Signs: Temp: 98.6  F (37  C) Temp src: Oral BP: 134/75 Pulse: 68  Heart Rate: 71 Resp: 18 SpO2: 95 % O2 Device: None (Room air)    Weight: 154 lbs 8.68 oz  GENERAL APPEARANCE: NAD; on RA  LUNGS: crackles at base  CARDIOVASCULAR: Heart is with regular rate/rhythm. Mild systolic murmur, no JVD   ABDOMEN: soft and nontender, nondistended, BS+  SKIN: unremarkable; no rash or lesions  MUSCULOSKELETAL: no joint effusions  EXTREMITIES: 1+ lower extremity edema b/l  NEUROLOGIC: alert and oriented, speech fluent; PERRL, EOMI, facial muscles symmetric; no gait abnormalities appreciated.  Psychological: tearful on exam     Data   Recent Labs   Lab 01/16/19  0525 01/15/19  1936   WBC  --  6.7   HGB  --  13.0   MCV  --  101*   PLT  --  283    136   POTASSIUM 3.5 3.7   CHLORIDE 102 100   CO2 28 28   BUN 36* 33*   CR 1.59* 1.51*   ANIONGAP 9 8   FATOUMATA 8.7 9.2   GLC 84 90   ALBUMIN  --  3.8   PROTTOTAL  --  7.8   BILITOTAL  --  0.4   ALKPHOS  --  59   ALT  --  18   AST  --  22   TROPI 0.024 0.015     Recent Results (from the past 24 hour(s))   Chest XR,  PA & LAT    Narrative    Exam:  Chest X-ray 1/15/2019 8:24 PM    History: SOB, hx of CHF and ILD, eval for pulmonary edema    Comparison: 11/28/2018    Findings: PA and lateral views of the chest. The chest wall ICD with  the lead tips projecting over the right atrium and right ventricle.  Trachea is midline. Atherosclerotic calcifications of the aortic arch.  Stable enlarged cardiac mediastinal silhouette. Bibasilar reticular  opacities do not appear to be significant changed. No new focal  pulmonary opacity. Mild pulmonary vascular congestion. No  pneumothorax. The visualized upper abdomen is unremarkable.      Impression    Impression:   1. Chronic changes of ILD are not significantly changed from the prior  chest x-ray. No superimposed focal opacity.  2. Mild pulmonary vascular congestion.    I have personally reviewed the examination and initial interpretation  and I agree with the findings.    DEBRA KOEHLER MD   US Renal Complete w  Duplex Complete    Narrative    US RENAL COMPLETE WITH DOPPLER COMPLETE   1/16/2019 11:27 AM      HISTORY: rule out renal artery stenosis    FINDINGS: The right kidney measures 8.7 in length. The left kidney  measures 9.6 in length. The kidneys are normal in size, shape,  position, and echogenicity. There is no hydronephrosis. The bladder is  partially filled and normal.    Grayscale, color, and spectral ultrasound performed. Renal artery  waveforms are normal. Resistive indices are normal on the left and  right ranging from 0.71-0.76. Renal veins are patent with antegrade  flow.      Impression    IMPRESSION: Normal renal ultrasound. Normal Doppler evaluation of the  kidneys.    DEIRDRE STAHL MD     Medications     - MEDICATION INSTRUCTIONS -       - MEDICATION INSTRUCTIONS -         apixaban ANTICOAGULANT  2.5 mg Oral BID     azaTHIOprine  50 mg Oral Daily     calcium carbonate-vitamin D  1 tablet Oral Daily     carvedilol  3.125 mg Oral BID w/meals     cetirizine  10 mg Oral At Bedtime     folic acid  1 mg Oral Daily     furosemide  20 mg Oral BID     hydrALAZINE  10 mg Oral TID     isosorbide mononitrate  30 mg Oral Daily     levothyroxine  75 mcg Oral Daily     [START ON 1/17/2019] ranitidine  150 mg Oral Daily     trimethoprim  50 mg Oral Daily     vitamin C  500 mg Oral Daily

## 2019-01-16 NOTE — PROGRESS NOTES
Social Work: Assessment with Discharge Plan    Patient Name:  Linda Spicer  :  1931  Age:  87 year old  MRN:  [id number]  Risk/Complexity Score:     Completed assessment with: Chart review, patient, patient's son    Presenting Information   Reason for Referral:  Discharge plan  Date of Intake:  January 15, 2019  Referral Source:  Chart Review  Decision Maker: Patient  Alternate Decision Maker: Patient's daughter Xi (p) [phone] and patient's son Arjun (p) [phone]  Health Care Directive:  Will bring in copy  Living Situation:  Independent senior apartment at [facility]  Previous Functional Status:  Assistance with Housekeeping:  chore provider and Bathing:  daughter  Patient and family understanding of hospitalization:  SOB, chest pain, back pain   Cultural/Language/Spiritual Considerations: Pentec[employer]alism per demographics  Adjustment to Illness: Appears significantly fatigued, short of breath    Physical Health  Reason for Admission:  No diagnosis found.  Services Needed/Recommended:  Other:  Return to independent living, consider additional services if needed    Mental Health/Chemical Dependency  Diagnosis: Endorses actively grieving the death of her  ( 68 years), he     Support/Services in Place: informal - Supportive friends and family  Services Needed/Recommended:  Continued support from friends and family    Support System  Significant relationship at present time: Adult children   Family of origin is available for support:  yes  Other support available: Patient and family aware of private hire services as needed  Gaps in support system:  none  Patient is caregiver to:  None     Provider Information   Primary Care Physician:  Tre Lockett   [phone]   Clinic:  [facility] [address] [place] [address]      :  -    Financial   Income Source:  Not discussed.  Financial Concerns:  None reported.  Insurance:    Payor/Plan Subscriber Name Rel  Member # Group #   MEDICARE - MEDICARE RG WALTER  [id number]       ATTN CLAIMS, [address]   BCBS - [place]BS OF [place] RG [place]TER  [id number]      [address] [address]       Discharge Plan   Patient and family discharge goal:  Senior apartment  Provided education on discharge plan:  YES  Patient agreeable to discharge plan:  YES  A list of Medicare Certified Facilities was provided to the patient and/or family to encourage patient choice. Patient's choices for facility are: Strong preference for home discharge.  If TCU needed, may consider [facility].  Would not consider [facility] Will NH provide Skilled rehabilitation or complex medical:  YES  General information regarding anticipated insurance coverage and possible out of pocket cost was discussed. Patient and patient's family are aware patient may incur the cost of transportation to the facility, pending insurance payment: NO  Barriers to discharge:  TBD - Pending patient's progress and further recommendation.    Discharge Recommendations   Anticipated Disposition:  Home with services  Transportation Needs:  Family:  sons or elizabeth  Name of Transportation Company and Phone:  -    Additional comments   SW consulted via chart review given patient's age (87), recent loss of spouse and from the facility.  SW met with patient, Rg, along with her son Arjun to discuss.  Rg is alert and oriented, has some difficulty speaking at length but is conversational as her breathing allows.    Rg is an 87-year-old recently   female who lives in independent senior living at [facility].  She has a daughter Xi who assists with weekly showers.  She has 2 sons who are also involved and supportive.  Rg shares that her  of 68+ years  last September after being on on hospice.  She has been grieving this loss.     Rg's son noted a significant decrease in quality of life for his mother.  He  "describes that she often feels \"crummy\" more days than not.  She has been contemplating a cardiac surgery (MitraClip) in hopes that her quality of life may improve post surgery.  He noted that she is aware that surgery would be risky but she feels her quality of life is decreased enough that it would be worth the risk.  Son acknowledges that surgery may not be an option depending her medical disposition at this time.    Plan: TBD - Pending patient's progress and further recommendation.  Linda hopeful for home discharge, would consider increased services as needed.  Family has worked with [facility] before to discuss private hire services. If TCU recommended.,  Linda would consider [facility].    DENVER Mcclain, [facility]  Social Work Services, Emergency Dept [facility]  Pager: [phone] Mon-Sat 9 am - 9 pm, on-call/after hours pager [phone]    "

## 2019-01-16 NOTE — PROGRESS NOTES
SPIRITUAL HEALTH SERVICES Significant Event  Hindu Sacrament of ANOINTING  [facility] 8488-01    Pt anointed and given Communion by Father Christiano per request of Pt.    Jennifer Mcgowan

## 2019-01-16 NOTE — PLAN OF CARE
NEURO: Alert and oriented x4.   RESPIRATORY: LS crackles in the bases, SpO2 >90% on RA and at rest. Pt de-sats into mid 80s  and becomes SOB with activity.   CARDIO: V paced- occasional PVC. SBP. Trace edema BLE.   GI/: BS active. Voiding without difficulty- lasix given. Tolerating oral intake.   SKIN: Intact. Pale.   ACTIVITY: Up with assist 1 and walker. Activity limited by SOB. Pt independently repositions.   PAIN: Denied pain.   LINES: PIV infusing mag replacement without issue.   PLAN: Continue diuresis, monitor labs and vitals. Notify providers with concerns.

## 2019-01-16 NOTE — PROGRESS NOTES
"CLINICAL NUTRITION SERVICES    Received nutrition consult to educate pt on 2 g sodium diet (automatic check on order set).  Per pt, understands this education, has had previously, and desires no further nutrition education at this time.    She reports getting 1 low sodium meal/day from a meal delivery program called \"[employer]\".  Writer was unable to find this program online.  She also notes that she doesn't use salt when she cooks, she uses frozen vegetables.  She does eat out ocassionally and often will get the \"you pick 2\" at [facility] where she'll get a salad and soup.  She only eats 1/2 of the soup and saves the rest for another day.  She asked for additional ideas about meal programs and restaurants for more variety in her diet.  Will research this and send ideas to her email/home address provided.      Minna Corcoran MS, RD, LD  Pager [phone]          "

## 2019-01-16 NOTE — TELEPHONE ENCOUNTER
Patient is awaiting a call from Rochelle Agrawal, according the the [facility] .  Please call Xi, patient's daughter at [phone].  Will route this message to Rochelle and her nurse.    Dina Swan, RN  Care Coordinator  [facility]  [phone]

## 2019-01-16 NOTE — PLAN OF CARE
D: Pt admitted with SOB, chest pressure and fluid overload. Hx of pulmonary fibrosis, CHF, paroxsymal afib s/p PPM, mitral regurgitation, and RA.     I/A: Vital signs stable, afebrile, A&Ox4, paced rhythm. Denied pain overnight. Up with SBA, voiding adequate amounts. Slept between cares. NPO for possible testing.     P: ECHO to be done today. Continue to diurese. Notify MD with pertinent changes.

## 2019-01-17 ENCOUNTER — CARE COORDINATION (OUTPATIENT)
Dept: CARDIOLOGY | Facility: CLINIC | Age: 84
End: 2019-01-17

## 2019-01-17 LAB
ANION GAP SERPL CALCULATED.3IONS-SCNC: 6 MMOL/L (ref 3–14)
ANION GAP SERPL CALCULATED.3IONS-SCNC: 9 MMOL/L (ref 3–14)
BUN SERPL-MCNC: 46 MG/DL (ref 7–30)
BUN SERPL-MCNC: 48 MG/DL (ref 7–30)
CALCIUM SERPL-MCNC: 8.5 MG/DL (ref 8.5–10.1)
CALCIUM SERPL-MCNC: 8.7 MG/DL (ref 8.5–10.1)
CHLORIDE SERPL-SCNC: 102 MMOL/L (ref 94–109)
CHLORIDE SERPL-SCNC: 103 MMOL/L (ref 94–109)
CO2 SERPL-SCNC: 27 MMOL/L (ref 20–32)
CO2 SERPL-SCNC: 29 MMOL/L (ref 20–32)
CREAT SERPL-MCNC: 1.82 MG/DL (ref 0.52–1.04)
CREAT SERPL-MCNC: 1.83 MG/DL (ref 0.52–1.04)
ERYTHROCYTE [DISTWIDTH] IN BLOOD BY AUTOMATED COUNT: 13.3 % (ref 10–15)
ERYTHROCYTE [SEDIMENTATION RATE] IN BLOOD BY WESTERGREN METHOD: 25 MM/H (ref 0–30)
GFR SERPL CREATININE-BSD FRML MDRD: 24 ML/MIN/{1.73_M2}
GFR SERPL CREATININE-BSD FRML MDRD: 24 ML/MIN/{1.73_M2}
GLUCOSE SERPL-MCNC: 126 MG/DL (ref 70–99)
GLUCOSE SERPL-MCNC: 91 MG/DL (ref 70–99)
HCT VFR BLD AUTO: 34.8 % (ref 35–47)
HGB BLD-MCNC: 11.4 G/DL (ref 11.7–15.7)
INR PPP: 1.31 (ref 0.86–1.14)
MAGNESIUM SERPL-MCNC: 2.3 MG/DL (ref 1.6–2.3)
MCH RBC QN AUTO: 33 PG (ref 26.5–33)
MCHC RBC AUTO-ENTMCNC: 32.8 G/DL (ref 31.5–36.5)
MCV RBC AUTO: 101 FL (ref 78–100)
PLATELET # BLD AUTO: 272 10E9/L (ref 150–450)
POTASSIUM SERPL-SCNC: 3.9 MMOL/L (ref 3.4–5.3)
POTASSIUM SERPL-SCNC: 4.1 MMOL/L (ref 3.4–5.3)
RBC # BLD AUTO: 3.45 10E12/L (ref 3.8–5.2)
SODIUM SERPL-SCNC: 138 MMOL/L (ref 133–144)
SODIUM SERPL-SCNC: 139 MMOL/L (ref 133–144)
WBC # BLD AUTO: 6.2 10E9/L (ref 4–11)

## 2019-01-17 PROCEDURE — 12000001 ZZH R&B MED SURG/OB UMMC

## 2019-01-17 PROCEDURE — 25000131 ZZH RX MED GY IP 250 OP 636 PS 637: Mod: GY | Performed by: STUDENT IN AN ORGANIZED HEALTH CARE EDUCATION/TRAINING PROGRAM

## 2019-01-17 PROCEDURE — 99233 SBSQ HOSP IP/OBS HIGH 50: CPT | Mod: GC | Performed by: INTERNAL MEDICINE

## 2019-01-17 PROCEDURE — 36415 COLL VENOUS BLD VENIPUNCTURE: CPT | Performed by: STUDENT IN AN ORGANIZED HEALTH CARE EDUCATION/TRAINING PROGRAM

## 2019-01-17 PROCEDURE — 85610 PROTHROMBIN TIME: CPT | Performed by: STUDENT IN AN ORGANIZED HEALTH CARE EDUCATION/TRAINING PROGRAM

## 2019-01-17 PROCEDURE — A9270 NON-COVERED ITEM OR SERVICE: HCPCS | Mod: GY | Performed by: STUDENT IN AN ORGANIZED HEALTH CARE EDUCATION/TRAINING PROGRAM

## 2019-01-17 PROCEDURE — 25000128 H RX IP 250 OP 636: Performed by: STUDENT IN AN ORGANIZED HEALTH CARE EDUCATION/TRAINING PROGRAM

## 2019-01-17 PROCEDURE — 25000132 ZZH RX MED GY IP 250 OP 250 PS 637: Mod: GY | Performed by: STUDENT IN AN ORGANIZED HEALTH CARE EDUCATION/TRAINING PROGRAM

## 2019-01-17 PROCEDURE — 40000893 ZZH STATISTIC PT IP EVAL DEFER

## 2019-01-17 PROCEDURE — 83735 ASSAY OF MAGNESIUM: CPT | Performed by: STUDENT IN AN ORGANIZED HEALTH CARE EDUCATION/TRAINING PROGRAM

## 2019-01-17 PROCEDURE — 25000132 ZZH RX MED GY IP 250 OP 250 PS 637: Mod: GY | Performed by: HOSPITALIST

## 2019-01-17 PROCEDURE — 80048 BASIC METABOLIC PNL TOTAL CA: CPT | Performed by: STUDENT IN AN ORGANIZED HEALTH CARE EDUCATION/TRAINING PROGRAM

## 2019-01-17 PROCEDURE — 85652 RBC SED RATE AUTOMATED: CPT | Performed by: STUDENT IN AN ORGANIZED HEALTH CARE EDUCATION/TRAINING PROGRAM

## 2019-01-17 PROCEDURE — 86140 C-REACTIVE PROTEIN: CPT | Performed by: STUDENT IN AN ORGANIZED HEALTH CARE EDUCATION/TRAINING PROGRAM

## 2019-01-17 PROCEDURE — 85027 COMPLETE CBC AUTOMATED: CPT | Performed by: STUDENT IN AN ORGANIZED HEALTH CARE EDUCATION/TRAINING PROGRAM

## 2019-01-17 PROCEDURE — A9270 NON-COVERED ITEM OR SERVICE: HCPCS | Mod: GY | Performed by: HOSPITALIST

## 2019-01-17 PROCEDURE — 25000132 ZZH RX MED GY IP 250 OP 250 PS 637: Mod: GY | Performed by: INTERNAL MEDICINE

## 2019-01-17 PROCEDURE — A9270 NON-COVERED ITEM OR SERVICE: HCPCS | Mod: GY | Performed by: INTERNAL MEDICINE

## 2019-01-17 RX ORDER — SODIUM CHLORIDE, SODIUM LACTATE, POTASSIUM CHLORIDE, CALCIUM CHLORIDE 600; 310; 30; 20 MG/100ML; MG/100ML; MG/100ML; MG/100ML
INJECTION, SOLUTION INTRAVENOUS
Status: DISCONTINUED
Start: 2019-01-17 | End: 2019-01-17 | Stop reason: HOSPADM

## 2019-01-17 RX ORDER — CHLORAL HYDRATE 500 MG
CAPSULE ORAL 2 TIMES DAILY
Status: ON HOLD | COMMUNITY
End: 2019-01-20

## 2019-01-17 RX ORDER — BISACODYL 10 MG
10 SUPPOSITORY, RECTAL RECTAL DAILY PRN
Status: DISCONTINUED | OUTPATIENT
Start: 2019-01-17 | End: 2019-01-20 | Stop reason: HOSPADM

## 2019-01-17 RX ADMIN — CETIRIZINE HYDROCHLORIDE 10 MG: 10 TABLET, FILM COATED ORAL at 21:36

## 2019-01-17 RX ADMIN — CARVEDILOL 3.12 MG: 3.12 TABLET, FILM COATED ORAL at 09:03

## 2019-01-17 RX ADMIN — POLYETHYLENE GLYCOL 3350 17 G: 17 POWDER, FOR SOLUTION ORAL at 09:04

## 2019-01-17 RX ADMIN — CALCIUM CARBONATE-VITAMIN D TAB 500 MG-200 UNIT 1 TABLET: 500-200 TAB at 09:00

## 2019-01-17 RX ADMIN — HYDRALAZINE HYDROCHLORIDE 10 MG: 10 TABLET, FILM COATED ORAL at 21:36

## 2019-01-17 RX ADMIN — FOLIC ACID 1 MG: 1 TABLET ORAL at 09:02

## 2019-01-17 RX ADMIN — LEVOTHYROXINE SODIUM 75 MCG: 25 TABLET ORAL at 08:57

## 2019-01-17 RX ADMIN — APIXABAN 2.5 MG: 2.5 TABLET, FILM COATED ORAL at 08:59

## 2019-01-17 RX ADMIN — ISOSORBIDE MONONITRATE 30 MG: 30 TABLET, EXTENDED RELEASE ORAL at 08:59

## 2019-01-17 RX ADMIN — CARVEDILOL 3.12 MG: 3.12 TABLET, FILM COATED ORAL at 19:11

## 2019-01-17 RX ADMIN — RANITIDINE 150 MG: 150 TABLET ORAL at 19:12

## 2019-01-17 RX ADMIN — Medication 500 MG: at 09:03

## 2019-01-17 RX ADMIN — HYDRALAZINE HYDROCHLORIDE 10 MG: 10 TABLET, FILM COATED ORAL at 08:59

## 2019-01-17 RX ADMIN — AZATHIOPRINE 50 MG: 50 TABLET ORAL at 19:12

## 2019-01-17 RX ADMIN — HYDRALAZINE HYDROCHLORIDE 10 MG: 10 TABLET, FILM COATED ORAL at 14:16

## 2019-01-17 RX ADMIN — Medication 50 MG: at 19:12

## 2019-01-17 RX ADMIN — SODIUM CHLORIDE, POTASSIUM CHLORIDE, SODIUM LACTATE AND CALCIUM CHLORIDE 250 ML: 600; 310; 30; 20 INJECTION, SOLUTION INTRAVENOUS at 09:24

## 2019-01-17 RX ADMIN — APIXABAN 2.5 MG: 2.5 TABLET, FILM COATED ORAL at 19:12

## 2019-01-17 ASSESSMENT — ACTIVITIES OF DAILY LIVING (ADL)
ADLS_ACUITY_SCORE: 17
ADLS_ACUITY_SCORE: 17
ADLS_ACUITY_SCORE: 19
ADLS_ACUITY_SCORE: 19
ADLS_ACUITY_SCORE: 17
ADLS_ACUITY_SCORE: 19

## 2019-01-17 ASSESSMENT — MIFFLIN-ST. JEOR: SCORE: 1173.42

## 2019-01-17 NOTE — PLAN OF CARE
6C/OT: Cx. Attempted x2 with pt meeting with team on first attempt and on second attempt, pt was with lab and leaving for a procedure. Will reschedule for tomorrow.

## 2019-01-17 NOTE — PROGRESS NOTES
[facility], [place]    Progress Note - Cardiology 1 Service        Date of Admission:  1/15/2019    Assessment & Plan   Linda Spicer is a 87 year old female with PMH of HFpEF, PAF/Aflutter on apixaban s/p multiple cardioversions, tachy lisa s/p PPM 2/17, rheumatoid pulmonary fibrosis, CKD, hypothyroidism, who presents with 1 day of L-sided chest discomfort and chronic SOB w/ exertion.      Changes today:   - TTE yesterday w/ mild MR and EF 60-65%  - Per d/w interventional cardiology, Mitraclip will not be offered at this time; no indication for angio    - Stress TTE   - 6-min walk test  - Pulmonary consulted given severe, persistent sxs and negative cardiac w/u  - NEIL, likely prerenal, fluid challenge and hold Lasix   - Transfer from step-down to med-surg    #Chronic SOB w/ exertion  # L-sided chest discomfort   # Mitral insufficiency (mild-moderate)  Suspect sxs related to HTN and possible pulm edema in the setting of HFpEF; sxs likely exacerbated by underlying pulm fibrosis and general deconditioning; wts stable at 154lb (which is EDW), BNP not elevated above BL, and no signs of volume overload on exam so acute HF exacerbation unlikely; ACS unlikely (trop negative x2, EKG unchanged from prior); L sided chest discomfort sounds non-cardiac (likely MSK); no signs of infection, RA pulm fibrosis flare unlikely (stable CXR, stable dx on last pulm apt in 1/2018); HRs have been well-controlled here so unlikely rate-related   - TTE yesterday w/ mild MR and EF 60-65%  - Per d/w interventional cardiology, Mitraclip will not be offered at this time; no indication for angio    - Stress TTE   - 6-min walk test  - Pulmonary consulted   - PT/OT   - Pulm rehab referral for outpatient     #HTN urgency   BPs intermittently elevated in admission (max 189/96), likely contributing to sxs; hx of poorly-controlled HTN; improved to 130s systolically after adding Imdur 1/16  - Cont hydralazine 10mg  TID  - Cont Imdur 30mg every day    #HFpEF   TTE 1/16 w/ mild MR and EF 60-65%; no signs of acute exacerbation- wts stable at 154lb (which is EDW), BNP not elevated above BL, and no signs of volume overload on exam    - Cont Coreg 3.125mg BID   - Holding Lasix as below    # RA  # Pulmonary fibrosis  # RA-associated ILD  On Orencia 750mg IV every 4 weeks; RA pulm fibrosis flare unlikely (stable CXR, stable dx on last pulm apt in 1/2018)   - Continue azathioprine 50 mg qday  - Pulmonary consulted for SOB given severe, persistent sxs and negative cardiac w/u      #NEIL on CKD  Baseline is 1.3-1.4; stable; present since 2014; underlying etiology unclear (possibly HTN), does not appear to have seen a nephrologist in the past; NEIL developed on 1/17, thinking prerenal etiology in the setting of receiving 40mg IV Lasix in the ED on admission (normally takes 20mg PO BID)  - 250cc LR fluid challenge and hold Lasix      # Paroxysmal Afib s/p multiple cardioversions  # Tachy lisa s/p PPM   [id number] 4. Amiodalone was discontinued in 11/2018. Had cardioversions 9/14/18,10/11/18, 10/16/18; per cardiology notes, EP has stated she is not a candidate for ablation  - Continue apixaban 2.5 mg BID, coreg 3.125 mg BID     # Hypothyroidism  - continue levothyroxine 75 mcg qday     #Hx of recurrent UTIs   - Cont trimeoprim 50mg every day for ppx per urology    #Osteoporosis  - On denosumab Q6mo    #Macular degeneration  - On Lucentis [id number]     Code status: DNR  FEN: 2g Na restricted, 2L fluid restricted   DVT Prophylaxis: on apixaban  Dispo: senior living facility tomorrow w/ home PT/OT    The patient's care was discussed with the Attending Physician, Dr. Simeon.    Mariola Angeles MD  Cardiology 1 Service  [facility] [place]  Pager: [phone]  ______________________________________________________________________    Interval History   Yesterday patient was having a BM when she became lightheaded, BP was in  the 80's systolically, improved to the 100s after resting in bed; orthostatic vitals a while later were negative; NAEO; patient SOB is unchanged, denied chest pain    Data reviewed today: I reviewed all medications, new labs and imaging results over the last 24 hours. I personally reviewed.    Physical Exam   Vital Signs: Temp: 97.5  F (36.4  C) Temp src: Oral BP: 136/76 Pulse: 73 Heart Rate: 69 Resp: 16 SpO2: 97 % O2 Device: None (Room air)    Weight: 154 lbs 8.68 oz  GENERAL APPEARANCE: NAD; on RA  LUNGS: CTA   CARDIOVASCULAR: Heart is with regular rate/rhythm. Mild systolic murmur, no JVD   ABDOMEN: soft and nontender, nondistended, BS+  SKIN: unremarkable; no rash or lesions  MUSCULOSKELETAL: no joint effusions  EXTREMITIES: no LE edema   NEUROLOGIC: alert and oriented, speech fluent; PERRL, EOMI, facial muscles symmetric; no gait abnormalities appreciated.  Psychological: tearful on exam     Data   Recent Labs   Lab 01/17/19  0531 01/16/19  0525 01/15/19  1936   WBC 6.2  --  6.7   HGB 11.4*  --  13.0   *  --  101*     --  283   INR 1.31*  --   --     139 136   POTASSIUM 3.9 3.5 3.7   CHLORIDE 103 102 100   CO2 27 28 28   BUN 48* 36* 33*   CR 1.82* 1.59* 1.51*   ANIONGAP 9 9 8   FATOUMATA 8.5 8.7 9.2   GLC 91 84 90   ALBUMIN  --   --  3.8   PROTTOTAL  --   --  7.8   BILITOTAL  --   --  0.4   ALKPHOS  --   --  59   ALT  --   --  18   AST  --   --  22   TROPI  --  0.024 0.015     No results found for this or any previous visit (from the past 24 hour(s)).  Medications     - MEDICATION INSTRUCTIONS -       - MEDICATION INSTRUCTIONS -         apixaban ANTICOAGULANT  2.5 mg Oral BID     azaTHIOprine  50 mg Oral Daily     calcium carbonate-vitamin D  1 tablet Oral Daily     carvedilol  3.125 mg Oral BID w/meals     cetirizine  10 mg Oral At Bedtime     folic acid  1 mg Oral Daily     hydrALAZINE  10 mg Oral TID     isosorbide mononitrate  30 mg Oral Daily     lactated ringers         levothyroxine  75 mcg  Oral Daily     polyethylene glycol  17 g Oral Daily     ranitidine  150 mg Oral Daily     trimethoprim  50 mg Oral Daily     vitamin C  500 mg Oral Daily

## 2019-01-17 NOTE — PROGRESS NOTES
Care Coordinator Progress Note    Admission Date/Time:  1/15/2019  Attending MD:  Susi Simeon MD    Data  Chart reviewed, discussed with interdisciplinary team.   Patient was admitted for:    Acute on chronic congestive heart failure, unspecified heart failure type (H)  ILD (interstitial lung disease) (H).    Concerns with insurance coverage for discharge needs: None stated by pt.  Current Living Situation: Patient lives alone in an independent living apt at [facility].   Support System: Supportive and Involved adult children.   Services Involved: outpt physical therapy at [facility] (Ph: [phone]).   Transportation at Discharge: Family or friend will provide  Transportation to Medical Appointments:   - Name of caregiver: daughter, Toya Guzman.   Barriers to Discharge:  Medical condition.     Coordination of Care and Referrals: Provided patient/family with options for home care.    Assessment  PT/OT recommending home care; pt and pt's daughter are in agreement with this plan and want to use [facility].   Intervention:      Arrangements made with [facility] (Ph: [phone] Fax: [phone]) for RN eval post hospitalization, assess vital signs, respiratory and cardiac status, activity tolerance, hydration, nutritional status, med set up and management, heart failure education reinforcement.HHA for assist with ADL's. PT/OT eval and treat for deconditioning, strengthening, and endurance.   Plan  Anticipated Discharge Date:  1/20/19  Anticipated Discharge Plan:  Discharge to home with home care.     VICKI MITCHELL RN BSN  Care Coordinator Unit   [phone]

## 2019-01-17 NOTE — PLAN OF CARE
Pt with hx of mitral insufficiency and HF and some SOB to have angiogram for w/up of mitraclip tomorrow.Sats good on RA though pt has pulmonary fibrosis. Paced 70s with some intrinsic Afib. VSS.

## 2019-01-17 NOTE — PLAN OF CARE
PT 6D:  Acknowledge new consult for 6 minute walk test.  OT is is following pt for pulmonary/cardiac rehab and will address this as indicated.  PT will remain signed off.

## 2019-01-17 NOTE — CONSULTS
[facility]    Pulmonary, Allergy, Critical Care and Sleep Medicine    Initial Consultation  01/17/2019    Linda Spicer MRN# [id number]   Age: 87 year old YOB: 1931     Date of Admission: 1/15/2019  Reason for Consultation: dyspnea on exertion  Requesting Team: cardiology    Primary care provider: Tre Lockett     Assessment and Recommendations:    Linda Spicer is a 87 year old female with a history of rheumatoid arthritis with RA-ILD, heart failure with preserved EF, paroxysmal atrial fibrillation and atrial flutter s/p multiple cardioversions on apixaban, tachy-lisa syndrome s/p pacemaker placement, hypothyroidism, and CKD who presented on 1/15/2019 with left sided chest pain and shortness of breath on exertion.       1. Dyspnea on exertion, RA-ILD: at this point, we feel fluid overload may be the most likely cause of her dyspnea, however she has a longer standing progressive dyspnea which remains unexplained. Given her general level of fatigue and hair loss, and history of hypothyroidism, it is worthwhile to check thyroid studies. Her mild anemia could be contributing as well, and folate and B12 (given macrocytosis) may be helpful to check. Regarding her RA-ILD, we do not expect that this has changed, given unchanged chest xray and negative inflammatory markers. A viral infection could cause her presentation, but with negative inflammatory markers and the prolonged time course, seems less likely. She also may just be at a time where oxygen therapy is needed with exertion, and this should be checked. Lastly, depression and apathy from her recent illnesses, decreased functional status, and loss of her , may be contributing to her picture, and she does admit that she feels like a burden to her family.   -Check TSH, free T4 (ordered)  -Check B12, folate (ordered)  -Check ambulatory O2 saturation to assess need of oxygen with exertion  -Continue imuran for  ILD  -Albuterol PRN  -Trend inflammatory markers  -Could consider increasing rate on pacemaker, to see if resting rate of 80 could improve energy level and fatigue  -Consider checking respiratory viral panel, but lack of symptoms and normal inflammatory markers make infection less likely   -Consider CT chest non-contrast next week, if symptoms do not improve with further optimization of cardiac status.     Seen and discussed with Dr Jeffry Bhatt MD  Pulmonary and Critical Care Fellow   Pager [phone]    Chief Complaint and History of Present Illness:    CC:  Chest pain    HPI:   Ms. Spicer is a 87 year old female with a history of rheumatoid arthritis with RA-ILD, heart failure with preserved EF, paroxysmal atrial fibrillation and atrial flutter s/p multiple cardioversions on apixaban, tachy-lisa syndrome s/p pacemaker placement, hypothyroidism, and CKD who presented on 1/15/2019 with left sided chest pain and shortness of breath on exertion. She had been feeling poorly for sometime, really at least since 2018. Around then, she fell while exercising and sprained her jaw and broke her tailbone. Ever sense then, she has felt short of breaht, which has progressed. Afterward, she had an episode of hip bursitis, and in September, her  . With all of this, shortness of breath continued to progress. In Oct/Nov, she was hospitalized due to this, which has now recurred a few times. Of note, looking back to last year, she already had complaints of progressive dyspnea (prior to July).     She presented to her rheumatologist on day of admission, and looked very short of breath, and complained of left chest pain. She was sent to the hospital. She has also had decreasing exercise tolerance, now having difficulty walking across a room due to shortness of breath. She also gets out of breath while talking. Her chest pain was pressure-like and located on the left chest. This pain comes on with  "exertion, and improves with rest. She had no orthopnea, fevers, or cough. At presentation, she was hypertensive, and BNP was elevated. She was given lasix and admitted. Since admission, the cardiology team has been working on improved blood pressure control. They evaluated her mitral valve, which was no worse than prior. Pulmonary was consulted due to ongoing shortness of breath.     Currently, she continues to feel quite short of breath. She is chilled, but states this is chronic. She denies rhinorrhea, nasal congestion, or sore throat. She has had LE edema, but this is pretty stable from baseline. She also notes a 4 pound weight gain since October. Her appetite is normal. She was previously quite active, but has not been able to maintain activity level due to dyspnea. She previously has required oxygen in the past, when she used to travel to [place] and live in a mobile home. She would always get hypoxic while there and need oxygen, which would resolve quickly when she returned to [place]. Due to this, she eventually sold her home in [place] in about 2013, and hasn't been back. She hasn't needed oxygen at home since then. Note notes that she feels like she is \"slowly dying\" and that no one can figure out why. She worries she is a burden to her family.     Ms. Spicer was last seen in pulmonary clinic in January 2018 by Dr. Comer. At that time, her breathing was stable, but she had been noted to have increased work of breathing with talking. As her RA-ILD had been stable for years, Dr. Comer believed that her dyspnea was due to fatigue associated with blood pressure medications, rather than progression of underlying lung disease, especially as oxygen saturations were stable. At that time, PFTs were stable from prior. In December 2018, she had PFTs again, which did show a reduction in her FVC and FEV1, however, but both were within the range of prior values she has had.     Review of Systems:  Complete 12 " point ROS negative unless mentioned in HPI  Histories, Prior to Admission Medications, Allergies:    Past Medical History:  Past Medical History:   Diagnosis Date     Adjustment disorder with anxious mood 5/18/2015     Advanced directives, counseling/discussion 8/30/2012    Patient states has Advance Directive and will bring in a copy to clinic. 8/30/2012   [facility] \       Anemia 9/25/2015     Basal cell cancer      CHF (congestive heart failure) (H) 9/18/2014     CKD (chronic kidney disease) stage 3, GFR 30-59 ml/min (H) 9/29/2015     DDD (degenerative disc disease), lumbar 9/25/2015     Diffuse idiopathic pulmonary fibrosis (H) 5/6/2013     Encounter for palliative care 5/18/2015     History of blood transfusion 9/29/2015     Hypertension goal BP (blood pressure) < 140/90 9/7/2012     Hypothyroid 9/7/2012     Irritable bowel syndrome 10/29/2013     Macular degeneration      Macular degeneration, 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     Nondisplaced spiral fracture of shaft of humerus      Osteoporosis 8/13/2013     Imo Update utility     RA (rheumatoid arthritis) (H) 5/7/2013     Rheumatic fever      Shingles      Spinal stenosis of lumbar region with neurogenic claudication 9/14/2015       Past Surgical History:  Past Surgical History:   Procedure Laterality Date     ANESTHESIA CARDIOVERSION N/A 9/14/2018    Procedure: ANESTHESIA CARDIOVERSION;;  Surgeon: GENERIC ANESTHESIA PROVIDER;  Location: [facility] OR     ANESTHESIA CARDIOVERSION N/A 10/11/2018    Procedure: ANESTHESIA CARDIOVERSION;  Cardioversion;  Surgeon: GENERIC ANESTHESIA PROVIDER;  Location: [facility] OR     ANESTHESIA CARDIOVERSION N/A 10/16/2018    Procedure: Anesthesia Cardioversion ;  Surgeon: GENERIC ANESTHESIA PROVIDER;  Location: [facility] OR     APPENDECTOMY       BIOPSY      hemorrhoidectomy     ENT SURGERY      tonsillectomy     GYN SURGERY      3 D & C's     HYSTERECTOMY, PAP NO LONGER INDICATED       LAMINECTOMY LUMBAR ONE LEVEL N/A 10/13/2015     Procedure: LAMINECTOMY LUMBAR ONE LEVEL;  Surgeon: Fransico Toussaint MD;  [facility]       Past Social History:  Social History     Socioeconomic History     Marital status:      Spouse name: Not on file     Number of children: Not on file     Years of education: Not on file     Highest education level: Not on file   Social Needs     Financial resource strain: Not on file     Food insecurity - worry: Not on file     Food insecurity - inability: Not on file     Transportation needs - medical: Not on file     Transportation needs - non-medical: Not on file   Occupational History     Not on file   Tobacco Use     Smoking status: Never Smoker     Smokeless tobacco: Never Used   Substance and Sexual Activity     Alcohol use: Yes     Comment: rare wine      Drug use: No     Sexual activity: No   Other Topics Concern     Parent/sibling w/ CABG, MI or angioplasty before 65F 55M? No   Social History Narrative    . Has six children. She enjoys [employer].  passed away.  Her son has financial and alcohol issues.   Previously worked as a seamstress.     Family History:  Family History   Problem Relation Age of Onset     Hypertension Mother      Psychotic Disorder Father      Diabetes Son      Diabetes Daughter      Blood Disease Daughter      Medications:    apixaban ANTICOAGULANT  2.5 mg Oral BID     azaTHIOprine  50 mg Oral Daily     calcium carbonate-vitamin D  1 tablet Oral Daily     carvedilol  3.125 mg Oral BID w/meals     cetirizine  10 mg Oral At Bedtime     folic acid  1 mg Oral Daily     hydrALAZINE  10 mg Oral TID     isosorbide mononitrate  30 mg Oral Daily     lactated ringers         levothyroxine  75 mcg Oral Daily     polyethylene glycol  17 g Oral Daily     ranitidine  150 mg Oral Daily     trimethoprim  50 mg Oral Daily     vitamin C  500 mg Oral Daily     - MEDICATION INSTRUCTIONS -, acetaminophen, albuterol, bisacodyl, Carboxymethylcellulose Sod PF, - MEDICATION  INSTRUCTIONS -, HOLD MEDICATION, hypromellose-dextran, senna-docusate    Allergies:     Allergies   Allergen Reactions     Cephalexin Hcl Diarrhea     Gabapentin Other (See Comments)     Dizzsiness     Naproxen GI Disturbance     Perfume      Macrobid [Nitrofurantoin Anhydrous]      Possibly related to lung disease      Seasonal Allergies      Sulfa Drugs      Throat swelling     Xanax [Alprazolam] Other (See Comments)     Dizziness      Ciprofloxacin Itching and Rash       Physical Exam:    Temp:  [97.5  F (36.4  C)-98.6  F (37  C)] 97.5  F (36.4  C)  Pulse:  [73] 73  Heart Rate:  [69-71] 69  Resp:  [16-18] 16  BP: (113-136)/(52-76) 125/61  SpO2:  [95 %-97 %] 97 %    Intake/Output Summary (Last 24 hours) at 1/17/2019 1453  Last data filed at 1/17/2019 1300  Gross per 24 hour   Intake 540 ml   Output 650 ml   Net -110 ml      General: laying in bed, fatigued appearing.   HEENT: anicteric, moist mucosa. Hair loss noted (chronic and unchanged, per patient).   Neck: no palpable lymphadenopathy  Chest: decreased at bilateral bases, with mild bibasilar crackles. Normal WOB. SpO2 99% on room air at rest, but dropped to 92% with talking.   Cardiac: paced, regular rate. No murmurs  Abdomen: Soft, flat, non tender, active BS  Extremities: trace LE Edema  Neuro: A&Ox3, mild right facial droop (may be chronic, per patient)   Skin: no rash noted    Laboratory, imaging, and microbiologic data:    All laboratory and imaging data reviewed.    Notable for:   K 4.1 Cr 1.83 from 1.82 from 1.51 at admit.   CRP negative  ESR 25  INR 1.31  BNP 2153  LFTs normal.     VBG on room air 7.39/50/30/30    WBC 6.2 Hgb 11.4, plts 272.      1/16 Echo: Global and regional left ventricular function is normal with an EF of 60-65%. Right ventricular function, chamber size, wall motion, and thickness are normal. The inferior vena cava is normal.  No pericardial effusion is present.     CXR 1/15: 1. Chronic changes of ILD are not significantly changed  from the prior chest x-ray. No superimposed focal opacity. 2. Mild pulmonary vascular congestion.

## 2019-01-17 NOTE — PROGRESS NOTES
Spoke with son Arjun regarding that his mom, the patient, has been admitted to the hospital.  Will follow up with the admitting team, and structural heart team regarding the next steps for this patient.

## 2019-01-18 ENCOUNTER — APPOINTMENT (OUTPATIENT)
Dept: OCCUPATIONAL THERAPY | Facility: CLINIC | Age: 84
DRG: 291 | End: 2019-01-18
Payer: MEDICARE

## 2019-01-18 ENCOUNTER — APPOINTMENT (OUTPATIENT)
Dept: CT IMAGING | Facility: CLINIC | Age: 84
DRG: 291 | End: 2019-01-18
Payer: MEDICARE

## 2019-01-18 LAB
ALBUMIN UR-MCNC: NEGATIVE MG/DL
ANION GAP SERPL CALCULATED.3IONS-SCNC: 7 MMOL/L (ref 3–14)
APPEARANCE UR: CLEAR
BILIRUB UR QL STRIP: NEGATIVE
BUN SERPL-MCNC: 46 MG/DL (ref 7–30)
CALCIUM SERPL-MCNC: 8.6 MG/DL (ref 8.5–10.1)
CHLORIDE SERPL-SCNC: 104 MMOL/L (ref 94–109)
CO2 SERPL-SCNC: 27 MMOL/L (ref 20–32)
COLOR UR AUTO: YELLOW
CREAT SERPL-MCNC: 1.55 MG/DL (ref 0.52–1.04)
ERYTHROCYTE [DISTWIDTH] IN BLOOD BY AUTOMATED COUNT: 13.4 % (ref 10–15)
FOLATE SERPL-MCNC: 57.8 NG/ML
GFR SERPL CREATININE-BSD FRML MDRD: 30 ML/MIN/{1.73_M2}
GLUCOSE SERPL-MCNC: 85 MG/DL (ref 70–99)
GLUCOSE UR STRIP-MCNC: NEGATIVE MG/DL
HCT VFR BLD AUTO: 35.3 % (ref 35–47)
HGB BLD-MCNC: 11.3 G/DL (ref 11.7–15.7)
HGB UR QL STRIP: NEGATIVE
KETONES UR STRIP-MCNC: NEGATIVE MG/DL
LEUKOCYTE ESTERASE UR QL STRIP: ABNORMAL
MAGNESIUM SERPL-MCNC: 2.1 MG/DL (ref 1.6–2.3)
MCH RBC QN AUTO: 32.7 PG (ref 26.5–33)
MCHC RBC AUTO-ENTMCNC: 32 G/DL (ref 31.5–36.5)
MCV RBC AUTO: 102 FL (ref 78–100)
NITRATE UR QL: NEGATIVE
PH UR STRIP: 7 PH (ref 5–7)
PLATELET # BLD AUTO: 268 10E9/L (ref 150–450)
POTASSIUM SERPL-SCNC: 4.1 MMOL/L (ref 3.4–5.3)
RBC # BLD AUTO: 3.46 10E12/L (ref 3.8–5.2)
RBC #/AREA URNS AUTO: 1 /HPF (ref 0–2)
SODIUM SERPL-SCNC: 138 MMOL/L (ref 133–144)
SOURCE: ABNORMAL
SP GR UR STRIP: 1.01 (ref 1–1.03)
SQUAMOUS #/AREA URNS AUTO: 3 /HPF (ref 0–1)
T4 FREE SERPL-MCNC: 1.4 NG/DL (ref 0.76–1.46)
TRANS CELLS #/AREA URNS HPF: <1 /HPF (ref 0–1)
TSH SERPL DL<=0.005 MIU/L-ACNC: 0.73 MU/L (ref 0.4–4)
UROBILINOGEN UR STRIP-MCNC: NORMAL MG/DL (ref 0–2)
VIT B12 SERPL-MCNC: 636 PG/ML (ref 193–986)
WBC # BLD AUTO: 5.3 10E9/L (ref 4–11)
WBC #/AREA URNS AUTO: 12 /HPF (ref 0–5)

## 2019-01-18 PROCEDURE — 80048 BASIC METABOLIC PNL TOTAL CA: CPT | Performed by: INTERNAL MEDICINE

## 2019-01-18 PROCEDURE — 97535 SELF CARE MNGMENT TRAINING: CPT | Mod: GO

## 2019-01-18 PROCEDURE — 84439 ASSAY OF FREE THYROXINE: CPT | Performed by: INTERNAL MEDICINE

## 2019-01-18 PROCEDURE — A9270 NON-COVERED ITEM OR SERVICE: HCPCS | Mod: GY | Performed by: HOSPITALIST

## 2019-01-18 PROCEDURE — 25000132 ZZH RX MED GY IP 250 OP 250 PS 637: Mod: GY | Performed by: STUDENT IN AN ORGANIZED HEALTH CARE EDUCATION/TRAINING PROGRAM

## 2019-01-18 PROCEDURE — 70450 CT HEAD/BRAIN W/O DYE: CPT

## 2019-01-18 PROCEDURE — 99222 1ST HOSP IP/OBS MODERATE 55: CPT | Performed by: NURSE PRACTITIONER

## 2019-01-18 PROCEDURE — 81001 URINALYSIS AUTO W/SCOPE: CPT | Performed by: HOSPITALIST

## 2019-01-18 PROCEDURE — 25000131 ZZH RX MED GY IP 250 OP 636 PS 637: Mod: GY | Performed by: STUDENT IN AN ORGANIZED HEALTH CARE EDUCATION/TRAINING PROGRAM

## 2019-01-18 PROCEDURE — 12000001 ZZH R&B MED SURG/OB UMMC

## 2019-01-18 PROCEDURE — A9270 NON-COVERED ITEM OR SERVICE: HCPCS | Mod: GY | Performed by: STUDENT IN AN ORGANIZED HEALTH CARE EDUCATION/TRAINING PROGRAM

## 2019-01-18 PROCEDURE — 25000132 ZZH RX MED GY IP 250 OP 250 PS 637: Mod: GY | Performed by: INTERNAL MEDICINE

## 2019-01-18 PROCEDURE — 83735 ASSAY OF MAGNESIUM: CPT | Performed by: INTERNAL MEDICINE

## 2019-01-18 PROCEDURE — 99207 ZZC CDG-CODE INCORRECT PER BILLING BASED ON TIME: CPT | Performed by: NURSE PRACTITIONER

## 2019-01-18 PROCEDURE — 84443 ASSAY THYROID STIM HORMONE: CPT | Performed by: INTERNAL MEDICINE

## 2019-01-18 PROCEDURE — 99233 SBSQ HOSP IP/OBS HIGH 50: CPT | Performed by: INTERNAL MEDICINE

## 2019-01-18 PROCEDURE — 25000132 ZZH RX MED GY IP 250 OP 250 PS 637: Mod: GY | Performed by: NURSE PRACTITIONER

## 2019-01-18 PROCEDURE — 82607 VITAMIN B-12: CPT | Performed by: INTERNAL MEDICINE

## 2019-01-18 PROCEDURE — A9270 NON-COVERED ITEM OR SERVICE: HCPCS | Mod: GY | Performed by: NURSE PRACTITIONER

## 2019-01-18 PROCEDURE — 82746 ASSAY OF FOLIC ACID SERUM: CPT | Performed by: INTERNAL MEDICINE

## 2019-01-18 PROCEDURE — 85027 COMPLETE CBC AUTOMATED: CPT | Performed by: INTERNAL MEDICINE

## 2019-01-18 PROCEDURE — 36415 COLL VENOUS BLD VENIPUNCTURE: CPT | Performed by: INTERNAL MEDICINE

## 2019-01-18 PROCEDURE — 25000132 ZZH RX MED GY IP 250 OP 250 PS 637: Mod: GY | Performed by: HOSPITALIST

## 2019-01-18 PROCEDURE — 87086 URINE CULTURE/COLONY COUNT: CPT | Performed by: HOSPITALIST

## 2019-01-18 PROCEDURE — A9270 NON-COVERED ITEM OR SERVICE: HCPCS | Mod: GY | Performed by: INTERNAL MEDICINE

## 2019-01-18 PROCEDURE — 40000133 ZZH STATISTIC OT WARD VISIT

## 2019-01-18 PROCEDURE — 97110 THERAPEUTIC EXERCISES: CPT | Mod: GO

## 2019-01-18 RX ORDER — FUROSEMIDE 20 MG
20 TABLET ORAL DAILY
Status: DISCONTINUED | OUTPATIENT
Start: 2019-01-19 | End: 2019-01-19

## 2019-01-18 RX ORDER — MORPHINE SULFATE 100 MG/5ML
2.5 SOLUTION ORAL EVERY 6 HOURS PRN
Status: DISCONTINUED | OUTPATIENT
Start: 2019-01-18 | End: 2019-01-20 | Stop reason: HOSPADM

## 2019-01-18 RX ORDER — DOCUSATE SODIUM 100 MG/1
100 CAPSULE, LIQUID FILLED ORAL 2 TIMES DAILY
Status: DISCONTINUED | OUTPATIENT
Start: 2019-01-18 | End: 2019-01-20 | Stop reason: HOSPADM

## 2019-01-18 RX ORDER — AMOXICILLIN 250 MG
2 CAPSULE ORAL 2 TIMES DAILY PRN
Status: DISCONTINUED | OUTPATIENT
Start: 2019-01-18 | End: 2019-01-20 | Stop reason: HOSPADM

## 2019-01-18 RX ORDER — ISOSORBIDE MONONITRATE 30 MG/1
60 TABLET, EXTENDED RELEASE ORAL DAILY
Status: DISCONTINUED | OUTPATIENT
Start: 2019-01-19 | End: 2019-01-20 | Stop reason: HOSPADM

## 2019-01-18 RX ORDER — AMOXICILLIN 250 MG
1 CAPSULE ORAL 2 TIMES DAILY PRN
Status: DISCONTINUED | OUTPATIENT
Start: 2019-01-18 | End: 2019-01-20 | Stop reason: HOSPADM

## 2019-01-18 RX ADMIN — CALCIUM CARBONATE-VITAMIN D TAB 500 MG-200 UNIT 1 TABLET: 500-200 TAB at 08:41

## 2019-01-18 RX ADMIN — CARVEDILOL 3.12 MG: 3.12 TABLET, FILM COATED ORAL at 08:41

## 2019-01-18 RX ADMIN — RANITIDINE 150 MG: 150 TABLET ORAL at 18:17

## 2019-01-18 RX ADMIN — CETIRIZINE HYDROCHLORIDE 10 MG: 10 TABLET, FILM COATED ORAL at 21:26

## 2019-01-18 RX ADMIN — POLYETHYLENE GLYCOL 3350 17 G: 17 POWDER, FOR SOLUTION ORAL at 08:43

## 2019-01-18 RX ADMIN — FOLIC ACID 1 MG: 1 TABLET ORAL at 08:41

## 2019-01-18 RX ADMIN — Medication 12.5 MG: at 19:38

## 2019-01-18 RX ADMIN — Medication 500 MG: at 08:41

## 2019-01-18 RX ADMIN — APIXABAN 2.5 MG: 2.5 TABLET, FILM COATED ORAL at 08:42

## 2019-01-18 RX ADMIN — AZATHIOPRINE 50 MG: 50 TABLET ORAL at 18:17

## 2019-01-18 RX ADMIN — MORPHINE SULFATE 2.6 MG: 100 SOLUTION ORAL at 18:16

## 2019-01-18 RX ADMIN — LEVOTHYROXINE SODIUM 75 MCG: 25 TABLET ORAL at 08:40

## 2019-01-18 RX ADMIN — HYDRALAZINE HYDROCHLORIDE 10 MG: 10 TABLET, FILM COATED ORAL at 13:05

## 2019-01-18 RX ADMIN — ISOSORBIDE MONONITRATE 30 MG: 30 TABLET, EXTENDED RELEASE ORAL at 08:43

## 2019-01-18 RX ADMIN — APIXABAN 2.5 MG: 2.5 TABLET, FILM COATED ORAL at 18:16

## 2019-01-18 RX ADMIN — HYDRALAZINE HYDROCHLORIDE 10 MG: 10 TABLET, FILM COATED ORAL at 08:42

## 2019-01-18 RX ADMIN — CARVEDILOL 3.12 MG: 3.12 TABLET, FILM COATED ORAL at 21:26

## 2019-01-18 RX ADMIN — Medication 50 MG: at 18:16

## 2019-01-18 RX ADMIN — DOCUSATE SODIUM 100 MG: 100 CAPSULE, LIQUID FILLED ORAL at 19:38

## 2019-01-18 ASSESSMENT — ACTIVITIES OF DAILY LIVING (ADL)
ADLS_ACUITY_SCORE: 17
ADLS_ACUITY_SCORE: 19
ADLS_ACUITY_SCORE: 17
ADLS_ACUITY_SCORE: 19
ADLS_ACUITY_SCORE: 17
ADLS_ACUITY_SCORE: 17

## 2019-01-18 ASSESSMENT — MIFFLIN-ST. JEOR: SCORE: 1148.48

## 2019-01-18 NOTE — PROGRESS NOTES
Social Work Services Progress Note    Hospital Day: 4  Date of Initial Social Work Evaluation:    Collaborated with:  Patient, son, daughter, facility    Data:  Linda is an 87 year old female per cards team notified sw today that patient ready to discharge to TCU. sw seeking placement but anticipate discharge Monday.    Intervention:  Referrals pending to:   [facility]: Faxed referral/ [facility]: Faxed referral/[facility]  [facility]: no beds  [facility]: back up referral sent 3: 30 PM after further family discussion  [facility]: back up referral sent 3: 30 PM after further family discussion  [facility]: back up referral sent 3: 30 PM after further family discussion-- they called back this afternoon stating they might be able to take Saturday, needs Saturday AM follow up. Weekend sw will call    Assessment: ready to discharge to TCU once placement found    Plan:    Anticipated Disposition:  Facility:  TCU    Barriers to d/c plan:  Anticipate Monday    Follow Up:  sw following    JAQUELINE Carlin  5B  (Medical/Surgical)  Phone: [phone]  Pager: [phone]

## 2019-01-18 NOTE — PROGRESS NOTES
[facility]   Cardiology   Progress Note     Interval History:  - no acute events overnight  - patient continues to c/o SOB, VSS  - unable to do cardiac stress due to SOB     Physical Exam:  Temp:  [96.5  F (35.8  C)-97.8  F (36.6  C)] 96.7  F (35.9  C)  Pulse:  [70-72] 70  Heart Rate:  [71] 71  Resp:  [16-18] 18  BP: (122-149)/(62-91) 144/78  SpO2:  [96 %-98 %] 98 %      Wt:   Wt Readings from Last 5 Encounters:   01/17/19 75.3 kg (166 lb 1.6 oz)   01/15/19 73.9 kg (163 lb)   01/05/19 73.9 kg (163 lb)   01/03/19 73.9 kg (163 lb)   12/27/18 73.9 kg (162 lb 14.4 oz)       General: NAD  HEENT:  PERRLA, EOMI. Sclera is non-icteric and conjunctiva is pink with no erythema. Oral mucosa moist, no oral lesions, good dentition.  Neck: JVD flat. No cervical/supraclavicular LAD.   CV: RRR. No murmur appreciated. No rubs or gallops. Peripheral radial pulse intact.  Resp: No increased work of breathing or use of accessory muscles, breathing comfortably on room air.  Lung sounds clear throughout/bilaterally  Abdomen:  Normal active bowel sounds.  Abdomen is soft. No distension, non-tender to palpation.   :  No suprapubic tenderness.  Extremities: Warm. Capillary refill less than 3 sec. 2/4 radial pulses bilaterally.  2/4 pedal pulses bilaterally. No pre-tibial edema. No cyanosis or clubbing.  Skin:  Warm and dry. No erythema, rashes, ulceration or diaphoresis.  Neuro: CN II-XII grossly intact. Alert and oriented x3.  Moving all extremities equally.    Medications:    apixaban ANTICOAGULANT  2.5 mg Oral BID     azaTHIOprine  50 mg Oral Daily     calcium carbonate-vitamin D  1 tablet Oral Daily     carvedilol  3.125 mg Oral BID w/meals     cetirizine  10 mg Oral At Bedtime     folic acid  1 mg Oral Daily     [START ON 1/19/2019] furosemide  20 mg Oral Daily     hydrALAZINE  12.5 mg Oral TID     [START ON 1/19/2019] isosorbide mononitrate  60 mg Oral Daily     levothyroxine  75 mcg Oral Daily      polyethylene glycol  17 g Oral Daily     ranitidine  150 mg Oral Daily     trimethoprim  50 mg Oral Daily     vitamin C  500 mg Oral Daily       - MEDICATION INSTRUCTIONS -       - MEDICATION INSTRUCTIONS -         Labs:   CMP  Recent Labs   Lab 01/18/19 0447 01/17/19  1419 01/17/19  0531 01/16/19  0525 01/15/19  1936    138 139 139 136   POTASSIUM 4.1 4.1 3.9 3.5 3.7   CHLORIDE 104 102 103 102 100   CO2 27 29 27 28 28   ANIONGAP 7 6 9 9 8   GLC 85 126* 91 84 90   BUN 46* 46* 48* 36* 33*   CR 1.55* 1.83* 1.82* 1.59* 1.51*   GFRESTIMATED 30* 24* 24* 29* 31*   GFRESTBLACK 34* 28* 28* 33* 36*   FATOUMATA 8.6 8.7 8.5 8.7 9.2   MAG 2.1  --  2.3 1.9  --    PROTTOTAL  --   --   --   --  7.8   ALBUMIN  --   --   --   --  3.8   BILITOTAL  --   --   --   --  0.4   ALKPHOS  --   --   --   --  59   AST  --   --   --   --  22   ALT  --   --   --   --  18     CBC  Recent Labs   Lab 01/18/19 0447 01/17/19  0531 01/15/19  1936   WBC 5.3 6.2 6.7   RBC 3.46* 3.45* 3.88   HGB 11.3* 11.4* 13.0   HCT 35.3 34.8* 39.1   * 101* 101*   MCH 32.7 33.0 33.5*   MCHC 32.0 32.8 33.2   RDW 13.4 13.3 13.0    272 283     INR  Recent Labs   Lab 01/17/19  0531   INR 1.31*     Arterial Blood Gas  Recent Labs   Lab 01/15/19  1937   O2PER ROOM AIR       Diagnostics:  ECG:      Echo 1/16/19  Interpretation Summary  Global and regional left ventricular function is normal with an EF of 60-65%.  Right ventricular function, chamber size, wall motion, and thickness are  normal.  The inferior vena cava is normal.  No pericardial effusion is present.    Left Ventricle  Global and regional left ventricular function is normal with an EF of 60-65%.     Right Ventricle  Right ventricular function, chamber size, wall motion, and thickness are  normal.     Atria  Moderate biatrial enlargement is present.    Mitral Valve  Mild mitral insufficiency is present.     Aortic Valve  Trace to mild aortic insufficiency is present.     Tricuspid Valve  Trace to  mild tricuspid insufficiency is present. The right ventricular  systolic pressure is approximated at 31.1 mmHg plus the right atrial pressure.     Pulmonic Valve  The pulmonic valve is normal. Trace pulmonic insufficiency is present.     Vessels  The inferior vena cava is normal.     Pericardium  No pericardial effusion is present. Prominent epicardial fat is noted.    ASSESSMENT/PLAN:  Linda Spicer is a 87 year old female with PMH of HFpEF, PAF/Aflutter on apixaban s/p multiple cardioversions, tachy lisa s/p PPM 2/17, rheumatoid pulmonary fibrosis, CKD, hypothyroidism, who presents with 1 day of L-sided chest discomfort and chronic SOB w/ exertion.    # Mitral insufficiency (mild-moderate)  #Acute on chronic diastolic heart failure  Patient currently admitted with SOB and left sided chest pain.  Patient reports these issues have been ongoing for several months.  The has been working with valve clinic for possible work up of Mitral Clip. In ED patient with chest xray concerning for pulmonary congestion, given IV Lasix with good response. Wts stable at 154lb (which is EDW), BNP not elevated above BL, and no signs of volume overload on exam so acute HF exacerbation unlikely; ACS unlikely (trop negative x2, EKG unchanged from prior); L sided chest discomfort sounds non-cardiac (likely MSK); no signs of infection. Repeat TTE with mild MR, EF 60-65%.       - Per d/w interventional cardiology, will repeat YOLANDA as OP to re-evaluate need for Mitral Clip  - Increase PTA Hydralazine to 12.5 mg TID as well as Imdur to 60 mg daily   - Continue PTA Coreg 3.125mg BID  - Likely restart PTA Lasix tomorrow    #Anxiety  Patient and family voicing increased stress and anxiety after death of patient's .  With improvement of mitral regurgitation, stable ILD,  stable vital signs, believe SOB may be largely driven by anxiety  - Palliative Care consulted for any further recs on chronic SOB symptoms  - Trial 2 mg Morphine Elixir  q6h prn for SOB   - Patient may benefit from Psych consult; today patient voicing that she is feeling overwhelmed with number of teams involved in her care, possible consult Monday or refer to psych as outpatient    #HTN   BPs intermittently elevated in admission (max 189/96), likely contributing to sxs; hx of poorly-controlled HTN; improved to 130s systolically after adding Imdur 1/16  - Increase hydralazine as above  -increase Imdur as above     # RA  # Pulmonary fibrosis  # RA-associated ILD  On Orencia 750mg IV every 4 weeks; RA pulm fibrosis flare unlikely (stable CXR, stable dx on last pulm apt in 1/2018)   - Continue azathioprine 50 mg qday  - Pulmonary consulted for SOB given severe, persistent sxs and negative cardiac w/u; also feel that SOB not pulm related, ILD not contributing to SOB     # NEIL on CKD  Baseline is 1.3-1.4; stable; present since 2014; underlying etiology unclear (possibly HTN), does not appear to have seen a nephrologist in the past; NEIL developed on 1/17, thinking prerenal etiology in the setting of receiving 40mg IV Lasix in the ED on admission (normally takes 20mg PO BID). S/p 250 mL fluid bolus. Cr today 1.55  - Continue to monitor daily BMP  - Will need to restart Lasix, possibly tomorrow     # Paroxysmal Afib s/p multiple cardioversions  # Tachy lisa s/p PPM   [id number] 4. Amiodalone was discontinued in 11/2018. Had cardioversions 9/14/18,10/11/18, 10/16/18; per cardiology notes, EP has stated she is not a candidate for ablation.   - Continue PTA apixaban 2.5 mg BID, coreg 3.125 mg BID     # Hypothyroidism  - continue levothyroxine 75 mcg qday     # Hx of recurrent UTIs   Patient with reports of urinary burning, afebrile. UA sent - for infection  - Cont trimeoprim 50mg every day for ppx per urology     # Osteoporosis  - On denosumab Q6mo     # Macular degeneration  - On Lucentis [id number]       FEN: 2g Na restriction, 2L fluid restriction  Code status: SNR  Disposition: Likely TCU  Monday pending bed status    Patient seen and discussed with Dr. Simeon, who agrees with above plan.    Jessica Tapia, APRN, CNP  [facility] Cardiology Team  [phone]

## 2019-01-18 NOTE — PLAN OF CARE
VSS on RA. A&Ox4. Denies pain. Appetite is intact. Denies SOB at rest but presents w/ exertional dyspnea. Up in chair most of shift, tolerating SBA w/ walker. Reinforced teaching on importance of calling before getting up. Complained of possible UTI, UA collected and sent to lab. Plan is to continue to monitor pt until TCU placement is established. Will continue to monitor and follow POC.

## 2019-01-18 NOTE — PLAN OF CARE
VSS, paced 70s.  Tele discontinued.  A&Ox4, on RA.  Pt SOB at rest and complains of intermittent L sided chest discomfort.  Pt declines pain meds.  Stress echo not completed this afternoon due to patient condition.  Plan for pt to complete 6 minute walk test tomorrow.  Pt SBA, with a walker.  Pt transferred via wheelchair to  Room 34-2 at 1800.  Report called to  RN.  Pt's daughter, Xi, updated about pt's transfer.  Belongings and chart sent with pt.  Medications from bin sent.

## 2019-01-18 NOTE — PLAN OF CARE
"Discharge Planner OT   Patient plan for discharge: TCU  Current status: Pt with continued decline in functional endurance for ADLs and mobility. Pt CGA for sit <> stands and CGA with FWW for transfers. Pt attempted 6MWT however only able to tolerate 4 min of activity. Pt ambulated 21ft with slowed pace and CGA FWW. Pt on RA, O2 sats 98% HR 70s-80s.   Barriers to return to prior living situation: lives alone in IND apartment, poor functional endurance for ADLs and ambulating household distances   Recommendations for discharge: TCU   Rationale for recommendations: to progress strength and endurance for ADLs and ambulating household distances.        Entered by: Sary Beltran 2019 11:11 AM         SIX MINUTE WALK TEST  Cardiac Rehab  2019    Linda Spicer MRN# [id number]   YOB: 1931 Age: 87 year old     Height: 5' 4\"  Weight (lbs): 166 lbs 1.6 oz Weight (kg): 75.3 kg (actual weight)    Supplemental oxygen during the test: No    Oximetry: finger probe  Gait Aid: FWW     Pre-test Post-test   Time 10:38 10:42   Blood Pressure (mm Hg)     Heart rate (bpm) 78 72   Rated Perceived Dyspnea (Shadia Scale) 2 8   Rated Perceived Exertion (Shadia Scale) 2 8   SpO2 (%) 98 98     Total distance walked in 6 minutes: 21 feet    Paused during test?Yes  Number of pauses: 2  Total Time stopped: 15 seconds, 10 seconds     Stopped test before 6 minutes? Yes  Time completed: 3 minutes, 10 seconds   Distance: 21  Reason: shortness of breath, fatigue, headache     Did the patient experience any pain or discomfort during the test?Yes   Pain Ratin/10  Pain Description: headache  Comments: resolved once seated    Oxygen Titration Required: No  Resting oxygen requirement: 0 Liters on None  Exercise oxygen requirement: 0 Liters on None    Performing Staff: Sary Beltran OT                   "

## 2019-01-18 NOTE — CONSULTS
[facility], [place]    Palliative Care Consultation Note    Patient: Linda Spicer  Date of Admission:  1/15/2019    Requesting provider/team: Maci wise  Reason for consult: Symptom management  Decisional support  Patient and family support    Recommendations: Patient seen and examined with family members present.  Reviewed slowly progressive nature of her heart failure and difficulty in managing symptoms associated with it.  She endorsed it was difficult to get dressed in the morning because of shortness of breath and on most days recently has difficulty with meal preparation secondary to the same.  -Would recommend palliative care outpatient follow-up for symptom management and goals of care conversation.  Family indicates patient's needs currently met in the core clinic.  Discussed recent increase in hospitalizations as a possible indication of worsening heart failure and the consideration of a more palliative approach.  -Recommend low-dose morphine (2.5 mg p.o. every 6 hours as needed shortness of breath)  -Further clarity regarding advanced directives can be pursued next week.  -Family has many questions regarding mitral valve procedure (mitral clip).  These were deferred to cardiology.  -PT/OT should guide patient and family regarding energy conservation techniques.  -Bowel regimen while on low-dose opiates.    These recommendations have been discussed with patient and primary team.    Thank you for the opportunity to participate in the care of this patient and family. Our team will follow. Please feel free to contact the on-call Palliative provider with any urgent needs.     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 Was recently hospitalized in late November-December followed by TCU stay for similar sx. Family notes \"roller coaster\" of fluid balance issues ( volume up--> dehydration) with interventions for each tipping her one way or the other. Last time she had a good day was \" 2 weeks ago.\" Shortness of breath limits activities- including getting dressed and meal preparation some days.   Has been worked up for Mitral valve intervention- Mitraclip- but that is now being re evaluated which concerns family and patient.      Social:  Lives in independent living area of [facility]       Living situation: Some outside support for housekeeping and meals- otherwise independent       Support system: 6 adult children ( 5 in the area) 10 grandchildren, 19 great grandchildren       Functional status: activity tolerance limited by dyspnea.        Hobbies: very socially involved. Computer savvy for 86 yo. Reads and listens to books on tape- up to 3 per week. Puzzles and card games- especially bridge.   Coping: Spouse of 68 years passed away in 9/2018 after a brief cancer illness  Advance Care Planning: DNR documented in record.   History of Present Illness   Sources of History:patient, electronic health record and patient's children    ROS:  A comprehensive ROS has been negative other than stated in the HPI and below:  Palliative Symptom Review (0=no symptom/no concern, 1=mild, 2=moderate, 3=severe):  Pain: 0-1  Fatigue: 1-2  Nausea: 0  Constipation: 1  Diarrhea: 0  Depressive Symptoms: 0-1  Anxiety: 0-1  Drowsiness: 0  Poor Appetite: 0-1  Shortness of Breath: 2  Insomnia:0-1     Past Medical History:   Past Medical History:   Diagnosis Date     Adjustment disorder with anxious mood 5/18/2015 "     Advanced directives, counseling/discussion 8/30/2012    Patient states has Advance Directive and will bring in a copy to clinic. 8/30/2012   Tevin [facility] Medical Assistant \       Anemia 9/25/2015     Basal cell cancer      CHF (congestive heart failure) (H) 9/18/2014     CKD (chronic kidney disease) stage 3, GFR 30-59 ml/min (H) 9/29/2015     DDD (degenerative disc disease), lumbar 9/25/2015     Diffuse idiopathic pulmonary fibrosis (H) 5/6/2013     Encounter for palliative care 5/18/2015     History of blood transfusion 9/29/2015     Hypertension goal BP (blood pressure) < 140/90 9/7/2012     Hypothyroid 9/7/2012     Irritable bowel syndrome 10/29/2013     Macular degeneration      Macular degeneration, 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     Nondisplaced spiral fracture of shaft of humerus      Osteoporosis 8/13/2013     Imo Update utility     RA (rheumatoid arthritis) (H) 5/7/2013     Rheumatic fever      Shingles      Spinal stenosis of lumbar region with neurogenic claudication 9/14/2015          Past Surgical History:   Past Surgical History:   Procedure Laterality Date     ANESTHESIA CARDIOVERSION N/A 9/14/2018    Procedure: ANESTHESIA CARDIOVERSION;;  Surgeon: GENERIC ANESTHESIA PROVIDER;  Location: [facility] OR     ANESTHESIA CARDIOVERSION N/A 10/11/2018    Procedure: ANESTHESIA CARDIOVERSION;  Cardioversion;  Surgeon: GENERIC ANESTHESIA PROVIDER;  Location: [facility] OR     ANESTHESIA CARDIOVERSION N/A 10/16/2018    Procedure: Anesthesia Cardioversion ;  Surgeon: GENERIC ANESTHESIA PROVIDER;  Location: [facility] OR     APPENDECTOMY       BIOPSY      hemorrhoidectomy     ENT SURGERY      tonsillectomy     GYN SURGERY      3 D & C's     HYSTERECTOMY, PAP NO LONGER INDICATED       LAMINECTOMY LUMBAR ONE LEVEL N/A 10/13/2015    Procedure: LAMINECTOMY LUMBAR ONE LEVEL;  Surgeon: Fransico Toussaint MD;  Location: [facility] OR             Family History:   Family History   Problem Relation Age of Onset     Hypertension Mother       Psychotic Disorder Father      Diabetes Son      Diabetes Daughter      Blood Disease Daughter      Family history reviewed and discussed briefly       Allergies:   Allergies   Allergen Reactions     Cephalexin Hcl Diarrhea     Gabapentin Other (See Comments)     Dizzsiness     Naproxen GI Disturbance     Perfume      Macrobid [Nitrofurantoin Anhydrous]      Possibly related to lung disease      Seasonal Allergies      Sulfa Drugs      Throat swelling     Xanax [Alprazolam] Other (See Comments)     Dizziness      Ciprofloxacin Itching and Rash            Medications:   I have reviewed this patient's medication profile and medications given in the past 24 hours.             Physical Exam:   Vital Signs: Temp: 96.7  F (35.9  C) Temp src: Oral BP: 144/78 Pulse: 70 Heart Rate: 71 Resp: 18 SpO2: 98 % O2 Device: None (Room air)    Weight: 166 lbs 1.6 oz    Physical Exam: General appearance-pleasant woman lying in hospital bed no acute distress speaks in short sentences and phrases.  Skin is warm and dry.  HEENT: EOMI, symmetric features.  Tongue midline.  Mucous membranes moist.  Lungs: Clear on anterior exam without wheeze.  Heart: S1-S2 without murmur.  Pulse regular at exam resting in the 70s.  Abdomen: Diffusely soft and nontender with diminished bowel sounds.  No focal findings or discomfort.  Extremities: Symmetric for tone and strength.  Trace to 1+ bilateral lower extremity edema.   strength symmetric.  No resting tremor.  Peripheral pulses intact.  Neuro: Not observed ambulating.  Alert and oriented x4.  Conversant and pleasant.       Data reviewed:      ROUTINE LABS (Last four results)  BMP  Recent Labs   Lab 01/18/19 0447 01/17/19  1419 01/17/19  0531 01/16/19  0525    138 139 139   POTASSIUM 4.1 4.1 3.9 3.5   CHLORIDE 104 102 103 102   FATOUMATA 8.6 8.7 8.5 8.7   CO2 27 29 27 28   BUN 46* 46* 48* 36*   CR 1.55* 1.83* 1.82* 1.59*   GLC 85 126* 91 84     CBC  Recent Labs   Lab 01/18/19 0447 01/17/19  0531  01/15/19  1936   WBC 5.3 6.2 6.7   RBC 3.46* 3.45* 3.88   HGB 11.3* 11.4* 13.0   HCT 35.3 34.8* 39.1   * 101* 101*   MCH 32.7 33.0 33.5*   MCHC 32.0 32.8 33.2   RDW 13.4 13.3 13.0    272 283     INR  Recent Labs   Lab 01/17/19  0531   INR 1.31*           Pedro Luis Mills NP  Pager: [phone]  [facility] Inpatient Team Consult pager [phone] (M-F 8-4:30)  After-hours Answering Service [phone]  Palliative Clinic: [phone]     Total time spent was 50 minutes,  >50% of time was spent counseling and/or coordination of care.

## 2019-01-18 NOTE — PLAN OF CARE
No respiratory issues during the night. Oxygenation is 97% on room air. Some RUSSELL while walking to bathroom, but not severe and recovers quickly. Mobility is SBA with walker, and bed mobility is independent. Fluid restriction is 2L/day and patient did not drink even 100 ml during the night. Urinated twice, not measured. No BM. Patient will do a 6 minute walk with OT and have a pulmonary consult today. If all is well the plan is for her to discharge. Continue with current plan of nursing care, and update MD with concerns as needed.

## 2019-01-18 NOTE — PLAN OF CARE
Pt alert and oriented x4. VSS on RA. On continuous pulse ox.  Pt V paced. BM x1 this evening. SBA with walker to the bathroom. States having intermittent discomfort on L side of chest. Denies wanting pain medication. Plan for 6 minute walk test with OT tomorrow. Will continue to monitor.

## 2019-01-18 NOTE — PLAN OF CARE
D: Pt stable and comfortable. No pain or shortness of breath noted. Pt able to sleep well overnight without complaint. Pt states that she would like to have a BM but has been having difficulty. We discussed requesting a suppository in the AM if she isn't able to have a BM after breakfast.   I: Monitored/assessed pt. Assisted with cares.  A: Pt stable and comfortable.  P: Continue to monitor/assess pt, contact provider with concerns.

## 2019-01-18 NOTE — PROGRESS NOTES
[facility] Call Center    Phone Message    May a detailed message be left on voicemail: yes    Reason for Call: Patient's son called in to talk to Zehra Carrie. He says this is extremely urgent, asked writer for Zehra's cell phone number, claims she gave it to him but can't find it. Son, Arjun, says there is some miscommunication going on and it will severely affect his mother's care if it doesn't get straightened out ASAP. Arjun told writer about Zehra getting pt approved for a mitro clip. Linda is currently in the hospital, the doctors there have told her this morning that they are discharging her today, there is nothing else they can do for her, and recommending her to palliative care. This has upset the patient immensely.     Arjun states Zehra was trying to set up an angiogram for pt while she was still in the hospital, but if they are discharging her, that is not going to happen. Arjun wants a call back ASAP to put the breaks on pt's hospital discharge so they can indeed still have an angiogram and eventually the mitro clip. Please have any nurse available call Arjun if Zehra is not in/going to be in soon. Arjun OK with Dr. Carrasquillo, Dr. Watson, or Rochelle's nurses. Arjun says call him at [phone].  Thank you!    Action Taken: Message routed to:  [facility] ([facility]): Cardiology

## 2019-01-18 NOTE — PROGRESS NOTES
"[facility]    Pulmonary, Allergy, Critical Care and Sleep Medicine    Follow-up Note  01/18/2019    Assessment and Recommendations:    Linda Spicer is a 87 year old female with a history of rheumatoid arthritis with RA-ILD, heart failure with preserved EF, paroxysmal atrial fibrillation and atrial flutter s/p multiple cardioversions on apixaban, tachy-lisa syndrome s/p pacemaker placement, hypothyroidism, and CKD who presented on 1/15/2019 with left sided chest pain and shortness of breath on exertion.       1. Dyspnea on exertion, RA-ILD: at this point, we feel fluid overload may be the most likely cause of her dyspnea, however she has a longer standing progressive dyspnea which remains unexplained. Regarding her RA-ILD, we do not expect that this has changed, given unchanged chest xray and negative inflammatory markers. A viral infection could cause her presentation, but with negative inflammatory markers and the prolonged time course, seems less likely. Lastly, depression and apathy from her recent illnesses, decreased functional status, and loss of her , may be contributing to her picture, and she does admit that she feels like a burden to her family. Did not need O2 on walk, but only went 21 feet. \"Good days\" in past few weeks would argue against a truly progressive process. She feels that she may not always be paced at the rate set by pacemaker.   -Continue imuran for ILD and RA  -Albuterol PRN  -Trend inflammatory markers  -Could consider increasing rate on pacemaker, to see if resting rate of 80 could improve energy level and fatigue (tried increasing from 60 to 70 without benefit)  -would recommend pacemaker interrogation, as she feels that it may not be capturing all the time.   -consider psychiatry consult  -would get CT head given dizziness. (ordered)    Pulmonary will sign off, please call if more questions.      Seen and discussed with Dr Jeffry CHILD " "MD Miller  Pulmonary and Critical Care Fellow   Pager [phone]     Subjective, Interval history:   Feeling quite weak and fatigued. Walked today with OT, but was only able to go 21 feet in 3 minutes and 10 seconds, due to fatigue, shortness of breath, and headache. She did not desaturate. She feels dizzy currently, endorsing a feeling of disequilibrium, not vertigo. She denies cough, fever, or chills. Her appetite is normal. She had some dysuria, and thought she might have a UTI. She has been having normal bowel movements. She notes that two weeks ago or so, she had \"a couple good days\" where she was able to walk and get around normally without this shortness of breath and fatigue. She also notes that cardiology tried increasing her pacemaker rate from 60 to 70, but this didn't improve her symptoms. She does remember that a few times after this change the monitors have read less than 70 beats per minute, however.   Objective:   Medications:    apixaban ANTICOAGULANT  2.5 mg Oral BID     azaTHIOprine  50 mg Oral Daily     calcium carbonate-vitamin D  1 tablet Oral Daily     carvedilol  3.125 mg Oral BID w/meals     cetirizine  10 mg Oral At Bedtime     folic acid  1 mg Oral Daily     hydrALAZINE  10 mg Oral TID     isosorbide mononitrate  30 mg Oral Daily     levothyroxine  75 mcg Oral Daily     polyethylene glycol  17 g Oral Daily     ranitidine  150 mg Oral Daily     trimethoprim  50 mg Oral Daily     vitamin C  500 mg Oral Daily     - MEDICATION INSTRUCTIONS -, acetaminophen, albuterol, bisacodyl, Carboxymethylcellulose Sod PF, - MEDICATION INSTRUCTIONS -, HOLD MEDICATION, hypromellose-dextran, senna-docusate    Physical Exam:  Temp:  [96.5  F (35.8  C)-98.2  F (36.8  C)] 96.7  F (35.9  C)  Pulse:  [70-72] 70  Heart Rate:  [69-71] 71  Resp:  [16-18] 18  BP: (113-149)/(61-91) 144/78  SpO2:  [96 %-98 %] 98 %    Intake/Output Summary (Last 24 hours) at 1/18/2019 0873  Last data filed at 1/17/2019 2300  Gross " per 24 hour   Intake 440 ml   Output 400 ml   Net 40 ml     General: Sitting in chair, fatigued appearing. Closing eyes frequently.   HEENT: anicteric, moist mucosa. Chronic hair loss  Chest: decreased breath sounds at bilateral bases, mild bibasilar crackles. Normal WOB. On room air  Cardiac: paced, regular rate. No murmurs  Abdomen: Soft, flat, non tender, active BS  Extremities: trace LE Edema  Neuro: A&Ox3  Skin: no rash noted     Labs and imaging: All laboratory and imaging data personally reviewed.    Notable for:  K 4.1 Cr 1.55 from 1.83  Folate 57.8  B12 636  TSH 0.73, FT4 1.4    WBC 5.3 Hgb 11.3 plts 268.    UA with large LE, 12 WBC, 1 RBC. Negative nitrite. Urine culture pending.

## 2019-01-19 LAB
ANION GAP SERPL CALCULATED.3IONS-SCNC: 7 MMOL/L (ref 3–14)
BACTERIA SPEC CULT: NO GROWTH
BUN SERPL-MCNC: 41 MG/DL (ref 7–30)
CALCIUM SERPL-MCNC: 8.2 MG/DL (ref 8.5–10.1)
CHLORIDE SERPL-SCNC: 106 MMOL/L (ref 94–109)
CO2 SERPL-SCNC: 26 MMOL/L (ref 20–32)
CREAT SERPL-MCNC: 1.51 MG/DL (ref 0.52–1.04)
GFR SERPL CREATININE-BSD FRML MDRD: 31 ML/MIN/{1.73_M2}
GLUCOSE SERPL-MCNC: 86 MG/DL (ref 70–99)
Lab: NORMAL
MAGNESIUM SERPL-MCNC: 2.1 MG/DL (ref 1.6–2.3)
POTASSIUM SERPL-SCNC: 4.3 MMOL/L (ref 3.4–5.3)
SODIUM SERPL-SCNC: 139 MMOL/L (ref 133–144)
SPECIMEN SOURCE: NORMAL

## 2019-01-19 PROCEDURE — A9270 NON-COVERED ITEM OR SERVICE: HCPCS | Mod: GY | Performed by: STUDENT IN AN ORGANIZED HEALTH CARE EDUCATION/TRAINING PROGRAM

## 2019-01-19 PROCEDURE — A9270 NON-COVERED ITEM OR SERVICE: HCPCS | Mod: GY | Performed by: NURSE PRACTITIONER

## 2019-01-19 PROCEDURE — 25000132 ZZH RX MED GY IP 250 OP 250 PS 637: Mod: GY | Performed by: STUDENT IN AN ORGANIZED HEALTH CARE EDUCATION/TRAINING PROGRAM

## 2019-01-19 PROCEDURE — 25000131 ZZH RX MED GY IP 250 OP 636 PS 637: Mod: GY | Performed by: STUDENT IN AN ORGANIZED HEALTH CARE EDUCATION/TRAINING PROGRAM

## 2019-01-19 PROCEDURE — 80048 BASIC METABOLIC PNL TOTAL CA: CPT | Performed by: INTERNAL MEDICINE

## 2019-01-19 PROCEDURE — 25000132 ZZH RX MED GY IP 250 OP 250 PS 637: Mod: GY | Performed by: NURSE PRACTITIONER

## 2019-01-19 PROCEDURE — 12000001 ZZH R&B MED SURG/OB UMMC

## 2019-01-19 PROCEDURE — 36415 COLL VENOUS BLD VENIPUNCTURE: CPT | Performed by: INTERNAL MEDICINE

## 2019-01-19 PROCEDURE — A9270 NON-COVERED ITEM OR SERVICE: HCPCS | Mod: GY | Performed by: INTERNAL MEDICINE

## 2019-01-19 PROCEDURE — 99233 SBSQ HOSP IP/OBS HIGH 50: CPT | Mod: GC | Performed by: INTERNAL MEDICINE

## 2019-01-19 PROCEDURE — 25000132 ZZH RX MED GY IP 250 OP 250 PS 637: Mod: GY | Performed by: INTERNAL MEDICINE

## 2019-01-19 PROCEDURE — 83735 ASSAY OF MAGNESIUM: CPT | Performed by: INTERNAL MEDICINE

## 2019-01-19 RX ORDER — CHLORAL HYDRATE 500 MG
1 CAPSULE ORAL 2 TIMES DAILY
Status: DISCONTINUED | OUTPATIENT
Start: 2019-01-19 | End: 2019-01-20 | Stop reason: HOSPADM

## 2019-01-19 RX ORDER — FUROSEMIDE 20 MG
20 TABLET ORAL 2 TIMES DAILY
Status: DISCONTINUED | OUTPATIENT
Start: 2019-01-19 | End: 2019-01-20 | Stop reason: HOSPADM

## 2019-01-19 RX ADMIN — LEVOTHYROXINE SODIUM 75 MCG: 25 TABLET ORAL at 06:35

## 2019-01-19 RX ADMIN — APIXABAN 2.5 MG: 2.5 TABLET, FILM COATED ORAL at 18:05

## 2019-01-19 RX ADMIN — Medication 500 MG: at 09:15

## 2019-01-19 RX ADMIN — AZATHIOPRINE 50 MG: 50 TABLET ORAL at 18:05

## 2019-01-19 RX ADMIN — ISOSORBIDE MONONITRATE 60 MG: 30 TABLET, EXTENDED RELEASE ORAL at 09:15

## 2019-01-19 RX ADMIN — RANITIDINE 150 MG: 150 TABLET ORAL at 18:05

## 2019-01-19 RX ADMIN — CALCIUM CARBONATE-VITAMIN D TAB 500 MG-200 UNIT 1 TABLET: 500-200 TAB at 09:16

## 2019-01-19 RX ADMIN — Medication 50 MG: at 18:05

## 2019-01-19 RX ADMIN — FUROSEMIDE 20 MG: 20 TABLET ORAL at 14:10

## 2019-01-19 RX ADMIN — DOCUSATE SODIUM 100 MG: 100 CAPSULE, LIQUID FILLED ORAL at 09:16

## 2019-01-19 RX ADMIN — FOLIC ACID 1 MG: 1 TABLET ORAL at 09:15

## 2019-01-19 RX ADMIN — Medication 1 G: at 20:10

## 2019-01-19 RX ADMIN — CARVEDILOL 3.12 MG: 3.12 TABLET, FILM COATED ORAL at 18:06

## 2019-01-19 RX ADMIN — POLYETHYLENE GLYCOL 3350 17 G: 17 POWDER, FOR SOLUTION ORAL at 09:15

## 2019-01-19 RX ADMIN — Medication 12.5 MG: at 20:10

## 2019-01-19 RX ADMIN — FUROSEMIDE 20 MG: 20 TABLET ORAL at 09:15

## 2019-01-19 RX ADMIN — CETIRIZINE HYDROCHLORIDE 10 MG: 10 TABLET, FILM COATED ORAL at 21:40

## 2019-01-19 RX ADMIN — APIXABAN 2.5 MG: 2.5 TABLET, FILM COATED ORAL at 09:16

## 2019-01-19 RX ADMIN — Medication 12.5 MG: at 09:15

## 2019-01-19 RX ADMIN — Medication 1 G: at 09:16

## 2019-01-19 RX ADMIN — Medication 12.5 MG: at 14:10

## 2019-01-19 RX ADMIN — CARVEDILOL 3.12 MG: 3.12 TABLET, FILM COATED ORAL at 09:16

## 2019-01-19 RX ADMIN — DOCUSATE SODIUM 100 MG: 100 CAPSULE, LIQUID FILLED ORAL at 20:10

## 2019-01-19 ASSESSMENT — ACTIVITIES OF DAILY LIVING (ADL)
ADLS_ACUITY_SCORE: 19

## 2019-01-19 ASSESSMENT — MIFFLIN-ST. JEOR: SCORE: 1154.83

## 2019-01-19 NOTE — PROGRESS NOTES
[facility], [place]    Progress Note - Cardiology 1 Service        Date of Admission:  1/15/2019    Assessment & Plan   Linda Spicer is a 87 year old female with PMH of HFpEF, PAF/Aflutter on apixaban s/p multiple cardioversions, tachy lisa s/p PPM 2/17, rheumatoid pulmonary fibrosis, CKD, hypothyroidism, who presents with 1 day of L-sided chest discomfort and chronic SOB w/ exertion.      Changes today:   - Cont morphine sulfate suspension 2.5mg Q6H PRN  - Resume Lasix 20mg BID     #Chronic SOB w/ exertion  # L-sided chest discomfort   # Mitral insufficiency (mild-moderate)  Suspect sxs related to HTN and possible pulm edema in the setting of HFpEF; sxs likely exacerbated by anxiety and general deconditioning; wts stable at 154lb (which is EDW), BNP not elevated above BL, and no signs of volume overload on exam so acute HF exacerbation unlikely; ACS unlikely (trop negative x2, EKG unchanged from prior); L sided chest discomfort sounds non-cardiac (likely MSK); no signs of infection, RA pulm fibrosis flare unlikely (stable CXR, stable dx on last pulm apt in 1/2018); HRs have been well-controlled here so unlikely rate-related; pulmonology consulted, think sxs related to generalized fatigue (? CNS process, CT non-con head negative 1/18); rather than a pulm cause; 6-min walk test w/ no desats (only walked 20ft); could not tolerate exercise stress TTE 2/2 SOB   - Per d/w interventional cardiology, Mitraclip will not be offered at this time; no indication for angio    - Palliative consulted, recommended trying morphine sulfate suspension 2.5mg Q6H PRN; palliative referral on discharge   - PT/OT   - Pulm rehab referral for outpatient     #HTN urgency   BPs intermittently elevated in admission (max 189/96), likely contributing to sxs; hx of poorly-controlled HTN; improved to 130s systolically after adding Imdur 1/16  - Cont hydralazine 10mg TID  - Cont Imdur 30mg every day    #HFpEF  "  TTE 1/16 w/ mild MR and EF 60-65%; no signs of acute exacerbation- wts stable at 154lb (which is EDW), BNP not elevated above BL, and no signs of volume overload on exam    - Cont Coreg 3.125mg BID   - Holding Lasix as below     # RA  # Pulmonary fibrosis  # RA-associated ILD  On Orencia 750mg IV every 4 weeks; RA pulm fibrosis flare unlikely (stable CXR, stable dx on last pulm apt in 1/2018)   - Continue azathioprine 50 mg qday  - Pulmonary consulted for SOB given severe, persistent sxs and negative cardiac w/u      #NEIL on CKD, resolved   Baseline is 1.3-1.4; stable; present since 2014; underlying etiology unclear (possibly HTN), does not appear to have seen a nephrologist in the past; NEIL developed on 1/17, thinking prerenal etiology in the setting of receiving 40mg IV Lasix in the ED on admission (normally takes 20mg PO BID); Cr 1.51 s/p 250cc LR fluid challenge and holding Lasix   - Resume Lasix 20mg BID       # Paroxysmal Afib s/p multiple cardioversions  # Tachy lisa s/p PPM   SLEH2VILD 4. Amiodarone was discontinued in 11/2018. Had cardioversions 9/14/18,10/11/18, 10/16/18; per cardiology notes, EP has stated she is not a candidate for ablation  - Continue apixaban 2.5 mg BID, coreg 3.125 mg BID     # Hypothyroidism  - continue levothyroxine 75 mcg qday     #Hx of recurrent UTIs   - Cont trimeoprim 50mg every day for ppx per urology    #Osteoporosis  - On denosumab Q6mo    #Macular degeneration  - On Lucentis J5cmiso     Code status: DNR  FEN: 2g Na restricted, 2L fluid restricted   DVT Prophylaxis: on apixaban  Dispo: TCU tomorrow, ride at 11am     The patient's care was discussed with the Attending Physician, Dr. Simeon.    Mariola Angeles MD  Cardiology 1 Service  [facility], [place]  Pager: [phone]  ______________________________________________________________________    Interval History   NAEO; patient endorsed feeling \"dizzy\" w/ SOB w/ minimal exertion including w/ " talking     Data reviewed today: I reviewed all medications, new labs and imaging results over the last 24 hours. I personally reviewed.    Physical Exam   Vital Signs: Temp: 96.5  F (35.8  C) Temp src: Oral BP: 138/72 Pulse: 72 Heart Rate: 70 Resp: 20 SpO2: 96 % O2 Device: None (Room air)    Weight: 160 lbs 9.6 oz  GENERAL APPEARANCE: NAD; on RA; sitting up on the side of the bed eating breakfast   LUNGS: CTA   CARDIOVASCULAR: Heart is with regular rate/rhythm. Mild systolic murmur, no JVD   ABDOMEN: soft and nontender, nondistended, BS+  SKIN: unremarkable; no rash or lesions  MUSCULOSKELETAL: no joint effusions  EXTREMITIES: no LE edema    NEUROLOGIC: alert and oriented, speech fluent; PERRL, EOMI, facial muscles symmetric; no gait abnormalities appreciated.  Psychological: tearful on exam     Data   Recent Labs   Lab 01/19/19  0539 01/18/19  0447 01/17/19  1419 01/17/19  0531 01/16/19  0525 01/15/19  1936   WBC  --  5.3  --  6.2  --  6.7   HGB  --  11.3*  --  11.4*  --  13.0   MCV  --  102*  --  101*  --  101*   PLT  --  268  --  272  --  283   INR  --   --   --  1.31*  --   --     138 138 139 139 136   POTASSIUM 4.3 4.1 4.1 3.9 3.5 3.7   CHLORIDE 106 104 102 103 102 100   CO2 26 27 29 27 28 28   BUN 41* 46* 46* 48* 36* 33*   CR 1.51* 1.55* 1.83* 1.82* 1.59* 1.51*   ANIONGAP 7 7 6 9 9 8   FATOUMATA 8.2* 8.6 8.7 8.5 8.7 9.2   GLC 86 85 126* 91 84 90   ALBUMIN  --   --   --   --   --  3.8   PROTTOTAL  --   --   --   --   --  7.8   BILITOTAL  --   --   --   --   --  0.4   ALKPHOS  --   --   --   --   --  59   ALT  --   --   --   --   --  18   AST  --   --   --   --   --  22   TROPI  --   --   --   --  0.024 0.015     Recent Results (from the past 24 hour(s))   CT Head w/o Contrast    Narrative    CT HEAD W/O CONTRAST 1/18/2019 5:16 PM    Provided History: Vertigo, episodic, peripheral; 87 year old with  extreme fatigue, hx of falls, dsypnea with no hypoxia. Eval for  subdural  ICD-10:    Comparison: Head CT  11/17/2018.    Technique: Using multidetector thin collimation helical acquisition  technique, axial, coronal and sagittal CT images from the skull base  to the vertex were obtained without intravenous contrast.     Findings:    No intracranial hemorrhage, mass effect, or midline shift. The  ventricles are proportionate to the cerebral sulci. Mild leukoaraiosis  and generalized parenchymal volume loss. The gray to white matter  differentiation of the cerebral hemispheres is preserved. The basal  cisterns are patent. Mineralization in the basal ganglia. Vascular  calcifications of the carotid siphons.    The visualized paranasal sinuses are clear. The mastoid air cells are  clear.       Impression    Impression: No acute intracranial pathology.    I have personally reviewed the examination and initial interpretation  and I agree with the findings.    SIMIN DAVID MD     Medications     - MEDICATION INSTRUCTIONS -       - MEDICATION INSTRUCTIONS -         apixaban ANTICOAGULANT  2.5 mg Oral BID     azaTHIOprine  50 mg Oral Daily     calcium carbonate-vitamin D  1 tablet Oral Daily     carvedilol  3.125 mg Oral BID w/meals     cetirizine  10 mg Oral At Bedtime     docusate sodium  100 mg Oral BID     fish oil-omega-3 fatty acids  1 g Oral BID     folic acid  1 mg Oral Daily     furosemide  20 mg Oral Daily     hydrALAZINE  12.5 mg Oral TID     isosorbide mononitrate  60 mg Oral Daily     levothyroxine  75 mcg Oral Daily     polyethylene glycol  17 g Oral Daily     ranitidine  150 mg Oral Daily     trimethoprim  50 mg Oral Daily     UNABLE TO FIND 200 mg  200 mg Both Eyes At Bedtime     vitamin C  500 mg Oral Daily

## 2019-01-19 NOTE — PLAN OF CARE
VSS on RA. A&Ox4. Pt denies SOB at rest but presents w/ exertional dyspnea. Denies pain. Appetite is intact. Family was present at bedside and attentive to pt for most of shift. Pt reported she was too tired to work w/ OT today, but still tolerating SBA well. Plan is to monitor overnight and discharge to TCU tomorrow w/ family. Will continue to monitor and follow POC.

## 2019-01-19 NOTE — PLAN OF CARE
OT/5B: Cancel, pt declines politely, reports she has had visitors all day and has just gotten back to bed. Pt declines therapy tomorrow, stating she is planning to transfer to TCU Sunday.

## 2019-01-19 NOTE — PROGRESS NOTES
Social Work Services Progress Note    Hospital Day: 5  Date of Initial Social Work Evaluation:    Collaborated with:  Patient, TCU Admissions, Dtr, Son by phone    Data:  SW assisting pt with discharge planning    Intervention:  SW reviewed pt chart with TCU referrals showing pending needing follow up.[facility] contacted for update on pt able to discharge to their facility as noted in previous day's SW note. Admissions confirmed pt able to take a shared room with notification that infusion medication not accepted as well as biannual medications. SW confirmed with family that monthly infusion med would be done at [facility] (next dose 1/24) and biannual medication due next dose in May 2019. Both medications identified as cost prohibitive for facility considered resolved issue.    Pt and family agreed that discharge to [facility] 1/20@ 11am will be the plan. Pt family providing transportation at discharge to TCU. Facility accepting of discharge plan.    Assessment:  See RN, PT/OT, medical team notes    Plan:    Anticipated Disposition:  Facility:  [facility]    Barriers to d/c plan:  Barriers resolved    Follow Up:  SW available for discharge needs    Adi SANTOYO  Weekend SW  Pager: [phone]  On Call Pager: [phone] 4pm - Midnight      [id number]

## 2019-01-19 NOTE — PLAN OF CARE
Pt A&O x4. VSS x4. Pt c/o being SOB with activity and talking. Given PRN morphine x1 for SOB. Pt stated she is not sure if it helped or not due to pt resting in chair after doing activity. Pt O2 in high 90's on RA. Pt aslo c/o being dizzy after waking and moving head to the side to fast. Up in chair for most of shift. Good appetite. Up to bathroom SBA with walker. Awaiting TCU placement. Will continue to monitor.

## 2019-01-19 NOTE — PLAN OF CARE
Pt's VSS. Denies pain. Shortness of breath with activity. O2 sats 97 % on room air. Voided X 1. Call light within reach. Pt using call light appropriately. Will continue to monitor and notify MD with change in status.

## 2019-01-20 VITALS
OXYGEN SATURATION: 97 % | BODY MASS INDEX: 27.66 KG/M2 | RESPIRATION RATE: 16 BRPM | DIASTOLIC BLOOD PRESSURE: 52 MMHG | HEART RATE: 67 BPM | WEIGHT: 162 LBS | HEIGHT: 64 IN | TEMPERATURE: 97.2 F | SYSTOLIC BLOOD PRESSURE: 132 MMHG

## 2019-01-20 LAB
ANION GAP SERPL CALCULATED.3IONS-SCNC: 7 MMOL/L (ref 3–14)
BUN SERPL-MCNC: 47 MG/DL (ref 7–30)
CALCIUM SERPL-MCNC: 8.2 MG/DL (ref 8.5–10.1)
CHLORIDE SERPL-SCNC: 106 MMOL/L (ref 94–109)
CO2 SERPL-SCNC: 27 MMOL/L (ref 20–32)
CREAT SERPL-MCNC: 1.79 MG/DL (ref 0.52–1.04)
GFR SERPL CREATININE-BSD FRML MDRD: 25 ML/MIN/{1.73_M2}
GLUCOSE SERPL-MCNC: 86 MG/DL (ref 70–99)
MAGNESIUM SERPL-MCNC: 2.1 MG/DL (ref 1.6–2.3)
POTASSIUM SERPL-SCNC: 4.2 MMOL/L (ref 3.4–5.3)
SODIUM SERPL-SCNC: 140 MMOL/L (ref 133–144)

## 2019-01-20 PROCEDURE — A9270 NON-COVERED ITEM OR SERVICE: HCPCS | Mod: GY | Performed by: NURSE PRACTITIONER

## 2019-01-20 PROCEDURE — 25000132 ZZH RX MED GY IP 250 OP 250 PS 637: Mod: GY | Performed by: STUDENT IN AN ORGANIZED HEALTH CARE EDUCATION/TRAINING PROGRAM

## 2019-01-20 PROCEDURE — 83735 ASSAY OF MAGNESIUM: CPT | Performed by: INTERNAL MEDICINE

## 2019-01-20 PROCEDURE — 25000132 ZZH RX MED GY IP 250 OP 250 PS 637: Mod: GY | Performed by: NURSE PRACTITIONER

## 2019-01-20 PROCEDURE — 99239 HOSP IP/OBS DSCHRG MGMT >30: CPT | Mod: GC | Performed by: INTERNAL MEDICINE

## 2019-01-20 PROCEDURE — A9270 NON-COVERED ITEM OR SERVICE: HCPCS | Mod: GY | Performed by: STUDENT IN AN ORGANIZED HEALTH CARE EDUCATION/TRAINING PROGRAM

## 2019-01-20 PROCEDURE — 80048 BASIC METABOLIC PNL TOTAL CA: CPT | Performed by: INTERNAL MEDICINE

## 2019-01-20 PROCEDURE — 36415 COLL VENOUS BLD VENIPUNCTURE: CPT | Performed by: INTERNAL MEDICINE

## 2019-01-20 RX ORDER — CARVEDILOL 3.12 MG/1
3.12 TABLET ORAL 2 TIMES DAILY WITH MEALS
Qty: 60 TABLET | Refills: 0 | DISCHARGE
Start: 2019-01-20 | End: 2019-11-05

## 2019-01-20 RX ORDER — SACCHAROMYCES BOULARDII 250 MG
250 CAPSULE ORAL DAILY
Qty: 30 CAPSULE | Refills: 0 | DISCHARGE
Start: 2019-01-20 | End: 2019-09-05

## 2019-01-20 RX ORDER — AZATHIOPRINE 50 MG/1
50 TABLET ORAL DAILY
Qty: 30 TABLET | Refills: 0 | DISCHARGE
Start: 2019-01-20 | End: 2019-05-15

## 2019-01-20 RX ORDER — FUROSEMIDE 20 MG
20 TABLET ORAL 2 TIMES DAILY
Qty: 60 TABLET | Refills: 0 | DISCHARGE
Start: 2019-01-20 | End: 2019-02-20

## 2019-01-20 RX ORDER — ALBUTEROL SULFATE 0.83 MG/ML
2.5 SOLUTION RESPIRATORY (INHALATION) EVERY 6 HOURS PRN
Qty: 360 ML | DISCHARGE
Start: 2019-01-20 | End: 2019-09-05

## 2019-01-20 RX ORDER — ACETAMINOPHEN 500 MG
500 TABLET ORAL EVERY 6 HOURS PRN
DISCHARGE
Start: 2019-01-20 | End: 2019-02-20

## 2019-01-20 RX ORDER — CETIRIZINE HYDROCHLORIDE 10 MG/1
10 TABLET ORAL AT BEDTIME
Qty: 30 TABLET | Refills: 0 | Status: ON HOLD | DISCHARGE
Start: 2019-01-20 | End: 2019-10-01

## 2019-01-20 RX ORDER — FOLIC ACID 1 MG/1
1 TABLET ORAL DAILY
Qty: 30 TABLET | Refills: 0 | DISCHARGE
Start: 2019-01-20 | End: 2019-09-05

## 2019-01-20 RX ORDER — CHLORAL HYDRATE 500 MG
1 CAPSULE ORAL 2 TIMES DAILY
DISCHARGE
Start: 2019-01-20 | End: 2019-09-05

## 2019-01-20 RX ORDER — TRIMETHOPRIM 100 MG/1
50 TABLET ORAL DAILY
Qty: 15 TABLET | Refills: 0 | DISCHARGE
Start: 2019-01-20 | End: 2019-02-18

## 2019-01-20 RX ORDER — ASCORBIC ACID 500 MG
500 TABLET ORAL DAILY
Qty: 30 TABLET | Refills: 0 | DISCHARGE
Start: 2019-01-20 | End: 2019-09-05

## 2019-01-20 RX ORDER — NITROGLYCERIN 0.4 MG/1
0.4 TABLET SUBLINGUAL EVERY 5 MIN PRN
Qty: 25 TABLET | Refills: 11 | DISCHARGE
Start: 2019-01-20 | End: 2019-11-14

## 2019-01-20 RX ORDER — CEFDINIR 300 MG/1
300 CAPSULE ORAL DAILY
Qty: 6 CAPSULE | Refills: 0 | DISCHARGE
Start: 2019-01-21 | End: 2019-02-07

## 2019-01-20 RX ORDER — ISOSORBIDE MONONITRATE 60 MG/1
60 TABLET, EXTENDED RELEASE ORAL DAILY
Qty: 30 TABLET | Refills: 0 | DISCHARGE
Start: 2019-01-21 | End: 2019-03-04

## 2019-01-20 RX ORDER — LEVOTHYROXINE SODIUM 75 UG/1
75 TABLET ORAL DAILY
Qty: 30 TABLET | Refills: 0 | DISCHARGE
Start: 2019-01-20 | End: 2019-03-28

## 2019-01-20 RX ORDER — AMOXICILLIN 250 MG
1 CAPSULE ORAL DAILY PRN
Qty: 100 TABLET | DISCHARGE
Start: 2019-01-20 | End: 2019-03-18

## 2019-01-20 RX ORDER — MORPHINE SULFATE 100 MG/5ML
2.5 SOLUTION ORAL EVERY 6 HOURS PRN
Qty: 118 ML | Refills: 0 | Status: SHIPPED | DISCHARGE
Start: 2019-01-20 | End: 2019-02-07

## 2019-01-20 RX ORDER — CARBOXYMETHYLCELLULOSE SODIUM 10 MG/ML
1 GEL OPHTHALMIC DAILY PRN
Qty: 1 BOTTLE | Status: ON HOLD | DISCHARGE
Start: 2019-01-20 | End: 2019-10-01

## 2019-01-20 RX ORDER — CEFDINIR 300 MG/1
300 CAPSULE ORAL DAILY
Status: DISCONTINUED | OUTPATIENT
Start: 2019-01-20 | End: 2019-01-20 | Stop reason: HOSPADM

## 2019-01-20 RX ORDER — HYDRALAZINE HYDROCHLORIDE 25 MG/1
12.5 TABLET, FILM COATED ORAL 3 TIMES DAILY
Qty: 45 TABLET | Refills: 0 | DISCHARGE
Start: 2019-01-20 | End: 2019-02-07

## 2019-01-20 RX ORDER — VIT A/VIT C/VIT E/ZINC/COPPER 4296-226
1 CAPSULE ORAL 2 TIMES DAILY
Qty: 30 CAPSULE | Status: ON HOLD | DISCHARGE
Start: 2019-01-20 | End: 2019-10-01

## 2019-01-20 RX ADMIN — POLYETHYLENE GLYCOL 3350 17 G: 17 POWDER, FOR SOLUTION ORAL at 07:46

## 2019-01-20 RX ADMIN — FUROSEMIDE 20 MG: 20 TABLET ORAL at 07:46

## 2019-01-20 RX ADMIN — Medication 1 G: at 07:46

## 2019-01-20 RX ADMIN — FOLIC ACID 1 MG: 1 TABLET ORAL at 07:46

## 2019-01-20 RX ADMIN — Medication 500 MG: at 07:46

## 2019-01-20 RX ADMIN — Medication 12.5 MG: at 07:46

## 2019-01-20 RX ADMIN — APIXABAN 2.5 MG: 2.5 TABLET, FILM COATED ORAL at 07:44

## 2019-01-20 RX ADMIN — CALCIUM CARBONATE-VITAMIN D TAB 500 MG-200 UNIT 1 TABLET: 500-200 TAB at 07:46

## 2019-01-20 RX ADMIN — ISOSORBIDE MONONITRATE 60 MG: 30 TABLET, EXTENDED RELEASE ORAL at 07:46

## 2019-01-20 RX ADMIN — LEVOTHYROXINE SODIUM 75 MCG: 25 TABLET ORAL at 07:29

## 2019-01-20 RX ADMIN — DOCUSATE SODIUM 100 MG: 100 CAPSULE, LIQUID FILLED ORAL at 07:46

## 2019-01-20 RX ADMIN — CARVEDILOL 3.12 MG: 3.12 TABLET, FILM COATED ORAL at 07:46

## 2019-01-20 ASSESSMENT — ACTIVITIES OF DAILY LIVING (ADL)
ADLS_ACUITY_SCORE: 19

## 2019-01-20 NOTE — PROGRESS NOTES
Social Work Services Discharge Note      Patient Name:  Linda Spicer     Anticipated Discharge Date:  1/20/2019    Discharge Disposition:   TCU:  [facility]   PH: [phone]  Fax: [phone]    Following MD:  Serina 1     Pre-Admission Screening (PAS) online form has been completed.  The Level of Care (LOC) is:  Determined  Confirmation Code is:  [id number]  Patient/caregiver informed of referral to [facility] for Pre-Admission Screening for skilled nursing facility (SNF) placement and to expect a phone call post discharge from SNF.     Additional Services/Equipment Arranged:  NA, family will transport     Patient / Family response to discharge plan:  Agreeable     Persons notified of above discharge plan:  TCU admissions, RN, MD, Pt, family    Staff Discharge Instructions:  Please fax discharge orders and signed hard scripts for any controlled substances.  Please print a packet and send with patient.     Soo Yeon Han, MSW, [facility]  Weekend pager: [phone]

## 2019-01-20 NOTE — PLAN OF CARE
Pt A&O x4. VSS x4. Pt c/o being SOB with activity and talking. Pt O2 in high 90's on RA. Pt continues to be intermittently dizzy. Denied knowing a trigger for the dizziness. Up in chair for most of shift. Good appetite. Up to bathroom SBA with walker. Possible TCU placement tomorrow. Will continue to monitor.

## 2019-01-20 NOTE — PROGRESS NOTES
Pt discharged to TCU this morning. Left unit via wheelchair and transport services at 1100. Daughter awaiting pt, will transport pt to TCU via private car. All belongings accounted for and sent w/ pt. PIV removed, discharge instructions given and all questions addressed. Handoff report given to BRANDI Phoenix at receiving facility and POC included in pt's belongings.

## 2019-01-20 NOTE — PLAN OF CARE
Status: Pt admitted for SOB, acute respiratory failure.     VS: Stable, on RA. Pt known to occasionally get orthostatic hypotensive.   Neuros: A&Ox4, calls appropriately. Intermittent dizziness with sudden movements.   GI: Tolerating regular diet, 2L FR.   : Voiding spontaneously.  ?  IV: PIV SL.   Activity: Up SBA w/ GB and walker.   Pain: Denies. PRN tylenol available. Pt able to sleep in between cares.   Respiratory: LS dim at bases bilaterally. Dyspneic with exertion.   Skin: Intact. BLE edema +2.     Plan of care: Plan for discharge today to TCU at 1100. Transportation being provided by family. Continue to monitor and follow POC.

## 2019-01-21 ENCOUNTER — AMBULATORY - HEALTHEAST (OUTPATIENT)
Dept: ADMINISTRATIVE | Facility: CLINIC | Age: 84
End: 2019-01-21

## 2019-01-21 ENCOUNTER — MEDICAL CORRESPONDENCE (OUTPATIENT)
Dept: HEALTH INFORMATION MANAGEMENT | Facility: CLINIC | Age: 84
End: 2019-01-21

## 2019-01-21 ENCOUNTER — PATIENT OUTREACH (OUTPATIENT)
Dept: CARE COORDINATION | Facility: CLINIC | Age: 84
End: 2019-01-21

## 2019-01-21 ENCOUNTER — CARE COORDINATION (OUTPATIENT)
Dept: CARDIOLOGY | Facility: CLINIC | Age: 84
End: 2019-01-21

## 2019-01-21 ENCOUNTER — TELEPHONE (OUTPATIENT)
Dept: CARDIOLOGY | Facility: CLINIC | Age: 84
End: 2019-01-21

## 2019-01-21 DIAGNOSIS — R93.89 ABNORMAL FINDINGS ON DIAGNOSTIC IMAGING OF CARDIOVASCULAR SYSTEM: ICD-10-CM

## 2019-01-21 DIAGNOSIS — I34.0 MITRAL VALVE INSUFFICIENCY, UNSPECIFIED ETIOLOGY: Primary | ICD-10-CM

## 2019-01-21 ASSESSMENT — ACTIVITIES OF DAILY LIVING (ADL): DEPENDENT_IADLS:: COOKING;CLEANING;LAUNDRY;SHOPPING;MEDICATION MANAGEMENT;MONEY MANAGEMENT;TRANSPORTATION

## 2019-01-21 NOTE — DISCHARGE SUMMARY
[facility], [place]  Discharge Summary - Cardiology 1        Date of Admission:  1/15/2019  Date of Discharge:  1/20/2019 11:06 AM  Discharging Provider: Dr. Simeon   Discharge Service: Cardiology 1     Discharge Diagnoses   1. Mitral Regurgitation (mild)  2. Acute on Chronic Diastolic Heart Failure  3. Hypertension  4. Rheumatoid Arthritis  5. RA-associated ILD  6. Pulmonary fibrosis  7. Paroxysmal atrial fibrillation s/p multiple cardioversions  8. Osteoporosis  9 Macular Degeneration  10. CKD  11. 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      Hospital Course by Diagnosis:  # Mitral insufficiency (mild-moderate)  #Acute on chronic diastolic heart failure  Patient was admitted for SOB and left sided chest pain.  Patient reports these issues have been ongoing for several months; has been working with valve clinic for possible work up of Mitral Clip. In ED patient with chest xray concerning for pulmonary congestion, given IV Lasix with good UOP although sxs unchanged; ACS thought to be unlikely (trop negative x2, EKG unchanged from prior); L sided chest discomfort likely non-cardiac (likely MSK); no signs of infection. TTE this admission with mild MR, EF 60-65%. Ultimate etiology for these sxs ultimately unclear, no hypoxia on 6min, TSH wnl; patient  could not tolerate the stress TTE; pulm consulted, did not think sxs were pulm related; sxs likely 2/2 a combo of HTN in the setting of HFpEF and MR, + deconditioning and anxiety; per d/w EP, no indication for MitraClip; palliative care consulted, recommended that she try morphine suspension for SOB and follow-up w/ them as an outpatient   - Adjusted anti-hypertensive meds as below   - Messaged [facility] to arrange f/u apt for HFpEF   - Patient to f/u w/ outpatient cardiologist and pulmonologist   - Palliative care referral placed    - Pulm rehab referral placed       #HTN   BPs intermittently elevated in admission (max 189/96), possibly contributing to sxs in the setting of HFpEF and MR; hx of poorly-controlled HTN; improved to 130s systolically after adding Imdur 60mg QD; also increased hydralazine from 10mg->12.5mg TID this admission      #CKD  Present since 2014 but has been rising slowly over the past several months; underlying etiology unclear (possibly HTN), does not appear to have seen a nephrologist in the past; Cr here has ranged from 1.5-1.8 (1.79 on discharge)   - TCU physician to check BMP within 1-2 days to monitor kidney function    - Nephrology referral placed for CKD     #Cystitis    #Hx of recurrent UTIs   Patient w/ hx of recurrent UTIs, on trimethoprim ppx per urology; c/o dyuria, UA w/ large leuk esterase, 12 WBC; re-ordered regimen urologist recommended last time her UA was abnormal   - Omnicef 300mg po daily for 7 days  - TCU physician to f/u on urine cx   - Hold trimeoprim 50mg every day while on abx, resume after course   - F/u w/ urology     Consultations This Hospital Stay   VASCULAR ACCESS CARE ADULT IP CONSULT  CARDIAC REHAB IP CONSULT  NUTRITION SERVICES ADULT IP CONSULT  PULMONARY GENERAL ADULT IP CONSULT  PHYSICAL THERAPY ADULT IP CONSULT  OCCUPATIONAL THERAPY ADULT IP CONSULT  PULMONARY GENERAL ADULT IP CONSULT  PALLIATIVE CARE ADULT IP CONSULT  PHYSICAL THERAPY ADULT IP  CONSULT  OCCUPATIONAL THERAPY ADULT IP CONSULT  SMOKING CESSATION PROGRAM IP CONSULT    Code Status   DNR     The patient was discussed with Dr. Cailin Angeles MD  Cardiology 1 Service  [facility] [place]  Pager: [phone]  ______________________________________________________________________    Physical Exam   Vital Signs: Temp: 97.2  F (36.2  C) Temp src: Oral BP: 132/52 Pulse: 67 Heart Rate: 70 Resp: 16 SpO2: 97 % O2 Device: None (Room air)    Weight: 162 lbs 0 oz  GENERAL APPEARANCE: NAD; on RA; sitting in a chair   LUNGS: mild crackles at the bases   CARDIOVASCULAR: Heart is with regular rate/rhythm. Mild systolic murmur, no JVD   ABDOMEN: soft and nontender, nondistended, BS+  SKIN: unremarkable; no rash or lesions  MUSCULOSKELETAL: no joint effusions  EXTREMITIES: no LE edema    NEUROLOGIC: alert and oriented, speech fluent; PERRL, EOMI, facial muscles symmetric; no gait abnormalities appreciated.  Psychological: tearful on exam       Primary Care Physician   Tre Alvarado Vocal    Discharge Disposition   Discharged to rehabilitation facility  Condition at discharge: Stable    Significant Results and Procedures   Results for orders placed or performed during the hospital encounter of 01/15/19   Chest XR,  PA & LAT    Narrative    Exam:  Chest X-ray 1/15/2019 8:24 PM    History: SOB, hx of CHF and ILD, eval for pulmonary edema    Comparison: 11/28/2018    Findings: PA and lateral views of the chest. The chest wall ICD with  the lead tips projecting over the right atrium and right ventricle.  Trachea is midline. Atherosclerotic calcifications of the aortic arch.  Stable enlarged cardiac mediastinal silhouette. Bibasilar reticular  opacities do not appear to be significant changed. No new focal  pulmonary opacity. Mild pulmonary vascular congestion. No  pneumothorax. The visualized upper abdomen is unremarkable.      Impression    Impression:   1. Chronic changes of ILD are  not significantly changed from the prior  chest x-ray. No superimposed focal opacity.  2. Mild pulmonary vascular congestion.    I have personally reviewed the examination and initial interpretation  and I agree with the findings.    DEBRA KOEHLER MD   US Renal Complete w Duplex Complete    Narrative    US RENAL COMPLETE WITH DOPPLER COMPLETE   1/16/2019 11:27 AM      HISTORY: rule out renal artery stenosis    FINDINGS: The right kidney measures 8.7 in length. The left kidney  measures 9.6 in length. The kidneys are normal in size, shape,  position, and echogenicity. There is no hydronephrosis. The bladder is  partially filled and normal.    Grayscale, color, and spectral ultrasound performed. Renal artery  waveforms are normal. Resistive indices are normal on the left and  right ranging  from 0.71-0.76. Renal veins are patent with antegrade  flow.      Impression    IMPRESSION: Normal renal ultrasound. Normal Doppler evaluation of the  kidneys.    DEIRDRE STAHL MD   CT Head w/o Contrast    Narrative    CT HEAD W/O CONTRAST 1/18/2019 5:16 PM    Provided History: Vertigo, episodic, peripheral; 87 year old with  extreme fatigue, hx of falls, dsypnea with no hypoxia. Eval for  subdural  ICD-10:    Comparison: Head CT 11/17/2018.    Technique: Using multidetector thin collimation helical acquisition  technique, axial, coronal and sagittal CT images from the skull base  to the vertex were obtained without intravenous contrast.     Findings:    No intracranial hemorrhage, mass effect, or midline shift. The  ventricles are proportionate to the cerebral sulci. Mild leukoaraiosis  and generalized parenchymal volume loss. The gray to white matter  differentiation of the cerebral hemispheres is preserved. The basal  cisterns are patent. Mineralization in the basal ganglia. Vascular  calcifications of the carotid siphons.    The visualized paranasal sinuses are clear. The mastoid air cells are  clear.       Impression     Impression: No acute intracranial pathology.    I have personally reviewed the examination and initial interpretation  and I agree with the findings.    SIMIN DAVID MD     *Note: Due to a large number of results and/or encounters for the requested time period, some results have not been displayed. A complete set of results can be found in Results Review.       Discharge Orders      Medication Therapy Management Referral      Home care nursing referral      Nephrology Adult Referral      PULMONARY REHAB REFERRAL      Palliative Care Referral      MD face to face encounter    Documentation of Face to Face and Certification for Home Health Services    I certify that patient: Linda Spicer is under my care and that I, or a nurse practitioner or physician's assistant working with me, had a face-to-face encounter that meets the physician face-to-face encounter requirements with this patient on: 1/17/2019.    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    I certify that, based on my findings, the following services are medically necessary home health services: Nursing, Occupational Therapy and Physical Therapy, HHA.    My clinical findings support the need for the above services because: Nurse is needed: to assess  after changes in medications or other medical regimen, to provide assessment and oversight required in the home to assure adherence to the medical plan, to teach and train about the disease and treatments for heart failure. Occupational Therapy Services are needed to assess and treat cognitive ability and address ADL safety. Physical Therapy Services are needed to assess and treat the following functional impairments: deconditioning, strengthening, and endurance. HHA for  assist with ADL's.     Further, I certify that my clinical findings support that this patient is homebound (i.e. absences from home require considerable and taxing effort and are for medical reasons or Sh[employer] services or infrequently or of short duration when for other reasons) because: Requires assistance of another person or specialized equipment to access medical services because patient: Requires supervision of another for safe transfer...    Based on the above findings. I certify that this patient is confined to the home and needs intermittent skilled nursing care, physical therapy and/or speech therapy.  The patient is under my care, and I have initiated the establishment of the plan of care.  This patient will be followed by a physician who will periodically review the plan of care.  Physician/Provider to provide follow up care: Tre Lockett    Attending hospital physician (the Medicare certified PEC provider): Susi Simeon MD  Physician Signature: See electronic signature associated with these discharge orders.  Date: 1/17/2019     General info for SNF    Length of Stay Estimate: Short Term Care: Estimated # of Days <30  Condition at Discharge: Stable  Level of care:skilled   Rehabilitation Potential: Fair  Admission H&P remains valid and up-to-date: Yes  Recent Chemotherapy: N/A  Use Nursing Home Standing Orders: Yes     Mantoux instructions    Give two-step Mantoux (PPD) Per Facility Policy Yes     Reason for your hospital stay    You were admitted with shortness of breath. We did not find anything wrong with your heart or lungs (you have mild mitral insufficiency but this is not causing your symptoms and your symptoms would not be improved with a MitraClip). You also had a UTI.     Daily weights    Call Provider for weight gain of more than 2 pounds per day or 5 pounds per week.     Follow Up and recommended labs and tests    Follow up with custodial physician.  The following  labs/tests are recommended: [facility] to monitor creatinine.     Additional Discharge Instructions    - Take Omnicef for UTI for a total of 7 days (hold your trimethoprim while taking and resume afterwards).  - Take the morphine solution when you feel short of breath.   - Follow up with your cardiologist, [facility], pulmonogist, and palliative care   - We have refferred you to a kidney doctor to keep an eye on your kidney function. We also referred you for pulmonary rehab to help your lungs get stronger.     Activity - Up ad noelle     DNR (Do Not Resuscitate)    No compressions, okay with intubation     Physical Therapy Adult Consult    Evaluate and treat as clinically indicated.    Reason:  Deconditioning from chronic illnesses     Occupational Therapy Adult Consult    Evaluate and treat as clinically indicated.    Reason: Deconditioning from chronic illnesses     Advance Diet as Tolerated    Follow this diet upon discharge: Orders Placed This Encounter      Fluid restriction 2000 ML FLUID      2g sodium restriction     Discharge Medications   Discharge Medication List as of 1/20/2019 10:48 AM      START taking these medications    Details   cefdinir (OMNICEF) 300 MG capsule Take 1 capsule (300 mg) by mouth daily for 6 days, Disp-6 capsule, R-0, Transitional      isosorbide mononitrate (IMDUR) 60 MG 24 hr tablet Take 1 tablet (60 mg) by mouth daily, Disp-30 tablet, R-0, Transitional      morphine sulfate HIGH CONCENTRATE (ROXANOL) 10 mg/0.5 mL (HIGH CONC) solution Take 0.125 mLs (2.5 mg) by mouth every 6 hours as needed for shortness of breath / dyspnea, Disp-118 mL, R-0, Transitional         CONTINUE these medications which have CHANGED    Details   abatacept (ORENCIA) 250 MG injection Inject 750 mg into the vein every 28 days, Disp-30 mL, R-0, Transitional      acetaminophen (TYLENOL) 500 MG tablet Take 1 tablet (500 mg) by mouth every 6 hours as needed for mild pain or fever, Transitional      albuterol (PROVENTIL)  (2.5 MG/3ML) 0.083% neb solution Take 1 vial (2.5 mg) by nebulization every 6 hours as needed for shortness of breath / dyspnea or wheezing, Disp-360 mL, Transitional      apixaban ANTICOAGULANT (ELIQUIS) 2.5 MG tablet Take 1 tablet (2.5 mg) by mouth 2 times daily, Disp-60 tablet, R-0, Transitional      azaTHIOprine (IMURAN) 50 MG tablet Take 1 tablet (50 mg) by mouth daily, Disp-30 tablet, R-0, Transitional      calcium carbonate-vitamin D (OS-FATOUMATA) 500-400 MG-UNIT tablet Take 1 tablet by mouth daily, Disp-30 tablet, R-0, Transitional      Carboxymethylcellulose Sod PF (CELLUVISC/REFRESH LIQUIGEL) 1 % ophthalmic gel Place 1 drop into both eyes daily as needed for dry eyes, Disp-1 Bottle, Transitional      carvedilol (COREG) 3.125 MG tablet Take 1 tablet (3.125 mg) by mouth 2 times daily (with meals), Disp-60 tablet, R-0, Transitional      cetirizine (ZYRTEC ALLERGY) 10 MG tablet Take 1 tablet (10 mg) by mouth At Bedtime, Disp-30 tablet, R-0, Transitional      fish oil-omega-3 fatty acids 1000 MG capsule Take 1 capsule (1 g) by mouth 2 times daily, Transitional      folic acid (FOLVITE) 1 MG tablet Take 1 tablet (1 mg) by mouth daily, Disp-30 tablet, R-0, Transitional      furosemide (LASIX) 20 MG tablet Take 1 tablet (20 mg) by mouth 2 times daily Hold Lasix 12/18 and 12/19, then start 20 mg daily after that., Disp-60 tablet, R-0, Transitional      hydrALAZINE (APRESOLINE) 25 MG tablet Take 0.5 tablets (12.5 mg) by mouth 3 times daily, Disp-45 tablet, R-0, Transitional      hypromellose (GENTEAL) 0.3 % SOLN ophthalmic solution Place 1 drop into both eyes daily as needed for dry eyes, Transitional      levothyroxine (SYNTHROID/LEVOTHROID) 75 MCG tablet Take 1 tablet (75 mcg) by mouth daily, Disp-30 tablet, R-0, Transitional      Multiple Vitamins-Minerals (PRESERVISION AREDS) CAPS Take 1 capsule by mouth 2 times daily, Disp-30 capsule, Transitional      nitroGLYcerin (NITROSTAT) 0.4 MG sublingual tablet Place 1  tablet (0.4 mg) under the tongue every 5 minutes as needed for chest pain, Disp-25 tablet, R-11, Transitional      ranibizumab (LUCENTIS) 0.3 MG/0.05ML SOLN 0.05 mLs (0.3 mg) by Intravitreal route See Admin Instructions Every 6 weeks, Transitional      ranitidine (ZANTAC) 150 MG tablet Take 1 tablet (150 mg) by mouth 2 times daily, Disp-60 tablet, R-0, Transitional      saccharomyces boulardii (FLORASTOR) 250 MG capsule Take 1 capsule (250 mg) by mouth daily, Disp-30 capsule, R-0, Transitional      senna-docusate (SENOKOT-S/PERICOLACE) 8.6-50 MG tablet Take 1 tablet by mouth daily as needed for constipation, Disp-100 tablet, Transitional      trimethoprim (TRIMPEX) 100 MG tablet Take 0.5 tablets (50 mg) by mouth daily, Disp-15 tablet, R-0, TransitionalHold while taking Omnicef for UTI for 7 days, then resume the day after      UNABLE TO FIND Place 200 mg into both eyes At Bedtime MEDICATION NAME: PreserVision AREDS, Transitional      vitamin C (ASCORBIC ACID) 500 MG tablet Take 1 tablet (500 mg) by mouth daily, Disp-30 tablet, R-0, Transitional         CONTINUE these medications which have NOT CHANGED    Details   denosumab (PROLIA) 60 MG/ML SOLN injection Inject 1 mL (60 mg) Subcutaneous every 6 months, Disp-1 mL, TransitionalReportedly last given on 11/26/18. Due for next dose on 5/26/18. Please discuss with endocrinology or primary care prior to administering this dose.      Blood Pressure Monitor KIT 1 kit daily as needed, Disp-1 kit, R-0, E-Prescribe      !! order for DME Dispense SP Walker-smallDisp-1 each, R-0, Local Print      !! order for DME Equipment being ordered: Dispense baffle, for use with nebulizer.Disp-1 each, R-0, Local Print      !! order for DME Equipment being ordered: Nebulizer. Use with Albuterol.Disp-1 each, R-0, Local Print      !! order for DME Equipment being ordered: Dispense face mask.  Mrs. Spicer is immunosuppressed due to rheumatoid arthritis.Disp-1 Box, R-11, Local Print       !! -  Potential duplicate medications found. Please discuss with provider.        Allergies   Allergies   Allergen Reactions     Cephalexin Hcl Diarrhea     Gabapentin Other (See Comments)     Dizzsiness     Naproxen GI Disturbance     Perfume      Macrobid [Nitrofurantoin Anhydrous]      Possibly related to lung disease      Seasonal Allergies      Sulfa Drugs      Throat swelling     Xanax [Alprazolam] Other (See Comments)     Dizziness      Ciprofloxacin Itching and Rash

## 2019-01-21 NOTE — PLAN OF CARE
Occupational Therapy Discharge Summary    Reason for therapy discharge:    Discharged to transitional care facility.    Progress towards therapy goal(s). See goals on Care Plan in [facility] electronic health record for goal details.  Goals partially met.  Barriers to achieving goals:   discharge from facility.    Therapy recommendation(s):    Continued therapy is recommended.  Rationale/Recommendations:  Recommend continued skilled therapy to increase activity tolerance and independence with ADLs.

## 2019-01-21 NOTE — TELEPHONE ENCOUNTER
Called and left message asking son Arjun to call back. Have received forms from [employer] patient assistance foundation regarding her Eliquis. Asked him to call back regarding completing the forms.   BRANDI Berger

## 2019-01-22 ENCOUNTER — OFFICE VISIT - HEALTHEAST (OUTPATIENT)
Dept: GERIATRICS | Facility: CLINIC | Age: 84
End: 2019-01-22

## 2019-01-22 DIAGNOSIS — I49.5 TACHY-BRADY SYNDROME (H): ICD-10-CM

## 2019-01-22 DIAGNOSIS — I48.0 PAF (PAROXYSMAL ATRIAL FIBRILLATION) (H): ICD-10-CM

## 2019-01-22 DIAGNOSIS — I50.43 ACUTE ON CHRONIC COMBINED SYSTOLIC AND DIASTOLIC CHF (CONGESTIVE HEART FAILURE) (H): ICD-10-CM

## 2019-01-22 DIAGNOSIS — I10 ESSENTIAL HYPERTENSION: ICD-10-CM

## 2019-01-22 DIAGNOSIS — N18.9 CHRONIC KIDNEY DISEASE, UNSPECIFIED CKD STAGE: ICD-10-CM

## 2019-01-22 DIAGNOSIS — N39.0 RECURRENT UTI: ICD-10-CM

## 2019-01-22 PROBLEM — I34.0 MITRAL VALVE INSUFFICIENCY, UNSPECIFIED ETIOLOGY: Status: ACTIVE | Noted: 2019-01-22

## 2019-01-22 PROBLEM — R93.89 ABNORMAL FINDINGS ON DIAGNOSTIC IMAGING OF CARDIOVASCULAR SYSTEM: Status: ACTIVE | Noted: 2019-01-22

## 2019-01-22 NOTE — PROGRESS NOTES
Date: 1/22/2019    Time of Call: 11:03 AM     Diagnosis:  Severe mitral regurgitation     [ TORB ] Ordering provider: Avila Watson MD  Order:   Case request for cors, LHC, RHC  YOLANDA  CBC, BMP, INR     Order received by: Gladys Butler RN     Follow-up/additional notes:   This has been requested for follow up after recent admission for HF at [facility], also in work up towards possible TAVR.  Pt's gerald'oswaldo Layne has been called and informed of these appointments.    8:00 am:  Labs ([facility] Waiting Room)  8:30 a.m:  YOLANDA ([facility] Waiting Room)  10:30 a.m:  2A appt (Pre-procedure area prior to angiogram.  [facility] Waiting Room)  12:00 p.m:   Angiogram      Transesophageal Echocardiogram (YOLANDA): January 31, 8:30 a.m  1.  Plan on someone being able to drive you home after the procedure.  2.  Nothing to eat or drink 6 hours before the procedure.  It is ok to take your morning medications with a sip of water.  3.  If you have diabetes, do not take your oral medication in the morning.  4.  Report to the [facility] Waiting room in the hospital 15 minutes before hand.      You are scheduled for a Coronary Angiogram on January 31 at the [facility], [address] [place] [address], [place] [address].     Pre procedure instructions:  1. Please make arrangements to have a  as you will not be allowed to drive following the procedure.  2. Do not eat or drink after midnight the day of your procedure.  **STOP apixaban/Eliquis 2 days prior to procedure.  LAST dose of apixaban/Eliquis will be Monday evening, January 28.  NO apixaban/Eliquis starting Tuesday, January 29.  3. You may take your usual morning medications with a sip of water the morning of your procedure.  4. Take a 325 mg aspirin the day before and the day of your procedure.  5. Make arrangements to have someone stay with you the night after your procedure.      Please call me if you have questions regarding these instructions or your scheduled  procedure.        [facility], [place]  [address] [place] [address]

## 2019-01-23 ASSESSMENT — MIFFLIN-ST. JEOR: SCORE: 1128.96

## 2019-01-23 NOTE — TELEPHONE ENCOUNTER
Called and left a message for patient asking her to call back about forms from [employer].   BRANDI Berger

## 2019-01-24 ENCOUNTER — OFFICE VISIT - HEALTHEAST (OUTPATIENT)
Dept: GERIATRICS | Facility: CLINIC | Age: 84
End: 2019-01-24

## 2019-01-24 ENCOUNTER — TRANSFERRED RECORDS (OUTPATIENT)
Dept: HEALTH INFORMATION MANAGEMENT | Facility: CLINIC | Age: 84
End: 2019-01-24

## 2019-01-24 DIAGNOSIS — J84.10 PULMONARY FIBROSIS (H): ICD-10-CM

## 2019-01-24 DIAGNOSIS — N18.9 CHRONIC KIDNEY DISEASE, UNSPECIFIED CKD STAGE: ICD-10-CM

## 2019-01-24 DIAGNOSIS — I34.0 MITRAL VALVE INSUFFICIENCY, UNSPECIFIED ETIOLOGY: ICD-10-CM

## 2019-01-24 DIAGNOSIS — I50.33 ACUTE ON CHRONIC DIASTOLIC CONGESTIVE HEART FAILURE (H): ICD-10-CM

## 2019-01-24 DIAGNOSIS — I48.0 PAROXYSMAL ATRIAL FIBRILLATION (H): ICD-10-CM

## 2019-01-24 DIAGNOSIS — M06.9 RHEUMATOID ARTHRITIS INVOLVING MULTIPLE SITES, UNSPECIFIED RHEUMATOID FACTOR PRESENCE: ICD-10-CM

## 2019-01-24 DIAGNOSIS — M81.0 OSTEOPOROSIS, UNSPECIFIED OSTEOPOROSIS TYPE, UNSPECIFIED PATHOLOGICAL FRACTURE PRESENCE: ICD-10-CM

## 2019-01-25 ENCOUNTER — PATIENT OUTREACH (OUTPATIENT)
Dept: CARE COORDINATION | Facility: CLINIC | Age: 84
End: 2019-01-25

## 2019-01-25 ASSESSMENT — ACTIVITIES OF DAILY LIVING (ADL): DEPENDENT_IADLS:: COOKING;CLEANING;LAUNDRY;SHOPPING;MEDICATION MANAGEMENT;MONEY MANAGEMENT;TRANSPORTATION

## 2019-01-25 NOTE — PROGRESS NOTES
Clinic Care Coordination Contact  Care Team Conversations    RN CC received message from Valery SANCHEZ with [facility] [phone] with an update that patient will be discharged home from TCU per patient request on 1/26/19.  Patient will receive [facility] SN, PT, OT and HHA services.  Patient was advised to remain for additional therapies, however, patient was anxious to return home.    RN CC will outreach to patient within 1-2 business days of TCU discharge.    Melissa Behl BSN, RN, N  [facility] Care Coordinator  [phone]

## 2019-01-28 ENCOUNTER — TELEPHONE (OUTPATIENT)
Dept: INTERNAL MEDICINE | Facility: CLINIC | Age: 84
End: 2019-01-28

## 2019-01-28 ENCOUNTER — MEDICAL CORRESPONDENCE (OUTPATIENT)
Dept: HEALTH INFORMATION MANAGEMENT | Facility: CLINIC | Age: 84
End: 2019-01-28

## 2019-01-28 ASSESSMENT — ACTIVITIES OF DAILY LIVING (ADL): DEPENDENT_IADLS:: COOKING;CLEANING;LAUNDRY;SHOPPING;MEDICATION MANAGEMENT;MONEY MANAGEMENT;TRANSPORTATION

## 2019-01-28 NOTE — TELEPHONE ENCOUNTER
Notify [facility] that I agree to all home care orders, including medical SW.    Will add Benadryl PRN to medication list, but verify dose and frequency.

## 2019-01-28 NOTE — TELEPHONE ENCOUNTER
Reason for Call:  Other Verbal Orders    Detailed comments: Skilled Nursing 2 times a week for 4 weeks, 1 time a week for 5 weeks. Home Health Aid 2 times a week for 7 weeks. Order for medical Social Worker for [facility] Directive. Dates of Flu shot, Pneumo vaccine and Zoster if any. Also order for Gas X and Benedryl as needed to be added to medication list     Phone Number Patient can be reached at: [phone]    Best Time: Any    Can we leave a detailed message on this number? YES    Call taken on 1/28/2019 at 1:37 PM by Melisa Dunn

## 2019-01-28 NOTE — TELEPHONE ENCOUNTER
Routing to provider to review and advise on home care orders. Patient was recently hospitalized and sent to TCU for rehab, was just discharged back to home on 1/26/19. This qualifies as change of status and needs provider review. RN unable to approve, per policy.    Home are asking for: SN 2 x wk x 4 wks, 1 x wk x 5 wks                                     Home Health Aid 2 x wk x 7 wks                                     Medical SW for [facility]  Directive                                     Gas X and Benadryl PRN to be      added to medications list    Dates of influenza vaccine: 9/27/18                 Pneumococcal 23: 5/1/2001, 10/4/2010                 No record of shingles vaccines

## 2019-01-28 NOTE — PROGRESS NOTES
Clinic Care Coordination Contact  Care Coordination Communication    Referral Source: IP Report    Clinical Data: Patient was hospitalized at [facility] from 1/15/19 to 1/20/19 with diagnosis of mitral valve regurgitation, acute on chronic diastolic heart failure, FA, RA-associated ILD, pulmonary fibrosis and a-fib s/p multiple cardioversions.  Patient was in [facility] TCU 1/20/19-1/26/19.      Home Care Contact:              Home Care Agency: [facility]              Contact name () and phone number: Kaela Jo [phone]              Care Coordination contacted home care: Yes              Anticipated start of care date: this past weekend    Patient Contact:               Introduced self and role of care coordination.               Discharge instructions were reviewed with patient/caregiver.               Do you have any questions about your medications? No, medication reconciliation completed.              Follow up appointment is scheduled for: appointment with specialists scheduled, no PCP appointment scheduled.              Provided 24 Hour Nurse Line and/or 24 Hour Appointment Scheduling: Yes              Home care has contacted patient: Yes              Patient questions/concerns: None    Plan: RN/SW Care Coordinator will await notification from home care staff informing RN/SW Care Coordinator of patients discharge plans/needs. RN/SW Care Coordinator will review chart and outreach to home care every 4 weeks and as needed.      Melissa Behl BSN, RN, PHN  [facility] Care Coordinator  [phone]

## 2019-01-29 ENCOUNTER — INFUSION THERAPY VISIT (OUTPATIENT)
Dept: INFUSION THERAPY | Facility: CLINIC | Age: 84
End: 2019-01-29
Payer: MEDICARE

## 2019-01-29 VITALS
TEMPERATURE: 97.9 F | BODY MASS INDEX: 27.96 KG/M2 | HEART RATE: 70 BPM | OXYGEN SATURATION: 99 % | RESPIRATION RATE: 16 BRPM | SYSTOLIC BLOOD PRESSURE: 126 MMHG | WEIGHT: 162.9 LBS | DIASTOLIC BLOOD PRESSURE: 74 MMHG

## 2019-01-29 DIAGNOSIS — M05.79 RHEUMATOID ARTHRITIS INVOLVING MULTIPLE SITES WITH POSITIVE RHEUMATOID FACTOR (H): Primary | ICD-10-CM

## 2019-01-29 PROCEDURE — 96365 THER/PROPH/DIAG IV INF INIT: CPT | Performed by: INTERNAL MEDICINE

## 2019-01-29 PROCEDURE — 99207 ZZC NO CHARGE LOS: CPT

## 2019-01-29 RX ORDER — DIPHENHYDRAMINE HCL 25 MG
25 TABLET ORAL PRN
COMMUNITY
End: 2019-09-09 | Stop reason: DRUGHIGH

## 2019-01-29 RX ADMIN — Medication 250 ML: at 10:28

## 2019-01-29 ASSESSMENT — PAIN SCALES - GENERAL: PAINLEVEL: NO PAIN (0)

## 2019-01-29 NOTE — PROGRESS NOTES
Infusion Nursing Note:  Linda Spicer presents today for Orencia.    Patient seen by provider today: No   present during visit today: Not Applicable.    Note: Dr. Davenport notified of patient having angiogram on 1/31/19 and ok'd her getting Orencia today.     Intravenous Access:  Peripheral IV placed.    Treatment Conditions:  Biological Infusion Checklist:    ~~~ NOTE: If the patient answers yes to any of the questions below, hold the infusion and contact ordering provider or on-call provider.    1. Have you recently had an elevated temperature, fever, chills, productive cough, coughing for 3 weeks or longer or hemoptysis,  abnormal vital signs, night sweats,  chest pain or have you noticed a decrease in your appetite, unexplained weight loss or fatigue? No  2. Do you have any open wounds or new incisions? No  3. Do you have any recent or upcoming hospitalizations, surgeries or dental procedures? Yes, angiocardiogram on 1/31/19  4. Do you currently have or recently have had any signs of illness or infection or are you on any antibiotics? No  5. Have you had any new, sudden or worsening abdominal pain? No  6. Have you or anyone in your household received a live vaccination in the past 4 weeks? Please note:  No live vaccines while on biologic/chemotherapy until 6 months after the last treatment.  Patient can receive the flu vaccine (shot only) and the pneumovax.  It is optimal for the patient to get these vaccines mid cycle, but they can be given at any time as long as it is not on the day of the infusion. No  7. Have you recently been diagnosed with any new nervous system diseases (ie. Multiple sclerosis, Guillain Magnet, seizures, neurological changes) or cancer diagnosis? Are you on any form of radiation or chemotherapy? No  8. Are you pregnant or breast feeding or do you have plans of pregnancy in the future? No  9. Have you been having any signs of worsening depression or suicidal ideations?   (benlysta only) No  10. Have there been any other new onset medical symptoms? No    Post Infusion Assessment:  Patient tolerated infusion without incident.  Site patent and intact, free from redness, edema or discomfort.  No evidence of extravasations.  Access discontinued per protocol.    Discharge Plan:   Patient and/or family verbalized understanding of discharge instructions and all questions answered.  Patient discharged in stable condition accompanied by: self and daughter.  Departure Mode: Ambulatory.    Trinity Varela RN

## 2019-01-29 NOTE — PROGRESS NOTES
SPIRITUAL HEALTH SERVICES Progress Note  [facility]    Visited Linda Spicer for ongoing emotional/spiritual support.  Her daughter Xi accompanies her today.       Illness Narrative - Patient has been in the hospital recently and she states that if all goes well she will be able to have mitral valve surgery and will feel better. She is back in her apartment at [facility] after a stay in transition care.      Distress - Patient shared that it has been difficult waiting to find out what was medically wrong and if anything more could be done.  She is feeling hopeful now. Her daughter who she worries about  is doing better after surgery. Patient enjoys being with her children but doesn't like to ask for help.      Coping - Patient was happy to be anointed while in the hospital.  She is a friend of Father Tho at the .   I offered Jewish communion and prayer.  They both shared with me conversation about their debbie and how they derive support from it.       Meaning-Making - Patient is a giver and enjoys reaching out to others. She tries hard to remember names and what is going on in people's lives. She wants to stay connected.      Plan - I gave them my contact information and they know they can request a  visit as needed.    Adrianne Gomez  Associate   Pager

## 2019-01-29 NOTE — TELEPHONE ENCOUNTER
Verbal ok for home health orders given to Abraham from [facility].     Patient takes Benadry 25-50mg 1-2 times a day PRN.  Abraham also stated that Linda's allery list includes Nitroglycerin which she is also prescribed.    KAI Cook MA

## 2019-01-30 ENCOUNTER — TELEPHONE (OUTPATIENT)
Dept: INTERNAL MEDICINE | Facility: CLINIC | Age: 84
End: 2019-01-30

## 2019-01-30 DIAGNOSIS — R93.1 ABNORMAL FINDINGS DIAGNOSTIC IMAGING OF HEART AND CORONARY CIRCULATION: ICD-10-CM

## 2019-01-30 DIAGNOSIS — I50.30 CONGESTIVE HEART FAILURE WITH PRESERVED LV FUNCTION, NYHA CLASS 3 (H): Primary | ICD-10-CM

## 2019-01-30 DIAGNOSIS — I34.0 MITRAL VALVE INSUFFICIENCY: Primary | ICD-10-CM

## 2019-01-30 RX ORDER — LIDOCAINE 40 MG/G
CREAM TOPICAL
Status: CANCELLED | OUTPATIENT
Start: 2019-01-30

## 2019-01-30 RX ORDER — SODIUM CHLORIDE 9 MG/ML
INJECTION, SOLUTION INTRAVENOUS CONTINUOUS
Status: CANCELLED | OUTPATIENT
Start: 2019-01-30

## 2019-01-30 NOTE — TELEPHONE ENCOUNTER
Reason for Call:  Other Delay in PHYSICAL THERAPY orders    Detailed comments: PHYSICAL THERAPY was suppose to start 1/28/19 but patient has doctor appointments this week and wants to start on the week  2/4/2019    Phone Number Patient can be reached at: Other phone number:    [facility] ([facility]) [phone]         Best Time: any    Can we leave a detailed message on this number? YES    Call taken on 1/30/2019 at 10:18 AM by Marilia Wright

## 2019-01-30 NOTE — PROGRESS NOTES
Date: 1/30/2019    Time of Call: 2:54 PM     Diagnosis:  Mitral valve insufficiancy     [ TORB ] Ordering provider: Avila Watson MD  Order: Pre procedure order set     Order received by: Lucia Quigley RN     Follow-up/additional notes: Patient will hold apixiban

## 2019-01-31 ENCOUNTER — HOSPITAL ENCOUNTER (OUTPATIENT)
Dept: CARDIOLOGY | Facility: CLINIC | Age: 84
End: 2019-01-31
Attending: INTERNAL MEDICINE
Payer: MEDICARE

## 2019-01-31 ENCOUNTER — TELEPHONE (OUTPATIENT)
Dept: INTERNAL MEDICINE | Facility: CLINIC | Age: 84
End: 2019-01-31

## 2019-01-31 ENCOUNTER — HOSPITAL ENCOUNTER (OUTPATIENT)
Facility: CLINIC | Age: 84
Discharge: HOME | End: 2019-01-31
Attending: INTERNAL MEDICINE | Admitting: INTERNAL MEDICINE
Payer: MEDICARE

## 2019-01-31 ENCOUNTER — APPOINTMENT (OUTPATIENT)
Dept: MEDSURG UNIT | Facility: CLINIC | Age: 84
End: 2019-01-31
Attending: INTERNAL MEDICINE
Payer: MEDICARE

## 2019-01-31 VITALS
TEMPERATURE: 97.9 F | RESPIRATION RATE: 14 BRPM | SYSTOLIC BLOOD PRESSURE: 132 MMHG | DIASTOLIC BLOOD PRESSURE: 62 MMHG | HEART RATE: 69 BPM | OXYGEN SATURATION: 96 %

## 2019-01-31 VITALS
OXYGEN SATURATION: 99 % | HEART RATE: 70 BPM | DIASTOLIC BLOOD PRESSURE: 76 MMHG | RESPIRATION RATE: 16 BRPM | SYSTOLIC BLOOD PRESSURE: 149 MMHG

## 2019-01-31 DIAGNOSIS — Z98.890 S/P CORONARY ANGIOGRAM: Primary | ICD-10-CM

## 2019-01-31 DIAGNOSIS — I34.0 MITRAL VALVE INSUFFICIENCY, UNSPECIFIED ETIOLOGY: ICD-10-CM

## 2019-01-31 DIAGNOSIS — R93.89 ABNORMAL FINDINGS ON DIAGNOSTIC IMAGING OF CARDIOVASCULAR SYSTEM: ICD-10-CM

## 2019-01-31 DIAGNOSIS — R93.1 ABNORMAL FINDINGS DIAGNOSTIC IMAGING OF HEART AND CORONARY CIRCULATION: ICD-10-CM

## 2019-01-31 DIAGNOSIS — I50.30 CONGESTIVE HEART FAILURE WITH PRESERVED LV FUNCTION, NYHA CLASS 3 (H): ICD-10-CM

## 2019-01-31 DIAGNOSIS — I34.0 MITRAL VALVE INSUFFICIENCY: ICD-10-CM

## 2019-01-31 LAB
ANION GAP SERPL CALCULATED.3IONS-SCNC: 9 MMOL/L (ref 3–14)
BUN SERPL-MCNC: 45 MG/DL (ref 7–30)
CALCIUM SERPL-MCNC: 8.5 MG/DL (ref 8.5–10.1)
CHLORIDE SERPL-SCNC: 106 MMOL/L (ref 94–109)
CO2 SERPL-SCNC: 26 MMOL/L (ref 20–32)
CREAT SERPL-MCNC: 1.86 MG/DL (ref 0.52–1.04)
ERYTHROCYTE [DISTWIDTH] IN BLOOD BY AUTOMATED COUNT: 13.2 % (ref 10–15)
GFR SERPL CREATININE-BSD FRML MDRD: 24 ML/MIN/{1.73_M2}
GLUCOSE SERPL-MCNC: 92 MG/DL (ref 70–99)
HCT VFR BLD AUTO: 39 % (ref 35–47)
HGB BLD-MCNC: 12.8 G/DL (ref 11.7–15.7)
INR PPP: 1.15 (ref 0.86–1.14)
MCH RBC QN AUTO: 33 PG (ref 26.5–33)
MCHC RBC AUTO-ENTMCNC: 32.8 G/DL (ref 31.5–36.5)
MCV RBC AUTO: 101 FL (ref 78–100)
PLATELET # BLD AUTO: 259 10E9/L (ref 150–450)
POTASSIUM SERPL-SCNC: 3.9 MMOL/L (ref 3.4–5.3)
RBC # BLD AUTO: 3.88 10E12/L (ref 3.8–5.2)
SODIUM SERPL-SCNC: 141 MMOL/L (ref 133–144)
WBC # BLD AUTO: 6.2 10E9/L (ref 4–11)

## 2019-01-31 PROCEDURE — 93460 R&L HRT ART/VENTRICLE ANGIO: CPT | Mod: 26 | Performed by: INTERNAL MEDICINE

## 2019-01-31 PROCEDURE — 93320 DOPPLER ECHO COMPLETE: CPT | Mod: 26 | Performed by: INTERNAL MEDICINE

## 2019-01-31 PROCEDURE — 99152 MOD SED SAME PHYS/QHP 5/>YRS: CPT | Performed by: INTERNAL MEDICINE

## 2019-01-31 PROCEDURE — 85610 PROTHROMBIN TIME: CPT | Performed by: PHYSICIAN ASSISTANT

## 2019-01-31 PROCEDURE — 40000065 ZZH STATISTIC EKG NON-CHARGEABLE

## 2019-01-31 PROCEDURE — 93312 ECHO TRANSESOPHAGEAL: CPT | Mod: 26 | Performed by: INTERNAL MEDICINE

## 2019-01-31 PROCEDURE — 99207 ZZC NO BILLABLE SERVICE THIS VISIT: CPT | Performed by: PHYSICIAN ASSISTANT

## 2019-01-31 PROCEDURE — 93325 DOPPLER ECHO COLOR FLOW MAPG: CPT | Mod: 26 | Performed by: INTERNAL MEDICINE

## 2019-01-31 PROCEDURE — 85027 COMPLETE CBC AUTOMATED: CPT | Performed by: PHYSICIAN ASSISTANT

## 2019-01-31 PROCEDURE — 25000125 ZZHC RX 250: Performed by: INTERNAL MEDICINE

## 2019-01-31 PROCEDURE — 80048 BASIC METABOLIC PNL TOTAL CA: CPT | Performed by: PHYSICIAN ASSISTANT

## 2019-01-31 PROCEDURE — 25000128 H RX IP 250 OP 636: Performed by: INTERNAL MEDICINE

## 2019-01-31 PROCEDURE — 27210794 ZZH OR GENERAL SUPPLY STERILE: Performed by: INTERNAL MEDICINE

## 2019-01-31 PROCEDURE — 25000128 H RX IP 250 OP 636: Performed by: PHYSICIAN ASSISTANT

## 2019-01-31 PROCEDURE — 36415 COLL VENOUS BLD VENIPUNCTURE: CPT | Performed by: PHYSICIAN ASSISTANT

## 2019-01-31 PROCEDURE — 99152 MOD SED SAME PHYS/QHP 5/>YRS: CPT

## 2019-01-31 PROCEDURE — 99153 MOD SED SAME PHYS/QHP EA: CPT

## 2019-01-31 PROCEDURE — C1894 INTRO/SHEATH, NON-LASER: HCPCS | Performed by: INTERNAL MEDICINE

## 2019-01-31 PROCEDURE — 40000172 ZZH STATISTIC PROCEDURE PREP ONLY

## 2019-01-31 PROCEDURE — 99153 MOD SED SAME PHYS/QHP EA: CPT | Performed by: INTERNAL MEDICINE

## 2019-01-31 PROCEDURE — 93460 R&L HRT ART/VENTRICLE ANGIO: CPT | Performed by: INTERNAL MEDICINE

## 2019-01-31 PROCEDURE — 93320 DOPPLER ECHO COMPLETE: CPT

## 2019-01-31 RX ORDER — SODIUM CHLORIDE 9 MG/ML
INJECTION, SOLUTION INTRAVENOUS CONTINUOUS
Status: DISCONTINUED | OUTPATIENT
Start: 2019-01-31 | End: 2019-01-31 | Stop reason: HOSPADM

## 2019-01-31 RX ORDER — ADENOSINE 3 MG/ML
12-12000 INJECTION, SOLUTION INTRAVENOUS
Status: DISCONTINUED | OUTPATIENT
Start: 2019-01-31 | End: 2019-01-31 | Stop reason: HOSPADM

## 2019-01-31 RX ORDER — FENTANYL CITRATE 50 UG/ML
25-50 INJECTION, SOLUTION INTRAMUSCULAR; INTRAVENOUS
Status: DISCONTINUED | OUTPATIENT
Start: 2019-01-31 | End: 2019-01-31 | Stop reason: HOSPADM

## 2019-01-31 RX ORDER — CLOPIDOGREL BISULFATE 75 MG/1
75 TABLET ORAL
Status: DISCONTINUED | OUTPATIENT
Start: 2019-01-31 | End: 2019-01-31 | Stop reason: HOSPADM

## 2019-01-31 RX ORDER — ENALAPRILAT 1.25 MG/ML
1.25-2.5 INJECTION INTRAVENOUS
Status: DISCONTINUED | OUTPATIENT
Start: 2019-01-31 | End: 2019-01-31 | Stop reason: HOSPADM

## 2019-01-31 RX ORDER — DOPAMINE HYDROCHLORIDE 160 MG/100ML
2-20 INJECTION, SOLUTION INTRAVENOUS CONTINUOUS PRN
Status: DISCONTINUED | OUTPATIENT
Start: 2019-01-31 | End: 2019-01-31 | Stop reason: HOSPADM

## 2019-01-31 RX ORDER — ATROPINE SULFATE 0.1 MG/ML
.5-1 INJECTION INTRAVENOUS
Status: DISCONTINUED | OUTPATIENT
Start: 2019-01-31 | End: 2019-01-31 | Stop reason: HOSPADM

## 2019-01-31 RX ORDER — NICARDIPINE HYDROCHLORIDE 2.5 MG/ML
100-300 INJECTION INTRAVENOUS
Status: DISCONTINUED | OUTPATIENT
Start: 2019-01-31 | End: 2019-01-31 | Stop reason: HOSPADM

## 2019-01-31 RX ORDER — POTASSIUM CHLORIDE 7.45 MG/ML
10 INJECTION INTRAVENOUS
Status: DISCONTINUED | OUTPATIENT
Start: 2019-01-31 | End: 2019-01-31 | Stop reason: HOSPADM

## 2019-01-31 RX ORDER — METHYLPREDNISOLONE SODIUM SUCCINATE 125 MG/2ML
125 INJECTION, POWDER, LYOPHILIZED, FOR SOLUTION INTRAMUSCULAR; INTRAVENOUS
Status: DISCONTINUED | OUTPATIENT
Start: 2019-01-31 | End: 2019-01-31 | Stop reason: HOSPADM

## 2019-01-31 RX ORDER — PROTAMINE SULFATE 10 MG/ML
25-100 INJECTION, SOLUTION INTRAVENOUS EVERY 5 MIN PRN
Status: DISCONTINUED | OUTPATIENT
Start: 2019-01-31 | End: 2019-01-31 | Stop reason: HOSPADM

## 2019-01-31 RX ORDER — NITROGLYCERIN 5 MG/ML
100-200 VIAL (ML) INTRAVENOUS
Status: DISCONTINUED | OUTPATIENT
Start: 2019-01-31 | End: 2019-01-31 | Stop reason: HOSPADM

## 2019-01-31 RX ORDER — FLUMAZENIL 0.1 MG/ML
0.2 INJECTION, SOLUTION INTRAVENOUS
Status: DISCONTINUED | OUTPATIENT
Start: 2019-01-31 | End: 2019-02-01 | Stop reason: HOSPADM

## 2019-01-31 RX ORDER — LIDOCAINE 40 MG/G
CREAM TOPICAL
Status: DISCONTINUED | OUTPATIENT
Start: 2019-01-31 | End: 2019-02-01 | Stop reason: HOSPADM

## 2019-01-31 RX ORDER — FUROSEMIDE 10 MG/ML
20-100 INJECTION INTRAMUSCULAR; INTRAVENOUS
Status: DISCONTINUED | OUTPATIENT
Start: 2019-01-31 | End: 2019-01-31 | Stop reason: HOSPADM

## 2019-01-31 RX ORDER — CLOPIDOGREL BISULFATE 75 MG/1
300-600 TABLET ORAL
Status: DISCONTINUED | OUTPATIENT
Start: 2019-01-31 | End: 2019-01-31 | Stop reason: HOSPADM

## 2019-01-31 RX ORDER — PROTAMINE SULFATE 10 MG/ML
1-5 INJECTION, SOLUTION INTRAVENOUS
Status: DISCONTINUED | OUTPATIENT
Start: 2019-01-31 | End: 2019-01-31 | Stop reason: HOSPADM

## 2019-01-31 RX ORDER — HEPARIN SODIUM 1000 [USP'U]/ML
1000-10000 INJECTION, SOLUTION INTRAVENOUS; SUBCUTANEOUS EVERY 5 MIN PRN
Status: DISCONTINUED | OUTPATIENT
Start: 2019-01-31 | End: 2019-01-31 | Stop reason: HOSPADM

## 2019-01-31 RX ORDER — PRASUGREL 10 MG/1
10-60 TABLET, FILM COATED ORAL
Status: DISCONTINUED | OUTPATIENT
Start: 2019-01-31 | End: 2019-01-31 | Stop reason: HOSPADM

## 2019-01-31 RX ORDER — NALOXONE HYDROCHLORIDE 0.4 MG/ML
0.4 INJECTION, SOLUTION INTRAMUSCULAR; INTRAVENOUS; SUBCUTANEOUS EVERY 5 MIN PRN
Status: DISCONTINUED | OUTPATIENT
Start: 2019-01-31 | End: 2019-01-31 | Stop reason: HOSPADM

## 2019-01-31 RX ORDER — DEXTROSE MONOHYDRATE 25 G/50ML
12.5-5 INJECTION, SOLUTION INTRAVENOUS EVERY 30 MIN PRN
Status: DISCONTINUED | OUTPATIENT
Start: 2019-01-31 | End: 2019-01-31 | Stop reason: HOSPADM

## 2019-01-31 RX ORDER — FLUMAZENIL 0.1 MG/ML
0.2 INJECTION, SOLUTION INTRAVENOUS
Status: DISCONTINUED | OUTPATIENT
Start: 2019-01-31 | End: 2019-01-31 | Stop reason: HOSPADM

## 2019-01-31 RX ORDER — TIROFIBAN HYDROCHLORIDE 50 UG/ML
0.07 INJECTION INTRAVENOUS CONTINUOUS PRN
Status: DISCONTINUED | OUTPATIENT
Start: 2019-01-31 | End: 2019-01-31 | Stop reason: HOSPADM

## 2019-01-31 RX ORDER — FENTANYL CITRATE 50 UG/ML
25-50 INJECTION, SOLUTION INTRAMUSCULAR; INTRAVENOUS
Status: DISCONTINUED | OUTPATIENT
Start: 2019-01-31 | End: 2019-02-01 | Stop reason: HOSPADM

## 2019-01-31 RX ORDER — VERAPAMIL HYDROCHLORIDE 2.5 MG/ML
1-5 INJECTION, SOLUTION INTRAVENOUS
Status: DISCONTINUED | OUTPATIENT
Start: 2019-01-31 | End: 2019-01-31 | Stop reason: HOSPADM

## 2019-01-31 RX ORDER — SODIUM NITROPRUSSIDE 25 MG/ML
100-200 INJECTION INTRAVENOUS
Status: DISCONTINUED | OUTPATIENT
Start: 2019-01-31 | End: 2019-01-31 | Stop reason: HOSPADM

## 2019-01-31 RX ORDER — NALOXONE HYDROCHLORIDE 0.4 MG/ML
.1-.4 INJECTION, SOLUTION INTRAMUSCULAR; INTRAVENOUS; SUBCUTANEOUS
Status: DISCONTINUED | OUTPATIENT
Start: 2019-01-31 | End: 2019-01-31 | Stop reason: HOSPADM

## 2019-01-31 RX ORDER — ARGATROBAN 1 MG/ML
150 INJECTION, SOLUTION INTRAVENOUS
Status: DISCONTINUED | OUTPATIENT
Start: 2019-01-31 | End: 2019-01-31 | Stop reason: HOSPADM

## 2019-01-31 RX ORDER — METOPROLOL TARTRATE 1 MG/ML
5 INJECTION, SOLUTION INTRAVENOUS EVERY 5 MIN PRN
Status: DISCONTINUED | OUTPATIENT
Start: 2019-01-31 | End: 2019-01-31 | Stop reason: HOSPADM

## 2019-01-31 RX ORDER — NIFEDIPINE 10 MG/1
10 CAPSULE ORAL
Status: DISCONTINUED | OUTPATIENT
Start: 2019-01-31 | End: 2019-01-31 | Stop reason: HOSPADM

## 2019-01-31 RX ORDER — LIDOCAINE HYDROCHLORIDE 10 MG/ML
30 INJECTION, SOLUTION EPIDURAL; INFILTRATION; INTRACAUDAL; PERINEURAL
Status: DISCONTINUED | OUTPATIENT
Start: 2019-01-31 | End: 2019-01-31 | Stop reason: HOSPADM

## 2019-01-31 RX ORDER — SODIUM CHLORIDE 9 MG/ML
INJECTION, SOLUTION INTRAVENOUS CONTINUOUS PRN
Status: DISCONTINUED | OUTPATIENT
Start: 2019-01-31 | End: 2019-02-01 | Stop reason: HOSPADM

## 2019-01-31 RX ORDER — EPTIFIBATIDE 2 MG/ML
1 INJECTION, SOLUTION INTRAVENOUS CONTINUOUS PRN
Status: DISCONTINUED | OUTPATIENT
Start: 2019-01-31 | End: 2019-01-31 | Stop reason: HOSPADM

## 2019-01-31 RX ORDER — HYDRALAZINE HYDROCHLORIDE 20 MG/ML
10-20 INJECTION INTRAMUSCULAR; INTRAVENOUS
Status: DISCONTINUED | OUTPATIENT
Start: 2019-01-31 | End: 2019-01-31 | Stop reason: HOSPADM

## 2019-01-31 RX ORDER — EPINEPHRINE 1 MG/ML
0.3 INJECTION, SOLUTION, CONCENTRATE INTRAVENOUS
Status: DISCONTINUED | OUTPATIENT
Start: 2019-01-31 | End: 2019-01-31 | Stop reason: HOSPADM

## 2019-01-31 RX ORDER — LIDOCAINE 40 MG/G
CREAM TOPICAL
Status: DISCONTINUED | OUTPATIENT
Start: 2019-01-31 | End: 2019-01-31 | Stop reason: HOSPADM

## 2019-01-31 RX ORDER — ATROPINE SULFATE 0.1 MG/ML
0.5 INJECTION INTRAVENOUS EVERY 5 MIN PRN
Status: DISCONTINUED | OUTPATIENT
Start: 2019-01-31 | End: 2019-01-31 | Stop reason: HOSPADM

## 2019-01-31 RX ORDER — POTASSIUM CHLORIDE 29.8 MG/ML
20 INJECTION INTRAVENOUS
Status: DISCONTINUED | OUTPATIENT
Start: 2019-01-31 | End: 2019-01-31 | Stop reason: HOSPADM

## 2019-01-31 RX ORDER — EPTIFIBATIDE 2 MG/ML
180 INJECTION, SOLUTION INTRAVENOUS EVERY 10 MIN PRN
Status: DISCONTINUED | OUTPATIENT
Start: 2019-01-31 | End: 2019-01-31 | Stop reason: HOSPADM

## 2019-01-31 RX ORDER — ASPIRIN 325 MG
325 TABLET ORAL
Status: DISCONTINUED | OUTPATIENT
Start: 2019-01-31 | End: 2019-01-31 | Stop reason: HOSPADM

## 2019-01-31 RX ORDER — NITROGLYCERIN 20 MG/100ML
.07-2 INJECTION INTRAVENOUS CONTINUOUS PRN
Status: DISCONTINUED | OUTPATIENT
Start: 2019-01-31 | End: 2019-01-31 | Stop reason: HOSPADM

## 2019-01-31 RX ORDER — DOBUTAMINE HYDROCHLORIDE 200 MG/100ML
2-20 INJECTION INTRAVENOUS CONTINUOUS PRN
Status: DISCONTINUED | OUTPATIENT
Start: 2019-01-31 | End: 2019-01-31 | Stop reason: HOSPADM

## 2019-01-31 RX ORDER — DIPHENHYDRAMINE HYDROCHLORIDE 50 MG/ML
25-50 INJECTION INTRAMUSCULAR; INTRAVENOUS
Status: DISCONTINUED | OUTPATIENT
Start: 2019-01-31 | End: 2019-01-31 | Stop reason: HOSPADM

## 2019-01-31 RX ORDER — NITROGLYCERIN 5 MG/ML
100-500 VIAL (ML) INTRAVENOUS
Status: DISCONTINUED | OUTPATIENT
Start: 2019-01-31 | End: 2019-01-31 | Stop reason: HOSPADM

## 2019-01-31 RX ORDER — NALOXONE HYDROCHLORIDE 0.4 MG/ML
.1-.4 INJECTION, SOLUTION INTRAMUSCULAR; INTRAVENOUS; SUBCUTANEOUS
Status: DISCONTINUED | OUTPATIENT
Start: 2019-01-31 | End: 2019-02-01 | Stop reason: HOSPADM

## 2019-01-31 RX ORDER — ASPIRIN 81 MG/1
81-324 TABLET, CHEWABLE ORAL
Status: DISCONTINUED | OUTPATIENT
Start: 2019-01-31 | End: 2019-01-31 | Stop reason: HOSPADM

## 2019-01-31 RX ORDER — NITROGLYCERIN 0.4 MG/1
0.4 TABLET SUBLINGUAL EVERY 5 MIN PRN
Status: DISCONTINUED | OUTPATIENT
Start: 2019-01-31 | End: 2019-01-31 | Stop reason: HOSPADM

## 2019-01-31 RX ORDER — ARGATROBAN 1 MG/ML
350 INJECTION, SOLUTION INTRAVENOUS
Status: DISCONTINUED | OUTPATIENT
Start: 2019-01-31 | End: 2019-01-31 | Stop reason: HOSPADM

## 2019-01-31 RX ORDER — ONDANSETRON 2 MG/ML
4 INJECTION INTRAMUSCULAR; INTRAVENOUS EVERY 4 HOURS PRN
Status: DISCONTINUED | OUTPATIENT
Start: 2019-01-31 | End: 2019-01-31 | Stop reason: HOSPADM

## 2019-01-31 RX ORDER — PHENYLEPHRINE HCL IN 0.9% NACL 1 MG/10 ML
20-100 SYRINGE (ML) INTRAVENOUS
Status: DISCONTINUED | OUTPATIENT
Start: 2019-01-31 | End: 2019-01-31 | Stop reason: HOSPADM

## 2019-01-31 RX ORDER — FENTANYL CITRATE 50 UG/ML
25 INJECTION, SOLUTION INTRAMUSCULAR; INTRAVENOUS
Status: DISCONTINUED | OUTPATIENT
Start: 2019-01-31 | End: 2019-02-01 | Stop reason: HOSPADM

## 2019-01-31 RX ORDER — NALOXONE HYDROCHLORIDE 0.4 MG/ML
.2-.4 INJECTION, SOLUTION INTRAMUSCULAR; INTRAVENOUS; SUBCUTANEOUS
Status: DISCONTINUED | OUTPATIENT
Start: 2019-01-31 | End: 2019-01-31

## 2019-01-31 RX ADMIN — SODIUM CHLORIDE: 9 INJECTION, SOLUTION INTRAVENOUS at 11:28

## 2019-01-31 RX ADMIN — TOPICAL ANESTHETIC 0.5 ML: 200 SPRAY DENTAL; PERIODONTAL at 09:30

## 2019-01-31 RX ADMIN — LIDOCAINE HYDROCHLORIDE 30 ML: 20 SOLUTION ORAL; TOPICAL at 09:30

## 2019-01-31 RX ADMIN — FENTANYL CITRATE 75 MCG: 50 INJECTION, SOLUTION INTRAMUSCULAR; INTRAVENOUS at 09:58

## 2019-01-31 RX ADMIN — MIDAZOLAM HYDROCHLORIDE 2 MG: 2 INJECTION, SOLUTION INTRAMUSCULAR; INTRAVENOUS at 09:58

## 2019-01-31 RX ADMIN — FENTANYL CITRATE 25 MCG: 50 INJECTION, SOLUTION INTRAMUSCULAR; INTRAVENOUS at 12:40

## 2019-01-31 RX ADMIN — FENTANYL CITRATE 25 MCG: 50 INJECTION, SOLUTION INTRAMUSCULAR; INTRAVENOUS at 12:30

## 2019-01-31 NOTE — DISCHARGE INSTRUCTIONS
Going Home after an Angiogram  Patient Name: Linda Spicer  Date of Procedure: January 31, 2019        After you go home:    Have an adult stay with you for 24 hours.    Drink plenty of fluids.    You may eat your normal diet, unless your doctor tells you otherwise.    For 24 hours:    Relax and take it easy.    Do NOT smoke.    Do NOT make any important or legal decisions.    Do NOT drive or operate machines at home or at work.    Do NOT drink alcohol.    Remove the Band-Aid after 24 hours. If there is minor oozing, apply another Band-aid and remove it after 12 hours.    For 2 days, do NOT have sex or do any heavy exercise.    Do NOT take a bath, or use a hot tub or pool for at least 3 days. You may shower.    Care of groin site  It is normal to have a small bruise or lump at the site.    Do not scrub the site.    For the first 2 days: Do not stoop or squat. When you cough, sneeze or move your bowels, hold your hand over the puncture site and press gently.    Do not lift more than 10 pounds for at least 3 to 5 days.    Do not use lotion or powder near the puncture site for 3 days.    If you start bleeding from the site in your groin, lie down flat and press firmly  on the site. Call your doctor as soon as you can.    Medicines    If you have started taking Plavix or Effient, do not stop taking it until you talk to your heart doctor (cardiologist).    If you are on metformin (Glucophage), do not restart it until you have blood tests (within 2 to 3 days after discharge). When your doctor tells you it is safe, you may restart the metformin.    If you have stopped any other medicines, check with your nurse or provider about when to restart them.    Call 911 right away if you have bleeding that is heavy or does not stop.    Call your doctor if:    You have a large or growing hard lump around the site.    The site is red, swollen, hot or tender.    Blood or fluid is draining from the site.    You have chills or a  fever greater than 101 F (38 C).    Your leg feels numb or cool.    You have hives, a rash or unusual itching.      [facility] [place]:  [phone] (7 days a week)      We will contact you tomorrow for follow-up. Number where we can reach you: ***

## 2019-01-31 NOTE — TELEPHONE ENCOUNTER
Notify Filomena PAZ from [facility] that I agree to reducing home health aide visit to once a week starting on 2/4/19.

## 2019-01-31 NOTE — TELEPHONE ENCOUNTER
Reason for Call:  Home Health Care    Home Health aide reduce visit on 2/4 to just one visit on that week    Pt Provider: [facility]    Phone Number Homecare Nurse can be reached at: Filomena PAZ [facility] [phone]    Can we leave a detailed message on this number? YES    Best Time: any    Call taken on 1/31/2019 at 12:15 PM by Maribel Crockett

## 2019-01-31 NOTE — PROGRESS NOTES
4 Fr arterial and 7 Fr venous right groin sheath pulled by BRANDI Greer. Patient tolerated well. Hemostasis Achieved at 1450 Pedal pulses palpable. On bedrest till 1650. Patient verbalized understanding not move right leg. Will continue to monitor.

## 2019-01-31 NOTE — PROGRESS NOTES
Pt here for YOLANDA. Procedure was explained to the pt and the consent was signed.Discharge instructions were reviewed and a copy was given to the pt.  Pt was given Fentanyl 75 mcg IV and Versed 2 mg IV for sedation. Pt tolerated the procedure without any adverse effects. Pt denies any pain or discomfort post procedure.  Probe # 60 was used. Pt to remain NPO until 11:30am. Report called to 2A RN. Pt transported to 2A on a stretcher for upcoming angiogram.

## 2019-01-31 NOTE — PROGRESS NOTES
[facility]   Interventional Cardiology        Consenting/Education for Coronary Angiogram/Right Heart Catheterization/Left heart catheterization    Patient understands during the portion for right heart catheterization a fine tube (catheter) is put into the vein of the groin/neck.  It is carefully passed along until it reaches the heart and then goes up into the blood vessels of the lungs. This is done to measure a variety of pressures in your heart and can tell us how well the heart is filling and emptying, as well as monitor fluid status. During the portion for the coronary angiogram a fine tube (catheter) is put into the artery in the groin/arm. The tube is carefully passed into each coronary artery. A series of pictures are taken using x-rays and a contrast medium (x-ray dye). The contrast medium is injected to look for any blockages or narrowing in the arteries of the heart.  For the left heart catheterization, a catheter is inserted into the left chambers of the heart to measure pressures.      Patient also understands risks and complications of the procedure which include, but are not limited to bruising/swelling around the incision site, infection, bleeding, allergic reaction to local anesthetic, air embolism, arterial puncture, stroke, heart attack.  Patient also understands this procedure requires moderate sedation.     Patient verbalized understanding of risks and benefits of theleft heart catheterization, right heart catheterization, and endomyocardial biopsy and has elected to proceed with the procedure.         Fredrick Will PA-C  [facility] Cardiology Team

## 2019-01-31 NOTE — PROCEDURES
PROCEDURES PERFORMED:   Right Heart Catheterization  Coronary Angiography  Left Heart Catheterization    PHYSICIANS:  Attending Physician: Avila Watson MD  Interventional Cardiology Fellow: Vinny Pascal    INDICATION:  Linda Spicer is a 87 year old woman with a history of heart failure and mitral regurgitation presenting for Right / Left heart catheterization with coronary angiography.    DESCRIPTION:  1. Consent obtained with discussion of risks.  All questions were answered.  2. Sterile prep and procedure.  3. Location with Sheaths:   Rt Femoral Arterial  4 Fr 25 cm [long]   Rt Femoral Venous 7 Fr 25 cm  4. Access: Local anesthetic with lidocaine.  A standard 18 guage needle with ultrasound guidance was used to establish vascular access using a modified Seldinger technique.  5. Diagnostic Catheters:   4 Fr  JL 4, 3DRC, Pigtail  7 Fr  [place]  6. Guiding Catheters:  None  6. Estimated blood loss: < 25 ml    MEDICATIONS:  The procedure was performed under conscious sedation for 60 minutes from 1230 to 1330.  The patient was assessed immediately before the first sedation medication was administered.  Midazolam 0 mg and Fentanyl 50 mcg were administered.  Heart rate, BP, respiration, oxygen saturation and patient responses were monitored throughout the procedure with the assistance of the RN under my supervision.    Procedures:    HEMODYNAMICS:  BSA 1.83  1. HR 67 bpm  2. /83/118 mmHg  3. RA 7/8/6   4. RV 46/5  5. PA 46/18/32   6. PCW 16/16/13   7. PA sat 61%   8. PCW sat --%  9. Hgb 12 g/dL   10. Adrienne CO 2.5   11. Adrienne CI 1.4   12. TD CO 2.7   13. TD CI 1.4   14. PVR 7.8     CORONARY ANGIOGRAM:   1. Both coronary arteries arise from their respective cusps.  2. Dominance: Right  3. The LM is without angiographic evidence of disease.   4. LAD: Type 3 [LAD supplies the entire apex].. The LAD gives rise to septal perforators.  The proximal LAD has an ostial 40-50% lesion. The mid LAD has a 40-50%  lesion. Distal and apical LAD is small and tortuous.  5. LCX gives rise to single large OM, tortuous.  The proximal LCx has minimal luminal irregularities. The OM branch has mild 30% proximal disease. It is otherwise a tortuous vessel.  6. RCA gives rise to PL branches and supplies PDA. The RCA has a mid lesion with 40% stenosis, long segment, and calcified. There is a proximal 30% calcified lesion. The distal RCA has minimal disease including its branches.  7. There is a large Ramus branch with 30-40% ostial disease.    LEFT HEART CATHETERIZATION:  1. LVEDP 15 mmHg.  2. No gradient across the aortic valve on pull back.  3. 1.0 liter of normal saline was given prior to performing simultaneous LV/RV pressures to evaluate for restrictive/constrictive physiology given markedly reduced cardiac index of 1.4 by Adrienne and TD. There was equalization of LV and RV end diastolic pressure at 15 mmHg after the fluid challenge with concordant respiratory variability suggestive of restrictive physiology.    Sheath Removal:  The right femoral artery and vein sheath was manually removed in the cardiac catheterization laboratory.    Contrast: Isovue, 18 ml     Fluoroscopy Time: 6.4 min    COMPLICATIONS:  1. None    SUMMARY:   Normal left and right sided filling pressures.  Mild pulmonary hypertension, primary  Reduced cardiac output.  Restrictive physiology / cardiomyopathy.  Mild non obstructive coronary artery disease.    PLAN:   >> Follow up results of YOLANDA to evaluate mitral regurgitation, if severe will proceed with MitraClip  >> Bedrest per protocol.  >> Continued medical management and lifestyle modification for cardiovascular risk factor optimization.   >>. Discharge today per protocol    The attending interventional cardiologist was present and supervised all critical aspects the procedure.    Findings discussed with primary team.    See CVIS report for final draft.    Vinny Pascal   Cardiology Fellow    Avila Watson    Cardiology Staff    I was present for the entire procedure.     Avila Watson M.D.  Interventional Cardiology  [facility]  Pager: [phone]

## 2019-01-31 NOTE — TELEPHONE ENCOUNTER
Request for a one time change to Home Health Aide services next week - provider please advise.    Ana Arzate RN

## 2019-01-31 NOTE — TELEPHONE ENCOUNTER
This writer attempted to contact Linda's nurse Filomena from [facility] on 01/31/19    Reason for call homecare orders and left detailed message.    When patient calls back, please contact 1st floor Aura Bran. routine priority.        Usama Ching

## 2019-02-01 ENCOUNTER — CARE COORDINATION (OUTPATIENT)
Dept: CARDIOLOGY | Facility: CLINIC | Age: 84
End: 2019-02-01

## 2019-02-01 ENCOUNTER — HOSPITAL ENCOUNTER (INPATIENT)
Facility: CLINIC | Age: 84
Setting detail: SURGERY ADMIT
DRG: 229 | End: 2019-02-01
Attending: INTERNAL MEDICINE | Admitting: INTERNAL MEDICINE
Payer: MEDICARE

## 2019-02-01 DIAGNOSIS — I34.0 MITRAL VALVE INSUFFICIENCY, UNSPECIFIED ETIOLOGY: Primary | ICD-10-CM

## 2019-02-01 LAB — INTERPRETATION ECG - MUSE: NORMAL

## 2019-02-01 NOTE — TELEPHONE ENCOUNTER
Toya vna.  Linda has an appointment on Monday. Can this be done over the phone. Please call and let know.

## 2019-02-01 NOTE — PROGRESS NOTES
1-patient is tolerating liquids and foods-yes  2- ambulating-yes  3- urinating-yes  4- puncture sites are stable ( no bleeding and no hematoma)-yes  5- and patient has a -yes  6-IF Vital Signs are within the patient's normal limits or systolic blood pressure greater than 90 mmHg and less than 160 mmHg-yes  7-Patient is alert and oriented-yes  8-If diabetic, blood glucose is in patient's usual normal range-NA

## 2019-02-01 NOTE — PROGRESS NOTES
Discharge instruction reviewed.  Patient verbalized understanding. PIV removed, patient transported to main lobby via w/c. Family awaiting at main lobby. Patient discharged

## 2019-02-01 NOTE — PROGRESS NOTES
Date: 2/1/2019    Time of Call: 9:40 AM     Diagnosis:  Severe mitral regurgitation     [ TORB ] Ordering provider: Avila Watson MD  Order:   CASE REQUEST for MitraClip  PAC referral     Order received by: Gladys Butler     Follow-up/additional notes:   Orders entered for MitraClip procedure, scheduled on Feb. 11, 2019.

## 2019-02-04 NOTE — TELEPHONE ENCOUNTER
This writer attempted to contact Elvia/Candy Rivas on 02/04/19      Reason for call Physical Therapy order and left detailed message.      If patient calls back:   Relay message below, (read verbatim), document that pt called and close encounter        Alisha Toure MA

## 2019-02-05 ENCOUNTER — MEDICAL CORRESPONDENCE (OUTPATIENT)
Dept: HEALTH INFORMATION MANAGEMENT | Facility: CLINIC | Age: 84
End: 2019-02-05

## 2019-02-05 ENCOUNTER — ANCILLARY PROCEDURE (OUTPATIENT)
Dept: CARDIOLOGY | Facility: CLINIC | Age: 84
End: 2019-02-05
Attending: INTERNAL MEDICINE
Payer: MEDICARE

## 2019-02-05 ENCOUNTER — AMBULATORY - HEALTHEAST (OUTPATIENT)
Dept: GERIATRICS | Facility: CLINIC | Age: 84
End: 2019-02-05

## 2019-02-05 ENCOUNTER — TELEPHONE (OUTPATIENT)
Dept: FAMILY MEDICINE | Facility: CLINIC | Age: 84
End: 2019-02-05

## 2019-02-05 DIAGNOSIS — I49.5 SSS (SICK SINUS SYNDROME) (H): ICD-10-CM

## 2019-02-05 PROCEDURE — 93296 REM INTERROG EVL PM/IDS: CPT | Mod: ZF

## 2019-02-05 PROCEDURE — 93294 REM INTERROG EVL PM/LDLS PM: CPT | Performed by: INTERNAL MEDICINE

## 2019-02-05 NOTE — TELEPHONE ENCOUNTER
Late entry:  2/4/19  11:00 pm    Device nurse aware and appointment canceled. Device nurse will contact patient.    Dina Swan RN

## 2019-02-06 LAB
MDC_IDC_EPISODE_DTM: NORMAL
MDC_IDC_EPISODE_DURATION: NORMAL S
MDC_IDC_EPISODE_ID: 588
MDC_IDC_EPISODE_ID: 589
MDC_IDC_EPISODE_ID: 590
MDC_IDC_EPISODE_ID: 591
MDC_IDC_EPISODE_ID: 592
MDC_IDC_EPISODE_ID: 593
MDC_IDC_EPISODE_TYPE: NORMAL
MDC_IDC_LEAD_IMPLANT_DT: NORMAL
MDC_IDC_LEAD_IMPLANT_DT: NORMAL
MDC_IDC_LEAD_LOCATION: NORMAL
MDC_IDC_LEAD_LOCATION: NORMAL
MDC_IDC_LEAD_LOCATION_DETAIL_1: NORMAL
MDC_IDC_LEAD_LOCATION_DETAIL_1: NORMAL
MDC_IDC_LEAD_MFG: NORMAL
MDC_IDC_LEAD_MFG: NORMAL
MDC_IDC_LEAD_MODEL: NORMAL
MDC_IDC_LEAD_MODEL: NORMAL
MDC_IDC_LEAD_POLARITY_TYPE: NORMAL
MDC_IDC_LEAD_POLARITY_TYPE: NORMAL
MDC_IDC_LEAD_SERIAL: NORMAL
MDC_IDC_LEAD_SERIAL: NORMAL
MDC_IDC_MSMT_BATTERY_DTM: NORMAL
MDC_IDC_MSMT_BATTERY_REMAINING_LONGEVITY: 89 MO
MDC_IDC_MSMT_BATTERY_RRT_TRIGGER: 2.83
MDC_IDC_MSMT_BATTERY_STATUS: NORMAL
MDC_IDC_MSMT_BATTERY_VOLTAGE: 3.01 V
MDC_IDC_MSMT_LEADCHNL_RA_IMPEDANCE_VALUE: 361 OHM
MDC_IDC_MSMT_LEADCHNL_RA_IMPEDANCE_VALUE: 532 OHM
MDC_IDC_MSMT_LEADCHNL_RA_PACING_THRESHOLD_AMPLITUDE: 1 V
MDC_IDC_MSMT_LEADCHNL_RA_PACING_THRESHOLD_PULSEWIDTH: 0.4 MS
MDC_IDC_MSMT_LEADCHNL_RA_SENSING_INTR_AMPL: 1.5 MV
MDC_IDC_MSMT_LEADCHNL_RA_SENSING_INTR_AMPL: 1.5 MV
MDC_IDC_MSMT_LEADCHNL_RV_IMPEDANCE_VALUE: 380 OHM
MDC_IDC_MSMT_LEADCHNL_RV_IMPEDANCE_VALUE: 437 OHM
MDC_IDC_MSMT_LEADCHNL_RV_PACING_THRESHOLD_AMPLITUDE: 0.62 V
MDC_IDC_MSMT_LEADCHNL_RV_PACING_THRESHOLD_PULSEWIDTH: 0.4 MS
MDC_IDC_MSMT_LEADCHNL_RV_SENSING_INTR_AMPL: 17.12 MV
MDC_IDC_MSMT_LEADCHNL_RV_SENSING_INTR_AMPL: 17.12 MV
MDC_IDC_PG_IMPLANT_DTM: NORMAL
MDC_IDC_PG_MFG: NORMAL
MDC_IDC_PG_MODEL: NORMAL
MDC_IDC_PG_SERIAL: NORMAL
MDC_IDC_PG_TYPE: NORMAL
MDC_IDC_SESS_CLINIC_NAME: NORMAL
MDC_IDC_SESS_DTM: NORMAL
MDC_IDC_SESS_TYPE: NORMAL
MDC_IDC_SET_BRADY_AT_MODE_SWITCH_RATE: 171 {BEATS}/MIN
MDC_IDC_SET_BRADY_HYSTRATE: NORMAL
MDC_IDC_SET_BRADY_LOWRATE: 70 {BEATS}/MIN
MDC_IDC_SET_BRADY_MAX_SENSOR_RATE: 130 {BEATS}/MIN
MDC_IDC_SET_BRADY_MAX_TRACKING_RATE: 130 {BEATS}/MIN
MDC_IDC_SET_BRADY_MODE: NORMAL
MDC_IDC_SET_BRADY_PAV_DELAY_LOW: 180 MS
MDC_IDC_SET_BRADY_SAV_DELAY_LOW: 150 MS
MDC_IDC_SET_LEADCHNL_RA_PACING_AMPLITUDE: 2 V
MDC_IDC_SET_LEADCHNL_RA_PACING_ANODE_ELECTRODE_1: NORMAL
MDC_IDC_SET_LEADCHNL_RA_PACING_ANODE_LOCATION_1: NORMAL
MDC_IDC_SET_LEADCHNL_RA_PACING_CAPTURE_MODE: NORMAL
MDC_IDC_SET_LEADCHNL_RA_PACING_CATHODE_ELECTRODE_1: NORMAL
MDC_IDC_SET_LEADCHNL_RA_PACING_CATHODE_LOCATION_1: NORMAL
MDC_IDC_SET_LEADCHNL_RA_PACING_POLARITY: NORMAL
MDC_IDC_SET_LEADCHNL_RA_PACING_PULSEWIDTH: 0.4 MS
MDC_IDC_SET_LEADCHNL_RA_SENSING_ANODE_ELECTRODE_1: NORMAL
MDC_IDC_SET_LEADCHNL_RA_SENSING_ANODE_LOCATION_1: NORMAL
MDC_IDC_SET_LEADCHNL_RA_SENSING_CATHODE_ELECTRODE_1: NORMAL
MDC_IDC_SET_LEADCHNL_RA_SENSING_CATHODE_LOCATION_1: NORMAL
MDC_IDC_SET_LEADCHNL_RA_SENSING_POLARITY: NORMAL
MDC_IDC_SET_LEADCHNL_RA_SENSING_SENSITIVITY: 0.3 MV
MDC_IDC_SET_LEADCHNL_RV_PACING_AMPLITUDE: 1.5 V
MDC_IDC_SET_LEADCHNL_RV_PACING_ANODE_ELECTRODE_1: NORMAL
MDC_IDC_SET_LEADCHNL_RV_PACING_ANODE_LOCATION_1: NORMAL
MDC_IDC_SET_LEADCHNL_RV_PACING_CAPTURE_MODE: NORMAL
MDC_IDC_SET_LEADCHNL_RV_PACING_CATHODE_ELECTRODE_1: NORMAL
MDC_IDC_SET_LEADCHNL_RV_PACING_CATHODE_LOCATION_1: NORMAL
MDC_IDC_SET_LEADCHNL_RV_PACING_POLARITY: NORMAL
MDC_IDC_SET_LEADCHNL_RV_PACING_PULSEWIDTH: 0.4 MS
MDC_IDC_SET_LEADCHNL_RV_SENSING_ANODE_ELECTRODE_1: NORMAL
MDC_IDC_SET_LEADCHNL_RV_SENSING_ANODE_LOCATION_1: NORMAL
MDC_IDC_SET_LEADCHNL_RV_SENSING_CATHODE_ELECTRODE_1: NORMAL
MDC_IDC_SET_LEADCHNL_RV_SENSING_CATHODE_LOCATION_1: NORMAL
MDC_IDC_SET_LEADCHNL_RV_SENSING_POLARITY: NORMAL
MDC_IDC_SET_LEADCHNL_RV_SENSING_SENSITIVITY: 0.9 MV
MDC_IDC_SET_ZONE_DETECTION_INTERVAL: 350 MS
MDC_IDC_SET_ZONE_DETECTION_INTERVAL: 400 MS
MDC_IDC_SET_ZONE_TYPE: NORMAL
MDC_IDC_STAT_AT_BURDEN_PERCENT: 52.4 %
MDC_IDC_STAT_AT_DTM_END: NORMAL
MDC_IDC_STAT_AT_DTM_START: NORMAL
MDC_IDC_STAT_BRADY_AP_VP_PERCENT: 0.63 %
MDC_IDC_STAT_BRADY_AP_VS_PERCENT: 44.78 %
MDC_IDC_STAT_BRADY_AS_VP_PERCENT: 49.24 %
MDC_IDC_STAT_BRADY_AS_VS_PERCENT: 5.35 %
MDC_IDC_STAT_BRADY_DTM_END: NORMAL
MDC_IDC_STAT_BRADY_DTM_START: NORMAL
MDC_IDC_STAT_BRADY_RA_PERCENT_PACED: 38.07 %
MDC_IDC_STAT_BRADY_RV_PERCENT_PACED: 49.14 %
MDC_IDC_STAT_EPISODE_RECENT_COUNT: 0
MDC_IDC_STAT_EPISODE_RECENT_COUNT: 17
MDC_IDC_STAT_EPISODE_RECENT_COUNT_DTM_END: NORMAL
MDC_IDC_STAT_EPISODE_RECENT_COUNT_DTM_START: NORMAL
MDC_IDC_STAT_EPISODE_TOTAL_COUNT: 0
MDC_IDC_STAT_EPISODE_TOTAL_COUNT: 0
MDC_IDC_STAT_EPISODE_TOTAL_COUNT: 3
MDC_IDC_STAT_EPISODE_TOTAL_COUNT: 590
MDC_IDC_STAT_EPISODE_TOTAL_COUNT_DTM_END: NORMAL
MDC_IDC_STAT_EPISODE_TOTAL_COUNT_DTM_START: NORMAL
MDC_IDC_STAT_EPISODE_TYPE: NORMAL

## 2019-02-07 ENCOUNTER — HOSPITAL ENCOUNTER (OUTPATIENT)
Facility: CLINIC | Age: 84
Setting detail: OBSERVATION
Discharge: HOME | End: 2019-02-08
Attending: INTERNAL MEDICINE | Admitting: INTERNAL MEDICINE
Payer: MEDICARE

## 2019-02-07 ENCOUNTER — CARE COORDINATION (OUTPATIENT)
Dept: CARDIOLOGY | Facility: CLINIC | Age: 84
End: 2019-02-07

## 2019-02-07 ENCOUNTER — MEDICAL CORRESPONDENCE (OUTPATIENT)
Dept: HEALTH INFORMATION MANAGEMENT | Facility: CLINIC | Age: 84
End: 2019-02-07

## 2019-02-07 ENCOUNTER — APPOINTMENT (OUTPATIENT)
Dept: GENERAL RADIOLOGY | Facility: CLINIC | Age: 84
End: 2019-02-07
Attending: INTERNAL MEDICINE
Payer: MEDICARE

## 2019-02-07 ENCOUNTER — OFFICE VISIT (OUTPATIENT)
Dept: SURGERY | Facility: CLINIC | Age: 84
End: 2019-02-07
Payer: MEDICARE

## 2019-02-07 ENCOUNTER — ANESTHESIA EVENT (OUTPATIENT)
Dept: SURGERY | Facility: CLINIC | Age: 84
DRG: 229 | End: 2019-02-07
Payer: MEDICARE

## 2019-02-07 VITALS
HEART RATE: 64 BPM | HEIGHT: 64 IN | WEIGHT: 162 LBS | BODY MASS INDEX: 27.66 KG/M2 | SYSTOLIC BLOOD PRESSURE: 130 MMHG | DIASTOLIC BLOOD PRESSURE: 72 MMHG | TEMPERATURE: 98 F | OXYGEN SATURATION: 95 %

## 2019-02-07 DIAGNOSIS — I34.0 MITRAL VALVE INSUFFICIENCY, UNSPECIFIED ETIOLOGY: Primary | ICD-10-CM

## 2019-02-07 DIAGNOSIS — I48.92 ATRIAL FLUTTER, PAROXYSMAL (H): ICD-10-CM

## 2019-02-07 DIAGNOSIS — I34.0 MITRAL VALVE INSUFFICIENCY, UNSPECIFIED ETIOLOGY: ICD-10-CM

## 2019-02-07 DIAGNOSIS — Z01.818 PREOP EXAMINATION: Primary | ICD-10-CM

## 2019-02-07 PROBLEM — R06.00 DYSPNEA: Status: ACTIVE | Noted: 2019-02-07

## 2019-02-07 LAB
ALBUMIN SERPL-MCNC: 3.1 G/DL (ref 3.4–5)
ALP SERPL-CCNC: 51 U/L (ref 40–150)
ALT SERPL W P-5'-P-CCNC: 18 U/L (ref 0–50)
ANION GAP SERPL CALCULATED.3IONS-SCNC: 10 MMOL/L (ref 3–14)
AST SERPL W P-5'-P-CCNC: 27 U/L (ref 0–45)
BILIRUB DIRECT SERPL-MCNC: 0.1 MG/DL (ref 0–0.2)
BILIRUB SERPL-MCNC: 0.4 MG/DL (ref 0.2–1.3)
BUN SERPL-MCNC: 40 MG/DL (ref 7–30)
CALCIUM SERPL-MCNC: 8.6 MG/DL (ref 8.5–10.1)
CHLORIDE SERPL-SCNC: 107 MMOL/L (ref 94–109)
CO2 SERPL-SCNC: 24 MMOL/L (ref 20–32)
CREAT SERPL-MCNC: 1.38 MG/DL (ref 0.52–1.04)
ERYTHROCYTE [DISTWIDTH] IN BLOOD BY AUTOMATED COUNT: 13.3 % (ref 10–15)
GFR SERPL CREATININE-BSD FRML MDRD: 34 ML/MIN/{1.73_M2}
GLUCOSE SERPL-MCNC: 122 MG/DL (ref 70–99)
HCT VFR BLD AUTO: 35.5 % (ref 35–47)
HGB BLD-MCNC: 11.8 G/DL (ref 11.7–15.7)
MCH RBC QN AUTO: 32.9 PG (ref 26.5–33)
MCHC RBC AUTO-ENTMCNC: 33.2 G/DL (ref 31.5–36.5)
MCV RBC AUTO: 99 FL (ref 78–100)
NT-PROBNP SERPL-MCNC: 2955 PG/ML (ref 0–1800)
PLATELET # BLD AUTO: 286 10E9/L (ref 150–450)
POTASSIUM SERPL-SCNC: 5 MMOL/L (ref 3.4–5.3)
PROT SERPL-MCNC: 6.9 G/DL (ref 6.8–8.8)
RBC # BLD AUTO: 3.59 10E12/L (ref 3.8–5.2)
SODIUM SERPL-SCNC: 141 MMOL/L (ref 133–144)
WBC # BLD AUTO: 7.2 10E9/L (ref 4–11)

## 2019-02-07 PROCEDURE — A9270 NON-COVERED ITEM OR SERVICE: HCPCS | Mod: GY | Performed by: INTERNAL MEDICINE

## 2019-02-07 PROCEDURE — 25000132 ZZH RX MED GY IP 250 OP 250 PS 637: Mod: GY | Performed by: INTERNAL MEDICINE

## 2019-02-07 PROCEDURE — 36416 COLLJ CAPILLARY BLOOD SPEC: CPT | Performed by: INTERNAL MEDICINE

## 2019-02-07 PROCEDURE — 83880 ASSAY OF NATRIURETIC PEPTIDE: CPT | Performed by: INTERNAL MEDICINE

## 2019-02-07 PROCEDURE — 21400000 ZZH R&B CCU UMMC

## 2019-02-07 PROCEDURE — 93005 ELECTROCARDIOGRAM TRACING: CPT

## 2019-02-07 PROCEDURE — 71045 X-RAY EXAM CHEST 1 VIEW: CPT

## 2019-02-07 PROCEDURE — 80076 HEPATIC FUNCTION PANEL: CPT | Performed by: INTERNAL MEDICINE

## 2019-02-07 PROCEDURE — 85027 COMPLETE CBC AUTOMATED: CPT | Performed by: INTERNAL MEDICINE

## 2019-02-07 PROCEDURE — 93010 ELECTROCARDIOGRAM REPORT: CPT | Performed by: INTERNAL MEDICINE

## 2019-02-07 PROCEDURE — 25000131 ZZH RX MED GY IP 250 OP 636 PS 637: Mod: GY | Performed by: INTERNAL MEDICINE

## 2019-02-07 PROCEDURE — 80048 BASIC METABOLIC PNL TOTAL CA: CPT | Performed by: INTERNAL MEDICINE

## 2019-02-07 RX ORDER — LIDOCAINE 40 MG/G
CREAM TOPICAL
Status: DISCONTINUED | OUTPATIENT
Start: 2019-02-07 | End: 2019-02-08 | Stop reason: HOSPADM

## 2019-02-07 RX ORDER — CHLORAL HYDRATE 500 MG
1 CAPSULE ORAL DAILY
Status: DISCONTINUED | OUTPATIENT
Start: 2019-02-08 | End: 2019-02-08 | Stop reason: HOSPADM

## 2019-02-07 RX ORDER — AMOXICILLIN 250 MG
2 CAPSULE ORAL 2 TIMES DAILY
Status: DISCONTINUED | OUTPATIENT
Start: 2019-02-07 | End: 2019-02-08 | Stop reason: HOSPADM

## 2019-02-07 RX ORDER — FUROSEMIDE 10 MG/ML
20 INJECTION INTRAMUSCULAR; INTRAVENOUS ONCE
Status: DISCONTINUED | OUTPATIENT
Start: 2019-02-07 | End: 2019-02-08 | Stop reason: HOSPADM

## 2019-02-07 RX ORDER — HYDRALAZINE HYDROCHLORIDE 10 MG/1
10 TABLET, FILM COATED ORAL 3 TIMES DAILY
COMMUNITY
End: 2019-02-20

## 2019-02-07 RX ORDER — NITROGLYCERIN 0.4 MG/1
0.4 TABLET SUBLINGUAL EVERY 5 MIN PRN
Status: DISCONTINUED | OUTPATIENT
Start: 2019-02-07 | End: 2019-02-08 | Stop reason: HOSPADM

## 2019-02-07 RX ORDER — ASCORBIC ACID 500 MG
500 TABLET ORAL DAILY
Status: DISCONTINUED | OUTPATIENT
Start: 2019-02-08 | End: 2019-02-08 | Stop reason: HOSPADM

## 2019-02-07 RX ORDER — MULTIVITAMIN,THERAPEUTIC
1 TABLET ORAL DAILY
Status: DISCONTINUED | OUTPATIENT
Start: 2019-02-08 | End: 2019-02-08 | Stop reason: HOSPADM

## 2019-02-07 RX ORDER — LIDOCAINE 40 MG/G
CREAM TOPICAL
Status: CANCELLED | OUTPATIENT
Start: 2019-02-07

## 2019-02-07 RX ORDER — ISOSORBIDE MONONITRATE 60 MG/1
60 TABLET, EXTENDED RELEASE ORAL DAILY
Status: DISCONTINUED | OUTPATIENT
Start: 2019-02-07 | End: 2019-02-08 | Stop reason: HOSPADM

## 2019-02-07 RX ORDER — FUROSEMIDE 20 MG
20 TABLET ORAL ONCE
Status: COMPLETED | OUTPATIENT
Start: 2019-02-07 | End: 2019-02-07

## 2019-02-07 RX ORDER — CLINDAMYCIN PHOSPHATE 900 MG/50ML
900 INJECTION, SOLUTION INTRAVENOUS
Status: CANCELLED | OUTPATIENT
Start: 2019-02-07

## 2019-02-07 RX ORDER — ONDANSETRON 2 MG/ML
4 INJECTION INTRAMUSCULAR; INTRAVENOUS EVERY 6 HOURS PRN
Status: DISCONTINUED | OUTPATIENT
Start: 2019-02-07 | End: 2019-02-08 | Stop reason: HOSPADM

## 2019-02-07 RX ORDER — FOLIC ACID 1 MG/1
1 TABLET ORAL DAILY
Status: DISCONTINUED | OUTPATIENT
Start: 2019-02-08 | End: 2019-02-08 | Stop reason: HOSPADM

## 2019-02-07 RX ORDER — ASPIRIN 325 MG
325 TABLET ORAL ONCE
Status: CANCELLED | OUTPATIENT
Start: 2019-02-07 | End: 2019-02-07

## 2019-02-07 RX ORDER — LEVOTHYROXINE SODIUM 75 UG/1
75 TABLET ORAL
Status: DISCONTINUED | OUTPATIENT
Start: 2019-02-08 | End: 2019-02-08 | Stop reason: HOSPADM

## 2019-02-07 RX ORDER — ACETAMINOPHEN 650 MG/1
650 SUPPOSITORY RECTAL EVERY 4 HOURS PRN
Status: DISCONTINUED | OUTPATIENT
Start: 2019-02-07 | End: 2019-02-08 | Stop reason: HOSPADM

## 2019-02-07 RX ORDER — FUROSEMIDE 20 MG
20 TABLET ORAL
Status: DISCONTINUED | OUTPATIENT
Start: 2019-02-08 | End: 2019-02-08 | Stop reason: HOSPADM

## 2019-02-07 RX ORDER — CARVEDILOL 3.12 MG/1
3.12 TABLET ORAL 2 TIMES DAILY WITH MEALS
Status: DISCONTINUED | OUTPATIENT
Start: 2019-02-07 | End: 2019-02-08 | Stop reason: HOSPADM

## 2019-02-07 RX ORDER — HYDRALAZINE HYDROCHLORIDE 25 MG/1
25 TABLET, FILM COATED ORAL 4 TIMES DAILY
Status: DISCONTINUED | OUTPATIENT
Start: 2019-02-07 | End: 2019-02-08 | Stop reason: HOSPADM

## 2019-02-07 RX ORDER — ONDANSETRON 4 MG/1
4 TABLET, ORALLY DISINTEGRATING ORAL EVERY 6 HOURS PRN
Status: DISCONTINUED | OUTPATIENT
Start: 2019-02-07 | End: 2019-02-08 | Stop reason: HOSPADM

## 2019-02-07 RX ORDER — ACETAMINOPHEN 325 MG/1
650 TABLET ORAL EVERY 4 HOURS PRN
Status: DISCONTINUED | OUTPATIENT
Start: 2019-02-07 | End: 2019-02-08 | Stop reason: HOSPADM

## 2019-02-07 RX ORDER — ALCOHOL 70.47 ML/100ML
1 GEL TOPICAL DAILY
Status: DISCONTINUED | OUTPATIENT
Start: 2019-02-08 | End: 2019-02-07

## 2019-02-07 RX ORDER — AZATHIOPRINE 50 MG/1
50 TABLET ORAL EVERY EVENING
Status: DISCONTINUED | OUTPATIENT
Start: 2019-02-07 | End: 2019-02-08 | Stop reason: HOSPADM

## 2019-02-07 RX ORDER — AMOXICILLIN 250 MG
1 CAPSULE ORAL 2 TIMES DAILY
Status: DISCONTINUED | OUTPATIENT
Start: 2019-02-07 | End: 2019-02-08 | Stop reason: HOSPADM

## 2019-02-07 RX ORDER — SODIUM CHLORIDE 9 MG/ML
INJECTION, SOLUTION INTRAVENOUS CONTINUOUS
Status: CANCELLED | OUTPATIENT
Start: 2019-02-07

## 2019-02-07 RX ADMIN — CARVEDILOL 3.12 MG: 3.12 TABLET, FILM COATED ORAL at 21:17

## 2019-02-07 RX ADMIN — FUROSEMIDE 20 MG: 20 TABLET ORAL at 21:16

## 2019-02-07 RX ADMIN — HYDRALAZINE HYDROCHLORIDE 25 MG: 25 TABLET ORAL at 21:16

## 2019-02-07 RX ADMIN — ISOSORBIDE MONONITRATE 60 MG: 60 TABLET, EXTENDED RELEASE ORAL at 21:17

## 2019-02-07 RX ADMIN — AZATHIOPRINE 50 MG: 50 TABLET ORAL at 21:16

## 2019-02-07 RX ADMIN — APIXABAN 2.5 MG: 2.5 TABLET, FILM COATED ORAL at 21:15

## 2019-02-07 ASSESSMENT — ACTIVITIES OF DAILY LIVING (ADL)
COGNITION: 0 - NO COGNITION ISSUES REPORTED
SWALLOWING: 0-->SWALLOWS FOODS/LIQUIDS WITHOUT DIFFICULTY
BATHING: 2-->ASSISTIVE PERSON
TRANSFERRING: 1-->ASSISTIVE EQUIPMENT
TOILETING: 1-->ASSISTIVE EQUIPMENT
RETIRED_EATING: 0-->INDEPENDENT
RETIRED_COMMUNICATION: 0-->UNDERSTANDS/COMMUNICATES WITHOUT DIFFICULTY
ADLS_ACUITY_SCORE: 21
AMBULATION: 1-->ASSISTIVE EQUIPMENT
FALL_HISTORY_WITHIN_LAST_SIX_MONTHS: NO
DRESS: 0-->INDEPENDENT

## 2019-02-07 ASSESSMENT — MIFFLIN-ST. JEOR: SCORE: 1146.89

## 2019-02-07 ASSESSMENT — ENCOUNTER SYMPTOMS: DYSRHYTHMIAS: 1

## 2019-02-07 ASSESSMENT — COLUMBIA-SUICIDE SEVERITY RATING SCALE - C-SSRS
2. HAVE YOU ACTUALLY HAD ANY THOUGHTS OF KILLING YOURSELF IN THE PAST MONTH?: NO
1. IN THE PAST MONTH, HAVE YOU WISHED YOU WERE DEAD OR WISHED YOU COULD GO TO SLEEP AND NOT WAKE UP?: NO
6. HAVE YOU EVER DONE ANYTHING, STARTED TO DO ANYTHING, OR PREPARED TO DO ANYTHING TO END YOUR LIFE?: NO
2. HAVE YOU ACTUALLY HAD ANY THOUGHTS OF KILLING YOURSELF SINCE LAST CONTACT?: OTHER (SEE COMMENTS)

## 2019-02-07 NOTE — PROGRESS NOTES
MitraClip Procedure:  February 11, 7:30 a.m.    Check in to the hospital 3C at 5:30 a.m.    Nothing to eat after midnight; water, apple juice or 7up/Sprite is OK up to two hours prior to your scheduled procedure.  You may take your medications in the morning with a sip of water.    Take a shower, with antibacterial soap, the night before the procedure, and the morning of the procedure.      Medications Instructions:  --LAST day of Apixaban/Eliquis is Friday, February 8.  --NO apixaban/Eliquis starting February 9.  --TAKE:  Aspirin 325 mg, by mouth, the night before the procedure, AND the morning of the procedure.  --All other medication HOLDS are at the discretion of the PAC.      If any questions please contact:  Zehra Butler RN  Structural Heart Care Coordinator  Pager: [phone]        Date: 2/7/2019    Time of Call: 8:42 AM     Diagnosis:  Severe mitral regurgitation     [ TORB ] Ordering provider: Avila Watson MD  Order:   Pre-mitraclip procedure orders  YOLANDA     Order received by: Gladys Butler RN     Follow-up/additional notes:   Ordered for upcoming MitraClip procedure.

## 2019-02-07 NOTE — ANESTHESIA PREPROCEDURE EVALUATION
Anesthesia Pre-Procedure Evaluation    Patient: Linda Spicer   MRN:     [id number] Gender:   female   Age:    87 year old :      1931        Preoperative Diagnosis: Severe Mitral Regurge   Procedure(s):  Mitraclip Procedure     Past Medical History:   Diagnosis Date     Adjustment disorder with anxious mood 2015     Advanced directives, counseling/discussion 2012    Patient states has Advance Directive and will bring in a copy to clinic. 2012   [facility] Medical Assistant \       Anemia 2015     Basal cell cancer      CHF (congestive heart failure) (H) 2014     CKD (chronic kidney disease) stage 3, GFR 30-59 ml/min (H) 2015     DDD (degenerative disc disease), lumbar 2015     Diffuse idiopathic pulmonary fibrosis (H) 2013     Encounter for palliative care 2015     History of blood transfusion 2015     Hypertension goal BP (blood pressure) < 140/90 2012     Hypothyroid 2012     Irritable bowel syndrome 10/29/2013     Macular degeneration      Macular degeneration, left eye 2013     Nondisplaced spiral fracture of shaft of humerus      Osteoporosis 2013     Imo Update utility     RA (rheumatoid arthritis) (H) 2013     Rheumatic fever      Shingles      Spinal stenosis of lumbar region with neurogenic claudication 2015      Past Surgical History:   Procedure Laterality Date     ANESTHESIA CARDIOVERSION N/A 2018    Procedure: ANESTHESIA CARDIOVERSION;;  Surgeon: GENERIC ANESTHESIA PROVIDER;  Location: [facility]     ANESTHESIA CARDIOVERSION N/A 10/11/2018    Procedure: ANESTHESIA CARDIOVERSION;  Cardioversion;  Surgeon: GENERIC ANESTHESIA PROVIDER;  Location: [facility] OR     ANESTHESIA CARDIOVERSION N/A 10/16/2018    Procedure: Anesthesia Cardioversion ;  Surgeon: GENERIC ANESTHESIA PROVIDER;  Location: [facility] OR     APPENDECTOMY       BIOPSY      hemorrhoidectomy     ENT SURGERY      tonsillectomy     GYN SURGERY      3 D & C's      HYSTERECTOMY, PAP NO LONGER INDICATED       LAMINECTOMY LUMBAR ONE LEVEL N/A 10/13/2015    Procedure: LAMINECTOMY LUMBAR ONE LEVEL;  Surgeon: Fransico Toussaint MD;  Location: [facility] OR          Anesthesia Evaluation     . Pt has had prior anesthetic. Type: General and MAC           ROS/MED HX    ENT/Pulmonary:     (+)JESUS MANUEL risk factors hypertension, , . 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    Neurologic:  - neg neurologic ROS     Cardiovascular:     (+) hypertension--CAD, --. Taking blood thinners Pt has received instructions: Instructions Given to patient: stop eliquis 48 hours pre-op; start 325 mg ASA 1 day pre-op and DOS. CHF etiology: long standing HTN Last EF: 55-60% date: 1/31/19 . RUSSELL, . pacemaker Reason placed: SSS; tachybradycardia:type: Medtronic - Patientt is not dependent on pacemaker . dysrhythmias a-fib, valvular problems/murmurs type: MR . pulmonary hypertension, Previous cardiac testing Echodate:results:date: results:ECG reviewed date: results:Cath date: results:          METS/Exercise Tolerance:  1 - Eating, dressing   Hematologic:     (+) Anemia, History of Transfusion no previous transfusion reaction -      Musculoskeletal:   (+) arthritis, , , other musculoskeletal- rheumatoid arthritis      GI/Hepatic:  - neg GI/hepatic ROS       Renal/Genitourinary:     (+) chronic renal disease, type: CRI, Pt does not require dialysis, Pt has no history of transplant,       Endo:     (+) thyroid problem hypothyroidism, .      Psychiatric:  - neg psychiatric ROS       Infectious Disease:  - neg infectious disease ROS       Malignancy:   (+) Malignancy History of Skin  Skin CA status post Surgery,         Other:    (+) No chance of pregnancy no H/O Chronic Pain,no other significant disability                        PHYSICAL EXAM:   Mental Status/Neuro: A/A/O   Airway: Facies: Feasible  Mallampati: III  Mouth/Opening: Full  TM distance: > 6 cm  Neck ROM: Limited  "  Respiratory: Auscultation: Other    (bilateral basilar crackles)  Resp. Rate: Tachypnea     Resp. Effort: Increased; Nasal Flaring; Positional changes      CV: Rhythm: Regular  Heart: Normal Sounds   Comments:                    Lab Results   Component Value Date    WBC 6.2 01/31/2019    HGB 12.8 01/31/2019    HCT 39.0 01/31/2019     01/31/2019    CRP <2.9 01/16/2019    SED 25 01/17/2019     01/31/2019    POTASSIUM 3.9 01/31/2019    CHLORIDE 106 01/31/2019    CO2 26 01/31/2019    BUN 45 (H) 01/31/2019    CR 1.86 (H) 01/31/2019    GLC 92 01/31/2019    FATOUMATA 8.5 01/31/2019    PHOS 3.8 05/02/2018    MAG 2.1 01/20/2019    ALBUMIN 3.8 01/15/2019    PROTTOTAL 7.8 01/15/2019    ALT 18 01/15/2019    AST 22 01/15/2019    ALKPHOS 59 01/15/2019    BILITOTAL 0.4 01/15/2019    LIPASE 116 11/17/2018    AMYLASE 91 07/07/2015    PTT 28 11/17/2018    INR 1.15 (H) 01/31/2019    TSH 0.73 01/18/2019    T4 1.40 01/18/2019       Preop Vitals  BP Readings from Last 3 Encounters:   02/07/19 130/72   01/31/19 132/62   01/31/19 149/76    Pulse Readings from Last 3 Encounters:   02/07/19 64   01/31/19 69   01/31/19 70      Resp Readings from Last 3 Encounters:   01/31/19 14   01/31/19 16   01/29/19 16    SpO2 Readings from Last 3 Encounters:   02/07/19 95%   01/31/19 96%   01/31/19 99%      Temp Readings from Last 1 Encounters:   02/07/19 98  F (36.7  C)    Ht Readings from Last 1 Encounters:   02/07/19 1.613 m (5' 3.5\")      Wt Readings from Last 1 Encounters:   02/07/19 73.5 kg (162 lb)    Estimated body mass index is 28.25 kg/m  as calculated from the following:    Height as of this encounter: 1.613 m (5' 3.5\").    Weight as of this encounter: 73.5 kg (162 lb).     LDA:            Assessment:   ASA SCORE: 3    NPO Status: > 6 hours since completed Solid Foods   Documentation: H&P complete; Preop Testing complete; Consents complete   Proceeding: Proceed without further delay  Tobacco Use:  NO Active use of Tobacco/UNKNOWN " Tobacco use status     Plan:   Anes. Type:  General   Pre-Induction: Acetaminophen PO   Induction:  IV (Standard)   Airway: Oral ETT   Access/Monitoring: PIV; 2nd PIV     Advanced Monitoring: YOLANDA Adult            ADULT YOLANDA Checklist:               Absolute Contra-Indications: NONE               Relative Contra-Indications:  NONE               Final Plan: Proceed with YOLANDA!   Maintenance: Balanced   Emergence: Procedure Site   Logistics: Same Day Surgery     Postop Pain/Sedation Strategy:  Standard-Options: Opioids PRN     PONV Management:  Adult Risk Factors: Female, Non-Smoker, Postop Opioids     CONSENT: Direct conversation   Plan and risks discussed with: Patient   Blood Products: Consented (ALL Blood Products)                  PAC Discussion and Assessment    ASA Classification: 3  Case is suitable for:   Anesthetic techniques and relevant risks discussed: GA  Invasive monitoring and risk discussed: Yes  Types:   Possibility and Risk of blood transfusion discussed: Yes  NPO instructions given:   Additional anesthetic preparation and risks discussed:   Needs early admission to pre-op area:   Other:     PAC Resident/NP Anesthesia Assessment:  Linda Spicer is an 87 year old female for MitraClip procedure, with Brittney Watson and Dr. Berger on 2/11/19, in treatment of severe mitral regurgitation. PAC referral for risk assessment and optimization for anesthesia with comorbid conditions of HTN, PAF, HFpEF, recent admission for acute on chronic heart failure was 1/24/19, interstitial lung ds, rheumatoid arthritis, pulmonary hypertension, anemia, hypothyroid and chronic renal ds.  Pre-operative considerations include:  1.) CV: Functional status independent in ADLs. Exercise tolerance < 4 METS. Walks with walker. HTN (carvedilol, hydralazine, lasix), HFpEF (lasix, B-blocker), Paroxysmal atrial fibrillation (eliquis, b-blocker, Medtronic pacemaker for sick sinus syndrome / tachybrady) , severe MR. Acute on chronic heart  failure with admission for diuresis 1/24/19.  Last interrogation of pacemaker 2/4/19: not dependent. AF burden 52; battery life 7 years.  Today with increased SOB, RR 36, nasal flaring. PO2 95%. ECHO 1/31/19: LVEF 55-60%, MV degenerative changes. LVH; moderate -severe MR. Moderate tricuspid regurg  Consulted cardiology re: possible admission for cardiopulmonary optimization pre-op. Awaiting evaluation by cardiology fellow, Dr. Estrada.  2.) Pulmonary: Interstitial lung disease/pulmonary fibrosis / RA associated. Acute on chronic respiratory failure / HF as above 1/24/19. Not currently on home O2 or inhaler. More SOB than baseline / RR 36 as noted above    JESUS MANUEL -tested and negative  Cards eval  3.) Heme: Chronic anemia-last HGB 12. Blood transfusion with miscarriage years ago- none since.   VTE risk is elevated due to age - pt on eliquis  Hold eliquis 48 hours pre-op and start 325 ASA day before and DOS per Zehra Butler NP in cardiology-see notes in EMR  4.) Renal: CRI. Creatinine 1.8  Care with medications: alert for dosing and nephrotoxity   5.) MSK: Seropositive rheumatoid arthritis on steroids for 30 yrs until 2002. Managed now by Dr. Cruz with Orencia every 4 weeks (last dose 2 weeks ago) and azothioprine. No narcotics. She is also on an infusion (Lucentis) for macular degeneration.  CMP, CBC, type and screen  Bilateral shoulder impingement syndrome - care with positioning   6.) GI: on zantac which she will continue to DOS.   PONV score = 2 ( 2 or > antiemetic prophylaxis recommended).     Prior anesthesia: Pacemaker 2017. Laminectomy [facility] 2015 - notes in EMR-no problems  Awaiting guidance on possible admission. Pt also seen by Dr. Tyler. See her recommendations  below.                                                                                                                                                                                                                                                                                                                                                                                                                                                                                                                                                                                                                                                                                                                                                                                                                        Reviewed and Signed by PAC Mid-Level Provider/Resident  Mid-Level Provider/Resident: Maty Forman PA-C  Date: 2/7/19  Time: 4:12 PM    Attending Anesthesiologist Anesthesia Assessment:  87 year old for Makayla-clip in management of severe MR. Patient has pAF, HFpEF and pHTN (moderate). In addition she has significant interstitial lung disease. Today in clinic she presents with tachypnea an dsome increasing SOB. We consulted with Driss Ambriz who will admit her to his service and diurese her.        Reviewed and Signed by PAC Anesthesiologist  Anesthesiologist: evelio  Date: 2/7/2019  Time:   Pass/Fail: Pass  Disposition:     PAC Pharmacist Assessment:        Pharmacist:   Date:   Time:        ARTEM Ledezma

## 2019-02-07 NOTE — LETTER
Transition Communication Hand-off for Care Transitions to Next Level of Care Provider  Name: Linda Spicer  : 1931  MRN #: [id number]  Primary Care Provider: Tre Lockett  Primary Clinic: [address]  [place] [address]  Reason for Hospitalization:  Heart failure  Dyspnea  RUSSELL (dyspnea on exertion)  Admit Date/Time: 2019  5:15 PM  Discharge Date: 19  Payor Source: Payor: MEDICARE / Plan: MEDICARE / Product Type: Medicare /   Readmission Assessment Measure (ZAHIRA) Risk Score/category: ZAHIRA very High/95% risk  Reason for Communication Hand-off Referral: Admission diagnoses: CHF  Fragility  Multiple providers/specialties  Discharge Plan: Home with return on  for Mitral clip  Concern for non-adherence with plan of care: No  Follow-up specialty is recommended: Yes    Follow-up plan:    Future Appointments   Date Time Provider Department Center   2019  7:30 AM [id number]   2/15/2019 10:00 AM Asia Steven PA-C [facility] [place]   2019  9:30 AM [facility] [place]   3/1/2019 10:00 AM Asia Steven PA-C [facility] [place]   3/26/2019 10:00 AM [facility] [place]   2019  9:30 AM Minna Gallagher APRN CNP [facility]   2019 12:00 AM [facility]   2019 10:00 AM Mario Davenport MD [facility] [place] [facility]       Any outstanding tests or procedures:        Referrals     Future Labs/Procedures    Medication Therapy Management Referral     Comments:    MTM referral reason            Patient had a hospital or ED visit in last 6 months and has more than 10   PTA or Discharge medications    Patient has 5 PTA or Discharge Medications AND one of the following   diagnoses: DM,HF,COPD,AMI DX,PULM HTN       This service is designed to help you get the most from your medications.  A specially trained pharmacist will work closely with you and your doctors  to solve any problems related to your  medications and to help you get the   best results from taking them.      The Medication Therapy Management staff will call you to schedule an appointment.          Key Recommendations:  Patient admitted for potentially worsening CHF as she was having dyspnea. Found to not be fluid overloaded. Changed to observation status during stay and discharged home with planned f/u for Mitral Clip Procedure. SW did meet with patient and son for clarification of HCD.      John Linda RN Care Coordinator    AVS/Discharge Summary is the source of truth; this is a helpful guide for improved communication of patient story

## 2019-02-07 NOTE — H&P
"  Pre-Operative H & P     CC:  Preoperative exam to assess for increased cardiopulmonary risk while undergoing surgery and anesthesia.    Date of Encounter: 2/7/2019  Primary Care Physician:  Tre Lockett  Linda Spicer is a 87 year old female who presents for pre-operative H & P in preparation for *** with  *** on { :[id number]} at {[employer].:[id number]::\"[facility]\",\"[facility]\",\"[facility] [place] [facility]\"}. ***    {   History obtained from:[id number]::\"History is obtained from the patient\"}.     Past Medical History  Past Medical History:   Diagnosis Date     Adjustment disorder with anxious mood 5/18/2015     Advanced directives, counseling/discussion 8/30/2012    Patient states has Advance Directive and will bring in a copy to clinic. 8/30/2012   [facility] Medical Assistant \       Anemia 9/25/2015     Basal cell cancer      CHF (congestive heart failure) (H) 9/18/2014     CKD (chronic kidney disease) stage 3, GFR 30-59 ml/min (H) 9/29/2015     DDD (degenerative disc disease), lumbar 9/25/2015     Diffuse idiopathic pulmonary fibrosis (H) 5/6/2013     Encounter for palliative care 5/18/2015     History of blood transfusion 9/29/2015     Hypertension goal BP (blood pressure) < 140/90 9/7/2012     Hypothyroid 9/7/2012     Macular degeneration, 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     Osteoporosis 8/13/2013     RA (rheumatoid arthritis) (H) 5/7/2013     Rheumatic fever age 17      Shingles      Spinal stenosis of lumbar region with neurogenic claudication 9/14/2015       Past Surgical History  Past Surgical History:   Procedure Laterality Date     ANESTHESIA CARDIOVERSION N/A 9/14/2018    Procedure: ANESTHESIA CARDIOVERSION;;  Surgeon: GENERIC ANESTHESIA PROVIDER;  Location: [facility] OR     ANESTHESIA CARDIOVERSION N/A 10/11/2018    Procedure: ANESTHESIA CARDIOVERSION;  Cardioversion;  Surgeon: GENERIC " ANESTHESIA PROVIDER;  Location: [facility] OR     ANESTHESIA CARDIOVERSION N/A 10/16/2018    Procedure: Anesthesia Cardioversion ;  Surgeon: GENERIC ANESTHESIA PROVIDER;  Location: [facility] OR     APPENDECTOMY       BIOPSY      hemorrhoidectomy     ENT SURGERY      tonsillectomy     GYN SURGERY      3 D & C's     HYSTERECTOMY, PAP NO LONGER INDICATED       LAMINECTOMY LUMBAR ONE LEVEL N/A 10/13/2015    Procedure: LAMINECTOMY LUMBAR ONE LEVEL;  Surgeon: Fransico Toussaint MD;  Location: [facility] OR       Hx of Blood transfusions/reactions: yes years ago     Hx of abnormal bleeding or anti-platelet use: yes on eliquis    Menstrual history: No LMP recorded (lmp unknown). Patient has had a hysterectomy.    Steroid use in the last year: no    Personal or FH with difficulty with Anesthesia:  no    Prior to Admission Medications  Current Outpatient Medications   Medication Sig Dispense Refill     abatacept (ORENCIA) 250 MG injection Inject 750 mg into the vein every 28 days 30 mL 0     acetaminophen (TYLENOL) 500 MG tablet Take 1 tablet (500 mg) by mouth every 6 hours as needed for mild pain or fever (Patient not taking: Reported on 2/7/2019)       albuterol (PROVENTIL) (2.5 MG/3ML) 0.083% neb solution Take 1 vial (2.5 mg) by nebulization every 6 hours as needed for shortness of breath / dyspnea or wheezing (Patient not taking: Reported on 2/7/2019) 360 mL      apixaban ANTICOAGULANT (ELIQUIS) 2.5 MG tablet Take 1 tablet (2.5 mg) by mouth 2 times daily 60 tablet 0     azaTHIOprine (IMURAN) 50 MG tablet Take 1 tablet (50 mg) by mouth daily (Patient taking differently: Take 50 mg by mouth every evening ) 30 tablet 0     calcium carbonate-vitamin D (OS-FATOUMATA) 500-400 MG-UNIT tablet Take 1 tablet by mouth daily 30 tablet 0     Carboxymethylcellulose Sod PF (CELLUVISC/REFRESH LIQUIGEL) 1 % ophthalmic gel Place 1 drop into both eyes daily as needed for dry eyes 1 Bottle      carvedilol (COREG) 3.125 MG tablet Take 1 tablet (3.125 mg) by mouth 2  times daily (with meals) 60 tablet 0     cetirizine (ZYRTEC ALLERGY) 10 MG tablet Take 1 tablet (10 mg) by mouth At Bedtime 30 tablet 0     denosumab (PROLIA) 60 MG/ML SOLN injection Inject 1 mL (60 mg) Subcutaneous every 6 months 1 mL      diphenhydrAMINE (BENADRYL) 25 MG tablet Take 25 mg by mouth as needed Patient takes 25-50mg 1-2 times a day.       fish oil-omega-3 fatty acids 1000 MG capsule Take 1 capsule (1 g) by mouth 2 times daily       folic acid (FOLVITE) 1 MG tablet Take 1 tablet (1 mg) by mouth daily 30 tablet 0     furosemide (LASIX) 20 MG tablet Take 1 tablet (20 mg) by mouth 2 times daily Hold Lasix 12/18 and 12/19, then start 20 mg daily after that. (Patient taking differently: 40mg by mouth every morning and 20mg by mouth every afternoon) 60 tablet 0     hydrALAZINE (APRESOLINE) 10 MG tablet Take 10 mg by mouth 3 times daily       isosorbide mononitrate (IMDUR) 60 MG 24 hr tablet Take 1 tablet (60 mg) by mouth daily 30 tablet 0     levothyroxine (SYNTHROID/LEVOTHROID) 75 MCG tablet Take 1 tablet (75 mcg) by mouth daily 30 tablet 0     Multiple Vitamins-Minerals (PRESERVISION AREDS) CAPS Take 1 capsule by mouth 2 times daily 30 capsule      nitroGLYcerin (NITROSTAT) 0.4 MG sublingual tablet Place 1 tablet (0.4 mg) under the tongue every 5 minutes as needed for chest pain (Patient not taking: Reported on 2/7/2019) 25 tablet 11     order for DME Dispense SP Walker-small 1 each 0     order for DME Equipment being ordered: Dispense baffle, for use with nebulizer. 1 each 0     order for DME Equipment being ordered: Nebulizer. Use with Albuterol. 1 each 0     order for DME Equipment being ordered: Dispense face mask.  Mrs. Spicer is immunosuppressed due to rheumatoid arthritis. 1 Box 11     ranibizumab (LUCENTIS) 0.3 MG/0.05ML SOLN 0.05 mLs (0.3 mg) by Intravitreal route See Admin Instructions Every 6 weeks       ranitidine (ZANTAC) 150 MG tablet Take 1 tablet (150 mg) by mouth 2 times daily (Patient  taking differently: Take 150 mg by mouth At Bedtime ) 60 tablet 0     saccharomyces boulardii (FLORASTOR) 250 MG capsule Take 1 capsule (250 mg) by mouth daily 30 capsule 0     senna-docusate (SENOKOT-S/PERICOLACE) 8.6-50 MG tablet Take 1 tablet by mouth daily as needed for constipation 100 tablet      trimethoprim (TRIMPEX) 100 MG tablet Take 0.5 tablets (50 mg) by mouth daily 15 tablet 0     vitamin C (ASCORBIC ACID) 500 MG tablet Take 1 tablet (500 mg) by mouth daily 30 tablet 0       Allergies  Allergies   Allergen Reactions     Cephalexin Hcl Diarrhea     Gabapentin Other (See Comments)     Dizzsiness     Naproxen GI Disturbance     Perfume      Macrobid [Nitrofurantoin Anhydrous]      Possibly related to lung disease      Seasonal Allergies      Sulfa Drugs      Throat swelling     Xanax [Alprazolam] Other (See Comments)     Dizziness      Ciprofloxacin Itching and Rash       Social History  Social History     Socioeconomic History     Marital status:      Number of children: 6    Tobacco Use     Smoking status: Never Smoker     Smokeless tobacco: Never Used   Substance and Sexual Activity     Alcohol use: Yes     Comment: rare wine      Drug use: No     Sexual activity: No   Other Topics Concern     Parent/sibling w/ CABG, MI or angioplasty before 65F 55M? No   Social History Narrative    . Has six children. She enjoys [employer] and genealogy.  passed away.  Her son has financial and alcohol issues.       Family History  Family History   Problem Relation Age of Onset     Hypertension Mother      Psychotic Disorder Father      Diabetes Son      Diabetes Daughter      Blood Disease Daughter        Review of Systems  Preprocedure Note     Last edited 02/07/19 1512 by Maty Forman PA  Date of Service 02/07/19 1439  Creation Time 02/07/19 1439             Anesthesia Pre-Procedure Evaluation    Patient: Linda Spicer   MRN:     [id number] Gender:   female   Age:    87 year old  :      1931        Preoperative Diagnosis: Severe Mitral Regurge   Procedure(s):  Mitraclip Procedure     Past Medical History:   Diagnosis Date     Adjustment disorder with anxious mood 2015     Advanced directives, counseling/discussion 2012    Patient states has Advance Directive and will bring in a copy to clinic. 2012   [facility] Medical Assistant \       Anemia 2015     Basal cell cancer      CHF (congestive heart failure) (H) 2014     CKD (chronic kidney disease) stage 3, GFR 30-59 ml/min (H) 2015     DDD (degenerative disc disease), lumbar 2015     Diffuse idiopathic pulmonary fibrosis (H) 2013     Encounter for palliative care 2015     History of blood transfusion 2015     Hypertension goal BP (blood pressure) < 140/90 2012     Hypothyroid 2012     Irritable bowel syndrome 10/29/2013     Macular degeneration      Macular degeneration, left eye 2013     Nondisplaced spiral fracture of shaft of humerus      Osteoporosis 2013     Imo Update utility     RA (rheumatoid arthritis) (H) 2013     Rheumatic fever      Shingles      Spinal stenosis of lumbar region with neurogenic claudication 2015      Past Surgical History:   Procedure Laterality Date     ANESTHESIA CARDIOVERSION N/A 2018    Procedure: ANESTHESIA CARDIOVERSION;;  Surgeon: GENERIC ANESTHESIA PROVIDER;  Location: [facility]     ANESTHESIA CARDIOVERSION N/A 10/11/2018    Procedure: ANESTHESIA CARDIOVERSION;  Cardioversion;  Surgeon: GENERIC ANESTHESIA PROVIDER;  [facility]     ANESTHESIA CARDIOVERSION N/A 10/16/2018    Procedure: Anesthesia Cardioversion ;  Surgeon: GENERIC ANESTHESIA PROVIDER;  Location[facility]     APPENDECTOMY       BIOPSY      hemorrhoidectomy     ENT SURGERY      tonsillectomy     GYN SURGERY      3 D & C's     HYSTERECTOMY, PAP NO LONGER INDICATED       LAMINECTOMY LUMBAR ONE LEVEL N/A 10/13/2015    Procedure: LAMINECTOMY LUMBAR ONE  LEVEL;  Surgeon: Fransico Toussaint MD;  Location: [facility] OR          Anesthesia Evaluation     . Pt has had prior anesthetic. Type: General and MAC           ROS/MED HX    ENT/Pulmonary:     (+)JESUS MANUEL risk factors hypertension, , . 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    Neurologic:  - neg neurologic ROS     Cardiovascular:     (+) hypertension--CAD, --. Taking blood thinners Pt has received instructions: Instructions Given to patient: stop eliquis 48 hours pre-op; start 325 mg ASA 1 day pre-op and DOS. CHF etiology: long standing HTN Last EF: 55-60% date: 1/31/19 . RUSSELL, . pacemaker Reason placed: SSS; tachybradycardia:type: Medtronic - Patientt is not dependent on pacemaker . dysrhythmias a-fib, valvular problems/murmurs type: MR . pulmonary hypertension, Previous cardiac testing Echodate:results:date: results:ECG reviewed date: results:Cath date: results:          METS/Exercise Tolerance:  1 - Eating, dressing   Hematologic:     (+) Anemia, History of Transfusion no previous transfusion reaction -      Musculoskeletal:   (+) arthritis, , , other musculoskeletal- rheumatoid arthritis      GI/Hepatic:  - neg GI/hepatic ROS       Renal/Genitourinary:     (+) chronic renal disease, type: CRI, Pt does not require dialysis, Pt has no history of transplant,       Endo:     (+) thyroid problem hypothyroidism, .      Psychiatric:  - neg psychiatric ROS       Infectious Disease:  - neg infectious disease ROS       Malignancy:   (+) Malignancy History of Skin  Skin CA status post Surgery,         Other:    (+) No chance of pregnancy no H/O Chronic Pain,no other significant disability                    JZG FV AN PHYSICAL EXAM    Lab Results   Component Value Date    WBC 6.2 01/31/2019    HGB 12.8 01/31/2019    HCT 39.0 01/31/2019     01/31/2019    CRP <2.9 01/16/2019    SED 25 01/17/2019     01/31/2019    POTASSIUM 3.9 01/31/2019    CHLORIDE 106 01/31/2019    CO2 26  "01/31/2019    BUN 45 (H) 01/31/2019    CR 1.86 (H) 01/31/2019    GLC 92 01/31/2019    FATOUMATA 8.5 01/31/2019    PHOS 3.8 05/02/2018    MAG 2.1 01/20/2019    ALBUMIN 3.8 01/15/2019    PROTTOTAL 7.8 01/15/2019    ALT 18 01/15/2019    AST 22 01/15/2019    ALKPHOS 59 01/15/2019    BILITOTAL 0.4 01/15/2019    LIPASE 116 11/17/2018    AMYLASE 91 07/07/2015    PTT 28 11/17/2018    INR 1.15 (H) 01/31/2019    TSH 0.73 01/18/2019    T4 1.40 01/18/2019       Preop Vitals  BP Readings from Last 3 Encounters:   02/07/19 130/72   01/31/19 132/62   01/31/19 149/76    Pulse Readings from Last 3 Encounters:   02/07/19 64   01/31/19 69   01/31/19 70      Resp Readings from Last 3 Encounters:   01/31/19 14   01/31/19 16   01/29/19 16    SpO2 Readings from Last 3 Encounters:   02/07/19 95%   01/31/19 96%   01/31/19 99%      Temp Readings from Last 1 Encounters:   02/07/19 98  F (36.7  C)    Ht Readings from Last 1 Encounters:   02/07/19 1.613 m (5' 3.5\")      Wt Readings from Last 1 Encounters:   02/07/19 73.5 kg (162 lb)    Estimated body mass index is 28.25 kg/m  as calculated from the following:    Height as of this encounter: 1.613 m (5' 3.5\").    Weight as of this encounter: 73.5 kg (162 lb).     LDA:            JZG FV AN PLAN NO PONV RULE                ARTEM Ledezma     Revision History       Date/Time User Provider Type Action    > 2/7/2019  3:12 PM Maty Forman PA Physician Assistant Addend     2/7/2019  2:42 PM Maty Forman PA Physician Assistant Sign                The complete review of systems is negative other than noted in the HPI or here.   Temp: 98  F (36.7  C)   BP: 130/72 Pulse: 64     SpO2: 95 %         162 lbs 0 oz  5' 3.5\"   Body mass index is 28.25 kg/m .       Physical Exam  Constitutional: Awake, alert, cooperative, no apparent distress, and appears stated age.  Eyes: Pupils equal, round and reactive to light, extra ocular muscles intact, sclera clear, conjunctiva " "normal.  HENT: Normocephalic, oral pharynx with moist mucus membranes, good dentition. No goiter appreciated.   Respiratory: Clear to auscultation bilaterally, no crackles or wheezing.  Cardiovascular: Regular rate and rhythm, normal S1 and S2, and no murmur noted.  Carotids +2, no bruits. No edema. Palpable pulses to radial  DP and PT arteries.   GI: Normal bowel sounds, soft, non-distended, non-tender, no masses palpated, no hepatosplenomegaly.  Surgical scars: ***  Lymph/Hematologic: No cervical lymphadenopathy and no supraclavicular lymphadenopathy.  Genitourinary:  ***  Skin: Warm and dry.  No rashes at anticipated surgical site.   Musculoskeletal: Full ROM of neck. There is no redness, warmth, or swelling of the joints. Gross motor strength is normal.    Neurologic: Awake, alert, oriented to name, place and time. Cranial nerves II-XII are grossly intact. Gait is normal.   Neuropsychiatric: Calm, cooperative. Normal affect.     Labs: (personally reviewed)  EKG: Personally reviewed but formal cardiology read pending: ***  Cardiac echo: ***  Stress test: ***  PFT's: ***    Outside records reviewed from: ***    ASSESSMENT and PLAN  Linda Spicer is a 87 year old female scheduled to undergo ***. {He/She:[id number]} has the following specific operative considerations:   - RCRI : {PREOP REVISED CARDIAC INDEX (RCI):908691::\"No serious cardiac risks\"}.  *** risk of major adverse cardiac event.   - Anesthesia considerations:  Refer to PAC assessment in anesthesia records  - VTE risk:  - JESUS MANUEL # of risks ***/8 = ***  - Post-op delirium risk: ***  - If afib, [id number] score ***.  Risk category ***.    - Risk of PONV score = ***.  If > 2, anti-emetic intervention recommended.  - If on chronic opiates, morphine equivalent = *** (if > 60mg/24 hr--> needs pain team consult)    Patient was discussed with { PAC Providers:[id number]}.    ARTEM Ledezma  Preoperative Assessment Center  [facility] [place]  [facility]  Phone: [phone]  Fax: [phone]

## 2019-02-07 NOTE — PHARMACY - PREOPERATIVE ASSESSMENT CENTER
Anticoagulation Note - Preoperative Assessment Center (PAC) Pharmacist     Patient seen and interviewed during time of PAC Clinic appointment February 7, 2019.  The purpose of this note is to document the perioperative anticoagulation plan outlined by the providers caring for Linda Spicer.     Current Regimen  Anticoagulation Regimen as of February 7, 2019: apixaban 2.5mg by mouth twice daily   Indication: afib  Expected Duration of therapy: indefinite    Perioperative plan  Linda Spicer is scheduled for mitralclip on 2/11/19 with Dr. Watson and the perioperative anticoagulation plan outlined by her procedural team is to to hold the apixaban 48 hours prior to the procedure. She is to take a 325mg aspirin the evening prior to the procedure and the morning of the procedure.      Resumption of anticoagulation after procedure will be based on surgery team assessment of bleeding risks and complications.  This plan may require re-assessment and modification by her primary team in the perioperative setting depending on patients clinical situation.        Kamar Samuel RPH  February 7, 2019  2:43 PM

## 2019-02-07 NOTE — PROGRESS NOTES
Preoperative Assessment Center medication history for February 7, 2019 is complete.    See Epic admission navigator for prior to admission medications.   Operating room staff will still need to confirm medications and last dose information on day of surgery.     Medication history interview sources:  Patient INterview with her medication list    Changes made to PTA medication list (reason)  Added: None    Deleted:   -- cefdinir - finished course  -- doxycycline - finished course    Changed:   -- hydralazine dose updated to 10mg    Additional medication history information (including reliability of information, actions taken by pharmacist):    -- No recent (within 30 days) course of steroids  -- No recent (within 30 days) chronic daily medications stopped   -- Patient declines being on any other prescription or over-the-counter medications  -- patient last received her lucentis on 1/2/18  -- patient last received her prolia in Nov/2018  -- patient last received her abatacept on 1/24/19  -- patient no chronic trimethoprim for UTI ppx    Prior to Admission medications    Medication Sig Last Dose Taking? Auth Provider   abatacept (ORENCIA) 250 MG injection Inject 750 mg into the vein every 28 days Taking Yes Mariola Angeles MD   apixaban ANTICOAGULANT (ELIQUIS) 2.5 MG tablet Take 1 tablet (2.5 mg) by mouth 2 times daily Taking Yes Mariola Angeles MD   azaTHIOprine (IMURAN) 50 MG tablet Take 1 tablet (50 mg) by mouth daily  Patient taking differently: Take 50 mg by mouth every evening  Taking Yes Mariola Angeles MD   calcium carbonate-vitamin D (OS-FATOUMATA) 500-400 MG-UNIT tablet Take 1 tablet by mouth daily Taking Yes Mariola Angeles MD   Carboxymethylcellulose Sod PF (CELLUVISC/REFRESH LIQUIGEL) 1 % ophthalmic gel Place 1 drop into both eyes daily as needed for dry eyes Taking Yes Mariola Angeles MD   carvedilol (COREG) 3.125 MG tablet Take 1 tablet (3.125 mg) by mouth 2 times daily (with meals)  Taking Yes Mariola Angeles MD   cetirizine (ZYRTEC ALLERGY) 10 MG tablet Take 1 tablet (10 mg) by mouth At Bedtime Taking Yes Mariola Angeles MD   denosumab (PROLIA) 60 MG/ML SOLN injection Inject 1 mL (60 mg) Subcutaneous every 6 months Taking Yes Peggy Ramos MD   diphenhydrAMINE (BENADRYL) 25 MG tablet Take 25 mg by mouth as needed Patient takes 25-50mg 1-2 times a day. Taking Yes Reported, Patient   fish oil-omega-3 fatty acids 1000 MG capsule Take 1 capsule (1 g) by mouth 2 times daily Taking Yes Mariola Angeles MD   folic acid (FOLVITE) 1 MG tablet Take 1 tablet (1 mg) by mouth daily Taking Yes Mariola Angeles MD   furosemide (LASIX) 20 MG tablet Take 1 tablet (20 mg) by mouth 2 times daily Hold Lasix 12/18 and 12/19, then start 20 mg daily after that.  Patient taking differently: 40mg by mouth every morning and 20mg by mouth every afternoon Taking Yes Mariola Angeles MD   hydrALAZINE (APRESOLINE) 10 MG tablet Take 10 mg by mouth 3 times daily Taking Yes Unknown, Entered By History   isosorbide mononitrate (IMDUR) 60 MG 24 hr tablet Take 1 tablet (60 mg) by mouth daily Taking Yes Mariola Angeles MD   levothyroxine (SYNTHROID/LEVOTHROID) 75 MCG tablet Take 1 tablet (75 mcg) by mouth daily Taking Yes Mariola Angeles MD   Multiple Vitamins-Minerals (PRESERVISION AREDS) CAPS Take 1 capsule by mouth 2 times daily Taking Yes Mariola Angeles MD   ranibizumab (LUCENTIS) 0.3 MG/0.05ML SOLN 0.05 mLs (0.3 mg) by Intravitreal route See Admin Instructions Every 6 weeks Taking Yes Mariola Angeles MD   ranitidine (ZANTAC) 150 MG tablet Take 1 tablet (150 mg) by mouth 2 times daily  Patient taking differently: Take 150 mg by mouth At Bedtime  Taking Yes Mariola Angeles MD   saccharomyces boulardii (FLORASTOR) 250 MG capsule Take 1 capsule (250 mg) by mouth daily Taking Yes Mariola Angeles MD   senna-docusate (SENOKOT-S/PERICOLACE) 8.6-50 MG tablet Take 1  tablet by mouth daily as needed for constipation Taking Yes Mariola Angeles MD   trimethoprim (TRIMPEX) 100 MG tablet Take 0.5 tablets (50 mg) by mouth daily Taking Yes Mariola Angeles MD   vitamin C (ASCORBIC ACID) 500 MG tablet Take 1 tablet (500 mg) by mouth daily Taking Yes Mariola Angeles MD   acetaminophen (TYLENOL) 500 MG tablet Take 1 tablet (500 mg) by mouth every 6 hours as needed for mild pain or fever  Patient not taking: Reported on 2/7/2019 Not Taking  Mariola Angeles MD   albuterol (PROVENTIL) (2.5 MG/3ML) 0.083% neb solution Take 1 vial (2.5 mg) by nebulization every 6 hours as needed for shortness of breath / dyspnea or wheezing  Patient not taking: Reported on 2/7/2019 Not Taking  Mariola Angeles MD   nitroGLYcerin (NITROSTAT) 0.4 MG sublingual tablet Place 1 tablet (0.4 mg) under the tongue every 5 minutes as needed for chest pain  Patient not taking: Reported on 2/7/2019 Not Taking  Mariola Angeles MD   order for DME Dispense SP Tre Pate MD   order for DME Equipment being ordered: Dispense baffle, for use with nebulizer.   Tre Lockett MD   order for DME Equipment being ordered: Nebulizer. Use with Albuterol.   Tre Lockett MD   order for DME Equipment being ordered: Dispense face mask.  Mrs. Spicer is immunosuppressed due to rheumatoid arthritis.   Tre Lockett MD         Medication history completed by: Kamar Samuel [facility]

## 2019-02-07 NOTE — H&P
Cardiology History and Physical      Patient Name: Linda Spicer MRN# [id number]   Age: 87 year old YOB: 1931     Date of Admission:(Not on file)             Assessment and Plan:     88 yo lady with moderate-severe mitral regurgitation, HFpEF (EF 55-60%) with restrictive physiology on recent cath, atrial fibrillation, tachy-lisa syndrome s/p dual chamber pacemaker, Pulmonary fibrosis from rheumatoid arthritis (low normal diffusion on most recent PFTs), mild-moderate primary pulmonary hypertension CKD baseline Cr 1.5-1.8, HTN is here for shortness of breath. She was planned mireille-clip for Monday.    # Gradually worsening dyspnea- does not appear in CHF, some BP values were elevated at home so high afterload could be making MR worse. Other possibilities include pulmonary hypertension. afib not likely since afib burden was no significantly elevated compared to prior device check    # Moderate-severe mitral regurgitation    # HFpEF (EF 55-60%) with restrictive physiology on recent cath,     # Mild-moderate primary pulmonary hypertension likely from PF rom RA    # Atrial fibrillation on apixaban at home,    # Tachy-lisa syndrome s/p dual chamber pacemaker,    # Pulmonary fibrosis from rheumatoid arthritis,     # CKD baseline Cr 1.5-1.8    # HTN    Plan  - labs including BNP  - CXR  - may consider RHC if etiology not clear  - Optimize preload: resume home lasix 20mg PO BID. May give IV diuresis if testing above suggests volume overload  -Optmize afterload: Asked son to bring home BP values. May need to adjust these meds if BP has been high at home. continue home carvedilol 3.125 BID, Hydralazine 12.5 TID, Imdur 60  - resume apixaban 2.5mg BID for Afib    - continue home RA meds      FEN/GI/: diet  VTE prophylaxis: Apixaban  Code status: Spent long time talking with patient and son to clarify this. They are going to bring their legal document tomorrow. My understanding is that they are ok to intubate  for respiratory arrest. No interventions for cardiac arrest.  Disposition: pending stability           Chief Complaint:   Shortness of breath         HPI:   88 yo lady with moderate-severe mitral regurgitation, HFpEF (EF 55-60%) with restrictive physiology on recent cath, atrial fibrillation, tachy-lisa syndrome s/p dual chamber pacemaker, Pulmonary fibrosis from rheumatoid arthritis (low normal diffusion on most recent PFTs), mild-moderate primary pulmonary hypertension CKD baseline Cr 1.5-1.8, HTN is here for shortness of breath. She was planned mireille-clip for Monday.    Since August, she had had progressive dyspnea with activity. Her weights have been stable ~157 lbs. Wt at discharge in January was 161 lbs. Compliant with meds and low salt diet, no orthopnea, chest pain, palpitations or presyncope. She has mild leg edema which is stable. She has noted her belly is bigger sine last Summer.         Past Medical History:     Past Medical History:   Diagnosis Date     Adjustment disorder with anxious mood 5/18/2015     Advanced directives, counseling/discussion 8/30/2012    Patient states has Advance Directive and will bring in a copy to clinic. 8/30/2012   Tevin May  [facility] Medical Assistant \       Anemia 9/25/2015     Basal cell cancer      CHF (congestive heart failure) (H) 9/18/2014     CKD (chronic kidney disease) stage 3, GFR 30-59 ml/min (H) 9/29/2015     DDD (degenerative disc disease), lumbar 9/25/2015     Diffuse idiopathic pulmonary fibrosis (H) 5/6/2013     Encounter for palliative care 5/18/2015     History of blood transfusion 9/29/2015     Hypertension goal BP (blood pressure) < 140/90 9/7/2012     Hypothyroid 9/7/2012     Irritable bowel syndrome 10/29/2013     Macular degeneration      Macular degeneration, 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     Nondisplaced spiral fracture of shaft of humerus      Osteoporosis 8/13/2013     Imo Update utility     RA (rheumatoid arthritis) (H) 5/7/2013     Rheumatic fever       Shingles      Spinal stenosis of lumbar region with neurogenic claudication 9/14/2015              Past Surgical History:     Past Surgical History:   Procedure Laterality Date     ANESTHESIA CARDIOVERSION N/A 9/14/2018    Procedure: ANESTHESIA CARDIOVERSION;;  Surgeon: GENERIC ANESTHESIA PROVIDER;  Location: [facility] OR     ANESTHESIA CARDIOVERSION N/A 10/11/2018    Procedure: ANESTHESIA CARDIOVERSION;  Cardioversion;  Surgeon: GENERIC ANESTHESIA PROVIDER;  Location: [facility] OR     ANESTHESIA CARDIOVERSION N/A 10/16/2018    Procedure: Anesthesia Cardioversion ;  Surgeon: GENERIC ANESTHESIA PROVIDER;  Location: [facility] OR     APPENDECTOMY       BIOPSY      hemorrhoidectomy     ENT SURGERY      tonsillectomy     GYN SURGERY      3 D & C's     HYSTERECTOMY, PAP NO LONGER INDICATED       LAMINECTOMY LUMBAR ONE LEVEL N/A 10/13/2015    Procedure: LAMINECTOMY LUMBAR ONE LEVEL;  Surgeon: Fransico Toussaint MD;  Location: [facility] OR              Social History:     Social History     Socioeconomic History     Marital status:      Spouse name: Not on file     Number of children: Not on file     Years of education: Not on file     Highest education level: Not on file   Social Needs     Financial resource strain: Not on file     Food insecurity - worry: Not on file     Food insecurity - inability: Not on file     Transportation needs - medical: Not on file     Transportation needs - non-medical: Not on file   Occupational History     Not on file   Tobacco Use     Smoking status: Never Smoker     Smokeless tobacco: Never Used   Substance and Sexual Activity     Alcohol use: Yes     Comment: rare wine      Drug use: No     Sexual activity: No   Other Topics Concern     Parent/sibling w/ CABG, MI or angioplasty before 65F 55M? No   Social History Narrative    . Has six children. She enjoys bridge and genealogy.  passed away.  Her son has financial and alcohol issues.            Family History:     Family History   Problem  Relation Age of Onset     Hypertension Mother      Psychotic Disorder Father      Diabetes Son      Diabetes Daughter      Blood Disease Daughter                 Allergies:      Allergies   Allergen Reactions     Cephalexin Hcl Diarrhea     Gabapentin Other (See Comments)     Dizzsiness     Naproxen GI Disturbance     Perfume      Macrobid [Nitrofurantoin Anhydrous]      Possibly related to lung disease      Seasonal Allergies      Sulfa Drugs      Throat swelling     Xanax [Alprazolam] Other (See Comments)     Dizziness      Ciprofloxacin Itching and Rash            Medications:     No medications prior to admission.             Review of Systems:   A 14 point ROS was completed and is negative other than what is stated in the HPI.          Physical Exam:     Gen: in no acute distress   Head: atraumatic   Eyes: EOM intact   Mouth: no pharyngeal exudate   Lymph node: not enlarged on head or neck   CV: RRR, S1 & S2, systolic murmur at apex, JVP 6cm (not elevated)  Lungs: fine crackles in lower bases bilaterally otherwise clear  Abd: soft, nontender, BS present   Ext: mild leg edema bilaterally             Data:   Laboratory data reviewed.     CBC RESULTS:   Recent Labs   Lab Test 01/31/19  0819   WBC 6.2   RBC 3.88   HGB 12.8   HCT 39.0   *   MCH 33.0   MCHC 32.8   RDW 13.2        Last Comprehensive Metabolic Panel:  Sodium   Date Value Ref Range Status   01/31/2019 141 133 - 144 mmol/L Final     Potassium   Date Value Ref Range Status   01/31/2019 3.9 3.4 - 5.3 mmol/L Final     Chloride   Date Value Ref Range Status   01/31/2019 106 94 - 109 mmol/L Final     Carbon Dioxide   Date Value Ref Range Status   01/31/2019 26 20 - 32 mmol/L Final     Anion Gap   Date Value Ref Range Status   01/31/2019 9 3 - 14 mmol/L Final     Glucose   Date Value Ref Range Status   01/31/2019 92 70 - 99 mg/dL Final     Urea Nitrogen   Date Value Ref Range Status   01/31/2019 45 (H) 7 - 30 mg/dL Final     Creatinine   Date  Value Ref Range Status   01/31/2019 1.86 (H) 0.52 - 1.04 mg/dL Final     GFR Estimate   Date Value Ref Range Status   01/31/2019 24 (L) >60 mL/min/[1.73_m2] Final     Comment:     Non  GFR Calc  Starting 12/18/2018, serum creatinine based estimated GFR (eGFR) will be   calculated using the Chronic Kidney Disease Epidemiology Collaboration   (CKD-EPI) equation.       Calcium   Date Value Ref Range Status   01/31/2019 8.5 8.5 - 10.1 mg/dL Final     Bilirubin Total   Date Value Ref Range Status   01/15/2019 0.4 0.2 - 1.3 mg/dL Final     Alkaline Phosphatase   Date Value Ref Range Status   01/15/2019 59 40 - 150 U/L Final     ALT   Date Value Ref Range Status   01/15/2019 18 0 - 50 U/L Final     AST   Date Value Ref Range Status   01/15/2019 22 0 - 45 U/L Final           Imaging/Studies reviewed.    EKG 1/31: V paced rhythm with underlying afib    YOLANDA 1/31  Interpretation Summary  Global and regional left ventricular function is normal with an EF of 55-60%.  Global longitudinal strain is -21.8% (normal is less than -18.0%)  Mitral valve with degenerative changes and there is moderate to severe  regurgitation.  Regurgitation primarily from mid and medial scallops. ERO 0.32 cm2 and RVol 59  mL (PISA). Systolic flow reversal noted in 1 out of 4 pulmonary veins (LLPV).     Compared to TTE study from 1/16/19, mitral regurgitation is worse.    HEMODYNAMICS 1/31:  BSA 1.83  1. HR 67 bpm  2. /83/118 mmHg  3. RA 7/8/6   4. RV 46/5  5. PA 46/18/32   6. PCW 16/16/13   7. PA sat 61%   8. PCW sat --%  9. Hgb 12 g/dL   10. Adrienne CO 2.5   11. Adrienne CI 1.4   12. TD CO 2.7   13. TD CI 1.4   14. PVR 7.8       LEFT HEART CATHETERIZATION:  1. LVEDP 15 mmHg.  2. No gradient across the aortic valve on pull back.  3. 1.0 liter of normal saline was given prior to performing simultaneous LV/RV pressures to evaluate for restrictive/constrictive physiology given markedly reduced cardiac index of 1.4 by Adrienne and TD. There was  equalization of LV and RV end diastolic pressure at 15 mmHg after the fluid challenge with concordant respiratory variability suggestive of restrictive physiology.    SUMMARY:   Normal left and right sided filling pressures.  Mild pulmonary hypertension, primary  Reduced cardiac output.  Restrictive physiology / cardiomyopathy.  Mild non obstructive coronary artery disease.        Pacemaker check 2/5  Remote pacemaker transmission received and reviewed. Device transmission sent per MD orders. Patient has a Medtronic dual lead pacemaker. Normal pacemaker function. 17 AT/AF episodes recorded - < 1 min - >100 hours in duration.  AF burden = 52.4%.  Patient is taking Eliquis. Presenting EGM = AP-VS @ 70 bpm. AP = 45.4%.  = 49.8%. Estimated battery longevity to ZENAIDA = 7 years. No short v-v intervals recorded. RV lead impedance trends appear stable. Patient notified of interrogation results. Patient reports that she is SOB today which is not a new finding for her.  Patient reports that she is scheduled for mitraclip procedure on Monday. Plan for patient to send a remote transmission in 3 months as scheduled.Remote pacemaker transmissionI have reviewed and interpreted the device interrogation, settings, programming and nurse's summary.  The device is functioning within normal device parameters.   I agree with the current findings, assessment and plan.      PFTs Dec 2018  The FEV1, FVC and FEV1/FVC ratio are within normal limits.  The diffusing capacity is normal.  However, the diffusing capacity was not corrected for the patient's hemoglobin.  IMPRESSION:  Low normal spirometry and diffusion        ATTENDING NOTE:  Patient has been seen and evaluated by me on 02/08/2019. I have reviewed the H&P.  Please refer to it for additional details.  I have reviewed today's vital signs, medications, labs, and imaging results.  I have reviewed and edited, as necessary, the history, review of systems, physical examination, and  assessment and plan.  I have discussed my assessment and plan with the cardiology fellow.  Linda Spicer is a 87 year old female with risk factor profile is: (+) HTN, (-) diabetes, (-) hyperlipidemia, (-) tobacco use, (+) family Hx CAD [mother, sister]. The patient was diagnosed with HFpEF in Oct 2014.  She had a dobutamine ECHO (Oct 2014) that was normal.  She had a RHC in Oct 2014 that showed normal hemodynamics at baseline although the CO was mildly depressed.  With fluid challenge, the left sided filling pressures dramatically increased. The results of her prior dobutamine ECHO and RHC are noted below; she has not undergone coronary angiography.      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 She had PFTs in Aug 2015 that showed moderate lung diffusion defect.  She did not desaturate after walking 6 minutes.  Inhalers have not provided any benefit.     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  She has been recuperating gradually.      She presented in August 2016 with chest pain, dizziness, and lightheadedness.  She was in atrial flutter at that time but converted back to NSR while in the ER.  She was started on metoprolol XL 12.5 mg daily, losartan was decreased to 50 mg daily, and spironolactone 25 mg qd was continued.  Her ECG did not show ischemic changes and her troponins were negative.  She was treated on heparin but anticoagulation was deferred and she was given ASA 81 mg qd.  She was switched from ASA to Eliquis in Nov 2016.     In Feb 2017 she was started to have recurrent atrial flutter.  She was treated with higher dose of beta blocker but developed bradycardia and near syncope and her beta blocker was stopped.  She returned to [facility] with tachycardia and a pacemaker was placed and she was restarted on Toprol XL 12.5 mg qd.  Her losartan was decreased to 25  mg qd and her spironolactone was continued at 25 mg qd.  She was started on Propofenone at that time.     She was seen at [facility] ER in July 2017 with chest pain.  It had been occurring in the setting of resuming an exercise program and the left sided chest pain was attributed to musculoskeletal pain. ECG showed atrial pacing.    .  Troponin < 0.045. CXR negative.     She returned to the ER in Aug 2017 after mixing up her medications.  Her Toprol XL had been increased to 25 mg qd but she had accidentally taken Losartan 50 mg qd instead of 25 mg qd.  She noticed HR had increased to 100-110 and she had lightheadedness.  She was returned to Toprol XL 12.5 mg qd and Losartan 25 mg qHS, and Spironolactone 25 mg qd.     She had recurrent ER visits in Nov 2017, Jan 2018, and most recently Feb 2018.  She felt palpitations, lightheadedness, and shortness of breath.  She believes she was in atrial flutter on each occasion. Her pacemaker was interrogated on 2/21/18.  Data shows 5 AT/AF episodes indicating atrial arrhythmias. This equals 7% of the time since 1/15/18. Longest episodes listed is 3 days with atrial rates up to >400bpm. Current episode appears to have started today, 2/21/18, at 0517. Vrates during AT/AF are <110bpm about 95%. Patient has a hx of PAF.  Data shows 3 non-sustained VT episodes, 1-3sec, Vrates 162-175bpm, all episodes were 1/24/18@1044 and were actually the same episode of NSVT lasting ~6 sec. Atrial pacing at 72.1%.  Ventricular pacing at 5.1%.  Presenting rhythm is AS- (AF).     She was discharged from the ER on Apixaban 2.5 BID and Toprol XL 25 mg qd.  Her home BP have been 116-192/58-93 mm Hg and HR 60 - 66.  Her Losartan, previously 25 mg qAM, was switch to 50 mg qHS.       She was admitted to [facility] in June 2018 with fractured coccyx following a fall and was in atrial fibrillation at that time by her report. She was subseqeuntly started on Rhythmol and was cardioverted and continued on Rhythmol  (July 2018).     She was seen by Dr. Comer in Feb 2018.  The six-minute walk distance (600 ft) is reduced. Her O2 saturation was 96%.  Mild restriction defect.  Mild diffusion defect. The diffusing capacity was not corrected for the patient's hemoglobin.  She remains compliant with her medications.  Of note, she had ABP with pH 7.50 and pCO2 30 confirming hyperventilation in 2014.     She had a fall in July 2018 after experiencing a fall with fractured S3.  She has been using a walker to ambulate and a wheelchair when necessary.       She presented to [facility] on Sept 13, 2018 with exhaustion.  She had SOB but this was unchanged from baseline.  She suspected she was back in AF so she contacted the EP nurses. Pacemaker interrogation showed presenting rhythm AF/ @ 60 bpm. Normal device function. AP= 0% and = 81%. No short V-V intervals recorded.  She underwent elective cardioversion by EP and was discharged to home that day.    I last saw her in clinic in Sept 2018.  I referred her for Makayla Clip procedure.  She was seen in cardiology clinic yesterday as part of the preoperative workup.   She described RUSSELL.  She had CXR with opacities at bases.  She was transferred to [facility] for further management and presumed CHF exacerbation.  On exam, she did not appear to be in over heart failure.  Baseline NT-BNP 3000s, now 2100.  Patient on Coreg, Hydalazine, and Imdur for CHF and HTN and she notes her BPs have been running high recently.  Her ECG shows AF with 100% ventricular pacing.  Recent interrogation showed AF with 50% pacing.   She is appropriately paced.   Plan on ECHO today and possible RHC to better assess volume.   She had YOLANDA (2/1/19) showing global and regional left ventricular function is normal with an EF of 55-60%, global longitudinal strain is -21.8% (normal is less than -18.0%), mitral valve with degenerative changes and there is moderate to severe regurgitation, regurgitation primarily from mid and medial  scallops. ERO 0.32 cm2 and RVol 59 mL (PISA).  Systolic flow reversal noted in 1 out of 4 pulmonary veins (LLPV). Compared to TTE study from 1/16/19, mitral regurgitation is worse.   Potential discharge today with follow up for Makayla Clip.      Emeterio Loza MD     Cardiovascular Division

## 2019-02-07 NOTE — PROGRESS NOTES
Linda was at her PAC clinic visit.  The attending, Dr. Tyler, found patient to be symptomatic with SOB.  After consulting with Dr. Watson and examined by the cardiology fellow, the decision was made to admit Linda prior to her MitraClip procedure, scheduled on Monday.  Pt placement was called and a bed was secured on 6C.  Report was called and given to the charge nurse on 6C.  Pt was still in the PAC clinic.  Dr. Tyler and son, Arjun, were informed that a bed was available.  Per Dr. Tyler, she states that Arjun will be driving the patient to the hospital.

## 2019-02-08 ENCOUNTER — APPOINTMENT (OUTPATIENT)
Dept: CARDIOLOGY | Facility: CLINIC | Age: 84
End: 2019-02-08
Attending: INTERNAL MEDICINE
Payer: MEDICARE

## 2019-02-08 VITALS
DIASTOLIC BLOOD PRESSURE: 68 MMHG | BODY MASS INDEX: 27.55 KG/M2 | SYSTOLIC BLOOD PRESSURE: 144 MMHG | TEMPERATURE: 98 F | RESPIRATION RATE: 18 BRPM | WEIGHT: 158 LBS | OXYGEN SATURATION: 97 %

## 2019-02-08 DIAGNOSIS — I34.0 SEVERE MITRAL REGURGITATION: Primary | ICD-10-CM

## 2019-02-08 PROBLEM — R06.09 DOE (DYSPNEA ON EXERTION): Status: ACTIVE | Noted: 2019-02-08

## 2019-02-08 LAB
ABO + RH BLD: NORMAL
ABO + RH BLD: NORMAL
ANION GAP SERPL CALCULATED.3IONS-SCNC: 8 MMOL/L (ref 3–14)
BLD GP AB SCN SERPL QL: NORMAL
BLOOD BANK CMNT PATIENT-IMP: NORMAL
BLOOD BANK CMNT PATIENT-IMP: NORMAL
BUN SERPL-MCNC: 41 MG/DL (ref 7–30)
CALCIUM SERPL-MCNC: 8.5 MG/DL (ref 8.5–10.1)
CHLORIDE SERPL-SCNC: 106 MMOL/L (ref 94–109)
CO2 SERPL-SCNC: 25 MMOL/L (ref 20–32)
CREAT SERPL-MCNC: 1.53 MG/DL (ref 0.52–1.04)
GFR SERPL CREATININE-BSD FRML MDRD: 30 ML/MIN/{1.73_M2}
GLUCOSE SERPL-MCNC: 92 MG/DL (ref 70–99)
INTERPRETATION ECG - MUSE: NORMAL
POTASSIUM SERPL-SCNC: 4.2 MMOL/L (ref 3.4–5.3)
SODIUM SERPL-SCNC: 139 MMOL/L (ref 133–144)
SPECIMEN EXP DATE BLD: NORMAL

## 2019-02-08 PROCEDURE — 40000893 ZZH STATISTIC PT IP EVAL DEFER

## 2019-02-08 PROCEDURE — G0378 HOSPITAL OBSERVATION PER HR: HCPCS

## 2019-02-08 PROCEDURE — 93306 TTE W/DOPPLER COMPLETE: CPT

## 2019-02-08 PROCEDURE — 86901 BLOOD TYPING SEROLOGIC RH(D): CPT | Performed by: NURSE PRACTITIONER

## 2019-02-08 PROCEDURE — 86850 RBC ANTIBODY SCREEN: CPT | Performed by: NURSE PRACTITIONER

## 2019-02-08 PROCEDURE — 36415 COLL VENOUS BLD VENIPUNCTURE: CPT | Performed by: INTERNAL MEDICINE

## 2019-02-08 PROCEDURE — 36415 COLL VENOUS BLD VENIPUNCTURE: CPT | Performed by: NURSE PRACTITIONER

## 2019-02-08 PROCEDURE — 25000132 ZZH RX MED GY IP 250 OP 250 PS 637: Mod: GY | Performed by: INTERNAL MEDICINE

## 2019-02-08 PROCEDURE — 93306 TTE W/DOPPLER COMPLETE: CPT | Mod: 26 | Performed by: INTERNAL MEDICINE

## 2019-02-08 PROCEDURE — A9270 NON-COVERED ITEM OR SERVICE: HCPCS | Mod: GY | Performed by: INTERNAL MEDICINE

## 2019-02-08 PROCEDURE — 99218 ZZC INITIAL OBSERVATION CARE,LEVL I: CPT | Mod: 25 | Performed by: INTERNAL MEDICINE

## 2019-02-08 PROCEDURE — 80048 BASIC METABOLIC PNL TOTAL CA: CPT | Performed by: INTERNAL MEDICINE

## 2019-02-08 PROCEDURE — 86900 BLOOD TYPING SEROLOGIC ABO: CPT | Performed by: NURSE PRACTITIONER

## 2019-02-08 RX ADMIN — FOLIC ACID 1 MG: 1 TABLET ORAL at 08:06

## 2019-02-08 RX ADMIN — HYDRALAZINE HYDROCHLORIDE 25 MG: 25 TABLET ORAL at 15:45

## 2019-02-08 RX ADMIN — FUROSEMIDE 20 MG: 20 TABLET ORAL at 08:05

## 2019-02-08 RX ADMIN — HYDRALAZINE HYDROCHLORIDE 25 MG: 25 TABLET ORAL at 12:42

## 2019-02-08 RX ADMIN — OXYCODONE HYDROCHLORIDE AND ACETAMINOPHEN 500 MG: 500 TABLET ORAL at 08:05

## 2019-02-08 RX ADMIN — DOCUSATE SODIUM AND SENNOSIDES 1 TABLET: 50; 8.6 TABLET ORAL at 08:06

## 2019-02-08 RX ADMIN — HYDRALAZINE HYDROCHLORIDE 25 MG: 25 TABLET ORAL at 08:05

## 2019-02-08 RX ADMIN — Medication 1 TABLET: at 08:06

## 2019-02-08 RX ADMIN — FUROSEMIDE 20 MG: 20 TABLET ORAL at 15:45

## 2019-02-08 RX ADMIN — LEVOTHYROXINE SODIUM 75 MCG: 75 TABLET ORAL at 08:05

## 2019-02-08 RX ADMIN — CARVEDILOL 3.12 MG: 3.12 TABLET, FILM COATED ORAL at 08:05

## 2019-02-08 RX ADMIN — ISOSORBIDE MONONITRATE 60 MG: 60 TABLET, EXTENDED RELEASE ORAL at 08:05

## 2019-02-08 RX ADMIN — THERA TABS 1 TABLET: TAB at 08:06

## 2019-02-08 RX ADMIN — Medication 1 G: at 08:05

## 2019-02-08 ASSESSMENT — ACTIVITIES OF DAILY LIVING (ADL)
ADLS_ACUITY_SCORE: 21

## 2019-02-08 NOTE — DISCHARGE SUMMARY
[address] [address]  p: [phone]    Discharge Summary: Cardiology Service    Linda Spicer MRN# [id number]   YOB: 1931 Age: 87 year old       Admission Date: 2/7/2019  Discharge Date: 02/08/19      Discharge Diagnoses:  1. Moderate to severe mitral regurgitation  2. Chronic diastolic heart failure  3. Mild- moderate primary pulmonary hypertension  4. Atrial fibrillation  5. Tachy-lisa syndrome s/p PPM  6. 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 CKD   8. Hypertension    Pertinent Procedures:  None    Consults:  None    Imaging with results:  Echocardiogram 2/8/19:  Interpretation Summary  Normal left and right ventricular size and function.  Mitral insufficiency appears mild in severity.  Pulmonary artery systolic pressure is normal.  The inferior vena cava was normal in size with preserved respiratory  variability.  Estimated mean right atrial pressure is 3 mmHg (normal).     Compared to the last transthoracic study on 1/16/19 the MR looks essentially  unchanged.    Coronary Angiogram/RHC:  HEMODYNAMICS 1/31:  BSA 1.83  1. HR 67 bpm  2. /83/118 mmHg  3. RA 7/8/6   4. RV 46/5  5. PA 46/18/32   6. PCW 16/16/13   7. PA sat 61%   8. PCW sat --%  9. Hgb 12 g/dL   10. Adrienne CO 2.5   11. Adrienne CI 1.4   12. TD CO 2.7   13. TD CI 1.4   14. PVR 7.8         LEFT HEART CATHETERIZATION:  1. LVEDP 15 mmHg.  2. No gradient across the aortic valve on pull back.  3. 1.0 liter of normal saline was given prior to performing simultaneous LV/RV pressures to evaluate for restrictive/constrictive physiology given markedly reduced cardiac index of 1.4 by Adrienne and TD. There was equalization of LV and RV end diastolic pressure at 15 mmHg after the fluid challenge with concordant respiratory variability suggestive of restrictive physiology.    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 She was planned mireille-clip for Monday.    Hospital Course by Diagnosis:  # Gradually worsening dyspnea- does not appear in CHF, some BP values were elevated at home so high afterload could be making MR worse. Other possibilities include pulmonary hypertension and high LV filling pressures with activity due to diastolic dysfunction/restrictive physioolgy. afib not likely since afib burden was no significantly elevated compared to prior device check.      # Moderate-severe mitral regurgitation  # HFpEF (EF 55-60%) with restrictive physiology on recent cath  # Mild-moderate primary pulmonary hypertension likely from PF rom RA  Admitted with worsening RUSSELL, felt to be largely anxiety driven, although patient does have known mod-severe MR.  Repeat echocardiogram unchanged with normal IVC.      - Patient will plan to return for MitraClip procedure Monday as previously arranged  - Patient also aware she needs to start holding her Apixaban 2/9, as well as take 325 mg ASA Sunday and Monday.   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 +BS.    Extremities: no edema, no cyanosis or clubbing, dorsalis pedis and posterior tibialis pulses 2+ bilaterally  Skin: Warm and dry, no jaundice or rash  Neuro: CN 2-12 intact, moves all extremities equally  Psych: Alert & oriented x 3      Medication Changes:  - Hold Apixaban 2/9 for surgery  - Take 325 mg ASA Sunday and Monday, 2/10, 2/11     Discharge medications:   Current Discharge Medication List      CONTINUE these medications which have CHANGED    Details   apixaban ANTICOAGULANT (ELIQUIS) 2.5 MG tablet Take 1 tablet (2.5 mg) by mouth 2 times daily Stop taking 2/9 (last dose evening of Feb 8, 2019) in preparation for MitraClip Procedure on Monday  Qty: 60 tablet, Refills: 0    Associated Diagnoses: Atrial flutter, paroxysmal (H)         CONTINUE these medications which have NOT CHANGED    Details   abatacept (ORENCIA) 250 MG injection Inject 750 mg into the vein every 28 days  Qty: 30 mL, Refills: 0    Associated Diagnoses: Rheumatoid arthritis involving multiple sites with positive rheumatoid factor (H)      acetaminophen (TYLENOL) 500 MG tablet Take 1 tablet (500 mg) by mouth every 6 hours as needed for mild pain or fever    Associated Diagnoses: Rheumatoid arthritis involving multiple sites with positive rheumatoid factor (H)      albuterol (PROVENTIL) (2.5 MG/3ML) 0.083% neb solution Take 1 vial (2.5 mg) by nebulization every 6 hours as needed for shortness of breath / dyspnea or wheezing  Qty: 360 mL    Associated Diagnoses: ILD (interstitial lung disease) (H)      azaTHIOprine (IMURAN) 50 MG tablet Take 1 tablet (50 mg) by mouth daily  Qty: 30 tablet, Refills: 0    Associated Diagnoses:  Rheumatoid arthritis involving multiple sites with positive rheumatoid factor (H)      calcium carbonate-vitamin D (OS-FATOUMATA) 500-400 MG-UNIT tablet Take 1 tablet by mouth daily  Qty: 30 tablet, Refills: 0    Associated Diagnoses: Rheumatoid arthritis involving multiple sites with positive rheumatoid factor (H)      Carboxymethylcellulose Sod PF (CELLUVISC/REFRESH LIQUIGEL) 1 % ophthalmic gel Place 1 drop into both eyes daily as needed for dry eyes  Qty: 1 Bottle    Associated Diagnoses: Dry eyes      carvedilol (COREG) 3.125 MG tablet Take 1 tablet (3.125 mg) by mouth 2 times daily (with meals)  Qty: 60 tablet, Refills: 0    Associated Diagnoses: Heart failure with preserved ejection fraction, NYHA class I (H); Benign essential hypertension      cetirizine (ZYRTEC ALLERGY) 10 MG tablet Take 1 tablet (10 mg) by mouth At Bedtime  Qty: 30 tablet, Refills: 0    Associated Diagnoses: Seasonal allergic rhinitis due to other allergic trigger      denosumab (PROLIA) 60 MG/ML SOLN injection Inject 1 mL (60 mg) Subcutaneous every 6 months  Qty: 1 mL    Comments: Reportedly last given on 11/26/18. Due for next dose on 5/26/18. Please discuss with endocrinology or primary care prior to administering this dose.  Associated Diagnoses: Age-related osteoporosis without current pathological fracture      diphenhydrAMINE (BENADRYL) 25 MG tablet Take 25 mg by mouth as needed Patient takes 25-50mg 1-2 times a day.      fish oil-omega-3 fatty acids 1000 MG capsule Take 1 capsule (1 g) by mouth 2 times daily    Associated Diagnoses: Hyperlipidemia LDL goal <100      folic acid (FOLVITE) 1 MG tablet Take 1 tablet (1 mg) by mouth daily  Qty: 30 tablet, Refills: 0    Associated Diagnoses: Oral aphthae      furosemide (LASIX) 20 MG tablet Take 1 tablet (20 mg) by mouth 2 times daily Hold Lasix 12/18 and 12/19, then start 20 mg daily after that.  Qty: 60 tablet, Refills: 0    Associated Diagnoses: Heart failure with preserved ejection fraction  (H)      hydrALAZINE (APRESOLINE) 10 MG tablet Take 10 mg by mouth 3 times daily      isosorbide mononitrate (IMDUR) 60 MG 24 hr tablet Take 1 tablet (60 mg) by mouth daily  Qty: 30 tablet, Refills: 0    Associated Diagnoses: Hypertension goal BP (blood pressure) < 150/90      levothyroxine (SYNTHROID/LEVOTHROID) 75 MCG tablet Take 1 tablet (75 mcg) by mouth daily  Qty: 30 tablet, Refills: 0    Associated Diagnoses: Hypothyroidism, unspecified type      Multiple Vitamins-Minerals (PRESERVISION AREDS) CAPS Take 1 capsule by mouth 2 times daily  Qty: 30 capsule    Associated Diagnoses: (HFpEF) heart failure with preserved ejection fraction (H)      nitroGLYcerin (NITROSTAT) 0.4 MG sublingual tablet Place 1 tablet (0.4 mg) under the tongue every 5 minutes as needed for chest pain  Qty: 25 tablet, Refills: 11    Associated Diagnoses: Acute chest pain      !! order for DME Dispense SP Walker-small  Qty: 1 each, Refills: 0    Associated Diagnoses: Pain of toe of right foot; Status post bunionectomy      !! order for DME Equipment being ordered: Dispense baffle, for use with nebulizer.  Qty: 1 each, Refills: 0    Associated Diagnoses: Pneumonia of left upper lobe due to Mycoplasma pneumoniae      !! order for DME Equipment being ordered: Nebulizer. Use with Albuterol.  Qty: 1 each, Refills: 0    Associated Diagnoses: Hypoxia      !! order for DME Equipment being ordered: Dispense face mask.  Mrs. Spicer is immunosuppressed due to rheumatoid arthritis.  Qty: 1 Box, Refills: 11    Associated Diagnoses: Rheumatoid arthritis involving left wrist with positive rheumatoid factor (H)      ranibizumab (LUCENTIS) 0.3 MG/0.05ML SOLN 0.05 mLs (0.3 mg) by Intravitreal route See Admin Instructions Every 6 weeks    Associated Diagnoses: Macular degeneration of left eye, unspecified type      ranitidine (ZANTAC) 150 MG tablet Take 1 tablet (150 mg) by mouth 2 times daily  Qty: 60 tablet, Refills: 0    Associated Diagnoses:  Gastroesophageal reflux disease without esophagitis      saccharomyces boulardii (FLORASTOR) 250 MG capsule Take 1 capsule (250 mg) by mouth daily  Qty: 30 capsule, Refills: 0    Associated Diagnoses: (HFpEF) heart failure with preserved ejection fraction (H)      senna-docusate (SENOKOT-S/PERICOLACE) 8.6-50 MG tablet Take 1 tablet by mouth daily as needed for constipation  Qty: 100 tablet    Associated Diagnoses: Irritable bowel syndrome, unspecified type      trimethoprim (TRIMPEX) 100 MG tablet Take 0.5 tablets (50 mg) by mouth daily  Qty: 15 tablet, Refills: 0    Comments: Hold while taking Omnicef for UTI for 7 days, then resume the day after  Associated Diagnoses: Recurrent UTI      vitamin C (ASCORBIC ACID) 500 MG tablet Take 1 tablet (500 mg) by mouth daily  Qty: 30 tablet, Refills: 0    Associated Diagnoses: Vitamin deficiency       !! - Potential duplicate medications found. Please discuss with provider.          Labs or imaging requiring follow-up after discharge:  None      Follow-up:  - Follow up with [facility] Monday for MitraClip as previously arranged    Code status:  DNR, short term intubation for respiratory distress okay; verified with patient and son    Jessica Gusmaninger, APRN, CNP  [facility] Card[facility]  [phone]          ATTENDING NOTE:  Patient has been seen and evaluated by me on 02/08/2019. I participated in the discharge of the patient.  I have reviewed today's vital signs, medications, labs, and imaging results.  I have reviewed and edited, as necessary, the history, review of systems, physical examination, and assessment and plan.  I have discussed my assessment and plan with the cardiology fellow.  Linda Spicer is a 87 year old female with risk factor profile is: (+) HTN, (-) diabetes, (-) hyperlipidemia, (-) tobacco use, (+) family Hx CAD [mother, sister]. The patient was diagnosed with HFpEF in Oct 2014.  She had a dobutamine ECHO (Oct 2014) that was normal.  She had a RHC in Oct  2014 that showed normal hemodynamics at baseline although the CO was mildly depressed.  With fluid challenge, the left sided filling pressures dramatically increased. The results of her prior dobutamine ECHO and RHC are noted below; she has not undergone coronary angiography.       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 She had PFTs in Aug 2015 that showed moderate lung diffusion defect.  She did not desaturate after walking 6 minutes.  Inhalers have not provided any benefit.      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  She has been recuperating gradually.       She presented in August 2016 with chest pain, dizziness, and lightheadedness.  She was in atrial flutter at that time but converted back to NSR while in the ER.  She was started on metoprolol XL 12.5 mg daily, losartan was decreased to 50 mg daily, and spironolactone 25 mg qd was continued.  Her ECG did not show ischemic changes and her troponins were negative.  She was treated on heparin but anticoagulation was deferred and she was given ASA 81 mg qd.  She was switched from ASA to Eliquis in Nov 2016.     In Feb 2017 she was started to have recurrent atrial flutter.  She was treated with higher dose of beta blocker but developed bradycardia and near syncope and her beta blocker was stopped.  She returned to [facility] with tachycardia and a pacemaker was placed and she was restarted on Toprol XL 12.5 mg qd.  Her losartan was decreased to 25 mg qd and her spironolactone was continued at 25 mg qd.  She was started on Propofenone at that time.     She was seen at [facility] ER in July 2017 with chest pain.  It had been occurring in the setting of resuming an exercise program and the left sided chest pain was attributed to musculoskeletal pain. ECG showed atrial pacing.    .  Troponin < 0.045. CXR negative.     She  returned to the ER in Aug 2017 after mixing up her medications.  Her Toprol XL had been increased to 25 mg qd but she had accidentally taken Losartan 50 mg qd instead of 25 mg qd.  She noticed HR had increased to 100-110 and she had lightheadedness.  She was returned to Toprol XL 12.5 mg qd and Losartan 25 mg qHS, and Spironolactone 25 mg qd.     She had recurrent ER visits in Nov 2017, Jan 2018, and most recently Feb 2018.  She felt palpitations, lightheadedness, and shortness of breath.  She believes she was in atrial flutter on each occasion. Her pacemaker was interrogated on 2/21/18.  Data shows 5 AT/AF episodes indicating atrial arrhythmias. This equals 7% of the time since 1/15/18. Longest episodes listed is 3 days with atrial rates up to >400bpm. Current episode appears to have started today, 2/21/18, at 0517. Vrates during AT/AF are <110bpm about 95%. Patient has a hx of PAF.  Data shows 3 non-sustained VT episodes, 1-3sec, Vrates 162-175bpm, all episodes were 1/24/18@1044 and were actually the same episode of NSVT lasting ~6 sec. Atrial pacing at 72.1%.  Ventricular pacing at 5.1%.  Presenting rhythm is AS- (AF).     She was discharged from the ER on Apixaban 2.5 BID and Toprol XL 25 mg qd.  Her home BP have been 116-192/58-93 mm Hg and HR 60 - 66.  Her Losartan, previously 25 mg qAM, was switch to 50 mg qHS.       She was admitted to [facility] in June 2018 with fractured coccyx following a fall and was in atrial fibrillation at that time by her report. She was subseqeuntly started on Rhythmol and was cardioverted and continued on Rhythmol (July 2018).     She was seen by Dr. Comer in Feb 2018.  The six-minute walk distance (600 ft) is reduced. Her O2 saturation was 96%.  Mild restriction defect.  Mild diffusion defect. The diffusing capacity was not corrected for the patient's hemoglobin.  She remains compliant with her medications.  Of note, she had ABP with pH 7.50 and pCO2 30 confirming hyperventilation  in 2014.     She had a fall in July 2018 after experiencing a fall with fractured S3.  She has been using a walker to ambulate and a wheelchair when necessary.       She presented to [facility] on Sept 13, 2018 with exhaustion.  She had SOB but this was unchanged from baseline.  She suspected she was back in AF so she contacted the EP nurses. Pacemaker interrogation showed presenting rhythm AF/ @ 60 bpm. Normal device function. AP= 0% and = 81%. No short V-V intervals recorded.  She underwent elective cardioversion by EP and was discharged to home that day.     I last saw her in clinic in Sept 2018.  I referred her for Makayla Clip procedure.  She was seen in cardiology clinic yesterday as part of the preoperative workup.   She described RUSSELL.  She had CXR with opacities at bases.  She was transferred to [facility] for further management and presumed CHF exacerbation.  On exam, she did not appear to be in over heart failure.  Baseline NT-BNP 3000s, now 2100.  Patient on Coreg, Hydalazine, and Imdur for CHF and HTN and she notes her BPs have been running high recently.  Her ECG shows AF with 100% ventricular pacing.  Recent interrogation showed AF with 50% pacing.   She is appropriately paced.   Plan on ECHO today and possible RHC to better assess volume.   She had YOLANDA (2/1/19) showing global and regional left ventricular function is normal with an EF of 55-60%, global longitudinal strain is -21.8% (normal is less than -18.0%), mitral valve with degenerative changes and there is moderate to severe regurgitation, regurgitation primarily from mid and medial scallops. ERO 0.32 cm2 and RVol 59 mL (PISA).  Systolic flow reversal noted in 1 out of 4 pulmonary veins (LLPV). Compared to TTE study from 1/16/19, mitral regurgitation is worse.   Potential discharge today with follow up for Makayla Clip.        Emeterio Loza MD     Cardiovascular Division

## 2019-02-08 NOTE — PLAN OF CARE
PT consult received and appreciated. Per discussion with patient and RN, pt is up independently. Mainly limited by shortness of breath, which is not a change from baseline. Patient eager for answers regarding medical plan and discharge. PT will complete orders as pt does not present with acute PT needs at this time.

## 2019-02-08 NOTE — PROGRESS NOTES
Date: 2/8/2019    Time of Call: 9:18 AM     Diagnosis:  Severe mitral regurgitation     [ TORB ] Ordering provider: Avila Watson MD  Order: scheduling orders      Order received by: shakeel flanagan RN     Follow-up/additional notes: new scheduling order needed per Lavern Sparrow RN

## 2019-02-08 NOTE — SIGNIFICANT EVENT
SPIRITUAL HEALTH SERVICES  SPIRITUAL ASSESSMENT Progress Note  [facility] 6C     REFERRAL SOURCE: Epic     Pt was resting in bed during visit. She said is having a procedure on Monday and hopes to return home Tuesday or Wednesday. We discussed her family relationships which she said ae strong and supportive.    I ANOINTED PT. ON 2/8/2019 and gave her Communion.      PLAN: Unit  to assess possibly further visits next week.    Christiano Armijo M.Div.

## 2019-02-08 NOTE — PLAN OF CARE
D/report taken by another, and settled by other. Admitted for CHF and mitral regurg, she is to have mitral clip on Monday. History of CKD, ILD, is paced and has A fib. Up with walker and assist of 1.   P/admit

## 2019-02-08 NOTE — PLAN OF CARE
"2300-0730: VSS on RA. Admitted for increased SOB. Planned mitral clip on Monday 2/11/19. Suspected CHF exacerbation or MR.  Neuro: A&Ox4. No deficits.  Cardiac: Paced, underlying A.Fib/flutter. Rate controlled 70s. BP 110s/60s. Denies chest pain.  Resp: RA. RUSSELL with walking to bathroom. Maintains sats.  GI/: Voiding small amounts. Refused 1 time dose of IV Lasix. No BM overnight.   Diet/Appetite: Low fat diet, fair appetite.  Skin: no deficits.  Access: No PIV, discussed with patient safety behind having PIV for all patients. Patient refused, \"until I really need one\".  Drains: none  Activity: Up ad noelle  Pain: denies    Will continue with plan of care and notify MD of any changes.     "

## 2019-02-08 NOTE — PROVIDER NOTIFICATION
"MD MCINTYRE paged regarding patient refusing 1x dose of IV Lasix 20mg ordered. Patient has voided once since admission-100cc. Clarified with patient that this was true. Educated patient on importance of administering Lasix, stated, \"I feel so dry right now, I just can't\".  Will continue to monitor and follow POC.  "

## 2019-02-08 NOTE — UTILIZATION REVIEW
"  Admission Status; Secondary Review Determination         Under the authority of the Utilization Management Committee, the utilization review process indicated a secondary review on the above patient.  The review outcome is based on review of the medical records, discussions with staff, and applying clinical experience noted on the date of the review.        ()      Inpatient Status Appropriate - This patient's medical care is consistent with medical management for inpatient care and reasonable inpatient medical practice.      (X) Observation Status Appropriate - This patient does not meet hospital inpatient criteria and is placed in observation status. If this patient's primary payer is Medicare and was admitted as an inpatient, Condition Code 44 should be used and patient status changed to \"observation\".   () Admission Status NOT Appropriate - This patient's medical care is not consistent with medical management for Inpatient or Observation Status.          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  BNP is elevated.  Chest xray shows no acute abnormalities.  Creatinine is at baseline.  Vital signs are normal with some past hypertensive readings.  She will have a medication adjustment and right heart catheterization.  I would recommend placement in Observation status.  I have spoken to Jessica Tapia.          The severity of illness, intensity of service provided, expected LOS and risk for adverse outcome make the care complex, high risk and appropriate for hospital admission.        The information on this document is developed by the " utilization review team in order for the business office to ensure compliance.  This only denotes the appropriateness of proper admission status and does not reflect the quality of care rendered.         The definitions of Inpatient Status and Observation Status used in making the determination above are those provided in the CMS Coverage Manual, Chapter 1 and Chapter 6, section 70.4.      Sincerely,     Joey Wellington MD  Physician Advisor  Utilization Review/ Case Management  [facility].

## 2019-02-08 NOTE — PROGRESS NOTES
[facility]  CARDIOLOGY DAILY PROGRESS NOTE    Interval History:   No significant events overnight    Objective  Temp:  [97.6  F (36.4  C)-98.4  F (36.9  C)] 97.6  F (36.4  C)  Pulse:  [64] 64  Heart Rate:  [69-74] 69  Resp:  [16-18] 16  BP: (110-156)/(63-72) 134/69  SpO2:  [94 %-98 %] 98 %    Intake/Output Summary (Last 24 hours) at 2/8/2019 1008  Last data filed at 2/8/2019 0500  Gross per 24 hour   Intake 380 ml   Output 450 ml   Net -70 ml     Wt Readings from Last 5 Encounters:   02/08/19 71.7 kg (158 lb)   02/07/19 73.5 kg (162 lb)   01/29/19 73.9 kg (162 lb 14.4 oz)   01/19/19 73.5 kg (162 lb)   01/15/19 73.9 kg (163 lb)     Gen: in no acute distress   CV: RRR, S1 & S2, systolic murmur at apex, JVP 6cm (not elevated)  Lungs: fine crackles in lower bases bilaterally otherwise clear  Abd: soft, nontender, BS present   Ext: mild leg edema bilaterally      No current facility-administered medications on file prior to encounter.   Current Outpatient Medications on File Prior to Encounter:  abatacept (ORENCIA) 250 MG injection Inject 750 mg into the vein every 28 days   acetaminophen (TYLENOL) 500 MG tablet Take 1 tablet (500 mg) by mouth every 6 hours as needed for mild pain or fever (Patient not taking: Reported on 2/7/2019)   albuterol (PROVENTIL) (2.5 MG/3ML) 0.083% neb solution Take 1 vial (2.5 mg) by nebulization every 6 hours as needed for shortness of breath / dyspnea or wheezing (Patient not taking: Reported on 2/7/2019)   apixaban ANTICOAGULANT (ELIQUIS) 2.5 MG tablet Take 1 tablet (2.5 mg) by mouth 2 times daily   azaTHIOprine (IMURAN) 50 MG tablet Take 1 tablet (50 mg) by mouth daily (Patient taking differently: Take 50 mg by mouth every evening )   calcium carbonate-vitamin D (OS-FATOUMATA) 500-400 MG-UNIT tablet Take 1 tablet by mouth daily   Carboxymethylcellulose Sod PF (CELLUVISC/REFRESH LIQUIGEL) 1 % ophthalmic gel Place 1 drop into both eyes daily as needed for dry eyes    carvedilol (COREG) 3.125 MG tablet Take 1 tablet (3.125 mg) by mouth 2 times daily (with meals)   cetirizine (ZYRTEC ALLERGY) 10 MG tablet Take 1 tablet (10 mg) by mouth At Bedtime   denosumab (PROLIA) 60 MG/ML SOLN injection Inject 1 mL (60 mg) Subcutaneous every 6 months   diphenhydrAMINE (BENADRYL) 25 MG tablet Take 25 mg by mouth as needed Patient takes 25-50mg 1-2 times a day.   fish oil-omega-3 fatty acids 1000 MG capsule Take 1 capsule (1 g) by mouth 2 times daily   folic acid (FOLVITE) 1 MG tablet Take 1 tablet (1 mg) by mouth daily   furosemide (LASIX) 20 MG tablet Take 1 tablet (20 mg) by mouth 2 times daily Hold Lasix 12/18 and 12/19, then start 20 mg daily after that. (Patient taking differently: 40mg by mouth every morning and 20mg by mouth every afternoon)   hydrALAZINE (APRESOLINE) 10 MG tablet Take 10 mg by mouth 3 times daily   isosorbide mononitrate (IMDUR) 60 MG 24 hr tablet Take 1 tablet (60 mg) by mouth daily   levothyroxine (SYNTHROID/LEVOTHROID) 75 MCG tablet Take 1 tablet (75 mcg) by mouth daily   Multiple Vitamins-Minerals (PRESERVISION AREDS) CAPS Take 1 capsule by mouth 2 times daily   nitroGLYcerin (NITROSTAT) 0.4 MG sublingual tablet Place 1 tablet (0.4 mg) under the tongue every 5 minutes as needed for chest pain (Patient not taking: Reported on 2/7/2019)   order for DME Dispense TIESHA Curran   order for DME Equipment being ordered: Dispense baffle, for use with nebulizer.   order for DME Equipment being ordered: Nebulizer. Use with Albuterol.   order for DME Equipment being ordered: Dispense face mask.Mrs. Spicer is immunosuppressed due to rheumatoid arthritis.   ranibizumab (LUCENTIS) 0.3 MG/0.05ML SOLN 0.05 mLs (0.3 mg) by Intravitreal route See Admin Instructions Every 6 weeks   ranitidine (ZANTAC) 150 MG tablet Take 1 tablet (150 mg) by mouth 2 times daily (Patient taking differently: Take 150 mg by mouth At Bedtime )   saccharomyces boulardii (FLORASTOR) 250 MG capsule Take 1  capsule (250 mg) by mouth daily   senna-docusate (SENOKOT-S/PERICOLACE) 8.6-50 MG tablet Take 1 tablet by mouth daily as needed for constipation   trimethoprim (TRIMPEX) 100 MG tablet Take 0.5 tablets (50 mg) by mouth daily   vitamin C (ASCORBIC ACID) 500 MG tablet Take 1 tablet (500 mg) by mouth daily       apixaban ANTICOAGULANT  2.5 mg Oral BID     azaTHIOprine  50 mg Oral QPM     calcium carbonate 600 mg-vitamin D 400 units  1 tablet Oral Daily     carvedilol  3.125 mg Oral BID w/meals     fish oil-omega-3 fatty acids  1 g Oral Daily     folic acid  1 mg Oral Daily     furosemide  20 mg Intravenous Once     furosemide  20 mg Oral BID     hydrALAZINE  25 mg Oral 4x Daily     isosorbide mononitrate  60 mg Oral Daily     levothyroxine  75 mcg Oral QAM AC     multivitamin, therapeutic  1 tablet Oral Daily     senna-docusate  1 tablet Oral BID    Or     senna-docusate  2 tablet Oral BID     sodium chloride (PF)  3 mL Intracatheter Q8H     vitamin C  500 mg Oral Daily     Lab Values  Labs have been reviewed  BMP  Recent Labs   Lab 02/08/19  0548 02/07/19  2213    141   POTASSIUM 4.2 5.0   CHLORIDE 106 107   FATOUMATA 8.5 8.6   CO2 25 24   BUN 41* 40*   CR 1.53* 1.38*   GLC 92 122*     LFTs  Recent Labs   Lab 02/07/19  2213   ALKPHOS 51   AST 27   ALT 18   BILITOTAL 0.4   PROTTOTAL 6.9   ALBUMIN 3.1*      CBC  Recent Labs   Lab 02/07/19  2213   WBC 7.2   RBC 3.59*   HGB 11.8   HCT 35.5   MCV 99   MCH 32.9   MCHC 33.2   RDW 13.3        INRNo lab results found in last 7 days.  TpnInvalid input(s): TPN  EKG 1/31: V paced rhythm with underlying afib    YOLANDA 1/31  Interpretation Summary  Global and regional left ventricular function is normal with an EF of 55-60%.  Global longitudinal strain is -21.8% (normal is less than -18.0%)  Mitral valve with degenerative changes and there is moderate to severe  regurgitation.  Regurgitation primarily from mid and medial scallops. ERO 0.32 cm2 and RVol 59  mL (PISA). Systolic  flow reversal noted in 1 out of 4 pulmonary veins (LLPV).     Compared to TTE study from 1/16/19, mitral regurgitation is worse.     HEMODYNAMICS 1/31:  BSA 1.83  1. HR 67 bpm  2. /83/118 mmHg  3. RA 7/8/6   4. RV 46/5  5. PA 46/18/32   6. PCW 16/16/13   7. PA sat 61%   8. PCW sat --%  9. Hgb 12 g/dL   10. Adrienne CO 2.5   11. Adrienne CI 1.4   12. TD CO 2.7   13. TD CI 1.4   14. PVR 7.8         LEFT HEART CATHETERIZATION:  1. LVEDP 15 mmHg.  2. No gradient across the aortic valve on pull back.  3. 1.0 liter of normal saline was given prior to performing simultaneous LV/RV pressures to evaluate for restrictive/constrictive physiology given markedly reduced cardiac index of 1.4 by Adrienne and TD. There was equalization of LV and RV end diastolic pressure at 15 mmHg after the fluid challenge with concordant respiratory variability suggestive of restrictive physiology.     SUMMARY:   Normal left and right sided filling pressures.  Mild pulmonary hypertension, primary  Reduced cardiac output.  Restrictive physiology / cardiomyopathy.  Mild non obstructive coronary artery disease.           Pacemaker check 2/5  Remote pacemaker transmission received and reviewed. Device transmission sent per MD orders. Patient has a Medtronic dual lead pacemaker. Normal pacemaker function. 17 AT/AF episodes recorded - < 1 min - >100 hours in duration.  AF burden = 52.4%.  Patient is taking Eliquis. Presenting EGM = AP-VS @ 70 bpm. AP = 45.4%.  = 49.8%. Estimated battery longevity to ZENAIDA = 7 years. No short v-v intervals recorded. RV lead impedance trends appear stable. Patient notified of interrogation results. Patient reports that she is SOB today which is not a new finding for her.  Patient reports that she is scheduled for mitraclip procedure on Monday. Plan for patient to send a remote transmission in 3 months as scheduled.Remote pacemaker transmissionI have reviewed and interpreted the device interrogation, settings, programming and  nurse's summary.  The device is functioning within normal device parameters.   I agree with the current findings, assessment and plan.        PFTs Dec 2018  The FEV1, FVC and FEV1/FVC ratio are within normal limits.  The diffusing capacity is normal.  However, the diffusing capacity was not corrected for the patient's hemoglobin.  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 She was planned mireille-clip for Monday.     # Gradually worsening dyspnea- does not appear in CHF, some BP values were elevated at home so high afterload could be making MR worse. Other possibilities include pulmonary hypertension and high LV filling pressures with activity due to diastolic dysfunction/restrictive physioolgy. afib not likely since afib burden was no significantly elevated compared to prior device check.      # Moderate-severe mitral regurgitation     # HFpEF (EF 55-60%) with restrictive physiology on recent cath,      # Mild-moderate primary pulmonary hypertension likely from PF rom RA     # Atrial fibrillation on apixaban at home,     # Tachy-lisa syndrome s/p dual chamber pacemaker,     # Pulmonary fibrosis from rheumatoid arthritis,     # CKD baseline Cr 1.5-1.8     # HTN     Plan  -Echo  - may consider RHC if etiology not clear based on echo  - Optimize preload: resume home lasix 20mg PO BID. May give IV diuresis if testing above suggests volume overload  -Optmize afterload: Asked son to bring home BP values. May need to adjust these meds if BP has been high at home. continue home carvedilol 3.125 BID, Hydralazine 12.5 TID,  Imdur 60  - Apixaban 2.5mg BID for Afib   - continue home RA meds        FEN/GI/: diet  VTE prophylaxis: Holding AM apixaban for possible RHC  Code status: Spent long time talking with patient and son to clarify this. They are going to bring their legal document tomorrow. My understanding is that they are ok to intubate for respiratory arrest. No interventions for cardiac arrest.  Disposition: pending evaluation of cause of dyspnea      ATTENDING NOTE:  Patient has been seen and evaluated by me on 02/08/2019. I have reviewed the H&P.  Please refer to it for additional details.  I have reviewed today's vital signs, medications, labs, and imaging results.  I have reviewed and edited, as necessary, the history, review of systems, physical examination, and assessment and plan.  I have discussed my assessment and plan with the cardiology fellow.  Linda Spicer is a 87 year old female with risk factor profile is: (+) HTN, (-) diabetes, (-) hyperlipidemia, (-) tobacco use, (+) family Hx CAD [mother, sister]. The patient was diagnosed with HFpEF in Oct 2014.  She had a dobutamine ECHO (Oct 2014) that was normal.  She had a RHC in Oct 2014 that showed normal hemodynamics at baseline although the CO was mildly depressed.  With fluid challenge, the left sided filling pressures dramatically increased. The results of her prior dobutamine ECHO and RHC are noted below; she has not undergone coronary angiography.      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 She had PFTs in Aug 2015 that showed moderate lung diffusion defect.  She did not desaturate after walking 6 minutes.  Inhalers have not provided any benefit.     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  She has been recuperating gradually.      She presented in August 2016 with chest pain, dizziness, and lightheadedness.  She  was in atrial flutter at that time but converted back to NSR while in the ER.  She was started on metoprolol XL 12.5 mg daily, losartan was decreased to 50 mg daily, and spironolactone 25 mg qd was continued.  Her ECG did not show ischemic changes and her troponins were negative.  She was treated on heparin but anticoagulation was deferred and she was given ASA 81 mg qd.  She was switched from ASA to Eliquis in Nov 2016.     In Feb 2017 she was started to have recurrent atrial flutter.  She was treated with higher dose of beta blocker but developed bradycardia and near syncope and her beta blocker was stopped.  She returned to [email or web address] with tachycardia and a pacemaker was placed and she was restarted on Toprol XL 12.5 mg qd.  Her losartan was decreased to 25 mg qd and her spironolactone was continued at 25 mg qd.  She was started on Propofenone at that time.     She was seen at [email or web address] ER in July 2017 with chest pain.  It had been occurring in the setting of resuming an exercise program and the left sided chest pain was attributed to musculoskeletal pain. ECG showed atrial pacing.    .  Troponin < 0.045. CXR negative.     She returned to the ER in Aug 2017 after mixing up her medications.  Her Toprol XL had been increased to 25 mg qd but she had accidentally taken Losartan 50 mg qd instead of 25 mg qd.  She noticed HR had increased to 100-110 and she had lightheadedness.  She was returned to Toprol XL 12.5 mg qd and Losartan 25 mg qHS, and Spironolactone 25 mg qd.     She had recurrent ER visits in Nov 2017, Jan 2018, and most recently Feb 2018.  She felt palpitations, lightheadedness, and shortness of breath.  She believes she was in atrial flutter on each occasion. Her pacemaker was interrogated on 2/21/18.  Data shows 5 AT/AF episodes indicating atrial arrhythmias. This equals 7% of the time since 1/15/18. Longest episodes listed is 3 days with atrial rates up to >400bpm. Current episode appears to have started  today, 2/21/18, at 0517. Vrates during AT/AF are <110bpm about 95%. Patient has a hx of PAF.  Data shows 3 non-sustained VT episodes, 1-3sec, Vrates 162-175bpm, all episodes were 1/24/18@1044 and were actually the same episode of NSVT lasting ~6 sec. Atrial pacing at 72.1%.  Ventricular pacing at 5.1%.  Presenting rhythm is AS- (AF).     She was discharged from the ER on Apixaban 2.5 BID and Toprol XL 25 mg qd.  Her home BP have been 116-192/58-93 mm Hg and HR 60 - 66.  Her Losartan, previously 25 mg qAM, was switch to 50 mg qHS.       She was admitted to [facility] in June 2018 with fractured coccyx following a fall and was in atrial fibrillation at that time by her report. She was subseqeuntly started on Rhythmol and was cardioverted and continued on Rhythmol (July 2018).     She was seen by Dr. Comer in Feb 2018.  The six-minute walk distance (600 ft) is reduced. Her O2 saturation was 96%.  Mild restriction defect.  Mild diffusion defect. The diffusing capacity was not corrected for the patient's hemoglobin.  She remains compliant with her medications.  Of note, she had ABP with pH 7.50 and pCO2 30 confirming hyperventilation in 2014.     She had a fall in July 2018 after experiencing a fall with fractured S3.  She has been using a walker to ambulate and a wheelchair when necessary.       She presented to [facility] on Sept 13, 2018 with exhaustion.  She had SOB but this was unchanged from baseline.  She suspected she was back in AF so she contacted the EP nurses. Pacemaker interrogation showed presenting rhythm AF/ @ 60 bpm. Normal device function. AP= 0% and = 81%. No short V-V intervals recorded.  She underwent elective cardioversion by EP and was discharged to home that day.    I last saw her in clinic in Sept 2018.  I referred her for Makayla Clip procedure.  She was seen in cardiology clinic yesterday as part of the preoperative workup.   She described RUSSELL.  She had CXR with opacities at bases.  She was  transferred to [facility] for further management and presumed CHF exacerbation.  On exam, she did not appear to be in over heart failure.  Baseline NT-BNP 3000s, now 2100.  Patient on Coreg, Hydalazine, and Imdur for CHF and HTN and she notes her BPs have been running high recently.  Her ECG shows AF with 100% ventricular pacing.  Recent interrogation showed AF with 50% pacing.   She is appropriately paced.   Plan on ECHO today and possible RHC to better assess volume.   She had YOLANDA (2/1/19) showing global and regional left ventricular function is normal with an EF of 55-60%, global longitudinal strain is -21.8% (normal is less than -18.0%), mitral valve with degenerative changes and there is moderate to severe regurgitation, regurgitation primarily from mid and medial scallops. ERO 0.32 cm2 and RVol 59 mL (PISA).  Systolic flow reversal noted in 1 out of 4 pulmonary veins (LLPV). Compared to TTE study from 1/16/19, mitral regurgitation is worse.   Potential discharge today with follow up for Makayla Clip.      Emeterio Loza MD     Cardiovascular Division

## 2019-02-08 NOTE — PLAN OF CARE
Discharge    Pt being discharged to home after presenting to hospital for SOB. Pt found not to be fluid overloaded and no medication changes needed. Pt will return Monday for mitral clip procedure as scheduled. Pt alert and oriented. Pt has been 100 % paced. Pt's lungs clear. Pt noted to get RUSSELL. Pt and pt's son instructed on all discharge instructions, medications and follow up appointments, activity and diet. Pt reported no questions or complaints. Pt's telemetry removed. Pt escorted to front door via WC with all belongings safely.

## 2019-02-08 NOTE — PLAN OF CARE
1200 Observation goals  1. Echocardiogram completed - yes  2. Hemodynamically stable - yes  3. O2 sats >90% -yes      Patient is observation status based on provider's order.  Observation brochure was given. Patient stated understanding. Questions answered.

## 2019-02-08 NOTE — PLAN OF CARE
"Assumed care [id number]     Diagnosis: admitted w/ dyspnea    Assessment: .   Neuro: Alert and oriented.  Cardiac: paced, atrial fib HR 70s    Respiratory: Sating >95%  on RA.    GI/: Voiding adequate amounts on own.  Last BM yesterday  Diet/appetite:  NPO for echocardiogram  Activity: Standby + walker   Pain:  denies  Skin: WNL  LDA's: No IV access, patient refusing    New this shift: Patient became very angry when staff attempted to check blood sugar per patient care orders. Patient stating \"I'm not diabetic, that's completely unnecessary, I have been poked enough, the doctor told me that you would stop that\"    As of 1100 patient is now observation status. Patient given observation status handout.  Goals: Echocardiogram completed, hemodynamically stable, O2 sats above 90%    Plan: Mitral clip on Monday 2/11/19                "

## 2019-02-11 ENCOUNTER — HOSPITAL ENCOUNTER (OUTPATIENT)
Dept: CARDIOLOGY | Facility: CLINIC | Age: 84
DRG: 229 | End: 2019-02-11
Attending: INTERNAL MEDICINE | Admitting: INTERNAL MEDICINE
Payer: MEDICARE

## 2019-02-11 ENCOUNTER — PATIENT OUTREACH (OUTPATIENT)
Dept: CARE COORDINATION | Facility: CLINIC | Age: 84
End: 2019-02-11

## 2019-02-11 ENCOUNTER — TELEPHONE (OUTPATIENT)
Dept: INTERNAL MEDICINE | Facility: CLINIC | Age: 84
End: 2019-02-11

## 2019-02-11 ENCOUNTER — HOSPITAL ENCOUNTER (INPATIENT)
Facility: CLINIC | Age: 84
LOS: 1 days | Discharge: HOME HEALTH | DRG: 229 | End: 2019-02-12
Attending: INTERNAL MEDICINE | Admitting: INTERNAL MEDICINE
Payer: MEDICARE

## 2019-02-11 ENCOUNTER — ANESTHESIA (OUTPATIENT)
Dept: SURGERY | Facility: CLINIC | Age: 84
DRG: 229 | End: 2019-02-11
Payer: MEDICARE

## 2019-02-11 DIAGNOSIS — I34.0 MITRAL VALVE INSUFFICIENCY, UNSPECIFIED ETIOLOGY: ICD-10-CM

## 2019-02-11 DIAGNOSIS — I50.30 HEART FAILURE WITH PRESERVED EJECTION FRACTION (H): Primary | ICD-10-CM

## 2019-02-11 LAB
ALBUMIN SERPL-MCNC: 3.1 G/DL (ref 3.4–5)
ALBUMIN SERPL-MCNC: 3.6 G/DL (ref 3.4–5)
ALP SERPL-CCNC: 47 U/L (ref 40–150)
ALP SERPL-CCNC: 58 U/L (ref 40–150)
ALT SERPL W P-5'-P-CCNC: 16 U/L (ref 0–50)
ALT SERPL W P-5'-P-CCNC: 17 U/L (ref 0–50)
ANION GAP SERPL CALCULATED.3IONS-SCNC: 10 MMOL/L (ref 3–14)
ANION GAP SERPL CALCULATED.3IONS-SCNC: 9 MMOL/L (ref 3–14)
AST SERPL W P-5'-P-CCNC: 17 U/L (ref 0–45)
AST SERPL W P-5'-P-CCNC: 20 U/L (ref 0–45)
BILIRUB SERPL-MCNC: 0.4 MG/DL (ref 0.2–1.3)
BILIRUB SERPL-MCNC: 0.5 MG/DL (ref 0.2–1.3)
BUN SERPL-MCNC: 44 MG/DL (ref 7–30)
BUN SERPL-MCNC: 46 MG/DL (ref 7–30)
CALCIUM SERPL-MCNC: 8.3 MG/DL (ref 8.5–10.1)
CALCIUM SERPL-MCNC: 9.1 MG/DL (ref 8.5–10.1)
CHLORIDE SERPL-SCNC: 103 MMOL/L (ref 94–109)
CHLORIDE SERPL-SCNC: 107 MMOL/L (ref 94–109)
CK SERPL-CCNC: 104 U/L (ref 30–225)
CO2 SERPL-SCNC: 26 MMOL/L (ref 20–32)
CO2 SERPL-SCNC: 28 MMOL/L (ref 20–32)
CREAT SERPL-MCNC: 1.71 MG/DL (ref 0.52–1.04)
CREAT SERPL-MCNC: 1.85 MG/DL (ref 0.52–1.04)
ERYTHROCYTE [DISTWIDTH] IN BLOOD BY AUTOMATED COUNT: 13.1 % (ref 10–15)
ERYTHROCYTE [DISTWIDTH] IN BLOOD BY AUTOMATED COUNT: 13.1 % (ref 10–15)
GFR SERPL CREATININE-BSD FRML MDRD: 24 ML/MIN/{1.73_M2}
GFR SERPL CREATININE-BSD FRML MDRD: 26 ML/MIN/{1.73_M2}
GLUCOSE BLDC GLUCOMTR-MCNC: 83 MG/DL (ref 70–99)
GLUCOSE SERPL-MCNC: 106 MG/DL (ref 70–99)
GLUCOSE SERPL-MCNC: 85 MG/DL (ref 70–99)
HCT VFR BLD AUTO: 34.3 % (ref 35–47)
HCT VFR BLD AUTO: 37.4 % (ref 35–47)
HGB BLD-MCNC: 11.2 G/DL (ref 11.7–15.7)
HGB BLD-MCNC: 12 G/DL (ref 11.7–15.7)
INR PPP: 1.12 (ref 0.86–1.14)
MAGNESIUM SERPL-MCNC: 1.9 MG/DL (ref 1.6–2.3)
MCH RBC QN AUTO: 33.2 PG (ref 26.5–33)
MCH RBC QN AUTO: 33.9 PG (ref 26.5–33)
MCHC RBC AUTO-ENTMCNC: 32.1 G/DL (ref 31.5–36.5)
MCHC RBC AUTO-ENTMCNC: 32.7 G/DL (ref 31.5–36.5)
MCV RBC AUTO: 104 FL (ref 78–100)
MCV RBC AUTO: 104 FL (ref 78–100)
PHOSPHATE SERPL-MCNC: 3.5 MG/DL (ref 2.5–4.5)
PLATELET # BLD AUTO: 261 10E9/L (ref 150–450)
PLATELET # BLD AUTO: 308 10E9/L (ref 150–450)
POTASSIUM SERPL-SCNC: 3.8 MMOL/L (ref 3.4–5.3)
POTASSIUM SERPL-SCNC: 3.8 MMOL/L (ref 3.4–5.3)
PROT SERPL-MCNC: 6.7 G/DL (ref 6.8–8.8)
PROT SERPL-MCNC: 7.8 G/DL (ref 6.8–8.8)
RBC # BLD AUTO: 3.3 10E12/L (ref 3.8–5.2)
RBC # BLD AUTO: 3.61 10E12/L (ref 3.8–5.2)
SODIUM SERPL-SCNC: 141 MMOL/L (ref 133–144)
SODIUM SERPL-SCNC: 142 MMOL/L (ref 133–144)
WBC # BLD AUTO: 5.2 10E9/L (ref 4–11)
WBC # BLD AUTO: 8.3 10E9/L (ref 4–11)

## 2019-02-11 PROCEDURE — 40000065 ZZH STATISTIC EKG NON-CHARGEABLE

## 2019-02-11 PROCEDURE — 80053 COMPREHEN METABOLIC PANEL: CPT

## 2019-02-11 PROCEDURE — 40000275 ZZH STATISTIC RCP TIME EA 10 MIN

## 2019-02-11 PROCEDURE — 27810378 ZZH KIT, MITRACLIP & DELIVERY SYSTEM: Performed by: INTERNAL MEDICINE

## 2019-02-11 PROCEDURE — 00000146 ZZHCL STATISTIC GLUCOSE BY METER IP

## 2019-02-11 PROCEDURE — 25800030 ZZH RX IP 258 OP 636: Performed by: NURSE ANESTHETIST, CERTIFIED REGISTERED

## 2019-02-11 PROCEDURE — 25000125 ZZHC RX 250: Performed by: INTERNAL MEDICINE

## 2019-02-11 PROCEDURE — 36415 COLL VENOUS BLD VENIPUNCTURE: CPT | Performed by: INTERNAL MEDICINE

## 2019-02-11 PROCEDURE — 82550 ASSAY OF CK (CPK): CPT

## 2019-02-11 PROCEDURE — C1760 CLOSURE DEV, VASC: HCPCS | Performed by: INTERNAL MEDICINE

## 2019-02-11 PROCEDURE — 93355 ECHO TRANSESOPHAGEAL (TEE): CPT | Performed by: INTERNAL MEDICINE

## 2019-02-11 PROCEDURE — A9270 NON-COVERED ITEM OR SERVICE: HCPCS | Mod: GY | Performed by: PHYSICIAN ASSISTANT

## 2019-02-11 PROCEDURE — 25000132 ZZH RX MED GY IP 250 OP 250 PS 637: Mod: GY | Performed by: PHYSICIAN ASSISTANT

## 2019-02-11 PROCEDURE — 25000128 H RX IP 250 OP 636: Performed by: NURSE ANESTHETIST, CERTIFIED REGISTERED

## 2019-02-11 PROCEDURE — 85027 COMPLETE CBC AUTOMATED: CPT | Performed by: INTERNAL MEDICINE

## 2019-02-11 PROCEDURE — 93010 ELECTROCARDIOGRAM REPORT: CPT | Performed by: INTERNAL MEDICINE

## 2019-02-11 PROCEDURE — C1769 GUIDE WIRE: HCPCS | Performed by: INTERNAL MEDICINE

## 2019-02-11 PROCEDURE — 85610 PROTHROMBIN TIME: CPT | Performed by: INTERNAL MEDICINE

## 2019-02-11 PROCEDURE — 36415 COLL VENOUS BLD VENIPUNCTURE: CPT

## 2019-02-11 PROCEDURE — 21400000 ZZH R&B CCU UMMC

## 2019-02-11 PROCEDURE — 25000132 ZZH RX MED GY IP 250 OP 250 PS 637: Mod: GY

## 2019-02-11 PROCEDURE — 99223 1ST HOSP IP/OBS HIGH 75: CPT | Mod: 25 | Performed by: PHYSICIAN ASSISTANT

## 2019-02-11 PROCEDURE — C1894 INTRO/SHEATH, NON-LASER: HCPCS | Performed by: INTERNAL MEDICINE

## 2019-02-11 PROCEDURE — 40000014 ZZH STATISTIC ARTERIAL MONITORING DAILY

## 2019-02-11 PROCEDURE — 25000125 ZZHC RX 250: Performed by: NURSE ANESTHETIST, CERTIFIED REGISTERED

## 2019-02-11 PROCEDURE — 37000008 ZZH ANESTHESIA TECHNICAL FEE, 1ST 30 MIN: Performed by: INTERNAL MEDICINE

## 2019-02-11 PROCEDURE — 02UG3JZ SUPPLEMENT MITRAL VALVE WITH SYNTHETIC SUBSTITUTE, PERCUTANEOUS APPROACH: ICD-10-PCS | Performed by: INTERNAL MEDICINE

## 2019-02-11 PROCEDURE — 25000128 H RX IP 250 OP 636: Performed by: ANESTHESIOLOGY

## 2019-02-11 PROCEDURE — 85347 COAGULATION TIME ACTIVATED: CPT

## 2019-02-11 PROCEDURE — 27210794 ZZH OR GENERAL SUPPLY STERILE: Performed by: INTERNAL MEDICINE

## 2019-02-11 PROCEDURE — 93355 ECHO TRANSESOPHAGEAL (TEE): CPT

## 2019-02-11 PROCEDURE — 83735 ASSAY OF MAGNESIUM: CPT

## 2019-02-11 PROCEDURE — 80053 COMPREHEN METABOLIC PANEL: CPT | Performed by: INTERNAL MEDICINE

## 2019-02-11 PROCEDURE — 85027 COMPLETE CBC AUTOMATED: CPT

## 2019-02-11 PROCEDURE — 37000009 ZZH ANESTHESIA TECHNICAL FEE, EACH ADDTL 15 MIN: Performed by: INTERNAL MEDICINE

## 2019-02-11 PROCEDURE — 84100 ASSAY OF PHOSPHORUS: CPT

## 2019-02-11 PROCEDURE — A9270 NON-COVERED ITEM OR SERVICE: HCPCS | Mod: GY

## 2019-02-11 PROCEDURE — 33418 REPAIR TCAT MITRAL VALVE: CPT | Mod: Q0 | Performed by: INTERNAL MEDICINE

## 2019-02-11 DEVICE — DELIVERY SYSTEM MITRACLIP XTR CDS0601-XTR: Type: IMPLANTABLE DEVICE | Status: FUNCTIONAL

## 2019-02-11 RX ORDER — NITROGLYCERIN 0.4 MG/1
0.4 TABLET SUBLINGUAL EVERY 5 MIN PRN
Status: DISCONTINUED | OUTPATIENT
Start: 2019-02-11 | End: 2019-02-12 | Stop reason: HOSPADM

## 2019-02-11 RX ORDER — LIDOCAINE 40 MG/G
CREAM TOPICAL
Status: DISCONTINUED | OUTPATIENT
Start: 2019-02-11 | End: 2019-02-11 | Stop reason: HOSPADM

## 2019-02-11 RX ORDER — ATROPINE SULFATE 0.1 MG/ML
0.5 INJECTION INTRAVENOUS EVERY 5 MIN PRN
Status: ACTIVE | OUTPATIENT
Start: 2019-02-11 | End: 2019-02-12

## 2019-02-11 RX ORDER — FENTANYL CITRATE 50 UG/ML
25-50 INJECTION, SOLUTION INTRAMUSCULAR; INTRAVENOUS
Status: ACTIVE | OUTPATIENT
Start: 2019-02-11 | End: 2019-02-12

## 2019-02-11 RX ORDER — PROTAMINE SULFATE 10 MG/ML
INJECTION, SOLUTION INTRAVENOUS PRN
Status: DISCONTINUED | OUTPATIENT
Start: 2019-02-11 | End: 2019-02-11

## 2019-02-11 RX ORDER — NALOXONE HYDROCHLORIDE 0.4 MG/ML
.2-.4 INJECTION, SOLUTION INTRAMUSCULAR; INTRAVENOUS; SUBCUTANEOUS
Status: ACTIVE | OUTPATIENT
Start: 2019-02-11 | End: 2019-02-12

## 2019-02-11 RX ORDER — FENTANYL CITRATE 50 UG/ML
25-50 INJECTION, SOLUTION INTRAMUSCULAR; INTRAVENOUS
Status: DISCONTINUED | OUTPATIENT
Start: 2019-02-11 | End: 2019-02-11 | Stop reason: HOSPADM

## 2019-02-11 RX ORDER — SODIUM CHLORIDE, SODIUM LACTATE, POTASSIUM CHLORIDE, CALCIUM CHLORIDE 600; 310; 30; 20 MG/100ML; MG/100ML; MG/100ML; MG/100ML
INJECTION, SOLUTION INTRAVENOUS CONTINUOUS
Status: DISCONTINUED | OUTPATIENT
Start: 2019-02-11 | End: 2019-02-11 | Stop reason: HOSPADM

## 2019-02-11 RX ORDER — CLINDAMYCIN PHOSPHATE 900 MG/50ML
900 INJECTION, SOLUTION INTRAVENOUS
Status: COMPLETED | OUTPATIENT
Start: 2019-02-11 | End: 2019-02-11

## 2019-02-11 RX ORDER — LEVOTHYROXINE SODIUM 75 UG/1
75 TABLET ORAL
Status: DISCONTINUED | OUTPATIENT
Start: 2019-02-12 | End: 2019-02-12 | Stop reason: HOSPADM

## 2019-02-11 RX ORDER — ONDANSETRON 4 MG/1
4 TABLET, ORALLY DISINTEGRATING ORAL EVERY 30 MIN PRN
Status: DISCONTINUED | OUTPATIENT
Start: 2019-02-11 | End: 2019-02-11 | Stop reason: HOSPADM

## 2019-02-11 RX ORDER — EPHEDRINE SULFATE 50 MG/ML
INJECTION, SOLUTION INTRAMUSCULAR; INTRAVENOUS; SUBCUTANEOUS PRN
Status: DISCONTINUED | OUTPATIENT
Start: 2019-02-11 | End: 2019-02-11

## 2019-02-11 RX ORDER — SODIUM CHLORIDE 9 MG/ML
INJECTION, SOLUTION INTRAVENOUS CONTINUOUS
Status: DISCONTINUED | OUTPATIENT
Start: 2019-02-11 | End: 2019-02-11 | Stop reason: HOSPADM

## 2019-02-11 RX ORDER — ASPIRIN 325 MG
325 TABLET ORAL ONCE
Status: DISCONTINUED | OUTPATIENT
Start: 2019-02-11 | End: 2019-02-11 | Stop reason: HOSPADM

## 2019-02-11 RX ORDER — ASPIRIN 81 MG/1
81 TABLET, CHEWABLE ORAL DAILY
Status: DISCONTINUED | OUTPATIENT
Start: 2019-02-11 | End: 2019-02-11

## 2019-02-11 RX ORDER — LIDOCAINE 40 MG/G
CREAM TOPICAL
Status: DISCONTINUED | OUTPATIENT
Start: 2019-02-11 | End: 2019-02-12 | Stop reason: HOSPADM

## 2019-02-11 RX ORDER — DIPHENHYDRAMINE HCL 25 MG
25 CAPSULE ORAL EVERY 6 HOURS PRN
Status: DISCONTINUED | OUTPATIENT
Start: 2019-02-11 | End: 2019-02-12 | Stop reason: HOSPADM

## 2019-02-11 RX ORDER — AZATHIOPRINE 50 MG/1
50 TABLET ORAL EVERY EVENING
Status: DISCONTINUED | OUTPATIENT
Start: 2019-02-12 | End: 2019-02-12 | Stop reason: HOSPADM

## 2019-02-11 RX ORDER — LIDOCAINE HYDROCHLORIDE 20 MG/ML
INJECTION, SOLUTION INFILTRATION; PERINEURAL PRN
Status: DISCONTINUED | OUTPATIENT
Start: 2019-02-11 | End: 2019-02-11

## 2019-02-11 RX ORDER — FLUMAZENIL 0.1 MG/ML
0.2 INJECTION, SOLUTION INTRAVENOUS
Status: ACTIVE | OUTPATIENT
Start: 2019-02-11 | End: 2019-02-12

## 2019-02-11 RX ORDER — NALOXONE HYDROCHLORIDE 0.4 MG/ML
.1-.4 INJECTION, SOLUTION INTRAMUSCULAR; INTRAVENOUS; SUBCUTANEOUS
Status: DISCONTINUED | OUTPATIENT
Start: 2019-02-11 | End: 2019-02-12 | Stop reason: HOSPADM

## 2019-02-11 RX ORDER — HYDRALAZINE HYDROCHLORIDE 10 MG/1
10 TABLET, FILM COATED ORAL 3 TIMES DAILY
Status: DISCONTINUED | OUTPATIENT
Start: 2019-02-11 | End: 2019-02-12 | Stop reason: HOSPADM

## 2019-02-11 RX ORDER — ONDANSETRON 2 MG/ML
4 INJECTION INTRAMUSCULAR; INTRAVENOUS EVERY 30 MIN PRN
Status: DISCONTINUED | OUTPATIENT
Start: 2019-02-11 | End: 2019-02-11 | Stop reason: HOSPADM

## 2019-02-11 RX ORDER — FENTANYL CITRATE 50 UG/ML
INJECTION, SOLUTION INTRAMUSCULAR; INTRAVENOUS PRN
Status: DISCONTINUED | OUTPATIENT
Start: 2019-02-11 | End: 2019-02-11

## 2019-02-11 RX ORDER — ALBUTEROL SULFATE 90 UG/1
2 AEROSOL, METERED RESPIRATORY (INHALATION) EVERY 6 HOURS PRN
Status: DISCONTINUED | OUTPATIENT
Start: 2019-02-11 | End: 2019-02-12 | Stop reason: HOSPADM

## 2019-02-11 RX ORDER — SODIUM CHLORIDE, SODIUM LACTATE, POTASSIUM CHLORIDE, CALCIUM CHLORIDE 600; 310; 30; 20 MG/100ML; MG/100ML; MG/100ML; MG/100ML
INJECTION, SOLUTION INTRAVENOUS CONTINUOUS
Status: DISCONTINUED | OUTPATIENT
Start: 2019-02-11 | End: 2019-02-11

## 2019-02-11 RX ORDER — HEPARIN SODIUM 1000 [USP'U]/ML
INJECTION, SOLUTION INTRAVENOUS; SUBCUTANEOUS PRN
Status: DISCONTINUED | OUTPATIENT
Start: 2019-02-11 | End: 2019-02-11

## 2019-02-11 RX ORDER — ONDANSETRON 2 MG/ML
INJECTION INTRAMUSCULAR; INTRAVENOUS PRN
Status: DISCONTINUED | OUTPATIENT
Start: 2019-02-11 | End: 2019-02-11

## 2019-02-11 RX ORDER — NALOXONE HYDROCHLORIDE 0.4 MG/ML
.1-.4 INJECTION, SOLUTION INTRAMUSCULAR; INTRAVENOUS; SUBCUTANEOUS
Status: ACTIVE | OUTPATIENT
Start: 2019-02-11 | End: 2019-02-12

## 2019-02-11 RX ORDER — DEXAMETHASONE SODIUM PHOSPHATE 4 MG/ML
INJECTION, SOLUTION INTRA-ARTICULAR; INTRALESIONAL; INTRAMUSCULAR; INTRAVENOUS; SOFT TISSUE PRN
Status: DISCONTINUED | OUTPATIENT
Start: 2019-02-11 | End: 2019-02-11

## 2019-02-11 RX ORDER — LIDOCAINE HYDROCHLORIDE 10 MG/ML
INJECTION, SOLUTION EPIDURAL; INFILTRATION; INTRACAUDAL; PERINEURAL
Status: DISCONTINUED | OUTPATIENT
Start: 2019-02-11 | End: 2019-02-11 | Stop reason: HOSPADM

## 2019-02-11 RX ORDER — FUROSEMIDE 20 MG
20 TABLET ORAL DAILY
Status: DISCONTINUED | OUTPATIENT
Start: 2019-02-11 | End: 2019-02-12 | Stop reason: HOSPADM

## 2019-02-11 RX ORDER — CARVEDILOL 3.12 MG/1
3.12 TABLET ORAL 2 TIMES DAILY WITH MEALS
Status: DISCONTINUED | OUTPATIENT
Start: 2019-02-11 | End: 2019-02-12 | Stop reason: HOSPADM

## 2019-02-11 RX ORDER — ASPIRIN 81 MG/1
81 TABLET ORAL DAILY
Status: DISCONTINUED | OUTPATIENT
Start: 2019-02-12 | End: 2019-02-12 | Stop reason: HOSPADM

## 2019-02-11 RX ORDER — PROPOFOL 10 MG/ML
INJECTION, EMULSION INTRAVENOUS PRN
Status: DISCONTINUED | OUTPATIENT
Start: 2019-02-11 | End: 2019-02-11

## 2019-02-11 RX ADMIN — LIDOCAINE HYDROCHLORIDE 100 MG: 20 INJECTION, SOLUTION INFILTRATION; PERINEURAL at 08:27

## 2019-02-11 RX ADMIN — FUROSEMIDE 20 MG: 20 TABLET ORAL at 17:32

## 2019-02-11 RX ADMIN — APIXABAN 2.5 MG: 2.5 TABLET, FILM COATED ORAL at 20:49

## 2019-02-11 RX ADMIN — DEXAMETHASONE SODIUM PHOSPHATE 8 MG: 4 INJECTION, SOLUTION INTRA-ARTICULAR; INTRALESIONAL; INTRAMUSCULAR; INTRAVENOUS; SOFT TISSUE at 09:10

## 2019-02-11 RX ADMIN — PHENYLEPHRINE HYDROCHLORIDE 100 MCG: 10 INJECTION, SOLUTION INTRAMUSCULAR; INTRAVENOUS; SUBCUTANEOUS at 09:05

## 2019-02-11 RX ADMIN — Medication 5 MG: at 08:27

## 2019-02-11 RX ADMIN — SODIUM CHLORIDE, POTASSIUM CHLORIDE, SODIUM LACTATE AND CALCIUM CHLORIDE: 600; 310; 30; 20 INJECTION, SOLUTION INTRAVENOUS at 08:10

## 2019-02-11 RX ADMIN — PROPOFOL 60 MG: 10 INJECTION, EMULSION INTRAVENOUS at 08:27

## 2019-02-11 RX ADMIN — CLINDAMYCIN PHOSPHATE 900 MG: 18 INJECTION, SOLUTION INTRAVENOUS at 08:41

## 2019-02-11 RX ADMIN — HYDRALAZINE HYDROCHLORIDE 10 MG: 10 TABLET, FILM COATED ORAL at 20:29

## 2019-02-11 RX ADMIN — ONDANSETRON 4 MG: 2 INJECTION INTRAMUSCULAR; INTRAVENOUS at 10:54

## 2019-02-11 RX ADMIN — FENTANYL CITRATE 150 MCG: 50 INJECTION, SOLUTION INTRAMUSCULAR; INTRAVENOUS at 08:27

## 2019-02-11 RX ADMIN — HEPARIN SODIUM 7000 UNITS: 1000 INJECTION, SOLUTION INTRAVENOUS; SUBCUTANEOUS at 09:28

## 2019-02-11 RX ADMIN — PHENYLEPHRINE HYDROCHLORIDE 100 MCG: 10 INJECTION, SOLUTION INTRAMUSCULAR; INTRAVENOUS; SUBCUTANEOUS at 10:10

## 2019-02-11 RX ADMIN — PROTAMINE SULFATE 70 MG: 10 INJECTION, SOLUTION INTRAVENOUS at 10:53

## 2019-02-11 RX ADMIN — ROCURONIUM BROMIDE 50 MG: 10 INJECTION INTRAVENOUS at 08:27

## 2019-02-11 RX ADMIN — SUGAMMADEX 100 MG: 100 INJECTION, SOLUTION INTRAVENOUS at 11:06

## 2019-02-11 RX ADMIN — SODIUM CHLORIDE, POTASSIUM CHLORIDE, SODIUM LACTATE AND CALCIUM CHLORIDE: 600; 310; 30; 20 INJECTION, SOLUTION INTRAVENOUS at 08:40

## 2019-02-11 RX ADMIN — RANITIDINE 150 MG: 150 TABLET ORAL at 20:29

## 2019-02-11 RX ADMIN — CARVEDILOL 3.12 MG: 3.12 TABLET, FILM COATED ORAL at 17:32

## 2019-02-11 RX ADMIN — PHENYLEPHRINE HYDROCHLORIDE 200 MCG: 10 INJECTION, SOLUTION INTRAMUSCULAR; INTRAVENOUS; SUBCUTANEOUS at 08:45

## 2019-02-11 RX ADMIN — PHENYLEPHRINE HYDROCHLORIDE 100 MCG: 10 INJECTION, SOLUTION INTRAMUSCULAR; INTRAVENOUS; SUBCUTANEOUS at 09:20

## 2019-02-11 RX ADMIN — PHENYLEPHRINE HYDROCHLORIDE 100 MCG: 10 INJECTION, SOLUTION INTRAMUSCULAR; INTRAVENOUS; SUBCUTANEOUS at 09:32

## 2019-02-11 RX ADMIN — Medication 5 MG: at 09:20

## 2019-02-11 RX ADMIN — HEPARIN SODIUM 3000 UNITS: 1000 INJECTION, SOLUTION INTRAVENOUS; SUBCUTANEOUS at 10:07

## 2019-02-11 RX ADMIN — HYDRALAZINE HYDROCHLORIDE 10 MG: 10 TABLET, FILM COATED ORAL at 16:43

## 2019-02-11 ASSESSMENT — ACTIVITIES OF DAILY LIVING (ADL)
ADLS_ACUITY_SCORE: 19
TOILETING: 1-->ASSISTIVE EQUIPMENT
AMBULATION: 1-->ASSISTIVE EQUIPMENT
ADLS_ACUITY_SCORE: 19
FALL_HISTORY_WITHIN_LAST_SIX_MONTHS: NO
DEPENDENT_IADLS:: COOKING;CLEANING;LAUNDRY;SHOPPING;MEDICATION MANAGEMENT;MONEY MANAGEMENT;TRANSPORTATION
COGNITION: 0 - NO COGNITION ISSUES REPORTED
RETIRED_COMMUNICATION: 0-->UNDERSTANDS/COMMUNICATES WITHOUT DIFFICULTY
TRANSFERRING: 1-->ASSISTIVE EQUIPMENT
DRESS: 0-->INDEPENDENT
BATHING: 2-->ASSISTIVE PERSON
SWALLOWING: 0-->SWALLOWS FOODS/LIQUIDS WITHOUT DIFFICULTY
RETIRED_EATING: 0-->INDEPENDENT

## 2019-02-11 ASSESSMENT — MIFFLIN-ST. JEOR: SCORE: 1138.06

## 2019-02-11 NOTE — PROGRESS NOTES
Social Work Services Consult Note:     SW met with pt and family to introduce self and role in consult.  Pt and Family are requesting help with updating pt's healthcare directive.  Pt and family explained that pt's wishes are as follows:      - If in CARDIAC arrest - pt does not want CPR/intubation.   - If in RESPIRATORY arrest - pt would want short term intubation and no CPR.  Pt does not want prolonged intubation if she has no meaningful chance of recovery as indicated by the medical team.    LAURA assisted with completing a new healthcare directive to reflect pt's wishes.  A copy was sent to [facility] to upload to pt's chart.    DENVER Bass, APSW  [address]   Phone: [phone]  Pager: [phone]  Unit: [phone]

## 2019-02-11 NOTE — Clinical Note
Safari Wire removed and New Safari Inserted, MPA1 Used to Inserted [place] and Dilate the Access Site

## 2019-02-11 NOTE — LETTER
Transition Communication Hand-off for Care Transitions to Next Level of Care Provider  Name: Linad Spicer  : 1931  MRN #: [id number]  Primary Care Provider: Tre Lockett  Primary Clinic: [facility] [address]  [place] [address]  Reason for Hospitalization:  Severe Mitral Regurge  Mitral regurgitation-clip done  Admit Date/Time: 2019  5:22 AM  Discharge Date:19  Payor Source: Payor: MEDICARE / Plan: MEDICARE / Product Type: Medicare /   Readmission Assessment Measure (ZAHIRA) Risk Score/category: High ZAHIRA/95% Risk  Reason for Communication Hand-off Referral: Admission diagnoses: CHF  Fragility  Other Known Patient  Discharge Plan: Return to [facility] with existing services  Concern for non-adherence with plan of care: No  Discharge Needs Assessment:  Needs      Most Recent Value   Assisted Living  -- [[facility]   Home Care  -- [[facility]]      Follow-up specialty is recommended: No    Follow-up plan:    Future Appointments   Date Time Provider Department Center   2019  6:00 AM Mario Carias OT [facility]   2/15/2019 10:00 AM Asia Steven PA-C [facility] [place]   2019  9:30 AM BAY 7 INFUSION [facility] [place]   3/1/2019 10:00 AM Asia Steven PA-C [facility] [place]   3/26/2019 10:00 AM BAY 8 INFUSION [facility] [place]   2019  9:30 AM Minna Gallagher APRN CNP [facility]   2019 12:00 AM [facility]   2019 10:00 AM Mario Davenport MD [facility] [place]   Any outstanding tests or procedures:        Referrals     Future Labs/Procedures    Cardiac Rehab Referral     Process Instructions:    Advance to Wellness and Exercise for Life (WEL) Program or to another maintenance exercise program upon completion of supervised exercise therapy.    Weight mgt program is only offered at [facility].    *This therapy referral will be filtered to a centralized scheduling office at [facility] and the patient  will receive a call to schedule an appointment at a [place] location most convenient for them. *        Comments:    If you have not heard from the scheduling office within 2 business days, please call [phone] for all locations, with the exception of [place], please call [phone] and [facility] [place], please call [phone].    Please be aware that coverage of these services is subject to the terms and limitations of your health insurance plan.  Call member services at your health plan with any benefit or coverage questions.    Home care nursing referral     Comments:    Home Care:  Agency:  [facility]  Phone # [phone]  Fax # [phone]    To RESUME providing: Skilled nurse visits - frequency per home care evaluation or previous orders    RN to assess --- hydration, nutrition and bowel status, signs/symptoms of infection, neurologic status, skin integrity, respiratory and cardiac status, pain level and activity tolerance, vital signs and weight, lab draws if ordered, home safety.     RN to teach/reinforce teaching ---medication, disease, and symptom management    RN to assist with communication to --- Primary Care Provider    Your provider has ordered home care nursing services. If you have not been contacted within 2 days of your discharge please call the home care agency number listed above.    Home Care OT Referral for Hospital Discharge     Comments:    OT to eval and treat  Agency: [facility]  Your provider has ordered home care - occupational therapy. If you have not been contacted within 2 days of your discharge please call the department phone number listed on the top of this document.    Home Care PT Referral for Hospital Discharge     Comments:    PT to eval and treat  Agency-[facility]  Your provider has ordered home care - physical therapy. If you have not been contacted within 2 days of your discharge please call the department phone number listed on the top of this document.     Medication Therapy Management Referral     Comments:    MTM referral reason            Patient had a hospital or ED visit in last 6 months and has more than 10   PTA or Discharge medications    Patient has 5 PTA or Discharge Medications AND one of the following   diagnoses: DM,HF,COPD,AMI DX,PULM HTN       This service is designed to help you get the most from your medications.  A specially trained pharmacist will work closely with you and your doctors  to solve any problems related to your medications and to help you get the   best results from taking them.      The Medication Therapy Management staff will call you to schedule an appointment.            Key Recommendations:  Will Return to [facility] with HC and close follow up    John Linda    AVS/Discharge Summary is the source of truth; this is a helpful guide for improved communication of patient story

## 2019-02-11 NOTE — H&P
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 She is admitted for a planned mireille-clip procedure given her multiple recent presentations and admissions for shortness of breath.     # Moderate-severe mitral regurgitation with recurrent admissions  # HFpEF (EF 55-60%) with restrictive physiology on recent cath  # Mild-moderate primary pulmonary hypertension likely from PF rom RA  Admitted with worsening RUSSELL although patient does have known mod-severe MR that appears stable on multiple TTE's in the last year. Patient with several recent admissions for tachypnea, shortness of breath; consider that patient's HTN contributing.  Unlikely afib as etiology. Repeat echocardiogram(s) unchanged with normal IVC, normal LV EF, RA 3 mmhg. RHC/LHC (full report below) with equalization of LV and RV end diastolic pressure at 15 mmHg after the fluid challenge with concordant respiratory variability suggestive of restrictive physiology. Her most recent admission 2/7-2/8 for consideration of inpatient mireille-clip after she was seen in clinic and was notably tachypneic and short of breath. She was brought in for medical optimization and inpatient mitral clip however patient appeared well compensated on exam, felt she was not in acute exacerbation of CHF from MR, and she was discharged. She returns today for planned mireille-clip procedure. She is now status post mireille-clip performed without complication. The  clip was successfully deployed in the A2/P2 position and MR was trace following clip with a gradient of 5 mmHg, no significant valve stenosis.    -DAPT with ASA 81 mg daily and apixaban  -Bed rest per protocol   -Neuro check per protocol   -Telemetry  - I/O, daily weights   -Salt restrict, fluid restrict, continue Lasix 40 mg AM/20 mg PM   -TTE tomorrow to confirm appropriately functioning mireille-clip device  -CXR tomorrow  - Continue PTA Coreg 3.125mg BID, Lasix 20 mg BID, Hydralazine 25 mg TID, Imdur 60 mg daily as tolerated post-procedurally      # Atrial fibrillation   # Tachy-lisa syndrome s/p dual chamber pacemaker  Rate controlled, on apixaban at home; held 2/9 in preparation for MitraClip procedure Monday. Appropriately paced.   -Resume rate control with Coreg 3.125 mg BID   -Resume AC with apixaban tonight unless complication such as hematoma      Chronic Conditions:  # Pulmonary fibrosis  # Rheumatoid arthritis   # CKD baseline Cr 1.5-1.8  # HTN      CODE: DNR, short term intubation OK for respiratory distress  FENA:  Advance diet as tolerated, 2G salt restriction, 2L fluid restriction  DVT ppx: apixaban  Disposition: Admitted to inpatient, likely to discharge in 24-48 hours post-procedure    HISTORY OF PRESENT ILLNESS:  Linda Spicer is a 87 year old DNR female with history of moderate-severe mitral regurgitation, HFpEF (EF 55-60%) with restrictive physiology on recent cath, atrial fibrillation, tachy-lisa syndrome s/p dual chamber pacemaker, pulmonary fibrosis from rheumatoid arthritis (low normal diffusion on most recent PFTs), mild-moderate primary pulmonary hypertension, CKD baseline Cr 1.5-1.8, and HTN. She is admitted for a planned mireille-clip for Monday.    Weights  Wt Readings from Last 10 Encounters:   02/11/19 72.6 kg (160 lb 0.9 oz)   02/08/19 71.7 kg (158 lb)   02/07/19 73.5 kg (162 lb)   01/29/19 73.9 kg (162 lb 14.4 oz)   01/19/19 73.5 kg (162 lb)   01/15/19 73.9 kg (163 lb)   01/05/19 73.9  kg (163 lb)   01/03/19 73.9 kg (163 lb)   12/27/18 73.9 kg (162 lb 14.4 oz)   12/20/18 73.5 kg (162 lb)       PAST MEDICAL HISTORY:  Reviewed with patient on 02/11/2019     Past Medical History:   Diagnosis Date     Adjustment disorder with anxious mood 5/18/2015     Advanced directives, counseling/discussion 8/30/2012    Patient states has Advance Directive and will bring in a copy to clinic. 8/30/2012   Tevin May  [facility] Medical Assistant \       Anemia 9/25/2015     Basal cell cancer      CHF (congestive heart failure) (H) 9/18/2014     CKD (chronic kidney disease) stage 3, GFR 30-59 ml/min (H) 9/29/2015     DDD (degenerative disc disease), lumbar 9/25/2015     Diffuse idiopathic pulmonary fibrosis (H) 5/6/2013     Encounter for palliative care 5/18/2015     History of blood transfusion 9/29/2015     Hypertension goal BP (blood pressure) < 140/90 9/7/2012     Hypothyroid 9/7/2012     Irritable bowel syndrome 10/29/2013     Macular degeneration      Macular degeneration, 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     Nondisplaced spiral fracture of shaft of humerus      Osteoporosis 8/13/2013     Imo Update utility     RA (rheumatoid arthritis) (H) 5/7/2013     Rheumatic fever      Shingles      Spinal stenosis of lumbar region with neurogenic claudication 9/14/2015       Past Surgical History:   Procedure Laterality Date     ANESTHESIA CARDIOVERSION N/A 9/14/2018    Procedure: ANESTHESIA CARDIOVERSION;;  Surgeon: GENERIC ANESTHESIA PROVIDER;  Location: [facility] OR     ANESTHESIA CARDIOVERSION N/A 10/11/2018    Procedure: ANESTHESIA CARDIOVERSION;  Cardioversion;  Surgeon: GENERIC ANESTHESIA PROVIDER;  Location: [facility] OR     ANESTHESIA CARDIOVERSION N/A 10/16/2018    Procedure: Anesthesia Cardioversion ;  Surgeon: GENERIC ANESTHESIA PROVIDER;  Location: [facility] OR     APPENDECTOMY       BIOPSY      hemorrhoidectomy     ENT SURGERY      tonsillectomy     GYN SURGERY      3 D & C's     HYSTERECTOMY, PAP NO LONGER INDICATED       LAMINECTOMY LUMBAR  ONE LEVEL N/A 10/13/2015    Procedure: LAMINECTOMY LUMBAR ONE LEVEL;  Surgeon: Fransico Toussaint MD;  Location: [facility] OR        MEDICATIONS:  PTA Meds  Prior to Admission medications    Medication Sig Last Dose Taking? Auth Provider   abatacept (ORENCIA) 250 MG injection Inject 750 mg into the vein every 28 days Past Month at Unknown time Yes Mariola Angeles MD   aspirin (ASA) 325 MG EC tablet Take 325 mg by mouth every 6 hours as needed for moderate pain 2/11/2019 at 0400 Yes Reported, Patient   azaTHIOprine (IMURAN) 50 MG tablet Take 1 tablet (50 mg) by mouth daily  Patient taking differently: Take 50 mg by mouth every evening  2/11/2019 at Unknown time Yes Mariola Angeles MD   calcium carbonate-vitamin D (OS-FATOUMATA) 500-400 MG-UNIT tablet Take 1 tablet by mouth daily Past Week at Unknown time Yes Mariola Angeles MD   Carboxymethylcellulose Sod PF (CELLUVISC/REFRESH LIQUIGEL) 1 % ophthalmic gel Place 1 drop into both eyes daily as needed for dry eyes 2/10/2019 at Unknown time Yes Mariola Angeles MD   carvedilol (COREG) 3.125 MG tablet Take 1 tablet (3.125 mg) by mouth 2 times daily (with meals) 2/11/2019 at 0400 Yes Mariola Angeles MD   cetirizine (ZYRTEC ALLERGY) 10 MG tablet Take 1 tablet (10 mg) by mouth At Bedtime 2/10/2019 at Unknown time Yes Mariola Angeles MD   fish oil-omega-3 fatty acids 1000 MG capsule Take 1 capsule (1 g) by mouth 2 times daily Past Week at Unknown time Yes Mariola Angeles MD   folic acid (FOLVITE) 1 MG tablet Take 1 tablet (1 mg) by mouth daily 2/11/2019 at 0400 Yes Mariola Angeles MD   furosemide (LASIX) 20 MG tablet Take 1 tablet (20 mg) by mouth 2 times daily Hold Lasix 12/18 and 12/19, then start 20 mg daily after that.  Patient taking differently: 40mg by mouth every morning and 20mg by mouth every afternoon 2/11/2019 at 0400 Yes Mariola Angeles MD   hydrALAZINE (APRESOLINE) 10 MG tablet Take 10 mg by mouth 3 times daily 2/11/2019 at  Unknown time Yes Unknown, Entered By History   isosorbide mononitrate (IMDUR) 60 MG 24 hr tablet Take 1 tablet (60 mg) by mouth daily 2/10/2019 at Unknown time Yes Mariola Angeles MD   levothyroxine (SYNTHROID/LEVOTHROID) 75 MCG tablet Take 1 tablet (75 mcg) by mouth daily 2/11/2019 at 0400 Yes Mariola Angeles MD   Multiple Vitamins-Minerals (PRESERVISION AREDS) CAPS Take 1 capsule by mouth 2 times daily Past Week at Unknown time Yes Mariola Angeles MD   ranitidine (ZANTAC) 150 MG tablet Take 1 tablet (150 mg) by mouth 2 times daily  Patient taking differently: Take 150 mg by mouth At Bedtime  2/10/2019 at 2000 Yes Mariola Angeles MD   trimethoprim (TRIMPEX) 100 MG tablet Take 0.5 tablets (50 mg) by mouth daily 2/10/2019 at Unknown time Yes Mariola Angeles MD   vitamin C (ASCORBIC ACID) 500 MG tablet Take 1 tablet (500 mg) by mouth daily 2/10/2019 at Unknown time Yes Mariola Angeles MD   acetaminophen (TYLENOL) 500 MG tablet Take 1 tablet (500 mg) by mouth every 6 hours as needed for mild pain or fever  Patient not taking: Reported on 2/7/2019   Mariola Angeles MD   albuterol (PROVENTIL) (2.5 MG/3ML) 0.083% neb solution Take 1 vial (2.5 mg) by nebulization every 6 hours as needed for shortness of breath / dyspnea or wheezing  Patient not taking: Reported on 2/7/2019   Mariola Angeles MD   apixaban ANTICOAGULANT (ELIQUIS) 2.5 MG tablet Take 1 tablet (2.5 mg) by mouth 2 times daily Stop taking 2/9 (last dose evening of Feb 8, 2019) in preparation for MitraClip Procedure on Monday 2/8/2019  Bia Tapia CNP   denosumab (PROLIA) 60 MG/ML SOLN injection Inject 1 mL (60 mg) Subcutaneous every 6 months More than a month at Unknown time  Peggy Ramos MD   diphenhydrAMINE (BENADRYL) 25 MG tablet Take 25 mg by mouth as needed Patient takes 25-50mg 1-2 times a day. More than a month at Unknown time  Reported, Patient   nitroGLYcerin (NITROSTAT) 0.4 MG  sublingual tablet Place 1 tablet (0.4 mg) under the tongue every 5 minutes as needed for chest pain  Patient not taking: Reported on 2/7/2019   Mariola Angeles MD   order for DME Dispense SP Walker-small   Tre Lockett MD   order for DME Equipment being ordered: Dispense baffle, for use with nebulizer.   Tre Lockett MD   order for DME Equipment being ordered: Nebulizer. Use with Albuterol.   Tre Lockett MD   order for DME Equipment being ordered: Dispense face mask.  Mrs. Spicer is immunosuppressed due to rheumatoid arthritis.   Tre Lockett MD   ranibizumab (LUCENTIS) 0.3 MG/0.05ML SOLN 0.05 mLs (0.3 mg) by Intravitreal route See Admin Instructions Every 6 weeks   Mariola Angeles MD   saccharomyces boulardii (FLORASTOR) 250 MG capsule Take 1 capsule (250 mg) by mouth daily More than a month at Unknown time  Mariola Angeles MD   senna-docusate (SENOKOT-S/PERICOLACE) 8.6-50 MG tablet Take 1 tablet by mouth daily as needed for constipation More than a month at Unknown time  Mariola Angeles MD      Current Meds     Current Facility-Administered Medications   Medication     fentaNYL (PF) (SUBLIMAZE) injection 25-50 mcg     fentaNYL (PF) (SUBLIMAZE) injection 25-50 mcg     lactated ringers infusion     lactated ringers infusion     ondansetron (ZOFRAN-ODT) ODT tab 4 mg    Or     ondansetron (ZOFRAN) injection 4 mg     ondansetron (ZOFRAN-ODT) ODT tab 4 mg    Or     ondansetron (ZOFRAN) injection 4 mg     prochlorperazine (COMPAZINE) injection 5 mg     prochlorperazine (COMPAZINE) injection 5 mg       Infusion Meds    lactated ringers       - MEDICATION INSTRUCTIONS -       sodium chloride         ALLERGIES:    Allergies   Allergen Reactions     Cephalexin Hcl Diarrhea     Gabapentin Other (See Comments)     Dizzsiness     Naproxen GI Disturbance     Perfume      Macrobid [Nitrofurantoin Anhydrous]      Possibly related to lung disease      Seasonal Allergies       "Sulfa Drugs      Throat swelling     Xanax [Alprazolam] Other (See Comments)     Dizziness      Ciprofloxacin Itching and Rash       REVIEW OF SYSTEMS:  A 10 point review of systems was negative except as noted above.    SOCIAL HISTORY:   Social History     Socioeconomic History     Marital status:      Spouse name: Not on file     Number of children: Not on file     Years of education: Not on file     Highest education level: Not on file   Social Needs     Financial resource strain: Not on file     Food insecurity - worry: Not on file     Food insecurity - inability: Not on file     Transportation needs - medical: Not on file     Transportation needs - non-medical: Not on file   Occupational History     Not on file   Tobacco Use     Smoking status: Never Smoker     Smokeless tobacco: Never Used   Substance and Sexual Activity     Alcohol use: Yes     Comment: rare wine      Drug use: No     Sexual activity: No   Other Topics Concern     Parent/sibling w/ CABG, MI or angioplasty before 65F 55M? No   Social History Narrative    . Has six children. She enjoys [employer] and [employer].  passed away.  Her son has financial and alcohol issues.         FAMILY MEDICAL HISTORY:   Family History   Problem Relation Age of Onset     Hypertension Mother      Psychotic Disorder Father      Diabetes Son      Diabetes Daughter      Blood Disease Daughter          PHYSICAL EXAM:   Temp  Av.6  F (36.4  C)  Min: 97.6  F (36.4  C)  Max: 97.6  F (36.4  C)      Pulse  Av  Min: 73  Max: 73 Resp  Av  Min: 18  Max: 18  SpO2  Av %  Min: 99 %  Max: 99 %       /78   Pulse 70   Temp 97.4  F (36.3  C) (Oral)   Resp 18   Ht 1.613 m (5' 3.5\")   Wt 72.6 kg (160 lb 0.9 oz)   LMP  (LMP Unknown)   SpO2 100%   BMI 27.91 kg/m         GENERAL APPEARANCE: Alert and NAD  Head: NC/AT  EYES: PERRL, pupils equal  HENT: Mouth without ulcers or lesions. Superficial lip lac.  NECK: Supple, no goiter  Pulmonary: " coarse bilaterally on anterior exam (bedrest) no clubbing or cyanosis  CV: Regular rhythm, normal rate, no rub. JVP flat. Soft BRIANA. Wound sites clean and dry, no hematoma or bruit, no fluctuance  GI: Soft, nontender, normal bowel sounds, no HSM   MS: No evidence of inflammation in joints, no muscle tenderness  SKIN: No rash, warm, dry  NEURO: Mentation intact and speech normal.     LABS:   CMP  Recent Labs   Lab 02/11/19  0625 02/08/19  0548 02/07/19  2213    139 141   POTASSIUM 3.8 4.2 5.0   CHLORIDE 103 106 107   CO2 28 25 24   ANIONGAP 10 8 10   GLC 85 92 122*   BUN 46* 41* 40*   CR 1.85* 1.53* 1.38*   GFRESTIMATED 24* 30* 34*   GFRESTBLACK 28* 35* 40*   FATOUMATA 9.1 8.5 8.6   PROTTOTAL 7.8  --  6.9   ALBUMIN 3.6  --  3.1*   BILITOTAL 0.5  --  0.4   ALKPHOS 58  --  51   AST 17  --  27   ALT 17  --  18     CBC  Recent Labs   Lab 02/11/19  0625 02/07/19  2213   HGB 12.0 11.8   WBC 5.2 7.2   RBC 3.61* 3.59*   HCT 37.4 35.5   * 99   MCH 33.2* 32.9   MCHC 32.1 33.2   RDW 13.1 13.3    286     INR  Recent Labs   Lab 02/11/19  0625   INR 1.12     ABGNo lab results found in last 7 days.     IMAGING:  Echocardiogram 2/8/19:  Interpretation Summary  Normal left and right ventricular size and function.  Mitral insufficiency appears mild in severity.  Pulmonary artery systolic pressure is normal.  The inferior vena cava was normal in size with preserved respiratory  variability.  Estimated mean right atrial pressure is 3 mmHg (normal).     Compared to the last transthoracic study on 1/16/19 the MR looks essentially  Unchanged.    TTE 1/16/19  Global and regional left ventricular function is normal with an EF of 60-65%.  Right ventricular function, chamber size, wall motion, and thickness are  Normal. The inferior vena cava is normal. No pericardial effusion is present.    Transesophageal echocardiogram 10/16/18  The left atrial appendage is normal. It is free of spontaneous echo contrast and thrombus.  No  transgastric images obtained due to patient discomfort.     The Ejection Fraction is estimated at 55-60%.  Global right ventricular function is normal.  Moderate to severe mitral insufficiency is present.  Moderate tricuspid insufficiency is present.  Multiple MR jets noted. There is blunted systolic forward flow in 2 of the  pulmonary veins. MR volume is estimated at 34ml, however this likely  underestimates MR due to multiple jets.     Compared to prior TTE on 10/9/2018, MR appears to be worse.     Coronary Angiogram/RHC 1/31/19:  RHC  BSA 1.83  1. HR 67 bpm  2. /83/118 mmHg  3. RA 7/8/6   4. RV 46/5  5. PA 46/18/32   6. PCW 16/16/13   7. PA sat 61%   8. PCW sat --%  9. Hgb 12 g/dL   10. Adrienne CO 2.5   11. Adrienne CI 1.4   12. TD CO 2.7   13. TD CI 1.4   14. PVR 7.8      LHC  1. LVEDP 15 mmHg.  2. No gradient across the aortic valve on pull back.  3. 1.0 liter of normal saline was given prior to performing simultaneous LV/RV pressures to evaluate for restrictive/constrictive physiology given markedly reduced cardiac index of 1.4 by Adrienne and TD. There was equalization of LV and RV end diastolic pressure at 15 mmHg after the fluid challenge with concordant respiratory variability suggestive of restrictive physiology.    ARTEM Alvarado  CSI  Pager: [phone]        I have seen and examined the patient and agree with the finding and plan.       Avila Watson MD  Cardiology-[facility]  [phone]

## 2019-02-11 NOTE — ANESTHESIA CARE TRANSFER NOTE
Patient: Linda Spicer    Procedure(s):  Mitraclip Procedure    Diagnosis: Severe Mitral Regurge  Diagnosis Additional Information: No value filed.    Anesthesia Type:   No value filed.     Note:  Airway :Face Mask  Patient transferred to:PACU  Comments:   -on 6L O2 via FM, Pt Spont.  breathing, awake & alert, monitors placed, RN at bedside, VSS, no airway      Vitals: (Last set prior to Anesthesia Care Transfer)    CRNA VITALS  2/11/2019 1053 - 2/11/2019 1123      2/11/2019             ART BP:  0/0  (Abnormal)     ART Mean:  0    Resp Rate (set):  10                Electronically Signed By: SUNNI Evangelista CRNA  February 11, 2019  11:23 AM

## 2019-02-11 NOTE — ANESTHESIA POSTPROCEDURE EVALUATION
Anesthesia POST Procedure Evaluation    Patient: Linda Spicer   MRN:     [id number] Gender:   female   Age:    87 year old :      1931        Preoperative Diagnosis: Severe Mitral Regurge   Procedure(s):  Mitraclip Procedure   Postop Comments: No value filed.       Anesthesia Type:  General    Reportable Event: NO     PAIN: Uncomplicated   Sign Out status: Comfortable, Well controlled pain     PONV: No PONV   Sign Out status:  No Nausea or Vomiting     Neuro/Psych: Uneventful perioperative course   Sign Out Status: Preoperative baseline; Age appropriate mentation     Airway/Resp.: Uneventful perioperative course   Sign Out Status: Non labored breathing, age appropriate RR; Resp. Status within EXPECTED Parameters     CV: Uneventful perioperative course   Sign Out status: Appropriate BP and perfusion indices; Appropriate HR/Rhythm     Disposition:   Sign Out in:  PACU  Disposition:  Floor  Recovery Course: Uneventful  Follow-Up: Not required           Last Anesthesia Record Vitals:  CRNA VITALS  2019 1046 - 2019 1146      2019             ART BP:  0/0  (Abnormal)     ART Mean:  0    Resp Rate (set):  10          Last PACU/Preop Vitals:  Vitals:    19 1330 19 1344 19 1400   BP: 116/67  123/64   Pulse: 71     Resp:    Temp:   36.6  C (97.8  F)   SpO2:  100% 100%         Electronically Signed By: Nelson Munguia MD, 2019, 2:47 PM

## 2019-02-11 NOTE — PROGRESS NOTES
Social Work Services Progress Note     Hospital Day: 1  Date of Initial Social Work Evaluation:  Not completed for this admission  Collaborated with:  Son (caregiver), CSI team     Data: Pt is a 87 year old female being followed by LAURA for concerns regarding code status.     Intervention:  SW received note from charge RN that pt and family were concerned about pt's code status.  SW reviewed pt's wishes from Friday with CSI team.  CSI JOSE will adjust pt's code status this afternoon and confirm with family.     - Referrals in Process:    None     Assessment: Pt and family concerned about code status.  CSI JOSE to confirm change with family this afternoon.     Plan:    Anticipated Disposition: TBD    Barriers to d/c plan: Medical stability    Follow Up: SW to follow if pt has needs.     DENVER Bass, JOSÉ LUISW  6[address] Unit   Phone: [phone]  Pager: [phone]  Unit: [phone]

## 2019-02-11 NOTE — PROCEDURES
MITRACLIP PROCEDURE REPORT:     PROCEDURES PERFORMED:   1. Successful transcatheter mitral valve repair using the MitraClip device.  2. Left atrial catheterization  3. Trans-septal puncture  4. Venotomy closure.    PHYSICIANS:  1. Attending Interventional Cardiology Staff: Christiano Stephenson MD.  2. 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    DESCRIPTION:  1. Consent obtained with discussion of risks.  All questions were answered.  2. Sterile prep and procedure per OR protocol.  3. Location: right common femoral vein.  4. Access: Local anesthetic with lidocaine.  A standard (18 g) needle with ultrasound guidance was used to establish vascular access using a modified Seldinger technique.  5. Sheath:  24Fr MitraClip delivery system sheath in the RCFV  6. Catheters: SL-1.  7. Estimated blood loss of <5 mL.  8. See below for procedure details.    MEDICATIONS:  1. Contrast 0 mL IV.  2. Sedation per anesthesia.  3. Heparin administered to achieve a goal ACT > 300 sec per anesthesia.   4. Protamine IV.    SUMMARY:  1. Access was obtained in the right common femoral vein.  The venous site used for MitraClip delivery was pre-closed with two Perclose Proglide percutaneous suture devices.   2. Access to the left atrium was achieved using a standard transseptal puncture technique with fluoroscopic and transesophageal echocardiographic guidance using a BRK needle trans-septal guiding sheath and needle.  A pigtail wire was advanced into the left atrium and secured in place. Following trans-septal puncture intravenous heparin was administered to achieve and maintain an activated clotting time (ACT) of >300.   3. Pre-dilation of the  femoral access site was performed with ascending size dilators to allow for insertion of a 24 [place] steerable trans-venous sheath.  The delivery sheath was advanced across the inter-atrial septum without difficulty. A pigtail catheter was positioned in the left atrium for continuous LA pressure monitoring.  4. The MitraClip Clip Delivery System was advanced into the left atrium.  Under multiplane YOLANDA guidance, the MitraClip was oriented appropriately over the mitral valve. The clip was then opened and the arms were positioned perpendicularly to the leaflets using the en face 3D YOLANDA projection. Once properly oriented, the clip was advanced to the left ventricle, and the clip delivery system was then pulled back and the leaflets were grasped by dropping the grippers. After confirmation of adequate grasping of the leaflets, the arms were closed and reduction of mitral regurgitation was assessed and the possibility of iatrogenic mitral stenosis was evaluated. Once an adequate reduction in mitral regurgitation was confirmed with evaluation of gradients and direct echocardiographic visualization, the clip was successfully deployed. The clip was placed in the A2/P2 positions.  5. The delivery system and sheath were removed and optimal hemostasis was achieved with deployment of the Perclose sutures.   6. Mitral regurgitation was confirmed to be trace following the procedure and the mean gradient was 5mmHg, no evidence of significant valve stenosis.     NOTABLE EVENTS:  1. None    COMPLICATIONS:  1. None    SUMMARY:    1. Severe symptomatic mitral regurgitation secondary to mitral valve degenerative disease in a poor surgical candidate.  2. Successful transcatheter repair of the mitral valve with the MitraClip device using a total of 1 clip.  3. Mitral regurgitation severity improved to trace.  4. Right common femoral veinotomy closed with a suture closure device.    PLAN:    1. Aspirin 81 mg po daily lifelong x 3  months.  2. Restart apixaban 2.5mg BID indefinitely (for AFib).  3. Bedrest per protocol.  4. Admit to the primary inpatient team for further evaluation and management.  5. Echocardiogram tomorrow.     The attending interventional cardiologists were present and participated in the entire procedure.    REBECA Rosario MD, PhD  Structural Cardiology Fellow      I was present for the entire procedure.     Avila Watson M.D.  Interventional Cardiology  [facility]  Pager: [phone]

## 2019-02-11 NOTE — PLAN OF CARE
Pt arrived on 6C from PACU this afternoon after a mitralclip procedure this morning.  Groin was soft and tender, pulses palpable.  Pt A&Ox4, VSS.  Pt settled and is on bedrest until 1700.  Continue to monitor and contact CSI with concerns.

## 2019-02-11 NOTE — PLAN OF CARE
Pt VSS; SR to V paced with HR 70-90s; 2L NC with sats in the upper 90s. Denies pain. Constant, productive cough. Pt educated on holding R groin during forceful coughing. R groin site WNL; scant amount of blood under dressing unchanged since PACU. CMS intact; neuros q2hr. Gallagher patent. Up with SBA and a walker. Plan for YOLANDA tomorrow AM. Continue to monitor and notify team with changes.

## 2019-02-11 NOTE — PROGRESS NOTES
Clinic Care Coordination Contact      Patient was inpatient at [facility] from 2/7/19 to 2/8/19 with diagnosis of :     1. Moderate to severe mitral regurgitation  2. Chronic diastolic heart failure  3. Mild- moderate primary pulmonary hypertension  4. Atrial fibrillation  5. Tachy-lisa syndrome s/p PPM  6. 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 CKD   8. Hypertension    Patient had right heart cath. She has a dual chamber pacemaker.      Patient admitted to [facility] today 2/11/19 for MitraClip procedure  For mitral regurgitation. Remains inpatient.    Clinic care coordinator will follow up at time of discharge.     Cintia Alves RN, CCM - Care Coordinator     2/11/2019    10:28 AM  [phone]

## 2019-02-11 NOTE — TELEPHONE ENCOUNTER
Patient discharged from [facility] IP  ( Inpatient or ER).    Discharge location: [facility]  Discharge date: 2/8/19  Diagnosis: ATRIAL FLUTTER, PAROXYSMAL (H)  Patient has been in the ER/IP 1/3 times.  Care Coord:  POTENTIAL   1/28/19  Please follow up as appropriate. If no follow up required, please close encounter.

## 2019-02-12 ENCOUNTER — APPOINTMENT (OUTPATIENT)
Dept: CARDIOLOGY | Facility: CLINIC | Age: 84
DRG: 229 | End: 2019-02-12
Payer: MEDICARE

## 2019-02-12 ENCOUNTER — APPOINTMENT (OUTPATIENT)
Dept: GENERAL RADIOLOGY | Facility: CLINIC | Age: 84
DRG: 229 | End: 2019-02-12
Payer: MEDICARE

## 2019-02-12 ENCOUNTER — APPOINTMENT (OUTPATIENT)
Dept: OCCUPATIONAL THERAPY | Facility: CLINIC | Age: 84
DRG: 229 | End: 2019-02-12
Payer: MEDICARE

## 2019-02-12 VITALS
HEART RATE: 75 BPM | BODY MASS INDEX: 27.33 KG/M2 | SYSTOLIC BLOOD PRESSURE: 129 MMHG | HEIGHT: 64 IN | TEMPERATURE: 98.3 F | WEIGHT: 160.05 LBS | RESPIRATION RATE: 18 BRPM | DIASTOLIC BLOOD PRESSURE: 64 MMHG | OXYGEN SATURATION: 100 %

## 2019-02-12 DIAGNOSIS — I34.0 MITRAL VALVE INSUFFICIENCY, UNSPECIFIED ETIOLOGY: Primary | ICD-10-CM

## 2019-02-12 LAB
ANION GAP SERPL CALCULATED.3IONS-SCNC: 10 MMOL/L (ref 3–14)
BUN SERPL-MCNC: 53 MG/DL (ref 7–30)
CALCIUM SERPL-MCNC: 7.9 MG/DL (ref 8.5–10.1)
CHLORIDE SERPL-SCNC: 105 MMOL/L (ref 94–109)
CO2 SERPL-SCNC: 25 MMOL/L (ref 20–32)
CREAT SERPL-MCNC: 1.76 MG/DL (ref 0.52–1.04)
ERYTHROCYTE [DISTWIDTH] IN BLOOD BY AUTOMATED COUNT: 13.2 % (ref 10–15)
GFR SERPL CREATININE-BSD FRML MDRD: 25 ML/MIN/{1.73_M2}
GLUCOSE SERPL-MCNC: 118 MG/DL (ref 70–99)
HCT VFR BLD AUTO: 29.3 % (ref 35–47)
HGB BLD-MCNC: 9.8 G/DL (ref 11.7–15.7)
INTERPRETATION ECG - MUSE: NORMAL
INTERPRETATION ECG - MUSE: NORMAL
KCT BLD-ACNC: 237 SEC (ref 75–150)
KCT BLD-ACNC: 245 SEC (ref 75–150)
MAGNESIUM SERPL-MCNC: 2.1 MG/DL (ref 1.6–2.3)
MCH RBC QN AUTO: 33.9 PG (ref 26.5–33)
MCHC RBC AUTO-ENTMCNC: 33.4 G/DL (ref 31.5–36.5)
MCV RBC AUTO: 101 FL (ref 78–100)
PHOSPHATE SERPL-MCNC: 4.4 MG/DL (ref 2.5–4.5)
PLATELET # BLD AUTO: 260 10E9/L (ref 150–450)
POTASSIUM SERPL-SCNC: 4.1 MMOL/L (ref 3.4–5.3)
RBC # BLD AUTO: 2.89 10E12/L (ref 3.8–5.2)
SODIUM SERPL-SCNC: 140 MMOL/L (ref 133–144)
WBC # BLD AUTO: 11.3 10E9/L (ref 4–11)

## 2019-02-12 PROCEDURE — 93308 TTE F-UP OR LMTD: CPT | Mod: 26 | Performed by: INTERNAL MEDICINE

## 2019-02-12 PROCEDURE — 93325 DOPPLER ECHO COLOR FLOW MAPG: CPT | Mod: 26 | Performed by: INTERNAL MEDICINE

## 2019-02-12 PROCEDURE — 93010 ELECTROCARDIOGRAM REPORT: CPT | Performed by: INTERNAL MEDICINE

## 2019-02-12 PROCEDURE — 93005 ELECTROCARDIOGRAM TRACING: CPT

## 2019-02-12 PROCEDURE — 85027 COMPLETE CBC AUTOMATED: CPT

## 2019-02-12 PROCEDURE — A9270 NON-COVERED ITEM OR SERVICE: HCPCS | Mod: GY | Performed by: PHYSICIAN ASSISTANT

## 2019-02-12 PROCEDURE — 25000132 ZZH RX MED GY IP 250 OP 250 PS 637: Mod: GY

## 2019-02-12 PROCEDURE — 36415 COLL VENOUS BLD VENIPUNCTURE: CPT

## 2019-02-12 PROCEDURE — 93308 TTE F-UP OR LMTD: CPT

## 2019-02-12 PROCEDURE — 25000132 ZZH RX MED GY IP 250 OP 250 PS 637: Mod: GY | Performed by: PHYSICIAN ASSISTANT

## 2019-02-12 PROCEDURE — 93321 DOPPLER ECHO F-UP/LMTD STD: CPT | Mod: 26 | Performed by: INTERNAL MEDICINE

## 2019-02-12 PROCEDURE — 71045 X-RAY EXAM CHEST 1 VIEW: CPT

## 2019-02-12 PROCEDURE — 99238 HOSP IP/OBS DSCHRG MGMT 30/<: CPT | Mod: 25 | Performed by: PHYSICIAN ASSISTANT

## 2019-02-12 PROCEDURE — 97110 THERAPEUTIC EXERCISES: CPT | Mod: GO

## 2019-02-12 PROCEDURE — 83735 ASSAY OF MAGNESIUM: CPT

## 2019-02-12 PROCEDURE — 80048 BASIC METABOLIC PNL TOTAL CA: CPT

## 2019-02-12 PROCEDURE — 97535 SELF CARE MNGMENT TRAINING: CPT | Mod: GO

## 2019-02-12 PROCEDURE — A9270 NON-COVERED ITEM OR SERVICE: HCPCS | Mod: GY

## 2019-02-12 PROCEDURE — 97165 OT EVAL LOW COMPLEX 30 MIN: CPT | Mod: GO

## 2019-02-12 PROCEDURE — 84100 ASSAY OF PHOSPHORUS: CPT

## 2019-02-12 RX ORDER — NALOXONE HYDROCHLORIDE 0.4 MG/ML
.1-.4 INJECTION, SOLUTION INTRAMUSCULAR; INTRAVENOUS; SUBCUTANEOUS
Status: DISCONTINUED | OUTPATIENT
Start: 2019-02-12 | End: 2019-02-12 | Stop reason: HOSPADM

## 2019-02-12 RX ORDER — ISOSORBIDE MONONITRATE 60 MG/1
60 TABLET, EXTENDED RELEASE ORAL DAILY
Status: DISCONTINUED | OUTPATIENT
Start: 2019-02-12 | End: 2019-02-12 | Stop reason: HOSPADM

## 2019-02-12 RX ADMIN — ISOSORBIDE MONONITRATE 60 MG: 60 TABLET, EXTENDED RELEASE ORAL at 09:00

## 2019-02-12 RX ADMIN — CARVEDILOL 3.12 MG: 3.12 TABLET, FILM COATED ORAL at 09:01

## 2019-02-12 RX ADMIN — LEVOTHYROXINE SODIUM 75 MCG: 75 TABLET ORAL at 09:01

## 2019-02-12 RX ADMIN — FUROSEMIDE 20 MG: 20 TABLET ORAL at 08:59

## 2019-02-12 RX ADMIN — HYDRALAZINE HYDROCHLORIDE 10 MG: 10 TABLET, FILM COATED ORAL at 14:11

## 2019-02-12 RX ADMIN — ASPIRIN 81 MG: 81 TABLET, COATED ORAL at 09:00

## 2019-02-12 RX ADMIN — HYDRALAZINE HYDROCHLORIDE 10 MG: 10 TABLET, FILM COATED ORAL at 09:01

## 2019-02-12 RX ADMIN — APIXABAN 2.5 MG: 2.5 TABLET, FILM COATED ORAL at 09:01

## 2019-02-12 ASSESSMENT — ACTIVITIES OF DAILY LIVING (ADL)
ADLS_ACUITY_SCORE: 19

## 2019-02-12 NOTE — PROGRESS NOTES
02/12/19 1400   Quick Adds   Type of Visit Initial Occupational Therapy Evaluation   Living Environment   Lives With alone   Living Arrangements independent living facility   Home Accessibility no concerns   Transportation Anticipated family or friend will provide   Self-Care   Usual Activity Tolerance moderate   Current Activity Tolerance fair   Regular Exercise No   Functional Level   Ambulation 1-->assistive equipment  (Walker)   Transferring 1-->assistive equipment   Toileting 1-->assistive equipment   Bathing 3-->assistive equipment and person   Dressing 0-->independent   Eating 0-->independent   Communication 0-->understands/communicates without difficulty   Swallowing 0-->swallows foods/liquids without difficulty   Cognition 0 - no cognition issues reported   Fall history within last six months no   Number of times patient has fallen within last six months 0   Which of the above functional risks had a recent onset or change? ambulation;transferring;toileting;bathing;dressing   Prior Functional Level Comment Pt was ambulating with FWW and a 4WW for longer distances. Pt uses a shower chair and grab bars in shower.    General Information   Onset of Illness/Injury or Date of Surgery - Date 02/11/19   Referring Physician Kelby Rosario MD   Patient/Family Goals Statement Pt would like to return home independently.   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   Precautions/Limitations fall precautions;other (see comments)   Weight-Bearing Status - LUE (10# )   Weight-Bearing Status - RUE (10#)   Weight-Bearing Status - LLE full weight-bearing   Weight-Bearing Status - RLE full weight-bearing   Cognitive Status Examination  "  Orientation orientation to person, place and time   Level of Consciousness alert   Follows Commands (Cognition) WNL   Visual Perception   Visual Perception Wears glasses   Visual Perception Comments Pt has macular degeneration in both eyes, Wet in left and dry MD in right.    Sensory Examination   Sensory Quick Adds No deficits were identified   Range of Motion (ROM)   ROM Quick Adds No deficits were identified   ROM Comment BUE AROM WFL   Strength   Strength Comments Pt demonstrates a mild deficit in BUE  strength.    Mobility   Bed Mobility Comments SBA and vc's.    Transfer Skills   Transfer Comments SBA/CGA and vc's with FWW.    Clinical Impression   Criteria for Skilled Therapeutic Interventions Met yes, treatment indicated   OT Diagnosis Decreased tolerance for functional transfers/ADLs.    Influenced by the following impairments Decreased strength/endurance, fatigue, Decreased functional mobility   Assessment of Occupational Performance 1-3 Performance Deficits   Identified Performance Deficits Decreased functional mobility, decreased activity tolerance/strength.    Clinical Decision Making (Complexity) Low complexity   Therapy Frequency daily   Predicted Duration of Therapy Intervention (days/wks) 2/19/2019   Anticipated Discharge Disposition Home with Home Therapy   Risks and Benefits of Treatment have been explained. Yes   Patient, Family & other staff in agreement with plan of care Yes   [facility] AM-PAC  \"6 Clicks\" Daily Activity Inpatient Short Form   1. Putting on and taking off regular lower body clothing? 3 - A Little   2. Bathing (including washing, rinsing, drying)? 3 - A Little   3. Toileting, which includes using toilet, bedpan or urinal? 4 - None   4. Putting on and taking off regular upper body clothing? 4 - None   5. Taking care of personal grooming such as brushing teeth? 4 - None   6. Eating meals? 4 - None   Daily Activity Raw Score (Score out of 24.Lower scores equate to " lower levels of function) 22   Total Evaluation Time   Total Evaluation Time (Minutes) 10

## 2019-02-12 NOTE — PLAN OF CARE
VSS, A&Ox4, Paced w/intermittent afib in 60s-80s. CMS intact, groin site c/d/i w/old drainage under dressing. Q2hr neuros intact. Weaned off O2, sats mid 90s. Gallagher d/c'd at 0900 - due to void by 1300. Pt c/o improving abd pain, no interventions per pt. Up w/SBA and walker, good appetite, 1.5L FR, last BM 2/10 - prune juice ordered for lunch. Plan to continue monitoring groin site, Q2hr neuros, CMS and vitals Q4hr. Possible discharge this afternoon pending ride set-up. Notify CSI of changes or concerns.     Keegan Zafar RN  Hours cared for: [phone]

## 2019-02-12 NOTE — DISCHARGE INSTRUCTIONS
Discharging RN: Please fax orders to [facility]: Fax # [phone]

## 2019-02-12 NOTE — PROGRESS NOTES
Care Coordinator - Discharge Planning    Admission Date/Time:  2/11/2019  Attending MD:  Avila Watson MD     Data  Date of initial CC assessment: 2/12/19  Chart reviewed, discussed with interdisciplinary team.   Patient was admitted for:   1. Heart failure with preserved ejection fraction (H)    2. Mitral valve insufficiency, unspecified etiology         Assessment   Concerns with insurance coverage for discharge needs: None.  Current Living Situation: Patient lives in an assisted living facility[facility]  Support System: Supportive and Involved  Services Involved: Assisted Living and Home Care  Transportation at Discharge: Car and Family or friend will provide  Transportation to Medical Appointments:    - Name of caregiver: Son, osvaldo or other children  Barriers to Discharge: pending medical stability      Coordination of Care and Referrals:   Met with patient and son at bedside. They report that she lives at [facility]. She has been getting services from [facility] (RN/OT/PT). PT worked with her today and stated she would benefit from ongoing therapy. Orders for HC placed and faxed to agency.      Plan  Anticipated Discharge Date:  Likely yet today  Anticipated Discharge Plan:  Return to [facility] with existing services    CTS Handoff completed:  YES    John Linda RN Care Coordinator

## 2019-02-12 NOTE — DISCHARGE SUMMARY
[address] [address]  p: [phone]    Discharge Summary: Cardiology Service    Linda Spicer MRN# [id number]   YOB: 1931 Age: 87 year old       Admission Date: 2/11/2019  Discharge Date: 02/12/19      Discharge Diagnoses:  1. Severe symptomatic MR status post mitraclip  2. HFpEF   3. Restrictive cardiomyopathy   4. IPF     Pertinent Procedures:  Makayla clip     Consults:  [facility] IP     Imaging with results:    TTE 2/12/19 (post makayla-clip)   Normal biventricular function.  Post MitraClip placement. Clip is well seated with trace mitral regurgitation,  mean gradient is 5 mmHg.  The inferior vena cava was normal in size with preserved respiratory  variability.  No pericardial effusion.    Echocardiogram 2/8/19:  Interpretation Summary  Normal left and right ventricular size and function.  Mitral insufficiency appears mild in severity.  Pulmonary artery systolic pressure is normal.  The inferior vena cava was normal in size with preserved respiratory  variability.  Estimated mean right atrial pressure is 3 mmHg (normal).     Compared to the last transthoracic study on 1/16/19 the MR looks essentially  Unchanged.     TTE 1/16/19  Global and regional left ventricular function is normal with an EF of 60-65%.  Right ventricular function, chamber size, wall motion, and thickness are  Normal. The inferior vena cava is normal. No pericardial effusion is present.     Transesophageal echocardiogram 10/16/18  The left atrial appendage is normal. It is free of spontaneous echo contrast and thrombus.  No transgastric images obtained due to patient discomfort.     The Ejection Fraction is estimated at 55-60%.  Global right ventricular function is normal.  Moderate to severe mitral insufficiency is present.  Moderate tricuspid insufficiency is present.  Multiple MR jets noted. There is blunted systolic forward flow in 2 of the  pulmonary  veins. MR volume is estimated at 34ml, however this likely  underestimates MR due to multiple jets.     Compared to prior TTE on 10/9/2018, MR appears to be worse.      Coronary Angiogram 1/31/19:  RHC  BSA 1.83  1. HR 67 bpm  2. /83/118 mmHg  3. RA 7/8/6   4. RV 46/5  5. PA 46/18/32   6. PCW 16/16/13   7. PA sat 61%   8. PCW sat --%  9. Hgb 12 g/dL   10. Adrienne CO 2.5   11. Adrienne CI 1.4   12. TD CO 2.7   13. TD CI 1.4   14. PVR 7.8      LHC  1. LVEDP 15 mmHg.  2. No gradient across the aortic valve on pull back.  3. 1.0 liter of normal saline was given prior to performing simultaneous LV/RV pressures to evaluate for restrictive/constrictive physiology given markedly reduced cardiac index of 1.4 by Adrienne and TD. There was equalization of LV and RV end diastolic pressure at 15 mmHg after the fluid challenge with concordant respiratory variability suggestive of restrictive physiology.        Other imaging studies:  CXR   IMPRESSION:   1. Postprocedural changes from mitraclip.  2. Unchanged bibasilar interstitial lung disease.    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 She is admitted for a planned makayla-clip for Monday.     Leading up to her admission, Mrs. Spicer endorses NYHA class III -IV symptoms and notably has had several admission to cardiology for HFpEF exacerbation and shortness of breath. She did well after Makayla-clip procedure. Suspect her admission for heart failure exacerbations were in part due to her labile hypertension and MR causing her to have shortness of breath. Her BP here was monitored and was normal. She was feeling quite well. She  was agreeable with the plan to discharge with plan for follow up with our valve team as well as our heart failure team regarding her HFpEF and restrictive cardiomyopathy.         Hospital Course by Diagnosis:  # Moderate-severe mitral regurgitation with recurrent admissions  # HFpEF (EF 55-60%) with restrictive physiology on recent cath  # Mild-moderate primary pulmonary hypertension likely from PF rom RA  Admitted with worsening RUSSELL although patient does have known mod-severe MR that appears stable on multiple TTE's in the last year. Patient with several recent admissions for tachypnea, shortness of breath; consider that patient's HTN contributing.  Unlikely afib as etiology. Repeat echocardiogram(s) unchanged with normal IVC, normal LV EF, RA 3 mmhg. RHC/LHC (full report below) with equalization of LV and RV end diastolic pressure at 15 mmHg after the fluid challenge with concordant respiratory variability suggestive of restrictive physiology. Her most recent admission 2/7-2/8 for consideration of inpatient mireille-clip after she was seen in clinic and was notably tachypneic and short of breath. She was brought in for medical optimization and inpatient mitral clip however patient appeared well compensated on exam, felt she was not in acute exacerbation of CHF from MR, and she was discharged. She returns today for planned mireille-clip procedure. She is now status post mireille-clip performed without complication. The clip was successfully deployed in the A2/P2 position and MR was trace following clip with a gradient of 5 mmHg, no significant valve stenosis. Follow up TTE revealed showed a well seated clip with normal mitral valve gradient.      -DAPT with ASA 81 mg daily and apixaban for 3 months and then stop aspirin  -Salt restrict, fluid restrict, continue Lasix 40 mg AM/20 mg PM   - Continue PTA Coreg 3.125mg BID, Lasix 20 mg BID, Hydralazine 25 mg TID, Imdur 60 mg daily   -If patient continues to have uncontrolled  hypertension, consider increasing the Coreg to 6.25 mg BID   -Cadiac rehab   -Follow up with our valve team as arranged   -Follow up with our heart failure specialists/CORE as arranged      # Atrial fibrillation   # Tachy-lisa syndrome s/p dual chamber pacemaker  Rate controlled, on apixaban at home; held 2/9 in preparation for MitraClip procedure Monday and resumed without bleeding complication.   -Continue Coreg 3.125 mg BID   -Continue apixaban      Chronic Conditions:  # Pulmonary fibrosis  # Rheumatoid arthritis   # CKD baseline Cr 1.5-1.8  # HTN        Condition on discharge  Temp:  [98.1  F (36.7  C)-98.8  F (37.1  C)] 98.2  F (36.8  C)  Pulse:  [70-75] 75  Heart Rate:  [69-97] 71  Resp:  [18-20] 18  BP: (111-132)/(53-83) 118/53  SpO2:  [94 %-100 %] 98 %  General: Alert, interactive, NAD  Eyes: sclera anicteric, EOMI  Neck: JVP flat, carotid 2+ bilaterally  Cardiovascular: paced, normal S1 and S2, soft BRIANA, no gallops, or rubs. Groin access site soft, no hematoma or bruit.   Resp: clear to auscultation bilaterally, no rales, wheezes, or rhonchi  GI: Soft, nontender, nondistended. +BS.  No HSM or masses, no rebound or guarding.  Extremities: no edema, no cyanosis or clubbing, dorsalis pedis and posterior tibialis pulses 2+ bilaterally  Skin: Warm and dry, no jaundice or rash  Neuro: CN 2-12 intact, moves all extremities equally  Psych: Alert & oriented x 3      Medication Changes:  START   Aspirin 81 mg daily in addition to the apixaban     Discharge medications:   Current Discharge Medication List      CONTINUE these medications which have CHANGED    Details   aspirin (ASA) 81 MG EC tablet Take 1 tablet (81 mg) by mouth daily  Qty: 90 tablet, Refills: 0    Associated Diagnoses: Heart failure with preserved ejection fraction (H)         CONTINUE these medications which have NOT CHANGED    Details   abatacept (ORENCIA) 250 MG injection Inject 750 mg into the vein every 28 days  Qty: 30 mL, Refills: 0     Associated Diagnoses: Rheumatoid arthritis involving multiple sites with positive rheumatoid factor (H)      azaTHIOprine (IMURAN) 50 MG tablet Take 1 tablet (50 mg) by mouth daily  Qty: 30 tablet, Refills: 0    Associated Diagnoses: Rheumatoid arthritis involving multiple sites with positive rheumatoid factor (H)      calcium carbonate-vitamin D (OS-FATOUMATA) 500-400 MG-UNIT tablet Take 1 tablet by mouth daily  Qty: 30 tablet, Refills: 0    Associated Diagnoses: Rheumatoid arthritis involving multiple sites with positive rheumatoid factor (H)      Carboxymethylcellulose Sod PF (CELLUVISC/REFRESH LIQUIGEL) 1 % ophthalmic gel Place 1 drop into both eyes daily as needed for dry eyes  Qty: 1 Bottle    Associated Diagnoses: Dry eyes      carvedilol (COREG) 3.125 MG tablet Take 1 tablet (3.125 mg) by mouth 2 times daily (with meals)  Qty: 60 tablet, Refills: 0    Associated Diagnoses: Heart failure with preserved ejection fraction, NYHA class I (H); Benign essential hypertension      cetirizine (ZYRTEC ALLERGY) 10 MG tablet Take 1 tablet (10 mg) by mouth At Bedtime  Qty: 30 tablet, Refills: 0    Associated Diagnoses: Seasonal allergic rhinitis due to other allergic trigger      fish oil-omega-3 fatty acids 1000 MG capsule Take 1 capsule (1 g) by mouth 2 times daily    Associated Diagnoses: Hyperlipidemia LDL goal <100      folic acid (FOLVITE) 1 MG tablet Take 1 tablet (1 mg) by mouth daily  Qty: 30 tablet, Refills: 0    Associated Diagnoses: Oral aphthae      furosemide (LASIX) 20 MG tablet Take 1 tablet (20 mg) by mouth 2 times daily Hold Lasix 12/18 and 12/19, then start 20 mg daily after that.  Qty: 60 tablet, Refills: 0    Associated Diagnoses: Heart failure with preserved ejection fraction (H)      hydrALAZINE (APRESOLINE) 10 MG tablet Take 10 mg by mouth 3 times daily      isosorbide mononitrate (IMDUR) 60 MG 24 hr tablet Take 1 tablet (60 mg) by mouth daily  Qty: 30 tablet, Refills: 0    Associated Diagnoses:  Hypertension goal BP (blood pressure) < 150/90      levothyroxine (SYNTHROID/LEVOTHROID) 75 MCG tablet Take 1 tablet (75 mcg) by mouth daily  Qty: 30 tablet, Refills: 0    Associated Diagnoses: Hypothyroidism, unspecified type      Multiple Vitamins-Minerals (PRESERVISION AREDS) CAPS Take 1 capsule by mouth 2 times daily  Qty: 30 capsule    Associated Diagnoses: (HFpEF) heart failure with preserved ejection fraction (H)      ranitidine (ZANTAC) 150 MG tablet Take 1 tablet (150 mg) by mouth 2 times daily  Qty: 60 tablet, Refills: 0    Associated Diagnoses: Gastroesophageal reflux disease without esophagitis      trimethoprim (TRIMPEX) 100 MG tablet Take 0.5 tablets (50 mg) by mouth daily  Qty: 15 tablet, Refills: 0    Comments: Hold while taking Omnicef for UTI for 7 days, then resume the day after  Associated Diagnoses: Recurrent UTI      vitamin C (ASCORBIC ACID) 500 MG tablet Take 1 tablet (500 mg) by mouth daily  Qty: 30 tablet, Refills: 0    Associated Diagnoses: Vitamin deficiency      acetaminophen (TYLENOL) 500 MG tablet Take 1 tablet (500 mg) by mouth every 6 hours as needed for mild pain or fever    Associated Diagnoses: Rheumatoid arthritis involving multiple sites with positive rheumatoid factor (H)      albuterol (PROVENTIL) (2.5 MG/3ML) 0.083% neb solution Take 1 vial (2.5 mg) by nebulization every 6 hours as needed for shortness of breath / dyspnea or wheezing  Qty: 360 mL    Associated Diagnoses: ILD (interstitial lung disease) (H)      apixaban ANTICOAGULANT (ELIQUIS) 2.5 MG tablet Take 1 tablet (2.5 mg) by mouth 2 times daily Stop taking 2/9 (last dose evening of Feb 8, 2019) in preparation for MitraClip Procedure on Monday  Qty: 60 tablet, Refills: 0    Associated Diagnoses: Atrial flutter, paroxysmal (H)      denosumab (PROLIA) 60 MG/ML SOLN injection Inject 1 mL (60 mg) Subcutaneous every 6 months  Qty: 1 mL    Comments: Reportedly last given on 11/26/18. Due for next dose on 5/26/18. Please  discuss with endocrinology or primary care prior to administering this dose.  Associated Diagnoses: Age-related osteoporosis without current pathological fracture      diphenhydrAMINE (BENADRYL) 25 MG tablet Take 25 mg by mouth as needed Patient takes 25-50mg 1-2 times a day.      nitroGLYcerin (NITROSTAT) 0.4 MG sublingual tablet Place 1 tablet (0.4 mg) under the tongue every 5 minutes as needed for chest pain  Qty: 25 tablet, Refills: 11    Associated Diagnoses: Acute chest pain      !! order for DME Dispense SP Walker-small  Qty: 1 each, Refills: 0    Associated Diagnoses: Pain of toe of right foot; Status post bunionectomy      !! order for DME Equipment being ordered: Dispense baffle, for use with nebulizer.  Qty: 1 each, Refills: 0    Associated Diagnoses: Pneumonia of left upper lobe due to Mycoplasma pneumoniae      !! order for DME Equipment being ordered: Nebulizer. Use with Albuterol.  Qty: 1 each, Refills: 0    Associated Diagnoses: Hypoxia      !! order for DME Equipment being ordered: Dispense face mask.  Mrs. Spicer is immunosuppressed due to rheumatoid arthritis.  Qty: 1 Box, Refills: 11    Associated Diagnoses: Rheumatoid arthritis involving left wrist with positive rheumatoid factor (H)      ranibizumab (LUCENTIS) 0.3 MG/0.05ML SOLN 0.05 mLs (0.3 mg) by Intravitreal route See Admin Instructions Every 6 weeks    Associated Diagnoses: Macular degeneration of left eye, unspecified type      saccharomyces boulardii (FLORASTOR) 250 MG capsule Take 1 capsule (250 mg) by mouth daily  Qty: 30 capsule, Refills: 0    Associated Diagnoses: (HFpEF) heart failure with preserved ejection fraction (H)      senna-docusate (SENOKOT-S/PERICOLACE) 8.6-50 MG tablet Take 1 tablet by mouth daily as needed for constipation  Qty: 100 tablet    Associated Diagnoses: Irritable bowel syndrome, unspecified type       !! - Potential duplicate medications found. Please discuss with provider.          Labs or imaging requiring  follow-up after discharge:  CBC, BMP   BP recheck       Follow-up:  Follow up with PCP in next 5-7 days for a hospital follow up and recheck BP   Follow up with our valve team as arranged  Follow up with our heart failure specialist team (CORE) as arranged    Code status:  DNR    Pt was seen by, and discharge plan discussed with Dr. Walter MOCTEZUMA   Cardiology  Pager [phone]    Patient Care Team:  Tre Lockett MD as PCP - General (Internal Medicine)  Tre Lockett MD as PCP - Assigned PCP  Joey Ovalle MD as MD (Family Medicine - Sports Medicine)  Behl, Melissa K, RN as Clinic Care Coordinator (Primary Care - CC)  Lizett Zapata, RN as Registered Nurse (Cardiology)  Priscilla Agrawal PA-C as Physician Assistant (Cardiology)  Karla Mabry NP as Nurse Practitioner (Cardiology)  Amna Casey, BRANDI as Nurse Coordinator (Cardiology)  Franki Carrasquillo MD as MD (Clinical Cardiac Electrophysiology)  Karol Perez, BRANDI as Nurse Coordinator (Clinical Cardiac Electrophysiology)  Minna Gallagher APRN CNP as Nurse Practitioner (Clinical Cardiac Electrophysiology)      I have seen and examined the patient and agree with the finding and plan.       Avila Watson MD  Cardiology-[facility]  [phone]

## 2019-02-12 NOTE — PLAN OF CARE
D: s/p mitraclip yesterday.  I & A: Vital signs stable, Neuro checks every 2 hours and normal. Right groin site unchanged and soft with CMS intact and pulses normal. Patient alert and oriented. Capnography monitoring. Gallagher with adequate urine output. Patient denies pain.   P: Continue monitoring per orders. Answer patient questions and report changes or concerns to CSI.

## 2019-02-12 NOTE — PROGRESS NOTES
DISCHARGE                         2/12/2019 @ 1710  ----------------------------------------------------------------------------  Discharged to: Home  Via: Automobile  Accompanied by: Family  Discharge Instructions: diet, activity, medications, follow up appointments, when to call the MD, aftercare instructions, and what to watchout for (i.e. s/s of infection, increasing SOB, palpitations, chest pain,)  Prescriptions: To be filled by  our pharmacy per pt's request; medication list reviewed & sent with pt  Follow Up Appointments: arranged; information given  Belongings: All sent with pt  IV: out  Telemetry: off  Pt exhibits understanding of above discharge instructions; all questions answered.    Discharge Paperwork: Signed, copied, and sent home with patient.

## 2019-02-12 NOTE — PLAN OF CARE
D: Pt s/p mitraclip 2/11.   I: Monitored vitals and assessed pt status. Neuro checks q2h. Doppler pulses q4h. Gallagher catheter. Capnography monitoring.  A: A0x4. VSS on 2LPM via NC. Paced with intermittent afib on tele. Afebrile. Neuros intact. Denies pain, SOB, dizziness, nausea. R groin site unchanged, soft, tender. CMS intact and pulses WNL. Tolerating 2g Na diet. Pt stood at bedside x1 with walker and SBA, tolerated well. Gallagher patent, uop adequate. Pt appeared to rest comfortably between cares, making needs known.  P: Continue to monitor and report changes/concerns to CSI.

## 2019-02-12 NOTE — PLAN OF CARE
Discharge Planner OT   Patient plan for discharge: Pt would like to return home with Home therapies.   Current status: Evaluation completed and treatment initiated. Therapist educated pt on OT/CR role and discussed POC/Goals. Educated pt on post surgical groin precautions. Pt required SBA and vc's for transfer supine<->seated EOB and sit<->standing with FWW. Pt ambulated to bathroom with CGA and FWW. Pt ambulated ~260ft x2 with CGA, vc's and FWW. Pt tolerated session well. VSS.   Barriers to return to prior living situation: Decreased strength/endurance, Fatigue, Post surgical precautions.   Recommendations for discharge: Anticipate discharge Home with A and HH Therapies.   Rationale for recommendations: Pt will benefit from continued therapy to address barriers above and to maximize functional independence.        Entered by: Mario Carias 02/12/2019 3:58 PM

## 2019-02-13 ENCOUNTER — DOCUMENTATION ONLY (OUTPATIENT)
Dept: OTHER | Facility: CLINIC | Age: 84
End: 2019-02-13

## 2019-02-13 ENCOUNTER — PATIENT OUTREACH (OUTPATIENT)
Dept: CARE COORDINATION | Facility: CLINIC | Age: 84
End: 2019-02-13

## 2019-02-13 ASSESSMENT — ACTIVITIES OF DAILY LIVING (ADL): DEPENDENT_IADLS:: COOKING;CLEANING;LAUNDRY;SHOPPING;MEDICATION MANAGEMENT;MONEY MANAGEMENT;TRANSPORTATION

## 2019-02-13 NOTE — PROGRESS NOTES
Clinic Care Coordination Contact  Care Coordination Communication    Referral Source: IP Report    Clinical Data: Patient was hospitalized at [facility] from 2/11/2019 to 2-12-19 with diagnosis of heart failure, mitral valve insufficiency.     Home Care Contact:              Home Care Agency: [facility]              Contact name () and phone number: Kaela Deysi -[phone].              Care Coordination contacted home care: No              Anticipated start of care date: Resumption of current care.    Patient Contact:               Introduced self and role of care coordination.               Discharge instructions were reviewed with patient/caregiver.               Do you have any questions about your medications? no              Follow up appointment is scheduled for not scheduled yet.              Provided 24 Hour Nurse Line and/or 24 Hour Appointment Scheduling: Yes              Home care has contacted patient: Yes              Patient questions/concerns: none    Plan: RN/SW Care Coordinator will await notification from home care staff informing RN/SW Care Coordinator of patients discharge plans/needs. RN/SW Care Coordinator will review chart and outreach to home care every 4 weeks and as needed.      Jim Livingston MSN, RN, PHN, CCM   Clinical RN Care Coordinator  [facility]   [email or web address]  Office: [phone]

## 2019-02-14 ENCOUNTER — MEDICAL CORRESPONDENCE (OUTPATIENT)
Dept: HEALTH INFORMATION MANAGEMENT | Facility: CLINIC | Age: 84
End: 2019-02-14

## 2019-02-14 ENCOUNTER — TELEPHONE (OUTPATIENT)
Dept: INTERNAL MEDICINE | Facility: CLINIC | Age: 84
End: 2019-02-14

## 2019-02-14 NOTE — PLAN OF CARE
Occupational Therapy and Cardiac Rehab Discharge Summary    Reason for therapy discharge:    Discharged to home with home therapy.    Progress towards therapy goal(s). See goals on Care Plan in [facility] electronic health record for goal details.  Goals partially met.  Barriers to achieving goals:   discharge on same date as initial evaluation.    Therapy recommendation(s):    Continued therapy is recommended.  Rationale/Recommendations:  Pt will benefit from discharge home with A and continued HH OT/PT to maximize independence with functional transfers, ADLs, and cardiovascular strength/endurance.

## 2019-02-14 NOTE — TELEPHONE ENCOUNTER
Notify home health nurse at number specified below that I agree to resumption of home care.  Mrs. Spicer should also take Ranitidine 150 mg BID.

## 2019-02-14 NOTE — TELEPHONE ENCOUNTER
Reason for Call:  Other Orders     Detailed comments: Needs orders to resume Skilled nursing and also clarification on ranitidine (ZANTAC) 150 MG tablet. Is she suppose to take this once or twice a day.     Phone Number Patient can be reached at: [phone]    Best Time: Any    Can we leave a detailed message on this number? YES    Call taken on 2/14/2019 at 3:29 PM by Melisa Dunn

## 2019-02-15 ENCOUNTER — OFFICE VISIT (OUTPATIENT)
Dept: UROLOGY | Facility: CLINIC | Age: 84
End: 2019-02-15
Payer: MEDICARE

## 2019-02-15 ENCOUNTER — CARE COORDINATION (OUTPATIENT)
Dept: CARDIOLOGY | Facility: CLINIC | Age: 84
End: 2019-02-15

## 2019-02-15 VITALS
HEART RATE: 72 BPM | OXYGEN SATURATION: 100 % | RESPIRATION RATE: 18 BRPM | DIASTOLIC BLOOD PRESSURE: 56 MMHG | SYSTOLIC BLOOD PRESSURE: 106 MMHG

## 2019-02-15 DIAGNOSIS — N39.0 RECURRENT UTI: Primary | ICD-10-CM

## 2019-02-15 PROCEDURE — 99213 OFFICE O/P EST LOW 20 MIN: CPT | Performed by: PHYSICIAN ASSISTANT

## 2019-02-15 RX ORDER — TRIMETHOPRIM 100 MG/1
50 TABLET ORAL DAILY
Qty: 45 TABLET | Refills: 0 | Status: SHIPPED | OUTPATIENT
Start: 2019-02-15 | End: 2019-09-23

## 2019-02-15 ASSESSMENT — PAIN SCALES - GENERAL: PAINLEVEL: NO PAIN (0)

## 2019-02-15 NOTE — PROGRESS NOTES
Referral- Heart Failure      [facility] Notes:       Sent to Becky to investigate, not sure if needs a HF doctor or CORE appt.     Per Zehra Butler patient needs a follow up appt with a CORE provider in one week. And a Appointment with a HF Cardiologist in 1 month to establish care.     Contact son (Arjun)  for scheduling [phone]    Made CORE appt for 2019 still need to make appt with HF doctor.     Per visit in CORE it was recommended that patient follow up with Dr. Loza in General Cardiology instead of a heart failure provider. When needed Dr. Loza will refer to Hear Failure.         DEMOGRAPHICS       Demographic Information on Linda Spicer:    Linda Spicer  Gender: female  : 1931  [address]    [facility] [address]  [phone] (home)     Medical Record: [id number]  Social Security Number: [id number]  Pharmacy:    [facility] [address] [place] [address] [address] [facility] - [place] [address] AT PORTAL TO [facility] SITES  Primary Care Provider: Tre Lockett    Parent's names are: Data Unavailable (mother) and Data Unavailable (father).    Insurance: Payor: MEDICARE / Plan: MEDICARE / Product Type: Medicare /

## 2019-02-15 NOTE — PROGRESS NOTES
UROLOGY OFFICE VISIT - FOLLOW UP    REASON FOR VISIT: follow up recurrent UTI    HPI: Ms. Linda Spicer is an 87 year old female who returns to the urology clinic for follow up of recurrent UTI's. Seen in initial consultation on 3/2/18 - please see consult note from this date for full history. In brief, she has had issues with frequent UTI's for a few years, but they have been worsening since November 2017. She reports having 3 infections from November to March 2018 (see below). Of note, she typically does not have any symptoms when she gets diagnosed with a UTI - she just requests urine samples to be checked when she is in clinic which then come back positive. She has never had a febrile UTI or been hospitalized for UTI's. She was started on daily low dose Cipro by her PCP in the Spring of 2017 which worked well to reduce her infections but caused a rash so she eventually stopped it. At an office visit on 3/2/18, her PVR was noted to be somewhat elevated to 202 mL. She was therefore encouraged to work on double voiding. Also started on vaginal estrogen cream and recommended to not check urine samples unless she has urinary symptoms concerning for a possible infection. Otherwise, this may just be asymptomatic bacteriuria which does not necessarily require treatment. At a follow up visit on 6/1/18, she was doing well with no further infections since starting vaginal estrogen cream. PVR was still elevated to 260 mL at that time so she was offered instruction in CIC but declined. She elected to continue double voiding. At another follow up appointment on 11/91/8, she reported 3 new UTI's in the preceding 2 months (all culture confirmed - see below). Symptoms with these infections included strong odor, hesitancy, and leakage. As a result, she was started on daily UTI prophylaxis with trimethoprim 50 mg. She returns today for follow up and is doing well overall from a urologic standpoint. She has been hospitalized  numerous times over the past few months for cardiopulmonary issues but is now back home and starting to regain her strength. She had 2 additional UTI's close together in November 2018 (may have been the same infection not entirely cleared - see below) but no infections since then. She is still using the vaginal estrogen cream twice weekly and taking 50 mg of trimethoprim once nightly.    REVIEW OF DIAGNOSTICS:   1/18/19 - UC: No growth  11/28/18 - UA: negative  11/23/18 - UC: No growth  11/17/18 - UC: 50-100K Enterococcus faecalis  11/9/18 - UC: 50-100K Enterococcus faecalis  10/30/18 - UC: >100K Klebsiella pneumoniae  10/11/18 - UC: >100K Citrobacter freundii complex  9/27/18 - UC: >100K Klebsiella pneumoniae  7/31/18 - UA: negative  7/26/18 - UA: negative  5/14/18 - UA: negative  2/13/18 - UA: negative --> UC: 10-50K coag neg Staph, >100K mixed  osvaldo  1/22/18 - UA: negative  1/15/18 - UA: negative  1/4/18 - UA: large blood, large LE, >100 WBC, 10-25 RBC, few bacteria --> UC: 10-50K mixed  osvaldo  12/4/17 -  UA: negative  11/27/17 - UA: trace LE, 0-2 WBC, 0-2 RBC, few bacteria  11/25/17 - UA: negative  11/21/17 - UA: small blood, large LE, >100 WBC, 2-5 RBC, many bacteria --> UC: >100K Citrobacter amalonaticus  8/30/17 - UA: negative  7/21/17 - UA: negative  6/15/17 - UA: negative  2/3/17 - UA: negative    PEx  /56 (BP Location: Left arm, Patient Position: Sitting, Cuff Size: Adult Regular)   Pulse 72   Resp 18   LMP  (LMP Unknown)   SpO2 100%   GEN: well-appearing female in NAD  RESP: no increased respiratory effort    PVR: 18 mL measured by ultrasound at last office visit    CT ABDOMEN PELVIS W/O CONTRAST, 11/17/2018   Bilateral renal cortical atrophy, not significantly changed. No  significant hydronephrosis or hydroureter. Small amount of gas within  the bladder. No significant bladder wall thickening. Mild prominent  fat of the left inguinal canal. Postoperative changes of hysterectomy.    US  RENAL COMPLETE WITH DOPPLER COMPLETE   1/16/2019   IMPRESSION: Normal renal ultrasound. Normal Doppler evaluation of the  kidneys.    A/P  88 yo female with recurrent UTI's vs. asymptomatic bacteriuria. Initially, she did well following initiation of vaginal estrogen therapy with no infections from March-September 2018. She then had 3 additional culture-confirmed infections in 2 months so was started on low dose prophylaxis with trimethoprim 50 mg once daily. She has done well with this with no further infections in recent months. We discussed management options of continuing with low dose trimethoprim versus stopping it, cranberry supplements, Hiprex, versus other. At this time, she elects to proceed with the following:  -Continue trimethoprim 50 mg at bedtime for UTI prophylaxis. Refill provided. Will plan to stop this after 3 months.  -Continue vaginal estrogen cream twice weekly.  -Continue double voiding.    -Cranberry supplement such as Ellura or Theracran daily.    Patient prefers to call or send a [employer] message with an update in 3 months. If infections persist, will likely proceed with cystoscopy to check for nidus for infections. Recent upper tract imaging was essentially unremarkable. Call or RTC sooner with any issues.     Asia Steven PA-C  Department of Urology

## 2019-02-15 NOTE — NURSING NOTE
Linda Spicer's goals for this visit include:   Chief Complaint   Patient presents with     Follow Up     for UTI. Pt states has not had one in a while.      She requests these members of her care team be copied on today's visit information:     PCP: Tre Lockett    Referring Provider:  No referring provider defined for this encounter.    /56 (BP Location: Left arm, Patient Position: Sitting, Cuff Size: Adult Regular)   Pulse 72   Resp 18   LMP  (LMP Unknown)   SpO2 100%     Do you need any medication refills at today's visit? No    Lucia Degroot LPN

## 2019-02-18 ENCOUNTER — CARE COORDINATION (OUTPATIENT)
Dept: CARDIOLOGY | Facility: CLINIC | Age: 84
End: 2019-02-18

## 2019-02-18 ENCOUNTER — ALLIED HEALTH/NURSE VISIT (OUTPATIENT)
Dept: PHARMACY | Facility: CLINIC | Age: 84
End: 2019-02-18
Payer: COMMERCIAL

## 2019-02-18 DIAGNOSIS — N39.0 RECURRENT UTI: ICD-10-CM

## 2019-02-18 DIAGNOSIS — I34.0 MITRAL VALVE INSUFFICIENCY, UNSPECIFIED ETIOLOGY: Primary | ICD-10-CM

## 2019-02-18 DIAGNOSIS — I48.0 PAROXYSMAL ATRIAL FIBRILLATION (H): ICD-10-CM

## 2019-02-18 DIAGNOSIS — E03.8 OTHER SPECIFIED HYPOTHYROIDISM: ICD-10-CM

## 2019-02-18 DIAGNOSIS — I50.30 (HFPEF) HEART FAILURE WITH PRESERVED EJECTION FRACTION (H): ICD-10-CM

## 2019-02-18 DIAGNOSIS — M05.79 RHEUMATOID ARTHRITIS INVOLVING MULTIPLE SITES WITH POSITIVE RHEUMATOID FACTOR (H): ICD-10-CM

## 2019-02-18 DIAGNOSIS — M81.0 AGE-RELATED OSTEOPOROSIS WITHOUT CURRENT PATHOLOGICAL FRACTURE: ICD-10-CM

## 2019-02-18 DIAGNOSIS — K21.9 GASTROESOPHAGEAL REFLUX DISEASE WITHOUT ESOPHAGITIS: ICD-10-CM

## 2019-02-18 DIAGNOSIS — J30.2 SEASONAL ALLERGIC RHINITIS, UNSPECIFIED TRIGGER: ICD-10-CM

## 2019-02-18 DIAGNOSIS — K59.09 OTHER CONSTIPATION: ICD-10-CM

## 2019-02-18 DIAGNOSIS — Z78.9 TAKES DIETARY SUPPLEMENTS: ICD-10-CM

## 2019-02-18 PROCEDURE — 99605 MTMS BY PHARM NP 15 MIN: CPT | Performed by: PHARMACIST

## 2019-02-18 PROCEDURE — 99607 MTMS BY PHARM ADDL 15 MIN: CPT | Performed by: PHARMACIST

## 2019-02-18 NOTE — PROGRESS NOTES
SUBJECTIVE/OBJECTIVE:                Linda Spicer is a 87 year old female called for a transitions of care visit.  She was discharged from [facility] on 2/12/19 for planned Makayla-clip procedure; previously was hospitalized 2/7-8 due to worsening SOB and the following  Discharge Diagnoses:  1. Moderate to severe mitral regurgitation  2. Chronic diastolic heart failure  3. Mild- moderate primary pulmonary hypertension  4. Atrial fibrillation  5. Tachy-lisa syndrome s/p PPM  6. 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 CKD   8. Hypertension.     Chief Complaint: Feeling much better since her procedure, shortness of breath has lessened and able to speak in full sentences, walk more easily.    Allergies/ADRs: Reviewed in Epic  Tobacco: No tobacco use   Alcohol: Less than 1 beverages / week  Caffeine: no caffeine  Activity: taking a short walk every couple hours in her building. Will be starting OT/PT this week  PMH: Reviewed in Epic    Medication Adherence/Access:  Patient uses pill box(es). If feels well, she will set up herself.  Otherwise will have her son or daughter help her.  Patient takes medications 4 time(s) per day.   Per patient, misses medication 0 times per week. Very rarely misses a dose when out of her regular routine (ex out for lunch)    GERD: Current medications include: Zantac (ranitidine) 150mg daily at night.  Pt c/o no current symptoms.  Patient feels that current regimen is effective. Wonders if she needs to take it BID as indicated on her med list.    constipation: taking senna S daily now since hospitalization.  Had difficulty for a couple days but stools now regular. Continues to take medication daily but planning to decrease back to PRN as tolerated.    RA: currently taking Orencia Q28 days, azathioprine 50mg QPM.  Denies any pain, hasn't needed to take apap in a long time.  Happy with medication regimen.   Followed by Dr Davenport rheumatology. Last visit 1/15/19.    HFpEF/A fib/mitral  regurgitation: Current medications include carvedilol 6.25mg twice daily, furosemide 20mg twice daily in AM and 2pm, Eliquis 2.5mg BID, aspirin 81mg daily, isosorbide 60mg daily, hydralazine 10mg TID.  Patient reports no current medication side effects.  No dizziness, no sx bleeding.   ECHO:  Date 2/11/19, EF 60-65%  Pt is not complaining of sx of HF.  She does report swelling in the leg her catheter was placed for MitraClip procedure. She is planning to call [facility] about this today, as well as weight concerns - see below.   SOB improved. No angina.   Pt is measuring daily weights and reports increased 1lb over past 7 days and 4lb since prior to hospitalization.   She closely monitors her fluid intake but feels like she wants to have less restriction in fluids due to difficulty urinating at times.   Patient does self-monitor BP. Home BP monitoring in range of 100-120's systolic over 60's diastolic. Two elevated readings 140-160s systolic.   Pt is following a low sodium diet, is avoiding EtOH.  Followed by cardiology.      Per discharge instructions:  DAPT with ASA 81 mg daily and apixaban for 3 months and then stop aspirin  -Salt restrict, fluid restrict, continue Lasix 40 mg AM/20 mg PM   - Continue PTA Coreg 3.125mg BID, Lasix 20 mg BID, Hydralazine 25 mg TID, Imdur 60 mg daily   -If patient continues to have uncontrolled hypertension, consider increasing the Coreg to 6.25 mg BID   -Cadiac rehab   -Follow up with our valve team as arranged   -Follow up with our heart failure specialists/[facility] as arranged      Recurrent UTI: currently taking trimethoprim 50mg daily without problems. Continues to be concerned about development of UTI because her  had hx urosepsis and lost a friend to urosepsis as well.   Followed by urology. Last visit 2/15/19  -Continue trimethoprim 50 mg at bedtime for UTI prophylaxis. Refill provided. Will plan to stop this after 3 months.  -Continue vaginal estrogen cream twice  "weekly.  -Continue double voiding.    -Cranberry supplement such as Ellura or Theracran daily.    Supplement: currently taking fish oil BID, folic acid daily (pt was unsure why she is taking - reviewed chart and had been prescribed for oral aphthae, all folate levels have been wnl), vitamin C 500mg daily for cold prevention, AREDS2 BID per ophthalmologist. No concerns about supplements.    Osteoporosis: Current therapy includes: calcium 500/Vitamin D 400 and denosumab (Prolia) SC injections, every 6 months. Pt is not experiencing side effects.  Last vitamin D level: none  DEXA History: 6/26/17  Left femoral neck T-score = -2.7  Right femoral neck T-score= -2.3   Specialist Dr Anthony endocrinology, 4/23/18  1) Osteoporosis: long standing secondary to RA, previous chronic use of steroid, thin body habitus and family hx of osteoporosis. She has been on long term use of oral bisphosphonates. Stopped for 8 months. Recent DXA in 6/2017 showed osteoporosis with worsening BMD at lumbar spine and hips. I would recommend to change to either Prolia or Forteo given recent DXA result and CKD state 3. Will check with insurance coverage.  - continue calcium and vitamin D 1 pill tid  - continue with fall precaution and weight bearing exercise    Hypothyroidism: Patient is taking levothyroxine 75 mcg daily. Patient is having the following symptoms: none.   TSH   Date Value Ref Range Status   01/18/2019 0.73 0.40 - 4.00 mU/L Final     Allergic rhinitis: Current medications include cetirizine 10mg once daily and diphenhydramine 25mg rarely.  Pt feels that current therapy is effective.  No current symptoms, typically worse in the spring from pollens. Wondering if she can stop taking cetirizine every day.     Est CrCl (C[place]roft-Gault, AjBW 61.2kg) ~ 22 ml/min  Wt Readings from Last 1 Encounters:   02/11/19 160 lb 0.9 oz (72.6 kg)     Ht Readings from Last 1 Encounters:   02/11/19 5' 3.5\" (1.613 m)     Creatinine   Date Value " Ref Range Status   02/12/2019 1.76 (H) 0.52 - 1.04 mg/dL Final   ]     Today's Vitals: LMP  (LMP Unknown)  none - phone call    ASSESSMENT:                 Current medications were reviewed today.      Medication Adherence: excellent, no issues identified    GERD: stable. Ranitidine at max dose 150mg/day with current est Crcl. Recommend continuing H2 blocker or PPI for GI prophylaxis sydney with dual therapy aspirin and Eliquis.     constipation: improved, decrease Senna-S as tolerated.    RA: stable.     HFpEF/A fib/mitral regurgitation: improved. Continue to daily weights and follow-up with [facility] regarding need for dose adjustment, concerns regarding fluid restriction.  BP is improved.    Recurrent UTI: unchanged. Provided reassurance and follow-up with PCP regarding current urinary concerns. Recommend continuing to limit anticholinergic medications, including antihistamines (see below).      Supplement: stable    Osteoporosis: Improved. Pt is not meeting RDI of calcium 1200mg/day.  Unclear per chart review why taking calcium once daily instead of TID as had been recommended in the past.  Consider increasing to BID.     Hypothyroidism: Stable. Last TSH is within normal limits.     Allergic rhinitis: stable. To limit side effects, recommend PRN use of cetirizine and continue to use Benadryl sparingly.        PLAN:                  Post Discharge Medication Reconciliation Status: discharge medications reconciled and changed, per note/orders (see AVS).    1. Change cetirizine to PRN  2. Continue aspirin x3 months, then stop per discharge instructions (5/12/19).    I spent 30 minutes with this patient today. All changes were made via collaborative practice agreement with Tre Lockett. A copy of the visit note was provided to the patient's primary care provider.    Will follow up in 1 year as needed.    The patient was mailed a summary of these recommendations as an after visit summary.    Nathaly Christina, PremaD,  BCACP

## 2019-02-18 NOTE — PATIENT INSTRUCTIONS
Recommendations from today's MTM visit:                                                    MTM (medication therapy management) is a service provided by a clinical pharmacist designed to help you get the most of out of your medicines.   Today we reviewed what your medicines are for, how to know if they are working, that your medicines are safe and how to make your medicine regimen as easy as possible.     1. I'm glad you are feeling better and finding that you are less short of breath!     2. Aspirin was prescribed for the next 3 months. Please make a note to STOP taking aspirin in 3 months (May 12th is the last day)    3. OK to continue ranitidine 1 pill at bedtime (not twice a day)    4. Please try taking cetirizine as needed only during allergy season. You can use Benadryl if needed but very sparingly because of the increased risk of falls associated with this medicine.    5. I reviewed your records and found that Folic Acid is prescribed for cold sores.  If you feel like it's helping, you can continue taking it.  Otherwise you could certainly stop it if you'd like to take a break from it.     Next MTM visit: recommend a medicine review once a year or more frequently if you have questions or concerns.    To schedule another MTM appointment, please call the clinic directly or you may call the MTM scheduling line at [phone] or toll-free at [phone].     My Clinical Pharmacist's contact information:                                                      It was a pleasure talking with you today!  Please feel free to contact me with any questions or concerns you have.      Nathaly Christina, [facility]   [phone]    You may receive a survey about the MTM services you received.  I would appreciate your feedback to help me serve you better in the future. Please fill it out and return it when you can. Your comments will be anonymous.

## 2019-02-18 NOTE — PROGRESS NOTES
Received telephone call from patient's daughter, Xi.  Linda has single sided LE edema on her right ankle. At this time, no redness or tenderness was noted.  Overall, Linda's weight is up #1.  No other complaints or concerns.  Linda was instructed to keep limb elevated and she states that she is wearing her TEDS.  Linda has appointments on Wednesday, Feb. 20 with her PCP and [facility] for evaluation s/p MitraClip procedure.  Xi states that she will call her there are further concerns.

## 2019-02-19 ENCOUNTER — TELEPHONE (OUTPATIENT)
Dept: RHEUMATOLOGY | Facility: CLINIC | Age: 84
End: 2019-02-19

## 2019-02-19 ENCOUNTER — MEDICAL CORRESPONDENCE (OUTPATIENT)
Dept: HEALTH INFORMATION MANAGEMENT | Facility: CLINIC | Age: 84
End: 2019-02-19

## 2019-02-19 ENCOUNTER — TELEPHONE (OUTPATIENT)
Dept: FAMILY MEDICINE | Facility: CLINIC | Age: 84
End: 2019-02-19

## 2019-02-19 DIAGNOSIS — I50.30 HEART FAILURE WITH PRESERVED EJECTION FRACTION (H): Primary | ICD-10-CM

## 2019-02-19 NOTE — TELEPHONE ENCOUNTER
Central Prior Authorization Team   Phone: [phone]      PA Initiation    Medication: azaTHIOprine (IMURAN) 50 MG tablet  Insurance Company: [employer] - Phone [phone] Fax [phone]  Pharmacy Filling the Rx: [facility] - [place], [place] - [address] AT PORTAL TO REGISTERED [facility] SITES  Filling Pharmacy Phone: [phone]  Filling Pharmacy Fax:    Start Date: 2/19/2019

## 2019-02-19 NOTE — TELEPHONE ENCOUNTER
Reason for Call: Prior Auth    Detailed comments: Mario (pharmacist) from [facility] called stating that they need a Prior Auth to be completed for patients medication azaTHIOprine (IMURAN) 50 MG tablet. He also states that this prior auth is a B vs. D Determination.     Insurance Company: [employer] Part D  [id number] Phone #: [phone]  Patient Insurance ID: [id number]    Phone Number Patient can be reached at: Other phone number:  [phone]    Best Time: any    Can we leave a detailed message on this number? YES    Call taken on 2/19/2019 at 10:43 AM by Dash Simmons

## 2019-02-19 NOTE — TELEPHONE ENCOUNTER
Notified Elvia knight for PT home care orders per [facility] protocol via detailed message.     Carmenza Miller RN, BSN

## 2019-02-19 NOTE — TELEPHONE ENCOUNTER
Reason for Call:  Home Health Care    Elvia with [facility] called regarding (reason for call):     Orders are needed for this patient.     PT: Home Physical Therapy for two times a week for four weeks to work on balance, gait, and strength.     Pt Provider: Dr. Toledo Vocal     Phone Number Homecare Nurse can be reached at: [phone]    Can we leave a detailed message on this number? YES    Best Time: anytime    Call taken on 2/19/2019 at 3:54 PM by Siva Swanson

## 2019-02-19 NOTE — TELEPHONE ENCOUNTER
"Reason for Call:  Other prescription    Detailed comments: Needs asap all out. Sending High Priority Message.    Phone Number can be reached at:   Toya Guzman \"Xi\" () [phone] (M)     Best Time: any    Can we leave a detailed message on this number? YES    Call taken on 2/19/2019 at 12:46 PM by Maribel Crockett    "

## 2019-02-19 NOTE — TELEPHONE ENCOUNTER
Spoke with patient's daughter, Xi, and advised her that a PA needs to be done for Azathioprine. She verbalized understanding and states her mom got a 3 day advance prescription of this medication.    Nhan Patel RN....2/19/2019 2:01 PM

## 2019-02-20 ENCOUNTER — ANCILLARY PROCEDURE (OUTPATIENT)
Dept: ULTRASOUND IMAGING | Facility: CLINIC | Age: 84
End: 2019-02-20
Attending: NURSE PRACTITIONER
Payer: MEDICARE

## 2019-02-20 ENCOUNTER — OFFICE VISIT (OUTPATIENT)
Dept: CARDIOLOGY | Facility: CLINIC | Age: 84
End: 2019-02-20
Attending: NURSE PRACTITIONER
Payer: MEDICARE

## 2019-02-20 ENCOUNTER — TELEPHONE (OUTPATIENT)
Dept: FAMILY MEDICINE | Facility: CLINIC | Age: 84
End: 2019-02-20

## 2019-02-20 VITALS
HEIGHT: 64 IN | WEIGHT: 162 LBS | DIASTOLIC BLOOD PRESSURE: 69 MMHG | BODY MASS INDEX: 27.66 KG/M2 | SYSTOLIC BLOOD PRESSURE: 110 MMHG | HEART RATE: 71 BPM | OXYGEN SATURATION: 97 %

## 2019-02-20 DIAGNOSIS — I48.0 PAROXYSMAL ATRIAL FIBRILLATION (H): ICD-10-CM

## 2019-02-20 DIAGNOSIS — M79.89 SWELLING OF RIGHT LOWER EXTREMITY: ICD-10-CM

## 2019-02-20 DIAGNOSIS — I50.30 (HFPEF) HEART FAILURE WITH PRESERVED EJECTION FRACTION (H): Primary | ICD-10-CM

## 2019-02-20 DIAGNOSIS — Z95.818 S/P MITRAL VALVE CLIP IMPLANTATION: ICD-10-CM

## 2019-02-20 DIAGNOSIS — Z98.890 S/P MITRAL VALVE CLIP IMPLANTATION: ICD-10-CM

## 2019-02-20 DIAGNOSIS — I34.0 MITRAL VALVE INSUFFICIENCY, UNSPECIFIED ETIOLOGY: ICD-10-CM

## 2019-02-20 DIAGNOSIS — I34.0 NON-RHEUMATIC MITRAL REGURGITATION: ICD-10-CM

## 2019-02-20 DIAGNOSIS — I10 ESSENTIAL HYPERTENSION: ICD-10-CM

## 2019-02-20 DIAGNOSIS — I50.30 HEART FAILURE WITH PRESERVED EJECTION FRACTION (H): ICD-10-CM

## 2019-02-20 DIAGNOSIS — Z98.890 S/P CORONARY ANGIOGRAM: ICD-10-CM

## 2019-02-20 DIAGNOSIS — I50.30 CONGESTIVE HEART FAILURE WITH PRESERVED LV FUNCTION, NYHA CLASS 3 (H): Primary | ICD-10-CM

## 2019-02-20 LAB
ANION GAP SERPL CALCULATED.3IONS-SCNC: 6 MMOL/L (ref 3–14)
BUN SERPL-MCNC: 36 MG/DL (ref 7–30)
CALCIUM SERPL-MCNC: 8.4 MG/DL (ref 8.5–10.1)
CHLORIDE SERPL-SCNC: 106 MMOL/L (ref 94–109)
CO2 SERPL-SCNC: 27 MMOL/L (ref 20–32)
CREAT SERPL-MCNC: 1.43 MG/DL (ref 0.52–1.04)
GFR SERPL CREATININE-BSD FRML MDRD: 33 ML/MIN/{1.73_M2}
GLUCOSE SERPL-MCNC: 105 MG/DL (ref 70–99)
POTASSIUM SERPL-SCNC: 4 MMOL/L (ref 3.4–5.3)
SODIUM SERPL-SCNC: 139 MMOL/L (ref 133–144)

## 2019-02-20 PROCEDURE — 99214 OFFICE O/P EST MOD 30 MIN: CPT | Mod: ZP | Performed by: NURSE PRACTITIONER

## 2019-02-20 PROCEDURE — 36415 COLL VENOUS BLD VENIPUNCTURE: CPT | Performed by: NURSE PRACTITIONER

## 2019-02-20 PROCEDURE — 80048 BASIC METABOLIC PNL TOTAL CA: CPT | Performed by: NURSE PRACTITIONER

## 2019-02-20 PROCEDURE — G0463 HOSPITAL OUTPT CLINIC VISIT: HCPCS | Mod: ZF

## 2019-02-20 RX ORDER — HYDRALAZINE HYDROCHLORIDE 10 MG/1
10 TABLET, FILM COATED ORAL 3 TIMES DAILY
Qty: 90 TABLET | Refills: 3 | Status: SHIPPED | OUTPATIENT
Start: 2019-02-20 | End: 2019-10-16

## 2019-02-20 RX ORDER — FUROSEMIDE 20 MG
TABLET ORAL
Qty: 270 TABLET | Refills: 3 | Status: SHIPPED | OUTPATIENT
Start: 2019-02-20 | End: 2019-03-13

## 2019-02-20 ASSESSMENT — MIFFLIN-ST. JEOR: SCORE: 1146.89

## 2019-02-20 ASSESSMENT — PAIN SCALES - GENERAL: PAINLEVEL: NO PAIN (0)

## 2019-02-20 NOTE — PROGRESS NOTES
HPI:   Linda Spicer is a 87 year old female with history of moderate-severe mitral regurgitation, HFpEF (EF 55-60%) with restrictive physiology on recent cath, atrial fibrillation, tachy-lisa syndrome s/p dual chamber pacemaker, pulmonary fibrosis from rheumatoid arthritis (low normal diffusion on most recent PFTs), mild-moderate primary pulmonary hypertension, CKD baseline Cr 1.5-1.8, and HTN. On Monday February 11, 2019 she obtained the mitraclip.  Leading up to her admission, Mrs. Spicer endorses NYHA class III -IV symptoms and notably has had several admission to cardiology for HFpEF exacerbation and shortness of breath. She did well after Makayla-clip procedure. Suspect her admission for heart failure exacerbations were in part due to her labile hypertension and MR causing her to have shortness of breath. Her BP here was monitored and was normal.     Situation leading up to the mitraclip procedure consisted of worsening RUSSELL although patient does have known mod-severe MR that appears stable on multiple TTE's in the last year. Patient with several recent admissions for tachypnea, shortness of breath; consider that patient's HTN contributing.  Unlikely afib as etiology. Repeat echocardiogram(s) unchanged with normal IVC, normal LV EF, RA 3 mmhg. RHC/LHC (full report below) with equalization of LV and RV end diastolic pressure at 15 mmHg after the fluid challenge with concordant respiratory variability suggestive of restrictive physiology. Her most recent admission 2/7-2/8 for consideration of inpatient makayla-clip after she was seen in clinic and was notably tachypneic and short of breath. She was brought in for medical optimization and inpatient mitral clip however patient appeared well compensated on exam, felt she was not in acute exacerbation of CHF from MR, and she was discharged. She returns today for planned makayla-clip procedure. She is now status post makayla-clip performed without complication. The clip was  successfully deployed in the A2/P2 position and MR was trace following clip with a gradient of 5 mmHg, no significant valve stenosis. Follow up TTE revealed showed a well seated clip with normal mitral valve gradient.     Today the patient presents with her son for follow up. She states she was doing well until Monday (2/18) when she went on a longer more brisk walk at her living facility and felt more winded. Although she states before the mitraclip she was severely restricted with her walking with SOB. She says her main concern today is the swelling she noted on her right leg which started Friday 2/15. She says the swelling has stayed the same for the last couple days. She notes no redness or extra warmth on the leg.  She says she experiences some pain when her foot isn't elevated.  She is on Apixaban 2.5 mg BID and had briefly stopped it per protocol prior to the procedure only and resumed it on Monday evening after the procedure in the morning.  She checks her blood pressures at home and states they stay around 130's /70's. Patient did have her teeth cleaned yesterday but no procedure. She stated her dentist had asked her about the need for an antibiotic.     Denies SOB, orthopnea, weight gain, chest pain, palpitations, lightheadedness, dizziness, near syncopal/syncopal episodes. Linda has been following salt and fluid restrictions.      PAST MEDICAL HISTORY:  Past Medical History:   Diagnosis Date     Adjustment disorder with anxious mood 5/18/2015     Advanced directives, counseling/discussion 8/30/2012    Patient states has Advance Directive and will bring in a copy to clinic. 8/30/2012   Tevin May  [facility] Medical Assistant \       Anemia 9/25/2015     Basal cell cancer      CHF (congestive heart failure) (H) 9/18/2014     CKD (chronic kidney disease) stage 3, GFR 30-59 ml/min (H) 9/29/2015     DDD (degenerative disc disease), lumbar 9/25/2015     Diffuse idiopathic pulmonary fibrosis (H) 5/6/2013      Encounter for palliative care 5/18/2015     History of blood transfusion 9/29/2015     Hypertension goal BP (blood pressure) < 140/90 9/7/2012     Hypothyroid 9/7/2012     Irritable bowel syndrome 10/29/2013     Macular degeneration      Macular degeneration, 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     Nondisplaced spiral fracture of shaft of humerus      Osteoporosis 8/13/2013     Imo Update utility     RA (rheumatoid arthritis) (H) 5/7/2013     Rheumatic fever      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 2/20/2019     Shingles      Spinal stenosis of lumbar region with neurogenic claudication 9/14/2015       FAMILY HISTORY:  Family History   Problem Relation Age of Onset     Hypertension Mother      Psychotic Disorder Father      Diabetes Son      Diabetes Daughter      Blood Disease Daughter        SOCIAL HISTORY:  Social History     Tobacco Use     Smoking status: Never Smoker     Smokeless tobacco: Never Used   Substance Use Topics     Alcohol use: Yes     Comment: rare wine      Drug use: No           CURRENT MEDICATIONS:  Current Outpatient Medications   Medication Sig Dispense Refill     apixaban ANTICOAGULANT (ELIQUIS) 2.5 MG tablet Take 1 tablet (2.5 mg) by mouth 2 times daily Stop taking 2/9 (last dose evening of Feb 8, 2019) in preparation for MitraClip Procedure on Monday 60 tablet 0     aspirin (ASA) 81 MG EC tablet Take 1 tablet (81 mg) by mouth daily 90 tablet 0     azaTHIOprine (IMURAN) 50 MG tablet Take 1 tablet (50 mg) by mouth daily (Patient taking differently: Take 50 mg by mouth every evening ) 30 tablet 0     carvedilol (COREG) 3.125 MG tablet Take 1 tablet (3.125 mg) by mouth 2 times daily (with meals) 60 tablet 0     denosumab (PROLIA) 60 MG/ML SOLN injection Inject 1 mL (60 mg) Subcutaneous every 6 months 1 mL      diphenhydrAMINE (BENADRYL) 25 MG tablet Take 25 mg by mouth as needed Patient takes 25-50mg 1-2 times a day.       furosemide (LASIX) 20 MG tablet 40mg by mouth every morning and 20mg by mouth  "every afternoon 270 tablet 3     hydrALAZINE (APRESOLINE) 10 MG tablet Take 1 tablet (10 mg) by mouth 3 times daily 90 tablet 3     isosorbide mononitrate (IMDUR) 60 MG 24 hr tablet Take 1 tablet (60 mg) by mouth daily 30 tablet 0     levothyroxine (SYNTHROID/LEVOTHROID) 75 MCG tablet Take 1 tablet (75 mcg) by mouth daily 30 tablet 0     nitroGLYcerin (NITROSTAT) 0.4 MG sublingual tablet Place 1 tablet (0.4 mg) under the tongue every 5 minutes as needed for chest pain 25 tablet 11     order for DME Dispense SP Walker-small 1 each 0     order for DME Equipment being ordered: Dispense baffle, for use with nebulizer. 1 each 0     order for DME Equipment being ordered: Nebulizer. Use with Albuterol. 1 each 0     order for DME Equipment being ordered: Dispense face mask.  Mrs. Spicer is immunosuppressed due to rheumatoid arthritis. 1 Box 11     ranitidine (ZANTAC) 150 MG tablet Take 1 tablet (150 mg) by mouth At Bedtime       trimethoprim (TRIMPEX) 100 MG tablet Take 0.5 tablets (50 mg) by mouth daily 45 tablet 0     albuterol (PROVENTIL) (2.5 MG/3ML) 0.083% neb solution Take 1 vial (2.5 mg) by nebulization every 6 hours as needed for shortness of breath / dyspnea or wheezing 360 mL        ROS:  Review Of Systems  Skin: negative  Eyes: negative  Ears/Nose/Throat: negative  Respiratory: No shortness of breath, dyspnea on exertion, cough, or hemoptysis and No shortness of breath  Cardiovascular: negative and lower extremity edema on Rt leg  Gastrointestinal: negative  Genitourinary: negative  Musculoskeletal: negative  Neurologic: negative  Psychiatric: negative  Hematologic/Lymphatic/Immunologic: negative  Endocrine: negative      EXAM:  /69 (BP Location: Left arm, Patient Position: Chair, Cuff Size: Adult Large)   Pulse 71   Ht 1.613 m (5' 3.5\")   Wt 73.5 kg (162 lb)   LMP  (LMP Unknown)   SpO2 97%   BMI 28.25 kg/m    Home weight:  General: alert, articulate, and in no acute distress.  HEENT: normocephalic, " atraumatic, anicteric sclera, EOMI, mucosa moist, no cyanosis.   Neck: neck supple.  No adenopathy, masses, or carotid bruits.  JVP at clavicle- upright sitting position  Heart: regular rhythm, normal S1/S2, no murmur, gallop, rub.  Precordium quiet with normal PMI.     Lungs: clear, no rales, ronchi, or wheezing.  No accessory muscle use, respirations unlabored.   Abdomen: soft, non-tender, bowel sounds present, no hepatomegaly  Extremities:2+ pitting edema- on Rt leg . 1+ on left leg  No cyanosis.  Extremities warm   Palpable  pedal pulses.   Neurological: alert and oriented x 3.  normal speech and affect, no gross motor deficits  Skin:  No ecchymoses, rashes, or clubbing.    Labs:  CBC RESULTS:  Lab Results   Component Value Date    WBC 11.3 (H) 02/12/2019    RBC 2.89 (L) 02/12/2019    HGB 9.8 (L) 02/12/2019    HCT 29.3 (L) 02/12/2019     (H) 02/12/2019    MCH 33.9 (H) 02/12/2019    MCHC 33.4 02/12/2019    RDW 13.2 02/12/2019     02/12/2019       CMP RESULTS:  Lab Results   Component Value Date     02/20/2019    POTASSIUM 4.0 02/20/2019    CHLORIDE 106 02/20/2019    CO2 27 02/20/2019    ANIONGAP 6 02/20/2019     (H) 02/20/2019    BUN 36 (H) 02/20/2019    CR 1.43 (H) 02/20/2019    GFRESTIMATED 33 (L) 02/20/2019    GFRESTBLACK 38 (L) 02/20/2019    FATOUMATA 8.4 (L) 02/20/2019    BILITOTAL 0.4 02/11/2019    ALBUMIN 3.1 (L) 02/11/2019    ALKPHOS 47 02/11/2019    ALT 16 02/11/2019    AST 20 02/11/2019        INR RESULTS:  Lab Results   Component Value Date    INR 1.12 02/11/2019       No components found for: CK  Lab Results   Component Value Date    MAG 2.1 02/12/2019     Lab Results   Component Value Date    NTBNP 3,154 (H) 10/29/2018       Most recent echocardiogram:  Recent Results (from the past 8760 hour(s))   Echo limited (Adults Only)    Narrative    [id number]  [facility]  [id number]  [id number]^CHAD^YANDY^T           [facility],[place]  Echocardiography  Laboratory  [address] [place] [address]     Name: RG WALTER  MRN: [id number]  : 1931  Study Date: 2018 11:24 AM  Age: 87 yrs  Gender: Female  [place] [facility] [place] Study: Mitral Valve Disorder  Ordering Physician: YANDY BONILLA  Performed By: Elver Smith RDCS     BSA: 1.7 m2  Height: 63 in  Weight: 156 lb  BP: 114/63 mmHg  _____________________________________________________________________________  __        Procedure  Limited Portable Echo Adult.  _____________________________________________________________________________  __        Interpretation Summary  Limited study.  Normal biventricular size and systolic function. The Ejection Fraction is  estimated at 55-60%.  Mild to moderate mitral insufficiency is present.  Mild to moderate tricuspid insufficiency is present.  Estimated pulmonary artery systolic pressure is 26 mmHg plus right atrial  pressure.  Compared to ECHO on 10/9, there are no significant changes. Mitral  regurgitation less prominent compared to recent YOLANDA (10/16).     _____________________________________________________________________________  __        Left Ventricle  Left ventricular size is normal. There is increased LV wall thickness. The  Ejection Fraction is estimated at 55-60%. No regional wall motion  abnormalities are seen.     Right Ventricle  The right ventricle is normal size. Global right ventricular function is  normal.     Atria  Moderate right atrial enlargement is present. Moderate left atrial enlargement  is present.        Mitral Valve  Mild to moderate mitral insufficiency is present.     Aortic Valve  Trace aortic insufficiency is present.     Tricuspid Valve  Mild to moderate tricuspid insufficiency is present. Estimated pulmonary  artery systolic pressure is 26 mmHg plus right atrial pressure. There are  multiple TR jets.     Pulmonic Valve  The pulmonic valve cannot be assessed.     Vessels  The inferior vena cava is  normal. The thoracic aorta cannot be assessed.     Pericardium  No pericardial effusion is present.        Compared to Previous Study  Compared to ECHO on 10/9, there are no significant changes. MR on YOALNDA (10/16)  appears more severe.  _____________________________________________________________________________  __           Doppler Measurements & Calculations  TV max P.5 mmHg  TR max adrian: 252.4 cm/sec  TR max P.5 mmHg     _____________________________________________________________________________  __           Report approved by: Blanca Adams 2018 02:40 PM      ECHO COMPLETE WITH OPTISON    Narrative    [id number]  [id number]  [id number]  [id number]^DINAH^CRYS           [facility],[place]  Echocardiography Laboratory  [address] [place], [place] [address]     Name: RG WALTER  MRN: [id number]  : 1931  Study Date: 10/09/2018 01:09 PM  Age: 87 yrs  Gender: Female  [place] [facility] [place]  Reason For Study: CHF  History: CHF  Ordering Physician: CRYS NIX  Referring Physician: ERIC ENGLE  Performed By: Sharonda Galo RDCS     BSA: 1.8 m2  Height: 63 in  Weight: 164 lb  BP: 180/87 mmHg  _____________________________________________________________________________  __        Procedure  Complete Portable Echo Adult. Contrast Optison. Optison (NDC #6150-2702-45)  given intravenously. Patient was given 6 ml mixture of 3 ml Optison and 6 ml  saline. 3 ml wasted.  _____________________________________________________________________________  __        Interpretation Summary  Global and regional left ventricular function is normal with an EF of 55-60%.  The right ventricle is not well visualized.  Mild to moderate tricuspid insufficiency is present.  There is mild pulmonary hypertension.  The inferior vena cava was normal in size.  This study was compared with the study from 14 .  There has been no change.      _____________________________________________________________________________  __        Left Ventricle  Global and regional left ventricular function is normal with an EF of 55-60%.  Left ventricular size is normal. Left ventricular wall thickness is normal.  Left ventricular diastolic function is indeterminate.     Right Ventricle  Likely normal RV function but unable to assess size. The right ventricle is  not well visualized.     Atria  Both atria appear normal. The atrial septum is intact as assessed by color  Doppler .     Mitral Valve  The mitral valve is normal. Mild to moderate mitral insufficiency is present.        Aortic Valve  Aortic valve is normal in structure and function. The aortic valve is  tricuspid. Mild aortic insufficiency is present.     Tricuspid Valve  Mild to moderate tricuspid insufficiency is present. Right ventricular  systolic pressure is 40mmHg above the right atrial pressure. Mild (pulmonary  artery systolic pressure<50mmHg) pulmonary hypertension is present.     Pulmonic Valve  The pulmonic valve is normal.     Vessels  The aorta root is normal. The thoracic aorta is normal. The pulmonary artery  is normal. The inferior vena cava was normal in size with preserved  respiratory variability. Estimated mean right atrial pressure is 3 mmHg  (normal).     Pericardium  No pericardial effusion is present.        Compared to Previous Study  This study was compared with the study from 8/28/14 . There has been no  change.     Attestation  I have personally viewed the imaging and agree with the interpretation and  report as documented by the fellow, Parris Duvall, and/or edited by me.  _____________________________________________________________________________  __     MMode/2D Measurements & Calculations  IVSd: 1.1 cm  LVIDd: 3.7 cm  LVIDs: 1.9 cm  LVPWd: 1.0 cm  FS: 47.7 %  LV mass(C)d: 122.1 grams  LV mass(C)dI: 68.7 grams/m2  Ao root diam: 2.7 cm  LA dimension: 4.3 cm  asc Aorta Diam: 3.4  cm  LA/Ao: 1.6  LVOT diam: 2.0 cm  LVOT area: 3.1 cm2  LA Volume (BP): 51.3 ml     LA Volume Index (BP): 28.8 ml/m2  RWT: 0.54        Doppler Measurements & Calculations  MV E max alvarado: 104.0 cm/sec  MV A max alvarado: 33.8 cm/sec  MV E/A: 3.1  MV dec time: 0.14 sec  Ao V2 max: 89.2 cm/sec  Ao max PG: 3.0 mmHg  Ao V2 mean: 66.3 cm/sec  Ao mean P.0 mmHg  Ao V2 VTI: 16.6 cm  RAJ(I,D): 3.5 cm2  RAJ(V,D): 3.2 cm2  LV V1 max PG: 3.3 mmHg  LV V1 max: 90.7 cm/sec  LV V1 VTI: 18.7 cm  SV(LVOT): 58.7 ml  SI(LVOT): 33.1 ml/m2  PA acc time: 0.06 sec  TR max alvarado: 242.0 cm/sec  TR max P.7 mmHg  AV Alvarado Ratio (DI): 1.0  RAJ Index (cm2/m2): 2.0  Medial E/e': 19.5        _____________________________________________________________________________  __        Report approved by: Blanca Adams 10/09/2018 04:28 PM        Coronary Angiogram 19:  RHC  BSA 1.83  1. HR 67 bpm  2. /83/118 mmHg  3. RA 7/8/6   4. RV 46/5  5. PA 46/18/32   6. PCW 16/16/13   7. PA sat 61%   8. PCW sat --%  9. Hgb 12 g/dL   10. Adrienne CO 2.5   11. Adrienne CI 1.4   12. TD CO 2.7   13. TD CI 1.4   14. PVR 7.8      LHC  1. LVEDP 15 mmHg.  2. No gradient across the aortic valve on pull back.  3. 1.0 liter of normal saline was given prior to performing simultaneous LV/RV pressures to evaluate for restrictive/constrictive physiology given markedly reduced cardiac index of 1.4 by Adrienne and TD. There was equalization of LV and RV end diastolic pressure at 15 mmHg after the fluid challenge with concordant respiratory variability suggestive of restrictive physiology.        Assessment and Plan:    In summary,Linda is doing much better than prior to the mitraclip.  She has been able to ambulate more and we have recommended to her to go slow and steady. She needs to slowly work up to her potential with activity and allow her body time. We also endorsed her good efforts on watching her salt and fluid intake.  We will set up a RT lower extremity US to  check for DVT. No medication changes at this time. We discussed with her that for dental procedures (lifelong) we recommend antibiotics prior. She should call the clinic to obtain a script.  1.  HFpEF with restrictive cardiomyopathy  NYHA Class III  Fluid status: euvolemic- Lasix 40 mg in am and 20 mg in pm.  Antiplatelet:  ASA dose 81 mg    Sleep apnea: not addressed today  NSAID use:  Contraindicated.  Avoid use.    2.  Other comordbid conditions:     # Atrial fibrillation   Tachy-lisa syndrome s/p dual chamber pacemaker  Rate controlled, on apixaban at home; held 2/9 in preparation for MitraClip procedure Monday and resumed without bleeding complication.   -Continue Coreg 3.125 mg BID   -Continue apixaban     # Pulmonary fibrosis- followed by PCP. Not sure if she needs to see Pulmonary medicine again. PCP to decide.     # Rheumatoid arthritis - follows with her Rheumatologist she will follow up on question regarding her Folic Acid and also to obtain PA on RA medication.     # CKD baseline Cr 1.5-1.8- continue to monitor.     # HTN- Continue to monitor - inform us if BP goes over 150 Systolic. Hydralazine 10 mg TID and  isosorbide 60mg daily,    # Recurrent UTI's - per Urology last seen 2/2019  -Continue trimethoprim 50 mg at bedtime for UTI prophylaxis. Refill provided. Will plan to stop this after 3 months.  Continue vaginal estrogen cream twice weekly.  Continue double voiding.  Cranberry supplement such as Ellura or Theracran daily     # Hypothyroidism: Patient is taking levothyroxine 75 mcg daily. Patient is having the following symptoms: none. - PCP following        TSH   Date Value Ref Range Status   01/18/2019 0.73 0.40 - 4.00 mU/L Final      # Allergic rhinitis: Current medications include cetirizine 10mg once daily and diphenhydramine 25mg rarely.  Pt feels that current therapy is effective.  No current symptoms, typically worse in the spring from [place]. Wondering if she can stop taking cetirizine  every day.- PCP address             3.  Follow-up : Dr Loza in one month      Montrell MOELLER, CNP  [facility]  Patient seen and discussed with Brook LARA DNP      Patient see and discussed with Montrell Carey NP, agree with above. Discharge Summary notes concern for restrictive cardiomyopathy on her RHC but RA:wedge ratio was 1:2 and she did not have evidence of septal bounce. Defer to Dr Loza whether or not patient should also establish care with heart failure attending.     Brook Reeves DNP, NP-C  2/24/2019

## 2019-02-20 NOTE — PATIENT INSTRUCTIONS
Cardiology Provider you saw during your visit:  Brook Reeves NP and Montrell Carey NP    Results for RG WALTER (MRN [id number]) as of 2/20/2019 13:18   Ref. Range 2/20/2019 12:52   Sodium Latest Ref Range: 133 - 144 mmol/L 139   Potassium Latest Ref Range: 3.4 - 5.3 mmol/L 4.0   Chloride Latest Ref Range: 94 - 109 mmol/L 106   Carbon Dioxide Latest Ref Range: 20 - 32 mmol/L 27   Urea Nitrogen Latest Ref Range: 7 - 30 mg/dL 36 (H)   Creatinine Latest Ref Range: 0.52 - 1.04 mg/dL 1.43 (H)   GFR Estimate Latest Ref Range: >60 mL/min/1.73_m2 33 (L)   GFR Estimate If Black Latest Ref Range: >60 mL/min/1.73_m2 38 (L)   Calcium Latest Ref Range: 8.5 - 10.1 mg/dL 8.4 (L)   Anion Gap Latest Ref Range: 3 - 14 mmol/L 6   Glucose Latest Ref Range: 70 - 99 mg/dL 105 (H)       Medication changes:  1. No changes.   2. Speak to your Rheumatologist about stopping Folic Acid.     Follow up:    Please return to clinic for follow-up:  With a General Cardiologist in 1 month.       Please call BRANDI Woo if you have any of the following symptoms so we can adjust your medications as needed over the phone:  1. Weight gain of more than 2 pounds in a day or 5 pounds in a week.  2. Increased shortness of breath, swelling or bloating.  3. Dizziness, lightheadedness.   4. Any questions or concerns.       Please limit your fluid intake to 2 L (68 ounces) daily.  2 Liters a day = 8.5 cups, or 68 ounces.    Please limit your salt intake to 2 grams a day or less.    Follow the American Heart Association Diet and Lifestyle recommendations:    Limit saturated fat, trans fat, sodium, red meat, sweets and sugar-sweetened beverages. If you choose to eat red meat, compare labels and select the leanest cuts available.    Aim for at least 150 minutes of moderate physical activity or 75 minutes of vigorous physical activity - or an equal combination of both - each week.    During business hours: [phone], press option # 1 to be routed to the  [facility] then option # 3 for medical questions to speak with a nurse.     After hours, weekends or holidays: On Call Cardiologist- [phone]   option #4 and ask to speak to the on-call Cardiologist. Inform them you are a CORE/heart failure patient at the [facility].        Amna Casey, RN  Cardiology Nurse Care Coordinator    Keep up the good work!  _______________________________________________________  [facility] Cardiomyopathy, Optimization, Rehabilitation, Education   The [facility].R[facility] CLINIC is a heart failure specialty clinic within the [facility] where you will work with specialized nurse practitioners dedicated to helping patients with heart failure carefully adjust medications, receive education, and learn who and when to call if symptoms develop. They specialize in helping you better understand your condition, slow the progression of your disease, improve the length and quality of your life, help you detect future heart problems before they become life threatening, and avoid hospitalizations.  As always, thank you for trusting us with your health care needs!

## 2019-02-20 NOTE — TELEPHONE ENCOUNTER
Called and informed [employer] that forms have been faxed over.  Faxed over Fly me to the [facility].    Alisha Toure MA on 2/20/2019 at 1:46 PM

## 2019-02-20 NOTE — NURSING NOTE
Vitals completed successfully and medication reconciled.     Ayesha Grayson, LATASHA  1:11 PM  Chief Complaint   Patient presents with     Follow Up      Hospital follow-up, Return CORE, s/p Makayla Clip, 88 yo female, HFpEF, labs prior.

## 2019-02-20 NOTE — TELEPHONE ENCOUNTER
.Reason for Call:    forms    Detailed comments: faxed in on 02-04 and 02-14 for home health certification and plan of care    Phone Number Patient can be reached at: Other phone number:  [phone]    Best Time: anytime    Can we leave a detailed message on this number? YES    Call taken on 2/20/2019 at 10:45 AM by Radha Martins

## 2019-02-20 NOTE — LETTER
2/20/2019      RE: Linda Spicer  [address]  [address]  [facility] [address]       Dear Colleague,    Thank you for the opportunity to participate in the care of your patient, Linda Spicer, at the [facility] at [facility]. Please see a copy of my visit note below.      HPI:   Linda Spicer is a 87 year old female with history of moderate-severe mitral regurgitation, HFpEF (EF 55-60%) with restrictive physiology on recent cath, atrial fibrillation, tachy-lisa syndrome s/p dual chamber pacemaker, pulmonary fibrosis from rheumatoid arthritis (low normal diffusion on most recent PFTs), mild-moderate primary pulmonary hypertension, CKD baseline Cr 1.5-1.8, and HTN. On Monday February 11, 2019 she obtained the mitraclip.  Leading up to her admission, Mrs. Spicer endorses NYHA class III -IV symptoms and notably has had several admission to cardiology for HFpEF exacerbation and shortness of breath. She did well after Makayla-clip procedure. Suspect her admission for heart failure exacerbations were in part due to her labile hypertension and MR causing her to have shortness of breath. Her BP here was monitored and was normal.     Situation leading up to the mitraclip procedure consisted of worsening RUSSELL although patient does have known mod-severe MR that appears stable on multiple TTE's in the last year. Patient with several recent admissions for tachypnea, shortness of breath; consider that patient's HTN contributing.  Unlikely afib as etiology. Repeat echocardiogram(s) unchanged with normal IVC, normal LV EF, RA 3 mmhg. RHC/LHC (full report below) with equalization of LV and RV end diastolic pressure at 15 mmHg after the fluid challenge with concordant respiratory variability suggestive of restrictive physiology. Her most recent admission 2/7-2/8 for consideration of inpatient makayla-clip after she was seen in clinic and was notably tachypneic and short of breath.  She was brought in for medical optimization and inpatient mitral clip however patient appeared well compensated on exam, felt she was not in acute exacerbation of CHF from MR, and she was discharged. She returns today for planned mireille-clip procedure. She is now status post mireille-clip performed without complication. The clip was successfully deployed in the A2/P2 position and MR was trace following clip with a gradient of 5 mmHg, no significant valve stenosis. Follow up TTE revealed showed a well seated clip with normal mitral valve gradient.     Today the patient presents with her son for follow up. She states she was doing well until Monday (2/18) when she went on a longer more brisk walk at her living facility and felt more winded. Although she states before the mitraclip she was severely restricted with her walking with SOB. She says her main concern today is the swelling she noted on her right leg which started Friday 2/15. She says the swelling has stayed the same for the last couple days. She notes no redness or extra warmth on the leg.  She says she experiences some pain when her foot isn't elevated.  She is on Apixaban 2.5 mg BID and had briefly stopped it per protocol prior to the procedure only and resumed it on Monday evening after the procedure in the morning.  She checks her blood pressures at home and states they stay around 130's /70's. Patient did have her teeth cleaned yesterday but no procedure. She stated her dentist had asked her about the need for an antibiotic.     Denies SOB, orthopnea, weight gain, chest pain, palpitations, lightheadedness, dizziness, near syncopal/syncopal episodes. Linda has been following salt and fluid restrictions.      PAST MEDICAL HISTORY:  Past Medical History:   Diagnosis Date     Adjustment disorder with anxious mood 5/18/2015     Advanced directives, counseling/discussion 8/30/2012    Patient states has Advance Directive and will bring in a copy to clinic.  8/30/2012   [facility] Medical Assistant \       Anemia 9/25/2015     Basal cell cancer      CHF (congestive heart failure) (H) 9/18/2014     CKD (chronic kidney disease) stage 3, GFR 30-59 ml/min (H) 9/29/2015     DDD (degenerative disc disease), lumbar 9/25/2015     Diffuse idiopathic pulmonary fibrosis (H) 5/6/2013     Encounter for palliative care 5/18/2015     History of blood transfusion 9/29/2015     Hypertension goal BP (blood pressure) < 140/90 9/7/2012     Hypothyroid 9/7/2012     Irritable bowel syndrome 10/29/2013     Macular degeneration      Macular degeneration, 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     Nondisplaced spiral fracture of shaft of humerus      Osteoporosis 8/13/2013     Imo Update utility     RA (rheumatoid arthritis) (H) 5/7/2013     Rheumatic fever      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 2/20/2019     Shingles      Spinal stenosis of lumbar region with neurogenic claudication 9/14/2015       FAMILY HISTORY:  Family History   Problem Relation Age of Onset     Hypertension Mother      Psychotic Disorder Father      Diabetes Son      Diabetes Daughter      Blood Disease Daughter        SOCIAL HISTORY:  Social History     Tobacco Use     Smoking status: Never Smoker     Smokeless tobacco: Never Used   Substance Use Topics     Alcohol use: Yes     Comment: rare wine      Drug use: No           CURRENT MEDICATIONS:  Current Outpatient Medications   Medication Sig Dispense Refill     apixaban ANTICOAGULANT (ELIQUIS) 2.5 MG tablet Take 1 tablet (2.5 mg) by mouth 2 times daily Stop taking 2/9 (last dose evening of Feb 8, 2019) in preparation for MitraClip Procedure on Monday 60 tablet 0     aspirin (ASA) 81 MG EC tablet Take 1 tablet (81 mg) by mouth daily 90 tablet 0     azaTHIOprine (IMURAN) 50 MG tablet Take 1 tablet (50 mg) by mouth daily (Patient taking differently: Take 50 mg by mouth every evening ) 30 tablet 0     carvedilol (COREG) 3.125 MG tablet Take 1 tablet (3.125 mg) by mouth 2  times daily (with meals) 60 tablet 0     denosumab (PROLIA) 60 MG/ML SOLN injection Inject 1 mL (60 mg) Subcutaneous every 6 months 1 mL      diphenhydrAMINE (BENADRYL) 25 MG tablet Take 25 mg by mouth as needed Patient takes 25-50mg 1-2 times a day.       furosemide (LASIX) 20 MG tablet 40mg by mouth every morning and 20mg by mouth every afternoon 270 tablet 3     hydrALAZINE (APRESOLINE) 10 MG tablet Take 1 tablet (10 mg) by mouth 3 times daily 90 tablet 3     isosorbide mononitrate (IMDUR) 60 MG 24 hr tablet Take 1 tablet (60 mg) by mouth daily 30 tablet 0     levothyroxine (SYNTHROID/LEVOTHROID) 75 MCG tablet Take 1 tablet (75 mcg) by mouth daily 30 tablet 0     nitroGLYcerin (NITROSTAT) 0.4 MG sublingual tablet Place 1 tablet (0.4 mg) under the tongue every 5 minutes as needed for chest pain 25 tablet 11     order for DME Dispense SP Walker-small 1 each 0     order for DME Equipment being ordered: Dispense baffle, for use with nebulizer. 1 each 0     order for DME Equipment being ordered: Nebulizer. Use with Albuterol. 1 each 0     order for DME Equipment being ordered: Dispense face mask.  Mrs. Spicer is immunosuppressed due to rheumatoid arthritis. 1 Box 11     ranitidine (ZANTAC) 150 MG tablet Take 1 tablet (150 mg) by mouth At Bedtime       trimethoprim (TRIMPEX) 100 MG tablet Take 0.5 tablets (50 mg) by mouth daily 45 tablet 0     albuterol (PROVENTIL) (2.5 MG/3ML) 0.083% neb solution Take 1 vial (2.5 mg) by nebulization every 6 hours as needed for shortness of breath / dyspnea or wheezing 360 mL        ROS:  Review Of Systems  Skin: negative  Eyes: negative  Ears/Nose/Throat: negative  Respiratory: No shortness of breath, dyspnea on exertion, cough, or hemoptysis and No shortness of breath  Cardiovascular: negative and lower extremity edema on Rt leg  Gastrointestinal: negative  Genitourinary: negative  Musculoskeletal: negative  Neurologic: negative  Psychiatric:  "negative  Hematologic/Lymphatic/Immunologic: negative  Endocrine: negative      EXAM:  /69 (BP Location: Left arm, Patient Position: Chair, Cuff Size: Adult Large)   Pulse 71   Ht 1.613 m (5' 3.5\")   Wt 73.5 kg (162 lb)   LMP  (LMP Unknown)   SpO2 97%   BMI 28.25 kg/m     Home weight:  General: alert, articulate, and in no acute distress.  HEENT: normocephalic, atraumatic, anicteric sclera, EOMI, mucosa moist, no cyanosis.   Neck: neck supple.  No adenopathy, masses, or carotid bruits.  JVP at clavicle- upright sitting position  Heart: regular rhythm, normal S1/S2, no murmur, gallop, rub.  Precordium quiet with normal PMI.     Lungs: clear, no rales, ronchi, or wheezing.  No accessory muscle use, respirations unlabored.   Abdomen: soft, non-tender, bowel sounds present, no hepatomegaly  Extremities:2+ pitting edema- on Rt leg . 1+ on left leg  No cyanosis.  Extremities warm   Palpable  pedal pulses.   Neurological: alert and oriented x 3.  normal speech and affect, no gross motor deficits  Skin:  No ecchymoses, rashes, or clubbing.    Labs:  CBC RESULTS:  Lab Results   Component Value Date    WBC 11.3 (H) 02/12/2019    RBC 2.89 (L) 02/12/2019    HGB 9.8 (L) 02/12/2019    HCT 29.3 (L) 02/12/2019     (H) 02/12/2019    MCH 33.9 (H) 02/12/2019    MCHC 33.4 02/12/2019    RDW 13.2 02/12/2019     02/12/2019       CMP RESULTS:  Lab Results   Component Value Date     02/20/2019    POTASSIUM 4.0 02/20/2019    CHLORIDE 106 02/20/2019    CO2 27 02/20/2019    ANIONGAP 6 02/20/2019     (H) 02/20/2019    BUN 36 (H) 02/20/2019    CR 1.43 (H) 02/20/2019    GFRESTIMATED 33 (L) 02/20/2019    GFRESTBLACK 38 (L) 02/20/2019    FATOUMATA 8.4 (L) 02/20/2019    BILITOTAL 0.4 02/11/2019    ALBUMIN 3.1 (L) 02/11/2019    ALKPHOS 47 02/11/2019    ALT 16 02/11/2019    AST 20 02/11/2019        INR RESULTS:  Lab Results   Component Value Date    INR 1.12 02/11/2019       No components found for: CK  Lab Results "   Component Value Date    MAG 2.1 2019     Lab Results   Component Value Date    NTBNP 3,154 (H) 10/29/2018       Most recent echocardiogram:  Recent Results (from the past 8760 hour(s))   Echo limited (Adults Only)    Narrative    [id number]  ECH11  [id number]  [id number]^CHAD^YANDY^JORGE L           [facility],[place]  Echocardiography Laboratory  [address] [place] [address]     Name: RG WALTER  MRN: [id number]  : 1931  Study Date: 2018 11:24 AM  Age: 87 yrs  Gender: Female  Patient Location: [facility] [place]  Reason For Study: Mitral Valve Disorder  Ordering Physician: YANDY BONILLA  Performed By: Evler Smith RDCS     BSA: 1.7 m2  Height: 63 in  Weight: 156 lb  BP: 114/63 mmHg  _____________________________________________________________________________  __        Procedure  Limited Portable Echo Adult.  _____________________________________________________________________________  __        Interpretation Summary  Limited study.  Normal biventricular size and systolic function. The Ejection Fraction is  estimated at 55-60%.  Mild to moderate mitral insufficiency is present.  Mild to moderate tricuspid insufficiency is present.  Estimated pulmonary artery systolic pressure is 26 mmHg plus right atrial  pressure.  Compared to ECHO on 10/9, there are no significant changes. Mitral  regurgitation less prominent compared to recent YOLANDA (10/16).     _____________________________________________________________________________  __        Left Ventricle  Left ventricular size is normal. There is increased LV wall thickness. The  Ejection Fraction is estimated at 55-60%. No regional wall motion  abnormalities are seen.     Right Ventricle  The right ventricle is normal size. Global right ventricular function is  normal.     Atria  Moderate right atrial enlargement is present. Moderate left atrial enlargement  is present.        Mitral Valve  Mild to moderate mitral  insufficiency is present.     Aortic Valve  Trace aortic insufficiency is present.     Tricuspid Valve  Mild to moderate tricuspid insufficiency is present. Estimated pulmonary  artery systolic pressure is 26 mmHg plus right atrial pressure. There are  multiple TR jets.     Pulmonic Valve  The pulmonic valve cannot be assessed.     Vessels  The inferior vena cava is normal. The thoracic aorta cannot be assessed.     Pericardium  No pericardial effusion is present.        Compared to Previous Study  Compared to ECHO on 10/9, there are no significant changes. MR on YOLANDA (10/16)  appears more severe.  _____________________________________________________________________________  __           Doppler Measurements & Calculations  TV max P.5 mmHg  TR max adrian: 252.4 cm/sec  TR max P.5 mmHg     _____________________________________________________________________________  __           Report approved by: Blanca Adams 2018 02:40 PM      ECHO COMPLETE WITH OPTISON    Narrative    [id number]  ECH73  [id number]  [id number]^DINAH^CRYS           [facility],[place]  Echocardiography Laboratory  [address] [place] [id number]     Name: RG WALTER  MRN: [id number]  : 1931  Study Date: 10/09/2018 01:09 PM  Age: 87 yrs  Gender: Female  Patient Location: [facility] [place]  Reason For Study: CHF  History: CHF  Ordering Physician: CRYS NIX  Referring Physician: ERIC ENGLE  Performed By: Sharonda Galo RDCS     BSA: 1.8 m2  Height: 63 in  Weight: 164 lb  BP: 180/87 mmHg  _____________________________________________________________________________  __        Procedure  Complete Portable Echo Adult. Contrast Optison. Optison (NDC #2042-6963-86)  given intravenously. Patient was given 6 ml mixture of 3 ml Optison and 6 ml  saline. 3 ml wasted.  _____________________________________________________________________________  __        Interpretation  Summary  Global and regional left ventricular function is normal with an EF of 55-60%.  The right ventricle is not well visualized.  Mild to moderate tricuspid insufficiency is present.  There is mild pulmonary hypertension.  The inferior vena cava was normal in size.  This study was compared with the study from 8/28/14 .  There has been no change.     _____________________________________________________________________________  __        Left Ventricle  Global and regional left ventricular function is normal with an EF of 55-60%.  Left ventricular size is normal. Left ventricular wall thickness is normal.  Left ventricular diastolic function is indeterminate.     Right Ventricle  Likely normal RV function but unable to assess size. The right ventricle is  not well visualized.     Atria  Both atria appear normal. The atrial septum is intact as assessed by color  Doppler .     Mitral Valve  The mitral valve is normal. Mild to moderate mitral insufficiency is present.        Aortic Valve  Aortic valve is normal in structure and function. The aortic valve is  tricuspid. Mild aortic insufficiency is present.     Tricuspid Valve  Mild to moderate tricuspid insufficiency is present. Right ventricular  systolic pressure is 40mmHg above the right atrial pressure. Mild (pulmonary  artery systolic pressure<50mmHg) pulmonary hypertension is present.     Pulmonic Valve  The pulmonic valve is normal.     Vessels  The aorta root is normal. The thoracic aorta is normal. The pulmonary artery  is normal. The inferior vena cava was normal in size with preserved  respiratory variability. Estimated mean right atrial pressure is 3 mmHg  (normal).     Pericardium  No pericardial effusion is present.        Compared to Previous Study  This study was compared with the study from 8/28/14 . There has been no  change.     Attestation  I have personally viewed the imaging and agree with the interpretation and  report as documented by the  fellow, Parris Duvall, and/or edited by me.  _____________________________________________________________________________  __     MMode/2D Measurements & Calculations  IVSd: 1.1 cm  LVIDd: 3.7 cm  LVIDs: 1.9 cm  LVPWd: 1.0 cm  FS: 47.7 %  LV mass(C)d: 122.1 grams  LV mass(C)dI: 68.7 grams/m2  Ao root diam: 2.7 cm  LA dimension: 4.3 cm  asc Aorta Diam: 3.4 cm  LA/Ao: 1.6  LVOT diam: 2.0 cm  LVOT area: 3.1 cm2  LA Volume (BP): 51.3 ml     LA Volume Index (BP): 28.8 ml/m2  RWT: 0.54        Doppler Measurements & Calculations  MV E max alvarado: 104.0 cm/sec  MV A max alvarado: 33.8 cm/sec  MV E/A: 3.1  MV dec time: 0.14 sec  Ao V2 max: 89.2 cm/sec  Ao max PG: 3.0 mmHg  Ao V2 mean: 66.3 cm/sec  Ao mean P.0 mmHg  Ao V2 VTI: 16.6 cm  RAJ(I,D): 3.5 cm2  RAJ(V,D): 3.2 cm2  LV V1 max PG: 3.3 mmHg  LV V1 max: 90.7 cm/sec  LV V1 VTI: 18.7 cm  SV(LVOT): 58.7 ml  SI(LVOT): 33.1 ml/m2  PA acc time: 0.06 sec  TR max alvarado: 242.0 cm/sec  TR max P.7 mmHg  AV Alvarado Ratio (DI): 1.0  RAJ Index (cm2/m2): 2.0  Medial E/e': 19.5        _____________________________________________________________________________  __        Report approved by: Blanca Adams 10/09/2018 04:28 PM        Coronary Angiogram 19:  C  BSA 1.83  1. HR 67 bpm  2. /83/118 mmHg  3. RA 7/8/6   4. RV 46/5  5. PA 46/18/32   6. PCW 16/16/13   7. PA sat 61%   8. PCW sat --%  9. Hgb 12 g/dL   10. Adrienne CO 2.5   11. Adrienne CI 1.4   12. TD CO 2.7   13. TD CI 1.4   14. PVR 7.8      LHC  1. LVEDP 15 mmHg.  2. No gradient across the aortic valve on pull back.  3. 1.0 liter of normal saline was given prior to performing simultaneous LV/RV pressures to evaluate for restrictive/constrictive physiology given markedly reduced cardiac index of 1.4 by Adrienne and TD. There was equalization of LV and RV end diastolic pressure at 15 mmHg after the fluid challenge with concordant respiratory variability suggestive of restrictive physiology.        Assessment and Plan:    In  summary,Linda is doing much better than prior to the mitraclip.  She has been able to ambulate more and we have recommended to her to go slow and steady. She needs to slowly work up to her potential with activity and allow her body time. We also endorsed her good efforts on watching her salt and fluid intake.  We will set up a RT lower extremity US to check for DVT. No medication changes at this time. We discussed with her that for dental procedures (lifelong) we recommend antibiotics prior. She should call the clinic to obtain a script.  1.  HFpEF with restrictive cardiomyopathy  NYHA Class III  Fluid status: euvolemic- Lasix 40 mg in am and 20 mg in pm.  Antiplatelet:  ASA dose 81 mg    Sleep apnea: not addressed today  NSAID use:  Contraindicated.  Avoid use.    2.  Other comordbid conditions:     # Atrial fibrillation   Tachy-lisa syndrome s/p dual chamber pacemaker  Rate controlled, on apixaban at home; held 2/9 in preparation for MitraClip procedure Monday and resumed without bleeding complication.   -Continue Coreg 3.125 mg BID   -Continue apixaban     # Pulmonary fibrosis- followed by PCP. Not sure if she needs to see Pulmonary medicine again. PCP to decide.     # Rheumatoid arthritis - follows with her Rheumatologist she will follow up on question regarding her Folic Acid and also to obtain PA on RA medication.     # CKD baseline Cr 1.5-1.8- continue to monitor.     # HTN- Continue to monitor - inform us if BP goes over 150 Systolic. Hydralazine 10 mg TID and  isosorbide 60mg daily,    # Recurrent UTI's - per Urology last seen 2/2019  -Continue trimethoprim 50 mg at bedtime for UTI prophylaxis. Refill provided. Will plan to stop this after 3 months.  Continue vaginal estrogen cream twice weekly.  Continue double voiding.  Cranberry supplement such as Ellura or Theracran daily     # Hypothyroidism: Patient is taking levothyroxine 75 mcg daily. Patient is having the following symptoms: none.  - PCP  following        TSH   Date Value Ref Range Status   01/18/2019 0.73 0.40 - 4.00 mU/L Final      # Allergic rhinitis: Current medications include cetirizine 10mg once daily and diphenhydramine 25mg rarely.  Pt feels that current therapy is effective.  No current symptoms, typically worse in the spring from pollens. Wondering if she can stop taking cetirizine every day.- PCP address             3.  Follow-up : Dr Loza in one month      Montrell MOELLER CNP  [facility]  Patient seen and discussed with Brook LARA DNP      Patient see and discussed with Montrell Carey NP, agree with above  Brook Reeves DNP, NP-C  2/24/2019                    HPI:   Linda Spicer is a 87 year old female with history of moderate-severe mitral regurgitation, HFpEF (EF 55-60%) with restrictive physiology on recent cath, atrial fibrillation, tachy-lisa syndrome s/p dual chamber pacemaker, pulmonary fibrosis from rheumatoid arthritis (low normal diffusion on most recent PFTs), mild-moderate primary pulmonary hypertension, CKD baseline Cr 1.5-1.8, and HTN. On Monday February 11, 2019 she obtained the mitraclip.  Leading up to her admission, Mrs. Spicer endorses NYHA class III -IV symptoms and notably has had several admission to cardiology for HFpEF exacerbation and shortness of breath. She did well after Makayla-clip procedure. Suspect her admission for heart failure exacerbations were in part due to her labile hypertension and MR causing her to have shortness of breath. Her BP here was monitored and was normal.     Situation leading up to the mitraclip procedure consisted of worsening RUSSELL although patient does have known mod-severe MR that appears stable on multiple TTE's in the last year. Patient with several recent admissions for tachypnea, shortness of breath; consider that patient's HTN contributing.  Unlikely afib as etiology. Repeat echocardiogram(s) unchanged with normal IVC, normal LV EF, RA 3 mmhg. RHC/LHC (full report below)  with equalization of LV and RV end diastolic pressure at 15 mmHg after the fluid challenge with concordant respiratory variability suggestive of restrictive physiology. Her most recent admission 2/7-2/8 for consideration of inpatient mireille-clip after she was seen in clinic and was notably tachypneic and short of breath. She was brought in for medical optimization and inpatient mitral clip however patient appeared well compensated on exam, felt she was not in acute exacerbation of CHF from MR, and she was discharged. She returns today for planned mireille-clip procedure. She is now status post mireille-clip performed without complication. The clip was successfully deployed in the A2/P2 position and MR was trace following clip with a gradient of 5 mmHg, no significant valve stenosis. Follow up TTE revealed showed a well seated clip with normal mitral valve gradient.     Today the patient presents with her son for follow up. She states she was doing well until Monday (2/18) when she went on a longer more brisk walk at her living facility and felt more winded. Although she states before the mitraclip she was severely restricted with her walking with SOB. She says her main concern today is the swelling she noted on her right leg which started Friday 2/15. She says the swelling has stayed the same for the last couple days. She notes no redness or extra warmth on the leg.  She says she experiences some pain when her foot isn't elevated.  She is on Apixaban 2.5 mg BID and had briefly stopped it per protocol prior to the procedure only and resumed it on Monday evening after the procedure in the morning.  She checks her blood pressures at home and states they stay around 130's /70's. Patient did have her teeth cleaned yesterday but no procedure. She stated her dentist had asked her about the need for an antibiotic.     Denies SOB, orthopnea, weight gain, chest pain, palpitations, lightheadedness, dizziness, near syncopal/syncopal  jobDriss Mckeon has been following salt and fluid restrictions.      PAST MEDICAL HISTORY:  Past Medical History:   Diagnosis Date     Adjustment disorder with anxious mood 5/18/2015     Advanced directives, counseling/discussion 8/30/2012    Patient states has Advance Directive and will bring in a copy to clinic. 8/30/2012   Tevin May  [facility] Medical Assistant \       Anemia 9/25/2015     Basal cell cancer      CHF (congestive heart failure) (H) 9/18/2014     CKD (chronic kidney disease) stage 3, GFR 30-59 ml/min (H) 9/29/2015     DDD (degenerative disc disease), lumbar 9/25/2015     Diffuse idiopathic pulmonary fibrosis (H) 5/6/2013     Encounter for palliative care 5/18/2015     History of blood transfusion 9/29/2015     Hypertension goal BP (blood pressure) < 140/90 9/7/2012     Hypothyroid 9/7/2012     Irritable bowel syndrome 10/29/2013     Macular degeneration      Macular degeneration, 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     Nondisplaced spiral fracture of shaft of humerus      Osteoporosis 8/13/2013     Imo Update utility     RA (rheumatoid arthritis) (H) 5/7/2013     Rheumatic fever      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 2/20/2019     Shingles      Spinal stenosis of lumbar region with neurogenic claudication 9/14/2015       FAMILY HISTORY:  Family History   Problem Relation Age of Onset     Hypertension Mother      Psychotic Disorder Father      Diabetes Son      Diabetes Daughter      Blood Disease Daughter        SOCIAL HISTORY:  Social History     Tobacco Use     Smoking status: Never Smoker     Smokeless tobacco: Never Used   Substance Use Topics     Alcohol use: Yes     Comment: rare wine      Drug use: No           CURRENT MEDICATIONS:  Current Outpatient Medications   Medication Sig Dispense Refill     apixaban ANTICOAGULANT (ELIQUIS) 2.5 MG tablet Take 1 tablet (2.5 mg) by mouth 2 times daily Stop taking 2/9 (last dose evening of Feb 8, 2019) in preparation for MitraClip Procedure on Monday 60  tablet 0     aspirin (ASA) 81 MG EC tablet Take 1 tablet (81 mg) by mouth daily 90 tablet 0     azaTHIOprine (IMURAN) 50 MG tablet Take 1 tablet (50 mg) by mouth daily (Patient taking differently: Take 50 mg by mouth every evening ) 30 tablet 0     carvedilol (COREG) 3.125 MG tablet Take 1 tablet (3.125 mg) by mouth 2 times daily (with meals) 60 tablet 0     denosumab (PROLIA) 60 MG/ML SOLN injection Inject 1 mL (60 mg) Subcutaneous every 6 months 1 mL      diphenhydrAMINE (BENADRYL) 25 MG tablet Take 25 mg by mouth as needed Patient takes 25-50mg 1-2 times a day.       furosemide (LASIX) 20 MG tablet 40mg by mouth every morning and 20mg by mouth every afternoon 270 tablet 3     hydrALAZINE (APRESOLINE) 10 MG tablet Take 1 tablet (10 mg) by mouth 3 times daily 90 tablet 3     isosorbide mononitrate (IMDUR) 60 MG 24 hr tablet Take 1 tablet (60 mg) by mouth daily 30 tablet 0     levothyroxine (SYNTHROID/LEVOTHROID) 75 MCG tablet Take 1 tablet (75 mcg) by mouth daily 30 tablet 0     nitroGLYcerin (NITROSTAT) 0.4 MG sublingual tablet Place 1 tablet (0.4 mg) under the tongue every 5 minutes as needed for chest pain 25 tablet 11     order for DME Dispense SP Walker-small 1 each 0     order for DME Equipment being ordered: Dispense baffle, for use with nebulizer. 1 each 0     order for DME Equipment being ordered: Nebulizer. Use with Albuterol. 1 each 0     order for DME Equipment being ordered: Dispense face mask.  Mrs. Spicer is immunosuppressed due to rheumatoid arthritis. 1 Box 11     ranitidine (ZANTAC) 150 MG tablet Take 1 tablet (150 mg) by mouth At Bedtime       trimethoprim (TRIMPEX) 100 MG tablet Take 0.5 tablets (50 mg) by mouth daily 45 tablet 0     albuterol (PROVENTIL) (2.5 MG/3ML) 0.083% neb solution Take 1 vial (2.5 mg) by nebulization every 6 hours as needed for shortness of breath / dyspnea or wheezing 360 mL        ROS:  Review Of Systems  Skin: negative  Eyes: negative  Ears/Nose/Throat:  "negative  Respiratory: No shortness of breath, dyspnea on exertion, cough, or hemoptysis and No shortness of breath  Cardiovascular: negative and lower extremity edema on Rt leg  Gastrointestinal: negative  Genitourinary: negative  Musculoskeletal: negative  Neurologic: negative  Psychiatric: negative  Hematologic/Lymphatic/Immunologic: negative  Endocrine: negative      EXAM:  /69 (BP Location: Left arm, Patient Position: Chair, Cuff Size: Adult Large)   Pulse 71   Ht 1.613 m (5' 3.5\")   Wt 73.5 kg (162 lb)   LMP  (LMP Unknown)   SpO2 97%   BMI 28.25 kg/m     Home weight:  General: alert, articulate, and in no acute distress.  HEENT: normocephalic, atraumatic, anicteric sclera, EOMI, mucosa moist, no cyanosis.   Neck: neck supple.  No adenopathy, masses, or carotid bruits.  JVP at clavicle- upright sitting position  Heart: regular rhythm, normal S1/S2, no murmur, gallop, rub.  Precordium quiet with normal PMI.     Lungs: clear, no rales, ronchi, or wheezing.  No accessory muscle use, respirations unlabored.   Abdomen: soft, non-tender, bowel sounds present, no hepatomegaly  Extremities:2+ pitting edema- on Rt leg . 1+ on left leg  No cyanosis.  Extremities warm   Palpable  pedal pulses.   Neurological: alert and oriented x 3.  normal speech and affect, no gross motor deficits  Skin:  No ecchymoses, rashes, or clubbing.    Labs:  CBC RESULTS:  Lab Results   Component Value Date    WBC 11.3 (H) 02/12/2019    RBC 2.89 (L) 02/12/2019    HGB 9.8 (L) 02/12/2019    HCT 29.3 (L) 02/12/2019     (H) 02/12/2019    MCH 33.9 (H) 02/12/2019    MCHC 33.4 02/12/2019    RDW 13.2 02/12/2019     02/12/2019       CMP RESULTS:  Lab Results   Component Value Date     02/20/2019    POTASSIUM 4.0 02/20/2019    CHLORIDE 106 02/20/2019    CO2 27 02/20/2019    ANIONGAP 6 02/20/2019     (H) 02/20/2019    BUN 36 (H) 02/20/2019    CR 1.43 (H) 02/20/2019    GFRESTIMATED 33 (L) 02/20/2019    GFRESTBLACK 38 " (L) 2019    FATOUMATA 8.4 (L) 2019    BILITOTAL 0.4 2019    ALBUMIN 3.1 (L) 2019    ALKPHOS 47 2019    ALT 16 2019    AST 20 2019        INR RESULTS:  Lab Results   Component Value Date    INR 1.12 2019       No components found for: CK  Lab Results   Component Value Date    MAG 2.1 2019     Lab Results   Component Value Date    NTBNP 3,154 (H) 10/29/2018       Most recent echocardiogram:  Recent Results (from the past 8760 hour(s))   Echo limited (Adults Only)    Narrative    [id number]  [id number]  [id number]  [id number]^CHAD^YANDY^JORGE L           [facility],[place]  Echocardiography Laboratory  [address] [place], [place] [address]     Name: RG WALTER  MRN: [id number]  : 1931  Study Date: 2018 11:24 AM  Age: 87 yrs  Gender: Female  [place] [facility] [place]  Reason For Study: Mitral Valve Disorder  Ordering Physician: YANDY BONILLA  Performed By: Elver Smith RDCS     BSA: 1.7 m2  Height: 63 in  Weight: 156 lb  BP: 114/63 mmHg  _____________________________________________________________________________  __        Procedure  Limited Portable Echo Adult.  _____________________________________________________________________________  __        Interpretation Summary  Limited study.  Normal biventricular size and systolic function. The Ejection Fraction is  estimated at 55-60%.  Mild to moderate mitral insufficiency is present.  Mild to moderate tricuspid insufficiency is present.  Estimated pulmonary artery systolic pressure is 26 mmHg plus right atrial  pressure.  Compared to ECHO on 10/9, there are no significant changes. Mitral  regurgitation less prominent compared to recent YOLANDA (10/16).     _____________________________________________________________________________  __        Left Ventricle  Left ventricular size is normal. There is increased LV wall thickness. The  Ejection Fraction is estimated at 55-60%. No  regional wall motion  abnormalities are seen.     Right Ventricle  The right ventricle is normal size. Global right ventricular function is  normal.     Atria  Moderate right atrial enlargement is present. Moderate left atrial enlargement  is present.        Mitral Valve  Mild to moderate mitral insufficiency is present.     Aortic Valve  Trace aortic insufficiency is present.     Tricuspid Valve  Mild to moderate tricuspid insufficiency is present. Estimated pulmonary  artery systolic pressure is 26 mmHg plus right atrial pressure. There are  multiple TR jets.     Pulmonic Valve  The pulmonic valve cannot be assessed.     Vessels  The inferior vena cava is normal. The thoracic aorta cannot be assessed.     Pericardium  No pericardial effusion is present.        Compared to Previous Study  Compared to ECHO on 10/9, there are no significant changes. MR on YOLANDA (10/16)  appears more severe.  _____________________________________________________________________________  __           Doppler Measurements & Calculations  TV max P.5 mmHg  TR max adrina: 252.4 cm/sec  TR max P.5 mmHg     _____________________________________________________________________________  __           Report approved by: Blanca Adams 2018 02:40 PM      ECHO COMPLETE WITH OPTISON    Narrative    [id number]  ECH73  [id number]  [id number]^DINAH^CRYS           [facility],[place]  Echocardiography Laboratory  [address] [place] [id number]     Name: RG WALTER  MRN: [id number]  : 1931  Study Date: 10/09/2018 01:09 PM  Age: 87 yrs  Gender: Female  [place] [facility] [place]  Reason For Study: CHF  History: CHF  Ordering Physician: CRYS NIX  Referring Physician: ERIC ENGLE  Performed By: Sharonda Galo RDCS     BSA: 1.8 m2  Height: 63 in  Weight: 164 lb  BP: 180/87 mmHg  _____________________________________________________________________________  __         Procedure  Complete Portable Echo Adult. Contrast Optison. Optison (NDC #0104-0202-47)  given intravenously. Patient was given 6 ml mixture of 3 ml Optison and 6 ml  saline. 3 ml wasted.  _____________________________________________________________________________  __        Interpretation Summary  Global and regional left ventricular function is normal with an EF of 55-60%.  The right ventricle is not well visualized.  Mild to moderate tricuspid insufficiency is present.  There is mild pulmonary hypertension.  The inferior vena cava was normal in size.  This study was compared with the study from 8/28/14 .  There has been no change.     _____________________________________________________________________________  __        Left Ventricle  Global and regional left ventricular function is normal with an EF of 55-60%.  Left ventricular size is normal. Left ventricular wall thickness is normal.  Left ventricular diastolic function is indeterminate.     Right Ventricle  Likely normal RV function but unable to assess size. The right ventricle is  not well visualized.     Atria  Both atria appear normal. The atrial septum is intact as assessed by color  Doppler .     Mitral Valve  The mitral valve is normal. Mild to moderate mitral insufficiency is present.        Aortic Valve  Aortic valve is normal in structure and function. The aortic valve is  tricuspid. Mild aortic insufficiency is present.     Tricuspid Valve  Mild to moderate tricuspid insufficiency is present. Right ventricular  systolic pressure is 40mmHg above the right atrial pressure. Mild (pulmonary  artery systolic pressure<50mmHg) pulmonary hypertension is present.     Pulmonic Valve  The pulmonic valve is normal.     Vessels  The aorta root is normal. The thoracic aorta is normal. The pulmonary artery  is normal. The inferior vena cava was normal in size with preserved  respiratory variability. Estimated mean right atrial pressure is 3  mmHg  (normal).     Pericardium  No pericardial effusion is present.        Compared to Previous Study  This study was compared with the study from 14 . There has been no  change.     Attestation  I have personally viewed the imaging and agree with the interpretation and  report as documented by the fellow, Parris Duvall, and/or edited by me.  _____________________________________________________________________________  __     MMode/2D Measurements & Calculations  IVSd: 1.1 cm  LVIDd: 3.7 cm  LVIDs: 1.9 cm  LVPWd: 1.0 cm  FS: 47.7 %  LV mass(C)d: 122.1 grams  LV mass(C)dI: 68.7 grams/m2  Ao root diam: 2.7 cm  LA dimension: 4.3 cm  asc Aorta Diam: 3.4 cm  LA/Ao: 1.6  LVOT diam: 2.0 cm  LVOT area: 3.1 cm2  LA Volume (BP): 51.3 ml     LA Volume Index (BP): 28.8 ml/m2  RWT: 0.54        Doppler Measurements & Calculations  MV E max alvarado: 104.0 cm/sec  MV A max alvarado: 33.8 cm/sec  MV E/A: 3.1  MV dec time: 0.14 sec  Ao V2 max: 89.2 cm/sec  Ao max PG: 3.0 mmHg  Ao V2 mean: 66.3 cm/sec  Ao mean P.0 mmHg  Ao V2 VTI: 16.6 cm  RAJ(I,D): 3.5 cm2  RAJ(V,D): 3.2 cm2  LV V1 max PG: 3.3 mmHg  LV V1 max: 90.7 cm/sec  LV V1 VTI: 18.7 cm  SV(LVOT): 58.7 ml  SI(LVOT): 33.1 ml/m2  PA acc time: 0.06 sec  TR max alvarado: 242.0 cm/sec  TR max P.7 mmHg  AV Alvarado Ratio (DI): 1.0  RAJ Index (cm2/m2): 2.0  Medial E/e': 19.5        _____________________________________________________________________________  __        Report approved by: Blanca Adams 10/09/2018 04:28 PM        Coronary Angiogram 19:  RHC  BSA 1.83  1. HR 67 bpm  2. /83/118 mmHg  3. RA 7/8/6   4. RV 46/5  5. PA 46/18/32   6. PCW 16/16/13   7. PA sat 61%   8. PCW sat --%  9. Hgb 12 g/dL   10. Adrienne CO 2.5   11. Adrienne CI 1.4   12. TD CO 2.7   13. TD CI 1.4   14. PVR 7.8      LHC  1. LVEDP 15 mmHg.  2. No gradient across the aortic valve on pull back.  3. 1.0 liter of normal saline was given prior to performing simultaneous LV/RV pressures to evaluate for  restrictive/constrictive physiology given markedly reduced cardiac index of 1.4 by Adrienne and TD. There was equalization of LV and RV end diastolic pressure at 15 mmHg after the fluid challenge with concordant respiratory variability suggestive of restrictive physiology.        Assessment and Plan:    In summary,Linda is doing much better than prior to the mitraclip.  She has been able to ambulate more and we have recommended to her to go slow and steady. She needs to slowly work up to her potential with activity and allow her body time. We also endorsed her good efforts on watching her salt and fluid intake.  We will set up a RT lower extremity US to check for DVT. No medication changes at this time. We discussed with her that for dental procedures (lifelong) we recommend antibiotics prior. She should call the clinic to obtain a script.  1.  HFpEF with restrictive cardiomyopathy  NYHA Class III  Fluid status: euvolemic- Lasix 40 mg in am and 20 mg in pm.  Antiplatelet:  ASA dose 81 mg    Sleep apnea: not addressed today  NSAID use:  Contraindicated.  Avoid use.    2.  Other comordbid conditions:     # Atrial fibrillation   Tachy-lisa syndrome s/p dual chamber pacemaker  Rate controlled, on apixaban at home; held 2/9 in preparation for MitraClip procedure Monday and resumed without bleeding complication.   -Continue Coreg 3.125 mg BID   -Continue apixaban     # Pulmonary fibrosis- followed by PCP. Not sure if she needs to see Pulmonary medicine again. PCP to decide.     # Rheumatoid arthritis - follows with her Rheumatologist she will follow up on question regarding her Folic Acid and also to obtain PA on RA medication.     # CKD baseline Cr 1.5-1.8- continue to monitor.     # HTN- Continue to monitor - inform us if BP goes over 150 Systolic. Hydralazine 10 mg TID and  isosorbide 60mg daily,    # Recurrent UTI's - per Urology last seen 2/2019  -Continue trimethoprim 50 mg at bedtime for UTI prophylaxis. Refill  provided. Will plan to stop this after 3 months.  Continue vaginal estrogen cream twice weekly.  Continue double voiding.  Cranberry supplement such as Ellura or Theracran daily     # Hypothyroidism: Patient is taking levothyroxine 75 mcg daily. Patient is having the following symptoms: none.  - PCP following        TSH   Date Value Ref Range Status   01/18/2019 0.73 0.40 - 4.00 mU/L Final      # Allergic rhinitis: Current medications include cetirizine 10mg once daily and diphenhydramine 25mg rarely.  Pt feels that current therapy is effective.  No current symptoms, typically worse in the spring from pollens. Wondering if she can stop taking cetirizine every day.- PCP address             3.  Follow-up : Dr Loza in one month      Montrell MOELLER CNP  [facility]  Patient seen and discussed with Brook LARA DNP      Patient see and discussed with Montrell Carey NP, agree with above. Discharge Summary notes concern for restrictive cardiomyopathy on her RHC but RA:wedge ratio was 1:2 and she did not have evidence of septal bounce. Defer to Dr Loza whether or not patient should also establish care with heart failure attending.     Brook Reeves DNP, NP-C  2/24/2019

## 2019-02-21 ENCOUNTER — TELEPHONE (OUTPATIENT)
Dept: RHEUMATOLOGY | Facility: CLINIC | Age: 84
End: 2019-02-21

## 2019-02-21 ENCOUNTER — TELEPHONE (OUTPATIENT)
Dept: CARDIOLOGY | Facility: CLINIC | Age: 84
End: 2019-02-21

## 2019-02-21 PROBLEM — M79.89 SWELLING OF RIGHT LOWER EXTREMITY: Status: ACTIVE | Noted: 2019-02-21

## 2019-02-21 NOTE — TELEPHONE ENCOUNTER
Patient daughter called Rheumatology line. Daughter stated that patient was in yesterday for hospital follow up with primary care and was told to check with her Rheumatology doctor to see if she was suppose to stop taking Folic Acid 1 mg.     Huddled with Dr. Davenport and he stated that patient was to stop taking Folic Acid 1 mg.    Called daughter and informed her of message to stop taking Folic Acid 1 mg and daughter verbally understand message.     Yancy Ching MA

## 2019-02-21 NOTE — TELEPHONE ENCOUNTER
Called Xi to notify her that the pt will need abx before dental cleaning/work dt mireille wilson. Xi had no further questions. Amna Casey RN [facility] Care Coordinator

## 2019-02-21 NOTE — TELEPHONE ENCOUNTER
"Per provider, Clotilde, pt ok to take 20/20 of lasix. Reviewed message w daughter. Had no further questions. Amna Casey RN  [facility] Nurse Coordinator          Patient/daughter \"Xi\" left voice mail stated that patient is a little confuse in regards to her Lasix dosage.    Writer returned call a spoke to patient herself, patient stated they increase her Lasix to 40 mg in the morning and 20 mg in the afternoon however, she will prefer to continue with 20 mg in the AM and 20 mg in the afternoon.     Patient stated I have been taking it that way for 10 days now an I am feeling great.    Writer sent a message to [facility] team for advise.Awaiting response.    Marion Gutierrez RN    "

## 2019-02-26 ENCOUNTER — INFUSION THERAPY VISIT (OUTPATIENT)
Dept: INFUSION THERAPY | Facility: CLINIC | Age: 84
End: 2019-02-26
Payer: MEDICARE

## 2019-02-26 VITALS
OXYGEN SATURATION: 100 % | SYSTOLIC BLOOD PRESSURE: 114 MMHG | BODY MASS INDEX: 27.72 KG/M2 | WEIGHT: 162.38 LBS | HEART RATE: 72 BPM | RESPIRATION RATE: 16 BRPM | DIASTOLIC BLOOD PRESSURE: 59 MMHG | HEIGHT: 64 IN | TEMPERATURE: 97.3 F

## 2019-02-26 DIAGNOSIS — M05.79 RHEUMATOID ARTHRITIS INVOLVING MULTIPLE SITES WITH POSITIVE RHEUMATOID FACTOR (H): Primary | ICD-10-CM

## 2019-02-26 DIAGNOSIS — Z53.9 DIAGNOSIS NOT YET DEFINED: Primary | ICD-10-CM

## 2019-02-26 PROCEDURE — 99207 ZZC NO CHARGE LOS: CPT

## 2019-02-26 PROCEDURE — 96365 THER/PROPH/DIAG IV INF INIT: CPT | Performed by: NURSE PRACTITIONER

## 2019-02-26 PROCEDURE — G0179 MD RECERTIFICATION HHA PT: HCPCS | Performed by: INTERNAL MEDICINE

## 2019-02-26 RX ADMIN — Medication 250 ML: at 09:59

## 2019-02-26 ASSESSMENT — PAIN SCALES - GENERAL: PAINLEVEL: NO PAIN (0)

## 2019-02-26 ASSESSMENT — MIFFLIN-ST. JEOR: SCORE: 1148.66

## 2019-02-26 NOTE — PROGRESS NOTES
Infusion Nursing Note:  Lindaterell Spicer presents today for Orencia.    Patient seen by provider today: No   present during visit today: Not Applicable.    Note: N/A.    Intravenous Access:  Peripheral IV placed.    Treatment Conditions:  Biological Infusion Checklist:    ~~~ NOTE: If the patient answers yes to any of the questions below, hold the infusion and contact ordering provider or on-call provider.    1. Have you recently had an elevated temperature, fever, chills, productive cough, coughing for 3 weeks or longer or hemoptysis,  abnormal vital signs, night sweats,  chest pain or have you noticed a decrease in your appetite, unexplained weight loss or fatigue? No  2. Do you have any open wounds or new incisions? No  3. Do you have any recent or upcoming hospitalizations, surgeries or dental procedures? Had Mitral Valve clip 2 weeks ago.  Patient and daughter state they specifically asked about Orencia infusion today with Dr Davenport and Walter.  They both said OK to have Orencia today  4. Do you currently have or recently have had any signs of illness or infection or are you on any antibiotics? No  5. Have you had any new, sudden or worsening abdominal pain? No  6. Have you or anyone in your household received a live vaccination in the past 4 weeks? Please note:  No live vaccines while on biologic/chemotherapy until 6 months after the last treatment.  Patient can receive the flu vaccine (shot only) and the pneumovax.  It is optimal for the patient to get these vaccines mid cycle, but they can be given at any time as long as it is not on the day of the infusion. No  7. Have you recently been diagnosed with any new nervous system diseases (ie. Multiple sclerosis, Guillain [place], seizures, neurological changes) or cancer diagnosis? Are you on any form of radiation or chemotherapy? No  8. Are you pregnant or breast feeding or do you have plans of pregnancy in the future? No  9. Have you been having  any signs of worsening depression or suicidal ideations?  (benlysta only) No  10. Have there been any other new onset medical symptoms? No        Post Infusion Assessment:  Patient tolerated infusion without incident.  Site patent and intact, free from redness, edema or discomfort.  Access discontinued per protocol.    Discharge Plan:     Patient will return 3/26/19 for next appointment.   Patient discharged in stable condition accompanied by: daughter.  Departure Mode: Wheelchair.    Isidra Rojas RN

## 2019-02-26 NOTE — PROGRESS NOTES
"SPIRITUAL HEALTH SERVICES  SPIRITUAL ASSESSMENT Progress Note  [facility]       REFERRAL SOURCE: Follow up SH encounter.     Visited with patient's daughter Xi. Patient was in the infusion clinic, but was sleeping.  Xi states that the family feels that everything is going well and their \"prayers are being answered\".   Linda was able to go through a heart procedure and has been able to breath better and has more energy.  She is asking to be able to do some of her own cooking and laundry again now.  Xi states again that it is difficult for her mom to accept help.  Offered support and prayer.  Xi voiced that they appreciate the SH visits and prayers.     PLAN: The patient and family know that they can request a SH visit and I offered to check in with them periodically for SH support.     Adrianne Gomez  Staff   Pager [phone]  "

## 2019-02-27 ENCOUNTER — MEDICAL CORRESPONDENCE (OUTPATIENT)
Dept: HEALTH INFORMATION MANAGEMENT | Facility: CLINIC | Age: 84
End: 2019-02-27

## 2019-03-04 DIAGNOSIS — I10 HYPERTENSION GOAL BP (BLOOD PRESSURE) < 150/90: ICD-10-CM

## 2019-03-04 RX ORDER — ISOSORBIDE MONONITRATE 60 MG/1
60 TABLET, EXTENDED RELEASE ORAL DAILY
Qty: 30 TABLET | Refills: 0 | Status: SHIPPED | OUTPATIENT
Start: 2019-03-04 | End: 2019-03-13

## 2019-03-04 NOTE — TELEPHONE ENCOUNTER
Patient called requesting a refill of Imdur 60 mg daily to last until she see Dr. Loza in clinic on 3/13.  Rx sent to preferred pharmacy.  Patient informed and verbalized understanding.  Dina Swan RN

## 2019-03-05 ENCOUNTER — TELEPHONE (OUTPATIENT)
Dept: CARDIOLOGY | Facility: CLINIC | Age: 84
End: 2019-03-05

## 2019-03-05 NOTE — TELEPHONE ENCOUNTER
Spoke to patient's daughter, Xi who wants to confirm that patient is not supposed to take amlodipine.  Reviewed patient's chart and amlodipine was stopped on 1/8/19.  Also reviewed recent hospital discharge medications and amlodipine was not listed.  Writer advised for patient to continue not to take amlodipine.  Daughter verbalized understanding.    Please see 1/8/19 telephone encounter.    Dina Swan RN

## 2019-03-05 NOTE — TELEPHONE ENCOUNTER
Reason for call: Medication question  Patient called regarding (reason for call): prescription  Additional comments: Daughter would like to know if patient should still be taking a certain medication or not? Please call to discuss. Not sure of name of medication.    Phone number to reach patient:  Other phone number:  [phone] *    Best Time:  any    Can we leave a detailed message on this number?  YES

## 2019-03-08 DIAGNOSIS — I10 HYPERTENSION GOAL BP (BLOOD PRESSURE) < 150/90: ICD-10-CM

## 2019-03-08 NOTE — TELEPHONE ENCOUNTER
Pending Prescriptions:                       Disp   Refills    isosorbide mononitrate (IMDUR) 60 MG 24 h*30 tab*0            Sig: Take 1 tablet (60 mg) by mouth daily    Last Visit 11/27/18 w/Minna SESAY  Last Filled   Quantity 90 day supply  Req. Received 3/8/19  SETH Eden

## 2019-03-09 ENCOUNTER — NURSE TRIAGE (OUTPATIENT)
Dept: NURSING | Facility: CLINIC | Age: 84
End: 2019-03-09

## 2019-03-11 RX ORDER — ISOSORBIDE MONONITRATE 60 MG/1
60 TABLET, EXTENDED RELEASE ORAL DAILY
Qty: 30 TABLET | Refills: 0 | Status: CANCELLED | OUTPATIENT
Start: 2019-03-11

## 2019-03-11 NOTE — TELEPHONE ENCOUNTER
rx sent on 3/4/19 to same pharmacy with a confirmation that it was received.  Will call the pharmacy to clarify. Dina Swan RN

## 2019-03-13 ENCOUNTER — ANCILLARY PROCEDURE (OUTPATIENT)
Dept: CARDIOLOGY | Facility: CLINIC | Age: 84
End: 2019-03-13
Attending: INTERNAL MEDICINE
Payer: MEDICARE

## 2019-03-13 ENCOUNTER — OFFICE VISIT (OUTPATIENT)
Dept: CARDIOLOGY | Facility: CLINIC | Age: 84
End: 2019-03-13
Attending: NURSE PRACTITIONER
Payer: MEDICARE

## 2019-03-13 ENCOUNTER — TELEPHONE (OUTPATIENT)
Dept: FAMILY MEDICINE | Facility: CLINIC | Age: 84
End: 2019-03-13

## 2019-03-13 VITALS
WEIGHT: 160.8 LBS | HEART RATE: 71 BPM | DIASTOLIC BLOOD PRESSURE: 73 MMHG | OXYGEN SATURATION: 98 % | BODY MASS INDEX: 28.03 KG/M2 | SYSTOLIC BLOOD PRESSURE: 125 MMHG

## 2019-03-13 DIAGNOSIS — I34.0 MITRAL VALVE INSUFFICIENCY, UNSPECIFIED ETIOLOGY: ICD-10-CM

## 2019-03-13 DIAGNOSIS — I34.0 NON-RHEUMATIC MITRAL REGURGITATION: ICD-10-CM

## 2019-03-13 DIAGNOSIS — I50.30 CONGESTIVE HEART FAILURE WITH PRESERVED LV FUNCTION, NYHA CLASS 3 (H): ICD-10-CM

## 2019-03-13 DIAGNOSIS — Z95.818 S/P MITRAL VALVE CLIP IMPLANTATION: ICD-10-CM

## 2019-03-13 DIAGNOSIS — Z98.890 S/P MITRAL VALVE CLIP IMPLANTATION: ICD-10-CM

## 2019-03-13 DIAGNOSIS — I50.30 HEART FAILURE WITH PRESERVED EJECTION FRACTION (H): ICD-10-CM

## 2019-03-13 DIAGNOSIS — I50.30 (HFPEF) HEART FAILURE WITH PRESERVED EJECTION FRACTION (H): ICD-10-CM

## 2019-03-13 LAB
ANION GAP SERPL CALCULATED.3IONS-SCNC: 7 MMOL/L (ref 3–14)
BUN SERPL-MCNC: 42 MG/DL (ref 7–30)
CALCIUM SERPL-MCNC: 9.2 MG/DL (ref 8.5–10.1)
CHLORIDE SERPL-SCNC: 104 MMOL/L (ref 94–109)
CO2 SERPL-SCNC: 30 MMOL/L (ref 20–32)
CREAT SERPL-MCNC: 1.76 MG/DL (ref 0.52–1.04)
ERYTHROCYTE [DISTWIDTH] IN BLOOD BY AUTOMATED COUNT: 14 % (ref 10–15)
GFR SERPL CREATININE-BSD FRML MDRD: 25 ML/MIN/{1.73_M2}
GLUCOSE SERPL-MCNC: 87 MG/DL (ref 70–99)
HCT VFR BLD AUTO: 34 % (ref 35–47)
HGB BLD-MCNC: 10.9 G/DL (ref 11.7–15.7)
MCH RBC QN AUTO: 32.5 PG (ref 26.5–33)
MCHC RBC AUTO-ENTMCNC: 32.1 G/DL (ref 31.5–36.5)
MCV RBC AUTO: 102 FL (ref 78–100)
PLATELET # BLD AUTO: 243 10E9/L (ref 150–450)
POTASSIUM SERPL-SCNC: 3.9 MMOL/L (ref 3.4–5.3)
RBC # BLD AUTO: 3.35 10E12/L (ref 3.8–5.2)
SODIUM SERPL-SCNC: 141 MMOL/L (ref 133–144)
WBC # BLD AUTO: 6.4 10E9/L (ref 4–11)

## 2019-03-13 PROCEDURE — 93306 TTE W/DOPPLER COMPLETE: CPT | Performed by: INTERNAL MEDICINE

## 2019-03-13 PROCEDURE — 99215 OFFICE O/P EST HI 40 MIN: CPT | Performed by: INTERNAL MEDICINE

## 2019-03-13 PROCEDURE — 85027 COMPLETE CBC AUTOMATED: CPT | Performed by: INTERNAL MEDICINE

## 2019-03-13 PROCEDURE — 36415 COLL VENOUS BLD VENIPUNCTURE: CPT | Performed by: INTERNAL MEDICINE

## 2019-03-13 PROCEDURE — 80048 BASIC METABOLIC PNL TOTAL CA: CPT | Performed by: INTERNAL MEDICINE

## 2019-03-13 RX ORDER — FUROSEMIDE 20 MG
20 TABLET ORAL 2 TIMES DAILY
Qty: 270 TABLET | Refills: 3 | COMMUNITY
Start: 2019-03-13 | End: 2019-03-28

## 2019-03-13 RX ORDER — ISOSORBIDE MONONITRATE 60 MG/1
60 TABLET, EXTENDED RELEASE ORAL DAILY
Qty: 90 TABLET | Refills: 0 | Status: SHIPPED | OUTPATIENT
Start: 2019-03-13 | End: 2019-04-05

## 2019-03-13 ASSESSMENT — PAIN SCALES - GENERAL: PAINLEVEL: NO PAIN (0)

## 2019-03-13 NOTE — TELEPHONE ENCOUNTER
This is a Dr. Loza patient, I would assume she should continue Imdur but it was not mentioned in his office visit note from today.  Will route to his team to address refill.  Pharmacy/patient requesting 90 day supply.    Dina Swan RN

## 2019-03-13 NOTE — PROGRESS NOTES
CARDIOLOGY CLINIC FOLLOW UP    Referring Provider:  Established Patient  Primary Care Provider:   Marianne Basurto    HPI: Linda Spicer is a 87 year old female who returns to the cardiology clinic for ongoing evaluation of HFpEF and recent paroxysmal atrial flutter.  The patient's risk factor profile is: (+) HTN, (-) diabetes, (-) hyperlipidemia, (-) tobacco use, (+) family Hx CAD [mother, sister]. The patient was diagnosed with HFpEF in Oct 2014.  She had a dobutamine ECHO (Oct 2014) that was normal.  She had a RHC in Oct 2014 that showed normal hemodynamics at baseline although the CO was mildly depressed.  With fluid challenge, the left sided filling pressures dramatically increased. The results of her prior dobutamine ECHO and RHC are noted below; she has not undergone coronary angiography.    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 She had PFTs in Aug 2015 that showed moderate lung diffusion defect.  She did not desaturate after walking 6 minutes.  Inhalers have not provided any benefit.     She presented in August 2016 with chest pain, dizziness, and lightheadedness.  She was in atrial flutter at that time but converted back to NSR while in the ER.  She was started on metoprolol XL 12.5 mg daily, losartan was decreased to 50 mg daily, and spironolactone 25 mg qd was continued.  Her ECG did not show ischemic changes and her troponins were negative.  She was treated on heparin but anticoagulation was deferred and she was given ASA 81 mg qd.  She was switched from ASA to Eliquis in Nov 2016.    In Feb 2017 she was started to have recurrent atrial flutter.  She was treated with higher dose of beta blocker but developed bradycardia and near syncope and her beta blocker was stopped.  She returned to [facility] with tachycardia and a pacemaker was placed and she was restarted on Toprol XL 12.5 mg qd.  Her losartan was decreased to 25 mg qd and her  spironolactone was continued at 25 mg qd.  She was started on Propofenone at that time.    She was seen at [facility] ER in July 2017 with chest pain.  It had been occurring in the setting of resuming an exercise program and the left sided chest pain was attributed to musculoskeletal pain. ECG showed atrial pacing.  .  Troponin < 0.045. CXR negative.    She returned to the ER in Aug 2017 after mixing up her medications.  Her Toprol XL had been increased to 25 mg qd but she had accidentally taken Losartan 50 mg qd instead of 25 mg qd.  She noticed HR had increased to 100-110 and she had lightheadedness.  She was returned to Toprol XL 12.5 mg qd and Losartan 25 mg qHS, and Spironolactone 25 mg qd.    She had recurrent ER visits in Nov 2017, Jan 2018, and most recently Feb 2018.  She felt palpitations, lightheadedness, and shortness of breath.  She believes she was in atrial flutter on each occasion. Her pacemaker was interrogated on 2/21/18.  Data shows 5 AT/AF episodes indicating atrial arrhythmias. This equals 7% of the time since 1/15/18. Longest episodes listed is 3 days with atrial rates up to >400bpm. Current episode appears to have started today, 2/21/18, at 0517. Vrates during AT/AF are <110bpm about 95%. Patient has a hx of PAF.  Data shows 3 non-sustained VT episodes, 1-3sec, Vrates 162-175bpm, all episodes were 1/24/18@1044 and were actually the same episode of NSVT lasting ~6 sec. Atrial pacing at 72.1%.  Ventricular pacing at 5.1%.  Presenting rhythm is AS- (AF).    She was discharged from the ER on Apixaban 2.5 BID and Toprol XL 25 mg qd.  Her home BP have been 116-192/58-93 mm Hg and HR 60 - 66.  Her Losartan, previously 25 mg qAM, was switch to 50 mg qHS.      She was admitted to [facility] in June 2018 with fractured coccyx following a fall and was in atrial fibrillation at that time by her report. She was subseqeuntly started on Rhythmol and was cardioverted and continued on Rhythmol (July 2018).    She  "was seen by Dr. Comer in Feb 2018.  The six-minute walk distance (600 ft) is reduced. Her O2 saturation was 96%.  Mild restriction defect.  Mild diffusion defect. The diffusing capacity was not corrected for the patient's hemoglobin.  She remains compliant with her medications.  Of note, she had ABP with pH 7.50 and pCO2 30 confirming hyperventilation in 2014.    She had a fall in July 2018 after experiencing a fall with fractured S3.  She has been using a walker to ambulate and a wheelchair when necessary.      She presented to [facility] on Sept 13, 2018 with exhaustion.  She had SOB but this was unchanged from baseline.  She suspected she was back in AF so she contacted the EP nurses. Pacemaker interrogation showed presenting rhythm AF/ @ 60 bpm. Normal device function. AP= 0% and = 81%. No short V-V intervals recorded.  She underwent elective cardioversion by EP and was discharged to home that day.    She believes she was feeling better on 9/15, less fatigued, \"a new person\".  She awoke, feeling worse, and suspected she was in AF.  She had repeat interrogation of the pacer, confirming AF.  She underwent subsequent cardioversion. Propafenone failed to maintain SR, Multaq was cost prohibitive. She has limited AAT options given variable renal function (prefer to avoid Sotalol or dofetilide). She was started on amiodarone 9/19/2018 and underwent a successful DCCV 10/11/2018, 10/16/208.  Device check 11/26/2018 showed 74% AF/AFL burden.  Multiple device reports show that she had been AF/AFL for several days at a time without knowledge or symptoms. She also has rheumatoid pulmonary fibrosis which may be contributing to her worsening symptoms and as the amiodarone has not been successful in maintaining SR will discontinue the amiodarone and get a PFT to assess for worsening lung function.     Over the course of the past several months she has had recurrent hospitalizations for CHF.  She was hospitalized twice in Oct " 2018 and twice in Nov 2018.  During that time, her Losartan was discontinued and she was placed on Imdur and Losartan.  Over the course of the past several months she was found to have progressive mitral regurgitation and ultimately underwent right heart catheterization, coronary angiography, and left heart catheterization.  She has normal right and left sided filling pressures, reduced cardiac output, restrictive physiology, and mild CAD with <50% disease in the RCA and LAD vessels.  She underwent MitraClip (2/11/19).  A repeat ECHO the following day showed gradient 5 mm Hg and only trace MR.  She was discharged on Lasix 20 BID but had progressive edema and increased her to 40 mg qAM / 20 mg qPM.      It has been a month since the Mitraclip was placed.  She denies chest pain.  She has noted marked increase in exercise tolerance.  She continues to walk with a walker and has a physical therapist that comes to see her.  She can walk 150 years before developing SOB.  She has not tried to walk up stairs yet.  She denies PND and orthopnea but denies pedal edema.  She denies palpitations, lightheadedness, and syncope.     Her BPs have been 120-140 / 50 - 70 mm Hg.      PAST MEDICAL HISTORY:  Past Medical History:   Diagnosis Date     Adjustment disorder with anxious mood 5/18/2015     Advanced directives, counseling/discussion 8/30/2012    Patient states has Advance Directive and will bring in a copy to clinic. 8/30/2012   Tevin May  [facility] Medical Assistant \       Anemia 9/25/2015     Basal cell cancer      CHF (congestive heart failure) (H) 9/18/2014     CKD (chronic kidney disease) stage 3, GFR 30-59 ml/min (H) 9/29/2015     DDD (degenerative disc disease), lumbar 9/25/2015     Diffuse idiopathic pulmonary fibrosis (H) 5/6/2013     Encounter for palliative care 5/18/2015     History of blood transfusion 9/29/2015     Hypertension goal BP (blood pressure) < 140/90 9/7/2012     Hypothyroid 9/7/2012     Irritable  bowel syndrome 10/29/2013     Macular degeneration      Macular degeneration, 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     Nondisplaced spiral fracture of shaft of humerus      Osteoporosis 8/13/2013     Imo Update utility     RA (rheumatoid arthritis) (H) 5/7/2013     Rheumatic fever      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 2/20/2019     Shingles      Spinal stenosis of lumbar region with neurogenic claudication 9/14/2015       CURRENT MEDICATIONS:  Current Outpatient Medications   Medication Sig Dispense Refill     apixaban ANTICOAGULANT (ELIQUIS) 2.5 MG tablet Take 1 tablet (2.5 mg) by mouth 2 times daily Stop taking 2/9 (last dose evening of Feb 8, 2019) in preparation for MitraClip Procedure on Monday 60 tablet 0     aspirin (ASA) 81 MG EC tablet Take 1 tablet (81 mg) by mouth daily 90 tablet 0     denosumab (PROLIA) 60 MG/ML SOLN injection Inject 1 mL (60 mg) Subcutaneous every 6 months 1 mL      diphenhydrAMINE (BENADRYL) 25 MG tablet Take 25 mg by mouth as needed Patient takes 25-50mg 1-2 times a day.       furosemide (LASIX) 20 MG tablet 40mg by mouth every morning and 20mg by mouth every afternoon 270 tablet 3     hydrALAZINE (APRESOLINE) 10 MG tablet Take 1 tablet (10 mg) by mouth 3 times daily 90 tablet 3     isosorbide mononitrate (IMDUR) 60 MG 24 hr tablet Take 1 tablet (60 mg) by mouth daily 30 tablet 0     order for DME Dispense SP Magdy 1 each 0     order for DME Equipment being ordered: Dispense baffle, for use with nebulizer. 1 each 0     order for DME Equipment being ordered: Nebulizer. Use with Albuterol. 1 each 0     order for DME Equipment being ordered: Dispense face mask.  Mrs. Spicer is immunosuppressed due to rheumatoid arthritis. 1 Box 11     ranitidine (ZANTAC) 150 MG tablet Take 1 tablet (150 mg) by mouth At Bedtime       trimethoprim (TRIMPEX) 100 MG tablet Take 0.5 tablets (50 mg) by mouth daily 45 tablet 0     albuterol (PROVENTIL) (2.5 MG/3ML) 0.083% neb solution Take 1 vial (2.5 mg) by  nebulization every 6 hours as needed for shortness of breath / dyspnea or wheezing 360 mL      azaTHIOprine (IMURAN) 50 MG tablet Take 1 tablet (50 mg) by mouth daily (Patient taking differently: Take 50 mg by mouth every evening ) 30 tablet 0     carvedilol (COREG) 3.125 MG tablet Take 1 tablet (3.125 mg) by mouth 2 times daily (with meals) 60 tablet 0     levothyroxine (SYNTHROID/LEVOTHROID) 75 MCG tablet Take 1 tablet (75 mcg) by mouth daily 30 tablet 0     nitroGLYcerin (NITROSTAT) 0.4 MG sublingual tablet Place 1 tablet (0.4 mg) under the tongue every 5 minutes as needed for chest pain 25 tablet 11       PAST SURGICAL HISTORY:  Past Surgical History:   Procedure Laterality Date     ANESTHESIA CARDIOVERSION N/A 9/14/2018    Procedure: ANESTHESIA CARDIOVERSION;;  Surgeon: GENERIC ANESTHESIA PROVIDER;  Location: [facility] OR     ANESTHESIA CARDIOVERSION N/A 10/11/2018    Procedure: ANESTHESIA CARDIOVERSION;  Cardioversion;  Surgeon: GENERIC ANESTHESIA PROVIDER;  Location: [facility] OR     ANESTHESIA CARDIOVERSION N/A 10/16/2018    Procedure: Anesthesia Cardioversion ;  Surgeon: GENERIC ANESTHESIA PROVIDER;  Location: [facility] OR     APPENDECTOMY       BIOPSY      hemorrhoidectomy     CV HEART CATHETERIZATION WITH POSSIBLE INTERVENTION N/A 1/31/2019    Procedure: CORS,LHC,RHC-YOLANDA BEFORE CATH APPOINTMENT;  Surgeon: Avila Watson MD;  Location:  HEART CARDIAC CATH LAB     ENT SURGERY      tonsillectomy     GYN SURGERY      3 D & C's     HYSTERECTOMY, PAP NO LONGER INDICATED       LAMINECTOMY LUMBAR ONE LEVEL N/A 10/13/2015    Procedure: LAMINECTOMY LUMBAR ONE LEVEL;  Surgeon: Fransico Toussaint MD;  Location: [facility] OR       ALLERGIES  Cephalexin hcl; Gabapentin; Naproxen; Perfume; Macrobid [nitrofurantoin anhydrous]; Seasonal allergies; Sulfa drugs; Xanax [alprazolam]; and Ciprofloxacin    FAMILY HX:  Family History   Problem Relation Age of Onset     Hypertension Mother      Psychotic Disorder Father      Diabetes Son       Diabetes Daughter      Blood Disease Daughter        SOCIAL HX:  History     Social History     Marital Status:      Spouse Name: N/A     Number of Children: N/A     Years of Education: N/A     Social History Main Topics     Smoking status: Never Smoker      Smokeless tobacco: Never Used     Alcohol Use: Yes      Comment: rare wine      Drug Use: No     Sexual Activity: No     Other Topics Concern     None     Social History Narrative    . Has six children. She enjoys bridge and gene[place]mergey. Her  has cancer. Her son has financial and alcohol issues.       ROS:  Constitutional: No fever, chills, or sweats. No weight gain/loss.   HEENT: No visual disturbance, ear ache, epistaxis, sore throat.   Allergies/Immunologic: Negative.   Respiratory:(-) cough, (-) hemoptysis.   Cardiovascular: As per HPI.   GI: No nausea, vomiting, hematemesis, melena, or hematochezia.   : No urinary frequency, dysuria, or hematuria.   Integument: No rash.   Psychiatric: No anxiety / depression.   Neuro: No speech disturbance, focal sensory or motor deficit.   Endocrinology: No polyuria / polyphagia.   Musculoskeletal: No myalgia.    VITAL SIGNS:  /73 (BP Location: Left arm, Patient Position: Chair, Cuff Size: Adult Regular)   Pulse 71   Wt 72.9 kg (160 lb 12.8 oz)   LMP  (LMP Unknown)   SpO2 98%   BMI 28.03 kg/m     Body mass index is 28.03 kg/m .  Wt Readings from Last 2 Encounters:   02/26/19 73.7 kg (162 lb 6 oz)   02/20/19 73.5 kg (162 lb)       PHYSICAL EXAM  Linda Spicer is a 87 year old female.in no acute distress.  HEENT: Eyes Nonicteric.  Neck: JVP 2 cm H2O, at clavicle. .  Carotids +3/3 bilaterally without bruits.  Lungs: Fine, dry, bilateral crackles about 1/2 the way up, unchanged. Cor RRR. Normal S1 and S2.  No murmur, rub, or gallop.  PMI in Lf 5th ICS.  Abd: Soft, nontender, nondistended.  NABS.  No pulsatile mass.  Extremities: No C/C.  SANDRA stockings, no edema. .  Pulses +3/3 symmetric in  upper and lower extremities.  Neuro: Grossly intact.  Psych: A&O x 3.  Skin: No rash.    LABS  None    Recent Labs   Lab Test  17   1214  16   0756  14   0821   CHOL  146  171  155   HDL  50  47*  42*   LDL  76  109*  97   TRIG  101  75  84   CHOLHDLRATIO   --    --   3.7     EK18  Atrial fibrillation, mostly vent paced rate 60.  One  supraventricular beat conducts through AV node system.    EK18  Atrial paced rhythm, prolonged conduction.  Nonspecific ST-T wave changes.    EK16  SB at 54.  Intervals: 0.17/0.076/0.404.  Isolated Q wave in III.  No other abnormalities.    EK/7/15  SB at 49.  Q waves in III and aVF.  No change from ECG () by report but I am not able to locate this or any other ECGs.    TTE 19 (post bobbi-clip)   Normal biventricular function.  Post MitraClip placement. Clip is well seated with trace mitral regurgitation,  mean gradient is 5 mmHg.  The inferior vena cava was normal in size with preserved respiratory variability.  No pericardial effusion.     BOBBI CLIP   19  PROCEDURES PERFORMED:   1. Successful transcatheter mitral valve repair using the MitraClip device.  2. Left atrial catheterization  3. Trans-septal puncture  4. Venotomy closure.      ECHO 19:  Normal left and right ventricular size and function.  Mitral insufficiency appears mild in severity.  Pulmonary artery systolic pressure is normal.  The inferior vena cava was normal in size with preserved respiratory  variability.  Estimated mean right atrial pressure is 3 mmHg (normal).     Compared to the last transthoracic study on 19 the MR looks essentially  Unchanged.     YOLANDA   19  Interpretation Summary  Global and regional left ventricular function is normal with an EF of 55-60%.  Global longitudinal strain is -21.8% (normal is less than -18.0%)  Mitral valve with degenerative changes and there is moderate to severe  regurgitation.  Regurgitation  primarily from mid and medial scallops. ERO 0.32 cm2 and RVol 59  mL (PISA). Systolic flow reversal noted in 1 out of 4 pulmonary veins (LLPV).     Compared to TTE study from 1/16/19, mitral regurgitation is worse.    ECHO  1/16/19  Global and regional left ventricular function is normal with an EF of 60-65%.  Right ventricular function, chamber size, wall motion, and thickness are  Normal. The inferior vena cava is normal. No pericardial effusion is present.       Transesophageal echocardiogram 10/16/18  The left atrial appendage is normal. It is free of spontaneous echo contrast and thrombus.  No transgastric images obtained due to patient discomfort.     The Ejection Fraction is estimated at 55-60%.  Global right ventricular function is normal.  Moderate to severe mitral insufficiency is present.  Moderate tricuspid insufficiency is present.  Multiple MR jets noted. There is blunted systolic forward flow in 2 of the pulmonary veins. MR volume is estimated at 34ml, however this likely underestimates MR due to multiple jets.     Compared to prior TTE on 10/9/2018, MR appears to be worse.        C  1/31/19:  BSA 1.83  1. HR 67 bpm  2. /83/118 mmHg  3. RA 7/8/6   4. RV 46/5  5. PA 46/18/32   6. PCW 16/16/13   7. PA sat 61%   8. PCW sat --%  9. Hgb 12 g/dL   10. Adrienne CO 2.5   11. Adrienne CI 1.4   12. TD CO 2.7   13. TD CI 1.4   14. PVR 7.8      [facility]   1/31/19  1. LVEDP 15 mmHg.  2. No gradient across the aortic valve on pull back.  3. 1.0 liter of normal saline was given prior to performing simultaneous LV/RV pressures to evaluate for restrictive/constrictive physiology given markedly reduced cardiac index of 1.4 by Adrienne and TD. There was equalization of LV and RV end diastolic pressure at 15 mmHg after the fluid challenge with concordant respiratory variability suggestive of restrictive physiology.     CORONARY ANGIOGRAM:   1. Both coronary arteries arise from their respective cusps.  2. Dominance: Right  3. The  LM is without angiographic evidence of disease.   4. LAD: Type 3 [LAD supplies the entire apex].. The LAD gives rise to septal perforators.  The proximal LAD has an ostial 40-50% lesion. The mid LAD has a 40-50% lesion. Distal and apical LAD is small and tortuous.  5. LCX gives rise to single large OM, tortuous.  The proximal LCx has minimal luminal irregularities. The OM branch has mild 30% proximal disease. It is otherwise a tortuous vessel.  6. RCA gives rise to PL branches and supplies PDA. The RCA has a mid lesion with 40% stenosis, long segment, and calcified. There is a proximal 30% calcified lesion. The distal RCA has minimal disease including its branches.  7. There is a large Ramus branch with 30-40% ostial disease.      DOBUTAMINE ECHO STRESS TEST:  8/26/14  Interpretation Summary  The EKG portion of this stress test was negative for inducible ischemia (see echo results below). Normal resting wall motion and no stress-induced wall motion abnormality.    Baseline  Normal baseline electrocardiogram.  Normal left ventricular wall motion.    Stress  The maximum dose of dobutamine was 20mcg/kg/min.  Target Heart Rate was achieved.  The EKG portion of this stress test was negative for inducible ischemia (see echo results below).  Arrhythmia induced during stress: occasional PVC's and PAC's.  Normal resting wall motion and no stress-induced wall motion abnormality.    Left Ventricle  There is mild concentric left ventricular hypertrophy.  Left ventricular systolic function is normal.    Right Ventricle  The right ventricle is normal in size and function.    Atria  The left atrium is moderate to severely dilated.    Mitral Valve  There is mild (1+) mitral regurgitation.    Tricuspid Valve  There is mild (1+) tricuspid regurgitation.  The right ventricular systolic pressure is approximated at 39 mmHg plus the   right atrial pressure.    Aortic Valve  There is trace aortic  regurgitation.    Pericardial/Pleural  There is no pericardial effusion.      RIGHT HEART CATH: 9/22/14  RA 3  RV 36/3  PA 36/13 (mean 21)  PCW 10  Fluid challenge 500 ml NS --> PA 46/18 (mean 27 mm Hg), PCW 18 with prominent v waves    ECHO: 5/26/2016   1.  LV function is normal. The visually estimated ejection fraction is 65%.   2. No regional wall motion abnormalities.   3. Moderately enlarged right ventricle with mild decreased RV functions.   4. Diastolic filling consistent with diastolic dysfunction.   5. Moderate mitral valve regurgitation.   6. Moderately dilated left atrium.   7. Pulmonary hypertension is present.   8. Moderately elevated pulmonary pressure estimated at 40.0 mmHg plus right atrial pressure.    LEXISCAN:  5/27/16  1. Typical Lexiscan induced chest discomfort.  2. Typical symptoms with Lexiscan infusion; probable adequate vasodilation response.  3. Normal stress EKG.  4. Abnormal baseline blood pressure 207/71.  5. Ejection fraction 76%.  6. No discrete regional wall motion abnormalities are identified.  7. Normal myocardial perfusion with no evidence for focal myocardial ischemia or myocardial infarction.      PFTs  (8/21/15)  The six-minute walk distance is normal.  There is no significant desaturation during the six-minute walk done on room air.  No significant airflow obstruction.  Moderate Diffusion Defect   Compared with testing from 1/15/2015 there has been an increase in the FVC.    CORONARY ANGIOGRAPHY:  None    ARTERIAL US LLE:  12/2/17  No evidence of significant arterial occlusive disease in the left leg.    CT chest w/o contrast (2/20/2017)  IMPRESSION:   Interval increase in peripheral lower lobe predominant fibrotic  changes with traction bronchiectasis in a UIP pattern. Redemonstrated  honeycombing with interval enlargement of cystic change.    ASSESSMENT AND PLAN:   1. Heart Failure, preserved LVEF.  The patient's CHF is NYHA Class II-III.   ACC/AHA Stage C.  Overall, she is  markedly improved since the MitraClip.  She is currently on Imdur and Hydralazine.  I would have liked to return her to Losartan but her renal function has deteriorated in the past 3 weeks with Cr risking from 1.43 to 1.76.   Over the same time frame her weight has dropped from 73.4 kg to 72.9 kg.  We will repeat the ECHO today to ensure the MR has not worsened following the MitraClip.  I will transiently reduce the Lasix from 40 mg qAM/20 mg qPM to 20 mg BID for the next week and repeat the labs in one week. She will remain on 2 Liter fluid restriction and 2 gm Na restriction.    Avoid added salt and processed foods.  Continue with light exercise.  Given her multiple hospitalizations, I would like to have her follow up in [facility] clinic.  I would also consider Cardiomems as potential way of monitoring her PA pressures.  Continue SANDRA stockings.    2. Atrial Flutter, Paroxysmal.  I would equate paroxysmal atrial flutter to PAF in regard to risk of stroke or systemic embolism.   The patient has a CHADS2-Vasc score of 4, corresponding to a 4.0% annual stroke / systemic [facility] event rate. The patient has Stage III CKD with GFR approaching 30 ml/min.  She has been seen by EP and antiarrhythmic therapy has not been effective.  There was also a concern of worsening pulmonary fibrosis.  She was considered to be high risk for AF ablation and she is now deemed permanent AF with rate control strategy with Coreg.  She is anticoagulated on Eliquis but may ultimately have to be switched to coumadin if renal function worsens.  Last device check (Feb 5, 2019) showed AP/ with 50% AF burden.    3. Rheumatoid Pulmonary Fibrosis.  Per Dr. Comer, she had severe dyspnea and hyperventilation in the past now showing significant improvement in ventilatory control and symptoms and improvement in pulmonary function and exercise tolerance.  There was a concern that patient was destaturating but she did not do so on 6 minute walk test.  She  is up to date on vaccinations and flu shots.  F/U with pulmonary medicine at [place].      4. Hypertension.  Well controlled on combination of Toprol, and Hydralazine.  Restrict Na.  Sporadic BPs in 160s.  Monitor BP and if persistent SBP> 140, adjust Rx.    FOLLOW UP:  2-3 weeks    Emeterio Loza MD    Divisions of Cardiology  [place], [place]    CC  ESTABLISHED PATIENT

## 2019-03-13 NOTE — TELEPHONE ENCOUNTER
Filomena returned call    (Agency, skill nursing - PT/OT - discharge early - done)    Best number to reach caller: Other phone number:  [phone]    Is it ok to leave a detailed message: YES

## 2019-03-13 NOTE — TELEPHONE ENCOUNTER
Patient's daughter called and is requesting refill for Imdur.  Imdur refilled and advised to get more refills at time of visit with Eric Engle.  Daughter, Xi verbalized understanding.    Jose Swan RN    Signed Prescriptions:                        Disp   Refills    isosorbide mononitrate (IMDUR) 60 MG 24 hr*90 tab*0        Sig: Take 1 tablet (60 mg) by mouth daily  Authorizing Provider: ERIC ENGLE  Ordering User: JOSE SWAN

## 2019-03-13 NOTE — TELEPHONE ENCOUNTER
This writer attempted to contact Filomena on 03/13/19      Reason for call: what discipline discharging from and unable to leave message.      If patient/Filomena/home care calls back: call center please ask what discipline home care requesting a discharge from. Then route message to [facility].         Regla Escalona RN      This writer returned phone call to Filomena. Voicemail did not have indication that it was for a home care place and did not indicate that it was a confidential voicemail so did not leave patient identifiers on voicemail.      Need to know what discipline home care is requesting early discharge from before approving discharge.      Regla Escalona RN

## 2019-03-13 NOTE — PATIENT INSTRUCTIONS
It was a pleasure to see you in the cardiology clinic today.    If you have any questions, you can reach my nurse, Stacey Perez, at [phone].     Note the new medications: None    Stop the following medications: None    Reduce Lasix to 20 mg twice a day for the next 2 week.    Recheck Basic Metabolic Panel in one week.    The results from today include: None    Tests ordered today: None    I would like you to follow up with [facility] clinic in next 2 weeks.      Sincerely,      Emeterio Loza MD     [facility]

## 2019-03-13 NOTE — NURSING NOTE
Linda Spicer's goals for this visit include: Return  She requests these members of her care team be copied on today's visit information: Yes    PCP: Tre Lockett    Referring Provider:  Avila Watson MD  [address] [place] [id number]    /73 (BP Location: Left arm, Patient Position: Chair, Cuff Size: Adult Regular)   Pulse 71   Wt 72.9 kg (160 lb 12.8 oz)   LMP  (LMP Unknown)   SpO2 98%   BMI 28.03 kg/m      Do you need any medication refills at today's visit? No    Stacey Birch CMA on 3/13/2019 at 9:31 AM

## 2019-03-13 NOTE — TELEPHONE ENCOUNTER
Reason for Call:  Other Discharge     Detailed comments: Filomena called from [facility] asking for early discharge. Linda is doing great hoping to discharge this week.     Phone Number Patient can be reached at: [phone]    Best Time: Any    Can we leave a detailed message on this number? YES    Call taken on 3/13/2019 at 11:01 AM by Melisa Dunn

## 2019-03-14 NOTE — TELEPHONE ENCOUNTER
Routing update to provider. Disregard previous request for discharging from home care early - home care will continue services for another two weeks.     Ana Arzate RN

## 2019-03-14 NOTE — TELEPHONE ENCOUNTER
Routing to provider to review and advise if the below services are appropriate for an early discharge since patient is fairing well.     Ana Arzate RN

## 2019-03-14 NOTE — TELEPHONE ENCOUNTER
Reason for Call:  Other     Detailed comments: Filomena called from [facility] she said she will not be discharging the patient for another two weeks. Due to changes and medication after appointment yesterday.    Phone Number Patient can be reached at: [phone]    Best Time: asnytime    Can we leave a detailed message on this number? YES    Call taken on 3/14/2019 at 9:45 AM by Leonarda Gonzalez

## 2019-03-18 ENCOUNTER — PATIENT OUTREACH (OUTPATIENT)
Dept: CARE COORDINATION | Facility: CLINIC | Age: 84
End: 2019-03-18

## 2019-03-18 DIAGNOSIS — K58.9 IRRITABLE BOWEL SYNDROME, UNSPECIFIED TYPE: ICD-10-CM

## 2019-03-18 RX ORDER — AMOXICILLIN 250 MG
1 CAPSULE ORAL DAILY PRN
Qty: 100 TABLET | Refills: 3 | Status: SHIPPED | OUTPATIENT
Start: 2019-03-18

## 2019-03-18 ASSESSMENT — ACTIVITIES OF DAILY LIVING (ADL): DEPENDENT_IADLS:: COOKING;CLEANING;LAUNDRY;SHOPPING;MEDICATION MANAGEMENT;MONEY MANAGEMENT;TRANSPORTATION

## 2019-03-18 NOTE — TELEPHONE ENCOUNTER
Routing refill request to provider for review/approval because:  Drug not active on patient's medication list        Carmenza Miller RN, BSN

## 2019-03-18 NOTE — TELEPHONE ENCOUNTER
"Requested Prescriptions   Pending Prescriptions Disp Refills     senna-docusate (SENOKOT-S/PERICOLACE) 8.6-50 MG tablet 100 tablet     Last Written Prescription Date:  1/20/19  Last Fill Quantity: 100,  # refills: na   Last Office Visit with [facility], P or [facility] prescribing provider:  8/24/18   Future Office Visit:      Sig: Take 1 tablet by mouth daily as needed for constipation    Laxatives Protocol Failed - 3/18/2019  4:14 PM       Failed - Medication is active on med list       Passed - Patient is age 6 or older       Passed - Recent (12 mo) or future (30 days) visit within the authorizing provider's specialty    Patient had office visit in the last 12 months or has a visit in the next 30 days with authorizing provider or within the authorizing provider's specialty.  See \"Patient Info\" tab in inbasket, or \"Choose Columns\" in Meds & Orders section of the refill encounter.                "

## 2019-03-18 NOTE — TELEPHONE ENCOUNTER
During RN CC assessment patient stated she needed to take 1 Senna S every night to stay regular and will be out of these soon.    Melissa Behl BSN, RN, N  [facility] Care Coordinator  [phone]

## 2019-03-18 NOTE — PROGRESS NOTES
Clinic Care Coordination Contact    Clinic Care Coordination Contact  OUTREACH    Referral Information:  Referral Source: IP Report    Primary Diagnosis: CHF    Chief Complaint   Patient presents with     Clinic Care Coordination - Follow-up     RN        Universal Utilization: Patient is followed by cardiology, rheumatology, urology, endocrinology and pulmonology   Clinic Utilization  Difficulty keeping appointments:: No  Compliance Concerns: No  No-Show Concerns: No  No PCP office visit in Past Year: No  Utilization    Last refreshed: 3/18/2019 11:01 AM:  Hospital Admissions 6           Last refreshed: 3/18/2019 11:01 AM:  ED Visits 1           Last refreshed: 3/18/2019 11:01 AM:  No Show Count (past year) 2              Current as of: 3/18/2019 11:01 AM              Clinical Concerns:  Current Medical Concerns:  Patient remains in home care X1 more week.  Patient reports she is feeling much improved.  Patient is no longer short of breath while talking.  Patient states she has been instructed to take things slow, as when she feels this well she tends to want to take on too much and do things too quickly.  Patient states she has been able to do some cooking in the kitchen and is walking well.  Patient does not feel more frequent RN CC check-in's at this time are necessary and only requests monthly check-ins.  Patient Active Problem List   Diagnosis     Hypertension goal BP (blood pressure) < 150/90     Hypothyroidism     Seropositive rheumatoid arthritis (H)     Macular degeneration, left eye     Irritable bowel syndrome     Encounter for palliative care     Adjustment disorder with anxious mood     Mild anemia     DDD (degenerative disc disease), lumbar     CKD (chronic kidney disease) stage 3, GFR 30-59 ml/min (H)     History of blood transfusion     Aftercare following surgery     S/P lumbar laminectomy     High risk medication use     Age-related osteoporosis without current pathological fracture     Atrophic  vaginitis     Fecal incontinence     Female stress incontinence     Impingement syndrome of both shoulders     UIP (usual interstitial pneumonitis) (H)     High risk medications (not anticoagulants) long-term use     Heart failure with preserved ejection fraction (H)     Congestive heart failure with preserved LV function, NYHA class 3 (H)     Other specified hypothyroidism     Rheumatoid arthritis involving multiple sites with positive rheumatoid factor (H)     Health Care Home     Sick sinus syndrome (H)     Cardiac pacemaker - Medtronic dual lead pacemaker - Not Dependent - MRI Safe     Immunosuppression (H)     ILD (interstitial lung disease) (H)     Heart failure with preserved ejection fraction, NYHA class I (H)     Atrial flutter (H)     Paroxysmal atrial fibrillation (H)     CHF exacerbation (H)     Pre-syncope     (HFpEF) heart failure with preserved ejection fraction (H)     Mitral regurgitation     SOB (shortness of breath)     Mitral valve insufficiency, unspecified etiology     Abnormal findings on diagnostic imaging of cardiovascular system     Status post coronary angiogram     Dyspnea     RUSSELL (dyspnea on exertion)     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  Patient will be out of this medication.  Refill request sent to refill pool.     Functional Status:  Dependent ADLs:: Bathing, Ambulation-walker  Dependent IADLs:: Cooking, Cleaning, Laundry, Shopping, Medication Management, Money  Management, Transportation  Bed or wheelchair confined:: No  Mobility Status: Independent w/Device    Living Situation:  Current living arrangement:: I live alone  Type of residence:: Independent Senior Living    Diet/Exercise/Sleep:  Diet:: Low cholesterol, Low saturated fat, No added salt  Inadequate nutrition (GOAL):: No  Food Insecurity: No  Tube Feeding: No  Exercise:: Currently not exercising  Inadequate activity/exercise (GOAL):: No  Significant changes in sleep pattern (GOAL): No    Transportation:  Transportation concerns (GOAL):: No  Transportation means:: Family, [place] mobility     Psychosocial:  Gnosticism or spiritual beliefs that impact treatment:: No  Mental health DX:: No  Mental health management concern (GOAL):: No  Informal Support system:: Stacey based, Family, Friends, Neighbors, Spouse     Financial/Insurance:   Financial/Insurance concerns (GOAL):: No       Resources and Interventions:  Current Resources:   List of home care services:: Skilled Nursing, Physicial Therapy, Occupational Therapy;   Community Resources: None  Supplies used at home:: None  Equipment Currently Used at Home: raised toilet, shower chair, walker, rolling, wheelchair, power    Advance Care Plan/Directive  Advanced Care Plans/Directives on file:: Yes  Type Advanced Care Plans/Directives: Advanced Directive - On File  Advanced Care Plan/Directive Status: Not Applicable  Patient has completed her Advance care directive and there is a copy of one on file.    Referrals Placed: None     Goals: completed, patient on maintenance care coordination status        Patient/Caregiver understanding: Patient has a good understanding of her current plan of care, but requests ongoing care coordination due to her multiple specialists and complex medical diagnoses.    Outreach Frequency: monthly  Future Appointments              In 1 week [facility]    In 1 week LAB ONC [facility] [place]  [facility]    In 1 week [address], [place]    In 1 week Karla Mabry NP [facility] ER [facility] AT [facility], [facility], [place] PSA CLIN    In 1 month [address], [place]    In 1 month UC ICD [facility], [facility]    In 4 months [facility] Davenport MD [facility]          Plan:   1. Patient will continue to follow treatment plan as directed and follow up with PCP and specialists with concerns ongoing.   2. RN CC sent refill request for Senna-S to the refill pool.  3. RN CC will follow up with patient on a monthly basis and remain available to patient and care team as needed.    Melissa Behl BSN, RN, N  [facility] Care Coordinator  [phone]

## 2019-03-19 ENCOUNTER — TELEPHONE (OUTPATIENT)
Dept: UROLOGY | Facility: CLINIC | Age: 84
End: 2019-03-19

## 2019-03-19 DIAGNOSIS — R82.90 NONSPECIFIC FINDING ON EXAMINATION OF URINE: Primary | ICD-10-CM

## 2019-03-19 DIAGNOSIS — R30.0 DYSURIA: ICD-10-CM

## 2019-03-19 DIAGNOSIS — N30.00 ACUTE CYSTITIS WITHOUT HEMATURIA: ICD-10-CM

## 2019-03-19 DIAGNOSIS — R30.0 DYSURIA: Primary | ICD-10-CM

## 2019-03-19 LAB
ALBUMIN UR-MCNC: 30 MG/DL
APPEARANCE UR: ABNORMAL
BACTERIA #/AREA URNS HPF: ABNORMAL /HPF
BILIRUB UR QL STRIP: NEGATIVE
COLOR UR AUTO: YELLOW
GLUCOSE UR STRIP-MCNC: NEGATIVE MG/DL
HGB UR QL STRIP: NEGATIVE
KETONES UR STRIP-MCNC: ABNORMAL MG/DL
LEUKOCYTE ESTERASE UR QL STRIP: ABNORMAL
NITRATE UR QL: POSITIVE
NON-SQ EPI CELLS #/AREA URNS LPF: ABNORMAL /LPF
PH UR STRIP: 8.5 PH (ref 5–7)
RBC #/AREA URNS AUTO: ABNORMAL /HPF
SOURCE: ABNORMAL
SP GR UR STRIP: 1.01 (ref 1–1.03)
UROBILINOGEN UR STRIP-ACNC: 0.2 EU/DL (ref 0.2–1)
WBC #/AREA URNS AUTO: ABNORMAL /HPF

## 2019-03-19 PROCEDURE — 87088 URINE BACTERIA CULTURE: CPT | Performed by: PHYSICIAN ASSISTANT

## 2019-03-19 PROCEDURE — 87086 URINE CULTURE/COLONY COUNT: CPT | Performed by: PHYSICIAN ASSISTANT

## 2019-03-19 PROCEDURE — 87186 SC STD MICRODIL/AGAR DIL: CPT | Performed by: PHYSICIAN ASSISTANT

## 2019-03-19 PROCEDURE — 81001 URINALYSIS AUTO W/SCOPE: CPT | Performed by: PHYSICIAN ASSISTANT

## 2019-03-19 NOTE — TELEPHONE ENCOUNTER
FYI- Patients daughter called and stated that patient symptoms are burning sensation and strong order- Symptoms present 3 days ago.

## 2019-03-19 NOTE — TELEPHONE ENCOUNTER
Health Call Center    Phone Message    May a detailed message be left on voicemail: yes    Reason for Call: Order(s): Other:   Reason for requested: Patient is requesting a UA ordered since she is experiencing symptoms of UTI. Daughter is requesting order and to be  Scheduled at the [facility].   Date needed: asap  Provider name: Asia Steven      Action Taken: Message routed to:  Adult Clinics: Urology p [id number]

## 2019-03-19 NOTE — TELEPHONE ENCOUNTER
Pt's daughter, Xi, states pt just dropped off the urine lab sample at [place]. Once results received, Rx can be sent to [facility] in [place], Fax # [phone]

## 2019-03-19 NOTE — TELEPHONE ENCOUNTER
Chart reviewed and 3/19/19 UA results are still in process. Will send message with request for Asia Steven PA-C to review results and send antibiotic if needed. Patient has listed allergies of ciprofloxacin, macrobid, cephalexin, and sulfa.     Sena Chapman RN, BSN

## 2019-03-20 DIAGNOSIS — I50.30 HEART FAILURE WITH PRESERVED EJECTION FRACTION (H): Primary | ICD-10-CM

## 2019-03-20 RX ORDER — CEFDINIR 300 MG/1
300 CAPSULE ORAL DAILY
Qty: 7 CAPSULE | Refills: 0 | Status: SHIPPED | OUTPATIENT
Start: 2019-03-20 | End: 2019-06-14

## 2019-03-20 NOTE — TELEPHONE ENCOUNTER
Nurse spoke to daughter and updated that an antibiotic was sent and clinic will call back if culture comes back needing an alternative medication.    Jennifer Smith RN, BSN, PHN  [facility]  GI/Gen Surg/Hepatology Care Coordinator

## 2019-03-20 NOTE — TELEPHONE ENCOUNTER
Please notify patient that I sent an antibiotic to her pharmacy to treat possible UTI while awaiting culture results. The antibiotic is cefdinir which she should take once per day for 7 days.   Thanks!   Asia Steven PA-C

## 2019-03-21 LAB
BACTERIA SPEC CULT: ABNORMAL
SPECIMEN SOURCE: ABNORMAL

## 2019-03-21 NOTE — TELEPHONE ENCOUNTER
3/19/19 urine culture shows that cefdinir is sensitive (ceftriaxone listed on culture) and no antibiotic change needed. Will close encounter at this time.    Sena Chapman RN, BSN

## 2019-03-25 ENCOUNTER — ANCILLARY PROCEDURE (OUTPATIENT)
Dept: CARDIOLOGY | Facility: CLINIC | Age: 84
End: 2019-03-25
Attending: INTERNAL MEDICINE
Payer: MEDICARE

## 2019-03-25 DIAGNOSIS — I48.0 PAROXYSMAL ATRIAL FIBRILLATION (H): ICD-10-CM

## 2019-03-25 PROCEDURE — 93294 REM INTERROG EVL PM/LDLS PM: CPT | Performed by: INTERNAL MEDICINE

## 2019-03-25 PROCEDURE — 93296 REM INTERROG EVL PM/IDS: CPT | Mod: ZF

## 2019-03-26 ENCOUNTER — INFUSION THERAPY VISIT (OUTPATIENT)
Dept: INFUSION THERAPY | Facility: CLINIC | Age: 84
End: 2019-03-26
Payer: MEDICARE

## 2019-03-26 VITALS
DIASTOLIC BLOOD PRESSURE: 72 MMHG | RESPIRATION RATE: 16 BRPM | OXYGEN SATURATION: 99 % | HEART RATE: 72 BPM | TEMPERATURE: 97.6 F | WEIGHT: 164 LBS | BODY MASS INDEX: 28.59 KG/M2 | SYSTOLIC BLOOD PRESSURE: 145 MMHG

## 2019-03-26 DIAGNOSIS — I50.30 HEART FAILURE WITH PRESERVED EJECTION FRACTION (H): ICD-10-CM

## 2019-03-26 DIAGNOSIS — M05.79 RHEUMATOID ARTHRITIS INVOLVING MULTIPLE SITES WITH POSITIVE RHEUMATOID FACTOR (H): Primary | ICD-10-CM

## 2019-03-26 LAB
ANION GAP SERPL CALCULATED.3IONS-SCNC: 5 MMOL/L (ref 3–14)
BUN SERPL-MCNC: 44 MG/DL (ref 7–30)
CALCIUM SERPL-MCNC: 8 MG/DL (ref 8.5–10.1)
CHLORIDE SERPL-SCNC: 106 MMOL/L (ref 94–109)
CO2 SERPL-SCNC: 29 MMOL/L (ref 20–32)
CREAT SERPL-MCNC: 1.62 MG/DL (ref 0.52–1.04)
GFR SERPL CREATININE-BSD FRML MDRD: 28 ML/MIN/{1.73_M2}
GLUCOSE SERPL-MCNC: 109 MG/DL (ref 70–99)
POTASSIUM SERPL-SCNC: 3.9 MMOL/L (ref 3.4–5.3)
SODIUM SERPL-SCNC: 140 MMOL/L (ref 133–144)

## 2019-03-26 PROCEDURE — 99207 ZZC NO CHARGE NURSE ONLY: CPT

## 2019-03-26 PROCEDURE — 80048 BASIC METABOLIC PNL TOTAL CA: CPT | Performed by: NURSE PRACTITIONER

## 2019-03-26 PROCEDURE — 96365 THER/PROPH/DIAG IV INF INIT: CPT | Performed by: NURSE PRACTITIONER

## 2019-03-26 PROCEDURE — 36415 COLL VENOUS BLD VENIPUNCTURE: CPT | Performed by: NURSE PRACTITIONER

## 2019-03-26 RX ADMIN — Medication 250 ML: at 10:26

## 2019-03-26 ASSESSMENT — PAIN SCALES - GENERAL: PAINLEVEL: NO PAIN (0)

## 2019-03-26 NOTE — PROGRESS NOTES
Infusion Nursing Note:  Linda Spicer presents today for Orencia.    Patient seen by provider today: No   present during visit today: Not Applicable.    Note: Receives every 4 weeks. Last received 2/26/19.     Intravenous Access:  Peripheral IV placed.    Treatment Conditions:  Biological Infusion Checklist:    ~~~ NOTE: If the patient answers yes to any of the questions below, hold the infusion and contact ordering provider or on-call provider.    1. Have you recently had an elevated temperature, fever, chills, productive cough, coughing for 3 weeks or longer or hemoptysis,  abnormal vital signs, night sweats,  chest pain or have you noticed a decrease in your appetite, unexplained weight loss or fatigue? No  2. Do you have any open wounds or new incisions? No  3. Do you have any recent or upcoming hospitalizations, surgeries or dental procedures? No  4. Do you currently have or recently have had any signs of illness or infection or are you on any antibiotics? No-UTI diagnosed 3/19-symptoms resolved, completed antibiotics    5. Have you had any new, sudden or worsening abdominal pain? No  6. Have you or anyone in your household received a live vaccination in the past 4 weeks? Please note:  No live vaccines while on biologic/chemotherapy until 6 months after the last treatment.  Patient can receive the flu vaccine (shot only) and the pneumovax.  It is optimal for the patient to get these vaccines mid cycle, but they can be given at any time as long as it is not on the day of the infusion. No  7. Have you recently been diagnosed with any new nervous system diseases (ie. Multiple sclerosis, Guillain Lima, seizures, neurological changes) or cancer diagnosis? Are you on any form of radiation or chemotherapy? No  8. Are you pregnant or breast feeding or do you have plans of pregnancy in the future? No  9. Have you been having any signs of worsening depression or suicidal ideations?  (benlysta only)  No  10. Have there been any other new onset medical symptoms? No    Post Infusion Assessment:  Patient tolerated infusion without incident.  Site patent and intact, free from redness, edema or discomfort.  No evidence of extravasations.  Access discontinued per protocol.  Biologic Infusion Post Education: Call the triage nurse at your clinic or seek medical attention if you have chills and/or temperature greater than or equal to 100.5, uncontrolled nausea/vomiting, diarrhea, constipation, dizziness, shortness of breath, chest pain, heart palpitations, weakness or any other new or concerning symptoms, questions or concerns.  You cannot have any live virus vaccines prior to or during treatment or up to 6 months post infusion.  If you have an upcoming surgery, medical procedure or dental procedure during treatment, this should be discussed with your ordering physician and your surgeon/dentist.  If you are having any concerning symptom, if you are unsure if you should get your next infusion or wish to speak to a provider before your next infusion, please call your care coordinator or triage nurse at your clinic to notify them so we can adequately serve you.     Discharge Plan:   Patient will return 4/23/19 for next appointment.   Patient discharged in stable condition accompanied by: son.  Departure Mode: Ambulatory.    Michelle Guadalupe RN

## 2019-03-28 ENCOUNTER — OFFICE VISIT (OUTPATIENT)
Dept: CARDIOLOGY | Facility: CLINIC | Age: 84
End: 2019-03-28
Attending: NURSE PRACTITIONER
Payer: MEDICARE

## 2019-03-28 VITALS
WEIGHT: 163 LBS | SYSTOLIC BLOOD PRESSURE: 114 MMHG | BODY MASS INDEX: 28.42 KG/M2 | OXYGEN SATURATION: 98 % | HEART RATE: 66 BPM | DIASTOLIC BLOOD PRESSURE: 59 MMHG

## 2019-03-28 DIAGNOSIS — N18.4 CKD (CHRONIC KIDNEY DISEASE) STAGE 4, GFR 15-29 ML/MIN (H): ICD-10-CM

## 2019-03-28 DIAGNOSIS — Z95.818 S/P MITRAL VALVE CLIP IMPLANTATION: ICD-10-CM

## 2019-03-28 DIAGNOSIS — I10 BENIGN ESSENTIAL HYPERTENSION: ICD-10-CM

## 2019-03-28 DIAGNOSIS — E03.9 HYPOTHYROIDISM, UNSPECIFIED TYPE: ICD-10-CM

## 2019-03-28 DIAGNOSIS — I48.20 CHRONIC ATRIAL FIBRILLATION (H): ICD-10-CM

## 2019-03-28 DIAGNOSIS — Z98.890 S/P MITRAL VALVE CLIP IMPLANTATION: ICD-10-CM

## 2019-03-28 DIAGNOSIS — I50.33 ACUTE ON CHRONIC DIASTOLIC HEART FAILURE (H): Primary | ICD-10-CM

## 2019-03-28 LAB
MDC_IDC_EPISODE_DTM: NORMAL
MDC_IDC_EPISODE_DURATION: 1 S
MDC_IDC_EPISODE_DURATION: 129 S
MDC_IDC_EPISODE_DURATION: 153 S
MDC_IDC_EPISODE_DURATION: 52 S
MDC_IDC_EPISODE_DURATION: 68 S
MDC_IDC_EPISODE_DURATION: NORMAL S
MDC_IDC_EPISODE_ID: 594
MDC_IDC_EPISODE_ID: 595
MDC_IDC_EPISODE_ID: 596
MDC_IDC_EPISODE_ID: 597
MDC_IDC_EPISODE_ID: 598
MDC_IDC_EPISODE_ID: 599
MDC_IDC_EPISODE_ID: 600
MDC_IDC_EPISODE_ID: 601
MDC_IDC_EPISODE_ID: 602
MDC_IDC_EPISODE_ID: 603
MDC_IDC_EPISODE_ID: 604
MDC_IDC_EPISODE_ID: 605
MDC_IDC_EPISODE_TYPE: NORMAL
MDC_IDC_LEAD_IMPLANT_DT: NORMAL
MDC_IDC_LEAD_IMPLANT_DT: NORMAL
MDC_IDC_LEAD_LOCATION: NORMAL
MDC_IDC_LEAD_LOCATION: NORMAL
MDC_IDC_LEAD_LOCATION_DETAIL_1: NORMAL
MDC_IDC_LEAD_LOCATION_DETAIL_1: NORMAL
MDC_IDC_LEAD_MFG: NORMAL
MDC_IDC_LEAD_MFG: NORMAL
MDC_IDC_LEAD_MODEL: NORMAL
MDC_IDC_LEAD_MODEL: NORMAL
MDC_IDC_LEAD_POLARITY_TYPE: NORMAL
MDC_IDC_LEAD_POLARITY_TYPE: NORMAL
MDC_IDC_LEAD_SERIAL: NORMAL
MDC_IDC_LEAD_SERIAL: NORMAL
MDC_IDC_MSMT_BATTERY_DTM: NORMAL
MDC_IDC_MSMT_BATTERY_REMAINING_LONGEVITY: 78 MO
MDC_IDC_MSMT_BATTERY_RRT_TRIGGER: 2.83
MDC_IDC_MSMT_BATTERY_STATUS: NORMAL
MDC_IDC_MSMT_BATTERY_VOLTAGE: 3 V
MDC_IDC_MSMT_LEADCHNL_RA_IMPEDANCE_VALUE: 323 OHM
MDC_IDC_MSMT_LEADCHNL_RA_IMPEDANCE_VALUE: 475 OHM
MDC_IDC_MSMT_LEADCHNL_RA_SENSING_INTR_AMPL: 1.5 MV
MDC_IDC_MSMT_LEADCHNL_RA_SENSING_INTR_AMPL: 1.5 MV
MDC_IDC_MSMT_LEADCHNL_RV_IMPEDANCE_VALUE: 342 OHM
MDC_IDC_MSMT_LEADCHNL_RV_IMPEDANCE_VALUE: 399 OHM
MDC_IDC_MSMT_LEADCHNL_RV_SENSING_INTR_AMPL: 13.62 MV
MDC_IDC_MSMT_LEADCHNL_RV_SENSING_INTR_AMPL: 13.62 MV
MDC_IDC_PG_IMPLANT_DTM: NORMAL
MDC_IDC_PG_MFG: NORMAL
MDC_IDC_PG_MODEL: NORMAL
MDC_IDC_PG_SERIAL: NORMAL
MDC_IDC_PG_TYPE: NORMAL
MDC_IDC_SESS_CLINIC_NAME: NORMAL
MDC_IDC_SESS_DTM: NORMAL
MDC_IDC_SESS_TYPE: NORMAL
MDC_IDC_SET_BRADY_AT_MODE_SWITCH_RATE: 171 {BEATS}/MIN
MDC_IDC_SET_BRADY_HYSTRATE: NORMAL
MDC_IDC_SET_BRADY_LOWRATE: 70 {BEATS}/MIN
MDC_IDC_SET_BRADY_MAX_SENSOR_RATE: 130 {BEATS}/MIN
MDC_IDC_SET_BRADY_MAX_TRACKING_RATE: 130 {BEATS}/MIN
MDC_IDC_SET_BRADY_MODE: NORMAL
MDC_IDC_SET_BRADY_PAV_DELAY_LOW: 180 MS
MDC_IDC_SET_BRADY_SAV_DELAY_LOW: 150 MS
MDC_IDC_SET_LEADCHNL_RA_PACING_AMPLITUDE: 2 V
MDC_IDC_SET_LEADCHNL_RA_PACING_ANODE_ELECTRODE_1: NORMAL
MDC_IDC_SET_LEADCHNL_RA_PACING_ANODE_LOCATION_1: NORMAL
MDC_IDC_SET_LEADCHNL_RA_PACING_CAPTURE_MODE: NORMAL
MDC_IDC_SET_LEADCHNL_RA_PACING_CATHODE_ELECTRODE_1: NORMAL
MDC_IDC_SET_LEADCHNL_RA_PACING_CATHODE_LOCATION_1: NORMAL
MDC_IDC_SET_LEADCHNL_RA_PACING_POLARITY: NORMAL
MDC_IDC_SET_LEADCHNL_RA_PACING_PULSEWIDTH: 0.4 MS
MDC_IDC_SET_LEADCHNL_RA_SENSING_ANODE_ELECTRODE_1: NORMAL
MDC_IDC_SET_LEADCHNL_RA_SENSING_ANODE_LOCATION_1: NORMAL
MDC_IDC_SET_LEADCHNL_RA_SENSING_CATHODE_ELECTRODE_1: NORMAL
MDC_IDC_SET_LEADCHNL_RA_SENSING_CATHODE_LOCATION_1: NORMAL
MDC_IDC_SET_LEADCHNL_RA_SENSING_POLARITY: NORMAL
MDC_IDC_SET_LEADCHNL_RA_SENSING_SENSITIVITY: 0.3 MV
MDC_IDC_SET_LEADCHNL_RV_PACING_AMPLITUDE: 1.5 V
MDC_IDC_SET_LEADCHNL_RV_PACING_ANODE_ELECTRODE_1: NORMAL
MDC_IDC_SET_LEADCHNL_RV_PACING_ANODE_LOCATION_1: NORMAL
MDC_IDC_SET_LEADCHNL_RV_PACING_CAPTURE_MODE: NORMAL
MDC_IDC_SET_LEADCHNL_RV_PACING_CATHODE_ELECTRODE_1: NORMAL
MDC_IDC_SET_LEADCHNL_RV_PACING_CATHODE_LOCATION_1: NORMAL
MDC_IDC_SET_LEADCHNL_RV_PACING_POLARITY: NORMAL
MDC_IDC_SET_LEADCHNL_RV_PACING_PULSEWIDTH: 0.4 MS
MDC_IDC_SET_LEADCHNL_RV_SENSING_ANODE_ELECTRODE_1: NORMAL
MDC_IDC_SET_LEADCHNL_RV_SENSING_ANODE_LOCATION_1: NORMAL
MDC_IDC_SET_LEADCHNL_RV_SENSING_CATHODE_ELECTRODE_1: NORMAL
MDC_IDC_SET_LEADCHNL_RV_SENSING_CATHODE_LOCATION_1: NORMAL
MDC_IDC_SET_LEADCHNL_RV_SENSING_POLARITY: NORMAL
MDC_IDC_SET_LEADCHNL_RV_SENSING_SENSITIVITY: 0.9 MV
MDC_IDC_SET_ZONE_DETECTION_INTERVAL: 350 MS
MDC_IDC_SET_ZONE_DETECTION_INTERVAL: 400 MS
MDC_IDC_SET_ZONE_TYPE: NORMAL
MDC_IDC_STAT_AT_BURDEN_PERCENT: 73.2 %
MDC_IDC_STAT_AT_DTM_END: NORMAL
MDC_IDC_STAT_AT_DTM_START: NORMAL
MDC_IDC_STAT_BRADY_AP_VP_PERCENT: 0.61 %
MDC_IDC_STAT_BRADY_AP_VS_PERCENT: 23.17 %
MDC_IDC_STAT_BRADY_AS_VP_PERCENT: 66.48 %
MDC_IDC_STAT_BRADY_AS_VS_PERCENT: 9.75 %
MDC_IDC_STAT_BRADY_DTM_END: NORMAL
MDC_IDC_STAT_BRADY_DTM_START: NORMAL
MDC_IDC_STAT_BRADY_RA_PERCENT_PACED: 17.88 %
MDC_IDC_STAT_BRADY_RV_PERCENT_PACED: 66.78 %
MDC_IDC_STAT_EPISODE_RECENT_COUNT: 0
MDC_IDC_STAT_EPISODE_RECENT_COUNT: 0
MDC_IDC_STAT_EPISODE_RECENT_COUNT: 1
MDC_IDC_STAT_EPISODE_RECENT_COUNT: 11
MDC_IDC_STAT_EPISODE_RECENT_COUNT_DTM_END: NORMAL
MDC_IDC_STAT_EPISODE_RECENT_COUNT_DTM_START: NORMAL
MDC_IDC_STAT_EPISODE_TOTAL_COUNT: 0
MDC_IDC_STAT_EPISODE_TOTAL_COUNT: 0
MDC_IDC_STAT_EPISODE_TOTAL_COUNT: 4
MDC_IDC_STAT_EPISODE_TOTAL_COUNT: 601
MDC_IDC_STAT_EPISODE_TOTAL_COUNT_DTM_END: NORMAL
MDC_IDC_STAT_EPISODE_TOTAL_COUNT_DTM_START: NORMAL
MDC_IDC_STAT_EPISODE_TYPE: NORMAL

## 2019-03-28 PROCEDURE — 99214 OFFICE O/P EST MOD 30 MIN: CPT | Performed by: NURSE PRACTITIONER

## 2019-03-28 RX ORDER — LEVOTHYROXINE SODIUM 75 UG/1
75 TABLET ORAL DAILY
Qty: 90 TABLET | Refills: 0 | Status: SHIPPED | OUTPATIENT
Start: 2019-03-28 | End: 2019-06-21

## 2019-03-28 RX ORDER — FUROSEMIDE 20 MG
20 TABLET ORAL 2 TIMES DAILY
Qty: 270 TABLET | Refills: 3 | Status: SHIPPED | OUTPATIENT
Start: 2019-03-28 | End: 2019-09-05

## 2019-03-28 NOTE — NURSING NOTE
"Chief Complaint   Patient presents with     Heart Failure     Return [facility], 88 yo female with HFpEF and s/p Mitraclip presents for follow up with labs prior.      Atrial Fib     Per patient improved since last visit, and occational sob.,and fatigue       Initial /59 (BP Location: Left arm, Patient Position: Sitting, Cuff Size: Adult Large)   Pulse 66   Wt 73.9 kg (163 lb)   LMP  (LMP Unknown)   SpO2 98%   BMI 28.42 kg/m   Estimated body mass index is 28.42 kg/m  as calculated from the following:    Height as of 2/26/19: 1.613 m (5' 3.5\").    Weight as of this encounter: 73.9 kg (163 lb)..  BP completed using cuff size: large  Patient here with another person, IPV. Screen not initiated. Alexander Melendez LPN.      Alexander MCKINNEYPDrissN.    "

## 2019-03-28 NOTE — TELEPHONE ENCOUNTER
"Requested Prescriptions   Pending Prescriptions Disp Refills     levothyroxine (SYNTHROID/LEVOTHROID) 75 MCG tablet 30 tablet 0    Last Written Prescription Date:  2/20/19 ended  Last Fill Quantity: 30 tab,  # refills: 0   Last office visit: 10/29/2018 with prescribing provider:  Tre Lockett     Future Office Visit:     Sig: Take 1 tablet (75 mcg) by mouth daily    Thyroid Protocol Passed - 3/28/2019  9:10 AM       Passed - Patient is 12 years or older       Passed - Recent (12 mo) or future (30 days) visit within the authorizing provider's specialty    Patient had office visit in the last 12 months or has a visit in the next 30 days with authorizing provider or within the authorizing provider's specialty.  See \"Patient Info\" tab in inbasket, or \"Choose Columns\" in Meds & Orders section of the refill encounter.             Passed - Medication is active on med list       Passed - Normal TSH on file in past 12 months    Recent Labs   Lab Test 01/18/19  0447   TSH 0.73             Passed - No active pregnancy on record    If patient is pregnant or has had a positive pregnancy test, please check TSH.         Passed - No positive pregnancy test in past 12 months    If patient is pregnant or has had a positive pregnancy test, please check TSH.            "

## 2019-03-28 NOTE — PROGRESS NOTES
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    Since her Mitraclip, Linda is feeling better overall.  She has been able to bake, which she wasn't able to do previously. She is more active socially.  She is able to walk 200 feet without difficulty.  She denies any shortness of breath at rest, PND, orthopnea.  Her appetite is good.  She is trying to stay within her sodium and fluid restrictions but still struggles with this.  She denies any chest pain, lightheadedness, or syncopal episodes.  Her weight has been climbing.  Her baseline is typically in the 154-155 range.  She is currently up to 158 pounds.  Her blood pressure has been well controlled.  She has gone out to eat a couple of times and feels that she is retaining more fluid currently.    She is also questioning when she sees the electrophysiologist next. She would like to know if there are any further measures to help put her back in SR.       PAST MEDICAL HISTORY:  Past Medical History:   Diagnosis Date     Adjustment disorder with anxious mood 5/18/2015     Advanced directives, counseling/discussion 8/30/2012    Patient states has Advance Directive and will bring in a copy to clinic. 8/30/2012   Tevin May  [facility] Medical Assistant \       Anemia 9/25/2015     Basal cell cancer      CHF (congestive heart failure) (H) 9/18/2014     CKD (chronic kidney disease) stage 3, GFR 30-59 ml/min (H) 9/29/2015     DDD (degenerative disc disease), lumbar 9/25/2015     Diffuse idiopathic pulmonary fibrosis (H) 5/6/2013     Encounter for palliative care 5/18/2015     History of blood transfusion 9/29/2015     Hypertension goal BP (blood pressure) < 140/90 9/7/2012      Hypothyroid 9/7/2012     Irritable bowel syndrome 10/29/2013     Macular degeneration      Macular degeneration, 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     Nondisplaced spiral fracture of shaft of humerus      Osteoporosis 8/13/2013     Imo Update utility     RA (rheumatoid arthritis) (H) 5/7/2013     Rheumatic fever      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 2/20/2019     Shingles      Spinal stenosis of lumbar region with neurogenic claudication 9/14/2015       FAMILY HISTORY:  Family History   Problem Relation Age of Onset     Hypertension Mother      Psychotic Disorder Father      Diabetes Son      Diabetes Daughter      Blood Disease Daughter        SOCIAL HISTORY:  Social History     Socioeconomic History     Marital status:      Spouse name: None     Number of children: None     Years of education: None     Highest education level: None   Occupational History     None   Social Needs     Financial resource strain: None     Food insecurity:     Worry: None     Inability: None     Transportation needs:     Medical: None     Non-medical: None   Tobacco Use     Smoking status: Never Smoker     Smokeless tobacco: Never Used   Substance and Sexual Activity     Alcohol use: Yes     Comment: rare wine      Drug use: No     Sexual activity: No   Lifestyle     Physical activity:     Days per week: 0 days     Minutes per session: 0 min     Stress: None   Relationships     Social connections:     Talks on phone: None     Gets together: None     Attends [employer] service: None     Active member of club or organization: None     Attends meetings of clubs or organizations: None     Relationship status: None     Intimate partner violence:     Fear of current or ex partner: None     Emotionally abused: None     Physically abused: None     Forced sexual activity: None   Other Topics Concern     Parent/sibling w/ CABG, MI or angioplasty before 65F 55M? No   Social History Narrative    . Has six children. She enjoys bridge and  genealogy.  passed away.  Her son has financial and alcohol issues.       CURRENT MEDICATIONS:  Current Outpatient Medications   Medication Sig Dispense Refill     apixaban ANTICOAGULANT (ELIQUIS) 2.5 MG tablet Take 1 tablet (2.5 mg) by mouth 2 times daily Stop taking 2/9 (last dose evening of Feb 8, 2019) in preparation for MitraClip Procedure on Monday 60 tablet 0     aspirin (ASA) 81 MG EC tablet Take 1 tablet (81 mg) by mouth daily 90 tablet 0     cefdinir (OMNICEF) 300 MG capsule Take 1 capsule (300 mg) by mouth daily 7 capsule 0     denosumab (PROLIA) 60 MG/ML SOLN injection Inject 1 mL (60 mg) Subcutaneous every 6 months 1 mL      diphenhydrAMINE (BENADRYL) 25 MG tablet Take 25 mg by mouth as needed Patient takes 25-50mg 1-2 times a day.       furosemide (LASIX) 20 MG tablet Take 1 tablet (20 mg) by mouth 2 times daily 40mg by mouth every morning and 20mg by mouth every afternoon 270 tablet 3     hydrALAZINE (APRESOLINE) 10 MG tablet Take 1 tablet (10 mg) by mouth 3 times daily 90 tablet 3     isosorbide mononitrate (IMDUR) 60 MG 24 hr tablet Take 1 tablet (60 mg) by mouth daily 90 tablet 0     order for DME Dispense SP Walker-small 1 each 0     order for DME Equipment being ordered: Dispense baffle, for use with nebulizer. 1 each 0     order for DME Equipment being ordered: Nebulizer. Use with Albuterol. 1 each 0     order for DME Equipment being ordered: Dispense face mask.  Mrs. Spicer is immunosuppressed due to rheumatoid arthritis. 1 Box 11     ranitidine (ZANTAC) 150 MG tablet Take 1 tablet (150 mg) by mouth At Bedtime       senna-docusate (SENOKOT-S/PERICOLACE) 8.6-50 MG tablet Take 1 tablet by mouth daily as needed for constipation 100 tablet 3     trimethoprim (TRIMPEX) 100 MG tablet Take 0.5 tablets (50 mg) by mouth daily 45 tablet 0     albuterol (PROVENTIL) (2.5 MG/3ML) 0.083% neb solution Take 1 vial (2.5 mg) by nebulization every 6 hours as needed for shortness of breath / dyspnea or  wheezing 360 mL      azaTHIOprine (IMURAN) 50 MG tablet Take 1 tablet (50 mg) by mouth daily (Patient taking differently: Take 50 mg by mouth every evening ) 30 tablet 0     carvedilol (COREG) 3.125 MG tablet Take 1 tablet (3.125 mg) by mouth 2 times daily (with meals) 60 tablet 0     levothyroxine (SYNTHROID/LEVOTHROID) 75 MCG tablet Take 1 tablet (75 mcg) by mouth daily 30 tablet 0     nitroGLYcerin (NITROSTAT) 0.4 MG sublingual tablet Place 1 tablet (0.4 mg) under the tongue every 5 minutes as needed for chest pain 25 tablet 11       ROS:  Constitutional: Denies fever, chills, or diaphoresis.  +weight gain.   ENT: +macular degeneration     Cardiovascular: As per HPI.   GI: +constipation and diarrhea  : Denies urinary frequency, dysuria, or hematuria.   Integument: Negative for bruising, rash, or pruritis.  Psychiatric: +anxiety and depression  Neuro: Negative for headaches, syncope, numbness or tingling.   Endocrinology: +thyroid disorder  Musculoskeletal: +gait instability    EXAM:  /59 (BP Location: Left arm, Patient Position: Sitting, Cuff Size: Adult Large)   Pulse 66   Wt 73.9 kg (163 lb)   LMP  (LMP Unknown)   SpO2 98%   BMI 28.42 kg/m    General: alert, articulate, and in no acute distress.  HEENT: normocephalic, atraumatic, anicteric sclera, EOMI,  mucosa moist, no cyanosis.    Neck: neck supple. JVP 10 cm with +HJR   Heart: Irregular rhythm, normal S1/S2, no murmur, gallop, rub.  Precordium quiet with normal PMI.     Lungs: clear, no rales, ronchi, or wheezing.  No accessory muscle use, respirations unlabored.   Abdomen: soft, non-tender, bowel sounds present, no hepatomegaly  Extremities: Trace edema.  No cyanosis.  Extremities warm to touch.  Neurological: Alert and oriented x 3.  Normal speech and affect, no gross motor deficits  Skin:  No ecchymoses, rashes, or clubbing.       Labs:  CBC RESULTS:  Lab Results   Component Value Date    WBC 6.4 03/13/2019    RBC 3.35 (L) 03/13/2019    HGB  10.9 (L) 2019    HCT 34.0 (L) 2019     (H) 2019    MCH 32.5 2019    MCHC 32.1 2019    RDW 14.0 2019     2019       CMP RESULTS:  Lab Results   Component Value Date     2019    POTASSIUM 3.9 2019    CHLORIDE 106 2019    CO2 29 2019    ANIONGAP 5 2019     (H) 2019    BUN 44 (H) 2019    CR 1.62 (H) 2019    GFRESTIMATED 28 (L) 2019    GFRESTBLACK 33 (L) 2019    FATOUMATA 8.0 (L) 2019    BILITOTAL 0.4 2019    ALBUMIN 3.1 (L) 2019    ALKPHOS 47 2019    ALT 16 2019    AST 20 2019        INR RESULTS:  Lab Results   Component Value Date    INR 1.12 2019       No components found for: CK  Lab Results   Component Value Date    MAG 2.1 2019     Lab Results   Component Value Date    NTBNP 3,154 (H) 10/29/2018       Most recent echocardiogram:  Recent Results (from the past 4320 hour(s))   Echo limited (Adults Only)    Narrative    [id number]  [id number]  [id number]  [id number]^CHAD^YANDY^T           [facility],[place]  Echocardiography Laboratory  [address] [place], [place] [address]     Name: RG WALTER  MRN: [id number]  : 1931  Study Date: 2018 11:24 AM  Age: 87 yrs  Gender: Female  Patient Location: [facility]  Reason For Study: Mitral Valve Disorder  Ordering Physician: YANDY BONILLA  Performed By: Elver Smith RDCS     BSA: 1.7 m2  Height: 63 in  Weight: 156 lb  BP: 114/63 mmHg  _____________________________________________________________________________  __        Procedure  Limited Portable Echo Adult.  _____________________________________________________________________________  __        Interpretation Summary  Limited study.  Normal biventricular size and systolic function. The Ejection Fraction is  estimated at 55-60%.  Mild to moderate mitral insufficiency is present.  Mild to moderate tricuspid  insufficiency is present.  Estimated pulmonary artery systolic pressure is 26 mmHg plus right atrial  pressure.  Compared to ECHO on 10/9, there are no significant changes. Mitral  regurgitation less prominent compared to recent YOLANDA (10/16).     _____________________________________________________________________________  __        Left Ventricle  Left ventricular size is normal. There is increased LV wall thickness. The  Ejection Fraction is estimated at 55-60%. No regional wall motion  abnormalities are seen.     Right Ventricle  The right ventricle is normal size. Global right ventricular function is  normal.     Atria  Moderate right atrial enlargement is present. Moderate left atrial enlargement  is present.        Mitral Valve  Mild to moderate mitral insufficiency is present.     Aortic Valve  Trace aortic insufficiency is present.     Tricuspid Valve  Mild to moderate tricuspid insufficiency is present. Estimated pulmonary  artery systolic pressure is 26 mmHg plus right atrial pressure. There are  multiple TR jets.     Pulmonic Valve  The pulmonic valve cannot be assessed.     Vessels  The inferior vena cava is normal. The thoracic aorta cannot be assessed.     Pericardium  No pericardial effusion is present.        Compared to Previous Study  Compared to ECHO on 10/9, there are no significant changes. MR on YOLANDA (10/16)  appears more severe.  _____________________________________________________________________________  __           Doppler Measurements & Calculations  TV max P.5 mmHg  TR max adrian: 252.4 cm/sec  TR max P.5 mmHg     _____________________________________________________________________________  __           Report approved by: Blanca Adams 2018 02:40 PM      ECHO COMPLETE WITH OPTISON    Narrative    [id number]  ECH73  [id number]  [id number]^DINAH^CRYS           [facility],[place]  Echocardiography Laboratory  [address] [place] [address],  [place] [address]     Name: RG WALTER  MRN: [id number]  : 1931  Study Date: 10/09/2018 01:09 PM  Age: 87 yrs  Gender: Female  Patient Location: [facility]  Reason For Study: CHF  History: CHF  Ordering Physician: CRYS NIX  Referring Physician: ERIC ENGLE  Performed By: Sharonda Galo KANDY     BSA: 1.8 m2  Height: 63 in  Weight: 164 lb  BP: 180/87 mmHg  _____________________________________________________________________________  __        Procedure  Complete Portable Echo Adult. Contrast Optison. Optison (NDC #0738-4736-49)  given intravenously. Patient was given 6 ml mixture of 3 ml Optison and 6 ml  saline. 3 ml wasted.  _____________________________________________________________________________  __        Interpretation Summary  Global and regional left ventricular function is normal with an EF of 55-60%.  The right ventricle is not well visualized.  Mild to moderate tricuspid insufficiency is present.  There is mild pulmonary hypertension.  The inferior vena cava was normal in size.  This study was compared with the study from 14 .  There has been no change.     _____________________________________________________________________________  __        Left Ventricle  Global and regional left ventricular function is normal with an EF of 55-60%.  Left ventricular size is normal. Left ventricular wall thickness is normal.  Left ventricular diastolic function is indeterminate.     Right Ventricle  Likely normal RV function but unable to assess size. The right ventricle is  not well visualized.     Atria  Both atria appear normal. The atrial septum is intact as assessed by color  Doppler .     Mitral Valve  The mitral valve is normal. Mild to moderate mitral insufficiency is present.        Aortic Valve  Aortic valve is normal in structure and function. The aortic valve is  tricuspid. Mild aortic insufficiency is present.     Tricuspid Valve  Mild to moderate tricuspid insufficiency is  present. Right ventricular  systolic pressure is 40mmHg above the right atrial pressure. Mild (pulmonary  artery systolic pressure<50mmHg) pulmonary hypertension is present.     Pulmonic Valve  The pulmonic valve is normal.     Vessels  The aorta root is normal. The thoracic aorta is normal. The pulmonary artery  is normal. The inferior vena cava was normal in size with preserved  respiratory variability. Estimated mean right atrial pressure is 3 mmHg  (normal).     Pericardium  No pericardial effusion is present.        Compared to Previous Study  This study was compared with the study from 14 . There has been no  change.     Attestation  I have personally viewed the imaging and agree with the interpretation and  report as documented by the fellow, Parris Duvall, and/or edited by me.  _____________________________________________________________________________  __     MMode/2D Measurements & Calculations  IVSd: 1.1 cm  LVIDd: 3.7 cm  LVIDs: 1.9 cm  LVPWd: 1.0 cm  FS: 47.7 %  LV mass(C)d: 122.1 grams  LV mass(C)dI: 68.7 grams/m2  Ao root diam: 2.7 cm  LA dimension: 4.3 cm  asc Aorta Diam: 3.4 cm  LA/Ao: 1.6  LVOT diam: 2.0 cm  LVOT area: 3.1 cm2  LA Volume (BP): 51.3 ml     LA Volume Index (BP): 28.8 ml/m2  RWT: 0.54        Doppler Measurements & Calculations  MV E max alvarado: 104.0 cm/sec  MV A max alvarado: 33.8 cm/sec  MV E/A: 3.1  MV dec time: 0.14 sec  Ao V2 max: 89.2 cm/sec  Ao max PG: 3.0 mmHg  Ao V2 mean: 66.3 cm/sec  Ao mean P.0 mmHg  Ao V2 VTI: 16.6 cm  RAJ(I,D): 3.5 cm2  ARJ(V,D): 3.2 cm2  LV V1 max PG: 3.3 mmHg  LV V1 max: 90.7 cm/sec  LV V1 VTI: 18.7 cm  SV(LVOT): 58.7 ml  SI(LVOT): 33.1 ml/m2  PA acc time: 0.06 sec  TR max alvarado: 242.0 cm/sec  TR max P.7 mmHg  AV Alvarado Ratio (DI): 1.0  RAJ Index (cm2/m2): 2.0  Medial E/e': 19.5        _____________________________________________________________________________  __        Report approved by: Blanca Adams 10/09/2018 04:28 PM             Assessment and Plan:    In summary, this is a very pleasant 87 year old with HFpEF who appears mildly hypervolemic secondary to dietary indiscretion.  I have increased her Lasix to 40 mg in the am and 20 mg in the evening for the next 3-4 days until her weight returns to baseline, then she will resume her baseline Lasix dose of 20 mg twice daily.  She was instructed to increase her potassium rich foods while on the higher diuretic. She will see Dr. Lynn in April as scheduled, and will follow up in [facility] in 6 months or sooner if sx worsen.  1. HFpEF:   Stage C  NYHA CLASS IIIa:  Symptoms with minimal activity, no dyspnea at rest  BP: controlled 114/59  Fluid status hypervolemic, diurese as outlined above.   Aldosterone antagonist: contraindicated due to renal dysfunction  Ischemia evaluation: Negative dobutamine stress test in 2014  ACEi/ARB -  None  BB - Carvedilol 3.125 mg twice daily.   SCD prophylaxis - N/A, EF is normal   NSAID use: Contraindicated, reviewed with patient   Sleep Apnea Evaluation: None.  Refused CPAP or re-evaluation.   Remote Monitoring:  None.     2.  Atrial Fibrillation:  Anticoagulation: Apixaban 2.5 mg twice daily    Rate control:  Coreg 3.125 mg twice daily.  ChadsVasc Score: 4  HR 66.     3.  Tachy-Jadiel syndrome s/p PPM:  Device check as scheduled next month with Dr. Lynn.      4.  CKD Stage IV:  Creatinine 1.62 today.  Previously 1.76. Baseline per record review is 1.4.  Diurese as outlined above.      5. Mitral regurgitation:  s/p Mitraclip.  Followed by Dr. Loza.  Follow up with him in 6 months per previous recommendations.      6.  Follow-up: Dr. Lynn in April with device check.  [facility] in 6 months. Dr. Loza in 6 months.          Karla Mabry APRN, CNP, CHFN  [facility]

## 2019-03-28 NOTE — NURSING NOTE

## 2019-03-28 NOTE — LETTER
3/28/2019      RE: Linda Spicer  [address]  [address]  [facility] [address]       Dear Colleague,    Thank you for the opportunity to participate in the care of your patient, Linda Spicer, at the [facility] HEART AT [facility] at [facility]. Please see a copy of my visit note below.      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    Since her Mitraclip, Linda is feeling better overall.  She has been able to bake, which she wasn't able to do previously. She is more active socially.  She is able to walk 200 feet without difficulty.  She denies any shortness of breath at rest, PND, orthopnea.  Her appetite is good.  She is trying to stay within her sodium and fluid restrictions but still struggles with this.  She denies any chest pain, lightheadedness, or syncopal episodes.  Her weight has been climbing.  Her baseline is typically in the 154-155 range.  She is currently up to 158 pounds.  Her blood pressure has been well controlled.  She has gone out to eat a couple of times and feels that she is retaining more fluid currently.    She is also questioning when she sees the electrophysiologist next. She would like to know if there are any further measures to help put her back in SR.       PAST MEDICAL HISTORY:  Past Medical History:   Diagnosis Date     Adjustment disorder with anxious mood 5/18/2015     Advanced directives, counseling/discussion 8/30/2012    Patient states has Advance Directive and will bring in a copy to clinic. 8/30/2012   Tevin May  [facility] Medical Assistant \       Anemia 9/25/2015     Basal cell cancer      CHF  (congestive heart failure) (H) 9/18/2014     CKD (chronic kidney disease) stage 3, GFR 30-59 ml/min (H) 9/29/2015     DDD (degenerative disc disease), lumbar 9/25/2015     Diffuse idiopathic pulmonary fibrosis (H) 5/6/2013     Encounter for palliative care 5/18/2015     History of blood transfusion 9/29/2015     Hypertension goal BP (blood pressure) < 140/90 9/7/2012     Hypothyroid 9/7/2012     Irritable bowel syndrome 10/29/2013     Macular degeneration      Macular degeneration, 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     Nondisplaced spiral fracture of shaft of humerus      Osteoporosis 8/13/2013     Imo Update utility     RA (rheumatoid arthritis) (H) 5/7/2013     Rheumatic fever      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 2/20/2019     Shingles      Spinal stenosis of lumbar region with neurogenic claudication 9/14/2015       FAMILY HISTORY:  Family History   Problem Relation Age of Onset     Hypertension Mother      Psychotic Disorder Father      Diabetes Son      Diabetes Daughter      Blood Disease Daughter        SOCIAL HISTORY:  Social History     Socioeconomic History     Marital status:      Spouse name: None     Number of children: None     Years of education: None     Highest education level: None   Occupational History     None   Social Needs     Financial resource strain: None     Food insecurity:     Worry: None     Inability: None     Transportation needs:     Medical: None     Non-medical: None   Tobacco Use     Smoking status: Never Smoker     Smokeless tobacco: Never Used   Substance and Sexual Activity     Alcohol use: Yes     Comment: rare wine      Drug use: No     Sexual activity: No   Lifestyle     Physical activity:     Days per week: 0 days     Minutes per session: 0 min     Stress: None   Relationships     Social connections:     Talks on phone: None     Gets together: None     Attends [employer]ism service: None     Active member of club or organization: None     Attends meetings of clubs or  organizations: None     Relationship status: None     Intimate partner violence:     Fear of current or ex partner: None     Emotionally abused: None     Physically abused: None     Forced sexual activity: None   Other Topics Concern     Parent/sibling w/ CABG, MI or angioplasty before 65F 55M? No   Social History Narrative    . Has six children. She enjoys [employer] and [employer].  passed away.  Her son has financial and alcohol issues.       CURRENT MEDICATIONS:  Current Outpatient Medications   Medication Sig Dispense Refill     apixaban ANTICOAGULANT (ELIQUIS) 2.5 MG tablet Take 1 tablet (2.5 mg) by mouth 2 times daily Stop taking 2/9 (last dose evening of Feb 8, 2019) in preparation for MitraClip Procedure on Monday 60 tablet 0     aspirin (ASA) 81 MG EC tablet Take 1 tablet (81 mg) by mouth daily 90 tablet 0     cefdinir (OMNICEF) 300 MG capsule Take 1 capsule (300 mg) by mouth daily 7 capsule 0     denosumab (PROLIA) 60 MG/ML SOLN injection Inject 1 mL (60 mg) Subcutaneous every 6 months 1 mL      diphenhydrAMINE (BENADRYL) 25 MG tablet Take 25 mg by mouth as needed Patient takes 25-50mg 1-2 times a day.       furosemide (LASIX) 20 MG tablet Take 1 tablet (20 mg) by mouth 2 times daily 40mg by mouth every morning and 20mg by mouth every afternoon 270 tablet 3     hydrALAZINE (APRESOLINE) 10 MG tablet Take 1 tablet (10 mg) by mouth 3 times daily 90 tablet 3     isosorbide mononitrate (IMDUR) 60 MG 24 hr tablet Take 1 tablet (60 mg) by mouth daily 90 tablet 0     order for DME Dispense SP Walker-small 1 each 0     order for DME Equipment being ordered: Dispense baffle, for use with nebulizer. 1 each 0     order for DME Equipment being ordered: Nebulizer. Use with Albuterol. 1 each 0     order for DME Equipment being ordered: Dispense face mask.  Mrs. Spicer is immunosuppressed due to rheumatoid arthritis. 1 [address] 11     ranitidine (ZANTAC) 150 MG tablet Take 1 tablet (150 mg) by mouth At Bedtime        senna-docusate (SENOKOT-S/PERICOLACE) 8.6-50 MG tablet Take 1 tablet by mouth daily as needed for constipation 100 tablet 3     trimethoprim (TRIMPEX) 100 MG tablet Take 0.5 tablets (50 mg) by mouth daily 45 tablet 0     albuterol (PROVENTIL) (2.5 MG/3ML) 0.083% neb solution Take 1 vial (2.5 mg) by nebulization every 6 hours as needed for shortness of breath / dyspnea or wheezing 360 mL      azaTHIOprine (IMURAN) 50 MG tablet Take 1 tablet (50 mg) by mouth daily (Patient taking differently: Take 50 mg by mouth every evening ) 30 tablet 0     carvedilol (COREG) 3.125 MG tablet Take 1 tablet (3.125 mg) by mouth 2 times daily (with meals) 60 tablet 0     levothyroxine (SYNTHROID/LEVOTHROID) 75 MCG tablet Take 1 tablet (75 mcg) by mouth daily 30 tablet 0     nitroGLYcerin (NITROSTAT) 0.4 MG sublingual tablet Place 1 tablet (0.4 mg) under the tongue every 5 minutes as needed for chest pain 25 tablet 11       ROS:  Constitutional: Denies fever, chills, or diaphoresis.  +weight gain.   ENT: +macular degeneration     Cardiovascular: As per HPI.   GI: +constipation and diarrhea  : Denies urinary frequency, dysuria, or hematuria.   Integument: Negative for bruising, rash, or pruritis.  Psychiatric: +anxiety and depression  Neuro: Negative for headaches, syncope, numbness or tingling.   Endocrinology: +thyroid disorder  Musculoskeletal: +gait instability    EXAM:  /59 (BP Location: Left arm, Patient Position: Sitting, Cuff Size: Adult Large)   Pulse 66   Wt 73.9 kg (163 lb)   LMP  (LMP Unknown)   SpO2 98%   BMI 28.42 kg/m     General: alert, articulate, and in no acute distress.  HEENT: normocephalic, atraumatic, anicteric sclera, EOMI,  mucosa moist, no cyanosis.    Neck: neck supple. JVP 10 cm with +HJR   Heart: Irregular rhythm, normal S1/S2, no murmur, gallop, rub.  Precordium quiet with normal PMI.     Lungs: clear, no rales, ronchi, or wheezing.  No accessory muscle use, respirations unlabored.   Abdomen:  soft, non-tender, bowel sounds present, no hepatomegaly  Extremities: Trace edema.  No cyanosis.  Extremities warm to touch.  Neurological: Alert and oriented x 3.  Normal speech and affect, no gross motor deficits  Skin:  No ecchymoses, rashes, or clubbing.       Labs:  CBC RESULTS:  Lab Results   Component Value Date    WBC 6.4 2019    RBC 3.35 (L) 2019    HGB 10.9 (L) 2019    HCT 34.0 (L) 2019     (H) 2019    MCH 32.5 2019    MCHC 32.1 2019    RDW 14.0 2019     2019       CMP RESULTS:  Lab Results   Component Value Date     2019    POTASSIUM 3.9 2019    CHLORIDE 106 2019    CO2 29 2019    ANIONGAP 5 2019     (H) 2019    BUN 44 (H) 2019    CR 1.62 (H) 2019    GFRESTIMATED 28 (L) 2019    GFRESTBLACK 33 (L) 2019    FATOUMATA 8.0 (L) 2019    BILITOTAL 0.4 2019    ALBUMIN 3.1 (L) 2019    ALKPHOS 47 2019    ALT 16 2019    AST 20 2019        INR RESULTS:  Lab Results   Component Value Date    INR 1.12 2019       No components found for: CK  Lab Results   Component Value Date    MAG 2.1 2019     Lab Results   Component Value Date    NTBNP 3,154 (H) 10/29/2018       Most recent echocardiogram:  Recent Results (from the past 4320 hour(s))   Echo limited (Adults Only)    Narrative    [id number]  [id number]  [id number]  [id number]^CHAD^YANDY^T           [facility],[place]  Echocardiography Laboratory  [address] [place] [address]     Name: RG WALTER  MRN: [id number]  : 1931  Study Date: 2018 11:24 AM  Age: 87 yrs  Gender: Female  Patient Location: [facility] [place]  Reason For Study: Mitral Valve Disorder  Ordering Physician: YANDY BONILLA  Performed By: Elver Smith RDCS     BSA: 1.7 m2  Height: 63 in  Weight: 156 lb  BP: 114/63  mmHg  _____________________________________________________________________________  __        Procedure  Limited Portable Echo Adult.  _____________________________________________________________________________  __        Interpretation Summary  Limited study.  Normal biventricular size and systolic function. The Ejection Fraction is  estimated at 55-60%.  Mild to moderate mitral insufficiency is present.  Mild to moderate tricuspid insufficiency is present.  Estimated pulmonary artery systolic pressure is 26 mmHg plus right atrial  pressure.  Compared to ECHO on 10/9, there are no significant changes. Mitral  regurgitation less prominent compared to recent YOLANDA (10/16).     _____________________________________________________________________________  __        Left Ventricle  Left ventricular size is normal. There is increased LV wall thickness. The  Ejection Fraction is estimated at 55-60%. No regional wall motion  abnormalities are seen.     Right Ventricle  The right ventricle is normal size. Global right ventricular function is  normal.     Atria  Moderate right atrial enlargement is present. Moderate left atrial enlargement  is present.        Mitral Valve  Mild to moderate mitral insufficiency is present.     Aortic Valve  Trace aortic insufficiency is present.     Tricuspid Valve  Mild to moderate tricuspid insufficiency is present. Estimated pulmonary  artery systolic pressure is 26 mmHg plus right atrial pressure. There are  multiple TR jets.     Pulmonic Valve  The pulmonic valve cannot be assessed.     Vessels  The inferior vena cava is normal. The thoracic aorta cannot be assessed.     Pericardium  No pericardial effusion is present.        Compared to Previous Study  Compared to ECHO on 10/9, there are no significant changes. MR on YOLANDA (10/16)  appears more severe.  _____________________________________________________________________________  __           Doppler Measurements & Calculations  TV max  P.5 mmHg  TR max adrian: 252.4 cm/sec  TR max P.5 mmHg     _____________________________________________________________________________  __           Report approved by: Blanca Adams 2018 02:40 PM      ECHO COMPLETE WITH OPTISON    Narrative    [id number]  ECH73  [id number]  [id number]^DINAH^CRYS           [facility],[place]  Echocardiography Laboratory  [address] [place] [address]     Name: RG WALTER  MRN: [id number]  : 1931  Study Date: 10/09/2018 01:09 PM  Age: 87 yrs  Gender: Female  Patient [place] [facility] [place]  Reason For Study: CHF  History: CHF  Ordering Physician: CRYS NIX  Referring Physician: ERIC ENGLE  Performed By: Sharonda Galo RDCS     BSA: 1.8 m2  Height: 63 in  Weight: 164 lb  BP: 180/87 mmHg  _____________________________________________________________________________  __        Procedure  Complete Portable Echo Adult. Contrast Optison. Optison (NDC #4124-4742-23)  given intravenously. Patient was given 6 ml mixture of 3 ml Optison and 6 ml  saline. 3 ml wasted.  _____________________________________________________________________________  __        Interpretation Summary  Global and regional left ventricular function is normal with an EF of 55-60%.  The right ventricle is not well visualized.  Mild to moderate tricuspid insufficiency is present.  There is mild pulmonary hypertension.  The inferior vena cava was normal in size.  This study was compared with the study from 14 .  There has been no change.     _____________________________________________________________________________  __        Left Ventricle  Global and regional left ventricular function is normal with an EF of 55-60%.  Left ventricular size is normal. Left ventricular wall thickness is normal.  Left ventricular diastolic function is indeterminate.     Right Ventricle  Likely normal RV function but unable to assess size. The right  ventricle is  not well visualized.     Atria  Both atria appear normal. The atrial septum is intact as assessed by color  Doppler .     Mitral Valve  The mitral valve is normal. Mild to moderate mitral insufficiency is present.        Aortic Valve  Aortic valve is normal in structure and function. The aortic valve is  tricuspid. Mild aortic insufficiency is present.     Tricuspid Valve  Mild to moderate tricuspid insufficiency is present. Right ventricular  systolic pressure is 40mmHg above the right atrial pressure. Mild (pulmonary  artery systolic pressure<50mmHg) pulmonary hypertension is present.     Pulmonic Valve  The pulmonic valve is normal.     Vessels  The aorta root is normal. The thoracic aorta is normal. The pulmonary artery  is normal. The inferior vena cava was normal in size with preserved  respiratory variability. Estimated mean right atrial pressure is 3 mmHg  (normal).     Pericardium  No pericardial effusion is present.        Compared to Previous Study  This study was compared with the study from 14 . There has been no  change.     Attestation  I have personally viewed the imaging and agree with the interpretation and  report as documented by the fellow, Parris Duvall, and/or edited by me.  _____________________________________________________________________________  __     MMode/2D Measurements & Calculations  IVSd: 1.1 cm  LVIDd: 3.7 cm  LVIDs: 1.9 cm  LVPWd: 1.0 cm  FS: 47.7 %  LV mass(C)d: 122.1 grams  LV mass(C)dI: 68.7 grams/m2  Ao root diam: 2.7 cm  LA dimension: 4.3 cm  asc Aorta Diam: 3.4 cm  LA/Ao: 1.6  LVOT diam: 2.0 cm  LVOT area: 3.1 cm2  LA Volume (BP): 51.3 ml     LA Volume Index (BP): 28.8 ml/m2  RWT: 0.54        Doppler Measurements & Calculations  MV E max adrian: 104.0 cm/sec  MV A max adrian: 33.8 cm/sec  MV E/A: 3.1  MV dec time: 0.14 sec  Ao V2 max: 89.2 cm/sec  Ao max PG: 3.0 mmHg  Ao V2 mean: 66.3 cm/sec  Ao mean P.0 mmHg  Ao V2 VTI: 16.6 cm  RAJ(I,D): 3.5 cm2  RAJ(V,D):  3.2 cm2  LV V1 max PG: 3.3 mmHg  LV V1 max: 90.7 cm/sec  LV V1 VTI: 18.7 cm  SV(LVOT): 58.7 ml  SI(LVOT): 33.1 ml/m2  PA acc time: 0.06 sec  TR max alvarado: 242.0 cm/sec  TR max P.7 mmHg  AV Alvarado Ratio (DI): 1.0  RAJ Index (cm2/m2): 2.0  Medial E/e': 19.5        _____________________________________________________________________________  __        Report approved by: Blanca Adams 10/09/2018 04:28 PM            Assessment and Plan:    In summary, this is a very pleasant 87 year old with HFpEF who appears mildly hypervolemic secondary to dietary indiscretion.  I have increased her Lasix to 40 mg in the am and 20 mg in the evening for the next 3-4 days until her weight returns to baseline, then she will resume her baseline Lasix dose of 20 mg twice daily.  She was instructed to increase her potassium rich foods while on the higher diuretic. She will see Dr. Lynn in April as scheduled, and will follow up in CORE clinic in 6 months or sooner if sx worsen.  1. HFpEF:   Stage C  NYHA CLASS IIIa:  Symptoms with minimal activity, no dyspnea at rest  BP: controlled 114/59  Fluid status hypervolemic, diurese as outlined above.   Aldosterone antagonist: contraindicated due to renal dysfunction  Ischemia evaluation: Negative dobutamine stress test in   ACEi/ARB -  None  BB - Carvedilol 3.125 mg twice daily.   SCD prophylaxis - N/A, EF is normal   NSAID use: Contraindicated, reviewed with patient   Sleep Apnea Evaluation: None.  Refused CPAP or re-evaluation.   Remote Monitoring:  None.     2.  Atrial Fibrillation:  Anticoagulation: Apixaban 2.5 mg twice daily    Rate control:  Coreg 3.125 mg twice daily.  ChadsVasc Score: 4  HR 66.     3.  Tachy-Jadiel syndrome s/p PPM:  Device check as scheduled next month with Dr. Lynn.      4.  CKD  Stage IV:  Creatinine  1.62 today.  Previously 1.76. Baseline per record review is 1.4.  Diurese as outlined above.      5. Mitral regurgitation:   s/p Mitraclip.   Followed by Dr. Loza.  Follow up with him in 6 months per previous recommendations.      6.  Follow-up:  Dr. Lynn in April with device check.  CORE in 6 months. Dr. Loza in 6 months.        Karla MOELLER, CNP, CHFN  [facility]

## 2019-03-28 NOTE — PATIENT INSTRUCTIONS
"You were seen today in the Cardiovascular Clinic with the [facility] at [facility].     Cardiology Providers you saw during your visit: Karla MOELLER CNP      Follow up and medication changes:    1. Increase Lasix to 40 mg in the morning, 20 mg in the afternoon until weight back to baseline.  Then go back to 20 mg twice a day.    2. Increase intake of potassium rich foods (see attached)  3. Return to CORE Clinic in 6 months.       Results for RG WALTER (MRN [id number]) as of 3/28/2019 09:53   Ref. Range 3/26/2019 09:42   Sodium Latest Ref Range: 133 - 144 mmol/L 140   Potassium Latest Ref Range: 3.4 - 5.3 mmol/L 3.9   Chloride Latest Ref Range: 94 - 109 mmol/L 106   Carbon Dioxide Latest Ref Range: 20 - 32 mmol/L 29   Urea Nitrogen Latest Ref Range: 7 - 30 mg/dL 44 (H)   Creatinine Latest Ref Range: 0.52 - 1.04 mg/dL 1.62 (H)   GFR Estimate Latest Ref Range: >60 mL/min/1.73_m2 28 (L)   GFR Estimate If Black Latest Ref Range: >60 mL/min/1.73_m2 33 (L)   Calcium Latest Ref Range: 8.5 - 10.1 mg/dL 8.0 (L)   Anion Gap Latest Ref Range: 3 - 14 mmol/L 5   Glucose Latest Ref Range: 70 - 99 mg/dL 109 (H)         Please limit your fluid intake to 2 L (68 ounces) daily.  2 Liters a day = 8.5 cups, or 68 ounces.  Please limit your salt intake to 2 grams a day or less.     If you gain 2# in 24 hours or 5# in one week call Lizett Zapata RN so we can adjust your medications as needed over the phone.     Please feel free to call me with any questions or concerns.       Lizett Zapata RN CHFN  [facility]  Cardiology Care Coordinator-Heart Failure Clinic     Questions and [phone].   First press #1 for the University and then press #3 for \"Medical Questions\" to reach us Cardiology Nurses.      On Call Cardiologist for after hours or on weekends: [phone]   option #4 and ask to speak to the on-call Cardiologist. Inform them you are a CORE/heart failure " patient at the [facility].           If you need a medication refill please contact your pharmacy.  Please allow 3 business days for your refill to be completed.  _______________________________________________________  C.O.R.E. CLINIC Cardiomyopathy, Optimization, Rehabilitation, Education   The C.O.R.E. CLINIC is a heart failure specialty clinic within the [facility] where you will work with specialized nurse practitioners dedicated to helping patients with heart failure carefully adjust medications, receive education, and learn who and when to call if symptoms develop. They specialize in helping you better understand your condition, slow the progression of your disease, improve the length and quality of your life, help you detect future heart problems before they become life threatening, and avoid hospitalizations.  As always, thank you for trusting us with your health care needs!     Patient Education     Potassium-Rich Foods  The normal adult diet usually contains 2,000 mg to 4,000 mg of potassium per day. More potassium is needed when you lose too much potassium from your body. This can happen if you have diarrhea or vomiting. It can also happen if you take a medicine to make you urinate more (diuretic). To increase the amount of potassium in your diet, include these high-potassium foods.     [The (*) indicates foods highest in potassium.]  Vegetables  Artichokes. Cooked 1/2 cup, 200 mg to 300 mg*  Asparagus. Cooked 1/2 cup, 200 mg to 300 mg  Beans. White, red, hyatt cooked 1/2 cup, 300 mg to 500 mg*  Beets. Cooked 1/2 cup, 200 mg to 300 mg  Broccoli. Cooked or raw 1 cup, 200 mg to 500 mg*  [place] sprouts. Cooked 1/2 cup, 200 mg to 300 mg  Cabbage. Raw 1 cup, 100 mg to 200 mg  Carrots. Raw or cooked 1/2 cup, 100 mg to 200 mg  Celery. Raw 1 cup, 200 mg to 300 mg  Lima beans. Fresh or frozen 1/2 cup, 300 mg to 500 mg*   Mushrooms. Raw or cooked 1/2 cup, 100 mg to 300  mg  Peas. Cooked 1/2 cup, 150 mg to 250 mg   Potatoes. Baked 1 medium, 500 mg to 900 mg*   Spinach. Cooked 1 cup, 800 mg to 900 mg*   Spinach. Raw 2 cups, 300 mg to 400 mg *  Squash, winter. Fresh, frozen, or cooked 1/2 cup, 200 mg to 400 mg   Tomato. Fresh 1 medium, 200 mg to 300 mg   Tomato juice. Canned 1/2 cup, 200 mg to 300 mg   Fruits  Apple juice. Unsweetened 1 cup, 200 mg to 300 mg   Apricots. Canned 1/2 cup, 200 mg to 300 mg   Apricots. Dried 4 pieces, 100 mg to 200 mg   Avocado. Raw 1/2 cup, 300 mg to 400 mg*  Banana. Fresh 1 small, 300 mg to 400 mg*   Cantaloupe. Fresh 1 cup diced, 300 mg to 400 mg*   Grape juice. Unsweetened 1 cup, 200 mg to 300 mg   Honeydew melon. Fresh 1 cup diced, 300 mg to 400 mg*   Orange. Fresh 1 medium, 200 mg to 300 mg    Orange juice. Unsweetened, fresh or frozen 1/2 cup, 200 mg to 300 mg  Pineapple juice. Unsweetened 1 cup, 300 mg to 400 mg   Prune juice. Unsweetened 1/2 cup, 300 mg to 400 mg*   Prunes. Dried 5 pieces, 300 mg to 400 mg*   Strawberries. Fresh or frozen 1 cup, 200 mg to 300 mg  Meat  Red meat. Cooked 3 ounces, 100 mg to 300 mg   Seafood  Cod, flounder, halibut. Cooked 3 ounces, 100 mg to 300 mg*  [place]. Cooked, 3 ounces 300 mg to 400 mg*   Scallops. Cooked 3 ounces, 200 mg to 300 mg*  Shrimp. Cooked 3/4 cup, 100 mg to 200 mg   Tuna. Fresh or canned 3/4 cup, 200 mg to 500 mg   Date Last Reviewed: 10/1/2016    [id number] [employer]. [address] [place] [id number]. All rights reserved. This information is not intended as a substitute for professional medical care. Always follow your healthcare professional's instructions.

## 2019-03-28 NOTE — TELEPHONE ENCOUNTER
Routing refill request to provider for review/approval because:  Last Rx in medication list was Transitional class:  Outpatient Medication Detail      Disp Refills Start End ROGERS   levothyroxine (SYNTHROID/LEVOTHROID) 75 MCG tablet 30 tablet 0 1/20/2019 2/20/2019 No   Sig - Route: Take 1 tablet (75 mcg) by mouth daily - Oral   Class: Transitional   Order: [id number]     Ana Arzate RN

## 2019-04-01 ENCOUNTER — TELEPHONE (OUTPATIENT)
Dept: UROLOGY | Facility: CLINIC | Age: 84
End: 2019-04-01

## 2019-04-01 NOTE — TELEPHONE ENCOUNTER
Contacted [employer] to discuss. Representative states that pt needs to provider receipt of medication and submit form to [employer]. Writer asked representative what was needed from the clinic, representative stated nothing was needed at this time. Called and relayed message to patient. Pt stated she was told something different and will call [employer] tomorrow asking for clarification. Pt states she will call clinic if anything further is needed.     Minna Armenta LPN

## 2019-04-01 NOTE — TELEPHONE ENCOUNTER
Compound Rx    [facility] Call Center    Phone Message    May a detailed message be left on voicemail: yes    Reason for Call: Medication Question or concern regarding medication   Prescription Clarification  Name of Medication: Patient state for the compound Rx Versapro cream needs a medical necessity form completed since PA was denied. Patient is requesting to have it ran through with her [employer] insurance.     [employer]-Insurance number:   [phone]    Patient had [employer] insurance last year.     Patient is also requesting another PA/medical form of necessity to also be sent to [employer] for patient to be reimbursed for medication for 2018. Please reach out [employer] ph: [phone] for account/member ID number [id number]      Prescribing Provider: Asia Steven    Action Taken: Message routed to:  Adult Clinics: Urology p [id number]

## 2019-04-01 NOTE — TELEPHONE ENCOUNTER
Returned call to pt. Pt stated a reimbursement form is sent with compounded estrogen cream that patient receives every three months. Pt states she submitted form but insurance denied refund. Pt requesting clinic call insurance to provider further information on necessity for medication. Writer contacted [facility] pharmacy to obtain copy of reimbursement form.    Minna Armenta LPN

## 2019-04-02 NOTE — Clinical Note
Lasha Zambrano MD as Urologist (Urology)  Marvin Duncan [phone]) at [place]   • BPH with obstruction/lower urinary tract symptoms     psa=2.04  11/11   • Cholangitis     recurrent; multi MD's, [facility] eval Whipple  Procedure is scheduled in [facility].   High, who follows; [facility] Dr Villa Whipple, [facility]   • Whipple procedure w/ laparoscopy  10/

## 2019-04-04 DIAGNOSIS — N95.2 ATROPHIC VAGINITIS: Primary | ICD-10-CM

## 2019-04-04 NOTE — TELEPHONE ENCOUNTER
Faxed refill request from: [facility]  Medication request: Estriol 1 mg/GM Vaginal CR  Sig: Place 1 gram vaginally daily for 2 weeks, then twice weekly thereafter  Last filled: 1/4/2019  Last Qty: 30  Pt's last office visit: 2/15/19  Next scheduled office visit: None    Rx pended and routed to RNCC for review and approval if appropriate.

## 2019-04-04 NOTE — TELEPHONE ENCOUNTER
Chart reviewed and refill approved per refill protocol. Compounded estrogen cream ordered and sent for Asia Steven PA-C to review and sign. Prescription successfully faxed to [facility] pharmacy at [phone].    Sena Chapman RN, BSN

## 2019-04-05 DIAGNOSIS — I10 HYPERTENSION GOAL BP (BLOOD PRESSURE) < 150/90: ICD-10-CM

## 2019-04-05 RX ORDER — ISOSORBIDE MONONITRATE 60 MG/1
60 TABLET, EXTENDED RELEASE ORAL DAILY
Qty: 90 TABLET | Refills: 1 | Status: SHIPPED | OUTPATIENT
Start: 2019-04-05 | End: 2019-12-23

## 2019-04-07 ENCOUNTER — OFFICE VISIT (OUTPATIENT)
Dept: URGENT CARE | Facility: URGENT CARE | Age: 84
End: 2019-04-07
Payer: MEDICARE

## 2019-04-07 ENCOUNTER — NURSE TRIAGE (OUTPATIENT)
Dept: NURSING | Facility: CLINIC | Age: 84
End: 2019-04-07

## 2019-04-07 ENCOUNTER — APPOINTMENT (OUTPATIENT)
Dept: LAB | Facility: CLINIC | Age: 84
End: 2019-04-07
Payer: MEDICARE

## 2019-04-07 VITALS
WEIGHT: 163.4 LBS | OXYGEN SATURATION: 98 % | DIASTOLIC BLOOD PRESSURE: 69 MMHG | BODY MASS INDEX: 28.49 KG/M2 | SYSTOLIC BLOOD PRESSURE: 133 MMHG | TEMPERATURE: 97.9 F | HEART RATE: 69 BPM

## 2019-04-07 DIAGNOSIS — R39.15 URINARY URGENCY: ICD-10-CM

## 2019-04-07 DIAGNOSIS — R39.11 URINARY HESITANCY: ICD-10-CM

## 2019-04-07 DIAGNOSIS — R82.90 ABNORMAL URINE FINDINGS: ICD-10-CM

## 2019-04-07 DIAGNOSIS — R82.90 FOUL SMELLING URINE: Primary | ICD-10-CM

## 2019-04-07 LAB
ALBUMIN UR-MCNC: NEGATIVE MG/DL
APPEARANCE UR: CLEAR
BACTERIA #/AREA URNS HPF: ABNORMAL /HPF
BILIRUB UR QL STRIP: NEGATIVE
COLOR UR AUTO: YELLOW
GLUCOSE UR STRIP-MCNC: NEGATIVE MG/DL
HGB UR QL STRIP: NEGATIVE
KETONES UR STRIP-MCNC: NEGATIVE MG/DL
LEUKOCYTE ESTERASE UR QL STRIP: ABNORMAL
MUCOUS THREADS #/AREA URNS LPF: PRESENT /LPF
NITRATE UR QL: NEGATIVE
NON-SQ EPI CELLS #/AREA URNS LPF: ABNORMAL /LPF
PH UR STRIP: 6 PH (ref 5–7)
RBC #/AREA URNS AUTO: ABNORMAL /HPF
SOURCE: ABNORMAL
SP GR UR STRIP: 1.01 (ref 1–1.03)
UROBILINOGEN UR STRIP-ACNC: 0.2 EU/DL (ref 0.2–1)
WBC #/AREA URNS AUTO: ABNORMAL /HPF

## 2019-04-07 PROCEDURE — 81001 URINALYSIS AUTO W/SCOPE: CPT | Performed by: PHYSICIAN ASSISTANT

## 2019-04-07 PROCEDURE — 87086 URINE CULTURE/COLONY COUNT: CPT | Performed by: PHYSICIAN ASSISTANT

## 2019-04-07 PROCEDURE — 99213 OFFICE O/P EST LOW 20 MIN: CPT | Performed by: PHYSICIAN ASSISTANT

## 2019-04-07 RX ORDER — CEFDINIR 300 MG/1
300 CAPSULE ORAL DAILY
Qty: 10 CAPSULE | Refills: 0 | Status: SHIPPED | OUTPATIENT
Start: 2019-04-07 | End: 2019-06-20

## 2019-04-07 NOTE — PROGRESS NOTES
S: 87-year-old female presents for evaluation of foul-smelling urine that occurred last night.  She is reported terminating her urinary urgency and hesitancy.  No dysuria.  She had a urinary tract infection 3/19/2019.  Culture showed Proteus mirabilis that it would be covered by the Cefdinir.  She was given 7 days of Ceftin ear but missed a couple of days so it took her 9 days to take it.  Past medical history is significant for rheumatoid arthritis with with pulmonary fibrosis.  Pacemaker.  She is on Eliquis blood thinner.  Congestive heart failure.  She had a clip placement on her mitral valve in February to prevent any backwash of blood.  She states she has been having normal bowel movements today.  No flank pain.  No blood in her urine.  Does feel tired.  No fever.    No cough or cold symptoms.  No headache.      Allergies   Allergen Reactions     Cephalexin Diarrhea and GI Disturbance     Other reaction(s): GI Upset       Cephalexin Hcl Diarrhea     Gabapentin Other (See Comments)     Dizzsiness  Shaky, dizzy, dry mouth  Dizzsiness  Shaky,dry mouth       Methotrexate Other (See Comments)     Depleted Folic Acid  And caused severe leg cramps  Severe leg cramps     Naproxen GI Disturbance, Other (See Comments) and Nausea     Constipation. Tolerates ibuprofen.       Perfume      Sulfa Drugs Shortness Of Breath     Throat swelling     Alprazolam Other (See Comments)     Dizziness      Lactase Other (See Comments) and Unknown     Per pt - pt has lactose intolerance and cannot drink milk. Pt can tolerate other dairy products.      Levofloxacin GI Disturbance     Macrobid [Nitrofurantoin Anhydrous]      Possibly related to lung disease      Nitrofurantoin      Possibly related to lung disease      Seasonal Allergies      Ciprofloxacin Itching and Rash       Past Medical History:   Diagnosis Date     Adjustment disorder with anxious mood 5/18/2015     Advanced directives, counseling/discussion 8/30/2012    Patient  states has Advance Directive and will bring in a copy to clinic. 8/30/2012   Tevin [facility] Medical Assistant \       Anemia 9/25/2015     Basal cell cancer      CHF (congestive heart failure) (H) 9/18/2014     CKD (chronic kidney disease) stage 3, GFR 30-59 ml/min (H) 9/29/2015     DDD (degenerative disc disease), lumbar 9/25/2015     Diffuse idiopathic pulmonary fibrosis (H) 5/6/2013     Encounter for palliative care 5/18/2015     History of blood transfusion 9/29/2015     Hypertension goal BP (blood pressure) < 140/90 9/7/2012     Hypothyroid 9/7/2012     Irritable bowel syndrome 10/29/2013     Macular degeneration      Macular degeneration, 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     Nondisplaced spiral fracture of shaft of humerus      Osteoporosis 8/13/2013     Imo Update utility     RA (rheumatoid arthritis) (H) 5/7/2013     Rheumatic fever      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 2/20/2019     Shingles      Spinal stenosis of lumbar region with neurogenic claudication 9/14/2015         Current Outpatient Medications on File Prior to Visit:  apixaban ANTICOAGULANT (ELIQUIS) 2.5 MG tablet Take 1 tablet (2.5 mg) by mouth 2 times daily Stop taking 2/9 (last dose evening of Feb 8, 2019) in preparation for MitraClip Procedure on Monday   aspirin (ASA) 81 MG EC tablet Take 1 tablet (81 mg) by mouth daily   cefdinir (OMNICEF) 300 MG capsule Take 1 capsule (300 mg) by mouth daily   COMPOUNDED NON-CONTROLLED SUBSTANCE (CMPD RX) - PHARMACY TO MIX COMPOUNDED MEDICATION Estriol 1mg/gram. Place 1 gram vaginally daily for 2 weeks. Then vaginally twice weekly   denosumab (PROLIA) 60 MG/ML SOLN injection Inject 1 mL (60 mg) Subcutaneous every 6 months   diphenhydrAMINE (BENADRYL) 25 MG tablet Take 25 mg by mouth as needed Patient takes 25-50mg 1-2 times a day.   furosemide (LASIX) 20 MG tablet Take 1 tablet (20 mg) by mouth 2 times daily   hydrALAZINE (APRESOLINE) 10 MG tablet Take 1 tablet (10 mg) by mouth 3 times daily    isosorbide mononitrate (IMDUR) 60 MG 24 hr tablet Take 1 tablet (60 mg) by mouth daily   levothyroxine (SYNTHROID/LEVOTHROID) 75 MCG tablet Take 1 tablet (75 mcg) by mouth daily   order for DME Dispense TIESHA Curran   order for DME Equipment being ordered: Dispense baffle, for use with nebulizer.   order for DME Equipment being ordered: Nebulizer. Use with Albuterol.   order for DME Equipment being ordered: Dispense face mask.Mrs. Spicer is immunosuppressed due to rheumatoid arthritis.   ranitidine (ZANTAC) 150 MG tablet Take 1 tablet (150 mg) by mouth At Bedtime   senna-docusate (SENOKOT-S/PERICOLACE) 8.6-50 MG tablet Take 1 tablet by mouth daily as needed for constipation   trimethoprim (TRIMPEX) 100 MG tablet Take 0.5 tablets (50 mg) by mouth daily   [] abatacept (ORENCIA) 250 MG injection Inject 750 mg into the vein every 28 days   [] acetaminophen (TYLENOL) 500 MG tablet Take 1 tablet (500 mg) by mouth every 6 hours as needed for mild pain or fever   albuterol (PROVENTIL) (2.5 MG/3ML) 0.083% neb solution Take 1 vial (2.5 mg) by nebulization every 6 hours as needed for shortness of breath / dyspnea or wheezing   azaTHIOprine (IMURAN) 50 MG tablet Take 1 tablet (50 mg) by mouth daily (Patient taking differently: Take 50 mg by mouth every evening )   [] calcium carbonate-vitamin D (OS-FATOUMATA) 500-400 MG-UNIT tablet Take 1 tablet by mouth daily   [] Carboxymethylcellulose Sod PF (CELLUVISC/REFRESH LIQUIGEL) 1 % ophthalmic gel Place 1 drop into both eyes daily as needed for dry eyes   carvedilol (COREG) 3.125 MG tablet Take 1 tablet (3.125 mg) by mouth 2 times daily (with meals)   [] cetirizine (ZYRTEC ALLERGY) 10 MG tablet Take 1 tablet (10 mg) by mouth At Bedtime (Patient not taking: Reported on 2019)   [] fish oil-omega-3 fatty acids 1000 MG capsule Take 1 capsule (1 g) by mouth 2 times daily   [] folic acid (FOLVITE) 1 MG tablet Take 1 tablet (1 mg) by mouth  daily   [] Multiple Vitamins-Minerals (PRESERVISION AREDS) CAPS Take 1 capsule by mouth 2 times daily   nitroGLYcerin (NITROSTAT) 0.4 MG sublingual tablet Place 1 tablet (0.4 mg) under the tongue every 5 minutes as needed for chest pain   [] ranibizumab (LUCENTIS) 0.3 MG/0.05ML SOLN 0.05 mLs (0.3 mg) by Intravitreal route See Admin Instructions Every 6 weeks   [] saccharomyces boulardii (FLORASTOR) 250 MG capsule Take 1 capsule (250 mg) by mouth daily   [] vitamin C (ASCORBIC ACID) 500 MG tablet Take 1 tablet (500 mg) by mouth daily     No current facility-administered medications on file prior to visit.     Social History     Tobacco Use     Smoking status: Never Smoker     Smokeless tobacco: Never Used   Substance Use Topics     Alcohol use: Yes     Comment: rare wine        ROS:  CONSTITUTIONAL: Negative for  fever.  EYES: Negative for eye problems.  ENT: Negative .  RESP: Negative   CV: Negative for chest pains.  GI: Negative for vomiting.  MUSCULOSKELETAL:  Negative for significant muscle or joint pains.  NEUROLOGIC: Negative for headaches.  SKIN: Negative for rash.  -as above    OBJECTIVE:  /69 (BP Location: Right arm, Patient Position: Chair, Cuff Size: Adult Regular)   Pulse 69   Temp 97.9  F (36.6  C) (Oral)   Wt 74.1 kg (163 lb 6.4 oz)   LMP  (LMP Unknown)   SpO2 98%   BMI 28.49 kg/m    GENERAL APPEARANCE: Healthy, alert and no distress.  EYES:Conjunctiva/sclera clear.  RESP: Lungs with decreased breath sounds throughout.    CV: Regular rate and rhythm.  NEURO: Awake, alert    SKIN: No rashes  Abdomen-mild suprapubic tenderness.  Normal active bowel sounds.  No rebound, guarding or rigidity.  Back-no CVA tenderness    Results for orders placed or performed in visit on 19   UA with Microscopic reflex to Culture   Result Value Ref Range    Color Urine Yellow     Appearance Urine Clear     Glucose Urine Negative NEG^Negative mg/dL    Bilirubin Urine Negative  NEG^Negative    Ketones Urine Negative NEG^Negative mg/dL    Specific Gravity Urine 1.010 1.003 - 1.035    pH Urine 6.0 5.0 - 7.0 pH    Protein Albumin Urine Negative NEG^Negative mg/dL    Urobilinogen Urine 0.2 0.2 - 1.0 EU/dL    Nitrite Urine Negative NEG^Negative    Blood Urine Negative NEG^Negative    Leukocyte Esterase Urine Trace (A) NEG^Negative    Source Midstream Urine     WBC Urine 0 - 5 OTO5^0 - 5 /HPF    RBC Urine O - 2 OTO2^O - 2 /HPF    Squamous Epithelial /LPF Urine Few FEW^Few /LPF    Bacteria Urine Few (A) NEG^Negative /HPF    Mucous Urine Present (A) NEG^Negative /LPF     *Note: Due to a large number of results and/or encounters for the requested time period, some results have not been displayed. A complete set of results can be found in Results Review.       ASSESSMENT:     ICD-10-CM    1. Foul smelling urine R82.90 UA with Microscopic reflex to Culture     cefdinir (OMNICEF) 300 MG capsule     Urine Culture Aerobic Bacterial   2. Urinary urgency R39.15 UA with Microscopic reflex to Culture     cefdinir (OMNICEF) 300 MG capsule   3. Urinary hesitancy R39.11 UA with Microscopic reflex to Culture     cefdinir (OMNICEF) 300 MG capsule   4. Abnormal urine findings R82.90            PLAN: Addendum: UC pending. ? Early UTI, states she waited too long last time. If UC comes back neg may stop antibiotic.    She was unable to give us a urine sample here.  She only lives a couple miles from here.  She will get the urine sample and her daughter will bring it in immediately.  Lots of rest and fluids.  RTC if any worsening symptoms or if not improving.    Jaki Amezquita PA-C

## 2019-04-08 LAB
BACTERIA SPEC CULT: NORMAL
SPECIMEN SOURCE: NORMAL

## 2019-04-09 ENCOUNTER — TELEPHONE (OUTPATIENT)
Dept: UROLOGY | Facility: CLINIC | Age: 84
End: 2019-04-09

## 2019-04-09 DIAGNOSIS — Z95.0 CARDIAC PACEMAKER IN SITU: Primary | ICD-10-CM

## 2019-04-09 NOTE — TELEPHONE ENCOUNTER
[facility] Call Center    Phone Message    May a detailed message be left on voicemail: yes    Reason for Call: Symptoms or Concerns     If patient has red-flag symptoms, warm transfer to triage line    Current symptom or concern: possible UTI, chills, stomach pains    Symptoms have been present for:  3 day(s)    Has patient previously been seen for this? Yes    Please call daughter, Xi, to discuss. Xi is with pt. Pt is scheduled for this Friday 4/12.    Action Taken: Message routed to Urology

## 2019-04-09 NOTE — TELEPHONE ENCOUNTER
"Called and spoke to daughter Xi and patient. Per patient, she was seen at urgent care on Sunday for lower abdominal soreness and tenderness. Per daughter, a urine sample was done at urgent care and patient was started on omnicef. Per patient, \"It is like labor pains.\" Per daughter, temperature is currently 98.4. Patient denies dysuria, urinary urgency, urinary frequency, and nausea/vomiting. Patient reports decreased appetite but she has been eating fruits and vegetable smoothies. Per patient, the lower abdominal pain has been off and on since the fall. Patient stated, \"After I took senna this morning I had 2 small hard stools and there was some bright blood with it.\" patient is scheduled to see Asia Steven PA-C for follow up on 4/12/19 but is wondering if Asia Steven PA-C has any othe recommendations. Per patient, she has not established care with a GI provider.    Informed patient to call or seek care if severe pain that is uncontrolled at home and if fever. Patient aware that she will be contacted with recommendations. Patient comfortable with plan.    Sena Chapman RN, BSN    "

## 2019-04-09 NOTE — TELEPHONE ENCOUNTER
Asia Steven PA-C  You 1 hour ago (12:41 PM)   Follow up with PCP regarding chronic abdominal pain and constipation.   Thanks,   Asia Steven PA-C      Received the above message from Asia Steven PA-C. Called and spoke with daughter Xi who is aware of the above information. Per Xi, they will contact Dr. Lockett's office and/or call to schedule with [facility] GI clinic. Appointment on 4/12/19 with Asia Steven PA-C cancelled per daughter request.    Sena Chapman RN, BSN

## 2019-04-18 ENCOUNTER — TELEPHONE (OUTPATIENT)
Dept: UROLOGY | Facility: CLINIC | Age: 84
End: 2019-04-18

## 2019-04-18 NOTE — TELEPHONE ENCOUNTER
Returned call to patient, pt relayed number for [employer] so writer could call in regards to claim reimbursement.    [phone]    Minna Armenta LPN

## 2019-04-18 NOTE — TELEPHONE ENCOUNTER
[facility] Call Center    Phone Message    May a detailed message be left on voicemail: yes    Reason for Call: Other: Patient states she is returning clinics call regarding medication issues that she is having with her insurance covering them. Please call to advise.     Action Taken: Message routed to:  Adult Clinics: Urology p [id number]

## 2019-04-23 ENCOUNTER — INFUSION THERAPY VISIT (OUTPATIENT)
Dept: INFUSION THERAPY | Facility: CLINIC | Age: 84
End: 2019-04-23
Payer: MEDICARE

## 2019-04-23 ENCOUNTER — PATIENT OUTREACH (OUTPATIENT)
Dept: CARE COORDINATION | Facility: CLINIC | Age: 84
End: 2019-04-23

## 2019-04-23 VITALS
RESPIRATION RATE: 18 BRPM | OXYGEN SATURATION: 99 % | SYSTOLIC BLOOD PRESSURE: 153 MMHG | DIASTOLIC BLOOD PRESSURE: 82 MMHG | WEIGHT: 164.2 LBS | HEART RATE: 71 BPM | TEMPERATURE: 98.3 F | BODY MASS INDEX: 28.63 KG/M2

## 2019-04-23 DIAGNOSIS — M05.79 RHEUMATOID ARTHRITIS INVOLVING MULTIPLE SITES WITH POSITIVE RHEUMATOID FACTOR (H): Primary | ICD-10-CM

## 2019-04-23 PROCEDURE — 99207 ZZC NO CHARGE LOS: CPT

## 2019-04-23 PROCEDURE — 96365 THER/PROPH/DIAG IV INF INIT: CPT | Performed by: NURSE PRACTITIONER

## 2019-04-23 ASSESSMENT — PAIN SCALES - GENERAL: PAINLEVEL: NO PAIN (0)

## 2019-04-23 ASSESSMENT — ACTIVITIES OF DAILY LIVING (ADL): DEPENDENT_IADLS:: COOKING;CLEANING;LAUNDRY;SHOPPING;MEDICATION MANAGEMENT;MONEY MANAGEMENT;TRANSPORTATION

## 2019-04-23 NOTE — PROGRESS NOTES
Clinic Care Coordination Contact    Clinic Care Coordination Contact  OUTREACH    Referral Information:  Referral Source: IP Report    Primary Diagnosis: CHF    Chief Complaint   Patient presents with     Clinic Care Coordination - Follow-up     RN        Universal Utilization: Patient is followed by cardiology, rheumatology, urology, endocrinology and pulmonology     Clinic Utilization  Difficulty keeping appointments:: No  Compliance Concerns: No  No-Show Concerns: No  No PCP office visit in Past Year: No  Utilization    Last refreshed: 4/23/2019  1:35 PM:  Hospital Admissions 6           Last refreshed: 4/23/2019  1:35 PM:  ED Visits 1           Last refreshed: 4/23/2019  1:35 PM:  No Show Count (past year) 2              Current as of: 4/23/2019  1:35 PM              Clinical Concerns:  Current Medical Concerns:  Patient has discharged from home care.  Patient reports breathing is at baseline.  Patient reports abdominal cramping 2 weeks ago that resolved when she stopped taking her Senna.  Patient states she is cooking, doing laundry and taking out her garbage without difficulty.  Patient no longer feels any care coordination services are necessary.  Patient Active Problem List   Diagnosis     Hypertension goal BP (blood pressure) < 150/90     Hypothyroidism     Seropositive rheumatoid arthritis (H)     Macular degeneration, left eye     Irritable bowel syndrome     Encounter for palliative care     Adjustment disorder with anxious mood     Mild anemia     DDD (degenerative disc disease), lumbar     CKD (chronic kidney disease) stage 3, GFR 30-59 ml/min (H)     History of blood transfusion     Aftercare following surgery     S/P lumbar laminectomy     High risk medication use     Age-related osteoporosis without current pathological fracture     Atrophic vaginitis     Fecal incontinence     Female stress incontinence     Impingement syndrome of both shoulders     UIP (usual interstitial pneumonitis) (H)     High  risk medications (not anticoagulants) long-term use     Heart failure with preserved ejection fraction (H)     Congestive heart failure with preserved LV function, NYHA class 3 (H)     Other specified hypothyroidism     Rheumatoid arthritis involving multiple sites with positive rheumatoid factor (H)     Health Care Home     Sick sinus syndrome (H)     Cardiac pacemaker - Medtronic dual lead pacemaker - Not Dependent - MRI Safe     Immunosuppression (H)     ILD (interstitial lung disease) (H)     Heart failure with preserved ejection fraction, NYHA class I (H)     Atrial flutter (H)     Paroxysmal atrial fibrillation (H)     CHF exacerbation (H)     Pre-syncope     (HFpEF) heart failure with preserved ejection fraction (H)     Mitral regurgitation     SOB (shortness of breath)     Mitral valve insufficiency, unspecified etiology     Abnormal findings on diagnostic imaging of cardiovascular system     Status post coronary angiogram     Dyspnea     RUSSELL (dyspnea on exertion)     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     Swelling of right lower extremity          Current Behavioral Concerns: no concerns at this time      Education Provided to patient: RN CC reviewed that patient can contact care coordination services in the future if needed     Pain  Pain (GOAL):: No  Health Maintenance Reviewed: Due/Overdue   Health Maintenance Due   Topic Date Due     ZOSTER IMMUNIZATION (1 of 2) 06/[birth year]/1981     MEDICARE ANNUAL WELLNESS VISIT  05/28/2015     MICROALBUMIN Q1 YEAR  07/26/2018     PHQ-2  01/01/2019      Clinical Pathway: None    Medication Management:  Patient independent in medication management with oversight from her family.     Functional Status:  Dependent ADLs:: Bathing, Ambulation-walker  Dependent IADLs:: Cooking, Cleaning, Laundry, Shopping, Medication Management, Money Management, Transportation  Bed or wheelchair confined:: No  Mobility Status: Independent w/Device    Living Situation:  Current living  arrangement:: I live alone  Type of residence:: [facility]    Diet/Exercise/Sleep:  Diet:: Low cholesterol, Low saturated fat, No added salt  Inadequate nutrition (GOAL):: No  Food Insecurity: No  Tube Feeding: No  Exercise:: Currently not exercising  Inadequate activity/exercise (GOAL):: No  Significant changes in sleep pattern (GOAL): No    Transportation:  Transportation concerns (GOAL):: No  Transportation means:: Family, [place] mobility     Psychosocial:  Confucianism or spiritual beliefs that impact treatment:: No  Mental health DX:: No  Mental health management concern (GOAL):: No  Informal Support system:: [place] based, Family, Friends, Neighbors, Spouse     Financial/Insurance:   Financial/Insurance concerns (GOAL):: No       Resources and Interventions:  Current Resources:    ;   Community Resources: None  Supplies used at home:: None  Equipment Currently Used at Home: raised toilet, shower chair, walker, rolling, wheelchair, power    Advance Care Plan/Directive  Advanced Care Plans/Directives on file:: Yes  Type Advanced Care Plans/Directives: Advanced Directive - On File  Advanced Care Plan/Directive Status: Not Applicable    Referrals Placed: None     Goals: met        Patient/Caregiver understanding: Patient has good understanding of her current plan of care and has excellent support from her family.       Future Appointments              In 1 week DEVICE CHECK RN CARD [facility] [place] [facility]    In 1 week Andrews Lynn MD [facility] [place] [facility]    In 3 weeks  ICD [facility], [facility]    In 4 weeks Alva 8 [facility]ION [facility]    In 1 month Alva 10 [facility] [place]    In 2 months Mario Davenport MD [facility] [place]    [facility]    In 5 months  Karla Mabry, NP [facility], [facility]          Plan:   Patient will continue to follow treatment plan as directed and follow up with PCP and specialits with future concerns.   RN CC will perform no further outreaches at this time.    Melissa Behl BSN, RN, PHN  Primary Care Clinical RN Care Coordinator  [facility]   [phone]

## 2019-04-23 NOTE — PROGRESS NOTES
Infusion Nursing Note:  Linda Spicer presents today for Orencia.    Patient seen by provider today: No   present during visit today: Not Applicable.    Note: N/A.    Intravenous Access:  Peripheral IV placed.    Treatment Conditions:  Biological Infusion Checklist:    ~~~ NOTE: If the patient answers yes to any of the questions below, hold the infusion and contact ordering provider or on-call provider.    1. Have you recently had an elevated temperature, fever, chills, productive cough, coughing for 3 weeks or longer or hemoptysis,  abnormal vital signs, night sweats,  chest pain or have you noticed a decrease in your appetite, unexplained weight loss or fatigue? No  2. Do you have any open wounds or new incisions? No  3. Do you have any recent or upcoming hospitalizations, surgeries or dental procedures? No  4. Do you currently have or recently have had any signs of illness or infection or are you on any antibiotics? No  5. Have you had any new, sudden or worsening abdominal pain? No  6. Have you or anyone in your household received a live vaccination in the past 4 weeks? Please note:  No live vaccines while on biologic/chemotherapy until 6 months after the last treatment.  Patient can receive the flu vaccine (shot only) and the pneumovax.  It is optimal for the patient to get these vaccines mid cycle, but they can be given at any time as long as it is not on the day of the infusion. No  7. Have you recently been diagnosed with any new nervous system diseases (ie. Multiple sclerosis, Guillain Hettick, seizures, neurological changes) or cancer diagnosis? Are you on any form of radiation or chemotherapy? No  8. Are you pregnant or breast feeding or do you have plans of pregnancy in the future? No  9. Have you been having any signs of worsening depression or suicidal ideations?  (benlysta only) No  10. Have there been any other new onset medical symptoms? No    Post Infusion Assessment:  Patient tolerated  infusion without incident.  Site patent and intact, free from redness, edema or discomfort.  No evidence of extravasations.  Access discontinued per protocol.       Discharge Plan:   Patient will return 5/21/19 for next appointment.   Patient discharged in stable condition accompanied by: self and son.  Departure Mode: Ambulatory.    Trinity Varela RN

## 2019-05-01 ENCOUNTER — TELEPHONE (OUTPATIENT)
Dept: UROLOGY | Facility: CLINIC | Age: 84
End: 2019-05-01

## 2019-05-01 NOTE — TELEPHONE ENCOUNTER
Returned call to pt. Pt asking if writer received update on claims requested. Informed pt no update has been received. Pt stated understanding and will wait to hear from insurance.    Minna Armenta LPN

## 2019-05-01 NOTE — TELEPHONE ENCOUNTER
[facility] Call Center    Phone Message    May a detailed message be left on voicemail: yes    Reason for Call: Other: Patient returning phone call. States she will be around her phone after 330pm today.     Action Taken: Message routed to:  Adult Clinics: Urology p [id number]

## 2019-05-02 NOTE — TELEPHONE ENCOUNTER
Writer called [employer] and was informed claim form was never received. Writer refaxed form to [phone]. Writer refaxed form to [employer] as well. Called and left pt message informing of update and it could take up to 5 business days to receive another update. Left number for pt to call if any questions or concerns. Will update pt next week with any new information.    Minna Armenta LPN

## 2019-05-06 ENCOUNTER — ANCILLARY PROCEDURE (OUTPATIENT)
Dept: CARDIOLOGY | Facility: CLINIC | Age: 84
End: 2019-05-06
Attending: INTERNAL MEDICINE
Payer: MEDICARE

## 2019-05-06 ENCOUNTER — OFFICE VISIT (OUTPATIENT)
Dept: CARDIOLOGY | Facility: CLINIC | Age: 84
End: 2019-05-06
Payer: MEDICARE

## 2019-05-06 VITALS
SYSTOLIC BLOOD PRESSURE: 123 MMHG | OXYGEN SATURATION: 98 % | BODY MASS INDEX: 28.42 KG/M2 | HEART RATE: 65 BPM | WEIGHT: 163 LBS | DIASTOLIC BLOOD PRESSURE: 62 MMHG

## 2019-05-06 DIAGNOSIS — I48.19 PERSISTENT ATRIAL FIBRILLATION (H): ICD-10-CM

## 2019-05-06 DIAGNOSIS — Z95.0 CARDIAC PACEMAKER IN SITU: Primary | ICD-10-CM

## 2019-05-06 DIAGNOSIS — Z95.0 CARDIAC PACEMAKER IN SITU: ICD-10-CM

## 2019-05-06 DIAGNOSIS — Z98.890 STATUS POST IMPLANTATION OF MITRAL VALVE LEAFLET CLIP: ICD-10-CM

## 2019-05-06 DIAGNOSIS — Z95.818 STATUS POST IMPLANTATION OF MITRAL VALVE LEAFLET CLIP: ICD-10-CM

## 2019-05-06 PROCEDURE — 93280 PM DEVICE PROGR EVAL DUAL: CPT | Performed by: INTERNAL MEDICINE

## 2019-05-06 PROCEDURE — 99214 OFFICE O/P EST MOD 30 MIN: CPT | Mod: 24 | Performed by: INTERNAL MEDICINE

## 2019-05-06 NOTE — LETTER
5/6/2019      RE: Linda Spicer  [address]  [address]  [facility] [address]       Dear Colleague,    Thank you for the opportunity to participate in the care of your patient, Linda Spicer, at the [facility] AT [facility]. Please see a copy of my visit note below.    HPI:  Linda Spicer presents for follow-up although I have never met her before. 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 She has had several cardioversions in the past (6/2018, 9/14/2018, 10/11/2019, 10/16/2018). She saw Dr. Carrasquillo when he was on the Cardiology inpatient service and she had been admitted overnight (November 17-18, 2019) presyncope that was felt to be vasovagal and orthostatic in nature. She saw Dr. Carrasquillo in clinic 12/19/2018 with diastolic heart failure at which time she was in sinus rhythm but had a 65% AF burden by PPM check. She complained of dizziness, RUSSELL, fatigue and was noted to have severe MR. She went on to have a mireille-clip procedure 2/11/2019. She saw Karla Howell 3/28/2019 at which time she was feeling better overall. She apparently wanted to know what measure might be used to maintain sinus rhythm.     She is on apixaban for stroke prophylaxis, carvedilol 3.125 mg twice daily, hydralazine 10 mg 3 times daily, Lasix 20 mg twice daily, Imdur 60 mg daily.     She has been on propafenone (2017 to 2018), and amiodarone (in spite of a history of rheumatic pulmonary fibrosis). Amiodarone was discontinued 11/27/2018 for dyspnea, and for lack of efficacy (74% AF burden in spite of the drug) and three consecutive device reports indicating AF/AFl for days at a time without symptoms). Dronedarone was cost-prohibitive while sotalol and dofetilide have been avoided due to variable renal function.  Ablation has not been recommended due to her age.     She and her son and daughter confirm that she has felt MUCH better since her mireille-clip procedure.  They also report that she felt much better when cardioverted to sinus rhythm, even though it would only last 1 day to 1 week.  She denies chest pain, palpitations.  She gets lightheaded if she stands up quickly or if she bends over and stands up.  She had RUSSELL but not at rest.  When she has been in sinus rhythm her exercise tolerance is much better.    Echo 3/13/2019 shows normal LV systolic function (60-65%) and severe bi-atrial enlargement. There was trace to mild MR.    HR histogram for 5 Feb to 25 March 2019 shows excellent rate control while in AF (almost 100% of her heart beats being between 70 and 90 bpm).     Pacemaker evaluation today shows normal function.  Total  is 62.7% since December.  She has been in AF for > 100 days    She is only on very low dose carvedilol so there would be little benefit in stopping it to increase the % of narrow intrinsic QRS complexes (her baseline QRS is narrow).      PAST MEDICAL HISTORY:  Past Medical History:   Diagnosis Date     Adjustment disorder with anxious mood 5/18/2015     Advanced directives, counseling/discussion 8/30/2012    Patient states has Advance Directive and will bring in a copy to clinic. 8/30/2012   Tevin May  [facility] Medical Assistant \       Anemia 9/25/2015     Basal cell cancer      CHF (congestive heart failure) (H) 9/18/2014     CKD (chronic kidney disease) stage 3, GFR 30-59 ml/min (H) 9/29/2015     DDD (degenerative disc disease), lumbar 9/25/2015     Diffuse idiopathic pulmonary fibrosis (H) 5/6/2013     Encounter for palliative care 5/18/2015     History of blood transfusion 9/29/2015     Hypertension goal BP (blood pressure) < 140/90 9/7/2012     Hypothyroid 9/7/2012     Irritable bowel syndrome 10/29/2013     Macular degeneration      Macular degeneration, 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      Nondisplaced spiral fracture of shaft of humerus      Osteoporosis 8/13/2013     Imo Update utility     RA (rheumatoid arthritis) (H) 5/7/2013     Rheumatic fever      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 2/20/2019     Shingles      Spinal stenosis of lumbar region with neurogenic claudication 9/14/2015       CURRENT MEDICATIONS:  Current Outpatient Medications   Medication Sig Dispense Refill     apixaban ANTICOAGULANT (ELIQUIS) 2.5 MG tablet Take 1 tablet (2.5 mg) by mouth 2 times daily Stop taking 2/9 (last dose evening of Feb 8, 2019) in preparation for MitraClip Procedure on Monday 60 tablet 0     aspirin (ASA) 81 MG EC tablet Take 1 tablet (81 mg) by mouth daily 90 tablet 0     COMPOUNDED NON-CONTROLLED SUBSTANCE (CMPD RX) - PHARMACY TO MIX COMPOUNDED MEDICATION Estriol 1mg/gram. Place 1 gram vaginally daily for 2 weeks. Then vaginally twice weekly 30 g 6     denosumab (PROLIA) 60 MG/ML SOLN injection Inject 1 mL (60 mg) Subcutaneous every 6 months 1 mL      diphenhydrAMINE (BENADRYL) 25 MG tablet Take 25 mg by mouth as needed Patient takes 25-50mg 1-2 times a day.       furosemide (LASIX) 20 MG tablet Take 1 tablet (20 mg) by mouth 2 times daily 270 tablet 3     hydrALAZINE (APRESOLINE) 10 MG tablet Take 1 tablet (10 mg) by mouth 3 times daily 90 tablet 3     isosorbide mononitrate (IMDUR) 60 MG 24 hr tablet Take 1 tablet (60 mg) by mouth daily 90 tablet 1     levothyroxine (SYNTHROID/LEVOTHROID) 75 MCG tablet Take 1 tablet (75 mcg) by mouth daily 90 tablet 0     order for DME Dispense SP Walker-small 1 each 0     order for DME Equipment being ordered: Dispense baffle, for use with nebulizer. 1 each 0     order for DME Equipment being ordered: Nebulizer. Use with Albuterol. 1 each 0     order for DME Equipment being ordered: Dispense face mask.  Mrs. Spicer is immunosuppressed due to rheumatoid arthritis. 1 [address]     ranitidine (ZANTAC) 150 MG tablet Take 1 tablet (150 mg) by mouth At Bedtime        senna-docusate (SENOKOT-S/PERICOLACE) 8.6-50 MG tablet Take 1 tablet by mouth daily as needed for constipation 100 tablet 3     trimethoprim (TRIMPEX) 100 MG tablet Take 0.5 tablets (50 mg) by mouth daily 45 tablet 0     albuterol (PROVENTIL) (2.5 MG/3ML) 0.083% neb solution Take 1 vial (2.5 mg) by nebulization every 6 hours as needed for shortness of breath / dyspnea or wheezing 360 mL      azaTHIOprine (IMURAN) 50 MG tablet Take 1 tablet (50 mg) by mouth daily (Patient taking differently: Take 50 mg by mouth every evening ) 30 tablet 0     carvedilol (COREG) 3.125 MG tablet Take 1 tablet (3.125 mg) by mouth 2 times daily (with meals) 60 tablet 0     cefdinir (OMNICEF) 300 MG capsule Take 1 capsule (300 mg) by mouth daily (Patient not taking: Reported on 5/6/2019) 7 capsule 0     nitroGLYcerin (NITROSTAT) 0.4 MG sublingual tablet Place 1 tablet (0.4 mg) under the tongue every 5 minutes as needed for chest pain 25 tablet 11       PAST SURGICAL HISTORY:  Past Surgical History:   Procedure Laterality Date     ANESTHESIA CARDIOVERSION N/A 9/14/2018    Procedure: ANESTHESIA CARDIOVERSION;;  Surgeon: GENERIC ANESTHESIA PROVIDER;  Location: [facility] OR     ANESTHESIA CARDIOVERSION N/A 10/11/2018    Procedure: ANESTHESIA CARDIOVERSION;  Cardioversion;  Surgeon: GENERIC ANESTHESIA PROVIDER;  Location: [facility] OR     ANESTHESIA CARDIOVERSION N/A 10/16/2018    Procedure: Anesthesia Cardioversion ;  Surgeon: GENERIC ANESTHESIA PROVIDER;  Location:  OR     APPENDECTOMY       BIOPSY      hemorrhoidectomy     CV HEART CATHETERIZATION WITH POSSIBLE INTERVENTION N/A 1/31/2019    Procedure: CORS,LHC,RHC-YOLANDA BEFORE CATH APPOINTMENT;  Surgeon: Avila Watson MD;  Location:  HEART CARDIAC CATH LAB     CV MITRACLIP N/A 2/11/2019    Procedure: Mitraclip Procedure;  Surgeon: Avila Watson MD;  Location: [facility] OR     ENT SURGERY      tonsillectomy     GYN SURGERY      3 D & C's     HYSTERECTOMY, PAP NO LONGER INDICATED       LAMINECTOMY  LUMBAR ONE LEVEL N/A 10/13/2015    Procedure: LAMINECTOMY LUMBAR ONE LEVEL;  Surgeon: Fransico Toussaint MD;  [facility]       ALLERGIES:     Allergies   Allergen Reactions     Cephalexin Diarrhea and GI Disturbance     Other reaction(s): GI Upset       Cephalexin Hcl Diarrhea     Gabapentin Other (See Comments)     Dizzsiness  Shaky, dizzy, dry mouth  Dizzsiness  Shaky,dry mouth       Methotrexate Other (See Comments)     Depleted Folic Acid  And caused severe leg cramps  Severe leg cramps     Naproxen GI Disturbance, Other (See Comments) and Nausea     Constipation. Tolerates ibuprofen.       Perfume      Sulfa Drugs Shortness Of Breath     Throat swelling     Alprazolam Other (See Comments)     Dizziness      Lactase Other (See Comments) and Unknown     Per pt - pt has lactose intolerance and cannot drink milk. Pt can tolerate other dairy products.      Levofloxacin GI Disturbance     Macrobid [Nitrofurantoin Anhydrous]      Possibly related to lung disease      Nitrofurantoin      Possibly related to lung disease      Seasonal Allergies      Ciprofloxacin Itching and Rash       FAMILY HISTORY:  Family History   Problem Relation Age of Onset     Hypertension Mother      Psychotic Disorder Father      Diabetes Son      Diabetes Daughter      Blood Disease Daughter        SOCIAL HISTORY:  Social History     Tobacco Use     Smoking status: Never Smoker     Smokeless tobacco: Never Used   Substance Use Topics     Alcohol use: Yes     Comment: rare wine      Drug use: No       ROS:   Constitutional: No fever, chills, or sweats. Weight stable.   ENT: No visual disturbance, ear ache, epistaxis, sore throat.   Cardiovascular: As per HPI.   Respiratory: No cough, hemoptysis.    GI: No nausea, vomiting, hematemesis, melena, or hematochezia.   : No hematuria.   Integument: Negative.   Psychiatric: Negative.   Hematologic:  Easy bruising, no easy bleeding.  Neuro: Negative.   Endocrinology: No significant heat or  cold intolerance   Musculoskeletal: No myalgia.    Exam:  /62 (BP Location: Left arm, Patient Position: Sitting, Cuff Size: Adult Large)   Pulse 65   Wt 73.9 kg (163 lb)   LMP  (LMP Unknown)   SpO2 98%   BMI 28.42 kg/m     No acute distress.  Alert and oriented.  HEENT:  Normocephalic, atraumatic, sclera non-icteric, EOM intact, mucosa moist  Neck: No lymphadenopathy.  JVP 7 cm without HJR.  Cor: RRR. Distant S1 and S2.  I do not hear a murmur.  No rub, or gallop.  PMI in Lf 5th ICS.  Lungs:  Clear  Abd: Soft, nontender, bowel sounds present.  No hepatosplenomegaly..  Extremities: No C/C.  Trace edema.  SKIN: no ecchymoses, no rashes    Labs:  CBC RESULTS:   Lab Results   Component Value Date    WBC 6.4 2019    RBC 3.35 (L) 2019    HGB 10.9 (L) 2019    HCT 34.0 (L) 2019     (H) 2019    MCH 32.5 2019    MCHC 32.1 2019    RDW 14.0 2019     2019       BMP RESULTS:  Lab Results   Component Value Date     2019    POTASSIUM 3.9 2019    CHLORIDE 106 2019    CO2 29 2019    ANIONGAP 5 2019     (H) 2019    BUN 44 (H) 2019    CR 1.62 (H) 2019    GFRESTIMATED 28 (L) 2019    GFRESTBLACK 33 (L) 2019    FATOUMATA 8.0 (L) 2019        INR RESULTS:  Lab Results   Component Value Date    INR 1.12 2019    INR 1.15 (H) 2019    INR 1.31 (H) 2019    INR 1.40 (H) 2018       Procedures:  Echocardiogram:   Recent Results (from the past 8760 hour(s))   Echo limited (Adults Only)    Narrative    [id number]  [id number]  [id number]  [id number]^CHAD^YANDY^JORGE L           [facility],[place]  Echocardiography Laboratory  [address] [place] [id number]     Name: RG WALTER  MRN: [id number]  : 1931  Study Date: 2018 11:24 AM  Age: 87 yrs  Gender: Female  Patient Location: [facility] [place]  Reason For Study: Mitral Valve Disorder  Ordering  Physician: YANDY BONILLA  Performed By: Elver Smith RDCS     BSA: 1.7 m2  Height: 63 in  Weight: 156 lb  BP: 114/63 mmHg  _____________________________________________________________________________  __        Procedure  Limited Portable Echo Adult.  _____________________________________________________________________________  __        Interpretation Summary  Limited study.  Normal biventricular size and systolic function. The Ejection Fraction is  estimated at 55-60%.  Mild to moderate mitral insufficiency is present.  Mild to moderate tricuspid insufficiency is present.  Estimated pulmonary artery systolic pressure is 26 mmHg plus right atrial  pressure.  Compared to ECHO on 10/9, there are no significant changes. Mitral  regurgitation less prominent compared to recent YOLANDA (10/16).     _____________________________________________________________________________  __        Left Ventricle  Left ventricular size is normal. There is increased LV wall thickness. The  Ejection Fraction is estimated at 55-60%. No regional wall motion  abnormalities are seen.     Right Ventricle  The right ventricle is normal size. Global right ventricular function is  normal.     Atria  Moderate right atrial enlargement is present. Moderate left atrial enlargement  is present.        Mitral Valve  Mild to moderate mitral insufficiency is present.     Aortic Valve  Trace aortic insufficiency is present.     Tricuspid Valve  Mild to moderate tricuspid insufficiency is present. Estimated pulmonary  artery systolic pressure is 26 mmHg plus right atrial pressure. There are  multiple TR jets.     Pulmonic Valve  The pulmonic valve cannot be assessed.     Vessels  The inferior vena cava is normal. The thoracic aorta cannot be assessed.     Pericardium  No pericardial effusion is present.        Compared to Previous Study  Compared to ECHO on 10/9, there are no significant changes. MR on YOLANDA (10/16)  appears more  severe.  _____________________________________________________________________________  __           Doppler Measurements & Calculations  TV max P.5 mmHg  TR max adrian: 252.4 cm/sec  TR max P.5 mmHg     _____________________________________________________________________________  __           Report approved by: Blanca Adams 2018 02:40 PM      ECHO COMPLETE WITH OPTISON    Narrative    [id number]  [id number]  [id number]  [id number]^DINAH^CRYS           [facility],[place]  Echocardiography Laboratory  [address] [place] [address]     Name: RG WALTER  MRN: [id number]  : 1931  Study Date: 10/09/2018 01:09 PM  Age: 87 yrs  Gender: Female  [place] [facility] [place]  Reason For Study: CHF  History: CHF  Ordering Physician: CRYS NIX  Referring Physician: ERIC ENGLE  Performed By: hSaronda Galo RDCS     BSA: 1.8 m2  Height: 63 in  Weight: 164 lb  BP: 180/87 mmHg  _____________________________________________________________________________  __        Procedure  Complete Portable Echo Adult. Contrast Optison. Optison (NDC #5879-9575-40)  given intravenously. Patient was given 6 ml mixture of 3 ml Optison and 6 ml  saline. 3 ml wasted.  _____________________________________________________________________________  __        Interpretation Summary  Global and regional left ventricular function is normal with an EF of 55-60%.  The right ventricle is not well visualized.  Mild to moderate tricuspid insufficiency is present.  There is mild pulmonary hypertension.  The inferior vena cava was normal in size.  This study was compared with the study from 14 .  There has been no change.     _____________________________________________________________________________  __        Left Ventricle  Global and regional left ventricular function is normal with an EF of 55-60%.  Left ventricular size is normal. Left ventricular wall thickness is  normal.  Left ventricular diastolic function is indeterminate.     Right Ventricle  Likely normal RV function but unable to assess size. The right ventricle is  not well visualized.     Atria  Both atria appear normal. The atrial septum is intact as assessed by color  Doppler .     Mitral Valve  The mitral valve is normal. Mild to moderate mitral insufficiency is present.        Aortic Valve  Aortic valve is normal in structure and function. The aortic valve is  tricuspid. Mild aortic insufficiency is present.     Tricuspid Valve  Mild to moderate tricuspid insufficiency is present. Right ventricular  systolic pressure is 40mmHg above the right atrial pressure. Mild (pulmonary  artery systolic pressure<50mmHg) pulmonary hypertension is present.     Pulmonic Valve  The pulmonic valve is normal.     Vessels  The aorta root is normal. The thoracic aorta is normal. The pulmonary artery  is normal. The inferior vena cava was normal in size with preserved  respiratory variability. Estimated mean right atrial pressure is 3 mmHg  (normal).     Pericardium  No pericardial effusion is present.        Compared to Previous Study  This study was compared with the study from 8/28/14 . There has been no  change.     Attestation  I have personally viewed the imaging and agree with the interpretation and  report as documented by the fellow, Parris Duvall, and/or edited by me.  _____________________________________________________________________________  __     MMode/2D Measurements & Calculations  IVSd: 1.1 cm  LVIDd: 3.7 cm  LVIDs: 1.9 cm  LVPWd: 1.0 cm  FS: 47.7 %  LV mass(C)d: 122.1 grams  LV mass(C)dI: 68.7 grams/m2  Ao root diam: 2.7 cm  LA dimension: 4.3 cm  asc Aorta Diam: 3.4 cm  LA/Ao: 1.6  LVOT diam: 2.0 cm  LVOT area: 3.1 cm2  LA Volume (BP): 51.3 ml     LA Volume Index (BP): 28.8 ml/m2  RWT: 0.54        Doppler Measurements & Calculations  MV E max adrian: 104.0 cm/sec  MV A max adrian: 33.8 cm/sec  MV E/A: 3.1  MV dec time:  0.14 sec  Ao V2 max: 89.2 cm/sec  Ao max PG: 3.0 mmHg  Ao V2 mean: 66.3 cm/sec  Ao mean P.0 mmHg  Ao V2 VTI: 16.6 cm  RAJ(I,D): 3.5 cm2  RAJ(V,D): 3.2 cm2  LV V1 max PG: 3.3 mmHg  LV V1 max: 90.7 cm/sec  LV V1 VTI: 18.7 cm  SV(LVOT): 58.7 ml  SI(LVOT): 33.1 ml/m2  PA acc time: 0.06 sec  TR max alvarado: 242.0 cm/sec  TR max P.7 mmHg  AV Alvarado Ratio (DI): 1.0  RAJ Index (cm2/m2): 2.0  Medial E/e': 19.5        _____________________________________________________________________________  __        Report approved by: Blanca Adams 10/09/2018 04:28 PM          Assessment and Plan:  Linda Spicer is a delightful 87-year old woman with persistent atrial fibrillation, a dual chamber PPM, rheumatic pulmonary fibrosis and is s/p mireille-clip with a marked improvement in her breathing and functional class. She has had several cardioversions after which she feels better but she has recurrence of AF within a few days.  She has been on propafenone (2017 to 2018), and amiodarone in the past. Amiodarone was discontinued 2018 for dyspnea, and for lack of efficacy (74% AF burden in spite of the drug). Dronedarone was cost-prohibitive while sotalol and dofetilide have been avoided due to variable renal function. Ablation has not been recommended due to her age and severe bi-atrial enlargement.  Echo 3/13/2019 is notable for normal LV systolic function (60-65%).     Our options are very limited.  I cannot identify a reasonable medication or procedure to improve her chances of maintaining sinus rhythm.  There may be some benefit in increasing her rate adaptive pacing as her symptoms come with exertion.  We will empirically increase this.  Decreasing her low-dose carvedilol will not substantially increase her intrinisic AV conduction (with a narrow vs. Paced QRS).  Likewise, I do not see any benefit in AV node ablation.  With a baseline narrow QRS complex and preserved systolic function (for now) we cannot expect any  favorable LV remodeling with CRT.  In theory her LV might perform better with LV synchrony in spite of a normal EF, but we lack data to predict such an outcome.  His bundle pacing may, likewise, have some symptomatic benefit, but it is not currently possible to predict outcome.  They have already been informed of my plans to retire next month.  Since she has seen Dr. Carrasquillo in the past we will arrange follow-up with him to see if she has any benefit from the simple pacemaker reprogramming we have tried today.  It was a pleasrure meeting Linda Spicer today and it was a privilege to participate in her medical care.  MOSHE Kent

## 2019-05-06 NOTE — PATIENT INSTRUCTIONS
It was a pleasure to see you in clinic today. Please do not hesitate to call with any questions or concerns. You are scheduled for remote pacemaker transmissions every 3 months. We will call in 1-2 business days to discuss the results with you.  We look forward to seeing you in clinic at your next device check in 1 year.    Kassandra Martinez, RN  Electrophysiology Nurse Clinician  [facility]  During business hours please call The Device Clinic:  [phone]  After business hours please call:  [phone]- select option #4 and ask for job code [phone]  Appointment lines  [facility]:  [phone]  [facility]:  [phone]

## 2019-05-06 NOTE — NURSING NOTE
"Chief Complaint   Patient presents with     Atrial Fib      4 month. follow up for Paroxysmal atrial fibrillation . - dr Per patient pacemaker questions,and fatigue and sob. occurs at times.       Initial /62 (BP Location: Left arm, Patient Position: Sitting, Cuff Size: Adult Large)   Pulse 65   Wt 73.9 kg (163 lb)   LMP  (LMP Unknown)   SpO2 98%   BMI 28.42 kg/m   Estimated body mass index is 28.42 kg/m  as calculated from the following:    Height as of 2/26/19: 1.613 m (5' 3.5\").    Weight as of this encounter: 73.9 kg (163 lb)..  BP completed using cuff size: large    Alexander MCKINNEYP.N.  Patient here with another person, IPV. Screen not initiated. Alexander Melendez LPN.      "

## 2019-05-06 NOTE — PATIENT INSTRUCTIONS
Thank you for coming to the [facility]; please note the following instructions:    1. Scheduled with Gregorio Pennington. For follow up, see next page for appointment detail        If you have any questions regarding your visit please contact your care team:     Cardiology  Telephone Number   Dina CHILD, RN  Kylie LOPEZ,RN  Marianne MCKINNEY, [place], [place]  Alexander MORLEY, LPN   [phone]    *After hours: [phone]   For scheduling appts:     [phone] or    [phone] (select option 1)    *After hours: [phone]     For the Device Clinic (Pacemakers and ICD's)  RN's :  Kassandra Rose   During business hours: [phone]    *After business hours:  [phone] (select option 4)      Normal test result notifications will be released via [employer] or mailed within 7 business days.  All other test results, will be communicated via telephone once reviewed by your cardiologist.    If you need a medication refill please contact your pharmacy.  Please allow 3 business days for your refill to be completed.    As always, thank you for trusting us with your health care needs!

## 2019-05-06 NOTE — PROGRESS NOTES
HPI:  Linda Spicer presents for follow-up although I have never met her before. 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 She has had several cardioversions in the past (6/2018, 9/14/2018, 10/11/2019, 10/16/2018). She saw Dr. Carrasquillo when he was on the Cardiology inpatient service and she had been admitted overnight (November 17-18, 2019) presyncope that was felt to be vasovagal and orthostatic in nature. She saw Dr. Carrasquillo in clinic 12/19/2018 with diastolic heart failure at which time she was in sinus rhythm but had a 65% AF burden by PPM check. She complained of dizziness, RUSSELL, fatigue and was noted to have severe MR. She went on to have a mireille-clip procedure 2/11/2019. She saw Karla Howell 3/28/2019 at which time she was feeling better overall. She apparently wanted to know what measure might be used to maintain sinus rhythm.     She is on apixaban for stroke prophylaxis, carvedilol 3.125 mg twice daily, hydralazine 10 mg 3 times daily, Lasix 20 mg twice daily, Imdur 60 mg daily.     She has been on propafenone (2017 to 2018), and amiodarone (in spite of a history of rheumatic pulmonary fibrosis). Amiodarone was discontinued 11/27/2018 for dyspnea, and for lack of efficacy (74% AF burden in spite of the drug) and three consecutive device reports indicating AF/AFl for days at a time without symptoms). Dronedarone was cost-prohibitive while sotalol and dofetilide have been avoided due to variable renal function. Ablation has not been recommended due to her age.     She and her son and daughter confirm that she has felt MUCH better since her mireille-clip procedure.  They also report that she felt much better when cardioverted to sinus rhythm, even though it would only last 1 day to 1 week.  She denies chest pain, palpitations.  She gets lightheaded if she stands up quickly or  if she bends over and stands up.  She had RUSSELL but not at rest.  When she has been in sinus rhythm her exercise tolerance is much better.    Echo 3/13/2019 shows normal LV systolic function (60-65%) and severe bi-atrial enlargement. There was trace to mild MR.    HR histogram for 5 Feb to 25 March 2019 shows excellent rate control while in AF (almost 100% of her heart beats being between 70 and 90 bpm).     Pacemaker evaluation today shows normal function.  Total  is 62.7% since December.  She has been in AF for > 100 days    She is only on very low dose carvedilol so there would be little benefit in stopping it to increase the % of narrow intrinsic QRS complexes (her baseline QRS is narrow).      PAST MEDICAL HISTORY:  Past Medical History:   Diagnosis Date     Adjustment disorder with anxious mood 5/18/2015     Advanced directives, counseling/discussion 8/30/2012    Patient states has Advance Directive and will bring in a copy to clinic. 8/30/2012   [facility] Medical Assistant \       Anemia 9/25/2015     Basal cell cancer      CHF (congestive heart failure) (H) 9/18/2014     CKD (chronic kidney disease) stage 3, GFR 30-59 ml/min (H) 9/29/2015     DDD (degenerative disc disease), lumbar 9/25/2015     Diffuse idiopathic pulmonary fibrosis (H) 5/6/2013     Encounter for palliative care 5/18/2015     History of blood transfusion 9/29/2015     Hypertension goal BP (blood pressure) < 140/90 9/7/2012     Hypothyroid 9/7/2012     Irritable bowel syndrome 10/29/2013     Macular degeneration      Macular degeneration, 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     Nondisplaced spiral fracture of shaft of humerus      Osteoporosis 8/13/2013     Imo Update utility     RA (rheumatoid arthritis) (H) 5/7/2013     Rheumatic fever      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 2/20/2019     Shingles      Spinal stenosis of lumbar region with neurogenic claudication 9/14/2015       CURRENT MEDICATIONS:  Current Outpatient Medications    Medication Sig Dispense Refill     apixaban ANTICOAGULANT (ELIQUIS) 2.5 MG tablet Take 1 tablet (2.5 mg) by mouth 2 times daily Stop taking 2/9 (last dose evening of Feb 8, 2019) in preparation for MitraClip Procedure on Monday 60 tablet 0     aspirin (ASA) 81 MG EC tablet Take 1 tablet (81 mg) by mouth daily 90 tablet 0     COMPOUNDED NON-CONTROLLED SUBSTANCE (CMPD RX) - PHARMACY TO MIX COMPOUNDED MEDICATION Estriol 1mg/gram. Place 1 gram vaginally daily for 2 weeks. Then vaginally twice weekly 30 g 6     denosumab (PROLIA) 60 MG/ML SOLN injection Inject 1 mL (60 mg) Subcutaneous every 6 months 1 mL      diphenhydrAMINE (BENADRYL) 25 MG tablet Take 25 mg by mouth as needed Patient takes 25-50mg 1-2 times a day.       furosemide (LASIX) 20 MG tablet Take 1 tablet (20 mg) by mouth 2 times daily 270 tablet 3     hydrALAZINE (APRESOLINE) 10 MG tablet Take 1 tablet (10 mg) by mouth 3 times daily 90 tablet 3     isosorbide mononitrate (IMDUR) 60 MG 24 hr tablet Take 1 tablet (60 mg) by mouth daily 90 tablet 1     levothyroxine (SYNTHROID/LEVOTHROID) 75 MCG tablet Take 1 tablet (75 mcg) by mouth daily 90 tablet 0     order for DME Dispense SP Walker-small 1 each 0     order for DME Equipment being ordered: Dispense baffle, for use with nebulizer. 1 each 0     order for DME Equipment being ordered: Nebulizer. Use with Albuterol. 1 each 0     order for DME Equipment being ordered: Dispense face mask.  Mrs. Spicer is immunosuppressed due to rheumatoid arthritis. 1 Box 11     ranitidine (ZANTAC) 150 MG tablet Take 1 tablet (150 mg) by mouth At Bedtime       senna-docusate (SENOKOT-S/PERICOLACE) 8.6-50 MG tablet Take 1 tablet by mouth daily as needed for constipation 100 tablet 3     trimethoprim (TRIMPEX) 100 MG tablet Take 0.5 tablets (50 mg) by mouth daily 45 tablet 0     albuterol (PROVENTIL) (2.5 MG/3ML) 0.083% neb solution Take 1 vial (2.5 mg) by nebulization every 6 hours as needed for shortness of breath / dyspnea or  wheezing 360 mL      azaTHIOprine (IMURAN) 50 MG tablet Take 1 tablet (50 mg) by mouth daily (Patient taking differently: Take 50 mg by mouth every evening ) 30 tablet 0     carvedilol (COREG) 3.125 MG tablet Take 1 tablet (3.125 mg) by mouth 2 times daily (with meals) 60 tablet 0     cefdinir (OMNICEF) 300 MG capsule Take 1 capsule (300 mg) by mouth daily (Patient not taking: Reported on 5/6/2019) 7 capsule 0     nitroGLYcerin (NITROSTAT) 0.4 MG sublingual tablet Place 1 tablet (0.4 mg) under the tongue every 5 minutes as needed for chest pain 25 tablet 11       PAST SURGICAL HISTORY:  Past Surgical History:   Procedure Laterality Date     ANESTHESIA CARDIOVERSION N/A 9/14/2018    Procedure: ANESTHESIA CARDIOVERSION;;  Surgeon: GENERIC ANESTHESIA PROVIDER;  Location: [facility] OR     ANESTHESIA CARDIOVERSION N/A 10/11/2018    Procedure: ANESTHESIA CARDIOVERSION;  Cardioversion;  Surgeon: GENERIC ANESTHESIA PROVIDER;  Location: [facility] OR     ANESTHESIA CARDIOVERSION N/A 10/16/2018    Procedure: Anesthesia Cardioversion ;  Surgeon: GENERIC ANESTHESIA PROVIDER;  Location: [facility] OR     APPENDECTOMY       BIOPSY      hemorrhoidectomy     CV HEART CATHETERIZATION WITH POSSIBLE INTERVENTION N/A 1/31/2019    Procedure: CORS,LHC,RHC-YOLANDA BEFORE CATH APPOINTMENT;  Surgeon: Avila Watson MD;  Location:  HEART CARDIAC CATH LAB     CV MITRACLIP N/A 2/11/2019    Procedure: Mitraclip Procedure;  Surgeon: Avila Watson MD;  Location: [facility] OR     ENT SURGERY      tonsillectomy     GYN SURGERY      3 D & C's     HYSTERECTOMY, PAP NO LONGER INDICATED       LAMINECTOMY LUMBAR ONE LEVEL N/A 10/13/2015    Procedure: LAMINECTOMY LUMBAR ONE LEVEL;  Surgeon: Fransico Toussaint MD;  Location: [facility] OR       ALLERGIES:     Allergies   Allergen Reactions     Cephalexin Diarrhea and GI Disturbance     Other reaction(s): GI Upset       Cephalexin Hcl Diarrhea     Gabapentin Other (See Comments)     Dizzsiness  Shaky, dizzy, dry  mouth  Dizzsiness  Shaky,dry mouth       Methotrexate Other (See Comments)     Depleted Folic Acid  And caused severe leg cramps  Severe leg cramps     Naproxen GI Disturbance, Other (See Comments) and Nausea     Constipation. Tolerates ibuprofen.       Perfume      Sulfa Drugs Shortness Of Breath     Throat swelling     Alprazolam Other (See Comments)     Dizziness      Lactase Other (See Comments) and Unknown     Per pt - pt has lactose intolerance and cannot drink milk. Pt can tolerate other dairy products.      Levofloxacin GI Disturbance     Macrobid [Nitrofurantoin Anhydrous]      Possibly related to lung disease      Nitrofurantoin      Possibly related to lung disease      Seasonal Allergies      Ciprofloxacin Itching and Rash       FAMILY HISTORY:  Family History   Problem Relation Age of Onset     Hypertension Mother      Psychotic Disorder Father      Diabetes Son      Diabetes Daughter      Blood Disease Daughter        SOCIAL HISTORY:  Social History     Tobacco Use     Smoking status: Never Smoker     Smokeless tobacco: Never Used   Substance Use Topics     Alcohol use: Yes     Comment: rare wine      Drug use: No       ROS:   Constitutional: No fever, chills, or sweats. Weight stable.   ENT: No visual disturbance, ear ache, epistaxis, sore throat.   Cardiovascular: As per HPI.   Respiratory: No cough, hemoptysis.    GI: No nausea, vomiting, hematemesis, melena, or hematochezia.   : No hematuria.   Integument: Negative.   Psychiatric: Negative.   Hematologic:  Easy bruising, no easy bleeding.  Neuro: Negative.   Endocrinology: No significant heat or cold intolerance   Musculoskeletal: No myalgia.    Exam:  /62 (BP Location: Left arm, Patient Position: Sitting, Cuff Size: Adult Large)   Pulse 65   Wt 73.9 kg (163 lb)   LMP  (LMP Unknown)   SpO2 98%   BMI 28.42 kg/m    No acute distress.  Alert and oriented.  HEENT:  Normocephalic, atraumatic, sclera non-icteric, EOM intact, mucosa  moist  Neck: No lymphadenopathy.  JVP 7 cm without HJR.  Cor: RRR. Distant S1 and S2.  I do not hear a murmur.  No rub, or gallop.  PMI in Lf 5th ICS.  Lungs:  Clear  Abd: Soft, nontender, bowel sounds present.  No hepatosplenomegaly..  Extremities: No C/C.  Trace edema.  SKIN: no ecchymoses, no rashes    Labs:  CBC RESULTS:   Lab Results   Component Value Date    WBC 6.4 2019    RBC 3.35 (L) 2019    HGB 10.9 (L) 2019    HCT 34.0 (L) 2019     (H) 2019    MCH 32.5 2019    MCHC 32.1 2019    RDW 14.0 2019     2019       BMP RESULTS:  Lab Results   Component Value Date     2019    POTASSIUM 3.9 2019    CHLORIDE 106 2019    CO2 29 2019    ANIONGAP 5 2019     (H) 2019    BUN 44 (H) 2019    CR 1.62 (H) 2019    GFRESTIMATED 28 (L) 2019    GFRESTBLACK 33 (L) 2019    FATOUMATA 8.0 (L) 2019        INR RESULTS:  Lab Results   Component Value Date    INR 1.12 2019    INR 1.15 (H) 2019    INR 1.31 (H) 2019    INR 1.40 (H) 2018       Procedures:  Echocardiogram:   Recent Results (from the past 8760 hour(s))   Echo limited (Adults Only)    Narrative    [id number]  [id number]  [id number]  [id number]^CHAD^YANDY^T           [facility],[place]  Echocardiography Laboratory  [address] [place] [address]     Name: RG WALTER  MRN: [id number]  : 1931  Study Date: 2018 11:24 AM  Age: 87 yrs  Gender: Female  Patient Location: [facility] [place]  Reason For Study: Mitral Valve Disorder  Ordering Physician: YANDY BONILLA  Performed By: Elver Smith RDCS     BSA: 1.7 m2  Height: 63 in  Weight: 156 lb  BP: 114/63 mmHg  _____________________________________________________________________________  __        Procedure  Limited Portable Echo Adult.  _____________________________________________________________________________  __         Interpretation Summary  Limited study.  Normal biventricular size and systolic function. The Ejection Fraction is  estimated at 55-60%.  Mild to moderate mitral insufficiency is present.  Mild to moderate tricuspid insufficiency is present.  Estimated pulmonary artery systolic pressure is 26 mmHg plus right atrial  pressure.  Compared to ECHO on 10/9, there are no significant changes. Mitral  regurgitation less prominent compared to recent YOLANDA (10/16).     _____________________________________________________________________________  __        Left Ventricle  Left ventricular size is normal. There is increased LV wall thickness. The  Ejection Fraction is estimated at 55-60%. No regional wall motion  abnormalities are seen.     Right Ventricle  The right ventricle is normal size. Global right ventricular function is  normal.     Atria  Moderate right atrial enlargement is present. Moderate left atrial enlargement  is present.        Mitral Valve  Mild to moderate mitral insufficiency is present.     Aortic Valve  Trace aortic insufficiency is present.     Tricuspid Valve  Mild to moderate tricuspid insufficiency is present. Estimated pulmonary  artery systolic pressure is 26 mmHg plus right atrial pressure. There are  multiple TR jets.     Pulmonic Valve  The pulmonic valve cannot be assessed.     Vessels  The inferior vena cava is normal. The thoracic aorta cannot be assessed.     Pericardium  No pericardial effusion is present.        Compared to Previous Study  Compared to ECHO on 10/9, there are no significant changes. MR on YOLANDA (10/16)  appears more severe.  _____________________________________________________________________________  __           Doppler Measurements & Calculations  TV max P.5 mmHg  TR max adrian: 252.4 cm/sec  TR max P.5 mmHg     _____________________________________________________________________________  __           Report approved by: Blanca Adams 2018 02:40  PM      ECHO COMPLETE WITH OPTISON    Narrative    [id number]  [id number]  [id number]  [id number]^DINAH^CRYS           [facility],[place]  Echocardiography Laboratory  [address] [place] [address]     Name: RG WALTER  MRN: [id number]  : 1931  Study Date: 10/09/2018 01:09 PM  Age: 87 yrs  Gender: Female  [place] [facility] [place]  Reason For Study: CHF  History: CHF  Ordering Physician: CRYS NIX  Referring Physician: ERIC ENGLE  Performed By: Sharonda Galo RDCS     BSA: 1.8 m2  Height: 63 in  Weight: 164 lb  BP: 180/87 mmHg  _____________________________________________________________________________  __        Procedure  Complete Portable Echo Adult. Contrast Optison. Optison (NDC #1256-1796-18)  given intravenously. Patient was given 6 ml mixture of 3 ml Optison and 6 ml  saline. 3 ml wasted.  _____________________________________________________________________________  __        Interpretation Summary  Global and regional left ventricular function is normal with an EF of 55-60%.  The right ventricle is not well visualized.  Mild to moderate tricuspid insufficiency is present.  There is mild pulmonary hypertension.  The inferior vena cava was normal in size.  This study was compared with the study from 14 .  There has been no change.     _____________________________________________________________________________  __        Left Ventricle  Global and regional left ventricular function is normal with an EF of 55-60%.  Left ventricular size is normal. Left ventricular wall thickness is normal.  Left ventricular diastolic function is indeterminate.     Right Ventricle  Likely normal RV function but unable to assess size. The right ventricle is  not well visualized.     Atria  Both atria appear normal. The atrial septum is intact as assessed by color  Doppler .     Mitral Valve  The mitral valve is normal. Mild to moderate mitral insufficiency is  present.        Aortic Valve  Aortic valve is normal in structure and function. The aortic valve is  tricuspid. Mild aortic insufficiency is present.     Tricuspid Valve  Mild to moderate tricuspid insufficiency is present. Right ventricular  systolic pressure is 40mmHg above the right atrial pressure. Mild (pulmonary  artery systolic pressure<50mmHg) pulmonary hypertension is present.     Pulmonic Valve  The pulmonic valve is normal.     Vessels  The aorta root is normal. The thoracic aorta is normal. The pulmonary artery  is normal. The inferior vena cava was normal in size with preserved  respiratory variability. Estimated mean right atrial pressure is 3 mmHg  (normal).     Pericardium  No pericardial effusion is present.        Compared to Previous Study  This study was compared with the study from 14 . There has been no  change.     Attestation  I have personally viewed the imaging and agree with the interpretation and  report as documented by the fellow, Parris Duvall, and/or edited by me.  _____________________________________________________________________________  __     MMode/2D Measurements & Calculations  IVSd: 1.1 cm  LVIDd: 3.7 cm  LVIDs: 1.9 cm  LVPWd: 1.0 cm  FS: 47.7 %  LV mass(C)d: 122.1 grams  LV mass(C)dI: 68.7 grams/m2  Ao root diam: 2.7 cm  LA dimension: 4.3 cm  asc Aorta Diam: 3.4 cm  LA/Ao: 1.6  LVOT diam: 2.0 cm  LVOT area: 3.1 cm2  LA Volume (BP): 51.3 ml     LA Volume Index (BP): 28.8 ml/m2  RWT: 0.54        Doppler Measurements & Calculations  MV E max alvarado: 104.0 cm/sec  MV A max alvarado: 33.8 cm/sec  MV E/A: 3.1  MV dec time: 0.14 sec  Ao V2 max: 89.2 cm/sec  Ao max PG: 3.0 mmHg  Ao V2 mean: 66.3 cm/sec  Ao mean P.0 mmHg  Ao V2 VTI: 16.6 cm  RAJ(I,D): 3.5 cm2  RAJ(V,D): 3.2 cm2  LV V1 max PG: 3.3 mmHg  LV V1 max: 90.7 cm/sec  LV V1 VTI: 18.7 cm  SV(LVOT): 58.7 ml  SI(LVOT): 33.1 ml/m2  PA acc time: 0.06 sec  TR max alvarado: 242.0 cm/sec  TR max P.7 mmHg  AV Alvarado Ratio (DI): 1.0  RAJ  Index (cm2/m2): 2.0  Medial E/e': 19.5        _____________________________________________________________________________  __        Report approved by: Blanca Adams 10/09/2018 04:28 PM          Assessment and Plan:  Linda Spicer is a delightful 87-year old woman with persistent atrial fibrillation, a dual chamber PPM, rheumatic pulmonary fibrosis and is s/p mireille-clip with a marked improvement in her breathing and functional class. She has had several cardioversions after which she feels better but she has recurrence of AF within a few days.  She has been on propafenone (2017 to 2018), and amiodarone in the past. Amiodarone was discontinued 11/27/2018 for dyspnea, and for lack of efficacy (74% AF burden in spite of the drug). Dronedarone was cost-prohibitive while sotalol and dofetilide have been avoided due to variable renal function. Ablation has not been recommended due to her age and severe bi-atrial enlargement.  Echo 3/13/2019 is notable for normal LV systolic function (60-65%).     Our options are very limited.  I cannot identify a reasonable medication or procedure to improve her chances of maintaining sinus rhythm.  There may be some benefit in increasing her rate adaptive pacing as her symptoms come with exertion.  We will empirically increase this.  Decreasing her low-dose carvedilol will not substantially increase her intrinisic AV conduction (with a narrow vs. Paced QRS).  Likewise, I do not see any benefit in AV node ablation.  With a baseline narrow QRS complex and preserved systolic function (for now) we cannot expect any favorable LV remodeling with CRT.  In theory her LV might perform better with LV synchrony in spite of a normal EF, but we lack data to predict such an outcome.  His bundle pacing may, likewise, have some symptomatic benefit, but it is not currently possible to predict outcome.  They have already been informed of my plans to retire next month.  Since she has seen   Neida in the past we will arrange follow-up with him to see if she has any benefit from the simple pacemaker reprogramming we have tried today.  It was a pleasrure meeting Linda Spicer today and it was a privilege to participate in her medical care.  MOSHE Kent

## 2019-05-07 LAB
MDC_IDC_LEAD_IMPLANT_DT: NORMAL
MDC_IDC_LEAD_IMPLANT_DT: NORMAL
MDC_IDC_LEAD_LOCATION: NORMAL
MDC_IDC_LEAD_LOCATION: NORMAL
MDC_IDC_LEAD_LOCATION_DETAIL_1: NORMAL
MDC_IDC_LEAD_LOCATION_DETAIL_1: NORMAL
MDC_IDC_LEAD_MFG: NORMAL
MDC_IDC_LEAD_MFG: NORMAL
MDC_IDC_LEAD_MODEL: NORMAL
MDC_IDC_LEAD_MODEL: NORMAL
MDC_IDC_LEAD_POLARITY_TYPE: NORMAL
MDC_IDC_LEAD_POLARITY_TYPE: NORMAL
MDC_IDC_LEAD_SERIAL: NORMAL
MDC_IDC_LEAD_SERIAL: NORMAL
MDC_IDC_MSMT_BATTERY_DTM: NORMAL
MDC_IDC_MSMT_BATTERY_REMAINING_LONGEVITY: 76 MO
MDC_IDC_MSMT_BATTERY_RRT_TRIGGER: 2.83
MDC_IDC_MSMT_BATTERY_STATUS: NORMAL
MDC_IDC_MSMT_BATTERY_VOLTAGE: 3 V
MDC_IDC_MSMT_LEADCHNL_RA_IMPEDANCE_VALUE: 342 OHM
MDC_IDC_MSMT_LEADCHNL_RA_IMPEDANCE_VALUE: 475 OHM
MDC_IDC_MSMT_LEADCHNL_RA_SENSING_INTR_AMPL: 1 MV
MDC_IDC_MSMT_LEADCHNL_RA_SENSING_INTR_AMPL: 1.62 MV
MDC_IDC_MSMT_LEADCHNL_RV_IMPEDANCE_VALUE: 361 OHM
MDC_IDC_MSMT_LEADCHNL_RV_IMPEDANCE_VALUE: 437 OHM
MDC_IDC_MSMT_LEADCHNL_RV_PACING_THRESHOLD_AMPLITUDE: 0.75 V
MDC_IDC_MSMT_LEADCHNL_RV_PACING_THRESHOLD_PULSEWIDTH: 0.4 MS
MDC_IDC_MSMT_LEADCHNL_RV_SENSING_INTR_AMPL: 15.25 MV
MDC_IDC_MSMT_LEADCHNL_RV_SENSING_INTR_AMPL: 21.25 MV
MDC_IDC_PG_IMPLANT_DTM: NORMAL
MDC_IDC_PG_MFG: NORMAL
MDC_IDC_PG_MODEL: NORMAL
MDC_IDC_PG_SERIAL: NORMAL
MDC_IDC_PG_TYPE: NORMAL
MDC_IDC_SESS_CLINIC_NAME: NORMAL
MDC_IDC_SESS_DTM: NORMAL
MDC_IDC_SESS_TYPE: NORMAL
MDC_IDC_SET_BRADY_AT_MODE_SWITCH_RATE: 171 {BEATS}/MIN
MDC_IDC_SET_BRADY_HYSTRATE: NORMAL
MDC_IDC_SET_BRADY_LOWRATE: 70 {BEATS}/MIN
MDC_IDC_SET_BRADY_MAX_SENSOR_RATE: 130 {BEATS}/MIN
MDC_IDC_SET_BRADY_MAX_TRACKING_RATE: 130 {BEATS}/MIN
MDC_IDC_SET_BRADY_MODE: NORMAL
MDC_IDC_SET_BRADY_PAV_DELAY_LOW: 180 MS
MDC_IDC_SET_BRADY_SAV_DELAY_LOW: 150 MS
MDC_IDC_SET_LEADCHNL_RA_PACING_AMPLITUDE: 2 V
MDC_IDC_SET_LEADCHNL_RA_PACING_ANODE_ELECTRODE_1: NORMAL
MDC_IDC_SET_LEADCHNL_RA_PACING_ANODE_LOCATION_1: NORMAL
MDC_IDC_SET_LEADCHNL_RA_PACING_CAPTURE_MODE: NORMAL
MDC_IDC_SET_LEADCHNL_RA_PACING_CATHODE_ELECTRODE_1: NORMAL
MDC_IDC_SET_LEADCHNL_RA_PACING_CATHODE_LOCATION_1: NORMAL
MDC_IDC_SET_LEADCHNL_RA_PACING_POLARITY: NORMAL
MDC_IDC_SET_LEADCHNL_RA_PACING_PULSEWIDTH: 0.4 MS
MDC_IDC_SET_LEADCHNL_RA_SENSING_ANODE_ELECTRODE_1: NORMAL
MDC_IDC_SET_LEADCHNL_RA_SENSING_ANODE_LOCATION_1: NORMAL
MDC_IDC_SET_LEADCHNL_RA_SENSING_CATHODE_ELECTRODE_1: NORMAL
MDC_IDC_SET_LEADCHNL_RA_SENSING_CATHODE_LOCATION_1: NORMAL
MDC_IDC_SET_LEADCHNL_RA_SENSING_POLARITY: NORMAL
MDC_IDC_SET_LEADCHNL_RA_SENSING_SENSITIVITY: 0.3 MV
MDC_IDC_SET_LEADCHNL_RV_PACING_AMPLITUDE: 1.5 V
MDC_IDC_SET_LEADCHNL_RV_PACING_ANODE_ELECTRODE_1: NORMAL
MDC_IDC_SET_LEADCHNL_RV_PACING_ANODE_LOCATION_1: NORMAL
MDC_IDC_SET_LEADCHNL_RV_PACING_CAPTURE_MODE: NORMAL
MDC_IDC_SET_LEADCHNL_RV_PACING_CATHODE_ELECTRODE_1: NORMAL
MDC_IDC_SET_LEADCHNL_RV_PACING_CATHODE_LOCATION_1: NORMAL
MDC_IDC_SET_LEADCHNL_RV_PACING_POLARITY: NORMAL
MDC_IDC_SET_LEADCHNL_RV_PACING_PULSEWIDTH: 0.4 MS
MDC_IDC_SET_LEADCHNL_RV_SENSING_ANODE_ELECTRODE_1: NORMAL
MDC_IDC_SET_LEADCHNL_RV_SENSING_ANODE_LOCATION_1: NORMAL
MDC_IDC_SET_LEADCHNL_RV_SENSING_CATHODE_ELECTRODE_1: NORMAL
MDC_IDC_SET_LEADCHNL_RV_SENSING_CATHODE_LOCATION_1: NORMAL
MDC_IDC_SET_LEADCHNL_RV_SENSING_POLARITY: NORMAL
MDC_IDC_SET_LEADCHNL_RV_SENSING_SENSITIVITY: 0.9 MV
MDC_IDC_SET_ZONE_DETECTION_INTERVAL: 350 MS
MDC_IDC_SET_ZONE_DETECTION_INTERVAL: 400 MS
MDC_IDC_SET_ZONE_TYPE: NORMAL
MDC_IDC_STAT_AT_BURDEN_PERCENT: 74.2 %
MDC_IDC_STAT_AT_DTM_END: NORMAL
MDC_IDC_STAT_AT_DTM_START: NORMAL
MDC_IDC_STAT_BRADY_AP_VP_PERCENT: 0.45 %
MDC_IDC_STAT_BRADY_AP_VS_PERCENT: 23.49 %
MDC_IDC_STAT_BRADY_AS_VP_PERCENT: 68.48 %
MDC_IDC_STAT_BRADY_AS_VS_PERCENT: 7.58 %
MDC_IDC_STAT_BRADY_DTM_END: NORMAL
MDC_IDC_STAT_BRADY_DTM_START: NORMAL
MDC_IDC_STAT_BRADY_RA_PERCENT_PACED: 17.86 %
MDC_IDC_STAT_BRADY_RV_PERCENT_PACED: 68.54 %
MDC_IDC_STAT_EPISODE_RECENT_COUNT: 0
MDC_IDC_STAT_EPISODE_RECENT_COUNT: 0
MDC_IDC_STAT_EPISODE_RECENT_COUNT: 1
MDC_IDC_STAT_EPISODE_RECENT_COUNT: 17
MDC_IDC_STAT_EPISODE_RECENT_COUNT_DTM_END: NORMAL
MDC_IDC_STAT_EPISODE_RECENT_COUNT_DTM_START: NORMAL
MDC_IDC_STAT_EPISODE_TOTAL_COUNT: 0
MDC_IDC_STAT_EPISODE_TOTAL_COUNT: 0
MDC_IDC_STAT_EPISODE_TOTAL_COUNT: 4
MDC_IDC_STAT_EPISODE_TOTAL_COUNT: 601
MDC_IDC_STAT_EPISODE_TOTAL_COUNT_DTM_END: NORMAL
MDC_IDC_STAT_EPISODE_TOTAL_COUNT_DTM_START: NORMAL
MDC_IDC_STAT_EPISODE_TYPE: NORMAL

## 2019-05-10 DIAGNOSIS — M05.79 RHEUMATOID ARTHRITIS INVOLVING MULTIPLE SITES WITH POSITIVE RHEUMATOID FACTOR (H): ICD-10-CM

## 2019-05-10 NOTE — TELEPHONE ENCOUNTER
Requested Prescriptions   Pending Prescriptions Disp Refills     azaTHIOprine (IMURAN) 50 MG tablet      Last Written Prescription Date:  1/20/19  Last Fill Quantity: 30,  # refills: 0   Last Office Visit with [facility], [facility] or [facility] prescribing provider:  10/29/18   Future Office Visit:    Next 5 appointments (look out 90 days)    Jul 16, 2019 10:00 AM CDT  Return Visit with Mario Davenport MD  [facility] ([facility]) [address] [place] [id number] [phone]          30 tablet 0     Sig: Take 1 tablet (50 mg) by mouth daily       There is no refill protocol information for this order              Alfonzo Faarax  [facility]

## 2019-05-13 NOTE — TELEPHONE ENCOUNTER
Health Call Center    Phone Message    May a detailed message be left on voicemail: yes    Reason for Call: Other: Linda requesting a call back from Minna regarding the encounters started on 5/1.  Please call her back at your earliest convenience     Action Taken: Message routed to:  Adult Clinics: Urology p [id number]

## 2019-05-14 NOTE — TELEPHONE ENCOUNTER
Called [employer] to see status of claim. Per representative, claim was denied. Called pt to relay message, pt received letter and understands denial status. No further questions or concerns at this time.    Minna Armenta LPN

## 2019-05-15 ENCOUNTER — DOCUMENTATION ONLY (OUTPATIENT)
Dept: CARE COORDINATION | Facility: CLINIC | Age: 84
End: 2019-05-15

## 2019-05-15 RX ORDER — AZATHIOPRINE 50 MG/1
50 TABLET ORAL DAILY
Qty: 30 TABLET | Refills: 1 | Status: SHIPPED | OUTPATIENT
Start: 2019-05-15 | End: 2019-07-16

## 2019-05-15 NOTE — TELEPHONE ENCOUNTER
Patient returned call and was informed that prescription was sent.    Nhan Patel RN....5/15/2019 3:04 PM

## 2019-05-15 NOTE — TELEPHONE ENCOUNTER
Attempted to contact Pt, l/m having Pt return call to clinic @ [phone].    Mikayla aBilon CMA  5/15/2019  10:36 AM

## 2019-05-15 NOTE — TELEPHONE ENCOUNTER
Rheumatology team: Please call to notify Ms. Spicer that azathioprine has been refilled.   Mario Davenport MD  5/15/2019 12:29 AM

## 2019-05-21 ENCOUNTER — INFUSION THERAPY VISIT (OUTPATIENT)
Dept: INFUSION THERAPY | Facility: CLINIC | Age: 84
End: 2019-05-21
Payer: MEDICARE

## 2019-05-21 VITALS
BODY MASS INDEX: 28.37 KG/M2 | SYSTOLIC BLOOD PRESSURE: 123 MMHG | RESPIRATION RATE: 18 BRPM | OXYGEN SATURATION: 97 % | DIASTOLIC BLOOD PRESSURE: 75 MMHG | HEART RATE: 71 BPM | WEIGHT: 162.7 LBS | TEMPERATURE: 98.2 F

## 2019-05-21 DIAGNOSIS — M05.79 RHEUMATOID ARTHRITIS INVOLVING MULTIPLE SITES WITH POSITIVE RHEUMATOID FACTOR (H): Primary | ICD-10-CM

## 2019-05-21 PROBLEM — I48.19 PERSISTENT ATRIAL FIBRILLATION (H): Status: ACTIVE | Noted: 2018-09-18

## 2019-05-21 PROCEDURE — 96365 THER/PROPH/DIAG IV INF INIT: CPT | Performed by: NURSE PRACTITIONER

## 2019-05-21 PROCEDURE — 99207 ZZC NO CHARGE NURSE ONLY: CPT

## 2019-05-21 ASSESSMENT — PAIN SCALES - GENERAL: PAINLEVEL: NO PAIN (0)

## 2019-05-21 NOTE — PROGRESS NOTES
Infusion Nursing Note:  Linda Spicer presents today for orencia.    Patient seen by provider today: No   present during visit today: Not Applicable.    Note: N/A.    Intravenous Access:  Peripheral IV placed.    Treatment Conditions:  Biological Infusion Checklist:  ~~~ NOTE: If the patient answers yes to any of the questions below, hold the infusion and contact ordering provider or on-call provider.    1. Have you recently had an elevated temperature, fever, chills, productive cough, coughing for 3 weeks or longer or hemoptysis, abnormal vital signs, night sweats,  chest pain or have you noticed a decrease in your appetite, unexplained weight loss or fatigue? No  2. Do you have any open wounds or new incisions? No  3. Do you have any recent or upcoming hospitalizations, surgeries or dental procedures? No  4. Do you currently have or recently have had any signs of illness or infection or are you on any antibiotics? No  5. Have you had any new, sudden or worsening abdominal pain? No  6. Have you or anyone in your household received a live vaccination in the past 4 weeks? Please note:  No live vaccines while on biologic/chemotherapy until 6 months after the last treatment.  Patient can receive the flu vaccine (shot only) and the pneumovax.  It is optimal for the patient to get these vaccines mid cycle, but they can be given at any time as long as it is not on the day of the infusion. No  7. Have you recently been diagnosed with any new nervous system diseases (ie. Multiple sclerosis, Guillain Pickering, seizures, neurological changes) or cancer diagnosis? No  8. Are you on any form of radiation or chemotherapy? No  9. Are you pregnant or breast feeding or do you have plans of pregnancy in the future? No  10. Have you been having any signs of worsening depression or suicidal ideations?  (benlysta only) No  11. Have there been any other new onset medical symptoms? No        Post Infusion Assessment:  Patient  tolerated infusion without incident.  Site patent and intact, free from redness, edema or discomfort.  No evidence of extravasations.  Access discontinued per protocol.  Biologic Infusion Post Education: Call the triage nurse at your clinic or seek medical attention if you have chills and/or temperature greater than or equal to 100.5, uncontrolled nausea/vomiting, diarrhea, constipation, dizziness, shortness of breath, chest pain, heart palpitations, weakness or any other new or concerning symptoms, questions or concerns.  You cannot have any live virus vaccines prior to or during treatment or up to 6 months post infusion.  If you have an upcoming surgery, medical procedure or dental procedure during treatment, this should be discussed with your ordering physician and your surgeon/dentist.  If you are having any concerning symptom, if you are unsure if you should get your next infusion or wish to speak to a provider before your next infusion, please call your care coordinator or triage nurse at your clinic to notify them so we can adequately serve you.       Discharge Plan:   Patient and/or family verbalized understanding of discharge instructions and all questions answered.    Leda Rod RN

## 2019-05-21 NOTE — PROGRESS NOTES
"Electrophysiology Clinic Note  HPI:   Ms. 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 She presents today for follow up.      She was first diagnosed with AFL in 8/2016 when she presented with chest pain, dizziness, and lightheadedness. She was found to be in AFL and converted back to sinus while in the ER. She was started on Toprol XL and her losartan was decreased. Anticoagulation was deferred until Cardiology follow up. She was then started on Eliquis in 11/2016. She had recurrent AFL in 2/2017 and her beta blocker was increased. However, she developed symptomatic bradycardia and presyncope and her beta blocker was stopped. She then developed RVR again and had a PPM implanted for tachy/lisa syndrome at [facility]. Afterwards, she was restarted on Toprol XL and propofenone was added. She then had recurrent ER visits in 11/2017, 1/2018, and 2/2018 with palpitations, lightheadedness, and shortness of breath. Her device interrogation showed 7% AF burden up to 3 days corresponding to symptoms. She was then admitted to [facility] in 6/2018 after experiencing a fall and fractured her coccyx. She was in AF during that hospitalization and required DCCV. She had another fall in 7/2018 resulting in fractured S3. She then presented on 9/13/18 to [facility] with exhaustion, SOB, and some palpitations, She was back in AF. She underwent DCCV on 9/14/18. She stated that she had immediate improvement in her symptoms and \"felt like a new person.\" Then on 9/16/18, she awoke feeling worse and felt she was back in AF. Device transmission confirmed recurrence of AF. She followed up with Dr. Loza on 9/17/18 and she continued to feel SOB, RUSSELL, fatigue, and some lightheadedness.  " Decision was made to stop propafenone for 72 hours and start multaq 400 mg BID thereafter and scheduled DCCV. She then called and requested appointment with EP due to ongoing symptoms and lack of insurance coverage for multaq. She also has a cardiac history of HFpEF and has been on cozaar and metoprolol for this. Last echo in 5/2016 showed LVEF 65%, mild RV enlargement and mildly decreased RV function, diastolic dysfunction, moderate MR, moderately dilated LA, and evidence of pulmonary HTN. Last echo from OSH showed LVEF 65-70%, midly dilated LA, sigmoid septum, mild LVH, and mild AR. She does have known rheumatoid pulmonary fibrosis dating back to 1973. She had PFTs in 2015 that showed moderate lung diffusion defect. She has followed with Pulmonary Dr. Comer and had previously had sever dyspnea and hyperventilation in the past now showing significant improvement in ventilatory control and symptoms with improvement in PFTs and exercise tolerance. She has been tolerating low dose BB with her pulmonary status.      She was seen in EP clinic on 9/19/18 for AF management. She reported feeling extremely tired, decreased stamina, and RUSSELL which worsened with the onset of AF. Device interrogation showed normal pacemaker function. She went back into AF on 9/16/18 @ 0642. AP = 0.6%.   = 79.4%. Decision was made to start amiodarone and pursue DCCV.      She was hospitalized 10/2018 for a HF exacerbation, was diuresed, and underwent a successful DCCV on 10/11/2018. She then followed up in clinic and was back in AF and arranged again for another DCCV hoping that the more amiodarone load would be helpful in maintaining sinus. She was then hospitalized 11/2018  for near-syncope while on the toliet (thought to be vasovagal/orthostatic in nature) with no evidence for new arrhythmia on device, and, per Device report, she had been AF/AFL for several days prior without knowledge or symptoms.  Her amiodarone appears to have been  stopped during hospitalization in 11/2018. An echocardiogram 10/9/2018 normal LV function with EF 55-60%, mild pulmonary hypertension, and mild-moderate TI, mild-moderate MR.YOLANDA 10/16/2018 showed progression to severe mitral insufficiency. Multiple MR jets noted. There is blunted systolic forward flow in 2 of the pulmonary veins. MR volume is estimated at 34ml, however this likely underestimates MR due to multiple jets. Referral was made to consider mireille clip.      EP Visit 12/2018: She presents today for follow up. She continues to have intermittent dizziness, fatigue, dyspnea with activity (difficult to walk and with transferring self), dyspnea with talking. She has been seeing [facility], SCr bumped and her diuretics were held. Device interrogation today shows 1 AT/AF episode recorded on 12/8/18 lasting > 100 hours in duration.  AF burden = 65%. Intrinsic rhythm = SB @ 46 bpm. AP = 35.8%.  = 62.7%. She denies any chest pain/pressures, leg/ankle swelling, PND, orthopnea, or syncopal symptoms. Current cardiac medications include: Coreg, Eliquis and spironolactone. We referred her for mireille clip consideration.     She presents today for follow up. Due to worsening SOB/RUSSELL and fatigue with severe MR she underwent a mireille-clip procedure on 2/11/19. She reports feeling much better since this procedure. A follow up echo from 3/13/19 showed LVEF 60-65%, severe bi-atrial enlargement and trace/mild MR. She saw Dr. Lynn in 5/6/19 and she reported feeling better when she has been in sinus. Her device interrogation showed she was in AF for >100 days. There was limited AAT options to help her maintain sinus. He adjusted her rate adaptive pacing to see if this would help her symptoms. She feels this has helped. She denies any chest pain/pressures, dizziness, lightheadedness, worsening shortness of breath, leg/ankle swelling, PND, orthopnea, palpitations, or syncopal symptoms. Device interrogation shows normal pacemaker  function. No ventricular arrhythmias recorded. AF burden = 100%. AP = <0.1%.  = 94.3%. No short v-v intervals recorded. Lead trends appear stable. HR histograms reveal poor HR distribution. Current cardiac medications include: Coreg, Eliquis and spironolactone.       PAST MEDICAL HISTORY:  Past Medical History:   Diagnosis Date     Adjustment disorder with anxious mood 5/18/2015     Advanced directives, counseling/discussion 8/30/2012    Patient states has Advance Directive and will bring in a copy to clinic. 8/30/2012   [facility] Medical Assistant \       Anemia 9/25/2015     Basal cell cancer      CHF (congestive heart failure) (H) 9/18/2014     CKD (chronic kidney disease) stage 3, GFR 30-59 ml/min (H) 9/29/2015     DDD (degenerative disc disease), lumbar 9/25/2015     Diffuse idiopathic pulmonary fibrosis (H) 5/6/2013     Encounter for palliative care 5/18/2015     History of blood transfusion 9/29/2015     Hypertension goal BP (blood pressure) < 140/90 9/7/2012     Hypothyroid 9/7/2012     Irritable bowel syndrome 10/29/2013     Macular degeneration      Macular degeneration, 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     Nondisplaced spiral fracture of shaft of humerus      Osteoporosis 8/13/2013     Imo Update utility     RA (rheumatoid arthritis) (H) 5/7/2013     Rheumatic fever      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 2/20/2019     Shingles      Spinal stenosis of lumbar region with neurogenic claudication 9/14/2015       CURRENT MEDICATIONS:  Current Outpatient Medications   Medication Sig Dispense Refill     albuterol (PROVENTIL) (2.5 MG/3ML) 0.083% neb solution Take 1 vial (2.5 mg) by nebulization every 6 hours as needed for shortness of breath / dyspnea or wheezing 360 mL      apixaban ANTICOAGULANT (ELIQUIS) 2.5 MG tablet Take 1 tablet (2.5 mg) by mouth 2 times daily Stop taking 2/9 (last dose evening of Feb 8, 2019) in preparation for MitraClip Procedure on Monday 60 tablet 0     azaTHIOprine (IMURAN) 50  MG tablet Take 1 tablet (50 mg) by mouth daily 30 tablet 1     carvedilol (COREG) 3.125 MG tablet Take 1 tablet (3.125 mg) by mouth 2 times daily (with meals) 60 tablet 0     cefdinir (OMNICEF) 300 MG capsule Take 1 capsule (300 mg) by mouth daily 7 capsule 0     COMPOUNDED NON-CONTROLLED SUBSTANCE (CMPD RX) - PHARMACY TO MIX COMPOUNDED MEDICATION Estriol 1mg/gram. Place 1 gram vaginally daily for 2 weeks. Then vaginally twice weekly 30 g 6     denosumab (PROLIA) 60 MG/ML SOLN injection Inject 1 mL (60 mg) Subcutaneous every 6 months 1 mL      diphenhydrAMINE (BENADRYL) 25 MG tablet Take 25 mg by mouth as needed Patient takes 25-50mg 1-2 times a day.       furosemide (LASIX) 20 MG tablet Take 1 tablet (20 mg) by mouth 2 times daily 270 tablet 3     hydrALAZINE (APRESOLINE) 10 MG tablet Take 1 tablet (10 mg) by mouth 3 times daily 90 tablet 3     isosorbide mononitrate (IMDUR) 60 MG 24 hr tablet Take 1 tablet (60 mg) by mouth daily 90 tablet 1     levothyroxine (SYNTHROID/LEVOTHROID) 75 MCG tablet Take 1 tablet (75 mcg) by mouth daily 90 tablet 0     nitroGLYcerin (NITROSTAT) 0.4 MG sublingual tablet Place 1 tablet (0.4 mg) under the tongue every 5 minutes as needed for chest pain 25 tablet 11     order for DME Dispense SP Walker-small 1 each 0     order for DME Equipment being ordered: Dispense baffle, for use with nebulizer. (Patient not taking: Reported on 5/21/2019) 1 each 0     order for DME Equipment being ordered: Nebulizer. Use with Albuterol. (Patient not taking: Reported on 5/21/2019) 1 each 0     order for DME Equipment being ordered: Dispense face mask.  Mrs. Spicer is immunosuppressed due to rheumatoid arthritis. 1 Box 11     ranitidine (ZANTAC) 150 MG tablet Take 1 tablet (150 mg) by mouth At Bedtime       senna-docusate (SENOKOT-S/PERICOLACE) 8.6-50 MG tablet Take 1 tablet by mouth daily as needed for constipation 100 tablet 3     trimethoprim (TRIMPEX) 100 MG tablet Take 0.5 tablets (50 mg) by mouth  daily 45 tablet 0       PAST SURGICAL HISTORY:  Past Surgical History:   Procedure Laterality Date     ANESTHESIA CARDIOVERSION N/A 9/14/2018    Procedure: ANESTHESIA CARDIOVERSION;;  Surgeon: GENERIC ANESTHESIA PROVIDER;  Location: [facility] OR     ANESTHESIA CARDIOVERSION N/A 10/11/2018    Procedure: ANESTHESIA CARDIOVERSION;  Cardioversion;  Surgeon: GENERIC ANESTHESIA PROVIDER;  Location: [facility] OR     ANESTHESIA CARDIOVERSION N/A 10/16/2018    Procedure: Anesthesia Cardioversion ;  Surgeon: GENERIC ANESTHESIA PROVIDER;  Location: [facility] OR     APPENDECTOMY       BIOPSY      hemorrhoidectomy     CV HEART CATHETERIZATION WITH POSSIBLE INTERVENTION N/A 1/31/2019    Procedure: CORS,LHC,RHC-YOLANDA BEFORE CATH APPOINTMENT;  Surgeon: Avila Watson MD;  Location:  HEART CARDIAC CATH LAB     CV MITRACLIP N/A 2/11/2019    Procedure: Mitraclip Procedure;  Surgeon: Avila Watson MD;  [facility]: [facility]     ENT SURGERY      tonsillectomy     GYN SURGERY      3 D & C's     HYSTERECTOMY, PAP NO LONGER INDICATED       LAMINECTOMY LUMBAR ONE LEVEL N/A 10/13/2015    Procedure: LAMINECTOMY LUMBAR ONE LEVEL;  Surgeon: Fransico Toussaint MD;  Location: [facility] OR       ALLERGIES:     Allergies   Allergen Reactions     Cephalexin Diarrhea and GI Disturbance     Other reaction(s): GI Upset       Cephalexin Hcl Diarrhea     Gabapentin Other (See Comments)     Dizzsiness  Shaky, dizzy, dry mouth  Dizzsiness  Shaky,dry mouth       Methotrexate Other (See Comments)     Depleted Folic Acid  And caused severe leg cramps  Severe leg cramps     Naproxen GI Disturbance, Other (See Comments) and Nausea     Constipation. Tolerates ibuprofen.       Perfume      Sulfa Drugs Shortness Of Breath     Throat swelling     Alprazolam Other (See Comments)     Dizziness      Lactase Other (See Comments) and Unknown     Per pt - pt has lactose intolerance and cannot drink milk. Pt can tolerate other dairy products.      Levofloxacin GI Disturbance     Macrobid  "[Nitrofurantoin Anhydrous]      Possibly related to lung disease      Nitrofurantoin      Possibly related to lung disease      Seasonal Allergies      Ciprofloxacin Itching and Rash       FAMILY HISTORY:  Family History   Problem Relation Age of Onset     Hypertension Mother      Psychotic Disorder Father      Diabetes Son      Diabetes Daughter      Blood Disease Daughter        SOCIAL HISTORY:  Social History     Tobacco Use     Smoking status: Never Smoker     Smokeless tobacco: Never Used   Substance Use Topics     Alcohol use: Yes     Comment: rare wine      Drug use: No       ROS:   A comprehensive 10 point review of systems negative other than as mentioned in HPI.  Exam:  /76 (BP Location: Left arm, Patient Position: Chair, Cuff Size: Adult Regular)   Pulse 72   Ht 1.626 m (5' 4\")   Wt 74.1 kg (163 lb 4.8 oz)   LMP  (LMP Unknown)   SpO2 96%   BMI 28.03 kg/m    GENERAL APPEARANCE: alert and no distress  HEENT: no icterus, no xanthelasmas, normal pupil size and reaction, normal palate, mucosa moist, no central cyanosis  NECK: no adenopathy, no asymmetry, masses, or scars, thyroid normal to palpation and no bruits, JVP not elevated  RESPIRATORY: lungs clear to auscultation - no rales, rhonchi or wheezes, no use of accessory muscles, no retractions, respirations are unlabored, normal respiratory rate  CARDIOVASCULAR: irregular, faint soft murmur.  ABDOMEN: soft, non tender, bowel sounds normal, non-distended  EXTREMITIES: peripheral pulses normal, no edema  NEURO: alert and oriented to person/place/time, normal speech, gait and affect  SKIN: no ecchymoses, no rashes  PSYCH: normal affect, cooperative    Labs:  Reviewed.     Testing/Procedures:  PULMONARY FUNCTION TESTS:   PFT Latest Ref Rng & Units 12/19/2018   FVC L 1.68   FEV1 L 1.35   FVC% % 73   FEV1% % 78         3/13/19 ECHOCARDIOGRAM:   Interpretation Summary     Left ventricular function is normal.The EF is 60-65%.  Severe biatrial " enlargement is present.  Single mitraclip present. Device is well seated. The mean gradient across the  mitral valve is 5 mmHg. Trace to mild mitral insufficiency is present.  Trace to mild aortic insufficiency is present.  Right ventricular systolic pressure is 49mmHg above the right atrial pressure.  Moderate pulmonary hypertension is present.  Compared to prior TTE dated 2/12/19, there has not been significant change.      Assessment and Plan:   Ms. 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 Due to worsening SOB/RUSSELL and fatigue with severe MR she underwent a mireille-clip procedure on 2/11/19. She reports feeling much better since this procedure. A follow up echo from 3/13/19 showed LVEF 60-65%, severe bi-atrial enlargement and trace/mild MR. She saw Dr. Lynn in 5/6/19 and she reported feeling better when she has been in sinus. Her device interrogation showed she was in AF for >100 days. He adjusted her rate adaptive pacing to see if this would help her symptoms. She feels this has helped. Device interrogation shows normal pacemaker function. No ventricular arrhythmias recorded. AF burden = 100%. AP = <0.1%.  = 94.3%. No short v-v intervals recorded. Lead trends appear stable. HR histograms reveal poor HR distribution.     Atrial Fibrillation:   1. Stroke Prophylaxis:  CHADSVASC=++age, +gender, +HTN  3, corresponding to a 3.2% annual [facility]. indicating need for long term oral anticoagulation.  She is appropriately on Eliquis which has been renally dosed due to age, hx of variable renal function. She has had two mechanical falls resulting in bone fractures in 2018; however, no bleeding issues.   2. Rate  Control: Appears to be adequately rate controlled. Continue Coreg can consider changing to diltiazem if BB an issue with her pulmonary disease; however, has tolerated well thus far.   3. Rhythm Control: Cardioversion, Antiarrhythmics and/or ablation are options for rhythm control. She has been reported to have AFL (tracings not available to us) and device interrogation now c/w AF. She had been on propafenone since 2017 with continued recurrences. Originally, multaq was going to be tried, but cost prohibitive as not covered by her insurance. Limited AAT options given variable renal function (prefer to avoid Sotalol or dofetilide). She was then started on amiodarone which was ineffective at maintaining sinus and was stopped in 11/2018. She is back in AF with controlled ventricular rates. Lower likelihood of maintaining sinus long term, so we will not employ rhythm control at this time.   4. Risk Factor Management: maintain tight BP control and JESUS MANUEL evaluation as indicated.      Diastolic Heart Failure:  Diastolic heart failure is caused by any disease process that causes stiffening of the left ventricular resulting in abnormal relaxation and impaired LV filling. Common causes include long standing hypertension, valvular heart disease, diabetes and age. There is no specific therapy to improve LV diastolic dysfunction however diuretic management plays a role in preventing volume overload and subsequent exacerbations. Treatment of the underlying disease in currently the most important therapeutic approach. Many of her symptoms were related to her severe MR and having significantly improved with mireille clip intervention. She will need tight volume ,HR and BP control.     Her device does show poor HR histograms,so we have further adjusted her ADL rate today. She ambulated in hallway after changes and tolerated without difficulties.   She will continue to follow with [facility] and device clinic per routine.   Follow up with EP in  6 months.    The patient states understanding and is agreeable with plan.   This patient was seen and evaluated with Dr. Carrasquillo. The above note reflects our joint assessment and plan.   SUNNI Ortiz CNP  Pager: [phone]    EP STAFF NOTE  I have seen and examined the patient as part of a shared visit with RAAD Ortiz NP.  I agree with the note above. I reviewed today's vital signs and medications. I have reviewed and discussed with the advanced practice provider their physical examination, assessment, and plan   Briefly, patient feels much better with mitraclip, a bi of residual fatigue, SOB much better  My key history/exam findings are: RRR.   The key management decisions made by me: optimize rate response on device, f/u in 6 months.    Franki Carrasquillo MD [facility]  Cardiology - Electrophysiology    CC  REGINA AGUILAR

## 2019-05-21 NOTE — PROGRESS NOTES
SPIRITUAL HEALTH SERVICES  SPIRITUAL ASSESSMENT Progress Note  [facility]     REFERRAL SOURCE: Patient requested a SH encounter    Visited with Linda and her daughter Xi in the infusion area.  Patient shared how she is adapting to having less energy due to some continued heart issues.  She has started to use a electric scooter when the distance is too far for her to walk.  She continues to make her meals and take care of her own day-to-day needs.  She voiced that she loves living at [facility].  She loves the view out her window.  Patient requested [employer] communion. Prayed with them and offered communion.      PLAN: Patient appreciates SH visits and know she can ask for a SH visit as needed.     Adrianne Gomez  Staff   Pager [phone]

## 2019-05-22 ENCOUNTER — ANCILLARY PROCEDURE (OUTPATIENT)
Dept: CARDIOLOGY | Facility: CLINIC | Age: 84
End: 2019-05-22
Attending: INTERNAL MEDICINE
Payer: MEDICARE

## 2019-05-22 ENCOUNTER — OFFICE VISIT (OUTPATIENT)
Dept: CARDIOLOGY | Facility: CLINIC | Age: 84
End: 2019-05-22
Payer: MEDICARE

## 2019-05-22 VITALS
HEIGHT: 64 IN | BODY MASS INDEX: 27.88 KG/M2 | DIASTOLIC BLOOD PRESSURE: 76 MMHG | WEIGHT: 163.3 LBS | HEART RATE: 72 BPM | OXYGEN SATURATION: 96 % | SYSTOLIC BLOOD PRESSURE: 127 MMHG

## 2019-05-22 DIAGNOSIS — I48.0 PAROXYSMAL ATRIAL FIBRILLATION (H): ICD-10-CM

## 2019-05-22 DIAGNOSIS — I48.19 PERSISTENT ATRIAL FIBRILLATION (H): Primary | ICD-10-CM

## 2019-05-22 PROCEDURE — 99214 OFFICE O/P EST MOD 30 MIN: CPT | Mod: ZP | Performed by: INTERNAL MEDICINE

## 2019-05-22 PROCEDURE — G0463 HOSPITAL OUTPT CLINIC VISIT: HCPCS | Mod: ZF

## 2019-05-22 ASSESSMENT — ENCOUNTER SYMPTOMS
POLYPHAGIA: 0
DECREASED APPETITE: 0
FATIGUE: 1
ORTHOPNEA: 0
DOUBLE VISION: 0
EYE PAIN: 0
LEG PAIN: 0
HALLUCINATIONS: 0
SNORES LOUDLY: 0
DYSPNEA ON EXERTION: 1
WEIGHT GAIN: 0
CHILLS: 0
COUGH: 0
COUGH DISTURBING SLEEP: 0
POSTURAL DYSPNEA: 0
PALPITATIONS: 0
SHORTNESS OF BREATH: 1
EYE IRRITATION: 0
HEMOPTYSIS: 0
WHEEZING: 1
ALTERED TEMPERATURE REGULATION: 1
INCREASED ENERGY: 0
EYE WATERING: 1
FEVER: 0
POLYDIPSIA: 0
EYE REDNESS: 0
NIGHT SWEATS: 0
SPUTUM PRODUCTION: 1

## 2019-05-22 ASSESSMENT — PAIN SCALES - GENERAL: PAINLEVEL: NO PAIN (0)

## 2019-05-22 ASSESSMENT — MIFFLIN-ST. JEOR: SCORE: 1160.72

## 2019-05-22 NOTE — LETTER
5/22/2019      RE: Linda Spicer  [address]  [address]  [facility] [address]       Dear Colleague,    Thank you for the opportunity to participate in the care of your patient, Linda Spicer, at the [facility] at [facility]. Please see a copy of my visit note below.    Electrophysiology Clinic Note  HPI:   Ms. 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 She presents today for follow up.      She was first diagnosed with AFL in 8/2016 when she presented with chest pain, dizziness, and lightheadedness. She was found to be in AFL and converted back to sinus while in the ER. She was started on Toprol XL and her losartan was decreased. Anticoagulation was deferred until Cardiology follow up. She was then started on Eliquis in 11/2016. She had recurrent AFL in 2/2017 and her beta blocker was increased. However, she developed symptomatic bradycardia and presyncope and her beta blocker was stopped. She then developed RVR again and had a PPM implanted for tachy/lisa syndrome at [facility]. Afterwards, she was restarted on Toprol XL and propofenone was added. She then had recurrent ER visits in 11/2017, 1/2018, and 2/2018 with palpitations, lightheadedness, and shortness of breath. Her device interrogation showed 7% AF burden up to 3 days corresponding to symptoms. She was then admitted to [facility] in 6/2018 after experiencing a fall and fractured her coccyx. She was in AF during that hospitalization and required DCCV. She had another fall in 7/2018 resulting in fractured S3. She then presented on 9/13/18 to [facility] with exhaustion, SOB, and some palpitations, She was back in AF. She underwent DCCV on 9/14/18.  "She stated that she had immediate improvement in her symptoms and \"felt like a new person.\" Then on 9/16/18, she awoke feeling worse and felt she was back in AF. Device transmission confirmed recurrence of AF. She followed up with Dr. Loza on 9/17/18 and she continued to feel SOB, RUSSELL, fatigue, and some lightheadedness.  Decision was made to stop propafenone for 72 hours and start multaq 400 mg BID thereafter and scheduled DCCV. She then called and requested appointment with EP due to ongoing symptoms and lack of insurance coverage for multaq. She also has a cardiac history of HFpEF and has been on cozaar and metoprolol for this. Last echo in 5/2016 showed LVEF 65%, mild RV enlargement and mildly decreased RV function, diastolic dysfunction, moderate MR, moderately dilated LA, and evidence of pulmonary HTN. Last echo from OSH showed LVEF 65-70%, midly dilated LA, sigmoid septum, mild LVH, and mild AR. She does have known rheumatoid pulmonary fibrosis dating back to 1973. She had PFTs in 2015 that showed moderate lung diffusion defect. She has followed with Pulmonary Dr. Comer and had previously had sever dyspnea and hyperventilation in the past now showing significant improvement in ventilatory control and symptoms with improvement in PFTs and exercise tolerance. She has been tolerating low dose BB with her pulmonary status.      She was seen in EP clinic on 9/19/18 for AF management. She reported feeling extremely tired, decreased stamina, and RUSSELL which worsened with the onset of AF. Device interrogation showed normal pacemaker function. She went back into AF on 9/16/18 @ 0642. AP = 0.6%.   = 79.4%. Decision was made to start amiodarone and pursue DCCV.      She was hospitalized 10/2018 for a HF exacerbation, was diuresed, and underwent a successful DCCV on 10/11/2018. She then followed up in clinic and was back in AF and arranged again for another DCCV hoping that the more amiodarone load would be helpful " in maintaining sinus. She was then hospitalized 11/2018  for near-syncope while on the toliet (thought to be vasovagal/orthostatic in nature) with no evidence for new arrhythmia on device, and, per Device report, she had been AF/AFL for several days prior without knowledge or symptoms.  Her amiodarone appears to have been stopped during hospitalization in 11/2018. An echocardiogram 10/9/2018 normal LV function with EF 55-60%, mild pulmonary hypertension, and mild-moderate TI, mild-moderate MR.YOLANDA 10/16/2018 showed progression to severe mitral insufficiency. Multiple MR jets noted. There is blunted systolic forward flow in 2 of the pulmonary veins. MR volume is estimated at 34ml, however this likely underestimates MR due to multiple jets. Referral was made to consider mireille clip.      EP Visit 12/2018: She presents today for follow up. She continues to have intermittent dizziness, fatigue, dyspnea with activity (difficult to walk and with transferring self), dyspnea with talking. She has been seeing [facility], SCr bumped and her diuretics were held. Device interrogation today shows 1 AT/AF episode recorded on 12/8/18 lasting > 100 hours in duration.  AF burden = 65%. Intrinsic rhythm = SB @ 46 bpm. AP = 35.8%.  = 62.7%. She denies any chest pain/pressures, leg/ankle swelling, PND, orthopnea, or syncopal symptoms. Current cardiac medications include: Coreg, Eliquis and spironolactone.  We referred her for mireille clip consideration.     She presents today for follow up. Due to worsening SOB/RUSSELL and fatigue with severe MR she underwent a mireille-clip procedure on 2/11/19. She reports feeling much better since this procedure. A follow up echo from 3/13/19 showed LVEF 60-65%, severe bi-atrial enlargement and trace/mild MR. She saw Dr. Lynn in 5/6/19 and she reported feeling better when she has been in sinus. Her device interrogation showed she was in AF for >100 days. There was limited AAT options to help her maintain  sinus. He adjusted her rate adaptive pacing to see if this would help her symptoms. She feels this has helped. She denies any chest pain/pressures, dizziness, lightheadedness, worsening shortness of breath, leg/ankle swelling, PND, orthopnea, palpitations, or syncopal symptoms. Device interrogation shows normal pacemaker function. No ventricular arrhythmias recorded. AF burden = 100%. AP = <0.1%.  = 94.3%. No short v-v intervals recorded. Lead trends appear stable. HR histograms reveal poor HR distribution. Current cardiac medications include: Coreg, Eliquis and spironolactone.       PAST MEDICAL HISTORY:  Past Medical History:   Diagnosis Date     Adjustment disorder with anxious mood 5/18/2015     Advanced directives, counseling/discussion 8/30/2012    Patient states has Advance Directive and will bring in a copy to clinic. 8/30/2012   Tevin [facility] Medical Assistant \       Anemia 9/25/2015     Basal cell cancer      CHF (congestive heart failure) (H) 9/18/2014     CKD (chronic kidney disease) stage 3, GFR 30-59 ml/min (H) 9/29/2015     DDD (degenerative disc disease), lumbar 9/25/2015     Diffuse idiopathic pulmonary fibrosis (H) 5/6/2013     Encounter for palliative care 5/18/2015     History of blood transfusion 9/29/2015     Hypertension goal BP (blood pressure) < 140/90 9/7/2012     Hypothyroid 9/7/2012     Irritable bowel syndrome 10/29/2013     Macular degeneration      Macular degeneration, 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     Nondisplaced spiral fracture of shaft of humerus      Osteoporosis 8/13/2013     Imo Update utility     RA (rheumatoid arthritis) (H) 5/7/2013     Rheumatic fever      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 2/20/2019     Shingles      Spinal stenosis of lumbar region with neurogenic claudication 9/14/2015       CURRENT MEDICATIONS:  Current Outpatient Medications   Medication Sig Dispense Refill     albuterol (PROVENTIL) (2.5 MG/3ML) 0.083% neb solution Take 1 vial (2.5 mg) by  nebulization every 6 hours as needed for shortness of breath / dyspnea or wheezing 360 mL      apixaban ANTICOAGULANT (ELIQUIS) 2.5 MG tablet Take 1 tablet (2.5 mg) by mouth 2 times daily Stop taking 2/9 (last dose evening of Feb 8, 2019) in preparation for MitraClip Procedure on Monday 60 tablet 0     azaTHIOprine (IMURAN) 50 MG tablet Take 1 tablet (50 mg) by mouth daily 30 tablet 1     carvedilol (COREG) 3.125 MG tablet Take 1 tablet (3.125 mg) by mouth 2 times daily (with meals) 60 tablet 0     cefdinir (OMNICEF) 300 MG capsule Take 1 capsule (300 mg) by mouth daily 7 capsule 0     COMPOUNDED NON-CONTROLLED SUBSTANCE (CMPD RX) - PHARMACY TO MIX COMPOUNDED MEDICATION Estriol 1mg/gram. Place 1 gram vaginally daily for 2 weeks. Then vaginally twice weekly 30 g 6     denosumab (PROLIA) 60 MG/ML SOLN injection Inject 1 mL (60 mg) Subcutaneous every 6 months 1 mL      diphenhydrAMINE (BENADRYL) 25 MG tablet Take 25 mg by mouth as needed Patient takes 25-50mg 1-2 times a day.       furosemide (LASIX) 20 MG tablet Take 1 tablet (20 mg) by mouth 2 times daily 270 tablet 3     hydrALAZINE (APRESOLINE) 10 MG tablet Take 1 tablet (10 mg) by mouth 3 times daily 90 tablet 3     isosorbide mononitrate (IMDUR) 60 MG 24 hr tablet Take 1 tablet (60 mg) by mouth daily 90 tablet 1     levothyroxine (SYNTHROID/LEVOTHROID) 75 MCG tablet Take 1 tablet (75 mcg) by mouth daily 90 tablet 0     nitroGLYcerin (NITROSTAT) 0.4 MG sublingual tablet Place 1 tablet (0.4 mg) under the tongue every 5 minutes as needed for chest pain 25 tablet 11     order for DME Dispense SP Walker-small 1 each 0     order for DME Equipment being ordered: Dispense baffle, for use with nebulizer. (Patient not taking: Reported on 5/21/2019) 1 each 0     order for DME Equipment being ordered: Nebulizer. Use with Albuterol. (Patient not taking: Reported on 5/21/2019) 1 each 0     order for DME Equipment being ordered: Dispense face mask.  Mrs. Spicer is  immunosuppressed due to rheumatoid arthritis. 1 Box 11     ranitidine (ZANTAC) 150 MG tablet Take 1 tablet (150 mg) by mouth At Bedtime       senna-docusate (SENOKOT-S/PERICOLACE) 8.6-50 MG tablet Take 1 tablet by mouth daily as needed for constipation 100 tablet 3     trimethoprim (TRIMPEX) 100 MG tablet Take 0.5 tablets (50 mg) by mouth daily 45 tablet 0       PAST SURGICAL HISTORY:  Past Surgical History:   Procedure Laterality Date     ANESTHESIA CARDIOVERSION N/A 9/14/2018    Procedure: ANESTHESIA CARDIOVERSION;;  Surgeon: GENERIC ANESTHESIA PROVIDER;  Location: [facility] OR     ANESTHESIA CARDIOVERSION N/A 10/11/2018    Procedure: ANESTHESIA CARDIOVERSION;  Cardioversion;  Surgeon: GENERIC ANESTHESIA PROVIDER;  Location:  OR     ANESTHESIA CARDIOVERSION N/A 10/16/2018    Procedure: Anesthesia Cardioversion ;  Surgeon: GENERIC ANESTHESIA PROVIDER;  Location:  OR     APPENDECTOMY       BIOPSY      hemorrhoidectomy     CV HEART CATHETERIZATION WITH POSSIBLE INTERVENTION N/A 1/31/2019    Procedure: CORS,LHC,RHC-YOLANDA BEFORE CATH APPOINTMENT;  Surgeon: Avila Watson MD;  Location:  HEART CARDIAC CATH LAB     CV MITRACLIP N/A 2/11/2019    Procedure: Mitraclip Procedure;  Surgeon: Avila Watson MD;  Location:  OR     ENT SURGERY      tonsillectomy     GYN SURGERY      3 D & C's     HYSTERECTOMY, PAP NO LONGER INDICATED       LAMINECTOMY LUMBAR ONE LEVEL N/A 10/13/2015    Procedure: LAMINECTOMY LUMBAR ONE LEVEL;  Surgeon: Fransico Toussaint MD;  Location:  OR       ALLERGIES:     Allergies   Allergen Reactions     Cephalexin Diarrhea and GI Disturbance     Other reaction(s): GI Upset       Cephalexin Hcl Diarrhea     Gabapentin Other (See Comments)     Dizzsiness  Shaky, dizzy, dry mouth  Dizzsiness  Shaky,dry mouth       Methotrexate Other (See Comments)     Depleted Folic Acid  And caused severe leg cramps  Severe leg cramps     Naproxen GI Disturbance, Other (See Comments) and Nausea      "Constipation. Tolerates ibuprofen.       Perfume      Sulfa Drugs Shortness Of Breath     Throat swelling     Alprazolam Other (See Comments)     Dizziness      Lactase Other (See Comments) and Unknown     Per pt - pt has lactose intolerance and cannot drink milk. Pt can tolerate other dairy products.      Levofloxacin GI Disturbance     Macrobid [Nitrofurantoin Anhydrous]      Possibly related to lung disease      Nitrofurantoin      Possibly related to lung disease      Seasonal Allergies      Ciprofloxacin Itching and Rash       FAMILY HISTORY:  Family History   Problem Relation Age of Onset     Hypertension Mother      Psychotic Disorder Father      Diabetes Son      Diabetes Daughter      Blood Disease Daughter        SOCIAL HISTORY:  Social History     Tobacco Use     Smoking status: Never Smoker     Smokeless tobacco: Never Used   Substance Use Topics     Alcohol use: Yes     Comment: rare wine      Drug use: No       ROS:   A comprehensive 10 point review of systems negative other than as mentioned in HPI.  Exam:  /76 (BP Location: Left arm, Patient Position: Chair, Cuff Size: Adult Regular)   Pulse 72   Ht 1.626 m (5' 4\")   Wt 74.1 kg (163 lb 4.8 oz)   LMP  (LMP Unknown)   SpO2 96%   BMI 28.03 kg/m     GENERAL APPEARANCE: alert and no distress  HEENT: no icterus, no xanthelasmas, normal pupil size and reaction, normal palate, mucosa moist, no central cyanosis  NECK: no adenopathy, no asymmetry, masses, or scars, thyroid normal to palpation and no bruits, JVP not elevated  RESPIRATORY: lungs clear to auscultation - no rales, rhonchi or wheezes, no use of accessory muscles, no retractions, respirations are unlabored, normal respiratory rate  CARDIOVASCULAR: irregular, faint soft murmur.  ABDOMEN: soft, non tender, bowel sounds normal, non-distended  EXTREMITIES: peripheral pulses normal, no edema  NEURO: alert and oriented to person/place/time, normal speech, gait and affect  SKIN: no ecchymoses, " no rashes  PSYCH: normal affect, cooperative    Labs:  Reviewed.     Testing/Procedures:  PULMONARY FUNCTION TESTS:   PFT Latest Ref Rng & Units 12/19/2018   FVC L 1.68   FEV1 L 1.35   FVC% % 73   FEV1% % 78         3/13/19 ECHOCARDIOGRAM:   Interpretation Summary     Left ventricular function is normal.The EF is 60-65%.  Severe biatrial enlargement is present.  Single mitraclip present. Device is well seated. The mean gradient across the  mitral valve is 5 mmHg. Trace to mild mitral insufficiency is present.  Trace to mild aortic insufficiency is present.  Right ventricular systolic pressure is 49mmHg above the right atrial pressure.  Moderate pulmonary hypertension is present.  Compared to prior TTE dated 2/12/19, there has not been significant change.      Assessment and Plan:   Ms. 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 Due to worsening SOB/RUSSELL and fatigue with severe MR she underwent a mireille-clip procedure on 2/11/19. She reports feeling much better since this procedure. A follow up echo from 3/13/19 showed LVEF 60-65%, severe bi-atrial enlargement and trace/mild MR. She saw Dr. Lynn in 5/6/19 and she reported feeling better when she has been in sinus. Her device interrogation showed she was in AF for >100 days. He adjusted her rate adaptive pacing to see if this would help her symptoms. She feels this has helped. Device interrogation shows normal pacemaker function. No ventricular arrhythmias recorded. AF burden = 100%. AP = <0.1%.  = 94.3%. No short v-v intervals recorded. Lead trends appear stable. HR histograms reveal poor HR distribution.     Atrial Fibrillation:   1. Stroke Prophylaxis:  CHADSVASC=++age, +gender, +HTN  3,  corresponding to a 3.2% annual stroke / systemic emolism event rate. indicating need for long term oral anticoagulation.  She is appropriately on Eliquis which has been renally dosed due to age, hx of variable renal function. She has had two mechanical falls resulting in bone fractures in 2018; however, no bleeding issues.   2. Rate Control: Appears to be adequately rate controlled. Continue Coreg can consider changing to diltiazem if BB an issue with her pulmonary disease; however, has tolerated well thus far.   3. Rhythm Control: Cardioversion, Antiarrhythmics and/or ablation are options for rhythm control. She has been reported to have AFL (tracings not available to us) and device interrogation now c/w AF. She had been on propafenone since 2017 with continued recurrences. Originally, multaq was going to be tried, but cost prohibitive as not covered by her insurance. Limited AAT options given variable renal function (prefer to avoid Sotalol or dofetilide). She was then started on amiodarone which was ineffective at maintaining sinus and was stopped in 11/2018. She is back in AF with controlled ventricular rates. Lower likelihood of maintaining sinus long term, so we will not employ rhythm control at this time.   4. Risk Factor Management: maintain tight BP control and JESUS MANUEL evaluation as indicated.      Diastolic Heart Failure:  Diastolic heart failure is caused by any disease process that causes stiffening of the left ventricular resulting in abnormal relaxation and impaired LV filling. Common causes include long standing hypertension, valvular heart disease, diabetes and age. There is no specific therapy to improve LV diastolic dysfunction however diuretic management plays a role in preventing volume overload and subsequent exacerbations. Treatment of the underlying disease in currently the most important therapeutic approach. Many of her symptoms were related to her severe MR and having significantly improved  with mireille clip intervention. She will need tight volume ,HR and BP control.     Her device does show poor HR histograms,so we have further adjusted her ADL rate today. She ambulated in hallway after changes and tolerated without difficulties.   She will continue to follow with [facility] and device clinic per routine.   Follow up with EP in 6 months.    The patient states understanding and is agreeable with plan.   This patient was seen and evaluated with Dr. Carrasquillo. The above note reflects our joint assessment and plan.   SUNNI Ortiz CNP  Pager: [phone]    EP STAFF NOTE  I have seen and examined the patient as part of a shared visit with RAAD Ortiz NP.  I agree with the note above. I reviewed today's vital signs and medications. I have reviewed and discussed with the advanced practice provider their physical examination, assessment, and plan   Briefly, patient feels much better with mitraclip, a bi of residual fatigue, SOB much better  My key history/exam findings are: RRR.   The key management decisions made by me: optimize rate response on device, f/u in 6 months.    Franki Carrasquillo MD [facility]  Cardiology - Electrophysiology    CC  REGINA AGUILAR

## 2019-05-22 NOTE — PATIENT INSTRUCTIONS
It was a pleasure to see you in clinic today.  Please do not hesitate to call with any questions or concerns.  You are scheduled for remote transmissions on 8/22/19, 11/25/19 and 2/26/20.  We look forward to seeing you in clinic at your next device check in 1 year.    Rose Marquez, RN, MS, CCRN  Electrophysiology Nurse Clinician  [facility]    During Business Hours Please Call:  [phone]  After Hours Please Call:  [phone] - select option #4 and ask for job code [phone]

## 2019-05-22 NOTE — NURSING NOTE
Chief Complaint   Patient presents with     Follow Up     1 mo follow-up reprogramming of PPM, persistent AF, HFpEF     Vitals were taken and medications were reconciled.     Blaire May RMA  3:14 PM

## 2019-05-23 NOTE — MR AVS SNAPSHOT
After Visit Summary   1/4/2018    Linda Spicer    MRN: [id number]           Patient Information     Date Of Birth          6/13/1931        Visit Information        Provider Department      1/4/2018 4:20 PM Tawana Hou PA-C [facility]        Today's Diagnoses     Urinary tract infection without hematuria, site unspecified    -  1    Urinary problem        Nonspecific finding on examination of urine           Follow-ups after your visit        Your next 10 appointments already scheduled     Jim 10, 2018 10:00 AM CST   Level 2 with BAY 1 INFUSION   [facility]    [address] [place] [id number]   [phone]            Jim 10, 2018  1:00 PM CST   Return Visit with Darrell Donaldson DPM   [facility] ([facility])    [address] [facility] [address]   [phone]            Feb 06, 2018  9:20 AM CST   Return Visit with Mario Davenport MD   [facility] ([facility])    [address] [facility] [address]   [phone]            Feb 07, 2018 10:00 AM CST   Level 2 with BAY 9 INFUSION   [facility]    [address] [place] [address]   [phone]            Mar 12, 2018  2:10 PM CDT   Return Visit with Emeterio Loza MD   [facility]    [address] [place] [address]   [phone]            Apr 04, 2018  4:00 PM CDT   Return Visit with DEVICE CHECK RN CARD   [facility]    [id number] [place] [address]   [phone]            Apr 23, 2018 11:00 AM CDT   New Visit with Tomi Peña MD   [facility])    [id number] [place] [address]  [id number] [phone]              Who to contact     If you have questions or need follow up information about today's clinic visit or your schedule please contact [facility] directly at [phone].  Normal or non-critical lab and imaging results will be communicated to you by L2hart, letter or phone within 4 business days after the clinic has received the results. If you do not hear from us within 7 days, please contact the clinic through L2hart or phone. If you have a critical or abnormal lab result, we will notify you by phone as soon as possible.  Submit refill requests through [employer] or call your pharmacy and they will forward the refill request to us. Please allow 3 business days for your refill to be completed.          Additional Information About Your Visit        [employer] Information     [employer] gives you secure access to your electronic health record. If you see a primary care provider, you can also send messages to your care team and make appointments. If you have questions, please call your primary care clinic.  If you do not have a primary care provider, please call [phone] and they will assist you.        Care EveryWhere ID     This is your Care EveryWhere ID. This could be used by other organizations to access your [place] medical records  [id number]        Your Vitals Were     Pulse Temperature Pulse Oximetry BMI (Body Mass Index)          62 97.6  F (36.4  C) (Oral) 98% 28.7 kg/m2         Blood Pressure from Last 3 Encounters:   12/13/17 152/61   12/07/17 140/70   11/25/17 145/65    Weight from Last 3 Encounters:   01/04/18 162 lb (73.5 kg)   12/13/17 160 lb 12.8 oz (72.9 kg)   12/07/17 159 lb (72.1 kg)              We Performed the Following     UA with Microscopic reflex to Culture     Urine Culture Aerobic Bacterial          Today's Medication Changes          These changes are accurate as of: 1/4/18 11:59 PM.  If you have any questions, ask your nurse or  doctor.               Start taking these medicines.        Dose/Directions    levofloxacin 250 MG tablet   Commonly known as:  LEVAQUIN   Used for:  Urinary tract infection without hematuria, site unspecified   Started by:  Tawana Hou PA-C        Dose:  250 mg   Take 1 tablet (250 mg) by mouth daily for 10 days   Quantity:  10 tablet   Refills:  0            Where to get your medicines      These medications were sent to [facility] [address] [place] [address]  [address] [facility] [address]     Phone:  [phone]     levofloxacin 250 MG tablet                Primary Care Provider Office Phone # Fax #    Tre Lockett [phone]       [address]  [facility] [address]        Equal Access to Services     [facility] Hospital[facility] : Hadii rakesh schuster hadasho Soomaali, waaxda luqadaha, qaybta kaalmada adeegyada, zahraa moss . So [facility] [phone].    ATENCIÓN: Si habla español, tiene a merchant disposición servicios gratuitos de asistencia lingüística. [facility] [phone].    We comply with applicable federal civil rights laws and [place] laws. We do not discriminate on the basis of race, color, national origin, age, disability, sex, sexual orientation, or gender identity.            Thank you!     Thank you for choosing [facility]  for your care. Our goal is always to provide you with excellent care. Hearing back from our patients is one way we can continue to improve our services. Please take a few minutes to complete the written survey that you may receive in the mail after your visit with us. Thank you!             Your Updated Medication List - Protect others around you: Learn how to safely use, store and throw away your medicines at www.disposemymeds.org.          This list is accurate as of: 1/4/18 11:59 PM.  Always use your most recent med list.                   Brand Name Dispense  Instructions for use Diagnosis    apixaban ANTICOAGULANT 2.5 MG tablet    ELIQUIS    60 tablet    Take 1 tablet (2.5 mg) by mouth 2 times daily    Atrial flutter, paroxysmal (H)       azaTHIOprine 50 MG tablet    IMURAN    90 tablet    Take 1 tablet (50 mg) by mouth daily    Rheumatoid arthritis involving multiple sites with positive rheumatoid factor (H)       Blood Pressure Monitor Kit     1 kit    1 kit daily as needed    CHF (congestive heart failure) (H)       calcium-vitamin D 600-400 MG-UNIT per tablet    CALTRATE     Take 1 tablet by mouth 2 times daily        Cranberry 180 MG Caps      Take 1 capsule by mouth daily Unsure of strength        fish oil-omega-3 fatty acids 1000 MG capsule      Take 2 g by mouth daily. 2 capsules daily        FLAGYL PO      Take 500 mg by mouth 2 times daily    Acute infectious diarrhea       folic acid 1 MG tablet    FOLVITE    90 tablet    Take 1 tablet (1 mg) by mouth daily    Oral aphthae       GENTEAL MILD OP      Apply  to eye daily.        hydrocortisone 2.5 % cream     30 g    Apply BID to affected region(s) for 7-10 days.    Rash       levofloxacin 250 MG tablet    LEVAQUIN    10 tablet    Take 1 tablet (250 mg) by mouth daily for 10 days    Urinary tract infection without hematuria, site unspecified       levothyroxine 75 MCG tablet    SYNTHROID/LEVOTHROID    90 tablet    TAKE ONE TABLET BY MOUTH EVERY DAY    Hypothyroidism, unspecified type       losartan 25 MG tablet    COZAAR    90 tablet    Take 2 tablets (50 mg) by mouth daily (with breakfast) Hold if SBP < 110.    Hypertension goal BP (blood pressure) < 150/90       metoprolol 25 MG tablet    LOPRESSOR    60 tablet    Take 1 tablet (25 mg) by mouth 2 times daily    Atrial fibrillation, unspecified type (H), Hypertension goal BP (blood pressure) < 150/90       nitroGLYcerin 0.4 MG sublingual tablet    NITROSTAT    25 tablet    Place 1 tablet (0.4 mg) under the tongue every 5 minutes as needed for chest pain     Chest pain       * order for DME     1 Box    Equipment being ordered: Dispense face mask. Mrs. Spicer is immunosuppressed due to rheumatoid arthritis.    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 Use with Albuterol.    Hypoxia       order for DME     1 each    Equipment being ordered: Dispense baffle, for use with nebulizer.    Pneumonia of left upper lobe due to Mycoplasma pneumoniae       * order for DME     1 each    Dispense SP Walker-small    Pain of toe of right foot, Status post bunionectomy       PRESERVISION AREDS PO      Take 1 tablet by mouth daily        ranibizumab 0.3 MG/0.05ML Soln    LUCENTIS     0.3 mg by Intravitreal route        ranitidine 150 MG tablet    ZANTAC    60 tablet    Take 1 tablet (150 mg) by mouth 2 times daily    Gastroesophageal reflux disease without esophagitis       REFRESH P.M. Oint      Apply  to eye. Daily at bedtime        senna-docusate 8.6-50 MG per tablet    SENOKOT-S;PERICOLACE    120 tablet    Take 2 tablets by mouth At Bedtime Hold if diarrhea occurs.    Constipation, chronic       SPIRONOLACTONE PO      Take 12.5 mg by mouth every morning Hold if SBP is less than 120.    Congestive heart failure with preserved LV function, NYHA class 3 (H)       triamcinolone 0.1 % cream    KENALOG    453.6 g    Apply sparingly to affected area on face three times daily.    Facial lesion       ZYRTEC ALLERGY 10 MG tablet   Generic drug:  cetirizine      Take 10 mg by mouth At Bedtime        * Notice:  This list has 3 medication(s) that are the same as other medications prescribed for you. Read the directions carefully, and ask your doctor or other care provider to review them with you.       Patient information on fall and injury prevention

## 2019-05-28 LAB
MDC_IDC_LEAD_IMPLANT_DT: NORMAL
MDC_IDC_LEAD_IMPLANT_DT: NORMAL
MDC_IDC_LEAD_LOCATION: NORMAL
MDC_IDC_LEAD_LOCATION: NORMAL
MDC_IDC_LEAD_LOCATION_DETAIL_1: NORMAL
MDC_IDC_LEAD_LOCATION_DETAIL_1: NORMAL
MDC_IDC_LEAD_MFG: NORMAL
MDC_IDC_LEAD_MFG: NORMAL
MDC_IDC_LEAD_MODEL: NORMAL
MDC_IDC_LEAD_MODEL: NORMAL
MDC_IDC_LEAD_POLARITY_TYPE: NORMAL
MDC_IDC_LEAD_POLARITY_TYPE: NORMAL
MDC_IDC_LEAD_SERIAL: NORMAL
MDC_IDC_LEAD_SERIAL: NORMAL
MDC_IDC_MSMT_BATTERY_DTM: NORMAL
MDC_IDC_MSMT_BATTERY_REMAINING_LONGEVITY: 73 MO
MDC_IDC_MSMT_BATTERY_RRT_TRIGGER: 2.83
MDC_IDC_MSMT_BATTERY_STATUS: NORMAL
MDC_IDC_MSMT_BATTERY_VOLTAGE: 3 V
MDC_IDC_MSMT_LEADCHNL_RA_IMPEDANCE_VALUE: 342 OHM
MDC_IDC_MSMT_LEADCHNL_RA_IMPEDANCE_VALUE: 513 OHM
MDC_IDC_MSMT_LEADCHNL_RA_SENSING_INTR_AMPL: 1.12 MV
MDC_IDC_MSMT_LEADCHNL_RA_SENSING_INTR_AMPL: 1.38 MV
MDC_IDC_MSMT_LEADCHNL_RV_IMPEDANCE_VALUE: 361 OHM
MDC_IDC_MSMT_LEADCHNL_RV_IMPEDANCE_VALUE: 437 OHM
MDC_IDC_MSMT_LEADCHNL_RV_PACING_THRESHOLD_AMPLITUDE: 0.75 V
MDC_IDC_MSMT_LEADCHNL_RV_PACING_THRESHOLD_PULSEWIDTH: 0.4 MS
MDC_IDC_MSMT_LEADCHNL_RV_SENSING_INTR_AMPL: 14.5 MV
MDC_IDC_MSMT_LEADCHNL_RV_SENSING_INTR_AMPL: 19 MV
MDC_IDC_PG_IMPLANT_DTM: NORMAL
MDC_IDC_PG_MFG: NORMAL
MDC_IDC_PG_MODEL: NORMAL
MDC_IDC_PG_SERIAL: NORMAL
MDC_IDC_PG_TYPE: NORMAL
MDC_IDC_SESS_CLINIC_NAME: NORMAL
MDC_IDC_SESS_DTM: NORMAL
MDC_IDC_SESS_TYPE: NORMAL
MDC_IDC_SET_BRADY_AT_MODE_SWITCH_RATE: 171 {BEATS}/MIN
MDC_IDC_SET_BRADY_HYSTRATE: NORMAL
MDC_IDC_SET_BRADY_LOWRATE: 70 {BEATS}/MIN
MDC_IDC_SET_BRADY_MAX_SENSOR_RATE: 130 {BEATS}/MIN
MDC_IDC_SET_BRADY_MAX_TRACKING_RATE: 130 {BEATS}/MIN
MDC_IDC_SET_BRADY_MODE: NORMAL
MDC_IDC_SET_BRADY_PAV_DELAY_LOW: 180 MS
MDC_IDC_SET_BRADY_SAV_DELAY_LOW: 150 MS
MDC_IDC_SET_LEADCHNL_RA_PACING_AMPLITUDE: 2 V
MDC_IDC_SET_LEADCHNL_RA_PACING_ANODE_ELECTRODE_1: NORMAL
MDC_IDC_SET_LEADCHNL_RA_PACING_ANODE_LOCATION_1: NORMAL
MDC_IDC_SET_LEADCHNL_RA_PACING_CAPTURE_MODE: NORMAL
MDC_IDC_SET_LEADCHNL_RA_PACING_CATHODE_ELECTRODE_1: NORMAL
MDC_IDC_SET_LEADCHNL_RA_PACING_CATHODE_LOCATION_1: NORMAL
MDC_IDC_SET_LEADCHNL_RA_PACING_POLARITY: NORMAL
MDC_IDC_SET_LEADCHNL_RA_PACING_PULSEWIDTH: 0.4 MS
MDC_IDC_SET_LEADCHNL_RA_SENSING_ANODE_ELECTRODE_1: NORMAL
MDC_IDC_SET_LEADCHNL_RA_SENSING_ANODE_LOCATION_1: NORMAL
MDC_IDC_SET_LEADCHNL_RA_SENSING_CATHODE_ELECTRODE_1: NORMAL
MDC_IDC_SET_LEADCHNL_RA_SENSING_CATHODE_LOCATION_1: NORMAL
MDC_IDC_SET_LEADCHNL_RA_SENSING_POLARITY: NORMAL
MDC_IDC_SET_LEADCHNL_RA_SENSING_SENSITIVITY: 0.3 MV
MDC_IDC_SET_LEADCHNL_RV_PACING_AMPLITUDE: 1.5 V
MDC_IDC_SET_LEADCHNL_RV_PACING_ANODE_ELECTRODE_1: NORMAL
MDC_IDC_SET_LEADCHNL_RV_PACING_ANODE_LOCATION_1: NORMAL
MDC_IDC_SET_LEADCHNL_RV_PACING_CAPTURE_MODE: NORMAL
MDC_IDC_SET_LEADCHNL_RV_PACING_CATHODE_ELECTRODE_1: NORMAL
MDC_IDC_SET_LEADCHNL_RV_PACING_CATHODE_LOCATION_1: NORMAL
MDC_IDC_SET_LEADCHNL_RV_PACING_POLARITY: NORMAL
MDC_IDC_SET_LEADCHNL_RV_PACING_PULSEWIDTH: 0.4 MS
MDC_IDC_SET_LEADCHNL_RV_SENSING_ANODE_ELECTRODE_1: NORMAL
MDC_IDC_SET_LEADCHNL_RV_SENSING_ANODE_LOCATION_1: NORMAL
MDC_IDC_SET_LEADCHNL_RV_SENSING_CATHODE_ELECTRODE_1: NORMAL
MDC_IDC_SET_LEADCHNL_RV_SENSING_CATHODE_LOCATION_1: NORMAL
MDC_IDC_SET_LEADCHNL_RV_SENSING_POLARITY: NORMAL
MDC_IDC_SET_LEADCHNL_RV_SENSING_SENSITIVITY: 0.9 MV
MDC_IDC_SET_ZONE_DETECTION_INTERVAL: 350 MS
MDC_IDC_SET_ZONE_DETECTION_INTERVAL: 400 MS
MDC_IDC_SET_ZONE_TYPE: NORMAL
MDC_IDC_STAT_AT_BURDEN_PERCENT: 100 %
MDC_IDC_STAT_AT_DTM_END: NORMAL
MDC_IDC_STAT_AT_DTM_START: NORMAL
MDC_IDC_STAT_BRADY_AP_VP_PERCENT: 0.05 %
MDC_IDC_STAT_BRADY_AP_VS_PERCENT: 0 %
MDC_IDC_STAT_BRADY_AS_VP_PERCENT: 94.32 %
MDC_IDC_STAT_BRADY_AS_VS_PERCENT: 5.62 %
MDC_IDC_STAT_BRADY_DTM_END: NORMAL
MDC_IDC_STAT_BRADY_DTM_START: NORMAL
MDC_IDC_STAT_BRADY_RA_PERCENT_PACED: 0.04 %
MDC_IDC_STAT_BRADY_RV_PERCENT_PACED: 94.63 %
MDC_IDC_STAT_EPISODE_RECENT_COUNT: 0
MDC_IDC_STAT_EPISODE_RECENT_COUNT: 1
MDC_IDC_STAT_EPISODE_RECENT_COUNT_DTM_END: NORMAL
MDC_IDC_STAT_EPISODE_RECENT_COUNT_DTM_START: NORMAL
MDC_IDC_STAT_EPISODE_TOTAL_COUNT: 0
MDC_IDC_STAT_EPISODE_TOTAL_COUNT: 0
MDC_IDC_STAT_EPISODE_TOTAL_COUNT: 4
MDC_IDC_STAT_EPISODE_TOTAL_COUNT: 601
MDC_IDC_STAT_EPISODE_TOTAL_COUNT_DTM_END: NORMAL
MDC_IDC_STAT_EPISODE_TOTAL_COUNT_DTM_START: NORMAL
MDC_IDC_STAT_EPISODE_TYPE: NORMAL

## 2019-06-14 ENCOUNTER — PATIENT OUTREACH (OUTPATIENT)
Dept: CARE COORDINATION | Facility: CLINIC | Age: 84
End: 2019-06-14

## 2019-06-14 ENCOUNTER — OFFICE VISIT (OUTPATIENT)
Dept: FAMILY MEDICINE | Facility: CLINIC | Age: 84
End: 2019-06-14
Payer: MEDICARE

## 2019-06-14 VITALS
HEIGHT: 64 IN | SYSTOLIC BLOOD PRESSURE: 117 MMHG | OXYGEN SATURATION: 99 % | BODY MASS INDEX: 28 KG/M2 | RESPIRATION RATE: 16 BRPM | WEIGHT: 164 LBS | TEMPERATURE: 98.1 F | HEART RATE: 76 BPM | DIASTOLIC BLOOD PRESSURE: 70 MMHG

## 2019-06-14 DIAGNOSIS — L98.9 SKIN LESION: Primary | ICD-10-CM

## 2019-06-14 PROCEDURE — 99213 OFFICE O/P EST LOW 20 MIN: CPT | Performed by: NURSE PRACTITIONER

## 2019-06-14 ASSESSMENT — ACTIVITIES OF DAILY LIVING (ADL): DEPENDENT_IADLS:: COOKING;CLEANING;LAUNDRY;SHOPPING;MEDICATION MANAGEMENT;MONEY MANAGEMENT;TRANSPORTATION

## 2019-06-14 ASSESSMENT — PAIN SCALES - GENERAL: PAINLEVEL: NO PAIN (0)

## 2019-06-14 ASSESSMENT — MIFFLIN-ST. JEOR: SCORE: 1158.9

## 2019-06-14 NOTE — PROGRESS NOTES
Clinic Care Coordination Contact  Care Team Conversations    Patient calls into RN CC to report a lump that she noticed starting yesterday between her right 4th and 5th fingers.  Patient states the lump is smaller than a pea, sensitive to touch, moveable, and tender.  Patient denies chest pain, shortness of breath, dizziness, numbness, tingling, color change, sensitivity change, fever, chills, malaise or any other symptoms.      RN CC huddled with triage nurse Regla PAZ who agreed with RN CC to have patient seen today based on protocol and clinical judgement.     RN CC scheduled an appointment for patient at 2:00 pm with primary care.    Plan: RN CC will follow up with patient next week to determine if there are any further care coordination needs.    Melissa Behl BSN, RN, PHN  Primary Care Clinical RN Care Coordinator  [facility]   [phone]

## 2019-06-14 NOTE — PROGRESS NOTES
Subjective     Linda Spicer is a 88 year old female who presents to clinic today for the following health issues:    HPI   Concern - bump between fingers  Onset: noticed yesterday    Description:   Tiny bump in between right ring and pinky finger.    Intensity: mild    Progression of Symptoms:  sore    Accompanying Signs & Symptoms:  Tender to touch or movement of finger    Previous history of similar problem:   No    Precipitating factors:   Worsened by: movement of pinky finger    Alleviating factors:  Improved by: None.    Therapies Tried and outcome: none.    Here with daughter. Bump is skin colored, mobile. Tender with palpation. Doesn't want to ice or apply heat because it makes arthritis worse.    Patient Active Problem List   Diagnosis     Hypertension goal BP (blood pressure) < 150/90     Hypothyroidism     Seropositive rheumatoid arthritis (H)     Macular degeneration, left eye     Irritable bowel syndrome     Encounter for palliative care     Adjustment disorder with anxious mood     Mild anemia     DDD (degenerative disc disease), lumbar     CKD (chronic kidney disease) stage 3, GFR 30-59 ml/min (H)     History of blood transfusion     Aftercare following surgery     S/P lumbar laminectomy     High risk medication use     Age-related osteoporosis without current pathological fracture     Atrophic vaginitis     Fecal incontinence     Female stress incontinence     Impingement syndrome of both shoulders     UIP (usual interstitial pneumonitis) (H)     High risk medications (not anticoagulants) long-term use     Heart failure with preserved ejection fraction (H)     Congestive heart failure with preserved LV function, NYHA class 3 (H)     Other specified hypothyroidism     Rheumatoid arthritis involving multiple sites with positive rheumatoid factor (H)     Health Care Home     Sick sinus syndrome (H)     Cardiac pacemaker - Medtronic dual lead pacemaker - Not Dependent - MRI Safe     Immunosuppression  (H)     ILD (interstitial lung disease) (H)     Heart failure with preserved ejection fraction, NYHA class I (H)     Atrial flutter (H)     Persistent atrial fibrillation (H)     CHF exacerbation (H)     Pre-syncope     (HFpEF) heart failure with preserved ejection fraction (H)     Mitral regurgitation     SOB (shortness of breath)     Mitral valve insufficiency, unspecified etiology     Abnormal findings on diagnostic imaging of cardiovascular system     Status post coronary angiogram     Dyspnea     RUSSELL (dyspnea on exertion)     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     Swelling of right lower extremity     Past Surgical History:   Procedure Laterality Date     ANESTHESIA CARDIOVERSION N/A 9/14/2018    Procedure: ANESTHESIA CARDIOVERSION;;  Surgeon: GENERIC ANESTHESIA PROVIDER;  Location: [facility]     ANESTHESIA CARDIOVERSION N/A 10/11/2018    Procedure: ANESTHESIA CARDIOVERSION;  Cardioversion;  Surgeon: GENERIC ANESTHESIA PROVIDER;  Location:  OR     ANESTHESIA CARDIOVERSION N/A 10/16/2018    Procedure: Anesthesia Cardioversion ;  Surgeon: GENERIC ANESTHESIA PROVIDER;  Location:  OR     APPENDECTOMY       BIOPSY      hemorrhoidectomy     CV HEART CATHETERIZATION WITH POSSIBLE INTERVENTION N/A 1/31/2019    Procedure: CORS,LHC,RHC-YOLANDA BEFORE CATH APPOINTMENT;  Surgeon: Avila Watson MD;  Location:  HEART CARDIAC CATH LAB     CV MITRACLIP N/A 2/11/2019    Procedure: Mitraclip Procedure;  Surgeon: Avila Watosn MD;  Location:  OR     ENT SURGERY      tonsillectomy     GYN SURGERY      3 D & C's     HYSTERECTOMY, PAP NO LONGER INDICATED       LAMINECTOMY LUMBAR ONE LEVEL N/A 10/13/2015    Procedure: LAMINECTOMY LUMBAR ONE LEVEL;  Surgeon: Fransico Toussaint MD;  Location:  OR       Social History     Tobacco Use     Smoking status: Never Smoker     Smokeless tobacco: Never Used   Substance Use Topics     Alcohol use: Yes     Comment: rare wine      Family History   Problem Relation Age of  Onset     Hypertension Mother      Psychotic Disorder Father      Diabetes Son      Diabetes Daughter      Blood Disease Daughter          Current Outpatient Medications   Medication Sig Dispense Refill     apixaban ANTICOAGULANT (ELIQUIS) 2.5 MG tablet Take 1 tablet (2.5 mg) by mouth 2 times daily Stop taking 2/9 (last dose evening of Feb 8, 2019) in preparation for MitraClip Procedure on Monday 60 tablet 0     azaTHIOprine (IMURAN) 50 MG tablet Take 1 tablet (50 mg) by mouth daily 30 tablet 1     COMPOUNDED NON-CONTROLLED SUBSTANCE (CMPD RX) - PHARMACY TO MIX COMPOUNDED MEDICATION Estriol 1mg/gram. Place 1 gram vaginally daily for 2 weeks. Then vaginally twice weekly 30 g 6     denosumab (PROLIA) 60 MG/ML SOLN injection Inject 1 mL (60 mg) Subcutaneous every 6 months 1 mL      diphenhydrAMINE (BENADRYL) 25 MG tablet Take 25 mg by mouth as needed Patient takes 25-50mg 1-2 times a day.       furosemide (LASIX) 20 MG tablet Take 1 tablet (20 mg) by mouth 2 times daily 270 tablet 3     hydrALAZINE (APRESOLINE) 10 MG tablet Take 1 tablet (10 mg) by mouth 3 times daily 90 tablet 3     isosorbide mononitrate (IMDUR) 60 MG 24 hr tablet Take 1 tablet (60 mg) by mouth daily 90 tablet 1     levothyroxine (SYNTHROID/LEVOTHROID) 75 MCG tablet Take 1 tablet (75 mcg) by mouth daily 90 tablet 0     order for DME Dispense SP Walker-small 1 each 0     order for DME Equipment being ordered: Dispense baffle, for use with nebulizer. 1 each 0     order for DME Equipment being ordered: Nebulizer. Use with Albuterol. 1 each 0     order for DME Equipment being ordered: Dispense face mask.  Mrs. Spicer is immunosuppressed due to rheumatoid arthritis. 1 Box 11     ranitidine (ZANTAC) 150 MG tablet Take 1 tablet (150 mg) by mouth At Bedtime       senna-docusate (SENOKOT-S/PERICOLACE) 8.6-50 MG tablet Take 1 tablet by mouth daily as needed for constipation 100 tablet 3     trimethoprim (TRIMPEX) 100 MG tablet Take 0.5 tablets (50 mg) by mouth  "daily 45 tablet 0     albuterol (PROVENTIL) (2.5 MG/3ML) 0.083% neb solution Take 1 vial (2.5 mg) by nebulization every 6 hours as needed for shortness of breath / dyspnea or wheezing 360 mL      carvedilol (COREG) 3.125 MG tablet Take 1 tablet (3.125 mg) by mouth 2 times daily (with meals) 60 tablet 0     nitroGLYcerin (NITROSTAT) 0.4 MG sublingual tablet Place 1 tablet (0.4 mg) under the tongue every 5 minutes as needed for chest pain 25 tablet 11     Allergies   Allergen Reactions     Cephalexin Diarrhea and GI Disturbance     Other reaction(s): GI Upset       Cephalexin Hcl Diarrhea     Gabapentin Other (See Comments)     Dizzsiness  Shaky, dizzy, dry mouth  Dizzsiness  Shaky,dry mouth       Methotrexate Other (See Comments)     Depleted Folic Acid  And caused severe leg cramps  Severe leg cramps     Naproxen GI Disturbance, Other (See Comments) and Nausea     Constipation. Tolerates ibuprofen.       Perfume      Sulfa Drugs Shortness Of Breath     Throat swelling     Alprazolam Other (See Comments)     Dizziness      Lactase Other (See Comments) and Unknown     Per pt - pt has lactose intolerance and cannot drink milk. Pt can tolerate other dairy products.      Levofloxacin GI Disturbance     Macrobid [Nitrofurantoin Anhydrous]      Possibly related to lung disease      Nitrofurantoin      Possibly related to lung disease      Seasonal Allergies      Ciprofloxacin Itching and Rash         Reviewed and updated as needed this visit by Provider  Tobacco  Allergies  Meds  Problems  Med Hx  Surg Hx  Fam Hx         Review of Systems   ROS COMP: Constitutional, HEENT, cardiovascular, pulmonary, gi and gu systems are negative, except as otherwise noted.      Objective    /70 (BP Location: Left arm, Patient Position: Chair, Cuff Size: Adult Large)   Pulse 76   Temp 98.1  F (36.7  C) (Oral)   Resp 16   Ht 1.626 m (5' 4\")   Wt 74.4 kg (164 lb)   LMP  (LMP Unknown)   SpO2 99%   BMI 28.15 kg/m    Body " "mass index is 28.15 kg/m .  Physical Exam   GENERAL: healthy, alert and no distress  SKIN: 2-3 mm tender, mobile, skin colored lesion between right fingers. No bruising or erythema.   PSYCH: mentation appears normal, affect normal/bright    Diagnostic Test Results:  none         Assessment & Plan     1. Skin lesion  Not concerning for DVT or superficial phleblitis. Looks more papular.  Recommend continuing to monitor.  Follow up with primary care provider if worsening.       BMI:   Estimated body mass index is 28.15 kg/m  as calculated from the following:    Height as of this encounter: 1.626 m (5' 4\").    Weight as of this encounter: 74.4 kg (164 lb).           See Patient Instructions    Return in about 3 days (around 6/17/2019), or if symptoms worsen or fail to improve.     Return precautions discussed, including when to seek urgent/emergent care.    Patient verbalizes understanding and agrees with plan of care. Patient stable for discharge.      SUNNI Polanco [facility]      "

## 2019-06-18 ENCOUNTER — PATIENT OUTREACH (OUTPATIENT)
Dept: CARE COORDINATION | Facility: CLINIC | Age: 84
End: 2019-06-18

## 2019-06-18 ASSESSMENT — ACTIVITIES OF DAILY LIVING (ADL): DEPENDENT_IADLS:: COOKING;CLEANING;LAUNDRY;SHOPPING;MEDICATION MANAGEMENT;MONEY MANAGEMENT;TRANSPORTATION

## 2019-06-18 NOTE — PROGRESS NOTES
Clinic Care Coordination Contact  Care Team Conversations    Patient was seen by primary care 6/14/19 for skin lesion between right 4th and 5th fingers.  Patient reports the lesion and pain is lessoning, but will keep her appointment scheduled with PCP as scheduled for 6/20/19 incase it does not resolve.  Patient denies any further need for care coordination at this time.    Melissa Behl BSN, RN, PHN  Primary Care Clinical RN Care Coordinator  [facility]   [phone]

## 2019-06-20 ENCOUNTER — INFUSION THERAPY VISIT (OUTPATIENT)
Dept: INFUSION THERAPY | Facility: CLINIC | Age: 84
End: 2019-06-20
Payer: MEDICARE

## 2019-06-20 VITALS
TEMPERATURE: 97.9 F | DIASTOLIC BLOOD PRESSURE: 68 MMHG | OXYGEN SATURATION: 97 % | WEIGHT: 162.5 LBS | BODY MASS INDEX: 27.89 KG/M2 | HEART RATE: 70 BPM | RESPIRATION RATE: 18 BRPM | SYSTOLIC BLOOD PRESSURE: 109 MMHG

## 2019-06-20 DIAGNOSIS — M81.0 AGE-RELATED OSTEOPOROSIS WITHOUT CURRENT PATHOLOGICAL FRACTURE: Primary | ICD-10-CM

## 2019-06-20 DIAGNOSIS — N18.30 CKD (CHRONIC KIDNEY DISEASE) STAGE 3, GFR 30-59 ML/MIN (H): ICD-10-CM

## 2019-06-20 DIAGNOSIS — M05.79 RHEUMATOID ARTHRITIS INVOLVING MULTIPLE SITES WITH POSITIVE RHEUMATOID FACTOR (H): Primary | ICD-10-CM

## 2019-06-20 DIAGNOSIS — Z79.899 HIGH RISK MEDICATION USE: ICD-10-CM

## 2019-06-20 DIAGNOSIS — M05.9 SEROPOSITIVE RHEUMATOID ARTHRITIS (H): ICD-10-CM

## 2019-06-20 DIAGNOSIS — M81.0 AGE-RELATED OSTEOPOROSIS WITHOUT CURRENT PATHOLOGICAL FRACTURE: ICD-10-CM

## 2019-06-20 LAB
ALBUMIN SERPL-MCNC: 3.6 G/DL (ref 3.4–5)
ALP SERPL-CCNC: 67 U/L (ref 40–150)
ALT SERPL W P-5'-P-CCNC: 43 U/L (ref 0–50)
AST SERPL W P-5'-P-CCNC: 44 U/L (ref 0–45)
BASOPHILS # BLD AUTO: 0 10E9/L (ref 0–0.2)
BASOPHILS NFR BLD AUTO: 0.6 %
BILIRUB DIRECT SERPL-MCNC: 0.1 MG/DL (ref 0–0.2)
BILIRUB SERPL-MCNC: 0.4 MG/DL (ref 0.2–1.3)
CALCIUM SERPL-MCNC: 9.3 MG/DL (ref 8.5–10.1)
CREAT SERPL-MCNC: 1.71 MG/DL (ref 0.52–1.04)
DIFFERENTIAL METHOD BLD: NORMAL
EOSINOPHIL # BLD AUTO: 0.2 10E9/L (ref 0–0.7)
EOSINOPHIL NFR BLD AUTO: 3.4 %
ERYTHROCYTE [DISTWIDTH] IN BLOOD BY AUTOMATED COUNT: 14.1 % (ref 10–15)
GFR SERPL CREATININE-BSD FRML MDRD: 26 ML/MIN/{1.73_M2}
HCT VFR BLD AUTO: 37 % (ref 35–47)
HGB BLD-MCNC: 12.2 G/DL (ref 11.7–15.7)
IMM GRANULOCYTES # BLD: 0 10E9/L (ref 0–0.4)
IMM GRANULOCYTES NFR BLD: 0.4 %
LYMPHOCYTES # BLD AUTO: 0.8 10E9/L (ref 0.8–5.3)
LYMPHOCYTES NFR BLD AUTO: 16.3 %
MCH RBC QN AUTO: 31.9 PG (ref 26.5–33)
MCHC RBC AUTO-ENTMCNC: 33 G/DL (ref 31.5–36.5)
MCV RBC AUTO: 97 FL (ref 78–100)
MONOCYTES # BLD AUTO: 0.6 10E9/L (ref 0–1.3)
MONOCYTES NFR BLD AUTO: 12.5 %
NEUTROPHILS # BLD AUTO: 3.3 10E9/L (ref 1.6–8.3)
NEUTROPHILS NFR BLD AUTO: 66.8 %
PHOSPHATE SERPL-MCNC: 3.9 MG/DL (ref 2.5–4.5)
PLATELET # BLD AUTO: 217 10E9/L (ref 150–450)
PROT SERPL-MCNC: 7.5 G/DL (ref 6.8–8.8)
RBC # BLD AUTO: 3.83 10E12/L (ref 3.8–5.2)
WBC # BLD AUTO: 5 10E9/L (ref 4–11)

## 2019-06-20 PROCEDURE — 96365 THER/PROPH/DIAG IV INF INIT: CPT | Performed by: NURSE PRACTITIONER

## 2019-06-20 PROCEDURE — 96372 THER/PROPH/DIAG INJ SC/IM: CPT | Mod: 59 | Performed by: NURSE PRACTITIONER

## 2019-06-20 PROCEDURE — 85025 COMPLETE CBC W/AUTO DIFF WBC: CPT | Performed by: INTERNAL MEDICINE

## 2019-06-20 PROCEDURE — 84100 ASSAY OF PHOSPHORUS: CPT | Performed by: INTERNAL MEDICINE

## 2019-06-20 PROCEDURE — 82310 ASSAY OF CALCIUM: CPT | Performed by: NURSE PRACTITIONER

## 2019-06-20 PROCEDURE — 99207 ZZC NO CHARGE LOS: CPT

## 2019-06-20 PROCEDURE — 36415 COLL VENOUS BLD VENIPUNCTURE: CPT | Performed by: NURSE PRACTITIONER

## 2019-06-20 PROCEDURE — 82565 ASSAY OF CREATININE: CPT | Performed by: INTERNAL MEDICINE

## 2019-06-20 PROCEDURE — 80076 HEPATIC FUNCTION PANEL: CPT | Performed by: INTERNAL MEDICINE

## 2019-06-20 ASSESSMENT — PAIN SCALES - GENERAL: PAINLEVEL: NO PAIN (0)

## 2019-06-20 NOTE — PROGRESS NOTES
SPIRITUAL HEALTH SERVICES  SPIRITUAL ASSESSMENT Progress Note  [facility]     REFERRAL SOURCE: Self referral follow up  visit    Visited with the patient who was in the infusion clinic accompanied by  her son Arjun.  She states that she has six adult children, three of whom help take her to her appointments.  Patient shared that she continues to do what she can for herself. She is having some difficulty with her eye sight so reading a book for example is no longer an option but she enjoys playing computer games. Patient requested prayer and [employer] communion today.     PLAN: Patient knows she can request a  visit and I will continue to check in with her periodically for spiritual support.     Adrianne Gomez  Staff   Pager [phone]

## 2019-06-20 NOTE — PROGRESS NOTES
Infusion Nursing Note:  Linda Spicer presents today for Prolia injection and Orencia infusion.    Patient seen by provider today: No   present during visit today: Not Applicable.    Note: N/A.    Intravenous Access:  Peripheral IV placed.    Treatment Conditions:  Lab Results   Component Value Date    HGB 12.2 06/20/2019     Lab Results   Component Value Date    WBC 5.0 06/20/2019      Lab Results   Component Value Date    ANEU 3.3 06/20/2019     Lab Results   Component Value Date     06/20/2019      Lab Results   Component Value Date     03/26/2019                   Lab Results   Component Value Date    POTASSIUM 3.9 03/26/2019           Lab Results   Component Value Date    MAG 2.1 02/12/2019            Lab Results   Component Value Date    CR 1.71 06/20/2019                   Lab Results   Component Value Date    FATOUMATA 8.0 03/26/2019                Lab Results   Component Value Date    BILITOTAL 0.4 06/20/2019           Lab Results   Component Value Date    ALBUMIN 3.6 06/20/2019                    Lab Results   Component Value Date    ALT 43 06/20/2019           Lab Results   Component Value Date    AST 44 06/20/2019       Biological Infusion Checklist:  ~~~ NOTE: If the patient answers yes to any of the questions below, hold the infusion and contact ordering provider or on-call provider.    1. Have you recently had an elevated temperature, fever, chills, productive cough, coughing for 3 weeks or longer or hemoptysis, abnormal vital signs, night sweats,  chest pain or have you noticed a decrease in your appetite, unexplained weight loss or fatigue? No  2. Do you have any open wounds or new incisions? No  3. Do you have any recent or upcoming hospitalizations, surgeries or dental procedures? No  4. Do you currently have or recently have had any signs of illness or infection or are you on any antibiotics? Yes, Takes Trimethoprim to prevent UTIs  5. Have you had any new, sudden or worsening  abdominal pain? No  6. Have you or anyone in your household received a live vaccination in the past 4 weeks? Please note:  No live vaccines while on biologic/chemotherapy until 6 months after the last treatment.  Patient can receive the flu vaccine (shot only) and the pneumovax.  It is optimal for the patient to get these vaccines mid cycle, but they can be given at any time as long as it is not on the day of the infusion. No  7. Have you recently been diagnosed with any new nervous system diseases (ie. Multiple sclerosis, Guillain Leonardo, seizures, neurological changes) or cancer diagnosis? No  8. Are you on any form of radiation or chemotherapy? No  9. Have there been any other new onset medical symptoms? No  Patient is taking Calcium and Vitamin D.  Patient denies any dental issues at this time.  Last dental check up was in April/May 2019.    Post Infusion Assessment:  Patient tolerated infusion without incident.  Blood return noted pre and post infusion.  Site patent and intact, free from redness, edema or discomfort.  No evidence of extravasations.  Access discontinued per protocol.       Discharge Plan:   Patient will return 7/18/19 for next appointment.   Patient discharged in stable condition accompanied by: Jessica.  Departure Mode: Ambulatory with rolling walker.    Sweta Floyd RN

## 2019-06-21 DIAGNOSIS — E03.9 HYPOTHYROIDISM, UNSPECIFIED TYPE: ICD-10-CM

## 2019-06-22 NOTE — TELEPHONE ENCOUNTER
"Requested Prescriptions   Pending Prescriptions Disp Refills     levothyroxine (SYNTHROID/LEVOTHROID) 75 MCG tablet [Pharmacy Med Name: LEVOTHYROXINE 75 MCG TABLET]  Last Written Prescription Date:  3/28/19  Last Fill Quantity: 90,  # refills: 0   Last Office Visit with FMGORDON, TATIANA or [facility] prescribing provider:  6/14/19   Future Office Visit:    Next 5 appointments (look out 90 days)    Jul 16, 2019 10:00 AM CDT  Return Visit with Mario Davenport MD  [facility] ([facility] [address] [place] [id number]  [phone]          90 tablet 0     Sig: TAKE 1 TABLET BY MOUTH EVERY DAY       Thyroid Protocol Passed - 6/21/2019  4:28 PM        Passed - Patient is 12 years or older        Passed - Recent (12 mo) or future (30 days) visit within the authorizing provider's specialty     Patient had office visit in the last 12 months or has a visit in the next 30 days with authorizing provider or within the authorizing provider's specialty.  See \"Patient Info\" tab in inbasket, or \"Choose Columns\" in Meds & Orders section of the refill encounter.              Passed - Medication is active on med list        Passed - Normal TSH on file in past 12 months     Recent Labs   Lab Test 01/18/19  0447   TSH 0.73              Passed - No active pregnancy on record     If patient is pregnant or has had a positive pregnancy test, please check TSH.          Passed - No positive pregnancy test in past 12 months     If patient is pregnant or has had a positive pregnancy test, please check TSH.            "

## 2019-06-24 ENCOUNTER — TELEPHONE (OUTPATIENT)
Dept: UROLOGY | Facility: CLINIC | Age: 84
End: 2019-06-24

## 2019-06-24 DIAGNOSIS — R30.0 DYSURIA: Primary | ICD-10-CM

## 2019-06-24 DIAGNOSIS — R30.0 DYSURIA: ICD-10-CM

## 2019-06-24 LAB
ALBUMIN UR-MCNC: NEGATIVE MG/DL
APPEARANCE UR: CLEAR
BACTERIA #/AREA URNS HPF: ABNORMAL /HPF
BILIRUB UR QL STRIP: NEGATIVE
COLOR UR AUTO: YELLOW
GLUCOSE UR STRIP-MCNC: NEGATIVE MG/DL
HGB UR QL STRIP: NEGATIVE
KETONES UR STRIP-MCNC: NEGATIVE MG/DL
LEUKOCYTE ESTERASE UR QL STRIP: ABNORMAL
NITRATE UR QL: NEGATIVE
NON-SQ EPI CELLS #/AREA URNS LPF: ABNORMAL /LPF
PH UR STRIP: 6.5 PH (ref 5–7)
RBC #/AREA URNS AUTO: ABNORMAL /HPF
SOURCE: ABNORMAL
SP GR UR STRIP: 1.01 (ref 1–1.03)
UROBILINOGEN UR STRIP-ACNC: 0.2 EU/DL (ref 0.2–1)
WBC #/AREA URNS AUTO: ABNORMAL /HPF

## 2019-06-24 PROCEDURE — 81001 URINALYSIS AUTO W/SCOPE: CPT | Performed by: PHYSICIAN ASSISTANT

## 2019-06-24 PROCEDURE — 87186 SC STD MICRODIL/AGAR DIL: CPT | Performed by: PHYSICIAN ASSISTANT

## 2019-06-24 PROCEDURE — 87088 URINE BACTERIA CULTURE: CPT | Performed by: PHYSICIAN ASSISTANT

## 2019-06-24 PROCEDURE — 87086 URINE CULTURE/COLONY COUNT: CPT | Performed by: PHYSICIAN ASSISTANT

## 2019-06-24 RX ORDER — LEVOTHYROXINE SODIUM 75 UG/1
TABLET ORAL
Qty: 90 TABLET | Refills: 0 | Status: SHIPPED | OUTPATIENT
Start: 2019-06-24 | End: 2019-09-17

## 2019-06-24 NOTE — TELEPHONE ENCOUNTER
[facility] Call Center    Phone Message    May a detailed message be left on voicemail: yes    Reason for Call: Order(s): Other:   Reason for requested: Patient is having symptoms of a UTI   Date needed: asap  Provider name: Dr. Steven      Action Taken: Message routed to:  Adult Clinics: Urology p [id number]

## 2019-06-24 NOTE — TELEPHONE ENCOUNTER
Prescription approved per [facility] Refill Protocol.  Regla Escalona RN

## 2019-06-24 NOTE — TELEPHONE ENCOUNTER
Call to speak with patient symptoms include urinary frequency, urine is cloudy and no foul odor with pink tinged. Denies burning with urination. Denies the following symptoms fever, chills, and nausea. Ordered UA/UC per protocol. Patient states she has the sterile cup at home, instructed on how to obtain midstream urine, proper hygiene, and to drop off sample at lab within hour of obtaining urine sample. Kelley Chowdhury RN

## 2019-06-25 ENCOUNTER — TELEPHONE (OUTPATIENT)
Dept: UROLOGY | Facility: CLINIC | Age: 84
End: 2019-06-25

## 2019-06-25 NOTE — TELEPHONE ENCOUNTER
Left message on voicemail to return call. Asia Steevn PA-C recommends:If still acutely symptomatic, can start cefdinir 300 mg BID x 5 days (okay to override interaction/allergy warning as she has tolerated this medication in the past).   If symptoms are tolerable, then recommend we await final culture results to guide treatment.   Awaiting response from patient to obtain update on present symptoms/status. Kelley Chowdhury RN

## 2019-06-25 NOTE — TELEPHONE ENCOUNTER
[facility] Call Center    Phone Message    May a detailed message be left on voicemail: yes    Reason for Call: Requesting Results   Name/type of test: UA   Date of test: 06/25/2019  Was test done at a location other than [facility] (Please fill in the location if not [facility])?: Yes, [facility]      Action Taken: Message routed to:  Adult Clinics: Urology p [id number]

## 2019-06-25 NOTE — TELEPHONE ENCOUNTER
eKlley Chowdhury, RN 2 hours ago (1:22 PM)         Call to patient left message to return call. Advised that awaiting urine results and that lab was contacted to obtain more information. Advised that need updated information on her present symptoms and to call with update. Awaiting response. Kelley Chowdhury RN

## 2019-06-26 NOTE — TELEPHONE ENCOUNTER
Call to lab, spoke with [employer] who stated that sensitivities will not be done until tonight or early tomorrow morning. Still unable to reach patient at present time. Awaiting response. Kelley Chowdhury RN

## 2019-06-27 LAB
BACTERIA SPEC CULT: ABNORMAL
SPECIMEN SOURCE: ABNORMAL

## 2019-06-27 RX ORDER — AMOXICILLIN 875 MG
875 TABLET ORAL 2 TIMES DAILY
Qty: 10 TABLET | Refills: 0 | Status: CANCELLED | OUTPATIENT
Start: 2019-06-27 | End: 2019-07-02

## 2019-06-27 RX ORDER — AMOXICILLIN 500 MG/1
500 CAPSULE ORAL 2 TIMES DAILY
Qty: 14 CAPSULE | Refills: 0 | Status: SHIPPED | OUTPATIENT
Start: 2019-06-27 | End: 2019-09-05

## 2019-06-27 NOTE — TELEPHONE ENCOUNTER
Spoke with patient and advised that per Asia Steven PA-C her urine culture shows sensitivity to amoxicillin. Advised to take Amoxicillin 875 mg po BID X 5 days. Advised patient to stop taking her Trimethoprim, begin her Amoxicillin, and then to resume her Trimethoprim after she is done with the Amoxicillin. No further questions or concerns offered. Kelley Chowdhury RN  Upon review of kidney function test results, advised to instead use Amoxicillin 500 mg po BID X 7 days per Asia Steven PA-C, note sent to pharmacy to advise patient of the modification when she picks up the medication. Kelley Chowdhury RN

## 2019-07-05 ENCOUNTER — APPOINTMENT (OUTPATIENT)
Dept: GENERAL RADIOLOGY | Facility: CLINIC | Age: 84
End: 2019-07-05
Attending: EMERGENCY MEDICINE
Payer: MEDICARE

## 2019-07-05 ENCOUNTER — OFFICE VISIT (OUTPATIENT)
Dept: URGENT CARE | Facility: URGENT CARE | Age: 84
End: 2019-07-05
Payer: MEDICARE

## 2019-07-05 ENCOUNTER — HOSPITAL ENCOUNTER (EMERGENCY)
Facility: CLINIC | Age: 84
Discharge: HOME | End: 2019-07-05
Attending: EMERGENCY MEDICINE | Admitting: EMERGENCY MEDICINE
Payer: MEDICARE

## 2019-07-05 ENCOUNTER — APPOINTMENT (OUTPATIENT)
Dept: CARDIOLOGY | Facility: CLINIC | Age: 84
End: 2019-07-05
Attending: EMERGENCY MEDICINE
Payer: MEDICARE

## 2019-07-05 VITALS
DIASTOLIC BLOOD PRESSURE: 74 MMHG | TEMPERATURE: 97.7 F | SYSTOLIC BLOOD PRESSURE: 125 MMHG | BODY MASS INDEX: 27.7 KG/M2 | WEIGHT: 161.4 LBS | OXYGEN SATURATION: 98 % | HEART RATE: 75 BPM

## 2019-07-05 VITALS
SYSTOLIC BLOOD PRESSURE: 169 MMHG | HEART RATE: 70 BPM | TEMPERATURE: 97.9 F | RESPIRATION RATE: 18 BRPM | DIASTOLIC BLOOD PRESSURE: 80 MMHG | OXYGEN SATURATION: 99 %

## 2019-07-05 DIAGNOSIS — R06.02 SOB (SHORTNESS OF BREATH): ICD-10-CM

## 2019-07-05 DIAGNOSIS — R30.0 DYSURIA: Primary | ICD-10-CM

## 2019-07-05 DIAGNOSIS — R53.83 OTHER FATIGUE: ICD-10-CM

## 2019-07-05 DIAGNOSIS — B02.9 HERPES ZOSTER WITHOUT COMPLICATION: ICD-10-CM

## 2019-07-05 DIAGNOSIS — Z95.0 CARDIAC PACEMAKER IN SITU: ICD-10-CM

## 2019-07-05 DIAGNOSIS — R53.1 GENERALIZED WEAKNESS: ICD-10-CM

## 2019-07-05 LAB
ALBUMIN UR-MCNC: NEGATIVE MG/DL
ANION GAP SERPL CALCULATED.3IONS-SCNC: 7 MMOL/L (ref 3–14)
APPEARANCE UR: CLEAR
BASOPHILS # BLD AUTO: 0 10E9/L (ref 0–0.2)
BASOPHILS NFR BLD AUTO: 0.7 %
BILIRUB UR QL STRIP: NEGATIVE
BUN SERPL-MCNC: 27 MG/DL (ref 7–30)
CALCIUM SERPL-MCNC: 8.5 MG/DL (ref 8.5–10.1)
CHLORIDE SERPL-SCNC: 104 MMOL/L (ref 94–109)
CO2 SERPL-SCNC: 24 MMOL/L (ref 20–32)
COLOR UR AUTO: YELLOW
CREAT SERPL-MCNC: 1.15 MG/DL (ref 0.52–1.04)
DIFFERENTIAL METHOD BLD: NORMAL
EOSINOPHIL # BLD AUTO: 0.2 10E9/L (ref 0–0.7)
EOSINOPHIL NFR BLD AUTO: 2.9 %
ERYTHROCYTE [DISTWIDTH] IN BLOOD BY AUTOMATED COUNT: 14.5 % (ref 10–15)
GFR SERPL CREATININE-BSD FRML MDRD: 42 ML/MIN/{1.73_M2}
GLUCOSE SERPL-MCNC: 87 MG/DL (ref 70–99)
GLUCOSE UR STRIP-MCNC: NEGATIVE MG/DL
HCT VFR BLD AUTO: 39 % (ref 35–47)
HGB BLD-MCNC: 12.3 G/DL (ref 11.7–15.7)
HGB UR QL STRIP: NEGATIVE
IMM GRANULOCYTES # BLD: 0 10E9/L (ref 0–0.4)
IMM GRANULOCYTES NFR BLD: 0.2 %
KETONES UR STRIP-MCNC: NEGATIVE MG/DL
LEUKOCYTE ESTERASE UR QL STRIP: NEGATIVE
LYMPHOCYTES # BLD AUTO: 1.2 10E9/L (ref 0.8–5.3)
LYMPHOCYTES NFR BLD AUTO: 19.7 %
MCH RBC QN AUTO: 31.1 PG (ref 26.5–33)
MCHC RBC AUTO-ENTMCNC: 31.5 G/DL (ref 31.5–36.5)
MCV RBC AUTO: 99 FL (ref 78–100)
MONOCYTES # BLD AUTO: 0.8 10E9/L (ref 0–1.3)
MONOCYTES NFR BLD AUTO: 13.7 %
NEUTROPHILS # BLD AUTO: 3.7 10E9/L (ref 1.6–8.3)
NEUTROPHILS NFR BLD AUTO: 62.8 %
NITRATE UR QL: NEGATIVE
NRBC # BLD AUTO: 0 10*3/UL
NRBC BLD AUTO-RTO: 0 /100
PH UR STRIP: 7 PH (ref 5–7)
PLATELET # BLD AUTO: 235 10E9/L (ref 150–450)
POTASSIUM SERPL-SCNC: 4.1 MMOL/L (ref 3.4–5.3)
POTASSIUM SERPL-SCNC: 5.5 MMOL/L (ref 3.4–5.3)
RBC # BLD AUTO: 3.95 10E12/L (ref 3.8–5.2)
SODIUM SERPL-SCNC: 136 MMOL/L (ref 133–144)
SOURCE: NORMAL
SP GR UR STRIP: 1.01 (ref 1–1.03)
TROPONIN I SERPL-MCNC: <0.015 UG/L (ref 0–0.04)
UROBILINOGEN UR STRIP-ACNC: 0.2 EU/DL (ref 0.2–1)
WBC # BLD AUTO: 5.8 10E9/L (ref 4–11)

## 2019-07-05 PROCEDURE — 84484 ASSAY OF TROPONIN QUANT: CPT | Performed by: EMERGENCY MEDICINE

## 2019-07-05 PROCEDURE — 93306 TTE W/DOPPLER COMPLETE: CPT

## 2019-07-05 PROCEDURE — 71046 X-RAY EXAM CHEST 2 VIEWS: CPT

## 2019-07-05 PROCEDURE — 36415 COLL VENOUS BLD VENIPUNCTURE: CPT | Performed by: EMERGENCY MEDICINE

## 2019-07-05 PROCEDURE — 80048 BASIC METABOLIC PNL TOTAL CA: CPT | Performed by: EMERGENCY MEDICINE

## 2019-07-05 PROCEDURE — 84132 ASSAY OF SERUM POTASSIUM: CPT | Mod: 91 | Performed by: EMERGENCY MEDICINE

## 2019-07-05 PROCEDURE — 93306 TTE W/DOPPLER COMPLETE: CPT | Mod: 26 | Performed by: INTERNAL MEDICINE

## 2019-07-05 PROCEDURE — 99285 EMERGENCY DEPT VISIT HI MDM: CPT | Mod: 25 | Performed by: EMERGENCY MEDICINE

## 2019-07-05 PROCEDURE — 81003 URINALYSIS AUTO W/O SCOPE: CPT | Performed by: PHYSICIAN ASSISTANT

## 2019-07-05 PROCEDURE — 85025 COMPLETE CBC W/AUTO DIFF WBC: CPT | Performed by: EMERGENCY MEDICINE

## 2019-07-05 PROCEDURE — 99215 OFFICE O/P EST HI 40 MIN: CPT | Performed by: PHYSICIAN ASSISTANT

## 2019-07-05 PROCEDURE — 93010 ELECTROCARDIOGRAM REPORT: CPT | Mod: Z6 | Performed by: EMERGENCY MEDICINE

## 2019-07-05 PROCEDURE — 93005 ELECTROCARDIOGRAM TRACING: CPT | Performed by: EMERGENCY MEDICINE

## 2019-07-05 RX ORDER — VALACYCLOVIR HYDROCHLORIDE 1 G/1
1000 TABLET, FILM COATED ORAL 2 TIMES DAILY
Qty: 14 TABLET | Refills: 0 | Status: SHIPPED | OUTPATIENT
Start: 2019-07-05 | End: 2019-07-12

## 2019-07-05 ASSESSMENT — ENCOUNTER SYMPTOMS
MYALGIAS: 0
ACTIVITY CHANGE: 0
DIARRHEA: 0
NECK PAIN: 0
DIARRHEA: 0
FLANK PAIN: 0
CARDIOVASCULAR NEGATIVE: 1
FEVER: 0
HEMATURIA: 0
NEUROLOGICAL NEGATIVE: 1
HEADACHES: 0
WEAKNESS: 1
SHORTNESS OF BREATH: 1
NAUSEA: 0
DYSURIA: 0
DIZZINESS: 0
ADENOPATHY: 0
APPETITE CHANGE: 1
VOMITING: 0
CHEST TIGHTNESS: 0
VOMITING: 0
WEAKNESS: 0
NECK STIFFNESS: 0
FREQUENCY: 0
ENDOCRINE NEGATIVE: 1
POLYDIPSIA: 0
PALPITATIONS: 0
GASTROINTESTINAL NEGATIVE: 1
FATIGUE: 1
BACK PAIN: 1
FATIGUE: 1
COUGH: 0
CHILLS: 0
RHINORRHEA: 0
LIGHT-HEADEDNESS: 0
ABDOMINAL PAIN: 0
SORE THROAT: 0

## 2019-07-05 NOTE — PROGRESS NOTES
"Chief Complaint:    Chief Complaint   Patient presents with     UTI     Patient complains of  fatigue and lower back pain. worried about shingles on lower back        HPI:  Linda Spicer is a 88 year old female who has symptoms of fatigue ad low back pain.  Symptoms started 7 days ago and have been worsening.  She is now having back pain, severe fatigue, and some shortness of breath.  She did have a UTI, and states that her frequency and urgency resolved after taking the Amoxicillin.  Last dose of this was 2 days ago.  She denies any chest pain, fever, nausea, vomiting, abdominal pain, or diarrhea.      Patient has a complicated medical Hx \"see problem list below.\"    ROS:      Review of Systems   Constitutional: Positive for fatigue. Negative for activity change, chills and fever.   HENT: Negative for congestion, ear pain, rhinorrhea and sore throat.    Respiratory: Positive for shortness of breath. Negative for cough.    Cardiovascular: Negative.  Negative for chest pain and palpitations.   Gastrointestinal: Negative.  Negative for abdominal pain, diarrhea, nausea and vomiting.   Endocrine: Negative.  Negative for polydipsia and polyuria.   Genitourinary: Negative for dysuria, flank pain, frequency, hematuria, pelvic pain, urgency, vaginal discharge and vaginal pain.   Musculoskeletal: Positive for back pain. Negative for myalgias, neck pain and neck stiffness.   Allergic/Immunologic: Negative for immunocompromised state.   Neurological: Negative.  Negative for dizziness, weakness, light-headedness and headaches.   Hematological: Negative for adenopathy.       Family History   Family History   Problem Relation Age of Onset     Hypertension Mother      Psychotic Disorder Father      Diabetes Son      Diabetes Daughter      Blood Disease Daughter         Problem history  Patient Active Problem List   Diagnosis     Hypertension goal BP (blood pressure) < 150/90     Hypothyroidism     Seropositive rheumatoid " arthritis (H)     Macular degeneration, left eye     Irritable bowel syndrome     Encounter for palliative care     Adjustment disorder with anxious mood     Mild anemia     DDD (degenerative disc disease), lumbar     CKD (chronic kidney disease) stage 3, GFR 30-59 ml/min (H)     History of blood transfusion     Aftercare following surgery     S/P lumbar laminectomy     High risk medication use     Age-related osteoporosis without current pathological fracture     Atrophic vaginitis     Fecal incontinence     Female stress incontinence     Impingement syndrome of both shoulders     UIP (usual interstitial pneumonitis) (H)     High risk medications (not anticoagulants) long-term use     Heart failure with preserved ejection fraction (H)     Congestive heart failure with preserved LV function, NYHA class 3 (H)     Other specified hypothyroidism     Rheumatoid arthritis involving multiple sites with positive rheumatoid factor (H)     Health Care Home     Sick sinus syndrome (H)     Cardiac pacemaker - Medtronic dual lead pacemaker - Not Dependent - MRI Safe     Immunosuppression (H)     ILD (interstitial lung disease) (H)     Heart failure with preserved ejection fraction, NYHA class I (H)     Atrial flutter (H)     Persistent atrial fibrillation (H)     CHF exacerbation (H)     Pre-syncope     (HFpEF) heart failure with preserved ejection fraction (H)     Mitral regurgitation     SOB (shortness of breath)     Mitral valve insufficiency, unspecified etiology     Abnormal findings on diagnostic imaging of cardiovascular system     Status post coronary angiogram     Dyspnea     RUSSELL (dyspnea on exertion)     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 Tolerates ibuprofen.       Perfume      Sulfa Drugs Shortness Of Breath     Throat swelling     Alprazolam Other (See Comments)     Dizziness      Lactase Other (See Comments) and Unknown     Per pt - pt has lactose intolerance and cannot drink milk. Pt can tolerate other dairy products.      Levofloxacin GI Disturbance     Macrobid [Nitrofurantoin Anhydrous]      Possibly related to lung disease      Nitrofurantoin      Possibly related to lung disease      Seasonal Allergies      Ciprofloxacin Itching and Rash        Social History  Social History     Socioeconomic History     Marital status:      Spouse name: Not on file     Number of children: Not on file     Years of education: Not on file     Highest education level: Not on file   Occupational History     Not on file   Social Needs     Financial resource strain: Not on file     Food insecurity:     Worry: Not on file     Inability: Not on file     Transportation needs:     Medical: Not on file     Non-medical: Not on file   Tobacco Use     Smoking status: Never Smoker     Smokeless tobacco: Never Used   Substance and Sexual Activity     Alcohol use: Yes     Comment: rare wine      Drug use: No     Sexual activity: Never   Lifestyle     Physical activity:     Days per week: 0 days     Minutes per session: 0 min     Stress: Not on file   Relationships     Social connections:     Talks on phone: Not on file     Gets together: Not on file     Attends Anabaptist service: Not on file     Active member of club or organization: Not on file     Attends meetings of clubs or organizations: Not on file     Relationship status: Not on file     Intimate partner violence:     Fear of current or ex partner: Not on file     Emotionally abused: Not  on file     Physically abused: Not on file     Forced sexual activity: Not on file   Other Topics Concern     Parent/sibling w/ CABG, MI or angioplasty before 65F 55M? No   Social History Narrative    . Has six children. She enjoys bridge and genealogy.  passed away.  Her son has financial and alcohol issues.        Current Meds    Current Outpatient Medications:      apixaban ANTICOAGULANT (ELIQUIS) 2.5 MG tablet, Take 1 tablet (2.5 mg) by mouth 2 times daily Stop taking 2/9 (last dose evening of Feb 8, 2019) in preparation for MitraClip Procedure on Monday, Disp: 60 tablet, Rfl: 0     azaTHIOprine (IMURAN) 50 MG tablet, Take 1 tablet (50 mg) by mouth daily, Disp: 30 tablet, Rfl: 1     COMPOUNDED NON-CONTROLLED SUBSTANCE (CMPD RX) - PHARMACY TO MIX COMPOUNDED MEDICATION, Estriol 1mg/gram. Place 1 gram vaginally daily for 2 weeks. Then vaginally twice weekly, Disp: 30 g, Rfl: 6     denosumab (PROLIA) 60 MG/ML SOLN injection, Inject 1 mL (60 mg) Subcutaneous every 6 months, Disp: 1 mL, Rfl:      diphenhydrAMINE (BENADRYL) 25 MG tablet, Take 25 mg by mouth as needed Patient takes 25-50mg 1-2 times a day., Disp: , Rfl:      furosemide (LASIX) 20 MG tablet, Take 1 tablet (20 mg) by mouth 2 times daily, Disp: 270 tablet, Rfl: 3     hydrALAZINE (APRESOLINE) 10 MG tablet, Take 1 tablet (10 mg) by mouth 3 times daily, Disp: 90 tablet, Rfl: 3     isosorbide mononitrate (IMDUR) 60 MG 24 hr tablet, Take 1 tablet (60 mg) by mouth daily, Disp: 90 tablet, Rfl: 1     levothyroxine (SYNTHROID/LEVOTHROID) 75 MCG tablet, TAKE 1 TABLET BY MOUTH EVERY DAY, Disp: 90 tablet, Rfl: 0     order for DME, Dispense SP Walker-small, Disp: 1 each, Rfl: 0     order for DME, Equipment being ordered: Dispense baffle, for use with nebulizer., Disp: 1 each, Rfl: 0     order for DME, Equipment being ordered: Nebulizer. Use with Albuterol., Disp: 1 each, Rfl: 0     order for DME, Equipment being ordered: Dispense face mask.  Dannielle is  immunosuppressed due to rheumatoid arthritis., Disp: 1 Box, Rfl: 11     ranitidine (ZANTAC) 150 MG tablet, Take 1 tablet (150 mg) by mouth At Bedtime, Disp: , Rfl:      senna-docusate (SENOKOT-S/PERICOLACE) 8.6-50 MG tablet, Take 1 tablet by mouth daily as needed for constipation, Disp: 100 tablet, Rfl: 3     trimethoprim (TRIMPEX) 100 MG tablet, Take 0.5 tablets (50 mg) by mouth daily, Disp: 45 tablet, Rfl: 0     albuterol (PROVENTIL) (2.5 MG/3ML) 0.083% neb solution, Take 1 vial (2.5 mg) by nebulization every 6 hours as needed for shortness of breath / dyspnea or wheezing, Disp: 360 mL, Rfl:      carvedilol (COREG) 3.125 MG tablet, Take 1 tablet (3.125 mg) by mouth 2 times daily (with meals), Disp: 60 tablet, Rfl: 0     nitroGLYcerin (NITROSTAT) 0.4 MG sublingual tablet, Place 1 tablet (0.4 mg) under the tongue every 5 minutes as needed for chest pain, Disp: 25 tablet, Rfl: 11     OBJECTIVE     Vital signs noted and reviewed by Fransico Victor  /74 (BP Location: Left arm, Patient Position: Chair, Cuff Size: Adult Regular)   Pulse 75   Temp 97.7  F (36.5  C) (Oral)   Wt 73.2 kg (161 lb 6.4 oz)   LMP  (LMP Unknown)   SpO2 98%   BMI 27.70 kg/m       Physical Exam   Constitutional: She is oriented to person, place, and time. She appears well-developed and well-nourished. She is cooperative.  Non-toxic appearance. She does not have a sickly appearance. She does not appear ill. No distress.   HENT:   Head: Normocephalic and atraumatic.   Right Ear: Tympanic membrane and external ear normal.   Left Ear: Tympanic membrane and external ear normal.   Mouth/Throat: Oropharynx is clear and moist.   Eyes: Pupils are equal, round, and reactive to light. EOM are normal.   Neck: Normal range of motion. Neck supple.   Cardiovascular: Normal rate, regular rhythm, normal heart sounds and intact distal pulses. Exam reveals no gallop and no friction rub.   No murmur heard.  Pulmonary/Chest: Effort normal and breath sounds  normal. No respiratory distress. She has no wheezes. She has no rales. She exhibits no tenderness.   Abdominal: Soft. Normal appearance and bowel sounds are normal. She exhibits no distension and no mass. There is no hepatosplenomegaly. There is no tenderness. There is no rigidity, no rebound, no guarding, no CVA tenderness, no tenderness at McBurney's point and negative Pizano's sign.   Lymphadenopathy:     She has no cervical adenopathy.   Neurological: She is alert and oriented to person, place, and time. She has normal reflexes. No cranial nerve deficit.   Skin: Skin is warm and dry. She is not diaphoretic.   Psychiatric: She has a normal mood and affect. Her behavior is normal. Judgment and thought content normal.   Nursing note and vitals reviewed.            Labs:     Results for orders placed or performed in visit on 07/05/19   *UA reflex to Microscopic and Culture ([facility] (except [place] and [place])   Result Value Ref Range    Color Urine Yellow     Appearance Urine Clear     Glucose Urine Negative NEG^Negative mg/dL    Bilirubin Urine Negative NEG^Negative    Ketones Urine Negative NEG^Negative mg/dL    Specific Gravity Urine 1.010 1.003 - 1.035    Blood Urine Negative NEG^Negative    pH Urine 7.0 5.0 - 7.0 pH    Protein Albumin Urine Negative NEG^Negative mg/dL    Urobilinogen Urine 0.2 0.2 - 1.0 EU/dL    Nitrite Urine Negative NEG^Negative    Leukocyte Esterase Urine Negative NEG^Negative    Source Midstream Urine      *Note: Due to a large number of results and/or encounters for the requested time period, some results have not been displayed. A complete set of results can be found in Results Review.           ASSESSMENT     1. Dysuria    2. Other fatigue    3. SOB (shortness of breath)           PLAN  Patient presents with fatigue, back pain and shortness of breath.  She is afebrile, and vital signs are stable.  Urinalysis discussed with patient.  This was unremarkable for  UTI.  Patient is having difficult time catching breath, and is clearly in discomfort.  At this time, I cannot rule out PE, renal failure.  Patient instructed to go to the ED now for further evaluation.  Patient declined EMS transport.  She is here with her son who will drive her.  Patient verbalized understanding and agreed with this plan.  She will follow up with her PCP early next week.          Fransico Victor  7/5/2019, 11:43 AM

## 2019-07-05 NOTE — ED PROVIDER NOTES
[place] EMERGENCY DEPARTMENT ([facility])  July 5, 2019    History     Chief Complaint   Patient presents with     Fatigue     HPI  Linda Spicer is a 88 year old female with a history of CKD (stage 3), CHF, cardiac pacemaker (2017), atrial fibrillation, status post mitral valve clip implantation (2/11/19), HTN, and hypothyroidism who presents to the ED with her son for evaluation of increased weakness and fatigue. Patient reports she suddenly felt tired and weak last Thursday (6/27/19). She states she was doing well and could walk half a mile several times a day, but then starting Thursday she couldn't walk as much. Patient complains she has increased weakness and fatigue over the last week and can't walk that much now. She feels as though she has to force herself to do anything. Patient states she has had a spot on her back and shoulder blade that started to itch and became painful last night. She notes she has had shingles in the past. This morning, she was still extremely tired even though she has been napping more often than usual. Her son notes she has been more unsteady over the past week; she has had to hold onto things when she is walking. She states she went to her clinic today and her urine was checked and was unremarkable. She had a UTI about a week ago and finished her antibiotics 2 days ago. She was recommended to come to the ED by her PCP. Patient notes she has allergies to antibiotics and is unsure if the itching she is experiencing on her back is due to the Amoxicillin she just finished. Patient denies diarrhea, vomiting, chest pain, or chest tightness. Of note, she has not had an appetite recently. She states she is trying not to drink over 2 L of water per day and is on a sodium restricted diet. Of note, patient states she is always in atrial fibrillation.      PAST MEDICAL HISTORY  Past Medical History:   Diagnosis Date     Adjustment disorder with anxious mood 5/18/2015      Advanced directives, counseling/discussion 8/30/2012    Patient states has Advance Directive and will bring in a copy to clinic. 8/30/2012   [facility] Medical Assistant \       Anemia 9/25/2015     Basal cell cancer      CHF (congestive heart failure) (H) 9/18/2014     CKD (chronic kidney disease) stage 3, GFR 30-59 ml/min (H) 9/29/2015     DDD (degenerative disc disease), lumbar 9/25/2015     Diffuse idiopathic pulmonary fibrosis (H) 5/6/2013     Encounter for palliative care 5/18/2015     History of blood transfusion 9/29/2015     Hypertension goal BP (blood pressure) < 140/90 9/7/2012     Hypothyroid 9/7/2012     Irritable bowel syndrome 10/29/2013     Macular degeneration      Macular degeneration, 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     Nondisplaced spiral fracture of shaft of humerus      Osteoporosis 8/13/2013     Imo Update utility     RA (rheumatoid arthritis) (H) 5/7/2013     Rheumatic fever      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 2/20/2019     Shingles      Spinal stenosis of lumbar region with neurogenic claudication 9/14/2015     PAST SURGICAL HISTORY  Past Surgical History:   Procedure Laterality Date     ANESTHESIA CARDIOVERSION N/A 9/14/2018    Procedure: ANESTHESIA CARDIOVERSION;;  Surgeon: GENERIC ANESTHESIA PROVIDER;  Location: [facility] OR     ANESTHESIA CARDIOVERSION N/A 10/11/2018    Procedure: ANESTHESIA CARDIOVERSION;  Cardioversion;  Surgeon: GENERIC ANESTHESIA PROVIDER;  Location: [facility] OR     ANESTHESIA CARDIOVERSION N/A 10/16/2018    Procedure: Anesthesia Cardioversion ;  Surgeon: GENERIC ANESTHESIA PROVIDER;  Location: [facility] OR     APPENDECTOMY       BIOPSY      hemorrhoidectomy     CV HEART CATHETERIZATION WITH POSSIBLE INTERVENTION N/A 1/31/2019    Procedure: CORS,LHC,RHC-YOLANDA BEFORE CATH APPOINTMENT;  Surgeon: Avila Watson MD;  Location:  HEART CARDIAC CATH LAB     CV MITRACLIP N/A 2/11/2019    Procedure: Mitraclip Procedure;  Surgeon: Avila Watson MD;  Location:  OR     ENT  SURGERY      tonsillectomy     GYN SURGERY      3 D & C's     HYSTERECTOMY, PAP NO LONGER INDICATED       LAMINECTOMY LUMBAR ONE LEVEL N/A 10/13/2015    Procedure: LAMINECTOMY LUMBAR ONE LEVEL;  Surgeon: Fransico Toussaint MD;  Location:  OR     FAMILY HISTORY  Family History   Problem Relation Age of Onset     Hypertension Mother      Psychotic Disorder Father      Diabetes Son      Diabetes Daughter      Blood Disease Daughter      SOCIAL HISTORY  Social History     Tobacco Use     Smoking status: Never Smoker     Smokeless tobacco: Never Used   Substance Use Topics     Alcohol use: Yes     Comment: rare wine      MEDICATIONS  No current facility-administered medications for this encounter.      Current Outpatient Medications   Medication     valACYclovir (VALTREX) 1000 mg tablet     albuterol (PROVENTIL) (2.5 MG/3ML) 0.083% neb solution     apixaban ANTICOAGULANT (ELIQUIS) 2.5 MG tablet     azaTHIOprine (IMURAN) 50 MG tablet     carvedilol (COREG) 3.125 MG tablet     COMPOUNDED NON-CONTROLLED SUBSTANCE (CMPD RX) - PHARMACY TO MIX COMPOUNDED MEDICATION     denosumab (PROLIA) 60 MG/ML SOLN injection     diphenhydrAMINE (BENADRYL) 25 MG tablet     furosemide (LASIX) 20 MG tablet     hydrALAZINE (APRESOLINE) 10 MG tablet     isosorbide mononitrate (IMDUR) 60 MG 24 hr tablet     levothyroxine (SYNTHROID/LEVOTHROID) 75 MCG tablet     nitroGLYcerin (NITROSTAT) 0.4 MG sublingual tablet     order for DME     order for DME     order for DME     order for DME     ranitidine (ZANTAC) 150 MG tablet     senna-docusate (SENOKOT-S/PERICOLACE) 8.6-50 MG tablet     trimethoprim (TRIMPEX) 100 MG tablet     ALLERGIES  Allergies   Allergen Reactions     Cephalexin Diarrhea and GI Disturbance     Other reaction(s): GI Upset       Cephalexin Hcl Diarrhea     Gabapentin Other (See Comments)     Dizzsiness  Shaky, dizzy, dry mouth  Dizzsiness  Shaky,dry mouth       Methotrexate Other (See Comments)     Depleted Folic Acid  And  caused severe leg cramps  Severe leg cramps     Naproxen GI Disturbance, Other (See Comments) and Nausea     Constipation. Tolerates ibuprofen.       Perfume      Sulfa Drugs Shortness Of Breath     Throat swelling     Alprazolam Other (See Comments)     Dizziness      Lactase Other (See Comments) and Unknown     Per pt - pt has lactose intolerance and cannot drink milk. Pt can tolerate other dairy products.      Levofloxacin GI Disturbance     Macrobid [Nitrofurantoin Anhydrous]      Possibly related to lung disease      Nitrofurantoin      Possibly related to lung disease      Seasonal Allergies      Ciprofloxacin Itching and Rash       I have reviewed the Medications, Allergies, Past Medical and Surgical History, and Social History in the Epic system.    Review of Systems   Constitutional: Positive for appetite change (lack of appetite) and fatigue.   Respiratory: Negative for chest tightness.    Cardiovascular: Negative for chest pain.   Gastrointestinal: Negative for diarrhea and vomiting.   Skin:        Positive for itchy skin.   Neurological: Positive for weakness (generalized).   All other systems reviewed and are negative.      Physical Exam   BP: 141/63  Pulse: 70  Heart Rate: 69  Temp: 97.9  F (36.6  C)  Resp: 16  SpO2: 98 %      Physical Exam   Gen:A&Ox3, no acute distress  HEENT:PERRL, no facial tenderness or wounds, head atraumatic, oropharynx clear, mucous membranes moist, TMs clear bilaterally  Neck:no bony tenderness or step offs, no JVD, trachea midline  Back: no CVA tenderness, no midline bony tenderness  CV:RRR without murmurs  PULM:Clear to auscultation bilaterally  Abd:soft, nontender, nondistended. Bowel sounds present and normal  UE:No traumatic injuries, skin normal  LE:no traumatic injuries, skin normal, no LE edema.   Neuro:CN II-XII intact, strength 5/5 throughout  Skin: no rashes or ecchymoses      ED Course        Procedures             EKG Interpretation:      Interpreted by Aleyda  Caro Jewell  Time reviewed: 14:25  Symptoms at time of EKG: fatigue   Rhythm: ventricular-paced  Rate: 71  Axis: normal  Ectopy: none  Conduction: normal  ST Segments/ T Waves: No ST-T wave changes  Q Waves: none  Comparison to prior: Unchanged    Clinical Impression: v paced      Critical Care time:  none      Labs Ordered and Resulted from Time of ED Arrival Up to the Time of Departure from the ED   BASIC METABOLIC PANEL - Abnormal; Notable for the following components:       Result Value    Potassium 5.5 (*)     Creatinine 1.15 (*)     GFR Estimate 42 (*)     GFR Estimate If Black 49 (*)     All other components within normal limits   CBC WITH PLATELETS DIFFERENTIAL   TROPONIN I   POTASSIUM         Assessments & Plan (with Medical Decision Making)   87 yo F presenting with fatigue and generalized weakness. Hx of CKD, CHG, atrial fibrillation, pacemaker in place, hx of mitral valve clip.  Recent antibiotics for UTI, though repeat UA in clinic today without signs of ongoing infection.   DDx broad and included ACS, heart failure, anemia, valve disorder. Has skin discomfort concerning for early shingles.   IV access obtained and lab testing done.   EKG V paced and unchanged.   CBC unremarkable.   BMP with improved Cr of 1.15. K hemolyzed at 5.5, repeated and normal at 4.1.   Troponin negative.   CXR with stable basilar areas of fibrosis, no acute infiltrates.   Low suspicion for PE. Wells score of 0, + on PERC only due to her age. Has been compliant with anticoagulation. Echocardiogram without signs of right heart strain, so no additional work up was not pursued.   Echo EF 55-60%. Mild mitral inflow gradient increase.     Will treat with course of valacyclovir for early shingles. Follow up with Cardiology regarding mitral gradient increase.   Follow up with primary care in the next week if not improving.       I have reviewed the nursing notes.    I have reviewed the findings, diagnosis, plan and need for follow up  with the patient.      Final diagnoses:   Herpes zoster without complication   Generalized weakness     ITawana, am serving as a trained medical scribe to document services personally performed by Aleyda Jewell MD, based on the provider's statements to me.      I, Aleyda Jewell MD, was physically present and have reviewed and verified the accuracy of this note documented by Tawana Mederos.     7/5/2019   [facility], [place], EMERGENCY DEPARTMENT    MD JORDANA Knight Katrina Anne, MD  07/06/19 0115

## 2019-07-05 NOTE — ED AVS SNAPSHOT
[facility], [place], Emergency Department  [address] [place] [address]  Phone:  [phone]                                    Linda Spicer   MRN: [id number]    Department:  [facility], Emergency Department   Date of Visit:  7/5/2019           After Visit Summary Signature Page    I have received my discharge instructions, and my questions have been answered. I have discussed any challenges I see with this plan with the nurse or doctor.    ..........................................................................................................................................  Patient/Patient Representative Signature      ..........................................................................................................................................  Patient Representative Print Name and Relationship to Patient    ..................................................               ................................................  Date                                   Time    ..........................................................................................................................................  Reviewed by Signature/Title    ...................................................              ..............................................  Date                                               Time          22EPIC Rev 08/18

## 2019-07-06 NOTE — DISCHARGE INSTRUCTIONS
Thank you for coming to the [facility] Emergency Department.     Please follow up with your primary care clinic in the next week if your energy has not improved.   Keep your scheduled follow up with Cardiology in August.     Return to the ER for:  Chest pain  Shortness of breath  Inability to walk  Fever >100.4F    For the back pain/burning, start treatment for likely shingles: valacyclovir

## 2019-07-07 LAB — INTERPRETATION ECG - MUSE: NORMAL

## 2019-07-09 ENCOUNTER — TELEPHONE (OUTPATIENT)
Dept: FAMILY MEDICINE | Facility: CLINIC | Age: 84
End: 2019-07-09

## 2019-07-09 NOTE — TELEPHONE ENCOUNTER
Reason for Call:  Other personal    Detailed comments: patient has some personal questions regarding a lab please call patient.    Phone Number Patient can be reached at: Cell number on file:    Telephone Information:   Mobile [phone]       Best Time: Any    Can we leave a detailed message on this number? YES    Call taken on 7/9/2019 at 12:44 PM by Zay Willis

## 2019-07-09 NOTE — TELEPHONE ENCOUNTER
Patient is wanting to know what test she needs ordered for Dr Davenport and if that can be done Friday after her appointment with Dr Lockett.    Set up ER follow up with Dr Lockett for Friday. Wants to discuss shingles.    Sena Lloyd RN, [facility] Triage

## 2019-07-10 NOTE — TELEPHONE ENCOUNTER
Called and spoke to patient, labs are not needed as she just had them done on 6/20/2019. Patient understood and had no questions.  Nicole Will [facility] Rheumatology  7/10/2019 10:41 AM

## 2019-07-12 ENCOUNTER — OFFICE VISIT (OUTPATIENT)
Dept: FAMILY MEDICINE | Facility: CLINIC | Age: 84
End: 2019-07-12
Payer: MEDICARE

## 2019-07-12 VITALS
SYSTOLIC BLOOD PRESSURE: 137 MMHG | HEART RATE: 76 BPM | OXYGEN SATURATION: 98 % | DIASTOLIC BLOOD PRESSURE: 83 MMHG | HEIGHT: 64 IN | WEIGHT: 158.4 LBS | BODY MASS INDEX: 27.04 KG/M2 | RESPIRATION RATE: 18 BRPM | TEMPERATURE: 97.9 F

## 2019-07-12 DIAGNOSIS — B02.9 HERPES ZOSTER WITHOUT COMPLICATION: Primary | ICD-10-CM

## 2019-07-12 DIAGNOSIS — E03.9 HYPOTHYROIDISM, UNSPECIFIED TYPE: ICD-10-CM

## 2019-07-12 DIAGNOSIS — Z23 NEED FOR SHINGLES VACCINE: ICD-10-CM

## 2019-07-12 PROCEDURE — 99214 OFFICE O/P EST MOD 30 MIN: CPT | Performed by: INTERNAL MEDICINE

## 2019-07-12 RX ORDER — VALACYCLOVIR HYDROCHLORIDE 1 G/1
1000 TABLET, FILM COATED ORAL 2 TIMES DAILY
Qty: 14 TABLET | Refills: 2 | Status: ON HOLD | OUTPATIENT
Start: 2019-07-12 | End: 2019-09-26

## 2019-07-12 ASSESSMENT — PAIN SCALES - GENERAL: PAINLEVEL: NO PAIN (0)

## 2019-07-12 ASSESSMENT — MIFFLIN-ST. JEOR: SCORE: 1133.5

## 2019-07-12 NOTE — PROGRESS NOTES
Subjective     Linda Spicer is a 88 year old female who presents to clinic today for the following health issues:    HPI   ED/UC Followup:    Facility:  [facility]  Date of visit: 07/05/2019  Reason for visit: Fatigue, Shingles  Current Status: patient is still a little fatigued. The pain and itching has gone away. She is considered immunosuppressed since she takes Azathioprine and Orencia for rheumatoid arthritis.       Hypothyroidism Follow-up      Since last visit, patient describes the following symptoms: Weight gain of 5 pounds within two months, no hair loss, no skin changes, no constipation, no loose stools      Patient Active Problem List   Diagnosis     Hypertension goal BP (blood pressure) < 150/90     Hypothyroidism     Seropositive rheumatoid arthritis (H)     Macular degeneration, left eye     Irritable bowel syndrome     Encounter for palliative care     Adjustment disorder with anxious mood     Mild anemia     DDD (degenerative disc disease), lumbar     CKD (chronic kidney disease) stage 3, GFR 30-59 ml/min (H)     History of blood transfusion     Aftercare following surgery     S/P lumbar laminectomy     High risk medication use     Age-related osteoporosis without current pathological fracture     Atrophic vaginitis     Fecal incontinence     Female stress incontinence     Impingement syndrome of both shoulders     UIP (usual interstitial pneumonitis) (H)     High risk medications (not anticoagulants) long-term use     Heart failure with preserved ejection fraction (H)     Congestive heart failure with preserved LV function, NYHA class 3 (H)     Other specified hypothyroidism     Rheumatoid arthritis involving multiple sites with positive rheumatoid factor (H)     Health Care Home     Sick sinus syndrome (H)     Cardiac pacemaker - Medtronic dual lead pacemaker - Not Dependent - MRI Safe     Immunosuppression (H)     ILD (interstitial lung disease) (H)     Heart failure with preserved ejection  fraction, NYHA class I (H)     Atrial flutter (H)     Persistent atrial fibrillation (H)     CHF exacerbation (H)     Pre-syncope     (HFpEF) heart failure with preserved ejection fraction (H)     Mitral regurgitation     SOB (shortness of breath)     Mitral valve insufficiency, unspecified etiology     Abnormal findings on diagnostic imaging of cardiovascular system     Status post coronary angiogram     Dyspnea     RUSSELL (dyspnea on exertion)     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     Swelling of right lower extremity     Past Surgical History:   Procedure Laterality Date     ANESTHESIA CARDIOVERSION N/A 9/14/2018    Procedure: ANESTHESIA CARDIOVERSION;;  Surgeon: GENERIC ANESTHESIA PROVIDER;  Location: [facility] OR     ANESTHESIA CARDIOVERSION N/A 10/11/2018    Procedure: ANESTHESIA CARDIOVERSION;  Cardioversion;  Surgeon: GENERIC ANESTHESIA PROVIDER;  Location: [facility] OR     ANESTHESIA CARDIOVERSION N/A 10/16/2018    Procedure: Anesthesia Cardioversion ;  Surgeon: GENERIC ANESTHESIA PROVIDER;  Location:  OR     APPENDECTOMY       BIOPSY      hemorrhoidectomy     CV HEART CATHETERIZATION WITH POSSIBLE INTERVENTION N/A 1/31/2019    Procedure: CORS,LHC,RHC-YOLANDA BEFORE CATH APPOINTMENT;  Surgeon: Avila Watson MD;  Location:  HEART CARDIAC CATH LAB     CV MITRACLIP N/A 2/11/2019    Procedure: Mitraclip Procedure;  Surgeon: Avila Watson MD;  Location:  OR     ENT SURGERY      tonsillectomy     GYN SURGERY      3 D & C's     HYSTERECTOMY, PAP NO LONGER INDICATED       LAMINECTOMY LUMBAR ONE LEVEL N/A 10/13/2015    Procedure: LAMINECTOMY LUMBAR ONE LEVEL;  Surgeon: Fransico Toussaint MD;  Location:  OR       Social History     Tobacco Use     Smoking status: Never Smoker     Smokeless tobacco: Never Used   Substance Use Topics     Alcohol use: Yes     Comment: rare wine      Family History   Problem Relation Age of Onset     Hypertension Mother      Psychotic Disorder Father      Diabetes Son       Diabetes Daughter      Blood Disease Daughter          Allergies   Allergen Reactions     Cephalexin Diarrhea and GI Disturbance     Other reaction(s): GI Upset       Cephalexin Hcl Diarrhea     Gabapentin Other (See Comments)     Dizzsiness  Shaky, dizzy, dry mouth  Dizzsiness  Shaky,dry mouth       Methotrexate Other (See Comments)     Depleted Folic Acid  And caused severe leg cramps  Severe leg cramps     Naproxen GI Disturbance, Other (See Comments) and Nausea     Constipation. Tolerates ibuprofen.       Perfume      Sulfa Drugs Shortness Of Breath     Throat swelling     Alprazolam Other (See Comments)     Dizziness      Lactase Other (See Comments) and Unknown     Per pt - pt has lactose intolerance and cannot drink milk. Pt can tolerate other dairy products.      Levofloxacin GI Disturbance     Macrobid [Nitrofurantoin Anhydrous]      Possibly related to lung disease      Nitrofurantoin      Possibly related to lung disease      Seasonal Allergies      Ciprofloxacin Itching and Rash     Recent Labs   Lab Test 07/05/19  1827 07/05/19  1618 06/20/19  0919  02/11/19  1225 02/11/19  0625  01/18/19  0447  11/28/18  1558  08/09/18  0958  08/30/17  1214  06/03/16  0756   LDL  --   --   --   --   --   --   --   --   --   --   --  87  --  76  --  109*   HDL  --   --   --   --   --   --   --   --   --   --   --  59  --  50  --  47*   TRIG  --   --   --   --   --   --   --   --   --   --   --  51  --  101  --  75   ALT  --   --  43  --  16 17   < >  --    < > 21   < >  --    < >  --    < > 36   CR  --  1.15* 1.71*   < > 1.71* 1.85*   < > 1.55*   < > 1.69*   < > 1.38*   < >  --    < > 1.44*   GFRESTIMATED  --  42* 26*   < > 26* 24*   < > 30*   < > 29*   < > 36*   < >  --    < > 35*   GFRESTBLACK  --  49* 30*   < > 31* 28*   < > 34*   < > 35*   < > 44*   < >  --    < > 42*   POTASSIUM 4.1 5.5*  --    < > 3.8 3.8   < > 4.1   < > 3.8   < > 5.5*   < >  --    < > 5.1   TSH  --   --   --   --   --   --   --  0.73  --  0.87    "< >  --    < > 0.77   < >  --     < > = values in this interval not displayed.      BP Readings from Last 3 Encounters:   07/18/19 116/58   07/16/19 134/76   07/12/19 137/83    Wt Readings from Last 3 Encounters:   07/18/19 71.9 kg (158 lb 8 oz)   07/16/19 72 kg (158 lb 12.8 oz)   07/12/19 71.8 kg (158 lb 6.4 oz)               Reviewed and updated as needed this visit by Provider         Review of Systems   ROS COMP: CONSTITUTIONAL: NEGATIVE for fever, chills, change in weight  INTEGUMENTARY/SKIN: NEGATIVE for worrisome rashes, moles or lesions  EYES: NEGATIVE for vision changes or irritation  ENT/MOUTH: NEGATIVE for ear, mouth and throat problems  RESP: NEGATIVE for significant cough or SOB  CV: NEGATIVE for chest pain, palpitations or peripheral edema  GI: NEGATIVE for nausea, abdominal pain, heartburn, or change in bowel habits  : NEGATIVE for frequency, dysuria, or hematuria  MUSCULOSKELETAL: NEGATIVE for significant arthralgias or myalgia  NEURO: NEGATIVE for weakness, dizziness or paresthesias  ENDOCRINE: NEGATIVE for temperature intolerance, skin/hair changes  HEME: NEGATIVE for bleeding problems  PSYCHIATRIC: NEGATIVE for changes in mood or affect      Objective  Physical Exam   /83 (BP Location: Right arm, Patient Position: Sitting, Cuff Size: Adult Regular)   Pulse 76   Temp 97.9  F (36.6  C) (Oral)   Resp 18   Ht 1.626 m (5' 4\")   Wt 71.8 kg (158 lb 6.4 oz)   LMP  (LMP Unknown)   SpO2 98%   Breastfeeding? No   BMI 27.19 kg/m    GENERAL: healthy, alert and no distress  EYES: Eyes grossly normal to inspection, PERRL and conjunctivae and sclerae normal  HENT: ear canals and TM's normal, nose and mouth without ulcers or lesions  NECK: no adenopathy, no asymmetry, masses, or scars and thyroid normal to palpation  RESP: lungs clear to auscultation - no rales, rhonchi or wheezes  CV: regular rate and rhythm, normal S1 S2, no S3 or S4, no murmur, click or rub, no peripheral edema and peripheral " "pulses strong  ABDOMEN: soft, nontender, no hepatosplenomegaly, no masses and bowel sounds normal  MS: no gross musculoskeletal defects noted, no edema  SKIN: no suspicious lesions or rashes  NEURO: Normal strength and tone, mentation intact and speech normal  PSYCH: mentation appears normal, affect normal/bright    Diagnostic Test Results:  none         Assessment & Plan     1. Herpes zoster without complication    - valACYclovir (VALTREX) 1000 mg tablet; Take 1 tablet (1,000 mg) by mouth 2 times daily  Dispense: 14 tablet; Refill: 2    2. Hypothyroidism, unspecified type    - TSH; Standing  - T4, free; Standing    3. Need for shingles vaccine    - ZOSTER VACCINE RECOMBINANT ADJUVANTED IM NJX; Standing     BMI:   Estimated body mass index is 27.19 kg/m  as calculated from the following:    Height as of this encounter: 1.626 m (5' 4\").    Weight as of this encounter: 71.8 kg (158 lb 6.4 oz).   Weight management plan: dietary changes.        FUTURE APPOINTMENTS:       - Follow-up visit in 3 months or earlier as needed.    Tre Lockett MD  [facility]      "

## 2019-07-16 ENCOUNTER — OFFICE VISIT (OUTPATIENT)
Dept: RHEUMATOLOGY | Facility: CLINIC | Age: 84
End: 2019-07-16
Payer: MEDICARE

## 2019-07-16 VITALS
WEIGHT: 158.8 LBS | DIASTOLIC BLOOD PRESSURE: 76 MMHG | HEART RATE: 77 BPM | SYSTOLIC BLOOD PRESSURE: 134 MMHG | OXYGEN SATURATION: 99 % | HEIGHT: 64 IN | BODY MASS INDEX: 27.11 KG/M2

## 2019-07-16 DIAGNOSIS — M05.79 RHEUMATOID ARTHRITIS INVOLVING MULTIPLE SITES WITH POSITIVE RHEUMATOID FACTOR (H): Primary | ICD-10-CM

## 2019-07-16 DIAGNOSIS — Z79.899 HIGH RISK MEDICATIONS (NOT ANTICOAGULANTS) LONG-TERM USE: ICD-10-CM

## 2019-07-16 PROCEDURE — 99213 OFFICE O/P EST LOW 20 MIN: CPT | Performed by: INTERNAL MEDICINE

## 2019-07-16 RX ORDER — AZATHIOPRINE 50 MG/1
50 TABLET ORAL DAILY
Qty: 90 TABLET | Refills: 2 | Status: SHIPPED | OUTPATIENT
Start: 2019-07-16 | End: 2019-11-05

## 2019-07-16 ASSESSMENT — MIFFLIN-ST. JEOR: SCORE: 1135.31

## 2019-07-16 NOTE — PATIENT INSTRUCTIONS
Rheumatology    Dr. Mario Davenport         [facility]   (Monday)  [address]  [place] [address]       [facility]   (Tuesday)  [address]  [place] [address]    [facility] [address]   (Wed., Thurs., and Friday)  [address] [place] [address]    Phone number: [phone]  Thank you for choosing [facility].  Nicole Will CMA

## 2019-07-16 NOTE — PROGRESS NOTES
"Rheumatology Clinic Visit      Linda Spicer MRN# [id number]   YOB: 1931 Age: 88 year old      Date of visit: 7/16/19   PCP: Dr. Tre Lockett  Pulmonology: Dr. Sandra Comer  Cardiology: Dr. Emeterio Loza  Dermatology: Dr. Andrews Knott at [facility] in [place]     Chief Complaint   Patient presents with:  Arthritis: RA. Patient is doing much better, does get tired but has shingles.    Assessment and Plan     1. Seropositive Nonerosive Rheumatoid Arthritis (RF 99, CCP 52): Previously treated with hydroxychloroquine 200 mg daily (stopped previously because of macular degeneration), methotrexate (leg cramps), Cimzia (stopped due to recurrent basal cell carcinoma and hx of heart failure). Has sulfa allergy. Leflunomide avoided because of ILD.  Currently on Orencia IV and azathioprine 50mg daily. Doing well today with regard to RA and therefore will maintain the current medication regimen.  TPMT 23.2 (normal 24-44) and considered \"intermediate activity\".  - Continue orencia 750mg IV every 4 weeks, administered  at the [facility]  - Continue azathioprine 50mg daily  - Labs every 3 months to be done with her Orencia infusions (every third infusion): CBC, Creatinine, Hepatic Panel    2. Bilateral shoulder impingement syndrome, history: Maintaining ROM with exercises at home. She was previously referred to PT but did not go. Not an issue today.     3. History of basal cell carcinoma: Reportedly with lesions removed in 2007, fall 2016 and fall 2017.    4. Heart failure: She is followed by cardiology.  Has a pacemaker, per patient.     5. 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  2013 chest CT with contrast performed at [facility] documents UIP pattern. Follows with Dr. Comer. 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      6. Bone Health: Managed by PCP / endocrinology. "     Ms. Spicer verbalized agreement with and understanding of the rational for the diagnosis and treatment plan.  All questions were answered to best of my ability and the patient's satisfaction. Ms. Spicer was advised to contact the clinic with any questions that may arise after the clinic visit.      Thank you for involving me in the care of the patient    Return to clinic: 6 months      HPI   Linda Spicer is a 88 year old female with a medical history significant for hypertension, heart failure, pulmonary fibrosis, audible bowel syndrome, degenerative disc disease of the lumbar spine status post laminectomy, chronic kidney disease, history of basal cell carcinoma, and rheumatoid arthritis who presents for follow-up of rheumatoid arthritis.    Today, she reports that she is doing well with regard to rheumatoid arthritis.  She does note that she had shingles that have nearly resolved; she has no open lesions at this time.  She has not had to hold Orencia because she says that the shingles resolved fairly quickly.  She reports having some pain at one point for a couple days and that has resolved.  She also feels much better ever since having a mitral clip done by her cardiologist.  Overall doing well, feeling much better than she did when she was last in clinic she says.      Denies fevers, chills, nausea, vomiting. No abdominal pain. No chest pain/pressure, palpitations.  Chronic SOB. No oral or nasal sores. No neck pain.   No photosensitivity or photophobia.   No eye pain or redness.     Tobacco: None  EtOH: rare  Drugs: none  Used to live in [place], [place] part time.    ROS   GEN: No fevers, chills, night sweats, or weight change  SKIN: See HPI  HEENT: No epistaxis. No oral or nasal ulcers.  CV: See HPI  PULM: See HPI  GI: No nausea, vomiting. No blood in stool. No abdominal pain.  : No blood in urine.  MSK: See HPI.  EXT: No LE swelling  PSYCH: Negative    Active Problem List     Patient Active Problem List    Diagnosis     Hypertension goal BP (blood pressure) < 150/90     Hypothyroidism     Seropositive rheumatoid arthritis (H)     Macular degeneration, left eye     Irritable bowel syndrome     Encounter for palliative care     Adjustment disorder with anxious mood     Mild anemia     DDD (degenerative disc disease), lumbar     CKD (chronic kidney disease) stage 3, GFR 30-59 ml/min (H)     History of blood transfusion     Aftercare following surgery     S/P lumbar laminectomy     High risk medication use     Age-related osteoporosis without current pathological fracture     Atrophic vaginitis     Fecal incontinence     Female stress incontinence     Impingement syndrome of both shoulders     UIP (usual interstitial pneumonitis) (H)     High risk medications (not anticoagulants) long-term use     Heart failure with preserved ejection fraction (H)     Congestive heart failure with preserved LV function, NYHA class 3 (H)     Other specified hypothyroidism     Rheumatoid arthritis involving multiple sites with positive rheumatoid factor (H)     Health Care Home     Sick sinus syndrome (H)     Cardiac pacemaker - Medtronic dual lead pacemaker - Not Dependent - MRI Safe     Immunosuppression (H)     ILD (interstitial lung disease) (H)     Heart failure with preserved ejection fraction, NYHA class I (H)     Atrial flutter (H)     Persistent atrial fibrillation (H)     CHF exacerbation (H)     Pre-syncope     (HFpEF) heart failure with preserved ejection fraction (H)     Mitral regurgitation     SOB (shortness of breath)     Mitral valve insufficiency, unspecified etiology     Abnormal findings on diagnostic imaging of cardiovascular system     Status post coronary angiogram     Dyspnea     RUSSELL (dyspnea on exertion)     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     Swelling of right lower extremity     Past Medical History     Past Medical History:   Diagnosis Date     Adjustment disorder with anxious mood 5/18/2015      Advanced directives, counseling/discussion 8/30/2012    Patient states has Advance Directive and will bring in a copy to clinic. 8/30/2012   [facility] Medical Assistant \       Anemia 9/25/2015     Basal cell cancer      CHF (congestive heart failure) (H) 9/18/2014     CKD (chronic kidney disease) stage 3, GFR 30-59 ml/min (H) 9/29/2015     DDD (degenerative disc disease), lumbar 9/25/2015     Diffuse idiopathic pulmonary fibrosis (H) 5/6/2013     Encounter for palliative care 5/18/2015     History of blood transfusion 9/29/2015     Hypertension goal BP (blood pressure) < 140/90 9/7/2012     Hypothyroid 9/7/2012     Irritable bowel syndrome 10/29/2013     Macular degeneration      Macular degeneration, 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     Nondisplaced spiral fracture of shaft of humerus      Osteoporosis 8/13/2013     Imo Update utility     RA (rheumatoid arthritis) (H) 5/7/2013     Rheumatic fever      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 2/20/2019     Shingles      Spinal stenosis of lumbar region with neurogenic claudication 9/14/2015     Past Surgical History     Past Surgical History:   Procedure Laterality Date     ANESTHESIA CARDIOVERSION N/A 9/14/2018    Procedure: ANESTHESIA CARDIOVERSION;;  Surgeon: GENERIC ANESTHESIA PROVIDER;  Location: [facility] OR     ANESTHESIA CARDIOVERSION N/A 10/11/2018    Procedure: ANESTHESIA CARDIOVERSION;  Cardioversion;  Surgeon: GENERIC ANESTHESIA PROVIDER;  Location: [facility] OR     ANESTHESIA CARDIOVERSION N/A 10/16/2018    Procedure: Anesthesia Cardioversion ;  Surgeon: GENERIC ANESTHESIA PROVIDER;  Location: [facility] OR     APPENDECTOMY       BIOPSY      hemorrhoidectomy     CV HEART CATHETERIZATION WITH POSSIBLE INTERVENTION N/A 1/31/2019    Procedure: CORS,LHC,RHC-YOLANDA BEFORE CATH APPOINTMENT;  Surgeon: Avila Watson MD;  Location:  HEART CARDIAC CATH LAB     CV MITRACLIP N/A 2/11/2019    Procedure: Mitraclip Procedure;  Surgeon: Avila Watson MD;  Location:  OR     ENT  SURGERY      tonsillectomy     GYN SURGERY      3 D & C's     HYSTERECTOMY, PAP NO LONGER INDICATED       LAMINECTOMY LUMBAR ONE LEVEL N/A 10/13/2015    Procedure: LAMINECTOMY LUMBAR ONE LEVEL;  Surgeon: Fransico Toussaint MD;  Location: [facility] OR     Allergy     Allergies   Allergen Reactions     Cephalexin Diarrhea and GI Disturbance     Other reaction(s): GI Upset       Cephalexin Hcl Diarrhea     Gabapentin Other (See Comments)     Dizzsiness  Shaky, dizzy, dry mouth  Dizzsiness  Shaky,dry mouth       Methotrexate Other (See Comments)     Depleted Folic Acid  And caused severe leg cramps  Severe leg cramps     Naproxen GI Disturbance, Other (See Comments) and Nausea     Constipation. Tolerates ibuprofen.       Perfume      Sulfa Drugs Shortness Of Breath     Throat swelling     Alprazolam Other (See Comments)     Dizziness      Lactase Other (See Comments) and Unknown     Per pt - pt has lactose intolerance and cannot drink milk. Pt can tolerate other dairy products.      Levofloxacin GI Disturbance     Macrobid [Nitrofurantoin Anhydrous]      Possibly related to lung disease      Nitrofurantoin      Possibly related to lung disease      Seasonal Allergies      Ciprofloxacin Itching and Rash     Current Medication List     Current Outpatient Medications   Medication Sig     apixaban ANTICOAGULANT (ELIQUIS) 2.5 MG tablet Take 1 tablet (2.5 mg) by mouth 2 times daily Stop taking 2/9 (last dose evening of Feb 8, 2019) in preparation for MitraClip Procedure on Monday     azaTHIOprine (IMURAN) 50 MG tablet Take 1 tablet (50 mg) by mouth daily     COMPOUNDED NON-CONTROLLED SUBSTANCE (CMPD RX) - PHARMACY TO MIX COMPOUNDED MEDICATION Estriol 1mg/gram. Place 1 gram vaginally daily for 2 weeks. Then vaginally twice weekly     denosumab (PROLIA) 60 MG/ML SOLN injection Inject 1 mL (60 mg) Subcutaneous every 6 months     diphenhydrAMINE (BENADRYL) 25 MG tablet Take 25 mg by mouth as needed Patient takes 25-50mg 1-2 times a  day.     furosemide (LASIX) 20 MG tablet Take 1 tablet (20 mg) by mouth 2 times daily     hydrALAZINE (APRESOLINE) 10 MG tablet Take 1 tablet (10 mg) by mouth 3 times daily     isosorbide mononitrate (IMDUR) 60 MG 24 hr tablet Take 1 tablet (60 mg) by mouth daily     levothyroxine (SYNTHROID/LEVOTHROID) 75 MCG tablet TAKE 1 TABLET BY MOUTH EVERY DAY     order for DME Dispense TIESHA Curran     order for DME Equipment being ordered: Dispense baffle, for use with nebulizer.     order for DME Equipment being ordered: Nebulizer. Use with Albuterol.     order for DME Equipment being ordered: Dispense face mask.  Mrs. Spicer is immunosuppressed due to rheumatoid arthritis.     ranitidine (ZANTAC) 150 MG tablet Take 1 tablet (150 mg) by mouth At Bedtime     senna-docusate (SENOKOT-S/PERICOLACE) 8.6-50 MG tablet Take 1 tablet by mouth daily as needed for constipation     trimethoprim (TRIMPEX) 100 MG tablet Take 0.5 tablets (50 mg) by mouth daily     valACYclovir (VALTREX) 1000 mg tablet Take 1 tablet (1,000 mg) by mouth 2 times daily     albuterol (PROVENTIL) (2.5 MG/3ML) 0.083% neb solution Take 1 vial (2.5 mg) by nebulization every 6 hours as needed for shortness of breath / dyspnea or wheezing     carvedilol (COREG) 3.125 MG tablet Take 1 tablet (3.125 mg) by mouth 2 times daily (with meals)     nitroGLYcerin (NITROSTAT) 0.4 MG sublingual tablet Place 1 tablet (0.4 mg) under the tongue every 5 minutes as needed for chest pain     No current facility-administered medications for this visit.        Social History   See HPI    Family History     Family History   Problem Relation Age of Onset     Hypertension Mother      Psychotic Disorder Father      Diabetes Son      Diabetes Daughter      Blood Disease Daughter      Physical Exam     Temp Readings from Last 3 Encounters:   07/12/19 97.9  F (36.6  C) (Oral)   07/05/19 97.9  F (36.6  C) (Oral)   07/05/19 97.7  F (36.5  C) (Oral)     BP Readings from Last 5 Encounters:  "  07/16/19 134/76   07/12/19 137/83   07/05/19 169/80   07/05/19 125/74   06/20/19 109/68     Pulse Readings from Last 1 Encounters:   07/16/19 77     Resp Readings from Last 1 Encounters:   07/12/19 18     Estimated body mass index is 27.26 kg/m  as calculated from the following:    Height as of this encounter: 1.626 m (5' 4\").    Weight as of this encounter: 72 kg (158 lb 12.8 oz).    GEN: NAD  HEENT: MMM. No oral lesions. Anicteric, noninjected sclera  CV: S1, S2. RRR. No m/r/g.  PULM: CTA bilaterally. No w/c.  No increased work of breathing.  MSK: Hands, wrists, elbows, and shoulders without swelling or tenderness to palpation. Hips nontender to palpation. Bilateral knees with mild medial joint line tenderness but no effusion or increased warmth.  Ankles and MTPs without swelling or tenderness to palpation.    NEURO: UE and LE strengths 5/5 and equal bilaterally.   SKIN: No rash  PSYCH: Alert. Appropriate.    Labs / Imaging (select studies)   RF/CCP  Recent Labs   Lab Test 04/05/16  1036   CCPIGG 52*   RHF 99*     CBC  Recent Labs   Lab Test 07/05/19  1618 06/20/19  0919 03/13/19  0918  01/15/19  1936   WBC 5.8 5.0 6.4   < > 6.7   RBC 3.95 3.83 3.35*   < > 3.88   HGB 12.3 12.2 10.9*   < > 13.0   HCT 39.0 37.0 34.0*   < > 39.1   MCV 99 97 102*   < > 101*   RDW 14.5 14.1 14.0   < > 13.0    217 243   < > 283   MCH 31.1 31.9 32.5   < > 33.5*   MCHC 31.5 33.0 32.1   < > 33.2   NEUTROPHIL 62.8 66.8  --   --  65.1   LYMPH 19.7 16.3  --   --  23.4   MONOCYTE 13.7 12.5  --   --  9.4   EOSINOPHIL 2.9 3.4  --   --  1.6   BASOPHIL 0.7 0.6  --   --  0.4   ANEU 3.7 3.3  --   --  4.4   ALYM 1.2 0.8  --   --  1.6   YEHUDA 0.8 0.6  --   --  0.6   AEOS 0.2 0.2  --   --  0.1   ABAS 0.0 0.0  --   --  0.0    < > = values in this interval not displayed.     CMP  Recent Labs   Lab Test 07/05/19  1827 07/05/19  1618 06/20/19  0919 03/26/19  0942 03/13/19  0918  02/11/19  1225 02/11/19  0625   NA  --  136  --  140 141   < > 142 141 "   POTASSIUM 4.1 5.5*  --  3.9 3.9   < > 3.8 3.8   CHLORIDE  --  104  --  106 104   < > 107 103   CO2  --  24  --  29 30   < > 26 28   ANIONGAP  --  7  --  5 7   < > 9 10   GLC  --  87  --  109* 87   < > 106* 85   BUN  --  27  --  44* 42*   < > 44* 46*   CR  --  1.15* 1.71* 1.62* 1.76*   < > 1.71* 1.85*   GFRESTIMATED  --  42* 26* 28* 25*   < > 26* 24*   GFRESTBLACK  --  49* 30* 33* 29*   < > 31* 28*   FATOUMATA  --  8.5 9.3 8.0* 9.2   < > 8.3* 9.1   BILITOTAL  --   --  0.4  --   --   --  0.4 0.5   ALBUMIN  --   --  3.6  --   --   --  3.1* 3.6   PROTTOTAL  --   --  7.5  --   --   --  6.7* 7.8   ALKPHOS  --   --  67  --   --   --  47 58   AST  --   --  44  --   --   --  20 17   ALT  --   --  43  --   --   --  16 17    < > = values in this interval not displayed.     Calcium/VitaminD  Recent Labs   Lab Test 07/05/19  1618 06/20/19  0919 03/26/19  0942   FATOUMATA 8.5 9.3 8.0*     ESR/CRP  Recent Labs   Lab Test 01/17/19  0531 01/16/19  0525 12/01/18  0759 05/02/18  1050 02/07/18  1008   SED 25  --  28  --  34*   CRP  --  <2.9 <2.9 <2.9 3.7     Hepatitis B  Recent Labs   Lab Test 04/05/16  1036   HBCAB Nonreactive   HEPBANG Nonreactive     Hepatitis C  Recent Labs   Lab Test 04/05/16  1036   HCVAB Nonreactive   Assay performance characteristics have not been established for newborns,   infants, and children         Tuberculosis Screening  Recent Labs   Lab Test 02/21/17  1148 04/05/16  1037   TBRSLT Negative Negative   TBAGN 0.19 0.03     HIV Screening  Recent Labs   Lab Test 04/05/16  1036   HIAGAB Nonreactive   HIV-1 p24 Ag & HIV-1/HIV-2 Ab Not Detected       Immunization History     Immunization History   Administered Date(s) Administered     Influenza (High Dose) 3 valent vaccine 09/07/2012, 10/08/2013, 09/26/2014, 09/30/2015, 09/21/2016, 10/13/2017, 09/27/2018     Influenza (IIV3) PF 08/29/2011     Mantoux Tuberculin Skin Test 12/04/2018     Pneumo Conj 13-V (2010&after) 04/05/2016     Pneumococcal 23 valent 05/01/2001,  10/04/2010     TD (ADULT, 7+) 07/01/2008     TDAP Vaccine (Boostrix) 09/11/2017          Chart documentation done in part with Dragon Voice recognition Software. Although reviewed after completion, some word and grammatical error may remain.    Mario Davenport MD

## 2019-07-16 NOTE — NURSING NOTE
RAPID3 (0-30) Cumulative Score  7.7          RAPID3 Weighted Score (divide #4 by 3 and that is the weighted score)  2.6     Nicole Will [facility]  7/16/2019 10:18 AM

## 2019-07-18 ENCOUNTER — INFUSION THERAPY VISIT (OUTPATIENT)
Dept: INFUSION THERAPY | Facility: CLINIC | Age: 84
End: 2019-07-18
Payer: MEDICARE

## 2019-07-18 ENCOUNTER — DOCUMENTATION ONLY (OUTPATIENT)
Dept: SPIRITUAL SERVICES | Facility: CLINIC | Age: 84
End: 2019-07-18

## 2019-07-18 VITALS
DIASTOLIC BLOOD PRESSURE: 58 MMHG | BODY MASS INDEX: 27.21 KG/M2 | SYSTOLIC BLOOD PRESSURE: 116 MMHG | RESPIRATION RATE: 16 BRPM | TEMPERATURE: 98 F | WEIGHT: 158.5 LBS | OXYGEN SATURATION: 98 % | HEART RATE: 66 BPM

## 2019-07-18 DIAGNOSIS — M05.79 RHEUMATOID ARTHRITIS INVOLVING MULTIPLE SITES WITH POSITIVE RHEUMATOID FACTOR (H): Primary | ICD-10-CM

## 2019-07-18 PROCEDURE — 99207 ZZC NO CHARGE LOS: CPT

## 2019-07-18 PROCEDURE — 96365 THER/PROPH/DIAG IV INF INIT: CPT | Performed by: NURSE PRACTITIONER

## 2019-07-18 ASSESSMENT — PAIN SCALES - GENERAL: PAINLEVEL: NO PAIN (0)

## 2019-07-18 NOTE — TELEPHONE ENCOUNTER
SPIRITUAL HEALTH SERVICES Progress Note  [facility]    Visited Linda Spicer and her son Leonardo  for ongoing emotional/spiritual support.  Patient requested a SH encounter today.        Illness Narrative - Patient is here for her normal scheduled infusion.       Distress - Patient has been doing well but did suffer another bout of shingles recently.       Coping - Patient asked for Confucianist communion.  Offered communion and prayed with her. Reflective conversation with her son about his Cheondoism debbie traditions.       Meaning-Making - Not discussed today.       Plan - Patient knows that she can request a SH encounter as needed.     Adrianne Gomez  Associate   Pager

## 2019-07-18 NOTE — PROGRESS NOTES
Infusion Nursing Note:  Linda Spicer presents today for Orencia.    Patient seen by provider today: No   present during visit today: Not Applicable.    Note: N/A.    Intravenous Access:  Peripheral IV placed.    Treatment Conditions:  Biological Infusion Checklist:  ~~~ NOTE: If the patient answers yes to any of the questions below, hold the infusion and contact ordering provider or on-call provider.    1. Have you recently had an elevated temperature, fever, chills, productive cough, coughing for 3 weeks or longer or hemoptysis, abnormal vital signs, night sweats,  chest pain or have you noticed a decrease in your appetite, unexplained weight loss or fatigue? No  2. Do you have any open wounds or new incisions? No , recently had shingles, healed completely and MD aware.  3. Do you have any recent or upcoming hospitalizations, surgeries or dental procedures? No  4. Do you currently have or recently have had any signs of illness or infection or are you on any antibiotics? No  5. Have you had any new, sudden or worsening abdominal pain? No  6. Have you or anyone in your household received a live vaccination in the past 4 weeks? Please note:  No live vaccines while on biologic/chemotherapy until 6 months after the last treatment.  Patient can receive the flu vaccine (shot only) and the pneumovax.  It is optimal for the patient to get these vaccines mid cycle, but they can be given at any time as long as it is not on the day of the infusion. No  7. Have you recently been diagnosed with any new nervous system diseases (ie. Multiple sclerosis, Guillain [place], seizures, neurological changes) or cancer diagnosis? No  8. Are you on any form of radiation or chemotherapy? No  9. Are you pregnant or breast feeding or do you have plans of pregnancy in the future? No  10. Have you been having any signs of worsening depression or suicidal ideations?  (benlysta only) No  11. Have there been any other new onset medical  symptoms? No      Post Infusion Assessment:  Patient tolerated infusion without incident.  Site patent and intact, free from redness, edema or discomfort.  No evidence of extravasations.  Access discontinued per protocol.       Discharge Plan:   Patient will return 8/15/19 for next appointment.   Patient discharged in stable condition accompanied by: self.  Departure Mode: Ambulatory.    Trinity Varela RN

## 2019-08-15 ENCOUNTER — INFUSION THERAPY VISIT (OUTPATIENT)
Dept: INFUSION THERAPY | Facility: CLINIC | Age: 84
End: 2019-08-15
Payer: MEDICARE

## 2019-08-15 VITALS
BODY MASS INDEX: 27.5 KG/M2 | TEMPERATURE: 97.7 F | SYSTOLIC BLOOD PRESSURE: 128 MMHG | DIASTOLIC BLOOD PRESSURE: 55 MMHG | OXYGEN SATURATION: 98 % | HEART RATE: 76 BPM | WEIGHT: 160.2 LBS

## 2019-08-15 DIAGNOSIS — M05.79 RHEUMATOID ARTHRITIS INVOLVING MULTIPLE SITES WITH POSITIVE RHEUMATOID FACTOR (H): Primary | ICD-10-CM

## 2019-08-15 PROCEDURE — 99207 ZZC NO CHARGE NURSE ONLY: CPT

## 2019-08-15 PROCEDURE — 96365 THER/PROPH/DIAG IV INF INIT: CPT | Performed by: INTERNAL MEDICINE

## 2019-08-15 ASSESSMENT — PAIN SCALES - GENERAL: PAINLEVEL: MODERATE PAIN (5)

## 2019-08-15 NOTE — PROGRESS NOTES
Infusion Nursing Note:  Linda Spicer presents today for Orencia.    Patient seen by provider today: No   present during visit today: Not Applicable.    Note:Pt denies any reason to hold therapy today - states she has been tolerating her infusions.    Intravenous Access:  Peripheral IV placed.    Treatment Conditions:  Biological Infusion Checklist:  ~~~ NOTE: If the patient answers yes to any of the questions below, hold the infusion and contact ordering provider or on-call provider.    1. Have you recently had an elevated temperature, fever, chills, productive cough, coughing for 3 weeks or longer or hemoptysis, abnormal vital signs, night sweats,  chest pain or have you noticed a decrease in your appetite, unexplained weight loss or fatigue? No  2. Do you have any open wounds or new incisions? No  3. Do you have any recent or upcoming hospitalizations, surgeries or dental procedures? No  4. Do you currently have or recently have had any signs of illness or infection or are you on any antibiotics? No  5. Have you had any new, sudden or worsening abdominal pain? No  6. Have you or anyone in your household received a live vaccination in the past 4 weeks? Please note:  No live vaccines while on biologic/chemotherapy until 6 months after the last treatment.  Patient can receive the flu vaccine (shot only) and the pneumovax.  It is optimal for the patient to get these vaccines mid cycle, but they can be given at any time as long as it is not on the day of the infusion. No  7. Have you recently been diagnosed with any new nervous system diseases (ie. Multiple sclerosis, Guillain [place], seizures, neurological changes) or cancer diagnosis? No  8. Are you on any form of radiation or chemotherapy? No  9. Are you pregnant or breast feeding or do you have plans of pregnancy in the future? No  10. Have you been having any signs of worsening depression or suicidal ideations?  (benlysta only) No  11. Have there been  any other new onset medical symptoms? No      Post Infusion Assessment:  Patient tolerated infusion without incident.  Blood return noted pre and post infusion.  Site patent and intact, free from redness, edema or discomfort.  No evidence of extravasations.  Access discontinued per protocol.  Biologic Infusion Post Education: Call the triage nurse at your clinic or seek medical attention if you have chills and/or temperature greater than or equal to 100.5, uncontrolled nausea/vomiting, diarrhea, constipation, dizziness, shortness of breath, chest pain, heart palpitations, weakness or any other new or concerning symptoms, questions or concerns.  You cannot have any live virus vaccines prior to or during treatment or up to 6 months post infusion.  If you have an upcoming surgery, medical procedure or dental procedure during treatment, this should be discussed with your ordering physician and your surgeon/dentist.  If you are having any concerning symptom, if you are unsure if you should get your next infusion or wish to speak to a provider before your next infusion, please call your care coordinator or triage nurse at your clinic to notify them so we can adequately serve you.       Discharge Plan:   Return on 09/12/19 for [facility].  Discharge instructions reviewed with: Patient.  Patient and/or family verbalized understanding of discharge instructions and all questions answered.  Patient discharged in stable condition accompanied by: self and .  Departure Mode: Ambulatory.    Ute Khan RN

## 2019-08-22 ENCOUNTER — ANCILLARY PROCEDURE (OUTPATIENT)
Dept: CARDIOLOGY | Facility: CLINIC | Age: 84
End: 2019-08-22
Attending: INTERNAL MEDICINE
Payer: MEDICARE

## 2019-08-22 DIAGNOSIS — I49.5 SSS (SICK SINUS SYNDROME) (H): ICD-10-CM

## 2019-08-22 PROCEDURE — 93296 REM INTERROG EVL PM/IDS: CPT | Mod: ZF

## 2019-08-22 PROCEDURE — 93294 REM INTERROG EVL PM/LDLS PM: CPT | Performed by: INTERNAL MEDICINE

## 2019-08-29 ENCOUNTER — TELEPHONE (OUTPATIENT)
Dept: CARDIOLOGY | Facility: CLINIC | Age: 84
End: 2019-08-29

## 2019-08-29 DIAGNOSIS — I48.92 ATRIAL FLUTTER, PAROXYSMAL (H): ICD-10-CM

## 2019-08-29 NOTE — TELEPHONE ENCOUNTER
Requested Prescriptions   Pending Prescriptions Disp Refills     apixaban ANTICOAGULANT (ELIQUIS) 2.5 MG tablet 60 tablet 9     Sig: Take 1 tablet (2.5 mg) by mouth 2 times daily Stop taking 2/9 (last dose evening of Feb 8, 2019) in preparation for MitraClip Procedure on Monday       There is no refill protocol information for this order        Called and spoke to patient. She has recently run out of the free supply of Eliquis and needs a refill of this.   Last appt : 3/13/19    BRANDI Berger

## 2019-08-29 NOTE — TELEPHONE ENCOUNTER
Linda called regarding her Eliquis. Patient states that she needs a new prescription sent to [facility]. Patient would like to discuss this with Bryn Contreras RN   Pulmonary/CORE Care Coordinator  [facility]

## 2019-08-30 ENCOUNTER — TELEPHONE (OUTPATIENT)
Dept: CARDIOLOGY | Facility: CLINIC | Age: 84
End: 2019-08-30

## 2019-08-30 DIAGNOSIS — I50.33 ACUTE ON CHRONIC DIASTOLIC HEART FAILURE (H): Primary | ICD-10-CM

## 2019-08-30 NOTE — TELEPHONE ENCOUNTER
----- Message from Lizett Zapata RN sent at 8/30/2019 12:22 PM CDT -----  Regarding: lab appt  Hi -   Linda has an appt with Karla on 9/9, can you call her to arrange labs the week prior on Thursday or Friday?   Thanks!  Blanca

## 2019-09-03 ENCOUNTER — TELEPHONE (OUTPATIENT)
Dept: UROLOGY | Facility: CLINIC | Age: 84
End: 2019-09-03

## 2019-09-03 ENCOUNTER — TELEPHONE (OUTPATIENT)
Dept: FAMILY MEDICINE | Facility: CLINIC | Age: 84
End: 2019-09-03

## 2019-09-03 DIAGNOSIS — N30.00 ACUTE CYSTITIS WITHOUT HEMATURIA: ICD-10-CM

## 2019-09-03 DIAGNOSIS — R30.0 DYSURIA: Primary | ICD-10-CM

## 2019-09-03 DIAGNOSIS — R30.0 DYSURIA: ICD-10-CM

## 2019-09-03 LAB
ALBUMIN UR-MCNC: NEGATIVE MG/DL
APPEARANCE UR: ABNORMAL
BACTERIA #/AREA URNS HPF: ABNORMAL /HPF
BILIRUB UR QL STRIP: NEGATIVE
COLOR UR AUTO: YELLOW
GLUCOSE UR STRIP-MCNC: NEGATIVE MG/DL
HGB UR QL STRIP: NEGATIVE
KETONES UR STRIP-MCNC: NEGATIVE MG/DL
LEUKOCYTE ESTERASE UR QL STRIP: ABNORMAL
NITRATE UR QL: NEGATIVE
NON-SQ EPI CELLS #/AREA URNS LPF: ABNORMAL /LPF
PH UR STRIP: 6.5 PH (ref 5–7)
RBC #/AREA URNS AUTO: ABNORMAL /HPF
SOURCE: ABNORMAL
SP GR UR STRIP: 1.01 (ref 1–1.03)
UROBILINOGEN UR STRIP-ACNC: 0.2 EU/DL (ref 0.2–1)
WBC #/AREA URNS AUTO: ABNORMAL /HPF

## 2019-09-03 PROCEDURE — 87086 URINE CULTURE/COLONY COUNT: CPT | Performed by: PHYSICIAN ASSISTANT

## 2019-09-03 PROCEDURE — 81001 URINALYSIS AUTO W/SCOPE: CPT | Performed by: PHYSICIAN ASSISTANT

## 2019-09-03 PROCEDURE — 87088 URINE BACTERIA CULTURE: CPT | Performed by: PHYSICIAN ASSISTANT

## 2019-09-03 PROCEDURE — 87186 SC STD MICRODIL/AGAR DIL: CPT | Performed by: PHYSICIAN ASSISTANT

## 2019-09-03 NOTE — TELEPHONE ENCOUNTER
Health Call Center    Phone Message    May a detailed message be left on voicemail: yes    Reason for Call: Xi stated patient has a possible UTI. Patient request an order for urine test to be collected at [facility]. Please advise.      Action Taken: Message routed to:  Adult Clinics: Urology p [id number]

## 2019-09-03 NOTE — TELEPHONE ENCOUNTER
Attempted to reach Xi without answer on mobile number listed. Awaiting call back. Upon review of chart patient is experiencing burning with urination last two nights and has hx of UTI. Stopped prophylactic Trimethoprim four months ago. Order placed for UA/UC per protocol since last seen by Asia Steven PA-C 2/2019. Awaiting call back to advise that orders placed in chart and that patient can go to any [place] pharmacy. Patient has multiple medication allergies listed and has hx of CKD stage three which will pose challenge when results are in for treatment. Kelley Chowdhury RN

## 2019-09-03 NOTE — TELEPHONE ENCOUNTER
.Reason for call:  Patient reporting a symptom    Symptom or request: burning with urination - some frequency      Duration (how long have symptoms been present): the past two nights    Have you been treated for this before? Yes    Additional comments: requesting orders to drop off a specimen/treatment - [facility] Aura Bran    Phone Number patient can be reached at:  Home number on file [phone] (home)    Best Time:  anytime      Can we leave a detailed message on this number:  YES    Call taken on 9/3/2019 at 11:31 AM by Radha Martins

## 2019-09-04 ENCOUNTER — TELEPHONE (OUTPATIENT)
Dept: CARDIOLOGY | Facility: CLINIC | Age: 84
End: 2019-09-04

## 2019-09-04 NOTE — TELEPHONE ENCOUNTER
Called spoke to Xi she is notified regarding the appointment at 10a.  Elmo Jimenez,  [facility]    This writer put patient at the 1:40p slot but to arrive at 10a per Dr. Lockett.  Elmo Jimenez,  [facility]

## 2019-09-05 ENCOUNTER — OFFICE VISIT (OUTPATIENT)
Dept: FAMILY MEDICINE | Facility: CLINIC | Age: 84
End: 2019-09-05
Payer: MEDICARE

## 2019-09-05 ENCOUNTER — TELEPHONE (OUTPATIENT)
Dept: FAMILY MEDICINE | Facility: CLINIC | Age: 84
End: 2019-09-05

## 2019-09-05 VITALS
HEART RATE: 79 BPM | DIASTOLIC BLOOD PRESSURE: 70 MMHG | SYSTOLIC BLOOD PRESSURE: 114 MMHG | BODY MASS INDEX: 27.31 KG/M2 | OXYGEN SATURATION: 98 % | HEIGHT: 64 IN | TEMPERATURE: 98.2 F | WEIGHT: 160 LBS

## 2019-09-05 DIAGNOSIS — I50.33 ACUTE ON CHRONIC DIASTOLIC HEART FAILURE (H): Primary | ICD-10-CM

## 2019-09-05 DIAGNOSIS — I50.33 ACUTE ON CHRONIC DIASTOLIC HEART FAILURE (H): ICD-10-CM

## 2019-09-05 DIAGNOSIS — B95.2 URINARY TRACT INFECTION DUE TO ENTEROCOCCUS: Primary | ICD-10-CM

## 2019-09-05 DIAGNOSIS — B95.2 ENTEROCOCCUS UTI: Primary | ICD-10-CM

## 2019-09-05 DIAGNOSIS — N39.0 URINARY TRACT INFECTION DUE TO ENTEROCOCCUS: Primary | ICD-10-CM

## 2019-09-05 DIAGNOSIS — N39.0 ENTEROCOCCUS UTI: Primary | ICD-10-CM

## 2019-09-05 DIAGNOSIS — E78.5 HYPERLIPIDEMIA LDL GOAL <70: ICD-10-CM

## 2019-09-05 LAB
ALBUMIN UR-MCNC: NEGATIVE MG/DL
ANION GAP SERPL CALCULATED.3IONS-SCNC: 7 MMOL/L (ref 3–14)
APPEARANCE UR: ABNORMAL
BACTERIA #/AREA URNS HPF: ABNORMAL /HPF
BACTERIA SPEC CULT: ABNORMAL
BACTERIA SPEC CULT: ABNORMAL
BILIRUB UR QL STRIP: NEGATIVE
BUN SERPL-MCNC: 38 MG/DL (ref 7–30)
CALCIUM SERPL-MCNC: 8.7 MG/DL (ref 8.5–10.1)
CHLORIDE SERPL-SCNC: 106 MMOL/L (ref 94–109)
CHOLEST SERPL-MCNC: 196 MG/DL
CO2 SERPL-SCNC: 28 MMOL/L (ref 20–32)
COLOR UR AUTO: YELLOW
CREAT SERPL-MCNC: 1.58 MG/DL (ref 0.52–1.04)
GFR SERPL CREATININE-BSD FRML MDRD: 29 ML/MIN/{1.73_M2}
GLUCOSE SERPL-MCNC: 115 MG/DL (ref 70–99)
GLUCOSE UR STRIP-MCNC: NEGATIVE MG/DL
HDLC SERPL-MCNC: 52 MG/DL
HGB UR QL STRIP: NEGATIVE
KETONES UR STRIP-MCNC: NEGATIVE MG/DL
LDLC SERPL CALC-MCNC: 120 MG/DL
LEUKOCYTE ESTERASE UR QL STRIP: ABNORMAL
NITRATE UR QL: NEGATIVE
NON-SQ EPI CELLS #/AREA URNS LPF: ABNORMAL /LPF
NONHDLC SERPL-MCNC: 144 MG/DL
PH UR STRIP: 7 PH (ref 5–7)
POTASSIUM SERPL-SCNC: 4.7 MMOL/L (ref 3.4–5.3)
RBC #/AREA URNS AUTO: ABNORMAL /HPF
SODIUM SERPL-SCNC: 141 MMOL/L (ref 133–144)
SOURCE: ABNORMAL
SP GR UR STRIP: <=1.005 (ref 1–1.03)
SPECIMEN SOURCE: ABNORMAL
TRIGL SERPL-MCNC: 120 MG/DL
UROBILINOGEN UR STRIP-ACNC: 0.2 EU/DL (ref 0.2–1)
WBC #/AREA URNS AUTO: >100 /HPF

## 2019-09-05 PROCEDURE — 87186 SC STD MICRODIL/AGAR DIL: CPT | Performed by: INTERNAL MEDICINE

## 2019-09-05 PROCEDURE — 87088 URINE BACTERIA CULTURE: CPT | Performed by: INTERNAL MEDICINE

## 2019-09-05 PROCEDURE — 81001 URINALYSIS AUTO W/SCOPE: CPT | Performed by: INTERNAL MEDICINE

## 2019-09-05 PROCEDURE — 87086 URINE CULTURE/COLONY COUNT: CPT | Performed by: INTERNAL MEDICINE

## 2019-09-05 PROCEDURE — 36415 COLL VENOUS BLD VENIPUNCTURE: CPT | Performed by: INTERNAL MEDICINE

## 2019-09-05 PROCEDURE — 80061 LIPID PANEL: CPT | Performed by: INTERNAL MEDICINE

## 2019-09-05 PROCEDURE — 80048 BASIC METABOLIC PNL TOTAL CA: CPT | Performed by: NURSE PRACTITIONER

## 2019-09-05 PROCEDURE — 99213 OFFICE O/P EST LOW 20 MIN: CPT | Performed by: INTERNAL MEDICINE

## 2019-09-05 RX ORDER — DOXYCYCLINE 100 MG/1
100 CAPSULE ORAL 2 TIMES DAILY
Qty: 28 CAPSULE | Refills: 1 | Status: ON HOLD | OUTPATIENT
Start: 2019-09-05 | End: 2019-10-01

## 2019-09-05 RX ORDER — FUROSEMIDE 20 MG
20 TABLET ORAL DAILY
Qty: 270 TABLET | Refills: 3 | Status: SHIPPED | OUTPATIENT
Start: 2019-09-05 | End: 2019-09-09

## 2019-09-05 RX ORDER — DOXYCYCLINE 100 MG/1
100 CAPSULE ORAL 2 TIMES DAILY
Qty: 28 CAPSULE | Refills: 1 | Status: SHIPPED | OUTPATIENT
Start: 2019-09-05 | End: 2019-09-17

## 2019-09-05 ASSESSMENT — MIFFLIN-ST. JEOR: SCORE: 1140.76

## 2019-09-05 ASSESSMENT — PAIN SCALES - GENERAL: PAINLEVEL: NO PAIN (0)

## 2019-09-05 NOTE — PATIENT INSTRUCTIONS
At [facility], we strive to deliver an exceptional experience to you, every time we see you.  If you receive a survey in the mail, please send us back your thoughts. We really do value your feedback.    Your care team:                            Family Medicine Internal Medicine   MD Tre Hicks MD Shantel Branch-Fleming, MD Katya Georgiev PA-C Megan Hill, APRN KAVEH Sharma MD Pediatrics   Benja Lancaster, ABY Caballero, MD Mariann Chaney APRN CNP   MD Maritza Condon MD Deborah Mielke, MD Nataliia Sanchez, APRN [facility]      Clinic hours: Monday - Thursday 7 am-7 pm; Fridays 7 am-5 pm.   Urgent care: Monday - Friday 11 am-9 pm; Saturday and Sunday 9 am-5 pm.  Pharmacy : Monday -Thursday 8 am-8 pm; Friday 8 am-6 pm; Saturday and Sunday 9 am-5 pm.     Clinic: [phone]   Pharmacy: [phone]

## 2019-09-05 NOTE — TELEPHONE ENCOUNTER
Called pharmacy regarding message below. Pharmacist stated that most recent Rx was received and patient had picked up medication.    KAI Cook MA

## 2019-09-05 NOTE — TELEPHONE ENCOUNTER
Please see details below.     Order History   Outpatient   Date/Time Action Taken User Additional Information         19 1335 Sign VocalTre MD    Outpatient Medication Detail      Disp Refills Start End ROGERS   doxycycline monohydrate (MONODOX) 100 MG capsule 28 capsule 1 2019 --   Sig - Route: Take 1 capsule (100 mg) by mouth 2 times daily for 14 days - Oral   Sent to pharmacy as: doxycycline monohydrate (MONODOX) 100 MG capsule   Class: E-Prescribe   Order: [id number]   E-Prescribing Status: Receipt confirmed by pharmacy (2019  1:35 PM CDT)   Printout Tracking     External Result Report   Pharmacy     [id number] - [place], [place] - [address]   Associated Diagnoses     Urinary tract infection due to Enterococcus [N39.0, B95.2]

## 2019-09-05 NOTE — PROGRESS NOTES
Subjective     Linda Spicer is a 88 year old female who presents to clinic today for the following health issues:    HPI   URINARY TRACT SYMPTOMS  Onset: about 4-5 days ago    Description:   Painful urination (Dysuria): Yes burning            Frequency: no   Blood in urine (Hematuria): no   Delay in urine (Hesitency): YES    Intensity: moderate    Progression of Symptoms:  intermittent    Accompanying Signs & Symptoms:  Fever/chills: no   Flank pain no   Nausea and vomiting: no   Any vaginal symptoms: none  Abdominal/Pelvic Pain: no     History:   History of frequent UTI's: YES  History of kidney stones: no   Sexually Active: no   Possibility of pregnancy: No    Precipitating factors:   Postmenopausal and immunosuppressed (patient has rheumatoid arthritis).   Therapies Tried and outcome: Increase fluid intake. No antibiotics taken yet, but patient is taking Trimethoprim as prophylaxis.      Patient Active Problem List   Diagnosis     Hypertension goal BP (blood pressure) < 150/90     Hypothyroidism     Seropositive rheumatoid arthritis (H)     Macular degeneration, left eye     Irritable bowel syndrome     Encounter for palliative care     Adjustment disorder with anxious mood     Mild anemia     DDD (degenerative disc disease), lumbar     CKD (chronic kidney disease) stage 3, GFR 30-59 ml/min (H)     History of blood transfusion     Aftercare following surgery     S/P lumbar laminectomy     High risk medication use     Age-related osteoporosis without current pathological fracture     Atrophic vaginitis     Fecal incontinence     Female stress incontinence     Impingement syndrome of both shoulders     UIP (usual interstitial pneumonitis) (H)     High risk medications (not anticoagulants) long-term use     Heart failure with preserved ejection fraction (H)     Congestive heart failure with preserved LV function, NYHA class 3 (H)     Other specified hypothyroidism     Rheumatoid arthritis 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)     Health Care Home     Sick sinus syndrome (H)     Cardiac pacemaker - Medtronic dual lead pacemaker - Not Dependent - MRI Safe     Immunosuppression (H)     ILD (interstitial lung disease) (H)     Heart failure with preserved ejection fraction, NYHA class I (H)     Atrial flutter (H)     Persistent atrial fibrillation (H)     CHF exacerbation (H)     Pre-syncope     (HFpEF) heart failure with preserved ejection fraction (H)     Mitral regurgitation     SOB (shortness of breath)     Mitral valve insufficiency, unspecified etiology     Abnormal findings on diagnostic imaging of cardiovascular system     Status post coronary angiogram     Dyspnea     RUSSELL (dyspnea on exertion)     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     Swelling of right lower extremity     Past Surgical History:   Procedure Laterality Date     ANESTHESIA CARDIOVERSION N/A 9/14/2018    Procedure: ANESTHESIA CARDIOVERSION;;  Surgeon: GENERIC ANESTHESIA PROVIDER;  Location: [facility] OR     ANESTHESIA CARDIOVERSION N/A 10/11/2018    Procedure: ANESTHESIA CARDIOVERSION;  Cardioversion;  Surgeon: GENERIC ANESTHESIA PROVIDER;  Location: [facility] OR     ANESTHESIA CARDIOVERSION N/A 10/16/2018    Procedure: Anesthesia Cardioversion ;  Surgeon: GENERIC ANESTHESIA PROVIDER;  Location:  OR     APPENDECTOMY       BIOPSY      hemorrhoidectomy     CV HEART CATHETERIZATION WITH POSSIBLE INTERVENTION N/A 1/31/2019    Procedure: CORS,LHC,RHC-YOLANDA BEFORE CATH APPOINTMENT;  Surgeon: Avila Watson MD;  Location:  HEART CARDIAC CATH LAB     CV MITRACLIP N/A 2/11/2019    Procedure: Mitraclip Procedure;  Surgeon: Avila Watson MD;  [facility]: [facility]     ENT SURGERY      tonsillectomy     GYN SURGERY      3 D & C's     HYSTERECTOMY, PAP NO LONGER INDICATED       LAMINECTOMY LUMBAR ONE LEVEL N/A 10/13/2015    Procedure: LAMINECTOMY LUMBAR ONE LEVEL;  Surgeon: Fransico Toussaint MD;  Location:  OR       Social History      Tobacco Use     Smoking status: Never Smoker     Smokeless tobacco: Never Used   Substance Use Topics     Alcohol use: Yes     Comment: rare wine      Family History   Problem Relation Age of Onset     Hypertension Mother      Psychotic Disorder Father      Diabetes Son      Diabetes Daughter      Blood Disease Daughter          Allergies   Allergen Reactions     Cephalexin Diarrhea and GI Disturbance     Other reaction(s): GI Upset       Cephalexin Hcl Diarrhea     Gabapentin Other (See Comments)     Dizzsiness  Shaky, dizzy, dry mouth  Dizzsiness  Shaky,dry mouth       Methotrexate Other (See Comments)     Depleted Folic Acid  And caused severe leg cramps  Severe leg cramps     Naproxen GI Disturbance, Other (See Comments) and Nausea     Constipation. Tolerates ibuprofen.       Perfume      Sulfa Drugs Shortness Of Breath     Throat swelling     Alprazolam Other (See Comments)     Dizziness      Lactase Other (See Comments) and Unknown     Per pt - pt has lactose intolerance and cannot drink milk. Pt can tolerate other dairy products.      Levofloxacin GI Disturbance     Macrobid [Nitrofurantoin Anhydrous]      Possibly related to lung disease      Nitrofurantoin      Possibly related to lung disease      Seasonal Allergies      Ciprofloxacin Itching and Rash     Recent Labs   Lab Test 09/05/19  1007 07/05/19  1827 07/05/19  1618 06/20/19  0919  02/11/19  1225 02/11/19  0625  01/18/19  0447  11/28/18  1558  08/09/18  0958  08/30/17  1214   *  --   --   --   --   --   --   --   --   --   --   --  87  --  76   HDL 52  --   --   --   --   --   --   --   --   --   --   --  59  --  50   TRIG 120  --   --   --   --   --   --   --   --   --   --   --  51  --  101   ALT  --   --   --  43  --  16 17   < >  --    < > 21   < >  --    < >  --    CR 1.58*  --  1.15* 1.71*   < > 1.71* 1.85*   < > 1.55*   < > 1.69*   < > 1.38*   < >  --    GFRESTIMATED 29*  --  42* 26*   < > 26* 24*   < > 30*   < > 29*   < > 36*   < >  " --    GFRESTBLACK 33*  --  49* 30*   < > 31* 28*   < > 34*   < > 35*   < > 44*   < >  --    POTASSIUM 4.7 4.1 5.5*  --    < > 3.8 3.8   < > 4.1   < > 3.8   < > 5.5*   < >  --    TSH  --   --   --   --   --   --   --   --  0.73  --  0.87   < >  --    < > 0.77    < > = values in this interval not displayed.      BP Readings from Last 3 Encounters:   09/09/19 (!) 146/83   09/05/19 114/70   08/15/19 128/55    Wt Readings from Last 3 Encounters:   09/09/19 73.9 kg (163 lb)   09/05/19 72.6 kg (160 lb)   08/15/19 72.7 kg (160 lb 3.2 oz)                      Reviewed and updated as needed this visit by Provider         Review of Systems   ROS COMP: CONSTITUTIONAL: NEGATIVE for fever, chills, change in weight  INTEGUMENTARY/SKIN: NEGATIVE for worrisome rashes, moles or lesions  EYES: NEGATIVE for vision changes or irritation  ENT/MOUTH: NEGATIVE for ear, mouth and throat problems  RESP: NEGATIVE for significant cough or SOB  CV: NEGATIVE for chest pain, palpitations or peripheral edema  GI: NEGATIVE for nausea, abdominal pain, heartburn, or change in bowel habits   female: As above.  MUSCULOSKELETAL: POSITIVE for rheumatoid arthritis.  NEURO: NEGATIVE for weakness, dizziness or paresthesias  ENDOCRINE: NEGATIVE for temperature intolerance, skin/hair changes  HEME: NEGATIVE for bleeding problems  PSYCHIATRIC: NEGATIVE for changes in mood or affect      Objective    /70 (BP Location: Left arm, Patient Position: Chair, Cuff Size: Adult Regular)   Pulse 79   Temp 98.2  F (36.8  C) (Oral)   Ht 1.626 m (5' 4\")   Wt 72.6 kg (160 lb)   LMP  (LMP Unknown)   SpO2 98%   BMI 27.46 kg/m    Body mass index is 27.46 kg/m .  Physical Exam   GENERAL: healthy, alert and no distress  EYES: Eyes grossly normal to inspection  HENT: normal cephalic/atraumatic and oral mucous membranes moist  RESP: lungs clear to auscultation - no rales, rhonchi or wheezes  CV: regular rate and rhythm, normal S1 S2, no S3 or S4, no murmur, click or " rub, no peripheral edema and peripheral pulses strong  ABDOMEN: soft, nontender, without hepatosplenomegaly or masses and bowel sounds normal  MS: no gross musculoskeletal defects noted, no edema  NEURO: Normal strength and tone, sensory exam grossly normal and mentation intact  PSYCH: mentation appears normal, affect normal/bright    Diagnostic Test Results:  Results for orders placed or performed in visit on 09/05/19   Lipid panel reflex to direct LDL Non-fasting   Result Value Ref Range    Cholesterol 196 <200 mg/dL    Triglycerides 120 <150 mg/dL    HDL Cholesterol 52 >49 mg/dL    LDL Cholesterol Calculated 120 (H) <100 mg/dL    Non HDL Cholesterol 144 (H) <130 mg/dL   UA with Microscopic reflex to Culture   Result Value Ref Range    Color Urine Yellow     Appearance Urine Slightly Cloudy     Glucose Urine Negative NEG^Negative mg/dL    Bilirubin Urine Negative NEG^Negative    Ketones Urine Negative NEG^Negative mg/dL    Specific Gravity Urine <=1.005 1.003 - 1.035    pH Urine 7.0 5.0 - 7.0 pH    Protein Albumin Urine Negative NEG^Negative mg/dL    Urobilinogen Urine 0.2 0.2 - 1.0 EU/dL    Nitrite Urine Negative NEG^Negative    Blood Urine Negative NEG^Negative    Leukocyte Esterase Urine Large (A) NEG^Negative    Source Midstream Urine     WBC Urine >100 (A) OTO5^0 - 5 /HPF    RBC Urine O - 2 OTO2^O - 2 /HPF    Squamous Epithelial /LPF Urine Moderate (A) FEW^Few /LPF    Bacteria Urine Few (A) NEG^Negative /HPF   Urine Culture Aerobic Bacterial   Result Value Ref Range    Specimen Description Midstream Urine     Culture Micro >100,000 colonies/mL  Enterococcus faecalis   (A)        Susceptibility    Enterococcus faecalis - JOSE     AMPICILLIN <=2 Sensitive ug/mL     NITROFURANTOIN <=16 Sensitive ug/mL     PENICILLIN 8 Sensitive ug/mL     VANCOMYCIN 1 Sensitive ug/mL     *Note: Due to a large number of results and/or encounters for the requested time period, some results have not been displayed. A complete set of  "results can be found in Results Review.           Assessment & Plan     1. Urinary tract infection due to Enterococcus  Patient claims that she feels better without treatment. Urine culture is positive for Enterococcus faecalis, hence, antibiotics are necessary.Patient is allergic to penicillin and nitrofurantoin.  - UA with Microscopic reflex to Culture  - Urine Culture Aerobic Bacterial  - doxycycline monohydrate (MONODOX) 100 MG capsule; Take 1 capsule (100 mg) by mouth 2 times daily for 14 days  Dispense: 28 capsule; Refill: 1    2. Hyperlipidemia LDL goal <70  Overdue for test  - Lipid panel reflex to direct LDL Non-fasting     BMI:   Estimated body mass index is 27.46 kg/m  as calculated from the following:    Height as of this encounter: 1.626 m (5' 4\").    Weight as of this encounter: 72.6 kg (160 lb).   Weight management plan: diet and exercise.        FUTURE APPOINTMENTS:       - Follow-up visit in 4 weeks.    No follow-ups on file.    Tre Lockett MD  [facility]      "

## 2019-09-05 NOTE — PROGRESS NOTES
Date: 9/5/2019    Time of Call: 3:40 PM     Diagnosis:  HFpEF     [ VORB ] Ordering provider: Karla Mabry NP  Order: Decrease Lasix to 20 mg daily until seen in clinic on Monday.      Order received by: Lizett Zapata RN      Follow-up/additional notes: I called Linda to make sure she saw this Fillm message. She hadn't yet and will make changes until we see her in clinic on Monday for further assessment.

## 2019-09-05 NOTE — TELEPHONE ENCOUNTER
Faxed message from pharmacy:    Pt expecting UTI med but have not gotten rx sent yet.    Please send rx for new med for active UTI.

## 2019-09-06 NOTE — TELEPHONE ENCOUNTER
Pt saw Dr. Lockett (PCP) yesterday, 9/5/19 for UTI and is currently being treated. Closing encounter at this time.    Minna Armenta LPN

## 2019-09-08 LAB
BACTERIA SPEC CULT: ABNORMAL
SPECIMEN SOURCE: ABNORMAL

## 2019-09-09 ENCOUNTER — OFFICE VISIT (OUTPATIENT)
Dept: CARDIOLOGY | Facility: CLINIC | Age: 84
End: 2019-09-09
Payer: MEDICARE

## 2019-09-09 VITALS
DIASTOLIC BLOOD PRESSURE: 83 MMHG | SYSTOLIC BLOOD PRESSURE: 146 MMHG | BODY MASS INDEX: 27.98 KG/M2 | OXYGEN SATURATION: 99 % | WEIGHT: 163 LBS | HEART RATE: 70 BPM

## 2019-09-09 DIAGNOSIS — I49.5 TACHY-BRADY SYNDROME (H): ICD-10-CM

## 2019-09-09 DIAGNOSIS — Z98.890 S/P MITRAL VALVE CLIP IMPLANTATION: ICD-10-CM

## 2019-09-09 DIAGNOSIS — Z95.818 S/P MITRAL VALVE CLIP IMPLANTATION: ICD-10-CM

## 2019-09-09 DIAGNOSIS — N18.4 CKD (CHRONIC KIDNEY DISEASE) STAGE 4, GFR 15-29 ML/MIN (H): ICD-10-CM

## 2019-09-09 DIAGNOSIS — I48.19 PERSISTENT ATRIAL FIBRILLATION (H): ICD-10-CM

## 2019-09-09 DIAGNOSIS — I50.33 ACUTE ON CHRONIC DIASTOLIC HEART FAILURE (H): Primary | ICD-10-CM

## 2019-09-09 LAB
MDC_IDC_LEAD_IMPLANT_DT: NORMAL
MDC_IDC_LEAD_IMPLANT_DT: NORMAL
MDC_IDC_LEAD_LOCATION: NORMAL
MDC_IDC_LEAD_LOCATION: NORMAL
MDC_IDC_LEAD_LOCATION_DETAIL_1: NORMAL
MDC_IDC_LEAD_LOCATION_DETAIL_1: NORMAL
MDC_IDC_LEAD_MFG: NORMAL
MDC_IDC_LEAD_MFG: NORMAL
MDC_IDC_LEAD_MODEL: NORMAL
MDC_IDC_LEAD_MODEL: NORMAL
MDC_IDC_LEAD_POLARITY_TYPE: NORMAL
MDC_IDC_LEAD_POLARITY_TYPE: NORMAL
MDC_IDC_LEAD_SERIAL: NORMAL
MDC_IDC_LEAD_SERIAL: NORMAL
MDC_IDC_MSMT_BATTERY_DTM: NORMAL
MDC_IDC_MSMT_BATTERY_REMAINING_LONGEVITY: 66 MO
MDC_IDC_MSMT_BATTERY_RRT_TRIGGER: 2.83
MDC_IDC_MSMT_BATTERY_STATUS: NORMAL
MDC_IDC_MSMT_BATTERY_VOLTAGE: 3 V
MDC_IDC_MSMT_LEADCHNL_RA_IMPEDANCE_VALUE: 342 OHM
MDC_IDC_MSMT_LEADCHNL_RA_IMPEDANCE_VALUE: 494 OHM
MDC_IDC_MSMT_LEADCHNL_RA_PACING_THRESHOLD_AMPLITUDE: 1 V
MDC_IDC_MSMT_LEADCHNL_RA_PACING_THRESHOLD_PULSEWIDTH: 0.4 MS
MDC_IDC_MSMT_LEADCHNL_RA_SENSING_INTR_AMPL: 1.25 MV
MDC_IDC_MSMT_LEADCHNL_RA_SENSING_INTR_AMPL: 1.25 MV
MDC_IDC_MSMT_LEADCHNL_RV_IMPEDANCE_VALUE: 361 OHM
MDC_IDC_MSMT_LEADCHNL_RV_IMPEDANCE_VALUE: 418 OHM
MDC_IDC_MSMT_LEADCHNL_RV_PACING_THRESHOLD_AMPLITUDE: 0.62 V
MDC_IDC_MSMT_LEADCHNL_RV_PACING_THRESHOLD_PULSEWIDTH: 0.4 MS
MDC_IDC_MSMT_LEADCHNL_RV_SENSING_INTR_AMPL: 19 MV
MDC_IDC_MSMT_LEADCHNL_RV_SENSING_INTR_AMPL: 19 MV
MDC_IDC_PG_IMPLANT_DTM: NORMAL
MDC_IDC_PG_MFG: NORMAL
MDC_IDC_PG_MODEL: NORMAL
MDC_IDC_PG_SERIAL: NORMAL
MDC_IDC_PG_TYPE: NORMAL
MDC_IDC_SESS_CLINIC_NAME: NORMAL
MDC_IDC_SESS_DTM: NORMAL
MDC_IDC_SESS_TYPE: NORMAL
MDC_IDC_SET_BRADY_AT_MODE_SWITCH_RATE: 171 {BEATS}/MIN
MDC_IDC_SET_BRADY_HYSTRATE: NORMAL
MDC_IDC_SET_BRADY_LOWRATE: 70 {BEATS}/MIN
MDC_IDC_SET_BRADY_MAX_SENSOR_RATE: 130 {BEATS}/MIN
MDC_IDC_SET_BRADY_MAX_TRACKING_RATE: 130 {BEATS}/MIN
MDC_IDC_SET_BRADY_MODE: NORMAL
MDC_IDC_SET_BRADY_PAV_DELAY_LOW: 180 MS
MDC_IDC_SET_BRADY_SAV_DELAY_LOW: 150 MS
MDC_IDC_SET_LEADCHNL_RA_PACING_AMPLITUDE: 2 V
MDC_IDC_SET_LEADCHNL_RA_PACING_ANODE_ELECTRODE_1: NORMAL
MDC_IDC_SET_LEADCHNL_RA_PACING_ANODE_LOCATION_1: NORMAL
MDC_IDC_SET_LEADCHNL_RA_PACING_CAPTURE_MODE: NORMAL
MDC_IDC_SET_LEADCHNL_RA_PACING_CATHODE_ELECTRODE_1: NORMAL
MDC_IDC_SET_LEADCHNL_RA_PACING_CATHODE_LOCATION_1: NORMAL
MDC_IDC_SET_LEADCHNL_RA_PACING_POLARITY: NORMAL
MDC_IDC_SET_LEADCHNL_RA_PACING_PULSEWIDTH: 0.4 MS
MDC_IDC_SET_LEADCHNL_RA_SENSING_ANODE_ELECTRODE_1: NORMAL
MDC_IDC_SET_LEADCHNL_RA_SENSING_ANODE_LOCATION_1: NORMAL
MDC_IDC_SET_LEADCHNL_RA_SENSING_CATHODE_ELECTRODE_1: NORMAL
MDC_IDC_SET_LEADCHNL_RA_SENSING_CATHODE_LOCATION_1: NORMAL
MDC_IDC_SET_LEADCHNL_RA_SENSING_POLARITY: NORMAL
MDC_IDC_SET_LEADCHNL_RA_SENSING_SENSITIVITY: 0.3 MV
MDC_IDC_SET_LEADCHNL_RV_PACING_AMPLITUDE: 1.5 V
MDC_IDC_SET_LEADCHNL_RV_PACING_ANODE_ELECTRODE_1: NORMAL
MDC_IDC_SET_LEADCHNL_RV_PACING_ANODE_LOCATION_1: NORMAL
MDC_IDC_SET_LEADCHNL_RV_PACING_CAPTURE_MODE: NORMAL
MDC_IDC_SET_LEADCHNL_RV_PACING_CATHODE_ELECTRODE_1: NORMAL
MDC_IDC_SET_LEADCHNL_RV_PACING_CATHODE_LOCATION_1: NORMAL
MDC_IDC_SET_LEADCHNL_RV_PACING_POLARITY: NORMAL
MDC_IDC_SET_LEADCHNL_RV_PACING_PULSEWIDTH: 0.4 MS
MDC_IDC_SET_LEADCHNL_RV_SENSING_ANODE_ELECTRODE_1: NORMAL
MDC_IDC_SET_LEADCHNL_RV_SENSING_ANODE_LOCATION_1: NORMAL
MDC_IDC_SET_LEADCHNL_RV_SENSING_CATHODE_ELECTRODE_1: NORMAL
MDC_IDC_SET_LEADCHNL_RV_SENSING_CATHODE_LOCATION_1: NORMAL
MDC_IDC_SET_LEADCHNL_RV_SENSING_POLARITY: NORMAL
MDC_IDC_SET_LEADCHNL_RV_SENSING_SENSITIVITY: 0.9 MV
MDC_IDC_SET_ZONE_DETECTION_INTERVAL: 350 MS
MDC_IDC_SET_ZONE_DETECTION_INTERVAL: 400 MS
MDC_IDC_SET_ZONE_TYPE: NORMAL
MDC_IDC_STAT_AT_BURDEN_PERCENT: 100 %
MDC_IDC_STAT_AT_DTM_END: NORMAL
MDC_IDC_STAT_AT_DTM_START: NORMAL
MDC_IDC_STAT_BRADY_AP_VP_PERCENT: 0.03 %
MDC_IDC_STAT_BRADY_AP_VS_PERCENT: 0 %
MDC_IDC_STAT_BRADY_AS_VP_PERCENT: 96.15 %
MDC_IDC_STAT_BRADY_AS_VS_PERCENT: 3.83 %
MDC_IDC_STAT_BRADY_DTM_END: NORMAL
MDC_IDC_STAT_BRADY_DTM_START: NORMAL
MDC_IDC_STAT_BRADY_RA_PERCENT_PACED: 0.02 %
MDC_IDC_STAT_BRADY_RV_PERCENT_PACED: 96.34 %
MDC_IDC_STAT_EPISODE_RECENT_COUNT: 0
MDC_IDC_STAT_EPISODE_RECENT_COUNT_DTM_END: NORMAL
MDC_IDC_STAT_EPISODE_RECENT_COUNT_DTM_START: NORMAL
MDC_IDC_STAT_EPISODE_TOTAL_COUNT: 0
MDC_IDC_STAT_EPISODE_TOTAL_COUNT: 0
MDC_IDC_STAT_EPISODE_TOTAL_COUNT: 4
MDC_IDC_STAT_EPISODE_TOTAL_COUNT: 601
MDC_IDC_STAT_EPISODE_TOTAL_COUNT_DTM_END: NORMAL
MDC_IDC_STAT_EPISODE_TOTAL_COUNT_DTM_START: NORMAL
MDC_IDC_STAT_EPISODE_TYPE: NORMAL

## 2019-09-09 PROCEDURE — 99214 OFFICE O/P EST MOD 30 MIN: CPT | Performed by: NURSE PRACTITIONER

## 2019-09-09 RX ORDER — FUROSEMIDE 20 MG
20 TABLET ORAL 2 TIMES DAILY
Qty: 180 TABLET | Refills: 3 | Status: SHIPPED | OUTPATIENT
Start: 2019-09-09 | End: 2019-09-23

## 2019-09-09 NOTE — NURSING NOTE
"Chief Complaint   Patient presents with     RECHECK     Return CORE, 87 yo female with HFpEF and s/p Mitraclip presents for follow up.        Initial BP (!) 146/83 (BP Location: Right arm, Patient Position: Chair, Cuff Size: Adult Large)   Pulse 70   Wt 73.9 kg (163 lb)   LMP  (LMP Unknown)   SpO2 99%   BMI 27.98 kg/m   Estimated body mass index is 27.98 kg/m  as calculated from the following:    Height as of 9/5/19: 1.626 m (5' 4\").    Weight as of this encounter: 73.9 kg (163 lb)..  BP completed using cuff size: harika Khan MA    "

## 2019-09-09 NOTE — NURSING NOTE
Diet: Patient instructed regarding a heart healthy diet, including discussion of reduced fat and sodium intake. Patient demonstrated understanding of this information and agreed to call with further questions or concerns.  Labs: Patient was given results of the laboratory testing obtained today. Patient was instructed to return for the next laboratory testing in 1 week . Patient demonstrated understanding of this information and agreed to call with further questions or concerns.   Return Appointment: Patient given instructions regarding scheduling next clinic visit. Patient demonstrated understanding of this information and agreed to call with further questions or concerns.  Medication Change: Patient was educated regarding prescribed medication change, including discussion of the indication, administration, side effects, and when to report to MD or RN. Patient demonstrated understanding of this information and agreed to call with further questions or concerns.  Patient stated she understood all health information given and agreed to call with further questions or concerns.   Lizett Zapata RN

## 2019-09-09 NOTE — PROGRESS NOTES
HPI: Linda is an 88-year-old female with a past medical history of CKD, HTN, anemia, pulmonary fibrosis, tachy/bradycardia syndrome s/p permanent pacemaker placement, atrial flutter, PAF, mild to moderate MR s/p mitral clip placement, and HFpEF [place] [facility] [place]. Since her last visit, Linda was diagnosed with a UTI and was placed on doxycycline for a 2 week course. Since her fluid intake was poor and her renal function was worse, I decreased her Lasix to 20 mg daily.      Since decreasing her diuretics, she has gained 3-  4 pounds.  Her blood pressure has increased to 138/55 previously in the 120/130 range over 60-70 range.  Her weight has been climbing up to 158.8 pounds.  She endorses more lower extremity edema, SOB with prolonged conversation, and orthopnea.  She gets disrupted sleep secondary to nocturia.  She denies any PND, palpitations, or chest pain.  She is following her sodium restrictions but isn't measuring her fluid intake.     PAST MEDICAL HISTORY:  Past Medical History:   Diagnosis Date     Adjustment disorder with anxious mood 5/18/2015     Advanced directives, counseling/discussion 8/30/2012    Patient states has Advance Directive and will bring in a copy to clinic. 8/30/2012   Tevin [facility] Medical Assistant \       Anemia 9/25/2015     Basal cell cancer      CHF (congestive heart failure) (H) 9/18/2014     CKD (chronic kidney disease) stage 3, GFR 30-59 ml/min (H) 9/29/2015     DDD (degenerative disc disease), lumbar 9/25/2015     Diffuse idiopathic pulmonary fibrosis (H) 5/6/2013     Encounter for palliative care 5/18/2015     History of blood transfusion 9/29/2015     Hypertension goal BP (blood pressure) < 140/90 9/7/2012     Hypothyroid 9/7/2012     Irritable bowel syndrome 10/29/2013     Macular degeneration      Macular degeneration, 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     Nondisplaced spiral fracture of shaft of humerus      Osteoporosis 8/13/2013     Imo Update utility      RA (rheumatoid arthritis) (H) 5/7/2013     Rheumatic fever      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 2/20/2019     Shingles      Spinal stenosis of lumbar region with neurogenic claudication 9/14/2015       FAMILY HISTORY:  Family History   Problem Relation Age of Onset     Hypertension Mother      Psychotic Disorder Father      Diabetes Son      Diabetes Daughter      Blood Disease Daughter        SOCIAL HISTORY:  Social History     Socioeconomic History     Marital status:      Spouse name: None     Number of children: None     Years of education: None     Highest education level: None   Occupational History     None   Social Needs     Financial resource strain: None     Food insecurity:     Worry: None     Inability: None     Transportation needs:     Medical: None     Non-medical: None   Tobacco Use     Smoking status: Never Smoker     Smokeless tobacco: Never Used   Substance and Sexual Activity     Alcohol use: Yes     Comment: rare wine      Drug use: No     Sexual activity: Never   Lifestyle     Physical activity:     Days per week: 0 days     Minutes per session: 0 min     Stress: None   Relationships     Social connections:     Talks on phone: None     Gets together: None     Attends Rastafari service: None     Active member of club or organization: None     Attends meetings of clubs or organizations: None     Relationship status: None     Intimate partner violence:     Fear of current or ex partner: None     Emotionally abused: None     Physically abused: None     Forced sexual activity: None   Other Topics Concern     Parent/sibling w/ CABG, MI or angioplasty before 65F 55M? No   Social History Narrative    . Has six children. She enjoys bridge and genealogy.  passed away.  Her son has financial and alcohol issues.       CURRENT MEDICATIONS:  Current Outpatient Medications   Medication Sig Dispense Refill     apixaban ANTICOAGULANT (ELIQUIS) 2.5 MG tablet Take 1 tablet (2.5 mg)  by mouth 2 times daily 60 tablet 9     azaTHIOprine (IMURAN) 50 MG tablet Take 1 tablet (50 mg) by mouth daily 90 tablet 2     COMPOUNDED NON-CONTROLLED SUBSTANCE (CMPD RX) - PHARMACY TO MIX COMPOUNDED MEDICATION Estriol 1mg/gram. Place 1 gram vaginally daily for 2 weeks. Then vaginally twice weekly 30 g 6     denosumab (PROLIA) 60 MG/ML SOLN injection Inject 1 mL (60 mg) Subcutaneous every 6 months 1 mL      diphenhydrAMINE (BENADRYL) 25 MG tablet Take 25 mg by mouth as needed Patient takes 25-50mg 1-2 times a day.       doxycycline monohydrate (MONODOX) 100 MG capsule Take 1 capsule (100 mg) by mouth 2 times daily for 14 days 28 capsule 1     furosemide (LASIX) 20 MG tablet Take 1 tablet (20 mg) by mouth daily 270 tablet 3     hydrALAZINE (APRESOLINE) 10 MG tablet Take 1 tablet (10 mg) by mouth 3 times daily 90 tablet 3     isosorbide mononitrate (IMDUR) 60 MG 24 hr tablet Take 1 tablet (60 mg) by mouth daily 90 tablet 1     levothyroxine (SYNTHROID/LEVOTHROID) 75 MCG tablet TAKE 1 TABLET BY MOUTH EVERY DAY 90 tablet 0     ranitidine (ZANTAC) 150 MG tablet Take 1 tablet (150 mg) by mouth At Bedtime       senna-docusate (SENOKOT-S/PERICOLACE) 8.6-50 MG tablet Take 1 tablet by mouth daily as needed for constipation 100 tablet 3     trimethoprim (TRIMPEX) 100 MG tablet Take 0.5 tablets (50 mg) by mouth daily 45 tablet 0     valACYclovir (VALTREX) 1000 mg tablet Take 1 tablet (1,000 mg) by mouth 2 times daily 14 tablet 2     carvedilol (COREG) 3.125 MG tablet Take 1 tablet (3.125 mg) by mouth 2 times daily (with meals) 60 tablet 0     doxycycline monohydrate (MONODOX) 100 MG capsule Take 1 capsule (100 mg) by mouth 2 times daily for 14 days 28 capsule 1     doxycycline monohydrate (MONODOX) 100 MG capsule Take 1 capsule (100 mg) by mouth 2 times daily for 14 days 28 capsule 1     nitroGLYcerin (NITROSTAT) 0.4 MG sublingual tablet Place 1 tablet (0.4 mg) under the tongue every 5 minutes as needed for chest pain 25  tablet 11     order for DME Dispense SP Walker-small 1 each 0     order for DME Equipment being ordered: Dispense baffle, for use with nebulizer. 1 each 0     order for DME Equipment being ordered: Nebulizer. Use with Albuterol. 1 each 0     order for DME Equipment being ordered: Dispense face mask.  Mrs. Spicer is immunosuppressed due to rheumatoid arthritis. 1 Box 11       ROS:  Constitutional: Denies fever, chills, or diaphoresis.  +weight gain  Respiratory:  See HPI.     Cardiovascular: As per HPI.   GI: Denies nausea, vomiting, diarrhea, hematemesis, melena, or hematochezia.   : See HPI   Integument: Negative for bruising, rash, or pruritis.  Psychiatric: See HPI, +anxiety and depression  Neuro: Negative for headaches, syncope, numbness or tingling.   Endocrinology:  Hypothyroidism  Musculoskeletal:  +gait instability    EXAM:  BP (!) 146/83 (BP Location: Right arm, Patient Position: Chair, Cuff Size: Adult Large)   Pulse 70   Wt 73.9 kg (163 lb)   LMP  (LMP Unknown)   SpO2 99%   BMI 27.98 kg/m    General: alert, articulate, and in no acute distress.  HEENT: normocephalic, atraumatic, anicteric sclera, EOMI, mucosa moist, no cyanosis.    Neck: neck supple.  JVP 12 cm  Heart: Irregular rhythm, normal S1/S2, no murmur, gallop, rub.  Precordium quiet with normal PMI.     Lungs: clear, no rales, ronchi, or wheezing.  No accessory muscle use, respirations unlabored.   Abdomen: soft, non-tender, bowel sounds present, no hepatomegaly  Extremities: 1+ pitting edema.  No cyanosis.  Extremities warm to touch.  Neurological: Alert and oriented x 3.  Normal speech and affect, no gross motor deficits  Skin:  No ecchymoses, rashes, or clubbing.       Labs:  CBC RESULTS:  Lab Results   Component Value Date    WBC 5.8 07/05/2019    RBC 3.95 07/05/2019    HGB 12.3 07/05/2019    HCT 39.0 07/05/2019    MCV 99 07/05/2019    MCH 31.1 07/05/2019    MCHC 31.5 07/05/2019    RDW 14.5 07/05/2019     07/05/2019       CMP  RESULTS:  Lab Results   Component Value Date     09/05/2019    POTASSIUM 4.7 09/05/2019    CHLORIDE 106 09/05/2019    CO2 28 09/05/2019    ANIONGAP 7 09/05/2019     (H) 09/05/2019    BUN 38 (H) 09/05/2019    CR 1.58 (H) 09/05/2019    GFRESTIMATED 29 (L) 09/05/2019    GFRESTBLACK 33 (L) 09/05/2019    FATOUMATA 8.7 09/05/2019    BILITOTAL 0.4 06/20/2019    ALBUMIN 3.6 06/20/2019    ALKPHOS 67 06/20/2019    ALT 43 06/20/2019    AST 44 06/20/2019        INR RESULTS:  Lab Results   Component Value Date    INR 1.12 02/11/2019       No components found for: CK  Lab Results   Component Value Date    MAG 2.1 02/12/2019     Lab Results   Component Value Date    NTBNP 3,154 (H) 10/29/2018       Most recent echocardiogram 7/5/19:  Interpretation Summary  Left and right ventricular function are normal.The EF is 55-60%.  S/P MitraClip. The clip appears well-seated with capture of both mitral  leaflets. There is mild residual MR. The mean mitral inflow gradient is 8 mmHg  at 70 bpm.  Mild pulmonary hypertension. Pulmonary artery systolic pressure is 48 mmHg (45  mmHg+RA pressure).  Moderate tricuspid insufficiency is present.     Compared to prior TTE dated 3/13/19: Mitral inflow gradient has increased  slightly. There has otherwise been no significant change.    Assessment and Plan:    In summary, this is a very pleasant 88 year old with HFpEF who appears hypervolemic today.  She is back to her normal oral intake.  I increased her Lasix to 20 mg twice daily.  She will repeat labs in 1 week.  If her weight and swelling do not improve on Lasix 20 mg twice daily, I instructed her to take 40 mg in the morning and 20 mg in the afternoon until her weight goes back to baseline, and then decrease back to 20 mg twice daily.  She will return to [facility] as needed for symptoms.    1. HFpEF:   Stage C  NYHA CLASS IIIb:  Symptoms with minimal activity, recent dyspnea at rest  BP: 146/83.  Monitor for now as her trends have been lower.  Diurese as outlined above.   Fluid status hypervolemic  Aldosterone antagonist: contraindicated due to renal dysfunction  Ischemia evaluation: Negative stress test in 2014  ACEi/ARB -  N/a HFpEF  BB - Coreg 3.125 mg twice daily  SCD prophylaxis - N/A, EF is normal   NSAID use: Contraindicated, reviewed with patient   Sleep Apnea Evaluation: Refused sleep study evaluation  Remote Monitoring:  None    2.  Atrial Fibrillation:  Anticoagulation: Apixaban 2.5 mg twice daily    Rate control:  Coreg 3.125 mg twice daily.  ChadsVasc Score: 4  HR 70.     3.  Tachy-Jadiel syndrome s/p PPM:  Followed by EP.  Device checks per protocol.     4.  CKD Stage IV:  Creatinine 1.58  today. Baseline per record review is 1.4.  Suspect cardiorenal.  Diurese as outlined above.      5. Mitral regurgitation:  S/p Mitraclip.  Followed by Dr. Loza.  Follow up with him per previous recommendations.      6.  Follow-up: [facility] as needed. Device in May 2020.  Dr. Loza this fall.      Karla MOELLER, CNP  [facility]

## 2019-09-09 NOTE — LETTER
9/9/2019      RE: Linda Spicer  [address]  [address]  [facility] [address]       Dear Colleague,    Thank you for the opportunity to participate in the care of your patient, Linda Spicer, at the [facility] AT [facility]. Please see a copy of my visit note below.      HPI: Linda is an [age]-year-old female with a past medical history of CKD, HTN, anemia, pulmonary fibrosis, tachy/bradycardia syndrome s/p permanent pacemaker placement, atrial flutter, PAF, mild to moderate MR s/p mitral clip placement, and HFpEF who [place] [facility] [place] gain. Since her last visit, Linda was diagnosed with a UTI and was placed on doxycycline for a 2 week course. Since her fluid intake was poor and her renal function was worse, I decreased her Lasix to 20 mg daily.      Since decreasing her diuretics, she has gained 3-  4 pounds.  Her blood pressure has increased to 138/55 previously in the 120/130 range over 60-70 range.  Her weight has been climbing up to 158.8 pounds.  She endorses more lower extremity edema, SOB with prolonged conversation, and orthopnea.  She gets disrupted sleep secondary to nocturia.  She denies any PND, palpitations, or chest pain.  She is following her sodium restrictions but isn't measuring her fluid intake.     PAST MEDICAL HISTORY:  Past Medical History:   Diagnosis Date     Adjustment disorder with anxious mood 5/18/2015     Advanced directives, counseling/discussion 8/30/2012    Patient states has Advance Directive and will bring in a copy to clinic. 8/30/2012   Tevin May  [facility] Medical Assistant \       Anemia 9/25/2015     Basal cell cancer      CHF (congestive heart failure) (H) 9/18/2014     CKD (chronic kidney disease) stage 3, GFR 30-59 ml/min (H) 9/29/2015     DDD (degenerative disc disease), lumbar 9/25/2015     Diffuse idiopathic pulmonary fibrosis (H) 5/6/2013     Encounter for  palliative care 5/18/2015     History of blood transfusion 9/29/2015     Hypertension goal BP (blood pressure) < 140/90 9/7/2012     Hypothyroid 9/7/2012     Irritable bowel syndrome 10/29/2013     Macular degeneration      Macular degeneration, 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     Nondisplaced spiral fracture of shaft of humerus      Osteoporosis 8/13/2013     Imo Update utility     RA (rheumatoid arthritis) (H) 5/7/2013     Rheumatic fever      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 2/20/2019     Shingles      Spinal stenosis of lumbar region with neurogenic claudication 9/14/2015       FAMILY HISTORY:  Family History   Problem Relation Age of Onset     Hypertension Mother      Psychotic Disorder Father      Diabetes Son      Diabetes Daughter      Blood Disease Daughter        SOCIAL HISTORY:  Social History     Socioeconomic History     Marital status:      Spouse name: None     Number of children: None     Years of education: None     Highest education level: None   Occupational History     None   Social Needs     Financial resource strain: None     Food insecurity:     Worry: None     Inability: None     Transportation needs:     Medical: None     Non-medical: None   Tobacco Use     Smoking status: Never Smoker     Smokeless tobacco: Never Used   Substance and Sexual Activity     Alcohol use: Yes     Comment: rare wine      Drug use: No     Sexual activity: Never   Lifestyle     Physical activity:     Days per week: 0 days     Minutes per session: 0 min     Stress: None   Relationships     Social connections:     Talks on phone: None     Gets together: None     Attends Yarsani service: None     Active member of club or organization: None     Attends meetings of clubs or organizations: None     Relationship status: None     Intimate partner violence:     Fear of current or ex partner: None     Emotionally abused: None     Physically abused: None     Forced sexual activity: None   Other Topics Concern      Parent/sibling w/ CABG, MI or angioplasty before 65F 55M? No   Social History Narrative    . Has six children. She enjoys bridge and genealogy.  passed away.  Her son has financial and alcohol issues.       CURRENT MEDICATIONS:  Current Outpatient Medications   Medication Sig Dispense Refill     apixaban ANTICOAGULANT (ELIQUIS) 2.5 MG tablet Take 1 tablet (2.5 mg) by mouth 2 times daily 60 tablet 9     azaTHIOprine (IMURAN) 50 MG tablet Take 1 tablet (50 mg) by mouth daily 90 tablet 2     COMPOUNDED NON-CONTROLLED SUBSTANCE (CMPD RX) - PHARMACY TO MIX COMPOUNDED MEDICATION Estriol 1mg/gram. Place 1 gram vaginally daily for 2 weeks. Then vaginally twice weekly 30 g 6     denosumab (PROLIA) 60 MG/ML SOLN injection Inject 1 mL (60 mg) Subcutaneous every 6 months 1 mL      diphenhydrAMINE (BENADRYL) 25 MG tablet Take 25 mg by mouth as needed Patient takes 25-50mg 1-2 times a day.       doxycycline monohydrate (MONODOX) 100 MG capsule Take 1 capsule (100 mg) by mouth 2 times daily for 14 days 28 capsule 1     furosemide (LASIX) 20 MG tablet Take 1 tablet (20 mg) by mouth daily 270 tablet 3     hydrALAZINE (APRESOLINE) 10 MG tablet Take 1 tablet (10 mg) by mouth 3 times daily 90 tablet 3     isosorbide mononitrate (IMDUR) 60 MG 24 hr tablet Take 1 tablet (60 mg) by mouth daily 90 tablet 1     levothyroxine (SYNTHROID/LEVOTHROID) 75 MCG tablet TAKE 1 TABLET BY MOUTH EVERY DAY 90 tablet 0     ranitidine (ZANTAC) 150 MG tablet Take 1 tablet (150 mg) by mouth At Bedtime       senna-docusate (SENOKOT-S/PERICOLACE) 8.6-50 MG tablet Take 1 tablet by mouth daily as needed for constipation 100 tablet 3     trimethoprim (TRIMPEX) 100 MG tablet Take 0.5 tablets (50 mg) by mouth daily 45 tablet 0     valACYclovir (VALTREX) 1000 mg tablet Take 1 tablet (1,000 mg) by mouth 2 times daily 14 tablet 2     carvedilol (COREG) 3.125 MG tablet Take 1 tablet (3.125 mg) by mouth 2 times daily (with meals) 60 tablet 0      doxycycline monohydrate (MONODOX) 100 MG capsule Take 1 capsule (100 mg) by mouth 2 times daily for 14 days 28 capsule 1     doxycycline monohydrate (MONODOX) 100 MG capsule Take 1 capsule (100 mg) by mouth 2 times daily for 14 days 28 capsule 1     nitroGLYcerin (NITROSTAT) 0.4 MG sublingual tablet Place 1 tablet (0.4 mg) under the tongue every 5 minutes as needed for chest pain 25 tablet 11     order for DME Dispense SP Walker-small 1 each 0     order for DME Equipment being ordered: Dispense baffle, for use with nebulizer. 1 each 0     order for DME Equipment being ordered: Nebulizer. Use with Albuterol. 1 each 0     order for DME Equipment being ordered: Dispense face mask.  Mrs. Spicer is immunosuppressed due to rheumatoid arthritis. 1 Box 11       ROS:  Constitutional: Denies fever, chills, or diaphoresis.  +weight gain  Respiratory:  See HPI.     Cardiovascular: As per HPI.   GI: Denies nausea, vomiting, diarrhea, hematemesis, melena, or hematochezia.   : See HPI   Integument: Negative for bruising, rash, or pruritis.  Psychiatric: See HPI, +anxiety and depression  Neuro: Negative for headaches, syncope, numbness or tingling.   Endocrinology:  Hypothyroidism  Musculoskeletal:  +gait instability    EXAM:  BP (!) 146/83 (BP Location: Right arm, Patient Position: Chair, Cuff Size: Adult Large)   Pulse 70   Wt 73.9 kg (163 lb)   LMP  (LMP Unknown)   SpO2 99%   BMI 27.98 kg/m     General: alert, articulate, and in no acute distress.  HEENT: normocephalic, atraumatic, anicteric sclera, EOMI, mucosa moist, no cyanosis.    Neck: neck supple.  JVP 12 cm  Heart: Irregular rhythm, normal S1/S2, no murmur, gallop, rub.  Precordium quiet with normal PMI.     Lungs: clear, no rales, ronchi, or wheezing.  No accessory muscle use, respirations unlabored.   Abdomen: soft, non-tender, bowel sounds present, no hepatomegaly  Extremities: 1+ pitting edema.  No cyanosis.  Extremities warm to touch.  Neurological: Alert and  oriented x 3.  Normal speech and affect, no gross motor deficits  Skin:  No ecchymoses, rashes, or clubbing.       Labs:  CBC RESULTS:  Lab Results   Component Value Date    WBC 5.8 07/05/2019    RBC 3.95 07/05/2019    HGB 12.3 07/05/2019    HCT 39.0 07/05/2019    MCV 99 07/05/2019    MCH 31.1 07/05/2019    MCHC 31.5 07/05/2019    RDW 14.5 07/05/2019     07/05/2019       CMP RESULTS:  Lab Results   Component Value Date     09/05/2019    POTASSIUM 4.7 09/05/2019    CHLORIDE 106 09/05/2019    CO2 28 09/05/2019    ANIONGAP 7 09/05/2019     (H) 09/05/2019    BUN 38 (H) 09/05/2019    CR 1.58 (H) 09/05/2019    GFRESTIMATED 29 (L) 09/05/2019    GFRESTBLACK 33 (L) 09/05/2019    FATOUMATA 8.7 09/05/2019    BILITOTAL 0.4 06/20/2019    ALBUMIN 3.6 06/20/2019    ALKPHOS 67 06/20/2019    ALT 43 06/20/2019    AST 44 06/20/2019        INR RESULTS:  Lab Results   Component Value Date    INR 1.12 02/11/2019       No components found for: CK  Lab Results   Component Value Date    MAG 2.1 02/12/2019     Lab Results   Component Value Date    NTBNP 3,154 (H) 10/29/2018       Most recent echocardiogram 7/5/19:  Interpretation Summary  Left and right ventricular function are normal.The EF is 55-60%.  S/P MitraClip. The clip appears well-seated with capture of both mitral  leaflets. There is mild residual MR. The mean mitral inflow gradient is 8 mmHg  at 70 bpm.  Mild pulmonary hypertension. Pulmonary artery systolic pressure is 48 mmHg (45  mmHg+RA pressure).  Moderate tricuspid insufficiency is present.     Compared to prior TTE dated 3/13/19: Mitral inflow gradient has increased  slightly. There has otherwise been no significant change.    Assessment and Plan:    In summary, this is a very pleasant 88 year old with HFpEF who appears hypervolemic today.  She is back to her normal oral intake.  I increased her Lasix to 20 mg twice daily.  She will repeat labs in 1 week.  If her weight and swelling do not improve on Lasix 20  mg twice daily, I instructed her to take 40 mg in the morning and 20 mg in the afternoon until her weight goes back to baseline, and then decrease back to 20 mg twice daily.  She will return to [facility] as needed for symptoms.    1. HFpEF:   Stage C  NYHA CLASS IIIb:  Symptoms with minimal activity, recent dyspnea at rest  BP: 146/83.  Monitor for now as her trends have been lower. Diurese as outlined above.   Fluid status hypervolemic  Aldosterone antagonist: contraindicated due to renal dysfunction  Ischemia evaluation: Negative stress test in 2014  ACEi/ARB -  N/a HFpEF  BB - Coreg 3.125 mg twice daily  SCD prophylaxis - N/A, EF is normal   NSAID use: Contraindicated, reviewed with patient   Sleep Apnea Evaluation: Refused sleep study evaluation  Remote Monitoring:  None    2.  Atrial Fibrillation:  Anticoagulation: Apixaban 2.5 mg twice daily    Rate control:  Coreg 3.125 mg twice daily.  ChadsVasc Score: 4  HR 70.     3.  Tachy-Jadiel syndrome s/p PPM:  Followed by EP.  Device checks per protocol.     4.  CKD Stage IV:  Creatinine 1. 58  today. Baseline per record review is 1.4.   Suspect cardiorenal.  Diurese as outlined above.      5. Mitral regurgitation:  S/p Mitraclip.  Followed by Dr. Loza.  Follow up with him per previous recommendations.      6.  Follow-up:  [facility] needed. Device in May 2020.  Dr. Loza this fall.      Karla MOELLER, CNP  [facility]

## 2019-09-10 DIAGNOSIS — I48.19 PERSISTENT ATRIAL FIBRILLATION (H): Primary | ICD-10-CM

## 2019-09-10 NOTE — TELEPHONE ENCOUNTER
Called and spoke to patient regarding symptoms. Patient reports urinary frequency for the past 2 days and has noticed this more during the night. Patient denies fevers, chills, and dysuria. Informed patient that she could leave a urine sample to check for urinary tract infection and patient would like to do this. Future UA/UC ordered and sent for Asia Steven PA-C to review and sign. Patient plans to complete urine sample at [facility] and is aware that she will be contacted with results. Patient allergies verified.    Sena Chapman RN, BSN     (2) potential problem

## 2019-09-12 ENCOUNTER — TELEPHONE (OUTPATIENT)
Dept: INFUSION THERAPY | Facility: CLINIC | Age: 84
End: 2019-09-12

## 2019-09-12 NOTE — TELEPHONE ENCOUNTER
RN noted in pt's chart that she was placed on abx for a UTI on 9/6/19.  Pt scheduled to receive Orencia infusion today.  RN paged pt's provider, Dr. Mario Davenport, for clearance.  Per Dr. Davenport, pt should wait until she has completed antibiotics before she receives her infusion.  RN called pt to inform her.  Pt verbalized understanding, requested that scheduling team call her daughter to reschedule the appointment for next week.

## 2019-09-13 ENCOUNTER — TELEPHONE (OUTPATIENT)
Dept: CARDIOLOGY | Facility: CLINIC | Age: 84
End: 2019-09-13

## 2019-09-13 NOTE — TELEPHONE ENCOUNTER
Called and spoke to patient. Received application form from [email or web address] for her Eliquis.  Form completed, will have Dr Loza sign on Monday, his next clinic day, and then fax in. Patient will plan to fill out her portion on the application she receives and mail it in.     BRANDI Berger

## 2019-09-16 ENCOUNTER — NURSE TRIAGE (OUTPATIENT)
Dept: NURSING | Facility: CLINIC | Age: 84
End: 2019-09-16

## 2019-09-16 ENCOUNTER — MYC MEDICAL ADVICE (OUTPATIENT)
Dept: UROLOGY | Facility: CLINIC | Age: 84
End: 2019-09-16

## 2019-09-16 NOTE — TELEPHONE ENCOUNTER
Linda has been on an antibiotic for an urinary tract infection since 9/5/2019. She has not urinated for over 12 hours and even then she only voided a small amount of urine around midnight. She's very uncomfortable because of her bladder feeling very full.  She's anxious thinking of the possibility of sepsis.   She took furosemide this morning.   She normally has breathing difficulty and stated this might be a little worse.  Because she's so uncomfortable with her full bladder, when she asked me what hospital she should go to, I told her to go to the nearest hospital which she told me is [facility].  I told her if they felt more care was needed for her once they were able to catheterize her they would deal with that.  She said her daughter will take her now to the [facility].    Reason for Disposition    [1] Unable to urinate (or only a few drops) > 4 hours AND     [2] bladder feels very full (e.g., palpable bladder or strong urge to urinate)    Additional Information    Negative: Shock suspected (e.g., cold/pale/clammy skin, too weak to stand, low BP, rapid pulse)    Negative: Sounds like a life-threatening emergency to the triager    Protocols used: URINARY SYMPTOMS-LORNE-FLAKITO LEYVA RN [facility]

## 2019-09-17 ENCOUNTER — TELEPHONE (OUTPATIENT)
Dept: CARDIOLOGY | Facility: CLINIC | Age: 84
End: 2019-09-17

## 2019-09-17 ENCOUNTER — OFFICE VISIT (OUTPATIENT)
Dept: FAMILY MEDICINE | Facility: CLINIC | Age: 84
End: 2019-09-17
Payer: MEDICARE

## 2019-09-17 ENCOUNTER — TELEPHONE (OUTPATIENT)
Dept: NEUROLOGY | Facility: CLINIC | Age: 84
End: 2019-09-17

## 2019-09-17 VITALS
HEIGHT: 64 IN | DIASTOLIC BLOOD PRESSURE: 74 MMHG | OXYGEN SATURATION: 97 % | SYSTOLIC BLOOD PRESSURE: 127 MMHG | TEMPERATURE: 98.3 F | RESPIRATION RATE: 16 BRPM | WEIGHT: 158 LBS | BODY MASS INDEX: 26.98 KG/M2 | HEART RATE: 74 BPM

## 2019-09-17 DIAGNOSIS — N28.9 RENAL INSUFFICIENCY: ICD-10-CM

## 2019-09-17 DIAGNOSIS — Z23 NEED FOR IMMUNIZATION AGAINST INFLUENZA: ICD-10-CM

## 2019-09-17 DIAGNOSIS — R33.9 URINARY RETENTION: ICD-10-CM

## 2019-09-17 DIAGNOSIS — E03.9 HYPOTHYROIDISM, UNSPECIFIED TYPE: ICD-10-CM

## 2019-09-17 DIAGNOSIS — N39.0 ENTEROCOCCUS UTI: Primary | ICD-10-CM

## 2019-09-17 DIAGNOSIS — B95.2 ENTEROCOCCUS UTI: Primary | ICD-10-CM

## 2019-09-17 PROCEDURE — 99214 OFFICE O/P EST MOD 30 MIN: CPT | Performed by: INTERNAL MEDICINE

## 2019-09-17 ASSESSMENT — MIFFLIN-ST. JEOR: SCORE: 1131.68

## 2019-09-17 ASSESSMENT — PAIN SCALES - GENERAL: PAINLEVEL: NO PAIN (0)

## 2019-09-17 NOTE — TELEPHONE ENCOUNTER
"Requested Prescriptions   Pending Prescriptions Disp Refills     levothyroxine (SYNTHROID/LEVOTHROID) 75 MCG tablet [Pharmacy Med Name: LEVOTHYROXINE 75 MCG TABLET]  Last Written Prescription Date:  06/24/19  Last Fill Quantity: 90,  # refills: 0   Last Office Visit with [facility], [facility] or [facility] prescribing provider:  09/05/19-Vocal   Future Office Visit:    Next 5 appointments (look out 90 days)    Nov 05, 2019 12:40 PM CST  [facility] [address] [place] [id number] [phone]        90 tablet 0     Sig: TAKE 1 TABLET BY MOUTH EVERY DAY       Thyroid Protocol Passed - 9/17/2019  1:48 AM        Passed - Patient is 12 years or older        Passed - Recent (12 mo) or future (30 days) visit within the authorizing provider's specialty     Patient had office visit in the last 12 months or has a visit in the next 30 days with authorizing provider or within the authorizing provider's specialty.  See \"Patient Info\" tab in inbasket, or \"Choose Columns\" in Meds & Orders section of the refill encounter.              Passed - Medication is active on med list        Passed - Normal TSH on file in past 12 months     Recent Labs   Lab Test 01/18/19  0447   TSH 0.73              Passed - No active pregnancy on record     If patient is pregnant or has had a positive pregnancy test, please check TSH.          Passed - No positive pregnancy test in past 12 months     If patient is pregnant or has had a positive pregnancy test, please check TSH.            "

## 2019-09-17 NOTE — TELEPHONE ENCOUNTER
Message left on Voice mail at 8:32 09/17/19. Pt requesting call from Stacye to discuss if she needs any preventive medication before her dental appt next week.  Pt also was at the ER yesterday and wants to fill her in on the details.    Eamon call pt at [phone].    Yuly Gaitan LPN

## 2019-09-17 NOTE — PATIENT INSTRUCTIONS
At [facility], we strive to deliver an exceptional experience to you, every time we see you.  If you receive a survey in the mail, please send us back your thoughts. We really do value your feedback.    Based on your medical history, these are the current health maintenance/preventive care services that you are due for (some may have been done at this visit.)  Health Maintenance Due   Topic Date Due     ZOSTER IMMUNIZATION (1 of 2) 06/13/1981     MEDICARE ANNUAL WELLNESS VISIT  05/28/2015     MICROALBUMIN  07/26/2018     HF ACTION PLAN  06/21/2019     INFLUENZA VACCINE (1) 09/01/2019         Suggested websites for health information:  Www.[employer].org : Up to date and easily searchable information on multiple topics.  Www.medlineplus.gov : medication info, interactive tutorials, watch real surgeries online  Www.familydoctor.org : good info from the Academy of Family Physicians  Www.cdc.gov : public health info, travel advisories, epidemics (H1N1)  Www.aap.org : children's health info, normal development, vaccinations  Www.health.[facility].[place].us : [place] dept of health, public health issues in [place], N1N1    Your care team:                            Family Medicine Internal Medicine   MD Tre Hicks MD Shantel Branch-Fleming, MD Katya Georgiev PA-C Nam Ho, MD Pediatrics   ABY Snyder, KAVEH Phillips APRMD Maritza Montoya CNP, MD Deborah Mielke, MD Kim Thein, APRN [facility]      Clinic hours: Monday - Thursday 7 am-7 pm; Fridays 7 am-5 pm.   Urgent care: Monday - Friday 11 am-9 pm; Saturday and Sunday 9 am-5 pm.  Pharmacy : Monday -Thursday 8 am-8 pm; Friday 8 am-6 pm; Saturday and Sunday 9 am-5 pm.     Clinic: [phone]   Pharmacy: [phone]

## 2019-09-17 NOTE — TELEPHONE ENCOUNTER
Spoke with son Arjun, patient following up with pcp and will proceed from there based on Dr Lockett's recommendations. Duplicate encounter, see previous note mychart message from yesterday and today. Kelley Chowdhury RN

## 2019-09-17 NOTE — TELEPHONE ENCOUNTER
----- Message from Lizett Zapata RN sent at 9/17/2019  2:17 PM CDT -----  Regarding: schedule monday 9/23  Hi - can you add on Linda to Monday 9/23 schedule with Karla for [facility] return at 10:00? I put a hold on the spot. Her labs are scheduled for Friday already. Thanks!  Blanca

## 2019-09-17 NOTE — TELEPHONE ENCOUNTER
Health Call Center    Phone Message    May a detailed message be left on voicemail: yes    Reason for Call: Other: Pt's son Arjun, requesting call back to discuss the pt's f/u care. Arjun stated the pt was recently in the ER and Arjun just needs to f/u if there needs to be a change of medications or anything like that     Action Taken: Message routed to:  Other: cardio Hugo

## 2019-09-17 NOTE — PROGRESS NOTES
Subjective     Linda Spicer is a 88 year old female who presents to clinic today for the following health issues:    HPI   ED/UC Followup:    Facility:  [facility]   Date of visit: 09/16/2019  Reason for visit: Urinary Problem   Current Status: same        URINARY TRACT SYMPTOMS      Duration: 09/01/2019    Description  dysuria and hesitancy    Intensity:  moderate    Accompanying signs and symptoms:  Fever/chills: no   Flank pain no   Nausea and vomiting: no   Vaginal symptoms: none  Abdominal/Pelvic Pain: no     History  History of frequent UTI's: YES  History of kidney stones: no   Sexually Active: no   Possibility of pregnancy: No    Precipitating or alleviating factors: None    Therapies tried and outcome: Went to [facility]   Outcome: currently has a catheter         Patient Active Problem List   Diagnosis     Hypertension goal BP (blood pressure) < 150/90     Hypothyroidism     Seropositive rheumatoid arthritis (H)     Macular degeneration, left eye     Irritable bowel syndrome     Encounter for palliative care     Adjustment disorder with anxious mood     Mild anemia     DDD (degenerative disc disease), lumbar     CKD (chronic kidney disease) stage 3, GFR 30-59 ml/min (H)     History of blood transfusion     Aftercare following surgery     S/P lumbar laminectomy     High risk medication use     Age-related osteoporosis without current pathological fracture     Atrophic vaginitis     Fecal incontinence     Female stress incontinence     Impingement syndrome of both shoulders     UIP (usual interstitial pneumonitis) (H)     High risk medications (not anticoagulants) long-term use     Heart failure with preserved ejection fraction (H)     Congestive heart failure with preserved LV function, NYHA class 3 (H)     Other specified hypothyroidism     Rheumatoid arthritis involving multiple sites with positive rheumatoid factor (H)     Health Care Home     Sick sinus syndrome (H)     Cardiac pacemaker -  Medtronic dual lead pacemaker - Not Dependent - MRI Safe     Immunosuppression (H)     ILD (interstitial lung disease) (H)     Heart failure with preserved ejection fraction, NYHA class I (H)     Atrial flutter (H)     Persistent atrial fibrillation (H)     CHF exacerbation (H)     Pre-syncope     (HFpEF) heart failure with preserved ejection fraction (H)     Mitral regurgitation     SOB (shortness of breath)     Mitral valve insufficiency, unspecified etiology     Abnormal findings on diagnostic imaging of cardiovascular system     Status post coronary angiogram     Dyspnea     RUSSELL (dyspnea on exertion)     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     Swelling of right lower extremity     Enterococcus UTI     Renal insufficiency     Urinary retention     Past Surgical History:   Procedure Laterality Date     ANESTHESIA CARDIOVERSION N/A 9/14/2018    Procedure: ANESTHESIA CARDIOVERSION;;  Surgeon: GENERIC ANESTHESIA PROVIDER;  Location: [facility]     ANESTHESIA CARDIOVERSION N/A 10/11/2018    Procedure: ANESTHESIA CARDIOVERSION;  Cardioversion;  Surgeon: GENERIC ANESTHESIA PROVIDER;  Location: [facility] OR     ANESTHESIA CARDIOVERSION N/A 10/16/2018    Procedure: Anesthesia Cardioversion ;  Surgeon: GENERIC ANESTHESIA PROVIDER;  [facility]     APPENDECTOMY       BIOPSY      hemorrhoidectomy     CV HEART CATHETERIZATION WITH POSSIBLE INTERVENTION N/A 1/31/2019    Procedure: CORS,LHC,RHC-YOLANDA BEFORE CATH APPOINTMENT;  Surgeon: Avila Watson MD;  Location:  HEART CARDIAC CATH LAB     CV MITRACLIP N/A 2/11/2019    Procedure: Mitraclip Procedure;  Surgeon: Avila Watson MD;  [facility]: [facility] OR     ENT SURGERY      tonsillectomy     GYN SURGERY      3 D & C's     HYSTERECTOMY, PAP NO LONGER INDICATED       LAMINECTOMY LUMBAR ONE LEVEL N/A 10/13/2015    Procedure: LAMINECTOMY LUMBAR ONE LEVEL;  Surgeon: Fransico Toussaint MD;  Location:  OR       Social History     Tobacco Use     Smoking status: Never Smoker      Smokeless tobacco: Never Used   Substance Use Topics     Alcohol use: Yes     Comment: rare wine      Family History   Problem Relation Age of Onset     Hypertension Mother      Psychotic Disorder Father      Diabetes Son      Diabetes Daughter      Blood Disease Daughter          Allergies   Allergen Reactions     Cephalexin Diarrhea and GI Disturbance     Other reaction(s): GI Upset       Cephalexin Hcl Diarrhea     Gabapentin Other (See Comments)     Dizzsiness  Shaky, dizzy, dry mouth  Dizzsiness  Shaky,dry mouth       Methotrexate Other (See Comments)     Depleted Folic Acid  And caused severe leg cramps  Severe leg cramps     Naproxen GI Disturbance, Other (See Comments) and Nausea     Constipation. Tolerates ibuprofen.       Perfume      Sulfa Drugs Shortness Of Breath     Throat swelling     Alprazolam Other (See Comments)     Dizziness      Lactase Other (See Comments) and Unknown     Per pt - pt has lactose intolerance and cannot drink milk. Pt can tolerate other dairy products.      Levofloxacin GI Disturbance     Macrobid [Nitrofurantoin Anhydrous]      Possibly related to lung disease      Nitrofurantoin      Possibly related to lung disease      Seasonal Allergies      Ciprofloxacin Itching and Rash     Recent Labs   Lab Test 09/20/19  0848 09/05/19  1007  06/20/19  0919  02/11/19  1225 02/11/19  0625  01/18/19  0447  11/28/18  1558  08/09/18  0958  08/30/17  1214   LDL  --  120*  --   --   --   --   --   --   --   --   --   --  87  --  76   HDL  --  52  --   --   --   --   --   --   --   --   --   --  59  --  50   TRIG  --  120  --   --   --   --   --   --   --   --   --   --  51  --  101   ALT  --   --   --  43  --  16 17   < >  --    < > 21   < >  --    < >  --    CR 1.48* 1.58*   < > 1.71*   < > 1.71* 1.85*   < > 1.55*   < > 1.69*   < > 1.38*   < >  --    GFRESTIMATED 31* 29*   < > 26*   < > 26* 24*   < > 30*   < > 29*   < > 36*   < >  --    GFRESTBLACK 36* 33*   < > 30*   < > 31* 28*   < > 34*  "  < > 35*   < > 44*   < >  --    POTASSIUM 3.7 4.7   < >  --    < > 3.8 3.8   < > 4.1   < > 3.8   < > 5.5*   < >  --    TSH  --   --   --   --   --   --   --   --  0.73  --  0.87   < >  --    < > 0.77    < > = values in this interval not displayed.      BP Readings from Last 3 Encounters:   09/23/19 114/73   09/20/19 93/50   09/19/19 128/74    Wt Readings from Last 3 Encounters:   09/23/19 73.5 kg (162 lb)   09/20/19 72.9 kg (160 lb 11.2 oz)   09/17/19 71.7 kg (158 lb)                      Reviewed and updated as needed this visit by Provider         Review of Systems   ROS COMP: CONSTITUTIONAL: NEGATIVE for fever, chills, change in weight  INTEGUMENTARY/SKIN: NEGATIVE for worrisome rashes, moles or lesions  EYES: NEGATIVE for vision changes or irritation  ENT/MOUTH: NEGATIVE for ear, mouth and throat problems  RESP: NEGATIVE for significant cough or SOB  BREAST: NEGATIVE for masses, tenderness or discharge  CV: NEGATIVE for chest pain, palpitations or peripheral edema  GI: NEGATIVE for nausea, abdominal pain, heartburn, or change in bowel habits   female:As above.  MUSCULOSKELETAL: NEGATIVE for significant arthralgias or myalgia  NEURO: NEGATIVE for weakness, dizziness or paresthesias  ENDOCRINE: NEGATIVE for temperature intolerance, skin/hair changes  HEME: NEGATIVE for bleeding problems  PSYCHIATRIC: NEGATIVE for changes in mood or affect      Objective    /74 (BP Location: Left arm, Patient Position: Chair, Cuff Size: Adult Regular)   Pulse 74   Temp 98.3  F (36.8  C) (Oral)   Resp 16   Ht 1.626 m (5' 4\")   Wt 71.7 kg (158 lb)   LMP  (LMP Unknown)   SpO2 97%   BMI 27.12 kg/m    LMP  (LMP Unknown)   There is no height or weight on file to calculate BMI.  Physical Exam   GENERAL: healthy, alert and no distress  EYES: Eyes grossly normal to inspection, PERRL and conjunctivae and sclerae normal  HENT: ear canals and TM's normal, nose and mouth without ulcers or lesions  NECK: no adenopathy, no " asymmetry, masses, or scars and thyroid normal to palpation  RESP: lungs clear to auscultation - no rales, rhonchi or wheezes  CV: regular rate and rhythm, normal S1 S2, no S3 or S4, no murmur, click or rub, no peripheral edema and peripheral pulses strong  ABDOMEN: soft, nontender, no hepatosplenomegaly, no masses and bowel sounds normal  MS: no gross musculoskeletal defects noted, no edema  SKIN: no suspicious lesions or rashes  NEURO: Normal strength and tone, mentation intact and speech normal  PSYCH: mentation appears normal, affect normal/bright    Diagnostic Test Results:  Results for orders placed or performed in visit on 09/05/19   Lipid panel reflex to direct LDL Non-fasting   Result Value Ref Range    Cholesterol 196 <200 mg/dL    Triglycerides 120 <150 mg/dL    HDL Cholesterol 52 >49 mg/dL    LDL Cholesterol Calculated 120 (H) <100 mg/dL    Non HDL Cholesterol 144 (H) <130 mg/dL   UA with Microscopic reflex to Culture   Result Value Ref Range    Color Urine Yellow     Appearance Urine Slightly Cloudy     Glucose Urine Negative NEG^Negative mg/dL    Bilirubin Urine Negative NEG^Negative    Ketones Urine Negative NEG^Negative mg/dL    Specific Gravity Urine <=1.005 1.003 - 1.035    pH Urine 7.0 5.0 - 7.0 pH    Protein Albumin Urine Negative NEG^Negative mg/dL    Urobilinogen Urine 0.2 0.2 - 1.0 EU/dL    Nitrite Urine Negative NEG^Negative    Blood Urine Negative NEG^Negative    Leukocyte Esterase Urine Large (A) NEG^Negative    Source Midstream Urine     WBC Urine >100 (A) OTO5^0 - 5 /HPF    RBC Urine O - 2 OTO2^O - 2 /HPF    Squamous Epithelial /LPF Urine Moderate (A) FEW^Few /LPF    Bacteria Urine Few (A) NEG^Negative /HPF   Urine Culture Aerobic Bacterial   Result Value Ref Range    Specimen Description Midstream Urine     Culture Micro >100,000 colonies/mL  Enterococcus faecalis   (A)        Susceptibility    Enterococcus faecalis - JOSE     AMPICILLIN <=2 Sensitive ug/mL     NITROFURANTOIN <=16  "Sensitive ug/mL     PENICILLIN 8 Sensitive ug/mL     VANCOMYCIN 1 Sensitive ug/mL     *Note: Due to a large number of results and/or encounters for the requested time period, some results have not been displayed. A complete set of results can be found in Results Review.           Assessment & Plan     1. Enterococcus UTI  Based on urine culture last 9/5/2019.  Treatment options limited since patient is allergic to penicillin (ideal drug is Ampicillin), Sulfa and Nitrofurantoin.  -Treatment with Doxycycline was helpful.    2. Urinary retention  Presumably from bladder spasm.    3. Renal insufficiency  Most likely due to pre-renal azotemia.  - Comprehensive metabolic panel (BMP + Alb, Alk Phos, ALT, AST, Total. Bili, TP); Future    4. Need for immunization against influenza  NO contraindications.  - FLU VACCINE, INCREASED ANTIGEN, PRESV FREE; Future     BMI:   Estimated body mass index is 27.12 kg/m  as calculated from the following:    Height as of this encounter: 1.626 m (5' 4\").    Weight as of this encounter: 71.7 kg (158 lb).   Weight management plan: dietary changes.        FUTURE APPOINTMENTS:       - Follow-up visit in 4 weeks or earlier as needed.      Tre Lockett MD  [facility]        "

## 2019-09-17 NOTE — TELEPHONE ENCOUNTER
Called patient's son Arjun and advised that an appointment was held for patient at [facility] [place] with Dr Lockett for follow up ER visit today, appointment information provided. He will make this appointment as a start for patient's post ER follow ups. Arjun will also call [facility] to follow up with them. Arjun is agreeable with plan and will call with any other concerns. Appointment with Dr Martins scheduled for this Thursday 9/19/19.Kelley Chowdhury RN

## 2019-09-17 NOTE — TELEPHONE ENCOUNTER
I reviewed with Karla Mabry NP and called Arjun back. Per Arjun PCP did not mention if she should continue increased dose of Lasix, though he reports he did say if she continued the Lasix at 60 daily, he would suggest doing it as 20 mg TID as it is shorter acting.   Arjun notes his mom seems to be improving with the swelling in her ankles and her weight was trending down. We discussed as long as she seemed to be improving continuing the lasix at 20 mg BID and Arjun said he had discussed with Karla last week if they felt they needed to they could increase to 40 mg/20 mg for a couple days.     They will get labs on Friday to recheck kidney function and we tentatively set up a follow up [facility] appointment for Monday 9/23.     I told Arjun I would review this with Karla and if she recommends anything else I would call him back.   Lizett Zapata RN

## 2019-09-17 NOTE — TELEPHONE ENCOUNTER
I called Arjun back. iLnda was in [place] ER yesterday for urinary retention and some shortness of breath. Had not been able to void for over 12 hours. Recent UTI and on antibiotics. Had a mcnulty cath placed in ER which is still in place.   Seeing PCP today. Per report she was told to increase Lasix to 40 mg in the morning, 20 mg in the PM from 20 mg BID. Her creatinine was elevated to 1.99. She reports she still feels more short of breath. Her weight was 155.2 this morning. Down 0.6 lbs from yesterday. Reports a week ago her weight was 154.4.   Swelling in her legs was bad yesterday but is improving today.     She is looking for direction on her lasix dosing. Reports she only took 20 mg this morning until could get further instructions from us.   I told her I would review with Karla Mabry NP and call her back.     Lizett Zapata RN

## 2019-09-18 RX ORDER — LEVOTHYROXINE SODIUM 75 UG/1
TABLET ORAL
Qty: 90 TABLET | Refills: 0 | Status: SHIPPED | OUTPATIENT
Start: 2019-09-18 | End: 2019-12-15

## 2019-09-18 NOTE — TELEPHONE ENCOUNTER
Routing refill request to provider for review/approval because:    Patient was seen in clinic yesterday so will route to provider for review.    Regla Escalona RN

## 2019-09-19 ENCOUNTER — OFFICE VISIT (OUTPATIENT)
Dept: UROLOGY | Facility: CLINIC | Age: 84
End: 2019-09-19
Payer: MEDICARE

## 2019-09-19 ENCOUNTER — TELEPHONE (OUTPATIENT)
Dept: CARDIOLOGY | Facility: CLINIC | Age: 84
End: 2019-09-19

## 2019-09-19 VITALS — DIASTOLIC BLOOD PRESSURE: 74 MMHG | HEART RATE: 75 BPM | SYSTOLIC BLOOD PRESSURE: 128 MMHG | OXYGEN SATURATION: 98 %

## 2019-09-19 DIAGNOSIS — R33.9 URINARY RETENTION: Primary | ICD-10-CM

## 2019-09-19 PROCEDURE — 99214 OFFICE O/P EST MOD 30 MIN: CPT | Performed by: UROLOGY

## 2019-09-19 NOTE — PATIENT INSTRUCTIONS
Trial of Void Post Care Instructions    Drink 6 to 8 glasses of water a day for the next couple of days.  Let your bladder fill naturally.   Use the bathroom when you have the urge to urinate.  If you do not urinate in 6 hours, please call [phone] (urology clinic) between 8-5pm.  For after hours/weekend questions or concerns, please call the on-call urologist at the [facility] at [phone].

## 2019-09-19 NOTE — NURSING NOTE
Linda Spicer's goals for this visit include:   Chief Complaint   Patient presents with     Follow Up     ER follow up. Would like catheter removed.       She requests these members of her care team be copied on today's visit information:     PCP: Tre Lockett    Referring Provider:  No referring provider defined for this encounter.    /74 (BP Location: Left arm, Patient Position: Sitting, Cuff Size: Adult Regular)   Pulse 75   LMP  (LMP Unknown)   SpO2 98%     Do you need any medication refills at today's visit? No    Asia Bryan LPN

## 2019-09-19 NOTE — TELEPHONE ENCOUNTER
Patient has dental appointment next week She states that her dentist has changed and they are asking if she needs an antibiotic prior to her appt. She has not needed one in the past. Reviewed antibiotic prophylaxis conditions of prosthetic cardiac valve, previous endocarditis, and congenital disease.   She would like me to double check with Dr Loza and let her know.   Will do so    BRANDI Berger

## 2019-09-19 NOTE — TELEPHONE ENCOUNTER
Called and spoke to patient. She has not had a valve replaced, no congenital disease, no endocarditis. She does not need an antibiotic prophylaxis. See Dr Loza's comments below.  BRANDI Berger      In regard to your question, I have placed the updated IE prophylaxis guidelines below.  I know she had a prior MitraClip but I do not believe she has had a TAVR (that would be indication) and I do not think she has Hx of congenital heart disease.   Please confirm that she has no history of endocarditis and if she has no history, there is NO reason she should need antibiotic prophylaxis.     Emeterio Loza MD     Antibiotic prophylaxis with dental procedures is reasonable for patients with cardiac conditions associated with the highest risk of adverse outcomes from endocarditis, including:       Prosthetic cardiac valves, including transcatheter-implanted prostheses and homografts       Prosthetic material used for cardiac valve repair, such as annuloplasty rings and chords       Previous endocarditis       Congenital heart disease (CHD) only in the following categories:*    - Unrepaired cyanotic CHD, including those with palliative shunts and conduits    - Completely repaired congenital heart defect with prosthetic material or device, whether placed by surgery or catheter intervention, during the first six months after the procedure     - Repaired CHD with residual shunts or valvular regurgitation at the site or adjacent to the site of a prosthetic patch or prosthetic device (which inhibit endothelialization)       Cardiac transplantation recipients with valve regurgitation due to a structurally abnormal valve

## 2019-09-19 NOTE — NURSING NOTE
Trial of void performed as ordered by Dr. Martins. Instilled 250 ml of normal saline via mcnulty catheter. Patient expressed fullness and urgency.  10cc balloon deflated, catheter removed with out difficulty. Patient voided 100ml and had excessive leakage prior to voiding. Unable to complete post void bladder scan due to machine being out of commision. Pt scheduled for nurse only PVR visit on 10/4/19.  Patient tolerated procedure without difficulty. Results reported to Dr. Martins via verbal. Patient okay to be sent home without catheter.  Teaching done with patient verbally as to where to go or call if unable to urinate post-catheter removal.    Minna Armenta LPN

## 2019-09-19 NOTE — PROGRESS NOTES
[facility] [place]  CHIE[facility]PLA[facility]   It was my pleasure to see Linda Spicer who is a 88 year old female for follow-up of acute urinary retention .      HPI   Linda Spicer is a very pleasant 88 year old female who presents with a history of recurrent UTIs with recent episode of acute urinary retention. She previously followed with Asia Harp PA-C.    Last seen 2/15/19:  \"HPI: Ms. Linda Spicer is an 87 year old female who returns to the urology clinic for follow up of recurrent UTI's. Seen in initial consultation on 3/2/18 - please see consult note from this date for full history. In brief, she has had issues with frequent UTI's for a few years, but they have been worsening since November 2017. She reports having 3 infections from November to March 2018 (see below). Of note, she typically does not have any symptoms when she gets diagnosed with a UTI - she just requests urine samples to be checked when she is in clinic which then come back positive. She has never had a febrile UTI or been hospitalized for UTI's. She was started on daily low dose Cipro by her PCP in the Spring of 2017 which worked well to reduce her infections but caused a rash so she eventually stopped it. At an office visit on 3/2/18, her PVR was noted to be somewhat elevated to 202 mL. She was therefore encouraged to work on double voiding. Also started on vaginal estrogen cream and recommended to not check urine samples unless she has urinary symptoms concerning for a possible infection. Otherwise, this may just be asymptomatic bacteriuria which does not necessarily require treatment. At a follow up visit on 6/1/18, she was doing well with no further infections since starting vaginal estrogen cream. PVR was still elevated to 260 mL at that time so she was offered instruction in CIC but declined. She elected to continue double voiding. At another follow up appointment on 11/91/8, she reported 3 new UTI's in the preceding 2 months (all culture " "confirmed - see below). Symptoms with these infections included strong odor, hesitancy, and leakage. As a result, she was started on daily UTI prophylaxis with trimethoprim 50 mg. She returns today for follow up and is doing well overall from a urologic standpoint. She has been hospitalized numerous times over the past few months for cardiopulmonary issues but is now back home and starting to regain her strength. She had 2 additional UTI's close together in November 2018 (may have been the same infection not entirely cleared - see below) but no infections since then. She is still using the vaginal estrogen cream twice weekly and taking 50 mg of trimethoprim once nightly.     REVIEW OF DIAGNOSTICS:   1/18/19 - UC: No growth  11/28/18 - UA: negative  11/23/18 - UC: No growth  11/17/18 - UC: 50-100K Enterococcus faecalis  11/9/18 - UC: 50-100K Enterococcus faecalis  10/30/18 - UC: >100K Klebsiella pneumoniae  10/11/18 - UC: >100K Citrobacter freundii complex  9/27/18 - UC: >100K Klebsiella pneumoniae  7/31/18 - UA: negative  7/26/18 - UA: negative  5/14/18 - UA: negative  2/13/18 - UA: negative --> UC: 10-50K coag neg Staph, >100K mixed  osvaldo  1/22/18 - UA: negative  1/15/18 - UA: negative  1/4/18 - UA: large blood, large LE, >100 WBC, 10-25 RBC, few bacteria --> UC: 10-50K mixed  osvaldo  12/4/17 -  UA: negative  11/27/17 - UA: trace LE, 0-2 WBC, 0-2 RBC, few bacteria  11/25/17 - UA: negative  11/21/17 - UA: small blood, large LE, >100 WBC, 2-5 RBC, many bacteria --> UC: >100K Citrobacter amalonaticus  8/30/17 - UA: negative  7/21/17 - UA: negative  6/15/17 - UA: negative  2/3/17 - UA: negative\"    Last PVR with Asia 18cc.     Recent UTI with UCx from 9/5/19 >100k Enterococcus faecalis which has been treated. She then developed acute urinary retention on 9/16/19 and a mcnulty catheter was placed. She presents for management of this.   She reports doing well with her bowel movements and she continues on Estrace. "     PHYSICAL EXAM  Patient is a 88 year old  female   Vitals: Blood pressure 128/74, pulse 75, SpO2 98 %, not currently breastfeeding.  General Appearance Adult: There is no height or weight on file to calculate BMI.  Alert, no acute distress, oriented  HENT: throat/mouth:normal, good dentition  Lungs: no respiratory distress, or pursed lip breathing  Heart: No obvious jugular venous distension present  Abdomen: soft, nontender, no organomegaly or masses  Musculoskeltal: extremities normal, no peripheral edema  Skin: no suspicious lesions or rashes  Neuro: Alert, oriented, speech and mentation normal  Psych: affect and mood normal  Gait: Normal    UA RESULTS:  Recent Labs   Lab Test 09/05/19  1054   COLOR Yellow   APPEARANCE Slightly Cloudy   URINEGLC Negative   URINEBILI Negative   URINEKETONE Negative   SG <=1.005   UBLD Negative   URINEPH 7.0   PROTEIN Negative   UROBILINOGEN 0.2   NITRITE Negative   LEUKEST Large*   RBCU O - 2   WBCU >100*          All pertinent imaging reviewed:    CT ABDOMEN PELVIS W/O CONTRAST, 11/17/2018   Bilateral renal cortical atrophy, not significantly changed. No  significant hydronephrosis or hydroureter. Small amount of gas within  the bladder. No significant bladder wall thickening. Mild prominent  fat of the left inguinal canal. Postoperative changes of hysterectomy.     US RENAL COMPLETE WITH DOPPLER COMPLETE   1/16/2019   IMPRESSION: Normal renal ultrasound. Normal Doppler evaluation of the  kidneys.    ASSESSMENT and PLAN  88 year old female with history of recurrent UTI and now recent episode of acute urinary retention    - Trial of void today, which she passed  - We discussed that she has had history of elevated PVR and we again discussed the option for intermittent self catheterization teaching. She would like to continue to think about this option  - We discussed that she should remain on her abx prophylaxis  - F/U in 2 weeks to recheck a PVR      I spent over 25 minutes  with the patient.  Over half this time was spent on counseling regarding the above.    Lei Martins MD   Urology  [facility]  Clinic Phone [phone]

## 2019-09-20 ENCOUNTER — INFUSION THERAPY VISIT (OUTPATIENT)
Dept: INFUSION THERAPY | Facility: CLINIC | Age: 84
End: 2019-09-20
Payer: MEDICARE

## 2019-09-20 VITALS
OXYGEN SATURATION: 99 % | TEMPERATURE: 98.1 F | SYSTOLIC BLOOD PRESSURE: 93 MMHG | HEART RATE: 54 BPM | DIASTOLIC BLOOD PRESSURE: 50 MMHG | RESPIRATION RATE: 16 BRPM | WEIGHT: 160.7 LBS | BODY MASS INDEX: 27.58 KG/M2

## 2019-09-20 DIAGNOSIS — I50.33 ACUTE ON CHRONIC DIASTOLIC HEART FAILURE (H): ICD-10-CM

## 2019-09-20 DIAGNOSIS — M05.79 RHEUMATOID ARTHRITIS INVOLVING MULTIPLE SITES WITH POSITIVE RHEUMATOID FACTOR (H): Primary | ICD-10-CM

## 2019-09-20 LAB
ANION GAP SERPL CALCULATED.3IONS-SCNC: 5 MMOL/L (ref 3–14)
BUN SERPL-MCNC: 41 MG/DL (ref 7–30)
CALCIUM SERPL-MCNC: 8.7 MG/DL (ref 8.5–10.1)
CHLORIDE SERPL-SCNC: 103 MMOL/L (ref 94–109)
CO2 SERPL-SCNC: 32 MMOL/L (ref 20–32)
CREAT SERPL-MCNC: 1.48 MG/DL (ref 0.52–1.04)
GFR SERPL CREATININE-BSD FRML MDRD: 31 ML/MIN/{1.73_M2}
GLUCOSE SERPL-MCNC: 111 MG/DL (ref 70–99)
POTASSIUM SERPL-SCNC: 3.7 MMOL/L (ref 3.4–5.3)
SODIUM SERPL-SCNC: 140 MMOL/L (ref 133–144)

## 2019-09-20 PROCEDURE — 96365 THER/PROPH/DIAG IV INF INIT: CPT | Performed by: INTERNAL MEDICINE

## 2019-09-20 PROCEDURE — 80048 BASIC METABOLIC PNL TOTAL CA: CPT | Performed by: NURSE PRACTITIONER

## 2019-09-20 PROCEDURE — 36415 COLL VENOUS BLD VENIPUNCTURE: CPT | Performed by: NURSE PRACTITIONER

## 2019-09-20 PROCEDURE — 99207 ZZC NO CHARGE LOS: CPT

## 2019-09-20 ASSESSMENT — PAIN SCALES - GENERAL: PAINLEVEL: NO PAIN (0)

## 2019-09-20 NOTE — PROGRESS NOTES
Infusion Nursing Note:  Linda Spicer presents today for Orencia.    Patient seen by provider today: No   present during visit today: Not Applicable.    Note: N/A.    Intravenous Access:  Peripheral IV placed.    Treatment Conditions:  Biological Infusion Checklist:  ~~~ NOTE: If the patient answers yes to any of the questions below, hold the infusion and contact ordering provider or on-call provider.    1. Have you recently had an elevated temperature, fever, chills, productive cough, coughing for 3 weeks or longer or hemoptysis, abnormal vital signs, night sweats,  chest pain or have you noticed a decrease in your appetite, unexplained weight loss or fatigue? No  2. Do you have any open wounds or new incisions? No  3. Do you have any recent or upcoming hospitalizations, surgeries or dental procedures? No  4. Do you currently have or recently have had any signs of illness or infection or are you on any antibiotics? Yes, UTI previously  5. Have you had any new, sudden or worsening abdominal pain? No  6. Have you or anyone in your household received a live vaccination in the past 4 weeks? Please note:  No live vaccines while on biologic/chemotherapy until 6 months after the last treatment.  Patient can receive the flu vaccine (shot only) and the pneumovax.  It is optimal for the patient to get these vaccines mid cycle, but they can be given at any time as long as it is not on the day of the infusion. No  7. Have you recently been diagnosed with any new nervous system diseases (ie. Multiple sclerosis, Guillain [place], seizures, neurological changes) or cancer diagnosis? No  8. Are you on any form of radiation or chemotherapy? No  9. Are you pregnant or breast feeding or do you have plans of pregnancy in the future? No  10. Have you been having any signs of worsening depression or suicidal ideations?  (benlysta only) No  11. Have there been any other new onset medical symptoms? No    Post Infusion  Assessment:  Patient tolerated infusion without incident.  Site patent and intact, free from redness, edema or discomfort.  No evidence of extravasations.  Access discontinued per protocol.       Discharge Plan:   Patient will return 10/17 for next appointment.   Patient discharged in stable condition accompanied by: self.  Departure Mode: Ambulatory.    Trinity Varela, RN, BSN

## 2019-09-23 ENCOUNTER — OFFICE VISIT (OUTPATIENT)
Dept: CARDIOLOGY | Facility: CLINIC | Age: 84
End: 2019-09-23
Payer: MEDICARE

## 2019-09-23 VITALS
OXYGEN SATURATION: 97 % | HEART RATE: 67 BPM | DIASTOLIC BLOOD PRESSURE: 73 MMHG | SYSTOLIC BLOOD PRESSURE: 114 MMHG | BODY MASS INDEX: 27.81 KG/M2 | WEIGHT: 162 LBS

## 2019-09-23 DIAGNOSIS — I48.19 PERSISTENT ATRIAL FIBRILLATION (H): ICD-10-CM

## 2019-09-23 DIAGNOSIS — I50.33 ACUTE ON CHRONIC DIASTOLIC HEART FAILURE (H): Primary | ICD-10-CM

## 2019-09-23 DIAGNOSIS — N39.0 RECURRENT UTI: ICD-10-CM

## 2019-09-23 DIAGNOSIS — N18.4 CKD (CHRONIC KIDNEY DISEASE) STAGE 4, GFR 15-29 ML/MIN (H): ICD-10-CM

## 2019-09-23 DIAGNOSIS — I49.5 TACHY-BRADY SYNDROME (H): ICD-10-CM

## 2019-09-23 DIAGNOSIS — Z98.890 S/P MITRAL VALVE CLIP IMPLANTATION: ICD-10-CM

## 2019-09-23 DIAGNOSIS — I49.5 SSS (SICK SINUS SYNDROME) (H): ICD-10-CM

## 2019-09-23 DIAGNOSIS — Z95.818 S/P MITRAL VALVE CLIP IMPLANTATION: ICD-10-CM

## 2019-09-23 PROCEDURE — 99214 OFFICE O/P EST MOD 30 MIN: CPT | Performed by: NURSE PRACTITIONER

## 2019-09-23 RX ORDER — TRIMETHOPRIM 100 MG/1
50 TABLET ORAL DAILY
Qty: 45 TABLET | Refills: 0 | Status: ON HOLD | OUTPATIENT
Start: 2019-09-23 | End: 2019-11-26

## 2019-09-23 RX ORDER — FUROSEMIDE 20 MG
TABLET ORAL
Qty: 180 TABLET | Refills: 3 | Status: SHIPPED | OUTPATIENT
Start: 2019-09-23 | End: 2019-11-05

## 2019-09-23 RX ORDER — POTASSIUM CHLORIDE 1500 MG/1
40 TABLET, EXTENDED RELEASE ORAL DAILY
Qty: 180 TABLET | Refills: 3 | Status: SHIPPED | OUTPATIENT
Start: 2019-09-23 | End: 2019-11-29

## 2019-09-23 NOTE — NURSING NOTE
Diet: Patient instructed regarding a heart healthy diet, including discussion of reduced fat and sodium intake. Patient demonstrated understanding of this information and agreed to call with further questions or concerns.  Labs: Patient was given results of the laboratory testing obtained today. Patient was instructed to return for the next laboratory testing in 1 week. Patient demonstrated understanding of this information and agreed to call with further questions or concerns.   Return Appointment: Patient given instructions regarding scheduling next clinic visit. Patient demonstrated understanding of this information and agreed to call with further questions or concerns.  I will call Linda on Wednesday 9/25 to see how she is feeling.   Medication Change: Patient was educated regarding prescribed medication change, including discussion of the indication, administration, side effects, and when to report to MD or RN. Patient demonstrated understanding of this information and agreed to call with further questions or concerns.  Patient stated she understood all health information given and agreed to call with further questions or concerns.   Lizett Zapata RN

## 2019-09-23 NOTE — PROGRESS NOTES
HPI: Linda is an 88-year-old female with a past medical history of CKD, HTN, anemia, pulmonary fibrosis, tachy/bradycardia syndrome s/p permanent pacemaker placement, atrial flutter, PAF, mild to moderate MR s/p mitral clip placement, and HFpEF who returns [place] [facility] [place].       Over the past week, Linda has been feeling terrible.  She has noticed more swelling in her lower extremities, weight gain, and shortness of breath.  She is able to walk a couple of feet before becoming short of breath. She notices SOB after prolonged conversations and feels more fatigued. Her abdomen feels more distended.  She denies any PND or orthopnea.  She feels her symptoms started after eating chicken wings and more sodium rich foods.      PAST MEDICAL HISTORY:  Past Medical History:   Diagnosis Date     Adjustment disorder with anxious mood 5/18/2015     Advanced directives, counseling/discussion 8/30/2012    Patient states has Advance Directive and will bring in a copy to clinic. 8/30/2012   Tevin May  [facility] Medical Assistant \       Anemia 9/25/2015     Basal cell cancer      CHF (congestive heart failure) (H) 9/18/2014     CKD (chronic kidney disease) stage 3, GFR 30-59 ml/min (H) 9/29/2015     DDD (degenerative disc disease), lumbar 9/25/2015     Diffuse idiopathic pulmonary fibrosis (H) 5/6/2013     Encounter for palliative care 5/18/2015     History of blood transfusion 9/29/2015     Hypertension goal BP (blood pressure) < 140/90 9/7/2012     Hypothyroid 9/7/2012     Irritable bowel syndrome 10/29/2013     Macular degeneration      Macular degeneration, 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     Nondisplaced spiral fracture of shaft of humerus      Osteoporosis 8/13/2013     Imo Update utility     RA (rheumatoid arthritis) (H) 5/7/2013     Rheumatic fever      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 2/20/2019     Shingles      Spinal stenosis of lumbar region with neurogenic claudication 9/14/2015       FAMILY HISTORY:  Family  History   Problem Relation Age of Onset     Hypertension Mother      Psychotic Disorder Father      Diabetes Son      Diabetes Daughter      Blood Disease Daughter        SOCIAL HISTORY:  Social History     Socioeconomic History     Marital status:      Spouse name: None     Number of children: None     Years of education: None     Highest education level: None   Occupational History     None   Social Needs     Financial resource strain: None     Food insecurity:     Worry: None     Inability: None     Transportation needs:     Medical: None     Non-medical: None   Tobacco Use     Smoking status: Never Smoker     Smokeless tobacco: Never Used   Substance and Sexual Activity     Alcohol use: Yes     Comment: rare wine      Drug use: No     Sexual activity: Never   Lifestyle     Physical activity:     Days per week: 0 days     Minutes per session: 0 min     Stress: None   Relationships     Social connections:     Talks on phone: None     Gets together: None     Attends Worship service: None     Active member of club or organization: None     Attends meetings of clubs or organizations: None     Relationship status: None     Intimate partner violence:     Fear of current or ex partner: None     Emotionally abused: None     Physically abused: None     Forced sexual activity: None   Other Topics Concern     Parent/sibling w/ CABG, MI or angioplasty before 65F 55M? No   Social History Narrative    . Has six children. She enjoys bridge and genealogy.  passed away.  Her son has financial and alcohol issues.       CURRENT MEDICATIONS:  Current Outpatient Medications   Medication Sig Dispense Refill     apixaban ANTICOAGULANT (ELIQUIS ANTICOAGULANT) 2.5 MG tablet Take 1 tablet (2.5 mg) by mouth 2 times daily 180 tablet 3     apixaban ANTICOAGULANT (ELIQUIS) 2.5 MG tablet Take 1 tablet (2.5 mg) by mouth 2 times daily 60 tablet 9     azaTHIOprine (IMURAN) 50 MG tablet Take 1 tablet (50 mg) by mouth daily  90 tablet 2     carvedilol (COREG) 3.125 MG tablet Take 1 tablet (3.125 mg) by mouth 2 times daily (with meals) 60 tablet 0     COMPOUNDED NON-CONTROLLED SUBSTANCE (CMPD RX) - PHARMACY TO MIX COMPOUNDED MEDICATION Estriol 1mg/gram. Place 1 gram vaginally daily for 2 weeks. Then vaginally twice weekly 30 g 6     denosumab (PROLIA) 60 MG/ML SOLN injection Inject 1 mL (60 mg) Subcutaneous every 6 months 1 mL      furosemide (LASIX) 20 MG tablet Take 1 tablet (20 mg) by mouth 2 times daily 180 tablet 3     hydrALAZINE (APRESOLINE) 10 MG tablet Take 1 tablet (10 mg) by mouth 3 times daily 90 tablet 3     isosorbide mononitrate (IMDUR) 60 MG 24 hr tablet Take 1 tablet (60 mg) by mouth daily 90 tablet 1     levothyroxine (SYNTHROID/LEVOTHROID) 75 MCG tablet TAKE 1 TABLET BY MOUTH EVERY DAY 90 tablet 0     nitroGLYcerin (NITROSTAT) 0.4 MG sublingual tablet Place 1 tablet (0.4 mg) under the tongue every 5 minutes as needed for chest pain 25 tablet 11     order for DME Dispense SP Walker-small 1 each 0     order for DME Equipment being ordered: Dispense baffle, for use with nebulizer. 1 each 0     order for DME Equipment being ordered: Nebulizer. Use with Albuterol. 1 each 0     order for DME Equipment being ordered: Dispense face mask.  Mrs. Spicer is immunosuppressed due to rheumatoid arthritis. 1 Box 11     senna-docusate (SENOKOT-S/PERICOLACE) 8.6-50 MG tablet Take 1 tablet by mouth daily as needed for constipation 100 tablet 3     trimethoprim (TRIMPEX) 100 MG tablet Take 0.5 tablets (50 mg) by mouth daily 45 tablet 0     valACYclovir (VALTREX) 1000 mg tablet Take 1 tablet (1,000 mg) by mouth 2 times daily 14 tablet 2       ROS:  Constitutional: Denies fever, chills, or diaphoresis.  +weight gain and fatigue.  Respiratory:  See HPI.     Cardiovascular: As per HPI.   GI: Denies nausea, vomiting, diarrhea, hematemesis, melena, or hematochezia.   : See HPI   Integument: Negative for bruising, rash, or  pruritis.  Psychiatric: +anxiety and depression  Neuro: Negative for headaches, syncope, numbness or tingling.   Endocrinology:  +Hypothyroidism  Musculoskeletal:  +gait instability    EXAM:  /73 (BP Location: Right arm, Patient Position: Chair, Cuff Size: Adult Large)   Pulse 67   Wt 73.5 kg (162 lb)   LMP  (LMP Unknown)   SpO2 97%   BMI 27.81 kg/m    General: alert, articulate, breathing mildly labored at rest.   HEENT: normocephalic, atraumatic, anicteric sclera, EOMI, mucosa moist, no cyanosis.    Neck: neck supple.  JVP 12 cm with +HJR to mandible  Heart: Irregular rhythm, normal S1/S2, no murmur, gallop, rub.  Precordium quiet with normal PMI.     Lungs: Bibasilar crackles L>R. No ronchi or wheezing.  No accessory muscle use, respirations unlabored.   Abdomen: soft, non-tender, bowel sounds present, no hepatomegaly  Extremities:  2+ pitting edema.  No cyanosis.  Extremities warm to touch.  Neurological: Alert and oriented x 3.  Normal speech and affect, no gross motor deficits  Skin:  No ecchymoses, rashes, or clubbing.       Labs:  CBC RESULTS:  Lab Results   Component Value Date    WBC 5.8 07/05/2019    RBC 3.95 07/05/2019    HGB 12.3 07/05/2019    HCT 39.0 07/05/2019    MCV 99 07/05/2019    MCH 31.1 07/05/2019    MCHC 31.5 07/05/2019    RDW 14.5 07/05/2019     07/05/2019       CMP RESULTS:  Lab Results   Component Value Date     09/20/2019    POTASSIUM 3.7 09/20/2019    CHLORIDE 103 09/20/2019    CO2 32 09/20/2019    ANIONGAP 5 09/20/2019     (H) 09/20/2019    BUN 41 (H) 09/20/2019    CR 1.48 (H) 09/20/2019    GFRESTIMATED 31 (L) 09/20/2019    GFRESTBLACK 36 (L) 09/20/2019    FAOTUMATA 8.7 09/20/2019    BILITOTAL 0.4 06/20/2019    ALBUMIN 3.6 06/20/2019    ALKPHOS 67 06/20/2019    ALT 43 06/20/2019    AST 44 06/20/2019        INR RESULTS:  Lab Results   Component Value Date    INR 1.12 02/11/2019       No components found for: CK  Lab Results   Component Value Date    MAG 2.1  02/12/2019     Lab Results   Component Value Date    NTBNP 3,154 (H) 10/29/2018       Most recent echocardiogram 7/5/19:  Interpretation Summary  Left and right ventricular function are normal.The EF is 55-60%.  S/P MitraClip. The clip appears well-seated with capture of both mitral  leaflets. There is mild residual MR. The mean mitral inflow gradient is 8 mmHg  at 70 bpm.  Mild pulmonary hypertension. Pulmonary artery systolic pressure is 48 mmHg (45  mmHg+RA pressure).  Moderate tricuspid insufficiency is present.     Compared to prior TTE dated 3/13/19: Mitral inflow gradient has increased  slightly. There has otherwise been no significant change.    Assessment and Plan:  In summary, this is a very pleasant 88 year old with HFpEF who appears hypervolemic due to dietary indiscretion.  I have increased her furosemide to 40 mg in the morning and 20 mg in the evening and started potassium 40 mEq daily.      She will repeat a BMP in 1 week and we have scheduled a tentative CORE visit as well if needed to reassess her volume status. If her symptoms are not improved on the Furosemide 40/20, I instructed her to increase her furosemide to 40 mg twice daily.    1. HFpEF:   Stage C NYHA CLASS IIIb:  Symptoms with minimal activity, recent dyspnea at rest  BP: Controlled at 114/73.     Fluid status:  Hypervolemic  Aldosterone antagonist: contraindicated due to renal dysfunction  Ischemia evaluation: Negative stress test in 2014  ACEi/ARB -  N/a HFpEF  BB - Coreg 3.125 mg twice daily  SCD prophylaxis - N/A, EF is normal   NSAID use: Contraindicated, reviewed with patient   Sleep Apnea Evaluation: Refused sleep study evaluation  Remote Monitoring:  None    2.  Atrial Fibrillation:  Anticoagulation: Apixaban 2.5 mg twice daily    Rate control:  Coreg 3.125 mg twice daily.  ChadsVasc Score: 4  HR 67     3.  Tachy-Jadiel syndrome s/p PPM:  Followed by EP.  Device checks per protocol.     4.  CKD Stage IV:  Creatinine 1.48  today.  Baseline per record review is 1.4.         5. Mitral regurgitation:  S/p Mitraclip.  Followed by Dr. Loza.  Follow up with him per previous recommendations.      6.  Follow-up: CORE in one week if symptoms don't improve. Device in May 2020.  Dr. Loza this fall.      Karla MOELLER, KAVEH  [facility]

## 2019-09-23 NOTE — LETTER
9/23/2019      RE: Linda Spicer  [address]  [address]  [facility] [address]       Dear Colleague,    Thank you for the opportunity to participate in the care of your patient, Linda Spicer, at the [facility] HEART AT [facility]. Please see a copy of my visit note below.      HPI: Linda is an 88-year-old female with a past medical history of CKD, HTN, anemia, pulmonary fibrosis, tachy/bradycardia syndrome s/p permanent pacemaker placement, atrial flutter, PAF, mild to moderate MR s/p mitral clip placement, and HFpEF [place] [facility] [place]       Over the past week, Linda has been feeling terrible.  She has noticed more swelling in her lower extremities, weight gain, and shortness of breath.  She is able to walk a couple of feet before becoming short of breath. She notices SOB after prolonged conversations and feels more fatigued. Her abdomen feels more distended.  She denies any PND or orthopnea.  She feels her symptoms started after eating chicken wings and more sodium rich foods.      PAST MEDICAL HISTORY:  Past Medical History:   Diagnosis Date     Adjustment disorder with anxious mood 5/18/2015     Advanced directives, counseling/discussion 8/30/2012    Patient states has Advance Directive and will bring in a copy to clinic. 8/30/2012   Tevin May  [facility] Medical Assistant \       Anemia 9/25/2015     Basal cell cancer      CHF (congestive heart failure) (H) 9/18/2014     CKD (chronic kidney disease) stage 3, GFR 30-59 ml/min (H) 9/29/2015     DDD (degenerative disc disease), lumbar 9/25/2015     Diffuse idiopathic pulmonary fibrosis (H) 5/6/2013     Encounter for palliative care 5/18/2015     History of blood transfusion 9/29/2015     Hypertension goal BP (blood pressure) < 140/90 9/7/2012     Hypothyroid 9/7/2012     Irritable bowel syndrome 10/29/2013     Macular degeneration      Macular  degeneration, 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     Nondisplaced spiral fracture of shaft of humerus      Osteoporosis 8/13/2013     Imo Update utility     RA (rheumatoid arthritis) (H) 5/7/2013     Rheumatic fever      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 2/20/2019     Shingles      Spinal stenosis of lumbar region with neurogenic claudication 9/14/2015       FAMILY HISTORY:  Family History   Problem Relation Age of Onset     Hypertension Mother      Psychotic Disorder Father      Diabetes Son      Diabetes Daughter      Blood Disease Daughter        SOCIAL HISTORY:  Social History     Socioeconomic History     Marital status:      Spouse name: None     Number of children: None     Years of education: None     Highest education level: None   Occupational History     None   Social Needs     Financial resource strain: None     Food insecurity:     Worry: None     Inability: None     Transportation needs:     Medical: None     Non-medical: None   Tobacco Use     Smoking status: Never Smoker     Smokeless tobacco: Never Used   Substance and Sexual Activity     Alcohol use: Yes     Comment: rare wine      Drug use: No     Sexual activity: Never   Lifestyle     Physical activity:     Days per week: 0 days     Minutes per session: 0 min     Stress: None   Relationships     Social connections:     Talks on phone: None     Gets together: None     Attends Je[employer] witness service: None     Active member of club or organization: None     Attends meetings of clubs or organizations: None     Relationship status: None     Intimate partner violence:     Fear of current or ex partner: None     Emotionally abused: None     Physically abused: None     Forced sexual activity: None   Other Topics Concern     Parent/sibling w/ CABG, MI or angioplasty before 65F 55M? No   Social History Narrative    . Has six children. She enjoys bridge and genealogy.  passed away.  Her son has financial and alcohol issues.       CURRENT  MEDICATIONS:  Current Outpatient Medications   Medication Sig Dispense Refill     apixaban ANTICOAGULANT (ELIQUIS ANTICOAGULANT) 2.5 MG tablet Take 1 tablet (2.5 mg) by mouth 2 times daily 180 tablet 3     apixaban ANTICOAGULANT (ELIQUIS) 2.5 MG tablet Take 1 tablet (2.5 mg) by mouth 2 times daily 60 tablet 9     azaTHIOprine (IMURAN) 50 MG tablet Take 1 tablet (50 mg) by mouth daily 90 tablet 2     carvedilol (COREG) 3.125 MG tablet Take 1 tablet (3.125 mg) by mouth 2 times daily (with meals) 60 tablet 0     COMPOUNDED NON-CONTROLLED SUBSTANCE (CMPD RX) - PHARMACY TO MIX COMPOUNDED MEDICATION Estriol 1mg/gram. Place 1 gram vaginally daily for 2 weeks. Then vaginally twice weekly 30 g 6     denosumab (PROLIA) 60 MG/ML SOLN injection Inject 1 mL (60 mg) Subcutaneous every 6 months 1 mL      furosemide (LASIX) 20 MG tablet Take 1 tablet (20 mg) by mouth 2 times daily 180 tablet 3     hydrALAZINE (APRESOLINE) 10 MG tablet Take 1 tablet (10 mg) by mouth 3 times daily 90 tablet 3     isosorbide mononitrate (IMDUR) 60 MG 24 hr tablet Take 1 tablet (60 mg) by mouth daily 90 tablet 1     levothyroxine (SYNTHROID/LEVOTHROID) 75 MCG tablet TAKE 1 TABLET BY MOUTH EVERY DAY 90 tablet 0     nitroGLYcerin (NITROSTAT) 0.4 MG sublingual tablet Place 1 tablet (0.4 mg) under the tongue every 5 minutes as needed for chest pain 25 tablet 11     order for DME Dispense SP Walker-small 1 each 0     order for DME Equipment being ordered: Dispense baffle, for use with nebulizer. 1 each 0     order for DME Equipment being ordered: Nebulizer. Use with Albuterol. 1 each 0     order for DME Equipment being ordered: Dispense face mask.  Mrs. Spicer is immunosuppressed due to rheumatoid arthritis. 1 Box 11     senna-docusate (SENOKOT-S/PERICOLACE) 8.6-50 MG tablet Take 1 tablet by mouth daily as needed for constipation 100 tablet 3     trimethoprim (TRIMPEX) 100 MG tablet Take 0.5 tablets (50 mg) by mouth daily 45 tablet 0     valACYclovir  (VALTREX) 1000 mg tablet Take 1 tablet (1,000 mg) by mouth 2 times daily 14 tablet 2       ROS:  Constitutional: Denies fever, chills, or diaphoresis.  +weight gain and fatigue.  Respiratory:  See HPI.     Cardiovascular: As per HPI.   GI: Denies nausea, vomiting, diarrhea, hematemesis, melena, or hematochezia.   : See HPI   Integument: Negative for bruising, rash, or pruritis.  Psychiatric: +anxiety and depression  Neuro: Negative for headaches, syncope, numbness or tingling.   Endocrinology:  +Hypothyroidism  Musculoskeletal:  +gait instability    EXAM:  /73 (BP Location: Right arm, Patient Position: Chair, Cuff Size: Adult Large)   Pulse 67   Wt 73.5 kg (162 lb)   LMP  (LMP Unknown)   SpO2 97%   BMI 27.81 kg/m     General: alert, articulate, breathing mildly labored at rest.   HEENT: normocephalic, atraumatic, anicteric sclera, EOMI, mucosa moist, no cyanosis.    Neck: neck supple.  JVP 12 cm with +HJR to mandible  Heart: Irregular rhythm, normal S1/S2, no murmur, gallop, rub.  Precordium quiet with normal PMI.     Lungs: Bibasilar crackles L>R. No ronchi or wheezing.  No accessory muscle use, respirations unlabored.   Abdomen: soft, non-tender, bowel sounds present, no hepatomegaly  Extremities:  2+ pitting edema.  No cyanosis.  Extremities warm to touch.  Neurological: Alert and oriented x 3.  Normal speech and affect, no gross motor deficits  Skin:  No ecchymoses, rashes, or clubbing.       Labs:  CBC RESULTS:  Lab Results   Component Value Date    WBC 5.8 07/05/2019    RBC 3.95 07/05/2019    HGB 12.3 07/05/2019    HCT 39.0 07/05/2019    MCV 99 07/05/2019    MCH 31.1 07/05/2019    MCHC 31.5 07/05/2019    RDW 14.5 07/05/2019     07/05/2019       CMP RESULTS:  Lab Results   Component Value Date     09/20/2019    POTASSIUM 3.7 09/20/2019    CHLORIDE 103 09/20/2019    CO2 32 09/20/2019    ANIONGAP 5 09/20/2019     (H) 09/20/2019    BUN 41 (H) 09/20/2019    CR 1.48 (H) 09/20/2019     GFRESTIMATED 31 (L) 09/20/2019    GFRESTBLACK 36 (L) 09/20/2019    FATOUMATA 8.7 09/20/2019    BILITOTAL 0.4 06/20/2019    ALBUMIN 3.6 06/20/2019    ALKPHOS 67 06/20/2019    ALT 43 06/20/2019    AST 44 06/20/2019        INR RESULTS:  Lab Results   Component Value Date    INR 1.12 02/11/2019       No components found for: CK  Lab Results   Component Value Date    MAG 2.1 02/12/2019     Lab Results   Component Value Date    NTBNP 3,154 (H) 10/29/2018       Most recent echocardiogram 7/5/19:  Interpretation Summary  Left and right ventricular function are normal.The EF is 55-60%.  S/P MitraClip. The clip appears well-seated with capture of both mitral  leaflets. There is mild residual MR. The mean mitral inflow gradient is 8 mmHg  at 70 bpm.  Mild pulmonary hypertension. Pulmonary artery systolic pressure is 48 mmHg (45  mmHg+RA pressure).  Moderate tricuspid insufficiency is present.     Compared to prior TTE dated 3/13/19: Mitral inflow gradient has increased  slightly. There has otherwise been no significant change.    Assessment and Plan:  In summary, this is a very pleasant 88 year old with HFpEF who appears hypervolemic due to dietary indiscretion.  I have increased her furosemide to 40 mg in the morning and 20 mg in the evening and started potassium 40 mEq daily.      She will repeat a BMP in 1 week and we have scheduled a tentative CORE visit as well if needed to reassess her volume status. If her symptoms are not improved on the Furosemide 40/20, I instructed her to increase her furosemide to 40 mg twice daily.    1. HFpEF:   Stage C NYHA CLASS IIIb:  Symptoms with minimal activity, recent dyspnea at rest  BP: Controlled at 114/73.     Fluid status:  Hypervolemic  Aldosterone antagonist: contraindicated due to renal dysfunction  Ischemia evaluation: Negative stress test in 2014  ACEi/ARB -  N/a HFpEF  BB - Coreg 3.125 mg twice daily  SCD prophylaxis - N/A, EF is normal   NSAID use: Contraindicated, reviewed with  patient   Sleep Apnea Evaluation: Refused sleep study evaluation  Remote Monitoring:  None    2.  Atrial Fibrillation:  Anticoagulation: Apixaban 2.5 mg twice daily    Rate control:  Coreg 3.125 mg twice daily.  ChadsVasc Score: 4  HR 67     3.  Tachy-Jadiel syndrome s/p PPM:  Followed by EP.  Device checks per protocol.     4.  CKD Stage IV:  Creatinine  1.48  today. Baseline per record review is 1.4.         5. Mitral regurgitation:  S/p Mitraclip.  Followed by Dr. Loza.  Follow up with him per previous recommendations.      6.  Follow-up: CORE  in one week if symptoms don't improve. Device in May 2020.  Dr. Loza this fall.      Karla MOELLER, CNP  [facility]

## 2019-09-23 NOTE — PATIENT INSTRUCTIONS
Take your medicines every day, as directed    Changes made today:  o Increase lasix to 40 mg in the am and 20 mg in the afternoon  o Start potassium 20 meq daily.    o    Monitor Your Weight and Symptoms    Contact us if you:      Gain 2 pounds in one day or 5 pounds in one week    Feel more short of breath    Notice more leg swelling    Feel lightheadeded   Change your lifestyle    Limit Salt or Sodium:    2000 mg  Limit Fluids:    2000 mL or approximately 64 ounces  Eat a Heart Healthy Diet    Low in saturated fats  Stay Active:    Aim to move at least 150 minutes every  week         To Contact us    During Business Hours:  [phone], option # 1 (University)  Then option # 4 (medical questions)     After hours, weekends or holidays:   [phone], Option #4  Ask to speak to the On-Call Cardiologist. Inform them you are a CORE/heart failure patient at the [facility].     Use [employer] allows you to communicate directly with your heart team through secure messaging.    [employer] can be accessed any time on your phone, computer, or tablet.    If you need assistance, we'd be happy to help!         Keep your Heart Appointments:    1.  CORE in one week with labs

## 2019-09-23 NOTE — TELEPHONE ENCOUNTER
Faxed refill request from: [facility]  Medication request: Trimethoprim 100mg tablets  Sig: Take 0.5 tablets by mouth daily   Last filled: 5/23/2019  Last Qty: 45  Pt's last office visit: 9/19/2019  Next scheduled office visit: none    Rx pended and routed to RNCC for review and approval if appropriate.

## 2019-09-23 NOTE — TELEPHONE ENCOUNTER
Chart reviewed and medication not on refill protocol. Will forward refill request to Dr. Martins to review and sign if appropriate.    Sena Chapman RN, BSN

## 2019-09-25 ENCOUNTER — ANCILLARY PROCEDURE (OUTPATIENT)
Dept: CARDIOLOGY | Facility: CLINIC | Age: 84
DRG: 204 | End: 2019-09-25
Attending: EMERGENCY MEDICINE
Payer: MEDICARE

## 2019-09-25 ENCOUNTER — HOSPITAL ENCOUNTER (INPATIENT)
Facility: CLINIC | Age: 84
LOS: 6 days | Discharge: HOME HEALTH | DRG: 204 | End: 2019-10-01
Attending: EMERGENCY MEDICINE | Admitting: INTERNAL MEDICINE
Payer: MEDICARE

## 2019-09-25 ENCOUNTER — TELEPHONE (OUTPATIENT)
Dept: CARDIOLOGY | Facility: CLINIC | Age: 84
End: 2019-09-25

## 2019-09-25 ENCOUNTER — APPOINTMENT (OUTPATIENT)
Dept: GENERAL RADIOLOGY | Facility: CLINIC | Age: 84
DRG: 204 | End: 2019-09-25
Attending: EMERGENCY MEDICINE
Payer: MEDICARE

## 2019-09-25 DIAGNOSIS — M75.41 IMPINGEMENT SYNDROME OF BOTH SHOULDERS: ICD-10-CM

## 2019-09-25 DIAGNOSIS — R06.00 DYSPNEA: ICD-10-CM

## 2019-09-25 DIAGNOSIS — J84.112 UIP (USUAL INTERSTITIAL PNEUMONITIS) (H): ICD-10-CM

## 2019-09-25 DIAGNOSIS — N39.0 ENTEROCOCCUS UTI: ICD-10-CM

## 2019-09-25 DIAGNOSIS — I49.5 SICK SINUS SYNDROME (H): ICD-10-CM

## 2019-09-25 DIAGNOSIS — Z51.5 ENCOUNTER FOR PALLIATIVE CARE: ICD-10-CM

## 2019-09-25 DIAGNOSIS — R15.9 FECAL INCONTINENCE: ICD-10-CM

## 2019-09-25 DIAGNOSIS — I50.30 (HFPEF) HEART FAILURE WITH PRESERVED EJECTION FRACTION (H): ICD-10-CM

## 2019-09-25 DIAGNOSIS — I10 HYPERTENSION GOAL BP (BLOOD PRESSURE) < 150/90: ICD-10-CM

## 2019-09-25 DIAGNOSIS — N28.9 RENAL INSUFFICIENCY: ICD-10-CM

## 2019-09-25 DIAGNOSIS — M05.79 RHEUMATOID ARTHRITIS INVOLVING MULTIPLE SITES WITH POSITIVE RHEUMATOID FACTOR (H): ICD-10-CM

## 2019-09-25 DIAGNOSIS — I34.0 MITRAL REGURGITATION: ICD-10-CM

## 2019-09-25 DIAGNOSIS — N95.2 ATROPHIC VAGINITIS: ICD-10-CM

## 2019-09-25 DIAGNOSIS — Z98.890 S/P LUMBAR LAMINECTOMY: ICD-10-CM

## 2019-09-25 DIAGNOSIS — I50.30 CONGESTIVE HEART FAILURE WITH PRESERVED LV FUNCTION, NYHA CLASS 3 (H): ICD-10-CM

## 2019-09-25 DIAGNOSIS — R06.02 SOB (SHORTNESS OF BREATH): ICD-10-CM

## 2019-09-25 DIAGNOSIS — J84.9 ILD (INTERSTITIAL LUNG DISEASE) (H): ICD-10-CM

## 2019-09-25 DIAGNOSIS — I50.813 ACUTE ON CHRONIC RIGHT-SIDED CONGESTIVE HEART FAILURE (H): ICD-10-CM

## 2019-09-25 DIAGNOSIS — I48.91 ATRIAL FIBRILLATION, UNSPECIFIED TYPE (H): ICD-10-CM

## 2019-09-25 DIAGNOSIS — E03.8 OTHER SPECIFIED HYPOTHYROIDISM: ICD-10-CM

## 2019-09-25 DIAGNOSIS — J30.89 SEASONAL ALLERGIC RHINITIS DUE TO OTHER ALLERGIC TRIGGER: ICD-10-CM

## 2019-09-25 DIAGNOSIS — I48.19 PERSISTENT ATRIAL FIBRILLATION (H): ICD-10-CM

## 2019-09-25 DIAGNOSIS — R06.09 DOE (DYSPNEA ON EXERTION): Primary | ICD-10-CM

## 2019-09-25 DIAGNOSIS — I34.0 MITRAL VALVE INSUFFICIENCY, UNSPECIFIED ETIOLOGY: ICD-10-CM

## 2019-09-25 DIAGNOSIS — M51.369 DDD (DEGENERATIVE DISC DISEASE), LUMBAR: ICD-10-CM

## 2019-09-25 DIAGNOSIS — M75.42 IMPINGEMENT SYNDROME OF BOTH SHOULDERS: ICD-10-CM

## 2019-09-25 DIAGNOSIS — Z76.89 HEALTH CARE HOME: ICD-10-CM

## 2019-09-25 DIAGNOSIS — I50.33 ACUTE ON CHRONIC DIASTOLIC CONGESTIVE HEART FAILURE (H): ICD-10-CM

## 2019-09-25 DIAGNOSIS — D64.9 MILD ANEMIA: ICD-10-CM

## 2019-09-25 DIAGNOSIS — Z95.0 CARDIAC PACEMAKER IN SITU: ICD-10-CM

## 2019-09-25 DIAGNOSIS — K58.9 IRRITABLE BOWEL SYNDROME: ICD-10-CM

## 2019-09-25 DIAGNOSIS — M79.89 SWELLING OF RIGHT LOWER EXTREMITY: ICD-10-CM

## 2019-09-25 DIAGNOSIS — R06.09 DYSPNEA ON EXERTION: ICD-10-CM

## 2019-09-25 DIAGNOSIS — I50.9 CHF EXACERBATION (H): ICD-10-CM

## 2019-09-25 DIAGNOSIS — I50.30 HEART FAILURE WITH PRESERVED EJECTION FRACTION, NYHA CLASS I (H): ICD-10-CM

## 2019-09-25 DIAGNOSIS — Z98.890 S/P MITRAL VALVE CLIP IMPLANTATION: ICD-10-CM

## 2019-09-25 DIAGNOSIS — Z79.899 HIGH RISK MEDICATIONS (NOT ANTICOAGULANTS) LONG-TERM USE: ICD-10-CM

## 2019-09-25 DIAGNOSIS — M81.0 AGE-RELATED OSTEOPOROSIS WITHOUT CURRENT PATHOLOGICAL FRACTURE: ICD-10-CM

## 2019-09-25 DIAGNOSIS — H35.30 MACULAR DEGENERATION, LEFT EYE: Chronic | ICD-10-CM

## 2019-09-25 DIAGNOSIS — Z95.818 S/P MITRAL VALVE CLIP IMPLANTATION: ICD-10-CM

## 2019-09-25 DIAGNOSIS — I50.30 HEART FAILURE WITH PRESERVED EJECTION FRACTION (H): ICD-10-CM

## 2019-09-25 DIAGNOSIS — R55 PRE-SYNCOPE: ICD-10-CM

## 2019-09-25 DIAGNOSIS — R33.9 URINARY RETENTION: ICD-10-CM

## 2019-09-25 DIAGNOSIS — R93.89 ABNORMAL FINDINGS ON DIAGNOSTIC IMAGING OF CARDIOVASCULAR SYSTEM: ICD-10-CM

## 2019-09-25 DIAGNOSIS — Z79.01 LONG TERM (CURRENT) USE OF ANTICOAGULANTS: ICD-10-CM

## 2019-09-25 DIAGNOSIS — N39.3 FEMALE STRESS INCONTINENCE: ICD-10-CM

## 2019-09-25 DIAGNOSIS — Z48.89 AFTERCARE FOLLOWING SURGERY: ICD-10-CM

## 2019-09-25 DIAGNOSIS — M05.9 SEROPOSITIVE RHEUMATOID ARTHRITIS (H): ICD-10-CM

## 2019-09-25 DIAGNOSIS — B95.2 ENTEROCOCCUS UTI: ICD-10-CM

## 2019-09-25 DIAGNOSIS — Z92.89 HISTORY OF BLOOD TRANSFUSION: ICD-10-CM

## 2019-09-25 DIAGNOSIS — Z79.899 HIGH RISK MEDICATION USE: ICD-10-CM

## 2019-09-25 DIAGNOSIS — D84.9 IMMUNOSUPPRESSION (H): ICD-10-CM

## 2019-09-25 DIAGNOSIS — F43.22 ADJUSTMENT DISORDER WITH ANXIOUS MOOD: ICD-10-CM

## 2019-09-25 DIAGNOSIS — N18.30 CKD (CHRONIC KIDNEY DISEASE) STAGE 3, GFR 30-59 ML/MIN (H): ICD-10-CM

## 2019-09-25 DIAGNOSIS — Z98.890 STATUS POST CORONARY ANGIOGRAM: ICD-10-CM

## 2019-09-25 DIAGNOSIS — E03.9 HYPOTHYROIDISM: ICD-10-CM

## 2019-09-25 DIAGNOSIS — I48.92 ATRIAL FLUTTER (H): ICD-10-CM

## 2019-09-25 LAB
ALBUMIN SERPL-MCNC: 3.5 G/DL (ref 3.4–5)
ALBUMIN UR-MCNC: NEGATIVE MG/DL
ALBUMIN UR-MCNC: NEGATIVE MG/DL
ALP SERPL-CCNC: 64 U/L (ref 40–150)
ALT SERPL W P-5'-P-CCNC: 29 U/L (ref 0–50)
ANION GAP SERPL CALCULATED.3IONS-SCNC: 4 MMOL/L (ref 3–14)
APPEARANCE UR: ABNORMAL
APPEARANCE UR: CLEAR
AST SERPL W P-5'-P-CCNC: 26 U/L (ref 0–45)
BASOPHILS # BLD AUTO: 0 10E9/L (ref 0–0.2)
BASOPHILS NFR BLD AUTO: 0.6 %
BILIRUB SERPL-MCNC: 0.5 MG/DL (ref 0.2–1.3)
BILIRUB UR QL STRIP: NEGATIVE
BILIRUB UR QL STRIP: NEGATIVE
BUN SERPL-MCNC: 36 MG/DL (ref 7–30)
CALCIUM SERPL-MCNC: 8.9 MG/DL (ref 8.5–10.1)
CHLORIDE SERPL-SCNC: 106 MMOL/L (ref 94–109)
CO2 SERPL-SCNC: 30 MMOL/L (ref 20–32)
COLOR UR AUTO: ABNORMAL
COLOR UR AUTO: ABNORMAL
CREAT SERPL-MCNC: 1.48 MG/DL (ref 0.52–1.04)
CRP SERPL-MCNC: <2.9 MG/L (ref 0–8)
DIFFERENTIAL METHOD BLD: ABNORMAL
EOSINOPHIL # BLD AUTO: 0.2 10E9/L (ref 0–0.7)
EOSINOPHIL NFR BLD AUTO: 3.3 %
ERYTHROCYTE [DISTWIDTH] IN BLOOD BY AUTOMATED COUNT: 14.6 % (ref 10–15)
GFR SERPL CREATININE-BSD FRML MDRD: 31 ML/MIN/{1.73_M2}
GLUCOSE BLDC GLUCOMTR-MCNC: 79 MG/DL (ref 70–99)
GLUCOSE SERPL-MCNC: 65 MG/DL (ref 70–99)
GLUCOSE UR STRIP-MCNC: NEGATIVE MG/DL
GLUCOSE UR STRIP-MCNC: NEGATIVE MG/DL
HCT VFR BLD AUTO: 38.9 % (ref 35–47)
HGB BLD-MCNC: 12.5 G/DL (ref 11.7–15.7)
HGB UR QL STRIP: NEGATIVE
HGB UR QL STRIP: NEGATIVE
HYALINE CASTS #/AREA URNS LPF: 1 /LPF (ref 0–2)
HYALINE CASTS #/AREA URNS LPF: 2 /LPF (ref 0–2)
IMM GRANULOCYTES # BLD: 0 10E9/L (ref 0–0.4)
IMM GRANULOCYTES NFR BLD: 0.2 %
INR PPP: 1.26 (ref 0.86–1.14)
INTERPRETATION ECG - MUSE: NORMAL
KETONES UR STRIP-MCNC: NEGATIVE MG/DL
KETONES UR STRIP-MCNC: NEGATIVE MG/DL
LEUKOCYTE ESTERASE UR QL STRIP: ABNORMAL
LEUKOCYTE ESTERASE UR QL STRIP: NEGATIVE
LYMPHOCYTES # BLD AUTO: 0.9 10E9/L (ref 0.8–5.3)
LYMPHOCYTES NFR BLD AUTO: 17.6 %
MCH RBC QN AUTO: 33 PG (ref 26.5–33)
MCHC RBC AUTO-ENTMCNC: 32.1 G/DL (ref 31.5–36.5)
MCV RBC AUTO: 103 FL (ref 78–100)
MONOCYTES # BLD AUTO: 0.7 10E9/L (ref 0–1.3)
MONOCYTES NFR BLD AUTO: 12.7 %
MUCOUS THREADS #/AREA URNS LPF: PRESENT /LPF
MUCOUS THREADS #/AREA URNS LPF: PRESENT /LPF
NEUTROPHILS # BLD AUTO: 3.4 10E9/L (ref 1.6–8.3)
NEUTROPHILS NFR BLD AUTO: 65.6 %
NITRATE UR QL: NEGATIVE
NITRATE UR QL: NEGATIVE
NRBC # BLD AUTO: 0 10*3/UL
NRBC BLD AUTO-RTO: 0 /100
NT-PROBNP SERPL-MCNC: 2884 PG/ML (ref 0–1800)
PH UR STRIP: 5.5 PH (ref 5–7)
PH UR STRIP: 6 PH (ref 5–7)
PLATELET # BLD AUTO: 235 10E9/L (ref 150–450)
POTASSIUM SERPL-SCNC: 4 MMOL/L (ref 3.4–5.3)
PROT SERPL-MCNC: 7.2 G/DL (ref 6.8–8.8)
RBC # BLD AUTO: 3.79 10E12/L (ref 3.8–5.2)
RBC #/AREA URNS AUTO: 0 /HPF (ref 0–2)
RBC #/AREA URNS AUTO: <1 /HPF (ref 0–2)
SODIUM SERPL-SCNC: 140 MMOL/L (ref 133–144)
SOURCE: ABNORMAL
SOURCE: ABNORMAL
SP GR UR STRIP: 1 (ref 1–1.03)
SP GR UR STRIP: 1.01 (ref 1–1.03)
SQUAMOUS #/AREA URNS AUTO: 18 /HPF (ref 0–1)
SQUAMOUS #/AREA URNS AUTO: <1 /HPF (ref 0–1)
TRANS CELLS #/AREA URNS HPF: 2 /HPF (ref 0–1)
TROPONIN I SERPL-MCNC: <0.015 UG/L (ref 0–0.04)
UROBILINOGEN UR STRIP-MCNC: NORMAL MG/DL (ref 0–2)
UROBILINOGEN UR STRIP-MCNC: NORMAL MG/DL (ref 0–2)
WBC # BLD AUTO: 5.2 10E9/L (ref 4–11)
WBC #/AREA URNS AUTO: 1 /HPF (ref 0–5)
WBC #/AREA URNS AUTO: 46 /HPF (ref 0–5)

## 2019-09-25 PROCEDURE — 00000146 ZZHCL STATISTIC GLUCOSE BY METER IP

## 2019-09-25 PROCEDURE — 85610 PROTHROMBIN TIME: CPT | Performed by: EMERGENCY MEDICINE

## 2019-09-25 PROCEDURE — 86140 C-REACTIVE PROTEIN: CPT | Performed by: EMERGENCY MEDICINE

## 2019-09-25 PROCEDURE — 85025 COMPLETE CBC W/AUTO DIFF WBC: CPT | Performed by: EMERGENCY MEDICINE

## 2019-09-25 PROCEDURE — 99285 EMERGENCY DEPT VISIT HI MDM: CPT | Mod: 25 | Performed by: EMERGENCY MEDICINE

## 2019-09-25 PROCEDURE — 12000001 ZZH R&B MED SURG/OB UMMC

## 2019-09-25 PROCEDURE — 87088 URINE BACTERIA CULTURE: CPT | Performed by: EMERGENCY MEDICINE

## 2019-09-25 PROCEDURE — 71046 X-RAY EXAM CHEST 2 VIEWS: CPT

## 2019-09-25 PROCEDURE — 81001 URINALYSIS AUTO W/SCOPE: CPT | Performed by: EMERGENCY MEDICINE

## 2019-09-25 PROCEDURE — 87086 URINE CULTURE/COLONY COUNT: CPT | Performed by: EMERGENCY MEDICINE

## 2019-09-25 PROCEDURE — 25000132 ZZH RX MED GY IP 250 OP 250 PS 637: Mod: GY | Performed by: STUDENT IN AN ORGANIZED HEALTH CARE EDUCATION/TRAINING PROGRAM

## 2019-09-25 PROCEDURE — 83880 ASSAY OF NATRIURETIC PEPTIDE: CPT | Performed by: EMERGENCY MEDICINE

## 2019-09-25 PROCEDURE — 80053 COMPREHEN METABOLIC PANEL: CPT | Performed by: EMERGENCY MEDICINE

## 2019-09-25 PROCEDURE — 84484 ASSAY OF TROPONIN QUANT: CPT | Performed by: EMERGENCY MEDICINE

## 2019-09-25 PROCEDURE — 87186 SC STD MICRODIL/AGAR DIL: CPT | Performed by: EMERGENCY MEDICINE

## 2019-09-25 PROCEDURE — 99207 CARDIAC DEVICE CHECK - REMOTE: CPT | Mod: ZP | Performed by: INTERNAL MEDICINE

## 2019-09-25 PROCEDURE — 99222 1ST HOSP IP/OBS MODERATE 55: CPT | Mod: AI | Performed by: INTERNAL MEDICINE

## 2019-09-25 PROCEDURE — 93005 ELECTROCARDIOGRAM TRACING: CPT | Performed by: EMERGENCY MEDICINE

## 2019-09-25 PROCEDURE — 93010 ELECTROCARDIOGRAM REPORT: CPT | Mod: Z6 | Performed by: EMERGENCY MEDICINE

## 2019-09-25 PROCEDURE — 93296 REM INTERROG EVL PM/IDS: CPT | Mod: ZF

## 2019-09-25 RX ORDER — AZATHIOPRINE 50 MG/1
50 TABLET ORAL DAILY
Status: DISCONTINUED | OUTPATIENT
Start: 2019-09-26 | End: 2019-10-01 | Stop reason: HOSPADM

## 2019-09-25 RX ORDER — PROCHLORPERAZINE 25 MG
12.5 SUPPOSITORY, RECTAL RECTAL EVERY 12 HOURS PRN
Status: DISCONTINUED | OUTPATIENT
Start: 2019-09-25 | End: 2019-10-01 | Stop reason: HOSPADM

## 2019-09-25 RX ORDER — ASPIRIN 81 MG/1
81 TABLET ORAL DAILY
Status: DISCONTINUED | OUTPATIENT
Start: 2019-09-26 | End: 2019-10-01 | Stop reason: HOSPADM

## 2019-09-25 RX ORDER — PROCHLORPERAZINE MALEATE 5 MG
5 TABLET ORAL EVERY 6 HOURS PRN
Status: DISCONTINUED | OUTPATIENT
Start: 2019-09-25 | End: 2019-10-01 | Stop reason: HOSPADM

## 2019-09-25 RX ORDER — ONDANSETRON 2 MG/ML
4 INJECTION INTRAMUSCULAR; INTRAVENOUS EVERY 6 HOURS PRN
Status: DISCONTINUED | OUTPATIENT
Start: 2019-09-25 | End: 2019-10-01 | Stop reason: HOSPADM

## 2019-09-25 RX ORDER — CARBOXYMETHYLCELLULOSE SODIUM 10 MG/ML
1 GEL OPHTHALMIC DAILY PRN
Status: DISCONTINUED | OUTPATIENT
Start: 2019-09-25 | End: 2019-09-28

## 2019-09-25 RX ORDER — HYDRALAZINE HYDROCHLORIDE 10 MG/1
10 TABLET, FILM COATED ORAL 3 TIMES DAILY
Status: DISCONTINUED | OUTPATIENT
Start: 2019-09-25 | End: 2019-10-01 | Stop reason: HOSPADM

## 2019-09-25 RX ORDER — LEVOTHYROXINE SODIUM 75 UG/1
75 TABLET ORAL DAILY
Status: DISCONTINUED | OUTPATIENT
Start: 2019-09-26 | End: 2019-10-01 | Stop reason: HOSPADM

## 2019-09-25 RX ORDER — FUROSEMIDE 20 MG
40 TABLET ORAL EVERY MORNING
Status: DISCONTINUED | OUTPATIENT
Start: 2019-09-26 | End: 2019-09-27

## 2019-09-25 RX ORDER — ISOSORBIDE MONONITRATE 30 MG/1
60 TABLET, EXTENDED RELEASE ORAL DAILY
Status: DISCONTINUED | OUTPATIENT
Start: 2019-09-26 | End: 2019-10-01 | Stop reason: HOSPADM

## 2019-09-25 RX ORDER — CETIRIZINE HYDROCHLORIDE 10 MG/1
10 TABLET ORAL AT BEDTIME
Status: DISCONTINUED | OUTPATIENT
Start: 2019-09-25 | End: 2019-10-01 | Stop reason: HOSPADM

## 2019-09-25 RX ORDER — TRIMETHOPRIM 100 MG/1
50 TABLET ORAL DAILY
Status: DISCONTINUED | OUTPATIENT
Start: 2019-09-26 | End: 2019-10-01 | Stop reason: HOSPADM

## 2019-09-25 RX ORDER — VALACYCLOVIR HYDROCHLORIDE 1 G/1
1000 TABLET, FILM COATED ORAL 2 TIMES DAILY
Status: DISCONTINUED | OUTPATIENT
Start: 2019-09-25 | End: 2019-09-26

## 2019-09-25 RX ORDER — FUROSEMIDE 20 MG
20 TABLET ORAL
Status: DISCONTINUED | OUTPATIENT
Start: 2019-09-25 | End: 2019-09-27

## 2019-09-25 RX ORDER — ONDANSETRON 4 MG/1
4 TABLET, ORALLY DISINTEGRATING ORAL EVERY 6 HOURS PRN
Status: DISCONTINUED | OUTPATIENT
Start: 2019-09-25 | End: 2019-10-01 | Stop reason: HOSPADM

## 2019-09-25 RX ORDER — VIT C/E/ZN/COPPR/LUTEIN/ZEAXAN 60 MG-6 MG
1 CAPSULE ORAL 2 TIMES DAILY
Status: DISCONTINUED | OUTPATIENT
Start: 2019-09-25 | End: 2019-10-01 | Stop reason: HOSPADM

## 2019-09-25 RX ORDER — LIDOCAINE 40 MG/G
CREAM TOPICAL
Status: DISCONTINUED | OUTPATIENT
Start: 2019-09-25 | End: 2019-10-01 | Stop reason: HOSPADM

## 2019-09-25 RX ORDER — NALOXONE HYDROCHLORIDE 0.4 MG/ML
.1-.4 INJECTION, SOLUTION INTRAMUSCULAR; INTRAVENOUS; SUBCUTANEOUS
Status: DISCONTINUED | OUTPATIENT
Start: 2019-09-25 | End: 2019-10-01 | Stop reason: HOSPADM

## 2019-09-25 RX ORDER — AMOXICILLIN 250 MG
1 CAPSULE ORAL DAILY PRN
Status: DISCONTINUED | OUTPATIENT
Start: 2019-09-25 | End: 2019-10-01 | Stop reason: HOSPADM

## 2019-09-25 RX ORDER — CARVEDILOL 3.12 MG/1
3.12 TABLET ORAL 2 TIMES DAILY WITH MEALS
Status: DISCONTINUED | OUTPATIENT
Start: 2019-09-25 | End: 2019-10-01 | Stop reason: HOSPADM

## 2019-09-25 RX ADMIN — FUROSEMIDE 20 MG: 20 TABLET ORAL at 22:12

## 2019-09-25 RX ADMIN — APIXABAN 2.5 MG: 2.5 TABLET, FILM COATED ORAL at 22:12

## 2019-09-25 RX ADMIN — Medication 1 CAPSULE: at 22:12

## 2019-09-25 RX ADMIN — CARVEDILOL 3.12 MG: 3.12 TABLET, FILM COATED ORAL at 22:12

## 2019-09-25 RX ADMIN — CETIRIZINE HYDROCHLORIDE 10 MG: 10 TABLET, FILM COATED ORAL at 22:12

## 2019-09-25 ASSESSMENT — ACTIVITIES OF DAILY LIVING (ADL)
BATHING: 1-->ASSISTIVE EQUIPMENT
RETIRED_EATING: 0-->INDEPENDENT
ADLS_ACUITY_SCORE: 17
DRESS: 0-->INDEPENDENT
RETIRED_COMMUNICATION: 0-->UNDERSTANDS/COMMUNICATES WITHOUT DIFFICULTY
COGNITION: 0 - NO COGNITION ISSUES REPORTED
SWALLOWING: 0-->SWALLOWS FOODS/LIQUIDS WITHOUT DIFFICULTY
FALL_HISTORY_WITHIN_LAST_SIX_MONTHS: NO
TRANSFERRING: 0-->INDEPENDENT
TOILETING: 1-->ASSISTIVE EQUIPMENT
AMBULATION: 1-->ASSISTIVE EQUIPMENT

## 2019-09-25 ASSESSMENT — ENCOUNTER SYMPTOMS
DYSURIA: 0
WEAKNESS: 1
COUGH: 0
CHEST TIGHTNESS: 0
DIZZINESS: 1
HEMATURIA: 0
BLOOD IN STOOL: 0
FATIGUE: 1
PALPITATIONS: 0
FEVER: 0
SHORTNESS OF BREATH: 1
FREQUENCY: 0
DIFFICULTY URINATING: 0
ABDOMINAL PAIN: 0

## 2019-09-25 ASSESSMENT — MIFFLIN-ST. JEOR: SCORE: 1169.79

## 2019-09-25 NOTE — ED NOTES
[facility]   ED Nurse to Floor Handoff     Linda Spicer is a 88 year old female who speaks English and lives alone,  in an assisted living  They arrived in the ED by ambulance from home    ED Chief Complaint: Shortness of Breath    ED Dx;   Final diagnoses:   Dyspnea on exertion   Acute on chronic right-sided congestive heart failure (H)         Needed?: No    Allergies:   Allergies   Allergen Reactions     Cephalexin Diarrhea and GI Disturbance     Other reaction(s): GI Upset       Cephalexin Hcl Diarrhea     Gabapentin Other (See Comments)     Dizzsiness  Shaky, dizzy, dry mouth  Dizzsiness  Shaky,dry mouth       Methotrexate Other (See Comments)     Depleted Folic Acid  And caused severe leg cramps  Severe leg cramps     Naproxen GI Disturbance, Other (See Comments) and Nausea     Constipation. Tolerates ibuprofen.       Perfume      Sulfa Drugs Shortness Of Breath     Throat swelling     Alprazolam Other (See Comments)     Dizziness      Lactase Other (See Comments) and Unknown     Per pt - pt has lactose intolerance and cannot drink milk. Pt can tolerate other dairy products.      Levofloxacin GI Disturbance     Macrobid [Nitrofurantoin Anhydrous]      Possibly related to lung disease      Nitrofurantoin      Possibly related to lung disease      Seasonal Allergies      Ciprofloxacin Itching and Rash   .  Past Medical Hx:   Past Medical History:   Diagnosis Date     Adjustment disorder with anxious mood 5/18/2015     Advanced directives, counseling/discussion 8/30/2012    Patient states has Advance Directive and will bring in a copy to clinic. 8/30/2012   Tevin May  [facility] Medical Assistant \       Anemia 9/25/2015     Basal cell cancer      CHF (congestive heart failure) (H) 9/18/2014     CKD (chronic kidney disease) stage 3, GFR 30-59 ml/min (H) 9/29/2015     DDD (degenerative disc disease), lumbar 9/25/2015     Diffuse idiopathic pulmonary fibrosis (H)  5/6/2013     Encounter for palliative care 5/18/2015     History of blood transfusion 9/29/2015     Hypertension goal BP (blood pressure) < 140/90 9/7/2012     Hypothyroid 9/7/2012     Irritable bowel syndrome 10/29/2013     Macular degeneration      Macular degeneration, 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     Nondisplaced spiral fracture of shaft of humerus      Osteoporosis 8/13/2013     Imo Update utility     RA (rheumatoid arthritis) (H) 5/7/2013     Rheumatic fever      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 2/20/2019     Shingles      Spinal stenosis of lumbar region with neurogenic claudication 9/14/2015      Baseline Mental status: WDL  Current Mental Status changes: at basesline    Infection present or suspected this encounter: no  Sepsis suspected: No  Isolation type: No active isolations     Activity level - Baseline/Home:  Independent  Activity Level - Current:   Stand with Assist of walker    Bariatric equipment needed?: No    In the ED these meds were given: Medications - No data to display    Drips running?  No    Home pump  No    Current LDAs  Peripheral IV 09/25/19 Right (Active)   Site Assessment WDL 9/25/2019 10:23 AM   Line Status Saline locked 9/25/2019 10:23 AM   Phlebitis Scale 0-->no symptoms 9/25/2019 10:23 AM   Infiltration Scale 0 9/25/2019 10:23 AM   Extravasation? No 9/25/2019 10:23 AM   Number of days: 0       Right Groin Interventional Procedure Access (Active)   Number of days: 226       Wound 02/11/19 Left;Upper Lip Laceration (Active)   Number of days: 226       Incision/Surgical Site 10/13/15 Midline;Posterior Back (Active)   Number of days: 1443       Labs results:   Labs Ordered and Resulted from Time of ED Arrival Up to the Time of Departure from the ED   CBC WITH PLATELETS DIFFERENTIAL - Abnormal; Notable for the following components:       Result Value    RBC Count 3.79 (*)      (*)     All other components within normal limits   COMPREHENSIVE METABOLIC PANEL - Abnormal; Notable for  "the following components:    Glucose 65 (*)     Urea Nitrogen 36 (*)     Creatinine 1.48 (*)     GFR Estimate 31 (*)     GFR Estimate If Black 36 (*)     All other components within normal limits   NT PROBNP INPATIENT - Abnormal; Notable for the following components:    N-Terminal Pro BNP Inpatient 2,884 (*)     All other components within normal limits   ROUTINE UA WITH MICROSCOPIC - Abnormal; Notable for the following components:    Leukocyte Esterase Urine Large (*)     WBC Urine 46 (*)     Squamous Epithelial /HPF Urine 18 (*)     Transitional Epi 2 (*)     Mucous Urine Present (*)     All other components within normal limits   ROUTINE UA WITH MICROSCOPIC - Abnormal; Notable for the following components:    Mucous Urine Present (*)     All other components within normal limits   TROPONIN I   CRP INFLAMMATION   INR   URINE CULTURE AEROBIC BACTERIAL       Imaging Studies: No results found for this or any previous visit (from the past 24 hour(s)).    Recent vital signs:   /76   Pulse 71   Temp 97.7  F (36.5  C) (Oral)   Resp 18   Ht 1.626 m (5' 4\")   Wt 75.5 kg (166 lb 6.4 oz)   LMP  (LMP Unknown)   SpO2 100%   BMI 28.56 kg/m      Yohan Coma Scale Score: 15 (09/25/19 1317)       Cardiac Rhythm: Other V Paced  Pt needs tele? Yes  Skin/wound Issues: None    Code Status: DNR    Pain control: pt had none    Nausea control: pt had none    Abnormal labs/tests/findings requiring intervention: N/A    Family present during ED course? No   Family Comments/Social Situation comments: N/A    Tasks needing completion: None    Miguelangel Valenzuela RN  [phone] [facility] ED      "

## 2019-09-25 NOTE — H&P
[facility], [place]    History and Physical - [facility]/Night Float Service        Date of Admission:  9/25/2019    Assessment & Plan   Linda Spicer is a 88 year old female admitted on 9/25/2019. She has a pertinent history including RA c/b IPF, HFpEF (LVEF 55-60% on 7/5/2019), HTN, CKD (baseline Cr 1.4-1.6), hypothyroid, and is admitted for dyspnea and weakness starting yesterday.    #dyspnea  Patient presents with 2-day history of acute dyspnea to the point of limiting conversation without feeling SOB. Also reporting fatigue. She has been afebrile, and is saturating well on RA. No leukocytosis. Labs show hypercapnia. CRP negative. Pro-BNP mildly elevated. CXR appears stable from 7/5, but consistent with underlying IPF, and without obvious new opacity or effusions. Differential is broad, including: worsening of IPF vs infection vs cardiovascular cause. Vitals and labs to this point do not favor infectious cause, and patient has been on trimethoprim prophylaxis while on Imuran and with Orencia infusions. CXR and exam not convincing of fluid backing up in lungs at this time that might indicate worsening heart failure. Also had MV clip procedure in February, but clinical picture is not consistent with recurrent mitral regurgiation. Will obtain TSH to assess for metabolic demand in setting of poor gas exchange. It is possible that this represents a worsening of her underlying IPF, and she would benefit from Pulmonary evaluation for potential need to steroids, although in setting of possible infection.  --Obtain VBG, to help guide possible need for further respiratory support  --Obtain TSH, ESR  --Consulted Pulm, appreciate evaluation and recs  --Consider echo if clinically changed  --Follow urine cultures    Chronic Medical Problems  #RA -continue PTA imuran, trimethoprim  #CKD - stable, at baseline. Monitor AM labs  #hypothyroid -continue levothyroxine 75 mg daily. Checking  TSH.  #HTN -continue PTA carvedilol, lasix (40mg qAM, 20mg qPM), imdur, ASA  #Atrial fibrillation -continue PTA Eliquis 2.5 mg BID  #tachybrady syndrome s/p PPM -device interrogated without signs of malfunction  #h/o MR s/p MV clip (2/2019) -monitor clinically     Diet: 1g salt restricted diet, per patient request  Fluids: Encourage PO.  DVT Prophylaxis: On Eliquis  Gallagher Catheter: not present  Code Status: DNR    Disposition Plan   Expected discharge: 4 - 7 days, recommended to prior living arrangement once improvement in breathing symptoms and safe discharge planning.  Entered: Antonio Spears MD 09/25/2019, 5:57 PM       The patient's care was discussed with the Attending Physician, Dr. Porras.    Antonio Spears MD  [facility]/Night Service  [facility], [place]  Pager: [phone]  Please see sticky note for cross cover information  ______________________________________________________________________    Chief Complaint   SOB, fatigue    History is obtained from the patient    History of Present Illness   Linda Spicer is a 88 year old female who has a history of RA c/b IPF, HFpEF (LVEF 55-60% on 7/5/2019), HTN, CKD (baseline Cr 1.4-1.6), hypothyroid, Afib, tachybrady syndrome s/p PPM, mitral regurg s/p MV clip (2/2019). She is admitted for workup of acute SOB that started yesterday.    Patient complains of 2-day history of SOB and fatigue that started yesterday morning and has progressively gotten worse. She went to breakfast this morning with friends and was unable to complete sentences without SOB. She returned back to her apartment, and was supposedly sating in upper 90's prior to arrival of EMS. Reports subjective swelling in her legs, which has been improving over past weeks with adjustments to her lasix dosing. She subjectively endorses feeling similar degree of SOB as she did prior to MV clip in February. Denies cough, fevers, chills, nausea vomiting,  hemoptysis, dysphagia, chest pain, constipation/diarrhea, dysuria, blood in urine/stool, numbness or tingling, rashes, sores, painful or swollen joints.    On review of recent history, on 9/5 was seen for dysuria and was found to have E faecalis UTI and completed 14-day course of doxycycline 100mg. On 9/9 was seen at [facility] with 3-4 pound weight gain, and had her lasix dose adjusted to 20mg PO BID. On 9/16 she went to [place] with complaint of acute urinary retention and had mcnulty placed. UA negative. With continuing LE edema was found to have Cr increased to 1.99. On 9/20 seen again at [facility] with potassium 3.7 and improved Cr of 1.48. Lasix adjusted to 40mg qAM and 20mg qPM.        Review of Systems    The 10 point Review of Systems is negative other than noted in the HPI or here.    Past Medical History    I have reviewed this patient's medical history and updated it with pertinent information if needed.   Past Medical History:   Diagnosis Date     Adjustment disorder with anxious mood 5/18/2015     Advanced directives, counseling/discussion 8/30/2012    Patient states has Advance Directive and will bring in a copy to clinic. 8/30/2012   [facility] Medical Assistant \       Anemia 9/25/2015     Basal cell cancer      CHF (congestive heart failure) (H) 9/18/2014     CKD (chronic kidney disease) stage 3, GFR 30-59 ml/min (H) 9/29/2015     DDD (degenerative disc disease), lumbar 9/25/2015     Diffuse idiopathic pulmonary fibrosis (H) 5/6/2013     Encounter for palliative care 5/18/2015     History of blood transfusion 9/29/2015     Hypertension goal BP (blood pressure) < 140/90 9/7/2012     Hypothyroid 9/7/2012     Irritable bowel syndrome 10/29/2013     Macular degeneration      Macular degeneration, 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     Nondisplaced spiral fracture of shaft of humerus      Osteoporosis 8/13/2013     Imo Update utility     RA (rheumatoid arthritis) (H) 5/7/2013     Rheumatic fever       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 2/20/2019     Shingles      Spinal stenosis of lumbar region with neurogenic claudication 9/14/2015        Past Surgical History   I have reviewed this patient's surgical history and updated it with pertinent information if needed.  Past Surgical History:   Procedure Laterality Date     ANESTHESIA CARDIOVERSION N/A 9/14/2018    Procedure: ANESTHESIA CARDIOVERSION;;  Surgeon: GENERIC ANESTHESIA PROVIDER;  Location: [facility] OR     ANESTHESIA CARDIOVERSION N/A 10/11/2018    Procedure: ANESTHESIA CARDIOVERSION;  Cardioversion;  Surgeon: GENERIC ANESTHESIA PROVIDER;  Location: [facility] OR     ANESTHESIA CARDIOVERSION N/A 10/16/2018    Procedure: Anesthesia Cardioversion ;  Surgeon: GENERIC ANESTHESIA PROVIDER;  Location:  OR     APPENDECTOMY       BIOPSY      hemorrhoidectomy     CV HEART CATHETERIZATION WITH POSSIBLE INTERVENTION N/A 1/31/2019    Procedure: CORS,LHC,RHC-YOLANDA BEFORE CATH APPOINTMENT;  Surgeon: Avila Watson MD;  Location:  HEART CARDIAC CATH LAB     CV MITRACLIP N/A 2/11/2019    Procedure: Mitraclip Procedure;  Surgeon: Avila Watson MD;  Location:  OR     ENT SURGERY      tonsillectomy     GYN SURGERY      3 D & C's     HYSTERECTOMY, PAP NO LONGER INDICATED       LAMINECTOMY LUMBAR ONE LEVEL N/A 10/13/2015    Procedure: LAMINECTOMY LUMBAR ONE LEVEL;  Surgeon: Fransico Toussaint MD;  Location:  OR        Social History   I have reviewed this patient's social history and updated it with pertinent information if needed. Linda Spicer  reports that she has never smoked. She has never used smokeless tobacco. She reports current alcohol use. She reports that she does not use drugs.    Family History   I have reviewed this patient's family history and updated it with pertinent information if needed.   Family History   Problem Relation Age of Onset     Hypertension Mother      Psychotic Disorder Father      Diabetes Son      Diabetes Daughter      Blood  Disease Daughter        Prior to Admission Medications   Prior to Admission Medications   Prescriptions Last Dose Informant Patient Reported? Taking?   COMPOUNDED NON-CONTROLLED SUBSTANCE (CMPD RX) - PHARMACY TO MIX COMPOUNDED MEDICATION   No No   Sig: Estriol 1mg/gram. Place 1 gram vaginally daily for 2 weeks. Then vaginally twice weekly   Carboxymethylcellulose Sod PF (CELLUVISC/REFRESH LIQUIGEL) 1 % ophthalmic gel   No No   Sig: Place 1 drop into both eyes daily as needed for dry eyes   Multiple Vitamins-Minerals (PRESERVISION AREDS) CAPS   No No   Sig: Take 1 capsule by mouth 2 times daily   apixaban ANTICOAGULANT (ELIQUIS ANTICOAGULANT) 2.5 MG tablet   No No   Sig: Take 1 tablet (2.5 mg) by mouth 2 times daily   apixaban ANTICOAGULANT (ELIQUIS) 2.5 MG tablet   No No   Sig: Take 1 tablet (2.5 mg) by mouth 2 times daily   aspirin (ASA) 81 MG EC tablet   No No   Sig: Take 1 tablet (81 mg) by mouth daily   azaTHIOprine (IMURAN) 50 MG tablet   No No   Sig: Take 1 tablet (50 mg) by mouth daily   calcium carbonate-vitamin D (OS-FATOUMATA) 500-400 MG-UNIT tablet   No No   Sig: Take 1 tablet by mouth daily   carvedilol (COREG) 3.125 MG tablet   No No   Sig: Take 1 tablet (3.125 mg) by mouth 2 times daily (with meals)   cetirizine (ZYRTEC ALLERGY) 10 MG tablet   No No   Sig: Take 1 tablet (10 mg) by mouth At Bedtime   denosumab (PROLIA) 60 MG/ML SOLN injection   No No   Sig: Inject 1 mL (60 mg) Subcutaneous every 6 months   doxycycline monohydrate (MONODOX) 100 MG capsule   No No   Sig: Take 1 capsule (100 mg) by mouth 2 times daily for 14 days   furosemide (LASIX) 20 MG tablet   No No   Sig: Take 40 mg in the am and 20 mg in the afternoon.   hydrALAZINE (APRESOLINE) 10 MG tablet   No No   Sig: Take 1 tablet (10 mg) by mouth 3 times daily   isosorbide mononitrate (IMDUR) 60 MG 24 hr tablet   No No   Sig: Take 1 tablet (60 mg) by mouth daily   levothyroxine (SYNTHROID/LEVOTHROID) 75 MCG tablet   No No   Sig: TAKE 1 TABLET BY  MOUTH EVERY DAY   nitroGLYcerin (NITROSTAT) 0.4 MG sublingual tablet   No No   Sig: Place 1 tablet (0.4 mg) under the tongue every 5 minutes as needed for chest pain   order for DME   No No   Sig: Equipment being ordered: Dispense face mask.  Mrs. Spicer is immunosuppressed due to rheumatoid arthritis.   order for DME   No No   Sig: Equipment being ordered: Nebulizer. Use with Albuterol.   order for DME   No No   Sig: Equipment being ordered: Dispense baffle, for use with nebulizer.   order for DME   No No   Sig: Dispense SP Walker-small   potassium chloride ER (K-DUR/KLOR-CON M) 20 MEQ CR tablet   No No   Sig: Take 2 tablets (40 mEq) by mouth daily   ranibizumab (LUCENTIS) 0.3 MG/0.05ML SOLN   No No   Si.05 mLs (0.3 mg) by Intravitreal route See Admin Instructions Every 6 weeks   senna-docusate (SENOKOT-S/PERICOLACE) 8.6-50 MG tablet   No No   Sig: Take 1 tablet by mouth daily as needed for constipation   trimethoprim (TRIMPEX) 100 MG tablet   No No   Sig: Take 0.5 tablets (50 mg) by mouth daily   valACYclovir (VALTREX) 1000 mg tablet   No No   Sig: Take 1 tablet (1,000 mg) by mouth 2 times daily      Facility-Administered Medications: None     Allergies   Allergies   Allergen Reactions     Cephalexin Diarrhea and GI Disturbance     Other reaction(s): GI Upset       Cephalexin Hcl Diarrhea     Gabapentin Other (See Comments)     Dizzsiness  Shaky, dizzy, dry mouth  Dizzsiness  Shaky,dry mouth       Methotrexate Other (See Comments)     Depleted Folic Acid  And caused severe leg cramps  Severe leg cramps     Naproxen GI Disturbance, Other (See Comments) and Nausea     Constipation. Tolerates ibuprofen.       Perfume      Sulfa Drugs Shortness Of Breath     Throat swelling     Alprazolam Other (See Comments)     Dizziness      Lactase Other (See Comments) and Unknown     Per pt - pt has lactose intolerance and cannot drink milk. Pt can tolerate other dairy products.      Levofloxacin GI Disturbance     Macrobid  [Nitrofurantoin Anhydrous]      Possibly related to lung disease      Nitrofurantoin      Possibly related to lung disease      Seasonal Allergies      Ciprofloxacin Itching and Rash       Physical Exam   Vital Signs: Temp: 97.7  F (36.5  C) Temp src: Oral BP: 130/73 Pulse: 69 Heart Rate: 71 Resp: 23 SpO2: 98 % O2 Device: None (Room air)    Weight: 166 lbs 6.4 oz    General Appearance: alert, pleasant, NAD  Eyes: PERRL. No scleral icterus. EOM intact bilaterally.   HEENT: NC/AT. Thin hair. MMM.  Respiratory: Left basilar crackles, coarse breath sounds. Shallow breathing. On RA.  Cardiovascular: Irregularly irregular. No murmur appreciated. No rub. Pulses intact bilaterally.  GI: Soft, nontender, nondistended. Normoactive BS.  Skin: Warm and dry. No rashes or sores.   Musculoskeletal: Scant LE edema. Able to move all limbs freely.  Neurologic: AAOx3. No focal neurologic deficit. CN II-XII intact. Normal strength throughout.    Data   Data reviewed today: I reviewed all medications, new labs and imaging results over the last 24 hours. I personally reviewed the EKG tracing showing ventricular pacing and the chest x-ray image(s) showing stable L basilar appearance in setting of ILD.    Recent Labs   Lab 09/25/19  1020 09/20/19  0848   WBC 5.2  --    HGB 12.5  --    *  --      --    INR 1.26*  --     140   POTASSIUM 4.0 3.7   CHLORIDE 106 103   CO2 30 32   BUN 36* 41*   CR 1.48* 1.48*   ANIONGAP 4 5   FATOUMATA 8.9 8.7   GLC 65* 111*   ALBUMIN 3.5  --    PROTTOTAL 7.2  --    BILITOTAL 0.5  --    ALKPHOS 64  --    ALT 29  --    AST 26  --    TROPI <0.015  --

## 2019-09-25 NOTE — ED PROVIDER NOTES
[place] EMERGENCY DEPARTMENT ([facility])  9/25/19 ED 22 10:06 AM   History     Chief Complaint   Patient presents with     Shortness of Breath     The history is provided by the patient, a relative and medical records.     Linda Spicer is a 88 year old female with a past medical history significant for CKD stage 3, COPD, CHF, [employer] ventricular pacemaker (2017), atrial fibrillation, s/p mitral valve clip implantation (2/11/2019), HTN, and hypothyroidism who presents here to the Emergency Department via EMS due to shortness of breath and generalized weakness. Patient has noted that over the past few days she has felt more short of breath, generally weak and fatigued. Patient was at breakfast this morning and felt very weak and short of breath from talking.  Patient had another resident help her walk back to her apartment and then called the patients daughter. Daughter reports that the patient was sating at 97% when EMS had arrived. Patient states that talking takes up all of her oxygen. She notes that on 9/5/2019 she initially started to have shortness of breath and urinary symptoms and had been diagnosed with a UTI and was started on Doxycycline x14 days. She states that after getting off of the Doxycycline her symptoms had began. She notes that she was rechecked on 9/17/2019 and was tested negative for a UTI. Patient denies urinary symptoms currently but shortness of breath is no better. Denies syncope, chest pain, chest pressure, cough with sputum, blood in stool/black tarry stools, abdominal pain, fevers or palpitations. Denies these extreme symptoms in the past. Patient states that she lays flat while sleeping and has continued to do so. Notes leg swelling, however, this is decreased from previous. Denies previous DVT/PE. States that she is on Eliquis and ASA currently. Patient had her Lasix increased and had potassium added to her home medications on 9/23/2019 in core clinic due to signs of  volume overload at that time.    Chest X-ray 9/16/2019:  IMPRESSION:  No new or acute finding in the chest. Left basilar airspace opacities likely from scar or chronic atelectasis, less likely pneumonia.    I have reviewed the Medications, Allergies, Past Medical and Surgical History, and Social History in the [facility] system.    PAST MEDICAL HISTORY:   Past Medical History:   Diagnosis Date     Adjustment disorder with anxious mood 5/18/2015     Advanced directives, counseling/discussion 8/30/2012    Patient states has Advance Directive and will bring in a copy to clinic. 8/30/2012   [facility] Medical Assistant \       Anemia 9/25/2015     Basal cell cancer      CHF (congestive heart failure) (H) 9/18/2014     CKD (chronic kidney disease) stage 3, GFR 30-59 ml/min (H) 9/29/2015     DDD (degenerative disc disease), lumbar 9/25/2015     Diffuse idiopathic pulmonary fibrosis (H) 5/6/2013     Encounter for palliative care 5/18/2015     History of blood transfusion 9/29/2015     Hypertension goal BP (blood pressure) < 140/90 9/7/2012     Hypothyroid 9/7/2012     Irritable bowel syndrome 10/29/2013     Macular degeneration      Macular degeneration, 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     Nondisplaced spiral fracture of shaft of humerus      Osteoporosis 8/13/2013     Imo Update utility     RA (rheumatoid arthritis) (H) 5/7/2013     Rheumatic fever      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 2/20/2019     Shingles      Spinal stenosis of lumbar region with neurogenic claudication 9/14/2015       PAST SURGICAL HISTORY:   Past Surgical History:   Procedure Laterality Date     ANESTHESIA CARDIOVERSION N/A 9/14/2018    Procedure: ANESTHESIA CARDIOVERSION;;  Surgeon: GENERIC ANESTHESIA PROVIDER;  Location: [facility] OR     ANESTHESIA CARDIOVERSION N/A 10/11/2018    Procedure: ANESTHESIA CARDIOVERSION;  Cardioversion;  Surgeon: GENERIC ANESTHESIA PROVIDER;  Location: [facility] OR     ANESTHESIA CARDIOVERSION N/A 10/16/2018    Procedure:  Anesthesia Cardioversion ;  Surgeon: GENERIC ANESTHESIA PROVIDER;  Location: [facility] OR     APPENDECTOMY       BIOPSY      hemorrhoidectomy     CV HEART CATHETERIZATION WITH POSSIBLE INTERVENTION N/A 1/31/2019    Procedure: CORS,LHC,RHC-YOLANDA BEFORE CATH APPOINTMENT;  Surgeon: Avila Watson MD;  Location:  HEART CARDIAC CATH LAB     CV MITRACLIP N/A 2/11/2019    Procedure: Mitraclip Procedure;  Surgeon: Avila Watson MD;  Location:  OR     ENT SURGERY      tonsillectomy     GYN SURGERY      3 D & C's     HYSTERECTOMY, PAP NO LONGER INDICATED       LAMINECTOMY LUMBAR ONE LEVEL N/A 10/13/2015    Procedure: LAMINECTOMY LUMBAR ONE LEVEL;  Surgeon: Fransico Toussaint MD;  Location:  OR       FAMILY HISTORY:   Family History   Problem Relation Age of Onset     Hypertension Mother      Psychotic Disorder Father      Diabetes Son      Diabetes Daughter      Blood Disease Daughter        SOCIAL HISTORY:   Social History     Tobacco Use     Smoking status: Never Smoker     Smokeless tobacco: Never Used   Substance Use Topics     Alcohol use: Yes     Comment: rare wine      No current facility-administered medications for this encounter.      Current Outpatient Medications   Medication     apixaban ANTICOAGULANT (ELIQUIS ANTICOAGULANT) 2.5 MG tablet     apixaban ANTICOAGULANT (ELIQUIS) 2.5 MG tablet     azaTHIOprine (IMURAN) 50 MG tablet     carvedilol (COREG) 3.125 MG tablet     COMPOUNDED NON-CONTROLLED SUBSTANCE (CMPD RX) - PHARMACY TO MIX COMPOUNDED MEDICATION     denosumab (PROLIA) 60 MG/ML SOLN injection     furosemide (LASIX) 20 MG tablet     hydrALAZINE (APRESOLINE) 10 MG tablet     isosorbide mononitrate (IMDUR) 60 MG 24 hr tablet     levothyroxine (SYNTHROID/LEVOTHROID) 75 MCG tablet     nitroGLYcerin (NITROSTAT) 0.4 MG sublingual tablet     order for DME     order for DME     order for DME     order for DME     potassium chloride ER (K-DUR/KLOR-CON M) 20 MEQ CR tablet     senna-docusate  "(SENOKOT-S/PERICOLACE) 8.6-50 MG tablet     trimethoprim (TRIMPEX) 100 MG tablet     valACYclovir (VALTREX) 1000 mg tablet        Allergies   Allergen Reactions     Cephalexin Diarrhea and GI Disturbance     Other reaction(s): GI Upset       Cephalexin Hcl Diarrhea     Gabapentin Other (See Comments)     Dizzsiness  Shaky, dizzy, dry mouth  Dizzsiness  Shaky,dry mouth       Methotrexate Other (See Comments)     Depleted Folic Acid  And caused severe leg cramps  Severe leg cramps     Naproxen GI Disturbance, Other (See Comments) and Nausea     Constipation. Tolerates ibuprofen.       Perfume      Sulfa Drugs Shortness Of Breath     Throat swelling     Alprazolam Other (See Comments)     Dizziness      Lactase Other (See Comments) and Unknown     Per pt - pt has lactose intolerance and cannot drink milk. Pt can tolerate other dairy products.      Levofloxacin GI Disturbance     Macrobid [Nitrofurantoin Anhydrous]      Possibly related to lung disease      Nitrofurantoin      Possibly related to lung disease      Seasonal Allergies      Ciprofloxacin Itching and Rash         Review of Systems   Constitutional: Positive for fatigue. Negative for fever.   Respiratory: Positive for shortness of breath. Negative for cough and chest tightness.    Cardiovascular: Positive for leg swelling (Decreased). Negative for chest pain and palpitations.   Gastrointestinal: Negative for abdominal pain and blood in stool.   Genitourinary: Negative for decreased urine volume, difficulty urinating, dysuria, frequency, hematuria and urgency.   Neurological: Positive for dizziness and weakness (Generalized). Negative for syncope.   All other systems reviewed and are negative.      Physical Exam   BP: (!) 153/74  Heart Rate: 74  Temp: 97.7  F (36.5  C)  Resp: 18  Height: 162.6 cm (5' 4\")  Weight: 75.5 kg (166 lb 6.4 oz)  SpO2: 95 %      Physical Exam  General: patient is alert and oriented and in no acute distress   Head: atraumatic and " normocephalic   EENT: moist mucus membranes without tonsillar erythema or exudates, pupils round and reactive   Neck: supple  Cardiovascular: regular rate and rhythm, no murmur appreciated, extremities warm and well perfused, 2+ radial and PT pulses bilaterally, trace lower extremity edema, elevated JVD  Pulmonary: lungs clear to auscultation bilaterally without crackles or wheezing  Abdomen: soft, non-tender   Musculoskeletal: normal range of motion   Neurological: alert and oriented, moving all extremities symmetrically, gait normal   Skin: warm, dry     ED Course   10:20 AM  The patient was seen and examined by Amy Eldridge MD in Room ED22.        Procedures             EKG Interpretation:      Interpreted by Amy Eldridge MD  Time reviewed: 0915  Symptoms at time of EKG: dyspnea   Rhythm: paced  Rate: Normal  Axis: Left Axis Deviation  Ectopy: none  Conduction: ventricular paced  ST Segments/ T Waves: Non-specific ST-T wave changes  Q Waves: nonspecific  Comparison to prior: Unchanged    Clinical Impression: paced rhythm                Critical Care time:  none             Labs Ordered and Resulted from Time of ED Arrival Up to the Time of Departure from the ED - No data to display         Assessments & Plan (with Medical Decision Making)   Patient is a 88-year-old female with a history of atrial fibrillation, congestive heart failure with RV dysfunction status post pacemaker placement, chronic anticoagulation, chronic kidney disease, rheumatoid arthritis, interstitial lung disease on Prolia injections who presents to the Emergency Department with shortness of breath and generalized weakness.  She does appear to be quite fatigued and dyspneic on exam.  Differential diagnosis includes but is not limited to ACS, CHF exacerbation, PE, cardiac dysrhythmia, anemia, electrolyte derangement, worsening renal dysfunction, persistent UTI.  Patient's ECG shows a paced rhythm.  Labs demonstrate a normal hemoglobin at  12.5, creatinine at baseline at 1.48, no elevation in troponin and BNP at baseline at 2884.  Her UA is not suggestive of ongoing UTI.  Pacemaker was interrogated and shows underlying rhythm of atrial fibrillation without ventricular dysrhythmia.  Patient has been compliant with anticoagulation and lower likelihood for PE.  Her chest x-ray shows no acute findings including any evidence of pleural effusion, pulmonary edema, pneumothorax.  Potential for ILD exacerbation versus worsening cardiac function versus deconditioning in the setting of recent UTI.  Given patient's ongoing symptoms we will plan to admit to medicine for continued work-up and possible TCU placement for PT/OT.      This part of the medical record was transcribed by David Snowden, Medical Scribe, from a dictation done by Amy Eldridge MD.     I have reviewed the nursing notes.    I have reviewed the findings, diagnosis, plan and need for follow up with the patient.    New Prescriptions    No medications on file       Final diagnoses:   Dyspnea on exertion   Acute on chronic right-sided congestive heart failure (H)     I, David Snowden, am serving as a trained medical scribe to document services personally performed by Amy Eldridge MD, based on the provider's statements to me.   I, Amy Eldridge MD, was physically present and have reviewed and verified the accuracy of this note documented by David Snowden.    9/25/2019   [facility], [place], EMERGENCY DEPARTMENT     Amy Eldridge MD  09/25/19 1739

## 2019-09-25 NOTE — LETTER
Transition Communication Hand-off for Care Transitions to Next Level of Care Provider    Name: Linda Spicer  : 1931  MRN #: [id number]  Primary Care Provider: Tre Lockett     Primary Clinic: [facility] [address]  [facility] [facility] [address]     Reason for Hospitalization:  Dyspnea on exertion [R06.09]  Acute on chronic right-sided congestive heart failure (H) [I50.813]  Admit Date/Time: 2019  9:52 AM  Discharge Date: 10/1/2019  Payor Source: Payor: MEDICARE / Plan: MEDICARE / Product Type: Medicare /     Readmission Assessment Measure (ZAHIRA) Risk Score/category: 10/Medium    Reason for Communication Hand-off Referral: Multiple providers/specialties    Discharge Plan:  Follow up with primary care provider, Tre Lockett, within 14 days, for hospital follow- up. No follow up labs or test are needed.      Concern for non-adherence with plan of care:   No  Discharge Needs Assessment:  Needs      Most Recent Value   Equipment Currently Used at Home  raised toilet, shower chair, walker, rolling [4WW]          Follow-up specialty is recommended: No    Follow-up plan:    Future Appointments   Date Time Provider Department Center   10/4/2019  9:30 AM NURSE ONLY SURGERY [facility] [place]   10/17/2019  9:00 AM BAY 7 INFUSION [facility] [place]   2019 12:40 PM Mario Davenport MD [facility] [place]   2019 11:00 AM BAY 7 INFUSION [facility] [place]   2020 12:00 AM UC ICD REMOTE [facility]   2020  1:30 PM DEVICE CHECK RN CARD [facility]       Any outstanding tests or procedures:        Referrals     Future Labs/Procedures    Home care nursing referral     Comments:    RN skilled nursing visit. RN to assess vital signs and weight, respiratory and cardiac status, patients ability to take and record daily blood pressure, temp and weight, pain level and activity tolerance, hydration, nutrition and bowel status and home safety.  RN to teach medication management.  RN to provide post  hospital follow up visit.     HHA to assist with showering/bathing, dressing and activities of daily living.     [facility]  Phone: [phone]  Fax: [phone]    Your provider has ordered home care nursing services. If you have not been contacted within 2 days of your discharge please call the inpatient department phone number at [phone].    Home Care OT Referral for Hospital Discharge     Comments:    OT to eval and treat    Your provider has ordered home care - occupational therapy. If you have not been contacted within 2 days of your discharge please call the department phone number listed on the top of this document.    Home Care PT Referral for Hospital Discharge     Comments:    PT to eval and treat    Your provider has ordered home care - physical therapy. If you have not been contacted within 2 days of your discharge please call the department phone number listed on the top of this document.    Home Care PT Referral for Hospital Discharge     Comments:    PT to eval and treat    Your provider has ordered home care - physical therapy. If you have not been contacted within 2 days of your discharge please call the department phone number listed on the top of this document.    Medication Therapy Management Referral     Process Instructions:        This referral will be filtered to a centralized scheduling office at [facility] and the patient will receive a call to schedule an appointment at a [place] location most convenient for them.    Comments:    MTM referral reason            Patient had a hospital or ED visit in last 6 months and has more than 10   PTA or Discharge medications    Patient has 5 PTA or Discharge Medications AND one of the following   diagnoses: DM,HF,COPD,AMI DX,PULM HTN       This service is designed to help you get the most from your medications.  A specially trained pharmacist will work closely with you and your doctors  to solve  any problems related to your medications and to help you get the   best results from taking them.      The Medication Therapy Management staff will call you to schedule an appointment.        Physical Therapy Adult Consult     Comments:    Evaluate and treat as clinically indicated.    Reason: Patient with acute on chronic dyspnea requiring PT to return to functional baseline (gait training, endurance)         Kelley Virgen, RNCC, BSN    [facility]  [address] [place] [id number]    [email or web address]    Office: [phone] Pager: [phone]  To contact weekend RNCC, dial [phone] and enter pager number [phone] at prompt. This pager can not be contacted by text page or outside line.          AVS/Discharge Summary is the source of truth; this is a helpful guide for improved communication of patient story

## 2019-09-25 NOTE — ED TRIAGE NOTES
BIBA from home c/o sudden onset of shortness of breath this morning at breakfast. EMS reports patient has a ventricular pacemaker.

## 2019-09-25 NOTE — TELEPHONE ENCOUNTER
Caller reporting the following red-flag symptom(s): can't breath, very short of breath, fainting    Per the system red-flag symptom policy, patient was instructed to:  hang up and dial 911    Action:  Patient agreed to hang up and call 911     Spoke to daughter Toya, indicated that patient was having emergent shortness of breath leading the patient to fainting. Instructed to hang up and call 911.

## 2019-09-26 ENCOUNTER — APPOINTMENT (OUTPATIENT)
Dept: CT IMAGING | Facility: CLINIC | Age: 84
DRG: 204 | End: 2019-09-26
Attending: INTERNAL MEDICINE
Payer: MEDICARE

## 2019-09-26 ENCOUNTER — APPOINTMENT (OUTPATIENT)
Dept: ULTRASOUND IMAGING | Facility: CLINIC | Age: 84
DRG: 204 | End: 2019-09-26
Attending: STUDENT IN AN ORGANIZED HEALTH CARE EDUCATION/TRAINING PROGRAM
Payer: MEDICARE

## 2019-09-26 ENCOUNTER — APPOINTMENT (OUTPATIENT)
Dept: CARDIOLOGY | Facility: CLINIC | Age: 84
DRG: 204 | End: 2019-09-26
Attending: STUDENT IN AN ORGANIZED HEALTH CARE EDUCATION/TRAINING PROGRAM
Payer: MEDICARE

## 2019-09-26 LAB
ANION GAP SERPL CALCULATED.3IONS-SCNC: 12 MMOL/L (ref 3–14)
ANION GAP SERPL CALCULATED.3IONS-SCNC: 8 MMOL/L (ref 3–14)
BASE EXCESS BLDV CALC-SCNC: 3.5 MMOL/L
BASOPHILS # BLD AUTO: 0 10E9/L (ref 0–0.2)
BASOPHILS NFR BLD AUTO: 0.6 %
BUN SERPL-MCNC: 33 MG/DL (ref 7–30)
BUN SERPL-MCNC: 34 MG/DL (ref 7–30)
CALCIUM SERPL-MCNC: 8.6 MG/DL (ref 8.5–10.1)
CALCIUM SERPL-MCNC: 9 MG/DL (ref 8.5–10.1)
CHLORIDE SERPL-SCNC: 104 MMOL/L (ref 94–109)
CHLORIDE SERPL-SCNC: 106 MMOL/L (ref 94–109)
CO2 SERPL-SCNC: 21 MMOL/L (ref 20–32)
CO2 SERPL-SCNC: 26 MMOL/L (ref 20–32)
CREAT SERPL-MCNC: 1.2 MG/DL (ref 0.52–1.04)
CREAT SERPL-MCNC: 1.26 MG/DL (ref 0.52–1.04)
DIFFERENTIAL METHOD BLD: ABNORMAL
EOSINOPHIL # BLD AUTO: 0.2 10E9/L (ref 0–0.7)
EOSINOPHIL NFR BLD AUTO: 3.2 %
ERYTHROCYTE [DISTWIDTH] IN BLOOD BY AUTOMATED COUNT: 14.7 % (ref 10–15)
ERYTHROCYTE [SEDIMENTATION RATE] IN BLOOD BY WESTERGREN METHOD: 18 MM/H (ref 0–30)
GFR SERPL CREATININE-BSD FRML MDRD: 38 ML/MIN/{1.73_M2}
GFR SERPL CREATININE-BSD FRML MDRD: 40 ML/MIN/{1.73_M2}
GLUCOSE SERPL-MCNC: 106 MG/DL (ref 70–99)
GLUCOSE SERPL-MCNC: 77 MG/DL (ref 70–99)
HCO3 BLDV-SCNC: 28 MMOL/L (ref 21–28)
HCT VFR BLD AUTO: 37.2 % (ref 35–47)
HGB BLD-MCNC: 12 G/DL (ref 11.7–15.7)
IMM GRANULOCYTES # BLD: 0 10E9/L (ref 0–0.4)
IMM GRANULOCYTES NFR BLD: 0.4 %
LACTATE BLD-SCNC: 1.5 MMOL/L (ref 0.7–2)
LYMPHOCYTES # BLD AUTO: 1.1 10E9/L (ref 0.8–5.3)
LYMPHOCYTES NFR BLD AUTO: 22.4 %
MAGNESIUM SERPL-MCNC: 1.7 MG/DL (ref 1.6–2.3)
MCH RBC QN AUTO: 32.7 PG (ref 26.5–33)
MCHC RBC AUTO-ENTMCNC: 32.3 G/DL (ref 31.5–36.5)
MCV RBC AUTO: 101 FL (ref 78–100)
MDC_IDC_LEAD_IMPLANT_DT: NORMAL
MDC_IDC_LEAD_IMPLANT_DT: NORMAL
MDC_IDC_LEAD_LOCATION: NORMAL
MDC_IDC_LEAD_LOCATION: NORMAL
MDC_IDC_LEAD_LOCATION_DETAIL_1: NORMAL
MDC_IDC_LEAD_LOCATION_DETAIL_1: NORMAL
MDC_IDC_LEAD_MFG: NORMAL
MDC_IDC_LEAD_MFG: NORMAL
MDC_IDC_LEAD_MODEL: NORMAL
MDC_IDC_LEAD_MODEL: NORMAL
MDC_IDC_LEAD_POLARITY_TYPE: NORMAL
MDC_IDC_LEAD_POLARITY_TYPE: NORMAL
MDC_IDC_LEAD_SERIAL: NORMAL
MDC_IDC_LEAD_SERIAL: NORMAL
MDC_IDC_MSMT_BATTERY_DTM: NORMAL
MDC_IDC_MSMT_BATTERY_REMAINING_LONGEVITY: 66 MO
MDC_IDC_MSMT_BATTERY_RRT_TRIGGER: 2.83
MDC_IDC_MSMT_BATTERY_STATUS: NORMAL
MDC_IDC_MSMT_BATTERY_VOLTAGE: 3 V
MDC_IDC_MSMT_LEADCHNL_RA_IMPEDANCE_VALUE: 342 OHM
MDC_IDC_MSMT_LEADCHNL_RA_IMPEDANCE_VALUE: 532 OHM
MDC_IDC_MSMT_LEADCHNL_RA_PACING_THRESHOLD_AMPLITUDE: 1 V
MDC_IDC_MSMT_LEADCHNL_RA_PACING_THRESHOLD_PULSEWIDTH: 0.4 MS
MDC_IDC_MSMT_LEADCHNL_RA_SENSING_INTR_AMPL: 1 MV
MDC_IDC_MSMT_LEADCHNL_RA_SENSING_INTR_AMPL: 1 MV
MDC_IDC_MSMT_LEADCHNL_RV_IMPEDANCE_VALUE: 361 OHM
MDC_IDC_MSMT_LEADCHNL_RV_IMPEDANCE_VALUE: 399 OHM
MDC_IDC_MSMT_LEADCHNL_RV_PACING_THRESHOLD_AMPLITUDE: 0.62 V
MDC_IDC_MSMT_LEADCHNL_RV_PACING_THRESHOLD_PULSEWIDTH: 0.4 MS
MDC_IDC_MSMT_LEADCHNL_RV_SENSING_INTR_AMPL: 17 MV
MDC_IDC_MSMT_LEADCHNL_RV_SENSING_INTR_AMPL: 17 MV
MDC_IDC_PG_IMPLANT_DTM: NORMAL
MDC_IDC_PG_MFG: NORMAL
MDC_IDC_PG_MODEL: NORMAL
MDC_IDC_PG_SERIAL: NORMAL
MDC_IDC_PG_TYPE: NORMAL
MDC_IDC_SESS_CLINIC_NAME: NORMAL
MDC_IDC_SESS_DTM: NORMAL
MDC_IDC_SESS_TYPE: NORMAL
MDC_IDC_SET_BRADY_AT_MODE_SWITCH_RATE: 171 {BEATS}/MIN
MDC_IDC_SET_BRADY_HYSTRATE: NORMAL
MDC_IDC_SET_BRADY_LOWRATE: 70 {BEATS}/MIN
MDC_IDC_SET_BRADY_MAX_SENSOR_RATE: 130 {BEATS}/MIN
MDC_IDC_SET_BRADY_MAX_TRACKING_RATE: 130 {BEATS}/MIN
MDC_IDC_SET_BRADY_MODE: NORMAL
MDC_IDC_SET_BRADY_PAV_DELAY_LOW: 180 MS
MDC_IDC_SET_BRADY_SAV_DELAY_LOW: 150 MS
MDC_IDC_SET_LEADCHNL_RA_PACING_AMPLITUDE: 2 V
MDC_IDC_SET_LEADCHNL_RA_PACING_ANODE_ELECTRODE_1: NORMAL
MDC_IDC_SET_LEADCHNL_RA_PACING_ANODE_LOCATION_1: NORMAL
MDC_IDC_SET_LEADCHNL_RA_PACING_CAPTURE_MODE: NORMAL
MDC_IDC_SET_LEADCHNL_RA_PACING_CATHODE_ELECTRODE_1: NORMAL
MDC_IDC_SET_LEADCHNL_RA_PACING_CATHODE_LOCATION_1: NORMAL
MDC_IDC_SET_LEADCHNL_RA_PACING_POLARITY: NORMAL
MDC_IDC_SET_LEADCHNL_RA_PACING_PULSEWIDTH: 0.4 MS
MDC_IDC_SET_LEADCHNL_RA_SENSING_ANODE_ELECTRODE_1: NORMAL
MDC_IDC_SET_LEADCHNL_RA_SENSING_ANODE_LOCATION_1: NORMAL
MDC_IDC_SET_LEADCHNL_RA_SENSING_CATHODE_ELECTRODE_1: NORMAL
MDC_IDC_SET_LEADCHNL_RA_SENSING_CATHODE_LOCATION_1: NORMAL
MDC_IDC_SET_LEADCHNL_RA_SENSING_POLARITY: NORMAL
MDC_IDC_SET_LEADCHNL_RA_SENSING_SENSITIVITY: 0.3 MV
MDC_IDC_SET_LEADCHNL_RV_PACING_AMPLITUDE: 1.5 V
MDC_IDC_SET_LEADCHNL_RV_PACING_ANODE_ELECTRODE_1: NORMAL
MDC_IDC_SET_LEADCHNL_RV_PACING_ANODE_LOCATION_1: NORMAL
MDC_IDC_SET_LEADCHNL_RV_PACING_CAPTURE_MODE: NORMAL
MDC_IDC_SET_LEADCHNL_RV_PACING_CATHODE_ELECTRODE_1: NORMAL
MDC_IDC_SET_LEADCHNL_RV_PACING_CATHODE_LOCATION_1: NORMAL
MDC_IDC_SET_LEADCHNL_RV_PACING_POLARITY: NORMAL
MDC_IDC_SET_LEADCHNL_RV_PACING_PULSEWIDTH: 0.4 MS
MDC_IDC_SET_LEADCHNL_RV_SENSING_ANODE_ELECTRODE_1: NORMAL
MDC_IDC_SET_LEADCHNL_RV_SENSING_ANODE_LOCATION_1: NORMAL
MDC_IDC_SET_LEADCHNL_RV_SENSING_CATHODE_ELECTRODE_1: NORMAL
MDC_IDC_SET_LEADCHNL_RV_SENSING_CATHODE_LOCATION_1: NORMAL
MDC_IDC_SET_LEADCHNL_RV_SENSING_POLARITY: NORMAL
MDC_IDC_SET_LEADCHNL_RV_SENSING_SENSITIVITY: 0.9 MV
MDC_IDC_SET_ZONE_DETECTION_INTERVAL: 350 MS
MDC_IDC_SET_ZONE_DETECTION_INTERVAL: 400 MS
MDC_IDC_SET_ZONE_TYPE: NORMAL
MDC_IDC_STAT_AT_BURDEN_PERCENT: 100 %
MDC_IDC_STAT_AT_DTM_END: NORMAL
MDC_IDC_STAT_AT_DTM_START: NORMAL
MDC_IDC_STAT_BRADY_AP_VP_PERCENT: 0.02 %
MDC_IDC_STAT_BRADY_AP_VS_PERCENT: 0 %
MDC_IDC_STAT_BRADY_AS_VP_PERCENT: 96.49 %
MDC_IDC_STAT_BRADY_AS_VS_PERCENT: 3.49 %
MDC_IDC_STAT_BRADY_DTM_END: NORMAL
MDC_IDC_STAT_BRADY_DTM_START: NORMAL
MDC_IDC_STAT_BRADY_RA_PERCENT_PACED: 0.01 %
MDC_IDC_STAT_BRADY_RV_PERCENT_PACED: 96.69 %
MDC_IDC_STAT_EPISODE_RECENT_COUNT: 0
MDC_IDC_STAT_EPISODE_RECENT_COUNT_DTM_END: NORMAL
MDC_IDC_STAT_EPISODE_RECENT_COUNT_DTM_START: NORMAL
MDC_IDC_STAT_EPISODE_TOTAL_COUNT: 0
MDC_IDC_STAT_EPISODE_TOTAL_COUNT: 0
MDC_IDC_STAT_EPISODE_TOTAL_COUNT: 4
MDC_IDC_STAT_EPISODE_TOTAL_COUNT: 601
MDC_IDC_STAT_EPISODE_TOTAL_COUNT_DTM_END: NORMAL
MDC_IDC_STAT_EPISODE_TOTAL_COUNT_DTM_START: NORMAL
MDC_IDC_STAT_EPISODE_TYPE: NORMAL
MONOCYTES # BLD AUTO: 0.7 10E9/L (ref 0–1.3)
MONOCYTES NFR BLD AUTO: 13.5 %
NEUTROPHILS # BLD AUTO: 3 10E9/L (ref 1.6–8.3)
NEUTROPHILS NFR BLD AUTO: 59.9 %
NRBC # BLD AUTO: 0 10*3/UL
NRBC BLD AUTO-RTO: 0 /100
O2/TOTAL GAS SETTING VFR VENT: ABNORMAL %
PCO2 BLDV: 43 MM HG (ref 40–50)
PH BLDV: 7.42 PH (ref 7.32–7.43)
PLATELET # BLD AUTO: 216 10E9/L (ref 150–450)
PO2 BLDV: 74 MM HG (ref 25–47)
POTASSIUM SERPL-SCNC: 4 MMOL/L (ref 3.4–5.3)
POTASSIUM SERPL-SCNC: 4.1 MMOL/L (ref 3.4–5.3)
RBC # BLD AUTO: 3.67 10E12/L (ref 3.8–5.2)
SODIUM SERPL-SCNC: 139 MMOL/L (ref 133–144)
SODIUM SERPL-SCNC: 140 MMOL/L (ref 133–144)
TSH SERPL DL<=0.005 MIU/L-ACNC: 1.13 MU/L (ref 0.4–4)
WBC # BLD AUTO: 5 10E9/L (ref 4–11)

## 2019-09-26 PROCEDURE — 25000132 ZZH RX MED GY IP 250 OP 250 PS 637: Mod: GY | Performed by: STUDENT IN AN ORGANIZED HEALTH CARE EDUCATION/TRAINING PROGRAM

## 2019-09-26 PROCEDURE — 93306 TTE W/DOPPLER COMPLETE: CPT | Mod: 26 | Performed by: INTERNAL MEDICINE

## 2019-09-26 PROCEDURE — G0378 HOSPITAL OBSERVATION PER HR: HCPCS

## 2019-09-26 PROCEDURE — 82803 BLOOD GASES ANY COMBINATION: CPT | Performed by: INTERNAL MEDICINE

## 2019-09-26 PROCEDURE — 83605 ASSAY OF LACTIC ACID: CPT | Performed by: STUDENT IN AN ORGANIZED HEALTH CARE EDUCATION/TRAINING PROGRAM

## 2019-09-26 PROCEDURE — 85025 COMPLETE CBC W/AUTO DIFF WBC: CPT | Performed by: INTERNAL MEDICINE

## 2019-09-26 PROCEDURE — 99226 ZZC SUBSEQUENT OBSERVATION CARE,LEVEL III: CPT | Mod: GC | Performed by: STUDENT IN AN ORGANIZED HEALTH CARE EDUCATION/TRAINING PROGRAM

## 2019-09-26 PROCEDURE — 36415 COLL VENOUS BLD VENIPUNCTURE: CPT | Performed by: STUDENT IN AN ORGANIZED HEALTH CARE EDUCATION/TRAINING PROGRAM

## 2019-09-26 PROCEDURE — 84443 ASSAY THYROID STIM HORMONE: CPT | Performed by: INTERNAL MEDICINE

## 2019-09-26 PROCEDURE — 25000128 H RX IP 250 OP 636: Performed by: RADIOLOGY

## 2019-09-26 PROCEDURE — 36415 COLL VENOUS BLD VENIPUNCTURE: CPT | Performed by: INTERNAL MEDICINE

## 2019-09-26 PROCEDURE — 12000001 ZZH R&B MED SURG/OB UMMC

## 2019-09-26 PROCEDURE — 83735 ASSAY OF MAGNESIUM: CPT | Performed by: STUDENT IN AN ORGANIZED HEALTH CARE EDUCATION/TRAINING PROGRAM

## 2019-09-26 PROCEDURE — 87040 BLOOD CULTURE FOR BACTERIA: CPT | Performed by: STUDENT IN AN ORGANIZED HEALTH CARE EDUCATION/TRAINING PROGRAM

## 2019-09-26 PROCEDURE — 25000125 ZZHC RX 250: Performed by: STUDENT IN AN ORGANIZED HEALTH CARE EDUCATION/TRAINING PROGRAM

## 2019-09-26 PROCEDURE — 85652 RBC SED RATE AUTOMATED: CPT | Performed by: INTERNAL MEDICINE

## 2019-09-26 PROCEDURE — 90662 IIV NO PRSV INCREASED AG IM: CPT | Performed by: INTERNAL MEDICINE

## 2019-09-26 PROCEDURE — 25000131 ZZH RX MED GY IP 250 OP 636 PS 637: Mod: GY | Performed by: STUDENT IN AN ORGANIZED HEALTH CARE EDUCATION/TRAINING PROGRAM

## 2019-09-26 PROCEDURE — G0008 ADMIN INFLUENZA VIRUS VAC: HCPCS

## 2019-09-26 PROCEDURE — 40000264 ECHOCARDIOGRAM COMPLETE

## 2019-09-26 PROCEDURE — 93970 EXTREMITY STUDY: CPT

## 2019-09-26 PROCEDURE — 71275 CT ANGIOGRAPHY CHEST: CPT

## 2019-09-26 PROCEDURE — 80048 BASIC METABOLIC PNL TOTAL CA: CPT | Performed by: STUDENT IN AN ORGANIZED HEALTH CARE EDUCATION/TRAINING PROGRAM

## 2019-09-26 PROCEDURE — 25000128 H RX IP 250 OP 636: Performed by: INTERNAL MEDICINE

## 2019-09-26 PROCEDURE — 80048 BASIC METABOLIC PNL TOTAL CA: CPT | Performed by: INTERNAL MEDICINE

## 2019-09-26 RX ORDER — IOPAMIDOL 755 MG/ML
59 INJECTION, SOLUTION INTRAVASCULAR ONCE
Status: COMPLETED | OUTPATIENT
Start: 2019-09-26 | End: 2019-09-26

## 2019-09-26 RX ORDER — TOBRAMYCIN AND DEXAMETHASONE 3; 1 MG/ML; MG/ML
1 SUSPENSION/ DROPS OPHTHALMIC
Status: DISCONTINUED | OUTPATIENT
Start: 2019-09-26 | End: 2019-10-01 | Stop reason: HOSPADM

## 2019-09-26 RX ADMIN — TOBRAMYCIN AND DEXAMETHASONE 1 DROP: 3; 1 SUSPENSION/ DROPS OPHTHALMIC at 21:52

## 2019-09-26 RX ADMIN — SENNOSIDES AND DOCUSATE SODIUM 1 TABLET: 8.6; 5 TABLET ORAL at 00:38

## 2019-09-26 RX ADMIN — Medication 1 CAPSULE: at 20:18

## 2019-09-26 RX ADMIN — INFLUENZA A VIRUS A/MICHIGAN/45/2015 X-275 (H1N1) ANTIGEN (FORMALDEHYDE INACTIVATED), INFLUENZA A VIRUS A/SINGAPORE/INFIMH-16-0019/2016 IVR-186 (H3N2) ANTIGEN (FORMALDEHYDE INACTIVATED), AND INFLUENZA B VIRUS B/MARYLAND/15/2016 BX-69A (A B/COLORADO/6/2017-LIKE VIRUS) ANTIGEN (FORMALDEHYDE INACTIVATED) 0.5 ML: 60; 60; 60 INJECTION, SUSPENSION INTRAMUSCULAR at 09:50

## 2019-09-26 RX ADMIN — HYDRALAZINE HYDROCHLORIDE 10 MG: 10 TABLET, FILM COATED ORAL at 13:41

## 2019-09-26 RX ADMIN — HYDRALAZINE HYDROCHLORIDE 10 MG: 10 TABLET, FILM COATED ORAL at 20:18

## 2019-09-26 RX ADMIN — ISOSORBIDE MONONITRATE 60 MG: 30 TABLET, EXTENDED RELEASE ORAL at 08:21

## 2019-09-26 RX ADMIN — TOBRAMYCIN AND DEXAMETHASONE 1 DROP: 3; 1 SUSPENSION/ DROPS OPHTHALMIC at 17:56

## 2019-09-26 RX ADMIN — APIXABAN 2.5 MG: 2.5 TABLET, FILM COATED ORAL at 20:18

## 2019-09-26 RX ADMIN — ASPIRIN 81 MG: 81 TABLET, COATED ORAL at 08:20

## 2019-09-26 RX ADMIN — CARVEDILOL 3.12 MG: 3.12 TABLET, FILM COATED ORAL at 17:55

## 2019-09-26 RX ADMIN — HYDRALAZINE HYDROCHLORIDE 10 MG: 10 TABLET, FILM COATED ORAL at 00:21

## 2019-09-26 RX ADMIN — Medication 1 CAPSULE: at 08:21

## 2019-09-26 RX ADMIN — LEVOTHYROXINE SODIUM 75 MCG: 75 TABLET ORAL at 08:20

## 2019-09-26 RX ADMIN — APIXABAN 2.5 MG: 2.5 TABLET, FILM COATED ORAL at 08:20

## 2019-09-26 RX ADMIN — Medication 50 MG: at 17:55

## 2019-09-26 RX ADMIN — CARVEDILOL 3.12 MG: 3.12 TABLET, FILM COATED ORAL at 08:21

## 2019-09-26 RX ADMIN — AZATHIOPRINE 50 MG: 50 TABLET ORAL at 08:20

## 2019-09-26 RX ADMIN — Medication 1 TABLET: at 08:21

## 2019-09-26 RX ADMIN — CETIRIZINE HYDROCHLORIDE 10 MG: 10 TABLET, FILM COATED ORAL at 21:52

## 2019-09-26 RX ADMIN — SENNOSIDES AND DOCUSATE SODIUM 1 TABLET: 8.6; 5 TABLET ORAL at 21:52

## 2019-09-26 RX ADMIN — FUROSEMIDE 40 MG: 20 TABLET ORAL at 08:20

## 2019-09-26 RX ADMIN — HYDRALAZINE HYDROCHLORIDE 10 MG: 10 TABLET, FILM COATED ORAL at 08:21

## 2019-09-26 RX ADMIN — IOPAMIDOL 59 ML: 755 INJECTION, SOLUTION INTRAVENOUS at 16:27

## 2019-09-26 ASSESSMENT — ACTIVITIES OF DAILY LIVING (ADL)
ADLS_ACUITY_SCORE: 17

## 2019-09-26 NOTE — SIGNIFICANT EVENT
SPIRITUAL HEALTH SERVICES Significant Event  Yarsani Sacrament of ANOINTING  [facility] ([place]) 6A    Pt accompanied by her daughter. The pt requested anointing and Communion.     The pt and her daughter shared about their strong relationship. The pt just lost her  of some 60 plus years within the last year. She shared about the blessing of being close to her six children who are a source of support. She shared about the importance of her debbie and relationship with God.    PATIENT ANOINTED and given Communion by Father Christiano Armijo 9/26/2019.    PLAN: Spiritual Health Services remains available for patient's ongoing support.    Christiano Armijo M.Div.     Pager [phone]

## 2019-09-26 NOTE — PROGRESS NOTES
"SPIRITUAL HEALTH SERVICES  SPIRITUAL ASSESSMENT Progress Note  [facility] ([place]) 6A   ON-CALL VISIT    REFERRAL SOURCE: Request at Admission    Reviewed documentation and had supportive visit with Eloy which included a reflective conversation incorporating elements of illness and family narratives. I offered supportive listening, affirmation of emotions and prayer.    Linda struggles to speak, due to breathing issues. She expressed how \"blessed\" she is with \"6 children, 10 grandchildren, and 19 greats. Each one is different and I try to another everyone of them.\"    She also spoke about having 6 children who are all Orthodox but different denominations, \"but they're all faithful and that's a great blessing.\"    [facility] [place] but she went to [facility] on Tuesday at the [facility] where she lives in [place]. Linda requested a prayer, which I offered.    Briefly explored with Linda her concern that \"Just between you and me, I think I'm dying.\"     She expressed her Bahai debbie and the sacraments bring her comfort. Linda requested the Sacrament of Anointing.       PLAN: I will arrange for Sacrament of Anointing with Fr. Alvarado. Will follow up with Linda's nurse. [facility] remains available for support during Linda's hospitalization.    Dea Olmos  Chaplain Resident  Pager  [phone]    "

## 2019-09-26 NOTE — UTILIZATION REVIEW
"Admission Status; Secondary Review Determination     Under the authority of the Utilization Management Committee, the utilization review process indicated a secondary review on the above patient. The review outcome is based on review of the medical records, discussions with staff, and applying clinical experience noted on the date of the review.     (X) Observation Status Appropriate - This patient does not meet hospital inpatient criteria and is placed in observation status. If this patient's primary payer is Medicare and was admitted as an inpatient, Condition Code 44 should be used and patient status changed to \"observation\".     RATIONALE FOR DETERMINATION: 88 year old female admitted on 9/25/2019 for dyspnea and weakness that started the day prior to admission. She has a pertinent history including RA, IPF, diastolic heart failure, HTN, CKD.  Since admission she has been afebrile, and is saturating well on RA. No leukocytosis. Labs show no hypercapnia. CRP negative. Pro-BNP mildly elevated. CXR appears stable from 7/5, and consistent with underlying IPF without obvious new opacity or effusions.  Primary team is planning on obtaining pulmonary consultation to assist in her evaluation and help determine if her symptoms may be due to IPF and if she would benefit from steroids.      The severity of illness, intensity of service provided, expected LOS and risk for adverse outcome make the care appropriate for further observation; however, doesn't meet criteria for hospital inpatient admission. This was communicated to attending physician Dr. Bansal who concurred with this determination.    The information on this document is developed by the utilization review team in order for the business office to ensure compliance. This only denotes the appropriateness of proper admission status and does not reflect the quality of care rendered. \The definitions of Inpatient Status and Observation Status used in making the " determination above are those provided in the CMS Coverage Manual, Chapter 1 and Chapter 6, section 70.4.     Sincerely,     Andrews Iniguez  Physician Advisor  Utilization Management  [facility]

## 2019-09-26 NOTE — CONSULTS
[facility] Phys[facility]  Pulmonary, Allergy, Critical Care and Sleep Medicine    Initial Consultation - 09/26/2019  Linda Spicer MRN# [id number]   Age: 88 year old YOB: 1931     Date of Admission: 9/25/2019  Reason for Consultation: Worsening shortness of breath, patient with ILD  Requesting Team: Maci Hackett  Primary Care Provider: Tre Lockett     Assessment and Recommendations:    88 year old female with a history of rheumatoid arthritis, interstitial lung disease (UIP radiology pattern), HFpEF, HTN, and CKD who presented on 9/25/2019 with worsening shortness of breath and fatigue. Patient reports that symptoms started approximately 3 weeks ago when she was diagnosed with a UTI. Although her UTI was treated at that time and she denies further dysuria, she states that since then she has not returned to her baseline. Acutely, two days ago she began to have progressively worsening shortness of breath and fatigue to the point that she struggles to hold a conversation. At baseline, she is active and able to complete basic daily tasks independently.    # Dyspnea # Fatigue #Tachypnea  Differential remains broad and includes pulmonary embolism vs systemic infection vs cardiac vs interstitial lung disease flair. PE on the DDx despite no hypoxia or tachycardia given LE swelling and dyspnea along with no clear other culprit. Pacemaker dysfunction considered but hers was interrogated without clear abnormality. Mitral valve dysfunction and right sides HF vs worsening HFpEF on the DDx and should be assessed. She has an impending sense of doom and is tachypnic with declining HCO3 so would assess of occult infection. Her normal CRP/ESR, lack of hypoxia and no clear worsening lung imaging argue against an ILD flair but this is on the DDx as well. No anemia. TSH normal.    - LE doppler ultrasound for DVT  - Recommend CT PE study  - Recommend procalcitonin, lactate, and blood cultures  - Recommend  cardiac echo    #RA #ILD - prior imaging has shown basilar predominant fibrotic disease with honeycombing with UIP pattern, patient is on azathioprine and abatacept, feels RA symptoms are well controlled      #HFpEF #Afib - on aspirin, voreg, apixaban, hydralazine, furosemide, imdur, apixaban    Seen and discussed with Dr Mooney and Dr. Von Barnhart, MS4    Chart independently reviewed by me. Note reviewed & edited by me. Pt seen, examined and discussed with medical student & attending - Jaime Mooney, [facility] Fellow [phone].    Chief Complaint and History of Present Illness:    CC:  Worsening shortness of breath    HPI: Linda Spicer is an 88 year old female with history of rheumatoid arthritis, interstitial lung disease, HFpEF, HTN, and CKD who presented on 2019 with worsening shortness of breath and fatigue. Patient reports that symptoms started approximately 3 weeks ago when she was diagnosed with a UTI. Although her UTI was treated at that time, and she denies further dysuria, she states that since then she has not returned to her baseline. Acutely, two days ago she began to have progressively worsening shortness of breath and fatigue to the point that she struggles to hold a conversation. She denies any fever, chills, cough, joint pain or swelling, nausea/vomiting, or diarrhea. Additionally, she denies any sick contacts and has no recent travel history.    In February of this year, she had her mitral valve clipped d/t severe mitral regurgitation. She states that prior to having this done, she had dyspnea that was similar to what she is experiencing how. After having the valve clipped, she was able to return to her baseline and she states that she was walking upwards of 1.5 miles per day and she was able to live independently. She is not on home oxygen, but she does state that she previously ws from [id number] when she and her now   used to spend the martino in [place]. Since  2013, she has stopped traveling to [place] and she has had no further need for supplemental oxygen.    Review of Systems:  Complete 12 point ROS negative unless mentioned in HPI    Histories, Prior to Admission Medications, Allergies:    Past Medical History:  Past Medical History:   Diagnosis Date     Adjustment disorder with anxious mood 5/18/2015     Advanced directives, counseling/discussion 8/30/2012    Patient states has Advance Directive and will bring in a copy to clinic. 8/30/2012   T[facility] Medical Assistant \       Anemia 9/25/2015     Basal cell cancer      CHF (congestive heart failure) (H) 9/18/2014     CKD (chronic kidney disease) stage 3, GFR 30-59 ml/min (H) 9/29/2015     DDD (degenerative disc disease), lumbar 9/25/2015     Diffuse idiopathic pulmonary fibrosis (H) 5/6/2013     Encounter for palliative care 5/18/2015     History of blood transfusion 9/29/2015     Hypertension goal BP (blood pressure) < 140/90 9/7/2012     Hypothyroid 9/7/2012     Irritable bowel syndrome 10/29/2013     Macular degeneration      Macular degeneration, 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     Nondisplaced spiral fracture of shaft of humerus      Osteoporosis 8/13/2013     Imo Update utility     RA (rheumatoid arthritis) (H) 5/7/2013     Rheumatic fever      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 2/20/2019     Shingles      Spinal stenosis of lumbar region with neurogenic claudication 9/14/2015     Past Surgical History:  Past Surgical History:   Procedure Laterality Date     ANESTHESIA CARDIOVERSION N/A 9/14/2018    Procedure: ANESTHESIA CARDIOVERSION;;  Surgeon: GENERIC ANESTHESIA PROVIDER;  Location: [facility] OR     ANESTHESIA CARDIOVERSION N/A 10/11/2018    Procedure: ANESTHESIA CARDIOVERSION;  Cardioversion;  Surgeon: GENERIC ANESTHESIA PROVIDER;  Location: [facility] OR     ANESTHESIA CARDIOVERSION N/A 10/16/2018    Procedure: Anesthesia Cardioversion ;  Surgeon: GENERIC ANESTHESIA PROVIDER;  Location: [facility] OR     APPENDECTOMY        BIOPSY      hemorrhoidectomy     CV HEART CATHETERIZATION WITH POSSIBLE INTERVENTION N/A 1/31/2019    Procedure: CORS,LHC,RHC-YOLANDA BEFORE CATH APPOINTMENT;  Surgeon: Avila Watson MD;  Location:  HEART CARDIAC CATH LAB     CV MITRACLIP N/A 2/11/2019    Procedure: Mitraclip Procedure;  Surgeon: Avila Watson MD;  Location: [facility]     ENT SURGERY      tonsillectomy     GYN SURGERY      3 D & C's     HYSTERECTOMY, PAP NO LONGER INDICATED       LAMINECTOMY LUMBAR ONE LEVEL N/A 10/13/2015    Procedure: LAMINECTOMY LUMBAR ONE LEVEL;  Surgeon: Fransico Toussaint MD;  Location:  OR     Past Social History:  Social History     Socioeconomic History     Marital status:      Spouse name: Not on file     Number of children: Not on file     Years of education: Not on file     Highest education level: Not on file   Occupational History     Not on file   Social Needs     Financial resource strain: Not on file     Food insecurity:     Worry: Not on file     Inability: Not on file     Transportation needs:     Medical: Not on file     Non-medical: Not on file   Tobacco Use     Smoking status: Never Smoker     Smokeless tobacco: Never Used   Substance and Sexual Activity     Alcohol use: Yes     Comment: rare wine      Drug use: No     Sexual activity: Never   Lifestyle     Physical activity:     Days per week: 0 days     Minutes per session: 0 min     Stress: Not on file   Relationships     Social connections:     Talks on phone: Not on file     Gets together: Not on file     Attends Hoahaoism service: Not on file     Active member of club or organization: Not on file     Attends meetings of clubs or organizations: Not on file     Relationship status: Not on file     Intimate partner violence:     Fear of current or ex partner: Not on file     Emotionally abused: Not on file     Physically abused: Not on file     Forced sexual activity: Not on file   Other Topics Concern     Parent/sibling w/ CABG, MI or  angioplasty before 65F 55M? No   Social History Narrative    . Has six children. She enjoys bridge and gene[employer]sfam [employer].  passed away.  Her son has financial and alcohol issues.     ETOH: none  Tobacco: never  Significant inhalational exposures: none  PPD/TB exposure status: none    Family History:  Family History   Problem Relation Age of Onset     Hypertension Mother      Psychotic Disorder Father      Diabetes Son      Diabetes Daughter      Blood Disease Daughter        Medications:    apixaban ANTICOAGULANT  2.5 mg Oral BID     aspirin  81 mg Oral Daily     azaTHIOprine  50 mg Oral Daily     calcium carbonate 600 mg-vitamin D 400 units  1 tablet Oral Daily     carvedilol  3.125 mg Oral BID w/meals     cetirizine  10 mg Oral At Bedtime     furosemide  20 mg Oral Daily at 4 pm     furosemide  40 mg Oral QAM     hydrALAZINE  10 mg Oral TID     isosorbide mononitrate  60 mg Oral Daily     levothyroxine  75 mcg Oral Daily     multivitamin  with lutein  1 capsule Oral BID     sodium chloride (PF)  3 mL Intracatheter Q8H     trimethoprim  50 mg Oral Daily     valACYclovir  1,000 mg Oral BID     carboxymethylcellulose PF, lidocaine 4%, lidocaine (buffered or not buffered), melatonin, naloxone, ondansetron **OR** ondansetron, - MEDICATION INSTRUCTIONS -, prochlorperazine **OR** prochlorperazine **OR** prochlorperazine, senna-docusate, sodium chloride (PF)    Allergies:     Allergies   Allergen Reactions     Cephalexin Diarrhea and GI Disturbance     Other reaction(s): GI Upset       Cephalexin Hcl Diarrhea     Gabapentin Other (See Comments)     Dizzsiness  Shaky, dizzy, dry mouth  Dizzsiness  Shaky,dry mouth       Methotrexate Other (See Comments)     Depleted Folic Acid  And caused severe leg cramps  Severe leg cramps     Naproxen GI Disturbance, Other (See Comments) and Nausea     Constipation. Tolerates ibuprofen.       Perfume      Sulfa Drugs Shortness Of Breath     Throat swelling     Alprazolam Other (See  Comments)     Dizziness      Lactase Other (See Comments) and Unknown     Per pt - pt has lactose intolerance and cannot drink milk. Pt can tolerate other dairy products.      Levofloxacin GI Disturbance     Macrobid [Nitrofurantoin Anhydrous]      Possibly related to lung disease      Nitrofurantoin      Possibly related to lung disease      Seasonal Allergies      Ciprofloxacin Itching and Rash       Physical Exam:    Temp:  [97.4  F (36.3  C)-98.5  F (36.9  C)] 98.1  F (36.7  C)  Pulse:  [64-71] 69  Heart Rate:  [69-76] 70  Resp:  [12-23] 18  BP: (119-153)/() 151/78  SpO2:  [97 %-100 %] 99 %     General: laying in bed in NAD  HEENT: anicteric, moist mucosa  Neck: no palpable lymphadenopathy, no JVD noted  Chest: fine dry crackles bilaterally in lung bases. Good air movement in all lung fields. No wheezing appreciated. Dyspnea unchanged by either sitting up or lying flat in bed.  Cardiac: RRR no murmurs  Abdomen: Soft, flat, non tender, active BS  Extremities: Mild LE Edema. LE tender to palpation bilaterally  Neuro: A&Ox3, no focal deficits   Skin: no rash noted    Laboratory, imaging, and microbiologic data:    Admission labs reviewed by me, notable for: WBC count WNL, BNP at patient's baseline, negative troponin, negative CRP, and a decrease in bicarb from 30 to 21 overnight    CXR reviewed by me, notable for: hazy opacities in lung bases similar to CXR from 7/5/19. Mitral valve clip visible and appears to be located properly. Dual lead pacemaker and leads visible.

## 2019-09-26 NOTE — PLAN OF CARE
Pt. admitted for dyspnea and weakness starting yesterday. VSS on RA. LS course at bases. A&Ox4. Neuros intact ex/ generalized weakness. Tolerating 1g Na diet. Voids spontaneously without difficulty. No BM this shift, PRN senna given. Up A1/GB/Walker, dyspnea on exertion. R. PIV, SL. BLE with mild edema. Had no c/o pain but did report muscle cramping to MD KATTY notified. Anticipated d/c in  4 - 7 days, to prior living arrangement once improvement in breathing symptoms and safe discharge planning. Continue to monitor and follow POC.

## 2019-09-26 NOTE — PLAN OF CARE
OBSERVATION GOAL:    Management of patient's acute on chronic dyspnea: no, SOB on exertion more than normal.

## 2019-09-26 NOTE — PLAN OF CARE
Per Mobility Level Guideline;  Bed/chair alarm No.  Patient may ambulate stand by assist  Patient requires the following assistive equipment: gait belt and walker   PT/OT consult orders in place No

## 2019-09-26 NOTE — PLAN OF CARE
Status: Patient admitted for acute on chronic COPD  Vitals: VSS, HTN within parameters. O2 sats in high 90s, on RA.   Neuros: A&Ox4. Generalized weakness otherwise intact.   IV: PIV SL.  Resp/trach: SOB on exertion.   Diet: 1G NA diet.   Bowel status: One BM this shift.   : Voiding spontaneously.  Skin: Bruised throughout.  Fragile.  Pain: Denies.  Activity: Up SBA and walker.   Social: Daughter at bedside throughout day, supportive.   Plan: ECHO needs to be completed. Continue to monitor. Plan for 4-7 more days until plan is established further.

## 2019-09-26 NOTE — PROGRESS NOTES
[facility], [place]    Progress Note - Maci 4 Service        Date of Admission:  9/25/2019    Assessment & Plan   Linda Spicer is a 88 year old female admitted on 9/25/2019. She has a pertinent history including RA c/b IPF, HFpEF (LVEF 55-60% on 7/5/2019), HTN, CKD (baseline Cr 1.4-1.6), hypothyroid, and is admitted for progressive dyspnea and weakness.    Dyspnea  Patient presents with at least a month of progressive dyspnea to the point of limiting conversation without feeling SOB. Also reporting fatigue. She has been afebrile, and is saturating well on RA. No leukocytosis. VBG WNL. CRP negative. Pro-BNP mildly elevated. TSH WNL. CXR appears stable from 7/5, but consistent with underlying IPF, and without obvious new opacity or effusions. Differential is broad, including: worsening of IPF, infection, cardiovascular cause, PE, anxiety, PAH. Vitals and labs to this point do not favor infectious cause, and patient has been on trimethoprim prophylaxis while on Imuran and with Orencia infusions. CXR and exam not convincing of fluid backing up in lungs at this time that might indicate worsening heart failure. Pulmonology is following.  - Consulted Pulm, appreciate evaluation and recs  - YOLANDA today to assess cardiac function  - Assess O2 sat with ambulation  - High res CTPE per pulm recs to assess for PE     Chronic Medical Problems  RA -continue PTA imuran, trimethoprim  CKD - stable, at baseline. Monitor AM labs  Hypothyroid -continue levothyroxine 75 mg daily. TSH 1.13.  HTN -continue PTA carvedilol, lasix (40mg qAM, 20mg qPM), imdur, ASA  Atrial fibrillation -continue PTA Eliquis 2.5 mg BID  Tachybrady syndrome s/p PPM -device interrogated without signs of malfunction  Hx MR s/p MV clip (2/2019) -monitor clinically, YOLANDA as above     Diet: 1 Gram Sodium Diet    Fluids: None, PO intake  Lines: pIV  DVT Prophylaxis: Therapeutic apixaban  Gallagher Catheter: not present  Code Status: DNR       Disposition Plan   Expected discharge: 2 - 3 days, recommended to prior living arrangement once SOB appropriately worked up.  Entered: Harsha Wilson 09/26/2019, 1:30 PM       The patient's care was discussed with the Attending Physician, Dr. Pedro Luis Bansal.    Harsha Wilson  Medical Student  Maci 4 Service  [facility], [place]  Pager: [phone]  Please see sticky note for cross cover information    Resident/Fellow Attestation   I, Isaiah Hamilton, was present with the medical student who participated in the service and in the documentation of the note.  I have verified the history and personally performed the physical exam and medical decision making.  I agree with the assessment and plan of care as documented in the note.      89 y/o F with h/o HFpEF, RF c/b ILD and CKD who presents with acute on chronic dyspnea and weakness that is progressing over the course of 1 month. She continues to be tachypneic despite normal oxygenation and no vital sign changes. Will follow up with Pulmonary recs to r/o infection, DVT/PE, RV strain in addition to other potential etiologies.    Isaiah Hamilton MD  PGY2  Date of Service (when I saw the patient): 09/26/19  ______________________________________________________________________    Interval History   Patient notes she has been having worsening shortness of breath for the past month. She has been seen several times for this symptom. On 9/9, her cardiologist adjusted her dose of lasix to decrease fluid retention. Since that time her edema has improved but her SOB has continued to worsen. She was also seen 9/5 for UTI and started on 14 day course doxycycline. She believes symptoms were significantly worse after finishing doxy and is worried they might be related.    Notes she didn't sleep well because nursing staff kept waking her. Has been eating and drinking well. Expressed concern that she is dying as she becomes so short of breath with  such simple tasks as talking. Otherwise denies headache, fever, chills, sweats, n/v, diarrhea, constipation, cough, runny nose, LE edema, orthopnea, palpitations, chest pain, or unilateral swelling of extremities.    Data reviewed today: I reviewed all medications, new labs and imaging results over the last 24 hours. I personally reviewed the EKG tracing showing ventricular paced rhythm and the chest x-ray image(s) showing stable interstitial lung disease.    Physical Exam   Vital Signs: Temp: 98.1  F (36.7  C) Temp src: Oral BP: (!) 151/78 Pulse: 69 Heart Rate: 70 Resp: 18 SpO2: 99 % O2 Device: None (Room air)    Weight: 166 lbs 6.4 oz  Constitutional: awake, alert, cooperative, mild anxiety and increased work of breathing  Eyes: Lids and lashes normal, pupils equal, sclera clear, conjunctiva normal  ENT: Normocephalic, without obvious abnormality, atraumatic, external ears without lesions  Respiratory: Coarse bibasilar crackles, worse on left side. Increased work of breathing worsens with prolonged conversation. No suprasternal or subcostal retractions or nasal flaring.  Cardiovascular: Normal apical impulse, regular rate and rhythm, normal S1 and S2, no S3 or S4, and no murmur noted  GI: No scars, normal bowel sounds, soft, non-distended, non-tender, no masses palpated, no hepatosplenomegally  Skin: no bruising or bleeding, normal skin color, texture, turgor, no redness, warmth, or swelling, no rashes and no lesions  Musculoskeletal: Swelling of finger joints bilaterally consistent with hx of rheumatic disease  Neuropsychiatric: General: normal, calm and normal eye contact  Level of consciousness: alert / normal  Affect: anxious  Memory and insight: normal, memory for past and recent events intact and thought process normal    Data   Recent Labs   Lab 09/26/19  0334 09/25/19  1020 09/20/19  0848   WBC 5.0 5.2  --    HGB 12.0 12.5  --    * 103*  --     235  --    INR  --  1.26*  --     140 140    POTASSIUM 4.0 4.0 3.7   CHLORIDE 106 106 103   CO2 21 30 32   BUN 34* 36* 41*   CR 1.20* 1.48* 1.48*   ANIONGAP 12 4 5   FATOUMATA 8.6 8.9 8.7   GLC 77 65* 111*   ALBUMIN  --  3.5  --    PROTTOTAL  --  7.2  --    BILITOTAL  --  0.5  --    ALKPHOS  --  64  --    ALT  --  29  --    AST  --  26  --    TROPI  --  <0.015  --      Recent Results (from the past 24 hour(s))   XR Chest 2 Views    Narrative    Exam: XR CHEST 2 VW, 9/25/2019 2:53 PM    Indication: shortness of breath    Comparison: 7/5/2019    Findings:   PA and lateral views of the chest. Stable positioning of left chest  wall cardiac pacemaker. Unchanged mitral clip. The trachea is midline.  Cardiac silhouette is stable. Hazy reticular opacities in the lung  bases, left greater than right, not significantly changed from prior  examination. No new airspace opacity. No pneumothorax or pleural  effusion. The visualized upper abdomen is unremarkable.      Impression    Impression: Stable appearance of bibasilar predominant interstitial  lung disease without superimposed acute airspace opacity.    I have personally reviewed the examination and initial interpretation  and I agree with the findings.    ENRRIQUE HOLCOMB MD

## 2019-09-26 NOTE — PLAN OF CARE
Status: Pt admitted from ED w/ shortness of breath and fatigue. Hx of CKD stage 3, COPD, CHF, [employer] ventricular pacemaker (2017), atrial fibrillation, s/p mitral valve clip implantation (2/11/2019), HTN, and hypothyroidism.  Recent UTI, resolved on 09/17 (see ED note).   VS: VSS.   Neuros: A/Ox4. Intact.   GI: 1g low sodium diet. Tolerating it well. Last BM this morning 09/25.   : Voiding spontaneously.  Respiratory: Coarse lung sounds. On RA, sats mid to high 90s. Pt reports hx of pulmonary fibrosis.  Shortness of breath and dyspnea on exertion. Very fatigued.   Skin: Some generalized edema noted.   IV: PIV SL.   Activity: A1, GB and W.   Pain: Denies.   Plan of care:  Pt does not currently take Valtrex and it should be discontinued. Continue to monitor and follow POC.

## 2019-09-27 ENCOUNTER — APPOINTMENT (OUTPATIENT)
Dept: PHYSICAL THERAPY | Facility: CLINIC | Age: 84
DRG: 204 | End: 2019-09-27
Attending: STUDENT IN AN ORGANIZED HEALTH CARE EDUCATION/TRAINING PROGRAM
Payer: MEDICARE

## 2019-09-27 LAB
ANION GAP SERPL CALCULATED.3IONS-SCNC: 8 MMOL/L (ref 3–14)
BACTERIA SPEC CULT: ABNORMAL
BACTERIA SPEC CULT: ABNORMAL
BUN SERPL-MCNC: 36 MG/DL (ref 7–30)
CALCIUM SERPL-MCNC: 9.2 MG/DL (ref 8.5–10.1)
CHLORIDE SERPL-SCNC: 103 MMOL/L (ref 94–109)
CO2 SERPL-SCNC: 27 MMOL/L (ref 20–32)
CREAT SERPL-MCNC: 1.42 MG/DL (ref 0.52–1.04)
ERYTHROCYTE [DISTWIDTH] IN BLOOD BY AUTOMATED COUNT: 14.6 % (ref 10–15)
GFR SERPL CREATININE-BSD FRML MDRD: 33 ML/MIN/{1.73_M2}
GLUCOSE SERPL-MCNC: 85 MG/DL (ref 70–99)
HCT VFR BLD AUTO: 39.6 % (ref 35–47)
HGB BLD-MCNC: 12.8 G/DL (ref 11.7–15.7)
Lab: ABNORMAL
MCH RBC QN AUTO: 32.7 PG (ref 26.5–33)
MCHC RBC AUTO-ENTMCNC: 32.3 G/DL (ref 31.5–36.5)
MCV RBC AUTO: 101 FL (ref 78–100)
PLATELET # BLD AUTO: 212 10E9/L (ref 150–450)
POTASSIUM SERPL-SCNC: 4.1 MMOL/L (ref 3.4–5.3)
PROCALCITONIN SERPL-MCNC: <0.05 NG/ML
RBC # BLD AUTO: 3.92 10E12/L (ref 3.8–5.2)
SODIUM SERPL-SCNC: 138 MMOL/L (ref 133–144)
SPECIMEN SOURCE: ABNORMAL
WBC # BLD AUTO: 5.2 10E9/L (ref 4–11)

## 2019-09-27 PROCEDURE — 36415 COLL VENOUS BLD VENIPUNCTURE: CPT | Performed by: STUDENT IN AN ORGANIZED HEALTH CARE EDUCATION/TRAINING PROGRAM

## 2019-09-27 PROCEDURE — 84145 PROCALCITONIN (PCT): CPT | Performed by: STUDENT IN AN ORGANIZED HEALTH CARE EDUCATION/TRAINING PROGRAM

## 2019-09-27 PROCEDURE — 25000132 ZZH RX MED GY IP 250 OP 250 PS 637: Mod: GY | Performed by: STUDENT IN AN ORGANIZED HEALTH CARE EDUCATION/TRAINING PROGRAM

## 2019-09-27 PROCEDURE — 80048 BASIC METABOLIC PNL TOTAL CA: CPT | Performed by: STUDENT IN AN ORGANIZED HEALTH CARE EDUCATION/TRAINING PROGRAM

## 2019-09-27 PROCEDURE — 25000131 ZZH RX MED GY IP 250 OP 636 PS 637: Mod: GY | Performed by: STUDENT IN AN ORGANIZED HEALTH CARE EDUCATION/TRAINING PROGRAM

## 2019-09-27 PROCEDURE — 85027 COMPLETE CBC AUTOMATED: CPT | Performed by: STUDENT IN AN ORGANIZED HEALTH CARE EDUCATION/TRAINING PROGRAM

## 2019-09-27 PROCEDURE — 97161 PT EVAL LOW COMPLEX 20 MIN: CPT | Mod: GP

## 2019-09-27 PROCEDURE — 40000275 ZZH STATISTIC RCP TIME EA 10 MIN

## 2019-09-27 PROCEDURE — G0378 HOSPITAL OBSERVATION PER HR: HCPCS

## 2019-09-27 PROCEDURE — 12000001 ZZH R&B MED SURG/OB UMMC

## 2019-09-27 PROCEDURE — 84145 PROCALCITONIN (PCT): CPT | Performed by: INTERNAL MEDICINE

## 2019-09-27 PROCEDURE — 99233 SBSQ HOSP IP/OBS HIGH 50: CPT | Mod: GC | Performed by: INTERNAL MEDICINE

## 2019-09-27 PROCEDURE — 94150 VITAL CAPACITY TEST: CPT

## 2019-09-27 RX ORDER — FUROSEMIDE 20 MG
40 TABLET ORAL
Status: DISCONTINUED | OUTPATIENT
Start: 2019-09-27 | End: 2019-10-01 | Stop reason: HOSPADM

## 2019-09-27 RX ADMIN — AZATHIOPRINE 50 MG: 50 TABLET ORAL at 09:09

## 2019-09-27 RX ADMIN — FUROSEMIDE 40 MG: 20 TABLET ORAL at 16:02

## 2019-09-27 RX ADMIN — FUROSEMIDE 40 MG: 20 TABLET ORAL at 09:08

## 2019-09-27 RX ADMIN — HYDRALAZINE HYDROCHLORIDE 10 MG: 10 TABLET, FILM COATED ORAL at 19:20

## 2019-09-27 RX ADMIN — Medication 1 TABLET: at 09:08

## 2019-09-27 RX ADMIN — ISOSORBIDE MONONITRATE 60 MG: 30 TABLET, EXTENDED RELEASE ORAL at 09:08

## 2019-09-27 RX ADMIN — HYDRALAZINE HYDROCHLORIDE 10 MG: 10 TABLET, FILM COATED ORAL at 09:09

## 2019-09-27 RX ADMIN — TOBRAMYCIN AND DEXAMETHASONE 1 DROP: 3; 1 SUSPENSION/ DROPS OPHTHALMIC at 21:47

## 2019-09-27 RX ADMIN — Medication 1 CAPSULE: at 19:21

## 2019-09-27 RX ADMIN — HYDRALAZINE HYDROCHLORIDE 10 MG: 10 TABLET, FILM COATED ORAL at 14:54

## 2019-09-27 RX ADMIN — TOBRAMYCIN AND DEXAMETHASONE 1 DROP: 3; 1 SUSPENSION/ DROPS OPHTHALMIC at 19:23

## 2019-09-27 RX ADMIN — CARVEDILOL 3.12 MG: 3.12 TABLET, FILM COATED ORAL at 19:20

## 2019-09-27 RX ADMIN — Medication 1 CAPSULE: at 09:08

## 2019-09-27 RX ADMIN — TOBRAMYCIN AND DEXAMETHASONE 1 DROP: 3; 1 SUSPENSION/ DROPS OPHTHALMIC at 14:53

## 2019-09-27 RX ADMIN — APIXABAN 2.5 MG: 2.5 TABLET, FILM COATED ORAL at 09:09

## 2019-09-27 RX ADMIN — ASPIRIN 81 MG: 81 TABLET, COATED ORAL at 09:08

## 2019-09-27 RX ADMIN — LEVOTHYROXINE SODIUM 75 MCG: 75 TABLET ORAL at 09:09

## 2019-09-27 RX ADMIN — TOBRAMYCIN AND DEXAMETHASONE 1 DROP: 3; 1 SUSPENSION/ DROPS OPHTHALMIC at 16:02

## 2019-09-27 RX ADMIN — Medication 50 MG: at 21:23

## 2019-09-27 RX ADMIN — CARVEDILOL 3.12 MG: 3.12 TABLET, FILM COATED ORAL at 09:09

## 2019-09-27 RX ADMIN — CETIRIZINE HYDROCHLORIDE 10 MG: 10 TABLET, FILM COATED ORAL at 21:23

## 2019-09-27 RX ADMIN — SENNOSIDES AND DOCUSATE SODIUM 1 TABLET: 8.6; 5 TABLET ORAL at 21:23

## 2019-09-27 RX ADMIN — APIXABAN 2.5 MG: 2.5 TABLET, FILM COATED ORAL at 19:20

## 2019-09-27 RX ADMIN — TOBRAMYCIN AND DEXAMETHASONE 1 DROP: 3; 1 SUSPENSION/ DROPS OPHTHALMIC at 09:10

## 2019-09-27 NOTE — PLAN OF CARE
PT 6A: Evaluation completed and treatment initiated.   Discharge Planner PT   Patient plan for discharge: Unsure, doesn't feel ready to return to \[facility]\""  Current status: Very limited by tachypnea, anxiety and fatigue; VSS on room air with the minimal activity she tolerated.  Amb x20ft, sat to rest, then x20ft back to room with 4WW, supervision.  RR 45-55rpm with activity, 35-45rpm at rest.  Will be curious to see results of PFT's.    Barriers to return to prior living situation: Lacking endurance for apartment mobility, needing setup for ADLs  Recommendations for discharge: TCU  Rationale for recommendations: Pt currently cannot function in her apartment as her endurance for activity is so low.  She would benefit significantly from breathing training, progressive activity, energy conservation, ADL and gait training in TCU setting.  She appears to be at a high risk for readmission.         Entered by: Jaki Chavez 09/27/2019 3:22 PM

## 2019-09-27 NOTE — PROGRESS NOTES
09/27/19 1505   Quick Adds   Type of Visit Initial PT Evaluation   Living Environment   Lives With alone   Living Arrangements independent living facility   Home Accessibility no concerns   Living Environment Comment Typically makes her own meals then takes them down to dining magallanes to socialize.   Self-Care   Usual Activity Tolerance moderate   Current Activity Tolerance fair   Regular Exercise No   Equipment Currently Used at Home raised toilet;shower chair;walker, rolling  (4WW)   Functional Level Prior   Ambulation 1-->assistive equipment   Transferring 0-->independent   Toileting 1-->assistive equipment   Bathing 1-->assistive equipment   Communication 0-->understands/communicates without difficulty   Swallowing 0-->swallows foods/liquids without difficulty   Cognition 0 - no cognition issues reported   Fall history within last six months no   Prior Functional Level Comment Symptoms onset in last week and are very distressing.  Pt is typically active in her community, playing bingo and going for walks.     General Information   Onset of Illness/Injury or Date of Surgery - Date 09/25/19   Referring Physician Isaiah Hamilton MD   Patient/Family Goals Statement family doesn't feel pt is able to discharge home, also concerned about coverage for TCU   Pertinent History of Current Problem (include personal factors and/or comorbidities that impact the POC) 88 year old female admitted on 9/25/2019. She has a pertinent history including RA c/b IPF, HFpEF (LVEF 55-60% on 7/5/2019), HTN, CKD (baseline Cr 1.4-1.6), hypothyroid, and is admitted for dyspnea and weakness starting yesterday.   Precautions/Limitations fall precautions   General Observations pt is very tachypneic - RR 40-50bpm at rest however vitals are stable on room air   Cognitive Status Examination   Orientation orientation to person, place and time   Level of Consciousness alert   Follows Commands and Answers Questions 100% of the time   Personal  Safety and Judgment intact   Memory intact   Cognitive Comment Pt is very anxious. had recent life stressor with the anniversary of her 's death and also putting his gravestone into the ground.  Also has been talking about her mortality.   Pain Assessment   Patient Currently in Pain No   Integumentary/Edema   Integumentary/Edema no deficits were identifed   Posture    Posture Forward head position;Protracted shoulders   Range of Motion (ROM)   ROM Quick Adds No deficits were identified   Strength   Manual Muscle Testing Quick Adds No deficits were identified   Bed Mobility   Bed Mobility Comments mod (I)   Transfer Skills   Transfer Comments mod (I) 4WW   Gait   Gait Comments distance very limited by tachypnea and fatigue.  uses 4WW appropriately, slow gait speed, kyphotic.  seated rest after x20ft   Balance   Balance Comments intact sitting, needs UE support in standing 2/2 unsteadiness from tachypnea   Sensory Examination   Sensory Perception no deficits were identified   Coordination   Coordination no deficits were identified   Muscle Tone   Muscle Tone no deficits were identified   General Therapy Interventions   Planned Therapy Interventions home program guidelines;progressive activity/exercise;risk factor education   Clinical Impression   Criteria for Skilled Therapeutic Intervention yes, treatment indicated   PT Diagnosis impaired functional mobility   Influenced by the following impairments tachypnea, posture, anxiety   Functional limitations due to impairments decreased (I) energy conservation, gait endurance, ADL   Clinical Presentation Stable/Uncomplicated   Clinical Presentation Rationale labs, imaging, vitals all benign   Clinical Decision Making (Complexity) Low complexity   Therapy Frequency Daily   Predicted Duration of Therapy Intervention (days/wks) 2 days   Anticipated Discharge Disposition Transitional Care Facility   Risk & Benefits of therapy have been explained Yes   Patient, Family &  "other staff in agreement with plan of care Yes   [facility] AM-PAC  \"6 Clicks\" V.2 Basic Mobility Inpatient Short Form   1. Turning from your back to your side while in a flat bed without using bedrails? 4 - None   2. Moving from lying on your back to sitting on the side of a flat bed without using bedrails? 4 - None   3. Moving to and from a bed to a chair (including a wheelchair)? 4 - None   4. Standing up from a chair using your arms (e.g., wheelchair, or bedside chair)? 4 - None   5. To walk in hospital room? 4 - None   6. Climbing 3-5 steps with a railing? 1 - Total   Basic Mobility Raw Score (Score out of 24.Lower scores equate to lower levels of function) 21   Total Evaluation Time   Total Evaluation Time (Minutes) 20     "

## 2019-09-27 NOTE — PLAN OF CARE
Status: Pt admitted for acute on chronic COPD  Vitals: VSS.   Neuros: A/Ox4, denies n/t, generalized weakness  IV: PIV SL, patent with flush.  Resp/trach: SOB on exertion, RA. Sats high 90's at rest and with activity.   Diet: 1gm Na Diet  Bowel status: No BM this shift, BS+  : Voiding spontaneously   Skin: Fragile with bruising throughout   Pain: Denies  Activity: SBA/walker/GB. PT assessed patient.  Plan: Continue to monitor and assess. Encourage activity in magallanes.

## 2019-09-27 NOTE — PLAN OF CARE
Status: Patient admitted for acute on chronic COPD.   Vitals: VSS, HTN within parameters. O2 sats in high 90s, on RA.   Neuros: A&Ox4. Generalized weakness otherwise intact.   IV: PIV SL.  Resp/trach: SOB on exertion, pt reports it is improving.   Diet: 1G NA diet. Tolerating it well.   Bowel status: One BM this shift.   : Voiding spontaneously.  Skin: Bruised throughout. Fragile. Age related changes.   Pain: Denies.  Activity: Up SBA and walker.   Social: Son at bedside for part of the shift, very supportive.   Plan: Pt completed Echo, CT chest (see results) and LE US (negative for DVT) this shift. Pulmonary has been consulted. Continue to monitor. Plan for 4-7 more days until plan is further established.

## 2019-09-27 NOTE — PROGRESS NOTES
[facility]  Pulmonary, Allergy, Critical Care and Sleep Medicine  Follow-up Note - 09/27/2019    Assessment and Recommendations:    88 year old female with a history of rheumatoid arthritis, interstitial lung disease (UIP radiology pattern), HFpEF, HTN, and CKD who presented on 9/25/2019 with worsening shortness of breath and fatigue. Patient reports that symptoms started approximately 3 weeks ago when she was diagnosed with a UTI. Although her UTI was treated at that time and she denies further dysuria, she states that since then she has not returned to her baseline. Acutely, two days ago she began to have progressively worsening shortness of breath and fatigue to the point that she struggles to hold a conversation. At baseline, she is active and able to complete basic daily tasks independently.     # Dyspnea # Fatigue #Tachypnea - there is discrepancy between the symptoms reported by the patient and what is noted by staff. Has walked to the bathroom and around the wards without evident difficulty and no decrease in SPO2 but the patient feels that she is having to stop due to dyspnea. Echo and CT PE both done and overall unremarkable - there was a filling defect but thought likely not a PE. LE US negative. The CT shows unchanged basilar and peripheral fibrosis - stable inflammatory markers also argue against an ILD flare. TSH normal. No anemia.     Patient may have JESUS MANUEL resulting in chronic sense of fatigue. Recommend trial of BiPAP (could aim for around 350 ml with setting of 8/4 to start, RT to assist in titration). Also recommending mental health evaluation if deemed appropriate by the primary team. Order bedside spirometry to ensure no fixed or variable obstruction and can perform a negative inspiratory force - this may however be invalid if not adequate patient effort.      #RA #ILD - prior imaging has shown basilar predominant fibrotic disease with honeycombing with UIP pattern, patient  is on azathioprine and abatacept, feels RA symptoms are well controlled       #HFpEF #Afib - on aspirin, coreg, apixaban, hydralazine, furosemide, imdur     Seen and discussed with Dr Mooney and Dr. Von Barnhart, MS4    Chart independently reviewed by me. Note reviewed & edited by me. Pt seen, examined and discussed with medical student & attending - Jaime Mooney, [facility] Fellow [phone].     Subjective, Interval history:   Overnight, patient continues to have subjective dyspnea. It was noted by nursing staff that while walking patient, she claimed to become profoundly shortness of breath and weak and needed to rest. Her SaO2 was noted to be >95% throughout this episode.    Objective:   Medications:    apixaban ANTICOAGULANT  2.5 mg Oral BID     aspirin  81 mg Oral Daily     azaTHIOprine  50 mg Oral Daily     calcium carbonate 600 mg-vitamin D 400 units  1 tablet Oral Daily     carvedilol  3.125 mg Oral BID w/meals     cetirizine  10 mg Oral At Bedtime     furosemide  40 mg Oral BID     hydrALAZINE  10 mg Oral TID     isosorbide mononitrate  60 mg Oral Daily     levothyroxine  75 mcg Oral Daily     multivitamin  with lutein  1 capsule Oral BID     sodium chloride (PF)  3 mL Intracatheter Q8H     tobramycin-dexamethasone  1 drop Both Eyes Q4H While awake     trimethoprim  50 mg Oral Daily     carboxymethylcellulose PF, lidocaine 4%, lidocaine (buffered or not buffered), melatonin, naloxone, ondansetron **OR** ondansetron, - MEDICATION INSTRUCTIONS -, prochlorperazine **OR** prochlorperazine **OR** prochlorperazine, senna-docusate, sodium chloride (PF)    Physical Exam:  Temp:  [97.6  F (36.4  C)-98  F (36.7  C)] 97.6  F (36.4  C)  Pulse:  [76] 76  Heart Rate:  [70] 70  Resp:  [16-18] 16  BP: (115-141)/(53-80) 132/60  SpO2:  [97 %-99 %] 99 %    General: laying in bed shortness of breath limiting speech  HEENT: anicteric, moist mucosa  Neck: no palpable lymphadenopathy, no JVD noted  Chest: Fine crackles  bilaterally in lung bases.  Cardiac: RRR no murmurs  Abdomen: Soft, flat, non tender, active BS  Extremities: Minimal LE edema, less tender to palpation that on previous exams.  Neuro: A&Ox3, no focal deficits   Skin: no rash noted    Labs and imaging: Reviewed. Notable labs and imaging interpretation mentioned in assessment.

## 2019-09-27 NOTE — PROGRESS NOTES
[facility], [place]    Progress Note - Maci 4 Service        Date of Admission:  9/25/2019    Assessment & Plan   Linda Spicer is a 88 year old female admitted on 9/25/2019. She has a pertinent history including RA c/b IPF, HFpEF (LVEF 55-60% on 7/5/2019), HTN, CKD (baseline Cr 1.4-1.6), hypothyroid, and is admitted for progressive dyspnea and weakness.    Dyspnea  Patient presents with at least a month of progressive dyspnea to the point of limiting conversation without feeling SOB. Also reporting fatigue. She has been afebrile, and is saturating well on RA. No leukocytosis. VBG WNL. CRP negative. Pro-BNP mildly elevated. TSH WNL. CTPE 9/26 w/o evidence of PE and stable interstitial disease. BLE US 9/26 w/o evidence of DVT. Differential remains broad, including: worsening of IPF, infection, cardiovascular cause, anxiety, PAH. Vitals and labs to this point do not favor infectious cause, and patient has been on trimethoprim prophylaxis while on Imuran and with Orencia infusions. Imaging and exam not convincing of fluid backing up in lungs at this time that might indicate worsening heart failure. Pulmonology is following.  - Consulted Pulm, appreciate evaluation and recs  - Will assess O2 sats with ambulation today  - Increase lasix to 40mg BID and assess for changes     Chronic Medical Problems  RA -continue PTA imuran, trimethoprim  CKD - stable, at baseline. Monitor AM labs  Hypothyroid -continue levothyroxine 75 mg daily. TSH 1.13.  HTN -continue PTA carvedilol, lasix (40mg qAM, 20mg qPM), imdur, ASA  Atrial fibrillation -continue PTA Eliquis 2.5 mg BID  Tachybrady syndrome s/p PPM -device interrogated without signs of malfunction  Hx MR s/p MV clip (2/2019) -monitor clinically, YOLANDA as above     Diet: 1 Gram Sodium Diet    Fluids: None, PO intake  Lines: pIV  DVT Prophylaxis: Therapeutic apixaban  Gallagher Catheter: not present  Code Status: DNR      Disposition Plan   Expected  discharge: 2 - 3 days, recommended to prior living arrangement once SOB appropriately worked up.  Entered: Harsha Wilson 09/27/2019, 10:52 AM       The patient's care was discussed with the Patient and Primary team. Discussed case with Dr. Salamanca.    Harsha Wilson  Medical Student  Maci 4 Service  [facility], [place]  Pager: [phone]  Please see sticky note for cross cover information    Resident/Fellow Attestation   I, Isaiah Hamilton, was present with the medical student who participated in the service and in the documentation of the note.  I have verified the history and personally performed the physical exam and medical decision making.  I agree with the assessment and plan of care as documented in the note.      Patient reports that her breathing is slightly improved from yesterday. She continues to deny CP or wheezing; however, she demonstrates a degree of anxiety regarding her overall disposition when leaving the hospital. Physical exam unchanged from yesterday. Patient will be assessed by PT to see if she will be eligible for TCU placement.    Isaiah Hamilton MD  PGY2  Date of Service (when I saw the patient): 09/27/19  ______________________________________________________________________    Interval History   Nursing notes reviewed with no acute events overnight. She slept well as she had fewer interruptions than the night before. Has been eating and drinking well. Believes her SOB has improved from yesterday, which she attributes to being able to rest more. Expressed concern that we won't find the exact diagnosis and therefore will just send her home without fixing her. Had discussion about chronic nature of her conditions and goals for symptom management. Mentioned she is not sure if she would even take steroids if they were offered due to poor experiences in the past. Otherwise denies headache, fever, chills, sweats, n/v, diarrhea, constipation, cough, runny nose, LE  edema, orthopnea, palpitations, chest pain, unilateral swelling of extremities, rash.    Data reviewed today: I reviewed all medications, new labs and imaging results over the last 24 hours. I personally reviewed the chest CT image(s) showing no concern for PE and stable interstitial disease and the BLE US image(s) showing no evidence of DVT.    Physical Exam   Vital Signs: Temp: 97.6  F (36.4  C) Temp src: Oral BP: 139/80   Heart Rate: 70 Resp: 16 SpO2: 99 % O2 Device: None (Room air)    Weight: 166 lbs 6.4 oz  Constitutional: awake, alert, cooperative, mild anxiety and increased work of breathing  Eyes: Lids and lashes normal, pupils equal, sclera clear, conjunctiva normal  ENT: Normocephalic, without obvious abnormality, atraumatic, external ears without lesions  Respiratory: Coarse bibasilar crackles, worse on left side. Increased work of breathing worsens with prolonged conversation, but improved from yesterday. No suprasternal or subcostal retractions or nasal flaring.  Cardiovascular: Normal apical impulse, regular rate and rhythm, normal S1 and S2, no S3 or S4, and no murmur noted  GI: No scars, normal bowel sounds, soft, non-distended, non-tender, no masses palpated, no hepatosplenomegally  Skin: no bruising or bleeding, normal skin color, texture, turgor, no redness, warmth, or swelling, no rashes and no lesions  Musculoskeletal: Swelling of finger joints bilaterally consistent with hx of rheumatic disease  Neuropsychiatric: General: normal, calm and normal eye contact  Level of consciousness: alert / normal  Affect: anxious  Memory and insight: normal, memory for past and recent events intact and thought process normal    Data   Recent Labs   Lab 09/27/19  0920 09/27/19  0706 09/26/19  1555 09/26/19  0334 09/25/19  1020   WBC 5.2  --   --  5.0 5.2   HGB 12.8  --   --  12.0 12.5   *  --   --  101* 103*     --   --  216 235   INR  --   --   --   --  1.26*   NA  --  138 139 140 140   POTASSIUM   --  4.1 4.1 4.0 4.0   CHLORIDE  --  103 104 106 106   CO2  --  27 26 21 30   BUN  --  36* 33* 34* 36*   CR  --  1.42* 1.26* 1.20* 1.48*   ANIONGAP  --  8 8 12 4   FATOUMATA  --  9.2 9.0 8.6 8.9   GLC  --  85 106* 77 65*   ALBUMIN  --   --   --   --  3.5   PROTTOTAL  --   --   --   --  7.2   BILITOTAL  --   --   --   --  0.5   ALKPHOS  --   --   --   --  64   ALT  --   --   --   --  29   AST  --   --   --   --  26   TROPI  --   --   --   --  <0.015     Recent Results (from the past 24 hour(s))   CT Chest Pulmonary Embolism w Contrast    Narrative    CT CHEST PULMONARY EMBOLISM W CONTRAST, 9/26/2019 4:37 PM    History: Shortness of breath    Comparison: 9/25/2019    Technique: Helical acquisition of CT images of the chest from the lung  apices to the kidneys were acquired after the administration of  intravenous contrast according to the CT pulmonary angiogram protocol.  Axial images were reconstructed in 1 and 3 mm slice thickness. Coronal  reconstructions performed. Three-dimensional (3D) post-processed  angiographic images were reconstructed, archived to PACS and used in  the interpretation of this study    Findings:     The contrast bolus is adequate. Single filling defect within the left  lower lobar segmental artery, with lack of contrast opacification  within the distal subsegmental lower lumbar branches, favored to  represent contrast bolus artifact and contrast admixture. No  additional filling defects to suggest pulmonary emboli.    Lung Parenchyma:  The central tracheobronchial tree is patent. No pneumothorax or  pleural effusion. No focal airspace consolidation. No significant  change in peripheral and basilar predominant reticulations,  architectural distortion, and traction bronchiectasis. Basilar  predominant honeycombing is again noted and stable as compared to  prior examination. No groundglass component. Unchanged 5 mm pulmonary  nodule in the right lower lobe (series 10, image 106). No new or  enlarging  pulmonary nodule.    Mediastinum:  Visualized portions of the thyroid gland are unremarkable in  appearance. Stable enlargement of the heart, without pericardial  effusion. Mild coronary artery calcifications. The thoracic vessels  are normal in caliber and configuration. No suspicious thoracic  adenopathy. Patulous esophagus.    Upper abdomen:  Limited evaluation of the upper abdomen by contrast, bolus timing and  coverage. Reflux of contrast into the hepatic veins. The adrenal  glands appear within normal limits. 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 No acute findings within the visualized upper abdomen.    Bones and soft tissues:  No acute or suspicious osseous findings.      Impression    Impression:  1. Single filling defect is seen within the left lower lobar pulmonary  artery, in a region of severe interstitial lung disease, favored to  represent combination of contrast bolus timing artifact, contrast  administration, and slow flow. Pulmonary embolus is considered less  likely, particularly in the setting of negative DVT ultrasound. No  additional filling defects to suggest pulmonary embolus.  2. Stable appearance of bibasilar and peripheral predominant  interstitial lung disease, most compatible with UIP pattern. No CT  evidence of acute interstitial lung disease exacerbation.  3. Stable 5 mm pulmonary nodule in the right lower lobe.    I have personally reviewed the examination and initial interpretation  and I agree with the findings.    ENRRIQUE HOLCOMB MD   US Lower Extremity Venous Duplex Bilateral    Narrative    EXAMINATION: DOPPLER VENOUS ULTRASOUND OF BILATERAL LOWER EXTREMITIES,  9/26/2019 4:56 PM     COMPARISON: 2/20/2019    HISTORY: 89 y/o F h/o RF c/b ILD, in addition to HFpEF admitted for  acute on chronic dyspnea. Please assess for DVT given finding of  asymmetric calf edema.    TECHNIQUE:  Gray-scale evaluation with compression, spectral flow and  color Doppler assessment of the deep venous  system of both legs from  groin to knee, and then at the ankles.    FINDINGS:  In both lower extremities, the common femoral, femoral, popliteal and  posterior tibial veins demonstrate normal compressibility and blood  flow.      Impression    IMPRESSION: No evidence of deep venous thrombosis in either lower  extremity.    I have personally reviewed the examination and initial interpretation  and I agree with the findings.    ENRRIQUE HOLCOMB MD

## 2019-09-27 NOTE — PROGRESS NOTES
Care Coordinator - Progress Note    Admission Date/Time:  2019  Attending MD:  Pedro Luis Bansal*     Data  Date of initial CC assessment:  2019  Patient was admitted for:   1. Dyspnea on exertion    2. Acute on chronic right-sided congestive heart failure (H)    3. Atrial fibrillation, unspecified type (H)    4. Long term (current) use of anticoagulants    5. Cardiac pacemaker in situ         Assessment   Concerns with insurance coverage for discharge needs: None.    Current Living Situation (CLARIFIED): Patient lives alone in an independent living apartment at [facility]. (Patient IS NOT in the prison portion of the facility. Patient does not receive services of any kind from the [facility] staff).    Support System: Supportive and Involved; son an daughter-in-law ( is  about 1 year ago)  Services Involved: Home Care  Transportation at Discharge: Family or friend will provide  Transportation to Medical Appointments:  - Name of caregiver: adult children  Barriers to Discharge: medical needs      Coordination of Care  D: Plan of care discussed with Medical Team at Interdisciplinary Rounds. Physician stated if patient symptoms improve expect to dc over the weekend. If patient symptoms do not improve plan for change to inpatient and continue workup for definitive cause.      I/A: Chart reviewed; spoke with staff at [facility] (Ph: [phone]) to verify patient may return home on weekends. Staff stated patient lives in an independent apartment and receives no services from them, therefore patient may return at will.     Patient had difficulty speaking to writer due to shortness of breath 2/2 speaking (seated in a chair, without movement). Patient is a very pleasant elderly woman who enjoys bingo and playing bridge with other residents in her building. Patient is very adherent to her 1 gram per day sodium restriction. She reports making ALL her  own meals and bringing her food down to the dining room to share the company of others.     Previously pt has been open to Home Care services through [facility], she would like to start services with them again (orders completed)., stating she feels it would be very helpful considering her current condition.    Patient reports she feels like she is dying. Broadly opened a discussion regarding Palliative Care and asked if patient would be interested in speaking to them. Patient stated that would probably be a good idea but I would like to discuss it with my doctor ([facility] - Dr. Isaiah Hamilton) first. Reported discussion with Dr Hamilton.    P: Care Coordinator will remain available for discharge needs that may arise.      Resumptions:     [facility]  Ph: [phone]   Fax: [phone]    RN skilled nursing visit.   RN to assess vital signs and weight, respiratory and cardiac status, pain level and activity tolerance, hydration, nutrition and bowel status   RN to complete home safety evaluation.  RN to complete weekly medication management and set-up     Home health aide to assist patient with activities of daily living (bathing, dressing, grooming, etc) three times per week      Plan  Anticipated Discharge Date:  Monday  Anticipated Discharge Plan:  Home to ind apartment        Jaki Keller RN, BSN, PHN  Medicine Care Coordinator  Desk Phone: [phone]  Pager: [phone]    To contact Weekend RNCC, dial * [phone] and enter job [phone] at prompt.   This pager can not be contacted by text page or outside line.

## 2019-09-27 NOTE — PLAN OF CARE
Status: Pt admitted for acute on chronic COPD  Vitals: HTN within parameters   Neuros: A/Ox4, denies n/t, generalized weakness  IV: PIV SL  Resp/trach: SOB on exertion, RA  Diet: 1gm Na Diet  Bowel status: No BM this shift, BS+  : Voiding spontaneously   Skin: Fragile with bruising throughout   Pain: Denies  Activity: SBA/walker  Plan: Continue to monitor and assess.

## 2019-09-28 LAB
ANION GAP SERPL CALCULATED.3IONS-SCNC: 10 MMOL/L (ref 3–14)
BUN SERPL-MCNC: 45 MG/DL (ref 7–30)
CALCIUM SERPL-MCNC: 8.8 MG/DL (ref 8.5–10.1)
CHLORIDE SERPL-SCNC: 103 MMOL/L (ref 94–109)
CO2 SERPL-SCNC: 27 MMOL/L (ref 20–32)
CREAT SERPL-MCNC: 1.31 MG/DL (ref 0.52–1.04)
ERYTHROCYTE [DISTWIDTH] IN BLOOD BY AUTOMATED COUNT: 14.5 % (ref 10–15)
GFR SERPL CREATININE-BSD FRML MDRD: 36 ML/MIN/{1.73_M2}
GLUCOSE SERPL-MCNC: 83 MG/DL (ref 70–99)
HCT VFR BLD AUTO: 38.4 % (ref 35–47)
HGB BLD-MCNC: 12.6 G/DL (ref 11.7–15.7)
MCH RBC QN AUTO: 33 PG (ref 26.5–33)
MCHC RBC AUTO-ENTMCNC: 32.8 G/DL (ref 31.5–36.5)
MCV RBC AUTO: 101 FL (ref 78–100)
PLATELET # BLD AUTO: 217 10E9/L (ref 150–450)
POTASSIUM SERPL-SCNC: 3.8 MMOL/L (ref 3.4–5.3)
RBC # BLD AUTO: 3.82 10E12/L (ref 3.8–5.2)
SODIUM SERPL-SCNC: 140 MMOL/L (ref 133–144)
WBC # BLD AUTO: 5.4 10E9/L (ref 4–11)

## 2019-09-28 PROCEDURE — 12000001 ZZH R&B MED SURG/OB UMMC

## 2019-09-28 PROCEDURE — 25000131 ZZH RX MED GY IP 250 OP 636 PS 637: Mod: GY | Performed by: STUDENT IN AN ORGANIZED HEALTH CARE EDUCATION/TRAINING PROGRAM

## 2019-09-28 PROCEDURE — 99233 SBSQ HOSP IP/OBS HIGH 50: CPT | Mod: GC | Performed by: STUDENT IN AN ORGANIZED HEALTH CARE EDUCATION/TRAINING PROGRAM

## 2019-09-28 PROCEDURE — 40000275 ZZH STATISTIC RCP TIME EA 10 MIN

## 2019-09-28 PROCEDURE — 80048 BASIC METABOLIC PNL TOTAL CA: CPT | Performed by: STUDENT IN AN ORGANIZED HEALTH CARE EDUCATION/TRAINING PROGRAM

## 2019-09-28 PROCEDURE — 25000132 ZZH RX MED GY IP 250 OP 250 PS 637: Mod: GY | Performed by: STUDENT IN AN ORGANIZED HEALTH CARE EDUCATION/TRAINING PROGRAM

## 2019-09-28 PROCEDURE — 36415 COLL VENOUS BLD VENIPUNCTURE: CPT | Performed by: STUDENT IN AN ORGANIZED HEALTH CARE EDUCATION/TRAINING PROGRAM

## 2019-09-28 PROCEDURE — G0378 HOSPITAL OBSERVATION PER HR: HCPCS

## 2019-09-28 PROCEDURE — 85027 COMPLETE CBC AUTOMATED: CPT | Performed by: STUDENT IN AN ORGANIZED HEALTH CARE EDUCATION/TRAINING PROGRAM

## 2019-09-28 RX ADMIN — SENNOSIDES AND DOCUSATE SODIUM 1 TABLET: 8.6; 5 TABLET ORAL at 22:39

## 2019-09-28 RX ADMIN — Medication 1 CAPSULE: at 08:51

## 2019-09-28 RX ADMIN — TOBRAMYCIN AND DEXAMETHASONE 1 DROP: 3; 1 SUSPENSION/ DROPS OPHTHALMIC at 12:58

## 2019-09-28 RX ADMIN — ISOSORBIDE MONONITRATE 60 MG: 30 TABLET, EXTENDED RELEASE ORAL at 08:51

## 2019-09-28 RX ADMIN — TOBRAMYCIN AND DEXAMETHASONE 1 DROP: 3; 1 SUSPENSION/ DROPS OPHTHALMIC at 20:25

## 2019-09-28 RX ADMIN — HYDRALAZINE HYDROCHLORIDE 10 MG: 10 TABLET, FILM COATED ORAL at 20:23

## 2019-09-28 RX ADMIN — CETIRIZINE HYDROCHLORIDE 10 MG: 10 TABLET, FILM COATED ORAL at 22:35

## 2019-09-28 RX ADMIN — HYDRALAZINE HYDROCHLORIDE 10 MG: 10 TABLET, FILM COATED ORAL at 13:27

## 2019-09-28 RX ADMIN — Medication 1 CAPSULE: at 20:23

## 2019-09-28 RX ADMIN — TOBRAMYCIN AND DEXAMETHASONE 1 DROP: 3; 1 SUSPENSION/ DROPS OPHTHALMIC at 08:57

## 2019-09-28 RX ADMIN — CARVEDILOL 3.12 MG: 3.12 TABLET, FILM COATED ORAL at 08:51

## 2019-09-28 RX ADMIN — CARVEDILOL 3.12 MG: 3.12 TABLET, FILM COATED ORAL at 17:37

## 2019-09-28 RX ADMIN — HYDRALAZINE HYDROCHLORIDE 10 MG: 10 TABLET, FILM COATED ORAL at 08:52

## 2019-09-28 RX ADMIN — APIXABAN 2.5 MG: 2.5 TABLET, FILM COATED ORAL at 20:24

## 2019-09-28 RX ADMIN — Medication 50 MG: at 17:41

## 2019-09-28 RX ADMIN — Medication 1 TABLET: at 08:51

## 2019-09-28 RX ADMIN — FUROSEMIDE 40 MG: 20 TABLET ORAL at 08:52

## 2019-09-28 RX ADMIN — FUROSEMIDE 40 MG: 20 TABLET ORAL at 17:37

## 2019-09-28 RX ADMIN — LEVOTHYROXINE SODIUM 75 MCG: 75 TABLET ORAL at 08:51

## 2019-09-28 RX ADMIN — APIXABAN 2.5 MG: 2.5 TABLET, FILM COATED ORAL at 08:56

## 2019-09-28 RX ADMIN — ASPIRIN 81 MG: 81 TABLET, COATED ORAL at 08:51

## 2019-09-28 RX ADMIN — AZATHIOPRINE 50 MG: 50 TABLET ORAL at 08:56

## 2019-09-28 ASSESSMENT — ACTIVITIES OF DAILY LIVING (ADL)
ADLS_ACUITY_SCORE: 17
ADLS_ACUITY_SCORE: 17

## 2019-09-28 NOTE — PROGRESS NOTES
OBSERVATION GOAL:     Management of patient's acute on chronic dyspnea: no, still experiencing dyspnea on exertion.

## 2019-09-28 NOTE — DISCHARGE SUMMARY
[facility], [place]  Discharge Summary - Medicine & Pediatrics       Date of Admission:  9/25/2019  Date of Discharge:  10/1/2019  Discharging Provider: Isaiah Hamilton MD  Discharge Service: Maci 4    Discharge Diagnoses   Acute on chronic dyspnea  Congestive Heart failure with preserved ejection fraction    Follow-ups Needed After Discharge       Unresulted Labs Ordered in the Past 30 Days of this Admission     Date and Time Order Name Status Description    9/26/2019 1534 Blood culture Preliminary     9/26/2019 1534 Blood culture Preliminary       These results will be followed up by patient's primary care provider    Discharge Disposition   Discharged to rehabilitation facility  Condition at discharge: Stable    Hospital Course   Linda Spicer was admitted on 9/25/2019 for acute on chronic dyspnea.  The following problems were addressed during her hospitalization:    Acute on chronic dyspnea  Patient presents with at least a month of progressive dyspnea to the point of limiting conversation without feeling SOB. Also reporting fatigue. She has been afebrile, and is saturating well on RA. No leukocytosis. VBG WNL. CRP negative. Pro-BNP mildly elevated. TSH WNL. CTPE 9/26 w/o evidence of PE and stable interstitial disease. BLE US 9/26 w/o evidence of DVT. Imaging and not suggestive of pulmonary edema or worsening heart failure at this time. BiPAP ordered for nighttime use but not tolerated on attempt 9/28-29. Patient also requested a palliative care consult to begin GOC discussions with her family.  -Pacemaker interrogation ordered, pending results  -Palliative care signed off  -Pulmonary following, appreciate recs     Chronic Medical Problems  RA -continue PTA imuran, trimethoprim  CKD - stable, at baseline. Monitor AM labs  Hypothyroid -continue levothyroxine 75 mg daily. TSH 1.13.  HTN -continue PTA carvedilol, lasix (40mg qAM, 20mg qPM), imdur, ASA  Atrial fibrillation -continue  PTA Eliquis 2.5 mg BID  Tachybrady syndrome s/p PPM -device interrogated without signs of malfunction  Hx MR s/p MV clip (2/2019) -monitor clinically, YOLANDA as above          Consultations This Hospital Stay   PULMONARY GENERAL ADULT IP CONSULT  PHYSICAL THERAPY ADULT IP CONSULT  PALLIATIVE CARE ADULT IP CONSULT    Code Status   DNR       The patient was discussed with MD Maci Su  Service  [facility], [place]  Pager: [phone]  ______________________________________________________________________    Physical Exam   Vital Signs: Temp: 98.6  F (37  C) Temp src: Oral BP: (!) 131/98 Pulse: 76 Heart Rate: 73 Resp: 14 SpO2: 98 % O2 Device: None (Room air)    Weight: 166 lbs 6.4 oz  General Appearance: Appears as stated age, in mild distress.  Respiratory: Speaking in full sentences on room air. Bibasilar crackles noted, unchanged since day of admission.  Cardiovascular: Normal rate, regular rhythm. No m/r/g.  GI: Soft, non tender, non distended.  Skin: Warm, dry. No worrisome skin lesions.  Other: No focal neurological deficits. No slurred speech.         Primary Care Physician   Tre Alvarado Vocal    Discharge Orders      Home care nursing referral      MD face to face encounter    Documentation of Face to Face and Certification for Home Health Services    I certify that patient: Linda Spicer is under my care and that I, or a nurse practitioner or physician's assistant working with me, had a face-to-face encounter that meets the physician face-to-face encounter requirements with this patient on: September 27, 2019.    This encounter with the patient was in whole, or in part, for the following medical condition, which is the primary reason for home health care: pertinent history including RA c/b IPF, HFpEF (LVEF 55-60% on 7/5/2019), HTN, CKD (baseline Cr 1.4-1.6), hypothyroid, and is admitted for progressive dyspnea and weakness.    I certify that, based on  my findings, the following services are medically necessary home health services: Nursing and Home Health Aide.    My clinical findings support the need for the above services because: Nurse is needed: To provide assessment and oversight required in the home to assure adherence to the medical plan due to: s/p acute hospitalization.    Further, I certify that my clinical findings support that this patient is homebound (i.e. absences from home require considerable and taxing effort and are for medical reasons or [employer] services or infrequently or of short duration when for other reasons) because: Requires assistance of another person or specialized equipment to access medical services because patient:  progressive dyspnea and weakness.    Based on the above findings. I certify that this patient is confined to the home and needs intermittent skilled nursing care, physical therapy and/or speech therapy.  The patient is under my care, and I have initiated the establishment of the plan of care.  This patient will be followed by a physician who will periodically review the plan of care.  Physician/Provider to provide follow up care: Tre Lockett    Attending hospital physician (the Medicare certified PEC provider): Pedro Luis Bansal*  Physician Signature: See electronic signature associated with these discharge orders.  Date: 9/27/2019       Significant Results and Procedures   Most Recent 3 CBC's:  Recent Labs   Lab Test 10/01/19  0606 09/30/19  0641 09/29/19  0718   WBC 5.4 5.0 6.4   HGB 12.2 12.8 12.7    100 101*    200 220     Most Recent 3 BMP's:  Recent Labs   Lab Test 10/01/19  0606 09/30/19  1752 09/30/19  0641 09/29/19  0718     --  139 140   POTASSIUM 4.1 3.9 3.5 3.7   CHLORIDE 104  --  103 102   CO2 24  --  28 27   BUN 53*  --  49* 50*   CR 1.62*  --  1.54* 1.43*   ANIONGAP 10  --  9 10   FATOUMATA 8.5  --  8.6 8.9   GLC 82  --  84 86     Most Recent 2 LFT's:  Recent Labs   Lab  Test 09/25/19  1020 06/20/19  0919   AST 26 44   ALT 29 43   ALKPHOS 64 67   BILITOTAL 0.5 0.4       Discharge Medications   Current Discharge Medication List      CONTINUE these medications which have NOT CHANGED    Details   !! apixaban ANTICOAGULANT (ELIQUIS ANTICOAGULANT) 2.5 MG tablet Take 1 tablet (2.5 mg) by mouth 2 times daily  Qty: 180 tablet, Refills: 3    Associated Diagnoses: Persistent atrial fibrillation      !! apixaban ANTICOAGULANT (ELIQUIS) 2.5 MG tablet Take 1 tablet (2.5 mg) by mouth 2 times daily  Qty: 60 tablet, Refills: 9    Associated Diagnoses: Atrial flutter, paroxysmal (H)      azaTHIOprine (IMURAN) 50 MG tablet Take 1 tablet (50 mg) by mouth daily  Qty: 90 tablet, Refills: 2    Associated Diagnoses: Rheumatoid arthritis involving multiple sites with positive rheumatoid factor (H)      carvedilol (COREG) 3.125 MG tablet Take 1 tablet (3.125 mg) by mouth 2 times daily (with meals)  Qty: 60 tablet, Refills: 0    Associated Diagnoses: Heart failure with preserved ejection fraction, NYHA class I (H); Benign essential hypertension      COMPOUNDED NON-CONTROLLED SUBSTANCE (CMPD RX) - PHARMACY TO MIX COMPOUNDED MEDICATION Estriol 1mg/gram. Place 1 gram vaginally daily for 2 weeks. Then vaginally twice weekly  Qty: 30 g, Refills: 6    Associated Diagnoses: Atrophic vaginitis      denosumab (PROLIA) 60 MG/ML SOLN injection Inject 1 mL (60 mg) Subcutaneous every 6 months  Qty: 1 mL    Comments: Reportedly last given on 11/26/18. Due for next dose on 5/26/18. Please discuss with endocrinology or primary care prior to administering this dose.  Associated Diagnoses: Age-related osteoporosis without current pathological fracture      furosemide (LASIX) 20 MG tablet Take 40 mg in the am and 20 mg in the afternoon.  Qty: 180 tablet, Refills: 3    Comments: Dose change  Associated Diagnoses: Acute on chronic diastolic heart failure (H)      hydrALAZINE (APRESOLINE) 10 MG tablet Take 1 tablet (10 mg) by  mouth 3 times daily  Qty: 90 tablet, Refills: 3    Associated Diagnoses: (HFpEF) heart failure with preserved ejection fraction (H); Mitral valve insufficiency, unspecified etiology      isosorbide mononitrate (IMDUR) 60 MG 24 hr tablet Take 1 tablet (60 mg) by mouth daily  Qty: 90 tablet, Refills: 1    Associated Diagnoses: Hypertension goal BP (blood pressure) < 150/90      levothyroxine (SYNTHROID/LEVOTHROID) 75 MCG tablet TAKE 1 TABLET BY MOUTH EVERY DAY  Qty: 90 tablet, Refills: 0    Associated Diagnoses: Hypothyroidism, unspecified type      nitroGLYcerin (NITROSTAT) 0.4 MG sublingual tablet Place 1 tablet (0.4 mg) under the tongue every 5 minutes as needed for chest pain  Qty: 25 tablet, Refills: 11    Associated Diagnoses: Acute chest pain      !! order for DME Dispense SP Walker-small  Qty: 1 each, Refills: 0    Associated Diagnoses: Pain of toe of right foot; Status post bunionectomy      !! order for DME Equipment being ordered: Dispense baffle, for use with nebulizer.  Qty: 1 each, Refills: 0    Associated Diagnoses: Pneumonia of left upper lobe due to Mycoplasma pneumoniae      !! order for DME Equipment being ordered: Nebulizer. Use with Albuterol.  Qty: 1 each, Refills: 0    Associated Diagnoses: Hypoxia      !! order for DME Equipment being ordered: Dispense face mask.  Mrs. Spicer is immunosuppressed due to rheumatoid arthritis.  Qty: 1 Box, Refills: 11    Associated Diagnoses: Rheumatoid arthritis involving left wrist with positive rheumatoid factor (H)      potassium chloride ER (K-DUR/KLOR-CON M) 20 MEQ CR tablet Take 2 tablets (40 mEq) by mouth daily  Qty: 180 tablet, Refills: 3    Associated Diagnoses: Acute on chronic diastolic heart failure (H)      senna-docusate (SENOKOT-S/PERICOLACE) 8.6-50 MG tablet Take 1 tablet by mouth daily as needed for constipation  Qty: 100 tablet, Refills: 3    Associated Diagnoses: Irritable bowel syndrome, unspecified type      trimethoprim (TRIMPEX) 100 MG  tablet Take 0.5 tablets (50 mg) by mouth daily  Qty: 45 tablet, Refills: 0    Associated Diagnoses: Recurrent UTI       !! - Potential duplicate medications found. Please discuss with provider.      STOP taking these medications       aspirin (ASA) 81 MG EC tablet Comments:   Reason for Stopping:         calcium carbonate-vitamin D (OS-FATOUMATA) 500-400 MG-UNIT tablet Comments:   Reason for Stopping:         Carboxymethylcellulose Sod PF (CELLUVISC/REFRESH LIQUIGEL) 1 % ophthalmic gel Comments:   Reason for Stopping:         cetirizine (ZYRTEC ALLERGY) 10 MG tablet Comments:   Reason for Stopping:         doxycycline monohydrate (MONODOX) 100 MG capsule Comments:   Reason for Stopping:         Multiple Vitamins-Minerals (PRESERVISION AREDS) CAPS Comments:   Reason for Stopping:         ranibizumab (LUCENTIS) 0.3 MG/0.05ML SOLN Comments:   Reason for Stopping:             Allergies   Allergies   Allergen Reactions     Cephalexin Diarrhea and GI Disturbance     Other reaction(s): GI Upset       Cephalexin Hcl Diarrhea     Gabapentin Other (See Comments)     Dizzsiness  Shaky, dizzy, dry mouth  Dizzsiness  Shaky,dry mouth       Methotrexate Other (See Comments)     Depleted Folic Acid  And caused severe leg cramps  Severe leg cramps     Naproxen GI Disturbance, Other (See Comments) and Nausea     Constipation. Tolerates ibuprofen.       Perfume      Sulfa Drugs Shortness Of Breath     Throat swelling     Alprazolam Other (See Comments)     Dizziness      Levofloxacin GI Disturbance     Macrobid [Nitrofurantoin Anhydrous]      Possibly related to lung disease      Nitrofurantoin      Possibly related to lung disease      Seasonal Allergies      Ciprofloxacin Itching and Rash

## 2019-09-28 NOTE — PROGRESS NOTES
Status: Pt admitted for acute on chronic COPD  Vitals: Temp 97.9, /69, respirations 30 breaths/min, 98% O2 on RA  Neuros: A/Ox4, generalized weakness  IV: PIV SL (right arm), patent with flush  Resp/trach: SOB on exertion or when speaking.  Sats high 90's on RA  Diet: 1g Na diet  Bowel status: BM this morning  : Voiding spontaneously   Skin: Fragile with bruising throughout   Pain: Denies  Activity: SBA/walker  Plan: Continue to monitor and assess.     Reported off to primary nurse for continuing care

## 2019-09-28 NOTE — PLAN OF CARE
Status: Patient admitted for acute on chronic COPD.   Vitals: VSS, HTN within parameters. O2 sats in high 90s, on RA.   Neuros: A&Ox4. Generalized weakness otherwise intact.   IV: PIV SL.  Resp/trach: SOB on exertion, pt reports it is improving.   Diet: 1G NA diet. Tolerating it well.   Bowel status: No BM this shift.   : Voiding spontaneously.  Skin: Bruised throughout. Fragile. Age related changes.   Pain: Denies.  Activity: Up SBA and walker.   Social: Son and daughter in law at bedside for part of the shift, very supportive.   Plan: Pulmonary following. Continue to monitor and follow POC.

## 2019-09-28 NOTE — PROGRESS NOTES
[facility], [place]    Progress Note - Maci 4 Service        Date of Admission:  9/25/2019    Assessment & Plan   Linda Spicer is a 88 year old female admitted on 9/25/2019. She has a pertinent history including RA c/b IPF, HFpEF (LVEF 55-60% on 7/5/2019), HTN, CKD (baseline Cr 1.4-1.6), hypothyroid, and is admitted for progressive dyspnea and weakness.    Acute on chronic dyspnea  Patient presents with at least a month of progressive dyspnea to the point of limiting conversation without feeling SOB. Also reporting fatigue. She has been afebrile, and is saturating well on RA. No leukocytosis. VBG WNL. CRP negative. Pro-BNP mildly elevated. TSH WNL. CTPE 9/26 w/o evidence of PE and stable interstitial disease. BLE US 9/26 w/o evidence of DVT. Imaging and not suggestive of pulmonary edema or worsening heart failure at this time. BiPAP ordered for nighttime use in order to assess whether symptoms are improved the next day. Patient also requested a palliative care consult to begin GOC discussions with her family.  -Palliative care consulted, appreciate recs  -RT consulted for BiPAP assessment, appreciate recs  -Pulmonary following, appreciate recs     Chronic Medical Problems  RA -continue PTA imuran, trimethoprim  CKD - stable, at baseline. Monitor AM labs  Hypothyroid -continue levothyroxine 75 mg daily. TSH 1.13.  HTN -continue PTA carvedilol, lasix (40mg qAM, 20mg qPM), imdur, ASA  Atrial fibrillation -continue PTA Eliquis 2.5 mg BID  Tachybrady syndrome s/p PPM -device interrogated without signs of malfunction  Hx MR s/p MV clip (2/2019) -monitor clinically, YOLANDA as above     Diet: 1 Gram Sodium Diet    Fluids: None, PO intake  Lines: pIV  DVT Prophylaxis: Therapeutic apixaban  Gallagher Catheter: not present  Code Status: DNR      Disposition Plan   Expected discharge: Tomorrow, recommended to prior living arrangement once SOB appropriately worked up.  Entered: Isaiah Hamilton,  MD 09/28/2019, 1:31 PM     The patient's care was discussed with the Attending Physician, Dr. Bansal, Bedside Nurse and Patient.    Isaiah Hamilton MD  [address], [place]  Pager: [phone]  Please see sticky note for cross cover information  _____________________________    Interval History   Nursing notes reviewed with no acute events overnight. Patient reports no change in her symptoms since yesterday. She reports that she was falling asleep while playing cards with her son and daughter in law last night. Able to ambulate, has an appetite, and makes normal amounts of urine. No fevers, chills, CP, palpitations, SOB, cough, abd pain, n/v/d.    4 point ROS is negative except for what is noted in the HPI.    Data reviewed today: I reviewed all medications, new labs and imaging results over the last 24 hours. I personally reviewed the chest CT image(s) showing no concern for PE and stable interstitial disease and the BLE US image(s) showing no evidence of DVT.    Physical Exam   Vital Signs: Temp: 97.9  F (36.6  C) Temp src: Oral BP: 107/69 Pulse: 65 Heart Rate: 73 Resp: 30 SpO2: 98 % O2 Device: None (Room air)    Weight: 166 lbs 6.4 oz  Constitutional: Alert, cooperative, still with mild anxiety and increased work of breathing  Eyes: Lids and lashes normal, pupils equal, sclera clear, conjunctiva normal  ENT: Normocephalic, without obvious abnormality, atraumatic, external ears without lesions  Respiratory: Coarse bibasilar crackles, L>R. Increased work of breathing improved from yesterday. No suprasternal or subcostal retractions or nasal flaring.  Cardiovascular: Normal apical impulse, regular rate and rhythm, normal S1 and S2, no S3 or S4, and no murmur noted  GI: No scars, normal bowel sounds, soft, non-distended, non-tender, no masses palpated, no hepatosplenomegaly  Skin: no bruising or bleeding, normal skin color, texture, turgor, no redness, warmth, or swelling, no  rashes and no lesions  Musculoskeletal: Swelling of finger joints bilaterally with mild tenderness. Ulnar deviation noted.  Neuropsychiatric: General: normal, calm and normal eye contact  Level of consciousness: alert / normal  Affect: anxious  Memory and insight: normal, memory for past and recent events intact and thought process normal    Data   Recent Labs   Lab 09/28/19  0713 09/27/19  0920 09/27/19  0706 09/26/19  1555 09/26/19  0334 09/25/19  1020   WBC 5.4 5.2  --   --  5.0 5.2   HGB 12.6 12.8  --   --  12.0 12.5   * 101*  --   --  101* 103*    212  --   --  216 235   INR  --   --   --   --   --  1.26*     --  138 139 140 140   POTASSIUM 3.8  --  4.1 4.1 4.0 4.0   CHLORIDE 103  --  103 104 106 106   CO2 27  --  27 26 21 30   BUN 45*  --  36* 33* 34* 36*   CR 1.31*  --  1.42* 1.26* 1.20* 1.48*   ANIONGAP 10  --  8 8 12 4   FATOUMATA 8.8  --  9.2 9.0 8.6 8.9   GLC 83  --  85 106* 77 65*   ALBUMIN  --   --   --   --   --  3.5   PROTTOTAL  --   --   --   --   --  7.2   BILITOTAL  --   --   --   --   --  0.5   ALKPHOS  --   --   --   --   --  64   ALT  --   --   --   --   --  29   AST  --   --   --   --   --  26   TROPI  --   --   --   --   --  <0.015     No results found for this or any previous visit (from the past 24 hour(s)).

## 2019-09-28 NOTE — PLAN OF CARE
CASANDRASARITA, 02 sats 90s room air as they have been, also still has shortness of breath and related weakness with activity. Tolerating regular diet and activity up to bathroom slowly. Seen by team, they explained trying BIPAP tonight to see if breathing better. PT recommends TCU stay,  to see. Son and his wife herevisiting, aware of plan.

## 2019-09-29 ENCOUNTER — APPOINTMENT (OUTPATIENT)
Dept: PHYSICAL THERAPY | Facility: CLINIC | Age: 84
DRG: 204 | End: 2019-09-29
Attending: STUDENT IN AN ORGANIZED HEALTH CARE EDUCATION/TRAINING PROGRAM
Payer: MEDICARE

## 2019-09-29 LAB
ANION GAP SERPL CALCULATED.3IONS-SCNC: 10 MMOL/L (ref 3–14)
BUN SERPL-MCNC: 50 MG/DL (ref 7–30)
CALCIUM SERPL-MCNC: 8.9 MG/DL (ref 8.5–10.1)
CHLORIDE SERPL-SCNC: 102 MMOL/L (ref 94–109)
CO2 SERPL-SCNC: 27 MMOL/L (ref 20–32)
CREAT SERPL-MCNC: 1.43 MG/DL (ref 0.52–1.04)
ERYTHROCYTE [DISTWIDTH] IN BLOOD BY AUTOMATED COUNT: 14.5 % (ref 10–15)
GFR SERPL CREATININE-BSD FRML MDRD: 33 ML/MIN/{1.73_M2}
GLUCOSE SERPL-MCNC: 86 MG/DL (ref 70–99)
HCT VFR BLD AUTO: 39.1 % (ref 35–47)
HGB BLD-MCNC: 12.7 G/DL (ref 11.7–15.7)
MCH RBC QN AUTO: 32.6 PG (ref 26.5–33)
MCHC RBC AUTO-ENTMCNC: 32.5 G/DL (ref 31.5–36.5)
MCV RBC AUTO: 101 FL (ref 78–100)
PLATELET # BLD AUTO: 220 10E9/L (ref 150–450)
POTASSIUM SERPL-SCNC: 3.7 MMOL/L (ref 3.4–5.3)
RBC # BLD AUTO: 3.89 10E12/L (ref 3.8–5.2)
SODIUM SERPL-SCNC: 140 MMOL/L (ref 133–144)
WBC # BLD AUTO: 6.4 10E9/L (ref 4–11)

## 2019-09-29 PROCEDURE — 97530 THERAPEUTIC ACTIVITIES: CPT | Mod: GP | Performed by: REHABILITATION PRACTITIONER

## 2019-09-29 PROCEDURE — 80048 BASIC METABOLIC PNL TOTAL CA: CPT | Performed by: STUDENT IN AN ORGANIZED HEALTH CARE EDUCATION/TRAINING PROGRAM

## 2019-09-29 PROCEDURE — 99232 SBSQ HOSP IP/OBS MODERATE 35: CPT | Performed by: STUDENT IN AN ORGANIZED HEALTH CARE EDUCATION/TRAINING PROGRAM

## 2019-09-29 PROCEDURE — 25000132 ZZH RX MED GY IP 250 OP 250 PS 637: Mod: GY | Performed by: STUDENT IN AN ORGANIZED HEALTH CARE EDUCATION/TRAINING PROGRAM

## 2019-09-29 PROCEDURE — 99223 1ST HOSP IP/OBS HIGH 75: CPT | Performed by: INTERNAL MEDICINE

## 2019-09-29 PROCEDURE — 97116 GAIT TRAINING THERAPY: CPT | Mod: GP | Performed by: REHABILITATION PRACTITIONER

## 2019-09-29 PROCEDURE — 36415 COLL VENOUS BLD VENIPUNCTURE: CPT | Performed by: STUDENT IN AN ORGANIZED HEALTH CARE EDUCATION/TRAINING PROGRAM

## 2019-09-29 PROCEDURE — 85027 COMPLETE CBC AUTOMATED: CPT | Performed by: STUDENT IN AN ORGANIZED HEALTH CARE EDUCATION/TRAINING PROGRAM

## 2019-09-29 PROCEDURE — 25000131 ZZH RX MED GY IP 250 OP 636 PS 637: Mod: GY | Performed by: STUDENT IN AN ORGANIZED HEALTH CARE EDUCATION/TRAINING PROGRAM

## 2019-09-29 PROCEDURE — 12000001 ZZH R&B MED SURG/OB UMMC

## 2019-09-29 RX ADMIN — Medication 50 MG: at 19:01

## 2019-09-29 RX ADMIN — APIXABAN 2.5 MG: 2.5 TABLET, FILM COATED ORAL at 07:40

## 2019-09-29 RX ADMIN — CETIRIZINE HYDROCHLORIDE 10 MG: 10 TABLET, FILM COATED ORAL at 20:42

## 2019-09-29 RX ADMIN — APIXABAN 2.5 MG: 2.5 TABLET, FILM COATED ORAL at 19:00

## 2019-09-29 RX ADMIN — HYDRALAZINE HYDROCHLORIDE 10 MG: 10 TABLET, FILM COATED ORAL at 13:48

## 2019-09-29 RX ADMIN — HYDRALAZINE HYDROCHLORIDE 10 MG: 10 TABLET, FILM COATED ORAL at 07:40

## 2019-09-29 RX ADMIN — AZATHIOPRINE 50 MG: 50 TABLET ORAL at 07:39

## 2019-09-29 RX ADMIN — Medication 1 CAPSULE: at 07:39

## 2019-09-29 RX ADMIN — HYDRALAZINE HYDROCHLORIDE 10 MG: 10 TABLET, FILM COATED ORAL at 20:42

## 2019-09-29 RX ADMIN — FUROSEMIDE 40 MG: 20 TABLET ORAL at 15:56

## 2019-09-29 RX ADMIN — TOBRAMYCIN AND DEXAMETHASONE 1 DROP: 3; 1 SUSPENSION/ DROPS OPHTHALMIC at 07:41

## 2019-09-29 RX ADMIN — FUROSEMIDE 40 MG: 20 TABLET ORAL at 07:40

## 2019-09-29 RX ADMIN — LEVOTHYROXINE SODIUM 75 MCG: 75 TABLET ORAL at 07:39

## 2019-09-29 RX ADMIN — SENNOSIDES AND DOCUSATE SODIUM 1 TABLET: 8.6; 5 TABLET ORAL at 23:58

## 2019-09-29 RX ADMIN — CARVEDILOL 3.12 MG: 3.12 TABLET, FILM COATED ORAL at 07:40

## 2019-09-29 RX ADMIN — Medication 1 TABLET: at 07:40

## 2019-09-29 RX ADMIN — CARVEDILOL 3.12 MG: 3.12 TABLET, FILM COATED ORAL at 19:00

## 2019-09-29 RX ADMIN — ISOSORBIDE MONONITRATE 60 MG: 30 TABLET, EXTENDED RELEASE ORAL at 07:39

## 2019-09-29 RX ADMIN — ASPIRIN 81 MG: 81 TABLET, COATED ORAL at 07:40

## 2019-09-29 RX ADMIN — TOBRAMYCIN AND DEXAMETHASONE 1 DROP: 3; 1 SUSPENSION/ DROPS OPHTHALMIC at 20:45

## 2019-09-29 RX ADMIN — TOBRAMYCIN AND DEXAMETHASONE 1 DROP: 3; 1 SUSPENSION/ DROPS OPHTHALMIC at 13:07

## 2019-09-29 RX ADMIN — Medication 1 CAPSULE: at 19:00

## 2019-09-29 ASSESSMENT — ACTIVITIES OF DAILY LIVING (ADL)
ORAL_HYGIENE: WITH ASSISTANCE
ADLS_ACUITY_SCORE: 17
HYGIENE/GROOMING: WITH ASSISTANCE
ADLS_ACUITY_SCORE: 17

## 2019-09-29 NOTE — CONSULTS
[facility]  Palliative Care Consultation Note    Patient: Linda Spicer  Date of Admission:  9/25/2019    Requesting Clinician / Team: [facility] medicine team  Reason for consult: Symptom management  Patient and family support    Recommendations:      Continue workup as per primary team to determine cause of episodes of significant dyspnea followed by feeling of severe weakness.  (Linda describes them as a sensation that starts at the top of her head and seems to prevent her from being able to breathe followed by a.  Of extreme weakness).  I do not believe these episodes are related to anxiety.     Trial of fan at bedside pointed at face when feeling SOB    Consider nutrition referral - Linda's diet is severely restricted as she is very strict with her low sodium diet and will not have more than 1000mg of sodium a day.     Palliative care  to see patient Monday or Tuesday to work on non pharm management of dyspnea    POLST - Linda has an advanced directive completed and is DNR. POLST was reviewed with her but she wanted her son, Arjun, to read it over before signing. Arjun will be in hospital on Monday. POLST left at bedside but needs to be signed when medical provider is present.      These recommendations have been discussed with Dr. Bansal.      Thank you for the opportunity to participate in the care of this patient and family. Our team: will continue to follow.     During regular M-F work hours--if you are not sure who specifically to contact--please contact us by sending a text page to our team consult pager at [phone].    After regular work hours and on weekends/holidays, you can call our answering service at [phone]. Also, who's on call for us is available in [facility] Smart Web.       Assessments:  Linda Spicer is a 88 year old female with PMH sig for rheumatoid arthritis, interstitial lung disease (UIP radiology pattern), HFpEF, HTN, and CKD who  presented on 2019 with fairly sudden onset of shortness of breath and  weakness.     Today, the patient was seen for:  Interstitial lung disease  HFpEF  CKD  Sudden onset shortness of breath and weakness Linda    I visited with Linda today and introduced palliative care and ways in which we can be helpful including symptom management, decisional support (goals of care), and additional support.     Linda tells me that she moved into [facility] in 3/2015 with her .  Since living there she has developed many friendships and is very engaged in the community there, playing [employer] and [employer] regularly.  Her  was diagnosed with prostate cancer was very sick for a while before he  on 2018.  Linda broke her tailbone on 2018 which was right before her  was scheduled to have surgery and that certainly made things difficult for both of them.  He  2 months later.  Kaela has 6 children and 10 grandchildren and 19 great grandkids and feels incredibly well supported by everyone.  She does her own cooking and laundry and cleans up and has somebody do heavier cleaning around her apartment.  Her son Arjun and daughter Xi probably help her the most but everyone is willing to help out whenever they can.  Her youngest son had an alcohol problem and ended up either falling or getting hit and now has a seizure disorder and needs a lot of help caring for himself.  Her daughter Xi helps take care of him.  She has a daughter who lives in [place] who also has many illnesses after being hit by a semi-.  Her oldest daughter lives in [place] and also has many different illnesses and while she and her  wanted Linda to move in with them, or if he feels that they have enough going on that they do not need to take care of her as well.  She was  for 68 years to her .  She is [employer] and goes to [employer] and is very involved with the [employer] and talks frequently to the sister at  [facility].  Linda had her mitral valve clip in 2019 and since that time has been feeling very well.  More recently with the kidney infection at the beginning of September she has been feeling weaker and believes that she is likely dying.  She understands that nothing has been found since admission that could be causing the episodes of shortness of breath and weakness.  She wonders if maybe what she needs his meals prepared for her so that she can get stronger.  She is very restricted in her diet because she is not allowed to have more than 1000 mg of sodium a day.    Talked about the anniversary of her 's death and she says that while it was a very emotional time she has gone to different grief support groups and there is also some relief when he  because there was a lot of work to care for him.    Prognosis, Goals, & Planning:      Functional Status just prior to hospitalization: 1 (Restricted in physically strenuous activity but ambulatory and able to carry out work of a light or sedentary nature)      Prognosis, Goals, and/or Advance Care Planning were addressed today: Yes      Patient's decision making preferences: with input from medical clinicians and loved ones      Patient has decision-making capacity today for complex decisions: Yes      I have concerns about the patient/family's health literacy today: No      Patient has a completed Health Care Directive: Yes, and on file.      Code status: DNR    Coping, Meaning, & Spirituality:   Mood, coping, and/or meaning in the context of serious illness were addressed today: Yes    Social:     Living situation: Linda lives in [facility] assisted living [facility].  She and her  moved in to this facility in 2015.  Her  was diagnosed with prostate cancer and  2018.  They have been  for 68 years Linda loves her living situation and has a huge number of friends at the facility.  She feels all of her  friends care very much about her.    Key family / caregivers: Linda has 6 children, 10 grandchildren, and 18 great grandkids.  She feels extremely well supported by her entire family.  She was very sad and emotional at the year anniversary of her 's death but she also felt some relief when he  because it was a lot of work to care for him.    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    Current in-home services: Lives in an assisted living facility and feels the staff takes good care of her    History of Present Illness:  History gathered today from: patient, medical chart, medical team members    Linda Spicer is a 88 year old female with PMH sig for rheumatoid arthritis, interstitial lung disease (UIP radiology pattern), HFpEF, HTN, and CKD who presented on 2019 with fairly sudden onset of shortness of breath and fatigue. Patient reports that she started to not feel well approximately 3 weeks ago when she was diagnosed with a UTI. Although her UTI was treated at that time with an antibiotic, she states that since then she has not returned to her baseline. On the day of admission, she was sitting with friends playing cards when she became very weak and short of breath and although she was able to sit in her chair and rest for a little and then walk back to her apartment, her children decided to call 911. At baseline, she is active and able to complete basic daily tasks independently.     Key Palliative Symptom Data:  # Pain severity the last 12 hours: none  # Dyspnea severity the last 12 hours: none  # Nausea severity the last 12 hours: none  # Anxiety severity the last 12 hours: none    Patient is on opioids: bowels not assessed today.    ROS:  Comprehensive ROS is reviewed and is negative except as here & per HPI:      Past Medical History:  Past Medical History:   Diagnosis Date      Adjustment disorder with anxious mood 5/18/2015     Advanced directives, counseling/discussion 8/30/2012    Patient states has Advance Directive and will bring in a copy to clinic. 8/30/2012   Tevin May  [facility] Medical Assistant \       Anemia 9/25/2015     Basal cell cancer      CHF (congestive heart failure) (H) 9/18/2014     CKD (chronic kidney disease) stage 3, GFR 30-59 ml/min (H) 9/29/2015     DDD (degenerative disc disease), lumbar 9/25/2015     Diffuse idiopathic pulmonary fibrosis (H) 5/6/2013     Encounter for palliative care 5/18/2015     History of blood transfusion 9/29/2015     Hypertension goal BP (blood pressure) < 140/90 9/7/2012     Hypothyroid 9/7/2012     Irritable bowel syndrome 10/29/2013     Macular degeneration      Macular degeneration, 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     Nondisplaced spiral fracture of shaft of humerus      Osteoporosis 8/13/2013     Imo Update utility     RA (rheumatoid arthritis) (H) 5/7/2013     Rheumatic fever      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 2/20/2019     Shingles      Spinal stenosis of lumbar region with neurogenic claudication 9/14/2015        Past Surgical History:  Past Surgical History:   Procedure Laterality Date     ANESTHESIA CARDIOVERSION N/A 9/14/2018    Procedure: ANESTHESIA CARDIOVERSION;;  Surgeon: GENERIC ANESTHESIA PROVIDER;  Location: [facility] OR     ANESTHESIA CARDIOVERSION N/A 10/11/2018    Procedure: ANESTHESIA CARDIOVERSION;  Cardioversion;  Surgeon: GENERIC ANESTHESIA PROVIDER;  Location: [facility] OR     ANESTHESIA CARDIOVERSION N/A 10/16/2018    Procedure: Anesthesia Cardioversion ;  Surgeon: GENERIC ANESTHESIA PROVIDER;  Location: [facility] OR     APPENDECTOMY       BIOPSY      hemorrhoidectomy     CV HEART CATHETERIZATION WITH POSSIBLE INTERVENTION N/A 1/31/2019    Procedure: CORS,LHC,RHC-YOLANDA BEFORE CATH APPOINTMENT;  Surgeon: Avila Watson MD;  Location:  HEART CARDIAC CATH LAB     CV MITRACLIP N/A 2/11/2019    Procedure: Mitraclip Procedure;   Surgeon: Avila Watson MD;  Location: [facility] OR     ENT SURGERY      tonsillectomy     GYN SURGERY      3 D & C's     HYSTERECTOMY, PAP NO LONGER INDICATED       LAMINECTOMY LUMBAR ONE LEVEL N/A 10/13/2015    Procedure: LAMINECTOMY LUMBAR ONE LEVEL;  Surgeon: Fransico Toussaint MD;  Location: [facility] OR         Family History:  Family History   Problem Relation Age of Onset     Hypertension Mother      Psychotic Disorder Father      Diabetes Son      Diabetes Daughter      Blood Disease Daughter         Allergies:  Allergies   Allergen Reactions     Cephalexin Diarrhea and GI Disturbance     Other reaction(s): GI Upset       Cephalexin Hcl Diarrhea     Gabapentin Other (See Comments)     Dizzsiness  Shaky, dizzy, dry mouth  Dizzsiness  Shaky,dry mouth       Methotrexate Other (See Comments)     Depleted Folic Acid  And caused severe leg cramps  Severe leg cramps     Naproxen GI Disturbance, Other (See Comments) and Nausea     Constipation. Tolerates ibuprofen.       Perfume      Sulfa Drugs Shortness Of Breath     Throat swelling     Alprazolam Other (See Comments)     Dizziness      Levofloxacin GI Disturbance     Macrobid [Nitrofurantoin Anhydrous]      Possibly related to lung disease      Nitrofurantoin      Possibly related to lung disease      Seasonal Allergies      Ciprofloxacin Itching and Rash        Medications:  I have reviewed this patient's medication profile and medications from this hospitalization.       Physical Exam:  Vital Signs: Temp: 98.3  F (36.8  C) Temp src: Oral BP: 123/66 Pulse: 71 Heart Rate: 81 Resp: 18 SpO2: 98 % O2 Device: None (Room air)    Weight: 166 lbs 6.4 oz  General: alert, sitting in bedside chair, appears younger than stated age, in NAD  Eyes: EOMI, sclera clear  HEENT: NC/AT; mucous membranes moist  Lungs: speaking full sentences and occasionally needing to catch her breath while talking, CTA b/l  Heart: RRR  Abdomen: +BS, soft, non tender to palpation, no rebound or  guarding  Ext: no lower ext edema  Neuro: A&O x 3; CN II-XII grossly intact;   Neuropsych: alert, engaged, good eye contact, pleasant, sensorium intact      Data reviewed:  Recent imaging and labs reviewed.    Yojana Pal MD / Palliative Medicine / Pager [phone] / [facility] Inpatient Team Consult Pager [phone] (answered 8am-430pm M-F) - ok to text page via [employer] / After-Hours Answering Service [phone] / Palliative Clinic in the [facility] the [facility] [place] - [phone] (scheduling); [phone] (triage).    Total time spent was 80 minutes,  >50% of time was spent counseling and/or coordination of care regarding episodes of dyspnea, heart failure, interstitial lung disease, advanced care planning, discharge planning.

## 2019-09-29 NOTE — PROGRESS NOTES
Status: Pt admitted for acute on chronic CHF  Vitals: Temp 98.3, /66, respirations 18 breaths/min, pulse ox 98% on RA  Neuros: A/Ox4, generalized weakness  IV: PIV SL (right arm), patent with flush  Resp/trach: SOB on exertion or when speaking.  Sats high 90's on RA  Diet: 1g Na diet  Bowel status: BM this morning  : Voiding spontaneously   Skin: Fragile with bruising throughout   Pain: Denies  Activity: SBA/walker  Plan: Continue to monitor and assess. Would like to take a walk down the hallway later in the afternoon when her energy level increases.     Reported off to primary nurse for continuing care

## 2019-09-29 NOTE — PROGRESS NOTES
RT progress/consult note:    After extended discussion with pt regarding breathing issues, it did not seem that bipap at night would be the answer.  Pt states that she's been sleeping well without issues at night.      Attempted Bipap anyways, 8/5, 21%.  Pt did not tolerate at all.  Could not handle the pressure, dry mouth, etc.      Bipap removed from room.    Juan Alberto Will, RRT  9/28/2019

## 2019-09-29 NOTE — PROGRESS NOTES
[facility], [place]    Progress Note - [facility] Service        Date of Admission:  9/25/2019    Assessment & Plan   Linda Spicer is a 88 year old female admitted on 9/25/2019. She has a pertinent history including RA c/b IPF, HFpEF (LVEF 55-60% on 7/5/2019), HTN, CKD (baseline Cr 1.4-1.6), hypothyroid, and is admitted for progressive dyspnea and weakness.     Acute on chronic dyspnea  Patient presents with at least a month of progressive dyspnea to the point of limiting conversation without feeling SOB. Also reporting fatigue. She has been afebrile, and is saturating well on RA. No leukocytosis. VBG WNL. CRP negative. Pro-BNP mildly elevated. TSH WNL. CTPE 9/26 w/o evidence of PE and stable interstitial disease. BLE US 9/26 w/o evidence of DVT. Imaging and not suggestive of pulmonary edema or worsening heart failure at this time. BiPAP ordered for nighttime use but not tolerated on attempt 9/28-29. Patient also requested a palliative care consult to begin GOC discussions with her family.  -Palliative care consulted, appreciate recs  -Pulmonary following, appreciate recs     Chronic Medical Problems  RA -continue PTA imuran, trimethoprim  CKD - stable, at baseline. Monitor AM labs  Hypothyroid -continue levothyroxine 75 mg daily. TSH 1.13.  HTN -continue PTA carvedilol, lasix (40mg qAM, 20mg qPM), imdur, ASA  Atrial fibrillation -continue PTA Eliquis 2.5 mg BID  Tachybrady syndrome s/p PPM -device interrogated without signs of malfunction  Hx MR s/p MV clip (2/2019) -monitor clinically, YOLANDA as above       Diet: 1 Gram Sodium Diet    Fluids: None  Lines: PIV  DVT Prophylaxis: Apixiban  Gallagher Catheter: not present  Code Status: DNR      Disposition Plan   Expected discharge: 2 - 3 days, recommended to transitional care unit once safe disposition plan/ TCU bed available.  Entered: Pedro Luis Bansal MD 09/29/2019, 11:38 AM       The patient's care was discussed with the Bedside  Nurse and Patient.    Pedro Luis Bansal MD  [place] [id number] Service  [facility], [place]  Pager: [phone]  Please see sticky note for cross cover information  ______________________________________________________________________    Interval History   Felt better this morning, slept well. When up in bathroom developed subjective dyspnea again.    Data reviewed today: I reviewed all medications, new labs and imaging results over the last 24 hours. I personally reviewed no images or EKG's today.    Physical Exam   Vital Signs: Temp: 98.7  F (37.1  C) Temp src: Oral BP: 122/59 Pulse: 71 Heart Rate: 99 Resp: 24 SpO2: 96 % O2 Device: None (Room air)    Weight: 166 lbs 6.4 oz  General Appearance: NAD, pleasant, conversant, sitting up in bed.  Respiratory: Increased WOB, bilateral inspiratory crackles most prominent at bases.  Cardiovascular: RRR, no MRG, normal S1 and S2  GI: Soft, nondistended.    Data   Recent Labs   Lab 09/29/19  0718 09/28/19  0713 09/27/19  0920 09/27/19  0706  09/25/19  1020   WBC 6.4 5.4 5.2  --    < > 5.2   HGB 12.7 12.6 12.8  --    < > 12.5   * 101* 101*  --    < > 103*    217 212  --    < > 235   INR  --   --   --   --   --  1.26*    140  --  138   < > 140   POTASSIUM 3.7 3.8  --  4.1   < > 4.0   CHLORIDE 102 103  --  103   < > 106   CO2 27 27  --  27   < > 30   BUN 50* 45*  --  36*   < > 36*   CR 1.43* 1.31*  --  1.42*   < > 1.48*   ANIONGAP 10 10  --  8   < > 4   FATOUMATA 8.9 8.8  --  9.2   < > 8.9   GLC 86 83  --  85   < > 65*   ALBUMIN  --   --   --   --   --  3.5   PROTTOTAL  --   --   --   --   --  7.2   BILITOTAL  --   --   --   --   --  0.5   ALKPHOS  --   --   --   --   --  64   ALT  --   --   --   --   --  29   AST  --   --   --   --   --  26   TROPI  --   --   --   --   --  <0.015    < > = values in this interval not displayed.     No results found for this or any previous visit (from the past 24 hour(s)).  Medications     - MEDICATION INSTRUCTIONS  -       - MEDICATION INSTRUCTIONS -       - MEDICATION INSTRUCTIONS -         apixaban ANTICOAGULANT  2.5 mg Oral BID     aspirin  81 mg Oral Daily     azaTHIOprine  50 mg Oral Daily     calcium carbonate 600 mg-vitamin D 400 units  1 tablet Oral Daily     carvedilol  3.125 mg Oral BID w/meals     cetirizine  10 mg Oral At Bedtime     furosemide  40 mg Oral BID     hydrALAZINE  10 mg Oral TID     isosorbide mononitrate  60 mg Oral Daily     levothyroxine  75 mcg Oral Daily     multivitamin  with lutein  1 capsule Oral BID     sodium chloride (PF)  3 mL Intracatheter Q8H     tobramycin-dexamethasone  1 drop Both Eyes Q4H While awake     trimethoprim  50 mg Oral Daily

## 2019-09-29 NOTE — PROGRESS NOTES
"Infusion Nursing Note:  Linda M Esteban presents today for Orencia.    Patient seen by provider today: No   present during visit today: Not Applicable.    Note: N/A.    Intravenous Access:  Peripheral IV placed.    Treatment Conditions:  Rheumatology Infusion Checklist: PRIOR TO INFUSION OF BIOLOGICAL MEDICATIONS OR ANY OF THESE AS LISTED: Orencia (abatacept) \".rheumbiologicalchecklist\"    Prior to Infusion of biological medications or any of these as listed:    1. Elevated temperature, fever, chills, productive cough or abnormal vital signs, night sweats, coughing up blood or sputum, no appetite or abnormal vital signs : NO    2. Open wounds or new incisions: NO    3. Recent hospitalization: NO    4.  Recent surgeries:  NO    5. Any upcoming surgeries or dental procedures?:NO    6. Any current or recent bouts of illness or infection? On any antibiotics? : NO    7. Any new, sudden or worsening abdominal pain :NO    8. Vaccination within 4 weeks? Patient or someone in the household is scheduled to receive vaccination? No live virus vaccines prior to or during treatment :NO    9. Any nervous system diseases [i.e. multiple sclerosis, Guillain-[place], seizures, neurological  changes]: NO    10. Pregnant or breast feeding; or plans on pregnancy in the future: NO    11. Signs of worsening depression or suicidal ideations while taking benlysta:NO    12. New-onset medical symptoms: NO    13.  New cancer diagnosis or on chemotherapy or radiation NO    14.  Evaluate for any sign of active TB [Unexplained weight loss, Loss of appetite, Night sweats, Fever, Fatigue, Chills, Coughing for 3 weeks or longer, Hemoptysis (coughing up blood), Chest pain]: NO    **Note: If answered yes to any of the above, hold the infusion and contact ordering rheumatologist or on-call rheumatologist.     Post Infusion Assessment:  Patient tolerated infusion without incident.  Blood return noted pre and post infusion.  Site patent and " intact, free from redness, edema or discomfort.  No evidence of extravasations.  Access discontinued per protocol.  Rheumatology Post Infusion: POST-INFUSION OF BIOLOGICAL MEDICATION:    Reviewed with patient.  Given biologic medication or medication hand-out. Inform patient if any fever, chills or signs of infection, new symptoms, abdominal pain, heart palpitations, shortness of breath, reaction, weakness, neurological changes, seek medical attention immediately and should not receive infusions. No live virus vaccines prior to or during treatment or up to 6 months post infusion. If the patient has an upcoming procedure or surgery, this should be discussed with the rheumatologist and surgeon or provider.    Discharge Plan:   Patient will return 9/13/18 for next appointment.   Patient discharged in stable condition accompanied by: daughter.  Departure Mode: Ambulatory.    Trinity Villalpando RN                         Alert/Cooperative/Awake

## 2019-09-29 NOTE — PROGRESS NOTES
"The Pt was admitted for weakness dyspnea on exertion. Remains alert and oriented x4, can make needs known. Voiding spontaneously, no BM. Requested for Senna and same administered. Bed alarm applied and sleeping between cares. ./73 (BP Location: Left arm)   Pulse 71   Temp 98.4  F (36.9  C) (Oral)   Resp 16   Ht 1.626 m (5' 4\")   Wt 75.5 kg (166 lb 6.4 oz)   LMP  (LMP Unknown)   SpO2 96%   BMI 28.56 kg/m    No significant changes.  "

## 2019-09-29 NOTE — PLAN OF CARE
Discharge Planner PT   Patient plan for discharge: Did not discuss this session  Current status: Pt amb ~ 40 feet x 2 with 4WW and CGA, prolonged seated rest break between bouts. Pt tx sit->stand with CGA. Pt performed toilet transfer with SBA.   Barriers to return to prior living situation: medical status, SOB, fatigue  Recommendations for discharge: TCU  Rationale for recommendations: Pt would benefit from additional skilled PT to address functional mobility, transfers, gait, balance and endurance.        Entered by: Minna Nichols 09/29/2019 12:49 PM

## 2019-09-30 ENCOUNTER — APPOINTMENT (OUTPATIENT)
Dept: PHYSICAL THERAPY | Facility: CLINIC | Age: 84
DRG: 204 | End: 2019-09-30
Attending: STUDENT IN AN ORGANIZED HEALTH CARE EDUCATION/TRAINING PROGRAM
Payer: MEDICARE

## 2019-09-30 LAB
ANION GAP SERPL CALCULATED.3IONS-SCNC: 9 MMOL/L (ref 3–14)
BUN SERPL-MCNC: 49 MG/DL (ref 7–30)
CALCIUM SERPL-MCNC: 8.6 MG/DL (ref 8.5–10.1)
CHLORIDE SERPL-SCNC: 103 MMOL/L (ref 94–109)
CO2 SERPL-SCNC: 28 MMOL/L (ref 20–32)
CREAT SERPL-MCNC: 1.54 MG/DL (ref 0.52–1.04)
ERYTHROCYTE [DISTWIDTH] IN BLOOD BY AUTOMATED COUNT: 14.3 % (ref 10–15)
GFR SERPL CREATININE-BSD FRML MDRD: 30 ML/MIN/{1.73_M2}
GLUCOSE SERPL-MCNC: 84 MG/DL (ref 70–99)
HCT VFR BLD AUTO: 38.5 % (ref 35–47)
HGB BLD-MCNC: 12.8 G/DL (ref 11.7–15.7)
MAGNESIUM SERPL-MCNC: 2.2 MG/DL (ref 1.6–2.3)
MCH RBC QN AUTO: 33.2 PG (ref 26.5–33)
MCHC RBC AUTO-ENTMCNC: 33.2 G/DL (ref 31.5–36.5)
MCV RBC AUTO: 100 FL (ref 78–100)
PLATELET # BLD AUTO: 200 10E9/L (ref 150–450)
POTASSIUM SERPL-SCNC: 3.5 MMOL/L (ref 3.4–5.3)
POTASSIUM SERPL-SCNC: 3.9 MMOL/L (ref 3.4–5.3)
RBC # BLD AUTO: 3.85 10E12/L (ref 3.8–5.2)
SODIUM SERPL-SCNC: 139 MMOL/L (ref 133–144)
WBC # BLD AUTO: 5 10E9/L (ref 4–11)

## 2019-09-30 PROCEDURE — 99232 SBSQ HOSP IP/OBS MODERATE 35: CPT | Mod: GC | Performed by: STUDENT IN AN ORGANIZED HEALTH CARE EDUCATION/TRAINING PROGRAM

## 2019-09-30 PROCEDURE — 97530 THERAPEUTIC ACTIVITIES: CPT | Mod: GP

## 2019-09-30 PROCEDURE — 85027 COMPLETE CBC AUTOMATED: CPT | Performed by: STUDENT IN AN ORGANIZED HEALTH CARE EDUCATION/TRAINING PROGRAM

## 2019-09-30 PROCEDURE — 83735 ASSAY OF MAGNESIUM: CPT | Performed by: STUDENT IN AN ORGANIZED HEALTH CARE EDUCATION/TRAINING PROGRAM

## 2019-09-30 PROCEDURE — 12000001 ZZH R&B MED SURG/OB UMMC

## 2019-09-30 PROCEDURE — 84132 ASSAY OF SERUM POTASSIUM: CPT | Performed by: STUDENT IN AN ORGANIZED HEALTH CARE EDUCATION/TRAINING PROGRAM

## 2019-09-30 PROCEDURE — 25000132 ZZH RX MED GY IP 250 OP 250 PS 637: Mod: GY | Performed by: STUDENT IN AN ORGANIZED HEALTH CARE EDUCATION/TRAINING PROGRAM

## 2019-09-30 PROCEDURE — 97116 GAIT TRAINING THERAPY: CPT | Mod: GP

## 2019-09-30 PROCEDURE — 80048 BASIC METABOLIC PNL TOTAL CA: CPT | Performed by: STUDENT IN AN ORGANIZED HEALTH CARE EDUCATION/TRAINING PROGRAM

## 2019-09-30 PROCEDURE — 36415 COLL VENOUS BLD VENIPUNCTURE: CPT | Performed by: STUDENT IN AN ORGANIZED HEALTH CARE EDUCATION/TRAINING PROGRAM

## 2019-09-30 PROCEDURE — 25000131 ZZH RX MED GY IP 250 OP 636 PS 637: Mod: GY | Performed by: STUDENT IN AN ORGANIZED HEALTH CARE EDUCATION/TRAINING PROGRAM

## 2019-09-30 RX ORDER — POTASSIUM CHLORIDE 29.8 MG/ML
20 INJECTION INTRAVENOUS
Status: DISCONTINUED | OUTPATIENT
Start: 2019-09-30 | End: 2019-10-01 | Stop reason: HOSPADM

## 2019-09-30 RX ORDER — POTASSIUM CHLORIDE 1.5 G/1.58G
20-40 POWDER, FOR SOLUTION ORAL
Status: DISCONTINUED | OUTPATIENT
Start: 2019-09-30 | End: 2019-10-01 | Stop reason: HOSPADM

## 2019-09-30 RX ORDER — POTASSIUM CL/LIDO/0.9 % NACL 10MEQ/0.1L
10 INTRAVENOUS SOLUTION, PIGGYBACK (ML) INTRAVENOUS
Status: DISCONTINUED | OUTPATIENT
Start: 2019-09-30 | End: 2019-10-01 | Stop reason: HOSPADM

## 2019-09-30 RX ORDER — POTASSIUM CHLORIDE 7.45 MG/ML
10 INJECTION INTRAVENOUS
Status: DISCONTINUED | OUTPATIENT
Start: 2019-09-30 | End: 2019-10-01 | Stop reason: HOSPADM

## 2019-09-30 RX ORDER — POTASSIUM CHLORIDE 750 MG/1
20-40 TABLET, EXTENDED RELEASE ORAL
Status: DISCONTINUED | OUTPATIENT
Start: 2019-09-30 | End: 2019-10-01 | Stop reason: HOSPADM

## 2019-09-30 RX ORDER — MAGNESIUM SULFATE HEPTAHYDRATE 40 MG/ML
4 INJECTION, SOLUTION INTRAVENOUS EVERY 4 HOURS PRN
Status: DISCONTINUED | OUTPATIENT
Start: 2019-09-30 | End: 2019-10-01 | Stop reason: HOSPADM

## 2019-09-30 RX ADMIN — APIXABAN 2.5 MG: 2.5 TABLET, FILM COATED ORAL at 21:28

## 2019-09-30 RX ADMIN — TOBRAMYCIN AND DEXAMETHASONE 1 DROP: 3; 1 SUSPENSION/ DROPS OPHTHALMIC at 16:46

## 2019-09-30 RX ADMIN — ASPIRIN 81 MG: 81 TABLET, COATED ORAL at 08:28

## 2019-09-30 RX ADMIN — Medication 1 CAPSULE: at 08:27

## 2019-09-30 RX ADMIN — TOBRAMYCIN AND DEXAMETHASONE 1 DROP: 3; 1 SUSPENSION/ DROPS OPHTHALMIC at 08:28

## 2019-09-30 RX ADMIN — HYDRALAZINE HYDROCHLORIDE 10 MG: 10 TABLET, FILM COATED ORAL at 13:46

## 2019-09-30 RX ADMIN — CARVEDILOL 3.12 MG: 3.12 TABLET, FILM COATED ORAL at 18:45

## 2019-09-30 RX ADMIN — AZATHIOPRINE 50 MG: 50 TABLET ORAL at 08:27

## 2019-09-30 RX ADMIN — ISOSORBIDE MONONITRATE 60 MG: 30 TABLET, EXTENDED RELEASE ORAL at 08:27

## 2019-09-30 RX ADMIN — Medication 50 MG: at 18:45

## 2019-09-30 RX ADMIN — HYDRALAZINE HYDROCHLORIDE 10 MG: 10 TABLET, FILM COATED ORAL at 08:27

## 2019-09-30 RX ADMIN — APIXABAN 2.5 MG: 2.5 TABLET, FILM COATED ORAL at 08:28

## 2019-09-30 RX ADMIN — Medication 1 CAPSULE: at 21:28

## 2019-09-30 RX ADMIN — CARVEDILOL 3.12 MG: 3.12 TABLET, FILM COATED ORAL at 08:27

## 2019-09-30 RX ADMIN — TOBRAMYCIN AND DEXAMETHASONE 1 DROP: 3; 1 SUSPENSION/ DROPS OPHTHALMIC at 21:27

## 2019-09-30 RX ADMIN — FUROSEMIDE 40 MG: 20 TABLET ORAL at 16:44

## 2019-09-30 RX ADMIN — Medication 1 TABLET: at 08:27

## 2019-09-30 RX ADMIN — TOBRAMYCIN AND DEXAMETHASONE 1 DROP: 3; 1 SUSPENSION/ DROPS OPHTHALMIC at 12:00

## 2019-09-30 RX ADMIN — CETIRIZINE HYDROCHLORIDE 10 MG: 10 TABLET, FILM COATED ORAL at 21:28

## 2019-09-30 RX ADMIN — HYDRALAZINE HYDROCHLORIDE 10 MG: 10 TABLET, FILM COATED ORAL at 21:28

## 2019-09-30 RX ADMIN — LEVOTHYROXINE SODIUM 75 MCG: 75 TABLET ORAL at 08:28

## 2019-09-30 ASSESSMENT — ACTIVITIES OF DAILY LIVING (ADL)
ADLS_ACUITY_SCORE: 17
ADLS_ACUITY_SCORE: 17
ADLS_ACUITY_SCORE: 15

## 2019-09-30 NOTE — PLAN OF CARE
Status: Pt admitted under Med [facility] 4 with 1 month of dyspnea on exertion and SOB.   Vitals: VSS on RA.   Neuros: Intact.   IV: PIV SL   Resp: SOB exertion and speaking  Diet: 1G Na, tolerating well.   Bowel status: No BM this shift. Passing gas.   : Voiding spont, good UOP.   Skin: WDL    Pain: Denies   Activity: Up with SBA, GB. Calls appropriately. Up to chair for meals.   Social: No family present this shift, but were on day shift.   Plan: Cont to belia.

## 2019-09-30 NOTE — PLAN OF CARE
Status: Pt admitted for weakness and SOB with exertion   Vitals: VSS   Neuros: A/Ox4, denies n/t, generalized weakness  IV: PIV SL  Resp/trach: SOB on exertion, RA  Diet: 1gm Na Diet  Bowel status: No BM this shift, BS+  : Voiding spontaneously   Skin: Fragile with bruising throughout   Pain: Denies  Activity: SBA/walker  Plan: Continue to monitor and assess.

## 2019-09-30 NOTE — PLAN OF CARE
Status: Pt admitted with Dyspnea on exertion and weakness .  Vitals: VSS   IV: PIV SL  Resp/trach: SOB is improved today, per patient.Sats 99% on RA.  Diet: 1gm Na Diet  Bowel status: Had Bm this shift, BS+  : Voiding spontaneously   Pain: Denies  Activity: SBA/walker halls and to bathroom.Has been up in chair most of shift.  Plan: Probable discharge tomorrow with Home PT.

## 2019-09-30 NOTE — PROGRESS NOTES
Social Work: Assessment with Discharge Plan    Patient Name:  Linda Spicer  :  1931  Age:  88 year old  MRN:  [id number]  Risk/Complexity Score:  Filed Complexity Screen Score: 10  Completed assessment with:  Patient and son (Arjun)    Presenting Information   Reason for Referral: Assessment, Discharge Planning  Date of Intake:  2019  Referral Source: Case Finding  Decision Maker:  Patient  Alternate Decision Maker:  Daughter (Toya)  Health Care Directive:  Copy in Chart  Living Situation:  Pt lives alone in an indep senior's apt at [facility].  There are no steps to enter the building.  Pt's apt is on the main floor. Pt has laundry facility's within her apt unit.  Pt's apt has two bathrooms (tub/shower combo and step in shower).  Previous Functional Status:  Prior to hospitalization, pt was indep with all mobility (no falls in the past year, no assistive device when in her apt, 4ww when leaving her apt), adl's, with completion of laundry and cooking tasks and with bi-weekly medication set up.  Pt has a bi-weekly privately hired  who in addition to completing housekeeping tasks, launders pt's linens.  Pt's building has a dining room where meals are served, however, pt does not utilize this service indicating that the meals served are to high in sodium.  Pt was indep with cooking.  Pt's daughter (Xi) completes pt's grocery shopping tasks.  Pt states that when she cooks, she cooks in large quantity's so that she can freeze meals.  Pt' uses a timer to remind herself to take meds.  Pt's adult children (Arjun, Xi and :Leonardo) provide pt with transportation.    Pt has grab bars in her shower, a 4ww, a bed rail with a light on it, a reacher, hh shower nozzle, heightened toilet, a shower chair, a cane, a standard walker and a cane.  Pt's building offers transportation to and from grocery shopping.    Patient and family understanding of hospitalization:  Shortness of  breath  Cultural/Language/Spiritual Considerations:  Spiritism  Adjustment to Illness:  Appropriate for situation    Physical Health  Reason for Admission:    1. RUSSELL (dyspnea on exertion)    2. Dyspnea on exertion    3. Acute on chronic right-sided congestive heart failure (H)    4. Atrial fibrillation, unspecified type (H)    5. Long term (current) use of anticoagulants    6. Cardiac pacemaker in situ    7. SOB (shortness of breath)      Services Needed/Recommended:  Home with Home Care.  The Maci Hackett RN CC will follow up in regards to arranging for skilled home care services.    Mental Health/Chemical Dependency  Diagnosis:  Not applicable  Support/Services in Place:  Not applicable  Services Needed/Recommended:  Not applicable    Support System  Significant relationship at present time:  Adult children including Arjun ([place]), Xi ([place]) and Leonardo ([place]) are actively involved.  Family of origin is available for support:  Arjun, Xi and Leonardo are available for hands on support  Other support available:  Adult children, Misti ([place]), Tanya ([place]) and Lars ([place]), as well as friends within pt's building.  Gaps in support system:  None identified  Patient is caregiver to:  None     Provider Information   Primary Care Physician:  Tre Lockett   [phone]   Clinic:  [address] / [place] [facility] [address]      :  None    Financial   Income Source:  Social Security  Financial Concerns:  None indicated  Insurance:    Payor/Plan Subscriber Name Rel Member # Group #   MEDICARE - MEDICARE RG WALTER  [id number]       ATTN CLAIMS, [address]   BCBS - BCBS OF [place]  [id number]      [address] [address]       Discharge Plan   Patient and family discharge goal:  Return to home.  Provided education on discharge plan:  YES  Patient agreeable to discharge plan:  YES  A list of Medicare Certified Facilities was provided to the patient and/or family to  "encourage patient choice. Patient's choices for facility are:  Not applicable.    Barriers to discharge:  None at this time    Discharge Recommendations   Anticipated Disposition:  Return to home with skilled home care      Additional comments   - Pt is considering using [employer] for grocery delivery.   Arjun indicates that family will teach pt this process if pt decides to utilize this service.  - Pt has utilized and emergency response system (like [employer]) in the past but states that the battery on the necklace wore out to fast.  Pt and Arjun indicate that they would be interested in [employer] if it has GPS so that it would be affective even if pt was not in her apt.  [employer] offers GPS, thus, SW has provided pt/son with program information and instructions regarding how to apply on line.  -  Pt states that she may be interested in home delivered meals. SW provided pt/Arjun with information (and contact phone number) for \"[employer].\"  - Pt is .  Pt has previous experience with \"[employer]\" (private pay extended hours) as she used this agency when her  was living.  Pt states that she may need help with laundering her personal clothing and if so, pt plans to contact [employer] to privately hire assistance.    No further SW intervention planned at this time as return to home is anticipated.  SW available should add'l needs arise.    YARELIS Moore  Social Work, 6A  Phone:  [phone]  Pager:  [phone]  9/30/2019      "

## 2019-09-30 NOTE — PLAN OF CARE
6A Discharge Planner PT   Patient plan for discharge: would like to go home  Current status: Progressed gait endurance with 4WW, Galileo - ambulated ~ 80 ft + ~ 80 ft: slow gait, frequent very short standing rest breaks and 1 x long seated rest breaks. Pt insightful when needing rest breaks 2/2 SOB and fatigue: pt verbalized great fatigue management skills. STS, Galileo with 4WW. EOB> supine, IND. Discussed discharge plans. Recommend SBA for frequent walks + FWW (at least 3-4 walks/day) to improve endurance in between therapy sessions.     Barriers to return to prior living situation: medical status, SOB, activity tolerance  Recommendations for discharge: home + HH PT + home care   Rationale for recommendations: Pt is demonstrating improvement with activity tolerance and SOB with IP PT. Pt would greatly benefit from continued HH PT and home care services. Pt would like to look into ways at which meals can be prepped for her though has a pretty restrictive diet.        Entered by: Sonja Ledesma 09/30/2019 11:04 AM

## 2019-09-30 NOTE — PROGRESS NOTES
Care Coordinator Progress Note    Admission Date/Time:  9/25/2019  Attending MD:  Pedro Luis Bansal*    Data  Chart reviewed, discussed with interdisciplinary team.   Patient was admitted for:    Dyspnea on exertion  Acute on chronic right-sided congestive heart failure (H)  Atrial fibrillation, unspecified type (H)  Long term (current) use of anticoagulants  Cardiac pacemaker in situ  RUSSELL (dyspnea on exertion)  SOB (shortness of breath).    Concerns with insurance coverage for discharge needs: None.  Current Living Situation: Patient lives alone in independent senior apartment.   Support System: Supportive and Involved  Services Involved: None prior to admission.   Transportation at Discharge: Family or friend will provide  Transportation to Medical Appointments:   - Not applicable  Barriers to Discharge: Medical stability.     Coordination of Care and Referrals: Provided patient/family with options for Home Care.        Assessment  Patient is a 88yr old female with a prior medical history of RA c/b IPF, HFpEF, HTN, CKD, and hypothyroid who was admitted for progressive dyspnea and weakness. Writer introduced self and role of RNCC. Patient resides in the independent senior living within [facility]. Patient's son, Arjun is at the bedside, involved and supportive. PT jalen today recommended discharge to home w/home care services. Patient has used [facility] in the past and would like a referral sent to them for RN/PT/HHA. Orders placed at this time. Patient voices no further discharge concerns, notes that she has all of the equipment she needs in the home and that her daughters will be staying with her for the next couple of days after discharge. Patient's daughter will arrive by 10/10:30am tomorrow to assist with transporting home. RNCC will continue to follow and assist with discharge planning.     [facility]  Phone: [phone]  Fax:  [phone]    Plan  Anticipated Discharge Date: 10/1?  Anticipated Discharge Plan:  Return to independent senior living w/home care services.     Kelley Vigren, SARAH, BSN    [facility]    Medicine Group  [address] [place] [id number]    [email or web address]    Office: [phone] Pager: [phone]  To contact weekend RNCC, dial * [phone] and enter pager number [phone] at prompt. This pager can not be contacted by text page or outside line.

## 2019-09-30 NOTE — PROGRESS NOTES
Patient seen by Physical Therapy on 9/27/19 and 9/29/19. Physical Therapy recommends TCU stay. SW to follow.    YARELIS Moore  Social Work, 6A  Phone:  [phone]  Pager:  [phone]  9/30/2019

## 2019-09-30 NOTE — PROGRESS NOTES
[facility], [place]    Progress Note - Mcai 4 Service        Date of Admission:  9/25/2019    Assessment & Plan   Linda Spicer is a 88 year old female admitted on 9/25/2019. She has a pertinent history including RA c/b IPF, HFpEF (LVEF 55-60% on 7/5/2019), HTN, CKD (baseline Cr 1.4-1.6), hypothyroid, and is admitted for progressive dyspnea and weakness.     Changes today 9/30/19:  -Pacemaker interrogation ordered  -Lasix home regimen held    Acute on chronic dyspnea  Patient presents with at least a month of progressive dyspnea to the point of limiting conversation without feeling SOB. Also reporting fatigue. She has been afebrile, and is saturating well on RA. No leukocytosis. VBG WNL. CRP negative. Pro-BNP mildly elevated. TSH WNL. CTPE 9/26 w/o evidence of PE and stable interstitial disease. BLE US 9/26 w/o evidence of DVT. Imaging and not suggestive of pulmonary edema or worsening heart failure at this time. BiPAP ordered for nighttime use but not tolerated on attempt 9/28-29. Patient also requested a palliative care consult to begin GOC discussions with her family.  -Pacemaker interrogation ordered, pending results  -Palliative care signed off  -Pulmonary following, appreciate recs     Chronic Medical Problems  RA -continue PTA imuran, trimethoprim  CKD - stable, at baseline. Monitor AM labs  Hypothyroid -continue levothyroxine 75 mg daily. TSH 1.13.  HTN -continue PTA carvedilol, lasix (40mg qAM, 20mg qPM), imdur, ASA  Atrial fibrillation -continue PTA Eliquis 2.5 mg BID  Tachybrady syndrome s/p PPM -device interrogated without signs of malfunction  Hx MR s/p MV clip (2/2019) -monitor clinically, YOLANDA as above       Diet: 1 Gram Sodium Diet    Fluids: None  Lines: PIV  DVT Prophylaxis: Apixiban  Gallagher Catheter: not present  Code Status: DNR      Disposition Plan   Expected discharge: 2 - 3 days, recommended to transitional care unit once safe disposition plan/ TCU bed  available.  Entered: Isaiah Hamilton MD 09/30/2019, 2:52 PM       The patient's care was discussed with the Attending Physician, Dr. Bansal, Bedside Nurse and Patient.    Isaiah Hamilton MD  [address], [place]  Pager: [phone]  Please see sticky note for cross cover information  ______________________________________________________________________    Interval History   Nursing notes reviewed, no acute events overnight. Patient states she feels better compared to yesterday. She also notes she walked further with PT than prior days, but still complains of feeling exhausted at the end of her excursion. No fever, chills, CP, SOB, cough, abd pain, n/v/d. She maintains a good appetite and is still in high spirits.    Data reviewed today: I reviewed all medications, new labs and imaging results over the last 24 hours. I personally reviewed no images or EKG's today.    Physical Exam   Vital Signs: Temp: 97.8  F (36.6  C) Temp src: Oral BP: 132/68 Pulse: 71 Heart Rate: 71 Resp: 16 SpO2: 97 % O2 Device: None (Room air)    Weight: 166 lbs 6.4 oz     General Appearance: NAD, pleasant, conversant, sitting up in bed.  Respiratory: Increased WOB, bilateral inspiratory crackles most prominent at bases.  Cardiovascular: RRR, no MRG, normal S1 and S2  GI: Soft, nondistended.  Neuro: Normal speech, no FND.    Data   Recent Labs   Lab 09/30/19  0641 09/29/19  0718 09/28/19  0713  09/25/19  1020   WBC 5.0 6.4 5.4   < > 5.2   HGB 12.8 12.7 12.6   < > 12.5    101* 101*   < > 103*    220 217   < > 235   INR  --   --   --   --  1.26*    140 140   < > 140   POTASSIUM 3.5 3.7 3.8   < > 4.0   CHLORIDE 103 102 103   < > 106   CO2 28 27 27   < > 30   BUN 49* 50* 45*   < > 36*   CR 1.54* 1.43* 1.31*   < > 1.48*   ANIONGAP 9 10 10   < > 4   FATOUMATA 8.6 8.9 8.8   < > 8.9   GLC 84 86 83   < > 65*   ALBUMIN  --   --   --   --  3.5   PROTTOTAL  --   --   --   --  7.2   BILITOTAL   --   --   --   --  0.5   ALKPHOS  --   --   --   --  64   ALT  --   --   --   --  29   AST  --   --   --   --  26   TROPI  --   --   --   --  <0.015    < > = values in this interval not displayed.     No results found for this or any previous visit (from the past 24 hour(s)).  Medications     - MEDICATION INSTRUCTIONS -       - MEDICATION INSTRUCTIONS -       - MEDICATION INSTRUCTIONS -         apixaban ANTICOAGULANT  2.5 mg Oral BID     aspirin  81 mg Oral Daily     azaTHIOprine  50 mg Oral Daily     calcium carbonate 600 mg-vitamin D 400 units  1 tablet Oral Daily     carvedilol  3.125 mg Oral BID w/meals     cetirizine  10 mg Oral At Bedtime     furosemide  40 mg Oral BID     hydrALAZINE  10 mg Oral TID     isosorbide mononitrate  60 mg Oral Daily     levothyroxine  75 mcg Oral Daily     multivitamin  with lutein  1 capsule Oral BID     sodium chloride (PF)  3 mL Intracatheter Q8H     tobramycin-dexamethasone  1 drop Both Eyes Q4H While awake     trimethoprim  50 mg Oral Daily

## 2019-10-01 ENCOUNTER — ANCILLARY PROCEDURE (OUTPATIENT)
Dept: CARDIOLOGY | Facility: CLINIC | Age: 84
DRG: 204 | End: 2019-10-01
Attending: INTERNAL MEDICINE
Payer: MEDICARE

## 2019-10-01 ENCOUNTER — PATIENT OUTREACH (OUTPATIENT)
Dept: CARE COORDINATION | Facility: CLINIC | Age: 84
End: 2019-10-01

## 2019-10-01 ENCOUNTER — APPOINTMENT (OUTPATIENT)
Dept: PHYSICAL THERAPY | Facility: CLINIC | Age: 84
DRG: 204 | End: 2019-10-01
Attending: STUDENT IN AN ORGANIZED HEALTH CARE EDUCATION/TRAINING PROGRAM
Payer: MEDICARE

## 2019-10-01 VITALS
TEMPERATURE: 97.5 F | BODY MASS INDEX: 28.41 KG/M2 | OXYGEN SATURATION: 96 % | HEIGHT: 64 IN | WEIGHT: 166.4 LBS | DIASTOLIC BLOOD PRESSURE: 49 MMHG | RESPIRATION RATE: 16 BRPM | SYSTOLIC BLOOD PRESSURE: 119 MMHG | HEART RATE: 71 BPM

## 2019-10-01 DIAGNOSIS — N18.30 CKD (CHRONIC KIDNEY DISEASE) STAGE 3, GFR 30-59 ML/MIN (H): ICD-10-CM

## 2019-10-01 DIAGNOSIS — K58.9 IRRITABLE BOWEL SYNDROME: ICD-10-CM

## 2019-10-01 DIAGNOSIS — Z48.89 AFTERCARE FOLLOWING SURGERY: ICD-10-CM

## 2019-10-01 DIAGNOSIS — I48.91 ATRIAL FIBRILLATION, UNSPECIFIED TYPE (H): ICD-10-CM

## 2019-10-01 DIAGNOSIS — R06.09 DOE (DYSPNEA ON EXERTION): ICD-10-CM

## 2019-10-01 DIAGNOSIS — Z98.890 S/P MITRAL VALVE CLIP IMPLANTATION: ICD-10-CM

## 2019-10-01 DIAGNOSIS — I50.30 (HFPEF) HEART FAILURE WITH PRESERVED EJECTION FRACTION (H): ICD-10-CM

## 2019-10-01 DIAGNOSIS — Z95.818 S/P MITRAL VALVE CLIP IMPLANTATION: ICD-10-CM

## 2019-10-01 DIAGNOSIS — E03.9 HYPOTHYROIDISM: ICD-10-CM

## 2019-10-01 DIAGNOSIS — Z98.890 S/P LUMBAR LAMINECTOMY: ICD-10-CM

## 2019-10-01 DIAGNOSIS — F43.22 ADJUSTMENT DISORDER WITH ANXIOUS MOOD: ICD-10-CM

## 2019-10-01 DIAGNOSIS — R33.9 URINARY RETENTION: ICD-10-CM

## 2019-10-01 DIAGNOSIS — I49.5 SICK SINUS SYNDROME (H): ICD-10-CM

## 2019-10-01 DIAGNOSIS — D84.9 IMMUNOSUPPRESSION (H): ICD-10-CM

## 2019-10-01 DIAGNOSIS — M05.9 SEROPOSITIVE RHEUMATOID ARTHRITIS (H): ICD-10-CM

## 2019-10-01 DIAGNOSIS — I50.9 CHF EXACERBATION (H): ICD-10-CM

## 2019-10-01 DIAGNOSIS — I48.19 PERSISTENT ATRIAL FIBRILLATION (H): ICD-10-CM

## 2019-10-01 DIAGNOSIS — D64.9 MILD ANEMIA: ICD-10-CM

## 2019-10-01 DIAGNOSIS — B95.2 ENTEROCOCCUS UTI: ICD-10-CM

## 2019-10-01 DIAGNOSIS — R06.09 DYSPNEA ON EXERTION: ICD-10-CM

## 2019-10-01 DIAGNOSIS — Z76.89 HEALTH CARE HOME: ICD-10-CM

## 2019-10-01 DIAGNOSIS — R93.89 ABNORMAL FINDINGS ON DIAGNOSTIC IMAGING OF CARDIOVASCULAR SYSTEM: ICD-10-CM

## 2019-10-01 DIAGNOSIS — M75.41 IMPINGEMENT SYNDROME OF BOTH SHOULDERS: ICD-10-CM

## 2019-10-01 DIAGNOSIS — H35.30 MACULAR DEGENERATION, LEFT EYE: ICD-10-CM

## 2019-10-01 DIAGNOSIS — R06.00 DYSPNEA: ICD-10-CM

## 2019-10-01 DIAGNOSIS — J30.89 SEASONAL ALLERGIC RHINITIS DUE TO OTHER ALLERGIC TRIGGER: ICD-10-CM

## 2019-10-01 DIAGNOSIS — R55 PRE-SYNCOPE: ICD-10-CM

## 2019-10-01 DIAGNOSIS — M05.79 RHEUMATOID ARTHRITIS INVOLVING MULTIPLE SITES WITH POSITIVE RHEUMATOID FACTOR (H): ICD-10-CM

## 2019-10-01 DIAGNOSIS — Z79.899 HIGH RISK MEDICATIONS (NOT ANTICOAGULANTS) LONG-TERM USE: ICD-10-CM

## 2019-10-01 DIAGNOSIS — N39.3 FEMALE STRESS INCONTINENCE: ICD-10-CM

## 2019-10-01 DIAGNOSIS — Z51.5 ENCOUNTER FOR PALLIATIVE CARE: ICD-10-CM

## 2019-10-01 DIAGNOSIS — I50.30 HEART FAILURE WITH PRESERVED EJECTION FRACTION, NYHA CLASS I (H): ICD-10-CM

## 2019-10-01 DIAGNOSIS — I34.0 MITRAL VALVE INSUFFICIENCY, UNSPECIFIED ETIOLOGY: ICD-10-CM

## 2019-10-01 DIAGNOSIS — J84.112 UIP (USUAL INTERSTITIAL PNEUMONITIS) (H): ICD-10-CM

## 2019-10-01 DIAGNOSIS — N95.2 ATROPHIC VAGINITIS: ICD-10-CM

## 2019-10-01 DIAGNOSIS — J84.9 ILD (INTERSTITIAL LUNG DISEASE) (H): ICD-10-CM

## 2019-10-01 DIAGNOSIS — I50.30 HEART FAILURE WITH PRESERVED EJECTION FRACTION (H): ICD-10-CM

## 2019-10-01 DIAGNOSIS — I50.30 CONGESTIVE HEART FAILURE WITH PRESERVED LV FUNCTION, NYHA CLASS 3 (H): ICD-10-CM

## 2019-10-01 DIAGNOSIS — I34.0 MITRAL REGURGITATION: ICD-10-CM

## 2019-10-01 DIAGNOSIS — E03.8 OTHER SPECIFIED HYPOTHYROIDISM: ICD-10-CM

## 2019-10-01 DIAGNOSIS — R06.02 SOB (SHORTNESS OF BREATH): ICD-10-CM

## 2019-10-01 DIAGNOSIS — Z79.01 LONG TERM (CURRENT) USE OF ANTICOAGULANTS: ICD-10-CM

## 2019-10-01 DIAGNOSIS — I50.813 ACUTE ON CHRONIC RIGHT-SIDED CONGESTIVE HEART FAILURE (H): ICD-10-CM

## 2019-10-01 DIAGNOSIS — M81.0 AGE-RELATED OSTEOPOROSIS WITHOUT CURRENT PATHOLOGICAL FRACTURE: ICD-10-CM

## 2019-10-01 DIAGNOSIS — M51.369 DDD (DEGENERATIVE DISC DISEASE), LUMBAR: ICD-10-CM

## 2019-10-01 DIAGNOSIS — Z95.0 CARDIAC PACEMAKER IN SITU: ICD-10-CM

## 2019-10-01 DIAGNOSIS — M79.89 SWELLING OF RIGHT LOWER EXTREMITY: ICD-10-CM

## 2019-10-01 DIAGNOSIS — I48.92 ATRIAL FLUTTER (H): ICD-10-CM

## 2019-10-01 DIAGNOSIS — Z92.89 HISTORY OF BLOOD TRANSFUSION: ICD-10-CM

## 2019-10-01 DIAGNOSIS — M75.42 IMPINGEMENT SYNDROME OF BOTH SHOULDERS: ICD-10-CM

## 2019-10-01 DIAGNOSIS — Z98.890 STATUS POST CORONARY ANGIOGRAM: ICD-10-CM

## 2019-10-01 DIAGNOSIS — I10 HYPERTENSION GOAL BP (BLOOD PRESSURE) < 150/90: ICD-10-CM

## 2019-10-01 DIAGNOSIS — Z79.899 HIGH RISK MEDICATION USE: ICD-10-CM

## 2019-10-01 DIAGNOSIS — N39.0 ENTEROCOCCUS UTI: ICD-10-CM

## 2019-10-01 DIAGNOSIS — R15.9 FECAL INCONTINENCE: ICD-10-CM

## 2019-10-01 DIAGNOSIS — N28.9 RENAL INSUFFICIENCY: ICD-10-CM

## 2019-10-01 PROBLEM — I50.33 ACUTE ON CHRONIC DIASTOLIC CONGESTIVE HEART FAILURE (H): Status: ACTIVE | Noted: 2019-10-01

## 2019-10-01 PROBLEM — I50.33 ACUTE ON CHRONIC DIASTOLIC CONGESTIVE HEART FAILURE (H): Status: RESOLVED | Noted: 2019-10-01 | Resolved: 2019-10-01

## 2019-10-01 PROBLEM — R06.03 ACUTE RESPIRATORY DISTRESS: Status: ACTIVE | Noted: 2019-10-01

## 2019-10-01 LAB
ANION GAP SERPL CALCULATED.3IONS-SCNC: 10 MMOL/L (ref 3–14)
BUN SERPL-MCNC: 53 MG/DL (ref 7–30)
CALCIUM SERPL-MCNC: 8.5 MG/DL (ref 8.5–10.1)
CHLORIDE SERPL-SCNC: 104 MMOL/L (ref 94–109)
CO2 SERPL-SCNC: 24 MMOL/L (ref 20–32)
CREAT SERPL-MCNC: 1.62 MG/DL (ref 0.52–1.04)
ERYTHROCYTE [DISTWIDTH] IN BLOOD BY AUTOMATED COUNT: 14.3 % (ref 10–15)
GFR SERPL CREATININE-BSD FRML MDRD: 28 ML/MIN/{1.73_M2}
GLUCOSE SERPL-MCNC: 82 MG/DL (ref 70–99)
HCT VFR BLD AUTO: 37.4 % (ref 35–47)
HGB BLD-MCNC: 12.2 G/DL (ref 11.7–15.7)
MCH RBC QN AUTO: 32.5 PG (ref 26.5–33)
MCHC RBC AUTO-ENTMCNC: 32.6 G/DL (ref 31.5–36.5)
MCV RBC AUTO: 100 FL (ref 78–100)
PLATELET # BLD AUTO: 209 10E9/L (ref 150–450)
POTASSIUM SERPL-SCNC: 4.1 MMOL/L (ref 3.4–5.3)
RBC # BLD AUTO: 3.75 10E12/L (ref 3.8–5.2)
SODIUM SERPL-SCNC: 138 MMOL/L (ref 133–144)
WBC # BLD AUTO: 5.4 10E9/L (ref 4–11)

## 2019-10-01 PROCEDURE — 85027 COMPLETE CBC AUTOMATED: CPT | Performed by: STUDENT IN AN ORGANIZED HEALTH CARE EDUCATION/TRAINING PROGRAM

## 2019-10-01 PROCEDURE — 97116 GAIT TRAINING THERAPY: CPT | Mod: GP

## 2019-10-01 PROCEDURE — 25000132 ZZH RX MED GY IP 250 OP 250 PS 637: Mod: GY | Performed by: STUDENT IN AN ORGANIZED HEALTH CARE EDUCATION/TRAINING PROGRAM

## 2019-10-01 PROCEDURE — 25000125 ZZHC RX 250: Performed by: STUDENT IN AN ORGANIZED HEALTH CARE EDUCATION/TRAINING PROGRAM

## 2019-10-01 PROCEDURE — 80048 BASIC METABOLIC PNL TOTAL CA: CPT | Performed by: STUDENT IN AN ORGANIZED HEALTH CARE EDUCATION/TRAINING PROGRAM

## 2019-10-01 PROCEDURE — 97110 THERAPEUTIC EXERCISES: CPT | Mod: GP

## 2019-10-01 PROCEDURE — 25000131 ZZH RX MED GY IP 250 OP 636 PS 637: Mod: GY | Performed by: STUDENT IN AN ORGANIZED HEALTH CARE EDUCATION/TRAINING PROGRAM

## 2019-10-01 PROCEDURE — 99239 HOSP IP/OBS DSCHRG MGMT >30: CPT | Mod: GC | Performed by: INTERNAL MEDICINE

## 2019-10-01 PROCEDURE — 93280 PM DEVICE PROGR EVAL DUAL: CPT | Mod: 26 | Performed by: INTERNAL MEDICINE

## 2019-10-01 PROCEDURE — 93280 PM DEVICE PROGR EVAL DUAL: CPT

## 2019-10-01 PROCEDURE — 36415 COLL VENOUS BLD VENIPUNCTURE: CPT | Performed by: STUDENT IN AN ORGANIZED HEALTH CARE EDUCATION/TRAINING PROGRAM

## 2019-10-01 RX ORDER — VIT A/VIT C/VIT E/ZINC/COPPER 4296-226
1 CAPSULE ORAL 2 TIMES DAILY
Qty: 30 CAPSULE | Refills: 1 | Status: SHIPPED | OUTPATIENT
Start: 2019-10-01

## 2019-10-01 RX ORDER — POTASSIUM CHLORIDE 20MEQ/15ML
20 LIQUID (ML) ORAL ONCE
Status: COMPLETED | OUTPATIENT
Start: 2019-10-01 | End: 2019-10-01

## 2019-10-01 RX ORDER — CETIRIZINE HYDROCHLORIDE 10 MG/1
10 TABLET ORAL AT BEDTIME
Qty: 30 TABLET | Refills: 0 | Status: SHIPPED | OUTPATIENT
Start: 2019-10-01 | End: 2019-10-10

## 2019-10-01 RX ORDER — FUROSEMIDE 40 MG
40 TABLET ORAL
Status: CANCELLED | OUTPATIENT
Start: 2019-10-01

## 2019-10-01 RX ADMIN — LEVOTHYROXINE SODIUM 75 MCG: 75 TABLET ORAL at 09:43

## 2019-10-01 RX ADMIN — POTASSIUM CHLORIDE 20 MEQ: 20 SOLUTION ORAL at 03:38

## 2019-10-01 RX ADMIN — ISOSORBIDE MONONITRATE 60 MG: 30 TABLET, EXTENDED RELEASE ORAL at 09:42

## 2019-10-01 RX ADMIN — Medication 1 CAPSULE: at 09:42

## 2019-10-01 RX ADMIN — AZATHIOPRINE 50 MG: 50 TABLET ORAL at 09:42

## 2019-10-01 RX ADMIN — HYDRALAZINE HYDROCHLORIDE 10 MG: 10 TABLET, FILM COATED ORAL at 09:42

## 2019-10-01 RX ADMIN — ASPIRIN 81 MG: 81 TABLET, COATED ORAL at 09:43

## 2019-10-01 RX ADMIN — FUROSEMIDE 40 MG: 20 TABLET ORAL at 09:43

## 2019-10-01 RX ADMIN — APIXABAN 2.5 MG: 2.5 TABLET, FILM COATED ORAL at 09:43

## 2019-10-01 RX ADMIN — CARVEDILOL 3.12 MG: 3.12 TABLET, FILM COATED ORAL at 09:43

## 2019-10-01 RX ADMIN — Medication 1 TABLET: at 09:43

## 2019-10-01 RX ADMIN — TOBRAMYCIN AND DEXAMETHASONE 1 DROP: 3; 1 SUSPENSION/ DROPS OPHTHALMIC at 09:43

## 2019-10-01 ASSESSMENT — ACTIVITIES OF DAILY LIVING (ADL)
ADLS_ACUITY_SCORE: 17

## 2019-10-01 ASSESSMENT — VISUAL ACUITY: OU: NORMAL ACUITY;BASELINE

## 2019-10-01 NOTE — PROGRESS NOTES
Patient discharged home today. Patient is stable and medically ready for discharge. Patient was sent home with all belongings and discharge medications were sent with patient. Discharge paperwork was given to and discussed with patient. Patient verbalized understanding of discharge instructions and follow up appointments. Will continue to monitor.

## 2019-10-01 NOTE — PLAN OF CARE
6A Discharge Planner PT   Patient plan for discharge: [facility] living facility with increased services + HH PT  Current status: VSS on RA. Pt reports increased pressure in head (not a HA, no dizziness/lightheadedness reported). Progressed gait endurance with 4WW for ~ 100 ft x 2. Decreased gait speed. Multiple short standing rest breaks 2/2 SOB and fatigue.   Recommend SBA for frequent walks + FWW (at least 3-4 walks/day) to improve endurance in between therapy sessions.      Barriers to return to prior living situation: medical status  Recommendations for discharge: home + HH PT + home care   Rationale for recommendations: Pt is demonstrating improvement with activity tolerance and SOB with IP PT. Pt would greatly benefit from continued HH PT and home care services. PT recommends continued use of 4WW for all mobility.       Entered by: Sonja Ledesma 10/01/2019 10:41 AM

## 2019-10-01 NOTE — PROGRESS NOTES
"CLINICAL NUTRITION SERVICES    Reason for Assessment:  Low-sodium (1 g/day) nutrition education - \"Linda's diet is severely restricted as she is very strict with her low sodium diet and will not have more than 1000mg of sodium a day\".    Diet History:  Per chart review, pt was last seen by RD on 1/16/19 at which time pt reported history of low sodium diet education. Pt was receiving 1 low sodium meal per day from a meal delivery program ([employer]) in addition to avoiding the salt shaker, using frozen vegetables, and being mindful at restaurants about sodium content.     Met with pt today who reports that she is doing as well as she can with her sodium restriction. Pt reports that she prepares meals at home using sodium free seasonings (Mrs DASH) and herbs (thyme, rosemary). Pt continues to use nutrition labels to help her adhere to her restriction and reports dedicating a lot of time/effort into low sodium meal planning. Pt's son is looking into additional options for medically tailored meal delivery programs. Pt is open to discussing low sodium meal ideas with RD today.     Nutrition Prescription/Recs:  Continue low-sodium diet.      Interventions:  Nutrition Education  1. Provided verbal instruction on low-sodium meal planning.  2. Provided the following handouts: Seasoning Your Foods Without Adding Salt, and Heart Healthy Recipes.     Goals:    Pt will verbalize at least five high sodium foods and the importance of avoiding added salt to foods for cooking or seasoning foods.     Follow-up:   Patient to ask any further nutrition-related questions before discharge. In addition, pt may request outpatient RD appointment.    Norbert Johnson MS, RD, LD  6A/7D pager [phone]     "

## 2019-10-01 NOTE — PROGRESS NOTES
Alert and oriented x4. Denied pain. Up to bathroom with assist 1 x1. K+ 3.9. Possible discharge tomorrow.

## 2019-10-01 NOTE — PLAN OF CARE
Admitted for dyspnea. SOB improving. VSS on RA. PIV SL. 1 gram diet. Up SBA/walker. Voids without difficulty. Alert and oriented x4. . Plan discharge home today. Continue to monitor.

## 2019-10-01 NOTE — PROGRESS NOTES
Care Coordinator - Discharge Planning    Admission Date/Time:  9/25/2019  Attending MD:  Wendi att. providers found     Data  Date of initial CC assessment:  9/30/2019  Chart reviewed, discussed with interdisciplinary team.   Patient was admitted for:   1. RUSSELL (dyspnea on exertion)    2. Dyspnea on exertion    3. Acute on chronic right-sided congestive heart failure (H)    4. Atrial fibrillation, unspecified type (H)    5. Long term (current) use of anticoagulants    6. Cardiac pacemaker in situ    7. SOB (shortness of breath)    8. Seasonal allergic rhinitis due to other allergic trigger    9. Heart failure with preserved ejection fraction (H)    10. Rheumatoid arthritis involving multiple sites with positive rheumatoid factor (H)    11. (HFpEF) heart failure with preserved ejection fraction (H)    12. Dyspnea    13. Hypertension goal BP (blood pressure) < 150/90    14. Hypothyroidism    15. Seropositive rheumatoid arthritis (H)    16. Macular degeneration, left eye    17. Irritable bowel syndrome    18. Encounter for palliative care    19. Adjustment disorder with anxious mood    20. Mild anemia    21. DDD (degenerative disc disease), lumbar    22. CKD (chronic kidney disease) stage 3, GFR 30-59 ml/min (H)    23. History of blood transfusion    24. Aftercare following surgery    25. S/P lumbar laminectomy    26. High risk medication use    27. Age-related osteoporosis without current pathological fracture    28. Atrophic vaginitis    29. Fecal incontinence    30. Female stress incontinence    31. Impingement syndrome of both shoulders    32. UIP (usual interstitial pneumonitis) (H)    33. High risk medications (not anticoagulants) long-term use    34. Congestive heart failure with preserved LV function, NYHA class 3 (H)    35. Other specified hypothyroidism    36. Health Care Home    37. Sick sinus syndrome (H)    38. Cardiac pacemaker - Medtronic dual lead pacemaker - Not Dependent - MRI Safe    39.  Immunosuppression (H)    40. ILD (interstitial lung disease) (H)    41. Heart failure with preserved ejection fraction, NYHA class I (H)    42. Atrial flutter (H)    43. Persistent atrial fibrillation    44. CHF exacerbation (H)    45. Pre-syncope    46. Mitral regurgitation    47. Mitral valve insufficiency, unspecified etiology    48. Abnormal findings on diagnostic imaging of cardiovascular system    49. Status post coronary angiogram    50. 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    51. Swelling of right lower extremity    52. Enterococcus UTI    53. Renal insufficiency    54. Urinary retention    55. Acute on chronic diastolic congestive heart failure (H)         Assessment   Full assessment completed in previous note    Coordination of Care and Referrals:   Patient discharged to home on this date. Home care orders and discharge orders have been sent to [employer] (Phone: [phone], Fax: [phone]). Patient's family provided transport.       Plan  Anticipated Discharge Date:  10/1/2019  Anticipated Discharge Plan:  Return to independent living senior apartment w/home care services.     CTS Handoff completed:  Yes    SARAH Marti, BSN    [facility]    Medicine Group  [address] [place] [address]    [email or web address]    Office: [phone] Pager: [phone]  To contact weekend RNPENNY, dial * [phone] and enter pager number [phone] at prompt. This pager can not be contacted by text page or outside line.

## 2019-10-02 ENCOUNTER — HEALTH MAINTENANCE LETTER (OUTPATIENT)
Age: 84
End: 2019-10-02

## 2019-10-02 ENCOUNTER — PATIENT OUTREACH (OUTPATIENT)
Dept: CARE COORDINATION | Facility: CLINIC | Age: 84
End: 2019-10-02

## 2019-10-02 DIAGNOSIS — Z71.89 OTHER SPECIFIED COUNSELING: ICD-10-CM

## 2019-10-02 LAB
BACTERIA SPEC CULT: NO GROWTH
BACTERIA SPEC CULT: NO GROWTH
Lab: NORMAL
Lab: NORMAL
SPECIMEN SOURCE: NORMAL
SPECIMEN SOURCE: NORMAL

## 2019-10-02 ASSESSMENT — ACTIVITIES OF DAILY LIVING (ADL): DEPENDENT_IADLS:: COOKING;CLEANING;LAUNDRY;SHOPPING;MEDICATION MANAGEMENT;MONEY MANAGEMENT;TRANSPORTATION

## 2019-10-02 NOTE — PROGRESS NOTES
Clinic Care Coordination Contact    Clinic Care Coordination Contact  OUTREACH    Referral Information:  Referral Source: IP Handoff    Primary Diagnosis: CHF    Chief Complaint   Patient presents with     Clinic Care Coordination - Post Hospital     RN        Universal Utilization: Patient is followed by cardiology, urology, rheumatology and utilizes [facility] and [facility]   Clinic Utilization  Difficulty keeping appointments:: No  Compliance Concerns: No  No-Show Concerns: No  No PCP office visit in Past Year: No  Utilization    Last refreshed: 10/2/2019  8:34 AM:  Hospital Admissions 7           Last refreshed: 10/2/2019  8:34 AM:  ED Visits 2           Last refreshed: 10/2/2019  8:34 AM:  No Show Count (past year) 1              Current as of: 10/2/2019  8:34 AM              Clinical Concerns:  Current Medical Concerns:  Patient was at [facility] 9/25/19-10/1/19 due to acute on chronic right sided CHF.  Patient reports significant improvement since the adjustment in her pacemaker.  Patient reports some shortness of breath with moderate exertion.  Weight today was 153.4 lbs, patient states 154.4 lbs is her baseline and she was up to 158 lbs in the hospital.  RN CC assisted patient in scheduling a hospital follow up appointment with PCP for 10/10/19.  [facility], PT, OT and HHA.  Patient declines needing care coordination services.      Current Behavioral Concerns: n/a      Education Provided to patient: RN CC reviewed hospital discharge instructions and care coordination services.     Pain  Pain (GOAL):: No  Health Maintenance Reviewed: Due/Overdue   Health Maintenance Due   Topic Date Due     ZOSTER IMMUNIZATION (1 of 2) 06/13/1981     MEDICARE ANNUAL WELLNESS VISIT  05/28/2015     MICROALBUMIN  07/26/2018     HF ACTION PLAN  06/21/2019      Clinical Pathway: None    Medication Management:  Patient declined complete medication  reconciliation, but noted furosemide dosing for 40 mg every morning and 20 mg every evening and potassium 20 meq 2 tabs daily.  Noted an MTM referral was placed upon hospital discharge.    Functional Status:  Dependent ADLs:: Bathing, Ambulation-walker  Dependent IADLs:: Cooking, Cleaning, Laundry, Shopping, Medication Management, Money Management, Transportation  Bed or wheelchair confined:: No  Mobility Status: Independent w/Device  Fallen 2 or more times in the past year?: No  Any fall with injury in the past year?: No    Living Situation:  Current living arrangement:: I live alone  Type of residence:: Independent Senior Living    Diet/Exercise/Sleep:  Diet:: Low cholesterol, Low saturated fat, No added salt  Inadequate nutrition (GOAL):: No  Food Insecurity: No  Tube Feeding: No  Exercise:: Currently not exercising  Inadequate activity/exercise (GOAL):: No  Significant changes in sleep pattern (GOAL): No    Transportation:  Transportation concerns (GOAL):: No  Transportation means:: Family, [place] mobility     Psychosocial:  Sikh or spiritual beliefs that impact treatment:: No  Mental health DX:: No  Mental health management concern (GOAL):: No  Informal Support system:: [place] based, Family, Friends, Neighbors, Spouse     Financial/Insurance:   Financial/Insurance concerns (GOAL):: No       Resources and Interventions:  Current Resources:   List of home care services:: Skilled Nursing, Physicial Therapy, Occupational Therapy;   Community Resources: None  Supplies used at home:: None  Equipment Currently Used at Home: raised toilet, shower chair, walker, rolling(4WW)    Advance Care Plan/Directive  Advanced Care Plans/Directives on file:: Yes  Type Advanced Care Plans/Directives: Advanced Directive - On File  Advanced Care Plan/Directive Status: Not Applicable    Referrals Placed: None     Goals: n/a        Patient/Caregiver understanding: Patient verbalized understanding of hospital discharge instructions  and stated she had family support and no needs from care coordination.       Future Appointments              In 5 days NURSE ONLY SURGERY [facility]    In 1 week [facility]    In 2 weeks [address]    In 1 month Mario Davenport MD [facility]    In 1 month [address]    In 4 months UC ICD REMOTE [facility]    In 7 months DEVICE CHECK RN CARD [facility] PSA CLIN          Plan:   Patient will continue to follow treatment plan as directed and follow up with PCP and specialists with future concerns.  RN CC will make no further care coordination outreaches at this time.    Melissa Behl BSN, RN, PHN, CCM  Primary Care Clinical RN Care Coordinator  [facility]   [phone]

## 2019-10-03 ENCOUNTER — PATIENT OUTREACH (OUTPATIENT)
Dept: CARDIOLOGY | Facility: CLINIC | Age: 84
End: 2019-10-03

## 2019-10-03 ENCOUNTER — ALLIED HEALTH/NURSE VISIT (OUTPATIENT)
Dept: PHARMACY | Facility: CLINIC | Age: 84
End: 2019-10-03
Payer: COMMERCIAL

## 2019-10-03 ENCOUNTER — TELEPHONE (OUTPATIENT)
Dept: FAMILY MEDICINE | Facility: CLINIC | Age: 84
End: 2019-10-03

## 2019-10-03 DIAGNOSIS — E03.8 OTHER SPECIFIED HYPOTHYROIDISM: ICD-10-CM

## 2019-10-03 DIAGNOSIS — M81.0 AGE-RELATED OSTEOPOROSIS WITHOUT CURRENT PATHOLOGICAL FRACTURE: ICD-10-CM

## 2019-10-03 DIAGNOSIS — I50.33 ACUTE ON CHRONIC HEART FAILURE WITH PRESERVED EJECTION FRACTION (H): Primary | ICD-10-CM

## 2019-10-03 DIAGNOSIS — I10 HYPERTENSION GOAL BP (BLOOD PRESSURE) < 150/90: ICD-10-CM

## 2019-10-03 DIAGNOSIS — N39.0 RECURRENT UTI: ICD-10-CM

## 2019-10-03 DIAGNOSIS — M05.9 SEROPOSITIVE RHEUMATOID ARTHRITIS (H): ICD-10-CM

## 2019-10-03 DIAGNOSIS — K21.9 GASTROESOPHAGEAL REFLUX DISEASE WITHOUT ESOPHAGITIS: ICD-10-CM

## 2019-10-03 DIAGNOSIS — J30.2 SEASONAL ALLERGIC RHINITIS, UNSPECIFIED TRIGGER: ICD-10-CM

## 2019-10-03 DIAGNOSIS — E63.9 NUTRITIONAL DEFICIENCY: ICD-10-CM

## 2019-10-03 DIAGNOSIS — H35.30 MACULAR DEGENERATION (SENILE) OF RETINA: ICD-10-CM

## 2019-10-03 DIAGNOSIS — I48.0 PAROXYSMAL ATRIAL FIBRILLATION (H): ICD-10-CM

## 2019-10-03 PROCEDURE — 99607 MTMS BY PHARM ADDL 15 MIN: CPT | Performed by: PHARMACIST

## 2019-10-03 PROCEDURE — 99606 MTMS BY PHARM EST 15 MIN: CPT | Performed by: PHARMACIST

## 2019-10-03 RX ORDER — GLUCOSAMINE HCL 500 MG
1 TABLET ORAL DAILY
COMMUNITY
End: 2019-11-29

## 2019-10-03 RX ORDER — MULTIVIT WITH MINERALS/LUTEIN
250 TABLET ORAL DAILY
Status: ON HOLD | COMMUNITY
End: 2019-11-25

## 2019-10-03 RX ORDER — CHLORAL HYDRATE 500 MG
1 CAPSULE ORAL 2 TIMES DAILY
COMMUNITY

## 2019-10-03 NOTE — PROGRESS NOTES
I called Linda to follow up post hospitalization. No answer and asked for call back. Left voicemail for son Arjun as well to schedule follow up [facility] appointment. Asked him to call to schedule.   Lizett Zapata RN

## 2019-10-03 NOTE — PROGRESS NOTES
SUBJECTIVE/OBJECTIVE:                Linda Spicer is a 88 year old female called for a transitions of care visit.  She was discharged from [facility] on 10/1/19 for CHF exacerbation.     Chief Complaint: hospital follow-up.    Allergies/ADRs: Reviewed in Epic  Tobacco: No tobacco use   Alcohol: 1-3 beverages / week  Caffeine: no caffeine  Activity: much improved - glad she was able to go home instead of transitional care  PMH: Reviewed in Epic    Medication Adherence/Access:  Patient uses pill box(es). Fills two weeks at a time.   Patient takes medications 4 time(s) per day.   Per patient, misses medication 0 times per week.   Medication barriers: none.   The patient fills medications at [facility]: NO, fills medications at [facility]    HFpEF/AFib/HTN: Current medications include aspirin 81 mg daily, furosemide 40 mg every morning/20 mg every evening, potassium 20 mEq - one tablet twice daily, hydralazine 10 mg three times daily, isosorbide mononitrate 60 mg daily, Eliquis 2.5 mg twice daily. Has Nitrostat SL available PRN, but has not needed. Patient reports no current medication side effects.     ECHO:  Date 9/26/19, EF 60-65%  Pt is not complaining of sx of HF - does have some shortness of breath on exertion still.    Pt is measuring daily weights and reports decreased.   Patient does self-monitor BP. Home BP monitoring in range of 110-120's systolic over 70's diastolic.   Pt is following a low sodium diet, is not avoiding EtOH.    RA: Currently taking azathioprine 50 mg daily and Orencia IV. Pt finds this to be effective. Pt reports no current issues.     Recurrent UTI: Currently taking trimethoprim 100 mg daily, cranberry daily, and compounded Estriol cream twice weekly. Pt finds this to be effective. Pt reports no known issues.     Hypothyroidism: Patient is taking levothyroxine 75 mcg daily. Patient is having the following symptoms: none.   TSH   Date Value Ref Range Status   09/26/2019 1.13 0.40 - 4.00 mU/L  Final     Osteoporosis: Current therapy includes: calcium/vitamin D daily and Prolia every 6 months. Pt is not experiencing side effects.  Pt is getting the following sources of dietary calcium: milk  Last vitamin D level: n/a  DEXA History: T-score: -2.7 (6/26/17)  Risk factors: post-menopausal    Allergic rhinitis: Current medications include cetirizine 10 mg once daily. Pt feels that current therapy is effective.     GERD: Current medications include: no medications. Is holding ranitidine due to current recall concerns. Pt c/o no current symptoms.  Patient feels that current regimen is effective.    Macular Degeneration/Supplements: Pt is taking eye vitamins twice daily, fish oil 1 gram twice daily, and receiving regular eye injections.  Does take vitamin C (low dose) daily. Denies any current issues.    Today's Vitals: LMP  (LMP Unknown)  - telephone encounter, no vitals    Creatinine   Date Value Ref Range Status   10/01/2019 1.62 (H) 0.52 - 1.04 mg/dL Final     ASSESSMENT:                 Current medications were reviewed today.      Medication Adherence: good, no issues identified    HFpEF/AFib/HTN: Improved per patient.     RA: Stable.     Recurrent UTI: Stable.    Hypothyroidism: Stable.     Osteoporosis: Stable.     Allergic rhinitis: Stable.     GERD: Stable. If not needing ranitidine could consider PRN or discontinuation.     Macular Degeneration/Supplements: Stable.     PLAN:                  Post Discharge Medication Reconciliation Status: discharge medications reconciled, continue medications without change.    1. Continue current therapy.     I spent 45 minutes with this patient today. A copy of the visit note was provided to the patient's primary care provider.    Will follow up in 2-4 weeks as needed.    The patient was sent via [employer] a summary of these recommendations as an after visit summary.    Chucky Steven, PharmD, BCACP  Medication Therapy Management Pharmacist  Pager: [phone]

## 2019-10-03 NOTE — TELEPHONE ENCOUNTER
Delay of care needs provider's approval.     Routing to provider to review and advise.   Jacquelyn Schulz RN

## 2019-10-03 NOTE — TELEPHONE ENCOUNTER
Reason for Call:  Home Health Care    Delay start of care per patient request scheduled for Oct 4 services to include skilled nursing , PT.    Pt Provider: Vocal    Phone Number Homecare Nurse can be reached at: [facility] [phone]    Can we leave a detailed message on this number? YES    Best Time: any    Call taken on 10/3/2019 at 2:46 PM by Maribel Crockett

## 2019-10-04 ENCOUNTER — PATIENT OUTREACH (OUTPATIENT)
Dept: CARDIOLOGY | Facility: CLINIC | Age: 84
End: 2019-10-04

## 2019-10-04 ENCOUNTER — TELEPHONE (OUTPATIENT)
Dept: FAMILY MEDICINE | Facility: CLINIC | Age: 84
End: 2019-10-04

## 2019-10-04 DIAGNOSIS — I50.813 ACUTE ON CHRONIC RIGHT-SIDED CONGESTIVE HEART FAILURE (H): Primary | ICD-10-CM

## 2019-10-04 NOTE — TELEPHONE ENCOUNTER
Called and informed Rn. Rn requested advance directives to be faxed to [phone] attn Medical records.     Alisa Rosenberg MA

## 2019-10-04 NOTE — TELEPHONE ENCOUNTER
Reason for Call:  Other Home Care    Detailed comments: Pt's RN calling for she would like a call back as soon as possible today  to discuss further regarding Pt's Start Of Care orders .     Phone Number [facility] can be reached at: Other phone number:  [phone]    Best Time: anytime    Can we leave a detailed message on this number? YES    Call taken on 10/4/2019 at 1:56 PM by Siva Swanson

## 2019-10-04 NOTE — PROGRESS NOTES
I called Linda to follow up post hospitalization. She reports she is feeling well. Able to walk to dining [place] for breakfast this morning but was a little winded when she got back. She reports weight has been stable around 153-154 lbs since she left the hospital. We scheduled a follow up in [facility] for next week with labs. She will call with worsening symptoms of weight gain, SOB or swelling in legs.   Linda verbalized understanding.   Lizett Zapata RN

## 2019-10-04 NOTE — TELEPHONE ENCOUNTER
Spoke with Marisabel at [facility]. She is requesting orders for:    Nursing 2 times a week for 2 weeks then once weekly for 4 weeks.    Home health aid once weekly for 5 weeks.     PT orders.     Referral for dietitian and LSW.    Plus needs provider to address the medication interaction between Potassium and Trimethoprim.    Sena Lloyd RN, [facility] Triage

## 2019-10-07 ENCOUNTER — ALLIED HEALTH/NURSE VISIT (OUTPATIENT)
Dept: NURSING | Facility: CLINIC | Age: 84
End: 2019-10-07
Payer: MEDICARE

## 2019-10-07 ENCOUNTER — TELEPHONE (OUTPATIENT)
Dept: FAMILY MEDICINE | Facility: CLINIC | Age: 84
End: 2019-10-07

## 2019-10-07 DIAGNOSIS — Z87.898 HISTORY OF URINARY RETENTION: Primary | ICD-10-CM

## 2019-10-07 PROCEDURE — 51798 US URINE CAPACITY MEASURE: CPT

## 2019-10-07 NOTE — NURSING NOTE
Patient voided and post residual bladder scan was 74mL.    Patient aware that Dr. Martins will be updated on the above information. Patient will call with any questions or concerns.    Sena Chapman RN, BSN

## 2019-10-07 NOTE — TELEPHONE ENCOUNTER
Situation: Patient requesting PCP to advise what to take for loose stools.    Background: Patient states she had loose bowels yesterday X2 and then took Flagyl 500 mg X1 (a prescription from 2016), which she states relieved the loose stools.     Assessment:  Patient didn't take her temperature, but did report some chills.  Patient reported upset stomach, but denied nausea or vomiting.  Patient reports 1 small formed bowel movement this morning.  Last dose of Senna-S was 10/1/19.  Patient reports no abdominal discomfort today.    Recommendation/Plan: RN CC advised patient not to take medication without advice from a medical professional.  Patient verbalized understanding and states her family has placed this medication (Flagyl) in a bag to discard.  Patient inquires what Dr. Lockett would advise for her to take when she experiences loose bowels due to her multiple complex medical diagnoses.    Please advise.    Melissa Behl BSN, RN, PHN, CCM  Primary Care Clinical RN Care Coordinator  [facility]   [phone]

## 2019-10-07 NOTE — TELEPHONE ENCOUNTER
RN CC informed patient of Dr. Lockett's response below.  Patient verbalized understanding.    Melissa Behl BSN, RN, PHN, CCM  Primary Care Clinical RN Care Coordinator  [facility]   [phone]

## 2019-10-07 NOTE — TELEPHONE ENCOUNTER
Taking Flagyl sounds reasonable to take diarrhea when occurs.    First step is to try over-the-counter Pepto-Bismol. If not effective, then patient requires stool tests, then take Flagyl as needed until a diagnosis has been established.

## 2019-10-07 NOTE — TELEPHONE ENCOUNTER
Reason for Call:  Other call back    Detailed comments: Jorge Luis from Home care called in and wanted message re forwarded over to team as they are out of compliance as of today. Thank you.    Phone Number Patient can be reached at: Other phone number:  [phone]    Best Time: Any    Can we leave a detailed message on this number? YES    Call taken on 10/7/2019 at 9:13 AM by Katie Nunez

## 2019-10-07 NOTE — NURSING NOTE
"Linda Spicer comes into clinic today at the request of Dr. Martins Ordering Provider for [facility].    Patient voided a small amount of yellow urine. Patient stated, \"This happens to me when I am at the doctors office. It feels like I can go and then it just stops.\" Patient went to lobby and will update  staff when ready to have bladder scanned.    This service provided today was under the supervising provider of the day Dr. Velasquez, who was available if needed.    Sena Chapman RN    "

## 2019-10-08 ENCOUNTER — TELEPHONE (OUTPATIENT)
Dept: FAMILY MEDICINE | Facility: CLINIC | Age: 84
End: 2019-10-08

## 2019-10-08 NOTE — TELEPHONE ENCOUNTER
This writer attempted to contact Linda on 10/08/19      Reason for call orders and left message.      If patient calls back:   Registered Nurse called. Follow Triage Call workflow        Regla Escalona RN       VM did not indicate that it was confidential so did not leave patient identifiers on line.

## 2019-10-08 NOTE — TELEPHONE ENCOUNTER
Verbal orders    PT    2x / 4w- strength balance gait activity for COPD      Ok to detail message [phone]

## 2019-10-09 NOTE — TELEPHONE ENCOUNTER
This writer attempted to contact [facility]  on 10/09/19      Reason for call orders and left message.      If patient calls back:   Registered Nurse called. Follow Triage Call workflow        Regla Escalona RN

## 2019-10-09 NOTE — TELEPHONE ENCOUNTER
Reason for Call:  Other orders    Detailed comments: Karol calling from [employer], she is asking for verbal orders to start of the 10/4/2019.    Phone Number Patient can be reached at: Other phone number:  [phone]    Best Time: any    Can we leave a detailed message on this number? YES    Call taken on 10/9/2019 at 9:46 AM by Marilia Wright

## 2019-10-09 NOTE — TELEPHONE ENCOUNTER
Spoke with Ellen      Verbal orders given to approve the requested services below per the 'Home Care, Assisted Living, or Nursing Home Evaluations and Treatments [facility] Policy'.        Verbal orders     PT     2x / 4w- strength balance gait activity for COPD      Regla Escalona RN

## 2019-10-10 ENCOUNTER — OFFICE VISIT (OUTPATIENT)
Dept: CARDIOLOGY | Facility: CLINIC | Age: 84
End: 2019-10-10
Payer: MEDICARE

## 2019-10-10 ENCOUNTER — OFFICE VISIT (OUTPATIENT)
Dept: FAMILY MEDICINE | Facility: CLINIC | Age: 84
End: 2019-10-10
Payer: MEDICARE

## 2019-10-10 VITALS
WEIGHT: 157 LBS | TEMPERATURE: 98.3 F | SYSTOLIC BLOOD PRESSURE: 124 MMHG | BODY MASS INDEX: 26.8 KG/M2 | HEART RATE: 83 BPM | OXYGEN SATURATION: 97 % | DIASTOLIC BLOOD PRESSURE: 76 MMHG | HEIGHT: 64 IN | RESPIRATION RATE: 18 BRPM

## 2019-10-10 VITALS
HEART RATE: 81 BPM | DIASTOLIC BLOOD PRESSURE: 80 MMHG | SYSTOLIC BLOOD PRESSURE: 143 MMHG | BODY MASS INDEX: 26.78 KG/M2 | WEIGHT: 156 LBS | OXYGEN SATURATION: 100 %

## 2019-10-10 DIAGNOSIS — I50.32 CHRONIC HEART FAILURE WITH PRESERVED EJECTION FRACTION (H): ICD-10-CM

## 2019-10-10 DIAGNOSIS — I34.0 MITRAL VALVE INSUFFICIENCY, UNSPECIFIED ETIOLOGY: ICD-10-CM

## 2019-10-10 DIAGNOSIS — Z71.89 ADVANCED CARE PLANNING/COUNSELING DISCUSSION: ICD-10-CM

## 2019-10-10 DIAGNOSIS — J84.9 ILD (INTERSTITIAL LUNG DISEASE) (H): Primary | ICD-10-CM

## 2019-10-10 DIAGNOSIS — N18.30 CKD (CHRONIC KIDNEY DISEASE) STAGE 3, GFR 30-59 ML/MIN (H): Primary | ICD-10-CM

## 2019-10-10 DIAGNOSIS — Z87.440 PERSONAL HISTORY OF URINARY TRACT INFECTION: ICD-10-CM

## 2019-10-10 DIAGNOSIS — I48.91 ATRIAL FIBRILLATION, UNSPECIFIED TYPE (H): ICD-10-CM

## 2019-10-10 DIAGNOSIS — Z95.0 CARDIAC PACEMAKER IN SITU: ICD-10-CM

## 2019-10-10 DIAGNOSIS — I50.813 ACUTE ON CHRONIC RIGHT-SIDED CONGESTIVE HEART FAILURE (H): ICD-10-CM

## 2019-10-10 LAB
ALBUMIN UR-MCNC: NEGATIVE MG/DL
ANION GAP SERPL CALCULATED.3IONS-SCNC: 4 MMOL/L (ref 3–14)
APPEARANCE UR: CLEAR
BILIRUB UR QL STRIP: NEGATIVE
BUN SERPL-MCNC: 46 MG/DL (ref 7–30)
CALCIUM SERPL-MCNC: 8.8 MG/DL (ref 8.5–10.1)
CHLORIDE SERPL-SCNC: 105 MMOL/L (ref 94–109)
CO2 SERPL-SCNC: 30 MMOL/L (ref 20–32)
COLOR UR AUTO: YELLOW
CREAT SERPL-MCNC: 1.74 MG/DL (ref 0.52–1.04)
GFR SERPL CREATININE-BSD FRML MDRD: 26 ML/MIN/{1.73_M2}
GLUCOSE SERPL-MCNC: 149 MG/DL (ref 70–99)
GLUCOSE UR STRIP-MCNC: NEGATIVE MG/DL
HGB UR QL STRIP: NEGATIVE
HYALINE CASTS #/AREA URNS LPF: ABNORMAL /LPF
KETONES UR STRIP-MCNC: NEGATIVE MG/DL
LEUKOCYTE ESTERASE UR QL STRIP: ABNORMAL
NITRATE UR QL: NEGATIVE
NON-SQ EPI CELLS #/AREA URNS LPF: ABNORMAL /LPF
PH UR STRIP: 5.5 PH (ref 5–7)
POTASSIUM SERPL-SCNC: 4 MMOL/L (ref 3.4–5.3)
RBC #/AREA URNS AUTO: ABNORMAL /HPF
SODIUM SERPL-SCNC: 139 MMOL/L (ref 133–144)
SOURCE: ABNORMAL
SP GR UR STRIP: 1.01 (ref 1–1.03)
UROBILINOGEN UR STRIP-ACNC: 0.2 EU/DL (ref 0.2–1)
WBC #/AREA URNS AUTO: ABNORMAL /HPF

## 2019-10-10 PROCEDURE — 99214 OFFICE O/P EST MOD 30 MIN: CPT | Performed by: INTERNAL MEDICINE

## 2019-10-10 PROCEDURE — 36415 COLL VENOUS BLD VENIPUNCTURE: CPT | Performed by: NURSE PRACTITIONER

## 2019-10-10 PROCEDURE — 99215 OFFICE O/P EST HI 40 MIN: CPT | Performed by: NURSE PRACTITIONER

## 2019-10-10 PROCEDURE — 80048 BASIC METABOLIC PNL TOTAL CA: CPT | Performed by: NURSE PRACTITIONER

## 2019-10-10 PROCEDURE — 81001 URINALYSIS AUTO W/SCOPE: CPT | Performed by: INTERNAL MEDICINE

## 2019-10-10 ASSESSMENT — MIFFLIN-ST. JEOR: SCORE: 1127.15

## 2019-10-10 ASSESSMENT — PAIN SCALES - GENERAL: PAINLEVEL: NO PAIN (0)

## 2019-10-10 NOTE — PATIENT INSTRUCTIONS
At [facility], we strive to deliver an exceptional experience to you, every time we see you.  If you receive a survey in the mail, please send us back your thoughts. We really do value your feedback.    Based on your medical history, these are the current health maintenance/preventive care services that you are due for (some may have been done at this visit.)  Health Maintenance Due   Topic Date Due     ZOSTER IMMUNIZATION (1 of 2) 06[birth year]13/1981     MEDICARE ANNUAL WELLNESS VISIT  05/28/2015     MICROALBUMIN  07/26/2018     HF ACTION PLAN  06/21/2019         Suggested websites for health information:  Www.AppLovin.Thrupoint : Up to date and easily searchable information on multiple topics.  Www.Oximity.gov : medication info, interactive tutorials, watch real surgeries online  Www.familydoctor.org : good info from the Academy of Family Physicians  Www.cdc.gov : public health info, travel advisories, epidemics (H1N1)  Www.aap.org : children's health info, normal development, vaccinations  Www.health.[facility].[place].us : [place] dept of health, public health issues in [place], N1N1    Your care team:                            Family Medicine Internal Medicine   MD Tre Hicks MD Shantel Branch-Fleming, MD Katya Georgiev PA-C Nam Ho, MD Pediatrics   ABY Snyder, MD Maritza Tinsley CNP, MD Deborah Mielke, MD Kim Thein, APRN CNP      Clinic hours: Monday - Thursday 7 am-7 pm; Fridays 7 am-5 pm.   Urgent care: Monday - Friday 11 am-9 pm; Saturday and Sunday 9 am-5 pm.  Pharmacy : Monday -Thursday 8 am-8 pm; Friday 8 am-6 pm; Saturday and Sunday 9 am-5 pm.     Clinic: [phone]   Pharmacy: [phone]

## 2019-10-10 NOTE — PATIENT INSTRUCTIONS
Take your medicines every day, as directed    Changes made today:  o Increase Hydralazine to 20 mg three times daily.   o    Monitor Your Weight and Symptoms    Contact us if you:      Gain 2 pounds in one day or 5 pounds in one week    Feel more short of breath    Notice more leg swelling    Feel lightheadeded   Change your lifestyle    Limit Salt or Sodium:    2000 mg  Limit Fluids:    2000 mL or approximately 64 ounces  Eat a Heart Healthy Diet    Low in saturated fats  Stay Active:    Aim to move at least 150 minutes every  week         To Contact us    During Business Hours:  [phone], option # 1 (University)  Then option # 4 (medical questions)     After hours, weekends or holidays:   [phone], Option #4  Ask to speak to the On-Call Cardiologist. Inform them you are a CORE/heart failure patient at the [facility].     Use [employer] allows you to communicate directly with your heart team through secure messaging.    [employer] can be accessed any time on your phone, computer, or tablet.    If you need assistance, we'd be happy to help!         Keep your Heart Appointments:    1.  Call me with your decision about hospice/palliative  2.  I will call palliative care about medicine to help your breathing.  3.  Continue diuretics for now.

## 2019-10-10 NOTE — NURSING NOTE
"Chief Complaint   Patient presents with     Heart Failure     HFpEF and s/p Mitraclip presents for follow up with labs prior. Per patient sob.,fatigue,weak at times , and post Hosp. visit        Initial BP (!) 143/80 (BP Location: Right arm, Patient Position: Sitting, Cuff Size: Adult Regular)   Pulse 81   Wt 70.8 kg (156 lb)   LMP  (LMP Unknown)   SpO2 100%   BMI 26.78 kg/m   Estimated body mass index is 26.78 kg/m  as calculated from the following:    Height as of an earlier encounter on 10/10/19: 1.626 m (5' 4\").    Weight as of this encounter: 70.8 kg (156 lb)..  BP completed using cuff size: regular    Alexander Melendez L.P.N.    "

## 2019-10-10 NOTE — PROGRESS NOTES
Subjective     Linda Spicer is a 88 year old female who presents to clinic today for the following health issues:    HPI       Hospital Follow-up Visit:    Hospital/Nursing Home/IP Rehab Facility: [facility]  Date of Admission: 09/25/2019  Date of Discharge: 10/01/2019  Reason(s) for Admission: RUSSELL and CHF    -patient is still having SOB      -patient states that she would like to discuss possibly getting shingles vaccine         Problems taking medications regularly:  None       Medication changes since discharge: Aspirin, Calcium Carbonate, Carboxymethylcellulose  Cetrizine, doxycyline, multivitamin and Ranibizumab       Problems adhering to non-medication therapy:  None    Summary of hospitalization:  [facility] discharge summary reviewed  Diagnostic Tests/Treatments reviewed.  Follow up needed: Yes.  Other Healthcare Providers Involved in Patient s Care:         None  Update since discharge: fluctuating course.     Post Discharge Medication Reconciliation: discharge medications reconciled, continue medications without change.  Plan of care communicated with patient and family     Coding guidelines for this visit:  Type of Medical   Decision Making Face-to-Face Visit       within 7 Days of discharge Face-to-Face Visit        within 14 days of discharge   Moderate Complexity [id number]   High Complexity [id number]                Patient Active Problem List   Diagnosis     Hypertension goal BP (blood pressure) < 150/90     Hypothyroidism     Seropositive rheumatoid arthritis (H)     Macular degeneration, left eye     Irritable bowel syndrome     Encounter for palliative care     Adjustment disorder with anxious mood     Mild anemia     DDD (degenerative disc disease), lumbar     CKD (chronic kidney disease) stage 3, GFR 30-59 ml/min (H)     History of blood transfusion     Aftercare following surgery     S/P lumbar laminectomy     High risk medication use     Age-related  osteoporosis without current pathological fracture     Atrophic vaginitis     Fecal incontinence     Female stress incontinence     Impingement syndrome of both shoulders     UIP (usual interstitial pneumonitis) (H)     High risk medications (not anticoagulants) long-term use     Heart failure with preserved ejection fraction (H)     Congestive heart failure with preserved LV function, NYHA class 3 (H)     Other specified hypothyroidism     Rheumatoid arthritis involving multiple sites with positive rheumatoid factor (H)     Health Care Home     Sick sinus syndrome (H)     Cardiac pacemaker - Medtronic dual lead pacemaker - Not Dependent - MRI Safe     Immunosuppression (H)     ILD (interstitial lung disease) (H)     Heart failure with preserved ejection fraction, NYHA class I (H)     Atrial flutter (H)     Persistent atrial fibrillation     CHF exacerbation (H)     Pre-syncope     Mitral regurgitation     SOB (shortness of breath)     Mitral valve insufficiency, unspecified etiology     Abnormal findings on diagnostic imaging of cardiovascular system     Status post coronary angiogram     Dyspnea     RUSSELL (dyspnea on exertion)     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     Swelling of right lower extremity     Enterococcus UTI     Renal insufficiency     Urinary retention     Acute respiratory distress     Past Surgical History:   Procedure Laterality Date     ANESTHESIA CARDIOVERSION N/A 9/14/2018    Procedure: ANESTHESIA CARDIOVERSION;;  Surgeon: GENERIC ANESTHESIA PROVIDER;  Location: [facility] OR     ANESTHESIA CARDIOVERSION N/A 10/11/2018    Procedure: ANESTHESIA CARDIOVERSION;  Cardioversion;  Surgeon: GENERIC ANESTHESIA PROVIDER;  Location: [facility] OR     ANESTHESIA CARDIOVERSION N/A 10/16/2018    Procedure: Anesthesia Cardioversion ;  Surgeon: GENERIC ANESTHESIA PROVIDER;  Location: [facility] OR     APPENDECTOMY       BIOPSY      hemorrhoidectomy     CV HEART CATHETERIZATION WITH POSSIBLE INTERVENTION N/A 1/31/2019     Procedure: CORS,LHC,RHC-YOLANDA BEFORE CATH APPOINTMENT;  Surgeon: Avila Watson MD;  Location:  HEART CARDIAC CATH LAB     CV MITRACLIP N/A 2/11/2019    Procedure: Mitraclip Procedure;  Surgeon: Avila Watson MD;  Location:  OR     ENT SURGERY      tonsillectomy     GYN SURGERY      3 D & C's     HYSTERECTOMY, PAP NO LONGER INDICATED       LAMINECTOMY LUMBAR ONE LEVEL N/A 10/13/2015    Procedure: LAMINECTOMY LUMBAR ONE LEVEL;  Surgeon: Fransico Toussaint MD;  Location:  OR       Social History     Tobacco Use     Smoking status: Never Smoker     Smokeless tobacco: Never Used   Substance Use Topics     Alcohol use: Yes     Comment: rare wine      Family History   Problem Relation Age of Onset     Hypertension Mother      Psychotic Disorder Father      Diabetes Son      Diabetes Daughter      Blood Disease Daughter          Allergies   Allergen Reactions     Cephalexin Diarrhea and GI Disturbance     Other reaction(s): GI Upset       Cephalexin Hcl Diarrhea     Gabapentin Other (See Comments)     Dizzsiness  Shaky, dizzy, dry mouth  Dizzsiness  Shaky,dry mouth       Methotrexate Other (See Comments)     Depleted Folic Acid  And caused severe leg cramps  Severe leg cramps     Naproxen GI Disturbance, Other (See Comments) and Nausea     Constipation. Tolerates ibuprofen.       Perfume      Sulfa Drugs Shortness Of Breath     Throat swelling     Alprazolam Other (See Comments)     Dizziness      Levofloxacin GI Disturbance     Macrobid [Nitrofurantoin Anhydrous]      Possibly related to lung disease      Nitrofurantoin      Possibly related to lung disease      Seasonal Allergies      Ciprofloxacin Itching and Rash     Recent Labs   Lab Test 10/01/19  0606 09/30/19  1752 09/30/19  0641  09/26/19  0334 09/25/19  1020  09/05/19  1007  06/20/19  0919  02/11/19  1225  01/18/19  0447  08/09/18  0958  08/30/17  1214   LDL  --   --   --   --   --   --   --  120*  --   --   --   --   --   --   --  87  --  76    HDL  --   --   --   --   --   --   --  52  --   --   --   --   --   --   --  59  --  50   TRIG  --   --   --   --   --   --   --  120  --   --   --   --   --   --   --  51  --  101   ALT  --   --   --   --   --  29  --   --   --  43  --  16   < >  --    < >  --    < >  --    CR 1.62*  --  1.54*   < > 1.20* 1.48*   < > 1.58*   < > 1.71*   < > 1.71*   < > 1.55*   < > 1.38*   < >  --    GFRESTIMATED 28*  --  30*   < > 40* 31*   < > 29*   < > 26*   < > 26*   < > 30*   < > 36*   < >  --    GFRESTBLACK 32*  --  34*   < > 47* 36*   < > 33*   < > 30*   < > 31*   < > 34*   < > 44*   < >  --    POTASSIUM 4.1 3.9 3.5   < > 4.0 4.0   < > 4.7   < >  --    < > 3.8   < > 4.1   < > 5.5*   < >  --    TSH  --   --   --   --  1.13  --   --   --   --   --   --   --   --  0.73   < >  --    < > 0.77    < > = values in this interval not displayed.      BP Readings from Last 3 Encounters:   10/10/19 124/76   10/01/19 119/49   09/23/19 114/73    Wt Readings from Last 3 Encounters:   10/10/19 71.2 kg (157 lb)   09/25/19 75.5 kg (166 lb 6.4 oz)   09/23/19 73.5 kg (162 lb)                      Reviewed and updated as needed this visit by Provider         Review of Systems   ROS COMP: CONSTITUTIONAL: NEGATIVE for fever, chills, change in weight  INTEGUMENTARY/SKIN: NEGATIVE for worrisome rashes, moles or lesions  EYES: NEGATIVE for vision changes or irritation  ENT/MOUTH: NEGATIVE for ear, mouth and throat problems  RESP:POSITIVE for cough-non productive, dyspnea on exertion and SOB/dyspnea and NEGATIVE for hemoptysis and pleurisy  BREAST: NEGATIVE for masses, tenderness or discharge  CV: NEGATIVE for chest pain, palpitations or peripheral edema  GI: NEGATIVE for nausea, abdominal pain, heartburn, or change in bowel habits   female: POSITIVE for history of UTI.  MUSCULOSKELETAL: NEGATIVE for significant arthralgias or myalgia  NEURO: NEGATIVE for weakness, dizziness or paresthesias  ENDOCRINE: NEGATIVE for temperature intolerance, skin/hair  "changes  HEME: NEGATIVE for bleeding problems  PSYCHIATRIC: NEGATIVE for changes in mood or affect      Objective    /76 (BP Location: Right arm, Patient Position: Chair, Cuff Size: Adult Regular)   Pulse 83   Temp 98.3  F (36.8  C) (Oral)   Resp 18   Ht 1.626 m (5' 4\")   Wt 71.2 kg (157 lb)   LMP  (LMP Unknown)   SpO2 97%   BMI 26.95 kg/m     Physical Exam   GENERAL: healthy, alert and no distress  EYES: Eyes grossly normal to inspection, PERRL and conjunctivae and sclerae normal  HENT: ear canals and TM's normal, nose and mouth without ulcers or lesions  NECK: no adenopathy, no asymmetry, masses, or scars and thyroid normal to palpation  RESP: lungs clear to auscultation - no rales, rhonchi or wheezes  CV: regular rate and rhythm, normal S1 S2, no S3 or S4, no murmur, click or rub, no peripheral edema and peripheral pulses strong  ABDOMEN: soft, nontender, no hepatosplenomegaly, no masses and bowel sounds normal  MS: no gross musculoskeletal defects noted, no edema  SKIN: no suspicious lesions or rashes  NEURO: Normal strength and tone, mentation intact and speech normal  PSYCH: mentation appears normal, affect normal/bright    Diagnostic Test Results:  Results for orders placed or performed in visit on 10/10/19   Basic metabolic panel   Result Value Ref Range    Sodium 139 133 - 144 mmol/L    Potassium 4.0 3.4 - 5.3 mmol/L    Chloride 105 94 - 109 mmol/L    Carbon Dioxide 30 20 - 32 mmol/L    Anion Gap 4 3 - 14 mmol/L    Glucose 149 (H) 70 - 99 mg/dL    Urea Nitrogen 46 (H) 7 - 30 mg/dL    Creatinine 1.74 (H) 0.52 - 1.04 mg/dL    GFR Estimate 26 (L) >60 mL/min/[1.73_m2]    GFR Estimate If Black 30 (L) >60 mL/min/[1.73_m2]    Calcium 8.8 8.5 - 10.1 mg/dL     *Note: Due to a large number of results and/or encounters for the requested time period, some results have not been displayed. A complete set of results can be found in Results Review.           Assessment & Plan     1. ILD (interstitial lung " "disease) (H)    - PULMONARY MEDICINE REFERRAL; Future    2. Personal history of urinary tract infection    - UA reflex to Microscopic and Culture; Future     BMI:   Estimated body mass index is 26.95 kg/m  as calculated from the following:    Height as of this encounter: 1.626 m (5' 4\").    Weight as of this encounter: 71.2 kg (157 lb).   Weight management plan: dietary changes.        FUTURE APPOINTMENTS:       - Follow-up visit in 4 weeks.      Tre Lockett MD  [facility]        "

## 2019-10-10 NOTE — PROGRESS NOTES
HPI  Linda Spicer is a 88 year old female with a past medical history of CKD, HTN, anemia, pulmonary fibrosis, tachy/bradycardia syndrome s/p permanent pacemaker placement, atrial flutter, PAF, mild to moderate MR s/p mitral clip placement, and HFpEF. She was last seen in [facility] on 9/23/19 when she appeared hypervolemic. I increased her Lasix to 40 mg in the am and 20 mg in the afternoon. She returns for a post hospitalization follow up and is accompanied by her daughter today.    Linda was hospitalized at [facility] from 9/25 through 10/1/2019 for shortness of breath and increased fatigue.  An extensive work-up was done during her hospitalization.  She has been afebrile, and her O2 sats were normal on room air.  No leukocytosis or concerns for infection.  Blood cultures were negative.  VBG was within normal limits.  CRP was negative.  She had a CT/PE run done on 9/26 without any evidence of pulmonary embolism.  Her interstitial lung disease was stable.  Bilateral lower extremity ultrasounds performed on 9/26 were also negative for DVT.  All of her imaging tests done did not indicate any pulmonary edema or worsening heart failure.      She was trialed on BiPAP at bedtime during her hospitalization, but was not able to tolerate it.  Palliative care was consulted to discuss goals of care. She requested to change to DNR/DNI.    Since discharge, Linda has not been feeling well.  She continues to feel very short of breath and weak.  She feels terrible and states that she feels like she is actively dying.  She has been very short of breath at rest.  Her breathing worsens with any conversation or minimal movement.  She has no energy and feels fatigued.  She has not been participated in any social activities due to feeling unwell.  Her weight has been stable.  She denies any worsening lower extremity edema, PND, or orthopnea.  She has had no episode of chest pain or palpitations.  She denies lightheadedness or near  syncopal episodes.  Her appetite has been poor.  She has been staying within her sodium and fluid restrictions.     PMH  Past Medical History:   Diagnosis Date     Adjustment disorder with anxious mood 5/18/2015     Advanced directives, counseling/discussion 8/30/2012    Patient states has Advance Directive and will bring in a copy to clinic. 8/30/2012   [facility] Medical Assistant \       Anemia 9/25/2015     Basal cell cancer      CHF (congestive heart failure) (H) 9/18/2014     CKD (chronic kidney disease) stage 3, GFR 30-59 ml/min (H) 9/29/2015     DDD (degenerative disc disease), lumbar 9/25/2015     Diffuse idiopathic pulmonary fibrosis (H) 5/6/2013     Encounter for palliative care 5/18/2015     History of blood transfusion 9/29/2015     Hypertension goal BP (blood pressure) < 140/90 9/7/2012     Hypothyroid 9/7/2012     Irritable bowel syndrome 10/29/2013     Macular degeneration      Macular degeneration, 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     Nondisplaced spiral fracture of shaft of humerus      Osteoporosis 8/13/2013     Imo Update utility     RA (rheumatoid arthritis) (H) 5/7/2013     Rheumatic fever      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 2/20/2019     Shingles      Spinal stenosis of lumbar region with neurogenic claudication 9/14/2015       Past Surgical History:   Procedure Laterality Date     ANESTHESIA CARDIOVERSION N/A 9/14/2018    Procedure: ANESTHESIA CARDIOVERSION;;  Surgeon: GENERIC ANESTHESIA PROVIDER;  Location: [facility] OR     ANESTHESIA CARDIOVERSION N/A 10/11/2018    Procedure: ANESTHESIA CARDIOVERSION;  Cardioversion;  Surgeon: GENERIC ANESTHESIA PROVIDER;  Location: [facility] OR     ANESTHESIA CARDIOVERSION N/A 10/16/2018    Procedure: Anesthesia Cardioversion ;  Surgeon: GENERIC ANESTHESIA PROVIDER;  Location: [facility] OR     APPENDECTOMY       BIOPSY      hemorrhoidectomy     CV HEART CATHETERIZATION WITH POSSIBLE INTERVENTION N/A 1/31/2019    Procedure: CORS,LHC,RHC-YOLANDA BEFORE CATH APPOINTMENT;   Surgeon: Avila Watson MD;  Location:  HEART CARDIAC CATH LAB     CV MITRACLIP N/A 2/11/2019    Procedure: Mitraclip Procedure;  Surgeon: Avila Watson MD;  Location:  OR     ENT SURGERY      tonsillectomy     GYN SURGERY      3 D & C's     HYSTERECTOMY, PAP NO LONGER INDICATED       LAMINECTOMY LUMBAR ONE LEVEL N/A 10/13/2015    Procedure: LAMINECTOMY LUMBAR ONE LEVEL;  Surgeon: Fransico Toussaint MD;  Location:  OR       Family History   Problem Relation Age of Onset     Hypertension Mother      Psychotic Disorder Father      Diabetes Son      Diabetes Daughter      Blood Disease Daughter        Social History     Socioeconomic History     Marital status:      Spouse name: None     Number of children: None     Years of education: None     Highest education level: None   Occupational History     None   Social Needs     Financial resource strain: None     Food insecurity:     Worry: None     Inability: None     Transportation needs:     Medical: None     Non-medical: None   Tobacco Use     Smoking status: Never Smoker     Smokeless tobacco: Never Used   Substance and Sexual Activity     Alcohol use: Yes     Comment: rare wine      Drug use: No     Sexual activity: Never   Lifestyle     Physical activity:     Days per week: 0 days     Minutes per session: 0 min     Stress: None   Relationships     Social connections:     Talks on phone: None     Gets together: None     Attends Zoroastrianism service: None     Active member of club or organization: None     Attends meetings of clubs or organizations: None     Relationship status: None     Intimate partner violence:     Fear of current or ex partner: None     Emotionally abused: None     Physically abused: None     Forced sexual activity: None   Other Topics Concern     Parent/sibling w/ CABG, MI or angioplasty before 65F 55M? No   Social History Narrative    . Has six children. She enjoys bridge and genealogy.  passed away.  Her  son has financial and alcohol issues.       ALLERGIES  Allergies   Allergen Reactions     Cephalexin Diarrhea and GI Disturbance     Other reaction(s): GI Upset       Cephalexin Hcl Diarrhea     Gabapentin Other (See Comments)     Dizzsiness  Shaky, dizzy, dry mouth  Dizzsiness  Shaky,dry mouth       Methotrexate Other (See Comments)     Depleted Folic Acid  And caused severe leg cramps  Severe leg cramps     Naproxen GI Disturbance, Other (See Comments) and Nausea     Constipation. Tolerates ibuprofen.       Perfume      Sulfa Drugs Shortness Of Breath     Throat swelling     Alprazolam Other (See Comments)     Dizziness      Levofloxacin GI Disturbance     Macrobid [Nitrofurantoin Anhydrous]      Possibly related to lung disease      Nitrofurantoin      Possibly related to lung disease      Seasonal Allergies      Ciprofloxacin Itching and Rash       MEDICATIONS apixaban ANTICOAGULANT (ELIQUIS ANTICOAGULANT) 2.5 MG tablet, Take 1 tablet (2.5 mg) by mouth 2 times daily  azaTHIOprine (IMURAN) 50 MG tablet, Take 1 tablet (50 mg) by mouth daily  COMPOUNDED NON-CONTROLLED SUBSTANCE (CMPD RX) - PHARMACY TO MIX COMPOUNDED MEDICATION, Estriol 1mg/gram. Place 1 gram vaginally daily for 2 weeks. Then vaginally twice weekly  Cranberry 400 MG TABS, Take 1 tablet by mouth daily  denosumab (PROLIA) 60 MG/ML SOLN injection, Inject 1 mL (60 mg) Subcutaneous every 6 months  fish oil-omega-3 fatty acids 1000 MG capsule, Take 1 g by mouth 2 times daily  furosemide (LASIX) 20 MG tablet, Take 40 mg in the am and 20 mg in the afternoon.  hydrALAZINE (APRESOLINE) 10 MG tablet, Take 1 tablet (10 mg) by mouth 3 times daily  isosorbide mononitrate (IMDUR) 60 MG 24 hr tablet, Take 1 tablet (60 mg) by mouth daily  levothyroxine (SYNTHROID/LEVOTHROID) 75 MCG tablet, TAKE 1 TABLET BY MOUTH EVERY DAY  Multiple Vitamins-Minerals (PRESERVISION AREDS) CAPS, Take 1 capsule by mouth 2 times daily  order for DME, Dispense SP Nam-small  order for  DME, Equipment being ordered: Dispense baffle, for use with nebulizer.  order for DME, Equipment being ordered: Nebulizer. Use with Albuterol.  order for DME, Equipment being ordered: Dispense face mask.  Mrs. Spicer is immunosuppressed due to rheumatoid arthritis.  potassium chloride ER (K-DUR/KLOR-CON M) 20 MEQ CR tablet, Take 2 tablets (40 mEq) by mouth daily  ranitidine (ZANTAC) 150 MG tablet, Take 150 mg by mouth daily  senna-docusate (SENOKOT-S/PERICOLACE) 8.6-50 MG tablet, Take 1 tablet by mouth daily as needed for constipation  trimethoprim (TRIMPEX) 100 MG tablet, Take 0.5 tablets (50 mg) by mouth daily  vitamin C (ASCORBIC ACID) 250 MG tablet, Take 250 mg by mouth daily  carvedilol (COREG) 3.125 MG tablet, Take 1 tablet (3.125 mg) by mouth 2 times daily (with meals)  nitroGLYcerin (NITROSTAT) 0.4 MG sublingual tablet, Place 1 tablet (0.4 mg) under the tongue every 5 minutes as needed for chest pain    No current facility-administered medications on file prior to visit.       ROS:  Constitutional: Denies fever, chills, or diaphoresis. Weight stable.  Respiratory:  See HPI.     Cardiovascular: As per HPI.   GI: Denies nausea, vomiting, diarrhea, hematemesis, melena, or hematochezia.   : See HPI   Integument: Negative for bruising, rash, or pruritis.  Psychiatric: +anxiety and depression, controlled  Neuro: Negative for headaches, syncope, numbness or tingling.   Endocrinology:  +Hypothyroidism  Musculoskeletal:  +gait instability and muscle weakness    EXAM  BP (!) 143/80 (BP Location: Right arm, Patient Position: Sitting, Cuff Size: Adult Regular)   Pulse 81   Wt 70.8 kg (156 lb)   LMP  (LMP Unknown)   SpO2 100%   BMI 26.78 kg/m    Home weight:  156 lbs  Vitals:    10/10/19 1450   Weight: 70.8 kg (156 lb)     General: Appears pale, unwell, and fatigued.  Head: normocephalic, atraumatic  Eyes: anicteric sclera, EOMI  Neck: Neck supple. No masses  Orophyarynx: moist mucosa, no cyanosis  Heart: regular,  S1/S2, no murmur, gallop, rub, estimated JVP not appreciated.  Lungs: Respirations labored at rest, Rales heard throughout bilaterally. No rhonchi or wheezing  Abdomen: soft, non-tender, bowel sounds present, no hepatomegaly  Extremities: no clubbing, cyanosis or edema  Neurological: normal speech and affect, no gross motor deficits  Skin:  Normal skin turgor.  Warm.  No ecchymosis or lesions.    LABS  Last Comprehensive Metabolic Panel:  Sodium   Date Value Ref Range Status   10/10/2019 139 133 - 144 mmol/L Final     Potassium   Date Value Ref Range Status   10/10/2019 4.0 3.4 - 5.3 mmol/L Final     Chloride   Date Value Ref Range Status   10/10/2019 105 94 - 109 mmol/L Final     Carbon Dioxide   Date Value Ref Range Status   10/10/2019 30 20 - 32 mmol/L Final     Anion Gap   Date Value Ref Range Status   10/10/2019 4 3 - 14 mmol/L Final     Glucose   Date Value Ref Range Status   10/10/2019 149 (H) 70 - 99 mg/dL Final     Comment:     Non Fasting     Urea Nitrogen   Date Value Ref Range Status   10/10/2019 46 (H) 7 - 30 mg/dL Final     Creatinine   Date Value Ref Range Status   10/10/2019 1.74 (H) 0.52 - 1.04 mg/dL Final     GFR Estimate   Date Value Ref Range Status   10/10/2019 26 (L) >60 mL/min/[1.73_m2] Final     Comment:     Non  GFR Calc  Starting 12/18/2018, serum creatinine based estimated GFR (eGFR) will be   calculated using the Chronic Kidney Disease Epidemiology Collaboration   (CKD-EPI) equation.       Calcium   Date Value Ref Range Status   10/10/2019 8.8 8.5 - 10.1 mg/dL Final       MOST RECENT ECHOCARDIOGRAM 9/25/19:  Interpretation Summary  Global and regional left ventricular function is normal with an EF of 60-65%.  Right ventricular function, chamber size, wall motion, and thickness are  normal.  S/P Mitral clip.Mean mitral gradient 4.5mmHg at heartrate 70BPM. Trivial  residual MR.  The inferior vena cava is normal.  No pericardial effusion is  present.  ____________________________      ASSESSMENT AND PLAN  Ms. Linda Spicer is a 88 year old female with HFpEF who does not appear well.  She does not appear to be acutely decompensated from a heart failure perspective, and actually looks a little hypovolemic.  At this point in time I am unsure what is causing her acute exacerbation. We discussed decreasing her diuretics, but she declined at this time as she is going to discuss starting prednisone with her pulmonologist.     We did discuss goals of care today.  She did confirm that she wanted to change her CODE STATUS to DNR/DNI.  We had a long discussion about palliative care and hospice and goals of care with her and her daughter today.  At this time she would like to discuss with the pulmonologist whether or not she would benefit from prednisone to help with her shortness of breath.  If her symptoms continue to worsen, and we are unable to determine the cause, she states that she would like to transition to hospice care.  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 I have not scheduled follow up with [facility] per her request, but will continue to follow her as needed via phone in the interim.    1. Chronic HFpEF (EF 60-65%  ). Risk factors include female gender, age, HTN, obesity and arrhythmia  The mainstays of therapy for HFpEF include volume management, blood pressure control, treating underlying sleep apnea if present, treatment of underlying CAD if within the goals of care, weight management, exercise training, rate control for atrial fibrillation as well as consideration for a rhythm control strategy, and consideration for clinical trials. Consideration of spironolactone in low risk patients should be taken given the positive CHF results in the TOPCAT trial.  Stage D. NYHA Class IV.  Primary Cardiologist: Dr. Loza; Last seen 3/13/19  BP: Suboptimal control. Hydralazine  10 mg three times daily. Declined med adjustment today.  Fluid status Hypovolemic  Aldosterone antagonist: no, deferred due to renal dysfunction  Ischemia evaluation: Complete Negative stress test 2014  ACEi/ARB/ARNI: n/a, no evidence for use in HFpEF  BB: n/a, no evidence for use in HFpEF   SCD prophylaxis: n/a, no evidence for use in HFpEF  NSAID use: Contraindicated  Sleep apnea evaluation: Refused    Remote PA Pressure Monitoring (CardioMems) Not in goals of care  Remote monitoring:  MyChart   Anticoagulation: Apixaban  Antiplatelet: None    2. Atrial Fibrillation:  Anticoagulation: Apixaban 2.5 mg twice daily    Rate control:  Coreg 3.125 mg twice daily.  ChadsVasc Score: 4  HR 81     3.  Tachy-Jadiel syndrome s/p PPM:  Followed by EP. Device checks per protocol.     4.  CKD Stage IV:  Creatinine 1.74 today. Baseline per record review is 1.4. Suspect due to poor intake and increased diuretic dose due to hypervolemia at last office visit.  She is considering starting prednisone per pulmonologist's recommendations and didn't want to make adjustments today.  Continue to monitor.  She will contact us next week with the plan and I will adjust diuretics accordingly.     5. Mitral regurgitation:  S/p Mitraclip.  Followed by Dr. Loza.  Follow up with him per previous recommendations.      6. Advanced Care planning:  Changed to DNR/DNI.  Is contemplating transitioning to hospice but wants to discuss with her family further and see what options she has left.  Discussed goals of care today.      7. Follow up: Dr. Loza in fall, unless she transitions to hospice.  [facility] as needed.       >40 minutes spent face to face with patient with >50% in counseling, education, and coordination of care      Karla MOELLER, CNP  [facility]

## 2019-10-10 NOTE — TELEPHONE ENCOUNTER
Contacted [email or web address] and they said they already received approval.     Closing encounter.   Jacquelyn Schulz RN

## 2019-10-10 NOTE — LETTER
10/10/2019      RE: Linda Spicer  [address]  [address]  [facility] [address]       Dear Colleague,    Thank you for the opportunity to participate in the care of your patient, Linda Spicer, at the [facility] HEART AT [facility] at [facility]. Please see a copy of my visit note below.    HPI  Linda Spicer is a 88 year old female with a past medical history of CKD, HTN, anemia, pulmonary fibrosis, tachy/bradycardia syndrome s/p permanent pacemaker placement, atrial flutter, PAF, mild to moderate MR s/p mitral clip placement, and HFpEF. She was last seen in [facility] on 9/23/19 when she appeared hypervolemic. I increased her Lasix to 40 mg in the am and 20 mg in the afternoon. She returns for a post hospitalization follow up and is accompanied by her daughter today.    Linda was hospitalized at [facility] from 9/25 through 10/1/2019 for shortness of breath and increased fatigue.  An extensive work-up was done during her hospitalization.  She has been afebrile, and her O2 sats were normal on room air.  No leukocytosis or concerns for infection.  Blood cultures were negative.  VBG was within normal limits.  CRP was negative.  She had a CT/PE run done on 9/26 without any evidence of pulmonary embolism.  Her interstitial lung disease was stable.  Bilateral lower extremity ultrasounds performed on 9/26 were also negative for DVT.  All of her imaging tests done did not indicate any pulmonary edema or worsening heart failure.      She was trialed on BiPAP at bedtime during her hospitalization, but was not able to tolerate it.  Palliative care was consulted to discuss goals of care. She requested to change to DNR/DNI.    Since discharge, Linda has not been feeling well.  She continues to feel very short of breath and weak.  She feels terrible and states that she feels like she is actively dying.  She has been very short of breath at rest.  Her  breathing worsens with any conversation or minimal movement.  She has no energy and feels fatigued.  She has not been participated in any social activities due to feeling unwell.  Her weight has been stable.  She denies any worsening lower extremity edema, PND, or orthopnea.  She has had no episode of chest pain or palpitations.  She denies lightheadedness or near syncopal episodes.  Her appetite has been poor.  She has been staying within her sodium and fluid restrictions.     PMH  Past Medical History:   Diagnosis Date     Adjustment disorder with anxious mood 5/18/2015     Advanced directives, counseling/discussion 8/30/2012    Patient states has Advance Directive and will bring in a copy to clinic. 8/30/2012   [facility] Medical Assistant \       Anemia 9/25/2015     Basal cell cancer      CHF (congestive heart failure) (H) 9/18/2014     CKD (chronic kidney disease) stage 3, GFR 30-59 ml/min (H) 9/29/2015     DDD (degenerative disc disease), lumbar 9/25/2015     Diffuse idiopathic pulmonary fibrosis (H) 5/6/2013     Encounter for palliative care 5/18/2015     History of blood transfusion 9/29/2015     Hypertension goal BP (blood pressure) < 140/90 9/7/2012     Hypothyroid 9/7/2012     Irritable bowel syndrome 10/29/2013     Macular degeneration      Macular degeneration, 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     Nondisplaced spiral fracture of shaft of humerus      Osteoporosis 8/13/2013     Imo Update utility     RA (rheumatoid arthritis) (H) 5/7/2013     Rheumatic fever      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 2/20/2019     Shingles      Spinal stenosis of lumbar region with neurogenic claudication 9/14/2015       Past Surgical History:   Procedure Laterality Date     ANESTHESIA CARDIOVERSION N/A 9/14/2018    Procedure: ANESTHESIA CARDIOVERSION;;  Surgeon: GENERIC ANESTHESIA PROVIDER;  Location: [facility] OR     ANESTHESIA CARDIOVERSION N/A 10/11/2018    Procedure: ANESTHESIA CARDIOVERSION;  Cardioversion;  Surgeon:  GENERIC ANESTHESIA PROVIDER;  Location: [facility] OR     ANESTHESIA CARDIOVERSION N/A 10/16/2018    Procedure: Anesthesia Cardioversion ;  Surgeon: GENERIC ANESTHESIA PROVIDER;  Location: [facility] OR     APPENDECTOMY       BIOPSY      hemorrhoidectomy     CV HEART CATHETERIZATION WITH POSSIBLE INTERVENTION N/A 1/31/2019    Procedure: CORS,LHC,RHC-YOLANDA BEFORE CATH APPOINTMENT;  Surgeon: Avila Watson MD;  Location:  HEART CARDIAC CATH LAB     CV MITRACLIP N/A 2/11/2019    Procedure: Mitraclip Procedure;  Surgeon: Avila Watson MD;  Location: [facility] OR     ENT SURGERY      tonsillectomy     GYN SURGERY      3 D & C's     HYSTERECTOMY, PAP NO LONGER INDICATED       LAMINECTOMY LUMBAR ONE LEVEL N/A 10/13/2015    Procedure: LAMINECTOMY LUMBAR ONE LEVEL;  Surgeon: Fransico Toussaint MD;  Location: [facility] OR       Family History   Problem Relation Age of Onset     Hypertension Mother      Psychotic Disorder Father      Diabetes Son      Diabetes Daughter      Blood Disease Daughter        Social History     Socioeconomic History     Marital status:      Spouse name: None     Number of children: None     Years of education: None     Highest education level: None   Occupational History     None   Social Needs     Financial resource strain: None     Food insecurity:     Worry: None     Inability: None     Transportation needs:     Medical: None     Non-medical: None   Tobacco Use     Smoking status: Never Smoker     Smokeless tobacco: Never Used   Substance and Sexual Activity     Alcohol use: Yes     Comment: rare wine      Drug use: No     Sexual activity: Never   Lifestyle     Physical activity:     Days per week: 0 days     Minutes per session: 0 min     Stress: None   Relationships     Social connections:     Talks on phone: None     Gets together: None     Attends Sikh service: None     Active member of club or organization: None     Attends meetings of clubs or organizations: None     Relationship status: None      Intimate partner violence:     Fear of current or ex partner: None     Emotionally abused: None     Physically abused: None     Forced sexual activity: None   Other Topics Concern     Parent/sibling w/ CABG, MI or angioplasty before 65F 55M? No   Social History Narrative    . Has six children. She enjoys [employer] and [employer].  passed away.  Her son has financial and alcohol issues.       ALLERGIES  Allergies   Allergen Reactions     Cephalexin Diarrhea and GI Disturbance     Other reaction(s): GI Upset       Cephalexin Hcl Diarrhea     Gabapentin Other (See Comments)     Dizzsiness  Shaky, dizzy, dry mouth  Dizzsiness  Shaky,dry mouth       Methotrexate Other (See Comments)     Depleted Folic Acid  And caused severe leg cramps  Severe leg cramps     Naproxen GI Disturbance, Other (See Comments) and Nausea     Constipation. Tolerates ibuprofen.       Perfume      Sulfa Drugs Shortness Of Breath     Throat swelling     Alprazolam Other (See Comments)     Dizziness      Levofloxacin GI Disturbance     Macrobid [Nitrofurantoin Anhydrous]      Possibly related to lung disease      Nitrofurantoin      Possibly related to lung disease      Seasonal Allergies      Ciprofloxacin Itching and Rash       MEDICATIONS apixaban ANTICOAGULANT (ELIQUIS ANTICOAGULANT) 2.5 MG tablet, Take 1 tablet (2.5 mg) by mouth 2 times daily  azaTHIOprine (IMURAN) 50 MG tablet, Take 1 tablet (50 mg) by mouth daily  COMPOUNDED NON-CONTROLLED SUBSTANCE (CMPD RX) - PHARMACY TO MIX COMPOUNDED MEDICATION, Estriol 1mg/gram. Place 1 gram vaginally daily for 2 weeks. Then vaginally twice weekly  Cranberry 400 MG TABS, Take 1 tablet by mouth daily  denosumab (PROLIA) 60 MG/ML SOLN injection, Inject 1 mL (60 mg) Subcutaneous every 6 months  fish oil-omega-3 fatty acids 1000 MG capsule, Take 1 g by mouth 2 times daily  furosemide (LASIX) 20 MG tablet, Take 40 mg in the am and 20 mg in the afternoon.  hydrALAZINE (APRESOLINE) 10 MG tablet,  Take 1 tablet (10 mg) by mouth 3 times daily  isosorbide mononitrate (IMDUR) 60 MG 24 hr tablet, Take 1 tablet (60 mg) by mouth daily  levothyroxine (SYNTHROID/LEVOTHROID) 75 MCG tablet, TAKE 1 TABLET BY MOUTH EVERY DAY  Multiple Vitamins-Minerals (PRESERVISION AREDS) CAPS, Take 1 capsule by mouth 2 times daily  order for DME, Dispense SP Walker-small  order for DME, Equipment being ordered: Dispense baffle, for use with nebulizer.  order for DME, Equipment being ordered: Nebulizer. Use with Albuterol.  order for DME, Equipment being ordered: Dispense face mask.  Mrs. Spicer is immunosuppressed due to rheumatoid arthritis.  potassium chloride ER (K-DUR/KLOR-CON M) 20 MEQ CR tablet, Take 2 tablets (40 mEq) by mouth daily  ranitidine (ZANTAC) 150 MG tablet, Take 150 mg by mouth daily  senna-docusate (SENOKOT-S/PERICOLACE) 8.6-50 MG tablet, Take 1 tablet by mouth daily as needed for constipation  trimethoprim (TRIMPEX) 100 MG tablet, Take 0.5 tablets (50 mg) by mouth daily  vitamin C (ASCORBIC ACID) 250 MG tablet, Take 250 mg by mouth daily  carvedilol (COREG) 3.125 MG tablet, Take 1 tablet (3.125 mg) by mouth 2 times daily (with meals)  nitroGLYcerin (NITROSTAT) 0.4 MG sublingual tablet, Place 1 tablet (0.4 mg) under the tongue every 5 minutes as needed for chest pain    No current facility-administered medications on file prior to visit.       ROS:  Constitutional: Denies fever, chills, or diaphoresis. Weight stable.  Respiratory:  See HPI.     Cardiovascular: As per HPI.   GI: Denies nausea, vomiting, diarrhea, hematemesis, melena, or hematochezia.   : See HPI   Integument: Negative for bruising, rash, or pruritis.  Psychiatric: +anxiety and depression, controlled  Neuro: Negative for headaches, syncope, numbness or tingling.   Endocrinology:  +Hypothyroidism  Musculoskeletal:  +gait instability and muscle weakness    EXAM  BP (!) 143/80 (BP Location: Right arm, Patient Position: Sitting, Cuff Size: Adult Regular)    Pulse 81   Wt 70.8 kg (156 lb)   LMP  (LMP Unknown)   SpO2 100%   BMI 26.78 kg/m     Home weight:  156 lbs  Vitals:    10/10/19 1450   Weight: 70.8 kg (156 lb)     General: Appears pale, unwell, and fatigued.  Head: normocephalic, atraumatic  Eyes: anicteric sclera, EOMI  Neck: Neck supple. No masses  Orophyarynx: moist mucosa, no cyanosis  Heart: regular, S1/S2, no murmur, gallop, rub, estimated JVP not appreciated.  Lungs: Respirations labored at rest, Rales heard throughout bilaterally. No rhonchi or wheezing  Abdomen: soft, non-tender, bowel sounds present, no hepatomegaly  Extremities: no clubbing, cyanosis or edema  Neurological: normal speech and affect, no gross motor deficits  Skin:  Normal skin turgor.  Warm.  No ecchymosis or lesions.    LABS  Last Comprehensive Metabolic Panel:  Sodium   Date Value Ref Range Status   10/10/2019 139 133 - 144 mmol/L Final     Potassium   Date Value Ref Range Status   10/10/2019 4.0 3.4 - 5.3 mmol/L Final     Chloride   Date Value Ref Range Status   10/10/2019 105 94 - 109 mmol/L Final     Carbon Dioxide   Date Value Ref Range Status   10/10/2019 30 20 - 32 mmol/L Final     Anion Gap   Date Value Ref Range Status   10/10/2019 4 3 - 14 mmol/L Final     Glucose   Date Value Ref Range Status   10/10/2019 149 (H) 70 - 99 mg/dL Final     Comment:     Non Fasting     Urea Nitrogen   Date Value Ref Range Status   10/10/2019 46 (H) 7 - 30 mg/dL Final     Creatinine   Date Value Ref Range Status   10/10/2019 1.74 (H) 0.52 - 1.04 mg/dL Final     GFR Estimate   Date Value Ref Range Status   10/10/2019 26 (L) >60 mL/min/[1.73_m2] Final     Comment:     Non  GFR Calc  Starting 12/18/2018, serum creatinine based estimated GFR (eGFR) will be   calculated using the Chronic Kidney Disease Epidemiology Collaboration   (CKD-EPI) equation.       Calcium   Date Value Ref Range Status   10/10/2019 8.8 8.5 - 10.1 mg/dL Final       MOST RECENT ECHOCARDIOGRAM  9/25/19:  Interpretation Summary  Global and regional left ventricular function is normal with an EF of 60-65%.  Right ventricular function, chamber size, wall motion, and thickness are  normal.  S/P Mitral clip.Mean mitral gradient 4.5mmHg at heartrate 70BPM. Trivial  residual MR.  The inferior vena cava is normal.  No pericardial effusion is present.  ____________________________      ASSESSMENT AND PLAN  Ms. Linda Spicer is a 88 year old female with HFpEF who does not appear well.  She does not appear to be acutely decompensated from a heart failure perspective, and actually looks a little hypovolemic.  At this point in time I am unsure what is causing her acute exacerbation. We discussed decreasing her diuretics, but she declined at this time as she is going to discuss starting prednisone with her pulmonologist.     We did discuss goals of care today.  She did confirm that she wanted to change her CODE STATUS to DNR/DNI.  We had a long discussion about palliative care and hospice and goals of care with her and her daughter today.  At this time she would like to discuss with the pulmonologist whether or not she would benefit from prednisone to help with her shortness of breath.  If her symptoms continue to worsen, and we are unable to determine the cause, she states that she would like to transition to hospice care.  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 I have not scheduled follow up with [facility] per her request, but will continue to follow her as needed via phone in the interim.    1. Chronic HFpEF (EF 60-65%  ). Risk factors include female gender, age, HTN, obesity and arrhythmia  The mainstays of therapy for HFpEF include volume management, blood pressure control, treating underlying sleep apnea if present, treatment of underlying CAD if within the goals of care, weight management, exercise training, rate control for atrial  fibrillation as well as consideration for a rhythm control strategy, and consideration for clinical trials. Consideration of spironolactone in low risk patients should be taken given the positive CHF results in the TOPCAT trial.  Stage D. NYHA Class IV.  Primary Cardiologist: Dr. Loza; Last seen 3/13/19  BP: Suboptimal control. Hydralazine 10 mg three times daily. Declined med adjustment today.  Fluid status Hypovolemic  Aldosterone antagonist: no, deferred due to renal dysfunction  Ischemia evaluation: Complete Negative stress test 2014  ACEi/ARB/ARNI: n/a, no evidence for use in HFpEF  BB: n/a, no evidence for use in HFpEF   SCD prophylaxis: n/a, no evidence for use in HFpEF  NSAID use: Contraindicated  Sleep apnea evaluation: Refused    Remote PA Pressure Monitoring (CardioMems) Not in goals of care  Remote monitoring:  MyChart   Anticoagulation: Apixaban  Antiplatelet: None    2. Atrial Fibrillation:  Anticoagulation: Apixaban 2.5 mg twice daily    Rate control:  Coreg 3.125 mg twice daily.  ChadsVasc Score: 4  HR  81     3.  Tachy-Jadiel syndrome s/p PPM:  Followed by EP. Device checks per protocol.     4.  CKD Stage IV:  Creatinine 1.74 today. Baseline per record review is 1.4. Suspect due to poor intake and increased diuretic dose due to hypervolemia at last office visit.  She is considering starting prednisone per pulmonologist's recommendations and didn't want to make adjustments today.  Continue to monitor.  She will contact us next week with the plan and I will adjust diuretics accordingly.     5. Mitral regurgitation:  S/p Mitraclip.  Followed by Dr. Loza.  Follow up with him per previous recommendations.      6. Advanced Care planning:  Changed to DNR/DNI.  Is contemplating transitioning to hospice but wants to discuss with her family further and see what options she has left.  Discussed goals of care today.      7. Follow up: Dr. Loza in fall, unless she transitions to hospice.  [facility] as needed.        >40 minutes spent face to face with patient with >50% in counseling, education, and coordination of care      Karla MOELLER, KAVEH  [facility]

## 2019-10-11 ENCOUNTER — TELEPHONE (OUTPATIENT)
Dept: CARDIOLOGY | Facility: CLINIC | Age: 84
End: 2019-10-11

## 2019-10-11 NOTE — TELEPHONE ENCOUNTER
Reason for Call:  Home Health Care    Same issue but another delay (this is 4th delay per patient request) in start of care to begin on 10/4/19. Please call back and advise.    Pt Provider: Vocal    Phone Number Homecare can be reached at:   [facility] (HOME CARE) [phone]       Can we leave a detailed message on this number? YES    Best Time: any    Call taken on 10/11/2019 at 10:30 AM by Maribel Crockett

## 2019-10-11 NOTE — TELEPHONE ENCOUNTER
I spoke with Arjun on the phone.  I discussed that at this time her workup has come back negative.  I didn't feel she was experiencing a heart failure exacerbation. Shared his mom's concerns that she voiced to me that she is feels like she is dying, which I validated and expressed my concerns regarding that.  We discussed that when she is ready we can get her arranged with palliative care and hospice if her condition worsens and we are out of treatment options.      I did voice my concerns that she was declining quickly and was concerned about her living past 6 months if she continues this current path and we aren't able to determine the cause. I told him I didn't give her a definitive prognosis and would need to calculate it based on her risk factors and medical conditions.    Linda decided to make herself DNR/DNI, which was already discussed with the family.  She would like to see what pulmonary has to offer, but if she is out of options, and not improving, she would plan to transition to hospice. I instructed Arjun to call with any further questions or concerns.     He thanked me for the call and felt better after discussing what transpired in the office visit.    Karla MOELLER, CNP

## 2019-10-11 NOTE — TELEPHONE ENCOUNTER
I called Arjun back. He would like to speak more with Karla directly about discussion yesterday that brought up hospice and palliative care as he feels this came as a bit of a surprise. I told him that I would pass message to Karla but that she is not in clinic today and it might be Monday when she calls him back. Arjun was ok with this. Best number to reach him is [phone].

## 2019-10-11 NOTE — TELEPHONE ENCOUNTER
[facility] Call Center    Phone Message    May a detailed message be left on voicemail: yes    Reason for Call: Other: Son Arjun calling to speak with Karla about Dorothys visit yesterday 10/10/19. Please call him back to speak with him about the visit.      Action Taken: Message routed to:  [facility] ([facility]

## 2019-10-15 ENCOUNTER — OFFICE VISIT (OUTPATIENT)
Dept: NURSING | Facility: CLINIC | Age: 84
End: 2019-10-15
Payer: MEDICARE

## 2019-10-15 DIAGNOSIS — J84.9 ILD (INTERSTITIAL LUNG DISEASE) (H): Primary | ICD-10-CM

## 2019-10-15 LAB
6 MIN WALK (FT): 100 FT
6 MIN WALK (M): 30 M

## 2019-10-15 PROCEDURE — 94729 DIFFUSING CAPACITY: CPT | Performed by: INTERNAL MEDICINE

## 2019-10-15 PROCEDURE — 94726 PLETHYSMOGRAPHY LUNG VOLUMES: CPT | Performed by: INTERNAL MEDICINE

## 2019-10-15 PROCEDURE — 94618 PULMONARY STRESS TESTING: CPT | Performed by: INTERNAL MEDICINE

## 2019-10-15 PROCEDURE — 94375 RESPIRATORY FLOW VOLUME LOOP: CPT | Performed by: INTERNAL MEDICINE

## 2019-10-15 NOTE — PROGRESS NOTES
PFT Lab Note: Complete PFT (malathi, DLCO, pleth) and 6 minute walk done for apt w/ Dr Gallegos.    (PFT order entered via Dr. Browning per original scheduled apt, rescheduled with Dr. Gallegos.

## 2019-10-16 ENCOUNTER — TRANSFERRED RECORDS (OUTPATIENT)
Dept: HEALTH INFORMATION MANAGEMENT | Facility: CLINIC | Age: 84
End: 2019-10-16

## 2019-10-16 ENCOUNTER — PATIENT OUTREACH (OUTPATIENT)
Dept: CARDIOLOGY | Facility: CLINIC | Age: 84
End: 2019-10-16

## 2019-10-16 ENCOUNTER — TELEPHONE (OUTPATIENT)
Dept: CARDIOLOGY | Facility: CLINIC | Age: 84
End: 2019-10-16

## 2019-10-16 DIAGNOSIS — I34.0 MITRAL VALVE INSUFFICIENCY, UNSPECIFIED ETIOLOGY: ICD-10-CM

## 2019-10-16 DIAGNOSIS — I50.30 (HFPEF) HEART FAILURE WITH PRESERVED EJECTION FRACTION (H): ICD-10-CM

## 2019-10-16 RX ORDER — HYDRALAZINE HYDROCHLORIDE 10 MG/1
20 TABLET, FILM COATED ORAL 3 TIMES DAILY
Qty: 180 TABLET | Refills: 3 | Status: SHIPPED | OUTPATIENT
Start: 2019-10-16

## 2019-10-16 NOTE — TELEPHONE ENCOUNTER
Received fax from [employer] for [employer]. The have said patient is not eligible to receive Eliquis because it is covered by insurance.   Called and spoke to patient, she had gotten a letter stating the same and had spoken to them. She gave them additional information and is now receiving the medication at no charge.     BRANDI Berger

## 2019-10-16 NOTE — PROGRESS NOTES
I called Linda to check in and see how she was doing. Has not made any decisions on starting Prednisone yet as she wants to wait till she meets with new pulmonologist on Nov 13th. She confirms she is doing her Torsemide at 40/20mg and she increased her hydralazine to 20 mg TID per last clinic visit. Yesterday was a super duper day and she felt great with her breathing. Today not quite as great, but overall feels better than she did at this time last week.   Reports her weight has been stable around 154 since she left the hospital. Yesterday it was down to 152.6 lbs and today it is at 153.6. she reports she did a 6MWT yesterday and she did much better than she thought she would with walking the whole time. We discussed keeping her medication regimen as is for right now and I would discuss things with Karla. Will call her back with any changes.   Lizett Zapata RN     Date: 10/16/2019    Time of Call: 11:09 AM     Diagnosis:  hypertension     [ VORB ] Ordering provider: Karla Mabry NP  Order: Increase Hydralazine to 20 mg TID (per last clinic visit, just updating order)     Order received by: Lizett Zapata RN      Follow-up/additional notes:

## 2019-10-17 ENCOUNTER — INFUSION THERAPY VISIT (OUTPATIENT)
Dept: INFUSION THERAPY | Facility: CLINIC | Age: 84
End: 2019-10-17
Payer: MEDICARE

## 2019-10-17 ENCOUNTER — DOCUMENTATION ONLY (OUTPATIENT)
Dept: SPIRITUAL SERVICES | Facility: CLINIC | Age: 84
End: 2019-10-17

## 2019-10-17 VITALS
BODY MASS INDEX: 27.21 KG/M2 | WEIGHT: 158.5 LBS | SYSTOLIC BLOOD PRESSURE: 136 MMHG | HEART RATE: 84 BPM | TEMPERATURE: 97.1 F | DIASTOLIC BLOOD PRESSURE: 74 MMHG

## 2019-10-17 DIAGNOSIS — M05.79 RHEUMATOID ARTHRITIS INVOLVING MULTIPLE SITES WITH POSITIVE RHEUMATOID FACTOR (H): Primary | ICD-10-CM

## 2019-10-17 LAB
DLCOUNC-%PRED-PRE: 59 %
DLCOUNC-PRE: 10.54 ML/MIN/MMHG
DLCOUNC-PRED: 17.86 ML/MIN/MMHG
ERV-%PRED-PRE: 106 %
ERV-PRE: 0.39 L
ERV-PRED: 0.37 L
EXPTIME-PRE: 6.39 SEC
FEF2575-%PRED-PRE: 61 %
FEF2575-PRE: 0.82 L/SEC
FEF2575-PRED: 1.33 L/SEC
FEFMAX-%PRED-PRE: 78 %
FEFMAX-PRE: 3.02 L/SEC
FEFMAX-PRED: 3.85 L/SEC
FEV1-%PRED-PRE: 69 %
FEV1-PRE: 1.19 L
FEV1FEV6-PRE: 73 %
FEV1FEV6-PRED: 76 %
FEV1FVC-PRE: 75 %
FEV1FVC-PRED: 76 %
FEV1SVC-PRE: 81 %
FEV1SVC-PRED: 66 %
FIFMAX-PRE: 1.97 L/SEC
FRCPLETH-%PRED-PRE: 95 %
FRCPLETH-PRE: 2.56 L
FRCPLETH-PRED: 2.69 L
FVC-%PRED-PRE: 69 %
FVC-PRE: 1.58 L
FVC-PRED: 2.27 L
IC-%PRED-PRE: 48 %
IC-PRE: 1.07 L
IC-PRED: 2.22 L
RVPLETH-%PRED-PRE: 93 %
RVPLETH-PRE: 2.17 L
RVPLETH-PRED: 2.32 L
TLCPLETH-%PRED-PRE: 75 %
TLCPLETH-PRE: 3.63 L
TLCPLETH-PRED: 4.81 L
VA-%PRED-PRE: 61 %
VA-PRE: 2.72 L
VC-%PRED-PRE: 56 %
VC-PRE: 1.46 L
VC-PRED: 2.59 L

## 2019-10-17 PROCEDURE — 96365 THER/PROPH/DIAG IV INF INIT: CPT | Performed by: NURSE PRACTITIONER

## 2019-10-17 PROCEDURE — 99207 ZZC NO CHARGE NURSE ONLY: CPT

## 2019-10-17 ASSESSMENT — PAIN SCALES - GENERAL: PAINLEVEL: NO PAIN (0)

## 2019-10-17 NOTE — PROGRESS NOTES
Infusion Nursing Note:  Linda Spicer presents today for orencia.    Patient seen by provider today: No   present during visit today: Not Applicable.    Note: N/A.    Intravenous Access:  Peripheral IV placed.    Treatment Conditions:  Biological Infusion Checklist:  ~~~ NOTE: If the patient answers yes to any of the questions below, hold the infusion and contact ordering provider or on-call provider.    1. Have you recently had an elevated temperature, fever, chills, productive cough, coughing for 3 weeks or longer or hemoptysis, abnormal vital signs, night sweats,  chest pain or have you noticed a decrease in your appetite, unexplained weight loss or fatigue? No  2. Do you have any open wounds or new incisions? No  3. Do you have any recent or upcoming hospitalizations, surgeries or dental procedures? No  4. Do you currently have or recently have had any signs of illness or infection or are you on any antibiotics? No  5. Have you had any new, sudden or worsening abdominal pain? No  6. Have you or anyone in your household received a live vaccination in the past 4 weeks? Please note:  No live vaccines while on biologic/chemotherapy until 6 months after the last treatment.  Patient can receive the flu vaccine (shot only) and the pneumovax.  It is optimal for the patient to get these vaccines mid cycle, but they can be given at any time as long as it is not on the day of the infusion. No  7. Have you recently been diagnosed with any new nervous system diseases (ie. Multiple sclerosis, Guillain Paola, seizures, neurological changes) or cancer diagnosis? No  8. Are you on any form of radiation or chemotherapy? No  9. Are you pregnant or breast feeding or do you have plans of pregnancy in the future? No  10. Have you been having any signs of worsening depression or suicidal ideations?  (benlysta only) No  11. Have there been any other new onset medical symptoms? No        Post Infusion Assessment:  Patient  tolerated infusion without incident.  Site patent and intact, free from redness, edema or discomfort.  No evidence of extravasations.  Access discontinued per protocol.  Biologic Infusion Post Education: Call the triage nurse at your clinic or seek medical attention if you have chills and/or temperature greater than or equal to 100.5, uncontrolled nausea/vomiting, diarrhea, constipation, dizziness, shortness of breath, chest pain, heart palpitations, weakness or any other new or concerning symptoms, questions or concerns.  You cannot have any live virus vaccines prior to or during treatment or up to 6 months post infusion.  If you have an upcoming surgery, medical procedure or dental procedure during treatment, this should be discussed with your ordering physician and your surgeon/dentist.  If you are having any concerning symptom, if you are unsure if you should get your next infusion or wish to speak to a provider before your next infusion, please call your care coordinator or triage nurse at your clinic to notify them so we can adequately serve you.       Discharge Plan:   Patient and/or family verbalized understanding of discharge instructions and all questions answered.    Leda Rod RN

## 2019-10-17 NOTE — TELEPHONE ENCOUNTER
"SPIRITUAL HEALTH SERVICES Progress Note  [facility]    Visited in the [facility] center with  Lindaterell Spicer for ongoing emotional/spiritual support. Her son, Leonardo, was also present.   Offered support and affirmation as she shared her most recent experiences.  Offered prayer and [employer] communion at her request.       Illness Narrative - Patient shared that she was recently in the hospital with heart and breathing issues. She states that she is experiencing \"good and bad\" days and \"ups and downs\".       Distress - Patient talked about thinking about hospice care going forward, she voiced some of the pluses and minuses that concerned her. Not having to make so many trips for medical care would be a plus. She wants to make sure she can still receive her treatments here and also the eye care she has been receiving as that is important to her.        Coping - She is trying to take this a day at a time. She states that she feels at peace about whatever comes.  She tearfully talked about the hope of  meeting her miscarried children (5) in the life ahead. The patient and her son continue to share humor in their conversation and have not been afraid to talk about end-of-life.      Meaning-Making - Her family and her debbie give her life meaning.       Plan - The patient knows that she can request a visit from  as needed.  She voiced appreciation of the support.     Adrianne Gomez  Staff   [phone]    "

## 2019-10-18 DIAGNOSIS — Z53.9 DIAGNOSIS NOT YET DEFINED: Primary | ICD-10-CM

## 2019-10-18 PROCEDURE — G0179 MD RECERTIFICATION HHA PT: HCPCS | Performed by: INTERNAL MEDICINE

## 2019-10-23 ENCOUNTER — TELEPHONE (OUTPATIENT)
Dept: CARDIOLOGY | Facility: CLINIC | Age: 84
End: 2019-10-23

## 2019-10-23 NOTE — TELEPHONE ENCOUNTER
Health Call Center    Phone Message    May a detailed message be left on voicemail: yes    Reason for Call: Other: Kassandra, Nurse from [facility], Ph # [phone] - called to give message to Pt Care Team - Pt sees Karla Mabry NP in [place] - Pt missed her medications on Monday night and Tuesday the whole day - Pt has had a weight gain of 2.4 lbs from Friday - wants call back to know if you want Pt to have any additional doses of Furosemide to help with the weight gain? - Trey return her call to discuss - Thanks      Action Taken: Message routed to:  Other: [facility]

## 2019-10-23 NOTE — TELEPHONE ENCOUNTER
I called Kassandra back and left voicemail to get more information. Called Linda back to further assess. She reports she realized this morning she forgot to take her pills the last day and a half. Weight gain of 2.5 lbs. Her breathing feels the same as it has been, no worsening SOB/RUSSELL. She reports she notices some increased swelling around her ankles. She did take her 40 mg Lasix this morning and would be scheduled to take 20 mg this afternoon. I told her I would review with Karla to see if she should increase her dose short term and will call her back.   Linda is going down the magalalnes to play [email or web address] but per her it is ok to leave a detailed voicemail when we call back.     Lizett Zapata RN

## 2019-10-23 NOTE — TELEPHONE ENCOUNTER
I reviewed with Karla Mabry NP and called Linda back with following recommendations:    Date: 10/23/2019    Time of Call: 3:31 PM     Diagnosis:  HFpEF     [ VORB ] Ordering provider: Karla Mabry NP  Order: Increase Lasix to 40 mg BID for couple days until weight down to baseline. Take extra 20 mEq Potassium daily while on increased dose of Lasix.      Order received by: Lizett Zapata RN      Follow-up/additional notes: I told Linda I would call her on Friday to check in on her and can adjust things from there if needed.

## 2019-10-24 LAB
MDC_IDC_LEAD_IMPLANT_DT: NORMAL
MDC_IDC_LEAD_IMPLANT_DT: NORMAL
MDC_IDC_LEAD_LOCATION: NORMAL
MDC_IDC_LEAD_LOCATION: NORMAL
MDC_IDC_LEAD_LOCATION_DETAIL_1: NORMAL
MDC_IDC_LEAD_LOCATION_DETAIL_1: NORMAL
MDC_IDC_LEAD_MFG: NORMAL
MDC_IDC_LEAD_MFG: NORMAL
MDC_IDC_LEAD_MODEL: NORMAL
MDC_IDC_LEAD_MODEL: NORMAL
MDC_IDC_LEAD_POLARITY_TYPE: NORMAL
MDC_IDC_LEAD_POLARITY_TYPE: NORMAL
MDC_IDC_LEAD_SERIAL: NORMAL
MDC_IDC_LEAD_SERIAL: NORMAL
MDC_IDC_MSMT_BATTERY_DTM: NORMAL
MDC_IDC_MSMT_BATTERY_REMAINING_LONGEVITY: 65 MO
MDC_IDC_MSMT_BATTERY_RRT_TRIGGER: 2.83
MDC_IDC_MSMT_BATTERY_STATUS: NORMAL
MDC_IDC_MSMT_BATTERY_VOLTAGE: 3 V
MDC_IDC_MSMT_LEADCHNL_RA_IMPEDANCE_VALUE: 342 OHM
MDC_IDC_MSMT_LEADCHNL_RA_IMPEDANCE_VALUE: 570 OHM
MDC_IDC_MSMT_LEADCHNL_RA_SENSING_INTR_AMPL: 1.12 MV
MDC_IDC_MSMT_LEADCHNL_RA_SENSING_INTR_AMPL: 1.88 MV
MDC_IDC_MSMT_LEADCHNL_RV_IMPEDANCE_VALUE: 380 OHM
MDC_IDC_MSMT_LEADCHNL_RV_IMPEDANCE_VALUE: 456 OHM
MDC_IDC_MSMT_LEADCHNL_RV_PACING_THRESHOLD_AMPLITUDE: 0.75 V
MDC_IDC_MSMT_LEADCHNL_RV_PACING_THRESHOLD_AMPLITUDE: 0.75 V
MDC_IDC_MSMT_LEADCHNL_RV_PACING_THRESHOLD_PULSEWIDTH: 0.4 MS
MDC_IDC_MSMT_LEADCHNL_RV_PACING_THRESHOLD_PULSEWIDTH: 0.4 MS
MDC_IDC_MSMT_LEADCHNL_RV_SENSING_INTR_AMPL: 19.88 MV
MDC_IDC_MSMT_LEADCHNL_RV_SENSING_INTR_AMPL: 22.5 MV
MDC_IDC_PG_IMPLANT_DTM: NORMAL
MDC_IDC_PG_MFG: NORMAL
MDC_IDC_PG_MODEL: NORMAL
MDC_IDC_PG_SERIAL: NORMAL
MDC_IDC_PG_TYPE: NORMAL
MDC_IDC_SESS_CLINIC_NAME: NORMAL
MDC_IDC_SESS_DTM: NORMAL
MDC_IDC_SESS_TYPE: NORMAL
MDC_IDC_SET_BRADY_HYSTRATE: NORMAL
MDC_IDC_SET_BRADY_LOWRATE: 80 {BEATS}/MIN
MDC_IDC_SET_BRADY_MAX_SENSOR_RATE: 130 {BEATS}/MIN
MDC_IDC_SET_BRADY_MODE: NORMAL
MDC_IDC_SET_LEADCHNL_RA_SENSING_ANODE_ELECTRODE_1: NORMAL
MDC_IDC_SET_LEADCHNL_RA_SENSING_ANODE_LOCATION_1: NORMAL
MDC_IDC_SET_LEADCHNL_RA_SENSING_CATHODE_ELECTRODE_1: NORMAL
MDC_IDC_SET_LEADCHNL_RA_SENSING_CATHODE_LOCATION_1: NORMAL
MDC_IDC_SET_LEADCHNL_RA_SENSING_POLARITY: NORMAL
MDC_IDC_SET_LEADCHNL_RA_SENSING_SENSITIVITY: 0.3 MV
MDC_IDC_SET_LEADCHNL_RV_PACING_AMPLITUDE: 1.5 V
MDC_IDC_SET_LEADCHNL_RV_PACING_ANODE_ELECTRODE_1: NORMAL
MDC_IDC_SET_LEADCHNL_RV_PACING_ANODE_LOCATION_1: NORMAL
MDC_IDC_SET_LEADCHNL_RV_PACING_CAPTURE_MODE: NORMAL
MDC_IDC_SET_LEADCHNL_RV_PACING_CATHODE_ELECTRODE_1: NORMAL
MDC_IDC_SET_LEADCHNL_RV_PACING_CATHODE_LOCATION_1: NORMAL
MDC_IDC_SET_LEADCHNL_RV_PACING_POLARITY: NORMAL
MDC_IDC_SET_LEADCHNL_RV_PACING_PULSEWIDTH: 0.4 MS
MDC_IDC_SET_LEADCHNL_RV_SENSING_ANODE_ELECTRODE_1: NORMAL
MDC_IDC_SET_LEADCHNL_RV_SENSING_ANODE_LOCATION_1: NORMAL
MDC_IDC_SET_LEADCHNL_RV_SENSING_CATHODE_ELECTRODE_1: NORMAL
MDC_IDC_SET_LEADCHNL_RV_SENSING_CATHODE_LOCATION_1: NORMAL
MDC_IDC_SET_LEADCHNL_RV_SENSING_POLARITY: NORMAL
MDC_IDC_SET_LEADCHNL_RV_SENSING_SENSITIVITY: 0.9 MV
MDC_IDC_SET_ZONE_DETECTION_INTERVAL: 350 MS
MDC_IDC_SET_ZONE_DETECTION_INTERVAL: 400 MS
MDC_IDC_SET_ZONE_TYPE: NORMAL
MDC_IDC_STAT_AT_BURDEN_PERCENT: 100 %
MDC_IDC_STAT_AT_DTM_END: NORMAL
MDC_IDC_STAT_AT_DTM_START: NORMAL
MDC_IDC_STAT_BRADY_AP_VP_PERCENT: 0.01 %
MDC_IDC_STAT_BRADY_AP_VS_PERCENT: 0 %
MDC_IDC_STAT_BRADY_AS_VP_PERCENT: 97.8 %
MDC_IDC_STAT_BRADY_AS_VS_PERCENT: 2.19 %
MDC_IDC_STAT_BRADY_DTM_END: NORMAL
MDC_IDC_STAT_BRADY_DTM_START: NORMAL
MDC_IDC_STAT_BRADY_RA_PERCENT_PACED: 0 %
MDC_IDC_STAT_BRADY_RV_PERCENT_PACED: 98.16 %
MDC_IDC_STAT_EPISODE_RECENT_COUNT: 0
MDC_IDC_STAT_EPISODE_RECENT_COUNT_DTM_END: NORMAL
MDC_IDC_STAT_EPISODE_RECENT_COUNT_DTM_START: NORMAL
MDC_IDC_STAT_EPISODE_TOTAL_COUNT: 0
MDC_IDC_STAT_EPISODE_TOTAL_COUNT: 0
MDC_IDC_STAT_EPISODE_TOTAL_COUNT: 4
MDC_IDC_STAT_EPISODE_TOTAL_COUNT: 601
MDC_IDC_STAT_EPISODE_TOTAL_COUNT_DTM_END: NORMAL
MDC_IDC_STAT_EPISODE_TOTAL_COUNT_DTM_START: NORMAL
MDC_IDC_STAT_EPISODE_TYPE: NORMAL

## 2019-10-25 NOTE — TELEPHONE ENCOUNTER
"I called Linda to check in after taking increased dose of Lasix x 2 days. She reports she is doing better than yesterday which was a rough day. Weight is down but not back down to baseline, she was at 155.6 today. Her shortness of breath is \"about the same\". She sonds short of breath talking on the phone.     She'd like to take the increased dose of Lasix 40 mg twice daily for another couple days. I told her I would review that with Kalra. Advised if she does do this that she should still take the extra 20 mEq Potassium while she's on the higher dose.       "

## 2019-10-25 NOTE — TELEPHONE ENCOUNTER
Reviewed with Karla Mabry NP who is ok with continuing increase in Lasix for a couple more days.     Date: 10/25/2019    Time of Call: 11:26 AM     Diagnosis:  HFpEF      [ VORB ] Ordering provider: Karla Mabry NP  Order: Continue Lasix 40 mg BID for couple more days. Take extra 20 mEq Potassium while on increased dose of Lasix. If still needing increased dose on Monday get BMP     Order received by: Lizett Zapata RN      Follow-up/additional notes: Reviewed this with Linda who verbalized understanding. I will call her on Monday to check in.

## 2019-10-28 ENCOUNTER — PATIENT OUTREACH (OUTPATIENT)
Dept: CARDIOLOGY | Facility: CLINIC | Age: 84
End: 2019-10-28

## 2019-10-28 NOTE — PROGRESS NOTES
Called Linda to check in. She reports she is feeling much better and weight down to 154. 5 lbs today. Going back to 40 mg in the morning, 20 mg in the afternoon today. She walked to the bistro and mailbox and was a little out of breath, otherwise her breathing was feeling good. I told her I would touch base with Karla next week and call her back regarding a follow up plan.   Lizett Zapata RN

## 2019-10-30 ENCOUNTER — TELEPHONE (OUTPATIENT)
Dept: FAMILY MEDICINE | Facility: CLINIC | Age: 84
End: 2019-10-30

## 2019-10-30 NOTE — TELEPHONE ENCOUNTER
Reason for Call:  Home Health Care    Discharge on 11/6.    Pt Provider: [facility]    Phone Number Homecare Nurse can be reached at: [facility] home [facility] [phone]    Can we leave a detailed message on this number? YES    Best Time: any    Call taken on 10/30/2019 at 10:31 AM by Maribel Crockett

## 2019-10-31 ENCOUNTER — MEDICAL CORRESPONDENCE (OUTPATIENT)
Dept: HEALTH INFORMATION MANAGEMENT | Facility: CLINIC | Age: 84
End: 2019-10-31

## 2019-10-31 NOTE — TELEPHONE ENCOUNTER
This writer attempted to contact Kassandra PAZ case  care on 10/31/19    Reason for call homecare and left detailed message.    When patient calls back, please contact 1st floor Aura Bran. routine priority.        Usama Ching

## 2019-11-05 ENCOUNTER — OFFICE VISIT (OUTPATIENT)
Dept: RHEUMATOLOGY | Facility: CLINIC | Age: 84
End: 2019-11-05
Payer: MEDICARE

## 2019-11-05 ENCOUNTER — TELEPHONE (OUTPATIENT)
Dept: CARDIOLOGY | Facility: CLINIC | Age: 84
End: 2019-11-05

## 2019-11-05 VITALS
OXYGEN SATURATION: 98 % | HEIGHT: 64 IN | WEIGHT: 158.2 LBS | DIASTOLIC BLOOD PRESSURE: 62 MMHG | HEART RATE: 81 BPM | BODY MASS INDEX: 27.01 KG/M2 | SYSTOLIC BLOOD PRESSURE: 116 MMHG

## 2019-11-05 DIAGNOSIS — M05.79 RHEUMATOID ARTHRITIS INVOLVING MULTIPLE SITES WITH POSITIVE RHEUMATOID FACTOR (H): Primary | ICD-10-CM

## 2019-11-05 DIAGNOSIS — I50.30 HEART FAILURE WITH PRESERVED EJECTION FRACTION, NYHA CLASS I (H): Primary | ICD-10-CM

## 2019-11-05 DIAGNOSIS — I50.33 ACUTE ON CHRONIC DIASTOLIC HEART FAILURE (H): ICD-10-CM

## 2019-11-05 DIAGNOSIS — Z79.899 HIGH RISK MEDICATIONS (NOT ANTICOAGULANTS) LONG-TERM USE: ICD-10-CM

## 2019-11-05 DIAGNOSIS — I50.30 HEART FAILURE WITH PRESERVED EJECTION FRACTION, NYHA CLASS I (H): ICD-10-CM

## 2019-11-05 LAB
ANION GAP SERPL CALCULATED.3IONS-SCNC: 7 MMOL/L (ref 3–14)
BUN SERPL-MCNC: 56 MG/DL (ref 7–30)
CALCIUM SERPL-MCNC: 9.5 MG/DL (ref 8.5–10.1)
CHLORIDE SERPL-SCNC: 105 MMOL/L (ref 94–109)
CO2 SERPL-SCNC: 25 MMOL/L (ref 20–32)
CREAT SERPL-MCNC: 1.73 MG/DL (ref 0.52–1.04)
GFR SERPL CREATININE-BSD FRML MDRD: 26 ML/MIN/{1.73_M2}
GLUCOSE SERPL-MCNC: 99 MG/DL (ref 70–99)
POTASSIUM SERPL-SCNC: 4.6 MMOL/L (ref 3.4–5.3)
SODIUM SERPL-SCNC: 137 MMOL/L (ref 133–144)

## 2019-11-05 PROCEDURE — 99213 OFFICE O/P EST LOW 20 MIN: CPT | Performed by: INTERNAL MEDICINE

## 2019-11-05 PROCEDURE — 36415 COLL VENOUS BLD VENIPUNCTURE: CPT | Performed by: NURSE PRACTITIONER

## 2019-11-05 PROCEDURE — 80048 BASIC METABOLIC PNL TOTAL CA: CPT | Performed by: NURSE PRACTITIONER

## 2019-11-05 RX ORDER — AZATHIOPRINE 50 MG/1
50 TABLET ORAL DAILY
Qty: 90 TABLET | Refills: 2 | Status: SHIPPED | OUTPATIENT
Start: 2019-11-05

## 2019-11-05 RX ORDER — FUROSEMIDE 20 MG
40 TABLET ORAL 2 TIMES DAILY
Qty: 180 TABLET | Refills: 1 | Status: ON HOLD | OUTPATIENT
Start: 2019-11-05 | End: 2019-11-16

## 2019-11-05 ASSESSMENT — MIFFLIN-ST. JEOR: SCORE: 1132.59

## 2019-11-05 NOTE — TELEPHONE ENCOUNTER
"I called Linda to further assess. She is concerned about her blood pressure this morning elevated at 120/91. She reports it was rechecked at her doctor's office and was 118/62. She thinks her blood pressure being higher is associated with her weight going up. Weight is up 2 lbs today up to 156. She reports otherwise her blood pressures have been good at 110s/60-70s.     Denies any worsening SOB, states \"well my breathing is always bad\". Has some mild swelling in legs but can't tell me if it's better or worse than it was a couple weeks ago.     Reviewed with Karla Mabry NP and called Linda back with following verbal orders:    Date: 11/5/2019    Time of Call: 3:00 PM     Diagnosis:  HFpEF     [ VORB ] Ordering provider: Karla Mabry NP  Order: Increase Lasix to 40 mg BID. BMP in next day     Order received by: Lizett Zapata RN      Follow-up/additional notes:        "

## 2019-11-05 NOTE — PATIENT INSTRUCTIONS
Rheumatology    Dr. Mario Davenport       [facility] in [place]   (Monday)  [address]  [place] [address]       [facility] in [place]   (Tuesday)  [address] [place] [address]    [facility] in [place]   (Wed., Thurs., and Friday)  [address] [place] [address]    Phone number: [phone]  Thank you for choosing [facility].  LATASHA Poole RMA

## 2019-11-05 NOTE — NURSING NOTE
RAPID3 (0-30) Cumulative Score  19.7          RAPID3 Weighted Score (divide #4 by 3 and that is the weighted score)  6.5

## 2019-11-05 NOTE — TELEPHONE ENCOUNTER
[facility] Call Center    Phone Message    May a detailed message be left on voicemail: yes    Reason for Call: Other: pt calling in to inform blood pressure this morning was 120/91 and her heart rate was 82    also pt was on lasix for weight going up when the lowered the doseage she gained a 1.6 pounds and wanted Karla to be aware of that please reach out to pt as soon as possible      Action Taken: Message routed to:  [facility] cardio

## 2019-11-05 NOTE — PROGRESS NOTES
"Rheumatology Clinic Visit      Linda Spicer MRN# [id number]   YOB: 1931 Age: 88 year old      Date of visit: 11/05/19   PCP: Dr. Tre Lockett  Pulmonology: [facility]   Cardiology: Dr. Emeterio Loza  Dermatology: Dr. Andrews Knott at [facility] in [place]     Chief Complaint   Patient presents with:  RECHECK: RA    Assessment and Plan     1. Seropositive Nonerosive Rheumatoid Arthritis (RF 99, CCP 52): Previously treated with hydroxychloroquine 200 mg daily (stopped previously because of macular degeneration), methotrexate (leg cramps), Cimzia (stopped due to recurrent basal cell carcinoma and hx of heart failure). Has sulfa allergy. Leflunomide avoided because of ILD.  Currently on Orencia IV and azathioprine 50mg daily. Doing well today with regard to RA and therefore will maintain the current medication regimen.  TPMT 23.2 (normal 24-44) and considered \"intermediate activity\".  - Continue orencia 750mg IV every 4 weeks, administered  at the [facility]  - Continue azathioprine 50mg daily  - Labs every 3 months to be done with her Orencia infusions (every third infusion): CBC, Creatinine, Hepatic Panel    2. Bilateral shoulder impingement syndrome, history: Maintaining ROM with exercises at home. She was previously referred to PT but did not go. Not an issue today.     3. History of basal cell carcinoma: Reportedly with lesions removed in 2007, fall 2016 and fall 2017.    4. Heart failure: She is followed by cardiology.  Has a pacemaker, per patient.  Seems volume-up, with crackles in her lungs and mild pitting edema bilaterally; she says she has already contacted her cardiology clinic regarding this.  More symptomatic since she had reduced her Lasix dose from 40 mg twice daily to 40 mg in the morning and 20 mg in the evening.    5. 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  2013 chest CT with contrast performed at [facility] documents UIP pattern. Follows with Dr. Comer and plans to see another provider soon. 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      6. Bone Health: Managed by PCP / endocrinology.     Ms. Spicer verbalized agreement with and understanding of the rational for the diagnosis and treatment plan.  All questions were answered to best of my ability and the patient's satisfaction. Ms. Spicer was advised to contact the clinic with any questions that may arise after the clinic visit.      Thank you for involving me in the care of the patient    Return to clinic: 6 months      HPI   Linda Spicer is a 88 year old female with a medical history significant for hypertension, heart failure, pulmonary fibrosis, audible bowel syndrome, degenerative disc disease of the lumbar spine status post laminectomy, chronic kidney disease, history of basal cell carcinoma, and rheumatoid arthritis who presents for follow-up of rheumatoid arthritis.    Today, she reports that she is doing well with regard to rheumatoid arthritis.  Tolerating meds.  Gained weight since reducing lasix. Already has a call in to the cardiology clinic today.     Denies fevers, chills, nausea, vomiting. No abdominal pain. No chest pain/pressure, palpitations.  Chronic SOB. No oral or nasal sores. No neck pain.   No photosensitivity or photophobia.   No eye pain or redness.     Tobacco: None  EtOH: rare  Drugs: none  Used to live in [place], [place] part time.    ROS   GEN: No fevers, chills, night sweats, or weight change  SKIN: See HPI  HEENT: No epistaxis. No oral or nasal ulcers.  CV: See HPI  PULM: See HPI  GI: No nausea, vomiting. No blood in stool. No abdominal pain.  : No blood in urine.  MSK: See HPI.  EXT: No LE swelling  PSYCH: Negative    Active Problem List     Patient Active Problem List   Diagnosis     Hypertension goal BP (blood pressure) < 150/90     Hypothyroidism     Seropositive  rheumatoid arthritis (H)     Macular degeneration, left eye     Irritable bowel syndrome     Encounter for palliative care     Adjustment disorder with anxious mood     Mild anemia     DDD (degenerative disc disease), lumbar     CKD (chronic kidney disease) stage 3, GFR 30-59 ml/min (H)     History of blood transfusion     Aftercare following surgery     S/P lumbar laminectomy     High risk medication use     Age-related osteoporosis without current pathological fracture     Atrophic vaginitis     Fecal incontinence     Female stress incontinence     Impingement syndrome of both shoulders     UIP (usual interstitial pneumonitis) (H)     High risk medications (not anticoagulants) long-term use     Heart failure with preserved ejection fraction (H)     Congestive heart failure with preserved LV function, NYHA class 3 (H)     Other specified hypothyroidism     Rheumatoid arthritis involving multiple sites with positive rheumatoid factor (H)     Health Care Home     Sick sinus syndrome (H)     Cardiac pacemaker - Medtronic dual lead pacemaker - Not Dependent - MRI Safe     Immunosuppression (H)     ILD (interstitial lung disease) (H)     Heart failure with preserved ejection fraction, NYHA class I (H)     Atrial flutter (H)     Persistent atrial fibrillation     CHF exacerbation (H)     Pre-syncope     Mitral regurgitation     SOB (shortness of breath)     Mitral valve insufficiency, unspecified etiology     Abnormal findings on diagnostic imaging of cardiovascular system     Status post coronary angiogram     Dyspnea     RUSSELL (dyspnea on exertion)     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     Swelling of right lower extremity     Enterococcus UTI     Renal insufficiency     Urinary retention     Acute respiratory distress     Past Medical History     Past Medical History:   Diagnosis Date     Adjustment disorder with anxious mood 5/18/2015     Advanced directives, counseling/discussion 8/30/2012    Patient states  has Advance Directive and will bring in a copy to clinic. 8/30/2012   [facility] Medical Assistant \       Anemia 9/25/2015     Basal cell cancer      CHF (congestive heart failure) (H) 9/18/2014     CKD (chronic kidney disease) stage 3, GFR 30-59 ml/min (H) 9/29/2015     DDD (degenerative disc disease), lumbar 9/25/2015     Diffuse idiopathic pulmonary fibrosis (H) 5/6/2013     Encounter for palliative care 5/18/2015     History of blood transfusion 9/29/2015     Hypertension goal BP (blood pressure) < 140/90 9/7/2012     Hypothyroid 9/7/2012     Irritable bowel syndrome 10/29/2013     Macular degeneration      Macular degeneration, 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     Nondisplaced spiral fracture of shaft of humerus      Osteoporosis 8/13/2013     Imo Update utility     RA (rheumatoid arthritis) (H) 5/7/2013     Rheumatic fever      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 2/20/2019     Shingles      Spinal stenosis of lumbar region with neurogenic claudication 9/14/2015     Past Surgical History     Past Surgical History:   Procedure Laterality Date     ANESTHESIA CARDIOVERSION N/A 9/14/2018    Procedure: ANESTHESIA CARDIOVERSION;;  Surgeon: GENERIC ANESTHESIA PROVIDER;  [facility]     ANESTHESIA CARDIOVERSION N/A 10/11/2018    Procedure: ANESTHESIA CARDIOVERSION;  Cardioversion;  Surgeon: GENERIC ANESTHESIA PROVIDER;  [facility]     ANESTHESIA CARDIOVERSION N/A 10/16/2018    Procedure: Anesthesia Cardioversion ;  Surgeon: GENERIC ANESTHESIA PROVIDER;  Location:  OR     APPENDECTOMY       BIOPSY      hemorrhoidectomy     CV HEART CATHETERIZATION WITH POSSIBLE INTERVENTION N/A 1/31/2019    Procedure: CORS,LHC,RHC-YOLANDA BEFORE CATH APPOINTMENT;  Surgeon: Avila Watson MD;  Location:  HEART CARDIAC CATH LAB     CV MITRACLIP N/A 2/11/2019    Procedure: Mitraclip Procedure;  Surgeon: Avila Watson MD;  Location:  OR     ENT SURGERY      tonsillectomy     GYN SURGERY      3 D & C's      HYSTERECTOMY, PAP NO LONGER INDICATED       LAMINECTOMY LUMBAR ONE LEVEL N/A 10/13/2015    Procedure: LAMINECTOMY LUMBAR ONE LEVEL;  Surgeon: Fransico Toussaint MD;  [facility]     Allergy     Allergies   Allergen Reactions     Cephalexin Diarrhea and GI Disturbance     Other reaction(s): GI Upset       Cephalexin Hcl Diarrhea     Gabapentin Other (See Comments)     Dizzsiness  Shaky, dizzy, dry mouth  Dizzsiness  Shaky,dry mouth       Methotrexate Other (See Comments)     Depleted Folic Acid  And caused severe leg cramps  Severe leg cramps     Naproxen GI Disturbance, Other (See Comments) and Nausea     Constipation. Tolerates ibuprofen.       Perfume      Sulfa Drugs Shortness Of Breath     Throat swelling     Alprazolam Other (See Comments)     Dizziness      Levofloxacin GI Disturbance     Macrobid [Nitrofurantoin Anhydrous]      Possibly related to lung disease      Nitrofurantoin      Possibly related to lung disease      Seasonal Allergies      Ciprofloxacin Itching and Rash     Current Medication List     Current Outpatient Medications   Medication Sig     apixaban ANTICOAGULANT (ELIQUIS ANTICOAGULANT) 2.5 MG tablet Take 1 tablet (2.5 mg) by mouth 2 times daily     azaTHIOprine (IMURAN) 50 MG tablet Take 1 tablet (50 mg) by mouth daily     carvedilol (COREG) 3.125 MG tablet Take 1 tablet (3.125 mg) by mouth 2 times daily (with meals)     COMPOUNDED NON-CONTROLLED SUBSTANCE (CMPD RX) - PHARMACY TO MIX COMPOUNDED MEDICATION Estriol 1mg/gram. Place 1 gram vaginally daily for 2 weeks. Then vaginally twice weekly     Cranberry 400 MG TABS Take 1 tablet by mouth daily     denosumab (PROLIA) 60 MG/ML SOLN injection Inject 1 mL (60 mg) Subcutaneous every 6 months     fish oil-omega-3 fatty acids 1000 MG capsule Take 1 g by mouth 2 times daily     furosemide (LASIX) 20 MG tablet Take 40 mg in the am and 20 mg in the afternoon.     hydrALAZINE (APRESOLINE) 10 MG tablet Take 2 tablets (20 mg) by mouth 3 times  daily     isosorbide mononitrate (IMDUR) 60 MG 24 hr tablet Take 1 tablet (60 mg) by mouth daily     levothyroxine (SYNTHROID/LEVOTHROID) 75 MCG tablet TAKE 1 TABLET BY MOUTH EVERY DAY     Multiple Vitamins-Minerals (PRESERVISION AREDS) CAPS Take 1 capsule by mouth 2 times daily     nitroGLYcerin (NITROSTAT) 0.4 MG sublingual tablet Place 1 tablet (0.4 mg) under the tongue every 5 minutes as needed for chest pain     order for DME Dispense SP Nam-alberta     order for DME Equipment being ordered: Dispense baffle, for use with nebulizer.     order for DME Equipment being ordered: Nebulizer. Use with Albuterol.     order for DME Equipment being ordered: Dispense face mask.  Mrs. Spicer is immunosuppressed due to rheumatoid arthritis. (Patient not taking: Reported on 10/17/2019)     potassium chloride ER (K-DUR/KLOR-CON M) 20 MEQ CR tablet Take 2 tablets (40 mEq) by mouth daily     ranitidine (ZANTAC) 150 MG tablet Take 150 mg by mouth daily     senna-docusate (SENOKOT-S/PERICOLACE) 8.6-50 MG tablet Take 1 tablet by mouth daily as needed for constipation     trimethoprim (TRIMPEX) 100 MG tablet Take 0.5 tablets (50 mg) by mouth daily     vitamin C (ASCORBIC ACID) 250 MG tablet Take 250 mg by mouth daily     No current facility-administered medications for this visit.        Social History   See HPI    Family History     Family History   Problem Relation Age of Onset     Hypertension Mother      Psychotic Disorder Father      Diabetes Son      Diabetes Daughter      Blood Disease Daughter      Physical Exam     Temp Readings from Last 3 Encounters:   10/17/19 97.1  F (36.2  C)   10/10/19 98.3  F (36.8  C) (Oral)   10/01/19 97.5  F (36.4  C) (Oral)     BP Readings from Last 5 Encounters:   10/17/19 136/74   10/10/19 (!) 143/80   10/10/19 124/76   10/01/19 119/49   09/23/19 114/73     Pulse Readings from Last 1 Encounters:   10/17/19 84     Resp Readings from Last 1 Encounters:   10/10/19 18     Estimated body mass index  "is 27.21 kg/m  as calculated from the following:    Height as of 10/10/19: 1.626 m (5' 4\").    Weight as of 10/17/19: 71.9 kg (158 lb 8 oz).    GEN: NAD  HEENT: MMM. No oral lesions. Anicteric, noninjected sclera  CV: S1, S2. RRR. No m/r/g.  PULM: faint crackles bilaterally in lung bases; no wheezing.  No increased work of breathing.  MSK: Hands, wrists, elbows, and shoulders without swelling or tenderness to palpation. Hips nontender to palpation. Bilateral knees with mild medial joint line tenderness but no effusion or increased warmth.  Ankles and MTPs without swelling or tenderness to palpation.    NEURO: UE and LE strengths 5/5 and equal bilaterally.   EXT: 1+ pitting edema to the knees bilaterally  SKIN: No rash  PSYCH: Alert. Appropriate.    Labs / Imaging (select studies)   RF/CCP  Recent Labs   Lab Test 04/05/16  1036   CCPIGG 52*   RHF 99*     CBC  Recent Labs   Lab Test 10/01/19  0606 09/30/19  0641 09/29/19  0718  09/26/19  0334 09/25/19  1020 07/05/19  1618   WBC 5.4 5.0 6.4   < > 5.0 5.2 5.8   RBC 3.75* 3.85 3.89   < > 3.67* 3.79* 3.95   HGB 12.2 12.8 12.7   < > 12.0 12.5 12.3   HCT 37.4 38.5 39.1   < > 37.2 38.9 39.0    100 101*   < > 101* 103* 99   RDW 14.3 14.3 14.5   < > 14.7 14.6 14.5    200 220   < > 216 235 235   MCH 32.5 33.2* 32.6   < > 32.7 33.0 31.1   MCHC 32.6 33.2 32.5   < > 32.3 32.1 31.5   NEUTROPHIL  --   --   --   --  59.9 65.6 62.8   LYMPH  --   --   --   --  22.4 17.6 19.7   MONOCYTE  --   --   --   --  13.5 12.7 13.7   EOSINOPHIL  --   --   --   --  3.2 3.3 2.9   BASOPHIL  --   --   --   --  0.6 0.6 0.7   ANEU  --   --   --   --  3.0 3.4 3.7   ALYM  --   --   --   --  1.1 0.9 1.2   YEHUDA  --   --   --   --  0.7 0.7 0.8   AEOS  --   --   --   --  0.2 0.2 0.2   ABAS  --   --   --   --  0.0 0.0 0.0    < > = values in this interval not displayed.     CMP  Recent Labs   Lab Test 10/10/19  1013 10/01/19  0606 09/30/19  1752 09/30/19  0641  09/25/19  1020  06/20/19  0919  " 02/11/19  1225    138  --  139   < > 140   < >  --    < > 142   POTASSIUM 4.0 4.1 3.9 3.5   < > 4.0   < >  --    < > 3.8   CHLORIDE 105 104  --  103   < > 106   < >  --    < > 107   CO2 30 24  --  28   < > 30   < >  --    < > 26   ANIONGAP 4 10  --  9   < > 4   < >  --    < > 9   * 82  --  84   < > 65*   < >  --    < > 106*   BUN 46* 53*  --  49*   < > 36*   < >  --    < > 44*   CR 1.74* 1.62*  --  1.54*   < > 1.48*   < > 1.71*   < > 1.71*   GFRESTIMATED 26* 28*  --  30*   < > 31*   < > 26*   < > 26*   GFRESTBLACK 30* 32*  --  34*   < > 36*   < > 30*   < > 31*   FATOUMATA 8.8 8.5  --  8.6   < > 8.9   < > 9.3   < > 8.3*   BILITOTAL  --   --   --   --   --  0.5  --  0.4  --  0.4   ALBUMIN  --   --   --   --   --  3.5  --  3.6  --  3.1*   PROTTOTAL  --   --   --   --   --  7.2  --  7.5  --  6.7*   ALKPHOS  --   --   --   --   --  64  --  67  --  47   AST  --   --   --   --   --  26  --  44  --  20   ALT  --   --   --   --   --  29  --  43  --  16    < > = values in this interval not displayed.     Calcium/VitaminD  Recent Labs   Lab Test 10/10/19  1013 10/01/19  0606 09/30/19  0641   FATOUMATA 8.8 8.5 8.6     ESR/CRP  Recent Labs   Lab Test 09/26/19  0334 09/25/19  1020 01/17/19  0531 01/16/19  0525 12/01/18  0759   SED 18  --  25  --  28   CRP  --  <2.9  --  <2.9 <2.9     Hepatitis B  Recent Labs   Lab Test 04/05/16  1036   HBCAB Nonreactive   HEPBANG Nonreactive     Hepatitis C  Recent Labs   Lab Test 04/05/16  1036   HCVAB Nonreactive   Assay performance characteristics have not been established for newborns,   infants, and children       Tuberculosis Screening  Recent Labs   Lab Test 02/21/17  1148 04/05/16  1037   TBRSLT Negative Negative   TBAGN 0.19 0.03     HIV Screening  Recent Labs   Lab Test 04/05/16  1036   HIAGAB Nonreactive   HIV-1 p24 Ag & HIV-1/HIV-2 Ab Not Detected       Immunization History     Immunization History   Administered Date(s) Administered     Influenza (High Dose) 3 valent vaccine  09/07/2012, 10/08/2013, 09/26/2014, 09/30/2015, 09/21/2016, 10/13/2017, 09/27/2018, 09/26/2019     Influenza (IIV3) PF 08/29/2011     Mantoux Tuberculin Skin Test 12/04/2018     Pneumo Conj 13-V (2010&after) 04/05/2016     Pneumococcal 23 valent 05/01/2001, 10/04/2010     TD (ADULT, 7+) 07/01/2008     TDAP Vaccine (Boostrix) 09/11/2017          Chart documentation done in part with Dragon Voice recognition Software. Although reviewed after completion, some word and grammatical error may remain.    Mario Davenport MD

## 2019-11-06 DIAGNOSIS — I50.31 ACUTE DIASTOLIC HEART FAILURE (H): ICD-10-CM

## 2019-11-06 DIAGNOSIS — I50.30 HEART FAILURE WITH PRESERVED EJECTION FRACTION, UNSPECIFIED HF CHRONICITY (H): Primary | ICD-10-CM

## 2019-11-07 ENCOUNTER — MEDICAL CORRESPONDENCE (OUTPATIENT)
Dept: HEALTH INFORMATION MANAGEMENT | Facility: CLINIC | Age: 84
End: 2019-11-07

## 2019-11-13 ENCOUNTER — OFFICE VISIT (OUTPATIENT)
Dept: PULMONOLOGY | Facility: CLINIC | Age: 84
DRG: 392 | End: 2019-11-13
Attending: INTERNAL MEDICINE
Payer: MEDICARE

## 2019-11-13 ENCOUNTER — ANCILLARY PROCEDURE (OUTPATIENT)
Dept: CT IMAGING | Facility: CLINIC | Age: 84
End: 2019-11-13
Attending: INTERNAL MEDICINE
Payer: MEDICARE

## 2019-11-13 ENCOUNTER — TELEPHONE (OUTPATIENT)
Dept: FAMILY MEDICINE | Facility: CLINIC | Age: 84
End: 2019-11-13

## 2019-11-13 VITALS
DIASTOLIC BLOOD PRESSURE: 69 MMHG | HEIGHT: 64 IN | HEART RATE: 85 BPM | BODY MASS INDEX: 27.02 KG/M2 | WEIGHT: 158.29 LBS | TEMPERATURE: 97.7 F | SYSTOLIC BLOOD PRESSURE: 113 MMHG | OXYGEN SATURATION: 98 % | RESPIRATION RATE: 18 BRPM

## 2019-11-13 DIAGNOSIS — J84.9 ILD (INTERSTITIAL LUNG DISEASE) (H): ICD-10-CM

## 2019-11-13 DIAGNOSIS — E03.9 HYPOTHYROIDISM, UNSPECIFIED TYPE: ICD-10-CM

## 2019-11-13 DIAGNOSIS — J84.9 ILD (INTERSTITIAL LUNG DISEASE) (H): Primary | ICD-10-CM

## 2019-11-13 DIAGNOSIS — M05.79 RHEUMATOID ARTHRITIS INVOLVING MULTIPLE SITES WITH POSITIVE RHEUMATOID FACTOR (H): ICD-10-CM

## 2019-11-13 DIAGNOSIS — I50.31 ACUTE DIASTOLIC HEART FAILURE (H): ICD-10-CM

## 2019-11-13 DIAGNOSIS — Z79.899 HIGH RISK MEDICATIONS (NOT ANTICOAGULANTS) LONG-TERM USE: ICD-10-CM

## 2019-11-13 LAB
ALBUMIN SERPL-MCNC: 3.4 G/DL (ref 3.4–5)
ALBUMIN UR-MCNC: NEGATIVE MG/DL
ALP SERPL-CCNC: 54 U/L (ref 40–150)
ALT SERPL W P-5'-P-CCNC: 20 U/L (ref 0–50)
ANION GAP SERPL CALCULATED.3IONS-SCNC: 5 MMOL/L (ref 3–14)
APPEARANCE UR: ABNORMAL
AST SERPL W P-5'-P-CCNC: 18 U/L (ref 0–45)
BASOPHILS # BLD AUTO: 0 10E9/L (ref 0–0.2)
BASOPHILS NFR BLD AUTO: 0.4 %
BILIRUB DIRECT SERPL-MCNC: 0.2 MG/DL (ref 0–0.2)
BILIRUB SERPL-MCNC: 0.8 MG/DL (ref 0.2–1.3)
BILIRUB UR QL STRIP: NEGATIVE
BUN SERPL-MCNC: 40 MG/DL (ref 7–30)
CALCIUM SERPL-MCNC: 9 MG/DL (ref 8.5–10.1)
CHLORIDE SERPL-SCNC: 100 MMOL/L (ref 94–109)
CK SERPL-CCNC: 114 U/L (ref 30–225)
CO2 SERPL-SCNC: 29 MMOL/L (ref 20–32)
COLOR UR AUTO: YELLOW
CREAT SERPL-MCNC: 1.92 MG/DL (ref 0.52–1.04)
CRP SERPL-MCNC: 152 MG/L (ref 0–8)
DIFFERENTIAL METHOD BLD: ABNORMAL
EOSINOPHIL # BLD AUTO: 0.1 10E9/L (ref 0–0.7)
EOSINOPHIL NFR BLD AUTO: 1.2 %
ERYTHROCYTE [DISTWIDTH] IN BLOOD BY AUTOMATED COUNT: 13.2 % (ref 10–15)
ERYTHROCYTE [SEDIMENTATION RATE] IN BLOOD BY WESTERGREN METHOD: 76 MM/H (ref 0–30)
GFR SERPL CREATININE-BSD FRML MDRD: 23 ML/MIN/{1.73_M2}
GLUCOSE SERPL-MCNC: 77 MG/DL (ref 70–99)
GLUCOSE UR STRIP-MCNC: NEGATIVE MG/DL
HCT VFR BLD AUTO: 35.3 % (ref 35–47)
HGB BLD-MCNC: 11.6 G/DL (ref 11.7–15.7)
HGB UR QL STRIP: NEGATIVE
HYALINE CASTS #/AREA URNS LPF: 1 /LPF (ref 0–2)
IMM GRANULOCYTES # BLD: 0 10E9/L (ref 0–0.4)
IMM GRANULOCYTES NFR BLD: 0.5 %
KETONES UR STRIP-MCNC: NEGATIVE MG/DL
LEUKOCYTE ESTERASE UR QL STRIP: ABNORMAL
LIPASE SERPL-CCNC: 129 U/L (ref 73–393)
LYMPHOCYTES # BLD AUTO: 0.8 10E9/L (ref 0.8–5.3)
LYMPHOCYTES NFR BLD AUTO: 9.5 %
MCH RBC QN AUTO: 33.4 PG (ref 26.5–33)
MCHC RBC AUTO-ENTMCNC: 32.9 G/DL (ref 31.5–36.5)
MCV RBC AUTO: 102 FL (ref 78–100)
MONOCYTES # BLD AUTO: 0.8 10E9/L (ref 0–1.3)
MONOCYTES NFR BLD AUTO: 9.4 %
MUCOUS THREADS #/AREA URNS LPF: PRESENT /LPF
NEUTROPHILS # BLD AUTO: 6.4 10E9/L (ref 1.6–8.3)
NEUTROPHILS NFR BLD AUTO: 79 %
NITRATE UR QL: NEGATIVE
NRBC # BLD AUTO: 0 10*3/UL
NRBC BLD AUTO-RTO: 0 /100
PH UR STRIP: 6 PH (ref 5–7)
PLATELET # BLD AUTO: 218 10E9/L (ref 150–450)
POTASSIUM SERPL-SCNC: 3.8 MMOL/L (ref 3.4–5.3)
PROT SERPL-MCNC: 7.6 G/DL (ref 6.8–8.8)
RADIOLOGIST FLAGS: ABNORMAL
RBC # BLD AUTO: 3.47 10E12/L (ref 3.8–5.2)
RBC #/AREA URNS AUTO: 4 /HPF (ref 0–2)
SODIUM SERPL-SCNC: 134 MMOL/L (ref 133–144)
SOURCE: ABNORMAL
SP GR UR STRIP: 1.01 (ref 1–1.03)
SQUAMOUS #/AREA URNS AUTO: 4 /HPF (ref 0–1)
T4 FREE SERPL-MCNC: 1.73 NG/DL (ref 0.76–1.46)
TSH SERPL DL<=0.005 MIU/L-ACNC: 0.38 MU/L (ref 0.4–4)
UROBILINOGEN UR STRIP-MCNC: 0 MG/DL (ref 0–2)
WBC # BLD AUTO: 8.1 10E9/L (ref 4–11)
WBC #/AREA URNS AUTO: 12 /HPF (ref 0–5)

## 2019-11-13 PROCEDURE — 87086 URINE CULTURE/COLONY COUNT: CPT | Performed by: INTERNAL MEDICINE

## 2019-11-13 PROCEDURE — 86331 IMMUNODIFFUSION OUCHTERLONY: CPT | Performed by: INTERNAL MEDICINE

## 2019-11-13 PROCEDURE — 84443 ASSAY THYROID STIM HORMONE: CPT | Performed by: INTERNAL MEDICINE

## 2019-11-13 PROCEDURE — 82787 IGG 1 2 3 OR 4 EACH: CPT | Performed by: INTERNAL MEDICINE

## 2019-11-13 PROCEDURE — 86235 NUCLEAR ANTIGEN ANTIBODY: CPT | Performed by: INTERNAL MEDICINE

## 2019-11-13 PROCEDURE — 86255 FLUORESCENT ANTIBODY SCREEN: CPT | Performed by: INTERNAL MEDICINE

## 2019-11-13 PROCEDURE — 85025 COMPLETE CBC W/AUTO DIFF WBC: CPT | Performed by: INTERNAL MEDICINE

## 2019-11-13 PROCEDURE — G0463 HOSPITAL OUTPT CLINIC VISIT: HCPCS | Mod: ZF

## 2019-11-13 PROCEDURE — 86039 ANTINUCLEAR ANTIBODIES (ANA): CPT | Performed by: INTERNAL MEDICINE

## 2019-11-13 PROCEDURE — 83690 ASSAY OF LIPASE: CPT | Performed by: INTERNAL MEDICINE

## 2019-11-13 PROCEDURE — 82784 ASSAY IGA/IGD/IGG/IGM EACH: CPT | Performed by: INTERNAL MEDICINE

## 2019-11-13 PROCEDURE — 80048 BASIC METABOLIC PNL TOTAL CA: CPT | Performed by: INTERNAL MEDICINE

## 2019-11-13 PROCEDURE — 86038 ANTINUCLEAR ANTIBODIES: CPT | Performed by: INTERNAL MEDICINE

## 2019-11-13 PROCEDURE — 82085 ASSAY OF ALDOLASE: CPT | Performed by: INTERNAL MEDICINE

## 2019-11-13 PROCEDURE — 80076 HEPATIC FUNCTION PANEL: CPT | Performed by: INTERNAL MEDICINE

## 2019-11-13 PROCEDURE — 84439 ASSAY OF FREE THYROXINE: CPT | Performed by: INTERNAL MEDICINE

## 2019-11-13 PROCEDURE — 81001 URINALYSIS AUTO W/SCOPE: CPT | Performed by: INTERNAL MEDICINE

## 2019-11-13 PROCEDURE — 85652 RBC SED RATE AUTOMATED: CPT | Performed by: INTERNAL MEDICINE

## 2019-11-13 PROCEDURE — 36415 COLL VENOUS BLD VENIPUNCTURE: CPT | Performed by: INTERNAL MEDICINE

## 2019-11-13 PROCEDURE — 86606 ASPERGILLUS ANTIBODY: CPT | Performed by: INTERNAL MEDICINE

## 2019-11-13 PROCEDURE — 86431 RHEUMATOID FACTOR QUANT: CPT | Performed by: INTERNAL MEDICINE

## 2019-11-13 PROCEDURE — 86200 CCP ANTIBODY: CPT | Performed by: INTERNAL MEDICINE

## 2019-11-13 PROCEDURE — 86140 C-REACTIVE PROTEIN: CPT | Performed by: INTERNAL MEDICINE

## 2019-11-13 PROCEDURE — 82550 ASSAY OF CK (CPK): CPT | Performed by: INTERNAL MEDICINE

## 2019-11-13 ASSESSMENT — MIFFLIN-ST. JEOR: SCORE: 1133.25

## 2019-11-13 ASSESSMENT — PAIN SCALES - GENERAL: PAINLEVEL: NO PAIN (0)

## 2019-11-13 NOTE — PROGRESS NOTES
HISTORY OF PRESENT ILLNESS:  The patient is an 88-year-old female with a history of seropositive rheumatoid arthritis with associated RA-ILD.  She was referred for further evaluation and management of her RA-ILD.  Patient relates a history of developing rheumatoid arthritis in the early 1970s at which time she noted shortness of breath.  Over the years she relates a history that the shortness of breath has gradually worsened.  Currently, she gets short of breath with relatively minimal activity.  Walking is difficult.  She can walk up to maybe half a block.  Things have been going relatively well for her through the summer of this year.  In September she felt worse, was hospitalized, was very weak and tired at that point in time.  According to the son, a battery of tests were done but a clear etiology why things were worse was not determined and she was discharged from the hospital.  They relate that over October her strength had started to improve; however, now the patient states that over the last several days she has felt much more fatigued and has noted some lower abdominal pain that she describes as sharp and in the lower part of the abdomen.  She thinks that this is affecting her breathing and she also feels more short of breath.  In terms of her rheumatoid arthritis, she does not relate a history of a recent flare in her rheumatoid arthritis and overall her joint symptoms have been stable.  Of note, she was seen by Rheumatology earlier this month, last week, and again things were felt to be stable in terms of her rheumatoid arthritis.      PAST MEDICAL HISTORY:      1. She is status post appendectomy and tonsillectomy.   2. She is status post hysterectomy.   3. She has rheumatoid arthritis and RA-ILD.     4. She has a history of congestive heart failure.  She has a leaky mitral valve.  She has a history of atrial fibrillation with a pacemaker in place.   5. She has basal cell carcinoma with lesions removed  from her nose.   6. Macular degeneration and glaucoma.   7. Hypothyroidism.   8. Hypertension.      MEDICATIONS:     1. Orencia IV q. 4 weeks.     2. Imuran.     3. Lasix.     4. Eliquis.     5. Coreg.     6. Prolia.     7. Hydralazine.     8. Imdur.     9. Synthroid.     10. Zantac.     11. Trimethoprim because she is prone for UTIs.     12. Multivitamin.     13. Vitamin C.      FAMILY HISTORY:  Mother had A-Fib.  Dad had COPD and  of pneumonia.  There is no family history of lung problems that she is aware of.      SOCIAL HISTORY:  She lives in an independent living facility in [place].  She is a never smoker, although was exposed to secondhand smoke through her  and father.  She worked as a seamstress.  No history of asbestos exposure.  No mold that she is aware of and no pets.      REVIEW OF SYSTEMS   CARDIAC:  She denies chest pain.  She denies a history of recent palpitations.  She does have atrial fibrillation with a pacemaker in place.  She denies worsening lower extremity edema.   PULMONARY:  Per HPI.   GASTROINTESTINAL:  As mentioned in the HPI.  She describes a lower quadrant abdominal pain since basically , about 3 days ago.  She has not noted a change in her bowel motions.  She has not noted any blood in her stool.  She did have loose stools a day or so ago which have now resolved.  She currently denies diarrhea.  The pain in her abdomen she describes as sharp and worse with movement and it has affected her appetite.  She has noted decreased appetite.   GENITOURINARY:  She denies burning on urination or hematuria, although she is prone to having UTIs.   MUSCULOSKELETAL:  She has not noticed recent change in joint symptoms.      PHYSICAL EXAMINATION:     VITAL SIGNS:  She was afebrile.  Blood pressure is 113/69.  Pulse 85.  Respiratory rate 18.  O2 sats are 98% on room air.   HEENT:  Eyes are anicteric.  Mouth and throat are without lesions.   NECK:  Supple.  No adenopathy  appreciated.   CHEST:  She had crackles bilaterally in the bases of the lungs to maybe about a third of the way up, a little bit more prominent left than right.   HEART:  Sounded regular to me, the rate.  Normal S1, S2.  Soft systolic murmur.   ABDOMEN:  No hepatosplenomegaly.  Abdomen was tender in the lower quadrant, kind of mid lower quadrant was the worse.  I did not appreciate excessive tympany or anything to suggest dilated bowel loops on examination.     EXTREMITIES:  Without significant, maybe trace edema, without clubbing or cyanosis.   JOINTS:  No active joint swelling or redness that I can see that was appreciable to me.      PULMONARY FUNCTION TESTS:  She did a 6-minute walk.  She did not desaturate with a 6-minute walk.  She started at 100% and the lowest she got was 99% during the walk, but the pace was very slow.  FEV1 was 1.19 or 69% of predicted.  FVC was 1.58 or 69% of predicted.  TLC was 3.63 or 75% of predicted and diffusion capacity was 59% of predicted.  The PFTs are consistent with mild restriction and mild diffusion defect.  She was scheduled to get a CT scan today, but was not done before the appointment so I do not have new imaging on her.  She did have a CT scan back in September which was a PE study.  It was read as having stable appearance of bibasilar peripheral predominant interstitial lung disease, most compatible with UIP pattern.  There is no CT scan evidence of any acute changes on the CT scan, so the CT scan was felt to be relatively stable.      ASSESSMENT AND PLAN:  In summary then, the patient is an 88-year-old lady with RA and RA-ILD.  In terms of her RA-ILD, I will get a repeat CT scan on her today to evaluate for comparison with prior CT scans to see whether there is any evidence of progression of her RA-ILD.  Of note, on her pulmonary function tests back in 2014 and 2015, her FVC was running about 1.5-1.6 and then in 2017 and 2018 her FVC improved to 1.8-1.9 region and then  in 12/2018 it was 1.68 and currently it is 1.58.  So dating back to 2014, there has not been a significant change, but there have been transient improvements in her forced vital capacity in 2017 in 2018 and now the FVC has trended lower, so there is a question of whether or not her ILD is progressive.  I will review the CT scans to try to see whether there is any objective worsening of the pulmonary fibrosis by CT scan.      The patient comes in today not feeling well since Sunday and is complaining of extreme fatigue at this point in time, and it has coincided with the onset of lower abdominal pain.  When I palpate her she is tender in the lower quadrants of the mid area.  I do not appreciate tympany.  Differential includes diverticulitis or a UTI.  In addition to sending a CBC, basic metabolic panel and hepatic panel on her, I will send a lipase off on her, a UA/UC, and we will get in addition to a chest CT scan an abdominal CT scan to look to see whether there is any evidence of diverticulosis/diverticulitis.  I have instructed her that she should let her primary care physician, Dr. Lockett, know that she is feeling poorly.  I plan to see the patient back in several weeks once I have an opportunity to review her CT scans and her PFTs in terms of her status of her RA-ILD.       Addendum: abdomenal CT shows sigmoid diverticulitis. Patient's son called and information relayed to him. Recommendation to notify primary MD and to be admitted to hospital for initiation of IV fluids and antibiotics.    Chest CT shows stable fibrotic changes. No GGO noted. CT consistent with RA-ILD.  Continue current treatment for RA. Stable fibrotic lung disease.    Gokul Gallegos MD     cc:   Tre Lockett MD   [facility]

## 2019-11-13 NOTE — TELEPHONE ENCOUNTER
Patient was seen today with Dr. Gallegos (Pulmonology). Dr. Gallegos has sent information about patient's summary. Arjun (son) is concerned and is wondering what are the next steps that needs to be taken.     Call back [phone]    Call anytime and okay to leave a message.     Thank you.

## 2019-11-13 NOTE — LETTER
11/13/2019       RE: Linda Spicer  [address]  [facility] [address]     Dear Colleague,    Thank you for referring your patient, Linda Spicer, to the [facility]G [facility]ENCE [facility] at [facility]. Please see a copy of my visit note below.    HISTORY OF PRESENT ILLNESS:  The patient is an 88-year-old female with a history of seropositive rheumatoid arthritis with associated RA-ILD.  She was referred for further evaluation and management of her RA-ILD.  Patient relates a history of developing rheumatoid arthritis in the early 1970s at which time she noted shortness of breath.  Over the years she relates a history that the shortness of breath has gradually worsened.  Currently, she gets short of breath with relatively minimal activity.  Walking is difficult.  She can walk up to maybe half a block.  Things have been going relatively well for her through the summer of this year.  In September she felt worse, was hospitalized, was very weak and tired at that point in time.  According to the son, a battery of tests were done but a clear etiology why things were worse was not determined and she was discharged from the hospital.  They relate that over October her strength had started to improve; however, now the patient states that over the last several days she has felt much more fatigued and has noted some lower abdominal pain that she describes as sharp and in the lower part of the abdomen.  She thinks that this is affecting her breathing and she also feels more short of breath.  In terms of her rheumatoid arthritis, she does not relate a history of a recent flare in her rheumatoid arthritis and overall her joint symptoms have been stable.  Of note, she was seen by Rheumatology earlier this month, last week, and again things were felt to be stable in terms of her rheumatoid arthritis.      PAST MEDICAL HISTORY:      1. She is status post  appendectomy and tonsillectomy.   2. She is status post hysterectomy.   3. She has rheumatoid arthritis and RA-ILD.     4. She has a history of congestive heart failure.  She has a leaky mitral valve.  She has a history of atrial fibrillation with a pacemaker in place.   5. She has basal cell carcinoma with lesions removed from her nose.   6. Macular degeneration and glaucoma.   7. Hypothyroidism.   8. Hypertension.      MEDICATIONS:     1. Orencia IV q. 4 weeks.     2. Imuran.     3. Lasix.     4. Eliquis.     5. Coreg.     6. Prolia.     7. Hydralazine.     8. Imdur.     9. Synthroid.     10. Zantac.     11. Trimethoprim because she is prone for UTIs.     12. Multivitamin.     13. Vitamin C.      FAMILY HISTORY:  Mother had A-Fib.  Dad had COPD and  of pneumonia.  There is no family history of lung problems that she is aware of.      SOCIAL HISTORY:  She lives in an independent living facility in [place].  She is a never smoker, although was exposed to secondhand smoke through her  and father.  She worked as a seamstress.  No history of asbestos exposure.  No mold that she is aware of and no pets.      REVIEW OF SYSTEMS   CARDIAC:  She denies chest pain.  She denies a history of recent palpitations.  She does have atrial fibrillation with a pacemaker in place.  She denies worsening lower extremity edema.   PULMONARY:  Per HPI.   GASTROINTESTINAL:  As mentioned in the HPI.  She describes a lower quadrant abdominal pain since basically , about 3 days ago.  She has not noted a change in her bowel motions.  She has not noted any blood in her stool.  She did have loose stools a day or so ago which have now resolved.  She currently denies diarrhea.  The pain in her abdomen she describes as sharp and worse with movement and it has affected her appetite.  She has noted decreased appetite.   GENITOURINARY:  She denies burning on urination or hematuria, although she is prone to having UTIs.    MUSCULOSKELETAL:  She has not noticed recent change in joint symptoms.      PHYSICAL EXAMINATION:     VITAL SIGNS:  She was afebrile.  Blood pressure is 113/69.  Pulse 85.  Respiratory rate 18.  O2 sats are 98% on room air.   HEENT:  Eyes are anicteric.  Mouth and throat are without lesions.   NECK:  Supple.  No adenopathy appreciated.   CHEST:  She had crackles bilaterally in the bases of the lungs to maybe about a third of the way up, a little bit more prominent left than right.   HEART:  Sounded regular to me, the rate.  Normal S1, S2.  Soft systolic murmur.   ABDOMEN:  No hepatosplenomegaly.  Abdomen was tender in the lower quadrant, kind of mid lower quadrant was the worse.  I did not appreciate excessive tympany or anything to suggest dilated bowel loops on examination.     EXTREMITIES:  Without significant, maybe trace edema, without clubbing or cyanosis.   JOINTS:  No active joint swelling or redness that I can see that was appreciable to me.      PULMONARY FUNCTION TESTS:  She did a 6-minute walk.  She did not desaturate with a 6-minute walk.  She started at 100% and the lowest she got was 99% during the walk, but the pace was very slow.  FEV1 was 1.19 or 69% of predicted.  FVC was 1.58 or 69% of predicted.  TLC was 3.63 or 75% of predicted and diffusion capacity was 59% of predicted.  The PFTs are consistent with mild restriction and mild diffusion defect.  She was scheduled to get a CT scan today, but was not done before the appointment so I do not have new imaging on her.  She did have a CT scan back in September which was a PE study.  It was read as having stable appearance of bibasilar peripheral predominant interstitial lung disease, most compatible with UIP pattern.  There is no CT scan evidence of any acute changes on the CT scan, so the CT scan was felt to be relatively stable.      ASSESSMENT AND PLAN:  In summary then, the patient is an 88-year-old lady with RA and RA-ILD.  In terms of her  RA-ILD, I will get a repeat CT scan on her today to evaluate for comparison with prior CT scans to see whether there is any evidence of progression of her RA-ILD.  Of note, on her pulmonary function tests back in 2014 and 2015, her FVC was running about 1.5-1.6 and then in 2017 and 2018 her FVC improved to 1.8-1.9 region and then in 12/2018 it was 1.68 and currently it is 1.58.  So dating back to 2014, there has not been a significant change, but there have been transient improvements in her forced vital capacity in 2017 in 2018 and now the FVC has trended lower, so there is a question of whether or not her ILD is progressive.  I will review the CT scans to try to see whether there is any objective worsening of the pulmonary fibrosis by CT scan.      The patient comes in today not feeling well since Sunday and is complaining of extreme fatigue at this point in time, and it has coincided with the onset of lower abdominal pain.  When I palpate her she is tender in the lower quadrants of the mid area.  I do not appreciate tympany.  Differential includes diverticulitis or a UTI.  In addition to sending a CBC, basic metabolic panel and hepatic panel on her, I will send a lipase off on her, a UA/UC, and we will get in addition to a chest CT scan an abdominal CT scan to look to see whether there is any evidence of diverticulosis/diverticulitis.  I have instructed her that she should let her primary care physician, Dr. Lockett, know that she is feeling poorly.  I plan to see the patient back in several weeks once I have an opportunity to review her CT scans and her PFTs in terms of her status of her RA-ILD.       Addendum: abdomenal CT shows sigmoid diverticulitis. Patient's son called and information relayed to him. Recommendation to notify primary MD and to be admitted to hospital for initiation of IV fluids and antibiotics.    Chest CT shows stable fibrotic changes.    Gokul Gallegos MD     cc:   Tre Lockett MD   [place]  [place]

## 2019-11-13 NOTE — NURSING NOTE
Chief Complaint   Patient presents with     Interstitial Lung Disease (ILD)     Consult      Medications reviewed and updated.  Vitals taken  Daphne Workman CMA

## 2019-11-14 ENCOUNTER — TELEPHONE (OUTPATIENT)
Dept: RHEUMATOLOGY | Facility: CLINIC | Age: 84
End: 2019-11-14

## 2019-11-14 ENCOUNTER — HOSPITAL ENCOUNTER (INPATIENT)
Facility: CLINIC | Age: 84
LOS: 2 days | Discharge: NURSING, REHAB OR LONG-TERM CARE | DRG: 392 | End: 2019-11-16
Attending: EMERGENCY MEDICINE | Admitting: INTERNAL MEDICINE
Payer: MEDICARE

## 2019-11-14 DIAGNOSIS — K57.32 DIVERTICULITIS OF LARGE INTESTINE WITHOUT PERFORATION OR ABSCESS WITHOUT BLEEDING: Primary | ICD-10-CM

## 2019-11-14 DIAGNOSIS — K57.32 DIVERTICULITIS OF COLON: ICD-10-CM

## 2019-11-14 DIAGNOSIS — I50.33 ACUTE ON CHRONIC DIASTOLIC HEART FAILURE (H): ICD-10-CM

## 2019-11-14 LAB
ALBUMIN SERPL-MCNC: 3.1 G/DL (ref 3.4–5)
ALBUMIN UR-MCNC: NEGATIVE MG/DL
ALDOLASE SERPL-CCNC: 3.5 U/L (ref 1.5–8.1)
ALP SERPL-CCNC: 51 U/L (ref 40–150)
ALT SERPL W P-5'-P-CCNC: 22 U/L (ref 0–50)
ANA PAT SER IF-IMP: ABNORMAL
ANA SER QL IF: ABNORMAL
ANA TITR SER IF: ABNORMAL {TITER}
ANCA AB PATTERN SER IF-IMP: NORMAL
ANION GAP SERPL CALCULATED.3IONS-SCNC: 7 MMOL/L (ref 3–14)
APPEARANCE UR: CLEAR
AST SERPL W P-5'-P-CCNC: 34 U/L (ref 0–45)
BACTERIA SPEC CULT: NORMAL
BASOPHILS # BLD AUTO: 0 10E9/L (ref 0–0.2)
BASOPHILS NFR BLD AUTO: 0.6 %
BILIRUB SERPL-MCNC: 0.6 MG/DL (ref 0.2–1.3)
BILIRUB UR QL STRIP: NEGATIVE
BUN SERPL-MCNC: 45 MG/DL (ref 7–30)
C-ANCA TITR SER IF: NORMAL {TITER}
CALCIUM SERPL-MCNC: 8.8 MG/DL (ref 8.5–10.1)
CCP AB SER IA-ACNC: 12 U/ML
CHLORIDE SERPL-SCNC: 103 MMOL/L (ref 94–109)
CO2 SERPL-SCNC: 27 MMOL/L (ref 20–32)
COLOR UR AUTO: YELLOW
CREAT SERPL-MCNC: 1.9 MG/DL (ref 0.52–1.04)
DIFFERENTIAL METHOD BLD: ABNORMAL
ENA JO1 IGG SER-ACNC: <0.2 AI (ref 0–0.9)
ENA SCL70 IGG SER IA-ACNC: <0.2 AI (ref 0–0.9)
ENA SS-A IGG SER IA-ACNC: <0.2 AI (ref 0–0.9)
ENA SS-B IGG SER IA-ACNC: <0.2 AI (ref 0–0.9)
EOSINOPHIL # BLD AUTO: 0.2 10E9/L (ref 0–0.7)
EOSINOPHIL NFR BLD AUTO: 3.5 %
ERYTHROCYTE [DISTWIDTH] IN BLOOD BY AUTOMATED COUNT: 13.2 % (ref 10–15)
GFR SERPL CREATININE-BSD FRML MDRD: 23 ML/MIN/{1.73_M2}
GLUCOSE SERPL-MCNC: 76 MG/DL (ref 70–99)
GLUCOSE UR STRIP-MCNC: NEGATIVE MG/DL
HCT VFR BLD AUTO: 35.3 % (ref 35–47)
HGB BLD-MCNC: 11.4 G/DL (ref 11.7–15.7)
HGB UR QL STRIP: NEGATIVE
HYALINE CASTS #/AREA URNS LPF: 9 /LPF (ref 0–2)
IMM GRANULOCYTES # BLD: 0 10E9/L (ref 0–0.4)
IMM GRANULOCYTES NFR BLD: 0.5 %
KETONES UR STRIP-MCNC: NEGATIVE MG/DL
LEUKOCYTE ESTERASE UR QL STRIP: ABNORMAL
LYMPHOCYTES # BLD AUTO: 0.8 10E9/L (ref 0.8–5.3)
LYMPHOCYTES NFR BLD AUTO: 12.6 %
Lab: NORMAL
MCH RBC QN AUTO: 32.9 PG (ref 26.5–33)
MCHC RBC AUTO-ENTMCNC: 32.3 G/DL (ref 31.5–36.5)
MCV RBC AUTO: 102 FL (ref 78–100)
MONOCYTES # BLD AUTO: 0.8 10E9/L (ref 0–1.3)
MONOCYTES NFR BLD AUTO: 12.6 %
MUCOUS THREADS #/AREA URNS LPF: PRESENT /LPF
NEUTROPHILS # BLD AUTO: 4.5 10E9/L (ref 1.6–8.3)
NEUTROPHILS NFR BLD AUTO: 70.2 %
NITRATE UR QL: NEGATIVE
NRBC # BLD AUTO: 0 10*3/UL
NRBC BLD AUTO-RTO: 0 /100
PH UR STRIP: 5.5 PH (ref 5–7)
PLATELET # BLD AUTO: 252 10E9/L (ref 150–450)
POTASSIUM SERPL-SCNC: 4.7 MMOL/L (ref 3.4–5.3)
PROT SERPL-MCNC: 7.6 G/DL (ref 6.8–8.8)
RBC # BLD AUTO: 3.46 10E12/L (ref 3.8–5.2)
RBC #/AREA URNS AUTO: 2 /HPF (ref 0–2)
RHEUMATOID FACT SER NEPH-ACNC: 43 IU/ML (ref 0–20)
SODIUM SERPL-SCNC: 136 MMOL/L (ref 133–144)
SOURCE: ABNORMAL
SP GR UR STRIP: 1.01 (ref 1–1.03)
SPECIMEN SOURCE: NORMAL
SQUAMOUS #/AREA URNS AUTO: 3 /HPF (ref 0–1)
TRANS CELLS #/AREA URNS HPF: <1 /HPF (ref 0–1)
UROBILINOGEN UR STRIP-MCNC: NORMAL MG/DL (ref 0–2)
WBC # BLD AUTO: 6.4 10E9/L (ref 4–11)
WBC #/AREA URNS AUTO: 6 /HPF (ref 0–5)

## 2019-11-14 PROCEDURE — 93005 ELECTROCARDIOGRAM TRACING: CPT

## 2019-11-14 PROCEDURE — 12000001 ZZH R&B MED SURG/OB UMMC

## 2019-11-14 PROCEDURE — 25000128 H RX IP 250 OP 636: Performed by: EMERGENCY MEDICINE

## 2019-11-14 PROCEDURE — 99285 EMERGENCY DEPT VISIT HI MDM: CPT | Mod: Z6 | Performed by: EMERGENCY MEDICINE

## 2019-11-14 PROCEDURE — 99285 EMERGENCY DEPT VISIT HI MDM: CPT | Mod: 25 | Performed by: EMERGENCY MEDICINE

## 2019-11-14 PROCEDURE — 25000132 ZZH RX MED GY IP 250 OP 250 PS 637: Mod: GY | Performed by: INTERNAL MEDICINE

## 2019-11-14 PROCEDURE — 85025 COMPLETE CBC W/AUTO DIFF WBC: CPT | Performed by: EMERGENCY MEDICINE

## 2019-11-14 PROCEDURE — 81001 URINALYSIS AUTO W/SCOPE: CPT | Performed by: EMERGENCY MEDICINE

## 2019-11-14 PROCEDURE — 25000128 H RX IP 250 OP 636: Performed by: STUDENT IN AN ORGANIZED HEALTH CARE EDUCATION/TRAINING PROGRAM

## 2019-11-14 PROCEDURE — 96365 THER/PROPH/DIAG IV INF INIT: CPT | Performed by: EMERGENCY MEDICINE

## 2019-11-14 PROCEDURE — 99223 1ST HOSP IP/OBS HIGH 75: CPT | Mod: AI | Performed by: INTERNAL MEDICINE

## 2019-11-14 PROCEDURE — 25000131 ZZH RX MED GY IP 250 OP 636 PS 637: Mod: GY | Performed by: STUDENT IN AN ORGANIZED HEALTH CARE EDUCATION/TRAINING PROGRAM

## 2019-11-14 PROCEDURE — 93010 ELECTROCARDIOGRAM REPORT: CPT | Performed by: INTERNAL MEDICINE

## 2019-11-14 PROCEDURE — 25000132 ZZH RX MED GY IP 250 OP 250 PS 637: Mod: GY | Performed by: STUDENT IN AN ORGANIZED HEALTH CARE EDUCATION/TRAINING PROGRAM

## 2019-11-14 PROCEDURE — 80053 COMPREHEN METABOLIC PANEL: CPT | Performed by: EMERGENCY MEDICINE

## 2019-11-14 RX ORDER — NALOXONE HYDROCHLORIDE 0.4 MG/ML
.1-.4 INJECTION, SOLUTION INTRAMUSCULAR; INTRAVENOUS; SUBCUTANEOUS
Status: DISCONTINUED | OUTPATIENT
Start: 2019-11-14 | End: 2019-11-16 | Stop reason: HOSPADM

## 2019-11-14 RX ORDER — AZATHIOPRINE 50 MG/1
50 TABLET ORAL DAILY
Status: DISCONTINUED | OUTPATIENT
Start: 2019-11-14 | End: 2019-11-16 | Stop reason: HOSPADM

## 2019-11-14 RX ORDER — HYDRALAZINE HYDROCHLORIDE 10 MG/1
20 TABLET, FILM COATED ORAL 3 TIMES DAILY
Status: CANCELLED | OUTPATIENT
Start: 2019-11-14

## 2019-11-14 RX ORDER — TRIMETHOPRIM 100 MG/1
50 TABLET ORAL DAILY
Status: DISCONTINUED | OUTPATIENT
Start: 2019-11-14 | End: 2019-11-16 | Stop reason: HOSPADM

## 2019-11-14 RX ORDER — SACCHAROMYCES BOULARDII 250 MG
250 CAPSULE ORAL DAILY
COMMUNITY
End: 2019-11-29

## 2019-11-14 RX ORDER — LIDOCAINE 40 MG/G
CREAM TOPICAL
Status: DISCONTINUED | OUTPATIENT
Start: 2019-11-14 | End: 2019-11-16 | Stop reason: HOSPADM

## 2019-11-14 RX ORDER — ONDANSETRON 4 MG/1
4 TABLET, ORALLY DISINTEGRATING ORAL EVERY 6 HOURS PRN
Status: DISCONTINUED | OUTPATIENT
Start: 2019-11-14 | End: 2019-11-16 | Stop reason: HOSPADM

## 2019-11-14 RX ORDER — ISOSORBIDE MONONITRATE 60 MG/1
60 TABLET, EXTENDED RELEASE ORAL DAILY
Status: DISCONTINUED | OUTPATIENT
Start: 2019-11-15 | End: 2019-11-16 | Stop reason: HOSPADM

## 2019-11-14 RX ORDER — ASPIRIN 81 MG/1
81 TABLET ORAL DAILY
COMMUNITY

## 2019-11-14 RX ORDER — LEVOTHYROXINE SODIUM 75 UG/1
75 TABLET ORAL DAILY
Status: DISCONTINUED | OUTPATIENT
Start: 2019-11-15 | End: 2019-11-16 | Stop reason: HOSPADM

## 2019-11-14 RX ORDER — DEXTROSE, SODIUM CHLORIDE, SODIUM LACTATE, POTASSIUM CHLORIDE, AND CALCIUM CHLORIDE 5; .6; .31; .03; .02 G/100ML; G/100ML; G/100ML; G/100ML; G/100ML
INJECTION, SOLUTION INTRAVENOUS CONTINUOUS
Status: DISCONTINUED | OUTPATIENT
Start: 2019-11-14 | End: 2019-11-15

## 2019-11-14 RX ORDER — PIPERACILLIN SODIUM, TAZOBACTAM SODIUM 2; .25 G/10ML; G/10ML
2.25 INJECTION, POWDER, LYOPHILIZED, FOR SOLUTION INTRAVENOUS EVERY 8 HOURS SCHEDULED
Status: DISCONTINUED | OUTPATIENT
Start: 2019-11-14 | End: 2019-11-15

## 2019-11-14 RX ORDER — CARVEDILOL 3.12 MG/1
3.12 TABLET ORAL 2 TIMES DAILY WITH MEALS
Status: DISCONTINUED | OUTPATIENT
Start: 2019-11-14 | End: 2019-11-16 | Stop reason: HOSPADM

## 2019-11-14 RX ORDER — PIPERACILLIN SODIUM, TAZOBACTAM SODIUM 2; .25 G/10ML; G/10ML
2.25 INJECTION, POWDER, LYOPHILIZED, FOR SOLUTION INTRAVENOUS ONCE
Status: COMPLETED | OUTPATIENT
Start: 2019-11-14 | End: 2019-11-14

## 2019-11-14 RX ORDER — DEXTROSE, SODIUM CHLORIDE, SODIUM LACTATE, POTASSIUM CHLORIDE, AND CALCIUM CHLORIDE 5; .6; .31; .03; .02 G/100ML; G/100ML; G/100ML; G/100ML; G/100ML
INJECTION, SOLUTION INTRAVENOUS CONTINUOUS
Status: CANCELLED | OUTPATIENT
Start: 2019-11-14

## 2019-11-14 RX ORDER — FAMOTIDINE 10 MG
20 TABLET ORAL DAILY
Status: DISCONTINUED | OUTPATIENT
Start: 2019-11-14 | End: 2019-11-16 | Stop reason: HOSPADM

## 2019-11-14 RX ORDER — ACETAMINOPHEN 325 MG/1
650 TABLET ORAL EVERY 4 HOURS PRN
Status: DISCONTINUED | OUTPATIENT
Start: 2019-11-14 | End: 2019-11-16 | Stop reason: HOSPADM

## 2019-11-14 RX ORDER — ONDANSETRON 2 MG/ML
4 INJECTION INTRAMUSCULAR; INTRAVENOUS EVERY 6 HOURS PRN
Status: DISCONTINUED | OUTPATIENT
Start: 2019-11-14 | End: 2019-11-16 | Stop reason: HOSPADM

## 2019-11-14 RX ADMIN — APIXABAN 2.5 MG: 2.5 TABLET, FILM COATED ORAL at 20:20

## 2019-11-14 RX ADMIN — CARVEDILOL 3.12 MG: 3.12 TABLET, FILM COATED ORAL at 17:07

## 2019-11-14 RX ADMIN — AZATHIOPRINE 50 MG: 50 TABLET ORAL at 17:07

## 2019-11-14 RX ADMIN — PIPERACILLIN SODIUM AND TAZOBACTAM SODIUM 2.25 G: .25; 2 INJECTION, POWDER, LYOPHILIZED, FOR SOLUTION INTRAVENOUS at 14:34

## 2019-11-14 RX ADMIN — FAMOTIDINE 20 MG: 10 TABLET, FILM COATED ORAL at 18:30

## 2019-11-14 RX ADMIN — SODIUM CHLORIDE, SODIUM LACTATE, POTASSIUM CHLORIDE, CALCIUM CHLORIDE AND DEXTROSE MONOHYDRATE: 5; 600; 310; 30; 20 INJECTION, SOLUTION INTRAVENOUS at 23:01

## 2019-11-14 RX ADMIN — PIPERACILLIN SODIUM AND TAZOBACTAM SODIUM 2.25 G: 2; .25 INJECTION, POWDER, LYOPHILIZED, FOR SOLUTION INTRAVENOUS at 22:19

## 2019-11-14 RX ADMIN — Medication 50 MG: at 17:07

## 2019-11-14 ASSESSMENT — ENCOUNTER SYMPTOMS
EYE REDNESS: 0
FATIGUE: 1
WEAKNESS: 1
CONFUSION: 0
SHORTNESS OF BREATH: 1
ABDOMINAL PAIN: 1
CONSTIPATION: 0
FEVER: 0
NECK STIFFNESS: 0
APPETITE CHANGE: 1
DIFFICULTY URINATING: 0
COLOR CHANGE: 0
ARTHRALGIAS: 0
DIARRHEA: 0
BLOOD IN STOOL: 0
HEADACHES: 0

## 2019-11-14 ASSESSMENT — MIFFLIN-ST. JEOR: SCORE: 1120.57

## 2019-11-14 ASSESSMENT — ACTIVITIES OF DAILY LIVING (ADL): ADLS_ACUITY_SCORE: 17

## 2019-11-14 NOTE — TELEPHONE ENCOUNTER
Spoke with patient's son, Arjun, who stated his mom will be starting IV antibiotics for diverticulitis. He is wondering if she should stop her Remicaide infusions. RN discussed that Dr. Davenport wants patient to hold Remicaide until antibiotic course is finished and there is no sign of infection. Advised to wait until 3 days after antibiotic course to resume infusion. Arjun verbalized understanding and will call if he has any questions.    (c2c on file)    Nhan Patel RN....11/14/2019 8:48 AM

## 2019-11-14 NOTE — TELEPHONE ENCOUNTER
I spoke with patient's son (Arjun) today.  Linda was admitted today due to sigmoid diverticulitis.

## 2019-11-14 NOTE — ED PROVIDER NOTES
"    [facility] EMERGENCY DEPARTMENT ([facility])  11/14/19    History     Chief Complaint   Patient presents with     Abdominal Pain     The history is provided by the patient, a relative and medical records.     Linda Spicer is a 88 year old female with a past medical history significant for seropositive rheumatoid arthritis, RA-ILD, CKD3, COPD, CHF, [employer] ventricular pacemaker (2017), atrial fibrillation, s/p mitral valve clip implantation (2/11/2019), HTN and hypothyroidism who presents here to the Emergency Department due to lower abdominal pain.    Per chart review patient saw her pulmonologist, Dr. Gallegos, yesterday and it was noted at that time that she had sharp lower abdominal pain. Patient had also reported that she had felt fatigued over the past several days. Patient also endorsed more shortness of breath. Patient had a CT of the abdomen done that showed sigmoid diverticulitis. It was recommended that she notify her PCP and be admitted to the hospital for initiation of IV fluids and antibiotics.    Here in the ED patient is reporting that she has been experiencing lower abdominal pain since Sunday (11/10/2019). Patient reports that her pain was worse Monday-Tuesday, got slightly better yesterday, and was even a little better today. Patient has also been endorsing generalized weakness and fatigue. Has had worsening shortness of breath brought on by the worse abdominal pain. Reports that 11/10-11/13 she was only able to change her clothes prior to having to rest. Sates that today she was able to get breakfast and change her clothes prior to resting. Patient notes normal BMs on 11/10/2019, \"formed but different stools\" 11/11-11/12 and have had resolution of her stools the past 2 days. Notes a \"little\" less diet than normal. Patient denies fevers, or black/bloody stools. Patient is on Remicade for her RA.    I have reviewed the Medications, Allergies, Past Medical and Surgical History, and Social " History in the [employer] system.    Past Medical History:   Diagnosis Date     Adjustment disorder with anxious mood 5/18/2015     Advanced directives, counseling/discussion 8/30/2012    Patient states has Advance Directive and will bring in a copy to clinic. 8/30/2012   Tevin Chavisritt  [facility] Medical Assistant \       Anemia 9/25/2015     Basal cell cancer      CHF (congestive heart failure) (H) 9/18/2014     CKD (chronic kidney disease) stage 3, GFR 30-59 ml/min (H) 9/29/2015     DDD (degenerative disc disease), lumbar 9/25/2015     Diffuse idiopathic pulmonary fibrosis (H) 5/6/2013     Encounter for palliative care 5/18/2015     History of blood transfusion 9/29/2015     Hypertension goal BP (blood pressure) < 140/90 9/7/2012     Hypothyroid 9/7/2012     Irritable bowel syndrome 10/29/2013     Macular degeneration      Macular degeneration, 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     Nondisplaced spiral fracture of shaft of humerus      Osteoporosis 8/13/2013     Imo Update utility     RA (rheumatoid arthritis) (H) 5/7/2013     Rheumatic fever      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 2/20/2019     Shingles      Spinal stenosis of lumbar region with neurogenic claudication 9/14/2015       Past Surgical History:   Procedure Laterality Date     ANESTHESIA CARDIOVERSION N/A 9/14/2018    Procedure: ANESTHESIA CARDIOVERSION;;  Surgeon: GENERIC ANESTHESIA PROVIDER;  Location: [facility] OR     ANESTHESIA CARDIOVERSION N/A 10/11/2018    Procedure: ANESTHESIA CARDIOVERSION;  Cardioversion;  Surgeon: GENERIC ANESTHESIA PROVIDER;  Location: [facility] OR     ANESTHESIA CARDIOVERSION N/A 10/16/2018    Procedure: Anesthesia Cardioversion ;  Surgeon: GENERIC ANESTHESIA PROVIDER;  [facility]     APPENDECTOMY       BIOPSY      hemorrhoidectomy     CV HEART CATHETERIZATION WITH POSSIBLE INTERVENTION N/A 1/31/2019    Procedure: CORS,LHC,RHC-YOLANDA BEFORE CATH APPOINTMENT;  Surgeon: Avila Watson MD;  Location:  HEART CARDIAC CATH LAB     CV MITRACLIP N/A  2/11/2019    Procedure: Mitraclip Procedure;  Surgeon: Avila Watson MD;  Location: [facility] OR     ENT SURGERY      tonsillectomy     GYN SURGERY      3 D & C's     HYSTERECTOMY, PAP NO LONGER INDICATED       LAMINECTOMY LUMBAR ONE LEVEL N/A 10/13/2015    Procedure: LAMINECTOMY LUMBAR ONE LEVEL;  Surgeon: Fransico Toussaint MD;  Location: [facility] OR       Family History   Problem Relation Age of Onset     Hypertension Mother      Psychotic Disorder Father      Diabetes Son      Diabetes Daughter      Blood Disease Daughter        Social History     Tobacco Use     Smoking status: Never Smoker     Smokeless tobacco: Never Used   Substance Use Topics     Alcohol use: Yes     Comment: rare wine        Current Facility-Administered Medications   Medication     acetaminophen (TYLENOL) tablet 650 mg     apixaban ANTICOAGULANT (ELIQUIS) tablet 2.5 mg     azaTHIOprine (IMURAN) tablet 50 mg     [START ON 11/15/2019] calcium carbonate-vitamin D (OSCAL w/D) per tablet 1 tablet     carvedilol (COREG) tablet 3.125 mg     isosorbide mononitrate (IMDUR) 24 hr tablet 60 mg     levothyroxine (SYNTHROID/LEVOTHROID) tablet 75 mcg     melatonin tablet 1 mg     naloxone (NARCAN) injection 0.1-0.4 mg     ondansetron (ZOFRAN-ODT) ODT tab 4 mg    Or     ondansetron (ZOFRAN) injection 4 mg     Patient is already receiving anticoagulation with heparin, enoxaparin (LOVENOX), warfarin (COUMADIN)  or other anticoagulant medication     ranitidine (ZANTAC) tablet 150 mg     trimethoprim (TRIMPEX) half-tab 50 mg     Current Outpatient Medications   Medication     apixaban ANTICOAGULANT (ELIQUIS ANTICOAGULANT) 2.5 MG tablet     aspirin 81 MG EC tablet     azaTHIOprine (IMURAN) 50 MG tablet     calcium carbonate-vitamin D (OSCAL W/D) 500-200 MG-UNIT tablet     carvedilol (COREG) 3.125 MG tablet     COMPOUNDED NON-CONTROLLED SUBSTANCE (CMPD RX) - PHARMACY TO MIX COMPOUNDED MEDICATION     Cranberry 400 MG TABS     denosumab (PROLIA) 60 MG/ML SOLN  injection     fish oil-omega-3 fatty acids 1000 MG capsule     furosemide (LASIX) 20 MG tablet     hydrALAZINE (APRESOLINE) 10 MG tablet     isosorbide mononitrate (IMDUR) 60 MG 24 hr tablet     levothyroxine (SYNTHROID/LEVOTHROID) 75 MCG tablet     Multiple Vitamins-Minerals (PRESERVISION AREDS) CAPS     nitroGLYcerin (NITROSTAT) 0.4 MG sublingual tablet     potassium chloride ER (K-DUR/KLOR-CON M) 20 MEQ CR tablet     ranitidine (ZANTAC) 150 MG tablet     saccharomyces boulardii (FLORASTOR) 250 MG capsule     senna-docusate (SENOKOT-S/PERICOLACE) 8.6-50 MG tablet     trimethoprim (TRIMPEX) 100 MG tablet     vitamin C (ASCORBIC ACID) 250 MG tablet     order for DME     order for DME     order for DME     order for DME        Allergies   Allergen Reactions     Cephalexin Diarrhea and GI Disturbance     Other reaction(s): GI Upset       Cephalexin Hcl Diarrhea     Gabapentin Other (See Comments)     Dizzsiness  Shaky, dizzy, dry mouth  Dizzsiness  Shaky,dry mouth       Methotrexate Other (See Comments)     Depleted Folic Acid  And caused severe leg cramps  Severe leg cramps     Naproxen GI Disturbance, Other (See Comments) and Nausea     Constipation. Tolerates ibuprofen.       Perfume      Sulfa Drugs Shortness Of Breath     Throat swelling     Alprazolam Other (See Comments)     Dizziness      Levofloxacin GI Disturbance     Macrobid [Nitrofurantoin Anhydrous]      Possibly related to lung disease      Nitrofurantoin      Possibly related to lung disease      Seasonal Allergies      Ciprofloxacin Itching and Rash         Review of Systems   Constitutional: Positive for appetite change and fatigue. Negative for fever.   HENT: Negative for congestion.    Eyes: Negative for redness.   Respiratory: Positive for shortness of breath.    Cardiovascular: Negative for chest pain.   Gastrointestinal: Positive for abdominal pain (Lower). Negative for blood in stool, constipation and diarrhea.   Genitourinary: Negative for  "difficulty urinating.   Musculoskeletal: Negative for arthralgias and neck stiffness.   Skin: Negative for color change.   Neurological: Positive for weakness (Generalized). Negative for headaches.   Psychiatric/Behavioral: Negative for confusion.       Physical Exam   BP: 126/64  Pulse: 83  Temp: 97.9  F (36.6  C)  Resp: 16  Height: 161.3 cm (5' 3.5\")  SpO2: 97 %      Physical Exam  Constitutional:       Appearance: She is ill-appearing. She is not diaphoretic.   HENT:      Head: Atraumatic.      Mouth/Throat:      Pharynx: No oropharyngeal exudate.   Eyes:      General: No scleral icterus.     Pupils: Pupils are equal, round, and reactive to light.   Cardiovascular:      Heart sounds: Normal heart sounds.   Pulmonary:      Effort: Tachypnea present.      Breath sounds: Examination of the right-lower field reveals rales. Examination of the left-lower field reveals rales. Rales present.   Abdominal:      General: Bowel sounds are normal.      Palpations: Abdomen is soft.      Tenderness: There is abdominal tenderness in the right lower quadrant, suprapubic area and left lower quadrant. There is no guarding or rebound.      Comments: Significant tenderness across the low abdomen, worse in the left but no guarding or rebound   Musculoskeletal:         General: No tenderness.   Skin:     General: Skin is warm.      Findings: No rash.         ED Course   12:01 PM  The patient was seen and examined by Chapincito Harris MD in Room [facility].        Procedures             Critical Care time:  none             Labs Ordered and Resulted from Time of ED Arrival Up to the Time of Departure from the ED   CBC WITH PLATELETS DIFFERENTIAL - Abnormal; Notable for the following components:       Result Value    RBC Count 3.46 (*)     Hemoglobin 11.4 (*)      (*)     All other components within normal limits   COMPREHENSIVE METABOLIC PANEL - Abnormal; Notable for the following components:    Urea Nitrogen 45 (*)     Creatinine 1.90 " (*)     GFR Estimate 23 (*)     GFR Estimate If Black 27 (*)     Albumin 3.1 (*)     All other components within normal limits   ROUTINE UA WITH MICROSCOPIC - Abnormal; Notable for the following components:    Leukocyte Esterase Urine Large (*)     WBC Urine 6 (*)     Squamous Epithelial /HPF Urine 3 (*)     Mucous Urine Present (*)     Hyaline Casts 9 (*)     All other components within normal limits   GLUCOSE MONITOR NURSING POCT   PERIPHERAL IV CATHETER   NO VTE PROPHYLAXIS   IP ASSIGN PROVIDER TEAM TO TREATMENT TEAM   VITAL SIGNS   PERIPHERAL IV CATHETER   PULSE OXIMETRY NURSING            Assessments & Plan (with Medical Decision Making)   The patient has diverticulitis which is exacerbating her interstitial lung disease.  She is immunosuppressed as part of her treatment for ILD and rheumatoid arthritis.  She was started on Zosyn and will be admitted to the internal service.  Her CT yesterday showed the diverticulitis and even though her symptoms are mildly improved I am concerned with how tender her abdomen is.  She does not have signs of a surgical abdomen but clearly will not be able to eat or drink well and has mild tachypnea making it difficult to move around at home.  She would benefit from time in the hospital, awaiting improvement with antibiotics.    I have reviewed the nursing notes.    I have reviewed the findings, diagnosis, plan and need for follow up with the patient.    New Prescriptions    No medications on file       Final diagnoses:   Diverticulitis of colon     IDavid, am serving as a trained medical scribe to document services personally performed by Chapincito Harris MD, based on the provider's statements to me.   IChapincito MD, was physically present and have reviewed and verified the accuracy of this note documented by David Snowden.    11/14/2019   [facility], [facility], EMERGENCY DEPARTMENT     Chapincito Harris MD  11/14/19 [id number]

## 2019-11-14 NOTE — ED NOTES
[facility], [place]   ED Nurse to Floor Handoff     Linda Spicer is a 88 year old female who speaks English and lives alone,  in a home (independent living center)  They arrived in the ED by car from home    ED Chief Complaint: Abdominal Pain    ED Dx;   Final diagnoses:   Diverticulitis of colon         Needed?: No    Allergies:   Allergies   Allergen Reactions     Cephalexin Diarrhea and GI Disturbance     Other reaction(s): GI Upset       Cephalexin Hcl Diarrhea     Gabapentin Other (See Comments)     Dizzsiness  Shaky, dizzy, dry mouth  Dizzsiness  Shaky,dry mouth       Methotrexate Other (See Comments)     Depleted Folic Acid  And caused severe leg cramps  Severe leg cramps     Naproxen GI Disturbance, Other (See Comments) and Nausea     Constipation. Tolerates ibuprofen.       Perfume      Sulfa Drugs Shortness Of Breath     Throat swelling     Alprazolam Other (See Comments)     Dizziness      Levofloxacin GI Disturbance     Macrobid [Nitrofurantoin Anhydrous]      Possibly related to lung disease      Nitrofurantoin      Possibly related to lung disease      Seasonal Allergies      Ciprofloxacin Itching and Rash   .  Past Medical Hx:   Past Medical History:   Diagnosis Date     Adjustment disorder with anxious mood 5/18/2015     Advanced directives, counseling/discussion 8/30/2012    Patient states has Advance Directive and will bring in a copy to clinic. 8/30/2012   Tevin May  [facility] Medical Assistant \       Anemia 9/25/2015     Basal cell cancer      CHF (congestive heart failure) (H) 9/18/2014     CKD (chronic kidney disease) stage 3, GFR 30-59 ml/min (H) 9/29/2015     DDD (degenerative disc disease), lumbar 9/25/2015     Diffuse idiopathic pulmonary fibrosis (H) 5/6/2013     Encounter for palliative care 5/18/2015     History of blood transfusion 9/29/2015     Hypertension goal BP (blood pressure) < 140/90 9/7/2012     Hypothyroid 9/7/2012     Irritable  bowel syndrome 10/29/2013     Macular degeneration      Macular degeneration, 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     Nondisplaced spiral fracture of shaft of humerus      Osteoporosis 8/13/2013     Imo Update utility     RA (rheumatoid arthritis) (H) 5/7/2013     Rheumatic fever      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 2/20/2019     Shingles      Spinal stenosis of lumbar region with neurogenic claudication 9/14/2015      Baseline Mental status: WDL  Current Mental Status changes: at basesline    Infection present or suspected this encounter: no  Sepsis suspected: No  Isolation type: No active isolations     Activity level - Baseline/Home:  Stand with Assist  Activity Level - Current:   Stand with Assist    Bariatric equipment needed?: No    In the ED these meds were given:   Medications   piperacillin-tazobactam (ZOSYN) 2.25 g vial to attach to  ml bag (2.25 g Intravenous New Bag 11/14/19 1434)       Drips running?  Yes    Home pump  No    Current LDAs  Peripheral IV 11/14/19 Left Hand (Active)   Site Assessment [facility] 11/14/2019  1:49 PM   Number of days: 0       Wound 02/11/19 Left;Upper Lip Laceration (Active)   Number of days: 276       Incision/Surgical Site 10/13/15 Midline;Posterior Back (Active)   Number of days: 1493       Labs results:   Labs Ordered and Resulted from Time of ED Arrival Up to the Time of Departure from the ED   CBC WITH PLATELETS DIFFERENTIAL - Abnormal; Notable for the following components:       Result Value    RBC Count 3.46 (*)     Hemoglobin 11.4 (*)      (*)     All other components within normal limits   COMPREHENSIVE METABOLIC PANEL - Abnormal; Notable for the following components:    Urea Nitrogen 45 (*)     Creatinine 1.90 (*)     GFR Estimate 23 (*)     GFR Estimate If Black 27 (*)     Albumin 3.1 (*)     All other components within normal limits   ROUTINE UA WITH MICROSCOPIC - Abnormal; Notable for the following components:    Leukocyte Esterase Urine Large (*)     WBC Urine 6  "(*)     Squamous Epithelial /HPF Urine 3 (*)     Mucous Urine Present (*)     Hyaline Casts 9 (*)     All other components within normal limits   PERIPHERAL IV CATHETER       Imaging Studies: No results found for this or any previous visit (from the past 24 hour(s)).    Recent vital signs:   /64   Pulse 83   Temp 97.9  F (36.6  C) (Oral)   Resp 16   Ht 1.613 m (5' 3.5\")   LMP  (LMP Unknown)   SpO2 97%   BMI 27.60 kg/m      Yohan Coma Scale Score: 15 (11/14/19 1135)       Cardiac Rhythm: Normal Sinus  Pt needs tele? No  Skin/wound Issues: None    Code Status: DNR/DNI    Pain control: pt had none    Nausea control: pt had none    Abnormal labs/tests/findings requiring intervention: NA    Family present during ED course? Yes   Family Comments/Social Situation comments: NA    Tasks needing completion: None    Samira Alcantara, RN    [phone] [facility]    "

## 2019-11-14 NOTE — PHARMACY-ADMISSION MEDICATION HISTORY
Admission medication history interview status for the 11/14/2019 admission is complete. See Epic admission navigator for allergy information, pharmacy, prior to admission medications and immunization status.     Medication history interview sources:  Patient, dispense report, and Care Everywhere    Changes made to PTA medication list (reason)  Added: Aspirin 81mg, Florastor probiotic, and calcium-vitamin D  Deleted: None  Changed:   -Compounded estriol cream changed to every other day administration to reflect how patient is taking  -Potassium chloride changed to 1 tablet twice daily     Additional medication history information:  -Patient is a good historian and had a hard copy medication list to reference during the encounter  -According to notes in Epic, she last received her denosumab injection on 6/20/19    Prior to Admission medications    Medication Sig Last Dose Taking? Auth Provider   apixaban ANTICOAGULANT (ELIQUIS ANTICOAGULANT) 2.5 MG tablet Take 1 tablet (2.5 mg) by mouth 2 times daily 11/14/2019 at am Yes Emeterio Loza MD   aspirin 81 MG EC tablet Take 81 mg by mouth daily 11/14/2019 at am Yes Unknown, Entered By History   azaTHIOprine (IMURAN) 50 MG tablet Take 1 tablet (50 mg) by mouth daily 11/13/2019 at pm Yes Mario Davenport MD   calcium carbonate-vitamin D (OSCAL W/D) 500-200 MG-UNIT tablet Take 1 tablet by mouth daily and an additional tablet every other day 11/14/2019 at Unknown time Yes Unknown, Entered By History   carvedilol (COREG) 3.125 MG tablet Take 1 tablet (3.125 mg) by mouth 2 times daily (with meals) 11/14/2019 at am Yes Minna Gallagher APRN CNP   COMPOUNDED NON-CONTROLLED SUBSTANCE (CMPD RX) - PHARMACY TO MIX COMPOUNDED MEDICATION Estriol 1mg/gram. Place 1 gram vaginally daily for 2 weeks. Then vaginally twice weekly  Patient taking differently: Place 1 g vaginally every 48 hours Estriol 1mg/gram cream 11/13/2019 at Unknown time Yes Asia Steven PA-C   Cranberry  400 MG TABS Take 1 tablet by mouth daily 11/14/2019 at am Yes Reported, Patient   denosumab (PROLIA) 60 MG/ML SOSY injection Inject 60 mg Subcutaneous every 6 months 6/20/2019 Yes Unknown, Entered By History   fish oil-omega-3 fatty acids 1000 MG capsule Take 1 g by mouth 2 times daily 11/14/2019 at am Yes Reported, Patient   furosemide (LASIX) 20 MG tablet Take 2 tablets (40 mg) by mouth 2 times daily 11/14/2019 at am Yes Karla Mabry NP   hydrALAZINE (APRESOLINE) 10 MG tablet Take 2 tablets (20 mg) by mouth 3 times daily 11/14/2019 at am Yes Karla Mabry NP   isosorbide mononitrate (IMDUR) 60 MG 24 hr tablet Take 1 tablet (60 mg) by mouth daily 11/14/2019 at am Yes Karla Mabry NP   levothyroxine (SYNTHROID/LEVOTHROID) 75 MCG tablet TAKE 1 TABLET BY MOUTH EVERY DAY 11/14/2019 at am Yes Tre Lockett MD   Multiple Vitamins-Minerals (PRESERVISION AREDS) CAPS Take 1 capsule by mouth 2 times daily 11/14/2019 at am Yes Isaiah Hamilton MD   nitroGLYcerin (NITROSTAT) 0.4 MG sublingual tablet Place 1 tablet (0.4 mg) under the tongue every 5 minutes as needed for chest pain  at prn Yes Mariola Angeles MD   potassium chloride ER (K-DUR/KLOR-CON M) 20 MEQ CR tablet Take 2 tablets (40 mEq) by mouth daily  Patient taking differently: Take 20 mEq by mouth 2 times daily  11/14/2019 at am Yes Karla Mabry NP   ranitidine (ZANTAC) 150 MG tablet Take 150 mg by mouth daily 11/13/2019 at pm Yes Reported, Patient   saccharomyces boulardii (FLORASTOR) 250 MG capsule Take 250 mg by mouth daily 11/13/2019 at pm Yes Unknown, Entered By History   senna-docusate (SENOKOT-S/PERICOLACE) 8.6-50 MG tablet Take 1 tablet by mouth daily as needed for constipation  at prn Yes Tre Lockett MD   trimethoprim (TRIMPEX) 100 MG tablet Take 0.5 tablets (50 mg) by mouth daily 11/13/2019 at pm Yes Lei Martins MD   vitamin C (ASCORBIC ACID) 250 MG tablet Take 250 mg by mouth daily 11/13/2019 at  pm Yes Reported, Patient   order for DME Dispense SP Tre Pate MD   order for DME Equipment being ordered: Dispense baffle, for use with nebulizer.   Tre Lockett MD   order for DME Equipment being ordered: Nebulizer. Use with Albuterol.   Tre Lockett MD   order for DME Equipment being ordered: Dispense face mask.  Mrs. Spicer is immunosuppressed due to rheumatoid arthritis.   Tre Lockett MD       Medication history completed by:   Vita Pan  PharmD Candidate  November 14, 2019

## 2019-11-14 NOTE — ED TRIAGE NOTES
Pt presents to triage with c/o lower abdominal pain. Pt was seen yesterday in clinic and outpatient CT scan which reportedly showed diverticulitis. Pt reports she is feeling improved from yesterday but was instructed to come to the ED.

## 2019-11-14 NOTE — TELEPHONE ENCOUNTER
Reason for Call:  Other     Detailed comments: Patient is going to have an antibiotic IV today for diverticulitis and also she is having an infusion for arthritis and asking if there would be any conflict with having both these medications?    Phone Number Patient can be reached at: Cell number on file:    Telephone Information:   Mobile [phone]       Best Time: any    Can we leave a detailed message on this number? YES    Call taken on 11/14/2019 at 8:25 AM by Marilia Wright

## 2019-11-15 LAB
ANION GAP SERPL CALCULATED.3IONS-SCNC: 7 MMOL/L (ref 3–14)
BUN SERPL-MCNC: 37 MG/DL (ref 7–30)
CALCIUM SERPL-MCNC: 8.2 MG/DL (ref 8.5–10.1)
CHLORIDE SERPL-SCNC: 108 MMOL/L (ref 94–109)
CO2 SERPL-SCNC: 26 MMOL/L (ref 20–32)
CREAT SERPL-MCNC: 1.63 MG/DL (ref 0.52–1.04)
ERYTHROCYTE [DISTWIDTH] IN BLOOD BY AUTOMATED COUNT: 13.2 % (ref 10–15)
GFR SERPL CREATININE-BSD FRML MDRD: 28 ML/MIN/{1.73_M2}
GLUCOSE SERPL-MCNC: 83 MG/DL (ref 70–99)
HCT VFR BLD AUTO: 32.1 % (ref 35–47)
HGB BLD-MCNC: 10.4 G/DL (ref 11.7–15.7)
IGG SERPL-MCNC: 999 MG/DL (ref 695–1620)
IGG1 SER-MCNC: 591 MG/DL (ref 382–929)
IGG2 SER-MCNC: 306 MG/DL (ref 242–700)
IGG3 SER-MCNC: 40 MG/DL (ref 22–176)
IGG4 SER-MCNC: 36 MG/DL (ref 4–86)
INTERPRETATION ECG - MUSE: NORMAL
MCH RBC QN AUTO: 32.8 PG (ref 26.5–33)
MCHC RBC AUTO-ENTMCNC: 32.4 G/DL (ref 31.5–36.5)
MCV RBC AUTO: 101 FL (ref 78–100)
PLATELET # BLD AUTO: 227 10E9/L (ref 150–450)
POTASSIUM SERPL-SCNC: 3.9 MMOL/L (ref 3.4–5.3)
RBC # BLD AUTO: 3.17 10E12/L (ref 3.8–5.2)
SODIUM SERPL-SCNC: 141 MMOL/L (ref 133–144)
WBC # BLD AUTO: 4 10E9/L (ref 4–11)

## 2019-11-15 PROCEDURE — 36415 COLL VENOUS BLD VENIPUNCTURE: CPT | Performed by: INTERNAL MEDICINE

## 2019-11-15 PROCEDURE — 25000131 ZZH RX MED GY IP 250 OP 636 PS 637: Mod: GY | Performed by: STUDENT IN AN ORGANIZED HEALTH CARE EDUCATION/TRAINING PROGRAM

## 2019-11-15 PROCEDURE — 80048 BASIC METABOLIC PNL TOTAL CA: CPT | Performed by: INTERNAL MEDICINE

## 2019-11-15 PROCEDURE — 25000132 ZZH RX MED GY IP 250 OP 250 PS 637: Mod: GY | Performed by: STUDENT IN AN ORGANIZED HEALTH CARE EDUCATION/TRAINING PROGRAM

## 2019-11-15 PROCEDURE — 25000128 H RX IP 250 OP 636: Performed by: STUDENT IN AN ORGANIZED HEALTH CARE EDUCATION/TRAINING PROGRAM

## 2019-11-15 PROCEDURE — 99233 SBSQ HOSP IP/OBS HIGH 50: CPT | Performed by: INTERNAL MEDICINE

## 2019-11-15 PROCEDURE — 12000001 ZZH R&B MED SURG/OB UMMC

## 2019-11-15 PROCEDURE — 25000132 ZZH RX MED GY IP 250 OP 250 PS 637: Mod: GY | Performed by: INTERNAL MEDICINE

## 2019-11-15 PROCEDURE — 85027 COMPLETE CBC AUTOMATED: CPT | Performed by: INTERNAL MEDICINE

## 2019-11-15 RX ORDER — AMOXICILLIN AND CLAVULANATE POTASSIUM 500; 125 MG/1; MG/1
1 TABLET, FILM COATED ORAL EVERY 12 HOURS SCHEDULED
Status: DISCONTINUED | OUTPATIENT
Start: 2019-11-15 | End: 2019-11-16 | Stop reason: HOSPADM

## 2019-11-15 RX ADMIN — APIXABAN 2.5 MG: 2.5 TABLET, FILM COATED ORAL at 09:05

## 2019-11-15 RX ADMIN — AZATHIOPRINE 50 MG: 50 TABLET ORAL at 09:05

## 2019-11-15 RX ADMIN — AMOXICILLIN AND CLAVULANATE POTASSIUM 1 TABLET: 500; 125 TABLET, FILM COATED ORAL at 20:07

## 2019-11-15 RX ADMIN — FAMOTIDINE 20 MG: 10 TABLET, FILM COATED ORAL at 09:07

## 2019-11-15 RX ADMIN — ISOSORBIDE MONONITRATE 60 MG: 60 TABLET, EXTENDED RELEASE ORAL at 17:48

## 2019-11-15 RX ADMIN — Medication 50 MG: at 17:48

## 2019-11-15 RX ADMIN — PIPERACILLIN SODIUM AND TAZOBACTAM SODIUM 2.25 G: 2; .25 INJECTION, POWDER, LYOPHILIZED, FOR SOLUTION INTRAVENOUS at 14:29

## 2019-11-15 RX ADMIN — PIPERACILLIN SODIUM AND TAZOBACTAM SODIUM 2.25 G: 2; .25 INJECTION, POWDER, LYOPHILIZED, FOR SOLUTION INTRAVENOUS at 05:47

## 2019-11-15 RX ADMIN — OYSTER SHELL CALCIUM WITH VITAMIN D 1 TABLET: 500; 200 TABLET, FILM COATED ORAL at 09:05

## 2019-11-15 RX ADMIN — CARVEDILOL 3.12 MG: 3.12 TABLET, FILM COATED ORAL at 09:05

## 2019-11-15 RX ADMIN — LEVOTHYROXINE SODIUM 75 MCG: 75 TABLET ORAL at 09:04

## 2019-11-15 RX ADMIN — APIXABAN 2.5 MG: 2.5 TABLET, FILM COATED ORAL at 20:07

## 2019-11-15 RX ADMIN — CARVEDILOL 3.12 MG: 3.12 TABLET, FILM COATED ORAL at 17:48

## 2019-11-15 ASSESSMENT — ACTIVITIES OF DAILY LIVING (ADL)
ADLS_ACUITY_SCORE: 15

## 2019-11-15 ASSESSMENT — MIFFLIN-ST. JEOR: SCORE: 1116.94

## 2019-11-15 NOTE — PLAN OF CARE
Shift 8338-8276: Admitted for diverticulitis flare-up. AOx4. VSS on RA. Pt independent with walker. No c/o nausea, pain with deep palpation and muscle use of abdomen but no pain at rest. PIV in Left hand running D5LR at 100ml/hr. Pt on IV Zosyn. GI/ WDL. Skin intact with no notable wounds or sores. Pt uses hearing aids and glasses. Will continue to monitor and follow POC.

## 2019-11-15 NOTE — PROGRESS NOTES
General acute hospital, [place]    Progress Note - Maci 2 Service        Date of Admission:  11/14/2019    Assessment & Plan   Linda Spicer is a 88 year old female admitted on 11/14/2019. She has a history of seropositive rheumatoid arthritis with associated RA-ILD, HTN, CKD3, HFpEF (EF of 60-65% on Echo 09/25/19), tachy/bradycardia syndrome s/p permanent pacemaker, Aflutter/pAF, MR s/p mitral clip placement, and hypothyroidism and is admitted for diverticulitis.     Sigmoid diverticulitis in immunosuppressed host  Presented with diffuse lower abdominal pain, found to have sigmoid diverticulitis on CT without sign of perforation. No concern for perforation on physical exam. No clinical sepsis, no leukocytosis, and pain is improved. However, will closely observe patient as she is on immunosuppressants and might not mount obvious inflammatory response.  Has been afebrile. Abdominal pain is improved and tolerating diet.  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 BUN:Cr > 20:1. Most likely pre-renal in the setting of acute intraabdominal infection with poor oral intake. Cr improved after hydration.  - I/O. Daily weight. Trend BMP.  - Encourage PO intake  - Continue PTA calcium carbonate-vitamin D daily     Seropositive RA with associated RA-ILD  Has worsening SOB and slightly increased work of breathing but does not require oxygen. No fever or increased sputum production that suggest ILD flare or pneumonia. Also haveno sign of hypervolemia. CT showed no significant changes in UIP pattern and revealed no consolidation. This is likely secondary to acute illness. Breathing is better today. No oxygen requirement.  - Continue PTA Trimethoprim 50  mg/day  - Continue PTA Azathioprine 50 mg/day  - Monitor respiratory status     HFpEF  Tachy/bradycardia syndrome s/p permanent pacemaker  Aflutter/pAF on anticoagulant  MR s/p mitral clip placement  No recent chest pain, PND/orthopnea, or sign of hypervolemia that suggest CHF exacerbation. Last echo (9/25/19) showed normal global and regional left ventricular function with an EF of 60-65%. Normal RV and trivial residual MR.  - ChadsVasc Score: 4  - Anticoagulation: Apixaban 2.5 mg twice daily    - Rate control: Carvedilol 3.125 mg twice daily  - Hold-off Lasix (NEIL, dehydration)  - I/O. Daily weight. Trend BMP.  - Continue PTA isosorbide mononitrate (IMDUR) 60 MG 24     HTN  - Monitor BP  - Hold-off PTA Hydralazine 20 mg TID     Hypothyroidism  - Continue PTA Levothyroxine 75 mcg/day    Diet: Advance Diet as Tolerated: Regular Diet Adult    Fluids: None  Lines: PIVs  DVT Prophylaxis: On Apixaban for AF  Gallagher Catheter: not present  Code Status: DNR      Disposition Plan   Expected discharge: Tomorrow, recommended to prior living arrangement once adequate pain management/ tolerating PO medications and antibiotic plan established.  Entered: Elías Coleman MD 11/15/2019, 5:44 PM    The patient's care was discussed with the Attending Physician, Dr. Pedro Luis Kennedy, Bedside Nurse and Patient.    Elías Coleman MD  [address]  Pager: [phone]  Please see sticky note for cross cover information  ______________________________________________________________________    Interval History   States that her abdominal pain is improved and would like to eat. Denies fever. Has some SOB when she transfer or ambulate. Denies chest pain. No swelling in legs or ankles.    Data reviewed today: I reviewed all medications, new labs and imaging results over the last 24 hours. I personally reviewed no images or EKG's today.    Physical Exam   Vital Signs: Temp: 96.2  F  (35.7  C) Temp src: Oral BP: (!) 146/68 Pulse: 81   Resp: 18 SpO2: 99 % O2 Device: None (Room air)    Weight: 156 lbs 8 oz    Constitutional: awake, alert, cooperative, no acute distress.  Eyes: sclera clear, conjunctiva normal  Respiratory: no increased work of breathing, decreased coarse crackles on both lungs. no wheezing.  Cardiovascular: Normal apical impulse, regular rate and rhythm, normal S1 and S2, no S3 or S4, and no murmur noted  GI: RLQ surgical scars, normal bowel sounds, soft, non-distended, mild tenderness to palpation on lower abdomen, no masses palpated, no hepatosplenomegaly  Skin: normal skin color, texture, turgor  Musculoskeletal: trace lower extremity pitting edema present  Neurologic: A&Ox3. No focal neurological deficit.  Neuropsychiatric: euthymic. Answers questions appropriately.    Data   Recent Labs   Lab 11/15/19  0721 11/14/19  1346 11/13/19  1257   WBC 4.0 6.4 8.1   HGB 10.4* 11.4* 11.6*   * 102* 102*    252 218    136 134   POTASSIUM 3.9 4.7 3.8   CHLORIDE 108 103 100   CO2 26 27 29   BUN 37* 45* 40*   CR 1.63* 1.90* 1.92*   ANIONGAP 7 7 5   FATOUMATA 8.2* 8.8 9.0   GLC 83 76 77   ALBUMIN  --  3.1* 3.4   PROTTOTAL  --  7.6 7.6   BILITOTAL  --  0.6 0.8   ALKPHOS  --  51 54   ALT  --  22 20   AST  --  34 18   LIPASE  --   --  129     No results found for this or any previous visit (from the past 24 hour(s)).  Medications     - MEDICATION INSTRUCTIONS -         amoxicillin-clavulanate  1 tablet Oral Q12H CHRISTEN     apixaban ANTICOAGULANT  2.5 mg Oral BID     azaTHIOprine  50 mg Oral Daily     calcium carbonate-vitamin D  1 tablet Oral Daily     carvedilol  3.125 mg Oral BID w/meals     famotidine  20 mg Oral Daily     isosorbide mononitrate  60 mg Oral Daily     levothyroxine  75 mcg Oral Daily     sodium chloride (PF)  3 mL Intracatheter Q8H     trimethoprim  50 mg Oral Daily     Internal Medicine Staff Addendum  Date of Service: 11/15/2019  I have seen and examined Ms Spicer,  reviewed the data and discussed the plan of care with the care team on rounds.  I agree with the above documentation with the additions/changes to the ROS, HPI, Exam or data (including my edits in italics):    I discussed pt's care with bedside RN, case management/social work today.  I personally reviewed, labs, medications and past 24 hr notes.  Assessment/Plan/Diagnoses: plan/dx as above, which contains my edits and reflects our joint medical decision-making.     Pedro Luis Kennedy MD PhD  Internal Medicine Hospitalist & Staff Physician   of Internal Medicine   [facility]  Pager: [phone]

## 2019-11-15 NOTE — PLAN OF CARE
A&Ox4.  VSS on RA.  Pt SBA overnight w/ walker, calls appropriately.  L PIV infusing D5LR @ 100 ml/hr & scheduled zosyn.   Denies nausea, c/o stomach discomfort but declined intervention.  NPO ex ice chips.  LBM 11/14.  Continue to monitor and follow POC.

## 2019-11-15 NOTE — PLAN OF CARE
"/68 (BP Location: Right arm)   Pulse 81   Temp 96.8  F (36  C) (Oral)   Resp 18   Ht 1.613 m (5' 3.5\")   Wt 71 kg (156 lb 8 oz)   LMP  (LMP Unknown)   SpO2 96%   BMI 27.29 kg/m      Time 4132-5741      Reason for admission: Diverticulitis of colon   Vitals: VSS on RA  Activity: Up Ind  Pain: Denies pain  Neuro: A&Ox4, speech logical  Mood/Behavior: calm, cooperative  Cardiac: RRR, no edema BLE  Respiratory: LS clear  GI/: No BM this shift, voiding without difficulty  Diet:Regular  Lines: PIV   Skin: CDI  Labs/imaging: Echo / CT / MRI      New changes this shift:  Pt has been tolerating liquids, diet advanced to regular. IV abx switched to PO. Pt has been up walking around unit today      Plan: Possible discharge tomorrow. Continue to monitor and follow POC.           "

## 2019-11-15 NOTE — PROGRESS NOTES
Admission/Transfer from: [place] ED   2 RN skin assessment completed. Yes  Significant findings include: No significant findings. Dry, pale skin  WOC Nurse Consult Ordered? No, not necessary   Was NST/NA able to join during assessment? No

## 2019-11-15 NOTE — PROGRESS NOTES
SPIRITUAL HEALTH SERVICES  SPIRITUAL ASSESSMENT Progress Note  [facility] 5A   ON-CALL VISIT    REFERRAL SOURCE: Hospital  Request Upon admission    I attempted to meet with patient Linda but she was not in her room.     PLAN: I will notify the weekend on-call  of patient's request for spiritual support.    [place]  On[place]   Pager [phone]    [facility] remains available 24/7 for emergent requests/referrals, either by having the switchboard page the on-call  or by entering an ASAP/STAT consult in Epic (this will also page the on-call ).

## 2019-11-16 VITALS
TEMPERATURE: 95.6 F | WEIGHT: 156.5 LBS | DIASTOLIC BLOOD PRESSURE: 67 MMHG | RESPIRATION RATE: 16 BRPM | SYSTOLIC BLOOD PRESSURE: 139 MMHG | BODY MASS INDEX: 26.72 KG/M2 | HEIGHT: 64 IN | OXYGEN SATURATION: 97 % | HEART RATE: 80 BPM

## 2019-11-16 LAB
ANION GAP SERPL CALCULATED.3IONS-SCNC: 6 MMOL/L (ref 3–14)
BUN SERPL-MCNC: 31 MG/DL (ref 7–30)
CALCIUM SERPL-MCNC: 8.3 MG/DL (ref 8.5–10.1)
CHLORIDE SERPL-SCNC: 108 MMOL/L (ref 94–109)
CO2 SERPL-SCNC: 24 MMOL/L (ref 20–32)
CREAT SERPL-MCNC: 1.54 MG/DL (ref 0.52–1.04)
ERYTHROCYTE [DISTWIDTH] IN BLOOD BY AUTOMATED COUNT: 13.1 % (ref 10–15)
GFR SERPL CREATININE-BSD FRML MDRD: 30 ML/MIN/{1.73_M2}
GLUCOSE SERPL-MCNC: 86 MG/DL (ref 70–99)
HCT VFR BLD AUTO: 31.2 % (ref 35–47)
HGB BLD-MCNC: 10.2 G/DL (ref 11.7–15.7)
MCH RBC QN AUTO: 32.9 PG (ref 26.5–33)
MCHC RBC AUTO-ENTMCNC: 32.7 G/DL (ref 31.5–36.5)
MCV RBC AUTO: 101 FL (ref 78–100)
PLATELET # BLD AUTO: 227 10E9/L (ref 150–450)
POTASSIUM SERPL-SCNC: 4 MMOL/L (ref 3.4–5.3)
RBC # BLD AUTO: 3.1 10E12/L (ref 3.8–5.2)
SODIUM SERPL-SCNC: 139 MMOL/L (ref 133–144)
WBC # BLD AUTO: 4.8 10E9/L (ref 4–11)

## 2019-11-16 PROCEDURE — 25000132 ZZH RX MED GY IP 250 OP 250 PS 637: Mod: GY | Performed by: STUDENT IN AN ORGANIZED HEALTH CARE EDUCATION/TRAINING PROGRAM

## 2019-11-16 PROCEDURE — 25000131 ZZH RX MED GY IP 250 OP 636 PS 637: Mod: GY | Performed by: STUDENT IN AN ORGANIZED HEALTH CARE EDUCATION/TRAINING PROGRAM

## 2019-11-16 PROCEDURE — 25000132 ZZH RX MED GY IP 250 OP 250 PS 637: Mod: GY | Performed by: INTERNAL MEDICINE

## 2019-11-16 PROCEDURE — 99238 HOSP IP/OBS DSCHRG MGMT 30/<: CPT | Mod: GC | Performed by: INTERNAL MEDICINE

## 2019-11-16 PROCEDURE — 36415 COLL VENOUS BLD VENIPUNCTURE: CPT | Performed by: STUDENT IN AN ORGANIZED HEALTH CARE EDUCATION/TRAINING PROGRAM

## 2019-11-16 PROCEDURE — 85027 COMPLETE CBC AUTOMATED: CPT | Performed by: STUDENT IN AN ORGANIZED HEALTH CARE EDUCATION/TRAINING PROGRAM

## 2019-11-16 PROCEDURE — 80048 BASIC METABOLIC PNL TOTAL CA: CPT | Performed by: STUDENT IN AN ORGANIZED HEALTH CARE EDUCATION/TRAINING PROGRAM

## 2019-11-16 RX ORDER — AMOXICILLIN AND CLAVULANATE POTASSIUM 500; 125 MG/1; MG/1
1 TABLET, FILM COATED ORAL EVERY 12 HOURS
Qty: 14 TABLET | Refills: 0 | Status: SHIPPED | OUTPATIENT
Start: 2019-11-17 | End: 2019-11-16

## 2019-11-16 RX ORDER — AMOXICILLIN AND CLAVULANATE POTASSIUM 500; 125 MG/1; MG/1
1 TABLET, FILM COATED ORAL EVERY 12 HOURS
Qty: 14 TABLET | Refills: 0 | Status: ON HOLD | OUTPATIENT
Start: 2019-11-17 | End: 2019-11-26

## 2019-11-16 RX ADMIN — OYSTER SHELL CALCIUM WITH VITAMIN D 1 TABLET: 500; 200 TABLET, FILM COATED ORAL at 08:34

## 2019-11-16 RX ADMIN — CARVEDILOL 3.12 MG: 3.12 TABLET, FILM COATED ORAL at 08:34

## 2019-11-16 RX ADMIN — AMOXICILLIN AND CLAVULANATE POTASSIUM 1 TABLET: 500; 125 TABLET, FILM COATED ORAL at 08:45

## 2019-11-16 RX ADMIN — LEVOTHYROXINE SODIUM 75 MCG: 75 TABLET ORAL at 08:35

## 2019-11-16 RX ADMIN — AZATHIOPRINE 50 MG: 50 TABLET ORAL at 08:34

## 2019-11-16 RX ADMIN — APIXABAN 2.5 MG: 2.5 TABLET, FILM COATED ORAL at 08:34

## 2019-11-16 ASSESSMENT — ACTIVITIES OF DAILY LIVING (ADL)
ADLS_ACUITY_SCORE: 15

## 2019-11-16 NOTE — PLAN OF CARE
"/67 (BP Location: Left arm)   Pulse 80   Temp 95.6  F (35.3  C) (Oral)   Resp 16   Ht 1.613 m (5' 3.5\")   Wt 71 kg (156 lb 8 oz)   LMP  (LMP Unknown)   SpO2 97%   BMI 27.29 kg/m      Time 1434-2084      Reason for admission: Diverticulitis of colon   Vitals: VSS on RA  Activity: Up Ind  Pain: Denies  Neuro: A&Ox4, speech logical  Mood/Behavior: calm, cooperative  Cardiac: RRR, no edema BLE  Respiratory: LS clear  GI/: BM x1, voiding without difficulty  Diet:Regular  Skin: CDI        Plan: Pt will be discharging home on PO abx. Family will be transporting           "

## 2019-11-16 NOTE — PLAN OF CARE
Pt admitted for diverticulitis. A&Ox4. VSS on RA. No c/o pain or nausea. Diet advanced this evening, tolerating well. PIV SL. Switched to PO abx today. Voiding WDL. No BM this shift, passing gas. No tenderness w/ palpation. BLE moderate edema. Up independently in room. Plan to discharge back to assisted living today. Will continue with POC and discharge planning.

## 2019-11-16 NOTE — DISCHARGE SUMMARY
[facility], [place]  Discharge Summary - Medicine & Pediatrics       Date of Admission:  11/14/2019  Date of Discharge:  11/16/2019  Discharging Provider: Dr. 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 BMP check for NEIL.     Appointments on [place] and/or [facility] (with [facility] or [facility]   provider or service). Call [phone] if you haven't heard regarding   these appointments within 7 days of discharge.            Unresulted Labs Ordered in the Past 30 Days of this Admission     Date and Time Order Name Status Description    11/13/2019 1207 HYPERSENSITIVITY PNEUMONITIS In process     11/13/2019 1207 HYPERSENSITIVITY PNEUMONITIS 2 In process       These results will be followed up by primary care doctor and pulmonologist     Discharge Disposition   Discharged to assisted living  Condition at discharge: Stable    Hospital Course   Linda Spicer is a 88 year old female admitted on 11/14/2019. She has a history of seropositive rheumatoid arthritis with associated RA-ILD, HTN, CKD3, HFpEF (EF of 60-65% on Echo 09/25/19), tachy/bradycardia syndrome s/p permanent pacemaker, Aflutter/pAF, MR s/p mitral clip placement, and hypothyroidism and is admitted for diverticulitis.    Please refer to HPI dated 11/14/19 for further details.     The following problems were addressed during her hospitalization:     Sigmoid diverticulitis in immunosuppressed host  Presented with diffuse lower  abdominal pain, found to have sigmoid diverticulitis on CT without sign of perforation. No concern for perforation on physical exam. No clinical sepsis, no leukocytosis, and pain is improved. We continued to observe patient in house as she is on immunosuppressants and might not mount obvious inflammatory response. She remained febrile.  Abdominal pain was improved and tolerating regular diet without any issues on discharge.  - Started Zosyn 2.25g IV q8h switch to PO Augmentin (500/125mg q12h- dose adjusted for CKD) today (patient is allergic to Ciprofloxacin) > continue augmentin until (11/14-11/23) for 10 day course.     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 BUN:Cr > 20:1. Most likely pre-renal in the setting of acute intraabdominal infection with poor oral intake. Cr improved after hydration.  -BMP check with PCP    Seropositive RA with associated RA-ILD  Pt w/worsening SOB and slightly increased work of breathing but does not require oxygen. No fever or increased sputum production that suggest ILD flare or pneumonia. No signs of hypervolemia on admission. CT showed no significant changes in UIP pattern and revealed no consolidation. This is likely secondary to acute illness. Her breathing remained stable during her hospitalization and required no O2.   - Continued PTA Trimethoprim 50 mg/day  - Continued PTA Azathioprine 50 mg/day     HFpEF  Tachy/bradycardia syndrome s/p permanent pacemaker  Aflutter/pAF on anticoagulant  MR s/p mitral clip placement  No recent chest pain, PND/orthopnea, or sign of hypervolemia that suggest CHF exacerbation. Last echo (9/25/19) showed normal global and regional left ventricular function with an EF of 60-65%. Normal RV and trivial residual MR.  - ChadsVasc Score: 4  - Anticoagulation: Apixaban 2.5 mg twice daily    - Rate control: Carvedilol 3.125 mg twice daily  - Hold-off Lasix (NEIL, dehydration) until follow up with PCP   - I/O. Daily weight. Trend  BMP.  - Continue PTA isosorbide mononitrate (IMDUR) 60 MG 24     HTN  - Monitor BP  - Held PTA Hydralazine 20 mg TID- Ok to continue on discharge.      Hypothyroidism  - Continued PTA Levothyroxine 75 mcg/day     Consultations This Hospital Stay   MEDICATION HISTORY IP PHARMACY CONSULT    Code Status   DNR       The patient was discussed with Dr. Brent Fernandez MD  PGY3 Med/Peds  [phone]    [facility] 2 Service  [facility], [place]  ______________________________________________________________________    Physical Exam   Vital Signs: Temp: 95.6  F (35.3  C) Temp src: Oral BP: 139/67 Pulse: 80   Resp: 16 SpO2: 97 % O2 Device: None (Room air)    Weight: 156 lbs 8 oz    Gen: no acute distress, alert, interactive  HEENT: normal conjunctivae, EOMI  CV: RRR, no murmurs  Pulm: CTBA, no crackles or wheezing  Abd: soft, nl BS, minimal tenderness with palpation in LLQ  Msk: no deformities, no edema  Neuro: moving all extremities spontaneously   Skin: no rashes, dry      Primary Care Physician   Tre Lockett    Discharge Orders      Reason for your hospital stay    You were admitted for diverticulitis     Activity    Your activity upon discharge: activity as tolerated     Adult [facility] Follow-up and recommended labs and tests    Follow up with primary care provider, Tre Lockett, within 7 days for hospital follow- up.  BMP check to monitor NEIL     Appointments on [place] and/or [facility] (with [facility] or [facility] provider or service). Call [phone] if you haven't heard regarding these appointments within 7 days of discharge.     DNR (Do Not Resuscitate)     Diet    Follow this diet upon discharge: Orders Placed This Encounter      Advance Diet as Tolerated: Regular Diet Adult       Significant Results and Procedures   Most Recent 3 BMP's:  Recent Labs   Lab Test 11/16/19  0654 11/15/19  0721 11/14/19  1346    141 136   POTASSIUM 4.0 3.9 4.7   CHLORIDE 108 108 103    CO2 24 26 27   BUN 31* 37* 45*   CR 1.54* 1.63* 1.90*   ANIONGAP 6 7 7   FATOUMATA 8.3* 8.2* 8.8   GLC 86 83 76     Most Recent 2 LFT's:  Recent Labs   Lab Test 11/14/19  1346 11/13/19  1257   AST 34 18   ALT 22 20   ALKPHOS 51 54   BILITOTAL 0.6 0.8   ,   Results for orders placed or performed in visit on 11/13/19   CT Abdomen Pelvis w/o Contrast     Value    Radiologist flags Sigmoid diverticulitis (Urgent)    Narrative    EXAMINATION: CT ABDOMEN PELVIS W/O CONTRAST, 11/13/2019 12:38 PM    TECHNIQUE:  Helical CT images from the lung bases through the  symphysis pubis were obtained  without contrast.    COMPARISON: CT abdomen and pelvis 11/17/2018, CT chest 9/26/2019    HISTORY: Abd pain, gastroenteritis or colitis suspected; ILD  (interstitial lung disease) (H)    FINDINGS:    Lung bases: Partially visualized basilar honeycombing with  reticulation, architectural distortion, and traction bronchiectasis.  No suspicious pulmonary nodules. Partially visualized cardiac device  wires. Mitral clip.    Abdomen and pelvis: Unremarkable noncontrast appearance of the liver.  Multiple large gallstones not significantly changed from prior. No  adjacent inflammatory change to the gallbladder. Fatty atrophy of the  pancreas. Questionable residual cystic lesion in the pancreatic body  measuring 1.4 x 0.7 cm (series 3 image 103) previously measuring 2.6 x  2.0 cm. The main pancreatic duct is not dilated. The spleen is  unremarkable. Unchanged 1.0 cm splenic artery aneurysm. The adrenal  glands are unremarkable. No hydronephrosis or nephrolithiasis. No  stones along the expected course of the ureters. The bladder is  unremarkable. Hysterectomy. The small and large bowel are normal  caliber. There is scattered colonic diverticulosis. Inflammatory fat  stranding near the rectum and lower sigmoid colon. The appendix is not  visualized. There is no definite extraluminal air adjacent to the  inflamed colon. There is a small amount of free  fluid in the pelvis.  No loculated fluid collections.    Bones and soft tissues: Unchanged lucent focus at the inferior  endplate L2. Left-sided pars defect L4. Grade 1 anterolisthesis L4-5.  L4 laminectomy. Degenerative changes of the thoracolumbar spine, hips,  and sacroiliac joints. No new suspicious osseous lesions.      Impression    IMPRESSION:   1. Sigmoid colon diverticulitis. No free air or adjacent fluid  collections to suggest perforation.  2. Decreased size of previously noted cystic pancreatic lesion no  longer requiring follow-up.  3. Cholelithiasis without evidence for acute cholecystitis.  4. Partially visualized basilar pulmonary fibrotic changes better  evaluated on prior CT chest.    [Urgent Result: Sigmoid diverticulitis]    Finding was identified on 11/13/2019 1:40 PM.     Dr. Gallegos was contacted by Dr. Pugh at 11/13/2019 2:11 PM and  verbalized understanding of the urgent finding.      I have personally reviewed the examination and initial interpretation  and I agree with the findings.    PAVAN MOLINA MD     *Note: Due to a large number of results and/or encounters for the requested time period, some results have not been displayed. A complete set of results can be found in Results Review.       Discharge Medications   Current Discharge Medication List      START taking these medications    Details   amoxicillin-clavulanate (AUGMENTIN) 500-125 MG tablet Take 1 tablet by mouth every 12 hours  Qty: 14 tablet, Refills: 0    Associated Diagnoses: Diverticulitis of large intestine without perforation or abscess without bleeding         CONTINUE these medications which have NOT CHANGED    Details   apixaban ANTICOAGULANT (ELIQUIS ANTICOAGULANT) 2.5 MG tablet Take 1 tablet (2.5 mg) by mouth 2 times daily  Qty: 180 tablet, Refills: 3    Associated Diagnoses: Persistent atrial fibrillation      aspirin 81 MG EC tablet Take 81 mg by mouth daily      azaTHIOprine (IMURAN) 50 MG tablet Take 1  tablet (50 mg) by mouth daily  Qty: 90 tablet, Refills: 2    Comments: This replaces all previous azathioprine prescriptions.  Associated Diagnoses: Rheumatoid arthritis involving multiple sites with positive rheumatoid factor (H)      calcium carbonate-vitamin D (OSCAL W/D) 500-200 MG-UNIT tablet Take 1 tablet by mouth daily and an additional tablet every other day      carvedilol (COREG) 3.125 MG tablet Take 1 tablet (3.125 mg) by mouth 2 times daily (with meals)  Qty: 180 tablet, Refills: 1    Associated Diagnoses: Heart failure with preserved ejection fraction, NYHA class I (H); Benign essential hypertension      COMPOUNDED NON-CONTROLLED SUBSTANCE (CMPD RX) - PHARMACY TO MIX COMPOUNDED MEDICATION Estriol 1mg/gram. Place 1 gram vaginally daily for 2 weeks. Then vaginally twice weekly  Qty: 30 g, Refills: 6    Associated Diagnoses: Atrophic vaginitis      Cranberry 400 MG TABS Take 1 tablet by mouth daily      denosumab (PROLIA) 60 MG/ML SOSY injection Inject 60 mg Subcutaneous every 6 months      fish oil-omega-3 fatty acids 1000 MG capsule Take 1 g by mouth 2 times daily      hydrALAZINE (APRESOLINE) 10 MG tablet Take 2 tablets (20 mg) by mouth 3 times daily  Qty: 180 tablet, Refills: 3    Comments: Increase in dose  Associated Diagnoses: (HFpEF) heart failure with preserved ejection fraction (H); Mitral valve insufficiency, unspecified etiology      isosorbide mononitrate (IMDUR) 60 MG 24 hr tablet Take 1 tablet (60 mg) by mouth daily  Qty: 90 tablet, Refills: 1    Associated Diagnoses: Hypertension goal BP (blood pressure) < 150/90      levothyroxine (SYNTHROID/LEVOTHROID) 75 MCG tablet TAKE 1 TABLET BY MOUTH EVERY DAY  Qty: 90 tablet, Refills: 0    Associated Diagnoses: Hypothyroidism, unspecified type      Multiple Vitamins-Minerals (PRESERVISION AREDS) CAPS Take 1 capsule by mouth 2 times daily  Qty: 30 capsule, Refills: 1    Associated Diagnoses: Mild anemia      nitroGLYcerin (NITROSTAT) 0.4 MG  sublingual tablet Place 1 tablet (0.4 mg) under the tongue every 5 minutes as needed for chest pain  Qty: 25 tablet, Refills: 11    Associated Diagnoses: Acute chest pain      potassium chloride ER (K-DUR/KLOR-CON M) 20 MEQ CR tablet Take 2 tablets (40 mEq) by mouth daily  Qty: 180 tablet, Refills: 3    Associated Diagnoses: Acute on chronic diastolic heart failure (H)      ranitidine (ZANTAC) 150 MG tablet Take 150 mg by mouth daily      saccharomyces boulardii (FLORASTOR) 250 MG capsule Take 250 mg by mouth daily      senna-docusate (SENOKOT-S/PERICOLACE) 8.6-50 MG tablet Take 1 tablet by mouth daily as needed for constipation  Qty: 100 tablet, Refills: 3    Associated Diagnoses: Irritable bowel syndrome, unspecified type      trimethoprim (TRIMPEX) 100 MG tablet Take 0.5 tablets (50 mg) by mouth daily  Qty: 45 tablet, Refills: 0    Associated Diagnoses: Recurrent UTI      vitamin C (ASCORBIC ACID) 250 MG tablet Take 250 mg by mouth daily      !! order for DME Dispense SP Walker-small  Qty: 1 each, Refills: 0    Associated Diagnoses: Pain of toe of right foot; Status post bunionectomy      !! order for DME Equipment being ordered: Dispense baffle, for use with nebulizer.  Qty: 1 each, Refills: 0    Associated Diagnoses: Pneumonia of left upper lobe due to Mycoplasma pneumoniae      !! order for DME Equipment being ordered: Nebulizer. Use with Albuterol.  Qty: 1 each, Refills: 0    Associated Diagnoses: Hypoxia      !! order for DME Equipment being ordered: Dispense face mask.  Mrs. Spicer is immunosuppressed due to rheumatoid arthritis.  Qty: 1 Box, Refills: 11    Associated Diagnoses: Rheumatoid arthritis involving left wrist with positive rheumatoid factor (H)       !! - Potential duplicate medications found. Please discuss with provider.      STOP taking these medications       furosemide (LASIX) 20 MG tablet Comments:   Reason for Stopping:             Allergies   Allergies   Allergen Reactions     Cephalexin  Diarrhea and GI Disturbance     Other reaction(s): GI Upset       Cephalexin Hcl Diarrhea     Gabapentin Other (See Comments)     Dizzsiness  Shaky, dizzy, dry mouth  Dizzsiness  Shaky,dry mouth       Methotrexate Other (See Comments)     Depleted Folic Acid  And caused severe leg cramps  Severe leg cramps     Naproxen GI Disturbance, Other (See Comments) and Nausea     Constipation. Tolerates ibuprofen.       Perfume      Sulfa Drugs Shortness Of Breath     Throat swelling     Alprazolam Other (See Comments)     Dizziness      Levofloxacin GI Disturbance     Macrobid [Nitrofurantoin Anhydrous]      Possibly related to lung disease      Nitrofurantoin      Possibly related to lung disease      Seasonal Allergies      Ciprofloxacin Itching and Rash     Internal Medicine Staff Addendum  Date of Service: 11/16/2019  I have seen and examined Ms Spicer, reviewed the data and discussed the plan of care with the care team on rounds.  I agree with the above documentation with the additions/changes to the ROS, HPI, Exam or data (including my edits in italics):    I discussed pt's care with bedside RN, case management/social work today.  I personally reviewed, labs, medications and past 24 hr notes.  Assessment/Plan/Diagnoses: plan/dx as above, which contains my edits and reflects our joint medical decision-making.     Pedro Luis Kennedy MD PhD  Internal Medicine Hospitalist & Staff Physician   of Internal Medicine   [facility]  Pager: [phone]

## 2019-11-17 ENCOUNTER — PATIENT OUTREACH (OUTPATIENT)
Dept: CARE COORDINATION | Facility: CLINIC | Age: 84
End: 2019-11-17

## 2019-11-17 LAB
A FLAVUS AB SER QL ID: NORMAL
A FUMIGATUS1 AB SER QL ID: NORMAL
A FUMIGATUS2 AB SER QL ID: NORMAL
A FUMIGATUS3 AB SER QL ID: NORMAL
A FUMIGATUS6 AB SER QL ID: NORMAL
A PULLULANS AB SER QL ID: NORMAL
PIGEON DROP IGE QN: NORMAL
S RECTIVIRGULA AB SER QL ID: NORMAL
S VIRIDIS AB SER QL ID: NORMAL
T CANDIDUS AB SER QL: NORMAL
T VULGARIS AB SER QL ID: NORMAL

## 2019-11-18 ENCOUNTER — PATIENT OUTREACH (OUTPATIENT)
Dept: CARE COORDINATION | Facility: CLINIC | Age: 84
End: 2019-11-18

## 2019-11-18 ENCOUNTER — TELEPHONE (OUTPATIENT)
Dept: CARDIOLOGY | Facility: CLINIC | Age: 84
End: 2019-11-18

## 2019-11-18 ENCOUNTER — TEAM CONFERENCE (OUTPATIENT)
Dept: PULMONOLOGY | Facility: CLINIC | Age: 84
End: 2019-11-18

## 2019-11-18 DIAGNOSIS — I50.33 ACUTE ON CHRONIC DIASTOLIC HEART FAILURE (H): Primary | ICD-10-CM

## 2019-11-18 RX ORDER — FUROSEMIDE 20 MG
40 TABLET ORAL DAILY
Qty: 180 TABLET | Refills: 1
Start: 2019-11-18 | End: 2019-11-21

## 2019-11-18 ASSESSMENT — ACTIVITIES OF DAILY LIVING (ADL): DEPENDENT_IADLS:: TRANSPORTATION

## 2019-11-18 NOTE — TELEPHONE ENCOUNTER
Discussed phone message with Karla FLANNERY NP.  Karla recommended patient resume lasix at 40mg daily and schedule an appointment with her within 1 week.  Called Arjun and relayed information to him. He verbalized understanding of directions.  He will schedule a lab appt for 11/20 and I will add her to Karla's schedule on 11/21 at 9:00am.     Pamela Aguilar RN

## 2019-11-18 NOTE — PROGRESS NOTES
Clinic Care Coordination Contact    Clinic Care Coordination Contact  OUTREACH    Referral Information:  Referral Source: IP Report    Primary Diagnosis: GI Disorders  Chief Complaint   Patient presents with     Clinic Care Coordination - Post Hospital     RN   Universal Utilization: Inpatient at [facility] from 11/14/19 to 11/16/19 with diagnosis of sigmoid diverticulitis in an immunosuppressed patient.   Clinic Utilization  Difficulty keeping appointments:: No  Compliance Concerns: No  No-Show Concerns: No  No PCP office visit in Past Year: No  Utilization    Last refreshed: 11/18/2019  7:49 AM:  Hospital Admissions 7           Last refreshed: 11/18/2019  7:49 AM:  ED Visits 2           Last refreshed: 11/18/2019  7:49 AM:  No Show Count (past year) 1              Current as of: 11/18/2019  7:49 AM          Clinical Concerns:    Current Medical Concerns:  Patient has rheumatoid arthrits and received Orencia infusions. History of CHF, kidney disease      Patient states she is feeling better. She is back on her Lasix. She denies short of breath.   Patient denies any diarrhea. She states she had a normal soft stool yesterday and again this am. She denies any abdominal pain.     Patient is aware she has an MTM appointment today at 2 pm.   Patient states that she is still feeling a little weak so she is going to her exercise class this am. She states  Neighbor has borrowed her an electric scooter so she will take this to the next building in the complex where the class is at. She feels the walk will be too far today with her walker.     Current Behavioral Concerns: Denies concerns    Education Provided to patient: Elevate feet when sitting. Continue daily weights and report to cardiology    Pain  Pain (GOAL):: No    Health Maintenance Reviewed: Due/Overdue   Health Maintenance Due   Topic Date Due     [facility] IMMUNIZATION (1 of 2) 06/[birth year]/1981     MEDICARE ANNUAL WELLNESS VISIT  05/28/2015     MICROALBUMIN  07/26/2018     HF  "ACTION PLAN  2019     Clinical Pathway: Clinic Care Coordination CHF Assessment    Discharge:    Day of hospital discharge: 19    What recommendations were made for follow up after your recent hospitalization? Follow up with PCP and cardiology  Have the follow up appointments been scheduled? Patient needs to discuss with her children as they will schedule and drive her.  If not, can I help you set up these appointments? No  Transportation concerns (GOAL):: No    CHF:    Home scale available:  Yes  Home scale weight this mornin    Hospital discharge weight: 156   Current weight: 156   Heart Failure Zones sheet on refrigerator or available: Yes  Any increased SOB since hospital discharge:  No  Any increased edema since hospital discharge:  No  What number to call for YELLOW zones: [phone]    Symptom Review:   Heart Failure Symptoms  Shortness of breath:: No  Shortness of breath symptom course:: Improved  Waking up at night short of breath?: No  Prop up on pillows or sleep in chair to breathe easier? : No  Trouble walking or talking while short of breath?: No  Wheezing or noisy breathing?: No  Cough: No  Increased sputum: No  Fever: No  Chest pain: : No  Heartbeat: Regular  Dizzy or Lightheaded: No  Cerebral Symptoms: Memory Impairment, Anxiety  Checking weight daily? : Yes  Weight?: Loss  Today's Weight?: 70.8 kg (156 lb)  Weight increase more than 2 lbs in 24 hours?: No  Weight Increase more than 5 lbs in 1 week? : No  Does the patient have understanding of Diuretic self-management?: Yes  Diet:: 2000 mg of sodium  Appetite:: Normal  Bloating:: None  Urination:: Normal  Fatigue: No  Weakness (Heaviness in limbs):: No  Cognitive:: Memory changes  Emotional:: Worry  What Heart Failure zone are you currently in?: Green  Overall your CHF symptoms are (GOAL):: Improving  How confident are you with the plan we have identified?: 8    Medications:  \"How many new medications are you on since your " "hospitalization?\"  0 - 1  \"How many of your current medicines changed (dose, timing, name, etc.) while you were in the hospital?\"  0 - 1  \"Do you have questions about your medications?\"  [facility] appointment today    Is patient on Warfarin?  No  Is Ejection Fraction <40%: No    When is the first appointment with the CHF clinic: not scheduled at this time.       Medication Management:  Patient is knowledgeable about medications. She is independnet with administration     Functional Status:  Dependent ADLs:: Ambulation-walker  Dependent IADLs:: Transportation  Bed or wheelchair confined:: No  Mobility Status: Independent w/Device  Fallen 2 or more times in the past year?: No  Any fall with injury in the past year?: No    Living Situation:  Current living arrangement:: I live alone  Type of residence:: Apartment    Diet/Exercise/Sleep:  Diet:: Low cholesterol, Low saturated fat, No added salt  Inadequate nutrition (GOAL):: No  Food Insecurity: No  Tube Feeding: No  Exercise:: Currently not exercising  Inadequate activity/exercise (GOAL):: No  Significant changes in sleep pattern (GOAL): No    Transportation:  Transportation concerns (GOAL):: No  Transportation means:: Family, [place] mobility     Psychosocial:  Druze or spiritual beliefs that impact treatment:: No  Mental health DX:: No  Mental health management concern (GOAL):: No  Informal Support system:: Children, Neighbors     Financial/Insurance:   Financial/Insurance concerns (GOAL):: No     Resources and Interventions:  Current Resources:   Community Resources: None  Supplies used at home:: Nebulizer tubing  Equipment Currently Used at Home: walker, rolling    Advance Care Plan/Directive  Advanced Care Plans/Directives on file:: Yes  Type Advanced Care Plans/Directives: Advanced Directive - On File    Patient/Caregiver understanding: Patient expresses understanding   Future Appointments              Today Marilu Mitchell [facility], [facility] CLIN "    In 3 weeks [place] [facility] [place]    In 1 month [place] [facility] [place]    In 3 months UC ICD [facility]    In 5 months Mario Davenport MD [facility] CAIT [place]    In 5 months DEVICE CHECK RN CARD [facility] [place], [facility] PSA CLIN        Plan: Patient will have her children schedule her follow up appointments.     Patient declines the need for further follow up intervention from clinic care coordination.      Cintia Alves RN, CCM - Primary Care Clinic RN Coordinator  [facility]   11/18/2019    11:34 AM  [phone]

## 2019-11-18 NOTE — TELEPHONE ENCOUNTER
Health Call Center    Phone Message    May a detailed message be left on voicemail: yes    Reason for Call: Medication Question or concern regarding medication   Prescription Clarification  Name of Medication: Lasix  Prescribing Provider: Karla Mabry   Pharmacy: [facility]   What on the order needs clarification? Arjun calling to report that Linda was in the hospital Thurs, Fri, and was discharged on Sat.  They had stopped her Lasix due to concern over her kidney function.  Arjun restarted it on Sunday after Linda had gained 10 pounds.  He is wanting to get clarification on the dosage for the medication.  Please call him back to advise          Action Taken: Message routed to:  Other: FK heart

## 2019-11-18 NOTE — TELEPHONE ENCOUNTER
ILD Conference      Patient Name: Linda Spicer    Pertinent Clinical History: The patient is an 88-year-old female with a history of seropositive rheumatoid arthritis with associated RA-ILD.     Reason for conference discussion/Specific Question:  New consult    Referring Physician:  Dr Tre Lockett    Radiology Interpretation: RA-ILD. Stable, no significant evidence of progression. Saumya Walter MD (radiology)    Pathology Interpretation: n/a       There was a consensus recommendation for the following actions:     RA-ILD no evidence of progression.     Pulmonary/ILD Provider: Gokul Gallegos MD (pulm)

## 2019-11-19 NOTE — TELEPHONE ENCOUNTER
Informed patient and son Arjun of results of ILD conference. Plan will be to follow up in 4 months.

## 2019-11-20 DIAGNOSIS — I50.33 ACUTE ON CHRONIC DIASTOLIC HEART FAILURE (H): ICD-10-CM

## 2019-11-20 LAB
ANION GAP SERPL CALCULATED.3IONS-SCNC: 6 MMOL/L (ref 3–14)
BUN SERPL-MCNC: 31 MG/DL (ref 7–30)
CALCIUM SERPL-MCNC: 9 MG/DL (ref 8.5–10.1)
CHLORIDE SERPL-SCNC: 104 MMOL/L (ref 94–109)
CO2 SERPL-SCNC: 27 MMOL/L (ref 20–32)
CREAT SERPL-MCNC: 1.54 MG/DL (ref 0.52–1.04)
GFR SERPL CREATININE-BSD FRML MDRD: 30 ML/MIN/{1.73_M2}
GLUCOSE SERPL-MCNC: 107 MG/DL (ref 70–99)
POTASSIUM SERPL-SCNC: 4.6 MMOL/L (ref 3.4–5.3)
SODIUM SERPL-SCNC: 137 MMOL/L (ref 133–144)

## 2019-11-20 PROCEDURE — 80048 BASIC METABOLIC PNL TOTAL CA: CPT | Performed by: NURSE PRACTITIONER

## 2019-11-20 PROCEDURE — 36415 COLL VENOUS BLD VENIPUNCTURE: CPT | Performed by: NURSE PRACTITIONER

## 2019-11-21 ENCOUNTER — OFFICE VISIT (OUTPATIENT)
Dept: CARDIOLOGY | Facility: CLINIC | Age: 84
End: 2019-11-21
Payer: MEDICARE

## 2019-11-21 VITALS
DIASTOLIC BLOOD PRESSURE: 65 MMHG | OXYGEN SATURATION: 99 % | BODY MASS INDEX: 27.55 KG/M2 | SYSTOLIC BLOOD PRESSURE: 102 MMHG | WEIGHT: 158 LBS | HEART RATE: 87 BPM

## 2019-11-21 DIAGNOSIS — N18.30 CKD (CHRONIC KIDNEY DISEASE) STAGE 3, GFR 30-59 ML/MIN (H): ICD-10-CM

## 2019-11-21 DIAGNOSIS — I10 BENIGN ESSENTIAL HYPERTENSION: ICD-10-CM

## 2019-11-21 DIAGNOSIS — I50.33 ACUTE ON CHRONIC DIASTOLIC HEART FAILURE (H): Primary | ICD-10-CM

## 2019-11-21 DIAGNOSIS — Z71.89 ADVANCED CARE PLANNING/COUNSELING DISCUSSION: ICD-10-CM

## 2019-11-21 DIAGNOSIS — I48.91 ATRIAL FIBRILLATION, UNSPECIFIED TYPE (H): ICD-10-CM

## 2019-11-21 DIAGNOSIS — I34.0 MITRAL VALVE INSUFFICIENCY, UNSPECIFIED ETIOLOGY: ICD-10-CM

## 2019-11-21 PROCEDURE — 99215 OFFICE O/P EST HI 40 MIN: CPT | Performed by: NURSE PRACTITIONER

## 2019-11-21 RX ORDER — FUROSEMIDE 20 MG
40 TABLET ORAL 2 TIMES DAILY
Qty: 120 TABLET | Refills: 11 | Status: ON HOLD | OUTPATIENT
Start: 2019-11-21 | End: 2019-11-26

## 2019-11-21 NOTE — NURSING NOTE
"Chief Complaint   Patient presents with     Heart Failure      HFpEF and s/p Mitraclip presents for post hospital follow up with labs prior. Admitted 11/14-16 for Diverticulitis. Lasix held at discharge d/t creatinine. Resumed 40mg daily 11/19.     Shortness of Breath     sob. and dizziness occationally per patient       Initial /65 (BP Location: Right arm, Patient Position: Sitting, Cuff Size: Adult Large)   Pulse 87   Wt 71.7 kg (158 lb)   LMP  (LMP Unknown)   SpO2 99%   BMI 27.55 kg/m   Estimated body mass index is 27.55 kg/m  as calculated from the following:    Height as of 11/14/19: 1.613 m (5' 3.5\").    Weight as of this encounter: 71.7 kg (158 lb)..  BP completed using cuff size: harika Melendez L.P.N.    "

## 2019-11-21 NOTE — PROGRESS NOTES
HPI  Linda Spicer is a 88 year old female with a past medical history of CKD, HTN, anemia, pulmonary fibrosis, tachy/bradycardia syndrome s/p permanent pacemaker placement, atrial flutter, PAF, mild to moderate MR s/p mitral clip placement, and HFpEF.      Since her hospitalization, Linda is feeling better, but is still not back to baseline. She is still SOB all of the time, but she feels her SOB has improved after restarting Lasix.  Her weight went up 5 lbs but now is back to 153.2 lbs.  She is SOB at rest, has RUSSELL with minimal exertion, and has lower extremity edema.  She is fatigued and has been taking long three hour naps daily due to feeling so fatigued. She endorses early satiety and states that her appetite is poor.      Both she and her son can't understand why she is feeling so terribly when her testing comes back negative.  Linda admits that she is slowing down and that she isn't bouncing back as quickly as she has done previously.    [facility]  Past Medical History:   Diagnosis Date     Adjustment disorder with anxious mood 5/18/2015     Advanced directives, counseling/discussion 8/30/2012    Patient states has Advance Directive and will bring in a copy to clinic. 8/30/2012   [facility] Medical Assistant \       Anemia 9/25/2015     Basal cell cancer      CHF (congestive heart failure) (H) 9/18/2014     CKD (chronic kidney disease) stage 3, GFR 30-59 ml/min (H) 9/29/2015     DDD (degenerative disc disease), lumbar 9/25/2015     Diffuse idiopathic pulmonary fibrosis (H) 5/6/2013     Encounter for palliative care 5/18/2015     History of blood transfusion 9/29/2015     Hypertension goal BP (blood pressure) < 140/90 9/7/2012     Hypothyroid 9/7/2012     Irritable bowel syndrome 10/29/2013     Macular degeneration      Macular degeneration, 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     Nondisplaced spiral fracture of shaft of humerus      Osteoporosis 8/13/2013     Imo Update utility     RA (rheumatoid arthritis) (H)  5/7/2013     Rheumatic fever      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 2/20/2019     Shingles      Spinal stenosis of lumbar region with neurogenic claudication 9/14/2015       Past Surgical History:   Procedure Laterality Date     ANESTHESIA CARDIOVERSION N/A 9/14/2018    Procedure: ANESTHESIA CARDIOVERSION;;  Surgeon: GENERIC ANESTHESIA PROVIDER;  Location: [facility] OR     ANESTHESIA CARDIOVERSION N/A 10/11/2018    Procedure: ANESTHESIA CARDIOVERSION;  Cardioversion;  Surgeon: GENERIC ANESTHESIA PROVIDER;  Location: [facility] OR     ANESTHESIA CARDIOVERSION N/A 10/16/2018    Procedure: Anesthesia Cardioversion ;  Surgeon: GENERIC ANESTHESIA PROVIDER;  [facility]     APPENDECTOMY       BIOPSY      hemorrhoidectomy     CV HEART CATHETERIZATION WITH POSSIBLE INTERVENTION N/A 1/31/2019    Procedure: CORS,LHC,RHC-YOLANDA BEFORE CATH APPOINTMENT;  Surgeon: Avila Watson MD;  Location:  HEART CARDIAC CATH LAB     CV MITRACLIP N/A 2/11/2019    Procedure: Mitraclip Procedure;  Surgeon: Avila Watson MD;  Location:  OR     ENT SURGERY      tonsillectomy     GYN SURGERY      3 D & C's     HYSTERECTOMY, PAP NO LONGER INDICATED       LAMINECTOMY LUMBAR ONE LEVEL N/A 10/13/2015    Procedure: LAMINECTOMY LUMBAR ONE LEVEL;  Surgeon: Fransico Toussaint MD;  Location:  OR       Family History   Problem Relation Age of Onset     Hypertension Mother      Psychotic Disorder Father      Diabetes Son      Diabetes Daughter      Blood Disease Daughter        Social History     Socioeconomic History     Marital status:      Spouse name: None     Number of children: None     Years of education: None     Highest education level: None   Occupational History     None   Social Needs     Financial resource strain: None     Food insecurity:     Worry: None     Inability: None     Transportation needs:     Medical: None     Non-medical: None   Tobacco Use     Smoking status: Never Smoker     Smokeless tobacco: Never Used    Substance and Sexual Activity     Alcohol use: Yes     Comment: rare wine      Drug use: No     Sexual activity: Never   Lifestyle     Physical activity:     Days per week: 0 days     Minutes per session: 0 min     Stress: None   Relationships     Social connections:     Talks on phone: None     Gets together: None     Attends Faith service: None     Active member of club or organization: None     Attends meetings of clubs or organizations: None     Relationship status: None     Intimate partner violence:     Fear of current or ex partner: None     Emotionally abused: None     Physically abused: None     Forced sexual activity: None   Other Topics Concern     Parent/sibling w/ CABG, MI or angioplasty before 65F 55M? No   Social History Narrative    . Has six children. She enjoys [employer] and [employer].  passed away.  Her son has financial and alcohol issues.       ALLERGIES  Allergies   Allergen Reactions     Cephalexin Diarrhea and GI Disturbance     Other reaction(s): GI Upset       Cephalexin Hcl Diarrhea     Gabapentin Other (See Comments)     Dizzsiness  Shaky, dizzy, dry mouth  Dizzsiness  Shaky,dry mouth       Methotrexate Other (See Comments)     Depleted Folic Acid  And caused severe leg cramps  Severe leg cramps     Naproxen GI Disturbance, Other (See Comments) and Nausea     Constipation. Tolerates ibuprofen.       Perfume      Sulfa Drugs Shortness Of Breath     Throat swelling     Alprazolam Other (See Comments)     Dizziness      Levofloxacin GI Disturbance     Macrobid [Nitrofurantoin Anhydrous]      Possibly related to lung disease      Nitrofurantoin      Possibly related to lung disease      Seasonal Allergies      Ciprofloxacin Itching and Rash       MEDICATIONS amoxicillin-clavulanate (AUGMENTIN) 500-125 MG tablet, Take 1 tablet by mouth every 12 hours  apixaban ANTICOAGULANT (ELIQUIS ANTICOAGULANT) 2.5 MG tablet, Take 1 tablet (2.5 mg) by mouth 2 times daily  aspirin 81 MG EC  tablet, Take 81 mg by mouth daily  azaTHIOprine (IMURAN) 50 MG tablet, Take 1 tablet (50 mg) by mouth daily  calcium carbonate-vitamin D (OSCAL W/D) 500-200 MG-UNIT tablet, Take 1 tablet by mouth daily and an additional tablet every other day  carvedilol (COREG) 3.125 MG tablet, Take 1 tablet (3.125 mg) by mouth 2 times daily (with meals)  COMPOUNDED NON-CONTROLLED SUBSTANCE (CMPD RX) - PHARMACY TO MIX COMPOUNDED MEDICATION, Estriol 1mg/gram. Place 1 gram vaginally daily for 2 weeks. Then vaginally twice weekly (Patient taking differently: Place 1 g vaginally every 48 hours Estriol 1mg/gram cream)  Cranberry 400 MG TABS, Take 1 tablet by mouth daily  denosumab (PROLIA) 60 MG/ML SOSY injection, Inject 60 mg Subcutaneous every 6 months  fish oil-omega-3 fatty acids 1000 MG capsule, Take 1 g by mouth 2 times daily  furosemide (LASIX) 20 MG tablet, Take 2 tablets (40 mg) by mouth daily  hydrALAZINE (APRESOLINE) 10 MG tablet, Take 2 tablets (20 mg) by mouth 3 times daily  isosorbide mononitrate (IMDUR) 60 MG 24 hr tablet, Take 1 tablet (60 mg) by mouth daily  levothyroxine (SYNTHROID/LEVOTHROID) 75 MCG tablet, TAKE 1 TABLET BY MOUTH EVERY DAY  Multiple Vitamins-Minerals (PRESERVISION AREDS) CAPS, Take 1 capsule by mouth 2 times daily  nitroGLYcerin (NITROSTAT) 0.4 MG sublingual tablet, Place 1 tablet (0.4 mg) under the tongue every 5 minutes as needed for chest pain  order for DME, Dispense TIESHA Curran  order for DME, Equipment being ordered: Dispense baffle, for use with nebulizer.  order for DME, Equipment being ordered: Nebulizer. Use with Albuterol.  order for DME, Equipment being ordered: Dispense face mask.  Mrs. Spicer is immunosuppressed due to rheumatoid arthritis.  potassium chloride ER (K-DUR/KLOR-CON M) 20 MEQ CR tablet, Take 2 tablets (40 mEq) by mouth daily (Patient taking differently: Take 20 mEq by mouth 2 times daily )  ranitidine (ZANTAC) 150 MG tablet, Take 150 mg by mouth daily  saccharomyces  boulardii (FLORASTOR) 250 MG capsule, Take 250 mg by mouth daily  senna-docusate (SENOKOT-S/PERICOLACE) 8.6-50 MG tablet, Take 1 tablet by mouth daily as needed for constipation  trimethoprim (TRIMPEX) 100 MG tablet, Take 0.5 tablets (50 mg) by mouth daily  vitamin C (ASCORBIC ACID) 250 MG tablet, Take 250 mg by mouth daily    No current facility-administered medications on file prior to visit.       ROS:  Constitutional: Denies fever, chills, or diaphoresis. +Fatigue and weight gain.  Respiratory:  See HPI.     Cardiovascular: As per HPI.   GI: Denies nausea, vomiting, diarrhea, hematemesis, melena, or hematochezia.   : See HPI   Integument: Negative for bruising, rash, or pruritis.  Psychiatric: +anxiety and depression, controlled  Neuro: Negative for headaches, syncope, numbness or tingling.   Endocrinology:  +Hypothyroidism  Musculoskeletal:  +gait instability and muscle weakness    EXAM  /65 (BP Location: Right arm, Patient Position: Sitting, Cuff Size: Adult Large)   Pulse 87   Wt 71.7 kg (158 lb)   LMP  (LMP Unknown)   SpO2 99%   BMI 27.55 kg/m    General: Appears pale, unwell, and fatigued.  Head: normocephalic, atraumatic  Eyes: anicteric sclera, EOMI  Neck: Neck supple. No masses.  Orophyarynx: moist mucosa, no cyanosis  Heart: regular, S1/S2, no murmur, gallop, rub, estimated JVP 11 cm  Lungs: Respirations slightly labored at rest, Lungs clear bilaterally. No rhonchi or wheezing  Abdomen: soft, non-tender, bowel sounds present, no hepatomegaly  Extremities: 2+ pitting edema.    Neurological: normal speech and affect, no gross motor deficits  Skin: Warm.  No ecchymosis or lesions.    LABS  Last Comprehensive Metabolic Panel:  Sodium   Date Value Ref Range Status   11/20/2019 137 133 - 144 mmol/L Final     Potassium   Date Value Ref Range Status   11/20/2019 4.6 3.4 - 5.3 mmol/L Final     Chloride   Date Value Ref Range Status   11/20/2019 104 94 - 109 mmol/L Final     Carbon Dioxide   Date  Value Ref Range Status   11/20/2019 27 20 - 32 mmol/L Final     Anion Gap   Date Value Ref Range Status   11/20/2019 6 3 - 14 mmol/L Final     Glucose   Date Value Ref Range Status   11/20/2019 107 (H) 70 - 99 mg/dL Final     Urea Nitrogen   Date Value Ref Range Status   11/20/2019 31 (H) 7 - 30 mg/dL Final     Creatinine   Date Value Ref Range Status   11/20/2019 1.54 (H) 0.52 - 1.04 mg/dL Final     GFR Estimate   Date Value Ref Range Status   11/20/2019 30 (L) >60 mL/min/[1.73_m2] Final     Comment:     Non  GFR Calc  Starting 12/18/2018, serum creatinine based estimated GFR (eGFR) will be   calculated using the Chronic Kidney Disease Epidemiology Collaboration   (CKD-EPI) equation.       Calcium   Date Value Ref Range Status   11/20/2019 9.0 8.5 - 10.1 mg/dL Final       MOST RECENT ECHOCARDIOGRAM 9/25/19:  Interpretation Summary  Global and regional left ventricular function is normal with an EF of 60-65%.  Right ventricular function, chamber size, wall motion, and thickness are  normal.  S/P Mitral clip.Mean mitral gradient 4.5mmHg at heartrate 70BPM. Trivial  residual MR.  The inferior vena cava is normal.  No pericardial effusion is present.  ____________________________      ASSESSMENT AND PLAN  Ms. Linda Spicer is an 88 year old female with HFpEF who appears hypervolemic today.  I increased her Lasix to 40 mg twice daily. She will repeat a BMP next week to reassess her electrolytes.  I will also repeat an echo to reassess her volume status and to look for any changes from her last echo.  I have also placed a palliative care referral to help with symptom management and to outline goals of care.      We had another lengthy discussion today that she had end stage HF and that our options are medical management and symptom management at this point.  She is not ready to transition to hospice at this point, but is willing to see palliative care to help with symptom management. A referral has been  placed.    1. Chronic HFpEF (EF 60-65%). Risk factors include female gender, age, HTN, obesity and arrhythmia  The mainstays of therapy for HFpEF include volume management, blood pressure control, treating underlying sleep apnea if present, treatment of underlying CAD if within the goals of care, weight management, exercise training, rate control for atrial fibrillation as well as consideration for a rhythm control strategy, and consideration for clinical trials. Consideration of spironolactone in low risk patients should be taken given the positive CHF results in the TOPCAT trial.  Stage D. NYHA Class IV  Primary Cardiologist: Dr. Loza; Last seen 3/13/19  BP:  Hydralazine 20 mg three times daily.  Imdur 60 mg daily.  Fluid status Hypervolemic  Aldosterone antagonist: no, deferred due to renal dysfunction  Ischemia evaluation: Complete Negative stress test 2014  ACEi/ARB/ARNI: n/a, no evidence for use in HFpEF  BB: Carvedilol 3.125 mg twice daily.  SCD prophylaxis: n/a, no evidence for use in HFpEF  NSAID use: Contraindicated  Sleep apnea evaluation: Refused    Remote PA Pressure Monitoring (CardioMems) Not in goals of care  Remote monitoring:  MyChart   Anticoagulation: Apixaban 2.5 mg twice daily.  Antiplatelet: None    2. Atrial Fibrillation:  Anticoagulation: Apixaban 2.5 mg twice daily    Rate control:  Coreg 3.125 mg twice daily.  ChadsVasc Score: 4  HR 87     3.  Tachy-Jadiel syndrome s/p PPM:  Followed by EP. Device checks per protocol.     4.  CKD Stage IV:  Creatinine today 1.54. Baseline per record review is 1.4. Suspect cardiorenal.  Diurese as outlined above.     5. Mitral regurgitation:  S/p Mitraclip.  Followed by Dr. Loza.  Follow up with him per previous recommendations.      6.  Advanced care planning:  Not ready to transition to hospice.  Palliative care referral placed for symptom management.    7.  Follow up:  BMP and echo next week.  Dr. Loza in March 2020.  Device check in May.    >40  minutes spent face to face with patient with >50% in counseling, education, and coordination of care       Karla MOELLER, KAVEH  [facility]

## 2019-11-21 NOTE — LETTER
11/21/2019      RE: Linda Spicer  [address]  [facility] [address]       Dear Colleague,    Thank you for the opportunity to participate in the care of your patient, Linda Spicer, at the [facility]. Please see a copy of my visit note below.    HPI  Linda Spicer is a 88 year old female with a past medical history of CKD, HTN, anemia, pulmonary fibrosis, tachy/bradycardia syndrome s/p permanent pacemaker placement, atrial flutter, PAF, mild to moderate MR s/p mitral clip placement, and HFpEF.      Since her hospitalization, Linda is feeling better, but is still not back to baseline. She is still SOB all of the time, but she feels her SOB has improved after restarting Lasix.  Her weight went up 5 lbs but now is back to 153.2 lbs.  She is SOB at rest, has RUSSELL with minimal exertion, and has lower extremity edema.  She is fatigued and has been taking long three hour naps daily due to feeling so fatigued. She endorses early satiety and states that her appetite is poor.      Both she and her son can't understand why she is feeling so terribly when her testing comes back negative.  Linda admits that she is slowing down and that she isn't bouncing back as quickly as she has done previously.    PMH  Past Medical History:   Diagnosis Date     Adjustment disorder with anxious mood 5/18/2015     Advanced directives, counseling/discussion 8/30/2012    Patient states has Advance Directive and will bring in a copy to clinic. 8/30/2012   Tevin May  [facility] Medical Assistant \       Anemia 9/25/2015     Basal cell cancer      CHF (congestive heart failure) (H) 9/18/2014     CKD (chronic kidney disease) stage 3, GFR 30-59 ml/min (H) 9/29/2015     DDD (degenerative disc disease), lumbar 9/25/2015     Diffuse idiopathic pulmonary fibrosis (H) 5/6/2013     Encounter for palliative care 5/18/2015      History of blood transfusion 9/29/2015     Hypertension goal BP (blood pressure) < 140/90 9/7/2012     Hypothyroid 9/7/2012     Irritable bowel syndrome 10/29/2013     Macular degeneration      Macular degeneration, 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     Nondisplaced spiral fracture of shaft of humerus      Osteoporosis 8/13/2013     Imo Update utility     RA (rheumatoid arthritis) (H) 5/7/2013     Rheumatic fever      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 2/20/2019     Shingles      Spinal stenosis of lumbar region with neurogenic claudication 9/14/2015       Past Surgical History:   Procedure Laterality Date     ANESTHESIA CARDIOVERSION N/A 9/14/2018    Procedure: ANESTHESIA CARDIOVERSION;;  Surgeon: GENERIC ANESTHESIA PROVIDER;  Location: [facility] OR     ANESTHESIA CARDIOVERSION N/A 10/11/2018    Procedure: ANESTHESIA CARDIOVERSION;  Cardioversion;  Surgeon: GENERIC ANESTHESIA PROVIDER;  Location: [facility] OR     ANESTHESIA CARDIOVERSION N/A 10/16/2018    Procedure: Anesthesia Cardioversion ;  Surgeon: GENERIC ANESTHESIA PROVIDER;  [facility]     APPENDECTOMY       BIOPSY      hemorrhoidectomy     CV HEART CATHETERIZATION WITH POSSIBLE INTERVENTION N/A 1/31/2019    Procedure: CORS,LHC,RHC-YOLANDA BEFORE CATH APPOINTMENT;  Surgeon: Avila Watson MD;  Location:  HEART CARDIAC CATH LAB     CV MITRACLIP N/A 2/11/2019    Procedure: Mitraclip Procedure;  Surgeon: Avila Watson MD;  Location: [facility] OR     ENT SURGERY      tonsillectomy     GYN SURGERY      3 D & C's     HYSTERECTOMY, PAP NO LONGER INDICATED       LAMINECTOMY LUMBAR ONE LEVEL N/A 10/13/2015    Procedure: LAMINECTOMY LUMBAR ONE LEVEL;  Surgeon: Fransico Toussaint MD;  Location:  OR       Family History   Problem Relation Age of Onset     Hypertension Mother      Psychotic Disorder Father      Diabetes Son      Diabetes Daughter      Blood Disease Daughter        Social History     Socioeconomic History     Marital status:      Spouse name: None      Number of children: None     Years of education: None     Highest education level: None   Occupational History     None   Social Needs     Financial resource strain: None     Food insecurity:     Worry: None     Inability: None     Transportation needs:     Medical: None     Non-medical: None   Tobacco Use     Smoking status: Never Smoker     Smokeless tobacco: Never Used   Substance and Sexual Activity     Alcohol use: Yes     Comment: rare wine      Drug use: No     Sexual activity: Never   Lifestyle     Physical activity:     Days per week: 0 days     Minutes per session: 0 min     Stress: None   Relationships     Social connections:     Talks on phone: None     Gets together: None     Attends Confucianism service: None     Active member of club or organization: None     Attends meetings of clubs or organizations: None     Relationship status: None     Intimate partner violence:     Fear of current or ex partner: None     Emotionally abused: None     Physically abused: None     Forced sexual activity: None   Other Topics Concern     Parent/sibling w/ CABG, MI or angioplasty before 65F 55M? No   Social History Narrative    . Has six children. She enjoys bridge and genealogy.  passed away.  Her son has financial and alcohol issues.       ALLERGIES  Allergies   Allergen Reactions     Cephalexin Diarrhea and GI Disturbance     Other reaction(s): GI Upset       Cephalexin Hcl Diarrhea     Gabapentin Other (See Comments)     Dizzsiness  Shaky, dizzy, dry mouth  Dizzsiness  Shaky,dry mouth       Methotrexate Other (See Comments)     Depleted Folic Acid  And caused severe leg cramps  Severe leg cramps     Naproxen GI Disturbance, Other (See Comments) and Nausea     Constipation. Tolerates ibuprofen.       Perfume      Sulfa Drugs Shortness Of Breath     Throat swelling     Alprazolam Other (See Comments)     Dizziness      Levofloxacin GI Disturbance     Macrobid [Nitrofurantoin Anhydrous]      Possibly  related to lung disease      Nitrofurantoin      Possibly related to lung disease      Seasonal Allergies      Ciprofloxacin Itching and Rash       MEDICATIONS amoxicillin-clavulanate (AUGMENTIN) 500-125 MG tablet, Take 1 tablet by mouth every 12 hours  apixaban ANTICOAGULANT (ELIQUIS ANTICOAGULANT) 2.5 MG tablet, Take 1 tablet (2.5 mg) by mouth 2 times daily  aspirin 81 MG EC tablet, Take 81 mg by mouth daily  azaTHIOprine (IMURAN) 50 MG tablet, Take 1 tablet (50 mg) by mouth daily  calcium carbonate-vitamin D (OSCAL W/D) 500-200 MG-UNIT tablet, Take 1 tablet by mouth daily and an additional tablet every other day  carvedilol (COREG) 3.125 MG tablet, Take 1 tablet (3.125 mg) by mouth 2 times daily (with meals)  COMPOUNDED NON-CONTROLLED SUBSTANCE (CMPD RX) - PHARMACY TO MIX COMPOUNDED MEDICATION, Estriol 1mg/gram. Place 1 gram vaginally daily for 2 weeks. Then vaginally twice weekly (Patient taking differently: Place 1 g vaginally every 48 hours Estriol 1mg/gram cream)  Cranberry 400 MG TABS, Take 1 tablet by mouth daily  denosumab (PROLIA) 60 MG/ML SOSY injection, Inject 60 mg Subcutaneous every 6 months  fish oil-omega-3 fatty acids 1000 MG capsule, Take 1 g by mouth 2 times daily  furosemide (LASIX) 20 MG tablet, Take 2 tablets (40 mg) by mouth daily  hydrALAZINE (APRESOLINE) 10 MG tablet, Take 2 tablets (20 mg) by mouth 3 times daily  isosorbide mononitrate (IMDUR) 60 MG 24 hr tablet, Take 1 tablet (60 mg) by mouth daily  levothyroxine (SYNTHROID/LEVOTHROID) 75 MCG tablet, TAKE 1 TABLET BY MOUTH EVERY DAY  Multiple Vitamins-Minerals (PRESERVISION AREDS) CAPS, Take 1 capsule by mouth 2 times daily  nitroGLYcerin (NITROSTAT) 0.4 MG sublingual tablet, Place 1 tablet (0.4 mg) under the tongue every 5 minutes as needed for chest pain  order for DME, Dispense SP Nam-alber[place]  order for DME, Equipment being ordered: Dispense baffle, for use with nebulizer.  order for DME, Equipment being ordered: Nebulizer. Use with  Albuterol.  order for DME, Equipment being ordered: Dispense face mask.  Mrs. Spicer is immunosuppressed due to rheumatoid arthritis.  potassium chloride ER (K-DUR/KLOR-CON M) 20 MEQ CR tablet, Take 2 tablets (40 mEq) by mouth daily (Patient taking differently: Take 20 mEq by mouth 2 times daily )  ranitidine (ZANTAC) 150 MG tablet, Take 150 mg by mouth daily  saccharomyces boulardii (FLORASTOR) 250 MG capsule, Take 250 mg by mouth daily  senna-docusate (SENOKOT-S/PERICOLACE) 8.6-50 MG tablet, Take 1 tablet by mouth daily as needed for constipation  trimethoprim (TRIMPEX) 100 MG tablet, Take 0.5 tablets (50 mg) by mouth daily  vitamin C (ASCORBIC ACID) 250 MG tablet, Take 250 mg by mouth daily    No current facility-administered medications on file prior to visit.       ROS:  Constitutional: Denies fever, chills, or diaphoresis. +Fatigue and weight gain.  Respiratory:  See HPI.     Cardiovascular: As per HPI.   GI: Denies nausea, vomiting, diarrhea, hematemesis, melena, or hematochezia.   : See HPI   Integument: Negative for bruising, rash, or pruritis.  Psychiatric: +anxiety and depression, controlled  Neuro: Negative for headaches, syncope, numbness or tingling.   Endocrinology:  +Hypothyroidism  Musculoskeletal:  +gait instability and muscle weakness    EXAM  /65 (BP Location: Right arm, Patient Position: Sitting, Cuff Size: Adult Large)   Pulse 87   Wt 71.7 kg (158 lb)   LMP  (LMP Unknown)   SpO2 99%   BMI 27.55 kg/m     General: Appears pale, unwell, and fatigued.  Head: normocephalic, atraumatic  Eyes: anicteric sclera, EOMI  Neck: Neck supple. No masses.  Orophyarynx: moist mucosa, no cyanosis  Heart: regular, S1/S2, no murmur, gallop, rub, estimated JVP 11 cm  Lungs: Respirations slightly labored at rest, Lungs clear bilaterally. No rhonchi or wheezing  Abdomen: soft, non-tender, bowel sounds present, no hepatomegaly  Extremities: 2+ pitting edema.    Neurological: normal speech and affect, no  gross motor deficits  Skin: Warm.  No ecchymosis or lesions.    LABS  Last Comprehensive Metabolic Panel:  Sodium   Date Value Ref Range Status   11/20/2019 137 133 - 144 mmol/L Final     Potassium   Date Value Ref Range Status   11/20/2019 4.6 3.4 - 5.3 mmol/L Final     Chloride   Date Value Ref Range Status   11/20/2019 104 94 - 109 mmol/L Final     Carbon Dioxide   Date Value Ref Range Status   11/20/2019 27 20 - 32 mmol/L Final     Anion Gap   Date Value Ref Range Status   11/20/2019 6 3 - 14 mmol/L Final     Glucose   Date Value Ref Range Status   11/20/2019 107 (H) 70 - 99 mg/dL Final     Urea Nitrogen   Date Value Ref Range Status   11/20/2019 31 (H) 7 - 30 mg/dL Final     Creatinine   Date Value Ref Range Status   11/20/2019 1.54 (H) 0.52 - 1.04 mg/dL Final     GFR Estimate   Date Value Ref Range Status   11/20/2019 30 (L) >60 mL/min/[1.73_m2] Final     Comment:     Non  GFR Calc  Starting 12/18/2018, serum creatinine based estimated GFR (eGFR) will be   calculated using the Chronic Kidney Disease Epidemiology Collaboration   (CKD-EPI) equation.       Calcium   Date Value Ref Range Status   11/20/2019 9.0 8.5 - 10.1 mg/dL Final       MOST RECENT ECHOCARDIOGRAM 9/25/19:  Interpretation Summary  Global and regional left ventricular function is normal with an EF of 60-65%.  Right ventricular function, chamber size, wall motion, and thickness are  normal.  S/P Mitral clip.Mean mitral gradient 4.5mmHg at heartrate 70BPM. Trivial  residual MR.  The inferior vena cava is normal.  No pericardial effusion is present.  ____________________________      ASSESSMENT AND PLAN  Ms. Linda Spicer is an 88 year old female with HFpEF who appears hypervolemic today.  I increased her Lasix to 40 mg twice daily. She will repeat a BMP next week to reassess her electrolytes.  I will also repeat an echo to reassess her volume status and to look for any changes from her last echo.  I have also placed a palliative  care referral to help with symptom management and to outline goals of care.      We had another lengthy discussion today that she had end stage HF and that our options are medical management and symptom management at this point.  She is not ready to transition to hospice at this point, but is willing to see palliative care to help with symptom management. A referral has been placed.    1. Chronic HFpEF (EF 60-65%). Risk factors include female gender, age, HTN, obesity and arrhythmia  The mainstays of therapy for HFpEF include volume management, blood pressure control, treating underlying sleep apnea if present, treatment of underlying CAD if within the goals of care, weight management, exercise training, rate control for atrial fibrillation as well as consideration for a rhythm control strategy, and consideration for clinical trials. Consideration of spironolactone in low risk patients should be taken given the positive CHF results in the TOPCAT trial.  Stage D. NYHA Class IV  Primary Cardiologist: Dr. Loza; Last seen 3/13/19  BP:  Hydralazine 20 mg three times daily.  Imdur 60 mg daily.  Fluid status Hypervolemic  Aldosterone antagonist: no, deferred due to renal dysfunction  Ischemia evaluation: Complete Negative stress test 2014  ACEi/ARB/ARNI: n/a, no evidence for use in HFpEF  BB: Carvedilol 3.125 mg twice daily.  SCD prophylaxis: n/a, no evidence for use in HFpEF  NSAID use: Contraindicated  Sleep apnea evaluation: Refused    Remote PA Pressure Monitoring (CardioMems) Not in goals of care  Remote monitoring:  MyChart   Anticoagulation: Apixaban 2.5 mg twice daily.  Antiplatelet: None    2. Atrial Fibrillation:  Anticoagulation: Apixaban 2.5 mg twice daily    Rate control:  Coreg 3.125 mg twice daily.  ChadsVasc Score: 4  HR  87     3.  Tachy-Jadiel syndrome s/p PPM:  Followed by EP. Device checks per protocol.     4.  CKD Stage IV:  Creatinine today 1.54. Baseline per record review is 1.4. Suspect  cardiorenal.  Diurese as outlined above.     5. Mitral regurgitation:  S/p Mitraclip.  Followed by Dr. Loza.  Follow up with him per previous recommendations.      6.  Advanced care planning:  Not ready to transition to hospice.  Palliative care referral placed for symptom management.    7.  Follow up:  BMP and echo next week.  Dr. Loza in March 2020.  Device check in May.    >40 minutes spent face to face with patient with >50% in counseling, education, and coordination of care       Karla MOELLER, CNP  [facility]

## 2019-11-24 ENCOUNTER — APPOINTMENT (OUTPATIENT)
Dept: ULTRASOUND IMAGING | Facility: CLINIC | Age: 84
End: 2019-11-24
Attending: EMERGENCY MEDICINE
Payer: MEDICARE

## 2019-11-24 ENCOUNTER — HOSPITAL ENCOUNTER (OUTPATIENT)
Facility: CLINIC | Age: 84
Setting detail: OBSERVATION
Discharge: HOME | End: 2019-11-26
Attending: EMERGENCY MEDICINE | Admitting: FAMILY MEDICINE
Payer: MEDICARE

## 2019-11-24 DIAGNOSIS — R10.13 EPIGASTRIC PAIN: ICD-10-CM

## 2019-11-24 DIAGNOSIS — I50.33 ACUTE ON CHRONIC DIASTOLIC HEART FAILURE (H): ICD-10-CM

## 2019-11-24 DIAGNOSIS — R09.89 AIR HUNGER: Primary | ICD-10-CM

## 2019-11-24 DIAGNOSIS — R10.13 ABDOMINAL PAIN, EPIGASTRIC: ICD-10-CM

## 2019-11-24 DIAGNOSIS — N39.0 CHRONIC UTI: ICD-10-CM

## 2019-11-24 PROBLEM — R10.9 ABDOMINAL PAIN: Status: ACTIVE | Noted: 2019-11-24

## 2019-11-24 LAB
ALBUMIN SERPL-MCNC: 3.4 G/DL (ref 3.4–5)
ALBUMIN UR-MCNC: NEGATIVE MG/DL
ALP SERPL-CCNC: 44 U/L (ref 40–150)
ALT SERPL W P-5'-P-CCNC: 22 U/L (ref 0–50)
ANION GAP SERPL CALCULATED.3IONS-SCNC: 6 MMOL/L (ref 3–14)
APPEARANCE UR: CLEAR
AST SERPL W P-5'-P-CCNC: 42 U/L (ref 0–45)
BASOPHILS # BLD AUTO: 0.1 10E9/L (ref 0–0.2)
BASOPHILS NFR BLD AUTO: 0.7 %
BILIRUB SERPL-MCNC: 0.8 MG/DL (ref 0.2–1.3)
BILIRUB UR QL STRIP: NEGATIVE
BUN SERPL-MCNC: 38 MG/DL (ref 7–30)
CALCIUM SERPL-MCNC: 9 MG/DL (ref 8.5–10.1)
CHLORIDE SERPL-SCNC: 101 MMOL/L (ref 94–109)
CO2 BLDCOV-SCNC: 28 MMOL/L (ref 21–28)
CO2 SERPL-SCNC: 27 MMOL/L (ref 20–32)
COLOR UR AUTO: NORMAL
CREAT SERPL-MCNC: 1.73 MG/DL (ref 0.52–1.04)
CRP SERPL-MCNC: 2.9 MG/L (ref 0–8)
DIFFERENTIAL METHOD BLD: ABNORMAL
EOSINOPHIL # BLD AUTO: 0.2 10E9/L (ref 0–0.7)
EOSINOPHIL NFR BLD AUTO: 2.9 %
ERYTHROCYTE [DISTWIDTH] IN BLOOD BY AUTOMATED COUNT: 13.2 % (ref 10–15)
ERYTHROCYTE [SEDIMENTATION RATE] IN BLOOD BY WESTERGREN METHOD: 32 MM/H (ref 0–30)
GFR SERPL CREATININE-BSD FRML MDRD: 26 ML/MIN/{1.73_M2}
GLUCOSE SERPL-MCNC: 109 MG/DL (ref 70–99)
GLUCOSE UR STRIP-MCNC: NEGATIVE MG/DL
HCT VFR BLD AUTO: 37.6 % (ref 35–47)
HGB BLD-MCNC: 12.3 G/DL (ref 11.7–15.7)
HGB UR QL STRIP: NEGATIVE
IMM GRANULOCYTES # BLD: 0 10E9/L (ref 0–0.4)
IMM GRANULOCYTES NFR BLD: 0.4 %
INTERPRETATION ECG - MUSE: NORMAL
KETONES UR STRIP-MCNC: NEGATIVE MG/DL
LACTATE BLD-SCNC: 1.4 MMOL/L (ref 0.7–2.1)
LEUKOCYTE ESTERASE UR QL STRIP: NEGATIVE
LIPASE SERPL-CCNC: 94 U/L (ref 73–393)
LYMPHOCYTES # BLD AUTO: 0.9 10E9/L (ref 0.8–5.3)
LYMPHOCYTES NFR BLD AUTO: 13.3 %
MCH RBC QN AUTO: 32.9 PG (ref 26.5–33)
MCHC RBC AUTO-ENTMCNC: 32.7 G/DL (ref 31.5–36.5)
MCV RBC AUTO: 101 FL (ref 78–100)
MONOCYTES # BLD AUTO: 0.7 10E9/L (ref 0–1.3)
MONOCYTES NFR BLD AUTO: 9.7 %
NEUTROPHILS # BLD AUTO: 4.9 10E9/L (ref 1.6–8.3)
NEUTROPHILS NFR BLD AUTO: 73 %
NITRATE UR QL: NEGATIVE
NRBC # BLD AUTO: 0 10*3/UL
NRBC BLD AUTO-RTO: 0 /100
NT-PROBNP SERPL-MCNC: 3011 PG/ML (ref 0–1800)
PCO2 BLDV: 41 MM HG (ref 40–50)
PH BLDV: 7.44 PH (ref 7.32–7.43)
PH UR STRIP: 6.5 PH (ref 5–7)
PLATELET # BLD AUTO: 311 10E9/L (ref 150–450)
PO2 BLDV: 24 MM HG (ref 25–47)
POTASSIUM SERPL-SCNC: 5.1 MMOL/L (ref 3.4–5.3)
PROT SERPL-MCNC: 7.7 G/DL (ref 6.8–8.8)
RBC # BLD AUTO: 3.74 10E12/L (ref 3.8–5.2)
SAO2 % BLDV FROM PO2: 44 %
SODIUM SERPL-SCNC: 133 MMOL/L (ref 133–144)
SOURCE: NORMAL
SP GR UR STRIP: 1.01 (ref 1–1.03)
TROPONIN I BLD-MCNC: 0.03 UG/L (ref 0–0.08)
UROBILINOGEN UR STRIP-MCNC: NORMAL MG/DL (ref 0–2)
WBC # BLD AUTO: 6.8 10E9/L (ref 4–11)

## 2019-11-24 PROCEDURE — 83605 ASSAY OF LACTIC ACID: CPT

## 2019-11-24 PROCEDURE — 93005 ELECTROCARDIOGRAM TRACING: CPT

## 2019-11-24 PROCEDURE — 93010 ELECTROCARDIOGRAM REPORT: CPT | Mod: Z6 | Performed by: EMERGENCY MEDICINE

## 2019-11-24 PROCEDURE — 82803 BLOOD GASES ANY COMBINATION: CPT

## 2019-11-24 PROCEDURE — 25000132 ZZH RX MED GY IP 250 OP 250 PS 637: Mod: GY | Performed by: FAMILY MEDICINE

## 2019-11-24 PROCEDURE — 83880 ASSAY OF NATRIURETIC PEPTIDE: CPT | Performed by: EMERGENCY MEDICINE

## 2019-11-24 PROCEDURE — 83690 ASSAY OF LIPASE: CPT | Performed by: EMERGENCY MEDICINE

## 2019-11-24 PROCEDURE — 99285 EMERGENCY DEPT VISIT HI MDM: CPT | Mod: 25

## 2019-11-24 PROCEDURE — 80053 COMPREHEN METABOLIC PANEL: CPT | Performed by: EMERGENCY MEDICINE

## 2019-11-24 PROCEDURE — 84484 ASSAY OF TROPONIN QUANT: CPT

## 2019-11-24 PROCEDURE — 99285 EMERGENCY DEPT VISIT HI MDM: CPT | Mod: 25 | Performed by: EMERGENCY MEDICINE

## 2019-11-24 PROCEDURE — 81003 URINALYSIS AUTO W/O SCOPE: CPT | Performed by: EMERGENCY MEDICINE

## 2019-11-24 PROCEDURE — 85025 COMPLETE CBC W/AUTO DIFF WBC: CPT | Performed by: EMERGENCY MEDICINE

## 2019-11-24 PROCEDURE — G0378 HOSPITAL OBSERVATION PER HR: HCPCS

## 2019-11-24 PROCEDURE — 85652 RBC SED RATE AUTOMATED: CPT | Performed by: EMERGENCY MEDICINE

## 2019-11-24 PROCEDURE — 76705 ECHO EXAM OF ABDOMEN: CPT

## 2019-11-24 PROCEDURE — 86140 C-REACTIVE PROTEIN: CPT | Performed by: EMERGENCY MEDICINE

## 2019-11-24 RX ORDER — ONDANSETRON 4 MG/1
4 TABLET, ORALLY DISINTEGRATING ORAL EVERY 6 HOURS PRN
Status: DISCONTINUED | OUTPATIENT
Start: 2019-11-24 | End: 2019-11-26 | Stop reason: HOSPADM

## 2019-11-24 RX ORDER — ACETAMINOPHEN 650 MG/1
650 SUPPOSITORY RECTAL EVERY 4 HOURS PRN
Status: DISCONTINUED | OUTPATIENT
Start: 2019-11-24 | End: 2019-11-26 | Stop reason: HOSPADM

## 2019-11-24 RX ORDER — AMOXICILLIN 250 MG
1 CAPSULE ORAL DAILY PRN
Status: DISCONTINUED | OUTPATIENT
Start: 2019-11-24 | End: 2019-11-26 | Stop reason: HOSPADM

## 2019-11-24 RX ORDER — TRIMETHOPRIM 100 MG/1
50 TABLET ORAL DAILY
Status: DISCONTINUED | OUTPATIENT
Start: 2019-11-25 | End: 2019-11-26 | Stop reason: HOSPADM

## 2019-11-24 RX ORDER — PANTOPRAZOLE SODIUM 40 MG/1
40 TABLET, DELAYED RELEASE ORAL
Status: DISCONTINUED | OUTPATIENT
Start: 2019-11-25 | End: 2019-11-26 | Stop reason: HOSPADM

## 2019-11-24 RX ORDER — HYDRALAZINE HYDROCHLORIDE 10 MG/1
20 TABLET, FILM COATED ORAL 3 TIMES DAILY
Status: DISCONTINUED | OUTPATIENT
Start: 2019-11-24 | End: 2019-11-26 | Stop reason: HOSPADM

## 2019-11-24 RX ORDER — ASPIRIN 81 MG/1
81 TABLET, CHEWABLE ORAL DAILY
Status: DISCONTINUED | OUTPATIENT
Start: 2019-11-25 | End: 2019-11-26 | Stop reason: HOSPADM

## 2019-11-24 RX ORDER — AZATHIOPRINE 50 MG/1
50 TABLET ORAL DAILY
Status: DISCONTINUED | OUTPATIENT
Start: 2019-11-25 | End: 2019-11-26 | Stop reason: HOSPADM

## 2019-11-24 RX ORDER — CHLORAL HYDRATE 500 MG
1 CAPSULE ORAL 2 TIMES DAILY
Status: DISCONTINUED | OUTPATIENT
Start: 2019-11-24 | End: 2019-11-26 | Stop reason: HOSPADM

## 2019-11-24 RX ORDER — ISOSORBIDE MONONITRATE 30 MG/1
60 TABLET, EXTENDED RELEASE ORAL DAILY
Status: DISCONTINUED | OUTPATIENT
Start: 2019-11-25 | End: 2019-11-26 | Stop reason: HOSPADM

## 2019-11-24 RX ORDER — NALOXONE HYDROCHLORIDE 0.4 MG/ML
.1-.4 INJECTION, SOLUTION INTRAMUSCULAR; INTRAVENOUS; SUBCUTANEOUS
Status: DISCONTINUED | OUTPATIENT
Start: 2019-11-24 | End: 2019-11-26 | Stop reason: HOSPADM

## 2019-11-24 RX ORDER — SACCHAROMYCES BOULARDII 250 MG
250 CAPSULE ORAL DAILY
Status: DISCONTINUED | OUTPATIENT
Start: 2019-11-25 | End: 2019-11-26 | Stop reason: HOSPADM

## 2019-11-24 RX ORDER — ONDANSETRON 2 MG/ML
4 INJECTION INTRAMUSCULAR; INTRAVENOUS EVERY 6 HOURS PRN
Status: DISCONTINUED | OUTPATIENT
Start: 2019-11-24 | End: 2019-11-26 | Stop reason: HOSPADM

## 2019-11-24 RX ORDER — GLUCOSAMINE HCL 500 MG
1 TABLET ORAL DAILY
Status: DISCONTINUED | OUTPATIENT
Start: 2019-11-25 | End: 2019-11-24

## 2019-11-24 RX ORDER — ACETAMINOPHEN 325 MG/1
650 TABLET ORAL EVERY 4 HOURS PRN
Status: DISCONTINUED | OUTPATIENT
Start: 2019-11-24 | End: 2019-11-26 | Stop reason: HOSPADM

## 2019-11-24 RX ORDER — CARVEDILOL 3.12 MG/1
3.12 TABLET ORAL 2 TIMES DAILY WITH MEALS
Status: DISCONTINUED | OUTPATIENT
Start: 2019-11-24 | End: 2019-11-26 | Stop reason: HOSPADM

## 2019-11-24 RX ORDER — POTASSIUM CHLORIDE 750 MG/1
20 TABLET, EXTENDED RELEASE ORAL 2 TIMES DAILY
Status: DISCONTINUED | OUTPATIENT
Start: 2019-11-24 | End: 2019-11-26 | Stop reason: HOSPADM

## 2019-11-24 RX ORDER — VIT C/E/ZN/COPPR/LUTEIN/ZEAXAN 60 MG-6 MG
1 CAPSULE ORAL 2 TIMES DAILY
Status: DISCONTINUED | OUTPATIENT
Start: 2019-11-24 | End: 2019-11-26 | Stop reason: HOSPADM

## 2019-11-24 RX ADMIN — Medication 1 CAPSULE: at 21:40

## 2019-11-24 RX ADMIN — HYDRALAZINE HYDROCHLORIDE 20 MG: 10 TABLET, FILM COATED ORAL at 21:18

## 2019-11-24 RX ADMIN — Medication 1 G: at 21:40

## 2019-11-24 RX ADMIN — POTASSIUM CHLORIDE 20 MEQ: 750 TABLET, EXTENDED RELEASE ORAL at 21:18

## 2019-11-24 RX ADMIN — CARVEDILOL 3.12 MG: 3.12 TABLET, FILM COATED ORAL at 21:40

## 2019-11-24 RX ADMIN — APIXABAN 2.5 MG: 2.5 TABLET, FILM COATED ORAL at 21:18

## 2019-11-24 ASSESSMENT — ENCOUNTER SYMPTOMS
HEMATURIA: 0
VOMITING: 0
FREQUENCY: 0
ABDOMINAL PAIN: 1
FATIGUE: 1
MYALGIAS: 0
CHEST TIGHTNESS: 0
DIFFICULTY URINATING: 0
SHORTNESS OF BREATH: 1
APPETITE CHANGE: 1
DIARRHEA: 0
FEVER: 0
NAUSEA: 1
DYSURIA: 0

## 2019-11-24 ASSESSMENT — ACTIVITIES OF DAILY LIVING (ADL)
RETIRED_EATING: 0-->INDEPENDENT
RETIRED_COMMUNICATION: 0-->UNDERSTANDS/COMMUNICATES WITHOUT DIFFICULTY
TOILETING: 0-->INDEPENDENT
COGNITION: 0 - NO COGNITION ISSUES REPORTED
TRANSFERRING: 1-->ASSISTIVE EQUIPMENT
SWALLOWING: 0-->SWALLOWS FOODS/LIQUIDS WITHOUT DIFFICULTY
BATHING: 0-->INDEPENDENT
DRESS: 0-->INDEPENDENT
FALL_HISTORY_WITHIN_LAST_SIX_MONTHS: NO
AMBULATION: 1-->ASSISTIVE EQUIPMENT

## 2019-11-24 ASSESSMENT — MIFFLIN-ST. JEOR: SCORE: 1088.6

## 2019-11-24 NOTE — H&P
Attestation:  This patient has been seen and evaluated by me, Elsa Jeffrey DO on 11/24/19.  I saw and discussed the case with the primary resident and the care team. I agree with the findings and plan in this note. I have reviewed today's vital signs, medications, laboratory results and imaging results.     Elsa Jeffrey DO   [facility] History and Physical        Date of Admission:  11/24/2019  Date of Service: 11/24/2019         HPI      Chief Complaint   Abdominal pain    History is obtained from the patient    History of Present Illness   Linda is a 88 year old female who has a history of HFpEF, MR s/p mitral clip placement, paroxysmal a fib, tachy/lisa syndrome s/p pacemaker, RA with associated RA-ILD, CKD III, and hypothyroidism and is admitted for abdominal pain and shortness of breath. She was recently admitted from 11/14 - 11/16 with sigmoid diverticulitis, confirmed on CT, and treated with IV Zosyn before switching to Augmentin for a 10 day course.  She also had an NEIL during that admission and her Lasix was held. Subsequently has followed up with PCP and cardiology. Resumed lasix at 40 mg in am and 20 mg PM then after cardiology visit on 11/21 increased to 40 mg BID.     The patient reports that over the past 3 days she has been experiencing increasing fatigue and epigastric pain. She notes associated symptoms of decreased appetite, nausea, but no emesis. The patient describes the pain as a irritating pain which does have some radiation down to her pelvis but is greatest in the epigastrium. Her shortness of breath has continued to be worsened from her baseline with no associated increase in cough or sputum production. The patient has not noted any black or tarry stools.     ED Course: Hemodynamically stable, afebrile. Lab workup notable for Cr 1.73 (baseline 1.4), BNP 3,011 (up from 2884 9/25/19), RUQ US w/  no cholecystitis. UA negative. Trop 0.03.          History:   Review of Systems   The 10 point Review of Systems is negative other than noted in the HPI or here.     Past Medical History    I have reviewed this patient's medical history and updated it with pertinent information if needed.   Past Medical History:   Diagnosis Date     Adjustment disorder with anxious mood 5/18/2015     Advanced directives, counseling/discussion 8/30/2012    Patient states has Advance Directive and will bring in a copy to clinic. 8/30/2012   [facility] Medical Assistant \       Anemia 9/25/2015     Basal cell cancer      CHF (congestive heart failure) (H) 9/18/2014     CKD (chronic kidney disease) stage 3, GFR 30-59 ml/min (H) 9/29/2015     DDD (degenerative disc disease), lumbar 9/25/2015     Diffuse idiopathic pulmonary fibrosis (H) 5/6/2013     Encounter for palliative care 5/18/2015     History of blood transfusion 9/29/2015     Hypertension goal BP (blood pressure) < 140/90 9/7/2012     Hypothyroid 9/7/2012     Irritable bowel syndrome 10/29/2013     Macular degeneration      Macular degeneration, 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     Nondisplaced spiral fracture of shaft of humerus      Osteoporosis 8/13/2013     Imo Update utility     RA (rheumatoid arthritis) (H) 5/7/2013     Rheumatic fever      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 2/20/2019     Shingles      Spinal stenosis of lumbar region with neurogenic claudication 9/14/2015        Past Surgical History   I have reviewed this patient's surgical history and updated it with pertinent information if needed.  Past Surgical History:   Procedure Laterality Date     ANESTHESIA CARDIOVERSION N/A 9/14/2018    Procedure: ANESTHESIA CARDIOVERSION;;  Surgeon: GENERIC ANESTHESIA PROVIDER;  Location: [facility] OR     ANESTHESIA CARDIOVERSION N/A 10/11/2018    Procedure: ANESTHESIA CARDIOVERSION;  Cardioversion;  Surgeon: GENERIC ANESTHESIA PROVIDER;  Location: [facility] OR     ANESTHESIA  CARDIOVERSION N/A 10/16/2018    Procedure: Anesthesia Cardioversion ;  Surgeon: GENERIC ANESTHESIA PROVIDER;  Location: [facility] OR     APPENDECTOMY       BIOPSY      hemorrhoidectomy     CV HEART CATHETERIZATION WITH POSSIBLE INTERVENTION N/A 1/31/2019    Procedure: CORS,LHC,RHC-YOLANDA BEFORE CATH APPOINTMENT;  Surgeon: Avila Watson MD;  Location:  HEART CARDIAC CATH LAB     CV MITRACLIP N/A 2/11/2019    Procedure: Mitraclip Procedure;  Surgeon: Avila Watson MD;  Location:  OR     ENT SURGERY      tonsillectomy     GYN SURGERY      3 D & C's     HYSTERECTOMY, PAP NO LONGER INDICATED       LAMINECTOMY LUMBAR ONE LEVEL N/A 10/13/2015    Procedure: LAMINECTOMY LUMBAR ONE LEVEL;  Surgeon: Fransico Toussaint MD;  Location:  OR        Social History   Social History     Tobacco Use     Smoking status: Never Smoker     Smokeless tobacco: Never Used   Substance Use Topics     Alcohol use: Yes     Comment: rare wine      Drug use: No       Family History   I have reviewed this patient's family history and updated it with pertinent information if needed.   Family History   Problem Relation Age of Onset     Hypertension Mother      Psychotic Disorder Father      Diabetes Son      Diabetes Daughter      Blood Disease Daughter        Prior to Admission Medications   Prior to Admission Medications   Prescriptions Last Dose Informant Patient Reported? Taking?   COMPOUNDED NON-CONTROLLED SUBSTANCE (CMPD RX) - PHARMACY TO MIX COMPOUNDED MEDICATION   No No   Sig: Estriol 1mg/gram. Place 1 gram vaginally daily for 2 weeks. Then vaginally twice weekly   Patient taking differently: Place 1 g vaginally every 48 hours Estriol 1mg/gram cream   Cranberry 400 MG TABS   Yes No   Sig: Take 1 tablet by mouth daily   Multiple Vitamins-Minerals (PRESERVISION AREDS) CAPS   No No   Sig: Take 1 capsule by mouth 2 times daily   amoxicillin-clavulanate (AUGMENTIN) 500-125 MG tablet   No No   Sig: Take 1 tablet by mouth every 12 hours    apixaban ANTICOAGULANT (ELIQUIS ANTICOAGULANT) 2.5 MG tablet   No No   Sig: Take 1 tablet (2.5 mg) by mouth 2 times daily   aspirin 81 MG EC tablet   Yes No   Sig: Take 81 mg by mouth daily   azaTHIOprine (IMURAN) 50 MG tablet   No No   Sig: Take 1 tablet (50 mg) by mouth daily   calcium carbonate-vitamin D (OSCAL W/D) 500-200 MG-UNIT tablet   Yes No   Sig: Take 1 tablet by mouth daily and an additional tablet every other day   carvedilol (COREG) 3.125 MG tablet   No No   Sig: Take 1 tablet (3.125 mg) by mouth 2 times daily (with meals)   denosumab (PROLIA) 60 MG/ML SOSY injection   Yes No   Sig: Inject 60 mg Subcutaneous every 6 months   fish oil-omega-3 fatty acids 1000 MG capsule   Yes No   Sig: Take 1 g by mouth 2 times daily   furosemide (LASIX) 20 MG tablet   No No   Sig: Take 2 tablets (40 mg) by mouth 2 times daily   hydrALAZINE (APRESOLINE) 10 MG tablet   No No   Sig: Take 2 tablets (20 mg) by mouth 3 times daily   isosorbide mononitrate (IMDUR) 60 MG 24 hr tablet   No No   Sig: Take 1 tablet (60 mg) by mouth daily   levothyroxine (SYNTHROID/LEVOTHROID) 75 MCG tablet   No No   Sig: TAKE 1 TABLET BY MOUTH EVERY DAY   nitroGLYcerin (NITROSTAT) 0.4 MG sublingual tablet   No No   Sig: Place 1 tablet (0.4 mg) under the tongue every 5 minutes as needed for chest pain   order for DME   No No   Sig: Equipment being ordered: Dispense face mask.  Mrs. Spicer is immunosuppressed due to rheumatoid arthritis.   order for DME   No No   Sig: Equipment being ordered: Nebulizer. Use with Albuterol.   order for DME   No No   Sig: Equipment being ordered: Dispense baffle, for use with nebulizer.   order for DME   No No   Sig: Dispense SP Walker-small   potassium chloride ER (K-DUR/KLOR-CON M) 20 MEQ CR tablet   No No   Sig: Take 2 tablets (40 mEq) by mouth daily   Patient taking differently: Take 20 mEq by mouth 2 times daily    ranitidine (ZANTAC) 150 MG tablet   Yes No   Sig: Take 150 mg by mouth daily   saccharomyces  boulardii (FLORASTOR) 250 MG capsule   Yes No   Sig: Take 250 mg by mouth daily   senna-docusate (SENOKOT-S/PERICOLACE) 8.6-50 MG tablet   No No   Sig: Take 1 tablet by mouth daily as needed for constipation   trimethoprim (TRIMPEX) 100 MG tablet   No No   Sig: Take 0.5 tablets (50 mg) by mouth daily   Patient not taking: Reported on 11/21/2019   vitamin C (ASCORBIC ACID) 250 MG tablet   Yes No   Sig: Take 250 mg by mouth daily      Facility-Administered Medications: None     Allergies   Allergies   Allergen Reactions     Cephalexin Diarrhea and GI Disturbance     Other reaction(s): GI Upset       Cephalexin Hcl Diarrhea     Gabapentin Other (See Comments)     Dizzsiness  Shaky, dizzy, dry mouth  Dizzsiness  Shaky,dry mouth       Methotrexate Other (See Comments)     Depleted Folic Acid  And caused severe leg cramps  Severe leg cramps     Naproxen GI Disturbance, Other (See Comments) and Nausea     Constipation. Tolerates ibuprofen.       Perfume      Sulfa Drugs Shortness Of Breath     Throat swelling     Alprazolam Other (See Comments)     Dizziness      Levofloxacin GI Disturbance     Macrobid [Nitrofurantoin Anhydrous]      Possibly related to lung disease      Nitrofurantoin      Possibly related to lung disease      Seasonal Allergies      Ciprofloxacin Itching and Rash           Physical Exam      Vital Signs: Temp: 97.6  F (36.4  C) Temp src: Oral BP: 109/56 Pulse: 83 Heart Rate: 83 Resp: 18 SpO2: 99 % O2 Device: None (Room air)    Weight: 152 lbs 0 oz    Physical Exam  Constitutional:       General: She is not in acute distress.     Appearance: Normal appearance. She is normal weight. She is not ill-appearing, toxic-appearing or diaphoretic.   HENT:      Head: Normocephalic and atraumatic.      Nose: Nose normal.      Mouth/Throat:      Mouth: Mucous membranes are moist.   Eyes:      Conjunctiva/sclera: Conjunctivae normal.   Cardiovascular:      Rate and Rhythm: Normal rate and regular rhythm.      Heart  sounds: No murmur. No friction rub. No gallop.    Pulmonary:      Effort: Pulmonary effort is normal. No respiratory distress.      Breath sounds: Normal breath sounds. No stridor. No wheezing, rhonchi or rales.   Abdominal:      General: There is no distension.      Palpations: Abdomen is soft. There is no mass.      Tenderness: There is abdominal tenderness (epigastric). There is no guarding or rebound.      Hernia: No hernia is present.   Musculoskeletal:      Right lower leg: No edema.      Left lower leg: No edema.   Skin:     General: Skin is warm and dry.   Neurological:      General: No focal deficit present.      Mental Status: She is alert.   Psychiatric:         Mood and Affect: Mood normal.         Behavior: Behavior normal.         Assessment & Plan      Linda is a 88 year old female admitted on 11/24/2019. She has a history of history of HFpEF, MR s/p mitral clip placement, paroxysmal a fib, tachy/lisa syndrome s/p pacemaker, RA with associated RA-ILD, CKD III, and hypothyroidism and is admitted for abdominal pain, fatigue, and shortness of breath.     # Epigastric Abdominal Pain   # Hx of Diverticulitis   Patient has a recent history of diverticulitis and completed a course of oral antibiotics yesterday.  Now has increased epigastric abdominal pain, nausea, poor appetite.  Differential diagnosis includes gastric and duodenal causes such as ulcer or gastritis, pancreatitis, biliary causes, as well as complications of diverticulitis.  CMP, lipase, CBC unrevealing with no leukocytosis, abnormal LFTs, or lipase elevation.  Right upper quadrant ultrasound was negative for cholecystitis or biliary abnormality.  Pancreatitis unlikely with normal lipase.  Abdominal CT scan with contrast could not be performed at this time due to elevation of her creatinine.  Pain would be an unusual location for complicated diverticulitis although could be referred.  Immune response also could be suppressed given her  baseline immunosuppression with azathioprine.  -Zantac transition to protonix   -Abdominal CT scan with contrast in am if creatinine improved otherwise non-contrast scan  -Trend CBC  -Dilaudid for pain    # NEIL on CKD III-IV vs Cardiorenal   Baseline creatinine is 1.4 with increased to 1.7 today.  Is on increased dose of Lasix at home up to 40 mg twice daily.  Suspect this is more likely prerenal NEIL on top of her CKD than cardiorenal given no peripheral edema or lung findings to suggest heart failure exacerbation.  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  Recent CT scan showed no progression of disease.  Currently not hypoxic.  No increased sputum production, fever, or cough to suggest pneumonia or flare of the ILD.  -CXR    -Continue PTA trimethoprim   -Continue PTA azathioprine    # HFpEF   # Tachy/bradycardia syndrome s/p permanent pacemaker  # Aflutter/pAF on anticoagulant  # MR s/p mitral clip placement  Patient does have increased shortness of breath as noted above but does not have any other signs of heart failure exacerbation.  Last echo in September showed normal ejection fraction.  Most recent follow-up with cardiology 3 days ago had increased dose of Lasix and subsequently her creatinine has increased.  Per review of the cardiology notes she does have end-stage heart failure with preserved ejection fraction and has been considered a candidate for hospice which the patient declined.  She is interested in meeting with palliative care for discussion of symptomatic treatment.  - CXR    - ChadsVasc Score: 4  - Anticoagulation: Apixaban 2.5 mg twice daily    - Rate control: Carvedilol 3.125 mg twice daily  - I/O. Daily weight. Trend BMP.  - Continue PTA isosorbide mononitrate (IMDUR) 60 MG 24  - Hold Lasix  - Palliative care consult    #  Hypertension   -Continue home hydralazine 20 mg 3 times daily    # Hypothyroidism  - Continued PTA Levothyroxine 75 mcg/day      # Pain Assessment:  Current Pain Score 11/16/2019   Patient currently in pain? denies   Pain score (0-10) -   Pain location -   Pain descriptors -   - Linda is experiencing pain due to abd pain. Pain management was discussed and the plan was created in a collaborative fashion.  Linda's response to the current recommendations: engaged  - Please see the plan for pain management as documented above        Diet: 1 Gram Sodium Diet  Fluids: PO  DVT Prophylaxis: apixaban   Code Status: DNR    Disposition Plan   Expected discharge: 2 - 3 days; recommended to prior living arrangement once adequate pain management/ tolerating PO medications and abd pain workup complete. Dispo:          Entered: Siva Prieto 11/24/2019, 6:11 PM   Information in the above section will display in the discharge planner report.    Discussed with and examined by faculty.    [facility]  [facility]   Pager: [phone]  Please see sticky note for cross cover information      Results:     Data

## 2019-11-24 NOTE — ED TRIAGE NOTES
88F - presenting to ED-Triage for eval of generalized abdominal discomfort/pain; general malaise/fatigue/weakness; x 1 - 2 days.  Recent diverticulitis, requiring hospital admit

## 2019-11-24 NOTE — ED PROVIDER NOTES
History     Chief Complaint   Patient presents with     Abdominal Pain     for 1-2 days; recent diverticulitis     The history is provided by the patient, the spouse and medical records.     Linda Spicer is a 88 year old female with a history of hypertension, HFpEF, MR status post mitral clip placement, paroxysmal atrial fibrillation/flutter, tachy/bradycardia syndrome status post permanent pacemaker, seropositive rheumatoid arthritis with associated RA-ILD, stage III CKD, and hypothyroidism; who presents to the Emergency Department accompanied by her spouse for evaluation of abdominal pain, shortness of breath, fatigue, loss of appetite, and nausea.    Per chart review, patient was recently admitted on 11/14/19 to 11/16/19 with diffuse lower abdominal pain, and discovered to have sigmoid diverticulitis on imaging without perforation (please see impression below). She had no signs of sepsis or leukocytosis, and patient's pain was improved with acetaminophen. However, she remained inpatient for close observation given her immunosuppressed state. She was started on 2.25 g IV Zosyn q8h and switched to oral Augmentin, which she was to continue until 11/23/19 for a ten day course. Of note, patient had prerenal NEIL during her admission and her Lasix was held during that time.     Here, patient's spouse reports that her dry weight is around 154 lbs. Following her discharge, her weight increased to 158 lbs as her Lasix was held. She resumed her regular dose of 40 mg a few days after discharge and her weight went down to 153 lbs. Patient then had a follow-up visit at [facility] on 11/21/19, at which time her dose was increased to 40 mg BID and her weight this morning was 150.2. Patient reportedly has shortness of breath chronically, which will typically exacerbate during infections.    Patient has been more fatigued and lethargic in the last few days. This is associated with loss of appetite, poor oral intake, and  "nausea. She has been attempting to maintain fluid intake. Additionally, she complains of a \"raw\" sensation in her upper abdomen/periumbilical region that is different from her suprapubic discomfort related to the diverticulitis--which is still present but improved. She had her last dose of antibiotics this morning. Her last bowel movement was this morning and was slightly hard in consistency. She denies any fevers, chest pain, chest pressure/tightness, myalgias, changes in urination, hematuria, frequency, urgency, or other complaints. She denies missing any doses of her medications.       CT ABDOMEN PELVIS W/O CONTRAST (11/13/2019) IMPRESSION:  1. Sigmoid colon diverticulitis. No free air or adjacent fluid  collections to suggest perforation.  2. Decreased size of previously noted cystic pancreatic lesion no  longer requiring follow-up.  3. Cholelithiasis without evidence for acute cholecystitis.  4. Partially visualized basilar pulmonary fibrotic changes better  evaluated on prior CT chest.      I have reviewed the Medications, Allergies, Past Medical and Surgical History, and Social History in the [facility] system.    Past Medical History:   Diagnosis Date     Adjustment disorder with anxious mood 5/18/2015     Advanced directives, counseling/discussion 8/30/2012    Patient states has Advance Directive and will bring in a copy to clinic. 8/30/2012   [facility] \       Anemia 9/25/2015     Basal cell cancer      CHF (congestive heart failure) (H) 9/18/2014     CKD (chronic kidney disease) stage 3, GFR 30-59 ml/min (H) 9/29/2015     DDD (degenerative disc disease), lumbar 9/25/2015     Diffuse idiopathic pulmonary fibrosis (H) 5/6/2013     Encounter for palliative care 5/18/2015     History of blood transfusion 9/29/2015     Hypertension goal BP (blood pressure) < 140/90 9/7/2012     Hypothyroid 9/7/2012     Irritable bowel syndrome 10/29/2013     Macular degeneration      Macular " degeneration, 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     Nondisplaced spiral fracture of shaft of humerus      Osteoporosis 8/13/2013     Im[employer] Update utility     RA (rheumatoid arthritis) (H) 5/7/2013     Rheumatic fever      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 2/20/2019     Shingles      Spinal stenosis of lumbar region with neurogenic claudication 9/14/2015       Past Surgical History:   Procedure Laterality Date     ANESTHESIA CARDIOVERSION N/A 9/14/2018    Procedure: ANESTHESIA CARDIOVERSION;;  Surgeon: GENERIC ANESTHESIA PROVIDER;  Location: [facility] OR     ANESTHESIA CARDIOVERSION N/A 10/11/2018    Procedure: ANESTHESIA CARDIOVERSION;  Cardioversion;  Surgeon: GENERIC ANESTHESIA PROVIDER;  Location: [facility] OR     ANESTHESIA CARDIOVERSION N/A 10/16/2018    Procedure: Anesthesia Cardioversion ;  Surgeon: GENERIC ANESTHESIA PROVIDER;  [facility]     APPENDECTOMY       BIOPSY      hemorrhoidectomy     CV HEART CATHETERIZATION WITH POSSIBLE INTERVENTION N/A 1/31/2019    Procedure: CORS,LHC,RHC-YOLANDA BEFORE CATH APPOINTMENT;  Surgeon: Avila Watson MD;  Location:  HEART CARDIAC CATH LAB     CV MITRACLIP N/A 2/11/2019    Procedure: Mitraclip Procedure;  Surgeon: Avila Watson MD;  Location:  OR     ENT SURGERY      tonsillectomy     GYN SURGERY      3 D & C's     HYSTERECTOMY, PAP NO LONGER INDICATED       LAMINECTOMY LUMBAR ONE LEVEL N/A 10/13/2015    Procedure: LAMINECTOMY LUMBAR ONE LEVEL;  Surgeon: Fransico Toussaint MD;  Location:  OR       Family History   Problem Relation Age of Onset     Hypertension Mother      Psychotic Disorder Father      Diabetes Son      Diabetes Daughter      Blood Disease Daughter        Social History     Tobacco Use     Smoking status: Never Smoker     Smokeless tobacco: Never Used   Substance Use Topics     Alcohol use: Yes     Comment: rare wine      No current facility-administered medications for this encounter.      Current Outpatient Medications   Medication      amoxicillin-clavulanate (AUGMENTIN) 500-125 MG tablet     apixaban ANTICOAGULANT (ELIQUIS ANTICOAGULANT) 2.5 MG tablet     aspirin 81 MG EC tablet     azaTHIOprine (IMURAN) 50 MG tablet     calcium carbonate-vitamin D (OSCAL W/D) 500-200 MG-UNIT tablet     carvedilol (COREG) 3.125 MG tablet     COMPOUNDED NON-CONTROLLED SUBSTANCE (CMPD RX) - PHARMACY TO MIX COMPOUNDED MEDICATION     Cranberry 400 MG TABS     denosumab (PROLIA) 60 MG/ML SOSY injection     fish oil-omega-3 fatty acids 1000 MG capsule     furosemide (LASIX) 20 MG tablet     hydrALAZINE (APRESOLINE) 10 MG tablet     isosorbide mononitrate (IMDUR) 60 MG 24 hr tablet     levothyroxine (SYNTHROID/LEVOTHROID) 75 MCG tablet     Multiple Vitamins-Minerals (PRESERVISION AREDS) CAPS     nitroGLYcerin (NITROSTAT) 0.4 MG sublingual tablet     order for DME     order for DME     order for DME     order for DME     potassium chloride ER (K-DUR/KLOR-CON M) 20 MEQ CR tablet     ranitidine (ZANTAC) 150 MG tablet     saccharomyces boulardii (FLORASTOR) 250 MG capsule     senna-docusate (SENOKOT-S/PERICOLACE) 8.6-50 MG tablet     trimethoprim (TRIMPEX) 100 MG tablet     vitamin C (ASCORBIC ACID) 250 MG tablet        Allergies   Allergen Reactions     Cephalexin Diarrhea and GI Disturbance     Other reaction(s): GI Upset       Cephalexin Hcl Diarrhea     Gabapentin Other (See Comments)     Dizzsiness  Shaky, dizzy, dry mouth  Dizzsiness  Shaky,dry mouth       Methotrexate Other (See Comments)     Depleted Folic Acid  And caused severe leg cramps  Severe leg cramps     Naproxen GI Disturbance, Other (See Comments) and Nausea     Constipation. Tolerates ibuprofen.       Perfume      Sulfa Drugs Shortness Of Breath     Throat swelling     Alprazolam Other (See Comments)     Dizziness      Levofloxacin GI Disturbance     Macrobid [Nitrofurantoin Anhydrous]      Possibly related to lung disease      Nitrofurantoin      Possibly related to lung disease      Seasonal Allergies   "    Ciprofloxacin Itching and Rash       Review of Systems   Constitutional: Positive for appetite change and fatigue. Negative for fever.   Respiratory: Positive for shortness of breath. Negative for chest tightness.    Cardiovascular: Negative for chest pain.   Gastrointestinal: Positive for abdominal pain (new upper abdominal discomfort) and nausea. Negative for diarrhea and vomiting.   Genitourinary: Negative for difficulty urinating, dysuria, frequency, hematuria and urgency.   Musculoskeletal: Negative for myalgias.   Allergic/Immunologic: Positive for immunocompromised state.   All other systems reviewed and are negative.      Physical Exam   BP: 109/56  Pulse: 83  Heart Rate: 83  Temp: 97.6  F (36.4  C)  Resp: 18  Height: 160 cm (5' 3\")  Weight: 68.9 kg (152 lb)  SpO2: 99 %      Physical Exam  Vitals signs and nursing note reviewed.   Constitutional:       General: She is not in acute distress.     Appearance: She is well-developed. She is not diaphoretic.   HENT:      Head: Atraumatic.      Mouth/Throat:      Pharynx: No oropharyngeal exudate.   Eyes:      General: No scleral icterus.     Pupils: Pupils are equal, round, and reactive to light.   Cardiovascular:      Rate and Rhythm: Normal rate and regular rhythm.      Heart sounds: Normal heart sounds.   Pulmonary:      Effort: No respiratory distress.      Breath sounds: Normal breath sounds.   Abdominal:      General: Bowel sounds are normal.      Palpations: Abdomen is soft.      Tenderness: There is abdominal tenderness in the right upper quadrant.   Musculoskeletal:         General: No tenderness.   Skin:     General: Skin is warm.      Findings: No rash.   Neurological:      Mental Status: She is alert.         ED Course        Procedures             EKG Interpretation:      Interpreted by Miles Mendoza MD  Time reviewed: per Epic  Symptoms at time of EKG: None   Rhythm: paced  Rate: Normal  Axis: Left Axis Deviation  Ectopy: none  Conduction: " paced  ST Segments/ T Waves: Non-specific ST-T wave changes  Q Waves: none  Comparison to prior: Unchanged    Clinical Impression: paced rhythm                Critical Care time:  none             Labs Ordered and Resulted from Time of ED Arrival Up to the Time of Departure from the ED   CBC WITH PLATELETS DIFFERENTIAL - Abnormal; Notable for the following components:       Result Value    RBC Count 3.74 (*)      (*)     All other components within normal limits   COMPREHENSIVE METABOLIC PANEL - Abnormal; Notable for the following components:    Glucose 109 (*)     Urea Nitrogen 38 (*)     Creatinine 1.73 (*)     GFR Estimate 26 (*)     GFR Estimate If Black 30 (*)     All other components within normal limits   ISTAT  GASES LACTATE PHILIPPE POCT - Abnormal; Notable for the following components:    Ph Venous 7.44 (*)     PO2 Venous 24 (*)     All other components within normal limits   ERYTHROCYTE SEDIMENTATION RATE AUTO   NT PROBNP INPATIENT   CRP INFLAMMATION   LIPASE   NT PROBNP INPATIENT   UA MACROSCOPIC WITH REFLEX TO MICRO AND CULTURE   PERIPHERAL IV CATHETER   ISTAT TROPONIN NURSING POCT   ISTAT CG4 GASES LACTATE PHILIPPE NURSING POCT   TROPONIN POCT            Assessments & Plan (with Medical Decision Making)     88 year old female with a history of hypertension, HFpEF, MR status post mitral clip placement, paroxysmal atrial fibrillation/flutter, tachy/bradycardia syndrome status post permanent pacemaker, seropositive rheumatoid arthritis with associated RA-ILD, stage III CKD, and hypothyroidism, and recent hospitalization for diverticulitis with completed course of antibiotic as of this morning; who presents to the Emergency Department accompanied by her spouse for evaluation of abdominal pain, shortness of breath, fatigue, loss of appetite, and nausea.  IV established, labs drawn sent reviewed document in epic remarkable for BNP of 3000, BUN/creatinine of 38 and 1.73, sed rate downtrending at 32, CRP normal  at 2.9.  EKG obtained which revealed paced rhythm.  Patient was sent to x-ray of the chest but declined this study.  Right upper quadrant ultrasound obtained which revealed cholelithiasis without evidence of cholecystitis.  Cardiology fellow contacted for bedside echo.    Given the patient's outpatient plan to engage palliative care with consideration for home hospice versus increased home health care in her independent living facility, case discussed with cardiology staff who agreed with inpatient medicine admission with cardiology consult to complete evaluation of heart failure optimization and palliative care consult with question of transition to appropriate facility versus increasing home services.        I have reviewed the nursing notes.    I have reviewed the findings, diagnosis, plan and need for follow up with the patient.    New Prescriptions    No medications on file       Final diagnoses:   Abdominal pain, epigastric     I, Karuna Baird, am serving as a trained medical scribe to document services personally performed by Miles Mendoza MD, based on the provider's statements to me.   IMiles MD, was physically present and have reviewed and verified the accuracy of this note documented by Karuna Baird.    11/24/2019   [facility], [place], EMERGENCY DEPARTMENT     Miles Mendoza MD  11/24/19 [id number]

## 2019-11-25 ENCOUNTER — APPOINTMENT (OUTPATIENT)
Dept: GENERAL RADIOLOGY | Facility: CLINIC | Age: 84
End: 2019-11-25
Payer: MEDICARE

## 2019-11-25 ENCOUNTER — APPOINTMENT (OUTPATIENT)
Dept: CT IMAGING | Facility: CLINIC | Age: 84
End: 2019-11-25
Attending: STUDENT IN AN ORGANIZED HEALTH CARE EDUCATION/TRAINING PROGRAM
Payer: MEDICARE

## 2019-11-25 DIAGNOSIS — I50.33 ACUTE ON CHRONIC DIASTOLIC HEART FAILURE (H): Primary | ICD-10-CM

## 2019-11-25 LAB
ALBUMIN SERPL-MCNC: 3.1 G/DL (ref 3.4–5)
ALP SERPL-CCNC: 41 U/L (ref 40–150)
ALT SERPL W P-5'-P-CCNC: 17 U/L (ref 0–50)
ANION GAP SERPL CALCULATED.3IONS-SCNC: 10 MMOL/L (ref 3–14)
AST SERPL W P-5'-P-CCNC: 16 U/L (ref 0–45)
BASOPHILS # BLD AUTO: 0.1 10E9/L (ref 0–0.2)
BASOPHILS NFR BLD AUTO: 0.8 %
BILIRUB SERPL-MCNC: 0.5 MG/DL (ref 0.2–1.3)
BUN SERPL-MCNC: 35 MG/DL (ref 7–30)
CALCIUM SERPL-MCNC: 8.8 MG/DL (ref 8.5–10.1)
CHLORIDE SERPL-SCNC: 104 MMOL/L (ref 94–109)
CO2 SERPL-SCNC: 23 MMOL/L (ref 20–32)
CREAT SERPL-MCNC: 1.59 MG/DL (ref 0.52–1.04)
DIFFERENTIAL METHOD BLD: ABNORMAL
EOSINOPHIL # BLD AUTO: 0.2 10E9/L (ref 0–0.7)
EOSINOPHIL NFR BLD AUTO: 3.4 %
ERYTHROCYTE [DISTWIDTH] IN BLOOD BY AUTOMATED COUNT: 13.2 % (ref 10–15)
GFR SERPL CREATININE-BSD FRML MDRD: 29 ML/MIN/{1.73_M2}
GLUCOSE SERPL-MCNC: 88 MG/DL (ref 70–99)
HCT VFR BLD AUTO: 34.3 % (ref 35–47)
HGB BLD-MCNC: 11.6 G/DL (ref 11.7–15.7)
IMM GRANULOCYTES # BLD: 0 10E9/L (ref 0–0.4)
IMM GRANULOCYTES NFR BLD: 0.5 %
LACTATE BLD-SCNC: 1.8 MMOL/L (ref 0.7–2)
LYMPHOCYTES # BLD AUTO: 1 10E9/L (ref 0.8–5.3)
LYMPHOCYTES NFR BLD AUTO: 15.5 %
MCH RBC QN AUTO: 33.5 PG (ref 26.5–33)
MCHC RBC AUTO-ENTMCNC: 33.8 G/DL (ref 31.5–36.5)
MCV RBC AUTO: 99 FL (ref 78–100)
MONOCYTES # BLD AUTO: 0.8 10E9/L (ref 0–1.3)
MONOCYTES NFR BLD AUTO: 12.3 %
NEUTROPHILS # BLD AUTO: 4.2 10E9/L (ref 1.6–8.3)
NEUTROPHILS NFR BLD AUTO: 67.5 %
NRBC # BLD AUTO: 0 10*3/UL
NRBC BLD AUTO-RTO: 0 /100
PLATELET # BLD AUTO: 274 10E9/L (ref 150–450)
POTASSIUM SERPL-SCNC: 4 MMOL/L (ref 3.4–5.3)
PROT SERPL-MCNC: 6.5 G/DL (ref 6.8–8.8)
RBC # BLD AUTO: 3.46 10E12/L (ref 3.8–5.2)
SODIUM SERPL-SCNC: 137 MMOL/L (ref 133–144)
WBC # BLD AUTO: 6.2 10E9/L (ref 4–11)

## 2019-11-25 PROCEDURE — 25000131 ZZH RX MED GY IP 250 OP 636 PS 637: Mod: GY | Performed by: FAMILY MEDICINE

## 2019-11-25 PROCEDURE — 99213 OFFICE O/P EST LOW 20 MIN: CPT | Performed by: INTERNAL MEDICINE

## 2019-11-25 PROCEDURE — 85025 COMPLETE CBC W/AUTO DIFF WBC: CPT | Performed by: FAMILY MEDICINE

## 2019-11-25 PROCEDURE — 25000128 H RX IP 250 OP 636: Performed by: FAMILY MEDICINE

## 2019-11-25 PROCEDURE — G0378 HOSPITAL OBSERVATION PER HR: HCPCS

## 2019-11-25 PROCEDURE — 83605 ASSAY OF LACTIC ACID: CPT | Performed by: STUDENT IN AN ORGANIZED HEALTH CARE EDUCATION/TRAINING PROGRAM

## 2019-11-25 PROCEDURE — 25000125 ZZHC RX 250: Performed by: STUDENT IN AN ORGANIZED HEALTH CARE EDUCATION/TRAINING PROGRAM

## 2019-11-25 PROCEDURE — 96374 THER/PROPH/DIAG INJ IV PUSH: CPT | Mod: 59

## 2019-11-25 PROCEDURE — 71046 X-RAY EXAM CHEST 2 VIEWS: CPT

## 2019-11-25 PROCEDURE — 80053 COMPREHEN METABOLIC PANEL: CPT | Performed by: FAMILY MEDICINE

## 2019-11-25 PROCEDURE — 71260 CT THORAX DX C+: CPT

## 2019-11-25 PROCEDURE — 25000128 H RX IP 250 OP 636: Performed by: STUDENT IN AN ORGANIZED HEALTH CARE EDUCATION/TRAINING PROGRAM

## 2019-11-25 PROCEDURE — 25000132 ZZH RX MED GY IP 250 OP 250 PS 637: Mod: GY | Performed by: STUDENT IN AN ORGANIZED HEALTH CARE EDUCATION/TRAINING PROGRAM

## 2019-11-25 PROCEDURE — 25000132 ZZH RX MED GY IP 250 OP 250 PS 637: Mod: GY | Performed by: FAMILY MEDICINE

## 2019-11-25 PROCEDURE — 36415 COLL VENOUS BLD VENIPUNCTURE: CPT | Performed by: FAMILY MEDICINE

## 2019-11-25 PROCEDURE — 74177 CT ABD & PELVIS W/CONTRAST: CPT

## 2019-11-25 PROCEDURE — 36415 COLL VENOUS BLD VENIPUNCTURE: CPT | Performed by: STUDENT IN AN ORGANIZED HEALTH CARE EDUCATION/TRAINING PROGRAM

## 2019-11-25 PROCEDURE — 25800030 ZZH RX IP 258 OP 636: Performed by: STUDENT IN AN ORGANIZED HEALTH CARE EDUCATION/TRAINING PROGRAM

## 2019-11-25 RX ORDER — IOPAMIDOL 755 MG/ML
93 INJECTION, SOLUTION INTRAVASCULAR ONCE
Status: COMPLETED | OUTPATIENT
Start: 2019-11-25 | End: 2019-11-25

## 2019-11-25 RX ORDER — HYDROMORPHONE HCL/0.9% NACL/PF 0.2MG/0.2
0.2 SYRINGE (ML) INTRAVENOUS EVERY 6 HOURS PRN
Status: DISCONTINUED | OUTPATIENT
Start: 2019-11-25 | End: 2019-11-26 | Stop reason: HOSPADM

## 2019-11-25 RX ADMIN — AZATHIOPRINE 50 MG: 50 TABLET ORAL at 09:16

## 2019-11-25 RX ADMIN — PANTOPRAZOLE SODIUM 40 MG: 40 TABLET, DELAYED RELEASE ORAL at 09:15

## 2019-11-25 RX ADMIN — IOPAMIDOL 93 ML: 755 INJECTION, SOLUTION INTRAVENOUS at 14:07

## 2019-11-25 RX ADMIN — ASCORBIC ACID TAB 250 MG 250 MG: 250 TAB at 09:16

## 2019-11-25 RX ADMIN — HYDRALAZINE HYDROCHLORIDE 20 MG: 10 TABLET, FILM COATED ORAL at 09:16

## 2019-11-25 RX ADMIN — Medication 1 G: at 09:16

## 2019-11-25 RX ADMIN — CARVEDILOL 3.12 MG: 3.12 TABLET, FILM COATED ORAL at 09:16

## 2019-11-25 RX ADMIN — SODIUM CHLORIDE 500 ML: 9 INJECTION, SOLUTION INTRAVENOUS at 11:47

## 2019-11-25 RX ADMIN — Medication 250 MG: at 09:15

## 2019-11-25 RX ADMIN — LEVOTHYROXINE SODIUM 75 MCG: 25 TABLET ORAL at 09:15

## 2019-11-25 RX ADMIN — Medication 1 CAPSULE: at 09:15

## 2019-11-25 RX ADMIN — LIDOCAINE HYDROCHLORIDE 30 ML: 20 SOLUTION ORAL; TOPICAL at 17:03

## 2019-11-25 RX ADMIN — ACETAMINOPHEN 650 MG: 325 TABLET, FILM COATED ORAL at 03:22

## 2019-11-25 RX ADMIN — APIXABAN 2.5 MG: 2.5 TABLET, FILM COATED ORAL at 09:15

## 2019-11-25 RX ADMIN — CARVEDILOL 3.12 MG: 3.12 TABLET, FILM COATED ORAL at 17:03

## 2019-11-25 RX ADMIN — POTASSIUM CHLORIDE 20 MEQ: 750 TABLET, EXTENDED RELEASE ORAL at 20:34

## 2019-11-25 RX ADMIN — TRIMETHOPRIM 50 MG: 100 TABLET ORAL at 09:15

## 2019-11-25 RX ADMIN — Medication 1 CAPSULE: at 20:34

## 2019-11-25 RX ADMIN — OYSTER SHELL CALCIUM WITH VITAMIN D 1 TABLET: 500; 200 TABLET, FILM COATED ORAL at 09:16

## 2019-11-25 RX ADMIN — APIXABAN 2.5 MG: 2.5 TABLET, FILM COATED ORAL at 20:34

## 2019-11-25 RX ADMIN — Medication 0.2 MG: at 20:48

## 2019-11-25 RX ADMIN — ASPIRIN 81 MG CHEWABLE TABLET 81 MG: 81 TABLET CHEWABLE at 09:15

## 2019-11-25 RX ADMIN — POTASSIUM CHLORIDE 20 MEQ: 750 TABLET, EXTENDED RELEASE ORAL at 09:16

## 2019-11-25 RX ADMIN — ACETAMINOPHEN 650 MG: 325 TABLET, FILM COATED ORAL at 11:52

## 2019-11-25 RX ADMIN — ISOSORBIDE MONONITRATE 60 MG: 30 TABLET, EXTENDED RELEASE ORAL at 09:16

## 2019-11-25 RX ADMIN — Medication 1 G: at 20:34

## 2019-11-25 NOTE — PLAN OF CARE
Observation goals PRIOR TO DISCHARGE       Comments: -diagnostic tests and consults completed and resulted.  Not met  -vital signs normal or at patient baseline . Yes  -tolerating oral intake to maintain hydration. Yes  Nurse to notify provider when observation goals have been met and patient is ready for discharge

## 2019-11-25 NOTE — PROGRESS NOTES
Social Work Services Progress Note    Hospital Day: 2  Date of Initial Social Work Evaluation:  Not completed   Collaborated with:  Pt, palliative care NP, Heather's team    Data:  SW following 88 year old female pt for family care conference.     Intervention:  SW received update from Palliative Care NP that pt and family would like to have a care conference to discuss goals of care. Palliative Care NP was going to discuss with son updates and dates for CC. Family would like care conference on 11/26 in the morning.  Met with pt. Introduced self and role on treatment team. Pt agreeable to care conference.   SW paged Heather's team to discuss time for care conference.     Care conference scheduled for 1:00 PM 11/26/19 with Palliative Care, Deborahs, and family.     Updated pt, pt appreciative.     Assessment:  Pt and family in agreement with care conference.    Plan:    Anticipated Disposition:  TBD    Barriers to d/c plan:  Medical stability, disposition plan     Follow Up:  SW available as needed.    JAQUELINE Scott, ALKASW  6D Adult Acute Care   Ph:[phone]  Pager [phone]  Unit [phone]

## 2019-11-25 NOTE — PROGRESS NOTES
Observation goals PRIOR TO DISCHARGE        Comments: -diagnostic tests and consults completed and resulted.  Not met.  -vital signs normal or at patient baseline.  Yes  -tolerating oral intake to maintain hydration.  Yes  Nurse to notify provider when observation goals have been met and patient is ready for discharge  .   Abdominal and arthritis pain.  Afebrile, VSS on RA.  Alert and oriented x4.  Denies nausea.  Up with assist of 1 and walker.

## 2019-11-25 NOTE — PLAN OF CARE
"/81 (BP Location: Right arm)   Pulse 77   Temp 97.7  F (36.5  C) (Oral)   Resp 16   Ht 1.6 m (5' 3\")   Wt 68.9 kg (152 lb)   LMP  (LMP Unknown)   SpO2 96%   BMI 26.93 kg/m      Pt is DNR. A&O x4. AVSS. SOB with activity. Placed on 2L NC. Told Heather's team. C/O mid epigastric pain. Gave prn tylenol. Refused dilaudid. Seen by Palliative, Cards need to see pt. Had CXR this morning and CT of chest/abdomen this afternoon. Up with SBA and a walker. Got bolus NS 500ml x1 for CT contrast dye. Voiding in toilet. No BM today.       Observation goals  -diagnostic tests and consults completed and resulted: no  -vital signs normal or at patient baseline: yes  -tolerating oral intake to maintain hydration:yes   "

## 2019-11-25 NOTE — PROGRESS NOTES
Observation goals PRIOR TO DISCHARGE        Comments: -diagnostic tests and consults completed and resulted.  Not met.  -vital signs normal or at patient baseline.  Yes  -tolerating oral intake to maintain hydration.  Yes  Nurse to notify provider when observation goals have been met and patient is ready for discharge  .   Admitted from ED for abdominal pain and shortness of breath.  Afebrile, VSS on RA.  Alert and oriented x4.  Denies pain or nausea.  Up with assist of 1 and walker.

## 2019-11-25 NOTE — SIGNIFICANT EVENT
SPIRITUAL HEALTH SERVICES  SPIRITUAL ASSESSMENT Progress Note  [facility] ([place]) 6D     REFERRAL SOURCE: Request upon admission    We shared a conversation where I provided a listening, reflective ear as she shared about her life, family  and illness. At her request we prayed, I anointed her and gave her a tiny speck of Communion (with nurses OK).     PATIENT ANOINTED by Father Christiano Aleksander 11/25/2019     PLAN: Spiritual Health Services remains available for patient's ongoing support.    Christiano Armijo M.Div.     Pager [phone]

## 2019-11-25 NOTE — PROGRESS NOTES
SPIRITUAL HEALTH SERVICES  [facility] ([place]) 6D  ON-CALL VISIT     REFERRAL SOURCE: I did visit this morning patient Linda per [facility]  referral request. I introduced my self as the on-call  and gave her all the info she need to know about the [facility].      Pt is Gnosticism and requested to get a  for a blessing service. I will inform to our   to come over full fill the pt request.       PLAN: The  will visit the pt during her stay in the unit she is in.     Panda Esqueda M.Div. ([place]), M.Th., D.Min., [facility]  Staff   Pager [phone]

## 2019-11-25 NOTE — PLAN OF CARE
Observation goals  -diagnostic tests and consults completed and resulted: no  -vital signs normal or at patient baseline: yes  -tolerating oral intake to maintain hydration:yes    (2) assistive person

## 2019-11-25 NOTE — PROGRESS NOTES
[facility] Progress Note - Labadie's Service       Main Plans for Today   -CT abdomen/pelvis/chest with contrast  -palliative care consult  -cards following  -care coordination meeting tomorrow at 1pm    Assessment & Plan   Linda is a 88 year old female admitted on 11/24/2019. She has a history of history of HFpEF, MR s/p mitral clip placement, paroxysmal a fib, tachy/lisa syndrome s/p pacemaker, RA with associated RA-ILD, CKD III, and hypothyroidism and is admitted for epigastric abdominal pain, fatigue, and chronic shortness of breath.     # Epigastric Abdominal Pain   # Hx of Diverticulitis   Patient has a recent history of diverticulitis and completed a course of oral antibiotics yesterday.  Now has increased epigastric abdominal pain, nausea, poor appetite.  Differential diagnosis includes ischemic colitis, ulcer or gastritis, complication from diverticulitis. Less likely pancreatitis or biliary causes with normal lipase, RUQ US, CMP and CBC. Pain would be an unusual location for complicated diverticulitis although could be referred.  Immune response also could be suppressed given her baseline immunosuppression with azathioprine. No blood in stool or hematemesis to suggest GI bleed. Passing gas, last BM yesterday-less likely bowel obstruction.    Will obtain CT with contrast to evaluate for ischemic colitis, as her epigastric abdomen is very tender to light palpation. Benefits of contrast outweigh the risks. Patient was informed that the contrast may worsen her kidney function and was agreeable to the CT. Will give 500cc bolus of fluids prior to CT.  -CT abdomen/pelvis/chest with contrast today  -continue protonix  -low dose IV dilaudid q4h prn pain  -trend CBC    # NEIL on CKD III-IV   Baseline creatinine is 1.4 with increased to 1.7 today.  Is on increased dose of Lasix at home up to 40 mg twice daily. Suspect this is more likely prerenal NEIL from  increased lasix and dehydration on top of her CKD. Less likely cardiorenal given no peripheral edema or lung findings to suggest heart failure exacerbation.  -Hold Lasix today  - restart lasix at 40mg daily tomorrow, then increaseing to 40mg in AM and 20mg in PM  -Trend creatinine  -Cautious use of fluids    # Seropositive RA w/ associated RA ILD   # chronic shortness of breath   Patient has seropositive RA with associated RA interstitial lung disease.  Recent CT scan showed no progression of disease. Hypoxic to 88%, started on 2L O2 . No increased sputum production, fever, or cough to suggest pneumonia or flare of the ILD. Chest xray showed improvement of interstitial lung disease from xray 9/19, no focal infiltrates or pleural effusions. SOB likely due to worsening heart failure. No crackles or edema to suggest fluid overload-appears euvolemic on exam. Now requiring 2L O2.  -Oxygen as needed  -awaiting results of chest CT today  -palliative care consult-care conference tomorrow   -Continue PTA trimethoprim   -Continue PTA azathioprine    # Pain Assessment:  Current Pain Score 11/25/2019   Patient currently in pain? yes   Pain score (0-10) -   Pain location -   Pain descriptors -   - Linda is experiencing pain due to epigastric abdominal pain. Pain management was discussed and the plan was created in a collaborative fashion.  Linda's response to the current recommendations: engaged  - Please see the plan for pain management as documented above      Diet: 1 Gram Sodium Diet  Fluids: PO  DVT Prophylaxis: apixaban  Code Status: DNR    Disposition Plan   Expected discharge: 2 - 3 days, recommended to prior living arrangement once adequate pain management/ tolerating PO medications and safe disposition plan/ TCU bed available. Dispo: Expected Discharge Date: 11/27/19        Entered: Osmin Boateng 11/25/2019, 3:13 PM   Information in the above section will display in the discharge planner report.      The  patient's care was discussed with the Attending Physician, Dr. Joseph.    Osmin Boateng  [facility]  Pager: [phone]  Please see sticky note for cross cover information    Interval History   Patient reports worsening of epigastric pain with nausea. No vomiting, diarrhea, dysuria, fevers. Feels like her SOB is worsened.    Physical Exam   Vital Signs: Temp: 97.7  F (36.5  C) Temp src: Oral BP: 128/81 Pulse: 77 Heart Rate: 80 Resp: 16 SpO2: 96 % O2 Device: Nasal cannula Oxygen Delivery: 2 LPM  Weight: 152 lbs 0 oz  General Appearance: She is not in acute distress. Non-toxic appearing. Friendly.  Respiratory: Lungs CTA bilaterally, no wheezes or crackles. No respiratory distress.  Cardiovascular: RRR, no murmurs.  GI: No abdominal distension. Normoactive bowel sounds. Very tender to light palpation of epigastric ab. No guarding or rebound.  Skin: No edema, rashes, or bruising. Euvolemic on exam.    Data   Recent Labs   Lab 11/25/19  0601 11/24/19  1150 11/24/19  1149 11/20/19  1037   WBC 6.2 6.8  --   --    HGB 11.6* 12.3  --   --    MCV 99 101*  --   --     311  --   --     133  --  137   POTASSIUM 4.0 5.1  --  4.6   CHLORIDE 104 101  --  104   CO2 23 27  --  27   BUN 35* 38*  --  31*   CR 1.59* 1.73*  --  1.54*   ANIONGAP 10 6  --  6   FATOUMATA 8.8 9.0  --  9.0   GLC 88 109*  --  107*   ALBUMIN 3.1* 3.4  --   --    PROTTOTAL 6.5* 7.7  --   --    BILITOTAL 0.5 0.8  --   --    ALKPHOS 41 44  --   --    ALT 17 22  --   --    AST 16 42  --   --    LIPASE  --  94  --   --    TROPONIN  --   --  0.03  --      No results found for this or any previous visit (from the past 24 hour(s)).  Medications       apixaban ANTICOAGULANT  2.5 mg Oral BID     aspirin  81 mg Oral Daily     azaTHIOprine  50 mg Oral Daily     calcium carbonate-vitamin D  1 tablet Oral Daily     carvedilol  3.125 mg Oral BID w/meals     fish oil-omega-3 fatty acids  1 g Oral BID     hydrALAZINE  20 mg  Oral TID     isosorbide mononitrate  60 mg Oral Daily     levothyroxine  75 mcg Oral Daily     multivitamin  with lutein  1 capsule Oral BID     pantoprazole  40 mg Oral QAM AC     potassium chloride ER  20 mEq Oral BID     saccharomyces boulardii  250 mg Oral Daily     trimethoprim  50 mg Oral Daily

## 2019-11-25 NOTE — PHARMACY-CONSULT NOTE
"The following home medications were NOT continued on inpatient admission per \"Discontinuation of nonessential home medications during hospitalization\" policy: cranberry tablets    If a therapeutic holiday is deemed inappropriate per the prescriber, please notify the pharmacist regarding the medication order.    The pharmacist is available to answer any questions and/or concerns the patient may have regarding discontinuation of non-essential medications.    Please ensure that these medications are restarted as needed upon discharge via the medication reconciliation discharge process and included on the discharge medication reconciliation report.    Thank you,  Jefry Lopez, PharmD, BCPS  "

## 2019-11-25 NOTE — CONSULTS
"[facility]  Palliative Care Consultation Note    Patient: Linda Spicer  Date of Admission:  11/24/2019    Requesting Clinician / Team: Family Medicine  Reason for consult: Pain management  Goals of care  Patient and family support    Recommendations/discussion:   - Pt seen and examined. Discussed present clinical status with son and daughter on the phone.   - I reviewed cardiology notes- from 5/2019 \"Our options are very limited.  I cannot identify a reasonable medication or procedure to improve her chances of maintaining sinus rhythm. There may be some benefit in increasing her rate adaptive pacing as her symptoms come with exertion.\"  Pt perception is that cardiology supports a more palliative approach to her sx.  -Palliative care supports family conference with interdisciplinary team to review current status, prognosis, and goals of care.  -OT focus on energy conservation techniques in setting of progressive dyspnea and functional decline.  -Patient seemed significantly dyspneic at rest today.  Patient indicates she has not been on supplemental O2 at home.  - Her abdominal findings are concerning for possible bowel ischemia. CT abdomen pending.   Note she was recently hospitalized with sigmoid diveriticulitis (11/14/19), dyspnea and heart failure (9/25/19); Heart failure - Makayla clip( 2/11/19); CHF ( 2/7/19) and 2x in January of this year. She notes ongoing difficulty with fatigue, worsening exertional tolerance and dyspnea at rest, much worse with exertion.  -CODE STATUS and advanced directives reviewed.  She presently is DNR.  Her notes indicate that she would be willing to be intubated.  I have significant concern that where she intubated and placed on a ventilator she would never regain the strength to come off the ventilator. This needs to be reviewed with patient and family.  -In terms of her air hunger and shortness of breath offer the idea of low-dose " opiates.  She is opposed to additional medications at this time but open to an as needed approach to her symptoms.  Discussed with primary team.  She will have 0.2 mg of intravenous Dilaudid available every 6 hours as needed for shortness of breath.    These recommendations have been discussed with pt and primary team.      Thank you for the opportunity to participate in the care of this patient and family. Our team: will continue to follow.     During regular M-F work hours -- if you are not sure who specifically to contact -- please contact us by sending a text page to our team consult pager at [phone].    After regular work hours and on weekends/holidays, you can call our answering service at [phone]. Also, who's on call for us is available in [employer].   Pedro Luis MOELLER NP ACHPN  Nurse Practitioner- Lead Advanced Practice Provider  [facility] Palliative Medicine Consult Service   [phone]  TT spent: 44 minutes of which 34 minutes were spent in direct face to face contact with patient/family. Greater than 50% of time spent counseling and/or coordinating care.   Assessments:  Linda Spicer is a 88 year old female with a history of HFpEF, MR s/p mitral clip placement, paroxysmal a fib, tachy/lisa syndrome s/p pacemaker, RA with associated RA-ILD, CKD III, and hypothyroidism and is admitted for abdominal pain and shortness of breath. She was recently admitted from 11/14 - 11/16 with sigmoid diverticulitis, confirmed on CT, and treated with IV Zosyn before switching to Augmentin for a 10 day course. Numerous hospitalizations this year (7) mostly with heart failure and shortness of breath symptoms. Pt seen today for support and goals of care as it relates to her end stage heart failure.  Reviewed notes from PC consultation with prior admission      Prognosis, Goals, & Planning:      Functional Status just prior to hospitalization: 2 (Ambulatory and capable of all selfcare but unable to  carry out any work activities; may need help with IADLs up and about > 50% of waking hours)      Prognosis, Goals, and/or Advance Care Planning were addressed today: Yes        Summary/Comments:       Patient's decision making preferences: shared with support from loved ones          Patient has decision-making capacity today for complex decisions: Yes            I have concerns about the patient/family's health literacy today: No           Patient has a completed Health Care Directive: Yes, and on file.      Code status: DNR    Coping, Meaning, & Spirituality:   Mood, coping, and/or meaning in the context of serious illness were addressed today: Yes  Summary/Comments:   Social:     Living situation: Linda continues to live in [facility].  She and her  moved in to this facility in 2015.  Her  was diagnosed with prostate cancer and  2018. She continues to grieve his loss.  She is supported by her friends, family and debbie community.      Key family / caregivers: Linda has 6 children, 10 grandchildren, and 19 great grandkids.  She feels extremely well supported by her entire family.     Occupational history: Linda is retired, loves to read and listen to books, plays bridge 4 times a week, goes to [employer] twice a week, and has a happy hour on .    Current in-home services: Lives in an assisted living facility and feels the staff takes good care of her     History of Present Illness:  History gathered today from: patient, family/loved ones, medical chart, medical team members.  Patient relates prior to this hospitalization approximately 3 days of increasing fatigue and epigastric pain associated with shortness of breath.  Discomfort seems similar to previous but more intense.  She is unable to speak in complete sentences and is barely able to tolerate normal activities such as preparing a meal.  She very much feels the weight of her chronic illnesses  limiting her abilities and its impact on her quality of life.  She denies any sense of fevers or chills.  Her cough is been baseline.  Has not had any difficulty with bowel or bladder.  She is concerned about transitioning to more of an assisted living environment as she feels that they will not be able to accommodate her sodium restricted diet (1000 mg/day).  Key Palliative Symptom Data:  # Pain severity the last 12 hours: moderate  # Dyspnea severity the last 12 hours: moderate  # Nausea severity the last 12 hours: low  # Anxiety severity the last 12 hours: low      ROS:  Comprehensive ROS is reviewed and is negative except as here & per HPI     Past Medical History:  Past Medical History:   Diagnosis Date     Adjustment disorder with anxious mood 5/18/2015     Advanced directives, counseling/discussion 8/30/2012    Patient states has Advance Directive and will bring in a copy to clinic. 8/30/2012   [facility] \       Anemia 9/25/2015     Basal cell cancer      CHF (congestive heart failure) (H) 9/18/2014     CKD (chronic kidney disease) stage 3, GFR 30-59 ml/min (H) 9/29/2015     DDD (degenerative disc disease), lumbar 9/25/2015     Diffuse idiopathic pulmonary fibrosis (H) 5/6/2013     Encounter for palliative care 5/18/2015     History of blood transfusion 9/29/2015     Hypertension goal BP (blood pressure) < 140/90 9/7/2012     Hypothyroid 9/7/2012     Irritable bowel syndrome 10/29/2013     Macular degeneration      Macular degeneration, 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     Nondisplaced spiral fracture of shaft of humerus      Osteoporosis 8/13/2013     Imo Update utility     RA (rheumatoid arthritis) (H) 5/7/2013     Rheumatic fever      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 2/20/2019     Shingles      Spinal stenosis of lumbar region with neurogenic claudication 9/14/2015        Past Surgical History:  Past Surgical History:   Procedure Laterality Date     ANESTHESIA CARDIOVERSION N/A  9/14/2018    Procedure: ANESTHESIA CARDIOVERSION;;  Surgeon: GENERIC ANESTHESIA PROVIDER;  Location: [facility] OR     ANESTHESIA CARDIOVERSION N/A 10/11/2018    Procedure: ANESTHESIA CARDIOVERSION;  Cardioversion;  Surgeon: GENERIC ANESTHESIA PROVIDER;  Location: [facility] OR     ANESTHESIA CARDIOVERSION N/A 10/16/2018    Procedure: Anesthesia Cardioversion ;  Surgeon: GENERIC ANESTHESIA PROVIDER;  [facility]     APPENDECTOMY       BIOPSY      hemorrhoidectomy     CV HEART CATHETERIZATION WITH POSSIBLE INTERVENTION N/A 1/31/2019    Procedure: CORS,LHC,RHC-YOLANDA BEFORE CATH APPOINTMENT;  Surgeon: Avila Watson MD;  Location:  HEART CARDIAC CATH LAB     CV MITRACLIP N/A 2/11/2019    Procedure: Mitraclip Procedure;  Surgeon: Avila Watson MD;  Location:  OR     ENT SURGERY      tonsillectomy     GYN SURGERY      3 D & C's     HYSTERECTOMY, PAP NO LONGER INDICATED       LAMINECTOMY LUMBAR ONE LEVEL N/A 10/13/2015    Procedure: LAMINECTOMY LUMBAR ONE LEVEL;  Surgeon: Fransico Toussaint MD;  Location:  OR         Family History:  Family History   Problem Relation Age of Onset     Hypertension Mother      Psychotic Disorder Father      Diabetes Son      Diabetes Daughter      Blood Disease Daughter          Allergies:  Allergies   Allergen Reactions     Cephalexin Diarrhea and GI Disturbance     Other reaction(s): GI Upset       Cephalexin Hcl Diarrhea     Gabapentin Other (See Comments)     Dizzsiness  Shaky, dizzy, dry mouth  Dizzsiness  Shaky,dry mouth       Methotrexate Other (See Comments)     Depleted Folic Acid  And caused severe leg cramps  Severe leg cramps     Naproxen GI Disturbance, Other (See Comments) and Nausea     Constipation. Tolerates ibuprofen.       Perfume      Sulfa Drugs Shortness Of Breath     Throat swelling     Alprazolam Other (See Comments)     Dizziness      Levofloxacin GI Disturbance     Macrobid [Nitrofurantoin Anhydrous]      Possibly related to lung disease      Nitrofurantoin       Possibly related to lung disease      Seasonal Allergies      Ciprofloxacin Itching and Rash        Medications:  I have reviewed this patient's medication profile and medications from this hospitalization.     Physical Exam:  Vital Signs: Temp: 97.9  F (36.6  C) Temp src: Oral BP: 134/83 Pulse: 77 Heart Rate: 83 Resp: 18 SpO2: 93 % O2 Device: None (Room air)    Weight: 152 lbs 0 oz   General appearance: Lying in hospital bed no acute distress.  She is dyspneic at rest speaking in phrases with a respiratory rate in the 40s.  HEENT: NC\AT, MMM, symmetric facial features.  EOMI.  Nonicteric sclera.  Neck supple.  JVD plus.  Lungs: Clear anteriorly with moderate respiratory effort.  Diminished breath sounds bilateral bases.  No wheeze.  Heart: S1-S2 with totally irregular rhythm consistent with atrial fibrillation.  Rate controlled in the 80s at rest.  No murmur.  Abdominal: No masses noted abdomen is soft diffusely tender especially in the epigastric quadrants.  No rebound or guarding.  Musculoskeletal: No evidence for swelling in visible joints.  No peripheral edema identified.  Peripheral pulses intact.  Not observed walking or transferring.  Integument: No rashes or open areas on exposed surfaces.  IV sites appear to be patent and intact.  Neurologic: No focal deficit identified.  No tremor.  Cranial nerves basically intact.  Psychiatric: Mood and affect appear normal.  She is distressed regarding her increasing symptom burden.  She would like to review her circumstances with her family and develop a different plan of care recognizing that she has more limitations that she had earlier in the year and not improving.     Data reviewed:  ROUTINE  LABS (Last four results)  BMP  Recent Labs   Lab 11/25/19  0601 11/24/19  1150 11/20/19  1037    133 137   POTASSIUM 4.0 5.1 4.6   CHLORIDE 104 101 104   FATOUMATA 8.8 9.0 9.0   CO2 23 27 27   BUN 35* 38* 31*   CR 1.59* 1.73* 1.54*   GLC 88 109* 107*     CBC  Recent Labs    Lab 11/25/19  0601 11/24/19  1150   WBC 6.2 6.8   RBC 3.46* 3.74*   HGB 11.6* 12.3   HCT 34.3* 37.6   MCV 99 101*   MCH 33.5* 32.9   MCHC 33.8 32.7   RDW 13.2 13.2    311     INRNo lab results found in last 7 days.

## 2019-11-25 NOTE — PLAN OF CARE
Observation goals PRIOR TO DISCHARGE       Comments: -diagnostic tests and consults completed and resulted.  Not met.  -vital signs normal or at patient baseline.  Yes  -tolerating oral intake to maintain hydration.  Yes  Nurse to notify provider when observation goals have been met and patient is ready for discharge  .   Admitted from ED for abdominal pain and shortness of breath.  Afebrile, VSS on RA.  Alert and oriented x4.  Denies pain or nausea.  Takes potassium pills with applesauce.  Up with assist of 1 and walker.

## 2019-11-25 NOTE — PLAN OF CARE
-diagnostic tests and consults completed and resulted: No  -vital signs normal or at patient baseline: Yes  -tolerating oral intake to maintain hydration: Yes

## 2019-11-25 NOTE — CONSULTS
Cardiology Inpatient Consultation  November 25, 2019    Reason for Consult:  A cardiology consult was requested by Dr. Prieto from the Family Medicine service to provide clinical guidance regarding evaluation of heart failure.    HPI:   Linda Spicer is a 88 year old female with HFpEF, MR s/p mitral clip placement, paroxysmal a fib on apixiban, tachy/lisa s/p PPM, RA with associated RA-ILD, CKD III (creat 1.4), hypothyroidism and diverticulitis recently admitted 11/14-11/16 tx with IV antibiotics who is now admitted for abdominal pain, fatigue and shortness of breath. Cardiology consulted for evaluation of heart failure.      Patient reports 3 days of fatigue, epigastric pain, nausea and decreased appetite. She states that her epigastric pain feels different than her prior diverticulitis pain. She denies associated diarrhea, vomiting, fever or chills. Patient reports chronic dyspnea related to her ILD. She was recently seen in [facility] 11/21 with increased shortness of breath and fatigue. She was noted to be hypervolemic and her lasix was increased to 40 mg BID. Her weight has since come down, breathing has improved and is near her baseline and leg swelling has resolved. She states that her weight initially decreased to her baseline of of 153 lbs but then she lost an additional 2-3 lbs over the last few days as she has not been eating or drinking due to abdominal pain.     On admission her labs notable for normal WBC, stable Hgb, NEIL with creatinine 1.7, BNP 3,011 which is up slightly when compared to prior. Her admission weight was 152 down from 158 lbs concerning for possible volume depletion. Abdominal pain concerning for recurrent diverticulitis though unable to perform contrast CT due to NEIL. Currently holding lasix with improvement in creatinine.     At the time of interview, the patient denies chest pain, orthopnea, PND, palpitations, lightheadedness, or syncope. She reports chronic exertional  dyspnea and fatigue. Continues to experience epigastric pain.     Review of Systems:    Complete review of systems was performed and negative except per HPI.    PMH:  Past Medical History:   Diagnosis Date     Adjustment disorder with anxious mood 5/18/2015     Advanced directives, counseling/discussion 8/30/2012    Patient states has Advance Directive and will bring in a copy to clinic. 8/30/2012   [facility] Medical Assistant \       Anemia 9/25/2015     Basal cell cancer      CHF (congestive heart failure) (H) 9/18/2014     CKD (chronic kidney disease) stage 3, GFR 30-59 ml/min (H) 9/29/2015     DDD (degenerative disc disease), lumbar 9/25/2015     Diffuse idiopathic pulmonary fibrosis (H) 5/6/2013     Encounter for palliative care 5/18/2015     History of blood transfusion 9/29/2015     Hypertension goal BP (blood pressure) < 140/90 9/7/2012     Hypothyroid 9/7/2012     Irritable bowel syndrome 10/29/2013     Macular degeneration      Macular degeneration, 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     Nondisplaced spiral fracture of shaft of humerus      Osteoporosis 8/13/2013     Imo Update utility     RA (rheumatoid arthritis) (H) 5/7/2013     Rheumatic fever      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 2/20/2019     Shingles      Spinal stenosis of lumbar region with neurogenic claudication 9/14/2015     Active Problems:  Patient Active Problem List    Diagnosis Date Noted     High risk medication use 11/03/2015     Priority: High     Age-related osteoporosis without current pathological fracture 11/03/2015     Priority: High     Seropositive rheumatoid arthritis (H) 05/07/2013     Priority: High     Abdominal pain 11/24/2019     Priority: Medium     Diverticulitis large intestine 11/14/2019     Priority: Medium     Acute respiratory distress 10/01/2019     Priority: Medium     Enterococcus UTI 09/25/2019     Priority: Medium     Renal insufficiency 09/25/2019     Priority: Medium     Urinary retention 09/25/2019      Priority: Medium     Swelling of right lower extremity 02/21/2019     Priority: Medium     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 02/20/2019     Priority: Medium     RUSSELL (dyspnea on exertion) 02/08/2019     Priority: Medium     Dyspnea 02/07/2019     Priority: Medium     Acute on chronic dyspnea with no specific etiology despite cardiac and pulmonary workup       Status post coronary angiogram 01/31/2019     Priority: Medium     Mitral valve insufficiency, unspecified etiology 01/22/2019     Priority: Medium     Added automatically from request for surgery [id number]       Abnormal findings on diagnostic imaging of cardiovascular system 01/22/2019     Priority: Medium     Added automatically from request for surgery [id number]       SOB (shortness of breath) 01/15/2019     Priority: Medium     Mitral regurgitation 12/16/2018     Priority: Medium     Pre-syncope 11/17/2018     Priority: Medium     CHF exacerbation (H) 10/08/2018     Priority: Medium     Persistent atrial fibrillation 09/18/2018     Priority: Medium     Atrial flutter (H) 07/31/2018     Priority: Medium     Heart failure with preserved ejection fraction, NYHA class I (H) 03/19/2018     Priority: Medium     ILD (interstitial lung disease) (H) 01/16/2018     Priority: Medium     Immunosuppression (H) 11/21/2017     Priority: Medium     Cardiac pacemaker - Medtronic dual lead pacemaker - Not Dependent - MRI Safe 07/05/2017     Priority: Medium     Sick sinus syndrome (H) 02/19/2017     Priority: Medium     Health Care Home 01/13/2017     Priority: Medium     Other specified hypothyroidism 07/22/2016     Priority: Medium     Congestive heart failure with preserved LV function, NYHA class 3 (H) 06/21/2016     Priority: Medium     Heart failure with preserved ejection fraction (H) 06/02/2016     Priority: Medium     Impingement syndrome of both shoulders 04/05/2016     Priority: Medium     UIP (usual interstitial pneumonitis) (H) 04/05/2016     Priority: Medium      High risk medications (not anticoagulants) long-term use 04/05/2016     Priority: Medium     Atrophic vaginitis 02/08/2016     Priority: Medium     Fecal incontinence 02/08/2016     Priority: Medium     Female stress incontinence 02/08/2016     Priority: Medium     Aftercare following surgery 10/28/2015     Priority: Medium     S/P lumbar laminectomy 10/28/2015     Priority: Medium     CKD (chronic kidney disease) stage 3, GFR 30-59 ml/min (H) 09/29/2015     Priority: Medium     History of blood transfusion 09/29/2015     Priority: Medium     Mild anemia 09/25/2015     Priority: Medium     DDD (degenerative disc disease), lumbar 09/25/2015     Priority: Medium     Encounter for palliative care 05/18/2015     Priority: Medium     Adjustment disorder with anxious mood 05/18/2015     Priority: Medium     Irritable bowel syndrome 10/29/2013     Priority: Medium     Macular degeneration, left eye 05/07/2013     Priority: Medium     Hypertension goal BP (blood pressure) < 150/90 09/07/2012     Priority: Medium     Hypothyroidism 09/07/2012     Priority: Medium     Rheumatoid arthritis involving multiple sites with positive rheumatoid factor (H) 08/19/2010     Priority: Medium     Social History:  Social History     Tobacco Use     Smoking status: Never Smoker     Smokeless tobacco: Never Used   Substance Use Topics     Alcohol use: Yes     Comment: rare wine      Drug use: No     Family History:  Family History   Problem Relation Age of Onset     Hypertension Mother      Psychotic Disorder Father      Diabetes Son      Diabetes Daughter      Blood Disease Daughter        Medications:    apixaban ANTICOAGULANT  2.5 mg Oral BID     aspirin  81 mg Oral Daily     azaTHIOprine  50 mg Oral Daily     calcium carbonate-vitamin D  1 tablet Oral Daily     carvedilol  3.125 mg Oral BID w/meals     fish oil-omega-3 fatty acids  1 g Oral BID     hydrALAZINE  20 mg Oral TID     isosorbide mononitrate  60 mg Oral Daily      levothyroxine  75 mcg Oral Daily     multivitamin  with lutein  1 capsule Oral BID     pantoprazole  40 mg Oral QAM AC     potassium chloride ER  20 mEq Oral BID     saccharomyces boulardii  250 mg Oral Daily     trimethoprim  50 mg Oral Daily     vitamin C  250 mg Oral Daily       Physical Exam:  Temp:  [97.6  F (36.4  C)-99.4  F (37.4  C)] 99  F (37.2  C)  Pulse:  [77-83] 77  Heart Rate:  [78-83] 82  Resp:  [18] 18  BP: (109-140)/(56-84) 127/73  SpO2:  [95 %-99 %] 96 %    Intake/Output Summary (Last 24 hours) at 11/25/2019 0743  Last data filed at 11/25/2019 0400  Gross per 24 hour   Intake 100 ml   Output --   Net 100 ml     GEN: pleasant, no acute distress  HEENT: no icterus  CV: RRR, normal s1/s2, no murmurs/rubs/s3/s4, no heave. JVP normal.  CHEST: fine bibasilar crackles, likely chronic r/t ILD - no rales concerning for HF  ABD: soft, non-tender, normal active bowel sounds  EXTR: pulses intact. No clubbing, cyanosis or edema.   NEURO: alert oriented, speech fluent/appropriate, motor grossly nonfocal    Diagnostics:  All labs and imaging were reviewed, of note:       Lab Results   Component Value Date     11/25/2019    Lab Results   Component Value Date    CHLORIDE 104 11/25/2019    Lab Results   Component Value Date    BUN 35 11/25/2019      Lab Results   Component Value Date    POTASSIUM 4.0 11/25/2019    Lab Results   Component Value Date    CO2 23 11/25/2019    Lab Results   Component Value Date    CR 1.59 11/25/2019        Lab Results   Component Value Date    WBC 6.2 11/25/2019    HGB 11.6 (L) 11/25/2019    HCT 34.3 (L) 11/25/2019    MCV 99 11/25/2019     11/25/2019     EKG 11/24/19:      Transthoracic echocardiogram:   Interpretation Summary  Global and regional left ventricular function is normal with an EF of 60-65%.  Right ventricular function, chamber size, wall motion, and thickness are  normal.  S/P Mitral clip.Mean mitral gradient 4.5mmHg at heartrate 70BPM. Trivial  residual  MR.  The inferior vena cava is normal.  No pericardial effusion is present.    Assessment and Recommendation:  Linda Spicer is a 88 year old female with HFpEF, MR s/p mitral clip placement, paroxysmal a fib on apixiban, tachy/lisa s/p PPM, RA with associated RA-ILD, CKD III (creat 1.4), and hypothyroidism and is admitted for abdominal pain, fatigue and shortness of breath, cardiology consulted for evaluation of heart failure.    HFpEF, EF 60-65%: Currently patient appears euvolemic on exam. Suspect NEIL was related to slight volume depletion related to poor PO intake prior to admission. Creatinine appears to be improving with holding lasix.  Agree with holding lasix again today then restarting 40 mg daily tomorrow morning, then increasing to 40 mg in the morning and 20 mg in the afternoon prior to discharge with [facility] follow-up within 1 week of discharge. In regards to her shortness of breath, patient states that this is chronic related to her ILD and only slightly worse than baseline. Unlikely that dyspnea is related to volume overload. Could consider chest CT to rule-out other possible causes of dyspnea including pneumonia, PE vs progression of ILD.     I have discussed the above with Dr. Simeon    Thank you for consulting the cardiovascular services at the [facility]. Please do not hesitate to call us with any questions.     SUNNI Murray, CNP  [facility] Cardiology Consult Team  Pager [phone] or [phone]

## 2019-11-26 ENCOUNTER — TELEPHONE (OUTPATIENT)
Dept: CARDIOLOGY | Facility: CLINIC | Age: 84
End: 2019-11-26

## 2019-11-26 VITALS
RESPIRATION RATE: 16 BRPM | BODY MASS INDEX: 26.93 KG/M2 | WEIGHT: 152 LBS | OXYGEN SATURATION: 96 % | HEART RATE: 81 BPM | TEMPERATURE: 98.9 F | DIASTOLIC BLOOD PRESSURE: 73 MMHG | SYSTOLIC BLOOD PRESSURE: 127 MMHG | HEIGHT: 63 IN

## 2019-11-26 LAB
ALBUMIN SERPL-MCNC: 2.9 G/DL (ref 3.4–5)
ALP SERPL-CCNC: 40 U/L (ref 40–150)
ALT SERPL W P-5'-P-CCNC: 16 U/L (ref 0–50)
ANION GAP SERPL CALCULATED.3IONS-SCNC: 9 MMOL/L (ref 3–14)
AST SERPL W P-5'-P-CCNC: 13 U/L (ref 0–45)
BASOPHILS # BLD AUTO: 0 10E9/L (ref 0–0.2)
BASOPHILS NFR BLD AUTO: 0.8 %
BILIRUB SERPL-MCNC: 0.4 MG/DL (ref 0.2–1.3)
BUN SERPL-MCNC: 32 MG/DL (ref 7–30)
CALCIUM SERPL-MCNC: 8.2 MG/DL (ref 8.5–10.1)
CHLORIDE SERPL-SCNC: 104 MMOL/L (ref 94–109)
CO2 SERPL-SCNC: 22 MMOL/L (ref 20–32)
CREAT SERPL-MCNC: 1.37 MG/DL (ref 0.52–1.04)
DIFFERENTIAL METHOD BLD: ABNORMAL
EOSINOPHIL # BLD AUTO: 0.3 10E9/L (ref 0–0.7)
EOSINOPHIL NFR BLD AUTO: 5.3 %
ERYTHROCYTE [DISTWIDTH] IN BLOOD BY AUTOMATED COUNT: 13.2 % (ref 10–15)
GFR SERPL CREATININE-BSD FRML MDRD: 34 ML/MIN/{1.73_M2}
GLUCOSE SERPL-MCNC: 87 MG/DL (ref 70–99)
HCT VFR BLD AUTO: 33.4 % (ref 35–47)
HGB BLD-MCNC: 11 G/DL (ref 11.7–15.7)
IMM GRANULOCYTES # BLD: 0 10E9/L (ref 0–0.4)
IMM GRANULOCYTES NFR BLD: 0.2 %
LYMPHOCYTES # BLD AUTO: 1.1 10E9/L (ref 0.8–5.3)
LYMPHOCYTES NFR BLD AUTO: 21.3 %
MCH RBC QN AUTO: 32.5 PG (ref 26.5–33)
MCHC RBC AUTO-ENTMCNC: 32.9 G/DL (ref 31.5–36.5)
MCV RBC AUTO: 99 FL (ref 78–100)
MONOCYTES # BLD AUTO: 0.7 10E9/L (ref 0–1.3)
MONOCYTES NFR BLD AUTO: 12.8 %
NEUTROPHILS # BLD AUTO: 3 10E9/L (ref 1.6–8.3)
NEUTROPHILS NFR BLD AUTO: 59.6 %
NRBC # BLD AUTO: 0 10*3/UL
NRBC BLD AUTO-RTO: 0 /100
PLATELET # BLD AUTO: 278 10E9/L (ref 150–450)
POTASSIUM SERPL-SCNC: 4.3 MMOL/L (ref 3.4–5.3)
PROT SERPL-MCNC: 6.2 G/DL (ref 6.8–8.8)
RBC # BLD AUTO: 3.38 10E12/L (ref 3.8–5.2)
SODIUM SERPL-SCNC: 135 MMOL/L (ref 133–144)
WBC # BLD AUTO: 5.1 10E9/L (ref 4–11)

## 2019-11-26 PROCEDURE — 80053 COMPREHEN METABOLIC PANEL: CPT | Performed by: FAMILY MEDICINE

## 2019-11-26 PROCEDURE — 85025 COMPLETE CBC W/AUTO DIFF WBC: CPT | Performed by: FAMILY MEDICINE

## 2019-11-26 PROCEDURE — 25000132 ZZH RX MED GY IP 250 OP 250 PS 637: Mod: GY | Performed by: FAMILY MEDICINE

## 2019-11-26 PROCEDURE — 99354 ZZC PROLONGED SERV,OFFICE,1ST HR: CPT | Performed by: NURSE PRACTITIONER

## 2019-11-26 PROCEDURE — 36415 COLL VENOUS BLD VENIPUNCTURE: CPT | Performed by: FAMILY MEDICINE

## 2019-11-26 PROCEDURE — 99215 OFFICE O/P EST HI 40 MIN: CPT | Performed by: NURSE PRACTITIONER

## 2019-11-26 PROCEDURE — 25000131 ZZH RX MED GY IP 250 OP 636 PS 637: Mod: GY | Performed by: FAMILY MEDICINE

## 2019-11-26 PROCEDURE — G0378 HOSPITAL OBSERVATION PER HR: HCPCS

## 2019-11-26 PROCEDURE — 96376 TX/PRO/DX INJ SAME DRUG ADON: CPT

## 2019-11-26 PROCEDURE — 25000132 ZZH RX MED GY IP 250 OP 250 PS 637: Mod: GY | Performed by: STUDENT IN AN ORGANIZED HEALTH CARE EDUCATION/TRAINING PROGRAM

## 2019-11-26 PROCEDURE — 25000128 H RX IP 250 OP 636: Performed by: STUDENT IN AN ORGANIZED HEALTH CARE EDUCATION/TRAINING PROGRAM

## 2019-11-26 RX ORDER — ACETAMINOPHEN 325 MG/1
650 TABLET ORAL EVERY 4 HOURS PRN
Qty: 30 TABLET | Refills: 0 | Status: SHIPPED | OUTPATIENT
Start: 2019-11-26

## 2019-11-26 RX ORDER — HYDROMORPHONE HYDROCHLORIDE 2 MG/1
1 TABLET ORAL EVERY 6 HOURS PRN
Qty: 6 TABLET | Refills: 0 | Status: SHIPPED | OUTPATIENT
Start: 2019-11-26 | End: 2019-11-29

## 2019-11-26 RX ORDER — PANTOPRAZOLE SODIUM 40 MG/1
40 TABLET, DELAYED RELEASE ORAL 2 TIMES DAILY
Qty: 14 TABLET | Refills: 0 | Status: SHIPPED | OUTPATIENT
Start: 2019-11-26

## 2019-11-26 RX ORDER — TRIMETHOPRIM 100 MG/1
50 TABLET ORAL DAILY
Qty: 7 TABLET | Refills: 0 | Status: SHIPPED | OUTPATIENT
Start: 2019-11-26 | End: 2020-01-16

## 2019-11-26 RX ORDER — FUROSEMIDE 20 MG
40 TABLET ORAL DAILY
Qty: 7 TABLET | Refills: 0 | Status: SHIPPED | OUTPATIENT
Start: 2019-11-26

## 2019-11-26 RX ORDER — FUROSEMIDE 40 MG
40 TABLET ORAL DAILY
Status: DISCONTINUED | OUTPATIENT
Start: 2019-11-26 | End: 2019-11-26 | Stop reason: HOSPADM

## 2019-11-26 RX ADMIN — HYDRALAZINE HYDROCHLORIDE 20 MG: 10 TABLET, FILM COATED ORAL at 08:40

## 2019-11-26 RX ADMIN — Medication 1 G: at 08:40

## 2019-11-26 RX ADMIN — Medication 250 MG: at 08:40

## 2019-11-26 RX ADMIN — APIXABAN 2.5 MG: 2.5 TABLET, FILM COATED ORAL at 08:40

## 2019-11-26 RX ADMIN — TRIMETHOPRIM 50 MG: 100 TABLET ORAL at 08:41

## 2019-11-26 RX ADMIN — Medication 1 CAPSULE: at 08:41

## 2019-11-26 RX ADMIN — LEVOTHYROXINE SODIUM 75 MCG: 25 TABLET ORAL at 08:39

## 2019-11-26 RX ADMIN — Medication 0.2 MG: at 02:36

## 2019-11-26 RX ADMIN — POTASSIUM CHLORIDE 20 MEQ: 750 TABLET, EXTENDED RELEASE ORAL at 08:41

## 2019-11-26 RX ADMIN — SENNOSIDES AND DOCUSATE SODIUM 1 TABLET: 8.6; 5 TABLET ORAL at 02:40

## 2019-11-26 RX ADMIN — ASPIRIN 81 MG CHEWABLE TABLET 81 MG: 81 TABLET CHEWABLE at 08:41

## 2019-11-26 RX ADMIN — HYDRALAZINE HYDROCHLORIDE 20 MG: 10 TABLET, FILM COATED ORAL at 14:58

## 2019-11-26 RX ADMIN — AZATHIOPRINE 50 MG: 50 TABLET ORAL at 08:40

## 2019-11-26 RX ADMIN — Medication 0.2 MG: at 11:46

## 2019-11-26 RX ADMIN — ISOSORBIDE MONONITRATE 60 MG: 30 TABLET, EXTENDED RELEASE ORAL at 08:39

## 2019-11-26 RX ADMIN — PANTOPRAZOLE SODIUM 40 MG: 40 TABLET, DELAYED RELEASE ORAL at 08:40

## 2019-11-26 RX ADMIN — OYSTER SHELL CALCIUM WITH VITAMIN D 1 TABLET: 500; 200 TABLET, FILM COATED ORAL at 08:41

## 2019-11-26 RX ADMIN — FUROSEMIDE 40 MG: 40 TABLET ORAL at 14:58

## 2019-11-26 RX ADMIN — CARVEDILOL 3.12 MG: 3.12 TABLET, FILM COATED ORAL at 08:40

## 2019-11-26 NOTE — DISCHARGE SUMMARY
Attestation:  This patient has been seen and evaluated by me, Nestor Lopez MD on 11/26/2019.  I saw and discussed the case with the primary resident and the care team. I agree with the findings and plan in this note. I have reviewed today's vital signs, medications, laboratory results and imaging results.     [facility] Discharge Summary- [facility]  Service    Date of Admission:  11/24/2019  Date of Service: 11/26/2019  Date of Discharge:  11/26/2019  Discharging Attending Provider: Dr. Lopez   Discharge Team: Yvette    Discharge Diagnoses   -epigastric pain    Follow-ups Needed After Discharge   -follow up with PCP for post-hospital follow up within 7 days  -follow up with cardiologist in [facility] within 7 days    Hospital Course   Linda is a 88 year old female admitted on 11/24/2019. She has a history of history of HFpEF, MR s/p mitral clip placement, paroxysmal a fib, tachy/lisa syndrome s/p pacemaker, RA with associated RA-ILD, CKD III, and hypothyroidism and is admitted for epigastric abdominal pain, fatigue, and chronic shortness of breath.     # Epigastric Abdominal Pain   # Hx of Diverticulitis   Patient has a recent history of diverticulitis and completed a course of oral antibiotics yesterday. Now has increased epigastric abdominal pain, worse after eating, nausea, poor appetite. Imaging and lab workup unremarkable. Likely cause of epigastric pain is gastric ulcer. With her heart failure, ILD and chronic SOB, I would not recommend EGD at this time as risks with sedation outweigh the benefits of a definitive diagnosis. Recommend symptom management with PPI. Will go over her meds and stop non-essential medications to ease her pill burden as well.  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 Home Lasix recently increased to 40 mg twice daily. Suspect prerenal NEIL likely from increased lasix and dehydration on top of her CKD. Less likely cardiorenal given no peripheral edema or lung findings to suggest heart failure exacerbation. Creatinine improved at discharge.   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  Recent CT scan showed no progression of disease. No increased sputum production, fever, or cough to suggest pneumonia or flare of the ILD. Chest xray showed improvement of interstitial lung disease from xray 9/19, no focal infiltrates or pleural effusions. No crackles or edema to suggest fluid overload-appears euvolemic on exam. Not requiring oxygen.  - started hydromorphone 1mg PO q6h prn air hunger  -continue trimethoprim  -Continue PTA azathioprine    #hospice  Care conference with patient, her three children, and palliative care today. Patient desires to go to hospice care. Family will be looking into multiple facilities to see what best fits her needs. Would recommend hospice physicians look over her medications to see what is medically necessary and aligns with her needs. I agree that patient is a good candidate for hospice care, as her current chronic conditions are already medically maximized and per palliative, has about 6 months life expectancy. Additionally she has had 7 hospitalizations this year, and is progressively declining after each hospital stay. She would benefit from hospice to improve her quality of life and assist with her needs.    # Discharge Pain Plan:   - During her hospitalization, Linda experienced pain due to air hunger, epigastric pain.  The pain plan for discharge was discussed with Linda and her children and the plan was created in a collaborative fashion.    - Opioids  prescribed on discharge: hydromorphone  - Duration of opioids after discharge: Per CDC opioid prescribing guidelines, a 3 day prescription of opioids was provided.  - Bowel regimen: senna (home med)    Consultations This Hospital Stay   CARDIOLOGY HEART FAILURE (HF) IP CONSULT  PALLIATIVE CARE ADULT IP CONSULT  CARDIOLOGY GENERAL ADULT IP CONSULT  GI LUMINAL ADULT IP CONSULT    Code Status   DNR       The patient was discussed with Dr. [facility]  [facility]   Pager:[phone]  ___________________________________________________________________  Review of Systems:  CONSTITUTIONAL: NEGATIVE for fever, chills, change in weight  INTEGUMENTARY/SKIN: NEGATIVE for worrisome rashes, moles or lesions  EYES: NEGATIVE for vision changes or irritation  ENT/MOUTH: NEGATIVE for ear, mouth and throat problems  RESP: NEGATIVE for significant cough; POSITIVE for chronic SOB  CV: NEGATIVE for chest pain, palpitations or peripheral edema  GI: NEGATIVE heartburn, or change in bowel habits POSITIVE for epigastric pain, nausea  : NEGATIVE for frequency, dysuria, or hematuria  MUSCULOSKELETAL: NEGATIVE for significant arthralgias or myalgia  NEURO: NEGATIVE for weakness, dizziness or paresthesias  ENDOCRINE: NEGATIVE for temperature intolerance, skin/hair changes  HEME/ALLERGY: NEGATIVE for bleeding problems  PSYCHIATRIC: NEGATIVE for changes in mood or affect    Physical Exam   Vital Signs: Temp: 98.9  F (37.2  C) Temp src: Oral BP: 127/73 Pulse: 81 Heart Rate: 81 Resp: 16 SpO2: 96 % O2 Device: None (Room air)    Weight: 152 lbs 0 oz    General Appearance:  She is not in acute distress. Non-toxic appearing. Friendly.  Respiratory: Lungs CTA bilaterally, no wheezes or crackles. No respiratory distress.  Cardiovascular: RRR, no murmurs.  GI: No abdominal distension. Normoactive bowel sounds. Moderately tender to light palpation of epigastric ab. No guarding or  rebound.  Skin: No edema, rashes, or bruising. Euvolemic on exam.    Significant Results and Procedures   Most Recent 3 CBC's:  Recent Labs   Lab Test 11/26/19  0557 11/25/19  0601 11/24/19  1150   WBC 5.1 6.2 6.8   HGB 11.0* 11.6* 12.3   MCV 99 99 101*    274 311     Most Recent 3 BMP's:  Recent Labs   Lab Test 11/26/19  0557 11/25/19  0601 11/24/19  1150    137 133   POTASSIUM 4.3 4.0 5.1   CHLORIDE 104 104 101   CO2 22 23 27   BUN 32* 35* 38*   CR 1.37* 1.59* 1.73*   ANIONGAP 9 10 6   FATOUMATA 8.2* 8.8 9.0   GLC 87 88 109*   ,   Results for orders placed or performed during the hospital encounter of 11/24/19   Abdomen US, limited (RUQ only)    Narrative    EXAMINATION: Limited Abdominal Ultrasound, 11/24/2019 1:27 PM     COMPARISON: None.    HISTORY: Evaluate for biliary tract disease    FINDINGS:   Fluid: No evidence of ascites or pleural effusions.    Liver: The liver demonstrates normal echotexture and measures 12.9 cm  in length. No focal hepatic mass. The main portal vein is patent with  anterograde flow towards the liver.    Gallbladder: There are multiple shadowing gallstones. No gallbladder  wall thickening or pericholecystic fluid. No sonographic Pizano's  sign.     Bile Ducts: Both the intra- and extrahepatic biliary system are of  normal caliber.  The common bile duct measures 6 mm in diameter.    Pancreas: Visualized portions of the head and body of the pancreas are  unremarkable.     Kidney: The right kidney measures 9.4 cm long. There is no  hydronephrosis or hydroureter, no shadowing renal calculi, cystic  lesion or mass.       Impression    IMPRESSION:   Cholelithiasis, without evidence of acute cholecystitis.    I have personally reviewed the examination and initial interpretation  and I agree with the findings.    PEYTON CHAUDHARI, DO   XR Chest 2 Views    Narrative    EXAM: XR CHEST 2 VW  11/25/2019 10:46 AM     HISTORY:  worsening shortness of breath, hx ILD and HF      COMPARISON:   11/13/2019 chest CT an 9/25/2019 chest x-ray    FINDINGS:   Upright, PA and lateral views of the chest. Left chest wall  implantable cardiac defibrillator and leads are unchanged in position.  Unchanged mitral clip. Trachea is midline. Cardiac silhouette is  normal. There is diffuse interstitial thickening and basilar fibrosis,  left greater than right. No definitive pleural effusion. No  pneumothorax. Skeleton is also patent. Visualized upper abdomen is  unremarkable.       Impression    IMPRESSION:   Redemonstration of diffuse fibrotic changes throughout the lung fields  with a slight basilar predominance are largely unchanged. No  superimposed focal pulmonary opacities.     I have personally reviewed the examination and initial interpretation  and I agree with the findings.    ENRRIQUE HOLCOMB MD   CT Chest/Abdomen/Pelvis w Contrast    Narrative    EXAMINATION: CT CHEST/ABDOMEN/PELVIS W CONTRAST, 11/25/2019 2:25 PM    TECHNIQUE:  Helical CT images from the lung apices through the  symphysis pubis were obtained with contrast.  Coronal and sagittal  reformatted images were generated at a workstation for further  assessment.    CONTRAST:  93 ml Isovue 370.    COMPARISON: CT 11/17/2018 and 11/30/2019    HISTORY: LUQ pain, recent hx of diverticulitis, rule out ischemic  bowel    ADDITIONAL HISTORY FROM THE EMR:88 year old female?with a history  of?hypertension,?HFpEF,?MR?status post?mitral clip  placement,?paroxysmal atrial fibrillation/flutter,?tachy/bradycardia  syndrome?status post?permanent pacemaker, seropositive rheumatoid  arthritis with associated RA-ILD,?stage III?CKD, and  hypothyroidism;?who presents to the Emergency Department?accompanied  by her spouse?for evaluation of abdominal pain,?shortness of breath,  fatigue, loss of appetite, and nausea.    FINDINGS:    CHEST: Left chest wall pacemaker with the tip of the pacemaker leads  appropriately positioned in the right ventricle and atrium. Surgical  clip is  present. Cardiomegaly without pericardial effusion. Small  thyroid gland. Peripheral and basilar dominant subpleural  reticulations and thickening of the interlobular septations.  Honeycombing in the basilar segments consistent with underlying  interstitial lung disease no alveolar opacity to suggest infection. 5  mm nodule in the right lower lobe. No pleural effusion or  pneumothorax.Central tracheobronchial tree is patent.  Normal thoracic  vasculature. No thoracic lymphadenopathy. No thyroid nodules.    ABDOMEN and PELVIS: No hepatomegaly. Normal liver contours and  parenchymal density. No suspicious liver lesions. Portal veins appear  patent. Intra-/extrahepatic biliary ducts are not dilated.  2 large gallbladder stones, largest is reaching up to 2.4 cm. No  evidence of cholecystitis.  No splenomegaly. No focal lesion in the spleen.   Fatty atrophy of the pancreas. No pancreatic ductal dilatation.  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 For  example there is 1.5 cm cystlike lesion in the superior portion of the  pancreatic body immediately anterior to splenic vein. Another 1.3 cm  similar lesion in the punctate body (series 3 image 304 and uncinate  process measuring 1.3 cm (series 3 image 346). Although previous  follow-up CT from 11/17/2018 was obtained without contrast,  retrospectively these lesions appear to be present about one year ago.  No adrenal nodules.   Regarding the kidneys, there is no hydronephrosis or obstructing renal  stones. No focal lesion in the kidneys. Hysterectomy. Bladder is  grossly normal, however the neck of the bladder is somewhat inferior  than expected, may represent cystocele. Residual vagina is also lower  than expected, may represent vaginal prolapsus. Nodular contour of  uncertain significance (series 3 image 595). Moderate volume of stool  in the rectum. Extensive colonic diverticulosis throughout the course  of descending colon sigmoid colon and  proximal rectum. Improved fat  stranding and heterogeneity in the right lateral aspect of the  proximal rectum since CT from 11/13/2019. This is consistent with  healing diverticulitis.    No evidence of new diverticulitis elsewhere. No small bowel  obstruction. No dilated bowel loops segment. No pneumatosis  intestinalis No retroperitoneal, mesenteric, or pelvic  lymphadenopathy.  Concentric atherosclerosis of the infrarenal abdominal aorta without  occlusion or aneurysm. Mild narrowing in the origin of both the celiac  trunk and SMA.     BONES and SOFT TISSUES: Grade 1 anterolisthesis and left pars defects  at L4-L5.    Multilevel degenerative changes. Osteoporosis of the spine. No acute  fracture. No worrisome lytic or sclerotic bone lesion.      Impression    IMPRESSION:   1. No finding to explain patient's left upper quadrant pain.  2. Usual interstitial pneumonia pattern consistent with of rheumatoid  arthritis related interstitial lung disease.  3. Cholelithiasis without cholecystitis.  4. Atrophic pancreas containing 3 well-defined cysts in the pancreas,  larger than 1 cm.   5. Diverticulosis. Healing diverticulitis in the proximal rectum. No  new diverticulitis.     I have personally reviewed the examination and initial interpretation  and I agree with the findings.    YANDY SPENCER MD     *Note: Due to a large number of results and/or encounters for the requested time period, some results have not been displayed. A complete set of results can be found in Results Review.       Pending Results   These results will be followed up by:  Unresulted Labs Ordered in the Past 30 Days of this Admission     No orders found from 10/25/2019 to 11/25/2019.             Primary Care Physician   Tre Alvarado Vocal    Discharge Disposition   Discharged to home (independent living facility)  Condition at discharge: Stable    Discharge Orders      Reason for your hospital stay    Epigastric pain.     Activity    Your  activity upon discharge: activity as tolerated     When to contact your care team    Call your primary doctor if you have any of the following:  increased shortness of breath, increased swelling or increased pain.     Discharge Instructions    Start taking protonix 40mg twice a day.  Take lasix 40mg daily, until you follow up with your PCP or cardiologist within 7 days.  Start hydromorphone 1mg every 6 hours as needed to help with breathing.  Stop taking zantac.  Follow up with PCP within 7 days for post-hospital follow up.  Avoid spicy and acidic foods, and coffee.  Stop taking trimethoprim at least 3 days before infusion.  Follow up with cardiologist within 7 days for recommendations on lasix dosing.     Follow Up and recommended labs and tests    Follow up with primary care provider, Tre Lockett, within 7 days for hospital follow- up.  The following labs/tests are recommended: BMP    Follow up with cardiologist within 7 days for recommendations on lasix dose.     DNR (Do Not Resuscitate)     Diet    Follow this diet upon discharge: Orders Placed This Encounter      1 Gram Sodium Diet     Discharge Medications   Current Discharge Medication List      START taking these medications    Details   acetaminophen (TYLENOL) 325 MG tablet Take 2 tablets (650 mg) by mouth every 4 hours as needed for mild pain  Qty: 30 tablet, Refills: 0    Associated Diagnoses: Epigastric pain      HYDROmorphone (DILAUDID) 2 MG tablet Take 0.5 tablets (1 mg) by mouth every 6 hours as needed for pain (air hunger)  Qty: 6 tablet, Refills: 0    Associated Diagnoses: Air hunger      pantoprazole (PROTONIX) 40 MG EC tablet Take 1 tablet (40 mg) by mouth 2 times daily  Qty: 14 tablet, Refills: 0    Associated Diagnoses: Epigastric pain         CONTINUE these medications which have CHANGED    Details   furosemide (LASIX) 20 MG tablet Take 2 tablets (40 mg) by mouth daily  Qty: 14 tablet, Refills: 0    Associated Diagnoses: Acute on  chronic diastolic heart failure (H)      trimethoprim (TRIMPEX) 100 MG tablet Take 0.5 tablets (50 mg) by mouth daily  Qty: 7 tablet, Refills: 0    Associated Diagnoses: Chronic UTI         CONTINUE these medications which have NOT CHANGED    Details   apixaban ANTICOAGULANT (ELIQUIS ANTICOAGULANT) 2.5 MG tablet Take 1 tablet (2.5 mg) by mouth 2 times daily  Qty: 180 tablet, Refills: 3    Associated Diagnoses: Persistent atrial fibrillation      aspirin 81 MG EC tablet Take 81 mg by mouth daily      azaTHIOprine (IMURAN) 50 MG tablet Take 1 tablet (50 mg) by mouth daily  Qty: 90 tablet, Refills: 2    Comments: This replaces all previous azathioprine prescriptions.  Associated Diagnoses: Rheumatoid arthritis involving multiple sites with positive rheumatoid factor (H)      carvedilol (COREG) 3.125 MG tablet Take 1 tablet (3.125 mg) by mouth 2 times daily (with meals)  Qty: 180 tablet, Refills: 1    Associated Diagnoses: Heart failure with preserved ejection fraction, NYHA class I (H); Benign essential hypertension      COMPOUNDED NON-CONTROLLED SUBSTANCE (CMPD RX) - PHARMACY TO MIX COMPOUNDED MEDICATION Estriol 1mg/gram. Place 1 gram vaginally daily for 2 weeks. Then vaginally twice weekly  Qty: 30 g, Refills: 6    Associated Diagnoses: Atrophic vaginitis      Cranberry 400 MG TABS Take 1 tablet by mouth daily      denosumab (PROLIA) 60 MG/ML SOSY injection Inject 60 mg Subcutaneous every 6 months      fish oil-omega-3 fatty acids 1000 MG capsule Take 1 g by mouth 2 times daily      hydrALAZINE (APRESOLINE) 10 MG tablet Take 2 tablets (20 mg) by mouth 3 times daily  Qty: 180 tablet, Refills: 3    Comments: Increase in dose  Associated Diagnoses: (HFpEF) heart failure with preserved ejection fraction (H); Mitral valve insufficiency, unspecified etiology      isosorbide mononitrate (IMDUR) 60 MG 24 hr tablet Take 1 tablet (60 mg) by mouth daily  Qty: 90 tablet, Refills: 1    Associated Diagnoses: Hypertension goal BP  (blood pressure) < 150/90      levothyroxine (SYNTHROID/LEVOTHROID) 75 MCG tablet TAKE 1 TABLET BY MOUTH EVERY DAY  Qty: 90 tablet, Refills: 0    Associated Diagnoses: Hypothyroidism, unspecified type      Multiple Vitamins-Minerals (PRESERVISION AREDS) CAPS Take 1 capsule by mouth 2 times daily  Qty: 30 capsule, Refills: 1    Associated Diagnoses: Mild anemia      nitroGLYcerin (NITROSTAT) 0.4 MG sublingual tablet Place 1 tablet (0.4 mg) under the tongue every 5 minutes as needed for chest pain  Qty: 25 tablet, Refills: 11    Associated Diagnoses: Acute chest pain      !! order for DME Dispense SP Walker-small  Qty: 1 each, Refills: 0    Associated Diagnoses: Pain of toe of right foot; Status post bunionectomy      !! order for DME Equipment being ordered: Dispense baffle, for use with nebulizer.  Qty: 1 each, Refills: 0    Associated Diagnoses: Pneumonia of left upper lobe due to Mycoplasma pneumoniae      !! order for DME Equipment being ordered: Nebulizer. Use with Albuterol.  Qty: 1 each, Refills: 0    Associated Diagnoses: Hypoxia      !! order for DME Equipment being ordered: Dispense face mask.  Mrs. Spicer is immunosuppressed due to rheumatoid arthritis.  Qty: 1 Box, Refills: 11    Associated Diagnoses: Rheumatoid arthritis involving left wrist with positive rheumatoid factor (H)      potassium chloride ER (K-DUR/KLOR-CON M) 20 MEQ CR tablet Take 2 tablets (40 mEq) by mouth daily  Qty: 180 tablet, Refills: 3    Associated Diagnoses: Acute on chronic diastolic heart failure (H)      saccharomyces boulardii (FLORASTOR) 250 MG capsule Take 250 mg by mouth daily      senna-docusate (SENOKOT-S/PERICOLACE) 8.6-50 MG tablet Take 1 tablet by mouth daily as needed for constipation  Qty: 100 tablet, Refills: 3    Associated Diagnoses: Irritable bowel syndrome, unspecified type       !! - Potential duplicate medications found. Please discuss with provider.      STOP taking these medications        amoxicillin-clavulanate (AUGMENTIN) 500-125 MG tablet Comments:   Reason for Stopping:         calcium carbonate-vitamin D (OSCAL W/D) 500-200 MG-UNIT tablet Comments:   Reason for Stopping:         ranitidine (ZANTAC) 150 MG tablet Comments:   Reason for Stopping:         vitamin C (ASCORBIC ACID) 250 MG tablet Comments:   Reason for Stopping:             Allergies   Allergies   Allergen Reactions     Cephalexin Diarrhea and GI Disturbance     Other reaction(s): GI Upset       Gabapentin Other (See Comments)     Dizzsiness  Shaky, dizzy, dry mouth  Dizzsiness  Shaky,dry mouth       Methotrexate Other (See Comments)     Depleted Folic Acid  And caused severe leg cramps  Severe leg cramps     Naproxen GI Disturbance, Other (See Comments) and Nausea     Constipation. Tolerates ibuprofen.       Perfume      Sulfa Drugs Shortness Of Breath     Throat swelling     Alprazolam Other (See Comments)     Dizziness      Levofloxacin GI Disturbance     Macrobid [Nitrofurantoin Anhydrous]      Possibly related to lung disease      Seasonal Allergies      Ciprofloxacin Itching and Rash

## 2019-11-26 NOTE — TELEPHONE ENCOUNTER
[facility] Call Center    Phone Message    May a detailed message be left on voicemail: yes    Reason for Call: Medication Question or concern regarding medication   Prescription Clarification  Name of Medication: HYDROmorphone (DILAUDID) 2 MG tablet  Prescribing Provider: Dr. Nestor Lopez   Pharmacy: [facility]/PHARMACY #[id number] [place], [place] - [address]   What on the order needs clarification? Pt was just discharged from the hospital today, and they gave her a 3 day refill of Hydromorphone to help her with her breathing, and told her to follow up with Karla to get it filled after that. The soonest available appointment with Karla isn't until Monday. Pt's son is wondering if pt could get a temporary refill to last her until her appointment on Monday.           Action Taken: Message routed to:  Other: [facility]

## 2019-11-26 NOTE — PROGRESS NOTES
Social Work: Assessment with Discharge Plan    Patient Name:  Linda Spicer  :  1931  Age:  88 year old  MRN:  [id number]  Risk/Complexity Score:  Filed Complexity Screen Score: 11  Completed assessment with:  Patient    Presenting Information   Reason for Referral:  Discharge plan  Date of Intake:  2019  Referral Source:  Chart Review  Decision Maker:  Patient   Alternate Decision Maker:  Per DEJAH, daughter Toya [phone]  Health Care Directive:  Copy in Chart  Living Situation:  Apartment - independent apartment at [facility] in [place]   Previous Functional Status:  Independent  Patient and family understanding of hospitalization:  Pt understanding of hospitalization, displays good insight into disease progression.   Cultural/Language/Spiritual Considerations:  Pt is a 88 year old  [employer] female.   Adjustment to Illness:  Pt adjusting well to illness, pt considering hospice care, however acknowledges this is hard for her children.     Physical Health  Reason for Admission:    1. Abdominal pain, epigastric      Services Needed/Recommended:  Hospice/ Palliative Care    Mental Health/Chemical Dependency  Diagnosis:  Per chart review, pt has dx of adjustment disorder.  Support/Services in Place:  None  Services Needed/Recommended:  None at this time.     Support System  Significant relationship at present time:  Pt has 6 children, 3 live in the area.   Family of origin is available for support:  Pt's 3 children in the [place] area provide support for pt.   Other support available:  Friends at facility.   Gaps in support system:  Pt has large support system.  Patient is caregiver to:  None     Provider Information   Primary Care Physician:  Tre Lockett   [phone]   Clinic:  [facility] / [place] [address]      :  None    Financial   Income Source:  Retired, social security   Financial Concerns:  No concerns noted.   Insurance:    Payor/Plan  Subscriber Name Rel Member # Group #   MEDICARE - MEDICARE [place] WALTER  [id number]       ATTN CLAIMS, [address]   BCBS - BCBS OF [place]  [id number]      [address] [address]       Discharge Plan   Patient and family discharge goal:  Pt would like to be hospice, in her home.   Provided education on discharge plan:  YES  Patient agreeable to discharge plan:  YES  A list of Medicare Certified Facilities was provided to the patient and/or family to encourage patient choice. Patient's choices for facility are:  NA - at this moment.   Will NH provide Skilled rehabilitation or complex medical:  NA  General information regarding anticipated insurance coverage and possible out of pocket cost was discussed. Patient and patient's family are aware patient may incur the cost of transportation to the facility, pending insurance payment: NA  Barriers to discharge:  Medical stability    Discharge Recommendations   Anticipated Disposition:  Home with services  Transportation Needs:  Family:  Pt reports her family will transport home.   Name of Transportation Company and Phone:  NA    Additional comments   Met with pt to complete assessment and introduce self. Care conference is scheduled for today.   Pt reports she lives at the independent living at [facility]. Pt has someone who cleans her house every other week. Pt independent with ADL. Pt does cook for herself/her kids may cook for her. Pt does her own finances.  Pt reports she his having a difficult time with ADL and cooking due to shortness of breath, pt believes she needs additional assistance.   Pt stated she has looked into assisted living, however they are not able to accommodate her special diet.   Pt has been to U in the past, unsure of names.    Discussed with pt hobbies and things she enjoys. Pt reports she plays [employer] 2x/week and cards/bridge with her friends at facility. Pt is also working on genealogy for her family.   Will discuss  goals of care/next steps with family today.     JAQUELINE Scott, LGSW  [facility]   Ph:[phone]  Pager [phone]  Unit [phone]

## 2019-11-26 NOTE — CONSULTS
Hospice Consult 11/26/19    Writer met with patient's children in conference room (Xi, Leonardo and Arjun) and Najma and Misti were on speaker phone. Patient requested to stay in her room and rest. Writer explained hospice philosophy, benefit and services covered under Medicare. Pt family wondering if she could still continue her monthly orencia infusions for RA, along with imuran and prolia. Writer will follow up with Hospice Medical Director regarding these medications and coverage on hospice. Writer will then follow up with daughter Xi via phone. Family also discussed patient's code status of DNR only and that she may seek further outpatient workup for new upper GI pain. Writer explained all aggressive treatment and testing would need to be completed prior to hospice admission. Patient's family verbalized understanding. They were given hospice bridge sheet/business card with contact info. Care coordinated with LAURA Rossi.    Thank you for this consult,  [facility] RN  Cell: [phone]

## 2019-11-26 NOTE — PLAN OF CARE
"Observation goals to be met before discharge home:  -diagnostic tests and consults completed and resulted: UNMET- CC in AM  -vital signs normal or at patient baseline: MET  -tolerating oral intake to maintain hydration: MET       /71 (BP Location: Right arm)   Pulse 75   Temp 98  F (36.7  C) (Oral)   Resp 16   Ht 1.6 m (5' 3\")   Wt 68.9 kg (152 lb)   LMP  (LMP Unknown)   SpO2 95%   BMI 26.93 kg/m      "

## 2019-11-26 NOTE — PROGRESS NOTES
[facility] Progress Note - Heather's Service       Main Plans for Today   -palliative care following  -care coordination meeting   -GI marques    Assessment & Plan   Linda is a 88 year old female admitted on 11/24/2019. She has a history of history of HFpEF, MR s/p mitral clip placement, paroxysmal a fib, tachy/lisa syndrome s/p pacemaker, RA with associated RA-ILD, CKD III, and hypothyroidism and is admitted for epigastric abdominal pain, fatigue, and chronic shortness of breath.     # Epigastric Abdominal Pain   # Hx of Diverticulitis   Patient has a recent history of diverticulitis and completed a course of oral antibiotics yesterday. Now has increased epigastric abdominal pain, nausea, poor appetite.  Less likely pancreatitis or biliary causes with normal lipase, RUQ US, CMP and CBC. No blood in stool or hematemesis to suggest GI bleed. Passing gas, last BM yesterday-less likely bowel obstruction. CT ab/pelvis with contrast unremarkable.    Patient reports epigastric pain, worse after eating and taking pills. Denies NSAID use, alcohol, or smoking history. Likely cause of epigastric pain is gastric ulcer.Patient states she wants hospice care, however also wants to know the cause of her pain. Will discuss with GI for recs on further workup and management. With her heart failure, ILD and chronic SOB, I would not recommend EGD at this time as risks with sedation outweigh the benefits of a definitive diagnosis. Would recommend symptom management with PPI. Will go over her meds and stop non-essential medications to ease her pill burden as well.  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 Home Lasix recently increased to 40 mg twice daily. Suspectprerenal NEIL likely from  increased lasix and dehydration on top of her CKD. Less likely cardiorenal given no peripheral edema or lung findings to suggest heart failure exacerbation.  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  Recent CT scan showed no progression of disease. Hypoxic to 88%, started on 2L O2 . No increased sputum production, fever, or cough to suggest pneumonia or flare of the ILD. Chest xray showed improvement of interstitial lung disease from xray 9/19, no focal infiltrates or pleural effusions. SOB likely due to worsening heart failure. No crackles or edema to suggest fluid overload-appears euvolemic on exam. Not requiring oxygen.  -hydromorphone 0.2mg IV q6h prn air hunger  -continue trimethoprim  -Continue PTA azathioprine    #hospice  Care conference with patient, her three kids, and palliative care today. Patient desires to go to hospice care. Family will be looking into multiple facilities to see what best fits her needs. Would recommend hospice physicians look over her medications to see what is medically necessary and aligns with her needs. I agree that patient is a good candidate for hospice care, as her current chronic conditions are already medically maximized and per palliative, has about 6 months life expectancy. Additionally she has had 7 hospitalizations this year, and is progressively declining after each hospital stay. She would benefit from hospice to improve her quality of life and assist with her needs.    # Pain Assessment:  Current Pain Score 11/26/2019   Patient currently in pain? denies   Pain score (0-10) -   Pain location -   Pain descriptors -   - Linda is experiencing pain due to epigastric abdominal pain. Pain management was discussed and the plan was created in a  collaborative fashion.  Linda's response to the current recommendations: engaged  - Please see the plan for pain management as documented above    Diet: 1 Gram Sodium Diet  Fluids: PO  DVT Prophylaxis: apixaban  Code Status: DNR    Disposition Plan   Expected discharge: Tomorrow, recommended to prior living arrangement once adequate pain management/ tolerating PO medications and safe disposition plan/ TCU bed available. Dispo: Expected Discharge Date: 11/26/19(Pending care conference)        Entered: Osmin Boateng 11/26/2019, 2:23 PM   Information in the above section will display in the discharge planner report.      The patient's care was discussed with the Attending Physician, Dr. Lopez.    Osmin Boateng  [facility]  Pager: [phone]  Please see sticky note for cross cover information    Interval History   Patient reports no improvement to her epigastric pain with nausea. No vomiting, diarrhea, dysuria, fevers. Ab pain is worse after taking medications. SOB is improved with the hydromorphone.    Physical Exam   Vital Signs: Temp: 98.9  F (37.2  C) Temp src: Oral BP: 127/73 Pulse: 81 Heart Rate: 81 Resp: 16 SpO2: 96 % O2 Device: None (Room air)    Weight: 152 lbs 0 oz  General Appearance: She is not in acute distress. Non-toxic appearing. Friendly.  Respiratory: Lungs CTA bilaterally, no wheezes or crackles. No respiratory distress.  Cardiovascular: RRR, no murmurs.  GI: No abdominal distension. Normoactive bowel sounds. Moderately tender to light palpation of epigastric ab. No guarding or rebound.  Skin: No edema, rashes, or bruising. Euvolemic on exam.    Data   Recent Labs   Lab 11/26/19  0557 11/25/19  0601 11/24/19  1150  11/24/19  1149   WBC 5.1 6.2 6.8  --   --    HGB 11.0* 11.6* 12.3  --   --    MCV 99 99 101*  --   --     274 311  --   --     137 133  --   --    POTASSIUM 4.3 4.0 5.1  --   --    CHLORIDE 104 104 101  --   --    CO2 22 23 27   --   --    BUN 32* 35* 38*  --   --    CR 1.37* 1.59* 1.73*  --   --    ANIONGAP 9 10 6  --   --    FATOUMATA 8.2* 8.8 9.0  --   --    GLC 87 88 109*  --   --    ALBUMIN 2.9* 3.1* 3.4   < >  --    PROTTOTAL 6.2* 6.5* 7.7   < >  --    BILITOTAL 0.4 0.5 0.8   < >  --    ALKPHOS 40 41 44   < >  --    ALT 16 17 22   < >  --    AST 13 16 42   < >  --    LIPASE  --   --  94  --   --    TROPONIN  --   --   --   --  0.03    < > = values in this interval not displayed.     Recent Results (from the past 24 hour(s))   CT Chest/Abdomen/Pelvis w Contrast    Narrative    EXAMINATION: CT CHEST/ABDOMEN/PELVIS W CONTRAST, 11/25/2019 2:25 PM    TECHNIQUE:  Helical CT images from the lung apices through the  symphysis pubis were obtained with contrast.  Coronal and sagittal  reformatted images were generated at a workstation for further  assessment.    CONTRAST:  93 ml Isovue 370.    COMPARISON: CT 11/17/2018 and 11/30/2019    HISTORY: LUQ pain, recent hx of diverticulitis, rule out ischemic  bowel    ADDITIONAL HISTORY FROM THE EMR:88 year old female?with a history  of?hypertension,?HFpEF,?MR?status post?mitral clip  placement,?paroxysmal atrial fibrillation/flutter,?tachy/bradycardia  syndrome?status post?permanent pacemaker, seropositive rheumatoid  arthritis with associated RA-ILD,?stage III?CKD, and  hypothyroidism;?who presents to the Emergency Department?accompanied  by her spouse?for evaluation of abdominal pain,?shortness of breath,  fatigue, loss of appetite, and nausea.    FINDINGS:    CHEST: Left chest wall pacemaker with the tip of the pacemaker leads  appropriately positioned in the right ventricle and atrium. Surgical  clip is present. Cardiomegaly without pericardial effusion. Small  thyroid gland. Peripheral and basilar dominant subpleural  reticulations and thickening of the interlobular septations.  Honeycombing in the basilar segments consistent with underlying  interstitial lung disease no alveolar opacity to suggest  infection. 5  mm nodule in the right lower lobe. No pleural effusion or  pneumothorax.Central tracheobronchial tree is patent.  Normal thoracic  vasculature. No thoracic lymphadenopathy. No thyroid nodules.    ABDOMEN and PELVIS: No hepatomegaly. Normal liver contours and  parenchymal density. No suspicious liver lesions. Portal veins appear  patent. Intra-/extrahepatic biliary ducts are not dilated.  2 large gallbladder stones, largest is reaching up to 2.4 cm. No  evidence of cholecystitis.  No splenomegaly. No focal lesion in the spleen.   Fatty atrophy of the pancreas. No pancreatic ductal dilatation.  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 For  example there is 1.5 cm cystlike lesion in the superior portion of the  pancreatic body immediately anterior to splenic vein. Another 1.3 cm  similar lesion in the punctate body (series 3 image 304 and uncinate  process measuring 1.3 cm (series 3 image 346). Although previous  follow-up CT from 11/17/2018 was obtained without contrast,  retrospectively these lesions appear to be present about one year ago.  No adrenal nodules.   Regarding the kidneys, there is no hydronephrosis or obstructing renal  stones. No focal lesion in the kidneys. Hysterectomy. Bladder is  grossly normal, however the neck of the bladder is somewhat inferior  than expected, may represent cystocele. Residual vagina is also lower  than expected, may represent vaginal prolapsus. Nodular contour of  uncertain significance (series 3 image 595). Moderate volume of stool  in the rectum. Extensive colonic diverticulosis throughout the course  of descending colon sigmoid colon and proximal rectum. Improved fat  stranding and heterogeneity in the right lateral aspect of the  proximal rectum since CT from 11/13/2019. This is consistent with  healing diverticulitis.    No evidence of new diverticulitis elsewhere. No small bowel  obstruction. No dilated bowel loops segment. No  pneumatosis  intestinalis No retroperitoneal, mesenteric, or pelvic  lymphadenopathy.  Concentric atherosclerosis of the infrarenal abdominal aorta without  occlusion or aneurysm. Mild narrowing in the origin of both the celiac  trunk and SMA.     BONES and SOFT TISSUES: Grade 1 anterolisthesis and left pars defects  at L4-L5.    Multilevel degenerative changes. Osteoporosis of the spine. No acute  fracture. No worrisome lytic or sclerotic bone lesion.      Impression    IMPRESSION:   1. No finding to explain patient's left upper quadrant pain.  2. Usual interstitial pneumonia pattern consistent with of rheumatoid  arthritis related interstitial lung disease.  3. Cholelithiasis without cholecystitis.  4. Atrophic pancreas containing 3 well-defined cysts in the pancreas,  larger than 1 cm.   5. Diverticulosis. Healing diverticulitis in the proximal rectum. No  new diverticulitis.     I have personally reviewed the examination and initial interpretation  and I agree with the findings.    YANDY SPENCER MD     Medications       apixaban ANTICOAGULANT  2.5 mg Oral BID     aspirin  81 mg Oral Daily     azaTHIOprine  50 mg Oral Daily     calcium carbonate-vitamin D  1 tablet Oral Daily     carvedilol  3.125 mg Oral BID w/meals     fish oil-omega-3 fatty acids  1 g Oral BID     hydrALAZINE  20 mg Oral TID     isosorbide mononitrate  60 mg Oral Daily     levothyroxine  75 mcg Oral Daily     multivitamin  with lutein  1 capsule Oral BID     pantoprazole  40 mg Oral QAM AC     potassium chloride ER  20 mEq Oral BID     saccharomyces boulardii  250 mg Oral Daily     trimethoprim  50 mg Oral Daily

## 2019-11-26 NOTE — PLAN OF CARE
Observation goals:    -diagnostic tests and consults completed and resulted: No  -vital signs normal or at patient baseline: Yes  -tolerating oral intake to maintain hydration: Yes

## 2019-11-26 NOTE — PROVIDER NOTIFICATION
Heather's text paged re: Pt inquiring about a medication order for arthritic pain. PRN Tylenol does not help. Please advise.

## 2019-11-26 NOTE — PROGRESS NOTES
[facility]  Palliative Care Daily Progress Note       Recommendations/discussion & Counseling     -Palliative care supports family conference with interdisciplinary team to review current status, prognosis, and goals of care scheduled for today  -OT focus on energy conservation techniques in setting of progressive dyspnea and functional decline.  -Patient seemed significantly dyspneic at rest today.  Patient indicates she has not been on supplemental O2 at home.  - Her abdominal findings were concerning for possible bowel ischemia. CT abdomen negative in this regard.  -Continue with recently started low-dose Dilaudid.  May change to as needed rather than scheduled dosing.  Overall respiratory effort appears improved  Note she was recently hospitalized with sigmoid diveriticulitis- seen as resolving with recent CT- (11/14/19), dyspnea and heart failure (9/25/19); Heart failure - Makayla clip( 2/11/19); CHF ( 2/7/19) and 2x in January of this year. She notes ongoing difficulty with fatigue, worsening exertional tolerance and dyspnea at rest, much worse with exertion.  - Hospice evaluation with or without HHA personnel from another agency  -CODE STATUS and advanced directives reviewed.  She presently is DNR.  Her notes indicate that she would be willing to be intubated.  I have significant concern that where she intubated and placed on a ventilator she would never regain the strength to come off the ventilator. This needs to be reviewed with patient and family.      Assessments          Linda Spicer is a 88 year old female with a history of HFpEF, MR s/p mitral clip placement, paroxysmal a fib, tachy/lisa syndrome s/p pacemaker, RA with associated RA-ILD, CKD III, and hypothyroidism and is admitted for abdominal pain and shortness of breath. She was recently admitted from 11/14 - 11/16 with sigmoid diverticulitis, confirmed on CT, and treated with IV Zosyn before  switching to Augmentin for a 10 day course. Numerous hospitalizations this year (7) mostly with heart failure and shortness of breath symptoms. Pt seen again today for support and goals of care as it relates to her end stage heart failure.  Palliative care attended interdisciplinary team/family conference to review goals as outlined below.      Prognosis, Goals, or Advance Care Planning was addressed today with: Yes.  Mood, coping, and/or meaning in the context of serious illness were addressed today: Yes.  Summary/Comments:   Pedro Luis MOELLER NP  Nurse Practitioner- Lead Advanced Practice Provider  [facility] Palliative Medicine Consult Service   [phone]  TT spent: 85 minutes of which 75 minutes were spent in direct face to face contact with patient/family.  Tenets and principles of palliative care in comparison with hospice care were outlined today.  Greater than 50% of time spent counseling and/or coordinating care.  60 minutes was spent with care conference, 15 minutes spent in meeting with patient earlier in the day and examination at bedside.  10 minutes spent in care coordination with team and family.         Interval History:     Patient relates prior to this hospitalization approximately 3 days of increasing fatigue and epigastric pain associated with shortness of breath. Minimal improvement since admission. Discomfort seems similar to previous but more intense.  She is unable to speak in complete sentences and is barely able to tolerate normal activities such as preparing a meal.  She very much feels the weight of her chronic illnesses limiting her abilities and its impact on her quality of life.  She denies any sense of fevers or chills.  Her cough has been baseline.  Has not had any difficulty with bowel or bladder.  She is concerned about transitioning to more of an assisted living environment as she feels that they will not be able to accommodate her sodium restricted diet (1000 mg/day).  Care  conference 11/26/2019.  In: 1300 out: 1400  Staff members present Dr. Boateng family medicine resident, palliative care nurse practitioner, palliative care , unit  and fourth-year medical student.  Family members present: Patient, 2 adult sons one adult daughter present and participating for entirety of conference.  Dr. Boateng outlined hospital course and present clinical condition.  Reviewed this being her seventh hospitalization this year mostly for heart failure exacerbation.  Imaging from November 25, 2019 reviewed.  Patient's current quality of life decline in the setting of increasing symptom burden was discussed.  Options for supportive care at home such as traditional home care versus palliative care versus hospice care were reviewed.  Patient stated on at least 3 occasions that she was interested in hospice care going forward.  Family asked appropriate questions regarding the nuances of hospice enrollment and plan of care going forward.  Information we have provided the patient's family regarding hospice agencies available.   Key Palliative Symptoms:  # Dyspnea severity the last 12 hours: moderate  # Nausea severity the last 12 hours: low  # Anxiety severity the last 12 hours: low    Patient is on opioids: assessed and bowels ok/no needed changes to plan of care today.           Review of Systems:     Patient continues to report epigastric pain with nausea. Again with no vomiting, diarrhea, dysuria, fevers. Feels like her SOB continues to be worse than baseline. See HPI.          Medications:     I have reviewed this patient's medication profile and medications during this hospitalization           Physical Exam:   Vitals were reviewed  Temp: 97.8  F (36.6  C) Temp src: Oral BP: 114/67 Pulse: 81 Heart Rate: 62 Resp: 16 SpO2: 96 % O2 Device: None (Room air) Oxygen Delivery: 2 LPM  General appearance: Sitting upright in wheelchair no acute distress she is mildly dyspneic at rest with  respiratory rate in the 30s.  No cough.  HEENT: EOMI.  Nonicteric sclera.  Neck supple.  Symmetric facial features.  Lungs: Clear bilaterally with diminished inspiratory effort and diminished bilateral breath sounds at bases.  No wheeze.  Heart: S1-S2, totally irregular rhythm consistent with atrial fibrillation.  Rate in the 80s.  Abdomen: No masses noted abdomen is soft diffusely tender in the epigastrium no rebound or guarding.  Bowel sounds present.  Musculoskeletal: No evidence for swelling of visible joints.  No peripheral edema.  Was observed transferring from bed to chair without difficulty.  Minimal assist of one  Neuropsychiatric: Pleasant alert and communicative.  Mildly anxious.  Good fund of knowledge and good historian..           Data Reviewed:     ROUTINE LABS (Last four results)  BMP  Recent Labs   Lab 11/26/19  0557 11/25/19  0601 11/24/19  1150 11/20/19  1037    137 133 137   POTASSIUM 4.3 4.0 5.1 4.6   CHLORIDE 104 104 101 104   FATOUMATA 8.2* 8.8 9.0 9.0   CO2 22 23 27 27   BUN 32* 35* 38* 31*   CR 1.37* 1.59* 1.73* 1.54*   GLC 87 88 109* 107*     CBC  Recent Labs   Lab 11/26/19  0557 11/25/19  0601 11/24/19  1150   WBC 5.1 6.2 6.8   RBC 3.38* 3.46* 3.74*   HGB 11.0* 11.6* 12.3   HCT 33.4* 34.3* 37.6   MCV 99 99 101*   MCH 32.5 33.5* 32.9   MCHC 32.9 33.8 32.7   RDW 13.2 13.2 13.2    274 311     INRNo lab results found in last 7 days.

## 2019-11-26 NOTE — PROGRESS NOTES
All goals met for discharge. Discharge instructions given to patient and all questions answered. Patient able to verbalize understanding of instructions. PIV discontinued. Family here to provide ride home. All belongings [facility]

## 2019-11-26 NOTE — PLAN OF CARE
-diagnostic tests and consults completed and resulted: No  -vital signs normal or at patient baseline: Yes  -tolerating oral intake to maintain hydration: Yes    Pt reporting arthritic pain of R upper leg. Aqua pump placed with relief. Declined PRN tylenol. Call light within reach, able to make needs known. Will continue to monitor and follow POC. Plan for care conference tomorrow at 1300.

## 2019-11-26 NOTE — PROGRESS NOTES
"[facility]  Palliative Care Initial Spiritual Assessment    Patient: Linda Spicer  Date of Admission:  11/24/2019  Reason for consult: goals of care and patient/family coping      Summary and Recommendations:  Patient Linda Spicer is considering plan of care in light of her diagnosis and prognosis.     Her relationships with children and her Taoist debbie are central to her coping; she is at peace with the idea of dying and comfortable discussing goals with her children.    I will follow for spiritual support while Palliative Care is consulted.    Sravani Mckeon  Palliative   Pager [phone]  [facility] Inpatient Team Consult pager [phone] (M-F 8-4:30)  After-hours Answering Service [phone]      Assessments:   Care conference with patient Linda Spicer, sons Leonardo and Arjun, and daughter Xi.    Distress:  Distress in the context of serious illness was assessed today:    Existential/spiritual/emotional distress: Yes; Linda described herself as \"very tired\". She worries about her own functional status and requested more information about hospice, which she believes may align with her own wishes for plan of care. Children's primary concern is that Linda continue to maintain quality of life, if she is able.      Restorationist distress:  No.      Social/economic/relational distress:  Yes; Linda worries that she may not be able to function at her previous baseline, but she does not wish to leave the independent living apartment where she currently resides.    Coping, Meaning, & Spirituality:   Coping, meaning, and/or spirituality in the context of serious illness were assessed today:    Spiritual background and preferences: Taoist      Beliefs, rituals, and practices: prayer, Taoist sacraments - Linda was grateful she had received anointing on Monday.      Meaning-making: through debbie and life experience - Linda said she was at peace with dying, " knowing heaven and family who have  before her wait for her.      Strengths and resources: family, debbie, friends, life experience    Prognosis, Goals, & Planning:     Prognosis, Goals, and/or Advance Care Planning were assessed today: Yes - Linda would like more information regarding hospice.      Preferred language: English      Patient's decision making preferences: shared with support from loved ones      I have concerns about the patient/family's health literacy today: No      Patient has a completed Health Care Directive: Yes, and on file.      Code status per chart review: DNR    Key Palliative Symptom Data:  # Dyspnea severity the last 12 hours: moderate  # Nausea severity the last 12 hours: low  # Anxiety severity the last 12 hours: low      Interventions:  I offered spiritual and emotional support through reflective listening that affirmed emotions and experience and assistance in goals of care conversation.

## 2019-11-27 ENCOUNTER — PATIENT OUTREACH (OUTPATIENT)
Dept: CARE COORDINATION | Facility: CLINIC | Age: 84
End: 2019-11-27

## 2019-11-27 ENCOUNTER — TELEPHONE (OUTPATIENT)
Dept: FAMILY MEDICINE | Facility: CLINIC | Age: 84
End: 2019-11-27

## 2019-11-27 DIAGNOSIS — Z71.89 OTHER SPECIFIED COUNSELING: ICD-10-CM

## 2019-11-27 NOTE — PROGRESS NOTES
Clinic Care Coordination Contact    Situation: Patient chart reviewed by care coordinator.    Background: Patient was on inpatient discharge report.    Assessment: Patient was discharged home on hospice.    Plan/Recommendations: RN CC will not outreach to patient.    Melissa Behl BSN, RN, PHN, CCM  Primary Care Clinical RN Care Coordinator  [facility]   [phone]

## 2019-11-27 NOTE — TELEPHONE ENCOUNTER
Patient needs hospital follow up. Please schedule patient for this.       Carmenza Miller RN, BSN, PHN

## 2019-11-27 NOTE — TELEPHONE ENCOUNTER
Reason for Call:  Other call back    Detailed comments: PT's son states that pt was just released from the hospital and that she was prescribed Dilaudid while there. They only sent her home with a 3 days supply and said that she would need to see provider for a longer prescription. They would like a call back to advise if they need to make an appt to come see provider or not. Thank you.    Phone Number Patient can be reached at: Cell number on file:    Telephone Information:   Mobile [phone]       Best Time: Any    Can we leave a detailed message on this number? YES    Call taken on 11/27/2019 at 11:25 AM by Katie Nunez

## 2019-11-27 NOTE — TELEPHONE ENCOUNTER
Called, spoke with Arjun, patient is scheduled for this Friday with Dr. Lockett.  Elmo Jimenez,  For 1st Floor Primary Care ([facility])

## 2019-11-27 NOTE — TELEPHONE ENCOUNTER
Called patients son, Arjun, regarding request for refill of dilaudid prescribed at hospital discharge.  Explained [facility] is not able to prescribe narcotics and will need to contact PCP to see if they will refill the medication.  Arjun verbalized understanding and will call PCP.  Arjun agreed to have Linda still see Karla on Monday as scheduled.  Pamela Aguilar RN

## 2019-11-29 ENCOUNTER — TRANSFERRED RECORDS (OUTPATIENT)
Dept: HEALTH INFORMATION MANAGEMENT | Facility: CLINIC | Age: 84
End: 2019-11-29

## 2019-11-29 ENCOUNTER — ALLIED HEALTH/NURSE VISIT (OUTPATIENT)
Dept: PHARMACY | Facility: CLINIC | Age: 84
End: 2019-11-29
Payer: COMMERCIAL

## 2019-11-29 ENCOUNTER — OFFICE VISIT (OUTPATIENT)
Dept: FAMILY MEDICINE | Facility: CLINIC | Age: 84
End: 2019-11-29
Payer: MEDICARE

## 2019-11-29 VITALS
BODY MASS INDEX: 26.93 KG/M2 | TEMPERATURE: 98.1 F | HEIGHT: 63 IN | SYSTOLIC BLOOD PRESSURE: 106 MMHG | RESPIRATION RATE: 18 BRPM | OXYGEN SATURATION: 99 % | HEART RATE: 83 BPM | DIASTOLIC BLOOD PRESSURE: 61 MMHG

## 2019-11-29 DIAGNOSIS — K29.00 OTHER ACUTE GASTRITIS WITHOUT HEMORRHAGE: Primary | ICD-10-CM

## 2019-11-29 DIAGNOSIS — M81.0 AGE-RELATED OSTEOPOROSIS WITHOUT CURRENT PATHOLOGICAL FRACTURE: ICD-10-CM

## 2019-11-29 DIAGNOSIS — I50.30 CONGESTIVE HEART FAILURE WITH PRESERVED LV FUNCTION, NYHA CLASS 3 (H): ICD-10-CM

## 2019-11-29 DIAGNOSIS — I48.0 PAROXYSMAL ATRIAL FIBRILLATION (H): ICD-10-CM

## 2019-11-29 DIAGNOSIS — E87.6 HYPOKALEMIA: ICD-10-CM

## 2019-11-29 DIAGNOSIS — H35.30 MACULAR DEGENERATION (SENILE) OF RETINA: ICD-10-CM

## 2019-11-29 DIAGNOSIS — J84.9 CHRONIC INTERSTITIAL LUNG DISEASE (H): ICD-10-CM

## 2019-11-29 DIAGNOSIS — Z78.9 TAKES DIETARY SUPPLEMENTS: ICD-10-CM

## 2019-11-29 DIAGNOSIS — M05.9 SEROPOSITIVE RHEUMATOID ARTHRITIS (H): ICD-10-CM

## 2019-11-29 DIAGNOSIS — I50.33 ACUTE ON CHRONIC HEART FAILURE WITH PRESERVED EJECTION FRACTION (H): ICD-10-CM

## 2019-11-29 DIAGNOSIS — R06.02 SOB (SHORTNESS OF BREATH): Primary | ICD-10-CM

## 2019-11-29 DIAGNOSIS — N39.0 RECURRENT UTI: ICD-10-CM

## 2019-11-29 DIAGNOSIS — E03.8 OTHER SPECIFIED HYPOTHYROIDISM: ICD-10-CM

## 2019-11-29 DIAGNOSIS — I10 HYPERTENSION GOAL BP (BLOOD PRESSURE) < 150/90: ICD-10-CM

## 2019-11-29 PROCEDURE — 99606 MTMS BY PHARM EST 15 MIN: CPT | Performed by: PHARMACIST

## 2019-11-29 PROCEDURE — 99607 MTMS BY PHARM ADDL 15 MIN: CPT | Performed by: PHARMACIST

## 2019-11-29 PROCEDURE — 99214 OFFICE O/P EST MOD 30 MIN: CPT | Performed by: INTERNAL MEDICINE

## 2019-11-29 RX ORDER — HYDROMORPHONE HYDROCHLORIDE 2 MG/1
1 TABLET ORAL EVERY 6 HOURS PRN
Qty: 12 TABLET | Refills: 0 | Status: SHIPPED | OUTPATIENT
Start: 2019-11-29 | End: 2019-12-24

## 2019-11-29 RX ORDER — POTASSIUM CHLORIDE 20MEQ/15ML
20 LIQUID (ML) ORAL 2 TIMES DAILY
Qty: 900 ML | Refills: 5 | Status: SHIPPED | OUTPATIENT
Start: 2019-11-29 | End: 2019-12-09

## 2019-11-29 ASSESSMENT — PAIN SCALES - GENERAL: PAINLEVEL: NO PAIN (0)

## 2019-11-29 NOTE — PATIENT INSTRUCTIONS
Recommendations from today's MTM visit:                                                      1. If you continue with the Prolia injections, I would recommend restarting the calcium and vitamin D.    2. Fish oil and eye vitamin are likely not necessary, but you can discuss with with cardiology and eye doctor as well.    It was great to speak with you today.  I value your experience and would be very thankful for your time with providing feedback on our clinic survey. You may receive a survey via email or text message in the next few days.     To schedule another MTM appointment, please call the clinic directly or you may call the MTM scheduling line at [phone] or toll-free at [phone].     My Clinical Pharmacist's contact information:                                                      It was a pleasure talking with you today!  Please feel free to contact me with any questions or concerns you have.      Vanessa Mitchell, PharmD  Medication Therapy Management Pharmacist  [phone]

## 2019-11-29 NOTE — PROGRESS NOTES
Subjective     Linda Spicer is a 88 year old female who presents to clinic today for the following health issues:    HPI       Hospital Follow-up Visit:    Hospital/Nursing Home/IP Rehab Facility: [facility]  Date of Admission: 11/24/2019  Date of Discharge: 11/26/219  Reason(s) for Admission: epigastric pain            Problems taking medications regularly:  None       Medication changes since discharge: Hydromorphone and Pantoprazole        Problems adhering to non-medication therapy:  None    Summary of hospitalization:  [facility] discharge summary reviewed  Diagnostic Tests/Treatments reviewed.  Follow up needed: Yes.  Other Healthcare Providers Involved in Patient s Care:         Care Coordination  Update since discharge: stable.     Post Discharge Medication Reconciliation: discharge medications reconciled, continue medications without change.  Plan of care communicated with patient and family     Coding guidelines for this visit:  Type of Medical   Decision Making Face-to-Face Visit       within 7 Days of discharge Face-to-Face Visit        within 14 days of discharge   Moderate Complexity [id number]   High Complexity [id number]            Heart Failure Follow-up  Are you experiencing any shortness of breath? Yes, with rest and activity How would you describe your shortness of breath?  Much worse    Are you experiencing any swelling in your legs or feet?  Worse than usual    Are you using more pillows than usual? Yes    Do you cough at night?  Yes    Do you check your weight daily?  Yes    Have you had a weight change recently?  No    Are you having any of the following side effects from your medications? (Select all that apply)  Dizziness and Fatigue    Since your last visit, how many times have you gone to the cardiologist, urgent care, emergency room, or hospital because of your heart failure?   2 times      Patient Active Problem List   Diagnosis     Hypertension goal BP  (blood pressure) < 150/90     Hypothyroidism     Seropositive rheumatoid arthritis (H)     Macular degeneration, left eye     Irritable bowel syndrome     Encounter for palliative care     Adjustment disorder with anxious mood     Mild anemia     DDD (degenerative disc disease), lumbar     CKD (chronic kidney disease) stage 3, GFR 30-59 ml/min (H)     History of blood transfusion     Aftercare following surgery     S/P lumbar laminectomy     High risk medication use     Age-related osteoporosis without current pathological fracture     Atrophic vaginitis     Fecal incontinence     Female stress incontinence     Impingement syndrome of both shoulders     UIP (usual interstitial pneumonitis) (H)     High risk medications (not anticoagulants) long-term use     Heart failure with preserved ejection fraction (H)     Congestive heart failure with preserved LV function, NYHA class 3 (H)     Other specified hypothyroidism     Rheumatoid arthritis involving multiple sites with positive rheumatoid factor (H)     Health Care Home     Sick sinus syndrome (H)     Cardiac pacemaker - Medtronic dual lead pacemaker - Not Dependent - MRI Safe     Immunosuppression (H)     Chronic interstitial lung disease (H)     Heart failure with preserved ejection fraction, NYHA class I (H)     Atrial flutter (H)     Persistent atrial fibrillation     CHF exacerbation (H)     Pre-syncope     Mitral regurgitation     SOB (shortness of breath)     Mitral valve insufficiency, unspecified etiology     Abnormal findings on diagnostic imaging of cardiovascular system     Status post coronary angiogram     Dyspnea     RUSSELL (dyspnea on exertion)     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     Swelling of right lower extremity     Enterococcus UTI     Renal insufficiency     Urinary retention     Acute respiratory distress     Diverticulitis large intestine     Abdominal pain     Other acute gastritis without hemorrhage     Past Surgical History:    Procedure Laterality Date     ANESTHESIA CARDIOVERSION N/A 9/14/2018    Procedure: ANESTHESIA CARDIOVERSION;;  Surgeon: GENERIC ANESTHESIA PROVIDER;  Location: [facility] OR     ANESTHESIA CARDIOVERSION N/A 10/11/2018    Procedure: ANESTHESIA CARDIOVERSION;  Cardioversion;  Surgeon: GENERIC ANESTHESIA PROVIDER;  [facility]     ANESTHESIA CARDIOVERSION N/A 10/16/2018    Procedure: Anesthesia Cardioversion ;  Surgeon: GENERIC ANESTHESIA PROVIDER;  [facility]     APPENDECTOMY       BIOPSY      hemorrhoidectomy     CV HEART CATHETERIZATION WITH POSSIBLE INTERVENTION N/A 1/31/2019    Procedure: CORS,LHC,RHC-YOLANDA BEFORE CATH APPOINTMENT;  Surgeon: Avila Watson MD;  Location:  HEART CARDIAC CATH LAB     CV MITRACLIP N/A 2/11/2019    Procedure: Mitraclip Procedure;  Surgeon: Avila Watson MD;  Location:  OR     ENT SURGERY      tonsillectomy     GYN SURGERY      3 D & C's     HYSTERECTOMY, PAP NO LONGER INDICATED       LAMINECTOMY LUMBAR ONE LEVEL N/A 10/13/2015    Procedure: LAMINECTOMY LUMBAR ONE LEVEL;  Surgeon: Fransico Toussaint MD;  Location:  OR       Social History     Tobacco Use     Smoking status: Never Smoker     Smokeless tobacco: Never Used   Substance Use Topics     Alcohol use: Yes     Comment: rare wine      Family History   Problem Relation Age of Onset     Hypertension Mother      Psychotic Disorder Father      Diabetes Son      Diabetes Daughter      Blood Disease Daughter          Allergies   Allergen Reactions     Cephalexin Diarrhea and GI Disturbance     Other reaction(s): GI Upset       Gabapentin Other (See Comments)     Dizzsiness  Shaky, dizzy, dry mouth  Dizzsiness  Shaky,dry mouth       Methotrexate Other (See Comments)     Depleted Folic Acid  And caused severe leg cramps  Severe leg cramps     Naproxen GI Disturbance, Other (See Comments) and Nausea     Constipation. Tolerates ibuprofen.       Perfume      Sulfa Drugs Shortness Of Breath     Throat swelling      Alprazolam Other (See Comments)     Dizziness      Levofloxacin GI Disturbance     Macrobid [Nitrofurantoin Anhydrous]      Possibly related to lung disease      Seasonal Allergies      Ciprofloxacin Itching and Rash     Recent Labs   Lab Test 11/26/19  0557 11/25/19  0601 11/24/19  1150  11/13/19  1257  09/26/19  0334  09/05/19  1007  08/09/18  0958  08/30/17  1214   LDL  --   --   --   --   --   --   --   --  120*  --  87  --  76   HDL  --   --   --   --   --   --   --   --  52  --  59  --  50   TRIG  --   --   --   --   --   --   --   --  120  --  51  --  101   ALT 16 17 22   < > 20  --   --    < >  --    < >  --    < >  --    CR 1.37* 1.59* 1.73*   < > 1.92*   < > 1.20*   < > 1.58*   < > 1.38*   < >  --    GFRESTIMATED 34* 29* 26*   < > 23*   < > 40*   < > 29*   < > 36*   < >  --    GFRESTBLACK 40* 33* 30*   < > 26*   < > 47*   < > 33*   < > 44*   < >  --    POTASSIUM 4.3 4.0 5.1   < > 3.8   < > 4.0   < > 4.7   < > 5.5*   < >  --    TSH  --   --   --   --  0.38*  --  1.13  --   --    < >  --    < > 0.77    < > = values in this interval not displayed.      BP Readings from Last 3 Encounters:   11/29/19 106/61   11/26/19 127/73   11/21/19 102/65    Wt Readings from Last 3 Encounters:   11/24/19 68.9 kg (152 lb)   11/21/19 71.7 kg (158 lb)   11/15/19 71 kg (156 lb 8 oz)                      Reviewed and updated as needed this visit by Provider         Review of Systems   ROS COMP: CONSTITUTIONAL:POSITIVE  for fatigue and malaise.  INTEGUMENTARY/SKIN: NEGATIVE for worrisome rashes, moles or lesions  EYES: NEGATIVE for vision changes or irritation  ENT/MOUTH: NEGATIVE for ear, mouth and throat problems  RESP:POSITIVE for SOB/dyspnea, leading to air hunger and NEGATIVE for hemoptysis, pleurisy and wheezing  CV: POSITIVE for chest pain/chest pressure (intermittent), orthopnea and paroxysmal nocturnal dyspnea.and claudication and NEGATIVE for cyanosis  GI: POSITIVE for abdominal pain epigastric and NEGATIVE for  "heartburn or reflux, hematemesis, hematochezia, jaundice and melena  MUSCULOSKELETAL: NEGATIVE for significant arthralgias or myalgia  NEURO: NEGATIVE for weakness, dizziness or paresthesias  ENDOCRINE: NEGATIVE for temperature intolerance, skin/hair changes  HEME: NEGATIVE for bleeding problems  PSYCHIATRIC: NEGATIVE for changes in mood or affect      Objective    /61 (BP Location: Left arm, Patient Position: Sitting, Cuff Size: Adult Large)   Pulse 83   Temp 98.1  F (36.7  C) (Oral)   Resp 18   Ht 1.6 m (5' 3\")   LMP  (LMP Unknown)   SpO2 99%   Breastfeeding No   BMI 26.93 kg/m     Physical Exam   GENERAL: alert, in moderate distress, frail and elderly  EYES: visual fields normal and conjunctivae and sclerae normal  HENT: normal cephalic/atraumatic and oral mucous membranes moist  NECK: no adenopathy  RESP: Coarse inspiratory crackles at the right middle lobe.  CV: irregularly irregular rhythm  ABDOMEN: tenderness epigastric, LUQ and suprapubic and bowel sounds normal  MS: extremities normal- no gross deformities noted  SKIN: no suspicious lesions or rashes  NEURO: Normal strength and tone, mentation intact and speech normal  PSYCH: mentation appears normal, anxious, fatigued and judgement and insight intact    Diagnostic Test Results:  Labs reviewed in Epic        Assessment & Plan     1. Other acute gastritis without hemorrhage  Etiology of epigastric pain.  -Continue Pantoprazole 40 mg twice a day.      2. Congestive heart failure with preserved LV function, NYHA class 3 (H)  Multiple hospitalization, which necessitates hospice care.    3. Chronic interstitial lung disease (H)  Most likely cause of \"air hunger\" plus condition #2.  - HYDROmorphone (DILAUDID) 2 MG tablet; Take 0.5 tablets (1 mg) by mouth every 6 hours as needed for pain (air hunger)  Dispense: 12 tablet; Refill: 0    4. Hypokalemia  Necessary when taking Furosemide.  - potassium chloride (KAYCIEL) 20 MEQ/15ML (10%) solution; Take 15 " "mLs (20 mEq) by mouth 2 times daily  Dispense: 900 mL; Refill: 5     BMI:   Estimated body mass index is 26.93 kg/m  as calculated from the following:    Height as of this encounter: 1.6 m (5' 3\").    Weight as of 11/24/19: 68.9 kg (152 lb).   Weight management plan: None. Pursue hospice.        FUTURE APPOINTMENTS:       - Follow-up visit as needed.      Tre Lockett MD  [facility]      "

## 2019-11-29 NOTE — Clinical Note
MOON MCINTYRE note, thanks!Vanessa Mitchell, PharmDMedication Therapy Management [phone]

## 2019-11-29 NOTE — PROGRESS NOTES
SUBJECTIVE/OBJECTIVE:                Linda Spicer is a 88 year old female called for a transitions of care visit.  She was discharged from [facility] on 11/26/19 for epigastric abdominal pain, fatigue, and chronic shortness of breath.  Also recent hospitalization for diverticulitis.     From discharge summary:  Linda is a 88 year old female admitted on 11/24/2019. She has a history of history of HFpEF, MR s/p mitral clip placement, paroxysmal a fib, tachy/lisa syndrome s/p pacemaker, RA with associated RA-ILD, CKD III, and hypothyroidism and is admitted for epigastric abdominal pain, fatigue, and chronic shortness of breath.      Chief Complaint: Which supplements to continue.  Personal Healthcare Goals: Will be deciding on palliative care vs hospice.     Allergies/ADRs: Reviewed in Epic  Tobacco: No tobacco use   Alcohol: 1-3 beverages / week  Caffeine: no caffeine  Activity: had a good Thanksgiving with family  PMH: Reviewed in Epic    Medication Adherence/Access:  Patient uses pill box(es). Fills two weeks at a time.   Patient takes medications 4 time(s) per day.   Per patient, misses medication 0 times per week.   Medication barriers: none.   The patient fills medications at [place]: NO, fills [facility]    Epigastric pain/air hunger:    Pantoprazole 40 mg twice daily   Hydromorphone 1 mg q6h as needed (air hunger)  Senna-docusate 8.6-50 mg daily as needed    She states the hydromorphone is helpful, it relaxes things and she is able to breathe better. Doesn't notice if it's causing much drowsiness as she's just fatigued all the time.     HFpEF/AFib/HTN:   aspirin 81 mg daily  Eliquis 2.5 mg twice daily  furosemide 40 mg every morning/20 mg every evening   potassium 20 mEq liquid twice daily (changed to this today)  hydralazine 20 mg three times daily  isosorbide mononitrate 60 mg daily  Has Nitrostat SL available PRN, but has not needed.     Pt was taking furosemide 40 mg am/20 mg evening the  last few days since discharge ,didn't realize she should have eliminated evening dose, per discharge notes.  Patient reports no current medication side effects.  Denies bleeding.   ECHO:  Date 9/26/19, EF 60-65%  Pt is not complaining of sx of HF - does have some shortness of breath (air hunger), but not worsened since home.  Pt is measuring daily weights and reports decreased.   Patient does self-monitor BP. Home BP monitoring in range of 110-120's systolic over 70's diastolic.   Pt is following a low sodium diet, is not avoiding EtOH.    BP Readings from Last 3 Encounters:   11/29/19 106/61   11/26/19 127/73   11/21/19 102/65     RA:   azathioprine 50 mg daily    She'll be stopping Orencia IV. Pt finds this to be effective. Pt reports no current issues.     Recurrent UTI: Currently taking trimethoprim 50 mg daily, cranberry daily, and compounded Estriol cream twice weekly. Thinks the cranberry hasn't been very helpful, because she's still gotten UTIs while being on .    Hypothyroidism: Patient is taking levothyroxine 75 mcg daily. Patient is having the following symptoms: none.   TSH   Date Value Ref Range Status   11/13/2019 0.38 (L) 0.40 - 4.00 mU/L Final     Osteoporosis:   Prolia every 6 months.  Stopping calcium/vitamin D daily.    Unsure if she will continue Prolia, last injection 6/20/19. Pt is not experiencing side effects.  Pt is getting the following sources of dietary calcium: milk  Last vitamin D level: n/a  DEXA History: T-score: -2.7 (6/26/17)  Risk factors: post-menopausal    Macular Degeneration/Supplements: Pt is taking eye vitamins twice daily, fish oil 1 gram twice daily, and receiving regular eye injections. Denies any current issues.  Linda prefers to continue these until talking with eye doctor and cardiologist.    Supplements:   Preservision daily  Fish oil  Stopping cranberry after done with bottle.  Stopping probiotic  Stopping vitamin C  Stopping calcium and Vitamin D    Creatinine    Date Value Ref Range Status   11/26/2019 1.37 (H) 0.52 - 1.04 mg/dL Final     Estimated Creatinine Clearance: 26.4 mL/min (A) (based on SCr of 1.37 mg/dL (H)).    Today's Vitals: LMP  (LMP Unknown) -phone visit    ASSESSMENT:                 Medication Adherence: good, no issues identified    Epigastric pain/air hunger:  Improved.    HFpEF/AFib/HTN: Stable.    RA: Stable.     Recurrent UTI: Needs improvement. Ok to stop cranberry d/t ineffective and pill burden.    Hypothyroidism: Stable .    Osteoporosis: Needs improvement. If pt is to remain on Prolia, pt may benefit from calcium and vitamin D.     Macular Degeneration/Supplements: Needs improvement. Fish oil and preservation likely not necessary, but pt prefers to discuss with eye doctor.      Supplements: as above.    PLAN:                Post Discharge Medication Reconciliation Status: discharge medications reconciled, continue medications without change.    1. If you continue with the Prolia injections, I would recommend restarting the calcium and vitamin D.    2. Fish oil and eye vitamin are likely not necessary, but you can discuss with with cardiology and eye doctor as well.    I spent 30 minutes with this patient today. All changes were made via collaborative practice agreement with Tre Lockett. A copy of the visit note was provided to the patient's primary care provider.    The patient was mailed a summary of these recommendations as an after visit summary.    Vanessa Mitchell, PharmD  Medication Therapy Management Pharmacist  [phone]

## 2019-12-06 ENCOUNTER — MEDICAL CORRESPONDENCE (OUTPATIENT)
Dept: HEALTH INFORMATION MANAGEMENT | Facility: CLINIC | Age: 84
End: 2019-12-06

## 2019-12-08 PROBLEM — K29.00 OTHER ACUTE GASTRITIS WITHOUT HEMORRHAGE: Status: ACTIVE | Noted: 2019-12-08

## 2019-12-09 ENCOUNTER — TELEPHONE (OUTPATIENT)
Dept: FAMILY MEDICINE | Facility: CLINIC | Age: 84
End: 2019-12-09

## 2019-12-09 DIAGNOSIS — E87.6 HYPOKALEMIA: ICD-10-CM

## 2019-12-09 RX ORDER — POTASSIUM CHLORIDE 20MEQ/15ML
20 LIQUID (ML) ORAL 2 TIMES DAILY
Qty: 2700 ML | Refills: 1 | Status: SHIPPED | OUTPATIENT
Start: 2019-12-09 | End: 2019-12-10

## 2019-12-09 NOTE — TELEPHONE ENCOUNTER
Prescription approved per [facility] Refill Protocol for 90 day supply.      Carmenza Miller RN, BSN, PHN

## 2019-12-09 NOTE — TELEPHONE ENCOUNTER
Pharmacy asking for 90 day supply,  2700.0     .  potassium chloride (KAYCIEL) 20 MEQ/15ML (10%) solution 900 mL 5 11/29/2019

## 2019-12-10 ENCOUNTER — TELEPHONE (OUTPATIENT)
Dept: PALLIATIVE CARE | Facility: CLINIC | Age: 84
End: 2019-12-10

## 2019-12-10 NOTE — TELEPHONE ENCOUNTER
[facility] Call Center    Phone Message    May a detailed message be left on voicemail: yes    Reason for Call: Other: Pt son would like call back to ask General questions regarding 12/11 appt     Action Taken: Message routed to:  [facility] Onc

## 2019-12-10 NOTE — TELEPHONE ENCOUNTER
Spoke with patient's son and answered his questions. They are wanting hospice care along with infusions per rheumatology. He has a few calls out to another hospice agency as well as to have MD with hospice discuss these infusions with Rheum MD. Also suggested he look into [facility]. We agreed to push apt back a few weeks for now pending these hospice discussions and him coordinating with his 5 other siblings (patient also does not want apt for tomorrow due to weather).     Provided my direct contact info for arising questions.

## 2019-12-15 ENCOUNTER — HEALTH MAINTENANCE LETTER (OUTPATIENT)
Age: 84
End: 2019-12-15

## 2019-12-15 DIAGNOSIS — E03.9 HYPOTHYROIDISM, UNSPECIFIED TYPE: ICD-10-CM

## 2019-12-15 NOTE — TELEPHONE ENCOUNTER
"Requested Prescriptions   Pending Prescriptions Disp Refills     levothyroxine (SYNTHROID/LEVOTHROID) 75 MCG tablet [Pharmacy Med Name: LEVOTHYROXINE 75 MCG TABLET]  Last Written Prescription Date:  9/18/19  Last Fill Quantity: 90,  # refills: 0   Last Office Visit with G, P or [facility] prescribing provider:  11/29/19   Future Office Visit:      90 tablet 0     Sig: TAKE 1 TABLET BY MOUTH EVERY DAY       Thyroid Protocol Failed - 12/15/2019  9:56 AM        Failed - Normal TSH on file in past 12 months     Recent Labs   Lab Test 11/13/19  1257   TSH 0.38*              Passed - Patient is 12 years or older        Passed - Recent (12 mo) or future (30 days) visit within the authorizing provider's specialty     Patient has had an office visit with the authorizing provider or a provider within the authorizing providers department within the previous 12 mos or has a future within next 30 days. See \"Patient Info\" tab in inbasket, or \"Choose Columns\" in Meds & Orders section of the refill encounter.              Passed - Medication is active on med list        Passed - No active pregnancy on record     If patient is pregnant or has had a positive pregnancy test, please check TSH.          Passed - No positive pregnancy test in past 12 months     If patient is pregnant or has had a positive pregnancy test, please check TSH.            "

## 2019-12-16 ENCOUNTER — TELEPHONE (OUTPATIENT)
Dept: RHEUMATOLOGY | Facility: CLINIC | Age: 84
End: 2019-12-16

## 2019-12-16 RX ORDER — LEVOTHYROXINE SODIUM 75 UG/1
TABLET ORAL
Qty: 90 TABLET | Refills: 0 | Status: SHIPPED | OUTPATIENT
Start: 2019-12-16 | End: 2020-04-02

## 2019-12-16 NOTE — TELEPHONE ENCOUNTER
[facility] called back, they would like a letter stating if patients cardiac and RA/lung issue is related or not, they are trying to keep patient on her orencia because it helps with her pain. Dr. Davenport has been notified and will write a letter.  Nicole Will CMA Rheumatology  12/16/2019 2:36 PM   [facility]  [phone]       Waiting for call back from [facility] for clarification.  Nicole Will CMA Rheumatology  12/16/2019 1:41 PM

## 2019-12-16 NOTE — TELEPHONE ENCOUNTER
Reason for Call:  Other Narrative from Dr. Davenport    Detailed comments: Adrianne with [facility] called regarding  patient's heart failure and arthritic / lung disease.    Please write a Narrative stating they are NOT related and stopping the Orencia and starting Prednisone.    Please fax Narrative to:    Adrianne @ [facility]    [phone] - Fax    Phone Number Patient can be reached at: Other phone number:      Best Time: n/a    Can we leave a detailed message on this number? Not Applicable    Call taken on 12/16/2019 at 11:49 AM by Louise Alberts

## 2019-12-16 NOTE — LETTER
[facility]  [address] [place] [address]      12/18/2019       Linda Spicer  [address]   [place] [address]      To Whom It May Concern:    Ms. Spicer has rheumatoid arthritis and rheumatoid arthritis associated interstitial lung disease.  Orencia is used to help control rheumatoid arthritis inflammation and therefore helps to control pain associated with rheumatoid arthritis.  While people with rheumatoid arthritis are at a higher risk for cardiovascular disease, Ms. Spicer' cardiovascular issues are not clearly due to rheumatoid arthritis.     Sincerely,      Mario Davenport MD  Rheumatology  12/18/2019 4:47 PM

## 2019-12-17 NOTE — TELEPHONE ENCOUNTER
Routing refill request to provider for review/approval because:  Labs out of range:  TSH    Sena Lloyd RN, [facility]

## 2019-12-18 ENCOUNTER — TELEPHONE (OUTPATIENT)
Dept: FAMILY MEDICINE | Facility: CLINIC | Age: 84
End: 2019-12-18

## 2019-12-18 ENCOUNTER — TELEPHONE (OUTPATIENT)
Dept: CARDIOLOGY | Facility: CLINIC | Age: 84
End: 2019-12-18

## 2019-12-18 DIAGNOSIS — I48.19 PERSISTENT ATRIAL FIBRILLATION (H): ICD-10-CM

## 2019-12-18 NOTE — TELEPHONE ENCOUNTER
Reason for Call:  Home Health Care    Minna with [facility]  called regarding (reason for call):     Orders are needed for this patient.     [facility] calling to get a verbal hospice order to evaluate and admit to care.      Pt Provider: Dr. Toledo Vocal    Phone Number Homecare Nurse can be reached at: [phone]    Can we leave a detailed message on this number? YES    Best Time: anytime    Call taken on 12/18/2019 at 2:49 PM by Siva Swanson

## 2019-12-18 NOTE — TELEPHONE ENCOUNTER
Pt states she has problems breathing and is going on hospice care.    She wanted to make sure that Dr. Loza is aware she is going into Hospice. She thought that her kids have already been talking with Dr. Loza.     Valery Egan CMA on 12/18/2019 at 3:49 PM

## 2019-12-19 ENCOUNTER — INFUSION THERAPY VISIT (OUTPATIENT)
Dept: INFUSION THERAPY | Facility: CLINIC | Age: 84
End: 2019-12-19
Payer: MEDICARE

## 2019-12-19 ENCOUNTER — DOCUMENTATION ONLY (OUTPATIENT)
Dept: SPIRITUAL SERVICES | Facility: CLINIC | Age: 84
End: 2019-12-19

## 2019-12-19 VITALS
TEMPERATURE: 98.5 F | WEIGHT: 154.1 LBS | OXYGEN SATURATION: 98 % | BODY MASS INDEX: 27.3 KG/M2 | HEART RATE: 80 BPM | DIASTOLIC BLOOD PRESSURE: 82 MMHG | SYSTOLIC BLOOD PRESSURE: 129 MMHG

## 2019-12-19 DIAGNOSIS — I50.33 ACUTE ON CHRONIC DIASTOLIC HEART FAILURE (H): ICD-10-CM

## 2019-12-19 DIAGNOSIS — M05.79 RHEUMATOID ARTHRITIS INVOLVING MULTIPLE SITES WITH POSITIVE RHEUMATOID FACTOR (H): Primary | ICD-10-CM

## 2019-12-19 LAB
ANION GAP SERPL CALCULATED.3IONS-SCNC: 6 MMOL/L (ref 3–14)
BUN SERPL-MCNC: 28 MG/DL (ref 7–30)
CALCIUM SERPL-MCNC: 8.9 MG/DL (ref 8.5–10.1)
CHLORIDE SERPL-SCNC: 110 MMOL/L (ref 94–109)
CO2 SERPL-SCNC: 24 MMOL/L (ref 20–32)
CREAT SERPL-MCNC: 1.3 MG/DL (ref 0.52–1.04)
GFR SERPL CREATININE-BSD FRML MDRD: 36 ML/MIN/{1.73_M2}
GLUCOSE SERPL-MCNC: 103 MG/DL (ref 70–99)
POTASSIUM SERPL-SCNC: 4.4 MMOL/L (ref 3.4–5.3)
SODIUM SERPL-SCNC: 140 MMOL/L (ref 133–144)

## 2019-12-19 PROCEDURE — 96365 THER/PROPH/DIAG IV INF INIT: CPT | Performed by: NURSE PRACTITIONER

## 2019-12-19 PROCEDURE — 99207 ZZC NO CHARGE NURSE ONLY: CPT

## 2019-12-19 PROCEDURE — 80048 BASIC METABOLIC PNL TOTAL CA: CPT | Performed by: NURSE PRACTITIONER

## 2019-12-19 ASSESSMENT — PAIN SCALES - GENERAL: PAINLEVEL: MODERATE PAIN (5)

## 2019-12-19 NOTE — PROGRESS NOTES
Infusion Nursing Note:  Linda Spicer presents today for Orencia.    Patient seen by provider today: No   present during visit today: Not Applicable.    Note: Pt admits to being placed on hospice due to her heart problems and many hospitalizations this past year.  Hoping the orencia will help with her arthritis pain that she is experiencing in her ankle and wrists.    Intravenous Access:  Lab draw site L hand, Needle type smith, Gauge 24.  Labs drawn without difficulty.  Peripheral IV placed.    Treatment Conditions:  Biological Infusion Checklist:  ~~~ NOTE: If the patient answers yes to any of the questions below, hold the infusion and contact ordering provider or on-call provider.    1. Have you recently had an elevated temperature, fever, chills, productive cough, coughing for 3 weeks or longer or hemoptysis, abnormal vital signs, night sweats,  chest pain or have you noticed a decrease in your appetite, unexplained weight loss or fatigue? No  2. Do you have any open wounds or new incisions? No  3. Do you have any recent or upcoming hospitalizations, surgeries or dental procedures? No  4. Do you currently have or recently have had any signs of illness or infection or are you on any antibiotics? No  5. Have you had any new, sudden or worsening abdominal pain? No  6. Have you or anyone in your household received a live vaccination in the past 4 weeks? Please note:  No live vaccines while on biologic/chemotherapy until 6 months after the last treatment.  Patient can receive the flu vaccine (shot only) and the pneumovax.  It is optimal for the patient to get these vaccines mid cycle, but they can be given at any time as long as it is not on the day of the infusion. No  7. Have you recently been diagnosed with any new nervous system diseases (ie. Multiple sclerosis, Guillain Jbphh, seizures, neurological changes) or cancer diagnosis? No  8. Are you on any form of radiation or chemotherapy? No  9. Are you  pregnant or breast feeding or do you have plans of pregnancy in the future? No  10. Have you been having any signs of worsening depression or suicidal ideations?  (benlysta only) No  11. Have there been any other new onset medical symptoms? No        Post Infusion Assessment:  Patient tolerated infusion without incident.  Blood return noted pre and post infusion.  Site patent and intact, free from redness, edema or discomfort.  No evidence of extravasations.  Access discontinued per protocol.       Discharge Plan:   Patient discharged in stable condition accompanied by: son.  Departure Mode: Wheelchair.  Pt will RTC 1/16/20 for Yuliana.    John Hernandez RN

## 2019-12-19 NOTE — TELEPHONE ENCOUNTER
SPIRITUAL HEALTH SERVICES Progress Note  [facility]    Visited Linda Spicer for ongoing emotional/spiritual support in the infusion area. Her son Leonardo was also present.        Illness Narrative - Patient states that she is getting weaker.  She has more medical assistance coming into her apartment to help with bathing, etc.  They will have a consult from [facility] tomorrow.       Distress - Patient was short of breath at times during our discussion today. She openly discussed her thoughts about hospice, she wants to make sure that she can still receive the infusion medication that she has been receiving here.   She voiced that she has no concerns about what is ahead for her, her debbie sustains her.       Coping - She feels that her children are working for her best interest and talked about how appreciative she is for that. Quaker communion and prayer were offered and shared at her request.       Meaning-Making - Her family and her debbie give her life meaning.       Plan - Patient and family  know that they  can request a  visit as needed.  She would appreciate a  visit at any time while she is here for infusions.     Adrianne Gomez  Staff   [phone]

## 2019-12-19 NOTE — TELEPHONE ENCOUNTER
Luis Lim MA faxed the letter to Adrianne at [phone] on 12/18/2019.  Nicole Will CMA Rheumatology  12/19/2019 9:51 AM

## 2019-12-20 DIAGNOSIS — I10 HYPERTENSION GOAL BP (BLOOD PRESSURE) < 150/90: ICD-10-CM

## 2019-12-20 NOTE — TELEPHONE ENCOUNTER
[facility] Call Center    Phone Message    May a detailed message be left on voicemail: yes    Reason for Call: Medication Refill Request    Has the patient contacted the pharmacy for the refill? Yes   Name of medication being requested: isosorbide mononitrate (IMDUR) 60 MG 24 hr tablet  Provider who prescribed the medication: Karla Mabry NP  Pharmacy: [facility]/PHARMACY #[id number] [place], [place] - [address]  Date medication is needed: 12/20/19         Action Taken: Message routed to:  [facility] HATTIE Cardio

## 2019-12-23 ENCOUNTER — TELEPHONE (OUTPATIENT)
Dept: FAMILY MEDICINE | Facility: CLINIC | Age: 84
End: 2019-12-23

## 2019-12-23 DIAGNOSIS — Z51.5 HOSPICE CARE PATIENT: Primary | ICD-10-CM

## 2019-12-23 DIAGNOSIS — I10 HYPERTENSION GOAL BP (BLOOD PRESSURE) < 150/90: ICD-10-CM

## 2019-12-23 DIAGNOSIS — J84.9 CHRONIC INTERSTITIAL LUNG DISEASE (H): ICD-10-CM

## 2019-12-23 RX ORDER — ISOSORBIDE MONONITRATE 60 MG/1
60 TABLET, EXTENDED RELEASE ORAL DAILY
Qty: 30 TABLET | Refills: 0 | Status: SHIPPED | OUTPATIENT
Start: 2019-12-23

## 2019-12-23 RX ORDER — ISOSORBIDE MONONITRATE 60 MG/1
60 TABLET, EXTENDED RELEASE ORAL DAILY
Qty: 60 TABLET | Refills: 0 | Status: SHIPPED | OUTPATIENT
Start: 2019-12-23 | End: 2019-12-23

## 2019-12-23 NOTE — TELEPHONE ENCOUNTER
...Reason for call:  Medication   If this is a refill request, has the caller requested the refill from the pharmacy already? Yes  Will the patient be using a [facility]? No  Name of the pharmacy and phone number for the current request: [facility]/PHARMACY #[id number] - [place], [place] - [address]    Name of the medication requested:   HYDROmorphone (DILAUDID) 2 MG tablet    Other request: Patient sent request through pharmacy but the pharmacy said that since it is a controlled substance, Dr. Lockett will need to write a written script.    Phone number to reach patient:  Home number on file [phone] (home)    Best Time:  anytime    Can we leave a detailed message on this number?  YES

## 2019-12-23 NOTE — TELEPHONE ENCOUNTER
Patient is signing onto [facility] 12/23/19. 1 month refill sent to pharmacy and noted to have hospice refill going forward.

## 2019-12-23 NOTE — TELEPHONE ENCOUNTER
Reason for call:  Order   Order or referral being requested: start of care hospice  Reason for request: asap  Date needed: as soon as possible  Has the patient been seen by the PCP for this problem? YES    Additional comments: she only has 48 hours to get this if it can be done as soon as possible      Phone number to reach home care Sofie [phone]    Best Time:  any    Can we leave a detailed message on this number?  YES

## 2019-12-23 NOTE — TELEPHONE ENCOUNTER
Requested Prescriptions   Pending Prescriptions Disp Refills     HYDROmorphone (DILAUDID) 2 MG tablet 12 tablet 0     Sig: Take 0.5 tablets (1 mg) by mouth every 6 hours as needed for pain (air hunger)       There is no refill protocol information for this order        Last Written Prescription Date:  11/29/19  Last Fill Quantity: 12,  # refills: 0   Last Office Visit with G, P or [facility] prescribing provider:  11/29/19   Future Office Visit:

## 2019-12-23 NOTE — TELEPHONE ENCOUNTER
Refer to telephone encounter below, dated 12/18/2019.    Me          12/18/19 3:19 PM   Note      Informed Sweta RN from [facility] and I gave verbal order for hospice.                12/18/19 2:51 PM   Siva Swanson routed this conversation to  1st Floor Primary Care   Siva Swanson          12/18/19 2:51 PM   Note      Reason for Call:  Home Health Care     Minna with [facility]  called regarding (reason for call):      Orders are needed for this patient.      [facility] calling to get a verbal hospice order to evaluate and admit to care.       Pt Provider: Dr. Toledo Vocal     Phone Number Homecare Nurse can be reached at: [phone]     Can we leave a detailed message on this number? YES     Best Time: anytime     Call taken on 12/18/2019 at 2:49 PM by Siva Swanson

## 2019-12-23 NOTE — TELEPHONE ENCOUNTER
Called and and informed Radha of the okay to start hospice. She notes that they have to do a double verification.    Sena Lloyd RN, [facility] Triage

## 2019-12-24 RX ORDER — HYDROMORPHONE HYDROCHLORIDE 2 MG/1
1 TABLET ORAL EVERY 6 HOURS PRN
Qty: 12 TABLET | Refills: 0 | Status: SHIPPED | OUTPATIENT
Start: 2019-12-24

## 2019-12-24 NOTE — TELEPHONE ENCOUNTER
Controlled Substance Refill Request for Hydromorphone  Problem List Complete:  No     PROVIDER TO CONSIDER COMPLETION OF PROBLEM LIST AND OVERVIEW/CONTROLLED SUBSTANCE AGREEMENT    Last Written Prescription Date:  11/29/19  Last Fill Quantity: 12 tablets   # refills: 0    THE MOST RECENT OFFICE VISIT MUST BE WITHIN THE PAST 3 MONTHS. AT LEAST ONE FACE TO FACE VISIT MUST OCCUR EVERY 6 MONTHS. ADDITIONAL VISITS CAN BE VIRTUAL.  (THIS STATEMENT SHOULD BE DELETED.)    Last Office Visit with [facility] primary care provider: 11/29/19    Future Office visit: None    Controlled substance agreement:   Encounter-Level CSA:    There are no encounter-level csa.     Patient-Level CSA:    There are no patient-level csa.         Last Urine Drug Screen: No results found for: CDAUT, No results found for: COMDAT, No results found for: THC13, PCP13, COC13, MAMP13, OPI13, AMP13, BZO13, TCA13, MTD13, BAR13, OXY13, PPX13, BUP13     Processing:  Rx to be electronically transmitted to pharmacy by provider      [email or web address]       checked in past 3 months?  No-problem list incomplete so  not checked.         Carmenza Miller RN, BSN, PHN

## 2020-01-03 ENCOUNTER — TRANSFERRED RECORDS (OUTPATIENT)
Dept: HEALTH INFORMATION MANAGEMENT | Facility: CLINIC | Age: 85
End: 2020-01-03

## 2020-01-06 DIAGNOSIS — Z53.9 DIAGNOSIS NOT YET DEFINED: Primary | ICD-10-CM

## 2020-01-06 PROCEDURE — G0180 MD CERTIFICATION HHA PATIENT: HCPCS | Performed by: INTERNAL MEDICINE

## 2020-01-06 RX ORDER — LEVOTHYROXINE SODIUM 75 UG/1
75 TABLET ORAL DAILY
Qty: 90 TABLET | Refills: 0 | Status: CANCELLED | OUTPATIENT
Start: 2020-01-06

## 2020-01-06 NOTE — TELEPHONE ENCOUNTER
.Reason for Call:  Medication or medication refill:    Do you use a [facility]?  Name of the pharmacy and phone number for the current request:  [facility] [phone]    Name of the medication requested: levothyroxine    Other request: calling as they did not get the Rx from Dec;   Asking for a verbal.   Can we leave a detailed message on this number? Not Applicable    Phone number patient can be reached at: Other phone number:  [phone], pharmacist    Best Time: any    Call taken on 1/6/2020 at 10:46 AM by Radha Martins

## 2020-01-07 ENCOUNTER — DOCUMENTATION ONLY (OUTPATIENT)
Dept: OTHER | Facility: CLINIC | Age: 85
End: 2020-01-07

## 2020-01-07 ENCOUNTER — MEDICAL CORRESPONDENCE (OUTPATIENT)
Dept: HEALTH INFORMATION MANAGEMENT | Facility: CLINIC | Age: 85
End: 2020-01-07

## 2020-01-07 ENCOUNTER — AMBULATORY - HEALTHEAST (OUTPATIENT)
Dept: OTHER | Facility: CLINIC | Age: 85
End: 2020-01-07

## 2020-01-08 ENCOUNTER — MEDICAL CORRESPONDENCE (OUTPATIENT)
Dept: HEALTH INFORMATION MANAGEMENT | Facility: CLINIC | Age: 85
End: 2020-01-08

## 2020-01-16 DIAGNOSIS — N39.0 CHRONIC UTI: ICD-10-CM

## 2020-01-16 RX ORDER — TRIMETHOPRIM 100 MG/1
50 TABLET ORAL DAILY
Qty: 45 TABLET | Refills: 3 | Status: SHIPPED | OUTPATIENT
Start: 2020-01-16

## 2020-01-16 NOTE — TELEPHONE ENCOUNTER
"[facility] Call Center    Phone Message    May a detailed message be left on voicemail: yes    Reason for Call: Medication Refill Request    Has the patient contacted the pharmacy for the refill? Yes   Name of medication being requested: trimethoprim (TRIMPEX) 100 MG tablet [51600] (Order [id number])   Provider who prescribed the medication: Nurse thought Asia Steven did patient \"has so many doctors she may not have been the prescriber\", but it was requested writer send a message to see.  Pharmacy: [facility]    Fax: [phone]  Date medication is needed: N/A    Action Taken: Message routed to:  Adult Clinics: Urology p [id number]  "

## 2020-01-16 NOTE — TELEPHONE ENCOUNTER
Medication request: trimethoprim (TRIMPEX) 100 MG tablet  Sig: Take 0.5 tablets (50 mg) by mouth daily  Prescription written: 11/26/19  Last Qty: 7 ordered by hospital discharging MD; 45 tablets previously prescribed by Asia Steven PA-C  Pt's last office visit: 9/19/19 with Dr. Martins and per Dr. Martins, patient was to continue trimethoprim   Next scheduled office visit: none    Medication not on refill protocol. Will send message to Dr. Martins with request to review and advise.    Sena Chapman RN, BSN

## 2020-01-16 NOTE — TELEPHONE ENCOUNTER
Discussed with Dr. Martins and brandon to refill trimethoprim 50mg daily. Trimethoprim reordered per Dr. Martins and sent for Dr. Martins to review and sign. Prescription sent to [facility] in [place] per nurse request.     Sena Chapman RN, BSN

## 2020-01-21 ENCOUNTER — INFUSION THERAPY VISIT (OUTPATIENT)
Dept: INFUSION THERAPY | Facility: CLINIC | Age: 85
End: 2020-01-21
Payer: MEDICARE

## 2020-01-21 ENCOUNTER — DOCUMENTATION ONLY (OUTPATIENT)
Dept: SPIRITUAL SERVICES | Facility: CLINIC | Age: 85
End: 2020-01-21

## 2020-01-21 ENCOUNTER — TELEPHONE (OUTPATIENT)
Dept: RHEUMATOLOGY | Facility: CLINIC | Age: 85
End: 2020-01-21

## 2020-01-21 VITALS — BODY MASS INDEX: 27.19 KG/M2 | WEIGHT: 153.5 LBS | RESPIRATION RATE: 16 BRPM

## 2020-01-21 DIAGNOSIS — M05.79 RHEUMATOID ARTHRITIS INVOLVING MULTIPLE SITES WITH POSITIVE RHEUMATOID FACTOR (H): Primary | ICD-10-CM

## 2020-01-21 PROCEDURE — 99207 ZZC NO CHARGE NURSE ONLY: CPT

## 2020-01-21 NOTE — TELEPHONE ENCOUNTER
SPIRITUAL HEALTH SERVICES  SPIRITUAL ASSESSMENT Progress Note  [facility]       REFERRAL SOURCE: Patient requested a Spiritual Health encounter during infusion clinic today.     Visited with Linda and her son Leonardo at her request and offered reflective conversation, affirmation, and spiritual support.  Patient states that she has had a UTI and is not sure if she will be able to have her infusion today. Patient shared that she continues to have help at [facility]. She did sign up for hospice a few weeks ago and now has been receiving care from that organization.   Patient was eager to talk but was short of breath during our conversation.   She requested prayer and Mosque communion. By the end of the visit the patient was accepting that she would not be able to have the infusion today.    PLAN: Patient knows that she can request  support as needed.     Adrianne Gomez  Staff   [phone]

## 2020-01-21 NOTE — TELEPHONE ENCOUNTER
Infusion center called, pt has UTI and is on abx since the 1/19/20. I let them know they must hold infusion per MD order and pt must be infection free for 2 weeks. BRANDI Luis verbalized and repeated back understanding.    Kaela Sharp RN Specialty Triage 1/21/2020 9:43 AM

## 2020-01-21 NOTE — PROGRESS NOTES
Infusion Nursing Note:  Linda Spicer presents today for Orencia - NOT GIVEN.    Patient seen by provider today: No   present during visit today: Not Applicable.    Note: Pt started amoxicillin on 1/19 for UTI. Dr. Davenport was paged.     Per Kaela PAZ, Triage nurse at [facility] , pt needs to be free of infection for 2 weeks.     Pt will reschedule Orencia infusion for once UTI has cleared.     Trinity Varela, RN

## 2020-01-22 ENCOUNTER — TELEPHONE (OUTPATIENT)
Dept: UROLOGY | Facility: CLINIC | Age: 85
End: 2020-01-22

## 2020-01-22 DIAGNOSIS — N39.0 RECURRENT UTI: Primary | ICD-10-CM

## 2020-01-22 DIAGNOSIS — N39.0 RECURRENT UTI: ICD-10-CM

## 2020-01-22 LAB
ALBUMIN UR-MCNC: NEGATIVE MG/DL
APPEARANCE UR: CLEAR
BILIRUB UR QL STRIP: NEGATIVE
COLOR UR AUTO: YELLOW
GLUCOSE UR STRIP-MCNC: NEGATIVE MG/DL
HGB UR QL STRIP: NEGATIVE
KETONES UR STRIP-MCNC: NEGATIVE MG/DL
LEUKOCYTE ESTERASE UR QL STRIP: NEGATIVE
NITRATE UR QL: NEGATIVE
PH UR STRIP: 7 PH (ref 5–7)
SOURCE: NORMAL
SP GR UR STRIP: 1.02 (ref 1–1.03)
UROBILINOGEN UR STRIP-ACNC: 0.2 EU/DL (ref 0.2–1)

## 2020-01-22 PROCEDURE — 81003 URINALYSIS AUTO W/O SCOPE: CPT | Performed by: PHYSICIAN ASSISTANT

## 2020-01-22 NOTE — TELEPHONE ENCOUNTER
"----- Message from Asia Steven PA-C sent at 1/22/2020  2:53 PM CST -----  Please notify patient that UA is completely negative. She should see her PCP to look for other causes of \"kidney pain.\"  "

## 2020-01-22 NOTE — TELEPHONE ENCOUNTER
[facility] Call Center    Phone Message    May a detailed message be left on voicemail: yes    Reason for Call: Other: patient's son called and would like to know if a urine sample can be brought in to be tested for UTI, pt is currently on hospice for lungs and heart. It would be allot to bring pt in and hospice wont treat a UTI. please advise     Action Taken: Message routed to:  Adult Clinics: Urology p [id number]

## 2020-01-22 NOTE — TELEPHONE ENCOUNTER
Left generic voicemail for patient to return call to clinic. Will relay that per Asia Steven PA-C, the urine sample had no signs of infection and patient should follow up with PCP for the kidney pain.    Asia Bryan LPN

## 2020-01-22 NOTE — TELEPHONE ENCOUNTER
Relayed result notes from Asia Steven PA-C. Patient stated understanding and will talk to a doctor about her kidney pain. Patient had no further questions.    Asia Bryan LPN

## 2020-01-22 NOTE — TELEPHONE ENCOUNTER
Spoke to patient who stated Sunday started amoxicillin from hospice which is not working. Patient reports Kidney pain. Requesting a UA to be ordered. LPN ordered UA for patient to complete. Patient stated how grateful she was and had no further questions. Patient understands we will reach out when results are available.    Asia Bryan LPN

## 2020-02-03 ENCOUNTER — INFUSION THERAPY VISIT (OUTPATIENT)
Dept: INFUSION THERAPY | Facility: CLINIC | Age: 85
End: 2020-02-03
Payer: MEDICARE

## 2020-02-03 VITALS
DIASTOLIC BLOOD PRESSURE: 78 MMHG | BODY MASS INDEX: 27.42 KG/M2 | WEIGHT: 154.8 LBS | RESPIRATION RATE: 24 BRPM | OXYGEN SATURATION: 98 % | SYSTOLIC BLOOD PRESSURE: 139 MMHG | HEART RATE: 81 BPM | TEMPERATURE: 97.9 F

## 2020-02-03 DIAGNOSIS — M05.79 RHEUMATOID ARTHRITIS INVOLVING MULTIPLE SITES WITH POSITIVE RHEUMATOID FACTOR (H): Primary | ICD-10-CM

## 2020-02-03 PROCEDURE — 96365 THER/PROPH/DIAG IV INF INIT: CPT | Mod: GW | Performed by: NURSE PRACTITIONER

## 2020-02-03 PROCEDURE — 99207 ZZC NO CHARGE NURSE ONLY: CPT

## 2020-02-03 NOTE — PROGRESS NOTES
Infusion Nursing Note:  Linda Spicer presents today for orencia.    Patient seen by provider today: No   present during visit today: Not Applicable.    Note: N/A.    Intravenous Access:  Peripheral IV placed.    Treatment Conditions:  Biological Infusion Checklist:  ~~~ NOTE: If the patient answers yes to any of the questions below, hold the infusion and contact ordering provider or on-call provider.    1. Have you recently had an elevated temperature, fever, chills, productive cough, coughing for 3 weeks or longer or hemoptysis, abnormal vital signs, night sweats,  chest pain or have you noticed a decrease in your appetite, unexplained weight loss or fatigue? No  2. Do you have any open wounds or new incisions? No  3. Do you have any recent or upcoming hospitalizations, surgeries or dental procedures? No  4. Do you currently have or recently have had any signs of illness or infection or are you on any antibiotics? No  5. Have you had any new, sudden or worsening abdominal pain? No  6. Have you or anyone in your household received a live vaccination in the past 4 weeks? Please note:  No live vaccines while on biologic/chemotherapy until 6 months after the last treatment.  Patient can receive the flu vaccine (shot only) and the pneumovax.  It is optimal for the patient to get these vaccines mid cycle, but they can be given at any time as long as it is not on the day of the infusion. No  7. Have you recently been diagnosed with any new nervous system diseases (ie. Multiple sclerosis, Guillain [place], seizures, neurological changes) or cancer diagnosis? No  8. Are you on any form of radiation or chemotherapy? No  9. Are you pregnant or breast feeding or do you have plans of pregnancy in the future? No  10. Have you been having any signs of worsening depression or suicidal ideations?  (benlysta only) No  11. Have there been any other new onset medical symptoms? No        Post Infusion Assessment:  Patient  tolerated infusion without incident.  Site patent and intact, free from redness, edema or discomfort.  No evidence of extravasations.  Access discontinued per protocol.       Discharge Plan:   Patient and/or family verbalized understanding of discharge instructions and all questions answered.    Leda Rod RN

## 2020-02-19 ENCOUNTER — MEDICAL CORRESPONDENCE (OUTPATIENT)
Dept: HEALTH INFORMATION MANAGEMENT | Facility: CLINIC | Age: 85
End: 2020-02-19

## 2020-02-26 ENCOUNTER — ANCILLARY PROCEDURE (OUTPATIENT)
Dept: CARDIOLOGY | Facility: CLINIC | Age: 85
End: 2020-02-26
Attending: NURSE PRACTITIONER
Payer: MEDICARE

## 2020-02-26 DIAGNOSIS — I49.5 SSS (SICK SINUS SYNDROME) (H): ICD-10-CM

## 2020-02-26 PROCEDURE — 93294 REM INTERROG EVL PM/LDLS PM: CPT | Mod: ZP | Performed by: INTERNAL MEDICINE

## 2020-02-26 PROCEDURE — 93296 REM INTERROG EVL PM/IDS: CPT | Mod: ZF

## 2020-02-29 LAB
MDC_IDC_LEAD_IMPLANT_DT: NORMAL
MDC_IDC_LEAD_IMPLANT_DT: NORMAL
MDC_IDC_LEAD_LOCATION: NORMAL
MDC_IDC_LEAD_LOCATION: NORMAL
MDC_IDC_LEAD_LOCATION_DETAIL_1: NORMAL
MDC_IDC_LEAD_LOCATION_DETAIL_1: NORMAL
MDC_IDC_LEAD_MFG: NORMAL
MDC_IDC_LEAD_MFG: NORMAL
MDC_IDC_LEAD_MODEL: NORMAL
MDC_IDC_LEAD_MODEL: NORMAL
MDC_IDC_LEAD_POLARITY_TYPE: NORMAL
MDC_IDC_LEAD_POLARITY_TYPE: NORMAL
MDC_IDC_LEAD_SERIAL: NORMAL
MDC_IDC_LEAD_SERIAL: NORMAL
MDC_IDC_MSMT_BATTERY_DTM: NORMAL
MDC_IDC_MSMT_BATTERY_REMAINING_LONGEVITY: 61 MO
MDC_IDC_MSMT_BATTERY_RRT_TRIGGER: 2.83
MDC_IDC_MSMT_BATTERY_STATUS: NORMAL
MDC_IDC_MSMT_BATTERY_VOLTAGE: 3 V
MDC_IDC_MSMT_LEADCHNL_RA_IMPEDANCE_VALUE: 342 OHM
MDC_IDC_MSMT_LEADCHNL_RA_IMPEDANCE_VALUE: 475 OHM
MDC_IDC_MSMT_LEADCHNL_RA_PACING_THRESHOLD_AMPLITUDE: 1 V
MDC_IDC_MSMT_LEADCHNL_RA_PACING_THRESHOLD_PULSEWIDTH: 0.4 MS
MDC_IDC_MSMT_LEADCHNL_RA_SENSING_INTR_AMPL: 0.75 MV
MDC_IDC_MSMT_LEADCHNL_RA_SENSING_INTR_AMPL: 0.75 MV
MDC_IDC_MSMT_LEADCHNL_RV_IMPEDANCE_VALUE: 361 OHM
MDC_IDC_MSMT_LEADCHNL_RV_IMPEDANCE_VALUE: 418 OHM
MDC_IDC_MSMT_LEADCHNL_RV_PACING_THRESHOLD_AMPLITUDE: 0.62 V
MDC_IDC_MSMT_LEADCHNL_RV_PACING_THRESHOLD_PULSEWIDTH: 0.4 MS
MDC_IDC_MSMT_LEADCHNL_RV_SENSING_INTR_AMPL: 20 MV
MDC_IDC_MSMT_LEADCHNL_RV_SENSING_INTR_AMPL: 20 MV
MDC_IDC_PG_IMPLANT_DTM: NORMAL
MDC_IDC_PG_MFG: NORMAL
MDC_IDC_PG_MODEL: NORMAL
MDC_IDC_PG_SERIAL: NORMAL
MDC_IDC_PG_TYPE: NORMAL
MDC_IDC_SESS_CLINIC_NAME: NORMAL
MDC_IDC_SESS_DTM: NORMAL
MDC_IDC_SESS_TYPE: NORMAL
MDC_IDC_SET_BRADY_HYSTRATE: NORMAL
MDC_IDC_SET_BRADY_LOWRATE: 80 {BEATS}/MIN
MDC_IDC_SET_BRADY_MAX_SENSOR_RATE: 130 {BEATS}/MIN
MDC_IDC_SET_BRADY_MODE: NORMAL
MDC_IDC_SET_LEADCHNL_RA_SENSING_ANODE_ELECTRODE_1: NORMAL
MDC_IDC_SET_LEADCHNL_RA_SENSING_ANODE_LOCATION_1: NORMAL
MDC_IDC_SET_LEADCHNL_RA_SENSING_CATHODE_ELECTRODE_1: NORMAL
MDC_IDC_SET_LEADCHNL_RA_SENSING_CATHODE_LOCATION_1: NORMAL
MDC_IDC_SET_LEADCHNL_RA_SENSING_POLARITY: NORMAL
MDC_IDC_SET_LEADCHNL_RA_SENSING_SENSITIVITY: 0.3 MV
MDC_IDC_SET_LEADCHNL_RV_PACING_AMPLITUDE: 1.5 V
MDC_IDC_SET_LEADCHNL_RV_PACING_ANODE_ELECTRODE_1: NORMAL
MDC_IDC_SET_LEADCHNL_RV_PACING_ANODE_LOCATION_1: NORMAL
MDC_IDC_SET_LEADCHNL_RV_PACING_CAPTURE_MODE: NORMAL
MDC_IDC_SET_LEADCHNL_RV_PACING_CATHODE_ELECTRODE_1: NORMAL
MDC_IDC_SET_LEADCHNL_RV_PACING_CATHODE_LOCATION_1: NORMAL
MDC_IDC_SET_LEADCHNL_RV_PACING_POLARITY: NORMAL
MDC_IDC_SET_LEADCHNL_RV_PACING_PULSEWIDTH: 0.4 MS
MDC_IDC_SET_LEADCHNL_RV_SENSING_ANODE_ELECTRODE_1: NORMAL
MDC_IDC_SET_LEADCHNL_RV_SENSING_ANODE_LOCATION_1: NORMAL
MDC_IDC_SET_LEADCHNL_RV_SENSING_CATHODE_ELECTRODE_1: NORMAL
MDC_IDC_SET_LEADCHNL_RV_SENSING_CATHODE_LOCATION_1: NORMAL
MDC_IDC_SET_LEADCHNL_RV_SENSING_POLARITY: NORMAL
MDC_IDC_SET_LEADCHNL_RV_SENSING_SENSITIVITY: 0.9 MV
MDC_IDC_SET_ZONE_DETECTION_INTERVAL: 350 MS
MDC_IDC_SET_ZONE_DETECTION_INTERVAL: 400 MS
MDC_IDC_SET_ZONE_TYPE: NORMAL
MDC_IDC_STAT_AT_BURDEN_PERCENT: 100 %
MDC_IDC_STAT_AT_DTM_END: NORMAL
MDC_IDC_STAT_AT_DTM_START: NORMAL
MDC_IDC_STAT_BRADY_AP_VP_PERCENT: 0 %
MDC_IDC_STAT_BRADY_AP_VS_PERCENT: 0 %
MDC_IDC_STAT_BRADY_AS_VP_PERCENT: 98.89 %
MDC_IDC_STAT_BRADY_AS_VS_PERCENT: 1.11 %
MDC_IDC_STAT_BRADY_DTM_END: NORMAL
MDC_IDC_STAT_BRADY_DTM_START: NORMAL
MDC_IDC_STAT_BRADY_RA_PERCENT_PACED: 0 %
MDC_IDC_STAT_BRADY_RV_PERCENT_PACED: 99.06 %
MDC_IDC_STAT_EPISODE_RECENT_COUNT: 0
MDC_IDC_STAT_EPISODE_RECENT_COUNT_DTM_END: NORMAL
MDC_IDC_STAT_EPISODE_RECENT_COUNT_DTM_START: NORMAL
MDC_IDC_STAT_EPISODE_TOTAL_COUNT: 0
MDC_IDC_STAT_EPISODE_TOTAL_COUNT: 0
MDC_IDC_STAT_EPISODE_TOTAL_COUNT: 4
MDC_IDC_STAT_EPISODE_TOTAL_COUNT: 601
MDC_IDC_STAT_EPISODE_TOTAL_COUNT_DTM_END: NORMAL
MDC_IDC_STAT_EPISODE_TOTAL_COUNT_DTM_START: NORMAL
MDC_IDC_STAT_EPISODE_TYPE: NORMAL

## 2020-03-03 ENCOUNTER — INFUSION THERAPY VISIT (OUTPATIENT)
Dept: INFUSION THERAPY | Facility: CLINIC | Age: 85
End: 2020-03-03
Payer: MEDICARE

## 2020-03-03 VITALS
TEMPERATURE: 98.6 F | RESPIRATION RATE: 20 BRPM | SYSTOLIC BLOOD PRESSURE: 112 MMHG | OXYGEN SATURATION: 98 % | DIASTOLIC BLOOD PRESSURE: 69 MMHG | BODY MASS INDEX: 27 KG/M2 | HEART RATE: 77 BPM | WEIGHT: 152.4 LBS

## 2020-03-03 DIAGNOSIS — Z79.899 HIGH RISK MEDICATIONS (NOT ANTICOAGULANTS) LONG-TERM USE: ICD-10-CM

## 2020-03-03 DIAGNOSIS — N18.30 CKD (CHRONIC KIDNEY DISEASE) STAGE 3, GFR 30-59 ML/MIN (H): ICD-10-CM

## 2020-03-03 DIAGNOSIS — M05.79 RHEUMATOID ARTHRITIS INVOLVING MULTIPLE SITES WITH POSITIVE RHEUMATOID FACTOR (H): ICD-10-CM

## 2020-03-03 DIAGNOSIS — M05.79 RHEUMATOID ARTHRITIS INVOLVING MULTIPLE SITES WITH POSITIVE RHEUMATOID FACTOR (H): Primary | ICD-10-CM

## 2020-03-03 DIAGNOSIS — M81.0 AGE-RELATED OSTEOPOROSIS WITHOUT CURRENT PATHOLOGICAL FRACTURE: ICD-10-CM

## 2020-03-03 LAB
ALBUMIN SERPL-MCNC: 3.6 G/DL (ref 3.4–5)
ALP SERPL-CCNC: 60 U/L (ref 40–150)
ALT SERPL W P-5'-P-CCNC: 19 U/L (ref 0–50)
AST SERPL W P-5'-P-CCNC: 20 U/L (ref 0–45)
BASOPHILS # BLD AUTO: 0 10E9/L (ref 0–0.2)
BASOPHILS NFR BLD AUTO: 0.6 %
BILIRUB DIRECT SERPL-MCNC: 0.1 MG/DL (ref 0–0.2)
BILIRUB SERPL-MCNC: 0.5 MG/DL (ref 0.2–1.3)
CREAT SERPL-MCNC: 1.31 MG/DL (ref 0.52–1.04)
DIFFERENTIAL METHOD BLD: ABNORMAL
EOSINOPHIL # BLD AUTO: 0.1 10E9/L (ref 0–0.7)
EOSINOPHIL NFR BLD AUTO: 2.3 %
ERYTHROCYTE [DISTWIDTH] IN BLOOD BY AUTOMATED COUNT: 13.8 % (ref 10–15)
GFR SERPL CREATININE-BSD FRML MDRD: 36 ML/MIN/{1.73_M2}
HCT VFR BLD AUTO: 36.5 % (ref 35–47)
HGB BLD-MCNC: 12.2 G/DL (ref 11.7–15.7)
IMM GRANULOCYTES # BLD: 0 10E9/L (ref 0–0.4)
IMM GRANULOCYTES NFR BLD: 0.6 %
LYMPHOCYTES # BLD AUTO: 0.6 10E9/L (ref 0.8–5.3)
LYMPHOCYTES NFR BLD AUTO: 11.6 %
MCH RBC QN AUTO: 32.9 PG (ref 26.5–33)
MCHC RBC AUTO-ENTMCNC: 33.4 G/DL (ref 31.5–36.5)
MCV RBC AUTO: 98 FL (ref 78–100)
MONOCYTES # BLD AUTO: 0.5 10E9/L (ref 0–1.3)
MONOCYTES NFR BLD AUTO: 9.5 %
NEUTROPHILS # BLD AUTO: 3.6 10E9/L (ref 1.6–8.3)
NEUTROPHILS NFR BLD AUTO: 75.4 %
PLATELET # BLD AUTO: 245 10E9/L (ref 150–450)
PROT SERPL-MCNC: 7.4 G/DL (ref 6.8–8.8)
RBC # BLD AUTO: 3.71 10E12/L (ref 3.8–5.2)
WBC # BLD AUTO: 4.8 10E9/L (ref 4–11)

## 2020-03-03 PROCEDURE — 85025 COMPLETE CBC W/AUTO DIFF WBC: CPT | Mod: GW | Performed by: INTERNAL MEDICINE

## 2020-03-03 PROCEDURE — 96372 THER/PROPH/DIAG INJ SC/IM: CPT | Mod: 59 | Performed by: NURSE PRACTITIONER

## 2020-03-03 PROCEDURE — 82565 ASSAY OF CREATININE: CPT | Mod: GW | Performed by: INTERNAL MEDICINE

## 2020-03-03 PROCEDURE — 36415 COLL VENOUS BLD VENIPUNCTURE: CPT | Performed by: INTERNAL MEDICINE

## 2020-03-03 PROCEDURE — 99207 ZZC NO CHARGE LOS: CPT

## 2020-03-03 PROCEDURE — 80076 HEPATIC FUNCTION PANEL: CPT | Mod: GW | Performed by: INTERNAL MEDICINE

## 2020-03-03 PROCEDURE — 96365 THER/PROPH/DIAG IV INF INIT: CPT | Mod: GW | Performed by: NURSE PRACTITIONER

## 2020-03-03 ASSESSMENT — PAIN SCALES - GENERAL: PAINLEVEL: NO PAIN (0)

## 2020-03-03 NOTE — PROGRESS NOTES
Infusion Nursing Note:  Linda Spicer presents today for Orencia/Prolia.    Patient seen by provider today: No   present during visit today: Not Applicable.    Note: N/A.    Intravenous Access:  Peripheral IV placed.    Treatment Conditions:  Biological Infusion Checklist:  ~~~ NOTE: If the patient answers yes to any of the questions below, hold the infusion and contact ordering provider or on-call provider.    1. Have you recently had an elevated temperature, fever, chills, productive cough, coughing for 3 weeks or longer or hemoptysis, abnormal vital signs, night sweats,  chest pain or have you noticed a decrease in your appetite, unexplained weight loss or fatigue? No  2. Do you have any open wounds or new incisions? No  3. Do you have any recent or upcoming hospitalizations, surgeries or dental procedures? No  4. Do you currently have or recently have had any signs of illness or infection or are you on any antibiotics? No  5. Have you had any new, sudden or worsening abdominal pain? No  6. Have you or anyone in your household received a live vaccination in the past 4 weeks? Please note:  No live vaccines while on biologic/chemotherapy until 6 months after the last treatment.  Patient can receive the flu vaccine (shot only) and the pneumovax.  It is optimal for the patient to get these vaccines mid cycle, but they can be given at any time as long as it is not on the day of the infusion. No  7. Have you recently been diagnosed with any new nervous system diseases (ie. Multiple sclerosis, Guillain [place], seizures, neurological changes) or cancer diagnosis? No  8. Are you on any form of radiation or chemotherapy? No  9. Are you pregnant or breast feeding or do you have plans of pregnancy in the future? No  10. Have you been having any signs of worsening depression or suicidal ideations?  (benlysta only) No  11. Have there been any other new onset medical symptoms? No    Post Infusion  Assessment:  Patient tolerated infusion without incident.  Patient tolerated injection without incident.  Site patent and intact, free from redness, edema or discomfort.  No evidence of extravasations.  Access discontinued per protocol.       Discharge Plan:   Patient directed to scheduling.  Patient discharged in stable condition accompanied by: self and son.  Departure Mode: Wheelchair.    Trinity Varela RN

## 2020-03-04 ENCOUNTER — MEDICAL CORRESPONDENCE (OUTPATIENT)
Dept: HEALTH INFORMATION MANAGEMENT | Facility: CLINIC | Age: 85
End: 2020-03-04

## 2020-03-17 ENCOUNTER — MEDICAL CORRESPONDENCE (OUTPATIENT)
Dept: HEALTH INFORMATION MANAGEMENT | Facility: CLINIC | Age: 85
End: 2020-03-17

## 2020-03-18 ENCOUNTER — MEDICAL CORRESPONDENCE (OUTPATIENT)
Dept: HEALTH INFORMATION MANAGEMENT | Facility: CLINIC | Age: 85
End: 2020-03-18

## 2020-04-01 ENCOUNTER — MEDICAL CORRESPONDENCE (OUTPATIENT)
Dept: HEALTH INFORMATION MANAGEMENT | Facility: CLINIC | Age: 85
End: 2020-04-01

## 2020-04-03 ENCOUNTER — MEDICAL CORRESPONDENCE (OUTPATIENT)
Dept: HEALTH INFORMATION MANAGEMENT | Facility: CLINIC | Age: 85
End: 2020-04-03

## 2020-04-15 ENCOUNTER — MEDICAL CORRESPONDENCE (OUTPATIENT)
Dept: HEALTH INFORMATION MANAGEMENT | Facility: CLINIC | Age: 85
End: 2020-04-15

## 2020-04-16 DIAGNOSIS — M81.0 AGE-RELATED OSTEOPOROSIS WITHOUT CURRENT PATHOLOGICAL FRACTURE: Primary | ICD-10-CM

## 2020-04-17 ENCOUNTER — TELEPHONE (OUTPATIENT)
Dept: ENDOCRINOLOGY | Facility: CLINIC | Age: 85
End: 2020-04-17

## 2020-04-17 NOTE — TELEPHONE ENCOUNTER
[facility] Call Center    Phone Message    May a detailed message be left on voicemail: yes, contact Xi [phone]    Reason for Call: Other: called patient's daughterSue to schedule Osteoprosis check in August 2020 with dexa prior per staff message copied below.  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 Please advise.    Action Taken: Message routed to:  Adult Clinics: Endocrinology p [id number]    Travel Screening: Not Applicable     Sent: 4/16/2020  10:32 AM CDT   To: [facility] Endocrinology Adult [place]     Hello,     Please schedule this pt to be seen in August 2020 with DXA scan at  prior to the visit if possible. Last seen was 2 years ago.     Thanks,  Tomi

## 2020-04-17 NOTE — TELEPHONE ENCOUNTER
Will review with Dr. Peña.      Vanessa Myrick, RN  Endocrine Care Coordinator  [facility]

## 2020-04-20 NOTE — TELEPHONE ENCOUNTER
Per Dr. Peña:    Is she still getting Prolia? I think she is?   How is her hospice rule or condition?     [facility]             Contacted patient's daughter, Xi, to review further. As per Epic review, it appears that patient had her last Prolia injection on 3/3/20. Xi notes that patient was put on long term hospice the end of December 2019 due to her heart failure and her lung diseases. They were not certain on a timeline. Currently patient's biggest concern is her difficulty breathing. Xi notes that due to everything with COVID, she has not been able to see her mother. Xi is going to be talking with her mother today and she is going to discuss if patient is wanting to pursue DEXA scan and office visit with Dr. Peña in August. Jennifer agrees to update clinic. Phone number provided.       Will update Dr. Peña.       Vanessa Myrick, RN  Endocrine Care Coordinator  [facility]

## 2020-04-20 NOTE — TELEPHONE ENCOUNTER
4/20 Called and spoke to patient. At this time she only wanted to schedule DEXA scan.     Iris Aaron   Procedure    Ortho/Sports Med/Ent/Eye   [facility]   [phone]

## 2020-04-20 NOTE — TELEPHONE ENCOUNTER
Contacted Xi to further review. Xi notes that she talked with her brother and they decided at this time just to schedule the DEXA and office visit with Dr. Peña for August and if they need to cancel in the future, then they will.       Will request clinic coordinator contact Xi to schedule patient for DEXA scan and office visit in August.         Vanessa Myrick RN  Endocrine Care Coordinator  [facility]

## 2020-04-20 NOTE — TELEPHONE ENCOUNTER
[facility] Call Center    Phone Message    May a detailed message be left on voicemail: yes     Reason for Call: The patient's daughter returning a call to Dr. Krishna's Care Team.  She will wait for a call back. Thank you.     Action Taken: Message routed to:  Adult Clinics: Endocrinology p [id number]    Travel Screening: Not Applicable

## 2020-04-24 ENCOUNTER — TELEPHONE (OUTPATIENT)
Dept: RHEUMATOLOGY | Facility: CLINIC | Age: 85
End: 2020-04-24

## 2020-04-24 NOTE — TELEPHONE ENCOUNTER
Reason for Call:  Other call back    Detailed comments: Patient wanting to discus how to schedule considering she's on hospice     Phone Number Patient can be reached at: Home number on file [phone] (home)    Best Time: Anytime.    Can we leave a detailed message on this number? YES    Call taken on 4/24/2020 at 3:23 PM by Bronwyn King

## 2020-04-27 NOTE — TELEPHONE ENCOUNTER
Called and spoke with patient who is concerned about coming in to clinic for her apt on 5/5/20 due to COVID-19. RN advised that we have switched to telemedicine visits and converted patient's apt to a phone visit (patient declined video.)    Nhan Patel RN....4/27/2020 11:31 AM

## 2020-04-29 DIAGNOSIS — Z95.0 CARDIAC PACEMAKER IN SITU: Primary | ICD-10-CM

## 2020-05-04 ENCOUNTER — INFUSION THERAPY VISIT (OUTPATIENT)
Dept: INFUSION THERAPY | Facility: CLINIC | Age: 85
End: 2020-05-04
Payer: MEDICARE

## 2020-05-04 VITALS
BODY MASS INDEX: 27.28 KG/M2 | RESPIRATION RATE: 22 BRPM | HEART RATE: 82 BPM | DIASTOLIC BLOOD PRESSURE: 77 MMHG | TEMPERATURE: 97.9 F | SYSTOLIC BLOOD PRESSURE: 133 MMHG | WEIGHT: 154 LBS | OXYGEN SATURATION: 99 %

## 2020-05-04 DIAGNOSIS — M05.79 RHEUMATOID ARTHRITIS INVOLVING MULTIPLE SITES WITH POSITIVE RHEUMATOID FACTOR (H): Primary | ICD-10-CM

## 2020-05-04 PROCEDURE — 99207 ZZC NO CHARGE LOS: CPT

## 2020-05-04 PROCEDURE — 96365 THER/PROPH/DIAG IV INF INIT: CPT | Mod: GW | Performed by: NURSE PRACTITIONER

## 2020-05-04 ASSESSMENT — PAIN SCALES - GENERAL: PAINLEVEL: NO PAIN (0)

## 2020-05-04 NOTE — PROGRESS NOTES
Infusion Nursing Note:  Linda Spicer presents today for Orencia.    Patient seen by provider today: No   present during visit today: Not Applicable.    Note: N/A.    Intravenous Access:  Peripheral IV placed.    Treatment Conditions:  Biological Infusion Checklist:  ~~~ NOTE: If the patient answers yes to any of the questions below, hold the infusion and contact ordering provider or on-call provider.    1. Have you recently had an elevated temperature, fever, chills, productive cough, coughing for 3 weeks or longer or hemoptysis, abnormal vital signs, night sweats,  chest pain or have you noticed a decrease in your appetite, unexplained weight loss or fatigue? No  2. Do you have any open wounds or new incisions? No  3. Do you have any recent or upcoming hospitalizations, surgeries or dental procedures? No  4. Do you currently have or recently have had any signs of illness or infection or are you on any antibiotics? No  5. Have you had any new, sudden or worsening abdominal pain? No  6. Have you or anyone in your household received a live vaccination in the past 4 weeks? Please note:  No live vaccines while on biologic/chemotherapy until 6 months after the last treatment.  Patient can receive the flu vaccine (shot only) and the pneumovax.  It is optimal for the patient to get these vaccines mid cycle, but they can be given at any time as long as it is not on the day of the infusion. No  7. Have you recently been diagnosed with any new nervous system diseases (ie. Multiple sclerosis, Guillain [place], seizures, neurological changes) or cancer diagnosis? No  8. Are you on any form of radiation or chemotherapy? No  9. Are you pregnant or breast feeding or do you have plans of pregnancy in the future? No  10. Have you been having any signs of worsening depression or suicidal ideations?  (benlysta only) No  11. Have there been any other new onset medical symptoms? No    Post Infusion Assessment:  Patient  tolerated infusion without incident.  Site patent and intact, free from redness, edema or discomfort.  No evidence of extravasations.  Access discontinued per protocol.       Discharge Plan:   Patient directed to scheduling.   Patient discharged in stable condition accompanied by: self.  Departure Mode: Wheelchair.    Trinity Varela RN

## 2020-05-05 ENCOUNTER — VIRTUAL VISIT (OUTPATIENT)
Dept: RHEUMATOLOGY | Facility: CLINIC | Age: 85
End: 2020-05-05
Payer: MEDICARE

## 2020-05-05 DIAGNOSIS — M05.9 SEROPOSITIVE RHEUMATOID ARTHRITIS (H): Primary | ICD-10-CM

## 2020-05-05 DIAGNOSIS — J84.9 ILD (INTERSTITIAL LUNG DISEASE) (H): ICD-10-CM

## 2020-05-05 DIAGNOSIS — Z79.899 HIGH RISK MEDICATIONS (NOT ANTICOAGULANTS) LONG-TERM USE: ICD-10-CM

## 2020-05-05 PROCEDURE — 99442 ZZC PHYSICIAN TELEPHONE EVALUATION 11-20 MIN: CPT | Mod: GW | Performed by: INTERNAL MEDICINE

## 2020-05-05 NOTE — PROGRESS NOTES
"Linda Spicer is a 88 year old female who is being evaluated via a billable telephone visit.      The patient has been notified of following:     \"This telephone visit will be conducted via a call between you and your physician/provider. We have found that certain health care needs can be provided without the need for a physical exam.  This service lets us provide the care you need with a short phone conversation.  If a prescription is necessary we can send it directly to your pharmacy.  If lab work is needed we can place an order for that and you can then stop by our lab to have the test done at a later time.    Telephone visits are billed at different rates depending on your insurance coverage. During this emergency period, for some insurers they may be billed the same as an in-person visit.  Please reach out to your insurance provider with any questions.    If during the course of the call the physician/provider feels a telephone visit is not appropriate, you will not be charged for this service.\"    Patient has given verbal consent for Telephone visit?  Yes    What phone number would you like to be contacted at? [phone]    How would you like to obtain your AVS? Mail a copy    Rheumatology Telephone/Telehealth Visit      Linda Spicer MRN# [id number]   YOB: 1931 Age: 88 year old      Date of visit: 5/05/20   PCP: Dr. Tre Lockett  Pulmonology: [facility]   Cardiology: Dr. Emeterio Loza  Dermatology: Dr. Andrews Knott at [facility] in [place]     Chief Complaint   Patient presents with:  Arthritis: RA    Assessment and Plan     1. Seropositive Nonerosive Rheumatoid Arthritis (RF 99, CCP 52): Previously treated with hydroxychloroquine 200 mg daily (stopped previously because of macular degeneration), methotrexate (leg cramps), Cimzia (stopped due to recurrent basal cell carcinoma and hx of heart failure). Has sulfa allergy. Leflunomide avoided because of ILD.  Currently on " Orencia IV and azathioprine 50mg daily. Doing well today with regard to RA. On hospice currently due to breathing issues.  Finds that holding Orencia results in more joint pain.   - Continue orencia 750mg IV every 4 weeks, administered  at the [facility]  - Continue azathioprine 50mg daily    2. Bilateral shoulder impingement syndrome, history: Maintaining ROM with exercises at home. She was previously referred to PT but did not go. Not an issue today.     3. History of basal cell carcinoma: Reportedly with lesions removed in 2007, fall 2016 and fall 2017.    4. Heart failure: She is followed by cardiology.     5. Pulmonary fibrosis / RA associated ILD / UIP: following with pulmonology      6. Bone Health: Managed by PCP / endocrinology. She doesn't want to continue prolia or get a DEXA; she says she is on hospice currently and doesn't think she'll live long.  Okay from my perspective to stop prolia and not get the DEXA considering hospice status.     # Relevant labs and imaging were reviewed with the patient    # High risk medication toxicity monitoring: discussion and labs reviewed; appropriate labs ordered. See above    # Note that this is a virtual visit to reduce the risk of COVID-19 exposure during this current pandemic.      Ms. Spicer verbalized agreement with and understanding of the rational for the diagnosis and treatment plan.  All questions were answered to best of my ability and the patient's satisfaction. Ms. Spicer was advised to contact the clinic with any questions that may arise after the clinic visit.      Thank you for involving me in the care of the patient    Return to clinic: 6 months; she says that she will call if an appointment is needed; currently on hospice and she says she may not live very long      HPI   Linda Spicre is a 88 year old female with a medical history significant for hypertension, heart failure, pulmonary fibrosis, audible bowel syndrome, degenerative  disc disease of the lumbar spine status post laminectomy, chronic kidney disease, history of basal cell carcinoma, and rheumatoid arthritis who presents for follow-up of rheumatoid arthritis.    Today, she reports that she is doing well with regard to rheumatoid arthritis; delayed orencia IV but felt worse so is continuing it. Shortness of breath currently and is on hospice. Doesn't want to get dexa or continue prolia.  Happy to have good hospice staff and family able to visit her.     Denies fevers, chills, nausea, vomiting. No abdominal pain. No chest pain/pressure, palpitations.  Chronic SOB. No oral or nasal sores. No neck pain.   No photosensitivity or photophobia.   No eye pain or redness.     Tobacco: None  EtOH: rare  Drugs: none  Used to live in [place], [place] part time.    ROS   GEN: No fevers, chills, night sweats, or weight change  SKIN: See HPI  HEENT: No epistaxis. No oral or nasal ulcers.  CV: See HPI  PULM: See HPI  GI: No nausea, vomiting. No blood in stool. No abdominal pain.  : No blood in urine.  MSK: See HPI.  EXT: No LE swelling  PSYCH: Negative    Active Problem List     Patient Active Problem List   Diagnosis     Hypertension goal BP (blood pressure) < 150/90     Hypothyroidism     Seropositive rheumatoid arthritis (H)     Macular degeneration, left eye     Irritable bowel syndrome     Encounter for palliative care     Adjustment disorder with anxious mood     Mild anemia     DDD (degenerative disc disease), lumbar     CKD (chronic kidney disease) stage 3, GFR 30-59 ml/min (H)     History of blood transfusion     Aftercare following surgery     S/P lumbar laminectomy     High risk medication use     Age-related osteoporosis without current pathological fracture     Atrophic vaginitis     Fecal incontinence     Female stress incontinence     Impingement syndrome of both shoulders     UIP (usual interstitial pneumonitis) (H)     High risk medications (not anticoagulants) long-term use     Heart  failure with preserved ejection fraction (H)     Congestive heart failure with preserved LV function, NYHA class 3 (H)     Other specified hypothyroidism     Rheumatoid arthritis involving multiple sites with positive rheumatoid factor (H)     Health Care Home     Sick sinus syndrome (H)     Cardiac pacemaker - Medtronic dual lead pacemaker - Not Dependent - MRI Safe     Immunosuppression (H)     Chronic interstitial lung disease (H)     Heart failure with preserved ejection fraction, NYHA class I (H)     Atrial flutter (H)     Persistent atrial fibrillation     CHF exacerbation (H)     Pre-syncope     Mitral regurgitation     SOB (shortness of breath)     Mitral valve insufficiency, unspecified etiology     Abnormal findings on diagnostic imaging of cardiovascular system     Status post coronary angiogram     Dyspnea     RUSSELL (dyspnea on exertion)     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     Swelling of right lower extremity     Enterococcus UTI     Renal insufficiency     Urinary retention     Acute respiratory distress     Diverticulitis large intestine     Abdominal pain     Other acute gastritis without hemorrhage     Past Medical History     Past Medical History:   Diagnosis Date     Adjustment disorder with anxious mood 5/18/2015     Advanced directives, counseling/discussion 8/30/2012    Patient states has Advance Directive and will bring in a copy to clinic. 8/30/2012   Tevin May  [facility] Medical Assistant \       Anemia 9/25/2015     Basal cell cancer      CHF (congestive heart failure) (H) 9/18/2014     CKD (chronic kidney disease) stage 3, GFR 30-59 ml/min (H) 9/29/2015     DDD (degenerative disc disease), lumbar 9/25/2015     Diffuse idiopathic pulmonary fibrosis (H) 5/6/2013     Encounter for palliative care 5/18/2015     History of blood transfusion 9/29/2015     Hypertension goal BP (blood pressure) < 140/90 9/7/2012     Hypothyroid 9/7/2012     Irritable bowel syndrome 10/29/2013      Macular degeneration      Macular degeneration, 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     Nondisplaced spiral fracture of shaft of humerus      Osteoporosis 8/13/2013     Imo Update utility     RA (rheumatoid arthritis) (H) 5/7/2013     Rheumatic fever      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 2/20/2019     Shingles      Spinal stenosis of lumbar region with neurogenic claudication 9/14/2015     Past Surgical History     Past Surgical History:   Procedure Laterality Date     ANESTHESIA CARDIOVERSION N/A 9/14/2018    Procedure: ANESTHESIA CARDIOVERSION;;  Surgeon: GENERIC ANESTHESIA PROVIDER;  Location: [facility] OR     ANESTHESIA CARDIOVERSION N/A 10/11/2018    Procedure: ANESTHESIA CARDIOVERSION;  Cardioversion;  Surgeon: GENERIC ANESTHESIA PROVIDER;  Location: [facility] OR     ANESTHESIA CARDIOVERSION N/A 10/16/2018    Procedure: Anesthesia Cardioversion ;  Surgeon: GENERIC ANESTHESIA PROVIDER;  [facility]     APPENDECTOMY       BIOPSY      hemorrhoidectomy     CV HEART CATHETERIZATION WITH POSSIBLE INTERVENTION N/A 1/31/2019    Procedure: CORS,LHC,RHC-YOLANDA BEFORE CATH APPOINTMENT;  Surgeon: Avila Watson MD;  Location:  HEART CARDIAC CATH LAB     CV MITRACLIP N/A 2/11/2019    Procedure: Mitraclip Procedure;  Surgeon: Avila Watson MD;  Location: [facility] OR     ENT SURGERY      tonsillectomy     GYN SURGERY      3 D & C's     HYSTERECTOMY, PAP NO LONGER INDICATED       LAMINECTOMY LUMBAR ONE LEVEL N/A 10/13/2015    Procedure: LAMINECTOMY LUMBAR ONE LEVEL;  Surgeon: Fransico Toussaint MD;  Location: [facility] OR     Allergy     Allergies   Allergen Reactions     Cephalexin Diarrhea and GI Disturbance     Other reaction(s): GI Upset       Gabapentin Other (See Comments)     Dizzsiness  Shaky, dizzy, dry mouth  Dizzsiness  Shaky,dry mouth       Methotrexate Other (See Comments)     Depleted Folic Acid  And caused severe leg cramps  Severe leg cramps     Naproxen GI Disturbance, Other (See Comments) and Nausea     Constipation.  Tolerates ibuprofen.       Perfume      Sulfa Drugs Shortness Of Breath     Throat swelling     Alprazolam Other (See Comments)     Dizziness      Levofloxacin GI Disturbance     Macrobid [Nitrofurantoin Anhydrous]      Possibly related to lung disease      Seasonal Allergies      Ciprofloxacin Itching and Rash     Current Medication List     Current Outpatient Medications   Medication Sig     apixaban ANTICOAGULANT (ELIQUIS ANTICOAGULANT) 2.5 MG tablet Take 1 tablet (2.5 mg) by mouth 2 times daily     aspirin 81 MG EC tablet Take 81 mg by mouth daily     azaTHIOprine (IMURAN) 50 MG tablet Take 1 tablet (50 mg) by mouth daily     carvedilol (COREG) 3.125 MG tablet Take 1 tablet (3.125 mg) by mouth 2 times daily (with meals)     COMPOUNDED NON-CONTROLLED SUBSTANCE (CMPD RX) - PHARMACY TO MIX COMPOUNDED MEDICATION Estriol 1mg/gram. Place 1 gram vaginally daily for 2 weeks. Then vaginally twice weekly (Patient taking differently: Place 1 g vaginally every 48 hours Estriol 1mg/gram cream)     fish oil-omega-3 fatty acids 1000 MG capsule Take 1 g by mouth 2 times daily     furosemide (LASIX) 20 MG tablet Take 2 tablets (40 mg) by mouth daily     hydrALAZINE (APRESOLINE) 10 MG tablet Take 2 tablets (20 mg) by mouth 3 times daily     HYDROmorphone (DILAUDID) 2 MG tablet Take 0.5 tablets (1 mg) by mouth every 6 hours as needed for pain (air hunger)     isosorbide mononitrate (IMDUR) 60 MG 24 hr tablet Take 1 tablet (60 mg) by mouth daily     levothyroxine (SYNTHROID/LEVOTHROID) 75 MCG tablet Take 1 tablet (75 mcg) by mouth daily     Multiple Vitamins-Minerals (PRESERVISION AREDS) CAPS Take 1 capsule by mouth 2 times daily     pantoprazole (PROTONIX) 40 MG EC tablet Take 1 tablet (40 mg) by mouth 2 times daily     potassium chloride (KAYCIEL) 20 MEQ/15ML (10%) solution Take 15 mLs (20 mEq) by mouth 2 times daily     senna-docusate (SENOKOT-S/PERICOLACE) 8.6-50 MG tablet Take 1 tablet by mouth daily as needed for constipation  "    trimethoprim (TRIMPEX) 100 MG tablet Take 0.5 tablets (50 mg) by mouth daily     acetaminophen (TYLENOL) 325 MG tablet Take 2 tablets (650 mg) by mouth every 4 hours as needed for mild pain (Patient not taking: Reported on 5/5/2020)     nitroGLYcerin (NITROSTAT) 0.4 MG sublingual tablet Place 1 tablet (0.4 mg) under the tongue every 5 minutes as needed for chest pain     order for DME Dispense SP Walker-small     order for DME Equipment being ordered: Dispense baffle, for use with nebulizer.     order for DME Equipment being ordered: Nebulizer. Use with Albuterol.     order for DME Equipment being ordered: Dispense face mask.  Mrs. Spicer is immunosuppressed due to rheumatoid arthritis.     No current facility-administered medications for this visit.        Social History   See HPI    Family History     Family History   Problem Relation Age of Onset     Hypertension Mother      Psychotic Disorder Father      Diabetes Son      Diabetes Daughter      Blood Disease Daughter      Physical Exam     Temp Readings from Last 3 Encounters:   05/04/20 97.9  F (36.6  C) (Oral)   03/03/20 98.6  F (37  C) (Oral)   02/03/20 97.9  F (36.6  C)     BP Readings from Last 5 Encounters:   05/04/20 133/77   03/03/20 112/69   02/03/20 139/78   12/19/19 129/82   11/29/19 106/61     Pulse Readings from Last 1 Encounters:   05/04/20 82     Resp Readings from Last 1 Encounters:   05/04/20 22     Estimated body mass index is 27.28 kg/m  as calculated from the following:    Height as of 11/29/19: 1.6 m (5' 3\").    Weight as of 5/4/20: 69.9 kg (154 lb).    Telephone visit    Taking several breaths during a sentence; she says this is why she is in hospice.     Labs / Imaging (select studies)   RF/CCP  Recent Labs   Lab Test 11/13/19  1257 04/05/16  1036   CCPIGG 12* 52*   RHF 43* 99*     CBC  Recent Labs   Lab Test 03/03/20  0826 11/26/19  0557 11/25/19  0601   WBC 4.8 5.1 6.2   RBC 3.71* 3.38* 3.46*   HGB 12.2 11.0* 11.6*   HCT 36.5 33.4* " 34.3*   MCV 98 99 99   RDW 13.8 13.2 13.2    278 274   MCH 32.9 32.5 33.5*   MCHC 33.4 32.9 33.8   NEUTROPHIL 75.4 59.6 67.5   LYMPH 11.6 21.3 15.5   MONOCYTE 9.5 12.8 12.3   EOSINOPHIL 2.3 5.3 3.4   BASOPHIL 0.6 0.8 0.8   ANEU 3.6 3.0 4.2   ALYM 0.6* 1.1 1.0   YEHUDA 0.5 0.7 0.8   AEOS 0.1 0.3 0.2   ABAS 0.0 0.0 0.1     CMP  Recent Labs   Lab Test 03/03/20  0826 12/19/19  0900 11/26/19  0557 11/25/19  0601   NA  --  140 135 137   POTASSIUM  --  4.4 4.3 4.0   CHLORIDE  --  110* 104 104   CO2  --  24 22 23   ANIONGAP  --  6 9 10   GLC  --  103* 87 88   BUN  --  28 32* 35*   CR 1.31* 1.30* 1.37* 1.59*   GFRESTIMATED 36* 36* 34* 29*   GFRESTBLACK 42* 42* 40* 33*   FATOUMATA  --  8.9 8.2* 8.8   BILITOTAL 0.5  --  0.4 0.5   ALBUMIN 3.6  --  2.9* 3.1*   PROTTOTAL 7.4  --  6.2* 6.5*   ALKPHOS 60  --  40 41   AST 20  --  13 16   ALT 19  --  16 17     Calcium/VitaminD  Recent Labs   Lab Test 12/19/19  0900 11/26/19  0557 11/25/19  0601   FATOUMATA 8.9 8.2* 8.8     ESR/CRP  Recent Labs   Lab Test 11/24/19  1150 11/13/19  1257 09/26/19  0334 09/25/19  1020   SED 32* 76* 18  --    CRP 2.9 152.0*  --  <2.9     Lipid Panel  Recent Labs   Lab Test 09/05/19  1007 08/09/18  0958 08/30/17  1214  05/22/14  0821   CHOL 196 156 146   < > 155   TRIG 120 51 101   < > 84   HDL 52 59 50   < > 42*   * 87 76   < > 97   VLDL  --   --   --   --  17   CHOLHDLRATIO  --   --   --   --  3.7   NHDL 144* 97 96   < >  --     < > = values in this interval not displayed.     Hepatitis B  Recent Labs   Lab Test 04/05/16  1036   HBCAB Nonreactive   HEPBANG Nonreactive     Hepatitis C  Recent Labs   Lab Test 04/05/16  1036   HCVAB Nonreactive   Assay performance characteristics have not been established for newborns,   infants, and children       Tuberculosis Screening  Recent Labs   Lab Test 02/21/17  1148 04/05/16  1037   TBRSLT Negative Negative   TBAGN 0.19 0.03     HIV Screening  Recent Labs   Lab Test 04/05/16  1036   HIAGAB Nonreactive   HIV-1 p24  Ag & HIV-1/HIV-2 Ab Not Detected         Immunization History     Immunization History   Administered Date(s) Administered     Influenza (High Dose) 3 valent vaccine 09/07/2012, 10/08/2013, 09/26/2014, 09/30/2015, 09/21/2016, 10/13/2017, 09/27/2018, 09/26/2019     Influenza (IIV3) PF 08/29/2011     Mantoux Tuberculin Skin Test 12/04/2018     Pneumo Conj 13-V (2010&after) 04/05/2016     Pneumococcal 23 valent 05/01/2001, 10/04/2010     TD (ADULT, 7+) 07/01/2008     TDAP Vaccine (Boostrix) 09/11/2017          Chart documentation done in part with Dragon Voice recognition Software. Although reviewed after completion, some word and grammatical error may remain.    Phone call start time: 10:24 AM  Phone call end time: 10:40 AM    This visit is equivalent to a 43804 visit    Location of patient: home  Location of provider: home    Follow up:  PRN (6 mo if able)    Mario Davenport MD  5/5/2020

## 2020-05-13 ENCOUNTER — MEDICAL CORRESPONDENCE (OUTPATIENT)
Dept: HEALTH INFORMATION MANAGEMENT | Facility: CLINIC | Age: 85
End: 2020-05-13

## 2020-05-13 ENCOUNTER — ANCILLARY PROCEDURE (OUTPATIENT)
Dept: CARDIOLOGY | Facility: CLINIC | Age: 85
End: 2020-05-13
Attending: INTERNAL MEDICINE
Payer: MEDICARE

## 2020-05-13 DIAGNOSIS — Z95.0 CARDIAC PACEMAKER IN SITU: ICD-10-CM

## 2020-05-13 PROCEDURE — 93296 REM INTERROG EVL PM/IDS: CPT | Mod: ZF

## 2020-05-13 PROCEDURE — 99207 CARDIAC DEVICE CHECK - REMOTE: CPT | Mod: ZP | Performed by: INTERNAL MEDICINE

## 2020-05-14 LAB
MDC_IDC_LEAD_IMPLANT_DT: NORMAL
MDC_IDC_LEAD_IMPLANT_DT: NORMAL
MDC_IDC_LEAD_LOCATION: NORMAL
MDC_IDC_LEAD_LOCATION: NORMAL
MDC_IDC_LEAD_LOCATION_DETAIL_1: NORMAL
MDC_IDC_LEAD_LOCATION_DETAIL_1: NORMAL
MDC_IDC_LEAD_MFG: NORMAL
MDC_IDC_LEAD_MFG: NORMAL
MDC_IDC_LEAD_MODEL: NORMAL
MDC_IDC_LEAD_MODEL: NORMAL
MDC_IDC_LEAD_POLARITY_TYPE: NORMAL
MDC_IDC_LEAD_POLARITY_TYPE: NORMAL
MDC_IDC_LEAD_SERIAL: NORMAL
MDC_IDC_LEAD_SERIAL: NORMAL
MDC_IDC_MSMT_BATTERY_DTM: NORMAL
MDC_IDC_MSMT_BATTERY_REMAINING_LONGEVITY: 54 MO
MDC_IDC_MSMT_BATTERY_RRT_TRIGGER: 2.83
MDC_IDC_MSMT_BATTERY_STATUS: NORMAL
MDC_IDC_MSMT_BATTERY_VOLTAGE: 2.99 V
MDC_IDC_MSMT_LEADCHNL_RA_IMPEDANCE_VALUE: 323 OHM
MDC_IDC_MSMT_LEADCHNL_RA_IMPEDANCE_VALUE: 456 OHM
MDC_IDC_MSMT_LEADCHNL_RA_PACING_THRESHOLD_AMPLITUDE: 1 V
MDC_IDC_MSMT_LEADCHNL_RA_PACING_THRESHOLD_PULSEWIDTH: 0.4 MS
MDC_IDC_MSMT_LEADCHNL_RA_SENSING_INTR_AMPL: 0.75 MV
MDC_IDC_MSMT_LEADCHNL_RA_SENSING_INTR_AMPL: 0.75 MV
MDC_IDC_MSMT_LEADCHNL_RV_IMPEDANCE_VALUE: 361 OHM
MDC_IDC_MSMT_LEADCHNL_RV_IMPEDANCE_VALUE: 418 OHM
MDC_IDC_MSMT_LEADCHNL_RV_PACING_THRESHOLD_AMPLITUDE: 0.62 V
MDC_IDC_MSMT_LEADCHNL_RV_PACING_THRESHOLD_PULSEWIDTH: 0.4 MS
MDC_IDC_MSMT_LEADCHNL_RV_SENSING_INTR_AMPL: 18.38 MV
MDC_IDC_MSMT_LEADCHNL_RV_SENSING_INTR_AMPL: 18.38 MV
MDC_IDC_PG_IMPLANT_DTM: NORMAL
MDC_IDC_PG_MFG: NORMAL
MDC_IDC_PG_MODEL: NORMAL
MDC_IDC_PG_SERIAL: NORMAL
MDC_IDC_PG_TYPE: NORMAL
MDC_IDC_SESS_CLINIC_NAME: NORMAL
MDC_IDC_SESS_DTM: NORMAL
MDC_IDC_SESS_TYPE: NORMAL
MDC_IDC_SET_BRADY_HYSTRATE: NORMAL
MDC_IDC_SET_BRADY_LOWRATE: 80 {BEATS}/MIN
MDC_IDC_SET_BRADY_MAX_SENSOR_RATE: 130 {BEATS}/MIN
MDC_IDC_SET_BRADY_MODE: NORMAL
MDC_IDC_SET_LEADCHNL_RA_SENSING_ANODE_ELECTRODE_1: NORMAL
MDC_IDC_SET_LEADCHNL_RA_SENSING_ANODE_LOCATION_1: NORMAL
MDC_IDC_SET_LEADCHNL_RA_SENSING_CATHODE_ELECTRODE_1: NORMAL
MDC_IDC_SET_LEADCHNL_RA_SENSING_CATHODE_LOCATION_1: NORMAL
MDC_IDC_SET_LEADCHNL_RA_SENSING_POLARITY: NORMAL
MDC_IDC_SET_LEADCHNL_RA_SENSING_SENSITIVITY: 0.3 MV
MDC_IDC_SET_LEADCHNL_RV_PACING_AMPLITUDE: 1.5 V
MDC_IDC_SET_LEADCHNL_RV_PACING_ANODE_ELECTRODE_1: NORMAL
MDC_IDC_SET_LEADCHNL_RV_PACING_ANODE_LOCATION_1: NORMAL
MDC_IDC_SET_LEADCHNL_RV_PACING_CAPTURE_MODE: NORMAL
MDC_IDC_SET_LEADCHNL_RV_PACING_CATHODE_ELECTRODE_1: NORMAL
MDC_IDC_SET_LEADCHNL_RV_PACING_CATHODE_LOCATION_1: NORMAL
MDC_IDC_SET_LEADCHNL_RV_PACING_POLARITY: NORMAL
MDC_IDC_SET_LEADCHNL_RV_PACING_PULSEWIDTH: 0.4 MS
MDC_IDC_SET_LEADCHNL_RV_SENSING_ANODE_ELECTRODE_1: NORMAL
MDC_IDC_SET_LEADCHNL_RV_SENSING_ANODE_LOCATION_1: NORMAL
MDC_IDC_SET_LEADCHNL_RV_SENSING_CATHODE_ELECTRODE_1: NORMAL
MDC_IDC_SET_LEADCHNL_RV_SENSING_CATHODE_LOCATION_1: NORMAL
MDC_IDC_SET_LEADCHNL_RV_SENSING_POLARITY: NORMAL
MDC_IDC_SET_LEADCHNL_RV_SENSING_SENSITIVITY: 0.9 MV
MDC_IDC_SET_ZONE_DETECTION_INTERVAL: 350 MS
MDC_IDC_SET_ZONE_DETECTION_INTERVAL: 400 MS
MDC_IDC_SET_ZONE_TYPE: NORMAL
MDC_IDC_STAT_AT_BURDEN_PERCENT: 100 %
MDC_IDC_STAT_AT_DTM_END: NORMAL
MDC_IDC_STAT_AT_DTM_START: NORMAL
MDC_IDC_STAT_BRADY_AP_VP_PERCENT: 0 %
MDC_IDC_STAT_BRADY_AP_VS_PERCENT: 0 %
MDC_IDC_STAT_BRADY_AS_VP_PERCENT: 99.41 %
MDC_IDC_STAT_BRADY_AS_VS_PERCENT: 0.59 %
MDC_IDC_STAT_BRADY_DTM_END: NORMAL
MDC_IDC_STAT_BRADY_DTM_START: NORMAL
MDC_IDC_STAT_BRADY_RA_PERCENT_PACED: 0 %
MDC_IDC_STAT_BRADY_RV_PERCENT_PACED: 99.51 %
MDC_IDC_STAT_EPISODE_RECENT_COUNT: 0
MDC_IDC_STAT_EPISODE_RECENT_COUNT_DTM_END: NORMAL
MDC_IDC_STAT_EPISODE_RECENT_COUNT_DTM_START: NORMAL
MDC_IDC_STAT_EPISODE_TOTAL_COUNT: 0
MDC_IDC_STAT_EPISODE_TOTAL_COUNT: 0
MDC_IDC_STAT_EPISODE_TOTAL_COUNT: 4
MDC_IDC_STAT_EPISODE_TOTAL_COUNT: 601
MDC_IDC_STAT_EPISODE_TOTAL_COUNT_DTM_END: NORMAL
MDC_IDC_STAT_EPISODE_TOTAL_COUNT_DTM_START: NORMAL
MDC_IDC_STAT_EPISODE_TYPE: NORMAL

## 2020-05-21 NOTE — NURSING NOTE
Diet: Patient instructed regarding a heart healthy diet, including discussion of reduced fat and sodium intake. Patient demonstrated understanding of this information and agreed to call with further questions or concerns.  Labs: Patient was given results of the laboratory testing obtained today. Patient demonstrated understanding of this information and agreed to call with further questions or concerns.   Return Appointment: Patient given instructions regarding scheduling next clinic visit. Patient demonstrated understanding of this information and agreed to call with further questions or concerns.  Patient stated she understood all health information given and agreed to call with further questions or concerns.   Lizett Zapata RN    Update History & Physical    The Patient's History and Physical of April , 29 2020   was reviewed with the patient and I examined the patient. There was no change. The surgical site was confirmed by the patient and me. Plan:  The risk, benefits, expected outcome, and alternative to the recommended procedure have been discussed with the patient. Patient understands and wants to proceed with the procedure.     Electronically signed by Roberto Arrington MD on 5/21/2020 at 9:07 AM

## 2020-05-27 ENCOUNTER — MEDICAL CORRESPONDENCE (OUTPATIENT)
Dept: HEALTH INFORMATION MANAGEMENT | Facility: CLINIC | Age: 85
End: 2020-05-27

## 2020-06-10 ENCOUNTER — MEDICAL CORRESPONDENCE (OUTPATIENT)
Dept: HEALTH INFORMATION MANAGEMENT | Facility: CLINIC | Age: 85
End: 2020-06-10

## 2020-06-20 ENCOUNTER — MEDICAL CORRESPONDENCE (OUTPATIENT)
Dept: HEALTH INFORMATION MANAGEMENT | Facility: CLINIC | Age: 85
End: 2020-06-20

## 2020-06-23 NOTE — PROGRESS NOTES
SUBJECTIVE:   Linda Spicer is a 86 year old female who presents to clinic today for the following health issues:    Joint Pain    Onset: 1 month    Description:   Location: right foot toe  Character: Sharp and Burning    Intensity: moderate, severe    Progression of Symptoms: same    Accompanying Signs & Symptoms:  Other symptoms: redness    History:   Previous similar pain: YES, toe dislocated and joint removal after undergoing bunionectomy.    Precipitating factors:   Trauma or overuse: no     Alleviating factors:  Improved by: none  Therapies Tried and outcome: none (cannot take NSAIDs due to Xarelto).          Chronic Kidney Disease Follow-up      Current NSAID use?  No    Diabetes: No    Hypertension: Yes    Nephrolithiasis: No    Glomerulonephritis: None since no proteinuria      Problem list and histories reviewed & adjusted, as indicated.  Additional history: as documented    Patient Active Problem List   Diagnosis     ACP (advance care planning)     Hypertension goal BP (blood pressure) < 150/90     Hypothyroid     Diffuse idiopathic pulmonary fibrosis (H)     Macular degeneration, left eye     Irritable bowel syndrome     Encounter for palliative care     Adjustment disorder with anxious mood     Mild anemia     DDD (degenerative disc disease), lumbar     CKD (chronic kidney disease) stage 3, GFR 30-59 ml/min     History of blood transfusion     Aftercare following surgery     S/P lumbar laminectomy     High risk medication use     Osteopenia     Atrophic vaginitis     Fecal incontinence     Female stress incontinence     Impingement syndrome of both shoulders     UIP (usual interstitial pneumonitis) (H)     High risk medications (not anticoagulants) long-term use     Heart failure with preserved ejection fraction (H)     Congestive heart failure with preserved LV function, NYHA class 3 (H)     Other specified hypothyroidism     Rheumatoid arthritis involving multiple sites with positive rheumatoid  factor (H)     Health Care Home     Sick sinus syndrome (H)     Cardiac pacemaker - Medtronic dual lead pacemaker - Not Dependent - MRI Safe     Past Surgical History:   Procedure Laterality Date     APPENDECTOMY       BIOPSY      hemorrhoidectomy     ENT SURGERY      tonsillectomy     GYN SURGERY      3 D & C's     HYSTERECTOMY, PAP NO LONGER INDICATED       LAMINECTOMY LUMBAR ONE LEVEL N/A 10/13/2015    Procedure: LAMINECTOMY LUMBAR ONE LEVEL;  Surgeon: Fransico Toussaint MD;  Location:  OR       Social History   Substance Use Topics     Smoking status: Never Smoker     Smokeless tobacco: Never Used     Alcohol use Yes      Comment: rare wine      Family History   Problem Relation Age of Onset     Hypertension Mother      Psychotic Disorder Father      DIABETES Son      DIABETES Daughter      Blood Disease Daughter          Allergies   Allergen Reactions     Cephalexin Hcl Diarrhea     Gabapentin Other (See Comments)     Dizzsiness     Naproxen GI Disturbance     Perfume      Lactase Other (See Comments)     Macrobid [Nitrofurantoin Anhydrous]      Possibly related to lung disease      Sulfa Drugs      Throat swelling     Xanax [Alprazolam] Other (See Comments)     Dizziness      Recent Labs   Lab Test  11/01/17   0831  08/30/17   1214  08/18/17   0934   07/21/17   0859   03/24/17   1401  02/16/17   0949   06/03/16   0756   05/22/14   0821   LDL   --   76   --    --    --    --    --    --    --   109*   --   97   HDL   --   50   --    --    --    --    --    --    --   47*   --   42*   TRIG   --   101   --    --    --    --    --    --    --   75   --   84   ALT  33   --   31   --   31   < >  36  21   < >  36   < >   --    CR  1.41*   --   1.18*   < >  1.24*   < >  1.31*  1.24*   < >  1.44*   < >  1.21*   GFRESTIMATED  35*   --   43*   < >  41*   < >  39*  41*   < >  35*   < >  43*   GFRESTBLACK  43*   --   53*   < >  50*   < >  47*  50*   < >  42*   < >  52*   POTASSIUM  4.3   --    --    --    --    --    "3.7  4.7   < >  5.1   < >  4.1   TSH   --   0.77   --    --    --    --    --   0.93   < >   --    < >  0.58    < > = values in this interval not displayed.      BP Readings from Last 3 Encounters:   11/01/17 136/59   10/13/17 131/64   09/16/17 128/69    Wt Readings from Last 3 Encounters:   11/01/17 158 lb (71.7 kg)   10/13/17 159 lb 14.4 oz (72.5 kg)   09/16/17 158 lb (71.7 kg)                    ROS:  C: NEGATIVE for fever, chills, change in weight  I: NEGATIVE for worrisome rashes, moles or lesions  E: NEGATIVE for vision changes or irritation  E/M: NEGATIVE for ear, mouth and throat problems  R: NEGATIVE for significant cough or SOB  CV: POSITIVE for paroxysmal atrial fibrillation, currently anticoagulated with Xarelto.  GI: POSITIVE for chronic diarrhea that resolved within 5 days of discontinuing proton pump inhibitors.   female: POSITIVE for chronic UTI, currently taking Ciprofloxacin for prophlaxis.  M: NEGATIVE for significant arthralgias or myalgia  N: NEGATIVE for weakness, dizziness or paresthesias  E: NEGATIVE for temperature intolerance, skin/hair changes  H: NEGATIVE for bleeding problems  P: NEGATIVE for changes in mood or affect    OBJECTIVE:     /59 (BP Location: Left arm, Patient Position: Chair, Cuff Size: Adult Regular)  Pulse 66  Temp 98.1  F (36.7  C) (Oral)  Ht 5' 3\" (1.6 m)  Wt 158 lb (71.7 kg)  SpO2 98%  BMI 27.99 kg/m2  Body mass index is 27.99 kg/(m^2).  GENERAL: healthy, alert and no distress  EYES: Eyes grossly normal to inspection and conjunctivae and sclerae normal  RESP: lungs clear to auscultation - no rales, rhonchi or wheezes  CV: regular rate and rhythm, normal S1 S2, no S3 or S4, no murmur, click or rub, no peripheral edema and peripheral pulses strong  ABDOMEN: soft, nontender, no hepatosplenomegaly, no masses and bowel sounds normal  MS: Tenderness on palpation of right little toe without any synovitis.  SKIN: no suspicious lesions or rashes  NEURO: Normal " strength and tone, mentation intact and speech normal  PSYCH: mentation appears normal, affect normal/bright    Diagnostic Test Results:  Results for orders placed or performed in visit on 11/01/17 (from the past 24 hour(s))   Comprehensive metabolic panel (BMP + Alb, Alk Phos, ALT, AST, Total. Bili, TP)   Result Value Ref Range    Sodium 139 133 - 144 mmol/L    Potassium 4.3 3.4 - 5.3 mmol/L    Chloride 105 94 - 109 mmol/L    Carbon Dioxide 31 20 - 32 mmol/L    Anion Gap 3 3 - 14 mmol/L    Glucose 97 70 - 99 mg/dL    Urea Nitrogen 42 (H) 7 - 30 mg/dL    Creatinine 1.41 (H) 0.52 - 1.04 mg/dL    GFR Estimate 35 (L) >60 mL/min/1.7m2    GFR Estimate If Black 43 (L) >60 mL/min/1.7m2    Calcium 9.8 8.5 - 10.1 mg/dL    Bilirubin Total 0.5 0.2 - 1.3 mg/dL    Albumin 3.6 3.4 - 5.0 g/dL    Protein Total 7.4 6.8 - 8.8 g/dL    Alkaline Phosphatase 65 40 - 150 U/L    ALT 33 0 - 50 U/L    AST 25 0 - 45 U/L   XR Toe Right G/E 2 Views    Narrative    XR TOE RIGHT G/E 2 VIEWS 11/1/2017 8:38 AM    HISTORY: Right toe pain.    COMPARISON: 5/15/2013    FINDINGS: Stable changes to the distal fifth metatarsal. No acute  fracture or malalignment. The appearance of the lateral right forefoot  is not significantly changed from 5/15/2013.      Impression    IMPRESSION: No acute abnormality.    KALEN ORDONEZ MD       ASSESSMENT/PLAN:         (M79.674) Pain of toe of right foot  (primary encounter diagnosis)  Comment: No evidence of fractures, findings are typical after previous bunionectomy.. Avoid NSAIDs, but use SP boot for off-loading in an effort to reduce pain.  Plan: XR Toe Right G/E 2 Views, PODIATRY/FOOT & ANKLE        SURGERY REFERRAL, order for DME            (Z98.890) Status post bunionectomy  Comment: As above.  Plan: PODIATRY/FOOT & ANKLE SURGERY REFERRAL, order         for DME            (N18.3) Chronic kidney disease, stage 3 (moderate)  Comment: Stable renal function. Since GFR is above 30, continue Xarelto at the same  dose.  Plan: Comprehensive metabolic panel (BMP + Alb, Alk         Phos, ALT, AST, Total. Bili, TP)            Follow-up visit if condition worsens.      Tre Lockett MD  [facility]   negative...

## 2020-06-24 ENCOUNTER — TRANSFERRED RECORDS (OUTPATIENT)
Dept: HEALTH INFORMATION MANAGEMENT | Facility: CLINIC | Age: 85
End: 2020-06-24

## 2020-07-08 ENCOUNTER — MEDICAL CORRESPONDENCE (OUTPATIENT)
Dept: HEALTH INFORMATION MANAGEMENT | Facility: CLINIC | Age: 85
End: 2020-07-08

## 2020-07-16 DIAGNOSIS — E03.9 HYPOTHYROIDISM, UNSPECIFIED TYPE: ICD-10-CM

## 2020-07-20 RX ORDER — LEVOTHYROXINE SODIUM 75 UG/1
TABLET ORAL
Qty: 90 TABLET | Refills: 0
Start: 2020-07-20

## 2020-07-20 NOTE — TELEPHONE ENCOUNTER
90 day supply sent on 6/26/20. Should have enough until September 2020. Too soon to refill.     Carmenza Miller RN, BSN, PHN

## 2020-08-20 DIAGNOSIS — N95.2 ATROPHIC VAGINITIS: ICD-10-CM

## 2020-08-20 NOTE — TELEPHONE ENCOUNTER
Medication request: COMPOUNDED NON-CONTROLLED SUBSTANCE Estriol 1mg/gram through [facility]  Sig: Place 1 gram vaginally twice weekly  Prescription written: 4/4/19  Last Qty: 30g  Pt's last office visit: 9/19/19 with Dr. Martins  Next scheduled office visit: none    Called and spoke to patient who is aware that we have been filling this medication through the [facility] pharmacy. Patient verbalized understanding and reports that she would like the prescription sent to the [facility] pharmacy. Patient aware that she will be getting a call regarding the prescription. Informed patient that she will be due soon for a follow up visit and we are offering telephone or video visits. Per patient, she is on hospice and is getting increased help for the time being. Informed patient that we can discuss a follow up visit at a later time. Patient was comfortable with the plan and grateful for the call.    Sena Chapman RN, BSN

## 2020-08-24 ENCOUNTER — VIRTUAL VISIT (OUTPATIENT)
Dept: ENDOCRINOLOGY | Facility: CLINIC | Age: 85
End: 2020-08-24
Payer: MEDICARE

## 2020-08-24 DIAGNOSIS — M81.0 AGE-RELATED OSTEOPOROSIS WITHOUT CURRENT PATHOLOGICAL FRACTURE: Primary | ICD-10-CM

## 2020-08-24 PROCEDURE — 99441 ZZC PHYSICIAN TELEPHONE EVALUATION 5-10 MIN: CPT | Mod: 95 | Performed by: INTERNAL MEDICINE

## 2020-08-24 NOTE — LETTER
8/24/2020         RE: Linda Spcier  [address]  [place] [address]        Dear Colleague,    Thank you for referring your patient, Linda Spicer, to the [facility]. Please see a copy of my visit note below.         Endocrinology Note         Linda is a 89 year old female who has telephone visit today for osteoporosis.    HPI  Linda Spicer is a 89 year old female with hx of rheumatoid arthritis, RA associated pulmonary fibrosis, Afib s/p pacemaker, CKD state 3, CHF presents today for osteoporosis. Last visit 4/2018.    Linda has had RA since 1973 and was on chronic prednisone for 40 years before stopping it around 9526-7792. She is currently on immunomodulator. She has diagnosis of osteoporosis for long time as well. The earliest DXA I reviewed was in 2011 when T-score was -2.7 at lumbar spine. She was on either Fosamax or Boniva for a long time at least Fosamax from 3332-4285 and Boniva from 2584-5549. She stated that she was even on something before that. She was tried on Evista last year but could not tolerate due to side effects. She has no side effects with oral bisphosphonates.    DXA in 6/2017 revealed T-score was -3.1 at lumbar spine with significant bone loss in lumbar spine and hips.     She reports history of left humeral fracture when she fell. She did have right toe fracture when she stepped against something a long time ago. She increased calcium+vitamin D to 1 pill tid for several months. She has lactose intolerance and could not tolerate milk or other dairy product very well.     Interval history  She received Prolia 5/2018,11/2018,6/2019 and 3/2020.  She is currently under hospice care now since Feb 2020 due to her heart and lung condition. She lives in her own apartment with nurse coming to see her about 2-4 hours per week. She gets tired very easily. She does not walk much. She usually walks using walker inside her apartment.     I talked to her  daughter, Toya who said that she is not sure how long her mother will live. She is interested in continuing Prolia if it can be done by the home nurse.    Past Medical History  Past Medical History:   Diagnosis Date     Adjustment disorder with anxious mood 5/18/2015     Advanced directives, counseling/discussion 8/30/2012    Patient states has Advance Directive and will bring in a copy to clinic. 8/30/2012   Tevin [facility] Medical Assistant \       Anemia 9/25/2015     Basal cell cancer      CHF (congestive heart failure) (H) 9/18/2014     CKD (chronic kidney disease) stage 3, GFR 30-59 ml/min (H) 9/29/2015     DDD (degenerative disc disease), lumbar 9/25/2015     Diffuse idiopathic pulmonary fibrosis (H) 5/6/2013     Encounter for palliative care 5/18/2015     History of blood transfusion 9/29/2015     Hypertension goal BP (blood pressure) < 140/90 9/7/2012     Hypothyroid 9/7/2012     Irritable bowel syndrome 10/29/2013     Macular degeneration      Macular degeneration, 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     Nondisplaced spiral fracture of shaft of humerus      Osteoporosis 8/13/2013     Imo Update utility     RA (rheumatoid arthritis) (H) 5/7/2013     Rheumatic fever      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 Tolerates ibuprofen.       Perfume      Sulfa Drugs Shortness Of Breath     Throat swelling     Alprazolam Other (See Comments)     Dizziness      Levofloxacin GI Disturbance     Macrobid  [Nitrofurantoin Anhydrous]      Possibly related to lung disease      Seasonal Allergies      Ciprofloxacin Itching and Rash     Medications  Current Outpatient Medications   Medication Sig Dispense Refill     acetaminophen (TYLENOL) 325 MG tablet Take 2 tablets (650 mg) by mouth every 4 hours as needed for mild pain (Patient not taking: Reported on 5/5/2020) 30 tablet 0     apixaban ANTICOAGULANT (ELIQUIS ANTICOAGULANT) 2.5 MG tablet Take 1 tablet (2.5 mg) by mouth 2 times daily 180 tablet 3     aspirin 81 MG EC tablet Take 81 mg by mouth daily       azaTHIOprine (IMURAN) 50 MG tablet Take 1 tablet (50 mg) by mouth daily 90 tablet 2     carvedilol (COREG) 3.125 MG tablet Take 1 tablet (3.125 mg) by mouth 2 times daily (with meals) 180 tablet 1     COMPOUNDED NON-CONTROLLED SUBSTANCE (CMPD RX) - PHARMACY TO MIX COMPOUNDED MEDICATION Estriol 0.1% cream (1mg/gram) in HRT base. Place 1 gram vaginally daily for 2 weeks, then vaginally twice weekly. 30 g 1     COMPOUNDED NON-CONTROLLED SUBSTANCE (CMPD RX) - PHARMACY TO MIX COMPOUNDED MEDICATION Estriol 1mg/gram. Place 1 gram vaginally daily for 2 weeks. Then vaginally twice weekly (Patient taking differently: Place 1 g vaginally every 48 hours Estriol 1mg/gram cream) 30 g 6     fish oil-omega-3 fatty acids 1000 MG capsule Take 1 g by mouth 2 times daily       furosemide (LASIX) 20 MG tablet Take 2 tablets (40 mg) by mouth daily 7 tablet 0     hydrALAZINE (APRESOLINE) 10 MG tablet Take 2 tablets (20 mg) by mouth 3 times daily 180 tablet 3     HYDROmorphone (DILAUDID) 2 MG tablet Take 0.5 tablets (1 mg) by mouth every 6 hours as needed for pain (air hunger) 12 tablet 0     isosorbide mononitrate (IMDUR) 60 MG 24 hr tablet Take 1 tablet (60 mg) by mouth daily 30 tablet 0     levothyroxine (SYNTHROID/LEVOTHROID) 75 MCG tablet TAKE 1 TABLET BY MOUTH EVERY DAY 90 tablet 0     Multiple Vitamins-Minerals (PRESERVISION AREDS) CAPS Take 1 capsule by mouth 2 times daily 30 capsule  1     nitroGLYcerin (NITROSTAT) 0.4 MG sublingual tablet Place 1 tablet (0.4 mg) under the tongue every 5 minutes as needed for chest pain 25 tablet 11     order for DME Dispense SP Walker-small 1 each 0     order for DME Equipment being ordered: Dispense baffle, for use with nebulizer. 1 each 0     order for DME Equipment being ordered: Nebulizer. Use with Albuterol. 1 each 0     order for DME Equipment being ordered: Dispense face mask.  Mrs. Spicer is immunosuppressed due to rheumatoid arthritis. 1 Box 11     pantoprazole (PROTONIX) 40 MG EC tablet Take 1 tablet (40 mg) by mouth 2 times daily 14 tablet 0     potassium chloride (KAYCIEL) 20 MEQ/15ML (10%) solution Take 15 mLs (20 mEq) by mouth 2 times daily 2700 mL 1     senna-docusate (SENOKOT-S/PERICOLACE) 8.6-50 MG tablet Take 1 tablet by mouth daily as needed for constipation 100 tablet 3     trimethoprim (TRIMPEX) 100 MG tablet Take 0.5 tablets (50 mg) by mouth daily 45 tablet 3     Family History  family history includes Blood Disease in her daughter; Diabetes in her daughter and son; Hypertension in her mother; Psychotic Disorder in her father.  Social History  Social History     Tobacco Use     Smoking status: Never Smoker     Smokeless tobacco: Never Used   Substance Use Topics     Alcohol use: Yes     Comment: rare wine      Drug use: No       ROS  Low energy, currently in hospice care  + dyspnea, + shortness of breath due to pulmonary fibrosis    Physical Exam  Limited due to phone visit  Talk slow, get short of breath during conversation      RESULTS  DXA 7/26/2011  /      DXA 2/22/2013      DXA 5/20/2013 [facility]  Conclusions:      a. Based on the most negative and valid T -score of - 3.2 at the   level of the L1 - L2 lumbar spine, this patient has osteoporosis.     DXA 6/29/2015  Lumbar spine T-score in region of L1-L4 = -2.2      HIPS:  Mean total hip T-score: -2.4     Left femoral neck T-score = -2.2  Right femoral neck T-score= 1.6       Radius 33% T-score = NA    DXA 6/23/2017  Lumbar spine T-score in region of L2, excluding L3 and L4 = -3.1   L1-4 percent change: -10.7%      HIPS:  Mean total hip T-score: -2.7  Mean total hip percent change:-4.1%      Left femoral neck T-score = -2.7  Right femoral neck T-score= -2.3      COMPARISON TO PRIOR:  Percent change in the spine and hips is significant accounting to the precision errors for this facility.       IMPRESSION:  Osteoporosis    ASSESSMENT:    Linda Spicer is a 89 year old female with hx of rheumatoid arthritis, RA associated pulmonary fibrosis, Afib s/p pacemaker, CKD state 3, CHF who has telephone visit today for osteoporosis.    1) Osteoporosis: long standing secondary to RA, previous chronic use of steroid, thin body habitus and family hx of osteoporosis. She has been on long term use of oral bisphosphonates. Stopped for 8 months. DXA in 6/2017 showed osteoporosis with worsening BMD at lumbar spine and hips, she received Prolia injection 5/2018,11/2018,6/2019 and 3/2020.  - the next dose is due in 9/2020.  - if Prolia can be done by home nurse, we will continue on it. If not, I will stop. Her daughter will check with hospice nurse  - continue with fall precaution     PLAN:   Discussed with her that if Prolia can be done by home nurse, we will continue on it.  No need for DXA any longer due to hospice care    Phone start time: 0145 PM  Phone end time: 0154 PM  Phone duration: 9 minutes    Tomi Peña MD     Division of Diabetes and Endocrinology  Department of Medicine  [phone]

## 2020-08-24 NOTE — PROGRESS NOTES
"Linda Spicer is a 89 year old female who is being evaluated via a billable telephone visit.      The patient has been notified of following:     \"This telephone visit will be conducted via a call between you and your physician/provider. We have found that certain health care needs can be provided without the need for a physical exam.  This service lets us provide the care you need with a short phone conversation.  If a prescription is necessary we can send it directly to your pharmacy.  If lab work is needed we can place an order for that and you can then stop by our lab to have the test done at a later time.    Telephone visits are billed at different rates depending on your insurance coverage. During this emergency period, for some insurers they may be billed the same as an in-person visit.  Please reach out to your insurance provider with any questions.    If during the course of the call the physician/provider feels a telephone visit is not appropriate, you will not be charged for this service.\"    Patient has given verbal consent for Telephone visit?  Yes    What phone number would you like to be contacted at? [phone]    How would you like to obtain your AVS? Janeth Stewart, [facility] [address]  Adult Endocrinology  [facility]    "

## 2020-08-24 NOTE — PROGRESS NOTES
Endocrinology Note         Linda is a 89 year old female who has telephone visit today for osteoporosis.    HPI  Linda Spicer is a 89 year old female with hx of rheumatoid arthritis, RA associated pulmonary fibrosis, Afib s/p pacemaker, CKD state 3, CHF presents today for osteoporosis. Last visit 4/2018.    Linda has had RA since 1973 and was on chronic prednisone for 40 years before stopping it around 5395-1825. She is currently on immunomodulator. She has diagnosis of osteoporosis for long time as well. The earliest DXA I reviewed was in 2011 when T-score was -2.7 at lumbar spine. She was on either Fosamax or Boniva for a long time at least Fosamax from 9061-3964 and Boniva from 3052-7213. She stated that she was even on something before that. She was tried on Evista last year but could not tolerate due to side effects. She has no side effects with oral bisphosphonates.    DXA in 6/2017 revealed T-score was -3.1 at lumbar spine with significant bone loss in lumbar spine and hips.     She reports history of left humeral fracture when she fell. She did have right toe fracture when she stepped against something a long time ago. She increased calcium+vitamin D to 1 pill tid for several months. She has lactose intolerance and could not tolerate milk or other dairy product very well.     Interval history  She received Prolia 5/2018,11/2018,6/2019 and 3/2020.  She is currently under hospice care now since Feb 2020 due to her heart and lung condition. She lives in her own apartment with nurse coming to see her about 2-4 hours per week. She gets tired very easily. She does not walk much. She usually walks using walker inside her apartment.     I talked to her daughter, Toya who said that she is not sure how long her mother will live. She is interested in continuing Prolia if it can be done by the home nurse.    Past Medical History  Past Medical History:   Diagnosis Date     Adjustment disorder with anxious mood  5/18/2015     Advanced directives, counseling/discussion 8/30/2012    Patient states has Advance Directive and will bring in a copy to clinic. 8/30/2012   Tevin May  [facility] Medical Assistant \       Anemia 9/25/2015     Basal cell cancer      CHF (congestive heart failure) (H) 9/18/2014     CKD (chronic kidney disease) stage 3, GFR 30-59 ml/min (H) 9/29/2015     DDD (degenerative disc disease), lumbar 9/25/2015     Diffuse idiopathic pulmonary fibrosis (H) 5/6/2013     Encounter for palliative care 5/18/2015     History of blood transfusion 9/29/2015     Hypertension goal BP (blood pressure) < 140/90 9/7/2012     Hypothyroid 9/7/2012     Irritable bowel syndrome 10/29/2013     Macular degeneration      Macular degeneration, 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     Nondisplaced spiral fracture of shaft of humerus      Osteoporosis 8/13/2013     Imo Update utility     RA (rheumatoid arthritis) (H) 5/7/2013     Rheumatic fever      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 Tolerates ibuprofen.       Perfume      Sulfa Drugs Shortness Of Breath     Throat swelling     Alprazolam Other (See Comments)     Dizziness      Levofloxacin GI Disturbance     Macrobid [Nitrofurantoin Anhydrous]      Possibly related to lung disease      Seasonal Allergies      Ciprofloxacin Itching and Rash     Medications  Current Outpatient Medications   Medication Sig Dispense Refill     acetaminophen (TYLENOL) 325 MG tablet Take 2 tablets (650  mg) by mouth every 4 hours as needed for mild pain (Patient not taking: Reported on 5/5/2020) 30 tablet 0     apixaban ANTICOAGULANT (ELIQUIS ANTICOAGULANT) 2.5 MG tablet Take 1 tablet (2.5 mg) by mouth 2 times daily 180 tablet 3     aspirin 81 MG EC tablet Take 81 mg by mouth daily       azaTHIOprine (IMURAN) 50 MG tablet Take 1 tablet (50 mg) by mouth daily 90 tablet 2     carvedilol (COREG) 3.125 MG tablet Take 1 tablet (3.125 mg) by mouth 2 times daily (with meals) 180 tablet 1     COMPOUNDED NON-CONTROLLED SUBSTANCE (CMPD RX) - PHARMACY TO MIX COMPOUNDED MEDICATION Estriol 0.1% cream (1mg/gram) in HRT base. Place 1 gram vaginally daily for 2 weeks, then vaginally twice weekly. 30 g 1     COMPOUNDED NON-CONTROLLED SUBSTANCE (CMPD RX) - PHARMACY TO MIX COMPOUNDED MEDICATION Estriol 1mg/gram. Place 1 gram vaginally daily for 2 weeks. Then vaginally twice weekly (Patient taking differently: Place 1 g vaginally every 48 hours Estriol 1mg/gram cream) 30 g 6     fish oil-omega-3 fatty acids 1000 MG capsule Take 1 g by mouth 2 times daily       furosemide (LASIX) 20 MG tablet Take 2 tablets (40 mg) by mouth daily 7 tablet 0     hydrALAZINE (APRESOLINE) 10 MG tablet Take 2 tablets (20 mg) by mouth 3 times daily 180 tablet 3     HYDROmorphone (DILAUDID) 2 MG tablet Take 0.5 tablets (1 mg) by mouth every 6 hours as needed for pain (air hunger) 12 tablet 0     isosorbide mononitrate (IMDUR) 60 MG 24 hr tablet Take 1 tablet (60 mg) by mouth daily 30 tablet 0     levothyroxine (SYNTHROID/LEVOTHROID) 75 MCG tablet TAKE 1 TABLET BY MOUTH EVERY DAY 90 tablet 0     Multiple Vitamins-Minerals (PRESERVISION AREDS) CAPS Take 1 capsule by mouth 2 times daily 30 capsule 1     nitroGLYcerin (NITROSTAT) 0.4 MG sublingual tablet Place 1 tablet (0.4 mg) under the tongue every 5 minutes as needed for chest pain 25 tablet 11     order for DME Dispense SP Walker-small 1 each 0     order for DME Equipment being ordered: Dispense wei, for  use with nebulizer. 1 each 0     order for DME Equipment being ordered: Nebulizer. Use with Albuterol. 1 each 0     order for DME Equipment being ordered: Dispense face mask.  Mrs. Spicer is immunosuppressed due to rheumatoid arthritis. 1 Box 11     pantoprazole (PROTONIX) 40 MG EC tablet Take 1 tablet (40 mg) by mouth 2 times daily 14 tablet 0     potassium chloride (KAYCIEL) 20 MEQ/15ML (10%) solution Take 15 mLs (20 mEq) by mouth 2 times daily 2700 mL 1     senna-docusate (SENOKOT-S/PERICOLACE) 8.6-50 MG tablet Take 1 tablet by mouth daily as needed for constipation 100 tablet 3     trimethoprim (TRIMPEX) 100 MG tablet Take 0.5 tablets (50 mg) by mouth daily 45 tablet 3     Family History  family history includes Blood Disease in her daughter; Diabetes in her daughter and son; Hypertension in her mother; Psychotic Disorder in her father.  Social History  Social History     Tobacco Use     Smoking status: Never Smoker     Smokeless tobacco: Never Used   Substance Use Topics     Alcohol use: Yes     Comment: rare wine      Drug use: No       ROS  Low energy, currently in hospice care  + dyspnea, + shortness of breath due to pulmonary fibrosis    Physical Exam  Limited due to phone visit  Talk slow, get short of breath during conversation      RESULTS  DXA 7/26/2011  /      DXA 2/22/2013      DXA 5/20/2013 [facility]  Conclusions:      a. Based on the most negative and valid T -score of - 3.2 at the   level of the L1 - L2 lumbar spine, this patient has osteoporosis.     DXA 6/29/2015  Lumbar spine T-score in region of L1-L4 = -2.2      HIPS:  Mean total hip T-score: -2.4     Left femoral neck T-score = -2.2  Right femoral neck T-score= 1.6      Radius 33% T-score = NA    DXA 6/23/2017  Lumbar spine T-score in region of L2, excluding L3 and L4 = -3.1   L1-4 percent change: -10.7%      HIPS:  Mean total hip T-score: -2.7  Mean total hip percent change:-4.1%      Left femoral neck T-score = -2.7  Right femoral neck  T-score= -2.3      COMPARISON TO PRIOR:  Percent change in the spine and hips is significant accounting to the precision errors for this facility.       IMPRESSION:  Osteoporosis    ASSESSMENT:    Linda Spicer is a 89 year old female with hx of rheumatoid arthritis, RA associated pulmonary fibrosis, Afib s/p pacemaker, CKD state 3, CHF who has telephone visit today for osteoporosis.    1) Osteoporosis: long standing secondary to RA, previous chronic use of steroid, thin body habitus and family hx of osteoporosis. She has been on long term use of oral bisphosphonates. Stopped for 8 months. DXA in 6/2017 showed osteoporosis with worsening BMD at lumbar spine and hips, she received Prolia injection 5/2018,11/2018,6/2019 and 3/2020.  - the next dose is due in 9/2020.  - if Prolia can be done by home nurse, we will continue on it. If not, I will stop. Her daughter will check with hospice nurse  - continue with fall precaution     PLAN:   Discussed with her that if Prolia can be done by home nurse, we will continue on it.  No need for DXA any longer due to hospice care    Phone start time: 0145 PM  Phone end time: 0154 PM  Phone duration: 9 minutes    Tomi Peña MD     Division of Diabetes and Endocrinology  Department of Medicine  [phone]

## 2020-08-24 NOTE — PATIENT INSTRUCTIONS
- family will talk to hospice nurse regarding Prolia whether it can be given at home  - no need for bone density test    If you have any questions, please do not hesitate to call [facility] at [phone] and ask for Endocrinology clinic.    Sincerely,    Tomi Peña MD  Endocrinology

## 2020-09-02 ENCOUNTER — MEDICAL CORRESPONDENCE (OUTPATIENT)
Dept: HEALTH INFORMATION MANAGEMENT | Facility: CLINIC | Age: 85
End: 2020-09-02

## 2020-09-16 ENCOUNTER — MEDICAL CORRESPONDENCE (OUTPATIENT)
Dept: HEALTH INFORMATION MANAGEMENT | Facility: CLINIC | Age: 85
End: 2020-09-16

## 2020-09-30 ENCOUNTER — MEDICAL CORRESPONDENCE (OUTPATIENT)
Dept: HEALTH INFORMATION MANAGEMENT | Facility: CLINIC | Age: 85
End: 2020-09-30

## 2020-10-14 ENCOUNTER — MEDICAL CORRESPONDENCE (OUTPATIENT)
Dept: HEALTH INFORMATION MANAGEMENT | Facility: CLINIC | Age: 85
End: 2020-10-14

## 2020-11-04 ENCOUNTER — TELEPHONE (OUTPATIENT)
Dept: FAMILY MEDICINE | Facility: CLINIC | Age: 85
End: 2020-11-04

## 2020-11-12 ENCOUNTER — TELEPHONE (OUTPATIENT)
Dept: FAMILY MEDICINE | Facility: CLINIC | Age: 85
End: 2020-11-12

## 2020-11-12 NOTE — TELEPHONE ENCOUNTER
[facility] [employer] forms placed in Dr. Jonathan shetty for completion.    Deepa ALBERT, Patient Care

## 2020-11-18 NOTE — TELEPHONE ENCOUNTER
Reason for Call:  Other Forms    Detailed comments: [facility] calling to follow up on forms that were faxed on 11/04/20 but they have not received them and would like for Dr. Lockett to fax forms as soon as possible.      Phone Number RN  can be reached at: Other phone number:  [phone]    Best Time: anytime    Can we leave a detailed message on this number? YES    Call taken on 11/18/2020 at 11:29 AM by Siva Swanson

## 2020-12-08 NOTE — PROGRESS NOTES
Clinic Care Coordination Contact  Care Team Conversations    RN CC spoke with patient and informed her of Dr. Lockett's instructions below.  RN CC discussed appointment timeframe scheduling with Dayanara Luna triage RN who advised 12:00 pm time slot on 7/25/17.  Patient agreeable to appointment time.  RN CC will continue to follow patient on a monthly and as needed basis and will remain available to patient and care team as needed.    Melissa Behl BSN, RN, N  [facility] Care Coordinator  [phone]  [email or web address]        none

## 2021-01-15 ENCOUNTER — HEALTH MAINTENANCE LETTER (OUTPATIENT)
Age: 86
End: 2021-01-15

## 2021-04-28 NOTE — TELEPHONE ENCOUNTER
"Patient calling. States that she went to the hospital for a \"breathing problem\" about a month and a half ago. She was started on a new medication for her blood pressure which she does not recall the name and she is running low on it and wants to know if she should refill it. Does not feel it is helping because her BP is still high. Asked her when she checks it and she unfortunately  checks it before she is due for her morning medications. Explained that she needs to check her BP at least two hours after she takes her medications. She will start doing so today. Verified her medications, She did have a medication discrepancy with the metoprolol. She is currently taking Toprol 25 mg daily, not lopressor., This was changed in her chart.   Reviewed Care Everywhere. She was seen by Dr Butt at [facility] on 6/26/18 and started on propafenone for the PAF. They stated in the note that she would be following up with cardiology here in August. She was seen on 6/29/18 for a follow up on her Afib. She was also seen by PCP Dr Lockett on 7/12 for her ED visit on 7/2 for a fall.   She has about 12 days worth of the propafenone left. Will speak with Dr Loza and see if he wants to continue the medication, she will need a refill if so.   " no concerns

## 2021-06-02 VITALS — WEIGHT: 162 LBS | BODY MASS INDEX: 28.7 KG/M2 | HEIGHT: 63 IN

## 2021-06-02 VITALS — WEIGHT: 162.7 LBS | BODY MASS INDEX: 28.82 KG/M2

## 2021-06-18 NOTE — LETTER
Letter by Mehran Holley MD at      Author: Mehran Holley MD Service: -- Author Type: --    Filed:  Encounter Date: 1/24/2019 Status: (Other)         Patient: Linda Spicer   MR Number: [id number]   YOB: 1931   Date of Visit: 1/24/2019     [facility]      Facility:    [facility] [[id number]]  Code Status: DNR      Chief Complaint/Reason for Visit:  Chief Complaint   Patient presents with   ? H & P       HPI:   Linda is a 87 y.o. female who presented to the hospital with fatigue and shortness of breath.  This is been progressively worsening for 1 week prior to admission.  She was especially short of breath on exertion.  She could not even complete a sentence due to shortness of breath.  It was determined that the underlying problem was acute on chronic respiratory failure with acute on chronic diastolic congestive heart failure.  She also has underlying medical conditions of pulmonary fibrosis secondary to rheumatoid arthritis.  She does have paroxysmal atrial fibrillation with multiple cardioversions.  She has tachybradycardia syndrome status post permanent pacemaker.  She also has Chronic kidney disease, hypothyroidism, osteoporosis, and hypertension.    Upon current review of systems, she is not having fevers or chills.  She does not have sore throat.  She does not have cough for shortness of breath beyond her baseline.  She is not experiencing chest pain or palpitations of the heart.  She does not have abdominal pain or nausea.  She does not have dysuria.  She was recently diagnosed with a chalazion and is using warm packs to her eye for treatment and will have a follow-up in a couple of weeks.    Past Medical History:  Past Medical History:   Diagnosis Date   ? Anemia    ? CHF (congestive heart failure) (H)    ? CKD (chronic kidney disease) stage 3, GFR 30-59 ml/min (H)    ? DDD (degenerative disc disease), lumbar    ? Diffuse idiopathic pulmonary fibrosis  "(H)    ? HTN (hypertension)    ? Hypothyroid    ? IBS (irritable bowel syndrome)    ? Macular degeneration    ? Nondisplaced spiral fracture of shaft of humerus    ? Osteoporosis    ? RA (rheumatoid arthritis) (H)    ? Shingles    ? Spinal stenosis of lumbar region with neurogenic claudication    ? Tachycardia-bradycardia (H)            Surgical History:  Past Surgical History:   Procedure Laterality Date   ? CARDIAC PACEMAKER PLACEMENT  02/2017   ? CARDIOVERSION  09/14/2018    10/11/18, 10/16/18   ? DILATION AND CURETTAGE OF UTERUS      x3   ? HEMORROIDECTOMY     ? HYSTERECTOMY     ? LUMBAR LAMINECTOMY  10/13/2015    Procedure: LAMINECTOMY LUMBAR ONE LEVEL; Surgeon: Fransico Toussaint MD; Location: [facility] OR   ? TONSILLECTOMY         Family History:   Family History   Problem Relation Age of Onset   ? Hypertension Mother    ? Other Father         psychotic disorder    ? Diabetes Son    ? Diabetes Daughter        Social History:    Social History     Socioeconomic History   ? Marital status:      Spouse name: Not on file   ? Number of children: Not on file   ? Years of education: Not on file   ? Highest education level: Not on file   Social Needs   ? Financial resource strain: Not on file   ? Food insecurity - worry: Not on file   ? Food insecurity - inability: Not on file   ? Transportation needs - medical: Not on file   ? Transportation needs - non-medical: Not on file   Occupational History   ? Not on file   Tobacco Use   ? Smoking status: Never Smoker   ? Smokeless tobacco: Never Used   Substance and Sexual Activity   ? Alcohol use: Yes   ? Drug use: No   ? Sexual activity: No   Other Topics Concern   ? Not on file   Social History Narrative   ? Not on file          Review of Systems   All other systems reviewed and are negative.      Vitals:    01/23/19 1236   BP: 143/67   Pulse: 70   Resp: 19   Temp: 97.7  F (36.5  C)   SpO2: 96%   Weight: 162 lb (73.5 kg)   Height: 5' 3\" (1.6 m)       Physical Exam "   Constitutional: No distress.   HENT:   Mouth/Throat: Oropharynx is clear and moist.   Eyes: Right eye exhibits no discharge. Left eye exhibits no discharge.   The left lower lateral eyelid has redness and swelling   Neck: No JVD present. No thyromegaly present.   Cardiovascular: Normal heart sounds.   Pulmonary/Chest: Breath sounds normal. No respiratory distress.   Abdominal: Soft. Bowel sounds are normal. She exhibits no distension. There is no tenderness.   Musculoskeletal: She exhibits no edema.   Lymphadenopathy:     She has no cervical adenopathy.   Neurological: She is alert.   Skin: Skin is warm and dry.   Psychiatric: She has a normal mood and affect.   Nursing note and vitals reviewed.      Medication List:  Current Outpatient Medications   Medication Sig   ? acetaminophen (TYLENOL) 500 MG tablet Take 500 mg by mouth every 6 (six) hours as needed for pain.   ? albuterol (PROVENTIL) 2.5 mg /3 mL (0.083 %) nebulizer solution Take 2.5 mg by nebulization every 6 (six) hours as needed for wheezing.   ? apixaban (ELIQUIS) 2.5 mg Tab tablet Take 2.5 mg by mouth 2 (two) times a day.   ? artificial tears,hypromellose, (GENTEAL; SYSTANE) 0.3 % Gel Administer 1 drop to both eyes daily as needed.   ? ascorbic acid, vitamin C, (ASCORBIC ACID WITH MARCIE HIPS) 500 MG tablet Take 500 mg by mouth daily.   ? azaTHIOprine (IMURAN) 50 mg tablet Take 50 mg by mouth daily.   ? calcium carbonate-vitamin D3 (CALCIUM 500 + D) 500 mg(1,250mg) -400 unit Tab Take 1 tablet by mouth daily.   ? carboxymethylcellulose (REFRESH LIQUIGEL) 1 % ophthalmic solution Apply 1 drop to eye daily as needed.   ? carvedilol (COREG) 3.125 MG tablet Take 3.125 mg by mouth 2 (two) times a day with meals.   ? cetirizine (ZYRTEC) 10 MG tablet Take 10 mg by mouth at bedtime.   ? folic acid (FOLVITE) 1 MG tablet Take 1 mg by mouth daily.   ? furosemide (LASIX) 20 MG tablet Take 20 mg by mouth 2 (two) times a day.   ? hydrALAZINE (APRESOLINE) 25 MG tablet  Take 12.5 mg by mouth 3 (three) times a day.   ? isosorbide mononitrate (IMDUR) 60 MG 24 hr tablet Take 60 mg by mouth daily.   ? levothyroxine (SYNTHROID, LEVOTHROID) 75 MCG tablet Take 75 mcg by mouth daily.   ? morphine 100 mg/5 mL (20 mg/mL) concentrated solution Take 2.5 mg by mouth every 6 (six) hours as needed for pain.   ? nitroglycerin (NITROSTAT) 0.4 MG SL tablet Place 0.4 mg under the tongue every 5 (five) minutes as needed for chest pain.   ? omega-3 fatty acids 1,000 mg cap Take 1 capsule by mouth 2 (two) times a day.   ? ranitidine (ZANTAC) 150 MG tablet Take 150 mg by mouth 2 (two) times a day.   ? Saccharomyces boulardii (FLORASTOR) 250 mg capsule Take 250 mg by mouth daily.   ? senna-docusate (SENNOSIDES-DOCUSATE SODIUM) 8.6-50 mg tablet Take 1 tablet by mouth daily as needed for constipation.   ? trimethoprim (TRIMPEX) 100 mg tablet Take 50 mg by mouth daily.   ? vit A/vit C/vit E/zinc/copper (PRESERVISION AREDS ORAL) Take 1 capsule by mouth 2 (two) times a day.       Labs:  No new laboratory testing    Assessment:    ICD-10-CM    1. Acute on chronic diastolic congestive heart failure (H) I50.33    2. Paroxysmal atrial fibrillation (H) I48.0    3. Mitral valve insufficiency, unspecified etiology I34.0    4. Pulmonary fibrosis (H) J84.10    5. Rheumatoid arthritis involving multiple sites, unspecified rheumatoid factor presence (H) M06.9    6. Osteoporosis, unspecified osteoporosis type, unspecified pathological fracture presence M81.0    7. Chronic kidney disease, unspecified CKD stage N18.9        Plan:  She has been evaluated and treated by occupational and physical therapy.  She desires to discharge home in the near future and at that time she will require home occupational therapy, home physical therapy, and home health aide.  The home occupational therapy is to evaluate and treat for strengthening, ADL needs, adaptive equipment and safety.  The home physical therapy is to evaluate and treat  for strengthening, balance, endurance, and safety with mobility and ambulation.  The home health aide is for bathing and ADL needs.      Electronically signed by: Mehran Holley MD

## 2021-06-18 NOTE — LETTER
Letter by Elmer Harmon CNP at      Author: Elmer Harmon CNP Service: -- Author Type: --    Filed:  Encounter Date: 2019 Status: (Other)         Patient: Linda Spicer   MR Number: [id number]   YOB: 1931   Date of Visit: 2019          [facility]    NAME:  Linda Spicer             :  1931  MRN: [id number]  CODE STATUS:  DNR    FACILITY:  formerly [facility] [[id number]]       ROOM:   119    CHIEF COMPLAINT/REASON FOR VISIT:  Chief Complaint   Patient presents with   ? Problem Visit     CHF exacerbation     HISTORY OF PRESENT ILLNESS: Linda Spicer is a 87 y.o. female with Rheumatoid arthritis with RA-ILD, Heart failure with preserved EF, Paroxysmal atrial fibrillation and Atrial flutter s/p multiple cardioversions on Apixaban, Tachy-lisa syndrome s/p pacemaker placement, Hypothyroidism, and CKD who presented to [facility] on 1/15/2019 with left sided chest pain and shortness of breath on exertion. She had been feeling poorly for sometime, really at least since 2018. Around then, she fell while exercising and sprained her jaw and broke her tailbone. Ever sense then, she has felt short of breaht, which has progressed. Afterward, she had an episode of hip bursitis, and in September, her  . With all of this, shortness of breath continued to progress. In Oct/Nov, she was hospitalized due to this, which has now recurred a few times. Of note, looking back to last year, she already had complaints of progressive dyspnea (prior to July).     She presented to her rheumatologist on day of admission, and looked very short of breath, and complained of left chest pain. She was sent to the hospital. She has also had decreasing exercise tolerance, now having difficulty walking across a room due to shortness of breath. She also gets out of breath while talking. Her chest pain was pressure-like and located on the left  "chest. This pain comes on with exertion, and improves with rest. She had no orthopnea, fevers, or cough. At presentation, she was hypertensive, and BNP was elevated. She was given lasix and admitted. Since admission, the cardiology team has been working on improved blood pressure control. They evaluated her mitral valve, which was no worse than prior. Pulmonary was consulted due to ongoing shortness of breath.     She has had LE edema, but this is pretty stable from baseline. She also noted a 4 pound weight gain since October. Her appetite is normal. She was previously quite active, but has not been able to maintain activity level due to dyspnea. She previously has required oxygen in the past, when she used to travel to [place] and live in a mobile home. She would always get hypoxic while there and need oxygen, which would resolve quickly when she returned to [place]. Due to this, she eventually sold her home in [place] in about 2013, and hasn't been back. She hasn't needed oxygen at home since then. Note notes that she feels like she is \"slowly dying\" and that no one can figure out why. She worries she is a burden to her family.     Ms. Spicer was last seen in pulmonary clinic in January 2018 by Dr. Comer. At that time, her breathing was stable, but she had been noted to have increased work of breathing with talking. As her RA-ILD had been stable for years, Dr. Comer believed that her dyspnea was due to fatigue associated with blood pressure medications, rather than progression of underlying lung disease, especially as oxygen saturations were stable. At that time, PFTs were stable from prior. In December 2018, she had PFTs again, which did show a reduction in her FVC and FEV1, however, but both were within the range of prior values.  Patient was stabilized and transferred to TCU for continued rehabilitation.       Past Medical History:   Diagnosis Date   ? Anemia    ? CHF (congestive heart failure) (H)    ? " CKD (chronic kidney disease) stage 3, GFR 30-59 ml/min (H)    ? DDD (degenerative disc disease), lumbar    ? Diffuse idiopathic pulmonary fibrosis (H)    ? HTN (hypertension)    ? Hypothyroid    ? IBS (irritable bowel syndrome)    ? Macular degeneration    ? Nondisplaced spiral fracture of shaft of humerus    ? Osteoporosis    ? RA (rheumatoid arthritis) (H)    ? Shingles    ? Spinal stenosis of lumbar region with neurogenic claudication    ? Tachycardia-bradycardia (H)      Past Surgical History:   Procedure Laterality Date   ? CARDIAC PACEMAKER PLACEMENT  02/2017   ? CARDIOVERSION  09/14/2018    10/11/18, 10/16/18   ? DILATION AND CURETTAGE OF UTERUS      x3   ? HEMORROIDECTOMY     ? HYSTERECTOMY     ? LUMBAR LAMINECTOMY  10/13/2015    Procedure: LAMINECTOMY LUMBAR ONE LEVEL; Surgeon: Fransico Toussaint MD; Location: [facility] OR   ? TONSILLECTOMY       Family History   Problem Relation Age of Onset   ? Hypertension Mother    ? Other Father         psychotic disorder    ? Diabetes Son    ? Diabetes Daughter      Social History     Socioeconomic History   ? Marital status:      Spouse name: Not on file   ? Number of children: Not on file   ? Years of education: Not on file   ? Highest education level: Not on file   Social Needs   ? Financial resource strain: Not on file   ? Food insecurity - worry: Not on file   ? Food insecurity - inability: Not on file   ? Transportation needs - medical: Not on file   ? Transportation needs - non-medical: Not on file   Occupational History   ? Not on file   Tobacco Use   ? Smoking status: Never Smoker   ? Smokeless tobacco: Never Used   Substance and Sexual Activity   ? Alcohol use: Yes   ? Drug use: No   ? Sexual activity: No   Other Topics Concern   ? Not on file   Social History Narrative   ? Not on file     Allergies   Allergen Reactions   ? Cephalexin Diarrhea     Other reaction(s): GI Upset     ? Gabapentin Other (See Comments)     Shaky, dizzy, dry mouth  Dizzsiness      ? Naproxen Nausea Only     Constipation. Tolerates ibuprofen.     ? Perfume    ? Alprazolam Dizziness            ? Nitrofurantoin      Possibly related to lung disease    ? Sulfa (Sulfonamide Antibiotics)    ? Ciprofloxacin Itching and Rash     Current Outpatient Medications   Medication Sig Dispense Refill   ? acetaminophen (TYLENOL) 500 MG tablet Take 500 mg by mouth every 6 (six) hours as needed for pain.     ? albuterol (PROVENTIL) 2.5 mg /3 mL (0.083 %) nebulizer solution Take 2.5 mg by nebulization every 6 (six) hours as needed for wheezing.     ? apixaban (ELIQUIS) 2.5 mg Tab tablet Take 2.5 mg by mouth 2 (two) times a day.     ? artificial tears,hypromellose, (GENTEAL; SYSTANE) 0.3 % Gel Administer 1 drop to both eyes daily as needed.     ? ascorbic acid, vitamin C, (ASCORBIC ACID WITH MARCIE HIPS) 500 MG tablet Take 500 mg by mouth daily.     ? azaTHIOprine (IMURAN) 50 mg tablet Take 50 mg by mouth daily.     ? calcium carbonate-vitamin D3 (CALCIUM 500 + D) 500 mg(1,250mg) -400 unit Tab Take 1 tablet by mouth daily.     ? carboxymethylcellulose (REFRESH LIQUIGEL) 1 % ophthalmic solution Apply 1 drop to eye daily as needed.     ? carvedilol (COREG) 3.125 MG tablet Take 3.125 mg by mouth 2 (two) times a day with meals.     ? cetirizine (ZYRTEC) 10 MG tablet Take 10 mg by mouth at bedtime.     ? folic acid (FOLVITE) 1 MG tablet Take 1 mg by mouth daily.     ? furosemide (LASIX) 20 MG tablet Take 20 mg by mouth 2 (two) times a day.     ? hydrALAZINE (APRESOLINE) 25 MG tablet Take 12.5 mg by mouth 3 (three) times a day.     ? isosorbide mononitrate (IMDUR) 60 MG 24 hr tablet Take 60 mg by mouth daily.     ? levothyroxine (SYNTHROID, LEVOTHROID) 75 MCG tablet Take 75 mcg by mouth daily.     ? morphine 100 mg/5 mL (20 mg/mL) concentrated solution Take 2.5 mg by mouth every 6 (six) hours as needed for pain.     ? nitroglycerin (NITROSTAT) 0.4 MG SL tablet Place 0.4 mg under the tongue every 5 (five) minutes as  needed for chest pain.     ? omega-3 fatty acids 1,000 mg cap Take 1 capsule by mouth 2 (two) times a day.     ? ranitidine (ZANTAC) 150 MG tablet Take 150 mg by mouth 2 (two) times a day.     ? Saccharomyces boulardii (FLORASTOR) 250 mg capsule Take 250 mg by mouth daily.     ? senna-docusate (SENNOSIDES-DOCUSATE SODIUM) 8.6-50 mg tablet Take 1 tablet by mouth daily as needed for constipation.     ? trimethoprim (TRIMPEX) 100 mg tablet Take 50 mg by mouth daily.     ? vit A/vit C/vit E/zinc/copper (PRESERVISION AREDS ORAL) Take 1 capsule by mouth 2 (two) times a day.       No current facility-administered medications for this visit.      REVIEW OF SYSTEMS:    Currently, no fever, chills, or rigors. Does not have any visual or hearing problems. Denies any chest pain, headaches, palpitations, lightheadedness, dizziness, shortness of breath, or cough. Appetite is good. Denies any GERD symptoms. Denies any difficulty with swallowing, nausea, or vomiting.  Denies any abdominal pain, diarrhea or constipation. Denies any urinary symptoms. No insomnia. No active bleeding. No rash.     PHYSICAL EXAMINATION:  Vitals:    02/05/19 1441   BP: 153/72   Pulse: 70   Resp: 16   Temp: 97.8  F (36.6  C)   SpO2: 97%   Weight: 162 lb 11.2 oz (73.8 kg)       GENERAL: Awake, Alert, oriented x3, not in any form of acute distress, answers questions appropriately, follows simple commands, conversant  HEENT: Head is normocephalic with normal hair distribution. No evidence of trauma. Ears: No acute purulent discharge. Eyes: Conjunctivae pink with no scleral jaundice. Nose: Normal mucosa and septum. NECK: Supple with no cervical or supraclavicular lymphadenopathy. Trachea is midline.   CHEST: No tenderness or deformity, no crepitus  LUNG: Clear to auscultation with good chest expansion. There are no crackles or wheezes, normal AP diameter.  BACK: No kyphosis of the thoracic spine. Symmetric, no curvature, ROM normal, no CVA tenderness, no  spinal tenderness   CVS: There is good S1  S2, there are no murmurs, rubs, gallops, or heaves, rhythm is regular,  2+ pulses symmetric in all extremities.  ABDOMEN: Globular and soft, nontender to palpation, non distended, no masses, no organomegaly, good bowel sounds, no rebound or guarding, no peritoneal signs.   EXTREMITIES:  Full range of motion on both upper and lower extremities, there is no tenderness to palpation, no pedal edema, no cyanosis or clubbing, no calf tenderness.  Pulses equal in all extremities, normal cap refill, no joint swelling.  SKIN: Warm and dry, no erythema noted.  Skin color, texture, no rashes or lesions.  NEUROLOGICAL: The patient is oriented to person, place and time. Strength and sensation are grossly intact. Face is symmetric.    LABS:  All labs reviewed in the nursing home record.    ASSESSMENT/PLAN:    1. Acute on chronic combined systolic and diastolic CHF (congestive heart failure) (H) - Followed by Cardiology and Pulmonology, will continue Lasix   2. Essential hypertension - Blood pressures are within target range, will continue Imdur and Hydralazine   3. Chronic kidney disease, unspecified CKD stage - Stable   4. Recurrent UTI Followed by Urology, will continue Trimethoprim   5. PAF (paroxysmal atrial fibrillation) (H) - s/p multiple cardioversion, will continue Apixaban   6. Tachy-lisa syndrome (H) - s/p PPM 2/17.         Electronically signed by:  Elmer Harmon CNP    35 minutes TT of which 50% was spent in counseling and coordination of care of the above plan.    Time spent in interview and examination of patient, review of available records, and discussion with nursing staff. Continue care plan, efforts at therapy, and monitor nutritional status.

## 2021-06-23 NOTE — PROGRESS NOTES
[facility]    NAME:  Linda Spicer             :  1931  MRN: [id number]  CODE STATUS:  DNR    FACILITY:  [facility] [[id number]]       ROOM:   119    CHIEF COMPLAINT/REASON FOR VISIT:  Chief Complaint   Patient presents with     Problem Visit     CHF exacerbation     HISTORY OF PRESENT ILLNESS: Linda Spicer is a 87 y.o. female with Rheumatoid arthritis with RA-ILD, Heart failure with preserved EF, Paroxysmal atrial fibrillation and Atrial flutter s/p multiple cardioversions on Apixaban, Tachy-lisa syndrome s/p pacemaker placement, Hypothyroidism, and CKD who presented to [facility] on 1/15/2019 with left sided chest pain and shortness of breath on exertion. She had been feeling poorly for sometime, really at least since 2018. Around then, she fell while exercising and sprained her jaw and broke her tailbone. Ever sense then, she has felt short of breaht, which has progressed. Afterward, she had an episode of hip bursitis, and in September, her  . With all of this, shortness of breath continued to progress. In Oct/Nov, she was hospitalized due to this, which has now recurred a few times. Of note, looking back to last year, she already had complaints of progressive dyspnea (prior to July).     She presented to her rheumatologist on day of admission, and looked very short of breath, and complained of left chest pain. She was sent to the hospital. She has also had decreasing exercise tolerance, now having difficulty walking across a room due to shortness of breath. She also gets out of breath while talking. Her chest pain was pressure-like and located on the left chest. This pain comes on with exertion, and improves with rest. She had no orthopnea, fevers, or cough. At presentation, she was hypertensive, and BNP was elevated. She was given lasix and admitted. Since admission, the cardiology team has been working on improved blood pressure control. They  "evaluated her mitral valve, which was no worse than prior. Pulmonary was consulted due to ongoing shortness of breath.     She has had LE edema, but this is pretty stable from baseline. She also noted a 4 pound weight gain since October. Her appetite is normal. She was previously quite active, but has not been able to maintain activity level due to dyspnea. She previously has required oxygen in the past, when she used to travel to [place] and live in a mobile home. She would always get hypoxic while there and need oxygen, which would resolve quickly when she returned to [place]. Due to this, she eventually sold her home in [place] in about 2013, and hasn't been back. She hasn't needed oxygen at home since then. Note notes that she feels like she is \"slowly dying\" and that no one can figure out why. She worries she is a burden to her family.     Ms. Spicer was last seen in pulmonary clinic in January 2018 by Dr. Comer. At that time, her breathing was stable, but she had been noted to have increased work of breathing with talking. As her RA-ILD had been stable for years, Dr. Comer believed that her dyspnea was due to fatigue associated with blood pressure medications, rather than progression of underlying lung disease, especially as oxygen saturations were stable. At that time, PFTs were stable from prior. In December 2018, she had PFTs again, which did show a reduction in her FVC and FEV1, however, but both were within the range of prior values.  Patient was stabilized and transferred to TCU for continued rehabilitation.       Past Medical History:   Diagnosis Date     Anemia      CHF (congestive heart failure) (H)      CKD (chronic kidney disease) stage 3, GFR 30-59 ml/min (H)      DDD (degenerative disc disease), lumbar      Diffuse idiopathic pulmonary fibrosis (H)      HTN (hypertension)      Hypothyroid      IBS (irritable bowel syndrome)      Macular degeneration      Nondisplaced spiral fracture of " shaft of humerus      Osteoporosis      RA (rheumatoid arthritis) (H)      Shingles      Spinal stenosis of lumbar region with neurogenic claudication      Tachycardia-bradycardia (H)      Past Surgical History:   Procedure Laterality Date     CARDIAC PACEMAKER PLACEMENT  02/2017     CARDIOVERSION  09/14/2018    10/11/18, 10/16/18     DILATION AND CURETTAGE OF UTERUS      x3     HEMORROIDECTOMY       HYSTERECTOMY       LUMBAR LAMINECTOMY  10/13/2015    Procedure: LAMINECTOMY LUMBAR ONE LEVEL; Surgeon: Fransico Toussaint MD; Location: [facility] OR     TONSILLECTOMY       Family History   Problem Relation Age of Onset     Hypertension Mother      Other Father         psychotic disorder      Diabetes Son      Diabetes Daughter      Social History     Socioeconomic History     Marital status:      Spouse name: Not on file     Number of children: Not on file     Years of education: Not on file     Highest education level: Not on file   Social Needs     Financial resource strain: Not on file     Food insecurity - worry: Not on file     Food insecurity - inability: Not on file     Transportation needs - medical: Not on file     Transportation needs - non-medical: Not on file   Occupational History     Not on file   Tobacco Use     Smoking status: Never Smoker     Smokeless tobacco: Never Used   Substance and Sexual Activity     Alcohol use: Yes     Drug use: No     Sexual activity: No   Other Topics Concern     Not on file   Social History Narrative     Not on file     Allergies   Allergen Reactions     Cephalexin Diarrhea     Other reaction(s): GI Upset       Gabapentin Other (See Comments)     Shaky, dizzy, dry mouth  Dizzsiness       Naproxen Nausea Only     Constipation. Tolerates ibuprofen.       Perfume      Alprazolam Dizziness              Nitrofurantoin      Possibly related to lung disease      Sulfa (Sulfonamide Antibiotics)      Ciprofloxacin Itching and Rash     Current Outpatient Medications   Medication  Sig Dispense Refill     acetaminophen (TYLENOL) 500 MG tablet Take 500 mg by mouth every 6 (six) hours as needed for pain.       albuterol (PROVENTIL) 2.5 mg /3 mL (0.083 %) nebulizer solution Take 2.5 mg by nebulization every 6 (six) hours as needed for wheezing.       apixaban (ELIQUIS) 2.5 mg Tab tablet Take 2.5 mg by mouth 2 (two) times a day.       artificial tears,hypromellose, (GENTEAL; SYSTANE) 0.3 % Gel Administer 1 drop to both eyes daily as needed.       ascorbic acid, vitamin C, (ASCORBIC ACID WITH MARCIE HIPS) 500 MG tablet Take 500 mg by mouth daily.       azaTHIOprine (IMURAN) 50 mg tablet Take 50 mg by mouth daily.       calcium carbonate-vitamin D3 (CALCIUM 500 + D) 500 mg(1,250mg) -400 unit Tab Take 1 tablet by mouth daily.       carboxymethylcellulose (REFRESH LIQUIGEL) 1 % ophthalmic solution Apply 1 drop to eye daily as needed.       carvedilol (COREG) 3.125 MG tablet Take 3.125 mg by mouth 2 (two) times a day with meals.       cetirizine (ZYRTEC) 10 MG tablet Take 10 mg by mouth at bedtime.       folic acid (FOLVITE) 1 MG tablet Take 1 mg by mouth daily.       furosemide (LASIX) 20 MG tablet Take 20 mg by mouth 2 (two) times a day.       hydrALAZINE (APRESOLINE) 25 MG tablet Take 12.5 mg by mouth 3 (three) times a day.       isosorbide mononitrate (IMDUR) 60 MG 24 hr tablet Take 60 mg by mouth daily.       levothyroxine (SYNTHROID, LEVOTHROID) 75 MCG tablet Take 75 mcg by mouth daily.       morphine 100 mg/5 mL (20 mg/mL) concentrated solution Take 2.5 mg by mouth every 6 (six) hours as needed for pain.       nitroglycerin (NITROSTAT) 0.4 MG SL tablet Place 0.4 mg under the tongue every 5 (five) minutes as needed for chest pain.       omega-3 fatty acids 1,000 mg cap Take 1 capsule by mouth 2 (two) times a day.       ranitidine (ZANTAC) 150 MG tablet Take 150 mg by mouth 2 (two) times a day.       Saccharomyces boulardii (FLORASTOR) 250 mg capsule Take 250 mg by mouth daily.       senna-docusate  (SENNOSIDES-DOCUSATE SODIUM) 8.6-50 mg tablet Take 1 tablet by mouth daily as needed for constipation.       trimethoprim (TRIMPEX) 100 mg tablet Take 50 mg by mouth daily.       vit A/vit C/vit E/zinc/copper (PRESERVISION AREDS ORAL) Take 1 capsule by mouth 2 (two) times a day.       No current facility-administered medications for this visit.      REVIEW OF SYSTEMS:    Currently, no fever, chills, or rigors. Does not have any visual or hearing problems. Denies any chest pain, headaches, palpitations, lightheadedness, dizziness, shortness of breath, or cough. Appetite is good. Denies any GERD symptoms. Denies any difficulty with swallowing, nausea, or vomiting.  Denies any abdominal pain, diarrhea or constipation. Denies any urinary symptoms. No insomnia. No active bleeding. No rash.     PHYSICAL EXAMINATION:  Vitals:    02/05/19 1441   BP: 153/72   Pulse: 70   Resp: 16   Temp: 97.8  F (36.6  C)   SpO2: 97%   Weight: 162 lb 11.2 oz (73.8 kg)       GENERAL: Awake, Alert, oriented x3, not in any form of acute distress, answers questions appropriately, follows simple commands, conversant  HEENT: Head is normocephalic with normal hair distribution. No evidence of trauma. Ears: No acute purulent discharge. Eyes: Conjunctivae pink with no scleral jaundice. Nose: Normal mucosa and septum. NECK: Supple with no cervical or supraclavicular lymphadenopathy. Trachea is midline.   CHEST: No tenderness or deformity, no crepitus  LUNG: Clear to auscultation with good chest expansion. There are no crackles or wheezes, normal AP diameter.  BACK: No kyphosis of the thoracic spine. Symmetric, no curvature, ROM normal, no CVA tenderness, no spinal tenderness   CVS: There is good S1  S2, there are no murmurs, rubs, gallops, or heaves, rhythm is regular,  2+ pulses symmetric in all extremities.  ABDOMEN: Globular and soft, nontender to palpation, non distended, no masses, no organomegaly, good bowel sounds, no rebound or guarding, no  peritoneal signs.   EXTREMITIES:  Full range of motion on both upper and lower extremities, there is no tenderness to palpation, no pedal edema, no cyanosis or clubbing, no calf tenderness.  Pulses equal in all extremities, normal cap refill, no joint swelling.  SKIN: Warm and dry, no erythema noted.  Skin color, texture, no rashes or lesions.  NEUROLOGICAL: The patient is oriented to person, place and time. Strength and sensation are grossly intact. Face is symmetric.    LABS:  All labs reviewed in the nursing home record.    ASSESSMENT/PLAN:    1. Acute on chronic combined systolic and diastolic CHF (congestive heart failure) (H) - Followed by Cardiology and Pulmonology, will continue Lasix   2. Essential hypertension - Blood pressures are within target range, will continue Imdur and Hydralazine   3. Chronic kidney disease, unspecified CKD stage - Stable   4. Recurrent UTI Followed by Urology, will continue Trimethoprim   5. PAF (paroxysmal atrial fibrillation) (H) - s/p multiple cardioversion, will continue Apixaban   6. Tachy-lisa syndrome (H) - s/p PPM 2/17.         Electronically signed by:  Elmre Harmon CNP    35 minutes TT of which 50% was spent in counseling and coordination of care of the above plan.    Time spent in interview and examination of patient, review of available records, and discussion with nursing staff. Continue care plan, efforts at therapy, and monitor nutritional status.

## 2021-06-23 NOTE — PROGRESS NOTES
[facility]      Facility:    [facility] [[id number]]  Code Status: DNR      Chief Complaint/Reason for Visit:  Chief Complaint   Patient presents with     H & P       HPI:   Linda is a 87 y.o. female who presented to the hospital with fatigue and shortness of breath.  This is been progressively worsening for 1 week prior to admission.  She was especially short of breath on exertion.  She could not even complete a sentence due to shortness of breath.  It was determined that the underlying problem was acute on chronic respiratory failure with acute on chronic diastolic congestive heart failure.  She also has underlying medical conditions of pulmonary fibrosis secondary to rheumatoid arthritis.  She does have paroxysmal atrial fibrillation with multiple cardioversions.  She has tachybradycardia syndrome status post permanent pacemaker.  She also has Chronic kidney disease, hypothyroidism, osteoporosis, and hypertension.    Upon current review of systems, she is not having fevers or chills.  She does not have sore throat.  She does not have cough for shortness of breath beyond her baseline.  She is not experiencing chest pain or palpitations of the heart.  She does not have abdominal pain or nausea.  She does not have dysuria.  She was recently diagnosed with a chalazion and is using warm packs to her eye for treatment and will have a follow-up in a couple of weeks.    Past Medical History:  Past Medical History:   Diagnosis Date     Anemia      CHF (congestive heart failure) (H)      CKD (chronic kidney disease) stage 3, GFR 30-59 ml/min (H)      DDD (degenerative disc disease), lumbar      Diffuse idiopathic pulmonary fibrosis (H)      HTN (hypertension)      Hypothyroid      IBS (irritable bowel syndrome)      Macular degeneration      Nondisplaced spiral fracture of shaft of humerus      Osteoporosis      RA (rheumatoid arthritis) (H)      Shingles      Spinal stenosis of lumbar  "region with neurogenic claudication      Tachycardia-bradycardia (H)            Surgical History:  Past Surgical History:   Procedure Laterality Date     CARDIAC PACEMAKER PLACEMENT  02/2017     CARDIOVERSION  09/14/2018    10/11/18, 10/16/18     DILATION AND CURETTAGE OF UTERUS      x3     HEMORROIDECTOMY       HYSTERECTOMY       LUMBAR LAMINECTOMY  10/13/2015    Procedure: LAMINECTOMY LUMBAR ONE LEVEL; Surgeon: Fransico Toussaint MD; Location: [facility] OR     TONSILLECTOMY         Family History:   Family History   Problem Relation Age of Onset     Hypertension Mother      Other Father         psychotic disorder      Diabetes Son      Diabetes Daughter        Social History:    Social History     Socioeconomic History     Marital status:      Spouse name: Not on file     Number of children: Not on file     Years of education: Not on file     Highest education level: Not on file   Social Needs     Financial resource strain: Not on file     Food insecurity - worry: Not on file     Food insecurity - inability: Not on file     Transportation needs - medical: Not on file     Transportation needs - non-medical: Not on file   Occupational History     Not on file   Tobacco Use     Smoking status: Never Smoker     Smokeless tobacco: Never Used   Substance and Sexual Activity     Alcohol use: Yes     Drug use: No     Sexual activity: No   Other Topics Concern     Not on file   Social History Narrative     Not on file          Review of Systems   All other systems reviewed and are negative.      Vitals:    01/23/19 1236   BP: 143/67   Pulse: 70   Resp: 19   Temp: 97.7  F (36.5  C)   SpO2: 96%   Weight: 162 lb (73.5 kg)   Height: 5' 3\" (1.6 m)       Physical Exam   Constitutional: No distress.   HENT:   Mouth/Throat: Oropharynx is clear and moist.   Eyes: Right eye exhibits no discharge. Left eye exhibits no discharge.   The left lower lateral eyelid has redness and swelling   Neck: No JVD present. No thyromegaly " present.   Cardiovascular: Normal heart sounds.   Pulmonary/Chest: Breath sounds normal. No respiratory distress.   Abdominal: Soft. Bowel sounds are normal. She exhibits no distension. There is no tenderness.   Musculoskeletal: She exhibits no edema.   Lymphadenopathy:     She has no cervical adenopathy.   Neurological: She is alert.   Skin: Skin is warm and dry.   Psychiatric: She has a normal mood and affect.   Nursing note and vitals reviewed.      Medication List:  Current Outpatient Medications   Medication Sig     acetaminophen (TYLENOL) 500 MG tablet Take 500 mg by mouth every 6 (six) hours as needed for pain.     albuterol (PROVENTIL) 2.5 mg /3 mL (0.083 %) nebulizer solution Take 2.5 mg by nebulization every 6 (six) hours as needed for wheezing.     apixaban (ELIQUIS) 2.5 mg Tab tablet Take 2.5 mg by mouth 2 (two) times a day.     artificial tears,hypromellose, (GENTEAL; SYSTANE) 0.3 % Gel Administer 1 drop to both eyes daily as needed.     ascorbic acid, vitamin C, (ASCORBIC ACID WITH MARCIE HIPS) 500 MG tablet Take 500 mg by mouth daily.     azaTHIOprine (IMURAN) 50 mg tablet Take 50 mg by mouth daily.     calcium carbonate-vitamin D3 (CALCIUM 500 + D) 500 mg(1,250mg) -400 unit Tab Take 1 tablet by mouth daily.     carboxymethylcellulose (REFRESH LIQUIGEL) 1 % ophthalmic solution Apply 1 drop to eye daily as needed.     carvedilol (COREG) 3.125 MG tablet Take 3.125 mg by mouth 2 (two) times a day with meals.     cetirizine (ZYRTEC) 10 MG tablet Take 10 mg by mouth at bedtime.     folic acid (FOLVITE) 1 MG tablet Take 1 mg by mouth daily.     furosemide (LASIX) 20 MG tablet Take 20 mg by mouth 2 (two) times a day.     hydrALAZINE (APRESOLINE) 25 MG tablet Take 12.5 mg by mouth 3 (three) times a day.     isosorbide mononitrate (IMDUR) 60 MG 24 hr tablet Take 60 mg by mouth daily.     levothyroxine (SYNTHROID, LEVOTHROID) 75 MCG tablet Take 75 mcg by mouth daily.     morphine 100 mg/5 mL (20 mg/mL)  concentrated solution Take 2.5 mg by mouth every 6 (six) hours as needed for pain.     nitroglycerin (NITROSTAT) 0.4 MG SL tablet Place 0.4 mg under the tongue every 5 (five) minutes as needed for chest pain.     omega-3 fatty acids 1,000 mg cap Take 1 capsule by mouth 2 (two) times a day.     ranitidine (ZANTAC) 150 MG tablet Take 150 mg by mouth 2 (two) times a day.     Saccharomyces boulardii (FLORASTOR) 250 mg capsule Take 250 mg by mouth daily.     senna-docusate (SENNOSIDES-DOCUSATE SODIUM) 8.6-50 mg tablet Take 1 tablet by mouth daily as needed for constipation.     trimethoprim (TRIMPEX) 100 mg tablet Take 50 mg by mouth daily.     vit A/vit C/vit E/zinc/copper (PRESERVISION AREDS ORAL) Take 1 capsule by mouth 2 (two) times a day.       Labs:  No new laboratory testing    Assessment:    ICD-10-CM    1. Acute on chronic diastolic congestive heart failure (H) I50.33    2. Paroxysmal atrial fibrillation (H) I48.0    3. Mitral valve insufficiency, unspecified etiology I34.0    4. Pulmonary fibrosis (H) J84.10    5. Rheumatoid arthritis involving multiple sites, unspecified rheumatoid factor presence (H) M06.9    6. Osteoporosis, unspecified osteoporosis type, unspecified pathological fracture presence M81.0    7. Chronic kidney disease, unspecified CKD stage N18.9        Plan:  She has been evaluated and treated by occupational and physical therapy.  She desires to discharge home in the near future and at that time she will require home occupational therapy, home physical therapy, and home health aide.  The home occupational therapy is to evaluate and treat for strengthening, ADL needs, adaptive equipment and safety.  The home physical therapy is to evaluate and treat for strengthening, balance, endurance, and safety with mobility and ambulation.  The home health aide is for bathing and ADL needs.      Electronically signed by: Mehran Holley MD

## 2021-09-04 ENCOUNTER — HEALTH MAINTENANCE LETTER (OUTPATIENT)
Age: 86
End: 2021-09-04

## 2022-02-19 ENCOUNTER — HEALTH MAINTENANCE LETTER (OUTPATIENT)
Age: 87
End: 2022-02-19

## 2022-03-09 ENCOUNTER — MEDICAL CORRESPONDENCE (OUTPATIENT)
Dept: HEALTH INFORMATION MANAGEMENT | Facility: CLINIC | Age: 87
End: 2022-03-09
Payer: MEDICARE

## 2022-04-19 NOTE — TELEPHONE ENCOUNTER
Patient called. Has been getting Eliquis samples from [employer], but apparently cannot get them anymore. She did not apply in Jan of this year for more samples because she had alot of quantity left. She has now run out and has a new insurance. A 30 day supply cost her $200. She is asking what to do.     She will call [employer] and inquire about more samples, length of time frame she can get samples for and if there is a cost. If she cannot get patient assistance with them she will call back. Dr Loza may be able to get samples mailed to her.      Last seen by Dr Loza 3/13/19. Takes Eliquis for PAF, has CHADS2-Vasc score of 4.     DBaBRANDI purvis    
Pt called asking to speak with Stacey. She states that she got samples of Eliquis this year but has run out. She recently chanted insurance and has to pay $200/month for the Eliquis. She is wondering what the long term plan is for her? Will she have to pay $200/month for the rest of the year? And what is the plan for next year? States this is getting very expensive for her. Wants to know what her options are. Will route to team to advise. Lilibeth Loaiza RN    
normal

## 2022-08-15 NOTE — TELEPHONE ENCOUNTER
Reason for Call:  Other prescription refill request for azaTHIOprine (IMURAN) 50 MG tablet    Detailed comments: Patient was called that she had already picked up medication.  She has not picked it up.    Please call patient to clarify.    Phone Number Patient can be reached at: Home number on file [phone] (home)    Best Time: anytime    Can we leave a detailed message on this number? YES     Thank you,    Call taken on 7/21/2017 at 11:45 AM by Toya Chiang       Yes

## 2022-08-30 NOTE — PLAN OF CARE
PT 6C:  Acknowledge order placed for PT eval and treat.  Deferring Pt consult as no acute needs have been identified requiring skilled PT intervention to facilitate safe discharge.    Decisions made based upon chart review and discussion with OT.   Baseline level of assist for mobility and ADL: Mobilizes mod (I) with 4WW, mod (I) with AE for ADL  Current level of of assist for mobility ADLs:  Mobilizing SBA with 4WW, SBA for ADL's.  Main limitation is aerobic endurance, and OT will be addressing this and cardiac rehab.  Barriers to discharge: Medical status; none from mobility standpoint  Disciplines to follow: OT for CR and discharge needs.  Agree with OT's recommendation for HHPT/OT, as pt has had falls in last year and would likely benefit from strength and safety training with HHPT in her own environment.     Heterosexual

## 2023-06-08 NOTE — PROGRESS NOTES
Clinic Care Coordination Contact  [facility]/Voicemail       Clinical Data: Care Coordinator Outreach  Outreach attempted x 1.  No voicemail on home phone number.  RN CC attempted to contact mobile number, but patient's son Arjun answered and advised RN CC to attempt patient's home number one more time, as patient does have voicemail.  RN CC attempted patient's home number 1 more time, however, it rang dozens of times with no answer or voicemail.  Plan: Care Coordinator mailed out care coordination introduction letter on 4/6/16. Care Coordinator will try to reach patient again in 1-2 business days.    Melissa Behl BSN, RN, N  [facility] Care Coordinator  [phone]        none

## 2023-09-28 NOTE — TELEPHONE ENCOUNTER
Received call from daughter. Patient had gone to [facility] ED via ambulance on 2/21 for palpitations and dizziness. She states that Linda was very concerned that something was wrong with her heart or her pacemaker. Per note in Care Everywhere the pacemaker interrogation was normal. Daughter said they were told that the patient should be seen within one week for follow up with Dr Loza. There are no appointment openings available in [place]. A follow up was scheduled with Dr Santos on March 8th, but she was given the phone number for the [facility] to see if they could accommodate the one week follow up request.    1st degree heart block

## 2023-10-18 NOTE — PLAN OF CARE
LMTCB   Son Arjun called stating that  had asked for Linda's blood pressure history  Arjun gave me the following information    1/8/19 AM /107 HR 73 //  PM  /103 HR 69  1/9/19 /102 HR 72  1/10/19 BP  112/86 HR 72  1/11/19 /116 HR 72  1/12/19 /119 HR 77  1/13/19 AM /119 HR 77 // PM /79 HR 70  1/14/19 /74 HR 62  1/28/19 /84 HR 66  1/29/19 /75 HR 72  1/30/19 /63 HR 73  1/31/19 /75 HR 72  2/1/19 /72 HR 70 // /72 HR 99  2/2/19 /75 HR 66  2/3/19 /75 HR 72  2/4/19 /84 HR 70  2/5/19 /84 HR 71  2/6/19 /74 HR 71  2/7/19 /58 HR 74

## 2024-04-01 NOTE — H&P
History and Physical    Cards 1     Date of Service: 18  Date of Admission: 2018  Patient Name: Linda Spicer  : 1931  MRN: [id number]       Chief complaint:   Generalized weakness     History of Present Illness:   Linda Spicer is a 87 year old female with PMH of HFpEF, PAF/Afluter (CHADDSVASC4 on eliquis) s/p multiple cardioversions currently on amiodarone, tachy lisa s/p PPM  rheumatoid pulmonary fibrosis, CKD, hypothyroidism,  who presents with fatigue and shortness of breath. She states she has been feeling poorly about a week ago she feels she is about to faint is short of breath at times at rest and with any minimal exertion she gets lightheaded and at times feels she is about to pass out. She states her flutter feels somewhat different since she has the pacer she cant tell when she is in an abnormal rhythm but she gets fatigued quicker, she was cardioverted on 10/16/18. She went to core clinic yesterday she has been having high blood pressures since  yesterday as high as 174/90 with pulse remaining in the 60's. Her weight has not changes much she may be up one pound.     Last echo 10/9/18 showed an EF 55-60%   In the ED the patient was hypertensive 154/59 Hr in the 60's and afebrile. Labs were significant for troponin was negative SCr 1.69 BUN 41 baseline around 1.3-1.4, Nt pro BNP is elevated at 2473. She has WBC of 12.4 hgb at baseline of 12.  The patient was treated with 40mg Iv lasix and sent to the floor.     She denies any coughing fever or sick contacts. ROS otherwise negative    PTA cardiac meds  apixaban 2.5mg BID  Coreg 3.125mg BID  Spironolactone 12.5mg daily  Lasix 40mg in the am and 20mg in the pm      Review of Symptoms:     Comprehensive 10 point review of systems was negative unless otherwise noted in the HPI.     Past Medical History:     Past Medical History:   Diagnosis Date     Adjustment disorder with anxious mood 2015     Advanced directives,  counseling/discussion 8/30/2012    Patient states has Advance Directive and will bring in a copy to clinic. 8/30/2012   Tevin [facility] Medical Assistant \       Anemia 9/25/2015     Basal cell cancer      CHF (congestive heart failure) (H) 9/18/2014     CKD (chronic kidney disease) stage 3, GFR 30-59 ml/min (H) 9/29/2015     DDD (degenerative disc disease), lumbar 9/25/2015     Diffuse idiopathic pulmonary fibrosis (H) 5/6/2013     Encounter for palliative care 5/18/2015     History of blood transfusion 9/29/2015     Hypertension goal BP (blood pressure) < 140/90 9/7/2012     Hypothyroid 9/7/2012     Irritable bowel syndrome 10/29/2013     Macular degeneration      Macular degeneration, 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     Nondisplaced spiral fracture of shaft of humerus      Osteoporosis 8/13/2013     Imo Update utility     RA (rheumatoid arthritis) (H) 5/7/2013     Rheumatic fever      Shingles      Spinal stenosis of lumbar region with neurogenic claudication 9/14/2015       Past Surgical History:   Procedure Laterality Date     ANESTHESIA CARDIOVERSION N/A 9/14/2018    Procedure: ANESTHESIA CARDIOVERSION;;  Surgeon: GENERIC ANESTHESIA PROVIDER;  Location: [facility] OR     ANESTHESIA CARDIOVERSION N/A 10/11/2018    Procedure: ANESTHESIA CARDIOVERSION;  Cardioversion;  Surgeon: GENERIC ANESTHESIA PROVIDER;  Location: [facility] OR     ANESTHESIA CARDIOVERSION N/A 10/16/2018    Procedure: Anesthesia Cardioversion ;  Surgeon: GENERIC ANESTHESIA PROVIDER;  Location: [facility] OR     APPENDECTOMY       BIOPSY      hemorrhoidectomy     ENT SURGERY      tonsillectomy     GYN SURGERY      3 D & C's     HYSTERECTOMY, PAP NO LONGER INDICATED       LAMINECTOMY LUMBAR ONE LEVEL N/A 10/13/2015    Procedure: LAMINECTOMY LUMBAR ONE LEVEL;  Surgeon: Fransico Toussaint MD;  Location: [facility] OR        Allergies:     Allergies   Allergen Reactions     Cephalexin Hcl Diarrhea     Gabapentin Other (See Comments)     Dizzsiness     Naproxen GI Disturbance      Perfume      Macrobid [Nitrofurantoin Anhydrous]      Possibly related to lung disease      Seasonal Allergies      Sulfa Drugs      Throat swelling     Xanax [Alprazolam] Other (See Comments)     Dizziness      Ciprofloxacin Itching and Rash        Outpatient Medications:       No current facility-administered medications on file prior to encounter.   Current Outpatient Prescriptions on File Prior to Encounter:  Abatacept (ORENCIA IV) Inject into the vein every 28 days   ACETAMINOPHEN PO Take 1,000 mg by mouth 2 times daily as needed    albuterol (2.5 MG/3ML) 0.083% neb solution Take 1 vial by nebulization every 6 hours as needed for shortness of breath / dyspnea or wheezing   apixaban ANTICOAGULANT (ELIQUIS) 2.5 MG tablet Take 1 tablet (2.5 mg) by mouth 2 times daily   Artificial Tear Ointment (REFRESH P.M.) OINT Apply  to eye. Daily at bedtime    Ascorbic Acid (VITAMIN C PO) Take 500 mg by mouth daily    azaTHIOprine (IMURAN) 50 MG tablet Take 1 tablet (50 mg) by mouth daily   Blood Pressure Monitor KIT 1 kit daily as needed   calcium-vitamin D (CALTRATE) 600-400 MG-UNIT per tablet Take 1 tablet by mouth 2 times daily    carvedilol (COREG) 3.125 MG tablet Take 1 tablet (3.125 mg) by mouth 2 times daily (with meals)   cetirizine (ZYRTEC ALLERGY) 10 MG tablet Take 10 mg by mouth At Bedtime   COMPOUNDED NON-CONTROLLED SUBSTANCE (CMPD RX) - PHARMACY TO MIX COMPOUNDED MEDICATION Estriol 1mg/gram. Place 1 gram vaginally daily for two weeks, then vaginally twice weekly after.   fish oil-omega-3 fatty acids (FISH OIL) 1000 MG capsule Take 2 g by mouth daily. 2 capsules daily    folic acid (FOLVITE) 1 MG tablet Take 1 tablet (1 mg) by mouth daily   furosemide (LASIX) 20 MG tablet Take 40mg in the AM and 20mg in the PM   hydrocortisone 2.5 % cream Apply BID to affected region(s) for 7-10 days.   Hypromellose (GENTEAL MILD OP) Apply  to eye daily.   levothyroxine (SYNTHROID/LEVOTHROID) 75 MCG tablet TAKE ONE TABLET BY MOUTH  "ONCE DAILY   Multiple Vitamins-Minerals (PRESERVISION AREDS PO) Take 1 tablet by mouth daily    nitroglycerin (NITROSTAT) 0.4 MG SL tablet Place 1 tablet (0.4 mg) under the tongue every 5 minutes as needed for chest pain   order for DME Dispense SP Nam-alberta   order for DME Equipment being ordered: Dispense baffle, for use with nebulizer.   order for DME Equipment being ordered: Nebulizer. Use with Albuterol.   order for DME Equipment being ordered: Dispense face mask.Mrs. Spicer is immunosuppressed due to rheumatoid arthritis.   ranibizumab (LUCENTIS) 0.3 MG/0.05ML SOLN 0.3 mg by Intravitreal route Every 6 weeks   ranitidine (ZANTAC) 150 MG tablet Take 1 tablet (150 mg) by mouth 2 times daily   saccharomyces boulardii (FLORASTOR) 250 MG capsule Take 250 mg by mouth daily   senna-docusate (SENOKOT-S;PERICOLACE) 8.6-50 MG per tablet Take 2 tablets by mouth At Bedtime Hold if diarrhea occurs.   spironolactone (ALDACTONE) 25 MG tablet Take 0.5 tablets (12.5 mg) by mouth daily   trimethoprim (TRIMPEX) 100 MG tablet Take 0.5 tablets (50 mg) by mouth daily        Family History:     Family History   Problem Relation Age of Onset     Hypertension Mother      Psychotic Disorder Father      Diabetes Son      Diabetes Daughter      Blood Disease Daughter         Social History:     Social History   Substance Use Topics     Smoking status: Never Smoker     Smokeless tobacco: Never Used     Alcohol use Yes      Comment: rare wine           Physical Exam:   Blood pressure 144/89, pulse 65, temperature 98.1  F (36.7  C), temperature source Oral, resp. rate 18, height 1.6 m (5' 3\"), weight 70.8 kg (156 lb), SpO2 99 %, not currently breastfeeding.  Temp (24hrs), Av.1  F (36.7  C), Min:98.1  F (36.7  C), Max:98.1  F (36.7  C)      Gen: no acute distress  HEENT: no scleral icterus, pupils equal and reactive to light, moist mucous membranes, no nasal discharge.  NECK: JVP 14  CARDIOVASCULAR: normal rate, S1/S2 normal, no " "murmurs, rubs or gallops   RESPIRATORY: bilateral coarse crackles   ABDOMEN: mildly distended non tender to palpation  EXTREMITIES: peripheral pulses normal, no peripheral edema, warm, capillary refill < 2 seconds  Skin: no ecchymoses, no rashes  NEURO: grossly intact  PSYCH: affect appropriate      Data:   CMP  Recent Labs  Lab 11/28/18  1558      POTASSIUM 3.8   CHLORIDE 104   CO2 26   ANIONGAP 7   *   BUN 41*   CR 1.69*   GFRESTIMATED 29*   GFRESTBLACK 35*   FATOUMATA 7.8*   PROTTOTAL 7.5   ALBUMIN 3.5   BILITOTAL 0.4   ALKPHOS 65   AST 21   ALT 21     CBC  Recent Labs  Lab 11/28/18  1558   WBC 6.2   RBC 3.74*   HGB 12.4   HCT 38.3   *   MCH 33.2*   MCHC 32.4   RDW 13.6        INRNo lab results found in last 7 days.  Arterial Blood GasNo lab results found in last 7 days.     EKG:    ECG shows baseline a flutter with intermittently paced rythm         Remote device check 11/26/2018: \"Patient has a Medtronic dual lead pacemaker.  Normal pacemaker function.  4 AT/AF episodes recorded since 11/17/18 - < 1 min - > 6 days in duration.  AF burden = 74.3%.  Patient is taking Eliquis.  No VHR episodes recorded.  Presenting EGM = AF with  @ 62 bpm.  AP = 24.5%.   = 63%.  Estimated battery longevity to ZENAIDA = 7 years.\"      Imaging:    CXR Mildly increased bibasilar opacities which may represent  atelectasis versus infection versus progression of interstitial lung  disease.    Echo 10/9/18    Global and regional left ventricular function is normal with an EF of 55-60%.  The right ventricle is not well visualized.  Mild to moderate tricuspid insufficiency is present.  There is mild pulmonary hypertension.  The inferior vena cava was normal in size.  This study was compared with the study from 8/28/14 .  There has been no change.    Echo 10/16/2018  The Ejection Fraction is estimated at 55-60%.  Global right ventricular function is normal.  Moderate to severe mitral insufficiency is present.  Moderate " tricuspid insufficiency is present.  Multiple MR jets noted. There is blunted systolic forward flow in 2 of the  pulmonary veins. MR volume is estimated at 34ml, however this likely  underestimates MR due to multiple jets.     Compared to prior TTE on 10/9/2018, MR appears to be worse.    PFT  -2/14/2018 mild restriction and mild diffusion defect       Assessment/Plan:   Linda Spicer is an 88 yo F with a  PMH of HFpEF, PAF/Afluter (CHADDSVASC4 on eliquis) s/p multiple cardioversions currently on amiodarone, tachy lisa s/p PPM 2/17 rheumatoid pulmonary fibrosis, CKD, hypothyroidism,  who presents with fatigue and shortness of breath.     HFpEF  - ADHF -> she presents with increased BNP and shortness of breath worsening for the past week  - she has been in flutter frequently and presents in flutter she does go in and out of it per last pacer check, rates are controlled though she may be very symptomatic depending on her atrial kick. She has been seen by EP and was tried on amiodarone which was discontinue given she keeps going in and out of AF vs progression of MR to severe per last YOLANDA 10/16   - not a candidate for Ablation given co morbidities per EP notes  - consider repeat DCCV  - on spironolactone 12.5 mg daily       AF  - on apixaban for anticoagulation  - with very frequent cardioversions 9/14/18,10/11/18, 10/16/18  - rate controlled   - CHADSVASC4 on eliquis   - per device check has had 4 episodes since 11/17 1min->6 days   - given use of amiodarone and parenchymal involvement of rheumatic disease will order PFT's to ensure etiology of shortness of breath is not worsening lung function    Severe MR  - could benefit from afterload reduction and better bp control  - will use hydral/isordil given NEIL and need for diuresis    chronic  Rheumatoid disease with pulmonary involvement on azathioprine   Hypothyroidism- continue thyroxine      Nutrition:heart healthy diet  DVT ppx:on apixaban  Stress Ulcer  ppx:ranitidine    Case to be staffed in the am    Lisa Thompson   Cardiology fellow, PGY-5    30-Mar-2024

## 2024-06-17 PROBLEM — Z76.89 HEALTH CARE HOME: Status: RESOLVED | Noted: 2017-01-13 | Resolved: 2024-06-17

## 2024-06-17 NOTE — PLAN OF CARE
Focus:  Admission  Diagnosis: heart failure  Admitted from: [facility]  Via: wheel chair  Accompanied by: family.  Belongings: Placed in closet; valuables sent home with family.   Admission paperwork: complete.   Teaching: Call don't fall, use of console, meal times, visiting hours, orientation to unit, when to call for the RN (angina/sob/dizzyness, etc.), and stressed the importance of safety.   Access: PIV   Telemetry: Placed on pt   Ht./Wt.: complete    
Problem: Cardiac: Heart Failure (Adult)  Goal: Signs and Symptoms of Listed Potential Problems Will be Absent, Minimized or Managed (Cardiac: Heart Failure)  Signs and symptoms of listed potential problems will be absent, minimized or managed by discharge/transition of care (reference Cardiac: Heart Failure (Adult) CPG).     D. Pt admitted 10/8 from clinic due to presents from clinic after presenting with dizziness and palpitations coupled with 10lb weight gain. Pt is stable. On RA. Mild RUSSELL.  Paced on tele. Pain free. Appetie is good. +BM.  Pt made 200 ml UO.  Cr. slightely elevated (1.69/team aware of this). Bladder scanner showed onlyl 35cc urine in bladder. Dr. Jo notified/no new orders.   I. Keeping a strict I and O's.   A. Pt is stable.   P. NPO after midnight for possible AF CV in am.       
Problem: Patient Care Overview  Goal: Plan of Care/Patient Progress Review   10/08/18 1506   OTHER   Plan Of Care Reviewed With patient;daughter     /87  Temp 97.7  F (36.5  C) (Oral)  Resp 16  LMP  (LMP Unknown)    [place] [facility] [place] [facility] [place] and recent weight gain. A&Ox4 ex, pt reports dizziness with movement. Pt moves with SBA/A1. LS clear, no SOB reported. GI/ wnl per pt report. Continue with POC.       
Problem: Patient Care Overview  Goal: Plan of Care/Patient Progress Review  Discharge Planner PT   Patient plan for discharge: Home with home PT   Current status: Pt demonstrating IND with mobility in the room, SBA with ambulation in magallanes with use of 2WW.  Pt educated on continuing to use walker at home for safety.  Pt limited with ambulation due to fatigue, needed rest breaks intermittently.  Also completed exercises for LE strengthening.   Barriers to return to prior living situation: Decreased activity tolerance and balance  Recommendations for discharge: Home with home PT  Rationale for recommendations: When medically ready       Entered by: Daphne Irwin 10/12/2018 9:17 AM           
Problem: Patient Care Overview  Goal: Plan of Care/Patient Progress Review  Discharge Planner PT   Patient plan for discharge: home with A as needed from family  Current status: Ambulates 35ft x2 with FWW and CGA]x1.  Requires sitting rest break x5 min following each rep 2/2 SOB and fatigue.  Pt states that SOB is primary limitation on mobility even before being admitted. VSS on RA. Supine to sit with CGAx1.  Sit at EOB IND.  STS with FWW and CGAx1.  Is SOB with all activity to point of difficulty speaking.  Sats remain stable despite SOB.  .    Barriers to return to prior living situation: SOB, decreased gait tolerance, lives alone  Recommendations for discharge: home with HHPT vs TCU pending resolution of SOB and mobility status.  Rationale for recommendations: If mobility status improves following resolution of SOB, pt safe to discharge home with A from family as needed.        Entered by: Lon Ochoa 10/09/2018 4:10 PM             
Problem: Patient Care Overview  Goal: Plan of Care/Patient Progress Review  Discharge Planner PT   Patient plan for discharge: home with home PT  Current status: SBA for transfers. Ambulated 120 ft + 80 ft with FWW, CGA. Additional 40 ft with HHA, mild unsteadiness. Continue to recommend ambulation with FWW 2/2 fall risk.   Barriers to return to prior living situation: decreased endurance, impaired balance  Recommendations for discharge: home with us of an AD for ambulation, home PT for home safety eval   Rationale for recommendations: below baseline independence with ambulation, decreased activity tolerance.        Entered by: Asia Parra 10/11/2018 2:44 PM           
Problem: Patient Care Overview  Goal: Plan of Care/Patient Progress Review  Discharge Planner PT 6C  Patient plan for discharge: return home  Current status: Pt completes sit <> stand transfer with SBA up to 4WW: ambulates 2 x ~45' with SBA; slowed gait speed however pt reports at baseline speed. Prolonged seated rest break required 2/2 fatigue. Reports below baseline activity tolerance.  Barriers to return to prior living situation: activity tolerance, fatigue  Recommendations for discharge: return to home; HHPT  Rationale for recommendations: pt mobilizing well, has scooter and other assistive equipment available. Will benefit from continued therapy to improve functional activity tolerance and safety with independent mobility.        Entered by: Fredrick Calderon 10/10/2018 3:02 PM         
Problem: Patient Care Overview  Goal: Plan of Care/Patient Progress Review  Outcome: Improving  Pt admitted with dizziness and palpitations with 10 lb weight gain.  100% VPACE s/p successful DCCV yesterday.  Weight still trending upwards.  VS'S on RA and denied pain.  Burning and urgency with voiding, awaiting urine culture; UA positive for blood and bacteria.  1.8 L FR.  Otherwise, pt slept well and up SBA with walker.  Possible discharge today.  Continue to monitor and with POC.      
Problem: Patient Care Overview  Goal: Plan of Care/Patient Progress Review  Outcome: Improving  Pt admitted with dizziness and palpitations with 10 lb weight gain.  100% VPACE with underline Aflutter and DCCV likely today.  VS'S on RA and denied pain.  B/l LL crackles with 2+ LE edema.  1.8 L FR.  Otherwise, pt slept well and up SBA with walker.  Continue to monitor and with POC.          
Problem: Patient Care Overview  Goal: Plan of Care/Patient Progress Review  Outcome: No Change  Pt A/O. See flowsheets for VS. Paced with possible underlying Afib? RA. Denies pain. IV push Lasix. K+(3.9) + K+(3.8) replaced per protocol. Mg (1.5) replaced per protocol. Pt requested to kitchen to be able to order dairy. Pt endorsing lactose bothers pt and limits milk, but most other dairy products are okay. This allergy was deleted/updated so pt can order dairy from kitchen. Echo completed at bedside this afternoon. Family supportive at bedside for part of shift. Worked with therapy. Continue with plan of care and report changes to treatment team.       
Problem: Patient Care Overview  Goal: Plan of Care/Patient Progress Review  Outcome: No Change  Pt admitted 10/8 with dizziness, palpitations, and 10lbs weight gain.  Pt is a/o. See flowsheets for vital signs.  Orthostatics completed, see flowsheets.  RA.  Denies pain.  Pt received one dose of 60mg IV Lasix per order.  Pt retained some urine per bladder scan - see flowsheet.  Cards 1 updated.  See flowsheets for I/Os.  Labs to be drawn at 1600.  Diet order changed.  Pt to be NPO at midnight for possible cardioversion tomorrow.  Up SBA/walker.      Pt is pleasant and makes needs known.  Continue with plan of care and report changes to treatment team.      
Problem: Patient Care Overview  Goal: Plan of Care/Patient Progress Review  Outcome: No Change  Pt admitted with dizziness and palpitations with 10 lb weight gain.  100% VPACE with underline Aflutter and DCCV likely today.  NPO since midnight.  Weight is up about a pound.  VS'S on RA and denied pain.  B/l LL crackles with 1+ LE edema.  1.8 L FR.  Otherwise, pt slept well and up SBA with walker.  Continue to monitor and with POC.      
Problem: Patient Care Overview  Goal: Plan of Care/Patient Progress Review  Outcome: No Change  Pt remains V Paced, VSS. Denies pain up ambulating to bathroom with walker.       
Problem: Patient Care Overview  Goal: Plan of Care/Patient Progress Review  Physical Therapy Discharge Summary    Reason for therapy discharge:    Discharged to home with home therapy.    Progress towards therapy goal(s). See goals on Care Plan in [facility] electronic health record for goal details.  Goals partially met.  Barriers to achieving goals:   discharge from facility.    Therapy recommendation(s):    Continued therapy is recommended.  Rationale/Recommendations:  home PT.        
[FreeTextEntry2] : LT ankle pain

## 2024-10-01 NOTE — TELEPHONE ENCOUNTER
"Daughter states Patient is not with Caller but has symptoms of \"urinary tract infection.'  States has a Consent to Communicate on file.  This RN reviewed FNA guidelines re Patient not present to triage symptoms.  Currently Caller is planning to take her mother to Urgent Care.    Protocol- Information Call- Adult    Care advice reviewed.   Disposition- Information given   Caller states understanding of the recommended disposition.    is on her way to [facility].  Advised to call back if further questions or concerns.     TOMMY Parr RN  [facility]     Additional Information    [1] Caller is not with the adult (patient) AND [2] probable NON-URGENT symptoms    Protocols used: INFORMATION ONLY CALL-ADULT-    " Triage Call    Chief complaint: afib    Description: Pt reports he had ablation last year and was doing fine until today he noticed slight tightness in the chest after his morning walk and he checked his cardiac monitor at home, which showed he is in afib. He checked again later in the day and still shows afib. HR in 70s. Reports BP is stable. He denies symptoms of chest pain, palps, sob or dizziness. He stopped eliquis in June this year.     Next appt: 2/19/25 with Dr. Traore    Plan: Advised pt will review with Dr. Traore and call him back with recommendation. Advised pt should come in for a EKG to confirm rhythm. Advised pt if symptoms worse, he should seek care in ED. Pt v/u and agreeable to plan.

## 2025-01-09 NOTE — PATIENT INSTRUCTIONS
Take your medicines every day, as directed    Changes made today:  o Increase lasix to 40 mg twice daily.   o    Monitor Your Weight and Symptoms    Contact us if you:      Gain 2 pounds in one day or 5 pounds in one week    Feel more short of breath    Notice more leg swelling    Feel lightheadeded   Change your lifestyle    Limit Salt or Sodium:    2000 mg  Limit Fluids:    2000 mL or approximately 64 ounces  Eat a Heart Healthy Diet    Low in saturated fats  Stay Active:    Aim to move at least 150 minutes every  week         To Contact us    During Business Hours:  [phone], option # 1 (University)  Then option # 4 (medical questions)     After hours, weekends or holidays:   [phone], Option #4  Ask to speak to the On-Call Cardiologist. Inform them you are a CORE/heart failure patient at the [facility].     Use [employer]ES[employer]OD[employer] allows you to communicate directly with your heart team through secure messaging.    [facility] can be accessed any time on your phone, computer, or tablet.    If you need assistance, we'd be happy to help!         Keep your Heart Appointments:    1.  Echocardiogram next week  2.  Repeat BMP in one week.   3.  I will get back to you regarding your pacemaker.  4.  Follow up appointment palliative care         99

## 2025-05-09 NOTE — NURSING NOTE
Chief Complaint   Patient presents with     New Patient     AF ablation consult     Medications reviewed and vitals performed.   Vita Hurtado CMA   
Bere Craig(Resident)

## (undated) DEVICE — VESSEL DILATOR (JCD24.0-38-20)

## (undated) DEVICE — PACK HEART LEFT CUSTOM

## (undated) DEVICE — TUBING PRESSURE 30"

## (undated) DEVICE — Device

## (undated) DEVICE — GUIDEWIRE VASC SAFARI2 0.035X275CM H74939406XS1

## (undated) DEVICE — CATH ANGIO INFINITI PIGTAIL 4FRX110CM 8 SH 538450E

## (undated) DEVICE — GUIDEWIRE PROTRACK PGTL .025IN DIA 23

## (undated) DEVICE — WIRE GUIDE 0.025"X260CM AMPLATZ XSTIFF THSF-25-260-AES

## (undated) DEVICE — INTRO SHEATH 6FRX25CM PINNACLE RSS606

## (undated) DEVICE — KIT HAND CONTROL ACIST 014644 AR-P54

## (undated) DEVICE — DILATOR VASCULAR 12FRX20CM G00995

## (undated) DEVICE — MITRACLIP STEERABLE GUIDE CATH

## (undated) DEVICE — CATH ANGIO MULTIPURPOSE AL1 SIDEHOLES 6FRX100CM 533640

## (undated) DEVICE — INTRO SHEATH AVANTI 4FRX23CM 504604T

## (undated) DEVICE — DILATOR VASCULAR 16FRX20CM G01212

## (undated) DEVICE — SYR ANGIOGRAPHY MULTIUSE KIT ACIST 014612

## (undated) DEVICE — CATH ANGIO INFINITI 3DRC 4FRX100CM 538476

## (undated) DEVICE — INTRO SHEATH 7FRX25CM PINNACLE RSS706

## (undated) DEVICE — DILATOR VASCULAR 20FRX20CM G01471

## (undated) DEVICE — KIT RIGHT HEART CATH H965601307191

## (undated) DEVICE — CATH ANGIO INFINITI JL3.5 4FRX100CM 538418

## (undated) DEVICE — NDL TRANSSEPTAL BRK XS 18GA 71CM BRK-1 CVD G407209

## (undated) DEVICE — CLOSURE DEVICE 6FR VASC PROGLIDE MEDICATED SUTURE 12673-03

## (undated) RX ORDER — FENTANYL CITRATE 50 UG/ML
INJECTION, SOLUTION INTRAMUSCULAR; INTRAVENOUS
Status: DISPENSED
Start: 2019-01-31

## (undated) RX ORDER — DEXAMETHASONE SODIUM PHOSPHATE 4 MG/ML
INJECTION, SOLUTION INTRA-ARTICULAR; INTRALESIONAL; INTRAMUSCULAR; INTRAVENOUS; SOFT TISSUE
Status: DISPENSED
Start: 2019-02-11

## (undated) RX ORDER — LIDOCAINE HYDROCHLORIDE 20 MG/ML
INJECTION, SOLUTION EPIDURAL; INFILTRATION; INTRACAUDAL; PERINEURAL
Status: DISPENSED
Start: 2019-02-11

## (undated) RX ORDER — FENTANYL CITRATE 50 UG/ML
INJECTION, SOLUTION INTRAMUSCULAR; INTRAVENOUS
Status: DISPENSED
Start: 2018-10-16

## (undated) RX ORDER — HEPARIN SODIUM 1000 [USP'U]/ML
INJECTION, SOLUTION INTRAVENOUS; SUBCUTANEOUS
Status: DISPENSED
Start: 2019-02-11

## (undated) RX ORDER — PROPOFOL 10 MG/ML
INJECTION, EMULSION INTRAVENOUS
Status: DISPENSED
Start: 2019-02-11

## (undated) RX ORDER — ONDANSETRON 2 MG/ML
INJECTION INTRAMUSCULAR; INTRAVENOUS
Status: DISPENSED
Start: 2019-02-11

## (undated) RX ORDER — ASPIRIN 325 MG
TABLET ORAL
Status: DISPENSED
Start: 2019-01-31

## (undated) RX ORDER — SODIUM CHLORIDE 9 MG/ML
INJECTION, SOLUTION INTRAVENOUS
Status: DISPENSED
Start: 2019-01-31

## (undated) RX ORDER — HEPARIN SODIUM 1000 [USP'U]/ML
INJECTION, SOLUTION INTRAVENOUS; SUBCUTANEOUS
Status: DISPENSED
Start: 2019-01-31

## (undated) RX ORDER — NITROGLYCERIN 5 MG/ML
VIAL (ML) INTRAVENOUS
Status: DISPENSED
Start: 2019-01-31

## (undated) RX ORDER — FENTANYL CITRATE 50 UG/ML
INJECTION, SOLUTION INTRAMUSCULAR; INTRAVENOUS
Status: DISPENSED
Start: 2019-02-11

## (undated) RX ORDER — ASPIRIN 325 MG
TABLET ORAL
Status: DISPENSED
Start: 2019-02-11

## (undated) RX ORDER — CLINDAMYCIN PHOSPHATE 900 MG/50ML
INJECTION, SOLUTION INTRAVENOUS
Status: DISPENSED
Start: 2019-02-11